# Patient Record
Sex: MALE | Race: WHITE | NOT HISPANIC OR LATINO | Employment: UNEMPLOYED | URBAN - METROPOLITAN AREA
[De-identification: names, ages, dates, MRNs, and addresses within clinical notes are randomized per-mention and may not be internally consistent; named-entity substitution may affect disease eponyms.]

---

## 2017-01-05 ENCOUNTER — OFFICE VISIT - HEALTHEAST (OUTPATIENT)
Dept: FAMILY MEDICINE | Facility: CLINIC | Age: 37
End: 2017-01-05

## 2017-01-05 DIAGNOSIS — S39.012A LOW BACK STRAIN, INITIAL ENCOUNTER: ICD-10-CM

## 2017-01-05 DIAGNOSIS — S80.01XA CONTUSION OF RIGHT KNEE, INITIAL ENCOUNTER: ICD-10-CM

## 2020-05-17 ENCOUNTER — APPOINTMENT (OUTPATIENT)
Dept: ULTRASOUND IMAGING | Facility: CLINIC | Age: 40
End: 2020-05-17
Attending: INTERNAL MEDICINE
Payer: MEDICAID

## 2020-05-17 ENCOUNTER — ALLIED HEALTH/NURSE VISIT (OUTPATIENT)
Dept: NEUROLOGY | Facility: CLINIC | Age: 40
End: 2020-05-17
Attending: PSYCHIATRY & NEUROLOGY
Payer: MEDICAID

## 2020-05-17 ENCOUNTER — APPOINTMENT (OUTPATIENT)
Dept: CT IMAGING | Facility: CLINIC | Age: 40
End: 2020-05-17
Attending: INTERNAL MEDICINE
Payer: MEDICAID

## 2020-05-17 ENCOUNTER — HOSPITAL ENCOUNTER (INPATIENT)
Facility: CLINIC | Age: 40
LOS: 39 days | Discharge: NURSING, REHAB OR LONG-TERM CARE | End: 2020-06-25
Admitting: INTERNAL MEDICINE
Payer: MEDICAID

## 2020-05-17 ENCOUNTER — HOSPITAL ENCOUNTER (EMERGENCY)
Facility: CLINIC | Age: 40
Discharge: HOME | End: 2020-05-17
Attending: EMERGENCY MEDICINE | Admitting: EMERGENCY MEDICINE
Payer: OTHER GOVERNMENT

## 2020-05-17 ENCOUNTER — APPOINTMENT (OUTPATIENT)
Dept: GENERAL RADIOLOGY | Facility: CLINIC | Age: 40
End: 2020-05-17
Attending: INTERNAL MEDICINE
Payer: MEDICAID

## 2020-05-17 DIAGNOSIS — R57.0 CARDIOGENIC SHOCK (H): ICD-10-CM

## 2020-05-17 DIAGNOSIS — R29.3 POSTURING EPISODE: ICD-10-CM

## 2020-05-17 DIAGNOSIS — Z98.890 S/P TRACHEOPLASTY: ICD-10-CM

## 2020-05-17 DIAGNOSIS — G93.1 HYPOXIC BRAIN INJURY (H): ICD-10-CM

## 2020-05-17 DIAGNOSIS — I46.9 CARDIAC ARREST (H): Primary | ICD-10-CM

## 2020-05-17 DIAGNOSIS — R11.0 NAUSEA: ICD-10-CM

## 2020-05-17 DIAGNOSIS — I25.83 CORONARY ARTERY DISEASE DUE TO LIPID RICH PLAQUE: ICD-10-CM

## 2020-05-17 DIAGNOSIS — I46.9 CARDIAC ARREST (H): ICD-10-CM

## 2020-05-17 DIAGNOSIS — Z91.89 AT HIGH RISK FOR MALNUTRITION: ICD-10-CM

## 2020-05-17 DIAGNOSIS — I50.21 ACUTE SYSTOLIC HEART FAILURE (H): ICD-10-CM

## 2020-05-17 DIAGNOSIS — J96.21 ACUTE AND CHRONIC RESPIRATORY FAILURE WITH HYPOXIA (H): ICD-10-CM

## 2020-05-17 DIAGNOSIS — L30.9 DERMATITIS: ICD-10-CM

## 2020-05-17 DIAGNOSIS — R19.7 DIARRHEA DUE TO MALABSORPTION: ICD-10-CM

## 2020-05-17 DIAGNOSIS — K29.71 GASTROINTESTINAL HEMORRHAGE ASSOCIATED WITH GASTRITIS, UNSPECIFIED GASTRITIS TYPE: ICD-10-CM

## 2020-05-17 DIAGNOSIS — I25.10 CORONARY ARTERY DISEASE DUE TO LIPID RICH PLAQUE: ICD-10-CM

## 2020-05-17 DIAGNOSIS — I21.3 ST ELEVATION MI (STEMI) (H): ICD-10-CM

## 2020-05-17 DIAGNOSIS — K90.9 DIARRHEA DUE TO MALABSORPTION: ICD-10-CM

## 2020-05-17 LAB
ABO + RH BLD: NORMAL
ALBUMIN SERPL-MCNC: 3.2 G/DL (ref 3.4–5)
ALBUMIN SERPL-MCNC: 3.3 G/DL (ref 3.4–5)
ALBUMIN SERPL-MCNC: 3.8 G/DL (ref 3.4–5)
ALBUMIN UR-MCNC: 10 MG/DL
ALP SERPL-CCNC: 60 U/L (ref 40–150)
ALP SERPL-CCNC: 84 U/L (ref 40–150)
ALP SERPL-CCNC: 86 U/L (ref 40–150)
ALT SERPL W P-5'-P-CCNC: 919 U/L (ref 0–70)
ALT SERPL W P-5'-P-CCNC: 972 U/L (ref 0–70)
ALT SERPL W P-5'-P-CCNC: 978 U/L (ref 0–70)
AMPHETAMINES UR QL SCN: NEGATIVE
ANION GAP SERPL CALCULATED.3IONS-SCNC: 13 MMOL/L (ref 3–14)
ANION GAP SERPL CALCULATED.3IONS-SCNC: 18 MMOL/L (ref 3–14)
ANION GAP SERPL CALCULATED.3IONS-SCNC: 18 MMOL/L (ref 3–14)
APPEARANCE UR: CLEAR
APTT PPP: 170 SEC (ref 22–37)
APTT PPP: 51 SEC (ref 22–37)
AST SERPL W P-5'-P-CCNC: 1138 U/L (ref 0–45)
AST SERPL W P-5'-P-CCNC: 1834 U/L (ref 0–45)
AST SERPL W P-5'-P-CCNC: 567 U/L (ref 0–45)
B-HCG FREE SERPL-ACNC: 2.4 [IU]/L (ref 0.86–1.14)
BACTERIA #/AREA URNS HPF: ABNORMAL /HPF
BARBITURATES UR QL: NEGATIVE
BASE DEFICIT BLDA-SCNC: 12.6 MMOL/L
BASE DEFICIT BLDA-SCNC: 6 MMOL/L
BASE DEFICIT BLDA-SCNC: 6.9 MMOL/L
BASE DEFICIT BLDA-SCNC: 7.3 MMOL/L
BASE DEFICIT BLDA-SCNC: 7.3 MMOL/L
BASE DEFICIT BLDA-SCNC: 8.2 MMOL/L
BASE DEFICIT BLDA-SCNC: 9.1 MMOL/L
BASE DEFICIT BLDA-SCNC: 9.1 MMOL/L
BASE DEFICIT BLDV-SCNC: 7.2 MMOL/L
BASE DEFICIT BLDV-SCNC: 7.3 MMOL/L
BASOPHILS # BLD AUTO: 0 10E9/L (ref 0–0.2)
BASOPHILS # BLD AUTO: 0.1 10E9/L (ref 0–0.2)
BASOPHILS NFR BLD AUTO: 0 %
BASOPHILS NFR BLD AUTO: 0.3 %
BENZODIAZ UR QL: POSITIVE
BILIRUB SERPL-MCNC: 0.1 MG/DL (ref 0.2–1.3)
BILIRUB SERPL-MCNC: 0.5 MG/DL (ref 0.2–1.3)
BILIRUB SERPL-MCNC: 0.7 MG/DL (ref 0.2–1.3)
BILIRUB UR QL STRIP: NEGATIVE
BLD GP AB SCN SERPL QL: NORMAL
BLD GP AB SCN SERPL QL: NORMAL
BLD PROD TYP BPU: NORMAL
BLOOD BANK CMNT PATIENT-IMP: NORMAL
BLOOD BANK CMNT PATIENT-IMP: NORMAL
BUN SERPL-MCNC: 15 MG/DL (ref 7–30)
BUN SERPL-MCNC: 18 MG/DL (ref 7–30)
BUN SERPL-MCNC: 22 MG/DL (ref 7–30)
CA-I BLD-MCNC: 3.6 MG/DL (ref 4.4–5.2)
CA-I BLD-MCNC: 3.7 MG/DL (ref 4.4–5.2)
CA-I BLD-MCNC: 4.4 MG/DL (ref 4.4–5.2)
CA-I BLD-SCNC: 4.7 MG/DL (ref 4.4–5.2)
CALCIUM SERPL-MCNC: 6.6 MG/DL (ref 8.5–10.1)
CALCIUM SERPL-MCNC: 7.4 MG/DL (ref 8.5–10.1)
CALCIUM SERPL-MCNC: 8.2 MG/DL (ref 8.5–10.1)
CANNABINOIDS UR QL SCN: NEGATIVE
CHLORIDE SERPL-SCNC: 101 MMOL/L (ref 94–109)
CHLORIDE SERPL-SCNC: 107 MMOL/L (ref 94–109)
CHLORIDE SERPL-SCNC: 115 MMOL/L (ref 94–109)
CO2 BLDCOV-SCNC: 16 MMOL/L (ref 21–28)
CO2 SERPL-SCNC: 15 MMOL/L (ref 20–32)
CO2 SERPL-SCNC: 16 MMOL/L (ref 20–32)
CO2 SERPL-SCNC: 17 MMOL/L (ref 20–32)
COCAINE UR QL: NEGATIVE
COLOR UR AUTO: ABNORMAL
CORTIS SERPL-MCNC: 47.6 UG/DL (ref 4–22)
CREAT SERPL-MCNC: 1.21 MG/DL (ref 0.66–1.25)
CREAT SERPL-MCNC: 1.24 MG/DL (ref 0.66–1.25)
CREAT SERPL-MCNC: 1.39 MG/DL (ref 0.66–1.25)
CRP SERPL-MCNC: <2.9 MG/L (ref 0–8)
D DIMER PPP FEU-MCNC: >20 UG/ML FEU (ref 0–0.5)
DIFFERENTIAL METHOD BLD: ABNORMAL
DIFFERENTIAL METHOD BLD: ABNORMAL
EOSINOPHIL # BLD AUTO: 0.1 10E9/L (ref 0–0.7)
EOSINOPHIL # BLD AUTO: 0.1 10E9/L (ref 0–0.7)
EOSINOPHIL NFR BLD AUTO: 0.4 %
EOSINOPHIL NFR BLD AUTO: 0.8 %
ERYTHROCYTE [DISTWIDTH] IN BLOOD BY AUTOMATED COUNT: 12 % (ref 10–15)
ERYTHROCYTE [DISTWIDTH] IN BLOOD BY AUTOMATED COUNT: 12.5 % (ref 10–15)
ERYTHROCYTE [DISTWIDTH] IN BLOOD BY AUTOMATED COUNT: 12.5 % (ref 10–15)
ERYTHROCYTE [SEDIMENTATION RATE] IN BLOOD BY WESTERGREN METHOD: 7 MM/H (ref 0–15)
ETHANOL UR QL SCN: NEGATIVE
FIBRINOGEN PPP-MCNC: 212 MG/DL (ref 200–420)
GFR SERPL CREATININE-BSD FRML MDRD: 41 ML/MIN/{1.73_M2}
GFR SERPL CREATININE-BSD FRML MDRD: 63 ML/MIN/{1.73_M2}
GFR SERPL CREATININE-BSD FRML MDRD: 74 ML/MIN/{1.73_M2}
GLUCOSE BLD-MCNC: 164 MG/DL (ref 70–99)
GLUCOSE BLD-MCNC: 276 MG/DL (ref 70–99)
GLUCOSE BLD-MCNC: 366 MG/DL (ref 70–99)
GLUCOSE BLD-MCNC: 422 MG/DL (ref 70–99)
GLUCOSE BLDC GLUCOMTR-MCNC: 145 MG/DL (ref 70–99)
GLUCOSE BLDC GLUCOMTR-MCNC: 166 MG/DL (ref 70–99)
GLUCOSE BLDC GLUCOMTR-MCNC: 188 MG/DL (ref 70–99)
GLUCOSE BLDC GLUCOMTR-MCNC: 248 MG/DL (ref 70–99)
GLUCOSE BLDC GLUCOMTR-MCNC: 287 MG/DL (ref 70–99)
GLUCOSE SERPL-MCNC: 155 MG/DL (ref 70–99)
GLUCOSE SERPL-MCNC: 260 MG/DL (ref 70–99)
GLUCOSE SERPL-MCNC: 394 MG/DL (ref 70–99)
GLUCOSE UR STRIP-MCNC: 30 MG/DL
GRAM STN SPEC: NORMAL
GRAM STN SPEC: NORMAL
HCO3 BLD-SCNC: 14 MMOL/L (ref 21–28)
HCO3 BLD-SCNC: 16 MMOL/L (ref 21–28)
HCO3 BLD-SCNC: 16 MMOL/L (ref 21–28)
HCO3 BLD-SCNC: 17 MMOL/L (ref 21–28)
HCO3 BLD-SCNC: 17 MMOL/L (ref 21–28)
HCO3 BLD-SCNC: 18 MMOL/L (ref 21–28)
HCO3 BLDA-SCNC: 14 MMOL/L (ref 21–28)
HCO3 BLDA-SCNC: 17 MMOL/L (ref 21–28)
HCO3 BLDV-SCNC: 18 MMOL/L (ref 21–28)
HCO3 BLDV-SCNC: 18 MMOL/L (ref 21–28)
HCT VFR BLD AUTO: 35.2 % (ref 40–53)
HCT VFR BLD AUTO: 35.6 % (ref 40–53)
HCT VFR BLD AUTO: 50.6 % (ref 40–53)
HCT VFR BLD CALC: 48 %PCV (ref 40–53)
HGB BLD CALC-MCNC: 16.3 G/DL (ref 13.3–17.7)
HGB BLD-MCNC: 11.6 G/DL (ref 13.3–17.7)
HGB BLD-MCNC: 12 G/DL (ref 13.3–17.7)
HGB BLD-MCNC: 12.3 G/DL (ref 13.3–17.7)
HGB BLD-MCNC: 15.6 G/DL (ref 13.3–17.7)
HGB FREE PLAS-MCNC: 70 MG/DL
HGB UR QL STRIP: ABNORMAL
IMM GRANULOCYTES # BLD: 1.1 10E9/L (ref 0–0.4)
IMM GRANULOCYTES NFR BLD: 4.6 %
INR PPP: 1.25 (ref 0.86–1.14)
INR PPP: 1.43 (ref 0.86–1.14)
INR PPP: 1.59 (ref 0.86–1.14)
INTERPRETATION ECG - MUSE: NORMAL
KCT BLD-ACNC: 154 SEC (ref 75–150)
KCT BLD-ACNC: 304 SEC (ref 75–150)
KCT BLD-ACNC: 320 SEC (ref 75–150)
KETONES UR STRIP-MCNC: 10 MG/DL
LACTATE BLD-SCNC: 10 MMOL/L (ref 0.7–2)
LACTATE BLD-SCNC: 11.3 MMOL/L (ref 0.7–2)
LACTATE BLD-SCNC: 16 MMOL/L (ref 0.7–2)
LACTATE BLD-SCNC: 7.3 MMOL/L (ref 0.7–2)
LACTATE BLD-SCNC: 8.9 MMOL/L (ref 0.7–2)
LDH SERPL L TO P-CCNC: 1564 U/L (ref 85–227)
LEUKOCYTE ESTERASE UR QL STRIP: NEGATIVE
LMWH PPP CHRO-ACNC: 0.42 IU/ML
LMWH PPP CHRO-ACNC: <0.1 IU/ML
LYMPHOCYTES # BLD AUTO: 1.3 10E9/L (ref 0.8–5.3)
LYMPHOCYTES # BLD AUTO: 4.8 10E9/L (ref 0.8–5.3)
LYMPHOCYTES NFR BLD AUTO: 48 %
LYMPHOCYTES NFR BLD AUTO: 5.2 %
Lab: NORMAL
MAGNESIUM SERPL-MCNC: 1.9 MG/DL (ref 1.6–2.3)
MAGNESIUM SERPL-MCNC: 2 MG/DL (ref 1.6–2.3)
MCH RBC QN AUTO: 30.1 PG (ref 26.5–33)
MCH RBC QN AUTO: 30.4 PG (ref 26.5–33)
MCH RBC QN AUTO: 30.4 PG (ref 26.5–33)
MCHC RBC AUTO-ENTMCNC: 30.8 G/DL (ref 31.5–36.5)
MCHC RBC AUTO-ENTMCNC: 34.1 G/DL (ref 31.5–36.5)
MCHC RBC AUTO-ENTMCNC: 34.6 G/DL (ref 31.5–36.5)
MCV RBC AUTO: 87 FL (ref 78–100)
MCV RBC AUTO: 89 FL (ref 78–100)
MCV RBC AUTO: 99 FL (ref 78–100)
METAMYELOCYTES # BLD: 0.4 10E9/L
METAMYELOCYTES NFR BLD MANUAL: 4.2 %
MISCELLANEOUS TEST: NORMAL
MONOCYTES # BLD AUTO: 0.5 10E9/L (ref 0–1.3)
MONOCYTES # BLD AUTO: 0.6 10E9/L (ref 0–1.3)
MONOCYTES NFR BLD AUTO: 2.2 %
MONOCYTES NFR BLD AUTO: 5.9 %
MUCOUS THREADS #/AREA URNS LPF: PRESENT /LPF
MYELOCYTES # BLD: 0.1 10E9/L
MYELOCYTES NFR BLD MANUAL: 0.8 %
NEUTROPHILS # BLD AUTO: 20.9 10E9/L (ref 1.6–8.3)
NEUTROPHILS # BLD AUTO: 4 10E9/L (ref 1.6–8.3)
NEUTROPHILS NFR BLD AUTO: 39.5 %
NEUTROPHILS NFR BLD AUTO: 87.3 %
NITRATE UR QL: NEGATIVE
NRBC # BLD AUTO: 0 10*3/UL
NRBC # BLD AUTO: 0.1 10*3/UL
NRBC BLD AUTO-RTO: 0 /100
NRBC BLD AUTO-RTO: 1 /100
NUM BPU REQUESTED: 1
O2/TOTAL GAS SETTING VFR VENT: 100 %
O2/TOTAL GAS SETTING VFR VENT: 50 %
OPIATES UR QL SCN: NEGATIVE
OXYHGB MFR BLD: 88 % (ref 92–100)
OXYHGB MFR BLD: 89 % (ref 92–100)
OXYHGB MFR BLD: 93 % (ref 92–100)
OXYHGB MFR BLD: 95 % (ref 92–100)
OXYHGB MFR BLD: 96 % (ref 92–100)
OXYHGB MFR BLD: 98 % (ref 92–100)
OXYHGB MFR BLDA: 96 % (ref 75–100)
OXYHGB MFR BLDA: 98 % (ref 75–100)
OXYHGB MFR BLDV: 74 %
OXYHGB MFR BLDV: 84 %
PCO2 BLD: 27 MM HG (ref 35–45)
PCO2 BLD: 28 MM HG (ref 35–45)
PCO2 BLD: 31 MM HG (ref 35–45)
PCO2 BLD: 31 MM HG (ref 35–45)
PCO2 BLD: 34 MM HG (ref 35–45)
PCO2 BLD: 36 MM HG (ref 35–45)
PCO2 BLDA: 24 MM HG (ref 35–45)
PCO2 BLDA: 29 MM HG (ref 35–45)
PCO2 BLDV: 100 MM HG (ref 40–50)
PCO2 BLDV: 33 MM HG (ref 40–50)
PCO2 BLDV: 35 MM HG (ref 40–50)
PH BLD: 7.21 PH (ref 7.35–7.45)
PH BLD: 7.3 PH (ref 7.35–7.45)
PH BLD: 7.35 PH (ref 7.35–7.45)
PH BLD: 7.37 PH (ref 7.35–7.45)
PH BLD: 7.38 PH (ref 7.35–7.45)
PH BLD: 7.4 PH (ref 7.35–7.45)
PH BLDA: 7.36 PH (ref 7.35–7.45)
PH BLDA: 7.39 PH (ref 7.35–7.45)
PH BLDV: 6.83 PH (ref 7.32–7.43)
PH BLDV: 7.32 PH (ref 7.32–7.43)
PH BLDV: 7.34 PH (ref 7.32–7.43)
PH UR STRIP: 5.5 PH (ref 5–7)
PLATELET # BLD AUTO: 188 10E9/L (ref 150–450)
PLATELET # BLD AUTO: 236 10E9/L (ref 150–450)
PLATELET # BLD AUTO: 93 10E9/L (ref 150–450)
PLATELET # BLD EST: ABNORMAL 10*3/UL
PO2 BLD: 108 MM HG (ref 80–105)
PO2 BLD: 242 MM HG (ref 80–105)
PO2 BLD: 68 MM HG (ref 80–105)
PO2 BLD: 74 MM HG (ref 80–105)
PO2 BLD: 93 MM HG (ref 80–105)
PO2 BLD: 97 MM HG (ref 80–105)
PO2 BLDA: 225 MM HG (ref 80–105)
PO2 BLDA: 484 MM HG (ref 80–105)
PO2 BLDV: 14 MM HG (ref 25–47)
PO2 BLDV: 46 MM HG (ref 25–47)
PO2 BLDV: 56 MM HG (ref 25–47)
POTASSIUM BLD-SCNC: 3.5 MMOL/L (ref 3.4–5.3)
POTASSIUM BLD-SCNC: 4.5 MMOL/L (ref 3.4–5.3)
POTASSIUM SERPL-SCNC: 3.3 MMOL/L (ref 3.4–5.3)
POTASSIUM SERPL-SCNC: 3.3 MMOL/L (ref 3.4–5.3)
POTASSIUM SERPL-SCNC: 4.2 MMOL/L (ref 3.4–5.3)
PROCALCITONIN SERPL-MCNC: 0.4 NG/ML
PROMYELOCYTES # BLD MANUAL: 0.1 10E9/L
PROMYELOCYTES NFR BLD MANUAL: 0.8 %
PROT SERPL-MCNC: 5.7 G/DL (ref 6.8–8.8)
PROT SERPL-MCNC: 6.5 G/DL (ref 6.8–8.8)
PROT SERPL-MCNC: 6.9 G/DL (ref 6.8–8.8)
RBC # BLD AUTO: 3.95 10E12/L (ref 4.4–5.9)
RBC # BLD AUTO: 4.09 10E12/L (ref 4.4–5.9)
RBC # BLD AUTO: 5.13 10E12/L (ref 4.4–5.9)
RBC #/AREA URNS AUTO: 27 /HPF (ref 0–2)
RBC MORPH BLD: NORMAL
SAO2 % BLDV FROM PO2: 6 %
SARS-COV-2 PCR COMMENT: NORMAL
SARS-COV-2 PCR COMMENT: NORMAL
SARS-COV-2 RNA SPEC QL NAA+PROBE: NEGATIVE
SARS-COV-2 RNA SPEC QL NAA+PROBE: NEGATIVE
SARS-COV-2 RNA SPEC QL NAA+PROBE: NORMAL
SARS-COV-2 RNA SPEC QL NAA+PROBE: NORMAL
SODIUM BLD-SCNC: 136 MMOL/L (ref 133–144)
SODIUM BLD-SCNC: 140 MMOL/L (ref 133–144)
SODIUM SERPL-SCNC: 136 MMOL/L (ref 133–144)
SODIUM SERPL-SCNC: 140 MMOL/L (ref 133–144)
SODIUM SERPL-SCNC: 145 MMOL/L (ref 133–144)
SOURCE: ABNORMAL
SP GR UR STRIP: 1.02 (ref 1–1.03)
SPECIMEN EXP DATE BLD: NORMAL
SPECIMEN EXP DATE BLD: NORMAL
SPECIMEN SOURCE: NORMAL
SQUAMOUS #/AREA URNS AUTO: <1 /HPF (ref 0–1)
TROPONIN I SERPL-MCNC: 0.28 UG/L (ref 0–0.04)
TROPONIN I SERPL-MCNC: 146.79 UG/L (ref 0–0.04)
TROPONIN I SERPL-MCNC: >200 UG/L (ref 0–0.04)
UROBILINOGEN UR STRIP-MCNC: NORMAL MG/DL (ref 0–2)
WBC # BLD AUTO: 10.1 10E9/L (ref 4–11)
WBC # BLD AUTO: 17.2 10E9/L (ref 4–11)
WBC # BLD AUTO: 23.9 10E9/L (ref 4–11)
WBC #/AREA URNS AUTO: 7 /HPF (ref 0–5)

## 2020-05-17 PROCEDURE — 85396 CLOTTING ASSAY WHOLE BLOOD: CPT | Performed by: INTERNAL MEDICINE

## 2020-05-17 PROCEDURE — 25000128 H RX IP 250 OP 636: Performed by: INTERNAL MEDICINE

## 2020-05-17 PROCEDURE — 40000986 XR CHEST PORT 1 VW

## 2020-05-17 PROCEDURE — 5A1522G EXTRACORPOREAL OXYGENATION, MEMBRANE, PERIPHERAL VENO-ARTERIAL: ICD-10-PCS | Performed by: INTERNAL MEDICINE

## 2020-05-17 PROCEDURE — 80347 BENZODIAZEPINES 13 OR MORE: CPT | Performed by: INTERNAL MEDICINE

## 2020-05-17 PROCEDURE — 92978 ENDOLUMINL IVUS OCT C 1ST: CPT | Performed by: INTERNAL MEDICINE

## 2020-05-17 PROCEDURE — 92920 PRQ TRLUML C ANGIOP 1ART&/BR: CPT | Performed by: INTERNAL MEDICINE

## 2020-05-17 PROCEDURE — B240ZZ3 ULTRASONOGRAPHY OF SINGLE CORONARY ARTERY, INTRAVASCULAR: ICD-10-PCS | Performed by: INTERNAL MEDICINE

## 2020-05-17 PROCEDURE — 93925 LOWER EXTREMITY STUDY: CPT

## 2020-05-17 PROCEDURE — 85730 THROMBOPLASTIN TIME PARTIAL: CPT | Performed by: INTERNAL MEDICINE

## 2020-05-17 PROCEDURE — C9113 INJ PANTOPRAZOLE SODIUM, VIA: HCPCS | Performed by: INTERNAL MEDICINE

## 2020-05-17 PROCEDURE — 37799 UNLISTED PX VASCULAR SURGERY: CPT | Performed by: INTERNAL MEDICINE

## 2020-05-17 PROCEDURE — 93880 EXTRACRANIAL BILAT STUDY: CPT

## 2020-05-17 PROCEDURE — 20000004 ZZH R&B ICU UMMC

## 2020-05-17 PROCEDURE — C9460 INJECTION, CANGRELOR: HCPCS | Performed by: INTERNAL MEDICINE

## 2020-05-17 PROCEDURE — 80053 COMPREHEN METABOLIC PANEL: CPT | Performed by: EMERGENCY MEDICINE

## 2020-05-17 PROCEDURE — 85610 PROTHROMBIN TIME: CPT | Performed by: EMERGENCY MEDICINE

## 2020-05-17 PROCEDURE — 82533 TOTAL CORTISOL: CPT | Performed by: INTERNAL MEDICINE

## 2020-05-17 PROCEDURE — 40000076 ZZH STATISTIC IABP MONITORING

## 2020-05-17 PROCEDURE — 40000081 ZZH STATISTIC INSERT IABP

## 2020-05-17 PROCEDURE — 27211184 ZZH CARDIOHELP CIRCUIT

## 2020-05-17 PROCEDURE — C1757 CATH, THROMBECTOMY/EMBOLECT: HCPCS | Performed by: INTERNAL MEDICINE

## 2020-05-17 PROCEDURE — 86900 BLOOD TYPING SEROLOGIC ABO: CPT | Performed by: INTERNAL MEDICINE

## 2020-05-17 PROCEDURE — 5A1935Z RESPIRATORY VENTILATION, LESS THAN 24 CONSECUTIVE HOURS: ICD-10-PCS | Performed by: INTERNAL MEDICINE

## 2020-05-17 PROCEDURE — 99291 CRITICAL CARE FIRST HOUR: CPT | Mod: 25 | Performed by: EMERGENCY MEDICINE

## 2020-05-17 PROCEDURE — 92950 HEART/LUNG RESUSCITATION CPR: CPT | Performed by: EMERGENCY MEDICINE

## 2020-05-17 PROCEDURE — 25000125 ZZHC RX 250: Performed by: INTERNAL MEDICINE

## 2020-05-17 PROCEDURE — 40000502 ZZHCL STATISTIC GLUCOSE ED POCT

## 2020-05-17 PROCEDURE — 25800030 ZZH RX IP 258 OP 636: Performed by: INTERNAL MEDICINE

## 2020-05-17 PROCEDURE — 86316 IMMUNOASSAY TUMOR OTHER: CPT | Performed by: INTERNAL MEDICINE

## 2020-05-17 PROCEDURE — 25800030 ZZH RX IP 258 OP 636

## 2020-05-17 PROCEDURE — 95700 EEG CONT REC W/VID EEG TECH: CPT | Mod: ZF

## 2020-05-17 PROCEDURE — 86901 BLOOD TYPING SEROLOGIC RH(D): CPT | Performed by: EMERGENCY MEDICINE

## 2020-05-17 PROCEDURE — 31500 INSERT EMERGENCY AIRWAY: CPT | Mod: Z6 | Performed by: EMERGENCY MEDICINE

## 2020-05-17 PROCEDURE — 82803 BLOOD GASES ANY COMBINATION: CPT

## 2020-05-17 PROCEDURE — 85347 COAGULATION TIME ACTIVATED: CPT

## 2020-05-17 PROCEDURE — 83605 ASSAY OF LACTIC ACID: CPT

## 2020-05-17 PROCEDURE — 87205 SMEAR GRAM STAIN: CPT | Performed by: INTERNAL MEDICINE

## 2020-05-17 PROCEDURE — 86140 C-REACTIVE PROTEIN: CPT | Performed by: INTERNAL MEDICINE

## 2020-05-17 PROCEDURE — 80307 DRUG TEST PRSMV CHEM ANLYZR: CPT | Performed by: INTERNAL MEDICINE

## 2020-05-17 PROCEDURE — 86923 COMPATIBILITY TEST ELECTRIC: CPT | Performed by: INTERNAL MEDICINE

## 2020-05-17 PROCEDURE — 40000275 ZZH STATISTIC RCP TIME EA 10 MIN

## 2020-05-17 PROCEDURE — 85610 PROTHROMBIN TIME: CPT | Performed by: INTERNAL MEDICINE

## 2020-05-17 PROCEDURE — 027034Z DILATION OF CORONARY ARTERY, ONE ARTERY WITH DRUG-ELUTING INTRALUMINAL DEVICE, PERCUTANEOUS APPROACH: ICD-10-PCS | Performed by: INTERNAL MEDICINE

## 2020-05-17 PROCEDURE — 87070 CULTURE OTHR SPECIMN AEROBIC: CPT | Performed by: INTERNAL MEDICINE

## 2020-05-17 PROCEDURE — 31500 INSERT EMERGENCY AIRWAY: CPT | Performed by: EMERGENCY MEDICINE

## 2020-05-17 PROCEDURE — 82805 BLOOD GASES W/O2 SATURATION: CPT | Performed by: INTERNAL MEDICINE

## 2020-05-17 PROCEDURE — 5A02210 ASSISTANCE WITH CARDIAC OUTPUT USING BALLOON PUMP, CONTINUOUS: ICD-10-PCS | Performed by: INTERNAL MEDICINE

## 2020-05-17 PROCEDURE — 25000132 ZZH RX MED GY IP 250 OP 250 PS 637: Performed by: STUDENT IN AN ORGANIZED HEALTH CARE EDUCATION/TRAINING PROGRAM

## 2020-05-17 PROCEDURE — 71250 CT THORAX DX C-: CPT

## 2020-05-17 PROCEDURE — 80053 COMPREHEN METABOLIC PANEL: CPT | Performed by: INTERNAL MEDICINE

## 2020-05-17 PROCEDURE — 27211402 ZZH SENSOR NIRS OXIMETER, ADULT

## 2020-05-17 PROCEDURE — 92921 ZZHC PRQ TRLUML CORONARY ANGIOPLASTY ADDL BRANCH: CPT | Mod: LD | Performed by: INTERNAL MEDICINE

## 2020-05-17 PROCEDURE — 25000128 H RX IP 250 OP 636

## 2020-05-17 PROCEDURE — 40000281 ZZH STATISTIC TRANSPORT TIME EA 15 MIN

## 2020-05-17 PROCEDURE — 93454 CORONARY ARTERY ANGIO S&I: CPT | Performed by: INTERNAL MEDICINE

## 2020-05-17 PROCEDURE — 86900 BLOOD TYPING SEROLOGIC ABO: CPT | Performed by: EMERGENCY MEDICINE

## 2020-05-17 PROCEDURE — 82330 ASSAY OF CALCIUM: CPT

## 2020-05-17 PROCEDURE — 82947 ASSAY GLUCOSE BLOOD QUANT: CPT

## 2020-05-17 PROCEDURE — 86850 RBC ANTIBODY SCREEN: CPT | Performed by: EMERGENCY MEDICINE

## 2020-05-17 PROCEDURE — 84132 ASSAY OF SERUM POTASSIUM: CPT

## 2020-05-17 PROCEDURE — P9041 ALBUMIN (HUMAN),5%, 50ML: HCPCS | Performed by: INTERNAL MEDICINE

## 2020-05-17 PROCEDURE — 27211119 ZZH ARTERIAL CANNULA 15-17 FRENCH: Performed by: INTERNAL MEDICINE

## 2020-05-17 PROCEDURE — 82947 ASSAY GLUCOSE BLOOD QUANT: CPT | Performed by: INTERNAL MEDICINE

## 2020-05-17 PROCEDURE — P9016 RBC LEUKOCYTES REDUCED: HCPCS | Performed by: INTERNAL MEDICINE

## 2020-05-17 PROCEDURE — 85520 HEPARIN ASSAY: CPT | Performed by: INTERNAL MEDICINE

## 2020-05-17 PROCEDURE — 85610 PROTHROMBIN TIME: CPT

## 2020-05-17 PROCEDURE — 93005 ELECTROCARDIOGRAM TRACING: CPT | Performed by: EMERGENCY MEDICINE

## 2020-05-17 PROCEDURE — C1874 STENT, COATED/COV W/DEL SYS: HCPCS | Performed by: INTERNAL MEDICINE

## 2020-05-17 PROCEDURE — 27210305 ZZH CATH BALLOON IABP

## 2020-05-17 PROCEDURE — 82810 BLOOD GASES O2 SAT ONLY: CPT

## 2020-05-17 PROCEDURE — 36556 INSERT NON-TUNNEL CV CATH: CPT | Performed by: INTERNAL MEDICINE

## 2020-05-17 PROCEDURE — 87040 BLOOD CULTURE FOR BACTERIA: CPT | Performed by: INTERNAL MEDICINE

## 2020-05-17 PROCEDURE — C1753 CATH, INTRAVAS ULTRASOUND: HCPCS | Performed by: INTERNAL MEDICINE

## 2020-05-17 PROCEDURE — 27210794 ZZH OR GENERAL SUPPLY STERILE: Performed by: INTERNAL MEDICINE

## 2020-05-17 PROCEDURE — 94002 VENT MGMT INPAT INIT DAY: CPT

## 2020-05-17 PROCEDURE — C9606 PERC D-E COR REVASC W AMI S: HCPCS | Performed by: INTERNAL MEDICINE

## 2020-05-17 PROCEDURE — 86901 BLOOD TYPING SEROLOGIC RH(D): CPT | Performed by: INTERNAL MEDICINE

## 2020-05-17 PROCEDURE — 40000501 ZZHCL STATISTIC HEMATOCRIT ED POCT

## 2020-05-17 PROCEDURE — B2111ZZ FLUOROSCOPY OF MULTIPLE CORONARY ARTERIES USING LOW OSMOLAR CONTRAST: ICD-10-PCS | Performed by: INTERNAL MEDICINE

## 2020-05-17 PROCEDURE — 40000498 ZZHCL STATISTIC POTASSIUM ED POCT

## 2020-05-17 PROCEDURE — 85027 COMPLETE CBC AUTOMATED: CPT | Performed by: INTERNAL MEDICINE

## 2020-05-17 PROCEDURE — 83605 ASSAY OF LACTIC ACID: CPT | Performed by: INTERNAL MEDICINE

## 2020-05-17 PROCEDURE — 84145 PROCALCITONIN (PCT): CPT | Performed by: INTERNAL MEDICINE

## 2020-05-17 PROCEDURE — 93010 ELECTROCARDIOGRAM REPORT: CPT | Mod: 59 | Performed by: INTERNAL MEDICINE

## 2020-05-17 PROCEDURE — 36620 INSERTION CATHETER ARTERY: CPT | Performed by: INTERNAL MEDICINE

## 2020-05-17 PROCEDURE — 3E043XZ INTRODUCTION OF VASOPRESSOR INTO CENTRAL VEIN, PERCUTANEOUS APPROACH: ICD-10-PCS | Performed by: INTERNAL MEDICINE

## 2020-05-17 PROCEDURE — 40000497 ZZHCL STATISTIC SODIUM ED POCT

## 2020-05-17 PROCEDURE — 87635 SARS-COV-2 COVID-19 AMP PRB: CPT | Performed by: INTERNAL MEDICINE

## 2020-05-17 PROCEDURE — C1887 CATHETER, GUIDING: HCPCS | Performed by: INTERNAL MEDICINE

## 2020-05-17 PROCEDURE — 99153 MOD SED SAME PHYS/QHP EA: CPT | Performed by: INTERNAL MEDICINE

## 2020-05-17 PROCEDURE — 82330 ASSAY OF CALCIUM: CPT | Performed by: INTERNAL MEDICINE

## 2020-05-17 PROCEDURE — 85025 COMPLETE CBC W/AUTO DIFF WBC: CPT | Performed by: INTERNAL MEDICINE

## 2020-05-17 PROCEDURE — 00000146 ZZHCL STATISTIC GLUCOSE BY METER IP

## 2020-05-17 PROCEDURE — 93010 ELECTROCARDIOGRAM REPORT: CPT | Mod: Z6 | Performed by: EMERGENCY MEDICINE

## 2020-05-17 PROCEDURE — 85652 RBC SED RATE AUTOMATED: CPT | Performed by: INTERNAL MEDICINE

## 2020-05-17 PROCEDURE — 02703ZZ DILATION OF CORONARY ARTERY, ONE ARTERY, PERCUTANEOUS APPROACH: ICD-10-PCS | Performed by: INTERNAL MEDICINE

## 2020-05-17 PROCEDURE — 99152 MOD SED SAME PHYS/QHP 5/>YRS: CPT | Performed by: INTERNAL MEDICINE

## 2020-05-17 PROCEDURE — 33952 ECMO/ECLS INSJ PRPH CANNULA: CPT | Performed by: INTERNAL MEDICINE

## 2020-05-17 PROCEDURE — 93005 ELECTROCARDIOGRAM TRACING: CPT

## 2020-05-17 PROCEDURE — C1725 CATH, TRANSLUMIN NON-LASER: HCPCS | Performed by: INTERNAL MEDICINE

## 2020-05-17 PROCEDURE — 85384 FIBRINOGEN ACTIVITY: CPT | Performed by: INTERNAL MEDICINE

## 2020-05-17 PROCEDURE — 80320 DRUG SCREEN QUANTALCOHOLS: CPT | Performed by: INTERNAL MEDICINE

## 2020-05-17 PROCEDURE — 83735 ASSAY OF MAGNESIUM: CPT | Performed by: INTERNAL MEDICINE

## 2020-05-17 PROCEDURE — C1751 CATH, INF, PER/CENT/MIDLINE: HCPCS | Performed by: INTERNAL MEDICINE

## 2020-05-17 PROCEDURE — 99285 EMERGENCY DEPT VISIT HI MDM: CPT | Mod: 25 | Performed by: EMERGENCY MEDICINE

## 2020-05-17 PROCEDURE — 40000014 ZZH STATISTIC ARTERIAL MONITORING DAILY

## 2020-05-17 PROCEDURE — 84484 ASSAY OF TROPONIN QUANT: CPT | Performed by: INTERNAL MEDICINE

## 2020-05-17 PROCEDURE — 86850 RBC ANTIBODY SCREEN: CPT | Performed by: INTERNAL MEDICINE

## 2020-05-17 PROCEDURE — 33967 INSERT I-AORT PERCUT DEVICE: CPT

## 2020-05-17 PROCEDURE — C1769 GUIDE WIRE: HCPCS | Performed by: INTERNAL MEDICINE

## 2020-05-17 PROCEDURE — 85025 COMPLETE CBC W/AUTO DIFF WBC: CPT | Performed by: EMERGENCY MEDICINE

## 2020-05-17 PROCEDURE — 83051 HEMOGLOBIN PLASMA: CPT | Performed by: INTERNAL MEDICINE

## 2020-05-17 PROCEDURE — 85379 FIBRIN DEGRADATION QUANT: CPT | Performed by: INTERNAL MEDICINE

## 2020-05-17 PROCEDURE — 81001 URINALYSIS AUTO W/SCOPE: CPT | Performed by: INTERNAL MEDICINE

## 2020-05-17 PROCEDURE — 84484 ASSAY OF TROPONIN QUANT: CPT | Performed by: EMERGENCY MEDICINE

## 2020-05-17 PROCEDURE — 70450 CT HEAD/BRAIN W/O DYE: CPT

## 2020-05-17 PROCEDURE — 27211332 ZZ H CANNULA, VENOUS FEMORAL, ADULT

## 2020-05-17 PROCEDURE — 99291 CRITICAL CARE FIRST HOUR: CPT | Mod: 25 | Performed by: INTERNAL MEDICINE

## 2020-05-17 PROCEDURE — P9047 ALBUMIN (HUMAN), 25%, 50ML: HCPCS | Performed by: INTERNAL MEDICINE

## 2020-05-17 PROCEDURE — 33947 ECMO/ECLS INITIATION ARTERY: CPT

## 2020-05-17 PROCEDURE — 84295 ASSAY OF SERUM SODIUM: CPT

## 2020-05-17 PROCEDURE — 99292 CRITICAL CARE ADDL 30 MIN: CPT | Mod: 25 | Performed by: INTERNAL MEDICINE

## 2020-05-17 PROCEDURE — 83615 LACTATE (LD) (LDH) ENZYME: CPT | Performed by: INTERNAL MEDICINE

## 2020-05-17 PROCEDURE — 25000132 ZZH RX MED GY IP 250 OP 250 PS 637: Performed by: INTERNAL MEDICINE

## 2020-05-17 PROCEDURE — 27210299 ZZH CANNULA, ARTERIAL FEMORAL

## 2020-05-17 PROCEDURE — 02C03ZZ EXTIRPATION OF MATTER FROM CORONARY ARTERY, ONE ARTERY, PERCUTANEOUS APPROACH: ICD-10-PCS | Performed by: INTERNAL MEDICINE

## 2020-05-17 PROCEDURE — C1894 INTRO/SHEATH, NON-LASER: HCPCS | Performed by: INTERNAL MEDICINE

## 2020-05-17 DEVICE — STENT SYNERGY DRUG ELUTING 3.00X32MM  H7493926032300: Type: IMPLANTABLE DEVICE | Status: FUNCTIONAL

## 2020-05-17 RX ORDER — DEXTROSE MONOHYDRATE 100 MG/ML
INJECTION, SOLUTION INTRAVENOUS CONTINUOUS PRN
Status: DISCONTINUED | OUTPATIENT
Start: 2020-05-17 | End: 2020-05-20 | Stop reason: CLARIF

## 2020-05-17 RX ORDER — PIPERACILLIN SODIUM, TAZOBACTAM SODIUM 3; .375 G/15ML; G/15ML
3.38 INJECTION, POWDER, LYOPHILIZED, FOR SOLUTION INTRAVENOUS EVERY 6 HOURS
Status: DISCONTINUED | OUTPATIENT
Start: 2020-05-17 | End: 2020-05-22

## 2020-05-17 RX ORDER — PROPOFOL 10 MG/ML
5-75 INJECTION, EMULSION INTRAVENOUS CONTINUOUS
Status: DISCONTINUED | OUTPATIENT
Start: 2020-05-17 | End: 2020-05-22

## 2020-05-17 RX ORDER — HEPARIN SODIUM (PORCINE) LOCK FLUSH IV SOLN 100 UNIT/ML 100 UNIT/ML
5-10 SOLUTION INTRAVENOUS EVERY 30 MIN PRN
Status: DISCONTINUED | OUTPATIENT
Start: 2020-05-17 | End: 2020-05-17

## 2020-05-17 RX ORDER — CALCIUM CHLORIDE 100 MG/ML
1 INJECTION INTRAVENOUS; INTRAVENTRICULAR EVERY 10 MIN PRN
Status: DISCONTINUED | OUTPATIENT
Start: 2020-05-17 | End: 2020-05-29

## 2020-05-17 RX ORDER — MIDAZOLAM (PF) 1 MG/ML IN 0.9 % SODIUM CHLORIDE INTRAVENOUS SOLUTION
1-8 CONTINUOUS
Status: DISCONTINUED | OUTPATIENT
Start: 2020-05-17 | End: 2020-05-20 | Stop reason: CLARIF

## 2020-05-17 RX ORDER — NOREPINEPHRINE BITARTRATE 0.06 MG/ML
.03-.4 INJECTION, SOLUTION INTRAVENOUS CONTINUOUS
Status: DISCONTINUED | OUTPATIENT
Start: 2020-05-17 | End: 2020-05-19

## 2020-05-17 RX ORDER — MAGNESIUM SULFATE HEPTAHYDRATE 40 MG/ML
2 INJECTION, SOLUTION INTRAVENOUS DAILY PRN
Status: DISCONTINUED | OUTPATIENT
Start: 2020-05-17 | End: 2020-06-25 | Stop reason: HOSPADM

## 2020-05-17 RX ORDER — ASPIRIN 325 MG
TABLET ORAL
Status: DISCONTINUED | OUTPATIENT
Start: 2020-05-17 | End: 2020-05-17 | Stop reason: HOSPADM

## 2020-05-17 RX ORDER — HEPARIN SODIUM 10000 [USP'U]/100ML
10-50 INJECTION, SOLUTION INTRAVENOUS CONTINUOUS
Status: DISCONTINUED | OUTPATIENT
Start: 2020-05-17 | End: 2020-05-17

## 2020-05-17 RX ORDER — CALCIUM CHLORIDE 100 MG/ML
1 INJECTION INTRAVENOUS; INTRAVENTRICULAR ONCE
Status: DISCONTINUED | OUTPATIENT
Start: 2020-05-17 | End: 2020-05-18 | Stop reason: CLARIF

## 2020-05-17 RX ORDER — LIDOCAINE 40 MG/G
CREAM TOPICAL
Status: DISCONTINUED | OUTPATIENT
Start: 2020-05-17 | End: 2020-06-25 | Stop reason: HOSPADM

## 2020-05-17 RX ORDER — FENTANYL CITRATE 50 UG/ML
INJECTION, SOLUTION INTRAMUSCULAR; INTRAVENOUS
Status: DISCONTINUED | OUTPATIENT
Start: 2020-05-17 | End: 2020-05-17 | Stop reason: HOSPADM

## 2020-05-17 RX ORDER — NICOTINE POLACRILEX 4 MG
15-30 LOZENGE BUCCAL
Status: DISCONTINUED | OUTPATIENT
Start: 2020-05-17 | End: 2020-06-25 | Stop reason: HOSPADM

## 2020-05-17 RX ORDER — MIDAZOLAM (PF) 1 MG/ML IN 0.9 % SODIUM CHLORIDE INTRAVENOUS SOLUTION
CONTINUOUS PRN
Status: COMPLETED | OUTPATIENT
Start: 2020-05-17 | End: 2020-05-17

## 2020-05-17 RX ORDER — IPRATROPIUM BROMIDE AND ALBUTEROL SULFATE 2.5; .5 MG/3ML; MG/3ML
3 SOLUTION RESPIRATORY (INHALATION) EVERY 4 HOURS PRN
Status: DISCONTINUED | OUTPATIENT
Start: 2020-05-17 | End: 2020-06-25 | Stop reason: HOSPADM

## 2020-05-17 RX ORDER — ALBUMIN (HUMAN) 12.5 G/50ML
SOLUTION INTRAVENOUS CONTINUOUS PRN
Status: COMPLETED | OUTPATIENT
Start: 2020-05-17 | End: 2020-05-17

## 2020-05-17 RX ORDER — MAGNESIUM SULFATE HEPTAHYDRATE 40 MG/ML
4 INJECTION, SOLUTION INTRAVENOUS EVERY 4 HOURS PRN
Status: DISCONTINUED | OUTPATIENT
Start: 2020-05-17 | End: 2020-06-25 | Stop reason: HOSPADM

## 2020-05-17 RX ORDER — POTASSIUM CHLORIDE 29.8 MG/ML
20 INJECTION INTRAVENOUS
Status: DISCONTINUED | OUTPATIENT
Start: 2020-05-17 | End: 2020-05-19

## 2020-05-17 RX ORDER — DEXTROSE MONOHYDRATE 25 G/50ML
25-50 INJECTION, SOLUTION INTRAVENOUS
Status: DISCONTINUED | OUTPATIENT
Start: 2020-05-17 | End: 2020-06-25 | Stop reason: HOSPADM

## 2020-05-17 RX ORDER — NALOXONE HYDROCHLORIDE 0.4 MG/ML
.1-.4 INJECTION, SOLUTION INTRAMUSCULAR; INTRAVENOUS; SUBCUTANEOUS
Status: DISCONTINUED | OUTPATIENT
Start: 2020-05-17 | End: 2020-06-25 | Stop reason: HOSPADM

## 2020-05-17 RX ORDER — ALBUMIN (HUMAN) 12.5 G/50ML
50 SOLUTION INTRAVENOUS ONCE
Status: DISCONTINUED | OUTPATIENT
Start: 2020-05-17 | End: 2020-05-17

## 2020-05-17 RX ORDER — ALBUMIN, HUMAN INJ 5% 5 %
50 SOLUTION INTRAVENOUS ONCE
Status: COMPLETED | OUTPATIENT
Start: 2020-05-17 | End: 2020-05-17

## 2020-05-17 RX ORDER — HEPARIN SODIUM 10000 [USP'U]/100ML
10-50 INJECTION, SOLUTION INTRAVENOUS CONTINUOUS
Status: DISCONTINUED | OUTPATIENT
Start: 2020-05-17 | End: 2020-05-20

## 2020-05-17 RX ORDER — HEPARIN SODIUM 1000 [USP'U]/ML
INJECTION, SOLUTION INTRAVENOUS; SUBCUTANEOUS
Status: DISCONTINUED | OUTPATIENT
Start: 2020-05-17 | End: 2020-05-17 | Stop reason: HOSPADM

## 2020-05-17 RX ORDER — ASPIRIN 81 MG/1
81 TABLET, CHEWABLE ORAL DAILY
Status: DISCONTINUED | OUTPATIENT
Start: 2020-05-18 | End: 2020-06-25 | Stop reason: HOSPADM

## 2020-05-17 RX ADMIN — PIPERACILLIN AND TAZOBACTAM 3.38 G: 3; .375 INJECTION, POWDER, FOR SOLUTION INTRAVENOUS at 18:18

## 2020-05-17 RX ADMIN — ALBUMIN HUMAN 25 G: 0.05 INJECTION, SOLUTION INTRAVENOUS at 17:31

## 2020-05-17 RX ADMIN — Medication 50 MCG: at 17:45

## 2020-05-17 RX ADMIN — Medication 50 MCG: at 19:30

## 2020-05-17 RX ADMIN — MIDAZOLAM 2 MG: 1 INJECTION INTRAMUSCULAR; INTRAVENOUS at 17:32

## 2020-05-17 RX ADMIN — TICAGRELOR 90 MG: 90 TABLET ORAL at 20:30

## 2020-05-17 RX ADMIN — CALCIUM CHLORIDE 1 G: 100 INJECTION, SOLUTION INTRAVENOUS at 17:33

## 2020-05-17 RX ADMIN — Medication 1 G: at 23:46

## 2020-05-17 RX ADMIN — Medication 50 MCG/HR: at 17:45

## 2020-05-17 RX ADMIN — MIDAZOLAM 2 MG: 1 INJECTION INTRAMUSCULAR; INTRAVENOUS at 17:00

## 2020-05-17 RX ADMIN — HEPARIN SODIUM 700 UNITS/HR: 10000 INJECTION, SOLUTION INTRAVENOUS at 20:11

## 2020-05-17 RX ADMIN — SODIUM CHLORIDE 1000 ML: 9 INJECTION, SOLUTION INTRAVENOUS at 18:23

## 2020-05-17 RX ADMIN — HEPARIN SODIUM 3 ML/HR: 1000 INJECTION, SOLUTION INTRAVENOUS; SUBCUTANEOUS at 18:20

## 2020-05-17 RX ADMIN — PANTOPRAZOLE SODIUM 40 MG: 40 INJECTION, POWDER, FOR SOLUTION INTRAVENOUS at 20:32

## 2020-05-17 RX ADMIN — MAGNESIUM SULFATE HEPTAHYDRATE 2 G: 40 INJECTION, SOLUTION INTRAVENOUS at 23:01

## 2020-05-17 RX ADMIN — MIDAZOLAM 2 MG: 1 INJECTION INTRAMUSCULAR; INTRAVENOUS at 16:45

## 2020-05-17 RX ADMIN — DEXTROSE MONOHYDRATE 2 MCG/KG/MIN: 50 INJECTION, SOLUTION INTRAVENOUS at 18:39

## 2020-05-17 RX ADMIN — Medication 50 MCG: at 18:15

## 2020-05-17 RX ADMIN — CANGRELOR 4 MCG/KG/MIN: 50 INJECTION, POWDER, LYOPHILIZED, FOR SOLUTION INTRAVENOUS at 13:58

## 2020-05-17 RX ADMIN — SODIUM THIOSULFATE 0.5 MCG/KG/MIN: 250 INJECTION, SOLUTION INTRAVENOUS at 14:30

## 2020-05-17 RX ADMIN — MIDAZOLAM 3 MG: 1 INJECTION INTRAMUSCULAR; INTRAVENOUS at 19:30

## 2020-05-17 RX ADMIN — VANCOMYCIN HYDROCHLORIDE 1750 MG: 1 INJECTION, POWDER, LYOPHILIZED, FOR SOLUTION INTRAVENOUS at 18:20

## 2020-05-17 ASSESSMENT — MIFFLIN-ST. JEOR: SCORE: 1512.75

## 2020-05-17 ASSESSMENT — ACTIVITIES OF DAILY LIVING (ADL): ADLS_ACUITY_SCORE: 18

## 2020-05-17 NOTE — Clinical Note
Potential access sites were evaluated for patency using ultrasound.   The right femoral artery and right femoral vein was selected. Access was obtained under with Sonosite guidance using a standard 18 guage needle with direct visualization of needle entry.

## 2020-05-17 NOTE — ED NOTES
Pt BIBA in V-fib arrest. Pt on Long machine. See CODE SHEET for further details. ROSC at 1237. Cath lab notified. Pt ready for cath lab.

## 2020-05-17 NOTE — PROGRESS NOTES
ECLS Cannulation Note:    Date on: 5/17/2020  Time on: 1311  Surgeon: Albert    Arterial Cannula: 17 Fr. In the Right Femoral Artery, 8 Fr. Arrow Superflex DPC      Venous Cannula: 25 Fr. In the Right Femoral Vein      ECMO components include   [employer] Circuit Lot # [id number]  Oxygenator Lot Number: [id number]  Console Serial Number: [id number]    Cannulation was performed in the Cath Lab, placement was verified by fluoroscopy, cannulas are in good position.  ISTAT and blood cooler are at bedside. Patient's blood type is O, positive.    Tyrel Lindsey, ECMO Specialist, RRT  5/17/2020 6:01 PM

## 2020-05-17 NOTE — PROGRESS NOTES
Pt arrived to ED via EMS at approx 12:30-12:45.  Pt was was ultimately intubated with a size 7.5 ETT secured at 24cm at the lips.  All precautions were taken including clamping ETT and using a viral filter.  Pt was placed on vent with settings of 30/400/100%/8+.      Pt was ultimately transferred to Cardiac Cath Lab.  Pt was placed on ECMO.  A 50cc 8fr IABP was placed around 1500.  Pt was transferred to CT and then to .      Vent settings are 14/400/50%/8+.    Julian Sanders, RRT  5/17/2020

## 2020-05-17 NOTE — H&P
[facility]  Interventional Cardiology History & Physical  May 17, 2020          H&P:   Ramona Weller is a 39 year old male who was admitted on 5/17/2020.  Patient apparently reported chest pain that started yesterday.  Of note, he was working on his pipes at home and using Drano.  Because the Drano stated on the box that it could cause shortness of breath or chest pain, he did not seek immediate medical attention for his 10/10 substernal chest pain.  Patient's significant other convinced him to go to the hospital, and he took a shower.  After coming out of the shower, he apparently had a syncopal episode.  Initial rhythm on arrival of EMS was asystole.  With chest compressions patient eventually had PEA and then eventually had VF in the field.  He received multiple cycles of epinephrine and had ROSC.  He was also intubated in the field.    Patient was brought to the [facility] ED, where he regained a pulse and he was brought up to the Cath Lab for intervention.  The patient had BP of 90/60 as he was being transported from ER but had recurrent cardiac arrest on arrival in cath lab.  Initial ECG showed ST elevation, particularly in aVR suggestive of LMCA stenosis.  Otherwise, it did not localize MI. While in the Cath Lab, he again went into cardiac arrest. He was promptly placed on ECMO. he had single-vessel CAD with thrombotic occlusion of proximal LAD.  He underwent successful aspiration thrombectomy of the proximal LAD.    Witnessed arest (y/n): yes  Home or public location (y/n): home  Bystander CPR (y/n): unknown  Initial rhythm: Asystole  Did they have intermittent ROSC (y/n): yes  # of shocks: no  Epi: 3 mg  Amio: unknown  O2 sat en route: 92    Initial rhythm in the cath lab: SR    Father Pilo (next of kin), decision maker: [phone] dad Pilo Bates (Dad's wife): Audrey    Mother of children (ex-girlfriend): [phone]            Medications:   I have reviewed this patient's current medications    No past medical history on file.    No family history on file.  Social History     Socioeconomic History     Marital status: Not on file     Spouse name: Not on file     Number of children: Not on file     Years of education: Not on file     Highest education level: Not on file   Occupational History     Not on file   Social Needs     Financial resource strain: Not on file     Food insecurity     Worry: Not on file     Inability: Not on file     Transportation needs     Medical: Not on file     Non-medical: Not on file   Tobacco Use     Smoking status: Not on file   Substance and Sexual Activity     Alcohol use: Not on file     Drug use: Not on file     Sexual activity: Not on file   Lifestyle     Physical activity     Days per week: Not on file     Minutes per session: Not on file     Stress: Not on file   Relationships     Social connections     Talks on phone: Not on file     Gets together: Not on file     Attends Zoroastrianism service: Not on file     Active member of club or organization: Not on file     Attends meetings of clubs or organizations: Not on file     Relationship status: Not on file     Intimate partner violence     Fear of current or ex partner: Not on file     Emotionally abused: Not on file     Physically abused: Not on file     Forced sexual activity: Not on file   Other Topics Concern     Not on file   Social History Narrative     Not on file            Review of Systems:   Constitutional: negative  Skin: negative  Musculoskeletal: negative  Eyes: negative  ENT: negative  Endocrine: negative  Respiratory: negative  Cardiovascular: negative  Heme: negative  Lymph: negative  GI: negative  : negative  Neuro: as above  Psych: as above            Physical Exam:   @Vitals: There were no vitals taken for this visit.  BMI= There is no height or weight on file to calculate BMI.   GENERAL APPEARANCE: Intubated, sedated. NAD.HEENT: JVP 7.  No icterus, PERRL 2 mm, ETT in place, OG tube in place  CARDIOVASCULAR: regular rate and rhythm, normal S1 and S2, no S3 or S4 and no murmur, click or rub. Normal PMI. Pulses dopplerable.  RESP: Coarse bilaterally. Mechanical ventilation.    GASTRO: Soft, bowel sounds hypoactive but present  GENITOURINARY: Silva in place.  EXTREMITIES: Cool, 1+ edema, pulses dopplered, as above. VA ECMO cannulas in right groin, ThermoGard and no IO in place right Tibia  NEURO: Sedated and intubated, Pupils equal and reactive  INTEGUMENTARY: No rashes. Cannula/Line sites CDI  LINES/TUBES/DRAINS: (noted below) V-A ECMO Cannulas R groin, arterial sheath and ThermoGard in L groin. R radial Arterial line. ETT. OG. Silva Catheter.    Arterial Blood Gas:   Recent Labs   Lab 05/17/20  1334   PH 7.21*   PCO2 36   PO2 74*   HCO3 14*   O2PER 100.0     CXR: pending   There were no vitals filed for this visit.No intake/output data recorded.  Recent Labs   Lab 05/17/20  1334      POTASSIUM 4.5   *     No components found for: URINE   No lab results found in last 7 days.     No data recorded   Recent Labs   Lab 05/17/20  1334   HGB 11.6*     No lab results found in last 7 days.  Recent Labs   Lab 05/17/20  1334   *       Lines:           Data:   All lab results reviewed past 24 hours    No results found for this or any previous visit (from the past 24 hour(s)).           Assessment and Plan:   Ramona Weller is a 89 year old male who was admitted on 5/17/2020.    Neurology: Intubated, sedated, paralyzed. Cooled to 34 degrees.  --continue versed, fentanyl, and cis as needed to maintain paralysis - RASS goal -4 to -5    Cardiovascular / Hemodynamics: Refractory VF arrest.  NAZIA to proximal LAD.  Peripheral V-A ECMO inserted for cardiac arrest. LA 16. Will get 4 hours of cangrelor, loaded with ticagrelor.   TTE: pending  EKG: pending.   --wean pressors/inotropes as able  --echo tomorrow with ECMO turndown  --continue ASA  81mg and ticagrelor 90mg BID  --hold temp at 34  --ACT goal 180-200  --hold lipitor for now given likely hepatic injury during arrest  --hold ACE/ARB for now given likely reduced renal fxn after arrest  --holding beta blocker given shock    Pulmonary: Vent Settings:  ETT above the chaim.  Now weaning vent requirements.  CXR: Lines in stable position.   --wean vent as able  --daily CXR  Chest CT  --Q2h ABGs for now  --consider scheduled duonebs if signs of lung dz, currently PRN     GI and Nutrition: No known medical hx.    --monitor BID LFTs  --NPO while cooled - nutrition consult pending feeding tube placement once he is warmed   --bowel regimen - on hold for now due to hypothermia  --GI Prophylaxis: PPI    Renal, Fluid and Electrolytes: Cr 1, UOP pending.   --monitor urine output  --maintain K>3 and Mg>2    Infectious Disease: No signs of infection. Leukocytosis c/w arrest. Blood cultures collected.   --vancomycin/zosyn x5 days for ECMO  --daily blood cultures  --monitor for signs of infection given cooling, lines, and leukocytosis   Hematology and Oncology: Receiving heparin for ECMO and ASA/ticagrelor for NAZIA.   --cryo PRN fibrinogen < 200; FFP for INR >2  --Transfuse for Hgb<10  --heparin gtt for ECMO with ACT goal 160-180  --US LE w/ arterial duplex per ECMO protocol   --DVT PPX: Heparin as above   Endocrinology: No known medical history. BG elevated.  --insulin gtt  --f/u HgbA1c    Lines: PA catheter May 17, 2020  R femoral arterial and venous ECMO cannulae May 17, 2020  L femoral arterial line May 17, 2020  R radial arterial line May 17, 2020  ETT May 17, 2020  Silva catheter May 17, 2020  OG tube May 17, 2020  Restraint: needed    Current lines are required for patient management       Family update by me today: Yes     Code Status:    The pt was discussed and evaluated with Dr. Matamoros, Cardiologist who agrees with the assessment and plan above.     Amber Kamara MD  [facility]  [facility]  Fellow, PGY-6  [phone]

## 2020-05-17 NOTE — Clinical Note
Stent deployed in the left anterior descending. Max pressure = 12 james. Total duration = 10 seconds.

## 2020-05-17 NOTE — PHARMACY-VANCOMYCIN DOSING SERVICE
Pharmacy Vancomycin Initial Note  Date of Service May 17, 2020  Patient's  1901  89 year old, male    Indication: Aspiration Pneumonia    Current estimated CrCl = CrCl cannot be calculated (No successful lab value found.).    Creatinine for last 3 days  No results found for requested labs within last 72 hours.    Recent Vancomycin Level(s) for last 3 days  No results found for requested labs within last 72 hours.      Vancomycin IV Administrations (past 72 hours)      No vancomycin orders with administrations in past 72 hours.                Nephrotoxins and other renal medications (From now, onward)    Start     Dose/Rate Route Frequency Ordered Stop    20 1830  vancomycin (VANCOCIN) 1,750 mg in sodium chloride 0.9 % 500 mL intermittent infusion      1,750 mg  over 2 Hours Intravenous EVERY 24 HOURS 20 1730 20 1829    20 1800  norepinephrine (LEVOPHED) 16 mg in  mL infusion      0.03-0.4 mcg/kg/min × 106 kg (Dosing Weight)  3-39.8 mL/hr  Intravenous CONTINUOUS 20 1544      20 1800  phenylephrine (HERBERTH-SYNEPHRINE) 50 mg in sodium chloride 0.9 % 250 mL infusion      0.5-6 mcg/kg/min × 106 kg (Dosing Weight)  15.9-190.8 mL/hr  Intravenous CONTINUOUS 20 1544      20 1800  vasopressin (VASOSTRICT) 40 Units in sodium chloride 0.9 % 40 mL infusion      0.5-4 Units/hr  0.5-4 mL/hr  Intravenous CONTINUOUS 20 1544      20 1800  piperacillin-tazobactam (ZOSYN) 3.375 g vial to attach to  mL bag      3.375 g  over 30 Minutes Intravenous EVERY 6 HOURS 20 1544 20 1759          Contrast Orders - past 72 hours (72h ago, onward)    None                Plan:  1.  Start vancomycin  1750 mg IV q24h.   2.  Goal Trough Level: ~15 mcg/mL  3.  Pharmacy will check trough levels as appropriate in 1-3 Days.    4. Serum creatinine levels will be ordered daily for the first week of therapy and at least twice weekly for subsequent weeks.    5. [place]  method utilized to dose vancomycin therapy: Method 2    Pattie Shirley, [facility]

## 2020-05-17 NOTE — ED PROVIDER NOTES
ED Provider Note  [facility]      History     Chief Complaint   Patient presents with     Cardiac Arrest     HPI  Scot Bishop is a 39 year old male who presents in cardiac arrest.  Per EMS patient has no known past medical history, he developed chest pain last evening.  Patient attributed this to a cleaning product he had been using.  Patient plan to go to the emergency department at this morning after showering but he collapsed before this.  Upon arrival mesh notes patient was in PEA, he was also noted to be in asystole as well as V. fib.  Patient received 4 shocks, 3 doses of epinephrine as well as 300 mg of amiodarone.  An LMA was placed and access was obtained with a right tibial IO.  CPR was in progress upon arrival.    Past Medical History  No past medical history on file.  No past surgical history on file.  No current outpatient medications on file.    Allergies not on file  Past medical history, past surgical history, medications, and allergies were reviewed with the patient. Additional pertinent items: None    Family History  No family history on file.  Family history was reviewed with the patient. Additional pertinent items: None    Social History  Social History     Tobacco Use     Smoking status: Not on file   Substance Use Topics     Alcohol use: Not on file     Drug use: Not on file      Social history was reviewed with the patient. Additional pertinent items: None    Review of Systems   Unable to perform ROS: Acuity of condition         Physical Exam      Physical Exam  Constitutional:       Interventions: Backboard in place.      Comments: LMA in place.   HENT:      Head: Normocephalic and atraumatic.   Eyes:      Pupils: Pupils are equal, round, and reactive to light.   Cardiovascular:      Comments: No pulse palpated upon arrival.  Pulmonary:      Comments: Occasional spontaneous respirations present during CPR.  Abdominal:      Palpations: Abdomen is soft.          ED Course      [facility] [place]    -Intubation    Date/Time: 5/17/2020 2:29 PM  Performed by: Mariano Gabriel DO  Authorized by: Mariano Gabriel DO       PRE-PROCEDURE DETAILS     Patient status:  Unresponsive    Pretreatment medications:  None    Paralytics:  Rocuronium      PROCEDURE DETAILS     Preoxygenation:  ANDI/LMA    CPR in progress: no      Intubation method:  Oral    Oral intubation technique:  Video-assisted    Laryngoscope blade:  Mac 3    Tube size (mm):  7.5    Tube type:  Cuffed    Number of attempts:  1    Cricoid pressure: no      Tube visualized through cords: yes      PLACEMENT ASSESSMENT     ETT to teeth:  24    Tube secured with:  ETT robert    Breath sounds:  Equal and absent over the epigastrium    Placement verification: chest rise, direct visualization, equal breath sounds and ETCO2 detector      CXR findings:  ETT in proper place  PROCEDURE   Patient Tolerance:  Patient tolerated the procedure well with no immediate complications                   EKG Interpretation:      Interpreted by Mariano Gabriel DO  Time reviewed: 1247  Symptoms at time of EKG: post cardiac arrest   Rhythm: junctional  Rate: 93  Axis: Normal  Ectopy: none  Conduction: normal  ST Segments/ T Waves: ST Segement Elevation aVL, aVR and V2, ST depressions in II, III, aVF, V3, V5, and V6.  Q Waves: nonspecific  Comparison to prior: No old EKG available    Clinical Impression: myocardial infarction          Critical Care time was 30 minutes for this patient excluding procedures.                    Results for orders placed or performed during the hospital encounter of 05/17/20   Troponin I     Status: Abnormal   Result Value Ref Range    Troponin I ES 0.276 (HH) 0.000 - 0.045 ug/L   Comprehensive metabolic panel     Status: Abnormal   Result Value Ref Range    Sodium 136 133 - 144 mmol/L    Potassium 3.3 (L) 3.4 - 5.3 mmol/L    Chloride 101 94 - 109 mmol/L    Carbon  Dioxide 17 (L) 20 - 32 mmol/L    Anion Gap 18 (H) 3 - 14 mmol/L    Glucose 394 (H) 70 - 99 mg/dL    Urea Nitrogen 15 7 - 30 mg/dL    Creatinine 1.39 (H) 0.66 - 1.25 mg/dL    GFR Estimate 63 >60 mL/min/[1.73_m2]    GFR Estimate If Black 73 >60 mL/min/[1.73_m2]    Calcium 8.2 (L) 8.5 - 10.1 mg/dL    Bilirubin Total 0.1 (L) 0.2 - 1.3 mg/dL    Albumin 3.3 (L) 3.4 - 5.0 g/dL    Protein Total 6.9 6.8 - 8.8 g/dL    Alkaline Phosphatase 86 40 - 150 U/L     (HH) 0 - 70 U/L     (HH) 0 - 45 U/L   CBC with platelets differential     Status: Abnormal   Result Value Ref Range    WBC 10.1 4.0 - 11.0 10e9/L    RBC Count 5.13 4.4 - 5.9 10e12/L    Hemoglobin 15.6 13.3 - 17.7 g/dL    Hematocrit 50.6 40.0 - 53.0 %    MCV 99 78 - 100 fl    MCH 30.4 26.5 - 33.0 pg    MCHC 30.8 (L) 31.5 - 36.5 g/dL    RDW 12.0 10.0 - 15.0 %    Platelet Count 93 (L) 150 - 450 10e9/L    Diff Method Manual Differential     % Neutrophils 39.5 %    % Lymphocytes 48.0 %    % Monocytes 5.9 %    % Eosinophils 0.8 %    % Basophils 0.0 %    % Metamyelocytes 4.2 %    % Myelocytes 0.8 %    % Promyelocytes 0.8 %    Nucleated RBCs 1 (H) 0 /100    Absolute Neutrophil 4.0 1.6 - 8.3 10e9/L    Absolute Lymphocytes 4.8 0.8 - 5.3 10e9/L    Absolute Monocytes 0.6 0.0 - 1.3 10e9/L    Absolute Eosinophils 0.1 0.0 - 0.7 10e9/L    Absolute Basophils 0.0 0.0 - 0.2 10e9/L    Absolute Metamyelocytes 0.4 (H) 0 10e9/L    Absolute Myelocytes 0.1 (H) 0 10e9/L    Absolute Promyeloctyes 0.1 (H) 0 10e9/L    Absolute Nucleated RBC 0.1     RBC Morphology Normal     Platelet Estimate Confirming automated cell count    INR     Status: Abnormal   Result Value Ref Range    INR 1.25 (H) 0.86 - 1.14   ISTAT INR POCT     Status: Abnormal   Result Value Ref Range    ISTAT INR 2.4 (H) 0.86 - 1.14   ISTAT gases elec ica gluc lyudmila POCT     Status: Abnormal   Result Value Ref Range    Ph Venous 6.83 (LL) 7.32 - 7.43 pH    PCO2 Venous 100 (HH) 40 - 50 mm Hg    PO2 Venous 14 (L) 25 - 47 mm Hg     Bicarbonate Venous 16 (L) 21 - 28 mmol/L    O2 Sat Venous 6 %    Sodium 136 133 - 144 mmol/L    Potassium 3.5 3.4 - 5.3 mmol/L    Glucose 366 (H) 70 - 99 mg/dL    Calcium Ionized 4.7 4.4 - 5.2 mg/dL    Hemoglobin 16.3 13.3 - 17.7 g/dL    Hematocrit - POCT 48 40.0 - 53.0 %PCV   ABO/Rh type and screen     Status: None   Result Value Ref Range    ABO O     RH(D) Pos     Antibody Screen Neg     Test Valid Only At          [facility]    Specimen Expires 05/20/2020      Medications - No data to display     Assessments & Plan (with Medical Decision Making)   Is a 39-year-old male who presents via EMS in cardiac arrest.  Per EMS patient developed chest pain yesterday and then had a cardiac arrest this morning.  Patient was initially in PEA but he did also have asystole as well as V. fib requiring shocks.  Upon arrival patient did have some spontaneous respirations in addition to bagging during CPR and pupils were reactive to light.  Given an additional 2 doses of epinephrine.  Additional IO was placed.  Upon pulse check, patient was noted to have ROSC.  ECG shows STEMI.  LMA was removed and patient was intubated.  Patient was then transferred to the Cath Lab.    I have reviewed the nursing notes. I have reviewed the findings, diagnosis, plan and need for follow up with the patient.    New Prescriptions    No medications on file       Final diagnoses:   Cardiac arrest (H)       --  Mariano Gabriel DO   Emergency Medicine   [facility], EMERGENCY DEPARTMENT  5/17/2020     Mariano Gabriel,   05/17/20 [id number]

## 2020-05-17 NOTE — Clinical Note
The first balloon was inserted into the left anterior descending.Max pressure = 12 james. Total duration = 10 seconds.

## 2020-05-18 ENCOUNTER — APPOINTMENT (OUTPATIENT)
Dept: GENERAL RADIOLOGY | Facility: CLINIC | Age: 40
End: 2020-05-18
Attending: INTERNAL MEDICINE
Payer: MEDICAID

## 2020-05-18 ENCOUNTER — APPOINTMENT (OUTPATIENT)
Dept: CARDIOLOGY | Facility: CLINIC | Age: 40
End: 2020-05-18
Attending: INTERNAL MEDICINE
Payer: MEDICAID

## 2020-05-18 ENCOUNTER — ALLIED HEALTH/NURSE VISIT (OUTPATIENT)
Dept: NEUROLOGY | Facility: CLINIC | Age: 40
End: 2020-05-18
Attending: PSYCHIATRY & NEUROLOGY
Payer: MEDICAID

## 2020-05-18 ENCOUNTER — DOCUMENTATION ONLY (OUTPATIENT)
Dept: OTHER | Facility: CLINIC | Age: 40
End: 2020-05-18

## 2020-05-18 DIAGNOSIS — I46.9 CARDIAC ARREST (H): Primary | ICD-10-CM

## 2020-05-18 LAB
ALBUMIN SERPL-MCNC: 3 G/DL (ref 3.4–5)
ALBUMIN SERPL-MCNC: 3.2 G/DL (ref 3.4–5)
ALBUMIN SERPL-MCNC: 3.5 G/DL (ref 3.4–5)
ALBUMIN SERPL-MCNC: 3.6 G/DL (ref 3.4–5)
ALP SERPL-CCNC: 44 U/L (ref 40–150)
ALP SERPL-CCNC: 46 U/L (ref 40–150)
ALP SERPL-CCNC: 48 U/L (ref 40–150)
ALP SERPL-CCNC: 51 U/L (ref 40–150)
ALT SERPL W P-5'-P-CCNC: 705 U/L (ref 0–70)
ALT SERPL W P-5'-P-CCNC: 795 U/L (ref 0–70)
ALT SERPL W P-5'-P-CCNC: 895 U/L (ref 0–70)
ALT SERPL W P-5'-P-CCNC: 932 U/L (ref 0–70)
ANGLE RATE OF CLOT STRENGTH: 60.4 DEGREES (ref 53–72)
ANGLE RATE OF CLOT STRENGTH: 62.6 DEGREES (ref 53–72)
ANION GAP SERPL CALCULATED.3IONS-SCNC: 6 MMOL/L (ref 3–14)
ANION GAP SERPL CALCULATED.3IONS-SCNC: 8 MMOL/L (ref 3–14)
ANION GAP SERPL CALCULATED.3IONS-SCNC: 9 MMOL/L (ref 3–14)
ANION GAP SERPL CALCULATED.3IONS-SCNC: 9 MMOL/L (ref 3–14)
APTT PPP: 68 SEC (ref 22–37)
APTT PPP: 79 SEC (ref 22–37)
APTT PPP: 90 SEC (ref 22–37)
APTT PPP: 92 SEC (ref 22–37)
AST SERPL W P-5'-P-CCNC: 1091 U/L (ref 0–45)
AST SERPL W P-5'-P-CCNC: 1477 U/L (ref 0–45)
AST SERPL W P-5'-P-CCNC: 1777 U/L (ref 0–45)
AST SERPL W P-5'-P-CCNC: 868 U/L (ref 0–45)
AT III ACT/NOR PPP CHRO: 73 % (ref 85–135)
BASE DEFICIT BLDA-SCNC: 0.1 MMOL/L
BASE DEFICIT BLDA-SCNC: 0.6 MMOL/L
BASE DEFICIT BLDA-SCNC: 1.3 MMOL/L
BASE DEFICIT BLDA-SCNC: 2 MMOL/L
BASE DEFICIT BLDA-SCNC: 28.3 MMOL/L
BASE DEFICIT BLDA-SCNC: 3.1 MMOL/L
BASE DEFICIT BLDA-SCNC: 3.3 MMOL/L
BASE DEFICIT BLDA-SCNC: 3.7 MMOL/L
BASE DEFICIT BLDA-SCNC: 4.3 MMOL/L
BASE DEFICIT BLDA-SCNC: 4.8 MMOL/L
BASE DEFICIT BLDA-SCNC: 5.6 MMOL/L
BASE DEFICIT BLDA-SCNC: 5.7 MMOL/L
BASE DEFICIT BLDV-SCNC: 0.7 MMOL/L
BASE DEFICIT BLDV-SCNC: 4.4 MMOL/L
BASE EXCESS BLDA CALC-SCNC: 0.2 MMOL/L
BASE EXCESS BLDA CALC-SCNC: 0.9 MMOL/L
BILIRUB SERPL-MCNC: 0.7 MG/DL (ref 0.2–1.3)
BILIRUB SERPL-MCNC: 0.8 MG/DL (ref 0.2–1.3)
BLD PROD TYP BPU: NORMAL
BLD UNIT ID BPU: 0
BLOOD PRODUCT CODE: NORMAL
BPU ID: NORMAL
BUN SERPL-MCNC: 20 MG/DL (ref 7–30)
BUN SERPL-MCNC: 21 MG/DL (ref 7–30)
BUN SERPL-MCNC: 21 MG/DL (ref 7–30)
BUN SERPL-MCNC: 22 MG/DL (ref 7–30)
CA-I BLD-MCNC: 4.4 MG/DL (ref 4.4–5.2)
CA-I BLD-MCNC: 4.6 MG/DL (ref 4.4–5.2)
CALCIUM SERPL-MCNC: 7.4 MG/DL (ref 8.5–10.1)
CALCIUM SERPL-MCNC: 7.4 MG/DL (ref 8.5–10.1)
CALCIUM SERPL-MCNC: 7.5 MG/DL (ref 8.5–10.1)
CALCIUM SERPL-MCNC: 7.6 MG/DL (ref 8.5–10.1)
CHLORIDE SERPL-SCNC: 113 MMOL/L (ref 94–109)
CHLORIDE SERPL-SCNC: 113 MMOL/L (ref 94–109)
CHLORIDE SERPL-SCNC: 114 MMOL/L (ref 94–109)
CHLORIDE SERPL-SCNC: 115 MMOL/L (ref 94–109)
CI HYPOCOAGULATION INDEX: 0.6 RATIO (ref 0–3)
CI HYPOCOAGULATION INDEX: 2.5 RATIO (ref 0–3)
CO2 SERPL-SCNC: 22 MMOL/L (ref 20–32)
CO2 SERPL-SCNC: 22 MMOL/L (ref 20–32)
CO2 SERPL-SCNC: 23 MMOL/L (ref 20–32)
CO2 SERPL-SCNC: 24 MMOL/L (ref 20–32)
CREAT SERPL-MCNC: 1.03 MG/DL (ref 0.66–1.25)
CREAT SERPL-MCNC: 1.04 MG/DL (ref 0.66–1.25)
CREAT SERPL-MCNC: 1.12 MG/DL (ref 0.66–1.25)
CREAT SERPL-MCNC: 1.2 MG/DL (ref 0.66–1.25)
CRP SERPL-MCNC: 16 MG/L (ref 0–8)
D DIMER PPP FEU-MCNC: 11.8 UG/ML FEU (ref 0–0.5)
D DIMER PPP FEU-MCNC: >20 UG/ML FEU (ref 0–0.5)
ERYTHROCYTE [DISTWIDTH] IN BLOOD BY AUTOMATED COUNT: 12.8 % (ref 10–15)
ERYTHROCYTE [DISTWIDTH] IN BLOOD BY AUTOMATED COUNT: 12.9 % (ref 10–15)
ERYTHROCYTE [DISTWIDTH] IN BLOOD BY AUTOMATED COUNT: 13 % (ref 10–15)
ERYTHROCYTE [DISTWIDTH] IN BLOOD BY AUTOMATED COUNT: 13.1 % (ref 10–15)
ERYTHROCYTE [SEDIMENTATION RATE] IN BLOOD BY WESTERGREN METHOD: 8 MM/H (ref 0–15)
FIBRINOGEN PPP-MCNC: 239 MG/DL (ref 200–420)
FIBRINOGEN PPP-MCNC: 332 MG/DL (ref 200–420)
G ACTUAL CLOT STRENGTH: 7.1 KD/SC (ref 4.5–11)
G ACTUAL CLOT STRENGTH: 7.4 KD/SC (ref 4.5–11)
GFR SERPL CREATININE-BSD FRML MDRD: 75 ML/MIN/{1.73_M2}
GFR SERPL CREATININE-BSD FRML MDRD: 81 ML/MIN/{1.73_M2}
GFR SERPL CREATININE-BSD FRML MDRD: 89 ML/MIN/{1.73_M2}
GFR SERPL CREATININE-BSD FRML MDRD: 90 ML/MIN/{1.73_M2}
GLUCOSE BLD-MCNC: 114 MG/DL (ref 70–99)
GLUCOSE BLD-MCNC: 120 MG/DL (ref 70–99)
GLUCOSE BLD-MCNC: 123 MG/DL (ref 70–99)
GLUCOSE BLD-MCNC: 132 MG/DL (ref 70–99)
GLUCOSE BLD-MCNC: 144 MG/DL (ref 70–99)
GLUCOSE BLD-MCNC: 496 MG/DL (ref 70–99)
GLUCOSE BLDC GLUCOMTR-MCNC: 110 MG/DL (ref 70–99)
GLUCOSE BLDC GLUCOMTR-MCNC: 110 MG/DL (ref 70–99)
GLUCOSE BLDC GLUCOMTR-MCNC: 119 MG/DL (ref 70–99)
GLUCOSE BLDC GLUCOMTR-MCNC: 121 MG/DL (ref 70–99)
GLUCOSE BLDC GLUCOMTR-MCNC: 125 MG/DL (ref 70–99)
GLUCOSE BLDC GLUCOMTR-MCNC: 132 MG/DL (ref 70–99)
GLUCOSE BLDC GLUCOMTR-MCNC: 136 MG/DL (ref 70–99)
GLUCOSE SERPL-MCNC: 118 MG/DL (ref 70–99)
GLUCOSE SERPL-MCNC: 120 MG/DL (ref 70–99)
GLUCOSE SERPL-MCNC: 129 MG/DL (ref 70–99)
GLUCOSE SERPL-MCNC: 136 MG/DL (ref 70–99)
HCO3 BLD-SCNC: 20 MMOL/L (ref 21–28)
HCO3 BLD-SCNC: 21 MMOL/L (ref 21–28)
HCO3 BLD-SCNC: 21 MMOL/L (ref 21–28)
HCO3 BLD-SCNC: 22 MMOL/L (ref 21–28)
HCO3 BLD-SCNC: 23 MMOL/L (ref 21–28)
HCO3 BLD-SCNC: 23 MMOL/L (ref 21–28)
HCO3 BLD-SCNC: 24 MMOL/L (ref 21–28)
HCO3 BLD-SCNC: 24 MMOL/L (ref 21–28)
HCO3 BLD-SCNC: 25 MMOL/L (ref 21–28)
HCO3 BLD-SCNC: 7 MMOL/L (ref 21–28)
HCO3 BLDA-SCNC: 22 MMOL/L (ref 21–28)
HCO3 BLDA-SCNC: 22 MMOL/L (ref 21–28)
HCO3 BLDV-SCNC: 23 MMOL/L (ref 21–28)
HCO3 BLDV-SCNC: 26 MMOL/L (ref 21–28)
HCT VFR BLD AUTO: 29.3 % (ref 40–53)
HCT VFR BLD AUTO: 31.8 % (ref 40–53)
HCT VFR BLD AUTO: 33.7 % (ref 40–53)
HCT VFR BLD AUTO: 34 % (ref 40–53)
HGB BLD-MCNC: 10.3 G/DL (ref 13.3–17.7)
HGB BLD-MCNC: 10.8 G/DL (ref 13.3–17.7)
HGB BLD-MCNC: 11.4 G/DL (ref 13.3–17.7)
HGB BLD-MCNC: 11.5 G/DL (ref 13.3–17.7)
HGB BLD-MCNC: 13.1 G/DL (ref 13.3–17.7)
HGB FREE PLAS-MCNC: 40 MG/DL
INR PPP: 1.24 (ref 0.86–1.14)
INR PPP: 1.24 (ref 0.86–1.14)
INR PPP: 1.29 (ref 0.86–1.14)
INR PPP: 1.33 (ref 0.86–1.14)
INTERPRETATION ECG - MUSE: NORMAL
K TIME TO SPEC CLOT STRENGTH: 2 MINUTE (ref 1–3)
K TIME TO SPEC CLOT STRENGTH: 2.2 MINUTE (ref 1–3)
KCT BLD-ACNC: 150 SEC (ref 75–150)
KCT BLD-ACNC: 154 SEC (ref 75–150)
KCT BLD-ACNC: 159 SEC (ref 75–150)
KCT BLD-ACNC: 159 SEC (ref 75–150)
KCT BLD-ACNC: 163 SEC (ref 75–150)
LACTATE BLD-SCNC: 17 MMOL/L (ref 0.7–2)
LACTATE BLD-SCNC: 2.3 MMOL/L (ref 0.7–2)
LACTATE BLD-SCNC: 2.8 MMOL/L (ref 0.7–2)
LACTATE BLD-SCNC: 3.6 MMOL/L (ref 0.7–2)
LACTATE BLD-SCNC: 4.5 MMOL/L (ref 0.7–2)
LDH SERPL L TO P-CCNC: 2251 U/L (ref 85–227)
LMWH PPP CHRO-ACNC: 0.26 IU/ML
LMWH PPP CHRO-ACNC: 0.28 IU/ML
LMWH PPP CHRO-ACNC: 0.33 IU/ML
LMWH PPP CHRO-ACNC: 0.43 IU/ML
LY30 LYSIS AT 30 MINUTES: 0 % (ref 0–8)
LY30 LYSIS AT 30 MINUTES: 0 % (ref 0–8)
LY60 LYSIS AT 60 MINUTES: 0.5 % (ref 0–15)
LY60 LYSIS AT 60 MINUTES: 0.7 % (ref 0–15)
MA MAXIMUM CLOT STRENGTH: 58.8 MM (ref 50–70)
MA MAXIMUM CLOT STRENGTH: 59.7 MM (ref 50–70)
MAGNESIUM SERPL-MCNC: 2 MG/DL (ref 1.6–2.3)
MAGNESIUM SERPL-MCNC: 2 MG/DL (ref 1.6–2.3)
MAGNESIUM SERPL-MCNC: 2.4 MG/DL (ref 1.6–2.3)
MAGNESIUM SERPL-MCNC: 2.6 MG/DL (ref 1.6–2.3)
MCH RBC QN AUTO: 30 PG (ref 26.5–33)
MCH RBC QN AUTO: 30.3 PG (ref 26.5–33)
MCH RBC QN AUTO: 30.5 PG (ref 26.5–33)
MCH RBC QN AUTO: 31.4 PG (ref 26.5–33)
MCHC RBC AUTO-ENTMCNC: 33.8 G/DL (ref 31.5–36.5)
MCHC RBC AUTO-ENTMCNC: 33.8 G/DL (ref 31.5–36.5)
MCHC RBC AUTO-ENTMCNC: 34 G/DL (ref 31.5–36.5)
MCHC RBC AUTO-ENTMCNC: 35.2 G/DL (ref 31.5–36.5)
MCV RBC AUTO: 89 FL (ref 78–100)
MCV RBC AUTO: 89 FL (ref 78–100)
MCV RBC AUTO: 90 FL (ref 78–100)
MCV RBC AUTO: 90 FL (ref 78–100)
O2/TOTAL GAS SETTING VFR VENT: 100 %
O2/TOTAL GAS SETTING VFR VENT: 40 %
O2/TOTAL GAS SETTING VFR VENT: 50 %
OXYHGB MFR BLD: 91 % (ref 92–100)
OXYHGB MFR BLD: 92 % (ref 92–100)
OXYHGB MFR BLD: 95 % (ref 92–100)
OXYHGB MFR BLD: 97 % (ref 92–100)
OXYHGB MFR BLD: 97 % (ref 92–100)
OXYHGB MFR BLD: 99 % (ref 92–100)
OXYHGB MFR BLD: 99 % (ref 92–100)
OXYHGB MFR BLDA: 98 % (ref 75–100)
OXYHGB MFR BLDA: 99 % (ref 75–100)
OXYHGB MFR BLDV: 79 %
OXYHGB MFR BLDV: 86 %
PCO2 BLD: 36 MM HG (ref 35–45)
PCO2 BLD: 36 MM HG (ref 35–45)
PCO2 BLD: 38 MM HG (ref 35–45)
PCO2 BLD: 38 MM HG (ref 35–45)
PCO2 BLD: 39 MM HG (ref 35–45)
PCO2 BLD: 39 MM HG (ref 35–45)
PCO2 BLD: 40 MM HG (ref 35–45)
PCO2 BLD: 40 MM HG (ref 35–45)
PCO2 BLD: 41 MM HG (ref 35–45)
PCO2 BLD: 43 MM HG (ref 35–45)
PCO2 BLD: 44 MM HG (ref 35–45)
PCO2 BLD: 49 MM HG (ref 35–45)
PCO2 BLDA: 41 MM HG (ref 35–45)
PCO2 BLDA: 48 MM HG (ref 35–45)
PCO2 BLDV: 50 MM HG (ref 40–50)
PCO2 BLDV: 54 MM HG (ref 40–50)
PH BLD: 6.76 PH (ref 7.35–7.45)
PH BLD: 7.33 PH (ref 7.35–7.45)
PH BLD: 7.34 PH (ref 7.35–7.45)
PH BLD: 7.35 PH (ref 7.35–7.45)
PH BLD: 7.37 PH (ref 7.35–7.45)
PH BLD: 7.37 PH (ref 7.35–7.45)
PH BLD: 7.38 PH (ref 7.35–7.45)
PH BLD: 7.41 PH (ref 7.35–7.45)
PH BLD: 7.43 PH (ref 7.35–7.45)
PH BLD: 7.44 PH (ref 7.35–7.45)
PH BLDA: 7.26 PH (ref 7.35–7.45)
PH BLDA: 7.34 PH (ref 7.35–7.45)
PH BLDV: 7.24 PH (ref 7.32–7.43)
PH BLDV: 7.32 PH (ref 7.32–7.43)
PHOSPHATE SERPL-MCNC: 3.4 MG/DL (ref 2.5–4.5)
PLATELET # BLD AUTO: 124 10E9/L (ref 150–450)
PLATELET # BLD AUTO: 147 10E9/L (ref 150–450)
PLATELET # BLD AUTO: 173 10E9/L (ref 150–450)
PLATELET # BLD AUTO: 176 10E9/L (ref 150–450)
PLATELET INHIBITION WITH AA: 100 %
PLATELET INHIBITION WITH ADP: 80 %
PO2 BLD: 113 MM HG (ref 80–105)
PO2 BLD: 120 MM HG (ref 80–105)
PO2 BLD: 209 MM HG (ref 80–105)
PO2 BLD: 230 MM HG (ref 80–105)
PO2 BLD: 522 MM HG (ref 80–105)
PO2 BLD: 68 MM HG (ref 80–105)
PO2 BLD: 73 MM HG (ref 80–105)
PO2 BLD: 76 MM HG (ref 80–105)
PO2 BLD: 81 MM HG (ref 80–105)
PO2 BLD: 83 MM HG (ref 80–105)
PO2 BLD: 84 MM HG (ref 80–105)
PO2 BLD: 85 MM HG (ref 80–105)
PO2 BLDA: 445 MM HG (ref 80–105)
PO2 BLDA: 499 MM HG (ref 80–105)
PO2 BLDV: 48 MM HG (ref 25–47)
PO2 BLDV: 63 MM HG (ref 25–47)
POTASSIUM BLD-SCNC: 3.5 MMOL/L (ref 3.4–5.3)
POTASSIUM SERPL-SCNC: 3.6 MMOL/L (ref 3.4–5.3)
POTASSIUM SERPL-SCNC: 3.8 MMOL/L (ref 3.4–5.3)
POTASSIUM SERPL-SCNC: 4.1 MMOL/L (ref 3.4–5.3)
POTASSIUM SERPL-SCNC: 4.2 MMOL/L (ref 3.4–5.3)
PROT SERPL-MCNC: 5.6 G/DL (ref 6.8–8.8)
PROT SERPL-MCNC: 6 G/DL (ref 6.8–8.8)
PROT SERPL-MCNC: 6.2 G/DL (ref 6.8–8.8)
PROT SERPL-MCNC: 6.3 G/DL (ref 6.8–8.8)
R TIME UNTIL CLOT FORMS: 6.3 MINUTE (ref 5–10)
R TIME UNTIL CLOT FORMS: 8.7 MINUTE (ref 5–10)
RBC # BLD AUTO: 3.28 10E12/L (ref 4.4–5.9)
RBC # BLD AUTO: 3.54 10E12/L (ref 4.4–5.9)
RBC # BLD AUTO: 3.79 10E12/L (ref 4.4–5.9)
RBC # BLD AUTO: 3.8 10E12/L (ref 4.4–5.9)
SODIUM BLD-SCNC: 138 MMOL/L (ref 133–144)
SODIUM SERPL-SCNC: 143 MMOL/L (ref 133–144)
SODIUM SERPL-SCNC: 144 MMOL/L (ref 133–144)
SODIUM SERPL-SCNC: 145 MMOL/L (ref 133–144)
SODIUM SERPL-SCNC: 145 MMOL/L (ref 133–144)
TRANSFUSION STATUS PATIENT QL: NORMAL
TRANSFUSION STATUS PATIENT QL: NORMAL
TROPONIN I SERPL-MCNC: 151.85 UG/L (ref 0–0.04)
TROPONIN I SERPL-MCNC: 172.32 UG/L (ref 0–0.04)
TROPONIN I SERPL-MCNC: >200 UG/L (ref 0–0.04)
TROPONIN I SERPL-MCNC: >200 UG/L (ref 0–0.04)
WBC # BLD AUTO: 10 10E9/L (ref 4–11)
WBC # BLD AUTO: 12.3 10E9/L (ref 4–11)
WBC # BLD AUTO: 15.5 10E9/L (ref 4–11)
WBC # BLD AUTO: 15.8 10E9/L (ref 4–11)

## 2020-05-18 PROCEDURE — G0463 HOSPITAL OUTPT CLINIC VISIT: HCPCS

## 2020-05-18 PROCEDURE — 33949 ECMO/ECLS DAILY MGMT ARTERY: CPT | Performed by: INTERNAL MEDICINE

## 2020-05-18 PROCEDURE — 25000128 H RX IP 250 OP 636: Performed by: INTERNAL MEDICINE

## 2020-05-18 PROCEDURE — 33949 ECMO/ECLS DAILY MGMT ARTERY: CPT

## 2020-05-18 PROCEDURE — 82947 ASSAY GLUCOSE BLOOD QUANT: CPT | Performed by: INTERNAL MEDICINE

## 2020-05-18 PROCEDURE — 84100 ASSAY OF PHOSPHORUS: CPT

## 2020-05-18 PROCEDURE — 84484 ASSAY OF TROPONIN QUANT: CPT

## 2020-05-18 PROCEDURE — 82805 BLOOD GASES W/O2 SATURATION: CPT | Performed by: INTERNAL MEDICINE

## 2020-05-18 PROCEDURE — 83051 HEMOGLOBIN PLASMA: CPT

## 2020-05-18 PROCEDURE — 25800030 ZZH RX IP 258 OP 636: Performed by: INTERNAL MEDICINE

## 2020-05-18 PROCEDURE — 00000146 ZZHCL STATISTIC GLUCOSE BY METER IP

## 2020-05-18 PROCEDURE — 40000275 ZZH STATISTIC RCP TIME EA 10 MIN

## 2020-05-18 PROCEDURE — 85610 PROTHROMBIN TIME: CPT

## 2020-05-18 PROCEDURE — 25000125 ZZHC RX 250: Performed by: INTERNAL MEDICINE

## 2020-05-18 PROCEDURE — 86316 IMMUNOASSAY TUMOR OTHER: CPT | Performed by: INTERNAL MEDICINE

## 2020-05-18 PROCEDURE — 99221 1ST HOSP IP/OBS SF/LOW 40: CPT | Performed by: NURSE PRACTITIONER

## 2020-05-18 PROCEDURE — 93005 ELECTROCARDIOGRAM TRACING: CPT

## 2020-05-18 PROCEDURE — 85347 COAGULATION TIME ACTIVATED: CPT

## 2020-05-18 PROCEDURE — 80053 COMPREHEN METABOLIC PANEL: CPT

## 2020-05-18 PROCEDURE — 86140 C-REACTIVE PROTEIN: CPT

## 2020-05-18 PROCEDURE — P9016 RBC LEUKOCYTES REDUCED: HCPCS | Performed by: INTERNAL MEDICINE

## 2020-05-18 PROCEDURE — 25800030 ZZH RX IP 258 OP 636

## 2020-05-18 PROCEDURE — 85396 CLOTTING ASSAY WHOLE BLOOD: CPT | Performed by: INTERNAL MEDICINE

## 2020-05-18 PROCEDURE — 85300 ANTITHROMBIN III ACTIVITY: CPT

## 2020-05-18 PROCEDURE — 95715 VEEG EA 12-26HR INTMT MNTR: CPT | Mod: ZF

## 2020-05-18 PROCEDURE — 40000014 ZZH STATISTIC ARTERIAL MONITORING DAILY

## 2020-05-18 PROCEDURE — 40000076 ZZH STATISTIC IABP MONITORING

## 2020-05-18 PROCEDURE — 82805 BLOOD GASES W/O2 SATURATION: CPT

## 2020-05-18 PROCEDURE — 94003 VENT MGMT INPAT SUBQ DAY: CPT

## 2020-05-18 PROCEDURE — 85652 RBC SED RATE AUTOMATED: CPT

## 2020-05-18 PROCEDURE — 25000128 H RX IP 250 OP 636

## 2020-05-18 PROCEDURE — 83735 ASSAY OF MAGNESIUM: CPT

## 2020-05-18 PROCEDURE — 85730 THROMBOPLASTIN TIME PARTIAL: CPT

## 2020-05-18 PROCEDURE — 87040 BLOOD CULTURE FOR BACTERIA: CPT

## 2020-05-18 PROCEDURE — 83605 ASSAY OF LACTIC ACID: CPT | Performed by: INTERNAL MEDICINE

## 2020-05-18 PROCEDURE — 99291 CRITICAL CARE FIRST HOUR: CPT | Mod: 25 | Performed by: INTERNAL MEDICINE

## 2020-05-18 PROCEDURE — 93010 ELECTROCARDIOGRAM REPORT: CPT | Mod: 59 | Performed by: INTERNAL MEDICINE

## 2020-05-18 PROCEDURE — 85520 HEPARIN ASSAY: CPT

## 2020-05-18 PROCEDURE — 99207 ZZC CDG-MDM COMPONENT: MEETS LOW - DOWN CODED: CPT | Performed by: NURSE PRACTITIONER

## 2020-05-18 PROCEDURE — 27211402 ZZH SENSOR NIRS OXIMETER, ADULT

## 2020-05-18 PROCEDURE — 25000125 ZZHC RX 250

## 2020-05-18 PROCEDURE — 93306 TTE W/DOPPLER COMPLETE: CPT | Mod: 26 | Performed by: INTERNAL MEDICINE

## 2020-05-18 PROCEDURE — C9113 INJ PANTOPRAZOLE SODIUM, VIA: HCPCS | Performed by: INTERNAL MEDICINE

## 2020-05-18 PROCEDURE — 86316 IMMUNOASSAY TUMOR OTHER: CPT

## 2020-05-18 PROCEDURE — 93306 TTE W/DOPPLER COMPLETE: CPT

## 2020-05-18 PROCEDURE — 71045 X-RAY EXAM CHEST 1 VIEW: CPT

## 2020-05-18 PROCEDURE — 85379 FIBRIN DEGRADATION QUANT: CPT

## 2020-05-18 PROCEDURE — 20000004 ZZH R&B ICU UMMC

## 2020-05-18 PROCEDURE — 85027 COMPLETE CBC AUTOMATED: CPT

## 2020-05-18 PROCEDURE — 25000132 ZZH RX MED GY IP 250 OP 250 PS 637: Performed by: STUDENT IN AN ORGANIZED HEALTH CARE EDUCATION/TRAINING PROGRAM

## 2020-05-18 PROCEDURE — 25000132 ZZH RX MED GY IP 250 OP 250 PS 637: Performed by: INTERNAL MEDICINE

## 2020-05-18 PROCEDURE — 82330 ASSAY OF CALCIUM: CPT | Performed by: INTERNAL MEDICINE

## 2020-05-18 PROCEDURE — 83615 LACTATE (LD) (LDH) ENZYME: CPT

## 2020-05-18 PROCEDURE — 85384 FIBRINOGEN ACTIVITY: CPT

## 2020-05-18 RX ADMIN — Medication 1 G: at 23:53

## 2020-05-18 RX ADMIN — PIPERACILLIN AND TAZOBACTAM 3.38 G: 3; .375 INJECTION, POWDER, FOR SOLUTION INTRAVENOUS at 06:06

## 2020-05-18 RX ADMIN — DEXTROSE MONOHYDRATE 4 MCG/KG/MIN: 50 INJECTION, SOLUTION INTRAVENOUS at 10:23

## 2020-05-18 RX ADMIN — VANCOMYCIN HYDROCHLORIDE 1750 MG: 10 INJECTION, POWDER, LYOPHILIZED, FOR SOLUTION INTRAVENOUS at 17:50

## 2020-05-18 RX ADMIN — ASPIRIN 81 MG CHEWABLE TABLET 81 MG: 81 TABLET CHEWABLE at 07:36

## 2020-05-18 RX ADMIN — PIPERACILLIN AND TAZOBACTAM 3.38 G: 3; .375 INJECTION, POWDER, FOR SOLUTION INTRAVENOUS at 00:30

## 2020-05-18 RX ADMIN — Medication: at 08:39

## 2020-05-18 RX ADMIN — PIPERACILLIN AND TAZOBACTAM 3.38 G: 3; .375 INJECTION, POWDER, FOR SOLUTION INTRAVENOUS at 17:47

## 2020-05-18 RX ADMIN — TICAGRELOR 90 MG: 90 TABLET ORAL at 07:36

## 2020-05-18 RX ADMIN — SODIUM THIOSULFATE 0.25 MCG/KG/MIN: 250 INJECTION, SOLUTION INTRAVENOUS at 01:20

## 2020-05-18 RX ADMIN — Medication: at 01:19

## 2020-05-18 RX ADMIN — PANTOPRAZOLE SODIUM 40 MG: 40 INJECTION, POWDER, FOR SOLUTION INTRAVENOUS at 20:16

## 2020-05-18 RX ADMIN — HEPARIN SODIUM 1000 UNITS/HR: 10000 INJECTION, SOLUTION INTRAVENOUS at 20:26

## 2020-05-18 RX ADMIN — MIDAZOLAM (PF) 1 MG/ML IN 0.9 % SODIUM CHLORIDE INTRAVENOUS SOLUTION 4 MG/HR: at 02:51

## 2020-05-18 RX ADMIN — PIPERACILLIN AND TAZOBACTAM 3.38 G: 3; .375 INJECTION, POWDER, FOR SOLUTION INTRAVENOUS at 13:20

## 2020-05-18 RX ADMIN — DEXTROSE MONOHYDRATE 4 MCG/KG/MIN: 50 INJECTION, SOLUTION INTRAVENOUS at 00:56

## 2020-05-18 RX ADMIN — Medication 100 MCG/HR: at 16:59

## 2020-05-18 RX ADMIN — PROPOFOL 40 MCG/KG/MIN: 10 INJECTION, EMULSION INTRAVENOUS at 15:38

## 2020-05-18 RX ADMIN — TICAGRELOR 90 MG: 90 TABLET ORAL at 20:10

## 2020-05-18 RX ADMIN — PROPOFOL 20 MCG/KG/MIN: 10 INJECTION, EMULSION INTRAVENOUS at 10:35

## 2020-05-18 RX ADMIN — DEXTROSE MONOHYDRATE 4 MCG/KG/MIN: 50 INJECTION, SOLUTION INTRAVENOUS at 17:47

## 2020-05-18 RX ADMIN — Medication: at 15:38

## 2020-05-18 RX ADMIN — MIDAZOLAM 3 MG: 1 INJECTION INTRAMUSCULAR; INTRAVENOUS at 11:10

## 2020-05-18 RX ADMIN — PROPOFOL 40 MCG/KG/MIN: 10 INJECTION, EMULSION INTRAVENOUS at 20:03

## 2020-05-18 RX ADMIN — PROPOFOL 40 MCG/KG/MIN: 10 INJECTION, EMULSION INTRAVENOUS at 23:27

## 2020-05-18 RX ADMIN — PANTOPRAZOLE SODIUM 40 MG: 40 INJECTION, POWDER, FOR SOLUTION INTRAVENOUS at 08:39

## 2020-05-18 RX ADMIN — MAGNESIUM SULFATE HEPTAHYDRATE 2 G: 40 INJECTION, SOLUTION INTRAVENOUS at 18:57

## 2020-05-18 ASSESSMENT — ACTIVITIES OF DAILY LIVING (ADL)
ADLS_ACUITY_SCORE: 22

## 2020-05-18 ASSESSMENT — MIFFLIN-ST. JEOR: SCORE: 1903.75

## 2020-05-18 NOTE — PROGRESS NOTES
Cardiology Critical Care Note - Cardiology  Jefferson Alanis M.D.  Critical Care Diagnoses:  Cardiogenic shock  Acute hypoxic respiratory failure  Pneumonia/ Septic shock  Acute renal failure  Rib fractures  Acute liver failure  Acute myocardial infarction of the LAD      Critical Care management intervention:  Continue nipride at 0.25mcg  Sicatracurium at 4.  Fentanyl of 100   Discontinue Versed   And started propofol    Spent 40 minutes on critical care management on this patient.    .      Professor Zeke FUNES  Interventional Cariology and Cardiac Critical Care      ECMO Attending Progress Note  5/18/2020    Scot Bishop is a 39 year old male who was cannulated for ECMO for due to anterior STEMI followed by asystolic arrest - PEA - VF arrest/.    Interval events: rewarming today .  Improving     Physical Exam:  Temp:  [91.9  F (33.3  C)-93.7  F (34.3  C)] 93.2  F (34  C)  Heart Rate:  [] 92  Resp:  [15-30] 15  MAP:  [57 mmHg-120 mmHg] 84 mmHg  Arterial Line BP: ()/(33-74) 110/52  FiO2 (%):  [50 %] 50 %  SpO2:  [90 %-100 %] 100 %    Intake/Output Summary (Last 24 hours) at 5/18/2020 1134  Last data filed at 5/18/2020 1100  Gross per 24 hour   Intake 3489.38 ml   Output 6300 ml   Net -2810.62 ml    Ventilation Mode: CMV/AC  (Continuous Mandatory Ventilation/ Assist Control)  FiO2 (%): 50 %  Rate Set (breaths/minute): 15 breaths/min  Tidal Volume Set (mL): 400 mL  PEEP (cm H2O): 8 cmH2O  Oxygen Concentration (%): 50 %  Resp: 15     General: no acute distress, intubated and sedated  HEENT: PERRLA, conjunctiva clear, without icterus or pallor, oropharyx clear  Cardiac: RRR nl S1S2   Lungs: crackles bilaterally anterior  Abdomen: soft, nontender, non distended, no hepatosplenomegaly or masses  Extremities: without edema or cyanosis; pulses are palpable ;   Skin: normal skin appearance without worrisome lesions.   Neurologic:intubated and sedated.    Labs:  Recent Labs   Lab 05/18/20  0954  20  0806 20  0542 20  0353   PH 7.33* 7.34* 7.35 7.33*   PCO2 44 40 38 39   PO2 85 83 84 68*   HCO3 23 22 21 21   O2PER 50 50 50 50      Recent Labs   Lab 20  0955 20  0353 20  2205 20  1615   WBC 15.5* 15.8* 17.2* 23.9*   HGB 11.5* 11.4* 12.3* 12.0*     Creatinine   Date Value Ref Range Status   2020 1.04 0.66 - 1.25 mg/dL Final   2020 1.20 0.66 - 1.25 mg/dL Final   2020 1.21 0.66 - 1.25 mg/dL Final   2020 1.24 0.66 - 1.25 mg/dL Final       Arterial Cannula Spanish: 17  Venous Cannula Location: RFV  Blood Flow (Circuit) LPM: 3.75 LPM  Gas Flow  LPM: 2 LPM  Gas FiO2   %: 100 %  ACT  (seconds): 171 seconds  Blood Temp  (degrees C): 34.1 C  Pulse Oximetry  (SpO2%): 100 %  Arterial Pressure  mmH mmHg      ECMO Issues including assessments and plan on DOS 2020:  Neuro: Sedated for mechanical ventilation and ECMO.  NIRS stable  b/l  RASS goal: -4  CV: Cardiogenic shock.  Hemodynamically stable on nipride  Pulm: ECMO and Vent settings as above  FEN/Renal: Electrolytes stable w/ replacement protocols in place, Cr stable, UOP stable  Heme: ACT goal: 160-180, Hemoglobin stable .  Goals: if O2 sat >85% Hgb >8.  If O2 Sat <85% keep Hgb 10-12.  Minimal oozing around the ECMO cannulas.  ID: Receiving empiric antibiotics  Cannulae: Position is acceptable on exam and the available imaging.  Distal perfusion cannula is in place and patent.     I have personally reviewed the ECMO flows, oxygenation and CO2 clearance, anticoagulation, and cannula position.  I have also personally assessed the patient's systemic response with hemodynamics, oxygenation, ventilation, and bleeding.       The patient requires continued ECMO support and management in the ICU.  I have discussed patient care and treatment plan with the primary team.      I have seen and examined the patient with the CSI team.I have reviewed pertinent labs.     Jefferson Alanis MD  Interventional  Cardiology  Pager: [phone]      May 18, 2020

## 2020-05-18 NOTE — PROGRESS NOTES
ECMO Shift Summary:  [id number]      ECMO Equipment:  Console serial number: [id number]  Circuit Lot number: [id number]  Oxygenator Lot number: [id number]      Patient remains on VA ECMO, all equipment is functioning and alarms are appropriately set. RPM's 3500 with flow range 3.74-3.84 L/min. Sweep gas is at 2 LPM and FiO2 100%. Circuit remains free of air, clot and fibrin. Cannulas are secure with no new bleeding from site. Extremities are cool.    Significant Shift Events: none    Vent settings:  Ventilation Mode: CMV/AC  (Continuous Mandatory Ventilation/ Assist Control)  FiO2 (%): 50 %  Rate Set (breaths/minute): 15 breaths/min  Tidal Volume Set (mL): 400 mL  PEEP (cm H2O): 8 cmH2O  Oxygen Concentration (%): 50 %  Resp: 15  .    Heparin is running at 1000 u/hr, ACT range 170s.    Urine output is per RN charting, blood loss was slight from mouth and IV sites. Product given included none.      Intake/Output Summary (Last 24 hours) at 5/18/2020 1402  Last data filed at 5/18/2020 1400  Gross per 24 hour   Intake 3598.77 ml   Output 6370 ml   Net -2771.23 ml       ECHO:  No results found for this or any previous visit.No results found for this or any previous visit.    CXR:  Recent Results (from the past 24 hour(s))   Cardiac Catheterization    Narrative      Acute anterolateral myocardial infarction, with out of hospital cardiac   arrest, ROSC, and recurrent arrest on arrival to cath lab    Single vessel CAD with thrombotic occlusion of proximal LAD    Successful aspiration thrombectomy of the proximal LAD    Successful stenting of proximal-mid LAD with NAZIA (3.0x20 Synergy),   post-dilation to 4.0 mm guided by IVUS.    Successful POBA (with final kissing balloon) of ostial D1    Cardiac resuscitation with inotropic therapy    CPR    Successful placement of periperhal V-A ECMO with Cardiohelp (RFA, RFV)    Successful placement of Zoll Quattro cooling catheter for Thermoguard   hypothermia    Successful placement of RT  radial arterial line    IVUS was performed on the proximal/mid LAD post stenting    Successful insertion of distal antegrade Flex sheath into RT SFA      CT Head w/o Contrast    Narrative    CT HEAD W/O CONTRAST 5/17/2020 3:57 PM    Provided History: Altered level of consciousness (LOC), unexplained    Comparison: None.    Technique: Using multidetector thin collimation helical acquisition  technique, axial, coronal and sagittal CT images from the skull base  to the vertex were obtained without intravenous contrast.     Findings:  Images are moderately limited due to venous contrast  contamination due to prior contrast administration.  No intracranial mass effect, or midline shift. The ventricles are  proportionate to the cerebral sulci. The gray to white matter  differentiation of the cerebral hemispheres is preserved. The basal  cisterns are patent.    Scattered mucosal thickening within the paranasal sinuses and complete  opacification of the right nasal cavity is of uncertain clinical  significance in this patient with intubation. The mastoid air cells  are clear.       Impression    Impression: No acute intracranial pathology.    I have personally reviewed the examination and initial interpretation  and I agree with the findings.    SACHA SOTELO MD   CT Chest Abdomen Pelvis w/o Contrast    Narrative    EXAMINATION: CT CHEST ABDOMEN PELVIS W/O CONTRAST, 5/17/2020 4:02 PM    TECHNIQUE:  Helical CT images from the lung bases through the  symphysis pubis were obtained without IV contrast.     COMPARISON: None    HISTORY: Status post cardiac arrest    FINDINGS:  LINES and TUBES: Gastric tube tip is coiled within the stomach. Right  common femoral vein ECMO cannula tip terminates in the superior  cavoatrial junction. Right common femoral arterial cannula tip  terminates in the junction of the right aortoiliac bifurcation. Left  common femoral venous catheter tip terminates in the proximal IVC.  Left common femoral  artery approach balloon pump inferior metallic  marker in the infrarenal abdominal aorta just distal to the renal  artery origins and the proximal metallic marker is in the proximal  descending thoracic aorta.    CHEST:  LUNGS: Endotracheal tube tip terminates in the mid thoracic trachea  the central tracheobronchial tree is patent and. Debris opacifying of  the right lower lobar pulmonary bronchi. Dense bilateral dependent  pulmonary consolidations with air bronchograms. Mild hazy groundglass  opacities seen anteriorly to the dependent consolidations. No large  pulmonary mass. No interlobular septal thickening. No pneumothorax or  pleural effusion.    MEDIASTINUM: Unremarkable thyroid the ascending aorta and main  pulmonary artery are normal in caliber. Severe atherosclerotic  calcifications of the left anterior descending coronary artery. No  pericardial effusion. No anterior mediastinal hematoma. No enlarged  thoracic lymph nodes.     ABDOMEN/PELVIS:  LIVER: Unremarkable noncontrast appearance of the liver.  GALLBLADDER: Within normal limits.  PANCREAS: 3 mm round hypodensity within the pancreatic tail likely  representing pancreatic cyst or IPMN. No suspicious pancreatic mass.  No pancreatic ductal dilation.  SPLEEN: Within normal limits.  ADRENAL GLANDS: Within normal limits.  URINARY TRACT: Kidneys are normal in size and position without  suspicious renal mass. Contrast opacification of the renal calyces and  pelvis secondary to recent coronary angiogram. Contrast opacification  of the ureters bilaterally without focal defects. The urinary bladder  is unremarkable with Silva catheter in place and balloon inflated.  REPRODUCTIVE ORGANS: Unremarkable appearance of the prostate and  seminal vesicles  BOWEL: Dilated fluid-filled loops of small bowel measuring up to 3.0  cm.  No focal transition point. The large bowel is normal in caliber.  No significant inflammatory change about the loops of bowel.     PERITONEUM/FLUID: No free fluid or air in the abdomen or pelvis.    VESSELS: No aneurysmal dilation of the infrarenal abdominal aorta.  Vascular patency cannot be assessed.    LYMPH NODES: No lymphadenopathy in the abdomen or pelvis.    BONES/SOFT TISSUES: Mild compression type deformities of T7-T9. No  suspicious osseous lesions.. Soft tissue fat stranding in the right  groin secondary to line placement.      Impression    IMPRESSION:   1. Dependent bilateral pleural effusions with associated  atelectasis/consolidation, possible aspiration in the setting of  recent cardiac arrest.  2. Borderline dilated fluid-filled loops of small bowel measuring up  to 3.0 cm, likely result of hypotension.   3. Support devices are in appropriate position.  4. 3 mm round hypodensity in the pancreatic tail favor to represent  cyst or IPMN. Consider nonemergent follow-up MR.  5. Diffuse hepatic steatosis.    I have personally reviewed the examination and initial interpretation  and I agree with the findings.    CRISTINA MATTSON MD   XR Chest Port 1 View    Narrative    EXAMINATION:  XR Chest one vw 5/17/2020 4:47 PM.    COMPARISON: Same-day CT at 1549 hours.    HISTORY:  Endotracheal tube placement. ECMO cannula placement    FINDINGS: Portable view of the chest. Endotracheal tube tip projects  over the proximal/mid trachea. Esophageal temperature probe projects  over the proximal esophagus. Gastric tube tip and sidehole project  over the gastric body. Inferior approach ECMO cannula projects over  the right atrium. Intra-aortic balloon pump metallic marker projects  over the proximal descending thoracic aorta at the level of the  chaim. Pacer pad projects over the left hemithorax. Linear metallic  densities project over the lateral right hemithorax, favored to BE  external to patient as these were not demonstrated on comparison CT  and are atypical in appearance.    Midline trachea. Heart is within normal limits. Pulmonary  vasculature  is distinct. Minimally increased haziness throughout bilateral lung  fields compatible with known dependent consolidations. No pleural  effusion or pneumothorax. Upper abdomen is unremarkable.      Impression    IMPRESSION:   1. Endotracheal tube tip projects over the proximal/mid trachea.  2. Inferior approach ECMO cannula tip projects over the high right  atrium.  3. Intra-aortic balloon pump metallic marker projects over the  proximal descending thoracic aorta in appropriate position.  4. Bilateral hazy pulmonary opacities compatible with bilateral  dependent atelectasis as seen on same-day CT.    I have personally reviewed the examination and initial interpretation  and I agree with the findings.    CRISTINA MATTSON MD   US Carotid Bilateral    Narrative    Exam: Bilateral carotid duplex Doppler ultrasound dated 5/17/2020 9:23  PM    Clinical history: Cardiac Arrest on ECMO    Comparison Study: None    Technique: Grayscale (B-mode) and duplex and spectral Doppler  ultrasound of the extracranial internal carotid, external carotid,  vertebral artery origins, right brachiocephalic/subclavian and left  subclavian arteries. Velocity measurements obtained with angle  correction at or less than 60 degrees.    Findings:    Right side:     Plaque Morphology: Predominantly echogenic, Smooth    Mid CCA: 57 cm/sec     External CA: 59 cm/sec       Proximal ICA: 38 cm/sec     Mid ICA: 32 cm/sec     Distal ICA: 44 cm/sec       Vertebral artery: Antegrade  ICA/CCA ratio: 0.56    Left side:     Plaque Morphology: Predominantly echogenic, Irregular       Mid CCA: 60 cm/sec     External CA: 44 cm/sec       Proximal ICA: 30 cm/sec     Mid ICA: 47 cm/sec     Distal ICA: 60 cm/sec       Vertebral artery: Antegrade  ICA/CCA ratio: 0.78      Impression    Impression:    1. Right side:        Degree of stenosis of the internal carotid artery: Less than 50 %.      2. Left side:         Degree of stenosis of the internal  carotid artery: Less than 50 %.    Consensus Panel Gray-Scale and Doppler US Criteria for Diagnosis of  ICA Stenosis (Radiology 11/2003) additionally modified by Ervin et  al. in Journal of Vascular Surgery 1/2011, [phone].       Normal         ICA PSV < 140 cm/sec       Plaque Estimate None       ICA/CCA  PSV Ratio < 2.0       ICA EDV < 40 cm/sec       < 50%          ICA PSV < 140 cm/sec       Plaque Estimate < 50%       ICA/CCA  PSV Ratio < 2.0       ICA EDV < 40 cm/sec       50- 69%       ICA -230 cm/sec       Plaque Estimate > or = 50%       ICA/CCA PSV Ratio 2.0-4.0       ICA EDV  cm/sec         > or = 70%, less than near occlusion       ICA PSV > 230 cm/sec       Plaque Estimate > or = 50%       ICA/CCA Ratio > 4.0       ICA EDV > 100 cm/sec                                            Additional criteria from vascular surgery     > 80%       EDV > 120 cm/sec     I have personally reviewed the examination and initial interpretation  and I agree with the findings.    YUMI OLEARY MD   US Lower Extremity Arterial Duplex Bilateral    Narrative    Exam: Duplex ultrasound of bilateral lower extremity arteries dated  5/17/2020 9:23 PM     Clinical information: Baseline to assess blood flow to Lower  Extremities (VA ECMO)     Comparison: None    Technique: Grayscale (B-mode), color Doppler, and duplex spectral  Doppler ultrasound of the lower extremity arteries. Velocity  measurements obtained with angle correction of 60 degrees or less.    Findings:     Right lower extremity:     Common femoral artery: ECMO present  Profunda femoral artery: Obscured  Proximal SFA: Velocity: 34 cm/sec. Waveforms: Monophasic  Mid SFA:Velocity: Obscured  Distal SFA: Velocity: Obscured  Popliteal artery: Velocity: 33 cm/sec. Waveforms: Monophasic    PTA ankle: Velocity: 20 cm/sec. Waveforms: Monophasic  CATRACHITA ankle at mid calf: Velocity: 16 cm/sec. Waveforms: Monophasic    Left lower extremity:    Common femoral artery:  Bandage covered  Deep femoral artery: Obscured  Proximal SFA: Obscured  Mid SFA: Velocity: 97 cm/sec. Waveforms: Biphasic  Distal SFA: Velocity: 84 cm/sec. Waveforms: Biphasic    Popliteal artery, proximal: Velocity: 50 cm/sec. Waveforms: Biphasic      PTA ankle: Velocity: 38 cm/sec. Waveforms: Biphasic  CATRACHITA ankle: Velocity: 26 cm/sec. Waveforms: Biphasic      Impression    Impression:     1. Right leg: Limited evaluation due to multifocal bandage coverage  and obscured vessels.  The visualized proximal superficial femoral,  popliteal, posterior tibia and anterior tibia arteries are patent  without evidence of hemodynamically significant stenosis.    2. Left leg: Limited evaluation due to multifocal bandage coverage and  obscured vessels.  The visualized mid and distal superficial femoral,  popliteal, posterior tibia and anterior tibia arteries are patent  without evidence of hemodynamically significant stenosis.    Guidelines:    [facility] duplex criteria for lower limb arterial  occlusive disease    Percent stenosis:     Normal (1-19%): Peak systolic velocity (cm/s): <150, End-diastolic  velocity (cm/s): <40, Velocity ratio (Vr): <1.5, Distal arterial  waveform: Triphasic    20-49%: Peak systolic velocity (cm/s): 150-200, End-diastolic velocity  (cm/s): <40, Velocity ratio (Vr): 1.5-2.0, Distal arterial waveform:  Triphasic    50-75%: Peak systolic velocity (cm/s): 200-300, End-diastolic velocity  (cm/s): <90, Velocity ratio (Vr): 2.0-3.9, Distal arterial waveform:  Poststenotic turbulence distal to stenosis, monophasic distal waveform    >75%: Peak systolic velocity (cm/s): >300, End-diastolic velocity  (cm/s): <90, Velocity ratio (Vr): >4.0, Distal arterial waveform:  Dampened distal waveform and low PSV/EDV* in the stenosis    Occlusion: Absent flow by color Doppler/pulsed Doppler spectral  analysis; length of occlusion estimated from distance between exit and  reentry collateral  arteries    *PSV = peak systolic velocity, EDV = end-diastolic velocity  http://link.judge.com/[email or web address]/fulltext  html    I have personally reviewed the examination and initial interpretation  and I agree with the findings.    YUMI OLEARY MD   XR Chest Port 1 View    Narrative    XR CHEST PORT 1 VW  2020 1:48 AM    History:  Check endotracheal tube placement and ECLS cannula  placement. DO NOT log-roll patient.  Place film under patient using  patient safety handling process..     Comparison: Chest radiograph dated 2020    Findings:   Supine portable AP chest radiograph. Endotracheal tube with the tip  projects 4.5 cm above chaim. Intra-aortic pump metallic clip at the  level of chaim, unchanged. Stable gastric tube and inferior ECMO  catheter. Temperature probe with the tip projects over mid esophagus.    Cardiac silhouette is at upper normal limit. Pulmonary vasculature is  relatively distinct. No significant change in bilateral interstitial  and airspace opacities. Low lung volumes. No pneumothorax or  significant pleural effusion.      Impression    IMPRESSION:  1.  Temperature probe is advanced with the tip projects over mid  esophagus. Otherwise stable supportive lines and tubes.  2.  No significant change in bilateral interstitial and airspace  opacities, which may represent pulmonary edema, atelectasis versus  infection.    I have personally reviewed the examination and initial interpretation  and I agree with the findings.    DARWIN BRUNO MD   Echocardiogram Complete    Narrative    [id number]  [id number]  [id number]  [id number]^GRIS^ASRAMARIA ESTHER           [facility],[place]  Echocardiography Laboratory  [address] [place] [id number]     Name: FAINA FORRESTER  MRN: [id number]  : 1980  Study Date: 2020 08:16 AM  Age: 39 yrs  Gender: Male  Patient Location: [facility]  Reason For Study: Cardiac Arrest  Ordering Physician: GRIS  ASRAR  Performed By: Denisse Johnson RDCS     BSA: 2.1 m2  Height: 66 in  Weight: 231 lb  BP: 132/58 mmHg  _____________________________________________________________________________  __        Procedure  Complete Portable Echo Adult. Technically difficult study.Extremely poor  acoustic windows.  _____________________________________________________________________________  __        Interpretation Summary  VA ECMO at 3.7L/min. Technically difficult study. Extremely poor acoustic  windows.  Severely (EF <30%) reduced left ventricular function on extremely limited  views.  The right ventricle cannot be assessed.  No pericardial effusion is present.  Previous study not available for comparison.  _____________________________________________________________________________  __        Left Ventricle  Left ventricular wall thickness cannot evaluate. Left ventricular size is  normal. Severely (EF <30%) reduced left ventricular function is present. Left  ventricular diastolic function is not assessable. Severe diffuse hypokinesis  is present.     Right Ventricle  The right ventricle cannot be assessed. Right ventricular function cannot be  assessed due to poor image quality.     Mitral Valve  The mitral valve cannot be assessed.        Aortic Valve  The aortic valve opens with each cardiac cycle. On Doppler interrogation,  there is no significant stenosis or regurgitation.     Tricuspid Valve  The tricuspid valve cannot be assessed. Pulmonary artery systolic pressure  cannot be assessed.     Pulmonic Valve  The pulmonic valve cannot be assessed.     Vessels  The aorta root cannot be assessed. The thoracic aorta cannot be assessed.  Venous ECMO cannula is visualized traversing the IVC.     Pericardium  No pericardial effusion is present.     Compared to Previous Study  Previous study not available for comparison.     _____________________________________________________________________________  __                        Report approved by: Ashlee Izquierdo 05/18/2020 09:18 AM                 _____________________________________________________________________________  __          Labs:  Recent Labs   Lab 05/18/20  1204 05/18/20  0954 05/18/20  0806 05/18/20  0542   PH 7.37 7.33* 7.34* 7.35   PCO2 41 44 40 38   PO2 209* 85 83 84   HCO3 23 23 22 21   O2PER 50 50 50 50       Lab Results   Component Value Date    HGB 11.5 (L) 05/18/2020    PHGB 40 (H) 05/18/2020     05/18/2020    FIBR 239 05/18/2020    INR 1.24 (H) 05/18/2020    PTT 92 (H) 05/18/2020    DD >20.0 (H) 05/18/2020    AXA 0.33 05/18/2020    ANTCH 73 (L) 05/18/2020         Plan is to remain stable on VA ECMO.      Peter Shane, RT  ECMO Specialist  5/18/2020 2:02 PM

## 2020-05-18 NOTE — PROGRESS NOTES
"CLINICAL NUTRITION SERVICES - ASSESSMENT NOTE     Nutrition Prescription    Malnutrition Status:    Unable to determine due to lack of nutrition and weight history available.     Interventions ordered by Registered Dietitian (RD):  - Start trophic feeds via OGT with Impact peptide @ 15 ml/hr   - FWF of 30 ml q4h for tube patency   - Certavite daily     Future Recommendations:  - Pending lactate trends, advance feeds as tolerated. Goal TF regimen: Impact Peptide @ 55 ml/hr to provide 1980 kcals (26 kcal/kg/day), 124 g PRO (1.7 g/kg/day), 1016 ml free H2O, 84 g Fat (50% from MCTs), 185 g CHO and no Fiber daily.       REASON FOR ASSESSMENT  Scot Bishop is a 39 year old male assessed by the dietitian for Provider Order - Registered Dietitian to Assess and Order TF per Medical Nutrition Therapy Protocol    NUTRITION HISTORY  Unable to obtain, pt intubated/sedated.     CURRENT NUTRITION ORDERS  Diet: NPO x 1 day     LABS  Labs reviewed  Lactic acid 4.8 --> 3.7     MEDICATIONS  Medications reviewed  Propofol     ANTHROPOMETRICS  Height: 167.6 cm (5' 6\")  Most Recent Weight: 105.1 kg (231 lb 11.3 oz)    IBW: 64.5 kg  BMI: Obesity Grade II BMI 35-39.9  Weight History:   Wt Readings from Last 10 Encounters:   05/18/20 105.1 kg (231 lb 11.3 oz)       Dosing Weight: 75 kg (adjusted, based on IBW of 64.5 kg and current weight of 105.1 kg on 5/18)     ASSESSED NUTRITION NEEDS  Estimated Energy Needs: 1500 - 1900+ kcals/day (20 - 25+ kcals/kg)  Justification: Maintenance, obese   Estimated Protein Needs: 110 - 150 grams protein/day (1.5 - 2 grams of pro/kg)  Justification: Increased needs, obese   Estimated Fluid Needs: 1500 - 1900 mL/day (1 mL/kcal)   Justification: Maintenance, pending fluid status     PHYSICAL FINDINGS  See malnutrition section below.    MALNUTRITION  % Intake: Unable to assess  % Weight Loss: Unable to assess  Subcutaneous Fat Loss: None observed based on visual assessment only.   Muscle Loss: None " observed based on visual assessment only.  Fluid Accumulation/Edema: None noted  Malnutrition Diagnosis: Unable to determine due to lack of nutrition and weight history available.     NUTRITION DIAGNOSIS  Inadequate protein-energy intake related to NPO without nutrition support as evidenced by inadequate intake x 1 day.       INTERVENTIONS  Implementation  Collaboration with other providers - Team anticipate pt will be decannulated in the near future. Will hold off placing post-pyloric feeding tube until after decannulation (given tube may be removed during procedure/PIPE in OR). Plan to start trophic feeds via OGT for now.     Goals  Total avg nutritional intake to meet a minimum of 25 kcal/kg and 1.5 g PRO/kg daily (per dosing wt 75 kg).     Monitoring/Evaluation  Progress toward goals will be monitored and evaluated per protocol.    Rosemary Reynoso, PATRICIA, LD  [id number]  Pgr: [phone]

## 2020-05-18 NOTE — PROGRESS NOTES
Preliminary Video EEG impression on May 17-18, 2020  Until 6am     Full report to follow. Please look in inpatient chart, under procedure section.     Patient had a cardiac arrest, undergoing hypothermia treatment, on sedative drips. EEG is abnormal due to the presences of diffuse attenuated EEG with rare burst of low voltage theta/delta activity. There is no  parieto-occipital rhythm or well formed sleep architecture. EEG is not reactive with stimulation.     This EEG is consistent with a profound encephalopathy in the setting of hypothermia and anesthetic drip medications. No epileptiform discharges or electrographic seizures were seen in this record. If events of interest are noted, please press the event button. Clinical correlation is advised.     Antonella Franklin MD  Staff Epilepsy Neurologist    [phone]

## 2020-05-18 NOTE — PROGRESS NOTES
[facility], [facility] Nurse Inpatient Adult Pressure Injury Prevention Assessment: ECMO  Initial     Positioning Tolerance: Fair  Date of ECMO cannulation: 5/17/2020  Presence of Ischemia: No  Location of ischemia:     Pressure Injury Prevention Interventions In Place:  Optifoam Dressing under ECMO Cannula, Z flow Positioner under head, Pillows for repositioning, TAPs Wedge Positioners in use, Heel off-loading boots, Pillows under calves for heel suspension and Mepilex Sacral Dressing   Current support surface: Standard  Low air loss mattress       Pressure Injury Prevention Interventions Added:  None        Plan of Care for Positioning and Pressure Injury Prevention  Reposition patient every 1-2 hours using TAP Wedges  Position head on Z flow positioner, mold indentation at areas of pressure points.  Pad ECMO IJ cannula with Optifoam (#[id number]) along face and scalp between skin and cannula and under Coban head wraps    Pad ECMO groin and chest cannula under rigid connectors with Optifoam or Soft cloth  Heel off-loading Boots at all times  Sacral Mepilex for Prevention, change every 5 days and prn  Low Air loss mattress    Patient History:   According to medical record: 39 year old male who was admitted on 5/17/2020.  Patient apparently reported chest pain that started yesterday.  Of note, he was working on his pipes at home and using Drano.  Because the Drano stated on the box that it could cause shortness of breath or chest pain, he did not seek immediate medical attention for his 10/10 substernal chest pain.  Patient's significant other convinced him to go to the hospital, and he took a shower.  After coming out of the shower, he apparently had a syncopal episode.  Initial rhythm on arrival of EMS was asystole.  With chest compressions patient eventually had PEA and then eventually had VF in the field.  He received multiple cycles of epinephrine and had ROSC.  He was also intubated  in the field.    Current Diet / Nutrition:     Orders Placed This Encounter      NPO for Medical/Clinical Reasons Except for: No Exceptions      Output:    I/O last 3 completed shifts:  In: 3207.6 [I.V.:1657.6; NG/GT:50; IV Piggyback:1000]  Out: 5120 [Urine:4320; Emesis/NG output:800]  Containment: of urine/stool: Incontinence Protocol, Rectal Tube and Urinary Catheter    Risk Assessment:   Sensory Perception: 1-->completely limited  Moisture: 4-->rarely moist  Activity: 1-->bedfast  Mobility: 1-->completely immobile  Nutrition: 1-->very poor  Friction and Shear: 2-->potential problem  Burak Score: 10    Labs:    Recent Labs   Lab 05/18/20  0353   ALBUMIN 3.6   HGB 11.4*   INR 1.33*   WBC 15.8*   CRP 16.0*       Focused Assessment:  Right groin ECMO cannula    Pressure Injury Present::No   Tongue: posterior surface laceration and bruise on left side (in picture bruise seen just at corner of lips).  Injury consistent with likely traumatic intubation per history.          Education provided to: nurse  Discussed importance of:their role in pressure injury prevention  Discussed plan of care with Nurse  WOC Nurse follow-up plan:weekly    Ragini Wolf RN CWOCN

## 2020-05-18 NOTE — PROCEDURES
Procedure Date: 2020      EEG #:  -1    Video EEG day 1.      DATE OF RECORDING/SERVICE DATE:  2020.      SOURCE FILE DURATION:  2 hours and 6 minutes.      PATIENT INFORMATION:  This is a 39-year-old male who presented after cardiac arrest.  The patient is currently in hypothermia treatment.  EEG is being done to evaluate for seizures.      MEDICATIONS:  Midazolam 4 mg per hour, fentanyl 100 mcg per hour, cisatracurium 4 mcg/kg per minute.     TECHNICAL SUMMARY: This video EEG monitoring procedure was performed with 23 scalp electrodes in 10-20 system placements, and additional scalp, precordial and other surface electrodes used for electrical referencing and artifact detection. Video was reviewed intermittently by EEG technologist and physician for electroclinical seizures.      EEG FINDINGS:  The patient has a diffusely attenuated EEG with electrocerebral potentials less than 10 microvolts.  There is diffuse EKG artifact throughout the record.  There were segments of delta or theta burst noted in less than 5% of the record.  A well-formed parietooccipital rhythm is not seen.  Sleep architecture is not seen.  EEG is not reactive.      EPILEPTIFORM DISCHARGES:  None.      ICTAL:  None.      IMPRESSION:  Video EEG day 1 is abnormal due to the presence of diffuse EEG attenuation consistent with a profound encephalopathy.  The patient is currently undergoing hypothermia treatment with anesthetic drip medications.  No electrographic seizure, paroxsymal behaviors, or epileptiform discharges were seen.       DB GREGORY MD             D: 2020   T: 2020   MT: MANDY      Name:     FAINA FORRESTER   MRN:      [id number]        Account:        [id number]   :      1980           Procedure Date: 2020      Document: [id number]

## 2020-05-18 NOTE — PROGRESS NOTES
[facility]  Interventional Cardiology Progress Note  May 18, 2020            Assessment and Plan:   40 y/o M with who was admitted s/p cardiac arrest and found to have acute MI with thrombotic occlusion of pLAD s/p NAZIA of pLAD and mLAD and POBA of D1. TTM to 34 C on 5/17.     24 changes:  Lactate trending down   Rewarming at 4 pm  ECHO today   Lasix for pulm edema   Will wean of sedation, paralytics after rewarming      Neurology: Encephalopathy  Intubated, sedated, paralyzed. Cooled to 34 degrees.  --continue versed, fentanyl, and cis as needed to maintain paralysis   - RASS goal -4 to -5   -- CTH without ICH  -- VEEG  -- appreciatte neuro crit recs     Cardiovascular / Hemodynamics: Refractory VF arrest.  NAZIA to proximal LAD.  Peripheral V-A ECMO inserted for cardiac arrest. LA 16.   TTE: pending  EKG: pending.   --wean pressors/inotropes as able  --echo tomorrow with ECMO turndown  --continue ASA 81mg and ticagrelor 90mg BID  --hold temp at 34  MAP goal 65-90 , nipride gtt on   --ACT goal 160-180 cause GIB  --hold lipitor for now given likely hepatic injury during arrest  --hold ACE/ARB for now given likely reduced renal fxn after arrest  --holding beta blocker given shock    Pulmonary: Acute hypoxic resp failure  COVID negative  Vent Settings:  FiO2 = 50%, PEEP 8 , RR 16,    ECMO FiO 2 = 100%, sweep 1   ETT above the chaim.  Now weaning vent requirements.  CXR: Lines in stable position.   --wean vent as able  --daily CXR  Chest CT  --Q2h ABGs for now  --consider scheduled duonebs if signs of lung dz, currently PRN     GI and Nutrition: Shock liver  GIB   --monitor BID LFTs  --NPO while cooled - nutrition consult pending feeding tube placement once he is warmed   --bowel regimen - on hold for now due to hypothermia  --GI Prophylaxis: PPI bid for UGIB   Renal, Fluid and Electrolytes: Acute kidney injury   Cr 1.2, UOP non oliguric   --monitor urine output  --maintain  K>3 and Mg>2    Infectious Disease: Concern for aspiration pneumonia  Leucocytosis -- improving  No signs of infection. Leukocytosis c/w arrest. Blood cultures collected.   --vancomycin/zosyn x5 days for ECMO  --daily blood cultures  --monitor for signs of infection given cooling, lines, and leukocytosis   Hematology and Oncology: Receiving heparin for ECMO and ASA/ticagrelor for NAZIA.   --cryo PRN fibrinogen < 200; FFP for INR >2  --Transfuse for Hgb<10  --heparin gtt for ECMO with ACT goal 160-180  --US LE w/ arterial duplex per ECMO protocol   --DVT PPX: Heparin as above   Endocrinology: No known medical history. BG elevated.  --insulin gtt  --f/u HgbA1c    Lines: PA catheter May 17, 2020  R femoral arterial and venous ECMO cannulae May 17, 2020  L femoral arterial line May 17, 2020  R radial arterial line May 17, 2020  ETT May 17, 2020  Silva catheter May 17, 2020  OG tube May 17, 2020  Restraint: needed    Current lines are required for patient management       Family update by me today: Yes     Code Status:    The pt was discussed and evaluated with Dr. Alanis, Cardiologist who agrees with the assessment and plan above.     Yash Carpenter MD  [facility]  Cardiology Fellow, PGY-4           H&P:   Ramona Weller is a 39 year old male who was admitted on 5/17/2020.  Patient apparently reported chest pain that started yesterday.  Of note, he was working on his pipes at home and using Drano.  Because the Drano stated on the box that it could cause shortness of breath or chest pain, he did not seek immediate medical attention for his 10/10 substernal chest pain.  Patient's significant other convinced him to go to the hospital, and he took a shower.  After coming out of the shower, he apparently had a syncopal episode.  Initial rhythm on arrival of EMS was asystole.  With chest compressions patient eventually had PEA and then eventually had VF in the field.  He received multiple  cycles of epinephrine and had ROSC.  He was also intubated in the field.    Patient was brought to [facility] ED, where he regained a pulse and he was brought up to the Cath Lab for intervention.  The patient had BP of 90/60 as he was being transported from ER but had recurrent cardiac arrest on arrival in cath lab.  Initial ECG showed ST elevation, particularly in aVR suggestive of LMCA stenosis.  Otherwise, it did not localize MI. While in the Cath Lab, he again went into cardiac arrest. He was promptly placed on ECMO. he had single-vessel CAD with thrombotic occlusion of proximal LAD.  He underwent successful aspiration thrombectomy of the proximal LAD.    Witnessed arest (y/n): yes  Home or public location (y/n): home  Bystander CPR (y/n): unknown  Initial rhythm: Asystole  Did they have intermittent ROSC (y/n): yes  # of shocks: no  Epi: 3 mg  Amio: unknown  O2 sat en route: 92    Initial rhythm in the cath lab: SR    Father Pilo (next of kin), decision maker: [phone] dad Pilo  Stepmom (Dad's wife): Audrey    Mother of children (ex-girlfriend): [phone]           Medications:   I have reviewed this patient's current medications    No past medical history on file.    No family history on file.  Social History     Socioeconomic History     Marital status: Not on file     Spouse name: Not on file     Number of children: Not on file     Years of education: Not on file     Highest education level: Not on file   Occupational History     Not on file   Social Needs     Financial resource strain: Not on file     Food insecurity     Worry: Not on file     Inability: Not on file     Transportation needs     Medical: Not on file     Non-medical: Not on file   Tobacco Use     Smoking status: Not on file   Substance and Sexual Activity     Alcohol use: Not on file     Drug use: Not on file     Sexual activity: Not on file   Lifestyle     Physical activity     Days per week: Not on file  "    Minutes per session: Not on file     Stress: Not on file   Relationships     Social connections     Talks on phone: Not on file     Gets together: Not on file     Attends Yarsanism service: Not on file     Active member of club or organization: Not on file     Attends meetings of clubs or organizations: Not on file     Relationship status: Not on file     Intimate partner violence     Fear of current or ex partner: Not on file     Emotionally abused: Not on file     Physically abused: Not on file     Forced sexual activity: Not on file   Other Topics Concern     Not on file   Social History Narrative     Not on file            Review of Systems:   Constitutional: negative  Skin: negative  Musculoskeletal: negative  Eyes: negative  ENT: negative  Endocrine: negative  Respiratory: negative  Cardiovascular: negative  Heme: negative  Lymph: negative  GI: negative  : negative  Neuro: as above  Psych: as above            Physical Exam:   @Vitals: Temp 93.4  F (34.1  C)   Resp (P) 15   Ht 1.676 m (5' 6\")   Wt 105.1 kg (231 lb 11.3 oz)   SpO2 98%   BMI 37.40 kg/m    BMI= Body mass index is 37.4 kg/m .   GENERAL APPEARANCE: Intubated, sedated. NAD.HEENT: JVP 7. No icterus, PERRL 2 mm, ETT in place, OG tube in place  CARDIOVASCULAR: regular rate and rhythm, normal S1 and S2, no S3 or S4 and no murmur, click or rub. Normal PMI. Pulses dopplerable.  RESP: Coarse bilaterally. Mechanical ventilation.    GASTRO: Soft, bowel sounds hypoactive but present  GENITOURINARY: Silva in place.  EXTREMITIES: Cool, 1+ edema, pulses dopplered, as above. VA ECMO cannulas in right groin, ThermoGard and no IO in place right Tibia  NEURO: Sedated and intubated, Pupils equal and reactive  INTEGUMENTARY: No rashes. Cannula/Line sites CDI  LINES/TUBES/DRAINS: (noted below) V-A ECMO Cannulas R groin, arterial sheath and ThermoGard in L groin. R radial Arterial line. ETT. OG. Silva Catheter.    Arterial Blood Gas:   Recent Labs   Lab " 05/18/20  0806 05/18/20  0542 05/18/20  0353 05/18/20  0204   PH 7.34* 7.35 7.33* 7.33*   PCO2 40 38 39 38   PO2 83 84 68* 73*   HCO3 22 21 21 20*   O2PER 50 50 50 50     CXR: pending   Vitals:    05/17/20 1700 05/18/20 0400   Weight: 106 kg (233 lb 11 oz) 105.1 kg (231 lb 11.3 oz)   I/O last 3 completed shifts:  In: 3207.6 [I.V.:1657.6; NG/GT:50; IV Piggyback:1000]  Out: 5120 [Urine:4320; Emesis/NG output:800]  Recent Labs   Lab 05/18/20 0353 05/17/20 2205   * 145*   POTASSIUM 3.8 3.3*   CHLORIDE 115* 115*   CO2 22 16*   ANIONGAP 9 13   *  132* 155*  164*   BUN 22 22   CR 1.20 1.21   TEN 7.6* 7.4*     No components found for: URINE   Recent Labs   Lab 05/18/20 0353 05/17/20 2205 05/17/20  1615   AST 1,777* 1,834* 1,138*   * 978* 972*   BILITOTAL 0.7 0.7 0.5   ALBUMIN 3.6 3.8 3.2*   PROTTOTAL 6.2* 6.5* 5.7*   ALKPHOS 48 60 84     Temp: 93.4  F (34.1  C) Temp src: EsophagealTemp  Min: 91.9  F (33.3  C)  Max: 93.7  F (34.3  C)   Recent Labs   Lab 05/18/20 0353 05/17/20 2205 05/17/20 1615 05/17/20  1334   WBC 15.8* 17.2* 23.9*  --    HGB 11.4* 12.3* 12.0* 11.6*   HCT 33.7* 35.6* 35.2*  --    MCV 89 87 89  --    RDW 12.8 12.5 12.5  --     188 236  --      Recent Labs   Lab 05/18/20 0353 05/17/20 2205 05/17/20  1615   INR 1.33* 1.43* 1.59*   PTT 68* 51* 170*     Recent Labs   Lab 05/18/20  0353 05/17/20  2205 05/17/20  1615 05/17/20  1608 05/17/20  1334   *  132* 155*  164* 260* 276* 422*       Lines:           Data:   All lab results reviewed past 24 hours    Recent Results (from the past 24 hour(s))   Cardiac Catheterization    Narrative      Acute anterolateral myocardial infarction, with out of hospital cardiac   arrest, ROSC, and recurrent arrest on arrival to cath lab    Single vessel CAD with thrombotic occlusion of proximal LAD    Successful aspiration thrombectomy of the proximal LAD    Successful stenting of proximal-mid LAD with NAZIA (3.0x20 Synergy),    post-dilation to 4.0 mm guided by IVUS.    Successful POBA (with final kissing balloon) of ostial D1    Cardiac resuscitation with inotropic therapy    CPR    Successful placement of periperhal V-A ECMO with Cardiohelp (RFA, RFV)    Successful placement of Zoll Quattro cooling catheter for Thermoguard   hypothermia    Successful placement of RT radial arterial line    IVUS was performed on the proximal/mid LAD post stenting    Successful insertion of distal antegrade Flex sheath into RT SFA      CT Head w/o Contrast    Narrative    CT HEAD W/O CONTRAST 5/17/2020 3:57 PM    Provided History: Altered level of consciousness (LOC), unexplained    Comparison: None.    Technique: Using multidetector thin collimation helical acquisition  technique, axial, coronal and sagittal CT images from the skull base  to the vertex were obtained without intravenous contrast.     Findings:  Images are moderately limited due to venous contrast  contamination due to prior contrast administration.  No intracranial mass effect, or midline shift. The ventricles are  proportionate to the cerebral sulci. The gray to white matter  differentiation of the cerebral hemispheres is preserved. The basal  cisterns are patent.    Scattered mucosal thickening within the paranasal sinuses and complete  opacification of the right nasal cavity is of uncertain clinical  significance in this patient with intubation. The mastoid air cells  are clear.       Impression    Impression: No acute intracranial pathology.    I have personally reviewed the examination and initial interpretation  and I agree with the findings.    SACHA SOTELO MD   CT Chest Abdomen Pelvis w/o Contrast    Narrative    EXAMINATION: CT CHEST ABDOMEN PELVIS W/O CONTRAST, 5/17/2020 4:02 PM    TECHNIQUE:  Helical CT images from the lung bases through the  symphysis pubis were obtained without IV contrast.     COMPARISON: None    HISTORY: Status post cardiac arrest    FINDINGS:  LINES and  TUBES: Gastric tube tip is coiled within the stomach. Right  common femoral vein ECMO cannula tip terminates in the superior  cavoatrial junction. Right common femoral arterial cannula tip  terminates in the junction of the right aortoiliac bifurcation. Left  common femoral venous catheter tip terminates in the proximal IVC.  Left common femoral artery approach balloon pump inferior metallic  marker in the infrarenal abdominal aorta just distal to the renal  artery origins and the proximal metallic marker is in the proximal  descending thoracic aorta.    CHEST:  LUNGS: Endotracheal tube tip terminates in the mid thoracic trachea  the central tracheobronchial tree is patent and. Debris opacifying of  the right lower lobar pulmonary bronchi. Dense bilateral dependent  pulmonary consolidations with air bronchograms. Mild hazy groundglass  opacities seen anteriorly to the dependent consolidations. No large  pulmonary mass. No interlobular septal thickening. No pneumothorax or  pleural effusion.    MEDIASTINUM: Unremarkable thyroid the ascending aorta and main  pulmonary artery are normal in caliber. Severe atherosclerotic  calcifications of the left anterior descending coronary artery. No  pericardial effusion. No anterior mediastinal hematoma. No enlarged  thoracic lymph nodes.     ABDOMEN/PELVIS:  LIVER: Unremarkable noncontrast appearance of the liver.  GALLBLADDER: Within normal limits.  PANCREAS: 3 mm round hypodensity within the pancreatic tail likely  representing pancreatic cyst or IPMN. No suspicious pancreatic mass.  No pancreatic ductal dilation.  SPLEEN: Within normal limits.  ADRENAL GLANDS: Within normal limits.  URINARY TRACT: Kidneys are normal in size and position without  suspicious renal mass. Contrast opacification of the renal calyces and  pelvis secondary to recent coronary angiogram. Contrast opacification  of the ureters bilaterally without focal defects. The urinary bladder  is unremarkable with  Silva catheter in place and balloon inflated.  REPRODUCTIVE ORGANS: Unremarkable appearance of the prostate and  seminal vesicles  BOWEL: Dilated fluid-filled loops of small bowel measuring up to 3.0  cm.  No focal transition point. The large bowel is normal in caliber.  No significant inflammatory change about the loops of bowel.    PERITONEUM/FLUID: No free fluid or air in the abdomen or pelvis.    VESSELS: No aneurysmal dilation of the infrarenal abdominal aorta.  Vascular patency cannot be assessed.    LYMPH NODES: No lymphadenopathy in the abdomen or pelvis.    BONES/SOFT TISSUES: Mild compression type deformities of T7-T9. No  suspicious osseous lesions.. Soft tissue fat stranding in the right  groin secondary to line placement.      Impression    IMPRESSION:   1. Dependent bilateral pleural effusions with associated  atelectasis/consolidation, possible aspiration in the setting of  recent cardiac arrest.  2. Borderline dilated fluid-filled loops of small bowel measuring up  to 3.0 cm, likely result of hypotension.   3. Support devices are in appropriate position.  4. 3 mm round hypodensity in the pancreatic tail favor to represent  cyst or IPMN. Consider nonemergent follow-up MR.  5. Diffuse hepatic steatosis.    I have personally reviewed the examination and initial interpretation  and I agree with the findings.    CRISTINA MATTSON MD   XR Chest Port 1 View    Narrative    EXAMINATION:  XR Chest one vw 5/17/2020 4:47 PM.    COMPARISON: Same-day CT at 1549 hours.    HISTORY:  Endotracheal tube placement. ECMO cannula placement    FINDINGS: Portable view of the chest. Endotracheal tube tip projects  over the proximal/mid trachea. Esophageal temperature probe projects  over the proximal esophagus. Gastric tube tip and sidehole project  over the gastric body. Inferior approach ECMO cannula projects over  the right atrium. Intra-aortic balloon pump metallic marker projects  over the proximal descending  thoracic aorta at the level of the  chaim. Pacer pad projects over the left hemithorax. Linear metallic  densities project over the lateral right hemithorax, favored to BE  external to patient as these were not demonstrated on comparison CT  and are atypical in appearance.    Midline trachea. Heart is within normal limits. Pulmonary vasculature  is distinct. Minimally increased haziness throughout bilateral lung  fields compatible with known dependent consolidations. No pleural  effusion or pneumothorax. Upper abdomen is unremarkable.      Impression    IMPRESSION:   1. Endotracheal tube tip projects over the proximal/mid trachea.  2. Inferior approach ECMO cannula tip projects over the high right  atrium.  3. Intra-aortic balloon pump metallic marker projects over the  proximal descending thoracic aorta in appropriate position.  4. Bilateral hazy pulmonary opacities compatible with bilateral  dependent atelectasis as seen on same-day CT.    I have personally reviewed the examination and initial interpretation  and I agree with the findings.    CRISTINA MATTSON MD   XR Chest Port 1 View    Narrative    XR CHEST PORT 1 VW  5/18/2020 1:48 AM    History:  Check endotracheal tube placement and ECLS cannula  placement. DO NOT log-roll patient.  Place film under patient using  patient safety handling process..     Comparison: Chest radiograph dated 5/17/2020    Findings:   Supine portable AP chest radiograph. Endotracheal tube with the tip  projects 4.5 cm above chaim. Intra-aortic pump metallic clip at the  level of chaim, unchanged. Stable gastric tube and inferior ECMO  catheter. Temperature probe with the tip projects over mid esophagus.    Cardiac silhouette is at upper normal limit. Pulmonary vasculature is  relatively distinct. No significant change in bilateral interstitial  and airspace opacities. Low lung volumes. No pneumothorax or  significant pleural effusion.      Impression    IMPRESSION:  1.   Temperature probe is advanced with the tip projects over mid  esophagus. Otherwise stable supportive lines and tubes.  2.  No significant change in bilateral interstitial and airspace  opacities, which may represent pulmonary edema, atelectasis versus  infection.    I have personally reviewed the examination and initial interpretation  and I agree with the findings.    DARWIN BRUNO MD   Echocardiogram Complete    Narrative    [id number]  [id number]  [id number]  [id number]^GRIS^YOSHI           [facility],[place]  Echocardiography Laboratory  [address] [place] [address]     Name: FAINA FORRESTER  MRN: [id number]  : 1980  Study Date: 2020 08:16 AM  Age: 39 yrs  Gender: Male  Patient [facility]: [facility]  Reason For Study: Cardiac Arrest  Ordering Physician: YOSHI LANDRY  Performed By: Denisse Johnson RDCS     BSA: 2.1 m2  Height: 66 in  Weight: 231 lb  BP: 132/58 mmHg  _____________________________________________________________________________  __        Procedure  Complete Portable Echo Adult. Technically difficult study.Extremely poor  acoustic windows.  _____________________________________________________________________________  __        Interpretation Summary  VA ECMO at 3.7L/min. Technically difficult study. Extremely poor acoustic  windows.  Severely (EF <30%) reduced left ventricular function on extremely limited  views.  The right ventricle cannot be assessed.  No pericardial effusion is present.  Previous study not available for comparison.  _____________________________________________________________________________  __        Left Ventricle  Left ventricular wall thickness cannot evaluate. Left ventricular size is  normal. Severely (EF <30%) reduced left ventricular function is present. Left  ventricular diastolic function is not assessable. Severe diffuse hypokinesis  is present.     Right Ventricle  The right ventricle cannot be assessed. Right ventricular  function cannot be  assessed due to poor image quality.     Mitral Valve  The mitral valve cannot be assessed.        Aortic Valve  The aortic valve opens with each cardiac cycle. On Doppler interrogation,  there is no significant stenosis or regurgitation.     Tricuspid Valve  The tricuspid valve cannot be assessed. Pulmonary artery systolic pressure  cannot be assessed.     Pulmonic Valve  The pulmonic valve cannot be assessed.     Vessels  The aorta root cannot be assessed. The thoracic aorta cannot be assessed.  Venous ECMO cannula is visualized traversing the IVC.     Pericardium  No pericardial effusion is present.     Compared to Previous Study  Previous study not available for comparison.     _____________________________________________________________________________  __                       Report approved by: Ashlee Izquierdo 05/18/2020 09:18 AM                 _____________________________________________________________________________  __        I have seen and examined the patient with the [facility] team. I agree with the assessment and plan of the note above.I have reviewed pertinent labs.     Jefferson Alanis MD  Interventional Cardiology  Pager: [phone]

## 2020-05-18 NOTE — CONSULTS
[facility]  Palliative Care Consultation Note    Patient: Scot Bishop  Date of Admission:  5/17/2020    Requesting Clinician / Team: CVTS   Reason for consult: Goals of care  Decisional support  Patient and family support    Recommendations:    Patient seen and examined.  Reviewed with bedside nurse.    Family updated with patient's status and reviewed tenets and principles of palliative care.  Offered support for patient's young children (ages 6 and 10).  Patient's father reports family is doing okay-harder in the setting of COVID-19 restrictions.    Family declined  support.    Offered encouragement and compassionate listening to family struggles of not being able to visit.  Encouraged them to send picture files so we can hang them in his room.    These recommendations have been discussed with patient's family and with primary team..      Thank you for the opportunity to participate in the care of this patient and family. Our team: will continue to follow.     During regular M-F work hours -- if you are not sure who specifically to contact -- please contact us by sending a text page to our team consult pager at [phone].    After regular work hours and on weekends/holidays, you can call our answering service at [phone]. Also, who's on call for us is available in [employer].   Dustin OCAMPO NP  Nurse Practitioner- Lead Advanced Practice Provider  [facility] Palliative Medicine Consult Service   [phone]  TT spent: 45 minutes of which 25 minutes were spent in direct face to face contact with patient/family. Greater than 50% of time spent counseling and/or coordinating care.     Assessments:  Scot iBshop is a 39 year old male admitted on 5/17/2020 via EMS to [facility] ED post cardiac arrest.  Patient apparently reported chest pain 5/16 but did not seek medical attention recalling he was working on his pipes at home and using Drano,  wthe warning on the box indicating it could cause shortness of breath or chest pain. The patient's significant other prevailed on him to go to the hospital, thereafter he experienced a syncopal episode and LOC.    EMS called and arrived with patient in asystole--> PEA--> VF in the field. Ongoing CPR and multiple cycles of epinephrine--> ROSC.    Patient was brought to the [facility]  ED, and experienced recurrent cardiac arrest on arrival in cath lab, promptly placed on ECMO. Was found to have single-vessel CAD with thrombotic occlusion of proximal LAD--> successful aspiration thrombectomy of the proximal LAD.     Today, the patient was seen for palliative care consultation in the setting of recent V. fib arrest and now ECMO and intra-aortic balloon support.    Prognosis, Goals, & Planning:      Functional Status just prior to hospitalization: 0 (Fully active, able to carry on all activities without restriction)      Prognosis, Goals, and/or Advance Care Planning were addressed today: Yes        Summary/Comments: Family is hopeful for full recovery.  The critical nature of his illness was reviewed.  Patient's father indicates that he believes he knows his son and what his limitations might be and will review them if it becomes needed.    Patient's decision making preferences: independently          Patient has decision-making capacity today for complex decisions: No            I have concerns about the patient/family's health literacy today: No           Patient has a completed Health Care Directive: No.       Code status: full code    Coping, Meaning, & Spirituality:   Mood, coping, and/or meaning in the context of serious illness were addressed today: Yes  Summary/Comments: Family declined  support.  His family is his primary means of support.  He enjoys riding his motorcycle and boating, especially fishing.  In all seasons he enjoys spending time with his family.  Likes to watch movies.  Father indicates  patient is not Hoahaoism per se.    Social:     Living situation: Lives with significant other in [place].  Has joint custody of his 2 children (Braulio and Neeru).    Daniel family / caregivers: Father is his primary decision-maker (Pilo).  He is estranged from his birth mother.  Close to other siblings.    Occupational history: Worked as a  especially for bathrooms.  Father indicates that this was his dream job.    Financial concerns: Family is questioning whether he has medical insurance.  Will need to review.    History of Present Illness:  Reviewed present status with family present on phone. Minimally responsive    Key Palliative Symptom Data:  We are not helping to manage these symptoms currently in this patient.      ROS:  Comprehensive ROS is unable to be obtained secondary to critically ill and intubated status     Past Medical History:  No past medical history on file.     Past Surgical History:  Past Surgical History:   Procedure Laterality Date     CV CORONARY ANGIOGRAM N/A 5/17/2020    Procedure: Coronary Angiogram;  Surgeon: Chadd Newby MD;  Location: [facility] CARDIAC CATH LAB     CV EXTRACORPERAL MEMBRANE OXYGENATION N/A 5/17/2020    Procedure: Extracorporeal Membrance Oxygenation;  Surgeon: Chadd Newby MD;  Location: [facility] CARDIAC CATH LAB     CV INTRA-AORTIC BALLOON PUMP INSERTION N/A 5/17/2020    Procedure: Intra-Aortic Balloon Pump Insertion;  Surgeon: Chadd Newby MD;  Location: [facility] CARDIAC CATH LAB     CV PCI STENT DRUG ELUTING N/A 5/17/2020    Procedure: Percutaneous Coronary Intervention Stent Drug Eluting;  Surgeon: Chadd Newby MD;  Location:  [facility]         Family History:  No family history on file. Reveiwed with patient's father- no cardiac history     Allergies:  Allergies not on file     Medications:  I have reviewed this patient's medication profile and medications from this hospitalization.        Physical Exam:  Vital Signs: Temp: 93  F (33.9  C) Temp src: Esophageal     Heart Rate: 96 Resp: 15 SpO2: 100 % O2 Device: Mechanical Ventilator    Weight: 231 lbs 11.26 oz  General appearance: No acute distress.  Intubated and sedated.  In ICU bed.  ECMO and balloon pump support in place.  HEENT: Orally intubated.  Mucous membranes moist.  NC/AT.  Cardiac: S1-S2, RRR, balloon pump noise and precordium.  Lungs: Mechanical breath sounds with crackles bilaterally on anterior exam.  Abdomen: Soft, nondistended, no masses palpable on brief exam.  Extremities: Trace bilateral lower extremity edema.  No cyanosis.  Peripheral pulses intact.  Skin: Normal appearance without worrisome lesions on exposed surfaces.  Musculoskeletal: No swelling or redness of exposed joint surfaces.  Neurologic: Intubated and sedated.  Data reviewed:  ROUTINE ICU LABS (Last four results)  CMP  Recent Labs   Lab 05/18/20  1606 05/18/20  0955 05/18/20  0954 05/18/20  0353 05/17/20  2205 05/17/20  1615    143  --  145* 145* 140   POTASSIUM 4.1 4.2  --  3.8 3.3* 4.2   CHLORIDE 113* 114*  --  115* 115* 107   CO2 22 23  --  22 16* 15*   ANIONGAP 9 6  --  9 13 18*   *  123* 136* 144* 129*  132* 155*  164* 260*   BUN 20 21  --  22 22 18   CR 1.03 1.04  --  1.20 1.21 1.24   GFRESTIMATED 90 89  --  75 74 41*   GFRESTBLACK >90 >90  --  87 86 48*   TEN 7.4* 7.5*  --  7.6* 7.4* 6.6*   MAG  --  2.0  --  2.4* 2.0 1.9   PHOS  --   --   --  3.4  --   --    PROTTOTAL PENDING 6.3*  --  6.2* 6.5* 5.7*   ALBUMIN PENDING 3.5  --  3.6 3.8 3.2*   BILITOTAL PENDING 0.7  --  0.7 0.7 0.5   ALKPHOS PENDING 51  --  48 60 84   AST PENDING 1,477*  --  1,777* 1,834* 1,138*   ALT PENDING 895*  --  932* 978* 972*     CBC  Recent Labs   Lab 05/18/20  1606 05/18/20  0955 05/18/20  0353 05/17/20  2205   WBC 12.3* 15.5* 15.8* 17.2*   RBC 3.54* 3.79* 3.80* 4.09*   HGB 10.8* 11.5* 11.4* 12.3*   HCT 31.8* 34.0* 33.7* 35.6*   MCV 90 90 89 87   MCH 30.5 30.3 30.0  30.1   MCHC 34.0 33.8 33.8 34.6   RDW 13.0 12.9 12.8 12.5   * 176 173 188     INR  Recent Labs   Lab 05/18/20  1606 05/18/20  0955 05/18/20  0353 05/17/20  2205   INR 1.24* 1.24* 1.33* 1.43*     Arterial Blood Gas  Recent Labs   Lab 05/18/20  1606 05/18/20  1419 05/18/20  1204 05/18/20  0954   PH 7.37 7.38 7.37 7.33*   PCO2 43 40 41 44   PO2 230* 120* 209* 85   HCO3 25 24 23 23   O2PER 40 50 50 50

## 2020-05-18 NOTE — PROGRESS NOTES
ECMO Shift Summary:      ECMO Equipment:  Console serial number: [id number]  Circuit Lot number: [id number]  Oxygenator Lot number: [id number]        Patient remains on VA ECMO, all equipment is functioning and alarms are appropriately set. RPM's 5607-8606 with flow range 3.64-4.2 L/min. Sweep gas is at 2 LPM and FiO2 80%. Circuit remains free of air, clot and fibrin. Cannulas are secure with oozing from bilateral groin sites, mouth and IV sites. Extremities are cold, pale, with weak doppler pulses..    Significant Shift Events: Transferred over to  around 1915 after r/o Covid on 4A. Thermoguard cooling patient to 34 degrees. IABP 1:1. EEG placed on head. Patient gtts are Cist, Versed, Fent, Nipride. Placed 10lb sandbag over groin area to decrease oozing at insertion sites with good outcome. Mouth bleeding has subsided. Stent placed in proximal LAD.       Vent settings:  Ventilation Mode: CMV/AC  (Continuous Mandatory Ventilation/ Assist Control)  FiO2 (%): 50 %  Rate Set (breaths/minute): 15 breaths/min  Tidal Volume Set (mL): 400 mL  PEEP (cm H2O): 8 cmH2O  Oxygen Concentration (%): 50 %  Resp: 15  .    Heparin was started at 2000 and currently running at 1000 units/hr with no boluses ordered in protocol. ACT range 150-163. Goal ACT was lowered from 180-200, to 160-180 due to profuse bleeding from OG and mouth sites.     Urine output is large and as RN charted, blood loss was medium, but from OG and mouth mostly. Product given included none.      Intake/Output Summary (Last 24 hours) at 5/18/2020 0418  Last data filed at 5/18/2020 0400  Gross per 24 hour   Intake 3007.4 ml   Output 4570 ml   Net -1562.6 ml       ECHO:  No results found for this or any previous visit.No results found for this or any previous visit.    CXR:  Recent Results (from the past 24 hour(s))   Cardiac Catheterization    Narrative      Acute anterolateral myocardial infarction, with out of hospital cardiac   arrest, ROSC, and recurrent arrest  on arrival to cath lab    Single vessel CAD with thrombotic occlusion of proximal LAD    Successful aspiration thrombectomy of the proximal LAD    Successful stenting of proximal-mid LAD with NAZIA (3.0x20 Synergy),   post-dilation to 4.0 mm guided by IVUS.    Successful POBA (with final kissing balloon) of ostial D1    Cardiac resuscitation with inotropic therapy    CPR    Successful placement of periperhal V-A ECMO with Cardiohelp (RFA, RFV)    Successful placement of Zoll Quattro cooling catheter for Thermoguard   hypothermia    Successful placement of RT radial arterial line    IVUS was performed on the proximal/mid LAD post stenting    Successful insertion of distal antegrade Flex sheath into RT SFA      CT Head w/o Contrast    Narrative    CT HEAD W/O CONTRAST 5/17/2020 3:57 PM    Provided History: Altered level of consciousness (LOC), unexplained    Comparison: None.    Technique: Using multidetector thin collimation helical acquisition  technique, axial, coronal and sagittal CT images from the skull base  to the vertex were obtained without intravenous contrast.     Findings:  Images are moderately limited due to venous contrast  contamination due to prior contrast administration.  No intracranial mass effect, or midline shift. The ventricles are  proportionate to the cerebral sulci. The gray to white matter  differentiation of the cerebral hemispheres is preserved. The basal  cisterns are patent.    Scattered mucosal thickening within the paranasal sinuses and complete  opacification of the right nasal cavity is of uncertain clinical  significance in this patient with intubation. The mastoid air cells  are clear.       Impression    Impression: No acute intracranial pathology.    I have personally reviewed the examination and initial interpretation  and I agree with the findings.    SACHA SOTELO MD   CT Chest Abdomen Pelvis w/o Contrast    Narrative    EXAMINATION: CT CHEST ABDOMEN PELVIS W/O CONTRAST,  5/17/2020 4:02 PM    TECHNIQUE:  Helical CT images from the lung bases through the  symphysis pubis were obtained without IV contrast.     COMPARISON: None    HISTORY: Status post cardiac arrest    FINDINGS:  LINES and TUBES: Gastric tube tip is coiled within the stomach. Right  common femoral vein ECMO cannula tip terminates in the superior  cavoatrial junction. Right common femoral arterial cannula tip  terminates in the junction of the right aortoiliac bifurcation. Left  common femoral venous catheter tip terminates in the proximal IVC.  Left common femoral artery approach balloon pump inferior metallic  marker in the infrarenal abdominal aorta just distal to the renal  artery origins and the proximal metallic marker is in the proximal  descending thoracic aorta.    CHEST:  LUNGS: Endotracheal tube tip terminates in the mid thoracic trachea  the central tracheobronchial tree is patent and. Debris opacifying of  the right lower lobar pulmonary bronchi. Dense bilateral dependent  pulmonary consolidations with air bronchograms. Mild hazy groundglass  opacities seen anteriorly to the dependent consolidations. No large  pulmonary mass. No interlobular septal thickening. No pneumothorax or  pleural effusion.    MEDIASTINUM: Unremarkable thyroid the ascending aorta and main  pulmonary artery are normal in caliber. Severe atherosclerotic  calcifications of the left anterior descending coronary artery. No  pericardial effusion. No anterior mediastinal hematoma. No enlarged  thoracic lymph nodes.     ABDOMEN/PELVIS:  LIVER: Unremarkable noncontrast appearance of the liver.  GALLBLADDER: Within normal limits.  PANCREAS: 3 mm round hypodensity within the pancreatic tail likely  representing pancreatic cyst or IPMN. No suspicious pancreatic mass.  No pancreatic ductal dilation.  SPLEEN: Within normal limits.  ADRENAL GLANDS: Within normal limits.  URINARY TRACT: Kidneys are normal in size and position without  suspicious  renal mass. Contrast opacification of the renal calyces and  pelvis secondary to recent coronary angiogram. Contrast opacification  of the ureters bilaterally without focal defects. The urinary bladder  is unremarkable with Silva catheter in place and balloon inflated.  REPRODUCTIVE ORGANS: Unremarkable appearance of the prostate and  seminal vesicles  BOWEL: Dilated fluid-filled loops of small bowel measuring up to 3.0  cm.  No focal transition point. The large bowel is normal in caliber.  No significant inflammatory change about the loops of bowel.    PERITONEUM/FLUID: No free fluid or air in the abdomen or pelvis.    VESSELS: No aneurysmal dilation of the infrarenal abdominal aorta.  Vascular patency cannot be assessed.    LYMPH NODES: No lymphadenopathy in the abdomen or pelvis.    BONES/SOFT TISSUES: Mild compression type deformities of T7-T9. No  suspicious osseous lesions.. Soft tissue fat stranding in the right  groin secondary to line placement.      Impression    IMPRESSION:   1. Dependent bilateral pleural effusions with associated  atelectasis/consolidation, possible aspiration in the setting of  recent cardiac arrest.  2. Borderline dilated fluid-filled loops of small bowel measuring up  to 3.0 cm, likely result of hypotension.   3. Support devices are in appropriate position.  4. 3 mm round hypodensity in the pancreatic tail favor to represent  cyst or IPMN. Consider nonemergent follow-up MR.  5. Diffuse hepatic steatosis.    I have personally reviewed the examination and initial interpretation  and I agree with the findings.    CRISTINA MATTSON MD   XR Chest Port 1 View    Narrative    EXAMINATION:  XR Chest one  5/17/2020 4:47 PM.    COMPARISON: Same-day CT at 1549 hours.    HISTORY:  Endotracheal tube placement. ECMO cannula placement    FINDINGS: Portable view of the chest. Endotracheal tube tip projects  over the proximal/mid trachea. Esophageal temperature probe projects  over the proximal  esophagus. Gastric tube tip and sidehole project  over the gastric body. Inferior approach ECMO cannula projects over  the right atrium. Intra-aortic balloon pump metallic marker projects  over the proximal descending thoracic aorta at the level of the  chaim. Pacer pad projects over the left hemithorax. Linear metallic  densities project over the lateral right hemithorax, favored to BE  external to patient as these were not demonstrated on comparison CT  and are atypical in appearance.    Midline trachea. Heart is within normal limits. Pulmonary vasculature  is distinct. Minimally increased haziness throughout bilateral lung  fields compatible with known dependent consolidations. No pleural  effusion or pneumothorax. Upper abdomen is unremarkable.      Impression    IMPRESSION:   1. Endotracheal tube tip projects over the proximal/mid trachea.  2. Inferior approach ECMO cannula tip projects over the high right  atrium.  3. Intra-aortic balloon pump metallic marker projects over the  proximal descending thoracic aorta in appropriate position.  4. Bilateral hazy pulmonary opacities compatible with bilateral  dependent atelectasis as seen on same-day CT.    I have personally reviewed the examination and initial interpretation  and I agree with the findings.    CRISTINA MATTSON MD       Labs:  Recent Labs   Lab 05/18/20  0204 05/17/20  2344 05/17/20  2205 05/17/20 2006   PH 7.33* 7.37 7.40 7.38   PCO2 38 31* 27* 28*   PO2 73* 97 242* 108*   HCO3 20* 18* 16* 17*   O2PER 50 50 50 50       Lab Results   Component Value Date    HGB 11.4 (L) 05/18/2020    PHGB 70 (H) 05/17/2020     05/18/2020    FIBR 239 05/18/2020    INR 1.33 (H) 05/18/2020    PTT 68 (H) 05/18/2020    DD >20.0 (H) 05/17/2020    AXA 0.28 05/18/2020         Plan is remain on VA ECMO. Wean and support as tolerated.       Monica Hernandez RN  5/18/2020 4:18 AM

## 2020-05-18 NOTE — PLAN OF CARE
D: Post cardiac arrest. ECMO/IABP/ hypothermia protocol. Sedated with versed/fentanyl, paralyzed with cisatracurium. Bis 20s-402s.  I/A: Noted HR and BP increasing this am. Propofol started. Nipride stopped. Started rewarming at 1600.   R: Will continue to monitor/assess and update MD as needed.

## 2020-05-18 NOTE — PROGRESS NOTES
[facility]  Palliative Care Social Work Note:    Patient Info:  Scot Bishop is a 39 year old male with anterior stemi requiring ECMO.     Brief summary of visit: Joint phone call to Scot's dad & step-mom to introduce our palliative care team and role. Discussed how family (including 2 children ages 6 & 10) are coping and how our team can be helpful. Gerald reports that they're doing ok, but then started crying. We talked about how difficult this situation is, especially since they can't be here. They did enjoy their video call with Scot earlier today and are looking forward to a second call this afternoon.        Date of Admission: 5/17/2020    Reason for consult: Patient and family support    Sources of information: Family member -dad and step-mom  Staff -palliative care team  Other -chart review    Recommendations & Plan:  -PCSW will remain available for support as needed.      These recommendations have been discussed with Gerald and Audrey.    Symptoms & Concerns Addressed Today:  Emotional -discussed family's coping  Physical -discussed Scot's current condition    Strengths Identified:    -Good family support     Relationships & Support:  Aspects of relationships and support assessed today:    Identified family members: 2 children (ages 6 & 10, with Scot about 40%), dad and step-mom, brother, sister.     Professional supports: none identified     Family coping: Dad reports that they're doing ok, except Scot's 10 year old daughter. But he wasn't able to talk much about how she's struggling.     Bereavement Risk concerns: moderate    Coping, Mental Health & Adjustment to Illness:   Other -family's adjustment to illness. Will need to reassess once Scot is able to participate in conversation    Goals, Decision Making & Advance Care Planning:   Prognosis, Goals, and/or Advance Care Planning were assessed today: Yes  If yes, brief summary of discussion: Dad briefly talked about  knowing what Scot would want and would be able to say so when it's time.   Preferred language: English  Patient's decision making preferences: unable to assess  I have concerns about the patient/family's health literacy today: No  Patient has a completed Health Care Directive: No.   Code status per chart review: full code    Key Palliative Symptom Data:  We are not managing pain in this patient  We are not managing dyspnea in this patient  We are not managing nausea in this patient  We are not managing anxiety in this patient      Clinical Social Work Interventions:   Assessment of palliative specific issues    Introduction of Palliative clinical social work interventions   Adjustment to illness counseling  Facilitation of processing of thoughts/feelings  Family communication facilitated      ERMELINDA Betancur, [facility]  Palliative Care Clinical   Pager [phone]    [facility] Inpatient Team Consult Pager [phone] Mon-Fri 8-4:30  After hours Answering Service [phone]

## 2020-05-18 NOTE — PROGRESS NOTES
"[facility] Unit 4E  Palliative Care Initial Spiritual Assessment    Patient: Scot Bishop  Date of Admission:  5/17/2020  Reason for consult: emotional distress and patient/family coping      Summary and Recommendations:  Family of patient Scot Bishop are mutually supportive and supportive of him.     Per family, Scot relies primarily on family for coping support and meaning; spirituality/personal beliefs are not a significant part of his meaning-making.     Family is appreciative of emotional support.    Bela Gonzales  Palliative   Pager [phone]  [facility] Inpatient Team Consult pager [phone] (M-F 8-4:30)  After-hours Answering Service [phone]      Assessments:   Telephone conversation with patient Scot Bishop's father Gerald and step-mother Audrey with Palliative Care XIMENA Mak and JIMMY Mosley.    Distress:  Distress in the context of serious illness was assessed today:    Existential/spiritual/emotional distress: Yes; father Gerald was tearful as he shared his concerns for Scot and for how Scot's children (5 yo and 10 yo) are coping.      Islam distress:  No; not a source of meaning/coping per father Gerald.      Social/economic/relational distress:  Yes; social isolation due to pandemic is a barrier. Family have done a video call with Scot and plan to do more.    Coping, Meaning, & Spirituality:   Coping, meaning, and/or spirituality in the context of serious illness were assessed today:    Spiritual background and preferences: none, per father Gerald      Beliefs, rituals, and practices: Time spent with family, especially his children, is most important to Scot. He values time spent outside, riding his motorcycle and boating.       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      Preferred language: English      Patient's decision making preferences: unable to assess      I have concerns about the patient/family's health literacy today: No      Patient has a completed Health Care Directive: No.       Code status per chart review: full code    Key Palliative Symptom Data:  We are not helping to manage these symptoms currently in this patient.      Interventions:  I offered emotional and crisis support through reflective listening and values-based discussion of needs and goals of care.              "

## 2020-05-18 NOTE — CONSULTS
"[facility]  Neurocritical Care Consultation    Patient Name:  Scot Bishop  MRN:  [id number]    :  1980  Date of Service:  May 17, 2020  Primary care provider:  No primary care provider on file.      Neurology consultation service was asked to see Scot Bishop by Dr. Kamara to evaluate post-cardiac arrest.    History of Present Illness:   39 year old male with unknown history (per chart) who presented on 20 with cardiac arrest.     Pt developed chest pain 1 day prior to presentation. On day of admission, pts significant other convinced him to go to ED, so he went to shower. After showering, had a syncopal episode. Unknown if bystander CPR was started. EMS was activated - on arrival, rhythm was asystole. Chest compressions started. Then had PEA, followed by VF. Given epinephrine multiple times (unclear how many) with ROSC. No shocks. Intubated in the field, then brought to [facility]. Taken for cath. While in cath lab, had recurrent cardiac arrest x 2. Placed on ECMO promptly. During cath, found to have LAD occlusion - successfully underwent thrombectomy.     On arrival to floor, noted to have shivering so increased sedation and started on a paralytic. Currently cooled and on versed, fentanyl and cisatracurium.       ROS  A 10-point ROS was  not performed due to mental status.      PMH  No past medical history on file.  No past surgical history on file.    Medications   No medications prior to admission.       Allergies  Allergies not on file    Social History  Social History     Tobacco Use     Smoking status: Not on file   Substance Use Topics     Alcohol use: Not on file       Family History    No family history on file.      Physical Examination   Vitals: Temp 93  F (33.9  C)   Resp 15   Ht 1.676 m (5' 6\")   Wt 106 kg (233 lb 11 oz)   SpO2 98%   BMI 37.72 kg/m    General: Adult male patient, lying in bed, intubated   Neuro: intubated, examined on " sedation/paralytic. Not following commands. Pupils are 2 mm on R, 1 mm on L, both reactive to light, eyes are conjugate. Corneal and cough absent. Roving eye movements are not observed. No abnormal movements noted. Painful stimuli deferred given sedation.     Investigations     CT head: No acute intracranial pathology.    Impression  39 year old male with unknown history (per chart) who presented on 5/17/20 with cardiac arrest. Exam is limited by sedation/paralytic and remains cooled at this time. Initial CT head without evidence of anoxic injury through limited by contamination from contrast. EEG started this evening, pending report.     Recommendations  - Continue video EEG  - Wean sedation as able  - We will continue to follow along.    Thank you for involving Neurology in the care of Scot Bishop.  Please do not hesitate to call with questions/concerns (consult pager [phone]).      Patient was discussed with neuro ICU fellow, Dr. Fitzpatrick. The attending is Dr. Mistry.    Cortney Goldman MD  Neurology PGY2  [phone]

## 2020-05-18 NOTE — PLAN OF CARE
Major Shift Events:  Hypothermia protocol continues thru 1600 today. Ecmo RPM 3500 Flows 3.6-37, sweep 2 FiO2 100%. No product or fluid given throughout the night.  -225/hour.    Dark red OG output of 800 cc over night, Protonix increased to BID, three small/medium mucoid stools over night.  Hgb stable.  ACT goal decreased to 160-180, d/t bleeding issues.  Lactate trending down from 10 ->4,.  Nipride restarted d/t MAP of 100, 0.25 to keep MAP < 90.    Pt paralyzed on Cis at 4, Versed decreased to 4 per Neuro request and Fentanyl at 100, BIS 8-20  TOF 0/4.  Right pupil slightly larger than left at midnight assessment, MD's aware, NIRS stable in 60's. Later pupils equal/sluggish bilaterally.   Plan: Continue with hypothermia until 1600 then rewarm per orders.   For vital signs and complete assessments, please see documentation flowsheets.        Admitted/transferred from: Cath lab to  now 4E  Reason for admission/transfer: VT/VF cardiac arrest  2 RN skin assessment: completed by Farzaneh Lopes and Corinne Mondragon RN  Result of skin assessment and interventions/actions: Abrasion to midsternal chest, bleeding cut on underside of tongue (bite indentation).  Height, weight, drug calc weight: Complete  Patient belongings: Contents of personal wallet logged and sent to security  MDRO education added to care plan:   NA  ?

## 2020-05-18 NOTE — PLAN OF CARE
D: Arrived from cath lab post cardiac arrest. ECMO, IABP, no vasopressors. Temp 33 C.   I/A: Fluid given for ECMO chugging x2. Coughing, moving extremities. Sedation increased. Started shivering, cisatracurium started. Father, Gerald, updated and blood consent confirmed.   R: Will continue to monitor/assess and update MD as needed.

## 2020-05-19 ENCOUNTER — ANESTHESIA EVENT (OUTPATIENT)
Dept: SURGERY | Facility: CLINIC | Age: 40
End: 2020-05-19
Payer: MEDICAID

## 2020-05-19 ENCOUNTER — ANESTHESIA (OUTPATIENT)
Dept: SURGERY | Facility: CLINIC | Age: 40
End: 2020-05-19
Payer: MEDICAID

## 2020-05-19 ENCOUNTER — APPOINTMENT (OUTPATIENT)
Dept: GENERAL RADIOLOGY | Facility: CLINIC | Age: 40
End: 2020-05-19
Attending: INTERNAL MEDICINE
Payer: MEDICAID

## 2020-05-19 ENCOUNTER — APPOINTMENT (OUTPATIENT)
Dept: CARDIOLOGY | Facility: CLINIC | Age: 40
End: 2020-05-19
Attending: NURSE PRACTITIONER
Payer: MEDICAID

## 2020-05-19 ENCOUNTER — ALLIED HEALTH/NURSE VISIT (OUTPATIENT)
Dept: NEUROLOGY | Facility: CLINIC | Age: 40
End: 2020-05-19
Attending: PSYCHIATRY & NEUROLOGY
Payer: MEDICAID

## 2020-05-19 LAB
ALBUMIN SERPL-MCNC: 2.6 G/DL (ref 3.4–5)
ALBUMIN SERPL-MCNC: 2.9 G/DL (ref 3.4–5)
ALBUMIN SERPL-MCNC: 2.9 G/DL (ref 3.4–5)
ALBUMIN SERPL-MCNC: 3 G/DL (ref 3.4–5)
ALP SERPL-CCNC: 38 U/L (ref 40–150)
ALP SERPL-CCNC: 39 U/L (ref 40–150)
ALP SERPL-CCNC: 44 U/L (ref 40–150)
ALP SERPL-CCNC: 45 U/L (ref 40–150)
ALT SERPL W P-5'-P-CCNC: 525 U/L (ref 0–70)
ALT SERPL W P-5'-P-CCNC: 590 U/L (ref 0–70)
ALT SERPL W P-5'-P-CCNC: 592 U/L (ref 0–70)
ALT SERPL W P-5'-P-CCNC: 592 U/L (ref 0–70)
ANGLE RATE OF CLOT STRENGTH: 66.1 DEGREES (ref 53–72)
ANION GAP SERPL CALCULATED.3IONS-SCNC: 5 MMOL/L (ref 3–14)
ANION GAP SERPL CALCULATED.3IONS-SCNC: 6 MMOL/L (ref 3–14)
ANION GAP SERPL CALCULATED.3IONS-SCNC: 7 MMOL/L (ref 3–14)
ANION GAP SERPL CALCULATED.3IONS-SCNC: 8 MMOL/L (ref 3–14)
APTT PPP: 36 SEC (ref 22–37)
APTT PPP: 69 SEC (ref 22–37)
APTT PPP: 69 SEC (ref 22–37)
APTT PPP: 71 SEC (ref 22–37)
AST SERPL W P-5'-P-CCNC: 442 U/L (ref 0–45)
AST SERPL W P-5'-P-CCNC: 537 U/L (ref 0–45)
AST SERPL W P-5'-P-CCNC: 592 U/L (ref 0–45)
AST SERPL W P-5'-P-CCNC: 630 U/L (ref 0–45)
AT III ACT/NOR PPP CHRO: 57 % (ref 85–135)
BACTERIA SPEC CULT: NORMAL
BASE DEFICIT BLDA-SCNC: 0.1 MMOL/L
BASE DEFICIT BLDA-SCNC: 0.6 MMOL/L
BASE DEFICIT BLDA-SCNC: 1.6 MMOL/L
BASE DEFICIT BLDA-SCNC: 2 MMOL/L
BASE DEFICIT BLDA-SCNC: 2.7 MMOL/L
BASE DEFICIT BLDA-SCNC: 2.9 MMOL/L
BASE DEFICIT BLDV-SCNC: 2 MMOL/L
BASE EXCESS BLDA CALC-SCNC: 0 MMOL/L
BASE EXCESS BLDA CALC-SCNC: 0.5 MMOL/L
BASE EXCESS BLDA CALC-SCNC: 0.7 MMOL/L
BASE EXCESS BLDA CALC-SCNC: 0.9 MMOL/L
BASE EXCESS BLDA CALC-SCNC: 1.3 MMOL/L
BASE EXCESS BLDA CALC-SCNC: 1.4 MMOL/L
BILIRUB SERPL-MCNC: 0.5 MG/DL (ref 0.2–1.3)
BILIRUB SERPL-MCNC: 0.5 MG/DL (ref 0.2–1.3)
BILIRUB SERPL-MCNC: 0.6 MG/DL (ref 0.2–1.3)
BILIRUB SERPL-MCNC: 0.6 MG/DL (ref 0.2–1.3)
BLD PROD TYP BPU: NORMAL
BLD UNIT ID BPU: 0
BLOOD PRODUCT CODE: NORMAL
BPU ID: NORMAL
BUN SERPL-MCNC: 23 MG/DL (ref 7–30)
BUN SERPL-MCNC: 24 MG/DL (ref 7–30)
BUN SERPL-MCNC: 24 MG/DL (ref 7–30)
BUN SERPL-MCNC: 29 MG/DL (ref 7–30)
CA-I BLD-MCNC: 4.3 MG/DL (ref 4.4–5.2)
CA-I BLD-MCNC: 4.5 MG/DL (ref 4.4–5.2)
CALCIUM SERPL-MCNC: 7.2 MG/DL (ref 8.5–10.1)
CALCIUM SERPL-MCNC: 7.6 MG/DL (ref 8.5–10.1)
CALCIUM SERPL-MCNC: 7.6 MG/DL (ref 8.5–10.1)
CALCIUM SERPL-MCNC: 7.7 MG/DL (ref 8.5–10.1)
CHLORIDE SERPL-SCNC: 113 MMOL/L (ref 94–109)
CHLORIDE SERPL-SCNC: 114 MMOL/L (ref 94–109)
CHLORIDE SERPL-SCNC: 115 MMOL/L (ref 94–109)
CHLORIDE SERPL-SCNC: 116 MMOL/L (ref 94–109)
CI HYPOCOAGULATION INDEX: 1.6 RATIO (ref 0–3)
CO2 SERPL-SCNC: 21 MMOL/L (ref 20–32)
CO2 SERPL-SCNC: 23 MMOL/L (ref 20–32)
CO2 SERPL-SCNC: 24 MMOL/L (ref 20–32)
CO2 SERPL-SCNC: 25 MMOL/L (ref 20–32)
CREAT SERPL-MCNC: 1.28 MG/DL (ref 0.66–1.25)
CREAT SERPL-MCNC: 1.32 MG/DL (ref 0.66–1.25)
CREAT SERPL-MCNC: 1.52 MG/DL (ref 0.66–1.25)
CREAT SERPL-MCNC: 1.81 MG/DL (ref 0.66–1.25)
CRP SERPL-MCNC: 89 MG/L (ref 0–8)
D DIMER PPP FEU-MCNC: 3.6 UG/ML FEU (ref 0–0.5)
D DIMER PPP FEU-MCNC: 4.1 UG/ML FEU (ref 0–0.5)
ERYTHROCYTE [DISTWIDTH] IN BLOOD BY AUTOMATED COUNT: 13.2 % (ref 10–15)
ERYTHROCYTE [DISTWIDTH] IN BLOOD BY AUTOMATED COUNT: 13.4 % (ref 10–15)
ERYTHROCYTE [DISTWIDTH] IN BLOOD BY AUTOMATED COUNT: 13.4 % (ref 10–15)
ERYTHROCYTE [DISTWIDTH] IN BLOOD BY AUTOMATED COUNT: 13.6 % (ref 10–15)
ERYTHROCYTE [SEDIMENTATION RATE] IN BLOOD BY WESTERGREN METHOD: 33 MM/H (ref 0–15)
FIBRINOGEN PPP-MCNC: 395 MG/DL (ref 200–420)
FIBRINOGEN PPP-MCNC: 535 MG/DL (ref 200–420)
G ACTUAL CLOT STRENGTH: 9.2 KD/SC (ref 4.5–11)
GFR SERPL CREATININE-BSD FRML MDRD: 45 ML/MIN/{1.73_M2}
GFR SERPL CREATININE-BSD FRML MDRD: 56 ML/MIN/{1.73_M2}
GFR SERPL CREATININE-BSD FRML MDRD: 67 ML/MIN/{1.73_M2}
GFR SERPL CREATININE-BSD FRML MDRD: 69 ML/MIN/{1.73_M2}
GLUCOSE BLD-MCNC: 105 MG/DL (ref 70–99)
GLUCOSE BLD-MCNC: 105 MG/DL (ref 70–99)
GLUCOSE BLD-MCNC: 108 MG/DL (ref 70–99)
GLUCOSE BLD-MCNC: 127 MG/DL (ref 70–99)
GLUCOSE BLD-MCNC: 127 MG/DL (ref 70–99)
GLUCOSE BLD-MCNC: 129 MG/DL (ref 70–99)
GLUCOSE BLD-MCNC: 132 MG/DL (ref 70–99)
GLUCOSE BLD-MCNC: 135 MG/DL (ref 70–99)
GLUCOSE BLDC GLUCOMTR-MCNC: 110 MG/DL (ref 70–99)
GLUCOSE BLDC GLUCOMTR-MCNC: 114 MG/DL (ref 70–99)
GLUCOSE BLDC GLUCOMTR-MCNC: 117 MG/DL (ref 70–99)
GLUCOSE BLDC GLUCOMTR-MCNC: 125 MG/DL (ref 70–99)
GLUCOSE BLDC GLUCOMTR-MCNC: 143 MG/DL (ref 70–99)
GLUCOSE SERPL-MCNC: 104 MG/DL (ref 70–99)
GLUCOSE SERPL-MCNC: 118 MG/DL (ref 70–99)
GLUCOSE SERPL-MCNC: 127 MG/DL (ref 70–99)
GLUCOSE SERPL-MCNC: 141 MG/DL (ref 70–99)
HCO3 BLD-SCNC: 22 MMOL/L (ref 21–28)
HCO3 BLD-SCNC: 22 MMOL/L (ref 21–28)
HCO3 BLD-SCNC: 23 MMOL/L (ref 21–28)
HCO3 BLD-SCNC: 24 MMOL/L (ref 21–28)
HCO3 BLD-SCNC: 24 MMOL/L (ref 21–28)
HCO3 BLD-SCNC: 25 MMOL/L (ref 21–28)
HCO3 BLD-SCNC: 26 MMOL/L (ref 21–28)
HCO3 BLD-SCNC: 27 MMOL/L (ref 21–28)
HCO3 BLDA-SCNC: 23 MMOL/L (ref 21–28)
HCO3 BLDV-SCNC: 24 MMOL/L (ref 21–28)
HCT VFR BLD AUTO: 24.4 % (ref 40–53)
HCT VFR BLD AUTO: 25.8 % (ref 40–53)
HCT VFR BLD AUTO: 26.3 % (ref 40–53)
HCT VFR BLD AUTO: 27.3 % (ref 40–53)
HGB BLD-MCNC: 8.1 G/DL (ref 13.3–17.7)
HGB BLD-MCNC: 8.7 G/DL (ref 13.3–17.7)
HGB BLD-MCNC: 8.7 G/DL (ref 13.3–17.7)
HGB BLD-MCNC: 9 G/DL (ref 13.3–17.7)
HGB FREE PLAS-MCNC: 60 MG/DL
INR PPP: 1.24 (ref 0.86–1.14)
INR PPP: 1.25 (ref 0.86–1.14)
INR PPP: 1.29 (ref 0.86–1.14)
INR PPP: 1.4 (ref 0.86–1.14)
INTERPRETATION ECG - MUSE: NORMAL
K TIME TO SPEC CLOT STRENGTH: 1.8 MINUTE (ref 1–3)
KCT BLD-ACNC: 162 SEC (ref 75–150)
KCT BLD-ACNC: 167 SEC (ref 75–150)
KCT BLD-ACNC: 167 SEC (ref 75–150)
KCT BLD-ACNC: 171 SEC (ref 75–150)
KCT BLD-ACNC: 171 SEC (ref 75–150)
KCT BLD-ACNC: 175 SEC (ref 75–150)
KCT BLD-ACNC: 179 SEC (ref 75–150)
LACTATE BLD-SCNC: 1.9 MMOL/L (ref 0.7–2)
LACTATE BLD-SCNC: 1.9 MMOL/L (ref 0.7–2)
LACTATE BLD-SCNC: 2.3 MMOL/L (ref 0.7–2)
LACTATE BLD-SCNC: 2.8 MMOL/L (ref 0.7–2)
LACTATE BLD-SCNC: 3.7 MMOL/L (ref 0.7–2)
LACTATE BLD-SCNC: 4.8 MMOL/L (ref 0.7–2)
LDH SERPL L TO P-CCNC: 1436 U/L (ref 85–227)
LMWH PPP CHRO-ACNC: 0.14 IU/ML
LMWH PPP CHRO-ACNC: 0.21 IU/ML
LMWH PPP CHRO-ACNC: 0.3 IU/ML
LMWH PPP CHRO-ACNC: <0.1 IU/ML
LY30 LYSIS AT 30 MINUTES: 1.2 % (ref 0–8)
LY60 LYSIS AT 60 MINUTES: 3.7 % (ref 0–15)
Lab: NORMAL
MA MAXIMUM CLOT STRENGTH: 64.8 MM (ref 50–70)
MAGNESIUM SERPL-MCNC: 2.2 MG/DL (ref 1.6–2.3)
MAGNESIUM SERPL-MCNC: 2.3 MG/DL (ref 1.6–2.3)
MAGNESIUM SERPL-MCNC: 2.3 MG/DL (ref 1.6–2.3)
MAGNESIUM SERPL-MCNC: 2.4 MG/DL (ref 1.6–2.3)
MCH RBC QN AUTO: 30.1 PG (ref 26.5–33)
MCH RBC QN AUTO: 30.3 PG (ref 26.5–33)
MCH RBC QN AUTO: 30.8 PG (ref 26.5–33)
MCH RBC QN AUTO: 30.9 PG (ref 26.5–33)
MCHC RBC AUTO-ENTMCNC: 33 G/DL (ref 31.5–36.5)
MCHC RBC AUTO-ENTMCNC: 33.1 G/DL (ref 31.5–36.5)
MCHC RBC AUTO-ENTMCNC: 33.2 G/DL (ref 31.5–36.5)
MCHC RBC AUTO-ENTMCNC: 33.7 G/DL (ref 31.5–36.5)
MCV RBC AUTO: 91 FL (ref 78–100)
MCV RBC AUTO: 92 FL (ref 78–100)
MCV RBC AUTO: 92 FL (ref 78–100)
MCV RBC AUTO: 93 FL (ref 78–100)
O2/TOTAL GAS SETTING VFR VENT: 40 %
O2/TOTAL GAS SETTING VFR VENT: 50 %
O2/TOTAL GAS SETTING VFR VENT: 60 %
OXYHGB MFR BLD: 91 % (ref 92–100)
OXYHGB MFR BLD: 92 % (ref 92–100)
OXYHGB MFR BLD: 92 % (ref 92–100)
OXYHGB MFR BLD: 94 % (ref 92–100)
OXYHGB MFR BLD: 95 % (ref 92–100)
OXYHGB MFR BLD: 97 % (ref 92–100)
OXYHGB MFR BLD: 98 % (ref 92–100)
OXYHGB MFR BLDA: 99 % (ref 75–100)
OXYHGB MFR BLDV: 81 %
PCO2 BLD: 35 MM HG (ref 35–45)
PCO2 BLD: 36 MM HG (ref 35–45)
PCO2 BLD: 36 MM HG (ref 35–45)
PCO2 BLD: 37 MM HG (ref 35–45)
PCO2 BLD: 39 MM HG (ref 35–45)
PCO2 BLD: 41 MM HG (ref 35–45)
PCO2 BLD: 41 MM HG (ref 35–45)
PCO2 BLD: 44 MM HG (ref 35–45)
PCO2 BLD: 45 MM HG (ref 35–45)
PCO2 BLDA: 42 MM HG (ref 35–45)
PCO2 BLDV: 45 MM HG (ref 40–50)
PH BLD: 7.36 PH (ref 7.35–7.45)
PH BLD: 7.39 PH (ref 7.35–7.45)
PH BLD: 7.39 PH (ref 7.35–7.45)
PH BLD: 7.4 PH (ref 7.35–7.45)
PH BLD: 7.41 PH (ref 7.35–7.45)
PH BLD: 7.43 PH (ref 7.35–7.45)
PH BLDA: 7.35 PH (ref 7.35–7.45)
PH BLDV: 7.34 PH (ref 7.32–7.43)
PHOSPHATE SERPL-MCNC: 2.7 MG/DL (ref 2.5–4.5)
PLATELET # BLD AUTO: 106 10E9/L (ref 150–450)
PLATELET # BLD AUTO: 118 10E9/L (ref 150–450)
PLATELET # BLD AUTO: 121 10E9/L (ref 150–450)
PLATELET # BLD AUTO: 139 10E9/L (ref 150–450)
PO2 BLD: 106 MM HG (ref 80–105)
PO2 BLD: 110 MM HG (ref 80–105)
PO2 BLD: 114 MM HG (ref 80–105)
PO2 BLD: 115 MM HG (ref 80–105)
PO2 BLD: 126 MM HG (ref 80–105)
PO2 BLD: 137 MM HG (ref 80–105)
PO2 BLD: 67 MM HG (ref 80–105)
PO2 BLD: 68 MM HG (ref 80–105)
PO2 BLD: 69 MM HG (ref 80–105)
PO2 BLD: 78 MM HG (ref 80–105)
PO2 BLD: 80 MM HG (ref 80–105)
PO2 BLDA: 452 MM HG (ref 80–105)
PO2 BLDV: 51 MM HG (ref 25–47)
POTASSIUM SERPL-SCNC: 3.5 MMOL/L (ref 3.4–5.3)
POTASSIUM SERPL-SCNC: 3.7 MMOL/L (ref 3.4–5.3)
POTASSIUM SERPL-SCNC: 3.7 MMOL/L (ref 3.4–5.3)
POTASSIUM SERPL-SCNC: 4 MMOL/L (ref 3.4–5.3)
PROCALCITONIN SERPL-MCNC: 22.09 NG/ML
PROT SERPL-MCNC: 5 G/DL (ref 6.8–8.8)
PROT SERPL-MCNC: 5.4 G/DL (ref 6.8–8.8)
PROT SERPL-MCNC: 5.7 G/DL (ref 6.8–8.8)
PROT SERPL-MCNC: 5.9 G/DL (ref 6.8–8.8)
R TIME UNTIL CLOT FORMS: 9.4 MINUTE (ref 5–10)
RBC # BLD AUTO: 2.63 10E12/L (ref 4.4–5.9)
RBC # BLD AUTO: 2.82 10E12/L (ref 4.4–5.9)
RBC # BLD AUTO: 2.89 10E12/L (ref 4.4–5.9)
RBC # BLD AUTO: 2.97 10E12/L (ref 4.4–5.9)
SODIUM SERPL-SCNC: 143 MMOL/L (ref 133–144)
SODIUM SERPL-SCNC: 144 MMOL/L (ref 133–144)
SODIUM SERPL-SCNC: 145 MMOL/L (ref 133–144)
SODIUM SERPL-SCNC: 145 MMOL/L (ref 133–144)
SPECIMEN SOURCE: NORMAL
TRANSFUSION STATUS PATIENT QL: NORMAL
TRANSFUSION STATUS PATIENT QL: NORMAL
TRIGL SERPL-MCNC: 389 MG/DL
TROPONIN I SERPL-MCNC: 122.07 UG/L (ref 0–0.04)
TROPONIN I SERPL-MCNC: 138.77 UG/L (ref 0–0.04)
TROPONIN I SERPL-MCNC: 173.13 UG/L (ref 0–0.04)
TROPONIN I SERPL-MCNC: 183.89 UG/L (ref 0–0.04)
WBC # BLD AUTO: 10.1 10E9/L (ref 4–11)
WBC # BLD AUTO: 11.7 10E9/L (ref 4–11)
WBC # BLD AUTO: 16.3 10E9/L (ref 4–11)
WBC # BLD AUTO: 8.9 10E9/L (ref 4–11)

## 2020-05-19 PROCEDURE — 33949 ECMO/ECLS DAILY MGMT ARTERY: CPT | Performed by: INTERNAL MEDICINE

## 2020-05-19 PROCEDURE — 87077 CULTURE AEROBIC IDENTIFY: CPT | Performed by: INTERNAL MEDICINE

## 2020-05-19 PROCEDURE — 83051 HEMOGLOBIN PLASMA: CPT

## 2020-05-19 PROCEDURE — 25800030 ZZH RX IP 258 OP 636: Performed by: INTERNAL MEDICINE

## 2020-05-19 PROCEDURE — 83615 LACTATE (LD) (LDH) ENZYME: CPT

## 2020-05-19 PROCEDURE — 93325 DOPPLER ECHO COLOR FLOW MAPG: CPT

## 2020-05-19 PROCEDURE — 84484 ASSAY OF TROPONIN QUANT: CPT

## 2020-05-19 PROCEDURE — 87040 BLOOD CULTURE FOR BACTERIA: CPT

## 2020-05-19 PROCEDURE — 85384 FIBRINOGEN ACTIVITY: CPT

## 2020-05-19 PROCEDURE — 40000986 XR CHEST PORT 1 VW

## 2020-05-19 PROCEDURE — 40000076 ZZH STATISTIC IABP MONITORING

## 2020-05-19 PROCEDURE — 94003 VENT MGMT INPAT SUBQ DAY: CPT

## 2020-05-19 PROCEDURE — 40000014 ZZH STATISTIC ARTERIAL MONITORING DAILY

## 2020-05-19 PROCEDURE — 25000125 ZZHC RX 250: Performed by: INTERNAL MEDICINE

## 2020-05-19 PROCEDURE — 5A1955Z RESPIRATORY VENTILATION, GREATER THAN 96 CONSECUTIVE HOURS: ICD-10-PCS | Performed by: THORACIC SURGERY (CARDIOTHORACIC VASCULAR SURGERY)

## 2020-05-19 PROCEDURE — 25000132 ZZH RX MED GY IP 250 OP 250 PS 637: Performed by: STUDENT IN AN ORGANIZED HEALTH CARE EDUCATION/TRAINING PROGRAM

## 2020-05-19 PROCEDURE — 83605 ASSAY OF LACTIC ACID: CPT | Performed by: INTERNAL MEDICINE

## 2020-05-19 PROCEDURE — 84478 ASSAY OF TRIGLYCERIDES: CPT

## 2020-05-19 PROCEDURE — 80053 COMPREHEN METABOLIC PANEL: CPT

## 2020-05-19 PROCEDURE — 36000074 ZZH SURGERY LEVEL 6 1ST 30 MIN - UMMC: Performed by: THORACIC SURGERY (CARDIOTHORACIC VASCULAR SURGERY)

## 2020-05-19 PROCEDURE — 25000128 H RX IP 250 OP 636: Performed by: THORACIC SURGERY (CARDIOTHORACIC VASCULAR SURGERY)

## 2020-05-19 PROCEDURE — 87186 SC STD MICRODIL/AGAR DIL: CPT | Performed by: INTERNAL MEDICINE

## 2020-05-19 PROCEDURE — 25000128 H RX IP 250 OP 636: Performed by: NURSE ANESTHETIST, CERTIFIED REGISTERED

## 2020-05-19 PROCEDURE — 85396 CLOTTING ASSAY WHOLE BLOOD: CPT | Performed by: INTERNAL MEDICINE

## 2020-05-19 PROCEDURE — 85347 COAGULATION TIME ACTIVATED: CPT

## 2020-05-19 PROCEDURE — C9113 INJ PANTOPRAZOLE SODIUM, VIA: HCPCS | Performed by: INTERNAL MEDICINE

## 2020-05-19 PROCEDURE — 04PY03Z REMOVAL OF INFUSION DEVICE FROM LOWER ARTERY, OPEN APPROACH: ICD-10-PCS | Performed by: THORACIC SURGERY (CARDIOTHORACIC VASCULAR SURGERY)

## 2020-05-19 PROCEDURE — 82330 ASSAY OF CALCIUM: CPT

## 2020-05-19 PROCEDURE — 27210437 ZZH NUTRITION PRODUCT SEMIELEM INTERMED LITER

## 2020-05-19 PROCEDURE — 93005 ELECTROCARDIOGRAM TRACING: CPT

## 2020-05-19 PROCEDURE — 82805 BLOOD GASES W/O2 SATURATION: CPT

## 2020-05-19 PROCEDURE — 27211402 ZZH SENSOR NIRS OXIMETER, ADULT

## 2020-05-19 PROCEDURE — 82330 ASSAY OF CALCIUM: CPT | Performed by: INTERNAL MEDICINE

## 2020-05-19 PROCEDURE — 40000275 ZZH STATISTIC RCP TIME EA 10 MIN

## 2020-05-19 PROCEDURE — 93308 TTE F-UP OR LMTD: CPT | Mod: 26 | Performed by: INTERNAL MEDICINE

## 2020-05-19 PROCEDURE — 37000008 ZZH ANESTHESIA TECHNICAL FEE, 1ST 30 MIN: Performed by: THORACIC SURGERY (CARDIOTHORACIC VASCULAR SURGERY)

## 2020-05-19 PROCEDURE — 87070 CULTURE OTHR SPECIMN AEROBIC: CPT | Performed by: INTERNAL MEDICINE

## 2020-05-19 PROCEDURE — 25000128 H RX IP 250 OP 636: Performed by: INTERNAL MEDICINE

## 2020-05-19 PROCEDURE — 84478 ASSAY OF TRIGLYCERIDES: CPT | Performed by: NURSE PRACTITIONER

## 2020-05-19 PROCEDURE — 83735 ASSAY OF MAGNESIUM: CPT

## 2020-05-19 PROCEDURE — 84145 PROCALCITONIN (PCT): CPT

## 2020-05-19 PROCEDURE — 85300 ANTITHROMBIN III ACTIVITY: CPT

## 2020-05-19 PROCEDURE — 33949 ECMO/ECLS DAILY MGMT ARTERY: CPT

## 2020-05-19 PROCEDURE — 85379 FIBRIN DEGRADATION QUANT: CPT

## 2020-05-19 PROCEDURE — 85027 COMPLETE CBC AUTOMATED: CPT

## 2020-05-19 PROCEDURE — 25800030 ZZH RX IP 258 OP 636

## 2020-05-19 PROCEDURE — 25000132 ZZH RX MED GY IP 250 OP 250 PS 637: Performed by: INTERNAL MEDICINE

## 2020-05-19 PROCEDURE — 36000076 ZZH SURGERY LEVEL 6 EA 15 ADDTL MIN - UMMC: Performed by: THORACIC SURGERY (CARDIOTHORACIC VASCULAR SURGERY)

## 2020-05-19 PROCEDURE — 82947 ASSAY GLUCOSE BLOOD QUANT: CPT | Performed by: INTERNAL MEDICINE

## 2020-05-19 PROCEDURE — 85652 RBC SED RATE AUTOMATED: CPT

## 2020-05-19 PROCEDURE — 71045 X-RAY EXAM CHEST 1 VIEW: CPT

## 2020-05-19 PROCEDURE — 93010 ELECTROCARDIOGRAM REPORT: CPT | Mod: 76 | Performed by: INTERNAL MEDICINE

## 2020-05-19 PROCEDURE — 84100 ASSAY OF PHOSPHORUS: CPT

## 2020-05-19 PROCEDURE — 37000009 ZZH ANESTHESIA TECHNICAL FEE, EACH ADDTL 15 MIN: Performed by: THORACIC SURGERY (CARDIOTHORACIC VASCULAR SURGERY)

## 2020-05-19 PROCEDURE — 27210794 ZZH OR GENERAL SUPPLY STERILE: Performed by: THORACIC SURGERY (CARDIOTHORACIC VASCULAR SURGERY)

## 2020-05-19 PROCEDURE — 25000128 H RX IP 250 OP 636: Performed by: NURSE PRACTITIONER

## 2020-05-19 PROCEDURE — 82805 BLOOD GASES W/O2 SATURATION: CPT | Performed by: INTERNAL MEDICINE

## 2020-05-19 PROCEDURE — 86140 C-REACTIVE PROTEIN: CPT

## 2020-05-19 PROCEDURE — 82947 ASSAY GLUCOSE BLOOD QUANT: CPT

## 2020-05-19 PROCEDURE — 85520 HEPARIN ASSAY: CPT

## 2020-05-19 PROCEDURE — 00000146 ZZHCL STATISTIC GLUCOSE BY METER IP

## 2020-05-19 PROCEDURE — 20000004 ZZH R&B ICU UMMC

## 2020-05-19 PROCEDURE — 85730 THROMBOPLASTIN TIME PARTIAL: CPT

## 2020-05-19 PROCEDURE — 83605 ASSAY OF LACTIC ACID: CPT

## 2020-05-19 PROCEDURE — 85610 PROTHROMBIN TIME: CPT

## 2020-05-19 PROCEDURE — 25000132 ZZH RX MED GY IP 250 OP 250 PS 637: Performed by: NURSE PRACTITIONER

## 2020-05-19 PROCEDURE — 25000125 ZZHC RX 250: Performed by: THORACIC SURGERY (CARDIOTHORACIC VASCULAR SURGERY)

## 2020-05-19 PROCEDURE — 99291 CRITICAL CARE FIRST HOUR: CPT | Mod: 25 | Performed by: INTERNAL MEDICINE

## 2020-05-19 PROCEDURE — 25000128 H RX IP 250 OP 636

## 2020-05-19 PROCEDURE — 93325 DOPPLER ECHO COLOR FLOW MAPG: CPT | Mod: 26 | Performed by: INTERNAL MEDICINE

## 2020-05-19 PROCEDURE — 93321 DOPPLER ECHO F-UP/LMTD STD: CPT | Mod: 26 | Performed by: INTERNAL MEDICINE

## 2020-05-19 RX ORDER — POTASSIUM CHLORIDE 750 MG/1
20-40 TABLET, EXTENDED RELEASE ORAL
Status: DISCONTINUED | OUTPATIENT
Start: 2020-05-19 | End: 2020-06-25 | Stop reason: HOSPADM

## 2020-05-19 RX ORDER — NOREPINEPHRINE BITARTRATE 0.06 MG/ML
0.03-0.4 INJECTION, SOLUTION INTRAVENOUS CONTINUOUS
Status: DISCONTINUED | OUTPATIENT
Start: 2020-05-19 | End: 2020-05-20 | Stop reason: CLARIF

## 2020-05-19 RX ORDER — POTASSIUM CHLORIDE 29.8 MG/ML
20 INJECTION INTRAVENOUS
Status: DISCONTINUED | OUTPATIENT
Start: 2020-05-19 | End: 2020-06-25 | Stop reason: HOSPADM

## 2020-05-19 RX ORDER — HEPARIN SODIUM 1000 [USP'U]/ML
INJECTION, SOLUTION INTRAVENOUS; SUBCUTANEOUS PRN
Status: DISCONTINUED | OUTPATIENT
Start: 2020-05-19 | End: 2020-05-19

## 2020-05-19 RX ORDER — NOREPINEPHRINE BITARTRATE 0.06 MG/ML
INJECTION, SOLUTION INTRAVENOUS
Status: DISCONTINUED
Start: 2020-05-19 | End: 2020-05-20 | Stop reason: HOSPADM

## 2020-05-19 RX ORDER — DEXTROSE MONOHYDRATE 100 MG/ML
INJECTION, SOLUTION INTRAVENOUS CONTINUOUS PRN
Status: DISCONTINUED | OUTPATIENT
Start: 2020-05-19 | End: 2020-06-25 | Stop reason: HOSPADM

## 2020-05-19 RX ORDER — POTASSIUM CL/LIDO/0.9 % NACL 10MEQ/0.1L
10 INTRAVENOUS SOLUTION, PIGGYBACK (ML) INTRAVENOUS
Status: DISCONTINUED | OUTPATIENT
Start: 2020-05-19 | End: 2020-06-25 | Stop reason: HOSPADM

## 2020-05-19 RX ORDER — POTASSIUM CHLORIDE 1.5 G/1.58G
20-40 POWDER, FOR SOLUTION ORAL
Status: DISCONTINUED | OUTPATIENT
Start: 2020-05-19 | End: 2020-06-25 | Stop reason: HOSPADM

## 2020-05-19 RX ORDER — FUROSEMIDE 10 MG/ML
40 INJECTION INTRAMUSCULAR; INTRAVENOUS ONCE
Status: COMPLETED | OUTPATIENT
Start: 2020-05-19 | End: 2020-05-19

## 2020-05-19 RX ORDER — PROTAMINE SULFATE 10 MG/ML
INJECTION, SOLUTION INTRAVENOUS PRN
Status: DISCONTINUED | OUTPATIENT
Start: 2020-05-19 | End: 2020-05-19

## 2020-05-19 RX ORDER — POTASSIUM CHLORIDE 7.45 MG/ML
10 INJECTION INTRAVENOUS
Status: DISCONTINUED | OUTPATIENT
Start: 2020-05-19 | End: 2020-06-25 | Stop reason: HOSPADM

## 2020-05-19 RX ADMIN — Medication 100 MCG/HR: at 16:27

## 2020-05-19 RX ADMIN — Medication 1 G: at 22:51

## 2020-05-19 RX ADMIN — MIDAZOLAM 3 MG: 1 INJECTION INTRAMUSCULAR; INTRAVENOUS at 01:00

## 2020-05-19 RX ADMIN — TICAGRELOR 90 MG: 90 TABLET ORAL at 20:16

## 2020-05-19 RX ADMIN — SODIUM CHLORIDE 500 ML: 9 INJECTION, SOLUTION INTRAVENOUS at 22:01

## 2020-05-19 RX ADMIN — Medication 50 MCG: at 20:00

## 2020-05-19 RX ADMIN — PANTOPRAZOLE SODIUM 40 MG: 40 INJECTION, POWDER, FOR SOLUTION INTRAVENOUS at 08:10

## 2020-05-19 RX ADMIN — POTASSIUM CHLORIDE 20 MEQ: 1.5 POWDER, FOR SOLUTION ORAL at 11:19

## 2020-05-19 RX ADMIN — HEPARIN SODIUM 5000 UNITS: 1000 INJECTION INTRAVENOUS; SUBCUTANEOUS at 18:27

## 2020-05-19 RX ADMIN — PIPERACILLIN AND TAZOBACTAM 3.38 G: 3; .375 INJECTION, POWDER, FOR SOLUTION INTRAVENOUS at 06:28

## 2020-05-19 RX ADMIN — Medication 50 MCG: at 10:30

## 2020-05-19 RX ADMIN — PROPOFOL 30 MCG/KG/MIN: 10 INJECTION, EMULSION INTRAVENOUS at 19:59

## 2020-05-19 RX ADMIN — PROTAMINE SULFATE 50 MG: 10 INJECTION, SOLUTION INTRAVENOUS at 18:50

## 2020-05-19 RX ADMIN — SODIUM PHOSPHATE, MONOBASIC, MONOHYDRATE AND SODIUM PHOSPHATE, DIBASIC, ANHYDROUS 10 MMOL: 276; 142 INJECTION, SOLUTION INTRAVENOUS at 06:25

## 2020-05-19 RX ADMIN — PIPERACILLIN AND TAZOBACTAM 3.38 G: 3; .375 INJECTION, POWDER, FOR SOLUTION INTRAVENOUS at 12:00

## 2020-05-19 RX ADMIN — POTASSIUM CHLORIDE 20 MEQ: 1.5 POWDER, FOR SOLUTION ORAL at 20:16

## 2020-05-19 RX ADMIN — SODIUM CHLORIDE 1000 ML: 9 INJECTION, SOLUTION INTRAVENOUS at 03:16

## 2020-05-19 RX ADMIN — PIPERACILLIN AND TAZOBACTAM 3.38 G: 3; .375 INJECTION, POWDER, FOR SOLUTION INTRAVENOUS at 00:41

## 2020-05-19 RX ADMIN — PROPOFOL 50 MCG/KG/MIN: 10 INJECTION, EMULSION INTRAVENOUS at 02:45

## 2020-05-19 RX ADMIN — TICAGRELOR 90 MG: 90 TABLET ORAL at 08:10

## 2020-05-19 RX ADMIN — PIPERACILLIN AND TAZOBACTAM 3.38 G: 3; .375 INJECTION, POWDER, FOR SOLUTION INTRAVENOUS at 18:51

## 2020-05-19 RX ADMIN — Medication 50 MCG: at 11:15

## 2020-05-19 RX ADMIN — Medication 50 MCG: at 12:07

## 2020-05-19 RX ADMIN — PROPOFOL 20 MCG/KG/MIN: 10 INJECTION, EMULSION INTRAVENOUS at 06:48

## 2020-05-19 RX ADMIN — PROPOFOL 30 MCG/KG/MIN: 10 INJECTION, EMULSION INTRAVENOUS at 23:09

## 2020-05-19 RX ADMIN — SODIUM CHLORIDE 1000 ML: 9 INJECTION, SOLUTION INTRAVENOUS at 03:17

## 2020-05-19 RX ADMIN — VANCOMYCIN HYDROCHLORIDE 1750 MG: 10 INJECTION, POWDER, LYOPHILIZED, FOR SOLUTION INTRAVENOUS at 19:28

## 2020-05-19 RX ADMIN — FUROSEMIDE 40 MG: 10 INJECTION, SOLUTION INTRAMUSCULAR; INTRAVENOUS at 10:16

## 2020-05-19 RX ADMIN — Medication 50 MCG: at 22:20

## 2020-05-19 RX ADMIN — PANTOPRAZOLE SODIUM 40 MG: 40 INJECTION, POWDER, FOR SOLUTION INTRAVENOUS at 20:03

## 2020-05-19 RX ADMIN — Medication 50 MCG: at 21:30

## 2020-05-19 RX ADMIN — ASPIRIN 81 MG CHEWABLE TABLET 81 MG: 81 TABLET CHEWABLE at 08:10

## 2020-05-19 ASSESSMENT — ACTIVITIES OF DAILY LIVING (ADL)
ADLS_ACUITY_SCORE: 22

## 2020-05-19 ASSESSMENT — MIFFLIN-ST. JEOR: SCORE: 1907.75

## 2020-05-19 NOTE — PROCEDURES
Procedure Date: 05/18/2020      EEG #-2       DATE OF RECORDING/SERVICE DATE:  Video EEG day 2 on 05/18/2020       SOURCE FILE DURATION:  23 hours and 47 minutes.       PATIENT INFORMATION:  A 39-year-old male was admitted after cardiac arrest.  EEG is being done to evaluate for seizures.      MEDICATIONS:  On this day, the patient is on fentanyl 100 mcg per hour, midazolam 4 mg per hour, propofol 20 mcg/kg per minute and rewarming was started at 1600 on this day.     TECHNICAL SUMMARY: This video EEG monitoring procedure was performed with 23 scalp electrodes in 10-20 system placements, and additional scalp, precordial and other surface electrodes used for electrical referencing and artifact detection. Video was reviewed intermittently by EEG technologist and physician for electroclinical seizures.      EEG FINDINGS:  Prior to rewarming, patient had had low voltage delta and theta activity.  Electrocerebral potentials were less than 12 microvolts and there were prolonged segments of EEG attenuation with no electrocerebral potentials above 5 microvolts as a burst of delta-theta slowing was noted in less than 25% of the record.  After patient rewarming began, electrocerebral potentials increased in amplitude up to 45 microvolts.  Good examples of this is at 2200 and there is less EEG attenuation also.  No parieto-occipital rhythm is noted or sleep architecture is noted as patient is being rewarmed.      ACTIVATION:  Photic stimulation was not done.  The patient was given auditory and sensory stimuli and EEG was reactive.      EPILEPTIFORM DISCHARGES:  None.      ICTAL:  None.      IMPRESSION:  Video EEG day 2 is abnormal due to the presence of diffuse generalized delta-theta slowing with periods of EEG attenuation consistent with a severe encephalopathy in the setting of hypothermia and anesthetic drip medications.  On this day, rewarming began at 1600 and EEG became less encephalopathic as patient is  rewarmed.  No electrographic seizures or epileptiform discharges were seen in this file.         DB GREGORY MD             D: 2020   T: 2020   MT: MARTIN      Name:     FAINA FORRESTER   MRN:      [id number]        Account:        [id number]   :      1980           Procedure Date: 2020      Document: [id number]

## 2020-05-19 NOTE — PLAN OF CARE
Major Shift Events:  Cisatricurium weaned off with warming overnight.  Attempted to wean sedation, however patient with violent coughing causing loss of ECMO flow multiple times. Patient then re-sedated.  Not following commands yet.  FiO2 on vent increased to 50% 2/2 decreased PaOs when patient was hypertensive for possible mixing cloud.  On/Off Nipride overnight for MAP >90.  2000 ml Normal Saline given overnight for tachycardia and increased lactic acid.   Plan: Possible turn down today.  Decrease sedation as able.    For vital signs and complete assessments, please see documentation flowsheets.

## 2020-05-19 NOTE — PROGRESS NOTES
Cardiology Progress Note  Scot Bishop MRN: [id number]  Age: 39 year old, : 1980  Date: 2020            Assessment and Plan:     Scot Bishop is a 39 year old male who was admitted on 2020 for cardiac arrest. Pt reportedly had chest pain that started on 20. He was using Drano on his pipes at home and did not initially seek medical attention for CP and SOB since it said on the box that Drano can cause those symptoms. He took a shower and had syncopal episode after coming out of the shower. EMS was called; initial rhythm asystole. With chest compressions pt eventually had PEA and then eventually had VF in the field. After multiple cycles of epinephrine had ROSC. He was intubated in the field.   Pt was brought to [facility] ED where he regained a pulse and was brought to the Cath Lab for coronary angiogram. Initially, BP was 90s/60s as he was being transported from ED but he had recurrent cardiac arrest on arrival to Cath Lab. ECG showed ST elevation in aVR. He was promptly placed on VA ECMO. He had thrombotic occlusion of proximal LAD and underwent successful aspiration thrombectomy and PCI w/ NAZIA of proximal and mid LAD.     Interval events: Started rewarming at 1600 yesterday, reached 37 degrees at ~0700. Cis discontinued w/ rewarming. Received 2L NS overnight for tachycardia and rising lactic acid. Nipride intermittently for MAPs >90, off this morning. Lactic down to 2.3 this AM.     Today's plan:   -echo w/ ECMO turndown   -discontinue versed   -wean sedation as able   -nutrition consult for TF   -lasix 40 mg IV this AM   -monitor I/O    Neurology: Intubated, sedated. Initial CT head negative. EEG this AM showed encephalopathy, no seizures. Cooled to 34 degrees on arrival; rewarmed this AM.   -continue fentanyl and propofol for sedation   -discontinue versed   -RASS goal -4 to -5    Cardiovascular / Hemodynamics: Refractory VF arrest    S/p NAZIA to proximal-mid  LAD  Peripheral VA ECMO inserted for cardiac arrest. LA 16.   TTE 5/18/20: EF <30% on VA ECMO 3.7L   EKG: SR/ST   VA ECMO: 4.15/3500/2/80%  -echo today with ECMO turndown  -continue ASA 81mg and ticagrelor 90mg BID  -rewarmed  -MAP goal 65-90; nipride gtt PRN   -tachycardia suspect d/t underlying agitation as sedation is weaned off   -ACT goal 160-180 initially d/t bloody output from OG; this is stable now so will increase goal to 180-200   -hold statin for now given likely hepatic injury during arrest  -hold ACE/ARB for now given likely reduced renal fxn after arrest  -holding beta blocker given shock    Pulmonary: Acute hypoxic respiratory failure  COVID negative  Admission CT chest showed bilateral consolidations c/w aspiration PNA.   Vent settings: 15/400/8/40%  CXR: Mild pulm edema. ETT 4.4 cm above the chaim. Lines in stable position.   -vanc/zosyn for asp PNA   -wean vent as able  -daily CXR  -Q2h ABGs for now  -consider scheduled duonebs if signs of lung dz, currently PRN       GI and Nutrition: Shock liver  GIB, resolved   -monitor BID LFTs  -Nutrition consult for TF via OG    -bowel regimen   -GI Prophylaxis: Protonix BID for UGIB   Renal, Fluid and Electrolytes: Acute kidney injury   Cr trending up to 1.32 this AM. UOP non oliguric. Received 2L fluid bolus last night for tachycardia and rising LA.   -lasix 40 mg IV this AM   -monitor urine output  -maintain K>3 and Mg>2    Infectious Disease: Aspiration pneumonia  Leukocytosis, resolved  No signs of infection. Leukocytosis c/w arrest. Blood cultures collected - negative thus far.   -vancomycin/zosyn x5 days (5/17 - ) for asp PNA   -daily blood cultures  -monitor for signs of infection given cooling, lines, and leukocytosis   Hematology and Oncology: Receiving heparin for ECMO and ASA/ticagrelor for NAZIA. Hgb 8.7.   -cryo PRN fibrinogen < 200; FFP for INR >2  -Transfuse for Hgb<10  -heparin gtt for ECMO with ACT goal 180-200  -US LE w/ arterial duplex per  "ECMO protocol   -DVT PPX: Heparin as above   Endocrinology: No known medical history. BG elevated.  -insulin gtt  -f/u HgbA1c    Lines: R femoral arterial and venous ECMO cannulae May 17, 2020  L femoral IABP line May 17, 2020  R radial arterial line May 17, 2020  ETT May 17, 2020  Silva catheter May 17, 2020  OG tube May 17, 2020  Restraint: needed    Current lines are required for patient management       Family update by me today: Yes     Code Status: Full code     The pt was discussed and evaluated with Dr. Alanis, attending physician, who agrees with the assessment and plan above.     Kala Chiang, APRN CNP          Objective     Temp 98.1  F (36.7  C) (Esophageal)   Resp 15   Ht 1.676 m (5' 6\")   Wt 105.5 kg (232 lb 9.4 oz)   SpO2 100%   BMI 37.54 kg/m    Temp:  [93  F (33.9  C)-98.1  F (36.7  C)] 98.1  F (36.7  C)  Heart Rate:  [] 110  Resp:  [15-17] 15  MAP:  [69 mmHg-138 mmHg] 69 mmHg  Arterial Line BP: ()/() 96/46  FiO2 (%):  [40 %-50 %] 40 %  SpO2:  [94 %-100 %] 100 %  Wt Readings from Last 2 Encounters:   05/19/20 105.5 kg (232 lb 9.4 oz)     I/O last 3 completed shifts:  In: 2478.2 [I.V.:2298.2; NG/GT:180]  Out: 3755 [Urine:3255; Emesis/NG output:500]      GENERAL APPEARANCE: Intubated, sedated. NAD.  HEENT: No icterus, PERRL 2 mm, ETT in place, OG tube in place  CARDIOVASCULAR: regular rhythm, normal S1 and S2, no S3 or S4 and no murmur, click or rub. Normal PMI. Pulses dopplerable.  RESP: Coarse bilaterally. Mechanical ventilation.    GASTRO: Soft, bowel sounds hypoactive but present  GENITOURINARY: Silva in place.  EXTREMITIES: Warm, +1 edema, pulses dopplered, as above. VA ECMO cannulas in right groin, ThermoGard via left groin.   NEURO: Sedated and intubated, Pupils equal and reactive, 2 mm. Fent and propofol for sedation.  INTEGUMENTARY: No rashes. Cannula/Line sites CDI  LINES/TUBES/DRAINS: (noted below) VA ECMO Cannulas R groin, IABP and ThermoGard in L groin. R " radial Arterial line. ETT. OG. Silva Catheter.          Data:     Vent Settings:  Resp: 15 SpO2: 100 % O2 Device: Mechanical Ventilator      Arterial Blood Gas:   Recent Labs   Lab 05/19/20 0558 05/19/20 0415 05/19/20  0243 05/19/20  0014   PH 7.41 7.41 7.39 7.43   PCO2 36 35 36 37   PO2 126* 137* 110* 78*   HCO3 23 22 22 25   O2PER 50 50 50 50       Vitals:    05/17/20 1700 05/18/20 0400 05/19/20 0000   Weight: 106 kg (233 lb 11 oz) 105.1 kg (231 lb 11.3 oz) 105.5 kg (232 lb 9.4 oz)   I/O last 3 completed shifts:  In: 2478.2 [I.V.:2298.2; NG/GT:180]  Out: 3755 [Urine:3255; Emesis/NG output:500]  Recent Labs   Lab 05/19/20 0558 05/19/20 0415 05/18/20 2201   NA  --  145*  --  145*   POTASSIUM  --  3.7  --  3.6   CHLORIDE  --  116*  --  113*   CO2  --  21  --  24   ANIONGAP  --  8  --  8   * 105*  118*   < > 118*  120*   BUN  --  24  --  21   CR  --  1.28*  --  1.12   TEN  --  7.2*  --  7.4*    < > = values in this interval not displayed.     No components found for: URINE   Recent Labs   Lab 05/19/20 0415 05/18/20 2201 05/18/20  1606   * 868* 1,091*   * 705* 795*   BILITOTAL 0.6 0.8 0.7   ALBUMIN 2.6* 3.0* 3.2*   PROTTOTAL 5.0* 5.6* 6.0*   ALKPHOS 38* 44 46     Temp: 98.1  F (36.7  C) Temp src: EsophagealTemp  Min: 93  F (33.9  C)  Max: 98.1  F (36.7  C)   Recent Labs   Lab 05/19/20 0415 05/18/20 2201 05/18/20  1606 05/18/20  0955 05/18/20  0353   WBC 8.9 10.0 12.3* 15.5* 15.8*   HGB 8.7* 10.3* 10.8* 11.5* 11.4*   HCT 26.3* 29.3* 31.8* 34.0* 33.7*   MCV 91 89 90 90 89   RDW 13.2 13.1 13.0 12.9 12.8   * 124* 147* 176 173     Recent Labs   Lab 05/19/20  0415 05/18/20  2201 05/18/20  1606 05/18/20  0955 05/18/20  0353   INR 1.40* 1.29* 1.24* 1.24* 1.33*   PTT 71* 79* 90* 92* 68*     Recent Labs   Lab 05/19/20  0558 05/19/20  0415 05/19/20  0243 05/19/20  0014 05/18/20  2201   * 105*  118* 127* 129* 118*  120*       All imaging personally reviewed:  Recent Results (from  the past 24 hour(s))   Echocardiogram Complete    Narrative    [id number]  [id number]  [id number]  [id number]^GRIS^YOSHI           [facility],[place]  Echocardiography Laboratory  [address] [place] [id number]     Name: FAINA FORRESTER  MRN: [id number]  : 1980  Study Date: 2020 08:16 AM  Age: 39 yrs  Gender: Male  Patient Location: [facility]  Reason For Study: Cardiac Arrest  Ordering Physician: YOSHI LANDRY  Performed By: Denisse Johnson RDCS     BSA: 2.1 m2  Height: 66 in  Weight: 231 lb  BP: 132/58 mmHg  _____________________________________________________________________________  __        Procedure  Complete Portable Echo Adult. Technically difficult study.Extremely poor  acoustic windows.  _____________________________________________________________________________  __        Interpretation Summary  VA ECMO at 3.7L/min. Technically difficult study. Extremely poor acoustic  windows.  Severely (EF <30%) reduced left ventricular function on extremely limited  views.  The right ventricle cannot be assessed.  No pericardial effusion is present.  Previous study not available for comparison.  _____________________________________________________________________________  __        Left Ventricle  Left ventricular wall thickness cannot evaluate. Left ventricular size is  normal. Severely (EF <30%) reduced left ventricular function is present. Left  ventricular diastolic function is not assessable. Severe diffuse hypokinesis  is present.     Right Ventricle  The right ventricle cannot be assessed. Right ventricular function cannot be  assessed due to poor image quality.     Mitral Valve  The mitral valve cannot be assessed.        Aortic Valve  The aortic valve opens with each cardiac cycle. On Doppler interrogation,  there is no significant stenosis or regurgitation.     Tricuspid Valve  The tricuspid valve cannot be assessed. Pulmonary artery systolic pressure  cannot be  assessed.     Pulmonic Valve  The pulmonic valve cannot be assessed.     Vessels  The aorta root cannot be assessed. The thoracic aorta cannot be assessed.  Venous ECMO cannula is visualized traversing the IVC.     Pericardium  No pericardial effusion is present.     Compared to Previous Study  Previous study not available for comparison.     _____________________________________________________________________________  __                       Report approved by: Ashlee Izquierdo 05/18/2020 09:18 AM                 _____________________________________________________________________________  __                Medications     Current Facility-Administered Medications   Medication     artificial tears ophthalmic ointment     aspirin (ASA) chewable tablet 81 mg     calcium chloride in  mL intermittent infusion 1 g     calcium chloride injection 1 g     cisatracurium (NIMBEX) 200 mg in D5W 100 mL infusion     dextrose 10% infusion     glucose gel 15-30 g    Or     dextrose 50 % injection 25-50 mL    Or     glucagon injection 1 mg     EPINEPHrine (ADRENALIN) 5 mg in sodium chloride 0.9 % 250 mL infusion     fentaNYL (SUBLIMAZE) bolus from infusion pump-ADULT 50 mcg     fentaNYL (SUBLIMAZE) infusion     heparin 2 unit/mL in 0.9% NaCl (for REPERFUSION CATHETER)     heparin 25,000 units in 0.45% NaCl 250 mL ANTICOAGULANT  infusion     insulin 1 unit/mL in saline (NovoLIN, HumuLIN Regular) drip - ADULT IV Infusion     ipratropium - albuterol 0.5 mg/2.5 mg/3 mL (DUONEB) neb solution 3 mL     lidocaine (LMX4) cream     lidocaine 1 % 0.1-1 mL     magnesium sulfate 2 g in water intermittent infusion     magnesium sulfate 4 g in 100 mL sterile water (premade)     Medication Considerations - To maintain platelet inhibition after discontinuation of cangrelor (KENGREAL) [see admin instructions]     midazolam (VERSED) drip - ADULT 100 mg/100 mL in NS PRE-MIX     midazolam (VERSED) injection 1-3 mg     naloxone (NARCAN)  injection 0.1-0.4 mg     nitroPRUsside (NIPRIDE) 50 mg, sodium thiosulfate 500 mg in D5W 125 mL IV infusion (conc: 0.4mg/mL)     norepinephrine (LEVOPHED) 16 mg in  mL infusion     pantoprazole (PROTONIX) 40 mg IV push injection     phenylephrine (HERBERTH-SYNEPHRINE) 50 mg in sodium chloride 0.9 % 250 mL infusion     piperacillin-tazobactam (ZOSYN) 3.375 g vial to attach to  mL bag     potassium chloride 20 mEq in 50 mL intermittent infusion     propofol (DIPRIVAN) infusion     sodium chloride (PF) 0.9% PF flush 3 mL     sodium chloride (PF) 0.9% PF flush 3 mL     sodium phosphate 10 mmol in D5W intermittent infusion     sodium phosphate 15 mmol in D5W intermittent infusion     sodium phosphate 20 mmol in D5W intermittent infusion     sodium phosphate 25 mmol in D5W intermittent infusion     ticagrelor (BRILINTA) tablet 90 mg     vancomycin (VANCOCIN) 1,750 mg in sodium chloride 0.9 % 250 mL intermittent infusion     vasopressin (VASOSTRICT) 40 Units in sodium chloride 0.9 % 40 mL infusion                  I have seen and examined the patient with the [facility] team. I agree with the assessment and plan of the note above.I have reviewed pertinent labs.     Jefferson Alanis MD  Interventional Cardiology  Pager: [phone]

## 2020-05-19 NOTE — PROVIDER NOTIFICATION
Patient tolerated bedside ECMO flow turn down well without incident.  See fellows note.  Svo2 dropped with flow drop to 1.0 lpm. No adverse reactions.

## 2020-05-19 NOTE — PROGRESS NOTES
ECMO [place] STUDY  5/19/2020    Inotropes/Pressors: none    Flow  MAP HR O2 Sat  MVO2  IABP  4L 89 123 100%  73  On  3L 88 125 100%  74  On  2L 84 127 99%  75  On  1L 79 130 97%  58  On    ABG @ 1 lpm: 7.41/41/67/26

## 2020-05-19 NOTE — PROGRESS NOTES
Preliminary Video EEG impression on May 18-19, 2020  Until 6am     Full report to follow. Please look in inpatient chart, under procedure section.     Patient had a cardiac arrest, underwent hypothermia treatment rewarming started 1600 on 5/18/2020, on sedative drips. EEG is abnormal due to the presences of diffuse attenuated EEG with rare burst of low voltage theta/delta activity. After rewarming, there is higher voltage (up to 40uV) burst of theta and delta slowing. There is no  parieto-occipital rhythm or well formed sleep architecture. EEG is reactive with stimulation.     This EEG is consistent with a profound encephalopathy in the setting of hypothermia and anesthetic drip medications. No epileptiform discharges or electrographic seizures were seen in this record. If events of interest are noted, please press the event button. Clinical correlation is advised.     Antonella Franklin MD  Staff Epilepsy Neurologist    [phone]

## 2020-05-19 NOTE — PROGRESS NOTES
Cardiology Critical Care Note - Cardiology  Jefferson Alanis M.D.  Critical Care Diagnoses:  Cardiogenic shock  Acute hypoxic respiratory failure  Pneumonia/ Septic shock  Acute renal failure  Rib fractures  Acute liver failure  Acute myocardial infarction of the LAD      Critical Care management intervention:  Discontinue Nipride  Stopped sic   Gave 40 mg iv lasix   Fentanyl of 100    propofol at 20  And versed at 2    Spent 40 minutes on critical care management on this patient.    .      Professor Zeke FUNES  Interventional Cariology and Cardiac Critical Care      ECMO Attending Progress Note  5/19/2020    Scot Bishop is a 39 year old male who was cannulated for ECMO for due to anterior STEMI followed by asystolic arrest - PEA - VF arrest/.    Interval events: rewarming today .  Improving     Physical Exam:  Temp:  [93  F (33.9  C)-99.1  F (37.3  C)] 99.1  F (37.3  C)  Heart Rate:  [] 133  Resp:  [15-17] 17  MAP:  [69 mmHg-147 mmHg] 88 mmHg  Arterial Line BP: ()/() 118/53  FiO2 (%):  [40 %] 40 %  SpO2:  [94 %-100 %] 98 %      Intake/Output Summary (Last 24 hours) at 5/19/2020 1240  Last data filed at 5/19/2020 1200  Gross per 24 hour   Intake 4857.34 ml   Output 2945 ml   Net 1912.34 ml        Ventilation Mode: CMV/AC  (Continuous Mandatory Ventilation/ Assist Control)  FiO2 (%): 40 %  Rate Set (breaths/minute): 15 breaths/min  Tidal Volume Set (mL): 400 mL  PEEP (cm H2O): 8 cmH2O  Oxygen Concentration (%): 50 %  Resp: 17     General: no acute distress, intubated and sedated  HEENT: PERRLA, conjunctiva clear, without icterus or pallor, oropharyx clear  Cardiac: RRR nl S1S2   Lungs: crackles bilaterally anterior  Abdomen: soft, nontender, non distended, no hepatosplenomegaly or masses  Extremities: without edema or cyanosis; pulses are palpable ;   Skin: normal skin appearance without worrisome lesions.   Neurologic:intubated and sedated.    Labs:  Recent Labs   Lab 05/19/20  1056  20  0952 20  0748 20  0558   PH 7.41 7.43 7.41 7.41   PCO2 41 37 37 36   PO2 67* 115* 114* 126*   HCO3 26 24 24 23   O2PER 40 40 40 50      Recent Labs   Lab 20  0952 20  0415 20  2201 20  1606   WBC 10.1 8.9 10.0 12.3*   HGB 8.7* 8.7* 10.3* 10.8*     Creatinine   Date Value Ref Range Status   2020 1.32 (H) 0.66 - 1.25 mg/dL Final   2020 1.28 (H) 0.66 - 1.25 mg/dL Final   2020 1.12 0.66 - 1.25 mg/dL Final   2020 1.03 0.66 - 1.25 mg/dL Final       Arterial Cannula Austrian: 17  Venous Cannula Location: RFV  Blood Flow (Circuit) LPM: 3.75 LPM  Gas Flow  LPM: 2 LPM  Gas FiO2   %: 100 %  ACT  (seconds): 171 seconds  Blood Temp  (degrees C): 34.1 C  Pulse Oximetry  (SpO2%): 100 %  Arterial Pressure  mmH mmHg      ECMO Issues including assessments and plan on DOS 2020:  Neuro: Sedated for mechanical ventilation and ECMO.  NIRS stable  b/l  RASS goal: -4  CV: Cardiogenic shock.  Hemodynamically stable on nipride  Pulm: ECMO and Vent settings as above  FEN/Renal: Electrolytes stable w/ replacement protocols in place, Cr stable, UOP stable  Heme: ACT goal: 160-180, Hemoglobin stable .  Goals: if O2 sat >85% Hgb >8.  If O2 Sat <85% keep Hgb 10-12.  Minimal oozing around the ECMO cannulas.  ID: Receiving empiric antibiotics  Cannulae: Position is acceptable on exam and the available imaging.  Distal perfusion cannula is in place and patent.     I have personally reviewed the ECMO flows, oxygenation and CO2 clearance, anticoagulation, and cannula position.  I have also personally assessed the patient's systemic response with hemodynamics, oxygenation, ventilation, and bleeding.       The patient requires continued ECMO support and management in the ICU.  I have discussed patient care and treatment plan with the primary team.      I have seen and examined the patient with the CSI team.I have reviewed pertinent labs. Decannulate  today    Jefferson  Zeke FUNES  Interventional Cardiology  Pager: [phone]      May 19, 2020

## 2020-05-19 NOTE — PROGRESS NOTES
ECMO Shift Summary:      ECMO Equipment:  Console serial number: [id number]  Circuit Lot number: [id number]  Oxygenator Lot number: [id number]        Patient remains on VA ECMO, all equipment is functioning and alarms are appropriately set. RPM's 3500 with flow range 3.79-3.91 L/min. Sweep gas is at 2 LPM and FiO2 80%. Circuit remains free of air and clot ,but fibrin is appreciated at connector sites. Cannulas are secure with slight oozing with dried drainage from site. Extremities are pale, cool to touch even while being rewarmed. Auto diuresing.    Significant Shift Events: Rewarming phase started at 1600 with goal of 37 C at 0400. With rewarming, patients HR up to 120s, blood pressures hypertensive; Nipride restarted, despite adequate sedation. Vagals down with suction, ECMO alarms. IABP still in place with pt's own pulsatility coming back. At 0200 lactate went up to 4.5 from 2.3.     Vent settings:  Ventilation Mode: CMV/AC  (Continuous Mandatory Ventilation/ Assist Control)  FiO2 (%): 40 %  Rate Set (breaths/minute): 15 breaths/min  Tidal Volume Set (mL): 400 mL  PEEP (cm H2O): 8 cmH2O  Oxygen Concentration (%): 40 %  Resp: 15  .    Heparin is running at 1000 units/hr, ACT range 162-179. Goal 160-180.    Urine output is large as RN charted, blood loss was none. Product given included 2L NS bolus for rising lactate.      Intake/Output Summary (Last 24 hours) at 5/19/2020 0020  Last data filed at 5/19/2020 0000  Gross per 24 hour   Intake 2304.83 ml   Output 3465 ml   Net -1160.17 ml       ECHO:  No results found for this or any previous visit.No results found for this or any previous visit.    CXR:  Recent Results (from the past 24 hour(s))   XR Chest Port 1 View    Narrative    XR CHEST PORT 1 VW  5/18/2020 1:48 AM    History:  Check endotracheal tube placement and ECLS cannula  placement. DO NOT log-roll patient.  Place film under patient using  patient safety handling process..     Comparison: Chest radiograph  dated 2020    Findings:   Supine portable AP chest radiograph. Endotracheal tube with the tip  projects 4.5 cm above chaim. Intra-aortic pump metallic clip at the  level of chaim, unchanged. Stable gastric tube and inferior ECMO  catheter. Temperature probe with the tip projects over mid esophagus.    Cardiac silhouette is at upper normal limit. Pulmonary vasculature is  relatively distinct. No significant change in bilateral interstitial  and airspace opacities. Low lung volumes. No pneumothorax or  significant pleural effusion.      Impression    IMPRESSION:  1.  Temperature probe is advanced with the tip projects over mid  esophagus. Otherwise stable supportive lines and tubes.  2.  No significant change in bilateral interstitial and airspace  opacities, which may represent pulmonary edema, atelectasis versus  infection.    I have personally reviewed the examination and initial interpretation  and I agree with the findings.    DARWIN BRUNO MD   Echocardiogram Complete    Narrative    [id number]  [id number]  [id number]  [id number]^GRIS^YOSHI           [facility],[place]  Echocardiography Laboratory  [address] [place] [id number]     Name: FAINA FORRESTER  MRN: [id number]  : 1980  Study Date: 2020 08:16 AM  Age: 39 yrs  Gender: Male  Patient Location: [facility]  Reason For Study: Cardiac Arrest  Ordering Physician: YOSHI LANDRY  Performed By: Denisse Johnson RDCS     BSA: 2.1 m2  Height: 66 in  Weight: 231 lb  BP: 132/58 mmHg  _____________________________________________________________________________  __        Procedure  Complete Portable Echo Adult. Technically difficult study.Extremely poor  acoustic windows.  _____________________________________________________________________________  __        Interpretation Summary  VA ECMO at 3.7L/min. Technically difficult study. Extremely poor acoustic  windows.  Severely (EF <30%) reduced left ventricular function on  extremely limited  views.  The right ventricle cannot be assessed.  No pericardial effusion is present.  Previous study not available for comparison.  _____________________________________________________________________________  __        Left Ventricle  Left ventricular wall thickness cannot evaluate. Left ventricular size is  normal. Severely (EF <30%) reduced left ventricular function is present. Left  ventricular diastolic function is not assessable. Severe diffuse hypokinesis  is present.     Right Ventricle  The right ventricle cannot be assessed. Right ventricular function cannot be  assessed due to poor image quality.     Mitral Valve  The mitral valve cannot be assessed.        Aortic Valve  The aortic valve opens with each cardiac cycle. On Doppler interrogation,  there is no significant stenosis or regurgitation.     Tricuspid Valve  The tricuspid valve cannot be assessed. Pulmonary artery systolic pressure  cannot be assessed.     Pulmonic Valve  The pulmonic valve cannot be assessed.     Vessels  The aorta root cannot be assessed. The thoracic aorta cannot be assessed.  Venous ECMO cannula is visualized traversing the IVC.     Pericardium  No pericardial effusion is present.     Compared to Previous Study  Previous study not available for comparison.     _____________________________________________________________________________  __                       Report approved by: Ashlee Izquierdo 05/18/2020 09:18 AM                 _____________________________________________________________________________  __          Labs:  Recent Labs   Lab 05/18/20 2201 05/18/20 1956 05/18/20  1801 05/18/20  1606   PH 7.44 7.43 7.41 7.37   PCO2 36 36 39 43   PO2 81 76* 113* 230*   HCO3 25 24 25 25   O2PER 40 40 40 40       Lab Results   Component Value Date    HGB 10.3 (L) 05/18/2020    PHGB 40 (H) 05/18/2020     (L) 05/18/2020    FIBR 332 05/18/2020    INR 1.29 (H) 05/18/2020    PTT 79 (H) 05/18/2020     DD 11.8 (H) 05/18/2020    AXA 0.26 05/18/2020    ANTCH 73 (L) 05/18/2020         Plan is remain on VA ECMO. Wean and support as tolerated.       Monica Hernandez RN  5/19/2020 12:20 AM

## 2020-05-19 NOTE — PROGRESS NOTES
"[facility], [place]   Neurology Daily Note  Scot Bishop  [id number]  05/19/2020    Subjective:  Rewarmed overnight.  Patient remains intubated and sedated.    Objective   Physical Examination   Vitals: Temp 98.4  F (36.9  C)   Resp 15   Ht 1.676 m (5' 6\")   Wt 105.5 kg (232 lb 9.4 oz)   SpO2 100%   BMI 37.54 kg/m    Gen: intubated, sedated  HEENT: intubated, no icterus  Lungs: mechanically ventilated  Ext: mild edema  Neuro: intubated, examined with fentanyl sedation in place. Opens eyes to noxious stimulation. Pupils are 2-3mm and equal and sluggishly reactive to light, eyes are conjugate. Cough reflex intact.  No abnormal movements noted.  No movements of upper extremities.  Minimal movements seen in lower extremities in response to painful stimuli.    Investigations    Recent Labs   Lab 05/19/20  0952 05/19/20  0558 05/19/20  0415  05/18/20  2201   WBC 10.1  --  8.9  --  10.0   HGB 8.7*  --  8.7*  --  10.3*   MCV 92  --  91  --  89   *  --  106*  --  124*   INR 1.29*  --  1.40*  --  1.29*   *  --  145*  --  145*   POTASSIUM 3.5  --  3.7  --  3.6   CHLORIDE 115*  --  116*  --  113*   CO2 23  --  21  --  24   BUN 23  --  24  --  21   CR 1.32*  --  1.28*  --  1.12   ANIONGAP 7  --  8  --  8   TEN 7.6*  --  7.2*  --  7.4*   *  127* 108* 105*  118*   < > 118*  120*   ALBUMIN 2.9*  --  2.6*  --  3.0*   PROTTOTAL 5.4*  --  5.0*  --  5.6*   BILITOTAL 0.5  --  0.6  --  0.8   ALKPHOS 39*  --  38*  --  44   *  --  592*  --  705*   *  --  630*  --  868*   TROPI 183.886*  --  173.128*  --  172.320*    < > = values in this interval not displayed.     CT head 5/17/2020:  Impression: No acute intracranial pathology.    EEG 5/18/2020:  \"IMPRESSION:  Video EEG day 2 is abnormal due to the presence of diffuse generalized delta-theta slowing with periods of EEG attenuation consistent with a severe encephalopathy in the setting of hypothermia and anesthetic " "drip medications.  On this day, patient rewarming began at 1600 and EEG became less encephalopathic as patient is rewarmed.  No electrographic seizures or epileptiform discharges were seen in this file. \"    Assessment and Plan   Scot Bishop is a 39 year old male with past medical history including back pain who presented 5/17/2020 with asystole cardiac arrest.  Neurologic exam is limited by sedation at this time. Initial CT head without evidence of anoxic injury. EEG consistent with encephalopathy.    Recommendations  - will follow CT head scheduled for 5/20  - Continue video EEG  - wean sedation as able  - neurocritical care service will continue to follow    Patient was seen and discussed with Dr. Fidelia Herrera MD  Neurology PGY-2  Ascom [id number]    "

## 2020-05-19 NOTE — PHARMACY-CONSULT NOTE
Pharmacy Tube Feeding Consult    Medication reviewed for administration by feeding tube and for potential food/drug interactions.    Recommendation: No changes are needed at this time.     Pharmacy will continue to follow as new medications are ordered.    Caroline Kitchen, PharmD

## 2020-05-20 ENCOUNTER — APPOINTMENT (OUTPATIENT)
Dept: CARDIOLOGY | Facility: CLINIC | Age: 40
End: 2020-05-20
Attending: NURSE PRACTITIONER
Payer: MEDICAID

## 2020-05-20 ENCOUNTER — APPOINTMENT (OUTPATIENT)
Dept: NEUROLOGY | Facility: CLINIC | Age: 40
End: 2020-05-20
Attending: PSYCHIATRY & NEUROLOGY
Payer: MEDICAID

## 2020-05-20 ENCOUNTER — APPOINTMENT (OUTPATIENT)
Dept: GENERAL RADIOLOGY | Facility: CLINIC | Age: 40
End: 2020-05-20
Attending: STUDENT IN AN ORGANIZED HEALTH CARE EDUCATION/TRAINING PROGRAM
Payer: MEDICAID

## 2020-05-20 ENCOUNTER — APPOINTMENT (OUTPATIENT)
Dept: ULTRASOUND IMAGING | Facility: CLINIC | Age: 40
End: 2020-05-20
Attending: STUDENT IN AN ORGANIZED HEALTH CARE EDUCATION/TRAINING PROGRAM
Payer: MEDICAID

## 2020-05-20 LAB
ALBUMIN SERPL-MCNC: 2.6 G/DL (ref 3.4–5)
ALP SERPL-CCNC: 44 U/L (ref 40–150)
ALT SERPL W P-5'-P-CCNC: 461 U/L (ref 0–70)
ANION GAP SERPL CALCULATED.3IONS-SCNC: 10 MMOL/L (ref 3–14)
ANION GAP SERPL CALCULATED.3IONS-SCNC: 7 MMOL/L (ref 3–14)
APTT PPP: 30 SEC (ref 22–37)
AST SERPL W P-5'-P-CCNC: 382 U/L (ref 0–45)
AT III ACT/NOR PPP CHRO: 60 % (ref 85–135)
BASE DEFICIT BLDA-SCNC: 0.3 MMOL/L
BASE DEFICIT BLDA-SCNC: 1 MMOL/L
BASE DEFICIT BLDA-SCNC: 1.5 MMOL/L
BASE DEFICIT BLDA-SCNC: 2 MMOL/L
BASE EXCESS BLDA CALC-SCNC: 1.9 MMOL/L
BASE EXCESS BLDA CALC-SCNC: 1.9 MMOL/L
BILIRUB SERPL-MCNC: 0.6 MG/DL (ref 0.2–1.3)
BLD PROD TYP BPU: NORMAL
BLD UNIT ID BPU: 0
BLOOD PRODUCT CODE: NORMAL
BPU ID: NORMAL
BUN SERPL-MCNC: 36 MG/DL (ref 7–30)
BUN SERPL-MCNC: 38 MG/DL (ref 7–30)
CA-I BLD-MCNC: 4.5 MG/DL (ref 4.4–5.2)
CA-I BLD-MCNC: 4.5 MG/DL (ref 4.4–5.2)
CALCIUM SERPL-MCNC: 7.6 MG/DL (ref 8.5–10.1)
CALCIUM SERPL-MCNC: 8 MG/DL (ref 8.5–10.1)
CHLORIDE SERPL-SCNC: 112 MMOL/L (ref 94–109)
CHLORIDE SERPL-SCNC: 114 MMOL/L (ref 94–109)
CO2 SERPL-SCNC: 23 MMOL/L (ref 20–32)
CO2 SERPL-SCNC: 23 MMOL/L (ref 20–32)
CREAT SERPL-MCNC: 1.63 MG/DL (ref 0.66–1.25)
CREAT SERPL-MCNC: 1.93 MG/DL (ref 0.66–1.25)
CRP SERPL-MCNC: 100 MG/L (ref 0–8)
D DIMER PPP FEU-MCNC: 2.8 UG/ML FEU (ref 0–0.5)
ERYTHROCYTE [DISTWIDTH] IN BLOOD BY AUTOMATED COUNT: 13.3 % (ref 10–15)
ERYTHROCYTE [DISTWIDTH] IN BLOOD BY AUTOMATED COUNT: 13.4 % (ref 10–15)
ERYTHROCYTE [SEDIMENTATION RATE] IN BLOOD BY WESTERGREN METHOD: 83 MM/H (ref 0–15)
FIBRINOGEN PPP-MCNC: 607 MG/DL (ref 200–420)
GFR SERPL CREATININE-BSD FRML MDRD: 42 ML/MIN/{1.73_M2}
GFR SERPL CREATININE-BSD FRML MDRD: 52 ML/MIN/{1.73_M2}
GLUCOSE BLD-MCNC: 130 MG/DL (ref 70–99)
GLUCOSE BLDC GLUCOMTR-MCNC: 112 MG/DL (ref 70–99)
GLUCOSE BLDC GLUCOMTR-MCNC: 117 MG/DL (ref 70–99)
GLUCOSE BLDC GLUCOMTR-MCNC: 126 MG/DL (ref 70–99)
GLUCOSE BLDC GLUCOMTR-MCNC: 129 MG/DL (ref 70–99)
GLUCOSE SERPL-MCNC: 123 MG/DL (ref 70–99)
GLUCOSE SERPL-MCNC: 130 MG/DL (ref 70–99)
HCO3 BLD-SCNC: 24 MMOL/L (ref 21–28)
HCO3 BLD-SCNC: 25 MMOL/L (ref 21–28)
HCO3 BLD-SCNC: 26 MMOL/L (ref 21–28)
HCO3 BLD-SCNC: 27 MMOL/L (ref 21–28)
HCT VFR BLD AUTO: 22.4 % (ref 40–53)
HCT VFR BLD AUTO: 24.7 % (ref 40–53)
HGB BLD-MCNC: 7.1 G/DL (ref 13.3–17.7)
HGB BLD-MCNC: 8.2 G/DL (ref 13.3–17.7)
HGB FREE PLAS-MCNC: 70 MG/DL
INR PPP: 1.29 (ref 0.86–1.14)
INTERPRETATION ECG - MUSE: NORMAL
KCT BLD-ACNC: 162 SEC (ref 75–150)
KCT BLD-ACNC: 166 SEC (ref 75–150)
KCT BLD-ACNC: 167 SEC (ref 75–150)
KCT BLD-ACNC: 171 SEC (ref 75–150)
KCT BLD-ACNC: 171 SEC (ref 75–150)
LACTATE BLD-SCNC: 1.4 MMOL/L (ref 0.7–2)
LACTATE BLD-SCNC: 2 MMOL/L (ref 0.7–2)
LACTATE BLD-SCNC: 2.8 MMOL/L (ref 0.7–2)
LDH SERPL L TO P-CCNC: 1181 U/L (ref 85–227)
LMWH PPP CHRO-ACNC: <0.1 IU/ML
MAGNESIUM SERPL-MCNC: 2.1 MG/DL (ref 1.6–2.3)
MAGNESIUM SERPL-MCNC: 2.2 MG/DL (ref 1.6–2.3)
MCH RBC QN AUTO: 29.6 PG (ref 26.5–33)
MCH RBC QN AUTO: 30.5 PG (ref 26.5–33)
MCHC RBC AUTO-ENTMCNC: 31.7 G/DL (ref 31.5–36.5)
MCHC RBC AUTO-ENTMCNC: 33.2 G/DL (ref 31.5–36.5)
MCV RBC AUTO: 92 FL (ref 78–100)
MCV RBC AUTO: 93 FL (ref 78–100)
O2/TOTAL GAS SETTING VFR VENT: 50 %
O2/TOTAL GAS SETTING VFR VENT: 60 %
O2/TOTAL GAS SETTING VFR VENT: 75 %
O2/TOTAL GAS SETTING VFR VENT: ABNORMAL %
OXYHGB MFR BLD: 92 % (ref 92–100)
OXYHGB MFR BLD: 93 % (ref 92–100)
OXYHGB MFR BLD: 93 % (ref 92–100)
OXYHGB MFR BLD: 94 % (ref 92–100)
OXYHGB MFR BLD: 96 % (ref 92–100)
OXYHGB MFR BLD: 97 % (ref 92–100)
PCO2 BLD: 40 MM HG (ref 35–45)
PCO2 BLD: 42 MM HG (ref 35–45)
PCO2 BLD: 43 MM HG (ref 35–45)
PCO2 BLD: 44 MM HG (ref 35–45)
PCO2 BLD: 45 MM HG (ref 35–45)
PCO2 BLD: 46 MM HG (ref 35–45)
PH BLD: 7.34 PH (ref 7.35–7.45)
PH BLD: 7.34 PH (ref 7.35–7.45)
PH BLD: 7.35 PH (ref 7.35–7.45)
PH BLD: 7.35 PH (ref 7.35–7.45)
PH BLD: 7.41 PH (ref 7.35–7.45)
PH BLD: 7.43 PH (ref 7.35–7.45)
PHOSPHATE SERPL-MCNC: 3.4 MG/DL (ref 2.5–4.5)
PHOSPHATE SERPL-MCNC: 4.6 MG/DL (ref 2.5–4.5)
PLATELET # BLD AUTO: 126 10E9/L (ref 150–450)
PLATELET # BLD AUTO: 132 10E9/L (ref 150–450)
PLATELET INHIBITION WITH AA: 100 %
PLATELET INHIBITION WITH ADP: 12 %
PO2 BLD: 115 MM HG (ref 80–105)
PO2 BLD: 134 MM HG (ref 80–105)
PO2 BLD: 70 MM HG (ref 80–105)
PO2 BLD: 72 MM HG (ref 80–105)
PO2 BLD: 73 MM HG (ref 80–105)
PO2 BLD: 81 MM HG (ref 80–105)
POTASSIUM SERPL-SCNC: 3.5 MMOL/L (ref 3.4–5.3)
POTASSIUM SERPL-SCNC: 4.4 MMOL/L (ref 3.4–5.3)
PROT SERPL-MCNC: 5.5 G/DL (ref 6.8–8.8)
RBC # BLD AUTO: 2.4 10E12/L (ref 4.4–5.9)
RBC # BLD AUTO: 2.69 10E12/L (ref 4.4–5.9)
SODIUM SERPL-SCNC: 144 MMOL/L (ref 133–144)
SODIUM SERPL-SCNC: 145 MMOL/L (ref 133–144)
TRANSFUSION STATUS PATIENT QL: NORMAL
TRANSFUSION STATUS PATIENT QL: NORMAL
TROPONIN I SERPL-MCNC: 102.3 UG/L (ref 0–0.04)
TROPONIN I SERPL-MCNC: 103.69 UG/L (ref 0–0.04)
TROPONIN I SERPL-MCNC: 65.06 UG/L (ref 0–0.04)
TSH SERPL DL<=0.005 MIU/L-ACNC: 1.01 MU/L (ref 0.4–4)
VANCOMYCIN SERPL-MCNC: 6.9 MG/L
WBC # BLD AUTO: 14.2 10E9/L (ref 4–11)
WBC # BLD AUTO: 16.9 10E9/L (ref 4–11)

## 2020-05-20 PROCEDURE — 93010 ELECTROCARDIOGRAM REPORT: CPT | Mod: 59 | Performed by: INTERNAL MEDICINE

## 2020-05-20 PROCEDURE — 86140 C-REACTIVE PROTEIN: CPT

## 2020-05-20 PROCEDURE — 40000275 ZZH STATISTIC RCP TIME EA 10 MIN

## 2020-05-20 PROCEDURE — P9016 RBC LEUKOCYTES REDUCED: HCPCS | Performed by: STUDENT IN AN ORGANIZED HEALTH CARE EDUCATION/TRAINING PROGRAM

## 2020-05-20 PROCEDURE — 86923 COMPATIBILITY TEST ELECTRIC: CPT | Performed by: STUDENT IN AN ORGANIZED HEALTH CARE EDUCATION/TRAINING PROGRAM

## 2020-05-20 PROCEDURE — 25800030 ZZH RX IP 258 OP 636

## 2020-05-20 PROCEDURE — 80053 COMPREHEN METABOLIC PANEL: CPT

## 2020-05-20 PROCEDURE — 25000128 H RX IP 250 OP 636: Performed by: INTERNAL MEDICINE

## 2020-05-20 PROCEDURE — 27210437 ZZH NUTRITION PRODUCT SEMIELEM INTERMED LITER

## 2020-05-20 PROCEDURE — 94003 VENT MGMT INPAT SUBQ DAY: CPT

## 2020-05-20 PROCEDURE — 83051 HEMOGLOBIN PLASMA: CPT

## 2020-05-20 PROCEDURE — 83735 ASSAY OF MAGNESIUM: CPT | Performed by: NURSE PRACTITIONER

## 2020-05-20 PROCEDURE — 20000004 ZZH R&B ICU UMMC

## 2020-05-20 PROCEDURE — 85384 FIBRINOGEN ACTIVITY: CPT

## 2020-05-20 PROCEDURE — 82805 BLOOD GASES W/O2 SATURATION: CPT | Performed by: INTERNAL MEDICINE

## 2020-05-20 PROCEDURE — 95715 VEEG EA 12-26HR INTMT MNTR: CPT

## 2020-05-20 PROCEDURE — 99207 ZZC APP CREDIT; MD BILLING SHARED VISIT: CPT | Performed by: NURSE PRACTITIONER

## 2020-05-20 PROCEDURE — 82330 ASSAY OF CALCIUM: CPT

## 2020-05-20 PROCEDURE — 99291 CRITICAL CARE FIRST HOUR: CPT | Performed by: INTERNAL MEDICINE

## 2020-05-20 PROCEDURE — 27211391 ZZ H KIT, 6 FR TL BIOFLO OPEN ENDED PICC

## 2020-05-20 PROCEDURE — 25000132 ZZH RX MED GY IP 250 OP 250 PS 637: Performed by: INTERNAL MEDICINE

## 2020-05-20 PROCEDURE — 40000556 ZZH STATISTIC PERIPHERAL IV START W US GUIDANCE

## 2020-05-20 PROCEDURE — 82805 BLOOD GASES W/O2 SATURATION: CPT

## 2020-05-20 PROCEDURE — 80202 ASSAY OF VANCOMYCIN: CPT

## 2020-05-20 PROCEDURE — 85300 ANTITHROMBIN III ACTIVITY: CPT

## 2020-05-20 PROCEDURE — 00000146 ZZHCL STATISTIC GLUCOSE BY METER IP

## 2020-05-20 PROCEDURE — 86901 BLOOD TYPING SEROLOGIC RH(D): CPT | Performed by: STUDENT IN AN ORGANIZED HEALTH CARE EDUCATION/TRAINING PROGRAM

## 2020-05-20 PROCEDURE — 80048 BASIC METABOLIC PNL TOTAL CA: CPT | Performed by: NURSE PRACTITIONER

## 2020-05-20 PROCEDURE — 25000128 H RX IP 250 OP 636

## 2020-05-20 PROCEDURE — 85379 FIBRIN DEGRADATION QUANT: CPT

## 2020-05-20 PROCEDURE — C9113 INJ PANTOPRAZOLE SODIUM, VIA: HCPCS | Performed by: INTERNAL MEDICINE

## 2020-05-20 PROCEDURE — 85027 COMPLETE CBC AUTOMATED: CPT | Performed by: NURSE PRACTITIONER

## 2020-05-20 PROCEDURE — 40000014 ZZH STATISTIC ARTERIAL MONITORING DAILY

## 2020-05-20 PROCEDURE — 85652 RBC SED RATE AUTOMATED: CPT

## 2020-05-20 PROCEDURE — 85027 COMPLETE CBC AUTOMATED: CPT

## 2020-05-20 PROCEDURE — 85610 PROTHROMBIN TIME: CPT

## 2020-05-20 PROCEDURE — 86900 BLOOD TYPING SEROLOGIC ABO: CPT | Performed by: STUDENT IN AN ORGANIZED HEALTH CARE EDUCATION/TRAINING PROGRAM

## 2020-05-20 PROCEDURE — 84443 ASSAY THYROID STIM HORMONE: CPT

## 2020-05-20 PROCEDURE — 83605 ASSAY OF LACTIC ACID: CPT | Performed by: INTERNAL MEDICINE

## 2020-05-20 PROCEDURE — 85730 THROMBOPLASTIN TIME PARTIAL: CPT

## 2020-05-20 PROCEDURE — 83615 LACTATE (LD) (LDH) ENZYME: CPT

## 2020-05-20 PROCEDURE — 87070 CULTURE OTHR SPECIMN AEROBIC: CPT | Performed by: INTERNAL MEDICINE

## 2020-05-20 PROCEDURE — 84100 ASSAY OF PHOSPHORUS: CPT

## 2020-05-20 PROCEDURE — 87077 CULTURE AEROBIC IDENTIFY: CPT | Performed by: INTERNAL MEDICINE

## 2020-05-20 PROCEDURE — 27211417 ZZ H KIT, VPS RHYTHM STYLET

## 2020-05-20 PROCEDURE — 84100 ASSAY OF PHOSPHORUS: CPT | Performed by: NURSE PRACTITIONER

## 2020-05-20 PROCEDURE — 93306 TTE W/DOPPLER COMPLETE: CPT | Mod: 26 | Performed by: INTERNAL MEDICINE

## 2020-05-20 PROCEDURE — 84484 ASSAY OF TROPONIN QUANT: CPT

## 2020-05-20 PROCEDURE — 25000132 ZZH RX MED GY IP 250 OP 250 PS 637: Performed by: STUDENT IN AN ORGANIZED HEALTH CARE EDUCATION/TRAINING PROGRAM

## 2020-05-20 PROCEDURE — 36415 COLL VENOUS BLD VENIPUNCTURE: CPT

## 2020-05-20 PROCEDURE — 40000986 XR CHEST PORT 1 VW

## 2020-05-20 PROCEDURE — 40000076 ZZH STATISTIC IABP MONITORING

## 2020-05-20 PROCEDURE — 85520 HEPARIN ASSAY: CPT

## 2020-05-20 PROCEDURE — 87186 SC STD MICRODIL/AGAR DIL: CPT | Performed by: INTERNAL MEDICINE

## 2020-05-20 PROCEDURE — 27211414 ZZ H ADHESIVE SKIN CLOSURE, DERMABOND

## 2020-05-20 PROCEDURE — 87040 BLOOD CULTURE FOR BACTERIA: CPT

## 2020-05-20 PROCEDURE — 99232 SBSQ HOSP IP/OBS MODERATE 35: CPT | Performed by: NURSE PRACTITIONER

## 2020-05-20 PROCEDURE — 86850 RBC ANTIBODY SCREEN: CPT | Performed by: STUDENT IN AN ORGANIZED HEALTH CARE EDUCATION/TRAINING PROGRAM

## 2020-05-20 PROCEDURE — 82947 ASSAY GLUCOSE BLOOD QUANT: CPT

## 2020-05-20 PROCEDURE — 83735 ASSAY OF MAGNESIUM: CPT

## 2020-05-20 PROCEDURE — 25800030 ZZH RX IP 258 OP 636: Performed by: INTERNAL MEDICINE

## 2020-05-20 PROCEDURE — 25000128 H RX IP 250 OP 636: Performed by: NURSE PRACTITIONER

## 2020-05-20 PROCEDURE — 25000132 ZZH RX MED GY IP 250 OP 250 PS 637: Performed by: NURSE PRACTITIONER

## 2020-05-20 PROCEDURE — 84484 ASSAY OF TROPONIN QUANT: CPT | Performed by: INTERNAL MEDICINE

## 2020-05-20 PROCEDURE — 93306 TTE W/DOPPLER COMPLETE: CPT

## 2020-05-20 PROCEDURE — 93971 EXTREMITY STUDY: CPT | Mod: LT

## 2020-05-20 PROCEDURE — 83605 ASSAY OF LACTIC ACID: CPT

## 2020-05-20 PROCEDURE — 84484 ASSAY OF TROPONIN QUANT: CPT | Performed by: NURSE PRACTITIONER

## 2020-05-20 PROCEDURE — 93005 ELECTROCARDIOGRAM TRACING: CPT

## 2020-05-20 RX ORDER — HEPARIN SODIUM,PORCINE 10 UNIT/ML
5-10 VIAL (ML) INTRAVENOUS EVERY 24 HOURS
Status: DISCONTINUED | OUTPATIENT
Start: 2020-05-20 | End: 2020-06-21 | Stop reason: CLARIF

## 2020-05-20 RX ORDER — HEPARIN SODIUM,PORCINE 10 UNIT/ML
5-10 VIAL (ML) INTRAVENOUS
Status: DISCONTINUED | OUTPATIENT
Start: 2020-05-20 | End: 2020-06-21 | Stop reason: CLARIF

## 2020-05-20 RX ORDER — HEPARIN SODIUM,PORCINE 10 UNIT/ML
2-5 VIAL (ML) INTRAVENOUS
Status: DISCONTINUED | OUTPATIENT
Start: 2020-05-20 | End: 2020-05-29

## 2020-05-20 RX ORDER — FUROSEMIDE 10 MG/ML
40 INJECTION INTRAMUSCULAR; INTRAVENOUS ONCE
Status: COMPLETED | OUTPATIENT
Start: 2020-05-20 | End: 2020-05-20

## 2020-05-20 RX ORDER — LIDOCAINE 40 MG/G
CREAM TOPICAL
Status: DISCONTINUED | OUTPATIENT
Start: 2020-05-20 | End: 2020-05-29

## 2020-05-20 RX ADMIN — MULTIVITAMIN 15 ML: LIQUID ORAL at 08:04

## 2020-05-20 RX ADMIN — PIPERACILLIN AND TAZOBACTAM 3.38 G: 3; .375 INJECTION, POWDER, FOR SOLUTION INTRAVENOUS at 17:44

## 2020-05-20 RX ADMIN — Medication 50 MCG: at 00:30

## 2020-05-20 RX ADMIN — PIPERACILLIN AND TAZOBACTAM 3.38 G: 3; .375 INJECTION, POWDER, FOR SOLUTION INTRAVENOUS at 00:09

## 2020-05-20 RX ADMIN — Medication: at 08:06

## 2020-05-20 RX ADMIN — TICAGRELOR 90 MG: 90 TABLET ORAL at 20:03

## 2020-05-20 RX ADMIN — TICAGRELOR 90 MG: 90 TABLET ORAL at 08:04

## 2020-05-20 RX ADMIN — PROPOFOL 30 MCG/KG/MIN: 10 INJECTION, EMULSION INTRAVENOUS at 19:15

## 2020-05-20 RX ADMIN — PIPERACILLIN AND TAZOBACTAM 3.38 G: 3; .375 INJECTION, POWDER, FOR SOLUTION INTRAVENOUS at 12:21

## 2020-05-20 RX ADMIN — FUROSEMIDE 40 MG: 10 INJECTION, SOLUTION INTRAMUSCULAR; INTRAVENOUS at 10:37

## 2020-05-20 RX ADMIN — PROPOFOL 20 MCG/KG/MIN: 10 INJECTION, EMULSION INTRAVENOUS at 08:04

## 2020-05-20 RX ADMIN — PANTOPRAZOLE SODIUM 40 MG: 40 INJECTION, POWDER, FOR SOLUTION INTRAVENOUS at 19:58

## 2020-05-20 RX ADMIN — PANTOPRAZOLE SODIUM 40 MG: 40 INJECTION, POWDER, FOR SOLUTION INTRAVENOUS at 08:04

## 2020-05-20 RX ADMIN — Medication 50 MCG: at 03:45

## 2020-05-20 RX ADMIN — ASPIRIN 81 MG CHEWABLE TABLET 81 MG: 81 TABLET CHEWABLE at 08:04

## 2020-05-20 RX ADMIN — Medication: at 00:09

## 2020-05-20 RX ADMIN — POTASSIUM CHLORIDE 20 MEQ: 1.5 POWDER, FOR SOLUTION ORAL at 16:53

## 2020-05-20 RX ADMIN — PIPERACILLIN AND TAZOBACTAM 3.38 G: 3; .375 INJECTION, POWDER, FOR SOLUTION INTRAVENOUS at 05:16

## 2020-05-20 RX ADMIN — SODIUM CHLORIDE 1000 ML: 9 INJECTION, SOLUTION INTRAVENOUS at 01:39

## 2020-05-20 RX ADMIN — Medication 100 MCG/HR: at 10:36

## 2020-05-20 RX ADMIN — VANCOMYCIN HYDROCHLORIDE 2500 MG: 1 INJECTION, POWDER, LYOPHILIZED, FOR SOLUTION INTRAVENOUS at 21:22

## 2020-05-20 ASSESSMENT — ACTIVITIES OF DAILY LIVING (ADL)
ADLS_ACUITY_SCORE: 22

## 2020-05-20 ASSESSMENT — MIFFLIN-ST. JEOR: SCORE: 1902.75

## 2020-05-20 NOTE — PROGRESS NOTES
Cardiology Progress Note  Scot Bishop MRN: [id number]  Age: 39 year old, : 1980  Date: 2020            Assessment and Plan:     Scot Bishop is a 39 year old male who was admitted on 2020 for cardiac arrest. Pt reportedly had chest pain that started on 20. He was using Drano on his pipes at home and did not initially seek medical attention for CP and SOB since it said on the box that Drano can cause those symptoms. He took a shower and had syncopal episode after coming out of the shower. EMS was called; initial rhythm asystole. With chest compressions pt eventually had PEA and then eventually had VF in the field. After multiple cycles of epinephrine had ROSC. He was intubated in the field.   Pt was brought to [facility] ED where he regained a pulse and was brought to the Cath Lab for coronary angiogram. Initially, BP was 90s/60s as he was being transported from ED but he had recurrent cardiac arrest on arrival to Cath Lab. ECG showed ST elevation in aVR. He was promptly placed on VA ECMO. He had thrombotic occlusion of proximal LAD and underwent successful aspiration thrombectomy and PCI w/ NAZIA of proximal and mid LAD.     Interval events: ECMO decannulation at bedside yesterday. Received 1.5L NS overnight for tachycardia and low UOP. EKG w/ lateral Torri - unchanged from previous and troponin recheck downtrending.     Unclear etiology of sinus tachycardia; no PE on admission CT chest, no mechanical complications or new WMAs on TTE this AM and RV normal, no new fevers or hypotension concerning for sepsis. TSH normal. Per pt's father, no known alcohol dependence although pt is 2 pack per day smoker and drinks multiple cans of Mountain Dew per day. Suspect ongoing tachycardia d/t evolving MI and post-arrest state.     Today's plan:   -repeat echo  -transfuse 1 unit PRBCs   -pull IABP   -wean sedation as able   -lasix 40 mg IV this AM   -monitor I/O  -add subcutaneous  heparin for DVT prophylaxis after IABP pulled     Neurology: Intubated, sedated. Initial CT head negative. EEG this AM showed encephalopathy, no seizures. Cooled to 34 degrees on arrival; rewarmed yesterday AM.   -continue fentanyl and propofol for sedation   -off versed since 5/19 AM  -RASS goal -4 to -5    Cardiovascular / Hemodynamics: Refractory VF arrest    S/p NAZIA to proximal-mid LAD  Sinus tachycardia   Peripheral VA ECMO inserted for cardiac arrest. LA 16. Received 1.5L fluid bolus last night for ongoing tachycardia, low UOP. EKG showed sinus tachycardia w/ lateral Torri - unchanged from previous and troponin recheck downtrending. Suspect ongoing tachycardia d/t evolving MI and post-arrest state.  TTE 5/18/20: EF <30% on VA ECMO 3.7L   EKG: Sinus tachycardia w/ lateral Torri   -s/p ECMO decannulation at bedside on 5/19  -repeat echo today   -continue ASA 81mg and ticagrelor 90mg BID  -rewarmed   -MAP goal 65-90; nipride gtt PRN    -hold statin for now given likely hepatic injury during arrest  -hold ACE/ARB for now given likely reduced renal fxn after arrest  -holding beta blocker given shock    Pulmonary: Acute hypoxic respiratory failure  COVID negative  Admission CT chest showed bilateral consolidations c/w aspiration PNA.   Vent settings: 15/500/10/60%  CXR: Increased pulm edema. ETT 4.6 cm above the chaim. Lines in stable position.   -vanc/zosyn for asp PNA   -wean vent as able  -daily CXR  -Q2h ABGs for now  -consider scheduled duonebs if signs of lung dz, currently PRN       GI and Nutrition: Shock liver  GIB, resolved   -monitor BID LFTs  -Nutrition consult for TF via OG    -bowel regimen   -GI Prophylaxis: Protonix BID for UGIB   Renal, Fluid and Electrolytes: Acute kidney injury   Cr trending up to 1.93 this AM. UOP non oliguric. Received 1.5L fluid bolus last night for tachycardia and low UOP.   -lasix 40 mg IV this AM to keep net neg 500mL to 1L  -monitor urine output  -maintain K>4 and Mg>2   "  Infectious Disease: Aspiration pneumonia  Leukocytosis, resolved  No signs of infection. Leukocytosis c/w arrest. Blood cultures collected - negative thus far.   -vancomycin/zosyn x5 days (5/17 - ) for asp PNA   -monitor for signs of infection given lines and leukocytosis   Hematology and Oncology: Receiving ASA/ticagrelor for NAZIA. Hgb 7.1.  -transfuse 1 unit PRBCs for hgb 7.1  -transfuse for Hgb<8  -DVT PPX: will add subcutaneous heparin after IABP pulled today    Endocrinology: No known medical history. BG elevated.  -insulin gtt  -f/u HgbA1c    Lines:   L femoral IABP line May 17, 2020  L femoral vein Thermogard May 17, 2020  R radial arterial line May 17, 2020  ETT May 17, 2020  Silva catheter May 17, 2020  OG tube May 17, 2020  Restraint: needed    Current lines are required for patient management       Family update by me today: Yes     Code Status: Full code     The pt was discussed and evaluated with Dr. Alanis, attending physician, who agrees with the assessment and plan above.     Kala Chiang, APRN CNP          Objective     Temp 98.4  F (36.9  C)   Resp 17   Ht 1.676 m (5' 6\")   Wt 105 kg (231 lb 7.7 oz)   SpO2 98%   BMI 37.36 kg/m    Temp:  [97.2  F (36.2  C)-99.3  F (37.4  C)] 98.4  F (36.9  C)  Heart Rate:  [118-148] 129  Resp:  [15-21] 17  MAP:  [66 mmHg-123 mmHg] 73 mmHg  Arterial Line BP: ()/(41-70) 106/53  FiO2 (%):  [40 %-60 %] 60 %  SpO2:  [91 %-100 %] 98 %  Wt Readings from Last 2 Encounters:   05/20/20 105 kg (231 lb 7.7 oz)     I/O last 3 completed shifts:  In: 3637.5 [I.V.:3097.5; NG/GT:360]  Out: 2296 [Urine:2146; Emesis/NG output:150]      GENERAL APPEARANCE: Intubated, sedated. NAD.  HEENT: No icterus, PERRL 2 mm, ETT in place, OG tube in place  CARDIOVASCULAR: regular rhythm, normal S1 and S2, no S3 or S4 and no murmur, click or rub. Normal PMI. Pulses dopplerable.  RESP: Coarse bilaterally. Mechanical ventilation.    GASTRO: Soft, bowel sounds hypoactive but " present  GENITOURINARY: Silva in place.  EXTREMITIES: Warm, +1 edema, pulses dopplered, as above. ThermoGard via left groin.   NEURO: Sedated and intubated, Pupils equal and reactive, 2 mm. Fent and propofol for sedation.  INTEGUMENTARY: No rashes. Line sites CDI  LINES/TUBES/DRAINS: IABP and ThermoGard in L groin. R radial Arterial line. ETT. OG. Silva Catheter.          Data:     Vent Settings:  Resp: 17 SpO2: 98 % O2 Device: Mechanical Ventilator      Arterial Blood Gas:   Recent Labs   Lab 05/20/20  0541 05/20/20  0242 05/20/20  0113 05/20/20  0001   PH 7.35 7.34* 7.34* 7.35   PCO2 43 44 46* 45   PO2 73* 70* 72* 81   HCO3 24 25 25 25   O2PER 60.0 60.0% 60.0 75.0       Vitals:    05/18/20 0400 05/19/20 0000 05/20/20 0000   Weight: 105.1 kg (231 lb 11.3 oz) 105.5 kg (232 lb 9.4 oz) 105 kg (231 lb 7.7 oz)   I/O last 3 completed shifts:  In: 3637.5 [I.V.:3097.5; NG/GT:360]  Out: 2296 [Urine:2146; Emesis/NG output:150]  Recent Labs   Lab 05/20/20  0356 05/19/20  2205    143   POTASSIUM 4.4 4.0   CHLORIDE 114* 113*   CO2 23 24   ANIONGAP 7 6   *  130* 141*  135*   BUN 36* 29   CR 1.93* 1.81*   TEN 7.6* 7.6*     No components found for: URINE   Recent Labs   Lab 05/20/20  0356 05/19/20  2205 05/19/20  1550   * 442* 537*   * 525* 592*   BILITOTAL 0.6 0.6 0.5   ALBUMIN 2.6* 2.9* 3.0*   PROTTOTAL 5.5* 5.7* 5.9*   ALKPHOS 44 45 44     Temp: 98.4  F (36.9  C) Temp src: EsophagealTemp  Min: 97.2  F (36.2  C)  Max: 99.3  F (37.4  C)   Recent Labs   Lab 05/20/20  0356 05/19/20 2205 05/19/20  1550 05/19/20  0952 05/19/20  0415   WBC 16.9* 16.3* 11.7* 10.1 8.9   HGB 7.1* 8.1* 9.0* 8.7* 8.7*   HCT 22.4* 24.4* 27.3* 25.8* 26.3*   MCV 93 93 92 92 91   RDW 13.4 13.6 13.4 13.4 13.2   * 139* 121* 118* 106*     Recent Labs   Lab 05/20/20  0356 05/19/20 2205 05/19/20 1550 05/19/20  0952 05/19/20  0415   INR 1.29* 1.24* 1.25* 1.29* 1.40*   PTT 30 36 69* 69* 71*     Recent Labs   Lab 05/20/20 0356  20  2205 20  1924 20  1550 20  0952   *  130* 141*  135* 127* 104*  105* 132*  127*       All imaging personally reviewed:  Recent Results (from the past 24 hour(s))   Echocardiogram Complete    Narrative    [id number]  [id number]  [id number]  [id number]^GRIS^YOSHI           [facility],[place]  Echocardiography Laboratory  [address] [place] [address]     Name: FAINA FORRESTER  MRN: [id number]  : 1980  Study Date: 2020 08:16 AM  Age: 39 yrs  Gender: Male  Patient Location: [facility]  Reason For Study: Cardiac Arrest  Ordering Physician: YOSHI LANDRY  Performed By: Denisse Johnson RDCS     BSA: 2.1 m2  Height: 66 in  Weight: 231 lb  BP: 132/58 mmHg  _____________________________________________________________________________  __        Procedure  Complete Portable Echo Adult. Technically difficult study.Extremely poor  acoustic windows.  _____________________________________________________________________________  __        Interpretation Summary  VA ECMO at 3.7L/min. Technically difficult study. Extremely poor acoustic  windows.  Severely (EF <30%) reduced left ventricular function on extremely limited  views.  The right ventricle cannot be assessed.  No pericardial effusion is present.  Previous study not available for comparison.  _____________________________________________________________________________  __        Left Ventricle  Left ventricular wall thickness cannot evaluate. Left ventricular size is  normal. Severely (EF <30%) reduced left ventricular function is present. Left  ventricular diastolic function is not assessable. Severe diffuse hypokinesis  is present.     Right Ventricle  The right ventricle cannot be assessed. Right ventricular function cannot be  assessed due to poor image quality.     Mitral Valve  The mitral valve cannot be assessed.        Aortic Valve  The aortic valve opens with each cardiac cycle. On Doppler  interrogation,  there is no significant stenosis or regurgitation.     Tricuspid Valve  The tricuspid valve cannot be assessed. Pulmonary artery systolic pressure  cannot be assessed.     Pulmonic Valve  The pulmonic valve cannot be assessed.     Vessels  The aorta root cannot be assessed. The thoracic aorta cannot be assessed.  Venous ECMO cannula is visualized traversing the IVC.     Pericardium  No pericardial effusion is present.     Compared to Previous Study  Previous study not available for comparison.     _____________________________________________________________________________  __                       Report approved by: Ashlee Izquierdo 05/18/2020 09:18 AM                 _____________________________________________________________________________  __                Medications     Current Facility-Administered Medications   Medication     artificial tears ophthalmic ointment     aspirin (ASA) chewable tablet 81 mg     calcium chloride in  mL intermittent infusion 1 g     calcium chloride injection 1 g     cisatracurium (NIMBEX) 200 mg in D5W 100 mL infusion     dextrose 10% infusion     dextrose 10% infusion     glucose gel 15-30 g    Or     dextrose 50 % injection 25-50 mL    Or     glucagon injection 1 mg     fentaNYL (SUBLIMAZE) bolus from infusion pump-ADULT 50 mcg     fentaNYL (SUBLIMAZE) infusion     ipratropium - albuterol 0.5 mg/2.5 mg/3 mL (DUONEB) neb solution 3 mL     lidocaine (LMX4) cream     lidocaine 1 % 0.1-1 mL     lidocaine 1% with EPINEPHrine 1:100,000 30 mL, bupivacaine (MARCAINE) 30 mL     magnesium sulfate 2 g in water intermittent infusion     magnesium sulfate 4 g in 100 mL sterile water (premade)     midazolam (VERSED) drip - ADULT 100 mg/100 mL in NS PRE-MIX     midazolam (VERSED) injection 1-3 mg     multivitamins w/minerals (CERTAVITE) liquid 15 mL     naloxone (NARCAN) injection 0.1-0.4 mg     norepinephrine (LEVOPHED) 16 mg in  mL infusion      pantoprazole (PROTONIX) 40 mg IV push injection     piperacillin-tazobactam (ZOSYN) 3.375 g vial to attach to  mL bag     potassium chloride (KLOR-CON) Packet 20-40 mEq     potassium chloride 10 mEq in 100 mL intermittent infusion with 10 mg lidocaine     potassium chloride 10 mEq in 100 mL sterile water intermittent infusion (premix)     potassium chloride 20 mEq in 50 mL intermittent infusion     potassium chloride ER (KLOR-CON M) CR tablet 20-40 mEq     propofol (DIPRIVAN) infusion     sodium chloride (PF) 0.9% PF flush 3 mL     sodium chloride (PF) 0.9% PF flush 3 mL     sodium phosphate 10 mmol in D5W intermittent infusion     sodium phosphate 15 mmol in D5W intermittent infusion     sodium phosphate 20 mmol in D5W intermittent infusion     sodium phosphate 25 mmol in D5W intermittent infusion     ticagrelor (BRILINTA) tablet 90 mg     vancomycin place robert - receiving intermittent dosing     vasopressin (VASOSTRICT) 40 Units in sodium chloride 0.9 % 40 mL infusion                      I have seen and examined the patient with the CSI team. I agree with the assessment and plan of the note above.I have reviewed pertinent labs.     Jefferson Alanis MD  Interventional Cardiology  Pager: [phone]

## 2020-05-20 NOTE — PROCEDURES
[facility], [place]    Triple Lumen PICC Placement    Date/Time: 5/20/2020 6:03 PM  Performed by: Monet Shankar RN  Authorized by: Yash Carpenter MD   Indications: vascular access    UNIVERSAL PROTOCOL   Site Marked: Yes  Prior Images Obtained and Reviewed:  Yes  Required items: Required blood products, implants, devices and special equipment available    Patient identity confirmed:  Arm band, provided demographic data and hospital-assigned identification number  NA - No sedation, light sedation, or local anesthesia  Confirmation Checklist:  Patient's identity using two indicators, relevant allergies, procedure was appropriate and matched the consent or emergent situation and correct equipment/implants were available  Time out: Immediately prior to the procedure a time out was called    Universal Protocol: the Joint Commission Universal Protocol was followed    Preparation: Patient was prepped and draped in usual sterile fashion           ANESTHESIA    Anesthesia: Local infiltration  Local Anesthetic:  Lidocaine 1% without epinephrine  Anesthetic Total (mL):  4.5      SEDATION    Patient Sedated: No        Preparation: skin prepped with ChloraPrep  Skin prep agent: skin prep agent completely dried prior to procedure  Sterile barriers: maximum sterile barriers were used: cap, mask, sterile gown, sterile gloves, and large sterile sheet  Hand hygiene: hand hygiene performed prior to central venous catheter insertion  Type of line used: PICC and Power PICC  Catheter type: triple lumen  Lumen type: non-valved  Catheter size: 6 Fr  Brand: other (see comment) (Gazoob)  Lot number: [id number]  Placement method: venipuncture, MST, ultrasound and tip confirmation system  Number of attempts: 1  Successful placement: yes  Orientation: right  Location: basilic vein (0.46 vein diameter)  Arm circumference: adults 10 cm  Extremity circumference: 35  Visible catheter length: 1.5  Total  catheter length: 40  Dressing and securement: blood cleaned with CHG, chlorhexidine disc applied, statlock, sterile dressing applied and glue  Post procedure assessment: placement verified by x-ray, free fluid flow and blood return through all ports  PROCEDURE   Patient Tolerance:  Patient tolerated the procedure well with no immediate complications

## 2020-05-20 NOTE — PROCEDURES
Procedure Date: 05/19/2020      EEG #-3       DATE OF RECORDING/SERVICE DATE:  Day 3 of video EEG monitoring on 05/19/2020.       SOURCE FILE DURATION:  23 hours 49 minutes.      PATIENT INFORMATION:  This is a 39-year-old male who presented with cardiac arrest.  The EEG is being done to evaluate for seizures.        MEDICATIONS:  On this day of recording, the patient is on fentanyl 100 mcg per hour.  Midazolam was 2-5 mg per hour and was stopped at 01:15.  Propofol 50 mcg/kg per minute was stopped at 07:30 and restarted at 19:59 at 30 mcg/kg per minute.     TECHNICAL SUMMARY: This video EEG monitoring procedure was performed with 23 scalp electrodes in 10-20 system placements, and additional scalp, precordial and other surface electrodes used for electrical referencing and artifact detection. Video was reviewed intermittently by EEG technologist and physician for electroclinical seizures.      EEG BACKGROUND:  The patient has diffuse generalized low-voltage delta-theta slowing.  Electrocerebral potentials are up to 30 microvolts.  There is no well-formed parieto-occipital rhythm nor architecture.  After 08:26, patient is noted to have higher voltage rhythmic delta activity that is 2-4 Hz in frequency up to 50 microvolts and maximum in the bifrontal region and midline derivations.  This activity does become more rhythmic at 12:06.  There is a slight increase in amplitude up to 65 microvolts, but the frequency of delta slowing is the same.  This was not thought to be a seizure, rather part of the encephalopathic process with changes in the anesthetic medications.        ACTIVATION PROCEDURES:  The patient was given auditory and sensory stimuli, and the EEG was reactive.      EPILEPTIFORM DISCHARGES:  None.      ICTAL:  None.      IMPRESSION:  Video EEG day 3 is abnormal due to the presence of diffuse generalized delta-theta slowing consistent with a severe encephalopathy.  On this day, the patient's anesthetic  drip medications were modified several times resulting in a few changes in the EEG, particularly the presence of rhythmic delta activity in the bifrontal region that did not appear to be seizure activity, rather part of the encephalopathic process.  This rhythm was not suggestive of seizure activity.  No clear electrographic seizures or epileptiform discharges were seen.  Clinical correlation is advised.         DB GREGORY MD             D: 2020   T: 2020   MT: IWONA      Name:     FAINA FORRESTER   MRN:      [id number]        Account:        [id number]   :      1980           Procedure Date: 2020      Document: [id number]

## 2020-05-20 NOTE — PLAN OF CARE
D: ECMO/IABP. No vasopressors. Rewarmed overnight. Sedated with propofol/versed/fentanyl. HR 120s.   I/A: Versed/propofol stopped. Opens eyes, coughs, withdraws from pain. No tracking or following commands. HR increasing to 130s, CSI team updated on rounds. ECMO turndown this am, OR at bedside to decannulate this afternoon.   R: Will continue to monitor/assess and update MD as needed.

## 2020-05-20 NOTE — PLAN OF CARE
Major Shift Events:  Removed IABP, PICC placed can pull Thermogard catheter. 40 Lasix given over 2L out.. K 3.5 gave 20meq Potassium. Neuro still not following commands but opens eyes to stimulation and withdraws from pain. PIP increased from 10 to 29, pt was hard to suction, respiratory lavage suctioned and got small amounts up PIP remains in low 20s, suggested a bronch. Weaned F102 to 50% will continue to wean. Will continue to Monitor   Plan: Wean vent and sedation.  For vital signs and complete assessments, please see documentation flowsheets.

## 2020-05-20 NOTE — PROGRESS NOTES
Cardiology Critical Care Note - Cardiology  Jefferson Alanis M.D.  Critical Care Diagnoses:  Acute renal failure  Acute myocardial infarction of the LAD      Critical Care management intervention:  Gave 40 mg iv lasix   Fentanyl of 100    propofol at 20  stopped versed at 2.  Remove IABP today.  Stop heparin       Spent 35 minutes on critical care management on this patient.    .      Professor Zeke FUNES  Interventional Cariology and Cardiac Critical Care

## 2020-05-20 NOTE — PLAN OF CARE
Pt tachy 140-150 at beginning of shift. EKG obtained showing ST elevation. Notified cards fellow Dr Anna who discussed with attending. Obtained troponin which showed a downward trend. No other orders at the time. Updated throughout night of high HR, low urine output and significant stroke volume variation on arterial line waveform. Given a total of 1.5 L of fluid. Attempted to wean sedation overnight, prop down to 10 when pt began violently coughing and bucking vent. MAP dropped with coughing. Bolused and increased prop back to 20. Pt also shivers with any stimulation, given bumps of fentanyl when shivering persisted. Pt still on thermagaurd to keep normothermic. Copious secretions and ABG shows hypoxia. Adjusted vent to 500 TV and PEEP 10 per Dr Anna, no improvement in gas noted but sats in mid 90s.

## 2020-05-20 NOTE — ANESTHESIA CARE TRANSFER NOTE
Patient: Scot Bishop    Procedure(s):  ECMO Decannulation at Bedside    Diagnosis: Cardiac arrest (H) [I46.9]  Diagnosis Additional Information: No value filed.    Anesthesia Type:   No value filed.     Note:  Airway :ETT and Ventilator  Patient transferred to:ICU  Comments: RN at bedside throughout case. Report and care transferredICU Handoff: Call for PAUSE to initiate/utilize ICU HANDOFF, Identified Patient, Identified Responsible Provider, Reviewed the Pertinent Medical History, Discussed Surgical Course, Reviewed Intra-OP Anesthesia Management and Issues during Anesthesia, Set Expectations for Post Procedure Period and Allowed Opportunity for Questions and Acknowledgement of Understanding      Vitals: (Last set prior to Anesthesia Care Transfer)    CRNA VITALS  5/19/2020 1845 - 5/19/2020 1916      5/19/2020             Pulse:  89    ART BP:  125/53    ART Mean:  80    Ht Rate:  142    SpO2:  98 %                Electronically Signed By: TERRENCE Couch CRNA  May 19, 2020  7:16 PM

## 2020-05-20 NOTE — PROGRESS NOTES
SPIRITUAL HEALTH SERVICES  SPIRITUAL ASSESSMENT Progress Note (Palliative Focus)  [facility] ([place]) 4E    REFERRAL SOURCE: Palliative care follow up.    At request of co-parent Jacqueline, I printed photos of Scot's children (provided by email) and posted them in the Scot's room.     Plan: I will follow for spiritual support while Palliative Care is consulted.    Bela Gonzales  Palliative   Pager [phone]  [facility] Inpatient Team Consult pager [phone] (M-F 8-4:30)  After-hours Answering Service [phone]

## 2020-05-20 NOTE — PROGRESS NOTES
[facility]  Palliative Care Social Work Note:    Patient Info:  Scot Bishop is a 39 year old male with anterior stemi on ECMO     Brief summary of visit: Phone call with Scot's ex-SO (Jacqueline) who is the mother of his children. She was wanting some help in how to talk with the kids about Scot's illness and expected hospital course. Talked about allowing the kids choice about joining a video call, but looking at other generic pictures of ecmo prior to prepare them. She reports that she has some concerns as the kids were home with Scot when he had his cardiac arrest. Their daughter (almost 10 years old) was present with him when EMT arrived. Offered support and talked about her needs now vs down the road. Jacqueline is working on day to day at this time.       Date of Admission: 5/17/2020    Reason for consult: Patient and family support    Sources of information: Family member -ex-SO (Jacqueline)  Staff -palliative care team  Other -chart review    Recommendations & Plan:  -PCSW will continue to be available as needed for family support.      These recommendations have been discussed with Jacqueline, bedside RN, Palliative Care Team.    Symptoms & Concerns Addressed Today:  Family -Kids coping    Strengths Identified:    Supportive & involved family    Relationships & Support:  Aspects of relationships and support assessed today:    Identified family members: 2 kids (ages 9 & 6), parents, siblings, his ex-SO (kids' mom)     Professional supports: medical team    Family coping: Jacqueline feels that they're doing as well as they can for now.     Bereavement Risk concerns: moderate as kids were present at time of cardiac arrest    Coping, Mental Health & Adjustment to Illness:   Other -family's adjustment to Scot's illness    Goals, Decision Making & Advance Care Planning:   Prognosis, Goals, and/or Advance Care Planning were assessed today: No  If yes, brief summary of discussion:   Preferred language:  English  Patient's decision making preferences: unable to assess  I have concerns about the patient/family's health literacy today: No  Patient has a completed Health Care Directive: No.   Code status per chart review: full code    Key Palliative Symptom Data:  We are not helping to manage these symptoms currently in this patient.      Clinical Social Work Interventions:   Assessment of palliative specific issues    Introduction of Palliative clinical social work interventions   Adjustment to illness counseling  Facilitation of processing of thoughts/feelings  Family communication facilitated  Psychoeducation      ERMELINDA Betancur, [facility]  Palliative Care Clinical   Pager [phone]    [facility] Inpatient Team Consult Pager [phone] Mon-Fri 8-4:30  After hours Answering Service [phone]

## 2020-05-20 NOTE — PROGRESS NOTES
Preliminary Video EEG impression on May 19-20, 2020  Until 6am     Full report to follow. Please look in inpatient chart, under procedure section.     Patient had a cardiac arrest, underwent hypothermia treatment and now rewarmed.  EEG consist of generalized delta theta slowing up to 50  V in amplitude.  On this day of recording anesthetic drip medications are modified several times resulting in subtle changes in the amplitude of electrocerebral potentials.  On May 19, 2020 patient did have episodes of rhythmic bifrontal delta activity at slightly higher amplitude when anesthetic drips were reduced.  This rhythm did not evolve and was not suggestive of seizure activity, rather it was thought to represent the encephalopathic process.  No clear electrographic seizures or epileptiform discharges are seen.  EEG is consistent with a severe diffuse encephalopathy most likely due to anesthetic drip medications. If events of interest are noted, please press the event button. Clinical correlation is advised.     Antonella Franklin MD  Staff Epilepsy Neurologist    [phone]

## 2020-05-20 NOTE — PROGRESS NOTES
CLINICAL NUTRITION SERVICES - BRIEF NOTE   (see RD note on 5/19 for full assessment)    Per discussion with team today, plan to advance feeds via OGT.    Nutrition changes today:  - Adv TF by 15-20 ml q8h to goal - Impact peptide @ 55 ml/hr     Rosemary Reynoso, PATRICIA, LD  [id number]  Pgr: [phone]

## 2020-05-21 ENCOUNTER — APPOINTMENT (OUTPATIENT)
Dept: GENERAL RADIOLOGY | Facility: CLINIC | Age: 40
End: 2020-05-21
Attending: INTERNAL MEDICINE
Payer: MEDICAID

## 2020-05-21 ENCOUNTER — APPOINTMENT (OUTPATIENT)
Dept: NEUROLOGY | Facility: CLINIC | Age: 40
End: 2020-05-21
Payer: MEDICAID

## 2020-05-21 ENCOUNTER — APPOINTMENT (OUTPATIENT)
Dept: GENERAL RADIOLOGY | Facility: CLINIC | Age: 40
End: 2020-05-21
Attending: STUDENT IN AN ORGANIZED HEALTH CARE EDUCATION/TRAINING PROGRAM
Payer: MEDICAID

## 2020-05-21 LAB
ALBUMIN SERPL-MCNC: 2.7 G/DL (ref 3.4–5)
ALP SERPL-CCNC: 86 U/L (ref 40–150)
ALT SERPL W P-5'-P-CCNC: 318 U/L (ref 0–70)
ANION GAP SERPL CALCULATED.3IONS-SCNC: 4 MMOL/L (ref 3–14)
ANION GAP SERPL CALCULATED.3IONS-SCNC: 8 MMOL/L (ref 3–14)
APPEARANCE FLD: NORMAL
AST SERPL W P-5'-P-CCNC: 251 U/L (ref 0–45)
BACTERIA SPEC CULT: ABNORMAL
BACTERIA SPEC CULT: ABNORMAL
BASE EXCESS BLDA CALC-SCNC: 1.6 MMOL/L
BASE EXCESS BLDA CALC-SCNC: 2.2 MMOL/L
BASE EXCESS BLDA CALC-SCNC: 2.4 MMOL/L
BASE EXCESS BLDA CALC-SCNC: 3.2 MMOL/L
BASE EXCESS BLDA CALC-SCNC: 4 MMOL/L
BILIRUB SERPL-MCNC: 0.7 MG/DL (ref 0.2–1.3)
BUN SERPL-MCNC: 33 MG/DL (ref 7–30)
BUN SERPL-MCNC: 39 MG/DL (ref 7–30)
CALCIUM SERPL-MCNC: 8.1 MG/DL (ref 8.5–10.1)
CALCIUM SERPL-MCNC: 8.2 MG/DL (ref 8.5–10.1)
CHLORIDE SERPL-SCNC: 114 MMOL/L (ref 94–109)
CHLORIDE SERPL-SCNC: 116 MMOL/L (ref 94–109)
CO2 SERPL-SCNC: 25 MMOL/L (ref 20–32)
CO2 SERPL-SCNC: 26 MMOL/L (ref 20–32)
COLOR FLD: NORMAL
CREAT SERPL-MCNC: 1.22 MG/DL (ref 0.66–1.25)
CREAT SERPL-MCNC: 1.32 MG/DL (ref 0.66–1.25)
ERYTHROCYTE [DISTWIDTH] IN BLOOD BY AUTOMATED COUNT: 13.6 % (ref 10–15)
ERYTHROCYTE [DISTWIDTH] IN BLOOD BY AUTOMATED COUNT: 13.9 % (ref 10–15)
GFR SERPL CREATININE-BSD FRML MDRD: 67 ML/MIN/{1.73_M2}
GFR SERPL CREATININE-BSD FRML MDRD: 73 ML/MIN/{1.73_M2}
GLUCOSE BLDC GLUCOMTR-MCNC: 126 MG/DL (ref 70–99)
GLUCOSE BLDC GLUCOMTR-MCNC: 128 MG/DL (ref 70–99)
GLUCOSE BLDC GLUCOMTR-MCNC: 158 MG/DL (ref 70–99)
GLUCOSE SERPL-MCNC: 134 MG/DL (ref 70–99)
GLUCOSE SERPL-MCNC: 148 MG/DL (ref 70–99)
GRAM STN SPEC: NORMAL
HCO3 BLD-SCNC: 26 MMOL/L (ref 21–28)
HCO3 BLD-SCNC: 27 MMOL/L (ref 21–28)
HCO3 BLD-SCNC: 28 MMOL/L (ref 21–28)
HCT VFR BLD AUTO: 22.7 % (ref 40–53)
HCT VFR BLD AUTO: 22.8 % (ref 40–53)
HGB BLD-MCNC: 7.5 G/DL (ref 13.3–17.7)
HGB BLD-MCNC: 7.6 G/DL (ref 13.3–17.7)
INR PPP: 1.19 (ref 0.86–1.14)
LACTATE BLD-SCNC: 1 MMOL/L (ref 0.7–2)
LACTATE BLD-SCNC: 1.6 MMOL/L (ref 0.7–2)
LYMPHOCYTES NFR FLD MANUAL: 3 %
Lab: ABNORMAL
MAGNESIUM SERPL-MCNC: 2.6 MG/DL (ref 1.6–2.3)
MCH RBC QN AUTO: 30.7 PG (ref 26.5–33)
MCH RBC QN AUTO: 31 PG (ref 26.5–33)
MCHC RBC AUTO-ENTMCNC: 33 G/DL (ref 31.5–36.5)
MCHC RBC AUTO-ENTMCNC: 33.3 G/DL (ref 31.5–36.5)
MCV RBC AUTO: 93 FL (ref 78–100)
MCV RBC AUTO: 93 FL (ref 78–100)
NEUTS BAND NFR FLD MANUAL: 20 %
O2/TOTAL GAS SETTING VFR VENT: 45 %
O2/TOTAL GAS SETTING VFR VENT: 50 %
O2/TOTAL GAS SETTING VFR VENT: 50 %
OTHER CELLS FLD MANUAL: 77 %
OXYHGB MFR BLD: 94 % (ref 92–100)
OXYHGB MFR BLD: 95 % (ref 92–100)
OXYHGB MFR BLD: 97 % (ref 92–100)
OXYHGB MFR BLD: 97 % (ref 92–100)
PCO2 BLD: 38 MM HG (ref 35–45)
PCO2 BLD: 40 MM HG (ref 35–45)
PCO2 BLD: 40 MM HG (ref 35–45)
PCO2 BLD: 41 MM HG (ref 35–45)
PCO2 BLD: 42 MM HG (ref 35–45)
PH BLD: 7.42 PH (ref 7.35–7.45)
PH BLD: 7.43 PH (ref 7.35–7.45)
PH BLD: 7.43 PH (ref 7.35–7.45)
PH BLD: 7.45 PH (ref 7.35–7.45)
PH BLD: 7.46 PH (ref 7.35–7.45)
PHOSPHATE SERPL-MCNC: 2.2 MG/DL (ref 2.5–4.5)
PLATELET # BLD AUTO: 126 10E9/L (ref 150–450)
PLATELET # BLD AUTO: 129 10E9/L (ref 150–450)
PO2 BLD: 116 MM HG (ref 80–105)
PO2 BLD: 77 MM HG (ref 80–105)
PO2 BLD: 80 MM HG (ref 80–105)
PO2 BLD: 85 MM HG (ref 80–105)
PO2 BLD: 97 MM HG (ref 80–105)
POTASSIUM BLD-SCNC: 3.6 MMOL/L (ref 3.4–5.3)
POTASSIUM SERPL-SCNC: 3.3 MMOL/L (ref 3.4–5.3)
POTASSIUM SERPL-SCNC: 3.4 MMOL/L (ref 3.4–5.3)
POTASSIUM SERPL-SCNC: 4.1 MMOL/L (ref 3.4–5.3)
PROT SERPL-MCNC: 6.2 G/DL (ref 6.8–8.8)
RBC # BLD AUTO: 2.44 10E12/L (ref 4.4–5.9)
RBC # BLD AUTO: 2.45 10E12/L (ref 4.4–5.9)
S100 CA BINDING PROTEIN B SER-MCNC: 1179 NG/L (ref 0–96)
S100 CA BINDING PROTEIN B SER-MCNC: 2131 NG/L (ref 0–96)
S100 CA BINDING PROTEIN B SER-MCNC: 678 NG/L (ref 0–96)
SODIUM SERPL-SCNC: 146 MMOL/L (ref 133–144)
SODIUM SERPL-SCNC: 147 MMOL/L (ref 133–144)
SPECIMEN SOURCE FLD: NORMAL
SPECIMEN SOURCE: ABNORMAL
SPECIMEN SOURCE: NORMAL
TRIGL SERPL-MCNC: 609 MG/DL
TROPONIN I SERPL-MCNC: 36.69 UG/L (ref 0–0.04)
TROPONIN I SERPL-MCNC: 60.43 UG/L (ref 0–0.04)
WBC # BLD AUTO: 12.9 10E9/L (ref 4–11)
WBC # BLD AUTO: 13.7 10E9/L (ref 4–11)
WBC # FLD AUTO: 90 /UL

## 2020-05-21 PROCEDURE — 93010 ELECTROCARDIOGRAM REPORT: CPT | Performed by: INTERNAL MEDICINE

## 2020-05-21 PROCEDURE — 84100 ASSAY OF PHOSPHORUS: CPT | Performed by: NURSE PRACTITIONER

## 2020-05-21 PROCEDURE — 83605 ASSAY OF LACTIC ACID: CPT

## 2020-05-21 PROCEDURE — 83735 ASSAY OF MAGNESIUM: CPT | Performed by: NURSE PRACTITIONER

## 2020-05-21 PROCEDURE — 25000128 H RX IP 250 OP 636: Performed by: NURSE PRACTITIONER

## 2020-05-21 PROCEDURE — 84132 ASSAY OF SERUM POTASSIUM: CPT | Performed by: NURSE PRACTITIONER

## 2020-05-21 PROCEDURE — 0B9C8ZZ DRAINAGE OF RIGHT UPPER LUNG LOBE, VIA NATURAL OR ARTIFICIAL OPENING ENDOSCOPIC: ICD-10-PCS | Performed by: INTERNAL MEDICINE

## 2020-05-21 PROCEDURE — 40000275 ZZH STATISTIC RCP TIME EA 10 MIN

## 2020-05-21 PROCEDURE — 25000132 ZZH RX MED GY IP 250 OP 250 PS 637: Performed by: INTERNAL MEDICINE

## 2020-05-21 PROCEDURE — 25800030 ZZH RX IP 258 OP 636: Performed by: INTERNAL MEDICINE

## 2020-05-21 PROCEDURE — 0B9D8ZX DRAINAGE OF RIGHT MIDDLE LUNG LOBE, VIA NATURAL OR ARTIFICIAL OPENING ENDOSCOPIC, DIAGNOSTIC: ICD-10-PCS | Performed by: INTERNAL MEDICINE

## 2020-05-21 PROCEDURE — 40000014 ZZH STATISTIC ARTERIAL MONITORING DAILY

## 2020-05-21 PROCEDURE — 94640 AIRWAY INHALATION TREATMENT: CPT | Mod: 76

## 2020-05-21 PROCEDURE — 25000125 ZZHC RX 250: Performed by: INTERNAL MEDICINE

## 2020-05-21 PROCEDURE — 31624 DX BRONCHOSCOPE/LAVAGE: CPT | Performed by: INTERNAL MEDICINE

## 2020-05-21 PROCEDURE — 99233 SBSQ HOSP IP/OBS HIGH 50: CPT | Mod: 25 | Performed by: INTERNAL MEDICINE

## 2020-05-21 PROCEDURE — 25000128 H RX IP 250 OP 636: Performed by: INTERNAL MEDICINE

## 2020-05-21 PROCEDURE — 0B9F8ZZ DRAINAGE OF RIGHT LOWER LUNG LOBE, VIA NATURAL OR ARTIFICIAL OPENING ENDOSCOPIC: ICD-10-PCS | Performed by: INTERNAL MEDICINE

## 2020-05-21 PROCEDURE — G0463 HOSPITAL OUTPT CLINIC VISIT: HCPCS

## 2020-05-21 PROCEDURE — 87015 SPECIMEN INFECT AGNT CONCNTJ: CPT | Performed by: INTERNAL MEDICINE

## 2020-05-21 PROCEDURE — 40000986 XR CHEST PORT 1 VW

## 2020-05-21 PROCEDURE — 25000125 ZZHC RX 250

## 2020-05-21 PROCEDURE — 31624 DX BRONCHOSCOPE/LAVAGE: CPT

## 2020-05-21 PROCEDURE — 95712 VEEG 2-12 HR INTMT MNTR: CPT

## 2020-05-21 PROCEDURE — 99233 SBSQ HOSP IP/OBS HIGH 50: CPT | Performed by: INTERNAL MEDICINE

## 2020-05-21 PROCEDURE — 25000132 ZZH RX MED GY IP 250 OP 250 PS 637: Performed by: STUDENT IN AN ORGANIZED HEALTH CARE EDUCATION/TRAINING PROGRAM

## 2020-05-21 PROCEDURE — 87070 CULTURE OTHR SPECIMN AEROBIC: CPT | Performed by: INTERNAL MEDICINE

## 2020-05-21 PROCEDURE — 20000004 ZZH R&B ICU UMMC

## 2020-05-21 PROCEDURE — 93005 ELECTROCARDIOGRAM TRACING: CPT

## 2020-05-21 PROCEDURE — 00000146 ZZHCL STATISTIC GLUCOSE BY METER IP

## 2020-05-21 PROCEDURE — 25000125 ZZHC RX 250: Performed by: NURSE PRACTITIONER

## 2020-05-21 PROCEDURE — 25000132 ZZH RX MED GY IP 250 OP 250 PS 637: Performed by: NURSE PRACTITIONER

## 2020-05-21 PROCEDURE — 84132 ASSAY OF SERUM POTASSIUM: CPT

## 2020-05-21 PROCEDURE — 84478 ASSAY OF TRIGLYCERIDES: CPT | Performed by: NURSE PRACTITIONER

## 2020-05-21 PROCEDURE — 80048 BASIC METABOLIC PNL TOTAL CA: CPT | Performed by: NURSE PRACTITIONER

## 2020-05-21 PROCEDURE — 87077 CULTURE AEROBIC IDENTIFY: CPT | Performed by: INTERNAL MEDICINE

## 2020-05-21 PROCEDURE — 85610 PROTHROMBIN TIME: CPT | Performed by: NURSE PRACTITIONER

## 2020-05-21 PROCEDURE — 82805 BLOOD GASES W/O2 SATURATION: CPT

## 2020-05-21 PROCEDURE — C9113 INJ PANTOPRAZOLE SODIUM, VIA: HCPCS | Performed by: INTERNAL MEDICINE

## 2020-05-21 PROCEDURE — 94003 VENT MGMT INPAT SUBQ DAY: CPT

## 2020-05-21 PROCEDURE — 82803 BLOOD GASES ANY COMBINATION: CPT | Performed by: NURSE PRACTITIONER

## 2020-05-21 PROCEDURE — 94640 AIRWAY INHALATION TREATMENT: CPT

## 2020-05-21 PROCEDURE — 89051 BODY FLUID CELL COUNT: CPT | Performed by: INTERNAL MEDICINE

## 2020-05-21 PROCEDURE — 83605 ASSAY OF LACTIC ACID: CPT | Performed by: NURSE PRACTITIONER

## 2020-05-21 PROCEDURE — 25800030 ZZH RX IP 258 OP 636: Performed by: NURSE PRACTITIONER

## 2020-05-21 PROCEDURE — 84484 ASSAY OF TROPONIN QUANT: CPT | Performed by: NURSE PRACTITIONER

## 2020-05-21 PROCEDURE — 85027 COMPLETE CBC AUTOMATED: CPT | Performed by: NURSE PRACTITIONER

## 2020-05-21 PROCEDURE — 80053 COMPREHEN METABOLIC PANEL: CPT | Performed by: NURSE PRACTITIONER

## 2020-05-21 PROCEDURE — 87205 SMEAR GRAM STAIN: CPT | Performed by: INTERNAL MEDICINE

## 2020-05-21 PROCEDURE — 27210437 ZZH NUTRITION PRODUCT SEMIELEM INTERMED LITER

## 2020-05-21 PROCEDURE — 25000128 H RX IP 250 OP 636: Performed by: STUDENT IN AN ORGANIZED HEALTH CARE EDUCATION/TRAINING PROGRAM

## 2020-05-21 PROCEDURE — 71045 X-RAY EXAM CHEST 1 VIEW: CPT

## 2020-05-21 PROCEDURE — 25000128 H RX IP 250 OP 636

## 2020-05-21 RX ORDER — LIDOCAINE HYDROCHLORIDE 40 MG/ML
5 SOLUTION TOPICAL
Status: DISCONTINUED | OUTPATIENT
Start: 2020-05-21 | End: 2020-05-21

## 2020-05-21 RX ORDER — FUROSEMIDE 10 MG/ML
40 INJECTION INTRAMUSCULAR; INTRAVENOUS ONCE
Status: COMPLETED | OUTPATIENT
Start: 2020-05-21 | End: 2020-05-21

## 2020-05-21 RX ORDER — LIDOCAINE HYDROCHLORIDE 10 MG/ML
INJECTION, SOLUTION EPIDURAL; INFILTRATION; INTRACAUDAL; PERINEURAL
Status: COMPLETED
Start: 2020-05-21 | End: 2020-05-21

## 2020-05-21 RX ORDER — DEXMEDETOMIDINE HYDROCHLORIDE 4 UG/ML
.2-.7 INJECTION, SOLUTION INTRAVENOUS CONTINUOUS
Status: DISCONTINUED | OUTPATIENT
Start: 2020-05-21 | End: 2020-05-21

## 2020-05-21 RX ORDER — DEXMEDETOMIDINE HYDROCHLORIDE 4 UG/ML
0.2-0.7 INJECTION, SOLUTION INTRAVENOUS CONTINUOUS
Status: DISCONTINUED | OUTPATIENT
Start: 2020-05-21 | End: 2020-05-22

## 2020-05-21 RX ORDER — ALBUTEROL SULFATE 0.83 MG/ML
2.5 SOLUTION RESPIRATORY (INHALATION)
Status: DISCONTINUED | OUTPATIENT
Start: 2020-05-21 | End: 2020-05-26

## 2020-05-21 RX ORDER — ACETYLCYSTEINE 100 MG/ML
4 SOLUTION ORAL; RESPIRATORY (INHALATION)
Status: DISCONTINUED | OUTPATIENT
Start: 2020-05-21 | End: 2020-06-01

## 2020-05-21 RX ORDER — ACETAMINOPHEN 650 MG/1
650 SUPPOSITORY RECTAL EVERY 4 HOURS PRN
Status: DISCONTINUED | OUTPATIENT
Start: 2020-05-21 | End: 2020-05-29

## 2020-05-21 RX ORDER — AMOXICILLIN 250 MG
1 CAPSULE ORAL 2 TIMES DAILY
Status: DISCONTINUED | OUTPATIENT
Start: 2020-05-21 | End: 2020-06-01

## 2020-05-21 RX ORDER — ACETAMINOPHEN 325 MG/1
650 TABLET ORAL EVERY 4 HOURS PRN
Status: DISCONTINUED | OUTPATIENT
Start: 2020-05-21 | End: 2020-05-29

## 2020-05-21 RX ORDER — HEPARIN SODIUM 5000 [USP'U]/.5ML
5000 INJECTION, SOLUTION INTRAVENOUS; SUBCUTANEOUS EVERY 8 HOURS SCHEDULED
Status: DISCONTINUED | OUTPATIENT
Start: 2020-05-21 | End: 2020-06-25 | Stop reason: HOSPADM

## 2020-05-21 RX ADMIN — HEPARIN SODIUM 5000 UNITS: 5000 INJECTION, SOLUTION INTRAVENOUS; SUBCUTANEOUS at 08:30

## 2020-05-21 RX ADMIN — ALBUTEROL SULFATE 2.5 MG: 2.5 SOLUTION RESPIRATORY (INHALATION) at 19:55

## 2020-05-21 RX ADMIN — SENNOSIDES AND DOCUSATE SODIUM 1 TABLET: 8.6; 5 TABLET ORAL at 08:30

## 2020-05-21 RX ADMIN — PROPOFOL 30 MCG/KG/MIN: 10 INJECTION, EMULSION INTRAVENOUS at 00:52

## 2020-05-21 RX ADMIN — ACETYLCYSTEINE 4 ML: 100 SOLUTION ORAL; RESPIRATORY (INHALATION) at 19:55

## 2020-05-21 RX ADMIN — PROPOFOL 10 MCG/KG/MIN: 10 INJECTION, EMULSION INTRAVENOUS at 23:13

## 2020-05-21 RX ADMIN — ASPIRIN 81 MG CHEWABLE TABLET 81 MG: 81 TABLET CHEWABLE at 08:30

## 2020-05-21 RX ADMIN — HEPARIN SODIUM 5000 UNITS: 5000 INJECTION, SOLUTION INTRAVENOUS; SUBCUTANEOUS at 13:57

## 2020-05-21 RX ADMIN — PIPERACILLIN AND TAZOBACTAM 3.38 G: 3; .375 INJECTION, POWDER, FOR SOLUTION INTRAVENOUS at 05:45

## 2020-05-21 RX ADMIN — ACETAMINOPHEN 650 MG: 325 TABLET ORAL at 12:40

## 2020-05-21 RX ADMIN — ACETAMINOPHEN 650 MG: 325 TABLET ORAL at 01:11

## 2020-05-21 RX ADMIN — PANTOPRAZOLE SODIUM 40 MG: 40 INJECTION, POWDER, FOR SOLUTION INTRAVENOUS at 20:54

## 2020-05-21 RX ADMIN — PROPOFOL 30 MCG/KG/MIN: 10 INJECTION, EMULSION INTRAVENOUS at 05:22

## 2020-05-21 RX ADMIN — TICAGRELOR 90 MG: 90 TABLET ORAL at 08:30

## 2020-05-21 RX ADMIN — Medication 5 ML: at 20:55

## 2020-05-21 RX ADMIN — ACETYLCYSTEINE 4 ML: 100 SOLUTION ORAL; RESPIRATORY (INHALATION) at 08:11

## 2020-05-21 RX ADMIN — POTASSIUM CHLORIDE 20 MEQ: 1.5 POWDER, FOR SOLUTION ORAL at 18:29

## 2020-05-21 RX ADMIN — MIDAZOLAM 4 MG: 1 INJECTION INTRAMUSCULAR; INTRAVENOUS at 16:19

## 2020-05-21 RX ADMIN — Medication 100 MCG/HR: at 03:29

## 2020-05-21 RX ADMIN — PANTOPRAZOLE SODIUM 40 MG: 40 INJECTION, POWDER, FOR SOLUTION INTRAVENOUS at 08:30

## 2020-05-21 RX ADMIN — POTASSIUM CHLORIDE 20 MEQ: 1.5 POWDER, FOR SOLUTION ORAL at 03:27

## 2020-05-21 RX ADMIN — PIPERACILLIN AND TAZOBACTAM 3.38 G: 3; .375 INJECTION, POWDER, FOR SOLUTION INTRAVENOUS at 12:40

## 2020-05-21 RX ADMIN — TICAGRELOR 90 MG: 90 TABLET ORAL at 20:53

## 2020-05-21 RX ADMIN — ALBUTEROL SULFATE 2.5 MG: 2.5 SOLUTION RESPIRATORY (INHALATION) at 12:06

## 2020-05-21 RX ADMIN — LIDOCAINE HYDROCHLORIDE 5 ML: 10 INJECTION, SOLUTION EPIDURAL; INFILTRATION; INTRACAUDAL; PERINEURAL at 13:32

## 2020-05-21 RX ADMIN — PROPOFOL 15 MCG/KG/MIN: 10 INJECTION, EMULSION INTRAVENOUS at 13:58

## 2020-05-21 RX ADMIN — PIPERACILLIN AND TAZOBACTAM 3.38 G: 3; .375 INJECTION, POWDER, FOR SOLUTION INTRAVENOUS at 18:28

## 2020-05-21 RX ADMIN — ALBUTEROL SULFATE 2.5 MG: 2.5 SOLUTION RESPIRATORY (INHALATION) at 08:11

## 2020-05-21 RX ADMIN — VANCOMYCIN HYDROCHLORIDE 1750 MG: 10 INJECTION, POWDER, LYOPHILIZED, FOR SOLUTION INTRAVENOUS at 21:51

## 2020-05-21 RX ADMIN — HEPARIN SODIUM 5000 UNITS: 5000 INJECTION, SOLUTION INTRAVENOUS; SUBCUTANEOUS at 22:21

## 2020-05-21 RX ADMIN — POTASSIUM CHLORIDE 40 MEQ: 1.5 POWDER, FOR SOLUTION ORAL at 01:04

## 2020-05-21 RX ADMIN — MULTIVITAMIN 15 ML: LIQUID ORAL at 08:30

## 2020-05-21 RX ADMIN — POTASSIUM CHLORIDE 20 MEQ: 29.8 INJECTION, SOLUTION INTRAVENOUS at 21:08

## 2020-05-21 RX ADMIN — PIPERACILLIN AND TAZOBACTAM 3.38 G: 3; .375 INJECTION, POWDER, FOR SOLUTION INTRAVENOUS at 00:43

## 2020-05-21 RX ADMIN — ALBUTEROL SULFATE 2.5 MG: 2.5 SOLUTION RESPIRATORY (INHALATION) at 16:30

## 2020-05-21 RX ADMIN — SODIUM PHOSPHATE, MONOBASIC, MONOHYDRATE AND SODIUM PHOSPHATE, DIBASIC, ANHYDROUS 15 MMOL: 276; 142 INJECTION, SOLUTION INTRAVENOUS at 06:40

## 2020-05-21 RX ADMIN — FUROSEMIDE 40 MG: 10 INJECTION, SOLUTION INTRAMUSCULAR; INTRAVENOUS at 09:36

## 2020-05-21 RX ADMIN — ACETYLCYSTEINE 4 ML: 100 SOLUTION ORAL; RESPIRATORY (INHALATION) at 16:30

## 2020-05-21 RX ADMIN — ACETYLCYSTEINE 4 ML: 100 SOLUTION ORAL; RESPIRATORY (INHALATION) at 12:06

## 2020-05-21 RX ADMIN — DEXMEDETOMIDINE 0.4 MCG/KG/HR: 100 INJECTION, SOLUTION, CONCENTRATE INTRAVENOUS at 23:52

## 2020-05-21 ASSESSMENT — ACTIVITIES OF DAILY LIVING (ADL)
ADLS_ACUITY_SCORE: 22

## 2020-05-21 ASSESSMENT — MIFFLIN-ST. JEOR: SCORE: 1881.75

## 2020-05-21 NOTE — PROGRESS NOTES
Major Shift Events: Tmax 100.6, PRN tylenol ordered. Remains on EEG. Minimal response to stimuli. Cough reflex intact. Sedation weaned as able, but patient violently coughing with turns and requiring frequent suctioning. ST, minimal ectopy. Warm, palpable pulses. MAPS within goal. Very coarse with thick secretions. 1 large BM. TF advanced to goal 55/hr, tolerating appropriately. Matson with 60-80/hr. Dropped off to 0 early AM, attempted to flush matson but met resistance and pressure. Matson exchanged. Thermogard removed at bedside at 0300 by Cards 2. Site soft, no hematoma. Labs supplemented PRN. Remains on Propofol and Fentanyl.    Plan: Continue plan of care.     For vital signs and complete assessments, please see documentation flowsheets.

## 2020-05-21 NOTE — PROGRESS NOTES
Preliminary Video EEG impression on May 20-21, 2020  Until 6am     Full report to follow. Please look in inpatient chart, under procedure section.     EEG consist of generalized delta theta slowing with superimposed generalized periodic discharges 0.25hz-2 hz in frequency, there is AP lag in BP montage, up to 70 uV, and noted in 50-75% of the record starting overnight.     No clear electrographic seizures or epileptiform discharges are seen.      EEG is consistent with a severe diffuse encephalopathy with superimposed generalized periodic pattern discharges most likely to be an encephalopathic process. We will monitor closely EEG for concerning changes.     If events of interest are noted, please press the event button. Clinical correlation is advised.     Antonella Franklin MD  Staff Epilepsy Neurologist    [phone]

## 2020-05-21 NOTE — PROCEDURES
Procedure:  Bronchoscopy  Indication:  Question mucous plugging  Providers:  Mariano Castelan MD  Medications: continued on ICU gtt medications (see MAR), Versed 4 mg.   Time Out:  Performed    The patient's medical record has been reviewed.  The indication for the procedure was reviewed and is mucous plugging.  The necessary history and physical examination was performed.  The risks, benefits and alternatives of the procedure were discussed with the the patient's representative in detail and had the opportunity to ask questions.  All questions were answered and verbal and written informed consent was obtained.  The proposed procedure and the patient's identification were verified prior to the procedure by the physician and the nurse and respiratory therapist.      After clinical evaluation and reviewing the indication, risks, alternatives and benefits of the procedure the patient was deemed to be in satisfactory condition to undergo the procedure.      Immediately before administration of medications the patient was re-assessed for adequacy to receive sedatives including the heart rate, respiratory rate, mental status, oxygen saturation, blood pressure and adequacy of pulmonary ventilation. These same parameters were continuously monitored throughout the procedure.     Maneuvers / Procedure:     The bronchoscope was inserted through the mouth via ETT.  The cords were anesthetized with lidocaine. Upper airway structures, vocal cords (anatomy and function) appear to be normal.    A complete airway examination was performed from the distal trachea to the subsegmental level in each lobe of both lungs. There were no endobronchial lesion and the mucosa was edematous and erythematous most prominently in the left mainstem. There was a mild amount of thin secretions most prominent in the RUL and RLL. They were therapeutically suctioned without issue.     A BAL was performed in the [facility].  A total of 120 ml of fluid  was instilled.   A total of 50 ml of fluid was returned.   The samples were combined and sent for immunocompromised battery.     Total sedation time: 15 minutes of 1:1 continuous monitoring    Complications: None    Estimated Blood Loss: 0 ml    The patient tolerated the procedure well without undue discomfort, hypotension or arrhythmia, or hypoxia.    The procedure was performed in ICU.    Mariano Castelan MD  Pulmonary & Critical Care Medicine  [facility]   Pager: [phone]

## 2020-05-21 NOTE — CONSULTS
[facility], [place]  Consult Note - Pulmonary Critical Care        Date of Admission:  5/17/2020  Consult Requested by: Dr. Alanis  Reason for Consult: Transient high peak airway pressures and possible bronchoscopy    Assessment & Plan   Scot Bishop is a 39 year old male admitted on 5/17/2020 with out of hospital cardiac arrest requiring VA-ECMO support decanulated 5/19 with transient high peak airway pressures 5/20.    1. High Peak inspiratory pressures  2. Acute hypoxic respiratory failure  3. Aspiration pneumonia  4. COVID negative  Patient developed acute rise of his peak inspiratory pressure requiring bag valve ventilation and respiratory lavage.  Clinical presentation is more consistent with mucous plugging and will plan to complete bronchoscopy with BAL.  CXR did not demonstrate pneumothorax, or mainstem migration of the ET tube.  - Bronchoscopy with BAL  - follow up cultures and cell counts  - Agree with empiric ABx    5. Refractory VF arrest    6. S/p NAZIA to proximal-mid LAD  7. Sinus tachycardia   8. Shock liver  9. GIB  Management per primary    The patient's care was discussed with the Attending Physician, Dr. Castelan.    Chavez Diehl MD  [facility], [place]  Pager: [phone]  Please see sticky note for cross cover information  ______________________________________________________________________    Chief Complaint   Out of hospital asystole / VF cardiac arrest  High peak airway pressures      History is obtained from the patient and paper chart    History of Present Illness   Scot Bishop is a 39 year old male admitted on 5/17/2020 in the setting of an asystole / VF cardiac arrest and placed on ECMO and underwent successful aspiration thrombectomy of the proximal LAD.  Patient was decanulated 5/19.  However on 5/20 patient developed high peak airway pressures from 10 rising to 30 patient requiring bag valve ventilation with lavage  that produced sputum and improvement of his airway pressures.  The pulmonary critical care service was consulted for ventilator management and possible bronchoscopy.    Review of Systems   Unable to obtain due to intubation    Past Medical History    I have reviewed this patient's medical history and updated it with pertinent information if needed.   No past medical history on file.    Past Surgical History   I have reviewed this patient's surgical history and updated it with pertinent information if needed.  Past Surgical History:   Procedure Laterality Date     CV CORONARY ANGIOGRAM N/A 5/17/2020    Procedure: Coronary Angiogram;  Surgeon: Chadd Newby MD;  Location: [facility] CARDIAC CATH LAB     CV EXTRACORPERAL MEMBRANE OXYGENATION N/A 5/17/2020    Procedure: Extracorporeal Membrance Oxygenation;  Surgeon: Chadd Newby MD;  Location: [facility] CARDIAC CATH LAB     CV INTRA-AORTIC BALLOON PUMP INSERTION N/A 5/17/2020    Procedure: Intra-Aortic Balloon Pump Insertion;  Surgeon: Chadd Newby MD;  Location: [facility] CARDIAC CATH LAB     CV PCI STENT DRUG ELUTING N/A 5/17/2020    Procedure: Percutaneous Coronary Intervention Stent Drug Eluting;  Surgeon: Chadd Newby MD;  Location: [facility] CARDIAC CATH LAB     REMOVE EXTRACORPORAL MEMBRANE OXYGENATOR ADULT (OUTSIDE OR) N/A 5/19/2020    Procedure: ECMO Decannulation at Bedside;  Surgeon: Mariano Workman MD;  Location:  OR       Social History   I have reviewed this patient's social history and updated it with pertinent information if needed.  Social History     Tobacco Use     Smoking status: Not on file   Substance Use Topics     Alcohol use: Not on file     Drug use: Not on file       Family History   I have reviewed this patient's family history and updated it with pertinent information if needed.   No family history on file.    Medications   I have reviewed this patient's current medications    Allergies   Allergies not on  file    Physical Exam   Vital Signs: Temp: 100.4  F (38  C) Temp src: Esophageal     Heart Rate: 133 Resp: 18 SpO2: 99 % O2 Device: Mechanical Ventilator    Weight: 226 lbs 13.65 oz    GEN: intubated and sedated  CV:  RRR no murmurs or extra heart sounds appreciated  Pulm:  CTA with adequate air movement synchronous with ventilator.  No wheezes, rales or rhonchi appreciated.  GI: non distended soft   : Silva in place    Data   I personally reviewed  CXR   1.  Endotracheal tube with the tip projects 7.1 cm above chaim,  consider advancement for 3 to 4 cm. Otherwise stable supportive lines  and tubes.  2.  Interval increased right perihilar and the upper lobe hazy  opacities, which may represent pulmonary edema versus worsening of  Infection.  .  Most Recent 3 CBC's:  Recent Labs   Lab Test 05/21/20  0437 05/20/20  1559 05/20/20  0356   WBC 12.9* 14.2* 16.9*   HGB 7.5* 8.2* 7.1*   MCV 93 92 93   * 126* 132*     Most Recent 3 BMP's:  Recent Labs   Lab Test 05/21/20  0437 05/21/20  0007 05/20/20  1559 05/20/20  0356   *  --  145* 144   POTASSIUM 4.1 3.3* 3.5 4.4   CHLORIDE 116*  --  112* 114*   CO2 26  --  23 23   BUN 39*  --  38* 36*   CR 1.32*  --  1.63* 1.93*   ANIONGAP 4  --  10 7   TEN 8.1*  --  8.0* 7.6*   *  --  123* 130*  130*     Most Recent ABG:  Recent Labs   Lab Test 05/21/20  0909   PH 7.43   PO2 80   PCO2 40   HCO3 26   LINDSEY 1.6

## 2020-05-21 NOTE — PROGRESS NOTES
Cardiology Progress Note  Scot Bishop MRN: [id number]  Age: 39 year old, : 1980  Date: 2020            Assessment and Plan:     Scot Bishop is a 39 year old male who was admitted on 2020 for cardiac arrest. Pt reportedly had chest pain that started on 20. He was using Drano on his pipes at home and did not initially seek medical attention for CP and SOB since it said on the box that Drano can cause those symptoms. He took a shower and had syncopal episode after coming out of the shower. EMS was called; initial rhythm asystole. With chest compressions pt eventually had PEA and then eventually had VF in the field. After multiple cycles of epinephrine had ROSC. He was intubated in the field.   Pt was brought to [facility] ED where he regained a pulse and was brought to the Cath Lab for coronary angiogram. Initially, BP was 90s/60s as he was being transported from ED but he had recurrent cardiac arrest on arrival to Cath Lab. ECG showed ST elevation in aVR. He was promptly placed on VA ECMO. He had thrombotic occlusion of proximal LAD and underwent successful aspiration thrombectomy and PCI w/ NAZIA of proximal and mid LAD.     Interval events: s/p IABP removal yesterday. Received tylenol once for tmax 100.6F. Attempted to wean sedation overnight but having violent coughing episodes on lower doses of sedation.     Today's plan:   -add mucomyst and albuterol for secretions   -pulmonary consult for bronchoscopy   -wean vent as able   -wean propofol as able   -add precedex if needed for agitation   -lasix 40 mg IV this AM   -monitor I/O  -hgb 7.5 this AM; continue to monitor - will transfuse if <7    Neurology: Intubated, sedated. Initial CT head negative. EEG this AM showed encephalopathy, no seizures. Cooled to 34 degrees on arrival; rewarmed  AM.   -continue fentanyl for sedation   -wean propofol as able   -off versed since  AM  -RASS goal -1 to -2     Cardiovascular / Hemodynamics: Refractory VF arrest    S/p NAZIA to proximal-mid LAD  Sinus tachycardia   Peripheral VA ECMO inserted for cardiac arrest. LA 16. Received 1.5L fluid bolus last night for ongoing tachycardia, low UOP. EKG showed sinus tachycardia w/ lateral Torri - unchanged from previous and troponin recheck downtrending. Suspect ongoing tachycardia d/t evolving MI and post-arrest state.  TTE 5/20/20: EF 45-50%, RV normal   EKG: Sinus tachycardia w/ lateral Torri   -s/p ECMO decannulation at bedside on 5/19  -continue ASA 81mg and ticagrelor 90mg BID   -MAP goal 65-90; nipride gtt PRN    -hold statin for now given likely hepatic injury during arrest  -hold ACE/ARB for now given likely reduced renal fxn after arrest  -holding beta blocker given shock    Pulmonary: Acute hypoxic respiratory failure  COVID negative  Admission CT chest showed bilateral consolidations c/w aspiration PNA. Violent coughing episodes w/ decreased sedation. Having thick secretions that have required bagging by RT.   Vent settings: 15/500/10/50%  CXR: Increased pulm edema. ETT 7 cm above the chaim - advanced this AM. Lines in stable position.   -add mucomyst and albuterol nebs for thick secretions   -pulmonary consult for possible bronchoscopy   -vanc/zosyn for asp PNA   -wean vent as able  -daily CXR  -Q12h ABGs and PRN   -consider scheduled duonebs if signs of lung dz, currently PRN       GI and Nutrition: Shock liver  GIB, resolved   -monitor BID LFTs  -continue TF   -bowel regimen   -GI Prophylaxis: Protonix BID for UGIB   Renal, Fluid and Electrolytes: Acute kidney injury   Cr improved to 1.32 this AM. 3L UOP yesterday after lasix.   -lasix 40 mg IV this AM to keep net neg 500mL to 1L  -monitor urine output  -maintain K>4 and Mg>2    Infectious Disease: Aspiration pneumonia  Leukocytosis, resolved  No signs of infection. Leukocytosis c/w arrest. Blood cultures collected - negative thus far. Grew Staph aureus in sputum cx.  "  -vancomycin/zosyn x5 days (5/17 - ) for asp PNA  -extend vancomycin for S aureus in sputum     -monitor for signs of infection given lines and leukocytosis   Hematology and Oncology: Receiving ASA/ticagrelor for NAZIA. Transfused 1 unit PRBCs yesterday. Hgb 7.5 this AM. No signs of active bleeding.   -transfuse for Hgb<7   -DVT PPX: subcutaneous heparin    Endocrinology: No known medical history. BG elevated.  -not requiring insulin gtt  -f/u HgbA1c    Lines:       R radial arterial line May 17, 2020  ETT May 17, 2020  Silva catheter May 17, 2020  OG tube May 17, 2020  Restraint: not currently needed    Current lines are required for patient management       Family update by me today: Yes     Code Status: Full code     The pt was discussed and evaluated with Dr. Alanis, attending physician, who agrees with the assessment and plan above.     Kala Chiang, APRN CNP          Objective     Temp 99.3  F (37.4  C)   Resp 14   Ht 1.676 m (5' 6\")   Wt 102.9 kg (226 lb 13.7 oz)   SpO2 98%   BMI 36.62 kg/m    Temp:  [97.9  F (36.6  C)-100.6  F (38.1  C)] 99.3  F (37.4  C)  Heart Rate:  [118-138] 122  Resp:  [14-28] 14  MAP:  [62 mmHg-100 mmHg] 78 mmHg  Arterial Line BP: ()/(45-75) 114/61  FiO2 (%):  [45 %-50 %] 45 %  SpO2:  [92 %-100 %] 98 %  Wt Readings from Last 2 Encounters:   05/21/20 102.9 kg (226 lb 13.7 oz)     I/O last 3 completed shifts:  In: 3194 [I.V.:1364; NG/GT:440]  Out: 3368 [Urine:3368]      GENERAL APPEARANCE: Intubated, sedated. NAD.  HEENT: No icterus, PERRL 2 mm, ETT in place, OG tube in place  CARDIOVASCULAR: regular rhythm, normal S1 and S2, no S3 or S4 and no murmur, click or rub. Normal PMI. Pulses dopplerable.  RESP: Coarse bilaterally. Mechanical ventilation.    GASTRO: Soft, bowel sounds hypoactive but present  GENITOURINARY: Silva in place.  EXTREMITIES: Warm, +1 edema, pulses dopplered.  NEURO: Sedated and intubated, Pupils equal and reactive, 2 mm. Fent and propofol for " sedation.  INTEGUMENTARY: No rashes. Line sites CDI  LINES/TUBES/DRAINS: R radial Arterial line. ETT. OG. Silva Catheter.          Data:     Vent Settings:  Resp: 14 SpO2: 98 % O2 Device: Mechanical Ventilator      Arterial Blood Gas:   Recent Labs   Lab 05/21/20  0909 05/21/20 0437 05/21/20  0007 05/20/20 2022   PH 7.43 7.42 7.43 7.43   PCO2 40 42 40 40   PO2 80 85 116* 115*   HCO3 26 27 27 26   O2PER 45 50 50 50       Vitals:    05/19/20 0000 05/20/20 0000 05/21/20 0600   Weight: 105.5 kg (232 lb 9.4 oz) 105 kg (231 lb 7.7 oz) 102.9 kg (226 lb 13.7 oz)   I/O last 3 completed shifts:  In: 3194 [I.V.:1364; NG/GT:440]  Out: 3368 [Urine:3368]  Recent Labs   Lab 05/21/20 0437 05/21/20 0007 05/20/20  1559   *  --  145*   POTASSIUM 4.1 3.3* 3.5   CHLORIDE 116*  --  112*   CO2 26  --  23   ANIONGAP 4  --  10   *  --  123*   BUN 39*  --  38*   CR 1.32*  --  1.63*   TEN 8.1*  --  8.0*     No components found for: URINE   Recent Labs   Lab 05/20/20 0356 05/19/20 2205 05/19/20  1550   * 442* 537*   * 525* 592*   BILITOTAL 0.6 0.6 0.5   ALBUMIN 2.6* 2.9* 3.0*   PROTTOTAL 5.5* 5.7* 5.9*   ALKPHOS 44 45 44     Temp: 99.3  F (37.4  C) Temp src: EsophagealTemp  Min: 97.9  F (36.6  C)  Max: 100.6  F (38.1  C)   Recent Labs   Lab 05/21/20 0437 05/20/20  1559 05/20/20 0356 05/19/20 2205 05/19/20  1550   WBC 12.9* 14.2* 16.9* 16.3* 11.7*   HGB 7.5* 8.2* 7.1* 8.1* 9.0*   HCT 22.7* 24.7* 22.4* 24.4* 27.3*   MCV 93 92 93 93 92   RDW 13.6 13.3 13.4 13.6 13.4   * 126* 132* 139* 121*     Recent Labs   Lab 05/21/20  0437 05/20/20  0356 05/19/20 2205 05/19/20  1550 05/19/20  0952 05/19/20  0415   INR 1.19* 1.29* 1.24* 1.25* 1.29* 1.40*   PTT  --  30 36 69* 69* 71*     Recent Labs   Lab 05/21/20 0437 05/20/20  1559 05/20/20  0356 05/19/20 2205 05/19/20  1924   * 123* 130*  130* 141*  135* 127*       All imaging personally reviewed:  Recent Results (from the past 24 hour(s))    Echocardiogram Complete    Narrative    [id number]  [id number]  [id number]  [id number]^GRIS^YOSHI           [facility],[place]  Echocardiography Laboratory  [address] [place] [address]     Name: FAINA FORRESTER  MRN: [id number]  : 1980  Study Date: 2020 08:16 AM  Age: 39 yrs  Gender: Male  Patient Location: [facility]  Reason For Study: Cardiac Arrest  Ordering Physician: YOSHI LANDRY  Performed By: Denisse Johnson RDCS     BSA: 2.1 m2  Height: 66 in  Weight: 231 lb  BP: 132/58 mmHg  _____________________________________________________________________________  __        Procedure  Complete Portable Echo Adult. Technically difficult study.Extremely poor  acoustic windows.  _____________________________________________________________________________  __        Interpretation Summary  VA ECMO at 3.7L/min. Technically difficult study. Extremely poor acoustic  windows.  Severely (EF <30%) reduced left ventricular function on extremely limited  views.  The right ventricle cannot be assessed.  No pericardial effusion is present.  Previous study not available for comparison.  _____________________________________________________________________________  __        Left Ventricle  Left ventricular wall thickness cannot evaluate. Left ventricular size is  normal. Severely (EF <30%) reduced left ventricular function is present. Left  ventricular diastolic function is not assessable. Severe diffuse hypokinesis  is present.     Right Ventricle  The right ventricle cannot be assessed. Right ventricular function cannot be  assessed due to poor image quality.     Mitral Valve  The mitral valve cannot be assessed.        Aortic Valve  The aortic valve opens with each cardiac cycle. On Doppler interrogation,  there is no significant stenosis or regurgitation.     Tricuspid Valve  The tricuspid valve cannot be assessed. Pulmonary artery systolic pressure  cannot be assessed.     Pulmonic  Valve  The pulmonic valve cannot be assessed.     Vessels  The aorta root cannot be assessed. The thoracic aorta cannot be assessed.  Venous ECMO cannula is visualized traversing the IVC.     Pericardium  No pericardial effusion is present.     Compared to Previous Study  Previous study not available for comparison.     _____________________________________________________________________________  __                       Report approved by: Ashlee Izquierdo 05/18/2020 09:18 AM                 _____________________________________________________________________________  __                Medications     Current Facility-Administered Medications   Medication     0.9% sodium chloride BOLUS     acetaminophen (TYLENOL) tablet 650 mg    Or     acetaminophen (TYLENOL) Suppository 650 mg     acetylcysteine (MUCOMYST) 10 % nebulizer solution 4 mL     albuterol (PROVENTIL) neb solution 2.5 mg     artificial tears ophthalmic ointment     aspirin (ASA) chewable tablet 81 mg     calcium chloride in  mL intermittent infusion 1 g     calcium chloride injection 1 g     dextrose 10% infusion     glucose gel 15-30 g    Or     dextrose 50 % injection 25-50 mL    Or     glucagon injection 1 mg     fentaNYL (SUBLIMAZE) bolus from infusion pump-ADULT 50 mcg     fentaNYL (SUBLIMAZE) infusion     heparin ANTICOAGULANT injection 5,000 Units     heparin lock flush 10 UNIT/ML injection 2-5 mL     heparin lock flush 10 UNIT/ML injection 5-10 mL     heparin lock flush 10 UNIT/ML injection 5-10 mL     ipratropium - albuterol 0.5 mg/2.5 mg/3 mL (DUONEB) neb solution 3 mL     lidocaine (LMX4) cream     lidocaine (LMX4) cream     lidocaine (LMX4) cream     lidocaine 1 % 0.1-1 mL     lidocaine 1 % 0.1-1 mL     lidocaine 1 % 0.1-1 mL     lidocaine 1% with EPINEPHrine 1:100,000 30 mL, bupivacaine (MARCAINE) 30 mL     magnesium sulfate 2 g in water intermittent infusion     magnesium sulfate 4 g in 100 mL sterile water (premade)      midazolam (VERSED) injection 2 mg     multivitamins w/minerals (CERTAVITE) liquid 15 mL     naloxone (NARCAN) injection 0.1-0.4 mg     pantoprazole (PROTONIX) 40 mg IV push injection     piperacillin-tazobactam (ZOSYN) 3.375 g vial to attach to  mL bag     potassium chloride (KLOR-CON) Packet 20-40 mEq     potassium chloride 10 mEq in 100 mL intermittent infusion with 10 mg lidocaine     potassium chloride 10 mEq in 100 mL sterile water intermittent infusion (premix)     potassium chloride 20 mEq in 50 mL intermittent infusion     potassium chloride ER (KLOR-CON M) CR tablet 20-40 mEq     propofol (DIPRIVAN) infusion     senna-docusate (SENOKOT-S/PERICOLACE) 8.6-50 MG per tablet 1 tablet     sodium chloride (PF) 0.9% PF flush 10-20 mL     sodium chloride (PF) 0.9% PF flush 3 mL     sodium chloride (PF) 0.9% PF flush 3 mL     sodium chloride (PF) 0.9% PF flush 5-50 mL     sodium phosphate 10 mmol in D5W intermittent infusion     sodium phosphate 15 mmol in D5W intermittent infusion     sodium phosphate 20 mmol in D5W intermittent infusion     sodium phosphate 25 mmol in D5W intermittent infusion     ticagrelor (BRILINTA) tablet 90 mg     vancomycin (VANCOCIN) 1,750 mg in sodium chloride 0.9 % 250 mL intermittent infusion                        I have seen and examined the patient with the [facility] team. I agree with the assessment and plan of the note above.I have reviewed pertinent labs.     Jefferson Alanis MD  Interventional Cardiology  Pager: [phone]

## 2020-05-21 NOTE — PROGRESS NOTES
"Bronchoscopy Risk Assessment Guidelines      A. Patient symptoms to consider when assessing pulmonary TB risk are:    I. Cough greater than 3 weeks; and fever, hemoptysis, pleuritic chest    pain, weight loss greater than 10 lbs, night sweats, fatigue, infiltrates on    upper lobes or superior segments of lower lobes, cavitation on chest    x-ray.   B. Patient risk factors to consider when assessing pulmonary TB risk are:    I. Exposure to known TB case, foreign-born persons (within 5 years of    arrival to [place]), residence in a crowded setting (correctional facility,     long-term care center, etc.), persons with HIV or immunosuppression.    Patients with symptoms and risk factors should generally be considered \"suspect risk\" and bronchoscopies should be performed in airborne precautions.    This patient has NO KNOWN RISK of Tuberculosis (proceed with bronchoscopy)    Specimens sent: yes  Complications: None  Scope used: #2103250 Adult  Attending Physician: Dr. Flip Marie, RT on 5/21/2020 at 2:27 PM  "

## 2020-05-21 NOTE — PLAN OF CARE
Major Shift Events:  Bronched for increased PIP, got some small mucus plugs out. Weaned prop to 5. Fent still at 100. Pt continues upward gaze, does not follow commands but intermittently will withdraw to pain on upper and lower L extremities. Given 24 hour EEG break.  Will continue to Monitor.  Plan: Work towards extubation.  For vital signs and complete assessments, please see documentation flowsheets.

## 2020-05-21 NOTE — PROGRESS NOTES
[facility]  Palliative Care Daily Progress Note       Recommendations & Counseling       Patient seen and examined.  Reviewed with bedside nurse and unit RN care coordinator.     Family continues to be updated with patient's status. Have introduced the role of palliative care.      Offered support for patient's young children (ages 6 and 10).  Patient's father reports family is doing okay- of course harder in the setting of COVID-19 restrictions. Offering ongoing assist with pictures in room and listening to concerns as they arise.     Pt remains sedated. Cognitive recovery yet to be determined.         Assessments          Scot Bishop is a 39 year old male admitted on 5/17/2020 via EMS to [facility] ED post cardiac arrest.  Patient apparently reported chest pain 5/16 but did not seek medical attention for a day recalling he was working on his pipes at home and using Drano, with the warning on the box indicating it could cause shortness of breath or chest pain. The patient's significant other prevailed on him to go to the hospital, thereafter he experienced a syncopal episode and LOC.    EMS called and arrived with patient in asystole--> PEA--> VF in the field. Ongoing CPR and multiple cycles of epinephrine--> ROSC. Upon ED arrival he was promptly placed on ECM and found to have single-vessel CAD with thrombotic occlusion of proximal LAD--> successful aspiration thrombectomy of the proximal LAD.     Today, the patient was seen for palliative care follow up for support related to recent OOH cardiac arrest.    Prognosis, Goals, or Advance Care Planning was addressed today with: No.  Mood, coping, and/or meaning in the context of serious illness were addressed today: No.  Summary/Comments: see consult note from 5/18 for details. Pt remains intubated and sedated.    Dustin OCAMPO NP  Nurse Practitioner- Lead Advanced Practice Provider  [facility] Palliative  Medicine Consult Service   [phone]  TT spent: 29 minutes of which 20 minutes were spent in direct face to face contact with patient/family. Greater than 50% of time spent coordinating care.          Interval History:     Chart review/discussion with unit or clinical team members:   Pt was decannulated in past day. VSS. IABP support continues. Remains intubated.      Key Palliative Symptoms:  We are not helping to manage these symptoms currently in this patient.    Patient is on opioids: bowels not assessed today. Rectal tube in place           Review of Systems:     A system ROS was unable to be obtained secondary to critically ill and intubated status.          Medications:     I have reviewed this patient's medication profile and medications during this hospitalization.           Physical Exam:   Vitals were reviewed  Temp: 99.7  F (37.6  C) Temp src: Esophageal     Heart Rate: 129 Resp: 18 SpO2: 97 % O2 Device: Mechanical Ventilator   no appreciable change except as noted     General appearance: No acute distress.  Intubated and sedated.  In ICU bed. Continues with intra aortic balloon pump support in place.  HEENT: Orally intubated.  Mucous membranes moist.  NC/AT.  Cardiac: S1-S2, RRR, balloon pump noise across precordium.  Lungs: Mechanical breath sounds with crackles bilaterally on anterior exam.  Abdomen: Soft, nondistended, no masses palpable on brief exam.  Extremities: Trace bilateral lower extremity edema.  No cyanosis.  Peripheral pulse decreased.  Skin: Normal appearance without lesions on exposed surfaces.  Musculoskeletal: No swelling or redness of exposed joint surfaces.  Neurologic: Intubated and sedated.             Data Reviewed:     .  ROUTINE LABS (Last four results)  CMP  Recent Labs   Lab 05/20/20  1559 05/20/20  0356 05/19/20  2205 05/19/20  1924 05/19/20  1550 05/19/20  0952  05/19/20  0415  05/18/20  0353   * 144 143  --  144 145*  --  145*   < > 145*   POTASSIUM 3.5 4.4 4.0  --   3.7 3.5  --  3.7   < > 3.8   CHLORIDE 112* 114* 113*  --  114* 115*  --  116*   < > 115*   CO2 23 23 24  --  25 23  --  21   < > 22   ANIONGAP 10 7 6  --  5 7  --  8   < > 9   * 130*  130* 141*  135* 127* 104*  105* 132*  127*   < > 105*  118*   < > 129*  132*   BUN 38* 36* 29  --  24 23  --  24   < > 22   CR 1.63* 1.93* 1.81*  --  1.52* 1.32*  --  1.28*   < > 1.20   GFRESTIMATED 52* 42* 45*  --  56* 67  --  69   < > 75   GFRESTBLACK 60* 49* 53*  --  65 77  --  80   < > 87   TEN 8.0* 7.6* 7.6*  --  7.7* 7.6*  --  7.2*   < > 7.6*   MAG 2.1 2.2 2.3  --  2.3 2.4*  --  2.2   < > 2.4*   PHOS 3.4 4.6*  --   --   --   --   --  2.7  --  3.4   PROTTOTAL  --  5.5* 5.7*  --  5.9* 5.4*  --  5.0*   < > 6.2*   ALBUMIN  --  2.6* 2.9*  --  3.0* 2.9*  --  2.6*   < > 3.6   BILITOTAL  --  0.6 0.6  --  0.5 0.5  --  0.6   < > 0.7   ALKPHOS  --  44 45  --  44 39*  --  38*   < > 48   AST  --  382* 442*  --  537* 592*  --  630*   < > 1,777*   ALT  --  461* 525*  --  592* 590*  --  592*   < > 932*    < > = values in this interval not displayed.     CBC  Recent Labs   Lab 05/20/20  1559 05/20/20  0356 05/19/20  2205 05/19/20  1550   WBC 14.2* 16.9* 16.3* 11.7*   RBC 2.69* 2.40* 2.63* 2.97*   HGB 8.2* 7.1* 8.1* 9.0*   HCT 24.7* 22.4* 24.4* 27.3*   MCV 92 93 93 92   MCH 30.5 29.6 30.8 30.3   MCHC 33.2 31.7 33.2 33.0   RDW 13.3 13.4 13.6 13.4   * 132* 139* 121*     INR  Recent Labs   Lab 05/20/20  0356 05/19/20 2205 05/19/20  1550 05/19/20  0952   INR 1.29* 1.24* 1.25* 1.29*     Arterial Blood Gas  Recent Labs   Lab 05/20/20 2022 05/20/20  1559 05/20/20  0541 05/20/20  0242   PH 7.43 7.41 7.35 7.34*   PCO2 40 42 43 44   PO2 115* 134* 73* 70*   HCO3 26 27 24 25   O2PER 50 60 60.0 60.0%

## 2020-05-21 NOTE — PROGRESS NOTES
"[facility], [place]   Neurology Daily Note  Faina Bishop  [id number]  05/21/2020    Subjective: Has been withdrawing intermittently per nursing staff.  Intra-aortic balloon pump removed yesterday.    Objective   Physical Examination   Vitals: Temp 99.3  F (37.4  C)   Resp 28   Ht 1.676 m (5' 6\")   Wt 102.9 kg (226 lb 13.7 oz)   SpO2 98%   BMI 36.62 kg/m    Gen: intubated, sedated  HEENT: intubated, no icterus  Lungs: mechanically ventilated  Ext: edema present  Neuro: Intubated, examined with fentanyl and propofol sedation in place. Opens eyes to noxious stimulation. Pupils are 3mm and equal and reactive to light, eyes are conjugate.  No abnormal movements noted.  No movements of extremities.  For all four extremities, flutters eyes in response to noxious stimuli.    Investigations    Recent Labs   Lab 05/21/20  0437 05/21/20  0007 05/20/20  1559 05/20/20  0356  05/19/20  2205   WBC 12.9*  --  14.2* 16.9*  --  16.3*   HGB 7.5*  --  8.2* 7.1*  --  8.1*   MCV 93  --  92 93  --  93   *  --  126* 132*  --  139*   INR 1.19*  --   --  1.29*  --  1.24*   *  --  145* 144  --  143   POTASSIUM 4.1 3.3* 3.5 4.4  --  4.0   CHLORIDE 116*  --  112* 114*  --  113*   CO2 26  --  23 23  --  24   BUN 39*  --  38* 36*  --  29   CR 1.32*  --  1.63* 1.93*  --  1.81*   ANIONGAP 4  --  10 7  --  6   TEN 8.1*  --  8.0* 7.6*  --  7.6*   *  --  123* 130*  130*  --  141*  135*   ALBUMIN  --   --   --  2.6*  --  2.9*   PROTTOTAL  --   --   --  5.5*  --  5.7*   BILITOTAL  --   --   --  0.6  --  0.6   ALKPHOS  --   --   --  44  --  45   ALT  --   --   --  461*  --  525*   AST  --   --   --  382*  --  442*   TROPI 60.432*  --  65.060* 103.692*   < >  --     < > = values in this interval not displayed.     Results for FAINA BISHOP (MRN [id number])   Ref. Range 5/21/2020 04:37   Triglycerides Latest Ref Range: <150 mg/dL 609 (H)     CT head 5/17/2020:  Impression: No acute intracranial " "pathology.    EEG 5/20-21/2020 prelim read:  \"EEG is consistent with a severe diffuse encephalopathy with superimposed generalized periodic pattern discharges most likely to be an encephalopathic process. We will monitor closely EEG for concerning changes. \"    Assessment and Plan   Scot Bishop is a 39 year old male with past medical history including back pain who presented 5/17/2020 with asystole cardiac arrest.  Neurologic exam is limited by sedation at this time. Initial CT head without evidence of anoxic injury. EEG consistent with encephalopathy.  Neurologic exam improving with time.  Continues to be difficult to predict eventual post-cardiac arrest neurological outcome.    Recommendations  - Continue video EEG  - avoid hypotonic fluids  - wean sedation as able  - neurocritical care service will continue to follow    Patient was seen and discussed with Dr. Fidelia Herrera MD  Neurology PGY-2  Ascom [id number]    "

## 2020-05-21 NOTE — PHARMACY-VANCOMYCIN DOSING SERVICE
Pharmacy Vancomycin Note  Date of Service May 20, 2020  Patient's  1980   39 year old, male    Indication: Aspiration Pneumonia  Goal Trough Level: 15-20 mg/L  Day of Therapy: 4  Current Vancomycin regimen:  intermittent    Current estimated CrCl = Estimated Creatinine Clearance: 68.4 mL/min (A) (based on SCr of 1.63 mg/dL (H)).    Creatinine for last 3 days  2020: 10:05 PM Creatinine 1.21 mg/dL  2020:  3:53 AM Creatinine 1.20 mg/dL;  9:55 AM Creatinine 1.04 mg/dL;  4:06 PM Creatinine 1.03 mg/dL; 10:01 PM Creatinine 1.12 mg/dL  2020:  4:15 AM Creatinine 1.28 mg/dL;  9:52 AM Creatinine 1.32 mg/dL;  3:50 PM Creatinine 1.52 mg/dL; 10:05 PM Creatinine 1.81 mg/dL  2020:  3:56 AM Creatinine 1.93 mg/dL;  3:59 PM Creatinine 1.63 mg/dL    Recent Vancomycin Levels (past 3 days)  2020:  5:48 PM Vancomycin Level 6.9 mg/L    Vancomycin IV Administrations (past 72 hours)                   vancomycin (VANCOCIN) 1,750 mg in sodium chloride 0.9 % 250 mL intermittent infusion (mg) 1,750 mg New Bag 20 1928     1,750 mg New Bag 20 1750                Nephrotoxins and other renal medications (From now, onward)    Start     Dose/Rate Route Frequency Ordered Stop    20 2030  vancomycin (VANCOCIN) 2,500 mg in sodium chloride 0.9 % 250 mL intermittent infusion      2,500 mg (central catheter)  over 60 Minutes Intravenous ONCE 20 0649  vancomycin place robert - receiving intermittent dosing      1 each Does not apply SEE ADMIN INSTRUCTIONS 20 0650      20 1800  piperacillin-tazobactam (ZOSYN) 3.375 g vial to attach to  mL bag      3.375 g  over 30 Minutes Intravenous EVERY 6 HOURS 20 1544 20 1759             Contrast Orders - past 72 hours (72h ago, onward)    None          Interpretation of levels and current regimen:  Trough level is  subtherapeutic    Has serum creatinine changed > 50% in last 72 hours: No  Urine output:   diminished urine output- prn diuresing with lasix  Renal Function: Stable    Plan:  1.  Redose with Vancomycin 2500 mg iv x 1 then intermittent dosing continued  2.  Pharmacy will check trough levels as appropriate in 1-3 Days.    3. Serum creatinine levels will be ordered daily for the first week of therapy and at least twice weekly for subsequent weeks.      Eliza Thomason, PharmD        .

## 2020-05-21 NOTE — PROGRESS NOTES
[facility], [facility] Nurse Inpatient Adult Pressure Injury Prevention Assessment: ECMO  Follow up    Positioning Tolerance: Fair  Date of ECMO cannulation: 5/17/2020; decannulated on 5/19  Presence of Ischemia: No  Location of ischemia:     Pressure Injury Prevention Interventions In Place:  Optifoam Dressing under ECMO Cannula, Z flow Positioner under head, Pillows for repositioning, TAPs Wedge Positioners in use, Heel off-loading boots, Pillows under calves for heel suspension and Mepilex Sacral Dressing   Current support surface: Standard  Low air loss mattress       Pressure Injury Prevention Interventions Added:  None        Plan of Care for Positioning and Pressure Injury Prevention  Reposition patient every 1-2 hours using TAP Wedges  Position head on Z flow positioner, mold indentation at areas of pressure points.  Pad ECMO IJ cannula with Optifoam (#[id number]) along face and scalp between skin and cannula and under Coban head wraps    Pad ECMO groin and chest cannula under rigid connectors with Optifoam or Soft cloth  Heel off-loading Boots at all times  Sacral Mepilex for Prevention, change every 5 days and prn  Low Air loss mattress    Patient History:   According to medical record: 39 year old male who was admitted on 5/17/2020.  Patient apparently reported chest pain that started yesterday.  Of note, he was working on his pipes at home and using Drano.  Because the Drano stated on the box that it could cause shortness of breath or chest pain, he did not seek immediate medical attention for his 10/10 substernal chest pain.  Patient's significant other convinced him to go to the hospital, and he took a shower.  After coming out of the shower, he apparently had a syncopal episode.  Initial rhythm on arrival of EMS was asystole.  With chest compressions patient eventually had PEA and then eventually had VF in the field.  He received multiple cycles of epinephrine and had ROSC.   He was also intubated in the field.    Current Diet / Nutrition:     Orders Placed This Encounter      NPO for Medical/Clinical Reasons Except for: No Exceptions      Output:    I/O last 3 completed shifts:  In: 2782.2 [I.V.:1302.2; NG/GT:380]  Out: 3344 [Urine:3344]  Containment: of urine/stool: Incontinence Protocol, Rectal Tube and Urinary Catheter    Risk Assessment:   Sensory Perception: 1-->completely limited  Moisture: 3-->occasionally moist  Activity: 1-->bedfast  Mobility: 1-->completely immobile  Nutrition: 2-->probably inadequate  Friction and Shear: 2-->potential problem  Burak Score: 10    Labs:    Recent Labs   Lab 05/21/20  0437  05/20/20  0356   ALBUMIN  --   --  2.6*   HGB 7.5*   < > 7.1*   INR 1.19*  --  1.29*   WBC 12.9*   < > 16.9*   CRP  --   --  100.0*    < > = values in this interval not displayed.       Focused Assessment:    R groin vac placed by MD on 5/19    Pressure Injury Present::No   Tongue: posterior surface laceration and bruise on left side (in picture bruise seen just at corner of lips).  Injury consistent with likely traumatic intubation per history.  5/21: nearly healed            Education provided to: nurse  Discussed importance of:their role in pressure injury prevention  Discussed plan of care with Nurse  WOC Nurse follow-up plan:signing off    Ragini Wolf RN CWOCN

## 2020-05-22 ENCOUNTER — APPOINTMENT (OUTPATIENT)
Dept: GENERAL RADIOLOGY | Facility: CLINIC | Age: 40
End: 2020-05-22
Attending: INTERNAL MEDICINE
Payer: MEDICAID

## 2020-05-22 ENCOUNTER — APPOINTMENT (OUTPATIENT)
Dept: GENERAL RADIOLOGY | Facility: CLINIC | Age: 40
End: 2020-05-22
Attending: NURSE PRACTITIONER
Payer: MEDICAID

## 2020-05-22 LAB
ALBUMIN SERPL-MCNC: 2.7 G/DL (ref 3.4–5)
ALBUMIN UR-MCNC: 100 MG/DL
ALP SERPL-CCNC: 87 U/L (ref 40–150)
ALT SERPL W P-5'-P-CCNC: 263 U/L (ref 0–70)
ANION GAP SERPL CALCULATED.3IONS-SCNC: 5 MMOL/L (ref 3–14)
ANION GAP SERPL CALCULATED.3IONS-SCNC: 7 MMOL/L (ref 3–14)
ANION GAP SERPL CALCULATED.3IONS-SCNC: 7 MMOL/L (ref 3–14)
APPEARANCE UR: ABNORMAL
AST SERPL W P-5'-P-CCNC: 212 U/L (ref 0–45)
BASE EXCESS BLDA CALC-SCNC: 0.4 MMOL/L
BASE EXCESS BLDA CALC-SCNC: 1 MMOL/L
BASE EXCESS BLDA CALC-SCNC: 3 MMOL/L
BASE EXCESS BLDA CALC-SCNC: 3.4 MMOL/L
BASE EXCESS BLDA CALC-SCNC: 3.9 MMOL/L
BASE EXCESS BLDA CALC-SCNC: 4.6 MMOL/L
BASE EXCESS BLDA CALC-SCNC: 4.8 MMOL/L
BILIRUB SERPL-MCNC: 0.9 MG/DL (ref 0.2–1.3)
BILIRUB UR QL STRIP: NEGATIVE
BLD PROD TYP BPU: NORMAL
BLD UNIT ID BPU: 0
BLOOD PRODUCT CODE: NORMAL
BPU ID: NORMAL
BUN SERPL-MCNC: 36 MG/DL (ref 7–30)
BUN SERPL-MCNC: 38 MG/DL (ref 7–30)
BUN SERPL-MCNC: 40 MG/DL (ref 7–30)
CALCIUM SERPL-MCNC: 7.8 MG/DL (ref 8.5–10.1)
CALCIUM SERPL-MCNC: 8.1 MG/DL (ref 8.5–10.1)
CALCIUM SERPL-MCNC: 8.5 MG/DL (ref 8.5–10.1)
CHLORIDE SERPL-SCNC: 117 MMOL/L (ref 94–109)
CHLORIDE SERPL-SCNC: 118 MMOL/L (ref 94–109)
CHLORIDE SERPL-SCNC: 119 MMOL/L (ref 94–109)
CO2 SERPL-SCNC: 24 MMOL/L (ref 20–32)
CO2 SERPL-SCNC: 26 MMOL/L (ref 20–32)
CO2 SERPL-SCNC: 28 MMOL/L (ref 20–32)
COLOR UR AUTO: YELLOW
CREAT SERPL-MCNC: 1.12 MG/DL (ref 0.66–1.25)
CREAT SERPL-MCNC: 1.29 MG/DL (ref 0.66–1.25)
CREAT SERPL-MCNC: 1.33 MG/DL (ref 0.66–1.25)
ERYTHROCYTE [DISTWIDTH] IN BLOOD BY AUTOMATED COUNT: 14 % (ref 10–15)
ERYTHROCYTE [DISTWIDTH] IN BLOOD BY AUTOMATED COUNT: 14.1 % (ref 10–15)
GFR SERPL CREATININE-BSD FRML MDRD: 66 ML/MIN/{1.73_M2}
GFR SERPL CREATININE-BSD FRML MDRD: 68 ML/MIN/{1.73_M2}
GFR SERPL CREATININE-BSD FRML MDRD: 81 ML/MIN/{1.73_M2}
GLUCOSE BLDC GLUCOMTR-MCNC: 112 MG/DL (ref 70–99)
GLUCOSE BLDC GLUCOMTR-MCNC: 138 MG/DL (ref 70–99)
GLUCOSE BLDC GLUCOMTR-MCNC: 157 MG/DL (ref 70–99)
GLUCOSE SERPL-MCNC: 111 MG/DL (ref 70–99)
GLUCOSE SERPL-MCNC: 142 MG/DL (ref 70–99)
GLUCOSE SERPL-MCNC: 147 MG/DL (ref 70–99)
GLUCOSE UR STRIP-MCNC: NEGATIVE MG/DL
GRAM STN SPEC: ABNORMAL
GRAM STN SPEC: ABNORMAL
HCO3 BLD-SCNC: 24 MMOL/L (ref 21–28)
HCO3 BLD-SCNC: 24 MMOL/L (ref 21–28)
HCO3 BLD-SCNC: 26 MMOL/L (ref 21–28)
HCO3 BLD-SCNC: 27 MMOL/L (ref 21–28)
HCO3 BLD-SCNC: 28 MMOL/L (ref 21–28)
HCO3 BLD-SCNC: 28 MMOL/L (ref 21–28)
HCO3 BLD-SCNC: 29 MMOL/L (ref 21–28)
HCT VFR BLD AUTO: 20.2 % (ref 40–53)
HCT VFR BLD AUTO: 21.9 % (ref 40–53)
HCT VFR BLD AUTO: 23.2 % (ref 40–53)
HGB BLD-MCNC: 6.4 G/DL (ref 13.3–17.7)
HGB BLD-MCNC: 7.2 G/DL (ref 13.3–17.7)
HGB BLD-MCNC: 7.5 G/DL (ref 13.3–17.7)
HGB UR QL STRIP: ABNORMAL
INTERPRETATION ECG - MUSE: NORMAL
INTERPRETATION ECG - MUSE: NORMAL
KETONES UR STRIP-MCNC: 5 MG/DL
LACTATE BLD-SCNC: 1.5 MMOL/L (ref 0.7–2)
LACTATE BLD-SCNC: 3.8 MMOL/L (ref 0.7–2)
LEUKOCYTE ESTERASE UR QL STRIP: ABNORMAL
MAGNESIUM SERPL-MCNC: 2.1 MG/DL (ref 1.6–2.3)
MAGNESIUM SERPL-MCNC: 2.2 MG/DL (ref 1.6–2.3)
MAGNESIUM SERPL-MCNC: 2.4 MG/DL (ref 1.6–2.3)
MCH RBC QN AUTO: 30.5 PG (ref 26.5–33)
MCH RBC QN AUTO: 30.5 PG (ref 26.5–33)
MCHC RBC AUTO-ENTMCNC: 31.7 G/DL (ref 31.5–36.5)
MCHC RBC AUTO-ENTMCNC: 32.3 G/DL (ref 31.5–36.5)
MCV RBC AUTO: 94 FL (ref 78–100)
MCV RBC AUTO: 96 FL (ref 78–100)
MUCOUS THREADS #/AREA URNS LPF: PRESENT /LPF
NITRATE UR QL: NEGATIVE
NSE SERPL-MCNC: 24.8 UG/L (ref 3.7–8.9)
NSE SERPL-MCNC: 35.9 UG/L (ref 3.7–8.9)
NSE SERPL-MCNC: 37.5 UG/L (ref 3.7–8.9)
O2/TOTAL GAS SETTING VFR VENT: 100 %
O2/TOTAL GAS SETTING VFR VENT: 50 %
O2/TOTAL GAS SETTING VFR VENT: 60 %
O2/TOTAL GAS SETTING VFR VENT: 70 %
O2/TOTAL GAS SETTING VFR VENT: 70 %
OXYHGB MFR BLD: 88 % (ref 92–100)
OXYHGB MFR BLD: 89 % (ref 92–100)
OXYHGB MFR BLD: 95 % (ref 92–100)
OXYHGB MFR BLD: 98 % (ref 92–100)
PCO2 BLD: 32 MM HG (ref 35–45)
PCO2 BLD: 33 MM HG (ref 35–45)
PCO2 BLD: 36 MM HG (ref 35–45)
PCO2 BLD: 38 MM HG (ref 35–45)
PCO2 BLD: 40 MM HG (ref 35–45)
PH BLD: 7.44 PH (ref 7.35–7.45)
PH BLD: 7.47 PH (ref 7.35–7.45)
PH BLD: 7.48 PH (ref 7.35–7.45)
PH BLD: 7.49 PH (ref 7.35–7.45)
PH BLD: 7.52 PH (ref 7.35–7.45)
PH UR STRIP: 6 PH (ref 5–7)
PHOSPHATE SERPL-MCNC: 2.7 MG/DL (ref 2.5–4.5)
PHOSPHATE SERPL-MCNC: 3.2 MG/DL (ref 2.5–4.5)
PHOSPHATE SERPL-MCNC: 3.7 MG/DL (ref 2.5–4.5)
PLATELET # BLD AUTO: 120 10E9/L (ref 150–450)
PLATELET # BLD AUTO: 147 10E9/L (ref 150–450)
PO2 BLD: 114 MM HG (ref 80–105)
PO2 BLD: 129 MM HG (ref 80–105)
PO2 BLD: 56 MM HG (ref 80–105)
PO2 BLD: 57 MM HG (ref 80–105)
PO2 BLD: 58 MM HG (ref 80–105)
PO2 BLD: 81 MM HG (ref 80–105)
PO2 BLD: 89 MM HG (ref 80–105)
POTASSIUM SERPL-SCNC: 3.4 MMOL/L (ref 3.4–5.3)
POTASSIUM SERPL-SCNC: 3.6 MMOL/L (ref 3.4–5.3)
POTASSIUM SERPL-SCNC: 3.8 MMOL/L (ref 3.4–5.3)
POTASSIUM SERPL-SCNC: 3.8 MMOL/L (ref 3.4–5.3)
PROT SERPL-MCNC: 6.2 G/DL (ref 6.8–8.8)
RBC # BLD AUTO: 2.1 10E12/L (ref 4.4–5.9)
RBC # BLD AUTO: 2.46 10E12/L (ref 4.4–5.9)
RBC #/AREA URNS AUTO: >182 /HPF (ref 0–2)
SODIUM SERPL-SCNC: 150 MMOL/L (ref 133–144)
SOURCE: ABNORMAL
SP GR UR STRIP: 1.03 (ref 1–1.03)
SPECIMEN SOURCE: ABNORMAL
TRANSFUSION STATUS PATIENT QL: NORMAL
TRANSFUSION STATUS PATIENT QL: NORMAL
TRIGL SERPL-MCNC: 440 MG/DL
TROPONIN I SERPL-MCNC: 19.03 UG/L (ref 0–0.04)
TROPONIN I SERPL-MCNC: 26.46 UG/L (ref 0–0.04)
URATE CRY #/AREA URNS HPF: ABNORMAL /HPF
UROBILINOGEN UR STRIP-MCNC: NORMAL MG/DL (ref 0–2)
WBC # BLD AUTO: 12.3 10E9/L (ref 4–11)
WBC # BLD AUTO: 15.9 10E9/L (ref 4–11)
WBC #/AREA URNS AUTO: 5 /HPF (ref 0–5)

## 2020-05-22 PROCEDURE — 93010 ELECTROCARDIOGRAM REPORT: CPT | Performed by: INTERNAL MEDICINE

## 2020-05-22 PROCEDURE — 00000146 ZZHCL STATISTIC GLUCOSE BY METER IP

## 2020-05-22 PROCEDURE — 25000132 ZZH RX MED GY IP 250 OP 250 PS 637: Performed by: INTERNAL MEDICINE

## 2020-05-22 PROCEDURE — 85027 COMPLETE CBC AUTOMATED: CPT

## 2020-05-22 PROCEDURE — 83605 ASSAY OF LACTIC ACID: CPT | Performed by: NURSE PRACTITIONER

## 2020-05-22 PROCEDURE — 25000128 H RX IP 250 OP 636: Performed by: INTERNAL MEDICINE

## 2020-05-22 PROCEDURE — 25800030 ZZH RX IP 258 OP 636: Performed by: NURSE PRACTITIONER

## 2020-05-22 PROCEDURE — 71045 X-RAY EXAM CHEST 1 VIEW: CPT | Mod: 77

## 2020-05-22 PROCEDURE — P9016 RBC LEUKOCYTES REDUCED: HCPCS | Performed by: STUDENT IN AN ORGANIZED HEALTH CARE EDUCATION/TRAINING PROGRAM

## 2020-05-22 PROCEDURE — 25800030 ZZH RX IP 258 OP 636

## 2020-05-22 PROCEDURE — 87077 CULTURE AEROBIC IDENTIFY: CPT | Performed by: INTERNAL MEDICINE

## 2020-05-22 PROCEDURE — 80048 BASIC METABOLIC PNL TOTAL CA: CPT

## 2020-05-22 PROCEDURE — 83735 ASSAY OF MAGNESIUM: CPT | Performed by: NURSE PRACTITIONER

## 2020-05-22 PROCEDURE — 85027 COMPLETE CBC AUTOMATED: CPT | Performed by: NURSE PRACTITIONER

## 2020-05-22 PROCEDURE — 80053 COMPREHEN METABOLIC PANEL: CPT | Performed by: NURSE PRACTITIONER

## 2020-05-22 PROCEDURE — 93005 ELECTROCARDIOGRAM TRACING: CPT

## 2020-05-22 PROCEDURE — 71045 X-RAY EXAM CHEST 1 VIEW: CPT

## 2020-05-22 PROCEDURE — 87070 CULTURE OTHR SPECIMN AEROBIC: CPT | Performed by: INTERNAL MEDICINE

## 2020-05-22 PROCEDURE — 25000125 ZZHC RX 250: Performed by: INTERNAL MEDICINE

## 2020-05-22 PROCEDURE — 25800030 ZZH RX IP 258 OP 636: Performed by: INTERNAL MEDICINE

## 2020-05-22 PROCEDURE — 84100 ASSAY OF PHOSPHORUS: CPT

## 2020-05-22 PROCEDURE — 84100 ASSAY OF PHOSPHORUS: CPT | Performed by: STUDENT IN AN ORGANIZED HEALTH CARE EDUCATION/TRAINING PROGRAM

## 2020-05-22 PROCEDURE — 25000125 ZZHC RX 250: Performed by: STUDENT IN AN ORGANIZED HEALTH CARE EDUCATION/TRAINING PROGRAM

## 2020-05-22 PROCEDURE — 99233 SBSQ HOSP IP/OBS HIGH 50: CPT | Performed by: INTERNAL MEDICINE

## 2020-05-22 PROCEDURE — 84484 ASSAY OF TROPONIN QUANT: CPT | Performed by: NURSE PRACTITIONER

## 2020-05-22 PROCEDURE — 25000125 ZZHC RX 250

## 2020-05-22 PROCEDURE — P9041 ALBUMIN (HUMAN),5%, 50ML: HCPCS | Performed by: INTERNAL MEDICINE

## 2020-05-22 PROCEDURE — 85014 HEMATOCRIT: CPT | Performed by: STUDENT IN AN ORGANIZED HEALTH CARE EDUCATION/TRAINING PROGRAM

## 2020-05-22 PROCEDURE — 25000132 ZZH RX MED GY IP 250 OP 250 PS 637: Performed by: STUDENT IN AN ORGANIZED HEALTH CARE EDUCATION/TRAINING PROGRAM

## 2020-05-22 PROCEDURE — 84484 ASSAY OF TROPONIN QUANT: CPT

## 2020-05-22 PROCEDURE — 87205 SMEAR GRAM STAIN: CPT | Performed by: INTERNAL MEDICINE

## 2020-05-22 PROCEDURE — 84132 ASSAY OF SERUM POTASSIUM: CPT | Performed by: NURSE PRACTITIONER

## 2020-05-22 PROCEDURE — 84478 ASSAY OF TRIGLYCERIDES: CPT

## 2020-05-22 PROCEDURE — 20000004 ZZH R&B ICU UMMC

## 2020-05-22 PROCEDURE — 87040 BLOOD CULTURE FOR BACTERIA: CPT

## 2020-05-22 PROCEDURE — 25000125 ZZHC RX 250: Performed by: NURSE PRACTITIONER

## 2020-05-22 PROCEDURE — 81001 URINALYSIS AUTO W/SCOPE: CPT | Performed by: NURSE PRACTITIONER

## 2020-05-22 PROCEDURE — 99233 SBSQ HOSP IP/OBS HIGH 50: CPT | Mod: GC | Performed by: INTERNAL MEDICINE

## 2020-05-22 PROCEDURE — 82803 BLOOD GASES ANY COMBINATION: CPT | Performed by: NURSE PRACTITIONER

## 2020-05-22 PROCEDURE — 27210437 ZZH NUTRITION PRODUCT SEMIELEM INTERMED LITER: Performed by: DIETITIAN, REGISTERED

## 2020-05-22 PROCEDURE — 36415 COLL VENOUS BLD VENIPUNCTURE: CPT

## 2020-05-22 PROCEDURE — 85018 HEMOGLOBIN: CPT | Performed by: STUDENT IN AN ORGANIZED HEALTH CARE EDUCATION/TRAINING PROGRAM

## 2020-05-22 PROCEDURE — 25000128 H RX IP 250 OP 636

## 2020-05-22 PROCEDURE — 84100 ASSAY OF PHOSPHORUS: CPT | Performed by: NURSE PRACTITIONER

## 2020-05-22 PROCEDURE — 80048 BASIC METABOLIC PNL TOTAL CA: CPT | Performed by: STUDENT IN AN ORGANIZED HEALTH CARE EDUCATION/TRAINING PROGRAM

## 2020-05-22 PROCEDURE — 94003 VENT MGMT INPAT SUBQ DAY: CPT

## 2020-05-22 PROCEDURE — 25000128 H RX IP 250 OP 636: Performed by: STUDENT IN AN ORGANIZED HEALTH CARE EDUCATION/TRAINING PROGRAM

## 2020-05-22 PROCEDURE — 25800030 ZZH RX IP 258 OP 636: Performed by: STUDENT IN AN ORGANIZED HEALTH CARE EDUCATION/TRAINING PROGRAM

## 2020-05-22 PROCEDURE — G0463 HOSPITAL OUTPT CLINIC VISIT: HCPCS

## 2020-05-22 PROCEDURE — 82803 BLOOD GASES ANY COMBINATION: CPT | Performed by: INTERNAL MEDICINE

## 2020-05-22 PROCEDURE — 99232 SBSQ HOSP IP/OBS MODERATE 35: CPT | Performed by: NURSE PRACTITIONER

## 2020-05-22 PROCEDURE — 40000014 ZZH STATISTIC ARTERIAL MONITORING DAILY

## 2020-05-22 PROCEDURE — 94640 AIRWAY INHALATION TREATMENT: CPT | Mod: 76

## 2020-05-22 PROCEDURE — 25000132 ZZH RX MED GY IP 250 OP 250 PS 637

## 2020-05-22 PROCEDURE — 25000128 H RX IP 250 OP 636: Performed by: NURSE PRACTITIONER

## 2020-05-22 PROCEDURE — 82805 BLOOD GASES W/O2 SATURATION: CPT

## 2020-05-22 PROCEDURE — 83735 ASSAY OF MAGNESIUM: CPT | Performed by: STUDENT IN AN ORGANIZED HEALTH CARE EDUCATION/TRAINING PROGRAM

## 2020-05-22 PROCEDURE — 83735 ASSAY OF MAGNESIUM: CPT

## 2020-05-22 PROCEDURE — 40000275 ZZH STATISTIC RCP TIME EA 10 MIN

## 2020-05-22 PROCEDURE — 25000132 ZZH RX MED GY IP 250 OP 250 PS 637: Performed by: NURSE PRACTITIONER

## 2020-05-22 RX ORDER — MEROPENEM 1 G/1
1 INJECTION, POWDER, FOR SOLUTION INTRAVENOUS EVERY 8 HOURS
Status: DISCONTINUED | OUTPATIENT
Start: 2020-05-22 | End: 2020-05-29

## 2020-05-22 RX ORDER — VECURONIUM BROMIDE 1 MG/ML
5 INJECTION, POWDER, LYOPHILIZED, FOR SOLUTION INTRAVENOUS ONCE
Status: COMPLETED | OUTPATIENT
Start: 2020-05-22 | End: 2020-05-22

## 2020-05-22 RX ORDER — FUROSEMIDE 10 MG/ML
40 INJECTION INTRAMUSCULAR; INTRAVENOUS ONCE
Status: COMPLETED | OUTPATIENT
Start: 2020-05-22 | End: 2020-05-22

## 2020-05-22 RX ORDER — VECURONIUM BROMIDE 1 MG/ML
10 INJECTION, POWDER, LYOPHILIZED, FOR SOLUTION INTRAVENOUS ONCE
Status: DISCONTINUED | OUTPATIENT
Start: 2020-05-22 | End: 2020-05-23

## 2020-05-22 RX ORDER — DEXMEDETOMIDINE HYDROCHLORIDE 4 UG/ML
.2-1.2 INJECTION, SOLUTION INTRAVENOUS CONTINUOUS
Status: DISCONTINUED | OUTPATIENT
Start: 2020-05-22 | End: 2020-05-24

## 2020-05-22 RX ORDER — FENTANYL CITRATE 50 UG/ML
50 INJECTION, SOLUTION INTRAMUSCULAR; INTRAVENOUS ONCE
Status: COMPLETED | OUTPATIENT
Start: 2020-05-22 | End: 2020-05-22

## 2020-05-22 RX ORDER — ACETAZOLAMIDE 500 MG/5ML
500 INJECTION, POWDER, LYOPHILIZED, FOR SOLUTION INTRAVENOUS ONCE
Status: COMPLETED | OUTPATIENT
Start: 2020-05-22 | End: 2020-05-22

## 2020-05-22 RX ORDER — ALBUMIN, HUMAN INJ 5% 5 %
25 SOLUTION INTRAVENOUS ONCE
Status: COMPLETED | OUTPATIENT
Start: 2020-05-22 | End: 2020-05-22

## 2020-05-22 RX ORDER — MIDAZOLAM (PF) 1 MG/ML IN 0.9 % SODIUM CHLORIDE INTRAVENOUS SOLUTION
1-10 CONTINUOUS
Status: DISCONTINUED | OUTPATIENT
Start: 2020-05-22 | End: 2020-05-26

## 2020-05-22 RX ADMIN — KETAMINE HYDROCHLORIDE 50 MG/HR: 100 INJECTION, SOLUTION, CONCENTRATE INTRAMUSCULAR; INTRAVENOUS at 18:16

## 2020-05-22 RX ADMIN — MIDAZOLAM (PF) 1 MG/ML IN 0.9 % SODIUM CHLORIDE INTRAVENOUS SOLUTION 3 MG/HR: at 08:11

## 2020-05-22 RX ADMIN — POTASSIUM CHLORIDE 20 MEQ: 29.8 INJECTION, SOLUTION INTRAVENOUS at 22:48

## 2020-05-22 RX ADMIN — ACETYLCYSTEINE 4 ML: 100 SOLUTION ORAL; RESPIRATORY (INHALATION) at 07:53

## 2020-05-22 RX ADMIN — FENTANYL CITRATE 200 MCG/HR: 50 INJECTION INTRAVENOUS at 18:55

## 2020-05-22 RX ADMIN — MIDAZOLAM (PF) 1 MG/ML IN 0.9 % SODIUM CHLORIDE INTRAVENOUS SOLUTION 10 MG/HR: at 14:20

## 2020-05-22 RX ADMIN — Medication 40 MG: at 08:45

## 2020-05-22 RX ADMIN — MEROPENEM 1 G: 1 INJECTION, POWDER, FOR SOLUTION INTRAVENOUS at 15:11

## 2020-05-22 RX ADMIN — ASPIRIN 81 MG CHEWABLE TABLET 81 MG: 81 TABLET CHEWABLE at 08:35

## 2020-05-22 RX ADMIN — ALBUMIN HUMAN 25 G: 0.05 INJECTION, SOLUTION INTRAVENOUS at 05:14

## 2020-05-22 RX ADMIN — ACETYLCYSTEINE 4 ML: 100 SOLUTION ORAL; RESPIRATORY (INHALATION) at 16:05

## 2020-05-22 RX ADMIN — ACETYLCYSTEINE 4 ML: 100 SOLUTION ORAL; RESPIRATORY (INHALATION) at 20:08

## 2020-05-22 RX ADMIN — TICAGRELOR 90 MG: 90 TABLET ORAL at 08:35

## 2020-05-22 RX ADMIN — DEXMEDETOMIDINE 1.2 MCG/KG/HR: 100 INJECTION, SOLUTION, CONCENTRATE INTRAVENOUS at 15:26

## 2020-05-22 RX ADMIN — MEROPENEM 1 G: 1 INJECTION, POWDER, FOR SOLUTION INTRAVENOUS at 08:27

## 2020-05-22 RX ADMIN — VECURONIUM BROMIDE 5 MG: 1 INJECTION, POWDER, LYOPHILIZED, FOR SOLUTION INTRAVENOUS at 10:21

## 2020-05-22 RX ADMIN — Medication 40 MG: at 20:01

## 2020-05-22 RX ADMIN — ACETAMINOPHEN 650 MG: 325 TABLET ORAL at 21:37

## 2020-05-22 RX ADMIN — POTASSIUM CHLORIDE 20 MEQ: 29.8 INJECTION, SOLUTION INTRAVENOUS at 13:02

## 2020-05-22 RX ADMIN — ACETAMINOPHEN 650 MG: 325 TABLET ORAL at 11:23

## 2020-05-22 RX ADMIN — FENTANYL CITRATE 100 MCG/HR: 50 INJECTION INTRAVENOUS at 09:21

## 2020-05-22 RX ADMIN — FUROSEMIDE 40 MG: 10 INJECTION, SOLUTION INTRAMUSCULAR; INTRAVENOUS at 08:35

## 2020-05-22 RX ADMIN — SODIUM PHOSPHATE, MONOBASIC, MONOHYDRATE AND SODIUM PHOSPHATE, DIBASIC, ANHYDROUS 10 MMOL: 276; 142 INJECTION, SOLUTION INTRAVENOUS at 09:07

## 2020-05-22 RX ADMIN — HEPARIN SODIUM 5000 UNITS: 5000 INJECTION, SOLUTION INTRAVENOUS; SUBCUTANEOUS at 21:37

## 2020-05-22 RX ADMIN — POTASSIUM CHLORIDE 20 MEQ: 29.8 INJECTION, SOLUTION INTRAVENOUS at 17:30

## 2020-05-22 RX ADMIN — DEXMEDETOMIDINE 0.7 MCG/KG/HR: 100 INJECTION, SOLUTION, CONCENTRATE INTRAVENOUS at 05:21

## 2020-05-22 RX ADMIN — Medication 50 MCG: at 09:34

## 2020-05-22 RX ADMIN — MULTIVITAMIN 15 ML: LIQUID ORAL at 08:35

## 2020-05-22 RX ADMIN — Medication 50 MCG: at 16:44

## 2020-05-22 RX ADMIN — ACETAMINOPHEN 650 MG: 325 TABLET ORAL at 16:12

## 2020-05-22 RX ADMIN — ALBUTEROL SULFATE 2.5 MG: 2.5 SOLUTION RESPIRATORY (INHALATION) at 00:29

## 2020-05-22 RX ADMIN — Medication 50 MCG: at 16:12

## 2020-05-22 RX ADMIN — ACETAMINOPHEN 650 MG: 325 TABLET ORAL at 05:43

## 2020-05-22 RX ADMIN — HEPARIN SODIUM 5000 UNITS: 5000 INJECTION, SOLUTION INTRAVENOUS; SUBCUTANEOUS at 05:18

## 2020-05-22 RX ADMIN — Medication 50 MCG: at 13:58

## 2020-05-22 RX ADMIN — ACETYLCYSTEINE 4 ML: 100 SOLUTION ORAL; RESPIRATORY (INHALATION) at 00:29

## 2020-05-22 RX ADMIN — ACETAZOLAMIDE SODIUM 500 MG: 500 INJECTION, POWDER, LYOPHILIZED, FOR SOLUTION INTRAVENOUS at 19:58

## 2020-05-22 RX ADMIN — MIDAZOLAM (PF) 1 MG/ML IN 0.9 % SODIUM CHLORIDE INTRAVENOUS SOLUTION 8 MG/HR: at 22:48

## 2020-05-22 RX ADMIN — ALBUTEROL SULFATE 2.5 MG: 2.5 SOLUTION RESPIRATORY (INHALATION) at 04:12

## 2020-05-22 RX ADMIN — DEXMEDETOMIDINE 1.2 MCG/KG/HR: 100 INJECTION, SOLUTION, CONCENTRATE INTRAVENOUS at 11:19

## 2020-05-22 RX ADMIN — Medication 50 MCG: at 13:34

## 2020-05-22 RX ADMIN — HEPARIN SODIUM 5000 UNITS: 5000 INJECTION, SOLUTION INTRAVENOUS; SUBCUTANEOUS at 13:12

## 2020-05-22 RX ADMIN — ALBUTEROL SULFATE 2.5 MG: 2.5 SOLUTION RESPIRATORY (INHALATION) at 07:53

## 2020-05-22 RX ADMIN — TICAGRELOR 90 MG: 90 TABLET ORAL at 20:01

## 2020-05-22 RX ADMIN — ALBUTEROL SULFATE 2.5 MG: 2.5 SOLUTION RESPIRATORY (INHALATION) at 20:08

## 2020-05-22 RX ADMIN — VANCOMYCIN HYDROCHLORIDE 1750 MG: 10 INJECTION, POWDER, LYOPHILIZED, FOR SOLUTION INTRAVENOUS at 21:37

## 2020-05-22 RX ADMIN — ACETYLCYSTEINE 4 ML: 100 SOLUTION ORAL; RESPIRATORY (INHALATION) at 04:12

## 2020-05-22 RX ADMIN — FENTANYL CITRATE 50 MCG: 50 INJECTION INTRAMUSCULAR; INTRAVENOUS at 08:52

## 2020-05-22 RX ADMIN — MIDAZOLAM 2 MG: 1 INJECTION INTRAMUSCULAR; INTRAVENOUS at 10:23

## 2020-05-22 RX ADMIN — PIPERACILLIN AND TAZOBACTAM 3.38 G: 3; .375 INJECTION, POWDER, FOR SOLUTION INTRAVENOUS at 05:20

## 2020-05-22 RX ADMIN — Medication 50 MCG: at 10:20

## 2020-05-22 RX ADMIN — PIPERACILLIN AND TAZOBACTAM 3.38 G: 3; .375 INJECTION, POWDER, FOR SOLUTION INTRAVENOUS at 00:33

## 2020-05-22 RX ADMIN — POTASSIUM CHLORIDE 20 MEQ: 29.8 INJECTION, SOLUTION INTRAVENOUS at 05:52

## 2020-05-22 RX ADMIN — PROPOFOL 10 MCG/KG/MIN: 10 INJECTION, EMULSION INTRAVENOUS at 05:22

## 2020-05-22 RX ADMIN — ALBUTEROL SULFATE 2.5 MG: 2.5 SOLUTION RESPIRATORY (INHALATION) at 16:05

## 2020-05-22 RX ADMIN — FUROSEMIDE 40 MG: 10 INJECTION, SOLUTION INTRAMUSCULAR; INTRAVENOUS at 15:11

## 2020-05-22 ASSESSMENT — ACTIVITIES OF DAILY LIVING (ADL)
ADLS_ACUITY_SCORE: 26
ADLS_ACUITY_SCORE: 22
ADLS_ACUITY_SCORE: 26
ADLS_ACUITY_SCORE: 22
ADLS_ACUITY_SCORE: 22
ADLS_ACUITY_SCORE: 26

## 2020-05-22 ASSESSMENT — MIFFLIN-ST. JEOR: SCORE: 1883.75

## 2020-05-22 NOTE — PROGRESS NOTES
Cardiology Critical Care Note - Cardiology  Jefferson Alanis M.D.  Critical Care Diagnoses:  Acute renal failure  Acute myocardial infarction of the LAD  Pneumonia   hypoxia      Critical Care management intervention:  Given  40 mg iv lasix   Stopped propofol  Started 10 mg of versed and hour  0.7 dex  Started vecuronium for paralysis  Increased PEEP at 12  Decreased FiO2 to 70  And increased the rate to 20br/min      Spent 45 minutes on critical care management on this patient.    .      Professor Zeke FUNES  Interventional Cariology and Cardiac Critical Care

## 2020-05-22 NOTE — PROGRESS NOTES
Care Coordinator Progress Note    Admission Date/Time:  5/17/2020  Attending MD:  Jessica, Cardiologist, *    Data  Chart reviewed, discussed with interdisciplinary team.   Patient was admitted for:    ST elevation MI (STEMI) (H)  Cardiac arrest (H).     Assessment  Pt admitted on 5/17/2020 with out of hospital cardiac arrest requiring VA-ECMO support and decanulated 5/19.  Pt remain in ICU vented, sedated and with EEG.  RNCC will cont to follow plan of care.      Plan  Anticipated Discharge Date:  TBD.  Anticipated Discharge Plan:   TBD.  RNCC will cont to follow plan of care.      Verito Ortiz RN, PHN, BSN  4A and 4E/ ICU  Care Coordinator  Phone: [phone]  Pager: [phone]

## 2020-05-22 NOTE — PROGRESS NOTES
WO Nurse Inpatient Pressure Injury Assessment   Reason for consultation: Evaluate and treat tongue      ASSESSMENT  Pressure Injury: on left top of tongue , hospital acquired ,   This is a Medical Device Related Pressure Injury (MDRPI) due to ETT  Pressure Injury is Stage Mucosal   Contributing factor of the pressure injury: pressure, microclimate and immobility  Status: initial assessment  Recommend provider order: NA     TREATMENT PLAN  Left top of tongue mucosal pressure injury: Good oral hygiene and routine repositioning of ETT (ensure tongue is disengaged with every reposition), attempt to not reposition ETT on wound.    Orders Written  WO Nurse follow-up plan:twice weekly  Nursing to notify the Provider(s) and re-consult the WOC Nurse if wound(s) deteriorates or new skin concern.    Patient History  According to provider note(s):  Scot Bishop is a 39 year old male who was admitted on 5/17/2020 for cardiac arrest. Pt reportedly had chest pain that started on 5/16/20. He was using Drano on his pipes at home and did not initially seek medical attention for CP and SOB since it said on the box that Drano can cause those symptoms. He took a shower and had syncopal episode after coming out of the shower. EMS was called; initial rhythm asystole. With chest compressions pt eventually had PEA and then eventually had VF in the field. After multiple cycles of epinephrine had ROSC. He was intubated in the field.   Pt was brought to [facility] ED where he regained a pulse and was brought to the Cath Lab for coronary angiogram. Initially, BP was 90s/60s as he was being transported from ED but he had recurrent cardiac arrest on arrival to Cath Lab. ECG showed ST elevation in aVR. He was promptly placed on VA ECMO. He had thrombotic occlusion of proximal LAD and underwent successful aspiration thrombectomy and PCI w/ NAZIA of proximal and mid LAD.     Objective Data  Containment of urine/stool: Indwelling catheter    Current  Diet/ Nutrition:  Orders Placed This Encounter      NPO for Medical/Clinical Reasons Except for: No Exceptions      Output:   I/O last 3 completed shifts:  In: 3726.54 [I.V.:1526.54; NG/GT:380]  Out: 2835 [Urine:2535; Stool:300]    Risk Assessment:   Sensory Perception: 1-->completely limited  Moisture: 3-->occasionally moist  Activity: 1-->bedfast  Mobility: 1-->completely immobile  Nutrition: 3-->adequate  Friction and Shear: 1-->problem  Burak Score: 10      Labs:   Recent Labs   Lab 05/22/20  0409 05/21/20  1626 05/21/20  0437  05/20/20  0356   ALBUMIN  --  2.7*  --   --  2.6*   HGB 7.5* 7.6* 7.5*   < > 7.1*   INR  --   --  1.19*  --  1.29*   WBC 15.9* 13.7* 12.9*   < > 16.9*   CRP  --   --   --   --  100.0*    < > = values in this interval not displayed.       Physical Exam  Skin inspection: focused tongue    Wound Location:  Left top of tongue    5/22    Date of last Photo 5/22/20  Wound History: Nurse had thought WOC was already following this wound, however WOC had only noted bite wound previously on underside of tongue  Measurements (length x width x depth, in cm) 0.8 cm x 0.4 cm  x  0 cm   Wound Base:  Tan dry adherent discoloration versus drainage   Tunneling N/A  Undermining N/A  Palpation of the wound bed: normal   Periwound skin: mucosa  Color: normal and consistent with surrounding tissue  Temperature: normal   Drainage:, none  Description of drainage: none  Odor: none  Pain: no grimacing or signs of discomfort,     Note right side of tongue bite briana nurse originally consulted WOC for      Purple discoloration follows shape of teeth.      Interventions  Current support surface: Standard  Low air loss mattress  Current off-loading measures: Pillows  Repositioning aid: Pillows  Visual inspection of wound(s) completed   Tube Securement: ETT stabilizer  Wound Care: was done per plan of care.  Supplies: none  Educated provided: importance of repositioning, plan of care and wound progress  Education  provided to: nurse  Discussed importance of:repositioning every 2 hours, off-loading pressure to wound and their role in pressure injury prevention  Discussed plan of care with Nurse    Uyen Andersen RN, CWOCN

## 2020-05-22 NOTE — PROGRESS NOTES
Preliminary Video EEG impression on May 21 2020  Until EEG was stopped for hygiene.       Full report to follow. Please look in inpatient chart, under procedure section.     Note: Patient had a hygiene break from EEG  on May 21.    Medications: Fentanyl 100 mcg/hr and propofol 30 mcg/kg/min    EEG consist of generalized delta theta slowing with superimposed generalized periodic discharges 0.25hz-2 hz in frequency, there is AP lag in BP montage, up to 70 uV, and noted in 50-75% of the record starting overnight.  There is not a well-formed parieto-occipital rhythm, or sleep architecture.No clear electrographic seizures or epileptiform discharges are seen.      EEG is consistent with a severe diffuse encephalopathy with superimposed generalized periodic generalized epileptiform discharges that are up to 2 Hz in frequency and occupy 50 to 75% of the record.  EEG will be restarted today.  Clinical correlation is advised.     Antonella Franklin MD  Staff Epilepsy Neurologist    [phone]

## 2020-05-22 NOTE — PROGRESS NOTES
[facility]    Medicine Progress Note - Pulmonary Critical Care       Date of Admission:  5/17/2020  Assessment & Plan   Scot Bishop is a 39 year old male admitted on 5/17/2020 with out of hospital cardiac arrest requiring VA-ECMO support decanulated 5/19 with transient high peak airway pressures 5/20.     1. High Peak inspiratory pressures  2. Acute hypoxic respiratory failure  3. Aspiration pneumonia  4. COVID negative  Patient demonstrates vent dyssynchrony.  Patient was given increased sedation and a push dose of paralytic.  On a PEEP of 12 peak pressures were 32 and plateau 24.  On these settings he appears to be on the linear portion of the pressure volume curve.  He was titrated up on precedex but became bradycardic.  We will liberate his sedation with Ketamine for deeper sedation to promote vent synchrony.  Depending on patient's clinical course he may benefit from transitioning to a pressure control vent mode.  If this is done his minute ventilation will need to be monitored.  He has had transient high peak pressures earlier in his hospitalization but this is thought to have been related to mucous plugging but bronchoscopy was negative.  BAL gram stain demonstrates Gram neg rods.  - Increase sedation with ketamine infusion  - Follow up cultures  - Agree with empiric ABx     5. Refractory VF arrest    6. S/p NAZIA to proximal-mid LAD  7. Sinus tachycardia   8. Shock liver  9. GIB  Management per primary     The patient's care was discussed with the Attending Physician, Dr. Castelan.     Chavez Diehl MD  [facility] [place]  Pager: [phone]  Please see sticky note for cross cover information__________________________    Interval History   No acute events overnight.  Patient underwent bronchoscopy revealing thin secretions without significant mucous plugs and a BAL was preformed.  This AM patient developed hypoxia with increase of his  FiO2.    Data reviewed today: I reviewed all medications, new labs and imaging results over the last 24 hours.    I personally reviewed the chest x-ray image(s) showing .    Physical Exam   Vital Signs: Temp: 100  F (37.8  C) Temp src: Esophageal     Heart Rate: 114 Resp: 23 SpO2: 99 % O2 Device: Mechanical Ventilator    Weight: 227 lbs 4.71 oz    GEN: intubated and sedated  CV:  RRR no murmurs or extra heart sounds appreciated  Pulm:  Adequate air movement, diffuse mild coarse breath sounds,  dyssynchronous with ventilator with significant belly breathing.  No wheezes, rales or rhonchi appreciated.  GI: non distended soft   : Silva in place        Data   Recent Labs   Lab 05/22/20  1156 05/22/20  0409 05/21/20 2008 05/21/20  1626 05/21/20  0437  05/20/20  0356  05/19/20  2205   WBC  --  15.9*  --  13.7* 12.9*   < > 16.9*  --  16.3*   HGB  --  7.5*  --  7.6* 7.5*   < > 7.1*  --  8.1*   MCV  --  94  --  93 93   < > 93  --  93   PLT  --  147*  --  129* 126*   < > 132*  --  139*   INR  --   --   --   --  1.19*  --  1.29*  --  1.24*   NA  --  150*  --  147* 146*   < > 144  --  143   POTASSIUM 3.4 3.8 3.6 3.4 4.1   < > 4.4  --  4.0   CHLORIDE  --  119*  --  114* 116*   < > 114*  --  113*   CO2  --  24  --  25 26   < > 23  --  24   BUN  --  36*  --  33* 39*   < > 36*  --  29   CR  --  1.12  --  1.22 1.32*   < > 1.93*  --  1.81*   ANIONGAP  --  7  --  8 4   < > 7  --  6   TEN  --  8.5  --  8.2* 8.1*   < > 7.6*  --  7.6*   GLC  --  147*  --  148* 134*   < > 130*  130*  --  141*  135*   ALBUMIN  --   --   --  2.7*  --   --  2.6*  --  2.9*   PROTTOTAL  --   --   --  6.2*  --   --  5.5*  --  5.7*   BILITOTAL  --   --   --  0.7  --   --  0.6  --  0.6   ALKPHOS  --   --   --  86  --   --  44  --  45   ALT  --   --   --  318*  --   --  461*  --  525*   AST  --   --   --  251*  --   --  382*  --  442*   TROPI  --  26.464*  --  36.687* 60.432*   < > 103.692*   < >  --     < > = values in this interval not displayed.     ABG  (11:56): pH 7.49 / 36 / 81

## 2020-05-22 NOTE — PLAN OF CARE
Major Shift Events:   Difficult to achieve goal sedation level. See MAR for current gtt's. Doesn't withdraw to pain, strong cough, PERRL. While coughing pt had episode of asystole/pause for greater than 6 seconds, A-line tracing flat at that time, pt returned to ST 120s without intervention. Brief VT/ectopy with coughing. Multiple vent changes today, latest PaO2 89. TF off, vomited tube feeds around ETT. OG to LIS, small gastric residual. 40mg lasix given x2. Lytes replaced. 1unit PRBCs given for hemoglobin of 6.4.   Plan: Adequately sedate and monitor respiratory status. Recheck hemoglobin/ABGs.   For vital signs and complete assessments, please see documentation flowsheets.

## 2020-05-22 NOTE — PROGRESS NOTES
[facility]  Palliative Care Daily Progress Note       Recommendations & Counseling       Patient seen and examined.  Reviewed with bedside nurse and unit RN care coordinator. Discussed with Neuro critical care staff as well    Family continues to be updated with patient's status. LICSW offering ongoing support to mother or patients young children (ages 6 and 10).  Patient's father has reported family doing okay- of course harder in the setting of COVID-19 restrictions. Offering ongoing assist with pictures in room and listening to concerns as they arise.     Pt remains sedated. Cognitive recovery yet to be determined.         Assessments          Scot Bishop is a 39 year old male admitted on 5/17/2020 via EMS to [facility] ED post cardiac arrest. Patient apparently reported chest pain 5/16 but did not seek medical attention for a day recalling he was working on his pipes at home and using Drano, with the warning on the box indicating it could cause shortness of breath or chest pain. The patient's significant other prevailed on him to go to the hospital, thereafter he experienced a syncopal episode and LOC.    EMS called and arrived with patient in asystole--> PEA--> VF in the field. Ongoing CPR and multiple cycles of epinephrine--> ROSC. Upon ED arrival he was promptly placed on ECM and found to have single-vessel CAD with thrombotic occlusion of proximal LAD--> successful aspiration thrombectomy of the proximal LAD.     Today, 5/22/20 the patient was seen for palliative care follow up for support related to recent OOH cardiac arrest.    Prognosis, Goals, or Advance Care Planning was addressed today with: No.  Mood, coping, and/or meaning in the context of serious illness were addressed today: No.  Summary/Comments: see consult note from 5/18 for details. Pt remains intubated and sedated.    Dustin OCAMPO NP  Nurse Practitioner- Lead Advanced Practice  Provider  [facility] Palliative Medicine Consult Service   [phone]  TT spent: 33 minutes of which 18 minutes were spent in direct face to face contact with patient/family. Greater than 50% of time spent coordinating care.          Interval History:     Chart review/discussion with unit or clinical team members:   Pt was decannulated and IABP support discontinued. Remains intubated. Minimally responsive. Discussed status with bedside RN and team.   Key Palliative Symptoms:  We are not helping to manage these symptoms currently in this patient.    Patient is on opioids: bowels not assessed today.            Review of Systems:     A system ROS was unable to be obtained secondary to critically ill and intubated status.          Medications:     I have reviewed this patient's medication profile and medications during this hospitalization.           Physical Exam:   Vitals were reviewed  Temp: 100  F (37.8  C) Temp src: Esophageal     Heart Rate: 114 Resp: 23 SpO2: 99 % O2 Device: Mechanical Ventilator   no appreciable change except as noted     General appearance: No acute distress.  Intubated and sedated.  ICU bed.  HEENT: Orally intubated.  Mucous membranes moist.  NC/AT. EEG surveillance ongoing.  Cardiac: S1-S2, RRR. Mildly tachycardic.  Lungs: Mechanical breath sounds on anterior exam.  Abdomen: Soft, nondistended, no masses palpable on brief exam.  Extremities: Trace bilateral lower extremity edema.  No cyanosis.  Peripheral pulses decreased gracie as previous.  Skin: Normal appearance without lesions on exposed surfaces.  Musculoskeletal: No swelling or redness of exposed joint surfaces.  Neurologic: Intubated and sedated.             Data Reviewed:     .  ROUTINE LABS (Last four results)  CMP  Recent Labs   Lab 05/22/20  1156 05/22/20  0409 05/21/20 2008 05/21/20  1626 05/21/20  0437  05/20/20  1559 05/20/20  0356 05/19/20  2205  05/19/20  1550   NA  --  150*  --  147* 146*  --  145* 144 143  --  144    POTASSIUM 3.4 3.8 3.6 3.4 4.1   < > 3.5 4.4 4.0  --  3.7   CHLORIDE  --  119*  --  114* 116*  --  112* 114* 113*  --  114*   CO2  --  24  --  25 26  --  23 23 24  --  25   ANIONGAP  --  7  --  8 4  --  10 7 6  --  5   GLC  --  147*  --  148* 134*  --  123* 130*  130* 141*  135*   < > 104*  105*   BUN  --  36*  --  33* 39*  --  38* 36* 29  --  24   CR  --  1.12  --  1.22 1.32*  --  1.63* 1.93* 1.81*  --  1.52*   GFRESTIMATED  --  81  --  73 67  --  52* 42* 45*  --  56*   GFRESTBLACK  --  >90  --  85 77  --  60* 49* 53*  --  65   TEN  --  8.5  --  8.2* 8.1*  --  8.0* 7.6* 7.6*  --  7.7*   MAG  --  2.4*  --   --  2.6*  --  2.1 2.2 2.3  --  2.3   PHOS  --  2.7  --   --  2.2*  --  3.4 4.6*  --   --   --    PROTTOTAL  --   --   --  6.2*  --   --   --  5.5* 5.7*  --  5.9*   ALBUMIN  --   --   --  2.7*  --   --   --  2.6* 2.9*  --  3.0*   BILITOTAL  --   --   --  0.7  --   --   --  0.6 0.6  --  0.5   ALKPHOS  --   --   --  86  --   --   --  44 45  --  44   AST  --   --   --  251*  --   --   --  382* 442*  --  537*   ALT  --   --   --  318*  --   --   --  461* 525*  --  592*    < > = values in this interval not displayed.     CBC  Recent Labs   Lab 05/22/20  0409 05/21/20  1626 05/21/20  0437 05/20/20  1559   WBC 15.9* 13.7* 12.9* 14.2*   RBC 2.46* 2.45* 2.44* 2.69*   HGB 7.5* 7.6* 7.5* 8.2*   HCT 23.2* 22.8* 22.7* 24.7*   MCV 94 93 93 92   MCH 30.5 31.0 30.7 30.5   MCHC 32.3 33.3 33.0 33.2   RDW 14.0 13.9 13.6 13.3   * 129* 126* 126*     INR  Recent Labs   Lab 05/21/20  0437 05/20/20  0356 05/19/20  2205 05/19/20  1550   INR 1.19* 1.29* 1.24* 1.25*     Arterial Blood Gas  Recent Labs   Lab 05/22/20  1156 05/22/20  1041 05/22/20  0743 05/22/20  0409   PH 7.49* 7.49* 7.48* 7.44   PCO2 36 36 33* 36   PO2 81 57* 56* 114*   HCO3 27 28 24 24   O2PER 70 70 100 50

## 2020-05-22 NOTE — PROGRESS NOTES
"[facility], [place]   Neurology Daily Note  Scot Bishop  [id number]  05/22/2020    Subjective: pulmonary status worsening over the course of the day.  Bronchoscopy performed yesterday.  Reportedly was intermittently withdrawing to noxious stimuli prior to recent increase in sedation.    Objective   Physical Examination   Vitals: Temp 100.6  F (38.1  C)   Resp 25   Ht 1.676 m (5' 6\")   Wt 103.1 kg (227 lb 4.7 oz)   SpO2 92%   BMI 36.69 kg/m    Gen: intubated, sedated  HEENT: intubated, no icterus  Lungs: mechanically ventilated  Ext: edema present  Neuro: Intubated, examined with fentanyl 200, versed 10, and precedex 0.7 sedation in place.  No eye opening in response to stimuli. Pupils are 2mm and equal and reactive to light, eyes are conjugate.  No abnormal movements noted.  No movements of extremities seen.  No response to noxious stimuli applied to extremities.    Investigations    Recent Labs   Lab 05/22/20  0409 05/21/20 2008 05/21/20  1626 05/21/20  0437  05/20/20  0356  05/19/20  2205   WBC 15.9*  --  13.7* 12.9*   < > 16.9*  --  16.3*   HGB 7.5*  --  7.6* 7.5*   < > 7.1*  --  8.1*   MCV 94  --  93 93   < > 93  --  93   *  --  129* 126*   < > 132*  --  139*   INR  --   --   --  1.19*  --  1.29*  --  1.24*   *  --  147* 146*   < > 144  --  143   POTASSIUM 3.8 3.6 3.4 4.1   < > 4.4  --  4.0   CHLORIDE 119*  --  114* 116*   < > 114*  --  113*   CO2 24  --  25 26   < > 23  --  24   BUN 36*  --  33* 39*   < > 36*  --  29   CR 1.12  --  1.22 1.32*   < > 1.93*  --  1.81*   ANIONGAP 7  --  8 4   < > 7  --  6   TEN 8.5  --  8.2* 8.1*   < > 7.6*  --  7.6*   *  --  148* 134*   < > 130*  130*  --  141*  135*   ALBUMIN  --   --  2.7*  --   --  2.6*  --  2.9*   PROTTOTAL  --   --  6.2*  --   --  5.5*  --  5.7*   BILITOTAL  --   --  0.7  --   --  0.6  --  0.6   ALKPHOS  --   --  86  --   --  44  --  45   ALT  --   --  318*  --   --  461*  --  525*   AST  --   --  " 251*  --   --  382*  --  442*   TROPI 26.464*  --  36.687* 60.432*   < > 103.692*   < >  --     < > = values in this interval not displayed.     Results for FAINA BSIHOP (MRN [id number])   Ref. Range 5/21/2020 04:37   Triglycerides Latest Ref Range: <150 mg/dL 609 (H)     CT head 5/17/2020:  Impression: No acute intracranial pathology.    Assessment and Plan   Faina Bishop is a 39 year old male with past medical history including back pain who presented 5/17/2020 with asystole cardiac arrest.  Initial CT head without evidence of anoxic injury. EEG consistent with encephalopathy.  Neurologic exam limited by current need for sedation.  Continues to be difficult to predict eventual post-cardiac arrest neurological outcome.    Recommendations  - Will restart video EEG given need for additional sedation and paralysis due to pulmonary illness  - avoid hypotonic fluids  - wean sedation as able  - neurocritical care service will continue to follow    Patient was seen and discussed with Dr. Fidelia Herrera MD  Neurology PGY-2  Ascom [id number]

## 2020-05-22 NOTE — PROGRESS NOTES
Cardiology Progress Note  Scot Bishop MRN: [id number]  Age: 39 year old, : 1980  Date: 2020            Assessment and Plan:     Scto Bishop is a 39 year old male who was admitted on 2020 for cardiac arrest. Pt reportedly had chest pain that started on 20. He was using Drano on his pipes at home and did not initially seek medical attention for CP and SOB since it said on the box that Drano can cause those symptoms. He took a shower and had syncopal episode after coming out of the shower. EMS was called; initial rhythm asystole. With chest compressions pt eventually had PEA and then eventually had VF in the field. After multiple cycles of epinephrine had ROSC. He was intubated in the field.   Pt was brought to [facility] ED where he regained a pulse and was brought to the Cath Lab for coronary angiogram. Initially, BP was 90s/60s as he was being transported from ED but he had recurrent cardiac arrest on arrival to Cath Lab. ECG showed ST elevation in aVR. He was promptly placed on VA ECMO. He had thrombotic occlusion of proximal LAD and underwent successful aspiration thrombectomy and PCI w/ NAZIA of proximal and mid LAD.     Interval events: Febrile to 100.9F and increasing oxygen requirements overnight. WBC 15.9 and LA 3.8. FiO2 % this AM w/ SpO2 low 90s, PaO2 50s, and resp rate high 20s with vent dyssynchrony. Stat CXR, ABG, blood cx, UA. Meropenem added for GNR in sputum. Lasix 40 mg IV given. Pulmonary re-consulted. Pt sedated with fentanyl/versed/precedex with plan for paralytics if needed to promote pt-vent synchrony and improve oxygenation. Per pulmonary, no indication for bronch at present.       Today's plan:   -discontinue propofol given minimal effectiveness and increased TGs   -add versed   -continue precedex and fentanyl   -pulmonary consult   -lasix 40 mg IV this AM   -monitor I/O  -hgb 7.5 this AM; continue to monitor - will transfuse if  <7    Neurology: Intubated, sedated. Initial CT head negative. Cooled to 34 degrees on arrival; rewarmed 5/19 AM. EEG this AM showed severe diffuse encephalopathy w/ generalized periodic epileptiform discharges.   -continue fentanyl and precedex   -add versed  -stop propofol  -will consider paralyzing w/ vec if unable to achieve vent synchrony w/ sedation alone   -RASS goal -4 to -5   Cardiovascular / Hemodynamics: Refractory VF arrest    S/p NAZIA to proximal-mid LAD  Sinus tachycardia   Peripheral VA ECMO inserted for cardiac arrest. LA 16 initially. Suspect ongoing tachycardia d/t evolving MI and post-arrest state. ECMO decannulation at bedside on 5/19; IABP discontinued 5/20.  TTE 5/20/20: EF 45-50%, RV normal   EKG: Sinus tachycardia   -continue ASA 81mg daily and ticagrelor 90mg BID   -hold statin for now given likely hepatic injury during arrest  -hold ACE/ARB for now given likely reduced renal fxn after arrest  -holding beta blocker given shock    Pulmonary: Acute hypoxic respiratory failure  COVID negative  Admission CT chest showed bilateral consolidations c/w aspiration PNA. Had thick secretions that required bagging by RT on 5/20. Increasing oxygen requirements and pt-vent dyssynchrony overnight. Pulmonary re-consulted, infectious work up, pt sedated.   Vent settings this AM: 15/500/10/80% w/ PaO2 50s.   CXR: b/l opacities. ETT 4 cm above the chaim. Lines in stable position. No acute changes on repeat CXR this AM.   -pt sedated on fentanyl, versed, precedex  -vent settings now 15/500/12/70%; ABG 7.49/36/81/27 on these settings   -per pulm, no indication for repeat bronch   -s/p bronchoscopy yesterday - f/u BAL results   -continue mucomyst and albuterol nebs  -growing GNR in sputum this AM; continue vanc and add meropenem today until speciated     -wean vent as able  -daily CXR  -Q12h ABGs and PRN   -consider scheduled duonebs if signs of lung dz, currently PRN       GI and Nutrition: Shock liver  GIB,  "resolved   -monitor BID LFTs  -continue TF   -bowel regimen   -GI Prophylaxis: Protonix BID for UGIB   Renal, Fluid and Electrolytes: Acute kidney injury   Cr improved to 1.12 this AM. 3.2L UOP and net negative 660 mL yesterday after lasix. Na 150 today.   -increase FWF to 60 mL q 4 hrs   -lasix 40 mg IV this AM; possibly repeat dose to keep net neg 500mL to 1L  -monitor urine output  -maintain K>4 and Mg>2    Infectious Disease: Aspiration pneumonia  Leukocytosis  Blood cultures collected - negative thus far. Grew Staph aureus in sputum cx.   -vancomycin/zosyn x5 days (5/17-5/22) for asp PNA  -extend vancomycin for S aureus in sputum    -add meropenem for GNR in sputum    -monitor for signs of infection given lines and leukocytosis   Hematology and Oncology: Receiving ASA/ticagrelor for NAZIA. Transfused 1 unit PRBCs yesterday. Hgb 7.5 this AM. No signs of active bleeding.   -transfuse for Hgb<7   -DVT PPX: subcutaneous heparin    Endocrinology: No known medical history. BG elevated.  -not requiring insulin gtt  -f/u HgbA1c    Lines:       R radial arterial line May 17, 2020  ETT May 17, 2020  Silva catheter May 17, 2020  OG tube May 17, 2020  Restraint: not currently needed    Current lines are required for patient management       Family update by me today: Yes     Code Status: Full code     The pt was discussed and evaluated with Dr. Alanis, attending physician, who agrees with the assessment and plan above.     Kala Chiang, APRN CNP          Objective     Temp 100.4  F (38  C)   Resp 21   Ht 1.676 m (5' 6\")   Wt 103.1 kg (227 lb 4.7 oz)   SpO2 99%   BMI 36.69 kg/m    Temp:  [98.4  F (36.9  C)-101.5  F (38.6  C)] 100.4  F (38  C)  Heart Rate:  [] 111  Resp:  [12-47] 21  MAP:  [64 mmHg-129 mmHg] 75 mmHg  Arterial Line BP: ()/(26-80) 90/68  FiO2 (%):  [45 %-100 %] 70 %  SpO2:  [87 %-100 %] 99 %  Wt Readings from Last 2 Encounters:   05/22/20 103.1 kg (227 lb 4.7 oz)     I/O last 3 " completed shifts:  In: 3322.62 [I.V.:1232.62; NG/GT:270]  Out: 3638 [Urine:3338; Stool:300]      GENERAL APPEARANCE: Intubated, sedated. NAD.  HEENT: No icterus, PERRL 2 mm, ETT in place, OG tube in place  CARDIOVASCULAR: regular rhythm, normal S1 and S2, no S3 or S4 and no murmur, click or rub. Normal PMI. Pulses dopplerable.  RESP: Coarse bilaterally. Mechanical ventilation.    GASTRO: Soft, bowel sounds hypoactive but present  GENITOURINARY: Silva in place.  EXTREMITIES: Warm, +1 edema, pulses dopplered.  NEURO: Sedated and intubated, Pupils equal and reactive, 2 mm. Fentanyl, versed, precedex for sedation.  INTEGUMENTARY: No rashes. Line sites CDI  LINES/TUBES/DRAINS: R radial Arterial line. ETT. OG. Silva Catheter.          Data:     Vent Settings:  Resp: 21 SpO2: 99 % O2 Device: Mechanical Ventilator      Arterial Blood Gas:   Recent Labs   Lab 05/22/20  1156 05/22/20  1041 05/22/20  0743 05/22/20  0409   PH 7.49* 7.49* 7.48* 7.44   PCO2 36 36 33* 36   PO2 81 57* 56* 114*   HCO3 27 28 24 24   O2PER 70 70 100 50       Vitals:    05/20/20 0000 05/21/20 0600 05/22/20 0000   Weight: 105 kg (231 lb 7.7 oz) 102.9 kg (226 lb 13.7 oz) 103.1 kg (227 lb 4.7 oz)   I/O last 3 completed shifts:  In: 3322.62 [I.V.:1232.62; NG/GT:270]  Out: 3638 [Urine:3338; Stool:300]  Recent Labs   Lab 05/22/20  1156 05/22/20  0409  05/21/20  1626   NA  --  150*  --  147*   POTASSIUM 3.4 3.8   < > 3.4   CHLORIDE  --  119*  --  114*   CO2  --  24  --  25   ANIONGAP  --  7  --  8   GLC  --  147*  --  148*   BUN  --  36*  --  33*   CR  --  1.12  --  1.22   TEN  --  8.5  --  8.2*    < > = values in this interval not displayed.     No components found for: URINE   Recent Labs   Lab 05/21/20  1626 05/20/20  0356 05/19/20  2205   * 382* 442*   * 461* 525*   BILITOTAL 0.7 0.6 0.6   ALBUMIN 2.7* 2.6* 2.9*   PROTTOTAL 6.2* 5.5* 5.7*   ALKPHOS 86 44 45     Temp: 100.4  F (38  C) Temp src: EsophagealTemp  Min: 98.4  F (36.9  C)  Max:  101.5  F (38.6  C)   Recent Labs   Lab 20  0409 20  1626 20  0437 20  1559 20  0356   WBC 15.9* 13.7* 12.9* 14.2* 16.9*   HGB 7.5* 7.6* 7.5* 8.2* 7.1*   HCT 23.2* 22.8* 22.7* 24.7* 22.4*   MCV 94 93 93 92 93   RDW 14.0 13.9 13.6 13.3 13.4   * 129* 126* 126* 132*     Recent Labs   Lab 20  0437 20  0356 20  2205 20  1550 20  0952 20  0415   INR 1.19* 1.29* 1.24* 1.25* 1.29* 1.40*   PTT  --  30 36 69* 69* 71*     Recent Labs   Lab 20  0409 20  1626 20  0437 20  1559 20  0356   * 148* 134* 123* 130*  130*       All imaging personally reviewed:  Recent Results (from the past 24 hour(s))   Echocardiogram Complete    Narrative    [id number]  [id number]  [id number]  [id number]^GRIS^YOSHI           [facility],[place]  Echocardiography Laboratory  [address] [place] [id number]     Name: FAINA FORRESTER  MRN: [id number]  : 1980  Study Date: 2020 08:16 AM  Age: 39 yrs  Gender: Male  Patient Location: [facility]  Reason For Study: Cardiac Arrest  Ordering Physician: YOSHI LANDRY  Performed By: Denisse Johnson RDCS     BSA: 2.1 m2  Height: 66 in  Weight: 231 lb  BP: 132/58 mmHg  _____________________________________________________________________________  __        Procedure  Complete Portable Echo Adult. Technically difficult study.Extremely poor  acoustic windows.  _____________________________________________________________________________  __        Interpretation Summary  VA ECMO at 3.7L/min. Technically difficult study. Extremely poor acoustic  windows.  Severely (EF <30%) reduced left ventricular function on extremely limited  views.  The right ventricle cannot be assessed.  No pericardial effusion is present.  Previous study not available for comparison.  _____________________________________________________________________________  __        Left Ventricle  Left ventricular  wall thickness cannot evaluate. Left ventricular size is  normal. Severely (EF <30%) reduced left ventricular function is present. Left  ventricular diastolic function is not assessable. Severe diffuse hypokinesis  is present.     Right Ventricle  The right ventricle cannot be assessed. Right ventricular function cannot be  assessed due to poor image quality.     Mitral Valve  The mitral valve cannot be assessed.        Aortic Valve  The aortic valve opens with each cardiac cycle. On Doppler interrogation,  there is no significant stenosis or regurgitation.     Tricuspid Valve  The tricuspid valve cannot be assessed. Pulmonary artery systolic pressure  cannot be assessed.     Pulmonic Valve  The pulmonic valve cannot be assessed.     Vessels  The aorta root cannot be assessed. The thoracic aorta cannot be assessed.  Venous ECMO cannula is visualized traversing the IVC.     Pericardium  No pericardial effusion is present.     Compared to Previous Study  Previous study not available for comparison.     _____________________________________________________________________________  __                       Report approved by: Ashlee Izquierdo 05/18/2020 09:18 AM                 _____________________________________________________________________________  __                Medications     Current Facility-Administered Medications   Medication     0.9% sodium chloride BOLUS     acetaminophen (TYLENOL) tablet 650 mg    Or     acetaminophen (TYLENOL) Suppository 650 mg     acetylcysteine (MUCOMYST) 10 % nebulizer solution 4 mL     albuterol (PROVENTIL) neb solution 2.5 mg     artificial tears ophthalmic ointment     aspirin (ASA) chewable tablet 81 mg     calcium chloride in  mL intermittent infusion 1 g     calcium chloride injection 1 g     dexmedetomidine (PRECEDEX) 400 mcg in 0.9% sodium chloride 100 mL     dextrose 10% infusion     glucose gel 15-30 g    Or     dextrose 50 % injection 25-50 mL    Or      glucagon injection 1 mg     fentaNYL (SUBLIMAZE) bolus from infusion pump-ADULT 50 mcg     fentaNYL (SUBLIMAZE) infusion     heparin ANTICOAGULANT injection 5,000 Units     heparin lock flush 10 UNIT/ML injection 2-5 mL     heparin lock flush 10 UNIT/ML injection 5-10 mL     heparin lock flush 10 UNIT/ML injection 5-10 mL     ipratropium - albuterol 0.5 mg/2.5 mg/3 mL (DUONEB) neb solution 3 mL     lidocaine (LMX4) cream     lidocaine (LMX4) cream     lidocaine (LMX4) cream     lidocaine 1 % 0.1-1 mL     lidocaine 1 % 0.1-1 mL     lidocaine 1 % 0.1-1 mL     lidocaine 1% with EPINEPHrine 1:100,000 30 mL, bupivacaine (MARCAINE) 30 mL     magnesium sulfate 2 g in water intermittent infusion     magnesium sulfate 4 g in 100 mL sterile water (premade)     meropenem (MERREM) 1 g vial to attach to  mL bag     midazolam (VERSED) drip - ADULT 100 mg/100 mL in NS PRE-MIX     midazolam (VERSED) injection 2 mg     multivitamins w/minerals (CERTAVITE) liquid 15 mL     naloxone (NARCAN) injection 0.1-0.4 mg     pantoprazole (PROTONIX) 2 mg/mL suspension 40 mg     potassium chloride (KLOR-CON) Packet 20-40 mEq     potassium chloride 10 mEq in 100 mL intermittent infusion with 10 mg lidocaine     potassium chloride 10 mEq in 100 mL sterile water intermittent infusion (premix)     potassium chloride 20 mEq in 50 mL intermittent infusion     potassium chloride ER (KLOR-CON M) CR tablet 20-40 mEq     senna-docusate (SENOKOT-S/PERICOLACE) 8.6-50 MG per tablet 1 tablet     sodium chloride (PF) 0.9% PF flush 10-20 mL     sodium chloride (PF) 0.9% PF flush 3 mL     sodium chloride (PF) 0.9% PF flush 3 mL     sodium chloride (PF) 0.9% PF flush 5-50 mL     sodium phosphate 10 mmol in D5W intermittent infusion     sodium phosphate 15 mmol in D5W intermittent infusion     sodium phosphate 20 mmol in D5W intermittent infusion     sodium phosphate 25 mmol in D5W intermittent infusion     ticagrelor (BRILINTA) tablet 90 mg     vancomycin  (VANCOCIN) 1,750 mg in sodium chloride 0.9 % 250 mL intermittent infusion     vecuronium (NORCURON) 50 mg in D5W 50 mL     vecuronium (NORCURON) injection 10 mg                      I have seen and examined the patient with the [facility] team. I agree with the assessment and plan of the note above.I have reviewed pertinent labs.     Jefferson Alanis MD  Interventional Cardiology  Pager: [phone]

## 2020-05-23 ENCOUNTER — APPOINTMENT (OUTPATIENT)
Dept: NEUROLOGY | Facility: CLINIC | Age: 40
End: 2020-05-23
Attending: INTERNAL MEDICINE
Payer: MEDICAID

## 2020-05-23 ENCOUNTER — APPOINTMENT (OUTPATIENT)
Dept: GENERAL RADIOLOGY | Facility: CLINIC | Age: 40
End: 2020-05-23
Attending: INTERNAL MEDICINE
Payer: MEDICAID

## 2020-05-23 LAB
ABO + RH BLD: NORMAL
ABO + RH BLD: NORMAL
ALBUMIN SERPL-MCNC: 3 G/DL (ref 3.4–5)
ALBUMIN SERPL-MCNC: 3 G/DL (ref 3.4–5)
ALP SERPL-CCNC: 84 U/L (ref 40–150)
ALP SERPL-CCNC: 96 U/L (ref 40–150)
ALT SERPL W P-5'-P-CCNC: 268 U/L (ref 0–70)
ALT SERPL W P-5'-P-CCNC: 293 U/L (ref 0–70)
ANION GAP SERPL CALCULATED.3IONS-SCNC: 6 MMOL/L (ref 3–14)
ANION GAP SERPL CALCULATED.3IONS-SCNC: 6 MMOL/L (ref 3–14)
AST SERPL W P-5'-P-CCNC: 191 U/L (ref 0–45)
AST SERPL W P-5'-P-CCNC: 198 U/L (ref 0–45)
BACTERIA SPEC CULT: ABNORMAL
BACTERIA SPEC CULT: NO GROWTH
BACTERIA SPEC CULT: NO GROWTH
BASE DEFICIT BLDA-SCNC: 2 MMOL/L
BASE DEFICIT BLDA-SCNC: 2.2 MMOL/L
BASE DEFICIT BLDA-SCNC: 3.5 MMOL/L
BILIRUB SERPL-MCNC: 1.1 MG/DL (ref 0.2–1.3)
BILIRUB SERPL-MCNC: 1.5 MG/DL (ref 0.2–1.3)
BLD GP AB SCN SERPL QL: NORMAL
BLOOD BANK CMNT PATIENT-IMP: NORMAL
BUN SERPL-MCNC: 35 MG/DL (ref 7–30)
BUN SERPL-MCNC: 36 MG/DL (ref 7–30)
CA-I BLD-MCNC: 4.9 MG/DL (ref 4.4–5.2)
CALCIUM SERPL-MCNC: 8.3 MG/DL (ref 8.5–10.1)
CALCIUM SERPL-MCNC: 8.6 MG/DL (ref 8.5–10.1)
CHLORIDE SERPL-SCNC: 120 MMOL/L (ref 94–109)
CHLORIDE SERPL-SCNC: 122 MMOL/L (ref 94–109)
CO2 SERPL-SCNC: 23 MMOL/L (ref 20–32)
CO2 SERPL-SCNC: 24 MMOL/L (ref 20–32)
CREAT SERPL-MCNC: 1.19 MG/DL (ref 0.66–1.25)
CREAT SERPL-MCNC: 1.32 MG/DL (ref 0.66–1.25)
ERYTHROCYTE [DISTWIDTH] IN BLOOD BY AUTOMATED COUNT: 14.4 % (ref 10–15)
ERYTHROCYTE [DISTWIDTH] IN BLOOD BY AUTOMATED COUNT: 14.9 % (ref 10–15)
GFR SERPL CREATININE-BSD FRML MDRD: 67 ML/MIN/{1.73_M2}
GFR SERPL CREATININE-BSD FRML MDRD: 75 ML/MIN/{1.73_M2}
GLUCOSE BLDC GLUCOMTR-MCNC: 106 MG/DL (ref 70–99)
GLUCOSE BLDC GLUCOMTR-MCNC: 89 MG/DL (ref 70–99)
GLUCOSE BLDC GLUCOMTR-MCNC: 91 MG/DL (ref 70–99)
GLUCOSE BLDC GLUCOMTR-MCNC: 93 MG/DL (ref 70–99)
GLUCOSE BLDC GLUCOMTR-MCNC: 95 MG/DL (ref 70–99)
GLUCOSE BLDC GLUCOMTR-MCNC: 96 MG/DL (ref 70–99)
GLUCOSE SERPL-MCNC: 104 MG/DL (ref 70–99)
GLUCOSE SERPL-MCNC: 98 MG/DL (ref 70–99)
HCO3 BLD-SCNC: 21 MMOL/L (ref 21–28)
HCO3 BLD-SCNC: 22 MMOL/L (ref 21–28)
HCO3 BLD-SCNC: 23 MMOL/L (ref 21–28)
HCT VFR BLD AUTO: 23.3 % (ref 40–53)
HCT VFR BLD AUTO: 24.1 % (ref 40–53)
HGB BLD-MCNC: 7.4 G/DL (ref 13.3–17.7)
HGB BLD-MCNC: 7.5 G/DL (ref 13.3–17.7)
LACTATE BLD-SCNC: 0.9 MMOL/L (ref 0.7–2)
LACTATE BLD-SCNC: 1 MMOL/L (ref 0.7–2)
Lab: ABNORMAL
MAGNESIUM SERPL-MCNC: 2.4 MG/DL (ref 1.6–2.3)
MAGNESIUM SERPL-MCNC: 2.4 MG/DL (ref 1.6–2.3)
MCH RBC QN AUTO: 30.6 PG (ref 26.5–33)
MCH RBC QN AUTO: 30.6 PG (ref 26.5–33)
MCHC RBC AUTO-ENTMCNC: 31.1 G/DL (ref 31.5–36.5)
MCHC RBC AUTO-ENTMCNC: 31.8 G/DL (ref 31.5–36.5)
MCV RBC AUTO: 96 FL (ref 78–100)
MCV RBC AUTO: 98 FL (ref 78–100)
O2/TOTAL GAS SETTING VFR VENT: 50 %
O2/TOTAL GAS SETTING VFR VENT: 50 %
O2/TOTAL GAS SETTING VFR VENT: 60 %
OXYHGB MFR BLD: 96 % (ref 92–100)
OXYHGB MFR BLD: 98 % (ref 92–100)
PCO2 BLD: 33 MM HG (ref 35–45)
PCO2 BLD: 34 MM HG (ref 35–45)
PCO2 BLD: 37 MM HG (ref 35–45)
PH BLD: 7.4 PH (ref 7.35–7.45)
PH BLD: 7.41 PH (ref 7.35–7.45)
PH BLD: 7.41 PH (ref 7.35–7.45)
PHOSPHATE SERPL-MCNC: 3.2 MG/DL (ref 2.5–4.5)
PHOSPHATE SERPL-MCNC: 3.5 MG/DL (ref 2.5–4.5)
PLATELET # BLD AUTO: 134 10E9/L (ref 150–450)
PLATELET # BLD AUTO: 171 10E9/L (ref 150–450)
PO2 BLD: 124 MM HG (ref 80–105)
PO2 BLD: 178 MM HG (ref 80–105)
PO2 BLD: 85 MM HG (ref 80–105)
POTASSIUM SERPL-SCNC: 3.7 MMOL/L (ref 3.4–5.3)
POTASSIUM SERPL-SCNC: 3.7 MMOL/L (ref 3.4–5.3)
PROT SERPL-MCNC: 6.6 G/DL (ref 6.8–8.8)
PROT SERPL-MCNC: 7.2 G/DL (ref 6.8–8.8)
RBC # BLD AUTO: 2.42 10E12/L (ref 4.4–5.9)
RBC # BLD AUTO: 2.45 10E12/L (ref 4.4–5.9)
SODIUM SERPL-SCNC: 150 MMOL/L (ref 133–144)
SODIUM SERPL-SCNC: 151 MMOL/L (ref 133–144)
SPECIMEN EXP DATE BLD: NORMAL
SPECIMEN SOURCE: ABNORMAL
SPECIMEN SOURCE: ABNORMAL
SPECIMEN SOURCE: NORMAL
SPECIMEN SOURCE: NORMAL
TROPONIN I SERPL-MCNC: 13.27 UG/L (ref 0–0.04)
TROPONIN I SERPL-MCNC: 8.14 UG/L (ref 0–0.04)
WBC # BLD AUTO: 12.4 10E9/L (ref 4–11)
WBC # BLD AUTO: 13.3 10E9/L (ref 4–11)

## 2020-05-23 PROCEDURE — 83605 ASSAY OF LACTIC ACID: CPT | Performed by: NURSE PRACTITIONER

## 2020-05-23 PROCEDURE — 93010 ELECTROCARDIOGRAM REPORT: CPT | Mod: 59 | Performed by: INTERNAL MEDICINE

## 2020-05-23 PROCEDURE — 00000146 ZZHCL STATISTIC GLUCOSE BY METER IP

## 2020-05-23 PROCEDURE — 71045 X-RAY EXAM CHEST 1 VIEW: CPT

## 2020-05-23 PROCEDURE — 84484 ASSAY OF TROPONIN QUANT: CPT | Performed by: NURSE PRACTITIONER

## 2020-05-23 PROCEDURE — 25000132 ZZH RX MED GY IP 250 OP 250 PS 637: Performed by: STUDENT IN AN ORGANIZED HEALTH CARE EDUCATION/TRAINING PROGRAM

## 2020-05-23 PROCEDURE — 25000125 ZZHC RX 250: Performed by: INTERNAL MEDICINE

## 2020-05-23 PROCEDURE — 86901 BLOOD TYPING SEROLOGIC RH(D): CPT | Performed by: INTERNAL MEDICINE

## 2020-05-23 PROCEDURE — 25800030 ZZH RX IP 258 OP 636: Performed by: NURSE PRACTITIONER

## 2020-05-23 PROCEDURE — 25000128 H RX IP 250 OP 636: Performed by: STUDENT IN AN ORGANIZED HEALTH CARE EDUCATION/TRAINING PROGRAM

## 2020-05-23 PROCEDURE — 82805 BLOOD GASES W/O2 SATURATION: CPT

## 2020-05-23 PROCEDURE — 94640 AIRWAY INHALATION TREATMENT: CPT

## 2020-05-23 PROCEDURE — 25800030 ZZH RX IP 258 OP 636: Performed by: INTERNAL MEDICINE

## 2020-05-23 PROCEDURE — 94003 VENT MGMT INPAT SUBQ DAY: CPT

## 2020-05-23 PROCEDURE — 80053 COMPREHEN METABOLIC PANEL: CPT | Performed by: NURSE PRACTITIONER

## 2020-05-23 PROCEDURE — 82803 BLOOD GASES ANY COMBINATION: CPT | Performed by: NURSE PRACTITIONER

## 2020-05-23 PROCEDURE — 99207 ZZC APP CREDIT; MD BILLING SHARED VISIT: CPT | Performed by: NURSE PRACTITIONER

## 2020-05-23 PROCEDURE — 83735 ASSAY OF MAGNESIUM: CPT | Performed by: NURSE PRACTITIONER

## 2020-05-23 PROCEDURE — 82330 ASSAY OF CALCIUM: CPT | Performed by: NURSE PRACTITIONER

## 2020-05-23 PROCEDURE — 25000132 ZZH RX MED GY IP 250 OP 250 PS 637

## 2020-05-23 PROCEDURE — 40000275 ZZH STATISTIC RCP TIME EA 10 MIN

## 2020-05-23 PROCEDURE — 25000128 H RX IP 250 OP 636: Performed by: NURSE PRACTITIONER

## 2020-05-23 PROCEDURE — 25000128 H RX IP 250 OP 636

## 2020-05-23 PROCEDURE — 25000132 ZZH RX MED GY IP 250 OP 250 PS 637: Performed by: NURSE PRACTITIONER

## 2020-05-23 PROCEDURE — 25000132 ZZH RX MED GY IP 250 OP 250 PS 637: Performed by: INTERNAL MEDICINE

## 2020-05-23 PROCEDURE — 86850 RBC ANTIBODY SCREEN: CPT | Performed by: INTERNAL MEDICINE

## 2020-05-23 PROCEDURE — 84100 ASSAY OF PHOSPHORUS: CPT | Performed by: NURSE PRACTITIONER

## 2020-05-23 PROCEDURE — 27210437 ZZH NUTRITION PRODUCT SEMIELEM INTERMED LITER

## 2020-05-23 PROCEDURE — 94640 AIRWAY INHALATION TREATMENT: CPT | Mod: 76

## 2020-05-23 PROCEDURE — 93005 ELECTROCARDIOGRAM TRACING: CPT

## 2020-05-23 PROCEDURE — 25000125 ZZHC RX 250: Performed by: NURSE PRACTITIONER

## 2020-05-23 PROCEDURE — 82805 BLOOD GASES W/O2 SATURATION: CPT | Performed by: NURSE PRACTITIONER

## 2020-05-23 PROCEDURE — 85027 COMPLETE CBC AUTOMATED: CPT | Performed by: NURSE PRACTITIONER

## 2020-05-23 PROCEDURE — 99291 CRITICAL CARE FIRST HOUR: CPT | Performed by: INTERNAL MEDICINE

## 2020-05-23 PROCEDURE — 20000004 ZZH R&B ICU UMMC

## 2020-05-23 PROCEDURE — 95715 VEEG EA 12-26HR INTMT MNTR: CPT

## 2020-05-23 PROCEDURE — 86900 BLOOD TYPING SEROLOGIC ABO: CPT | Performed by: INTERNAL MEDICINE

## 2020-05-23 RX ORDER — FUROSEMIDE 10 MG/ML
40 INJECTION INTRAMUSCULAR; INTRAVENOUS ONCE
Status: COMPLETED | OUTPATIENT
Start: 2020-05-23 | End: 2020-05-23

## 2020-05-23 RX ORDER — LIDOCAINE HYDROCHLORIDE 20 MG/ML
5 SOLUTION OROPHARYNGEAL ONCE
Status: DISCONTINUED | OUTPATIENT
Start: 2020-05-23 | End: 2020-05-24

## 2020-05-23 RX ADMIN — POTASSIUM CHLORIDE 20 MEQ: 29.8 INJECTION, SOLUTION INTRAVENOUS at 05:11

## 2020-05-23 RX ADMIN — FENTANYL CITRATE 125 MCG/HR: 50 INJECTION INTRAVENOUS at 11:45

## 2020-05-23 RX ADMIN — ALBUTEROL SULFATE 2.5 MG: 2.5 SOLUTION RESPIRATORY (INHALATION) at 04:59

## 2020-05-23 RX ADMIN — ALBUTEROL SULFATE 2.5 MG: 2.5 SOLUTION RESPIRATORY (INHALATION) at 00:36

## 2020-05-23 RX ADMIN — ASPIRIN 81 MG CHEWABLE TABLET 81 MG: 81 TABLET CHEWABLE at 07:38

## 2020-05-23 RX ADMIN — TICAGRELOR 90 MG: 90 TABLET ORAL at 19:43

## 2020-05-23 RX ADMIN — Medication 40 MG: at 19:42

## 2020-05-23 RX ADMIN — MIDAZOLAM (PF) 1 MG/ML IN 0.9 % SODIUM CHLORIDE INTRAVENOUS SOLUTION 2 MG/HR: at 14:01

## 2020-05-23 RX ADMIN — VANCOMYCIN HYDROCHLORIDE 1750 MG: 10 INJECTION, POWDER, LYOPHILIZED, FOR SOLUTION INTRAVENOUS at 21:46

## 2020-05-23 RX ADMIN — ALBUTEROL SULFATE 2.5 MG: 2.5 SOLUTION RESPIRATORY (INHALATION) at 12:06

## 2020-05-23 RX ADMIN — TICAGRELOR 90 MG: 90 TABLET ORAL at 07:39

## 2020-05-23 RX ADMIN — FUROSEMIDE 40 MG: 10 INJECTION, SOLUTION INTRAMUSCULAR; INTRAVENOUS at 12:39

## 2020-05-23 RX ADMIN — MEROPENEM 1 G: 1 INJECTION, POWDER, FOR SOLUTION INTRAVENOUS at 07:38

## 2020-05-23 RX ADMIN — ACETYLCYSTEINE 4 ML: 100 SOLUTION ORAL; RESPIRATORY (INHALATION) at 12:06

## 2020-05-23 RX ADMIN — MULTIVITAMIN 15 ML: LIQUID ORAL at 07:39

## 2020-05-23 RX ADMIN — HEPARIN SODIUM 5000 UNITS: 5000 INJECTION, SOLUTION INTRAVENOUS; SUBCUTANEOUS at 05:12

## 2020-05-23 RX ADMIN — ACETYLCYSTEINE 4 ML: 100 SOLUTION ORAL; RESPIRATORY (INHALATION) at 00:36

## 2020-05-23 RX ADMIN — ACETYLCYSTEINE 4 ML: 100 SOLUTION ORAL; RESPIRATORY (INHALATION) at 08:40

## 2020-05-23 RX ADMIN — Medication 40 MG: at 07:44

## 2020-05-23 RX ADMIN — ACETYLCYSTEINE 4 ML: 100 SOLUTION ORAL; RESPIRATORY (INHALATION) at 05:00

## 2020-05-23 RX ADMIN — HEPARIN SODIUM 5000 UNITS: 5000 INJECTION, SOLUTION INTRAVENOUS; SUBCUTANEOUS at 21:46

## 2020-05-23 RX ADMIN — ACETYLCYSTEINE 4 ML: 100 SOLUTION ORAL; RESPIRATORY (INHALATION) at 16:23

## 2020-05-23 RX ADMIN — ALBUTEROL SULFATE 2.5 MG: 2.5 SOLUTION RESPIRATORY (INHALATION) at 16:23

## 2020-05-23 RX ADMIN — MEROPENEM 1 G: 1 INJECTION, POWDER, FOR SOLUTION INTRAVENOUS at 15:37

## 2020-05-23 RX ADMIN — ALBUTEROL SULFATE 2.5 MG: 2.5 SOLUTION RESPIRATORY (INHALATION) at 20:18

## 2020-05-23 RX ADMIN — ALBUTEROL SULFATE 2.5 MG: 2.5 SOLUTION RESPIRATORY (INHALATION) at 08:40

## 2020-05-23 RX ADMIN — ACETYLCYSTEINE 4 ML: 100 SOLUTION ORAL; RESPIRATORY (INHALATION) at 20:18

## 2020-05-23 RX ADMIN — MEROPENEM 1 G: 1 INJECTION, POWDER, FOR SOLUTION INTRAVENOUS at 00:12

## 2020-05-23 RX ADMIN — POTASSIUM CHLORIDE 20 MEQ: 29.8 INJECTION, SOLUTION INTRAVENOUS at 17:12

## 2020-05-23 RX ADMIN — HEPARIN SODIUM 5000 UNITS: 5000 INJECTION, SOLUTION INTRAVENOUS; SUBCUTANEOUS at 13:37

## 2020-05-23 ASSESSMENT — ACTIVITIES OF DAILY LIVING (ADL)
ADLS_ACUITY_SCORE: 26

## 2020-05-23 ASSESSMENT — MIFFLIN-ST. JEOR: SCORE: 1893.75

## 2020-05-23 NOTE — PROGRESS NOTES
Major Shift Events:  Sedation weaned throughout shift. Unable to follow commands at this time. Pupils intact. One instance of bradycardia and hypotension with stimulation. Otherwise becomes hypertensive and tachycardic with movement. Thick secretions obtained via ETT.  Responded well to diuresis. ECMO site wound vac intact.     Plan: Continue to wean sedation tomorrow as tolerated. Begin feeding when NJ is placed.     For vital signs and complete assessments, please see documentation flowsheets.

## 2020-05-23 NOTE — PROGRESS NOTES
Cardiology Progress Note  Scot Bishop MRN: [id number]  Age: 39 year old, : 1980  Date: 2020            Assessment and Plan:     Scot Bishop is a 39 year old male who was admitted on 2020 for cardiac arrest. Pt reportedly had chest pain that started on 20. He was using Drano on his pipes at home and did not initially seek medical attention for CP and SOB since it said on the box that Drano can cause those symptoms. He took a shower and had syncopal episode after coming out of the shower. EMS was called; initial rhythm asystole. With chest compressions pt eventually had PEA and then eventually had VF in the field. After multiple cycles of epinephrine had ROSC. He was intubated in the field.   Pt was brought to [facility] ED where he regained a pulse and was brought to the Cath Lab for coronary angiogram. Initially, BP was 90s/60s as he was being transported from ED but he had recurrent cardiac arrest on arrival to Cath Lab. ECG showed ST elevation in aVR. He was promptly placed on VA ECMO. He had thrombotic occlusion of proximal LAD and underwent successful aspiration thrombectomy and PCI w/ NAZIA of proximal and mid LAD.     Interval events: Issues w/ hypoxia and vent dyssynchrony yesterday morning. Pulmonary consult and restarted sedation. Improved oxygenation and vent synchrony on Fentanyl, versed, and ketamine. Precedex stopped d/t bradycardia. PaO2 124 on 60% FiO2. Tmax 100F overnight. Added meropenem yesterday.     Today's plan:   -wean versed then ketamine as able   -wean vent as able   -continue vanc/meropenem  -lasix 40 mg IV   -increase FWF to 60 mL q4 hrs for Na 151  -Nutrition consult for post-pyloric FT placement      Neurology: Intubated, sedated. Initial CT head negative. Cooled to 34 degrees on arrival; rewarmed  AM. EEG this AM showed severe diffuse encephalopathy.  -continue fentanyl  -wean versed then ketamine as able   Cardiovascular /  Hemodynamics: Refractory VF arrest    S/p NAZIA to proximal-mid LAD  Sinus tachycardia   Peripheral VA ECMO inserted for cardiac arrest. LA 16 initially. Suspect ongoing tachycardia d/t evolving MI and post-arrest state. ECMO decannulation at bedside on 5/19; IABP discontinued 5/20.  TTE 5/20/20: EF 45-50%, RV normal   EKG: Sinus tachycardia   -continue ASA 81mg daily and ticagrelor 90mg BID   -hold statin for now given likely hepatic injury during arrest  -hold ACE/ARB for now given likely reduced renal fxn after arrest  -holding beta blocker given shock    Pulmonary: Acute hypoxic respiratory failure  COVID negative  Admission CT chest showed bilateral consolidations c/w aspiration PNA. Had thick secretions that required bagging by RT on 5/20. Increasing oxygen requirements and pt-vent dyssynchrony on 5/22. Pulmonary re-consulted, infectious work up, pt sedated.   Vent settings this AM: 20/500/12/60% w/ PaO2 120s.   CXR: b/l opacities. ETT 3.5 cm above the chaim. Lines in stable position.   -pt sedated on fentanyl, versed, ketamine - wean as able  -precedex stopped d/t bradycardia  -vent settings now 20/500/12/60%; ABG 7.40/37/124/23 on these settings   -per pulm, no indication for repeat bronch   -s/p bronchoscopy 5/21  -continue mucomyst and albuterol nebs  -growing GNR in sputum 5/22; continue vanc - meropenem added until speciated     -wean vent as able  -daily CXR  -Q12h ABGs and PRN   -consider scheduled duonebs if signs of lung dz, currently PRN       GI and Nutrition: Shock liver  GIB, resolved    -> 268,  -> 191. T bili 1.1. Had vomiting yesterday so TF held.   -consult Nutrition for post-pyloric FT placement   -attempt to restart TF this AM   -monitor BID LFTs  -continue TF   -bowel regimen   -GI Prophylaxis: Protonix BID for UGIB   Renal, Fluid and Electrolytes: Acute kidney injury   Cr improved to 1.19 this AM. 3.2L UOP and net positive 478 mL yesterday after lasix 40 mg IV x 2 and diamox  "500 mg x1. Na 151 today.   -increase FWF to 60 mL q 4 hrs   -lasix 40 mg IV this AM; possibly repeat dose to keep net neg 500mL to 1L  -monitor I/O  -maintain K>3.8 and Mg>2    Infectious Disease: Aspiration pneumonia  Leukocytosis  WBC 13.3, tmax 100F overnight. Blood cultures negative thus far. Grew Staph aureus in sputum cx.   -vancomycin/zosyn x5 days (5/17-5/22) for asp PNA  -extend vancomycin for S aureus in sputum    -add meropenem for GNR in sputum    -monitor for signs of infection given lines and leukocytosis   Hematology and Oncology: Receiving ASA/ticagrelor for NAZIA. Transfused 1 unit PRBCs 5/21. Hgb 7.4 this AM. No signs of active bleeding.   -transfuse for Hgb<7   -DVT PPX: subcutaneous heparin    Endocrinology: No known medical history. BG elevated.  -not requiring insulin gtt  -f/u HgbA1c    Lines:       R radial arterial line May 17, 2020  ETT May 17, 2020  Silva catheter May 17, 2020  OG tube May 17, 2020  Restraint: not currently needed    Current lines are required for patient management       Family update by me today: Yes     Code Status: Full code     The pt was discussed and evaluated with Dr. Alanis, attending physician, who agrees with the assessment and plan above.     Kala Chiang, TERRENCE CNP          Objective     Temp 100  F (37.8  C)   Resp 20   Ht 1.676 m (5' 6\")   Wt 104.1 kg (229 lb 8 oz)   SpO2 97%   BMI 37.04 kg/m    Temp:  [98.4  F (36.9  C)-101.5  F (38.6  C)] 100  F (37.8  C)  Heart Rate:  [106-136] 126  Resp:  [12-35] 20  MAP:  [56 mmHg-275 mmHg] 80 mmHg  Arterial Line BP: ()/() 118/61  FiO2 (%):  [50 %-100 %] 60 %  SpO2:  [89 %-100 %] 97 %  Wt Readings from Last 2 Encounters:   05/23/20 104.1 kg (229 lb 8 oz)     I/O last 3 completed shifts:  In: 4061.13 [I.V.:1811.13; NG/GT:405]  Out: 3565 [Urine:3065; Emesis/NG output:400; Stool:100]      GENERAL APPEARANCE: Intubated, sedated. NAD.  HEENT: No icterus, PERRL 2 mm, ETT in place, OG tube in " place  CARDIOVASCULAR: regular rhythm, normal S1 and S2, no S3 or S4 and no murmur, click or rub. Normal PMI. Pulses dopplerable.  RESP: Coarse bilaterally. Mechanical ventilation.    GASTRO: Soft, bowel sounds hypoactive but present  GENITOURINARY: Silva in place.  EXTREMITIES: Warm, +1 edema, pulses dopplered.  NEURO: Sedated and intubated, Pupils equal and reactive, 2 mm. Fentanyl, versed, precedex for sedation.  INTEGUMENTARY: No rashes. Line sites CDI  LINES/TUBES/DRAINS: R radial Arterial line. ETT. OG. Silva Catheter.          Data:     Vent Settings:  Resp: 20 SpO2: 97 % O2 Device: Mechanical Ventilator      Arterial Blood Gas:   Recent Labs   Lab 05/23/20  0310 05/22/20  1931 05/22/20  1638 05/22/20  1156   PH 7.40 7.47* 7.49* 7.49*   PCO2 37 40 38 36   PO2 124* 129* 89 81   HCO3 23 29* 28 27   O2PER 60.0 60 60 70       Vitals:    05/21/20 0600 05/22/20 0000 05/23/20 0400   Weight: 102.9 kg (226 lb 13.7 oz) 103.1 kg (227 lb 4.7 oz) 104.1 kg (229 lb 8 oz)   I/O last 3 completed shifts:  In: 4061.13 [I.V.:1811.13; NG/GT:405]  Out: 3565 [Urine:3065; Emesis/NG output:400; Stool:100]  Recent Labs   Lab 05/23/20  0310 05/22/20  1940   * 150*   POTASSIUM 3.7 3.8   CHLORIDE 122* 117*   CO2 24 28   ANIONGAP 6 5   GLC 98 111*   BUN 36* 40*   CR 1.19 1.29*   TEN 8.3* 8.1*     No components found for: URINE   Recent Labs   Lab 05/23/20  0310 05/22/20  1558 05/21/20  1626   * 212* 251*   * 263* 318*   BILITOTAL 1.1 0.9 0.7   ALBUMIN 3.0* 2.7* 2.7*   PROTTOTAL 6.6* 6.2* 6.2*   ALKPHOS 84 87 86     Temp: 100  F (37.8  C) Temp src: EsophagealTemp  Min: 98.4  F (36.9  C)  Max: 101.5  F (38.6  C)   Recent Labs   Lab 05/23/20 0310 05/22/20  1940 05/22/20  1558 05/22/20  0409 05/21/20  1626 05/21/20  0437   WBC 13.3*  --  12.3* 15.9* 13.7* 12.9*   HGB 7.4* 7.2* 6.4* 7.5* 7.6* 7.5*   HCT 23.3* 21.9* 20.2* 23.2* 22.8* 22.7*   MCV 96  --  96 94 93 93   RDW 14.4  --  14.1 14.0 13.9 13.6   *  --  120*  147* 129* 126*     Recent Labs   Lab 20  0437 20  0356 20  2205 20  1550 20  0952 20  0415   INR 1.19* 1.29* 1.24* 1.25* 1.29* 1.40*   PTT  --  30 36 69* 69* 71*     Recent Labs   Lab 20  0310 20  1940 20  1558 20  0409 20  1626   GLC 98 111* 142* 147* 148*       All imaging personally reviewed:  Recent Results (from the past 24 hour(s))   Echocardiogram Complete    Narrative    [id number]  [id number]  [id number]  [id number]^GRIS^YOSHI           [facility],[place]  Echocardiography Laboratory  [address] [place] [id number]     Name: FAINA FORRESTER  MRN: [id number]  : 1980  Study Date: 2020 08:16 AM  Age: 39 yrs  Gender: Male  Patient Location: [facility]  Reason For Study: Cardiac Arrest  Ordering Physician: YOSHI LANDRY  Performed By: Denisse Johnson RDCS     BSA: 2.1 m2  Height: 66 in  Weight: 231 lb  BP: 132/58 mmHg  _____________________________________________________________________________  __        Procedure  Complete Portable Echo Adult. Technically difficult study.Extremely poor  acoustic windows.  _____________________________________________________________________________  __        Interpretation Summary  VA ECMO at 3.7L/min. Technically difficult study. Extremely poor acoustic  windows.  Severely (EF <30%) reduced left ventricular function on extremely limited  views.  The right ventricle cannot be assessed.  No pericardial effusion is present.  Previous study not available for comparison.  _____________________________________________________________________________  __        Left Ventricle  Left ventricular wall thickness cannot evaluate. Left ventricular size is  normal. Severely (EF <30%) reduced left ventricular function is present. Left  ventricular diastolic function is not assessable. Severe diffuse hypokinesis  is present.     Right Ventricle  The right ventricle cannot be assessed. Right  ventricular function cannot be  assessed due to poor image quality.     Mitral Valve  The mitral valve cannot be assessed.        Aortic Valve  The aortic valve opens with each cardiac cycle. On Doppler interrogation,  there is no significant stenosis or regurgitation.     Tricuspid Valve  The tricuspid valve cannot be assessed. Pulmonary artery systolic pressure  cannot be assessed.     Pulmonic Valve  The pulmonic valve cannot be assessed.     Vessels  The aorta root cannot be assessed. The thoracic aorta cannot be assessed.  Venous ECMO cannula is visualized traversing the IVC.     Pericardium  No pericardial effusion is present.     Compared to Previous Study  Previous study not available for comparison.     _____________________________________________________________________________  __                       Report approved by: Ashlee Izquierdo 05/18/2020 09:18 AM                 _____________________________________________________________________________  __                Medications     Current Facility-Administered Medications   Medication     0.9% sodium chloride BOLUS     0.9% sodium chloride BOLUS     acetaminophen (TYLENOL) tablet 650 mg    Or     acetaminophen (TYLENOL) Suppository 650 mg     acetylcysteine (MUCOMYST) 10 % nebulizer solution 4 mL     albuterol (PROVENTIL) neb solution 2.5 mg     artificial tears ophthalmic ointment     aspirin (ASA) chewable tablet 81 mg     calcium chloride in  mL intermittent infusion 1 g     calcium chloride injection 1 g     dexmedetomidine (PRECEDEX) 400 mcg in 0.9% sodium chloride 100 mL     dextrose 10% infusion     glucose gel 15-30 g    Or     dextrose 50 % injection 25-50 mL    Or     glucagon injection 1 mg     fentaNYL (SUBLIMAZE) bolus from infusion pump-ADULT 50 mcg     fentaNYL (SUBLIMAZE) infusion     heparin ANTICOAGULANT injection 5,000 Units     heparin lock flush 10 UNIT/ML injection 2-5 mL     heparin lock flush 10 UNIT/ML injection  5-10 mL     heparin lock flush 10 UNIT/ML injection 5-10 mL     ipratropium - albuterol 0.5 mg/2.5 mg/3 mL (DUONEB) neb solution 3 mL     ketamine (KETALAR) 25 mg/mL in sodium chloride 0.9 % 50 mL SEDATION infusion     lidocaine (LMX4) cream     lidocaine (LMX4) cream     lidocaine (LMX4) cream     lidocaine 1 % 0.1-1 mL     lidocaine 1 % 0.1-1 mL     lidocaine 1 % 0.1-1 mL     lidocaine 1% with EPINEPHrine 1:100,000 30 mL, bupivacaine (MARCAINE) 30 mL     magnesium sulfate 2 g in water intermittent infusion     magnesium sulfate 4 g in 100 mL sterile water (premade)     meropenem (MERREM) 1 g vial to attach to  mL bag     midazolam (VERSED) drip - ADULT 100 mg/100 mL in NS PRE-MIX     multivitamins w/minerals (CERTAVITE) liquid 15 mL     naloxone (NARCAN) injection 0.1-0.4 mg     pantoprazole (PROTONIX) 2 mg/mL suspension 40 mg     potassium chloride (KLOR-CON) Packet 20-40 mEq     potassium chloride 10 mEq in 100 mL intermittent infusion with 10 mg lidocaine     potassium chloride 10 mEq in 100 mL sterile water intermittent infusion (premix)     potassium chloride 20 mEq in 50 mL intermittent infusion     potassium chloride ER (KLOR-CON M) CR tablet 20-40 mEq     senna-docusate (SENOKOT-S/PERICOLACE) 8.6-50 MG per tablet 1 tablet     sodium chloride (PF) 0.9% PF flush 10-20 mL     sodium chloride (PF) 0.9% PF flush 3 mL     sodium chloride (PF) 0.9% PF flush 3 mL     sodium chloride (PF) 0.9% PF flush 5-50 mL     sodium phosphate 10 mmol in D5W intermittent infusion     sodium phosphate 15 mmol in D5W intermittent infusion     sodium phosphate 20 mmol in D5W intermittent infusion     sodium phosphate 25 mmol in D5W intermittent infusion     ticagrelor (BRILINTA) tablet 90 mg     vancomycin (VANCOCIN) 1,750 mg in sodium chloride 0.9 % 250 mL intermittent infusion     vecuronium (NORCURON) 50 mg in D5W 50 mL     vecuronium (NORCURON) injection 10 mg                        I have seen and examined the patient  with the [facility] team. I agree with the assessment and plan of the note above.I have reviewed pertinent labs.     Jefferson Alanis MD  Interventional Cardiology  Pager: [phone]

## 2020-05-23 NOTE — PROGRESS NOTES
Cardiology Critical Care Note - Cardiology  Jefferson Alanis M.D.  Critical Care Diagnoses:  Acute renal failure  Acute myocardial infarction of the LAD  Pneumonia   hypoxia      Critical Care management intervention:  Given  40 mg iv lasix   Started  on presadex, increased Fentanyl to 250 mg   Ketamine 50 mg /hour  And versed at 10 mg    Increased PEEP at 12  Decreased FiO2 to 50  And increased the rate to 20br/min      Spent 45 minutes on critical care management on this patient.    .      Professor Zeke FUNES  Interventional Cariology and Cardiac Critical Care

## 2020-05-23 NOTE — PROGRESS NOTES
Preliminary Video EEG impression on May 22-23, 2020  Until 6am     Full report to follow. Please look in inpatient chart, under procedure section.     Patient information: 39-year-old male who presented with cardiac arrest.  The EEG is being done to evaluate for seizures.     Medications:  Fentanyl 100-200 mcg/hr  Precedex 0.6-0.7 mcg/kg/hr  Versed 3-10 mg/hr  Propofol 10-50 mcg/kg/min-stopped @ 08:33    EEG is diffusely attenuated with intermittent burst of delta theta slowing.  In segments of EEG attenuation the electrocerebral potentials are less than 10  V.  These periods of attenuation are noted in approximately 25 to 50% of the record.  There are burst of theta and delta slowing with maximum involvement in the parasagittal and midline derivations.  Electrocerebral potentials during burst of delta and theta are up to 25  V and in some instances up to 40  V.  There is not a well-formed parieto-occipital rhythm, no sleep architecture, but no sleep-wake distinction.  No generalized periodic pattern is appreciated on this day of recording.    Activation: Auditory and sensory stimuli were applied and EEG was reactive.    EEG is abnormal due to the presence of low voltage delta theta slowing with periods of EEG attenuation.  EEG is consistent with a severe diffuse encephalopathy.  No electrographic seizures, epileptiform discharges, or periodic patterns are seen on this date.  Clinical correlation is advised.     Antonella Franklin MD  Staff Epilepsy Neurologist    [phone]

## 2020-05-23 NOTE — PLAN OF CARE
Major Shift Events:  Patient remains sedated. Pupils 3, brisk, and equal. CEEG. Versed at 10, fentanyl at 125, and ketamine at 50. -130's ST. MAP>65. AC, 50% FiO2, Vt 500, Peep 12, RR 20. Excessive agitation with suctioning. UO  - 120-225 per hour. 500 mg dose of diamox given.     Plan: continue to assess readiness for extubation.  For vital signs and complete assessments, please see documentation flowsheets.

## 2020-05-23 NOTE — PROGRESS NOTES
Nutrition Services - Brief Note    Consulted for FT placement using Cortrak; unable to be placed by RD on weekend d/t staffing. This writer contacted unit 4C to see if weekend nurse on site can place small bore FT.    Interventions already implemented by the RD:  Updated TF orders to reflect small bore FT access, if RN is able to successfully place FT over the holiday weekend.     Recommendations:  If RN is not able to place small bore FT using Cortrak over the holiday weekend, please consult Radiology (XR feeding tube placement) for FT placement.    RD will continue to follow.  Tamara Morales, PATRICIA, LD  ([facility] diet[facility])

## 2020-05-24 ENCOUNTER — APPOINTMENT (OUTPATIENT)
Dept: GENERAL RADIOLOGY | Facility: CLINIC | Age: 40
End: 2020-05-24
Attending: INTERNAL MEDICINE
Payer: MEDICAID

## 2020-05-24 ENCOUNTER — APPOINTMENT (OUTPATIENT)
Dept: GENERAL RADIOLOGY | Facility: CLINIC | Age: 40
End: 2020-05-24
Attending: STUDENT IN AN ORGANIZED HEALTH CARE EDUCATION/TRAINING PROGRAM
Payer: MEDICAID

## 2020-05-24 ENCOUNTER — APPOINTMENT (OUTPATIENT)
Dept: NEUROLOGY | Facility: CLINIC | Age: 40
End: 2020-05-24
Attending: PSYCHIATRY & NEUROLOGY
Payer: MEDICAID

## 2020-05-24 LAB
ALBUMIN SERPL-MCNC: 2.8 G/DL (ref 3.4–5)
ALBUMIN SERPL-MCNC: 3 G/DL (ref 3.4–5)
ALP SERPL-CCNC: 102 U/L (ref 40–150)
ALP SERPL-CCNC: 96 U/L (ref 40–150)
ALT SERPL W P-5'-P-CCNC: 275 U/L (ref 0–70)
ALT SERPL W P-5'-P-CCNC: 290 U/L (ref 0–70)
ANION GAP SERPL CALCULATED.3IONS-SCNC: 6 MMOL/L (ref 3–14)
ANION GAP SERPL CALCULATED.3IONS-SCNC: 9 MMOL/L (ref 3–14)
AST SERPL W P-5'-P-CCNC: 152 U/L (ref 0–45)
AST SERPL W P-5'-P-CCNC: 176 U/L (ref 0–45)
BACTERIA SPEC CULT: ABNORMAL
BACTERIA SPEC CULT: NO GROWTH
BASE DEFICIT BLDA-SCNC: 3.4 MMOL/L
BILIRUB SERPL-MCNC: 1.3 MG/DL (ref 0.2–1.3)
BILIRUB SERPL-MCNC: 1.5 MG/DL (ref 0.2–1.3)
BUN SERPL-MCNC: 35 MG/DL (ref 7–30)
BUN SERPL-MCNC: 38 MG/DL (ref 7–30)
CALCIUM SERPL-MCNC: 8.5 MG/DL (ref 8.5–10.1)
CALCIUM SERPL-MCNC: 8.6 MG/DL (ref 8.5–10.1)
CHLORIDE SERPL-SCNC: 123 MMOL/L (ref 94–109)
CHLORIDE SERPL-SCNC: 123 MMOL/L (ref 94–109)
CO2 SERPL-SCNC: 21 MMOL/L (ref 20–32)
CO2 SERPL-SCNC: 22 MMOL/L (ref 20–32)
CREAT SERPL-MCNC: 1.04 MG/DL (ref 0.66–1.25)
CREAT SERPL-MCNC: 1.2 MG/DL (ref 0.66–1.25)
ERYTHROCYTE [DISTWIDTH] IN BLOOD BY AUTOMATED COUNT: 15 % (ref 10–15)
ERYTHROCYTE [DISTWIDTH] IN BLOOD BY AUTOMATED COUNT: 15.4 % (ref 10–15)
GFR SERPL CREATININE-BSD FRML MDRD: 75 ML/MIN/{1.73_M2}
GFR SERPL CREATININE-BSD FRML MDRD: 89 ML/MIN/{1.73_M2}
GLUCOSE SERPL-MCNC: 100 MG/DL (ref 70–99)
GLUCOSE SERPL-MCNC: 107 MG/DL (ref 70–99)
HCO3 BLD-SCNC: 21 MMOL/L (ref 21–28)
HCT VFR BLD AUTO: 24.4 % (ref 40–53)
HCT VFR BLD AUTO: 25.1 % (ref 40–53)
HGB BLD-MCNC: 7.4 G/DL (ref 13.3–17.7)
HGB BLD-MCNC: 7.7 G/DL (ref 13.3–17.7)
LACTATE BLD-SCNC: 1 MMOL/L (ref 0.7–2)
LACTATE BLD-SCNC: 1.3 MMOL/L (ref 0.7–2)
Lab: NORMAL
MAGNESIUM SERPL-MCNC: 2.2 MG/DL (ref 1.6–2.3)
MAGNESIUM SERPL-MCNC: 2.5 MG/DL (ref 1.6–2.3)
MCH RBC QN AUTO: 30.3 PG (ref 26.5–33)
MCH RBC QN AUTO: 30.4 PG (ref 26.5–33)
MCHC RBC AUTO-ENTMCNC: 30.3 G/DL (ref 31.5–36.5)
MCHC RBC AUTO-ENTMCNC: 30.7 G/DL (ref 31.5–36.5)
MCV RBC AUTO: 100 FL (ref 78–100)
MCV RBC AUTO: 99 FL (ref 78–100)
O2/TOTAL GAS SETTING VFR VENT: 40 %
OXYHGB MFR BLD: 98 % (ref 92–100)
PCO2 BLD: 33 MM HG (ref 35–45)
PH BLD: 7.41 PH (ref 7.35–7.45)
PHOSPHATE SERPL-MCNC: 2.8 MG/DL (ref 2.5–4.5)
PHOSPHATE SERPL-MCNC: 3.3 MG/DL (ref 2.5–4.5)
PLATELET # BLD AUTO: 189 10E9/L (ref 150–450)
PLATELET # BLD AUTO: 214 10E9/L (ref 150–450)
PO2 BLD: 146 MM HG (ref 80–105)
POTASSIUM SERPL-SCNC: 3.7 MMOL/L (ref 3.4–5.3)
POTASSIUM SERPL-SCNC: 3.9 MMOL/L (ref 3.4–5.3)
PROT SERPL-MCNC: 7 G/DL (ref 6.8–8.8)
PROT SERPL-MCNC: 7 G/DL (ref 6.8–8.8)
RBC # BLD AUTO: 2.44 10E12/L (ref 4.4–5.9)
RBC # BLD AUTO: 2.53 10E12/L (ref 4.4–5.9)
SODIUM SERPL-SCNC: 151 MMOL/L (ref 133–144)
SODIUM SERPL-SCNC: 153 MMOL/L (ref 133–144)
SPECIMEN SOURCE: ABNORMAL
SPECIMEN SOURCE: NORMAL
TROPONIN I SERPL-MCNC: 3.6 UG/L (ref 0–0.04)
TROPONIN I SERPL-MCNC: 5.75 UG/L (ref 0–0.04)
WBC # BLD AUTO: 12.9 10E9/L (ref 4–11)
WBC # BLD AUTO: 14 10E9/L (ref 4–11)

## 2020-05-24 PROCEDURE — 95714 VEEG EA 12-26 HR UNMNTR: CPT

## 2020-05-24 PROCEDURE — 25000128 H RX IP 250 OP 636: Performed by: NURSE PRACTITIONER

## 2020-05-24 PROCEDURE — 40000275 ZZH STATISTIC RCP TIME EA 10 MIN

## 2020-05-24 PROCEDURE — 25000128 H RX IP 250 OP 636: Performed by: STUDENT IN AN ORGANIZED HEALTH CARE EDUCATION/TRAINING PROGRAM

## 2020-05-24 PROCEDURE — 83605 ASSAY OF LACTIC ACID: CPT

## 2020-05-24 PROCEDURE — 25000125 ZZHC RX 250: Performed by: NURSE PRACTITIONER

## 2020-05-24 PROCEDURE — 99233 SBSQ HOSP IP/OBS HIGH 50: CPT | Mod: GC | Performed by: INTERNAL MEDICINE

## 2020-05-24 PROCEDURE — 93005 ELECTROCARDIOGRAM TRACING: CPT

## 2020-05-24 PROCEDURE — 25800030 ZZH RX IP 258 OP 636: Performed by: INTERNAL MEDICINE

## 2020-05-24 PROCEDURE — 94640 AIRWAY INHALATION TREATMENT: CPT | Mod: 76

## 2020-05-24 PROCEDURE — 25000132 ZZH RX MED GY IP 250 OP 250 PS 637: Performed by: INTERNAL MEDICINE

## 2020-05-24 PROCEDURE — 25000128 H RX IP 250 OP 636

## 2020-05-24 PROCEDURE — 25000132 ZZH RX MED GY IP 250 OP 250 PS 637: Performed by: STUDENT IN AN ORGANIZED HEALTH CARE EDUCATION/TRAINING PROGRAM

## 2020-05-24 PROCEDURE — 93010 ELECTROCARDIOGRAM REPORT: CPT | Mod: 59 | Performed by: INTERNAL MEDICINE

## 2020-05-24 PROCEDURE — 83735 ASSAY OF MAGNESIUM: CPT | Performed by: NURSE PRACTITIONER

## 2020-05-24 PROCEDURE — 40000986 XR CHEST PORT 1 VW

## 2020-05-24 PROCEDURE — 82805 BLOOD GASES W/O2 SATURATION: CPT

## 2020-05-24 PROCEDURE — 80053 COMPREHEN METABOLIC PANEL: CPT | Performed by: NURSE PRACTITIONER

## 2020-05-24 PROCEDURE — 84100 ASSAY OF PHOSPHORUS: CPT | Performed by: NURSE PRACTITIONER

## 2020-05-24 PROCEDURE — 25800030 ZZH RX IP 258 OP 636: Performed by: NURSE PRACTITIONER

## 2020-05-24 PROCEDURE — 20000004 ZZH R&B ICU UMMC

## 2020-05-24 PROCEDURE — 25000132 ZZH RX MED GY IP 250 OP 250 PS 637: Performed by: NURSE PRACTITIONER

## 2020-05-24 PROCEDURE — 84484 ASSAY OF TROPONIN QUANT: CPT | Performed by: NURSE PRACTITIONER

## 2020-05-24 PROCEDURE — 25000132 ZZH RX MED GY IP 250 OP 250 PS 637

## 2020-05-24 PROCEDURE — 25800030 ZZH RX IP 258 OP 636: Performed by: STUDENT IN AN ORGANIZED HEALTH CARE EDUCATION/TRAINING PROGRAM

## 2020-05-24 PROCEDURE — 83605 ASSAY OF LACTIC ACID: CPT | Performed by: NURSE PRACTITIONER

## 2020-05-24 PROCEDURE — 25000125 ZZHC RX 250: Performed by: INTERNAL MEDICINE

## 2020-05-24 PROCEDURE — 71045 X-RAY EXAM CHEST 1 VIEW: CPT

## 2020-05-24 PROCEDURE — 94003 VENT MGMT INPAT SUBQ DAY: CPT

## 2020-05-24 PROCEDURE — 94640 AIRWAY INHALATION TREATMENT: CPT

## 2020-05-24 PROCEDURE — 85027 COMPLETE CBC AUTOMATED: CPT | Performed by: NURSE PRACTITIONER

## 2020-05-24 RX ORDER — LEVETIRACETAM 10 MG/ML
1000 INJECTION INTRAVASCULAR EVERY 12 HOURS
Status: DISCONTINUED | OUTPATIENT
Start: 2020-05-25 | End: 2020-05-24

## 2020-05-24 RX ORDER — LEVETIRACETAM 10 MG/ML
1000 INJECTION INTRAVASCULAR EVERY 12 HOURS
Status: DISCONTINUED | OUTPATIENT
Start: 2020-05-24 | End: 2020-06-01

## 2020-05-24 RX ORDER — FUROSEMIDE 10 MG/ML
40 INJECTION INTRAMUSCULAR; INTRAVENOUS ONCE
Status: COMPLETED | OUTPATIENT
Start: 2020-05-24 | End: 2020-05-24

## 2020-05-24 RX ADMIN — ACETYLCYSTEINE 4 ML: 100 SOLUTION ORAL; RESPIRATORY (INHALATION) at 16:23

## 2020-05-24 RX ADMIN — HEPARIN SODIUM 5000 UNITS: 5000 INJECTION, SOLUTION INTRAVENOUS; SUBCUTANEOUS at 22:14

## 2020-05-24 RX ADMIN — MEROPENEM 1 G: 1 INJECTION, POWDER, FOR SOLUTION INTRAVENOUS at 00:29

## 2020-05-24 RX ADMIN — LEVETIRACETAM 1000 MG: 10 INJECTION INTRAVENOUS at 23:37

## 2020-05-24 RX ADMIN — KETAMINE HYDROCHLORIDE 25 MG/HR: 100 INJECTION, SOLUTION, CONCENTRATE INTRAMUSCULAR; INTRAVENOUS at 02:30

## 2020-05-24 RX ADMIN — ASPIRIN 81 MG CHEWABLE TABLET 81 MG: 81 TABLET CHEWABLE at 08:53

## 2020-05-24 RX ADMIN — ACETYLCYSTEINE 4 ML: 100 SOLUTION ORAL; RESPIRATORY (INHALATION) at 05:16

## 2020-05-24 RX ADMIN — ACETYLCYSTEINE 4 ML: 100 SOLUTION ORAL; RESPIRATORY (INHALATION) at 11:47

## 2020-05-24 RX ADMIN — MEROPENEM 1 G: 1 INJECTION, POWDER, FOR SOLUTION INTRAVENOUS at 15:36

## 2020-05-24 RX ADMIN — LEVETIRACETAM 2000 MG: 100 INJECTION, SOLUTION INTRAVENOUS at 12:24

## 2020-05-24 RX ADMIN — FENTANYL CITRATE 125 MCG/HR: 50 INJECTION INTRAVENOUS at 06:31

## 2020-05-24 RX ADMIN — ACETYLCYSTEINE 4 ML: 100 SOLUTION ORAL; RESPIRATORY (INHALATION) at 20:26

## 2020-05-24 RX ADMIN — MULTIVITAMIN 15 ML: LIQUID ORAL at 08:53

## 2020-05-24 RX ADMIN — HEPARIN SODIUM 5000 UNITS: 5000 INJECTION, SOLUTION INTRAVENOUS; SUBCUTANEOUS at 06:00

## 2020-05-24 RX ADMIN — ALBUTEROL SULFATE 2.5 MG: 2.5 SOLUTION RESPIRATORY (INHALATION) at 16:23

## 2020-05-24 RX ADMIN — ALBUTEROL SULFATE 2.5 MG: 2.5 SOLUTION RESPIRATORY (INHALATION) at 20:26

## 2020-05-24 RX ADMIN — TICAGRELOR 90 MG: 90 TABLET ORAL at 19:56

## 2020-05-24 RX ADMIN — ACETYLCYSTEINE 4 ML: 100 SOLUTION ORAL; RESPIRATORY (INHALATION) at 08:13

## 2020-05-24 RX ADMIN — ALBUTEROL SULFATE 2.5 MG: 2.5 SOLUTION RESPIRATORY (INHALATION) at 05:17

## 2020-05-24 RX ADMIN — ACETYLCYSTEINE 4 ML: 100 SOLUTION ORAL; RESPIRATORY (INHALATION) at 01:12

## 2020-05-24 RX ADMIN — POTASSIUM CHLORIDE 20 MEQ: 29.8 INJECTION, SOLUTION INTRAVENOUS at 17:48

## 2020-05-24 RX ADMIN — TICAGRELOR 90 MG: 90 TABLET ORAL at 08:53

## 2020-05-24 RX ADMIN — HEPARIN SODIUM 5000 UNITS: 5000 INJECTION, SOLUTION INTRAVENOUS; SUBCUTANEOUS at 13:52

## 2020-05-24 RX ADMIN — ALBUTEROL SULFATE 2.5 MG: 2.5 SOLUTION RESPIRATORY (INHALATION) at 11:47

## 2020-05-24 RX ADMIN — ALBUTEROL SULFATE 2.5 MG: 2.5 SOLUTION RESPIRATORY (INHALATION) at 08:13

## 2020-05-24 RX ADMIN — FUROSEMIDE 40 MG: 10 INJECTION, SOLUTION INTRAMUSCULAR; INTRAVENOUS at 17:54

## 2020-05-24 RX ADMIN — Medication 40 MG: at 08:54

## 2020-05-24 RX ADMIN — Medication 40 MG: at 19:56

## 2020-05-24 RX ADMIN — ALBUTEROL SULFATE 2.5 MG: 2.5 SOLUTION RESPIRATORY (INHALATION) at 01:12

## 2020-05-24 RX ADMIN — ACETAMINOPHEN 650 MG: 325 TABLET ORAL at 00:29

## 2020-05-24 RX ADMIN — MEROPENEM 1 G: 1 INJECTION, POWDER, FOR SOLUTION INTRAVENOUS at 08:52

## 2020-05-24 ASSESSMENT — MIFFLIN-ST. JEOR: SCORE: 1844.75

## 2020-05-24 ASSESSMENT — ACTIVITIES OF DAILY LIVING (ADL)
ADLS_ACUITY_SCORE: 26

## 2020-05-24 NOTE — PLAN OF CARE
Patient PERRLA. Coughing fits continue. Moves all extremities when stimulated. Does not follow commands. Temp 38.4c overnight, tylenol given x1. Sedated with versed at 2, fentanyl at 125 and ketamine at 25 also infusing. 's while resting, 140-150's with stimulation. BP stable, hypertensive with coughing fits. LS coarse/dim with small amounts of white/tan secretions from ETT and bloody/thin oral secretions. Cmv 40% tv 500 PEEP 12 RR 20. Sodium continues to increase, giving 60cc flushes Q4. Needs NJ placed and TF restarted. 1 BM overnight, loose/brown. UO /hr.     Problem: Adult Inpatient Plan of Care  Goal: Plan of Care Review  Outcome: No Change  Goal: Patient-Specific Goal (Individualization)  Outcome: No Change  Goal: Absence of Hospital-Acquired Illness or Injury  Outcome: No Change  Goal: Optimal Comfort and Wellbeing  Outcome: No Change  Goal: Readiness for Transition of Care  Outcome: No Change  Goal: Rounds/Family Conference  Outcome: No Change     Problem: Adjustment to Therapy (Extracorporeal Life Support/ECMO)  Goal: Optimal Adjustment to Therapy  Outcome: No Change     Problem: Bleeding (Extracorporeal Life Support/ECMO)  Goal: Absence of Bleeding  Outcome: No Change

## 2020-05-24 NOTE — ANESTHESIA PREPROCEDURE EVALUATION
"Anesthesia Pre-Procedure Evaluation    Patient: Scot Bishop   MRN:     [id number] Gender:   male   Age:    39 year old :      1980        Preoperative Diagnosis: Cardiac arrest (H) [I46.9]   Procedure(s):  ECMO Decannulation at Bedside     LABS:  CBC:   Lab Results   Component Value Date    WBC 12.4 (H) 2020    WBC 13.3 (H) 2020    HGB 7.5 (L) 2020    HGB 7.4 (L) 2020    HCT 24.1 (L) 2020    HCT 23.3 (L) 2020     2020     (L) 2020     BMP:   Lab Results   Component Value Date     (H) 2020     (H) 2020    POTASSIUM 3.7 2020    POTASSIUM 3.7 2020    CHLORIDE 120 (H) 2020    CHLORIDE 122 (H) 2020    CO2 23 2020    CO2 24 2020    BUN 35 (H) 2020    BUN 36 (H) 2020    CR 1.32 (H) 2020    CR 1.19 2020     (H) 2020    GLC 98 2020     COAGS:   Lab Results   Component Value Date    PTT 30 2020    INR 1.19 (H) 2020    FIBR 607 (H) 2020     POC:   Lab Results   Component Value Date    BGM 95 2020     OTHER:   Lab Results   Component Value Date    PH 7.41 2020    LACT 1.0 2020    TEN 8.6 2020    PHOS 3.5 2020    MAG 2.4 (H) 2020    ALBUMIN 3.0 (L) 2020    PROTTOTAL 7.2 2020     (H) 2020     (H) 2020    ALKPHOS 96 2020    BILITOTAL 1.5 (H) 2020    TSH 1.01 2020    .0 (H) 2020    SED 83 (H) 2020        Preop Vitals    BP Readings from Last 3 Encounters:   No data found for BP    Pulse Readings from Last 3 Encounters:   No data found for Pulse      Resp Readings from Last 3 Encounters:   20 18    SpO2 Readings from Last 3 Encounters:   20 99%      Temp Readings from Last 1 Encounters:   20 38.2  C (100.8  F)    Ht Readings from Last 1 Encounters:   20 1.676 m (5' 6\")      Wt Readings from Last 1 Encounters: " "  05/23/20 104.1 kg (229 lb 8 oz)    Estimated body mass index is 37.04 kg/m  as calculated from the following:    Height as of this encounter: 1.676 m (5' 6\").    Weight as of this encounter: 104.1 kg (229 lb 8 oz).     LDA:  PICC Triple Lumen 05/20/20 Right Basilic . PICC was ready to use. (Active)   Site Assessment Other (Comment) 05/23/20 2000   External Cath Length (cm) 1.5 cm 05/20/20 1900   Extremity Circumference (cm) 35 cm 05/20/20 1900   Dressing Intervention Chlorhexidine patch;Transparent 05/23/20 2000   Dressing Change Due 05/24/20 05/23/20 2000   PICC Comment bloody 05/23/20 2000   Lumen A - Color PURPLE 05/23/20 2000   Lumen A - Status infusing 05/23/20 2000   Lumen A - Cap Change Due 05/26/20 05/23/20 2000   Lumen B - Color GRAY 05/23/20 2000   Lumen B - Status saline locked 05/23/20 2000   Lumen B - Cap Change Due 05/26/20 05/23/20 2000   Lumen C - Color RED 05/23/20 2000   Lumen C - Status infusing 05/23/20 2000   Lumen C - Cap Change Due 05/26/20 05/23/20 2000   Extravasation? No 05/23/20 2000   Line Necessity Yes, meets criteria 05/23/20 2000   Number of days: 3       Arterial Line 05/17/20 Radial (Active)   Site Assessment WDL 05/23/20 2000   Line Status Pulsatile blood flow 05/23/20 2000   Arterine Line Cap Change Due 05/26/20 05/23/20 2000   Art Line Waveform Appropriate 05/23/20 2000   Art Line Interventions Leveled;Connections checked and tightened;Flushed per protocol 05/23/20 2000   Color/Movement/Sensation Capillary refill less than 3 sec 05/23/20 2000   Line Necessity Yes, meets criteria 05/23/20 2000   Dressing Type Transparent 05/23/20 2000   Dressing Status Clean, dry, intact 05/23/20 2000   Dressing Intervention Dressing changed/new dressing 05/18/20 0000   Dressing Change Due 05/24/20 05/23/20 2000   Number of days: 6       ETT (Active)   Secured By Commercial tube robert 05/23/20 1623   Site Appearance Clean and dry 05/23/20 1623   Secured at (cm) to lip 27 cm 05/23/20 1623   Site " of ET Tube Securement At lip 05/23/20 1623   Cuff Pressure - Type minimal occluding volume 05/23/20 0600   Tube Care/Reposition repositioned tube center of mouth 05/23/20 1600   Bite Block Secure and Patent 05/23/20 1623   Safety Measures manual resuscitator at bedside 05/23/20 1623   ETT Cuff Deflation Leak Test Leak 05/22/20 0000   Number of days: 6       Negative Pressure Wound Therapy Groin Right (Active)   Wound Type Surgical 05/23/20 2000   Unit Type VAC Ulta 05/23/20 2000   Dressing Pieces Removed (# of Each Type) Black foam 05/23/20 2000   Cycle Continuous 05/23/20 2000   Target Pressure (mmHg) 125 05/23/20 2000   Dressing Status Clean, dry, intact 05/23/20 2000   [place] changed? No 05/23/20 2000   Output (ml) 0 ml 05/23/20 2000   Number of days: 4       NG/OG/NJ Tube Orogastric 16 fr Center mouth (Active)   Site Description WDL 05/23/20 2000   Status Clamped 05/23/20 2000   Drainage Appearance Brown;Thin;Green 05/23/20 2000   Placement Confirmation [place] unchanged 05/23/20 2000   [place] (cm marking) at nare/mouth 75 cm 05/23/20 2000   Intake (ml) 30 ml 05/23/20 2000   Flush/Free Water (mL) 30 mL 05/23/20 2000   Residual (mL) 0 mL 05/23/20 0800   Container Amount 600 mL 05/23/20 2000   Output (ml) 0 ml 05/23/20 2000   Number of days: 6       Urethral Catheter Temperature probe (Active)   Tube Description Positional 05/23/20 2000   Catheter Care Done;Catheter wipes 05/23/20 2000   Collection Container Standard;Patent 05/23/20 2000   Securement Method Securing device (Describe) 05/23/20 2000   Rationale for Continued Use Strict 1-2 Hour I&O;Deep Sedation/Paralysis 05/23/20 2000   Urine Output 80 mL 05/23/20 2000   Number of days: 2        No past medical history on file.   Past Surgical History:   Procedure Laterality Date     CV CORONARY ANGIOGRAM N/A 5/17/2020    Procedure: Coronary Angiogram;  Surgeon: Chadd Newby MD;  Location:  [facility]     CV EXTRACORPERAL MEMBRANE  OXYGENATION N/A 5/17/2020    Procedure: Extracorporeal Membrance Oxygenation;  Surgeon: Chadd Newby MD;  Location:  [facility]     CV INTRA-AORTIC BALLOON PUMP INSERTION N/A 5/17/2020    Procedure: Intra-Aortic Balloon Pump Insertion;  Surgeon: Chadd Newby MD;  Location:  [facility]     CV PCI STENT DRUG ELUTING N/A 5/17/2020    Procedure: Percutaneous Coronary Intervention Stent Drug Eluting;  Surgeon: Chadd Newby MD;  Location:  [facility]     REMOVE EXTRACORPORAL MEMBRANE OXYGENATOR ADULT (OUTSIDE OR) N/A 5/19/2020    Procedure: ECMO Decannulation at Bedside;  Surgeon: Mariano Workman MD;  Location:  OR      Allergies not on file          JZG FV AN PHYSICAL EXAM    Assessment:   ASA SCORE: 4    H&P: History and physical reviewed and following examination; no interval change.   Smoking Status:  Non-Smoker/Unknown   NPO Status: NPO Appropriate     Plan:   Anes. Type:  General   Pre-Medication: None   Induction:  IV (Standard)   Airway: ETT; Oral   Access/Monitoring: PIV   Maintenance: Balanced     Postop Plan:   Postop Pain: Opioids  Postop Sedation/Airway: Not planned     PONV Management:   Adult Risk Factors:, Non-Smoker, Postop Opioids   Prevention: Ondansetron     CONSENT: Direct conversation   Plan and risks discussed with: Patient                      Will Lomax MD

## 2020-05-24 NOTE — ANESTHESIA POSTPROCEDURE EVALUATION
Anesthesia POST Procedure Evaluation    Patient: Scot Bishop   MRN:     [id number] Gender:   male   Age:    39 year old :      1980        Preoperative Diagnosis: Cardiac arrest (H) [I46.9]   Procedure(s):  ECMO Decannulation at Bedside   Postop Comments: No value filed.     Anesthesia Type: No value filed.       Disposition: ICU            ICU Sign Out: Anesthesiologist/ICU physician sign out WAS performed   Postop Pain Control: Uneventful            Sign Out: Well controlled pain   PONV: No   Neuro/Psych: Uneventful            Sign Out: PLANNED postop sedation   Airway/Respiratory: Uneventful            Sign Out: AIRWAY IN SITU/Resp. Support               Airway in situ/Resp. Support: ETT   CV/Hemodynamics: Uneventful            Sign Out: Acceptable CV status   Other NRE: NONE   DID A NON-ROUTINE EVENT OCCUR? No         Last Anesthesia Record Vitals:  CRNA VITALS  2020 1845 - 2020 1945      2020             Pulse:  89    ART BP:  125/53    ART Mean:  80    Ht Rate:  142    SpO2:  98 %          Last PACU Vitals:  Vitals Value Taken Time   BP     Temp 38.2  C (100.76  F) 2020  7:53 PM   Pulse     Resp 16 2020  7:53 PM   SpO2 98 % 2020  7:53 PM   Temp src     NIBP     Pulse     SpO2     Resp     Temp     Ht Rate     Temp 2     Vitals shown include unvalidated device data.      Electronically Signed By: Will Lomax MD, May 23, 2020, 7:54 PM

## 2020-05-24 NOTE — PROGRESS NOTES
"[facility], [place]   Neurology Daily Note  Scot Bishop  [id number]  05/24/2020    Interval Events: Intermittent tachycardia and bradycardia overnight with HR in the 140-150s with stimulation. Sedation is being weaned.    Objective   Physical Examination   Vitals: Temp 100.2  F (37.9  C)   Resp 17   Ht 1.676 m (5' 6\")   Wt 99.2 kg (218 lb 11.1 oz)   SpO2 98%   BMI 35.30 kg/m    Gen: intubated, sedated  Neuro: Intubated, examined with fentanyl 125, versed 1, and ketamine 25 sedation in place.  Opens eyes to noxious stimuli. Does not follow commands. PERRL. Eyes are conjugate.  No abnormal movements noted.  Withdraws to noxious stimuli x4.    Investigations    Recent Labs   Lab 05/24/20  0419 05/23/20  1537 05/23/20  0310  05/21/20  0437  05/20/20  0356  05/19/20  2205   WBC 12.9* 12.4* 13.3*   < > 12.9*   < > 16.9*  --  16.3*   HGB 7.4* 7.5* 7.4*   < > 7.5*   < > 7.1*  --  8.1*    98 96   < > 93   < > 93  --  93    171 134*   < > 126*   < > 132*  --  139*   INR  --   --   --   --  1.19*  --  1.29*  --  1.24*   * 150* 151*   < > 146*   < > 144  --  143   POTASSIUM 3.9 3.7 3.7   < > 4.1   < > 4.4  --  4.0   CHLORIDE 123* 120* 122*   < > 116*   < > 114*  --  113*   CO2 22 23 24   < > 26   < > 23  --  24   BUN 38* 35* 36*   < > 39*   < > 36*  --  29   CR 1.20 1.32* 1.19   < > 1.32*   < > 1.93*  --  1.81*   ANIONGAP 6 6 6   < > 4   < > 7  --  6   TEN 8.6 8.6 8.3*   < > 8.1*   < > 7.6*  --  7.6*   * 104* 98   < > 134*   < > 130*  130*  --  141*  135*   ALBUMIN 3.0* 3.0* 3.0*   < >  --   --  2.6*  --  2.9*   PROTTOTAL 7.0 7.2 6.6*   < >  --   --  5.5*  --  5.7*   BILITOTAL 1.5* 1.5* 1.1   < >  --   --  0.6  --  0.6   ALKPHOS 96 96 84   < >  --   --  44  --  45   * 293* 268*   < >  --   --  461*  --  525*   * 198* 191*   < >  --   --  382*  --  442*   TROPI 5.746* 8.136* 13.271*   < > 60.432*   < > 103.692*   < >  --     < > = values in this " interval not displayed.     Results for FAINA BISHOP (MRN [id number])   Ref. Range 5/21/2020 04:37   Triglycerides Latest Ref Range: <150 mg/dL 609 (H)     CT head 5/17/2020:  Impression: No acute intracranial pathology.    EEG 5/24:  IMPRESSION: EEG is abnormal due to the presence of diffuse generalized low voltage delta theta slowing and attenuation consistent with a severe encephalopathy.  Patient had 3 subclinical seizures on May 23, 2020 in the left frontal region with no obvious clinical correlate on the video.  The seizure at 17: 23 lasted up to 6 minutes in duration.  Clinical correlation is advised.    Assessment and Plan   Faina Bishop is a 39 year old male with past medical history including back pain who presented 5/17/2020 with asystole cardiac arrest.  Initial CT head without evidence of anoxic injury. Neuro exam continues to evolve as sedation is weaned. EEG in place and overnight there were subclinical seizures reported. Recommend loading levetiracetam and starting maintenance therapy. Will continue to follow exam.     Recommendations  - Load levetiracetam IV 2000 mg  - Start maintenance levetiracetam 1000 mg BID  - Continue VEEG monitoring  - avoid hypotonic fluids  - wean sedation as able  - neurocritical care service will continue to follow    Patient was seen and discussed with Dr. Fidelia Dinh MD  Neurology PGY-4

## 2020-05-24 NOTE — PROGRESS NOTES
Preliminary Video EEG impression on May 23-24, 2020  Until 6am     PATIENT INFORMATION:   39-year-old male who presented with cardiac arrest.  The EEG is being done to evaluate for seizures.      MEDICATIONS:   Fentanyl 125-150 mcg/hr  Ketamine 50mcg/hr  Versed 2mg/hr    TECHNICAL SUMMARY: This video EEG monitoring procedure was performed with 23 scalp electrodes in 10-20 system placements, and additional scalp, precordial and other surface electrodes used for electrical referencing and artifact detection. Video was reviewed intermittently by EEG technologist and physician for electroclinical seizures.     EEG FINDINGS: EEG has low voltage generalized delta and theta activity, electrocerebral potentials are less than 25  V, frequency is 4 to 6 Hz.  Maximum involvement of delta theta burst is noted in the parasagittal and midline electrodes.  The delta theta burst are noted in approximately 50% of the record.  The remainder of the record has EEG attenuation with electrocerebral potentials being less than 5  V.  EEG attenuation is noted in approximately 50% of the record and may last up to 8 seconds in duration.  There is no parieto-occipital rhythm, no sleep architecture, no sleep-wake distinction.    ACTIVATION PROCEDURES: Patient was given auditory and sensory stimuli with the EEG being reactive.      EPILEPTIFORM DISCHARGES: None    ICTAL: There were 3 subclinical seizures at x16: 21, 16: 58, 17: 17.  The seizures are concentrated in the left frontal region.  At the start there is a rhythmic theta discharge, up to 20  V, on a referential montage this rhythm was noted in F7, T3, T5, FZ there is also a rhythmic theta discharge in the right frontal region however I suspect this is part of the baseline EEG and is to be differentiated from the left frontal rhythm.  The subclinical seizures last approximately 30 seconds to 6 minutes in duration.  The 17: 23 seizure was prolonged up to 6 minutes.  The other 2 seizures  were approximately 30 seconds.  On the video patient has no overt clinical manifestations of seizure activity.      IMPRESSION: EEG is abnormal due to the presence of diffuse generalized low voltage delta theta slowing and attenuation consistent with a severe encephalopathy.  Patient had 3 subclinical seizures on May 23, 2020 in the left frontal region with no obvious clinical correlate on the video.  The seizure at 17: 23 lasted up to 6 minutes in duration.  Clinical correlation is advised.    Antonella Franklin MD   EPILEPSY STAFF

## 2020-05-24 NOTE — PROGRESS NOTES
Cardiology Progress Note  Scot Bishop MRN: [id number]  Age: 39 year old, : 1980  Date: 2020            Assessment and Plan:     Scot Bishop is a 39 year old male who was admitted on 2020 for cardiac arrest. Pt reportedly had chest pain that started on 20. He was using Drano on his pipes at home and did not initially seek medical attention for CP and SOB since it said on the box that Drano can cause those symptoms. He took a shower and had syncopal episode after coming out of the shower. EMS was called; initial rhythm asystole. With chest compressions pt eventually had PEA and then eventually had VF in the field. After multiple cycles of epinephrine had ROSC. He was intubated in the field.   Pt was brought to [facility] ED where he regained a pulse and was brought to the Cath Lab for coronary angiogram. Initially, BP was 90s/60s as he was being transported from ED but he had recurrent cardiac arrest on arrival to Cath Lab. ECG showed ST elevation in aVR. He was promptly placed on VA ECMO. He had thrombotic occlusion of proximal LAD and underwent successful aspiration thrombectomy and PCI w/ NAZIA of proximal and mid LAD.     Interval events:  - improvement of hypoxia  - decreasing sedation  - irritability on EEG - keppra loaded and maintenance started       Today's plan:   -wean versed then ketamine as able   -wean vent as able   -continue vanc/meropenem  -lasix 40 mg IV   -keppra loading   - Decreased fio2 to 40% and PEEP to 5     Neurology: Intubated, sedated. Initial CT head negative. Cooled to 34 degrees on arrival; rewarmed  AM. EEG this AM showed severe diffuse encephalopathy.  -continue fentanyl  -wean versed then ketamine as able   Cardiovascular / Hemodynamics: Refractory VF arrest    S/p NAZIA to proximal-mid LAD  Sinus tachycardia   Peripheral VA ECMO inserted for cardiac arrest. LA 16 initially. Suspect ongoing tachycardia d/t evolving MI and  post-arrest state. ECMO decannulation at bedside on 5/19; IABP discontinued 5/20.  TTE 5/20/20: EF 45-50%, RV normal   EKG: Sinus tachycardia   -continue ASA 81mg daily and ticagrelor 90mg BID   -hold statin for now given likely hepatic injury during arrest  -hold ACE/ARB for now given likely reduced renal fxn after arrest  -holding beta blocker given shock    Pulmonary: Acute hypoxic respiratory failure - improving   COVID negative  Admission CT chest showed bilateral consolidations c/w aspiration PNA. Had thick secretions that required bagging by RT on 5/20. Increasing oxygen requirements and pt-vent dyssynchrony on 5/22. Pulmonary re-consulted, infectious work up, pt sedated.   Vent settings this AM: 20/500/5/50% w/ PaO2 120s.   CXR: b/l opacities. ETT 3.5 cm above the chaim. Lines in stable position.   -pt sedated on fentanyl, versed, ketamine - wean as able  -precedex stopped d/t bradycardia  -vent settings now 20/500/12/60%; ABG 7.40/37/124/23 on these settings   -per pulm, no indication for repeat bronch   -s/p bronchoscopy 5/21  -continue mucomyst and albuterol nebs  -growing GNR in sputum 5/22; continue vanc - meropenem added until speciated     -wean vent as able  -daily CXR  -Q12h ABGs and PRN   -consider scheduled duonebs if signs of lung dz, currently PRN       GI and Nutrition: Shock liver  GIB, resolved    -> 268,  -> 191. T bili 1.1. Had vomiting yesterday so TF held.   -consult Nutrition for post-pyloric FT placement   -attempt to restart TF this AM   -monitor BID LFTs  -continue TF   -bowel regimen   -GI Prophylaxis: Protonix BID for UGIB   Renal, Fluid and Electrolytes: Acute kidney injury   Hypenatremia  Cr improved to 1.2 this AM.  -c/w FWF to 60 mL q 4 hrs   -lasix hled as net negative this am   -monitor I/O  -maintain K>3.8 and Mg>2    Infectious Disease: Aspiration pneumonia  Leukocytosis  Blood cultures negative thus far. Grew Staph aureus in sputum cx.   -vancomycin/zosyn x5  "days (5/17-5/22) for asp PNA  -extend vancomycin for S aureus in sputum    -add meropenem for GNR in sputum    -monitor for signs of infection given lines and leukocytosis   Hematology and Oncology: Receiving ASA/ticagrelor for NAZIA. Transfused 1 unit PRBCs 5/21. Hgb 7.4 this AM. No signs of active bleeding.   -transfuse for Hgb<7   -DVT PPX: subcutaneous heparin    Endocrinology: No known medical history. BG elevated.  -not requiring insulin gtt  -f/u HgbA1c    Lines:       R radial arterial line May 17, 2020  ETT May 17, 2020  Silva catheter May 17, 2020  OG tube May 17, 2020  Restraint: not currently needed    Current lines are required for patient management       Family update by me today: Yes     Code Status: Full code     The pt was discussed and evaluated with Dr. Alanis, attending physician, who agrees with the assessment and plan above.     Ravi Carpenter           Objective     Temp 100  F (37.8  C)   Resp 15   Ht 1.676 m (5' 6\")   Wt 99.2 kg (218 lb 11.1 oz)   SpO2 96%   BMI 35.30 kg/m    Temp:  [99.3  F (37.4  C)-101.3  F (38.5  C)] 100  F (37.8  C)  Heart Rate:  [118-140] 132  Resp:  [13-36] 15  MAP:  [80 mmHg-121 mmHg] 98 mmHg  Arterial Line BP: (101-192)/(62-97) 134/74  FiO2 (%):  [40 %-50 %] 40 %  SpO2:  [91 %-100 %] 96 %  Wt Readings from Last 2 Encounters:   05/24/20 99.2 kg (218 lb 11.1 oz)     I/O last 3 completed shifts:  In: 1493.64 [I.V.:1103.64; NG/GT:390]  Out: 2605 [Urine:2405; Emesis/NG output:200]      GENERAL APPEARANCE: Intubated, sedated. NAD.  HEENT: No icterus, PERRL 2 mm, ETT in place, OG tube in place  CARDIOVASCULAR: regular rhythm, normal S1 and S2, no S3 or S4 and no murmur, click or rub. Normal PMI. Pulses dopplerable.  RESP: Coarse bilaterally. Mechanical ventilation.    GASTRO: Soft, bowel sounds hypoactive but present  GENITOURINARY: Silva in place.  EXTREMITIES: Warm, +1 edema, pulses dopplered.  NEURO: Sedated and intubated, Pupils equal and reactive, 2 mm. " Fentanyl, versed, precedex for sedation.  INTEGUMENTARY: No rashes. Line sites CDI  LINES/TUBES/DRAINS: R radial Arterial line. ETT. OG. Silva Catheter.          Data:     Vent Settings:  Resp: 15 SpO2: 96 % O2 Device: Mechanical Ventilator      Arterial Blood Gas:   Recent Labs   Lab 05/24/20  0401 05/23/20  1539 05/23/20  0835 05/23/20  0310   PH 7.41 7.41 7.41 7.40   PCO2 33* 34* 33* 37   PO2 146* 178* 85 124*   HCO3 21 22 21 23   O2PER 40.0 50 50 60.0       Vitals:    05/22/20 0000 05/23/20 0400 05/24/20 0600   Weight: 103.1 kg (227 lb 4.7 oz) 104.1 kg (229 lb 8 oz) 99.2 kg (218 lb 11.1 oz)   I/O last 3 completed shifts:  In: 1493.64 [I.V.:1103.64; NG/GT:390]  Out: 2605 [Urine:2405; Emesis/NG output:200]  Recent Labs   Lab 05/24/20 0419 05/23/20  1537   * 150*   POTASSIUM 3.9 3.7   CHLORIDE 123* 120*   CO2 22 23   ANIONGAP 6 6   * 104*   BUN 38* 35*   CR 1.20 1.32*   TEN 8.6 8.6     No components found for: URINE   Recent Labs   Lab 05/24/20 0419 05/23/20  1537 05/23/20  0310   * 198* 191*   * 293* 268*   BILITOTAL 1.5* 1.5* 1.1   ALBUMIN 3.0* 3.0* 3.0*   PROTTOTAL 7.0 7.2 6.6*   ALKPHOS 96 96 84     Temp: 100  F (37.8  C) Temp src: EsophagealTemp  Min: 99.3  F (37.4  C)  Max: 101.3  F (38.5  C)   Recent Labs   Lab 05/24/20  0419 05/23/20  1537 05/23/20  0310 05/22/20  1940 05/22/20  1558 05/22/20  0409   WBC 12.9* 12.4* 13.3*  --  12.3* 15.9*   HGB 7.4* 7.5* 7.4* 7.2* 6.4* 7.5*   HCT 24.4* 24.1* 23.3* 21.9* 20.2* 23.2*    98 96  --  96 94   RDW 15.0 14.9 14.4  --  14.1 14.0    171 134*  --  120* 147*     Recent Labs   Lab 05/21/20  0437 05/20/20  0356 05/19/20  2205 05/19/20  1550 05/19/20  0952 05/19/20  0415   INR 1.19* 1.29* 1.24* 1.25* 1.29* 1.40*   PTT  --  30 36 69* 69* 71*     Recent Labs   Lab 05/24/20  0419 05/23/20  1537 05/23/20  0310 05/22/20  1940 05/22/20  1558   * 104* 98 111* 142*       All imaging personally reviewed:  Recent Results (from the  past 24 hour(s))   Echocardiogram Complete    Narrative    [id number]  [id number]  [id number]  [id number]^GRIS^YOSHI           [facility],[place]  Echocardiography Laboratory  [address] [place] [id number]     Name: FAINA FORRESTER  MRN: [id number]  : 1980  Study Date: 2020 08:16 AM  Age: 39 yrs  Gender: Male  Patient Location: [facility]  Reason For Study: Cardiac Arrest  Ordering Physician: YOSHI LANDRY  Performed By: Denisse Johnson RDCS     BSA: 2.1 m2  Height: 66 in  Weight: 231 lb  BP: 132/58 mmHg  _____________________________________________________________________________  __        Procedure  Complete Portable Echo Adult. Technically difficult study.Extremely poor  acoustic windows.  _____________________________________________________________________________  __        Interpretation Summary  VA ECMO at 3.7L/min. Technically difficult study. Extremely poor acoustic  windows.  Severely (EF <30%) reduced left ventricular function on extremely limited  views.  The right ventricle cannot be assessed.  No pericardial effusion is present.  Previous study not available for comparison.  _____________________________________________________________________________  __        Left Ventricle  Left ventricular wall thickness cannot evaluate. Left ventricular size is  normal. Severely (EF <30%) reduced left ventricular function is present. Left  ventricular diastolic function is not assessable. Severe diffuse hypokinesis  is present.     Right Ventricle  The right ventricle cannot be assessed. Right ventricular function cannot be  assessed due to poor image quality.     Mitral Valve  The mitral valve cannot be assessed.        Aortic Valve  The aortic valve opens with each cardiac cycle. On Doppler interrogation,  there is no significant stenosis or regurgitation.     Tricuspid Valve  The tricuspid valve cannot be assessed. Pulmonary artery systolic pressure  cannot be assessed.      Pulmonic Valve  The pulmonic valve cannot be assessed.     Vessels  The aorta root cannot be assessed. The thoracic aorta cannot be assessed.  Venous ECMO cannula is visualized traversing the IVC.     Pericardium  No pericardial effusion is present.     Compared to Previous Study  Previous study not available for comparison.     _____________________________________________________________________________  __                       Report approved by: Ashlee Izquierdo 05/18/2020 09:18 AM                 _____________________________________________________________________________  __                Medications     Current Facility-Administered Medications   Medication     0.9% sodium chloride BOLUS     0.9% sodium chloride BOLUS     acetaminophen (TYLENOL) tablet 650 mg    Or     acetaminophen (TYLENOL) Suppository 650 mg     acetylcysteine (MUCOMYST) 10 % nebulizer solution 4 mL     albuterol (PROVENTIL) neb solution 2.5 mg     artificial tears ophthalmic ointment     aspirin (ASA) chewable tablet 81 mg     calcium chloride in  mL intermittent infusion 1 g     calcium chloride injection 1 g     dextrose 10% infusion     glucose gel 15-30 g    Or     dextrose 50 % injection 25-50 mL    Or     glucagon injection 1 mg     fentaNYL (SUBLIMAZE) bolus from infusion pump-ADULT 50 mcg     fentaNYL (SUBLIMAZE) infusion     heparin ANTICOAGULANT injection 5,000 Units     heparin lock flush 10 UNIT/ML injection 2-5 mL     heparin lock flush 10 UNIT/ML injection 5-10 mL     heparin lock flush 10 UNIT/ML injection 5-10 mL     ipratropium - albuterol 0.5 mg/2.5 mg/3 mL (DUONEB) neb solution 3 mL     ketamine (KETALAR) 25 mg/mL in sodium chloride 0.9 % 50 mL SEDATION infusion     levETIRAcetam (KEPPRA) intermittent infusion 1,000 mg     lidocaine (LMX4) cream     lidocaine (LMX4) cream     lidocaine (LMX4) cream     lidocaine 1 % 0.1-1 mL     lidocaine 1 % 0.1-1 mL     lidocaine 1 % 0.1-1 mL     lidocaine 1% with  EPINEPHrine 1:100,000 30 mL, bupivacaine (MARCAINE) 30 mL     magnesium sulfate 2 g in water intermittent infusion     magnesium sulfate 4 g in 100 mL sterile water (premade)     meropenem (MERREM) 1 g vial to attach to  mL bag     midazolam (VERSED) drip - ADULT 100 mg/100 mL in NS PRE-MIX     multivitamins w/minerals (CERTAVITE) liquid 15 mL     naloxone (NARCAN) injection 0.1-0.4 mg     pantoprazole (PROTONIX) 2 mg/mL suspension 40 mg     potassium chloride (KLOR-CON) Packet 20-40 mEq     potassium chloride 10 mEq in 100 mL intermittent infusion with 10 mg lidocaine     potassium chloride 10 mEq in 100 mL sterile water intermittent infusion (premix)     potassium chloride 20 mEq in 50 mL intermittent infusion     potassium chloride ER (KLOR-CON M) CR tablet 20-40 mEq     senna-docusate (SENOKOT-S/PERICOLACE) 8.6-50 MG per tablet 1 tablet     sodium chloride (PF) 0.9% PF flush 10-20 mL     sodium chloride (PF) 0.9% PF flush 3 mL     sodium chloride (PF) 0.9% PF flush 3 mL     sodium chloride (PF) 0.9% PF flush 5-50 mL     sodium phosphate 10 mmol in D5W intermittent infusion     sodium phosphate 15 mmol in D5W intermittent infusion     sodium phosphate 20 mmol in D5W intermittent infusion     sodium phosphate 25 mmol in D5W intermittent infusion     ticagrelor (BRILINTA) tablet 90 mg                        I have seen and examined the patient with the [facility] team. I agree with the assessment and plan of the note above.I have reviewed pertinent labs.     Jefferson Alanis MD  Interventional Cardiology  Pager: [phone]

## 2020-05-25 ENCOUNTER — APPOINTMENT (OUTPATIENT)
Dept: GENERAL RADIOLOGY | Facility: CLINIC | Age: 40
End: 2020-05-25
Attending: NURSE PRACTITIONER
Payer: MEDICAID

## 2020-05-25 ENCOUNTER — APPOINTMENT (OUTPATIENT)
Dept: CT IMAGING | Facility: CLINIC | Age: 40
End: 2020-05-25
Attending: NURSE PRACTITIONER
Payer: MEDICAID

## 2020-05-25 ENCOUNTER — APPOINTMENT (OUTPATIENT)
Dept: NEUROLOGY | Facility: CLINIC | Age: 40
End: 2020-05-25
Attending: PSYCHIATRY & NEUROLOGY
Payer: MEDICAID

## 2020-05-25 LAB
ABO + RH BLD: NORMAL
ABO + RH BLD: NORMAL
ALBUMIN SERPL-MCNC: 2.8 G/DL (ref 3.4–5)
ALBUMIN SERPL-MCNC: 2.9 G/DL (ref 3.4–5)
ALP SERPL-CCNC: 105 U/L (ref 40–150)
ALP SERPL-CCNC: 110 U/L (ref 40–150)
ALT SERPL W P-5'-P-CCNC: 263 U/L (ref 0–70)
ALT SERPL W P-5'-P-CCNC: 268 U/L (ref 0–70)
ANION GAP SERPL CALCULATED.3IONS-SCNC: 5 MMOL/L (ref 3–14)
ANION GAP SERPL CALCULATED.3IONS-SCNC: 8 MMOL/L (ref 3–14)
AST SERPL W P-5'-P-CCNC: 123 U/L (ref 0–45)
AST SERPL W P-5'-P-CCNC: 132 U/L (ref 0–45)
BACTERIA SPEC CULT: NO GROWTH
BASE DEFICIT BLDA-SCNC: 0.9 MMOL/L
BILIRUB SERPL-MCNC: 1.2 MG/DL (ref 0.2–1.3)
BILIRUB SERPL-MCNC: 1.3 MG/DL (ref 0.2–1.3)
BLD GP AB SCN SERPL QL: NORMAL
BLD PROD TYP BPU: NORMAL
BLOOD BANK CMNT PATIENT-IMP: NORMAL
BUN SERPL-MCNC: 38 MG/DL (ref 7–30)
BUN SERPL-MCNC: 38 MG/DL (ref 7–30)
CALCIUM SERPL-MCNC: 8.4 MG/DL (ref 8.5–10.1)
CALCIUM SERPL-MCNC: 8.6 MG/DL (ref 8.5–10.1)
CHLORIDE SERPL-SCNC: 123 MMOL/L (ref 94–109)
CHLORIDE SERPL-SCNC: 123 MMOL/L (ref 94–109)
CO2 SERPL-SCNC: 23 MMOL/L (ref 20–32)
CO2 SERPL-SCNC: 26 MMOL/L (ref 20–32)
CREAT SERPL-MCNC: 1.08 MG/DL (ref 0.66–1.25)
CREAT SERPL-MCNC: 1.09 MG/DL (ref 0.66–1.25)
ERYTHROCYTE [DISTWIDTH] IN BLOOD BY AUTOMATED COUNT: 15.6 % (ref 10–15)
ERYTHROCYTE [DISTWIDTH] IN BLOOD BY AUTOMATED COUNT: 15.7 % (ref 10–15)
GFR SERPL CREATININE-BSD FRML MDRD: 84 ML/MIN/{1.73_M2}
GFR SERPL CREATININE-BSD FRML MDRD: 85 ML/MIN/{1.73_M2}
GLUCOSE BLDC GLUCOMTR-MCNC: 101 MG/DL (ref 70–99)
GLUCOSE BLDC GLUCOMTR-MCNC: 110 MG/DL (ref 70–99)
GLUCOSE SERPL-MCNC: 104 MG/DL (ref 70–99)
GLUCOSE SERPL-MCNC: 104 MG/DL (ref 70–99)
HBA1C MFR BLD: 5.2 % (ref 0–5.6)
HCO3 BLD-SCNC: 23 MMOL/L (ref 21–28)
HCT VFR BLD AUTO: 26.1 % (ref 40–53)
HCT VFR BLD AUTO: 26.2 % (ref 40–53)
HGB BLD-MCNC: 8 G/DL (ref 13.3–17.7)
HGB BLD-MCNC: 8.1 G/DL (ref 13.3–17.7)
INTERPRETATION ECG - MUSE: NORMAL
LACTATE BLD-SCNC: 1 MMOL/L (ref 0.7–2)
LACTATE BLD-SCNC: 1.1 MMOL/L (ref 0.7–2)
MAGNESIUM SERPL-MCNC: 2.2 MG/DL (ref 1.6–2.3)
MAGNESIUM SERPL-MCNC: 2.3 MG/DL (ref 1.6–2.3)
MCH RBC QN AUTO: 30.7 PG (ref 26.5–33)
MCH RBC QN AUTO: 31.3 PG (ref 26.5–33)
MCHC RBC AUTO-ENTMCNC: 30.7 G/DL (ref 31.5–36.5)
MCHC RBC AUTO-ENTMCNC: 30.9 G/DL (ref 31.5–36.5)
MCV RBC AUTO: 100 FL (ref 78–100)
MCV RBC AUTO: 101 FL (ref 78–100)
NUM BPU REQUESTED: 2
O2/TOTAL GAS SETTING VFR VENT: 40 %
OXYHGB MFR BLD: 96 % (ref 92–100)
PCO2 BLD: 32 MM HG (ref 35–45)
PH BLD: 7.45 PH (ref 7.35–7.45)
PHOSPHATE SERPL-MCNC: 3 MG/DL (ref 2.5–4.5)
PHOSPHATE SERPL-MCNC: 3.3 MG/DL (ref 2.5–4.5)
PLATELET # BLD AUTO: 245 10E9/L (ref 150–450)
PLATELET # BLD AUTO: 264 10E9/L (ref 150–450)
PO2 BLD: 94 MM HG (ref 80–105)
POTASSIUM SERPL-SCNC: 3.4 MMOL/L (ref 3.4–5.3)
POTASSIUM SERPL-SCNC: 3.6 MMOL/L (ref 3.4–5.3)
POTASSIUM SERPL-SCNC: 3.7 MMOL/L (ref 3.4–5.3)
PROT SERPL-MCNC: 7.1 G/DL (ref 6.8–8.8)
PROT SERPL-MCNC: 7.4 G/DL (ref 6.8–8.8)
RBC # BLD AUTO: 2.59 10E12/L (ref 4.4–5.9)
RBC # BLD AUTO: 2.61 10E12/L (ref 4.4–5.9)
SODIUM SERPL-SCNC: 153 MMOL/L (ref 133–144)
SODIUM SERPL-SCNC: 154 MMOL/L (ref 133–144)
SPECIMEN EXP DATE BLD: NORMAL
SPECIMEN SOURCE: NORMAL
TROPONIN I SERPL-MCNC: 1.84 UG/L (ref 0–0.04)
TROPONIN I SERPL-MCNC: 2.62 UG/L (ref 0–0.04)
WBC # BLD AUTO: 15.2 10E9/L (ref 4–11)
WBC # BLD AUTO: 16.3 10E9/L (ref 4–11)

## 2020-05-25 PROCEDURE — 84484 ASSAY OF TROPONIN QUANT: CPT | Performed by: NURSE PRACTITIONER

## 2020-05-25 PROCEDURE — 25800030 ZZH RX IP 258 OP 636: Performed by: NURSE PRACTITIONER

## 2020-05-25 PROCEDURE — 94640 AIRWAY INHALATION TREATMENT: CPT | Mod: 76

## 2020-05-25 PROCEDURE — 71045 X-RAY EXAM CHEST 1 VIEW: CPT

## 2020-05-25 PROCEDURE — 40000281 ZZH STATISTIC TRANSPORT TIME EA 15 MIN

## 2020-05-25 PROCEDURE — 93005 ELECTROCARDIOGRAM TRACING: CPT

## 2020-05-25 PROCEDURE — 94003 VENT MGMT INPAT SUBQ DAY: CPT

## 2020-05-25 PROCEDURE — 83605 ASSAY OF LACTIC ACID: CPT | Performed by: NURSE PRACTITIONER

## 2020-05-25 PROCEDURE — 25000125 ZZHC RX 250: Performed by: NURSE PRACTITIONER

## 2020-05-25 PROCEDURE — 25000132 ZZH RX MED GY IP 250 OP 250 PS 637

## 2020-05-25 PROCEDURE — 25000132 ZZH RX MED GY IP 250 OP 250 PS 637: Performed by: NURSE PRACTITIONER

## 2020-05-25 PROCEDURE — 84100 ASSAY OF PHOSPHORUS: CPT | Performed by: NURSE PRACTITIONER

## 2020-05-25 PROCEDURE — 70450 CT HEAD/BRAIN W/O DYE: CPT

## 2020-05-25 PROCEDURE — 99207 ZZC APP CREDIT; MD BILLING SHARED VISIT: CPT | Performed by: NURSE PRACTITIONER

## 2020-05-25 PROCEDURE — 00000146 ZZHCL STATISTIC GLUCOSE BY METER IP

## 2020-05-25 PROCEDURE — 83735 ASSAY OF MAGNESIUM: CPT | Performed by: NURSE PRACTITIONER

## 2020-05-25 PROCEDURE — 94640 AIRWAY INHALATION TREATMENT: CPT

## 2020-05-25 PROCEDURE — 99232 SBSQ HOSP IP/OBS MODERATE 35: CPT | Mod: GC | Performed by: INTERNAL MEDICINE

## 2020-05-25 PROCEDURE — 84132 ASSAY OF SERUM POTASSIUM: CPT | Performed by: NURSE PRACTITIONER

## 2020-05-25 PROCEDURE — 99291 CRITICAL CARE FIRST HOUR: CPT | Mod: 25 | Performed by: INTERNAL MEDICINE

## 2020-05-25 PROCEDURE — 25000128 H RX IP 250 OP 636: Performed by: STUDENT IN AN ORGANIZED HEALTH CARE EDUCATION/TRAINING PROGRAM

## 2020-05-25 PROCEDURE — 25000125 ZZHC RX 250: Performed by: INTERNAL MEDICINE

## 2020-05-25 PROCEDURE — 95714 VEEG EA 12-26 HR UNMNTR: CPT

## 2020-05-25 PROCEDURE — 80053 COMPREHEN METABOLIC PANEL: CPT | Performed by: NURSE PRACTITIONER

## 2020-05-25 PROCEDURE — 25000132 ZZH RX MED GY IP 250 OP 250 PS 637: Performed by: STUDENT IN AN ORGANIZED HEALTH CARE EDUCATION/TRAINING PROGRAM

## 2020-05-25 PROCEDURE — 83605 ASSAY OF LACTIC ACID: CPT

## 2020-05-25 PROCEDURE — 25000132 ZZH RX MED GY IP 250 OP 250 PS 637: Performed by: INTERNAL MEDICINE

## 2020-05-25 PROCEDURE — 25000128 H RX IP 250 OP 636: Performed by: NURSE PRACTITIONER

## 2020-05-25 PROCEDURE — 40000014 ZZH STATISTIC ARTERIAL MONITORING DAILY

## 2020-05-25 PROCEDURE — 40000275 ZZH STATISTIC RCP TIME EA 10 MIN

## 2020-05-25 PROCEDURE — 25000128 H RX IP 250 OP 636

## 2020-05-25 PROCEDURE — 25800030 ZZH RX IP 258 OP 636: Performed by: INTERNAL MEDICINE

## 2020-05-25 PROCEDURE — 82805 BLOOD GASES W/O2 SATURATION: CPT

## 2020-05-25 PROCEDURE — 85027 COMPLETE CBC AUTOMATED: CPT | Performed by: NURSE PRACTITIONER

## 2020-05-25 PROCEDURE — 83036 HEMOGLOBIN GLYCOSYLATED A1C: CPT | Performed by: NURSE PRACTITIONER

## 2020-05-25 PROCEDURE — 20000004 ZZH R&B ICU UMMC

## 2020-05-25 PROCEDURE — 93010 ELECTROCARDIOGRAM REPORT: CPT | Performed by: INTERNAL MEDICINE

## 2020-05-25 RX ORDER — QUETIAPINE FUMARATE 25 MG/1
25 TABLET, FILM COATED ORAL EVERY MORNING
Status: DISCONTINUED | OUTPATIENT
Start: 2020-05-26 | End: 2020-05-27

## 2020-05-25 RX ORDER — FUROSEMIDE 10 MG/ML
40 INJECTION INTRAMUSCULAR; INTRAVENOUS ONCE
Status: COMPLETED | OUTPATIENT
Start: 2020-05-25 | End: 2020-05-25

## 2020-05-25 RX ORDER — DEXMEDETOMIDINE HYDROCHLORIDE 4 UG/ML
0.2-0.7 INJECTION, SOLUTION INTRAVENOUS CONTINUOUS
Status: DISCONTINUED | OUTPATIENT
Start: 2020-05-25 | End: 2020-05-28

## 2020-05-25 RX ORDER — QUETIAPINE FUMARATE 25 MG/1
50 TABLET, FILM COATED ORAL EVERY EVENING
Status: DISCONTINUED | OUTPATIENT
Start: 2020-05-25 | End: 2020-05-27

## 2020-05-25 RX ADMIN — LEVETIRACETAM 1000 MG: 10 INJECTION INTRAVENOUS at 12:16

## 2020-05-25 RX ADMIN — ALBUTEROL SULFATE 2.5 MG: 2.5 SOLUTION RESPIRATORY (INHALATION) at 13:19

## 2020-05-25 RX ADMIN — Medication 50 MCG: at 08:17

## 2020-05-25 RX ADMIN — ACETYLCYSTEINE 4 ML: 100 SOLUTION ORAL; RESPIRATORY (INHALATION) at 08:51

## 2020-05-25 RX ADMIN — ALBUTEROL SULFATE 2.5 MG: 2.5 SOLUTION RESPIRATORY (INHALATION) at 19:49

## 2020-05-25 RX ADMIN — ACETYLCYSTEINE 4 ML: 100 SOLUTION ORAL; RESPIRATORY (INHALATION) at 19:49

## 2020-05-25 RX ADMIN — TICAGRELOR 90 MG: 90 TABLET ORAL at 19:28

## 2020-05-25 RX ADMIN — ACETYLCYSTEINE 4 ML: 100 SOLUTION ORAL; RESPIRATORY (INHALATION) at 17:01

## 2020-05-25 RX ADMIN — KETAMINE HYDROCHLORIDE 12 MG/HR: 100 INJECTION, SOLUTION, CONCENTRATE INTRAMUSCULAR; INTRAVENOUS at 15:44

## 2020-05-25 RX ADMIN — ACETAMINOPHEN 650 MG: 325 TABLET ORAL at 07:58

## 2020-05-25 RX ADMIN — POTASSIUM CHLORIDE 20 MEQ: 29.8 INJECTION, SOLUTION INTRAVENOUS at 05:28

## 2020-05-25 RX ADMIN — FUROSEMIDE 40 MG: 10 INJECTION, SOLUTION INTRAMUSCULAR; INTRAVENOUS at 14:37

## 2020-05-25 RX ADMIN — LEVETIRACETAM 1000 MG: 10 INJECTION INTRAVENOUS at 22:58

## 2020-05-25 RX ADMIN — ACETAMINOPHEN 650 MG: 325 TABLET ORAL at 00:43

## 2020-05-25 RX ADMIN — Medication 50 MCG: at 12:53

## 2020-05-25 RX ADMIN — MEROPENEM 1 G: 1 INJECTION, POWDER, FOR SOLUTION INTRAVENOUS at 07:56

## 2020-05-25 RX ADMIN — Medication 50 MCG: at 11:07

## 2020-05-25 RX ADMIN — HEPARIN SODIUM 5000 UNITS: 5000 INJECTION, SOLUTION INTRAVENOUS; SUBCUTANEOUS at 21:57

## 2020-05-25 RX ADMIN — DEXMEDETOMIDINE 0.2 MCG/KG/HR: 100 INJECTION, SOLUTION, CONCENTRATE INTRAVENOUS at 16:59

## 2020-05-25 RX ADMIN — POTASSIUM CHLORIDE 20 MEQ: 1.5 POWDER, FOR SOLUTION ORAL at 19:28

## 2020-05-25 RX ADMIN — ACETYLCYSTEINE 4 ML: 100 SOLUTION ORAL; RESPIRATORY (INHALATION) at 13:19

## 2020-05-25 RX ADMIN — MULTIVITAMIN 15 ML: LIQUID ORAL at 07:58

## 2020-05-25 RX ADMIN — FENTANYL CITRATE 75 MCG/HR: 50 INJECTION INTRAVENOUS at 19:34

## 2020-05-25 RX ADMIN — QUETIAPINE FUMARATE 50 MG: 25 TABLET ORAL at 19:28

## 2020-05-25 RX ADMIN — MEROPENEM 1 G: 1 INJECTION, POWDER, FOR SOLUTION INTRAVENOUS at 00:43

## 2020-05-25 RX ADMIN — TICAGRELOR 90 MG: 90 TABLET ORAL at 07:58

## 2020-05-25 RX ADMIN — FENTANYL CITRATE 125 MCG/HR: 50 INJECTION INTRAVENOUS at 02:05

## 2020-05-25 RX ADMIN — Medication 40 MG: at 19:29

## 2020-05-25 RX ADMIN — POTASSIUM CHLORIDE 20 MEQ: 29.8 INJECTION, SOLUTION INTRAVENOUS at 12:55

## 2020-05-25 RX ADMIN — Medication 40 MG: at 07:58

## 2020-05-25 RX ADMIN — HEPARIN SODIUM 5000 UNITS: 5000 INJECTION, SOLUTION INTRAVENOUS; SUBCUTANEOUS at 06:22

## 2020-05-25 RX ADMIN — ALBUTEROL SULFATE 2.5 MG: 2.5 SOLUTION RESPIRATORY (INHALATION) at 17:01

## 2020-05-25 RX ADMIN — ALBUTEROL SULFATE 2.5 MG: 2.5 SOLUTION RESPIRATORY (INHALATION) at 08:51

## 2020-05-25 RX ADMIN — ACETYLCYSTEINE 4 ML: 100 SOLUTION ORAL; RESPIRATORY (INHALATION) at 05:03

## 2020-05-25 RX ADMIN — HEPARIN SODIUM 5000 UNITS: 5000 INJECTION, SOLUTION INTRAVENOUS; SUBCUTANEOUS at 14:36

## 2020-05-25 RX ADMIN — MEROPENEM 1 G: 1 INJECTION, POWDER, FOR SOLUTION INTRAVENOUS at 15:48

## 2020-05-25 RX ADMIN — ACETYLCYSTEINE 4 ML: 100 SOLUTION ORAL; RESPIRATORY (INHALATION) at 01:06

## 2020-05-25 RX ADMIN — ALBUTEROL SULFATE 2.5 MG: 2.5 SOLUTION RESPIRATORY (INHALATION) at 01:05

## 2020-05-25 RX ADMIN — ALBUTEROL SULFATE 2.5 MG: 2.5 SOLUTION RESPIRATORY (INHALATION) at 05:03

## 2020-05-25 RX ADMIN — ASPIRIN 81 MG CHEWABLE TABLET 81 MG: 81 TABLET CHEWABLE at 07:58

## 2020-05-25 ASSESSMENT — MIFFLIN-ST. JEOR: SCORE: 1808.75

## 2020-05-25 ASSESSMENT — ACTIVITIES OF DAILY LIVING (ADL)
ADLS_ACUITY_SCORE: 26

## 2020-05-25 NOTE — PROGRESS NOTES
Cardiology Progress Note  Scot Bishop MRN: [id number]  Age: 39 year old, : 1980  Date: 2020            Assessment and Plan:     Scot Bishop is a 39 year old male who was admitted on 2020 for cardiac arrest. Pt reportedly had chest pain that started on 20. He was using Drano on his pipes at home and did not initially seek medical attention for CP and SOB since it said on the box that Drano can cause those symptoms. He took a shower and had syncopal episode after coming out of the shower. EMS was called; initial rhythm asystole. With chest compressions pt eventually had PEA and then eventually had VF in the field. After multiple cycles of epinephrine had ROSC. He was intubated in the field.   Pt was brought to [facility] ED where he regained a pulse and was brought to the Cath Lab for coronary angiogram. Initially, BP was 90s/60s as he was being transported from ED but he had recurrent cardiac arrest on arrival to Cath Lab. ECG showed ST elevation in aVR. He was promptly placed on VA ECMO. He had thrombotic occlusion of proximal LAD and underwent successful aspiration thrombectomy and PCI w/ NAZIA of proximal and mid LAD.     Interval events: Ketamine increased to 25 mg/hr d/t coughing fits overnight. Received tylenol for temp 38.2. Moving all extremities but not to command. Subclinical seizures on EEG report from ; Neurocritical Care consulted and Keppra added per their recs.     Today's plan:   -PST   -diuresis   -wean ketamine  -add precedex  -add seroquel 25 mg qAM and 50 mg qPM   -repeat CT head   -Nutrition consulted for post-pyloric TF placement    Neurology: Intubated, sedated. Initial CT head negative. Cooled to 34 degrees on arrival; rewarmed  AM. EEG this AM showed severe diffuse encephalopathy without seizures or epileptiform discharges.  -continue fentanyl  -wean versed then ketamine as able   Cardiovascular / Hemodynamics: Refractory VF arrest     S/p NAZIA to proximal-mid LAD  Sinus tachycardia   Peripheral VA ECMO inserted for cardiac arrest. LA 16 initially. Suspect ongoing tachycardia d/t evolving MI and post-arrest state. ECMO decannulation at bedside on 5/19; IABP discontinued 5/20.  TTE 5/20/20: EF 45-50%, RV normal   EKG: Sinus tachycardia   -continue ASA 81mg daily and ticagrelor 90mg BID   -hold statin for now given likely hepatic injury during arrest  -hold ACE/ARB for now given likely reduced renal fxn after arrest  -holding beta blocker given shock    Pulmonary: Acute hypoxic respiratory failure  COVID negative  Admission CT chest showed bilateral consolidations c/w aspiration PNA. Had thick secretions that required bagging by RT on 5/20. Increasing oxygen requirements and pt-vent dyssynchrony on 5/22. Pulmonary re-consulted, infectious work up, pt sedated.   Vent settings this AM: 20/500/5/40% w/ PaO2 94.   CXR: b/l opacities. Lines in stable position.   -pt sedated on fentanyl and ketamine - wean as able  -vent settings now 20/500/5/40%; ABG 7.45/32/94/23 on these settings   -s/p bronchoscopy 5/21  -continue mucomyst and albuterol nebs  -growing GNR in sputum 5/22; continue meropenem     -wean vent as able  -daily CXR  -Q12h ABGs and PRN   -consider scheduled duonebs if signs of lung dz, currently PRN       GI and Nutrition: Shock liver  GIB, resolved    -> 268,  -> 191. T bili 1.1. Had vomiting yesterday so TF held.   -consult Nutrition for post-pyloric FT placement   -attempt to restart TF this AM   -monitor BID LFTs  -continue TF   -bowel regimen   -GI Prophylaxis: Protonix BID for UGIB   Renal, Fluid and Electrolytes: Acute kidney injury   Cr improved to 1.09 this AM. 2.6L UOP and net negative ~1.5L yesterday after lasix 40 mg IV x 1. Na 154 today.   -increase FWF to 60 mL q 1 hr   -lasix 40 mg IV this AM  -monitor I/O  -maintain K>3.8 and Mg>2    Infectious Disease: Aspiration pneumonia  Leukocytosis  WBC 15.2, tmax 100F  "overnight. Blood cultures negative thus far. Grew Staph aureus in sputum cx.   -vancomycin/zosyn x5 days (5/17-5/22) for asp PNA  -vancomycin discontinued   -meropenem for GNR in sputum    -monitor for signs of infection given lines and leukocytosis   Hematology and Oncology: Receiving ASA/ticagrelor for NAZIA. Transfused 1 unit PRBCs 5/21. Hgb 8.0 this AM. No signs of active bleeding.   -transfuse for Hgb<7   -DVT PPX: subcutaneous heparin    Endocrinology: No known medical history. BG elevated.  -not requiring insulin gtt  -check HgbA1c today     Lines:       R radial arterial line May 17, 2020  ETT May 17, 2020  Silva catheter May 17, 2020  OG tube May 17, 2020  Restraint: not currently needed    Current lines are required for patient management       Family update by me today: Yes     Code Status: Full code     The pt was discussed and evaluated with Dr. Matamoros, attending physician, who agrees with the assessment and plan above.     Kala Chiang, APRN CNP          Objective     Temp 100.4  F (38  C)   Resp 20   Ht 1.676 m (5' 6\")   Wt 95.6 kg (210 lb 12.2 oz)   SpO2 96%   BMI 34.02 kg/m    Temp:  [99.3  F (37.4  C)-100.8  F (38.2  C)] 100.4  F (38  C)  Heart Rate:  [107-140] 123  Resp:  [8-29] 20  MAP:  [76 mmHg-104 mmHg] 88 mmHg  Arterial Line BP: (112-192)/(56-82) 125/68  FiO2 (%):  [40 %] 40 %  SpO2:  [91 %-100 %] 96 %  Wt Readings from Last 2 Encounters:   05/25/20 95.6 kg (210 lb 12.2 oz)     I/O last 3 completed shifts:  In: 1170.14 [I.V.:780.14; NG/GT:390]  Out: 2775 [Urine:2625; Emesis/NG output:150]      GENERAL APPEARANCE: Intubated, sedated. NAD.  HEENT: No icterus, PERRL 2 mm, ETT in place, OG tube in place  CARDIOVASCULAR: sinus tachycardia, normal S1 and S2, no S3 or S4 and no murmur, click or rub. Normal PMI. Pulses dopplerable.  RESP: Coarse bilaterally. Mechanical ventilation.    GASTRO: Soft, bowel sounds hypoactive but present  GENITOURINARY: Silva in place.  EXTREMITIES: Warm, +1 " edema, pulses dopplered.  NEURO: Sedated and intubated, Pupils equal and reactive, 2 mm. Fentanyl and ketamine for sedation.  INTEGUMENTARY: No rashes. Line sites CDI  LINES/TUBES/DRAINS: R radial Arterial line. ETT. OG. Silva Catheter.          Data:     Vent Settings:  Resp: 20 SpO2: 96 % O2 Device: Mechanical Ventilator      Arterial Blood Gas:   Recent Labs   Lab 05/25/20 0448 05/24/20  0401 05/23/20  1539 05/23/20  0835   PH 7.45 7.41 7.41 7.41   PCO2 32* 33* 34* 33*   PO2 94 146* 178* 85   HCO3 23 21 22 21   O2PER 40.0 40.0 50 50       Vitals:    05/23/20 0400 05/24/20 0600 05/25/20 0400   Weight: 104.1 kg (229 lb 8 oz) 99.2 kg (218 lb 11.1 oz) 95.6 kg (210 lb 12.2 oz)   I/O last 3 completed shifts:  In: 1170.14 [I.V.:780.14; NG/GT:390]  Out: 2775 [Urine:2625; Emesis/NG output:150]  Recent Labs   Lab 05/25/20 0448 05/24/20  1600   * 153*   POTASSIUM 3.6 3.7   CHLORIDE 123* 123*   CO2 23 21   ANIONGAP 8 9   * 107*   BUN 38* 35*   CR 1.09 1.04   TEN 8.6 8.5     No components found for: URINE   Recent Labs   Lab 05/25/20 0448 05/24/20 1600 05/24/20  0419   * 152* 176*   * 275* 290*   BILITOTAL 1.3 1.3 1.5*   ALBUMIN 2.8* 2.8* 3.0*   PROTTOTAL 7.1 7.0 7.0   ALKPHOS 105 102 96     Temp: 100.4  F (38  C) Temp src: EsophagealTemp  Min: 99.3  F (37.4  C)  Max: 100.8  F (38.2  C)   Recent Labs   Lab 05/25/20 0448 05/24/20  1600 05/24/20  0419 05/23/20  1537 05/23/20  0310   WBC 15.2* 14.0* 12.9* 12.4* 13.3*   HGB 8.0* 7.7* 7.4* 7.5* 7.4*   HCT 26.1* 25.1* 24.4* 24.1* 23.3*    99 100 98 96   RDW 15.6* 15.4* 15.0 14.9 14.4    214 189 171 134*     Recent Labs   Lab 05/21/20  0437 05/20/20  0356 05/19/20  2205 05/19/20  1550 05/19/20  0952 05/19/20  0415   INR 1.19* 1.29* 1.24* 1.25* 1.29* 1.40*   PTT  --  30 36 69* 69* 71*     Recent Labs   Lab 05/25/20  0448 05/24/20  1600 05/24/20  0419 05/23/20  1537 05/23/20  0310   * 107* 100* 104* 98       All imaging personally  reviewed:  Recent Results (from the past 24 hour(s))   Echocardiogram Complete    Narrative    [id number]  [id number]  [id number]  [id number]^GRIS^YOSHI           [facility],[place]  Echocardiography Laboratory  [address] [place] [id number]     Name: FAINA FORRESTER  MRN: [id number]  : 1980  Study Date: 2020 08:16 AM  Age: 39 yrs  Gender: Male  Patient Location: [facility]  Reason For Study: Cardiac Arrest  Ordering Physician: YOSHI LANDRY  Performed By: Denisse Johnson RDCS     BSA: 2.1 m2  Height: 66 in  Weight: 231 lb  BP: 132/58 mmHg  _____________________________________________________________________________  __        Procedure  Complete Portable Echo Adult. Technically difficult study.Extremely poor  acoustic windows.  _____________________________________________________________________________  __        Interpretation Summary  VA ECMO at 3.7L/min. Technically difficult study. Extremely poor acoustic  windows.  Severely (EF <30%) reduced left ventricular function on extremely limited  views.  The right ventricle cannot be assessed.  No pericardial effusion is present.  Previous study not available for comparison.  _____________________________________________________________________________  __        Left Ventricle  Left ventricular wall thickness cannot evaluate. Left ventricular size is  normal. Severely (EF <30%) reduced left ventricular function is present. Left  ventricular diastolic function is not assessable. Severe diffuse hypokinesis  is present.     Right Ventricle  The right ventricle cannot be assessed. Right ventricular function cannot be  assessed due to poor image quality.     Mitral Valve  The mitral valve cannot be assessed.        Aortic Valve  The aortic valve opens with each cardiac cycle. On Doppler interrogation,  there is no significant stenosis or regurgitation.     Tricuspid Valve  The tricuspid valve cannot be assessed. Pulmonary artery  systolic pressure  cannot be assessed.     Pulmonic Valve  The pulmonic valve cannot be assessed.     Vessels  The aorta root cannot be assessed. The thoracic aorta cannot be assessed.  Venous ECMO cannula is visualized traversing the IVC.     Pericardium  No pericardial effusion is present.     Compared to Previous Study  Previous study not available for comparison.     _____________________________________________________________________________  __                       Report approved by: Ashlee Izquierdo 05/18/2020 09:18 AM                 _____________________________________________________________________________  __                Medications     Current Facility-Administered Medications   Medication     0.9% sodium chloride BOLUS     0.9% sodium chloride BOLUS     acetaminophen (TYLENOL) tablet 650 mg    Or     acetaminophen (TYLENOL) Suppository 650 mg     acetylcysteine (MUCOMYST) 10 % nebulizer solution 4 mL     albuterol (PROVENTIL) neb solution 2.5 mg     artificial tears ophthalmic ointment     aspirin (ASA) chewable tablet 81 mg     calcium chloride in  mL intermittent infusion 1 g     calcium chloride injection 1 g     dextrose 10% infusion     glucose gel 15-30 g    Or     dextrose 50 % injection 25-50 mL    Or     glucagon injection 1 mg     fentaNYL (SUBLIMAZE) bolus from infusion pump-ADULT 50 mcg     fentaNYL (SUBLIMAZE) infusion     heparin ANTICOAGULANT injection 5,000 Units     heparin lock flush 10 UNIT/ML injection 2-5 mL     heparin lock flush 10 UNIT/ML injection 5-10 mL     heparin lock flush 10 UNIT/ML injection 5-10 mL     ipratropium - albuterol 0.5 mg/2.5 mg/3 mL (DUONEB) neb solution 3 mL     ketamine (KETALAR) 25 mg/mL in sodium chloride 0.9 % 50 mL SEDATION infusion     levETIRAcetam (KEPPRA) intermittent infusion 1,000 mg     lidocaine (LMX4) cream     lidocaine (LMX4) cream     lidocaine (LMX4) cream     lidocaine 1 % 0.1-1 mL     lidocaine 1 % 0.1-1 mL      lidocaine 1 % 0.1-1 mL     lidocaine 1% with EPINEPHrine 1:100,000 30 mL, bupivacaine (MARCAINE) 30 mL     magnesium sulfate 2 g in water intermittent infusion     magnesium sulfate 4 g in 100 mL sterile water (premade)     meropenem (MERREM) 1 g vial to attach to  mL bag     midazolam (VERSED) drip - ADULT 100 mg/100 mL in NS PRE-MIX     multivitamins w/minerals (CERTAVITE) liquid 15 mL     naloxone (NARCAN) injection 0.1-0.4 mg     pantoprazole (PROTONIX) 2 mg/mL suspension 40 mg     potassium chloride (KLOR-CON) Packet 20-40 mEq     potassium chloride 10 mEq in 100 mL intermittent infusion with 10 mg lidocaine     potassium chloride 10 mEq in 100 mL sterile water intermittent infusion (premix)     potassium chloride 20 mEq in 50 mL intermittent infusion     potassium chloride ER (KLOR-CON M) CR tablet 20-40 mEq     senna-docusate (SENOKOT-S/PERICOLACE) 8.6-50 MG per tablet 1 tablet     sodium chloride (PF) 0.9% PF flush 10-20 mL     sodium chloride (PF) 0.9% PF flush 3 mL     sodium chloride (PF) 0.9% PF flush 3 mL     sodium chloride (PF) 0.9% PF flush 5-50 mL     sodium phosphate 10 mmol in D5W intermittent infusion     sodium phosphate 15 mmol in D5W intermittent infusion     sodium phosphate 20 mmol in D5W intermittent infusion     sodium phosphate 25 mmol in D5W intermittent infusion     ticagrelor (BRILINTA) tablet 90 mg

## 2020-05-25 NOTE — PROGRESS NOTES
[facility]    Medicine Progress Note - Pulmonary Critical Care       Date of Admission:  5/17/2020  Assessment & Plan   Scot Bishop is a 39 year old male admitted on 5/17/2020 with out of hospital cardiac arrest requiring VA-ECMO support decanulated 5/19 with transient high peak airway pressures 5/20.     1. Acute hypoxic respiratory failure  2. High Peak inspiratory pressures  - improved  3. Acinetobacter (not baumannii) pneumonia  4. COVID negative  Patient demonstrates improved vent synchrony and oxygenation.  Patient sedated on fentanyl and ketamine.  Vent settings improved with FiO2 40/ PEEP 5.  Would start daily sedation holidays and PST.  He may be limited from a neurologic standpoint but will see how he does when sedation has lightened.  Will continue mucomyst for now.  Patient febrile but on meropenem which should cover acinetobacter grown on 5/22 culture and MSSA grown on 5/20.  May require ID consult pending clinical course.  No B lines on bedside pulmonary US suggesting absence of pulmonary edema.  - Daily sedation holidays coupled with pressure support trials  - Meropenem   - Mucomyst Nebs     5. Refractory VF arrest  s/p Targeted temp management and VA-ECMO  6. S/p NAZIA to proximal-mid LAD  7. Sinus tachycardia   8. Shock liver  9. GIB  10.  Hypernatremia  11.  LOWELL  Management per primary     The patient's care was discussed with the Attending Physician, Dr. Castelan.     Chavez Diehl MD  [facility], [place]  Pager: [phone]  Please see sticky note for cross cover information__________________________    Interval History   No acute events overnight.  Patient continues to have coughing events but does not significantly desat.  Patient has been febrile overnight.    Data reviewed today: I reviewed all medications, new labs and imaging results over the last 24 hours.    I personally reviewed the chest x-ray image(s) showing .  CXR  5/24/2020  IMPRESSION:  1. Endotracheal tube tip projects over the mid to upper thoracic  trachea.  2. Unchanged perihilar opacities. Favor to be edema.  3. Left lower lobe atelectasis.      Physical Exam   Vital Signs: Temp: 99.7  F (37.6  C) Temp src: Esophageal     Heart Rate: 121 Resp: 19 SpO2: 98 % O2 Device: Mechanical Ventilator    Weight: 210 lbs 12.16 oz    GEN: intubated and sedated  CV:  RRR no murmurs or extra heart sounds appreciated  Pulm:  Adequate air movement, diffuse mild coarse breath sounds, synchronous with ventilator.  No wheezes, rales or rhonchi appreciated.  GI: non distended soft   : Silva in place    Bedside Pulmonary US:  No significant B lines appreciated    Data   Recent Labs   Lab 05/25/20  0448 05/24/20  1600 05/24/20  0419  05/21/20  0437  05/20/20  0356  05/19/20  2205   WBC 15.2* 14.0* 12.9*   < > 12.9*   < > 16.9*  --  16.3*   HGB 8.0* 7.7* 7.4*   < > 7.5*   < > 7.1*  --  8.1*    99 100   < > 93   < > 93  --  93    214 189   < > 126*   < > 132*  --  139*   INR  --   --   --   --  1.19*  --  1.29*  --  1.24*   * 153* 151*   < > 146*   < > 144  --  143   POTASSIUM 3.6 3.7 3.9   < > 4.1   < > 4.4  --  4.0   CHLORIDE 123* 123* 123*   < > 116*   < > 114*  --  113*   CO2 23 21 22   < > 26   < > 23  --  24   BUN 38* 35* 38*   < > 39*   < > 36*  --  29   CR 1.09 1.04 1.20   < > 1.32*   < > 1.93*  --  1.81*   ANIONGAP 8 9 6   < > 4   < > 7  --  6   TEN 8.6 8.5 8.6   < > 8.1*   < > 7.6*  --  7.6*   * 107* 100*   < > 134*   < > 130*  130*  --  141*  135*   ALBUMIN 2.8* 2.8* 3.0*   < >  --   --  2.6*  --  2.9*   PROTTOTAL 7.1 7.0 7.0   < >  --   --  5.5*  --  5.7*   BILITOTAL 1.3 1.3 1.5*   < >  --   --  0.6  --  0.6   ALKPHOS 105 102 96   < >  --   --  44  --  45   * 275* 290*   < >  --   --  461*  --  525*   * 152* 176*   < >  --   --  382*  --  442*   TROPI 2.619* 3.604* 5.746*   < > 60.432*   < > 103.692*   < >  --     < > = values in this  interval not displayed.     ABG (5/25 04:48): pH 7.45 / 32 / 94

## 2020-05-25 NOTE — PLAN OF CARE
Patient PERRLA. Coughing fits continue, ketamine increased to 25. Moves all extremities when stimulated. Does not follow commands. Temp 38.2c overnight, tylenol given x1. Sedated with fentanyl at 125 and ketamine at 25. 's while resting, 140-150's with stimulation. BP stable, hypertensive with coughing fits. LS coarse/dim with small amounts of white/tan secretions from ETT and bloody/thin oral secretions. Cmv 40% tv 500 PEEP 5 RR 20. Sodium continues to increase, giving 60cc flushes Q4. Needs NJ placed and TF restarted. 1 BM overnight, loose/brown. UO /hr.     Problem: Adult Inpatient Plan of Care  Goal: Plan of Care Review  Outcome: No Change  Goal: Patient-Specific Goal (Individualization)  Outcome: No Change  Goal: Absence of Hospital-Acquired Illness or Injury  Outcome: No Change  Goal: Optimal Comfort and Wellbeing  Outcome: No Change  Goal: Readiness for Transition of Care  Outcome: No Change  Goal: Rounds/Family Conference  Outcome: No Change     Problem: Adjustment to Therapy (Extracorporeal Life Support/ECMO)  Goal: Optimal Adjustment to Therapy  Outcome: No Change     Problem: Bleeding (Extracorporeal Life Support/ECMO)  Goal: Absence of Bleeding  Outcome: No Change

## 2020-05-25 NOTE — PLAN OF CARE
Major Shift Events:   Continues to have R upward gaze & intermittent rigid neck favoring R side. Incessant coughing. -150s. BP hypertensive this afternoon 120-130/100, CSI aware. 1x BM. 1x small emesis after coughing. Free water flushes given for high Na. Lytes replaced. 40mg lasix given. See MAR for gtts.  Plan: Plan to start Precedex and wean ketamine as tolerated. Head CT today. Monitor neuro status. PST.  For vital signs and complete assessments, please see documentation flowsheets.

## 2020-05-25 NOTE — PROGRESS NOTES
Preliminary Video EEG impression on May 24-25, 2020  Until 6am     PATIENT INFORMATION:   39-year-old male who presented with cardiac arrest.  Patient underwent hypothermia treatment and is now rewarmed with lower doses of anesthetic medications.  The EEG is being done to evaluate for seizures. Temp  F.      MEDICATIONS:   Fentanyl 125 mcg/hr  Ketamine 12 mg/hr  Versed 2mg/hr and stopped 10:00am on 5/24/2020    TECHNICAL SUMMARY: This video EEG monitoring procedure was performed with 23 scalp electrodes in 10-20 system placements, and additional scalp, precordial and other surface electrodes used for electrical referencing and artifact detection. Video was reviewed intermittently by EEG technologist and physician for electroclinical seizures.     EEG FINDINGS: EEG has low voltage generalized delta and theta activity, electrocerebral potentials are less than 25  V, frequency is 4 to 6 Hz.  Maximum involvement of delta theta burst is noted in the parasagittal and midline electrodes.  The delta theta burst are noted in approximately 50% of the record.  There are rare segments of higher voltage delta slowing noted in less than 5% of the record, this activity is up to 50  V.  The remainder of the record has EEG attenuation with electrocerebral potentials being less than 5  V.  EEG attenuation is noted in approximately 50% of the record and may last up to 8 seconds in duration.  There is no parieto-occipital rhythm, no sleep architecture, no sleep-wake distinction.    ACTIVATION PROCEDURES: Patient was given auditory and sensory stimuli with the EEG being reactive.  During activation there were less periods of EEG attenuation and more theta activity.    EPILEPTIFORM DISCHARGES: None.  Low voltage sharp waves were noted in right parietal region at P4, right central C4, and left central C3 regions.  These discharges were not clearly epileptiform discharges however remained monitored them.  There is superimposed EKG  artifact noted throughout the record.    ICTAL: None    IMPRESSION: EEG is abnormal due to the presence of diffuse generalized low voltage delta theta slowing and attenuation consistent with a severe encephalopathy.  No clear electrographic seizures or epileptiform discharges were noted on this day despite lowering ketamine and stopping medazepam.  Low voltage sharp waves are intermittently noted in the right parietal, right central, left central regions which were not thought to be epileptiform discharges, rather part of the background activity.  Clinical correlation is advised.    Antonella Franklin MD   EPILEPSY STAFF

## 2020-05-26 ENCOUNTER — APPOINTMENT (OUTPATIENT)
Dept: GENERAL RADIOLOGY | Facility: CLINIC | Age: 40
End: 2020-05-26
Attending: NURSE PRACTITIONER
Payer: MEDICAID

## 2020-05-26 ENCOUNTER — APPOINTMENT (OUTPATIENT)
Dept: MRI IMAGING | Facility: CLINIC | Age: 40
End: 2020-05-26
Attending: NURSE PRACTITIONER
Payer: MEDICAID

## 2020-05-26 ENCOUNTER — APPOINTMENT (OUTPATIENT)
Dept: NEUROLOGY | Facility: CLINIC | Age: 40
End: 2020-05-26
Attending: PSYCHIATRY & NEUROLOGY
Payer: MEDICAID

## 2020-05-26 ENCOUNTER — APPOINTMENT (OUTPATIENT)
Dept: CARDIOLOGY | Facility: CLINIC | Age: 40
End: 2020-05-26
Attending: NURSE PRACTITIONER
Payer: MEDICAID

## 2020-05-26 LAB
ALBUMIN SERPL-MCNC: 2.8 G/DL (ref 3.4–5)
ALBUMIN SERPL-MCNC: 2.9 G/DL (ref 3.4–5)
ALP SERPL-CCNC: 102 U/L (ref 40–150)
ALP SERPL-CCNC: 113 U/L (ref 40–150)
ALT SERPL W P-5'-P-CCNC: 241 U/L (ref 0–70)
ALT SERPL W P-5'-P-CCNC: 252 U/L (ref 0–70)
ANION GAP SERPL CALCULATED.3IONS-SCNC: 6 MMOL/L (ref 3–14)
ANION GAP SERPL CALCULATED.3IONS-SCNC: 6 MMOL/L (ref 3–14)
AST SERPL W P-5'-P-CCNC: 110 U/L (ref 0–45)
AST SERPL W P-5'-P-CCNC: 119 U/L (ref 0–45)
BACTERIA SPEC CULT: NO GROWTH
BASE EXCESS BLDA CALC-SCNC: 0.8 MMOL/L
BILIRUB SERPL-MCNC: 1.1 MG/DL (ref 0.2–1.3)
BILIRUB SERPL-MCNC: 1.1 MG/DL (ref 0.2–1.3)
BUN SERPL-MCNC: 36 MG/DL (ref 7–30)
BUN SERPL-MCNC: 42 MG/DL (ref 7–30)
CALCIUM SERPL-MCNC: 8.6 MG/DL (ref 8.5–10.1)
CALCIUM SERPL-MCNC: 8.8 MG/DL (ref 8.5–10.1)
CHLORIDE SERPL-SCNC: 122 MMOL/L (ref 94–109)
CHLORIDE SERPL-SCNC: 123 MMOL/L (ref 94–109)
CO2 SERPL-SCNC: 23 MMOL/L (ref 20–32)
CO2 SERPL-SCNC: 24 MMOL/L (ref 20–32)
CREAT SERPL-MCNC: 1.03 MG/DL (ref 0.66–1.25)
CREAT SERPL-MCNC: 1.08 MG/DL (ref 0.66–1.25)
ERYTHROCYTE [DISTWIDTH] IN BLOOD BY AUTOMATED COUNT: 15.9 % (ref 10–15)
ERYTHROCYTE [DISTWIDTH] IN BLOOD BY AUTOMATED COUNT: 16.1 % (ref 10–15)
GFR SERPL CREATININE-BSD FRML MDRD: 85 ML/MIN/{1.73_M2}
GFR SERPL CREATININE-BSD FRML MDRD: 90 ML/MIN/{1.73_M2}
GLUCOSE BLDC GLUCOMTR-MCNC: 101 MG/DL (ref 70–99)
GLUCOSE SERPL-MCNC: 102 MG/DL (ref 70–99)
GLUCOSE SERPL-MCNC: 102 MG/DL (ref 70–99)
HCO3 BLD-SCNC: 24 MMOL/L (ref 21–28)
HCT VFR BLD AUTO: 25.1 % (ref 40–53)
HCT VFR BLD AUTO: 28.1 % (ref 40–53)
HGB BLD-MCNC: 7.7 G/DL (ref 13.3–17.7)
HGB BLD-MCNC: 8.5 G/DL (ref 13.3–17.7)
INTERPRETATION ECG - MUSE: NORMAL
LACTATE BLD-SCNC: 1.1 MMOL/L (ref 0.7–2)
MAGNESIUM SERPL-MCNC: 2.4 MG/DL (ref 1.6–2.3)
MAGNESIUM SERPL-MCNC: 2.5 MG/DL (ref 1.6–2.3)
MAGNESIUM SERPL-MCNC: 2.5 MG/DL (ref 1.6–2.3)
MCH RBC QN AUTO: 30.4 PG (ref 26.5–33)
MCH RBC QN AUTO: 31 PG (ref 26.5–33)
MCHC RBC AUTO-ENTMCNC: 30.2 G/DL (ref 31.5–36.5)
MCHC RBC AUTO-ENTMCNC: 30.7 G/DL (ref 31.5–36.5)
MCV RBC AUTO: 100 FL (ref 78–100)
MCV RBC AUTO: 101 FL (ref 78–100)
O2/TOTAL GAS SETTING VFR VENT: 40 %
OXYHGB MFR BLD: 97 % (ref 92–100)
PCO2 BLD: 33 MM HG (ref 35–45)
PH BLD: 7.47 PH (ref 7.35–7.45)
PHOSPHATE SERPL-MCNC: 3 MG/DL (ref 2.5–4.5)
PHOSPHATE SERPL-MCNC: 3.8 MG/DL (ref 2.5–4.5)
PLATELET # BLD AUTO: 283 10E9/L (ref 150–450)
PLATELET # BLD AUTO: 339 10E9/L (ref 150–450)
PO2 BLD: 125 MM HG (ref 80–105)
POTASSIUM SERPL-SCNC: 3.3 MMOL/L (ref 3.4–5.3)
POTASSIUM SERPL-SCNC: 3.6 MMOL/L (ref 3.4–5.3)
POTASSIUM SERPL-SCNC: 3.8 MMOL/L (ref 3.4–5.3)
PROT SERPL-MCNC: 7 G/DL (ref 6.8–8.8)
PROT SERPL-MCNC: 7.5 G/DL (ref 6.8–8.8)
RBC # BLD AUTO: 2.48 10E12/L (ref 4.4–5.9)
RBC # BLD AUTO: 2.8 10E12/L (ref 4.4–5.9)
SODIUM SERPL-SCNC: 152 MMOL/L (ref 133–144)
SODIUM SERPL-SCNC: 152 MMOL/L (ref 133–144)
SPECIMEN SOURCE: NORMAL
TROPONIN I SERPL-MCNC: 1.52 UG/L (ref 0–0.04)
WBC # BLD AUTO: 14.9 10E9/L (ref 4–11)
WBC # BLD AUTO: 16.6 10E9/L (ref 4–11)

## 2020-05-26 PROCEDURE — 25000125 ZZHC RX 250: Performed by: NURSE PRACTITIONER

## 2020-05-26 PROCEDURE — 83735 ASSAY OF MAGNESIUM: CPT | Performed by: NURSE PRACTITIONER

## 2020-05-26 PROCEDURE — 93010 ELECTROCARDIOGRAM REPORT: CPT | Performed by: INTERNAL MEDICINE

## 2020-05-26 PROCEDURE — 00000146 ZZHCL STATISTIC GLUCOSE BY METER IP

## 2020-05-26 PROCEDURE — 95711 VEEG 2-12 HR UNMONITORED: CPT

## 2020-05-26 PROCEDURE — 84132 ASSAY OF SERUM POTASSIUM: CPT | Performed by: INTERNAL MEDICINE

## 2020-05-26 PROCEDURE — 94640 AIRWAY INHALATION TREATMENT: CPT | Mod: 76

## 2020-05-26 PROCEDURE — 83735 ASSAY OF MAGNESIUM: CPT | Performed by: INTERNAL MEDICINE

## 2020-05-26 PROCEDURE — 20000004 ZZH R&B ICU UMMC

## 2020-05-26 PROCEDURE — 25000132 ZZH RX MED GY IP 250 OP 250 PS 637: Performed by: NURSE PRACTITIONER

## 2020-05-26 PROCEDURE — 82805 BLOOD GASES W/O2 SATURATION: CPT | Performed by: NURSE PRACTITIONER

## 2020-05-26 PROCEDURE — 40000014 ZZH STATISTIC ARTERIAL MONITORING DAILY

## 2020-05-26 PROCEDURE — 99207 ZZC APP CREDIT; MD BILLING SHARED VISIT: CPT | Performed by: NURSE PRACTITIONER

## 2020-05-26 PROCEDURE — 70551 MRI BRAIN STEM W/O DYE: CPT

## 2020-05-26 PROCEDURE — 40000276 ZZH STATISTIC RCP TIME ED VENT EA 10 MIN

## 2020-05-26 PROCEDURE — 84100 ASSAY OF PHOSPHORUS: CPT | Performed by: NURSE PRACTITIONER

## 2020-05-26 PROCEDURE — 27210437 ZZH NUTRITION PRODUCT SEMIELEM INTERMED LITER

## 2020-05-26 PROCEDURE — 25500064 ZZH RX 255 OP 636: Performed by: INTERNAL MEDICINE

## 2020-05-26 PROCEDURE — 25000132 ZZH RX MED GY IP 250 OP 250 PS 637: Performed by: INTERNAL MEDICINE

## 2020-05-26 PROCEDURE — 94003 VENT MGMT INPAT SUBQ DAY: CPT

## 2020-05-26 PROCEDURE — 93005 ELECTROCARDIOGRAM TRACING: CPT

## 2020-05-26 PROCEDURE — 25800030 ZZH RX IP 258 OP 636: Performed by: NURSE PRACTITIONER

## 2020-05-26 PROCEDURE — 25000128 H RX IP 250 OP 636: Performed by: NURSE PRACTITIONER

## 2020-05-26 PROCEDURE — 99291 CRITICAL CARE FIRST HOUR: CPT | Performed by: INTERNAL MEDICINE

## 2020-05-26 PROCEDURE — 25000132 ZZH RX MED GY IP 250 OP 250 PS 637: Performed by: STUDENT IN AN ORGANIZED HEALTH CARE EDUCATION/TRAINING PROGRAM

## 2020-05-26 PROCEDURE — 85027 COMPLETE CBC AUTOMATED: CPT | Performed by: NURSE PRACTITIONER

## 2020-05-26 PROCEDURE — 40000275 ZZH STATISTIC RCP TIME EA 10 MIN

## 2020-05-26 PROCEDURE — 80053 COMPREHEN METABOLIC PANEL: CPT | Performed by: NURSE PRACTITIONER

## 2020-05-26 PROCEDURE — 93308 TTE F-UP OR LMTD: CPT | Mod: 26 | Performed by: INTERNAL MEDICINE

## 2020-05-26 PROCEDURE — 44500 INTRO GASTROINTESTINAL TUBE: CPT | Performed by: DIETITIAN, REGISTERED

## 2020-05-26 PROCEDURE — 25000128 H RX IP 250 OP 636

## 2020-05-26 PROCEDURE — 93321 DOPPLER ECHO F-UP/LMTD STD: CPT | Mod: 26 | Performed by: INTERNAL MEDICINE

## 2020-05-26 PROCEDURE — 25000132 ZZH RX MED GY IP 250 OP 250 PS 637

## 2020-05-26 PROCEDURE — 40000986 XR ABDOMEN PORT 1 VW

## 2020-05-26 PROCEDURE — 83605 ASSAY OF LACTIC ACID: CPT | Performed by: NURSE PRACTITIONER

## 2020-05-26 PROCEDURE — 84484 ASSAY OF TROPONIN QUANT: CPT | Performed by: NURSE PRACTITIONER

## 2020-05-26 PROCEDURE — 25000128 H RX IP 250 OP 636: Performed by: STUDENT IN AN ORGANIZED HEALTH CARE EDUCATION/TRAINING PROGRAM

## 2020-05-26 PROCEDURE — 93325 DOPPLER ECHO COLOR FLOW MAPG: CPT | Mod: 26 | Performed by: INTERNAL MEDICINE

## 2020-05-26 PROCEDURE — 25000125 ZZHC RX 250

## 2020-05-26 RX ORDER — LIDOCAINE HYDROCHLORIDE 20 MG/ML
SOLUTION OROPHARYNGEAL
Status: COMPLETED
Start: 2020-05-26 | End: 2020-05-26

## 2020-05-26 RX ORDER — FENTANYL CITRATE 50 UG/ML
INJECTION, SOLUTION INTRAMUSCULAR; INTRAVENOUS
Status: COMPLETED
Start: 2020-05-26 | End: 2020-05-26

## 2020-05-26 RX ORDER — FENTANYL CITRATE 50 UG/ML
100 INJECTION, SOLUTION INTRAMUSCULAR; INTRAVENOUS ONCE
Status: COMPLETED | OUTPATIENT
Start: 2020-05-26 | End: 2020-05-26

## 2020-05-26 RX ORDER — SODIUM CHLORIDE FOR INHALATION 3 %
3 VIAL, NEBULIZER (ML) INHALATION
Status: DISCONTINUED | OUTPATIENT
Start: 2020-05-26 | End: 2020-06-25 | Stop reason: HOSPADM

## 2020-05-26 RX ORDER — LEVALBUTEROL INHALATION SOLUTION 1.25 MG/3ML
1.25 SOLUTION RESPIRATORY (INHALATION) EVERY 4 HOURS
Status: DISCONTINUED | OUTPATIENT
Start: 2020-05-26 | End: 2020-06-01

## 2020-05-26 RX ORDER — ALBUTEROL SULFATE 0.83 MG/ML
2.5 SOLUTION RESPIRATORY (INHALATION) EVERY 4 HOURS PRN
Status: DISCONTINUED | OUTPATIENT
Start: 2020-05-26 | End: 2020-06-06

## 2020-05-26 RX ORDER — FUROSEMIDE 10 MG/ML
40 INJECTION INTRAMUSCULAR; INTRAVENOUS ONCE
Status: COMPLETED | OUTPATIENT
Start: 2020-05-26 | End: 2020-05-26

## 2020-05-26 RX ADMIN — ALBUTEROL SULFATE 2.5 MG: 2.5 SOLUTION RESPIRATORY (INHALATION) at 13:12

## 2020-05-26 RX ADMIN — TICAGRELOR 90 MG: 90 TABLET ORAL at 20:56

## 2020-05-26 RX ADMIN — ASPIRIN 81 MG CHEWABLE TABLET 81 MG: 81 TABLET CHEWABLE at 07:51

## 2020-05-26 RX ADMIN — TICAGRELOR 90 MG: 90 TABLET ORAL at 07:51

## 2020-05-26 RX ADMIN — Medication 40 MG: at 20:55

## 2020-05-26 RX ADMIN — ACETYLCYSTEINE 4 ML: 100 SOLUTION ORAL; RESPIRATORY (INHALATION) at 04:04

## 2020-05-26 RX ADMIN — FENTANYL CITRATE 100 MCG: 50 INJECTION INTRAMUSCULAR; INTRAVENOUS at 17:44

## 2020-05-26 RX ADMIN — FUROSEMIDE 40 MG: 10 INJECTION, SOLUTION INTRAMUSCULAR; INTRAVENOUS at 07:53

## 2020-05-26 RX ADMIN — QUETIAPINE FUMARATE 25 MG: 25 TABLET ORAL at 07:51

## 2020-05-26 RX ADMIN — LEVETIRACETAM 1000 MG: 10 INJECTION INTRAVENOUS at 22:51

## 2020-05-26 RX ADMIN — FENTANYL CITRATE 100 MCG: 50 INJECTION, SOLUTION INTRAMUSCULAR; INTRAVENOUS at 17:44

## 2020-05-26 RX ADMIN — LEVETIRACETAM 1000 MG: 10 INJECTION INTRAVENOUS at 12:35

## 2020-05-26 RX ADMIN — ACETYLCYSTEINE 4 ML: 100 SOLUTION ORAL; RESPIRATORY (INHALATION) at 13:12

## 2020-05-26 RX ADMIN — DEXMEDETOMIDINE 0.2 MCG/KG/HR: 100 INJECTION, SOLUTION, CONCENTRATE INTRAVENOUS at 05:14

## 2020-05-26 RX ADMIN — HEPARIN SODIUM 5000 UNITS: 5000 INJECTION, SOLUTION INTRAVENOUS; SUBCUTANEOUS at 05:14

## 2020-05-26 RX ADMIN — MEROPENEM 1 G: 1 INJECTION, POWDER, FOR SOLUTION INTRAVENOUS at 17:37

## 2020-05-26 RX ADMIN — Medication 50 MCG: at 14:39

## 2020-05-26 RX ADMIN — ALBUTEROL SULFATE 2.5 MG: 2.5 SOLUTION RESPIRATORY (INHALATION) at 09:05

## 2020-05-26 RX ADMIN — HEPARIN SODIUM 5000 UNITS: 5000 INJECTION, SOLUTION INTRAVENOUS; SUBCUTANEOUS at 14:41

## 2020-05-26 RX ADMIN — MEROPENEM 1 G: 1 INJECTION, POWDER, FOR SOLUTION INTRAVENOUS at 07:51

## 2020-05-26 RX ADMIN — POTASSIUM CHLORIDE 20 MEQ: 1.5 POWDER, FOR SOLUTION ORAL at 05:14

## 2020-05-26 RX ADMIN — LIDOCAINE HYDROCHLORIDE 15 ML: 20 SOLUTION ORAL; TOPICAL at 12:35

## 2020-05-26 RX ADMIN — HEPARIN SODIUM 5000 UNITS: 5000 INJECTION, SOLUTION INTRAVENOUS; SUBCUTANEOUS at 22:46

## 2020-05-26 RX ADMIN — ALBUTEROL SULFATE 2.5 MG: 2.5 SOLUTION RESPIRATORY (INHALATION) at 04:04

## 2020-05-26 RX ADMIN — POTASSIUM CHLORIDE 20 MEQ: 1.5 POWDER, FOR SOLUTION ORAL at 22:46

## 2020-05-26 RX ADMIN — ACETAMINOPHEN 650 MG: 325 TABLET ORAL at 07:51

## 2020-05-26 RX ADMIN — ACETYLCYSTEINE 4 ML: 100 SOLUTION ORAL; RESPIRATORY (INHALATION) at 09:05

## 2020-05-26 RX ADMIN — POTASSIUM CHLORIDE 20 MEQ: 29.8 INJECTION, SOLUTION INTRAVENOUS at 15:37

## 2020-05-26 RX ADMIN — MULTIVITAMIN 15 ML: LIQUID ORAL at 07:51

## 2020-05-26 RX ADMIN — QUETIAPINE FUMARATE 50 MG: 25 TABLET ORAL at 20:55

## 2020-05-26 RX ADMIN — Medication 40 MG: at 07:52

## 2020-05-26 RX ADMIN — MEROPENEM 1 G: 1 INJECTION, POWDER, FOR SOLUTION INTRAVENOUS at 00:28

## 2020-05-26 RX ADMIN — ACETYLCYSTEINE 4 ML: 100 SOLUTION ORAL; RESPIRATORY (INHALATION) at 01:09

## 2020-05-26 RX ADMIN — HUMAN ALBUMIN MICROSPHERES AND PERFLUTREN 5 ML: 10; .22 INJECTION, SOLUTION INTRAVENOUS at 13:45

## 2020-05-26 RX ADMIN — ALBUTEROL SULFATE 2.5 MG: 2.5 SOLUTION RESPIRATORY (INHALATION) at 01:09

## 2020-05-26 RX ADMIN — POTASSIUM CHLORIDE 20 MEQ: 29.8 INJECTION, SOLUTION INTRAVENOUS at 13:29

## 2020-05-26 ASSESSMENT — ACTIVITIES OF DAILY LIVING (ADL)
ADLS_ACUITY_SCORE: 26

## 2020-05-26 ASSESSMENT — MIFFLIN-ST. JEOR: SCORE: 1800.75

## 2020-05-26 NOTE — PROGRESS NOTES
INITIAL REPORT of Video-EEG Monitoring    DATE OF RECORDIN2020    I reviewed the ongoing video-EEG monitoring of Scot Bishop.  The EEG during sedated coma was abnormal due to generalized delta-theta slowing, with occasional runs of slowly-repetitive, generalized delta waves and rarely of spikes of shifting maximum.   No electrographic seizures were observed.  These abnormalities indicate moderate-severe electrographic encephalopathy, with an elevated risk of partial-onset seizures.  German John M.D., pager [phone]

## 2020-05-26 NOTE — PLAN OF CARE
Major Shift Events:   No withdrawal to pain. Strong cough, neck rigid, & upward gaze. PST all shift 7/5 40%. Thick secretions. NJ placed, TF restarted. Lasix given x1. MRI head done.  Plan: Monitor neuro status  For vital signs and complete assessments, please see documentation flowsheets.

## 2020-05-26 NOTE — PROGRESS NOTES
[facility]    Medicine Progress Note - Pulmonary Critical Care       Date of Admission:  5/17/2020  Assessment & Plan   Scot Bihsop is a 39 year old male admitted on 5/17/2020 with out of hospital cardiac arrest requiring VA-ECMO support decanulated 5/19 with transient high peak airway pressures 5/20.  CT 5/25/2020 demonstrates loss of gray white junction suggestive of hypoxic brain injury     1. Acute hypoxic respiratory failure  2. High Peak inspiratory pressures  - improved  3. Acinetobacter (not baumannii) pneumonia  4. COVID negative  Patient continues to be synchronous with ventilator.  Able to PST (7/5) for 2 hours limited by Tachypnea and cough.  From a pulmonary standpoint patient has improved significantly; however CT demonstrates signs of anoxic injury.  Would continue daily sedation holidays and pressure support trials but hold on extubation until neuro prognostication has been completed.  Patient does have respiratory alkalosis but patient overbreathing vent.    - Daily sedation holidays coupled with pressure support trials  - Hold on extubation until neuro prognostication completed  - Meropenem   - Mucomyst Nebs     5. Suspected diffuse anoxic brain injury  6. Refractory VF arrest  s/p Targeted temp management and VA-ECMO  7. S/p NAZIA to proximal-mid LAD  8. Sinus tachycardia   9. Shock liver  10. GIB  11.  Hypernatremia  12.  LOWELL    Management per primary     The patient's care was discussed with the Attending Physician, Dr. Ochoa.     Chavez Diehl MD  [facility], [place]  Pager: [phone]  Please see sticky note for cross cover information__________________________    Interval History   No acute events overnight.  Patient able to PST for 2 hours yesterday but stopped due to cough, tachypnea.  CT Head demonstrates signs of diffuse anoxic brain injury.    Data reviewed today: I reviewed all medications, new labs and imaging results  over the last 24 hours.    CT head 5/25/2020  Impression:   1. Multifocal acute/subacute cerebral infarcts in addition to probable  diffuse loss of gray-white matter differentiation which can be seen in  diffuse hypoxic injury.  2. New mastoid effusions and paranasal sinus fluid and mucosal  hypertrophy, nonspecific in the setting of intubation.      Physical Exam   Vital Signs: Temp: 99.1  F (37.3  C) Temp src: Bladder BP: 117/78   Heart Rate: 120 Resp: 12 SpO2: 99 % O2 Device: Mechanical Ventilator    Weight: 208 lbs 15.94 oz    GEN: intubated and sedated  CV:  RRR no murmurs or extra heart sounds appreciated  Pulm:  Adequate air movement, diffuse mild coarse breath sounds, synchronous with ventilator.  No wheezes, rales or rhonchi appreciated.  GI: non distended soft   : Silva in place  Neuro: Unable to follow commands, pupils equal and normal caliber.    Bedside Pulmonary US:  No significant B lines appreciated    Data   Recent Labs   Lab 05/26/20  0345 05/25/20  1749 05/25/20  1225 05/25/20  0448  05/21/20  0437  05/20/20  0356  05/19/20  2205   WBC 14.9* 16.3*  --  15.2*   < > 12.9*   < > 16.9*  --  16.3*   HGB 7.7* 8.1*  --  8.0*   < > 7.5*   < > 7.1*  --  8.1*   * 101*  --  100   < > 93   < > 93  --  93    264  --  245   < > 126*   < > 132*  --  139*   INR  --   --   --   --   --  1.19*  --  1.29*  --  1.24*   * 153*  --  154*   < > 146*   < > 144  --  143   POTASSIUM 3.6 3.4 3.7 3.6   < > 4.1   < > 4.4  --  4.0   CHLORIDE 123* 123*  --  123*   < > 116*   < > 114*  --  113*   CO2 23 26  --  23   < > 26   < > 23  --  24   BUN 42* 38*  --  38*   < > 39*   < > 36*  --  29   CR 1.08 1.08  --  1.09   < > 1.32*   < > 1.93*  --  1.81*   ANIONGAP 6 5  --  8   < > 4   < > 7  --  6   TEN 8.6 8.4*  --  8.6   < > 8.1*   < > 7.6*  --  7.6*   * 104*  --  104*   < > 134*   < > 130*  130*  --  141*  135*   ALBUMIN 2.8* 2.9*  --  2.8*   < >  --   --  2.6*  --  2.9*   PROTTOTAL 7.0 7.4  --  7.1    < >  --   --  5.5*  --  5.7*   BILITOTAL 1.1 1.2  --  1.3   < >  --   --  0.6  --  0.6   ALKPHOS 102 110  --  105   < >  --   --  44  --  45   * 268*  --  263*   < >  --   --  461*  --  525*   * 123*  --  132*   < >  --   --  382*  --  442*   TROPI 1.521* 1.843*  --  2.619*   < > 60.432*   < > 103.692*   < >  --     < > = values in this interval not displayed.     [facility] (5/26 ): pH 7.47 / 33 / 94

## 2020-05-26 NOTE — PROGRESS NOTES
Neuroscience Intensive Care Progress Note  2020    REASON FOR CRITICAL CARE ADMISSION: Cardiac arrest     ADMITTING PHYSICIAN: CSI     Date of Service (when I saw the patient): 20    ASSESSMENT: Scot Bishop is a 39 year old male admitted on 2020 with no PMH who was admitted for cardiac arrest.    Patient had been complaining of chest pain while home but did not seek medical attention right away. While coming out of the shower he collapsed. EMS was then called and his initial rhythm was asystole, however, after starting CPR he had PEA then eventually VF. After multiple doses of epinephrine and CPR ROSC was obtained. He was intubated in the field. Upon arrival to [facility] he was taken emergently to the cath lab for intervention. While in the cath lab he arrested again and was ultimately placed on VA ECMO. After he underwent successful aspiration thrombectomy of the proximal LAD.      24 Hour Vital Signs Summary:  Temperatures:  Current - Temp: 99.7  F (37.6  C); Max - Temp  Av.9  F (37.7  C)  Min: 99.1  F (37.3  C)  Max: 100.4  F (38  C)  Respiration range: Resp  Av.1  Min: 12  Max: 21  Pulse range: No data recorded  Blood pressure range: Systolic (24hrs), Av , Min:117 , Max:129   ; Diastolic (24hrs), Av, Min:78, Max:82    Pulse oximetry range: SpO2  Av.7 %  Min: 97 %  Max: 100 %    Ventilator Settings  Ventilation Mode: CPAP/PS  (Continuous positive airway pressure with Pressure Support)  FiO2 (%): 40 %  Rate Set (breaths/minute): (S) 16 breaths/min  Tidal Volume Set (mL): 500 mL  PEEP (cm H2O): 5 cmH2O  Pressure Support (cm H2O): 7 cmH2O  Oxygen Concentration (%): 40 %  Resp: 13        Intake/Output Summary (Last 24 hours) at 2020 1326  Last data filed at 2020 1300  Gross per 24 hour   Intake 2335.98 ml   Output 3280 ml   Net -944.02 ml       Arterial Line BP: ()/(49-94) 119/77  MAP:  [63 mmHg-107 mmHg] 95 mmHg  BP - Mean:  [93-96] 93    Current Medications:     "acetylcysteine  4 mL Nebulization Q4H     albuterol  2.5 mg Nebulization Q4H     aspirin  81 mg Per Feeding Tube Daily     heparin ANTICOAGULANT  5,000 Units Subcutaneous Q8H GABRIELLE     heparin lock flush  5-10 mL Intracatheter Q24H     levETIRAcetam  1,000 mg Intravenous Q12H     meropenem  1 g Intravenous Q8H     multivitamins w/minerals  15 mL Per Feeding Tube Daily     pantoprazole  40 mg Oral or Feeding Tube BID     QUEtiapine  25 mg Oral or Feeding Tube QAM     QUEtiapine  50 mg Oral or Feeding Tube QPM     senna-docusate  1 tablet Oral or Feeding Tube BID     sodium chloride (PF)  3 mL Intracatheter Q8H     ticagrelor  90 mg Oral or Feeding Tube BID       PRN Medications:  sodium chloride 0.9%, sodium chloride 0.9%, acetaminophen **OR** acetaminophen, artificial tears, calcium chloride, calcium chloride, dextrose, glucose **OR** dextrose **OR** glucagon, fentaNYL, heparin lock flush, heparin lock flush, ipratropium - albuterol 0.5 mg/2.5 mg/3 mL, lidocaine 4%, lidocaine 4%, lidocaine 4%, lidocaine (buffered or not buffered), lidocaine (buffered or not buffered), lidocaine (buffered or not buffered), lidocaine 1% /EPI [id number] 30  mL + bupivacaine 0.25% 30 mL, magnesium sulfate, magnesium sulfate, naloxone, potassium chloride, potassium chloride with lidocaine, potassium chloride, potassium chloride, potassium chloride, sodium chloride, sodium chloride (PF), sodium chloride (PF), sodium chloride (PF), sodium phosphate, sodium phosphate, sodium phosphate, sodium phosphate    Infusions:    dexmedetomidine Stopped (05/26/20 0900)     dextrose       fentaNYL 75 mcg/hr (05/26/20 1200)     ketamine (KETALAR) 25 mg/mL ADULT SEDATION infusion Stopped (05/25/20 1701)     midazolam Stopped (05/24/20 1100)       Allergies not on file    Physical Examination:  Vitals: /78   Temp 99.7  F (37.6  C)   Resp 13   Ht 1.676 m (5' 6\")   Wt 94.8 kg (208 lb 15.9 oz)   SpO2 99%   BMI 33.73 kg/m    EXAM: Examined on Fentanyl " at 75 mg/hr  - Spontaneous eye opening, does not track staff or follow commands.  - Upward gaze  - Pupils symmetric, +2mm, and reactive bilaterally  - Corneal response intact  - No clear facial asymmetry  - Cough and gag present with deep suctioning  - No limb movements, does not withdraw to noxious stimuli x 4      Labs/Studies:  Recent Labs   Lab Test 20  1241 20  0345 20  1749  20  0448   NA  --  152* 153*  --  154*   POTASSIUM 3.3* 3.6 3.4   < > 3.6   CHLORIDE  --  123* 123*  --  123*   CO2  --  23 26  --  23   ANIONGAP  --  6 5  --  8   GLC  --  102* 104*  --  104*   BUN  --  42* 38*  --  38*   CR  --  1.08 1.08  --  1.09   TEN  --  8.6 8.4*  --  8.6   WBC  --  14.9* 16.3*  --  15.2*   RBC  --  2.48* 2.59*  --  2.61*   HGB  --  7.7* 8.1*  --  8.0*   PLT  --  283 264  --  245    < > = values in this interval not displayed.       Recent Labs   Lab Test 20  0437 20  0356 20  2205 20  1550   INR 1.19* 1.29* 1.24* 1.25*   PTT  --  30 36 69*       Recent Labs   Lab 20  0345 20  0448 20  0401 20  1539   PH 7.47* 7.45 7.41 7.41   PCO2 33* 32* 33* 34*   PO2 125* 94 146* 178*   HCO3 24 23 21 22   O2PER 40.0 40.0 40.0 50         Imagin. CT Head w/o contrast on 20 at 1642  Impression:   1. Multifocal acute/subacute cerebral infarcts in addition to probable  diffuse loss of gray-white matter differentiation which can be seen in  diffuse hypoxic injury.  2. New mastoid effusions and paranasal sinus fluid and mucosal  hypertrophy, nonspecific in the setting of intubation.    2. CT Head w/o contrast on 20 at 1557  Impression:   1. No acute intracranial pathology.       Assessment/Plan  Scot Bishop is a 39 year old admitted on 2020 with cardiac arrest s/p VA ECMO--decannulated on     Pt's eyes are open but does not track or follow commands. He remains on a small dose of Fentanyl at 75 mg/hr. His CT Head from  does show  multiple acute/subacute cerebral infarcts and diffuse loss of gray-white matter. An MRI Brain would be extremely helpful to determine what if any deficits may be present. His vEEG is consistent with severe encephalopathy and without seizures, ok to discontinue.     Plan  Neuro:  #Cardiac arrest  #High risk for hypoxic brain injury ~ Loss of gray-white matter  #Multiple acute/subacute cerebral infarcts   - Recommend MRI Brain w/o contrast     #Seizures ~ resolved  - Continue Levetiracetam 1,000 mg q12h  - Ok to stop vEEG (Will do for you)      The patient was seen and discussed with the attending, Dr. Méndez.    Sunita OCAMPO, CNP  NeuroCritical Care Nurse Practitioner  Pager: [phone]  Ascom: [phone] available M-F 0700 to 1500

## 2020-05-26 NOTE — PROGRESS NOTES
Cardiology Progress Note  Scot Bishop MRN: [id number]  Age: 39 year old, : 1980  Date: 2020            Assessment and Plan:     Scot Bishop is a 39 year old male who was admitted on 2020 for cardiac arrest. Pt reportedly had chest pain that started on 20. He was using Drano on his pipes at home and did not initially seek medical attention for CP and SOB since it said on the box that Drano can cause those symptoms. He took a shower and had syncopal episode after coming out of the shower. EMS was called; initial rhythm asystole. With chest compressions pt eventually had PEA and then eventually had VF in the field. After multiple cycles of epinephrine had ROSC. He was intubated in the field.   Pt was brought to [facility] ED where he regained a pulse and was brought to the Cath Lab for coronary angiogram. Initially, BP was 90s/60s as he was being transported from ED but he had recurrent cardiac arrest on arrival to Cath Lab. ECG showed ST elevation in aVR. He was promptly placed on VA ECMO. He had thrombotic occlusion of proximal LAD and underwent successful aspiration thrombectomy and PCI w/ NAZIA of proximal and mid LAD.     Interval events: Repeat CTH yesterday showed multifocal acute/subacute cerebral infarcts. Moving all extremities although not to command. Ketamine weaned off yesterday. Continues on fentanyl 25 mcg/hr and precedex 0.2 mcg/kg/hr. Added seroquel yesterday.    Today's plan:   -diuresis  -post-pyloric TF placement   -wean fentanyl   -PST   -PT/OT   -Neurocritical care consult for CTH findings   -repeat echo w/ contrast to r/o cardioembolic source of infarcts   -EKG to f/u QTc since starting seroquel     Neurology: Intubated, sedated. Initial CT head negative. Cooled to 34 degrees on arrival; rewarmed  AM. EEG this AM showed severe diffuse encephalopathy without seizures or epileptiform discharges.  -wean fentanyl  -versed and ketamine off    -precedex for agitation  -added seroquel yesterday     Cardiovascular / Hemodynamics: Refractory VF arrest    S/p NAZIA to proximal-mid LAD  Sinus tachycardia   Peripheral VA ECMO inserted for cardiac arrest. LA 16 initially. Suspect ongoing tachycardia d/t evolving MI and post-arrest state. ECMO decannulation at bedside on 5/19; IABP discontinued 5/20.  TTE 5/20/20: EF 45-50%, RV normal   EKG 5/25: Sinus tachycardia, QTc 429.   -repeat EKG  -continue ASA 81mg daily and ticagrelor 90mg BID   -hold statin for now given likely hepatic injury during arrest  -hold ACE/ARB for now given likely reduced renal fxn after arrest  -holding beta blocker given shock    Pulmonary: Acute hypoxic respiratory failure  COVID negative  Admission CT chest showed bilateral consolidations c/w aspiration PNA. Had thick secretions that required bagging by RT on 5/20. Bronchoscopy 5/21. Increasing oxygen requirements and pt-vent dyssynchrony on 5/22. Pulmonary re-consulted, infectious work up, pt sedated.   Vent settings this AM: 18/500/5/40%   ABG on these settings: 7.47/33/125/24   CXR 5/25: b/l opacities. Lines in stable position.   -rate to 16 for resp alkalosis   -PST BID   -continue mucomyst and albuterol nebs  -add hypertonic saline nebs per pulm   -growing GNR in sputum 5/22; continue meropenem     -wean vent as able  -daily CXR  -Q12h ABGs and PRN   -consider scheduled duonebs if signs of lung dz, currently PRN       GI and Nutrition: Shock liver  GIB, resolved    -> 241,  -> 110. T bili 1.1. Restarted TF.   -consult Nutrition for post-pyloric FT placement   -monitor BID LFTs  -continue TF   -bowel regimen   -GI Prophylaxis: Protonix BID for UGIB   Renal, Fluid and Electrolytes: Acute kidney injury   Cr improved to 1.08 this AM. 2.4L UOP and net negative ~700 mL yesterday after lasix 40 mg IV x 1. Na 152 today.   -FWF 60 mL q 1 hr   -lasix 40 mg IV this AM  -monitor I/O  -maintain K>3.8 and Mg>2    Infectious Disease:  "Aspiration pneumonia  Leukocytosis  WBC 14.9, tmax 100.2F overnight. Blood cultures negative thus far. Grew Staph aureus in sputum cx.   -vancomycin/zosyn x5 days (5/17-5/22) for asp PNA  -vancomycin discontinued   -meropenem through 5/28 for GNR in sputum    -monitor for signs of infection given lines and leukocytosis   Hematology and Oncology: Receiving ASA/ticagrelor for NAZIA. Transfused 1 unit PRBCs 5/21. Hgb 7.7 this AM from 8.1 yesterday. No signs of active bleeding.   -transfuse for Hgb<7   -DVT PPX: subcutaneous heparin    Endocrinology: No known medical history. BG elevated.  -not requiring insulin gtt  -HgbA1c 5.2   Lines:       R radial arterial line May 17, 2020  ETT May 17, 2020  Silva catheter May 17, 2020  OG tube May 17, 2020  Restraint: not currently needed    Current lines are required for patient management       Family update by me today: Yes     Code Status: Full code     The pt was discussed and evaluated with Dr. Hayes, attending physician, who agrees with the assessment and plan above.     Kala Chiang, APRN CNP          Objective     /82   Temp 99.7  F (37.6  C)   Resp 18   Ht 1.676 m (5' 6\")   Wt 95.6 kg (210 lb 12.2 oz)   SpO2 99%   BMI 34.02 kg/m    Temp:  [99.5  F (37.5  C)-100.4  F (38  C)] 99.7  F (37.6  C)  Heart Rate:  [] 115  Resp:  [12-29] 18  BP: (129)/(82) 129/82  MAP:  [63 mmHg-107 mmHg] 80 mmHg  Arterial Line BP: ()/(49-94) 100/66  FiO2 (%):  [40 %] 40 %  SpO2:  [96 %-100 %] 99 %  Wt Readings from Last 2 Encounters:   05/25/20 95.6 kg (210 lb 12.2 oz)     I/O last 3 completed shifts:  In: 1774.43 [I.V.:844.43; NG/GT:930]  Out: 2575 [Urine:2475; Emesis/NG output:100]      GENERAL APPEARANCE: Intubated, sedated. NAD.  HEENT: No icterus, PERRL 2 mm, ETT in place, OG tube in place  CARDIOVASCULAR: sinus tachycardia, normal S1 and S2, no S3 or S4 and no murmur, click or rub. Normal PMI. Pulses dopplerable.  RESP: Coarse bilaterally. Mechanical " ventilation.    GASTRO: Soft, bowel sounds hypoactive but present  GENITOURINARY: Silva in place.  EXTREMITIES: Warm, +1 edema, pulses dopplered.  NEURO: Sedated and intubated, Pupils equal and reactive, 2 mm. Fentanyl for sedation.  INTEGUMENTARY: No rashes. Line sites CDI  LINES/TUBES/DRAINS: R radial Arterial line. ETT. OG. Silva Catheter.          Data:     Vent Settings:  Resp: 18 SpO2: 99 % O2 Device: Mechanical Ventilator      Arterial Blood Gas:   Recent Labs   Lab 05/26/20 0345 05/25/20  0448 05/24/20  0401 05/23/20  1539   PH 7.47* 7.45 7.41 7.41   PCO2 33* 32* 33* 34*   PO2 125* 94 146* 178*   HCO3 24 23 21 22   O2PER 40.0 40.0 40.0 50       Vitals:    05/23/20 0400 05/24/20 0600 05/25/20 0400   Weight: 104.1 kg (229 lb 8 oz) 99.2 kg (218 lb 11.1 oz) 95.6 kg (210 lb 12.2 oz)   I/O last 3 completed shifts:  In: 1774.43 [I.V.:844.43; NG/GT:930]  Out: 2575 [Urine:2475; Emesis/NG output:100]  Recent Labs   Lab 05/26/20 0345 05/25/20 1749   * 153*   POTASSIUM 3.6 3.4   CHLORIDE 123* 123*   CO2 23 26   ANIONGAP 6 5   * 104*   BUN 42* 38*   CR 1.08 1.08   TEN 8.6 8.4*     No components found for: URINE   Recent Labs   Lab 05/26/20 0345 05/25/20 1749 05/25/20 0448   * 123* 132*   * 268* 263*   BILITOTAL 1.1 1.2 1.3   ALBUMIN 2.8* 2.9* 2.8*   PROTTOTAL 7.0 7.4 7.1   ALKPHOS 102 110 105     Temp: 99.7  F (37.6  C) Temp src: BladderTemp  Min: 99.5  F (37.5  C)  Max: 100.4  F (38  C)   Recent Labs   Lab 05/26/20 0345 05/25/20 1749 05/25/20  0448 05/24/20  1600 05/24/20  0419   WBC 14.9* 16.3* 15.2* 14.0* 12.9*   HGB 7.7* 8.1* 8.0* 7.7* 7.4*   HCT 25.1* 26.2* 26.1* 25.1* 24.4*   * 101* 100 99 100   RDW 15.9* 15.7* 15.6* 15.4* 15.0    264 245 214 189     Recent Labs   Lab 05/21/20  0437 05/20/20  0356 05/19/20  2205 05/19/20  1550 05/19/20  0952   INR 1.19* 1.29* 1.24* 1.25* 1.29*   PTT  --  30 36 69* 69*     Recent Labs   Lab 05/26/20  0345 05/25/20  1749 05/25/20  0448  20  1600 20  0419   [facility] 102* 104* 104* 107* 100*       All imaging personally reviewed:  Recent Results (from the past 24 hour(s))   Echocardiogram Complete    Narrative    [id number]  [id number]  [id number]  [id number]^GRIS^YOSHI           [facility],[place]  Echocardiography Laboratory  [address] [place] [address]     Name: FAINA FORRESTER  MRN: [id number]  : 1980  Study Date: 2020 08:16 AM  Age: 39 yrs  Gender: Male  Patient Location: [facility]  Reason For Study: Cardiac Arrest  Ordering Physician: YOSHI LANDRY  Performed By: Denisse Johnson RDCS     BSA: 2.1 m2  Height: 66 in  Weight: 231 lb  BP: 132/58 mmHg  _____________________________________________________________________________  __        Procedure  Complete Portable Echo Adult. Technically difficult study.Extremely poor  acoustic windows.  _____________________________________________________________________________  __        Interpretation Summary  VA ECMO at 3.7L/min. Technically difficult study. Extremely poor acoustic  windows.  Severely (EF <30%) reduced left ventricular function on extremely limited  views.  The right ventricle cannot be assessed.  No pericardial effusion is present.  Previous study not available for comparison.  _____________________________________________________________________________  __        Left Ventricle  Left ventricular wall thickness cannot evaluate. Left ventricular size is  normal. Severely (EF <30%) reduced left ventricular function is present. Left  ventricular diastolic function is not assessable. Severe diffuse hypokinesis  is present.     Right Ventricle  The right ventricle cannot be assessed. Right ventricular function cannot be  assessed due to poor image quality.     Mitral Valve  The mitral valve cannot be assessed.        Aortic Valve  The aortic valve opens with each cardiac cycle. On Doppler interrogation,  there is no significant stenosis or  regurgitation.     Tricuspid Valve  The tricuspid valve cannot be assessed. Pulmonary artery systolic pressure  cannot be assessed.     Pulmonic Valve  The pulmonic valve cannot be assessed.     Vessels  The aorta root cannot be assessed. The thoracic aorta cannot be assessed.  Venous ECMO cannula is visualized traversing the IVC.     Pericardium  No pericardial effusion is present.     Compared to Previous Study  Previous study not available for comparison.     _____________________________________________________________________________  __                       Report approved by: Ashlee Izquierdo 05/18/2020 09:18 AM                 _____________________________________________________________________________  __                Medications     Current Facility-Administered Medications   Medication     0.9% sodium chloride BOLUS     0.9% sodium chloride BOLUS     acetaminophen (TYLENOL) tablet 650 mg    Or     acetaminophen (TYLENOL) Suppository 650 mg     acetylcysteine (MUCOMYST) 10 % nebulizer solution 4 mL     albuterol (PROVENTIL) neb solution 2.5 mg     artificial tears ophthalmic ointment     aspirin (ASA) chewable tablet 81 mg     calcium chloride in  mL intermittent infusion 1 g     calcium chloride injection 1 g     dexmedetomidine (PRECEDEX) 400 mcg in 0.9% sodium chloride 100 mL     dextrose 10% infusion     glucose gel 15-30 g    Or     dextrose 50 % injection 25-50 mL    Or     glucagon injection 1 mg     fentaNYL (SUBLIMAZE) bolus from infusion pump-ADULT 50 mcg     fentaNYL (SUBLIMAZE) infusion     heparin ANTICOAGULANT injection 5,000 Units     heparin lock flush 10 UNIT/ML injection 2-5 mL     heparin lock flush 10 UNIT/ML injection 5-10 mL     heparin lock flush 10 UNIT/ML injection 5-10 mL     ipratropium - albuterol 0.5 mg/2.5 mg/3 mL (DUONEB) neb solution 3 mL     ketamine (KETALAR) 25 mg/mL in sodium chloride 0.9 % 50 mL SEDATION infusion     levETIRAcetam (KEPPRA) intermittent  infusion 1,000 mg     lidocaine (LMX4) cream     lidocaine (LMX4) cream     lidocaine (LMX4) cream     lidocaine 1 % 0.1-1 mL     lidocaine 1 % 0.1-1 mL     lidocaine 1 % 0.1-1 mL     lidocaine 1% with EPINEPHrine 1:100,000 30 mL, bupivacaine (MARCAINE) 30 mL     magnesium sulfate 2 g in water intermittent infusion     magnesium sulfate 4 g in 100 mL sterile water (premade)     meropenem (MERREM) 1 g vial to attach to  mL bag     midazolam (VERSED) drip - ADULT 100 mg/100 mL in NS PRE-MIX     multivitamins w/minerals (CERTAVITE) liquid 15 mL     naloxone (NARCAN) injection 0.1-0.4 mg     pantoprazole (PROTONIX) 2 mg/mL suspension 40 mg     potassium chloride (KLOR-CON) Packet 20-40 mEq     potassium chloride 10 mEq in 100 mL intermittent infusion with 10 mg lidocaine     potassium chloride 10 mEq in 100 mL sterile water intermittent infusion (premix)     potassium chloride 20 mEq in 50 mL intermittent infusion     potassium chloride ER (KLOR-CON M) CR tablet 20-40 mEq     QUEtiapine (SEROquel) tablet 25 mg     QUEtiapine (SEROquel) tablet 50 mg     senna-docusate (SENOKOT-S/PERICOLACE) 8.6-50 MG per tablet 1 tablet     sodium chloride (PF) 0.9% PF flush 10-20 mL     sodium chloride (PF) 0.9% PF flush 3 mL     sodium chloride (PF) 0.9% PF flush 3 mL     sodium chloride (PF) 0.9% PF flush 5-50 mL     sodium phosphate 10 mmol in D5W intermittent infusion     sodium phosphate 15 mmol in D5W intermittent infusion     sodium phosphate 20 mmol in D5W intermittent infusion     sodium phosphate 25 mmol in D5W intermittent infusion     ticagrelor (BRILINTA) tablet 90 mg                        Critical Care Services Progress Note:     Scot Bishop remains critically ill with VT/VF arrest, S/P ECMO secondary to LAD STEMI     I personally examined and evaluated the patient today.   The patient s prognosis today is grave  I have evaluated all laboratory values and imaging studies from the past 24 hours.  Key findings and  decisions made today included   -diuresis  -post-pyloric TF placement   -wean fentanyl   -PST   -PT/OT   -Neurocritical care consult for CTH findings   -repeat echo w/ contrast to r/o cardioembolic source of infarcts   -EKG to f/u QTc since starting seroquel   I personally managed the ventilator, sedation, pain control and analgesia, metabolic abnormalities, antibiotic therapy, nutritional status and vasoactive medications.   Consults ongoing and ordered are none  Procedures that will happen today are: none  All treatments were placed at my direction.  I formulated today s plan with the house staff team or resident(s) and agree with the findings and plan in the associated note.       The above plans and care have been discussed with none and all questions and concerns were addressed.     I spent a total of 45 minutes (excluding procedure time) personally providing and directing critical care services at the bedside and on the critical care unit for Scot Bishop.        Grant Hayes MD

## 2020-05-26 NOTE — PROGRESS NOTES
CLINICAL NUTRITION SERVICES - REASSESSMENT NOTE     Nutrition Prescription    Recommendations:   Increase FWF to 100 ml/hr for hypernatermia     Malnutrition Status:    Unable to determine as unable to assess all nutrition parameters at this time.     Interventions by Registered Dietitian (RD):  Once post-pyloric feeding tube position verified, restart TF @ 15 ml/hr.   Adv by 15 ml q8h to goal, impact peptide 1.5 @ 55 ml/hr (pending labs).      EVALUATION OF THE PROGRESS TOWARD GOALS   Diet: NPO  Nutrition Support: Impact peptide 1.5 @ 55 ml/hr to provide 1980 kcal and 124 g protein daily. Certavite.   Intake: TF held over the past x3 days d/t emesis with coughing. Overall, pt received ~50% estimated nutrition needs over past 5-days.       NEW FINDINGS   Post-pyloric feeding tube placed today, XR verification pending.    Renal: Na 152.   Skin: Stage mucosal pressure injury on tongue. No additional nutrition interventions indicated.   Weight: Lowest at 208 lbs (95 kg) today. Down 25 lbs since admission. Cannot rule out fluctuations w/ diuresis -vs- inadequate nutrition intake. Updated dosing weight below.     Updated assessed nutrition needs   Dosing weight: 72 kg (adjusted, based on IBW of 64.5 kg and current wt of 95 kg).    Energy needs: 1400 - 1800+ kcal/day (20 - 25+ kcal/kg/day)   Justification: Maintenance, obese   Protein needs: 110 - 145 g protein/day (1.5 - 2 g/kg/day)   Justification: Increased needs, obese     MALNUTRITION  % Intake: </= 50% for >/= 5 days (severe)  % Weight Loss: Difficult to assess w/ fluctuation in fluid status   Subcutaneous Fat Loss: Unable to assess  Muscle Loss: Unable to assess  Fluid Accumulation/Edema: Unable to assess  Malnutrition Diagnosis: Unable to determine as unable to assess all nutrition parameters at this time.     Previous Goals   Total avg nutritional intake to meet a minimum of 20 kcal/kg and 1.5 g PRO/kg daily (per dosing wt 75 kg).  Evaluation: Not met    Previous  Nutrition Diagnosis  Inadequate protein-energy   Evaluation: No change    CURRENT NUTRITION DIAGNOSIS  Inadequate protein-energy intake related to NPO without nutrition support as evidenced by received <50% estimated nutrition needs over past 5 days    INTERVENTIONS  Implementation  Collaboration with other providers - Nutrition plan discussed   Enteral Nutrition - Resume TF when FT position verified     Goals  Total avg nutritional intake to meet a minimum of 20 kcal/kg and 1.5 g PRO/kg daily (per dosing wt 72 kg).    Monitoring/Evaluation  Progress toward goals will be monitored and evaluated per protocol.    Rosemary Reynoso RD, LD  [id number]  Pgr: [phone]

## 2020-05-26 NOTE — PROCEDURES
Small Bowel Feeding Tube Placement Assessment  Reason for Feeding Tube Placement: Enteral nutrition support and medication administration.  Cortrak Start Time: 1120   Cortrak End Time: 1125  Medicine Delivered During Procedure: Lidocaine gel  Placement Successful:  Yes - presumed post-pyloric placement (pending AXR confirmation)    Procedure Complications: None - uncomplicated placement. Pt tolerated well.  Final Placement [place] at exit of nare 99 cm  Face to Face time with patient: 15 minutes total with set-up    Bridle Placement:   Reason for bridle placement: Securement of nasoenteric feeding tube.  Medicine delivered during procedure: Lidocaine gel   Procedure: Successful  Location of top of clip on FT: @ 101 cm marker   Condition of nose/skin at time of bridle placement: Unremarkable   Face to Face time with patient: <5 minutes.    Shelbi Acuña RD, LD  Pager: [phone]

## 2020-05-26 NOTE — PLAN OF CARE
Patient PERRLA. Coughing fits continue but less frequent. Moves all extremities when stimulated. Does not follow commands. Temp 37.7C overnight. Sedated with fentanyl at 75 and precedex at 0.2. HR 's, BP stable. LS coarse/dim with small amounts of white/tan secretions from ETT and bloody/thin oral secretions. Cmv 40% tv 500 PEEP 5 RR 18 - PS yesterday 7/5. Sodium 152 this morning, giving 60cc flushes Q1. Needs NJ placed and TF restarted. UO /hr.     Problem: Adult Inpatient Plan of Care  Goal: Plan of Care Review  Outcome: No Change  Goal: Patient-Specific Goal (Individualization)  Outcome: No Change  Goal: Absence of Hospital-Acquired Illness or Injury  Outcome: No Change  Goal: Optimal Comfort and Wellbeing  Outcome: No Change  Goal: Readiness for Transition of Care  Outcome: No Change  Goal: Rounds/Family Conference  Outcome: No Change     Problem: Adjustment to Therapy (Extracorporeal Life Support/ECMO)  Goal: Optimal Adjustment to Therapy  Outcome: No Change     Problem: Bleeding (Extracorporeal Life Support/ECMO)  Goal: Absence of Bleeding  Outcome: No Change

## 2020-05-27 ENCOUNTER — APPOINTMENT (OUTPATIENT)
Dept: GENERAL RADIOLOGY | Facility: CLINIC | Age: 40
End: 2020-05-27
Attending: NURSE PRACTITIONER
Payer: MEDICAID

## 2020-05-27 ENCOUNTER — APPOINTMENT (OUTPATIENT)
Dept: OCCUPATIONAL THERAPY | Facility: CLINIC | Age: 40
End: 2020-05-27
Attending: NURSE PRACTITIONER
Payer: MEDICAID

## 2020-05-27 LAB
7AMINOCLONAZEPAM BLD CFM-MCNC: NEGATIVE NG/ML
ABO + RH BLD: NORMAL
ABO + RH BLD: NORMAL
ALBUMIN SERPL-MCNC: 2.8 G/DL (ref 3.4–5)
ALBUMIN UR-MCNC: 30 MG/DL
ALP SERPL-CCNC: 114 U/L (ref 40–150)
ALPRAZ SERPL-MCNC: NEGATIVE NG/ML
ALT SERPL W P-5'-P-CCNC: 242 U/L (ref 0–70)
AMPHETAMINES SPEC-MCNC: NEGATIVE NG/ML
ANION GAP SERPL CALCULATED.3IONS-SCNC: 2 MMOL/L (ref 3–14)
APAP BLD-MCNC: NEGATIVE UG/ML
APPEARANCE UR: CLEAR
AST SERPL W P-5'-P-CCNC: 113 U/L (ref 0–45)
BARBITURATES SPEC-MCNC: NEGATIVE UG/ML
BASE EXCESS BLDV CALC-SCNC: 1.9 MMOL/L
BENZODIAZ SERPL QL: POSITIVE
BENZODIAZ SPEC-MCNC: POSITIVE NG/ML
BILIRUB SERPL-MCNC: 1 MG/DL (ref 0.2–1.3)
BILIRUB UR QL STRIP: NEGATIVE
BLD GP AB SCN SERPL QL: NORMAL
BLOOD BANK CMNT PATIENT-IMP: NORMAL
BUN SERPL-MCNC: 36 MG/DL (ref 7–30)
BUPRENORPHINE SERPLBLD-MCNC: NEGATIVE NG/ML
CA-I BLD-MCNC: 4.9 MG/DL (ref 4.4–5.2)
CALCIUM SERPL-MCNC: 8.9 MG/DL (ref 8.5–10.1)
CARBOXYTHC BLD-MCNC: NEGATIVE NG/ML
CARISOPRODOL IA: NEGATIVE UG/ML
CHLORDIAZEP SERPL-MCNC: NEGATIVE NG/ML
CHLORIDE SERPL-SCNC: 124 MMOL/L (ref 94–109)
CLONAZEPAM SERPL CFM-MCNC: NEGATIVE NG/ML
CO2 SERPL-SCNC: 27 MMOL/L (ref 20–32)
COCAINE METABOLITE IA: NEGATIVE NG/ML
COLOR UR AUTO: YELLOW
CREAT SERPL-MCNC: 1.04 MG/DL (ref 0.66–1.25)
DECLARED MEDICATIONS: ABNORMAL
DESALKYLFLURAZ SERPL-MCNC: NEGATIVE NG/ML
DIAZEPAM SERPL-MCNC: NEGATIVE NG/ML
ERYTHROCYTE [DISTWIDTH] IN BLOOD BY AUTOMATED COUNT: 16.1 % (ref 10–15)
ETHANOL BLD-MCNC: NEGATIVE GM/DL
FENTANYL IA: NEGATIVE NG/ML
FLURAZEPAM SERPL-MCNC: NEGATIVE NG/ML
GABAPENTIN IA: NEGATIVE UG/ML
GFR SERPL CREATININE-BSD FRML MDRD: 89 ML/MIN/{1.73_M2}
GLUCOSE BLDC GLUCOMTR-MCNC: 106 MG/DL (ref 70–99)
GLUCOSE SERPL-MCNC: 113 MG/DL (ref 70–99)
GLUCOSE UR STRIP-MCNC: NEGATIVE MG/DL
GRAM STN SPEC: ABNORMAL
GRAM STN SPEC: ABNORMAL
HCO3 BLDV-SCNC: 26 MMOL/L (ref 21–28)
HCT VFR BLD AUTO: 27.9 % (ref 40–53)
HGB BLD-MCNC: 8.3 G/DL (ref 13.3–17.7)
HGB UR QL STRIP: ABNORMAL
INTERPRETATION ECG - MUSE: NORMAL
INTERPRETATION ECG - MUSE: NORMAL
KETONES UR STRIP-MCNC: 10 MG/DL
LACTATE BLD-SCNC: 1.3 MMOL/L (ref 0.7–2)
LEUKOCYTE ESTERASE UR QL STRIP: ABNORMAL
LORAZEPAM BLD CFM-MCNC: NEGATIVE NG/ML
MAGNESIUM SERPL-MCNC: 2.3 MG/DL (ref 1.6–2.3)
MAGNESIUM SERPL-MCNC: 2.4 MG/DL (ref 1.6–2.3)
MCH RBC QN AUTO: 30.1 PG (ref 26.5–33)
MCHC RBC AUTO-ENTMCNC: 29.7 G/DL (ref 31.5–36.5)
MCV RBC AUTO: 101 FL (ref 78–100)
MEPERIDINE SERPLBLD-MCNC: NEGATIVE NG/ML
METHADONE BLD-MCNC: NEGATIVE NG/ML
MIDAZOLAM BLD CFM-MCNC: 42 NG/ML
MUCOUS THREADS #/AREA URNS LPF: PRESENT /LPF
NITRATE UR QL: NEGATIVE
NORCHLORDIAZEP SERPL-MCNC: NEGATIVE NG/ML
NORDIAZEPAM SERPL-MCNC: NEGATIVE NG/ML
O2/TOTAL GAS SETTING VFR VENT: 30 %
OPIATES SPEC-MCNC: NEGATIVE NG/ML
OTHER DRUGS DETECTED: POSITIVE
OXAZEPAM SERPL-MCNC: NEGATIVE NG/ML
OXYCODONE SERPLBLD SCN-MCNC: NEGATIVE NG/ML
OXYHGB MFR BLDV: 73 %
PCO2 BLDV: 38 MM HG (ref 40–50)
PCP SPEC-MCNC: NEGATIVE NG/ML
PH BLDV: 7.44 PH (ref 7.32–7.43)
PH UR STRIP: 5.5 PH (ref 5–7)
PHOSPHATE SERPL-MCNC: 3.1 MG/DL (ref 2.5–4.5)
PLATELET # BLD AUTO: 348 10E9/L (ref 150–450)
PO2 BLDV: 39 MM HG (ref 25–47)
POTASSIUM SERPL-SCNC: 3.6 MMOL/L (ref 3.4–5.3)
POTASSIUM SERPL-SCNC: 3.7 MMOL/L (ref 3.4–5.3)
PROPOXYPH SPEC-MCNC: NEGATIVE NG/ML
PROT SERPL-MCNC: 7.5 G/DL (ref 6.8–8.8)
RBC # BLD AUTO: 2.76 10E12/L (ref 4.4–5.9)
RBC #/AREA URNS AUTO: 171 /HPF (ref 0–2)
SODIUM SERPL-SCNC: 154 MMOL/L (ref 133–144)
SOURCE: ABNORMAL
SP GR UR STRIP: 1.03 (ref 1–1.03)
SPECIMEN EXP DATE BLD: NORMAL
SPECIMEN SOURCE: ABNORMAL
TEMAZEPAM SERPL-MCNC: NEGATIVE NG/ML
TRAMADOL BLD-MCNC: NEGATIVE NG/ML
TRIAZOLAM BLD-MCNC: NEGATIVE NG/ML
TROPONIN I SERPL-MCNC: 0.94 UG/L (ref 0–0.04)
UROBILINOGEN UR STRIP-MCNC: NORMAL MG/DL (ref 0–2)
WBC # BLD AUTO: 14.9 10E9/L (ref 4–11)
WBC #/AREA URNS AUTO: 9 /HPF (ref 0–5)

## 2020-05-27 PROCEDURE — 86850 RBC ANTIBODY SCREEN: CPT

## 2020-05-27 PROCEDURE — 25000132 ZZH RX MED GY IP 250 OP 250 PS 637: Performed by: INTERNAL MEDICINE

## 2020-05-27 PROCEDURE — 83735 ASSAY OF MAGNESIUM: CPT | Performed by: NURSE PRACTITIONER

## 2020-05-27 PROCEDURE — 40000014 ZZH STATISTIC ARTERIAL MONITORING DAILY

## 2020-05-27 PROCEDURE — 99207 ZZC APP CREDIT; MD BILLING SHARED VISIT: CPT | Performed by: NURSE PRACTITIONER

## 2020-05-27 PROCEDURE — 20000004 ZZH R&B ICU UMMC

## 2020-05-27 PROCEDURE — 25000132 ZZH RX MED GY IP 250 OP 250 PS 637: Performed by: STUDENT IN AN ORGANIZED HEALTH CARE EDUCATION/TRAINING PROGRAM

## 2020-05-27 PROCEDURE — 25000128 H RX IP 250 OP 636

## 2020-05-27 PROCEDURE — 25000128 H RX IP 250 OP 636: Performed by: STUDENT IN AN ORGANIZED HEALTH CARE EDUCATION/TRAINING PROGRAM

## 2020-05-27 PROCEDURE — 40000275 ZZH STATISTIC RCP TIME EA 10 MIN

## 2020-05-27 PROCEDURE — 25000132 ZZH RX MED GY IP 250 OP 250 PS 637

## 2020-05-27 PROCEDURE — 94640 AIRWAY INHALATION TREATMENT: CPT | Mod: 76

## 2020-05-27 PROCEDURE — 25000128 H RX IP 250 OP 636: Performed by: NURSE PRACTITIONER

## 2020-05-27 PROCEDURE — 94640 AIRWAY INHALATION TREATMENT: CPT

## 2020-05-27 PROCEDURE — 87186 SC STD MICRODIL/AGAR DIL: CPT | Performed by: NURSE PRACTITIONER

## 2020-05-27 PROCEDURE — 87106 FUNGI IDENTIFICATION YEAST: CPT | Performed by: NURSE PRACTITIONER

## 2020-05-27 PROCEDURE — 82805 BLOOD GASES W/O2 SATURATION: CPT

## 2020-05-27 PROCEDURE — 87040 BLOOD CULTURE FOR BACTERIA: CPT

## 2020-05-27 PROCEDURE — 36415 COLL VENOUS BLD VENIPUNCTURE: CPT

## 2020-05-27 PROCEDURE — 25000125 ZZHC RX 250

## 2020-05-27 PROCEDURE — 97110 THERAPEUTIC EXERCISES: CPT | Mod: GO | Performed by: OCCUPATIONAL THERAPIST

## 2020-05-27 PROCEDURE — 27211040 ZZH CONTINUOUS NEBULIZER MICRO PUMP

## 2020-05-27 PROCEDURE — 93010 ELECTROCARDIOGRAM REPORT: CPT | Mod: 76 | Performed by: INTERNAL MEDICINE

## 2020-05-27 PROCEDURE — 82330 ASSAY OF CALCIUM: CPT

## 2020-05-27 PROCEDURE — 25000132 ZZH RX MED GY IP 250 OP 250 PS 637: Performed by: NURSE PRACTITIONER

## 2020-05-27 PROCEDURE — G0463 HOSPITAL OUTPT CLINIC VISIT: HCPCS

## 2020-05-27 PROCEDURE — 83605 ASSAY OF LACTIC ACID: CPT

## 2020-05-27 PROCEDURE — 40000276 ZZH STATISTIC RCP TIME ED VENT EA 10 MIN

## 2020-05-27 PROCEDURE — 87077 CULTURE AEROBIC IDENTIFY: CPT | Performed by: NURSE PRACTITIONER

## 2020-05-27 PROCEDURE — 97165 OT EVAL LOW COMPLEX 30 MIN: CPT | Mod: GO | Performed by: OCCUPATIONAL THERAPIST

## 2020-05-27 PROCEDURE — 25000125 ZZHC RX 250: Performed by: NURSE PRACTITIONER

## 2020-05-27 PROCEDURE — 94003 VENT MGMT INPAT SUBQ DAY: CPT

## 2020-05-27 PROCEDURE — 40000893 ZZH STATISTIC PT IP EVAL DEFER: Performed by: PHYSICAL THERAPIST

## 2020-05-27 PROCEDURE — 93005 ELECTROCARDIOGRAM TRACING: CPT

## 2020-05-27 PROCEDURE — 99291 CRITICAL CARE FIRST HOUR: CPT | Mod: 25 | Performed by: INTERNAL MEDICINE

## 2020-05-27 PROCEDURE — 00000146 ZZHCL STATISTIC GLUCOSE BY METER IP

## 2020-05-27 PROCEDURE — 80053 COMPREHEN METABOLIC PANEL: CPT | Performed by: NURSE PRACTITIONER

## 2020-05-27 PROCEDURE — 87070 CULTURE OTHR SPECIMN AEROBIC: CPT | Performed by: NURSE PRACTITIONER

## 2020-05-27 PROCEDURE — 86900 BLOOD TYPING SEROLOGIC ABO: CPT

## 2020-05-27 PROCEDURE — 84484 ASSAY OF TROPONIN QUANT: CPT | Performed by: NURSE PRACTITIONER

## 2020-05-27 PROCEDURE — 85027 COMPLETE CBC AUTOMATED: CPT | Performed by: NURSE PRACTITIONER

## 2020-05-27 PROCEDURE — 84100 ASSAY OF PHOSPHORUS: CPT | Performed by: NURSE PRACTITIONER

## 2020-05-27 PROCEDURE — 81001 URINALYSIS AUTO W/SCOPE: CPT | Performed by: NURSE PRACTITIONER

## 2020-05-27 PROCEDURE — 84132 ASSAY OF SERUM POTASSIUM: CPT | Performed by: NURSE PRACTITIONER

## 2020-05-27 PROCEDURE — 86901 BLOOD TYPING SEROLOGIC RH(D): CPT

## 2020-05-27 PROCEDURE — 87205 SMEAR GRAM STAIN: CPT | Performed by: NURSE PRACTITIONER

## 2020-05-27 PROCEDURE — A7035 POS AIRWAY PRESS HEADGEAR: HCPCS

## 2020-05-27 PROCEDURE — 71045 X-RAY EXAM CHEST 1 VIEW: CPT

## 2020-05-27 PROCEDURE — 82803 BLOOD GASES ANY COMBINATION: CPT

## 2020-05-27 RX ORDER — OXYCODONE HYDROCHLORIDE 5 MG/1
5 TABLET ORAL EVERY 4 HOURS PRN
Status: DISCONTINUED | OUTPATIENT
Start: 2020-05-27 | End: 2020-06-09

## 2020-05-27 RX ORDER — HYDRALAZINE HYDROCHLORIDE 20 MG/ML
10 INJECTION INTRAMUSCULAR; INTRAVENOUS EVERY 6 HOURS PRN
Status: DISCONTINUED | OUTPATIENT
Start: 2020-05-27 | End: 2020-06-05

## 2020-05-27 RX ORDER — FENTANYL CITRATE 50 UG/ML
50 INJECTION, SOLUTION INTRAMUSCULAR; INTRAVENOUS
Status: DISCONTINUED | OUTPATIENT
Start: 2020-05-27 | End: 2020-06-03

## 2020-05-27 RX ORDER — QUETIAPINE FUMARATE 25 MG/1
25 TABLET, FILM COATED ORAL EVERY EVENING
Status: DISCONTINUED | OUTPATIENT
Start: 2020-05-27 | End: 2020-06-08

## 2020-05-27 RX ORDER — METOPROLOL TARTRATE 25 MG/1
25 TABLET, FILM COATED ORAL 2 TIMES DAILY
Status: DISCONTINUED | OUTPATIENT
Start: 2020-05-27 | End: 2020-06-18

## 2020-05-27 RX ORDER — FENTANYL CITRATE 50 UG/ML
INJECTION, SOLUTION INTRAMUSCULAR; INTRAVENOUS
Status: COMPLETED
Start: 2020-05-27 | End: 2020-05-27

## 2020-05-27 RX ORDER — FUROSEMIDE 10 MG/ML
40 INJECTION INTRAMUSCULAR; INTRAVENOUS ONCE
Status: COMPLETED | OUTPATIENT
Start: 2020-05-27 | End: 2020-05-27

## 2020-05-27 RX ORDER — FENTANYL CITRATE 50 UG/ML
100 INJECTION, SOLUTION INTRAMUSCULAR; INTRAVENOUS ONCE
Status: COMPLETED | OUTPATIENT
Start: 2020-05-27 | End: 2020-05-27

## 2020-05-27 RX ADMIN — ACETYLCYSTEINE 4 ML: 100 SOLUTION ORAL; RESPIRATORY (INHALATION) at 08:06

## 2020-05-27 RX ADMIN — MEROPENEM 1 G: 1 INJECTION, POWDER, FOR SOLUTION INTRAVENOUS at 07:25

## 2020-05-27 RX ADMIN — Medication 40 MG: at 07:28

## 2020-05-27 RX ADMIN — Medication 50 MCG: at 08:06

## 2020-05-27 RX ADMIN — Medication 25 MG: at 09:15

## 2020-05-27 RX ADMIN — LEVALBUTEROL HYDROCHLORIDE 1.25 MG: 1.25 SOLUTION RESPIRATORY (INHALATION) at 16:00

## 2020-05-27 RX ADMIN — Medication 40 MG: at 20:13

## 2020-05-27 RX ADMIN — POTASSIUM CHLORIDE 20 MEQ: 1.5 POWDER, FOR SOLUTION ORAL at 13:44

## 2020-05-27 RX ADMIN — HEPARIN SODIUM 5000 UNITS: 5000 INJECTION, SOLUTION INTRAVENOUS; SUBCUTANEOUS at 13:44

## 2020-05-27 RX ADMIN — Medication 25 MG: at 20:10

## 2020-05-27 RX ADMIN — ACETAMINOPHEN 650 MG: 325 TABLET ORAL at 07:28

## 2020-05-27 RX ADMIN — POTASSIUM CHLORIDE 20 MEQ: 29.8 INJECTION, SOLUTION INTRAVENOUS at 03:56

## 2020-05-27 RX ADMIN — MEROPENEM 1 G: 1 INJECTION, POWDER, FOR SOLUTION INTRAVENOUS at 15:52

## 2020-05-27 RX ADMIN — ACETYLCYSTEINE 4 ML: 100 SOLUTION ORAL; RESPIRATORY (INHALATION) at 12:19

## 2020-05-27 RX ADMIN — OXYCODONE HYDROCHLORIDE 5 MG: 5 TABLET ORAL at 15:52

## 2020-05-27 RX ADMIN — LEVETIRACETAM 1000 MG: 10 INJECTION INTRAVENOUS at 23:03

## 2020-05-27 RX ADMIN — LEVALBUTEROL HYDROCHLORIDE 1.25 MG: 1.25 SOLUTION RESPIRATORY (INHALATION) at 00:58

## 2020-05-27 RX ADMIN — ACETAMINOPHEN 650 MG: 325 TABLET ORAL at 15:52

## 2020-05-27 RX ADMIN — QUETIAPINE FUMARATE 25 MG: 25 TABLET ORAL at 07:28

## 2020-05-27 RX ADMIN — ACETYLCYSTEINE 4 ML: 100 SOLUTION ORAL; RESPIRATORY (INHALATION) at 19:52

## 2020-05-27 RX ADMIN — TICAGRELOR 90 MG: 90 TABLET ORAL at 20:11

## 2020-05-27 RX ADMIN — QUETIAPINE FUMARATE 25 MG: 25 TABLET ORAL at 20:10

## 2020-05-27 RX ADMIN — FENTANYL CITRATE 100 MCG: 50 INJECTION INTRAMUSCULAR; INTRAVENOUS at 17:32

## 2020-05-27 RX ADMIN — FUROSEMIDE 40 MG: 10 INJECTION, SOLUTION INTRAMUSCULAR; INTRAVENOUS at 10:20

## 2020-05-27 RX ADMIN — ACETYLCYSTEINE 4 ML: 100 SOLUTION ORAL; RESPIRATORY (INHALATION) at 05:12

## 2020-05-27 RX ADMIN — ACETYLCYSTEINE 4 ML: 100 SOLUTION ORAL; RESPIRATORY (INHALATION) at 16:00

## 2020-05-27 RX ADMIN — FENTANYL CITRATE 100 MCG: 50 INJECTION, SOLUTION INTRAMUSCULAR; INTRAVENOUS at 17:32

## 2020-05-27 RX ADMIN — HEPARIN SODIUM 5000 UNITS: 5000 INJECTION, SOLUTION INTRAVENOUS; SUBCUTANEOUS at 06:05

## 2020-05-27 RX ADMIN — FENTANYL CITRATE 75 MCG/HR: 50 INJECTION INTRAVENOUS at 01:57

## 2020-05-27 RX ADMIN — LEVALBUTEROL HYDROCHLORIDE 1.25 MG: 1.25 SOLUTION RESPIRATORY (INHALATION) at 12:19

## 2020-05-27 RX ADMIN — MULTIVITAMIN 15 ML: LIQUID ORAL at 07:28

## 2020-05-27 RX ADMIN — LEVALBUTEROL HYDROCHLORIDE 1.25 MG: 1.25 SOLUTION RESPIRATORY (INHALATION) at 08:06

## 2020-05-27 RX ADMIN — LEVALBUTEROL HYDROCHLORIDE 1.25 MG: 1.25 SOLUTION RESPIRATORY (INHALATION) at 19:52

## 2020-05-27 RX ADMIN — ACETYLCYSTEINE 4 ML: 100 SOLUTION ORAL; RESPIRATORY (INHALATION) at 00:58

## 2020-05-27 RX ADMIN — ACETAMINOPHEN 650 MG: 325 TABLET ORAL at 20:10

## 2020-05-27 RX ADMIN — TICAGRELOR 90 MG: 90 TABLET ORAL at 07:28

## 2020-05-27 RX ADMIN — HEPARIN SODIUM 5000 UNITS: 5000 INJECTION, SOLUTION INTRAVENOUS; SUBCUTANEOUS at 22:59

## 2020-05-27 RX ADMIN — ASPIRIN 81 MG CHEWABLE TABLET 81 MG: 81 TABLET CHEWABLE at 07:28

## 2020-05-27 RX ADMIN — MEROPENEM 1 G: 1 INJECTION, POWDER, FOR SOLUTION INTRAVENOUS at 00:10

## 2020-05-27 RX ADMIN — OXYCODONE HYDROCHLORIDE 5 MG: 5 TABLET ORAL at 20:10

## 2020-05-27 RX ADMIN — LEVALBUTEROL HYDROCHLORIDE 1.25 MG: 1.25 SOLUTION RESPIRATORY (INHALATION) at 05:12

## 2020-05-27 RX ADMIN — LEVETIRACETAM 1000 MG: 10 INJECTION INTRAVENOUS at 10:19

## 2020-05-27 RX ADMIN — Medication 50 MCG: at 12:35

## 2020-05-27 ASSESSMENT — ACTIVITIES OF DAILY LIVING (ADL)
ADLS_ACUITY_SCORE: 26

## 2020-05-27 ASSESSMENT — MIFFLIN-ST. JEOR
SCORE: 1801.75
SCORE: 1786.75

## 2020-05-27 NOTE — PLAN OF CARE
PT 4E: PT deferral. Per discussion with OT, pt continues to be unable to follow commands since admission. At this time, PT orders not needed, OT will continue to follow for ROM and mobility as able. PT orders will be completed, please re-order if new needs arise.

## 2020-05-27 NOTE — PROGRESS NOTES
Major Shift Events: Neuro assessment unchanged. Withdraws when oral suctioning otherwise unresponsive. Continues with blank stare. Sinus tach 130s overnight. Pt had ~23 second run of VT w/ rate 230s and loss of pulsatility, episode resolved w/o intervention. Labs checked and EKG obtained, Dr. West notified. Placed on vent settings overnight. Good urine output.  Plan: per neuro  For vital signs and complete assessments, please see documentation flowsheets.

## 2020-05-27 NOTE — PROGRESS NOTES
Admitted/transferred from:    Reason for admission/transfer: lateral ICU transfer  Patient status upon admission/transfer: stable tachycardia; BP stable; on vent- pressure support  Interventions: review orders/admit to 4C/2 RN skin assessment  Plan: follow CSI plan of care  2 RN skin assessment: completed by Leyda Ramirez and Catarina Ferrell  Result of skin assessment and interventions/actions: blister to left heel; R groin ECMO site; chest abrasion, generalized bruising; split tongue d/t pressure injuries- WOC following; mepilex applied to left heel  Height, weight, drug calc weight: done  Patient belongings (see Flowsheet - Adult Profile for details): received   MDRO education (if applicable): n/a

## 2020-05-27 NOTE — PROGRESS NOTES
Cardiology Progress Note  Scot Bishop MRN: [id number]  Age: 39 year old, : 1980  Date: 2020            Assessment and Plan:     Scot Bishop is a 39 year old male who was admitted on 2020 for cardiac arrest. Pt reportedly had chest pain that started on 20. He was using Drano on his pipes at home and did not initially seek medical attention for CP and SOB since it said on the box that Drano can cause those symptoms. He took a shower and had syncopal episode after coming out of the shower. EMS was called; initial rhythm asystole. With chest compressions pt eventually had PEA and then eventually had VF in the field. After multiple cycles of epinephrine had ROSC. He was intubated in the field.   Pt was brought to [facility] ED where he regained a pulse and was brought to the Cath Lab for coronary angiogram. Initially, BP was 90s/60s as he was being transported from ED but he had recurrent cardiac arrest on arrival to Cath Lab. ECG showed ST elevation in aVR. He was promptly placed on VA ECMO. He had thrombotic occlusion of proximal LAD and underwent successful aspiration thrombectomy and PCI w/ NAZIA of proximal and mid LAD.     Interval events: 23 second run of VT overnight; resolved without intervention. Febrile to 100.9F. Continues on fentanyl 75 mcg/hr. Pressure supporting. Repeat echo yesterday to r/o cardiac source of multifocal infarcts on CTH; no thrombus noted. Had brain MRI per neurocrit recs. Significant pressure injury of tongue (w/ splitting of tongue) noted by [facility] nurse today.     Today's plan:   -repeat CXR, UA, cultures   -wean off fentanyl; use boluses as needed  -add oxycodone 5 mg q4hrs PRN   -change seroquel to 25 mg qPM only   -hydralazine PRN for HTN   -continue PST as tolerated   -increase FWF to 100 mL q1hr for Na 154   -diuresis   -ENT consult for pressure injury of tongue     Neurology: Intubated, sedated. Initial CT head negative. Cooled to 34  degrees on arrival; rewarmed 5/19 AM. EEG 5/25 showed severe diffuse encephalopathy without seizures or epileptiform discharges.  Repeat CTH 5/25: multifocal acute/subacute cerebral infarcts   Brain MRI 5/26: diffuse acute/subacute infarcts in bilat cerebral hemispheres, corpus callosum, and periventricular white matter c/w evolving diffuse hypoxic ischemic brain injury   -wean fentanyl  -versed and ketamine off   -change seroquel to 25 mg PM dose only     -Neurocrit following; appreciate their recs    Cardiovascular / Hemodynamics: Refractory VF arrest    S/p NAZIA to proximal-mid LAD  Sinus tachycardia   Peripheral VA ECMO inserted for cardiac arrest. LA 16 initially. Suspect ongoing tachycardia d/t evolving MI and post-arrest state. ECMO decannulation at bedside on 5/19; IABP discontinued 5/20. 23 second run on VT last night.   TTE 5/26/20: EF 35-40%, RV normal   EKG 5/25: Sinus tachycardia, QTc 431.   -add metoprolol tartrate 25 mg BID   -continue ASA 81mg daily and ticagrelor 90mg BID   -hold statin for now given likely hepatic injury during arrest  -hold ACE/ARB for now given likely reduced renal fxn after arrest   Pulmonary: Acute hypoxic respiratory failure  COVID negative  Admission CT chest showed bilateral consolidations c/w aspiration PNA. Had thick secretions that required bagging by RT on 5/20. Bronchoscopy 5/21. Increasing oxygen requirements and pt-vent dyssynchrony on 5/22. Pulmonary re-consulted, infectious work up, pt sedated. Significant pressure injury of tongue w/ splitting of tongue noted by [facility] nurse today. No active bleeding.    Vent settings this AM: 16/500/5/30%   VBG on these settings: 7.44/38/39/26   CXR 5/27: b/l opacities, unchanged. Lines in stable position.   -ENT consult for pressure injury of tongue   -PST as tolerated    -continue mucomyst and albuterol nebs  -added hypertonic saline nebs per pulm   -growing GNR in sputum 5/22; continue meropenem (5/22 - 6/5)     -wean vent as  "able  -daily CXR  -Q12h VBGs and PRN   -consider scheduled duonebs if signs of lung dz, currently PRN       GI and Nutrition: Shock liver  GIB, resolved    -> 242,  -> 113. T bili 1.0. Restarted TF.   -post-pyloric FT placement yesterday  -monitor BID LFTs  -continue TF   -bowel regimen   -GI Prophylaxis: Protonix BID for UGIB   Renal, Fluid and Electrolytes: Acute kidney injury   Cr improved to 1.04 this AM. 2.5L UOP and net positive 169 mL yesterday after lasix 40 mg IV x 1. Na 154 today.   -increase  mL q 1 hr   -lasix 40 mg IV this AM  -monitor I/O  -maintain K>3.8 and Mg>2    Infectious Disease: Aspiration pneumonia  Leukocytosis  WBC 14.9, tmax 100.6F overnight. Blood cultures negative thus far. Grew GNR, acinetobacter (not baumannii) in sputum cx.   -repeat CXR, UA, cultures given fever overnight   -vancomycin/zosyn x5 days (5/17-5/22) for asp PNA   -meropenem through 6/5 for GNR in sputum    -monitor for signs of infection given lines and leukocytosis   Hematology and Oncology: Receiving ASA/ticagrelor for NAZIA. Transfused 1 unit PRBCs 5/21. Hgb 8.3 this AM. No signs of active bleeding.   -transfuse for Hgb<7   -DVT PPX: subcutaneous heparin    Endocrinology: No known medical history. BG elevated.  -not requiring insulin gtt  -HgbA1c 5.2   Lines:       R radial arterial line May 17, 2020  ETT May 17, 2020  Silva catheter May 17, 2020  OG tube May 17, 2020  NJ tube May 26, 2020   Restraint: not currently needed    Current lines are required for patient management       Family update by me today: Yes     Code Status: Full code     The pt was discussed and evaluated with Dr. Matamoros, attending physician, who agrees with the assessment and plan above.     Kala Chiang, DNP APRN CNP          Objective     /78   Temp 100.4  F (38  C)   Resp 23   Ht 1.676 m (5' 6\")   Wt 94.9 kg (209 lb 3.5 oz)   SpO2 99%   BMI 33.77 kg/m    Temp:  [99.1  F (37.3  C)-100.6  F (38.1  C)] 100.4  F " (38  C)  Heart Rate:  [102-137] 125  Resp:  [12-24] 23  BP: (117)/(78) 117/78  MAP:  [72 mmHg-103 mmHg] 92 mmHg  Arterial Line BP: ()/(51-83) 121/78  FiO2 (%):  [40 %] 40 %  SpO2:  [97 %-100 %] 99 %  Wt Readings from Last 2 Encounters:   05/27/20 94.9 kg (209 lb 3.5 oz)     I/O last 3 completed shifts:  In: 2654.3 [I.V.:899.3; NG/GT:1620]  Out: 2350 [Urine:2350]      GENERAL APPEARANCE: Intubated, sedated. NAD.  HEENT: No icterus, PERRL 2 mm, ETT in place, NJ tube in place  CARDIOVASCULAR: sinus tachycardia, normal S1 and S2, no S3 or S4 and no murmur, click or rub. Normal PMI. Pulses dopplerable.  RESP: Coarse bilaterally. Mechanical ventilation.    GASTRO: Soft, bowel sounds hypoactive but present  GENITOURINARY: Silva in place.  EXTREMITIES: Warm, +1 edema, pulses dopplered.  NEURO: Sedated and intubated, Pupils equal and reactive, 2 mm. Fentanyl for sedation.  INTEGUMENTARY: No rashes. Line sites CDI  LINES/TUBES/DRAINS: R radial Arterial line. ETT. OG. NJ. Silva Catheter.          Data:     Vent Settings:  Resp: 23 SpO2: 99 % O2 Device: Mechanical Ventilator      Arterial Blood Gas:   Recent Labs   Lab 05/27/20  0312 05/26/20  0345 05/25/20  0448 05/24/20  0401 05/23/20  1539   PH  --  7.47* 7.45 7.41 7.41   PCO2  --  33* 32* 33* 34*   PO2  --  125* 94 146* 178*   HCO3  --  24 23 21 22   O2PER 30.0 40.0 40.0 40.0 50       Vitals:    05/25/20 0400 05/26/20 0600 05/27/20 0400   Weight: 95.6 kg (210 lb 12.2 oz) 94.8 kg (208 lb 15.9 oz) 94.9 kg (209 lb 3.5 oz)   I/O last 3 completed shifts:  In: 2654.3 [I.V.:899.3; NG/GT:1620]  Out: 2350 [Urine:2350]  Recent Labs   Lab 05/27/20 0312 05/26/20  1751   * 152*   POTASSIUM 3.7 3.8   CHLORIDE 124* 122*   CO2 27 24   ANIONGAP 2* 6   * 102*   BUN 36* 36*   CR 1.04 1.03   TEN 8.9 8.8     No components found for: URINE   Recent Labs   Lab 05/27/20 0312 05/26/20  1751 05/26/20  0345   * 119* 110*   * 252* 241*   BILITOTAL 1.0 1.1 1.1    ALBUMIN 2.8* 2.9* 2.8*   PROTTOTAL 7.5 7.5 7.0   ALKPHOS 114 113 102     Temp: 100.4  F (38  C) Temp src: BladderTemp  Min: 99.1  F (37.3  C)  Max: 100.6  F (38.1  C)   Recent Labs   Lab 20  03420  0448   WBC 14.9* 16.6* 14.9* 16.3* 15.2*   HGB 8.3* 8.5* 7.7* 8.1* 8.0*   HCT 27.9* 28.1* 25.1* 26.2* 26.1*   * 100 101* 101* 100   RDW 16.1* 16.1* 15.9* 15.7* 15.6*    339 283 264 245     Recent Labs   Lab 20  0437   INR 1.19*     Recent Labs   Lab 20  0448   * 102* 102* 104* 104*       All imaging personally reviewed:  Recent Results (from the past 24 hour(s))   Echocardiogram Complete    Narrative    [id number]  [id number]  [id number]  [id number]^GRIS^YOSHI           [facility],[place]  Echocardiography Laboratory  [address] [place] [id number]     Name: FAINA FORRESTER  MRN: [id number]  : 1980  Study Date: 2020 08:16 AM  Age: 39 yrs  Gender: Male  Patient Location: [facility]  Reason For Study: Cardiac Arrest  Ordering Physician: YOSHI LANDRY  Performed By: Denisse Johnson RDCS     BSA: 2.1 m2  Height: 66 in  Weight: 231 lb  BP: 132/58 mmHg  _____________________________________________________________________________  __        Procedure  Complete Portable Echo Adult. Technically difficult study.Extremely poor  acoustic windows.  _____________________________________________________________________________  __        Interpretation Summary  VA ECMO at 3.7L/min. Technically difficult study. Extremely poor acoustic  windows.  Severely (EF <30%) reduced left ventricular function on extremely limited  views.  The right ventricle cannot be assessed.  No pericardial effusion is present.  Previous study not available for comparison.  _____________________________________________________________________________  __        Left  Ventricle  Left ventricular wall thickness cannot evaluate. Left ventricular size is  normal. Severely (EF <30%) reduced left ventricular function is present. Left  ventricular diastolic function is not assessable. Severe diffuse hypokinesis  is present.     Right Ventricle  The right ventricle cannot be assessed. Right ventricular function cannot be  assessed due to poor image quality.     Mitral Valve  The mitral valve cannot be assessed.        Aortic Valve  The aortic valve opens with each cardiac cycle. On Doppler interrogation,  there is no significant stenosis or regurgitation.     Tricuspid Valve  The tricuspid valve cannot be assessed. Pulmonary artery systolic pressure  cannot be assessed.     Pulmonic Valve  The pulmonic valve cannot be assessed.     Vessels  The aorta root cannot be assessed. The thoracic aorta cannot be assessed.  Venous ECMO cannula is visualized traversing the IVC.     Pericardium  No pericardial effusion is present.     Compared to Previous Study  Previous study not available for comparison.     _____________________________________________________________________________  __                       Report approved by: Ashlee Izquierdo 05/18/2020 09:18 AM                 _____________________________________________________________________________  __                Medications     Current Facility-Administered Medications   Medication     0.9% sodium chloride BOLUS     0.9% sodium chloride BOLUS     acetaminophen (TYLENOL) tablet 650 mg    Or     acetaminophen (TYLENOL) Suppository 650 mg     acetylcysteine (MUCOMYST) 10 % nebulizer solution 4 mL     albuterol (PROVENTIL) neb solution 2.5 mg     artificial tears ophthalmic ointment     aspirin (ASA) chewable tablet 81 mg     calcium chloride in  mL intermittent infusion 1 g     calcium chloride injection 1 g     dexmedetomidine (PRECEDEX) 400 mcg in 0.9% sodium chloride 100 mL     dextrose 10% infusion     glucose gel  15-30 g    Or     dextrose 50 % injection 25-50 mL    Or     glucagon injection 1 mg     fentaNYL (SUBLIMAZE) bolus from infusion pump-ADULT 50 mcg     fentaNYL (SUBLIMAZE) infusion     heparin ANTICOAGULANT injection 5,000 Units     heparin lock flush 10 UNIT/ML injection 2-5 mL     heparin lock flush 10 UNIT/ML injection 5-10 mL     heparin lock flush 10 UNIT/ML injection 5-10 mL     ipratropium - albuterol 0.5 mg/2.5 mg/3 mL (DUONEB) neb solution 3 mL     ketamine (KETALAR) 25 mg/mL in sodium chloride 0.9 % 50 mL SEDATION infusion     levalbuterol (XOPENEX) neb solution 1.25 mg     levETIRAcetam (KEPPRA) intermittent infusion 1,000 mg     lidocaine (LMX4) cream     lidocaine (LMX4) cream     lidocaine (LMX4) cream     lidocaine 1 % 0.1-1 mL     lidocaine 1 % 0.1-1 mL     lidocaine 1 % 0.1-1 mL     lidocaine 1% with EPINEPHrine 1:100,000 30 mL, bupivacaine (MARCAINE) 30 mL     magnesium sulfate 2 g in water intermittent infusion     magnesium sulfate 4 g in 100 mL sterile water (premade)     meropenem (MERREM) 1 g vial to attach to  mL bag     multivitamins w/minerals (CERTAVITE) liquid 15 mL     naloxone (NARCAN) injection 0.1-0.4 mg     pantoprazole (PROTONIX) 2 mg/mL suspension 40 mg     potassium chloride (KLOR-CON) Packet 20-40 mEq     potassium chloride 10 mEq in 100 mL intermittent infusion with 10 mg lidocaine     potassium chloride 10 mEq in 100 mL sterile water intermittent infusion (premix)     potassium chloride 20 mEq in 50 mL intermittent infusion     potassium chloride ER (KLOR-CON M) CR tablet 20-40 mEq     QUEtiapine (SEROquel) tablet 25 mg     QUEtiapine (SEROquel) tablet 50 mg     senna-docusate (SENOKOT-S/PERICOLACE) 8.6-50 MG per tablet 1 tablet     sodium chloride (NEBUSAL) 3 % neb solution 3 mL     sodium chloride (PF) 0.9% PF flush 10 mL     sodium chloride (PF) 0.9% PF flush 10-20 mL     sodium chloride (PF) 0.9% PF flush 3 mL     sodium chloride (PF) 0.9% PF flush 3 mL     sodium  chloride (PF) 0.9% PF flush 5-50 mL     sodium phosphate 10 mmol in D5W intermittent infusion     sodium phosphate 15 mmol in D5W intermittent infusion     sodium phosphate 20 mmol in D5W intermittent infusion     sodium phosphate 25 mmol in D5W intermittent infusion     ticagrelor (BRILINTA) tablet 90 mg

## 2020-05-27 NOTE — PROGRESS NOTES
CVTS Progress Note     Patient is a 39 year old year old male s/p ECMO decan on 5/19/2020. Prevena wound vac removed 5/26. Incision c/d/i, no signs of infection. Retention sutures in place, can be removed by primary team in one week (6/2), please call if assistance is needed. Please cleanse wound with Microklenz BID, otherwise keep incision dry. Other cares per primary team. Do not hesitate to call with any questions or concerns.    Jah Giraldo PA-C  Cardiothoracic Surgery  May 27, 2020 2:30 PM   p: [phone]

## 2020-05-27 NOTE — PLAN OF CARE
Discharge Planner OT   Patient plan for discharge: pt not able to participate  Current status: Pt was a passive participant for UE and LE PROM. VSS during ranging, no contractures noted.  Barriers to return to prior living situation: medical status  Recommendations for discharge: TCU  Rationale for recommendations: to increase ADL I and tolerance       Entered by: Raman Covington 05/27/2020 9:29 AM

## 2020-05-27 NOTE — PLAN OF CARE
Major Shift Events:   No changes to neuro exam. Spontaneous movement noted in the L upper extremity when coughing. Moves neck back and forth continuously. PERRL. PRNs ordered for systolic > 140. Hypertensive at times. HR 110s-140s. PST throughout day 7/5 30%. 1x large liquid BM. TF at goal, FWF 100mL q1hr. Lasix given x1 today. Pressure ulcer noted on tongue, see WOC/ENT note. CSI aware.  Plan: Monitor neuro status, PST, PT/OT  For vital signs and complete assessments, please see documentation flowsheets.

## 2020-05-27 NOTE — PROGRESS NOTES
[facility] Nurse Inpatient Pressure Injury Assessment   Reason for consultation: Evaluate and treat tongue      ASSESSMENT  Pressure Injury: on left top of tongue , hospital acquired ,   This is a Medical Device Related Pressure Injury (MDRPI) due to ETT  Pressure Injury is Stage Mucosal   Contributing factor of the pressure injury: pressure, microclimate and immobility  Status: Significantly deteriorated now has split tongue 5/27  Recommend provider order: Spoke to Kala from medical team, recommend ENT involvement and team is going to have care conference with family     TREATMENT PLAN  Left top of tongue mucosal pressure injury: Good oral hygiene and routine repositioning of ETT (ensure tongue is disengaged with every reposition), attempt to not reposition ETT on wound. Offload tubing with rolled towel.    Orders Updated  WO Nurse follow-up plan:twice weekly  Nursing to notify the Provider(s) and re-consult the [facility] Nurse if wound(s) deteriorates or new skin concern.    Patient History  According to provider note(s):  Scot Bishop is a 39 year old male who was admitted on 5/17/2020 for cardiac arrest. Pt reportedly had chest pain that started on 5/16/20. He was using Drano on his pipes at home and did not initially seek medical attention for CP and SOB since it said on the box that Drano can cause those symptoms. He took a shower and had syncopal episode after coming out of the shower. EMS was called; initial rhythm asystole. With chest compressions pt eventually had PEA and then eventually had VF in the field. After multiple cycles of epinephrine had ROSC. He was intubated in the field.   Pt was brought to [facility] ED where he regained a pulse and was brought to the Cath Lab for coronary angiogram. Initially, BP was 90s/60s as he was being transported from ED but he had recurrent cardiac arrest on arrival to Cath Lab. ECG showed ST elevation in aVR. He was promptly placed on VA ECMO. He had thrombotic occlusion of  proximal LAD and underwent successful aspiration thrombectomy and PCI w/ NAZIA of proximal and mid LAD.     Objective Data  Containment of urine/stool: Indwelling catheter    Current Diet/ Nutrition:  Orders Placed This Encounter      NPO for Medical/Clinical Reasons Except for: NPO but receiving Tube Feeding      Output:   I/O last 3 completed shifts:  In: 3221 [I.V.:831; NG/GT:1700]  Out: 3325 [Urine:3325]    Risk Assessment:   Sensory Perception: 1-->completely limited  Moisture: 2-->very moist  Activity: 2-->chairfast  Mobility: 1-->completely immobile  Nutrition: 3-->adequate  Friction and Shear: 1-->problem  Burak Score: 10      Labs:   Recent Labs   Lab 05/27/20  0312  05/25/20  0448  05/21/20  0437   ALBUMIN 2.8*   < > 2.8*   < >  --    HGB 8.3*   < > 8.0*   < > 7.5*   INR  --   --   --   --  1.19*   WBC 14.9*   < > 15.2*   < > 12.9*   A1C  --   --  5.2  --   --     < > = values in this interval not displayed.       Physical Exam  Skin inspection: focused tongue    Wound Location:  Left top of tongue     5/22 5/22 5/27    Date of last Photo 5/27/20  Wound History: Nurse had thought WOC was already following this wound, however WOC had only noted bite wound previously on underside of tongue. On 5/22 patient had bite on right side of tongue  5/27 Patient's tongue is now split. RN notified WOC and WOC was coming for routine assessment. Updated team, recommended ENT involvement. Team was already coordinating a care conference for family. Updated nursing manager. Per RN, have been doing all of the interventions recommended including have been offloading ETT tubing with rolled towel.  Patient seen thrashing head side to side. Per RN this has been patient's baseline for the last couple days. Patient also has very strong cough and very strong bite.  WOC requested RT readjust ETT stabilizer to see if we can offload up any higher on tubing. RT, RN, and WOC applied new ETT stabilizer and no noticeable  difference in offloading.  Measurements (length x width x depth, in cm) unable to measure how far back wound goes.   Wound Base:  Tongue split, also appears to have missing tissue, white tongue tissue starting approximately 1.5cm back  Palpation of the wound bed: normal   Periwound skin: mucosa  Color: pale  Temperature: normal   Drainage:, none  Description of drainage: none  Odor: none  Pain: patient trashing head throughout visit unclear if any pain,       Interventions  Current support surface: Standard  Low air loss mattress  Current off-loading measures: Pillows  Repositioning aid: Pillows  Visual inspection of wound(s) completed   Tube Securement: ETT stabilizer  Wound Care: was done per plan of care.  Supplies: none  Educated provided: importance of repositioning, plan of care and wound progress  Education provided to: nurse  Discussed importance of:repositioning every 2 hours, off-loading pressure to wound and their role in pressure injury prevention  Discussed plan of care with Nurse, Nurse manager, RT, medical team, and WOC manager     Uyen Andersen RN, CWOCN

## 2020-05-27 NOTE — PLAN OF CARE
RN contacted WOC RN to assess pt's tongue at bedside. CSI NP notified and assessed wound. ETT robert exchanged, made minimal difference in pressure reduction. All other preventions in place. Will continue to monitor.

## 2020-05-27 NOTE — PROGRESS NOTES
05/27/20 0902   Quick Adds   Type of Visit Initial Occupational Therapy Evaluation   Living Environment   Lives With child(lorrie), dependent;significant other   Living Environment Comment pt not following commands, only info from chart review   Self-Care   Current Activity Tolerance poor   Functional Level   Prior Functional Level Comment PLOF unknown   General Information   Onset of Illness/Injury or Date of Surgery - Date 05/17/20   Referring Physician Kala Chiang, APRN CNP    Patient/Family Goals Statement Pt unable to verbalize   Additional Occupational Profile Info/Pertinent History of Current Problem Scot Bishop is a 39 year old admitted on 5/17/2020 with cardiac arrest s/p [facility] ECMO--decannulated on 5/19   Precautions/Limitations   (intubated)   Cognitive Status Examination   Orientation not oriented to person, place or time   Visual Perception   Visual Perception Comments unable to assess, does not track   Sensory Examination   Sensory Comments unable to assess   Pain Assessment   Patient Currently in Pain   (no visual signs)   Integumentary/Edema   Integumentary/Edema no deficits were identifed   Range of Motion (ROM)   ROM Comment no AROM   Strength   Strength Comments no active movements   Muscle Tone Assessment   Muscle Tone Comments WNL   Coordination   Coordination Comments no active movements   Transfer Skill: Sit to Stand   Level of [place]: Sit/Stand unable to perform   Bathing   Level of [place] dependent (less than 25% patients effort)   Upper Body Dressing   Level of [place]: Dress Upper Body dependent (less than 25% patients effort)   Lower Body Dressing   Level of [place]: Dress Lower Body dependent (less than 25% patients effort)   Toileting   Level of [place]: Toilet dependent (less than 25% patients effort)   Grooming   Level of [place]: Grooming dependent (less than 25% patients effort)   Activities of Daily Living Analysis   Impairments Contributing  "to Impaired Activities of Daily Living   (Pt intubated and not following any commands)   General Therapy Interventions   Planned Therapy Interventions ADL retraining;home program guidelines;progressive activity/exercise   Clinical Impression   Criteria for Skilled Therapeutic Interventions Met yes, treatment indicated   OT Diagnosis decreased ADL I   Influenced by the following impairments no command following, intubated   Assessment of Occupational Performance 5 or more Performance Deficits   Identified Performance Deficits all ADLs and IADLs   Clinical Decision Making (Complexity) Low complexity   Therapy Frequency 3x/week   Predicted Duration of Therapy Intervention (days/wks) 6/27/2020   Anticipated Equipment Needs at Discharge   (TBD)   Anticipated Discharge Disposition Transitional Care Facility   Risks and Benefits of Treatment have been explained. Yes   Patient, Family & other staff in agreement with plan of care Yes   Clinical Impression Comments Pt may benefit from skilled OT to help increase ADL I and tolerance   [facility] TM \"6 Clicks\"   2016, Trustees of [facility], under license to [employer]  All rights reserved.   6 Clicks Short Forms Daily Activity Inpatient Short Form   [facility]  \"6 Clicks\" Daily Activity Inpatient Short Form   1. Putting on and taking off regular lower body clothing? 1 - Total   2. Bathing (including washing, rinsing, drying)? 1 - Total   3. Toileting, which includes using toilet, bedpan or urinal? 1 - Total   4. Putting on and taking off regular upper body clothing? 1 - Total   5. Taking care of personal grooming such as brushing teeth? 1 - Total   6. Eating meals? 1 - Total   Daily Activity Raw Score (Score out of 24.Lower scores equate to lower levels of function) 6   Total Evaluation Time   Total Evaluation Time (Minutes) 5     "

## 2020-05-27 NOTE — CONSULTS
Otolaryngology Consult  Note  May 27, 2020    Reason for consult: Anterior tongue pressure wound    HPI: (Obtained from chart review, RN and NP)  Scot Bishop is a 39 year old male who was admitted to the hospital on 5/17/20 after syncopal episode with asytole, PEA, v- fib requiring ACLS protocol (with ROSC) and intubation in the field. Patient was taken to the cath lab from the ED and experiences a second arrest. He was placed on VA ECMO and underwent successful PCI. Patient has remained orotracheally intubated since. He is off sedation and remains encephalopathic with concern of anoxic brain injury.     On chart review, there are several pictures of patient's tongue injury (in media tab) obtained by the [facility]. On 5/18, the photography shows a small left ventral tongue laceration. On 5/22 there are photos of a small left dorsal tongue injury (?laceration) and right lateral tongue ecchymosis. Today (5/27) and anterior tongue wound was noted.     No past medical history on file.    Past Surgical History:   Procedure Laterality Date     CV CORONARY ANGIOGRAM N/A 5/17/2020    Procedure: Coronary Angiogram;  Surgeon: Chadd Newby MD;  Location: [facility] CARDIAC CATH LAB     CV EXTRACORPERAL MEMBRANE OXYGENATION N/A 5/17/2020    Procedure: Extracorporeal Membrance Oxygenation;  Surgeon: Chadd Newby MD;  Location: [facility] CARDIAC CATH LAB     CV INTRA-AORTIC BALLOON PUMP INSERTION N/A 5/17/2020    Procedure: Intra-Aortic Balloon Pump Insertion;  Surgeon: Chadd Newby MD;  Location: [facility] CARDIAC CATH LAB     CV PCI STENT DRUG ELUTING N/A 5/17/2020    Procedure: Percutaneous Coronary Intervention Stent Drug Eluting;  Surgeon: Chadd Newby MD;  Location: [facility] CARDIAC CATH LAB     REMOVE EXTRACORPORAL MEMBRANE OXYGENATOR ADULT (OUTSIDE OR) N/A 5/19/2020    Procedure: ECMO Decannulation at Bedside;  Surgeon: Mariano Workman MD;  Location:  OR       No current outpatient  "medications on file.        No Known Allergies    Social History     Socioeconomic History     Marital status: Single     Spouse name: Not on file     Number of children: Not on file     Years of education: Not on file     Highest education level: Not on file   Occupational History     Not on file   Social Needs     Financial resource strain: Not on file     Food insecurity     Worry: Not on file     Inability: Not on file     Transportation needs     Medical: Not on file     Non-medical: Not on file   Tobacco Use     Smoking status: Not on file   Substance and Sexual Activity     Alcohol use: Not on file     Drug use: Not on file     Sexual activity: Not on file   Lifestyle     Physical activity     Days per week: Not on file     Minutes per session: Not on file     Stress: Not on file   Relationships     Social connections     Talks on phone: Not on file     Gets together: Not on file     Attends [employer] service: Not on file     Active member of club or organization: Not on file     Attends meetings of clubs or organizations: Not on file     Relationship status: Not on file     Intimate partner violence     Fear of current or ex partner: Not on file     Emotionally abused: Not on file     Physically abused: Not on file     Forced sexual activity: Not on file   Other Topics Concern     Not on file   Social History Narrative     Not on file       No family history on file.    ROS: Unable to obtain    PHYSICAL EXAM:    /73   Temp 99.9  F (37.7  C)   Resp 13   Ht 1.676 m (5' 6\")   Wt 94.9 kg (209 lb 3.5 oz)   SpO2 100%   BMI 33.77 kg/m     General:Supine in bed. Opens eyes to touch and discomfort but not following commands. Moves head from side to side during examination  HEAD: normocephalic,   Face: symmetric, no edema, or erythema or obvious facial droop. Not   Eyes: Pupils round equal, clear sclera  Ears: Auricles are normal. No otorrhea  Nose: Normal external nose. No rhinorrhea  Mouth: Orotracheally " intubated. Lips normal. ETT with bite block resting directing on central portion of the tongue and directly over the wound. This remained the case when the ETT tube was positioned laterally on either side of the head gear. The head gear and bite block were removed for exam by the respiratory therapist. Patient was given fentanyl by RN due to agitation and coughing. There is a 1.5-2 cm erosion of the central tip of tongue with a fibrinous wound bed. There is no bleeding in this area. The remainder of the tongue and oral cavity looked normal. Patient was moving head from side to side and coughing during exam. A new head gear without bite block was placed by RT. A long strip of vaseline guaze was placed over wound and the distal end was taped to ETT. A bite block made from rolled guaze was placed between the right molars.   Neck: Soft and flat   Neuro: unable to test  Respiratory: mechanically ventilated             ROUTINE IP LABS (Last four results)  BMP  Recent Labs   Lab 05/27/20  1225 05/27/20 0312 05/26/20 1751 05/26/20  1241 05/26/20 0345 05/25/20  1749   NA  --  154* 152*  --  152* 153*   POTASSIUM 3.6 3.7 3.8 3.3* 3.6 3.4   CHLORIDE  --  124* 122*  --  123* 123*   TEN  --  8.9 8.8  --  8.6 8.4*   CO2  --  27 24  --  23 26   BUN  --  36* 36*  --  42* 38*   CR  --  1.04 1.03  --  1.08 1.08   GLC  --  113* 102*  --  102* 104*     CBC  Recent Labs   Lab 05/27/20 0312 05/26/20  1751 05/26/20  0345 05/25/20  1749   WBC 14.9* 16.6* 14.9* 16.3*   RBC 2.76* 2.80* 2.48* 2.59*   HGB 8.3* 8.5* 7.7* 8.1*   HCT 27.9* 28.1* 25.1* 26.2*   * 100 101* 101*   MCH 30.1 30.4 31.0 31.3   MCHC 29.7* 30.2* 30.7* 30.9*   RDW 16.1* 16.1* 15.9* 15.7*    339 283 264     INR  Recent Labs   Lab 05/21/20  0437   INR 1.19*           Assessment and Plan  Scot Bishop is a 39 year old male who had an MI with asystole, PEA and V-fiib requiring advance resuscitative effort and intubation s/p ECMO and PCI who now has a 1.5 -  2cm pressure/erosive wound to his anterior central tongue likely from the ETT tube and ETT tube bite block which were resting directly on the area despite lateral movement of the tube on the head gear. This wound is not likely to be a malignancy given the time course and prior multiple photographs of his tongue (by [facility]) since admission    This wound cannot be repaired at bedside as patient is not able to cooperate with exam and per RN and NP, he is difficult to sedate. The ETT tube bite block and head gear was removed by RT and a new head gear without a bite block was placed to reduce the pressure/contact on the tongue. A long strip of vaseline guaze was placed over wound and the distal end was taped to ETT. A bit block made from rolled guaze was placed between the right molar.     - Keep pressure off the tongue as much as possible. DO NOT allow ETT to rest directly on anterior tongue. Frequently move ETT from side to side to decrease tongue pressure.   - Place vaseline guaze or xeroform on anterior tongue wound at all times. ONLY PLACE ONE LONG PIECE AND ENSURE DISTAL END IS OUT OF THE MOUTH to prevent aspiration of guaze  - Peridex rinses QID  - Can place soft bite block between molars laterally (avoid placing anteriorly). Can use rolled up guaze and tape or obtain an actual bite block from OR/central supplies  - Early extubation/ trach if possible to decrease pressure on tongue  - We will await care conference on Friday to determine if surgical intervention will be necessary vs healing by secondary intention    Patient and plan will be discussed with Dr. Franky Brandon MD PGY-5  Otolaryngology- Head and Neck Surgery  Pager: [phone]  Please contact ENT with questions by dialing [phone] and entering job code [phone] when prompted.    I, Shelbi Wilkins MD, discussed this patient with the resident/fellow at the time of consultation. I have reviewed the note, labs, imaging and am in agreement with the  assessment and plan. I did not see the patient.  Shelbi Wilkins MD

## 2020-05-28 ENCOUNTER — APPOINTMENT (OUTPATIENT)
Dept: NEUROLOGY | Facility: CLINIC | Age: 40
End: 2020-05-28
Attending: NURSE PRACTITIONER
Payer: MEDICAID

## 2020-05-28 ENCOUNTER — APPOINTMENT (OUTPATIENT)
Dept: GENERAL RADIOLOGY | Facility: CLINIC | Age: 40
End: 2020-05-28
Attending: STUDENT IN AN ORGANIZED HEALTH CARE EDUCATION/TRAINING PROGRAM
Payer: MEDICAID

## 2020-05-28 LAB
ANION GAP SERPL CALCULATED.3IONS-SCNC: 6 MMOL/L (ref 3–14)
BACTERIA SPEC CULT: NO GROWTH
BASE EXCESS BLDA CALC-SCNC: 2 MMOL/L
BUN SERPL-MCNC: 31 MG/DL (ref 7–30)
CALCIUM SERPL-MCNC: 8.9 MG/DL (ref 8.5–10.1)
CHLORIDE SERPL-SCNC: 118 MMOL/L (ref 94–109)
CO2 SERPL-SCNC: 25 MMOL/L (ref 20–32)
CREAT SERPL-MCNC: 0.93 MG/DL (ref 0.66–1.25)
ERYTHROCYTE [DISTWIDTH] IN BLOOD BY AUTOMATED COUNT: 15.7 % (ref 10–15)
GFR SERPL CREATININE-BSD FRML MDRD: >90 ML/MIN/{1.73_M2}
GLUCOSE BLDC GLUCOMTR-MCNC: 103 MG/DL (ref 70–99)
GLUCOSE BLDC GLUCOMTR-MCNC: 108 MG/DL (ref 70–99)
GLUCOSE SERPL-MCNC: 116 MG/DL (ref 70–99)
HCO3 BLD-SCNC: 26 MMOL/L (ref 21–28)
HCO3 BLD-SCNC: 26 MMOL/L (ref 21–28)
HCT VFR BLD AUTO: 29.9 % (ref 40–53)
HGB BLD-MCNC: 9 G/DL (ref 13.3–17.7)
LACTATE BLD-SCNC: 1 MMOL/L (ref 0.7–2)
MAGNESIUM SERPL-MCNC: 2.1 MG/DL (ref 1.6–2.3)
MCH RBC QN AUTO: 30.2 PG (ref 26.5–33)
MCHC RBC AUTO-ENTMCNC: 30.1 G/DL (ref 31.5–36.5)
MCV RBC AUTO: 100 FL (ref 78–100)
O2/TOTAL GAS SETTING VFR VENT: 30 %
O2/TOTAL GAS SETTING VFR VENT: 40 %
PCO2 BLD: 34 MM HG (ref 35–45)
PCO2 BLD: 36 MM HG (ref 35–45)
PH BLD: 7.47 PH (ref 7.35–7.45)
PH BLD: 7.49 PH (ref 7.35–7.45)
PHOSPHATE SERPL-MCNC: 2.9 MG/DL (ref 2.5–4.5)
PLATELET # BLD AUTO: 475 10E9/L (ref 150–450)
PO2 BLD: 141 MM HG (ref 80–105)
PO2 BLD: 61 MM HG (ref 80–105)
POTASSIUM SERPL-SCNC: 3.6 MMOL/L (ref 3.4–5.3)
POTASSIUM SERPL-SCNC: 3.7 MMOL/L (ref 3.4–5.3)
RBC # BLD AUTO: 2.98 10E12/L (ref 4.4–5.9)
SODIUM SERPL-SCNC: 150 MMOL/L (ref 133–144)
SPECIMEN SOURCE: NORMAL
TROPONIN I SERPL-MCNC: 0.55 UG/L (ref 0–0.04)
WBC # BLD AUTO: 16.6 10E9/L (ref 4–11)

## 2020-05-28 PROCEDURE — 95711 VEEG 2-12 HR UNMONITORED: CPT

## 2020-05-28 PROCEDURE — 40000014 ZZH STATISTIC ARTERIAL MONITORING DAILY

## 2020-05-28 PROCEDURE — 27210437 ZZH NUTRITION PRODUCT SEMIELEM INTERMED LITER

## 2020-05-28 PROCEDURE — 25000128 H RX IP 250 OP 636

## 2020-05-28 PROCEDURE — 25000125 ZZHC RX 250

## 2020-05-28 PROCEDURE — 25000128 H RX IP 250 OP 636: Performed by: NURSE PRACTITIONER

## 2020-05-28 PROCEDURE — 25000132 ZZH RX MED GY IP 250 OP 250 PS 637: Performed by: STUDENT IN AN ORGANIZED HEALTH CARE EDUCATION/TRAINING PROGRAM

## 2020-05-28 PROCEDURE — 94003 VENT MGMT INPAT SUBQ DAY: CPT

## 2020-05-28 PROCEDURE — 83605 ASSAY OF LACTIC ACID: CPT | Performed by: NURSE PRACTITIONER

## 2020-05-28 PROCEDURE — 87040 BLOOD CULTURE FOR BACTERIA: CPT

## 2020-05-28 PROCEDURE — 20000004 ZZH R&B ICU UMMC

## 2020-05-28 PROCEDURE — 25000132 ZZH RX MED GY IP 250 OP 250 PS 637

## 2020-05-28 PROCEDURE — 25000132 ZZH RX MED GY IP 250 OP 250 PS 637: Performed by: INTERNAL MEDICINE

## 2020-05-28 PROCEDURE — 83735 ASSAY OF MAGNESIUM: CPT | Performed by: NURSE PRACTITIONER

## 2020-05-28 PROCEDURE — 80048 BASIC METABOLIC PNL TOTAL CA: CPT | Performed by: STUDENT IN AN ORGANIZED HEALTH CARE EDUCATION/TRAINING PROGRAM

## 2020-05-28 PROCEDURE — 82803 BLOOD GASES ANY COMBINATION: CPT | Performed by: NURSE PRACTITIONER

## 2020-05-28 PROCEDURE — 84484 ASSAY OF TROPONIN QUANT: CPT | Performed by: NURSE PRACTITIONER

## 2020-05-28 PROCEDURE — A7035 POS AIRWAY PRESS HEADGEAR: HCPCS

## 2020-05-28 PROCEDURE — 82803 BLOOD GASES ANY COMBINATION: CPT

## 2020-05-28 PROCEDURE — 94640 AIRWAY INHALATION TREATMENT: CPT

## 2020-05-28 PROCEDURE — 84100 ASSAY OF PHOSPHORUS: CPT | Performed by: NURSE PRACTITIONER

## 2020-05-28 PROCEDURE — 36600 WITHDRAWAL OF ARTERIAL BLOOD: CPT

## 2020-05-28 PROCEDURE — 36415 COLL VENOUS BLD VENIPUNCTURE: CPT | Performed by: NURSE PRACTITIONER

## 2020-05-28 PROCEDURE — 25000128 H RX IP 250 OP 636: Performed by: STUDENT IN AN ORGANIZED HEALTH CARE EDUCATION/TRAINING PROGRAM

## 2020-05-28 PROCEDURE — 94640 AIRWAY INHALATION TREATMENT: CPT | Mod: 76

## 2020-05-28 PROCEDURE — 71045 X-RAY EXAM CHEST 1 VIEW: CPT

## 2020-05-28 PROCEDURE — 40000275 ZZH STATISTIC RCP TIME EA 10 MIN

## 2020-05-28 PROCEDURE — 25000132 ZZH RX MED GY IP 250 OP 250 PS 637: Performed by: NURSE PRACTITIONER

## 2020-05-28 PROCEDURE — 85027 COMPLETE CBC AUTOMATED: CPT | Performed by: NURSE PRACTITIONER

## 2020-05-28 PROCEDURE — 36415 COLL VENOUS BLD VENIPUNCTURE: CPT

## 2020-05-28 PROCEDURE — 25000125 ZZHC RX 250: Performed by: NURSE PRACTITIONER

## 2020-05-28 PROCEDURE — 99207 ZZC APP CREDIT; MD BILLING SHARED VISIT: CPT | Performed by: NURSE PRACTITIONER

## 2020-05-28 PROCEDURE — 99291 CRITICAL CARE FIRST HOUR: CPT | Performed by: INTERNAL MEDICINE

## 2020-05-28 PROCEDURE — 00000146 ZZHCL STATISTIC GLUCOSE BY METER IP

## 2020-05-28 RX ORDER — FUROSEMIDE 10 MG/ML
60 INJECTION INTRAMUSCULAR; INTRAVENOUS ONCE
Status: COMPLETED | OUTPATIENT
Start: 2020-05-28 | End: 2020-05-28

## 2020-05-28 RX ORDER — CHLORHEXIDINE GLUCONATE ORAL RINSE 1.2 MG/ML
15 SOLUTION DENTAL 4 TIMES DAILY
Status: DISCONTINUED | OUTPATIENT
Start: 2020-05-28 | End: 2020-06-25 | Stop reason: HOSPADM

## 2020-05-28 RX ADMIN — FUROSEMIDE 60 MG: 10 INJECTION, SOLUTION INTRAMUSCULAR; INTRAVENOUS at 13:44

## 2020-05-28 RX ADMIN — HEPARIN SODIUM 5000 UNITS: 5000 INJECTION, SOLUTION INTRAVENOUS; SUBCUTANEOUS at 13:43

## 2020-05-28 RX ADMIN — MIDAZOLAM 2 MG: 1 INJECTION INTRAMUSCULAR; INTRAVENOUS at 12:30

## 2020-05-28 RX ADMIN — ACETAMINOPHEN 650 MG: 325 TABLET ORAL at 20:42

## 2020-05-28 RX ADMIN — ACETYLCYSTEINE 4 ML: 100 SOLUTION ORAL; RESPIRATORY (INHALATION) at 19:43

## 2020-05-28 RX ADMIN — ACETAMINOPHEN 650 MG: 325 TABLET ORAL at 04:23

## 2020-05-28 RX ADMIN — LEVALBUTEROL HYDROCHLORIDE 1.25 MG: 1.25 SOLUTION RESPIRATORY (INHALATION) at 04:20

## 2020-05-28 RX ADMIN — ACETAMINOPHEN 650 MG: 325 TABLET ORAL at 08:34

## 2020-05-28 RX ADMIN — MIDAZOLAM 2 MG: 1 INJECTION INTRAMUSCULAR; INTRAVENOUS at 15:40

## 2020-05-28 RX ADMIN — HEPARIN SODIUM 5000 UNITS: 5000 INJECTION, SOLUTION INTRAVENOUS; SUBCUTANEOUS at 05:41

## 2020-05-28 RX ADMIN — CHLORHEXIDINE GLUCONATE 0.12% ORAL RINSE 15 ML: 1.2 LIQUID ORAL at 21:00

## 2020-05-28 RX ADMIN — ACETYLCYSTEINE 4 ML: 100 SOLUTION ORAL; RESPIRATORY (INHALATION) at 23:34

## 2020-05-28 RX ADMIN — MIDAZOLAM 2 MG: 1 INJECTION INTRAMUSCULAR; INTRAVENOUS at 20:54

## 2020-05-28 RX ADMIN — QUETIAPINE FUMARATE 25 MG: 25 TABLET ORAL at 20:42

## 2020-05-28 RX ADMIN — TICAGRELOR 90 MG: 90 TABLET ORAL at 20:42

## 2020-05-28 RX ADMIN — OXYCODONE HYDROCHLORIDE 5 MG: 5 TABLET ORAL at 00:11

## 2020-05-28 RX ADMIN — LEVALBUTEROL HYDROCHLORIDE 1.25 MG: 1.25 SOLUTION RESPIRATORY (INHALATION) at 12:32

## 2020-05-28 RX ADMIN — MIDAZOLAM 2 MG: 1 INJECTION INTRAMUSCULAR; INTRAVENOUS at 13:38

## 2020-05-28 RX ADMIN — ASPIRIN 81 MG CHEWABLE TABLET 81 MG: 81 TABLET CHEWABLE at 08:34

## 2020-05-28 RX ADMIN — Medication 25 MG: at 20:43

## 2020-05-28 RX ADMIN — LEVETIRACETAM 1000 MG: 10 INJECTION INTRAVENOUS at 12:13

## 2020-05-28 RX ADMIN — LEVALBUTEROL HYDROCHLORIDE 1.25 MG: 1.25 SOLUTION RESPIRATORY (INHALATION) at 00:28

## 2020-05-28 RX ADMIN — OXYCODONE HYDROCHLORIDE 5 MG: 5 TABLET ORAL at 04:23

## 2020-05-28 RX ADMIN — FENTANYL CITRATE 50 MCG: 50 INJECTION INTRAMUSCULAR; INTRAVENOUS at 15:40

## 2020-05-28 RX ADMIN — ACETAMINOPHEN 650 MG: 325 TABLET ORAL at 00:11

## 2020-05-28 RX ADMIN — ACETYLCYSTEINE 4 ML: 100 SOLUTION ORAL; RESPIRATORY (INHALATION) at 07:47

## 2020-05-28 RX ADMIN — ACETYLCYSTEINE 4 ML: 100 SOLUTION ORAL; RESPIRATORY (INHALATION) at 16:10

## 2020-05-28 RX ADMIN — LEVALBUTEROL HYDROCHLORIDE 1.25 MG: 1.25 SOLUTION RESPIRATORY (INHALATION) at 19:43

## 2020-05-28 RX ADMIN — FENTANYL CITRATE 50 MCG: 50 INJECTION INTRAMUSCULAR; INTRAVENOUS at 05:36

## 2020-05-28 RX ADMIN — LEVALBUTEROL HYDROCHLORIDE 1.25 MG: 1.25 SOLUTION RESPIRATORY (INHALATION) at 16:10

## 2020-05-28 RX ADMIN — FENTANYL CITRATE 50 MCG: 50 INJECTION INTRAMUSCULAR; INTRAVENOUS at 03:14

## 2020-05-28 RX ADMIN — MEROPENEM 1 G: 1 INJECTION, POWDER, FOR SOLUTION INTRAVENOUS at 08:34

## 2020-05-28 RX ADMIN — TICAGRELOR 90 MG: 90 TABLET ORAL at 08:40

## 2020-05-28 RX ADMIN — MEROPENEM 1 G: 1 INJECTION, POWDER, FOR SOLUTION INTRAVENOUS at 00:01

## 2020-05-28 RX ADMIN — LEVALBUTEROL HYDROCHLORIDE 1.25 MG: 1.25 SOLUTION RESPIRATORY (INHALATION) at 23:34

## 2020-05-28 RX ADMIN — ACETYLCYSTEINE 4 ML: 100 SOLUTION ORAL; RESPIRATORY (INHALATION) at 12:32

## 2020-05-28 RX ADMIN — Medication 40 MG: at 08:40

## 2020-05-28 RX ADMIN — Medication 25 MG: at 08:34

## 2020-05-28 RX ADMIN — MULTIVITAMIN 15 ML: LIQUID ORAL at 08:33

## 2020-05-28 RX ADMIN — HEPARIN SODIUM 5000 UNITS: 5000 INJECTION, SOLUTION INTRAVENOUS; SUBCUTANEOUS at 23:10

## 2020-05-28 RX ADMIN — OXYCODONE HYDROCHLORIDE 5 MG: 5 TABLET ORAL at 08:34

## 2020-05-28 RX ADMIN — ACETYLCYSTEINE 4 ML: 100 SOLUTION ORAL; RESPIRATORY (INHALATION) at 04:20

## 2020-05-28 RX ADMIN — POTASSIUM CHLORIDE 20 MEQ: 1.5 POWDER, FOR SOLUTION ORAL at 05:34

## 2020-05-28 RX ADMIN — ACETYLCYSTEINE 4 ML: 100 SOLUTION ORAL; RESPIRATORY (INHALATION) at 00:28

## 2020-05-28 RX ADMIN — FENTANYL CITRATE 50 MCG: 50 INJECTION INTRAMUSCULAR; INTRAVENOUS at 13:39

## 2020-05-28 RX ADMIN — MEROPENEM 1 G: 1 INJECTION, POWDER, FOR SOLUTION INTRAVENOUS at 15:42

## 2020-05-28 RX ADMIN — FENTANYL CITRATE 50 MCG: 50 INJECTION INTRAMUSCULAR; INTRAVENOUS at 16:41

## 2020-05-28 RX ADMIN — LEVALBUTEROL HYDROCHLORIDE 1.25 MG: 1.25 SOLUTION RESPIRATORY (INHALATION) at 07:47

## 2020-05-28 RX ADMIN — FENTANYL CITRATE 50 MCG: 50 INJECTION INTRAMUSCULAR; INTRAVENOUS at 23:12

## 2020-05-28 RX ADMIN — Medication 40 MG: at 20:43

## 2020-05-28 RX ADMIN — LEVETIRACETAM 1000 MG: 10 INJECTION INTRAVENOUS at 23:15

## 2020-05-28 ASSESSMENT — MIFFLIN-ST. JEOR: SCORE: 1790.75

## 2020-05-28 ASSESSMENT — ACTIVITIES OF DAILY LIVING (ADL)
ADLS_ACUITY_SCORE: 19
ADLS_ACUITY_SCORE: 19
ADLS_ACUITY_SCORE: 26
ADLS_ACUITY_SCORE: 19

## 2020-05-28 NOTE — PROGRESS NOTES
Antimicrobial Stewardship Team Note    Antimicrobial Stewardship Program - A joint venture between [facility] and  Physicians to optimize antibiotic management.  NOT a formal consult - Restricted Antimicrobial Review     Patient: Scot Bishop  MRN: [id number]  Allergies: Patient has no known allergies.    Brief Summary:   Scot Bishop is a 39-year old male with no significant past medical history who was admitted with refractory cardiac arrest on 5/17/20.       Active Anti-infective Medications   (From admission, onward)                Start     Stop    05/22/20 0800  meropenem  1 g,   Intravenous,   EVERY 8 HOURS     Aspiration Pneumonia        06/05/20 2359        HPI: The patient was admitted 5/17/20 with refractory cardiac arrest requiring ECMO cannulation and aspiration thrombectomy of the proximal LAD. Chest XR 5/17 was significant for bilateral hazy pulmonary opacities compatible with bilateral dependent atelectasis. Chest CT was done 5/17 as well, showing dependent bilateral pleural effusions with associated atelectasis/consolidation, noted to be possible aspiration in the setting of recent cardiac arrest. He was initially placed on vancomycin and piperacillin-tazobactam for empiric treatment of aspiration pneumonia.    The evening of 5/20, he became febrile (Temp 100.6F), and on 5/22 he was noted to have increased oxygen requirements and vent dysynchrony. 5/21 chest XR showed increased hazy opacities which was noted to possibly represent pulmonary edema vs. worsening infection.  On 5/22, gram negative rods grew on sputum culture from 5/20. In response to this culture growth and chest imaging from 5/21, empiric aspiration pneumonia antibiotic therapy was escalated from piperacillin-tazobactam and vancomycin to meropenem and vancomcyin.     Sputum culture on 5/17 had no growth, 5/19 sputum culture grew pan-sensitive MSSA, and the 5/21 sputum culture that grew gram negative rods  eventually speciated to show growth of Acinetobacter with intermediate susceptibility to ceftriaxone and ceftazidime. 5/20, 5/21, and 5/22 sputum cultures have also grown Acinetobacter. 5/27 sputum culture shows growth of gram negative rods and is still in progress. All blood cultures collected since admission have remained negative to date. Pulmonary has assessed the patient and noted on 5/26 that the patient had improved significantly.     The patient continued to spike many low grade fevers, with a daily Tmax of around 100-100.6F. He remains on meropenem for empiric treatment of aspiration pneumonia. Of note, the brain MRI has shown diffuse hypoxic ischemic brain injury in the setting of recent refractory cardiac arrest.     Assessment: Acinetobacter Aspiration Pneumonia  The patient was at risk of development of aspiration pneumonia due to his cardiac arrest. Chest imaging was indicative of aspiration pneumonia, and sputum cultures have consistently grown Acinetobacter, with additional Staph aureus growth on two cultures (5/19  judith 5/20).  The patient is now on day 7 of empiric treatment with meropenem, and received 5 days of broad-spectrum empiric therapy with piperacillin-tazobactam and vancomycin before this. Meropenem may be indicated for treatment of aspiration pneumonia if there are multi-drug resistant gram negative organisms present on culture such as ESBL-producing Enterobacteriaceae. The patient does not have a history of colonization with these organisms, and these organisms have not grown on cultures. The patient has remained febrile throughout his admission.  However, this does not indicate antibiotic treatment failure, as patient has been on very broad antibiotic therapy, and it is possible that fevers may be due to CNS dysfunction in the setting of anoxic brain injury. Additionally, meropenem is not the first line agent for treatment of Acinetobacter. Sulbactam is the agent of choice for  treatment of Acinetobacter, and this pneumonia would be most optimally treated with ampicillin-sulbactam. Ampicillin-sulbactam also has activity against Staph aureus that grew on two past sputum cultures. Continued treatment of meropenem will only expose patient to unnecessary use of broad-spectrum gram negative activity, and a better treatment option is available.  The duration of aspiratoin pneumonia is recommended to be 10-14 days. Since this patient is on day 7 of treatment of Acinetobacter pneumonia and has been noted to be clinically improving, would consider ending antibiotic treatment on 5/31 to complete an adequate 10 day course.    Recommendations:  -Discontinue meropenem  -Start ampicillin-sulbactam 3g IV Q6H for treatment of Acinetobacter aspiration pneumonia, with an end date of 5/31/20 to complete 10 day course    Pharmacy took the following actions: Called/paged provider, Electronic note created.    Discussed with ID Staff: Jairon Cunningham MD MBBS, and Bharti NevesD   Ramila Rivera PharmD, PGY1 Resident  Pager: [phone]     Vital Signs/Clinical Features:  Vitals         05/26 0700  -  05/27 0659 05/27 0700  -  05/28 0659 05/28 0700  -  05/28 1631   Most Recent    Temp ( F) 99.1 -  100.6    99.5 -  101.1    100.4 -  101.3     101.3 (38.5)    Heart Rate 102 -  137    103 -  141    124 -  144     140    Resp 12 -  24    13 - 28      22     22    BP   117/78    108/58 -  121/73      115/70     115/70    SpO2 (%) 97 -  100    97 -  100    99 -  100     100            Labs  Estimated Creatinine Clearance: 113.2 mL/min (based on SCr of 0.93 mg/dL).  Recent Labs   Lab Test 05/25/20  0448 05/25/20  1749 05/26/20  0345 05/26/20  1751 05/27/20  0312 05/28/20  0422   CR 1.09 1.08 1.08 1.03 1.04 0.93       Recent Labs   Lab Test 05/17/20  1615  05/24/20  1600 05/25/20  0448 05/25/20  1749 05/26/20  0345 05/26/20  1751 05/27/20  0312   WBC 23.9*   < > 14.0* 15.2* 16.3* 14.9* 16.6* 14.9*   ANEU 20.9*  --   --    --   --   --   --   --    ALYM 1.3  --   --   --   --   --   --   --    TANA 0.5  --   --   --   --   --   --   --    AEOS 0.1  --   --   --   --   --   --   --    HGB 12.0*   < > 7.7* 8.0* 8.1* 7.7* 8.5* 8.3*   HCT 35.2*   < > 25.1* 26.1* 26.2* 25.1* 28.1* 27.9*   MCV 89   < > 99 100 101* 101* 100 101*      < > 214 245 264 283 339 348    < > = values in this interval not displayed.       Recent Labs   Lab Test 05/24/20 1600 05/25/20 0448 05/25/20 1749 05/26/20 0345 05/26/20 1751 05/27/20 0312   BILITOTAL 1.3 1.3 1.2 1.1 1.1 1.0   ALKPHOS 102 105 110 102 113 114   ALBUMIN 2.8* 2.8* 2.9* 2.8* 2.9* 2.8*   * 132* 123* 110* 119* 113*   * 263* 268* 241* 252* 242*       Recent Labs   Lab Test 05/17/20  1615  05/18/20 0353 05/19/20 0415 05/20/20 0356 05/24/20 1600 05/25/20 0448 05/25/20 1749 05/26/20 0345 05/27/20 0312 05/28/20 0414   PCAL 0.40  --   --   --  22.09*  --   --   --   --   --   --   --   --   --    LACT  --    < > 4.5*   < > 3.7*   < > 2.8*   < > 1.3 1.1 1.0 1.1 1.3 1.0   CRP <2.9  --  16.0*  --  89.0*  --  100.0*  --   --   --   --   --   --   --    SED 7  --  8  --  33*  --  83*  --   --   --   --   --   --   --     < > = values in this interval not displayed.       Recent Labs   Lab Test 05/20/20  1748   VANCOMYCIN 6.9       Culture Results:  7-Day Micro Results       Procedure Component Value Units Date/Time    Blood culture [] Collected:  05/28/20 0741    Order Status:  Completed Lab Status:  Preliminary result Updated:  05/28/20 1503    Specimen:  Blood      Specimen Description Blood Right Hand     Culture Micro No growth after 6 hours    Blood culture [] Collected:  05/28/20 0736    Order Status:  Completed Lab Status:  Preliminary result Updated:  05/28/20 1503    Specimen:  Blood      Specimen Description Blood Left Hand     Culture Micro No growth after 6 hours    Blood culture     Order Status:  Canceled Lab Status:  No result     Specimen:  Blood      Blood culture     Order Status:  Canceled Lab Status:  No result     Specimen:  Blood     Sputum Culture Aerobic Bacterial [[id number]] Collected:  05/27/20 0953    Order Status:  Completed Lab Status:  Preliminary result Updated:  05/28/20 1020    Specimen:  Sputum      Specimen Description Sputum Endotracheal     Culture Micro Culture in progress    Gram stain [[id number]]  (Abnormal) Collected:  05/27/20 0953    Order Status:  Completed Lab Status:  Final result Updated:  05/27/20 1347    Specimen:  Sputum      Specimen Description Sputum Endotracheal     Gram Stain >25 PMNs/low power field      Rare  Gram negative rods      Blood culture [[id number]] Collected:  05/27/20 0853    Order Status:  Completed Lab Status:  Preliminary result Updated:  05/28/20 1503    Specimen:  Blood      Specimen Description Blood Left Hand     Culture Micro No growth after 1 day    Blood culture     Order Status:  Canceled Lab Status:  No result     Specimen:  Blood     Blood culture [[id number]] Collected:  05/22/20 1114    Order Status:  Completed Lab Status:  Final result Updated:  05/28/20 0440    Specimen:  Blood      Specimen Description Blood Left Arm     Culture Micro No growth    Blood culture     Order Status:  Canceled Lab Status:  No result     Specimen:  Blood     Sputum Culture Aerobic Bacterial [[id number]]  (Abnormal) Collected:  05/22/20 0556    Order Status:  Completed Lab Status:  Final result Updated:  05/24/20 0718    Specimen:  Sputum      Specimen Description Sputum Endotracheal     Culture Micro Light growth  Acinetobacter species, not baumannii  Identification obtained by MALDI-TOF mass spectrometry research use only database. Test   characteristics determined and verified by the [facility]   ([facility]) [place], [place].  Susceptibility testing done on previous specimen      Gram stain [[id number]]  (Abnormal) Collected:  05/22/20 0556    Order Status:  Completed Lab Status:  Final result Updated:   05/22/20 0824    Specimen:  Sputum      Specimen Description Sputum Endotracheal     Gram Stain >25 PMNs/low power field      Rare  Gram negative rods              Recent Labs   Lab Test 05/17/20 2045 05/22/20  0840 05/27/20  0932   URINEPH 5.5 6.0 5.5   NITRITE Negative Negative Negative   LEUKEST Negative Small* Small*   WBCU 7* 5 9*                         Imaging: Mr Brain W/o Contrast    Result Date: 5/26/2020  MR BRAIN W/O CONTRAST 5/26/2020 5:09 PM Provided History: CTH w/ multifocal acute/subacute infarcts. Cardiac arrest Comparison:  Head CT dated 5/25/2020 Technique: Sagittal T1-weighted and axial T2-weighted, turboFLAIR and diffusion-weighted with ADC map images of the brain were obtained without intravenous contrast. Findings: Multifocal diffusion restriction involving bilateral cerebral hemispheres, corpus callosum and left temporoparietal White matter. Diffuse microhemorrhage involving entire cerebral and the cerebellum. No significant mass effect or midline shift. No abnormal extra-axial fluid collection. The ventricles and sulci are normal for age. Normal intravascular flow voids. The visualized orbits are unremarkable. Mucosal thickening noted in the bilateral sphenoid, right maxillary and right frontal sinuses. Diffuse opacification of bilateral mastoid air cells.     Impression: 1.  Diffuse acute/subacute infarcts in bilateral cerebral hemispheres, corpus callosum and periventricular white matter, consistent with evolving diffuse hypoxic ischemic brain injury. 2.  Diffuse microhemorrhage involving infra and supratentorial brain, likely ECMO related. I have personally reviewed the examination and initial interpretation and I agree with the findings. JENNIFER CORBETT MD    Xr Chest Port 1 View    Result Date: 5/28/2020  EXAM: XR CHEST PORT 1 VW HISTORY: Endotracheal tube placement COMPARISON: 5/27/2020 FINDINGS: Portable AP view of the chest. Endotracheal tube tip projects over the mid thoracic  trachea. Gastric tube and enteric tube in stable position. Right PICC tip projects over the superior cavoatrial junction. Midline trachea. Cardiomediastinal silhouette is stable. Pulmonary vasculature is distinct. Right upper lung subsegmental atelectasis. No pleural effusion or pneumothorax. Upper abdomen is unremarkable.     IMPRESSION: 1. Endotracheal tube tip projects over the mid thoracic trachea. 2. Right upper lobe subsegmental atelectasis. 3. Pulmonary vascular congestion. I have personally reviewed the examination and initial interpretation and I agree with the findings. CRISTINA MATTSON MD    Xr Chest Port 1 View    Result Date: 5/27/2020  EXAM: XR CHEST PORT 1 VW  5/27/2020 10:00 AM  HISTORY: aspiration pneumonia COMPARISON: Chest x-ray from 5/25/2020 and 5/24/2020. FINDINGS: Semiupright portable AP radiograph of the chest. Right upper extremity PICC tip projects over the cavoatrial junction. The feeding tube and NG/OG tube course inferior to the diaphragm and out of the field of view. The tip of the NG/OG tube appears to be coiled in the fundus. The tip of the endotracheal tube is approximately 4 cm above the chaim. Low lung volumes. Trachea is midline, heart size is normal. No pleural effusion or pneumothorax. Unchanged perihilar and left basilar opacities. No acute osseous or abdominal abnormality.     IMPRESSION: 1. Unchanged perihilar and left basilar opacities, likely pulmonary edema versus aspiration versus atelectasis. 2. Appropriate positioning of the support devices. I have personally reviewed the examination and initial interpretation and I agree with the findings. LEANA MOREIRA MD    Xr Chest Port 1 View    Result Date: 5/25/2020  EXAMINATION: XR CHEST PORT 1 VW, 5/25/2020 11:14 AM INDICATION: aspiration pneumonia COMPARISON: Chest x-ray 5/24/2020 FINDINGS: Single portable upright AP radiograph of the chest. ET tube, esophageal temperature probe, right PICC line and enteric tube are  stable in positioning. Trachea is midline. Cardiomediastinal silhouette is stable. Similar appearing hilar opacities. Interstitial and airspace opacities. No pleural effusion. No pneumothorax.     IMPRESSION: 1. Interstitial and airspace opacities throughout with prominent opacities in the michelle bilaterally. May represent pulmonary edema, aspiration, infection. 2. Stable support devices. I have personally reviewed the examination and initial interpretation and I agree with the findings. KEDAR STOCK MD    Xr Chest Port 1 View    Result Date: 5/24/2020  XR CHEST PORT 1 VW  5/24/2020 12:13 PM  HISTORY: ett placement COMPARISON: Earlier same day FINDINGS: Endotracheal tube tip projects over the mid to upper thoracic trachea. Esophageal temperature probe tip pulled back to the thoracic inlet. Other support devices are unchanged. Stable cardiac silhouette and pulmonary vasculature. Hazy perihilar and bibasilar opacities are unchanged. More focal opacity left lower lobe.     IMPRESSION: 1. Endotracheal tube tip projects over the mid to upper thoracic trachea. 2. Unchanged perihilar opacities. Favor to be edema. 3. Left lower lobe atelectasis. I have personally reviewed the examination and initial interpretation and I agree with the findings. MICHELLE HAMILTON MD    Xr Chest Port 1 View    Result Date: 5/24/2020  XR CHEST PORT 1 VW  5/24/2020 2:50 AM History:  Check endotracheal tube placement and ECLS cannula placement. DO NOT log-roll patient.  Place film under patient using patient safety handling process.. Comparison: Chest radiograph dated 5/23/2020 Findings: Semiupright AP chest radiograph. Endotracheal tube with the tip projects 4.1 cm above chaim. Stable right upper extremity PICC line, gastric tube and temperature probe. Cardiac silhouette is within normal limits. Slightly improved perihilar hazy opacities. No pneumothorax or significant pleural effusion.     IMPRESSION: 1.  Stable supportive lines and tubes. 2.   Interval slightly improved perihilar hazy opacities, likely representing pulmonary edema. I have personally reviewed the examination and initial interpretation and I agree with the findings. MICHELLE HAMILTON MD    Xr Chest Port 1 View    Result Date: 5/23/2020  XR CHEST PORT 1 VW  5/23/2020 2:19 AM History:  Check endotracheal tube placement and ECLS cannula placement. DO NOT log-roll patient.  Place film under patient using patient safety handling process.. Comparison: Chest radiograph dated 5/22/2020 Findings: Semiupright AP chest radiograph. Endotracheal tube with the tip projects at 3.5 cm above chaim. Stable right upper and the PICC line, gastric tube and temperature probe. Cardiac silhouette is within normal limits. Improved bilateral perihilar hazy opacities. Pulmonary vasculature is mildly indistinct. No pneumothorax or significant pleural effusion.     IMPRESSION: 1.  Stable supportive lines and tubes. 2.  Interval decreased perihilar hazy opacities, likely representing improved pulmonary edema. I have personally reviewed the examination and initial interpretation and I agree with the findings. KEDAR STOCK MD    Xr Chest Port 1 View    Result Date: 5/22/2020  EXAM: XR CHEST PORT 1 VW  5/22/2020 8:30 AM  HISTORY: Hypoxia. COMPARISON: Chest x-ray from 5/22/2020 (1:26 AM) and 5/21/2020 FINDINGS: Semiupright portable AP radiograph of the chest. Right upper extremity PICC with tip projecting over the cavoatrial junction. The enteric tube courses inferior to the diaphragm and out of the field of view. Tip of the temperature probe projects over the esophagus. The tip of the endotracheal tube is approximately 2 cm above the chaim. Trachea is midline. The cartilage silhouette is unchanged. Stable perihilar and basilar patchy opacities. No pleural effusion or pneumothorax. No acute osseous or abdominal abnormality.     IMPRESSION: 1. Stable perihilar and basilar patchy opacities, likely pulmonary edema versus  infection. 2. The tip of the endotracheal tube is approximately 2 cm above the chaim. Stable positioning of the remaining support devices. I have personally reviewed the examination and initial interpretation and I agree with the findings. LEANA MOREIRA MD    Xr Chest Port 1 View    Result Date: 2020  XR CHEST PORT 1 VW  2020 1:26 AM History:  Check endotracheal tube placement and ECLS cannula placement. DO NOT log-roll patient.  Place film under patient using patient safety handling process.  39 year old male who was admitted on 2020 for cardiac arrest. Comparison: Chest radiograph dated 2020 Findings: Semiupright AP chest radiograph. Endotracheal tube with tip projects 3.9 cm above chaim. Stable right upper extremity PICC line, temperature probe, and gastric tube. Cardiac silhouette is within normal limits. No substantial change in perihilar hazy opacities. No pneumothorax or significant pleural effusion.     IMPRESSION: 1.  Stable supportive lines and tubes. 2.  Unchanged perihilar hazy opacities, which may represent pulmonary edema versus atelectasis/consolidation. I have personally reviewed the examination and initial interpretation and I agree with the findings. REYNALDO ROY MD    Xr Abdomen Port 1 View    Result Date: 2020  EXAMINATION: XR ABDOMEN PORT 1 VW, 2020 12:29 PM COMPARISON: CT 2020 HISTORY: feeding tube placement FINDINGS: Feeding tube at the ligament of Treitz. NG/OG tube overlies the stomach. No air-filled dilated small bowel loops.     IMPRESSION: Feeding tube at the ligament of Treitz with NG/OG tube overlying the stomach. DARWIN BRUNO MD    Echo Limited    Result Date: 2020  [id number]^EARLE^CAITLYN^M    [facility] [address] [place] [id number]  Name: FAINA FORRESTER MRN: [id number] : 1980 Study Date: 2020 01:22 PM Age: 39 yrs  Gender: Male Patient Location: [facility] Reason For Study: Cardiac Arrest Ordering Physician: CAITLYN OG Performed By: Denisse Johnson RDCS  BSA: 2.0 m2 Height: 66 in Weight: 208 lb HR: 125 BP: 128/85 mmHg _____________________________________________________________________________ __   Procedure Limited Portable Echo Adult. Contrast Optison. Technically difficult study. Optison (NDC #5073-2664-23) given intravenously. Patient was given 5 ml mixture of 3 ml Optison and 6 ml saline. 4 ml wasted. _____________________________________________________________________________ __   Interpretation Summary Limited study to evaluate biventricular function.  Left ventricular size is normal. The Ejection Fraction is estimated at 35-40%. The mid to distal anterolateral wall, the apex and distal septum is akinetic consistent with LAD territory injury. Right ventricular function, chamber size, wall motion, and thickness are normal. No pericardial effusion is present.  Compared to prior study EF appears grossly similar. _____________________________________________________________________________ __   Left Ventricle Left ventricular size is normal. Left ventricular wall thickness is normal. The Ejection Fraction is estimated at 35-40%. The mid to distal anterolateral wall, the apex and distal septum is akinetic consistent with LAD territory injury.  Right Ventricle Right ventricular function, chamber size, wall motion, and thickness are normal.  Atria The atria cannot be assessed.  Mitral Valve The mitral valve is normal.   Aortic Valve The aortic valve cannot be assessed.  Tricuspid Valve The tricuspid valve cannot be assessed.  Pulmonic Valve The pulmonic valve cannot be assessed.  Vessels The inferior vena cava cannot be assessed.  Pericardium No pericardial effusion is present.   Compared to Previous Study Compared to prior study EF appears grossly similar.  _____________________________________________________________________________ __         Report approved by: Ashlee MILLS 05/26/2020 02:19 PM     _____________________________________________________________________________ __      Ct Head W/o Contrast    Result Date: 5/25/2020  CT HEAD W/O CONTRAST 5/25/2020 4:42 PM Provided History: s/p cardiac arrest Comparison: Head CT 5/17/2020 Technique: Using multidetector thin collimation helical acquisition technique, axial, coronal and sagittal CT images from the skull base to the vertex were obtained without intravenous contrast. Findings: Multiple regions of abnormal hypoattenuation with loss of gray-white differentiation involving both cerebral hemispheres, most conspicuous in the left cerebral hemisphere (for instance series 3; images 26 and 30 ). Also questionable diffuse loss of gray-white matter differentiation throughout both cerebral hemispheres., Although images are mildly degraded by artifact related to multiple extracranial leads. No intracranial hemorrhage, mass effect, or midline shift. The ventricles are proportionate to the cerebral sulci. The basal cisterns are patent. Frontal sinus mucosal hypertrophy. Fluid and mucosal hypertrophy within the ethmoid and sphenoid sinuses. Mucosal hypertrophy of the maxillary sinuses, right greater than left. Mild bilateral mastoid air cell effusions. Partial visualization of endotracheal tube and enteric tube.     Impression: 1. Multifocal acute/subacute cerebral infarcts in addition to probable diffuse loss of gray-white matter differentiation which can be seen in diffuse hypoxic injury. 2. New mastoid effusions and paranasal sinus fluid and mucosal hypertrophy, nonspecific in the setting of intubation. I have personally reviewed the examination and initial interpretation and I agree with the findings. TASHA JACKSON MD

## 2020-05-28 NOTE — PROGRESS NOTES
Cardiology Progress Note  Scot Bishop MRN: [id number]  Age: 39 year old, : 1980  Date: 2020            Assessment and Plan:     Scot Bishop is a 39 year old male who was admitted on 2020 for cardiac arrest. Pt reportedly had chest pain that started on 20. He was using Drano on his pipes at home and did not initially seek medical attention for CP and SOB since it said on the box that Drano can cause those symptoms. He took a shower and had syncopal episode after coming out of the shower. EMS was called; initial rhythm asystole. With chest compressions pt eventually had PEA and then eventually had VF in the field. After multiple cycles of epinephrine had ROSC. He was intubated in the field.   Pt was brought to [facility] ED where he regained a pulse and was brought to the Cath Lab for coronary angiogram. Initially, BP was 90s/60s as he was being transported from ED but he had recurrent cardiac arrest on arrival to Cath Lab. ECG showed ST elevation in aVR. He was promptly placed on VA ECMO. He had thrombotic occlusion of proximal LAD and underwent successful aspiration thrombectomy and PCI w/ NAZIA of proximal and mid LAD.     Interval events: reportedly had LUE posturing w/ rhythmic tremors for ~30 seconds overnight. Evaluated by Neuro; per their recs may use ativan as needed and plan to restart EEG if this happens again. Moving all extremities and opening eyes although not following commands. ABG @ this AM: 7.49/34/61 - PEEP increase to 7 and FiO2 increased to 40% w/ improvement in ABG. Pressure supporting this AM.      Today's plan:   -PST as tolerated   -diuresis   -monitor neurological status     Neurology: Intubated. Initial CT head negative. Cooled to 34 degrees on arrival; rewarmed  AM. EEG  showed severe diffuse encephalopathy without seizures or epileptiform discharges.  Repeat CTH : multifocal acute/subacute cerebral infarcts   Brain MRI :  diffuse acute/subacute infarcts in bilat cerebral hemispheres, corpus callosum, and periventricular white matter c/w evolving diffuse hypoxic ischemic brain injury   Episode of LUE posturing and rhythmic tremors last night.   -off sedation; fentanyl bumps and PO oxycodone as needed for sign of pain   -seroquel 25 mg PM     -Neurocrit following; appreciate their recs    Cardiovascular / Hemodynamics: Refractory VF arrest    S/p NAZIA to proximal-mid LAD  Sinus tachycardia   Peripheral VA ECMO inserted for cardiac arrest. LA 16 initially. Suspect ongoing tachycardia d/t evolving MI and post-arrest state. ECMO decannulation at bedside on 5/19; IABP discontinued 5/20. 23 second run on VT overnight on 5/27.   TTE 5/26/20: EF 35-40%, RV normal   EKG 5/25: Sinus tachycardia, QTc 448.   -continue metoprolol tartrate 25 mg BID   -continue ASA 81mg daily and ticagrelor 90mg BID   -hold statin for now given likely hepatic injury during arrest  -hold ACE/ARB for now given likely reduced renal fxn after arrest   Pulmonary: Acute hypoxic respiratory failure  COVID negative  Admission CT chest showed bilateral consolidations c/w aspiration PNA. Had thick secretions that required bagging by RT on 5/20. Bronchoscopy 5/21. Increasing oxygen requirements and pt-vent dyssynchrony on 5/22. Pulmonary re-consulted, infectious work up, pt sedated. Significant pressure injury of tongue w/ splitting of tongue noted by [facility] nurse today. No active bleeding.    Vent settings this AM: 16/500/7/40%   ABG on these settings: 7.47/36/141/26   CXR 5/28: pulmonary edema, slightly improved. Lines in stable position.   -ENT consult 5/27 for pressure injury of tongue   -PST as tolerated    -continue mucomyst and albuterol nebs  -added hypertonic saline nebs per pulm   -growing GNR in sputum 5/22; continue meropenem (5/22 - 6/5)     -wean vent and PST as able  -daily CXR  -Q12h VBGs and PRN   -consider scheduled duonebs if signs of lung dz, currently PRN      "  GI and Nutrition: Shock liver  GIB, resolved    -> 242,  -> 113. T bili 1.0. Restarted TF.   -post-pyloric FT placement 5/26  -monitor LFTs  -continue TF   -bowel regimen   -GI Prophylaxis: Protonix BID for UGIB   Renal, Fluid and Electrolytes: Acute kidney injury   Cr improved to 0.93 this AM. 3.4L UOP and net positive 421 mL yesterday after lasix 40 mg IV x 1. Na 150 today.   - mL q 1 hr   -lasix 40 mg IV this AM  -monitor I/O  -maintain K>3.8 and Mg>2    Infectious Disease: Aspiration pneumonia  Leukocytosis  WBC 14.9, tmax 100.6F overnight. Blood cultures negative thus far. Grew GNR, acinetobacter (not baumannii) in sputum cx. Blood and sputum cx from yesterday unchanged.   -vancomycin/zosyn x5 days (5/17-5/22) for asp PNA   -meropenem through 6/5 for GNR in sputum    -monitor for signs of infection given lines and leukocytosis   Hematology and Oncology: Receiving ASA/ticagrelor for NAZIA. Transfused 1 unit PRBCs 5/21. Hgb 8.3 yesterday AM. No signs of active bleeding.   -recheck CBC   -transfuse for Hgb<7   -DVT PPX: subcutaneous heparin    Endocrinology: No known medical history. BG elevated.  -not requiring insulin gtt  -HgbA1c 5.2   Lines:       R radial arterial line May 17, 2020  ETT May 17, 2020  Silva catheter May 17, 2020  OG tube May 17, 2020  NJ tube May 26, 2020   Restraint: not currently needed    Current lines are required for patient management       Family update by me today: Yes     Code Status: Full code     The pt was discussed and evaluated with Dr. Matamoros, attending physician, who agrees with the assessment and plan above.     Kala Chiang, DNP APRN CNP          Objective     /73   Temp 100  F (37.8  C)   Resp 23   Ht 1.676 m (5' 6\")   Wt 93.8 kg (206 lb 12.7 oz)   SpO2 99%   BMI 33.38 kg/m    Temp:  [99.5  F (37.5  C)-101.1  F (38.4  C)] 100  F (37.8  C)  Heart Rate:  [103-140] 126  Resp:  [13-28] 23  BP: (108-121)/(58-73) 121/73  MAP:  [72 mmHg-108 " mmHg] 85 mmHg  Arterial Line BP: (102-182)/(56-86) 112/68  FiO2 (%):  [30 %] 30 %  SpO2:  [98 %-100 %] 99 %  Wt Readings from Last 2 Encounters:   05/28/20 93.8 kg (206 lb 12.7 oz)     I/O last 3 completed shifts:  In: 3823.23 [I.V.:773.23; NG/GT:2050]  Out: 3680 [Urine:3630; Emesis/NG output:50]      GENERAL APPEARANCE: Intubated. NAD.  HEENT: No icterus, PERRL 2 mm, ETT in place, NJ tube in place  CARDIOVASCULAR: sinus tachycardia, normal S1 and S2, no S3 or S4 and no murmur, click or rub. Normal PMI. Pulses dopplerable.  RESP: Coarse bilaterally. Mechanical ventilation.    GASTRO: Soft, bowel sounds hypoactive but present  GENITOURINARY: Silva in place.  EXTREMITIES: Warm, no edema, pulses dopplered.  NEURO: Intubated, Pupils equal and reactive, 2 mm. Moving extremities and opening eyes although not following commands.   INTEGUMENTARY: No rashes. Line sites CDI  LINES/TUBES/DRAINS: R radial Arterial line. ETT. OG. NJ. Silva Catheter.          Data:     Vent Settings:  Resp: 23 SpO2: 99 % O2 Device: Mechanical Ventilator      Arterial Blood Gas:   Recent Labs   Lab 05/28/20  0414 05/27/20  0312 05/26/20  0345 05/25/20  0448 05/24/20  0401   PH 7.49*  --  7.47* 7.45 7.41   PCO2 34*  --  33* 32* 33*   PO2 61*  --  125* 94 146*   HCO3 26  --  24 23 21   O2PER 30 30.0 40.0 40.0 40.0       Vitals:    05/27/20 0400 05/27/20 1840 05/28/20 0000   Weight: 94.9 kg (209 lb 3.5 oz) 93.4 kg (205 lb 14.6 oz) 93.8 kg (206 lb 12.7 oz)   I/O last 3 completed shifts:  In: 3823.23 [I.V.:773.23; NG/GT:2050]  Out: 3680 [Urine:3630; Emesis/NG output:50]  Recent Labs   Lab 05/28/20  0417 05/27/20  1225 05/27/20 0312 05/26/20  0911   NA  --   --  154* 152*   POTASSIUM 3.6 3.6 3.7 3.8   CHLORIDE  --   --  124* 122*   CO2  --   --  27 24   ANIONGAP  --   --  2* 6   GLC  --   --  113* 102*   BUN  --   --  36* 36*   CR  --   --  1.04 1.03   TEN  --   --  8.9 8.8     No components found for: URINE   Recent Labs   Lab 05/27/20 0312  205   * 119* 110*   * 252* 241*   BILITOTAL 1.0 1.1 1.1   ALBUMIN 2.8* 2.9* 2.8*   PROTTOTAL 7.5 7.5 7.0   ALKPHOS 114 113 102     Temp: 100  F (37.8  C) Temp src: BladderTemp  Min: 99.5  F (37.5  C)  Max: 101.1  F (38.4  C)   Recent Labs   Lab 20  0448   WBC 14.9* 16.6* 14.9* 16.3* 15.2*   HGB 8.3* 8.5* 7.7* 8.1* 8.0*   HCT 27.9* 28.1* 25.1* 26.2* 26.1*   * 100 101* 101* 100   RDW 16.1* 16.1* 15.9* 15.7* 15.6*    339 283 264 245     No lab results found in last 7 days.  Recent Labs   Lab 20  0448   * 102* 102* 104* 104*       All imaging personally reviewed:  Recent Results (from the past 24 hour(s))   Echocardiogram Complete    Narrative    [id number]  [id number]  [id number]  [id number]^GRIS^YOSHI           [facility],[place]  Echocardiography Laboratory  [address] [place] [id number]     Name: FAINA FORRESTER  MRN: [id number]  : 1980  Study Date: 2020 08:16 AM  Age: 39 yrs  Gender: Male  Patient Location: [facility]  Reason For Study: Cardiac Arrest  Ordering Physician: YOSHI LANDRY  Performed By: Denisse Johnson RDCS     BSA: 2.1 m2  Height: 66 in  Weight: 231 lb  BP: 132/58 mmHg  _____________________________________________________________________________  __        Procedure  Complete Portable Echo Adult. Technically difficult study.Extremely poor  acoustic windows.  _____________________________________________________________________________  __        Interpretation Summary  VA ECMO at 3.7L/min. Technically difficult study. Extremely poor acoustic  windows.  Severely (EF <30%) reduced left ventricular function on extremely limited  views.  The right ventricle cannot be assessed.  No pericardial effusion is present.  Previous study not available for  comparison.  _____________________________________________________________________________  __        Left Ventricle  Left ventricular wall thickness cannot evaluate. Left ventricular size is  normal. Severely (EF <30%) reduced left ventricular function is present. Left  ventricular diastolic function is not assessable. Severe diffuse hypokinesis  is present.     Right Ventricle  The right ventricle cannot be assessed. Right ventricular function cannot be  assessed due to poor image quality.     Mitral Valve  The mitral valve cannot be assessed.        Aortic Valve  The aortic valve opens with each cardiac cycle. On Doppler interrogation,  there is no significant stenosis or regurgitation.     Tricuspid Valve  The tricuspid valve cannot be assessed. Pulmonary artery systolic pressure  cannot be assessed.     Pulmonic Valve  The pulmonic valve cannot be assessed.     Vessels  The aorta root cannot be assessed. The thoracic aorta cannot be assessed.  Venous ECMO cannula is visualized traversing the IVC.     Pericardium  No pericardial effusion is present.     Compared to Previous Study  Previous study not available for comparison.     _____________________________________________________________________________  __                       Report approved by: Ashlee Izquierdo 05/18/2020 09:18 AM                 _____________________________________________________________________________  __                Medications     Current Facility-Administered Medications   Medication     0.9% sodium chloride BOLUS     0.9% sodium chloride BOLUS     acetaminophen (TYLENOL) tablet 650 mg    Or     acetaminophen (TYLENOL) Suppository 650 mg     acetylcysteine (MUCOMYST) 10 % nebulizer solution 4 mL     albuterol (PROVENTIL) neb solution 2.5 mg     artificial tears ophthalmic ointment     aspirin (ASA) chewable tablet 81 mg     calcium chloride in  mL intermittent infusion 1 g     calcium chloride injection 1 g      dexmedetomidine (PRECEDEX) 400 mcg in 0.9% sodium chloride 100 mL     dextrose 10% infusion     glucose gel 15-30 g    Or     dextrose 50 % injection 25-50 mL    Or     glucagon injection 1 mg     fentaNYL (PF) (SUBLIMAZE) injection 50 mcg     heparin ANTICOAGULANT injection 5,000 Units     heparin lock flush 10 UNIT/ML injection 2-5 mL     heparin lock flush 10 UNIT/ML injection 5-10 mL     heparin lock flush 10 UNIT/ML injection 5-10 mL     hydrALAZINE (APRESOLINE) injection 10 mg     ipratropium - albuterol 0.5 mg/2.5 mg/3 mL (DUONEB) neb solution 3 mL     levalbuterol (XOPENEX) neb solution 1.25 mg     levETIRAcetam (KEPPRA) intermittent infusion 1,000 mg     lidocaine (LMX4) cream     lidocaine (LMX4) cream     lidocaine (LMX4) cream     lidocaine 1 % 0.1-1 mL     lidocaine 1 % 0.1-1 mL     lidocaine 1 % 0.1-1 mL     lidocaine 1% with EPINEPHrine 1:100,000 30 mL, bupivacaine (MARCAINE) 30 mL     magnesium sulfate 2 g in water intermittent infusion     magnesium sulfate 4 g in 100 mL sterile water (premade)     meropenem (MERREM) 1 g vial to attach to  mL bag     metoprolol tartrate (LOPRESSOR) half-tab 25 mg     multivitamins w/minerals (CERTAVITE) liquid 15 mL     naloxone (NARCAN) injection 0.1-0.4 mg     oxyCODONE (ROXICODONE) tablet 5 mg     pantoprazole (PROTONIX) 2 mg/mL suspension 40 mg     potassium chloride (KLOR-CON) Packet 20-40 mEq     potassium chloride 10 mEq in 100 mL intermittent infusion with 10 mg lidocaine     potassium chloride 10 mEq in 100 mL sterile water intermittent infusion (premix)     potassium chloride 20 mEq in 50 mL intermittent infusion     potassium chloride ER (KLOR-CON M) CR tablet 20-40 mEq     QUEtiapine (SEROquel) tablet 25 mg     senna-docusate (SENOKOT-S/PERICOLACE) 8.6-50 MG per tablet 1 tablet     sodium chloride (NEBUSAL) 3 % neb solution 3 mL     sodium chloride (PF) 0.9% PF flush 10 mL     sodium chloride (PF) 0.9% PF flush 10-20 mL     sodium chloride (PF)  0.9% PF flush 3 mL     sodium chloride (PF) 0.9% PF flush 3 mL     sodium chloride (PF) 0.9% PF flush 5-50 mL     sodium phosphate 10 mmol in D5W intermittent infusion     sodium phosphate 15 mmol in D5W intermittent infusion     sodium phosphate 20 mmol in D5W intermittent infusion     sodium phosphate 25 mmol in D5W intermittent infusion     ticagrelor (BRILINTA) tablet 90 mg

## 2020-05-28 NOTE — PLAN OF CARE
Problem: Adult Inpatient Plan of Care  Goal: Plan of Care Review  Outcome: No Change  Flowsheets (Taken 5/28/2020 0400)  Plan of Care Reviewed With: patient     Major Shift Events:  Pt opens eyes spontaneously. PERRL 4. Roving eye movements at times. Spontaneously moves Left arm and withdraws in all other extremities. At 0355, pt appeared to be posturing in LUE with slight rhythmic tremors for about 30 sec.  VSS unchanged. CSI and Neurology came to evaluate. No new orders. Sinus tach 120's to 130's. Tmax 38.4. Cultures drawn. LS clear. Vent settings changed to CMV 40/500/16/7 for PO2 of 60. Repeat ABG improved. Thick creamy secretions. Extremely strong cough. ETT tube out 2cm. CSI notified and chest xray completed. No new orders given. TF at goal and  Q1. UOP good. Soft bite block not staying in place.  Oral care done Q2.  Plan: Possible EEG  For vital signs and complete assessments, please see documentation flowsheets.

## 2020-05-28 NOTE — PROGRESS NOTES
ENT Progress Note    Events  Per RN, soft gauze bite block fell out multiple times throughout the night.    Objective  Vital signs:  Temp: 100  F (37.8  C) Temp src: Bladder BP: 121/73   Heart Rate: 126 Resp: 23   General - adult male sedated, nonresponsive on mechanical ventilation  Oral cavity - tongue appears unchanged. Still ~2cm ulcer/pressure injury of anterior midline tongue. Distal ends somewhat forked.    Assessment  Scot Bishop is a 39 year old male who had an MI with asystole, PEA and V-fiib requiring advance resuscitative effort and intubation s/p ECMO and PCI who now has a 1.5 - 2cm pressure/erosive wound to his anterior central tongue likely from the ETT tube and ETT tube bite block which were resting directly on the area despite lateral movement of the tube on the head gear. This wound is not likely to be a malignancy given the time course and prior multiple photographs of his tongue (by WOCN) since admission.    His tongue appears about the same as yesterday. We attempted to place a bite block (from the OR) but the patient was clenching too firmly and would not tolerate it. Please continue the xeroform over the injury and move the ETT frequently to minimize pressure injury.    Plan  - Keep pressure off the tongue as much as possible. DO NOT allow ETT to rest directly on anterior tongue. Frequently move ETT from side to side to decrease tongue pressure.   - Place vaseline guaze or xeroform on anterior tongue wound at all times. ONLY PLACE ONE LONG PIECE AND ENSURE DISTAL END IS OUT OF THE MOUTH to prevent aspiration of guaze  - Peridex rinses QID  - Can place soft bite block between molars laterally (avoid placing anteriorly). Can use rolled up guaze and tape or obtain an actual bite block from OR/central supplies  - Early extubation/ trach if possible to decrease pressure on tongue  - We will await care conference on Friday to determine if surgical intervention will be necessary vs healing by  secondary intention    Storm [place] MD  PGY1 ENT    Patient discussed with Dr. Wilkins

## 2020-05-28 NOTE — PROGRESS NOTES
Care Coordinator Progress Note    Admission Date/Time:  5/17/2020  Attending MD:  Jessica, Cardiologist, *    Data  Chart reviewed, discussed with interdisciplinary team.   Patient was admitted for:    ST elevation MI (STEMI) (H)  Cardiac arrest (H)  Cardiogenic shock (H).     Assessment  Pt admitted on 5/17/2020 with out of hospital cardiac arrest requiring VA-ECMO support and decanulated 5/19.  Pt remain in ICU vented and sedated.  RNCC discussed the plan of care with the team.  The team have been updating pt parents daily and family is aware of pt status, the plan of care and they are in agreement with the plan.  The team feels family is well updated and all their questions have been addressed and no need for care conf. at this time. The team stated pt is with injury of the tongue and plan to consult ENT.  RNCC will cont to follow plan of care.     Plan  Anticipated Discharge Date:  TBD.  Anticipated Discharge Plan:  TBD.  RNCC will cont to follow plan of care.       Verito Ortiz RN, PHN, BSN  4A and 4E/ ICU  Care Coordinator  Phone: [phone]  Pager: [phone]

## 2020-05-28 NOTE — PROGRESS NOTES
Social Work: Assessment with Discharge Plan    Patient Name:  Scot Bishop  :  1980  Age:  39 year old  MRN:  [id number]  Risk/Complexity Score:  Filed Complexity Screen Score: 3  Completed assessment with:  Pt's father via phone    Presenting Information   Reason for Referral:  Discharge plan  Date of Intake:  May 28, 2020  Referral Source:  Chart Review/LOS  Decision Maker:  Pt at baseline, currently intubated  Alternate Decision Maker:  NOK - parents; Pt is not legally , has 2 minor children (ages 6 & 10), and has a sister and brother.   Health Care Directive:  Not appropriate at this time.   Living Situation:  House - lives alone  Previous Functional Status:  Independent  Patient and family understanding of hospitalization:  Cardiac arrest  Cultural/Language/Spiritual Considerations:  Pt is 38 yo english speaking male, single, who identifies of Shinto amalia.  Adjustment to Illness:  Not assessed with Pt. Pt's father discussed how difficult it has been for family not to be able to visit patient in the hospital up to this point.     Physical Health  Reason for Admission:    1. ST elevation MI (STEMI) (H)    2. Cardiac arrest (H)    3. Cardiogenic shock (H)      Services Needed/Recommended:  Other:  TBD    Mental Health/Chemical Dependency  Diagnosis:  Not identified  Support/Services in Place:  Not identified  Services Needed/Recommended:  Not identified    Support System  Significant relationship at present time:  Father (currently in [place])  Family of origin is available for support:  Has a sister and brother as well  Other support available:  Ex Sig other (mother of 2 minor children)  Gaps in support system:  Lives alone  Patient is caregiver to:  Child:  2 minor children partial custody w/ ex sig other (ages 6,10)     Provider Information   Primary Care Physician:  No Ref-Primary, Physician   None   Clinic:  No address on file      :  Not identified    Financial   Income Source:   Employed; no current insurance listed  Financial Concerns:  [facility] is working with Pt's family w/ anticipation of f/u 6/1 for a MNSure mckayla for possible MA under MNSure status and will assess for potentially qualifying for SMRT.   Insurance:    Payor/Plan Subscriber Name Rel Member # Group #       Discharge Plan   Patient and family discharge goal:  TBD  Provided education on discharge plan:  NO  Patient agreeable to discharge plan:  NA  A list of Medicare Certified Facilities was provided to the patient and/or family to encourage patient choice. Patient's choices for facility are:  NA  Will NH provide Skilled rehabilitation or complex medical:  NA  General information regarding anticipated insurance coverage and possible out of pocket cost was discussed. Patient and patient's family are aware patient may incur the cost of transportation to the facility, pending insurance payment: NA  Barriers to discharge:  Medical stability; discharge recommendations    Discharge Recommendations   Anticipated Disposition:  TBD    SW spoke w/ Pt's father to introduce self and case mgmt role. At this time, no appropriate discharge plans can be made pending medical stability. Case mgmt team will continue to follow for assistance.     ERMELINDA Rosen, ANN-MARIE  7C/7A Medicine Float Unit   Phone: [phone]  Pager: [phone]

## 2020-05-28 NOTE — PROGRESS NOTES
INITIAL REPORT of Video-EEG Monitoring              DATE OF RECORDIN2020         I reviewed the first 1 hour of video-EEG monitoring of Scot Bishop.         The EEG during coma was abnormal due to generalized delta-theta slowing, with faster alpha-beta activities of lower amplitude bilaterally.  No interictal epileptiform abnormalities and no electrographic seizures were observed.         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  This recording excludes non-convulsive status epilepticus as a possible cause of this encephalopathy.    Screening for intermittent seizures will require a longer period of recording.   German John M.D., [facility] [phone]

## 2020-05-28 NOTE — PROGRESS NOTES
Neuroscience Intensive Care Progress Note  2020    REASON FOR CRITICAL CARE ADMISSION: Cardiac arrest     ADMITTING PHYSICIAN: CSI     Date of Service (when I saw the patient): 20    ASSESSMENT: Scot Bishop is a 39 year old male admitted on 2020 with a PMH who was admitted for cardiac arrest.     Patient had been complaining of chest pain while home but did not seek medical attention right away. While coming out of the shower he collapsed. EMS was then called and his initial rhythm was asystole, however, after starting CPR he had PEA then eventually VF. After multiple doses of epinephrine and CPR ROSC was obtained. He was intubated in the field. Upon arrival to [facility] he was taken emergently to the cath lab for intervention. While in the cath lab he arrested again and was ultimately placed on [facility] ECMO. After he underwent successful aspiration thrombectomy of the proximal LAD.       24 Hour Vital Signs Summary:  Temperatures:  Current - Temp: 101.1  F (38.4  C); Max - Temp  Av.5  F (38.1  C)  Min: 99.5  F (37.5  C)  Max: 101.1  F (38.4  C)  Respiration range: Resp  Av.8  Min: 13  Max: 24  Pulse range: No data recorded  Blood pressure range: Systolic (24hrs), Av , Min:115 , Max:121   ; Diastolic (24hrs), Av, Min:70, Max:73    Pulse oximetry range: SpO2  Av.2 %  Min: 97 %  Max: 100 %    Ventilator Settings  Ventilation Mode: CPAP/PS  (Continuous positive airway pressure with Pressure Support)  FiO2 (%): 30 %  Rate Set (breaths/minute): 16 breaths/min  Tidal Volume Set (mL): 500 mL  PEEP (cm H2O): 7 cmH2O  Pressure Support (cm H2O): 7 cmH2O  Oxygen Concentration (%): 40 %  Resp: 22        Intake/Output Summary (Last 24 hours) at 2020 1415  Last data filed at 2020 1400  Gross per 24 hour   Intake 3771.23 ml   Output 2625 ml   Net 1146.23 ml       Arterial Line BP: (102-131)/(56-85) 117/75  MAP:  [72 mmHg-101 mmHg] 90 mmHg  BP - Mean:  [88] 88    Current Medications:     "acetylcysteine  4 mL Nebulization Q4H     aspirin  81 mg Per Feeding Tube Daily     heparin ANTICOAGULANT  5,000 Units Subcutaneous Q8H GABRIELLE     heparin lock flush  5-10 mL Intracatheter Q24H     levalbuterol  1.25 mg Nebulization Q4H     levETIRAcetam  1,000 mg Intravenous Q12H     meropenem  1 g Intravenous Q8H     metoprolol tartrate  25 mg Oral BID     multivitamins w/minerals  15 mL Per Feeding Tube Daily     pantoprazole  40 mg Oral or Feeding Tube BID     QUEtiapine  25 mg Oral or Feeding Tube QPM     senna-docusate  1 tablet Oral or Feeding Tube BID     sodium chloride (PF)  10 mL Intravenous Once     sodium chloride (PF)  3 mL Intracatheter Q8H     ticagrelor  90 mg Oral or Feeding Tube BID       PRN Medications:  sodium chloride 0.9%, sodium chloride 0.9%, acetaminophen **OR** acetaminophen, albuterol, artificial tears, calcium chloride, calcium chloride, dextrose, glucose **OR** dextrose **OR** glucagon, fentaNYL, heparin lock flush, heparin lock flush, hydrALAZINE, ipratropium - albuterol 0.5 mg/2.5 mg/3 mL, lidocaine 4%, lidocaine 4%, lidocaine 4%, lidocaine (buffered or not buffered), lidocaine (buffered or not buffered), lidocaine (buffered or not buffered), lidocaine 1% /EPI 1:[id number] 30  mL + bupivacaine 0.25% 30 mL, magnesium sulfate, magnesium sulfate, midazolam, naloxone, oxyCODONE, potassium chloride, potassium chloride with lidocaine, potassium chloride, potassium chloride, potassium chloride, sodium chloride, sodium chloride (PF), sodium chloride (PF), sodium chloride (PF), sodium phosphate, sodium phosphate, sodium phosphate, sodium phosphate    Infusions:    dexmedetomidine Stopped (05/26/20 0900)     dextrose         No Known Allergies    Physical Examination:  Vitals: /70   Temp 101.1  F (38.4  C)   Resp 22   Ht 1.676 m (5' 6\")   Wt 93.8 kg (206 lb 12.7 oz)   SpO2 99%   BMI 33.38 kg/m    EXAM: Examined off sedation   - Spontaneous eye opening, does not track staff or follow " commands  - Roving eye movements  - Pupils symmetric, +2mm, and reactive bilaterally  - Corneal response intact  - No clear facial asymmetry  - Cough and gag present with deep suctioning  - Briskly withdraws to noxious stimuli x 4 extremities, occasionally moving LUE       Labs/Studies:  Recent Labs   Lab Test 20  04220  1225 20  03120  1751  20  0345   *  --   --  154* 152*  --  152*   POTASSIUM 3.7 3.6 3.6 3.7 3.8   < > 3.6   CHLORIDE 118*  --   --  124* 122*  --  123*   CO2 25  --   --  27 24  --  23   ANIONGAP 6  --   --  2* 6  --  6   *  --   --  113* 102*  --  102*   BUN 31*  --   --  36* 36*  --  42*   CR 0.93  --   --  1.04 1.03  --  1.08   TEN 8.9  --   --  8.9 8.8  --  8.6   WBC  --   --   --  14.9* 16.6*  --  14.9*   RBC  --   --   --  2.76* 2.80*  --  2.48*   HGB  --   --   --  8.3* 8.5*  --  7.7*   PLT  --   --   --  348 339  --  283    < > = values in this interval not displayed.       Recent Labs   Lab Test 20  0437 20  0356 20  2205 20  1550   INR 1.19* 1.29* 1.24* 1.25*   PTT  --  30 36 69*       Recent Labs   Lab 20  0605 20  0414 20  03120  0345 20  0448   PH 7.47* 7.49*  --  7.47* 7.45   PCO2 36 34*  --  33* 32*   PO2 141* 61*  --  125* 94   HCO3 26 26  --  24 23   O2PER 40 30 30.0 40.0 40.0         Imagin. MR Brain w/o contrast on 20 at 1709  Impression:  1.  Diffuse acute/subacute infarcts in bilateral cerebral hemispheres,  corpus callosum and periventricular white matter, consistent with  evolving diffuse hypoxic ischemic brain injury.  2.  Diffuse microhemorrhage involving infra and supratentorial brain,  likely ECMO related.    2. CT Head w/o contrast on 20 at 1642  Impression:   1. Multifocal acute/subacute cerebral infarcts in addition to probable  diffuse loss of gray-white matter differentiation which can be seen in  diffuse hypoxic injury.  2. New mastoid  effusions and paranasal sinus fluid and mucosal  hypertrophy, nonspecific in the setting of intubation.     3. CT Head w/o contrast on 5/17/20 at 1557  Impression:   1. No acute intracranial pathology.       Assessment/Plan  Scot Bishop is a 39 year old admitted on 5/17/2020 with cardiac arrest s/p VA ECMO--decannulated on 5/19     Pt able to open eyes to voice, however, does not track or follow commands. Spontaneous movement noted in LUE only, does not withdraw to noxious stimuli in RUE, and bilateral lower extremities. Uncertain on prognostication or recovery from his hypoxic brain injury at this time as it is still too early from his cardiac event. He has some hepatic impairment and continues to receive Fentanyl 50 mcg PRN, Oxycodone q4h PRN, and most recently scheduled Quetiapine that will contribute to his poor neurologic exam. He is also being treated for aspiration PNA with Meropenem which can also contribute to his encephalopathy.    Overnight patient was noted to have roving eye movements and rhythmic shaking of LUE. vEEG restarted per CSI, will follow and treat as needed.        Plan  Neuro:  #Cardiac arrest  #Diffuse hypoxic ischemic brain injury  #Multiple acute/subacute cerebral infarcts  #Multifocal encephalopathy    - Avoid CNS acting drugs  - Continue to treat underlying infection, and correct metabolic derangements as able       #Seizures x 3 on 5/23 ~ Resolved  #New event overnight 5/28 with roving eye movement and rhythmic shaking of LUE  - vEEG started per CSI - Will monitor and make any necessary changes as needed  - Continue Levetiracetam 1,000 mg q12h          The patient was seen and discussed with the attending, Dr. Méndez.    Sunita OCAMPO, CNP  NeuroCritical Care Nurse Practitioner  Pager: [phone]  Ascom: [phone] available M-F 0700 to 1500

## 2020-05-28 NOTE — PLAN OF CARE
Major Shift Events:  Neuros remain stable, ongoing intermittent posturing and restlessness without sedation. Non-purposeful head movement remains unchanged, making ETT stabilization difficult. EEG monitoring restarted. Remains on pressure support, O2 sats remain >90%, LS coarse. Harsh cough also contributing to difficulty stabilizing ETT. MAP >65 w/o intervention. Sinus tachycardia 120-160s. Silva intact, large UO with lasix. Tolerating TF.    Plan: Monitor neuro status, continue respiratory support, EEG monitoring, continue tube feeds. Ativan and versed PRN.    For vital signs and complete assessments, please see documentation flowsheets.

## 2020-05-29 ENCOUNTER — APPOINTMENT (OUTPATIENT)
Dept: NEUROLOGY | Facility: CLINIC | Age: 40
End: 2020-05-29
Attending: NURSE PRACTITIONER
Payer: MEDICAID

## 2020-05-29 ENCOUNTER — APPOINTMENT (OUTPATIENT)
Dept: OCCUPATIONAL THERAPY | Facility: CLINIC | Age: 40
End: 2020-05-29
Payer: MEDICAID

## 2020-05-29 LAB
ALBUMIN SERPL-MCNC: 2.9 G/DL (ref 3.4–5)
ALP SERPL-CCNC: 120 U/L (ref 40–150)
ALT SERPL W P-5'-P-CCNC: 177 U/L (ref 0–70)
ANION GAP SERPL CALCULATED.3IONS-SCNC: 6 MMOL/L (ref 3–14)
AST SERPL W P-5'-P-CCNC: 74 U/L (ref 0–45)
BASE EXCESS BLDA CALC-SCNC: 3.9 MMOL/L
BILIRUB SERPL-MCNC: 0.7 MG/DL (ref 0.2–1.3)
BUN SERPL-MCNC: 33 MG/DL (ref 7–30)
CALCIUM SERPL-MCNC: 8.7 MG/DL (ref 8.5–10.1)
CHLORIDE SERPL-SCNC: 111 MMOL/L (ref 94–109)
CO2 SERPL-SCNC: 27 MMOL/L (ref 20–32)
CREAT SERPL-MCNC: 0.93 MG/DL (ref 0.66–1.25)
ERYTHROCYTE [DISTWIDTH] IN BLOOD BY AUTOMATED COUNT: 15.6 % (ref 10–15)
GFR SERPL CREATININE-BSD FRML MDRD: >90 ML/MIN/{1.73_M2}
GLUCOSE BLDC GLUCOMTR-MCNC: 107 MG/DL (ref 70–99)
GLUCOSE BLDC GLUCOMTR-MCNC: 119 MG/DL (ref 70–99)
GLUCOSE BLDC GLUCOMTR-MCNC: 132 MG/DL (ref 70–99)
GLUCOSE SERPL-MCNC: 128 MG/DL (ref 70–99)
HCO3 BLD-SCNC: 27 MMOL/L (ref 21–28)
HCT VFR BLD AUTO: 27.9 % (ref 40–53)
HGB BLD-MCNC: 8.6 G/DL (ref 13.3–17.7)
INTERPRETATION ECG - MUSE: NORMAL
INTERPRETATION ECG - MUSE: NORMAL
LACTATE BLD-SCNC: 0.9 MMOL/L (ref 0.7–2)
MAGNESIUM SERPL-MCNC: 2.2 MG/DL (ref 1.6–2.3)
MCH RBC QN AUTO: 30.7 PG (ref 26.5–33)
MCHC RBC AUTO-ENTMCNC: 30.8 G/DL (ref 31.5–36.5)
MCV RBC AUTO: 100 FL (ref 78–100)
O2/TOTAL GAS SETTING VFR VENT: 35 %
PCO2 BLD: 35 MM HG (ref 35–45)
PH BLD: 7.5 PH (ref 7.35–7.45)
PHOSPHATE SERPL-MCNC: 3.4 MG/DL (ref 2.5–4.5)
PLATELET # BLD AUTO: 484 10E9/L (ref 150–450)
PO2 BLD: 137 MM HG (ref 80–105)
POTASSIUM SERPL-SCNC: 3.6 MMOL/L (ref 3.4–5.3)
PROT SERPL-MCNC: 7.6 G/DL (ref 6.8–8.8)
RBC # BLD AUTO: 2.8 10E12/L (ref 4.4–5.9)
SODIUM SERPL-SCNC: 144 MMOL/L (ref 133–144)
TROPONIN I SERPL-MCNC: 0.35 UG/L (ref 0–0.04)
WBC # BLD AUTO: 15.8 10E9/L (ref 4–11)

## 2020-05-29 PROCEDURE — G0463 HOSPITAL OUTPT CLINIC VISIT: HCPCS

## 2020-05-29 PROCEDURE — 25000125 ZZHC RX 250: Performed by: NURSE PRACTITIONER

## 2020-05-29 PROCEDURE — 00000146 ZZHCL STATISTIC GLUCOSE BY METER IP

## 2020-05-29 PROCEDURE — 82803 BLOOD GASES ANY COMBINATION: CPT

## 2020-05-29 PROCEDURE — 25000128 H RX IP 250 OP 636: Performed by: NURSE PRACTITIONER

## 2020-05-29 PROCEDURE — 25000128 H RX IP 250 OP 636: Performed by: STUDENT IN AN ORGANIZED HEALTH CARE EDUCATION/TRAINING PROGRAM

## 2020-05-29 PROCEDURE — 84484 ASSAY OF TROPONIN QUANT: CPT | Performed by: NURSE PRACTITIONER

## 2020-05-29 PROCEDURE — 27210437 ZZH NUTRITION PRODUCT SEMIELEM INTERMED LITER

## 2020-05-29 PROCEDURE — 93005 ELECTROCARDIOGRAM TRACING: CPT

## 2020-05-29 PROCEDURE — 83735 ASSAY OF MAGNESIUM: CPT | Performed by: NURSE PRACTITIONER

## 2020-05-29 PROCEDURE — 25000125 ZZHC RX 250

## 2020-05-29 PROCEDURE — 94003 VENT MGMT INPAT SUBQ DAY: CPT

## 2020-05-29 PROCEDURE — 80053 COMPREHEN METABOLIC PANEL: CPT | Performed by: NURSE PRACTITIONER

## 2020-05-29 PROCEDURE — 25000132 ZZH RX MED GY IP 250 OP 250 PS 637: Performed by: INTERNAL MEDICINE

## 2020-05-29 PROCEDURE — 25000132 ZZH RX MED GY IP 250 OP 250 PS 637

## 2020-05-29 PROCEDURE — 93010 ELECTROCARDIOGRAM REPORT: CPT | Performed by: INTERNAL MEDICINE

## 2020-05-29 PROCEDURE — 99291 CRITICAL CARE FIRST HOUR: CPT | Mod: 25 | Performed by: INTERNAL MEDICINE

## 2020-05-29 PROCEDURE — 97110 THERAPEUTIC EXERCISES: CPT | Mod: GO

## 2020-05-29 PROCEDURE — 40000014 ZZH STATISTIC ARTERIAL MONITORING DAILY

## 2020-05-29 PROCEDURE — 94640 AIRWAY INHALATION TREATMENT: CPT | Mod: 76

## 2020-05-29 PROCEDURE — 25000128 H RX IP 250 OP 636

## 2020-05-29 PROCEDURE — 84100 ASSAY OF PHOSPHORUS: CPT | Performed by: NURSE PRACTITIONER

## 2020-05-29 PROCEDURE — 36600 WITHDRAWAL OF ARTERIAL BLOOD: CPT

## 2020-05-29 PROCEDURE — 20000004 ZZH R&B ICU UMMC

## 2020-05-29 PROCEDURE — 95714 VEEG EA 12-26 HR UNMNTR: CPT

## 2020-05-29 PROCEDURE — 94640 AIRWAY INHALATION TREATMENT: CPT

## 2020-05-29 PROCEDURE — 25000132 ZZH RX MED GY IP 250 OP 250 PS 637: Performed by: NURSE PRACTITIONER

## 2020-05-29 PROCEDURE — 83605 ASSAY OF LACTIC ACID: CPT | Performed by: NURSE PRACTITIONER

## 2020-05-29 PROCEDURE — 25000132 ZZH RX MED GY IP 250 OP 250 PS 637: Performed by: STUDENT IN AN ORGANIZED HEALTH CARE EDUCATION/TRAINING PROGRAM

## 2020-05-29 PROCEDURE — 40000275 ZZH STATISTIC RCP TIME EA 10 MIN

## 2020-05-29 PROCEDURE — 99207 ZZC APP CREDIT; MD BILLING SHARED VISIT: CPT | Performed by: NURSE PRACTITIONER

## 2020-05-29 PROCEDURE — 85027 COMPLETE CBC AUTOMATED: CPT | Performed by: NURSE PRACTITIONER

## 2020-05-29 RX ORDER — AMPICILLIN AND SULBACTAM 2; 1 G/1; G/1
3 INJECTION, POWDER, FOR SOLUTION INTRAMUSCULAR; INTRAVENOUS EVERY 6 HOURS
Status: DISCONTINUED | OUTPATIENT
Start: 2020-05-29 | End: 2020-06-03

## 2020-05-29 RX ORDER — ACETAMINOPHEN 325 MG/1
650 TABLET ORAL EVERY 6 HOURS
Status: DISCONTINUED | OUTPATIENT
Start: 2020-05-29 | End: 2020-06-08

## 2020-05-29 RX ORDER — FUROSEMIDE 10 MG/ML
40 INJECTION INTRAMUSCULAR; INTRAVENOUS ONCE
Status: COMPLETED | OUTPATIENT
Start: 2020-05-29 | End: 2020-05-29

## 2020-05-29 RX ORDER — ACETAMINOPHEN 325 MG/1
650 TABLET ORAL EVERY 12 HOURS PRN
Status: DISCONTINUED | OUTPATIENT
Start: 2020-05-29 | End: 2020-05-29

## 2020-05-29 RX ADMIN — ACETAMINOPHEN 650 MG: 325 TABLET ORAL at 01:39

## 2020-05-29 RX ADMIN — Medication 40 MG: at 09:16

## 2020-05-29 RX ADMIN — LEVETIRACETAM 1000 MG: 10 INJECTION INTRAVENOUS at 11:43

## 2020-05-29 RX ADMIN — ACETAMINOPHEN 650 MG: 325 TABLET ORAL at 16:36

## 2020-05-29 RX ADMIN — TICAGRELOR 90 MG: 90 TABLET ORAL at 20:34

## 2020-05-29 RX ADMIN — HEPARIN SODIUM 5000 UNITS: 5000 INJECTION, SOLUTION INTRAVENOUS; SUBCUTANEOUS at 22:27

## 2020-05-29 RX ADMIN — AMPICILLIN SODIUM AND SULBACTAM SODIUM 3 G: 2; 1 INJECTION, POWDER, FOR SOLUTION INTRAMUSCULAR; INTRAVENOUS at 14:07

## 2020-05-29 RX ADMIN — ACETYLCYSTEINE 4 ML: 100 SOLUTION ORAL; RESPIRATORY (INHALATION) at 12:12

## 2020-05-29 RX ADMIN — OXYCODONE HYDROCHLORIDE 5 MG: 5 TABLET ORAL at 01:39

## 2020-05-29 RX ADMIN — OXYCODONE HYDROCHLORIDE 5 MG: 5 TABLET ORAL at 06:25

## 2020-05-29 RX ADMIN — LEVETIRACETAM 1000 MG: 10 INJECTION INTRAVENOUS at 22:27

## 2020-05-29 RX ADMIN — ACETYLCYSTEINE 4 ML: 100 SOLUTION ORAL; RESPIRATORY (INHALATION) at 15:32

## 2020-05-29 RX ADMIN — AMPICILLIN SODIUM AND SULBACTAM SODIUM 3 G: 2; 1 INJECTION, POWDER, FOR SOLUTION INTRAMUSCULAR; INTRAVENOUS at 20:33

## 2020-05-29 RX ADMIN — ACETYLCYSTEINE 4 ML: 100 SOLUTION ORAL; RESPIRATORY (INHALATION) at 07:50

## 2020-05-29 RX ADMIN — ACETYLCYSTEINE 4 ML: 100 SOLUTION ORAL; RESPIRATORY (INHALATION) at 04:22

## 2020-05-29 RX ADMIN — LEVALBUTEROL HYDROCHLORIDE 1.25 MG: 1.25 SOLUTION RESPIRATORY (INHALATION) at 07:50

## 2020-05-29 RX ADMIN — CHLORHEXIDINE GLUCONATE 0.12% ORAL RINSE 15 ML: 1.2 LIQUID ORAL at 11:43

## 2020-05-29 RX ADMIN — HEPARIN SODIUM 5000 UNITS: 5000 INJECTION, SOLUTION INTRAVENOUS; SUBCUTANEOUS at 13:17

## 2020-05-29 RX ADMIN — OXYCODONE HYDROCHLORIDE 5 MG: 5 TABLET ORAL at 11:43

## 2020-05-29 RX ADMIN — ACETAMINOPHEN 650 MG: 325 TABLET ORAL at 22:27

## 2020-05-29 RX ADMIN — MEROPENEM 1 G: 1 INJECTION, POWDER, FOR SOLUTION INTRAVENOUS at 00:36

## 2020-05-29 RX ADMIN — Medication 40 MG: at 20:34

## 2020-05-29 RX ADMIN — ACETAMINOPHEN 650 MG: 325 TABLET ORAL at 06:25

## 2020-05-29 RX ADMIN — FUROSEMIDE 40 MG: 10 INJECTION, SOLUTION INTRAMUSCULAR; INTRAVENOUS at 13:17

## 2020-05-29 RX ADMIN — QUETIAPINE FUMARATE 25 MG: 25 TABLET ORAL at 20:34

## 2020-05-29 RX ADMIN — ACETAMINOPHEN 650 MG: 325 TABLET ORAL at 11:43

## 2020-05-29 RX ADMIN — CHLORHEXIDINE GLUCONATE 0.12% ORAL RINSE 15 ML: 1.2 LIQUID ORAL at 16:34

## 2020-05-29 RX ADMIN — AMPICILLIN SODIUM AND SULBACTAM SODIUM 3 G: 2; 1 INJECTION, POWDER, FOR SOLUTION INTRAMUSCULAR; INTRAVENOUS at 09:14

## 2020-05-29 RX ADMIN — TICAGRELOR 90 MG: 90 TABLET ORAL at 09:16

## 2020-05-29 RX ADMIN — CHLORHEXIDINE GLUCONATE 0.12% ORAL RINSE 15 ML: 1.2 LIQUID ORAL at 09:14

## 2020-05-29 RX ADMIN — LEVALBUTEROL HYDROCHLORIDE 1.25 MG: 1.25 SOLUTION RESPIRATORY (INHALATION) at 04:22

## 2020-05-29 RX ADMIN — FENTANYL CITRATE 50 MCG: 50 INJECTION INTRAMUSCULAR; INTRAVENOUS at 03:22

## 2020-05-29 RX ADMIN — HEPARIN SODIUM 5000 UNITS: 5000 INJECTION, SOLUTION INTRAVENOUS; SUBCUTANEOUS at 06:25

## 2020-05-29 RX ADMIN — Medication 25 MG: at 09:16

## 2020-05-29 RX ADMIN — LEVALBUTEROL HYDROCHLORIDE 1.25 MG: 1.25 SOLUTION RESPIRATORY (INHALATION) at 15:32

## 2020-05-29 RX ADMIN — MULTIVITAMIN 15 ML: LIQUID ORAL at 09:14

## 2020-05-29 RX ADMIN — LEVALBUTEROL HYDROCHLORIDE 1.25 MG: 1.25 SOLUTION RESPIRATORY (INHALATION) at 12:12

## 2020-05-29 RX ADMIN — CHLORHEXIDINE GLUCONATE 0.12% ORAL RINSE 15 ML: 1.2 LIQUID ORAL at 20:34

## 2020-05-29 RX ADMIN — LEVALBUTEROL HYDROCHLORIDE 1.25 MG: 1.25 SOLUTION RESPIRATORY (INHALATION) at 19:47

## 2020-05-29 RX ADMIN — Medication 25 MG: at 20:34

## 2020-05-29 RX ADMIN — ASPIRIN 81 MG CHEWABLE TABLET 81 MG: 81 TABLET CHEWABLE at 09:14

## 2020-05-29 RX ADMIN — POTASSIUM CHLORIDE 20 MEQ: 1.5 POWDER, FOR SOLUTION ORAL at 06:25

## 2020-05-29 RX ADMIN — ACETYLCYSTEINE 4 ML: 100 SOLUTION ORAL; RESPIRATORY (INHALATION) at 19:47

## 2020-05-29 ASSESSMENT — ACTIVITIES OF DAILY LIVING (ADL)
ADLS_ACUITY_SCORE: 18

## 2020-05-29 ASSESSMENT — MIFFLIN-ST. JEOR: SCORE: 1746.75

## 2020-05-29 NOTE — PLAN OF CARE
Problem: Adult Inpatient Plan of Care  Goal: Plan of Care Review  Outcome: No Change     Major Shift Events:  Continuous EEG in place. Pt opens eyes spontaneously. PERRL 4. Roving eye movements at times. Spontaneously moves Left arm and withdraws in all other extremities. Seizure event button pressed once around  2015 for a sudden nd sustained increase in HR to 150's. CSI notified and versed given with improvement.   Sinus tach 120's to 130's. Tmax 38.7LS clear. Vent settings CMV 30/500/16/7. Thick creamy secretions. Extremely strong cough. TF at goal and  Q1. UOP good. Soft bite block not staying in place.  Oral care done Q2.  Plan: Monitor for seizures  For vital signs and complete assessments, please see documentation flowsheets.

## 2020-05-29 NOTE — PROGRESS NOTES
[facility] Nurse Inpatient Pressure Injury Assessment   Reason for consultation: Evaluate and treat tongue      ASSESSMENT  Pressure Injury: on left top of tongue, hospital acquired    This is a Medical Device Related Pressure Injury (MDRPI) due to ETT  Pressure Injury is Stage Mucosal   Contributing factor of the pressure injury: pressure, microclimate and immobility  Status: Significantly deteriorated now has split tongue 5/27. Stable since 5/27/2020.  Recommend provider order: ENT now following     TREATMENT PLAN  Left top of tongue mucosal pressure injury: Good oral hygiene and routine repositioning of ETT (ensure tongue is disengaged with every reposition), attempt to not reposition ETT on wound. Offload tubing with rolled towel.    ENT recommendations:   - Keep pressure off the tongue as much as possible. DO NOT allow ETT to rest directly on anterior tongue. Frequently move ETT from side to side to decrease tongue pressure.   - Place vaseline guaze or xeroform on anterior tongue wound at all times. ONLY PLACE ONE LONG PIECE AND ENSURE DISTAL END IS OUT OF THE MOUTH to prevent aspiration of guaze  - Peridex rinses QID  - Can place soft bite block between molars laterally (avoid placing anteriorly). Can use rolled up guaze and tape or obtain an actual bite block from OR/central supplies  - Early extubation/ trach if possible to decrease pressure on tongue  - We will await care conference on Friday to determine if surgical intervention will be necessary vs healing by secondary intention    Orders Updated  WO Nurse follow-up plan:twice weekly  Nursing to notify the Provider(s) and re-consult the [facility] Nurse if wound(s) deteriorates or new skin concern.    Patient History  According to provider note(s):  Scot Bishop is a 39 year old male who was admitted on 5/17/2020 for cardiac arrest. Pt reportedly had chest pain that started on 5/16/20. He was using Drano on his pipes at home and did not initially seek medical  attention for CP and SOB since it said on the box that Drano can cause those symptoms. He took a shower and had syncopal episode after coming out of the shower. EMS was called; initial rhythm asystole. With chest compressions pt eventually had PEA and then eventually had VF in the field. After multiple cycles of epinephrine had ROSC. He was intubated in the field.   Pt was brought to [facility] ED where he regained a pulse and was brought to the Cath Lab for coronary angiogram. Initially, BP was 90s/60s as he was being transported from ED but he had recurrent cardiac arrest on arrival to Cath Lab. ECG showed ST elevation in aVR. He was promptly placed on VA ECMO. He had thrombotic occlusion of proximal LAD and underwent successful aspiration thrombectomy and PCI w/ NAZIA of proximal and mid LAD.     Objective Data  Containment of urine/stool: Indwelling catheter    Current Diet/ Nutrition:  Orders Placed This Encounter      NPO for Medical/Clinical Reasons Except for: NPO but receiving Tube Feeding      Output:   I/O last 3 completed shifts:  In: 3919 [I.V.:659; NG/GT:2490]  Out: 3965 [Urine:3565; Emesis/NG output:400]    Risk Assessment:   Sensory Perception: 2-->very limited  Moisture: 3-->occasionally moist  Activity: 1-->bedfast  Mobility: 1-->completely immobile  Nutrition: 3-->adequate  Friction and Shear: 2-->potential problem  Burak Score: 12      Labs:   Recent Labs   Lab 05/29/20  0444  05/25/20  0448   ALBUMIN 2.9*   < > 2.8*   HGB 8.6*   < > 8.0*   WBC 15.8*   < > 15.2*   A1C  --   --  5.2    < > = values in this interval not displayed.       Physical Exam  Skin inspection: focused tongue    Wound Location:  Left top of tongue     5/22 5/22 5/27 5/29 tongue    Date of last Photo 5/29/20  Wound History: Nurse had thought WOC was already following this wound, however WOC had only noted bite wound previously on underside of tongue. On  5/22 patient had bite on right side of tongue  5/27 Patient's tongue is now split. RN notified WOC and WOC was coming for routine assessment. Updated team, recommended ENT involvement. Team was already coordinating a care conference for family. Updated nursing manager. Per RN, have been doing all of the interventions recommended including have been offloading ETT tubing with rolled towel.  Patient seen thrashing head side to side. Per RN this has been patient's baseline for the last couple days. Patient also has very strong cough and very strong bite.  WOC requested RT readjust ETT stabilizer to see if we can offload up any higher on tubing. RT, RN, and WOC applied new ETT stabilizer and no noticeable difference in offloading.  Measurements (length x width x depth, in cm) unable to measure how far back wound goes.   Wound Base:  Tongue split, also appears to have missing tissue, white tongue tissue across tip of tongue extending back 1.5 cm  Palpation of the wound bed: normal   Periwound skin: mucosa  Color: pale  Temperature: normal   Drainage:, none  Description of drainage: none  Odor: none  Pain: patient trashing head throughout visit unclear if any pain,       Interventions  Current support surface: Standard  Low air loss mattress  Current off-loading measures: Pillows  Repositioning aid: Pillows  Visual inspection of wound(s) completed   Tube Securement: ETT stabilizer  Wound Care: was done per plan of care.  Supplies: none  Educated provided: importance of repositioning, plan of care and wound progress  Education provided to: nurse  Discussed importance of:repositioning every 2 hours, off-loading pressure to wound and their role in pressure injury prevention  Discussed plan of care with Nurse, Nurse manager, RT, medical team, and WO manager     Monalisa Porter, RN, CWOCN

## 2020-05-29 NOTE — PROGRESS NOTES
Cardiology Progress Note  Scot Bishop MRN: [id number]  Age: 39 year old, : 1980  Date: 2020            Assessment and Plan:     Scot Bishop is a 39 year old male who was admitted on 2020 for cardiac arrest. Pt reportedly had chest pain that started on 20. He was using Drano on his pipes at home and did not initially seek medical attention for CP and SOB since it said on the box that Drano can cause those symptoms. He took a shower and had syncopal episode after coming out of the shower. EMS was called; initial rhythm asystole. With chest compressions pt eventually had PEA and then eventually had VF in the field. After multiple cycles of epinephrine had ROSC. He was intubated in the field.   Pt was brought to [facility] ED where he regained a pulse and was brought to the Cath Lab for coronary angiogram. Initially, BP was 90s/60s as he was being transported from ED but he had recurrent cardiac arrest on arrival to Cath Lab. ECG showed ST elevation in aVR. He was promptly placed on VA ECMO. He had thrombotic occlusion of proximal LAD and underwent successful aspiration thrombectomy and PCI w/ NAZIA of proximal and mid LAD.     Interval events: Pressure supported for several hours yesterday. Video EEG restarted for rhythmic left arm movements and possible LUE posturing. Receiving fentanyl and oxycodone PRN for signs of pain/agitation.     Today's plan:   -PST  -diuresis to keep even   -discontinue vEEG per Neuro   -change PRN acetaminophen to 650 mg q6hrs scheduled   -monitor LFTs daily    -care conference next Tuesday (will contact care coordinator on Monday to help arrange this)     Neurology: Intubated. Initial CT head negative. Cooled to 34 degrees on arrival; rewarmed  AM. EEG  showed severe diffuse encephalopathy without seizures or epileptiform discharges.  Repeat CTH : multifocal acute/subacute cerebral infarcts   Brain MRI : diffuse  acute/subacute infarcts in bilat cerebral hemispheres, corpus callosum, and periventricular white matter c/w evolving diffuse hypoxic ischemic brain injury   Episode of LUE posturing and rhythmic tremors overnight on 5/27. EEG restarted 5/28.  -EEG: moderate to severe encephalopathy, no seizures   -off sedation; fentanyl bumps and PO oxycodone as needed for signs of pain   -seroquel 25 mg PM     -Neurocrit following; appreciate their recs    Cardiovascular / Hemodynamics: Refractory VF arrest    S/p NAZIA to proximal-mid LAD  Sinus tachycardia   Peripheral VA ECMO inserted for cardiac arrest. LA 16 initially. Suspect ongoing tachycardia d/t evolving MI and post-arrest state. ECMO decannulation at bedside on 5/19; IABP discontinued 5/20. 23 second run on VT overnight on 5/27.   TTE 5/26/20: EF 35-40%, RV normal   EKG 5/29: sinus tachycardia, QTc 439.   -continue ASA 81mg daily and ticagrelor 90mg BID  -continue metoprolol tartrate 25 mg BID   -hold statin for now given likely hepatic injury during arrest  -hold ACE/ARB for now given likely reduced renal fxn after arrest   Pulmonary: Acute hypoxic respiratory failure  COVID negative  Admission CT chest showed bilateral consolidations c/w aspiration PNA. Had thick secretions that required bagging by RT on 5/20. Bronchoscopy 5/21. Increasing oxygen requirements and pt-vent dyssynchrony on 5/22. Pulmonary re-consulted, infectious work up, pt sedated. Significant pressure injury of tongue w/ splitting of tongue noted by [facility] nurse today. No active bleeding.    Vent settings this AM: 16/500/7/35%   ABG on these settings: 7.50/35/137/27   CXR 5/28: pulmonary edema, slightly improved. Lines in stable position.   -ENT consult 5/27 for pressure injury of tongue   -PST as tolerated; pressure supported for several hours yesterday     -continue mucomyst and albuterol nebs  -added hypertonic saline nebs per pulm   -growing GNR in sputum 5/22; continue meropenem (5/22 - 6/5)     -wean  "vent and PST as able  -daily CXR  -Q12h ABGs and PRN   -consider scheduled duonebs if signs of lung dz, currently PRN       GI and Nutrition: Shock liver  GIB, resolved    -> 177,  -> 74. T bili 0.7.    -post-pyloric FT placement 5/26  -monitor LFTs  -continue TF   -bowel regimen   -GI Prophylaxis: Protonix BID for UGIB   Renal, Fluid and Electrolytes: Acute kidney injury   Cr improved to 0.93 this AM. 3.5L UOP and net negative 331 mL yesterday after lasix 60 mg IV x 1. Na 144 today.   - mL q 1 hr   -lasix 40 mg IV this AM  -monitor I/O  -maintain K>3.8 and Mg>2    Infectious Disease: Aspiration pneumonia  Leukocytosis  WBC 15.8, tmax 101.5F overnight. Blood cultures negative thus far. Grew GNR, acinetobacter (not baumannii) in sputum cx. Blood and sputum cx from yesterday unchanged.   -vancomycin/zosyn x5 days (5/17-5/22) for asp PNA   -discontinued meropenem today and started unasyn  -unasyn through 6/5 for GNR in sputum    -monitor for signs of infection given lines and leukocytosis   Hematology and Oncology: Receiving ASA/ticagrelor for NAZIA. Transfused 1 unit PRBCs 5/21. Hgb 8.6 yesterday AM. No signs of active bleeding. Plts uptrending - 484 today.   -recheck CBC   -transfuse for Hgb<7   -DVT PPX: subcutaneous heparin    Endocrinology: No known medical history. BG elevated.  -not requiring insulin gtt  -HgbA1c 5.2   Lines:       R radial arterial line May 17, 2020  ETT May 17, 2020  Silva catheter May 17, 2020  OG tube May 17, 2020  NJ tube May 26, 2020   Restraint: not currently needed    Current lines are required for patient management       Family update by me today: Yes     Code Status: Full code     The pt was discussed and evaluated with Dr. Matamoros, attending physician, who agrees with the assessment and plan above.     Kala Chiang, DNP APRN CNP          Objective     /70   Temp 100.4  F (38  C)   Resp 24   Ht 1.676 m (5' 6\")   Wt 89.4 kg (197 lb 1.5 oz)   SpO2 100% "   BMI 31.81 kg/m    Temp:  [100.4  F (38  C)-102  F (38.9  C)] 100.4  F (38  C)  Heart Rate:  [110-144] 110  Resp:  [22-24] 24  BP: (115)/(70) 115/70  MAP:  [77 mmHg-106 mmHg] 106 mmHg  Arterial Line BP: (100-131)/() 119/100  FiO2 (%):  [30 %] 30 %  SpO2:  [99 %-100 %] 100 %  Wt Readings from Last 2 Encounters:   05/29/20 89.4 kg (197 lb 1.5 oz)     I/O last 3 completed shifts:  In: 3884 [I.V.:739; NG/GT:2375]  Out: 4120 [Urine:3520; Emesis/NG output:600]      GENERAL APPEARANCE: Intubated. NAD.  HEENT: No icterus, PERRL 2 mm, ETT in place, NJ tube in place  CARDIOVASCULAR: sinus tachycardia, normal S1 and S2, no S3 or S4 and no murmur, click or rub. Normal PMI. Pulses dopplerable.  RESP: Clear bilaterally. Mechanical ventilation.    GASTRO: Soft, bowel sounds hypoactive but present  GENITOURINARY: Silva in place.  EXTREMITIES: Warm, no edema, pulses dopplered.  NEURO: Intubated, Pupils equal and reactive, 2 mm. Moving extremities and opening eyes although not following commands. Withdraws to pain.    INTEGUMENTARY: No rashes. Line sites CDI. Bilaterally groin sites CDI; right groin ecchymotic, incision intact.   LINES/TUBES/DRAINS: R radial Arterial line. ETT. OG. NJ. Silva Catheter.          Data:     Vent Settings:  Resp: 24 SpO2: 100 % O2 Device: Mechanical Ventilator      Arterial Blood Gas:   Recent Labs   Lab 05/29/20  0431 05/28/20  0605 05/28/20  0414 05/27/20  0312 05/26/20  0345   PH 7.50* 7.47* 7.49*  --  7.47*   PCO2 35 36 34*  --  33*   PO2 137* 141* 61*  --  125*   HCO3 27 26 26  --  24   O2PER 35.0 40 30 30.0 40.0       Vitals:    05/27/20 1840 05/28/20 0000 05/29/20 0400   Weight: 93.4 kg (205 lb 14.6 oz) 93.8 kg (206 lb 12.7 oz) 89.4 kg (197 lb 1.5 oz)   I/O last 3 completed shifts:  In: 3884 [I.V.:739; NG/GT:2375]  Out: 4120 [Urine:3520; Emesis/NG output:600]  Recent Labs   Lab 05/29/20  0444 05/28/20  0422    150*   POTASSIUM 3.6 3.7   CHLORIDE 111* 118*   CO2 27 25   ANIONGAP 6 6    * 116*   BUN 33* 31*   CR 0.93 0.93   TEN 8.7 8.9     No components found for: URINE   Recent Labs   Lab 20  175   AST 74* 113* 119*   * 242* 252*   BILITOTAL 0.7 1.0 1.1   ALBUMIN 2.9* 2.8* 2.9*   PROTTOTAL 7.6 7.5 7.5   ALKPHOS 120 114 113     Temp: 100.4  F (38  C) Temp src: BladderTemp  Min: 100.4  F (38  C)  Max: 102  F (38.9  C)   Recent Labs   Lab 20  1640 20  0345   WBC 15.8* 16.6* 14.9* 16.6* 14.9*   HGB 8.6* 9.0* 8.3* 8.5* 7.7*   HCT 27.9* 29.9* 27.9* 28.1* 25.1*    100 101* 100 101*   RDW 15.6* 15.7* 16.1* 16.1* 15.9*   * 475* 348 339 283     No lab results found in last 7 days.  Recent Labs   Lab 20  0345   * 116* 113* 102* 102*       All imaging personally reviewed:  Recent Results (from the past 24 hour(s))   Echocardiogram Complete    Narrative    [id number]  [id number]  [id number]  [id number]^GRIS^YOSHI           [facility],[place]  Echocardiography Laboratory  [address] [place] [id number]     Name: FAINA FORRESTER  MRN: [id number]  : 1980  Study Date: 2020 08:16 AM  Age: 39 yrs  Gender: Male  Patient Location: [facility]  Reason For Study: Cardiac Arrest  Ordering Physician: YOSHI LANDRY  Performed By: Denisse Johnson RDCS     BSA: 2.1 m2  Height: 66 in  Weight: 231 lb  BP: 132/58 mmHg  _____________________________________________________________________________  __        Procedure  Complete Portable Echo Adult. Technically difficult study.Extremely poor  acoustic windows.  _____________________________________________________________________________  __        Interpretation Summary  VA ECMO at 3.7L/min. Technically difficult study. Extremely poor acoustic  windows.  Severely (EF <30%) reduced left ventricular function on extremely limited  views.  The right ventricle  cannot be assessed.  No pericardial effusion is present.  Previous study not available for comparison.  _____________________________________________________________________________  __        Left Ventricle  Left ventricular wall thickness cannot evaluate. Left ventricular size is  normal. Severely (EF <30%) reduced left ventricular function is present. Left  ventricular diastolic function is not assessable. Severe diffuse hypokinesis  is present.     Right Ventricle  The right ventricle cannot be assessed. Right ventricular function cannot be  assessed due to poor image quality.     Mitral Valve  The mitral valve cannot be assessed.        Aortic Valve  The aortic valve opens with each cardiac cycle. On Doppler interrogation,  there is no significant stenosis or regurgitation.     Tricuspid Valve  The tricuspid valve cannot be assessed. Pulmonary artery systolic pressure  cannot be assessed.     Pulmonic Valve  The pulmonic valve cannot be assessed.     Vessels  The aorta root cannot be assessed. The thoracic aorta cannot be assessed.  Venous ECMO cannula is visualized traversing the IVC.     Pericardium  No pericardial effusion is present.     Compared to Previous Study  Previous study not available for comparison.     _____________________________________________________________________________  __                       Report approved by: Ashlee Izquierdo 05/18/2020 09:18 AM                 _____________________________________________________________________________  __                Medications     Current Facility-Administered Medications   Medication     0.9% sodium chloride BOLUS     0.9% sodium chloride BOLUS     acetaminophen (TYLENOL) tablet 650 mg    Or     acetaminophen (TYLENOL) Suppository 650 mg     acetylcysteine (MUCOMYST) 10 % nebulizer solution 4 mL     albuterol (PROVENTIL) neb solution 2.5 mg     artificial tears ophthalmic ointment     aspirin (ASA) chewable tablet 81 mg     calcium  chloride in  mL intermittent infusion 1 g     calcium chloride injection 1 g     chlorhexidine (PERIDEX) 0.12 % solution 15 mL     dextrose 10% infusion     glucose gel 15-30 g    Or     dextrose 50 % injection 25-50 mL    Or     glucagon injection 1 mg     fentaNYL (PF) (SUBLIMAZE) injection 50 mcg     heparin ANTICOAGULANT injection 5,000 Units     heparin lock flush 10 UNIT/ML injection 2-5 mL     heparin lock flush 10 UNIT/ML injection 5-10 mL     heparin lock flush 10 UNIT/ML injection 5-10 mL     hydrALAZINE (APRESOLINE) injection 10 mg     ipratropium - albuterol 0.5 mg/2.5 mg/3 mL (DUONEB) neb solution 3 mL     levalbuterol (XOPENEX) neb solution 1.25 mg     levETIRAcetam (KEPPRA) intermittent infusion 1,000 mg     lidocaine (LMX4) cream     lidocaine (LMX4) cream     lidocaine (LMX4) cream     lidocaine 1 % 0.1-1 mL     lidocaine 1 % 0.1-1 mL     lidocaine 1 % 0.1-1 mL     lidocaine 1% with EPINEPHrine 1:100,000 30 mL, bupivacaine (MARCAINE) 30 mL     magnesium sulfate 2 g in water intermittent infusion     magnesium sulfate 4 g in 100 mL sterile water (premade)     meropenem (MERREM) 1 g vial to attach to  mL bag     metoprolol tartrate (LOPRESSOR) half-tab 25 mg     multivitamins w/minerals (CERTAVITE) liquid 15 mL     naloxone (NARCAN) injection 0.1-0.4 mg     oxyCODONE (ROXICODONE) tablet 5 mg     pantoprazole (PROTONIX) 2 mg/mL suspension 40 mg     potassium chloride (KLOR-CON) Packet 20-40 mEq     potassium chloride 10 mEq in 100 mL intermittent infusion with 10 mg lidocaine     potassium chloride 10 mEq in 100 mL sterile water intermittent infusion (premix)     potassium chloride 20 mEq in 50 mL intermittent infusion     potassium chloride ER (KLOR-CON M) CR tablet 20-40 mEq     QUEtiapine (SEROquel) tablet 25 mg     senna-docusate (SENOKOT-S/PERICOLACE) 8.6-50 MG per tablet 1 tablet     sodium chloride (NEBUSAL) 3 % neb solution 3 mL     sodium chloride (PF) 0.9% PF flush 10 mL      sodium chloride (PF) 0.9% PF flush 10-20 mL     sodium chloride (PF) 0.9% PF flush 3 mL     sodium chloride (PF) 0.9% PF flush 3 mL     sodium chloride (PF) 0.9% PF flush 5-50 mL     sodium phosphate 10 mmol in D5W intermittent infusion     sodium phosphate 15 mmol in D5W intermittent infusion     sodium phosphate 20 mmol in D5W intermittent infusion     sodium phosphate 25 mmol in D5W intermittent infusion     ticagrelor (BRILINTA) tablet 90 mg

## 2020-05-29 NOTE — PLAN OF CARE
Discharge Planner OT   Patient plan for discharge: not addressed   Current status: Pt not following commands, pt opens eyes but not tracking. Pt tolerated PROM to BUEs and LEs to maintain ROM and joint integrity for ADLs.   Barriers to return to prior living situation: medical status, cognitive dysfunction, deconditioning, level of assist   Recommendations for discharge: TCU when LTACH goals are met   Rationale for recommendations: Pt is below baseline and would benefit from continued skilled therapy to increase activity tolerance and independence with ADLs.        Entered by: Ludmila Mays 05/29/2020 11:26 AM

## 2020-05-29 NOTE — PROGRESS NOTES
Neuroscience Intensive Care Progress Note  2020    REASON FOR CRITICAL CARE ADMISSION: Cardiac arrest     ADMITTING PHYSICIAN: CSI     Date of Service (when I saw the patient): 20    ASSESSMENT: Scot Bishop is a 39 year old male admitted on 2020 with a PMH who was admitted for cardiac arrest.     Patient had been complaining of chest pain while home but did not seek medical attention right away. While coming out of the shower he collapsed. EMS was then called and his initial rhythm was asystole, however, after starting CPR he had PEA then eventually VF. After multiple doses of epinephrine and CPR ROSC was obtained. He was intubated in the field. Upon arrival to [facility] he was taken emergently to the cath lab for intervention. While in the cath lab he arrested again and was ultimately placed on VA ECMO. After he underwent successful aspiration thrombectomy of the proximal LAD.      24 Hour Vital Signs Summary:  Temperatures:  Current - Temp: 100.4  F (38  C); Max - Temp  Av.8  F (38.2  C)  Min: 99.9  F (37.7  C)  Max: 102  F (38.9  C)  Respiration range: Resp  Av.7  Min: 12  Max: 24  Pulse range: No data recorded  Blood pressure range: No data recorded.  ; No data recorded.    Pulse oximetry range: SpO2  Av.7 %  Min: 98 %  Max: 100 %    Ventilator Settings  Ventilation Mode: (S) CMV/AC  (Continuous Mandatory Ventilation/ Assist Control)  FiO2 (%): 30 %  Rate Set (breaths/minute): 16 breaths/min  Tidal Volume Set (mL): 500 mL  PEEP (cm H2O): 7 cmH2O  Pressure Support (cm H2O): 7 cmH2O  Oxygen Concentration (%): 30 %  Resp: 16        Intake/Output Summary (Last 24 hours) at 2020 1415  Last data filed at 2020 1400  Gross per 24 hour   Intake 4236 ml   Output 3765 ml   Net 471 ml       Arterial Line BP: ()/() 114/64  MAP:  [75 mmHg-106 mmHg] 81 mmHg    Current Medications:    acetylcysteine  4 mL Nebulization Q4H     ampicillin-sulbactam (UNASYN) IV  3 g Intravenous  "Q6H     aspirin  81 mg Per Feeding Tube Daily     chlorhexidine  15 mL Swish & Spit 4x Daily     heparin ANTICOAGULANT  5,000 Units Subcutaneous Q8H [facility]     heparin lock flush  5-10 mL Intracatheter Q24H     levalbuterol  1.25 mg Nebulization Q4H     levETIRAcetam  1,000 mg Intravenous Q12H     metoprolol tartrate  25 mg Oral BID     multivitamins w/minerals  15 mL Per Feeding Tube Daily     pantoprazole  40 mg Oral or Feeding Tube BID     QUEtiapine  25 mg Oral or Feeding Tube QPM     senna-docusate  1 tablet Oral or Feeding Tube BID     sodium chloride (PF)  3 mL Intracatheter Q8H     ticagrelor  90 mg Oral or Feeding Tube BID       PRN Medications:  sodium chloride 0.9%, sodium chloride 0.9%, acetaminophen **OR** acetaminophen, albuterol, artificial tears, calcium chloride, dextrose, glucose **OR** dextrose **OR** glucagon, fentaNYL, heparin lock flush, hydrALAZINE, ipratropium - albuterol 0.5 mg/2.5 mg/3 mL, lidocaine 4%, lidocaine (buffered or not buffered), magnesium sulfate, magnesium sulfate, naloxone, oxyCODONE, potassium chloride, potassium chloride with lidocaine, potassium chloride, potassium chloride, potassium chloride, sodium chloride, sodium chloride (PF), sodium chloride (PF), sodium phosphate, sodium phosphate, sodium phosphate, sodium phosphate    Infusions:    dextrose         No Known Allergies    Physical Examination:  Vitals: /70   Temp 100.4  F (38  C)   Resp 16   Ht 1.676 m (5' 6\")   Wt 89.4 kg (197 lb 1.5 oz)   SpO2 99%   BMI 31.81 kg/m    EXAM: Examined off sedation   - Spontaneous eye opening and to voice, does not track staff or follow commands  - Conjugate gaze  - Pupils symmetric, +4mm, and reactive bilaterally  - Corneal response intact  - No clear facial asymmetry  - Cough and gag present with deep suctioning  - Briskly withdraws to noxious stimuli x 4 extremities, occasionally moving LUE     Labs/Studies:  Recent Labs   Lab Test 05/29/20  0444 05/28/20  1640 " 20  04220  0312     --  150*  --   --  154*   POTASSIUM 3.6  --  3.7 3.6   < > 3.7   CHLORIDE 111*  --  118*  --   --  124*   CO2 27  --  25  --   --  27   ANIONGAP 6  --  6  --   --  2*   *  --  116*  --   --  113*   BUN 33*  --  31*  --   --  36*   CR 0.93  --  0.93  --   --  1.04   TEN 8.7  --  8.9  --   --  8.9   WBC 15.8* 16.6*  --   --   --  14.9*   RBC 2.80* 2.98*  --   --   --  2.76*   HGB 8.6* 9.0*  --   --   --  8.3*   * 475*  --   --   --  348    < > = values in this interval not displayed.       Recent Labs   Lab Test 20  0356 20  2205 20  1550   INR 1.19* 1.29* 1.24* 1.25*   PTT  --  30 36 69*       Recent Labs   Lab 20  04320  0605 20  0345   PH 7.50* 7.47* 7.49*  --  7.47*   PCO2 35 36 34*  --  33*   PO2 137* 141* 61*  --  125*   HCO3   --  24   O2PER 35.0 40 30 30.0 40.0         Imagin. MR Brain w/o contrast on 20 at 1709  Impression:  1.  Diffuse acute/subacute infarcts in bilateral cerebral hemispheres,  corpus callosum and periventricular white matter, consistent with  evolving diffuse hypoxic ischemic brain injury.  2.  Diffuse microhemorrhage involving infra and supratentorial brain,  likely ECMO related.     2. CT Head w/o contrast on 20 at 1642  Impression:   1. Multifocal acute/subacute cerebral infarcts in addition to probable  diffuse loss of gray-white matter differentiation which can be seen in  diffuse hypoxic injury.  2. New mastoid effusions and paranasal sinus fluid and mucosal  hypertrophy, nonspecific in the setting of intubation.     3. CT Head w/o contrast on 20 at 1557  Impression:   1. No acute intracranial pathology.       Assessment/Plan  Scot Bishop is a 39 year old admitted on 2020 with cardiac arrest s/p VA ECMO--decannulated on      Pt opening eyes to voice and repeated stimulation, however, does not  track or follow commands. Spontaneous movement noted in LUE only, briskly withdraws to noxious stimuli in RUE, and bilateral lower extremities. Uncertain on prognostication or recovery from his hypoxic brain injury at this time as it is still too early from his cardiac event. He has some hepatic impairment and continues to receive Fentanyl 50 mcg PRN, Oxycodone q4h PRN, and most recently scheduled Quetiapine that will contribute to his neurologic exam. He is also being treated for aspiration PNA with Meropenem which can also contribute to his encephalopathy.     No seizures noted per Epileptologist, vEEG more consistent with moderate-severe encephalopathy. Will stop vEEG and sign off.           Plan  Neuro:  #Cardiac arrest  #Diffuse hypoxic ischemic brain injury  #Multiple acute/subacute cerebral infarcts  #Multifocal encephalopathy    - Avoid CNS acting drugs  - Continue to treat underlying infection, and correct metabolic derangements as able  - NCC to sign off, available for questions as needed. Please call #[phone]     #Seizures x 3 on 5/23 ~ Resolved  #New event overnight 5/28 with roving eye movement and rhythmic shaking of LUE  - OK to stop vEEG (will do for you) - consistent with generalized slowing indicating moderate-severe electrographic encephalopathy, no seizures noted      - Continue Levetiracetam 1,000 mg q12h  - Patient to continue his AED through hospitalization and follow up with a neurologist outpatient          The patient was seen and discussed with the attending, Dr. Méndez.    Sunita OCAMPO CNP  NeuroCritical Care Nurse Practitioner  Pager: [phone]  Ascom: *[phone] available M-F [phone] to [phone]

## 2020-05-29 NOTE — PROGRESS NOTES
INTERIM REPORT of Video-EEG Monitoring              DATE OF RECORDIN2020         I reviewed the ongoing video-EEG monitoring of Scot Bishop.         The EEG during coma remains abnormal due to generalized delta-theta slowing, with faster alpha-beta activities of lower amplitude bilaterally.  No interictal epileptiform abnormalities and no electrographic seizures were observed.         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    German John M.D., [facility] [phone]

## 2020-05-30 ENCOUNTER — APPOINTMENT (OUTPATIENT)
Dept: NEUROLOGY | Facility: CLINIC | Age: 40
End: 2020-05-30
Attending: NURSE PRACTITIONER
Payer: MEDICAID

## 2020-05-30 LAB
ALBUMIN SERPL-MCNC: 2.9 G/DL (ref 3.4–5)
ALP SERPL-CCNC: 116 U/L (ref 40–150)
ALT SERPL W P-5'-P-CCNC: 138 U/L (ref 0–70)
ANION GAP SERPL CALCULATED.3IONS-SCNC: 5 MMOL/L (ref 3–14)
ANION GAP SERPL CALCULATED.3IONS-SCNC: 8 MMOL/L (ref 3–14)
AST SERPL W P-5'-P-CCNC: 62 U/L (ref 0–45)
BACTERIA SPEC CULT: ABNORMAL
BACTERIA SPEC CULT: ABNORMAL
BILIRUB SERPL-MCNC: 0.7 MG/DL (ref 0.2–1.3)
BUN SERPL-MCNC: 34 MG/DL (ref 7–30)
BUN SERPL-MCNC: 42 MG/DL (ref 7–30)
CALCIUM SERPL-MCNC: 8.6 MG/DL (ref 8.5–10.1)
CALCIUM SERPL-MCNC: 8.7 MG/DL (ref 8.5–10.1)
CHLORIDE SERPL-SCNC: 109 MMOL/L (ref 94–109)
CHLORIDE SERPL-SCNC: 109 MMOL/L (ref 94–109)
CO2 SERPL-SCNC: 25 MMOL/L (ref 20–32)
CO2 SERPL-SCNC: 25 MMOL/L (ref 20–32)
CREAT SERPL-MCNC: 0.84 MG/DL (ref 0.66–1.25)
CREAT SERPL-MCNC: 1.06 MG/DL (ref 0.66–1.25)
ERYTHROCYTE [DISTWIDTH] IN BLOOD BY AUTOMATED COUNT: 15.5 % (ref 10–15)
GFR SERPL CREATININE-BSD FRML MDRD: 87 ML/MIN/{1.73_M2}
GFR SERPL CREATININE-BSD FRML MDRD: >90 ML/MIN/{1.73_M2}
GLUCOSE BLDC GLUCOMTR-MCNC: 107 MG/DL (ref 70–99)
GLUCOSE BLDC GLUCOMTR-MCNC: 129 MG/DL (ref 70–99)
GLUCOSE SERPL-MCNC: 116 MG/DL (ref 70–99)
GLUCOSE SERPL-MCNC: 118 MG/DL (ref 70–99)
HCT VFR BLD AUTO: 28 % (ref 40–53)
HGB BLD-MCNC: 8.6 G/DL (ref 13.3–17.7)
LACTATE BLD-SCNC: 0.7 MMOL/L (ref 0.7–2)
MCH RBC QN AUTO: 30 PG (ref 26.5–33)
MCHC RBC AUTO-ENTMCNC: 30.7 G/DL (ref 31.5–36.5)
MCV RBC AUTO: 98 FL (ref 78–100)
PLATELET # BLD AUTO: 517 10E9/L (ref 150–450)
POTASSIUM SERPL-SCNC: 3.6 MMOL/L (ref 3.4–5.3)
POTASSIUM SERPL-SCNC: 4.1 MMOL/L (ref 3.4–5.3)
PROT SERPL-MCNC: 7.6 G/DL (ref 6.8–8.8)
RBC # BLD AUTO: 2.87 10E12/L (ref 4.4–5.9)
SODIUM SERPL-SCNC: 139 MMOL/L (ref 133–144)
SODIUM SERPL-SCNC: 143 MMOL/L (ref 133–144)
SPECIMEN SOURCE: ABNORMAL
TROPONIN I SERPL-MCNC: 0.22 UG/L (ref 0–0.04)
WBC # BLD AUTO: 16 10E9/L (ref 4–11)

## 2020-05-30 PROCEDURE — 94640 AIRWAY INHALATION TREATMENT: CPT

## 2020-05-30 PROCEDURE — 25000132 ZZH RX MED GY IP 250 OP 250 PS 637: Performed by: STUDENT IN AN ORGANIZED HEALTH CARE EDUCATION/TRAINING PROGRAM

## 2020-05-30 PROCEDURE — 25000132 ZZH RX MED GY IP 250 OP 250 PS 637: Performed by: INTERNAL MEDICINE

## 2020-05-30 PROCEDURE — 25000125 ZZHC RX 250

## 2020-05-30 PROCEDURE — 25000128 H RX IP 250 OP 636: Performed by: STUDENT IN AN ORGANIZED HEALTH CARE EDUCATION/TRAINING PROGRAM

## 2020-05-30 PROCEDURE — 40000014 ZZH STATISTIC ARTERIAL MONITORING DAILY

## 2020-05-30 PROCEDURE — 25000132 ZZH RX MED GY IP 250 OP 250 PS 637: Performed by: NURSE PRACTITIONER

## 2020-05-30 PROCEDURE — 95714 VEEG EA 12-26 HR UNMNTR: CPT

## 2020-05-30 PROCEDURE — 84484 ASSAY OF TROPONIN QUANT: CPT | Performed by: NURSE PRACTITIONER

## 2020-05-30 PROCEDURE — 27210437 ZZH NUTRITION PRODUCT SEMIELEM INTERMED LITER

## 2020-05-30 PROCEDURE — 94003 VENT MGMT INPAT SUBQ DAY: CPT

## 2020-05-30 PROCEDURE — 25000128 H RX IP 250 OP 636: Performed by: NURSE PRACTITIONER

## 2020-05-30 PROCEDURE — 20000004 ZZH R&B ICU UMMC

## 2020-05-30 PROCEDURE — 00000146 ZZHCL STATISTIC GLUCOSE BY METER IP

## 2020-05-30 PROCEDURE — 25000132 ZZH RX MED GY IP 250 OP 250 PS 637

## 2020-05-30 PROCEDURE — 40000275 ZZH STATISTIC RCP TIME EA 10 MIN

## 2020-05-30 PROCEDURE — 25000128 H RX IP 250 OP 636

## 2020-05-30 PROCEDURE — 80053 COMPREHEN METABOLIC PANEL: CPT | Performed by: NURSE PRACTITIONER

## 2020-05-30 PROCEDURE — 94640 AIRWAY INHALATION TREATMENT: CPT | Mod: 76

## 2020-05-30 PROCEDURE — 80048 BASIC METABOLIC PNL TOTAL CA: CPT | Performed by: STUDENT IN AN ORGANIZED HEALTH CARE EDUCATION/TRAINING PROGRAM

## 2020-05-30 PROCEDURE — 99291 CRITICAL CARE FIRST HOUR: CPT | Mod: GC | Performed by: INTERNAL MEDICINE

## 2020-05-30 PROCEDURE — 85027 COMPLETE CBC AUTOMATED: CPT | Performed by: NURSE PRACTITIONER

## 2020-05-30 PROCEDURE — 83605 ASSAY OF LACTIC ACID: CPT | Performed by: NURSE PRACTITIONER

## 2020-05-30 PROCEDURE — 25000125 ZZHC RX 250: Performed by: NURSE PRACTITIONER

## 2020-05-30 RX ORDER — MIDAZOLAM (PF) 1 MG/ML IN 0.9 % SODIUM CHLORIDE INTRAVENOUS SOLUTION
2 CONTINUOUS
Status: DISCONTINUED | OUTPATIENT
Start: 2020-05-30 | End: 2020-06-01 | Stop reason: CLARIF

## 2020-05-30 RX ORDER — LORAZEPAM 2 MG/ML
INJECTION INTRAMUSCULAR
Status: COMPLETED
Start: 2020-05-30 | End: 2020-05-30

## 2020-05-30 RX ORDER — LORAZEPAM 2 MG/ML
2 INJECTION INTRAMUSCULAR ONCE
Status: COMPLETED | OUTPATIENT
Start: 2020-05-30 | End: 2020-05-30

## 2020-05-30 RX ADMIN — LEVALBUTEROL HYDROCHLORIDE 1.25 MG: 1.25 SOLUTION RESPIRATORY (INHALATION) at 04:18

## 2020-05-30 RX ADMIN — ACETAMINOPHEN 650 MG: 325 TABLET ORAL at 22:36

## 2020-05-30 RX ADMIN — LEVALBUTEROL HYDROCHLORIDE 1.25 MG: 1.25 SOLUTION RESPIRATORY (INHALATION) at 00:36

## 2020-05-30 RX ADMIN — Medication 40 MG: at 20:18

## 2020-05-30 RX ADMIN — CHLORHEXIDINE GLUCONATE 0.12% ORAL RINSE 15 ML: 1.2 LIQUID ORAL at 08:55

## 2020-05-30 RX ADMIN — AMPICILLIN SODIUM AND SULBACTAM SODIUM 3 G: 2; 1 INJECTION, POWDER, FOR SOLUTION INTRAMUSCULAR; INTRAVENOUS at 09:17

## 2020-05-30 RX ADMIN — ACETYLCYSTEINE 4 ML: 100 SOLUTION ORAL; RESPIRATORY (INHALATION) at 09:05

## 2020-05-30 RX ADMIN — LEVALBUTEROL HYDROCHLORIDE 1.25 MG: 1.25 SOLUTION RESPIRATORY (INHALATION) at 20:09

## 2020-05-30 RX ADMIN — CHLORHEXIDINE GLUCONATE 0.12% ORAL RINSE 15 ML: 1.2 LIQUID ORAL at 12:05

## 2020-05-30 RX ADMIN — QUETIAPINE FUMARATE 25 MG: 25 TABLET ORAL at 20:18

## 2020-05-30 RX ADMIN — HEPARIN SODIUM 5000 UNITS: 5000 INJECTION, SOLUTION INTRAVENOUS; SUBCUTANEOUS at 22:36

## 2020-05-30 RX ADMIN — LEVETIRACETAM 1000 MG: 10 INJECTION INTRAVENOUS at 22:36

## 2020-05-30 RX ADMIN — LEVALBUTEROL HYDROCHLORIDE 1.25 MG: 1.25 SOLUTION RESPIRATORY (INHALATION) at 16:22

## 2020-05-30 RX ADMIN — LORAZEPAM 2 MG: 2 INJECTION INTRAMUSCULAR at 13:20

## 2020-05-30 RX ADMIN — LEVALBUTEROL HYDROCHLORIDE 1.25 MG: 1.25 SOLUTION RESPIRATORY (INHALATION) at 09:05

## 2020-05-30 RX ADMIN — CHLORHEXIDINE GLUCONATE 0.12% ORAL RINSE 15 ML: 1.2 LIQUID ORAL at 16:37

## 2020-05-30 RX ADMIN — CHLORHEXIDINE GLUCONATE 0.12% ORAL RINSE 15 ML: 1.2 LIQUID ORAL at 20:18

## 2020-05-30 RX ADMIN — AMPICILLIN SODIUM AND SULBACTAM SODIUM 3 G: 2; 1 INJECTION, POWDER, FOR SOLUTION INTRAMUSCULAR; INTRAVENOUS at 20:18

## 2020-05-30 RX ADMIN — ASPIRIN 81 MG CHEWABLE TABLET 81 MG: 81 TABLET CHEWABLE at 08:55

## 2020-05-30 RX ADMIN — ACETYLCYSTEINE 4 ML: 100 SOLUTION ORAL; RESPIRATORY (INHALATION) at 23:38

## 2020-05-30 RX ADMIN — TICAGRELOR 90 MG: 90 TABLET ORAL at 08:56

## 2020-05-30 RX ADMIN — Medication 25 MG: at 08:55

## 2020-05-30 RX ADMIN — ACETYLCYSTEINE 4 ML: 100 SOLUTION ORAL; RESPIRATORY (INHALATION) at 20:09

## 2020-05-30 RX ADMIN — ACETAMINOPHEN 650 MG: 325 TABLET ORAL at 16:37

## 2020-05-30 RX ADMIN — ACETAMINOPHEN 650 MG: 325 TABLET ORAL at 10:58

## 2020-05-30 RX ADMIN — LEVETIRACETAM 1000 MG: 10 INJECTION INTRAVENOUS at 10:58

## 2020-05-30 RX ADMIN — LEVALBUTEROL HYDROCHLORIDE 1.25 MG: 1.25 SOLUTION RESPIRATORY (INHALATION) at 23:38

## 2020-05-30 RX ADMIN — ACETYLCYSTEINE 4 ML: 100 SOLUTION ORAL; RESPIRATORY (INHALATION) at 16:22

## 2020-05-30 RX ADMIN — ACETYLCYSTEINE 4 ML: 100 SOLUTION ORAL; RESPIRATORY (INHALATION) at 00:38

## 2020-05-30 RX ADMIN — ACETAMINOPHEN 650 MG: 325 TABLET ORAL at 05:46

## 2020-05-30 RX ADMIN — POTASSIUM CHLORIDE 20 MEQ: 1.5 POWDER, FOR SOLUTION ORAL at 05:46

## 2020-05-30 RX ADMIN — HEPARIN SODIUM 5000 UNITS: 5000 INJECTION, SOLUTION INTRAVENOUS; SUBCUTANEOUS at 05:46

## 2020-05-30 RX ADMIN — Medication 25 MG: at 20:18

## 2020-05-30 RX ADMIN — AMPICILLIN SODIUM AND SULBACTAM SODIUM 3 G: 2; 1 INJECTION, POWDER, FOR SOLUTION INTRAMUSCULAR; INTRAVENOUS at 03:30

## 2020-05-30 RX ADMIN — ACETYLCYSTEINE 4 ML: 100 SOLUTION ORAL; RESPIRATORY (INHALATION) at 12:19

## 2020-05-30 RX ADMIN — LORAZEPAM 2 MG: 2 INJECTION INTRAMUSCULAR; INTRAVENOUS at 13:20

## 2020-05-30 RX ADMIN — HEPARIN SODIUM 5000 UNITS: 5000 INJECTION, SOLUTION INTRAVENOUS; SUBCUTANEOUS at 14:01

## 2020-05-30 RX ADMIN — MIDAZOLAM (PF) 1 MG/ML IN 0.9 % SODIUM CHLORIDE INTRAVENOUS SOLUTION 2 MG/HR: at 13:51

## 2020-05-30 RX ADMIN — ACETYLCYSTEINE 4 ML: 100 SOLUTION ORAL; RESPIRATORY (INHALATION) at 04:18

## 2020-05-30 RX ADMIN — MULTIVITAMIN 15 ML: LIQUID ORAL at 08:55

## 2020-05-30 RX ADMIN — LEVALBUTEROL HYDROCHLORIDE 1.25 MG: 1.25 SOLUTION RESPIRATORY (INHALATION) at 12:19

## 2020-05-30 RX ADMIN — AMPICILLIN SODIUM AND SULBACTAM SODIUM 3 G: 2; 1 INJECTION, POWDER, FOR SOLUTION INTRAMUSCULAR; INTRAVENOUS at 14:40

## 2020-05-30 RX ADMIN — Medication 40 MG: at 08:56

## 2020-05-30 RX ADMIN — TICAGRELOR 90 MG: 90 TABLET ORAL at 20:18

## 2020-05-30 ASSESSMENT — ACTIVITIES OF DAILY LIVING (ADL)
ADLS_ACUITY_SCORE: 18

## 2020-05-30 ASSESSMENT — MIFFLIN-ST. JEOR: SCORE: 1788.75

## 2020-05-30 NOTE — PROGRESS NOTES
Cardiology Progress Note  Scot Bishop MRN: [id number]  Age: 39 year old, : 1980  Date: 2020            Assessment and Plan:     Scot Bishop is a 39 year old male who was admitted on 2020 for cardiac arrest. Pt reportedly had chest pain that started on 20. He was using Drano on his pipes at home and did not initially seek medical attention for CP and SOB since it said on the box that Drano can cause those symptoms. He took a shower and had syncopal episode after coming out of the shower. EMS was called; initial rhythm asystole. With chest compressions pt eventually had PEA and then eventually had VF in the field. After multiple cycles of epinephrine had ROSC. He was intubated in the field.   Pt was brought to [facility] ED where he regained a pulse and was brought to the Cath Lab for coronary angiogram. Initially, BP was 90s/60s as he was being transported from ED but he had recurrent cardiac arrest on arrival to Cath Lab. ECG showed ST elevation in aVR. He was promptly placed on VA ECMO. He had thrombotic occlusion of proximal LAD and underwent successful aspiration thrombectomy and PCI w/ NAZIA of proximal and mid LAD.     Interval events: Developed an episode of seziure at bedside, required ativanx1 and then placement of a versed ggt    Today's plan:   - Will discuss with Neuro if this was a seizure event, if not will take off versed ggt  - Will then attempt PST tomorrow  - Care conference on Tuesday     Neurology: Intubated. Initial CT head negative. Cooled to 34 degrees on arrival; rewarmed  AM. EEG  showed severe diffuse encephalopathy without seizures or epileptiform discharges.  Repeat CTH : multifocal acute/subacute cerebral infarcts   Brain MRI : diffuse acute/subacute infarcts in bilat cerebral hemispheres, corpus callosum, and periventricular white matter c/w evolving diffuse hypoxic ischemic brain injury   Episode of LUE posturing and  rhythmic tremors overnight on 5/27. EEG restarted 5/28.  -EEG: moderate to severe encephalopathy, no seizures   -off sedation; fentanyl bumps and PO oxycodone as needed for signs of pain. Still no episodes  -seroquel 25 mg PM     -Neurocrit following; appreciate their recs    Cardiovascular / Hemodynamics: Refractory VF arrest    S/p NAZIA to proximal-mid LAD  Sinus tachycardia   Peripheral VA ECMO inserted for cardiac arrest. LA 16 initially. Suspect ongoing tachycardia d/t evolving MI and post-arrest state. ECMO decannulation at bedside on 5/19; IABP discontinued 5/20. 23 second run on VT overnight on 5/27.   TTE 5/26/20: EF 35-40%, RV normal   EKG 5/29: sinus tachycardia, QTc 439.   -continue ASA 81mg daily and ticagrelor 90mg BID  -continue metoprolol tartrate 25 mg BID   -hold statin for now given likely hepatic injury during arrest  -hold ACE/ARB for now given likely reduced renal fxn after arrest   Pulmonary: Acute hypoxic respiratory failure  COVID negative  Admission CT chest showed bilateral consolidations c/w aspiration PNA. Had thick secretions that required bagging by RT on 5/20. Bronchoscopy 5/21. Increasing oxygen requirements and pt-vent dyssynchrony on 5/22. Pulmonary re-consulted, infectious work up, pt sedated. Significant pressure injury of tongue w/ splitting of tongue noted by [facility] nurse today. No active bleeding.    Vent settings this AM: 16/500/7/35%   ABG on these settings: 7.50/35/137/27   CXR 5/28: pulmonary edema, slightly improved. Lines in stable position.   -ENT consult 5/27 for pressure injury of tongue   -PST as tolerated; pressure supported for several hours yesterday     -continue mucomyst and albuterol nebs  -added hypertonic saline nebs per pulm   -growing GNR in sputum 5/22; continue meropenem (5/22 - 6/5)     -wean vent and PST as able  -daily CXR  -Q12h ABGs and PRN   -consider scheduled duonebs if signs of lung dz, currently PRN       GI and Nutrition: Shock liver  GIB, resolved  "   -> 177,  -> 74-->24 T bili 0.7.    -post-pyloric FT placement 5/26  -monitor LFTs  -continue TF   -bowel regimen   -GI Prophylaxis: Protonix BID for UGIB   Renal, Fluid and Electrolytes: Acute kidney injury   Cr improved to 0.93 this AM. 3.5L UOP and net negative 331 mL yesterday after lasix 60 mg IV x 1. Na 144 today.   - mL q 1 hr   -lasix 40 mg IV this AM  -monitor I/O  -maintain K>3.8 and Mg>2    Infectious Disease: Aspiration pneumonia  Leukocytosis  Grew GNR on sputum with WBC:15.8. Had a temp of 100.9 overnight. Held off on antibiotics presently.  - Can start antibx if another temp.    Hematology and Oncology: Receiving ASA/ticagrelor for NAZIA. Transfused 1 unit PRBCs 5/21. Hgb 8.6 yesterday AM. No signs of active bleeding. Plts uptrending - 484 today.   -recheck CBC   -transfuse for Hgb<7   -DVT PPX: subcutaneous heparin    Endocrinology: No known medical history. BG elevated.  -not requiring insulin gtt  -HgbA1c 5.2   Lines:       R radial arterial line May 17, 2020  ETT May 17, 2020  Silva catheter May 17, 2020  OG tube May 17, 2020  NJ tube May 26, 2020   Restraint: not currently needed    Current lines are required for patient management       Family update by me today: Yes     Code Status: Full code     The pt was discussed and evaluated with Dr. Matamoros, attending physician, who agrees with the assessment and plan above.     Lainey Snow MD  Cardiology   PGY4          Objective     /70   Temp 100.6  F (38.1  C)   Resp 25   Ht 1.676 m (5' 6\")   Wt 93.6 kg (206 lb 5.6 oz)   SpO2 98%   BMI 33.31 kg/m    Temp:  [99.7  F (37.6  C)-101.1  F (38.4  C)] 100.6  F (38.1  C)  Heart Rate:  [108-156] 130  Resp:  [22-26] 25  MAP:  [67 mmHg-114 mmHg] 67 mmHg  Arterial Line BP: ()/(54-82) 91/57  FiO2 (%):  [30 %] 30 %  SpO2:  [96 %-100 %] 98 %  Wt Readings from Last 2 Encounters:   05/30/20 93.6 kg (206 lb 5.6 oz)     I/O last 3 completed shifts:  In: 4190.3 [I.V.:505.3; " NG/GT:2420]  Out: 4780 [Urine:4105; Emesis/NG output:675]      GENERAL APPEARANCE: Intubated. NAD.  HEENT: No icterus, PERRL 2 mm, ETT in place, NJ tube in place  CARDIOVASCULAR: sinus tachycardia, normal S1 and S2, no S3 or S4 and no murmur, click or rub. Normal PMI. Pulses dopplerable.  RESP: Clear bilaterally. Mechanical ventilation.    GASTRO: Soft, bowel sounds hypoactive but present  GENITOURINARY: Silva in place.  EXTREMITIES: Warm, no edema, pulses dopplered.  NEURO: Intubated, Pupils equal and reactive, 2 mm. Moving extremities and opening eyes although not following commands. Withdraws to pain.    INTEGUMENTARY: No rashes. Line sites CDI. Bilaterally groin sites CDI; right groin ecchymotic, incision intact.   LINES/TUBES/DRAINS: R radial Arterial line. ETT. OG. NJ. Silva Catheter.          Data:     Vent Settings:  Resp: 25 SpO2: 98 % O2 Device: Mechanical Ventilator      Arterial Blood Gas:   Recent Labs   Lab 05/29/20  0431 05/28/20  0605 05/28/20  0414 05/27/20  0312 05/26/20  0345   PH 7.50* 7.47* 7.49*  --  7.47*   PCO2 35 36 34*  --  33*   PO2 137* 141* 61*  --  125*   HCO3 27 26 26  --  24   O2PER 35.0 40 30 30.0 40.0       Vitals:    05/28/20 0000 05/29/20 0400 05/30/20 0000   Weight: 93.8 kg (206 lb 12.7 oz) 89.4 kg (197 lb 1.5 oz) 93.6 kg (206 lb 5.6 oz)   I/O last 3 completed shifts:  In: 4190.3 [I.V.:505.3; NG/GT:2420]  Out: 4780 [Urine:4105; Emesis/NG output:675]  Recent Labs   Lab 05/30/20  0343 05/29/20  0444    144   POTASSIUM 3.6 3.6   CHLORIDE 109 111*   CO2 25 27   ANIONGAP 5 6   * 128*   BUN 34* 33*   CR 0.84 0.93   TEN 8.7 8.7     No components found for: URINE   Recent Labs   Lab 05/30/20  0343 05/29/20  0444 05/27/20  0312   AST 62* 74* 113*   * 177* 242*   BILITOTAL 0.7 0.7 1.0   ALBUMIN 2.9* 2.9* 2.8*   PROTTOTAL 7.6 7.6 7.5   ALKPHOS 116 120 114     Temp: 100.6  F (38.1  C) Temp src: BladderTemp  Min: 99.7  F (37.6  C)  Max: 101.1  F (38.4  C)   Recent Labs    Lab 20  0343 20  0444 20  1640 20  0312 20  1751   WBC 16.0* 15.8* 16.6* 14.9* 16.6*   HGB 8.6* 8.6* 9.0* 8.3* 8.5*   HCT 28.0* 27.9* 29.9* 27.9* 28.1*   MCV 98 100 100 101* 100   RDW 15.5* 15.6* 15.7* 16.1* 16.1*   * 484* 475* 348 339     No lab results found in last 7 days.  Recent Labs   Lab 20  0343 05/29/20  0444 20  0422 20  1751   * 128* 116* 113* 102*       All imaging personally reviewed:  Recent Results (from the past 24 hour(s))   Echocardiogram Complete    Narrative    [id number]  [id number]  [id number]  [id number]^GRIS^YOSHI           [facility],[place]  Echocardiography Laboratory  [address] [place] [id number]     Name: AFINA FORRESTER  MRN: [id number]  : 1980  Study Date: 2020 08:16 AM  Age: 39 yrs  Gender: Male  Patient Location: [facility]  Reason For Study: Cardiac Arrest  Ordering Physician: YOSHI LANDRY  Performed By: Denisse Johnson RDCS     BSA: 2.1 m2  Height: 66 in  Weight: 231 lb  BP: 132/58 mmHg  _____________________________________________________________________________  __        Procedure  Complete Portable Echo Adult. Technically difficult study.Extremely poor  acoustic windows.  _____________________________________________________________________________  __        Interpretation Summary  VA ECMO at 3.7L/min. Technically difficult study. Extremely poor acoustic  windows.  Severely (EF <30%) reduced left ventricular function on extremely limited  views.  The right ventricle cannot be assessed.  No pericardial effusion is present.  Previous study not available for comparison.  _____________________________________________________________________________  __        Left Ventricle  Left ventricular wall thickness cannot evaluate. Left ventricular size is  normal. Severely (EF <30%) reduced left ventricular function is present. Left  ventricular diastolic function is not  assessable. Severe diffuse hypokinesis  is present.     Right Ventricle  The right ventricle cannot be assessed. Right ventricular function cannot be  assessed due to poor image quality.     Mitral Valve  The mitral valve cannot be assessed.        Aortic Valve  The aortic valve opens with each cardiac cycle. On Doppler interrogation,  there is no significant stenosis or regurgitation.     Tricuspid Valve  The tricuspid valve cannot be assessed. Pulmonary artery systolic pressure  cannot be assessed.     Pulmonic Valve  The pulmonic valve cannot be assessed.     Vessels  The aorta root cannot be assessed. The thoracic aorta cannot be assessed.  Venous ECMO cannula is visualized traversing the IVC.     Pericardium  No pericardial effusion is present.     Compared to Previous Study  Previous study not available for comparison.     _____________________________________________________________________________  __                       Report approved by: Ashlee Izquierdo 05/18/2020 09:18 AM                 _____________________________________________________________________________  __                Medications     Current Facility-Administered Medications   Medication     0.9% sodium chloride BOLUS     0.9% sodium chloride BOLUS     acetaminophen (TYLENOL) tablet 650 mg     acetylcysteine (MUCOMYST) 10 % nebulizer solution 4 mL     albuterol (PROVENTIL) neb solution 2.5 mg     ampicillin-sulbactam (UNASYN) 3 g vial to attach to  mL bag     artificial tears ophthalmic ointment     aspirin (ASA) chewable tablet 81 mg     calcium chloride in  mL intermittent infusion 1 g     chlorhexidine (PERIDEX) 0.12 % solution 15 mL     dextrose 10% infusion     glucose gel 15-30 g    Or     dextrose 50 % injection 25-50 mL    Or     glucagon injection 1 mg     fentaNYL (PF) (SUBLIMAZE) injection 50 mcg     heparin ANTICOAGULANT injection 5,000 Units     heparin lock flush 10 UNIT/ML injection 5-10 mL     heparin  lock flush 10 UNIT/ML injection 5-10 mL     hydrALAZINE (APRESOLINE) injection 10 mg     ipratropium - albuterol 0.5 mg/2.5 mg/3 mL (DUONEB) neb solution 3 mL     levalbuterol (XOPENEX) neb solution 1.25 mg     levETIRAcetam (KEPPRA) intermittent infusion 1,000 mg     lidocaine (LMX4) cream     lidocaine 1 % 0.1-1 mL     magnesium sulfate 2 g in water intermittent infusion     magnesium sulfate 4 g in 100 mL sterile water (premade)     metoprolol tartrate (LOPRESSOR) half-tab 25 mg     midazolam (VERSED) drip - ADULT 100 mg/100 mL in NS PRE-MIX     multivitamins w/minerals (CERTAVITE) liquid 15 mL     naloxone (NARCAN) injection 0.1-0.4 mg     oxyCODONE (ROXICODONE) tablet 5 mg     pantoprazole (PROTONIX) 2 mg/mL suspension 40 mg     potassium chloride (KLOR-CON) Packet 20-40 mEq     potassium chloride 10 mEq in 100 mL intermittent infusion with 10 mg lidocaine     potassium chloride 10 mEq in 100 mL sterile water intermittent infusion (premix)     potassium chloride 20 mEq in 50 mL intermittent infusion     potassium chloride ER (KLOR-CON M) CR tablet 20-40 mEq     QUEtiapine (SEROquel) tablet 25 mg     senna-docusate (SENOKOT-S/PERICOLACE) 8.6-50 MG per tablet 1 tablet     sodium chloride (NEBUSAL) 3 % neb solution 3 mL     sodium chloride (PF) 0.9% PF flush 10-20 mL     sodium chloride (PF) 0.9% PF flush 3 mL     sodium chloride (PF) 0.9% PF flush 3 mL     sodium phosphate 10 mmol in D5W intermittent infusion     sodium phosphate 15 mmol in D5W intermittent infusion     sodium phosphate 20 mmol in D5W intermittent infusion     sodium phosphate 25 mmol in D5W intermittent infusion     ticagrelor (BRILINTA) tablet 90 mg

## 2020-05-30 NOTE — PROGRESS NOTES
ENT Progress Note  5/30/20    Events  Per RN, bite block was removed overnight per recommendation of RT    Objective  Vital signs:  Temp: 100.2  F (37.9  C) Temp src: Bladder     Heart Rate: 112 Resp: 24   Gen: laying in bed sedated, turns head side to side constantly  Resp: Normal SaO2 on mechanical ventilation via ETT  HEENT: orotracheal ETT positioned in midline and splaying the anterior tongue wound. Tongue wound appears stable when compared to prior descriptions. No active bleeding. No xeroform gauze was present when examined.    Assessment  Scot Bishop is a 39 year old male who had an MI with asystole, PEA and V-fiib requiring advance resuscitative effort and intubation s/p ECMO and PCI who now has a 1.5 - 2cm pressure/erosive wound to his anterior central tongue likely from the ETT tube and ETT tube bite block which were resting directly on the area despite lateral movement of the tube on the head gear. This wound is not likely to be a malignancy given the time course and prior multiple photographs of his tongue (by WOCN) since admission.    His tongue appears about the same as prior exams; however, there was no bite block in place and the ETT was positioned such that it was continuing to put pressure on the wound and splay the wound edges. Per RN, the bite block was removed last night at the recommendation of RT. There is no documentation that the xeroform was replaced on the tongue after it was intially placed on 5/27/20.    Plan  - Keep pressure off the tongue as much as possible. DO NOT allow ETT to rest directly on anterior tongue. Frequently move ETT from side to side to decrease tongue pressure.   - Place vaseline guaze or xeroform on anterior tongue wound at all times. ONLY PLACE ONE LONG PIECE AND ENSURE DISTAL END IS OUT OF THE MOUTH to prevent aspiration of guaze. Tape the distal end to the face  - Peridex rinses QID  - Recommend placing a soft bite block to reduce pressure on tongue if safe.  Please place between molars laterally (avoid placing anteriorly). Can use rolled up guaze and tape or obtain an actual bite block from OR/central supplies  - Early extubation/ trach if possible to decrease pressure on tongue  - We will await care conference on Tuesday to determine if surgical intervention will be necessary vs healing by secondary intention  - It is unclear if anyone replaced the vaseline gauze on the tongue. The charge RN is calling past nurses to ensure that no gauze was lost in the mouth. If it turns out that a piece is missing, please call ENT    Patient to be discussed with Dr. Franky Mills MD  Otolaryngology-Head & Neck Surgery PGY3  Please contact ENT with questions by dialing [phone] and entering job code [phone] when prompted.

## 2020-05-30 NOTE — PLAN OF CARE
ICU End of Shift Summary. See flowsheets for vital signs and detailed assessment.    Changes this shift: No progression with pt neuro status; moving head frequently, protective with arms. Not following commands or tracking. Sinus tach 120s, normotensive. Continues to have low-grade fever, on scheduled tylenol. Very strong, frequent cough with thick secretions out of ETT. Rectal pouch placed for large liquid stools. Potassium replaced.     Plan:  Continue to monitor neuro status, update team of changes.       Problem: Adjustment to Therapy (Extracorporeal Life Support/ECMO)  Goal: Optimal Adjustment to Therapy  5/30/2020 0605 by David Elkins, RN  Outcome: No Change     Problem: Airway Clearance Ineffective  Goal: Effective Airway Clearance  5/30/2020 0605 by David Elkins, RN  Outcome: No Change

## 2020-05-30 NOTE — PLAN OF CARE
ICU End of Shift Summary. See flowsheets for vital signs and detailed assessment.    Changes this shift: Neuro slightly improved, more all extremity movements at 1600, weak. Pt more awake during the evening, not able to tract or follow any commands. Withdraws from pain. Tachycardic at 120's most of shift, no ectopy, MD aware/assessed at bedside. MAP goal >65. OG contineus LIS. NG TF. Strong cough, can decreased BP MAP 60's. Silva, patent. Tongue wound, ENT, WOC and RT following; talked to RT, without bite block will be better for patient, a bite block will cause increased worsening on tongue sore. Pt tolerating without bite block, no obstructions or issues. Explained to RT that WOC and ENT wants us to try to attempt, RT strongly recommends without. Changed caps. Good PO cares q2 hrs.     Plan: Continue to monitor respiratory and neuro status. Possible care conference on Monday or Tuesday. Continue with POC.     Problem: Airway Clearance Ineffective  Goal: Effective Airway Clearance  Outcome: No Change     Problem: Adult Inpatient Plan of Care  Goal: Optimal Comfort and Wellbeing  5/29/2020 1943 by Halina العراقي, RN  Outcome: No Change  5/29/2020 0748 by Loreta Meneses, RN  Outcome: No Change

## 2020-05-30 NOTE — PROGRESS NOTES
INTERIM REPORT of Video-EEG Monitoring              DATE OF RECORDIN2020         I reviewed the continuing video-EEG monitoring of Scot Bsihop.         The EEG during coma is unchanged and abnormal due to generalized delta-theta slowing, with faster alpha-beta activities of lower amplitude bilaterally.  No interictal epileptiform abnormalities and no electrographic seizures have been observed.         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    German John M.D., [facility] [phone]

## 2020-05-30 NOTE — PLAN OF CARE
ICU End of Shift Summary. See flowsheets for vital signs and detailed assessment.    Changes this shift: Pt had seizure like activity. MD notified and 2 of ativan given. Versed gtt started, as RR was in 30s and Hr in 150s. Not following commands or tracking. Sinus tach 120s prior to seizure like activity. Continues to have low-grade fever, on scheduled tylenol. No stool this shift.    Plan: Continue to monitor and follow POC. Update MD as needed.

## 2020-05-31 ENCOUNTER — APPOINTMENT (OUTPATIENT)
Dept: NEUROLOGY | Facility: CLINIC | Age: 40
End: 2020-05-31
Attending: NURSE PRACTITIONER
Payer: MEDICAID

## 2020-05-31 LAB
ALBUMIN SERPL-MCNC: 2.8 G/DL (ref 3.4–5)
ALP SERPL-CCNC: 126 U/L (ref 40–150)
ALT SERPL W P-5'-P-CCNC: 123 U/L (ref 0–70)
ANION GAP SERPL CALCULATED.3IONS-SCNC: 6 MMOL/L (ref 3–14)
AST SERPL W P-5'-P-CCNC: 56 U/L (ref 0–45)
BASE EXCESS BLDA CALC-SCNC: 2.4 MMOL/L
BILIRUB SERPL-MCNC: 0.6 MG/DL (ref 0.2–1.3)
BUN SERPL-MCNC: 37 MG/DL (ref 7–30)
CALCIUM SERPL-MCNC: 8.8 MG/DL (ref 8.5–10.1)
CHLORIDE SERPL-SCNC: 111 MMOL/L (ref 94–109)
CO2 SERPL-SCNC: 25 MMOL/L (ref 20–32)
CREAT SERPL-MCNC: 0.87 MG/DL (ref 0.66–1.25)
ERYTHROCYTE [DISTWIDTH] IN BLOOD BY AUTOMATED COUNT: 15.4 % (ref 10–15)
GFR SERPL CREATININE-BSD FRML MDRD: >90 ML/MIN/{1.73_M2}
GLUCOSE SERPL-MCNC: 120 MG/DL (ref 70–99)
HCO3 BLD-SCNC: 26 MMOL/L (ref 21–28)
HCT VFR BLD AUTO: 28.7 % (ref 40–53)
HGB BLD-MCNC: 8.9 G/DL (ref 13.3–17.7)
LACTATE BLD-SCNC: 1 MMOL/L (ref 0.7–2)
MCH RBC QN AUTO: 30.7 PG (ref 26.5–33)
MCHC RBC AUTO-ENTMCNC: 31 G/DL (ref 31.5–36.5)
MCV RBC AUTO: 99 FL (ref 78–100)
O2/TOTAL GAS SETTING VFR VENT: 30 %
PCO2 BLD: 34 MM HG (ref 35–45)
PH BLD: 7.48 PH (ref 7.35–7.45)
PLATELET # BLD AUTO: 545 10E9/L (ref 150–450)
PO2 BLD: 116 MM HG (ref 80–105)
POTASSIUM SERPL-SCNC: 3.8 MMOL/L (ref 3.4–5.3)
PROT SERPL-MCNC: 7.6 G/DL (ref 6.8–8.8)
RBC # BLD AUTO: 2.9 10E12/L (ref 4.4–5.9)
SODIUM SERPL-SCNC: 142 MMOL/L (ref 133–144)
TROPONIN I SERPL-MCNC: 0.15 UG/L (ref 0–0.04)
WBC # BLD AUTO: 14.4 10E9/L (ref 4–11)

## 2020-05-31 PROCEDURE — 94003 VENT MGMT INPAT SUBQ DAY: CPT

## 2020-05-31 PROCEDURE — 25000128 H RX IP 250 OP 636

## 2020-05-31 PROCEDURE — 25000125 ZZHC RX 250: Performed by: NURSE PRACTITIONER

## 2020-05-31 PROCEDURE — 80053 COMPREHEN METABOLIC PANEL: CPT | Performed by: NURSE PRACTITIONER

## 2020-05-31 PROCEDURE — 25000128 H RX IP 250 OP 636: Performed by: STUDENT IN AN ORGANIZED HEALTH CARE EDUCATION/TRAINING PROGRAM

## 2020-05-31 PROCEDURE — 25000125 ZZHC RX 250

## 2020-05-31 PROCEDURE — 25000132 ZZH RX MED GY IP 250 OP 250 PS 637: Performed by: NURSE PRACTITIONER

## 2020-05-31 PROCEDURE — 99291 CRITICAL CARE FIRST HOUR: CPT | Mod: GC | Performed by: INTERNAL MEDICINE

## 2020-05-31 PROCEDURE — 83605 ASSAY OF LACTIC ACID: CPT | Performed by: NURSE PRACTITIONER

## 2020-05-31 PROCEDURE — 25000132 ZZH RX MED GY IP 250 OP 250 PS 637: Performed by: STUDENT IN AN ORGANIZED HEALTH CARE EDUCATION/TRAINING PROGRAM

## 2020-05-31 PROCEDURE — 40000275 ZZH STATISTIC RCP TIME EA 10 MIN

## 2020-05-31 PROCEDURE — 25000132 ZZH RX MED GY IP 250 OP 250 PS 637

## 2020-05-31 PROCEDURE — 25000132 ZZH RX MED GY IP 250 OP 250 PS 637: Performed by: INTERNAL MEDICINE

## 2020-05-31 PROCEDURE — 84484 ASSAY OF TROPONIN QUANT: CPT | Performed by: NURSE PRACTITIONER

## 2020-05-31 PROCEDURE — 82803 BLOOD GASES ANY COMBINATION: CPT | Performed by: STUDENT IN AN ORGANIZED HEALTH CARE EDUCATION/TRAINING PROGRAM

## 2020-05-31 PROCEDURE — 85027 COMPLETE CBC AUTOMATED: CPT | Performed by: NURSE PRACTITIONER

## 2020-05-31 PROCEDURE — 95714 VEEG EA 12-26 HR UNMNTR: CPT

## 2020-05-31 PROCEDURE — 94640 AIRWAY INHALATION TREATMENT: CPT | Mod: 76

## 2020-05-31 PROCEDURE — 25000128 H RX IP 250 OP 636: Performed by: NURSE PRACTITIONER

## 2020-05-31 PROCEDURE — 94640 AIRWAY INHALATION TREATMENT: CPT

## 2020-05-31 PROCEDURE — 27210437 ZZH NUTRITION PRODUCT SEMIELEM INTERMED LITER

## 2020-05-31 PROCEDURE — 40000014 ZZH STATISTIC ARTERIAL MONITORING DAILY

## 2020-05-31 PROCEDURE — 20000004 ZZH R&B ICU UMMC

## 2020-05-31 RX ORDER — FUROSEMIDE 10 MG/ML
40 INJECTION INTRAMUSCULAR; INTRAVENOUS ONCE
Status: COMPLETED | OUTPATIENT
Start: 2020-05-31 | End: 2020-05-31

## 2020-05-31 RX ADMIN — MULTIVITAMIN 15 ML: LIQUID ORAL at 08:56

## 2020-05-31 RX ADMIN — CHLORHEXIDINE GLUCONATE 0.12% ORAL RINSE 15 ML: 1.2 LIQUID ORAL at 12:51

## 2020-05-31 RX ADMIN — TICAGRELOR 90 MG: 90 TABLET ORAL at 08:56

## 2020-05-31 RX ADMIN — LEVALBUTEROL HYDROCHLORIDE 1.25 MG: 1.25 SOLUTION RESPIRATORY (INHALATION) at 23:57

## 2020-05-31 RX ADMIN — Medication 25 MG: at 08:55

## 2020-05-31 RX ADMIN — ACETAMINOPHEN 650 MG: 325 TABLET ORAL at 22:15

## 2020-05-31 RX ADMIN — LEVALBUTEROL HYDROCHLORIDE 1.25 MG: 1.25 SOLUTION RESPIRATORY (INHALATION) at 07:54

## 2020-05-31 RX ADMIN — HEPARIN SODIUM 5000 UNITS: 5000 INJECTION, SOLUTION INTRAVENOUS; SUBCUTANEOUS at 14:09

## 2020-05-31 RX ADMIN — CHLORHEXIDINE GLUCONATE 0.12% ORAL RINSE 15 ML: 1.2 LIQUID ORAL at 08:55

## 2020-05-31 RX ADMIN — ACETYLCYSTEINE 4 ML: 100 SOLUTION ORAL; RESPIRATORY (INHALATION) at 15:23

## 2020-05-31 RX ADMIN — CHLORHEXIDINE GLUCONATE 0.12% ORAL RINSE 15 ML: 1.2 LIQUID ORAL at 16:27

## 2020-05-31 RX ADMIN — HEPARIN SODIUM 5000 UNITS: 5000 INJECTION, SOLUTION INTRAVENOUS; SUBCUTANEOUS at 05:54

## 2020-05-31 RX ADMIN — ACETYLCYSTEINE 4 ML: 100 SOLUTION ORAL; RESPIRATORY (INHALATION) at 11:56

## 2020-05-31 RX ADMIN — Medication 40 MG: at 08:56

## 2020-05-31 RX ADMIN — ACETYLCYSTEINE 4 ML: 100 SOLUTION ORAL; RESPIRATORY (INHALATION) at 20:38

## 2020-05-31 RX ADMIN — FUROSEMIDE 40 MG: 10 INJECTION, SOLUTION INTRAMUSCULAR; INTRAVENOUS at 14:06

## 2020-05-31 RX ADMIN — TICAGRELOR 90 MG: 90 TABLET ORAL at 20:32

## 2020-05-31 RX ADMIN — QUETIAPINE FUMARATE 25 MG: 25 TABLET ORAL at 20:32

## 2020-05-31 RX ADMIN — ACETYLCYSTEINE 4 ML: 100 SOLUTION ORAL; RESPIRATORY (INHALATION) at 07:54

## 2020-05-31 RX ADMIN — Medication 25 MG: at 20:32

## 2020-05-31 RX ADMIN — POTASSIUM CHLORIDE 20 MEQ: 1.5 POWDER, FOR SOLUTION ORAL at 05:54

## 2020-05-31 RX ADMIN — LEVETIRACETAM 1000 MG: 10 INJECTION INTRAVENOUS at 10:43

## 2020-05-31 RX ADMIN — ASPIRIN 81 MG CHEWABLE TABLET 81 MG: 81 TABLET CHEWABLE at 08:55

## 2020-05-31 RX ADMIN — ACETAMINOPHEN 650 MG: 325 TABLET ORAL at 05:54

## 2020-05-31 RX ADMIN — LEVALBUTEROL HYDROCHLORIDE 1.25 MG: 1.25 SOLUTION RESPIRATORY (INHALATION) at 03:30

## 2020-05-31 RX ADMIN — LEVETIRACETAM 1000 MG: 10 INJECTION INTRAVENOUS at 22:16

## 2020-05-31 RX ADMIN — CHLORHEXIDINE GLUCONATE 0.12% ORAL RINSE 15 ML: 1.2 LIQUID ORAL at 20:32

## 2020-05-31 RX ADMIN — ACETYLCYSTEINE 4 ML: 100 SOLUTION ORAL; RESPIRATORY (INHALATION) at 03:30

## 2020-05-31 RX ADMIN — HEPARIN SODIUM 5000 UNITS: 5000 INJECTION, SOLUTION INTRAVENOUS; SUBCUTANEOUS at 22:15

## 2020-05-31 RX ADMIN — AMPICILLIN SODIUM AND SULBACTAM SODIUM 3 G: 2; 1 INJECTION, POWDER, FOR SOLUTION INTRAMUSCULAR; INTRAVENOUS at 14:24

## 2020-05-31 RX ADMIN — LEVALBUTEROL HYDROCHLORIDE 1.25 MG: 1.25 SOLUTION RESPIRATORY (INHALATION) at 15:22

## 2020-05-31 RX ADMIN — ACETAMINOPHEN 650 MG: 325 TABLET ORAL at 16:27

## 2020-05-31 RX ADMIN — AMPICILLIN SODIUM AND SULBACTAM SODIUM 3 G: 2; 1 INJECTION, POWDER, FOR SOLUTION INTRAMUSCULAR; INTRAVENOUS at 20:30

## 2020-05-31 RX ADMIN — ACETAMINOPHEN 650 MG: 325 TABLET ORAL at 10:43

## 2020-05-31 RX ADMIN — AMPICILLIN SODIUM AND SULBACTAM SODIUM 3 G: 2; 1 INJECTION, POWDER, FOR SOLUTION INTRAMUSCULAR; INTRAVENOUS at 03:13

## 2020-05-31 RX ADMIN — LEVALBUTEROL HYDROCHLORIDE 1.25 MG: 1.25 SOLUTION RESPIRATORY (INHALATION) at 20:38

## 2020-05-31 RX ADMIN — ACETYLCYSTEINE 4 ML: 100 SOLUTION ORAL; RESPIRATORY (INHALATION) at 23:57

## 2020-05-31 RX ADMIN — Medication 40 MG: at 20:44

## 2020-05-31 RX ADMIN — AMPICILLIN SODIUM AND SULBACTAM SODIUM 3 G: 2; 1 INJECTION, POWDER, FOR SOLUTION INTRAMUSCULAR; INTRAVENOUS at 08:55

## 2020-05-31 RX ADMIN — LEVALBUTEROL HYDROCHLORIDE 1.25 MG: 1.25 SOLUTION RESPIRATORY (INHALATION) at 11:56

## 2020-05-31 ASSESSMENT — ACTIVITIES OF DAILY LIVING (ADL)
ADLS_ACUITY_SCORE: 20
ADLS_ACUITY_SCORE: 20
ADLS_ACUITY_SCORE: 18
ADLS_ACUITY_SCORE: 20
ADLS_ACUITY_SCORE: 18
ADLS_ACUITY_SCORE: 18

## 2020-05-31 ASSESSMENT — MIFFLIN-ST. JEOR: SCORE: 1790.75

## 2020-05-31 NOTE — PROGRESS NOTES
Cardiology Progress Note  Scot Bishop MRN: [id number]  Age: 39 year old, : 1980            Assessment and Plan:     Scot Bishop is a 39 year old male who was admitted on 2020 for cardiac arrest. Pt reportedly had chest pain that started on 20. He was using Drano on his pipes at home and did not initially seek medical attention for CP and SOB since it said on the box that Drano can cause those symptoms. He took a shower and had syncopal episode after coming out of the shower. EMS was called; initial rhythm asystole. With chest compressions pt eventually had PEA and then eventually had VF in the field. After multiple cycles of epinephrine had ROSC. He was intubated in the field.   Pt was brought to [facility] ED where he regained a pulse and was brought to the Cath Lab for coronary angiogram. Initially, BP was 90s/60s as he was being transported from ED but he had recurrent cardiac arrest on arrival to Cath Lab. ECG showed ST elevation in aVR. He was promptly placed on VA ECMO. He had thrombotic occlusion of proximal LAD and underwent successful aspiration thrombectomy and PCI w/ NAZIA of proximal and mid LAD.     Interval events:   Continues to have non purposeful movement this morning     Today's plan:   - Care conference Tuesday re:Trach discussion   - Lasix 40mg IV x1 to keep net even     Neurology: Intubated. Initial CT head negative. Cooled to 34 degrees on arrival; rewarmed  AM. EEG  showed severe diffuse encephalopathy without seizures or epileptiform discharges.  Repeat CTH : multifocal acute/subacute cerebral infarcts   Brain MRI : diffuse acute/subacute infarcts in bilat cerebral hemispheres, corpus callosum, and periventricular white matter c/w evolving diffuse hypoxic ischemic brain injury   Episode of LUE posturing and rhythmic tremors overnight on . EEG restarted .  -EEG: moderate to severe encephalopathy, no seizures   -off  sedation  -seroquel 25 mg PM     -Neurocrit following; appreciate their recs    Cardiovascular / Hemodynamics: Refractory VF arrest    S/p NAZIA to proximal-mid LAD  Sinus tachycardia   Peripheral VA ECMO inserted for cardiac arrest. LA 16 initially. Suspect ongoing tachycardia d/t evolving MI and post-arrest state. ECMO decannulation at bedside on 5/19; IABP discontinued 5/20. 23 second run on VT overnight on 5/27.   TTE 5/26/20: EF 35-40%, RV normal   EKG 5/29: sinus tachycardia, QTc 439.   -continue ASA 81mg daily and ticagrelor 90mg BID  -continue metoprolol tartrate 25 mg BID   -hold statin for now given likely hepatic injury during arrest  -hold ACE/ARB for now given likely reduced renal fxn after arrest   Pulmonary: Acute hypoxic respiratory failure  COVID negative  Admission CT chest showed bilateral consolidations c/w aspiration PNA. Had thick secretions that required bagging by RT on 5/20. Bronchoscopy 5/21. Increasing oxygen requirements and pt-vent dyssynchrony on 5/22. Pulmonary re-consulted, infectious work up, pt sedated. Significant pressure injury of tongue w/ splitting of tongue noted by [facility] nurse today. No active bleeding.    Vent settings this AM: 16/500/7/30%   CXR 5/28: pulmonary edema, slightly improved. Lines in stable position.   -ENT consult 5/27 for pressure injury of tongue   -PST as tolerated; pressure supported for several hours yesterday     -continue mucomyst and albuterol nebs  -added hypertonic saline nebs per pulm   -growing GNR in sputum 5/22; continue meropenem (5/22 - 6/5)     -wean vent and PST as able  -daily CXR  -Q12h ABGs and PRN   -consider scheduled duonebs if signs of lung dz, currently PRN       GI and Nutrition: Shock liver  GIB, resolved    -> 177,  -> 74-->24 T bili 0.7.    -post-pyloric FT placement 5/26  -monitor LFTs  -continue TF   -bowel regimen   -GI Prophylaxis: Protonix BID for UGIB   Renal, Fluid and Electrolytes: Acute kidney injury, resolved     "Na 144 today.   -FWF 50 mL q 1 hr   -lasix 40 mg IV this AM  -monitor I/O  -maintain K>3.8 and Mg>2    Infectious Disease: Aspiration pneumonia  Leukocytosis  Grew GNR on sputum  -Continue unasyn (5/29 - ) pending susceptibilities from sputum collected 5/27    Hematology and Oncology: Receiving ASA/ticagrelor for NAZIA. Transfused 1 unit PRBCs 5/21. Hgb 8.9 yesterday AM.  -recheck CBC   -transfuse for Hgb<7   -DVT PPX: subcutaneous heparin    Endocrinology: No known medical history. BG elevated.  -not requiring insulin gtt  -HgbA1c 5.2   Lines:       R radial arterial line May 17, 2020  ETT May 17, 2020  Silva catheter May 17, 2020  OG tube May 17, 2020  NJ tube May 26, 2020   Restraint: not currently needed    Current lines are required for patient management       Family update by me today: Yes     Code Status: Full code     The pt was discussed and evaluated with Dr. Matamoros, attending physician, who agrees with the assessment and plan above.     Milagro Cevallos  Cardiology PGY4            Objective     /71   Temp 99.9  F (37.7  C) (Bladder)   Resp 14   Ht 1.676 m (5' 6\")   Wt 93.8 kg (206 lb 12.7 oz)   SpO2 99%   BMI 33.38 kg/m    Temp:  [99.1  F (37.3  C)-101.1  F (38.4  C)] 99.9  F (37.7  C)  Heart Rate:  [113-156] 113  Resp:  [14-24] 14  BP: (108-114)/(58-71) 114/71  MAP:  [67 mmHg-90 mmHg] 85 mmHg  Arterial Line BP: ()/(53-77) 115/72  FiO2 (%):  [30 %] 30 %  SpO2:  [98 %-100 %] 99 %  Wt Readings from Last 2 Encounters:   05/31/20 93.8 kg (206 lb 12.7 oz)     I/O last 3 completed shifts:  In: 3420.3 [I.V.:525.3; NG/GT:1575]  Out: 2405 [Urine:1855; Emesis/NG output:550]      GENERAL APPEARANCE: Intubated. NAD. Non purposeful movements   HEENT: No icterus, PERRL 2 mm, ETT in place, NJ tube in place  CARDIOVASCULAR: sinus tachycardia, normal S1 and S2, no S3 or S4 and no murmur, click or rub. Normal PMI. Pulses dopplerable.  RESP: Clear bilaterally. Mechanical ventilation.    GASTRO: Soft, bowel " sounds hypoactive but present  GENITOURINARY: Silva in place.  EXTREMITIES: Warm, no edema, pulses dopplered.  NEURO: Intubated, Pupils equal and reactive, 2 mm. Moving extremities and opening eyes although not following commands. Withdraws to pain.    INTEGUMENTARY: No rashes. Line sites CDI. Bilaterally groin sites CDI; right groin ecchymotic, incision intact.   LINES/TUBES/DRAINS: R radial Arterial line. ETT. OG. NJ. Silva Catheter.          Data:     Vent Settings:  Resp: 14 SpO2: 99 % O2 Device: Mechanical Ventilator      Arterial Blood Gas:   Recent Labs   Lab 05/31/20  0853 05/29/20  0431 05/28/20  0605 05/28/20  0414   PH 7.48* 7.50* 7.47* 7.49*   PCO2 34* 35 36 34*   PO2 116* 137* 141* 61*   HCO3 26 27 26 26   O2PER 30 35.0 40 30       Vitals:    05/29/20 0400 05/30/20 0000 05/31/20 0400   Weight: 89.4 kg (197 lb 1.5 oz) 93.6 kg (206 lb 5.6 oz) 93.8 kg (206 lb 12.7 oz)   I/O last 3 completed shifts:  In: 3420.3 [I.V.:525.3; NG/GT:1575]  Out: 2405 [Urine:1855; Emesis/NG output:550]  Recent Labs   Lab 05/31/20  0323 05/30/20  1651    143   POTASSIUM 3.8 4.1   CHLORIDE 111* 109   CO2 25 25   ANIONGAP 6 8   * 118*   BUN 37* 42*   CR 0.87 1.06   TEN 8.8 8.6     No components found for: URINE   Recent Labs   Lab 05/31/20  0323 05/30/20  0343 05/29/20  0444   AST 56* 62* 74*   * 138* 177*   BILITOTAL 0.6 0.7 0.7   ALBUMIN 2.8* 2.9* 2.9*   PROTTOTAL 7.6 7.6 7.6   ALKPHOS 126 116 120     Temp: 99.9  F (37.7  C) Temp src: BladderTemp  Min: 99.1  F (37.3  C)  Max: 101.1  F (38.4  C)   Recent Labs   Lab 05/31/20 0323 05/30/20  0343 05/29/20  0444 05/28/20  1640 05/27/20  0312   WBC 14.4* 16.0* 15.8* 16.6* 14.9*   HGB 8.9* 8.6* 8.6* 9.0* 8.3*   HCT 28.7* 28.0* 27.9* 29.9* 27.9*   MCV 99 98 100 100 101*   RDW 15.4* 15.5* 15.6* 15.7* 16.1*   * 517* 484* 475* 348     No lab results found in last 7 days.  Recent Labs   Lab 05/31/20 0323 05/30/20  1651 05/30/20 0343 05/29/20  0444 05/28/20  0422    * 118* 116* 128* 116*       All imaging personally reviewed:  Recent Results (from the past 24 hour(s))   Echocardiogram Complete    Narrative    [id number]  [id number]  [id number]  [id number]^GRIS^YOSHI           [facility],[place]  Echocardiography Laboratory  [address] [place] [id number]     Name: FAINA FORRESTER  MRN: [id number]  : 1980  Study Date: 2020 08:16 AM  Age: 39 yrs  Gender: Male  Patient Location: [facility]  Reason For Study: Cardiac Arrest  Ordering Physician: YOSHI LANDRY  Performed By: Denisse Johnson RDCS     BSA: 2.1 m2  Height: 66 in  Weight: 231 lb  BP: 132/58 mmHg  _____________________________________________________________________________  __        Procedure  Complete Portable Echo Adult. Technically difficult study.Extremely poor  acoustic windows.  _____________________________________________________________________________  __        Interpretation Summary  VA ECMO at 3.7L/min. Technically difficult study. Extremely poor acoustic  windows.  Severely (EF <30%) reduced left ventricular function on extremely limited  views.  The right ventricle cannot be assessed.  No pericardial effusion is present.  Previous study not available for comparison.  _____________________________________________________________________________  __        Left Ventricle  Left ventricular wall thickness cannot evaluate. Left ventricular size is  normal. Severely (EF <30%) reduced left ventricular function is present. Left  ventricular diastolic function is not assessable. Severe diffuse hypokinesis  is present.     Right Ventricle  The right ventricle cannot be assessed. Right ventricular function cannot be  assessed due to poor image quality.     Mitral Valve  The mitral valve cannot be assessed.        Aortic Valve  The aortic valve opens with each cardiac cycle. On Doppler interrogation,  there is no significant stenosis or regurgitation.     Tricuspid Valve  The  tricuspid valve cannot be assessed. Pulmonary artery systolic pressure  cannot be assessed.     Pulmonic Valve  The pulmonic valve cannot be assessed.     Vessels  The aorta root cannot be assessed. The thoracic aorta cannot be assessed.  Venous ECMO cannula is visualized traversing the IVC.     Pericardium  No pericardial effusion is present.     Compared to Previous Study  Previous study not available for comparison.     _____________________________________________________________________________  __                       Report approved by: Ashlee Izquierdo 05/18/2020 09:18 AM                 _____________________________________________________________________________  __                Medications     Current Facility-Administered Medications   Medication     0.9% sodium chloride BOLUS     0.9% sodium chloride BOLUS     acetaminophen (TYLENOL) tablet 650 mg     acetylcysteine (MUCOMYST) 10 % nebulizer solution 4 mL     albuterol (PROVENTIL) neb solution 2.5 mg     ampicillin-sulbactam (UNASYN) 3 g vial to attach to  mL bag     artificial tears ophthalmic ointment     aspirin (ASA) chewable tablet 81 mg     calcium chloride in  mL intermittent infusion 1 g     chlorhexidine (PERIDEX) 0.12 % solution 15 mL     dextrose 10% infusion     glucose gel 15-30 g    Or     dextrose 50 % injection 25-50 mL    Or     glucagon injection 1 mg     fentaNYL (PF) (SUBLIMAZE) injection 50 mcg     heparin ANTICOAGULANT injection 5,000 Units     heparin lock flush 10 UNIT/ML injection 5-10 mL     heparin lock flush 10 UNIT/ML injection 5-10 mL     hydrALAZINE (APRESOLINE) injection 10 mg     ipratropium - albuterol 0.5 mg/2.5 mg/3 mL (DUONEB) neb solution 3 mL     levalbuterol (XOPENEX) neb solution 1.25 mg     levETIRAcetam (KEPPRA) intermittent infusion 1,000 mg     lidocaine (LMX4) cream     lidocaine 1 % 0.1-1 mL     magnesium sulfate 2 g in water intermittent infusion     magnesium sulfate 4 g in 100 mL  sterile water (premade)     metoprolol tartrate (LOPRESSOR) half-tab 25 mg     midazolam (VERSED) drip - ADULT 100 mg/100 mL in NS PRE-MIX     multivitamins w/minerals (CERTAVITE) liquid 15 mL     naloxone (NARCAN) injection 0.1-0.4 mg     oxyCODONE (ROXICODONE) tablet 5 mg     pantoprazole (PROTONIX) 2 mg/mL suspension 40 mg     potassium chloride (KLOR-CON) Packet 20-40 mEq     potassium chloride 10 mEq in 100 mL intermittent infusion with 10 mg lidocaine     potassium chloride 10 mEq in 100 mL sterile water intermittent infusion (premix)     potassium chloride 20 mEq in 50 mL intermittent infusion     potassium chloride ER (KLOR-CON M) CR tablet 20-40 mEq     QUEtiapine (SEROquel) tablet 25 mg     senna-docusate (SENOKOT-S/PERICOLACE) 8.6-50 MG per tablet 1 tablet     sodium chloride (NEBUSAL) 3 % neb solution 3 mL     sodium chloride (PF) 0.9% PF flush 10-20 mL     sodium chloride (PF) 0.9% PF flush 3 mL     sodium chloride (PF) 0.9% PF flush 3 mL     sodium phosphate 10 mmol in D5W intermittent infusion     sodium phosphate 15 mmol in D5W intermittent infusion     sodium phosphate 20 mmol in D5W intermittent infusion     sodium phosphate 25 mmol in D5W intermittent infusion     ticagrelor (BRILINTA) tablet 90 mg

## 2020-05-31 NOTE — PLAN OF CARE
ICU End of Shift Summary. See flowsheets for vital signs and detailed assessment.    Changes this shift: VSS. Remains tachycardic in 120s.  Not following commands or tracking.  Continues to have low-grade fever, on scheduled tylenol. One stool this shift. Good urine output.     Plan: Continue with POC, update MD as needed.

## 2020-05-31 NOTE — PROGRESS NOTES
ENT Progress Note  5/31/20    Events  Bite block was placed around tube yesterday. Charge RN was able to confirm that no gauze was placed in the mouth from at least 7am on Friday.    Objective  Vital signs:  Temp: 99.1  F (37.3  C) Temp src: Bladder     Heart Rate: 125 Resp: 24   Gen: laying in bed sedated, turns head side to side constantly  Resp: Normal SaO2 on mechanical ventilation via ETT  HEENT: orotracheal ETT positioned slightly to the side, still splaying the anterior tongue wound. Tongue wound appears stable when compared to prior descriptions. No active bleeding. Xeroform gauze removed.    Assessment  Scot Bishop is a 39 year old male who had an MI with asystole, PEA and V-fiib requiring advance resuscitative effort and intubation s/p ECMO and PCI who now has a 1.5 - 2cm pressure/erosive wound to his anterior central tongue likely from the ETT tube and ETT tube bite block which were resting directly on the area despite lateral movement of the tube on the head gear. This wound is not likely to be a malignancy given the time course and prior multiple photographs of his tongue (by WOCN) since admission.    His tongue appears about the same as prior exams. There is no documentation that the xeroform was replaced on the tongue after it was intially placed on 5/27/20.    Plan  - Keep pressure off the tongue as much as possible. DO NOT allow ETT to rest directly on anterior tongue. Frequently move ETT from side to side to decrease tongue pressure.   - Place vaseline guaze or xeroform on anterior tongue wound at all times. ONLY PLACE ONE LONG PIECE AND ENSURE DISTAL END IS OUT OF THE MOUTH to prevent aspiration of guaze. Tape the distal end to the face  - Peridex rinses QID  - Recommend placing a soft bite block to reduce pressure on tongue if safe. Please place between molars laterally (avoid placing anteriorly). Can use rolled up gauze and tape or obtain an actual bite block from OR/central supplies  -  Early extubation/ trach if possible to decrease pressure on tongue  - We will await care conference on Tuesday to determine if surgical intervention will be necessary vs healing by secondary intention  - It is unclear if anyone replaced the vaseline gauze on the tongue. The charge RN is calling past nurses to ensure that no gauze was lost in the mouth. If it turns out that a piece is missing, please call ENT    Patient discussed with Dr. Franky Mills MD  Otolaryngology-Head & Neck Surgery PGY3  Please contact ENT with questions by dialing [phone] and entering job code [phone] when prompted.

## 2020-05-31 NOTE — PROGRESS NOTES
INTERIM REPORT of Video-EEG Monitoring              DATE OF RECORDIN2020         I reviewed the continuing video-EEG monitoring of Scot Bishop.         The EEG during coma is abnormal due to generalized delta-theta slowing, with increasing faster alpha-beta activities of lower amplitude bilaterally.  No interictal epileptiform abnormalities and no electrographic seizures have been observed.    Prolonged periods of complex, repetitive but overall non-stereotyped movements are uncharacteristic of epileptic seizures.       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    German John M.D., [facility] [phone]

## 2020-05-31 NOTE — PLAN OF CARE
ICU End of Shift Summary. See flowsheets for vital signs and detailed assessment.    Changes this shift: Per EEG, activity witnessed yesterday not likely seizure-related, versed gtt off per MD at 2000. No seizure-like activity noted overnight, no neuro progression. Consistently sinus tach 120s, normotensive. Low grade fevers, scheduled tylenol. Continues to have strong cough with moderate secretions. Rectal pouch replaced for loose stools. K replaced.     Plan:  Continue to monitor and update team of changes.   Problem: Airway Clearance Ineffective  Goal: Effective Airway Clearance  5/31/2020 0648 by David Elkins, RN  Outcome: No Change

## 2020-06-01 ENCOUNTER — APPOINTMENT (OUTPATIENT)
Dept: GENERAL RADIOLOGY | Facility: CLINIC | Age: 40
End: 2020-06-01
Attending: STUDENT IN AN ORGANIZED HEALTH CARE EDUCATION/TRAINING PROGRAM
Payer: MEDICAID

## 2020-06-01 ENCOUNTER — APPOINTMENT (OUTPATIENT)
Dept: OCCUPATIONAL THERAPY | Facility: CLINIC | Age: 40
End: 2020-06-01
Payer: MEDICAID

## 2020-06-01 LAB
ALBUMIN UR-MCNC: 30 MG/DL
ANION GAP SERPL CALCULATED.3IONS-SCNC: 6 MMOL/L (ref 3–14)
APPEARANCE UR: CLEAR
BILIRUB UR QL STRIP: NEGATIVE
BUN SERPL-MCNC: 33 MG/DL (ref 7–30)
C DIFF TOX B STL QL: NEGATIVE
CALCIUM SERPL-MCNC: 8.8 MG/DL (ref 8.5–10.1)
CHLORIDE SERPL-SCNC: 110 MMOL/L (ref 94–109)
CO2 SERPL-SCNC: 26 MMOL/L (ref 20–32)
COLOR UR AUTO: YELLOW
CREAT SERPL-MCNC: 0.79 MG/DL (ref 0.66–1.25)
ERYTHROCYTE [DISTWIDTH] IN BLOOD BY AUTOMATED COUNT: 15.5 % (ref 10–15)
GFR SERPL CREATININE-BSD FRML MDRD: >90 ML/MIN/{1.73_M2}
GLUCOSE SERPL-MCNC: 117 MG/DL (ref 70–99)
GLUCOSE UR STRIP-MCNC: NEGATIVE MG/DL
HCT VFR BLD AUTO: 28.2 % (ref 40–53)
HGB BLD-MCNC: 8.6 G/DL (ref 13.3–17.7)
HGB UR QL STRIP: ABNORMAL
KETONES UR STRIP-MCNC: NEGATIVE MG/DL
LACTATE BLD-SCNC: 1 MMOL/L (ref 0.7–2)
LEUKOCYTE ESTERASE UR QL STRIP: ABNORMAL
MCH RBC QN AUTO: 30.3 PG (ref 26.5–33)
MCHC RBC AUTO-ENTMCNC: 30.5 G/DL (ref 31.5–36.5)
MCV RBC AUTO: 99 FL (ref 78–100)
MUCOUS THREADS #/AREA URNS LPF: PRESENT /LPF
NITRATE UR QL: NEGATIVE
PH UR STRIP: 6.5 PH (ref 5–7)
PLATELET # BLD AUTO: 574 10E9/L (ref 150–450)
POTASSIUM SERPL-SCNC: 3.6 MMOL/L (ref 3.4–5.3)
RBC # BLD AUTO: 2.84 10E12/L (ref 4.4–5.9)
RBC #/AREA URNS AUTO: 19 /HPF (ref 0–2)
SODIUM SERPL-SCNC: 143 MMOL/L (ref 133–144)
SOURCE: ABNORMAL
SP GR UR STRIP: 1.03 (ref 1–1.03)
SPECIMEN SOURCE: NORMAL
SQUAMOUS #/AREA URNS AUTO: <1 /HPF (ref 0–1)
TRANS CELLS #/AREA URNS HPF: <1 /HPF (ref 0–1)
TROPONIN I SERPL-MCNC: 0.1 UG/L (ref 0–0.04)
UROBILINOGEN UR STRIP-MCNC: NORMAL MG/DL (ref 0–2)
WBC # BLD AUTO: 11.4 10E9/L (ref 4–11)
WBC #/AREA URNS AUTO: 3 /HPF (ref 0–5)

## 2020-06-01 PROCEDURE — 36415 COLL VENOUS BLD VENIPUNCTURE: CPT | Performed by: STUDENT IN AN ORGANIZED HEALTH CARE EDUCATION/TRAINING PROGRAM

## 2020-06-01 PROCEDURE — 99291 CRITICAL CARE FIRST HOUR: CPT | Performed by: INTERNAL MEDICINE

## 2020-06-01 PROCEDURE — 25000132 ZZH RX MED GY IP 250 OP 250 PS 637: Performed by: INTERNAL MEDICINE

## 2020-06-01 PROCEDURE — 71045 X-RAY EXAM CHEST 1 VIEW: CPT

## 2020-06-01 PROCEDURE — 87800 DETECT AGNT MULT DNA DIREC: CPT | Performed by: STUDENT IN AN ORGANIZED HEALTH CARE EDUCATION/TRAINING PROGRAM

## 2020-06-01 PROCEDURE — 25000132 ZZH RX MED GY IP 250 OP 250 PS 637: Performed by: STUDENT IN AN ORGANIZED HEALTH CARE EDUCATION/TRAINING PROGRAM

## 2020-06-01 PROCEDURE — 85027 COMPLETE CBC AUTOMATED: CPT | Performed by: NURSE PRACTITIONER

## 2020-06-01 PROCEDURE — 97110 THERAPEUTIC EXERCISES: CPT | Mod: GO

## 2020-06-01 PROCEDURE — 25000128 H RX IP 250 OP 636: Performed by: STUDENT IN AN ORGANIZED HEALTH CARE EDUCATION/TRAINING PROGRAM

## 2020-06-01 PROCEDURE — 20000004 ZZH R&B ICU UMMC

## 2020-06-01 PROCEDURE — 94640 AIRWAY INHALATION TREATMENT: CPT | Mod: 76

## 2020-06-01 PROCEDURE — 25000125 ZZHC RX 250: Performed by: NURSE PRACTITIONER

## 2020-06-01 PROCEDURE — 25000125 ZZHC RX 250

## 2020-06-01 PROCEDURE — 40000014 ZZH STATISTIC ARTERIAL MONITORING DAILY

## 2020-06-01 PROCEDURE — 94003 VENT MGMT INPAT SUBQ DAY: CPT

## 2020-06-01 PROCEDURE — 25000128 H RX IP 250 OP 636: Performed by: NURSE PRACTITIONER

## 2020-06-01 PROCEDURE — 27210437 ZZH NUTRITION PRODUCT SEMIELEM INTERMED LITER

## 2020-06-01 PROCEDURE — 94640 AIRWAY INHALATION TREATMENT: CPT

## 2020-06-01 PROCEDURE — 25000132 ZZH RX MED GY IP 250 OP 250 PS 637

## 2020-06-01 PROCEDURE — 87040 BLOOD CULTURE FOR BACTERIA: CPT | Performed by: STUDENT IN AN ORGANIZED HEALTH CARE EDUCATION/TRAINING PROGRAM

## 2020-06-01 PROCEDURE — 80048 BASIC METABOLIC PNL TOTAL CA: CPT | Performed by: NURSE PRACTITIONER

## 2020-06-01 PROCEDURE — G0463 HOSPITAL OUTPT CLINIC VISIT: HCPCS

## 2020-06-01 PROCEDURE — 87077 CULTURE AEROBIC IDENTIFY: CPT | Performed by: STUDENT IN AN ORGANIZED HEALTH CARE EDUCATION/TRAINING PROGRAM

## 2020-06-01 PROCEDURE — 99207 ZZC APP CREDIT; MD BILLING SHARED VISIT: CPT | Performed by: NURSE PRACTITIONER

## 2020-06-01 PROCEDURE — 87493 C DIFF AMPLIFIED PROBE: CPT | Performed by: NURSE PRACTITIONER

## 2020-06-01 PROCEDURE — 25000132 ZZH RX MED GY IP 250 OP 250 PS 637: Performed by: NURSE PRACTITIONER

## 2020-06-01 PROCEDURE — 87186 SC STD MICRODIL/AGAR DIL: CPT | Performed by: STUDENT IN AN ORGANIZED HEALTH CARE EDUCATION/TRAINING PROGRAM

## 2020-06-01 PROCEDURE — 81001 URINALYSIS AUTO W/SCOPE: CPT | Performed by: STUDENT IN AN ORGANIZED HEALTH CARE EDUCATION/TRAINING PROGRAM

## 2020-06-01 PROCEDURE — 40000275 ZZH STATISTIC RCP TIME EA 10 MIN

## 2020-06-01 PROCEDURE — 84484 ASSAY OF TROPONIN QUANT: CPT | Performed by: NURSE PRACTITIONER

## 2020-06-01 PROCEDURE — 99207 ZZC NO CHARGE LOS: CPT | Performed by: INTERNAL MEDICINE

## 2020-06-01 PROCEDURE — 83605 ASSAY OF LACTIC ACID: CPT | Performed by: NURSE PRACTITIONER

## 2020-06-01 RX ORDER — ACETYLCYSTEINE 100 MG/ML
4 SOLUTION ORAL; RESPIRATORY (INHALATION) EVERY 4 HOURS PRN
Status: DISCONTINUED | OUTPATIENT
Start: 2020-06-01 | End: 2020-06-25 | Stop reason: HOSPADM

## 2020-06-01 RX ORDER — LEVETIRACETAM 100 MG/ML
1000 SOLUTION ORAL 2 TIMES DAILY
Status: DISCONTINUED | OUTPATIENT
Start: 2020-06-01 | End: 2020-06-25 | Stop reason: HOSPADM

## 2020-06-01 RX ORDER — LEVALBUTEROL INHALATION SOLUTION 1.25 MG/3ML
1.25 SOLUTION RESPIRATORY (INHALATION) EVERY 4 HOURS PRN
Status: DISCONTINUED | OUTPATIENT
Start: 2020-06-01 | End: 2020-06-10

## 2020-06-01 RX ADMIN — AMPICILLIN SODIUM AND SULBACTAM SODIUM 3 G: 2; 1 INJECTION, POWDER, FOR SOLUTION INTRAMUSCULAR; INTRAVENOUS at 21:47

## 2020-06-01 RX ADMIN — HEPARIN SODIUM 5000 UNITS: 5000 INJECTION, SOLUTION INTRAVENOUS; SUBCUTANEOUS at 05:41

## 2020-06-01 RX ADMIN — MULTIVITAMIN 15 ML: LIQUID ORAL at 08:44

## 2020-06-01 RX ADMIN — AMPICILLIN SODIUM AND SULBACTAM SODIUM 3 G: 2; 1 INJECTION, POWDER, FOR SOLUTION INTRAMUSCULAR; INTRAVENOUS at 14:14

## 2020-06-01 RX ADMIN — CHLORHEXIDINE GLUCONATE 0.12% ORAL RINSE 15 ML: 1.2 LIQUID ORAL at 08:44

## 2020-06-01 RX ADMIN — AMPICILLIN SODIUM AND SULBACTAM SODIUM 3 G: 2; 1 INJECTION, POWDER, FOR SOLUTION INTRAMUSCULAR; INTRAVENOUS at 08:44

## 2020-06-01 RX ADMIN — ACETAMINOPHEN 650 MG: 325 TABLET ORAL at 05:41

## 2020-06-01 RX ADMIN — LEVETIRACETAM 1000 MG: 100 SOLUTION ORAL at 21:47

## 2020-06-01 RX ADMIN — Medication 40 MG: at 21:47

## 2020-06-01 RX ADMIN — ACETYLCYSTEINE 4 ML: 100 SOLUTION ORAL; RESPIRATORY (INHALATION) at 04:31

## 2020-06-01 RX ADMIN — ACETYLCYSTEINE 4 ML: 100 SOLUTION ORAL; RESPIRATORY (INHALATION) at 07:56

## 2020-06-01 RX ADMIN — Medication 40 MG: at 08:44

## 2020-06-01 RX ADMIN — HEPARIN SODIUM 5000 UNITS: 5000 INJECTION, SOLUTION INTRAVENOUS; SUBCUTANEOUS at 13:54

## 2020-06-01 RX ADMIN — LEVETIRACETAM 1000 MG: 100 SOLUTION ORAL at 11:09

## 2020-06-01 RX ADMIN — HEPARIN SODIUM 5000 UNITS: 5000 INJECTION, SOLUTION INTRAVENOUS; SUBCUTANEOUS at 21:54

## 2020-06-01 RX ADMIN — POTASSIUM CHLORIDE 20 MEQ: 1.5 POWDER, FOR SOLUTION ORAL at 05:41

## 2020-06-01 RX ADMIN — CHLORHEXIDINE GLUCONATE 0.12% ORAL RINSE 15 ML: 1.2 LIQUID ORAL at 19:56

## 2020-06-01 RX ADMIN — Medication 25 MG: at 20:01

## 2020-06-01 RX ADMIN — LEVALBUTEROL HYDROCHLORIDE 1.25 MG: 1.25 SOLUTION RESPIRATORY (INHALATION) at 07:56

## 2020-06-01 RX ADMIN — TICAGRELOR 90 MG: 90 TABLET ORAL at 08:44

## 2020-06-01 RX ADMIN — ACETAMINOPHEN 650 MG: 325 TABLET ORAL at 11:10

## 2020-06-01 RX ADMIN — Medication 25 MG: at 08:44

## 2020-06-01 RX ADMIN — ASPIRIN 81 MG CHEWABLE TABLET 81 MG: 81 TABLET CHEWABLE at 08:44

## 2020-06-01 RX ADMIN — AMPICILLIN SODIUM AND SULBACTAM SODIUM 3 G: 2; 1 INJECTION, POWDER, FOR SOLUTION INTRAMUSCULAR; INTRAVENOUS at 03:44

## 2020-06-01 RX ADMIN — LEVALBUTEROL HYDROCHLORIDE 1.25 MG: 1.25 SOLUTION RESPIRATORY (INHALATION) at 04:31

## 2020-06-01 RX ADMIN — CHLORHEXIDINE GLUCONATE 0.12% ORAL RINSE 15 ML: 1.2 LIQUID ORAL at 11:10

## 2020-06-01 RX ADMIN — TICAGRELOR 90 MG: 90 TABLET ORAL at 20:01

## 2020-06-01 RX ADMIN — CHLORHEXIDINE GLUCONATE 0.12% ORAL RINSE 15 ML: 1.2 LIQUID ORAL at 18:05

## 2020-06-01 RX ADMIN — ACETAMINOPHEN 650 MG: 325 TABLET ORAL at 18:05

## 2020-06-01 RX ADMIN — QUETIAPINE FUMARATE 25 MG: 25 TABLET ORAL at 20:01

## 2020-06-01 ASSESSMENT — ACTIVITIES OF DAILY LIVING (ADL)
ADLS_ACUITY_SCORE: 16

## 2020-06-01 ASSESSMENT — MIFFLIN-ST. JEOR: SCORE: 1788.75

## 2020-06-01 NOTE — PROVIDER NOTIFICATION
CSI service notified of 14 beat run Vtach; BP dropped per arterial line; self-limiting and continued in Sinus tach with stable BP after event

## 2020-06-01 NOTE — PLAN OF CARE
ICU End of Shift Summary. See flowsheets for vital signs and detailed assessment.    Changes this shift: Neuro unchanged; pupils are 5mm, sluggish but reactive; tolerated PS all shift, strong nonproductive cough; hemodynamically stable tachycardia with one 14 beat run Vtach, BP stable; Tmax 101.0 today; cultures drawn, CXR complete; matson removed and rectal tube in place- C diff sent and came back negative    Plan: Continue to assess neuro function; care conference tomorrow at 2pm for discussion of trach/peg

## 2020-06-01 NOTE — PLAN OF CARE
ICU End of Shift Summary. See flowsheets for vital signs and detailed assessment.    Changes this shift: No neurological progression; opening eyes, moving head and occasionally posturing in upper extremities. No tracking or following commands. Sinus tach 120s, normotensive. Strong excessive cough, moderate secretions. Rectal tube placed for loose stools. K replaced.     Plan: Continue current POC, update team of changes.       Problem: Airway Clearance Ineffective  Goal: Effective Airway Clearance  6/1/2020 0531 by David Elkins, RN  Outcome: No Change

## 2020-06-01 NOTE — PROGRESS NOTES
[facility] Nurse Inpatient Pressure Injury Assessment   Reason for consultation: Evaluate and treat tongue      ASSESSMENT  Pressure Injury: on left top of tongue, hospital acquired    This is a Medical Device Related Pressure Injury (MDRPI) due to ETT  Pressure Injury is Stage Mucosal   Contributing factor of the pressure injury: pressure, microclimate and immobility  Status: Significantly deteriorated now has split tongue 5/27. Stable since 5/27/2020.  Recommend provider order: ENT now following     TREATMENT PLAN  Left top of tongue mucosal pressure injury: Good oral hygiene and routine repositioning of ETT (ensure tongue is disengaged with every reposition), attempt to not reposition ETT on wound. Offload tubing with rolled towel.    ENT recommendations:   - Keep pressure off the tongue as much as possible. DO NOT allow ETT to rest directly on anterior tongue. Frequently move ETT from side to side to decrease tongue pressure.   - Place vaseline guaze or xeroform on anterior tongue wound at all times. ONLY PLACE ONE LONG PIECE AND ENSURE DISTAL END IS OUT OF THE MOUTH to prevent aspiration of guaze  - Peridex rinses QID  - Can place soft bite block between molars laterally (avoid placing anteriorly). Can use rolled up guaze and tape or obtain an actual bite block from OR/central supplies  - Early extubation/ trach if possible to decrease pressure on tongue  - We will await care conference on Friday to determine if surgical intervention will be necessary vs healing by secondary intention    Orders Updated  WO Nurse follow-up plan:twice weekly  Nursing to notify the Provider(s) and re-consult the [facility] Nurse if wound(s) deteriorates or new skin concern.    Patient History  According to provider note(s):  Scot Bishop is a 39 year old male who was admitted on 5/17/2020 for cardiac arrest. Pt reportedly had chest pain that started on 5/16/20. He was using Drano on his pipes at home and did not initially seek medical  attention for CP and SOB since it said on the box that Drano can cause those symptoms. He took a shower and had syncopal episode after coming out of the shower. EMS was called; initial rhythm asystole. With chest compressions pt eventually had PEA and then eventually had VF in the field. After multiple cycles of epinephrine had ROSC. He was intubated in the field.   Pt was brought to [facility] ED where he regained a pulse and was brought to the Cath Lab for coronary angiogram. Initially, BP was 90s/60s as he was being transported from ED but he had recurrent cardiac arrest on arrival to Cath Lab. ECG showed ST elevation in aVR. He was promptly placed on VA ECMO. He had thrombotic occlusion of proximal LAD and underwent successful aspiration thrombectomy and PCI w/ NAZIA of proximal and mid LAD.     Objective Data  Containment of urine/stool: Indwelling catheter    Current Diet/ Nutrition:  Orders Placed This Encounter      NPO for Medical/Clinical Reasons Except for: NPO but receiving Tube Feeding      Output:   I/O last 3 completed shifts:  In: 3262 [I.V.:502; NG/GT:1440]  Out: 2375 [Urine:1850; Emesis/NG output:225; Stool:300]    Risk Assessment:   Sensory Perception: 2-->very limited  Moisture: 3-->occasionally moist  Activity: 1-->bedfast  Mobility: 2-->very limited  Nutrition: 3-->adequate  Friction and Shear: 1-->problem  Burak Score: 12      Labs:   Recent Labs   Lab 06/01/20  0349 05/31/20  0323   ALBUMIN  --  2.8*   HGB 8.6* 8.9*   WBC 11.4* 14.4*       Physical Exam  Skin inspection: focused tongue    Wound Location:  Left top of tongue     5/22 5/22 5/27 6/1 tongue                                                         Date of last Photo 5/29/20  Wound History: Nurse had thought WOC was already following this wound, however WOC had only noted bite wound previously on underside of tongue. On 5/22 patient had bite on right side of  tongue  5/27 Patient's tongue is now split. RN notified WOC and WOC was coming for routine assessment. Updated team, recommended ENT involvement. Team was already coordinating a care conference for family. Updated nursing manager. Per RN, have been doing all of the interventions recommended including have been offloading ETT tubing with rolled towel.  Patient seen thrashing head side to side. Per RN this has been patient's baseline for the last couple days. Patient also has very strong cough and very strong bite.  WOC requested RT readjust ETT stabilizer to see if we can offload up any higher on tubing. RT, RN, and WOC applied new ETT stabilizer and no noticeable difference in offloading.  Measurements (length x width x depth, in cm) unable to measure how far back wound goes.   Wound Base:  Tongue split, also appears to have missing tissue, white tongue tissue across tip of tongue extending back 1.5 cm  Palpation of the wound bed: normal   Periwound skin: mucosa  Color: pale  Temperature: normal   Drainage:, none  Description of drainage: none  Odor: none  Pain: patient trashing head throughout visit unclear if any pain,       Interventions  Current support surface: Standard  Low air loss mattress  Current off-loading measures: Pillows  Repositioning aid: Pillows  Visual inspection of wound(s) completed   Tube Securement: ETT stabilizer  Wound Care: was done per plan of care.  Supplies: none  Educated provided: importance of repositioning, plan of care and wound progress  Education provided to: nurse  Discussed importance of:repositioning every 2 hours, off-loading pressure to wound and their role in pressure injury prevention  Discussed plan of care with Nurse,     Cristal Muse RN, CWOCN

## 2020-06-01 NOTE — PLAN OF CARE
OT/4C - Discharge Planner OT   Patient plan for discharge: not stated  Current status: Monitored vitals and attempted ROM, however HR elevated outside of 130 bpm parameter per chart review prior to activity (137 bpm). Pt thrashing head back and forth. Terminated session and notified RN.  Barriers to return to prior living situation: medical status  Recommendations for discharge: TCU/LTACH  Rationale for recommendations: to maximize ADL independence and safety        Entered by: Elizabeth Soni 06/01/2020 8:33 AM

## 2020-06-01 NOTE — PROGRESS NOTES
Care Coordinator Progress Note    Admission Date/Time:  5/17/2020  Attending MD:  Jessica, Cardiologist, *    Data  Chart reviewed, discussed with interdisciplinary team.   Patient was admitted for:    ST elevation MI (STEMI) (H)  Cardiac arrest (H)  Cardiogenic shock (H)    Assessment:  Pt remain in ICU vented and doing PS.  Pt is moving all extremities but not to command.  CSI team requested care conf. arranged for tomorrow, 6/2 with the family and all the providers to provide update and discuss the plan of care.    Coordination of Care:  Care conf. Arranged for tomorrow, 6/2 at 2:00.  RNCC contacted pt fatherGerald # [phone] and discussed about the care conf.  Pt father agreed to have phone conference tomorrow at 2:00.  RNCC provided conference call in number.  Pt father agreed to share the number to all the family members.  Team members that have been informed about the care conf. are; CSI ( Kala Chiang, NP # [id number]), Neuro ICU ( Dr. Orellana # [id number]), Palliative ( Dr. Bowden # [id number] and Wendy MARQUEZ # [id number]), Unit SW ( Bibi # [id number]), and Bedside RN, Catarina.     Plan  Anticipated Discharge Date:  TBD.  Anticipated Discharge Plan:   TBD.  Care conf. arranged for tomorrow, 6/2/20 at 2:00 in 4C conf. Room.  Providers will meet at 1:45 for providers meeting prior calling family.  RNCC will cont to follow plan of care.      Verito Ortiz RN, PHN, BSN  4A and 4E/ ICU  Care Coordinator  Phone: [phone]  Pager: [phone]

## 2020-06-01 NOTE — PROGRESS NOTES
Cardiology Progress Note  Scot Bishop MRN: [id number]  Age: 39 year old, : 1980  Date: 2020            Assessment and Plan:     Scot Bishop is a 39 year old male who was admitted on 2020 for cardiac arrest. Pt reportedly had chest pain that started on 20. He was using Drano on his pipes at home and did not initially seek medical attention for CP and SOB since it said on the box that Drano can cause those symptoms. He took a shower and had syncopal episode after coming out of the shower. EMS was called; initial rhythm asystole. With chest compressions pt eventually had PEA and then eventually had VF in the field. After multiple cycles of epinephrine had ROSC. He was intubated in the field.   Pt was brought to [facility] ED where he regained a pulse and was brought to the Cath Lab for coronary angiogram. Initially, BP was 90s/60s as he was being transported from ED but he had recurrent cardiac arrest on arrival to Cath Lab. ECG showed ST elevation in aVR. He was promptly placed on VA ECMO. He had thrombotic occlusion of proximal LAD and underwent successful aspiration thrombectomy and PCI w/ NAZIA of proximal and mid LAD.     Interval events: moves all extremities and neck/head but not to command. Ongoing sinus tachycardia. Pressure supporting for several hours.     Today's plan:   -change albuterol and mucomyst nebs to PRN   -continue pressure support   -C diff culture   -lasix as needed to keep even   -care conference tomorrow      Neurology: Intubated. Initial CT head negative. Cooled to 34 degrees on arrival; rewarmed  AM. EEG  showed severe diffuse encephalopathy without seizures or epileptiform discharges.  Repeat CTH : multifocal acute/subacute cerebral infarcts   Brain MRI : diffuse acute/subacute infarcts in bilat cerebral hemispheres, corpus callosum, and periventricular white matter c/w evolving diffuse hypoxic ischemic brain injury    Episode of LUE posturing and rhythmic tremors overnight on 5/27. EEG restarted 5/28.  -EEG: moderate to severe encephalopathy, no seizures   -off sedation; fentanyl bumps and PO oxycodone as needed for signs of pain   -seroquel 25 mg PM  -Neurocrit following; appreciate their recs    Cardiovascular / Hemodynamics: Refractory VF arrest    S/p NAZIA to proximal-mid LAD  Sinus tachycardia   Peripheral VA ECMO inserted for cardiac arrest. LA 16 initially. Suspect ongoing tachycardia d/t evolving MI and post-arrest state. ECMO decannulation at bedside on 5/19; IABP discontinued 5/20. 23 second run on VT overnight on 5/27.   TTE 5/26/20: EF 35-40%, RV normal   EKG 5/29: sinus tachycardia, QTc 439.   -continue ASA 81mg daily and ticagrelor 90mg BID  -continue metoprolol tartrate 25 mg BID   -hold statin for now given likely hepatic injury during arrest  -hold ACE/ARB for now given likely reduced renal fxn after arrest   Pulmonary: Acute hypoxic respiratory failure  COVID negative  Admission CT chest showed bilateral consolidations c/w aspiration PNA. Had thick secretions that required bagging by RT on 5/20. Bronchoscopy 5/21. Increasing oxygen requirements and pt-vent dyssynchrony on 5/22. Pulmonary re-consulted, infectious work up, pt sedated. Significant pressure injury of tongue w/ splitting of tongue noted by [facility] nurse today. No active bleeding.    Vent settings this AM: PS 7/7 30%  ABG 5/31: 7.48/34/116/26   CXR 5/28: pulmonary edema, slightly improved. Lines in stable position.   -ENT consult 5/27 for pressure injury of tongue   -PST as tolerated; pressure supported for several hours overnight  -change mucomyst and albuterol nebs to PRN   -growing GNR in sputum 5/22; continue unasyn (5/22 - 6/5)      -CXR as needed   -ABGs PRN    GI and Nutrition: Shock liver  GIB, resolved   , AST 56. T bili 0.6.    -post-pyloric FT placement 5/26  -monitor LFTs daily   -continue TF   -bowel regimen discontinued given loose  "stools  -C diff culture today    -GI Prophylaxis: Protonix BID for UGIB   Renal, Fluid and Electrolytes: Acute kidney injury, resolved    Cr improved to 0.79 this AM. 2L UOP and net positive 800 mL yesterday after lasix 40 mg IV x 1. Na 143 today.   -FWF 50 mL every other hour   -lasix as needed to keep even   -monitor I/O  -maintain K>3.8 and Mg>2    Infectious Disease: Aspiration pneumonia  Leukocytosis  WBC 11.4, tmax 100.4F overnight. Blood cultures negative thus far. Grew GNR, acinetobacter (not baumannii) in sputum cx. Blood and sputum cx from yesterday unchanged.   -vancomycin/zosyn x5 days (5/17-5/22) for asp PNA   -discontinued meropenem changed to unasyn 5/29  -unasyn through 6/5 for GNR in sputum  -scheduled acetaminophen for fevers   -C diff culture today     -monitor for signs of infection given lines and leukocytosis   Hematology and Oncology: Receiving ASA/ticagrelor for NAZIA. Transfused 1 unit PRBCs 5/21. Hgb 8.6 today. No signs of active bleeding. Plts uptrending - 574 today.   -daily CBC   -transfuse for Hgb<7   -DVT PPX: subcutaneous heparin    Endocrinology: No known medical history. BG elevated.  -not requiring insulin gtt  -HgbA1c 5.2   Lines:       R radial arterial line May 17, 2020  ETT May 17, 2020  Silva catheter May 17, 2020  OG tube May 17, 2020  NJ tube May 26, 2020   Restraint: mitts    Current lines are required for patient management       Family update by me today: Yes     Code Status: Full code     The pt was discussed and evaluated with Dr. Matamoros, attending physician, who agrees with the assessment and plan above.     Kala Chiang, DNP APRN CNP          Objective     /71   Temp 99.1  F (37.3  C)   Resp 15   Ht 1.676 m (5' 6\")   Wt 93.6 kg (206 lb 5.6 oz)   SpO2 100%   BMI 33.31 kg/m    Temp:  [99  F (37.2  C)-100.4  F (38  C)] 99.1  F (37.3  C)  Heart Rate:  [110-142] 142  Resp:  [13-22] 15  BP: (108-114)/(58-71) 114/71  MAP:  [74 mmHg-96 mmHg] 96 mmHg  Arterial " Line BP: ()/(60-81) 132/77  FiO2 (%):  [30 %] 30 %  SpO2:  [98 %-100 %] 100 %  Wt Readings from Last 2 Encounters:   06/01/20 93.6 kg (206 lb 5.6 oz)     I/O last 3 completed shifts:  In: 3332 [I.V.:592; NG/GT:1420]  Out: 2530 [Urine:2030; Emesis/NG output:300; Stool:200]      GENERAL APPEARANCE: Intubated. NAD.  HEENT: No icterus, PERRL 2 mm, ETT in place, NJ tube in place  CARDIOVASCULAR: sinus tachycardia, normal S1 and S2, no S3 or S4 and no murmur, click or rub. Normal PMI. Pulses dopplerable.  RESP: Clear bilaterally. Mechanical ventilation.    GASTRO: Soft, bowel sounds active.  GENITOURINARY: Silva in place.  EXTREMITIES: Warm, no edema, pulses dopplered.  NEURO: Intubated, Pupils equal and reactive, 2 mm. Moving extremities and opening eyes although not following commands. Withdraws to pain.    INTEGUMENTARY: No rashes. Line sites CDI. Bilaterally groin sites CDI; right groin ecchymotic, incision intact.   LINES/TUBES/DRAINS: R radial Arterial line. ETT. OG. NJ. Silva Catheter.          Data:     Vent Settings:  Resp: 15 SpO2: 100 % O2 Device: Mechanical Ventilator      Arterial Blood Gas:   Recent Labs   Lab 05/31/20  0853 05/29/20  0431 05/28/20  0605 05/28/20  0414   PH 7.48* 7.50* 7.47* 7.49*   PCO2 34* 35 36 34*   PO2 116* 137* 141* 61*   HCO3 26 27 26 26   O2PER 30 35.0 40 30       Vitals:    05/30/20 0000 05/31/20 0400 06/01/20 0600   Weight: 93.6 kg (206 lb 5.6 oz) 93.8 kg (206 lb 12.7 oz) 93.6 kg (206 lb 5.6 oz)   I/O last 3 completed shifts:  In: 3332 [I.V.:592; NG/GT:1420]  Out: 2530 [Urine:2030; Emesis/NG output:300; Stool:200]  Recent Labs   Lab 06/01/20  0349 05/31/20  0323    142   POTASSIUM 3.6 3.8   CHLORIDE 110* 111*   CO2 26 25   ANIONGAP 6 6   * 120*   BUN 33* 37*   CR 0.79 0.87   TEN 8.8 8.8     No components found for: URINE   Recent Labs   Lab 05/31/20  0323 05/30/20  0343 05/29/20  0444   AST 56* 62* 74*   * 138* 177*   BILITOTAL 0.6 0.7 0.7   ALBUMIN 2.8*  2.9* 2.9*   PROTTOTAL 7.6 7.6 7.6   ALKPHOS 126 116 120     Temp: 99.1  F (37.3  C) Temp src: BladderTemp  Min: 99  F (37.2  C)  Max: 100.4  F (38  C)   Recent Labs   Lab 20  0323 20  0343 20  0444 20  1640   WBC 11.4* 14.4* 16.0* 15.8* 16.6*   HGB 8.6* 8.9* 8.6* 8.6* 9.0*   HCT 28.2* 28.7* 28.0* 27.9* 29.9*   MCV 99 99 98 100 100   RDW 15.5* 15.4* 15.5* 15.6* 15.7*   * 545* 517* 484* 475*     No lab results found in last 7 days.  Recent Labs   Lab 20  0323 05/30/20  1651 20  0343 20  0444   * 120* 118* 116* 128*       All imaging personally reviewed:  Recent Results (from the past 24 hour(s))   Echocardiogram Complete    Narrative    [id number]  [id number]  [id number]  [id number]^GRIS^YOSHI           [facility],[place]  Echocardiography Laboratory  [address] [place] [id number]     Name: FAINA FORRESTER  MRN: [id number]  : 1980  Study Date: 2020 08:16 AM  Age: 39 yrs  Gender: Male  Patient [facility]: [facility]  Reason For Study: Cardiac Arrest  Ordering Physician: YOSHI LANDRY  Performed By: Denisse Johnson RDCS     BSA: 2.1 m2  Height: 66 in  Weight: 231 lb  BP: 132/58 mmHg  _____________________________________________________________________________  __        Procedure  Complete Portable Echo Adult. Technically difficult study.Extremely poor  acoustic windows.  _____________________________________________________________________________  __        Interpretation Summary  VA ECMO at 3.7L/min. Technically difficult study. Extremely poor acoustic  windows.  Severely (EF <30%) reduced left ventricular function on extremely limited  views.  The right ventricle cannot be assessed.  No pericardial effusion is present.  Previous study not available for comparison.  _____________________________________________________________________________  __        Left Ventricle  Left ventricular wall thickness  cannot evaluate. Left ventricular size is  normal. Severely (EF <30%) reduced left ventricular function is present. Left  ventricular diastolic function is not assessable. Severe diffuse hypokinesis  is present.     Right Ventricle  The right ventricle cannot be assessed. Right ventricular function cannot be  assessed due to poor image quality.     Mitral Valve  The mitral valve cannot be assessed.        Aortic Valve  The aortic valve opens with each cardiac cycle. On Doppler interrogation,  there is no significant stenosis or regurgitation.     Tricuspid Valve  The tricuspid valve cannot be assessed. Pulmonary artery systolic pressure  cannot be assessed.     Pulmonic Valve  The pulmonic valve cannot be assessed.     Vessels  The aorta root cannot be assessed. The thoracic aorta cannot be assessed.  Venous ECMO cannula is visualized traversing the IVC.     Pericardium  No pericardial effusion is present.     Compared to Previous Study  Previous study not available for comparison.     _____________________________________________________________________________  __                       Report approved by: Ashlee Izquierdo 05/18/2020 09:18 AM                 _____________________________________________________________________________  __                Medications     Current Facility-Administered Medications   Medication     0.9% sodium chloride BOLUS     0.9% sodium chloride BOLUS     acetaminophen (TYLENOL) tablet 650 mg     acetylcysteine (MUCOMYST) 10 % nebulizer solution 4 mL     albuterol (PROVENTIL) neb solution 2.5 mg     ampicillin-sulbactam (UNASYN) 3 g vial to attach to  mL bag     artificial tears ophthalmic ointment     aspirin (ASA) chewable tablet 81 mg     calcium chloride in  mL intermittent infusion 1 g     chlorhexidine (PERIDEX) 0.12 % solution 15 mL     dextrose 10% infusion     glucose gel 15-30 g    Or     dextrose 50 % injection 25-50 mL    Or     glucagon injection 1 mg      fentaNYL (PF) (SUBLIMAZE) injection 50 mcg     heparin ANTICOAGULANT injection 5,000 Units     heparin lock flush 10 UNIT/ML injection 5-10 mL     heparin lock flush 10 UNIT/ML injection 5-10 mL     hydrALAZINE (APRESOLINE) injection 10 mg     ipratropium - albuterol 0.5 mg/2.5 mg/3 mL (DUONEB) neb solution 3 mL     levalbuterol (XOPENEX) neb solution 1.25 mg     levETIRAcetam (KEPPRA) intermittent infusion 1,000 mg     lidocaine (LMX4) cream     lidocaine 1 % 0.1-1 mL     magnesium sulfate 2 g in water intermittent infusion     magnesium sulfate 4 g in 100 mL sterile water (premade)     metoprolol tartrate (LOPRESSOR) half-tab 25 mg     midazolam (VERSED) drip - ADULT 100 mg/100 mL in NS PRE-MIX     multivitamins w/minerals (CERTAVITE) liquid 15 mL     naloxone (NARCAN) injection 0.1-0.4 mg     oxyCODONE (ROXICODONE) tablet 5 mg     pantoprazole (PROTONIX) 2 mg/mL suspension 40 mg     potassium chloride (KLOR-CON) Packet 20-40 mEq     potassium chloride 10 mEq in 100 mL intermittent infusion with 10 mg lidocaine     potassium chloride 10 mEq in 100 mL sterile water intermittent infusion (premix)     potassium chloride 20 mEq in 50 mL intermittent infusion     potassium chloride ER (KLOR-CON M) CR tablet 20-40 mEq     QUEtiapine (SEROquel) tablet 25 mg     senna-docusate (SENOKOT-S/PERICOLACE) 8.6-50 MG per tablet 1 tablet     sodium chloride (NEBUSAL) 3 % neb solution 3 mL     sodium chloride (PF) 0.9% PF flush 10-20 mL     sodium chloride (PF) 0.9% PF flush 3 mL     sodium chloride (PF) 0.9% PF flush 3 mL     sodium phosphate 10 mmol in D5W intermittent infusion     sodium phosphate 15 mmol in D5W intermittent infusion     sodium phosphate 20 mmol in D5W intermittent infusion     sodium phosphate 25 mmol in D5W intermittent infusion     ticagrelor (BRILINTA) tablet 90 mg

## 2020-06-02 LAB
ALBUMIN SERPL-MCNC: 2.8 G/DL (ref 3.4–5)
ALP SERPL-CCNC: 124 U/L (ref 40–150)
ALT SERPL W P-5'-P-CCNC: 114 U/L (ref 0–70)
ANION GAP SERPL CALCULATED.3IONS-SCNC: 5 MMOL/L (ref 3–14)
AST SERPL W P-5'-P-CCNC: 66 U/L (ref 0–45)
BACTERIA SPEC CULT: NO GROWTH
BASOPHILS # BLD AUTO: 0.1 10E9/L (ref 0–0.2)
BASOPHILS NFR BLD AUTO: 0.5 %
BILIRUB SERPL-MCNC: 0.5 MG/DL (ref 0.2–1.3)
BUN SERPL-MCNC: 30 MG/DL (ref 7–30)
CALCIUM SERPL-MCNC: 8.7 MG/DL (ref 8.5–10.1)
CHLORIDE SERPL-SCNC: 112 MMOL/L (ref 94–109)
CO2 SERPL-SCNC: 25 MMOL/L (ref 20–32)
CREAT SERPL-MCNC: 0.84 MG/DL (ref 0.66–1.25)
DIFFERENTIAL METHOD BLD: ABNORMAL
EOSINOPHIL # BLD AUTO: 0.6 10E9/L (ref 0–0.7)
EOSINOPHIL NFR BLD AUTO: 4.1 %
ERYTHROCYTE [DISTWIDTH] IN BLOOD BY AUTOMATED COUNT: 15.3 % (ref 10–15)
ERYTHROCYTE [DISTWIDTH] IN BLOOD BY AUTOMATED COUNT: 15.4 % (ref 10–15)
GFR SERPL CREATININE-BSD FRML MDRD: >90 ML/MIN/{1.73_M2}
GLUCOSE BLDC GLUCOMTR-MCNC: 131 MG/DL (ref 70–99)
GLUCOSE SERPL-MCNC: 115 MG/DL (ref 70–99)
GRAM STN SPEC: ABNORMAL
GRAM STN SPEC: ABNORMAL
HCT VFR BLD AUTO: 27.8 % (ref 40–53)
HCT VFR BLD AUTO: 29.3 % (ref 40–53)
HGB BLD-MCNC: 8.5 G/DL (ref 13.3–17.7)
HGB BLD-MCNC: 9 G/DL (ref 13.3–17.7)
IMM GRANULOCYTES # BLD: 0.2 10E9/L (ref 0–0.4)
IMM GRANULOCYTES NFR BLD: 1.1 %
LYMPHOCYTES # BLD AUTO: 2 10E9/L (ref 0.8–5.3)
LYMPHOCYTES NFR BLD AUTO: 14.2 %
MCH RBC QN AUTO: 30.2 PG (ref 26.5–33)
MCH RBC QN AUTO: 30.3 PG (ref 26.5–33)
MCHC RBC AUTO-ENTMCNC: 30.6 G/DL (ref 31.5–36.5)
MCHC RBC AUTO-ENTMCNC: 30.7 G/DL (ref 31.5–36.5)
MCV RBC AUTO: 99 FL (ref 78–100)
MCV RBC AUTO: 99 FL (ref 78–100)
MONOCYTES # BLD AUTO: 1.3 10E9/L (ref 0–1.3)
MONOCYTES NFR BLD AUTO: 9.5 %
NEUTROPHILS # BLD AUTO: 10 10E9/L (ref 1.6–8.3)
NEUTROPHILS NFR BLD AUTO: 70.6 %
NRBC # BLD AUTO: 0 10*3/UL
NRBC BLD AUTO-RTO: 0 /100
PLATELET # BLD AUTO: 614 10E9/L (ref 150–450)
PLATELET # BLD AUTO: 642 10E9/L (ref 150–450)
POTASSIUM SERPL-SCNC: 3.7 MMOL/L (ref 3.4–5.3)
PROT SERPL-MCNC: 7.6 G/DL (ref 6.8–8.8)
RBC # BLD AUTO: 2.81 10E12/L (ref 4.4–5.9)
RBC # BLD AUTO: 2.97 10E12/L (ref 4.4–5.9)
RETICS # AUTO: 231.7 10E9/L (ref 25–95)
RETICS/RBC NFR AUTO: 7.8 % (ref 0.5–2)
SODIUM SERPL-SCNC: 142 MMOL/L (ref 133–144)
SPECIMEN SOURCE: ABNORMAL
SPECIMEN SOURCE: NORMAL
WBC # BLD AUTO: 12.6 10E9/L (ref 4–11)
WBC # BLD AUTO: 14.1 10E9/L (ref 4–11)

## 2020-06-02 PROCEDURE — 87205 SMEAR GRAM STAIN: CPT | Performed by: NURSE PRACTITIONER

## 2020-06-02 PROCEDURE — 25000128 H RX IP 250 OP 636: Performed by: STUDENT IN AN ORGANIZED HEALTH CARE EDUCATION/TRAINING PROGRAM

## 2020-06-02 PROCEDURE — 40000611 ZZHCL STATISTIC MORPHOLOGY W/INTERP HEMEPATH TC 85060: Performed by: NURSE PRACTITIONER

## 2020-06-02 PROCEDURE — 25000128 H RX IP 250 OP 636: Performed by: NURSE PRACTITIONER

## 2020-06-02 PROCEDURE — 25000132 ZZH RX MED GY IP 250 OP 250 PS 637: Performed by: INTERNAL MEDICINE

## 2020-06-02 PROCEDURE — 20000004 ZZH R&B ICU UMMC

## 2020-06-02 PROCEDURE — 99207 ZZC APP CREDIT; MD BILLING SHARED VISIT: CPT | Performed by: NURSE PRACTITIONER

## 2020-06-02 PROCEDURE — 87077 CULTURE AEROBIC IDENTIFY: CPT | Performed by: NURSE PRACTITIONER

## 2020-06-02 PROCEDURE — 40000275 ZZH STATISTIC RCP TIME EA 10 MIN

## 2020-06-02 PROCEDURE — 87070 CULTURE OTHR SPECIMN AEROBIC: CPT | Performed by: NURSE PRACTITIONER

## 2020-06-02 PROCEDURE — 25000132 ZZH RX MED GY IP 250 OP 250 PS 637: Performed by: STUDENT IN AN ORGANIZED HEALTH CARE EDUCATION/TRAINING PROGRAM

## 2020-06-02 PROCEDURE — 99233 SBSQ HOSP IP/OBS HIGH 50: CPT | Performed by: INTERNAL MEDICINE

## 2020-06-02 PROCEDURE — 00000146 ZZHCL STATISTIC GLUCOSE BY METER IP

## 2020-06-02 PROCEDURE — 80053 COMPREHEN METABOLIC PANEL: CPT | Performed by: NURSE PRACTITIONER

## 2020-06-02 PROCEDURE — 94003 VENT MGMT INPAT SUBQ DAY: CPT

## 2020-06-02 PROCEDURE — 87106 FUNGI IDENTIFICATION YEAST: CPT | Performed by: NURSE PRACTITIONER

## 2020-06-02 PROCEDURE — 25000132 ZZH RX MED GY IP 250 OP 250 PS 637

## 2020-06-02 PROCEDURE — 27210437 ZZH NUTRITION PRODUCT SEMIELEM INTERMED LITER

## 2020-06-02 PROCEDURE — 25000132 ZZH RX MED GY IP 250 OP 250 PS 637: Performed by: NURSE PRACTITIONER

## 2020-06-02 PROCEDURE — 40000014 ZZH STATISTIC ARTERIAL MONITORING DAILY

## 2020-06-02 PROCEDURE — 85045 AUTOMATED RETICULOCYTE COUNT: CPT | Performed by: NURSE PRACTITIONER

## 2020-06-02 PROCEDURE — 85025 COMPLETE CBC W/AUTO DIFF WBC: CPT | Performed by: NURSE PRACTITIONER

## 2020-06-02 PROCEDURE — 87186 SC STD MICRODIL/AGAR DIL: CPT | Performed by: NURSE PRACTITIONER

## 2020-06-02 PROCEDURE — 85027 COMPLETE CBC AUTOMATED: CPT | Performed by: NURSE PRACTITIONER

## 2020-06-02 RX ORDER — FUROSEMIDE 10 MG/ML
40 INJECTION INTRAMUSCULAR; INTRAVENOUS ONCE
Status: COMPLETED | OUTPATIENT
Start: 2020-06-02 | End: 2020-06-02

## 2020-06-02 RX ADMIN — ACETAMINOPHEN 650 MG: 325 TABLET ORAL at 00:43

## 2020-06-02 RX ADMIN — ACETAMINOPHEN 650 MG: 325 TABLET ORAL at 07:55

## 2020-06-02 RX ADMIN — CHLORHEXIDINE GLUCONATE 0.12% ORAL RINSE 15 ML: 1.2 LIQUID ORAL at 12:51

## 2020-06-02 RX ADMIN — AMPICILLIN SODIUM AND SULBACTAM SODIUM 3 G: 2; 1 INJECTION, POWDER, FOR SOLUTION INTRAMUSCULAR; INTRAVENOUS at 14:39

## 2020-06-02 RX ADMIN — HEPARIN SODIUM 5000 UNITS: 5000 INJECTION, SOLUTION INTRAVENOUS; SUBCUTANEOUS at 22:08

## 2020-06-02 RX ADMIN — MULTIVITAMIN 15 ML: LIQUID ORAL at 07:55

## 2020-06-02 RX ADMIN — Medication 5 ML: at 21:07

## 2020-06-02 RX ADMIN — ASPIRIN 81 MG CHEWABLE TABLET 81 MG: 81 TABLET CHEWABLE at 07:55

## 2020-06-02 RX ADMIN — ACETAMINOPHEN 650 MG: 325 TABLET ORAL at 12:51

## 2020-06-02 RX ADMIN — Medication 40 MG: at 21:07

## 2020-06-02 RX ADMIN — HEPARIN SODIUM 5000 UNITS: 5000 INJECTION, SOLUTION INTRAVENOUS; SUBCUTANEOUS at 07:55

## 2020-06-02 RX ADMIN — Medication 25 MG: at 07:55

## 2020-06-02 RX ADMIN — ACETAMINOPHEN 650 MG: 325 TABLET ORAL at 21:03

## 2020-06-02 RX ADMIN — HEPARIN SODIUM 5000 UNITS: 5000 INJECTION, SOLUTION INTRAVENOUS; SUBCUTANEOUS at 13:21

## 2020-06-02 RX ADMIN — AMPICILLIN SODIUM AND SULBACTAM SODIUM 3 G: 2; 1 INJECTION, POWDER, FOR SOLUTION INTRAMUSCULAR; INTRAVENOUS at 21:08

## 2020-06-02 RX ADMIN — Medication 40 MG: at 09:33

## 2020-06-02 RX ADMIN — LEVETIRACETAM 1000 MG: 100 SOLUTION ORAL at 07:57

## 2020-06-02 RX ADMIN — QUETIAPINE FUMARATE 25 MG: 25 TABLET ORAL at 21:04

## 2020-06-02 RX ADMIN — Medication 25 MG: at 21:04

## 2020-06-02 RX ADMIN — FUROSEMIDE 40 MG: 10 INJECTION, SOLUTION INTRAVENOUS at 07:54

## 2020-06-02 RX ADMIN — LEVETIRACETAM 1000 MG: 100 SOLUTION ORAL at 21:06

## 2020-06-02 RX ADMIN — CHLORHEXIDINE GLUCONATE 0.12% ORAL RINSE 15 ML: 1.2 LIQUID ORAL at 07:55

## 2020-06-02 RX ADMIN — CHLORHEXIDINE GLUCONATE 0.12% ORAL RINSE 15 ML: 1.2 LIQUID ORAL at 21:03

## 2020-06-02 RX ADMIN — CHLORHEXIDINE GLUCONATE 0.12% ORAL RINSE 15 ML: 1.2 LIQUID ORAL at 17:46

## 2020-06-02 RX ADMIN — AMPICILLIN SODIUM AND SULBACTAM SODIUM 3 G: 2; 1 INJECTION, POWDER, FOR SOLUTION INTRAMUSCULAR; INTRAVENOUS at 09:28

## 2020-06-02 RX ADMIN — TICAGRELOR 90 MG: 90 TABLET ORAL at 21:04

## 2020-06-02 RX ADMIN — TICAGRELOR 90 MG: 90 TABLET ORAL at 07:55

## 2020-06-02 RX ADMIN — AMPICILLIN SODIUM AND SULBACTAM SODIUM 3 G: 2; 1 INJECTION, POWDER, FOR SOLUTION INTRAMUSCULAR; INTRAVENOUS at 03:53

## 2020-06-02 ASSESSMENT — ACTIVITIES OF DAILY LIVING (ADL)
ADLS_ACUITY_SCORE: 16
ADLS_ACUITY_SCORE: 18
ADLS_ACUITY_SCORE: 16
ADLS_ACUITY_SCORE: 18

## 2020-06-02 NOTE — PROGRESS NOTES
Cardiology Progress Note  Scot Bishop MRN: [id number]  Age: 39 year old, : 1980  Date: 2020            Assessment and Plan:     Scot Bishop is a 39 year old male who was admitted on 2020 for cardiac arrest. Pt reportedly had chest pain that started on 20. He was using Drano on his pipes at home and did not initially seek medical attention for CP and SOB since it said on the box that Drano can cause those symptoms. He took a shower and had syncopal episode after coming out of the shower. EMS was called; initial rhythm asystole. With chest compressions pt eventually had PEA and then eventually had VF in the field. After multiple cycles of epinephrine had ROSC. He was intubated in the field.   Pt was brought to [facility] ED where he regained a pulse and was brought to the Cath Lab for coronary angiogram. Initially, BP was 90s/60s as he was being transported from ED but he had recurrent cardiac arrest on arrival to Cath Lab. ECG showed ST elevation in aVR. He was promptly placed on VA ECMO. He had thrombotic occlusion of proximal LAD and underwent successful aspiration thrombectomy and PCI w/ NAZAI of proximal and mid LAD.     Interval events: febrile to 101F yesterday. Cultures redrawn, CXR repeated which was negative for signs of infection. C diff negative yesterday.     Today's plan:   -diuresis to keep even   -scheduled acetaminophen   -care conference at 1400 to discuss trach/PEG     Neurology: Intubated. Initial CT head negative. Cooled to 34 degrees on arrival; rewarmed  AM. EEG  showed severe diffuse encephalopathy without seizures or epileptiform discharges.  Repeat CTH : multifocal acute/subacute cerebral infarcts   Brain MRI : diffuse acute/subacute infarcts in bilat cerebral hemispheres, corpus callosum, and periventricular white matter c/w evolving diffuse hypoxic ischemic brain injury   Episode of LUE posturing and rhythmic tremors  overnight on 5/27. EEG restarted 5/28.  -EEG discontinued per Neuro    -off sedation; fentanyl bumps and PO oxycodone as needed for signs of pain   -seroquel 25 mg PM  -Neurocrit following; appreciate their recs    Cardiovascular / Hemodynamics: Refractory VF arrest    S/p NAZIA to proximal-mid LAD  Sinus tachycardia   Peripheral VA ECMO inserted for cardiac arrest. LA 16 initially. Suspect ongoing tachycardia d/t evolving MI and post-arrest state. ECMO decannulation at bedside on 5/19; IABP discontinued 5/20. 23 second run on VT overnight on 5/27.   TTE 5/26/20: EF 35-40%, RV normal   EKG 5/29: sinus tachycardia, QTc 439.   -continue ASA 81mg daily and ticagrelor 90mg BID  -continue metoprolol tartrate 25 mg BID   -hold statin for now given likely hepatic injury during arrest  -hold ACE/ARB for now given likely reduced renal fxn after arrest   Pulmonary: Acute hypoxic respiratory failure  COVID negative  Admission CT chest showed bilateral consolidations c/w aspiration PNA. Had thick secretions that required bagging by RT on 5/20. Bronchoscopy 5/21. Increasing oxygen requirements and pt-vent dyssynchrony on 5/22. Pulmonary re-consulted, infectious work up, pt sedated. Significant pressure injury of tongue w/ splitting of tongue noted by [facility] nurse today. No active bleeding.    Vent settings this AM: PS 7/7 30%  ABG 5/31: 7.48/34/116/26   CXR 6/1: no edema or signs of infection. Lines in stable position.   -ENT consult 5/27 for pressure injury of tongue   -PST as tolerated; pressure supported for several hours overnight  -mucomyst and albuterol nebs to PRN   -growing GNR in sputum 5/22; continue unasyn (5/22 - 6/5)      -CXR as needed   -ABGs PRN    GI and Nutrition: Shock liver  GIB, resolved   , AST 66. T bili 0.5.    -post-pyloric FT placement 5/26  -monitor LFTs daily   -continue TF   -bowel regimen discontinued given loose stools  -C diff culture 6/1 negative    -GI Prophylaxis: Protonix BID for UGIB  "  Renal, Fluid and Electrolytes: Acute kidney injury, resolved    Cr improved to 0.84 this AM. 1.5L UOP and net positive 950 mL yesterday. Na 142 today.   -FWF 50 mL every other hour   -lasix 40 mg IV today to keep even   -monitor I/O  -maintain K>3.8 and Mg>2    Infectious Disease: Aspiration pneumonia  Leukocytosis  WBC 12.6, tmax 101F yesterday evening. Blood cultures negative thus far. Grew GNR, acinetobacter (not baumannii) in sputum cx. Blood and sputum cx from yesterday unchanged.   -vancomycin/zosyn x5 days (5/17-5/22) for asp PNA   -discontinued meropenem changed to unasyn 5/29  -unasyn through 6/5 for GNR in sputum  -scheduled acetaminophen for fevers   -C diff culture 6/1 negative      -monitor for signs of infection given lines and leukocytosis   Hematology and Oncology: Thrombocytosis  Plts uptrending - 614 today. Likely reactive given ongoing acinetobacter infxn and recent ECMO. Receiving ASA/ticagrelor for NAZIA. Transfused 1 unit PRBCs 5/21. Hgb 8.5 today. No signs of active bleeding.    -peripheral smear   -daily CBC   -transfuse for Hgb<7   -DVT PPX: subcutaneous heparin    Endocrinology: No known medical history. BG elevated.  -not requiring insulin gtt  -HgbA1c 5.2   Lines:       R radial arterial line May 17, 2020  ETT May 17, 2020  OG tube May 17, 2020  NJ tube May 26, 2020   Restraint: mitts    Current lines are required for patient management       Family update by me today: Yes     Code Status: Full code     The pt was discussed and evaluated with Dr. Alanis, attending physician, who agrees with the assessment and plan above.     Kala Chiang, DNP APRN CNP          Objective     /71   Temp 100.7  F (38.2  C) (Axillary)   Resp 22   Ht 1.676 m (5' 6\")   Wt 93.6 kg (206 lb 5.6 oz)   SpO2 99%   BMI 33.31 kg/m    Temp:  [99.5  F (37.5  C)-101.4  F (38.6  C)] 100.7  F (38.2  C)  Heart Rate:  [116-137] 120  Resp:  [18-23] 22  MAP:  [77 mmHg-103 mmHg] 77 mmHg  Arterial Line BP: " (102-139)/(59-85) 102/59  FiO2 (%):  [30 %] 30 %  SpO2:  [97 %-100 %] 99 %  Wt Readings from Last 2 Encounters:   06/01/20 93.6 kg (206 lb 5.6 oz)     I/O last 3 completed shifts:  In: 3142 [I.V.:412; NG/GT:1410]  Out: 2190 [Urine:1490; Emesis/NG output:350; Stool:350]      GENERAL APPEARANCE: Intubated. NAD.  HEENT: No icterus, PERRL 2 mm, ETT in place, NJ tube in place  CARDIOVASCULAR: sinus tachycardia, normal S1 and S2, no S3 or S4 and no murmur, click or rub. Normal PMI. Pulses dopplerable.  RESP: Clear bilaterally. Mechanical ventilation.    GASTRO: Soft, bowel sounds hyperactive.  GENITOURINARY: Condom cath in place.  EXTREMITIES: Warm, no edema, pulses dopplered.  NEURO: Intubated, Pupils equal and reactive, 2 mm. Moving extremities and opening eyes although not following commands. Withdraws to pain.    INTEGUMENTARY: No rashes. Line sites CDI. Bilaterally groin sites CDI; right groin ecchymotic, incision intact.   LINES/TUBES/DRAINS: R radial Arterial line. ETT. OG. NJ.          Data:     Vent Settings:  Resp: 22 SpO2: 99 % O2 Device: Mechanical Ventilator      Arterial Blood Gas:   Recent Labs   Lab 05/31/20  0853 05/29/20  0431 05/28/20  0605 05/28/20  0414   PH 7.48* 7.50* 7.47* 7.49*   PCO2 34* 35 36 34*   PO2 116* 137* 141* 61*   HCO3 26 27 26 26   O2PER 30 35.0 40 30       Vitals:    05/30/20 0000 05/31/20 0400 06/01/20 0600   Weight: 93.6 kg (206 lb 5.6 oz) 93.8 kg (206 lb 12.7 oz) 93.6 kg (206 lb 5.6 oz)   I/O last 3 completed shifts:  In: 3142 [I.V.:412; NG/GT:1410]  Out: 2190 [Urine:1490; Emesis/NG output:350; Stool:350]  Recent Labs   Lab 06/02/20  0345 06/01/20  0349    143   POTASSIUM 3.7 3.6   CHLORIDE 112* 110*   CO2 25 26   ANIONGAP 5 6   * 117*   BUN 30 33*   CR 0.84 0.79   TEN 8.7 8.8     No components found for: URINE   Recent Labs   Lab 06/02/20  0345 05/31/20  0323 05/30/20  0343   AST 66* 56* 62*   * 123* 138*   BILITOTAL 0.5 0.6 0.7   ALBUMIN 2.8* 2.8* 2.9*    PROTTOTAL 7.6 7.6 7.6   ALKPHOS 124 126 116     Temp: 100.7  F (38.2  C) Temp src: AxillaryTemp  Min: 99.5  F (37.5  C)  Max: 101.4  F (38.6  C)   Recent Labs   Lab 20  0349 20  0323 20  0343 20  0444   WBC 12.6* 11.4* 14.4* 16.0* 15.8*   HGB 8.5* 8.6* 8.9* 8.6* 8.6*   HCT 27.8* 28.2* 28.7* 28.0* 27.9*   MCV 99 99 99 98 100   RDW 15.4* 15.5* 15.4* 15.5* 15.6*   * 574* 545* 517* 484*     No lab results found in last 7 days.  Recent Labs   Lab 20  0323 20  1651 20  0343   * 117* 120* 118* 116*       All imaging personally reviewed:  Recent Results (from the past 24 hour(s))   Echocardiogram Complete    Narrative    [id number]  [id number]  [id number]  [id number]^GRIS^YOSHI           [facility],[place]  Echocardiography Laboratory  [address] [place] [id number]     Name: FAINA FORRESTER  MRN: [id number]  : 1980  Study Date: 2020 08:16 AM  Age: 39 yrs  Gender: Male  Patient Location: [facility]  Reason For Study: Cardiac Arrest  Ordering Physician: YOSHI LANDRY  Performed By: Denisse Johnson RDCS     BSA: 2.1 m2  Height: 66 in  Weight: 231 lb  BP: 132/58 mmHg  _____________________________________________________________________________  __        Procedure  Complete Portable Echo Adult. Technically difficult study.Extremely poor  acoustic windows.  _____________________________________________________________________________  __        Interpretation Summary  VA ECMO at 3.7L/min. Technically difficult study. Extremely poor acoustic  windows.  Severely (EF <30%) reduced left ventricular function on extremely limited  views.  The right ventricle cannot be assessed.  No pericardial effusion is present.  Previous study not available for comparison.  _____________________________________________________________________________  __        Left Ventricle  Left ventricular wall thickness  cannot evaluate. Left ventricular size is  normal. Severely (EF <30%) reduced left ventricular function is present. Left  ventricular diastolic function is not assessable. Severe diffuse hypokinesis  is present.     Right Ventricle  The right ventricle cannot be assessed. Right ventricular function cannot be  assessed due to poor image quality.     Mitral Valve  The mitral valve cannot be assessed.        Aortic Valve  The aortic valve opens with each cardiac cycle. On Doppler interrogation,  there is no significant stenosis or regurgitation.     Tricuspid Valve  The tricuspid valve cannot be assessed. Pulmonary artery systolic pressure  cannot be assessed.     Pulmonic Valve  The pulmonic valve cannot be assessed.     Vessels  The aorta root cannot be assessed. The thoracic aorta cannot be assessed.  Venous ECMO cannula is visualized traversing the IVC.     Pericardium  No pericardial effusion is present.     Compared to Previous Study  Previous study not available for comparison.     _____________________________________________________________________________  __                       Report approved by: Ashlee Izquierdo 05/18/2020 09:18 AM                 _____________________________________________________________________________  __                Medications     Current Facility-Administered Medications   Medication     0.9% sodium chloride BOLUS     0.9% sodium chloride BOLUS     acetaminophen (TYLENOL) tablet 650 mg     acetylcysteine (MUCOMYST) 10 % nebulizer solution 4 mL     albuterol (PROVENTIL) neb solution 2.5 mg     ampicillin-sulbactam (UNASYN) 3 g vial to attach to  mL bag     artificial tears ophthalmic ointment     aspirin (ASA) chewable tablet 81 mg     calcium chloride in  mL intermittent infusion 1 g     chlorhexidine (PERIDEX) 0.12 % solution 15 mL     dextrose 10% infusion     glucose gel 15-30 g    Or     dextrose 50 % injection 25-50 mL    Or     glucagon injection 1 mg      fentaNYL (PF) (SUBLIMAZE) injection 50 mcg     heparin ANTICOAGULANT injection 5,000 Units     heparin lock flush 10 UNIT/ML injection 5-10 mL     heparin lock flush 10 UNIT/ML injection 5-10 mL     hydrALAZINE (APRESOLINE) injection 10 mg     ipratropium - albuterol 0.5 mg/2.5 mg/3 mL (DUONEB) neb solution 3 mL     levalbuterol (XOPENEX) neb solution 1.25 mg     levETIRAcetam (KEPPRA) solution 1,000 mg     lidocaine (LMX4) cream     lidocaine 1 % 0.1-1 mL     magnesium sulfate 2 g in water intermittent infusion     magnesium sulfate 4 g in 100 mL sterile water (premade)     metoprolol tartrate (LOPRESSOR) half-tab 25 mg     multivitamins w/minerals (CERTAVITE) liquid 15 mL     naloxone (NARCAN) injection 0.1-0.4 mg     oxyCODONE (ROXICODONE) tablet 5 mg     pantoprazole (PROTONIX) 2 mg/mL suspension 40 mg     potassium chloride (KLOR-CON) Packet 20-40 mEq     potassium chloride 10 mEq in 100 mL intermittent infusion with 10 mg lidocaine     potassium chloride 10 mEq in 100 mL sterile water intermittent infusion (premix)     potassium chloride 20 mEq in 50 mL intermittent infusion     potassium chloride ER (KLOR-CON M) CR tablet 20-40 mEq     QUEtiapine (SEROquel) tablet 25 mg     sodium chloride (NEBUSAL) 3 % neb solution 3 mL     sodium chloride (PF) 0.9% PF flush 10-20 mL     sodium chloride (PF) 0.9% PF flush 3 mL     sodium chloride (PF) 0.9% PF flush 3 mL     sodium phosphate 10 mmol in D5W intermittent infusion     sodium phosphate 15 mmol in D5W intermittent infusion     sodium phosphate 20 mmol in D5W intermittent infusion     sodium phosphate 25 mmol in D5W intermittent infusion     ticagrelor (BRILINTA) tablet 90 mg                      I have seen and examined the patient with the CSI team. I agree with the assessment and plan of the note above.I have reviewed pertinent labs.     Jefferson Alanis MD  Interventional Cardiology  Pager: [phone]

## 2020-06-02 NOTE — PROGRESS NOTES
[facility]  Palliative Care Social Work Note:    Patient Info:  Scot Bishop is a 39 year old male admitted with anterior stemi requiring ecmo. He decanulated on 5/19/20. He has been off of sedation and is not following commands.     Brief summary of visit: Care conference held today by phone. Family was given information about Scot's current condition. Information about the need for trach/peg was also discussed. Family feels that they need time to talk to each other and Gerald is asking if he can come in to see Scot. They are all concerned that if this was the best Scot would be, he wouldn't want to live like this.    PCSW had conversation with Nurse Manager who agreed that allowing Gerald to visit tomorrow meets visitor policy as it's decision about end of life.     PCSW called Gerald. He reports that the family is all getting together tomorrow afternoon. He would like to see Scot tomorrow before the family meeting. He will arrive to the hospital on Wednesday between noon-1pm. He was thankful that we were able to make this plan      Date of Admission: 5/17/2020    Reason for consult: Goals of care  Decisional support  Patient and family support    Sources of information: Family member -dad, step-mom, sister, brother, ex-SO (mom of Scot's children)  Staff CSI, Neuro, Palliative Care, RNCC, Bedside RN, Unit Nurse Manger  Other -chart review    Recommendations & Plan:  -Gerald will be in to see Scot on Wednesday between noon-1pm.   -PCSW will continue to be available for family support as needed.     These recommendations have been discussed with Gerald, medical teams, bedside RN.    Symptoms & Concerns Addressed Today:  Family -Gerald is worried that he can't make any decisions without being able to see Scot.  Physical -Medical teams gave family updates.    Strengths Identified:    -supportive family    Relationships & Support:  Aspects of relationships and support assessed  today:    Identified family members: dad, step-mom, siblings, 2 kids (9 & 6)    Professional supports: medical teams    Family coping: family was appropriately tearful during care conference.     Bereavement Risk concerns: moderate    Goals, Decision Making & Advance Care Planning:   Prognosis, Goals, and/or Advance Care Planning were assessed today: Yes  If yes, brief summary of discussion: Family is making decision regarding trach/peg vs comfort care.   Preferred language: English  Patient's decision making preferences: unable to assess  I have concerns about the patient/family's health literacy today: No  Patient has a completed Health Care Directive: No.   Code status per chart review: full code    Key Palliative Symptom Data:  We are not helping to manage these symptoms currently in this patient.      Clinical Social Work Interventions:   Assessment of palliative specific issues    Attended/participated in care conference  Facilitation of processing of thoughts/feelings  Family communication facilitated  Goals of care discussion/facilitation      ERMELINDA Betancur, [facility]  Palliative Care Clinical   Pager [phone]    [facility] Inpatient Team Consult Pager [phone] Mon-Fri 8-4:30  After hours Answering Service [phone]

## 2020-06-02 NOTE — PROGRESS NOTES
[facility]  Palliative Care Daily Progress Note       Recommendations & Counseling         Decision making: father Pilo is healthcare agent; other support people for him include siblings, his significant other/mother of his 2 children; patient is estranged from his mother    Plan to request permission for father to visit patient to help with decision making around tracheostomy    Care conference summary: ICU, neuro crit care and palliative team and bedside RN and RN CC present, family on phone. Reviewed clinical course and pathways forward with main concern at this time being his neurologic status. At this time the prognosis is uncertain regarding best case scenario and discussion how the extent of his neurologic recovery will not be known for months, up to a year but with the majority of the recovery happening in the first few months. We discussed treatment pathways including comfort care vs trach/peg for prolonged/indefiinite timeline vs a time limited trial of trach/peg with option for transition to comfort care if after a period of time he is not showing the recovery he would want. We reviewed that the decision for trach/peg should happen in the coming days. Family knows that if he remains at current neuro status (not waking up or following commands) that they would not want to keep him on life support. They are going to discuss as a family and request that they can come in to see him to help with the decision making. We offered to ask nursing supervisor to didi permission for family to visit in this context.       Total time spent was 60 minutes,  >50% of time was spent counseling and/or coordination of care regarding goals of care in family care conference for patient with cardiac arrest and anoxic brain injury.    Carmelita Bowden MD / Palliative Medicine / Pager [phone] / [facility] Inpatient Team Consult Pager [phone] (answered 8am-430pm M-F) - ok to text  page via [employer] / After-Hours Answering Service [phone] / Palliative Clinic in the [facility] at the [facility] [place] - [phone] (scheduling); [phone] (triage).            Assessments          40 yo male admitted 5/17 after out of hospital cardiac arrest due to proximal and mid LAD thrombus, s/p PCI, cooling protocol, VA ECMO. Currently intubated, rewarmed and de-cannulated from ECMO since 5/19, off of IABP since 5/20, on antibiotics for aspiration pneumonia, and remains encephalopathic diffuse bialteral infarcts on brain MRI and abnormal EEG.      Today, the patient was seen for:  Out of hospital cardiac arrest  Anoxic brain injury post cardiac arrest    Prognosis, Goals, or Advance Care Planning was addressed today with: Yes.  Mood, coping, and/or meaning in the context of serious illness were addressed today: Yes.  Summary/Comments:            Interval History:     Chart review/discussion with unit or clinical team members:   Ongoing AMS    Per patient or family/caregivers today:  Eyes closed, did not open eyes or follow commands    Key Palliative Symptoms:  We are not helping to manage these symptoms currently in this patient.               Review of Systems:     Unable to obtain          Medications:     I have reviewed this patient's medication profile and medications during this hospitalization.             Physical Exam:     Vitals were reviewed  Patient Vitals for the past 8 hrs:   Temp Temp src Heart Rate SpO2   06/02/20 1600 -- Axillary -- --   06/02/20 1500 -- -- 123 100 %   06/02/20 1400 -- -- 127 96 %   06/02/20 1336 -- -- 118 99 %   06/02/20 1300 -- -- 122 100 %   06/02/20 1217 -- -- -- 100 %   06/02/20 1200 101.6  F (38.7  C) Axillary 121 99 %   06/02/20 1100 -- -- 121 99 %   06/02/20 1000 -- -- 120 99 %     Gen: lying in bed. Intubated, eyes closed, moving head side to side but not purposeful    CV: RRR , Peripheral perfusion intact.   Resp: inutbated, unlabored work of breathing,    Abd: soft, nt, nd, +BS   Msk: no gross deformity, no sarcopenia  Skin:  no jaundice  Ext: warm, well perfused.   Neuro: eyes closed, does not open eyes or follow commands             Data Reviewed:     Reviewed recent pertinent imaging, comments:       Reviewed recent labs, comments:

## 2020-06-02 NOTE — PROGRESS NOTES
CLINICAL NUTRITION SERVICES - REASSESSMENT NOTE     Nutrition Prescription    RECOMMENDATIONS FOR MDs/PROVIDERS TO ORDER:  Fluids and bowel regimen per team     Malnutrition Status:    Non-severe malnutrition in the context of acute on chronic illness    Recommendations already ordered by Registered Dietitian (RD):  None at this time    Future/Additional Recommendations:  Monitor ongoing weight trends   Monitor ongoing GOC/POC     EVALUATION OF THE PROGRESS TOWARD GOALS   Diet: NPO  Nutrition Support: Impact @ 55 ml/hr = 1980 kcals (26), 124 g PRO (1.7), 1016 ml H2O, 84 g Fat (50%  MCTs), 185 g CHO, no Fiber   Access: NDT     Intake:  Average TF intakes over the past 7 days of 830 ml, 1245 (17 kcals/kg), 78 g pro ( 1.1 g/kg pro)     NEW FINDINGS   Weight: 15% wt loss in the past 3 weeks. Cannot rule out fluids, high suspect majority of weight loss 2/2 to fluids. Lowest weight this admit of 89.4 kg on 5/29.:   GI: BM+ noted - C diff culture 6/1 negative    Skin: WOC: L top of tongue - Status: Significantly deteriorated now has split tongue 5/27. Stable since 5/27/2020.  Resp: Intubated/ sedated     -- COVID negative    Dosing weight: 71 kg (adjusted, based on IBW of 64.5 kg and current wt of 89 kg).       Energy needs: 1420 - 1775+ kcal/day (20 - 25+ kcal/kg/day)   Justification: Maintenance, obese   Protein needs: 107 - 142 g protein/day (1.5 - 2 g/kg/day)   Justification: Increased needs, obese     MALNUTRITION  % Intake: < 75% for > 7 days (non-severe)  % Weight Loss: > 5% in 1 month (severe)  Subcutaneous Fat Loss: Unable to assess  Muscle Loss: Unable to assess  Fluid Accumulation/Edema: Unable to assess  Malnutrition Diagnosis: Non-severe malnutrition in the context of acute on chronic illness    Previous Goals   Total avg nutritional intake to meet a minimum of 20 kcal/kg and 1.5 g PRO/kg daily (per dosing wt 72 kg).  Evaluation: Not met    Previous Nutrition Diagnosis  Inadequate protein-energy intake related  to NPO without nutrition support as evidenced by received <50% estimated nutrition needs over past 5 days  Evaluation: Improving    CURRENT NUTRITION DIAGNOSIS  Inadequate protein-energy intake related to inadequate TF infusion as evidenced by TF providin (17 kcals/kg), 78 g pro ( 1.1 g/kg pro) - bot goal       INTERVENTIONS  Implementation  Collaboration with other providers    Goals  Total avg nutritional intake to meet a minimum of 20 kcal/kg and 1.5 g PRO/kg daily (per dosing wt 72 kg).    Monitoring/Evaluation  Progress toward goals will be monitored and evaluated per protocol.      Deandra Troy, RD, MS, LD  SICU: [phone] *[phone]

## 2020-06-02 NOTE — CARE CONFERENCE
Care Conference    Care conference was held on June 2, 2020.  Conference was coordinated by Care Coordinator in the conference room    Attending the conference were:     Family: Father Gerald and his wife Audrey, Sister Carmelita, Mother of pt's children Jacqueline, Brother Igor    CSI: Kala Chiang NP and Dr Cevallos  Neurology: Dr Orellana  Palliative Care: Dr Bowden and Melyssa MARQUEZ  ICU RNCC: Cl Barragan  Bedside RN: Catarina    Purpose of the conference was medical update and goals of care.      Discussion/Outcomes/Follow-Up:     Introductions took place. Kala Chiang provided medical update. Family had the opportunity to ask questions and verbalized understanding of answers provided. Trach/ peg placement was discussed by Kala and Dr Cevallos. Dr Orellana provided update on neurological status and possible trajectory. Family updated that they do not believe that pt would not want to live if he could not go back to living the life that he was living prior to this hospitalization. This writer reviewed the need for LTACH if decision to place trach/peg and pursue aggressive cares was made. Dr Bowden discussed the route change to comfort cares if family made decision to not pursue aggressive cares. Family would like the opportunity to talk as a family without medical care team involvement. The option to stay on the phone was given to family and they would like to discuss off line. Melyssa MARQUEZ will research option for pt's father to come into hospital to see pt and discuss goals of care with medical team. RNCC/SW to follow for family support and care coordination.     Addendum: Melyssa MARQUEZ has arranged for pt's father Gerald to come into hospital tomorrow between 12-1 pm to discuss goals of care with medical team.     Cl Barragan, RN, BSN  ICU Care Coordinator  Pager: [phone]  Phone:  [phone]

## 2020-06-02 NOTE — PROGRESS NOTES
Pt. Remains intubated this shift.  Does not respond to commands.  Continues to thrash head back and forth (side to side).  Low grade temp of 100.7 axillary.  Sinus tach 120s.  Lungs are clear in the upper lobes with crackles in the lower lobes.  Minimal secretions.  Good urine ouptut, good litqued stool output.  Does have a family care conference today at 2:00 PM.

## 2020-06-02 NOTE — PLAN OF CARE
ICU End of Shift Summary. See flowsheets for vital signs and detailed assessment.    Changes this shift: Neuro unchanged; moving all extremities but non-purposeful and not to command; tolerated PS vent settings all day 7/7 with excessively strong cough, minimal secretions, but sputum sent for culture; BP stable, consistent tachycardia and fevers (Tmax: 101.6) all shift; tylenol scheduled; diuresed with good output today; care conference held at 2pm with discussion on trach/peg or measures of comfort; family (dad-Gerald) will be here sometime between noon and 1p to see patient and work towards making a decision on goals of care    Plan:  Continue to follow CSI plan of care    Problem: Adult Inpatient Plan of Care  Goal: Absence of Hospital-Acquired Illness or Injury  Outcome: No Change     Problem: Adjustment to Therapy (Extracorporeal Life Support/ECMO)  Goal: Optimal Adjustment to Therapy  Outcome: No Change     Problem: Bleeding (Extracorporeal Life Support/ECMO)  Goal: Absence of Bleeding  Outcome: No Change     Problem: Airway Clearance Ineffective  Goal: Effective Airway Clearance  Outcome: No Change

## 2020-06-03 ENCOUNTER — APPOINTMENT (OUTPATIENT)
Dept: ULTRASOUND IMAGING | Facility: CLINIC | Age: 40
End: 2020-06-03
Attending: NURSE PRACTITIONER
Payer: MEDICAID

## 2020-06-03 ENCOUNTER — APPOINTMENT (OUTPATIENT)
Dept: CT IMAGING | Facility: CLINIC | Age: 40
End: 2020-06-03
Attending: NURSE PRACTITIONER
Payer: MEDICAID

## 2020-06-03 ENCOUNTER — APPOINTMENT (OUTPATIENT)
Dept: GENERAL RADIOLOGY | Facility: CLINIC | Age: 40
End: 2020-06-03
Attending: NURSE PRACTITIONER
Payer: MEDICAID

## 2020-06-03 ENCOUNTER — APPOINTMENT (OUTPATIENT)
Dept: OCCUPATIONAL THERAPY | Facility: CLINIC | Age: 40
End: 2020-06-03
Payer: MEDICAID

## 2020-06-03 LAB
ALBUMIN SERPL-MCNC: 3 G/DL (ref 3.4–5)
ALBUMIN UR-MCNC: NEGATIVE MG/DL
ALP SERPL-CCNC: 131 U/L (ref 40–150)
ALT SERPL W P-5'-P-CCNC: 123 U/L (ref 0–70)
ANION GAP SERPL CALCULATED.3IONS-SCNC: 6 MMOL/L (ref 3–14)
APPEARANCE UR: CLEAR
AST SERPL W P-5'-P-CCNC: 63 U/L (ref 0–45)
BACTERIA SPEC CULT: NO GROWTH
BACTERIA SPEC CULT: NO GROWTH
BILIRUB SERPL-MCNC: 0.5 MG/DL (ref 0.2–1.3)
BILIRUB UR QL STRIP: NEGATIVE
BUN SERPL-MCNC: 33 MG/DL (ref 7–30)
CALCIUM SERPL-MCNC: 8.8 MG/DL (ref 8.5–10.1)
CHLORIDE SERPL-SCNC: 111 MMOL/L (ref 94–109)
CO2 SERPL-SCNC: 25 MMOL/L (ref 20–32)
COLOR UR AUTO: NORMAL
COPATH REPORT: NORMAL
CREAT SERPL-MCNC: 0.88 MG/DL (ref 0.66–1.25)
D DIMER PPP FEU-MCNC: 8.1 UG/ML FEU (ref 0–0.5)
ERYTHROCYTE [DISTWIDTH] IN BLOOD BY AUTOMATED COUNT: 15.4 % (ref 10–15)
GFR SERPL CREATININE-BSD FRML MDRD: >90 ML/MIN/{1.73_M2}
GLUCOSE BLDC GLUCOMTR-MCNC: 109 MG/DL (ref 70–99)
GLUCOSE BLDC GLUCOMTR-MCNC: 116 MG/DL (ref 70–99)
GLUCOSE BLDC GLUCOMTR-MCNC: 118 MG/DL (ref 70–99)
GLUCOSE BLDC GLUCOMTR-MCNC: 119 MG/DL (ref 70–99)
GLUCOSE SERPL-MCNC: 120 MG/DL (ref 70–99)
GLUCOSE UR STRIP-MCNC: NEGATIVE MG/DL
HCT VFR BLD AUTO: 30.7 % (ref 40–53)
HGB BLD-MCNC: 9.2 G/DL (ref 13.3–17.7)
HGB UR QL STRIP: NEGATIVE
KETONES UR STRIP-MCNC: NEGATIVE MG/DL
LEUKOCYTE ESTERASE UR QL STRIP: NEGATIVE
MAGNESIUM SERPL-MCNC: 2.1 MG/DL (ref 1.6–2.3)
MCH RBC QN AUTO: 29.5 PG (ref 26.5–33)
MCHC RBC AUTO-ENTMCNC: 30 G/DL (ref 31.5–36.5)
MCV RBC AUTO: 98 FL (ref 78–100)
NITRATE UR QL: NEGATIVE
PH UR STRIP: 5.5 PH (ref 5–7)
PHOSPHATE SERPL-MCNC: 3.6 MG/DL (ref 2.5–4.5)
PLATELET # BLD AUTO: 643 10E9/L (ref 150–450)
POTASSIUM SERPL-SCNC: 3.6 MMOL/L (ref 3.4–5.3)
PROT SERPL-MCNC: 8.3 G/DL (ref 6.8–8.8)
RBC # BLD AUTO: 3.12 10E12/L (ref 4.4–5.9)
RBC #/AREA URNS AUTO: 1 /HPF (ref 0–2)
SODIUM SERPL-SCNC: 143 MMOL/L (ref 133–144)
SOURCE: NORMAL
SP GR UR STRIP: 1.01 (ref 1–1.03)
SPECIMEN SOURCE: NORMAL
SPECIMEN SOURCE: NORMAL
TRANS CELLS #/AREA URNS HPF: <1 /HPF (ref 0–1)
UROBILINOGEN UR STRIP-MCNC: NORMAL MG/DL (ref 0–2)
WBC # BLD AUTO: 12.7 10E9/L (ref 4–11)
WBC #/AREA URNS AUTO: 0 /HPF (ref 0–5)

## 2020-06-03 PROCEDURE — 40000014 ZZH STATISTIC ARTERIAL MONITORING DAILY

## 2020-06-03 PROCEDURE — 94003 VENT MGMT INPAT SUBQ DAY: CPT

## 2020-06-03 PROCEDURE — 99207 ZZC APP CREDIT; MD BILLING SHARED VISIT: CPT | Performed by: NURSE PRACTITIONER

## 2020-06-03 PROCEDURE — 93005 ELECTROCARDIOGRAM TRACING: CPT

## 2020-06-03 PROCEDURE — 97110 THERAPEUTIC EXERCISES: CPT | Mod: GO

## 2020-06-03 PROCEDURE — 25000132 ZZH RX MED GY IP 250 OP 250 PS 637: Performed by: NURSE PRACTITIONER

## 2020-06-03 PROCEDURE — 70450 CT HEAD/BRAIN W/O DYE: CPT

## 2020-06-03 PROCEDURE — 00000146 ZZHCL STATISTIC GLUCOSE BY METER IP

## 2020-06-03 PROCEDURE — 25000132 ZZH RX MED GY IP 250 OP 250 PS 637: Performed by: STUDENT IN AN ORGANIZED HEALTH CARE EDUCATION/TRAINING PROGRAM

## 2020-06-03 PROCEDURE — 27210437 ZZH NUTRITION PRODUCT SEMIELEM INTERMED LITER

## 2020-06-03 PROCEDURE — 25000128 H RX IP 250 OP 636: Performed by: STUDENT IN AN ORGANIZED HEALTH CARE EDUCATION/TRAINING PROGRAM

## 2020-06-03 PROCEDURE — 25000128 H RX IP 250 OP 636

## 2020-06-03 PROCEDURE — 80053 COMPREHEN METABOLIC PANEL: CPT | Performed by: NURSE PRACTITIONER

## 2020-06-03 PROCEDURE — 87040 BLOOD CULTURE FOR BACTERIA: CPT

## 2020-06-03 PROCEDURE — 85379 FIBRIN DEGRADATION QUANT: CPT | Performed by: STUDENT IN AN ORGANIZED HEALTH CARE EDUCATION/TRAINING PROGRAM

## 2020-06-03 PROCEDURE — 84100 ASSAY OF PHOSPHORUS: CPT | Performed by: NURSE PRACTITIONER

## 2020-06-03 PROCEDURE — 93010 ELECTROCARDIOGRAM REPORT: CPT | Performed by: INTERNAL MEDICINE

## 2020-06-03 PROCEDURE — 81001 URINALYSIS AUTO W/SCOPE: CPT | Performed by: NURSE PRACTITIONER

## 2020-06-03 PROCEDURE — 99233 SBSQ HOSP IP/OBS HIGH 50: CPT | Performed by: INTERNAL MEDICINE

## 2020-06-03 PROCEDURE — 25000128 H RX IP 250 OP 636: Performed by: NURSE PRACTITIONER

## 2020-06-03 PROCEDURE — 25800030 ZZH RX IP 258 OP 636

## 2020-06-03 PROCEDURE — 25000132 ZZH RX MED GY IP 250 OP 250 PS 637

## 2020-06-03 PROCEDURE — 40000275 ZZH STATISTIC RCP TIME EA 10 MIN

## 2020-06-03 PROCEDURE — 83735 ASSAY OF MAGNESIUM: CPT | Performed by: NURSE PRACTITIONER

## 2020-06-03 PROCEDURE — 36415 COLL VENOUS BLD VENIPUNCTURE: CPT

## 2020-06-03 PROCEDURE — 20000004 ZZH R&B ICU UMMC

## 2020-06-03 PROCEDURE — 85027 COMPLETE CBC AUTOMATED: CPT | Performed by: NURSE PRACTITIONER

## 2020-06-03 PROCEDURE — 25000132 ZZH RX MED GY IP 250 OP 250 PS 637: Performed by: INTERNAL MEDICINE

## 2020-06-03 PROCEDURE — 93926 LOWER EXTREMITY STUDY: CPT | Mod: RT

## 2020-06-03 PROCEDURE — 40000281 ZZH STATISTIC TRANSPORT TIME EA 15 MIN

## 2020-06-03 PROCEDURE — 74176 CT ABD & PELVIS W/O CONTRAST: CPT

## 2020-06-03 PROCEDURE — 71045 X-RAY EXAM CHEST 1 VIEW: CPT

## 2020-06-03 RX ORDER — PIPERACILLIN SODIUM, TAZOBACTAM SODIUM 4; .5 G/20ML; G/20ML
4.5 INJECTION, POWDER, LYOPHILIZED, FOR SOLUTION INTRAVENOUS EVERY 6 HOURS
Status: DISCONTINUED | OUTPATIENT
Start: 2020-06-03 | End: 2020-06-10

## 2020-06-03 RX ORDER — FUROSEMIDE 10 MG/ML
60 INJECTION INTRAMUSCULAR; INTRAVENOUS ONCE
Status: COMPLETED | OUTPATIENT
Start: 2020-06-03 | End: 2020-06-03

## 2020-06-03 RX ORDER — FENTANYL CITRATE 50 UG/ML
50-100 INJECTION, SOLUTION INTRAMUSCULAR; INTRAVENOUS
Status: DISCONTINUED | OUTPATIENT
Start: 2020-06-03 | End: 2020-06-09

## 2020-06-03 RX ADMIN — CHLORHEXIDINE GLUCONATE 0.12% ORAL RINSE 15 ML: 1.2 LIQUID ORAL at 08:56

## 2020-06-03 RX ADMIN — TICAGRELOR 90 MG: 90 TABLET ORAL at 19:56

## 2020-06-03 RX ADMIN — Medication 25 MG: at 19:57

## 2020-06-03 RX ADMIN — ACETAMINOPHEN 650 MG: 325 TABLET ORAL at 11:06

## 2020-06-03 RX ADMIN — FENTANYL CITRATE 50 MCG: 50 INJECTION, SOLUTION INTRAMUSCULAR; INTRAVENOUS at 22:46

## 2020-06-03 RX ADMIN — QUETIAPINE FUMARATE 25 MG: 25 TABLET ORAL at 19:56

## 2020-06-03 RX ADMIN — HEPARIN SODIUM 5000 UNITS: 5000 INJECTION, SOLUTION INTRAVENOUS; SUBCUTANEOUS at 13:13

## 2020-06-03 RX ADMIN — HEPARIN SODIUM 5000 UNITS: 5000 INJECTION, SOLUTION INTRAVENOUS; SUBCUTANEOUS at 06:52

## 2020-06-03 RX ADMIN — POTASSIUM CHLORIDE 20 MEQ: 1.5 POWDER, FOR SOLUTION ORAL at 06:56

## 2020-06-03 RX ADMIN — FENTANYL CITRATE 50 MCG: 50 INJECTION, SOLUTION INTRAMUSCULAR; INTRAVENOUS at 17:35

## 2020-06-03 RX ADMIN — VANCOMYCIN HYDROCHLORIDE 2250 MG: 1 INJECTION, POWDER, LYOPHILIZED, FOR SOLUTION INTRAVENOUS at 15:17

## 2020-06-03 RX ADMIN — Medication 40 MG: at 08:52

## 2020-06-03 RX ADMIN — LEVETIRACETAM 1000 MG: 100 SOLUTION ORAL at 08:52

## 2020-06-03 RX ADMIN — CHLORHEXIDINE GLUCONATE 0.12% ORAL RINSE 15 ML: 1.2 LIQUID ORAL at 19:57

## 2020-06-03 RX ADMIN — Medication 40 MG: at 19:57

## 2020-06-03 RX ADMIN — CHLORHEXIDINE GLUCONATE 0.12% ORAL RINSE 15 ML: 1.2 LIQUID ORAL at 15:24

## 2020-06-03 RX ADMIN — ASPIRIN 81 MG CHEWABLE TABLET 81 MG: 81 TABLET CHEWABLE at 08:52

## 2020-06-03 RX ADMIN — PIPERACILLIN AND TAZOBACTAM 4.5 G: 4; .5 INJECTION, POWDER, FOR SOLUTION INTRAVENOUS at 14:32

## 2020-06-03 RX ADMIN — AMPICILLIN SODIUM AND SULBACTAM SODIUM 3 G: 2; 1 INJECTION, POWDER, FOR SOLUTION INTRAMUSCULAR; INTRAVENOUS at 09:24

## 2020-06-03 RX ADMIN — TICAGRELOR 90 MG: 90 TABLET ORAL at 08:52

## 2020-06-03 RX ADMIN — OXYCODONE HYDROCHLORIDE 5 MG: 5 TABLET ORAL at 08:52

## 2020-06-03 RX ADMIN — AMPICILLIN SODIUM AND SULBACTAM SODIUM 3 G: 2; 1 INJECTION, POWDER, FOR SOLUTION INTRAMUSCULAR; INTRAVENOUS at 03:38

## 2020-06-03 RX ADMIN — ACETAMINOPHEN 650 MG: 325 TABLET ORAL at 06:52

## 2020-06-03 RX ADMIN — Medication 25 MG: at 08:52

## 2020-06-03 RX ADMIN — CHLORHEXIDINE GLUCONATE 0.12% ORAL RINSE 15 ML: 1.2 LIQUID ORAL at 11:06

## 2020-06-03 RX ADMIN — HEPARIN SODIUM 5000 UNITS: 5000 INJECTION, SOLUTION INTRAVENOUS; SUBCUTANEOUS at 22:46

## 2020-06-03 RX ADMIN — ACETAMINOPHEN 650 MG: 325 TABLET ORAL at 17:58

## 2020-06-03 RX ADMIN — MULTIVITAMIN 15 ML: LIQUID ORAL at 08:52

## 2020-06-03 RX ADMIN — FENTANYL CITRATE 100 MCG: 50 INJECTION, SOLUTION INTRAMUSCULAR; INTRAVENOUS at 14:31

## 2020-06-03 RX ADMIN — ACETAMINOPHEN 650 MG: 325 TABLET ORAL at 02:33

## 2020-06-03 RX ADMIN — PIPERACILLIN AND TAZOBACTAM 4.5 G: 4; .5 INJECTION, POWDER, FOR SOLUTION INTRAVENOUS at 20:10

## 2020-06-03 RX ADMIN — LEVETIRACETAM 1000 MG: 100 SOLUTION ORAL at 19:57

## 2020-06-03 RX ADMIN — FUROSEMIDE 60 MG: 10 INJECTION, SOLUTION INTRAMUSCULAR; INTRAVENOUS at 11:06

## 2020-06-03 ASSESSMENT — MIFFLIN-ST. JEOR: SCORE: 1767.75

## 2020-06-03 ASSESSMENT — ACTIVITIES OF DAILY LIVING (ADL)
ADLS_ACUITY_SCORE: 18

## 2020-06-03 NOTE — PROGRESS NOTES
Cardiology Progress Note  Scot Bishop MRN: [id number]  Age: 39 year old, : 1980  Date: 2020            Assessment and Plan:     Scot Bishop is a 39 year old male who was admitted on 2020 for cardiac arrest. Pt reportedly had chest pain that started on 20. He was using Drano on his pipes at home and did not initially seek medical attention for CP and SOB since it said on the box that Drano can cause those symptoms. He took a shower and had syncopal episode after coming out of the shower. EMS was called; initial rhythm asystole. With chest compressions pt eventually had PEA and then eventually had VF in the field. After multiple cycles of epinephrine had ROSC. He was intubated in the field.   Pt was brought to [facility] ED where he regained a pulse and was brought to the Cath Lab for coronary angiogram. Initially, BP was 90s/60s as he was being transported from ED but he had recurrent cardiac arrest on arrival to Cath Lab. ECG showed ST elevation in aVR. He was promptly placed on VA ECMO. He had thrombotic occlusion of proximal LAD and underwent successful aspiration thrombectomy and PCI w/ NAZIA of proximal and mid LAD.     Interval events: care conference yesterday w/ family and multidisciplinary team. Persistently febrile overnight despite scheduled acetaminophen. Repeat cultures negative. Currently being treated w/ unasyn for acinetobacter in previous sputum cx.     Today's plan:   -ID consult for persistent fevers   -Pt's father to visit today   -Awaiting family decision re: trach/PEG     Neurology: Intubated. Initial CT head negative. Cooled to 34 degrees on arrival; rewarmed  AM. EEG  showed severe diffuse encephalopathy without seizures or epileptiform discharges.  Repeat CTH : multifocal acute/subacute cerebral infarcts   Brain MRI : diffuse acute/subacute infarcts in bilat cerebral hemispheres, corpus callosum, and periventricular white  matter c/w evolving diffuse hypoxic ischemic brain injury   Episode of LUE posturing and rhythmic tremors overnight on 5/27. EEG restarted 5/28.  -EEG discontinued per Neuro    -off sedation; fentanyl bumps and PO oxycodone as needed for signs of pain   -seroquel 25 mg PM  -Neurocrit following; appreciate their recs    Cardiovascular / Hemodynamics: Refractory VF arrest    S/p NAZIA to proximal-mid LAD  Sinus tachycardia   Peripheral VA ECMO inserted for cardiac arrest. LA 16 initially. Suspect ongoing tachycardia d/t evolving MI and post-arrest state. ECMO decannulation at bedside on 5/19; IABP discontinued 5/20. 23 second run on VT overnight on 5/27.   TTE 5/26/20: EF 35-40%, RV normal   EKG 5/29: sinus tachycardia, QTc 439.   -continue ASA 81mg daily and ticagrelor 90mg BID  -continue metoprolol tartrate 25 mg BID   -hold statin for now given likely hepatic injury during arrest  -hold ACE/ARB for now given likely reduced renal fxn after arrest   Pulmonary: Acute hypoxic respiratory failure  COVID negative  Admission CT chest showed bilateral consolidations c/w aspiration PNA. Had thick secretions that required bagging by RT on 5/20. Bronchoscopy 5/21. Increasing oxygen requirements and pt-vent dyssynchrony on 5/22. Pulmonary re-consulted, infectious work up, pt sedated. Significant pressure injury of tongue w/ splitting of tongue noted by [facility] nurse today. No active bleeding.    Vent settings this AM: PS 7/7 30%  ABG 5/31: 7.48/34/116/26   CXR 6/3: mild pulmonary edema    -ENT consult 5/27 for pressure injury of tongue   -has been tolerating long periods of pressure support   -mucomyst and albuterol nebs to PRN   -growing GNR in sputum 5/22; continue unasyn (5/22 - 6/5)      -CXR as needed   -ABGs PRN    GI and Nutrition: Shock liver  GIB, resolved   , AST 63. T bili 0.5.    -post-pyloric FT placement 5/26  -monitor LFTs daily   -continue TF   -bowel regimen discontinued given loose stools  -C diff culture  "6/1 negative    -GI Prophylaxis: Protonix BID for UGIB   Renal, Fluid and Electrolytes: Acute kidney injury, resolved    Cr improved to 0.88 this AM. 2.1L UOP and net positive 700 mL yesterday. Na 143 today.   -FWF 50 mL every other hour   -lasix 40 mg IV today to keep even   -monitor I/O  -maintain K>3.8 and Mg>2    Infectious Disease: Aspiration pneumonia  Leukocytosis  WBC 12.7, tmax 101.8F. Blood cultures negative thus far. Grew GNR, acinetobacter (not baumannii) in sputum cx. Blood and sputum cx from 6/1 unchanged.   -vancomycin/zosyn x5 days (5/17-5/22) for asp PNA   -discontinued meropenem changed to unasyn 5/29  -unasyn through 6/5 for GNR in sputum  -scheduled acetaminophen for fevers   -C diff culture 6/1 negative    -ID consult for persistent fevers     -monitor for signs of infection given lines and leukocytosis   Hematology and Oncology: Thrombocytosis  Plts uptrending - 643 today. Likely reactive given ongoing acinetobacter infxn and recent ECMO. Receiving ASA/ticagrelor for NAZIA. Transfused 1 unit PRBCs 5/21. Hgb 9.2 today. No signs of active bleeding.    -peripheral smear pending  -daily CBC   -transfuse for Hgb<7   -DVT PPX: subcutaneous heparin    Endocrinology: No known medical history. BG elevated.  -not requiring insulin gtt  -HgbA1c 5.2   Lines:       R radial arterial line May 17, 2020  ETT May 17, 2020  OG tube May 17, 2020  NJ tube May 26, 2020   Restraint: mitts    Current lines are required for patient management       Family update by me today: Yes     Code Status: Full code     The pt was discussed and evaluated with Dr. Alanis, attending physician, who agrees with the assessment and plan above.     Kala Chiang, DNP APRN CNP          Objective     /73   Temp 101.8  F (38.8  C) (Axillary)   Resp 19   Ht 1.676 m (5' 6\")   Wt 91.5 kg (201 lb 11.5 oz)   SpO2 99%   BMI 32.56 kg/m    Temp:  [101.4  F (38.6  C)-101.8  F (38.8  C)] 101.8  F (38.8  C)  Heart Rate:  " [118-138] 133  Resp:  [16-19] 19  BP: (123)/(73) 123/73  MAP:  [77 mmHg-98 mmHg] 92 mmHg  Arterial Line BP: (105-129)/(34-83) 122/75  FiO2 (%):  [30 %] 30 %  SpO2:  [96 %-100 %] 99 %  Wt Readings from Last 2 Encounters:   06/03/20 91.5 kg (201 lb 11.5 oz)     I/O last 3 completed shifts:  In: 3637 [I.V.:527; NG/GT:1790]  Out: 2815 [Urine:2165; Emesis/NG output:300; Stool:350]      GENERAL APPEARANCE: Intubated. NAD.  HEENT: No icterus, PERRL 3 mm, ETT in place, NJ tube in place  CARDIOVASCULAR: sinus tachycardia, normal S1 and S2, no S3 or S4 and no murmur, click or rub. Normal PMI. Pulses dopplerable.  RESP: Clear bilaterally. Mechanical ventilation.    GASTRO: Soft, bowel sounds hyperactive.  GENITOURINARY: Condom cath in place.  EXTREMITIES: Warm, no edema, pulses dopplered.  NEURO: Intubated, Pupils equal and reactive, 3 mm. Moving extremities and opening eyes although not following commands. Withdraws to pain.    INTEGUMENTARY: No rashes. Line sites CDI. Bilaterally groin sites CDI; right groin ecchymotic, incision intact.   LINES/TUBES/DRAINS: R radial Arterial line. ETT. OG. NJ.          Data:     Vent Settings:  Resp: 19 SpO2: 99 % O2 Device: Mechanical Ventilator      Arterial Blood Gas:   Recent Labs   Lab 05/31/20  0853 05/29/20  0431 05/28/20  0605 05/28/20  0414   PH 7.48* 7.50* 7.47* 7.49*   PCO2 34* 35 36 34*   PO2 116* 137* 141* 61*   HCO3 26 27 26 26   O2PER 30 35.0 40 30       Vitals:    05/31/20 0400 06/01/20 0600 06/03/20 0300   Weight: 93.8 kg (206 lb 12.7 oz) 93.6 kg (206 lb 5.6 oz) 91.5 kg (201 lb 11.5 oz)   I/O last 3 completed shifts:  In: 3637 [I.V.:527; NG/GT:1790]  Out: 2815 [Urine:2165; Emesis/NG output:300; Stool:350]  Recent Labs   Lab 06/03/20 0527 06/02/20  0345    142   POTASSIUM 3.6 3.7   CHLORIDE 111* 112*   CO2 25 25   ANIONGAP 6 5   * 115*   BUN 33* 30   CR 0.88 0.84   TEN 8.8 8.7     No components found for: URINE   Recent Labs   Lab 06/03/20 0527 06/02/20  0345  20  0323   AST 63* 66* 56*   * 114* 123*   BILITOTAL 0.5 0.5 0.6   ALBUMIN 3.0* 2.8* 2.8*   PROTTOTAL 8.3 7.6 7.6   ALKPHOS 131 124 126     Temp: 101.8  F (38.8  C) Temp src: AxillaryTemp  Min: 101.4  F (38.6  C)  Max: 101.8  F (38.8  C)   Recent Labs   Lab 20  0520  1059 20  0345 20  03420  0323   WBC 12.7* 14.1* 12.6* 11.4* 14.4*   HGB 9.2* 9.0* 8.5* 8.6* 8.9*   HCT 30.7* 29.3* 27.8* 28.2* 28.7*   MCV 98 99 99 99 99   RDW 15.4* 15.3* 15.4* 15.5* 15.4*   * 642* 614* 574* 545*     No lab results found in last 7 days.  Recent Labs   Lab 20  0520  0345 20  03420  1651   * 115* 117* 120* 118*       All imaging personally reviewed:  Recent Results (from the past 24 hour(s))   Echocardiogram Complete    Narrative    [id number]  [id number]  [id number]  [id number]^GRIS^YOSHI           [facility],[place]  Echocardiography Laboratory  [address] [place] [id number]     Name: FAINA FORRESTER  MRN: [id number]  : 1980  Study Date: 2020 08:16 AM  Age: 39 yrs  Gender: Male  Patient Location: [facility]  Reason For Study: Cardiac Arrest  Ordering Physician: YOSHI LANDRY  Performed By: Denisse Johnson RDCS     BSA: 2.1 m2  Height: 66 in  Weight: 231 lb  BP: 132/58 mmHg  _____________________________________________________________________________  __        Procedure  Complete Portable Echo Adult. Technically difficult study.Extremely poor  acoustic windows.  _____________________________________________________________________________  __        Interpretation Summary  VA ECMO at 3.7L/min. Technically difficult study. Extremely poor acoustic  windows.  Severely (EF <30%) reduced left ventricular function on extremely limited  views.  The right ventricle cannot be assessed.  No pericardial effusion is present.  Previous study not available for  comparison.  _____________________________________________________________________________  __        Left Ventricle  Left ventricular wall thickness cannot evaluate. Left ventricular size is  normal. Severely (EF <30%) reduced left ventricular function is present. Left  ventricular diastolic function is not assessable. Severe diffuse hypokinesis  is present.     Right Ventricle  The right ventricle cannot be assessed. Right ventricular function cannot be  assessed due to poor image quality.     Mitral Valve  The mitral valve cannot be assessed.        Aortic Valve  The aortic valve opens with each cardiac cycle. On Doppler interrogation,  there is no significant stenosis or regurgitation.     Tricuspid Valve  The tricuspid valve cannot be assessed. Pulmonary artery systolic pressure  cannot be assessed.     Pulmonic Valve  The pulmonic valve cannot be assessed.     Vessels  The aorta root cannot be assessed. The thoracic aorta cannot be assessed.  Venous ECMO cannula is visualized traversing the IVC.     Pericardium  No pericardial effusion is present.     Compared to Previous Study  Previous study not available for comparison.     _____________________________________________________________________________  __                       Report approved by: Ashlee Izquierdo 05/18/2020 09:18 AM                 _____________________________________________________________________________  __                Medications     Current Facility-Administered Medications   Medication     0.9% sodium chloride BOLUS     0.9% sodium chloride BOLUS     acetaminophen (TYLENOL) tablet 650 mg     acetylcysteine (MUCOMYST) 10 % nebulizer solution 4 mL     albuterol (PROVENTIL) neb solution 2.5 mg     ampicillin-sulbactam (UNASYN) 3 g vial to attach to  mL bag     artificial tears ophthalmic ointment     aspirin (ASA) chewable tablet 81 mg     calcium chloride in  mL intermittent infusion 1 g     chlorhexidine (PERIDEX)  0.12 % solution 15 mL     dextrose 10% infusion     glucose gel 15-30 g    Or     dextrose 50 % injection 25-50 mL    Or     glucagon injection 1 mg     fentaNYL (PF) (SUBLIMAZE) injection 50 mcg     heparin ANTICOAGULANT injection 5,000 Units     heparin lock flush 10 UNIT/ML injection 5-10 mL     heparin lock flush 10 UNIT/ML injection 5-10 mL     hydrALAZINE (APRESOLINE) injection 10 mg     ipratropium - albuterol 0.5 mg/2.5 mg/3 mL (DUONEB) neb solution 3 mL     levalbuterol (XOPENEX) neb solution 1.25 mg     levETIRAcetam (KEPPRA) solution 1,000 mg     lidocaine (LMX4) cream     lidocaine 1 % 0.1-1 mL     magnesium sulfate 2 g in water intermittent infusion     magnesium sulfate 4 g in 100 mL sterile water (premade)     metoprolol tartrate (LOPRESSOR) half-tab 25 mg     multivitamins w/minerals (CERTAVITE) liquid 15 mL     naloxone (NARCAN) injection 0.1-0.4 mg     oxyCODONE (ROXICODONE) tablet 5 mg     pantoprazole (PROTONIX) 2 mg/mL suspension 40 mg     potassium chloride (KLOR-CON) Packet 20-40 mEq     potassium chloride 10 mEq in 100 mL intermittent infusion with 10 mg lidocaine     potassium chloride 10 mEq in 100 mL sterile water intermittent infusion (premix)     potassium chloride 20 mEq in 50 mL intermittent infusion     potassium chloride ER (KLOR-CON M) CR tablet 20-40 mEq     QUEtiapine (SEROquel) tablet 25 mg     sodium chloride (NEBUSAL) 3 % neb solution 3 mL     sodium chloride (PF) 0.9% PF flush 10-20 mL     sodium chloride (PF) 0.9% PF flush 3 mL     sodium chloride (PF) 0.9% PF flush 3 mL     sodium phosphate 10 mmol in D5W intermittent infusion     sodium phosphate 15 mmol in D5W intermittent infusion     sodium phosphate 20 mmol in D5W intermittent infusion     sodium phosphate 25 mmol in D5W intermittent infusion     ticagrelor (BRILINTA) tablet 90 mg                      I have seen and examined the patient with the CSI team. I agree with the assessment and plan of the note above.I have  reviewed pertinent labs.     Jefferson Alanis MD  Interventional Cardiology  Pager: [phone]

## 2020-06-03 NOTE — PHARMACY-VANCOMYCIN DOSING SERVICE
Pharmacy Vancomycin Initial Note  Date of Service Samina 3, 2020  Patient's  1980  39 year old, male    Indication: Ventilator-Associated Pneumonia    Current estimated CrCl = Estimated Creatinine Clearance: 118.2 mL/min (based on SCr of 0.88 mg/dL).    Creatinine for last 3 days  2020:  3:49 AM Creatinine 0.79 mg/dL  2020:  3:45 AM Creatinine 0.84 mg/dL  6/3/2020:  5:27 AM Creatinine 0.88 mg/dL    Recent Vancomycin Level(s) for last 3 days  No results found for requested labs within last 72 hours.      Vancomycin IV Administrations (past 72 hours)      No vancomycin orders with administrations in past 72 hours.                Nephrotoxins and other renal medications (From now, onward)    Start     Dose/Rate Route Frequency Ordered Stop    20 1530  vancomycin (VANCOCIN) 1,750 mg in sodium chloride 0.9 % 250 mL intermittent infusion      1,750 mg (central catheter)  over 60 Minutes Intravenous EVERY 12 HOURS 20 1402      20 1500  piperacillin-tazobactam (ZOSYN) 4.5 g vial to attach to  mL bag      4.5 g  over 30 Minutes Intravenous EVERY 6 HOURS 20 1144      20 1415  vancomycin (VANCOCIN) 2,250 mg in sodium chloride 0.9 % 250 mL intermittent infusion      2,250 mg (central catheter)  over 60 Minutes Intravenous ONCE 20 1401            Contrast Orders - past 72 hours (72h ago, onward)    None                Plan:  1.  Start vancomycin 2250 mg iv [24.6 mg/kg] x 1 then 1750 mg iv q12h [19.1 mg/kg]  2.  Goal Trough Level: 15-20 mg/L   3.  Pharmacy will check trough levels as appropriate in 1-3 Days.    4. Serum creatinine levels will be ordered daily for the first week of therapy and at least twice weekly for subsequent weeks.    5. [place] method utilized to dose vancomycin therapy: Method 2    Eliza Thomason, PharmD

## 2020-06-03 NOTE — PLAN OF CARE
Discharge Planner OT   Patient plan for discharge: Not stated  Current status: Pt with eye opening but to command, no visual tracking or sustained attention to voice. Promoted functional ROM and joint integrity by providing PROM to BUE/BLE in all planes of motion.  Vital signs assessed throughout session and remain stable on SPN/CPAP with 30% FiO2, PEEP 7, RR 20 and O2 99%. -130. Pt with occasional head shaking and moving of RLE, appearing cyclical versus in response to activity.  Barriers to return to prior living situation: medical status, cognition, impaired ADLs  Recommendations for discharge: rehab  Rationale for recommendations: Pt is currently below functional baseline for ADLs, transfers, and functional mobility, would benefit from continued therapy to address above deficits and maximize strength/independence in order to return to PLOF.         Entered by: Melanie Silveira 06/03/2020 8:25 AM

## 2020-06-03 NOTE — PLAN OF CARE
Major Shift Events: Pt opens eyes spontaneously, not able to follow commands. Pt moves all extremities, restless. Febrile, tmax 101.8. Sinus tachycardia    Plan: Follow plan of care. Son to visit [id number] for care conference    For vital signs and complete assessments, please see documentation flowsheets.    Goal: Absence of Hospital-Acquired Illness or Injury  6/3/2020 0650 by Leyda Ramirez RN  Outcome: No Change  Goal: Optimal Comfort and Wellbeing  6/3/2020 0650 by Leyda Ramirez RN  Outcome: No Change  Goal: Readiness for Transition of Care  6/3/2020 0650 by Leyda Ramirez RN  Outcome: No Change                 Problem: Airway Clearance Ineffective  Goal: Effective Airway Clearance  6/3/2020 0650 by Leyda Ramirez RN  Outcome: No Change

## 2020-06-03 NOTE — PLAN OF CARE
ICU End of Shift Summary. See flowsheets for vital signs and detailed assessment.    Changes this shift: Head CT complete. Spontaneously moves RLE and bilateral UEs, but no spontaneous movement of LLE; withdraws all extremities but difficult to obtain response. Right inguinal ultrasound completed for concerns noted on CT. Remains on unchanged PST. T max 101.5, improved with ice packs; C/A/P CT complete per ID. 12 lead showing sinus tachycardia. Large amount of unmeasured urine output after 60mg Lasix.     Plan: Ongoing goals of care discussions with patient's family regarding trach/PEG. Notify CSI with concerns/changes.

## 2020-06-03 NOTE — CONSULTS
[facility] INFECTIOUS DISEASES CONSULTATION     Patient:  Scot Bishop   Date of birth 1980, Medical record number [id number]  Date of Visit:  06/03/2020  Date of Admission: 5/17/2020  Consult Requester:Invasive, Cardiologist, *          Assessment and Recommendations:   ASSESSMENT:  1. Fever  2. Leukocytosis  3. Acinetobacter (not baumanii) on sputum culture (5/20, 5/22, 5/27) has been on meropenem --> unasyn  4. Blood culture with GPC in clusters  5. Acute respiratory failure  6. STEMI with PEA/VF out of hospital arrest s/p NAZIA to LAD  7. VA-ECMO (5/17-5/19)      DISCUSSION:   Scot Bishop is a 39 year old male with no significant known past medical history who presented to [facility] on 5/17/20 after a PEA/V Fib arrest and STEMI. Was taken to the cath lab emergently and had NAZIA placed to LAD, found to have LAD thrombotic occlusion s/p successful aspiration thrombectomy, was cannulated for VA ECMO (5/17-5/19).     Wound care RN and ENT have been following for tongue ulceration and split. Acute hypoxic respiratory failure and Acinetobacter (not baumanni) on sputum cultures (5/20, 5/22, 5/27), has been on Meropenem --> Unasyn without clearing. Fever to 101.5F on 5/22 and has continued to have intermittent fevers since, temperature to 102F on 5/28, has been persistently febrile >100.6 since 6/1 despite scheduled tylenol. Increase in fever curve not correlated with transition to Unasyn. Last chest CT was 5/17 and lungs with effusions c/w and atelectasis/consolidation in setting of recent arrest. CXR on 6/3 with increased LLL opacity.    Blood cultures 6/1 with GPCs in clusters on afternoon of 6/3. Recommend starting vancomycin while awaiting identification and repeating blood cultures in the event that this does not represent contaminant.     Would also obtain further imaging of chest/abd/pelvis- to further evaluate lungs as CXR today with possible increase in LLL opacity and to evaluate for intra-abdominal  "or groin hematoma or abscess.    RECOMMENDATION:  1. Continue Zosyn  2. Add vancomycin while awaiting identification of GPC on blood culture from 6/1  3. Repeat blood cx   4. Please obtain CT Chest/Abd/Pelvis    Thank you for this consult. ID will continue to follow.     Patient was discussed with Dr. Liu.     Emy Arana PA-C  Infectious Disease  Pager # [phone]  _______________________________________    Consult Question: persistent fevers.  Admission Diagnosis: ST elevation MI (STEMI) (H) [I21.3]         History of Present Illness:     History obtained from chart review    Scot Bishop is a 39 year old male with no significant known past medical history who presented to [facility] on 5/17/20 after a PEA/V Fib arrest and STEMI. Was taken to the cath lab emergently and had NAZIA placed to LAD, found to have LAD thrombotic occlusion s/p successful aspiration thrombectomy, was cannulated for VA ECMO (5/17-5/19). Per H&P \"Pt reportedly had chest pain that started on 5/16/20. He was using Drano on his pipes at home and did not initially seek medical attention for CP and SOB since it said on the box that Drano can cause those symptoms. He took a shower and had syncopal episode after coming out of the shower. EMS was called; initial rhythm asystole. With chest compressions pt eventually had PEA and then eventually had VF in the field. After multiple cycles of epinephrine had ROSC. He was intubated in the field.\" Concern for diffuse anoxic brain injury, neurology has been following. MRI (5/26/20) with difuse acute/subacute infarcts c/w hypoxic ischemic brain injury and diffuse microhemorrhage likely ECMO related. Also with Acute hypoxic respiratory failure and Acinetobacter (naut baumanni) on sputum cultures. Fever to 101.5F on 5/22 and has continued to have intermittent fevers since, temperature to 102F on 5/28, has been persistently febrile >100.6 since 6/1 despite scheduled tylenol. Wound care RN and ENT have " been following for tongue ulceration and split.    Patient has no known medical history and unclear if he was feeling sick prior to chest pain onset on 5/16. 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 He lives with his significant other and has joint custody of two children.           Review of Systems:   Unable to obtain- patient intubated and not able to follow commands/answer questions         Past Medical History:   Reveiewed. No significant past medical history.          Past Surgical History:     Past Surgical History:   Procedure Laterality Date     CV CORONARY ANGIOGRAM N/A 5/17/2020    Procedure: Coronary Angiogram;  Surgeon: Chadd Newby MD;  Location: [facility] CARDIAC CATH LAB     CV EXTRACORPERAL MEMBRANE OXYGENATION N/A 5/17/2020    Procedure: Extracorporeal Membrance Oxygenation;  Surgeon: Chadd Newby MD;  Location: [facility] CARDIAC CATH LAB     CV INTRA-AORTIC BALLOON PUMP INSERTION N/A 5/17/2020    Procedure: Intra-Aortic Balloon Pump Insertion;  Surgeon: Chadd Newby MD;  Location: [facility] CARDIAC CATH LAB     CV PCI STENT DRUG ELUTING N/A 5/17/2020    Procedure: Percutaneous Coronary Intervention Stent Drug Eluting;  Surgeon: Chadd Newby MD;  Location: [facility] CARDIAC CATH LAB     REMOVE EXTRACORPORAL MEMBRANE OXYGENATOR ADULT (OUTSIDE OR) N/A 5/19/2020    Procedure: ECMO Decannulation at Bedside;  Surgeon: Mariano Workman MD;  Location:  OR            Family History:   Reviewed and non-contributory.            Social History:     Social History     Tobacco Use     Smoking status: Not on file   Substance Use Topics     Alcohol use: Not on file     History   Sexual Activity     Sexual activity: Not on file            Current Medications:       acetaminophen  650 mg Oral Q6H     aspirin  81 mg Per Feeding Tube Daily     chlorhexidine  15 mL Swish & Spit 4x Daily     heparin ANTICOAGULANT  5,000 Units Subcutaneous Q8H GABRIELLE      heparin lock flush  5-10 mL Intracatheter Q24H     levETIRAcetam  1,000 mg Oral or Feeding Tube BID     metoprolol tartrate  25 mg Oral BID     multivitamins w/minerals  15 mL Per Feeding Tube Daily     pantoprazole  40 mg Oral or Feeding Tube BID     piperacillin-tazobactam  4.5 g Intravenous Q6H     QUEtiapine  25 mg Oral or Feeding Tube QPM     sodium chloride (PF)  3 mL Intracatheter Q8H     ticagrelor  90 mg Oral or Feeding Tube BID            Allergies:   No Known Allergies         Physical Exam:   Vitals were reviewed  Patient Vitals for the past 24 hrs:   BP Temp Temp src Pulse Heart Rate Resp SpO2 Weight   06/03/20 1200 -- 100.2  F (37.9  C) Axillary -- 112 15 99 % --   06/03/20 0840 123/79 -- -- 129 130 -- 99 % --   06/03/20 0800 -- 101.5  F (38.6  C) Axillary -- 133 12 98 % --   06/03/20 0400 -- 101.8  F (38.8  C) Axillary -- 122 20 99 % --   06/03/20 0000 -- 101.5  F (38.6  C) Axillary -- 122 20 100 % --   06/02/20 2000 -- 101.8  F (38.8  C) Axillary -- 129 18 99 % --   06/02/20 1600 -- 101.6  F (38.7  C) Axillary -- 125 19 100 % --       Physical Examination:  GENERAL:  well-developed, well-nourished. Moving all four extremities spontaneously.   HEENT:  Head is normocephalic, atraumatic   EYES:  Eyes have anicteric sclerae without conjunctival injection..    ENT:  Oropharynx is moist. ETT tube in place, tongue ulceration not well visualized  NECK:  Supple.   LUNGS:  Clear to auscultation bilateral, +mechanical ventilation   CARDIOVASCULAR:  Regular rate and rhythm with no murmurs, gallops or rubs.  ABDOMEN:  Normal bowel sounds, soft, nontender.   : condom catheter. R groin site without erythema or induration.  SKIN:  No acute rashes.  Line(s) are in place without any surrounding erythema or exudate. No stigmata of endocarditis.   EXTREMITIES: no swelling or erythema of ankles, knees, hips, elbows, wrists, or shoulders  NEUROLOGIC: Active x4 extremities, moving head back and forth. Does not follow  commands.         Laboratory Data:     Inflammatory Markers    Recent Labs   Lab Test 05/20/20 0356 05/19/20  0415 05/18/20 0353 05/17/20  1615   SED 83* 33* 8 7   .0* 89.0* 16.0* <2.9       Hematology Studies    Recent Labs   Lab Test 06/03/20 0527 06/02/20  1059 06/02/20 0345 06/01/20 0349 05/31/20 0323 05/30/20 0343 05/17/20  1615   WBC 12.7* 14.1* 12.6* 11.4* 14.4* 16.0*   < > 23.9*   ANEU  --  10.0*  --   --   --   --   --  20.9*   AEOS  --  0.6  --   --   --   --   --  0.1   HGB 9.2* 9.0* 8.5* 8.6* 8.9* 8.6*   < > 12.0*   MCV 98 99 99 99 99 98   < > 89   * 642* 614* 574* 545* 517*   < > 236    < > = values in this interval not displayed.       Metabolic Studies     Recent Labs   Lab Test 06/03/20 0527 06/02/20 0345 06/01/20 0349 05/31/20 0323 05/30/20  1651    142 143 142 143   POTASSIUM 3.6 3.7 3.6 3.8 4.1   CHLORIDE 111* 112* 110* 111* 109   CO2 25 25 26 25 25   BUN 33* 30 33* 37* 42*   CR 0.88 0.84 0.79 0.87 1.06   GFRESTIMATED >90 >90 >90 >90 87       Hepatic Studies    Recent Labs   Lab Test 06/03/20 0527 06/02/20 0345 05/31/20 0323 05/30/20 0343 05/29/20 0444 05/27/20  0312   BILITOTAL 0.5 0.5 0.6 0.7 0.7 1.0   ALKPHOS 131 124 126 116 120 114   ALBUMIN 3.0* 2.8* 2.8* 2.9* 2.9* 2.8*   AST 63* 66* 56* 62* 74* 113*   * 114* 123* 138* 177* 242*       Microbiology:  Culture Micro   Date Value Ref Range Status   06/02/2020 (A)  Preliminary    Light growth  Non lactose fermenting gram negative rods     06/02/2020 Culture in progress  Preliminary   06/01/2020 (A)  Preliminary    Cultured on the 2nd day of incubation:  Gram positive cocci in clusters     06/01/2020   Preliminary    Critical Value/Significant Value, preliminary result only, called to and read back by  Clifford Sullivan RN at 1226 [id number]     06/01/2020 No growth after 2 days  Preliminary   05/28/2020 No growth  Final   05/28/2020 No growth  Final   05/27/2020 (A)  Final    Light growth  Acinetobacter  species, not baumannii     05/27/2020 (A)  Final    Light growth  Candida albicans / dubliniensis  Candida albicans and Candida dubliniensis are not routinely speciated  Susceptibility testing not routinely done     05/27/2020 No growth  Final   05/22/2020 No growth  Final   05/22/2020 (A)  Final    Light growth  Acinetobacter species, not baumannii  Identification obtained by MALDI-TOF mass spectrometry research use only database. Test   characteristics determined and verified by the [facility]   ([facility]) [place], [place].  Susceptibility testing done on previous specimen     05/21/2020 (A)  Final    Light growth  Acinetobacter species, not baumannii  Identification obtained by MALDI-TOF mass spectrometry research use only database. Test   characteristics determined and verified by the [facility]   ([facility]) [place], [place].     05/20/2020 No growth  Final   05/20/2020 (A)  Final    Moderate growth  Acinetobacter species, not baumannii  Identification obtained by MALDI-TOF mass spectrometry research use only database. Test   characteristics determined and verified by the [facility] Diagnostic Laboratory   ([facility]) [place], [place].     05/20/2020 (A)  Final    Moderate growth  Coagulase negative Staphylococcus  Susceptibility testing not routinely done     05/19/2020 Moderate growth  Normal josemanuel    Final   05/19/2020 Moderate growth  Staphylococcus aureus   (A)  Final   05/19/2020 No growth  Final   05/18/2020 No growth  Final   05/17/2020 Heavy growth  Normal josemanuel    Final   05/17/2020 No growth  Final   05/17/2020 No growth  Final       Urine Studies    Recent Labs   Lab Test 06/03/20  1207 06/01/20  2143 05/27/20  0932 05/22/20  0840 05/17/20  2045   LEUKEST Negative Trace* Small* Small* Negative   WBCU 0 3 9* 5 7*       Vancomycin Levels    Recent Labs   Lab Test 05/20/20  1748   VANCOMYCIN 6.9       Hepatitis B Testing No lab results  found.  Hepatitis C Testing   No results found for: HCVAB, HQTG, HCGENO, HCPCR, HQTRNA, HEPRNA  Respiratory Virus Testing    No results found for: RS, FLUAG          Imaging:     CXR (6/3/2020)  Impression:   1. Concern for developing left lower lobe pneumonia..    MR BRAIN W/O CONTRAST (5/26/20)  Impression:  1.  Diffuse acute/subacute infarcts in bilateral cerebral hemispheres,  corpus callosum and periventricular white matter, consistent with  evolving diffuse hypoxic ischemic brain injury.  2.  Diffuse microhemorrhage involving infra and supratentorial brain,  likely ECMO related.    CT CHEST/ABD/PELVIS (5/17/20)  IMPRESSION:   1. Dependent bilateral pleural effusions with associated  atelectasis/consolidation, possible aspiration in the setting of  recent cardiac arrest.  2. Borderline dilated fluid-filled loops of small bowel measuring up  to 3.0 cm, likely result of hypotension.   3. Support devices are in appropriate position.  4. 3 mm round hypodensity in the pancreatic tail favor to represent  cyst or IPMN. Consider nonemergent follow-up MR.  5. Diffuse hepatic steatosis.

## 2020-06-03 NOTE — PROGRESS NOTES
[facility]  Palliative Care Social Work Note:    Patient Info:  Scot Bishop is a 39 year old male admitted with anterior stemi requiring ecmo. He decanulated on 5/19/20. He has been off of sedation and is not following commands.     Brief summary of visit: Scot's dad (Gerald) was allowed to visit Scot today as we are nearing the time to make a decision on peg & trach. PCSW met Gerald in the lobby and walked him up to Scot's room. We allowed some time for Gerald to be alone with Scot, bedside RN & PA from [facility] helped answer questions at his bedside. Gerald then asked for time to ask questions outside of Scot's room. He was very tearful during his visit with Scot and with our conversation (PCSW & PA-CSI) in a consult room. The family is meeting later today to make decisions together on trach/peg. Gerald thinks he knows which way he's leaning, but it's difficult and he wants the family's decision to be united. He reports that he knows how to contact either PA-CSI or PCSW. He appreciated the time with Scot & the chance to ask more questions.       Date of Admission: 5/17/2020    Reason for consult: Decisional support  Patient and family support    Sources of information: Family member -Gerald (leno)  Staff -bedside RN, unit SW, PA-CSI, palliative care team  Other -chart review    Recommendations & Plan:  -PCSW will continue to be available as needed for ongoing support.      These recommendations have been discussed with Gerald, medical teams, unit SW.    Symptoms & Concerns Addressed Today:  Emotional -Allowed time for Gerald to be emotional over his son's condition.   Physical -Discussed Scot's current physical condition and next steps.    Strengths Identified:    -involved and supportive family    Relationships & Support:  Aspects of relationships and support assessed today:    Identified family members: children (ages 6 & 9), dad, step-mom, siblings, ex-SO (mom to his kids)    Professional  supports: medical teams    Family coping: Gerald was very tearful today. Although he reports that they're all doing the best they can.     Bereavement Risk concerns: moderate    Coping, Mental Health & Adjustment to Illness:   Other -family's adjustment to illness    Goals, Decision Making & Advance Care Planning:   Prognosis, Goals, and/or Advance Care Planning were assessed today: Yes  If yes, brief summary of discussion: Provided more information on next steps and allowed time for Gerald's questions. Gerald wants the family to work together on making decisions on Scot's next steps.   Preferred language: English  Patient's decision making preferences: unable to assess  I have concerns about the patient/family's health literacy today: No  Patient has a completed Health Care Directive: No.   Code status per chart review: full code    Key Palliative Symptom Data:  We are not helping to manage these symptoms currently in this patient.      Clinical Social Work Interventions:   Assessment of palliative specific issues    Attended/participated in care conference  Facilitation of processing of thoughts/feelings  Family communication facilitated  Grief counseling      ERMELINDA Betancur, [facility]  Palliative Care Clinical   Pager [phone]    [facility] Inpatient Team Consult Pager [phone] Mon-Fri 8-4:30  After hours Answering Service [phone]

## 2020-06-04 LAB
ALBUMIN SERPL-MCNC: 3 G/DL (ref 3.4–5)
ALP SERPL-CCNC: 127 U/L (ref 40–150)
ALT SERPL W P-5'-P-CCNC: 120 U/L (ref 0–70)
ANION GAP SERPL CALCULATED.3IONS-SCNC: 5 MMOL/L (ref 3–14)
AST SERPL W P-5'-P-CCNC: 57 U/L (ref 0–45)
BILIRUB SERPL-MCNC: 0.6 MG/DL (ref 0.2–1.3)
BUN SERPL-MCNC: 36 MG/DL (ref 7–30)
CALCIUM SERPL-MCNC: 9 MG/DL (ref 8.5–10.1)
CHLORIDE SERPL-SCNC: 111 MMOL/L (ref 94–109)
CO2 SERPL-SCNC: 29 MMOL/L (ref 20–32)
CREAT SERPL-MCNC: 0.93 MG/DL (ref 0.66–1.25)
ERYTHROCYTE [DISTWIDTH] IN BLOOD BY AUTOMATED COUNT: 15 % (ref 10–15)
GFR SERPL CREATININE-BSD FRML MDRD: >90 ML/MIN/{1.73_M2}
GLUCOSE BLDC GLUCOMTR-MCNC: 102 MG/DL (ref 70–99)
GLUCOSE BLDC GLUCOMTR-MCNC: 115 MG/DL (ref 70–99)
GLUCOSE SERPL-MCNC: 118 MG/DL (ref 70–99)
HCT VFR BLD AUTO: 29.8 % (ref 40–53)
HGB BLD-MCNC: 9.2 G/DL (ref 13.3–17.7)
INTERPRETATION ECG - MUSE: NORMAL
MCH RBC QN AUTO: 30.5 PG (ref 26.5–33)
MCHC RBC AUTO-ENTMCNC: 30.9 G/DL (ref 31.5–36.5)
MCV RBC AUTO: 99 FL (ref 78–100)
PLATELET # BLD AUTO: 577 10E9/L (ref 150–450)
POTASSIUM SERPL-SCNC: 3.7 MMOL/L (ref 3.4–5.3)
PROT SERPL-MCNC: 7.9 G/DL (ref 6.8–8.8)
RBC # BLD AUTO: 3.02 10E12/L (ref 4.4–5.9)
SODIUM SERPL-SCNC: 144 MMOL/L (ref 133–144)
WBC # BLD AUTO: 12.1 10E9/L (ref 4–11)

## 2020-06-04 PROCEDURE — 80053 COMPREHEN METABOLIC PANEL: CPT | Performed by: NURSE PRACTITIONER

## 2020-06-04 PROCEDURE — 25000128 H RX IP 250 OP 636: Performed by: STUDENT IN AN ORGANIZED HEALTH CARE EDUCATION/TRAINING PROGRAM

## 2020-06-04 PROCEDURE — 25000128 H RX IP 250 OP 636: Performed by: NURSE PRACTITIONER

## 2020-06-04 PROCEDURE — 99233 SBSQ HOSP IP/OBS HIGH 50: CPT | Performed by: INTERNAL MEDICINE

## 2020-06-04 PROCEDURE — 25000132 ZZH RX MED GY IP 250 OP 250 PS 637: Performed by: STUDENT IN AN ORGANIZED HEALTH CARE EDUCATION/TRAINING PROGRAM

## 2020-06-04 PROCEDURE — 25000132 ZZH RX MED GY IP 250 OP 250 PS 637: Performed by: INTERNAL MEDICINE

## 2020-06-04 PROCEDURE — 25800030 ZZH RX IP 258 OP 636

## 2020-06-04 PROCEDURE — 36415 COLL VENOUS BLD VENIPUNCTURE: CPT | Performed by: NURSE PRACTITIONER

## 2020-06-04 PROCEDURE — 40000275 ZZH STATISTIC RCP TIME EA 10 MIN

## 2020-06-04 PROCEDURE — 27210437 ZZH NUTRITION PRODUCT SEMIELEM INTERMED LITER: Performed by: DIETITIAN, REGISTERED

## 2020-06-04 PROCEDURE — 00000146 ZZHCL STATISTIC GLUCOSE BY METER IP

## 2020-06-04 PROCEDURE — 85027 COMPLETE CBC AUTOMATED: CPT | Performed by: NURSE PRACTITIONER

## 2020-06-04 PROCEDURE — 25000132 ZZH RX MED GY IP 250 OP 250 PS 637

## 2020-06-04 PROCEDURE — 20000004 ZZH R&B ICU UMMC

## 2020-06-04 PROCEDURE — 25000128 H RX IP 250 OP 636

## 2020-06-04 PROCEDURE — 94003 VENT MGMT INPAT SUBQ DAY: CPT

## 2020-06-04 PROCEDURE — 87040 BLOOD CULTURE FOR BACTERIA: CPT | Performed by: NURSE PRACTITIONER

## 2020-06-04 PROCEDURE — 25000132 ZZH RX MED GY IP 250 OP 250 PS 637: Performed by: NURSE PRACTITIONER

## 2020-06-04 RX ADMIN — POTASSIUM CHLORIDE 20 MEQ: 1.5 POWDER, FOR SOLUTION ORAL at 05:42

## 2020-06-04 RX ADMIN — Medication 40 MG: at 07:53

## 2020-06-04 RX ADMIN — HEPARIN SODIUM 5000 UNITS: 5000 INJECTION, SOLUTION INTRAVENOUS; SUBCUTANEOUS at 13:26

## 2020-06-04 RX ADMIN — TICAGRELOR 90 MG: 90 TABLET ORAL at 19:48

## 2020-06-04 RX ADMIN — OXYCODONE HYDROCHLORIDE 5 MG: 5 TABLET ORAL at 09:28

## 2020-06-04 RX ADMIN — ASPIRIN 81 MG CHEWABLE TABLET 81 MG: 81 TABLET CHEWABLE at 07:52

## 2020-06-04 RX ADMIN — FENTANYL CITRATE 50 MCG: 50 INJECTION, SOLUTION INTRAMUSCULAR; INTRAVENOUS at 23:07

## 2020-06-04 RX ADMIN — PIPERACILLIN AND TAZOBACTAM 4.5 G: 4; .5 INJECTION, POWDER, FOR SOLUTION INTRAVENOUS at 21:37

## 2020-06-04 RX ADMIN — ACETAMINOPHEN 650 MG: 325 TABLET ORAL at 05:42

## 2020-06-04 RX ADMIN — HEPARIN SODIUM 5000 UNITS: 5000 INJECTION, SOLUTION INTRAVENOUS; SUBCUTANEOUS at 05:42

## 2020-06-04 RX ADMIN — FENTANYL CITRATE 50 MCG: 50 INJECTION, SOLUTION INTRAMUSCULAR; INTRAVENOUS at 00:56

## 2020-06-04 RX ADMIN — CHLORHEXIDINE GLUCONATE 0.12% ORAL RINSE 15 ML: 1.2 LIQUID ORAL at 19:48

## 2020-06-04 RX ADMIN — ACETAMINOPHEN 650 MG: 325 TABLET ORAL at 23:08

## 2020-06-04 RX ADMIN — LEVETIRACETAM 1000 MG: 100 SOLUTION ORAL at 07:53

## 2020-06-04 RX ADMIN — HEPARIN SODIUM 5000 UNITS: 5000 INJECTION, SOLUTION INTRAVENOUS; SUBCUTANEOUS at 21:39

## 2020-06-04 RX ADMIN — CHLORHEXIDINE GLUCONATE 0.12% ORAL RINSE 15 ML: 1.2 LIQUID ORAL at 07:52

## 2020-06-04 RX ADMIN — QUETIAPINE FUMARATE 25 MG: 25 TABLET ORAL at 19:48

## 2020-06-04 RX ADMIN — VANCOMYCIN HYDROCHLORIDE 1750 MG: 10 INJECTION, POWDER, LYOPHILIZED, FOR SOLUTION INTRAVENOUS at 13:04

## 2020-06-04 RX ADMIN — FENTANYL CITRATE 50 MCG: 50 INJECTION, SOLUTION INTRAMUSCULAR; INTRAVENOUS at 10:25

## 2020-06-04 RX ADMIN — LEVETIRACETAM 1000 MG: 100 SOLUTION ORAL at 19:51

## 2020-06-04 RX ADMIN — Medication 25 MG: at 19:48

## 2020-06-04 RX ADMIN — Medication 40 MG: at 19:51

## 2020-06-04 RX ADMIN — PIPERACILLIN AND TAZOBACTAM 4.5 G: 4; .5 INJECTION, POWDER, FOR SOLUTION INTRAVENOUS at 04:08

## 2020-06-04 RX ADMIN — Medication 25 MG: at 07:52

## 2020-06-04 RX ADMIN — TICAGRELOR 90 MG: 90 TABLET ORAL at 07:52

## 2020-06-04 RX ADMIN — PIPERACILLIN AND TAZOBACTAM 4.5 G: 4; .5 INJECTION, POWDER, FOR SOLUTION INTRAVENOUS at 15:31

## 2020-06-04 RX ADMIN — CHLORHEXIDINE GLUCONATE 0.12% ORAL RINSE 15 ML: 1.2 LIQUID ORAL at 15:31

## 2020-06-04 RX ADMIN — CHLORHEXIDINE GLUCONATE 0.12% ORAL RINSE 15 ML: 1.2 LIQUID ORAL at 12:02

## 2020-06-04 RX ADMIN — ACETAMINOPHEN 650 MG: 325 TABLET ORAL at 12:02

## 2020-06-04 RX ADMIN — ACETAMINOPHEN 650 MG: 325 TABLET ORAL at 17:33

## 2020-06-04 RX ADMIN — OXYCODONE HYDROCHLORIDE 5 MG: 5 TABLET ORAL at 05:42

## 2020-06-04 RX ADMIN — OXYCODONE HYDROCHLORIDE 5 MG: 5 TABLET ORAL at 15:34

## 2020-06-04 RX ADMIN — MULTIVITAMIN 15 ML: LIQUID ORAL at 07:52

## 2020-06-04 RX ADMIN — PIPERACILLIN AND TAZOBACTAM 4.5 G: 4; .5 INJECTION, POWDER, FOR SOLUTION INTRAVENOUS at 08:27

## 2020-06-04 RX ADMIN — ACETAMINOPHEN 650 MG: 325 TABLET ORAL at 00:28

## 2020-06-04 ASSESSMENT — ACTIVITIES OF DAILY LIVING (ADL)
ADLS_ACUITY_SCORE: 18
ADLS_ACUITY_SCORE: 20
ADLS_ACUITY_SCORE: 20
ADLS_ACUITY_SCORE: 18
ADLS_ACUITY_SCORE: 20
ADLS_ACUITY_SCORE: 18

## 2020-06-04 NOTE — PLAN OF CARE
ICU End of Shift Summary. See flowsheets for vital signs and detailed assessment.    Changes this shift: Constantly restless, thrashing head and extremities in bed; not following commands nor any purposeful movement; withdraws all extremities. Minimal relief with PRN Oxycodone and Fentanyl, MDs aware. PST 7/7 all day. T max 101.5, improved throughout the day.     Plan: Trach/PEG Monday 6/8. Notify [facility] with concerns/changes.

## 2020-06-04 NOTE — PROGRESS NOTES
Cardiology Progress Note  Scot Bishop MRN: [id number]  Age: 39 year old, : 1980  Date: 2020            Assessment and Plan:     Scot Bishop is a 39 year old male who was admitted on 2020 for cardiac arrest. Pt reportedly had chest pain that started on 20. He was using Drano on his pipes at home and did not initially seek medical attention for CP and SOB since it said on the box that Drano can cause those symptoms. He took a shower and had syncopal episode after coming out of the shower. EMS was called; initial rhythm asystole. With chest compressions pt eventually had PEA and then eventually had VF in the field. After multiple cycles of epinephrine had ROSC. He was intubated in the field.   Pt was brought to [facility] ED where he regained a pulse and was brought to the Cath Lab for coronary angiogram. Initially, BP was 90s/60s as he was being transported from ED but he had recurrent cardiac arrest on arrival to Cath Lab. ECG showed ST elevation in aVR. He was promptly placed on VA ECMO. He had thrombotic occlusion of proximal LAD and underwent successful aspiration thrombectomy and PCI w/ NAZIA of proximal and mid LAD.     Interval events: Blood culture grew GPC and sputum culture grew GNR. Added vanc/zosyn per ID. CT CAP negative for infection but showed right inguinal 5 cm fluid collection. RLE US showed hematoma, negative for PSA. Also had CT head for lack of spontaneous movement in the LLE - CTH negative.   Pt's father, Gerald, visited patient yesterday. He was understandably tearful and grieving. Met w/ Gerald and Melyssa (Palliative Care SW) to answer Gerald's questions and provide support particularly regarding pending decision for trach/PEG. Per Gerald, family was planning to meet yesterday evening to discuss plan. Spoke w/ Gerald and his wife Vandana this morning; family has decided to move forward w/ trach/PEG.      Today's plan:   -repeat blood cx per ID   -continue  vanc/zosyn   -continue pressure support    -discontinue art line  -trach/PEG at bedside per SICU on Monday      Neurology: Intubated. Initial CT head negative. Cooled to 34 degrees on arrival; rewarmed 5/19 AM. EEG 5/25 showed severe diffuse encephalopathy without seizures or epileptiform discharges.  Repeat CTH 5/25: multifocal acute/subacute cerebral infarcts   Brain MRI 5/26: diffuse acute/subacute infarcts in bilat cerebral hemispheres, corpus callosum, and periventricular white matter c/w evolving diffuse hypoxic ischemic brain injury   Episode of LUE posturing and rhythmic tremors overnight on 5/27. EEG restarted 5/28.  -EEG discontinued per Neuro    -off sedation; fentanyl bumps and PO oxycodone as needed for signs of pain   -seroquel 25 mg PM  -Neurocrit following; appreciate their recs    Cardiovascular / Hemodynamics: Refractory VF arrest    S/p NAZIA to proximal-mid LAD  Sinus tachycardia   Peripheral VA ECMO inserted for cardiac arrest. LA 16 initially. Suspect ongoing tachycardia d/t evolving MI and post-arrest state. ECMO decannulation at bedside on 5/19; IABP discontinued 5/20. 23 second run on VT overnight on 5/27.   TTE 5/26/20: EF 35-40%, RV normal   EKG 5/29: sinus tachycardia, QTc 467.   -continue ASA 81mg daily and ticagrelor 90mg BID  -continue metoprolol tartrate 25 mg BID   -hold statin for now given likely hepatic injury during arrest  -hold ACE/ARB for now given likely reduced renal fxn after arrest   Pulmonary: Acute hypoxic respiratory failure  COVID negative  Admission CT chest showed bilateral consolidations c/w aspiration PNA. Had thick secretions that required bagging by RT on 5/20. Bronchoscopy 5/21. Increasing oxygen requirements and pt-vent dyssynchrony on 5/22. Pulmonary re-consulted, infectious work up, pt sedated. Significant pressure injury of tongue w/ splitting of tongue noted by [facility] nurse today. No active bleeding.    Vent settings this AM: PS 7/7 30%  ABG 5/31: 7.48/34/116/26    CXR 6/3: mild pulmonary edema    -ENT consult 5/27 for pressure injury of tongue   -has been tolerating long periods of pressure support   -mucomyst and albuterol nebs PRN   -growing GNR in sputum; unasyn d/c'd, added vanc/zosyn 6/4 per ID  -CXR as needed   -ABGs PRN    GI and Nutrition: Shock liver  GIB, resolved   , AST 57. T bili 0.6.    -post-pyloric FT placement 5/26  -monitor LFTs daily   -continue TF   -bowel regimen discontinued given loose stools  -C diff culture 6/1 negative    -GI Prophylaxis: Protonix BID for UGIB   Renal, Fluid and Electrolytes: Acute kidney injury, resolved    Cr improved to 0.93 this AM. 2L UOP and net positive 675 mL yesterday. Some urine unmeasured d/t condom cath. Na 144 today.   -FWF 50 mL every other hour    -monitor I/O  -maintain K>3.8 and Mg>2    Infectious Disease: Aspiration pneumonia  Leukocytosis  WBC 12.1, tmax 99.4F. Grew GNR, acinetobacter (not baumannii) in sputum cx. Blood cx 6/1 grew GPCs, sputum cx 6/2 grew non-lactose fermenting GNR. ID consulted 6/3 for persistent fevers.    -vancomycin/zosyn x5 days (5/17-5/22) for asp PNA   -discontinued meropenem changed to unasyn 5/29  -unasyn discontinued and added vanc/zosyn 6/4  -CT CAP negative for other source of infection   -scheduled acetaminophen for fevers  -C diff culture 6/1 negative      -monitor for signs of infection given lines and leukocytosis   Hematology and Oncology: Thrombocytosis, improving   Plts 577 today. Likely reactive given ongoing acinetobacter infxn and recent ECMO. Receiving ASA/ticagrelor for NAZIA. Transfused 1 unit PRBCs 5/21. Hgb 9.2 today. No signs of active bleeding.    -right inguinal hematoma on US at old ECMO cannulae insertion site, stable   -peripheral smear showed normal platelet morphology   -daily CBC   -transfuse for Hgb<7   -DVT PPX: subcutaneous heparin   Endocrinology: No known medical history. BG elevated.  -not requiring insulin gtt  -HgbA1c 5.2   Lines:       R radial  "arterial line May 17, 2020  ETT May 17, 2020  OG tube May 17, 2020  NJ tube May 26, 2020   Restraint: mitts    Current lines are required for patient management       Family update by me today: Yes     Code Status: Full code     The pt was discussed and evaluated with Dr. Alanis, attending physician, who agrees with the assessment and plan above.     Kala Chiang, DNP APRN CNP          Objective     /61 (BP Location: Left arm)   Pulse 129   Temp 99.7  F (37.6  C) (Axillary)   Resp 16   Ht 1.676 m (5' 6\")   Wt 91.5 kg (201 lb 11.5 oz)   SpO2 99%   BMI 32.56 kg/m    Temp:  [98.3  F (36.8  C)-101.5  F (38.6  C)] 99.7  F (37.6  C)  Pulse:  [129] 129  Heart Rate:  [] 106  Resp:  [12-18] 16  BP: (100-123)/(61-79) 100/61  MAP:  [71 mmHg-105 mmHg] 72 mmHg  Arterial Line BP: (100-135)/(52-87) 107/55  FiO2 (%):  [30 %] 30 %  SpO2:  [98 %-100 %] 99 %  Wt Readings from Last 2 Encounters:   06/03/20 91.5 kg (201 lb 11.5 oz)     I/O last 3 completed shifts:  In: 3867 [I.V.:882; NG/GT:1665]  Out: 3192 [Urine:1992; Emesis/NG output:300; Stool:900]      GENERAL APPEARANCE: Intubated. NAD.  HEENT: No icterus, PERRL 3 mm, ETT in place, NJ tube in place  CARDIOVASCULAR: sinus tachycardia, normal S1 and S2, no S3 or S4 and no murmur, click or rub. Normal PMI. Pulses dopplerable.  RESP: Clear bilaterally. Mechanical ventilation.    GASTRO: Soft, bowel sounds hyperactive.  GENITOURINARY: Condom cath in place.  EXTREMITIES: Warm, no edema. RLE warm, DP pulses +2.   NEURO: Intubated, Pupils equal and reactive, 3 mm. Moving extremities and opening eyes although not following commands. Withdraws to pain.    INTEGUMENTARY: No rashes. Line sites CDI. Bilaterally groin sites CDI; right groin ecchymotic, incision intact with area of firmness on medial aspect of incision.  LINES/TUBES/DRAINS: R radial Arterial line. ETT. OG. NJ.          Data:     Vent Settings:  Resp: 16 SpO2: 99 % O2 Device: Mechanical Ventilator  "     Arterial Blood Gas:   Recent Labs   Lab 20  0853 20  0431   PH 7.48* 7.50*   PCO2 34* 35   PO2 116* 137*   HCO3 26 27   O2PER 30 35.0       Vitals:    20 0400 20 0600 20 0300   Weight: 93.8 kg (206 lb 12.7 oz) 93.6 kg (206 lb 5.6 oz) 91.5 kg (201 lb 11.5 oz)   I/O last 3 completed shifts:  In: 3867 [I.V.:882; NG/GT:1665]  Out: 3192 [Urine:; Emesis/NG output:300; Stool:900]  Recent Labs   Lab 20  05    143   POTASSIUM 3.7 3.6   CHLORIDE 111* 111*   CO2 29 25   ANIONGAP 5 6   * 120*   BUN 36* 33*   CR 0.93 0.88   TEN 9.0 8.8     No components found for: URINE   Recent Labs   Lab 20  0527 20  0345   AST 57* 63* 66*   * 123* 114*   BILITOTAL 0.6 0.5 0.5   ALBUMIN 3.0* 3.0* 2.8*   PROTTOTAL 7.9 8.3 7.6   ALKPHOS 127 131 124     Temp: 99.7  F (37.6  C) Temp src: AxillaryTemp  Min: 98.3  F (36.8  C)  Max: 101.5  F (38.6  C)   Recent Labs   Lab 20  0416 20  0527 20  1059 20  0345 20  0349   WBC 12.1* 12.7* 14.1* 12.6* 11.4*   HGB 9.2* 9.2* 9.0* 8.5* 8.6*   HCT 29.8* 30.7* 29.3* 27.8* 28.2*   MCV 99 98 99 99 99   RDW 15.0 15.4* 15.3* 15.4* 15.5*   * 643* 642* 614* 574*     No lab results found in last 7 days.  Recent Labs   Lab 20  0416 20  0527 20  0345 20  0349 20  0323   * 120* 115* 117* 120*       All imaging personally reviewed:  Recent Results (from the past 24 hour(s))   Echocardiogram Complete    Narrative    [id number]  [id number]  [id number]  [id number]^GRIS^YOSHI           [facility],[place]  Echocardiography Laboratory  [address] [place] [id number]     Name: FAINA FORRESTER  MRN: [id number]  : 1980  Study Date: 2020 08:16 AM  Age: 39 yrs  Gender: Male  Patient Location: [facility]  Reason For Study: Cardiac Arrest  Ordering Physician: YOSHI LANDRY  Performed By: Denisse Johnson RDCS     BSA: 2.1  m2  Height: 66 in  Weight: 231 lb  BP: 132/58 mmHg  _____________________________________________________________________________  __        Procedure  Complete Portable Echo Adult. Technically difficult study.Extremely poor  acoustic windows.  _____________________________________________________________________________  __        Interpretation Summary  VA ECMO at 3.7L/min. Technically difficult study. Extremely poor acoustic  windows.  Severely (EF <30%) reduced left ventricular function on extremely limited  views.  The right ventricle cannot be assessed.  No pericardial effusion is present.  Previous study not available for comparison.  _____________________________________________________________________________  __        Left Ventricle  Left ventricular wall thickness cannot evaluate. Left ventricular size is  normal. Severely (EF <30%) reduced left ventricular function is present. Left  ventricular diastolic function is not assessable. Severe diffuse hypokinesis  is present.     Right Ventricle  The right ventricle cannot be assessed. Right ventricular function cannot be  assessed due to poor image quality.     Mitral Valve  The mitral valve cannot be assessed.        Aortic Valve  The aortic valve opens with each cardiac cycle. On Doppler interrogation,  there is no significant stenosis or regurgitation.     Tricuspid Valve  The tricuspid valve cannot be assessed. Pulmonary artery systolic pressure  cannot be assessed.     Pulmonic Valve  The pulmonic valve cannot be assessed.     Vessels  The aorta root cannot be assessed. The thoracic aorta cannot be assessed.  Venous ECMO cannula is visualized traversing the IVC.     Pericardium  No pericardial effusion is present.     Compared to Previous Study  Previous study not available for comparison.     _____________________________________________________________________________  __                       Report approved by: Ashlee Izquierdo 05/18/2020  09:18 AM                 _____________________________________________________________________________  __                Medications     Current Facility-Administered Medications   Medication     0.9% sodium chloride BOLUS     0.9% sodium chloride BOLUS     acetaminophen (TYLENOL) tablet 650 mg     acetylcysteine (MUCOMYST) 10 % nebulizer solution 4 mL     albuterol (PROVENTIL) neb solution 2.5 mg     artificial tears ophthalmic ointment     aspirin (ASA) chewable tablet 81 mg     calcium chloride in  mL intermittent infusion 1 g     chlorhexidine (PERIDEX) 0.12 % solution 15 mL     dextrose 10% infusion     glucose gel 15-30 g    Or     dextrose 50 % injection 25-50 mL    Or     glucagon injection 1 mg     fentaNYL (PF) (SUBLIMAZE) injection  mcg     heparin ANTICOAGULANT injection 5,000 Units     heparin lock flush 10 UNIT/ML injection 5-10 mL     heparin lock flush 10 UNIT/ML injection 5-10 mL     hydrALAZINE (APRESOLINE) injection 10 mg     ipratropium - albuterol 0.5 mg/2.5 mg/3 mL (DUONEB) neb solution 3 mL     levalbuterol (XOPENEX) neb solution 1.25 mg     levETIRAcetam (KEPPRA) solution 1,000 mg     lidocaine (LMX4) cream     lidocaine 1 % 0.1-1 mL     magnesium sulfate 2 g in water intermittent infusion     magnesium sulfate 4 g in 100 mL sterile water (premade)     metoprolol tartrate (LOPRESSOR) half-tab 25 mg     midazolam (VERSED) injection 1-2 mg     multivitamins w/minerals (CERTAVITE) liquid 15 mL     naloxone (NARCAN) injection 0.1-0.4 mg     oxyCODONE (ROXICODONE) tablet 5 mg     pantoprazole (PROTONIX) 2 mg/mL suspension 40 mg     piperacillin-tazobactam (ZOSYN) 4.5 g vial to attach to  mL bag     potassium chloride (KLOR-CON) Packet 20-40 mEq     potassium chloride 10 mEq in 100 mL intermittent infusion with 10 mg lidocaine     potassium chloride 10 mEq in 100 mL sterile water intermittent infusion (premix)     potassium chloride 20 mEq in 50 mL intermittent infusion      potassium chloride ER (KLOR-CON M) CR tablet 20-40 mEq     QUEtiapine (SEROquel) tablet 25 mg     sodium chloride (NEBUSAL) 3 % neb solution 3 mL     sodium chloride (PF) 0.9% PF flush 10-20 mL     sodium chloride (PF) 0.9% PF flush 3 mL     sodium chloride (PF) 0.9% PF flush 3 mL     sodium phosphate 10 mmol in D5W intermittent infusion     sodium phosphate 15 mmol in D5W intermittent infusion     sodium phosphate 20 mmol in D5W intermittent infusion     sodium phosphate 25 mmol in D5W intermittent infusion     ticagrelor (BRILINTA) tablet 90 mg     vancomycin (VANCOCIN) 1,750 mg in sodium chloride 0.9 % 250 mL intermittent infusion                      I have seen and examined the patient with the CSI team. I agree with the assessment and plan of the note above.I have reviewed pertinent labs.     Jefferson Alanis MD  Interventional Cardiology  Pager: [phone]

## 2020-06-04 NOTE — PROGRESS NOTES
[facility] ID Service: Follow Up Note      Patient:  Scot Bishop   Date of birth 1980, Medical record number [id number]  Date of Visit:  06/04/2020  Date of Admission: 5/17/2020         Assessment and Recommendations:   ID Problem List:  1. Fever  2. Leukocytosis  3. Acinetobacter (not baumannii) on sputum culture   4. Blood culture with MRSE  5. Right groin fluid collection  6. Acute respiratory failure  7. STEMI with PEA/VF out of hospital arrest s/p NAZIA to LAD  8. Hypoxic brain injury  9. VA-ECMO (5/17-5/19)    Recommendations:  1. Continue vancomycin, appreciate pharmacy dosing  2. Continue Zosyn  3. Awaiting results of repeat cultures      Discussion:  Scot Bishop is a 39 year old male with no significant known past medical history who presented to [facility] on 5/17/20 after a PEA/V Fib arrest and STEMI. Was taken to the cath lab emergently and had NAZIA placed to LAD, found to have LAD thrombotic occlusion s/p successful aspiration thrombectomy, was cannulated for VA ECMO (5/17-5/19).      Wound care RN and ENT have been following for tongue ulceration and split, though does not appear infected. Acute hypoxic respiratory failure and Acinetobacter (not baumanni) on sputum cultures (5/20, 5/22, 5/27), has been on Meropenem --> Unasyn without clearing. Fever to 101.5F on 5/22 and has continued to have intermittent fevers since, temperature to 102F on 5/28, has been persistently febrile >100.6 since 6/1 despite scheduled tylenol. Increase in fever curve not correlated with transition to Unasyn. Last chest CT was 5/17 and lungs with effusions c/w and atelectasis/consolidation in setting of recent arrest. CXR on 6/3 with increased LLL opacity, CT with some GGO though no consolidation. Sputum (6/2) growing Acinetobacter (not baumannii) and Candida albicans.     1/2 blood cultures 6/1 with MRSE. Started on vancomycin and awaiting results of follow up cultures. Unclear if this represents contamination,  though somewhat more likely with 1/3 positive so far.     CT Chest/abd/pelvis with fluid collection in right groin with reactive lymphadenopathy. Subsequent US showed fluid collections and no evidence of pseudoaneurysm. Favor hematoma vs abscess though at this point hematoma is most likely. No additional findings suspicious for abscess or pneumonia.    Would continue current antibiotics and monitor for clinical improvement. Did show some improvement in fever curve on 6/3, though not necessarily correlated with antibiotic changes (Unasyn --> Zosyn and addition of Vancomycin).       Don't hesitate to call with questions.     Attestation:  I have reviewed today's vital signs, medications, labs and imaging.  Emy Arana PA-C, Pager # [phone]            Interval History:       No significant changes overnight. Fever curve improved somewhat, then spiked another fever this AM.           Review of Systems:   Unable to obtain, intubated.          Current Antimicrobials   Current:  - Zosyn (start 6/3)  - Vancomycin (start 6/3)    Prior:  - Unasyn (5/29-6/3)  - Meropenem (5/22-5/29)  - Zosyn (5/17-5/22)  - Vancomycin (5/17-5/23)         Physical Exam:   Ranges for vital signs:  Temp:  [98.3  F (36.8  C)-101.2  F (38.4  C)] 98.9  F (37.2  C)  Heart Rate:  [] 92  Resp:  [12-17] 14  BP: (100)/(61) 100/61  MAP:  [71 mmHg-92 mmHg] 75 mmHg  Arterial Line BP: ()/(52-78) 96/61  FiO2 (%):  [30 %] 30 %  SpO2:  [98 %-100 %] 99 %    Intake/Output Summary (Last 24 hours) at 6/4/2020 1418  Last data filed at 6/4/2020 1400  Gross per 24 hour   Intake 3962 ml   Output 1987 ml   Net 1975 ml     Exam:  GENERAL:  well-developed, well-nourished. Intubated.  HEENT:  Head is normocephalic, atraumatic   EYES:  Eyes have anicteric sclerae without conjunctival injection.    ENT:  Oropharynx is moist. ETT tube in place, tongue ulceration not well visualized  LUNGS:  Clear to auscultation bilateral, +mechanical ventilation    CARDIOVASCULAR:  Regular rate and rhythm with no murmurs, gallops or rubs.  ABDOMEN:  Normal bowel sounds, soft, nontender.   : condom catheter. R groin site without erythema or induration.  SKIN:  No acute rashes.  Line(s) are in place without any surrounding erythema or exudate.  EXTREMITIES: no swelling or erythema of ankles, knees, hips, elbows, wrists, or shoulders  NEUROLOGIC: Moving head side to side. Does not follow commands.         Laboratory Data:   Reviewed.  Pertinent for:    Culture data:  6/3/20 blood culture: NGTD  6/2/20 sputum culture: Acinetobacter species, not baumannii; light growth of Candida albicans  6/1/20 blood culture left arm: Staph epi positive for mec A gene  6/1/20 blood culture right hand: NGTD  6/1/20 C.diff toxin by PCR: negative  5/27/20 sputum culture: Acinetobacter species, not baumannii (intermediate: ceftazidime); light growth of Candida albicans  5/22/20 sputum culture: Acinetobacter species, not baumannii  5/21/20 sputum culture: Acinetobacter species, not baumannii  5/20/20 sputum culture: Acinetobacter species, not baumannii (intermediate ceftazidime, ceftriaxone); CoNS  5/19/20 sputum culture: MSSA    Negative blood cultures (5/17, 5/18, 5/19, 5/20, 5/22, 5/27, 5/28)    Inflammatory Markers    Recent Labs   Lab Test 05/20/20  0356 05/19/20  0415 05/18/20  0353 05/17/20  1615   SED 83* 33* 8 7   .0* 89.0* 16.0* <2.9       Hematology Studies    Recent Labs   Lab Test 06/04/20  0416 06/03/20  0527 06/02/20  1059 06/02/20  0345   WBC 12.1* 12.7* 14.1* 12.6*   HGB 9.2* 9.2* 9.0* 8.5*   MCV 99 98 99 99   * 643* 642* 614*     Recent Labs   Lab Test 06/02/20  1059 05/17/20  1615   ANEU 10.0* 20.9*   AEOS 0.6 0.1       Metabolic Studies     Recent Labs   Lab Test 06/04/20  0416 06/03/20  0527 06/02/20  0345 06/01/20  0349    143 142 143   POTASSIUM 3.7 3.6 3.7 3.6   CHLORIDE 111* 111* 112* 110*   CO2 29 25 25 26   BUN 36* 33* 30 33*   CR 0.93 0.88 0.84 0.79    GFRESTIMATED >90 >90 >90 >90       Hepatic Studies    Recent Labs   Lab Test 06/04/20  0416 06/03/20  0527 06/02/20  0345   BILITOTAL 0.6 0.5 0.5   ALKPHOS 127 131 124   ALBUMIN 3.0* 3.0* 2.8*   AST 57* 63* 66*   * 123* 114*            Imaging:   CT CHEST/ABD/PELVIS W/O CONTRAST (6/3/20)  IMPRESSION: In this patient with history of persistent fever, the  current scan shows:   1. Hypodense collection measuring up to 5 cm in the right inguinal  region abutting the femoral vascular bundle with additional collection  anterior to this collection measuring up to 4.8 cm with fluid/fluid  level and dependent hyperdense contents; collections may represent  hematoma/abscess and/or pseudoaneurysm. Consider right inguinal  Doppler ultrasound for further evaluation.  2. No intra-abdominal or intrathoracic abscess or collection.  3. Patchy groundglass opacity in the right upper lobe may represent  atelectasis versus inflammatory change.  4. Support devices as described above.    US LOWER EXTREMITY ARTERIAL RIGHT (6/3/20)  Impression:   1. No evidence of pseudoaneurysm or arteriovenous fistula.  2. Slightly increased size of right groin fluid collections which may  represent hematomas.    CT HEAD W/O CONTRAST (6/3/20)  Impression:   1. No change in imaging features of diffuse hypoxic ischemic injury.  2. No evident new intracranial pathology.

## 2020-06-04 NOTE — PLAN OF CARE
ICU End of Shift Summary. See flowsheets for vital signs and detailed assessment.    Changes this shift: Patient open eyes spontaneously, not following command, restless at times, prn fent 50 mcg given x 2,oxy x 1,afebrile, sinus tach, lung sound coarse/diminished, suction small thick yellow secretion, PS 7/7 tolerating 24/7 without problem, potassium 3.7 replaced per protocol.    Plan: Continue per plan of care.    Problem: Adult Inpatient Plan of Care  Goal: Plan of Care Review  Outcome: No Change     Problem: Adult Inpatient Plan of Care  Goal: Patient-Specific Goal (Individualization)  Outcome: No Change     Problem: Adult Inpatient Plan of Care  Goal: Absence of Hospital-Acquired Illness or Injury  Outcome: No Change     Problem: Airway Clearance Ineffective  Goal: Effective Airway Clearance  Outcome: No Change

## 2020-06-05 ENCOUNTER — APPOINTMENT (OUTPATIENT)
Dept: OCCUPATIONAL THERAPY | Facility: CLINIC | Age: 40
End: 2020-06-05
Payer: MEDICAID

## 2020-06-05 LAB
ANION GAP SERPL CALCULATED.3IONS-SCNC: 7 MMOL/L (ref 3–14)
BACTERIA SPEC CULT: ABNORMAL
BUN SERPL-MCNC: 30 MG/DL (ref 7–30)
CALCIUM SERPL-MCNC: 8.7 MG/DL (ref 8.5–10.1)
CHLORIDE SERPL-SCNC: 111 MMOL/L (ref 94–109)
CO2 SERPL-SCNC: 24 MMOL/L (ref 20–32)
CREAT SERPL-MCNC: 0.84 MG/DL (ref 0.66–1.25)
ERYTHROCYTE [DISTWIDTH] IN BLOOD BY AUTOMATED COUNT: 14.6 % (ref 10–15)
GFR SERPL CREATININE-BSD FRML MDRD: >90 ML/MIN/{1.73_M2}
GLUCOSE BLDC GLUCOMTR-MCNC: 106 MG/DL (ref 70–99)
GLUCOSE SERPL-MCNC: 133 MG/DL (ref 70–99)
HCT VFR BLD AUTO: 29.8 % (ref 40–53)
HGB BLD-MCNC: 9.1 G/DL (ref 13.3–17.7)
MCH RBC QN AUTO: 29.7 PG (ref 26.5–33)
MCHC RBC AUTO-ENTMCNC: 30.5 G/DL (ref 31.5–36.5)
MCV RBC AUTO: 97 FL (ref 78–100)
PLATELET # BLD AUTO: 561 10E9/L (ref 150–450)
POTASSIUM SERPL-SCNC: 3.6 MMOL/L (ref 3.4–5.3)
POTASSIUM SERPL-SCNC: 3.7 MMOL/L (ref 3.4–5.3)
RBC # BLD AUTO: 3.06 10E12/L (ref 4.4–5.9)
SODIUM SERPL-SCNC: 142 MMOL/L (ref 133–144)
SPECIMEN SOURCE: ABNORMAL
SPECIMEN SOURCE: ABNORMAL
WBC # BLD AUTO: 9.7 10E9/L (ref 4–11)

## 2020-06-05 PROCEDURE — 97110 THERAPEUTIC EXERCISES: CPT | Mod: GO

## 2020-06-05 PROCEDURE — 25000128 H RX IP 250 OP 636: Performed by: STUDENT IN AN ORGANIZED HEALTH CARE EDUCATION/TRAINING PROGRAM

## 2020-06-05 PROCEDURE — 00000146 ZZHCL STATISTIC GLUCOSE BY METER IP

## 2020-06-05 PROCEDURE — 25000132 ZZH RX MED GY IP 250 OP 250 PS 637

## 2020-06-05 PROCEDURE — 25000132 ZZH RX MED GY IP 250 OP 250 PS 637: Performed by: INTERNAL MEDICINE

## 2020-06-05 PROCEDURE — 85027 COMPLETE CBC AUTOMATED: CPT | Performed by: STUDENT IN AN ORGANIZED HEALTH CARE EDUCATION/TRAINING PROGRAM

## 2020-06-05 PROCEDURE — 40000275 ZZH STATISTIC RCP TIME EA 10 MIN

## 2020-06-05 PROCEDURE — 25000128 H RX IP 250 OP 636

## 2020-06-05 PROCEDURE — 27210437 ZZH NUTRITION PRODUCT SEMIELEM INTERMED LITER

## 2020-06-05 PROCEDURE — 25000132 ZZH RX MED GY IP 250 OP 250 PS 637: Performed by: STUDENT IN AN ORGANIZED HEALTH CARE EDUCATION/TRAINING PROGRAM

## 2020-06-05 PROCEDURE — 25800030 ZZH RX IP 258 OP 636

## 2020-06-05 PROCEDURE — 94003 VENT MGMT INPAT SUBQ DAY: CPT

## 2020-06-05 PROCEDURE — 80048 BASIC METABOLIC PNL TOTAL CA: CPT | Performed by: STUDENT IN AN ORGANIZED HEALTH CARE EDUCATION/TRAINING PROGRAM

## 2020-06-05 PROCEDURE — 84132 ASSAY OF SERUM POTASSIUM: CPT | Performed by: NURSE PRACTITIONER

## 2020-06-05 PROCEDURE — 20000004 ZZH R&B ICU UMMC

## 2020-06-05 PROCEDURE — 25000128 H RX IP 250 OP 636: Performed by: NURSE PRACTITIONER

## 2020-06-05 PROCEDURE — 25000132 ZZH RX MED GY IP 250 OP 250 PS 637: Performed by: NURSE PRACTITIONER

## 2020-06-05 PROCEDURE — 99233 SBSQ HOSP IP/OBS HIGH 50: CPT | Performed by: INTERNAL MEDICINE

## 2020-06-05 RX ADMIN — POTASSIUM CHLORIDE 20 MEQ: 1.5 POWDER, FOR SOLUTION ORAL at 06:29

## 2020-06-05 RX ADMIN — CHLORHEXIDINE GLUCONATE 0.12% ORAL RINSE 15 ML: 1.2 LIQUID ORAL at 16:07

## 2020-06-05 RX ADMIN — ASPIRIN 81 MG CHEWABLE TABLET 81 MG: 81 TABLET CHEWABLE at 08:51

## 2020-06-05 RX ADMIN — LEVETIRACETAM 1000 MG: 100 SOLUTION ORAL at 19:55

## 2020-06-05 RX ADMIN — TICAGRELOR 90 MG: 90 TABLET ORAL at 08:52

## 2020-06-05 RX ADMIN — ACETAMINOPHEN 650 MG: 325 TABLET ORAL at 18:23

## 2020-06-05 RX ADMIN — QUETIAPINE FUMARATE 25 MG: 25 TABLET ORAL at 19:54

## 2020-06-05 RX ADMIN — ACETAMINOPHEN 650 MG: 325 TABLET ORAL at 06:28

## 2020-06-05 RX ADMIN — HEPARIN SODIUM 5000 UNITS: 5000 INJECTION, SOLUTION INTRAVENOUS; SUBCUTANEOUS at 21:58

## 2020-06-05 RX ADMIN — Medication 25 MG: at 19:54

## 2020-06-05 RX ADMIN — Medication 40 MG: at 08:53

## 2020-06-05 RX ADMIN — LEVETIRACETAM 1000 MG: 100 SOLUTION ORAL at 08:53

## 2020-06-05 RX ADMIN — VANCOMYCIN HYDROCHLORIDE 1750 MG: 10 INJECTION, POWDER, LYOPHILIZED, FOR SOLUTION INTRAVENOUS at 13:14

## 2020-06-05 RX ADMIN — OXYCODONE HYDROCHLORIDE 5 MG: 5 TABLET ORAL at 03:05

## 2020-06-05 RX ADMIN — ACETAMINOPHEN 650 MG: 325 TABLET ORAL at 12:34

## 2020-06-05 RX ADMIN — VANCOMYCIN HYDROCHLORIDE 1750 MG: 10 INJECTION, POWDER, LYOPHILIZED, FOR SOLUTION INTRAVENOUS at 01:15

## 2020-06-05 RX ADMIN — OXYCODONE HYDROCHLORIDE 5 MG: 5 TABLET ORAL at 22:29

## 2020-06-05 RX ADMIN — POTASSIUM CHLORIDE 20 MEQ: 29.8 INJECTION, SOLUTION INTRAVENOUS at 10:52

## 2020-06-05 RX ADMIN — MULTIVITAMIN 15 ML: LIQUID ORAL at 08:52

## 2020-06-05 RX ADMIN — Medication 10 ML: at 19:53

## 2020-06-05 RX ADMIN — HEPARIN SODIUM 5000 UNITS: 5000 INJECTION, SOLUTION INTRAVENOUS; SUBCUTANEOUS at 14:46

## 2020-06-05 RX ADMIN — CHLORHEXIDINE GLUCONATE 0.12% ORAL RINSE 15 ML: 1.2 LIQUID ORAL at 19:54

## 2020-06-05 RX ADMIN — TICAGRELOR 90 MG: 90 TABLET ORAL at 19:54

## 2020-06-05 RX ADMIN — Medication 25 MG: at 08:52

## 2020-06-05 RX ADMIN — PIPERACILLIN AND TAZOBACTAM 4.5 G: 4; .5 INJECTION, POWDER, FOR SOLUTION INTRAVENOUS at 03:05

## 2020-06-05 RX ADMIN — Medication 40 MG: at 19:55

## 2020-06-05 RX ADMIN — CHLORHEXIDINE GLUCONATE 0.12% ORAL RINSE 15 ML: 1.2 LIQUID ORAL at 08:52

## 2020-06-05 RX ADMIN — CHLORHEXIDINE GLUCONATE 0.12% ORAL RINSE 15 ML: 1.2 LIQUID ORAL at 12:34

## 2020-06-05 RX ADMIN — HEPARIN SODIUM 5000 UNITS: 5000 INJECTION, SOLUTION INTRAVENOUS; SUBCUTANEOUS at 06:28

## 2020-06-05 RX ADMIN — FENTANYL CITRATE 100 MCG: 50 INJECTION, SOLUTION INTRAMUSCULAR; INTRAVENOUS at 23:00

## 2020-06-05 RX ADMIN — PIPERACILLIN AND TAZOBACTAM 4.5 G: 4; .5 INJECTION, POWDER, FOR SOLUTION INTRAVENOUS at 08:52

## 2020-06-05 RX ADMIN — PIPERACILLIN AND TAZOBACTAM 4.5 G: 4; .5 INJECTION, POWDER, FOR SOLUTION INTRAVENOUS at 15:53

## 2020-06-05 RX ADMIN — FENTANYL CITRATE 100 MCG: 50 INJECTION, SOLUTION INTRAMUSCULAR; INTRAVENOUS at 06:28

## 2020-06-05 RX ADMIN — PIPERACILLIN AND TAZOBACTAM 4.5 G: 4; .5 INJECTION, POWDER, FOR SOLUTION INTRAVENOUS at 21:58

## 2020-06-05 RX ADMIN — FENTANYL CITRATE 50 MCG: 50 INJECTION, SOLUTION INTRAMUSCULAR; INTRAVENOUS at 03:05

## 2020-06-05 RX ADMIN — ACETAMINOPHEN 650 MG: 325 TABLET ORAL at 23:00

## 2020-06-05 ASSESSMENT — ACTIVITIES OF DAILY LIVING (ADL)
ADLS_ACUITY_SCORE: 18
ADLS_ACUITY_SCORE: 18
ADLS_ACUITY_SCORE: 16
ADLS_ACUITY_SCORE: 18
ADLS_ACUITY_SCORE: 18
ADLS_ACUITY_SCORE: 16

## 2020-06-05 NOTE — PLAN OF CARE
ICU End of Shift Summary. See flowsheets for vital signs and detailed assessment.    Changes this shift: Pt remains restless thrashing head and extremities in bed; not following commands but withdraws extremities. Opens eyes spontaneously but does not track. VSS. Remains afebrile.    Plan:  Trach/Peg on Monday, Work towards LTAC after. Continue to monitor. Alert team to any changes.       Problem: Adult Inpatient Plan of Care  Goal: Plan of Care Review  6/5/2020 1838 by Johann Herrera, RN  Outcome: No Change     Problem: Adult Inpatient Plan of Care  Goal: Patient-Specific Goal (Individualization)  Outcome: No Change     Problem: Adult Inpatient Plan of Care  Goal: Absence of Hospital-Acquired Illness or Injury  6/5/2020 1838 by Johann Herrera, RN  Outcome: No Change     Problem: Adult Inpatient Plan of Care  Goal: Optimal Comfort and Wellbeing  Outcome: No Change     Problem: Adult Inpatient Plan of Care  Goal: Readiness for Transition of Care  Outcome: No Change     Problem: Adult Inpatient Plan of Care  Goal: Rounds/Family Conference  Outcome: No Change     Problem: Adjustment to Therapy (Extracorporeal Life Support/ECMO)  Goal: Optimal Adjustment to Therapy  Outcome: No Change     Problem: Airway Clearance Ineffective  Goal: Effective Airway Clearance  6/5/2020 1838 by Johann Herrera, RN  Outcome: No Change

## 2020-06-05 NOTE — PROGRESS NOTES
Care Coordinator - Discharge Planning    Admission Date/Time:  5/17/2020  Attending MD:  Milagro Cevallos MD      Data  Date of initial CC assessment:  5/22/20  Chart reviewed, discussed with interdisciplinary team.   Patient was admitted for:   1. ST elevation MI (STEMI) (H)    2. Cardiac arrest (H)    3. Cardiogenic shock (H)    4. Posturing episode         Assessment   Pt admitted on 5/17/2020 for post cardiac arrest. He was placed on [facility] ECMO post cath lab. He was decannulated on 5/19. Brain MRI on 5/26 shows diffuse acute/subacute infarcts in bilat cerebral hemispheres, corpus callosum, and periventricular white matter c/w evolving diffuse hypoxic ischemic brain injury. Goals of care conference with family on 6/2 resulted in family decision to continue with aggressive cares to include tracheostomy, peg placement and transfer to [facility] for intensive vent weaning and rehab.     Coordination of Care and Referrals:   I did bring up LTACH at the care conference with family. Today, CSI team contacted me with the update that pt will have trach/peg placement on Monday 6/8 and will need LTACH transfer mid-week. I have left voicemail for pt's father Gerald to discuss referral but have not heard back from him as of this writing. I have completed referral to [facility] today per care conference discussion that included fact that [facility] is currently the only LTACH in the [facility]. RNCC to follow up with Gerald on Monday to further discuss the referral.     Plan  Anticipated Discharge Date:  Week of 6/8  Anticipated Discharge Plan:  LTACH for vent weaning and rehab    Cl Barragan RN, BSN  ICU Care Coordinator  Pager: [phone]  Phone:  [phone]

## 2020-06-05 NOTE — PROGRESS NOTES
[facility] General ID Service: Follow Up Note      Patient:  Scot Bishop   Date of birth 1980, Medical record number [id number]  Date of Visit:  06/05/2020  Date of Admission: 5/17/2020         Assessment and Recommendations:   ID Problem List:  1. Fever  2. Leukocytosis  3. Acinetobacter (not baumannii) on sputum culture   4. Blood culture with MRSE  5. Right groin fluid collection  6. Acute respiratory failure  7. STEMI with PEA/VF out of hospital arrest s/p NAZIA to LAD  8. Hypoxic brain injury  9. VA-ECMO (5/17-5/19)    Recommendations:  1. Continue vancomycin for now  2. If stable and no additional positive cultures by 6/6, would stop vancomycin and monitor  3. Continue Zosyn  4. Awaiting results of repeat cultures      Dr. Knutson will be on call for general ID over the weekend (6/6, 6/7) please contact her for questions over the weekend.    Discussion:  Scot Bishop is a 39 year old male with no significant known past medical history who presented to [facility] on 5/17/20 after a PEA/V Fib arrest and STEMI. Was taken to the cath lab emergently and had NAZIA placed to LAD, found to have LAD thrombotic occlusion s/p successful aspiration thrombectomy, was cannulated for [facility] ECMO (5/17-5/19).      Wound care RN and ENT have been following for tongue ulceration and split, though does not appear infected. Acute hypoxic respiratory failure and Acinetobacter (not baumanni) on sputum cultures (5/20, 5/22, 5/27), has been on Meropenem --> Unasyn without clearing. Fever to 101.5F on 5/22 and has continued to have intermittent fevers since, temperature to 102F on 5/28, has been persistently febrile >100.6 since 6/1 despite scheduled tylenol. Increase in fever curve not correlated with transition to Unasyn. Last chest CT was 5/17 and lungs with effusions c/w and atelectasis/consolidation in setting of recent arrest. CXR on 6/3 with increased LLL opacity, CT with some GGO though no consolidation. Sputum (6/2)  growing Acinetobacter (not baumannii) and Candida albicans.     1/2 blood cultures 6/1 with MRSE. Started on vancomycin and awaiting results of follow up cultures. Most likely contamination as additional cultures have not had any growth.    CT Chest/abd/pelvis with fluid collection in right groin with reactive lymphadenopathy. Subsequent US showed fluid collections and no evidence of pseudoaneurysm. Hematoma vs abscess though at this point favor hematoma.    Hematoma may be contributing to fevers. No clear evidence of pneumonia or additional source at this time. Central fevers considered given hypoxic ischemic brain injury. Has shown improvement in fever curve since 6/3, though not necessarily correlated with antibiotic changes (Unasyn --> Zosyn and addition of Vancomycin). Continue Vancomycin and Zosyn today. If no additional positive cultures by 6/6 would stop vancomycin and monitor. If spiking fevers again off of vancomycin would persue further evaluation of groin site hematomas.       Don't hesitate to call with questions.     Attestation:  I have reviewed today's vital signs, medications, labs and imaging.  Emy Arana PA-C, Pager # [phone]            Interval History:       No significant changes overnight. Fever curve improving. Less thrashing this morning. Bleeding from right groin site overnight.          Review of Systems:   Unable to obtain, intubated.          Current Antimicrobials   Current:  - Zosyn (start 6/3)  - Vancomycin (start 6/3)    Prior:  - Unasyn (5/29-6/3)  - Meropenem (5/22-5/29)  - Zosyn (5/17-5/22)  - Vancomycin (5/17-5/23)         Physical Exam:   Ranges for vital signs:  Temp:  [98.2  F (36.8  C)-99.9  F (37.7  C)] 98.2  F (36.8  C)  Heart Rate:  [] 116  Resp:  [14-24] 15  BP: (103-112)/(63-73) 112/73  MAP:  [67 mmHg-94 mmHg] 88 mmHg  Arterial Line BP: ()/(54-76) 120/71  FiO2 (%):  [30 %] 30 %  SpO2:  [93 %-100 %] 100 %    Intake/Output Summary (Last 24 hours) at  6/4/2020 1418  Last data filed at 6/4/2020 1400  Gross per 24 hour   Intake 3962 ml   Output 1987 ml   Net 1975 ml     Exam:  GENERAL:  well-developed, well-nourished. Intubated.  HEENT:  Head is normocephalic, atraumatic   EYES:  Eyes have anicteric sclerae without conjunctival injection.    ENT:  Oropharynx is moist. ETT tube in place, tongue ulceration not well visualized  LUNGS:  Clear to auscultation bilateral, +mechanical ventilation   CARDIOVASCULAR:  Regular rate and rhythm with no murmurs, gallops or rubs.  ABDOMEN:  Normal bowel sounds, soft, nontender. Rectal tube in place.  : condom catheter. R groin site without erythema or induration. Scab present.   SKIN:  No acute rashes.  Line(s) are in place without any surrounding erythema or exudate.  EXTREMITIES: no swelling or erythema of ankles, knees, hips, elbows, wrists, or shoulders  NEUROLOGIC: Opens eyes spontaneously, moves extremities. Does not follow commands.         Laboratory Data:   Reviewed.  Pertinent for:    Culture data:  6/3/20 blood culture: NGTD  6/2/20 sputum culture: Acinetobacter species, not baumannii; light growth of Candida albicans  6/1/20 blood culture left arm: Staph epi positive for mec A gene  6/1/20 blood culture right hand: NGTD  6/1/20 C.diff toxin by PCR: negative  5/27/20 sputum culture: Acinetobacter species, not baumannii (intermediate: ceftazidime); light growth of Candida albicans  5/22/20 sputum culture: Acinetobacter species, not baumannii  5/21/20 sputum culture: Acinetobacter species, not baumannii  5/20/20 sputum culture: Acinetobacter species, not baumannii (intermediate ceftazidime, ceftriaxone); CoNS  5/19/20 sputum culture: MSSA    Negative blood cultures (5/17, 5/18, 5/19, 5/20, 5/22, 5/27, 5/28)    Inflammatory Markers    Recent Labs   Lab Test 05/20/20  0356 05/19/20  0415 05/18/20  0353 05/17/20  1615   SED 83* 33* 8 7   .0* 89.0* 16.0* <2.9       Hematology Studies    Recent Labs   Lab Test  06/05/20  0910 06/04/20  0416 06/03/20  0527 06/02/20  1059   WBC 9.7 12.1* 12.7* 14.1*   HGB 9.1* 9.2* 9.2* 9.0*   MCV 97 99 98 99   * 577* 643* 642*     Recent Labs   Lab Test 06/02/20  1059 05/17/20  1615   ANEU 10.0* 20.9*   AEOS 0.6 0.1       Metabolic Studies     Recent Labs   Lab Test 06/05/20  0910 06/05/20  0524 06/04/20  0416 06/03/20  0527 06/02/20  0345     --  144 143 142   POTASSIUM 3.6 3.7 3.7 3.6 3.7   CHLORIDE 111*  --  111* 111* 112*   CO2 24  --  29 25 25   BUN 30  --  36* 33* 30   CR 0.84  --  0.93 0.88 0.84   GFRESTIMATED >90  --  >90 >90 >90       Hepatic Studies    Recent Labs   Lab Test 06/04/20  0416 06/03/20  0527 06/02/20  0345   BILITOTAL 0.6 0.5 0.5   ALKPHOS 127 131 124   ALBUMIN 3.0* 3.0* 2.8*   AST 57* 63* 66*   * 123* 114*            Imaging:   CT CHEST/ABD/PELVIS W/O CONTRAST (6/3/20)  IMPRESSION: In this patient with history of persistent fever, the  current scan shows:   1. Hypodense collection measuring up to 5 cm in the right inguinal  region abutting the femoral vascular bundle with additional collection  anterior to this collection measuring up to 4.8 cm with fluid/fluid  level and dependent hyperdense contents; collections may represent  hematoma/abscess and/or pseudoaneurysm. Consider right inguinal  Doppler ultrasound for further evaluation.  2. No intra-abdominal or intrathoracic abscess or collection.  3. Patchy groundglass opacity in the right upper lobe may represent  atelectasis versus inflammatory change.  4. Support devices as described above.    US LOWER EXTREMITY ARTERIAL RIGHT (6/3/20)  Impression:   1. No evidence of pseudoaneurysm or arteriovenous fistula.  2. Slightly increased size of right groin fluid collections which may  represent hematomas.    CT HEAD W/O CONTRAST (6/3/20)  Impression:   1. No change in imaging features of diffuse hypoxic ischemic injury.  2. No evident new intracranial pathology.

## 2020-06-05 NOTE — PLAN OF CARE
ICU End of Shift Summary. See flowsheets for vital signs and detailed assessment.    Changes this shift: Patient restless, not following command, prn fentanyl and oxycodone given with minimum effect, afebrile, tolerating pressure support, small white secretion suctioned, potassium replaced per protocol.    Plan: Continue to monitor patient, trach and peg on Monday.    Problem: Adult Inpatient Plan of Care  Goal: Plan of Care Review  Outcome: No Change  Goal: Absence of Hospital-Acquired Illness or Injury  Outcome: No Change     Problem: Airway Clearance Ineffective  Goal: Effective Airway Clearance  Outcome: No Change

## 2020-06-05 NOTE — PLAN OF CARE
Discharge Planner OT   Patient plan for discharge: Unable to state  Current status: Pt with no meaningful command following, spontaneous movement observed in all extremities aside from RUE with minimal distal movement only. Promoted functional ROM and joint integrity by providing PROM to BUE/BLE in all planes of motion.  Vital signs assessed throughout session and remain stable on CPAP with 30% FiO2, PEEP 7, RR 14 and O2 100%.    Barriers to return to prior living situation: medical status, cognition, impaired ADLs  Recommendations for discharge: TCU pending medical course  Rationale for recommendations: Pt is currently below functional baseline for ADLs, transfers, and functional mobility, would benefit from continued therapy to address above deficits and maximize strength/independence in order to return to PLOF.         Entered by: Melanie Silveira 06/05/2020 10:12 AM

## 2020-06-05 NOTE — PROGRESS NOTES
Cardiology Progress Note  Scot Bishop MRN: [id number]  Age: 39 year old, : 1980              Assessment and Plan:     Scot Bishop is a 39 year old male who was admitted on 2020 for cardiac arrest. Pt reportedly had chest pain that started on 20. He was using Drano on his pipes at home and did not initially seek medical attention for CP and SOB since it said on the box that Drano can cause those symptoms. He took a shower and had syncopal episode after coming out of the shower. EMS was called; initial rhythm asystole. With chest compressions pt eventually had PEA and then eventually had VF in the field. After multiple cycles of epinephrine had ROSC. He was intubated in the field.   Pt was brought to [facility] ED where he regained a pulse and was brought to the Cath Lab for coronary angiogram. Initially, BP was 90s/60s as he was being transported from ED but he had recurrent cardiac arrest on arrival to Cath Lab. ECG showed ST elevation in aVR. He was promptly placed on VA ECMO. He had thrombotic occlusion of proximal LAD and underwent successful aspiration thrombectomy and PCI w/ NAZIA of proximal and mid LAD.     Interval events: Blood culture grew GPC and sputum culture grew GNR. Added vanc/zosyn per ID. CT CAP negative for infection but showed right inguinal 5 cm fluid collection. RLE US showed hematoma, negative for PSA. Also had CT head for lack of spontaneous movement in the LLE - CTH negative.   Pt's father, Gerald, visited patient. He was understandably tearful and grieving. Met w/ Gerald and Melyssa (Palliative Care SW) to answer Gerald's questions and provide support particularly regarding pending decision for trach/PEG. Per Gerald, family was planning to discuss plan. Spoke w/ Gerald and his wife Vandana; family has decided to move forward w/ trach/PEG.      Today's plan:   -continue vanc/zosyn   -continue pressure support    -trach/PEG at bedside per SICU on Monday     -per ID, if cultures remain negative by tomorrow and if he continues to remain afebrile, will plan to discontinue vancomycin     Neurology: Intubated. Initial CT head negative. Cooled to 34 degrees on arrival; rewarmed 5/19 AM. EEG 5/25 showed severe diffuse encephalopathy without seizures or epileptiform discharges.  Repeat CTH 5/25: multifocal acute/subacute cerebral infarcts   Brain MRI 5/26: diffuse acute/subacute infarcts in bilat cerebral hemispheres, corpus callosum, and periventricular white matter c/w evolving diffuse hypoxic ischemic brain injury   Episode of LUE posturing and rhythmic tremors overnight on 5/27. EEG restarted 5/28.  -EEG discontinued per Neuro    -off sedation; fentanyl bumps and PO oxycodone as needed for signs of pain   -seroquel 25 mg PM  -Neurocrit following; appreciate their recs    Cardiovascular / Hemodynamics: Refractory VF arrest    S/p NAZIA to proximal-mid LAD  Sinus tachycardia   Peripheral VA ECMO inserted for cardiac arrest. LA 16 initially. Suspect ongoing tachycardia d/t evolving MI and post-arrest state. ECMO decannulation at bedside on 5/19; IABP discontinued 5/20. 23 second run on VT overnight on 5/27.   TTE 5/26/20: EF 35-40%, RV normal   EKG 5/29: sinus tachycardia, QTc 467.   -continue ASA 81mg daily and ticagrelor 90mg BID  -continue metoprolol tartrate 25 mg BID   -hold statin for now given likely hepatic injury during arrest  -hold ACE/ARB for now given likely reduced renal fxn after arrest   Pulmonary: Acute hypoxic respiratory failure  COVID negative  Admission CT chest showed bilateral consolidations c/w aspiration PNA. Had thick secretions that required bagging by RT on 5/20. Bronchoscopy 5/21. Increasing oxygen requirements and pt-vent dyssynchrony on 5/22. Pulmonary re-consulted, infectious work up, pt sedated. Significant pressure injury of tongue w/ splitting of tongue noted by [facility] nurse today. No active bleeding.    Vent settings this AM: PS 7/7 30%  ABG  5/31: 7.48/34/116/26   CXR 6/3: mild pulmonary edema    -ENT consult 5/27 for pressure injury of tongue   -has been tolerating long periods of pressure support   -mucomyst and albuterol nebs PRN   -growing GNR in sputum; unasyn d/c'd, added vanc/zosyn 6/4 per ID  -CXR as needed   -ABGs PRN    GI and Nutrition: Shock liver  GIB, resolved   , AST 57. T bili 0.6.    -post-pyloric FT placement 5/26  -monitor LFTs daily   -continue TF   -bowel regimen discontinued given loose stools  -C diff culture 6/1 negative    -GI Prophylaxis: Protonix BID for UGIB   Renal, Fluid and Electrolytes: Acute kidney injury, resolved    Cr improved to 0.93 this AM. 2L UOP and net positive 675 mL yesterday. Some urine unmeasured d/t condom cath. Na 142 today.   -monitor I/O  -maintain K>3.8 and Mg>2    Infectious Disease: Aspiration pneumonia  Leukocytosis  WBC 12.1, tmax 99.4F. Grew GNR, acinetobacter (not baumannii) in sputum cx. Blood cx 6/1 grew GPCs, sputum cx 6/2 grew non-lactose fermenting GNR. ID consulted 6/3 for persistent fevers.    -vancomycin/zosyn x5 days (5/17-5/22) for asp PNA   -discontinued meropenem changed to unasyn 5/29  -unasyn discontinued and added vanc/zosyn 6/4  -CT CAP negative for other source of infection   -scheduled acetaminophen for fevers  -C diff culture 6/1 negative      -monitor for signs of infection given lines and leukocytosis   Hematology and Oncology: Thrombocytosis, improving   Plts 577 today. Likely reactive given ongoing acinetobacter infxn and recent ECMO. Receiving ASA/ticagrelor for NAZIA. Transfused 1 unit PRBCs 5/21. Hgb 9.2 today. No signs of active bleeding.    -right inguinal hematoma on US at old ECMO cannulae insertion site, stable   -peripheral smear showed normal platelet morphology   -daily CBC   -transfuse for Hgb<7   -DVT PPX: subcutaneous heparin   Endocrinology: No known medical history. BG elevated.  -not requiring insulin gtt  -HgbA1c 5.2   Lines:       R radial arterial  "line May 17, 2020  ETT May 17, 2020  OG tube May 17, 2020  NJ tube May 26, 2020   Restraint: mitts    Current lines are required for patient management       Family update by me today: Yes     Code Status: Full code     The pt was discussed and evaluated with Dr. Alanis, attending physician, who agrees with the assessment and plan above.     Milagro Cevallos  Cardiology PGY4            Objective     /73 (BP Location: Left arm)   Pulse 129   Temp 98.2  F (36.8  C) (Axillary)   Resp 15   Ht 1.676 m (5' 6\")   Wt 91.5 kg (201 lb 11.5 oz)   SpO2 100%   BMI 32.56 kg/m    Temp:  [98.2  F (36.8  C)-99.9  F (37.7  C)] 98.2  F (36.8  C)  Heart Rate:  [] 116  Resp:  [14-24] 15  BP: (103-112)/(63-73) 112/73  MAP:  [67 mmHg-94 mmHg] 88 mmHg  Arterial Line BP: ()/(54-76) 120/71  FiO2 (%):  [30 %] 30 %  SpO2:  [93 %-100 %] 100 %  Wt Readings from Last 2 Encounters:   06/03/20 91.5 kg (201 lb 11.5 oz)     I/O last 3 completed shifts:  In: 3897 [I.V.:1127; NG/GT:1450]  Out: 2130 [Urine:1465; Emesis/NG output:175; Stool:490]      GENERAL APPEARANCE: Intubated. NAD.  HEENT: No icterus, PERRL 3 mm, ETT in place, NJ tube in place  CARDIOVASCULAR: sinus tachycardia, normal S1 and S2, no S3 or S4 and no murmur, click or rub. Normal PMI. Pulses dopplerable.  RESP: Clear bilaterally. Mechanical ventilation.    GASTRO: Soft, bowel sounds hyperactive.  GENITOURINARY: Condom cath in place.  EXTREMITIES: Warm, no edema. RLE warm, DP pulses +2.   NEURO: Intubated, Pupils equal and reactive, 3 mm. Moving extremities and opening eyes although not following commands. Withdraws to pain.    INTEGUMENTARY: No rashes. Line sites CDI. Bilaterally groin sites CDI; right groin ecchymotic, incision intact with area of firmness on medial aspect of incision.  LINES/TUBES/DRAINS: R radial Arterial line. ETT. OG. NJ.          Data:     Vent Settings:  Resp: 15 SpO2: 100 % O2 Device: Mechanical Ventilator      Arterial Blood Gas: "   Recent Labs   Lab 20  0853   PH 7.48*   PCO2 34*   PO2 116*   HCO3 26   O2PER 30       Vitals:    20 0400 20 0600 20 0300   Weight: 93.8 kg (206 lb 12.7 oz) 93.6 kg (206 lb 5.6 oz) 91.5 kg (201 lb 11.5 oz)   I/O last 3 completed shifts:  In: 3897 [I.V.:1127; NG/GT:1450]  Out: 2130 [Urine:1465; Emesis/NG output:175; Stool:490]  Recent Labs   Lab 20  0910 20  0524 20  0416     --  144   POTASSIUM 3.6 3.7 3.7   CHLORIDE 111*  --  111*   CO2 24  --  29   ANIONGAP 7  --  5   *  --  118*   BUN 30  --  36*   CR 0.84  --  0.93   TEN 8.7  --  9.0     No components found for: URINE   Recent Labs   Lab 20  0520  0345   AST 57* 63* 66*   * 123* 114*   BILITOTAL 0.6 0.5 0.5   ALBUMIN 3.0* 3.0* 2.8*   PROTTOTAL 7.9 8.3 7.6   ALKPHOS 127 131 124     Temp: 98.2  F (36.8  C) Temp src: AxillaryTemp  Min: 98.2  F (36.8  C)  Max: 99.9  F (37.7  C)   Recent Labs   Lab 20  0910 20  0416 20  0520  1059 20  0345   WBC 9.7 12.1* 12.7* 14.1* 12.6*   HGB 9.1* 9.2* 9.2* 9.0* 8.5*   HCT 29.8* 29.8* 30.7* 29.3* 27.8*   MCV 97 99 98 99 99   RDW 14.6 15.0 15.4* 15.3* 15.4*   * 577* 643* 642* 614*     No lab results found in last 7 days.  Recent Labs   Lab 20  0910 20  0416 20  0527 20  0345 20  0349   * 118* 120* 115* 117*       All imaging personally reviewed:  Recent Results (from the past 24 hour(s))   Echocardiogram Complete    Narrative    [id number]  [id number]  [id number]  [id number]^GRIS^YOSHI           [facility],[place]  Echocardiography Laboratory  [address] [place] [id number]     Name: FAINA FORRESTER  MRN: [id number]  : 1980  Study Date: 2020 08:16 AM  Age: 39 yrs  Gender: Male  Patient Location: [facility]  Reason For Study: Cardiac Arrest  Ordering Physician: YOSHI LANDRY  Performed By: Denisse Johnson RDCS     BSA: 2.1  m2  Height: 66 in  Weight: 231 lb  BP: 132/58 mmHg  _____________________________________________________________________________  __        Procedure  Complete Portable Echo Adult. Technically difficult study.Extremely poor  acoustic windows.  _____________________________________________________________________________  __        Interpretation Summary  VA ECMO at 3.7L/min. Technically difficult study. Extremely poor acoustic  windows.  Severely (EF <30%) reduced left ventricular function on extremely limited  views.  The right ventricle cannot be assessed.  No pericardial effusion is present.  Previous study not available for comparison.  _____________________________________________________________________________  __        Left Ventricle  Left ventricular wall thickness cannot evaluate. Left ventricular size is  normal. Severely (EF <30%) reduced left ventricular function is present. Left  ventricular diastolic function is not assessable. Severe diffuse hypokinesis  is present.     Right Ventricle  The right ventricle cannot be assessed. Right ventricular function cannot be  assessed due to poor image quality.     Mitral Valve  The mitral valve cannot be assessed.        Aortic Valve  The aortic valve opens with each cardiac cycle. On Doppler interrogation,  there is no significant stenosis or regurgitation.     Tricuspid Valve  The tricuspid valve cannot be assessed. Pulmonary artery systolic pressure  cannot be assessed.     Pulmonic Valve  The pulmonic valve cannot be assessed.     Vessels  The aorta root cannot be assessed. The thoracic aorta cannot be assessed.  Venous ECMO cannula is visualized traversing the IVC.     Pericardium  No pericardial effusion is present.     Compared to Previous Study  Previous study not available for comparison.     _____________________________________________________________________________  __                       Report approved by: Ashlee Izquierdo 05/18/2020  09:18 AM                 _____________________________________________________________________________  __                Medications     Current Facility-Administered Medications   Medication     0.9% sodium chloride BOLUS     0.9% sodium chloride BOLUS     acetaminophen (TYLENOL) tablet 650 mg     acetylcysteine (MUCOMYST) 10 % nebulizer solution 4 mL     albuterol (PROVENTIL) neb solution 2.5 mg     artificial tears ophthalmic ointment     aspirin (ASA) chewable tablet 81 mg     calcium chloride in  mL intermittent infusion 1 g     chlorhexidine (PERIDEX) 0.12 % solution 15 mL     dextrose 10% infusion     glucose gel 15-30 g    Or     dextrose 50 % injection 25-50 mL    Or     glucagon injection 1 mg     fentaNYL (PF) (SUBLIMAZE) injection  mcg     heparin ANTICOAGULANT injection 5,000 Units     heparin lock flush 10 UNIT/ML injection 5-10 mL     heparin lock flush 10 UNIT/ML injection 5-10 mL     ipratropium - albuterol 0.5 mg/2.5 mg/3 mL (DUONEB) neb solution 3 mL     levalbuterol (XOPENEX) neb solution 1.25 mg     levETIRAcetam (KEPPRA) solution 1,000 mg     lidocaine (LMX4) cream     lidocaine 1 % 0.1-1 mL     magnesium sulfate 2 g in water intermittent infusion     magnesium sulfate 4 g in 100 mL sterile water (premade)     metoprolol tartrate (LOPRESSOR) half-tab 25 mg     midazolam (VERSED) injection 1-2 mg     multivitamins w/minerals (CERTAVITE) liquid 15 mL     naloxone (NARCAN) injection 0.1-0.4 mg     oxyCODONE (ROXICODONE) tablet 5 mg     pantoprazole (PROTONIX) 2 mg/mL suspension 40 mg     piperacillin-tazobactam (ZOSYN) 4.5 g vial to attach to  mL bag     potassium chloride (KLOR-CON) Packet 20-40 mEq     potassium chloride 10 mEq in 100 mL intermittent infusion with 10 mg lidocaine     potassium chloride 10 mEq in 100 mL sterile water intermittent infusion (premix)     potassium chloride 20 mEq in 50 mL intermittent infusion     potassium chloride ER (KLOR-CON M) CR tablet 20-40  mEq     QUEtiapine (SEROquel) tablet 25 mg     sodium chloride (NEBUSAL) 3 % neb solution 3 mL     sodium chloride (PF) 0.9% PF flush 10-20 mL     sodium chloride (PF) 0.9% PF flush 3 mL     sodium chloride (PF) 0.9% PF flush 3 mL     sodium phosphate 10 mmol in D5W intermittent infusion     sodium phosphate 15 mmol in D5W intermittent infusion     sodium phosphate 20 mmol in D5W intermittent infusion     sodium phosphate 25 mmol in D5W intermittent infusion     ticagrelor (BRILINTA) tablet 90 mg     vancomycin (VANCOCIN) 1,750 mg in sodium chloride 0.9 % 250 mL intermittent infusion                        I have seen and examined the patient with the [facility] team. I agree with the assessment and plan of the note above.I have reviewed pertinent labs.     Jefferson Alanis MD  Interventional Cardiology  Pager: [phone]

## 2020-06-06 LAB
ANION GAP SERPL CALCULATED.3IONS-SCNC: 5 MMOL/L (ref 3–14)
BASE EXCESS BLDA CALC-SCNC: 0.1 MMOL/L
BUN SERPL-MCNC: 29 MG/DL (ref 7–30)
CALCIUM SERPL-MCNC: 8.4 MG/DL (ref 8.5–10.1)
CHLORIDE SERPL-SCNC: 111 MMOL/L (ref 94–109)
CO2 SERPL-SCNC: 26 MMOL/L (ref 20–32)
CREAT SERPL-MCNC: 0.82 MG/DL (ref 0.66–1.25)
ERYTHROCYTE [DISTWIDTH] IN BLOOD BY AUTOMATED COUNT: 14.6 % (ref 10–15)
GFR SERPL CREATININE-BSD FRML MDRD: >90 ML/MIN/{1.73_M2}
GLUCOSE SERPL-MCNC: 112 MG/DL (ref 70–99)
HCO3 BLD-SCNC: 24 MMOL/L (ref 21–28)
HCT VFR BLD AUTO: 26 % (ref 40–53)
HGB BLD-MCNC: 8 G/DL (ref 13.3–17.7)
MAGNESIUM SERPL-MCNC: 2 MG/DL (ref 1.6–2.3)
MCH RBC QN AUTO: 30.2 PG (ref 26.5–33)
MCHC RBC AUTO-ENTMCNC: 30.8 G/DL (ref 31.5–36.5)
MCV RBC AUTO: 98 FL (ref 78–100)
O2/TOTAL GAS SETTING VFR VENT: 30 %
OXYHGB MFR BLD: 99 % (ref 92–100)
PCO2 BLD: 35 MM HG (ref 35–45)
PH BLD: 7.44 PH (ref 7.35–7.45)
PHOSPHATE SERPL-MCNC: 3.6 MG/DL (ref 2.5–4.5)
PLATELET # BLD AUTO: 474 10E9/L (ref 150–450)
PO2 BLD: 154 MM HG (ref 80–105)
POTASSIUM SERPL-SCNC: 3.5 MMOL/L (ref 3.4–5.3)
RBC # BLD AUTO: 2.65 10E12/L (ref 4.4–5.9)
SODIUM SERPL-SCNC: 143 MMOL/L (ref 133–144)
WBC # BLD AUTO: 12.3 10E9/L (ref 4–11)

## 2020-06-06 PROCEDURE — 25000128 H RX IP 250 OP 636: Performed by: NURSE PRACTITIONER

## 2020-06-06 PROCEDURE — 25000128 H RX IP 250 OP 636: Performed by: STUDENT IN AN ORGANIZED HEALTH CARE EDUCATION/TRAINING PROGRAM

## 2020-06-06 PROCEDURE — 82805 BLOOD GASES W/O2 SATURATION: CPT

## 2020-06-06 PROCEDURE — 25000132 ZZH RX MED GY IP 250 OP 250 PS 637

## 2020-06-06 PROCEDURE — 25000128 H RX IP 250 OP 636

## 2020-06-06 PROCEDURE — 80048 BASIC METABOLIC PNL TOTAL CA: CPT | Performed by: STUDENT IN AN ORGANIZED HEALTH CARE EDUCATION/TRAINING PROGRAM

## 2020-06-06 PROCEDURE — 25000132 ZZH RX MED GY IP 250 OP 250 PS 637: Performed by: NURSE PRACTITIONER

## 2020-06-06 PROCEDURE — 83735 ASSAY OF MAGNESIUM: CPT | Performed by: STUDENT IN AN ORGANIZED HEALTH CARE EDUCATION/TRAINING PROGRAM

## 2020-06-06 PROCEDURE — 25000128 H RX IP 250 OP 636: Performed by: INTERNAL MEDICINE

## 2020-06-06 PROCEDURE — 25800030 ZZH RX IP 258 OP 636

## 2020-06-06 PROCEDURE — 40000275 ZZH STATISTIC RCP TIME EA 10 MIN

## 2020-06-06 PROCEDURE — 94003 VENT MGMT INPAT SUBQ DAY: CPT

## 2020-06-06 PROCEDURE — 20000004 ZZH R&B ICU UMMC

## 2020-06-06 PROCEDURE — 25000132 ZZH RX MED GY IP 250 OP 250 PS 637: Performed by: STUDENT IN AN ORGANIZED HEALTH CARE EDUCATION/TRAINING PROGRAM

## 2020-06-06 PROCEDURE — 25000132 ZZH RX MED GY IP 250 OP 250 PS 637: Performed by: INTERNAL MEDICINE

## 2020-06-06 PROCEDURE — 85027 COMPLETE CBC AUTOMATED: CPT | Performed by: STUDENT IN AN ORGANIZED HEALTH CARE EDUCATION/TRAINING PROGRAM

## 2020-06-06 PROCEDURE — 84100 ASSAY OF PHOSPHORUS: CPT | Performed by: STUDENT IN AN ORGANIZED HEALTH CARE EDUCATION/TRAINING PROGRAM

## 2020-06-06 PROCEDURE — 27210437 ZZH NUTRITION PRODUCT SEMIELEM INTERMED LITER

## 2020-06-06 RX ORDER — QUETIAPINE FUMARATE 25 MG/1
25 TABLET, FILM COATED ORAL 2 TIMES DAILY PRN
Status: DISCONTINUED | OUTPATIENT
Start: 2020-06-06 | End: 2020-06-08

## 2020-06-06 RX ORDER — HALOPERIDOL 5 MG/ML
2 INJECTION INTRAMUSCULAR ONCE
Status: COMPLETED | OUTPATIENT
Start: 2020-06-06 | End: 2020-06-06

## 2020-06-06 RX ORDER — QUETIAPINE FUMARATE 25 MG/1
25 TABLET, FILM COATED ORAL ONCE
Status: COMPLETED | OUTPATIENT
Start: 2020-06-06 | End: 2020-06-06

## 2020-06-06 RX ADMIN — FENTANYL CITRATE 100 MCG: 50 INJECTION, SOLUTION INTRAMUSCULAR; INTRAVENOUS at 02:44

## 2020-06-06 RX ADMIN — FENTANYL CITRATE 50 MCG: 50 INJECTION, SOLUTION INTRAMUSCULAR; INTRAVENOUS at 19:24

## 2020-06-06 RX ADMIN — CHLORHEXIDINE GLUCONATE 0.12% ORAL RINSE 15 ML: 1.2 LIQUID ORAL at 19:49

## 2020-06-06 RX ADMIN — QUETIAPINE FUMARATE 25 MG: 25 TABLET ORAL at 16:16

## 2020-06-06 RX ADMIN — CHLORHEXIDINE GLUCONATE 0.12% ORAL RINSE 15 ML: 1.2 LIQUID ORAL at 15:27

## 2020-06-06 RX ADMIN — ACETAMINOPHEN 650 MG: 325 TABLET ORAL at 12:04

## 2020-06-06 RX ADMIN — Medication 25 MG: at 08:41

## 2020-06-06 RX ADMIN — OXYCODONE HYDROCHLORIDE 5 MG: 5 TABLET ORAL at 19:49

## 2020-06-06 RX ADMIN — Medication 40 MG: at 08:41

## 2020-06-06 RX ADMIN — ACETAMINOPHEN 650 MG: 325 TABLET ORAL at 23:47

## 2020-06-06 RX ADMIN — ASPIRIN 81 MG CHEWABLE TABLET 81 MG: 81 TABLET CHEWABLE at 08:41

## 2020-06-06 RX ADMIN — TICAGRELOR 90 MG: 90 TABLET ORAL at 08:41

## 2020-06-06 RX ADMIN — MULTIVITAMIN 15 ML: LIQUID ORAL at 08:41

## 2020-06-06 RX ADMIN — MIDAZOLAM 2 MG: 1 INJECTION INTRAMUSCULAR; INTRAVENOUS at 04:36

## 2020-06-06 RX ADMIN — PIPERACILLIN AND TAZOBACTAM 4.5 G: 4; .5 INJECTION, POWDER, FOR SOLUTION INTRAVENOUS at 22:48

## 2020-06-06 RX ADMIN — VANCOMYCIN HYDROCHLORIDE 1750 MG: 10 INJECTION, POWDER, LYOPHILIZED, FOR SOLUTION INTRAVENOUS at 12:51

## 2020-06-06 RX ADMIN — PIPERACILLIN AND TAZOBACTAM 4.5 G: 4; .5 INJECTION, POWDER, FOR SOLUTION INTRAVENOUS at 03:43

## 2020-06-06 RX ADMIN — LEVETIRACETAM 1000 MG: 100 SOLUTION ORAL at 19:50

## 2020-06-06 RX ADMIN — ACETAMINOPHEN 650 MG: 325 TABLET ORAL at 05:23

## 2020-06-06 RX ADMIN — Medication 40 MG: at 19:50

## 2020-06-06 RX ADMIN — OXYCODONE HYDROCHLORIDE 5 MG: 5 TABLET ORAL at 23:47

## 2020-06-06 RX ADMIN — FENTANYL CITRATE 100 MCG: 50 INJECTION, SOLUTION INTRAMUSCULAR; INTRAVENOUS at 04:27

## 2020-06-06 RX ADMIN — HEPARIN SODIUM 5000 UNITS: 5000 INJECTION, SOLUTION INTRAVENOUS; SUBCUTANEOUS at 22:48

## 2020-06-06 RX ADMIN — ACETAMINOPHEN 650 MG: 325 TABLET ORAL at 18:12

## 2020-06-06 RX ADMIN — Medication 25 MG: at 19:49

## 2020-06-06 RX ADMIN — LEVETIRACETAM 1000 MG: 100 SOLUTION ORAL at 08:41

## 2020-06-06 RX ADMIN — HALOPERIDOL LACTATE 2 MG: 5 INJECTION, SOLUTION INTRAMUSCULAR at 05:14

## 2020-06-06 RX ADMIN — CHLORHEXIDINE GLUCONATE 0.12% ORAL RINSE 15 ML: 1.2 LIQUID ORAL at 12:04

## 2020-06-06 RX ADMIN — HEPARIN SODIUM 5000 UNITS: 5000 INJECTION, SOLUTION INTRAVENOUS; SUBCUTANEOUS at 05:14

## 2020-06-06 RX ADMIN — PIPERACILLIN AND TAZOBACTAM 4.5 G: 4; .5 INJECTION, POWDER, FOR SOLUTION INTRAVENOUS at 15:27

## 2020-06-06 RX ADMIN — TICAGRELOR 90 MG: 90 TABLET ORAL at 19:49

## 2020-06-06 RX ADMIN — QUETIAPINE FUMARATE 25 MG: 25 TABLET ORAL at 19:49

## 2020-06-06 RX ADMIN — HEPARIN SODIUM 5000 UNITS: 5000 INJECTION, SOLUTION INTRAVENOUS; SUBCUTANEOUS at 14:19

## 2020-06-06 RX ADMIN — Medication 5 ML: at 19:50

## 2020-06-06 RX ADMIN — POTASSIUM CHLORIDE 20 MEQ: 1.5 POWDER, FOR SOLUTION ORAL at 02:44

## 2020-06-06 RX ADMIN — MAGNESIUM SULFATE HEPTAHYDRATE 2 G: 40 INJECTION, SOLUTION INTRAVENOUS at 02:52

## 2020-06-06 RX ADMIN — PIPERACILLIN AND TAZOBACTAM 4.5 G: 4; .5 INJECTION, POWDER, FOR SOLUTION INTRAVENOUS at 11:18

## 2020-06-06 RX ADMIN — VANCOMYCIN HYDROCHLORIDE 1750 MG: 10 INJECTION, POWDER, LYOPHILIZED, FOR SOLUTION INTRAVENOUS at 00:20

## 2020-06-06 RX ADMIN — CHLORHEXIDINE GLUCONATE 0.12% ORAL RINSE 15 ML: 1.2 LIQUID ORAL at 08:41

## 2020-06-06 RX ADMIN — QUETIAPINE FUMARATE 25 MG: 25 TABLET ORAL at 00:20

## 2020-06-06 RX ADMIN — MELATONIN TAB 3 MG 10 MG: 3 TAB at 00:20

## 2020-06-06 RX ADMIN — FENTANYL CITRATE 50 MCG/HR: 0.05 INJECTION, SOLUTION INTRAMUSCULAR; INTRAVENOUS at 04:40

## 2020-06-06 ASSESSMENT — ACTIVITIES OF DAILY LIVING (ADL)
ADLS_ACUITY_SCORE: 22
ADLS_ACUITY_SCORE: 20
ADLS_ACUITY_SCORE: 18

## 2020-06-06 ASSESSMENT — MIFFLIN-ST. JEOR: SCORE: 1769.75

## 2020-06-06 NOTE — PLAN OF CARE
Assumed care [id number]  Major Shift Events:  Very restless/agitated, gave prn seroquel 25 mg, calmed down after. Fentanyl @ 100ml/hr. Opens eyes, not tracking, not following commands, moves all extremities. Pressure support 7/5, 30% FiO2. Condom cath replaced. TF @ goal. Rectal tube in place. OG to LIS.   Plan: Continue with plan of care update MD with changes  For vital signs and complete assessments, please see documentation flowsheets.       Problem: Adult Inpatient Plan of Care  Goal: Plan of Care Review  6/6/2020 1823 by Jocy Flores, RN  Outcome: No Change  6/6/2020 0634 by Rika Escalante, RN  Outcome: No Change     Problem: Airway Clearance Ineffective  Goal: Effective Airway Clearance  6/6/2020 1823 by Jocy Flores, RN  Outcome: No Change  6/6/2020 0634 by Rika Escalante, RN  Outcome: No Change

## 2020-06-06 NOTE — PROVIDER NOTIFICATION
Notified cards team about restless , constantly moving his  legs . scheduled Seroquel , prn oxycodone and fentanyl given with little effect . Patient had a run of 10 beats of VT in the rate 138 . Spoke to Dr Claudine Rhodes about the above . One time of 25 mg of Seroquel and melatonin issue . No new order of lab issued . Will monitor closely .

## 2020-06-06 NOTE — PLAN OF CARE
Major Shift Events:  Restless . Opens his eyes , not following commands or tracking . Withdraws from pain .  Thrashing head  LUE and BLE . Please MD notification notes . Calmer after 2 mg iv Versed given  . Started on fentanyl gtt for pain .  Restrain to left arm applied for safety . Hemodynamically stable . MAP > 65 . Tele SR/ST  . Has a run of VT . Potassium and magnesium replaced . Afebrile . ABG Done .   Tolerated PS 7. PEEP 5  ,FIO2 30% . WBC trending up .   Plan: PEG/trach placement on Monday . Continue to monitor   For vital signs and complete assessments, please see documentation flowsheets.      Problem: Airway Clearance Ineffective  Goal: Effective Airway Clearance  6/6/2020 0634 by Rika Escalante, RN  Outcome: No Change  6/5/2020 1838 by Johann Herrera, RN  Outcome: No Change     Problem: Restraint for Non-Violent/Non-Self-Destructive Behavior  Goal: Prevent/Manage Potential Problems  Description: Maintain safety of patient and others during period of restraint.  Promote psychological and physical wellbeing.  Prevent injury to skin and involved body parts.  Promote nutrition, hydration, and elimination.  Outcome: No Change

## 2020-06-06 NOTE — PROGRESS NOTES
Cardiology Progress Note  Scot Bishop MRN: [id number]  Age: 39 year old, : 1980              Assessment and Plan:     Scot Bishop is a 39 year old male who was admitted on 2020 for cardiac arrest. Pt reportedly had chest pain that started on 20. He was using Drano on his pipes at home and did not initially seek medical attention for CP and SOB since it said on the box that Drano can cause those symptoms. He took a shower and had syncopal episode after coming out of the shower. EMS was called; initial rhythm asystole. With chest compressions pt eventually had PEA and then eventually had VF in the field. After multiple cycles of epinephrine had ROSC. He was intubated in the field.   Pt was brought to [facility] ED where he regained a pulse and was brought to the Cath Lab for coronary angiogram. Initially, BP was 90s/60s as he was being transported from ED but he had recurrent cardiac arrest on arrival to Cath Lab. ECG showed ST elevation in aVR. He was promptly placed on VA ECMO. He had thrombotic occlusion of proximal LAD and underwent successful aspiration thrombectomy and PCI w/ NAZIA of proximal and mid LAD.     Interval events:   - Agitated overnight, received haldol and versed  - Low grade temps overnight, continuing IV Vanco and IV zosyn      Today's plan:   - Can consider weaning vancomycin today   - Ordered seroquel 25mg BID PRN for agiation   - Trach/PEG Monday    Neurology: Intubated. Initial CT head negative. Cooled to 34 degrees on arrival; rewarmed  AM. EEG  showed severe diffuse encephalopathy without seizures or epileptiform discharges.  Repeat CTH : multifocal acute/subacute cerebral infarcts   Brain MRI : diffuse acute/subacute infarcts in bilat cerebral hemispheres, corpus callosum, and periventricular white matter c/w evolving diffuse hypoxic ischemic brain injury   Episode of LUE posturing and rhythmic tremors overnight on . EEG  restarted 5/28.  -EEG discontinued per Neuro    -off sedation; fentanyl bumps and PO oxycodone as needed for signs of pain   -seroquel 25 mg PM  -Neurocrit following; appreciate their recs    Cardiovascular / Hemodynamics: Refractory VF arrest    S/p NAZIA to proximal-mid LAD  Sinus tachycardia   Peripheral VA ECMO inserted for cardiac arrest. LA 16 initially. Suspect ongoing tachycardia d/t evolving MI and post-arrest state. ECMO decannulation at bedside on 5/19; IABP discontinued 5/20. 23 second run on VT overnight on 5/27.   TTE 5/26/20: EF 35-40%, RV normal   EKG 5/29: sinus tachycardia, QTc 467.   -continue ASA 81mg daily and ticagrelor 90mg BID  -continue metoprolol tartrate 25 mg BID   -hold statin for now given likely hepatic injury during arrest  -hold ACE/ARB for now given likely reduced renal fxn after arrest   Pulmonary: Acute hypoxic respiratory failure  COVID negative  Admission CT chest showed bilateral consolidations c/w aspiration PNA. Had thick secretions that required bagging by RT on 5/20. Bronchoscopy 5/21. Increasing oxygen requirements and pt-vent dyssynchrony on 5/22. Pulmonary re-consulted, infectious work up, pt sedated. 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 No active bleeding.    Vent settings this AM: PS 7/7 30%  ABG 5/31: 7.48/34/116/26   CXR 6/3: mild pulmonary edema    -ENT consult 5/27 for pressure injury of tongue   -has been tolerating long periods of pressure support   -mucomyst and albuterol nebs PRN   -growing GNR in sputum; unasyn d/c'd, added vanc/zosyn 6/4 per ID  -CXR as needed   -ABGs PRN    GI and Nutrition: Shock liver  GIB, resolved   , AST 57. T bili 0.6.    -post-pyloric FT placement 5/26  -monitor LFTs daily   -continue TF   -bowel regimen discontinued given loose stools  -C diff culture 6/1 negative    -GI Prophylaxis: Protonix BID for UGIB   Renal, Fluid and Electrolytes: Acute kidney injury, resolved    Cr  "improved to 0.93 this AM. 2L UOP and net positive 675 mL yesterday. Some urine unmeasured d/t condom cath. Na 142 today.   -monitor I/O  -maintain K>3.8 and Mg>2    Infectious Disease: Aspiration pneumonia  Leukocytosis  WBC 12.1, tmax 99.4F. Grew GNR, acinetobacter (not baumannii) in sputum cx. Blood cx 6/1 grew GPCs, sputum cx 6/2 grew non-lactose fermenting GNR. ID consulted 6/3 for persistent fevers.    -vancomycin/zosyn x5 days (5/17-5/22) for asp PNA   -discontinued meropenem changed to unasyn 5/29  -unasyn discontinued and added vanc/zosyn 6/4  -CT CAP negative for other source of infection   -scheduled acetaminophen for fevers  -C diff culture 6/1 negative      -monitor for signs of infection given lines and leukocytosis   Hematology and Oncology: Thrombocytosis, improving   Plts 577 today. Likely reactive given ongoing acinetobacter infxn and recent ECMO. Receiving ASA/ticagrelor for NAZIA. Transfused 1 unit PRBCs 5/21. Hgb 9.2 today. No signs of active bleeding.    -right inguinal hematoma on US at old ECMO cannulae insertion site, stable   -peripheral smear showed normal platelet morphology   -daily CBC   -transfuse for Hgb<7   -DVT PPX: subcutaneous heparin   Endocrinology: No known medical history. BG elevated.  -not requiring insulin gtt  -HgbA1c 5.2   Lines:       R radial arterial line May 17, 2020  ETT May 17, 2020  OG tube May 17, 2020  NJ tube May 26, 2020   Restraint: mitts    Current lines are required for patient management       Family update by me today: Yes     Code Status: Full code     The pt was discussed and evaluated with Dr. Alanis, attending physician, who agrees with the assessment and plan above.     Lainey Snow MD  Cardiology Fellow  PGY4          Objective     /70 (BP Location: Left arm)   Pulse 129   Temp 98.1  F (36.7  C) (Axillary)   Resp 12   Ht 1.676 m (5' 6\")   Wt 91.7 kg (202 lb 2.6 oz)   SpO2 99%   BMI 32.63 kg/m    Temp:  [97.3  F (36.3  C)-98.4  F " (36.9  C)] 98.1  F (36.7  C)  Heart Rate:  [] 98  Resp:  [12-20] 12  BP: (107-112)/(70-73) 107/70  MAP:  [66 mmHg-94 mmHg] 66 mmHg  Arterial Line BP: ()/(50-84) 90/52  FiO2 (%):  [30 %] 30 %  SpO2:  [96 %-100 %] 99 %  Wt Readings from Last 2 Encounters:   06/06/20 91.7 kg (202 lb 2.6 oz)     I/O last 3 completed shifts:  In: 3406.33 [I.V.:1251.33; NG/GT:835]  Out: 3100 [Urine:1775; Emesis/NG output:225; Stool:1100]      GENERAL APPEARANCE: Intubated. NAD.  HEENT: No icterus, PERRL 3 mm, ETT in place, NJ tube in place  CARDIOVASCULAR: sinus tachycardia, normal S1 and S2, no S3 or S4 and no murmur, click or rub. Normal PMI. Pulses dopplerable.  RESP: Clear bilaterally. Mechanical ventilation.    GASTRO: Soft, bowel sounds hyperactive.  GENITOURINARY: Condom cath in place.  EXTREMITIES: Warm, no edema. RLE warm, DP pulses +2.   NEURO: Intubated, Pupils equal and reactive, 3 mm. Moving extremities and opening eyes although not following commands. Withdraws to pain.    INTEGUMENTARY: No rashes. Line sites CDI. Bilaterally groin sites CDI; right groin ecchymotic, incision intact with area of firmness on medial aspect of incision.  LINES/TUBES/DRAINS: R radial Arterial line. ETT. OG. NJ.          Data:     Vent Settings:  Resp: 12 SpO2: 99 % O2 Device: Mechanical Ventilator      Arterial Blood Gas:   Recent Labs   Lab 06/06/20  0228 05/31/20  0853   PH 7.44 7.48*   PCO2 35 34*   PO2 154* 116*   HCO3 24 26   O2PER 30.0 30       Vitals:    06/01/20 0600 06/03/20 0300 06/06/20 0400   Weight: 93.6 kg (206 lb 5.6 oz) 91.5 kg (201 lb 11.5 oz) 91.7 kg (202 lb 2.6 oz)   I/O last 3 completed shifts:  In: 3406.33 [I.V.:1251.33; NG/GT:835]  Out: 3100 [Urine:1775; Emesis/NG output:225; Stool:1100]  Recent Labs   Lab 06/06/20  0200 06/05/20  0910    142   POTASSIUM 3.5 3.6   CHLORIDE 111* 111*   CO2 26 24   ANIONGAP 5 7   * 133*   BUN 29 30   CR 0.82 0.84   TEN 8.4* 8.7     No components found for: URINE    Recent Labs   Lab 20  0345   AST 57* 63* 66*   * 123* 114*   BILITOTAL 0.6 0.5 0.5   ALBUMIN 3.0* 3.0* 2.8*   PROTTOTAL 7.9 8.3 7.6   ALKPHOS 127 131 124     Temp: 98.1  F (36.7  C) Temp src: AxillaryTemp  Min: 97.3  F (36.3  C)  Max: 98.4  F (36.9  C)   Recent Labs   Lab 20  1059   WBC 12.3* 9.7 12.1* 12.7* 14.1*   HGB 8.0* 9.1* 9.2* 9.2* 9.0*   HCT 26.0* 29.8* 29.8* 30.7* 29.3*   MCV 98 97 99 98 99   RDW 14.6 14.6 15.0 15.4* 15.3*   * 561* 577* 643* 642*     No lab results found in last 7 days.  Recent Labs   Lab 20  0345   * 133* 118* 120* 115*       All imaging personally reviewed:  Recent Results (from the past 24 hour(s))   Echocardiogram Complete    Narrative    [id number]  [id number]  [id number]  [id number]^GRIS^YOSHI           [facility]  Echocardiography Laboratory  [address] [place] [id number]     Name: FAINA FORRESTER  MRN: [id number]  : 1980  Study Date: 2020 08:16 AM  Age: 39 yrs  Gender: Male  Patient Location: [facility]  Reason For Study: Cardiac Arrest  Ordering Physician: YOSHI LANDRY  Performed By: Denisse Johnson RDCS     BSA: 2.1 m2  Height: 66 in  Weight: 231 lb  BP: 132/58 mmHg  _____________________________________________________________________________  __        Procedure  Complete Portable Echo Adult. Technically difficult study.Extremely poor  acoustic windows.  _____________________________________________________________________________  __        Interpretation Summary  VA ECMO at 3.7L/min. Technically difficult study. Extremely poor acoustic  windows.  Severely (EF <30%) reduced left ventricular function on extremely limited  views.  The right ventricle cannot be assessed.  No pericardial effusion is present.  Previous study not available for  comparison.  _____________________________________________________________________________  __        Left Ventricle  Left ventricular wall thickness cannot evaluate. Left ventricular size is  normal. Severely (EF <30%) reduced left ventricular function is present. Left  ventricular diastolic function is not assessable. Severe diffuse hypokinesis  is present.     Right Ventricle  The right ventricle cannot be assessed. Right ventricular function cannot be  assessed due to poor image quality.     Mitral Valve  The mitral valve cannot be assessed.        Aortic Valve  The aortic valve opens with each cardiac cycle. On Doppler interrogation,  there is no significant stenosis or regurgitation.     Tricuspid Valve  The tricuspid valve cannot be assessed. Pulmonary artery systolic pressure  cannot be assessed.     Pulmonic Valve  The pulmonic valve cannot be assessed.     Vessels  The aorta root cannot be assessed. The thoracic aorta cannot be assessed.  Venous ECMO cannula is visualized traversing the IVC.     Pericardium  No pericardial effusion is present.     Compared to Previous Study  Previous study not available for comparison.     _____________________________________________________________________________  __                       Report approved by: Ashlee Izquierdo 05/18/2020 09:18 AM                 _____________________________________________________________________________  __                Medications     Current Facility-Administered Medications   Medication     0.9% sodium chloride BOLUS     0.9% sodium chloride BOLUS     acetaminophen (TYLENOL) tablet 650 mg     acetylcysteine (MUCOMYST) 10 % nebulizer solution 4 mL     albuterol (PROVENTIL) neb solution 2.5 mg     artificial tears ophthalmic ointment     aspirin (ASA) chewable tablet 81 mg     calcium chloride in  mL intermittent infusion 1 g     chlorhexidine (PERIDEX) 0.12 % solution 15 mL     dextrose 10% infusion     glucose gel 15-30  g    Or     dextrose 50 % injection 25-50 mL    Or     glucagon injection 1 mg     fentaNYL (PF) (SUBLIMAZE) injection  mcg     fentaNYL (SUBLIMAZE) infusion     heparin ANTICOAGULANT injection 5,000 Units     heparin lock flush 10 UNIT/ML injection 5-10 mL     heparin lock flush 10 UNIT/ML injection 5-10 mL     ipratropium - albuterol 0.5 mg/2.5 mg/3 mL (DUONEB) neb solution 3 mL     levalbuterol (XOPENEX) neb solution 1.25 mg     levETIRAcetam (KEPPRA) solution 1,000 mg     lidocaine (LMX4) cream     lidocaine 1 % 0.1-1 mL     magnesium sulfate 2 g in water intermittent infusion     magnesium sulfate 4 g in 100 mL sterile water (premade)     melatonin tablet 10 mg     metoprolol tartrate (LOPRESSOR) half-tab 25 mg     midazolam (VERSED) injection 1-2 mg     multivitamins w/minerals (CERTAVITE) liquid 15 mL     naloxone (NARCAN) injection 0.1-0.4 mg     oxyCODONE (ROXICODONE) tablet 5 mg     pantoprazole (PROTONIX) 2 mg/mL suspension 40 mg     piperacillin-tazobactam (ZOSYN) 4.5 g vial to attach to  mL bag     potassium chloride (KLOR-CON) Packet 20-40 mEq     potassium chloride 10 mEq in 100 mL intermittent infusion with 10 mg lidocaine     potassium chloride 10 mEq in 100 mL sterile water intermittent infusion (premix)     potassium chloride 20 mEq in 50 mL intermittent infusion     potassium chloride ER (KLOR-CON M) CR tablet 20-40 mEq     QUEtiapine (SEROquel) tablet 25 mg     sodium chloride (NEBUSAL) 3 % neb solution 3 mL     sodium chloride (PF) 0.9% PF flush 10-20 mL     sodium chloride (PF) 0.9% PF flush 3 mL     sodium chloride (PF) 0.9% PF flush 3 mL     sodium phosphate 10 mmol in D5W intermittent infusion     sodium phosphate 15 mmol in D5W intermittent infusion     sodium phosphate 20 mmol in D5W intermittent infusion     sodium phosphate 25 mmol in D5W intermittent infusion     ticagrelor (BRILINTA) tablet 90 mg     vancomycin (VANCOCIN) 1,750 mg in sodium chloride 0.9 % 250 mL  intermittent infusion                        I have seen and examined the patient with the [facility] team. I agree with the assessment and plan of the note above.I have reviewed pertinent labs.     Jefferson Alanis MD  Interventional Cardiology  Pager: [phone]

## 2020-06-06 NOTE — PLAN OF CARE
Restraint discontinued at 0800 as patient met criteria for discontinuation.  See flowsheets.    Problem: Restraint for Non-Violent/Non-Self-Destructive Behavior  Goal: Prevent/Manage Potential Problems  Description: Maintain safety of patient and others during period of restraint.  Promote psychological and physical wellbeing.  Prevent injury to skin and involved body parts.  Promote nutrition, hydration, and elimination.  6/6/2020 1438 by Emmy Erickson, RN  Outcome: Completed

## 2020-06-06 NOTE — PROVIDER NOTIFICATION
Notified cards team about increase restless , agitation . Constantly moves his left arm and legs .  Pt looked uncomfortable . Prn fentanyl . Oxycodone and Seroquel given with no effect . Spoke to Dr Chow about the above . One time 2 mg iv versed , starting fentanyl gtt and restrain left arm for safety and to keep ETT .

## 2020-06-07 LAB
ANION GAP SERPL CALCULATED.3IONS-SCNC: 3 MMOL/L (ref 3–14)
BACTERIA SPEC CULT: NO GROWTH
BUN SERPL-MCNC: 27 MG/DL (ref 7–30)
CALCIUM SERPL-MCNC: 8.6 MG/DL (ref 8.5–10.1)
CHLORIDE SERPL-SCNC: 113 MMOL/L (ref 94–109)
CO2 SERPL-SCNC: 26 MMOL/L (ref 20–32)
CREAT SERPL-MCNC: 0.72 MG/DL (ref 0.66–1.25)
ERYTHROCYTE [DISTWIDTH] IN BLOOD BY AUTOMATED COUNT: 14.7 % (ref 10–15)
GFR SERPL CREATININE-BSD FRML MDRD: >90 ML/MIN/{1.73_M2}
GLUCOSE SERPL-MCNC: 112 MG/DL (ref 70–99)
HCT VFR BLD AUTO: 26.9 % (ref 40–53)
HGB BLD-MCNC: 8.2 G/DL (ref 13.3–17.7)
MAGNESIUM SERPL-MCNC: 2.1 MG/DL (ref 1.6–2.3)
MCH RBC QN AUTO: 30.3 PG (ref 26.5–33)
MCHC RBC AUTO-ENTMCNC: 30.5 G/DL (ref 31.5–36.5)
MCV RBC AUTO: 99 FL (ref 78–100)
PLATELET # BLD AUTO: 448 10E9/L (ref 150–450)
POTASSIUM SERPL-SCNC: 3.8 MMOL/L (ref 3.4–5.3)
RBC # BLD AUTO: 2.71 10E12/L (ref 4.4–5.9)
SODIUM SERPL-SCNC: 142 MMOL/L (ref 133–144)
SPECIMEN SOURCE: NORMAL
VANCOMYCIN SERPL-MCNC: 13.5 MG/L
WBC # BLD AUTO: 10.4 10E9/L (ref 4–11)

## 2020-06-07 PROCEDURE — G0463 HOSPITAL OUTPT CLINIC VISIT: HCPCS

## 2020-06-07 PROCEDURE — 99233 SBSQ HOSP IP/OBS HIGH 50: CPT | Mod: GC | Performed by: INTERNAL MEDICINE

## 2020-06-07 PROCEDURE — 85027 COMPLETE CBC AUTOMATED: CPT | Performed by: STUDENT IN AN ORGANIZED HEALTH CARE EDUCATION/TRAINING PROGRAM

## 2020-06-07 PROCEDURE — 83735 ASSAY OF MAGNESIUM: CPT | Performed by: STUDENT IN AN ORGANIZED HEALTH CARE EDUCATION/TRAINING PROGRAM

## 2020-06-07 PROCEDURE — 25000128 H RX IP 250 OP 636

## 2020-06-07 PROCEDURE — 25000132 ZZH RX MED GY IP 250 OP 250 PS 637: Performed by: INTERNAL MEDICINE

## 2020-06-07 PROCEDURE — 40000014 ZZH STATISTIC ARTERIAL MONITORING DAILY

## 2020-06-07 PROCEDURE — 25000128 H RX IP 250 OP 636: Performed by: NURSE PRACTITIONER

## 2020-06-07 PROCEDURE — 25000132 ZZH RX MED GY IP 250 OP 250 PS 637: Performed by: STUDENT IN AN ORGANIZED HEALTH CARE EDUCATION/TRAINING PROGRAM

## 2020-06-07 PROCEDURE — 25000132 ZZH RX MED GY IP 250 OP 250 PS 637

## 2020-06-07 PROCEDURE — 25000132 ZZH RX MED GY IP 250 OP 250 PS 637: Performed by: NURSE PRACTITIONER

## 2020-06-07 PROCEDURE — 94003 VENT MGMT INPAT SUBQ DAY: CPT

## 2020-06-07 PROCEDURE — 25800030 ZZH RX IP 258 OP 636

## 2020-06-07 PROCEDURE — 80048 BASIC METABOLIC PNL TOTAL CA: CPT | Performed by: STUDENT IN AN ORGANIZED HEALTH CARE EDUCATION/TRAINING PROGRAM

## 2020-06-07 PROCEDURE — 25000128 H RX IP 250 OP 636: Performed by: STUDENT IN AN ORGANIZED HEALTH CARE EDUCATION/TRAINING PROGRAM

## 2020-06-07 PROCEDURE — 20000004 ZZH R&B ICU UMMC

## 2020-06-07 PROCEDURE — 40000275 ZZH STATISTIC RCP TIME EA 10 MIN

## 2020-06-07 PROCEDURE — 80202 ASSAY OF VANCOMYCIN: CPT

## 2020-06-07 RX ADMIN — ACETAMINOPHEN 650 MG: 325 TABLET ORAL at 17:46

## 2020-06-07 RX ADMIN — Medication 40 MG: at 19:30

## 2020-06-07 RX ADMIN — Medication 25 MG: at 19:29

## 2020-06-07 RX ADMIN — CHLORHEXIDINE GLUCONATE 0.12% ORAL RINSE 15 ML: 1.2 LIQUID ORAL at 11:48

## 2020-06-07 RX ADMIN — LEVETIRACETAM 1000 MG: 100 SOLUTION ORAL at 19:30

## 2020-06-07 RX ADMIN — FENTANYL CITRATE 100 MCG/HR: 0.05 INJECTION, SOLUTION INTRAMUSCULAR; INTRAVENOUS at 03:17

## 2020-06-07 RX ADMIN — Medication 5 ML: at 19:30

## 2020-06-07 RX ADMIN — PIPERACILLIN AND TAZOBACTAM 4.5 G: 4; .5 INJECTION, POWDER, FOR SOLUTION INTRAVENOUS at 16:03

## 2020-06-07 RX ADMIN — MELATONIN TAB 3 MG 10 MG: 3 TAB at 19:29

## 2020-06-07 RX ADMIN — POTASSIUM CHLORIDE 20 MEQ: 1.5 POWDER, FOR SOLUTION ORAL at 04:56

## 2020-06-07 RX ADMIN — HEPARIN SODIUM 5000 UNITS: 5000 INJECTION, SOLUTION INTRAVENOUS; SUBCUTANEOUS at 21:42

## 2020-06-07 RX ADMIN — ACETAMINOPHEN 650 MG: 325 TABLET ORAL at 05:00

## 2020-06-07 RX ADMIN — HEPARIN SODIUM 5000 UNITS: 5000 INJECTION, SOLUTION INTRAVENOUS; SUBCUTANEOUS at 14:13

## 2020-06-07 RX ADMIN — QUETIAPINE FUMARATE 25 MG: 25 TABLET ORAL at 03:55

## 2020-06-07 RX ADMIN — VANCOMYCIN HYDROCHLORIDE 1750 MG: 10 INJECTION, POWDER, LYOPHILIZED, FOR SOLUTION INTRAVENOUS at 01:16

## 2020-06-07 RX ADMIN — PIPERACILLIN AND TAZOBACTAM 4.5 G: 4; .5 INJECTION, POWDER, FOR SOLUTION INTRAVENOUS at 21:42

## 2020-06-07 RX ADMIN — LEVETIRACETAM 1000 MG: 100 SOLUTION ORAL at 07:27

## 2020-06-07 RX ADMIN — PIPERACILLIN AND TAZOBACTAM 4.5 G: 4; .5 INJECTION, POWDER, FOR SOLUTION INTRAVENOUS at 03:56

## 2020-06-07 RX ADMIN — ACETAMINOPHEN 650 MG: 325 TABLET ORAL at 11:48

## 2020-06-07 RX ADMIN — CHLORHEXIDINE GLUCONATE 0.12% ORAL RINSE 15 ML: 1.2 LIQUID ORAL at 07:26

## 2020-06-07 RX ADMIN — CHLORHEXIDINE GLUCONATE 0.12% ORAL RINSE 15 ML: 1.2 LIQUID ORAL at 19:30

## 2020-06-07 RX ADMIN — TICAGRELOR 90 MG: 90 TABLET ORAL at 19:29

## 2020-06-07 RX ADMIN — PIPERACILLIN AND TAZOBACTAM 4.5 G: 4; .5 INJECTION, POWDER, FOR SOLUTION INTRAVENOUS at 09:10

## 2020-06-07 RX ADMIN — TICAGRELOR 90 MG: 90 TABLET ORAL at 07:26

## 2020-06-07 RX ADMIN — FENTANYL CITRATE 100 MCG: 50 INJECTION, SOLUTION INTRAMUSCULAR; INTRAVENOUS at 03:18

## 2020-06-07 RX ADMIN — QUETIAPINE FUMARATE 25 MG: 25 TABLET ORAL at 19:29

## 2020-06-07 RX ADMIN — CHLORHEXIDINE GLUCONATE 0.12% ORAL RINSE 15 ML: 1.2 LIQUID ORAL at 16:03

## 2020-06-07 RX ADMIN — HEPARIN SODIUM 5000 UNITS: 5000 INJECTION, SOLUTION INTRAVENOUS; SUBCUTANEOUS at 05:00

## 2020-06-07 RX ADMIN — Medication 40 MG: at 07:27

## 2020-06-07 RX ADMIN — ASPIRIN 81 MG CHEWABLE TABLET 81 MG: 81 TABLET CHEWABLE at 07:26

## 2020-06-07 RX ADMIN — MULTIVITAMIN 15 ML: LIQUID ORAL at 07:26

## 2020-06-07 RX ADMIN — Medication 25 MG: at 07:26

## 2020-06-07 ASSESSMENT — ACTIVITIES OF DAILY LIVING (ADL)
ADLS_ACUITY_SCORE: 20

## 2020-06-07 ASSESSMENT — MIFFLIN-ST. JEOR: SCORE: 1772.75

## 2020-06-07 NOTE — PHARMACY-VANCOMYCIN DOSING SERVICE
Pharmacy Vancomycin Note  Date of Service 2020  Patient's  1980   39 year old, male    Indication: Aspiration Pneumonia  Goal Trough Level: 10-15 mg/L  Day of Therapy: 5  Current Vancomycin regimen:  1750 mg IV q12h    Current estimated CrCl = Estimated Creatinine Clearance: 144.9 mL/min (based on SCr of 0.72 mg/dL).    Creatinine for last 3 days  2020:  9:10 AM Creatinine 0.84 mg/dL  2020:  2:00 AM Creatinine 0.82 mg/dL  2020:  3:33 AM Creatinine 0.72 mg/dL    Recent Vancomycin Levels (past 3 days)  2020: 12:01 AM Vancomycin Level 13.5 mg/L (11 hour)    Vancomycin IV Administrations (past 72 hours)                   vancomycin (VANCOCIN) 1,750 mg in sodium chloride 0.9 % 250 mL intermittent infusion (mg) 1,750 mg New Bag 20 0116     1,750 mg New Bag 20 1251     1,750 mg New Bag  0020     1,750 mg New Bag 20 1314     1,750 mg New Bag  0115                Nephrotoxins and other renal medications (From now, onward)    Start     Dose/Rate Route Frequency Ordered Stop    20 1500  piperacillin-tazobactam (ZOSYN) 4.5 g vial to attach to  mL bag      4.5 g  over 30 Minutes Intravenous EVERY 6 HOURS 20 1144               Contrast Orders - past 72 hours (72h ago, onward)    None          Interpretation of levels and current regimen:  Trough level is  Therapeutic    Has serum creatinine changed > 50% in last 72 hours: No    Urine output:  good urine output    Renal Function: Stable    Plan:  No further dosing needed as vancomycin has been discontinued. Goal trough would have been appropriate for indication & YOUNG at current time so no dose adjustement would have been required. Could continue this regimen in the future if needed.     Loreta Barboza, [facility]        .

## 2020-06-07 NOTE — PROGRESS NOTES
Cardiology Progress Note  Scot Bishop MRN: [id number]  Age: 39 year old, : 1980              Assessment and Plan:     Scot Bishop is a 39 year old male who was admitted on 2020 for cardiac arrest. Pt reportedly had chest pain that started on 20. He was using Drano on his pipes at home and did not initially seek medical attention for CP and SOB since it said on the box that Drano can cause those symptoms. He took a shower and had syncopal episode after coming out of the shower. EMS was called; initial rhythm asystole. With chest compressions pt eventually had PEA and then eventually had VF in the field. After multiple cycles of epinephrine had ROSC. He was intubated in the field.   Pt was brought to [facility] ED where he regained a pulse and was brought to the Cath Lab for coronary angiogram. Initially, BP was 90s/60s as he was being transported from ED but he had recurrent cardiac arrest on arrival to Cath Lab. ECG showed ST elevation in aVR. He was promptly placed on VA ECMO. He had thrombotic occlusion of proximal LAD and underwent successful aspiration thrombectomy and PCI w/ NAZIA of proximal and mid LAD.     Interval events:   - Afebrile overnight      Today's plan:   - Discontinue vancomycin   - Trach/PEG Monday    Neurology: Intubated. Initial CT head negative. Cooled to 34 degrees on arrival; rewarmed  AM. EEG  showed severe diffuse encephalopathy without seizures or epileptiform discharges.  Repeat CTH : multifocal acute/subacute cerebral infarcts   Brain MRI : diffuse acute/subacute infarcts in bilat cerebral hemispheres, corpus callosum, and periventricular white matter c/w evolving diffuse hypoxic ischemic brain injury   Episode of LUE posturing and rhythmic tremors overnight on . EEG restarted .  -EEG discontinued per Neuro    -off sedation; fentanyl bumps and PO oxycodone as needed for signs of pain   -seroquel 25 mg PM PRN    -Neurocrit following; appreciate their recs    Cardiovascular / Hemodynamics: Refractory VF arrest    S/p NAZIA to proximal-mid LAD  Sinus tachycardia   Peripheral VA ECMO inserted for cardiac arrest. LA 16 initially. Suspect ongoing tachycardia d/t evolving MI and post-arrest state. ECMO decannulation at bedside on 5/19; IABP discontinued 5/20. 23 second run on VT overnight on 5/27.   TTE 5/26/20: EF 35-40%, RV normal   EKG 5/29: sinus tachycardia, QTc 467.   -continue ASA 81mg daily and ticagrelor 90mg BID  -continue metoprolol tartrate 25 mg BID   -hold statin for now given likely hepatic injury during arrest  -hold ACE/ARB for now given likely reduced renal fxn after arrest   Pulmonary: Acute hypoxic respiratory failure  COVID negative  Admission CT chest showed bilateral consolidations c/w aspiration PNA. Had thick secretions that required bagging by RT on 5/20. Bronchoscopy 5/21. Increasing oxygen requirements and pt-vent dyssynchrony on 5/22. Pulmonary re-consulted, infectious work up, pt sedated. Significant pressure injury of tongue w/ splitting of tongue noted by [facility] nurse today. No active bleeding.    Vent settings this AM: PS 7/7 30%  ABG 5/31: 7.48/34/116/26   CXR 6/3: mild pulmonary edema    -ENT consult 5/27 for pressure injury of tongue   -has been tolerating long periods of pressure support   -mucomyst and albuterol nebs PRN   -growing GNR in sputum; unasyn d/c'd, added vanc/zosyn 6/4 per ID, discontinue vancomycin 6/7  -CXR as needed   -ABGs PRN    GI and Nutrition: Shock liver  GIB, resolved   , AST 57. T bili 0.6.    -post-pyloric FT placement 5/26  -monitor LFTs daily   -continue TF   -bowel regimen discontinued given loose stools  -C diff culture 6/1 negative    -GI Prophylaxis: Protonix BID for UGIB   Renal, Fluid and Electrolytes: Acute kidney injury, resolved    Cr improved to 0.93 this AM. 2L UOP and net positive 675 mL yesterday. Some urine unmeasured d/t condom cath. Na 142 today.  "  -monitor I/O  -maintain K>3.8 and Mg>2    Infectious Disease: Aspiration pneumonia  Leukocytosis  WBC 12.1, tmax 99.4F. Grew GNR, acinetobacter (not baumannii) in sputum cx. Blood cx 6/1 grew GPCs, sputum cx 6/2 grew non-lactose fermenting GNR. ID consulted 6/3 for persistent fevers.    -vancomycin/zosyn x5 days (5/17-5/22) for asp PNA   -discontinued meropenem changed to unasyn 5/29  -unasyn discontinued and added vanc/zosyn 6/4, vanc discontinued 6/7   -CT CAP negative for other source of infection   -scheduled acetaminophen for fevers  -C diff culture 6/1 negative      -monitor for signs of infection given lines and leukocytosis   Hematology and Oncology: Thrombocytosis, improving   Plts 577 today. Likely reactive given ongoing acinetobacter infxn and recent ECMO. Receiving ASA/ticagrelor for NAZIA. Transfused 1 unit PRBCs 5/21. Hgb 9.2 today. No signs of active bleeding.    -right inguinal hematoma on US at old ECMO cannulae insertion site, stable   -peripheral smear showed normal platelet morphology   -daily CBC   -transfuse for Hgb<7   -DVT PPX: subcutaneous heparin   Endocrinology: No known medical history. BG elevated.  -not requiring insulin gtt  -HgbA1c 5.2   Lines:       R radial arterial line May 17, 2020  ETT May 17, 2020  OG tube May 17, 2020  NJ tube May 26, 2020   Restraint: mitts    Current lines are required for patient management       Family update by me today: Yes     Code Status: Full code     The pt was discussed and evaluated with Dr. Alanis, attending physician, who agrees with the assessment and plan above.     Milagro Cevallos  Cardiology PGY4            Objective     /71 (BP Location: Left arm)   Pulse 129   Temp 98.9  F (37.2  C) (Axillary)   Resp 11   Ht 1.676 m (5' 6\")   Wt 92 kg (202 lb 13.2 oz)   SpO2 100%   BMI 32.74 kg/m    Temp:  [97.2  F (36.2  C)-98.9  F (37.2  C)] 98.9  F (37.2  C)  Heart Rate:  [] 95  Resp:  [11-14] 11  BP: (104-110)/(67-72) 108/71  MAP: "  [70 mmHg-89 mmHg] 78 mmHg  Arterial Line BP: ()/(54-78) 93/65  FiO2 (%):  [30 %] 30 %  SpO2:  [99 %-100 %] 100 %  Wt Readings from Last 2 Encounters:   06/07/20 92 kg (202 lb 13.2 oz)     I/O last 3 completed shifts:  In: 3450 [I.V.:1420; NG/GT:710]  Out: 2850 [Urine:1775; Emesis/NG output:375; Stool:700]      GENERAL APPEARANCE: Intubated. NAD.  HEENT: No icterus, PERRL 3 mm, ETT in place, NJ tube in place  CARDIOVASCULAR: sinus tachycardia, normal S1 and S2, no S3 or S4 and no murmur, click or rub. Normal PMI. Pulses dopplerable.  RESP: Clear bilaterally. Mechanical ventilation.    GASTRO: Soft, bowel sounds hyperactive.  GENITOURINARY: Condom cath in place.  EXTREMITIES: Warm, no edema. RLE warm, DP pulses +2.   NEURO: Intubated, Pupils equal and reactive, 3 mm. Moving extremities and opening eyes although not following commands. Withdraws to pain.    INTEGUMENTARY: No rashes. Line sites CDI. Bilaterally groin sites CDI; right groin ecchymotic, incision intact with area of firmness on medial aspect of incision.  LINES/TUBES/DRAINS: R radial Arterial line. ETT. OG. NJ.          Data:     Vent Settings:  Resp: 11 SpO2: 100 % O2 Device: Mechanical Ventilator      Arterial Blood Gas:   Recent Labs   Lab 06/06/20  0228   PH 7.44   PCO2 35   PO2 154*   HCO3 24   O2PER 30.0       Vitals:    06/03/20 0300 06/06/20 0400 06/07/20 0400   Weight: 91.5 kg (201 lb 11.5 oz) 91.7 kg (202 lb 2.6 oz) 92 kg (202 lb 13.2 oz)   I/O last 3 completed shifts:  In: 3450 [I.V.:1420; NG/GT:710]  Out: 2850 [Urine:1775; Emesis/NG output:375; Stool:700]  Recent Labs   Lab 06/07/20  0333 06/06/20  0200    143   POTASSIUM 3.8 3.5   CHLORIDE 113* 111*   CO2 26 26   ANIONGAP 3 5   * 112*   BUN 27 29   CR 0.72 0.82   TEN 8.6 8.4*     No components found for: URINE   Recent Labs   Lab 06/04/20  0416 06/03/20  0527 06/02/20  0345   AST 57* 63* 66*   * 123* 114*   BILITOTAL 0.6 0.5 0.5   ALBUMIN 3.0* 3.0* 2.8*   PROTTOTAL  7.9 8.3 7.6   ALKPHOS 127 131 124     Temp: 98.9  F (37.2  C) Temp src: AxillaryTemp  Min: 97.2  F (36.2  C)  Max: 98.9  F (37.2  C)   Recent Labs   Lab 20  0333 20  0200 20  0910 206 20  0527   WBC 10.4 12.3* 9.7 12.1* 12.7*   HGB 8.2* 8.0* 9.1* 9.2* 9.2*   HCT 26.9* 26.0* 29.8* 29.8* 30.7*   MCV 99 98 97 99 98   RDW 14.7 14.6 14.6 15.0 15.4*    474* 561* 577* 643*     No lab results found in last 7 days.  Recent Labs   Lab 20  0910 20  0527   * 112* 133* 118* 120*       All imaging personally reviewed:  Recent Results (from the past 24 hour(s))   Echocardiogram Complete    Narrative    [id number]  [id number]  [id number]  [id number]^GRIS^YOSHI           [facility],[place]  Echocardiography Laboratory  [address] [place] [id number]     Name: FAINA FORRESTER  MRN: [id number]  : 1980  Study Date: 2020 08:16 AM  Age: 39 yrs  Gender: Male  Patient Location: [facility]  Reason For Study: Cardiac Arrest  Ordering Physician: YOSHI LANDRY  Performed By: Denisse Johnson RDCS     BSA: 2.1 m2  Height: 66 in  Weight: 231 lb  BP: 132/58 mmHg  _____________________________________________________________________________  __        Procedure  Complete Portable Echo Adult. Technically difficult study.Extremely poor  acoustic windows.  _____________________________________________________________________________  __        Interpretation Summary  VA ECMO at 3.7L/min. Technically difficult study. Extremely poor acoustic  windows.  Severely (EF <30%) reduced left ventricular function on extremely limited  views.  The right ventricle cannot be assessed.  No pericardial effusion is present.  Previous study not available for comparison.  _____________________________________________________________________________  __        Left Ventricle  Left ventricular wall thickness cannot evaluate. Left  ventricular size is  normal. Severely (EF <30%) reduced left ventricular function is present. Left  ventricular diastolic function is not assessable. Severe diffuse hypokinesis  is present.     Right Ventricle  The right ventricle cannot be assessed. Right ventricular function cannot be  assessed due to poor image quality.     Mitral Valve  The mitral valve cannot be assessed.        Aortic Valve  The aortic valve opens with each cardiac cycle. On Doppler interrogation,  there is no significant stenosis or regurgitation.     Tricuspid Valve  The tricuspid valve cannot be assessed. Pulmonary artery systolic pressure  cannot be assessed.     Pulmonic Valve  The pulmonic valve cannot be assessed.     Vessels  The aorta root cannot be assessed. The thoracic aorta cannot be assessed.  Venous ECMO cannula is visualized traversing the IVC.     Pericardium  No pericardial effusion is present.     Compared to Previous Study  Previous study not available for comparison.     _____________________________________________________________________________  __                       Report approved by: Ashlee Izquierdo 05/18/2020 09:18 AM                 _____________________________________________________________________________  __                Medications     Current Facility-Administered Medications   Medication     0.9% sodium chloride BOLUS     0.9% sodium chloride BOLUS     acetaminophen (TYLENOL) tablet 650 mg     acetylcysteine (MUCOMYST) 10 % nebulizer solution 4 mL     artificial tears ophthalmic ointment     aspirin (ASA) chewable tablet 81 mg     calcium chloride in  mL intermittent infusion 1 g     chlorhexidine (PERIDEX) 0.12 % solution 15 mL     dextrose 10% infusion     glucose gel 15-30 g    Or     dextrose 50 % injection 25-50 mL    Or     glucagon injection 1 mg     fentaNYL (PF) (SUBLIMAZE) injection  mcg     fentaNYL (SUBLIMAZE) infusion     heparin ANTICOAGULANT injection 5,000 Units      heparin lock flush 10 UNIT/ML injection 5-10 mL     heparin lock flush 10 UNIT/ML injection 5-10 mL     ipratropium - albuterol 0.5 mg/2.5 mg/3 mL (DUONEB) neb solution 3 mL     levalbuterol (XOPENEX) neb solution 1.25 mg     levETIRAcetam (KEPPRA) solution 1,000 mg     lidocaine (LMX4) cream     lidocaine 1 % 0.1-1 mL     magnesium sulfate 2 g in water intermittent infusion     magnesium sulfate 4 g in 100 mL sterile water (premade)     melatonin tablet 10 mg     metoprolol tartrate (LOPRESSOR) half-tab 25 mg     midazolam (VERSED) injection 1-2 mg     multivitamins w/minerals (CERTAVITE) liquid 15 mL     naloxone (NARCAN) injection 0.1-0.4 mg     oxyCODONE (ROXICODONE) tablet 5 mg     pantoprazole (PROTONIX) 2 mg/mL suspension 40 mg     piperacillin-tazobactam (ZOSYN) 4.5 g vial to attach to  mL bag     potassium chloride (KLOR-CON) Packet 20-40 mEq     potassium chloride 10 mEq in 100 mL intermittent infusion with 10 mg lidocaine     potassium chloride 10 mEq in 100 mL sterile water intermittent infusion (premix)     potassium chloride 20 mEq in 50 mL intermittent infusion     potassium chloride ER (KLOR-CON M) CR tablet 20-40 mEq     QUEtiapine (SEROquel) tablet 25 mg     QUEtiapine (SEROquel) tablet 25 mg     sodium chloride (NEBUSAL) 3 % neb solution 3 mL     sodium chloride (PF) 0.9% PF flush 10-20 mL     sodium chloride (PF) 0.9% PF flush 3 mL     sodium chloride (PF) 0.9% PF flush 3 mL     sodium phosphate 10 mmol in D5W intermittent infusion     sodium phosphate 15 mmol in D5W intermittent infusion     sodium phosphate 20 mmol in D5W intermittent infusion     sodium phosphate 25 mmol in D5W intermittent infusion     ticagrelor (BRILINTA) tablet 90 mg                    I have seen and examined the patient with the [facility] team. I agree with the assessment and plan of the note above.I have reviewed pertinent labs.     Jefferson Alanis MD  Interventional Cardiology  Pager: [phone]

## 2020-06-07 NOTE — PLAN OF CARE
Major Shift Events:  continued to be restless , thrashing in bed .  New order of LUE restrains issued for safety . Opens his eyes .Not following commands and tracking . Prn and scheduled Seroquel given with small effect  .  On continuous fentanyl 100 mcg/hr .  Prn oxycodone and fentanyl also given . Hemodynamically stable . Tolerated PS 7/5/30% .   Plan: PEG and trach placement tomorrow .   For vital signs and complete assessments, please see documentation flowsheets.      Problem: Adult Inpatient Plan of Care  Goal: Absence of Hospital-Acquired Illness or Injury  6/7/2020 0636 by Rika Escalante, RN  Outcome: No Change     Problem: Adult Inpatient Plan of Care  Goal: Optimal Comfort and Wellbeing  6/7/2020 0636 by Rika Escalante, RN  Outcome: No Change     Problem: Airway Clearance Ineffective  Goal: Effective Airway Clearance  6/6/2020 1823 by Jocy Flores, RN  Outcome: No Change

## 2020-06-07 NOTE — PLAN OF CARE
ICU End of Shift Summary. See flowsheets for vital signs and detailed assessment.    Changes this shift: patient without acute event or change this shift. Continues to pressure support without issue. Opens eyes spontaneously but without tracking; withdraws from pain, but exhibits no purposeful movements.    Plan: Anticipate trach/peg tomorrow; NPO at midnight. Continue to monitor patient status; notify physician of changes.

## 2020-06-07 NOTE — PROGRESS NOTES
[facility] Nurse Inpatient Pressure Injury Assessment   Reason for consultation: Evaluate and treat tongue      ASSESSMENT  Pressure Injury: on left top of tongue, hospital acquired    This is a Medical Device Related Pressure Injury (MDRPI) due to ETT  Pressure Injury is Stage Mucosal - stable  Contributing factor of the pressure injury: pressure, microclimate and immobility  Status: Significantly deteriorated now has split tongue 5/27. Stable on 6/7  Recommend provider order: ENT now following     TREATMENT PLAN  Left top of tongue mucosal pressure injury: Good oral hygiene and routine repositioning of ETT (ensure tongue is disengaged with every reposition), attempt to not reposition ETT on wound. Offload tubing with rolled towel.    ENT recommendations:   - Keep pressure off the tongue as much as possible. DO NOT allow ETT to rest directly on anterior tongue. Frequently move ETT from side to side to decrease tongue pressure.   - Place vaseline guaze or xeroform on anterior tongue wound at all times. ONLY PLACE ONE LONG PIECE AND ENSURE DISTAL END IS OUT OF THE MOUTH to prevent aspiration of guaze  - Peridex rinses QID  - Can place soft bite block between molars laterally (avoid placing anteriorly). Can use rolled up guaze and tape or obtain an actual bite block from OR/central supplies  - Early extubation/ trach if possible to decrease pressure on tongue  - We will await care conference on Friday to determine if surgical intervention will be necessary vs healing by secondary intention    Orders Updated  WO Nurse follow-up plan:weekly  Nursing to notify the Provider(s) and re-consult the [facility] Nurse if wound(s) deteriorates or new skin concern.    Patient History  According to provider note(s):  Scot Bishop is a 39 year old male who was admitted on 5/17/2020 for cardiac arrest. Pt reportedly had chest pain that started on 5/16/20. He was using Drano on his pipes at home and did not initially seek medical attention for  CP and SOB since it said on the box that Drano can cause those symptoms. He took a shower and had syncopal episode after coming out of the shower. EMS was called; initial rhythm asystole. With chest compressions pt eventually had PEA and then eventually had VF in the field. After multiple cycles of epinephrine had ROSC. He was intubated in the field.   Pt was brought to [facility] ED where he regained a pulse and was brought to the Cath Lab for coronary angiogram. Initially, BP was 90s/60s as he was being transported from ED but he had recurrent cardiac arrest on arrival to Cath Lab. ECG showed ST elevation in aVR. He was promptly placed on VA ECMO. He had thrombotic occlusion of proximal LAD and underwent successful aspiration thrombectomy and PCI w/ NAZIA of proximal and mid LAD.     Objective Data  Containment of urine/stool: Indwelling catheter    Current Diet/ Nutrition:  Orders Placed This Encounter      NPO for Medical/Clinical Reasons Except for: NPO but receiving Tube Feeding      Output:   I/O last 3 completed shifts:  In: 3450 [I.V.:1420; NG/GT:710]  Out: 2850 [Urine:1775; Emesis/NG output:375; Stool:700]    Risk Assessment:   Sensory Perception: 2-->very limited  Moisture: 3-->occasionally moist  Activity: 1-->bedfast  Mobility: 3-->slightly limited  Nutrition: 3-->adequate  Friction and Shear: 1-->problem  Burak Score: 13      Labs:   Recent Labs   Lab 06/07/20  0333  06/04/20  0416   ALBUMIN  --   --  3.0*   HGB 8.2*   < > 9.2*   WBC 10.4   < > 12.1*    < > = values in this interval not displayed.       Physical Exam  Skin inspection: focused tongue    Wound Location:  Left top of tongue                                                                                                                       6/1 6/7-   Date of last Photo 6/7- unable to take better picture despite using tongue depressor as pt unable to  stick tongue out and tongue is not edematous either.   Wound History: Nurse had thought WOC was already following this wound, however WOC had only noted bite wound previously on underside of tongue. On 5/22 patient had bite on right side of tongue  5/27 Patient's tongue is now split. RN notified WOC and WO was coming for routine assessment. Updated team, recommended ENT involvement. Team was already coordinating a care conference for family. Updated nursing manager. Per RN, have been doing all of the interventions recommended including have been offloading ETT tubing with rolled towel.  Patient seen thrashing head side to side. Per RN this has been patient's baseline for the last couple days. Patient also has very strong cough and very strong bite.  WO requested RT readjust ETT stabilizer to see if we can offload up any higher on tubing. RT, RN, and WOC applied new ETT stabilizer and no noticeable difference in offloading.  Measurements (length x width x depth, in cm) unable to measure how far back wound goes, as pt unable to stick his tongue out.   Wound Base:  Tongue split, also appears to have missing tissue, white tissue has resolved now noted pink mucosa   Palpation of the wound bed: normal   Periwound skin: mucosa  Color: pale  Temperature: normal   Drainage:, none  Description of drainage: none  Odor: none  Pain: facial expression of distress,       Interventions  Current support surface: Standard  Low air loss mattress  Current off-loading measures: Pillows  Repositioning aid: Pillows  Visual inspection of wound(s) completed   Tube Securement: ETT stabilizer  Wound Care: was not done- unable to wrap the xerofoam gauze  Supplies: none  Educated provided: importance of repositioning, plan of care and wound progress  Education provided to: nurse  Discussed importance of:repositioning every 2 hours, off-loading pressure to wound and their role in pressure injury prevention  Discussed plan of care with Nurse,      Michelle Flores RN, CWOCN

## 2020-06-08 ENCOUNTER — APPOINTMENT (OUTPATIENT)
Dept: OCCUPATIONAL THERAPY | Facility: CLINIC | Age: 40
End: 2020-06-08
Payer: MEDICAID

## 2020-06-08 ENCOUNTER — APPOINTMENT (OUTPATIENT)
Dept: GENERAL RADIOLOGY | Facility: CLINIC | Age: 40
End: 2020-06-08
Attending: STUDENT IN AN ORGANIZED HEALTH CARE EDUCATION/TRAINING PROGRAM
Payer: MEDICAID

## 2020-06-08 LAB
ANION GAP SERPL CALCULATED.3IONS-SCNC: 5 MMOL/L (ref 3–14)
BUN SERPL-MCNC: 27 MG/DL (ref 7–30)
CALCIUM SERPL-MCNC: 8.7 MG/DL (ref 8.5–10.1)
CHLORIDE SERPL-SCNC: 110 MMOL/L (ref 94–109)
CO2 SERPL-SCNC: 26 MMOL/L (ref 20–32)
CREAT SERPL-MCNC: 0.78 MG/DL (ref 0.66–1.25)
ERYTHROCYTE [DISTWIDTH] IN BLOOD BY AUTOMATED COUNT: 14.8 % (ref 10–15)
GFR SERPL CREATININE-BSD FRML MDRD: >90 ML/MIN/{1.73_M2}
GLUCOSE SERPL-MCNC: 94 MG/DL (ref 70–99)
HCT VFR BLD AUTO: 26.2 % (ref 40–53)
HGB BLD-MCNC: 7.9 G/DL (ref 13.3–17.7)
INR PPP: 1.1 (ref 0.86–1.14)
INTERPRETATION ECG - MUSE: NORMAL
MAGNESIUM SERPL-MCNC: 2 MG/DL (ref 1.6–2.3)
MCH RBC QN AUTO: 30 PG (ref 26.5–33)
MCHC RBC AUTO-ENTMCNC: 30.2 G/DL (ref 31.5–36.5)
MCV RBC AUTO: 100 FL (ref 78–100)
PLATELET # BLD AUTO: 412 10E9/L (ref 150–450)
POTASSIUM SERPL-SCNC: 4 MMOL/L (ref 3.4–5.3)
RBC # BLD AUTO: 2.63 10E12/L (ref 4.4–5.9)
SODIUM SERPL-SCNC: 142 MMOL/L (ref 133–144)
WBC # BLD AUTO: 9.7 10E9/L (ref 4–11)

## 2020-06-08 PROCEDURE — 93005 ELECTROCARDIOGRAM TRACING: CPT

## 2020-06-08 PROCEDURE — 25000132 ZZH RX MED GY IP 250 OP 250 PS 637: Performed by: STUDENT IN AN ORGANIZED HEALTH CARE EDUCATION/TRAINING PROGRAM

## 2020-06-08 PROCEDURE — 25000132 ZZH RX MED GY IP 250 OP 250 PS 637: Performed by: INTERNAL MEDICINE

## 2020-06-08 PROCEDURE — 25000125 ZZHC RX 250: Performed by: STUDENT IN AN ORGANIZED HEALTH CARE EDUCATION/TRAINING PROGRAM

## 2020-06-08 PROCEDURE — 25000128 H RX IP 250 OP 636: Performed by: STUDENT IN AN ORGANIZED HEALTH CARE EDUCATION/TRAINING PROGRAM

## 2020-06-08 PROCEDURE — 40000986 XR CHEST PORT 1 VW

## 2020-06-08 PROCEDURE — 25000132 ZZH RX MED GY IP 250 OP 250 PS 637: Performed by: NURSE PRACTITIONER

## 2020-06-08 PROCEDURE — 40000014 ZZH STATISTIC ARTERIAL MONITORING DAILY

## 2020-06-08 PROCEDURE — 25000132 ZZH RX MED GY IP 250 OP 250 PS 637

## 2020-06-08 PROCEDURE — 85027 COMPLETE CBC AUTOMATED: CPT | Performed by: STUDENT IN AN ORGANIZED HEALTH CARE EDUCATION/TRAINING PROGRAM

## 2020-06-08 PROCEDURE — 83735 ASSAY OF MAGNESIUM: CPT | Performed by: STUDENT IN AN ORGANIZED HEALTH CARE EDUCATION/TRAINING PROGRAM

## 2020-06-08 PROCEDURE — 25000128 H RX IP 250 OP 636: Performed by: INTERNAL MEDICINE

## 2020-06-08 PROCEDURE — 20000004 ZZH R&B ICU UMMC

## 2020-06-08 PROCEDURE — 31600 PLANNED TRACHEOSTOMY: CPT

## 2020-06-08 PROCEDURE — 25000128 H RX IP 250 OP 636

## 2020-06-08 PROCEDURE — 99291 CRITICAL CARE FIRST HOUR: CPT | Performed by: INTERNAL MEDICINE

## 2020-06-08 PROCEDURE — 80048 BASIC METABOLIC PNL TOTAL CA: CPT | Performed by: STUDENT IN AN ORGANIZED HEALTH CARE EDUCATION/TRAINING PROGRAM

## 2020-06-08 PROCEDURE — 97110 THERAPEUTIC EXERCISES: CPT | Mod: GO

## 2020-06-08 PROCEDURE — 40000275 ZZH STATISTIC RCP TIME EA 10 MIN

## 2020-06-08 PROCEDURE — 0B113F4 BYPASS TRACHEA TO CUTANEOUS WITH TRACHEOSTOMY DEVICE, PERCUTANEOUS APPROACH: ICD-10-PCS | Performed by: SURGERY

## 2020-06-08 PROCEDURE — 93010 ELECTROCARDIOGRAM REPORT: CPT | Performed by: INTERNAL MEDICINE

## 2020-06-08 PROCEDURE — 85610 PROTHROMBIN TIME: CPT | Performed by: STUDENT IN AN ORGANIZED HEALTH CARE EDUCATION/TRAINING PROGRAM

## 2020-06-08 PROCEDURE — 99233 SBSQ HOSP IP/OBS HIGH 50: CPT | Performed by: NURSE PRACTITIONER

## 2020-06-08 PROCEDURE — 94003 VENT MGMT INPAT SUBQ DAY: CPT

## 2020-06-08 PROCEDURE — 25000128 H RX IP 250 OP 636: Performed by: NURSE PRACTITIONER

## 2020-06-08 RX ORDER — LISINOPRIL 5 MG/1
5 TABLET ORAL DAILY
Status: DISCONTINUED | OUTPATIENT
Start: 2020-06-10 | End: 2020-06-18

## 2020-06-08 RX ORDER — FENTANYL CITRATE 50 UG/ML
INJECTION, SOLUTION INTRAMUSCULAR; INTRAVENOUS
Status: COMPLETED
Start: 2020-06-08 | End: 2020-06-08

## 2020-06-08 RX ORDER — QUETIAPINE FUMARATE 25 MG/1
25 TABLET, FILM COATED ORAL 2 TIMES DAILY
Status: DISCONTINUED | OUTPATIENT
Start: 2020-06-08 | End: 2020-06-09

## 2020-06-08 RX ORDER — ACETAMINOPHEN 325 MG/1
650 TABLET ORAL EVERY 6 HOURS PRN
Status: DISCONTINUED | OUTPATIENT
Start: 2020-06-08 | End: 2020-06-25 | Stop reason: HOSPADM

## 2020-06-08 RX ORDER — LISINOPRIL 5 MG/1
5 TABLET ORAL DAILY
Status: DISCONTINUED | OUTPATIENT
Start: 2020-06-09 | End: 2020-06-08

## 2020-06-08 RX ORDER — FUROSEMIDE 10 MG/ML
40 INJECTION INTRAMUSCULAR; INTRAVENOUS ONCE
Status: COMPLETED | OUTPATIENT
Start: 2020-06-08 | End: 2020-06-08

## 2020-06-08 RX ORDER — VECURONIUM BROMIDE 1 MG/ML
10 INJECTION, POWDER, LYOPHILIZED, FOR SOLUTION INTRAVENOUS ONCE
Status: COMPLETED | OUTPATIENT
Start: 2020-06-08 | End: 2020-06-08

## 2020-06-08 RX ORDER — FENTANYL CITRATE 50 UG/ML
50 INJECTION, SOLUTION INTRAMUSCULAR; INTRAVENOUS ONCE
Status: COMPLETED | OUTPATIENT
Start: 2020-06-08 | End: 2020-06-08

## 2020-06-08 RX ADMIN — CHLORHEXIDINE GLUCONATE 0.12% ORAL RINSE 15 ML: 1.2 LIQUID ORAL at 15:09

## 2020-06-08 RX ADMIN — TICAGRELOR 90 MG: 90 TABLET ORAL at 16:30

## 2020-06-08 RX ADMIN — FENTANYL CITRATE 100 MCG: 50 INJECTION, SOLUTION INTRAMUSCULAR; INTRAVENOUS at 18:54

## 2020-06-08 RX ADMIN — LEVETIRACETAM 1000 MG: 100 SOLUTION ORAL at 20:03

## 2020-06-08 RX ADMIN — FENTANYL CITRATE 100 MCG: 50 INJECTION, SOLUTION INTRAMUSCULAR; INTRAVENOUS at 14:25

## 2020-06-08 RX ADMIN — FENTANYL CITRATE 100 MCG/HR: 0.05 INJECTION, SOLUTION INTRAMUSCULAR; INTRAVENOUS at 03:49

## 2020-06-08 RX ADMIN — TICAGRELOR 90 MG: 90 TABLET ORAL at 20:03

## 2020-06-08 RX ADMIN — ACETAMINOPHEN 650 MG: 325 TABLET ORAL at 00:01

## 2020-06-08 RX ADMIN — Medication 25 MG: at 20:03

## 2020-06-08 RX ADMIN — Medication 5 ML: at 20:04

## 2020-06-08 RX ADMIN — PIPERACILLIN AND TAZOBACTAM 4.5 G: 4; .5 INJECTION, POWDER, FOR SOLUTION INTRAVENOUS at 10:13

## 2020-06-08 RX ADMIN — PIPERACILLIN AND TAZOBACTAM 4.5 G: 4; .5 INJECTION, POWDER, FOR SOLUTION INTRAVENOUS at 22:23

## 2020-06-08 RX ADMIN — FENTANYL CITRATE 100 MCG: 50 INJECTION, SOLUTION INTRAMUSCULAR; INTRAVENOUS at 16:39

## 2020-06-08 RX ADMIN — CHLORHEXIDINE GLUCONATE 0.12% ORAL RINSE 15 ML: 1.2 LIQUID ORAL at 08:10

## 2020-06-08 RX ADMIN — CHLORHEXIDINE GLUCONATE 0.12% ORAL RINSE 15 ML: 1.2 LIQUID ORAL at 11:29

## 2020-06-08 RX ADMIN — PIPERACILLIN AND TAZOBACTAM 4.5 G: 4; .5 INJECTION, POWDER, FOR SOLUTION INTRAVENOUS at 03:59

## 2020-06-08 RX ADMIN — MULTIVITAMIN 15 ML: LIQUID ORAL at 08:10

## 2020-06-08 RX ADMIN — Medication 40 MG: at 20:03

## 2020-06-08 RX ADMIN — FENTANYL CITRATE 50 MCG: 50 INJECTION, SOLUTION INTRAMUSCULAR; INTRAVENOUS at 12:02

## 2020-06-08 RX ADMIN — PIPERACILLIN AND TAZOBACTAM 4.5 G: 4; .5 INJECTION, POWDER, FOR SOLUTION INTRAVENOUS at 15:09

## 2020-06-08 RX ADMIN — FENTANYL CITRATE 100 MCG: 50 INJECTION, SOLUTION INTRAMUSCULAR; INTRAVENOUS at 11:29

## 2020-06-08 RX ADMIN — CHLORHEXIDINE GLUCONATE 0.12% ORAL RINSE 15 ML: 1.2 LIQUID ORAL at 20:03

## 2020-06-08 RX ADMIN — HEPARIN SODIUM 5000 UNITS: 5000 INJECTION, SOLUTION INTRAVENOUS; SUBCUTANEOUS at 15:09

## 2020-06-08 RX ADMIN — FENTANYL CITRATE 100 MCG: 50 INJECTION, SOLUTION INTRAMUSCULAR; INTRAVENOUS at 23:07

## 2020-06-08 RX ADMIN — POTASSIUM CHLORIDE 20 MEQ: 29.8 INJECTION, SOLUTION INTRAVENOUS at 04:52

## 2020-06-08 RX ADMIN — QUETIAPINE FUMARATE 25 MG: 25 TABLET ORAL at 20:03

## 2020-06-08 RX ADMIN — LEVETIRACETAM 1000 MG: 100 SOLUTION ORAL at 08:11

## 2020-06-08 RX ADMIN — FUROSEMIDE 40 MG: 10 INJECTION, SOLUTION INTRAVENOUS at 16:36

## 2020-06-08 RX ADMIN — FENTANYL CITRATE 100 MCG: 50 INJECTION, SOLUTION INTRAMUSCULAR; INTRAVENOUS at 13:19

## 2020-06-08 RX ADMIN — ASPIRIN 81 MG CHEWABLE TABLET 81 MG: 81 TABLET CHEWABLE at 08:09

## 2020-06-08 RX ADMIN — MAGNESIUM SULFATE HEPTAHYDRATE 2 G: 40 INJECTION, SOLUTION INTRAVENOUS at 06:18

## 2020-06-08 RX ADMIN — FENTANYL CITRATE 100 MCG: 50 INJECTION, SOLUTION INTRAMUSCULAR; INTRAVENOUS at 15:14

## 2020-06-08 RX ADMIN — QUETIAPINE FUMARATE 25 MG: 25 TABLET ORAL at 14:25

## 2020-06-08 RX ADMIN — Medication 25 MG: at 08:09

## 2020-06-08 RX ADMIN — FENTANYL CITRATE 100 MCG: 50 INJECTION, SOLUTION INTRAMUSCULAR; INTRAVENOUS at 04:56

## 2020-06-08 RX ADMIN — LIDOCAINE HYDROCHLORIDE 1 ML: 10 INJECTION, SOLUTION EPIDURAL; INFILTRATION; INTRACAUDAL; PERINEURAL at 12:51

## 2020-06-08 RX ADMIN — Medication 40 MG: at 08:11

## 2020-06-08 RX ADMIN — FENTANYL CITRATE 100 MCG: 50 INJECTION, SOLUTION INTRAMUSCULAR; INTRAVENOUS at 21:56

## 2020-06-08 RX ADMIN — MELATONIN TAB 3 MG 10 MG: 3 TAB at 23:07

## 2020-06-08 RX ADMIN — VECURONIUM BROMIDE 10 MG: 1 INJECTION, POWDER, LYOPHILIZED, FOR SOLUTION INTRAVENOUS at 12:02

## 2020-06-08 RX ADMIN — MIDAZOLAM 2 MG: 1 INJECTION INTRAMUSCULAR; INTRAVENOUS at 12:01

## 2020-06-08 ASSESSMENT — ACTIVITIES OF DAILY LIVING (ADL)
ADLS_ACUITY_SCORE: 20

## 2020-06-08 ASSESSMENT — MIFFLIN-ST. JEOR: SCORE: 1789.75

## 2020-06-08 NOTE — PROGRESS NOTES
[facility] ID Service: Follow Up Note      Patient:  Scot Bishop   Date of birth 1980, Medical record number [id number]  Date of Visit:  06/08/2020  Date of Admission: 5/17/2020         Assessment and Recommendations:   ID Problem List:  1. Fever  2. Leukocytosis  3. Acinetobacter (not baumannii) on sputum culture   4. Blood culture with MRSE  5. Right groin fluid collection  6. Acute respiratory failure  7. STEMI with PEA/VF out of hospital arrest s/p NAZIA to LAD  8. Hypoxic brain injury  9. VA-ECMO (5/17-5/19)    Recommendations:  1. Stop Vancomycin  2. Continue Zosyn  3. Monitor fever curve  4. Repeat blood cultures for temp >100.4F        Discussion:  Scot Bishop is a 39 year old male with no significant known past medical history who presented to [facility] on 5/17/20 after a PEA/V Fib arrest and STEMI. Was taken to the cath lab emergently and had NAZIA placed to LAD, found to have LAD thrombotic occlusion s/p successful aspiration thrombectomy, was cannulated for VA ECMO (5/17-5/19).      Wound care RN and ENT have been following for tongue ulceration and split, though does not appear infected. Acute hypoxic respiratory failure and Acinetobacter (not baumanni) on sputum cultures (5/20, 5/22, 5/27), has been on Meropenem --> Unasyn without clearing. Fever to 101.5F on 5/22 and has continued to have intermittent fevers since, temperature to 102F on 5/28, has been persistently febrile >100.6 since 6/1 despite scheduled tylenol. Increase in fever curve not correlated with transition to Unasyn. Last chest CT was 5/17 and lungs with effusions c/w and atelectasis/consolidation in setting of recent arrest. CXR on 6/3 with increased LLL opacity, CT with some GGO though no consolidation. Sputum (6/2) growing Acinetobacter (not baumannii) and Candida albicans.     1/2 blood cultures 6/1 with MRSE. Started on vancomycin and awaiting results of follow up cultures. Most likely contamination as additional cultures  have not had any growth.    CT Chest/abd/pelvis with fluid collection in right groin with reactive lymphadenopathy. Subsequent US showed fluid collections and no evidence of pseudoaneurysm. Hematoma vs abscess though at this point favor hematoma.    Hematoma may be contributing to fevers. No clear evidence of pneumonia or additional source at this time. Central fevers considered given hypoxic ischemic brain injury. Has shown improvement in fever curve since 6/3, though not necessarily correlated with antibiotic changes (Unasyn --> Zosyn and addition of Vancomycin). Continue Zosyn, stop vancomycin today.       Don't hesitate to call with questions.     Attestation:  I have reviewed today's vital signs, medications, labs and imaging.  Emy Arana PA-C, Pager # [phone]        ID Staff Assessment and Plan:   Physician Attestation   I, Priyanka Stanley, saw and evaluated Scot Bishop as part of a shared visit.  I have reviewed and discussed with the advanced practice provider their history, physical and plan.    I personally reviewed the vital signs, medications, labs and imaging.    The note above reflects our joint assessment and plan, which I discussed with the PA in detail.   Priyanka Stnaley MD  ID Staff Physician  Pager [phone]        Interval History:     S/p trach at bedside this morning. Afebrile since 6/4. No additional changes.         Review of Systems:   Unable to obtain, intubated.          Current Antimicrobials   Current:  - Zosyn (start 6/3)  - Vancomycin (start 6/3)    Prior:  - Unasyn (5/29-6/3)  - Meropenem (5/22-5/29)  - Zosyn (5/17-5/22)  - Vancomycin (5/17-5/23)         Physical Exam:   Ranges for vital signs:  Temp:  [97.8  F (36.6  C)-98.5  F (36.9  C)] 97.8  F (36.6  C)  Heart Rate:  [] 102  Resp:  [11-12] 11  BP: ()/(59-79) 112/78  MAP:  [68 mmHg-85 mmHg] 81 mmHg  Arterial Line BP: ()/(53-70) 105/61  FiO2 (%):  [30 %] 30 %  SpO2:  [99 %-100 %] 100 %    Intake/Output  Summary (Last 24 hours) at 6/4/2020 1418  Last data filed at 6/4/2020 1400  Gross per 24 hour   Intake 3962 ml   Output 1987 ml   Net 1975 ml     Exam:  GENERAL:  well-developed, well-nourished.   HEENT:  Head is normocephalic, atraumatic   EYES:  Eyes have anicteric sclerae without conjunctival injection.    ENT:  Oropharynx is moist. S/p tracheostomy  LUNGS:  Clear to auscultation bilateral, +mechanical ventilation   CARDIOVASCULAR:  Regular rate and rhythm with no murmurs, gallops or rubs.  ABDOMEN:  Normal bowel sounds, soft, nontender.Rectal tube in place.  :R groin site without erythema or induration.  SKIN:  No acute rashes.  Line(s) are in place without any surrounding erythema or exudate.  NEUROLOGIC: Opens eyes spontaneously, moves extremities. Does not follow commands.         Laboratory Data:   Reviewed.  Pertinent for:    Culture data:  6/4/20 blood culture: NGTD  6/3/20 blood culture: NGTD  6/2/20 sputum culture: Acinetobacter species, not baumannii; light growth of Candida albicans  6/1/20 blood culture left arm: Staph epi positive for mec A gene  6/1/20 blood culture right hand: NGTD  6/1/20 C.diff toxin by PCR: negative  5/27/20 sputum culture: Acinetobacter species, not baumannii (intermediate: ceftazidime); light growth of Candida albicans  5/22/20 sputum culture: Acinetobacter species, not baumannii  5/21/20 sputum culture: Acinetobacter species, not baumannii  5/20/20 sputum culture: Acinetobacter species, not baumannii (intermediate ceftazidime, ceftriaxone); CoNS  5/19/20 sputum culture: MSSA    Negative blood cultures (5/17, 5/18, 5/19, 5/20, 5/22, 5/27, 5/28)    Inflammatory Markers    Recent Labs   Lab Test 05/20/20  0356 05/19/20  0415 05/18/20  0353 05/17/20  1615   SED 83* 33* 8 7   .0* 89.0* 16.0* <2.9       Hematology Studies    Recent Labs   Lab Test 06/08/20  0330 06/07/20  0333 06/06/20  0200 06/05/20  0910   WBC 9.7 10.4 12.3* 9.7   HGB 7.9* 8.2* 8.0* 9.1*    99 98  97    448 474* 561*     Recent Labs   Lab Test 06/02/20  1059 05/17/20  1615   ANEU 10.0* 20.9*   AEOS 0.6 0.1       Metabolic Studies     Recent Labs   Lab Test 06/08/20  0330 06/07/20  0333 06/06/20  0200 06/05/20  0910    142 143 142   POTASSIUM 4.0 3.8 3.5 3.6   CHLORIDE 110* 113* 111* 111*   CO2 26 26 26 24   BUN 27 27 29 30   CR 0.78 0.72 0.82 0.84   GFRESTIMATED >90 >90 >90 >90       Hepatic Studies    Recent Labs   Lab Test 06/04/20  0416 06/03/20  0527 06/02/20  0345   BILITOTAL 0.6 0.5 0.5   ALKPHOS 127 131 124   ALBUMIN 3.0* 3.0* 2.8*   AST 57* 63* 66*   * 123* 114*            Imaging:   CT CHEST/ABD/PELVIS W/O CONTRAST (6/3/20)  IMPRESSION: In this patient with history of persistent fever, the  current scan shows:   1. Hypodense collection measuring up to 5 cm in the right inguinal  region abutting the femoral vascular bundle with additional collection  anterior to this collection measuring up to 4.8 cm with fluid/fluid  level and dependent hyperdense contents; collections may represent  hematoma/abscess and/or pseudoaneurysm. Consider right inguinal  Doppler ultrasound for further evaluation.  2. No intra-abdominal or intrathoracic abscess or collection.  3. Patchy groundglass opacity in the right upper lobe may represent  atelectasis versus inflammatory change.  4. Support devices as described above.    US LOWER EXTREMITY ARTERIAL RIGHT (6/3/20)  Impression:   1. No evidence of pseudoaneurysm or arteriovenous fistula.  2. Slightly increased size of right groin fluid collections which may  represent hematomas.    CT HEAD W/O CONTRAST (6/3/20)  Impression:   1. No change in imaging features of diffuse hypoxic ischemic injury.  2. No evident new intracranial pathology.

## 2020-06-08 NOTE — PROGRESS NOTES
Cardiology Progress Note  Scot Bishop MRN: [id number]  Age: 39 year old, : 1980  Date: 2020            Assessment and Plan:     Scot Bishop is a 39 year old male who was admitted on 2020 for cardiac arrest. Pt reportedly had chest pain that started on 20. He was using Drano on his pipes at home and did not initially seek medical attention for CP and SOB since it said on the box that Drano can cause those symptoms. He took a shower and had syncopal episode after coming out of the shower. EMS was called; initial rhythm asystole. With chest compressions pt eventually had PEA and then eventually had VF in the field. After multiple cycles of epinephrine had ROSC. He was intubated in the field.   Pt was brought to [facility] ED where he regained a pulse and was brought to the Cath Lab for coronary angiogram. Initially, BP was 90s/60s as he was being transported from ED but he had recurrent cardiac arrest on arrival to Cath Lab. ECG showed ST elevation in aVR. He was promptly placed on VA ECMO. He had thrombotic occlusion of proximal LAD and underwent successful aspiration thrombectomy and PCI w/ NAZIA of proximal and mid LAD.     Interval events: Fentanyl 100 mcg/hr added on  for restlessness/agitation. Continues to tolerate pressure support. Afebrile.      Today's plan:   -discontinue fentanyl gtt   -add AM dose of seroquel   -use precedex as needed for agitation   -hold AM subcutaneous heparin   -trach/PEG this afternoon by SICU team     Neurology: Intubated. Initial CT head negative. Cooled to 34 degrees on arrival; rewarmed  AM. EEG  showed severe diffuse encephalopathy without seizures or epileptiform discharges.  Repeat CTH : multifocal acute/subacute cerebral infarcts   Brain MRI : diffuse acute/subacute infarcts in bilat cerebral hemispheres, corpus callosum, and periventricular white matter c/w evolving diffuse hypoxic ischemic brain injury    Episode of LUE posturing and rhythmic tremors overnight on 5/27. EEG restarted 5/28, now off.  CTH repeated 6/3 for lack of LLE movement - negative for new stroke or other acute changes.   -moving all extremities although nothing to command   -fentanyl gtt added 6/6 - discontinue after trach/PEG today   -seroquel 25 mg PM and PRN   -Neurocrit following; appreciate their recs    Cardiovascular / Hemodynamics: Refractory VF arrest    S/p NAZIA to proximal-mid LAD  Sinus tachycardia   Peripheral VA ECMO inserted for cardiac arrest. LA 16 initially. Suspect ongoing tachycardia d/t evolving MI and post-arrest state. ECMO decannulation at bedside on 5/19; IABP discontinued 5/20. 23 second run on VT overnight on 5/27.   TTE 5/26/20: EF 35-40%, RV normal   EKG 5/29: sinus tachycardia, QTc 467.   -continue ASA 81mg daily and ticagrelor 90mg BID  -continue metoprolol tartrate 25 mg BID   -add lisinopril 5 mg tomorrow   -hold statin for now given likely hepatic injury during arrest   Pulmonary: Acute hypoxic respiratory failure  COVID negative  Admission CT chest showed bilateral consolidations c/w aspiration PNA. Had thick secretions that required bagging by RT on 5/20. Bronchoscopy 5/21. Increasing oxygen requirements and pt-vent dyssynchrony on 5/22. Pulmonary re-consulted, infectious work up, pt sedated. Significant pressure injury of tongue w/ splitting of tongue noted by [facility] nurse today. No active bleeding.    Vent settings this AM: PS 7/5 30%  ABG 5/31: 7.48/34/116/26   CXR 6/3: mild pulmonary edema    -trach placement today at bedside   -ENT consult 5/27 for pressure injury of tongue; plan for follow up w/ them as outpatient for possible surgical repair of tongue    -has been tolerating long periods of pressure support   -mucomyst and albuterol nebs PRN   -growing GNR in sputum; unasyn d/c'd, added vanc/zosyn 6/4 per ID  -vanc d/c'd 6/7 per ID, continues on zosyn   -CXR as needed   -ABGs PRN    GI and Nutrition: Shock  liver  GIB, resolved   , AST 57 on 6/4. T bili 0.6.    -PEG placement today at bedside   -TF on hold since MN    -bowel regimen discontinued given loose stools  -C diff culture 6/1 negative    -GI Prophylaxis: Protonix BID for UGIB   Renal, Fluid and Electrolytes: Acute kidney injury, resolved    Cr improved to 0.78 this AM. 2.3L UOP and net even yesterday. Some urine unmeasured d/t condom cath. Na 142 today.   -FWF 60 mL q4hrs   -monitor I/O  -maintain K>3.8 and Mg>2    Infectious Disease: Aspiration pneumonia  Leukocytosis  WBC 12.1, tmax 99.4F. Grew GNR, acinetobacter (not baumannii) in sputum cx. Blood cx 6/1 grew GPCs, sputum cx 6/2 grew non-lactose fermenting GNR. ID consulted 6/3 for persistent fevers. Currently afebrile.   -vancomycin/zosyn x5 days (5/17-5/22) for asp PNA   -discontinued meropenem changed to unasyn 5/29  -unasyn discontinued and added vanc/zosyn 6/4  -discontinued vanc 6/7; continues on zosyn   -CT CAP negative for other source of infection   -scheduled acetaminophen changed to PRN   -C diff culture 6/1 negative      -monitor for signs of infection given lines and leukocytosis   Hematology and Oncology: Thrombocytosis, resolved    Plts 412 today. Receiving ASA/ticagrelor for NAZIA. Transfused 1 unit PRBCs 5/21. Hgb 7.9 today. No signs of active bleeding.    -right inguinal hematoma on US at old ECMO cannulae insertion site, stable   -peripheral smear showed normal platelet morphology   -daily CBC   -transfuse for Hgb<7   -DVT PPX: subcutaneous heparin - on hold this AM d/t plan for trach/PEG today    Endocrinology: No known medical history. BG elevated.  -not requiring insulin gtt  -HgbA1c 5.2   Lines:       R radial arterial line May 17, 2020  ETT May 17, 2020  OG tube May 17, 2020  NJ tube May 26, 2020   Restraint: mitts    Current lines are required for patient management       Family update by me today: Yes     Code Status: Full code     The pt was discussed and evaluated with   "Abigail, attending physician, who agrees with the assessment and plan above.     Kala Chiang, DNP APRN CNP          Objective     /72 (BP Location: Left arm)   Pulse 129   Temp 97.9  F (36.6  C) (Oral)   Resp 11   Ht 1.676 m (5' 6\")   Wt 93.7 kg (206 lb 9.1 oz)   SpO2 100%   BMI 33.34 kg/m    Temp:  [97.9  F (36.6  C)-98.9  F (37.2  C)] 97.9  F (36.6  C)  Heart Rate:  [81-99] 86  Resp:  [11-13] 11  BP: ()/(59-79) 101/72  MAP:  [67 mmHg-86 mmHg] 72 mmHg  Arterial Line BP: ()/(53-72) 106/56  FiO2 (%):  [30 %] 30 %  SpO2:  [99 %-100 %] 100 %  Wt Readings from Last 2 Encounters:   06/08/20 93.7 kg (206 lb 9.1 oz)     I/O last 3 completed shifts:  In: 3135 [I.V.:1145; NG/GT:670]  Out: 3145 [Urine:2295; Emesis/NG output:350; Stool:500]      GENERAL APPEARANCE: Intubated. NAD.  HEENT: No icterus, PERRL 3 mm, ETT in place, NJ tube in place  CARDIOVASCULAR: sinus rhythm, normal S1 and S2, no S3 or S4 and no murmur, click or rub. Normal PMI. Pulses dopplerable.  RESP: Clear bilaterally. Mechanical ventilation.    GASTRO: Soft, bowel sounds hyperactive.  GENITOURINARY: Condom cath in place.  EXTREMITIES: Warm, no edema.    NEURO: Intubated, pupils equal and reactive, 3 mm. Moving extremities and opening eyes although not following commands. Withdraws to pain.    INTEGUMENTARY: No rashes. Line sites CDI. Bilaterally groin sites CDI; right groin ecchymotic, incision intact with area of firmness on medial aspect of incision.  LINES/TUBES/DRAINS: R radial Arterial line. ETT. OG. NJ.          Data:     Vent Settings:  Resp: 11 SpO2: 100 % O2 Device: Mechanical Ventilator      Arterial Blood Gas:   Recent Labs   Lab 06/06/20  0228   PH 7.44   PCO2 35   PO2 154*   HCO3 24   O2PER 30.0       Vitals:    06/06/20 0400 06/07/20 0400 06/08/20 0400   Weight: 91.7 kg (202 lb 2.6 oz) 92 kg (202 lb 13.2 oz) 93.7 kg (206 lb 9.1 oz)   I/O last 3 completed shifts:  In: 3135 [I.V.:1145; NG/GT:670]  Out: 3145 " [Urine:2295; Emesis/NG output:350; Stool:500]  Recent Labs   Lab 20  0330 20  0333    142   POTASSIUM 4.0 3.8   CHLORIDE 110* 113*   CO2 26 26   ANIONGAP 5 3   GLC 94 112*   BUN 27 27   CR 0.78 0.72   TEN 8.7 8.6     No components found for: URINE   Recent Labs   Lab 20  0416 20  0527 20  0345   AST 57* 63* 66*   * 123* 114*   BILITOTAL 0.6 0.5 0.5   ALBUMIN 3.0* 3.0* 2.8*   PROTTOTAL 7.9 8.3 7.6   ALKPHOS 127 131 124     Temp: 97.9  F (36.6  C) Temp src: OralTemp  Min: 97.9  F (36.6  C)  Max: 98.9  F (37.2  C)   Recent Labs   Lab 20  0330 20  0333 20  0200 20  0910 20  0416   WBC 9.7 10.4 12.3* 9.7 12.1*   HGB 7.9* 8.2* 8.0* 9.1* 9.2*   HCT 26.2* 26.9* 26.0* 29.8* 29.8*    99 98 97 99   RDW 14.8 14.7 14.6 14.6 15.0    448 474* 561* 577*     Recent Labs   Lab 20  0330   INR 1.10     Recent Labs   Lab 20  0330 20  0333 20  0200 20  0910 20  0416   GLC 94 112* 112* 133* 118*       All imaging personally reviewed:  Recent Results (from the past 24 hour(s))   Echocardiogram Complete    Narrative    [id number]  [id number]  [id number]  [id number]^GRIS^ASRAR           [facility]  Echocardiography Laboratory  [address] [place] [address]     Name: FAINA FORRESTER  MRN: [id number]  : 1980  Study Date: 2020 08:16 AM  Age: 39 yrs  Gender: Male  Patient [facility]: [facility]  Reason For Study: Cardiac Arrest  Ordering Physician: YOSHI LANDRY  Performed By: Denisse Johnson RDCS     BSA: 2.1 m2  Height: 66 in  Weight: 231 lb  BP: 132/58 mmHg  _____________________________________________________________________________  __        Procedure  Complete Portable Echo Adult. Technically difficult study.Extremely poor  acoustic windows.  _____________________________________________________________________________  __        Interpretation Summary  VA ECMO at 3.7L/min.  Technically difficult study. Extremely poor acoustic  windows.  Severely (EF <30%) reduced left ventricular function on extremely limited  views.  The right ventricle cannot be assessed.  No pericardial effusion is present.  Previous study not available for comparison.  _____________________________________________________________________________  __        Left Ventricle  Left ventricular wall thickness cannot evaluate. Left ventricular size is  normal. Severely (EF <30%) reduced left ventricular function is present. Left  ventricular diastolic function is not assessable. Severe diffuse hypokinesis  is present.     Right Ventricle  The right ventricle cannot be assessed. Right ventricular function cannot be  assessed due to poor image quality.     Mitral Valve  The mitral valve cannot be assessed.        Aortic Valve  The aortic valve opens with each cardiac cycle. On Doppler interrogation,  there is no significant stenosis or regurgitation.     Tricuspid Valve  The tricuspid valve cannot be assessed. Pulmonary artery systolic pressure  cannot be assessed.     Pulmonic Valve  The pulmonic valve cannot be assessed.     Vessels  The aorta root cannot be assessed. The thoracic aorta cannot be assessed.  Venous ECMO cannula is visualized traversing the IVC.     Pericardium  No pericardial effusion is present.     Compared to Previous Study  Previous study not available for comparison.     _____________________________________________________________________________  __                       Report approved by: Ashlee Izquierdo 05/18/2020 09:18 AM                 _____________________________________________________________________________  __                Medications     Current Facility-Administered Medications   Medication     0.9% sodium chloride BOLUS     0.9% sodium chloride BOLUS     acetaminophen (TYLENOL) tablet 650 mg     acetylcysteine (MUCOMYST) 10 % nebulizer solution 4 mL     artificial tears  ophthalmic ointment     aspirin (ASA) chewable tablet 81 mg     calcium chloride in  mL intermittent infusion 1 g     chlorhexidine (PERIDEX) 0.12 % solution 15 mL     dextrose 10% infusion     glucose gel 15-30 g    Or     dextrose 50 % injection 25-50 mL    Or     glucagon injection 1 mg     fentaNYL (PF) (SUBLIMAZE) injection  mcg     fentaNYL (SUBLIMAZE) infusion     heparin ANTICOAGULANT injection 5,000 Units     heparin lock flush 10 UNIT/ML injection 5-10 mL     heparin lock flush 10 UNIT/ML injection 5-10 mL     ipratropium - albuterol 0.5 mg/2.5 mg/3 mL (DUONEB) neb solution 3 mL     levalbuterol (XOPENEX) neb solution 1.25 mg     levETIRAcetam (KEPPRA) solution 1,000 mg     lidocaine (LMX4) cream     lidocaine 1 % 0.1-1 mL     magnesium sulfate 2 g in water intermittent infusion     magnesium sulfate 4 g in 100 mL sterile water (premade)     melatonin tablet 10 mg     metoprolol tartrate (LOPRESSOR) half-tab 25 mg     midazolam (VERSED) injection 1-2 mg     multivitamins w/minerals (CERTAVITE) liquid 15 mL     naloxone (NARCAN) injection 0.1-0.4 mg     oxyCODONE (ROXICODONE) tablet 5 mg     pantoprazole (PROTONIX) 2 mg/mL suspension 40 mg     piperacillin-tazobactam (ZOSYN) 4.5 g vial to attach to  mL bag     potassium chloride (KLOR-CON) Packet 20-40 mEq     potassium chloride 10 mEq in 100 mL intermittent infusion with 10 mg lidocaine     potassium chloride 10 mEq in 100 mL sterile water intermittent infusion (premix)     potassium chloride 20 mEq in 50 mL intermittent infusion     potassium chloride ER (KLOR-CON M) CR tablet 20-40 mEq     QUEtiapine (SEROquel) tablet 25 mg     QUEtiapine (SEROquel) tablet 25 mg     sodium chloride (NEBUSAL) 3 % neb solution 3 mL     sodium chloride (PF) 0.9% PF flush 10-20 mL     sodium chloride (PF) 0.9% PF flush 3 mL     sodium chloride (PF) 0.9% PF flush 3 mL     sodium phosphate 10 mmol in D5W intermittent infusion     sodium phosphate 15 mmol in  D5W intermittent infusion     sodium phosphate 20 mmol in D5W intermittent infusion     sodium phosphate 25 mmol in D5W intermittent infusion     ticagrelor (BRILINTA) tablet 90 mg                      Critical Care Services Progress Note:     Scot Bsihop remains critically ill with acute coronary syndrome, mental status changes and shock after an out of hospital cardiac arrest.      I personally examined and evaluated the patient today.   The patient s prognosis today is tentative  I have evaluated all laboratory values and imaging studies from the past 24 hours.  Key findings and decisions made today included   -discontinue fentanyl gtt   -add AM dose of seroquel   -use precedex as needed for agitation   -hold AM subcutaneous heparin   -trach/PEG this afternoon by SICU team   I personally managed the ventilator, sedation, pain control and analgesia, metabolic abnormalities, antibiotic therapy, nutritional status and vasoactive medications.   Consults ongoing and ordered are Surgery  Procedures that will happen today are: Trach /Peg this afternoon  All treatments were placed at my direction.  I formulated today s plan with the house staff team or resident(s) and agree with the findings and plan in the associated note.       The above plans and care have been discussed with none and all questions and concerns were addressed.     I spent a total of 45 minutes (excluding procedure time) personally providing and directing critical care services at the bedside and on the critical care unit for Scot Bishop.        Grant Hayes MD

## 2020-06-08 NOTE — PLAN OF CARE
OT/4C - Discharge Planner OT   Patient plan for discharge: not able to state  Current status: Pt not following commands during session. Spontaneous movements observed, but no purposeful movements. Pt tolerates PROM to BUE/BLE x10 reps to promote joint and muscle integrity. VSS while pt on CPAP FiO2 30%, 7 Psupp, spO2 100%, RR 11, HR 89 bpm, MAP 80 via A-line.  Barriers to return to prior living situation: medical status  Recommendations for discharge: LTACH  Rationale for recommendations: pt would benefit form continued skilled therapy to maximize ADL independence       Entered by: Elizabeth Soni 06/08/2020 9:07 AM

## 2020-06-08 NOTE — PROCEDURES
Mr. Bishop is a 39 year old male admitted 5/17 for refractory V-fib cardiac arrest with ROSC in ED 2/2 to CAD/LAD thrombosis s/p PCI/stent. Underwent course of VA-ECMO and therapeutic hypothermia s/p successful decannulation 5/19.     He was placed in a supine position and prepped/ draped in the typical fashion. A time out was performed and patient was verified by name and date of birth. A ~2 cm vertical incision was made inferior to the cricothyroid membrane and blunt dissection was used to access the trachea. An angio cath was used to puncture the trachea and placement observed by bronchoscopy. The tracheostomy was dilated and a 6 Shiley tracheostomy tube was placed. It was secured with 2-0 prolene. There were no immediate complications. Post-operative CXR ordered.    Dr. Prasad was available throughout the entirety of the procedure.    Milo Washburn MD

## 2020-06-08 NOTE — CONSULTS
Consult Note:    Mr. Bishop is a 39 year old male admitted 5/17 for refractory V-fib cardiac arrest with ROSC in ED 2/2 to CAD/LAD thrombosis s/p PCI/stent  Underwent course of VA-ECMO and therapeutic hypothermia s/p successful decannulation 5/19  Managed in MICU for:  Severe diffuse encephalopathy  Refractory VF arrest/CAD s/p stent   Shock liver/GIB (resolved)  LOWELL  Aspiration pneumonia  Thrombocytosis    SICU team consulted for trach/PEG placement. Patient was seen at bedside and is non-verbal. Chart reviewed.     Current Meds: Heparin subcutaneous q 8h, asa 81 mg, Brilinta - 90 mg BID, Zosyn, pantoprazole.     On examination:  Temp: 98  F (36.7  C) Temp src: Axillary BP: 112/78   Heart Rate: 100 Resp: 11 SpO2: 100 % O2 Device: Mechanical Ventilator     RASS +2  Does not make eye contact.  Neck supple, no masses, surgical scars or lesions  Chest good air entry bilaterally  Abdomen soft, non-tender, non-distended, bowel sounds present, no surgical scars or hernias  Warm peripheries    Labs: reviewed  INR 1.10  Plt 412  H/H 7.9/26.2  COVID negative - 5/17    Sputum Cx 6/2/20: Acinetobacter/Candida  Blood cx 6/1/20: MRSE    CT Chest 6/3/20: In this patient with history of persistent fever, the current scan shows:   1. Hypodense collection measuring up to 5 cm in the right inguinal region abutting the femoral vascular bundle with additional collection anterior to this collection measuring up to 4.8 cm with fluid/fluid level and dependent hyperdense contents; collections may represent hematoma/abscess and/or pseudoaneurysm. Consider right inguinal Doppler ultrasound for further evaluation.  2. No intra-abdominal or intrathoracic abscess or collection.  3. Patchy groundglass opacity in the right upper lobe may represent  atelectasis versus inflammatory change.  4. Support devices as described above.    Assessment:  39 year old male with V-fib arrest 2/2 thrombotic occlusion of LAD - prolonged hospital course.      Plan:  Will plan for trach/PEG at bedside today with Dr. Prasad  Consent to be obtained over telephone  Keep NPO.  Will order vecuronium/fentanyl/ativan for sedation/analgesia.   Hold PM heparin

## 2020-06-08 NOTE — PROGRESS NOTES
"Bronchoscopy Risk Assessment Guidelines      A. Patient symptoms to consider when assessing pulmonary TB risk are:    I. Cough greater than 3 weeks; and fever, hemoptysis, pleuritic chest    pain, weight loss greater than 10 lbs, night sweats, fatigue, infiltrates on    upper lobes or superior segments of lower lobes, cavitation on chest    x-ray.   B. Patient risk factors to consider when assessing pulmonary TB risk are:    I. Exposure to known TB case, foreign-born persons (within 5 years of    arrival to [place]), residence in a crowded setting (correctional facility,     long-term care center, etc.), persons with HIV or immunosuppression.    Patients with symptoms and risk factors should generally be considered \"suspect risk\" and bronchoscopies should be performed in airborne precautions.    This patient has NO KNOWN RISK of Tuberculosis (proceed with bronchoscopy)    Specimens sent: no  Complications: Bleeding  Scope used: #[id number] Slim  Attending Physician: Arsalan Talamantes, RT on 6/8/2020 at 1:27 PM  "

## 2020-06-08 NOTE — PROGRESS NOTES
Brief ENT Progress Note    After discussion with primary team, ENT will defer any surgical or procedural management of Mr. Bishop's tongue wound. He is scheduled for trach/PEG today. His tongue will likely heal in a forked shape, and would require a surgery for debridement and suturing for definitive repair. Since he will likely not be eating or speaking we will hold off on any additional surgery. He may follow up in ENT clinic in the future if he and his family elect they would like repair.    I have called primary team and they were in agreement.    Please call ENT if any further concerns    Storm Kimball MD PGY1  [facility]

## 2020-06-08 NOTE — PLAN OF CARE
ICU End of Shift Summary. See flowsheets for vital signs and detailed assessment.    Changes this shift: Trach placed at bedside today by SICU team; patient tolerated well without acute event. No changes to neurologic status; patient opens eyes spontaneously but without tracking, does not follow commands, does not exhibit purposeful behavior beyond guarding. Increasingly restless 2/t discontinuation of fentanyl drip; PRN fentanyl bumps used with regularity.    Plan: Anticipate PEG placement tomorrow in IR. Continue to monitor patient status; notify physician of changes.

## 2020-06-08 NOTE — CONSULTS
A collaboration between [facility] and [facility]  Experts in minimally invasive, targeted treatments performed using imaging guidance    Interventional Radiology Consult Service Note    Patient Name:  Scot Bishop   YOB: 1980  Medical Record Number (MRN):  [id number]  Age:  39 year old male    -----    Reason for Consult  PEG placement      Ordered By    6/8/2020  1:39 PM  Kala Chiang APRN CNP - [id number]  Call back #  #[phone]    Invasive, Cardiologist, MD  Attending      Is the patient on an anticoagulant ?  Yes    Specify  ASA and ticagrelor for recent cardiac stents      INR - 1.10  Plts - 412    copied: [Mr. Bishop is a 39 year old male admitted 5/17 for refractory V-fib cardiac arrest with ROSC in ED 2/2 to CAD/LAD thrombosis s/p PCI/stent  Underwent course of VA-ECMO and therapeutic hypothermia s/p successful decannulation 5/19  Managed in MICU for:  Severe diffuse encephalopathy  Refractory VF arrest/CAD s/p stent   Shock liver/GIB (resolved)  LOWELL  Aspiration pneumonia  Thrombocytosis]    Consented obtainable from father Gerald Bishop.    Hoping to d/c to LTAC by Wed 6/10. SICU team unable to place PEG today, but maybe on Wednesday per NP Mariia. Pt has NJ in place now.    CT dated 6/3/20 shows window for perc G tube placement.        -----    PLAN:     Request, patient data, and imaging reviewed.     Will appt to IR schedule for tomorrow Tue 6/9.    [phone] (IR RN control desk)  [phone] ([place] IR call pager)    WILMER Rain, PA-C  Physician Assistant - Certified  Interventional Radiology

## 2020-06-08 NOTE — PLAN OF CARE
Major Shift Events: calmer during the shift . Restless intermittently . Continued not following commands and not tracking . Hemodynamically stable . Tolerated PS 7/5/30% .  Condom cath replaced x3 during the shift . NPO since midnight .    Plan: PEG/trach placement today .   For vital signs and complete assessments, please see documentation flowsheets.      Problem: Airway Clearance Ineffective  Goal: Effective Airway Clearance  6/8/2020 0652 by Rika Escalante, RN  Outcome: No Change     Problem: Adult Inpatient Plan of Care  Goal: Plan of Care Review  6/8/2020 0652 by Rika Escalante, RN  Outcome: No Change     Problem: Adult Inpatient Plan of Care  Goal: Optimal Comfort and Wellbeing  6/8/2020 0652 by Rika Escalante, RN  Outcome: No Change  6/7/2020 1827 by Ravi Carlton, RN  Outcome: No Change

## 2020-06-09 ENCOUNTER — APPOINTMENT (OUTPATIENT)
Dept: INTERVENTIONAL RADIOLOGY/VASCULAR | Facility: CLINIC | Age: 40
End: 2020-06-09
Attending: PHYSICIAN ASSISTANT
Payer: MEDICAID

## 2020-06-09 LAB
ALBUMIN SERPL-MCNC: 2.9 G/DL (ref 3.4–5)
ALP SERPL-CCNC: 148 U/L (ref 40–150)
ALT SERPL W P-5'-P-CCNC: 112 U/L (ref 0–70)
ANION GAP SERPL CALCULATED.3IONS-SCNC: 4 MMOL/L (ref 3–14)
ANION GAP SERPL CALCULATED.3IONS-SCNC: NORMAL MMOL/L (ref 6–17)
AST SERPL W P-5'-P-CCNC: 52 U/L (ref 0–45)
BACTERIA SPEC CULT: NO GROWTH
BILIRUB SERPL-MCNC: 0.5 MG/DL (ref 0.2–1.3)
BUN SERPL-MCNC: 20 MG/DL (ref 7–30)
BUN SERPL-MCNC: NORMAL MG/DL (ref 7–30)
CALCIUM SERPL-MCNC: 8.8 MG/DL (ref 8.5–10.1)
CALCIUM SERPL-MCNC: NORMAL MG/DL (ref 8.5–10.1)
CHLORIDE SERPL-SCNC: 103 MMOL/L (ref 94–109)
CHLORIDE SERPL-SCNC: NORMAL MMOL/L (ref 94–109)
CO2 SERPL-SCNC: 28 MMOL/L (ref 20–32)
CO2 SERPL-SCNC: NORMAL MMOL/L (ref 20–32)
CREAT SERPL-MCNC: 0.91 MG/DL (ref 0.66–1.25)
CREAT SERPL-MCNC: NORMAL MG/DL (ref 0.66–1.25)
ERYTHROCYTE [DISTWIDTH] IN BLOOD BY AUTOMATED COUNT: 14.6 % (ref 10–15)
GFR SERPL CREATININE-BSD FRML MDRD: >90 ML/MIN/{1.73_M2}
GFR SERPL CREATININE-BSD FRML MDRD: NORMAL ML/MIN/{1.73_M2}
GLUCOSE BLDC GLUCOMTR-MCNC: 113 MG/DL (ref 70–99)
GLUCOSE BLDC GLUCOMTR-MCNC: 114 MG/DL (ref 70–99)
GLUCOSE BLDC GLUCOMTR-MCNC: 118 MG/DL (ref 70–99)
GLUCOSE SERPL-MCNC: 127 MG/DL (ref 70–99)
GLUCOSE SERPL-MCNC: NORMAL MG/DL (ref 70–99)
HCT VFR BLD AUTO: 26.4 % (ref 40–53)
HGB BLD-MCNC: 7.6 G/DL (ref 13.3–17.7)
MAGNESIUM SERPL-MCNC: 1.9 MG/DL (ref 1.6–2.3)
MAGNESIUM SERPL-MCNC: NORMAL MG/DL (ref 1.6–2.3)
MCH RBC QN AUTO: 29.7 PG (ref 26.5–33)
MCHC RBC AUTO-ENTMCNC: 28.8 G/DL (ref 31.5–36.5)
MCV RBC AUTO: 103 FL (ref 78–100)
PLATELET # BLD AUTO: 337 10E9/L (ref 150–450)
POTASSIUM SERPL-SCNC: 3.6 MMOL/L (ref 3.4–5.3)
POTASSIUM SERPL-SCNC: NORMAL MMOL/L (ref 3.4–5.3)
PROT SERPL-MCNC: 7.6 G/DL (ref 6.8–8.8)
RBC # BLD AUTO: 2.56 10E12/L (ref 4.4–5.9)
SODIUM SERPL-SCNC: 135 MMOL/L (ref 133–144)
SODIUM SERPL-SCNC: NORMAL MMOL/L (ref 133–144)
SPECIMEN SOURCE: NORMAL
WBC # BLD AUTO: 10.8 10E9/L (ref 4–11)

## 2020-06-09 PROCEDURE — 25000132 ZZH RX MED GY IP 250 OP 250 PS 637: Performed by: STUDENT IN AN ORGANIZED HEALTH CARE EDUCATION/TRAINING PROGRAM

## 2020-06-09 PROCEDURE — 25000128 H RX IP 250 OP 636: Performed by: STUDENT IN AN ORGANIZED HEALTH CARE EDUCATION/TRAINING PROGRAM

## 2020-06-09 PROCEDURE — 27210437 ZZH NUTRITION PRODUCT SEMIELEM INTERMED LITER

## 2020-06-09 PROCEDURE — 85027 COMPLETE CBC AUTOMATED: CPT | Performed by: STUDENT IN AN ORGANIZED HEALTH CARE EDUCATION/TRAINING PROGRAM

## 2020-06-09 PROCEDURE — 20000004 ZZH R&B ICU UMMC

## 2020-06-09 PROCEDURE — 85027 COMPLETE CBC AUTOMATED: CPT | Performed by: NURSE PRACTITIONER

## 2020-06-09 PROCEDURE — 27210814 ZZ H TUBE GASTRO CR12

## 2020-06-09 PROCEDURE — 25500064 ZZH RX 255 OP 636

## 2020-06-09 PROCEDURE — 27210732 ZZH ACCESSORY CR1

## 2020-06-09 PROCEDURE — 40000275 ZZH STATISTIC RCP TIME EA 10 MIN

## 2020-06-09 PROCEDURE — 25000132 ZZH RX MED GY IP 250 OP 250 PS 637

## 2020-06-09 PROCEDURE — 40000281 ZZH STATISTIC TRANSPORT TIME EA 15 MIN

## 2020-06-09 PROCEDURE — 00000146 ZZHCL STATISTIC GLUCOSE BY METER IP

## 2020-06-09 PROCEDURE — 40000014 ZZH STATISTIC ARTERIAL MONITORING DAILY

## 2020-06-09 PROCEDURE — 49440 PLACE GASTROSTOMY TUBE PERC: CPT

## 2020-06-09 PROCEDURE — 83735 ASSAY OF MAGNESIUM: CPT | Performed by: STUDENT IN AN ORGANIZED HEALTH CARE EDUCATION/TRAINING PROGRAM

## 2020-06-09 PROCEDURE — 25000132 ZZH RX MED GY IP 250 OP 250 PS 637: Performed by: INTERNAL MEDICINE

## 2020-06-09 PROCEDURE — 83735 ASSAY OF MAGNESIUM: CPT | Performed by: NURSE PRACTITIONER

## 2020-06-09 PROCEDURE — 25000132 ZZH RX MED GY IP 250 OP 250 PS 637: Performed by: NURSE PRACTITIONER

## 2020-06-09 PROCEDURE — 80053 COMPREHEN METABOLIC PANEL: CPT | Performed by: NURSE PRACTITIONER

## 2020-06-09 PROCEDURE — 25800025 ZZH RX 258: Performed by: NURSE PRACTITIONER

## 2020-06-09 PROCEDURE — C1769 GUIDE WIRE: HCPCS

## 2020-06-09 PROCEDURE — 99291 CRITICAL CARE FIRST HOUR: CPT | Performed by: INTERNAL MEDICINE

## 2020-06-09 PROCEDURE — 99152 MOD SED SAME PHYS/QHP 5/>YRS: CPT

## 2020-06-09 PROCEDURE — 25000128 H RX IP 250 OP 636: Performed by: NURSE PRACTITIONER

## 2020-06-09 PROCEDURE — 94003 VENT MGMT INPAT SUBQ DAY: CPT

## 2020-06-09 PROCEDURE — 27210738 ZZH ACCESSORY CR2

## 2020-06-09 PROCEDURE — 25000125 ZZHC RX 250: Performed by: PHYSICIAN ASSISTANT

## 2020-06-09 PROCEDURE — 25000128 H RX IP 250 OP 636: Performed by: PHYSICIAN ASSISTANT

## 2020-06-09 PROCEDURE — 99233 SBSQ HOSP IP/OBS HIGH 50: CPT | Performed by: NURSE PRACTITIONER

## 2020-06-09 PROCEDURE — 25000125 ZZHC RX 250: Performed by: NURSE PRACTITIONER

## 2020-06-09 PROCEDURE — 27210886 ZZH ACCESSORY CR5

## 2020-06-09 PROCEDURE — 0DH63UZ INSERTION OF FEEDING DEVICE INTO STOMACH, PERCUTANEOUS APPROACH: ICD-10-PCS | Performed by: RADIOLOGY

## 2020-06-09 RX ORDER — FENTANYL CITRATE 50 UG/ML
50 INJECTION, SOLUTION INTRAMUSCULAR; INTRAVENOUS
Status: DISCONTINUED | OUTPATIENT
Start: 2020-06-09 | End: 2020-06-09

## 2020-06-09 RX ORDER — QUETIAPINE FUMARATE 50 MG/1
50 TABLET, FILM COATED ORAL AT BEDTIME
Status: DISCONTINUED | OUTPATIENT
Start: 2020-06-09 | End: 2020-06-18

## 2020-06-09 RX ORDER — FENTANYL CITRATE 50 UG/ML
25-50 INJECTION, SOLUTION INTRAMUSCULAR; INTRAVENOUS EVERY 5 MIN PRN
Status: DISCONTINUED | OUTPATIENT
Start: 2020-06-09 | End: 2020-06-09 | Stop reason: HOSPADM

## 2020-06-09 RX ORDER — AMOXICILLIN 250 MG
1 CAPSULE ORAL DAILY PRN
Status: DISCONTINUED | OUTPATIENT
Start: 2020-06-09 | End: 2020-06-18

## 2020-06-09 RX ORDER — LIDOCAINE HYDROCHLORIDE 20 MG/ML
JELLY TOPICAL ONCE
Status: DISCONTINUED | OUTPATIENT
Start: 2020-06-09 | End: 2020-06-13 | Stop reason: CLARIF

## 2020-06-09 RX ORDER — OXYCODONE HYDROCHLORIDE 5 MG/1
5 TABLET ORAL EVERY 6 HOURS
Status: DISCONTINUED | OUTPATIENT
Start: 2020-06-09 | End: 2020-06-11

## 2020-06-09 RX ORDER — OXYCODONE HCL 10 MG/1
10 TABLET, FILM COATED, EXTENDED RELEASE ORAL EVERY 12 HOURS
Status: DISCONTINUED | OUTPATIENT
Start: 2020-06-09 | End: 2020-06-09

## 2020-06-09 RX ORDER — FLUMAZENIL 0.1 MG/ML
0.2 INJECTION, SOLUTION INTRAVENOUS
Status: DISCONTINUED | OUTPATIENT
Start: 2020-06-09 | End: 2020-06-09 | Stop reason: HOSPADM

## 2020-06-09 RX ORDER — CEFAZOLIN SODIUM 2 G/100ML
2 INJECTION, SOLUTION INTRAVENOUS
Status: DISCONTINUED | OUTPATIENT
Start: 2020-06-09 | End: 2020-06-09 | Stop reason: HOSPADM

## 2020-06-09 RX ORDER — NALOXONE HYDROCHLORIDE 0.4 MG/ML
.1-.4 INJECTION, SOLUTION INTRAMUSCULAR; INTRAVENOUS; SUBCUTANEOUS
Status: DISCONTINUED | OUTPATIENT
Start: 2020-06-09 | End: 2020-06-09 | Stop reason: HOSPADM

## 2020-06-09 RX ORDER — IODIXANOL 320 MG/ML
50 INJECTION, SOLUTION INTRAVASCULAR ONCE
Status: COMPLETED | OUTPATIENT
Start: 2020-06-09 | End: 2020-06-09

## 2020-06-09 RX ORDER — QUETIAPINE FUMARATE 25 MG/1
25 TABLET, FILM COATED ORAL DAILY
Status: DISCONTINUED | OUTPATIENT
Start: 2020-06-09 | End: 2020-06-18

## 2020-06-09 RX ADMIN — QUETIAPINE FUMARATE 50 MG: 50 TABLET ORAL at 21:02

## 2020-06-09 RX ADMIN — Medication 25 MG: at 20:57

## 2020-06-09 RX ADMIN — HEPARIN SODIUM 5000 UNITS: 5000 INJECTION, SOLUTION INTRAVENOUS; SUBCUTANEOUS at 16:59

## 2020-06-09 RX ADMIN — Medication 13 ML: at 20:57

## 2020-06-09 RX ADMIN — MULTIVITAMIN 15 ML: LIQUID ORAL at 08:57

## 2020-06-09 RX ADMIN — FENTANYL CITRATE 300 MCG: 50 INJECTION, SOLUTION INTRAMUSCULAR; INTRAVENOUS at 12:42

## 2020-06-09 RX ADMIN — LIDOCAINE HYDROCHLORIDE 10 ML: 10 INJECTION, SOLUTION EPIDURAL; INFILTRATION; INTRACAUDAL; PERINEURAL at 12:50

## 2020-06-09 RX ADMIN — PIPERACILLIN AND TAZOBACTAM 4.5 G: 4; .5 INJECTION, POWDER, FOR SOLUTION INTRAVENOUS at 16:59

## 2020-06-09 RX ADMIN — OXYCODONE HYDROCHLORIDE 5 MG: 5 TABLET ORAL at 09:03

## 2020-06-09 RX ADMIN — IODIXANOL 10 ML: 320 INJECTION, SOLUTION INTRAVASCULAR at 13:06

## 2020-06-09 RX ADMIN — MIDAZOLAM 1 MG: 1 INJECTION INTRAMUSCULAR; INTRAVENOUS at 13:05

## 2020-06-09 RX ADMIN — QUETIAPINE FUMARATE 25 MG: 25 TABLET ORAL at 08:57

## 2020-06-09 RX ADMIN — ASPIRIN 81 MG CHEWABLE TABLET 81 MG: 81 TABLET CHEWABLE at 09:38

## 2020-06-09 RX ADMIN — FENTANYL CITRATE 100 MCG: 50 INJECTION, SOLUTION INTRAMUSCULAR; INTRAVENOUS at 02:18

## 2020-06-09 RX ADMIN — LEVETIRACETAM 1000 MG: 100 SOLUTION ORAL at 20:57

## 2020-06-09 RX ADMIN — Medication 40 MG: at 20:57

## 2020-06-09 RX ADMIN — TICAGRELOR 90 MG: 90 TABLET ORAL at 20:57

## 2020-06-09 RX ADMIN — Medication 40 MG: at 09:00

## 2020-06-09 RX ADMIN — PIPERACILLIN AND TAZOBACTAM 4.5 G: 4; .5 INJECTION, POWDER, FOR SOLUTION INTRAVENOUS at 21:02

## 2020-06-09 RX ADMIN — Medication 13 ML: at 16:46

## 2020-06-09 RX ADMIN — GLUCAGON HYDROCHLORIDE 1 MG: 1 INJECTION, POWDER, FOR SOLUTION INTRAMUSCULAR; INTRAVENOUS; SUBCUTANEOUS at 12:43

## 2020-06-09 RX ADMIN — CHLORHEXIDINE GLUCONATE 0.12% ORAL RINSE 15 ML: 1.2 LIQUID ORAL at 16:45

## 2020-06-09 RX ADMIN — FENTANYL CITRATE 50 MCG: 50 INJECTION, SOLUTION INTRAMUSCULAR; INTRAVENOUS at 13:05

## 2020-06-09 RX ADMIN — LEVETIRACETAM 1000 MG: 100 SOLUTION ORAL at 09:01

## 2020-06-09 RX ADMIN — OXYCODONE HYDROCHLORIDE 5 MG: 5 TABLET ORAL at 02:18

## 2020-06-09 RX ADMIN — DEXTROSE MONOHYDRATE 1000 ML: 100 INJECTION, SOLUTION INTRAVENOUS at 07:37

## 2020-06-09 RX ADMIN — CHLORHEXIDINE GLUCONATE 0.12% ORAL RINSE 15 ML: 1.2 LIQUID ORAL at 08:57

## 2020-06-09 RX ADMIN — DEXTROSE MONOHYDRATE 1000 ML: 100 INJECTION, SOLUTION INTRAVENOUS at 01:12

## 2020-06-09 RX ADMIN — OXYCODONE HYDROCHLORIDE 5 MG: 5 TABLET ORAL at 16:59

## 2020-06-09 RX ADMIN — Medication 25 MG: at 08:57

## 2020-06-09 RX ADMIN — PIPERACILLIN AND TAZOBACTAM 4.5 G: 4; .5 INJECTION, POWDER, FOR SOLUTION INTRAVENOUS at 11:27

## 2020-06-09 RX ADMIN — MIDAZOLAM 6 MG: 1 INJECTION INTRAMUSCULAR; INTRAVENOUS at 12:43

## 2020-06-09 RX ADMIN — CHLORHEXIDINE GLUCONATE 0.12% ORAL RINSE 15 ML: 1.2 LIQUID ORAL at 20:57

## 2020-06-09 RX ADMIN — FENTANYL CITRATE 100 MCG: 50 INJECTION, SOLUTION INTRAMUSCULAR; INTRAVENOUS at 05:47

## 2020-06-09 RX ADMIN — PIPERACILLIN AND TAZOBACTAM 4.5 G: 4; .5 INJECTION, POWDER, FOR SOLUTION INTRAVENOUS at 04:01

## 2020-06-09 RX ADMIN — Medication 220 MG: at 16:45

## 2020-06-09 RX ADMIN — OXYCODONE HYDROCHLORIDE 5 MG: 5 TABLET ORAL at 21:02

## 2020-06-09 RX ADMIN — TICAGRELOR 90 MG: 90 TABLET ORAL at 09:44

## 2020-06-09 ASSESSMENT — ACTIVITIES OF DAILY LIVING (ADL)
ADLS_ACUITY_SCORE: 20
ADLS_ACUITY_SCORE: 18

## 2020-06-09 ASSESSMENT — MIFFLIN-ST. JEOR: SCORE: 1793.75

## 2020-06-09 NOTE — PLAN OF CARE
ICU End of Shift Summary. See flowsheets for vital signs and detailed assessment.    Changes this shift: Pt continues to be restless and agitated overnight. Does not follow commands. Pressure supporting overnight. Became bradypneic at 0530, MD notified and vent settings switched to CMV.  Fentanyl, oxycodone and melatonin administered for restlessness and pain indicators. Trach with moderate amounts of blood. Minimal clear secretions.     Plan: Continue to monitor and assess pt condition. Notify MD of any changes.

## 2020-06-09 NOTE — PROGRESS NOTES
Patient Name: Scot Bishop  Medical Record Number: [id number]  Today's Date: 6/9/2020    Procedure: Percutaneous Gastrostomy Tube Placement  Proceduralist: Angelia Velázquez    Procedure Start: 1230  Procedure end: 1248  Sedation medications administered: Fentanyl 350mcg, Versed 7mg    Report given to: Laxmi CHENEY RN    D: Pt here via cart accompanied by transport, Sukhiat-Float RN, and RT.  Pt very restless upon arrival.  Pt had to be sedated in order to safely transfer him to our procedure table.  I: Monitored and sedated pt during procedure.  A: Pt tolerated the procedure well, he remained restless intermittently throughout the procedure.  VSS throughout.  Insertion site moderately bleeding upon completion of procedure.  Pressure held and then Quick-Clot used and bleeding subsided. MD's aware.  ICU RN aware.  P: Pt care to continue on 4C.

## 2020-06-09 NOTE — PROGRESS NOTES
[facility] ID Service: Follow Up Note      Patient:  Scot Bishop   Date of birth 1980, Medical record number [id number]  Date of Visit:  06/09/2020  Date of Admission: 5/17/2020         Assessment and Recommendations:   ID Problem List:  1. Fever  2. Leukocytosis  3. Acinetobacter (not baumannii) on sputum culture   4. Blood culture with MRSE  5. Right groin fluid collection  6. Acute respiratory failure  7. STEMI with PEA/VF out of hospital arrest s/p NAZIA to LAD  8. Hypoxic brain injury  9. VA-ECMO (5/17-5/19)    Recommendations:  1. Continue Zosyn, will considering stopping in 24-48 hours if remains stable  2. Monitor fever curve  3. Repeat blood cultures for temp >100.4F        Discussion:  Scot Bishop is a 39 year old male with no significant known past medical history who presented to [facility] on 5/17/20 after a PEA/V Fib arrest and STEMI. Was taken to the cath lab emergently and had NAZIA placed to LAD, found to have LAD thrombotic occlusion s/p successful aspiration thrombectomy, was cannulated for VA ECMO (5/17-5/19).      Wound care RN and ENT have been following for tongue ulceration and split, though does not appear infected. Acute hypoxic respiratory failure and Acinetobacter (not baumanni) on sputum cultures (5/20, 5/22, 5/27), has been on Meropenem --> Unasyn without clearing. Fever to 101.5F on 5/22 and has continued to have intermittent fevers since, temperature to 102F on 5/28, has been persistently febrile >100.6 since 6/1 despite scheduled tylenol. Increase in fever curve not correlated with transition to Unasyn. Last chest CT was 5/17 and lungs with effusions c/w and atelectasis/consolidation in setting of recent arrest. CXR on 6/3 with increased LLL opacity, CT with some GGO though no consolidation. Sputum (6/2) growing Acinetobacter (not baumannii) and Candida albicans.     1/2 blood cultures 6/1 with MRSE. Started on vancomycin and awaiting results of follow up cultures. Most  likely contamination as additional cultures have not had any growth.    CT Chest/abd/pelvis with fluid collection in right groin with reactive lymphadenopathy. Subsequent US showed fluid collections and no evidence of pseudoaneurysm. Hematoma vs abscess though at this point favor hematoma.    Hematoma may be contributing to fevers. No clear evidence of pneumonia or additional source at this time. Central fevers considered given hypoxic ischemic brain injury. Has shown improvement in fever curve since 6/3, though not necessarily correlated with antibiotic changes (Unasyn --> Zosyn and addition of Vancomycin). Continue Zosyn. Monitor off of vancomycin.       Don't hesitate to call with questions.     Attestation:  I have reviewed today's vital signs, medications, labs and imaging.  Emy Arana PA-C, Pager # [phone]              Interval History:     Plan for PEG tube today. No acute changes. Remains restless but less head thrashing than previous. Dried blood at trach site.         Review of Systems:   Unable to obtain, not responsive to questions.          Current Antimicrobials   Current:  - Zosyn (start 6/3)  - Vancomycin (start 6/3)    Prior:  - Unasyn (5/29-6/3)  - Meropenem (5/22-5/29)  - Zosyn (5/17-5/22)  - Vancomycin (5/17-5/23)         Physical Exam:   Ranges for vital signs:  Temp:  [98  F (36.7  C)-99.5  F (37.5  C)] 99.5  F (37.5  C)  Heart Rate:  [] 94  Resp:  [11-16] 14  MAP:  [71 mmHg-96 mmHg] 87 mmHg  Arterial Line BP: ()/(53-77) 120/69  FiO2 (%):  [40 %] 40 %  SpO2:  [97 %-100 %] 100 %    Intake/Output Summary (Last 24 hours) at 6/4/2020 1418  Last data filed at 6/4/2020 1400  Gross per 24 hour   Intake 3962 ml   Output 1987 ml   Net 1975 ml     Exam:  GENERAL:  well-developed, well-nourished. Moving limbs frequently.  HEENT:  Head is normocephalic, atraumatic   EYES:  Eyes have anicteric sclerae without conjunctival injection.    Neck: S/p tracheostomy with dried blood at  site  LUNGS:  Clear to auscultation bilateral.  CARDIOVASCULAR:  Regular rate and rhythm with no murmurs, gallops or rubs.  ABDOMEN:  soft, nondistended.Rectal tube in place.  :R groin site with small amount of bleeding/scab formation. Mass (likely hematoma) palpable.  SKIN:  No acute rashes.  Line(s) are in place without any surrounding erythema or exudate.  NEUROLOGIC: Opens eyes spontaneously, moves extremities. Does not follow commands.         Laboratory Data:   Reviewed.  Pertinent for:    Culture data:  6/4/20 blood culture: NGTD  6/3/20 blood culture: NGTD  6/2/20 sputum culture: Acinetobacter species, not baumannii; light growth of Candida albicans  6/1/20 blood culture left arm: Staph epi positive for mec A gene  6/1/20 blood culture right hand: NG  6/1/20 C.diff toxin by PCR: negative  5/27/20 sputum culture: Acinetobacter species, not baumannii (intermediate: ceftazidime); light growth of Candida albicans  5/22/20 sputum culture: Acinetobacter species, not baumannii  5/21/20 sputum culture: Acinetobacter species, not baumannii  5/20/20 sputum culture: Acinetobacter species, not baumannii (intermediate ceftazidime, ceftriaxone); CoNS  5/19/20 sputum culture: MSSA    Negative blood cultures (5/17, 5/18, 5/19, 5/20, 5/22, 5/27, 5/28)    Inflammatory Markers    Recent Labs   Lab Test 05/20/20  0356 05/19/20  0415 05/18/20  0353 05/17/20  1615   SED 83* 33* 8 7   .0* 89.0* 16.0* <2.9       Hematology Studies    Recent Labs   Lab Test 06/09/20  0410 06/08/20  0330 06/07/20  0333 06/06/20  0200   WBC 10.8 9.7 10.4 12.3*   HGB 7.6* 7.9* 8.2* 8.0*   * 100 99 98    412 448 474*     Recent Labs   Lab Test 06/02/20  1059 05/17/20  1615   ANEU 10.0* 20.9*   AEOS 0.6 0.1       Metabolic Studies     Recent Labs   Lab Test 06/09/20  0720 06/09/20  0410 06/08/20  0330 06/07/20  0333    Canceled, Test credited 142 142   POTASSIUM 3.6 Canceled, Test credited 4.0 3.8   CHLORIDE 103 Canceled,  Test credited 110* 113*   CO2 28 Canceled, Test credited 26 26   BUN 20 Canceled, Test credited 27 27   CR 0.91 Canceled, Test credited 0.78 0.72   GFRESTIMATED >90 Canceled, Test credited >90 >90       Hepatic Studies    Recent Labs   Lab Test 06/09/20  0720 06/04/20  0416 06/03/20  0527   BILITOTAL 0.5 0.6 0.5   ALKPHOS 148 127 131   ALBUMIN 2.9* 3.0* 3.0*   AST 52* 57* 63*   * 120* 123*            Imaging:   CT CHEST/ABD/PELVIS W/O CONTRAST (6/3/20)  IMPRESSION: In this patient with history of persistent fever, the  current scan shows:   1. Hypodense collection measuring up to 5 cm in the right inguinal  region abutting the femoral vascular bundle with additional collection  anterior to this collection measuring up to 4.8 cm with fluid/fluid  level and dependent hyperdense contents; collections may represent  hematoma/abscess and/or pseudoaneurysm. Consider right inguinal  Doppler ultrasound for further evaluation.  2. No intra-abdominal or intrathoracic abscess or collection.  3. Patchy groundglass opacity in the right upper lobe may represent  atelectasis versus inflammatory change.  4. Support devices as described above.    US LOWER EXTREMITY ARTERIAL RIGHT (6/3/20)  Impression:   1. No evidence of pseudoaneurysm or arteriovenous fistula.  2. Slightly increased size of right groin fluid collections which may  represent hematomas.    CT HEAD W/O CONTRAST (6/3/20)  Impression:   1. No change in imaging features of diffuse hypoxic ischemic injury.  2. No evident new intracranial pathology.

## 2020-06-09 NOTE — PROGRESS NOTES
Called by nurse regarding bleeding from gastrostomy tube site.  Quik clot had been placed and manual pressured applied before I could see the patient.  On physical exam, there is fibrinous clot near the incision.  No active bleeding noted.  Most likely this represented venous bleeding from the subcutaneous fat.  No arterial bleeding is suspected.      -Dressing changes/manual pressure as needed.  Please call IR if further bleeding noted.

## 2020-06-09 NOTE — PROGRESS NOTES
Consult Note:   Mr. Bishop is a 39 year old male admitted 5/17 for refractory V-fib cardiac arrest with ROSC in ED 2/2 to CAD/LAD thrombosis s/p PCI/stent  Underwent course of VA-ECMO and therapeutic hypothermia s/p successful decannulation 5/19  Managed in MICU for:  Severe diffuse encephalopathy  Refractory VF arrest/CAD s/p stent   Shock liver/GIB (resolved)  LOWELL  Aspiration pneumonia     SICU team consulted for trach/PEG placement. Unable to do PEG due to lack of Endoscopy staff yesterday. Trach done instead.       Current Meds: Heparin subcutaneous q 8h, asa 81 mg, Brilinta - 90 mg BID, Zosyn, pantoprazole.      On examination:  Temp: 98.2  F (36.8  C) Temp src: Axillary     Heart Rate: 80 Resp: 16 SpO2: 100 % O2 Device: Mechanical Ventilator    RASS +3  Does not make eye contact.  Neck supple, no hematoma or crepitus; 6Fr trach in place secured with sutures with dried blood on flange, no active oozing.  Chest good air entry in all field.  Abdomen soft, non-tender, non-distended, bowel sounds present, no surgical scars  Warm peripheries     Labs: reviewed     Sputum Cx 6/2/20: Acinetobacter/Candida  Blood cx 6/1/20: MRSE     CXR Post trach: Interval placement of tracheostomy tube, with tip projecting over the high thoracic trachea. Probable areas of atelectasis in the lung bases, recommend follow-up to clearing to exclude infection.       Assessment:  39 year old male with V-fib arrest 2/2 thrombotic occlusion of LAD - prolonged hospital course PPD 1 s/p percutaneous trach       Plan:  No further intervention  IR planned for PEG today  Sutures can be removed on day 5.  Trach cares  SICU team with sign off.    Jose Hernandez MD PGY-6  Surgical Critical Care Fellow    P: [phone]

## 2020-06-09 NOTE — PROCEDURES
[facility], [place]    Procedure: IR Procedure Note    Date/Time: 6/9/2020 12:52 PM  Performed by: Kingsley Adam MD  Authorized by: Kingsley Adam MD   IR Fellow Physician: Kingsley Adam MD  Other(s) attending procedure: IR staff: Scot Dixon MD    UNIVERSAL PROTOCOL   Site Marked: NA  Prior Images Obtained and Reviewed:  Yes  Required items: Required blood products, implants, devices and special equipment available    Patient identity confirmed:  Verbally with patient, arm band, provided demographic data and hospital-assigned identification number  Patient was reevaluated immediately before administering moderate or deep sedation or anesthesia  Confirmation Checklist:  Patient's identity using two indicators, relevant allergies, procedure was appropriate and matched the consent or emergent situation and correct equipment/implants were available  Time out: Immediately prior to the procedure a time out was called    Universal Protocol: the Joint Commission Universal Protocol was followed    Preparation: Patient was prepped and draped in usual sterile fashion           ANESTHESIA    Anesthesia: Local infiltration  Local Anesthetic:  Lidocaine 1% without epinephrine  Anesthetic Total (mL):  20      SEDATION    Patient Sedated: Yes    Sedation Type:  Moderate (conscious) sedation  Sedation:  Fentanyl and midazolam  Vital signs: Vital signs monitored during sedation    See dictated procedure note for full details.  Findings: 18 fr gastrostomy tube placed.      Specimens: none    Complications: None    Condition: Stable    Plan: Per G tube order set.      PROCEDURE   Patient Tolerance:  Patient tolerated the procedure well with no immediate complications    Length of time physician/provider present for 1:1 monitoring during sedation: 20

## 2020-06-09 NOTE — PRE-PROCEDURE
GENERAL PRE-PROCEDURE:   Procedure:  Gastrostomy tube placement  Date/Time:  6/9/2020 12:01 PM    Verbal consent obtained?: No    Written consent obtained?: Yes    Risks and benefits: Risks, benefits and alternatives were discussed    Consent given by:  Power of  (Patient not able to communicate post-cardiac arrest)  Patient states understanding of procedure being performed: No (Patient not able to communicate post-cardiac arrest)    Patient's understanding of procedure matches consent: No (Patient not able to communicate post-cardiac arrest)    Procedure consent matches procedure scheduled: Yes    Expected level of sedation:  Moderate  Appropriately NPO:  Yes  ASA Class:  Class 3- Severe systemic disease, definite functional limitations  Mallampati  :  N/A- Alternate secured airway (Patient has tracheostomy)  Lungs:  Lungs clear with good breath sounds bilaterally  Heart:  Normal heart sounds and rate  History & Physical reviewed:  History and physical reviewed and no updates needed  Statement of review:  I have reviewed the lab findings, diagnostic data, medications, and the plan for sedation

## 2020-06-09 NOTE — PROGRESS NOTES
CLINICAL NUTRITION SERVICES - REASSESSMENT NOTE     Nutrition Prescription    RECOMMENDATIONS FOR MDs/PROVIDERS TO ORDER:  Fluids and bowel regimen per team     Malnutrition Status:    Unable to assess    Recommendations already ordered by Registered Dietitian (RD):  Impact Peptide @ goal 60 ml/hr (1440 ml/day) to provide 2160 kcals (31 kcal/kg/day), 135 g PRO (1.9 g/kg/day), 1109 ml free H2O, 92 g Fat (50% from MCTs), 202 g CHO and no Fiber daily.    Recommend starting Tri-Vi-Sol (13 ml BID x 10 days) for wound healing via FT.This is the only Vitamin A supplementation available in suspension to administer down FT (26 ml/day = 19,500 units Vitamin A, 910 mg Vitamin C, 10,400 units Vitamin D) + zinc sulfate solution 220mg x 10 days +Mutlvitamin/mineral     Future/Additional Recommendations:  Continue to monitor weight trends and wound healing, split tongue is currently stable (will likely need surgery)  Monitor stool trends and need for additional fiber supplementation     EVALUATION OF THE PROGRESS TOWARD GOALS   Diet: NPO  Nutrition Support: Impact @ 55 ml/hr = 1980 kcals (26), 124 g PRO (1.7), 1016 ml H2O, 84 g Fat (50%  MCTs), 185 g CHO, no Fiber     Intake: Average TF intakes over the past 7 days:  1194 ml, 1791 kcals (25), 112 g pro (1.6 g/kg pro)     NEW FINDINGS   Weight: 5/29: 89.4 kg ( 197#). Lowest weight this admission. 15% wt loss since admission, suspect some LBM loss, but cannot rule out fluids.    Meds: certavite  GI: PEG placement today at bedside   Skin: His tongue will likely heal in a forked shape, and would require a surgery for debridement and suturing for definitive repair. Since he will likely not be eating or speaking we will hold off on any additional surgery. Significantly deteriorated now has split tongue 5/27. Stable on 6/7  Resp: Trach placement 6/8    MALNUTRITION  % Intake: No decreased intake noted  % Weight Loss: > 5% in 1 month (severe)  Subcutaneous Fat Loss: Unable to  assess  Muscle Loss: Unable to assess  Fluid Accumulation/Edema: Unable to assess  Malnutrition Diagnosis: Unable to determine due to RD unable to complete NFPA     Previous Goals   Total avg nutritional intake to meet a minimum of 20 kcal/kg and 1.5 g PRO/kg daily (per dosing wt 72 kg).  Evaluation: Met    Previous Nutrition Diagnosis  Inadequate protein-energy intake related to inadequate TF infusion as evidenced by TF providin (17 kcals/kg), 78 g pro ( 1.1 g/kg pro) - not goal     Evaluation: Improving    CURRENT NUTRITION DIAGNOSIS  Increased nutrient needs kcals/pro related to wound healing ( although not staged) as evidenced by increased protein needs of 1.5-2 g/kg pro+      INTERVENTIONS  Implementation  Enteral Nutrition - Modify rate  Multivitamin/mineral supplement therapy    Goals  Total avg nutritional intake to meet a minimum of 25 kcal/kg and 1.5 g PRO/kg daily (per dosing wt 71 kg).    Monitoring/Evaluation  Progress toward goals will be monitored and evaluated per protocol.      Deandra Troy, PATRICIA, MS, LD  SICU: [phone]

## 2020-06-09 NOTE — PROGRESS NOTES
Cardiology Progress Note  Scot Bishop MRN: [id number]  Age: 39 year old, : 1980  Date: 2020            Assessment and Plan:     Scot Bishop is a 39 year old male who was admitted on 2020 for cardiac arrest. Pt reportedly had chest pain that started on 20. He was using Drano on his pipes at home and did not initially seek medical attention for CP and SOB since it said on the box that Drano can cause those symptoms. He took a shower and had syncopal episode after coming out of the shower. EMS was called; initial rhythm asystole. With chest compressions pt eventually had PEA and then eventually had VF in the field. After multiple cycles of epinephrine had ROSC. He was intubated in the field.   Pt was brought to [facility] ED where he regained a pulse and was brought to the Cath Lab for coronary angiogram. Initially, BP was 90s/60s as he was being transported from ED but he had recurrent cardiac arrest on arrival to Cath Lab. ECG showed ST elevation in aVR. He was promptly placed on VA ECMO. He had thrombotic occlusion of proximal LAD and underwent successful aspiration thrombectomy and PCI w/ NAZIA of proximal and mid LAD.     Interval events: Trach placement at bedside yesterday. Multiple doses of PRN fentanyl yesterday/overnight. Briefly switched to vent settings this AM for low respiratory rate. Back on PS now and tolerating well. Afebrile. Plan for PEG today in IR.     Today's plan:   -increase PM dose of seroquel   -change oxycodone from PRN to scheduled   -discontinue fentanyl boluses   -precedex if needed for agitation   -remove radial art line   -PEG placement today in IR     Neurology: Hypoxic brain injury   Initial CT head negative. Cooled to 34 degrees on arrival; rewarmed  AM. EEG  showed severe diffuse encephalopathy without seizures or epileptiform discharges.   Repeat CTH : multifocal acute/subacute cerebral infarcts   Brain MRI :  diffuse acute/subacute infarcts in bilat cerebral hemispheres, corpus callosum, and periventricular white matter c/w evolving diffuse hypoxic ischemic brain injury   Episode of LUE posturing and rhythmic tremors overnight on 5/27. EEG restarted 5/28, now off.  CTH repeated 6/3 for lack of LLE movement - negative for new stroke or other acute changes.   -s/p trach placement 6/8  -increase PM dose of seroquel   -change oxycodone from PRN to scheduled   -discontinue fentanyl boluses   -precedex if needed for agitation   -moving all extremities although nothing to command   -Neurocrit following; appreciate their recs    Cardiovascular / Hemodynamics: Refractory VF arrest    S/p NAZIA to proximal-mid LAD  Sinus tachycardia   Peripheral VA ECMO inserted for cardiac arrest. LA 16 initially. Suspect ongoing tachycardia d/t evolving MI and post-arrest state. ECMO decannulation at bedside on 5/19; IABP discontinued 5/20. 23 second run on VT overnight on 5/27.   TTE 5/26/20: EF 35-40%, RV normal   EKG 6/8: sinus tachycardia, QTc 451.   -continue ASA 81mg daily and ticagrelor 90mg BID  -continue metoprolol tartrate 25 mg BID   -add lisinopril 5 mg 6/10   -hold statin for now given likely hepatic injury during arrest   Pulmonary: Acute hypoxic respiratory failure  COVID negative  Admission CT chest showed bilateral consolidations c/w aspiration PNA. Had thick secretions that required bagging by RT on 5/20. Bronchoscopy 5/21. Increasing oxygen requirements and pt-vent dyssynchrony on 5/22. Pulmonary re-consulted, infectious work up, pt sedated. Significant pressure injury of tongue w/ splitting of tongue noted by [facility] nurse today. No active bleeding.    Vent settings this AM: PS 7/5 30%  ABG 5/31: 7.48/34/116/26   CXR 6/3: mild pulmonary edema    -s/p trach placement 6/8   -has been tolerating long periods of pressure support   -mucomyst and albuterol nebs PRN   -growing GNR in sputum; unasyn d/c'd, added vanc/zosyn 6/4 per  ID  -vanc d/c'd 6/7 per ID, continues on zosyn   -CXR as needed   -ABGs PRN   -ENT consult 5/27 for pressure injury of tongue; plan for follow up w/ them as outpatient for possible surgical repair of tongue    GI and Nutrition: Shock liver  GIB, resolved   , AST 52. T bili 0.5.    -PEG placement today in IR   -TF on hold since MN    -loose stools; added PRN bowel regimen w/ scheduled oxycodone   -C diff culture 6/1 negative    -GI Prophylaxis: Protonix BID for UGIB   Renal, Fluid and Electrolytes: Acute kidney injury, resolved    Cr improved to 0.91 this AM. 4.3L UOP and net negative 2.3L yesterday. Some urine unmeasured d/t condom cath. Na 135 today.   -change FWF to 30 mL q4hrs   -monitor I/O  -maintain K>3.8 and Mg>2    Infectious Disease: Aspiration pneumonia  Leukocytosis  WBC 10.8, tmax 98.6F. Grew GNR, acinetobacter (not baumannii) in sputum cx. Blood cx 6/1 grew GPCs, sputum cx 6/2 grew non-lactose fermenting GNR. ID consulted 6/3 for persistent fevers. Currently afebrile.   -vancomycin/zosyn x5 days (5/17-5/22) for asp PNA   -discontinued meropenem changed to unasyn 5/29  -unasyn discontinued and added vanc/zosyn 6/4  -discontinued vanc 6/7; continues on zosyn   -CT CAP negative for other source of infection   -scheduled acetaminophen changed to PRN   -C diff culture 6/1 negative      -monitor for signs of infection given lines and leukocytosis   Hematology and Oncology: Thrombocytosis, resolved    Plts 337 today. Receiving ASA/ticagrelor for NAZIA. Transfused 1 unit PRBCs 5/21. Hgb 7.6 today. No signs of active bleeding.    -right inguinal hematoma on US at old ECMO cannulae insertion site, stable   -peripheral smear showed normal platelet morphology   -daily CBC   -transfuse for Hgb<7   -DVT PPX: subcutaneous heparin - on hold this AM d/t plan for PEG today    Endocrinology: No known medical history. BG elevated.  -not requiring insulin gtt  -HgbA1c 5.2   Lines:       R radial arterial line May 17,  "2020  R PICC line May 20, 2020NJ tube May 26, 2020 Restraint: mitts    Current lines are required for patient management       Family update by me today: Yes     Code Status: Full code     The pt was discussed and evaluated with Dr. Hayes, attending physician, who agrees with the assessment and plan above.     Kala Chiang, DNP APRN CNP          Objective     /78 (BP Location: Left arm)   Pulse 129   Temp 98.2  F (36.8  C) (Axillary)   Resp 16   Ht 1.676 m (5' 6\")   Wt 93.7 kg (206 lb 9.1 oz)   SpO2 100%   BMI 33.34 kg/m    Temp:  [97.8  F (36.6  C)-98.6  F (37  C)] 98.2  F (36.8  C)  Heart Rate:  [] 112  Resp:  [11-16] 16  BP: (112)/(78) 112/78  MAP:  [71 mmHg-98 mmHg] 84 mmHg  Arterial Line BP: ()/(53-79) 112/66  FiO2 (%):  [30 %-40 %] 40 %  SpO2:  [97 %-100 %] 100 %  Wt Readings from Last 2 Encounters:   06/08/20 93.7 kg (206 lb 9.1 oz)     I/O last 3 completed shifts:  In: 2101.8 [I.V.:741.8; NG/GT:920]  Out: 4650 [Urine:4350; Emesis/NG output:100; Stool:200]      GENERAL APPEARANCE: S/p trach placement. NAD.  HEENT: No icterus, PERRL 3 mm, trach in place.  CARDIOVASCULAR: sinus tachycardia, normal S1 and S2, no S3 or S4 and no murmur, click or rub. Normal PMI. Pulses dopplerable.  RESP: Clear bilaterally. Mechanical ventilation.    GASTRO: Soft, bowel sounds hyperactive.  GENITOURINARY: Condom cath in place.  EXTREMITIES: Warm, no edema.    NEURO: Intubated, pupils equal and reactive, 3 mm. Moving extremities and opening eyes although not following commands. Withdraws to pain.    INTEGUMENTARY: No rashes. Line sites CDI. Bilaterally groin sites CDI; right groin ecchymotic, incision intact with area of firmness on medial aspect of incision.  LINES/TUBES/DRAINS: R radial Arterial line. PICC. Trach. NJ.           Data:     Vent Settings:  Resp: 16 SpO2: 100 % O2 Device: Mechanical Ventilator      Arterial Blood Gas:   Recent Labs   Lab 06/06/20 0228   PH 7.44   PCO2 35   PO2 " 154*   HCO3 24   O2PER 30.0       Vitals:    20 0400 20 0400 20 0400   Weight: 91.7 kg (202 lb 2.6 oz) 92 kg (202 lb 13.2 oz) 93.7 kg (206 lb 9.1 oz)   I/O last 3 completed shifts:  In: 2101.8 [I.V.:741.8; NG/GT:920]  Out: 4650 [Urine:4350; Emesis/NG output:100; Stool:200]  Recent Labs   Lab 20  0330   NA Canceled, Test credited 142   POTASSIUM Canceled, Test credited 4.0   CHLORIDE Canceled, Test credited 110*   CO2 Canceled, Test credited 26   ANIONGAP Canceled, Test credited 5   GLC Canceled, Test credited 94   BUN Canceled, Test credited 27   CR Canceled, Test credited 0.78   TEN Canceled, Test credited 8.7     No components found for: URINE   Recent Labs   Lab 20  0527   AST 57* 63*   * 123*   BILITOTAL 0.6 0.5   ALBUMIN 3.0* 3.0*   PROTTOTAL 7.9 8.3   ALKPHOS 127 131     Temp: 98.2  F (36.8  C) Temp src: AxillaryTemp  Min: 97.8  F (36.6  C)  Max: 98.6  F (37  C)   Recent Labs   Lab 20  0333 06/06/20  02020  0910   WBC 10.8 9.7 10.4 12.3* 9.7   HGB 7.6* 7.9* 8.2* 8.0* 9.1*   HCT 26.4* 26.2* 26.9* 26.0* 29.8*   * 100 99 98 97   RDW 14.6 14.8 14.7 14.6 14.6    412 448 474* 561*     Recent Labs   Lab 20  0330   INR 1.10     Recent Labs   Lab 20  03320  0333 06/06/20  0200 20  0910   GLC Canceled, Test credited 94 112* 112* 133*       All imaging personally reviewed:  Recent Results (from the past 24 hour(s))   Echocardiogram Complete    Narrative    [id number]  [id number]  [id number]  [id number]^GRIS^YOSHI           [facility],[place]  Echocardiography Laboratory  [address] [place] [id number]     Name: FAINA FORRESTER  MRN: [id number]  : 1980  Study Date: 2020 08:16 AM  Age: 39 yrs  Gender: Male  Patient Location: [facility]  Reason For Study: Cardiac Arrest  Ordering Physician: YOSHI LANDRY  Performed By: Denisse  LYLE Johnson     BSA: 2.1 m2  Height: 66 in  Weight: 231 lb  BP: 132/58 mmHg  _____________________________________________________________________________  __        Procedure  Complete Portable Echo Adult. Technically difficult study.Extremely poor  acoustic windows.  _____________________________________________________________________________  __        Interpretation Summary  VA ECMO at 3.7L/min. Technically difficult study. Extremely poor acoustic  windows.  Severely (EF <30%) reduced left ventricular function on extremely limited  views.  The right ventricle cannot be assessed.  No pericardial effusion is present.  Previous study not available for comparison.  _____________________________________________________________________________  __        Left Ventricle  Left ventricular wall thickness cannot evaluate. Left ventricular size is  normal. Severely (EF <30%) reduced left ventricular function is present. Left  ventricular diastolic function is not assessable. Severe diffuse hypokinesis  is present.     Right Ventricle  The right ventricle cannot be assessed. Right ventricular function cannot be  assessed due to poor image quality.     Mitral Valve  The mitral valve cannot be assessed.        Aortic Valve  The aortic valve opens with each cardiac cycle. On Doppler interrogation,  there is no significant stenosis or regurgitation.     Tricuspid Valve  The tricuspid valve cannot be assessed. Pulmonary artery systolic pressure  cannot be assessed.     Pulmonic Valve  The pulmonic valve cannot be assessed.     Vessels  The aorta root cannot be assessed. The thoracic aorta cannot be assessed.  Venous ECMO cannula is visualized traversing the IVC.     Pericardium  No pericardial effusion is present.     Compared to Previous Study  Previous study not available for comparison.     _____________________________________________________________________________  __                       Report approved by: Manuel  Ashlee Marcial 05/18/2020 09:18 AM                 _____________________________________________________________________________  __                Medications     Current Facility-Administered Medications   Medication     0.9% sodium chloride BOLUS     0.9% sodium chloride BOLUS     acetaminophen (TYLENOL) tablet 650 mg     acetylcysteine (MUCOMYST) 10 % nebulizer solution 4 mL     artificial tears ophthalmic ointment     aspirin (ASA) chewable tablet 81 mg     calcium chloride in  mL intermittent infusion 1 g     chlorhexidine (PERIDEX) 0.12 % solution 15 mL     dextrose 10% infusion     glucose gel 15-30 g    Or     dextrose 50 % injection 25-50 mL    Or     glucagon injection 1 mg     fentaNYL (PF) (SUBLIMAZE) injection  mcg     heparin ANTICOAGULANT injection 5,000 Units     heparin lock flush 10 UNIT/ML injection 5-10 mL     heparin lock flush 10 UNIT/ML injection 5-10 mL     HOLD MEDICATION (one time)     ipratropium - albuterol 0.5 mg/2.5 mg/3 mL (DUONEB) neb solution 3 mL     levalbuterol (XOPENEX) neb solution 1.25 mg     levETIRAcetam (KEPPRA) solution 1,000 mg     lidocaine (LMX4) cream     lidocaine 1 % 0.1-1 mL     [START ON 6/10/2020] lisinopril (ZESTRIL) tablet 5 mg     magnesium sulfate 2 g in water intermittent infusion     magnesium sulfate 4 g in 100 mL sterile water (premade)     melatonin tablet 10 mg     metoprolol tartrate (LOPRESSOR) half-tab 25 mg     midazolam (VERSED) injection 1-2 mg     multivitamins w/minerals (CERTAVITE) liquid 15 mL     naloxone (NARCAN) injection 0.1-0.4 mg     oxyCODONE (ROXICODONE) tablet 5 mg     pantoprazole (PROTONIX) 2 mg/mL suspension 40 mg     piperacillin-tazobactam (ZOSYN) 4.5 g vial to attach to  mL bag     potassium chloride (KLOR-CON) Packet 20-40 mEq     potassium chloride 10 mEq in 100 mL intermittent infusion with 10 mg lidocaine     potassium chloride 10 mEq in 100 mL sterile water intermittent infusion (premix)     potassium  chloride 20 mEq in 50 mL intermittent infusion     potassium chloride ER (KLOR-CON M) CR tablet 20-40 mEq     QUEtiapine (SEROquel) tablet 25 mg     sodium chloride (NEBUSAL) 3 % neb solution 3 mL     sodium chloride (PF) 0.9% PF flush 10-20 mL     sodium chloride (PF) 0.9% PF flush 3 mL     sodium chloride (PF) 0.9% PF flush 3 mL     sodium phosphate 10 mmol in D5W intermittent infusion     sodium phosphate 15 mmol in D5W intermittent infusion     sodium phosphate 20 mmol in D5W intermittent infusion     sodium phosphate 25 mmol in D5W intermittent infusion     ticagrelor (BRILINTA) tablet 90 mg                      Critical Care Services Progress Note:     Scot Bishop remains critically ill with: Out of hospital Cardiac arrest.      I personally examined and evaluated the patient today.   The patient s prognosis today is tentative  I have evaluated all laboratory values and imaging studies from the past 24 hours.  Key findings and decisions made today included   -increase PM dose of seroquel   -change oxycodone from PRN to scheduled   -discontinue fentanyl boluses   -precedex if needed for agitation   -remove radial art line   -PEG placement today in IR   I personally managed the ventilator, sedation, pain control and analgesia, metabolic abnormalities, antibiotic therapy, nutritional status and vasoactive medications.   Consults ongoing and ordered are IR  Procedures that will happen today are: PEG placement   All treatments were placed at my direction.  I formulated today s plan with the house staff team or resident(s) and agree with the findings and plan in the associated note.       The above plans and care have been discussed with none and all questions and concerns were addressed.     I spent a total of 30 minutes (excluding procedure time) personally providing and directing critical care services at the bedside and on the critical care unit for Scot Bishop.        Grant Hayes MD

## 2020-06-10 ENCOUNTER — APPOINTMENT (OUTPATIENT)
Dept: OCCUPATIONAL THERAPY | Facility: CLINIC | Age: 40
End: 2020-06-10
Payer: MEDICAID

## 2020-06-10 ENCOUNTER — APPOINTMENT (OUTPATIENT)
Dept: CARDIOLOGY | Facility: CLINIC | Age: 40
End: 2020-06-10
Attending: NURSE PRACTITIONER
Payer: MEDICAID

## 2020-06-10 LAB
ANION GAP SERPL CALCULATED.3IONS-SCNC: 6 MMOL/L (ref 3–14)
BACTERIA SPEC CULT: NO GROWTH
BUN SERPL-MCNC: 20 MG/DL (ref 7–30)
CALCIUM SERPL-MCNC: 8.7 MG/DL (ref 8.5–10.1)
CHLORIDE SERPL-SCNC: 106 MMOL/L (ref 94–109)
CO2 SERPL-SCNC: 29 MMOL/L (ref 20–32)
CREAT SERPL-MCNC: 0.83 MG/DL (ref 0.66–1.25)
ERYTHROCYTE [DISTWIDTH] IN BLOOD BY AUTOMATED COUNT: 14.4 % (ref 10–15)
GFR SERPL CREATININE-BSD FRML MDRD: >90 ML/MIN/{1.73_M2}
GLUCOSE BLDC GLUCOMTR-MCNC: 99 MG/DL (ref 70–99)
GLUCOSE SERPL-MCNC: 96 MG/DL (ref 70–99)
HCT VFR BLD AUTO: 27.2 % (ref 40–53)
HGB BLD-MCNC: 8.5 G/DL (ref 13.3–17.7)
INTERPRETATION ECG - MUSE: NORMAL
Lab: NORMAL
MAGNESIUM SERPL-MCNC: 1.9 MG/DL (ref 1.6–2.3)
MCH RBC QN AUTO: 29.7 PG (ref 26.5–33)
MCHC RBC AUTO-ENTMCNC: 31.3 G/DL (ref 31.5–36.5)
MCV RBC AUTO: 95 FL (ref 78–100)
PHOSPHATE SERPL-MCNC: 3.6 MG/DL (ref 2.5–4.5)
PLATELET # BLD AUTO: 389 10E9/L (ref 150–450)
POTASSIUM SERPL-SCNC: 3.4 MMOL/L (ref 3.4–5.3)
RBC # BLD AUTO: 2.86 10E12/L (ref 4.4–5.9)
SODIUM SERPL-SCNC: 140 MMOL/L (ref 133–144)
SPECIMEN SOURCE: NORMAL
WBC # BLD AUTO: 11.6 10E9/L (ref 4–11)

## 2020-06-10 PROCEDURE — 25000132 ZZH RX MED GY IP 250 OP 250 PS 637: Performed by: STUDENT IN AN ORGANIZED HEALTH CARE EDUCATION/TRAINING PROGRAM

## 2020-06-10 PROCEDURE — 25000132 ZZH RX MED GY IP 250 OP 250 PS 637: Performed by: NURSE PRACTITIONER

## 2020-06-10 PROCEDURE — 84100 ASSAY OF PHOSPHORUS: CPT | Performed by: STUDENT IN AN ORGANIZED HEALTH CARE EDUCATION/TRAINING PROGRAM

## 2020-06-10 PROCEDURE — 93325 DOPPLER ECHO COLOR FLOW MAPG: CPT

## 2020-06-10 PROCEDURE — 25000125 ZZHC RX 250: Performed by: NURSE PRACTITIONER

## 2020-06-10 PROCEDURE — 25000128 H RX IP 250 OP 636: Performed by: NURSE PRACTITIONER

## 2020-06-10 PROCEDURE — 93325 DOPPLER ECHO COLOR FLOW MAPG: CPT | Mod: 26 | Performed by: INTERNAL MEDICINE

## 2020-06-10 PROCEDURE — 93308 TTE F-UP OR LMTD: CPT | Mod: 26 | Performed by: INTERNAL MEDICINE

## 2020-06-10 PROCEDURE — 40000014 ZZH STATISTIC ARTERIAL MONITORING DAILY

## 2020-06-10 PROCEDURE — 80048 BASIC METABOLIC PNL TOTAL CA: CPT | Performed by: STUDENT IN AN ORGANIZED HEALTH CARE EDUCATION/TRAINING PROGRAM

## 2020-06-10 PROCEDURE — 94003 VENT MGMT INPAT SUBQ DAY: CPT

## 2020-06-10 PROCEDURE — 93005 ELECTROCARDIOGRAM TRACING: CPT

## 2020-06-10 PROCEDURE — 93010 ELECTROCARDIOGRAM REPORT: CPT | Performed by: INTERNAL MEDICINE

## 2020-06-10 PROCEDURE — 99233 SBSQ HOSP IP/OBS HIGH 50: CPT | Performed by: INTERNAL MEDICINE

## 2020-06-10 PROCEDURE — 83735 ASSAY OF MAGNESIUM: CPT | Performed by: STUDENT IN AN ORGANIZED HEALTH CARE EDUCATION/TRAINING PROGRAM

## 2020-06-10 PROCEDURE — 84100 ASSAY OF PHOSPHORUS: CPT

## 2020-06-10 PROCEDURE — 25000128 H RX IP 250 OP 636: Performed by: STUDENT IN AN ORGANIZED HEALTH CARE EDUCATION/TRAINING PROGRAM

## 2020-06-10 PROCEDURE — 00000146 ZZHCL STATISTIC GLUCOSE BY METER IP

## 2020-06-10 PROCEDURE — 85027 COMPLETE CBC AUTOMATED: CPT | Performed by: STUDENT IN AN ORGANIZED HEALTH CARE EDUCATION/TRAINING PROGRAM

## 2020-06-10 PROCEDURE — 40000275 ZZH STATISTIC RCP TIME EA 10 MIN

## 2020-06-10 PROCEDURE — 25000132 ZZH RX MED GY IP 250 OP 250 PS 637

## 2020-06-10 PROCEDURE — 93321 DOPPLER ECHO F-UP/LMTD STD: CPT | Mod: 26 | Performed by: INTERNAL MEDICINE

## 2020-06-10 PROCEDURE — 25000128 H RX IP 250 OP 636: Performed by: INTERNAL MEDICINE

## 2020-06-10 PROCEDURE — 20000004 ZZH R&B ICU UMMC

## 2020-06-10 PROCEDURE — 27210437 ZZH NUTRITION PRODUCT SEMIELEM INTERMED LITER

## 2020-06-10 PROCEDURE — 25000132 ZZH RX MED GY IP 250 OP 250 PS 637: Performed by: INTERNAL MEDICINE

## 2020-06-10 PROCEDURE — 25500064 ZZH RX 255 OP 636: Performed by: INTERNAL MEDICINE

## 2020-06-10 PROCEDURE — 97110 THERAPEUTIC EXERCISES: CPT | Mod: GO

## 2020-06-10 RX ORDER — NOREPINEPHRINE BITARTRATE 0.06 MG/ML
INJECTION, SOLUTION INTRAVENOUS
Status: DISCONTINUED
Start: 2020-06-10 | End: 2020-06-10 | Stop reason: HOSPADM

## 2020-06-10 RX ORDER — PHENOL 1.4 %
10 AEROSOL, SPRAY (ML) MUCOUS MEMBRANE
Qty: 30 TABLET | Refills: 0 | Status: CANCELLED
Start: 2020-06-10

## 2020-06-10 RX ORDER — OXYCODONE HYDROCHLORIDE 5 MG/1
5 TABLET ORAL EVERY 6 HOURS
Qty: 30 TABLET | Refills: 0 | Status: CANCELLED
Start: 2020-06-10

## 2020-06-10 RX ORDER — ATORVASTATIN CALCIUM 10 MG/1
10 TABLET, FILM COATED ORAL EVERY EVENING
Status: DISCONTINUED | OUTPATIENT
Start: 2020-06-10 | End: 2020-06-18

## 2020-06-10 RX ORDER — LEVALBUTEROL INHALATION SOLUTION 0.63 MG/3ML
0.63 SOLUTION RESPIRATORY (INHALATION) EVERY 4 HOURS PRN
Status: DISCONTINUED | OUTPATIENT
Start: 2020-06-10 | End: 2020-06-25 | Stop reason: HOSPADM

## 2020-06-10 RX ADMIN — TICAGRELOR 90 MG: 90 TABLET ORAL at 08:01

## 2020-06-10 RX ADMIN — TICAGRELOR 90 MG: 90 TABLET ORAL at 19:44

## 2020-06-10 RX ADMIN — CHLORHEXIDINE GLUCONATE 0.12% ORAL RINSE 15 ML: 1.2 LIQUID ORAL at 08:01

## 2020-06-10 RX ADMIN — ATORVASTATIN CALCIUM 10 MG: 10 TABLET, FILM COATED ORAL at 19:44

## 2020-06-10 RX ADMIN — LISINOPRIL 5 MG: 5 TABLET ORAL at 08:01

## 2020-06-10 RX ADMIN — Medication 25 MG: at 19:44

## 2020-06-10 RX ADMIN — ASPIRIN 81 MG CHEWABLE TABLET 81 MG: 81 TABLET CHEWABLE at 08:01

## 2020-06-10 RX ADMIN — MULTIVITAMIN 15 ML: LIQUID ORAL at 08:01

## 2020-06-10 RX ADMIN — HUMAN ALBUMIN MICROSPHERES AND PERFLUTREN 5 ML: 10; .22 INJECTION, SOLUTION INTRAVENOUS at 14:30

## 2020-06-10 RX ADMIN — OXYCODONE HYDROCHLORIDE 5 MG: 5 TABLET ORAL at 03:33

## 2020-06-10 RX ADMIN — CHLORHEXIDINE GLUCONATE 0.12% ORAL RINSE 15 ML: 1.2 LIQUID ORAL at 12:37

## 2020-06-10 RX ADMIN — HEPARIN SODIUM 5000 UNITS: 5000 INJECTION, SOLUTION INTRAVENOUS; SUBCUTANEOUS at 16:34

## 2020-06-10 RX ADMIN — HEPARIN SODIUM 5000 UNITS: 5000 INJECTION, SOLUTION INTRAVENOUS; SUBCUTANEOUS at 00:10

## 2020-06-10 RX ADMIN — Medication 40 MG: at 19:45

## 2020-06-10 RX ADMIN — POTASSIUM CHLORIDE 20 MEQ: 1.5 POWDER, FOR SOLUTION ORAL at 05:04

## 2020-06-10 RX ADMIN — Medication 13 ML: at 08:02

## 2020-06-10 RX ADMIN — ACETAMINOPHEN 650 MG: 325 TABLET, FILM COATED ORAL at 08:01

## 2020-06-10 RX ADMIN — HEPARIN SODIUM 5000 UNITS: 5000 INJECTION, SOLUTION INTRAVENOUS; SUBCUTANEOUS at 08:01

## 2020-06-10 RX ADMIN — OXYCODONE HYDROCHLORIDE 5 MG: 5 TABLET ORAL at 21:29

## 2020-06-10 RX ADMIN — PIPERACILLIN AND TAZOBACTAM 4.5 G: 4; .5 INJECTION, POWDER, FOR SOLUTION INTRAVENOUS at 16:34

## 2020-06-10 RX ADMIN — Medication 40 MG: at 08:02

## 2020-06-10 RX ADMIN — CHLORHEXIDINE GLUCONATE 0.12% ORAL RINSE 15 ML: 1.2 LIQUID ORAL at 16:34

## 2020-06-10 RX ADMIN — Medication 220 MG: at 08:02

## 2020-06-10 RX ADMIN — QUETIAPINE FUMARATE 25 MG: 25 TABLET ORAL at 08:01

## 2020-06-10 RX ADMIN — LEVETIRACETAM 1000 MG: 100 SOLUTION ORAL at 08:02

## 2020-06-10 RX ADMIN — PIPERACILLIN AND TAZOBACTAM 4.5 G: 4; .5 INJECTION, POWDER, FOR SOLUTION INTRAVENOUS at 10:02

## 2020-06-10 RX ADMIN — Medication 25 MG: at 08:01

## 2020-06-10 RX ADMIN — MAGNESIUM SULFATE HEPTAHYDRATE 2 G: 40 INJECTION, SOLUTION INTRAVENOUS at 05:04

## 2020-06-10 RX ADMIN — QUETIAPINE FUMARATE 50 MG: 50 TABLET ORAL at 21:29

## 2020-06-10 RX ADMIN — Medication 13 ML: at 19:45

## 2020-06-10 RX ADMIN — LEVETIRACETAM 1000 MG: 100 SOLUTION ORAL at 19:44

## 2020-06-10 RX ADMIN — OXYCODONE HYDROCHLORIDE 5 MG: 5 TABLET ORAL at 10:03

## 2020-06-10 RX ADMIN — CHLORHEXIDINE GLUCONATE 0.12% ORAL RINSE 15 ML: 1.2 LIQUID ORAL at 19:44

## 2020-06-10 RX ADMIN — PIPERACILLIN AND TAZOBACTAM 4.5 G: 4; .5 INJECTION, POWDER, FOR SOLUTION INTRAVENOUS at 03:33

## 2020-06-10 ASSESSMENT — ACTIVITIES OF DAILY LIVING (ADL)
ADLS_ACUITY_SCORE: 18

## 2020-06-10 NOTE — PROGRESS NOTES
[facility] General ID Service: Follow Up Note      Patient:  Scot Bishop   Date of birth 1980, Medical record number [id number]  Date of Visit:  06/10/2020  Date of Admission: 5/17/2020         Assessment and Recommendations:   ID Problem List:  1. Fever- resolved  2. Leukocytosis- resolving  3. Acinetobacter (not baumannii) on sputum culture   4. Blood culture with MRSE  5. Right groin fluid collection  6. Acute respiratory failure  7. STEMI with PEA/VF out of hospital arrest s/p NAZIA to LAD  8. Hypoxic brain injury  9. VA-ECMO (5/17-5/19)    Recommendations:  1. Stop Zosyn- patient has completed a treatment course for Acinetobacter hospital acquired pneumonia  2. ID will sign off at this time, please don't hesitate to contact [facility] general ID team should further questions arise.        Discussion:  Scot Bishop is a 39 year old male with no significant known past medical history who presented to [facility] on 5/17/20 after a PEA/V Fib arrest and STEMI. Was taken to the cath lab emergently and had NAZIA placed to LAD, found to have LAD thrombotic occlusion s/p successful aspiration thrombectomy, was cannulated for VA ECMO (5/17-5/19).      Wound care RN and ENT have been following for tongue ulceration and split, though does not appear infected. Acute hypoxic respiratory failure and Acinetobacter (not baumanni) on sputum cultures (5/20, 5/22, 5/27), has been on Meropenem --> Unasyn without clearing. Fever to 101.5F on 5/22 and has continued to have intermittent fevers since, temperature to 102F on 5/28, has been persistently febrile >100.6 since 6/1 despite scheduled tylenol. Increase in fever curve not correlated with transition to Unasyn. Last chest CT was 5/17 and lungs with effusions c/w and atelectasis/consolidation in setting of recent arrest. CXR on 6/3 with increased LLL opacity, CT with some GGO though no consolidation. Sputum (6/2) growing Acinetobacter (not baumannii) and Candida albicans.     1/2  blood cultures 6/1 with MRSE. Started on vancomycin while awaiting results of follow up cultures. Most likely contamination as additional cultures have not had any growth.    CT Chest/abd/pelvis with fluid collection in right groin with reactive lymphadenopathy. Subsequent US showed fluid collections and no evidence of pseudoaneurysm. Hematoma vs abscess though at this point favor hematoma.    Hematoma may be contributing to fevers. No clear evidence of pneumonia or additional source at this time. Central fevers considered given hypoxic ischemic brain injury. Has shown improvement in fever curve since 6/3, though not necessarily correlated with antibiotic changes (Unasyn --> Zosyn and addition of Vancomycin). Would stop Zosyn today and monitor off of antibiotics.       Don't hesitate to call with questions.     Attestation:  I have reviewed today's vital signs, medications, labs and imaging.  Emy Arana PA-C, Pager # [phone]        Physician Attestation   I, Priyanka Stanley, saw and evaluated Scot Bishop as part of a shared visit.  I have reviewed and discussed with the advanced practice provider their history, physical and plan.    I personally reviewed the vital signs, medications and labs.    My key history or physical exam findings: Patient remains restless moving in bed. Not interactive though. Has tracheostomy in place.     Key management decisions made by me: Patient has had a 7 day treatment course for Acinetobacter pneumonia and he is improved now. Would stop Zosyn at this time. Recommend discontinuing the PICC line if not essential for medications to minimize the risk of developing a catheter related blood stream infection.     We will sign off. Please call if questions.     Priyanka Stanley MD  Date of Service (when I saw the patient): 06/10/20          Interval History:     S/P PEG tube on 6/9. Restless but not significantly thrashing. Light bleeding from right groin site. No acute  events.         Review of Systems:   Unable to obtain, not responsive to questions.          Current Antimicrobials   Current:  - Zosyn (start 6/3)  - Vancomycin (start 6/3)    Prior:  - Unasyn (5/29-6/3)  - Meropenem (5/22-5/29)  - Zosyn (5/17-5/22)  - Vancomycin (5/17-5/23)         Physical Exam:   Ranges for vital signs:  Temp:  [97.9  F (36.6  C)-99.6  F (37.6  C)] 97.9  F (36.6  C)  Pulse:  [] 101  Heart Rate:  [] 114  Resp:  [12-27] 14  BP: ()/(67-93) 97/71  MAP:  [83 mmHg-96 mmHg] 92 mmHg  Arterial Line BP: (109-129)/(61-85) 109/85  FiO2 (%):  [30 %-40 %] 30 %  SpO2:  [91 %-100 %] 100 %    Intake/Output Summary (Last 24 hours) at 6/4/2020 1418  Last data filed at 6/4/2020 1400  Gross per 24 hour   Intake 3962 ml   Output 1987 ml   Net 1975 ml     Exam:  GENERAL:  well-developed, well-nourished. Moving limbs frequently.  HEENT:  Head is normocephalic, atraumatic   Neck: S/p tracheostomy with dried blood at site  LUNGS:  Clear to auscultation bilateral.  CARDIOVASCULAR:  Regular rate and rhythm with no murmurs, gallops or rubs.  ABDOMEN:  soft, nondistended. Rectal tube in place.  :R groin site with mild bleeding onto dressing, no erythema or purulence. Mass (likely hematoma) palpable.  SKIN:  No acute rashes.  Line(s) are in place without any surrounding erythema or exudate.  NEUROLOGIC: Eyes closed, moves extremities. Does not follow commands.         Laboratory Data:   Reviewed.  Pertinent for:    Culture data:  6/4/20 blood culture: NGTD  6/3/20 blood culture: NGTD  6/2/20 sputum culture: Acinetobacter species, not baumannii; light growth of Candida albicans  6/1/20 blood culture left arm: Staph epi positive for mec A gene  6/1/20 blood culture right hand: NG  6/1/20 C.diff toxin by PCR: negative  5/27/20 sputum culture: Acinetobacter species, not baumannii (intermediate: ceftazidime); light growth of Candida albicans  5/22/20 sputum culture: Acinetobacter species, not baumannii  5/21/20  sputum culture: Acinetobacter species, not baumannii  5/20/20 sputum culture: Acinetobacter species, not baumannii (intermediate ceftazidime, ceftriaxone); CoNS  5/19/20 sputum culture: MSSA    Negative blood cultures (5/17, 5/18, 5/19, 5/20, 5/22, 5/27, 5/28)    Inflammatory Markers    Recent Labs   Lab Test 05/20/20  0356 05/19/20  0415 05/18/20  0353 05/17/20  1615   SED 83* 33* 8 7   .0* 89.0* 16.0* <2.9       Hematology Studies    Recent Labs   Lab Test 06/10/20  0348 06/09/20  0410 06/08/20  0330 06/07/20  0333   WBC 11.6* 10.8 9.7 10.4   HGB 8.5* 7.6* 7.9* 8.2*   MCV 95 103* 100 99    337 412 448     Recent Labs   Lab Test 06/02/20  1059 05/17/20  1615   ANEU 10.0* 20.9*   AEOS 0.6 0.1       Metabolic Studies     Recent Labs   Lab Test 06/10/20  0348 06/09/20  0720 06/09/20  0410 06/08/20  0330    135 Canceled, Test credited 142   POTASSIUM 3.4 3.6 Canceled, Test credited 4.0   CHLORIDE 106 103 Canceled, Test credited 110*   CO2 29 28 Canceled, Test credited 26   BUN 20 20 Canceled, Test credited 27   CR 0.83 0.91 Canceled, Test credited 0.78   GFRESTIMATED >90 >90 Canceled, Test credited >90       Hepatic Studies    Recent Labs   Lab Test 06/09/20  0720 06/04/20  0416 06/03/20  0527   BILITOTAL 0.5 0.6 0.5   ALKPHOS 148 127 131   ALBUMIN 2.9* 3.0* 3.0*   AST 52* 57* 63*   * 120* 123*            Imaging:   CT CHEST/ABD/PELVIS W/O CONTRAST (6/3/20)  IMPRESSION: In this patient with history of persistent fever, the  current scan shows:   1. Hypodense collection measuring up to 5 cm in the right inguinal  region abutting the femoral vascular bundle with additional collection  anterior to this collection measuring up to 4.8 cm with fluid/fluid  level and dependent hyperdense contents; collections may represent  hematoma/abscess and/or pseudoaneurysm. Consider right inguinal  Doppler ultrasound for further evaluation.  2. No intra-abdominal or intrathoracic abscess or collection.  3.  Patchy groundglass opacity in the right upper lobe may represent  atelectasis versus inflammatory change.  4. Support devices as described above.    US LOWER EXTREMITY ARTERIAL RIGHT (6/3/20)  Impression:   1. No evidence of pseudoaneurysm or arteriovenous fistula.  2. Slightly increased size of right groin fluid collections which may  represent hematomas.    CT HEAD W/O CONTRAST (6/3/20)  Impression:   1. No change in imaging features of diffuse hypoxic ischemic injury.  2. No evident new intracranial pathology.

## 2020-06-10 NOTE — PLAN OF CARE
ICU End of Shift Summary. See flowsheets for vital signs and detailed assessment.    Changes this shift: Percutaneous gastrostomy tube placement done with small to moderate amount of bleeding  though contained by manual pressure and quick-clot. Continues to have random movements with legs and arms. Eyes are conjugate but occasionally roving and at other times he appears to focus on me when I talk to him, but does not follow any commands. Parents connected with him via iPad.     Plan: Continue with current plan of care.

## 2020-06-10 NOTE — PLAN OF CARE
Major Shift Events: Neuro unchanged, very restless all day, continues to move all extremities with non-purposeful movement and throwing legs out of bed. No evidence of pain; oxy does not seem to help. VSS. Shiley 6.0. PS 7/5 FI02 30 overnight; T-dome 30% since 1200. Thick blood-streaked secretions; moderate. UO adequate via condom cath. Facetime with mom this afternoon.    Plan: Transfer to [facility] once a room is available.     For vital signs and complete assessments, please see documentation flowsheets.     Problem: Airway Clearance Ineffective  Goal: Effective Airway Clearance  6/10/2020 1808 by Pinky Dodd, RN  Outcome: Improving  6/10/2020 1807 by Pinky Dodd, RN  Outcome: Improving  6/10/2020 0440 by Loreta Ellington, RN  Outcome: No Change     Problem: Confusion Acute  Goal: Optimal Cognitive Function  Outcome: No Change

## 2020-06-10 NOTE — PLAN OF CARE
Discharge Planner OT   Patient plan for discharge: Not stated  Current status: Pt with no eye opening or following commands. Pt restless, more resistive to movement but appropriate. Promoted functional ROM and joint integrity by providing PROM to BUE/BLE in all planes of motion.  Vital signs assessed throughout session and remain stable on SPN/CPAP with 35% FiO2, PEEP 5, RR 14 and O2 99%, .   Barriers to return to prior living situation: medical status, cognition, impaired ADLs  Recommendations for discharge: LTACH  Rationale for recommendations: Pt is currently below functional baseline for ADLs, transfers, and functional mobility, would benefit from continued therapy to address above deficits and maximize strength/independence in order to return to PLOF.         Entered by: Melanie Silveira 06/10/2020 9:06 AM

## 2020-06-10 NOTE — PROGRESS NOTES
Care Coordinator - Discharge Planning    Admission Date/Time:  5/17/2020  Attending MD:  Alexi Alanis MD     Data  Date of initial CC assessment:  5/22/20  Chart reviewed, discussed with interdisciplinary team.   Patient was admitted for:   1. ST elevation MI (STEMI) (H)    2. Cardiac arrest (H)    3. Cardiogenic shock (H)    4. Posturing episode         Assessment   Previous referral completed to [facility] for LTACH needs. CSI team has updated me that trach/peg were placed and pt is stable for transfer to [facility] today.       Coordination of Care and Referrals:   Spoke with pt's dad Gerald and step mom Audrey yesterday to review process of [facility] transfer. Golden [facility] liaison updated yesterday and today with medical updates and request to speak with pt's family to answer questions. Golden updated me today that he has spoken with pt's dad today and answered questions. Unfortunately, there is not a bed available for transfer today but pt is on transfer list once a bed opens up. Elaina Chiang NP with I will update pt's family of the above plan. I have asked Elaina to work on completing discharge summary and orders to transfer with pt when he a bed opens up. Bedside RN updated with the plan. [facility] to follow for discharge planning.     Plan  Anticipated Discharge Date:  6/11/20  Anticipated Discharge Plan:  [facility]    Cl Barragan RN, BSN  ICU Care Coordinator  Pager: [phone]  Phone:  [phone]

## 2020-06-10 NOTE — PLAN OF CARE
Major Shift Events:  Neuro unchanged. Sinus tach, SBP WDL, and afebrile. PS overnight 7/5, moderate thick white secretions. PEG paced 6/9. TF resumed. Condom cath in place. And rectal tube with 200cc out.   Plan: continue to monitor and notify MD of changes.   For vital signs and complete assessments, please see documentation flowsheets.

## 2020-06-10 NOTE — PROGRESS NOTES
Cardiology Progress Note  Scot Bishop MRN: [id number]  Age: 39 year old, : 1980  Date: 2020            Assessment and Plan:     Scot Bishop is a 39 year old male who was admitted on 2020 for cardiac arrest. Pt reportedly had chest pain that started on 20. He was using Drano on his pipes at home and did not initially seek medical attention for CP and SOB since it said on the box that Drano can cause those symptoms. He took a shower and had syncopal episode after coming out of the shower. EMS was called; initial rhythm asystole. With chest compressions pt eventually had PEA and then eventually had VF in the field. After multiple cycles of epinephrine had ROSC. He was intubated in the field.   Pt was brought to [facility] ED where he regained a pulse and was brought to the Cath Lab for coronary angiogram. Initially, BP was 90s/60s as he was being transported from ED but he had recurrent cardiac arrest on arrival to Cath Lab. ECG showed ST elevation in aVR. He was promptly placed on VA ECMO. He had thrombotic occlusion of proximal LAD and underwent successful aspiration thrombectomy and PCI w/ NAZIA of proximal and mid LAD.     Interval events: s/p PEG tube placement yesterday in IR; minor oozing at insertion site which has resolved. TF restarted yesterday. Tolerating pressure support. Afebrile.      Today's plan:   -repeat echo  -start lisinopril 5 mg daily  -start atorvastatin 10 mg daily  -attempt trach doming per RT  -awaiting transfer to [facility]     Neurology: Hypoxic brain injury   Initial CT head negative. Cooled to 34 degrees on arrival; rewarmed  AM. EEG  showed severe diffuse encephalopathy without seizures or epileptiform discharges.   Repeat CTH : multifocal acute/subacute cerebral infarcts   Brain MRI : diffuse acute/subacute infarcts in bilat cerebral hemispheres, corpus callosum, and periventricular white matter c/w evolving  diffuse hypoxic ischemic brain injury   Episode of LUE posturing and rhythmic tremors overnight on 5/27. EEG restarted 5/28, now off.  CTH repeated 6/3 for lack of LLE movement - negative for new stroke or other acute changes.   -s/p trach placement 6/8  -seroquel 25 mg qAM and 50 mg qPM    -oxycodone scheduled    -discontinue fentanyl boluses   -precedex if needed for agitation   -moving all extremities although nothing to command    Cardiovascular / Hemodynamics: Refractory VF arrest    S/p NAZIA to proximal-mid LAD  Sinus tachycardia   Peripheral VA ECMO inserted for cardiac arrest. LA 16 initially. Suspect ongoing tachycardia d/t evolving MI and post-arrest state. ECMO decannulation at bedside on 5/19; IABP discontinued 5/20. 23 second run on VT overnight on 5/27.   TTE 5/26/20: EF 35-40%, RV normal   EKG 6/8: sinus tachycardia, QTc 449.   -continue ASA 81mg daily and ticagrelor 90mg BID  -continue metoprolol tartrate 25 mg BID   -add lisinopril 5 mg   -add atorvastatin 10 mg    Pulmonary: Acute hypoxic respiratory failure  COVID negative  Admission CT chest showed bilateral consolidations c/w aspiration PNA. Had thick secretions that required bagging by RT on 5/20. Bronchoscopy 5/21. Increasing oxygen requirements and pt-vent dyssynchrony on 5/22. Pulmonary re-consulted, infectious work up, pt sedated. Significant pressure injury of tongue w/ splitting of tongue noted by [facility] nurse today. No active bleeding.    Vent settings this AM: PS 7/5 30%  ABG 5/31: 7.48/34/116/26   CXR 6/3: mild pulmonary edema    -s/p trach placement 6/8   -has been tolerating long periods of pressure support   -mucomyst and albuterol nebs PRN   -growing GNR in sputum; unasyn d/c'd, added vanc/zosyn 6/4 per ID  -vanc d/c'd 6/7 per ID  -continues on zosyn - will f/u w/ ID to determine if still needed  -CXR as needed   -ABGs PRN   -ENT consult 5/27 for pressure injury of tongue; plan for follow up w/ them as outpatient for possible surgical  repair of tongue    GI and Nutrition: Shock liver  GIB, resolved   , AST 52 on 6/9. T bili 0.5.    -s/p PEG placement in IR   -TF restarted yesterday via PEG     -loose stools; added PRN bowel regimen w/ scheduled oxycodone   -C diff culture 6/1 negative    -GI Prophylaxis: Protonix BID for UGIB   Renal, Fluid and Electrolytes: Acute kidney injury, resolved    Cr improved to 0.83 this AM. 2.3L UOP and net negative 1.4L yesterday. Some urine unmeasured d/t condom cath. Na 140 today.   -continue FWF 30 mL q4hrs   -monitor I/O  -maintain K>3.8 and Mg>2    Infectious Disease: Aspiration pneumonia  Leukocytosis  WBC 11.6, tmax 99.6F. Grew GNR, acinetobacter (not baumannii) in sputum cx. Blood cx 6/1 grew GPCs, sputum cx 6/2 grew non-lactose fermenting GNR. ID consulted 6/3 for persistent fevers. Currently afebrile.   -vancomycin/zosyn x5 days (5/17-5/22) for asp PNA   -discontinued meropenem changed to unasyn 5/29  -unasyn discontinued and added vanc/zosyn 6/4  -discontinued vanc 6/7  -continues on zosyn; follow up w/ ID to confirm if still needed   -CT CAP negative for other source of infection   -scheduled acetaminophen changed to PRN   -C diff culture 6/1 negative      -monitor for signs of infection given lines and leukocytosis   Hematology and Oncology: Thrombocytosis, resolved    Plts 389 today. Receiving ASA/ticagrelor for NAZIA. Transfused 1 unit PRBCs 5/21. Hgb 8.5 today. No signs of active bleeding.    -right inguinal hematoma on US at old ECMO cannulae insertion site, stable   -peripheral smear showed normal platelet morphology   -daily CBC   -transfuse for Hgb<7   -DVT PPX: subcutaneous heparin    Endocrinology: No known medical history. BG elevated.  -not requiring insulin gtt  -HgbA1c 5.2   Lines:         R PICC line May 20, 2020 Restraint: mitts    Current lines are required for patient management       Family update by me today: Yes     Code Status: Full code     The pt was discussed and evaluated  "with Dr. Alanis, attending physician, who agrees with the assessment and plan above.     Kala Chiang, DNP APRN CNP          Objective     /71   Pulse 107   Temp 98.7  F (37.1  C) (Axillary)   Resp 15   Ht 1.676 m (5' 6\")   Wt 94.1 kg (207 lb 7.3 oz)   SpO2 100%   BMI 33.48 kg/m    Temp:  [98.2  F (36.8  C)-99.6  F (37.6  C)] 98.7  F (37.1  C)  Pulse:  [] 107  Heart Rate:  [] 113  Resp:  [12-27] 15  BP: (100-137)/(67-93) 112/71  MAP:  [74 mmHg-96 mmHg] 92 mmHg  Arterial Line BP: ()/(58-85) 109/85  FiO2 (%):  [35 %-40 %] 35 %  SpO2:  [91 %-100 %] 100 %  Wt Readings from Last 2 Encounters:   06/09/20 94.1 kg (207 lb 7.3 oz)     I/O last 3 completed shifts:  In: 1771 [I.V.:996; NG/GT:425]  Out: 3000 [Urine:2300; Emesis/NG output:100; Stool:600]      GENERAL APPEARANCE: S/p PEG placement. NAD.  HEENT: No icterus, PERRL 3 mm, trach in place.  CARDIOVASCULAR: sinus tachycardia, normal S1 and S2, no S3 or S4 and no murmur, click or rub. Normal PMI. Pulses dopplerable.  RESP: Clear bilaterally. Mechanical ventilation.    GASTRO: Soft, bowel sounds hyperactive. PEG and rectal tube in place.   GENITOURINARY: Condom cath in place.  EXTREMITIES: Warm, no edema.    NEURO: Pupils equal and reactive, 3 mm. Moving extremities and opening eyes although not following commands. Withdraws to pain.    INTEGUMENTARY: No rashes. Line sites CDI. Bilaterally groin sites CDI; right groin ecchymotic, incision intact with area of firmness on medial aspect of incision.  LINES/TUBES/DRAINS: PICC. Trach. PEG.           Data:     Vent Settings:  Resp: 15 SpO2: 100 % O2 Device: Mechanical Ventilator      Arterial Blood Gas:   Recent Labs   Lab 06/06/20  0228   PH 7.44   PCO2 35   PO2 154*   HCO3 24   O2PER 30.0       Vitals:    06/07/20 0400 06/08/20 0400 06/09/20 0600   Weight: 92 kg (202 lb 13.2 oz) 93.7 kg (206 lb 9.1 oz) 94.1 kg (207 lb 7.3 oz)   I/O last 3 completed shifts:  In: 1771 [I.V.:996; " NG/GT:425]  Out: 3000 [Urine:2300; Emesis/NG output:100; Stool:600]  Recent Labs   Lab 06/10/20  0348 20  0720    135   POTASSIUM 3.4 3.6   CHLORIDE 106 103   CO2 29 28   ANIONGAP 6 4   GLC 96 127*   BUN 20 20   CR 0.83 0.91   TEN 8.7 8.8     No components found for: URINE   Recent Labs   Lab 20  0720 20  0416   AST 52* 57*   * 120*   BILITOTAL 0.5 0.6   ALBUMIN 2.9* 3.0*   PROTTOTAL 7.6 7.9   ALKPHOS 148 127     Temp: 98.7  F (37.1  C) Temp src: AxillaryTemp  Min: 98.2  F (36.8  C)  Max: 99.6  F (37.6  C)   Recent Labs   Lab 06/10/20  03420  0410 20  0330 20  0333 20  0200   WBC 11.6* 10.8 9.7 10.4 12.3*   HGB 8.5* 7.6* 7.9* 8.2* 8.0*   HCT 27.2* 26.4* 26.2* 26.9* 26.0*   MCV 95 103* 100 99 98   RDW 14.4 14.6 14.8 14.7 14.6    337 412 448 474*     Recent Labs   Lab 20  0330   INR 1.10     Recent Labs   Lab 06/10/20  0348 20  0720 20  0410 20  0330 20  0333   GLC 96 127* Canceled, Test credited 94 112*       All imaging personally reviewed:  Recent Results (from the past 24 hour(s))   Echocardiogram Complete    Narrative    [id number]  [id number]  [id number]  [id number]^GRIS^YOSHI           [facility]  Echocardiography Laboratory  [address] [place] [id number]     Name: FAINA FORRESTER  MRN: [id number]  : 1980  Study Date: 2020 08:16 AM  Age: 39 yrs  Gender: Male  Patient Location: [facility]  Reason For Study: Cardiac Arrest  Ordering Physician: YOSHI LANDRY  Performed By: Denisse Johnson RDCS     BSA: 2.1 m2  Height: 66 in  Weight: 231 lb  BP: 132/58 mmHg  _____________________________________________________________________________  __        Procedure  Complete Portable Echo Adult. Technically difficult study.Extremely poor  acoustic windows.  _____________________________________________________________________________  __        Interpretation Summary  VA ECMO at 3.7L/min.  Technically difficult study. Extremely poor acoustic  windows.  Severely (EF <30%) reduced left ventricular function on extremely limited  views.  The right ventricle cannot be assessed.  No pericardial effusion is present.  Previous study not available for comparison.  _____________________________________________________________________________  __        Left Ventricle  Left ventricular wall thickness cannot evaluate. Left ventricular size is  normal. Severely (EF <30%) reduced left ventricular function is present. Left  ventricular diastolic function is not assessable. Severe diffuse hypokinesis  is present.     Right Ventricle  The right ventricle cannot be assessed. Right ventricular function cannot be  assessed due to poor image quality.     Mitral Valve  The mitral valve cannot be assessed.        Aortic Valve  The aortic valve opens with each cardiac cycle. On Doppler interrogation,  there is no significant stenosis or regurgitation.     Tricuspid Valve  The tricuspid valve cannot be assessed. Pulmonary artery systolic pressure  cannot be assessed.     Pulmonic Valve  The pulmonic valve cannot be assessed.     Vessels  The aorta root cannot be assessed. The thoracic aorta cannot be assessed.  Venous ECMO cannula is visualized traversing the IVC.     Pericardium  No pericardial effusion is present.     Compared to Previous Study  Previous study not available for comparison.     _____________________________________________________________________________  __                       Report approved by: Ashlee Izquierdo 05/18/2020 09:18 AM                 _____________________________________________________________________________  __                Medications     Current Facility-Administered Medications   Medication     norepinephrine (LEVOPHED) 16-0.9 MG/250ML-% 16 mg in  mL infusion     0.9% sodium chloride BOLUS     0.9% sodium chloride BOLUS     acetaminophen (TYLENOL) tablet 650 mg      acetylcysteine (MUCOMYST) 10 % nebulizer solution 4 mL     artificial tears ophthalmic ointment     aspirin (ASA) chewable tablet 81 mg     calcium chloride in  mL intermittent infusion 1 g     chlorhexidine (PERIDEX) 0.12 % solution 15 mL     dextrose 10% infusion     glucose gel 15-30 g    Or     dextrose 50 % injection 25-50 mL    Or     glucagon injection 1 mg     heparin ANTICOAGULANT injection 5,000 Units     heparin lock flush 10 UNIT/ML injection 5-10 mL     heparin lock flush 10 UNIT/ML injection 5-10 mL     HOLD MEDICATION (one time)     ipratropium - albuterol 0.5 mg/2.5 mg/3 mL (DUONEB) neb solution 3 mL     levalbuterol (XOPENEX) neb solution 1.25 mg     levETIRAcetam (KEPPRA) solution 1,000 mg     lidocaine (LMX4) cream     lidocaine (XYLOCAINE) 2 % external gel     lidocaine 1 % 0.1-1 mL     lisinopril (ZESTRIL) tablet 5 mg     magnesium sulfate 2 g in water intermittent infusion     magnesium sulfate 4 g in 100 mL sterile water (premade)     melatonin tablet 10 mg     metoprolol tartrate (LOPRESSOR) half-tab 25 mg     midazolam (VERSED) injection 1-2 mg     multivitamins w/minerals (CERTAVITE) liquid 15 mL     naloxone (NARCAN) injection 0.1-0.4 mg     oxyCODONE (ROXICODONE) tablet 5 mg     pantoprazole (PROTONIX) 2 mg/mL suspension 40 mg     piperacillin-tazobactam (ZOSYN) 4.5 g vial to attach to  mL bag     potassium chloride (KLOR-CON) Packet 20-40 mEq     potassium chloride 10 mEq in 100 mL intermittent infusion with 10 mg lidocaine     potassium chloride 10 mEq in 100 mL sterile water intermittent infusion (premix)     potassium chloride 20 mEq in 50 mL intermittent infusion     potassium chloride ER (KLOR-CON M) CR tablet 20-40 mEq     QUEtiapine (SEROquel) tablet 25 mg     QUEtiapine (SEROquel) tablet 50 mg     senna-docusate (SENOKOT-S/PERICOLACE) 8.6-50 MG per tablet 1 tablet     sodium chloride (NEBUSAL) 3 % neb solution 3 mL     sodium chloride (PF) 0.9% PF flush 10-20 mL      sodium chloride (PF) 0.9% PF flush 3 mL     sodium chloride (PF) 0.9% PF flush 3 mL     sodium phosphate 10 mmol in D5W intermittent infusion     sodium phosphate 15 mmol in D5W intermittent infusion     sodium phosphate 20 mmol in D5W intermittent infusion     sodium phosphate 25 mmol in D5W intermittent infusion     ticagrelor (BRILINTA) tablet 90 mg     vitamin A-D & C drops (TRI-VI-SOL) drops 13 mL     zinc sulfate solution 220 mg                    I have seen and examined the patient with the CSI team. I agree with the assessment and plan of the note above.I have reviewed pertinent labs.     Jefferson Alanis MD  Interventional Cardiology  Pager: [phone]

## 2020-06-11 LAB
ANION GAP SERPL CALCULATED.3IONS-SCNC: 5 MMOL/L (ref 3–14)
BUN SERPL-MCNC: 24 MG/DL (ref 7–30)
CALCIUM SERPL-MCNC: 9.3 MG/DL (ref 8.5–10.1)
CHLORIDE SERPL-SCNC: 107 MMOL/L (ref 94–109)
CO2 SERPL-SCNC: 26 MMOL/L (ref 20–32)
CREAT SERPL-MCNC: 0.81 MG/DL (ref 0.66–1.25)
ERYTHROCYTE [DISTWIDTH] IN BLOOD BY AUTOMATED COUNT: 14.3 % (ref 10–15)
GFR SERPL CREATININE-BSD FRML MDRD: >90 ML/MIN/{1.73_M2}
GLUCOSE BLDC GLUCOMTR-MCNC: 104 MG/DL (ref 70–99)
GLUCOSE SERPL-MCNC: 109 MG/DL (ref 70–99)
HCT VFR BLD AUTO: 30.8 % (ref 40–53)
HGB BLD-MCNC: 9.6 G/DL (ref 13.3–17.7)
MCH RBC QN AUTO: 29.8 PG (ref 26.5–33)
MCHC RBC AUTO-ENTMCNC: 31.2 G/DL (ref 31.5–36.5)
MCV RBC AUTO: 96 FL (ref 78–100)
PLATELET # BLD AUTO: 426 10E9/L (ref 150–450)
POTASSIUM SERPL-SCNC: 3.7 MMOL/L (ref 3.4–5.3)
RBC # BLD AUTO: 3.22 10E12/L (ref 4.4–5.9)
SODIUM SERPL-SCNC: 139 MMOL/L (ref 133–144)
WBC # BLD AUTO: 13.4 10E9/L (ref 4–11)

## 2020-06-11 PROCEDURE — 25000132 ZZH RX MED GY IP 250 OP 250 PS 637: Performed by: INTERNAL MEDICINE

## 2020-06-11 PROCEDURE — 99233 SBSQ HOSP IP/OBS HIGH 50: CPT | Performed by: INTERNAL MEDICINE

## 2020-06-11 PROCEDURE — 27210437 ZZH NUTRITION PRODUCT SEMIELEM INTERMED LITER

## 2020-06-11 PROCEDURE — 40000275 ZZH STATISTIC RCP TIME EA 10 MIN

## 2020-06-11 PROCEDURE — 25000132 ZZH RX MED GY IP 250 OP 250 PS 637: Performed by: NURSE PRACTITIONER

## 2020-06-11 PROCEDURE — 25000128 H RX IP 250 OP 636: Performed by: NURSE PRACTITIONER

## 2020-06-11 PROCEDURE — 00000146 ZZHCL STATISTIC GLUCOSE BY METER IP

## 2020-06-11 PROCEDURE — 94003 VENT MGMT INPAT SUBQ DAY: CPT

## 2020-06-11 PROCEDURE — 25000125 ZZHC RX 250: Performed by: NURSE PRACTITIONER

## 2020-06-11 PROCEDURE — 85027 COMPLETE CBC AUTOMATED: CPT | Performed by: STUDENT IN AN ORGANIZED HEALTH CARE EDUCATION/TRAINING PROGRAM

## 2020-06-11 PROCEDURE — 80048 BASIC METABOLIC PNL TOTAL CA: CPT | Performed by: STUDENT IN AN ORGANIZED HEALTH CARE EDUCATION/TRAINING PROGRAM

## 2020-06-11 PROCEDURE — 20000004 ZZH R&B ICU UMMC

## 2020-06-11 PROCEDURE — 25000132 ZZH RX MED GY IP 250 OP 250 PS 637: Performed by: STUDENT IN AN ORGANIZED HEALTH CARE EDUCATION/TRAINING PROGRAM

## 2020-06-11 PROCEDURE — 40000014 ZZH STATISTIC ARTERIAL MONITORING DAILY

## 2020-06-11 PROCEDURE — 40000983 ZZH STATISTIC HFNC ADULT NON-CPAP

## 2020-06-11 PROCEDURE — 25000128 H RX IP 250 OP 636: Performed by: STUDENT IN AN ORGANIZED HEALTH CARE EDUCATION/TRAINING PROGRAM

## 2020-06-11 PROCEDURE — 25000132 ZZH RX MED GY IP 250 OP 250 PS 637

## 2020-06-11 RX ORDER — OXYCODONE HYDROCHLORIDE 5 MG/1
10 TABLET ORAL
Status: COMPLETED | OUTPATIENT
Start: 2020-06-11 | End: 2020-06-11

## 2020-06-11 RX ORDER — FUROSEMIDE 20 MG
20 TABLET ORAL DAILY
Status: DISCONTINUED | OUTPATIENT
Start: 2020-06-11 | End: 2020-06-18

## 2020-06-11 RX ORDER — CEPHALEXIN 250 MG/5ML
500 POWDER, FOR SUSPENSION ORAL EVERY 6 HOURS
Status: DISCONTINUED | OUTPATIENT
Start: 2020-06-11 | End: 2020-06-13

## 2020-06-11 RX ORDER — OXYCODONE HYDROCHLORIDE 5 MG/1
5 TABLET ORAL EVERY 6 HOURS PRN
Status: DISCONTINUED | OUTPATIENT
Start: 2020-06-11 | End: 2020-06-12

## 2020-06-11 RX ORDER — DEXMEDETOMIDINE HYDROCHLORIDE 4 UG/ML
0.2-0.7 INJECTION, SOLUTION INTRAVENOUS CONTINUOUS
Status: DISCONTINUED | OUTPATIENT
Start: 2020-06-11 | End: 2020-06-11

## 2020-06-11 RX ORDER — ONDANSETRON 2 MG/ML
4 INJECTION INTRAMUSCULAR; INTRAVENOUS EVERY 6 HOURS PRN
Status: DISCONTINUED | OUTPATIENT
Start: 2020-06-11 | End: 2020-06-12

## 2020-06-11 RX ORDER — FENTANYL CITRATE 50 UG/ML
50-100 INJECTION, SOLUTION INTRAMUSCULAR; INTRAVENOUS ONCE
Status: COMPLETED | OUTPATIENT
Start: 2020-06-11 | End: 2020-06-11

## 2020-06-11 RX ADMIN — FENTANYL CITRATE 100 MCG: 50 INJECTION, SOLUTION INTRAMUSCULAR; INTRAVENOUS at 13:37

## 2020-06-11 RX ADMIN — CHLORHEXIDINE GLUCONATE 0.12% ORAL RINSE 15 ML: 1.2 LIQUID ORAL at 11:56

## 2020-06-11 RX ADMIN — ACETAMINOPHEN 650 MG: 325 TABLET, FILM COATED ORAL at 07:27

## 2020-06-11 RX ADMIN — ACETAMINOPHEN 650 MG: 325 TABLET, FILM COATED ORAL at 13:51

## 2020-06-11 RX ADMIN — CHLORHEXIDINE GLUCONATE 0.12% ORAL RINSE 15 ML: 1.2 LIQUID ORAL at 07:25

## 2020-06-11 RX ADMIN — CHLORHEXIDINE GLUCONATE 0.12% ORAL RINSE 15 ML: 1.2 LIQUID ORAL at 16:04

## 2020-06-11 RX ADMIN — Medication 25 MG: at 07:27

## 2020-06-11 RX ADMIN — LEVETIRACETAM 1000 MG: 100 SOLUTION ORAL at 20:50

## 2020-06-11 RX ADMIN — ONDANSETRON 4 MG: 2 INJECTION INTRAMUSCULAR; INTRAVENOUS at 18:32

## 2020-06-11 RX ADMIN — GUAIFENESIN 10 ML: 100 SOLUTION ORAL at 20:45

## 2020-06-11 RX ADMIN — MULTIVITAMIN 15 ML: LIQUID ORAL at 07:27

## 2020-06-11 RX ADMIN — Medication 13 ML: at 20:50

## 2020-06-11 RX ADMIN — TICAGRELOR 90 MG: 90 TABLET ORAL at 07:27

## 2020-06-11 RX ADMIN — Medication 5 ML: at 20:47

## 2020-06-11 RX ADMIN — CHLORHEXIDINE GLUCONATE 0.12% ORAL RINSE 15 ML: 1.2 LIQUID ORAL at 20:45

## 2020-06-11 RX ADMIN — QUETIAPINE FUMARATE 50 MG: 50 TABLET ORAL at 21:37

## 2020-06-11 RX ADMIN — OXYCODONE HYDROCHLORIDE 5 MG: 5 TABLET ORAL at 07:27

## 2020-06-11 RX ADMIN — HEPARIN SODIUM 5000 UNITS: 5000 INJECTION, SOLUTION INTRAVENOUS; SUBCUTANEOUS at 00:06

## 2020-06-11 RX ADMIN — TICAGRELOR 90 MG: 90 TABLET ORAL at 20:45

## 2020-06-11 RX ADMIN — Medication 40 MG: at 20:49

## 2020-06-11 RX ADMIN — MELATONIN TAB 3 MG 10 MG: 3 TAB at 21:37

## 2020-06-11 RX ADMIN — QUETIAPINE FUMARATE 25 MG: 25 TABLET ORAL at 07:27

## 2020-06-11 RX ADMIN — Medication 13 ML: at 07:25

## 2020-06-11 RX ADMIN — LEVETIRACETAM 1000 MG: 100 SOLUTION ORAL at 07:25

## 2020-06-11 RX ADMIN — LISINOPRIL 5 MG: 5 TABLET ORAL at 07:27

## 2020-06-11 RX ADMIN — Medication 25 MG: at 20:45

## 2020-06-11 RX ADMIN — ASPIRIN 81 MG CHEWABLE TABLET 81 MG: 81 TABLET CHEWABLE at 07:27

## 2020-06-11 RX ADMIN — OXYCODONE HYDROCHLORIDE 5 MG: 5 TABLET ORAL at 13:51

## 2020-06-11 RX ADMIN — Medication 40 MG: at 07:25

## 2020-06-11 RX ADMIN — CEPHALEXIN 500 MG: 250 POWDER, FOR SUSPENSION ORAL at 21:37

## 2020-06-11 RX ADMIN — FUROSEMIDE 20 MG: 20 TABLET ORAL at 08:42

## 2020-06-11 RX ADMIN — OXYCODONE HYDROCHLORIDE 5 MG: 5 TABLET ORAL at 21:37

## 2020-06-11 RX ADMIN — POTASSIUM CHLORIDE 20 MEQ: 1.5 POWDER, FOR SOLUTION ORAL at 05:26

## 2020-06-11 RX ADMIN — ATORVASTATIN CALCIUM 10 MG: 10 TABLET, FILM COATED ORAL at 20:45

## 2020-06-11 RX ADMIN — OXYCODONE HYDROCHLORIDE 10 MG: 5 TABLET ORAL at 03:10

## 2020-06-11 RX ADMIN — Medication 220 MG: at 07:28

## 2020-06-11 RX ADMIN — ONDANSETRON 4 MG: 2 INJECTION INTRAMUSCULAR; INTRAVENOUS at 13:37

## 2020-06-11 RX ADMIN — HEPARIN SODIUM 5000 UNITS: 5000 INJECTION, SOLUTION INTRAVENOUS; SUBCUTANEOUS at 16:04

## 2020-06-11 RX ADMIN — HEPARIN SODIUM 5000 UNITS: 5000 INJECTION, SOLUTION INTRAVENOUS; SUBCUTANEOUS at 07:28

## 2020-06-11 ASSESSMENT — ACTIVITIES OF DAILY LIVING (ADL)
ADLS_ACUITY_SCORE: 18
ADLS_ACUITY_SCORE: 22
ADLS_ACUITY_SCORE: 22
ADLS_ACUITY_SCORE: 18
ADLS_ACUITY_SCORE: 22
ADLS_ACUITY_SCORE: 18

## 2020-06-11 ASSESSMENT — MIFFLIN-ST. JEOR: SCORE: 1706.75

## 2020-06-11 NOTE — PLAN OF CARE
ICU End of Shift Summary. See flowsheets for vital signs and detailed assessment.    Changes this shift: Continues to be restless-prn tylenol & oxycodone given. Pt had emesis episode after coughing fit-team notified & zofran & fentanyl given. FiO2 decreased to 21%. 20 mg lasix given-pt kicking off condom cath, depends placed w/ multiple saturations. R groin dressing oozing, team notified-sutures removed & PO keflex to be started. Pt's wallet retrieved from security and given to pt's father (POA).    Plan: Notify team if pt has another episode of emesis & will hold TF. Remove PICC line prior to transfer to [facility]. Waiting for an open bed at [facility]. Continue POC.

## 2020-06-11 NOTE — PROGRESS NOTES
Care Coordinator - Discharge Planning    Admission Date/Time:  5/17/2020  Attending MD:  Alexi Alanis MD     Data  Date of initial CC assessment:  5/22/20  Chart reviewed, discussed with interdisciplinary team.   Patient was admitted for:   1. ST elevation MI (STEMI) (H)    2. Cardiac arrest (H)    3. Cardiogenic shock (H)    4. Posturing episode         Assessment   Previous referral completed to [facility] for LTACH needs. CSI team has updated me that trach/peg were placed and pt is stable for transfer to [facility] today.     Coordination of Care:  [facility]. He has updated me that unfortunately, there is not a bed available for transfer today but pt is on transfer list once a bed opens up. Delay entered in UR notes.     Plan  Anticipated Discharge Date:  TBD  Anticipated Discharge Plan:  [facility]     Cl Barragan RN, BSN  ICU Care Coordinator  Pager: [phone]  Phone:  [phone]

## 2020-06-11 NOTE — PLAN OF CARE
Major Shift Events:  Patient neuros unchanged, restless all night despite scheduled and one time PRN oxycodone doses. Shiley 6.0, trach dome 30% FiO2. Cough with thick bloody streaked secretions. Condom catheter in place with adequate urine output, rectal tube removed.    Plan: Patient to transfer to [facility] when bed becomes available.    For vital signs and complete assessments, please see documentation flowsheets.

## 2020-06-11 NOTE — PROGRESS NOTES
Cardiology Progress Note  Scot Bishop MRN: [id number]  Age: 39 year old, : 1980  Date: 2020            Assessment and Plan:     Scot Bishop is a 39 year old male who was admitted on 2020 for cardiac arrest. Pt reportedly had chest pain that started on 20. He was using Drano on his pipes at home and did not initially seek medical attention for CP and SOB since it said on the box that Drano can cause those symptoms. He took a shower and had syncopal episode after coming out of the shower. EMS was called; initial rhythm asystole. With chest compressions pt eventually had PEA and then eventually had VF in the field. After multiple cycles of epinephrine had ROSC. He was intubated in the field.   Pt was brought to [facility] ED where he regained a pulse and was brought to the Cath Lab for coronary angiogram. Initially, BP was 90s/60s as he was being transported from ED but he had recurrent cardiac arrest on arrival to Cath Lab. ECG showed ST elevation in aVR. He was promptly placed on VA ECMO. He had thrombotic occlusion of proximal LAD and underwent successful aspiration thrombectomy and PCI w/ NAZIA of proximal and mid LAD.     Interval events: echo yesterday showed EF 36% w/ LAD territory akinesis. Tolerating trach dome 30%. Moving all extremities although nothing to command. Afebrile. Episode of vomiting today, possible post-tussive given forceful cough.   Nursing noted serosanguinous drainage from lateral aspect of ECMO decannulation site. Site otherwise CDI without signs of infection. Sutures removed and drainage/wound assessed by CVTS team. Keep wound dry/clean and will start PO keflex per their recs.    Today's plan:   -add lasix 20 mg daily   -change melatonin from PRN to scheduled   -continue trach dome as able   -change scheduled oxycodone to PRN   -discontinue zosyn per ID   -discontinue PICC line   -PO keflex x 7-10 days w/ close monitoring of right groin  incision site   -awaiting transfer to [facility]     Neurology: Hypoxic brain injury   Initial CT head negative. Cooled to 34 degrees on arrival; rewarmed 5/19 AM. EEG 5/25 showed severe diffuse encephalopathy without seizures or epileptiform discharges.   Repeat CTH 5/25: multifocal acute/subacute cerebral infarcts   Brain MRI 5/26: diffuse acute/subacute infarcts in bilat cerebral hemispheres, corpus callosum, and periventricular white matter c/w evolving diffuse hypoxic ischemic brain injury   Episode of LUE posturing and rhythmic tremors overnight on 5/27. EEG restarted 5/28, now off.  CTH repeated 6/3 for lack of LLE movement - negative for new stroke or other acute changes.   -s/p trach placement 6/8  -seroquel 25 mg qAM and 50 mg qPM    -oxycodone PRN   -discontinue fentanyl boluses   -precedex if needed for agitation   -moving all extremities although nothing to command    Cardiovascular / Hemodynamics: Refractory VF arrest    S/p NAZIA to proximal-mid LAD  Sinus tachycardia   Peripheral VA ECMO inserted for cardiac arrest. LA 16 initially. Suspect ongoing tachycardia d/t evolving MI and post-arrest state. ECMO decannulation at bedside on 5/19; IABP discontinued 5/20. 23 second run on VT overnight on 5/27.   TTE 6/10/20: EF 36% w/ LAD territory akinesis, RV normal   EKG 6/8: sinus tachycardia, QTc 449.   -continue ASA 81mg daily and ticagrelor 90mg BID  -continue metoprolol tartrate 25 mg BID   -continue lisinopril 5 mg   -continue atorvastatin 10 mg   -add lasix 20 mg daily per FT    Pulmonary: Acute hypoxic respiratory failure  COVID negative  Admission CT chest showed bilateral consolidations c/w aspiration PNA. Had thick secretions that required bagging by RT on 5/20. Bronchoscopy 5/21. Increasing oxygen requirements and pt-vent dyssynchrony on 5/22. Pulmonary re-consulted, infectious work up, pt sedated. Significant pressure injury of tongue w/ splitting of tongue noted by [facility] nurse today. No active  bleeding.    Vent settings this AM: PS 7/5 30%  ABG 5/31: 7.48/34/116/26   CXR 6/3: mild pulmonary edema    -s/p trach placement 6/8   -has been tolerating long periods of pressure support   -mucomyst and albuterol nebs PRN   -growing GNR in sputum; unasyn d/c'd, added vanc/zosyn 6/4 per ID  -vanc d/c'd 6/7 per ID  -discontinue zosyn per ID   -CXR as needed   -ABGs PRN   -ENT consult 5/27 for pressure injury of tongue; plan for follow up w/ them as outpatient for possible surgical repair of tongue    GI and Nutrition: Shock liver  GIB, resolved   , AST 52 on 6/9. T bili 0.5.    -s/p PEG placement in IR   -tolerating TF via PEG     -loose stools; added PRN bowel regimen given oxycodone   -C diff culture 6/1 negative    -GI Prophylaxis: Protonix BID for UGIB   Renal, Fluid and Electrolytes: Acute kidney injury, resolved    Cr improved to 0.81 this AM. 2L UOP and net even yesterday. Some urine unmeasured d/t condom cath. Na 139 today.   -continue FWF 30 mL q4hrs   -monitor I/O  -maintain K>3.8 and Mg>2    Infectious Disease: Aspiration pneumonia  Leukocytosis  WBC 13.4, tmax 99.5F. Grew GNR, acinetobacter (not baumannii) in sputum cx. Blood cx 6/1 grew GPCs, sputum cx 6/2 grew non-lactose fermenting GNR. ID consulted 6/3 for persistent fevers. Currently afebrile.   -vancomycin/zosyn x5 days (5/17-5/22) for asp PNA   -discontinued meropenem changed to unasyn 5/29  -unasyn discontinued and added vanc/zosyn 6/4  -discontinued vanc 6/7  -discontinue zosyn per ID   -CT CAP negative for other source of infection   -scheduled acetaminophen changed to PRN   -C diff culture 6/1 negative      -monitor for signs of infection given lines and leukocytosis   Hematology and Oncology: Thrombocytosis, resolved    Plts 426 today. Receiving ASA/ticagrelor for NAZIA. Transfused 1 unit PRBCs 5/21. Hgb 9.6 today. No signs of active bleeding.    -right inguinal hematoma on US at old ECMO cannulae insertion site, stable   -peripheral  "smear showed normal platelet morphology   -daily CBC   -transfuse for Hgb<7   -DVT PPX: subcutaneous heparin    Endocrinology: No known medical history. BG elevated.  -not requiring insulin gtt  -HgbA1c 5.2   Lines:         R PICC line May 20, 2020 Restraint: not needed    Current lines are required for patient management       Family update by me today: Yes     Code Status: Full code     The pt was discussed and evaluated with Dr. Alanis, attending physician, who agrees with the assessment and plan above.     Kala Chiang, DNP APRN CNP          Objective     /70   Pulse 118   Temp 99.1  F (37.3  C) (Axillary)   Resp 20   Ht 1.676 m (5' 6\")   Wt 85.4 kg (188 lb 4.4 oz)   SpO2 98%   BMI 30.39 kg/m    Temp:  [97  F (36.1  C)-99.6  F (37.6  C)] 99.1  F (37.3  C)  Pulse:  [108-126] 118  Heart Rate:  [] 120  Resp:  [15-24] 20  BP: ()/(62-78) 100/70  FiO2 (%):  [21 %-30 %] 21 %  SpO2:  [96 %-100 %] 98 %  Wt Readings from Last 2 Encounters:   06/11/20 85.4 kg (188 lb 4.4 oz)     I/O last 3 completed shifts:  In: 1990 [I.V.:130; NG/GT:420]  Out: 1805 [Urine:1555; Stool:250]      GENERAL APPEARANCE: S/p PEG placement. NAD.  HEENT: No icterus, PERRL 3 mm, trach in place.  CARDIOVASCULAR: sinus tachycardia, normal S1 and S2, no S3 or S4 and no murmur, click or rub. Normal PMI. Pulses dopplerable.  RESP: Clear bilaterally. Trach dome.    GASTRO: Soft, bowel sounds hyperactive. PEG and rectal tube in place.   GENITOURINARY: Condom cath in place.  EXTREMITIES: Warm, no edema.    NEURO: Pupils equal and reactive, 3 mm. Moving extremities and opening eyes although not following commands. Withdraws to pain.    INTEGUMENTARY: No rashes. Line sites CDI. Bilaterally groin sites CDI; right groin ecchymotic, incision intact with area of firmness on medial aspect of incision.  LINES/TUBES/DRAINS: PICC. Trach. PEG.           Data:     Vent Settings:  Resp: 20 SpO2: 98 % O2 Device: Trach dome Oxygen " Delivery: Other (Comments)(30 Lpm)    Arterial Blood Gas:   Recent Labs   Lab 20   PH 7.44   PCO2 35   PO2 154*   HCO3 24   O2PER 30.0       Vitals:    20 0400 20 0600 20 040   Weight: 93.7 kg (206 lb 9.1 oz) 94.1 kg (207 lb 7.3 oz) 85.4 kg (188 lb 4.4 oz)   I/O last 3 completed shifts:  In:  [I.V.:130; NG/GT:420]  Out:  [Urine:1555; Stool:250]  Recent Labs   Lab 06/11/20  0402 06/10/20  0348    140   POTASSIUM 3.7 3.4   CHLORIDE 107 106   CO2 26 29   ANIONGAP 5 6   * 96   BUN 24 20   CR 0.81 0.83   TEN 9.3 8.7     No components found for: URINE   Recent Labs   Lab 20   AST 52*   *   BILITOTAL 0.5   ALBUMIN 2.9*   PROTTOTAL 7.6   ALKPHOS 148     Temp: 99.1  F (37.3  C) Temp src: AxillaryTemp  Min: 97  F (36.1  C)  Max: 99.6  F (37.6  C)   Recent Labs   Lab 06/11/20  0402 06/10/20  0348 06/09/20  0410 06/08/20  03320  0333   WBC 13.4* 11.6* 10.8 9.7 10.4   HGB 9.6* 8.5* 7.6* 7.9* 8.2*   HCT 30.8* 27.2* 26.4* 26.2* 26.9*   MCV 96 95 103* 100 99   RDW 14.3 14.4 14.6 14.8 14.7    389 337 412 448     Recent Labs   Lab 20  0330   INR 1.10     Recent Labs   Lab 06/11/20  0402 06/10/20  0348 06/09/20  0720  04120  0330   * 96 127* Canceled, Test credited 94       All imaging personally reviewed:  Recent Results (from the past 24 hour(s))   Echocardiogram Complete    Narrative    [id number]  [id number]  [id number]  [id number]^GRIS^YOSHI           [facility],[place]  Echocardiography Laboratory  [address] [place] [id number]     Name: FAINA FORRESTER  MRN: [id number]  : 1980  Study Date: 2020 08:16 AM  Age: 39 yrs  Gender: Male  Patient Location: [facility]  Reason For Study: Cardiac Arrest  Ordering Physician: YOSHI LANDRY  Performed By: Denisse Johnson RDCS     BSA: 2.1 m2  Height: 66 in  Weight: 231 lb  BP: 132/58  mmHg  _____________________________________________________________________________  __        Procedure  Complete Portable Echo Adult. Technically difficult study.Extremely poor  acoustic windows.  _____________________________________________________________________________  __        Interpretation Summary  VA ECMO at 3.7L/min. Technically difficult study. Extremely poor acoustic  windows.  Severely (EF <30%) reduced left ventricular function on extremely limited  views.  The right ventricle cannot be assessed.  No pericardial effusion is present.  Previous study not available for comparison.  _____________________________________________________________________________  __        Left Ventricle  Left ventricular wall thickness cannot evaluate. Left ventricular size is  normal. Severely (EF <30%) reduced left ventricular function is present. Left  ventricular diastolic function is not assessable. Severe diffuse hypokinesis  is present.     Right Ventricle  The right ventricle cannot be assessed. Right ventricular function cannot be  assessed due to poor image quality.     Mitral Valve  The mitral valve cannot be assessed.        Aortic Valve  The aortic valve opens with each cardiac cycle. On Doppler interrogation,  there is no significant stenosis or regurgitation.     Tricuspid Valve  The tricuspid valve cannot be assessed. Pulmonary artery systolic pressure  cannot be assessed.     Pulmonic Valve  The pulmonic valve cannot be assessed.     Vessels  The aorta root cannot be assessed. The thoracic aorta cannot be assessed.  Venous ECMO cannula is visualized traversing the IVC.     Pericardium  No pericardial effusion is present.     Compared to Previous Study  Previous study not available for comparison.     _____________________________________________________________________________  __                       Report approved by: Ashlee Izquierdo 05/18/2020 09:18 AM                  _____________________________________________________________________________  __                Medications     Current Facility-Administered Medications   Medication     0.9% sodium chloride BOLUS     0.9% sodium chloride BOLUS     acetaminophen (TYLENOL) tablet 650 mg     acetylcysteine (MUCOMYST) 10 % nebulizer solution 4 mL     artificial tears ophthalmic ointment     aspirin (ASA) chewable tablet 81 mg     atorvastatin (LIPITOR) tablet 10 mg     calcium chloride in  mL intermittent infusion 1 g     chlorhexidine (PERIDEX) 0.12 % solution 15 mL     dextrose 10% infusion     glucose gel 15-30 g    Or     dextrose 50 % injection 25-50 mL    Or     glucagon injection 1 mg     furosemide (LASIX) tablet 20 mg     heparin ANTICOAGULANT injection 5,000 Units     heparin lock flush 10 UNIT/ML injection 5-10 mL     heparin lock flush 10 UNIT/ML injection 5-10 mL     HOLD MEDICATION (one time)     ipratropium - albuterol 0.5 mg/2.5 mg/3 mL (DUONEB) neb solution 3 mL     levalbuterol (XOPENEX) neb solution 0.63 mg     levETIRAcetam (KEPPRA) solution 1,000 mg     lidocaine (LMX4) cream     lidocaine (XYLOCAINE) 2 % external gel     lidocaine 1 % 0.1-1 mL     lisinopril (ZESTRIL) tablet 5 mg     magnesium sulfate 2 g in water intermittent infusion     magnesium sulfate 4 g in 100 mL sterile water (premade)     melatonin tablet 10 mg     metoprolol tartrate (LOPRESSOR) half-tab 25 mg     midazolam (VERSED) injection 1-2 mg     multivitamins w/minerals (CERTAVITE) liquid 15 mL     naloxone (NARCAN) injection 0.1-0.4 mg     ondansetron (ZOFRAN) injection 4 mg     oxyCODONE (ROXICODONE) tablet 5 mg     pantoprazole (PROTONIX) 2 mg/mL suspension 40 mg     potassium chloride (KLOR-CON) Packet 20-40 mEq     potassium chloride 10 mEq in 100 mL intermittent infusion with 10 mg lidocaine     potassium chloride 10 mEq in 100 mL sterile water intermittent infusion (premix)     potassium chloride 20 mEq in 50 mL intermittent  infusion     potassium chloride ER (KLOR-CON M) CR tablet 20-40 mEq     QUEtiapine (SEROquel) tablet 25 mg     QUEtiapine (SEROquel) tablet 50 mg     senna-docusate (SENOKOT-S/PERICOLACE) 8.6-50 MG per tablet 1 tablet     sodium chloride (NEBUSAL) 3 % neb solution 3 mL     sodium chloride (PF) 0.9% PF flush 10 mL     sodium chloride (PF) 0.9% PF flush 10-20 mL     sodium chloride (PF) 0.9% PF flush 3 mL     sodium chloride (PF) 0.9% PF flush 3 mL     sodium phosphate 10 mmol in D5W intermittent infusion     sodium phosphate 15 mmol in D5W intermittent infusion     sodium phosphate 20 mmol in D5W intermittent infusion     sodium phosphate 25 mmol in D5W intermittent infusion     ticagrelor (BRILINTA) tablet 90 mg     vitamin A-D & C drops (TRI-VI-SOL) drops 13 mL     zinc sulfate solution 220 mg                      I have seen and examined the patient with the [facility] team. I agree with the assessment and plan of the note above.I have reviewed pertinent labs.     Jefferson Alanis MD  Interventional Cardiology  Pager: [phone]

## 2020-06-11 NOTE — PROGRESS NOTES
CV Surgery  6/11/2020    Asked by primary team to assess groin wound. Patient is s/p ecmo decannulation on 5/19. CT c/a/p from 6/3 incidentally found 2 separate fluid collections in the right groin. US from 6/3 showed right groin fluid collections, may represent a hematoma, no evidence of pseudoaneurysm or AV fistula.     Incision appears clean and intact. No erythema. Sutures were removed. There is a small area on the lateral edge of the incision with a small amount of serosanguinous drainage. Hematoma is palpable under skin, with no tension on the incision.     Wound does not appear infected and is intact with the exception of the small area of serosanguinous drainage, no purulent drainage or peter blood. Would keep wound dry with BID dressing changes with dry gauze. Recommend starting keflex for skin prophylaxis for 7-10 days.   Plan is for patient to discharge to [facility] in the next couple of days. Would continue dressing changes and continue to monitor at [facility]. [facility] physicians can call CVTS with any questions or concerns with the wound, if the drainage persists or worsens or if the wound starts to have signs of infection.     Jah Giraldo PA-C  Cardiothoracic Surgery  p: [phone]

## 2020-06-12 ENCOUNTER — APPOINTMENT (OUTPATIENT)
Dept: OCCUPATIONAL THERAPY | Facility: CLINIC | Age: 40
End: 2020-06-12
Payer: MEDICAID

## 2020-06-12 ENCOUNTER — APPOINTMENT (OUTPATIENT)
Dept: ULTRASOUND IMAGING | Facility: CLINIC | Age: 40
End: 2020-06-12
Attending: NURSE PRACTITIONER
Payer: MEDICAID

## 2020-06-12 ENCOUNTER — APPOINTMENT (OUTPATIENT)
Dept: GENERAL RADIOLOGY | Facility: CLINIC | Age: 40
End: 2020-06-12
Attending: NURSE PRACTITIONER
Payer: MEDICAID

## 2020-06-12 LAB
ALBUMIN SERPL-MCNC: 3 G/DL (ref 3.4–5)
ALP SERPL-CCNC: 144 U/L (ref 40–150)
ALT SERPL W P-5'-P-CCNC: 84 U/L (ref 0–70)
ANION GAP SERPL CALCULATED.3IONS-SCNC: 6 MMOL/L (ref 3–14)
AST SERPL W P-5'-P-CCNC: 45 U/L (ref 0–45)
BILIRUB DIRECT SERPL-MCNC: 0.2 MG/DL (ref 0–0.2)
BILIRUB SERPL-MCNC: 0.5 MG/DL (ref 0.2–1.3)
BUN SERPL-MCNC: 25 MG/DL (ref 7–30)
CALCIUM SERPL-MCNC: 9.7 MG/DL (ref 8.5–10.1)
CHLORIDE SERPL-SCNC: 107 MMOL/L (ref 94–109)
CHOLEST SERPL-MCNC: 170 MG/DL
CO2 SERPL-SCNC: 27 MMOL/L (ref 20–32)
CREAT SERPL-MCNC: 0.84 MG/DL (ref 0.66–1.25)
ERYTHROCYTE [DISTWIDTH] IN BLOOD BY AUTOMATED COUNT: 14.4 % (ref 10–15)
ERYTHROCYTE [DISTWIDTH] IN BLOOD BY AUTOMATED COUNT: 14.5 % (ref 10–15)
GFR SERPL CREATININE-BSD FRML MDRD: >90 ML/MIN/{1.73_M2}
GLUCOSE BLDC GLUCOMTR-MCNC: 100 MG/DL (ref 70–99)
GLUCOSE BLDC GLUCOMTR-MCNC: 103 MG/DL (ref 70–99)
GLUCOSE BLDC GLUCOMTR-MCNC: 106 MG/DL (ref 70–99)
GLUCOSE BLDC GLUCOMTR-MCNC: 94 MG/DL (ref 70–99)
GLUCOSE SERPL-MCNC: 99 MG/DL (ref 70–99)
HCT VFR BLD AUTO: 32.9 % (ref 40–53)
HCT VFR BLD AUTO: 33.1 % (ref 40–53)
HDLC SERPL-MCNC: 25 MG/DL
HGB BLD-MCNC: 10.1 G/DL (ref 13.3–17.7)
HGB BLD-MCNC: 10.1 G/DL (ref 13.3–17.7)
INTERPRETATION ECG - MUSE: NORMAL
LACTATE BLD-SCNC: 0.7 MMOL/L (ref 0.7–2)
LDLC SERPL CALC-MCNC: 94 MG/DL
LIPASE SERPL-CCNC: 407 U/L (ref 73–393)
MCH RBC QN AUTO: 29.5 PG (ref 26.5–33)
MCH RBC QN AUTO: 29.6 PG (ref 26.5–33)
MCHC RBC AUTO-ENTMCNC: 30.5 G/DL (ref 31.5–36.5)
MCHC RBC AUTO-ENTMCNC: 30.7 G/DL (ref 31.5–36.5)
MCV RBC AUTO: 96 FL (ref 78–100)
MCV RBC AUTO: 97 FL (ref 78–100)
NONHDLC SERPL-MCNC: 145 MG/DL
PLATELET # BLD AUTO: 406 10E9/L (ref 150–450)
PLATELET # BLD AUTO: 413 10E9/L (ref 150–450)
POTASSIUM SERPL-SCNC: 3.7 MMOL/L (ref 3.4–5.3)
PROT SERPL-MCNC: 8.4 G/DL (ref 6.8–8.8)
RBC # BLD AUTO: 3.41 10E12/L (ref 4.4–5.9)
RBC # BLD AUTO: 3.42 10E12/L (ref 4.4–5.9)
SODIUM SERPL-SCNC: 140 MMOL/L (ref 133–144)
TRIGL SERPL-MCNC: 255 MG/DL
WBC # BLD AUTO: 14.4 10E9/L (ref 4–11)
WBC # BLD AUTO: 15.2 10E9/L (ref 4–11)

## 2020-06-12 PROCEDURE — 27210437 ZZH NUTRITION PRODUCT SEMIELEM INTERMED LITER

## 2020-06-12 PROCEDURE — 83690 ASSAY OF LIPASE: CPT | Performed by: STUDENT IN AN ORGANIZED HEALTH CARE EDUCATION/TRAINING PROGRAM

## 2020-06-12 PROCEDURE — 00000146 ZZHCL STATISTIC GLUCOSE BY METER IP

## 2020-06-12 PROCEDURE — 20000004 ZZH R&B ICU UMMC

## 2020-06-12 PROCEDURE — 74018 RADEX ABDOMEN 1 VIEW: CPT

## 2020-06-12 PROCEDURE — 25000132 ZZH RX MED GY IP 250 OP 250 PS 637: Performed by: INTERNAL MEDICINE

## 2020-06-12 PROCEDURE — 25000132 ZZH RX MED GY IP 250 OP 250 PS 637: Performed by: STUDENT IN AN ORGANIZED HEALTH CARE EDUCATION/TRAINING PROGRAM

## 2020-06-12 PROCEDURE — 80048 BASIC METABOLIC PNL TOTAL CA: CPT | Performed by: STUDENT IN AN ORGANIZED HEALTH CARE EDUCATION/TRAINING PROGRAM

## 2020-06-12 PROCEDURE — 83605 ASSAY OF LACTIC ACID: CPT | Performed by: NURSE PRACTITIONER

## 2020-06-12 PROCEDURE — 93005 ELECTROCARDIOGRAM TRACING: CPT

## 2020-06-12 PROCEDURE — 71045 X-RAY EXAM CHEST 1 VIEW: CPT

## 2020-06-12 PROCEDURE — 25000132 ZZH RX MED GY IP 250 OP 250 PS 637

## 2020-06-12 PROCEDURE — 80061 LIPID PANEL: CPT | Performed by: STUDENT IN AN ORGANIZED HEALTH CARE EDUCATION/TRAINING PROGRAM

## 2020-06-12 PROCEDURE — 25000132 ZZH RX MED GY IP 250 OP 250 PS 637: Performed by: NURSE PRACTITIONER

## 2020-06-12 PROCEDURE — 97110 THERAPEUTIC EXERCISES: CPT | Mod: GO

## 2020-06-12 PROCEDURE — 25000125 ZZHC RX 250: Performed by: NURSE PRACTITIONER

## 2020-06-12 PROCEDURE — 25000128 H RX IP 250 OP 636: Performed by: STUDENT IN AN ORGANIZED HEALTH CARE EDUCATION/TRAINING PROGRAM

## 2020-06-12 PROCEDURE — 85027 COMPLETE CBC AUTOMATED: CPT | Performed by: STUDENT IN AN ORGANIZED HEALTH CARE EDUCATION/TRAINING PROGRAM

## 2020-06-12 PROCEDURE — 93010 ELECTROCARDIOGRAM REPORT: CPT | Performed by: INTERNAL MEDICINE

## 2020-06-12 PROCEDURE — 99207 ZZC NO CHARGE LOS: CPT | Performed by: INTERNAL MEDICINE

## 2020-06-12 PROCEDURE — 99207 ZZC APP CREDIT; MD BILLING SHARED VISIT: CPT | Performed by: NURSE PRACTITIONER

## 2020-06-12 PROCEDURE — 99233 SBSQ HOSP IP/OBS HIGH 50: CPT | Mod: 25 | Performed by: INTERNAL MEDICINE

## 2020-06-12 PROCEDURE — 25000128 H RX IP 250 OP 636: Performed by: NURSE PRACTITIONER

## 2020-06-12 PROCEDURE — 80076 HEPATIC FUNCTION PANEL: CPT | Performed by: STUDENT IN AN ORGANIZED HEALTH CARE EDUCATION/TRAINING PROGRAM

## 2020-06-12 PROCEDURE — 76705 ECHO EXAM OF ABDOMEN: CPT

## 2020-06-12 PROCEDURE — 85027 COMPLETE CBC AUTOMATED: CPT | Performed by: NURSE PRACTITIONER

## 2020-06-12 RX ORDER — ASPIRIN 81 MG/1
81 TABLET, CHEWABLE ORAL ONCE
Status: COMPLETED | OUTPATIENT
Start: 2020-06-12 | End: 2020-06-12

## 2020-06-12 RX ORDER — ONDANSETRON HYDROCHLORIDE 4 MG/5ML
4 SOLUTION ORAL EVERY 8 HOURS PRN
Status: DISCONTINUED | OUTPATIENT
Start: 2020-06-12 | End: 2020-06-12

## 2020-06-12 RX ORDER — ONDANSETRON HYDROCHLORIDE 4 MG/5ML
4 SOLUTION ORAL EVERY 8 HOURS PRN
Qty: 100 ML | Refills: 0 | Status: CANCELLED
Start: 2020-06-12

## 2020-06-12 RX ORDER — NITROGLYCERIN 0.4 MG/1
TABLET SUBLINGUAL
Qty: 30 TABLET | Refills: 0
Start: 2020-06-12 | End: 2021-01-19

## 2020-06-12 RX ORDER — ONDANSETRON 2 MG/ML
4 INJECTION INTRAMUSCULAR; INTRAVENOUS EVERY 8 HOURS
Status: DISCONTINUED | OUTPATIENT
Start: 2020-06-12 | End: 2020-06-15

## 2020-06-12 RX ORDER — CEPHALEXIN 250 MG/5ML
500 POWDER, FOR SUSPENSION ORAL EVERY 6 HOURS
Qty: 100 ML | Refills: 0 | Status: CANCELLED
Start: 2020-06-12

## 2020-06-12 RX ADMIN — TICAGRELOR 90 MG: 90 TABLET ORAL at 14:24

## 2020-06-12 RX ADMIN — FUROSEMIDE 20 MG: 20 TABLET ORAL at 09:47

## 2020-06-12 RX ADMIN — CHLORHEXIDINE GLUCONATE 0.12% ORAL RINSE 15 ML: 1.2 LIQUID ORAL at 20:24

## 2020-06-12 RX ADMIN — Medication 13 ML: at 20:25

## 2020-06-12 RX ADMIN — TICAGRELOR 90 MG: 90 TABLET ORAL at 20:24

## 2020-06-12 RX ADMIN — ASPIRIN 81 MG CHEWABLE TABLET 81 MG: 81 TABLET CHEWABLE at 14:23

## 2020-06-12 RX ADMIN — CEPHALEXIN 500 MG: 250 POWDER, FOR SUSPENSION ORAL at 22:46

## 2020-06-12 RX ADMIN — LEVETIRACETAM 1000 MG: 100 SOLUTION ORAL at 20:25

## 2020-06-12 RX ADMIN — Medication 25 MG: at 20:24

## 2020-06-12 RX ADMIN — HEPARIN SODIUM 5000 UNITS: 5000 INJECTION, SOLUTION INTRAVENOUS; SUBCUTANEOUS at 16:18

## 2020-06-12 RX ADMIN — GUAIFENESIN 10 ML: 100 SOLUTION ORAL at 00:33

## 2020-06-12 RX ADMIN — MELATONIN TAB 3 MG 10 MG: 3 TAB at 22:43

## 2020-06-12 RX ADMIN — Medication 220 MG: at 10:50

## 2020-06-12 RX ADMIN — Medication 40 MG: at 09:45

## 2020-06-12 RX ADMIN — HEPARIN SODIUM 5000 UNITS: 5000 INJECTION, SOLUTION INTRAVENOUS; SUBCUTANEOUS at 00:32

## 2020-06-12 RX ADMIN — CEPHALEXIN 500 MG: 250 POWDER, FOR SUSPENSION ORAL at 04:41

## 2020-06-12 RX ADMIN — LEVETIRACETAM 1000 MG: 100 SOLUTION ORAL at 09:45

## 2020-06-12 RX ADMIN — LISINOPRIL 5 MG: 5 TABLET ORAL at 09:52

## 2020-06-12 RX ADMIN — ATORVASTATIN CALCIUM 10 MG: 10 TABLET, FILM COATED ORAL at 20:24

## 2020-06-12 RX ADMIN — CEPHALEXIN 500 MG: 250 POWDER, FOR SUSPENSION ORAL at 10:51

## 2020-06-12 RX ADMIN — HEPARIN SODIUM 5000 UNITS: 5000 INJECTION, SOLUTION INTRAVENOUS; SUBCUTANEOUS at 09:49

## 2020-06-12 RX ADMIN — CEPHALEXIN 500 MG: 250 POWDER, FOR SUSPENSION ORAL at 16:18

## 2020-06-12 RX ADMIN — TICAGRELOR 90 MG: 90 TABLET ORAL at 09:53

## 2020-06-12 RX ADMIN — MICONAZOLE NITRATE: 20 POWDER TOPICAL at 09:30

## 2020-06-12 RX ADMIN — Medication 25 MG: at 09:53

## 2020-06-12 RX ADMIN — MULTIVITAMIN 15 ML: LIQUID ORAL at 09:52

## 2020-06-12 RX ADMIN — MICONAZOLE NITRATE: 20 POWDER TOPICAL at 20:27

## 2020-06-12 RX ADMIN — CHLORHEXIDINE GLUCONATE 0.12% ORAL RINSE 15 ML: 1.2 LIQUID ORAL at 09:45

## 2020-06-12 RX ADMIN — QUETIAPINE FUMARATE 50 MG: 50 TABLET ORAL at 22:44

## 2020-06-12 RX ADMIN — ONDANSETRON 4 MG: 2 INJECTION INTRAMUSCULAR; INTRAVENOUS at 14:23

## 2020-06-12 RX ADMIN — ASPIRIN 81 MG CHEWABLE TABLET 81 MG: 81 TABLET CHEWABLE at 09:52

## 2020-06-12 RX ADMIN — Medication 40 MG: at 20:33

## 2020-06-12 RX ADMIN — Medication 10 ML: at 20:35

## 2020-06-12 RX ADMIN — Medication 13 ML: at 09:47

## 2020-06-12 RX ADMIN — CHLORHEXIDINE GLUCONATE 0.12% ORAL RINSE 15 ML: 1.2 LIQUID ORAL at 14:24

## 2020-06-12 RX ADMIN — ONDANSETRON 4 MG: 2 INJECTION INTRAMUSCULAR; INTRAVENOUS at 22:03

## 2020-06-12 RX ADMIN — CHLORHEXIDINE GLUCONATE 0.12% ORAL RINSE 15 ML: 1.2 LIQUID ORAL at 16:36

## 2020-06-12 RX ADMIN — QUETIAPINE FUMARATE 25 MG: 25 TABLET ORAL at 09:52

## 2020-06-12 ASSESSMENT — ACTIVITIES OF DAILY LIVING (ADL)
ADLS_ACUITY_SCORE: 20
ADLS_ACUITY_SCORE: 19
ADLS_ACUITY_SCORE: 20

## 2020-06-12 NOTE — PROGRESS NOTES
Care Coordinator - Discharge Planning    Admission Date/Time:  5/17/2020  Attending MD:  Jessica, Cardiologist, *     Data  Date of initial CC assessment:  5/22/20  Chart reviewed, discussed with interdisciplinary team.   Patient was admitted for:   1. ST elevation MI (STEMI) (H)    2. Cardiac arrest (H)    3. Cardiogenic shock (H)    4. Posturing episode      Assessment   Per Cardiology team, patient will not be ready for discharge to [facility] today as he has been vomiting so will need w/u of that and monitoring. He may be ready over the weekend,    Coordination of Care and Referrals:   This RNCC spoke with [facility]anks liaison ([phone]). Informed him that patient is not ready today but may be over weekend. Golden reports that he has approval for admission but had no bed today. He reports that they may have a bed this weekend. He advised that weekend RNCC call him when patient is medically ready for transfer.     Plan  Anticipated Discharge Date: TBD-not medically ready today and no bed at [facility]  Anticipated Discharge Plan: Discharge to [facility]    Loyd Narayanan, LARRY Care Coordinator [phone]

## 2020-06-12 NOTE — PROGRESS NOTES
Cardiology Progress Note  Scot Bishop MRN: [id number]  Age: 39 year old, : 1980  Date: 2020            Assessment and Plan:     Scot Bishop is a 39 year old male who was admitted on 2020 for cardiac arrest. Pt reportedly had chest pain that started on 20. He was using Drano on his pipes at home and did not initially seek medical attention for CP and SOB since it said on the box that Drano can cause those symptoms. He took a shower and had syncopal episode after coming out of the shower. EMS was called; initial rhythm asystole. With chest compressions pt eventually had PEA and then eventually had VF in the field. After multiple cycles of epinephrine had ROSC. He was intubated in the field.   Pt was brought to [facility] ED where he regained a pulse and was brought to the Cath Lab for coronary angiogram. Initially, BP was 90s/60s as he was being transported from ED but he had recurrent cardiac arrest on arrival to Cath Lab. ECG showed ST elevation in aVR. He was promptly placed on VA ECMO. He had thrombotic occlusion of proximal LAD and underwent successful aspiration thrombectomy and PCI w/ NAZIA of proximal and mid LAD.     Interval events: had emesis yesterday evening, TF on hold overnight. Additional emesis this AM ~1 hour after meds given and unrelated to cough. Abd xray and ultrasound unremarkable. Physical exam unremarkable although pain assessment is limited by neuro status. Having loose stools which are not new. LFTs stable, lipase mildly elevated. PEG tube to drainage after emesis episode with additional ~250 mL of output.     Today's plan:   -lactate and repeat CBC pending   -add scheduled zofran (QTc 459 - repeat EKG tomorrow AM)   -clamp PEG tube and repeat ASA and ticagrelor   -cangrelor if further emesis   -restart trickle feeds tomorrow if no further emesis   -possible abd CT if ongoing issues  -defer transfer to [facility] until GI issues  resolved, possibly later this weekend     Neurology: Hypoxic brain injury   Initial CT head negative. Cooled to 34 degrees on arrival; rewarmed 5/19 AM. EEG 5/25 showed severe diffuse encephalopathy without seizures or epileptiform discharges.   Repeat CTH 5/25: multifocal acute/subacute cerebral infarcts   Brain MRI 5/26: diffuse acute/subacute infarcts in bilat cerebral hemispheres, corpus callosum, and periventricular white matter c/w evolving diffuse hypoxic ischemic brain injury   Episode of LUE posturing and rhythmic tremors overnight on 5/27. EEG restarted 5/28, now off.  CTH repeated 6/3 for lack of LLE movement - negative for new stroke or other acute changes.   -s/p trach placement 6/8  -seroquel 25 mg qAM and 50 mg qPM    -oxycodone d/c'd     -precedex if needed for agitation   -moving all extremities although nothing to command    Cardiovascular / Hemodynamics: Refractory VF arrest    S/p NAZIA to proximal-mid LAD  Sinus tachycardia   Peripheral VA ECMO inserted for cardiac arrest. LA 16 initially. Suspect ongoing tachycardia d/t evolving MI and post-arrest state. ECMO decannulation at bedside on 5/19; IABP discontinued 5/20. 23 second run on VT overnight on 5/27.   TTE 6/10/20: EF 36% w/ LAD territory akinesis, RV normal   EKG 6/8: sinus tachycardia, QTc 449.   -continue ASA 81mg daily and ticagrelor 90mg BID  -continue metoprolol tartrate 25 mg BID   -continue lisinopril 5 mg   -continue atorvastatin 10 mg   -continue lasix 20 mg daily    Pulmonary: Acute hypoxic respiratory failure  COVID negative  Admission CT chest showed bilateral consolidations c/w aspiration PNA. Had thick secretions that required bagging by RT on 5/20. Bronchoscopy 5/21. Increasing oxygen requirements and pt-vent dyssynchrony on 5/22. Pulmonary re-consulted, infectious work up, pt sedated. Significant pressure injury of tongue w/ splitting of tongue noted by [facility] nurse today. No active bleeding.    Vent settings this AM: PS 7/5  30%  ABG 5/31: 7.48/34/116/26   CXR 6/3: mild pulmonary edema    -s/p trach placement 6/8   -has been tolerating long periods of pressure support   -mucomyst and albuterol nebs PRN   -growing GNR in sputum; unasyn d/c'd, added vanc/zosyn 6/4 per ID  -vanc d/c'd 6/7 per ID  -discontinue zosyn per ID   -CXR as needed   -ABGs PRN   -ENT consult 5/27 for pressure injury of tongue; plan for follow up w/ them as outpatient for possible surgical repair of tongue    GI and Nutrition: Shock liver  GIB, resolved  Emesis    Three episodes of emesis since yesterday. Abd xray and ultrasound unremarkable. Loose stools which are not new.  -> 84, AST 52 -> 45. T bili 0.5. Lipase 407. PEG placement in IR 6/9.  -lactate and repeat CBC pending   -TF on hold; restart at [facility] if no further emesis      -ongoing loose stools  -C diff culture 6/1 negative    -GI Prophylaxis: Protonix BID for UGIB   Renal, Fluid and Electrolytes: Acute kidney injury, resolved    Cr improved to 0.84 this AM. Urine unmeasured d/t diaper. Na 140 today.   -continue FWF 30 mL q4hrs   -monitor I/O  -maintain K>3.8 and Mg>2    Infectious Disease: Aspiration pneumonia  Leukocytosis  WBC 14.4, tmax 99.4F. Grew GNR, acinetobacter (not baumannii) in sputum cx. Blood cx 6/1 grew GPCs, sputum cx 6/2 grew non-lactose fermenting GNR. ID consulted 6/3 for persistent fevers. Currently afebrile.   -vancomycin/zosyn x5 days (5/17-5/22) for asp PNA   -discontinued meropenem changed to unasyn 5/29  -unasyn discontinued and added vanc/zosyn 6/4  -discontinued vanc 6/7  -discontinue zosyn per ID   -added keflex for infection prophylaxis of ECMO decann site (d/t oozing)   -CT CAP negative for other source of infection   -scheduled acetaminophen changed to PRN   -C diff culture 6/1 negative      -monitor for signs of infection given lines and leukocytosis   Hematology and Oncology: Thrombocytosis, resolved    Plts 413 today. Receiving ASA/ticagrelor for NAZIA. Transfused 1  "unit PRBCs 5/21. Hgb 10.1 today. No signs of active bleeding.    -right inguinal hematoma on US at old ECMO cannulae insertion site; serosanguinous oozing from lateral aspect of site noted yesterday - keflex added   -peripheral smear showed normal platelet morphology   -daily CBC   -transfuse for Hgb<7   -DVT PPX: subcutaneous heparin    Endocrinology: No known medical history. BG elevated.  -not requiring insulin gtt  -HgbA1c 5.2   Lines:         R PICC line May 20, 2020 Restraint: not needed    Current lines are required for patient management       Family update by me today: Yes     Code Status: Full code     The pt was discussed and evaluated with Dr. Matamoros, attending physician, who agrees with the assessment and plan above.     Kala Chiang, DNP APRN CNP          Objective     BP 94/62   Pulse 118   Temp 98.9  F (37.2  C) (Axillary)   Resp 19   Ht 1.676 m (5' 6\")   Wt 85.4 kg (188 lb 4.4 oz)   SpO2 100%   BMI 30.39 kg/m    Temp:  [98.9  F (37.2  C)-99.6  F (37.6  C)] 98.9  F (37.2  C)  Heart Rate:  [] 111  Resp:  [12-24] 19  BP: ()/(45-78) 94/62  FiO2 (%):  [21 %-100 %] 35 %  SpO2:  [97 %-100 %] 100 %  Wt Readings from Last 2 Encounters:   06/11/20 85.4 kg (188 lb 4.4 oz)     I/O last 3 completed shifts:  In: 1795 [I.V.:115; NG/GT:480]  Out: 575 [Urine:475; Stool:100]      GENERAL APPEARANCE: NAD.  HEENT: No icterus, PERRL 3 mm, trach in place.  CARDIOVASCULAR: sinus tachycardia, normal S1 and S2, no S3 or S4 and no murmur, click or rub. Normal PMI. Pulses dopplerable.  RESP: Clear bilaterally. Trach dome.    GASTRO: Soft, bowel sounds hyperactive. PEG and rectal tube in place.   GENITOURINARY: Deferred.  EXTREMITIES: Warm, no edema.    NEURO: Pupils equal and reactive, 3 mm. Moving extremities and opening eyes although not following commands. Withdraws to pain.    INTEGUMENTARY: No rashes. Line sites CDI. Bilaterally groin sites CDI; right groin ecchymotic, incision intact with area " of firmness on medial aspect of incision, serosanguinous drainage from lateral aspect.  LINES/TUBES/DRAINS: PICC. Trach. PEG.           Data:     Vent Settings:  Resp: 19 SpO2: 100 % O2 Device: Trach dome Oxygen Delivery: Other (Comments)(30 LPM)    Arterial Blood Gas:   Recent Labs   Lab 20  022   PH 7.44   PCO2 35   PO2 154*   HCO3 24   O2PER 30.0       Vitals:    20 0400 20 0600 20 0400   Weight: 93.7 kg (206 lb 9.1 oz) 94.1 kg (207 lb 7.3 oz) 85.4 kg (188 lb 4.4 oz)   I/O last 3 completed shifts:  In: 1795 [I.V.:115; NG/GT:480]  Out: 575 [Urine:475; Stool:100]  Recent Labs   Lab 20  040    139   POTASSIUM 3.7 3.7   CHLORIDE 107 107   CO2 27 26   ANIONGAP 6 5   GLC 99 109*   BUN 25 24   CR 0.84 0.81   TEN 9.7 9.3     No components found for: URINE   Recent Labs   Lab 20  0720   AST 52*   *   BILITOTAL 0.5   ALBUMIN 2.9*   PROTTOTAL 7.6   ALKPHOS 148     Temp: 98.9  F (37.2  C) Temp src: AxillaryTemp  Min: 98.9  F (37.2  C)  Max: 99.6  F (37.6  C)   Recent Labs   Lab 06/12/20  0428 06/11/20  0402 06/10/20  0348 06/09/20  0410 20  0330   WBC 14.4* 13.4* 11.6* 10.8 9.7   HGB 10.1* 9.6* 8.5* 7.6* 7.9*   HCT 32.9* 30.8* 27.2* 26.4* 26.2*   MCV 96 96 95 103* 100   RDW 14.5 14.3 14.4 14.6 14.8    426 389 337 412     Recent Labs   Lab 20  0330   INR 1.10     Recent Labs   Lab 20  0402 06/10/20  0348 06/09/20  0720 20  0410   GLC 99 109* 96 127* Canceled, Test credited       All imaging personally reviewed:  Recent Results (from the past 24 hour(s))   Echocardiogram Complete    Narrative    [id number]  [id number]  [id number]  [id number]^[id number]           [facility],[place]  Echocardiography Laboratory  [address] [place] [id number]     Name: FAINA FORRESTER  MRN: [id number]  : 1980  Study Date: 2020 08:16 AM  Age: 39 yrs  Gender: Male  Patient Location:  [facility]  Reason For Study: Cardiac Arrest  Ordering Physician: YOSHI LANDRY  Performed By: Denisse Johnson RDCS     BSA: 2.1 m2  Height: 66 in  Weight: 231 lb  BP: 132/58 mmHg  _____________________________________________________________________________  __        Procedure  Complete Portable Echo Adult. Technically difficult study.Extremely poor  acoustic windows.  _____________________________________________________________________________  __        Interpretation Summary  VA ECMO at 3.7L/min. Technically difficult study. Extremely poor acoustic  windows.  Severely (EF <30%) reduced left ventricular function on extremely limited  views.  The right ventricle cannot be assessed.  No pericardial effusion is present.  Previous study not available for comparison.  _____________________________________________________________________________  __        Left Ventricle  Left ventricular wall thickness cannot evaluate. Left ventricular size is  normal. Severely (EF <30%) reduced left ventricular function is present. Left  ventricular diastolic function is not assessable. Severe diffuse hypokinesis  is present.     Right Ventricle  The right ventricle cannot be assessed. Right ventricular function cannot be  assessed due to poor image quality.     Mitral Valve  The mitral valve cannot be assessed.        Aortic Valve  The aortic valve opens with each cardiac cycle. On Doppler interrogation,  there is no significant stenosis or regurgitation.     Tricuspid Valve  The tricuspid valve cannot be assessed. Pulmonary artery systolic pressure  cannot be assessed.     Pulmonic Valve  The pulmonic valve cannot be assessed.     Vessels  The aorta root cannot be assessed. The thoracic aorta cannot be assessed.  Venous ECMO cannula is visualized traversing the IVC.     Pericardium  No pericardial effusion is present.     Compared to Previous Study  Previous study not available for comparison.      _____________________________________________________________________________  __                       Report approved by: Ashlee Izquierdo 05/18/2020 09:18 AM                 _____________________________________________________________________________  __                Medications     Current Facility-Administered Medications   Medication     0.9% sodium chloride BOLUS     0.9% sodium chloride BOLUS     acetaminophen (TYLENOL) tablet 650 mg     acetylcysteine (MUCOMYST) 10 % nebulizer solution 4 mL     artificial tears ophthalmic ointment     aspirin (ASA) chewable tablet 81 mg     atorvastatin (LIPITOR) tablet 10 mg     calcium chloride in  mL intermittent infusion 1 g     cephALEXin (KEFLEX) suspension 500 mg     chlorhexidine (PERIDEX) 0.12 % solution 15 mL     dextrose 10% infusion     glucose gel 15-30 g    Or     dextrose 50 % injection 25-50 mL    Or     glucagon injection 1 mg     furosemide (LASIX) tablet 20 mg     heparin ANTICOAGULANT injection 5,000 Units     heparin lock flush 10 UNIT/ML injection 5-10 mL     heparin lock flush 10 UNIT/ML injection 5-10 mL     HOLD MEDICATION (one time)     ipratropium - albuterol 0.5 mg/2.5 mg/3 mL (DUONEB) neb solution 3 mL     levalbuterol (XOPENEX) neb solution 0.63 mg     levETIRAcetam (KEPPRA) solution 1,000 mg     lidocaine (LMX4) cream     lidocaine (XYLOCAINE) 2 % external gel     lidocaine 1 % 0.1-1 mL     lisinopril (ZESTRIL) tablet 5 mg     magnesium sulfate 2 g in water intermittent infusion     magnesium sulfate 4 g in 100 mL sterile water (premade)     melatonin tablet 10 mg     metoprolol tartrate (LOPRESSOR) half-tab 25 mg     miconazole (MICATIN) 2 % powder     midazolam (VERSED) injection 1-2 mg     multivitamins w/minerals (CERTAVITE) liquid 15 mL     naloxone (NARCAN) injection 0.1-0.4 mg     ondansetron (ZOFRAN) injection 4 mg     oxyCODONE (ROXICODONE) tablet 5 mg     pantoprazole (PROTONIX) 2 mg/mL suspension 40 mg     potassium  chloride (KLOR-CON) Packet 20-40 mEq     potassium chloride 10 mEq in 100 mL intermittent infusion with 10 mg lidocaine     potassium chloride 10 mEq in 100 mL sterile water intermittent infusion (premix)     potassium chloride 20 mEq in 50 mL intermittent infusion     potassium chloride ER (KLOR-CON M) CR tablet 20-40 mEq     prochlorperazine (COMPAZINE) injection 5 mg     QUEtiapine (SEROquel) tablet 25 mg     QUEtiapine (SEROquel) tablet 50 mg     senna-docusate (SENOKOT-S/PERICOLACE) 8.6-50 MG per tablet 1 tablet     sodium chloride (NEBUSAL) 3 % neb solution 3 mL     sodium chloride (PF) 0.9% PF flush 10 mL     sodium chloride (PF) 0.9% PF flush 10-20 mL     sodium chloride (PF) 0.9% PF flush 3 mL     sodium chloride (PF) 0.9% PF flush 3 mL     sodium phosphate 10 mmol in D5W intermittent infusion     sodium phosphate 15 mmol in D5W intermittent infusion     sodium phosphate 20 mmol in D5W intermittent infusion     sodium phosphate 25 mmol in D5W intermittent infusion     ticagrelor (BRILINTA) tablet 90 mg     vitamin A-D & C drops (TRI-VI-SOL) drops 13 mL     zinc sulfate solution 220 mg

## 2020-06-12 NOTE — PLAN OF CARE
Major Shift Events:  Pt restless, not able to follow commands. Sinus tachycardia, -130s with cough. Emesis w/ cough x1, TF held.     Plan: Continue to monitor and follow plan of care. Pt to discharge to LTACH once bed available    For vital signs and complete assessments, please see documentation flowsheets.      Problem: Adult Inpatient Plan of Care  Goal: Optimal Comfort and Wellbeing  Outcome: No Change     Problem: Airway Clearance Ineffective  Goal: Effective Airway Clearance  Outcome: No Change    Problem: Confusion Acute  Goal: Optimal Cognitive Function  Outcome: No Change     Problem: Adult Inpatient Plan of Care  Goal: Readiness for Transition of Care  Outcome: Improving

## 2020-06-12 NOTE — PLAN OF CARE
Discharge Planner OT   Patient plan for discharge: Not stated  Current status: Pt with increased eye opening, however no visual tracking to voice or command following during session. Pt with mildly increased tone noted in RUE, able to achieve full range passively with increased time. Promoted functional ROM and joint integrity by providing PROM to BUE/BLE in all planes of motion. Max A x 2 for log roll L <> R, dependent for toileting, LB dressing, elizabeth cares.  Barriers to return to prior living situation: medical status, cognition, weakness  Recommendations for discharge: LTACH  Rationale for recommendations: Pt is currently below functional baseline for ADLs, transfers, and functional mobility, would benefit from continued therapy to address above deficits and maximize strength/independence in order to return to PLOF. Pt with minimal to no progress in therapy over the past few weeks, no improvements in cognition and no functional command following. Unable to progress in therapy due to severe cognitive deficits, plan to reduce frequency to 1x/week as needed.       Entered by: Melanie Silveira 06/12/2020 2:55 PM

## 2020-06-13 ENCOUNTER — APPOINTMENT (OUTPATIENT)
Dept: GENERAL RADIOLOGY | Facility: CLINIC | Age: 40
End: 2020-06-13
Attending: STUDENT IN AN ORGANIZED HEALTH CARE EDUCATION/TRAINING PROGRAM
Payer: MEDICAID

## 2020-06-13 LAB
ANION GAP SERPL CALCULATED.3IONS-SCNC: 6 MMOL/L (ref 3–14)
BUN SERPL-MCNC: 22 MG/DL (ref 7–30)
CALCIUM SERPL-MCNC: 9.9 MG/DL (ref 8.5–10.1)
CHLORIDE SERPL-SCNC: 106 MMOL/L (ref 94–109)
CO2 SERPL-SCNC: 28 MMOL/L (ref 20–32)
CREAT SERPL-MCNC: 0.88 MG/DL (ref 0.66–1.25)
ERYTHROCYTE [DISTWIDTH] IN BLOOD BY AUTOMATED COUNT: 14.1 % (ref 10–15)
GFR SERPL CREATININE-BSD FRML MDRD: >90 ML/MIN/{1.73_M2}
GLUCOSE BLDC GLUCOMTR-MCNC: 109 MG/DL (ref 70–99)
GLUCOSE SERPL-MCNC: 102 MG/DL (ref 70–99)
GRAM STN SPEC: ABNORMAL
HCT VFR BLD AUTO: 33.6 % (ref 40–53)
HGB BLD-MCNC: 10.4 G/DL (ref 13.3–17.7)
Lab: ABNORMAL
MCH RBC QN AUTO: 29.9 PG (ref 26.5–33)
MCHC RBC AUTO-ENTMCNC: 31 G/DL (ref 31.5–36.5)
MCV RBC AUTO: 97 FL (ref 78–100)
PLATELET # BLD AUTO: 371 10E9/L (ref 150–450)
POTASSIUM SERPL-SCNC: 3.6 MMOL/L (ref 3.4–5.3)
RBC # BLD AUTO: 3.48 10E12/L (ref 4.4–5.9)
SODIUM SERPL-SCNC: 140 MMOL/L (ref 133–144)
SPECIMEN SOURCE: ABNORMAL
WBC # BLD AUTO: 13.2 10E9/L (ref 4–11)

## 2020-06-13 PROCEDURE — 25000125 ZZHC RX 250: Performed by: NURSE PRACTITIONER

## 2020-06-13 PROCEDURE — 25000132 ZZH RX MED GY IP 250 OP 250 PS 637: Performed by: NURSE PRACTITIONER

## 2020-06-13 PROCEDURE — 25000128 H RX IP 250 OP 636: Performed by: STUDENT IN AN ORGANIZED HEALTH CARE EDUCATION/TRAINING PROGRAM

## 2020-06-13 PROCEDURE — 25000132 ZZH RX MED GY IP 250 OP 250 PS 637: Performed by: STUDENT IN AN ORGANIZED HEALTH CARE EDUCATION/TRAINING PROGRAM

## 2020-06-13 PROCEDURE — 87106 FUNGI IDENTIFICATION YEAST: CPT | Performed by: STUDENT IN AN ORGANIZED HEALTH CARE EDUCATION/TRAINING PROGRAM

## 2020-06-13 PROCEDURE — 80048 BASIC METABOLIC PNL TOTAL CA: CPT | Performed by: STUDENT IN AN ORGANIZED HEALTH CARE EDUCATION/TRAINING PROGRAM

## 2020-06-13 PROCEDURE — 25000132 ZZH RX MED GY IP 250 OP 250 PS 637: Performed by: INTERNAL MEDICINE

## 2020-06-13 PROCEDURE — 25000132 ZZH RX MED GY IP 250 OP 250 PS 637

## 2020-06-13 PROCEDURE — 87077 CULTURE AEROBIC IDENTIFY: CPT | Performed by: STUDENT IN AN ORGANIZED HEALTH CARE EDUCATION/TRAINING PROGRAM

## 2020-06-13 PROCEDURE — 93010 ELECTROCARDIOGRAM REPORT: CPT | Performed by: INTERNAL MEDICINE

## 2020-06-13 PROCEDURE — 87205 SMEAR GRAM STAIN: CPT | Performed by: STUDENT IN AN ORGANIZED HEALTH CARE EDUCATION/TRAINING PROGRAM

## 2020-06-13 PROCEDURE — 36415 COLL VENOUS BLD VENIPUNCTURE: CPT | Performed by: STUDENT IN AN ORGANIZED HEALTH CARE EDUCATION/TRAINING PROGRAM

## 2020-06-13 PROCEDURE — 40000275 ZZH STATISTIC RCP TIME EA 10 MIN

## 2020-06-13 PROCEDURE — 99233 SBSQ HOSP IP/OBS HIGH 50: CPT | Mod: GC | Performed by: INTERNAL MEDICINE

## 2020-06-13 PROCEDURE — 00000146 ZZHCL STATISTIC GLUCOSE BY METER IP

## 2020-06-13 PROCEDURE — 85027 COMPLETE CBC AUTOMATED: CPT | Performed by: STUDENT IN AN ORGANIZED HEALTH CARE EDUCATION/TRAINING PROGRAM

## 2020-06-13 PROCEDURE — 87040 BLOOD CULTURE FOR BACTERIA: CPT | Performed by: STUDENT IN AN ORGANIZED HEALTH CARE EDUCATION/TRAINING PROGRAM

## 2020-06-13 PROCEDURE — 20000004 ZZH R&B ICU UMMC

## 2020-06-13 PROCEDURE — 87070 CULTURE OTHR SPECIMN AEROBIC: CPT | Performed by: STUDENT IN AN ORGANIZED HEALTH CARE EDUCATION/TRAINING PROGRAM

## 2020-06-13 PROCEDURE — 87186 SC STD MICRODIL/AGAR DIL: CPT | Performed by: STUDENT IN AN ORGANIZED HEALTH CARE EDUCATION/TRAINING PROGRAM

## 2020-06-13 PROCEDURE — 25000128 H RX IP 250 OP 636: Performed by: NURSE PRACTITIONER

## 2020-06-13 PROCEDURE — 71045 X-RAY EXAM CHEST 1 VIEW: CPT

## 2020-06-13 RX ORDER — CEPHALEXIN 500 MG/1
500 CAPSULE ORAL EVERY 6 HOURS SCHEDULED
Status: DISCONTINUED | OUTPATIENT
Start: 2020-06-13 | End: 2020-06-14

## 2020-06-13 RX ADMIN — Medication 220 MG: at 10:49

## 2020-06-13 RX ADMIN — LEVETIRACETAM 1000 MG: 100 SOLUTION ORAL at 20:19

## 2020-06-13 RX ADMIN — Medication 13 ML: at 10:49

## 2020-06-13 RX ADMIN — MULTIVITAMIN 15 ML: LIQUID ORAL at 10:45

## 2020-06-13 RX ADMIN — HEPARIN SODIUM 5000 UNITS: 5000 INJECTION, SOLUTION INTRAVENOUS; SUBCUTANEOUS at 09:36

## 2020-06-13 RX ADMIN — TICAGRELOR 90 MG: 90 TABLET ORAL at 20:17

## 2020-06-13 RX ADMIN — MICONAZOLE NITRATE: 20 POWDER TOPICAL at 10:51

## 2020-06-13 RX ADMIN — CHLORHEXIDINE GLUCONATE 0.12% ORAL RINSE 15 ML: 1.2 LIQUID ORAL at 15:32

## 2020-06-13 RX ADMIN — MICONAZOLE NITRATE: 20 POWDER TOPICAL at 20:17

## 2020-06-13 RX ADMIN — Medication 40 MG: at 10:48

## 2020-06-13 RX ADMIN — CEPHALEXIN 500 MG: 250 POWDER, FOR SUSPENSION ORAL at 04:22

## 2020-06-13 RX ADMIN — Medication 13 ML: at 20:17

## 2020-06-13 RX ADMIN — Medication 25 MG: at 10:46

## 2020-06-13 RX ADMIN — CEPHALEXIN 500 MG: 500 CAPSULE ORAL at 15:52

## 2020-06-13 RX ADMIN — QUETIAPINE FUMARATE 50 MG: 50 TABLET ORAL at 21:46

## 2020-06-13 RX ADMIN — MELATONIN TAB 3 MG 10 MG: 3 TAB at 21:47

## 2020-06-13 RX ADMIN — TICAGRELOR 90 MG: 90 TABLET ORAL at 09:36

## 2020-06-13 RX ADMIN — Medication 40 MG: at 20:17

## 2020-06-13 RX ADMIN — HEPARIN SODIUM 5000 UNITS: 5000 INJECTION, SOLUTION INTRAVENOUS; SUBCUTANEOUS at 00:30

## 2020-06-13 RX ADMIN — HEPARIN SODIUM 5000 UNITS: 5000 INJECTION, SOLUTION INTRAVENOUS; SUBCUTANEOUS at 15:40

## 2020-06-13 RX ADMIN — LEVETIRACETAM 1000 MG: 100 SOLUTION ORAL at 10:48

## 2020-06-13 RX ADMIN — POTASSIUM CHLORIDE 20 MEQ: 29.8 INJECTION, SOLUTION INTRAVENOUS at 05:52

## 2020-06-13 RX ADMIN — Medication 10 ML: at 20:27

## 2020-06-13 RX ADMIN — ASPIRIN 81 MG CHEWABLE TABLET 81 MG: 81 TABLET CHEWABLE at 09:35

## 2020-06-13 RX ADMIN — ONDANSETRON 4 MG: 2 INJECTION INTRAMUSCULAR; INTRAVENOUS at 05:51

## 2020-06-13 RX ADMIN — CHLORHEXIDINE GLUCONATE 0.12% ORAL RINSE 15 ML: 1.2 LIQUID ORAL at 10:48

## 2020-06-13 RX ADMIN — CEPHALEXIN 500 MG: 500 CAPSULE ORAL at 21:47

## 2020-06-13 RX ADMIN — LISINOPRIL 5 MG: 5 TABLET ORAL at 10:46

## 2020-06-13 RX ADMIN — FUROSEMIDE 20 MG: 20 TABLET ORAL at 10:46

## 2020-06-13 RX ADMIN — CHLORHEXIDINE GLUCONATE 0.12% ORAL RINSE 15 ML: 1.2 LIQUID ORAL at 20:17

## 2020-06-13 RX ADMIN — ONDANSETRON 4 MG: 2 INJECTION INTRAMUSCULAR; INTRAVENOUS at 15:33

## 2020-06-13 RX ADMIN — CEPHALEXIN 500 MG: 250 POWDER, FOR SUSPENSION ORAL at 10:50

## 2020-06-13 RX ADMIN — ATORVASTATIN CALCIUM 10 MG: 10 TABLET, FILM COATED ORAL at 20:17

## 2020-06-13 RX ADMIN — Medication 25 MG: at 20:17

## 2020-06-13 RX ADMIN — ONDANSETRON 4 MG: 2 INJECTION INTRAMUSCULAR; INTRAVENOUS at 21:46

## 2020-06-13 RX ADMIN — QUETIAPINE FUMARATE 25 MG: 25 TABLET ORAL at 10:46

## 2020-06-13 ASSESSMENT — ACTIVITIES OF DAILY LIVING (ADL)
ADLS_ACUITY_SCORE: 19
ADLS_ACUITY_SCORE: 17
ADLS_ACUITY_SCORE: 19

## 2020-06-13 ASSESSMENT — MIFFLIN-ST. JEOR: SCORE: 1720.75

## 2020-06-13 NOTE — PROGRESS NOTES
Care Coordinator - Discharge Planning    Admission Date/Time:  5/17/2020  Attending MD:  Jessica, Cardiologist, *     Data  Chart reviewed, discussed with interdisciplinary team.   Patient was admitted for:   1. Coronary artery disease due to lipid rich plaque    2. ST elevation MI (STEMI) (H)    3. Cardiac arrest (H)    4. Cardiogenic shock (H)    5. Posturing episode         Assessment   Full assessment completed in previous note     Weekend RNCC requested to f/u on pt ability to discharge to [facility] over the weekend.   Noted per chart that pt has had difficulty tolerating enteral feeds.  Pt enteral feeds restarted this morning - trickle rate.    Spoke with Cards provider [facility] [id number] regarding pt status.   At this time team anticipates possible discharge on Sunday pending pt ability to tolerate enteral feeds.  Updated Denis Frnaks Liaison.        Coordination of Care and Referrals: Provided patient/family with options for LTACH.      Plan  Anticipated Discharge Date:  Possibly tomorrow 6/14  Anticipated Discharge Plan:  Denis LTCAMMY Adrian RN BSN, PHN, ACM-RN  RN Care Coordinator  Internal Medicine    6/13/2020 9:47 AM

## 2020-06-13 NOTE — PLAN OF CARE
ICU End of Shift Summary. See flowsheets for vital signs and detailed assessment.    Changes this shift: Bile emesis today with a strong cough and no sign of gastric contents in airway when suctioned. Tube feeds still held and ondansetron given. Abdominal US done.     Plan: Continue with current plan of care.

## 2020-06-13 NOTE — PLAN OF CARE
ICU End of Shift Summary. See flowsheets for vital signs and detailed assessment.    Changes this shift: Pt tmax 100.3. BC x2 sets and sputum sent for culture. Pt trach doming, continues on 30% fiO2. Strong cough. No emesis this shift; scheduled zofran given. TF remains off. Incontinent of stool x2, condom cath applied x2 but pt constantly moves LEs  and difficulty maintaining external catheter in place. Pt not following commands nor tracking.     Plan: Transfer to [facility] pending on pt's ability to tolerate TFs. Continue plan of care.

## 2020-06-13 NOTE — PROGRESS NOTES
Cardiology Progress Note  Scot Bishop MRN: [id number]  Age: 39 year old, : 1980  Date: 2020            Assessment and Plan:     Scot Bishop is a 39 year old male who was admitted on 2020 for cardiac arrest. Pt reportedly had chest pain that started on 20. He was using Drano on his pipes at home and did not initially seek medical attention for CP and SOB since it said on the box that Drano can cause those symptoms. He took a shower and had syncopal episode after coming out of the shower. EMS was called; initial rhythm asystole. With chest compressions pt eventually had PEA and then eventually had VF in the field. After multiple cycles of epinephrine had ROSC. He was intubated in the field.   Pt was brought to [facility] ED where he regained a pulse and was brought to the Cath Lab for coronary angiogram. Initially, BP was 90s/60s as he was being transported from ED but he had recurrent cardiac arrest on arrival to Cath Lab. ECG showed ST elevation in aVR. He was promptly placed on VA ECMO. He had thrombotic occlusion of proximal LAD and underwent successful aspiration thrombectomy and PCI w/ NAZIA of proximal and mid LAD.     Interval events:  - temp of 100.3 overnight, required panculture  - No emesis      Today's plan:   - Will start trickle feeds today  - If emesis, will consider cangrelor  - Dispo pending emesis and initiation of tube feeds    Neurology: Hypoxic brain injury   Initial CT head negative. Cooled to 34 degrees on arrival; rewarmed  AM. EEG  showed severe diffuse encephalopathy without seizures or epileptiform discharges.   Repeat CTH : multifocal acute/subacute cerebral infarcts   Brain MRI : diffuse acute/subacute infarcts in bilat cerebral hemispheres, corpus callosum, and periventricular white matter c/w evolving diffuse hypoxic ischemic brain injury   Episode of LUE posturing and rhythmic tremors overnight on . EEG restarted  5/28, now off.  CTH repeated 6/3 for lack of LLE movement - negative for new stroke or other acute changes.   -s/p trach placement 6/8  -seroquel 25 mg qAM and 50 mg qPM    -oxycodone d/c'd     -precedex if needed for agitation   -moving all extremities although nothing to command    Cardiovascular / Hemodynamics: Refractory VF arrest    S/p NAZIA to proximal-mid LAD  Sinus tachycardia   Peripheral VA ECMO inserted for cardiac arrest. LA 16 initially. Suspect ongoing tachycardia d/t evolving MI and post-arrest state. ECMO decannulation at bedside on 5/19; IABP discontinued 5/20. 23 second run on VT overnight on 5/27.   TTE 6/10/20: EF 36% w/ LAD territory akinesis, RV normal   EKG 6/8: sinus tachycardia, QTc 449.   -continue ASA 81mg daily and ticagrelor 90mg BID  -continue metoprolol tartrate 25 mg BID   -continue lisinopril 5 mg   -continue atorvastatin 10 mg   -continue lasix 20 mg daily    Pulmonary: Acute hypoxic respiratory failure  COVID negative  Admission CT chest showed bilateral consolidations c/w aspiration PNA. Had thick secretions that required bagging by RT on 5/20. Bronchoscopy 5/21. Increasing oxygen requirements and pt-vent dyssynchrony on 5/22. Pulmonary re-consulted, infectious work up, pt sedated. Significant pressure injury of tongue w/ splitting of tongue noted by [facility] nurse today. No active bleeding.    Vent settings this AM: PS 7/5 30%  ABG 5/31: 7.48/34/116/26   CXR 6/3: mild pulmonary edema    -s/p trach placement 6/8   -has been tolerating long periods of pressure support   -mucomyst and albuterol nebs PRN   -growing GNR in sputum; unasyn d/c'd, added vanc/zosyn 6/4 per ID  -vanc d/c'd 6/7 per ID  -discontinue zosyn per ID   -CXR as needed   -ABGs PRN   -ENT consult 5/27 for pressure injury of tongue; plan for follow up w/ them as outpatient for possible surgical repair of tongue    GI and Nutrition: Shock liver  GIB, resolved  Emesis    Three episodes of emesis since yesterday. Abd xray and  ultrasound unremarkable. Loose stools which are not new.  -> 84, AST 52 -> 45. T bili 0.5. Lipase 407. PEG placement in IR 6/9.  -lactate and repeat CBC pending   -TF on hold; restart at trickle if no further emesis      -ongoing loose stools  -C diff culture 6/1 negative    -GI Prophylaxis: Protonix BID for UGIB   Renal, Fluid and Electrolytes: Acute kidney injury, resolved    Cr improved to 0.84 this AM. Urine unmeasured d/t diaper. Na 140 today.   -continue FWF 30 mL q4hrs   -monitor I/O  -maintain K>3.8 and Mg>2    Infectious Disease: Aspiration pneumonia  Leukocytosis  WBC 14.4, tmax 99.4F. Grew GNR, acinetobacter (not baumannii) in sputum cx. Blood cx 6/1 grew GPCs, sputum cx 6/2 grew non-lactose fermenting GNR. ID consulted 6/3 for persistent fevers. Currently afebrile.   -vancomycin/zosyn x5 days (5/17-5/22) for asp PNA   -discontinued meropenem changed to unasyn 5/29  -unasyn discontinued and added vanc/zosyn 6/4  -discontinued vanc 6/7  -discontinue zosyn per ID   -added keflex for infection prophylaxis of ECMO decann site (d/t oozing)   -CT CAP negative for other source of infection   -scheduled acetaminophen changed to PRN   -C diff culture 6/1 negative      -monitor for signs of infection given lines and leukocytosis   Hematology and Oncology: Thrombocytosis, resolved    Plts 413 today. Receiving ASA/ticagrelor for NAZIA. Transfused 1 unit PRBCs 5/21. Hgb 10.1 today. No signs of active bleeding.    -right inguinal hematoma on US at old ECMO cannulae insertion site; serosanguinous oozing from lateral aspect of site noted yesterday - keflex added   -peripheral smear showed normal platelet morphology   -daily CBC   -transfuse for Hgb<7   -DVT PPX: subcutaneous heparin    Endocrinology: No known medical history. BG elevated.  -not requiring insulin gtt  -HgbA1c 5.2   Lines:         R PICC line May 20, 2020 Restraint: not needed    Current lines are required for patient management       Family update by  "me today: Yes     Code Status: Full code     The pt was discussed and evaluated with Dr. Alanis, attending physician, who agrees with the assessment and plan above.     Lainey Snow MD  Cardiology Fellow  PGY4            Objective     /79   Pulse 118   Temp 99.2  F (37.3  C) (Axillary)   Resp 17   Ht 1.676 m (5' 6\")   Wt 86.8 kg (191 lb 5.8 oz)   SpO2 100%   BMI 30.89 kg/m    Temp:  [97.4  F (36.3  C)-100.3  F (37.9  C)] 99.2  F (37.3  C)  Heart Rate:  [107-131] 112  Resp:  [11-85] 17  BP: ()/(44-93) 109/79  FiO2 (%):  [30 %] 30 %  SpO2:  [96 %-100 %] 100 %  Wt Readings from Last 2 Encounters:   06/13/20 86.8 kg (191 lb 5.8 oz)     I/O last 3 completed shifts:  In: 205 [I.V.:65; NG/GT:140]  Out: 830 [Urine:750; Emesis/NG output:80]      GENERAL APPEARANCE: NAD.  HEENT: No icterus, PERRL 3 mm, trach in place.  CARDIOVASCULAR: sinus tachycardia, normal S1 and S2, no S3 or S4 and no murmur, click or rub. Normal PMI. Pulses dopplerable.  RESP: Clear bilaterally. Trach dome.    GASTRO: Soft, bowel sounds hyperactive. PEG and rectal tube in place.   GENITOURINARY: Deferred.  EXTREMITIES: Warm, no edema.    NEURO: Pupils equal and reactive, 3 mm. Moving extremities and opening eyes although not following commands. Withdraws to pain.    INTEGUMENTARY: No rashes. Line sites CDI. Bilaterally groin sites CDI; right groin ecchymotic, incision intact with area of firmness on medial aspect of incision, serosanguinous drainage from lateral aspect.  LINES/TUBES/DRAINS: PICC. Trach. PEG.           Data:     Vent Settings:  Resp: 17 SpO2: 100 % O2 Device: Trach dome Oxygen Delivery: Other (Comments)(35)    Arterial Blood Gas:   No lab results found in last 7 days.    Vitals:    06/09/20 0600 06/11/20 0400 06/13/20 0000   Weight: 94.1 kg (207 lb 7.3 oz) 85.4 kg (188 lb 4.4 oz) 86.8 kg (191 lb 5.8 oz)   I/O last 3 completed shifts:  In: 205 [I.V.:65; NG/GT:140]  Out: 830 [Urine:750; Emesis/NG " output:80]  Recent Labs   Lab 20  0412 20  0428    140   POTASSIUM 3.6 3.7   CHLORIDE 106 107   CO2 28 27   ANIONGAP 6 6   * 99   BUN 22 25   CR 0.88 0.84   TEN 9.9 9.7     No components found for: URINE   Recent Labs   Lab 20  0428 20  0720   AST 45 52*   ALT 84* 112*   BILITOTAL 0.5 0.5   ALBUMIN 3.0* 2.9*   PROTTOTAL 8.4 7.6   ALKPHOS 144 148     Temp: 99.2  F (37.3  C) Temp src: AxillaryTemp  Min: 97.4  F (36.3  C)  Max: 100.3  F (37.9  C)   Recent Labs   Lab 20  0412 20  1437 20  0428 20  0402 06/10/20  0348   WBC 13.2* 15.2* 14.4* 13.4* 11.6*   HGB 10.4* 10.1* 10.1* 9.6* 8.5*   HCT 33.6* 33.1* 32.9* 30.8* 27.2*   MCV 97 97 96 96 95   RDW 14.1 14.4 14.5 14.3 14.4    406 413 426 389     Recent Labs   Lab 20  0330   INR 1.10     Recent Labs   Lab 20  0412 20  0428 20  0402 06/10/20  0348 20  0720   * 99 109* 96 127*       All imaging personally reviewed:  Recent Results (from the past 24 hour(s))   Echocardiogram Complete    Narrative    [id number]  [id number]  [id number]  [id number]^GRIS^YOSHI           [facility],[place]  Echocardiography Laboratory  [address] [place] [id number]     Name: FAINA FORRESTER  MRN: [id number]  : 1980  Study Date: 2020 08:16 AM  Age: 39 yrs  Gender: Male  Patient [facility]: [facility]  Reason For Study: Cardiac Arrest  Ordering Physician: YOSHI LANDRY  Performed By: Denisse Johnson RDCS     BSA: 2.1 m2  Height: 66 in  Weight: 231 lb  BP: 132/58 mmHg  _____________________________________________________________________________  __        Procedure  Complete Portable Echo Adult. Technically difficult study.Extremely poor  acoustic windows.  _____________________________________________________________________________  __        Interpretation Summary  VA ECMO at 3.7L/min. Technically difficult study. Extremely poor acoustic  windows.  Severely (EF  <30%) reduced left ventricular function on extremely limited  views.  The right ventricle cannot be assessed.  No pericardial effusion is present.  Previous study not available for comparison.  _____________________________________________________________________________  __        Left Ventricle  Left ventricular wall thickness cannot evaluate. Left ventricular size is  normal. Severely (EF <30%) reduced left ventricular function is present. Left  ventricular diastolic function is not assessable. Severe diffuse hypokinesis  is present.     Right Ventricle  The right ventricle cannot be assessed. Right ventricular function cannot be  assessed due to poor image quality.     Mitral Valve  The mitral valve cannot be assessed.        Aortic Valve  The aortic valve opens with each cardiac cycle. On Doppler interrogation,  there is no significant stenosis or regurgitation.     Tricuspid Valve  The tricuspid valve cannot be assessed. Pulmonary artery systolic pressure  cannot be assessed.     Pulmonic Valve  The pulmonic valve cannot be assessed.     Vessels  The aorta root cannot be assessed. The thoracic aorta cannot be assessed.  Venous ECMO cannula is visualized traversing the IVC.     Pericardium  No pericardial effusion is present.     Compared to Previous Study  Previous study not available for comparison.     _____________________________________________________________________________  __                       Report approved by: Ashlee Izquierdo 05/18/2020 09:18 AM                 _____________________________________________________________________________  __                Medications     Current Facility-Administered Medications   Medication     0.9% sodium chloride BOLUS     0.9% sodium chloride BOLUS     acetaminophen (TYLENOL) tablet 650 mg     acetylcysteine (MUCOMYST) 10 % nebulizer solution 4 mL     artificial tears ophthalmic ointment     aspirin (ASA) chewable tablet 81 mg     atorvastatin  (LIPITOR) tablet 10 mg     calcium chloride in  mL intermittent infusion 1 g     cephALEXin (KEFLEX) suspension 500 mg     chlorhexidine (PERIDEX) 0.12 % solution 15 mL     dextrose 10% infusion     glucose gel 15-30 g    Or     dextrose 50 % injection 25-50 mL    Or     glucagon injection 1 mg     furosemide (LASIX) tablet 20 mg     heparin ANTICOAGULANT injection 5,000 Units     heparin lock flush 10 UNIT/ML injection 5-10 mL     heparin lock flush 10 UNIT/ML injection 5-10 mL     HOLD MEDICATION (one time)     ipratropium - albuterol 0.5 mg/2.5 mg/3 mL (DUONEB) neb solution 3 mL     levalbuterol (XOPENEX) neb solution 0.63 mg     levETIRAcetam (KEPPRA) solution 1,000 mg     lidocaine (LMX4) cream     lidocaine (XYLOCAINE) 2 % external gel     lidocaine 1 % 0.1-1 mL     lisinopril (ZESTRIL) tablet 5 mg     magnesium sulfate 2 g in water intermittent infusion     magnesium sulfate 4 g in 100 mL sterile water (premade)     melatonin tablet 10 mg     metoprolol tartrate (LOPRESSOR) half-tab 25 mg     miconazole (MICATIN) 2 % powder     midazolam (VERSED) injection 1-2 mg     multivitamins w/minerals (CERTAVITE) liquid 15 mL     naloxone (NARCAN) injection 0.1-0.4 mg     ondansetron (ZOFRAN) injection 4 mg     pantoprazole (PROTONIX) 2 mg/mL suspension 40 mg     potassium chloride (KLOR-CON) Packet 20-40 mEq     potassium chloride 10 mEq in 100 mL intermittent infusion with 10 mg lidocaine     potassium chloride 10 mEq in 100 mL sterile water intermittent infusion (premix)     potassium chloride 20 mEq in 50 mL intermittent infusion     potassium chloride ER (KLOR-CON M) CR tablet 20-40 mEq     prochlorperazine (COMPAZINE) injection 5 mg     QUEtiapine (SEROquel) tablet 25 mg     QUEtiapine (SEROquel) tablet 50 mg     senna-docusate (SENOKOT-S/PERICOLACE) 8.6-50 MG per tablet 1 tablet     sodium chloride (NEBUSAL) 3 % neb solution 3 mL     sodium chloride (PF) 0.9% PF flush 10 mL     sodium chloride (PF) 0.9% PF  flush 10-20 mL     sodium chloride (PF) 0.9% PF flush 3 mL     sodium chloride (PF) 0.9% PF flush 3 mL     sodium phosphate 10 mmol in D5W intermittent infusion     sodium phosphate 15 mmol in D5W intermittent infusion     sodium phosphate 20 mmol in D5W intermittent infusion     sodium phosphate 25 mmol in D5W intermittent infusion     ticagrelor (BRILINTA) tablet 90 mg     vitamin A-D & C drops (TRI-VI-SOL) drops 13 mL     zinc sulfate solution 220 mg                  I have seen and examined the patient with the [facility] team. I agree with the assessment and plan of the note above.I have reviewed pertinent labs.     Jefferson Alanis MD  Interventional Cardiology  Pager: [phone]

## 2020-06-14 ENCOUNTER — APPOINTMENT (OUTPATIENT)
Dept: GENERAL RADIOLOGY | Facility: CLINIC | Age: 40
End: 2020-06-14
Attending: STUDENT IN AN ORGANIZED HEALTH CARE EDUCATION/TRAINING PROGRAM
Payer: MEDICAID

## 2020-06-14 LAB
ALBUMIN UR-MCNC: NEGATIVE MG/DL
ANION GAP SERPL CALCULATED.3IONS-SCNC: 7 MMOL/L (ref 3–14)
APPEARANCE UR: ABNORMAL
BACTERIA #/AREA URNS HPF: ABNORMAL /HPF
BILIRUB UR QL STRIP: NEGATIVE
BUN SERPL-MCNC: 27 MG/DL (ref 7–30)
C DIFF TOX B STL QL: NEGATIVE
CALCIUM SERPL-MCNC: 10.1 MG/DL (ref 8.5–10.1)
CHLORIDE SERPL-SCNC: 105 MMOL/L (ref 94–109)
CO2 SERPL-SCNC: 29 MMOL/L (ref 20–32)
COLOR UR AUTO: YELLOW
CREAT SERPL-MCNC: 1.04 MG/DL (ref 0.66–1.25)
ERYTHROCYTE [DISTWIDTH] IN BLOOD BY AUTOMATED COUNT: 13.8 % (ref 10–15)
GFR SERPL CREATININE-BSD FRML MDRD: 89 ML/MIN/{1.73_M2}
GLUCOSE BLDC GLUCOMTR-MCNC: 130 MG/DL (ref 70–99)
GLUCOSE SERPL-MCNC: 122 MG/DL (ref 70–99)
GLUCOSE UR STRIP-MCNC: NEGATIVE MG/DL
GRAM STN SPEC: NORMAL
GRAM STN SPEC: NORMAL
HCT VFR BLD AUTO: 34 % (ref 40–53)
HGB BLD-MCNC: 10.4 G/DL (ref 13.3–17.7)
HGB UR QL STRIP: NEGATIVE
HYALINE CASTS #/AREA URNS LPF: 8 /LPF (ref 0–2)
KETONES UR STRIP-MCNC: NEGATIVE MG/DL
LEUKOCYTE ESTERASE UR QL STRIP: NEGATIVE
Lab: NORMAL
MCH RBC QN AUTO: 29.1 PG (ref 26.5–33)
MCHC RBC AUTO-ENTMCNC: 30.6 G/DL (ref 31.5–36.5)
MCV RBC AUTO: 95 FL (ref 78–100)
NITRATE UR QL: NEGATIVE
PH UR STRIP: 5 PH (ref 5–7)
PLATELET # BLD AUTO: 396 10E9/L (ref 150–450)
POTASSIUM SERPL-SCNC: 3.7 MMOL/L (ref 3.4–5.3)
RBC # BLD AUTO: 3.57 10E12/L (ref 4.4–5.9)
RBC #/AREA URNS AUTO: 1 /HPF (ref 0–2)
SODIUM SERPL-SCNC: 141 MMOL/L (ref 133–144)
SOURCE: ABNORMAL
SP GR UR STRIP: 1.01 (ref 1–1.03)
SPECIMEN SOURCE: NORMAL
SPECIMEN SOURCE: NORMAL
SPERM #/AREA URNS HPF: PRESENT /HPF
SQUAMOUS #/AREA URNS AUTO: <1 /HPF (ref 0–1)
URATE CRY #/AREA URNS HPF: ABNORMAL /HPF
UROBILINOGEN UR STRIP-MCNC: NORMAL MG/DL (ref 0–2)
WBC # BLD AUTO: 15.3 10E9/L (ref 4–11)
WBC #/AREA URNS AUTO: <1 /HPF (ref 0–5)

## 2020-06-14 PROCEDURE — 40000275 ZZH STATISTIC RCP TIME EA 10 MIN

## 2020-06-14 PROCEDURE — 80048 BASIC METABOLIC PNL TOTAL CA: CPT | Performed by: STUDENT IN AN ORGANIZED HEALTH CARE EDUCATION/TRAINING PROGRAM

## 2020-06-14 PROCEDURE — 87070 CULTURE OTHR SPECIMN AEROBIC: CPT | Performed by: STUDENT IN AN ORGANIZED HEALTH CARE EDUCATION/TRAINING PROGRAM

## 2020-06-14 PROCEDURE — 25000128 H RX IP 250 OP 636: Performed by: NURSE PRACTITIONER

## 2020-06-14 PROCEDURE — 25000132 ZZH RX MED GY IP 250 OP 250 PS 637

## 2020-06-14 PROCEDURE — 87040 BLOOD CULTURE FOR BACTERIA: CPT | Performed by: STUDENT IN AN ORGANIZED HEALTH CARE EDUCATION/TRAINING PROGRAM

## 2020-06-14 PROCEDURE — 71045 X-RAY EXAM CHEST 1 VIEW: CPT

## 2020-06-14 PROCEDURE — 25000128 H RX IP 250 OP 636: Performed by: STUDENT IN AN ORGANIZED HEALTH CARE EDUCATION/TRAINING PROGRAM

## 2020-06-14 PROCEDURE — 00000146 ZZHCL STATISTIC GLUCOSE BY METER IP

## 2020-06-14 PROCEDURE — 25000132 ZZH RX MED GY IP 250 OP 250 PS 637: Performed by: STUDENT IN AN ORGANIZED HEALTH CARE EDUCATION/TRAINING PROGRAM

## 2020-06-14 PROCEDURE — 25800030 ZZH RX IP 258 OP 636: Performed by: STUDENT IN AN ORGANIZED HEALTH CARE EDUCATION/TRAINING PROGRAM

## 2020-06-14 PROCEDURE — 87077 CULTURE AEROBIC IDENTIFY: CPT | Performed by: STUDENT IN AN ORGANIZED HEALTH CARE EDUCATION/TRAINING PROGRAM

## 2020-06-14 PROCEDURE — 40000047 ZZH STATISTIC CTO2 CONT OXYGEN TECH TIME EA 90 MIN

## 2020-06-14 PROCEDURE — 93005 ELECTROCARDIOGRAM TRACING: CPT

## 2020-06-14 PROCEDURE — 25000132 ZZH RX MED GY IP 250 OP 250 PS 637: Performed by: INTERNAL MEDICINE

## 2020-06-14 PROCEDURE — 36415 COLL VENOUS BLD VENIPUNCTURE: CPT | Performed by: STUDENT IN AN ORGANIZED HEALTH CARE EDUCATION/TRAINING PROGRAM

## 2020-06-14 PROCEDURE — 93010 ELECTROCARDIOGRAM REPORT: CPT | Performed by: INTERNAL MEDICINE

## 2020-06-14 PROCEDURE — 25000132 ZZH RX MED GY IP 250 OP 250 PS 637: Performed by: NURSE PRACTITIONER

## 2020-06-14 PROCEDURE — 87493 C DIFF AMPLIFIED PROBE: CPT | Performed by: STUDENT IN AN ORGANIZED HEALTH CARE EDUCATION/TRAINING PROGRAM

## 2020-06-14 PROCEDURE — 20000004 ZZH R&B ICU UMMC

## 2020-06-14 PROCEDURE — 99233 SBSQ HOSP IP/OBS HIGH 50: CPT | Mod: GC | Performed by: INTERNAL MEDICINE

## 2020-06-14 PROCEDURE — 85027 COMPLETE CBC AUTOMATED: CPT | Performed by: STUDENT IN AN ORGANIZED HEALTH CARE EDUCATION/TRAINING PROGRAM

## 2020-06-14 PROCEDURE — 81001 URINALYSIS AUTO W/SCOPE: CPT | Performed by: STUDENT IN AN ORGANIZED HEALTH CARE EDUCATION/TRAINING PROGRAM

## 2020-06-14 PROCEDURE — G0463 HOSPITAL OUTPT CLINIC VISIT: HCPCS

## 2020-06-14 PROCEDURE — 25000125 ZZHC RX 250: Performed by: NURSE PRACTITIONER

## 2020-06-14 PROCEDURE — 87205 SMEAR GRAM STAIN: CPT | Performed by: STUDENT IN AN ORGANIZED HEALTH CARE EDUCATION/TRAINING PROGRAM

## 2020-06-14 RX ORDER — SIMETHICONE 40MG/0.6ML
40 SUSPENSION, DROPS(FINAL DOSAGE FORM)(ML) ORAL EVERY 6 HOURS PRN
Status: DISCONTINUED | OUTPATIENT
Start: 2020-06-14 | End: 2020-06-25 | Stop reason: HOSPADM

## 2020-06-14 RX ORDER — PIPERACILLIN SODIUM, TAZOBACTAM SODIUM 4; .5 G/20ML; G/20ML
4.5 INJECTION, POWDER, LYOPHILIZED, FOR SOLUTION INTRAVENOUS EVERY 6 HOURS
Status: COMPLETED | OUTPATIENT
Start: 2020-06-14 | End: 2020-06-20

## 2020-06-14 RX ORDER — OXYCODONE HCL 5 MG/5 ML
5 SOLUTION, ORAL ORAL ONCE
Status: COMPLETED | OUTPATIENT
Start: 2020-06-14 | End: 2020-06-14

## 2020-06-14 RX ADMIN — PIPERACILLIN AND TAZOBACTAM 4.5 G: 4; .5 INJECTION, POWDER, FOR SOLUTION INTRAVENOUS at 12:04

## 2020-06-14 RX ADMIN — HEPARIN SODIUM 5000 UNITS: 5000 INJECTION, SOLUTION INTRAVENOUS; SUBCUTANEOUS at 00:17

## 2020-06-14 RX ADMIN — TICAGRELOR 90 MG: 90 TABLET ORAL at 19:46

## 2020-06-14 RX ADMIN — CHLORHEXIDINE GLUCONATE 0.12% ORAL RINSE 15 ML: 1.2 LIQUID ORAL at 15:44

## 2020-06-14 RX ADMIN — MICONAZOLE NITRATE: 20 POWDER TOPICAL at 19:46

## 2020-06-14 RX ADMIN — LEVETIRACETAM 1000 MG: 100 SOLUTION ORAL at 09:14

## 2020-06-14 RX ADMIN — MULTIVITAMIN 15 ML: LIQUID ORAL at 09:15

## 2020-06-14 RX ADMIN — MELATONIN TAB 3 MG 10 MG: 3 TAB at 22:06

## 2020-06-14 RX ADMIN — CHLORHEXIDINE GLUCONATE 0.12% ORAL RINSE 15 ML: 1.2 LIQUID ORAL at 19:45

## 2020-06-14 RX ADMIN — Medication 40 MG: at 09:15

## 2020-06-14 RX ADMIN — Medication 13 ML: at 09:15

## 2020-06-14 RX ADMIN — QUETIAPINE FUMARATE 25 MG: 25 TABLET ORAL at 09:15

## 2020-06-14 RX ADMIN — ASPIRIN 81 MG CHEWABLE TABLET 81 MG: 81 TABLET CHEWABLE at 09:15

## 2020-06-14 RX ADMIN — ONDANSETRON 4 MG: 2 INJECTION INTRAMUSCULAR; INTRAVENOUS at 05:46

## 2020-06-14 RX ADMIN — ACETAMINOPHEN 650 MG: 325 TABLET, FILM COATED ORAL at 19:45

## 2020-06-14 RX ADMIN — SODIUM CHLORIDE 500 ML: 9 INJECTION, SOLUTION INTRAVENOUS at 07:01

## 2020-06-14 RX ADMIN — SIMETHICONE 40 MG: 20 EMULSION ORAL at 17:51

## 2020-06-14 RX ADMIN — Medication 13 ML: at 19:49

## 2020-06-14 RX ADMIN — CHLORHEXIDINE GLUCONATE 0.12% ORAL RINSE 15 ML: 1.2 LIQUID ORAL at 11:29

## 2020-06-14 RX ADMIN — ATORVASTATIN CALCIUM 10 MG: 10 TABLET, FILM COATED ORAL at 19:46

## 2020-06-14 RX ADMIN — SODIUM CHLORIDE, POTASSIUM CHLORIDE, SODIUM LACTATE AND CALCIUM CHLORIDE 250 ML: 600; 310; 30; 20 INJECTION, SOLUTION INTRAVENOUS at 04:13

## 2020-06-14 RX ADMIN — ONDANSETRON 4 MG: 2 INJECTION INTRAMUSCULAR; INTRAVENOUS at 22:06

## 2020-06-14 RX ADMIN — ONDANSETRON 4 MG: 2 INJECTION INTRAMUSCULAR; INTRAVENOUS at 14:30

## 2020-06-14 RX ADMIN — OXYCODONE HYDROCHLORIDE 5 MG: 5 SOLUTION ORAL at 11:29

## 2020-06-14 RX ADMIN — SIMETHICONE 40 MG: 20 EMULSION ORAL at 11:29

## 2020-06-14 RX ADMIN — LISINOPRIL 5 MG: 5 TABLET ORAL at 09:15

## 2020-06-14 RX ADMIN — CEPHALEXIN 500 MG: 500 CAPSULE ORAL at 04:18

## 2020-06-14 RX ADMIN — HEPARIN SODIUM 5000 UNITS: 5000 INJECTION, SOLUTION INTRAVENOUS; SUBCUTANEOUS at 09:15

## 2020-06-14 RX ADMIN — Medication 25 MG: at 09:15

## 2020-06-14 RX ADMIN — TICAGRELOR 90 MG: 90 TABLET ORAL at 09:15

## 2020-06-14 RX ADMIN — LEVETIRACETAM 1000 MG: 100 SOLUTION ORAL at 19:49

## 2020-06-14 RX ADMIN — QUETIAPINE FUMARATE 50 MG: 50 TABLET ORAL at 22:07

## 2020-06-14 RX ADMIN — FUROSEMIDE 20 MG: 20 TABLET ORAL at 09:15

## 2020-06-14 RX ADMIN — Medication 220 MG: at 09:14

## 2020-06-14 RX ADMIN — CHLORHEXIDINE GLUCONATE 0.12% ORAL RINSE 15 ML: 1.2 LIQUID ORAL at 09:15

## 2020-06-14 RX ADMIN — HEPARIN SODIUM 5000 UNITS: 5000 INJECTION, SOLUTION INTRAVENOUS; SUBCUTANEOUS at 15:48

## 2020-06-14 RX ADMIN — MICONAZOLE NITRATE: 20 POWDER TOPICAL at 09:34

## 2020-06-14 RX ADMIN — Medication 25 MG: at 19:46

## 2020-06-14 RX ADMIN — Medication 40 MG: at 19:49

## 2020-06-14 RX ADMIN — PIPERACILLIN AND TAZOBACTAM 4.5 G: 4; .5 INJECTION, POWDER, FOR SOLUTION INTRAVENOUS at 18:07

## 2020-06-14 ASSESSMENT — ACTIVITIES OF DAILY LIVING (ADL)
ADLS_ACUITY_SCORE: 17

## 2020-06-14 ASSESSMENT — MIFFLIN-ST. JEOR: SCORE: 1707.75

## 2020-06-14 NOTE — PROGRESS NOTES
Cardiology Progress Note  Scot Bishop MRN: [id number]  Age: 39 year old, : 1980            Assessment and Plan:     Scot Bishop is a 39 year old male who was admitted on 2020 for cardiac arrest. Pt reportedly had chest pain that started on 20. He was using Drano on his pipes at home and did not initially seek medical attention for CP and SOB since it said on the box that Drano can cause those symptoms. He took a shower and had syncopal episode after coming out of the shower. EMS was called; initial rhythm asystole. With chest compressions pt eventually had PEA and then eventually had VF in the field. After multiple cycles of epinephrine had ROSC. He was intubated in the field.   Pt was brought to [facility] ED where he regained a pulse and was brought to the Cath Lab for coronary angiogram. Initially, BP was 90s/60s as he was being transported from ED but he had recurrent cardiac arrest on arrival to Cath Lab. ECG showed ST elevation in aVR. He was promptly placed on VA ECMO. He had thrombotic occlusion of proximal LAD and underwent successful aspiration thrombectomy and PCI w/ NAZIA of proximal and mid LAD.     Interval events:  - No emesis overnight   - Continues to have multiple episodes of loose stools   - Low grade fever this AM    Today's plan:   - If emesis, will consider cangrelor  - Advance tube feeds to goal   - Check C.diff given leukocytosis, persistent loose stools   - 500cc IVF bolus   - Repeat infectious work-up: UA, blood culture, endotracheal culture, CXR   - Defer transfer to [facility]     Neurology: Hypoxic brain injury   Initial CT head negative. Cooled to 34 degrees on arrival; rewarmed  AM. EEG  showed severe diffuse encephalopathy without seizures or epileptiform discharges.   Repeat CTH : multifocal acute/subacute cerebral infarcts   Brain MRI : diffuse acute/subacute infarcts in bilat cerebral hemispheres, corpus callosum, and  periventricular white matter c/w evolving diffuse hypoxic ischemic brain injury   Episode of LUE posturing and rhythmic tremors overnight on 5/27. EEG restarted 5/28, now off.  CTH repeated 6/3 for lack of LLE movement - negative for new stroke or other acute changes.   -s/p trach placement 6/8  -seroquel 25 mg qAM and 50 mg qPM    -oxycodone d/c'd     -precedex if needed for agitation   -moving all extremities although nothing to command    Cardiovascular / Hemodynamics: Refractory VF arrest    S/p NAZIA to proximal-mid LAD  Sinus tachycardia   Peripheral VA ECMO inserted for cardiac arrest. LA 16 initially. Suspect ongoing tachycardia d/t evolving MI and post-arrest state. ECMO decannulation at bedside on 5/19; IABP discontinued 5/20. 23 second run on VT overnight on 5/27.   TTE 6/10/20: EF 36% w/ LAD territory akinesis, RV normal   EKG 6/8: sinus tachycardia, QTc 449.   -continue ASA 81mg daily and ticagrelor 90mg BID  -continue metoprolol tartrate 25 mg BID   -continue lisinopril 5 mg   -continue atorvastatin 10 mg   -continue lasix 20 mg daily    Pulmonary: Acute hypoxic respiratory failure  COVID negative  Admission CT chest showed bilateral consolidations c/w aspiration PNA. Had thick secretions that required bagging by RT on 5/20. Bronchoscopy 5/21. Increasing oxygen requirements and pt-vent dyssynchrony on 5/22. Pulmonary re-consulted, infectious work up, pt sedated. Significant pressure injury of tongue w/ splitting of tongue noted by [facility] nurse today. No active bleeding.    Vent settings this AM: PS 7/5 30%  ABG 5/31: 7.48/34/116/26   CXR 6/3: mild pulmonary edema    -s/p trach placement 6/8   -has been tolerating long periods of pressure support   -mucomyst and albuterol nebs PRN   -growing GNR in sputum; unasyn d/c'd, added vanc/zosyn 6/4 per ID  -vanc d/c'd 6/7 per ID  -discontinue zosyn per ID   -CXR as needed   -ABGs PRN   -ENT consult 5/27 for pressure injury of tongue; plan for follow up w/ them as  outpatient for possible surgical repair of tongue    GI and Nutrition: Shock liver  GIB, resolved  Emesis    Three episodes of emesis since yesterday. Abd xray and ultrasound unremarkable. Loose stools which are not new.  -> 84, AST 52 -> 45. T bili 0.5. Lipase 407. PEG placement in IR 6/9.     -C diff culture 6/1 negative    -GI Prophylaxis: Protonix BID for UGIB  -Recheck C.diff given leukocytosis and persistent loose stools   -Advance tube feeds to goal if no emesis    Renal, Fluid and Electrolytes: Acute kidney injury, resolved    Cr improved 1.04 this AM. Urine unmeasured d/t diaper.  -continue FWF 30 mL q4hrs   -monitor I/O  -maintain K>3.8 and Mg>2    Infectious Disease: Aspiration pneumonia  Grew GNR, acinetobacter (not baumannii) in sputum cx. Blood cx 6/1 grew GPCs, sputum cx 6/2 grew non-lactose fermenting GNR. ID consulted 6/3 for persistent fevers. Currently afebrile.   -vancomycin/zosyn x5 days (5/17-5/22) for asp PNA   -discontinued meropenem changed to unasyn 5/29  -unasyn discontinued and added vanc/zosyn 6/4  -discontinued vanc 6/7  -discontinue zosyn per ID     Soft tissue infection   -added keflex for infection prophylaxis of ECMO decann site (d/t oozing)     Leukocytosis   -CT CAP negative for other source of infection   -scheduled acetaminophen changed to PRN   -C diff culture 6/1 negative      -Cultures NGTD  -Recheck cdiff, endotracheal culture, UA, CXR   -ID reconsulted.Will consider starting zosyn pending endotracheal cultures    Hematology and Oncology: Thrombocytosis, resolved    Plts 413 today. Receiving ASA/ticagrelor for NAZIA. Transfused 1 unit PRBCs 5/21. Hgb 10.1 today. No signs of active bleeding.    -right inguinal hematoma on US at old ECMO cannulae insertion site; serosanguinous oozing from lateral aspect of site noted yesterday - keflex added   -peripheral smear showed normal platelet morphology   -daily CBC   -transfuse for Hgb<7   -DVT PPX: subcutaneous heparin   "  Endocrinology: No known medical history. BG elevated.  -not requiring insulin gtt  -HgbA1c 5.2   Lines:          Restraint: not needed    Current lines are required for patient management       Family update by me today: Yes     Code Status: Full code     The pt was discussed and evaluated with Dr. Alanis, attending physician, who agrees with the assessment and plan above.     Milagro Cevallos   PGY 4, Cardiology             Objective     BP 95/65   Pulse 118   Temp 99.4  F (37.4  C) (Axillary)   Resp 12   Ht 1.676 m (5' 6\")   Wt 85.5 kg (188 lb 7.9 oz)   SpO2 99%   BMI 30.42 kg/m    Temp:  [98.6  F (37  C)-99.4  F (37.4  C)] 99.4  F (37.4  C)  Heart Rate:  [112-141] 137  Resp:  [11-26] 12  BP: ()/() 95/65  FiO2 (%):  [30 %] 30 %  SpO2:  [80 %-100 %] 99 %  Wt Readings from Last 2 Encounters:   06/14/20 85.5 kg (188 lb 7.9 oz)     I/O last 3 completed shifts:  In: 800 [NG/GT:350; IV Piggyback:250]  Out: 1310 [Urine:1250; Emesis/NG output:60]      GENERAL APPEARANCE: NAD.  HEENT: No icterus, PERRL 3 mm, trach in place.  CARDIOVASCULAR: sinus tachycardia, normal S1 and S2, no S3 or S4 and no murmur, click or rub. Normal PMI. Pulses dopplerable.  RESP: Clear bilaterally. Trach dome.    GASTRO: Soft, bowel sounds hyperactive. PEG and rectal tube in place.   GENITOURINARY: Deferred.  EXTREMITIES: Warm, no edema.    NEURO: Pupils equal and reactive, 3 mm. Moving extremities and opening eyes although not following commands. Withdraws to pain.    INTEGUMENTARY: No rashes. Line sites CDI. Bilaterally groin sites CDI; right groin ecchymotic, incision intact with area of firmness on medial aspect of incision, serosanguinous drainage from lateral aspect.  LINES/TUBES/DRAINS: Trach. PEG.           Data:     Vent Settings:  Resp: 12 SpO2: 99 % O2 Device: Trach dome Oxygen Delivery: Other (Comments)(30 LPM)    Arterial Blood Gas:   No lab results found in last 7 days.    Vitals:    06/11/20 0400 06/13/20 " 0000 20 0200   Weight: 85.4 kg (188 lb 4.4 oz) 86.8 kg (191 lb 5.8 oz) 85.5 kg (188 lb 7.9 oz)   I/O last 3 completed shifts:  In: 800 [NG/GT:350; IV Piggyback:250]  Out: 1310 [Urine:1250; Emesis/NG output:60]  Recent Labs   Lab 20  0414 20  0412    140   POTASSIUM 3.7 3.6   CHLORIDE 105 106   CO2 29 28   ANIONGAP 7 6   * 102*   BUN 27 22   CR 1.04 0.88   TEN 10.1 9.9     No components found for: URINE   Recent Labs   Lab 20  0428 20  0720   AST 45 52*   ALT 84* 112*   BILITOTAL 0.5 0.5   ALBUMIN 3.0* 2.9*   PROTTOTAL 8.4 7.6   ALKPHOS 144 148     Temp: 99.4  F (37.4  C) Temp src: AxillaryTemp  Min: 98.6  F (37  C)  Max: 99.4  F (37.4  C)   Recent Labs   Lab 20  0414 20  0412 20  1437 20  0428 20  0402   WBC 15.3* 13.2* 15.2* 14.4* 13.4*   HGB 10.4* 10.4* 10.1* 10.1* 9.6*   HCT 34.0* 33.6* 33.1* 32.9* 30.8*   MCV 95 97 97 96 96   RDW 13.8 14.1 14.4 14.5 14.3    371 406 413 426     Recent Labs   Lab 20  0330   INR 1.10     Recent Labs   Lab 20  0414 20  0412 20  0428 20  0402 06/10/20  0348   * 102* 99 109* 96       All imaging personally reviewed:  Recent Results (from the past 24 hour(s))   Echocardiogram Complete    Narrative    [id number]  [id number]  [id number]  [id number]^GRIS^YOSHI           [facility],[place]  Echocardiography Laboratory  [address] [place] [id number]     Name: FAINA FORRESTER  MRN: [id number]  : 1980  Study Date: 2020 08:16 AM  Age: 39 yrs  Gender: Male  Patient Location: [facility]  Reason For Study: Cardiac Arrest  Ordering Physician: YOSHI LANDRY  Performed By: Denisse Johnson RDCS     BSA: 2.1 m2  Height: 66 in  Weight: 231 lb  BP: 132/58 mmHg  _____________________________________________________________________________  __        Procedure  Complete Portable Echo Adult. Technically difficult study.Extremely poor  acoustic  windows.  _____________________________________________________________________________  __        Interpretation Summary  VA ECMO at 3.7L/min. Technically difficult study. Extremely poor acoustic  windows.  Severely (EF <30%) reduced left ventricular function on extremely limited  views.  The right ventricle cannot be assessed.  No pericardial effusion is present.  Previous study not available for comparison.  _____________________________________________________________________________  __        Left Ventricle  Left ventricular wall thickness cannot evaluate. Left ventricular size is  normal. Severely (EF <30%) reduced left ventricular function is present. Left  ventricular diastolic function is not assessable. Severe diffuse hypokinesis  is present.     Right Ventricle  The right ventricle cannot be assessed. Right ventricular function cannot be  assessed due to poor image quality.     Mitral Valve  The mitral valve cannot be assessed.        Aortic Valve  The aortic valve opens with each cardiac cycle. On Doppler interrogation,  there is no significant stenosis or regurgitation.     Tricuspid Valve  The tricuspid valve cannot be assessed. Pulmonary artery systolic pressure  cannot be assessed.     Pulmonic Valve  The pulmonic valve cannot be assessed.     Vessels  The aorta root cannot be assessed. The thoracic aorta cannot be assessed.  Venous ECMO cannula is visualized traversing the IVC.     Pericardium  No pericardial effusion is present.     Compared to Previous Study  Previous study not available for comparison.     _____________________________________________________________________________  __                       Report approved by: Ashlee Izquierdo 05/18/2020 09:18 AM                 _____________________________________________________________________________  __                Medications     Current Facility-Administered Medications   Medication     0.9% sodium chloride BOLUS     0.9%  sodium chloride BOLUS     acetaminophen (TYLENOL) tablet 650 mg     acetylcysteine (MUCOMYST) 10 % nebulizer solution 4 mL     artificial tears ophthalmic ointment     aspirin (ASA) chewable tablet 81 mg     atorvastatin (LIPITOR) tablet 10 mg     calcium chloride in  mL intermittent infusion 1 g     cephALEXin (KEFLEX) capsule 500 mg     chlorhexidine (PERIDEX) 0.12 % solution 15 mL     dextrose 10% infusion     glucose gel 15-30 g    Or     dextrose 50 % injection 25-50 mL    Or     glucagon injection 1 mg     furosemide (LASIX) tablet 20 mg     heparin ANTICOAGULANT injection 5,000 Units     heparin lock flush 10 UNIT/ML injection 5-10 mL     heparin lock flush 10 UNIT/ML injection 5-10 mL     HOLD MEDICATION (one time)     ipratropium - albuterol 0.5 mg/2.5 mg/3 mL (DUONEB) neb solution 3 mL     levalbuterol (XOPENEX) neb solution 0.63 mg     levETIRAcetam (KEPPRA) solution 1,000 mg     lidocaine (LMX4) cream     lidocaine 1 % 0.1-1 mL     lisinopril (ZESTRIL) tablet 5 mg     magnesium sulfate 2 g in water intermittent infusion     magnesium sulfate 4 g in 100 mL sterile water (premade)     melatonin tablet 10 mg     metoprolol tartrate (LOPRESSOR) half-tab 25 mg     miconazole (MICATIN) 2 % powder     multivitamins w/minerals (CERTAVITE) liquid 15 mL     naloxone (NARCAN) injection 0.1-0.4 mg     ondansetron (ZOFRAN) injection 4 mg     pantoprazole (PROTONIX) 2 mg/mL suspension 40 mg     potassium chloride (KLOR-CON) Packet 20-40 mEq     potassium chloride 10 mEq in 100 mL intermittent infusion with 10 mg lidocaine     potassium chloride 10 mEq in 100 mL sterile water intermittent infusion (premix)     potassium chloride 20 mEq in 50 mL intermittent infusion     potassium chloride ER (KLOR-CON M) CR tablet 20-40 mEq     prochlorperazine (COMPAZINE) injection 5 mg     QUEtiapine (SEROquel) tablet 25 mg     QUEtiapine (SEROquel) tablet 50 mg     senna-docusate (SENOKOT-S/PERICOLACE) 8.6-50 MG per tablet 1  tablet     simethicone (MYLICON) suspension 40 mg     sodium chloride (NEBUSAL) 3 % neb solution 3 mL     sodium chloride (PF) 0.9% PF flush 10-20 mL     sodium chloride (PF) 0.9% PF flush 3 mL     sodium chloride (PF) 0.9% PF flush 3 mL     sodium phosphate 10 mmol in D5W intermittent infusion     sodium phosphate 15 mmol in D5W intermittent infusion     sodium phosphate 20 mmol in D5W intermittent infusion     sodium phosphate 25 mmol in D5W intermittent infusion     ticagrelor (BRILINTA) tablet 90 mg     vitamin A-D & C drops (TRI-VI-SOL) drops 13 mL     zinc sulfate solution 220 mg                    I have seen and examined the patient with the [facility] team. I agree with the assessment and plan of the note above.I have reviewed pertinent labs.     Jefferson Alanis MD  Interventional Cardiology  Pager: [phone]

## 2020-06-14 NOTE — PLAN OF CARE
ICU End of Shift Summary. See flowsheets for vital signs and detailed assessment.    Changes this shift: Pt more tachycardic this shift, HR up to 130s-140s. MD notified. 250 ml LR bolus given with no response in HR. 500 ml NS bolus ordered and currently infusing. Temps 98-99s. No change in mental status; continues to be restless, not following commands. No emesis overnight. TF increased to 20 ml/hr. Pt with large loose stool. Rectal tube placed to prevent further skin breakdown.     Plan:  Titrate TF to goal as tolerated. Plan to transfer to [facility] when tolerating TFs.

## 2020-06-14 NOTE — PROGRESS NOTES
[facility] Nurse Inpatient Pressure Injury Assessment   Reason for consultation: Evaluate and treat tongue      ASSESSMENT  Pressure Injury: on left top of tongue, hospital acquired    This is a Medical Device Related Pressure Injury (MDRPI) due to ETT  Pressure Injury is Stage Mucosal - stable  Contributing factor of the pressure injury: pressure, microclimate and immobility  Status: Significantly deteriorated now has split tongue 5/27. Stable on 6/7-   6/14- Pt keep clenching his teeth and not following command. So unable to visualize the area. RN reports having hard time with oral care as pt keeps his teeth clamped. Pt has tracheostomy in place and no pressure from ETT on the tongue.  Recommend provider order: ENT now following     TREATMENT PLAN  Left top of tongue mucosal pressure injury: Good oral hygiene and routine repositioning of ETT (ensure tongue is disengaged with every reposition), attempt to not reposition ETT on wound. Offload tubing with rolled towel.    ENT recommendations:   - Keep pressure off the tongue as much as possible. DO NOT allow ETT to rest directly on anterior tongue. Frequently move ETT from side to side to decrease tongue pressure.   - Place vaseline guaze or xeroform on anterior tongue wound at all times. ONLY PLACE ONE LONG PIECE AND ENSURE DISTAL END IS OUT OF THE MOUTH to prevent aspiration of guaze  - Peridex rinses QID  - Can place soft bite block between molars laterally (avoid placing anteriorly). Can use rolled up guaze and tape or obtain an actual bite block from OR/central supplies  - Early extubation/ trach if possible to decrease pressure on tongue  - We will await care conference on Friday to determine if surgical intervention will be necessary vs healing by secondary intention    Orders Updated  [facility] Nurse follow-up plan:weekly  Nursing to notify the Provider(s) and re-consult the [facility] Nurse if wound(s) deteriorates or new skin concern.    Patient History  According to provider  note(s):  Scot Bishop is a 39 year old male who was admitted on 5/17/2020 for cardiac arrest. Pt reportedly had chest pain that started on 5/16/20. He was using Drano on his pipes at home and did not initially seek medical attention for CP and SOB since it said on the box that Drano can cause those symptoms. He took a shower and had syncopal episode after coming out of the shower. EMS was called; initial rhythm asystole. With chest compressions pt eventually had PEA and then eventually had VF in the field. After multiple cycles of epinephrine had ROSC. He was intubated in the field.   Pt was brought to [facility] ED where he regained a pulse and was brought to the Cath Lab for coronary angiogram. Initially, BP was 90s/60s as he was being transported from ED but he had recurrent cardiac arrest on arrival to Cath Lab. ECG showed ST elevation in aVR. He was promptly placed on VA ECMO. He had thrombotic occlusion of proximal LAD and underwent successful aspiration thrombectomy and PCI w/ NAZIA of proximal and mid LAD.     Objective Data  Containment of urine/stool: Indwelling catheter    Current Diet/ Nutrition:  None      Output:   I/O last 3 completed shifts:  In: 800 [NG/GT:350; IV Piggyback:250]  Out: 1310 [Urine:1250; Emesis/NG output:60]    Risk Assessment:   Sensory Perception: 2-->very limited  Moisture: 3-->occasionally moist  Activity: 1-->bedfast  Mobility: 2-->very limited  Nutrition: 3-->adequate  Friction and Shear: 2-->potential problem  Burak Score: 13      Labs:   Recent Labs   Lab 06/14/20  0414  06/12/20  0428  06/08/20  0330   ALBUMIN  --   --  3.0*   < >  --    HGB 10.4*   < > 10.1*   < > 7.9*   INR  --   --   --   --  1.10   WBC 15.3*   < > 14.4*   < > 9.7    < > = values in this interval not displayed.       Physical Exam  Skin inspection: focused tongue    Wound Location:  Left top of tongue                                                                                                                        6/1 6/7-   Date of last Photo 6/7- unable to take better picture despite using tongue depressor as pt unable to stick tongue out and tongue is not edematous either.   Wound History: Nurse had thought WOC was already following this wound, however WOC had only noted bite wound previously on underside of tongue. On 5/22 patient had bite on right side of tongue  5/27 Patient's tongue is now split. RN notified WOC and WOC was coming for routine assessment. Updated team, recommended ENT involvement. Team was already coordinating a care conference for family. Updated nursing manager. Per RN, have been doing all of the interventions recommended including have been offloading ETT tubing with rolled towel.  Patient seen thrashing head side to side. Per RN this has been patient's baseline for the last couple days. Patient also has very strong cough and very strong bite.  WOC requested RT readjust ETT stabilizer to see if we can offload up any higher on tubing. RT, RN, and WOC applied new ETT stabilizer and no noticeable difference in offloading.  Measurements (length x width x depth, in cm) unable to measure how far back wound goes, as pt unable to stick his tongue out.   Wound Base:  Tongue split, also appears to have missing tissue, white tissue has resolved now noted pink mucosa   Palpation of the wound bed: normal   Periwound skin: mucosa  Color: pale  Temperature: normal   Drainage:, none  Description of drainage: none  Odor: none  Pain: facial expression of distress,       Interventions  Current support surface: Standard  Low air loss mattress  Current off-loading measures: Pillows  Repositioning aid: Pillows  Visual inspection of wound(s) completed   Tube Securement: ETT stabilizer  Wound Care: was not done- unable to wrap the xerofoam gauze  Supplies: none  Educated provided: importance of repositioning, plan of  care and wound progress  Education provided to: nurse  Discussed importance of:repositioning every 2 hours, off-loading pressure to wound and their role in pressure injury prevention  Discussed plan of care with Nurse,     Michelle Flores RN, CWOCN

## 2020-06-14 NOTE — PLAN OF CARE
ICU End of Shift Summary. See flowsheets for vital signs and detailed assessment.    Changes this shift: Continues on trach dome at 30% FiO2 with good tolerance. Secretions are minimal but thick tan and patient has a strong cough, appearing to be able to clear airway. He had an emesis once just after receiving some meds and the emesis appeared to be the color of the meds given. Continues to have frequent loose stools and perineal area is excoriated. Antifungal powder used. He seems to resist certain cares with purpose and at times he turns to look toward people near his bed. At times when his name is called he opens eyes but is inconsistent.     Plan: Continue with current plan of care.

## 2020-06-15 ENCOUNTER — APPOINTMENT (OUTPATIENT)
Dept: GENERAL RADIOLOGY | Facility: CLINIC | Age: 40
End: 2020-06-15
Attending: NURSE PRACTITIONER
Payer: MEDICAID

## 2020-06-15 ENCOUNTER — APPOINTMENT (OUTPATIENT)
Dept: CT IMAGING | Facility: CLINIC | Age: 40
End: 2020-06-15
Attending: NURSE PRACTITIONER
Payer: MEDICAID

## 2020-06-15 LAB
ANION GAP SERPL CALCULATED.3IONS-SCNC: 7 MMOL/L (ref 3–14)
BUN SERPL-MCNC: 28 MG/DL (ref 7–30)
CALCIUM SERPL-MCNC: 9.4 MG/DL (ref 8.5–10.1)
CHLORIDE SERPL-SCNC: 107 MMOL/L (ref 94–109)
CO2 SERPL-SCNC: 28 MMOL/L (ref 20–32)
CREAT SERPL-MCNC: 1.14 MG/DL (ref 0.66–1.25)
ERYTHROCYTE [DISTWIDTH] IN BLOOD BY AUTOMATED COUNT: 14 % (ref 10–15)
GFR SERPL CREATININE-BSD FRML MDRD: 79 ML/MIN/{1.73_M2}
GLUCOSE BLDC GLUCOMTR-MCNC: 112 MG/DL (ref 70–99)
GLUCOSE SERPL-MCNC: 132 MG/DL (ref 70–99)
HCT VFR BLD AUTO: 33.4 % (ref 40–53)
HGB BLD-MCNC: 10.4 G/DL (ref 13.3–17.7)
LACTATE BLD-SCNC: 1.5 MMOL/L (ref 0.7–2)
MCH RBC QN AUTO: 29.9 PG (ref 26.5–33)
MCHC RBC AUTO-ENTMCNC: 31.1 G/DL (ref 31.5–36.5)
MCV RBC AUTO: 96 FL (ref 78–100)
PLATELET # BLD AUTO: 325 10E9/L (ref 150–450)
POTASSIUM SERPL-SCNC: 3.3 MMOL/L (ref 3.4–5.3)
RBC # BLD AUTO: 3.48 10E12/L (ref 4.4–5.9)
SODIUM SERPL-SCNC: 142 MMOL/L (ref 133–144)
WBC # BLD AUTO: 13.7 10E9/L (ref 4–11)

## 2020-06-15 PROCEDURE — 25000132 ZZH RX MED GY IP 250 OP 250 PS 637

## 2020-06-15 PROCEDURE — 25000132 ZZH RX MED GY IP 250 OP 250 PS 637: Performed by: STUDENT IN AN ORGANIZED HEALTH CARE EDUCATION/TRAINING PROGRAM

## 2020-06-15 PROCEDURE — 25000128 H RX IP 250 OP 636: Performed by: NURSE PRACTITIONER

## 2020-06-15 PROCEDURE — 27210437 ZZH NUTRITION PRODUCT SEMIELEM INTERMED LITER

## 2020-06-15 PROCEDURE — 99233 SBSQ HOSP IP/OBS HIGH 50: CPT | Mod: 25 | Performed by: INTERNAL MEDICINE

## 2020-06-15 PROCEDURE — 80048 BASIC METABOLIC PNL TOTAL CA: CPT | Performed by: STUDENT IN AN ORGANIZED HEALTH CARE EDUCATION/TRAINING PROGRAM

## 2020-06-15 PROCEDURE — 93010 ELECTROCARDIOGRAM REPORT: CPT | Performed by: INTERNAL MEDICINE

## 2020-06-15 PROCEDURE — 25000132 ZZH RX MED GY IP 250 OP 250 PS 637: Performed by: INTERNAL MEDICINE

## 2020-06-15 PROCEDURE — 74176 CT ABD & PELVIS W/O CONTRAST: CPT

## 2020-06-15 PROCEDURE — 25000132 ZZH RX MED GY IP 250 OP 250 PS 637: Performed by: NURSE PRACTITIONER

## 2020-06-15 PROCEDURE — 25000128 H RX IP 250 OP 636: Performed by: STUDENT IN AN ORGANIZED HEALTH CARE EDUCATION/TRAINING PROGRAM

## 2020-06-15 PROCEDURE — 40000047 ZZH STATISTIC CTO2 CONT OXYGEN TECH TIME EA 90 MIN

## 2020-06-15 PROCEDURE — 40000275 ZZH STATISTIC RCP TIME EA 10 MIN

## 2020-06-15 PROCEDURE — 99207 ZZC APP CREDIT; MD BILLING SHARED VISIT: CPT | Performed by: NURSE PRACTITIONER

## 2020-06-15 PROCEDURE — 25000125 ZZHC RX 250: Performed by: NURSE PRACTITIONER

## 2020-06-15 PROCEDURE — 20000004 ZZH R&B ICU UMMC

## 2020-06-15 PROCEDURE — 00000146 ZZHCL STATISTIC GLUCOSE BY METER IP

## 2020-06-15 PROCEDURE — 85027 COMPLETE CBC AUTOMATED: CPT | Performed by: STUDENT IN AN ORGANIZED HEALTH CARE EDUCATION/TRAINING PROGRAM

## 2020-06-15 PROCEDURE — 99207 ZZC NO CHARGE LOS: CPT | Performed by: INTERNAL MEDICINE

## 2020-06-15 PROCEDURE — 83605 ASSAY OF LACTIC ACID: CPT | Performed by: NURSE PRACTITIONER

## 2020-06-15 PROCEDURE — 71045 X-RAY EXAM CHEST 1 VIEW: CPT

## 2020-06-15 RX ORDER — HEPARIN SODIUM 5000 [USP'U]/.5ML
5000 INJECTION, SOLUTION INTRAVENOUS; SUBCUTANEOUS EVERY 8 HOURS
Qty: 3 VIAL | Refills: 0 | Status: CANCELLED
Start: 2020-06-15

## 2020-06-15 RX ORDER — QUETIAPINE FUMARATE 50 MG/1
50 TABLET, FILM COATED ORAL AT BEDTIME
Qty: 30 TABLET | Refills: 0 | Status: CANCELLED
Start: 2020-06-15

## 2020-06-15 RX ORDER — AMOXICILLIN 250 MG
1 CAPSULE ORAL DAILY PRN
Qty: 30 TABLET | Refills: 0 | Status: CANCELLED
Start: 2020-06-15

## 2020-06-15 RX ORDER — ASPIRIN 81 MG/1
81 TABLET, CHEWABLE ORAL DAILY
Qty: 30 TABLET | Refills: 0 | Status: CANCELLED
Start: 2020-06-15

## 2020-06-15 RX ORDER — PHENOL 1.4 %
10 AEROSOL, SPRAY (ML) MUCOUS MEMBRANE AT BEDTIME
Qty: 30 TABLET | Refills: 0 | Status: CANCELLED
Start: 2020-06-15

## 2020-06-15 RX ORDER — ACETAMINOPHEN 325 MG/1
650 TABLET ORAL EVERY 6 HOURS PRN
Qty: 30 TABLET | Refills: 0 | Status: CANCELLED
Start: 2020-06-15

## 2020-06-15 RX ORDER — LEVALBUTEROL INHALATION SOLUTION 0.63 MG/3ML
0.63 SOLUTION RESPIRATORY (INHALATION) EVERY 4 HOURS PRN
Qty: 5 ML | Refills: 0 | Status: CANCELLED
Start: 2020-06-15

## 2020-06-15 RX ORDER — CHLORHEXIDINE GLUCONATE ORAL RINSE 1.2 MG/ML
15 SOLUTION DENTAL 4 TIMES DAILY
Qty: 15 ML | Refills: 0 | Status: CANCELLED
Start: 2020-06-15

## 2020-06-15 RX ORDER — ONDANSETRON 2 MG/ML
4 INJECTION INTRAMUSCULAR; INTRAVENOUS EVERY 8 HOURS PRN
Status: DISCONTINUED | OUTPATIENT
Start: 2020-06-15 | End: 2020-06-16

## 2020-06-15 RX ORDER — METOPROLOL TARTRATE 25 MG/1
25 TABLET, FILM COATED ORAL 2 TIMES DAILY
Qty: 30 TABLET | Refills: 0 | Status: CANCELLED
Start: 2020-06-15

## 2020-06-15 RX ORDER — FUROSEMIDE 20 MG
20 TABLET ORAL DAILY
Qty: 30 TABLET | Refills: 0 | Status: CANCELLED
Start: 2020-06-15

## 2020-06-15 RX ORDER — LISINOPRIL 5 MG/1
5 TABLET ORAL DAILY
Qty: 30 TABLET | Refills: 0 | Status: CANCELLED
Start: 2020-06-15

## 2020-06-15 RX ORDER — QUETIAPINE FUMARATE 25 MG/1
50 TABLET, FILM COATED ORAL
Status: COMPLETED | OUTPATIENT
Start: 2020-06-15 | End: 2020-06-15

## 2020-06-15 RX ORDER — IPRATROPIUM BROMIDE AND ALBUTEROL SULFATE 2.5; .5 MG/3ML; MG/3ML
3 SOLUTION RESPIRATORY (INHALATION) EVERY 4 HOURS PRN
Qty: 5 VIAL | Refills: 0 | Status: CANCELLED
Start: 2020-06-15

## 2020-06-15 RX ORDER — ATORVASTATIN CALCIUM 10 MG/1
10 TABLET, FILM COATED ORAL EVERY EVENING
Qty: 30 TABLET | Refills: 0 | Status: CANCELLED
Start: 2020-06-15

## 2020-06-15 RX ORDER — QUETIAPINE FUMARATE 25 MG/1
25 TABLET, FILM COATED ORAL DAILY
Qty: 30 TABLET | Refills: 0 | Status: CANCELLED
Start: 2020-06-15

## 2020-06-15 RX ORDER — ACETYLCYSTEINE 100 MG/ML
4 SOLUTION ORAL; RESPIRATORY (INHALATION) EVERY 4 HOURS PRN
Qty: 5 ML | Refills: 0 | Status: CANCELLED
Start: 2020-06-15

## 2020-06-15 RX ORDER — ONDANSETRON 2 MG/ML
4 INJECTION INTRAMUSCULAR; INTRAVENOUS EVERY 8 HOURS PRN
Qty: 100 ML | Refills: 0 | Status: CANCELLED
Start: 2020-06-15

## 2020-06-15 RX ADMIN — Medication 13 ML: at 07:23

## 2020-06-15 RX ADMIN — Medication 220 MG: at 07:23

## 2020-06-15 RX ADMIN — Medication 40 MG: at 21:37

## 2020-06-15 RX ADMIN — MELATONIN TAB 3 MG 10 MG: 3 TAB at 21:33

## 2020-06-15 RX ADMIN — FUROSEMIDE 20 MG: 20 TABLET ORAL at 07:20

## 2020-06-15 RX ADMIN — ONDANSETRON 4 MG: 2 INJECTION INTRAMUSCULAR; INTRAVENOUS at 06:29

## 2020-06-15 RX ADMIN — PIPERACILLIN AND TAZOBACTAM 4.5 G: 4; .5 INJECTION, POWDER, FOR SOLUTION INTRAVENOUS at 11:44

## 2020-06-15 RX ADMIN — TICAGRELOR 90 MG: 90 TABLET ORAL at 07:20

## 2020-06-15 RX ADMIN — LISINOPRIL 5 MG: 5 TABLET ORAL at 07:20

## 2020-06-15 RX ADMIN — MULTIVITAMIN 15 ML: LIQUID ORAL at 07:20

## 2020-06-15 RX ADMIN — ATORVASTATIN CALCIUM 10 MG: 10 TABLET, FILM COATED ORAL at 21:33

## 2020-06-15 RX ADMIN — Medication 13 ML: at 21:37

## 2020-06-15 RX ADMIN — QUETIAPINE FUMARATE 50 MG: 25 TABLET ORAL at 01:39

## 2020-06-15 RX ADMIN — LEVETIRACETAM 1000 MG: 100 SOLUTION ORAL at 21:37

## 2020-06-15 RX ADMIN — HEPARIN SODIUM 5000 UNITS: 5000 INJECTION, SOLUTION INTRAVENOUS; SUBCUTANEOUS at 15:37

## 2020-06-15 RX ADMIN — ACETAMINOPHEN 650 MG: 325 TABLET, FILM COATED ORAL at 21:32

## 2020-06-15 RX ADMIN — QUETIAPINE FUMARATE 25 MG: 25 TABLET ORAL at 07:21

## 2020-06-15 RX ADMIN — QUETIAPINE FUMARATE 50 MG: 50 TABLET ORAL at 21:33

## 2020-06-15 RX ADMIN — ONDANSETRON 4 MG: 2 INJECTION INTRAMUSCULAR; INTRAVENOUS at 21:35

## 2020-06-15 RX ADMIN — CHLORHEXIDINE GLUCONATE 0.12% ORAL RINSE 15 ML: 1.2 LIQUID ORAL at 07:20

## 2020-06-15 RX ADMIN — PIPERACILLIN AND TAZOBACTAM 4.5 G: 4; .5 INJECTION, POWDER, FOR SOLUTION INTRAVENOUS at 18:41

## 2020-06-15 RX ADMIN — MICONAZOLE NITRATE: 20 POWDER TOPICAL at 07:21

## 2020-06-15 RX ADMIN — PIPERACILLIN AND TAZOBACTAM 4.5 G: 4; .5 INJECTION, POWDER, FOR SOLUTION INTRAVENOUS at 00:16

## 2020-06-15 RX ADMIN — HEPARIN SODIUM 5000 UNITS: 5000 INJECTION, SOLUTION INTRAVENOUS; SUBCUTANEOUS at 07:20

## 2020-06-15 RX ADMIN — CHLORHEXIDINE GLUCONATE 0.12% ORAL RINSE 15 ML: 1.2 LIQUID ORAL at 21:35

## 2020-06-15 RX ADMIN — ACETAMINOPHEN 650 MG: 325 TABLET, FILM COATED ORAL at 05:01

## 2020-06-15 RX ADMIN — Medication 40 MG: at 07:24

## 2020-06-15 RX ADMIN — Medication 25 MG: at 07:20

## 2020-06-15 RX ADMIN — ASPIRIN 81 MG CHEWABLE TABLET 81 MG: 81 TABLET CHEWABLE at 07:20

## 2020-06-15 RX ADMIN — HEPARIN SODIUM 5000 UNITS: 5000 INJECTION, SOLUTION INTRAVENOUS; SUBCUTANEOUS at 00:16

## 2020-06-15 RX ADMIN — POTASSIUM CHLORIDE 20 MEQ: 29.8 INJECTION, SOLUTION INTRAVENOUS at 07:34

## 2020-06-15 RX ADMIN — Medication 25 MG: at 21:33

## 2020-06-15 RX ADMIN — LEVETIRACETAM 1000 MG: 100 SOLUTION ORAL at 07:23

## 2020-06-15 RX ADMIN — MICONAZOLE NITRATE: 20 POWDER TOPICAL at 21:34

## 2020-06-15 RX ADMIN — CHLORHEXIDINE GLUCONATE 0.12% ORAL RINSE 15 ML: 1.2 LIQUID ORAL at 11:44

## 2020-06-15 RX ADMIN — CHLORHEXIDINE GLUCONATE 0.12% ORAL RINSE 15 ML: 1.2 LIQUID ORAL at 15:37

## 2020-06-15 RX ADMIN — POTASSIUM CHLORIDE 20 MEQ: 29.8 INJECTION, SOLUTION INTRAVENOUS at 05:11

## 2020-06-15 RX ADMIN — TICAGRELOR 90 MG: 90 TABLET ORAL at 21:33

## 2020-06-15 RX ADMIN — ONDANSETRON 4 MG: 2 INJECTION INTRAMUSCULAR; INTRAVENOUS at 13:38

## 2020-06-15 RX ADMIN — PIPERACILLIN AND TAZOBACTAM 4.5 G: 4; .5 INJECTION, POWDER, FOR SOLUTION INTRAVENOUS at 06:29

## 2020-06-15 ASSESSMENT — ACTIVITIES OF DAILY LIVING (ADL)
ADLS_ACUITY_SCORE: 17
ADLS_ACUITY_SCORE: 18
ADLS_ACUITY_SCORE: 17
ADLS_ACUITY_SCORE: 18
ADLS_ACUITY_SCORE: 17
ADLS_ACUITY_SCORE: 18

## 2020-06-15 ASSESSMENT — MIFFLIN-ST. JEOR: SCORE: 1706.75

## 2020-06-15 NOTE — PROGRESS NOTES
[facility] INFECTIOUS DISEASES PROGRESS NOTE     Patient:  Scot Bishop   YOB: 1980, MRN: [id number]  Date of Visit: 06/15/2020  Date of Admission: 5/17/2020  Consult Requester: Invasive, Cardiologist, *          Assessment and Recommendations:   ID Problem List:  1. Fever, recurrent  2. Leukocytosis, stable   3. Acinetobacter (not baumannii) on sputum culture   4. Blood culture with MRSE  5. Right groin fluid collection  6. Acute respiratory failure  7. STEMI with PEA/VF out of hospital arrest s/p NAZIA to LAD  8. Hypoxic brain injury  9. VA-ECMO (5/17-5/19)    Recommendations:  1. Continue empiric Zosyn to complete 7 day course (6/14-6/21)    Discussion:  Scot Bishop is a 39 year old male with no significant known past medical history who presented to [facility] on 5/17/20 after a PEA/V Fib arrest and STEMI. Was taken to the cath lab emergently and had NAZIA placed to LAD, found to have LAD thrombotic occlusion s/p successful aspiration thrombectomy, was cannulated for VA ECMO (5/17-5/19). ID was consulted on 6/3 due to fevers of uncertain etiology despite antibiotics.     Previously have not noticed a strong correlation between starting/stopping antibiotics and changes to fever curve. No clear source of infection has been identified, although he has grown Acinetobacter (not baumanni) on multiple sputum cultures  (5/20, 5/22, 5/27, 6/2, 6/14). Sputum 6/2 also grew Candida. 1/2 blood cultures 6/1 with MRSE which is thought to reflect contamination as all subsequent BC have been negative. Other possible sources of infection considered include tongue as he has had tongue ulceration/split, although he has been followed by [facility] and tongue has not appeared infectious.      Imaging has also been largely non-revealing as well. CT chest 5/17 showed lungs with effusions c/w and atelectasis/consolidation in setting of recent arrest. CXR on 6/3 with increased LLL opacity. CT Chest/abd/pelvis on 6/3 with some  GGO though no consolidation, and fluid collection in right groin with reactive lymphadenopathy. Subsequent US showed fluid collections and no evidence of pseudoaneurysm. Hematoma vs abscess though at this point favor hematoma.     Unclear source of fever. May have had aspiration vs oral source of infection with existing tongue aspiration. Fever curve improved since restarting Zosyn on 6/14, so would continue to complete empiric course of 7 days. Hematoma one potential source of fevers that has yet to be evaluated, although groin appears non-infectious. Central fevers remain in the differential given hypoxic ischemic brain injury, however, he was afebrile 6/5-6/13. During that time Zosyn was stopped on 6/10 and he was switched to cephalexin. Zosyn now resumed since 6/14 with recurrence of fever up to 101.1. Unclear that changes to antibiotics have made substantial impact on fever curve.      Michelle Canales PA-C   Infectious Diseases  Pager: [phone]  06/15/2020    Physician Attestation   I, Gary Knutson, saw and evaluated Scot Bishop as part of a shared visit.  I have reviewed and discussed with the advanced practice provider their history, physical and plan.    I personally reviewed the vital signs, medications, labs and imaging.    My key history or physical exam findings:  chart reviewed. recurrent fevers. defeversed with Pip/Tazobactam . Sputum grew Acinetobacter sp again. possible colonization of upper airway, but cannot rule out aspiration. blood tinged fluid from trach. opens eyes, but does not response. afebrile now. has diarrhea but on Tube feeding , now held due to vomiting.  C.diff was negative on 6/14/20.     Key management decisions made by me:   - continue Pip/Tazobactam  - follow-up CT chest/abd/pelvis       Gary Knutson  Date of Service (when I saw the patient): 06/15/20           Interval History and Events:   Tm 101.1 on 6/14, restarted on Zosyn. Sputum with  "probable Acinetobacter. C diff negative. BC NGTD         Antimicrobial Treatment:   Current:  - Zosyn restarted 6/14; (6/3-6/10)     Prior:  - Unasyn (5/29-6/3)  - Meropenem (5/22-5/29)  - Zosyn (5/17-5/22)  - Vancomycin (6/3-6/7; 5/17-5/23)  - Cephalexin 6/11-6/14          Review of Systems:   Unable to complete ROS as not answering questions.         HPI:   Adopted from initial consult note on 6/3:    Scot Bishop is a 39 year old male with no significant known past medical history who presented to [facility] on 5/17/20 after a PEA/V Fib arrest and STEMI. Was taken to the cath lab emergently and had NAZIA placed to LAD, found to have LAD thrombotic occlusion s/p successful aspiration thrombectomy, was cannulated for VA ECMO (5/17-5/19). Per H&P \"Pt reportedly had chest pain that started on 5/16/20. He was using Drano on his pipes at home and did not initially seek medical attention for CP and SOB since it said on the box that Drano can cause those symptoms. He took a shower and had syncopal episode after coming out of the shower. EMS was called; initial rhythm asystole. With chest compressions pt eventually had PEA and then eventually had VF in the field. After multiple cycles of epinephrine had ROSC. He was intubated in the field.\" Concern for diffuse anoxic brain injury, neurology has been following. MRI (5/26/20) with difuse acute/subacute infarcts c/w hypoxic ischemic brain injury and diffuse microhemorrhage likely ECMO related. Also with Acute hypoxic respiratory failure and Acinetobacter (naut baumanni) on sputum cultures. Fever to 101.5F on 5/22 and has continued to have intermittent fevers since, temperature to 102F on 5/28, has been persistently febrile >100.6 since 6/1 despite scheduled tylenol. Wound care RN and ENT have been following for tongue ulceration and split.     Patient has no known medical history and unclear if he was feeling sick prior to chest pain onset on 5/16. 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 He lives with his significant other and has joint custody of two children.         Physical Examination:   Temp:  [97.1  F (36.2  C)-101.1  F (38.4  C)] 97.1  F (36.2  C)  Heart Rate:  [101-131] 101  Resp:  [11-22] 16  BP: ()/(47-80) 93/54  FiO2 (%):  [30 %] 30 %  SpO2:  [96 %-100 %] 100 %    I/O last 3 completed shifts:  In: 2585 [I.V.:585; NG/GT:590; IV Piggyback:500]  Out: 1495 [Urine:1395; Stool:100]    Vitals:    06/09/20 0600 06/11/20 0400 06/13/20 0000 06/14/20 0200   Weight: 94.1 kg (207 lb 7.3 oz) 85.4 kg (188 lb 4.4 oz) 86.8 kg (191 lb 5.8 oz) 85.5 kg (188 lb 7.9 oz)    06/15/20 0000   Weight: 85.4 kg (188 lb 4.4 oz)       Constitutional: Adult male seen lying in bed  HEENT: NC/AT, pupils equal and round, sclera clear, conjunctiva normal, unable to exam mouth  Respiratory: No increased work of breathing, CTAB, no crackles or wheezing.  Cardiovascular: RRR, no murmur noted.   GI: Normal bowel sounds, soft, non-distended.  Skin: Warm, dry, well-perfused. Right groin incision c/d/i, no erythema, small amount of serosanguinous fluid on bandage, no purulence  Musculoskeletal: Extremities grossly normal, non-tender, no edema.   Neurologic: Non-responsive, does not follow commands. Moves extremities and opens eyes spontaneously           Medications:       aspirin  81 mg Per Feeding Tube Daily     atorvastatin  10 mg Oral QPM     chlorhexidine  15 mL Swish & Spit 4x Daily     furosemide  20 mg Oral Daily     heparin ANTICOAGULANT  5,000 Units Subcutaneous Q8H Count includes the [facility]     heparin lock flush  5-10 mL Intracatheter Q24H     levETIRAcetam  1,000 mg Oral or Feeding Tube BID     lisinopril  5 mg Oral Daily     melatonin  10 mg Oral At Bedtime     metoprolol tartrate  25 mg Oral BID     miconazole   Topical BID     multivitamins w/minerals  15 mL Per Feeding Tube Daily     pantoprazole  40 mg Oral or Feeding Tube BID     piperacillin-tazobactam  4.5 g Intravenous Q6H      QUEtiapine  25 mg Oral Daily     QUEtiapine  50 mg Oral or Feeding Tube At Bedtime     sodium chloride (PF)  3 mL Intracatheter Q8H     ticagrelor  90 mg Oral or Feeding Tube BID     vitamin A-D & C drops  13 mL Per G Tube BID     zinc sulfate  220 mg Oral Daily       Antiinfectives:  Anti-infectives (From now, onward)    Start     Dose/Rate Route Frequency Ordered Stop    06/14/20 1200  piperacillin-tazobactam (ZOSYN) 4.5 g vial to attach to  mL bag      4.5 g  over 30 Minutes Intravenous EVERY 6 HOURS 06/14/20 1108            Infusions/Drips:    dextrose 1,000 mL (06/09/20 0737)            Laboratory Data:     Microbiology:  Culture Micro   Date Value Ref Range Status   06/14/2020 Culture in progress  Preliminary   06/14/2020 No growth after 19 hours  Preliminary   06/14/2020 No growth after 19 hours  Preliminary   06/13/2020 (A)  Preliminary    Moderate growth  Acinetobacter species, not baumannii  Susceptibility testing in progress  Identification obtained by MALDI-TOF mass spectrometry research use only database. Test   characteristics determined and verified by the [facility] Diagnostic Laboratory   ([facility]) [place], [place].     06/13/2020 (A)  Preliminary    Light growth  Candida albicans / dubliniensis  Candida albicans and Candida dubliniensis are not routinely speciated  Susceptibility testing not routinely done     06/13/2020 No growth after 2 days  Preliminary   06/13/2020 No growth after 2 days  Preliminary   06/04/2020 No growth  Final   06/03/2020 No growth  Final   06/02/2020 (A)  Final    Light growth  Acinetobacter species, not baumannii  Identification obtained by MALDI-TOF mass spectrometry research use only database. Test   characteristics determined and verified by the Infectious Diseases Diagnostic Laboratory   ([facility]) [place], [place].     06/02/2020 (A)  Final    Light growth  Candida albicans / dubliniensis  Candida albicans and Candida dubliniensis are not routinely  speciated  Susceptibility testing not routinely done         Metabolic Studies     Recent Labs   Lab Test 06/15/20  0409 06/14/20  0414 06/13/20  0412 06/12/20  1437  06/10/20  0348  05/25/20  0448    141 140  --    < > 140   < > 154*   POTASSIUM 3.3* 3.7 3.6  --    < > 3.4   < > 3.6   CHLORIDE 107 105 106  --    < > 106   < > 123*   CO2 28 29 28  --    < > 29   < > 23   ANIONGAP 7 7 6  --    < > 6   < > 8   BUN 28 27 22  --    < > 20   < > 38*   CR 1.14 1.04 0.88  --    < > 0.83   < > 1.09   GFRESTIMATED 79 89 >90  --    < > >90   < > 84   * 122* 102*  --    < > 96   < > 104*   A1C  --   --   --   --   --   --   --  5.2   TEN 9.4 10.1 9.9  --    < > 8.7   < > 8.6   PHOS  --   --   --   --   --  3.6   < > 3.0   MAG  --   --   --   --   --  1.9   < > 2.2   LACT  --   --   --  0.7  --   --    < > 1.1    < > = values in this interval not displayed.       Hepatic Studies    Recent Labs   Lab Test 06/12/20 0428 06/09/20  0720 06/04/20 0416  05/20/20  0356   BILITOTAL 0.5 0.5 0.6   < > 0.6   ALKPHOS 144 148 127   < > 44   ALBUMIN 3.0* 2.9* 3.0*   < > 2.6*   AST 45 52* 57*   < > 382*   ALT 84* 112* 120*   < > 461*   LDH  --   --   --   --  1,181*    < > = values in this interval not displayed.       Pancreatitis testing    Recent Labs   Lab Test 06/12/20 0428   LIPASE 407*   TRIG 255*       Hematology Studies      Recent Labs   Lab Test 06/15/20  0409 06/14/20  0414 06/13/20  0412  06/02/20  1059   WBC 13.7* 15.3* 13.2*   < > 14.1*   ANEU  --   --   --   --  10.0*   ALYM  --   --   --   --  2.0   TANA  --   --   --   --  1.3   AEOS  --   --   --   --  0.6   HGB 10.4* 10.4* 10.4*   < > 9.0*   HCT 33.4* 34.0* 33.6*   < > 29.3*    396 371   < > 642*    < > = values in this interval not displayed.       Arterial Blood Gas Testing    Recent Labs   Lab Test 06/06/20  0228   PH 7.44   PCO2 35   PO2 154*   HCO3 24   O2PER 30.0        Urine Studies     Recent Labs   Lab Test 06/14/20  1221 06/03/20  1207  06/01/20  2143   URINEPH 5.0 5.5 6.5   NITRITE Negative Negative Negative   LEUKEST Negative Negative Trace*   WBCU <1 0 3       Vancomycin Levels     Recent Labs   Lab Test 06/07/20  0001 05/20/20  1748   VANCOMYCIN 13.5 6.9       Last check of C difficile  C Diff Toxin B PCR   Date Value Ref Range Status   06/14/2020 Negative NEG^Negative Final     Comment:     Negative: C. difficile target DNA sequences NOT detected, presumed negative   for C.difficile toxin B or the number of bacteria present may be below the   limit of detection for the test.  FDA approved assay performed using [employer] real-time PCR.  A negative result does not exclude actual disease due to C. difficile and may   be due to improper collection, handling and storage of the specimen or the   number of organisms in the specimen is below the detection limit of the assay.              Imaging:     Results for orders placed or performed during the hospital encounter of 05/17/20   CT Head w/o Contrast    Narrative    CT HEAD W/O CONTRAST 5/17/2020 3:57 PM    Provided History: Altered level of consciousness (LOC), unexplained    Comparison: None.    Technique: Using multidetector thin collimation helical acquisition  technique, axial, coronal and sagittal CT images from the skull base  to the vertex were obtained without intravenous contrast.     Findings:  Images are moderately limited due to venous contrast  contamination due to prior contrast administration.  No intracranial mass effect, or midline shift. The ventricles are  proportionate to the cerebral sulci. The gray to white matter  differentiation of the cerebral hemispheres is preserved. The basal  cisterns are patent.    Scattered mucosal thickening within the paranasal sinuses and complete  opacification of the right nasal cavity is of uncertain clinical  significance in this patient with intubation. The mastoid air cells  are clear.       Impression    Impression: No acute  intracranial pathology.    I have personally reviewed the examination and initial interpretation  and I agree with the findings.    SACHA SOTELO MD   CT Chest Abdomen Pelvis w/o Contrast    Narrative    EXAMINATION: CT CHEST ABDOMEN PELVIS W/O CONTRAST, 5/17/2020 4:02 PM    TECHNIQUE:  Helical CT images from the lung bases through the  symphysis pubis were obtained without IV contrast.     COMPARISON: None    HISTORY: Status post cardiac arrest    FINDINGS:  LINES and TUBES: Gastric tube tip is coiled within the stomach. Right  common femoral vein ECMO cannula tip terminates in the superior  cavoatrial junction. Right common femoral arterial cannula tip  terminates in the junction of the right aortoiliac bifurcation. Left  common femoral venous catheter tip terminates in the proximal IVC.  Left common femoral artery approach balloon pump inferior metallic  marker in the infrarenal abdominal aorta just distal to the renal  artery origins and the proximal metallic marker is in the proximal  descending thoracic aorta.    CHEST:  LUNGS: Endotracheal tube tip terminates in the mid thoracic trachea  the central tracheobronchial tree is patent and. Debris opacifying of  the right lower lobar pulmonary bronchi. Dense bilateral dependent  pulmonary consolidations with air bronchograms. Mild hazy groundglass  opacities seen anteriorly to the dependent consolidations. No large  pulmonary mass. No interlobular septal thickening. No pneumothorax or  pleural effusion.    MEDIASTINUM: Unremarkable thyroid the ascending aorta and main  pulmonary artery are normal in caliber. Severe atherosclerotic  calcifications of the left anterior descending coronary artery. No  pericardial effusion. No anterior mediastinal hematoma. No enlarged  thoracic lymph nodes.     ABDOMEN/PELVIS:  LIVER: Unremarkable noncontrast appearance of the liver.  GALLBLADDER: Within normal limits.  PANCREAS: 3 mm round hypodensity within the pancreatic tail  likely  representing pancreatic cyst or IPMN. No suspicious pancreatic mass.  No pancreatic ductal dilation.  SPLEEN: Within normal limits.  ADRENAL GLANDS: Within normal limits.  URINARY TRACT: Kidneys are normal in size and position without  suspicious renal mass. Contrast opacification of the renal calyces and  pelvis secondary to recent coronary angiogram. Contrast opacification  of the ureters bilaterally without focal defects. The urinary bladder  is unremarkable with Silva catheter in place and balloon inflated.  REPRODUCTIVE ORGANS: Unremarkable appearance of the prostate and  seminal vesicles  BOWEL: Dilated fluid-filled loops of small bowel measuring up to 3.0  cm.  No focal transition point. The large bowel is normal in caliber.  No significant inflammatory change about the loops of bowel.    PERITONEUM/FLUID: No free fluid or air in the abdomen or pelvis.    VESSELS: No aneurysmal dilation of the infrarenal abdominal aorta.  Vascular patency cannot be assessed.    LYMPH NODES: No lymphadenopathy in the abdomen or pelvis.    BONES/SOFT TISSUES: Mild compression type deformities of T7-T9. No  suspicious osseous lesions.. Soft tissue fat stranding in the right  groin secondary to line placement.      Impression    IMPRESSION:   1. Dependent bilateral pleural effusions with associated  atelectasis/consolidation, possible aspiration in the setting of  recent cardiac arrest.  2. Borderline dilated fluid-filled loops of small bowel measuring up  to 3.0 cm, likely result of hypotension.   3. Support devices are in appropriate position.  4. 3 mm round hypodensity in the pancreatic tail favor to represent  cyst or IPMN. Consider nonemergent follow-up MR.  5. Diffuse hepatic steatosis.    I have personally reviewed the examination and initial interpretation  and I agree with the findings.    CRISTINA MATTSON MD   XR Chest Port 1 View    Narrative    EXAMINATION:  XR Chest one  5/17/2020 4:47 PM.    COMPARISON:  Same-day CT at 1549 hours.    HISTORY:  Endotracheal tube placement. ECMO cannula placement    FINDINGS: Portable view of the chest. Endotracheal tube tip projects  over the proximal/mid trachea. Esophageal temperature probe projects  over the proximal esophagus. Gastric tube tip and sidehole project  over the gastric body. Inferior approach ECMO cannula projects over  the right atrium. Intra-aortic balloon pump metallic marker projects  over the proximal descending thoracic aorta at the level of the  chaim. Pacer pad projects over the left hemithorax. Linear metallic  densities project over the lateral right hemithorax, favored to BE  external to patient as these were not demonstrated on comparison CT  and are atypical in appearance.    Midline trachea. Heart is within normal limits. Pulmonary vasculature  is distinct. Minimally increased haziness throughout bilateral lung  fields compatible with known dependent consolidations. No pleural  effusion or pneumothorax. Upper abdomen is unremarkable.      Impression    IMPRESSION:   1. Endotracheal tube tip projects over the proximal/mid trachea.  2. Inferior approach ECMO cannula tip projects over the high right  atrium.  3. Intra-aortic balloon pump metallic marker projects over the  proximal descending thoracic aorta in appropriate position.  4. Bilateral hazy pulmonary opacities compatible with bilateral  dependent atelectasis as seen on same-day CT.    I have personally reviewed the examination and initial interpretation  and I agree with the findings.    CRISTINA MATTSON MD   XR Chest Port 1 View    Narrative    XR CHEST PORT 1 VW  5/18/2020 1:48 AM    History:  Check endotracheal tube placement and ECLS cannula  placement. DO NOT log-roll patient.  Place film under patient using  patient safety handling process..     Comparison: Chest radiograph dated 5/17/2020    Findings:   Supine portable AP chest radiograph. Endotracheal tube with the tip  projects 4.5 cm  above chaim. Intra-aortic pump metallic clip at the  level of chaim, unchanged. Stable gastric tube and inferior ECMO  catheter. Temperature probe with the tip projects over mid esophagus.    Cardiac silhouette is at upper normal limit. Pulmonary vasculature is  relatively distinct. No significant change in bilateral interstitial  and airspace opacities. Low lung volumes. No pneumothorax or  significant pleural effusion.      Impression    IMPRESSION:  1.  Temperature probe is advanced with the tip projects over mid  esophagus. Otherwise stable supportive lines and tubes.  2.  No significant change in bilateral interstitial and airspace  opacities, which may represent pulmonary edema, atelectasis versus  infection.    I have personally reviewed the examination and initial interpretation  and I agree with the findings.    DARWIN BRUNO MD   US Lower Extremity Arterial Duplex Bilateral    Narrative    Exam: Duplex ultrasound of bilateral lower extremity arteries dated  5/17/2020 9:23 PM     Clinical information: Baseline to assess blood flow to Lower  Extremities (VA ECMO)     Comparison: None    Technique: Grayscale (B-mode), color Doppler, and duplex spectral  Doppler ultrasound of the lower extremity arteries. Velocity  measurements obtained with angle correction of 60 degrees or less.    Findings:     Right lower extremity:     Common femoral artery: ECMO present  Profunda femoral artery: Obscured  Proximal SFA: Velocity: 34 cm/sec. Waveforms: Monophasic  Mid SFA:Velocity: Obscured  Distal SFA: Velocity: Obscured  Popliteal artery: Velocity: 33 cm/sec. Waveforms: Monophasic    PTA ankle: Velocity: 20 cm/sec. Waveforms: Monophasic  CATRACHITA ankle at mid calf: Velocity: 16 cm/sec. Waveforms: Monophasic    Left lower extremity:    Common femoral artery: Bandage covered  Deep femoral artery: Obscured  Proximal SFA: Obscured  Mid SFA: Velocity: 97 cm/sec. Waveforms: Biphasic  Distal SFA: Velocity: 84 cm/sec.  Waveforms: Biphasic    Popliteal artery, proximal: Velocity: 50 cm/sec. Waveforms: Biphasic      PTA ankle: Velocity: 38 cm/sec. Waveforms: Biphasic  CATRACHITA ankle: Velocity: 26 cm/sec. Waveforms: Biphasic      Impression    Impression:     1. Right leg: Limited evaluation due to multifocal bandage coverage  and obscured vessels.  The visualized proximal superficial femoral,  popliteal, posterior tibia and anterior tibia arteries are patent  without evidence of hemodynamically significant stenosis.    2. Left leg: Limited evaluation due to multifocal bandage coverage and  obscured vessels.  The visualized mid and distal superficial femoral,  popliteal, posterior tibia and anterior tibia arteries are patent  without evidence of hemodynamically significant stenosis.    Guidelines:    [facility] duplex criteria for lower limb arterial  occlusive disease    Percent stenosis:     Normal (1-19%): Peak systolic velocity (cm/s): <150, End-diastolic  velocity (cm/s): <40, Velocity ratio (Vr): <1.5, Distal arterial  waveform: Triphasic    20-49%: Peak systolic velocity (cm/s): 150-200, End-diastolic velocity  (cm/s): <40, Velocity ratio (Vr): 1.5-2.0, Distal arterial waveform:  Triphasic    50-75%: Peak systolic velocity (cm/s): 200-300, End-diastolic velocity  (cm/s): <90, Velocity ratio (Vr): 2.0-3.9, Distal arterial waveform:  Poststenotic turbulence distal to stenosis, monophasic distal waveform    >75%: Peak systolic velocity (cm/s): >300, End-diastolic velocity  (cm/s): <90, Velocity ratio (Vr): >4.0, Distal arterial waveform:  Dampened distal waveform and low PSV/EDV* in the stenosis    Occlusion: Absent flow by color Doppler/pulsed Doppler spectral  analysis; length of occlusion estimated from distance between exit and  reentry collateral arteries    *PSV = peak systolic velocity, EDV = end-diastolic velocity  http://[email or web address]  html    I have  personally reviewed the examination and initial interpretation  and I agree with the findings.    YUMI OLEARY MD   US Carotid Bilateral    Narrative    Exam: Bilateral carotid duplex Doppler ultrasound dated 5/17/2020 9:23  PM    Clinical history: Cardiac Arrest on ECMO    Comparison Study: None    Technique: Grayscale (B-mode) and duplex and spectral Doppler  ultrasound of the extracranial internal carotid, external carotid,  vertebral artery origins, right brachiocephalic/subclavian and left  subclavian arteries. Velocity measurements obtained with angle  correction at or less than 60 degrees.    Findings:    Right side:     Plaque Morphology: Predominantly echogenic, Smooth    Mid CCA: 57 cm/sec     External CA: 59 cm/sec       Proximal ICA: 38 cm/sec     Mid ICA: 32 cm/sec     Distal ICA: 44 cm/sec       Vertebral artery: Antegrade  ICA/CCA ratio: 0.56    Left side:     Plaque Morphology: Predominantly echogenic, Irregular       Mid CCA: 60 cm/sec     External CA: 44 cm/sec       Proximal ICA: 30 cm/sec     Mid ICA: 47 cm/sec     Distal ICA: 60 cm/sec       Vertebral artery: Antegrade  ICA/CCA ratio: 0.78      Impression    Impression:    1. Right side:        Degree of stenosis of the internal carotid artery: Less than 50 %.      2. Left side:         Degree of stenosis of the internal carotid artery: Less than 50 %.    Consensus Panel Gray-Scale and Doppler US Criteria for Diagnosis of  ICA Stenosis (Radiology 11/2003) additionally modified by Ervin et  al. in Journal of Vascular Surgery 1/2011, [id number])[id number].       Normal         ICA PSV < 140 cm/sec       Plaque Estimate None       ICA/CCA  PSV Ratio < 2.0       ICA EDV < 40 cm/sec       < 50%          ICA PSV < 140 cm/sec       Plaque Estimate < 50%       ICA/CCA  PSV Ratio < 2.0       ICA EDV < 40 cm/sec       50- 69%       ICA -230 cm/sec       Plaque Estimate > or = 50%       ICA/CCA PSV Ratio 2.0-4.0       ICA EDV  cm/sec         > or =  70%, less than near occlusion       ICA PSV > 230 cm/sec       Plaque Estimate > or = 50%       ICA/CCA Ratio > 4.0       ICA EDV > 100 cm/sec                                            Additional criteria from vascular surgery     > 80%       EDV > 120 cm/sec     I have personally reviewed the examination and initial interpretation  and I agree with the findings.    YUMI OLEARY MD   XR Chest Port 1 View    Narrative    XR CHEST PORT 1 VW  5/19/2020 1:52 AM    History:  Check endotracheal tube placement and ECLS cannula  placement. DO NOT log-roll patient.  Place film under patient using  patient safety handling process..     Comparison: Chest radiograph dated 5/18/2020    Findings:   Portable AP supine chest radiograph. Endotracheal tube with the tip  projects 4.4 cm above chaim. Stable gastric tube, inferior ECMO  cannula, temperature probe and intra-aortic pump.    Mild cardiomegaly. No significant change in perihilar and bibasilar  hazy opacities. No pneumothorax or significant pleural effusion.      Impression    IMPRESSION:  1.  Stable supportive lines, tubes and devices.  2.  No substantial change in perihilar opacities, which may represent  pulmonary edema versus atelectasis.  3.  Unchanged bibasilar hazy opacities, atelectasis versus  consolidation.    I have personally reviewed the examination and initial interpretation  and I agree with the findings.    KEIRA KAY MD   XR Chest Port 1 View    Narrative    XR CHEST PORT 1 VW  5/19/2020 11:24 PM    History:  IABP Placement.     Comparison: chest radiograph dated 5/19/2020.    Findings:   Supine portable AP chest radiograph. Interval removal of inferior ECMO  cannula. Endotracheal tube with the tip projects 4.6 cm above chaim.  Stable gastric tube and intra-aortic pump. Advancement of the  temperature probe now with the tip in the distal esophagus.    Stable mild cardiomegaly. Low lung volumes. Interval slightly  increased right perihilar and  right upper lobe hazy opacities. No  significant change in bibasilar hazy opacities. No pneumothorax or  significant pleural effusion.      Impression    IMPRESSION:  1.  Interval removal of the inferior ECMO cannula. Advancement of the  temperature probe with the tip in the distal esophagus. The remaining  lines and medical device are stable.  2.  Interval increased right perihilar and right upper lobe hazy  opacities, which may represent worsening of pulmonary edema versus  infection.    I have personally reviewed the examination and initial interpretation  and I agree with the findings.    CRISTINA MATTSON MD   XR Chest Port 1 View    Narrative    XR CHEST PORT 1 VW  5/21/2020 1:35 AM    History:  Check endotracheal tube placement and ECLS cannula  placement. DO NOT log-roll patient.  Place film under patient using  patient safety handling process..     Comparison: Chest radiograph dated 5/20/2020    Findings:   Supine portable AP chest radiograph. Endotracheal tube with the tip  projects 7.1 cm above chaim. Right upper extremity PICC line with the  tip projects over high right atrium. Stable gastric tube and  temperature probe.    Cardiac silhouette is at upper normal limits. Interval increased right  perihilar and right upper lobe hazy opacities. No pneumothorax or  significant pleural effusion. Osseous structures are unchanged.      Impression    IMPRESSION:  1.  Endotracheal tube with the tip projects 7.1 cm above chaim,  consider advancement for 3 to 4 cm. Otherwise stable supportive lines  and tubes.  2.  Interval increased right perihilar and the upper lobe hazy  opacities, which may represent pulmonary edema versus worsening of  infection.    I have personally reviewed the examination and initial interpretation  and I agree with the findings.    TASHA WARD MD   US Upper Extremity Venous Duplex Left Portable    Narrative    Examination:  Left upper extremity venous US 5/20/2020 5:41 AM      Comparison: None.    History: evaluate for dvt, bruising with warmth, but diffuse anasarca    Findings:     Left side:   The internal jugular, innominate, subclavian, and axillary veins  demonstrate normal phasicity. The internal jugular, axillary,  brachial, cephalic, and basilic veins are fully compressible without  evidence of internal thrombus.      Impression    Impression:   No evidence for DVT in left upper extremity.    I have personally reviewed the examination and initial interpretation  and I agree with the findings.    HILLARY DIXON MD   XR Chest Port 1 View    Narrative    XR CHEST PORT 1 VW  5/20/2020 7:07 PM      HISTORY: picc line placement    COMPARISON: 5/19/2020    FINDINGS: Right upper actually PICC tip projects over the high right  atrium. Esophageal temperature probe tip projects over the low  thoracic esophagus. Endotracheal tube tip projects over the esophagus  at the thoracic inlet. Partially visualized enteric tube. Cardiac  silhouette is stable. Lung volumes are slightly increased. Streaky  perihilar and bibasilar predominant opacities consistent with  atelectasis.      Impression    IMPRESSION: Right upper extremity PICC tip projects over the high  right atrium.    I have personally reviewed the examination and initial interpretation  and I agree with the findings.    KEDAR STOCK MD   XR Chest Port 1 View    Narrative    XR CHEST PORT 1 VW  5/22/2020 1:26 AM    History:  Check endotracheal tube placement and ECLS cannula  placement. DO NOT log-roll patient.  Place film under patient using  patient safety handling process.  39 year old male who was admitted on  5/17/2020 for cardiac arrest.     Comparison: Chest radiograph dated 5/21/2020    Findings:   Semiupright AP chest radiograph. Endotracheal tube with tip projects  3.9 cm above chaim. Stable right upper extremity PICC line,  temperature probe, and gastric tube.    Cardiac silhouette is within normal limits. No substantial  change in  perihilar hazy opacities. No pneumothorax or significant pleural  effusion.      Impression    IMPRESSION:  1.  Stable supportive lines and tubes.  2.  Unchanged perihilar hazy opacities, which may represent pulmonary  edema versus atelectasis/consolidation.    I have personally reviewed the examination and initial interpretation  and I agree with the findings.    REYNALDO ROY MD   XR Chest Port 1 View    Narrative    Portable chest IV 2120 [id number] hours    INDICATION: PICC line placement    COMPARISON: 5/21/2020 0132 hours     FINDINGS: Heart size and shape. Normal. Endotracheal tube is present  with tip approximately 3.5 cm above the chaim. The lungs and  pulmonary vascularity there appear grossly unremarkable with improved  interstitial and perihilar opacities. A right upper extremity PICC  line is present with tip in the proximal right atrium.      Impression    IMPRESSION: Improved interstitial and perihilar opacities. Right PICC  with tip in right atrium.    NATIVIDAD CHAMBERLAIN MD   XR Chest Port 1 View    Narrative    XR CHEST PORT 1 VW  5/23/2020 2:19 AM    History:  Check endotracheal tube placement and ECLS cannula  placement. DO NOT log-roll patient.  Place film under patient using  patient safety handling process..     Comparison: Chest radiograph dated 5/22/2020    Findings:   Semiupright AP chest radiograph. Endotracheal tube with the tip  projects at 3.5 cm above chaim. Stable right upper and the PICC line,  gastric tube and temperature probe.    Cardiac silhouette is within normal limits. Improved bilateral  perihilar hazy opacities. Pulmonary vasculature is mildly indistinct.  No pneumothorax or significant pleural effusion.      Impression    IMPRESSION:  1.  Stable supportive lines and tubes.  2.  Interval decreased perihilar hazy opacities, likely representing  improved pulmonary edema.    I have personally reviewed the examination and initial interpretation  and I agree with the  findings.    KEDAR STOCK MD   XR Chest Port 1 View    Narrative    EXAM: XR CHEST PORT 1 VW  5/22/2020 8:30 AM      HISTORY: Hypoxia.    COMPARISON: Chest x-ray from 5/22/2020 (1:26 AM) and 5/21/2020    FINDINGS: Semiupright portable AP radiograph of the chest. Right upper  extremity PICC with tip projecting over the cavoatrial junction. The  enteric tube courses inferior to the diaphragm and out of the field of  view. Tip of the temperature probe projects over the esophagus. The  tip of the endotracheal tube is approximately 2 cm above the chaim.    Trachea is midline. The cartilage silhouette is unchanged. Stable  perihilar and basilar patchy opacities. No pleural effusion or  pneumothorax. No acute osseous or abdominal abnormality.      Impression    IMPRESSION:  1. Stable perihilar and basilar patchy opacities, likely pulmonary  edema versus infection.  2. The tip of the endotracheal tube is approximately 2 cm above the  chaim. Stable positioning of the remaining support devices.    I have personally reviewed the examination and initial interpretation  and I agree with the findings.    LEANA MOREIRA MD   XR Chest Port 1 View    Narrative    XR CHEST PORT 1 VW  5/24/2020 2:50 AM    History:  Check endotracheal tube placement and ECLS cannula  placement. DO NOT log-roll patient.  Place film under patient using  patient safety handling process..     Comparison: Chest radiograph dated 5/23/2020    Findings:   Semiupright AP chest radiograph. Endotracheal tube with the tip  projects 4.1 cm above chaim. Stable right upper extremity PICC line,  gastric tube and temperature probe.    Cardiac silhouette is within normal limits. Slightly improved  perihilar hazy opacities. No pneumothorax or significant pleural  effusion.      Impression    IMPRESSION:  1.  Stable supportive lines and tubes.  2.  Interval slightly improved perihilar hazy opacities, likely  representing pulmonary edema.    I have personally reviewed  the examination and initial interpretation  and I agree with the findings.    MICHELLE HAMILTON MD   XR Chest Port 1 View    Narrative    XR CHEST PORT 1 VW  5/24/2020 12:13 PM      HISTORY: ett placement    COMPARISON: Earlier same day    FINDINGS: Endotracheal tube tip projects over the mid to upper  thoracic trachea. Esophageal temperature probe tip pulled back to the  thoracic inlet. Other support devices are unchanged. Stable cardiac  silhouette and pulmonary vasculature. Hazy perihilar and bibasilar  opacities are unchanged. More focal opacity left lower lobe.      Impression    IMPRESSION:  1. Endotracheal tube tip projects over the mid to upper thoracic  trachea.  2. Unchanged perihilar opacities. Favor to be edema.  3. Left lower lobe atelectasis.    I have personally reviewed the examination and initial interpretation  and I agree with the findings.    MICHELLE HAMILTON MD   XR Chest Port 1 View    Narrative    EXAMINATION: XR CHEST PORT 1 VW, 5/25/2020 11:14 AM    INDICATION: aspiration pneumonia    COMPARISON: Chest x-ray 5/24/2020    FINDINGS: Single portable upright AP radiograph of the chest. ET tube,  esophageal temperature probe, right PICC line and enteric tube are  stable in positioning. Trachea is midline. Cardiomediastinal  silhouette is stable. Similar appearing hilar opacities. Interstitial  and airspace opacities. No pleural effusion. No pneumothorax.       Impression    IMPRESSION:  1. Interstitial and airspace opacities throughout with prominent  opacities in the michelle bilaterally. May represent pulmonary edema,  aspiration, infection.  2. Stable support devices.    I have personally reviewed the examination and initial interpretation  and I agree with the findings.    KEDAR STOCK MD   CT Head w/o Contrast    Narrative    CT HEAD W/O CONTRAST 5/25/2020 4:42 PM    Provided History: s/p cardiac arrest    Comparison: Head CT 5/17/2020    Technique: Using multidetector thin collimation helical  acquisition  technique, axial, coronal and sagittal CT images from the skull base  to the vertex were obtained without intravenous contrast.     Findings: Multiple regions of abnormal hypoattenuation with loss of  gray-white differentiation involving both cerebral hemispheres, most  conspicuous in the left cerebral hemisphere (for instance series 3;  images 26 and 30 ). Also questionable diffuse loss of gray-white  matter differentiation throughout both cerebral hemispheres., Although  images are mildly degraded by artifact related to multiple  extracranial leads.    No intracranial hemorrhage, mass effect, or midline shift. The  ventricles are proportionate to the cerebral sulci. The basal cisterns  are patent.    Frontal sinus mucosal hypertrophy. Fluid and mucosal hypertrophy  within the ethmoid and sphenoid sinuses. Mucosal hypertrophy of the  maxillary sinuses, right greater than left. Mild bilateral mastoid air  cell effusions.    Partial visualization of endotracheal tube and enteric tube.      Impression    Impression:   1. Multifocal acute/subacute cerebral infarcts in addition to probable  diffuse loss of gray-white matter differentiation which can be seen in  diffuse hypoxic injury.  2. New mastoid effusions and paranasal sinus fluid and mucosal  hypertrophy, nonspecific in the setting of intubation.    I have personally reviewed the examination and initial interpretation  and I agree with the findings.    TASHA JACKSON MD   XR Abdomen Port 1 View    Narrative    EXAMINATION: XR ABDOMEN PORT 1 VW, 5/26/2020 12:29 PM    COMPARISON: CT 5/17/2020    HISTORY: feeding tube placement    FINDINGS: Feeding tube at the ligament of Treitz. NG/OG tube overlies  the stomach. No air-filled dilated small bowel loops.      Impression    IMPRESSION: Feeding tube at the ligament of Treitz with NG/OG tube  overlying the stomach.     DARWIN BRUNO MD   MR Brain w/o Contrast    Narrative    MR BRAIN W/O CONTRAST  5/26/2020 5:09 PM    Provided History: CTH w/ multifocal acute/subacute infarcts. Cardiac  arrest    Comparison:  Head CT dated 5/25/2020     Technique: Sagittal T1-weighted and axial T2-weighted, turboFLAIR and  diffusion-weighted with ADC map images of the brain were obtained  without intravenous contrast.    Findings: Multifocal diffusion restriction involving bilateral  cerebral hemispheres, corpus callosum and left temporoparietal White  matter. Diffuse microhemorrhage involving entire cerebral and the  cerebellum. No significant mass effect or midline shift. No abnormal  extra-axial fluid collection. The ventricles and sulci are normal for  age. Normal intravascular flow voids.    The visualized orbits are unremarkable. Mucosal thickening noted in  the bilateral sphenoid, right maxillary and right frontal sinuses.  Diffuse opacification of bilateral mastoid air cells.      Impression    Impression:  1.  Diffuse acute/subacute infarcts in bilateral cerebral hemispheres,  corpus callosum and periventricular white matter, consistent with  evolving diffuse hypoxic ischemic brain injury.  2.  Diffuse microhemorrhage involving infra and supratentorial brain,  likely ECMO related.    I have personally reviewed the examination and initial interpretation  and I agree with the findings.    JENNIFER CORBETT MD   XR Chest Port 1 View    Narrative    EXAM: XR CHEST PORT 1 VW  5/27/2020 10:00 AM      HISTORY: aspiration pneumonia    COMPARISON: Chest x-ray from 5/25/2020 and 5/24/2020.    FINDINGS: Semiupright portable AP radiograph of the chest. Right upper  extremity PICC tip projects over the cavoatrial junction. The feeding  tube and NG/OG tube course inferior to the diaphragm and out of the  field of view. The tip of the NG/OG tube appears to be coiled in the  fundus. The tip of the endotracheal tube is approximately 4 cm above  the chaim.    Low lung volumes. Trachea is midline, heart size is normal. No  pleural  effusion or pneumothorax. Unchanged perihilar and left basilar  opacities. No acute osseous or abdominal abnormality.      Impression    IMPRESSION:  1. Unchanged perihilar and left basilar opacities, likely pulmonary  edema versus aspiration versus atelectasis.  2. Appropriate positioning of the support devices.    I have personally reviewed the examination and initial interpretation  and I agree with the findings.    LEANA MOREIRA MD   XR Chest Port 1 View    Narrative    EXAM: XR CHEST PORT 1 VW    HISTORY: Endotracheal tube placement    COMPARISON: 5/27/2020    FINDINGS: Portable AP view of the chest. Endotracheal tube tip  projects over the mid thoracic trachea. Gastric tube and enteric tube  in stable position. Right PICC tip projects over the superior  cavoatrial junction.    Midline trachea. Cardiomediastinal silhouette is stable. Pulmonary  vasculature is distinct. Right upper lung subsegmental atelectasis. No  pleural effusion or pneumothorax. Upper abdomen is unremarkable.      Impression    IMPRESSION:  1. Endotracheal tube tip projects over the mid thoracic trachea.  2. Right upper lobe subsegmental atelectasis.  3. Pulmonary vascular congestion.    I have personally reviewed the examination and initial interpretation  and I agree with the findings.    CRISTINA MATTSON MD   XR Chest Port 1 View    Narrative    EXAM: XR CHEST PORT 1 VW  6/1/2020 6:58 PM     HISTORY:  fever       COMPARISON:  5/20/2020    FINDINGS: Single view of the chest. Single view of the chest. Enteric  tube sidehole projects over the left upper quadrant in expected  location of the stomach with the tip pointing superiorly towards the  GE junction. Stable positioning of right upper extremity PICC and  partially visualized feeding tube. Endotracheal tube tip projects over  the midthoracic trachea, similar to prior. No focal airspace opacity.  No pleural effusion. No pneumothorax. Cardiomediastinal silhouette  is  unchanged. Decreased haziness of the pulmonary vasculature.      Impression    IMPRESSION:   1. Stable support devices.  2. No focal airspace opacity.    I have personally reviewed the examination and initial interpretation  and I agree with the findings.    KEIRA KAY MD   XR Chest Port 1 View    Narrative     Examination:  XR CHEST PORT 1 VW     Date:  6/3/2020 9:47 AM      Clinical Information: fever     Additional Information: none    Comparison: 6/1/2020 chest x-ray.    Findings:     The single film of the chest demonstrates a small amount of airspace  changes in the left base retrocardiac region. There is a slightly  increased over the 6/1/2020 examination could represent a developing  pneumonia. No pleural effusions are present.    The ET tube is in good position. The right PICC line has its tip  projected in the lower SVC.      Impression    Impression:    1. Concern for developing left lower lobe pneumonia..    HILLARY DIXON MD   CT Chest Abdomen Pelvis w/o Contrast    Narrative    EXAMINATION: CT CHEST ABDOMEN PELVIS W/O CONTRAST, 6/3/2020 2:40 PM    TECHNIQUE:  Helical CT images from the thoracic inlet through the  symphysis pubis were obtained without IV contrast.     COMPARISON: CT abdomen pelvis 5/17/2020    HISTORY: persistent fevers    FINDINGS:    Lines and tubes: Right sided central catheter tip in the superior  cavoatrial junction. Endotracheal tube 3 cm above the chaim. Enteric  tube in the distal stomach. Feeding tube in the jejunum. Rectal tube  in mid rectum.    CHEST:  LUNGS: Patchy area of groundglass in the right upper lobe (series 4,  image 40). Posterior bibasilar curvilinear opacities likely  representing fibroatelectasis; resolution of dense consolidative  opacities seen on prior CT dated 5/17/2020. No mass or consolidation.  No pleural effusion or pneumothorax. The airway is patent.    MEDIASTINUM: Heart size is within normal limits. No pericardial  effusion. No thoracic  lymphadenopathy. Thyroid is unremarkable.    ABDOMEN/PELVIS:    Limited evaluation due to noncontrast technique.      Hypodense collection in the right inguinal region measuring 5 x 2.6  cm, abutting the right common femoral vascular bundle; there is  additional collection with fluid fluid level and hyperdense dependent  contents anterior to this collection measuring 4.8 x 3.9 cm. Prominent  inguinal lymph nodes right more than left.      LIVER: No focal liver lesion. No intrahepatic biliary ductal  dilatation.    BILIARY: No calcified gallstone.    PANCREAS: No pancreatic ductal dilatation. Unchanged 3 mm hypodensity  in the pancreatic tail    SPLEEN: Within normal limits.    ADRENAL GLANDS: Within normal limits.    URINARY TRACT: Within normal limits.    REPRODUCTIVE ORGANS: Within normal limits.    STOMACH: Within normal limits.    BOWEL: Normal caliber of the small and large bowel.  Appendix is  within normal limits.    PERITONEUM/FLUID: No free fluid.    VESSELS: No aneurysmal dilatation of the abdominal aorta. Scattered  atherosclerotic calcification of the abdominal aorta.    LYMPH NODES: No lymphadenopathy.    BONES/SOFT TISSUES: No aggressive osseous lesions. Pars defects  bilaterally at L5 demonstrated with no significant anterolisthesis.  Multiple subcutaneous anterior abdominal wall nodules, possibly  injection granuloma.      Impression    IMPRESSION: In this patient with history of persistent fever, the  current scan shows:   1. Hypodense collection measuring up to 5 cm in the right inguinal  region abutting the femoral vascular bundle with additional collection  anterior to this collection measuring up to 4.8 cm with fluid/fluid  level and dependent hyperdense contents; collections may represent  hematoma/abscess and/or pseudoaneurysm. Consider right inguinal  Doppler ultrasound for further evaluation.  2. No intra-abdominal or intrathoracic abscess or collection.  3. Patchy groundglass opacity in the  right upper lobe may represent  atelectasis versus inflammatory change.  4. Support devices as described above.    I have personally reviewed the examination and initial interpretation  and I agree with the findings.    KEIRA KAY MD   US Lower Extremity Arterial rt    Narrative    Exam: US LOWER EXTREMITY ARTERIAL RT, 6/3/2020 4:30 PM    Indication: fluid collection on CT CAP; ?hematoma/abscess vs  pseudoaneurysm    Comparison: CT 6/3/2020    Findings:   Redemonstration of multiple fluid collections overlying the right  common femoral artery and common femoral vein. The lateral pleural  collection measures 6.2 x 5.5 x 3.1 cm and does not demonstrate  Doppler flow. The medial collection measures 3.3 x 5.5 x 3.3 cm and  does not demonstrate Doppler flow.    Normal arterial waveform in the right common femoral artery with  velocity of 61 cm/s.      Impression    Impression:   1. No evidence of pseudoaneurysm or arteriovenous fistula.  2. Slightly increased size of right groin fluid collections which may  represent hematomas.    I have personally reviewed the examination and initial interpretation  and I agree with the findings.    YUMI OLEARY MD   CT Head w/o Contrast    Narrative    CT HEAD W/O CONTRAST 6/3/2020 5:38 PM    Provided History: not moving LLE  ICD-10:    Comparison: MR dated 5/26/2020. CT dated 5/25/2020.    Technique: Using multidetector thin collimation helical acquisition  technique, axial, coronal and sagittal CT images from the skull base  to the vertex were obtained without intravenous contrast.     Findings:    No intracranial hemorrhage, mass effect or midline shift. The  ventricles are proportionate to the cerebral sulci. Diffuse low  contrast differentiation between the cerebral gray matter and white  matter are not significantly changed from 5/25/2020. The basal  cisterns are patent.    Frontal and sphenoid sinus mucosal thickening. Scant fluid in the  dependent mastoid air cells.        Impression    Impression:   1. No change in imaging features of diffuse hypoxic ischemic injury.  2. No evident new intracranial pathology.    I have personally reviewed the examination and initial interpretation  and I agree with the findings.    TASHA JACKSON MD   XR Chest Port 1 View    Narrative    EXAMINATION:  XR CHEST PORT 1 VW 6/8/2020 2:16 PM.    COMPARISON: Chest x-ray and CT chest abdomen pelvis 6/3/2020    HISTORY:  S/p perc tracheosomy placement    FINDINGS: AP radiograph the chest at 30 degrees. Interval placement of  tracheostomy tube, with tip projecting over the high thoracic trachea.  Right arm PICC tip projects over the low SVC. Partially visualized  enteric tube.    Trachea is midline. Cardiac silhouette and pulmonary vasculature are  within normal limits. No pleural effusion. No pneumothorax. Unchanged  mild streaky bibasilar opacities, likely representing atelectasis.      Impression    IMPRESSION: Interval placement of tracheostomy tube, with tip  projecting over the high thoracic trachea. Probable areas of  atelectasis in the lung bases, recommend follow-up to clearing to  exclude infection.    I have personally reviewed the examination and initial interpretation  and I agree with the findings.    NATIVIDAD CHAMBERLAIN MD   IR Gastrostomy Tube Percutaneous Plcmnt    Narrative    PROCEDURES:  1. Gastrostomy tube placement under fluoroscopic guidance    CLINICAL HISTORY: STEMI. Prolonged hospitalization. Need for  nutritional access.    COMPARISONS: CT chest 6/3/2020    STAFF RADIOLOGIST: SAUMYA Laguna MD    FELLOW(S)/RESIDENT(S): SUSAN Adam MD    I, FAINA LAGUNA MD, attest that I was present in the procedure room for  the entire procedure.     Medications: The patient was placed on continuous monitoring.  Intravenous sedation was administered.  Vital signs and sedation  monitored by nursing staff under Interventional Radiologist's  supervision. The patient remained stable throughout the  procedure.    ATTENDING FACE-TO-FACE SEDATION TIME: 20 minutes    FLUOROSCOPY TIME: 2.7 minutes.    DOSE: 40 mGy    PROCEDURE: Informed consent was obtained prior to the procedure.    Pre-procedural ultrasound performed to delineate the liver margins.    The left upper quadrant was prepped and draped in the usual sterile  fashion. 1% lidocaine without epinephrine was used for local  anesthesia.     Glucagon administered (1 mg). Existing feeding tube was withdrawn into  the stomach under fluoroscopic guidance. Stomach inflated with air  through the existing feeding tube. Under fluoroscopic guidance,  gastropexy was made with 2 [employer] Saf-T-Pexy T-fasteners.    Needle gastrostomy made under fluoroscopic guidance. Needle removed  over guidewire. 22 Turkish peel-away sheath advanced into the stomach  over the guidewire. 18 Turkish [employer] YOUNG gastrostomy tube with  ENFit connector advanced over the guidewire through the peel-away  sheath into the stomach under fluoroscopic guidance. Peel-away sheath  and guidewire removed. Gastrostomy tube balloon inflated and tube  secured. Adequate placement in the stomach documented with contrast.     Fluoroscopic image documenting gastrostomy tube placement and patency  with contrast was saved in the patient's record.    Tube flushed with saline. Sterile dressing applied. Gastrostomy tube  placed to gravity drainage. Nasogastric tube removed. No immediate  complication.      Impression    IMPRESSION:   1. 18 Turkish [employer] YOUNG gastrostomy tube with ENFit connector  placed under fluoroscopic guidance. Catheter to gravity drainage.    I have personally reviewed the examination and initial interpretation  and I agree with the findings.    FAINA LAGUNA MD   XR Chest Port 1 View    Narrative    Exam: Chest radiograph, 6/12/2020 8:24 AM    Indication: Elevated WBC    Comparison: 6/8/2020    Findings:   Upright AP radiograph. Right arm PICC tip projects at the  cavoatrial  junction. Tracheostomy tube is in a stable position. Enteric tube has  been removed. Cardiomediastinal silhouette is within normal limits.  Midline trachea. No pneumothorax or effusion. No focal airspace  opacity. Improved mild bibasilar streaky opacities. No new airspace  opacity.       Impression    Impression:   1. Enteric tube has been removed. Additional support devices are  stable.  2. Improved mild bibasilar streaky opacities, likely atelectasis. No  new airspace opacity.    I have personally reviewed the examination and initial interpretation  and I agree with the findings.    LEANA MOREIRA MD   XR Abdomen Port 1 View    Narrative    Exam: XR ABDOMEN PORT 1 VW, 6/12/2020 8:16 AM    Indication: Vomiting    Comparison: 6/3/2020, 5/26/2020.    Findings:   A single supine AP view of the abdomen was obtained. The percutaneous  gastrostomy tube balloon projects over the stomach. Nonobstructive  bowel gas pattern. No pneumatosis or portal venous gas appreciated.  The visualized lung bases are clear. Degenerative changes in the  lumbar spine.      Impression    Impression:   Nonobstructive bowel gas pattern. The percutaneous gastrostomy tube  projects over the stomach.    TASHA ROBERT MD   US Abdomen Limited    Narrative    EXAMINATION: US ABDOMEN LIMITED  6/12/2020 1:04 PM      CLINICAL HISTORY: Bilious emesis.    COMPARISON: Correlation made with CT chest abdomen pelvis dated  6/3/2020        FINDINGS:  The liver is diffusely echogenic and measures 17.7 cm. There is no  intrahepatic or extrahepatic biliary ductal dilatation. The common  bile duct measures 2.4 mm.     The gallbladder is normal, without gallstones, wall thickening, or  pericholecystic fluid. Sonographic Zambrano's sign is negative.    The visualized upper abdominal aorta and inferior vena cava are normal  in appearance and caliber.    The right kidney measures 11.1 cm and is normal in position and  echogenicity. There is no significant  urinary tract dilatation.         Impression    IMPRESSION:   The liver is mildly enlarged and echogenic, consistent with diffuse  fatty infiltration.    I have personally reviewed the examination and initial interpretation  and I agree with the findings.    TASHA ROBERT MD   XR Chest Port 1 View    Narrative    Exam: XR CHEST PORT 1 VW, 6/13/2020 7:29 AM    Indication: Assess for VAP    Comparison: 6/12/2020    Findings:   Portable upright AP radiograph of the chest. Tracheostomy tube tip  projects over the upper trachea. Right upper extremity PICC tip  projects over the low SVC. Stable cardiac silhouette. Coronary stent.  No pneumothorax or pleural effusion although the right costophrenic  angle is incompletely imaged. Mild streaky right perihilar opacities.  No acute bony abnormalities. The upper abdomen is unremarkable.        Impression    Impression:   1. Stable position of supportive devices.  2. Mild streaky right perihilar opacities, favored to represent  subsegmental atelectasis.    I have personally reviewed the examination and initial interpretation  and I agree with the findings.    TASHA ROBERT MD   XR Chest Port 1 View    Narrative    EXAM: XR CHEST PORT 1 VW  6/14/2020 11:43 AM      HISTORY: fever, tachycardia, GNR sputum    COMPARISON: Previous day.     TECHNIQUE: Frontal view of the chest.    FINDINGS:   Right arm PICC line tip in stable position at the cavoatrial junction.  Left coronary stent. Stable position of the tracheostomy cannula lower  thoracic trachea.     Left cervical subcutaneous emphysema appears grossly stable. No acute  bony abnormalities. Cardiomediastinal borders are clear. No  enlargement of the cardiac silhouette. No appreciable pneumothorax or  pleural effusions. Stable perihilar opacities.       Impression    IMPRESSION:  1. Mild perihilar opacities. Stable support devices.  2. Stable left cervical subcutaneous emphysema.    I have personally reviewed the examination  and initial interpretation  and I agree with the findings.    MICHELLE HAMILTON MD   XR Chest Port 1 View    Narrative    EXAM: XR CHEST PORT 1 VW 6/15/2020 9:42 AM    HISTORY: Subcutaneous emphysema    COMPARISON: 2020    FINDINGS: Portable AP view of the chest. Right PICC tip projects over  the superior cavoatrial junction. Tracheostomy tube tip projects over  the mid thoracic trachea.    Midline trachea. Cardiomediastinal silhouette is stable. Pulmonary  vasculature is distinct. Low lung volumes. Mild right perihilar  opacities decreased from comparison exam. The left costophrenic angle  is collimated beyond the field-of-view. No large left pleural  effusion. No right pleural effusion. No pneumothorax. The upper  abdomen is unremarkable. Stable bilateral cervical subcutaneous  emphysema. Elevation of the right hemidiaphragm.      Impression    IMPRESSION:  1. Stable support devices.  2. Stable bilateral cervical subcutaneous emphysema.  3. Mild right perihilar opacities, favored to represent atelectasis in  the setting of low lung volume.    I have personally reviewed the examination and initial interpretation  and I agree with the findings.    NATIVIDAD CHAMBERLAIN MD   Echocardiogram Complete    Narrative    [id number]  [id number]  [id number]  [id number]^GRIS^YOSHI           [facility],[place]  Echocardiography Laboratory  [address] [place] [id number]     Name: FAINA FORRESTER  MRN: [id number]  : 1980  Study Date: 2020 08:16 AM  Age: 39 yrs  Gender: Male  Patient Location: [facility]  Reason For Study: Cardiac Arrest  Ordering Physician: YOSHI LANDRY  Performed By: Denisse Johnson RDCS     BSA: 2.1 m2  Height: 66 in  Weight: 231 lb  BP: 132/58 mmHg  _____________________________________________________________________________  __        Procedure  Complete Portable Echo Adult. Technically difficult study.Extremely poor  acoustic  windows.  _____________________________________________________________________________  __        Interpretation Summary  VA ECMO at 3.7L/min. Technically difficult study. Extremely poor acoustic  windows.  Severely (EF <30%) reduced left ventricular function on extremely limited  views.  The right ventricle cannot be assessed.  No pericardial effusion is present.  Previous study not available for comparison.  _____________________________________________________________________________  __        Left Ventricle  Left ventricular wall thickness cannot evaluate. Left ventricular size is  normal. Severely (EF <30%) reduced left ventricular function is present. Left  ventricular diastolic function is not assessable. Severe diffuse hypokinesis  is present.     Right Ventricle  The right ventricle cannot be assessed. Right ventricular function cannot be  assessed due to poor image quality.     Mitral Valve  The mitral valve cannot be assessed.        Aortic Valve  The aortic valve opens with each cardiac cycle. On Doppler interrogation,  there is no significant stenosis or regurgitation.     Tricuspid Valve  The tricuspid valve cannot be assessed. Pulmonary artery systolic pressure  cannot be assessed.     Pulmonic Valve  The pulmonic valve cannot be assessed.     Vessels  The aorta root cannot be assessed. The thoracic aorta cannot be assessed.  Venous ECMO cannula is visualized traversing the IVC.     Pericardium  No pericardial effusion is present.     Compared to Previous Study  Previous study not available for comparison.     _____________________________________________________________________________  __                       Report approved by: Ashlee Izquierdo 05/18/2020 09:18 AM                 _____________________________________________________________________________  __      Echocardiogram Limited    Narrative    [id number]  [id number]  [id number]  [id number]^EARLE^CAITLYN^[facility]  [facility],[place]  Echocardiography Laboratory  [address] [place] [address]     Name: FAINA FORRESTER  MRN: [id number]  : 1980  Study Date: 2020 10:37 AM  Age: 39 yrs  Gender: Male  Patient Location: [facility]  Reason For Study: Cardiac Arrest  Ordering Physician: CAITLYN OG  Performed By: ABDIRAHMAN Benitez     BSA: 2.1 m2  Height: 66 in  Weight: 232 lb  HR: 123  BP: 130/55 mmHg  _____________________________________________________________________________  __        Procedure  Limited Portable Echo Adult.  _____________________________________________________________________________  __        Interpretation Summary  Limited turndown study. Patient on VA ECMO and IABP support.     Baseline (4 LPM): Small LV size with mildly reduced global LV function, LVEF=-  45-50%.  Grossly normal RV function.  The ventricular septum is midline.  The aortic valve opens with every beat.  No significant tricuspid and mitral regurgitation.  No pericardial effusion.     There is significant improvement in biventricular function with sequential  decrease in flow rate to 1LPM and with IABP off. Overall, significant  improvement in LV function compared to 20.  _____________________________________________________________________________  __     _____________________________________________________________________________  __     MMode/2D Measurements & Calculations  IVSd: 1.3 cm  LVIDd: 3.9 cm  LVIDs: 2.8 cm  LVPWd: 1.3 cm  FS: 29.1 %  LV mass(C)d: 182.2 grams  LV mass(C)dI: 85.5 grams/m2     EF(MOD-bp): 59.3 %  RWT: 0.68           _____________________________________________________________________________  __           Report approved by: Ashlee Lacy 2020 11:28 AM      Echo Complete    Narrative    [id number]  [id number]  [id number]  [id number]^EARLE^CAITLYN^ZULEMA           [facility],[place]  Echocardiography Laboratory  [address] [place]  [address]     Name: FAINA FORRESTER  MRN: [id number]  : 1980  Study Date: 2020 10:05 AM  Age: 39 yrs  Gender: Male  Patient Location: [facility]  Reason For Study: Cardiac Arrest  Ordering Physician: CAITLYN OG  Referring Physician: CAITLYN OG  Performed By: Wally Sherman     BSA: 2.3 m2  Height: 66 in  Weight: 273 lb  HR: 128  BP: 109/53 mmHg  _____________________________________________________________________________  __        Procedure  Echocardiogram with two-dimensional, color and spectral Doppler performed.  _____________________________________________________________________________  __        Interpretation Summary     Sinus tachycardia at 130BPM.  The Ejection Fraction is estimated at 45-50%.  Akinetic LV apical segments.  Right ventricular function, chamber size, wall motion, and thickness are  normal.  Dilation of the inferior vena cava is present with abnormal respiratory  variation in diameter.  No pericardial effusion is present.  _____________________________________________________________________________  __        Left Ventricle  Left ventricular size is normal. Sinus tachycardia at 130BPM. Left ventricular  wall thickness is normal. The Ejection Fraction is estimated at 45-50%.  Diastolic function not assessed due to tachycardia. Akinetic LV apical  segments.     Right Ventricle  Right ventricular function, chamber size, wall motion, and thickness are  normal.     Atria  Both atria appear normal.     Mitral Valve  The mitral valve is normal.        Aortic Valve  Aortic valve is normal in structure and function.     Tricuspid Valve  The tricuspid valve cannot be assessed. Trace to mild tricuspid insufficiency  is present. Pulmonary artery systolic pressure cannot be assessed.     Pulmonic Valve  The pulmonic valve is normal.     Vessels  The thoracic aorta is normal. The pulmonary artery and bifurcation cannot be  assessed. Dilation of the inferior vena cava is present with  abnormal  respiratory variation in diameter.     Pericardium  No pericardial effusion is present.     _____________________________________________________________________________  __  MMode/2D Measurements & Calculations  IVSd: 0.96 cm  LVIDd: 5.1 cm  LVIDs: 3.0 cm  LVPWd: 0.85 cm  FS: 39.8 %  LV mass(C)d: 162.2 grams  LV mass(C)dI: 71.0 grams/m2     Ao root diam: 3.0 cm  asc Aorta Diam: 2.5 cm  LVOT diam: 2.0 cm  LVOT area: 3.1 cm2  LA Volume (BP): 72.7 ml  LA Volume Index (BP): 31.9 ml/m2  RWT: 0.34        Doppler Measurements & Calculations  MV E max sachin: 96.1 cm/sec  MV A max sachin: 62.4 cm/sec  MV E/A: 1.5  Ao V2 max: 152.0 cm/sec  Ao max P.0 mmHg  ALESSANDRO(V,D): 2.4 cm2     LV V1 max P.5 mmHg  LV V1 max: 117.0 cm/sec  LV V1 VTI: 17.5 cm  SV(LVOT): 55.0 ml  SI(LVOT): 24.1 ml/m2  PA V2 max: 168.0 cm/sec  PA max P.3 mmHg  PA acc time: 0.10 sec  AV Sachin Ratio (DI): 0.77  Lateral E/e': 11.5     _____________________________________________________________________________  __           Report approved by: Ashlee Henson 2020 11:08 AM      Echo Limited    Narrative    [id number]  [id number]  [id number]  [id number]^EARLE^CAITLYN^ZULEMA           [facility],[place]  Echocardiography Laboratory  [address] [place] [address]     Name: FAINA FORRESTER  MRN: [id number]  : 1980  Study Date: 2020 01:22 PM  Age: 39 yrs  Gender: Male  Patient Location: [facility]  Reason For Study: Cardiac Arrest  Ordering Physician: CAITLYN OG  Performed By: Denisse Johnson RDCS     BSA: 2.0 m2  Height: 66 in  Weight: 208 lb  HR: 125  BP: 128/85 mmHg  _____________________________________________________________________________  __        Procedure  Limited Portable Echo Adult. Contrast Optison. Technically difficult study.  Optison (NDC #6316-3310-74) given intravenously. Patient was given 5 ml  mixture of 3 ml Optison and 6 ml saline. 4 ml  wasted.  _____________________________________________________________________________  __        Interpretation Summary  Limited study to evaluate biventricular function.     Left ventricular size is normal. The Ejection Fraction is estimated at 35-40%.  The mid to distal anterolateral wall, the apex and distal septum is akinetic  consistent with LAD territory injury.  Right ventricular function, chamber size, wall motion, and thickness are  normal.  No pericardial effusion is present.     Compared to prior study EF appears grossly similar.  _____________________________________________________________________________  __        Left Ventricle  Left ventricular size is normal. Left ventricular wall thickness is normal.  The Ejection Fraction is estimated at 35-40%. The mid to distal anterolateral  wall, the apex and distal septum is akinetic consistent with LAD territory  injury.     Right Ventricle  Right ventricular function, chamber size, wall motion, and thickness are  normal.     Atria  The atria cannot be assessed.     Mitral Valve  The mitral valve is normal.        Aortic Valve  The aortic valve cannot be assessed.     Tricuspid Valve  The tricuspid valve cannot be assessed.     Pulmonic Valve  The pulmonic valve cannot be assessed.     Vessels  The inferior vena cava cannot be assessed.     Pericardium  No pericardial effusion is present.        Compared to Previous Study  Compared to prior study EF appears grossly similar.  _____________________________________________________________________________  __                          Report approved by: Ashlee MILLS 05/26/2020 02:19 PM              _____________________________________________________________________________  __      Echo Limited    Narrative    [id number]  [id number]  [id number]  [id number]^EARLE^CAITLYN^ZULEMA           [facility],[place]  Echocardiography Laboratory  [address] [place] [id number]     Name:  FAINA FORRESTER  MRN: [id number]  : 1980  Study Date: 06/10/2020 02:08 PM  Age: 39 yrs  Gender: Male  Patient Location: [facility]  Reason For Study: CAD  Ordering Physician: CAITLYN OG  Performed By: Denisse Johnson RDCS     BSA: 2.0 m2  Height: 66 in  Weight: 207 lb  HR: 103  BP: 107/76 mmHg  _____________________________________________________________________________  __        Procedure  Limited Portable Echo Adult. Contrast Optison. Optison (NDC #7634-4013-59)  given intravenously. Patient was given 5 ml mixture of 3 ml Optison and 6 ml  saline. 4 ml wasted.  _____________________________________________________________________________  __        Interpretation Summary  LVEF 36% based on biplane 2D tracing.  LAD territory akinesis.  Right ventricular function, chamber size, wall motion, and thickness are  normal.  The inferior vena cava is normal.  No pericardial effusion is present.  There has been no change.  _____________________________________________________________________________  __        Left Ventricle  LVEF 36% based on biplane 2D tracing. Left ventricular wall thickness is  normal. LAD territory akinesis.     Right Ventricle  Right ventricular function, chamber size, wall motion, and thickness are  normal.     Vessels  The inferior vena cava is normal.     Pericardium  No pericardial effusion is present.        Compared to Previous Study  There has been no change.  _____________________________________________________________________________  __     MMode/2D Measurements & Calculations     EF(MOD-bp): 36.1 %  TAPSE: 2.6 cm        Doppler Measurements & Calculations  LV V1 max PG: 3.3 mmHg  LV V1 max: 90.8 cm/sec  LV V1 VTI: 14.6 cm     _____________________________________________________________________________  __           Report approved by: Ashlee Henson 06/10/2020 03:01 PM      Cardiac Catheterization    Narrative      Acute anterolateral myocardial infarction, with out of  hospital cardiac   arrest, ROSC, and recurrent arrest on arrival to cath lab    Single vessel CAD with thrombotic occlusion of proximal LAD    Successful aspiration thrombectomy of the proximal LAD    Successful stenting of proximal-mid LAD with NAZIA (3.0x20 Synergy),   post-dilation to 4.0 mm guided by IVUS.    Successful POBA (with final kissing balloon) of ostial D1    Cardiac resuscitation with inotropic therapy    CPR    Successful placement of periperhal V-A ECMO with Cardiohelp (RFA, RFV)    Successful placement of Pressgluel Quattro cooling catheter for Thermoguard   hypothermia    Successful placement of RT radial arterial line    IVUS was performed on the proximal/mid LAD post stenting    Successful insertion of distal antegrade Flex sheath into RT SFA

## 2020-06-15 NOTE — PROGRESS NOTES
Cardiology Progress Note  Scot Bishop MRN: [id number]  Age: 39 year old, : 1980  Date: 2020            Assessment and Plan:     Scot Bishop is a 39 year old male who was admitted on 2020 for cardiac arrest. Pt reportedly had chest pain that started on 20. He was using Drano on his pipes at home and did not initially seek medical attention for CP and SOB since it said on the box that Drano can cause those symptoms. He took a shower and had syncopal episode after coming out of the shower. EMS was called; initial rhythm asystole. With chest compressions pt eventually had PEA and then eventually had VF in the field. After multiple cycles of epinephrine had ROSC. He was intubated in the field.   Pt was brought to [facility] ED where he regained a pulse and was brought to the Cath Lab for coronary angiogram. Initially, BP was 90s/60s as he was being transported from ED but he had recurrent cardiac arrest on arrival to Cath Lab. ECG showed ST elevation in aVR. He was promptly placed on VA ECMO. He had thrombotic occlusion of proximal LAD and underwent successful aspiration thrombectomy and PCI w/ NAZIA of proximal and mid LAD.     Interval events: additional dose of seroquel given overnight for agitation. Cervical subcutaneous emphysema noted on CXR, unchanged; stable on trach dome 30%. Initial plan was for transfer to [facility] today however patient had emesis this afternoon. Abd xray and ultrasound for emesis last week negative. ID following for ongoing low grade temp, leukocytosis.    Today's plan:   -CT CAP given ongoing emesis   -empiric zosyn x 7 days per ID    -monitor crepitus per SICU   -defer transfer to [facility]    Neurology: Hypoxic brain injury   Initial CT head negative. Cooled to 34 degrees on arrival; rewarmed  AM. EEG  showed severe diffuse encephalopathy without seizures or epileptiform discharges.   Repeat CTH : multifocal acute/subacute cerebral  infarcts   Brain MRI 5/26: diffuse acute/subacute infarcts in bilat cerebral hemispheres, corpus callosum, and periventricular white matter c/w evolving diffuse hypoxic ischemic brain injury   Episode of LUE posturing and rhythmic tremors overnight on 5/27. EEG restarted 5/28, now off.  CTH repeated 6/3 for lack of LLE movement - negative for new stroke or other acute changes.   -s/p trach placement 6/8  -seroquel 25 mg qAM and 50 mg qPM    -oxycodone d/c'd     -precedex if needed for agitation   -moving all extremities although nothing to command    Cardiovascular / Hemodynamics: Refractory VF arrest    S/p NAZIA to proximal-mid LAD  Sinus tachycardia   Peripheral VA ECMO inserted for cardiac arrest. LA 16 initially. Suspect ongoing tachycardia d/t evolving MI and post-arrest state. ECMO decannulation at bedside on 5/19; IABP discontinued 5/20. 23 second run on VT overnight on 5/27.   TTE 6/10/20: EF 36% w/ LAD territory akinesis, RV normal   EKG 6/15: sinus tachycardia, QTc 481.   -continue ASA 81mg daily and ticagrelor 90mg BID  -continue metoprolol tartrate 25 mg BID   -continue lisinopril 5 mg   -continue atorvastatin 10 mg   -continue lasix 20 mg daily    Pulmonary: Acute hypoxic respiratory failure  COVID negative  Admission CT chest showed bilateral consolidations c/w aspiration PNA. Had thick secretions that required bagging by RT on 5/20. Bronchoscopy 5/21. Increasing oxygen requirements and pt-vent dyssynchrony on 5/22. Pulmonary re-consulted, infectious work up, pt sedated. 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 No active bleeding.    Vent settings this AM: PS 7/5 30%  ABG 5/31: 7.48/34/116/26   CXR 6/3: mild pulmonary edema    -s/p trach placement 6/8   -has been tolerating long periods of pressure support   -mucomyst and albuterol nebs PRN   -growing GNR in sputum; unasyn d/c'd, added vanc/zosyn 6/4 per ID  -vanc d/c'd 6/7 per ID  -discontinue zosyn per ID;  restarted 6/14 for ongoing low grade temp, elevated WBC, non-lactose fermenting GNR in sputum     -CXR as needed   -ABGs PRN   -ENT consult 5/27 for pressure injury of tongue; plan for follow up w/ them as outpatient for possible surgical repair of tongue    GI and Nutrition: Shock liver  GIB, resolved  Emesis    Three episodes of emesis since yesterday. Abd xray and ultrasound unremarkable. Loose stools which are not new.  -> 84, AST 52 -> 45. T bili 0.5. Lipase 407. PEG placement in IR 6/9.  -lactate 1.5   -TF on hold; restart at trickle tomorrow if no further emesis      -ongoing loose stools  -C diff culture 6/1 and 6/14 negative    -GI Prophylaxis: Protonix BID for UGIB   Renal, Fluid and Electrolytes: Acute kidney injury, resolved    Cr 1.14, BUN 28. Urine unmeasured d/t diaper. Na 142 today.   -continue FWF 30 mL q4hrs   -monitor I/O  -maintain K>3.8 and Mg>2    Infectious Disease: Aspiration pneumonia  Leukocytosis  WBC 13.7, tmax 98.2F. Grew GNR, acinetobacter (not baumannii) in sputum cx. Blood cx 6/1 grew GPCs, sputum cx 6/2 grew non-lactose fermenting GNR. ID consulted 6/3 for persistent fevers. Re-cultured on 6/13 for low grade temp and elevated WBC; blood cx negative, sputum cx grew acinetobacter (not baumannii). Repeat C diff and UA negative. CXR unchaned. Currently afebrile.   -vancomycin/zosyn x5 days (5/17-5/22) for asp PNA   -discontinued meropenem, changed to unasyn 5/29  -unasyn discontinued and added vanc/zosyn 6/4  -discontinued vanc 6/7, zosyn 6/10  -restarted zosyn 6/14 for ongoing low grade temp, elevated WBC, acinetobacter in sputum   -will empirically treat w/ zosyn x 7 days per ID    -6/11 added keflex for infection prophylaxis of ECMO decann site (d/t oozing); discontinued when zosyn restarted  -CT CAP negative for other source of infection   -scheduled acetaminophen changed to PRN   -C diff culture 6/1 and 6/14 negative      -monitor for signs of infection given lines and  "leukocytosis   Hematology and Oncology: Thrombocytosis, resolved    Plts 325 today. Receiving ASA/ticagrelor for NAZIA. Transfused 1 unit PRBCs 5/21. Hgb 10.4 today. No signs of active bleeding.    -right inguinal hematoma on US at old ECMO cannulae insertion site; serosanguinous oozing from lateral aspect of site noted yesterday   -peripheral smear showed normal platelet morphology   -daily CBC   -transfuse for Hgb<7   -DVT PPX: subcutaneous heparin    Endocrinology: No known medical history. BG elevated.  -not requiring insulin gtt  -HgbA1c 5.2   Lines:         R PICC line May 20, 2020 Restraint: not needed    Current lines are required for patient management       Family update by me today: Yes     Code Status: Full code     The pt was discussed and evaluated with Dr. Matamoros, attending physician, who agrees with the assessment and plan above.     Kala Chiang, DNP APRN CNP          Objective     BP 90/54   Pulse 118   Temp 97.5  F (36.4  C) (Axillary)   Resp 20   Ht 1.676 m (5' 6\")   Wt 85.4 kg (188 lb 4.4 oz)   SpO2 100%   BMI 30.39 kg/m    Temp:  [97.1  F (36.2  C)-101.1  F (38.4  C)] 97.5  F (36.4  C)  Heart Rate:  [101-131] 114  Resp:  [11-22] 20  BP: ()/(44-77) 90/54  FiO2 (%):  [30 %] 30 %  SpO2:  [96 %-100 %] 100 %  Wt Readings from Last 2 Encounters:   06/15/20 85.4 kg (188 lb 4.4 oz)     I/O last 3 completed shifts:  In: 2265 [I.V.:630; NG/GT:495]  Out: 1975 [Urine:1875; Stool:100]      GENERAL APPEARANCE: NAD.  HEENT: No icterus, PERRL 3 mm, trach in place.  CARDIOVASCULAR: sinus tachycardia, normal S1 and S2, no S3 or S4 and no murmur, click or rub. Normal PMI. Pulses dopplerable.  RESP: Clear bilaterally. Trach dome. Palpable left neck crepitus.  GASTRO: Soft, bowel sounds hyperactive. PEG in place.   GENITOURINARY: Deferred.  EXTREMITIES: Warm, no edema.    NEURO: Pupils equal and reactive, 3 mm. Moving extremities and opening eyes although not following commands. Withdraws to pain.  "   INTEGUMENTARY: No rashes. Line sites CDI. Bilaterally groin sites CDI; right groin ecchymotic, incision intact with area of firmness on medial aspect of incision, serosanguinous drainage from lateral aspect.  LINES/TUBES/DRAINS: PICC. Trach. PEG.           Data:     Vent Settings:  Resp: 20 SpO2: 100 % O2 Device: Trach dome Oxygen Delivery: Other (Comments)(30 lpm)    Arterial Blood Gas:   No lab results found in last 7 days.    Vitals:    20 0000 20 0200 06/15/20 0000   Weight: 86.8 kg (191 lb 5.8 oz) 85.5 kg (188 lb 7.9 oz) 85.4 kg (188 lb 4.4 oz)   I/O last 3 completed shifts:  In:  [I.V.:630; NG/GT:495]  Out:  [Urine:1875; Stool:100]  Recent Labs   Lab 06/15/20  0409 20  0414    141   POTASSIUM 3.3* 3.7   CHLORIDE 107 105   CO2 28 29   ANIONGAP 7 7   * 122*   BUN 28 27   CR 1.14 1.04   TEN 9.4 10.1     No components found for: URINE   Recent Labs   Lab 20  0428 20  0720   AST 45 52*   ALT 84* 112*   BILITOTAL 0.5 0.5   ALBUMIN 3.0* 2.9*   PROTTOTAL 8.4 7.6   ALKPHOS 144 148     Temp: 97.5  F (36.4  C) Temp src: AxillaryTemp  Min: 97.1  F (36.2  C)  Max: 101.1  F (38.4  C)   Recent Labs   Lab 06/15/20  0409 20  0414 20  0412 20  1437 20  0428   WBC 13.7* 15.3* 13.2* 15.2* 14.4*   HGB 10.4* 10.4* 10.4* 10.1* 10.1*   HCT 33.4* 34.0* 33.6* 33.1* 32.9*   MCV 96 95 97 97 96   RDW 14.0 13.8 14.1 14.4 14.5    396 371 406 413     No lab results found in last 7 days.  Recent Labs   Lab 06/15/20  0409 20  0414 20  0412 20  0428 20  0402   * 122* 102* 99 109*       All imaging personally reviewed:  Recent Results (from the past 24 hour(s))   Echocardiogram Complete    Narrative    [id number]  [id number]  [id number]  [id number]^GRIS^YOSHI           [facility],[place]  Echocardiography Laboratory  [address] [place] [id number]     Name: FAINA FORRESTER  MRN: [id number]  :  1980  Study Date: 05/18/2020 08:16 AM  Age: 39 yrs  Gender: Male  Patient Location: [facility]  Reason For Study: Cardiac Arrest  Ordering Physician: YOSHI LANDRY  Performed By: Denisse Johnson RDCS     BSA: 2.1 m2  Height: 66 in  Weight: 231 lb  BP: 132/58 mmHg  _____________________________________________________________________________  __        Procedure  Complete Portable Echo Adult. Technically difficult study.Extremely poor  acoustic windows.  _____________________________________________________________________________  __        Interpretation Summary  VA ECMO at 3.7L/min. Technically difficult study. Extremely poor acoustic  windows.  Severely (EF <30%) reduced left ventricular function on extremely limited  views.  The right ventricle cannot be assessed.  No pericardial effusion is present.  Previous study not available for comparison.  _____________________________________________________________________________  __        Left Ventricle  Left ventricular wall thickness cannot evaluate. Left ventricular size is  normal. Severely (EF <30%) reduced left ventricular function is present. Left  ventricular diastolic function is not assessable. Severe diffuse hypokinesis  is present.     Right Ventricle  The right ventricle cannot be assessed. Right ventricular function cannot be  assessed due to poor image quality.     Mitral Valve  The mitral valve cannot be assessed.        Aortic Valve  The aortic valve opens with each cardiac cycle. On Doppler interrogation,  there is no significant stenosis or regurgitation.     Tricuspid Valve  The tricuspid valve cannot be assessed. Pulmonary artery systolic pressure  cannot be assessed.     Pulmonic Valve  The pulmonic valve cannot be assessed.     Vessels  The aorta root cannot be assessed. The thoracic aorta cannot be assessed.  Venous ECMO cannula is visualized traversing the IVC.     Pericardium  No pericardial effusion is present.     Compared to Previous  Study  Previous study not available for comparison.     _____________________________________________________________________________  __                       Report approved by: Ashlee Izquierdo 05/18/2020 09:18 AM                 _____________________________________________________________________________  __                Medications     Current Facility-Administered Medications   Medication     0.9% sodium chloride BOLUS     0.9% sodium chloride BOLUS     acetaminophen (TYLENOL) tablet 650 mg     acetylcysteine (MUCOMYST) 10 % nebulizer solution 4 mL     artificial tears ophthalmic ointment     aspirin (ASA) chewable tablet 81 mg     atorvastatin (LIPITOR) tablet 10 mg     calcium chloride in  mL intermittent infusion 1 g     chlorhexidine (PERIDEX) 0.12 % solution 15 mL     dextrose 10% infusion     glucose gel 15-30 g    Or     dextrose 50 % injection 25-50 mL    Or     glucagon injection 1 mg     furosemide (LASIX) tablet 20 mg     heparin ANTICOAGULANT injection 5,000 Units     heparin lock flush 10 UNIT/ML injection 5-10 mL     heparin lock flush 10 UNIT/ML injection 5-10 mL     HOLD MEDICATION (one time)     ipratropium - albuterol 0.5 mg/2.5 mg/3 mL (DUONEB) neb solution 3 mL     levalbuterol (XOPENEX) neb solution 0.63 mg     levETIRAcetam (KEPPRA) solution 1,000 mg     lidocaine (LMX4) cream     lidocaine 1 % 0.1-1 mL     lisinopril (ZESTRIL) tablet 5 mg     magnesium sulfate 2 g in water intermittent infusion     magnesium sulfate 4 g in 100 mL sterile water (premade)     melatonin tablet 10 mg     metoprolol tartrate (LOPRESSOR) half-tab 25 mg     miconazole (MICATIN) 2 % powder     multivitamins w/minerals (CERTAVITE) liquid 15 mL     naloxone (NARCAN) injection 0.1-0.4 mg     ondansetron (ZOFRAN) injection 4 mg     pantoprazole (PROTONIX) 2 mg/mL suspension 40 mg     piperacillin-tazobactam (ZOSYN) 4.5 g vial to attach to  mL bag     potassium chloride (KLOR-CON) Packet 20-40  mEq     potassium chloride 10 mEq in 100 mL intermittent infusion with 10 mg lidocaine     potassium chloride 10 mEq in 100 mL sterile water intermittent infusion (premix)     potassium chloride 20 mEq in 50 mL intermittent infusion     potassium chloride ER (KLOR-CON M) CR tablet 20-40 mEq     prochlorperazine (COMPAZINE) injection 5 mg     QUEtiapine (SEROquel) tablet 25 mg     QUEtiapine (SEROquel) tablet 50 mg     senna-docusate (SENOKOT-S/PERICOLACE) 8.6-50 MG per tablet 1 tablet     simethicone (MYLICON) suspension 40 mg     sodium chloride (NEBUSAL) 3 % neb solution 3 mL     sodium chloride (PF) 0.9% PF flush 10-20 mL     sodium chloride (PF) 0.9% PF flush 3 mL     sodium chloride (PF) 0.9% PF flush 3 mL     sodium phosphate 10 mmol in D5W intermittent infusion     sodium phosphate 15 mmol in D5W intermittent infusion     sodium phosphate 20 mmol in D5W intermittent infusion     sodium phosphate 25 mmol in D5W intermittent infusion     ticagrelor (BRILINTA) tablet 90 mg     vitamin A-D & C drops (TRI-VI-SOL) drops 13 mL     zinc sulfate solution 220 mg

## 2020-06-15 NOTE — PLAN OF CARE
ICU End of Shift Summary. See flowsheets for vital signs and detailed assessment.    Changes this shift: Patient txfr to LTAC canceled 2/t repeat episode of emesis & inability to tolerate tube feeds. Remains afebrile & hemodynamically stable. No change to mental status.    Plan: continue to monitor patient, notify physician of changes.

## 2020-06-15 NOTE — PROGRESS NOTES
Care Coordinator - Discharge Planning    Admission Date/Time:  5/17/2020  Attending MD:  Jessica, Cardiologist, *     Data  Date of initial CC assessment:  5/22/20  Chart reviewed, discussed with interdisciplinary team.   Patient was admitted for:   1. Coronary artery disease due to lipid rich plaque    2. ST elevation MI (STEMI) (H)    3. Cardiac arrest (H)    4. Cardiogenic shock (H)    5. Posturing episode         Assessment   Full assessment completed in previous note    Coordination of Care and Referrals: Provided patient/family with options for LTACH.  Per MD team, patient is ready for transfer to [facility] today. Spoke with liaison, Golden ([phone]) who reports that they have a bed. Transport set up for 6 PM with [facility] transport (S level as patient will have only need for TD for transport. Provided doc to doc handoff  Number to CSI team and  Nurse handoff number to bedside RNRavi. Patient will be going to Room 222 in the [facility] at [facility]. Spoke to patient's father, Gerald. Provided update regarding transfer (pending transfer had been discussed in the past) to [facility]. Provided Gerald with phone number to facility along with patient's room number.   CN is aware that a packet will be needed for [facility] and the ambulance PCS form is in the chart.    Plan  Anticipated Discharge Date:  Today  Anticipated Discharge Plan:  [facility] LTACH    2 PM ADDENDUM: This RNCC received a call from I team reporting that patient is vomiting again. A CT/CLAY will be done. He is no longer medically ready for transfer to [facility]. I have notified Golden at [facility], [facility] Transport, CN, and bedside RN I have attempted to reach patient's father, Gerald multiple times but cannot connect. Will contact [facility] when determined to be medically ready for discharge.      Loyd Narayanan, RN Care Coordinator [phone]

## 2020-06-15 NOTE — PLAN OF CARE
ICU End of Shift Summary. See flowsheets for vital signs and detailed assessment.    Changes this shift: C/f  ST changes overnight. 12 lead EKG done. No significant changes noted, will continue to monitor. Crepitus around upper chest this am, team notified. K 3.3, replacing per prn orders. 50 mg seroquel x1 for agitation. Prn tylenol x2.     Plan: K replacement per orders. Will continue to monitor.

## 2020-06-15 NOTE — PLAN OF CARE
ICU End of Shift Summary. See flowsheets for vital signs and detailed assessment.    Changes this shift: Pt spiked temps throughout the shift accompanied by tachycardia in the 120's-130's, systolics in the 140's and general appearance of discomfort. MD's notified, Cx's for sputum, blood, urine and stool all sent. So far c.diff is negative.  Zosyn was re-started.  1X dose of oxy given and seemed to help, HR dropped back to baseline 110's and BP came down as well.  Updated Mom and girlfriend and also facilitated video chats. Inline secretions becoming increasingly bloody.    Plan: Continue to monitor closely for signs of infection.  Notify provders of any significant changes.  See if they will write for anything PRN for pain.  Get orders for sutures to be removed from trach. Get Hgb to followup on new bloody secretions.

## 2020-06-16 ENCOUNTER — HOSPITAL ENCOUNTER (OUTPATIENT)
Facility: CLINIC | Age: 40
End: 2020-06-16
Admitting: RADIOLOGY
Payer: COMMERCIAL

## 2020-06-16 ENCOUNTER — APPOINTMENT (OUTPATIENT)
Dept: GENERAL RADIOLOGY | Facility: CLINIC | Age: 40
End: 2020-06-16
Attending: NURSE PRACTITIONER
Payer: MEDICAID

## 2020-06-16 DIAGNOSIS — K94.20 GASTROSTOMY COMPLICATION (H): ICD-10-CM

## 2020-06-16 DIAGNOSIS — I46.9 CARDIAC ARREST (H): Primary | ICD-10-CM

## 2020-06-16 LAB
ANION GAP SERPL CALCULATED.3IONS-SCNC: 6 MMOL/L (ref 3–14)
BACTERIA SPEC CULT: ABNORMAL
BUN SERPL-MCNC: 22 MG/DL (ref 7–30)
CALCIUM SERPL-MCNC: 9.5 MG/DL (ref 8.5–10.1)
CHLORIDE SERPL-SCNC: 113 MMOL/L (ref 94–109)
CO2 SERPL-SCNC: 26 MMOL/L (ref 20–32)
CREAT SERPL-MCNC: 1.01 MG/DL (ref 0.66–1.25)
ERYTHROCYTE [DISTWIDTH] IN BLOOD BY AUTOMATED COUNT: 14.1 % (ref 10–15)
GFR SERPL CREATININE-BSD FRML MDRD: >90 ML/MIN/{1.73_M2}
GLUCOSE SERPL-MCNC: 92 MG/DL (ref 70–99)
HCT VFR BLD AUTO: 32.7 % (ref 40–53)
HGB BLD-MCNC: 9.8 G/DL (ref 13.3–17.7)
INTERPRETATION ECG - MUSE: NORMAL
Lab: ABNORMAL
Lab: ABNORMAL
MAGNESIUM SERPL-MCNC: 2.2 MG/DL (ref 1.6–2.3)
MCH RBC QN AUTO: 29.3 PG (ref 26.5–33)
MCHC RBC AUTO-ENTMCNC: 30 G/DL (ref 31.5–36.5)
MCV RBC AUTO: 98 FL (ref 78–100)
PHOSPHATE SERPL-MCNC: 3.9 MG/DL (ref 2.5–4.5)
PLATELET # BLD AUTO: 311 10E9/L (ref 150–450)
POTASSIUM SERPL-SCNC: 3.4 MMOL/L (ref 3.4–5.3)
RBC # BLD AUTO: 3.35 10E12/L (ref 4.4–5.9)
SODIUM SERPL-SCNC: 145 MMOL/L (ref 133–144)
SPECIMEN SOURCE: ABNORMAL
SPECIMEN SOURCE: ABNORMAL
WBC # BLD AUTO: 11.7 10E9/L (ref 4–11)

## 2020-06-16 PROCEDURE — 25000128 H RX IP 250 OP 636: Performed by: STUDENT IN AN ORGANIZED HEALTH CARE EDUCATION/TRAINING PROGRAM

## 2020-06-16 PROCEDURE — 25000128 H RX IP 250 OP 636: Performed by: NURSE PRACTITIONER

## 2020-06-16 PROCEDURE — 25000132 ZZH RX MED GY IP 250 OP 250 PS 637: Performed by: INTERNAL MEDICINE

## 2020-06-16 PROCEDURE — 83735 ASSAY OF MAGNESIUM: CPT | Performed by: STUDENT IN AN ORGANIZED HEALTH CARE EDUCATION/TRAINING PROGRAM

## 2020-06-16 PROCEDURE — 25000132 ZZH RX MED GY IP 250 OP 250 PS 637: Performed by: NURSE PRACTITIONER

## 2020-06-16 PROCEDURE — 25000132 ZZH RX MED GY IP 250 OP 250 PS 637: Performed by: STUDENT IN AN ORGANIZED HEALTH CARE EDUCATION/TRAINING PROGRAM

## 2020-06-16 PROCEDURE — 93010 ELECTROCARDIOGRAM REPORT: CPT | Performed by: INTERNAL MEDICINE

## 2020-06-16 PROCEDURE — 85027 COMPLETE CBC AUTOMATED: CPT | Performed by: STUDENT IN AN ORGANIZED HEALTH CARE EDUCATION/TRAINING PROGRAM

## 2020-06-16 PROCEDURE — 40000047 ZZH STATISTIC CTO2 CONT OXYGEN TECH TIME EA 90 MIN

## 2020-06-16 PROCEDURE — 84100 ASSAY OF PHOSPHORUS: CPT | Performed by: STUDENT IN AN ORGANIZED HEALTH CARE EDUCATION/TRAINING PROGRAM

## 2020-06-16 PROCEDURE — 80048 BASIC METABOLIC PNL TOTAL CA: CPT | Performed by: STUDENT IN AN ORGANIZED HEALTH CARE EDUCATION/TRAINING PROGRAM

## 2020-06-16 PROCEDURE — 93005 ELECTROCARDIOGRAM TRACING: CPT

## 2020-06-16 PROCEDURE — 25000125 ZZHC RX 250: Performed by: NURSE PRACTITIONER

## 2020-06-16 PROCEDURE — 40000275 ZZH STATISTIC RCP TIME EA 10 MIN

## 2020-06-16 PROCEDURE — 44500 INTRO GASTROINTESTINAL TUBE: CPT

## 2020-06-16 PROCEDURE — 25000132 ZZH RX MED GY IP 250 OP 250 PS 637

## 2020-06-16 PROCEDURE — 25800030 ZZH RX IP 258 OP 636: Performed by: NURSE PRACTITIONER

## 2020-06-16 PROCEDURE — 99233 SBSQ HOSP IP/OBS HIGH 50: CPT | Performed by: INTERNAL MEDICINE

## 2020-06-16 PROCEDURE — 40000986 XR ABDOMEN PORT 1 VW

## 2020-06-16 PROCEDURE — 20000004 ZZH R&B ICU UMMC

## 2020-06-16 RX ORDER — METOCLOPRAMIDE HYDROCHLORIDE 5 MG/5ML
5 SOLUTION ORAL 4 TIMES DAILY
Status: DISCONTINUED | OUTPATIENT
Start: 2020-06-16 | End: 2020-06-16

## 2020-06-16 RX ORDER — LIDOCAINE HYDROCHLORIDE 20 MG/ML
5 SOLUTION OROPHARYNGEAL ONCE
Status: COMPLETED | OUTPATIENT
Start: 2020-06-16 | End: 2020-06-16

## 2020-06-16 RX ORDER — SODIUM CHLORIDE 9 MG/ML
INJECTION, SOLUTION INTRAVENOUS CONTINUOUS
Status: DISCONTINUED | OUTPATIENT
Start: 2020-06-16 | End: 2020-06-25 | Stop reason: HOSPADM

## 2020-06-16 RX ORDER — METOCLOPRAMIDE HYDROCHLORIDE 5 MG/5ML
10 SOLUTION ORAL 2 TIMES DAILY
Status: DISCONTINUED | OUTPATIENT
Start: 2020-06-16 | End: 2020-06-16

## 2020-06-16 RX ORDER — METOCLOPRAMIDE HYDROCHLORIDE 5 MG/5ML
10 SOLUTION ORAL 2 TIMES DAILY PRN
Status: DISCONTINUED | OUTPATIENT
Start: 2020-06-16 | End: 2020-06-18

## 2020-06-16 RX ORDER — ONDANSETRON 2 MG/ML
4 INJECTION INTRAMUSCULAR; INTRAVENOUS EVERY 8 HOURS
Status: DISCONTINUED | OUTPATIENT
Start: 2020-06-16 | End: 2020-06-16

## 2020-06-16 RX ORDER — ONDANSETRON 2 MG/ML
4 INJECTION INTRAMUSCULAR; INTRAVENOUS EVERY 8 HOURS PRN
Status: DISCONTINUED | OUTPATIENT
Start: 2020-06-16 | End: 2020-06-18

## 2020-06-16 RX ADMIN — Medication 40 MG: at 11:43

## 2020-06-16 RX ADMIN — Medication 25 MG: at 22:40

## 2020-06-16 RX ADMIN — HEPARIN SODIUM 5000 UNITS: 5000 INJECTION, SOLUTION INTRAVENOUS; SUBCUTANEOUS at 16:50

## 2020-06-16 RX ADMIN — CHLORHEXIDINE GLUCONATE 0.12% ORAL RINSE 15 ML: 1.2 LIQUID ORAL at 10:03

## 2020-06-16 RX ADMIN — Medication 40 MG: at 22:46

## 2020-06-16 RX ADMIN — LIDOCAINE HYDROCHLORIDE 5 ML: 20 SOLUTION ORAL; TOPICAL at 14:09

## 2020-06-16 RX ADMIN — Medication 13 ML: at 11:43

## 2020-06-16 RX ADMIN — ONDANSETRON 4 MG: 2 INJECTION INTRAMUSCULAR; INTRAVENOUS at 09:17

## 2020-06-16 RX ADMIN — SODIUM CHLORIDE: 9 INJECTION, SOLUTION INTRAVENOUS at 16:49

## 2020-06-16 RX ADMIN — ONDANSETRON 4 MG: 2 INJECTION INTRAMUSCULAR; INTRAVENOUS at 20:54

## 2020-06-16 RX ADMIN — METOCLOPRAMIDE HYDROCHLORIDE 10 MG: 5 SOLUTION ORAL at 10:03

## 2020-06-16 RX ADMIN — Medication 13 ML: at 22:46

## 2020-06-16 RX ADMIN — MICONAZOLE NITRATE: 20 POWDER TOPICAL at 10:13

## 2020-06-16 RX ADMIN — CHLORHEXIDINE GLUCONATE 0.12% ORAL RINSE 15 ML: 1.2 LIQUID ORAL at 22:40

## 2020-06-16 RX ADMIN — QUETIAPINE FUMARATE 50 MG: 50 TABLET ORAL at 22:40

## 2020-06-16 RX ADMIN — QUETIAPINE FUMARATE 25 MG: 25 TABLET ORAL at 10:03

## 2020-06-16 RX ADMIN — HEPARIN SODIUM 5000 UNITS: 5000 INJECTION, SOLUTION INTRAVENOUS; SUBCUTANEOUS at 00:48

## 2020-06-16 RX ADMIN — TICAGRELOR 90 MG: 90 TABLET ORAL at 22:39

## 2020-06-16 RX ADMIN — MELATONIN TAB 3 MG 10 MG: 3 TAB at 22:39

## 2020-06-16 RX ADMIN — Medication 25 MG: at 10:04

## 2020-06-16 RX ADMIN — PIPERACILLIN AND TAZOBACTAM 4.5 G: 4; .5 INJECTION, POWDER, FOR SOLUTION INTRAVENOUS at 13:29

## 2020-06-16 RX ADMIN — CHLORHEXIDINE GLUCONATE 0.12% ORAL RINSE 15 ML: 1.2 LIQUID ORAL at 16:49

## 2020-06-16 RX ADMIN — LEVETIRACETAM 1000 MG: 100 SOLUTION ORAL at 22:46

## 2020-06-16 RX ADMIN — ACETAMINOPHEN 650 MG: 325 TABLET, FILM COATED ORAL at 06:15

## 2020-06-16 RX ADMIN — PIPERACILLIN AND TAZOBACTAM 4.5 G: 4; .5 INJECTION, POWDER, FOR SOLUTION INTRAVENOUS at 00:48

## 2020-06-16 RX ADMIN — MULTIVITAMIN 15 ML: LIQUID ORAL at 10:03

## 2020-06-16 RX ADMIN — POTASSIUM CHLORIDE 20 MEQ: 1.5 POWDER, FOR SOLUTION ORAL at 05:22

## 2020-06-16 RX ADMIN — Medication 220 MG: at 11:42

## 2020-06-16 RX ADMIN — LISINOPRIL 5 MG: 5 TABLET ORAL at 10:03

## 2020-06-16 RX ADMIN — MICONAZOLE NITRATE: 20 POWDER TOPICAL at 20:15

## 2020-06-16 RX ADMIN — LEVETIRACETAM 1000 MG: 100 SOLUTION ORAL at 11:44

## 2020-06-16 RX ADMIN — PIPERACILLIN AND TAZOBACTAM 4.5 G: 4; .5 INJECTION, POWDER, FOR SOLUTION INTRAVENOUS at 06:11

## 2020-06-16 RX ADMIN — TICAGRELOR 90 MG: 90 TABLET ORAL at 09:58

## 2020-06-16 RX ADMIN — FUROSEMIDE 20 MG: 20 TABLET ORAL at 10:04

## 2020-06-16 RX ADMIN — ATORVASTATIN CALCIUM 10 MG: 10 TABLET, FILM COATED ORAL at 22:40

## 2020-06-16 RX ADMIN — ACETAMINOPHEN 650 MG: 325 TABLET, FILM COATED ORAL at 22:40

## 2020-06-16 RX ADMIN — HEPARIN SODIUM 5000 UNITS: 5000 INJECTION, SOLUTION INTRAVENOUS; SUBCUTANEOUS at 10:03

## 2020-06-16 RX ADMIN — PIPERACILLIN AND TAZOBACTAM 4.5 G: 4; .5 INJECTION, POWDER, FOR SOLUTION INTRAVENOUS at 18:10

## 2020-06-16 RX ADMIN — ASPIRIN 81 MG CHEWABLE TABLET 81 MG: 81 TABLET CHEWABLE at 09:58

## 2020-06-16 ASSESSMENT — ACTIVITIES OF DAILY LIVING (ADL)
ADLS_ACUITY_SCORE: 18

## 2020-06-16 ASSESSMENT — MIFFLIN-ST. JEOR: SCORE: 1698.75

## 2020-06-16 NOTE — PROGRESS NOTES
CLINICAL NUTRITION SERVICES - REASSESSMENT NOTE     Nutrition Prescription    RECOMMENDATIONS FOR MDs/PROVIDERS TO ORDER:  Fluids and bowel regimen per team   Recommend scheduled Reglan dosing as appropriate with continue Zofran  Recommend reaching out to IR for possible need for J extension.     Malnutrition Status:     Severe malnutrition in the context of acute on chronic illness    Recommendations already ordered by Registered Dietitian (RD):  Recommend fecal elastase ( Lipase was slightly elevated)      Future/Additional Recommendations:  If Emesis continues, Recommend J extension to PEG tube. Pt was tolerating enteral nutrition via NDT prior to PEG placement and was receiving full nutrition at that time.   Could possibly trial Peptamen 1.5 @ 60ml/hr.   Continue to monitor weight trends and wound healing, split tongue is currently stable (will likely need surgery)  Monitor stool trends and need for additional fiber supplementation     EVALUATION OF THE PROGRESS TOWARD GOALS   Diet: NPO  Nutrition Support: Impact Peptide @ goal 60 ml/hr (1440 ml/day) to provide 2160 kcals (31 kcal/kg/day), 135 g PRO (1.9 g/kg/day), 1109 ml free H2O, 92 g Fat (50% from MCTs), 202 g CHO and no Fiber daily.       Intake:  Average TF intakes over the past 7 days: 638 ml,  957 kcals, 60 g pro      NEW FINDINGS   Weight: 20% weight loss in the past 1 month. Suspect some weight loss 2/2 to fluids. Current weight 84.6 kg   Labs: Lipase: 407 (H)  Meds: Continue scheduled zofran  GI: 6/9: PEG placement today at bedside. Patient with ongoing loose stool, Cdiff negative 6/14.   Skin: Significantly deteriorated now has split tongue 5/27. Stable on 6/7-   6/14- Pt keep clenching his teeth and not following command. So unable to visualize the area. RN reports having hard time with oral care as pt keeps his teeth clamped. Pt has tracheostomy in place and no pressure from ETT on the tongue.  Resp: Trach Dome     Dosing weight: 70 kg (adjusted,  based on IBW of 64.5 kg and current wt of 89 kg).       Energy needs: 1750 - 2100+ kcal/day (25-30 kcal/kg/day)   Justification: Maintenance, obese   Protein needs: 105 - 140+ g protein/day (1.5 - 2 g/kg/day)   Justification: Increased needs, obese     MALNUTRITION  % Intake: </= 50% for >/= 5 days (severe)  % Weight Loss: > 5% in 1 month (severe)  Subcutaneous Fat Loss: Unable to assess  Muscle Loss: Unable to assess  Fluid Accumulation/Edema: Unable to assess  Malnutrition Diagnosis: Severe malnutrition in the context of acute on chronic illness    Previous Goals   Total avg nutritional intake to meet a minimum of 25 kcal/kg and 1.5 g PRO/kg daily (per dosing wt 71 kg).  Evaluation: Not met    Previous Nutrition Diagnosis  Increased nutrient needs kcals/pro related to wound healing ( although not staged) as evidenced by increased protein needs of 1.5-2 g/kg pro+    Evaluation: No change    CURRENT NUTRITION DIAGNOSIS  Inadequate protein-energy intake related to gastric intolerance to tube feeds with ongoing emesis as evidenced by pt meeting < 50% of ordered TF regimen over the past 7 days.     INTERVENTIONS  Implementation  Collaboration with other providers- Spoke with provider. Recommended scheduled Reglan and possible J extension if able    Goals  Total avg nutritional intake to meet a minimum of 25 kcal/kg and 1.5 g PRO/kg daily (per dosing wt 71 kg).    Monitoring/Evaluation  Progress toward goals will be monitored and evaluated per protocol.      Deandra Troy, RD, MS, LD  SICU: [phone] *[phone]

## 2020-06-16 NOTE — PROGRESS NOTES
[facility] INFECTIOUS DISEASES PROGRESS NOTE     Patient:  Scot Bishop   YOB: 1980, MRN: [id number]  Date of Visit: 06/16/2020  Date of Admission: 5/17/2020  Consult Requester: Invasive, Cardiologist, *          Assessment and Recommendations:   ID Problem List:  1. Fever, recurrent - resolved   2. Leukocytosis - improving   3. Acinetobacter sp. (not baumannii) - sputum cultures (5/20-6/14/20)   4. Blood culture with MRSE (1/2 on 6/1/20, neg since 6/3/20)   5. Right groin fluid collection  6. Acute respiratory failure  7. STEMI with PEA/VF out of hospital arrest s/p NAZIA to LAD  8. Hypoxic brain injury  9. VA-ECMO (5/17-5/19)    Recommendations:  1. Continue  Zosyn to complete 7 day course (6/14-6/21)    Discussion:  Scot Bishop is a 39 year old male with no significant known past medical history who presented to [facility] on 5/17/20 after a PEA/V Fib arrest and STEMI. Was taken to the cath lab emergently and had NAZIA placed to LAD, found to have LAD thrombotic occlusion s/p successful aspiration thrombectomy, was cannulated for VA ECMO (5/17-5/19). ID was consulted on 6/3 due to fevers of uncertain etiology despite antibiotics.     Previously have not noticed a strong correlation between starting/stopping antibiotics and changes to fever curve. No clear source of infection has been identified, although he has grown Acinetobacter (not baumanni) on multiple sputum cultures  (5/20, 5/22, 5/27, 6/2, 6/14). Sputum 6/2 also grew Candida. 1/2 blood cultures 6/1 with MRSE which is thought to reflect contamination as all subsequent BC have been negative. Other possible sources of infection considered include tongue as he has had tongue ulceration/split, although he has been followed by [facility] and tongue has not appeared infectious.      Imaging has also been largely non-revealing as well. CT chest 5/17 showed lungs with effusions c/w and atelectasis/consolidation in setting of recent arrest. CXR on 6/3 with  increased LLL opacity. CT Chest/abd/pelvis on 6/3 with some GGO though no consolidation, and fluid collection in right groin with reactive lymphadenopathy. Subsequent US showed fluid collections and no evidence of pseudoaneurysm. Hematoma vs abscess though at this point favor hematoma.     Unclear source of fever. May have had aspiration vs oral source of infection with existing tongue aspiration. Fever curve improved since restarting Zosyn on 6/14, so would continue to complete empiric course of 7 days. Hematoma one potential source of fevers that has yet to be evaluated, although groin appears non-infectious. Central fevers remain in the differential given hypoxic ischemic brain injury, however, he was afebrile 6/5-6/13. During that time Zosyn was stopped on 6/10 and he was switched to cephalexin. Zosyn now resumed since 6/14 with recurrence of fever up to 101.1. Unclear that changes to antibiotics have made substantial impact on fever curve.      ID will sign off. Please do not hesitate to contact with additional questions or concerns as they arise.    Michelle Canales PA-C   Infectious Diseases  Pager: [phone]  06/16/2020    Physician Attestation   I, Gary Knutson, saw and evaluated Scot Bishop as part of a shared visit.  I have reviewed and discussed with the advanced practice provider their history, physical and plan.    I personally reviewed the vital signs, medications, labs and imaging.    My key history or physical exam findings:   agree with plans outlined by Ms Ally RAND. Note edited by me.   afebrile, WBC is improving. no acute distress      Key management decisions made by me:   continue Zosyn x 7 days. reevaluate at that time   sputum with Candida albicans, unable to examine mouth,  oral care , consider fluconazole 100 mg TF daily x7 days     ID will sign off     Gary Knutson MD,M.Med.Sc.  Infectious Diseases  Pager: [phone]   Date of Service (when I saw the  "patient): 06/16/20         Interval History and Events:   Afebrile, WBC down to 11.7. No acute events. Sputum with Acinetobacter and Candida which is similar to previous. BC NGTD. Working on increasing TF prior to discharge.          Antimicrobial Treatment:   Current:  - Zosyn restarted 6/14-6/21; (6/3-6/10)     Prior:  - Unasyn (5/29-6/3)  - Meropenem (5/22-5/29)  - Zosyn (5/17-5/22)  - Vancomycin (6/3-6/7; 5/17-5/23)  - Cephalexin 6/11-6/14          Review of Systems:   Unable to complete ROS as not answering questions.         HPI:   Adopted from initial consult note on 6/3:    Scot Bishop is a 39 year old male with no significant known past medical history who presented to [facility] on 5/17/20 after a PEA/V Fib arrest and STEMI. Was taken to the cath lab emergently and had NAZIA placed to LAD, found to have LAD thrombotic occlusion s/p successful aspiration thrombectomy, was cannulated for VA ECMO (5/17-5/19). Per H&P \"Pt reportedly had chest pain that started on 5/16/20. He was using Drano on his pipes at home and did not initially seek medical attention for CP and SOB since it said on the box that Drano can cause those symptoms. He took a shower and had syncopal episode after coming out of the shower. EMS was called; initial rhythm asystole. With chest compressions pt eventually had PEA and then eventually had VF in the field. After multiple cycles of epinephrine had ROSC. He was intubated in the field.\" Concern for diffuse anoxic brain injury, neurology has been following. MRI (5/26/20) with difuse acute/subacute infarcts c/w hypoxic ischemic brain injury and diffuse microhemorrhage likely ECMO related. Also with Acute hypoxic respiratory failure and Acinetobacter (naut baumanni) on sputum cultures. Fever to 101.5F on 5/22 and has continued to have intermittent fevers since, temperature to 102F on 5/28, has been persistently febrile >100.6 since 6/1 despite scheduled tylenol. Wound care RN and ENT have been " following for tongue ulceration and split.     Patient has no known medical history and unclear if he was feeling sick prior to chest pain onset on 5/16. 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 He lives with his significant other and has joint custody of two children.         Physical Examination:   Temp:  [97.4  F (36.3  C)-99  F (37.2  C)] 99  F (37.2  C)  Heart Rate:  [] 116  Resp:  [10-28] 13  BP: ()/(54-71) 105/70  FiO2 (%):  [30 %] 30 %  SpO2:  [99 %-100 %] 100 %    I/O last 3 completed shifts:  In: 1669 [I.V.:729; NG/GT:520]  Out: 1900 [Urine:1900]    Vitals:    06/11/20 0400 06/13/20 0000 06/14/20 0200 06/15/20 0000   Weight: 85.4 kg (188 lb 4.4 oz) 86.8 kg (191 lb 5.8 oz) 85.5 kg (188 lb 7.9 oz) 85.4 kg (188 lb 4.4 oz)    06/16/20 0000   Weight: 84.6 kg (186 lb 8.2 oz)       Constitutional: Adult male seen lying in bed, trach with small amount of bloody discharge  HEENT: NC/AT, pupils equal and round, sclera clear, conjunctiva normal, unable to exam mouth  Respiratory: No increased work of breathing, CTAB, no crackles or wheezing.  Cardiovascular: RRR, no murmur noted.   GI: Normal bowel sounds, soft, non-distended.  Skin: Warm, dry, well-perfused. Right groin incision c/d/i, no erythema, small amount of serosanguinous fluid on bandage, no purulence  Musculoskeletal: Extremities grossly normal, non-tender, no edema.   Neurologic: Non-responsive, does not follow commands. Moves extremities and opens eyes spontaneously           Medications:       aspirin  81 mg Per Feeding Tube Daily     atorvastatin  10 mg Oral QPM     chlorhexidine  15 mL Swish & Spit 4x Daily     furosemide  20 mg Oral Daily     heparin ANTICOAGULANT  5,000 Units Subcutaneous Q8H GABRIELLE     heparin lock flush  5-10 mL Intracatheter Q24H     levETIRAcetam  1,000 mg Oral or Feeding Tube BID     lidocaine  5 mL Topical Once     lisinopril  5 mg Oral Daily     melatonin  10 mg Oral At Bedtime      metoprolol tartrate  25 mg Oral BID     miconazole   Topical BID     multivitamins w/minerals  15 mL Per Feeding Tube Daily     pantoprazole  40 mg Oral or Feeding Tube BID     piperacillin-tazobactam  4.5 g Intravenous Q6H     QUEtiapine  25 mg Oral Daily     QUEtiapine  50 mg Oral or Feeding Tube At Bedtime     sodium chloride (PF)  3 mL Intracatheter Q8H     ticagrelor  90 mg Oral or Feeding Tube BID     vitamin A-D & C drops  13 mL Per G Tube BID     zinc sulfate  220 mg Oral Daily       Antiinfectives:  Anti-infectives (From now, onward)    Start     Dose/Rate Route Frequency Ordered Stop    06/14/20 1200  piperacillin-tazobactam (ZOSYN) 4.5 g vial to attach to  mL bag      4.5 g  over 30 Minutes Intravenous EVERY 6 HOURS 06/14/20 1108            Infusions/Drips:    dextrose 1,000 mL (06/09/20 0737)     sodium chloride              Laboratory Data:     Microbiology:  Culture Micro   Date Value Ref Range Status   06/14/2020 (A)  Final    Light growth  Acinetobacter species, not baumannii  Identification obtained by MALDI-TOF mass spectrometry research use only database. Test   characteristics determined and verified by the Infect[facility] Diagnostic Laboratory   ([facility]) [place], [place].  Susceptibility testing done on previous specimen     06/14/2020 (A)  Final    Light growth  Candida albicans / dubliniensis  Candida albicans and Candida dubliniensis are not routinely speciated  Susceptibility testing not routinely done     06/14/2020 No growth after 2 days  Preliminary   06/14/2020 No growth after 2 days  Preliminary   06/13/2020 (A)  Final    Moderate growth  Acinetobacter species, not baumannii  Identification obtained by MALDI-TOF mass spectrometry research use only database. Test   characteristics determined and verified by the Infectious Diseases Diagnostic Laboratory   ([facility]) [place], [place].     06/13/2020 (A)  Final    Light growth  Candida albicans / dubliniensis  Candida albicans and Candida  myriamiensis are not routinely speciated  Susceptibility testing not routinely done     06/13/2020 No growth after 3 days  Preliminary   06/13/2020 No growth after 3 days  Preliminary   06/04/2020 No growth  Final   06/03/2020 No growth  Final       Metabolic Studies     Recent Labs   Lab Test 06/16/20  0428 06/15/20  1440 06/15/20  0409 06/14/20  0414  05/25/20  0448   *  --  142 141   < > 154*   POTASSIUM 3.4  --  3.3* 3.7   < > 3.6   CHLORIDE 113*  --  107 105   < > 123*   CO2 26  --  28 29   < > 23   ANIONGAP 6  --  7 7   < > 8   BUN 22  --  28 27   < > 38*   CR 1.01  --  1.14 1.04   < > 1.09   GFRESTIMATED >90  --  79 89   < > 84   GLC 92  --  132* 122*   < > 104*   A1C  --   --   --   --   --  5.2   TEN 9.5  --  9.4 10.1   < > 8.6   PHOS 3.9  --   --   --    < > 3.0   MAG 2.2  --   --   --    < > 2.2   LACT  --  1.5  --   --    < > 1.1    < > = values in this interval not displayed.       Hepatic Studies    Recent Labs   Lab Test 06/12/20 0428 06/09/20 0720 06/04/20 0416 05/20/20  0356   BILITOTAL 0.5 0.5 0.6   < > 0.6   ALKPHOS 144 148 127   < > 44   ALBUMIN 3.0* 2.9* 3.0*   < > 2.6*   AST 45 52* 57*   < > 382*   ALT 84* 112* 120*   < > 461*   LDH  --   --   --   --  1,181*    < > = values in this interval not displayed.       Pancreatitis testing    Recent Labs   Lab Test 06/12/20 0428   LIPASE 407*   TRIG 255*       Hematology Studies      Recent Labs   Lab Test 06/16/20  0428 06/15/20  0409 06/14/20  0414  06/02/20  1059   WBC 11.7* 13.7* 15.3*   < > 14.1*   ANEU  --   --   --   --  10.0*   ALYM  --   --   --   --  2.0   TANA  --   --   --   --  1.3   AEOS  --   --   --   --  0.6   HGB 9.8* 10.4* 10.4*   < > 9.0*   HCT 32.7* 33.4* 34.0*   < > 29.3*    325 396   < > 642*    < > = values in this interval not displayed.       Arterial Blood Gas Testing    Recent Labs   Lab Test 06/06/20  0228   PH 7.44   PCO2 35   PO2 154*   HCO3 24   O2PER 30.0        Urine Studies     Recent Labs   Lab  Test 06/14/20  1221 06/03/20  1207 06/01/20  2143   URINEPH 5.0 5.5 6.5   NITRITE Negative Negative Negative   LEUKEST Negative Negative Trace*   WBCU <1 0 3       Vancomycin Levels     Recent Labs   Lab Test 06/07/20  0001 05/20/20  1748   VANCOMYCIN 13.5 6.9       Last check of C difficile  C Diff Toxin B PCR   Date Value Ref Range Status   06/14/2020 Negative NEG^Negative Final     Comment:     Negative: C. difficile target DNA sequences NOT detected, presumed negative   for C.difficile toxin B or the number of bacteria present may be below the   limit of detection for the test.  FDA approved assay performed using [employer] GeneXpert real-time PCR.  A negative result does not exclude actual disease due to C. difficile and may   be due to improper collection, handling and storage of the specimen or the   number of organisms in the specimen is below the detection limit of the assay.              Imaging:     Results for orders placed or performed during the hospital encounter of 05/17/20   CT Head w/o Contrast    Narrative    CT HEAD W/O CONTRAST 5/17/2020 3:57 PM    Provided History: Altered level of consciousness (LOC), unexplained    Comparison: None.    Technique: Using multidetector thin collimation helical acquisition  technique, axial, coronal and sagittal CT images from the skull base  to the vertex were obtained without intravenous contrast.     Findings:  Images are moderately limited due to venous contrast  contamination due to prior contrast administration.  No intracranial mass effect, or midline shift. The ventricles are  proportionate to the cerebral sulci. The gray to white matter  differentiation of the cerebral hemispheres is preserved. The basal  cisterns are patent.    Scattered mucosal thickening within the paranasal sinuses and complete  opacification of the right nasal cavity is of uncertain clinical  significance in this patient with intubation. The mastoid air cells  are clear.        Impression    Impression: No acute intracranial pathology.    I have personally reviewed the examination and initial interpretation  and I agree with the findings.    SACHA SOTELO MD   CT Chest Abdomen Pelvis w/o Contrast    Narrative    EXAMINATION: CT CHEST ABDOMEN PELVIS W/O CONTRAST, 5/17/2020 4:02 PM    TECHNIQUE:  Helical CT images from the lung bases through the  symphysis pubis were obtained without IV contrast.     COMPARISON: None    HISTORY: Status post cardiac arrest    FINDINGS:  LINES and TUBES: Gastric tube tip is coiled within the stomach. Right  common femoral vein ECMO cannula tip terminates in the superior  cavoatrial junction. Right common femoral arterial cannula tip  terminates in the junction of the right aortoiliac bifurcation. Left  common femoral venous catheter tip terminates in the proximal IVC.  Left common femoral artery approach balloon pump inferior metallic  marker in the infrarenal abdominal aorta just distal to the renal  artery origins and the proximal metallic marker is in the proximal  descending thoracic aorta.    CHEST:  LUNGS: Endotracheal tube tip terminates in the mid thoracic trachea  the central tracheobronchial tree is patent and. Debris opacifying of  the right lower lobar pulmonary bronchi. Dense bilateral dependent  pulmonary consolidations with air bronchograms. Mild hazy groundglass  opacities seen anteriorly to the dependent consolidations. No large  pulmonary mass. No interlobular septal thickening. No pneumothorax or  pleural effusion.    MEDIASTINUM: Unremarkable thyroid the ascending aorta and main  pulmonary artery are normal in caliber. Severe atherosclerotic  calcifications of the left anterior descending coronary artery. No  pericardial effusion. No anterior mediastinal hematoma. No enlarged  thoracic lymph nodes.     ABDOMEN/PELVIS:  LIVER: Unremarkable noncontrast appearance of the liver.  GALLBLADDER: Within normal limits.  PANCREAS: 3 mm round  hypodensity within the pancreatic tail likely  representing pancreatic cyst or IPMN. No suspicious pancreatic mass.  No pancreatic ductal dilation.  SPLEEN: Within normal limits.  ADRENAL GLANDS: Within normal limits.  URINARY TRACT: Kidneys are normal in size and position without  suspicious renal mass. Contrast opacification of the renal calyces and  pelvis secondary to recent coronary angiogram. Contrast opacification  of the ureters bilaterally without focal defects. The urinary bladder  is unremarkable with Silva catheter in place and balloon inflated.  REPRODUCTIVE ORGANS: Unremarkable appearance of the prostate and  seminal vesicles  BOWEL: Dilated fluid-filled loops of small bowel measuring up to 3.0  cm.  No focal transition point. The large bowel is normal in caliber.  No significant inflammatory change about the loops of bowel.    PERITONEUM/FLUID: No free fluid or air in the abdomen or pelvis.    VESSELS: No aneurysmal dilation of the infrarenal abdominal aorta.  Vascular patency cannot be assessed.    LYMPH NODES: No lymphadenopathy in the abdomen or pelvis.    BONES/SOFT TISSUES: Mild compression type deformities of T7-T9. No  suspicious osseous lesions.. Soft tissue fat stranding in the right  groin secondary to line placement.      Impression    IMPRESSION:   1. Dependent bilateral pleural effusions with associated  atelectasis/consolidation, possible aspiration in the setting of  recent cardiac arrest.  2. Borderline dilated fluid-filled loops of small bowel measuring up  to 3.0 cm, likely result of hypotension.   3. Support devices are in appropriate position.  4. 3 mm round hypodensity in the pancreatic tail favor to represent  cyst or IPMN. Consider nonemergent follow-up MR.  5. Diffuse hepatic steatosis.    I have personally reviewed the examination and initial interpretation  and I agree with the findings.    CRISTINA MATTSON MD   XR Chest Port 1 View    Narrative    EXAMINATION:  XR Chest  one vw 5/17/2020 4:47 PM.    COMPARISON: Same-day CT at 1549 hours.    HISTORY:  Endotracheal tube placement. ECMO cannula placement    FINDINGS: Portable view of the chest. Endotracheal tube tip projects  over the proximal/mid trachea. Esophageal temperature probe projects  over the proximal esophagus. Gastric tube tip and sidehole project  over the gastric body. Inferior approach ECMO cannula projects over  the right atrium. Intra-aortic balloon pump metallic marker projects  over the proximal descending thoracic aorta at the level of the  chaim. Pacer pad projects over the left hemithorax. Linear metallic  densities project over the lateral right hemithorax, favored to BE  external to patient as these were not demonstrated on comparison CT  and are atypical in appearance.    Midline trachea. Heart is within normal limits. Pulmonary vasculature  is distinct. Minimally increased haziness throughout bilateral lung  fields compatible with known dependent consolidations. No pleural  effusion or pneumothorax. Upper abdomen is unremarkable.      Impression    IMPRESSION:   1. Endotracheal tube tip projects over the proximal/mid trachea.  2. Inferior approach ECMO cannula tip projects over the high right  atrium.  3. Intra-aortic balloon pump metallic marker projects over the  proximal descending thoracic aorta in appropriate position.  4. Bilateral hazy pulmonary opacities compatible with bilateral  dependent atelectasis as seen on same-day CT.    I have personally reviewed the examination and initial interpretation  and I agree with the findings.    CRISTINA MATTSON MD   XR Chest Port 1 View    Narrative    XR CHEST PORT 1 VW  5/18/2020 1:48 AM    History:  Check endotracheal tube placement and ECLS cannula  placement. DO NOT log-roll patient.  Place film under patient using  patient safety handling process..     Comparison: Chest radiograph dated 5/17/2020    Findings:   Supine portable AP chest radiograph.  Endotracheal tube with the tip  projects 4.5 cm above chaim. Intra-aortic pump metallic clip at the  level of chaim, unchanged. Stable gastric tube and inferior ECMO  catheter. Temperature probe with the tip projects over mid esophagus.    Cardiac silhouette is at upper normal limit. Pulmonary vasculature is  relatively distinct. No significant change in bilateral interstitial  and airspace opacities. Low lung volumes. No pneumothorax or  significant pleural effusion.      Impression    IMPRESSION:  1.  Temperature probe is advanced with the tip projects over mid  esophagus. Otherwise stable supportive lines and tubes.  2.  No significant change in bilateral interstitial and airspace  opacities, which may represent pulmonary edema, atelectasis versus  infection.    I have personally reviewed the examination and initial interpretation  and I agree with the findings.    DARWIN BRUNO MD   US Lower Extremity Arterial Duplex Bilateral    Narrative    Exam: Duplex ultrasound of bilateral lower extremity arteries dated  5/17/2020 9:23 PM     Clinical information: Baseline to assess blood flow to Lower  Extremities (VA ECMO)     Comparison: None    Technique: Grayscale (B-mode), color Doppler, and duplex spectral  Doppler ultrasound of the lower extremity arteries. Velocity  measurements obtained with angle correction of 60 degrees or less.    Findings:     Right lower extremity:     Common femoral artery: ECMO present  Profunda femoral artery: Obscured  Proximal SFA: Velocity: 34 cm/sec. Waveforms: Monophasic  Mid SFA:Velocity: Obscured  Distal SFA: Velocity: Obscured  Popliteal artery: Velocity: 33 cm/sec. Waveforms: Monophasic    PTA ankle: Velocity: 20 cm/sec. Waveforms: Monophasic  CATRACHITA ankle at mid calf: Velocity: 16 cm/sec. Waveforms: Monophasic    Left lower extremity:    Common femoral artery: Bandage covered  Deep femoral artery: Obscured  Proximal SFA: Obscured  Mid SFA: Velocity: 97 cm/sec. Waveforms:  Biphasic  Distal SFA: Velocity: 84 cm/sec. Waveforms: Biphasic    Popliteal artery, proximal: Velocity: 50 cm/sec. Waveforms: Biphasic      PTA ankle: Velocity: 38 cm/sec. Waveforms: Biphasic  CATRACHITA ankle: Velocity: 26 cm/sec. Waveforms: Biphasic      Impression    Impression:     1. Right leg: Limited evaluation due to multifocal bandage coverage  and obscured vessels.  The visualized proximal superficial femoral,  popliteal, posterior tibia and anterior tibia arteries are patent  without evidence of hemodynamically significant stenosis.    2. Left leg: Limited evaluation due to multifocal bandage coverage and  obscured vessels.  The visualized mid and distal superficial femoral,  popliteal, posterior tibia and anterior tibia arteries are patent  without evidence of hemodynamically significant stenosis.    Guidelines:    [facility] duplex criteria for lower limb arterial  occlusive disease    Percent stenosis:     Normal (1-19%): Peak systolic velocity (cm/s): <150, End-diastolic  velocity (cm/s): <40, Velocity ratio (Vr): <1.5, Distal arterial  waveform: Triphasic    20-49%: Peak systolic velocity (cm/s): 150-200, End-diastolic velocity  (cm/s): <40, Velocity ratio (Vr): 1.5-2.0, Distal arterial waveform:  Triphasic    50-75%: Peak systolic velocity (cm/s): 200-300, End-diastolic velocity  (cm/s): <90, Velocity ratio (Vr): 2.0-3.9, Distal arterial waveform:  Poststenotic turbulence distal to stenosis, monophasic distal waveform    >75%: Peak systolic velocity (cm/s): >300, End-diastolic velocity  (cm/s): <90, Velocity ratio (Vr): >4.0, Distal arterial waveform:  Dampened distal waveform and low PSV/EDV* in the stenosis    Occlusion: Absent flow by color Doppler/pulsed Doppler spectral  analysis; length of occlusion estimated from distance between exit and  reentry collateral arteries    *PSV = peak systolic velocity, EDV = end-diastolic  velocity  http://[email or web address]  html    I have personally reviewed the examination and initial interpretation  and I agree with the findings.    YUMI OLEARY MD   US Carotid Bilateral    Narrative    Exam: Bilateral carotid duplex Doppler ultrasound dated 5/17/2020 9:23  PM    Clinical history: Cardiac Arrest on ECMO    Comparison Study: None    Technique: Grayscale (B-mode) and duplex and spectral Doppler  ultrasound of the extracranial internal carotid, external carotid,  vertebral artery origins, right brachiocephalic/subclavian and left  subclavian arteries. Velocity measurements obtained with angle  correction at or less than 60 degrees.    Findings:    Right side:     Plaque Morphology: Predominantly echogenic, Smooth    Mid CCA: 57 cm/sec     External CA: 59 cm/sec       Proximal ICA: 38 cm/sec     Mid ICA: 32 cm/sec     Distal ICA: 44 cm/sec       Vertebral artery: Antegrade  ICA/CCA ratio: 0.56    Left side:     Plaque Morphology: Predominantly echogenic, Irregular       Mid CCA: 60 cm/sec     External CA: 44 cm/sec       Proximal ICA: 30 cm/sec     Mid ICA: 47 cm/sec     Distal ICA: 60 cm/sec       Vertebral artery: Antegrade  ICA/CCA ratio: 0.78      Impression    Impression:    1. Right side:        Degree of stenosis of the internal carotid artery: Less than 50 %.      2. Left side:         Degree of stenosis of the internal carotid artery: Less than 50 %.    Consensus Panel Gray-Scale and Doppler US Criteria for Diagnosis of  ICA Stenosis (Radiology 11/2003) additionally modified by Ervin et  al. in Journal of Vascular Surgery 1/2011, (74)18-15.       Normal         ICA PSV < 140 cm/sec       Plaque Estimate None       ICA/CCA  PSV Ratio < 2.0       ICA EDV < 40 cm/sec       < 50%          ICA PSV < 140 cm/sec       Plaque Estimate < 50%       ICA/CCA  PSV Ratio < 2.0       ICA EDV < 40 cm/sec       50- 69%       ICA -230 cm/sec       Plaque  Estimate > or = 50%       ICA/CCA PSV Ratio 2.0-4.0       ICA EDV  cm/sec         > or = 70%, less than near occlusion       ICA PSV > 230 cm/sec       Plaque Estimate > or = 50%       ICA/CCA Ratio > 4.0       ICA EDV > 100 cm/sec                                            Additional criteria from vascular surgery     > 80%       EDV > 120 cm/sec     I have personally reviewed the examination and initial interpretation  and I agree with the findings.    YUMI OLEARY MD   XR Chest Port 1 View    Narrative    XR CHEST PORT 1 VW  5/19/2020 1:52 AM    History:  Check endotracheal tube placement and ECLS cannula  placement. DO NOT log-roll patient.  Place film under patient using  patient safety handling process..     Comparison: Chest radiograph dated 5/18/2020    Findings:   Portable AP supine chest radiograph. Endotracheal tube with the tip  projects 4.4 cm above chaim. Stable gastric tube, inferior ECMO  cannula, temperature probe and intra-aortic pump.    Mild cardiomegaly. No significant change in perihilar and bibasilar  hazy opacities. No pneumothorax or significant pleural effusion.      Impression    IMPRESSION:  1.  Stable supportive lines, tubes and devices.  2.  No substantial change in perihilar opacities, which may represent  pulmonary edema versus atelectasis.  3.  Unchanged bibasilar hazy opacities, atelectasis versus  consolidation.    I have personally reviewed the examination and initial interpretation  and I agree with the findings.    KEIRA KAY MD   XR Chest Port 1 View    Narrative    XR CHEST PORT 1 VW  5/19/2020 11:24 PM    History:  IABP Placement.     Comparison: chest radiograph dated 5/19/2020.    Findings:   Supine portable AP chest radiograph. Interval removal of inferior ECMO  cannula. Endotracheal tube with the tip projects 4.6 cm above chaim.  Stable gastric tube and intra-aortic pump. Advancement of the  temperature probe now with the tip in the distal  esophagus.    Stable mild cardiomegaly. Low lung volumes. Interval slightly  increased right perihilar and right upper lobe hazy opacities. No  significant change in bibasilar hazy opacities. No pneumothorax or  significant pleural effusion.      Impression    IMPRESSION:  1.  Interval removal of the inferior ECMO cannula. Advancement of the  temperature probe with the tip in the distal esophagus. The remaining  lines and medical device are stable.  2.  Interval increased right perihilar and right upper lobe hazy  opacities, which may represent worsening of pulmonary edema versus  infection.    I have personally reviewed the examination and initial interpretation  and I agree with the findings.    CRISTINA MATTSON MD   XR Chest Port 1 View    Narrative    XR CHEST PORT 1 VW  5/21/2020 1:35 AM    History:  Check endotracheal tube placement and ECLS cannula  placement. DO NOT log-roll patient.  Place film under patient using  patient safety handling process..     Comparison: Chest radiograph dated 5/20/2020    Findings:   Supine portable AP chest radiograph. Endotracheal tube with the tip  projects 7.1 cm above chaim. Right upper extremity PICC line with the  tip projects over high right atrium. Stable gastric tube and  temperature probe.    Cardiac silhouette is at upper normal limits. Interval increased right  perihilar and right upper lobe hazy opacities. No pneumothorax or  significant pleural effusion. Osseous structures are unchanged.      Impression    IMPRESSION:  1.  Endotracheal tube with the tip projects 7.1 cm above chaim,  consider advancement for 3 to 4 cm. Otherwise stable supportive lines  and tubes.  2.  Interval increased right perihilar and the upper lobe hazy  opacities, which may represent pulmonary edema versus worsening of  infection.    I have personally reviewed the examination and initial interpretation  and I agree with the findings.    TASHA WARD MD   US Upper Extremity Venous  Duplex Left Portable    Narrative    Examination:  Left upper extremity venous US 5/20/2020 5:41 AM     Comparison: None.    History: evaluate for dvt, bruising with warmth, but diffuse anasarca    Findings:     Left side:   The internal jugular, innominate, subclavian, and axillary veins  demonstrate normal phasicity. The internal jugular, axillary,  brachial, cephalic, and basilic veins are fully compressible without  evidence of internal thrombus.      Impression    Impression:   No evidence for DVT in left upper extremity.    I have personally reviewed the examination and initial interpretation  and I agree with the findings.    HILLARY DIXON MD   XR Chest Port 1 View    Narrative    XR CHEST PORT 1 VW  5/20/2020 7:07 PM      HISTORY: picc line placement    COMPARISON: 5/19/2020    FINDINGS: Right upper actually PICC tip projects over the high right  atrium. Esophageal temperature probe tip projects over the low  thoracic esophagus. Endotracheal tube tip projects over the esophagus  at the thoracic inlet. Partially visualized enteric tube. Cardiac  silhouette is stable. Lung volumes are slightly increased. Streaky  perihilar and bibasilar predominant opacities consistent with  atelectasis.      Impression    IMPRESSION: Right upper extremity PICC tip projects over the high  right atrium.    I have personally reviewed the examination and initial interpretation  and I agree with the findings.    KEDAR STOCK MD   XR Chest Port 1 View    Narrative    XR CHEST PORT 1 VW  5/22/2020 1:26 AM    History:  Check endotracheal tube placement and ECLS cannula  placement. DO NOT log-roll patient.  Place film under patient using  patient safety handling process.  39 year old male who was admitted on  5/17/2020 for cardiac arrest.     Comparison: Chest radiograph dated 5/21/2020    Findings:   Semiupright AP chest radiograph. Endotracheal tube with tip projects  3.9 cm above chaim. Stable right upper extremity PICC  line,  temperature probe, and gastric tube.    Cardiac silhouette is within normal limits. No substantial change in  perihilar hazy opacities. No pneumothorax or significant pleural  effusion.      Impression    IMPRESSION:  1.  Stable supportive lines and tubes.  2.  Unchanged perihilar hazy opacities, which may represent pulmonary  edema versus atelectasis/consolidation.    I have personally reviewed the examination and initial interpretation  and I agree with the findings.    REYNALDO ROY MD   XR Chest Port 1 View    Narrative    Portable chest IV 2120 [id number] hours    INDICATION: PICC line placement    COMPARISON: 5/21/2020 0132 hours     FINDINGS: Heart size and shape. Normal. Endotracheal tube is present  with tip approximately 3.5 cm above the cahim. The lungs and  pulmonary vascularity there appear grossly unremarkable with improved  interstitial and perihilar opacities. A right upper extremity PICC  line is present with tip in the proximal right atrium.      Impression    IMPRESSION: Improved interstitial and perihilar opacities. Right PICC  with tip in right atrium.    NATIVIDAD CHAMBERLAIN MD   XR Chest Port 1 View    Narrative    XR CHEST PORT 1 VW  5/23/2020 2:19 AM    History:  Check endotracheal tube placement and ECLS cannula  placement. DO NOT log-roll patient.  Place film under patient using  patient safety handling process..     Comparison: Chest radiograph dated 5/22/2020    Findings:   Semiupright AP chest radiograph. Endotracheal tube with the tip  projects at 3.5 cm above chaim. Stable right upper and the PICC line,  gastric tube and temperature probe.    Cardiac silhouette is within normal limits. Improved bilateral  perihilar hazy opacities. Pulmonary vasculature is mildly indistinct.  No pneumothorax or significant pleural effusion.      Impression    IMPRESSION:  1.  Stable supportive lines and tubes.  2.  Interval decreased perihilar hazy opacities, likely representing  improved pulmonary  edema.    I have personally reviewed the examination and initial interpretation  and I agree with the findings.    KEDAR STOCK MD   XR Chest Port 1 View    Narrative    EXAM: XR CHEST PORT 1 VW  5/22/2020 8:30 AM      HISTORY: Hypoxia.    COMPARISON: Chest x-ray from 5/22/2020 (1:26 AM) and 5/21/2020    FINDINGS: Semiupright portable AP radiograph of the chest. Right upper  extremity PICC with tip projecting over the cavoatrial junction. The  enteric tube courses inferior to the diaphragm and out of the field of  view. Tip of the temperature probe projects over the esophagus. The  tip of the endotracheal tube is approximately 2 cm above the chaim.    Trachea is midline. The cartilage silhouette is unchanged. Stable  perihilar and basilar patchy opacities. No pleural effusion or  pneumothorax. No acute osseous or abdominal abnormality.      Impression    IMPRESSION:  1. Stable perihilar and basilar patchy opacities, likely pulmonary  edema versus infection.  2. The tip of the endotracheal tube is approximately 2 cm above the  chaim. Stable positioning of the remaining support devices.    I have personally reviewed the examination and initial interpretation  and I agree with the findings.    LEANA MOREIRA MD   XR Chest Port 1 View    Narrative    XR CHEST PORT 1 VW  5/24/2020 2:50 AM    History:  Check endotracheal tube placement and ECLS cannula  placement. DO NOT log-roll patient.  Place film under patient using  patient safety handling process..     Comparison: Chest radiograph dated 5/23/2020    Findings:   Semiupright AP chest radiograph. Endotracheal tube with the tip  projects 4.1 cm above chaim. Stable right upper extremity PICC line,  gastric tube and temperature probe.    Cardiac silhouette is within normal limits. Slightly improved  perihilar hazy opacities. No pneumothorax or significant pleural  effusion.      Impression    IMPRESSION:  1.  Stable supportive lines and tubes.  2.  Interval slightly  improved perihilar hazy opacities, likely  representing pulmonary edema.    I have personally reviewed the examination and initial interpretation  and I agree with the findings.    MICHELLE HAMILTON MD   XR Chest Port 1 View    Narrative    XR CHEST PORT 1 VW  5/24/2020 12:13 PM      HISTORY: ett placement    COMPARISON: Earlier same day    FINDINGS: Endotracheal tube tip projects over the mid to upper  thoracic trachea. Esophageal temperature probe tip pulled back to the  thoracic inlet. Other support devices are unchanged. Stable cardiac  silhouette and pulmonary vasculature. Hazy perihilar and bibasilar  opacities are unchanged. More focal opacity left lower lobe.      Impression    IMPRESSION:  1. Endotracheal tube tip projects over the mid to upper thoracic  trachea.  2. Unchanged perihilar opacities. Favor to be edema.  3. Left lower lobe atelectasis.    I have personally reviewed the examination and initial interpretation  and I agree with the findings.    MICHELLE HAMILTON MD   XR Chest Port 1 View    Narrative    EXAMINATION: XR CHEST PORT 1 VW, 5/25/2020 11:14 AM    INDICATION: aspiration pneumonia    COMPARISON: Chest x-ray 5/24/2020    FINDINGS: Single portable upright AP radiograph of the chest. ET tube,  esophageal temperature probe, right PICC line and enteric tube are  stable in positioning. Trachea is midline. Cardiomediastinal  silhouette is stable. Similar appearing hilar opacities. Interstitial  and airspace opacities. No pleural effusion. No pneumothorax.       Impression    IMPRESSION:  1. Interstitial and airspace opacities throughout with prominent  opacities in the michelle bilaterally. May represent pulmonary edema,  aspiration, infection.  2. Stable support devices.    I have personally reviewed the examination and initial interpretation  and I agree with the findings.    KEDAR STOCK MD   CT Head w/o Contrast    Narrative    CT HEAD W/O CONTRAST 5/25/2020 4:42 PM    Provided History: s/p cardiac  arrest    Comparison: Head CT 5/17/2020    Technique: Using multidetector thin collimation helical acquisition  technique, axial, coronal and sagittal CT images from the skull base  to the vertex were obtained without intravenous contrast.     Findings: Multiple regions of abnormal hypoattenuation with loss of  gray-white differentiation involving both cerebral hemispheres, most  conspicuous in the left cerebral hemisphere (for instance series 3;  images 26 and 30 ). Also questionable diffuse loss of gray-white  matter differentiation throughout both cerebral hemispheres., Although  images are mildly degraded by artifact related to multiple  extracranial leads.    No intracranial hemorrhage, mass effect, or midline shift. The  ventricles are proportionate to the cerebral sulci. The basal cisterns  are patent.    Frontal sinus mucosal hypertrophy. Fluid and mucosal hypertrophy  within the ethmoid and sphenoid sinuses. Mucosal hypertrophy of the  maxillary sinuses, right greater than left. Mild bilateral mastoid air  cell effusions.    Partial visualization of endotracheal tube and enteric tube.      Impression    Impression:   1. Multifocal acute/subacute cerebral infarcts in addition to probable  diffuse loss of gray-white matter differentiation which can be seen in  diffuse hypoxic injury.  2. New mastoid effusions and paranasal sinus fluid and mucosal  hypertrophy, nonspecific in the setting of intubation.    I have personally reviewed the examination and initial interpretation  and I agree with the findings.    TASHA JACKSON MD   XR Abdomen Port 1 View    Narrative    EXAMINATION: XR ABDOMEN PORT 1 VW, 5/26/2020 12:29 PM    COMPARISON: CT 5/17/2020    HISTORY: feeding tube placement    FINDINGS: Feeding tube at the ligament of Treitz. NG/OG tube overlies  the stomach. No air-filled dilated small bowel loops.      Impression    IMPRESSION: Feeding tube at the ligament of Treitz with NG/OG tube  overlying the  stomach.     DARWIN BRUNO MD   MR Brain w/o Contrast    Narrative    MR BRAIN W/O CONTRAST 5/26/2020 5:09 PM    Provided History: CTH w/ multifocal acute/subacute infarcts. Cardiac  arrest    Comparison:  Head CT dated 5/25/2020     Technique: Sagittal T1-weighted and axial T2-weighted, turboFLAIR and  diffusion-weighted with ADC map images of the brain were obtained  without intravenous contrast.    Findings: Multifocal diffusion restriction involving bilateral  cerebral hemispheres, corpus callosum and left temporoparietal White  matter. Diffuse microhemorrhage involving entire cerebral and the  cerebellum. No significant mass effect or midline shift. No abnormal  extra-axial fluid collection. The ventricles and sulci are normal for  age. Normal intravascular flow voids.    The visualized orbits are unremarkable. Mucosal thickening noted in  the bilateral sphenoid, right maxillary and right frontal sinuses.  Diffuse opacification of bilateral mastoid air cells.      Impression    Impression:  1.  Diffuse acute/subacute infarcts in bilateral cerebral hemispheres,  corpus callosum and periventricular white matter, consistent with  evolving diffuse hypoxic ischemic brain injury.  2.  Diffuse microhemorrhage involving infra and supratentorial brain,  likely ECMO related.    I have personally reviewed the examination and initial interpretation  and I agree with the findings.    JENNIFER CORBETT MD   XR Chest Port 1 View    Narrative    EXAM: XR CHEST PORT 1 VW  5/27/2020 10:00 AM      HISTORY: aspiration pneumonia    COMPARISON: Chest x-ray from 5/25/2020 and 5/24/2020.    FINDINGS: Semiupright portable AP radiograph of the chest. Right upper  extremity PICC tip projects over the cavoatrial junction. The feeding  tube and NG/OG tube course inferior to the diaphragm and out of the  field of view. The tip of the NG/OG tube appears to be coiled in the  fundus. The tip of the endotracheal tube is approximately 4 cm  above  the chaim.    Low lung volumes. Trachea is midline, heart size is normal. No pleural  effusion or pneumothorax. Unchanged perihilar and left basilar  opacities. No acute osseous or abdominal abnormality.      Impression    IMPRESSION:  1. Unchanged perihilar and left basilar opacities, likely pulmonary  edema versus aspiration versus atelectasis.  2. Appropriate positioning of the support devices.    I have personally reviewed the examination and initial interpretation  and I agree with the findings.    LEANA MOREIRA MD   XR Chest Port 1 View    Narrative    EXAM: XR CHEST PORT 1 VW    HISTORY: Endotracheal tube placement    COMPARISON: 5/27/2020    FINDINGS: Portable AP view of the chest. Endotracheal tube tip  projects over the mid thoracic trachea. Gastric tube and enteric tube  in stable position. Right PICC tip projects over the superior  cavoatrial junction.    Midline trachea. Cardiomediastinal silhouette is stable. Pulmonary  vasculature is distinct. Right upper lung subsegmental atelectasis. No  pleural effusion or pneumothorax. Upper abdomen is unremarkable.      Impression    IMPRESSION:  1. Endotracheal tube tip projects over the mid thoracic trachea.  2. Right upper lobe subsegmental atelectasis.  3. Pulmonary vascular congestion.    I have personally reviewed the examination and initial interpretation  and I agree with the findings.    CRISTINA MATTSON MD   XR Chest Port 1 View    Narrative    EXAM: XR CHEST PORT 1 VW  6/1/2020 6:58 PM     HISTORY:  fever       COMPARISON:  5/20/2020    FINDINGS: Single view of the chest. Single view of the chest. Enteric  tube sidehole projects over the left upper quadrant in expected  location of the stomach with the tip pointing superiorly towards the  GE junction. Stable positioning of right upper extremity PICC and  partially visualized feeding tube. Endotracheal tube tip projects over  the midthoracic trachea, similar to prior. No focal airspace  opacity.  No pleural effusion. No pneumothorax. Cardiomediastinal silhouette is  unchanged. Decreased haziness of the pulmonary vasculature.      Impression    IMPRESSION:   1. Stable support devices.  2. No focal airspace opacity.    I have personally reviewed the examination and initial interpretation  and I agree with the findings.    KEIRA KAY MD   XR Chest Port 1 View    Narrative     Examination:  XR CHEST PORT 1 VW     Date:  6/3/2020 9:47 AM      Clinical Information: fever     Additional Information: none    Comparison: 6/1/2020 chest x-ray.    Findings:     The single film of the chest demonstrates a small amount of airspace  changes in the left base retrocardiac region. There is a slightly  increased over the 6/1/2020 examination could represent a developing  pneumonia. No pleural effusions are present.    The ET tube is in good position. The right PICC line has its tip  projected in the lower SVC.      Impression    Impression:    1. Concern for developing left lower lobe pneumonia..    HILLARY DIXON MD   CT Chest Abdomen Pelvis w/o Contrast    Narrative    EXAMINATION: CT CHEST ABDOMEN PELVIS W/O CONTRAST, 6/3/2020 2:40 PM    TECHNIQUE:  Helical CT images from the thoracic inlet through the  symphysis pubis were obtained without IV contrast.     COMPARISON: CT abdomen pelvis 5/17/2020    HISTORY: persistent fevers    FINDINGS:    Lines and tubes: Right sided central catheter tip in the superior  cavoatrial junction. Endotracheal tube 3 cm above the chaim. Enteric  tube in the distal stomach. Feeding tube in the jejunum. Rectal tube  in mid rectum.    CHEST:  LUNGS: Patchy area of groundglass in the right upper lobe (series 4,  image 40). Posterior bibasilar curvilinear opacities likely  representing fibroatelectasis; resolution of dense consolidative  opacities seen on prior CT dated 5/17/2020. No mass or consolidation.  No pleural effusion or pneumothorax. The airway is  patent.    MEDIASTINUM: Heart size is within normal limits. No pericardial  effusion. No thoracic lymphadenopathy. Thyroid is unremarkable.    ABDOMEN/PELVIS:    Limited evaluation due to noncontrast technique.      Hypodense collection in the right inguinal region measuring 5 x 2.6  cm, abutting the right common femoral vascular bundle; there is  additional collection with fluid fluid level and hyperdense dependent  contents anterior to this collection measuring 4.8 x 3.9 cm. Prominent  inguinal lymph nodes right more than left.      LIVER: No focal liver lesion. No intrahepatic biliary ductal  dilatation.    BILIARY: No calcified gallstone.    PANCREAS: No pancreatic ductal dilatation. Unchanged 3 mm hypodensity  in the pancreatic tail    SPLEEN: Within normal limits.    ADRENAL GLANDS: Within normal limits.    URINARY TRACT: Within normal limits.    REPRODUCTIVE ORGANS: Within normal limits.    STOMACH: Within normal limits.    BOWEL: Normal caliber of the small and large bowel.  Appendix is  within normal limits.    PERITONEUM/FLUID: No free fluid.    VESSELS: No aneurysmal dilatation of the abdominal aorta. Scattered  atherosclerotic calcification of the abdominal aorta.    LYMPH NODES: No lymphadenopathy.    BONES/SOFT TISSUES: No aggressive osseous lesions. Pars defects  bilaterally at L5 demonstrated with no significant anterolisthesis.  Multiple subcutaneous anterior abdominal wall nodules, possibly  injection granuloma.      Impression    IMPRESSION: In this patient with history of persistent fever, the  current scan shows:   1. Hypodense collection measuring up to 5 cm in the right inguinal  region abutting the femoral vascular bundle with additional collection  anterior to this collection measuring up to 4.8 cm with fluid/fluid  level and dependent hyperdense contents; collections may represent  hematoma/abscess and/or pseudoaneurysm. Consider right inguinal  Doppler ultrasound for further  evaluation.  2. No intra-abdominal or intrathoracic abscess or collection.  3. Patchy groundglass opacity in the right upper lobe may represent  atelectasis versus inflammatory change.  4. Support devices as described above.    I have personally reviewed the examination and initial interpretation  and I agree with the findings.    KEIRA KAY MD   US Lower Extremity Arterial rt    Narrative    Exam: US LOWER EXTREMITY ARTERIAL RT, 6/3/2020 4:30 PM    Indication: fluid collection on CT CAP; ?hematoma/abscess vs  pseudoaneurysm    Comparison: CT 6/3/2020    Findings:   Redemonstration of multiple fluid collections overlying the right  common femoral artery and common femoral vein. The lateral pleural  collection measures 6.2 x 5.5 x 3.1 cm and does not demonstrate  Doppler flow. The medial collection measures 3.3 x 5.5 x 3.3 cm and  does not demonstrate Doppler flow.    Normal arterial waveform in the right common femoral artery with  velocity of 61 cm/s.      Impression    Impression:   1. No evidence of pseudoaneurysm or arteriovenous fistula.  2. Slightly increased size of right groin fluid collections which may  represent hematomas.    I have personally reviewed the examination and initial interpretation  and I agree with the findings.    YUMI OLEARY MD   CT Head w/o Contrast    Narrative    CT HEAD W/O CONTRAST 6/3/2020 5:38 PM    Provided History: not moving LLE  ICD-10:    Comparison: MR dated 5/26/2020. CT dated 5/25/2020.    Technique: Using multidetector thin collimation helical acquisition  technique, axial, coronal and sagittal CT images from the skull base  to the vertex were obtained without intravenous contrast.     Findings:    No intracranial hemorrhage, mass effect or midline shift. The  ventricles are proportionate to the cerebral sulci. Diffuse low  contrast differentiation between the cerebral gray matter and white  matter are not significantly changed from 5/25/2020. The basal  cisterns  are patent.    Frontal and sphenoid sinus mucosal thickening. Scant fluid in the  dependent mastoid air cells.       Impression    Impression:   1. No change in imaging features of diffuse hypoxic ischemic injury.  2. No evident new intracranial pathology.    I have personally reviewed the examination and initial interpretation  and I agree with the findings.    TASHA JACKSON MD   XR Chest Port 1 View    Narrative    EXAMINATION:  XR CHEST PORT 1 VW 6/8/2020 2:16 PM.    COMPARISON: Chest x-ray and CT chest abdomen pelvis 6/3/2020    HISTORY:  S/p perc tracheosomy placement    FINDINGS: AP radiograph the chest at 30 degrees. Interval placement of  tracheostomy tube, with tip projecting over the high thoracic trachea.  Right arm PICC tip projects over the low SVC. Partially visualized  enteric tube.    Trachea is midline. Cardiac silhouette and pulmonary vasculature are  within normal limits. No pleural effusion. No pneumothorax. Unchanged  mild streaky bibasilar opacities, likely representing atelectasis.      Impression    IMPRESSION: Interval placement of tracheostomy tube, with tip  projecting over the high thoracic trachea. Probable areas of  atelectasis in the lung bases, recommend follow-up to clearing to  exclude infection.    I have personally reviewed the examination and initial interpretation  and I agree with the findings.    NATIVIDAD CHAMBERLAIN MD   IR Gastrostomy Tube Percutaneous Plcmnt    Narrative    PROCEDURES:  1. Gastrostomy tube placement under fluoroscopic guidance    CLINICAL HISTORY: STEMI. Prolonged hospitalization. Need for  nutritional access.    COMPARISONS: CT chest 6/3/2020    STAFF RADIOLOGIST: SAUMYA Laguna MD    FELLOW(S)/RESIDENT(S): SUSAN Adam MD    I, FAINA LAGUNA MD, attest that I was present in the procedure room for  the entire procedure.     Medications: The patient was placed on continuous monitoring.  Intravenous sedation was administered.  Vital signs and sedation  monitored by  nursing staff under Interventional Radiologist's  supervision. The patient remained stable throughout the procedure.    ATTENDING FACE-TO-FACE SEDATION TIME: 20 minutes    FLUOROSCOPY TIME: 2.7 minutes.    DOSE: 40 mGy    PROCEDURE: Informed consent was obtained prior to the procedure.    Pre-procedural ultrasound performed to delineate the liver margins.    The left upper quadrant was prepped and draped in the usual sterile  fashion. 1% lidocaine without epinephrine was used for local  anesthesia.     Glucagon administered (1 mg). Existing feeding tube was withdrawn into  the stomach under fluoroscopic guidance. Stomach inflated with air  through the existing feeding tube. Under fluoroscopic guidance,  gastropexy was made with 2 [employer] Saf-T-Pexy T-fasteners.    Needle gastrostomy made under fluoroscopic guidance. Needle removed  over guidewire. 22 Norwegian peel-away sheath advanced into the stomach  over the guidewire. 18 Norwegian [employer] YOUNG gastrostomy tube with  ENFit connector advanced over the guidewire through the peel-away  sheath into the stomach under fluoroscopic guidance. Peel-away sheath  and guidewire removed. Gastrostomy tube balloon inflated and tube  secured. Adequate placement in the stomach documented with contrast.     Fluoroscopic image documenting gastrostomy tube placement and patency  with contrast was saved in the patient's record.    Tube flushed with saline. Sterile dressing applied. Gastrostomy tube  placed to gravity drainage. Nasogastric tube removed. No immediate  complication.      Impression    IMPRESSION:   1. 18 Norwegian [employer] YOUNG gastrostomy tube with ENFit connector  placed under fluoroscopic guidance. Catheter to gravity drainage.    I have personally reviewed the examination and initial interpretation  and I agree with the findings.    FAINA LAGUNA MD   XR Chest Port 1 View    Narrative    Exam: Chest radiograph, 6/12/2020 8:24 AM    Indication: Elevated  WBC    Comparison: 6/8/2020    Findings:   Upright AP radiograph. Right arm PICC tip projects at the cavoatrial  junction. Tracheostomy tube is in a stable position. Enteric tube has  been removed. Cardiomediastinal silhouette is within normal limits.  Midline trachea. No pneumothorax or effusion. No focal airspace  opacity. Improved mild bibasilar streaky opacities. No new airspace  opacity.       Impression    Impression:   1. Enteric tube has been removed. Additional support devices are  stable.  2. Improved mild bibasilar streaky opacities, likely atelectasis. No  new airspace opacity.    I have personally reviewed the examination and initial interpretation  and I agree with the findings.    LEANA MOREIRA MD   XR Abdomen Port 1 View    Narrative    Exam: XR ABDOMEN PORT 1 VW, 6/12/2020 8:16 AM    Indication: Vomiting    Comparison: 6/3/2020, 5/26/2020.    Findings:   A single supine AP view of the abdomen was obtained. The percutaneous  gastrostomy tube balloon projects over the stomach. Nonobstructive  bowel gas pattern. No pneumatosis or portal venous gas appreciated.  The visualized lung bases are clear. Degenerative changes in the  lumbar spine.      Impression    Impression:   Nonobstructive bowel gas pattern. The percutaneous gastrostomy tube  projects over the stomach.    TASHA ROBERT MD   US Abdomen Limited    Narrative    EXAMINATION: US ABDOMEN LIMITED  6/12/2020 1:04 PM      CLINICAL HISTORY: Bilious emesis.    COMPARISON: Correlation made with CT chest abdomen pelvis dated  6/3/2020        FINDINGS:  The liver is diffusely echogenic and measures 17.7 cm. There is no  intrahepatic or extrahepatic biliary ductal dilatation. The common  bile duct measures 2.4 mm.     The gallbladder is normal, without gallstones, wall thickening, or  pericholecystic fluid. Sonographic Zambrano's sign is negative.    The visualized upper abdominal aorta and inferior vena cava are normal  in appearance and  caliber.    The right kidney measures 11.1 cm and is normal in position and  echogenicity. There is no significant urinary tract dilatation.         Impression    IMPRESSION:   The liver is mildly enlarged and echogenic, consistent with diffuse  fatty infiltration.    I have personally reviewed the examination and initial interpretation  and I agree with the findings.    TASHA ROBERT MD   XR Chest Port 1 View    Narrative    Exam: XR CHEST PORT 1 VW, 6/13/2020 7:29 AM    Indication: Assess for VAP    Comparison: 6/12/2020    Findings:   Portable upright AP radiograph of the chest. Tracheostomy tube tip  projects over the upper trachea. Right upper extremity PICC tip  projects over the low SVC. Stable cardiac silhouette. Coronary stent.  No pneumothorax or pleural effusion although the right costophrenic  angle is incompletely imaged. Mild streaky right perihilar opacities.  No acute bony abnormalities. The upper abdomen is unremarkable.        Impression    Impression:   1. Stable position of supportive devices.  2. Mild streaky right perihilar opacities, favored to represent  subsegmental atelectasis.    I have personally reviewed the examination and initial interpretation  and I agree with the findings.    TASHA ROBERT MD   XR Chest Port 1 View    Narrative    EXAM: XR CHEST PORT 1 VW  6/14/2020 11:43 AM      HISTORY: fever, tachycardia, GNR sputum    COMPARISON: Previous day.     TECHNIQUE: Frontal view of the chest.    FINDINGS:   Right arm PICC line tip in stable position at the cavoatrial junction.  Left coronary stent. Stable position of the tracheostomy cannula lower  thoracic trachea.     Left cervical subcutaneous emphysema appears grossly stable. No acute  bony abnormalities. Cardiomediastinal borders are clear. No  enlargement of the cardiac silhouette. No appreciable pneumothorax or  pleural effusions. Stable perihilar opacities.       Impression    IMPRESSION:  1. Mild perihilar opacities. Stable  support devices.  2. Stable left cervical subcutaneous emphysema.    I have personally reviewed the examination and initial interpretation  and I agree with the findings.    MICHELLE HAMILTON MD   XR Chest Port 1 View    Narrative    EXAM: XR CHEST PORT 1 VW 6/15/2020 9:42 AM    HISTORY: Subcutaneous emphysema    COMPARISON: 2020    FINDINGS: Portable AP view of the chest. Right PICC tip projects over  the superior cavoatrial junction. Tracheostomy tube tip projects over  the mid thoracic trachea.    Midline trachea. Cardiomediastinal silhouette is stable. Pulmonary  vasculature is distinct. Low lung volumes. Mild right perihilar  opacities decreased from comparison exam. The left costophrenic angle  is collimated beyond the field-of-view. No large left pleural  effusion. No right pleural effusion. No pneumothorax. The upper  abdomen is unremarkable. Stable bilateral cervical subcutaneous  emphysema. Elevation of the right hemidiaphragm.      Impression    IMPRESSION:  1. Stable support devices.  2. Stable bilateral cervical subcutaneous emphysema.  3. Mild right perihilar opacities, favored to represent atelectasis in  the setting of low lung volume.    I have personally reviewed the examination and initial interpretation  and I agree with the findings.    NATIVIDAD CHAMBERLAIN MD   Echocardiogram Complete    Narrative    [id number]  [id number]  [id number]  [id number]^GRIS^YOSHI           [facility],[place]  Echocardiography Laboratory  [address] [place] [id number]     Name: FAINA FORRESTER  MRN: [id number]  : 1980  Study Date: 2020 08:16 AM  Age: 39 yrs  Gender: Male  Patient Location: [facility]  Reason For Study: Cardiac Arrest  Ordering Physician: YOSHI LANDRY  Performed By: Denisse Johnson RDCS     BSA: 2.1 m2  Height: 66 in  Weight: 231 lb  BP: 132/58 mmHg  _____________________________________________________________________________  __        Procedure  Complete Portable  Echo Adult. Technically difficult study.Extremely poor  acoustic windows.  _____________________________________________________________________________  __        Interpretation Summary  VA ECMO at 3.7L/min. Technically difficult study. Extremely poor acoustic  windows.  Severely (EF <30%) reduced left ventricular function on extremely limited  views.  The right ventricle cannot be assessed.  No pericardial effusion is present.  Previous study not available for comparison.  _____________________________________________________________________________  __        Left Ventricle  Left ventricular wall thickness cannot evaluate. Left ventricular size is  normal. Severely (EF <30%) reduced left ventricular function is present. Left  ventricular diastolic function is not assessable. Severe diffuse hypokinesis  is present.     Right Ventricle  The right ventricle cannot be assessed. Right ventricular function cannot be  assessed due to poor image quality.     Mitral Valve  The mitral valve cannot be assessed.        Aortic Valve  The aortic valve opens with each cardiac cycle. On Doppler interrogation,  there is no significant stenosis or regurgitation.     Tricuspid Valve  The tricuspid valve cannot be assessed. Pulmonary artery systolic pressure  cannot be assessed.     Pulmonic Valve  The pulmonic valve cannot be assessed.     Vessels  The aorta root cannot be assessed. The thoracic aorta cannot be assessed.  Venous ECMO cannula is visualized traversing the IVC.     Pericardium  No pericardial effusion is present.     Compared to Previous Study  Previous study not available for comparison.     _____________________________________________________________________________  __                       Report approved by: Ashlee Izquierdo 05/18/2020 09:18 AM                 _____________________________________________________________________________  __      Echocardiogram Limited    Narrative     [id number]  [id number]  [id number]  [id number]^EARLE^CAITLYN^M           [facility],[place]  Echocardiography Laboratory  [address] [place] [address]     Name: FAINA FORRESTER  MRN: [id number]  : 1980  Study Date: 2020 10:37 AM  Age: 39 yrs  Gender: Male  Patient Location: [facility]  Reason For Study: Cardiac Arrest  Ordering Physician: CAITLYN OG  Performed By: ABDIRAHMAN Benitez     BSA: 2.1 m2  Height: 66 in  Weight: 232 lb  HR: 123  BP: 130/55 mmHg  _____________________________________________________________________________  __        Procedure  Limited Portable Echo Adult.  _____________________________________________________________________________  __        Interpretation Summary  Limited turndown study. Patient on VA ECMO and IABP support.     Baseline (4 LPM): Small LV size with mildly reduced global LV function, LVEF=-  45-50%.  Grossly normal RV function.  The ventricular septum is midline.  The aortic valve opens with every beat.  No significant tricuspid and mitral regurgitation.  No pericardial effusion.     There is significant improvement in biventricular function with sequential  decrease in flow rate to 1LPM and with IABP off. Overall, significant  improvement in LV function compared to 20.  _____________________________________________________________________________  __     _____________________________________________________________________________  __     MMode/2D Measurements & Calculations  IVSd: 1.3 cm  LVIDd: 3.9 cm  LVIDs: 2.8 cm  LVPWd: 1.3 cm  FS: 29.1 %  LV mass(C)d: 182.2 grams  LV mass(C)dI: 85.5 grams/m2     EF(MOD-bp): 59.3 %  RWT: 0.68           _____________________________________________________________________________  __           Report approved by: Ashlee Lacy 2020 11:28 AM      Echo Complete    Narrative    [id number]  [id number]  [id number]  [id number]^EARLE^CAITLYN^ZULEMA           [facility]  [place]  Echocardiography Laboratory  [address] [place] [address]     Name: FAINA FORRESTER  MRN: [id number]  : 1980  Study Date: 2020 10:05 AM  Age: 39 yrs  Gender: Male  Patient Location: [facility]  Reason For Study: Cardiac Arrest  Ordering Physician: CAITLYN OG  Referring Physician: CAITLYN GO  Performed By: Wally Sherman     BSA: 2.3 m2  Height: 66 in  Weight: 273 lb  HR: 128  BP: 109/53 mmHg  _____________________________________________________________________________  __        Procedure  Echocardiogram with two-dimensional, color and spectral Doppler performed.  _____________________________________________________________________________  __        Interpretation Summary     Sinus tachycardia at 130BPM.  The Ejection Fraction is estimated at 45-50%.  Akinetic LV apical segments.  Right ventricular function, chamber size, wall motion, and thickness are  normal.  Dilation of the inferior vena cava is present with abnormal respiratory  variation in diameter.  No pericardial effusion is present.  _____________________________________________________________________________  __        Left Ventricle  Left ventricular size is normal. Sinus tachycardia at 130BPM. Left ventricular  wall thickness is normal. The Ejection Fraction is estimated at 45-50%.  Diastolic function not assessed due to tachycardia. Akinetic LV apical  segments.     Right Ventricle  Right ventricular function, chamber size, wall motion, and thickness are  normal.     Atria  Both atria appear normal.     Mitral Valve  The mitral valve is normal.        Aortic Valve  Aortic valve is normal in structure and function.     Tricuspid Valve  The tricuspid valve cannot be assessed. Trace to mild tricuspid insufficiency  is present. Pulmonary artery systolic pressure cannot be assessed.     Pulmonic Valve  The pulmonic valve is normal.     Vessels  The thoracic aorta is normal. The pulmonary artery and  bifurcation cannot be  assessed. Dilation of the inferior vena cava is present with abnormal  respiratory variation in diameter.     Pericardium  No pericardial effusion is present.     _____________________________________________________________________________  __  MMode/2D Measurements & Calculations  IVSd: 0.96 cm  LVIDd: 5.1 cm  LVIDs: 3.0 cm  LVPWd: 0.85 cm  FS: 39.8 %  LV mass(C)d: 162.2 grams  LV mass(C)dI: 71.0 grams/m2     Ao root diam: 3.0 cm  asc Aorta Diam: 2.5 cm  LVOT diam: 2.0 cm  LVOT area: 3.1 cm2  LA Volume (BP): 72.7 ml  LA Volume Index (BP): 31.9 ml/m2  RWT: 0.34        Doppler Measurements & Calculations  MV E max sachin: 96.1 cm/sec  MV A max sachin: 62.4 cm/sec  MV E/A: 1.5  Ao V2 max: 152.0 cm/sec  Ao max P.0 mmHg  ALESSANDRO(V,D): 2.4 cm2     LV V1 max P.5 mmHg  LV V1 max: 117.0 cm/sec  LV V1 VTI: 17.5 cm  SV(LVOT): 55.0 ml  SI(LVOT): 24.1 ml/m2  PA V2 max: 168.0 cm/sec  PA max P.3 mmHg  PA acc time: 0.10 sec  AV Sachin Ratio (DI): 0.77  Lateral E/e': 11.5     _____________________________________________________________________________  __           Report approved by: Ashlee Henson 2020 11:08 AM      Echo Limited    Narrative    [id number]  [id number]  [id number]  [id number]^EARLE^CAITLYN^ZULEMA           [facility],[place]  Echocardiography Laboratory  [address] [place] [id number]     Name: FAINA FORRESTER  MRN: [id number]  : 1980  Study Date: 2020 01:22 PM  Age: 39 yrs  Gender: Male  Patient Location: [facility]  Reason For Study: Cardiac Arrest  Ordering Physician: CAITLYN OG  Performed By: Denisse Johnson RDCS     BSA: 2.0 m2  Height: 66 in  Weight: 208 lb  HR: 125  BP: 128/85 mmHg  _____________________________________________________________________________  __        Procedure  Limited Portable Echo Adult. Contrast Optison. Technically difficult study.  Optison (NDC #3994-8401-52) given intravenously. Patient was given 5 ml  mixture of  3 ml Optison and 6 ml saline. 4 ml wasted.  _____________________________________________________________________________  __        Interpretation Summary  Limited study to evaluate biventricular function.     Left ventricular size is normal. The Ejection Fraction is estimated at 35-40%.  The mid to distal anterolateral wall, the apex and distal septum is akinetic  consistent with LAD territory injury.  Right ventricular function, chamber size, wall motion, and thickness are  normal.  No pericardial effusion is present.     Compared to prior study EF appears grossly similar.  _____________________________________________________________________________  __        Left Ventricle  Left ventricular size is normal. Left ventricular wall thickness is normal.  The Ejection Fraction is estimated at 35-40%. The mid to distal anterolateral  wall, the apex and distal septum is akinetic consistent with LAD territory  injury.     Right Ventricle  Right ventricular function, chamber size, wall motion, and thickness are  normal.     Atria  The atria cannot be assessed.     Mitral Valve  The mitral valve is normal.        Aortic Valve  The aortic valve cannot be assessed.     Tricuspid Valve  The tricuspid valve cannot be assessed.     Pulmonic Valve  The pulmonic valve cannot be assessed.     Vessels  The inferior vena cava cannot be assessed.     Pericardium  No pericardial effusion is present.        Compared to Previous Study  Compared to prior study EF appears grossly similar.  _____________________________________________________________________________  __                          Report approved by: Ashlee MILLS 05/26/2020 02:19 PM              _____________________________________________________________________________  __      Echo Limited    Narrative    [id number]  [id number]  [id number]  [id number]^EARLE^CAITLYN^M           [facility],[place]  Echocardiography Laboratory  [address] [place] [address]  [place] [address]     Name: FAINA FORRESTER  MRN: [id number]  : 1980  Study Date: 06/10/2020 02:08 PM  Age: 39 yrs  Gender: Male  Patient Location: [facility] [place]  Reason For Study: CAD  Ordering Physician: CAITLYN OG  Performed By: Denisse Johnson RDCS     BSA: 2.0 m2  Height: 66 in  Weight: 207 lb  HR: 103  BP: 107/76 mmHg  _____________________________________________________________________________  __        Procedure  Limited Portable Echo Adult. Contrast Optison. Optison (NDC #4393-4243-50)  given intravenously. Patient was given 5 ml mixture of 3 ml Optison and 6 ml  saline. 4 ml wasted.  _____________________________________________________________________________  __        Interpretation Summary  LVEF 36% based on biplane 2D tracing.  LAD territory akinesis.  Right ventricular function, chamber size, wall motion, and thickness are  normal.  The inferior vena cava is normal.  No pericardial effusion is present.  There has been no change.  _____________________________________________________________________________  __        Left Ventricle  LVEF 36% based on biplane 2D tracing. Left ventricular wall thickness is  normal. LAD territory akinesis.     Right Ventricle  Right ventricular function, chamber size, wall motion, and thickness are  normal.     Vessels  The inferior vena cava is normal.     Pericardium  No pericardial effusion is present.        Compared to Previous Study  There has been no change.  _____________________________________________________________________________  __     MMode/2D Measurements & Calculations     EF(MOD-bp): 36.1 %  TAPSE: 2.6 cm        Doppler Measurements & Calculations  LV V1 max PG: 3.3 mmHg  LV V1 max: 90.8 cm/sec  LV V1 VTI: 14.6 cm     _____________________________________________________________________________  __           Report approved by: Ashlee Henson 06/10/2020 03:01 PM      Cardiac Catheterization    Narrative      Acute anterolateral  myocardial infarction, with out of hospital cardiac   arrest, ROSC, and recurrent arrest on arrival to cath lab    Single vessel CAD with thrombotic occlusion of proximal LAD    Successful aspiration thrombectomy of the proximal LAD    Successful stenting of proximal-mid LAD with NAZIA (3.0x20 Synergy),   post-dilation to 4.0 mm guided by IVUS.    Successful POBA (with final kissing balloon) of ostial D1    Cardiac resuscitation with inotropic therapy    CPR    Successful placement of periperhal V-A ECMO with Cardiohelp (RFA, RFV)    Successful placement of [employer] Quattro cooling catheter for Thermoguard   hypothermia    Successful placement of RT radial arterial line    IVUS was performed on the proximal/mid LAD post stenting    Successful insertion of distal antegrade Flex sheath into RT SFA

## 2020-06-16 NOTE — PROGRESS NOTES
Cardiology Progress Note  Scot Bishop MRN: [id number]  Age: 39 year old, : 1980  Date: 2020            Assessment and Plan:     Scot Bishop is a 39 year old male who was admitted on 2020 for cardiac arrest. Pt reportedly had chest pain that started on 20. He was using Drano on his pipes at home and did not initially seek medical attention for CP and SOB since it said on the box that Drano can cause those symptoms. He took a shower and had syncopal episode after coming out of the shower. EMS was called; initial rhythm asystole. With chest compressions pt eventually had PEA and then eventually had VF in the field. After multiple cycles of epinephrine had ROSC. He was intubated in the field.   Pt was brought to [facility] ED where he regained a pulse and was brought to the Cath Lab for coronary angiogram. Initially, BP was 90s/60s as he was being transported from ED but he had recurrent cardiac arrest on arrival to Cath Lab. ECG showed ST elevation in aVR. He was promptly placed on VA ECMO. He had thrombotic occlusion of proximal LAD and underwent successful aspiration thrombectomy and PCI w/ NAZIA of proximal and mid LAD.     Interval events: CT CAP without obvious source for recurrent emesis. IR consulted this morning for possible jejunal extension of PEG; unable to extend current PEG but can exchange for G-J after site has matured in ~4 weeks. Will need feeding via NJ until that time. Notified SICU re: extending crepitus on CT. Afebrile.     Today's plan:   -Dietician consult for NJ placement   -restart TF at [facility] today via NJ   -await SICU input re: crepitus   -continue zosyn   -defer transfer to [facility] until tolerated TF at goal w/o further emesis     Neurology: Hypoxic brain injury   Initial CT head negative. Cooled to 34 degrees on arrival; rewarmed  AM. EEG  showed severe diffuse encephalopathy without seizures or epileptiform discharges.   Repeat  CTH 5/25: multifocal acute/subacute cerebral infarcts   Brain MRI 5/26: diffuse acute/subacute infarcts in bilat cerebral hemispheres, corpus callosum, and periventricular white matter c/w evolving diffuse hypoxic ischemic brain injury   Episode of LUE posturing and rhythmic tremors overnight on 5/27. EEG restarted 5/28, now off.  CTH repeated 6/3 for lack of LLE movement - negative for new stroke or other acute changes.   -s/p trach placement 6/8  -seroquel 25 mg qAM and 50 mg qPM    -oxycodone d/c'd     -precedex if needed for agitation   -moving all extremities although nothing to command    Cardiovascular / Hemodynamics: Refractory VF arrest    S/p NAZIA to proximal-mid LAD  Sinus tachycardia   Peripheral VA ECMO inserted for cardiac arrest. LA 16 initially. Suspect ongoing tachycardia d/t evolving MI and post-arrest state. ECMO decannulation at bedside on 5/19; IABP discontinued 5/20. 23 second run on VT overnight on 5/27.   TTE 6/10/20: EF 36% w/ LAD territory akinesis, RV normal   EKG 6/15: sinus tachycardia, QTc 452.   -continue ASA 81mg daily and ticagrelor 90mg BID  -continue metoprolol tartrate 25 mg BID   -continue lisinopril 5 mg   -continue atorvastatin 10 mg   -continue lasix 20 mg daily    Pulmonary: Acute hypoxic respiratory failure  COVID negative  Admission CT chest showed bilateral consolidations c/w aspiration PNA. Had thick secretions that required bagging by RT on 5/20. Bronchoscopy 5/21. Increasing oxygen requirements and pt-vent dyssynchrony on 5/22. Pulmonary re-consulted, infectious work up, pt sedated. Significant pressure injury of tongue w/ splitting of tongue noted by [facility] nurse today. No active bleeding.    ABG 5/31: 7.48/34/116/26   CXR 6/15: stable cervical subcutaneous emphysema   Crepitus extending to mediastinum and pectoralis on CT CAP 6/15 - SICU notified.   -s/p trach placement 6/8   -tolerating trach dome   -mucomyst and albuterol nebs PRN   -growing GNR in sputum; unasyn d/c'd,  added vanc/zosyn 6/4 per ID  -vanc d/c'd 6/7 per ID  -discontinue zosyn per ID; restarted 6/14 for ongoing low grade temp, elevated WBC, non-lactose fermenting GNR in sputum     -CXR as needed   -ABGs PRN   -ENT consult 5/27 for pressure injury of tongue; plan for follow up w/ them as outpatient for possible surgical repair of tongue    GI and Nutrition: Shock liver  GIB, resolved  Emesis    Recurrent emesis. Abd xray and ultrasound unremarkable. Loose stools which are not new.  -> 84, AST 52 -> 45. T bili 0.5. Lipase 407. PEG placement in IR 6/9.  -lactate 1.5   -no obvious source for recurrent emesis on CT CAP   -start reglan BID and scheduled zofran   -TF on hold; restart at [facility] today after NJ placement   -possible PEG tube exchange for G-J by IR in 4 weeks  -ongoing loose stools  -C diff culture 6/1 and 6/14 negative    -GI Prophylaxis: Protonix BID for UGIB   Renal, Fluid and Electrolytes: Acute kidney injury, resolved    Cr 1.14, BUN 28. Urine unmeasured d/t diaper. Na 142 today.   -continue FWF 30 mL q4hrs   -monitor I/O  -maintain K>3.8 and Mg>2    Infectious Disease: Aspiration pneumonia  Leukocytosis  WBC 11.7, tmax 97.7F. Grew GNR, acinetobacter (not baumannii) in sputum cx. Blood cx 6/1 grew GPCs, sputum cx 6/2 grew non-lactose fermenting GNR. ID consulted 6/3 for persistent fevers. Re-cultured on 6/13 for low grade temp and elevated WBC; blood cx negative, sputum cx grew acinetobacter (not baumannii). Repeat C diff and UA negative. CXR unchaned. Currently afebrile.   -vancomycin/zosyn x5 days (5/17-5/22) for asp PNA   -discontinued meropenem, changed to unasyn 5/29  -unasyn discontinued and added vanc/zosyn 6/4  -discontinued vanc 6/7, zosyn 6/10  -restarted zosyn 6/14 for ongoing low grade temp, elevated WBC, acinetobacter in sputum   -will empirically treat w/ zosyn x 7 days per ID    -6/11 added keflex for infection prophylaxis of ECMO decann site (d/t oozing); discontinued when zosyn  "restarted  -CT CAP negative for other source of infection   -scheduled acetaminophen changed to PRN   -C diff culture 6/1 and 6/14 negative      -monitor for signs of infection given lines and leukocytosis   Hematology and Oncology: Thrombocytosis, resolved    Plts 311 today. Receiving ASA/ticagrelor for NAZIA. Transfused 1 unit PRBCs 5/21. Hgb 9.8 today. No signs of active bleeding.    -right inguinal hematoma on US at old ECMO cannulae insertion site; ongoing serosanguinous oozing from lateral aspect of site  -hematoma improved on 6/15 CT CAP    -peripheral smear showed normal platelet morphology   -daily CBC   -transfuse for Hgb<7   -DVT PPX: subcutaneous heparin    Endocrinology: No known medical history. BG elevated.  -not requiring insulin gtt  -HgbA1c 5.2   Lines:         R PICC line May 20, 2020 Restraint: not needed    Current lines are required for patient management       Family update by me today: Yes     Code Status: Full code     The pt was discussed and evaluated with Dr. Alanis, attending physician, who agrees with the assessment and plan above.     Kala Chiang, DNP APRN CNP          Objective     BP 98/64   Pulse 118   Temp 97.7  F (36.5  C) (Axillary)   Resp 24   Ht 1.676 m (5' 6\")   Wt 84.6 kg (186 lb 8.2 oz)   SpO2 100%   BMI 30.10 kg/m    Temp:  [97.1  F (36.2  C)-97.7  F (36.5  C)] 97.7  F (36.5  C)  Heart Rate:  [] 109  Resp:  [11-27] 24  BP: ()/(44-71) 98/64  FiO2 (%):  [30 %] 30 %  SpO2:  [99 %-100 %] 100 %  Wt Readings from Last 2 Encounters:   06/16/20 84.6 kg (186 lb 8.2 oz)     I/O last 3 completed shifts:  In: 1939 [I.V.:644; NG/GT:475]  Out: 2050 [Urine:2000; Stool:50]    GENERAL APPEARANCE: NAD.  HEENT: No icterus, PERRL 3 mm, trach in place.  CARDIOVASCULAR: sinus tachycardia, normal S1 and S2, no S3 or S4 and no murmur, click or rub. Normal PMI. Pulses palpable.  RESP: Clear bilaterally. Trach dome. Palpable bilateral neck and left chest " crepitus.  GASTRO: Soft, bowel sounds hyperactive. PEG in place.   GENITOURINARY: Condom cath in place.  EXTREMITIES: Warm, no edema.    NEURO: Pupils equal and reactive, 3 mm. Moving extremities and opening eyes although not following commands. Withdraws to pain.    INTEGUMENTARY: No rashes. Line sites CDI. Bilaterally groin sites CDI; right groin ecchymotic, incision intact with area of firmness on medial aspect of incision, serosanguinous drainage from lateral aspect.  LINES/TUBES/DRAINS: PICC. Trach. PEG.           Data:     Vent Settings:  Resp: 24 SpO2: 100 % O2 Device: Trach dome      Arterial Blood Gas:   No lab results found in last 7 days.    Vitals:    06/14/20 0200 06/15/20 0000 06/16/20 0000   Weight: 85.5 kg (188 lb 7.9 oz) 85.4 kg (188 lb 4.4 oz) 84.6 kg (186 lb 8.2 oz)   I/O last 3 completed shifts:  In: 1939 [I.V.:644; NG/GT:475]  Out: 2050 [Urine:2000; Stool:50]  Recent Labs   Lab 06/16/20  0428 06/15/20  0409   * 142   POTASSIUM 3.4 3.3*   CHLORIDE 113* 107   CO2 26 28   ANIONGAP 6 7   GLC 92 132*   BUN 22 28   CR 1.01 1.14   TEN 9.5 9.4     No components found for: URINE   Recent Labs   Lab 06/12/20 0428 06/09/20  0720   AST 45 52*   ALT 84* 112*   BILITOTAL 0.5 0.5   ALBUMIN 3.0* 2.9*   PROTTOTAL 8.4 7.6   ALKPHOS 144 148     Temp: 97.7  F (36.5  C) Temp src: AxillaryTemp  Min: 97.1  F (36.2  C)  Max: 97.7  F (36.5  C)   Recent Labs   Lab 06/16/20  0428 06/15/20  0409 06/14/20  0414 06/13/20  0412 06/12/20  1437   WBC 11.7* 13.7* 15.3* 13.2* 15.2*   HGB 9.8* 10.4* 10.4* 10.4* 10.1*   HCT 32.7* 33.4* 34.0* 33.6* 33.1*   MCV 98 96 95 97 97   RDW 14.1 14.0 13.8 14.1 14.4    325 396 371 406     No lab results found in last 7 days.  Recent Labs   Lab 06/16/20  0428 06/15/20  0409 06/14/20  0414 06/13/20  0412 06/12/20  0428   GLC 92 132* 122* 102* 99       All imaging personally reviewed:  Recent Results (from the past 24 hour(s))   Echocardiogram Complete    Narrative     [id number]  [id number]  [id number]  [id number]^GRIS^YOSHI           [facility],[place]  Echocardiography Laboratory  [address] [place] [id number]     Name: FAINA FORRESTER  MRN: [id number]  : 1980  Study Date: 2020 08:16 AM  Age: 39 yrs  Gender: Male  Patient Location: [facility]  Reason For Study: Cardiac Arrest  Ordering Physician: YOSHI LANDRY  Performed By: Denisse Johnson RDCS     BSA: 2.1 m2  Height: 66 in  Weight: 231 lb  BP: 132/58 mmHg  _____________________________________________________________________________  __        Procedure  Complete Portable Echo Adult. Technically difficult study.Extremely poor  acoustic windows.  _____________________________________________________________________________  __        Interpretation Summary  VA ECMO at 3.7L/min. Technically difficult study. Extremely poor acoustic  windows.  Severely (EF <30%) reduced left ventricular function on extremely limited  views.  The right ventricle cannot be assessed.  No pericardial effusion is present.  Previous study not available for comparison.  _____________________________________________________________________________  __        Left Ventricle  Left ventricular wall thickness cannot evaluate. Left ventricular size is  normal. Severely (EF <30%) reduced left ventricular function is present. Left  ventricular diastolic function is not assessable. Severe diffuse hypokinesis  is present.     Right Ventricle  The right ventricle cannot be assessed. Right ventricular function cannot be  assessed due to poor image quality.     Mitral Valve  The mitral valve cannot be assessed.        Aortic Valve  The aortic valve opens with each cardiac cycle. On Doppler interrogation,  there is no significant stenosis or regurgitation.     Tricuspid Valve  The tricuspid valve cannot be assessed. Pulmonary artery systolic pressure  cannot be assessed.     Pulmonic Valve  The pulmonic valve cannot be assessed.      Vessels  The aorta root cannot be assessed. The thoracic aorta cannot be assessed.  Venous ECMO cannula is visualized traversing the IVC.     Pericardium  No pericardial effusion is present.     Compared to Previous Study  Previous study not available for comparison.     _____________________________________________________________________________  __                       Report approved by: Ashlee Izquierdo 05/18/2020 09:18 AM                 _____________________________________________________________________________  __                Medications     Current Facility-Administered Medications   Medication     0.9% sodium chloride BOLUS     0.9% sodium chloride BOLUS     acetaminophen (TYLENOL) tablet 650 mg     acetylcysteine (MUCOMYST) 10 % nebulizer solution 4 mL     artificial tears ophthalmic ointment     aspirin (ASA) chewable tablet 81 mg     atorvastatin (LIPITOR) tablet 10 mg     calcium chloride in  mL intermittent infusion 1 g     chlorhexidine (PERIDEX) 0.12 % solution 15 mL     dextrose 10% infusion     glucose gel 15-30 g    Or     dextrose 50 % injection 25-50 mL    Or     glucagon injection 1 mg     furosemide (LASIX) tablet 20 mg     heparin ANTICOAGULANT injection 5,000 Units     heparin lock flush 10 UNIT/ML injection 5-10 mL     heparin lock flush 10 UNIT/ML injection 5-10 mL     HOLD MEDICATION (one time)     ipratropium - albuterol 0.5 mg/2.5 mg/3 mL (DUONEB) neb solution 3 mL     levalbuterol (XOPENEX) neb solution 0.63 mg     levETIRAcetam (KEPPRA) solution 1,000 mg     lidocaine (LMX4) cream     lidocaine 1 % 0.1-1 mL     lisinopril (ZESTRIL) tablet 5 mg     magnesium sulfate 2 g in water intermittent infusion     magnesium sulfate 4 g in 100 mL sterile water (premade)     melatonin tablet 10 mg     metoprolol tartrate (LOPRESSOR) half-tab 25 mg     miconazole (MICATIN) 2 % powder     multivitamins w/minerals (CERTAVITE) liquid 15 mL     naloxone (NARCAN) injection 0.1-0.4  mg     ondansetron (ZOFRAN) injection 4 mg     pantoprazole (PROTONIX) 2 mg/mL suspension 40 mg     piperacillin-tazobactam (ZOSYN) 4.5 g vial to attach to  mL bag     potassium chloride (KLOR-CON) Packet 20-40 mEq     potassium chloride 10 mEq in 100 mL intermittent infusion with 10 mg lidocaine     potassium chloride 10 mEq in 100 mL sterile water intermittent infusion (premix)     potassium chloride 20 mEq in 50 mL intermittent infusion     potassium chloride ER (KLOR-CON M) CR tablet 20-40 mEq     prochlorperazine (COMPAZINE) injection 5 mg     QUEtiapine (SEROquel) tablet 25 mg     QUEtiapine (SEROquel) tablet 50 mg     senna-docusate (SENOKOT-S/PERICOLACE) 8.6-50 MG per tablet 1 tablet     simethicone (MYLICON) suspension 40 mg     sodium chloride (NEBUSAL) 3 % neb solution 3 mL     sodium chloride (PF) 0.9% PF flush 10-20 mL     sodium chloride (PF) 0.9% PF flush 3 mL     sodium chloride (PF) 0.9% PF flush 3 mL     sodium phosphate 10 mmol in D5W intermittent infusion     sodium phosphate 15 mmol in D5W intermittent infusion     sodium phosphate 20 mmol in D5W intermittent infusion     sodium phosphate 25 mmol in D5W intermittent infusion     ticagrelor (BRILINTA) tablet 90 mg     vitamin A-D & C drops (TRI-VI-SOL) drops 13 mL     zinc sulfate solution 220 mg       I have seen and examined the patient with the [facility] team. I agree with the assessment and plan of the note above.I have reviewed pertinent labs.     Jefferson Alanis MD  Interventional Cardiology  Pager: [phone]

## 2020-06-16 NOTE — CONSULTS
Interventional Radiology Consult Service Note    IR consulted for possible G to GJ conversion vs placement of j extension    This is a 39 year old male who was admitted on 5/17/2020 for cardiac arrest now s/p VA ECMO, successful aspiration thrombectomy and PCI w/ NAZIA of proximal and mid LAD, trach placement, and IR G tube placement 6/9 with IR. Reportedly, the pt has had a couple of episodes of vomiting thought to be related to intolerance of gastric feeding. IR was consulted for G tube conversion to GJ.    Case and imaging was reviewed with Dr. Lerner from IR. Given the G tube was placed on 6/9 and the t-tacs are no longer in place, we would defer manipulation until a tract has formed into the stomach (4-6 weeks). Manipulation prior to tract formations risks peritonitis and loss of access into the stomach. Jejunal extension placement is not possible through an 18F G tube. Would recommend NJ tube placement for feeding in the interim.     Recommendations were reviewed with TERRENCE Griffiths. IR order was placed for G to GJ conversion and IR scheduling was asked to coordinate about 4 weeks out from G tube placement. This appt will go out on pt's discharge instructions.    Sinai Pollock DNP, APRN  Interventional Radiology   Pager: [phone]

## 2020-06-16 NOTE — PLAN OF CARE
ICU End of Shift Summary. See flowsheets for vital signs and detailed assessment.    Changes this shift: Neuro's unchanged. Frequently restless overnight despite HS Melatonin and Seroquel. CPOT 5, PRN tylenol given. Afebrile. ST. Hemodynamically stable. Trach dome @ 30%. Subcutaneous emphysema unchanged. Intermittent nausea with 1 emesis despite PRN IV Zofran. TF's held per patient care order. Voided 550 mL's. No stools. K+ 3.4 replaced with 20 mEq.     Plan: Discharge to [facility] when medically stable.

## 2020-06-16 NOTE — PROCEDURES
Small Bowel Feeding Tube Placement Assessment  Reason for Feeding Tube Placement: request for ppFT. Pt had ppFT and was tolerating feeds up until he had a PEG placed and has since not tolerated gastric feeds with persistent emesis. Pt unable to have J extension until 6/21. Plan to have ppFT placed for feeds for now.   Cortrak Start Time: 12:45  Cortrak End Time: 1:05  Medicine Delivered During Procedure: none  Placement Successful:  Presume post-pyloric (pending AXR confirmation).  Procedure Complications: pt needed RN to help hold down hands.   Final Placement [place] at exit of nare: 90 cm  Face to Face time with patient: 20 min     Bridle Placement:   Reason for bridle placement: securement of PPFT  Medicine delivered during procedure: lubricating jelly   Procedure: Successful  Location of top of clip on FT: @ 91 cm marker   Condition of nose/skin at time of bridle placement: Unremarkable  Face to Face time with patient: < 5 minutes.      Deandra Troy, RD, MS, LD  SICU: [phone]

## 2020-06-17 LAB
ANION GAP SERPL CALCULATED.3IONS-SCNC: 5 MMOL/L (ref 3–14)
ANION GAP SERPL CALCULATED.3IONS-SCNC: 6 MMOL/L (ref 3–14)
BUN SERPL-MCNC: 21 MG/DL (ref 7–30)
BUN SERPL-MCNC: 22 MG/DL (ref 7–30)
CALCIUM SERPL-MCNC: 9.5 MG/DL (ref 8.5–10.1)
CALCIUM SERPL-MCNC: 9.6 MG/DL (ref 8.5–10.1)
CHLORIDE SERPL-SCNC: 112 MMOL/L (ref 94–109)
CHLORIDE SERPL-SCNC: 114 MMOL/L (ref 94–109)
CO2 SERPL-SCNC: 29 MMOL/L (ref 20–32)
CO2 SERPL-SCNC: 30 MMOL/L (ref 20–32)
CREAT SERPL-MCNC: 1.08 MG/DL (ref 0.66–1.25)
CREAT SERPL-MCNC: 1.11 MG/DL (ref 0.66–1.25)
ERYTHROCYTE [DISTWIDTH] IN BLOOD BY AUTOMATED COUNT: 14 % (ref 10–15)
GFR SERPL CREATININE-BSD FRML MDRD: 82 ML/MIN/{1.73_M2}
GFR SERPL CREATININE-BSD FRML MDRD: 85 ML/MIN/{1.73_M2}
GLUCOSE SERPL-MCNC: 93 MG/DL (ref 70–99)
GLUCOSE SERPL-MCNC: 99 MG/DL (ref 70–99)
HCT VFR BLD AUTO: 33.8 % (ref 40–53)
HGB BLD-MCNC: 10.3 G/DL (ref 13.3–17.7)
MAGNESIUM SERPL-MCNC: 2.3 MG/DL (ref 1.6–2.3)
MCH RBC QN AUTO: 29.5 PG (ref 26.5–33)
MCHC RBC AUTO-ENTMCNC: 30.5 G/DL (ref 31.5–36.5)
MCV RBC AUTO: 97 FL (ref 78–100)
PHOSPHATE SERPL-MCNC: 4.5 MG/DL (ref 2.5–4.5)
PLATELET # BLD AUTO: 302 10E9/L (ref 150–450)
POTASSIUM SERPL-SCNC: 3.4 MMOL/L (ref 3.4–5.3)
POTASSIUM SERPL-SCNC: 3.5 MMOL/L (ref 3.4–5.3)
RBC # BLD AUTO: 3.49 10E12/L (ref 4.4–5.9)
SODIUM SERPL-SCNC: 146 MMOL/L (ref 133–144)
SODIUM SERPL-SCNC: 149 MMOL/L (ref 133–144)
WBC # BLD AUTO: 10.4 10E9/L (ref 4–11)

## 2020-06-17 PROCEDURE — 80048 BASIC METABOLIC PNL TOTAL CA: CPT | Performed by: NURSE PRACTITIONER

## 2020-06-17 PROCEDURE — 12000004 ZZH R&B IMCU UMMC

## 2020-06-17 PROCEDURE — 40000275 ZZH STATISTIC RCP TIME EA 10 MIN

## 2020-06-17 PROCEDURE — 25000128 H RX IP 250 OP 636: Performed by: NURSE PRACTITIONER

## 2020-06-17 PROCEDURE — 25000128 H RX IP 250 OP 636: Performed by: STUDENT IN AN ORGANIZED HEALTH CARE EDUCATION/TRAINING PROGRAM

## 2020-06-17 PROCEDURE — 83735 ASSAY OF MAGNESIUM: CPT | Performed by: STUDENT IN AN ORGANIZED HEALTH CARE EDUCATION/TRAINING PROGRAM

## 2020-06-17 PROCEDURE — 25000132 ZZH RX MED GY IP 250 OP 250 PS 637: Performed by: NURSE PRACTITIONER

## 2020-06-17 PROCEDURE — 99233 SBSQ HOSP IP/OBS HIGH 50: CPT | Performed by: INTERNAL MEDICINE

## 2020-06-17 PROCEDURE — 99207 ZZC APP CREDIT; MD BILLING SHARED VISIT: CPT | Performed by: NURSE PRACTITIONER

## 2020-06-17 PROCEDURE — 25000132 ZZH RX MED GY IP 250 OP 250 PS 637: Performed by: STUDENT IN AN ORGANIZED HEALTH CARE EDUCATION/TRAINING PROGRAM

## 2020-06-17 PROCEDURE — 25000125 ZZHC RX 250: Performed by: NURSE PRACTITIONER

## 2020-06-17 PROCEDURE — 25000132 ZZH RX MED GY IP 250 OP 250 PS 637

## 2020-06-17 PROCEDURE — 99207 ZZC NO CHARGE LOS: CPT | Performed by: INTERNAL MEDICINE

## 2020-06-17 PROCEDURE — 85027 COMPLETE CBC AUTOMATED: CPT | Performed by: STUDENT IN AN ORGANIZED HEALTH CARE EDUCATION/TRAINING PROGRAM

## 2020-06-17 PROCEDURE — 80048 BASIC METABOLIC PNL TOTAL CA: CPT | Performed by: STUDENT IN AN ORGANIZED HEALTH CARE EDUCATION/TRAINING PROGRAM

## 2020-06-17 PROCEDURE — 84100 ASSAY OF PHOSPHORUS: CPT | Performed by: STUDENT IN AN ORGANIZED HEALTH CARE EDUCATION/TRAINING PROGRAM

## 2020-06-17 PROCEDURE — 25000132 ZZH RX MED GY IP 250 OP 250 PS 637: Performed by: INTERNAL MEDICINE

## 2020-06-17 PROCEDURE — 40000047 ZZH STATISTIC CTO2 CONT OXYGEN TECH TIME EA 90 MIN

## 2020-06-17 RX ORDER — METOCLOPRAMIDE HYDROCHLORIDE 5 MG/5ML
10 SOLUTION ORAL 2 TIMES DAILY
Status: DISCONTINUED | OUTPATIENT
Start: 2020-06-17 | End: 2020-06-21

## 2020-06-17 RX ORDER — ONDANSETRON 2 MG/ML
4 INJECTION INTRAMUSCULAR; INTRAVENOUS EVERY 8 HOURS
Status: DISCONTINUED | OUTPATIENT
Start: 2020-06-17 | End: 2020-06-18

## 2020-06-17 RX ADMIN — ONDANSETRON 4 MG: 2 INJECTION INTRAMUSCULAR; INTRAVENOUS at 07:54

## 2020-06-17 RX ADMIN — POTASSIUM CHLORIDE 20 MEQ: 1.5 POWDER, FOR SOLUTION ORAL at 06:01

## 2020-06-17 RX ADMIN — Medication 13 ML: at 20:39

## 2020-06-17 RX ADMIN — LEVETIRACETAM 1000 MG: 100 SOLUTION ORAL at 08:01

## 2020-06-17 RX ADMIN — LEVETIRACETAM 1000 MG: 100 SOLUTION ORAL at 20:39

## 2020-06-17 RX ADMIN — ASPIRIN 81 MG CHEWABLE TABLET 81 MG: 81 TABLET CHEWABLE at 08:22

## 2020-06-17 RX ADMIN — CHLORHEXIDINE GLUCONATE 0.12% ORAL RINSE 15 ML: 1.2 LIQUID ORAL at 20:39

## 2020-06-17 RX ADMIN — QUETIAPINE FUMARATE 25 MG: 25 TABLET ORAL at 08:22

## 2020-06-17 RX ADMIN — PIPERACILLIN AND TAZOBACTAM 4.5 G: 4; .5 INJECTION, POWDER, FOR SOLUTION INTRAVENOUS at 18:06

## 2020-06-17 RX ADMIN — Medication 25 MG: at 08:22

## 2020-06-17 RX ADMIN — FUROSEMIDE 20 MG: 20 TABLET ORAL at 08:21

## 2020-06-17 RX ADMIN — ATORVASTATIN CALCIUM 10 MG: 10 TABLET, FILM COATED ORAL at 20:39

## 2020-06-17 RX ADMIN — CHLORHEXIDINE GLUCONATE 0.12% ORAL RINSE 15 ML: 1.2 LIQUID ORAL at 08:06

## 2020-06-17 RX ADMIN — PIPERACILLIN AND TAZOBACTAM 4.5 G: 4; .5 INJECTION, POWDER, FOR SOLUTION INTRAVENOUS at 01:08

## 2020-06-17 RX ADMIN — SIMETHICONE 40 MG: 20 EMULSION ORAL at 15:31

## 2020-06-17 RX ADMIN — HEPARIN SODIUM 5000 UNITS: 5000 INJECTION, SOLUTION INTRAVENOUS; SUBCUTANEOUS at 07:57

## 2020-06-17 RX ADMIN — Medication 5 ML: at 20:40

## 2020-06-17 RX ADMIN — Medication 40 MG: at 20:39

## 2020-06-17 RX ADMIN — PROCHLORPERAZINE EDISYLATE 5 MG: 5 INJECTION INTRAMUSCULAR; INTRAVENOUS at 21:27

## 2020-06-17 RX ADMIN — PIPERACILLIN AND TAZOBACTAM 4.5 G: 4; .5 INJECTION, POWDER, FOR SOLUTION INTRAVENOUS at 06:08

## 2020-06-17 RX ADMIN — METOCLOPRAMIDE HYDROCHLORIDE 10 MG: 5 SOLUTION ORAL at 20:39

## 2020-06-17 RX ADMIN — Medication 40 MG: at 08:01

## 2020-06-17 RX ADMIN — TICAGRELOR 90 MG: 90 TABLET ORAL at 20:39

## 2020-06-17 RX ADMIN — MULTIVITAMIN 15 ML: LIQUID ORAL at 08:21

## 2020-06-17 RX ADMIN — SIMETHICONE 40 MG: 20 EMULSION ORAL at 08:05

## 2020-06-17 RX ADMIN — QUETIAPINE FUMARATE 50 MG: 50 TABLET ORAL at 22:33

## 2020-06-17 RX ADMIN — PIPERACILLIN AND TAZOBACTAM 4.5 G: 4; .5 INJECTION, POWDER, FOR SOLUTION INTRAVENOUS at 11:42

## 2020-06-17 RX ADMIN — MICONAZOLE NITRATE: 20 POWDER TOPICAL at 08:10

## 2020-06-17 RX ADMIN — CHLORHEXIDINE GLUCONATE 0.12% ORAL RINSE 15 ML: 1.2 LIQUID ORAL at 11:43

## 2020-06-17 RX ADMIN — LISINOPRIL 5 MG: 5 TABLET ORAL at 08:22

## 2020-06-17 RX ADMIN — METOCLOPRAMIDE HYDROCHLORIDE 10 MG: 5 SOLUTION ORAL at 08:32

## 2020-06-17 RX ADMIN — MELATONIN TAB 3 MG 10 MG: 3 TAB at 22:34

## 2020-06-17 RX ADMIN — Medication 220 MG: at 08:01

## 2020-06-17 RX ADMIN — Medication 13 ML: at 08:01

## 2020-06-17 RX ADMIN — HEPARIN SODIUM 5000 UNITS: 5000 INJECTION, SOLUTION INTRAVENOUS; SUBCUTANEOUS at 15:31

## 2020-06-17 RX ADMIN — CHLORHEXIDINE GLUCONATE 0.12% ORAL RINSE 15 ML: 1.2 LIQUID ORAL at 15:30

## 2020-06-17 RX ADMIN — Medication 25 MG: at 20:39

## 2020-06-17 RX ADMIN — ACETAMINOPHEN 650 MG: 325 TABLET, FILM COATED ORAL at 20:38

## 2020-06-17 RX ADMIN — ONDANSETRON 4 MG: 2 INJECTION INTRAMUSCULAR; INTRAVENOUS at 15:31

## 2020-06-17 RX ADMIN — POTASSIUM CHLORIDE 20 MEQ: 1.5 POWDER, FOR SOLUTION ORAL at 18:13

## 2020-06-17 RX ADMIN — TICAGRELOR 90 MG: 90 TABLET ORAL at 08:21

## 2020-06-17 RX ADMIN — HEPARIN SODIUM 5000 UNITS: 5000 INJECTION, SOLUTION INTRAVENOUS; SUBCUTANEOUS at 01:08

## 2020-06-17 RX ADMIN — Medication 5 ML: at 20:42

## 2020-06-17 ASSESSMENT — ACTIVITIES OF DAILY LIVING (ADL)
ADLS_ACUITY_SCORE: 24
ADLS_ACUITY_SCORE: 22
ADLS_ACUITY_SCORE: 24

## 2020-06-17 ASSESSMENT — MIFFLIN-ST. JEOR: SCORE: 1687.75

## 2020-06-17 NOTE — PLAN OF CARE
ICU End of Shift Summary. See flowsheets for vital signs and detailed assessment.    Changes this shift: Neuro's unchanged. Afebrile. ST. Trach dome 30%. Pt pulled out NJ at shift change; MD notified, hold TF's overnight. Voiding wnl's. No stools, need sample. K+ 3.5 replaced with 20 mEq.    Plan: LTACH when medically stable.

## 2020-06-17 NOTE — PROGRESS NOTES
Cardiology Progress Note  Scot Bishop MRN: [id number]  Age: 39 year old, : 1980  Date: 2020            Assessment and Plan:     Scot Bishop is a 39 year old male who was admitted on 2020 for cardiac arrest. Pt reportedly had chest pain that started on 20. He was using Drano on his pipes at home and did not initially seek medical attention for CP and SOB since it said on the box that Drano can cause those symptoms. He took a shower and had syncopal episode after coming out of the shower. EMS was called; initial rhythm asystole. With chest compressions pt eventually had PEA and then eventually had VF in the field. After multiple cycles of epinephrine had ROSC. He was intubated in the field.   Pt was brought to [facility] ED where he regained a pulse and was brought to the Cath Lab for coronary angiogram. Initially, BP was 90s/60s as he was being transported from ED but he had recurrent cardiac arrest on arrival to Cath Lab. ECG showed ST elevation in aVR. He was promptly placed on VA ECMO. He had thrombotic occlusion of proximal LAD and underwent successful aspiration thrombectomy and PCI w/ NAZIA of proximal and mid LAD.     Interval events: patient self-discontinued NJ overnight. Crepitus improved.     Today's plan:   -restart trickle feeds via PEG   -add scheduled zofran, reglan   -PEG conversion to G-J in IR on 20 if indicated at that time   -resume FWF at 30 mL/hr   -remove trach sutures   -transfer to floor   -transfer to [facility] when bed available     Neurology: Hypoxic brain injury   Initial CT head negative. Cooled to 34 degrees on arrival; rewarmed  AM. EEG  showed severe diffuse encephalopathy without seizures or epileptiform discharges.   Repeat CTH : multifocal acute/subacute cerebral infarcts   Brain MRI : diffuse acute/subacute infarcts in bilat cerebral hemispheres, corpus callosum, and periventricular white matter c/w evolving  diffuse hypoxic ischemic brain injury   Episode of LUE posturing and rhythmic tremors overnight on 5/27. EEG restarted 5/28, now off.  CTH repeated 6/3 for lack of LLE movement - negative for new stroke or other acute changes.   -s/p trach placement 6/8; will remove sutures today   -seroquel 25 mg qAM and 50 mg qPM    -oxycodone d/c'd     -precedex if needed for agitation   -moving all extremities although nothing to command    Cardiovascular / Hemodynamics: Refractory VF arrest    S/p NAZIA to proximal-mid LAD  Sinus tachycardia   Peripheral VA ECMO inserted for cardiac arrest. LA 16 initially. Suspect ongoing tachycardia d/t evolving MI and post-arrest state. ECMO decannulation at bedside on 5/19; IABP discontinued 5/20. 23 second run on VT overnight on 5/27.   TTE 6/10/20: EF 36% w/ LAD territory akinesis, RV normal   EKG 6/16: sinus tachycardia, QTc 452.   -continue ASA 81mg daily and ticagrelor 90mg BID  -continue metoprolol tartrate 25 mg BID   -continue lisinopril 5 mg   -continue atorvastatin 10 mg   -continue lasix 20 mg daily    Pulmonary: Acute hypoxic respiratory failure  COVID negative  Admission CT chest showed bilateral consolidations c/w aspiration PNA. Had thick secretions that required bagging by RT on 5/20. Bronchoscopy 5/21. Increasing oxygen requirements and pt-vent dyssynchrony on 5/22. Pulmonary re-consulted, infectious work up, pt sedated. Significant pressure injury of tongue w/ splitting of tongue noted by [facility] nurse today. No active bleeding.    ABG 5/31: 7.48/34/116/26   CXR 6/15: stable cervical subcutaneous emphysema   Crepitus extending to mediastinum and pectoralis on CT CAP 6/15 - SICU notified, NTD.   -s/p trach placement 6/8; remove sutures today    -tolerating trach dome   -mucomyst and albuterol nebs PRN   -growing GNR in sputum; unasyn d/c'd, added vanc/zosyn 6/4 per ID  -vanc d/c'd 6/7 per ID  -discontinue zosyn per ID; restarted 6/14 for ongoing low grade temp, elevated WBC,  non-lactose fermenting GNR in sputum     -CXR as needed   -ABGs PRN   -ENT consult 5/27 for pressure injury of tongue; plan for follow up w/ them as outpatient for possible surgical repair of tongue    GI and Nutrition: Shock liver  GIB, resolved  Emesis    Recurrent emesis. Abd xray and ultrasound unremarkable. Loose stools which are not new.  -> 84, AST 52 -> 45. T bili 0.5. Lipase 407. PEG placement in IR 6/9. Self-discontinued NJ overnight.   -lactate 1.5   -no obvious source for recurrent emesis on CT CAP   -start reglan BID and scheduled zofran   -restart trickle feeds and FWF today via PEG    -PEG tube exchange for G-J by IR on 7/21/20 if indicated at that time   -ongoing loose stools  -C diff culture 6/1 and 6/14 negative    -GI Prophylaxis: Protonix BID for UGIB   Renal, Fluid and Electrolytes: Acute kidney injury, resolved    Cr 1.11, BUN 22. Making adequate urine. Na 146 today.   -resume FWF 30 mL q4hrs; will recheck BMP at 1600    -monitor I/O  -maintain K>3.8 and Mg>2    Infectious Disease: Aspiration pneumonia  Leukocytosis  WBC 10.4, tmax 97.9F. Grew GNR, acinetobacter (not baumannii) in sputum cx. Blood cx 6/1 grew GPCs, sputum cx 6/2 grew non-lactose fermenting GNR. ID consulted 6/3 for persistent fevers. Re-cultured on 6/13 for low grade temp and elevated WBC; blood cx negative, sputum cx grew acinetobacter (not baumannii). Repeat C diff and UA negative. CXR unchaned. Currently afebrile.   -vancomycin/zosyn x5 days (5/17-5/22) for asp PNA   -discontinued meropenem, changed to unasyn 5/29  -unasyn discontinued and added vanc/zosyn 6/4  -discontinued vanc 6/7, zosyn 6/10  -restarted zosyn 6/14 for ongoing low grade temp, elevated WBC, acinetobacter in sputum   -will empirically treat w/ zosyn x 7 days per ID    -6/11 added keflex for infection prophylaxis of ECMO decann site (d/t oozing); discontinued when zosyn restarted  -CT CAP negative for other source of infection   -scheduled  "acetaminophen changed to PRN   -C diff culture 6/1 and 6/14 negative      -monitor for signs of infection given lines and leukocytosis   Hematology and Oncology: Thrombocytosis, resolved    Plts 302 today. Receiving ASA/ticagrelor for NAZIA. Transfused 1 unit PRBCs 5/21. Hgb 10.3 today. No signs of active bleeding.    -right inguinal hematoma on US at old ECMO cannulae insertion site; ongoing serosanguinous oozing from lateral aspect of site  -hematoma improved on 6/15 CT CAP    -peripheral smear showed normal platelet morphology   -daily CBC   -transfuse for Hgb<7   -DVT PPX: subcutaneous heparin    Endocrinology: No known medical history. BG elevated.  -not requiring insulin gtt  -HgbA1c 5.2   Lines:         R PICC line May 20, 2020 Restraint: not needed    Current lines are required for patient management       Family update by me today: Yes     Code Status: Full code     The pt was discussed and evaluated with Dr. Matamoros, attending physician, who agrees with the assessment and plan above.     Kala Chiang, DNP APRN CNP          Objective     BP 99/66   Pulse 118   Temp 97.9  F (36.6  C) (Axillary)   Resp 22   Ht 1.676 m (5' 6\")   Wt 83.5 kg (184 lb 1.4 oz)   SpO2 100%   BMI 29.71 kg/m    Temp:  [97.3  F (36.3  C)-99  F (37.2  C)] 97.9  F (36.6  C)  Heart Rate:  [] 107  Resp:  [10-37] 22  BP: ()/() 99/66  FiO2 (%):  [30 %] 30 %  SpO2:  [98 %-100 %] 100 %  Wt Readings from Last 2 Encounters:   06/17/20 83.5 kg (184 lb 1.4 oz)     I/O last 3 completed shifts:  In: 1245 [I.V.:670; NG/GT:425]  Out: 980 [Urine:905; Emesis/NG output:75]    GENERAL APPEARANCE: NAD. Moving all extremities. Makes eye contact intermittently.   HEENT: No icterus, PERRL 3 mm, trach in place.  CARDIOVASCULAR: sinus tachycardia, normal S1 and S2, no S3 or S4 and no murmur, click or rub. Normal PMI. Pulses palpable.  RESP: Clear bilaterally. Trach dome. Left neck crepitus improved.   GASTRO: Soft, bowel sounds " hyperactive. PEG in place.   GENITOURINARY: Condom cath in place.  EXTREMITIES: Warm, no edema.    NEURO: Pupils equal and reactive, 3 mm. Moving extremities and opening eyes although not following commands. Withdraws to pain. Making eye contact intermittently.    INTEGUMENTARY: No rashes. Line sites CDI. Bilaterally groin sites CDI; right groin ecchymotic, incision intact with area of firmness on medial aspect of incision, serosanguinous drainage from lateral aspect.  LINES/TUBES/DRAINS: PICC. Trach. PEG.           Data:     Vent Settings:  Resp: 22 SpO2: 100 % O2 Device: Trach dome      Arterial Blood Gas:   No lab results found in last 7 days.    Vitals:    06/15/20 0000 06/16/20 0000 06/17/20 0000   Weight: 85.4 kg (188 lb 4.4 oz) 84.6 kg (186 lb 8.2 oz) 83.5 kg (184 lb 1.4 oz)   I/O last 3 completed shifts:  In: 1245 [I.V.:670; NG/GT:425]  Out: 980 [Urine:905; Emesis/NG output:75]  Recent Labs   Lab 06/17/20  0331 06/16/20 0428   * 145*   POTASSIUM 3.5 3.4   CHLORIDE 112* 113*   CO2 30 26   ANIONGAP 5 6   GLC 93 92   BUN 22 22   CR 1.11 1.01   TEN 9.6 9.5     No components found for: URINE   Recent Labs   Lab 06/12/20 0428   AST 45   ALT 84*   BILITOTAL 0.5   ALBUMIN 3.0*   PROTTOTAL 8.4   ALKPHOS 144     Temp: 97.9  F (36.6  C) Temp src: AxillaryTemp  Min: 97.3  F (36.3  C)  Max: 99  F (37.2  C)   Recent Labs   Lab 06/17/20  0331 06/16/20  0428 06/15/20  0409 06/14/20  0414 06/13/20  0412   WBC 10.4 11.7* 13.7* 15.3* 13.2*   HGB 10.3* 9.8* 10.4* 10.4* 10.4*   HCT 33.8* 32.7* 33.4* 34.0* 33.6*   MCV 97 98 96 95 97   RDW 14.0 14.1 14.0 13.8 14.1    311 325 396 371     No lab results found in last 7 days.  Recent Labs   Lab 06/17/20  0331 06/16/20  0428 06/15/20  0409 06/14/20  0414 06/13/20  0412   GLC 93 92 132* 122* 102*       All imaging personally reviewed:  Recent Results (from the past 24 hour(s))   Echocardiogram Complete    Narrative    [id number]  [id number]  [id number]  [id number]^GRIS^YASHR            [facility],[place]  Echocardiography Laboratory  [address] [place] [address]     Name: FAINA FORRESTER  MRN: [id number]  : 1980  Study Date: 2020 08:16 AM  Age: 39 yrs  Gender: Male  Patient Location: [facility]  Reason For Study: Cardiac Arrest  Ordering Physician: YOSHI LANDRY  Performed By: Denisse Johnson RDCS     BSA: 2.1 m2  Height: 66 in  Weight: 231 lb  BP: 132/58 mmHg  _____________________________________________________________________________  __        Procedure  Complete Portable Echo Adult. Technically difficult study.Extremely poor  acoustic windows.  _____________________________________________________________________________  __        Interpretation Summary  VA ECMO at 3.7L/min. Technically difficult study. Extremely poor acoustic  windows.  Severely (EF <30%) reduced left ventricular function on extremely limited  views.  The right ventricle cannot be assessed.  No pericardial effusion is present.  Previous study not available for comparison.  _____________________________________________________________________________  __        Left Ventricle  Left ventricular wall thickness cannot evaluate. Left ventricular size is  normal. Severely (EF <30%) reduced left ventricular function is present. Left  ventricular diastolic function is not assessable. Severe diffuse hypokinesis  is present.     Right Ventricle  The right ventricle cannot be assessed. Right ventricular function cannot be  assessed due to poor image quality.     Mitral Valve  The mitral valve cannot be assessed.        Aortic Valve  The aortic valve opens with each cardiac cycle. On Doppler interrogation,  there is no significant stenosis or regurgitation.     Tricuspid Valve  The tricuspid valve cannot be assessed. Pulmonary artery systolic pressure  cannot be assessed.     Pulmonic Valve  The pulmonic valve cannot be assessed.     Vessels  The aorta root cannot be assessed. The  thoracic aorta cannot be assessed.  Venous ECMO cannula is visualized traversing the IVC.     Pericardium  No pericardial effusion is present.     Compared to Previous Study  Previous study not available for comparison.     _____________________________________________________________________________  __                       Report approved by: Ashlee Izquierdo 05/18/2020 09:18 AM                 _____________________________________________________________________________  __                Medications     Current Facility-Administered Medications   Medication     0.9% sodium chloride BOLUS     0.9% sodium chloride BOLUS     acetaminophen (TYLENOL) tablet 650 mg     acetylcysteine (MUCOMYST) 10 % nebulizer solution 4 mL     artificial tears ophthalmic ointment     aspirin (ASA) chewable tablet 81 mg     atorvastatin (LIPITOR) tablet 10 mg     calcium chloride in  mL intermittent infusion 1 g     chlorhexidine (PERIDEX) 0.12 % solution 15 mL     dextrose 10% infusion     glucose gel 15-30 g    Or     dextrose 50 % injection 25-50 mL    Or     glucagon injection 1 mg     furosemide (LASIX) tablet 20 mg     heparin ANTICOAGULANT injection 5,000 Units     heparin lock flush 10 UNIT/ML injection 5-10 mL     heparin lock flush 10 UNIT/ML injection 5-10 mL     HOLD MEDICATION (one time)     ipratropium - albuterol 0.5 mg/2.5 mg/3 mL (DUONEB) neb solution 3 mL     levalbuterol (XOPENEX) neb solution 0.63 mg     levETIRAcetam (KEPPRA) solution 1,000 mg     lidocaine (LMX4) cream     lidocaine 1 % 0.1-1 mL     lisinopril (ZESTRIL) tablet 5 mg     magnesium sulfate 2 g in water intermittent infusion     magnesium sulfate 4 g in 100 mL sterile water (premade)     melatonin tablet 10 mg     metoclopramide (REGLAN) solution 10 mg     metoprolol tartrate (LOPRESSOR) half-tab 25 mg     miconazole (MICATIN) 2 % powder     multivitamins w/minerals (CERTAVITE) liquid 15 mL     naloxone (NARCAN) injection 0.1-0.4 mg      ondansetron (ZOFRAN) injection 4 mg     pantoprazole (PROTONIX) 2 mg/mL suspension 40 mg     piperacillin-tazobactam (ZOSYN) 4.5 g vial to attach to  mL bag     potassium chloride (KLOR-CON) Packet 20-40 mEq     potassium chloride 10 mEq in 100 mL intermittent infusion with 10 mg lidocaine     potassium chloride 10 mEq in 100 mL sterile water intermittent infusion (premix)     potassium chloride 20 mEq in 50 mL intermittent infusion     potassium chloride ER (KLOR-CON M) CR tablet 20-40 mEq     prochlorperazine (COMPAZINE) injection 5 mg     QUEtiapine (SEROquel) tablet 25 mg     QUEtiapine (SEROquel) tablet 50 mg     senna-docusate (SENOKOT-S/PERICOLACE) 8.6-50 MG per tablet 1 tablet     simethicone (MYLICON) suspension 40 mg     sodium chloride (NEBUSAL) 3 % neb solution 3 mL     sodium chloride (PF) 0.9% PF flush 10-20 mL     sodium chloride (PF) 0.9% PF flush 3 mL     sodium chloride (PF) 0.9% PF flush 3 mL     sodium chloride 0.9% infusion     sodium phosphate 10 mmol in D5W intermittent infusion     sodium phosphate 15 mmol in D5W intermittent infusion     sodium phosphate 20 mmol in D5W intermittent infusion     sodium phosphate 25 mmol in D5W intermittent infusion     ticagrelor (BRILINTA) tablet 90 mg     vitamin A-D & C drops (TRI-VI-SOL) drops 13 mL     zinc sulfate solution 220 mg

## 2020-06-17 NOTE — PROGRESS NOTES
Care Coordinator - Discharge Planning    Admission Date/Time:  5/17/2020  Attending MD:  Scot Matamoros MD     Data  Date of initial CC assessment:  5/22/20  Chart reviewed, discussed with interdisciplinary team.   Patient was admitted for:   1. Coronary artery disease due to lipid rich plaque    2. ST elevation MI (STEMI) (H)    3. Cardiac arrest (H)    4. Cardiogenic shock (H)    5. Posturing episode         Assessment   Previous referral made to [facility] per family request. [facility] updated me today that pt had NJ for tube feeds placed yesterday but unfortunately, pt pulled this out overnight. Trickle feed through G-tube started with plan to give scheduled Zofran for nausea while increasing flow. Pt also has appt to convert G-tube to GJ tube on 7/21. I would like to inquire if [facility] would take pt with plan to increase tube feeds to goal rate and return pt for 7/21 appt.      Coordination of Care and Referrals: I have contacted [facility] today with the above request. [facility] responded that they would be able to take pt but unfortunately, there is not a bed available today. Pt has transfer orders to  in the meantime. [facility] to follow for LTACH transfer when a bed becomes available.       Plan  Anticipated Discharge Date:  1-2 days  Anticipated Discharge Plan:  Denis Barragan RN, BSN  ICU Care Coordinator  Pager: [phone]  Phone:  [phone]

## 2020-06-17 NOTE — PLAN OF CARE
ICU End of Shift Summary. See flowsheets for vital signs and detailed assessment.    Changes this shift: Nasojejunal feeding tube placed without incident and tube feeds were re-started. Remains intermittently restless and random with movement but has some purposeful movement in attempts to move discomforts away from self. He sometimes appears to look towards the person speaking to him and he closes his mouth to resist oral cares.     Plan: Promote nutrition. Continue with current plan of care.

## 2020-06-18 ENCOUNTER — APPOINTMENT (OUTPATIENT)
Dept: OCCUPATIONAL THERAPY | Facility: CLINIC | Age: 40
End: 2020-06-18
Payer: MEDICAID

## 2020-06-18 LAB
ALBUMIN SERPL-MCNC: 3 G/DL (ref 3.4–5)
ALP SERPL-CCNC: 119 U/L (ref 40–150)
ALT SERPL W P-5'-P-CCNC: 54 U/L (ref 0–70)
ANION GAP SERPL CALCULATED.3IONS-SCNC: 6 MMOL/L (ref 3–14)
ANION GAP SERPL CALCULATED.3IONS-SCNC: 6 MMOL/L (ref 3–14)
ANION GAP SERPL CALCULATED.3IONS-SCNC: 8 MMOL/L (ref 3–14)
AST SERPL W P-5'-P-CCNC: 31 U/L (ref 0–45)
BILIRUB DIRECT SERPL-MCNC: 0.1 MG/DL (ref 0–0.2)
BILIRUB SERPL-MCNC: 0.4 MG/DL (ref 0.2–1.3)
BUN SERPL-MCNC: 21 MG/DL (ref 7–30)
BUN SERPL-MCNC: 22 MG/DL (ref 7–30)
BUN SERPL-MCNC: 22 MG/DL (ref 7–30)
CALCIUM SERPL-MCNC: 9.6 MG/DL (ref 8.5–10.1)
CALCIUM SERPL-MCNC: 9.6 MG/DL (ref 8.5–10.1)
CALCIUM SERPL-MCNC: 9.7 MG/DL (ref 8.5–10.1)
CHLORIDE SERPL-SCNC: 115 MMOL/L (ref 94–109)
CHLORIDE SERPL-SCNC: 116 MMOL/L (ref 94–109)
CHLORIDE SERPL-SCNC: 117 MMOL/L (ref 94–109)
CO2 SERPL-SCNC: 26 MMOL/L (ref 20–32)
CO2 SERPL-SCNC: 27 MMOL/L (ref 20–32)
CO2 SERPL-SCNC: 28 MMOL/L (ref 20–32)
CREAT SERPL-MCNC: 1.15 MG/DL (ref 0.66–1.25)
CREAT SERPL-MCNC: 1.16 MG/DL (ref 0.66–1.25)
CREAT SERPL-MCNC: 1.24 MG/DL (ref 0.66–1.25)
ERYTHROCYTE [DISTWIDTH] IN BLOOD BY AUTOMATED COUNT: 14 % (ref 10–15)
GFR SERPL CREATININE-BSD FRML MDRD: 72 ML/MIN/{1.73_M2}
GFR SERPL CREATININE-BSD FRML MDRD: 78 ML/MIN/{1.73_M2}
GFR SERPL CREATININE-BSD FRML MDRD: 79 ML/MIN/{1.73_M2}
GLUCOSE SERPL-MCNC: 101 MG/DL (ref 70–99)
GLUCOSE SERPL-MCNC: 103 MG/DL (ref 70–99)
GLUCOSE SERPL-MCNC: 94 MG/DL (ref 70–99)
HCT VFR BLD AUTO: 33.6 % (ref 40–53)
HGB BLD-MCNC: 9.9 G/DL (ref 13.3–17.7)
INTERPRETATION ECG - MUSE: NORMAL
LIPASE SERPL-CCNC: 468 U/L (ref 73–393)
MAGNESIUM SERPL-MCNC: 2.1 MG/DL (ref 1.6–2.3)
MCH RBC QN AUTO: 29.4 PG (ref 26.5–33)
MCHC RBC AUTO-ENTMCNC: 29.5 G/DL (ref 31.5–36.5)
MCV RBC AUTO: 100 FL (ref 78–100)
PHOSPHATE SERPL-MCNC: 3.7 MG/DL (ref 2.5–4.5)
PLATELET # BLD AUTO: 281 10E9/L (ref 150–450)
POTASSIUM SERPL-SCNC: 3.4 MMOL/L (ref 3.4–5.3)
POTASSIUM SERPL-SCNC: 3.4 MMOL/L (ref 3.4–5.3)
POTASSIUM SERPL-SCNC: 3.5 MMOL/L (ref 3.4–5.3)
PROT SERPL-MCNC: 8.1 G/DL (ref 6.8–8.8)
RBC # BLD AUTO: 3.37 10E12/L (ref 4.4–5.9)
SODIUM SERPL-SCNC: 143 MMOL/L (ref 133–144)
SODIUM SERPL-SCNC: 148 MMOL/L (ref 133–144)
SODIUM SERPL-SCNC: 149 MMOL/L (ref 133–144)
SODIUM SERPL-SCNC: 150 MMOL/L (ref 133–144)
WBC # BLD AUTO: 8.7 10E9/L (ref 4–11)

## 2020-06-18 PROCEDURE — 36415 COLL VENOUS BLD VENIPUNCTURE: CPT | Performed by: INTERNAL MEDICINE

## 2020-06-18 PROCEDURE — 40000275 ZZH STATISTIC RCP TIME EA 10 MIN

## 2020-06-18 PROCEDURE — 80048 BASIC METABOLIC PNL TOTAL CA: CPT | Performed by: NURSE PRACTITIONER

## 2020-06-18 PROCEDURE — 25000132 ZZH RX MED GY IP 250 OP 250 PS 637: Performed by: STUDENT IN AN ORGANIZED HEALTH CARE EDUCATION/TRAINING PROGRAM

## 2020-06-18 PROCEDURE — 36592 COLLECT BLOOD FROM PICC: CPT | Performed by: NURSE PRACTITIONER

## 2020-06-18 PROCEDURE — 25000125 ZZHC RX 250: Performed by: NURSE PRACTITIONER

## 2020-06-18 PROCEDURE — 99233 SBSQ HOSP IP/OBS HIGH 50: CPT | Performed by: NURSE PRACTITIONER

## 2020-06-18 PROCEDURE — 93005 ELECTROCARDIOGRAM TRACING: CPT

## 2020-06-18 PROCEDURE — 25000128 H RX IP 250 OP 636: Performed by: NURSE PRACTITIONER

## 2020-06-18 PROCEDURE — 83735 ASSAY OF MAGNESIUM: CPT | Performed by: INTERNAL MEDICINE

## 2020-06-18 PROCEDURE — 36592 COLLECT BLOOD FROM PICC: CPT | Performed by: STUDENT IN AN ORGANIZED HEALTH CARE EDUCATION/TRAINING PROGRAM

## 2020-06-18 PROCEDURE — 27210437 ZZH NUTRITION PRODUCT SEMIELEM INTERMED LITER

## 2020-06-18 PROCEDURE — 25800030 ZZH RX IP 258 OP 636

## 2020-06-18 PROCEDURE — 84100 ASSAY OF PHOSPHORUS: CPT | Performed by: INTERNAL MEDICINE

## 2020-06-18 PROCEDURE — 25800030 ZZH RX IP 258 OP 636: Performed by: STUDENT IN AN ORGANIZED HEALTH CARE EDUCATION/TRAINING PROGRAM

## 2020-06-18 PROCEDURE — 12000004 ZZH R&B IMCU UMMC

## 2020-06-18 PROCEDURE — 83690 ASSAY OF LIPASE: CPT | Performed by: NURSE PRACTITIONER

## 2020-06-18 PROCEDURE — 25000132 ZZH RX MED GY IP 250 OP 250 PS 637

## 2020-06-18 PROCEDURE — 87040 BLOOD CULTURE FOR BACTERIA: CPT | Performed by: STUDENT IN AN ORGANIZED HEALTH CARE EDUCATION/TRAINING PROGRAM

## 2020-06-18 PROCEDURE — 84295 ASSAY OF SERUM SODIUM: CPT | Performed by: STUDENT IN AN ORGANIZED HEALTH CARE EDUCATION/TRAINING PROGRAM

## 2020-06-18 PROCEDURE — 25000132 ZZH RX MED GY IP 250 OP 250 PS 637: Performed by: NURSE PRACTITIONER

## 2020-06-18 PROCEDURE — 93010 ELECTROCARDIOGRAM REPORT: CPT | Performed by: INTERNAL MEDICINE

## 2020-06-18 PROCEDURE — 80076 HEPATIC FUNCTION PANEL: CPT | Performed by: NURSE PRACTITIONER

## 2020-06-18 PROCEDURE — 97535 SELF CARE MNGMENT TRAINING: CPT | Mod: GO | Performed by: OCCUPATIONAL THERAPIST

## 2020-06-18 PROCEDURE — 25000128 H RX IP 250 OP 636: Performed by: STUDENT IN AN ORGANIZED HEALTH CARE EDUCATION/TRAINING PROGRAM

## 2020-06-18 PROCEDURE — 25000132 ZZH RX MED GY IP 250 OP 250 PS 637: Performed by: INTERNAL MEDICINE

## 2020-06-18 PROCEDURE — 80048 BASIC METABOLIC PNL TOTAL CA: CPT | Performed by: INTERNAL MEDICINE

## 2020-06-18 PROCEDURE — 85027 COMPLETE CBC AUTOMATED: CPT | Performed by: INTERNAL MEDICINE

## 2020-06-18 RX ORDER — QUETIAPINE FUMARATE 50 MG/1
100 TABLET, FILM COATED ORAL AT BEDTIME
Status: DISCONTINUED | OUTPATIENT
Start: 2020-06-18 | End: 2020-06-25 | Stop reason: HOSPADM

## 2020-06-18 RX ORDER — DEXTROSE MONOHYDRATE 50 MG/ML
INJECTION, SOLUTION INTRAVENOUS
Status: COMPLETED
Start: 2020-06-18 | End: 2020-06-18

## 2020-06-18 RX ORDER — DEXTROSE MONOHYDRATE 50 MG/ML
INJECTION, SOLUTION INTRAVENOUS CONTINUOUS
Status: DISCONTINUED | OUTPATIENT
Start: 2020-06-18 | End: 2020-06-21

## 2020-06-18 RX ORDER — ONDANSETRON 2 MG/ML
4 INJECTION INTRAMUSCULAR; INTRAVENOUS EVERY 6 HOURS
Status: DISCONTINUED | OUTPATIENT
Start: 2020-06-18 | End: 2020-06-21

## 2020-06-18 RX ADMIN — MULTIVITAMIN 15 ML: LIQUID ORAL at 08:59

## 2020-06-18 RX ADMIN — Medication 220 MG: at 08:59

## 2020-06-18 RX ADMIN — METOCLOPRAMIDE HYDROCHLORIDE 10 MG: 5 SOLUTION ORAL at 08:20

## 2020-06-18 RX ADMIN — ONDANSETRON 4 MG: 2 INJECTION INTRAMUSCULAR; INTRAVENOUS at 20:02

## 2020-06-18 RX ADMIN — PIPERACILLIN AND TAZOBACTAM 4.5 G: 4; .5 INJECTION, POWDER, FOR SOLUTION INTRAVENOUS at 06:24

## 2020-06-18 RX ADMIN — HEPARIN SODIUM 5000 UNITS: 5000 INJECTION, SOLUTION INTRAVENOUS; SUBCUTANEOUS at 09:02

## 2020-06-18 RX ADMIN — ASPIRIN 81 MG CHEWABLE TABLET 81 MG: 81 TABLET CHEWABLE at 08:59

## 2020-06-18 RX ADMIN — Medication 5 ML: at 19:16

## 2020-06-18 RX ADMIN — POTASSIUM CHLORIDE 20 MEQ: 1.5 POWDER, FOR SOLUTION ORAL at 12:06

## 2020-06-18 RX ADMIN — DEXTROSE MONOHYDRATE: 50 INJECTION, SOLUTION INTRAVENOUS at 18:07

## 2020-06-18 RX ADMIN — CHLORHEXIDINE GLUCONATE 0.12% ORAL RINSE 15 ML: 1.2 LIQUID ORAL at 08:59

## 2020-06-18 RX ADMIN — Medication 13 ML: at 08:59

## 2020-06-18 RX ADMIN — DEXTROSE MONOHYDRATE 1000 ML: 50 INJECTION, SOLUTION INTRAVENOUS at 18:08

## 2020-06-18 RX ADMIN — Medication 25 MG: at 09:00

## 2020-06-18 RX ADMIN — METOCLOPRAMIDE HYDROCHLORIDE 10 MG: 5 SOLUTION ORAL at 19:16

## 2020-06-18 RX ADMIN — MICONAZOLE NITRATE: 20 POWDER TOPICAL at 19:16

## 2020-06-18 RX ADMIN — CHLORHEXIDINE GLUCONATE 0.12% ORAL RINSE 15 ML: 1.2 LIQUID ORAL at 12:06

## 2020-06-18 RX ADMIN — ONDANSETRON 4 MG: 2 INJECTION INTRAMUSCULAR; INTRAVENOUS at 08:20

## 2020-06-18 RX ADMIN — LEVETIRACETAM 1000 MG: 100 SOLUTION ORAL at 08:59

## 2020-06-18 RX ADMIN — PIPERACILLIN AND TAZOBACTAM 4.5 G: 4; .5 INJECTION, POWDER, FOR SOLUTION INTRAVENOUS at 00:37

## 2020-06-18 RX ADMIN — ONDANSETRON 4 MG: 2 INJECTION INTRAMUSCULAR; INTRAVENOUS at 14:54

## 2020-06-18 RX ADMIN — PIPERACILLIN AND TAZOBACTAM 4.5 G: 4; .5 INJECTION, POWDER, FOR SOLUTION INTRAVENOUS at 17:29

## 2020-06-18 RX ADMIN — TICAGRELOR 90 MG: 90 TABLET ORAL at 19:16

## 2020-06-18 RX ADMIN — LEVETIRACETAM 1000 MG: 100 SOLUTION ORAL at 19:16

## 2020-06-18 RX ADMIN — ALTEPLASE 1 MG: 2.2 INJECTION, POWDER, LYOPHILIZED, FOR SOLUTION INTRAVENOUS at 17:29

## 2020-06-18 RX ADMIN — MICONAZOLE NITRATE: 20 POWDER TOPICAL at 09:02

## 2020-06-18 RX ADMIN — QUETIAPINE FUMARATE 100 MG: 50 TABLET ORAL at 21:27

## 2020-06-18 RX ADMIN — QUETIAPINE FUMARATE 25 MG: 25 TABLET ORAL at 09:00

## 2020-06-18 RX ADMIN — CHLORHEXIDINE GLUCONATE 0.12% ORAL RINSE 15 ML: 1.2 LIQUID ORAL at 16:31

## 2020-06-18 RX ADMIN — MELATONIN TAB 3 MG 10 MG: 3 TAB at 21:27

## 2020-06-18 RX ADMIN — Medication 40 MG: at 08:59

## 2020-06-18 RX ADMIN — Medication 40 MG: at 19:16

## 2020-06-18 RX ADMIN — MICONAZOLE NITRATE: 20 POWDER TOPICAL at 00:38

## 2020-06-18 RX ADMIN — ACETAMINOPHEN 650 MG: 325 TABLET, FILM COATED ORAL at 16:31

## 2020-06-18 RX ADMIN — PIPERACILLIN AND TAZOBACTAM 4.5 G: 4; .5 INJECTION, POWDER, FOR SOLUTION INTRAVENOUS at 12:06

## 2020-06-18 RX ADMIN — TICAGRELOR 90 MG: 90 TABLET ORAL at 09:01

## 2020-06-18 RX ADMIN — HEPARIN SODIUM 5000 UNITS: 5000 INJECTION, SOLUTION INTRAVENOUS; SUBCUTANEOUS at 00:37

## 2020-06-18 RX ADMIN — HEPARIN SODIUM 5000 UNITS: 5000 INJECTION, SOLUTION INTRAVENOUS; SUBCUTANEOUS at 16:31

## 2020-06-18 RX ADMIN — ONDANSETRON 4 MG: 2 INJECTION INTRAMUSCULAR; INTRAVENOUS at 00:36

## 2020-06-18 ASSESSMENT — ACTIVITIES OF DAILY LIVING (ADL)
ADLS_ACUITY_SCORE: 22
ADLS_ACUITY_SCORE: 12
ADLS_ACUITY_SCORE: 18
ADLS_ACUITY_SCORE: 20
ADLS_ACUITY_SCORE: 12
ADLS_ACUITY_SCORE: 13

## 2020-06-18 ASSESSMENT — MIFFLIN-ST. JEOR: SCORE: 1663.75

## 2020-06-18 NOTE — PROGRESS NOTES
Cardiology Progress Note  Scot Bishop MRN: [id number]  Age: 39 year old, : 1980  Date: 2020            Assessment and Plan:     Scot Bishop is a 39 year old male who was admitted on 2020 for cardiac arrest. Pt reportedly had chest pain that started on 20. He was using Drano on his pipes at home and did not initially seek medical attention for CP and SOB since it said on the box that Drano can cause those symptoms. He took a shower and had syncopal episode after coming out of the shower. EMS was called; initial rhythm asystole. With chest compressions pt eventually had PEA and then eventually had VF in the field. After multiple cycles of epinephrine had ROSC. He was intubated in the field.   Pt was brought to [facility] ED where he regained a pulse and was brought to the Cath Lab for coronary angiogram. Initially, BP was 90s/60s as he was being transported from ED but he had recurrent cardiac arrest on arrival to Cath Lab. ECG showed ST elevation in aVR. He was promptly placed on VA ECMO. He had thrombotic occlusion of proximal LAD and underwent successful aspiration thrombectomy and PCI w/ NAZIA of proximal and mid LAD.     Interval events: episode of emesis last night. No sleep per nursing notes. Sodium level 148 this morning.     Today's plan:   -increase FWF to 100 mL q2hrs   -recheck Na at 1200 and 1600   -repeat EKG (QTc) today and daily     -increase PM seroquel to 100 mg; discontinue AM dose   -increase zofran frequency to q6hrs   -recheck lipase   -discontinue all non-essential medications given ongoing emesis   -increase TF as tolerated     Neurology: Hypoxic brain injury   Initial CT head negative. Cooled to 34 degrees on arrival; rewarmed  AM. EEG  showed severe diffuse encephalopathy without seizures or epileptiform discharges.   Repeat CTH : multifocal acute/subacute cerebral infarcts   Brain MRI : diffuse acute/subacute infarcts in  bilat cerebral hemispheres, corpus callosum, and periventricular white matter c/w evolving diffuse hypoxic ischemic brain injury   Episode of LUE posturing and rhythmic tremors overnight on 5/27. EEG restarted 5/28, now off.  CTH repeated 6/3 for lack of LLE movement - negative for new stroke or other acute changes.   -s/p trach placement 6/8; will remove sutures today   -seroquel 100 mg qPM     -moving all extremities although nothing to command  -eye contact and tracking at times     Cardiovascular / Hemodynamics: Refractory VF arrest    S/p NAZIA to proximal-mid LAD  Sinus tachycardia   Peripheral VA ECMO inserted for cardiac arrest. LA 16 initially. Suspect ongoing tachycardia d/t evolving MI and post-arrest state. ECMO decannulation at bedside on 5/19; IABP discontinued 5/20. 23 second run on VT overnight on 5/27.   TTE 6/10/20: EF 36% w/ LAD territory akinesis, RV normal   EKG 6/18: sinus tachycardia, QTc 450.   -continue ASA 81mg daily and ticagrelor 90mg BID  -discontinue metoprolol, lisinopril, atorvastatin, and lasix given ongoing emesis     Pulmonary: Acute hypoxic respiratory failure  COVID negative  Admission CT chest showed bilateral consolidations c/w aspiration PNA. Had thick secretions that required bagging by RT on 5/20. Bronchoscopy 5/21. Increasing oxygen requirements and pt-vent dyssynchrony on 5/22. Pulmonary re-consulted, infectious work up, pt sedated. Significant pressure injury of tongue w/ splitting of tongue noted by [facility] nurse today. No active bleeding.    ABG 5/31: 7.48/34/116/26   CXR 6/15: stable cervical subcutaneous emphysema   Crepitus extending to mediastinum and pectoralis on CT CAP 6/15 - SICU notified, NTD.   -s/p trach placement 6/8; remove sutures today    -tolerating trach dome   -mucomyst and albuterol nebs PRN   -growing GNR in sputum; unasyn d/c'd, added vanc/zosyn 6/4 per ID  -vanc d/c'd 6/7 per ID  -discontinue zosyn per ID; restarted 6/14 for ongoing low grade temp,  elevated WBC, non-lactose fermenting GNR in sputum     -CXR as needed   -ABGs PRN   -ENT consult 5/27 for pressure injury of tongue; plan for follow up w/ them as outpatient for possible surgical repair of tongue    GI and Nutrition: Shock liver  GIB, resolved  Emesis    Recurrent emesis. Abd xray and ultrasound unremarkable. Loose stools which are not new. ALT 54, AST 31. T bili 0.4. Lipase 468 today. PEG placement in IR 6/9. Self-discontinued NJ.   -no obvious source for recurrent emesis on CT CAP   -start reglan BID and scheduled zofran   -increase TF as tolerated     -PEG tube exchange for G-J by IR on 7/21/20 if indicated at that time   -ongoing loose stools  -C diff culture 6/1 and 6/14 negative    -GI Prophylaxis: Protonix BID for UGIB   Renal, Fluid and Electrolytes: Acute kidney injury, resolved    Cr 1.16, BUN 22. Making adequate urine. Na 149 today.   -increase FWF to 100 mL q2hrs; recheck Na at noon and 1600    -monitor I/O  -maintain K>3.8 and Mg>2    Infectious Disease: Aspiration pneumonia  Leukocytosis  WBC 8.7, tmax 98.3F. Grew GNR, acinetobacter (not baumannii) in sputum cx. Blood cx 6/1 grew GPCs, sputum cx 6/2 grew non-lactose fermenting GNR. ID consulted 6/3 for persistent fevers. Re-cultured on 6/13 for low grade temp and elevated WBC; blood cx negative, sputum cx grew acinetobacter (not baumannii). Repeat C diff and UA negative. CXR unchaned. Currently afebrile.   -vancomycin/zosyn x5 days (5/17-5/22) for asp PNA   -discontinued meropenem, changed to unasyn 5/29  -unasyn discontinued and added vanc/zosyn 6/4  -discontinued vanc 6/7, zosyn 6/10  -restarted zosyn 6/14 for ongoing low grade temp, elevated WBC, acinetobacter in sputum   -will empirically treat w/ zosyn x 7 days per ID    -CT CAP negative for other source of infection   -C diff culture 6/1 and 6/14 negative      -monitor for signs of infection given lines and leukocytosis   Hematology and Oncology: Thrombocytosis, resolved    Plts  "281 today. Receiving ASA/ticagrelor for NAZIA. Transfused 1 unit PRBCs 5/21. Hgb 9.9 today. No signs of active bleeding.    -right inguinal hematoma on US at old ECMO cannulae insertion site; ongoing serosanguinous oozing from lateral aspect of site  -hematoma improved on 6/15 CT CAP    -peripheral smear showed normal platelet morphology   -daily CBC   -transfuse for Hgb<7   -DVT PPX: subcutaneous heparin    Endocrinology: No known medical history. BG elevated.  -not requiring insulin gtt  -HgbA1c 5.2   Lines:         R PICC line May 20, 2020 Restraint: mitts    Current lines are required for patient management       Family update by me today: Yes     Code Status: Full code     The pt was discussed and evaluated with Dr. Matamoros, attending physician, who agrees with the assessment and plan above.     Kala Chiang, DNP APRN CNP          Objective     /56   Pulse 114   Temp 98.2  F (36.8  C) (Axillary)   Resp 16   Ht 1.676 m (5' 6\")   Wt 81.1 kg (178 lb 12.7 oz)   SpO2 100%   BMI 28.86 kg/m    Temp:  [97.6  F (36.4  C)-98.4  F (36.9  C)] 98.2  F (36.8  C)  Pulse:  [105-114] 114  Heart Rate:  [106-118] 113  Resp:  [10-29] 16  BP: ()/(56-86) 103/56  FiO2 (%):  [30 %] 30 %  SpO2:  [96 %-100 %] 100 %  Wt Readings from Last 2 Encounters:   06/18/20 81.1 kg (178 lb 12.7 oz)     I/O last 3 completed shifts:  In: 1490 [I.V.:670; NG/GT:720]  Out: 1000 [Urine:760; Emesis/NG output:240]    GENERAL APPEARANCE: NAD. Moving all extremities. Makes eye contact and tracks intermittently.   HEENT: No icterus, PERRL 3 mm, trach in place.  CARDIOVASCULAR: sinus tachycardia, normal S1 and S2, no S3 or S4 and no murmur, click or rub. Normal PMI. Pulses palpable.  RESP: Clear bilaterally. Trach dome. Left neck crepitus improved.   GASTRO: Soft, bowel sounds hyperactive. PEG in place.   GENITOURINARY: Condom cath in place.  EXTREMITIES: Warm, no edema.    NEURO: Pupils equal and reactive, 3 mm. Moving extremities and " opening eyes although not following commands. Withdraws to pain. Making eye contact and tracking intermittently.    INTEGUMENTARY: No rashes. Line sites CDI. Bilaterally groin sites CDI.  LINES/TUBES/DRAINS: PICC. Trach. PEG.           Data:     Vent Settings:  Resp: 16 SpO2: 100 % O2 Device: Trach dome Oxygen Delivery: Other (Comments)(20 LPM)    Arterial Blood Gas:   No lab results found in last 7 days.    Vitals:    20 0000 20 0000 20 0122   Weight: 84.6 kg (186 lb 8.2 oz) 83.5 kg (184 lb 1.4 oz) 81.1 kg (178 lb 12.7 oz)   I/O last 3 completed shifts:  In: 1490 [I.V.:670; NG/GT:720]  Out: 1000 [Urine:760; Emesis/NG output:240]  Recent Labs   Lab 20  1603 20  0331   * 146*   POTASSIUM 3.4 3.5   CHLORIDE 114* 112*   CO2 29 30   ANIONGAP 6 5   GLC 99 93   BUN 21 22   CR 1.08 1.11   TEN 9.5 9.6     No components found for: URINE   Recent Labs   Lab 20   AST 45   ALT 84*   BILITOTAL 0.5   ALBUMIN 3.0*   PROTTOTAL 8.4   ALKPHOS 144     Temp: 98.2  F (36.8  C) Temp src: AxillaryTemp  Min: 97.6  F (36.4  C)  Max: 98.4  F (36.9  C)   Recent Labs   Lab 20  0331 20  0428 06/15/20  0409 20  0414 20  0412   WBC 10.4 11.7* 13.7* 15.3* 13.2*   HGB 10.3* 9.8* 10.4* 10.4* 10.4*   HCT 33.8* 32.7* 33.4* 34.0* 33.6*   MCV 97 98 96 95 97   RDW 14.0 14.1 14.0 13.8 14.1    311 325 396 371     No lab results found in last 7 days.  Recent Labs   Lab 20  1603 20  0331 20  0428 06/15/20  0409 20  0414   GLC 99 93 92 132* 122*       All imaging personally reviewed:  Recent Results (from the past 24 hour(s))   Echocardiogram Complete    Narrative    [id number]  [id number]  [id number]  [id number]^GRIS^YOSHI           [facility]  Echocardiography Laboratory  [address] [place] [id number]     Name: FAINA FORRESTER  MRN: [id number]  : 1980  Study Date: 2020 08:16 AM  Age: 39 yrs  Gender:  Male  Patient Location: [facility]  Reason For Study: Cardiac Arrest  Ordering Physician: YOSHI LANDRY  Performed By: Denisse Johnson RDCS     BSA: 2.1 m2  Height: 66 in  Weight: 231 lb  BP: 132/58 mmHg  _____________________________________________________________________________  __        Procedure  Complete Portable Echo Adult. Technically difficult study.Extremely poor  acoustic windows.  _____________________________________________________________________________  __        Interpretation Summary  VA ECMO at 3.7L/min. Technically difficult study. Extremely poor acoustic  windows.  Severely (EF <30%) reduced left ventricular function on extremely limited  views.  The right ventricle cannot be assessed.  No pericardial effusion is present.  Previous study not available for comparison.  _____________________________________________________________________________  __        Left Ventricle  Left ventricular wall thickness cannot evaluate. Left ventricular size is  normal. Severely (EF <30%) reduced left ventricular function is present. Left  ventricular diastolic function is not assessable. Severe diffuse hypokinesis  is present.     Right Ventricle  The right ventricle cannot be assessed. Right ventricular function cannot be  assessed due to poor image quality.     Mitral Valve  The mitral valve cannot be assessed.        Aortic Valve  The aortic valve opens with each cardiac cycle. On Doppler interrogation,  there is no significant stenosis or regurgitation.     Tricuspid Valve  The tricuspid valve cannot be assessed. Pulmonary artery systolic pressure  cannot be assessed.     Pulmonic Valve  The pulmonic valve cannot be assessed.     Vessels  The aorta root cannot be assessed. The thoracic aorta cannot be assessed.  Venous ECMO cannula is visualized traversing the IVC.     Pericardium  No pericardial effusion is present.     Compared to Previous Study  Previous study not available for comparison.      _____________________________________________________________________________  __                       Report approved by: Ashlee Izquierdo 05/18/2020 09:18 AM                 _____________________________________________________________________________  __                Medications     Current Facility-Administered Medications   Medication     0.9% sodium chloride BOLUS     0.9% sodium chloride BOLUS     acetaminophen (TYLENOL) tablet 650 mg     acetylcysteine (MUCOMYST) 10 % nebulizer solution 4 mL     artificial tears ophthalmic ointment     aspirin (ASA) chewable tablet 81 mg     atorvastatin (LIPITOR) tablet 10 mg     calcium chloride in  mL intermittent infusion 1 g     chlorhexidine (PERIDEX) 0.12 % solution 15 mL     dextrose 10% infusion     glucose gel 15-30 g    Or     dextrose 50 % injection 25-50 mL    Or     glucagon injection 1 mg     furosemide (LASIX) tablet 20 mg     heparin ANTICOAGULANT injection 5,000 Units     heparin lock flush 10 UNIT/ML injection 5-10 mL     heparin lock flush 10 UNIT/ML injection 5-10 mL     HOLD MEDICATION (one time)     ipratropium - albuterol 0.5 mg/2.5 mg/3 mL (DUONEB) neb solution 3 mL     levalbuterol (XOPENEX) neb solution 0.63 mg     levETIRAcetam (KEPPRA) solution 1,000 mg     lidocaine (LMX4) cream     lidocaine 1 % 0.1-1 mL     lisinopril (ZESTRIL) tablet 5 mg     magnesium sulfate 2 g in water intermittent infusion     magnesium sulfate 4 g in 100 mL sterile water (premade)     melatonin tablet 10 mg     metoclopramide (REGLAN) solution 10 mg     metoclopramide (REGLAN) solution 10 mg     metoprolol tartrate (LOPRESSOR) half-tab 25 mg     miconazole (MICATIN) 2 % powder     multivitamins w/minerals (CERTAVITE) liquid 15 mL     naloxone (NARCAN) injection 0.1-0.4 mg     ondansetron (ZOFRAN) injection 4 mg     ondansetron (ZOFRAN) injection 4 mg     pantoprazole (PROTONIX) 2 mg/mL suspension 40 mg     piperacillin-tazobactam (ZOSYN) 4.5 g vial to  attach to  mL bag     potassium chloride (KLOR-CON) Packet 20-40 mEq     potassium chloride 10 mEq in 100 mL intermittent infusion with 10 mg lidocaine     potassium chloride 10 mEq in 100 mL sterile water intermittent infusion (premix)     potassium chloride 20 mEq in 50 mL intermittent infusion     potassium chloride ER (KLOR-CON M) CR tablet 20-40 mEq     prochlorperazine (COMPAZINE) injection 5 mg     QUEtiapine (SEROquel) tablet 25 mg     QUEtiapine (SEROquel) tablet 50 mg     senna-docusate (SENOKOT-S/PERICOLACE) 8.6-50 MG per tablet 1 tablet     simethicone (MYLICON) suspension 40 mg     sodium chloride (NEBUSAL) 3 % neb solution 3 mL     sodium chloride (PF) 0.9% PF flush 10-20 mL     sodium chloride (PF) 0.9% PF flush 3 mL     sodium chloride (PF) 0.9% PF flush 3 mL     sodium chloride 0.9% infusion     sodium phosphate 10 mmol in D5W intermittent infusion     sodium phosphate 15 mmol in D5W intermittent infusion     sodium phosphate 20 mmol in D5W intermittent infusion     sodium phosphate 25 mmol in D5W intermittent infusion     ticagrelor (BRILINTA) tablet 90 mg     vitamin A-D & C drops (TRI-VI-SOL) drops 13 mL     zinc sulfate solution 220 mg

## 2020-06-18 NOTE — PROVIDER NOTIFICATION
CSI notified of patients temp being 100.1 axillary. Blood cultures ordered and PRN tylenol given. Will cont to monitor and follow POC.

## 2020-06-18 NOTE — PROGRESS NOTES
CLINICAL NUTRITION SERVICES - BRIEF NOTE      Nutrition Prescription    Recommendations already ordered by Registered Dietitian (RD):  Impact Peptide @ goal 60 ml/hr (1440 ml/day) to provide 2160 kcals (31 kcal/kg/day), 135 g PRO (1.9 g/kg/day), 1109 ml free H2O, 92 g Fat (50% from MCTs), 202 g CHO and no Fiber daily    *Please see full assessment note from 6/16/20    New Findings:  Enteral Nutrition @ 20 mL/hr, increased from 10 mL this am.   --Goal rate of 60 mL/hr     Pt had episode of emesis last night.     Interventions  Collaboration with other providers- discussed with nurse increasing TF as tolerated    RD to follow per protocol.    Glory Garcia RD, LD  6B pager: [phone]

## 2020-06-18 NOTE — PLAN OF CARE
Discharge Planner OT   Patient plan for discharge: not stated  Current status: OT graded activiy to one step commands to increase particpation, pt unable to follow one step commands. Pt w/ increased aggitation w/ PROM, OT placed wash cloth in hand to grade and encurage participation, pt unable to particpate in ADLS/ functional tasks.   Barriers to return to prior living situation: medical status  Recommendations for discharge: LTACH   Rationale for recommendations: w/ continued OT       Entered by: Shilpa Velasquez 06/18/2020 9:44 AM

## 2020-06-18 NOTE — PLAN OF CARE
ICU End of Shift Summary. See flowsheets for vital signs and detailed assessment.    Changes this shift: Patient was noted to have focused on the iPad screen when his mother started speaking to him and he stayed still for 3-4 minutes staring at her face. He also has more purposeful movements now, pushing at specific discomforts. When spoken to, he turns toward the speaker at times.    Plan: Continue with current level of care.

## 2020-06-18 NOTE — PLAN OF CARE
Transferred to: (6B) at (1830)  Status at time of transfer: AVSS. Obtunded and moving all extremities.   Belongings: No personal belongings.  Silva removed? (if no, why?): NA  Chart and medications: Sent with patient.  Family notified: Yes, his father Pilo was called.

## 2020-06-18 NOTE — PLAN OF CARE
Neuro: Pt nonverbal at baseline. Not following commands. Not visually tracking.  Cardiac: -110s. VSS.   Respiratory: Sating 98% on 21% trach dome. Shiley 6. Optifoam in place. Suctioned x2 with blood tinged secretions.  GI/: Adequate urine output via condom cath. Incontinent BM X2.  Diet/appetite: NPO. TF increased to 20mL/hr around 1200. Will increase again this evening. Tolerating well.  Activity:  Assist of 2, frequently moves in bed independently. Limbs thrashing. Seizure pads and pillows in use.  Pain: No s/sx of pain / nausea.  Skin: WOC consult placed for skin breakdown on scrotum and buttocks area d/t moisture. Barrier cream in use.  LDA's: TF running via g-tube. K replaced.     Plan: Dad updated via phone and video call done with pt. Continue with POC. Notify primary team with changes.

## 2020-06-18 NOTE — SIGNIFICANT EVENT
.Transfer  Transferred from:  at 1905  Via:bed  Reason for transfer:Pt appropriate for 6B- improved patient condition  Family: Aware of transfer. Spoke with father, Gerald, at 2200  Belongings: Received with pt  Chart: Received with pt  Medications: Meds received from old unit at 2020t  Code Status verified on armband: yes/no  2 RN Skin Assessment Completed By: Demetrio Meraz, RN and Sadie Moulton RN. No new deficits noted.   Bed surface reassessed with algorithm and charted: yes  New bed surface ordered: no  Report received from: Sadie Moulton RN who received report from 4C RN.  Pt status: Stable.

## 2020-06-18 NOTE — PLAN OF CARE
Neuro: Thrashes in bed. Tries to rotate head towards left rail, feet towards right rail. Does not improve with Seroquel, acetaminophen. Rails padded with seizure pads. Does not track or follow commands. Non-verbal. Did not sleep. Mitts applied for line protection.   Cardiac: ST. VSS.   Respiratory: Sating adequately on 30% TD. Blood tinged sputum. HR up to 135 with suctioning. All suctioning charted on Vitals flow sheet.  GI/: Low urine output. Condom cath. Incontinent of stool. Had 1 emesis after evening meds. No change in respiratory status. Suctioned.  Diet/appetite: NPO. Enteral feedings.   Activity:  Moves himself in bed constantly, without purpose.   Pain: No physiological indicators. .   Skin: No new deficits noted.  LDA's: Tamara #6  G-tube  Condom cath  Plan: Continue with POC. Notify primary team with changes.

## 2020-06-18 NOTE — PROVIDER NOTIFICATION
Time of notification: 9:00 AM  Provider notified: CSI  Patient status: Confirming team is okay with giving metoprolol, BP 93/63 (71). When administering IV zofran earlier pt thrashing his arms around, pressed on syringe, and administered faster than 2 minutes. Per pharmacist monitor BPs 10-15 minutes after.   Temp:  [97.1  F (36.2  C)-98.4  F (36.9  C)] 97.3  F (36.3  C)  Pulse:  [103-114] 103  Heart Rate:  [105-117] 105  Resp:  [10-29] 18  BP: ()/(56-86) 107/75  FiO2 (%):  [30 %] 30 %  SpO2:  [96 %-100 %] 99 %  Orders received: Okay to give metoprolol. Will hold lisinopril.

## 2020-06-19 LAB
ANION GAP SERPL CALCULATED.3IONS-SCNC: 5 MMOL/L (ref 3–14)
BACTERIA SPEC CULT: NO GROWTH
BACTERIA SPEC CULT: NO GROWTH
BUN SERPL-MCNC: 21 MG/DL (ref 7–30)
CALCIUM SERPL-MCNC: 9.3 MG/DL (ref 8.5–10.1)
CHLORIDE SERPL-SCNC: 116 MMOL/L (ref 94–109)
CO2 SERPL-SCNC: 26 MMOL/L (ref 20–32)
CREAT SERPL-MCNC: 1.18 MG/DL (ref 0.66–1.25)
ERYTHROCYTE [DISTWIDTH] IN BLOOD BY AUTOMATED COUNT: 14 % (ref 10–15)
GFR SERPL CREATININE-BSD FRML MDRD: 76 ML/MIN/{1.73_M2}
GLUCOSE SERPL-MCNC: 115 MG/DL (ref 70–99)
HCT VFR BLD AUTO: 33.6 % (ref 40–53)
HGB BLD-MCNC: 10.1 G/DL (ref 13.3–17.7)
INTERPRETATION ECG - MUSE: NORMAL
LIPASE SERPL-CCNC: 495 U/L (ref 73–393)
MAGNESIUM SERPL-MCNC: 2.1 MG/DL (ref 1.6–2.3)
MCH RBC QN AUTO: 29.7 PG (ref 26.5–33)
MCHC RBC AUTO-ENTMCNC: 30.1 G/DL (ref 31.5–36.5)
MCV RBC AUTO: 99 FL (ref 78–100)
PHOSPHATE SERPL-MCNC: 2.8 MG/DL (ref 2.5–4.5)
PLATELET # BLD AUTO: 258 10E9/L (ref 150–450)
POTASSIUM SERPL-SCNC: 3.3 MMOL/L (ref 3.4–5.3)
POTASSIUM SERPL-SCNC: 3.6 MMOL/L (ref 3.4–5.3)
RBC # BLD AUTO: 3.4 10E12/L (ref 4.4–5.9)
SODIUM SERPL-SCNC: 145 MMOL/L (ref 133–144)
SODIUM SERPL-SCNC: 146 MMOL/L (ref 133–144)
SODIUM SERPL-SCNC: 147 MMOL/L (ref 133–144)
SODIUM SERPL-SCNC: 148 MMOL/L (ref 133–144)
SODIUM SERPL-SCNC: 148 MMOL/L (ref 133–144)
SPECIMEN SOURCE: NORMAL
SPECIMEN SOURCE: NORMAL
WBC # BLD AUTO: 10.4 10E9/L (ref 4–11)

## 2020-06-19 PROCEDURE — 25000132 ZZH RX MED GY IP 250 OP 250 PS 637: Performed by: STUDENT IN AN ORGANIZED HEALTH CARE EDUCATION/TRAINING PROGRAM

## 2020-06-19 PROCEDURE — 84132 ASSAY OF SERUM POTASSIUM: CPT | Performed by: STUDENT IN AN ORGANIZED HEALTH CARE EDUCATION/TRAINING PROGRAM

## 2020-06-19 PROCEDURE — 25000128 H RX IP 250 OP 636: Performed by: NURSE PRACTITIONER

## 2020-06-19 PROCEDURE — 84100 ASSAY OF PHOSPHORUS: CPT | Performed by: INTERNAL MEDICINE

## 2020-06-19 PROCEDURE — 93010 ELECTROCARDIOGRAM REPORT: CPT | Performed by: INTERNAL MEDICINE

## 2020-06-19 PROCEDURE — 25000132 ZZH RX MED GY IP 250 OP 250 PS 637: Performed by: NURSE PRACTITIONER

## 2020-06-19 PROCEDURE — 25000132 ZZH RX MED GY IP 250 OP 250 PS 637

## 2020-06-19 PROCEDURE — 99233 SBSQ HOSP IP/OBS HIGH 50: CPT | Performed by: NURSE PRACTITIONER

## 2020-06-19 PROCEDURE — 93005 ELECTROCARDIOGRAM TRACING: CPT

## 2020-06-19 PROCEDURE — 40000047 ZZH STATISTIC CTO2 CONT OXYGEN TECH TIME EA 90 MIN

## 2020-06-19 PROCEDURE — 83735 ASSAY OF MAGNESIUM: CPT | Performed by: INTERNAL MEDICINE

## 2020-06-19 PROCEDURE — 84295 ASSAY OF SERUM SODIUM: CPT | Performed by: NURSE PRACTITIONER

## 2020-06-19 PROCEDURE — 85027 COMPLETE CBC AUTOMATED: CPT | Performed by: INTERNAL MEDICINE

## 2020-06-19 PROCEDURE — 99207 ZZC APP CREDIT; MD BILLING SHARED VISIT: CPT | Performed by: INTERNAL MEDICINE

## 2020-06-19 PROCEDURE — 27210437 ZZH NUTRITION PRODUCT SEMIELEM INTERMED LITER

## 2020-06-19 PROCEDURE — 83690 ASSAY OF LIPASE: CPT | Performed by: INTERNAL MEDICINE

## 2020-06-19 PROCEDURE — G0463 HOSPITAL OUTPT CLINIC VISIT: HCPCS

## 2020-06-19 PROCEDURE — 25000128 H RX IP 250 OP 636: Performed by: STUDENT IN AN ORGANIZED HEALTH CARE EDUCATION/TRAINING PROGRAM

## 2020-06-19 PROCEDURE — 40000275 ZZH STATISTIC RCP TIME EA 10 MIN

## 2020-06-19 PROCEDURE — 80048 BASIC METABOLIC PNL TOTAL CA: CPT | Performed by: INTERNAL MEDICINE

## 2020-06-19 PROCEDURE — 36415 COLL VENOUS BLD VENIPUNCTURE: CPT | Performed by: INTERNAL MEDICINE

## 2020-06-19 PROCEDURE — 36415 COLL VENOUS BLD VENIPUNCTURE: CPT | Performed by: STUDENT IN AN ORGANIZED HEALTH CARE EDUCATION/TRAINING PROGRAM

## 2020-06-19 PROCEDURE — 25000132 ZZH RX MED GY IP 250 OP 250 PS 637: Performed by: INTERNAL MEDICINE

## 2020-06-19 PROCEDURE — 84295 ASSAY OF SERUM SODIUM: CPT | Performed by: STUDENT IN AN ORGANIZED HEALTH CARE EDUCATION/TRAINING PROGRAM

## 2020-06-19 PROCEDURE — 36592 COLLECT BLOOD FROM PICC: CPT | Performed by: NURSE PRACTITIONER

## 2020-06-19 PROCEDURE — 36592 COLLECT BLOOD FROM PICC: CPT | Performed by: STUDENT IN AN ORGANIZED HEALTH CARE EDUCATION/TRAINING PROGRAM

## 2020-06-19 PROCEDURE — 12000004 ZZH R&B IMCU UMMC

## 2020-06-19 RX ADMIN — ACETAMINOPHEN 650 MG: 325 TABLET, FILM COATED ORAL at 11:49

## 2020-06-19 RX ADMIN — TICAGRELOR 90 MG: 90 TABLET ORAL at 20:29

## 2020-06-19 RX ADMIN — ONDANSETRON 4 MG: 2 INJECTION INTRAMUSCULAR; INTRAVENOUS at 20:23

## 2020-06-19 RX ADMIN — LEVETIRACETAM 1000 MG: 100 SOLUTION ORAL at 08:58

## 2020-06-19 RX ADMIN — ACETAMINOPHEN 650 MG: 325 TABLET, FILM COATED ORAL at 21:00

## 2020-06-19 RX ADMIN — PIPERACILLIN AND TAZOBACTAM 4.5 G: 4; .5 INJECTION, POWDER, FOR SOLUTION INTRAVENOUS at 06:06

## 2020-06-19 RX ADMIN — METOCLOPRAMIDE HYDROCHLORIDE 10 MG: 5 SOLUTION ORAL at 20:16

## 2020-06-19 RX ADMIN — CHLORHEXIDINE GLUCONATE 0.12% ORAL RINSE 15 ML: 1.2 LIQUID ORAL at 08:23

## 2020-06-19 RX ADMIN — ALTEPLASE 1 MG: 2.2 INJECTION, POWDER, LYOPHILIZED, FOR SOLUTION INTRAVENOUS at 11:49

## 2020-06-19 RX ADMIN — CHLORHEXIDINE GLUCONATE 0.12% ORAL RINSE 15 ML: 1.2 LIQUID ORAL at 11:49

## 2020-06-19 RX ADMIN — Medication 40 MG: at 08:59

## 2020-06-19 RX ADMIN — LEVETIRACETAM 1000 MG: 100 SOLUTION ORAL at 20:15

## 2020-06-19 RX ADMIN — HEPARIN SODIUM 5000 UNITS: 5000 INJECTION, SOLUTION INTRAVENOUS; SUBCUTANEOUS at 00:34

## 2020-06-19 RX ADMIN — POTASSIUM CHLORIDE 20 MEQ: 29.8 INJECTION, SOLUTION INTRAVENOUS at 06:52

## 2020-06-19 RX ADMIN — QUETIAPINE FUMARATE 100 MG: 50 TABLET ORAL at 22:40

## 2020-06-19 RX ADMIN — POTASSIUM CHLORIDE 20 MEQ: 29.8 INJECTION, SOLUTION INTRAVENOUS at 09:08

## 2020-06-19 RX ADMIN — MICONAZOLE NITRATE: 20 POWDER TOPICAL at 21:01

## 2020-06-19 RX ADMIN — CHLORHEXIDINE GLUCONATE 0.12% ORAL RINSE 15 ML: 1.2 LIQUID ORAL at 20:17

## 2020-06-19 RX ADMIN — ASPIRIN 81 MG CHEWABLE TABLET 81 MG: 81 TABLET CHEWABLE at 08:59

## 2020-06-19 RX ADMIN — ONDANSETRON 4 MG: 2 INJECTION INTRAMUSCULAR; INTRAVENOUS at 08:22

## 2020-06-19 RX ADMIN — MICONAZOLE NITRATE: 20 POWDER TOPICAL at 13:03

## 2020-06-19 RX ADMIN — HEPARIN SODIUM 5000 UNITS: 5000 INJECTION, SOLUTION INTRAVENOUS; SUBCUTANEOUS at 16:53

## 2020-06-19 RX ADMIN — POTASSIUM CHLORIDE 20 MEQ: 29.8 INJECTION, SOLUTION INTRAVENOUS at 01:10

## 2020-06-19 RX ADMIN — MELATONIN TAB 3 MG 10 MG: 3 TAB at 22:40

## 2020-06-19 RX ADMIN — ONDANSETRON 4 MG: 2 INJECTION INTRAMUSCULAR; INTRAVENOUS at 02:04

## 2020-06-19 RX ADMIN — PIPERACILLIN AND TAZOBACTAM 4.5 G: 4; .5 INJECTION, POWDER, FOR SOLUTION INTRAVENOUS at 11:47

## 2020-06-19 RX ADMIN — CHLORHEXIDINE GLUCONATE 0.12% ORAL RINSE 15 ML: 1.2 LIQUID ORAL at 16:54

## 2020-06-19 RX ADMIN — TICAGRELOR 90 MG: 90 TABLET ORAL at 08:59

## 2020-06-19 RX ADMIN — Medication 5 ML: at 22:43

## 2020-06-19 RX ADMIN — HEPARIN SODIUM 5000 UNITS: 5000 INJECTION, SOLUTION INTRAVENOUS; SUBCUTANEOUS at 08:59

## 2020-06-19 RX ADMIN — POTASSIUM CHLORIDE 20 MEQ: 29.8 INJECTION, SOLUTION INTRAVENOUS at 14:07

## 2020-06-19 RX ADMIN — PIPERACILLIN AND TAZOBACTAM 4.5 G: 4; .5 INJECTION, POWDER, FOR SOLUTION INTRAVENOUS at 00:29

## 2020-06-19 RX ADMIN — Medication 40 MG: at 20:15

## 2020-06-19 RX ADMIN — PIPERACILLIN AND TAZOBACTAM 4.5 G: 4; .5 INJECTION, POWDER, FOR SOLUTION INTRAVENOUS at 18:54

## 2020-06-19 RX ADMIN — METOCLOPRAMIDE HYDROCHLORIDE 10 MG: 5 SOLUTION ORAL at 08:22

## 2020-06-19 RX ADMIN — ONDANSETRON 4 MG: 2 INJECTION INTRAMUSCULAR; INTRAVENOUS at 14:09

## 2020-06-19 ASSESSMENT — ACTIVITIES OF DAILY LIVING (ADL)
ADLS_ACUITY_SCORE: 18
ADLS_ACUITY_SCORE: 12
ADLS_ACUITY_SCORE: 18
ADLS_ACUITY_SCORE: 12
ADLS_ACUITY_SCORE: 13
ADLS_ACUITY_SCORE: 18

## 2020-06-19 ASSESSMENT — MIFFLIN-ST. JEOR: SCORE: 1672.75

## 2020-06-19 NOTE — PROGRESS NOTES
[facility] Nurse Inpatient Pressure Injury Assessment   Reason for consultation: Evaluate and treat tongue and gluteal cleft      ASSESSMENT  Pressure Injury: on left top of tongue, hospital acquired    This is a Medical Device Related Pressure Injury (MDRPI) due to ETT  Pressure Injury is Stage Mucosal - stable  Contributing factor of the pressure injury: pressure, microclimate and immobility  Status: Significantly deteriorated now has split tongue 5/27. Stable on 6/7-   6/14- Pt keep clenching his teeth and not following command. So unable to visualize the area. RN reports having hard time with oral care as pt keeps his teeth clamped. Pt has tracheostomy in place and no pressure from ETT on the tongue.  6/19 patient unable to open mouth wide and stick out tongue on command but did move tongue while coughing, it appears stable per 6/8 ENT note His tongue will likely heal in a forked shape, and would require a surgery for debridement and suturing for definitive repair. Since he will likely not be eating or speaking we will hold off on any additional surgery. He may follow up in ENT clinic in the future if he and his family elect they would like repair.    Perineum and Posterior scrotum incontinence associated dermatitis   Status: initial assessment    TREATMENT PLAN  Tongue forked: Ensure thorough oral cares 4x daily    Pernieum and scrotal denudement BID and after each incontinent episode use alyssa cleanse and protect and ama dry wipes. Remove only soiled paste, then reapply thin layer of critic aid barrier paste.  If complete removal is needed use baby/mineral oil. Avoid pre moisten wipes. Avoid use of diaper    Orders Updated  WO Nurse follow-up plan:every other week  Nursing to notify the Provider(s) and re-consult the [facility] Nurse if wound(s) deteriorates or new skin concern.    Patient History  According to provider note(s):  Scot Bishop is a 39 year old male who was admitted on 5/17/2020 for cardiac arrest. Pt  reportedly had chest pain that started on 5/16/20. He was using Drano on his pipes at home and did not initially seek medical attention for CP and SOB since it said on the box that Drano can cause those symptoms. He took a shower and had syncopal episode after coming out of the shower. EMS was called; initial rhythm asystole. With chest compressions pt eventually had PEA and then eventually had VF in the field. After multiple cycles of epinephrine had ROSC. He was intubated in the field.   Pt was brought to [facility] ED where he regained a pulse and was brought to the Cath Lab for coronary angiogram. Initially, BP was 90s/60s as he was being transported from ED but he had recurrent cardiac arrest on arrival to Cath Lab. ECG showed ST elevation in aVR. He was promptly placed on VA ECMO. He had thrombotic occlusion of proximal LAD and underwent successful aspiration thrombectomy and PCI w/ NAZIA of proximal and mid LAD.     Objective Data  Containment of urine/stool: Incontinence Protocol and External catheter    Current Diet/ Nutrition:  None      Output:   I/O last 3 completed shifts:  In: 2750.25 [I.V.:675.25; NG/GT:1385]  Out: 950 [Urine:950]    Risk Assessment:   Sensory Perception: 2-->very limited  Moisture: 2-->very moist  Activity: 1-->bedfast  Mobility: 2-->very limited  Nutrition: 3-->adequate  Friction and Shear: 2-->potential problem  Burak Score: 12      Labs:   Recent Labs   Lab 06/19/20  0449 06/18/20  1158   ALBUMIN  --  3.0*   HGB 10.1*  --    WBC 10.4  --        Physical Exam  Skin inspection: focused tongue, bilateral buttock, scrotum    Wound Location:  Left top of tongue                   6/1 6/19       Date of last Photo 6/9 unable to obtain photo with patient sticking tongue out, patient not following commands - but was able to visualize patient moving tongue when he coughed  Wound History: See prior [facility] notes for follow wound history, Trach placed 6/8 no current pressure on  tongue  Measurements (length x width x depth, in cm) unable to measure how far back wound goes, as pt unable to stick his tongue out.   Wound Base:  Tongue split, also appears to have missing tissue, white tissue has resolved now noted pink mucosa which is stable  Palpation of the wound bed: normal   Periwound skin: mucosa  Color: pale  Temperature: normal   Drainage:, none  Description of drainage: none  Odor: none  Pain: no grimacing or signs of discomfort,     Wound Location:  Perineum and posterior scrotum  Wound History: Patient incontinent, has condom catheter in place for urine containment, using brief for stool containment as patient is very restless in bed   Measurements (length x width x depth, in cm) Both perineum and posterior scrotum 5 cm  x  3 cm area   Wound Base:  Clean partial thickness denudment   Tunneling N/A  Undermining N/A  Palpation of the wound bed: normal   Periwound skin: erythema- blanchable  Color: red  Temperature: normal   Drainage:, small  Description of drainage: serosanguinous  Odor: none  Pain: tension to hands, feet and body,      Interventions  Current support surface: Standard  Atmos Air mattress  Current off-loading measures: Pillows  Repositioning aid: Pillows  Visual inspection of wound(s) completed   Wound Care: was done - criticaid applied  Supplies: none  Educated provided: plan of care, wound progress and Moisture management  Education provided to: nurse  Discussed importance of:repositioning every 2 hours, their role in pressure injury prevention and moisture management  Discussed plan of care with Nurse,     Uyen Andersen RN, CWOCN

## 2020-06-19 NOTE — PLAN OF CARE
A: JUDI orientation, patient non-verbal and not following any commands. Pt remains restless frequently thrashing or pulling at lines (sitter at bedside for safety). VS unchanged, patient with trach in place on TD 21% suctioning Q4-5H during shift, sputum remains bloody. Sinus Tach 100-120. Low grade fever, tylenol utilized 1x during shift. Non-verbal indicators of pain including restlessness, prn tylenol given and repositioned as needed. No S&S of nausea with scheduled antiemetics. TF via PEG tube, increased Q6H or as tolerated, currently running at 40 ml/h. Na remains high, D5 ordered with Q4H Na checks, last check WNL. No new skin deficits noted. Scratches on face from irritation and patient frequently moving around or scratching face. Independently repositions in bed but staff helps Q2H with full repos. Condom cath in place. Otherwise incontinent stool. Sitter at bedside for safety.     I/O this shift:  In: 876.5 [I.V.:186.5; NG/GT:470]  Out: 250 [Urine:250]    Temp:  [97.1  F (36.2  C)-100.1  F (37.8  C)] 100  F (37.8  C)  Pulse:  [103-114] 103  Heart Rate:  [105-115] 115  Resp:  [14-19] 19  BP: ()/(56-75) 99/66  FiO2 (%):  [21 %-30 %] 21 %  SpO2:  [95 %-100 %] 97 %     R: Continue with POC. Notify primary team with changes.

## 2020-06-19 NOTE — PLAN OF CARE
Neuro: Thrashes in bed. Tries to rotate head towards left rail, feet towards right rail. Rails padded with seizure pads. Does not track or follow commands. Non-verbal. Did not sleep. Mitts applied for line protection.   Cardiac: ST. VSS.        Respiratory: Sating adequately on 21% TD. Blood tinged sputum, suction every 2 hours when needed. HR up to 135 with suctioning. All suctioning charted on Vitals flow sheet.  GI/: Adequate urine output. Condom cath. Incontinent of stool. No emesis for > 24 hours.   Diet/appetite: NPO. Enteral feedings cycling up. Tolerating well. Residual TF checked, WNL (see I&O flow sheet).   Activity:  Moves himself in bed constantly, without purpose.   Pain: No physiological indicators. .   Skin: No new deficits noted.  LDA's: Tamara #6  G-tube  Condom cath  Plan: Continue with POC. Notify primary team with changes. Continue to monitor every 4 hour sodium checks.

## 2020-06-19 NOTE — PROVIDER NOTIFICATION
Sodium level 148. Alexis Ortez MD notified. D5W increased back up to 30 ml/hr. Recheck due at 0400.

## 2020-06-19 NOTE — PROGRESS NOTES
Interventional Cardiology Progress Note      Assessment & Plan:  Scot Bishop is a 39 year old male with no known significant past medical history who presented to [facility] on 5/17/20 following a PEA/VF arrest and anterior STEMI. He was emergently taken to the cath lab and promptly placed on VA ECMO. Angiography revealed a thrombotic occlusion of the LAD successfully treated with aspiration thrombectomy and PCI w/NAZIA of proximal LAD.     Important dates  - Rewarmed 5/19  - De-cannulated 5/19  - IABP discontinued 5/20  - Trach 6/8   - PEG 6/9     # Hypoxic Brain injury  Initial CT head negative. Cooled to 34 degrees on arrival, rewarmed 5/19. EEG on 5/25 showed severe diffuse encephalopathy without seizures or epileptiform discharges. Repeat CTH on 5/25 showed multifocal acute/subacute cerebral infarcts. Brain MRI 5/26 showed multiple diffuse infarcts consistent with hypoxic brain injury. Concerns for LUE posturing and lack of LLE movement, repeat CTH 6/3 showed no acute changes. Trach placed 6/8. Moving all extremities spontaneously although nothing to command. Occasionally making eye contact and tracking at times. Remains 1:1 with sitter for safety concerns 2/2 to non-purposeful movements although not pulling at any lines.  - Continue Seroquel 100 qPM  - Wean 1:1 sitter     #Emesis  Recurrent emesis since PEG tube placement and TF. Abdominal xray and ultrasound unremarkable. Loose stools which are not new. Liver enzymes normal, T bili 0.4. Lipase 495 today. Placed NJ for relief, patient pulled out from non-purposeful movements. No obvious source for recurrent emesis on CT chest/abdomen/pelvis. Currently tolerating TF at goal 60 ml/hr. Scheduled zofran and reglan for relief. Plan to transition to G-J tube on 6/21. All non-essential meds were discontinued (metoprolol, lisinopril, lasix). No episodes of vomiting since 6/17.   - Continue scheduled zofran q6h  - Continue reglan BID  - Protonix BID (for upper GIB which  has now resolved)  - Continue holding all non-essential meds, will re-consider re-starting tomorrow if tolerating TF.     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 He was using Drano on his pipes at home and did not initially seek medical attention for CP and SOB since it said on the box that Drano can cause those symptoms. He took a shower and had syncopal episode after coming out of the shower. EMS was called; initial rhythm asystole. With chest compressions pt eventually had PEA and then eventually had VF in the field. After multiple cycles of epinephrine had ROSC. He was intubated in the field. Pt was brought to [facility] ED where he regained a pulse and was brought to the Cath Lab for coronary angiogram. Initially, BP was 90s/60s as he was being transported from ED but he had recurrent cardiac arrest on arrival to Cath Lab. ECG showed ST elevation in aVR. He was promptly placed on VA ECMO. He had thrombotic occlusion of proximal LAD and underwent successful aspiration thrombectomy and PCI w/ NAZIA of proximal and mid LAD. Patient remains in NSR with rates in the 110-120's. Suspect ongoing tachycardia d/t evolving MI and post-arrest state. TTE 6/10 showed LVEF 36% with LAD territory akinesis, RV normal. Due to ongoing emesis, all non-essential meds are being held. Consider re-starting these tomorrow if he continues to tolerate his TF. Patient had 23 second run of VT overnight on 5/27. Monitoring QTc while on seroquel. QTc 456 today.   - Decannulated 5/19  - IABP discontinued 5/20  - Continue aspirin 81 mg and ticagrelor 90 mg BID  - Continue to hold metoprolol, lisinopril, atorvastatin, and lasix given recent ongoing emesis.   - Daily EKGs to monitor QTc while on seroquel    # Hypernatremia  Na levels 149 on 6/17. Now improving with Na 146 today.  - Continue  ml q2h  - Continue D5 infusion  - Na check q12h    # Aspiration pneumonia,  improving   # Leukocytosis, improving   GNR on sputum cultures, blood cultures 6/1 grew GPCs, sputum cultures 6/2 grew non-lactose fermenting GNR. ID consulted for persistent fevers, re-cultured on 6/13 for low grade temp and elevated WBC, blood culture negative, sputum grew acinetobacter. Per ID, empirically treating with zosyn x 7 days (end date 6/20). Repeat c-diff and UA negative. CXR unchanged. CT negative for other source of infection. Currently afebrile, WBC nml today.   - Continue zosyn x 7 days (end date 6/20)  - Continue to monitor closely for signs of infection given lines     # Acute hypoxic respiratory failure, improving   Admission CT chest showed bilateral consolidations consistent with aspiration PNA. Patient experienced thick secretions that required bagging on 5/22. Bronchoscopy performed 5/21. Pulmonary was following, now signed off. Significant pressure injury of tongue from ET tube with splitting. CXR on 6/15 showed stable cervical subcutaneous emphysema. CT demonstrated crepitus extending to mediastinum and pectoralis, NTD. Trach placed 6/8, tolerating room air. Sputum culture grew GNR, vanc/zosyn added 6/4. Zosyn restarted 6/14 for ongoing low grade temp and elevated WBC, non-lactose fermenting GNR in sputum, zosyn re-started for 7-day course. 100.1 temp 6/18, re-cultured. WBC nml today. COVID negative.   - Continue zosyn for 7-day treatment (last dose 6/20)  - continue mucomyst and albuterol nebs PRN  - ENT was consulted for pressure injury of tongue, plan for follow up as OP for possible surgical repair     Resolved hospital problems  # Acute kidney injury, resolved    Cr 1.18, BUN 21 today. Renal function stable  - Continue  ml q2h  - Monitor I/O's  - Replace lytes per protocol    # Thrombocytosis, resolved   Receiving aspirin and ticagrelor for NAZIA. Transfused 1 unit PRBCs 5/21. Plts 258 and Hgb 10 today. No signs of active bleeding. Right inguinal hematoma on US at ECMO cannulae  "insertion site, ongoing serosanguinous oozing from lateral aspect of site.   - Continue to monitor     # Shock liver, resolved   LFTs normal      Prophylaxis: Aspirin and brilinta  GI: Protonix  DVT: see above    Diet: TF at 60 ml/hr   Activity:  Code Status: Full   Disposition: [facility] pending bed placement. Barriers include 1:1 sitter.     Patient seen and discussed w/ Dr. Matamoros.      Roxanna Mukherjee DNP, APRN, CNP  [facility]  Interventional Cardiology  [phone]      Interval History:  No acute events overnight. No episodes of emesis since 6/17. Tolerating TF at goal. Na improving. Non-purposeful movements. 1:1 sitter. Trach intact, stable on RA. VSS.     Most recent vital signs:  /87 (BP Location: Left arm)   Pulse 122   Temp 98.1  F (36.7  C) (Axillary)   Resp 16   Ht 1.676 m (5' 6\")   Wt 82 kg (180 lb 12.4 oz)   SpO2 97%   BMI 29.18 kg/m    Temp:  [98.1  F (36.7  C)-100.1  F (37.8  C)] 98.1  F (36.7  C)  Pulse:  [111-122] 122  Heart Rate:  [111-123] 113  Resp:  [16-20] 16  BP: ()/(58-87) 125/87  FiO2 (%):  [21 %] 21 %  SpO2:  [96 %-99 %] 97 %  Wt Readings from Last 2 Encounters:   06/19/20 82 kg (180 lb 12.4 oz)       Intake/Output Summary (Last 24 hours) at 6/19/2020 0903  Last data filed at 6/19/2020 0700  Gross per 24 hour   Intake 2625.25 ml   Output 750 ml   Net 1875.25 ml       Physical exam:  General: In bed, in NAD  HEENT: EOMI, PERRLA, no scleral icterus or injection  CARDIAC: RRR, no m/r/g appreciated. Peripheral pulses 2+  RESP: CTAB, no wheezes, rhonchi or crackles appreciated.  GI: NABS, NT/ND, no guarding or rebound  EXTREMITIES: NO LE edema, pulses 2+. Femoral access site w/o bleeding, dressing c/d/i. No bruits appreciated.   SKIN: No acute lesions appreciated  NEURO: Alert and oriented X3, CN II-XII grossly intact, no focal neurological deficits noted    Drains and Tubes: All drain and tube sites are all benign, no signs of infection  Access: " PICC line     Labs (Past three days):  CBC  Recent Labs   Lab 06/19/20 0449 06/18/20  0541 06/17/20  0331 06/16/20  0428   WBC 10.4 8.7 10.4 11.7*   RBC 3.40* 3.37* 3.49* 3.35*   HGB 10.1* 9.9* 10.3* 9.8*   HCT 33.6* 33.6* 33.8* 32.7*   MCV 99 100 97 98   MCH 29.7 29.4 29.5 29.3   MCHC 30.1* 29.5* 30.5* 30.0*   RDW 14.0 14.0 14.0 14.1    281 302 311     BMP  Recent Labs   Lab 06/19/20 0449 06/18/20  2359 06/18/20 2058 06/18/20  1631 06/18/20  1158 06/18/20  0541  06/17/20  0331 06/16/20  0428   *  148* 148* 143 150* 149* 148*   < > 146* 145*   POTASSIUM 3.3*  --   --  3.4 3.5 3.4   < > 3.5 3.4   CHLORIDE 116*  --   --  117* 116* 115*   < > 112* 113*   CO2 26  --   --  26 28 27   < > 30 26   ANIONGAP 5  --   --  8 6 6   < > 5 6   *  --   --  103* 101* 94   < > 93 92   BUN 21  --   --  21 22 22   < > 22 22   CR 1.18  --   --  1.24 1.16 1.15   < > 1.11 1.01   GFRESTIMATED 76  --   --  72 78 79   < > 82 >90   GFRESTBLACK 88  --   --  83 >90 >90   < > >90 >90   TEN 9.3  --   --  9.6 9.7 9.6   < > 9.6 9.5   MAG 2.1  --   --   --   --  2.1  --  2.3 2.2   PHOS 2.8  --   --   --   --  3.7  --  4.5 3.9    < > = values in this interval not displayed.     Troponins:   Lab Results   Component Value Date    TROPI 0.099 (H) 06/01/2020    TROPI 0.153 (HH) 05/31/2020    TROPI 0.218 (HH) 05/30/2020    TROPI 0.348 (HH) 05/29/2020    TROPI 0.552 (HH) 05/28/2020        INRNo lab results found in last 7 days.  Liver panel  Recent Labs   Lab 06/18/20  1158   PROTTOTAL 8.1   ALBUMIN 3.0*   BILITOTAL 0.4   ALKPHOS 119   AST 31   ALT 54       Imaging/procedure results:  EKG 12 Lead: 6/19/20      ECHO: 6/10/20  Interpretation Summary  LVEF 36% based on biplane 2D tracing.  LAD territory akinesis.  Right ventricular function, chamber size, wall motion, and thickness are  normal.  The inferior vena cava is normal.  No pericardial effusion is present.  There has been no change.    5/26/20  Interpretation Summary  Limited  study to evaluate biventricular function.     Left ventricular size is normal. The Ejection Fraction is estimated at 35-40%.  The mid to distal anterolateral wall, the apex and distal septum is akinetic  consistent with LAD territory injury.  Right ventricular function, chamber size, wall motion, and thickness are  normal.  No pericardial effusion is present.     Compared to prior study EF appears grossly similar.    Coronary Angiogram: 5/17/20  Conclusion     Acute anterolateral myocardial infarction, with out of hospital cardiac arrest, ROSC, and recurrent arrest on arrival to cath lab    Single vessel CAD with thrombotic occlusion of proximal LAD    Successful aspiration thrombectomy of the proximal LAD    Successful stenting of proximal-mid LAD with NAZIA (3.0x20 Synergy), post-dilation to 4.0 mm guided by IVUS.    Successful POBA (with final kissing balloon) of ostial D1    Cardiac resuscitation with inotropic therapy    CPR    Successful placement of periperhal V-A ECMO with Cardiohelp (RFA, RFV)    Successful placement of Zoll Quattro cooling catheter for Thermoguard hypothermia    Successful placement of RT radial arterial line    IVUS was performed on the proximal/mid LAD post stenting    Successful insertion of distal antegrade Flex sheath into RT SFA

## 2020-06-20 LAB
ANION GAP SERPL CALCULATED.3IONS-SCNC: 6 MMOL/L (ref 3–14)
BACTERIA SPEC CULT: NO GROWTH
BACTERIA SPEC CULT: NO GROWTH
BUN SERPL-MCNC: 22 MG/DL (ref 7–30)
CALCIUM SERPL-MCNC: 9 MG/DL (ref 8.5–10.1)
CHLORIDE SERPL-SCNC: 113 MMOL/L (ref 94–109)
CO2 SERPL-SCNC: 25 MMOL/L (ref 20–32)
CREAT SERPL-MCNC: 1.03 MG/DL (ref 0.66–1.25)
ERYTHROCYTE [DISTWIDTH] IN BLOOD BY AUTOMATED COUNT: 14 % (ref 10–15)
GFR SERPL CREATININE-BSD FRML MDRD: 90 ML/MIN/{1.73_M2}
GLUCOSE SERPL-MCNC: 119 MG/DL (ref 70–99)
HCT VFR BLD AUTO: 32.8 % (ref 40–53)
HGB BLD-MCNC: 9.9 G/DL (ref 13.3–17.7)
LIPASE SERPL-CCNC: 487 U/L (ref 73–393)
MAGNESIUM SERPL-MCNC: 1.9 MG/DL (ref 1.6–2.3)
MCH RBC QN AUTO: 29.6 PG (ref 26.5–33)
MCHC RBC AUTO-ENTMCNC: 30.2 G/DL (ref 31.5–36.5)
MCV RBC AUTO: 98 FL (ref 78–100)
PHOSPHATE SERPL-MCNC: 3.6 MG/DL (ref 2.5–4.5)
PLATELET # BLD AUTO: 215 10E9/L (ref 150–450)
POTASSIUM SERPL-SCNC: 3.3 MMOL/L (ref 3.4–5.3)
POTASSIUM SERPL-SCNC: 3.7 MMOL/L (ref 3.4–5.3)
RBC # BLD AUTO: 3.34 10E12/L (ref 4.4–5.9)
SODIUM SERPL-SCNC: 141 MMOL/L (ref 133–144)
SODIUM SERPL-SCNC: 144 MMOL/L (ref 133–144)
SPECIMEN SOURCE: NORMAL
SPECIMEN SOURCE: NORMAL
WBC # BLD AUTO: 9.4 10E9/L (ref 4–11)

## 2020-06-20 PROCEDURE — 25000132 ZZH RX MED GY IP 250 OP 250 PS 637

## 2020-06-20 PROCEDURE — 25000132 ZZH RX MED GY IP 250 OP 250 PS 637: Performed by: NURSE PRACTITIONER

## 2020-06-20 PROCEDURE — 25000128 H RX IP 250 OP 636: Performed by: NURSE PRACTITIONER

## 2020-06-20 PROCEDURE — 25800030 ZZH RX IP 258 OP 636: Performed by: STUDENT IN AN ORGANIZED HEALTH CARE EDUCATION/TRAINING PROGRAM

## 2020-06-20 PROCEDURE — 99207 ZZC APP CREDIT; MD BILLING SHARED VISIT: CPT | Performed by: NURSE PRACTITIONER

## 2020-06-20 PROCEDURE — 25000128 H RX IP 250 OP 636: Performed by: INTERNAL MEDICINE

## 2020-06-20 PROCEDURE — 93010 ELECTROCARDIOGRAM REPORT: CPT | Performed by: INTERNAL MEDICINE

## 2020-06-20 PROCEDURE — 25000132 ZZH RX MED GY IP 250 OP 250 PS 637: Performed by: STUDENT IN AN ORGANIZED HEALTH CARE EDUCATION/TRAINING PROGRAM

## 2020-06-20 PROCEDURE — 40000275 ZZH STATISTIC RCP TIME EA 10 MIN

## 2020-06-20 PROCEDURE — 27210437 ZZH NUTRITION PRODUCT SEMIELEM INTERMED LITER

## 2020-06-20 PROCEDURE — 84100 ASSAY OF PHOSPHORUS: CPT | Performed by: INTERNAL MEDICINE

## 2020-06-20 PROCEDURE — 25000128 H RX IP 250 OP 636: Performed by: STUDENT IN AN ORGANIZED HEALTH CARE EDUCATION/TRAINING PROGRAM

## 2020-06-20 PROCEDURE — 85027 COMPLETE CBC AUTOMATED: CPT | Performed by: INTERNAL MEDICINE

## 2020-06-20 PROCEDURE — 25000132 ZZH RX MED GY IP 250 OP 250 PS 637: Performed by: INTERNAL MEDICINE

## 2020-06-20 PROCEDURE — 80048 BASIC METABOLIC PNL TOTAL CA: CPT | Performed by: INTERNAL MEDICINE

## 2020-06-20 PROCEDURE — 36592 COLLECT BLOOD FROM PICC: CPT | Performed by: NURSE PRACTITIONER

## 2020-06-20 PROCEDURE — 84132 ASSAY OF SERUM POTASSIUM: CPT | Performed by: NURSE PRACTITIONER

## 2020-06-20 PROCEDURE — 99233 SBSQ HOSP IP/OBS HIGH 50: CPT | Performed by: INTERNAL MEDICINE

## 2020-06-20 PROCEDURE — 83690 ASSAY OF LIPASE: CPT | Performed by: INTERNAL MEDICINE

## 2020-06-20 PROCEDURE — 83735 ASSAY OF MAGNESIUM: CPT | Performed by: INTERNAL MEDICINE

## 2020-06-20 PROCEDURE — 93005 ELECTROCARDIOGRAM TRACING: CPT

## 2020-06-20 PROCEDURE — 12000004 ZZH R&B IMCU UMMC

## 2020-06-20 PROCEDURE — 84295 ASSAY OF SERUM SODIUM: CPT | Performed by: NURSE PRACTITIONER

## 2020-06-20 PROCEDURE — 36592 COLLECT BLOOD FROM PICC: CPT | Performed by: INTERNAL MEDICINE

## 2020-06-20 RX ORDER — LISINOPRIL 5 MG/1
5 TABLET ORAL DAILY
Status: DISCONTINUED | OUTPATIENT
Start: 2020-06-20 | End: 2020-06-25 | Stop reason: HOSPADM

## 2020-06-20 RX ORDER — ATORVASTATIN CALCIUM 10 MG/1
10 TABLET, FILM COATED ORAL EVERY EVENING
Status: DISCONTINUED | OUTPATIENT
Start: 2020-06-20 | End: 2020-06-25 | Stop reason: HOSPADM

## 2020-06-20 RX ORDER — FUROSEMIDE 20 MG
20 TABLET ORAL DAILY
Status: DISCONTINUED | OUTPATIENT
Start: 2020-06-20 | End: 2020-06-21

## 2020-06-20 RX ADMIN — ATORVASTATIN CALCIUM 10 MG: 10 TABLET, FILM COATED ORAL at 20:30

## 2020-06-20 RX ADMIN — ONDANSETRON 4 MG: 2 INJECTION INTRAMUSCULAR; INTRAVENOUS at 08:09

## 2020-06-20 RX ADMIN — POTASSIUM CHLORIDE 30 MEQ: 750 TABLET, EXTENDED RELEASE ORAL at 08:19

## 2020-06-20 RX ADMIN — ONDANSETRON 4 MG: 2 INJECTION INTRAMUSCULAR; INTRAVENOUS at 01:24

## 2020-06-20 RX ADMIN — QUETIAPINE FUMARATE 100 MG: 50 TABLET ORAL at 22:31

## 2020-06-20 RX ADMIN — METOCLOPRAMIDE HYDROCHLORIDE 10 MG: 5 SOLUTION ORAL at 20:14

## 2020-06-20 RX ADMIN — TICAGRELOR 90 MG: 90 TABLET ORAL at 08:19

## 2020-06-20 RX ADMIN — CHLORHEXIDINE GLUCONATE 0.12% ORAL RINSE 15 ML: 1.2 LIQUID ORAL at 08:21

## 2020-06-20 RX ADMIN — Medication 12.5 MG: at 20:30

## 2020-06-20 RX ADMIN — MAGNESIUM SULFATE HEPTAHYDRATE 2 G: 40 INJECTION, SOLUTION INTRAVENOUS at 11:15

## 2020-06-20 RX ADMIN — MICONAZOLE NITRATE: 20 POWDER TOPICAL at 20:30

## 2020-06-20 RX ADMIN — ASPIRIN 81 MG CHEWABLE TABLET 81 MG: 81 TABLET CHEWABLE at 08:20

## 2020-06-20 RX ADMIN — LISINOPRIL 5 MG: 5 TABLET ORAL at 08:19

## 2020-06-20 RX ADMIN — CHLORHEXIDINE GLUCONATE 0.12% ORAL RINSE 15 ML: 1.2 LIQUID ORAL at 16:08

## 2020-06-20 RX ADMIN — ONDANSETRON 4 MG: 2 INJECTION INTRAMUSCULAR; INTRAVENOUS at 14:25

## 2020-06-20 RX ADMIN — POTASSIUM CHLORIDE 20 MEQ: 1.5 POWDER, FOR SOLUTION ORAL at 14:34

## 2020-06-20 RX ADMIN — LEVETIRACETAM 1000 MG: 100 SOLUTION ORAL at 08:18

## 2020-06-20 RX ADMIN — PIPERACILLIN AND TAZOBACTAM 4.5 G: 4; .5 INJECTION, POWDER, FOR SOLUTION INTRAVENOUS at 14:24

## 2020-06-20 RX ADMIN — HEPARIN SODIUM 5000 UNITS: 5000 INJECTION, SOLUTION INTRAVENOUS; SUBCUTANEOUS at 08:17

## 2020-06-20 RX ADMIN — POTASSIUM CHLORIDE 40 MEQ: 1.5 POWDER, FOR SOLUTION ORAL at 06:14

## 2020-06-20 RX ADMIN — Medication 40 MG: at 08:18

## 2020-06-20 RX ADMIN — FUROSEMIDE 20 MG: 20 TABLET ORAL at 08:19

## 2020-06-20 RX ADMIN — PIPERACILLIN AND TAZOBACTAM 4.5 G: 4; .5 INJECTION, POWDER, FOR SOLUTION INTRAVENOUS at 01:24

## 2020-06-20 RX ADMIN — HEPARIN SODIUM 5000 UNITS: 5000 INJECTION, SOLUTION INTRAVENOUS; SUBCUTANEOUS at 16:08

## 2020-06-20 RX ADMIN — DEXTROSE MONOHYDRATE: 50 INJECTION, SOLUTION INTRAVENOUS at 04:18

## 2020-06-20 RX ADMIN — MELATONIN TAB 3 MG 10 MG: 3 TAB at 22:31

## 2020-06-20 RX ADMIN — MICONAZOLE NITRATE: 20 POWDER TOPICAL at 08:22

## 2020-06-20 RX ADMIN — LEVETIRACETAM 1000 MG: 100 SOLUTION ORAL at 20:14

## 2020-06-20 RX ADMIN — ONDANSETRON 4 MG: 2 INJECTION INTRAMUSCULAR; INTRAVENOUS at 20:30

## 2020-06-20 RX ADMIN — METOCLOPRAMIDE HYDROCHLORIDE 10 MG: 5 SOLUTION ORAL at 08:21

## 2020-06-20 RX ADMIN — ACETAMINOPHEN 650 MG: 325 TABLET, FILM COATED ORAL at 23:17

## 2020-06-20 RX ADMIN — Medication 40 MG: at 20:14

## 2020-06-20 RX ADMIN — Medication 12.5 MG: at 08:19

## 2020-06-20 RX ADMIN — TICAGRELOR 90 MG: 90 TABLET ORAL at 20:30

## 2020-06-20 RX ADMIN — CHLORHEXIDINE GLUCONATE 0.12% ORAL RINSE 15 ML: 1.2 LIQUID ORAL at 13:16

## 2020-06-20 RX ADMIN — ALTEPLASE 2 MG: 2.2 INJECTION, POWDER, LYOPHILIZED, FOR SOLUTION INTRAVENOUS at 11:57

## 2020-06-20 RX ADMIN — PIPERACILLIN AND TAZOBACTAM 4.5 G: 4; .5 INJECTION, POWDER, FOR SOLUTION INTRAVENOUS at 20:14

## 2020-06-20 RX ADMIN — Medication 5 ML: at 20:39

## 2020-06-20 RX ADMIN — CHLORHEXIDINE GLUCONATE 0.12% ORAL RINSE 15 ML: 1.2 LIQUID ORAL at 20:30

## 2020-06-20 RX ADMIN — PIPERACILLIN AND TAZOBACTAM 4.5 G: 4; .5 INJECTION, POWDER, FOR SOLUTION INTRAVENOUS at 08:07

## 2020-06-20 RX ADMIN — ALTEPLASE 2 MG: 2.2 INJECTION, POWDER, LYOPHILIZED, FOR SOLUTION INTRAVENOUS at 11:56

## 2020-06-20 RX ADMIN — HEPARIN SODIUM 5000 UNITS: 5000 INJECTION, SOLUTION INTRAVENOUS; SUBCUTANEOUS at 01:23

## 2020-06-20 ASSESSMENT — ACTIVITIES OF DAILY LIVING (ADL)
ADLS_ACUITY_SCORE: 13
ADLS_ACUITY_SCORE: 14
ADLS_ACUITY_SCORE: 14
ADLS_ACUITY_SCORE: 13

## 2020-06-20 ASSESSMENT — MIFFLIN-ST. JEOR: SCORE: 1720.75

## 2020-06-20 NOTE — PLAN OF CARE
Care provided 15:30 - 23:30    Neuro: Pt not verbal, does not respond to commands. No visual tracking. Restless, frequent movement of all extremities. Sitter at bedside.  Cardiac: 's-110's. BP stable. Afebrile.    Respiratory: Sating  on trach dome 21%. Suctioned x5 with moderate secretions, red streaked. Good cough. Secretions appeared more bloody towards end of shift; MD notified.   GI/:  Incontinent. Loose BM X3. Condom cath replaced @ 23:00.    Diet/appetite: Tolerating TF @ 60 ml/hr with 100 fwf q4h.   Activity:  Assist of 2 for turns/repositioning. frequently repositions self in bed. Pt boosted/repositioned frequently; Sezure pads and pillows in place.   Pain: PRN tylenol given x1 for nonverbal indicators of pain.  Skin: No new deficits noted. Barrier cream applied to scrotal area.   LDA's: R TL PICC: Purple TKO + D5, Gray Heparin locked, Red flushed poorly, minimal blood return noted, saline locked. G tube.     Plan: Continue with POC. Notify primary team with changes.

## 2020-06-20 NOTE — PLAN OF CARE
Neuro: Pt not verbal, does not respond to commands. No visual tracking. Restless, frequent movement of all extremities. Sitter at bedside.  Cardiac: 's-110's. BP stable. Afebrile.             Respiratory: Sating upper 90's on trach dome 21%. Suctioned q2-4h with minimal secretions overnight. Good strong cough.   GI/:  Incontinent. Loose BMs overnight. Condom cath replaced x2. Notified provider of decreased UO by patient and bladder scan of 319mL but pt then spontaneously voided 300mL.  Diet/appetite: Tolerating TF @ 60 ml/hr with 100 fwf q2h for NA+.   Activity:  Assist of 2 for turns/repositioning. frequently repositions self in bed. Pt boosted/repositioned frequently; Sezure pads and pillows in place.   Pain: No indicators of pain overnight.  Skin: No new deficits noted. Barrier cream applied to scrotal area.   LDA's: R TL PICC: Purple TKO + D5 good blood returns and flushes well. Gray lumen saline locked with positive blood return and flushes well. Red flushed poorly, no blood return noted, saline locked. G tube.      Plan: Continue with POC. Notify primary team with changes.

## 2020-06-20 NOTE — PROGRESS NOTES
Interventional Cardiology Progress Note      Assessment & Plan:  Scot Bishop is a 39 year old male with no known significant past medical history who presented to [facility] on 5/17/20 following a PEA/VF arrest and anterior STEMI. He was emergently taken to the cath lab and promptly placed on VA ECMO. Angiography revealed a thrombotic occlusion of the LAD successfully treated with aspiration thrombectomy and PCI w/NAZIA of proximal LAD.     Important dates  - Rewarmed 5/19  - De-cannulated 5/19  - IABP discontinued 5/20  - Trach 6/8   - PEG 6/9     # Hypoxic Brain injury  Initial CT head negative. Cooled to 34 degrees on arrival, rewarmed 5/19. EEG on 5/25 showed severe diffuse encephalopathy without seizures or epileptiform discharges. Repeat CTH on 5/25 showed multifocal acute/subacute cerebral infarcts. Brain MRI 5/26 showed multiple diffuse infarcts consistent with hypoxic brain injury. Concerns for LUE posturing and lack of LLE movement, repeat CTH 6/3 showed no acute changes. Trach placed 6/8. Moving all extremities spontaneously although nothing to command. Occasionally making eye contact and tracking at times. Remains 1:1 with sitter for safety concerns 2/2 to non-purposeful movements although not pulling at any lines.  - Continue Seroquel 100 qPM  - Wean 1:1 sitter     #Emesis  Recurrent emesis since PEG tube placement and TF. Abdominal xray and ultrasound unremarkable. Loose stools which are not new. Liver enzymes normal, T bili 0.4. Lipase 495 today. Placed NJ for relief, patient pulled out from non-purposeful movements. No obvious source for recurrent emesis on CT chest/abdomen/pelvis. Currently tolerating TF at goal 60 ml/hr. Scheduled zofran and reglan for relief. Plan to transition to G-J tube on 6/21. All non-essential meds were discontinued (metoprolol, lisinopril, lasix). No episodes of vomiting since 6/17. Tolerating TF at goal 60 ml/hr.   - Continue scheduled zofran q6h  - Continue reglan BID  -  Protonix BID (for upper GIB which has now resolved)  - Re-start lipitor, lisinopril, metoprolol, and lasix today (see below for dosing)    # Refractory VF arrest s/p NAZIA to pLAD  # Sinus Tachycardia   Pt reportedly had chest pain that started on 5/16/20. He was using Drano on his pipes at home and did not initially seek medical attention for CP and SOB since it said on the box that Drano can cause those symptoms. He took a shower and had syncopal episode after coming out of the shower. EMS was called; initial rhythm asystole. With chest compressions pt eventually had PEA and then eventually had VF in the field. After multiple cycles of epinephrine had ROSC. He was intubated in the field. Pt was brought to [facility] ED where he regained a pulse and was brought to the Cath Lab for coronary angiogram. Initially, BP was 90s/60s as he was being transported from ED but he had recurrent cardiac arrest on arrival to Cath Lab. ECG showed ST elevation in aVR. He was promptly placed on VA ECMO. He had thrombotic occlusion of proximal LAD and underwent successful aspiration thrombectomy and PCI w/ NAZIA of proximal and mid LAD. Patient remains in NSR with rates in the 110-120's. Suspect ongoing tachycardia d/t evolving MI and post-arrest state. TTE 6/10 showed LVEF 36% with LAD territory akinesis, RV normal. Due to ongoing emesis, all non-essential meds are being held. Consider re-starting these tomorrow if he continues to tolerate his TF. Patient had 23 second run of VT overnight on 5/27. Monitoring QTc while on seroquel. QTc 456 6/19.   - Decannulated 5/19  - IABP discontinued 5/20  - Continue aspirin 81 mg and ticagrelor 90 mg BID  - Will restart metoprolol tartrate 12.5 mg BID  - Re-start lisinopril 5 mg daily   - Re-start atorvastatin 10 mg daily   - Re-start lasix 20 mg daily   - Daily EKGs to monitor QTc while on seroquel    # Hypernatremia  Na levels 149 on 6/17. Now improving with Na 144 today.  - Continue  ml q4h  -  Continue D5 infusion  - Na check q12h    # Aspiration pneumonia, improving   # Leukocytosis, improving   GNR on sputum cultures, blood cultures 6/1 grew GPCs, sputum cultures 6/2 grew non-lactose fermenting GNR. ID consulted for persistent fevers, re-cultured on 6/13 for low grade temp and elevated WBC, blood culture negative, sputum grew acinetobacter. Per ID, empirically treating with zosyn x 7 days (end date 6/20). Repeat c-diff and UA negative. CXR unchanged. CT negative for other source of infection. Currently afebrile, WBC nml today.   - Continue zosyn x 7 days (end date 6/20)  - Continue to monitor closely for signs of infection given lines     # Acute hypoxic respiratory failure, improving   Admission CT chest showed bilateral consolidations consistent with aspiration PNA. Patient experienced thick secretions that required bagging on 5/22. Bronchoscopy performed 5/21. Pulmonary was following, now signed off. Significant pressure injury of tongue from ET tube with splitting. CXR on 6/15 showed stable cervical subcutaneous emphysema. CT demonstrated crepitus extending to mediastinum and pectoralis, NTD. Trach placed 6/8, tolerating room air. Sputum culture grew GNR, vanc/zosyn added 6/4. Zosyn restarted 6/14 for ongoing low grade temp and elevated WBC, non-lactose fermenting GNR in sputum, zosyn re-started for 7-day course. 100.1 temp 6/18, re-cultured. WBC nml today. COVID negative.   - Continue zosyn for 7-day treatment (last dose 6/20)  - continue mucomyst and albuterol nebs PRN  - ENT was consulted for pressure injury of tongue, plan for follow up as OP for possible surgical repair     # Elevated lipase  Lipase 487 today. CT abdomen/chest/pelvis and abdominal ultrasound unremarkable.   - Continue to monitor closely    Resolved hospital problems  # Acute kidney injury, resolved    Cr 1.18, BUN 21 today. Renal function stable  - Continue  ml q2h  - Monitor I/O's  - Replace lytes per protocol    #  "Thrombocytosis, resolved   Receiving aspirin and ticagrelor for NAZIA. Transfused 1 unit PRBCs 5/21. Plts 258 and Hgb 10 today. No signs of active bleeding. Right inguinal hematoma on US at ECMO cannulae insertion site, ongoing serosanguinous oozing from lateral aspect of site.   - Continue to monitor     # Shock liver, resolved   LFTs normal      Prophylaxis: subcutaneous heparin  GI: Protonix  DVT: see above    Diet: TF at 60 ml/hr   Activity: full assist  Code Status: Full   Disposition: [facility] pending bed placement. Barriers include 1:1 sitter.     Patient seen and discussed w/ Dr. Alanis.      Roxanna Mukherjee DNP, APRN, CNP  [facility]  Interventional Cardiology  [phone]      Interval History:  No acute events overnight. No episodes of emesis since 6/17. Tolerating TF at goal. Na improving. Non-purposeful movements. 1:1 sitter. Trach intact, stable on trach dome 21%. VSS. Good urine output via condom cath.     Most recent vital signs:  /79 (BP Location: Right arm)   Pulse 102   Temp 98.2  F (36.8  C) (Axillary)   Resp 18   Ht 1.676 m (5' 6\")   Wt 86.8 kg (191 lb 5.8 oz)   SpO2 99%   BMI 30.89 kg/m    Temp:  [97.2  F (36.2  C)-98.7  F (37.1  C)] 97.6  F (36.4  C)  Pulse:  [102-122] 102  Heart Rate:  [] 78  Resp:  [16-18] 18  BP: ()/(67-91) 107/76  FiO2 (%):  [21 %] 21 %  SpO2:  [97 %-100 %] 100 %  Wt Readings from Last 2 Encounters:   06/20/20 86.8 kg (191 lb 5.8 oz)       Intake/Output Summary (Last 24 hours) at 6/19/2020 0903  Last data filed at 6/19/2020 0700  Gross per 24 hour   Intake 2625.25 ml   Output 750 ml   Net 1875.25 ml       Physical exam:  General: In bed, in NAD  HEENT: EOMI, PERRLA, no scleral icterus or injection  CARDIAC: RRR, no m/r/g appreciated. Peripheral pulses 2+  RESP: CTAB, no wheezes, rhonchi or crackles appreciated. Trach dome.   GI: NABS, NT/ND, no guarding or rebound  : condom cath in place  EXTREMITIES: NO LE edema, " pulses 2+. Femoral access site w/o bleeding, dressing c/d/i. No bruits appreciated.   SKIN: No acute lesions appreciated. Lines CDI  NEURO: Non-purposeful movements. PERRLA. Moving extremities and opening eyes, although nothing to command. Withdraws to pain. Occasionally making eye contact and tracking.     Drains and Tubes: All drain and tube sites are all benign, no signs of infection  Access: PICC line     Labs (Past three days):  CBC  Recent Labs   Lab 06/20/20  0352 06/19/20  0449 06/18/20  0541 06/17/20  0331   WBC 9.4 10.4 8.7 10.4   RBC 3.34* 3.40* 3.37* 3.49*   HGB 9.9* 10.1* 9.9* 10.3*   HCT 32.8* 33.6* 33.6* 33.8*   MCV 98 99 100 97   MCH 29.6 29.7 29.4 29.5   MCHC 30.2* 30.1* 29.5* 30.5*   RDW 14.0 14.0 14.0 14.0    258 281 302     BMP  Recent Labs   Lab 06/20/20  0352 06/19/20  1636 06/19/20  1206 06/19/20  0449  06/18/20  1631 06/18/20  1158 06/18/20  0541  06/17/20  0331 06/16/20  0428    145* 146* 147*  148*   < > 150* 149* 148*   < > 146* 145*   POTASSIUM 3.3*  --  3.6 3.3*  --  3.4 3.5 3.4   < > 3.5 3.4   CHLORIDE 113*  --   --  116*  --  117* 116* 115*   < > 112* 113*   CO2 25  --   --  26  --  26 28 27   < > 30 26   ANIONGAP 6  --   --  5  --  8 6 6   < > 5 6   *  --   --  115*  --  103* 101* 94   < > 93 92   BUN 22  --   --  21  --  21 22 22   < > 22 22   CR 1.03  --   --  1.18  --  1.24 1.16 1.15   < > 1.11 1.01   GFRESTIMATED 90  --   --  76  --  72 78 79   < > 82 >90   GFRESTBLACK >90  --   --  88  --  83 >90 >90   < > >90 >90   TEN 9.0  --   --  9.3  --  9.6 9.7 9.6   < > 9.6 9.5   MAG  --   --   --  2.1  --   --   --  2.1  --  2.3 2.2   PHOS  --   --   --  2.8  --   --   --  3.7  --  4.5 3.9    < > = values in this interval not displayed.     Troponins:   Lab Results   Component Value Date    TROPI 0.099 (H) 06/01/2020    TROPI 0.153 (HH) 05/31/2020    TROPI 0.218 (HH) 05/30/2020    TROPI 0.348 (HH) 05/29/2020    TROPI 0.552 () 05/28/2020        INRNo lab results  found in last 7 days.  Liver panel  Recent Labs   Lab 06/18/20  1158   PROTTOTAL 8.1   ALBUMIN 3.0*   BILITOTAL 0.4   ALKPHOS 119   AST 31   ALT 54       Imaging/procedure results:  EKG 12 Lead: 6/19/20      ECHO: 6/10/20  Interpretation Summary  LVEF 36% based on biplane 2D tracing.  LAD territory akinesis.  Right ventricular function, chamber size, wall motion, and thickness are  normal.  The inferior vena cava is normal.  No pericardial effusion is present.  There has been no change.    5/26/20  Interpretation Summary  Limited study to evaluate biventricular function.     Left ventricular size is normal. The Ejection Fraction is estimated at 35-40%.  The mid to distal anterolateral wall, the apex and distal septum is akinetic  consistent with LAD territory injury.  Right ventricular function, chamber size, wall motion, and thickness are  normal.  No pericardial effusion is present.     Compared to prior study EF appears grossly similar.    Coronary Angiogram: 5/17/20  Conclusion     Acute anterolateral myocardial infarction, with out of hospital cardiac arrest, ROSC, and recurrent arrest on arrival to cath lab    Single vessel CAD with thrombotic occlusion of proximal LAD    Successful aspiration thrombectomy of the proximal LAD    Successful stenting of proximal-mid LAD with NAZIA (3.0x20 Synergy), post-dilation to 4.0 mm guided by IVUS.    Successful POBA (with final kissing balloon) of ostial D1    Cardiac resuscitation with inotropic therapy    CPR    Successful placement of periperhal V-A ECMO with Cardiohelp (RFA, RFV)    Successful placement of Zoll Quattro cooling catheter for Thermoguard hypothermia    Successful placement of RT radial arterial line    IVUS was performed on the proximal/mid LAD post stenting    Successful insertion of distal antegrade Flex sheath into RT SFA          I have seen and examined the patient with the CSI team. I agree with the assessment and plan of the note above.I have  reviewed pertinent labs.     Jefferson Alanis MD  Interventional Cardiology  Pager: [phone]

## 2020-06-20 NOTE — PROGRESS NOTES
Notified [facility] for cardiology/CSI MD Monalisa that patient did not have any documentable UO on this shift. Pt was bladder scanned and found to have 319mL of urine in bladder. Plan to see if pt spontaneously voids by 0800 and if not will scan again and notify primary team of results.

## 2020-06-20 NOTE — PROVIDER NOTIFICATION
Time of notification: 10:30 PM  Provider notified: Cardiology  Patient status: trach secretions appearing increasingly bloody over last hour. No change in vitals.  Temp:  [97.2  F (36.2  C)-98.9  F (37.2  C)] 97.5  F (36.4  C)  Pulse:  [102-122] 102  Heart Rate:  [109-123] 109  Resp:  [16-20] 18  BP: ()/(58-91) 97/67  FiO2 (%):  [21 %] 21 %  SpO2:  [96 %-100 %] 99 %  Orders received: provider will consult CSI fellow, no orders yet.

## 2020-06-20 NOTE — PROGRESS NOTES
Neuro: Unchanged from previous shift.  Cardiac: SR. VSS.   Respiratory: Sating in high 90's on 21% TD.  Minimal inline suction needs. Shiley 6.0 trach, cares done at 0900.  GI/: Adequate urine output. Loose BM X3  Diet/appetite: Tolerating continuous TF with FWF. Tolerating meds, no signs of nausea, no emesis.  Activity:  Moves extremities frequently while in bed.  Prevalon boots for heel protection. Pulsate Mattress ordered by previous shift and now on unit to replace current mattress.  Weight shifting assistance provided.  Pain: No physiological or overt signs of pain.   Skin: No new deficits noted.  Bottom and groin folds reddened. Diligent pericare with alyssa spray, dry wipes and light Barrier Cream.  Miconazole for groins, scrotum.  LDA's:  PICCx3 ports, condom cath, Shiley 6.0 trach.    Plan: Continue with POC. Notify primary team with changes.  Father provided update on patient condition today via phone while in room.

## 2020-06-21 LAB
ANION GAP SERPL CALCULATED.3IONS-SCNC: 7 MMOL/L (ref 3–14)
BUN SERPL-MCNC: 22 MG/DL (ref 7–30)
CALCIUM SERPL-MCNC: 8.6 MG/DL (ref 8.5–10.1)
CHLORIDE SERPL-SCNC: 106 MMOL/L (ref 94–109)
CO2 SERPL-SCNC: 25 MMOL/L (ref 20–32)
CREAT SERPL-MCNC: 0.93 MG/DL (ref 0.66–1.25)
ERYTHROCYTE [DISTWIDTH] IN BLOOD BY AUTOMATED COUNT: 13.7 % (ref 10–15)
GFR SERPL CREATININE-BSD FRML MDRD: >90 ML/MIN/{1.73_M2}
GLUCOSE SERPL-MCNC: 171 MG/DL (ref 70–99)
HCT VFR BLD AUTO: 31.7 % (ref 40–53)
HGB BLD-MCNC: 9.4 G/DL (ref 13.3–17.7)
MAGNESIUM SERPL-MCNC: 2 MG/DL (ref 1.6–2.3)
MCH RBC QN AUTO: 28.7 PG (ref 26.5–33)
MCHC RBC AUTO-ENTMCNC: 29.7 G/DL (ref 31.5–36.5)
MCV RBC AUTO: 97 FL (ref 78–100)
PLATELET # BLD AUTO: 214 10E9/L (ref 150–450)
POTASSIUM SERPL-SCNC: 3.5 MMOL/L (ref 3.4–5.3)
RBC # BLD AUTO: 3.27 10E12/L (ref 4.4–5.9)
SODIUM SERPL-SCNC: 137 MMOL/L (ref 133–144)
SODIUM SERPL-SCNC: 138 MMOL/L (ref 133–144)
WBC # BLD AUTO: 9.4 10E9/L (ref 4–11)

## 2020-06-21 PROCEDURE — 85027 COMPLETE CBC AUTOMATED: CPT | Performed by: INTERNAL MEDICINE

## 2020-06-21 PROCEDURE — 25000132 ZZH RX MED GY IP 250 OP 250 PS 637: Performed by: STUDENT IN AN ORGANIZED HEALTH CARE EDUCATION/TRAINING PROGRAM

## 2020-06-21 PROCEDURE — 99207 ZZC APP CREDIT; MD BILLING SHARED VISIT: CPT | Performed by: NURSE PRACTITIONER

## 2020-06-21 PROCEDURE — 36592 COLLECT BLOOD FROM PICC: CPT | Performed by: NURSE PRACTITIONER

## 2020-06-21 PROCEDURE — 25000128 H RX IP 250 OP 636: Performed by: INTERNAL MEDICINE

## 2020-06-21 PROCEDURE — 40000558 ZZH STATISTIC CVC DRESSING CHANGE

## 2020-06-21 PROCEDURE — 12000004 ZZH R&B IMCU UMMC

## 2020-06-21 PROCEDURE — 25000132 ZZH RX MED GY IP 250 OP 250 PS 637: Performed by: INTERNAL MEDICINE

## 2020-06-21 PROCEDURE — 25000128 H RX IP 250 OP 636: Performed by: STUDENT IN AN ORGANIZED HEALTH CARE EDUCATION/TRAINING PROGRAM

## 2020-06-21 PROCEDURE — 40000275 ZZH STATISTIC RCP TIME EA 10 MIN

## 2020-06-21 PROCEDURE — 83735 ASSAY OF MAGNESIUM: CPT | Performed by: INTERNAL MEDICINE

## 2020-06-21 PROCEDURE — 82656 EL-1 FECAL QUAL/SEMIQ: CPT

## 2020-06-21 PROCEDURE — 40000047 ZZH STATISTIC CTO2 CONT OXYGEN TECH TIME EA 90 MIN

## 2020-06-21 PROCEDURE — 84295 ASSAY OF SERUM SODIUM: CPT | Performed by: NURSE PRACTITIONER

## 2020-06-21 PROCEDURE — 93010 ELECTROCARDIOGRAM REPORT: CPT | Performed by: INTERNAL MEDICINE

## 2020-06-21 PROCEDURE — 40000556 ZZH STATISTIC PERIPHERAL IV START W US GUIDANCE

## 2020-06-21 PROCEDURE — 93005 ELECTROCARDIOGRAM TRACING: CPT

## 2020-06-21 PROCEDURE — 25000132 ZZH RX MED GY IP 250 OP 250 PS 637

## 2020-06-21 PROCEDURE — 25000128 H RX IP 250 OP 636: Performed by: NURSE PRACTITIONER

## 2020-06-21 PROCEDURE — 36592 COLLECT BLOOD FROM PICC: CPT | Performed by: INTERNAL MEDICINE

## 2020-06-21 PROCEDURE — 25000132 ZZH RX MED GY IP 250 OP 250 PS 637: Performed by: NURSE PRACTITIONER

## 2020-06-21 PROCEDURE — 99233 SBSQ HOSP IP/OBS HIGH 50: CPT | Performed by: INTERNAL MEDICINE

## 2020-06-21 PROCEDURE — 80048 BASIC METABOLIC PNL TOTAL CA: CPT | Performed by: INTERNAL MEDICINE

## 2020-06-21 PROCEDURE — 27210437 ZZH NUTRITION PRODUCT SEMIELEM INTERMED LITER

## 2020-06-21 RX ORDER — LOPERAMIDE HCL 1 MG/7.5ML
2 SUSPENSION ORAL 3 TIMES DAILY PRN
Status: DISCONTINUED | OUTPATIENT
Start: 2020-06-21 | End: 2020-06-25 | Stop reason: HOSPADM

## 2020-06-21 RX ORDER — ONDANSETRON 2 MG/ML
4 INJECTION INTRAMUSCULAR; INTRAVENOUS EVERY 6 HOURS PRN
Status: DISCONTINUED | OUTPATIENT
Start: 2020-06-22 | End: 2020-06-25 | Stop reason: HOSPADM

## 2020-06-21 RX ORDER — FUROSEMIDE 20 MG
20 TABLET ORAL
Status: DISCONTINUED | OUTPATIENT
Start: 2020-06-21 | End: 2020-06-23

## 2020-06-21 RX ORDER — METOCLOPRAMIDE HYDROCHLORIDE 5 MG/5ML
10 SOLUTION ORAL 2 TIMES DAILY PRN
Status: DISCONTINUED | OUTPATIENT
Start: 2020-06-22 | End: 2020-06-25 | Stop reason: HOSPADM

## 2020-06-21 RX ADMIN — FUROSEMIDE 20 MG: 20 TABLET ORAL at 09:17

## 2020-06-21 RX ADMIN — CHLORHEXIDINE GLUCONATE 0.12% ORAL RINSE 15 ML: 1.2 LIQUID ORAL at 13:19

## 2020-06-21 RX ADMIN — LOPERAMIDE HCL 2 MG: 1 SOLUTION ORAL at 17:57

## 2020-06-21 RX ADMIN — LISINOPRIL 5 MG: 5 TABLET ORAL at 09:17

## 2020-06-21 RX ADMIN — ONDANSETRON 4 MG: 2 INJECTION INTRAMUSCULAR; INTRAVENOUS at 02:11

## 2020-06-21 RX ADMIN — LEVETIRACETAM 1000 MG: 100 SOLUTION ORAL at 20:06

## 2020-06-21 RX ADMIN — HEPARIN SODIUM 5000 UNITS: 5000 INJECTION, SOLUTION INTRAVENOUS; SUBCUTANEOUS at 00:01

## 2020-06-21 RX ADMIN — ASPIRIN 81 MG CHEWABLE TABLET 81 MG: 81 TABLET CHEWABLE at 09:16

## 2020-06-21 RX ADMIN — MICONAZOLE NITRATE: 20 POWDER TOPICAL at 20:14

## 2020-06-21 RX ADMIN — MICONAZOLE NITRATE: 20 POWDER TOPICAL at 09:28

## 2020-06-21 RX ADMIN — TICAGRELOR 90 MG: 90 TABLET ORAL at 09:17

## 2020-06-21 RX ADMIN — Medication 12.5 MG: at 09:17

## 2020-06-21 RX ADMIN — POTASSIUM CHLORIDE 20 MEQ: 1.5 POWDER, FOR SOLUTION ORAL at 05:17

## 2020-06-21 RX ADMIN — LOPERAMIDE HCL 2 MG: 1 SOLUTION ORAL at 21:42

## 2020-06-21 RX ADMIN — TICAGRELOR 90 MG: 90 TABLET ORAL at 20:07

## 2020-06-21 RX ADMIN — QUETIAPINE FUMARATE 100 MG: 50 TABLET ORAL at 21:42

## 2020-06-21 RX ADMIN — Medication 40 MG: at 09:16

## 2020-06-21 RX ADMIN — Medication 40 MG: at 20:06

## 2020-06-21 RX ADMIN — CHLORHEXIDINE GLUCONATE 0.12% ORAL RINSE 15 ML: 1.2 LIQUID ORAL at 20:07

## 2020-06-21 RX ADMIN — HEPARIN SODIUM 5000 UNITS: 5000 INJECTION, SOLUTION INTRAVENOUS; SUBCUTANEOUS at 09:32

## 2020-06-21 RX ADMIN — ONDANSETRON 4 MG: 2 INJECTION INTRAMUSCULAR; INTRAVENOUS at 09:18

## 2020-06-21 RX ADMIN — CHLORHEXIDINE GLUCONATE 0.12% ORAL RINSE 15 ML: 1.2 LIQUID ORAL at 16:16

## 2020-06-21 RX ADMIN — ONDANSETRON 4 MG: 2 INJECTION INTRAMUSCULAR; INTRAVENOUS at 15:29

## 2020-06-21 RX ADMIN — Medication 5 ML: at 09:32

## 2020-06-21 RX ADMIN — MAGNESIUM SULFATE HEPTAHYDRATE 2 G: 40 INJECTION, SOLUTION INTRAVENOUS at 07:00

## 2020-06-21 RX ADMIN — ATORVASTATIN CALCIUM 10 MG: 10 TABLET, FILM COATED ORAL at 20:06

## 2020-06-21 RX ADMIN — LEVETIRACETAM 1000 MG: 100 SOLUTION ORAL at 09:16

## 2020-06-21 RX ADMIN — METOCLOPRAMIDE HYDROCHLORIDE 10 MG: 5 SOLUTION ORAL at 09:16

## 2020-06-21 RX ADMIN — CHLORHEXIDINE GLUCONATE 0.12% ORAL RINSE 15 ML: 1.2 LIQUID ORAL at 09:17

## 2020-06-21 RX ADMIN — HEPARIN SODIUM 5000 UNITS: 5000 INJECTION, SOLUTION INTRAVENOUS; SUBCUTANEOUS at 23:40

## 2020-06-21 RX ADMIN — Medication 12.5 MG: at 20:07

## 2020-06-21 RX ADMIN — FUROSEMIDE 20 MG: 20 TABLET ORAL at 16:16

## 2020-06-21 RX ADMIN — MELATONIN TAB 3 MG 10 MG: 3 TAB at 21:42

## 2020-06-21 RX ADMIN — HEPARIN SODIUM 5000 UNITS: 5000 INJECTION, SOLUTION INTRAVENOUS; SUBCUTANEOUS at 16:16

## 2020-06-21 ASSESSMENT — ACTIVITIES OF DAILY LIVING (ADL)
ADLS_ACUITY_SCORE: 14
ADLS_ACUITY_SCORE: 18

## 2020-06-21 NOTE — PLAN OF CARE
Neuro: Pt non-verbal, does not respond to commands. No visual tracking. Restless, frequent movement of all extremities. Sitter at bedside.  Cardiac: 's-110's  Respiratory: Sating upper 90's on trach dome 21%. Suctioned q2-4h with minimal secretions overnight. Good strong cough.   GI/:  Incontinent. Loose BMs overnight. Condom cath replaced x1.   Diet/appetite: Tolerating TF @ 60 ml/hr with 100 FWF q2h for NA+.   Activity:  Assist of 2 for turns/repositioning. Frequently repositions self in bed. Pt boosted/repositioned frequently; Sezure pads and pillows in place.   Pain: No indicators of pain overnight.  Skin: No new deficits noted. Barrier cream applied to scrotal area.   LDA's: R TL PICC: Purple TKO + D5 good blood returns and flushes well. Gray lumen saline locked with positive blood return and flushes well. Red flushed poorly, no blood return noted, saline locked. G tube. Mitts on to help pt not pull at lines.  Plan: Continue with POC. Notify primary team with changes

## 2020-06-21 NOTE — PLAN OF CARE
Care provided 15:30 - 23:30     Neuro: Pt not verbal, does not respond to commands. Pupils PERRLA, no visual tracking tho seemed to focus on faces occasionally. Restless, frequent movement of all extremities. Sitter at bedside.  Cardiac: SR/ST 90's- low 100's. BP stable. Afebrile.             Respiratory: Sating  on trach dome 21% with humidification. Suctioned x3 with moderate secretions, tan/thick. Good cough, lung sounds coarse.   GI/: Loose incontinent BM X2. adequate urine output via condom cath.   Diet/appetite: Tolerating TF @ 60 ml/hr with 100 fwf q4h.   Activity:  Assist of 2 for turns/repositioning. frequently repositions self in bed. Pt boosted/repositioned frequently; Sezure pads and pillows in place.   Pain: Absence of nonverbal indicators of pain.  Skin: No new deficits noted. Barrier cream and miconazole applied to scrotal area.   LDA's: R TL PICC: Purple TKO + D5, Gray Heparin locked, Red occluded/unable to flush. G tube.      Plan: Re-check K and Mag in am. Continue with POC. Notify primary team with changes.

## 2020-06-21 NOTE — PROGRESS NOTES
Interventional Cardiology Progress Note      Assessment & Plan:  Scot Bishop is a 39 year old male with no known significant past medical history who presented to [facility] on 5/17/20 following a PEA/VF arrest and anterior STEMI. He was emergently taken to the cath lab and promptly placed on VA ECMO. Angiography revealed a thrombotic occlusion of the LAD successfully treated with aspiration thrombectomy and PCI w/NAZIA of proximal LAD.     Important dates  - Rewarmed 5/19  - De-cannulated 5/19  - IABP discontinued 5/20  - Trach 6/8   - PEG 6/9     # Hypoxic Brain injury  Initial CT head negative. Cooled to 34 degrees on arrival, rewarmed 5/19. EEG on 5/25 showed severe diffuse encephalopathy without seizures or epileptiform discharges. Repeat CTH on 5/25 showed multifocal acute/subacute cerebral infarcts. Brain MRI 5/26 showed multiple diffuse infarcts consistent with hypoxic brain injury. Concerns for LUE posturing and lack of LLE movement, repeat CTH 6/3 showed no acute changes. Trach placed 6/8. Moving all extremities spontaneously although nothing to command. Occasionally making eye contact and tracking at times. Remains 1:1 with sitter for safety concerns 2/2 to non-purposeful movements although not pulling at any lines.  - Continue Seroquel 100 qPM  - Wean 1:1 sitter     #Emesis  Recurrent emesis since PEG tube placement and TF. Abdominal xray and ultrasound unremarkable. Loose stools which are not new. Liver enzymes normal, T bili 0.4. Lipase 487 today. Placed NJ for relief, patient pulled out from non-purposeful movements. No obvious source for recurrent emesis on CT chest/abdomen/pelvis. Currently tolerating TF at goal 60 ml/hr. Scheduled zofran and reglan for relief. Plan to transition to G-J tube on 7/21. All non-essential meds were discontinued (metoprolol, lisinopril, lasix) on 6/18 to help aid with relief. No episodes of vomiting since 6/17. Tolerating TF at goal 60 ml/hr. All medications resumed 6/20,  tolerating well via PEG.   - Change scheduled zofran q6h to PRN  - Change reglan BID to PRN   - Protonix BID (for upper GIB which has now resolved)    # Refractory VF arrest s/p NAZIA to pLAD  # Sinus Tachycardia   Pt reportedly had chest pain that started on 5/16/20. He was using Drano on his pipes at home and did not initially seek medical attention for CP and SOB since it said on the box that Drano can cause those symptoms. He took a shower and had syncopal episode after coming out of the shower. EMS was called; initial rhythm asystole. With chest compressions pt eventually had PEA and then eventually had VF in the field. After multiple cycles of epinephrine had ROSC. He was intubated in the field. Pt was brought to [facility] ED where he regained a pulse and was brought to the Cath Lab for coronary angiogram. Initially, BP was 90s/60s as he was being transported from ED but he had recurrent cardiac arrest on arrival to Cath Lab. ECG showed ST elevation in aVR. He was promptly placed on VA ECMO. He had thrombotic occlusion of proximal LAD and underwent successful aspiration thrombectomy and PCI w/ NAZIA of proximal and mid LAD. Patient remains in NSR with rates in the 110-120's. Suspect ongoing tachycardia d/t evolving MI and post-arrest state. TTE 6/10 showed LVEF 36% with LAD territory akinesis, RV normal. Due to ongoing emesis, all non-essential meds were held 6/18. All medications resumed 6/20, tolerating well. Patient had 23 second run of VT overnight on 5/27. Monitoring QTc while on seroquel. QTc 467 6/21. Patient up 13 lbs in 3 days. Net + 1.6L 6/20. No signs of volume overload.   - Decannulated 5/19  - IABP discontinued 5/20  - Continue aspirin 81 mg and ticagrelor 90 mg BID  - Continue metoprolol tartrate 12.5 mg BID  - Continue lisinopril 5 mg daily   - Continue atorvastatin 10 mg daily   - Increase lasix 20 mg BID, consider increasing to 40 BID pending volume status    - Daily EKGs to monitor QTc while on  seroquel    # Aspiration pneumonia, improving   # Leukocytosis, improving   GNR on sputum cultures, blood cultures 6/1 grew GPCs, sputum cultures 6/2 grew non-lactose fermenting GNR. ID consulted for persistent fevers, re-cultured on 6/13 for low grade temp and elevated WBC, blood culture negative, sputum grew acinetobacter. Per ID, empirically treating with zosyn x 7 days (end date 6/20). Repeat c-diff and UA negative. CXR unchanged. CT negative for other source of infection. Currently afebrile, WBC nml today.   - Continue zosyn x 7 days (end date 6/20)  - Continue to monitor closely for signs of infection given lines     # Acute hypoxic respiratory failure, improving   Admission CT chest showed bilateral consolidations consistent with aspiration PNA. Patient experienced thick secretions that required bagging on 5/22. Bronchoscopy performed 5/21. Pulmonary was following, now signed off. Significant pressure injury of tongue from ET tube with splitting. CXR on 6/15 showed stable cervical subcutaneous emphysema. CT demonstrated crepitus extending to mediastinum and pectoralis, NTD. Trach placed 6/8, tolerating room air. Sputum culture grew GNR, vanc/zosyn added 6/4. Zosyn restarted 6/14 for ongoing low grade temp and elevated WBC, non-lactose fermenting GNR in sputum, zosyn re-started for 7-day course. 100.1 temp 6/18, re-cultured. WBC nml today. COVID negative.   - Continue zosyn for 7-day treatment (last dose 6/20)  - continue mucomyst and albuterol nebs PRN  - ENT was consulted for pressure injury of tongue, plan for follow up as OP for possible surgical repair     # Elevated lipase  Lipase 487 today. CT abdomen/chest/pelvis and abdominal ultrasound unremarkable. Likely acute phase reactant. No s/s of acute pancreatitis.   - Continue to monitor closely    Resolved hospital problems  # Hypernatremia, resolved   Na levels 149 on 6/17. Now improving with Na 137 today.  - Decrease FWF to 60 ml q4h  - Will stop D5  "infusion  - Daily BMP    # Acute kidney injury, resolved    Cr 0.93, BUN 22 today. Renal function stable  - Continue  ml q2h  - Monitor I/O's  - Replace lytes per protocol    # Thrombocytosis, resolved   Receiving aspirin and ticagrelor for NAZIA. Transfused 1 unit PRBCs 5/21. Plts 258 and Hgb 10 today. No signs of active bleeding. Right inguinal hematoma on US at ECMO cannulae insertion site, ongoing serosanguinous oozing from lateral aspect of site.   - Continue to monitor     # Shock liver, resolved   LFTs normal      Prophylaxis: subcutaneous heparin  GI: Protonix  DVT: see above    Diet: TF at 60 ml/hr   Activity: full assist  Code Status: Full   Disposition: [facility] pending bed placement. Barriers include 1:1 sitter.     Patient seen and discussed w/ Dr. Alanis.      Roxanna Mukherjee DNP, APRN, CNP  [facility]  Interventional Cardiology  [phone]      Interval History:  No acute events overnight. No episodes of emesis since 6/17. Tolerating TF at goal. Na improving. Non-purposeful movements. 1:1 sitter. Trach intact, stable on trach dome 21%. VSS. Good urine output via condom cath.     Most recent vital signs:  /74 (BP Location: Left arm)   Pulse 108   Temp 97.5  F (36.4  C) (Axillary)   Resp 16   Ht 1.676 m (5' 6\")   Wt 86.8 kg (191 lb 5.8 oz)   SpO2 99%   BMI 30.89 kg/m    Temp:  [97.4  F (36.3  C)-98.2  F (36.8  C)] 97.5  F (36.4  C)  Pulse:  [107-108] 108  Heart Rate:  [] 101  Resp:  [16-18] 16  BP: ()/(66-79) 96/66  FiO2 (%):  [21 %] 21 %  SpO2:  [98 %-100 %] 100 %  Wt Readings from Last 2 Encounters:   06/20/20 86.8 kg (191 lb 5.8 oz)       Intake/Output Summary (Last 24 hours) at 6/19/2020 0903  Last data filed at 6/19/2020 0700  Gross per 24 hour   Intake 2625.25 ml   Output 750 ml   Net 1875.25 ml       Physical exam:  General: In bed, in NAD  HEENT: EOMI, PERRLA, no scleral icterus or injection  CARDIAC: RRR, no m/r/g appreciated. " Peripheral pulses 2+  RESP: CTAB, no wheezes, rhonchi or crackles appreciated. Trach dome.   GI: NABS, NT/ND, no guarding or rebound  : condom cath in place  EXTREMITIES: NO LE edema, pulses 2+. Femoral access site w/o bleeding, dressing c/d/i. No bruits appreciated.   SKIN: No acute lesions appreciated. Lines CDI  NEURO: Non-purposeful movements. PERRLA. Moving extremities and opening eyes, although nothing to command. Withdraws to pain. Occasionally making eye contact and tracking.     Drains and Tubes: All drain and tube sites are all benign, no signs of infection  Access: PICC line     Labs (Past three days):  CBC  Recent Labs   Lab 06/21/20  0323 06/20/20  0352 06/19/20  0449 06/18/20  0541   WBC 9.4 9.4 10.4 8.7   RBC 3.27* 3.34* 3.40* 3.37*   HGB 9.4* 9.9* 10.1* 9.9*   HCT 31.7* 32.8* 33.6* 33.6*   MCV 97 98 99 100   MCH 28.7 29.6 29.7 29.4   MCHC 29.7* 30.2* 30.1* 29.5*   RDW 13.7 14.0 14.0 14.0    215 258 281     BMP  Recent Labs   Lab 06/21/20  0323 06/20/20  1548 06/20/20  1317 06/20/20  0352 06/19/20  1636 06/19/20  1206 06/19/20  0449  06/18/20  1631  06/18/20  0541  06/17/20  0331    141  --  144 145* 146* 147*  148*   < > 150*   < > 148*   < > 146*   POTASSIUM 3.5  --  3.7 3.3*  --  3.6 3.3*  --  3.4   < > 3.4   < > 3.5   CHLORIDE 106  --   --  113*  --   --  116*  --  117*   < > 115*   < > 112*   CO2 25  --   --  25  --   --  26  --  26   < > 27   < > 30   ANIONGAP 7  --   --  6  --   --  5  --  8   < > 6   < > 5   *  --   --  119*  --   --  115*  --  103*   < > 94   < > 93   BUN 22  --   --  22  --   --  21  --  21   < > 22   < > 22   CR 0.93  --   --  1.03  --   --  1.18  --  1.24   < > 1.15   < > 1.11   GFRESTIMATED >90  --   --  90  --   --  76  --  72   < > 79   < > 82   GFRESTBLACK >90  --   --  >90  --   --  88  --  83   < > >90   < > >90   TEN 8.6  --   --  9.0  --   --  9.3  --  9.6   < > 9.6   < > 9.6   MAG 2.0  --   --  1.9  --   --  2.1  --   --   --  2.1  --  2.3    PHOS  --   --   --  3.6  --   --  2.8  --   --   --  3.7  --  4.5    < > = values in this interval not displayed.     Troponins:   Lab Results   Component Value Date    TROPI 0.099 (H) 06/01/2020    TROPI 0.153 (HH) 05/31/2020    TROPI 0.218 (HH) 05/30/2020    TROPI 0.348 (HH) 05/29/2020    TROPI 0.552 (HH) 05/28/2020        INRNo lab results found in last 7 days.  Liver panel  Recent Labs   Lab 06/18/20  1158   PROTTOTAL 8.1   ALBUMIN 3.0*   BILITOTAL 0.4   ALKPHOS 119   AST 31   ALT 54       Imaging/procedure results:  EKG 12 Lead: 6/19/20      ECHO: 6/10/20  Interpretation Summary  LVEF 36% based on biplane 2D tracing.  LAD territory akinesis.  Right ventricular function, chamber size, wall motion, and thickness are  normal.  The inferior vena cava is normal.  No pericardial effusion is present.  There has been no change.    5/26/20  Interpretation Summary  Limited study to evaluate biventricular function.     Left ventricular size is normal. The Ejection Fraction is estimated at 35-40%.  The mid to distal anterolateral wall, the apex and distal septum is akinetic  consistent with LAD territory injury.  Right ventricular function, chamber size, wall motion, and thickness are  normal.  No pericardial effusion is present.     Compared to prior study EF appears grossly similar.    Coronary Angiogram: 5/17/20  Conclusion     Acute anterolateral myocardial infarction, with out of hospital cardiac arrest, ROSC, and recurrent arrest on arrival to cath lab    Single vessel CAD with thrombotic occlusion of proximal LAD    Successful aspiration thrombectomy of the proximal LAD    Successful stenting of proximal-mid LAD with NAZIA (3.0x20 Synergy), post-dilation to 4.0 mm guided by IVUS.    Successful POBA (with final kissing balloon) of ostial D1    Cardiac resuscitation with inotropic therapy    CPR    Successful placement of periperhal V-A ECMO with Cardiohelp (RFA, RFV)    Successful placement of Zoll Quattro cooling  catheter for Thermoguard hypothermia    Successful placement of RT radial arterial line    IVUS was performed on the proximal/mid LAD post stenting    Successful insertion of distal antegrade Flex sheath into RT SFA          I have seen and examined the patient with the [facility] team. I agree with the assessment and plan of the note above.I have reviewed pertinent labs.     Jefferson Alanis MD  Interventional Cardiology  Pager: [phone]

## 2020-06-21 NOTE — PLAN OF CARE
Care Provided:     Temp: 97.1  F (36.2  C) Temp src: Axillary BP: 92/66 Pulse: 108 Heart Rate: 98 Resp: 16 SpO2: 99 % O2 Device: Trach dome 21%    Neuro: Alert, PERRLA-4/5. No tracking, does not follow commands; responds to pain. Restless, calm; labile. Sitter at bedside for safety.   Cardiac: SR/Stach 90-100s. BP stable. Afebrile.   Respiratory: Shiley 6 trach. Sating >97% on 21% trach dome. Good, productive cough; inline suction per nursing x2 on shift.   GI: TF per PEG @ 60cc/hr with 60cc/4hr FWF. Loose stool; order for immodium obtained in afternoon in hopes to control dirrhead and pain related. Pt noted to become agitated after BMs, but rather calm between cares.   : Adequate urine output; incontinent with condom cath intact. BM X1  Activity: Bedrest; repositioning Q2h and PRN for pressure relief.   Pain: Absence of nonverbal indicators at rest. Pt grimace and muscle reactions during perineal cares indicative of pain. Attempting to control dirrhea; soft cloths with lotion used for cleaning. Scrotom with cream applied and wrapped with soft cloth 'sling-like' (attempted in afternoon once correlation recognized with agitation & BMs) appears to make patient more comfortable.    Skin: See flowsheets for documentation. No new deficits noted on shift.   Wound 05/27/20 Right;Midline Tongue Ulceration;Suspected pressure ulcer Mid/right tongue ulceration/cut (Active)   Site Assessment Moist;Pink;Red 06/21/20 1600   Shannan-wound Assessment WDL 06/21/20 1600   Drainage Amount None 06/21/20 1600   Wound Care/Cleansing Other (Comment) 06/21/20 1600   Dressing Open to air 06/21/20 1600       Wound 05/27/20 Left;Posterior Heel Other (comment) (Active)   Site Assessment Pink;Clean, dry, intact 06/21/20 1600   Shannan-wound Assessment WDL 06/21/20 0900   Drainage Amount None 06/21/20 1600   Dressing Open to air 06/21/20 1600   Dressing Status Other (Comment) 06/21/20 0400       Wound 06/14/20 Right Other (Comment) Suspected  pressure ulcer (Active)   Site Assessment Pink;Scabbing 06/21/20 1600   Shannan-wound Assessment Erythema 06/21/20 1600   Drainage Amount None 06/21/20 1600   Dressing Foam 06/21/20 1600   Dressing Status New dressing 06/21/20 1600       Incision/Surgical Site 05/19/20 Right Groin (Active)   Incision Assessment UTV 06/21/20 1600   Shannan-Incision Assessment Edema;Erythema 06/21/20 1300   Closure Approximated 06/21/20 1300   Incision Drainage Amount None 06/21/20 1300   Dressing Intervention Clean, dry, intact 06/21/20 1600     LDAs:    - PICC removed on shift  - PIV: saline locked.     Plan: Will continue to monitor pt closely and notify primary team with any changes.      Problem: Adult Inpatient Plan of Care  Goal: Plan of Care Review  6/21/2020 1837 by Faye Jauregui, RN  Outcome: No Change     Problem: Adult Inpatient Plan of Care  Goal: Readiness for Transition of Care  6/21/2020 1837 by Faye Jauregui, RN  Outcome: Improving     Problem: Airway Clearance Ineffective  Goal: Effective Airway Clearance  6/21/2020 1837 by Faye Jauregui, RN  Outcome: Improving     Problem: Confusion Acute  Goal: Optimal Cognitive Function  6/21/2020 1837 by Faye Jauregui, RN  Outcome: No Change

## 2020-06-22 LAB
ANION GAP SERPL CALCULATED.3IONS-SCNC: 8 MMOL/L (ref 3–14)
BUN SERPL-MCNC: 26 MG/DL (ref 7–30)
CALCIUM SERPL-MCNC: 9.1 MG/DL (ref 8.5–10.1)
CHLORIDE SERPL-SCNC: 106 MMOL/L (ref 94–109)
CO2 SERPL-SCNC: 24 MMOL/L (ref 20–32)
CREAT SERPL-MCNC: 0.9 MG/DL (ref 0.66–1.25)
ERYTHROCYTE [DISTWIDTH] IN BLOOD BY AUTOMATED COUNT: 13.9 % (ref 10–15)
GFR SERPL CREATININE-BSD FRML MDRD: >90 ML/MIN/{1.73_M2}
GLUCOSE SERPL-MCNC: 107 MG/DL (ref 70–99)
HCT VFR BLD AUTO: 36.7 % (ref 40–53)
HGB BLD-MCNC: 11.1 G/DL (ref 13.3–17.7)
INTERPRETATION ECG - MUSE: NORMAL
INTERPRETATION ECG - MUSE: NORMAL
LIPASE SERPL-CCNC: 383 U/L (ref 73–393)
MAGNESIUM SERPL-MCNC: 1.9 MG/DL (ref 1.6–2.3)
MCH RBC QN AUTO: 29.5 PG (ref 26.5–33)
MCHC RBC AUTO-ENTMCNC: 30.2 G/DL (ref 31.5–36.5)
MCV RBC AUTO: 98 FL (ref 78–100)
PLATELET # BLD AUTO: 246 10E9/L (ref 150–450)
POTASSIUM SERPL-SCNC: 3.7 MMOL/L (ref 3.4–5.3)
RBC # BLD AUTO: 3.76 10E12/L (ref 4.4–5.9)
SODIUM SERPL-SCNC: 138 MMOL/L (ref 133–144)
WBC # BLD AUTO: 10.9 10E9/L (ref 4–11)

## 2020-06-22 PROCEDURE — 85027 COMPLETE CBC AUTOMATED: CPT | Performed by: INTERNAL MEDICINE

## 2020-06-22 PROCEDURE — 36415 COLL VENOUS BLD VENIPUNCTURE: CPT | Performed by: INTERNAL MEDICINE

## 2020-06-22 PROCEDURE — 80048 BASIC METABOLIC PNL TOTAL CA: CPT | Performed by: INTERNAL MEDICINE

## 2020-06-22 PROCEDURE — 25000132 ZZH RX MED GY IP 250 OP 250 PS 637: Performed by: STUDENT IN AN ORGANIZED HEALTH CARE EDUCATION/TRAINING PROGRAM

## 2020-06-22 PROCEDURE — 40000275 ZZH STATISTIC RCP TIME EA 10 MIN

## 2020-06-22 PROCEDURE — 12000004 ZZH R&B IMCU UMMC

## 2020-06-22 PROCEDURE — 27210437 ZZH NUTRITION PRODUCT SEMIELEM INTERMED LITER

## 2020-06-22 PROCEDURE — 83735 ASSAY OF MAGNESIUM: CPT | Performed by: INTERNAL MEDICINE

## 2020-06-22 PROCEDURE — 25000128 H RX IP 250 OP 636: Performed by: STUDENT IN AN ORGANIZED HEALTH CARE EDUCATION/TRAINING PROGRAM

## 2020-06-22 PROCEDURE — 25000132 ZZH RX MED GY IP 250 OP 250 PS 637: Performed by: INTERNAL MEDICINE

## 2020-06-22 PROCEDURE — 83690 ASSAY OF LIPASE: CPT | Performed by: INTERNAL MEDICINE

## 2020-06-22 PROCEDURE — 25000132 ZZH RX MED GY IP 250 OP 250 PS 637

## 2020-06-22 PROCEDURE — 40000047 ZZH STATISTIC CTO2 CONT OXYGEN TECH TIME EA 90 MIN

## 2020-06-22 PROCEDURE — 25000132 ZZH RX MED GY IP 250 OP 250 PS 637: Performed by: NURSE PRACTITIONER

## 2020-06-22 PROCEDURE — 40000983 ZZH STATISTIC HFNC ADULT NON-CPAP

## 2020-06-22 PROCEDURE — 99232 SBSQ HOSP IP/OBS MODERATE 35: CPT | Performed by: PHYSICIAN ASSISTANT

## 2020-06-22 RX ADMIN — MELATONIN TAB 3 MG 10 MG: 3 TAB at 21:57

## 2020-06-22 RX ADMIN — CHLORHEXIDINE GLUCONATE 0.12% ORAL RINSE 15 ML: 1.2 LIQUID ORAL at 17:01

## 2020-06-22 RX ADMIN — CHLORHEXIDINE GLUCONATE 0.12% ORAL RINSE 15 ML: 1.2 LIQUID ORAL at 08:14

## 2020-06-22 RX ADMIN — Medication 40 MG: at 20:56

## 2020-06-22 RX ADMIN — LEVETIRACETAM 1000 MG: 100 SOLUTION ORAL at 08:14

## 2020-06-22 RX ADMIN — ATORVASTATIN CALCIUM 10 MG: 10 TABLET, FILM COATED ORAL at 20:56

## 2020-06-22 RX ADMIN — MICONAZOLE NITRATE: 20 POWDER TOPICAL at 08:28

## 2020-06-22 RX ADMIN — TICAGRELOR 90 MG: 90 TABLET ORAL at 08:15

## 2020-06-22 RX ADMIN — HEPARIN SODIUM 5000 UNITS: 5000 INJECTION, SOLUTION INTRAVENOUS; SUBCUTANEOUS at 08:14

## 2020-06-22 RX ADMIN — TICAGRELOR 90 MG: 90 TABLET ORAL at 20:56

## 2020-06-22 RX ADMIN — Medication 12.5 MG: at 20:56

## 2020-06-22 RX ADMIN — LISINOPRIL 5 MG: 5 TABLET ORAL at 08:14

## 2020-06-22 RX ADMIN — LEVETIRACETAM 1000 MG: 100 SOLUTION ORAL at 20:56

## 2020-06-22 RX ADMIN — CHLORHEXIDINE GLUCONATE 0.12% ORAL RINSE 15 ML: 1.2 LIQUID ORAL at 20:56

## 2020-06-22 RX ADMIN — MICONAZOLE NITRATE: 20 POWDER TOPICAL at 20:57

## 2020-06-22 RX ADMIN — Medication 40 MG: at 08:14

## 2020-06-22 RX ADMIN — QUETIAPINE FUMARATE 100 MG: 50 TABLET ORAL at 21:57

## 2020-06-22 RX ADMIN — HEPARIN SODIUM 5000 UNITS: 5000 INJECTION, SOLUTION INTRAVENOUS; SUBCUTANEOUS at 17:01

## 2020-06-22 RX ADMIN — ASPIRIN 81 MG CHEWABLE TABLET 81 MG: 81 TABLET CHEWABLE at 08:14

## 2020-06-22 RX ADMIN — Medication 12.5 MG: at 08:14

## 2020-06-22 RX ADMIN — FUROSEMIDE 20 MG: 20 TABLET ORAL at 08:14

## 2020-06-22 RX ADMIN — CHLORHEXIDINE GLUCONATE 0.12% ORAL RINSE 15 ML: 1.2 LIQUID ORAL at 13:32

## 2020-06-22 RX ADMIN — FUROSEMIDE 20 MG: 20 TABLET ORAL at 17:01

## 2020-06-22 ASSESSMENT — ACTIVITIES OF DAILY LIVING (ADL)
ADLS_ACUITY_SCORE: 12
ADLS_ACUITY_SCORE: 14
ADLS_ACUITY_SCORE: 14
ADLS_ACUITY_SCORE: 12
ADLS_ACUITY_SCORE: 12
ADLS_ACUITY_SCORE: 14

## 2020-06-22 NOTE — PLAN OF CARE
Neuro: Patient awake/alert. Pupils round and reactive.  Moving all extremities, but not on command.  Slight tracking noted occassionally. Mute.  Cardiac: SR-ST -110. VSS.   Respiratory: Sating above 90% on trach dome at 21% FIO2.  Suction 1x during shift.  Strong cough per self.  Lung sounds clear/diminished.  Trach Jessicaley #6. Trach cares completed.  Foam trach times changed.  GI/: Adequate urine output via condom cath.  Monitor urine output r/t to PO lasix. No BM noted during shift.  Emesis 1x post suctioning.   Diet/appetite: Tolerating TF diet at 60 ml/hr and 60 ml water flush Q 4 hr. NPO  Activity:  Assist of Q 2 hr with repositioning in bed.  Patient makes frequent shift changes in bed. Soft hand mitts in place r/t to frequent arm movements/pulling at lines.  Out of bed with mech lift.  Pain: At acceptable level on current regimen. Pt does not display non-verbal s/sx of pain.   Skin: No new deficits noted. Several skin issues noted (see Flowsheet).    LDA's: Left PIV-SL.  G-tube for tube feed.  Condom cath in place.  Trach on trach dome. PICC removed on 6/21    Plan: Continue with POC. Notify primary team with changes.

## 2020-06-22 NOTE — PROGRESS NOTES
Interventional Cardiology Progress Note      Assessment & Plan:  Scot Bishop is a 39 year old male with no known significant past medical history who presented to [facility] on 5/17/20 following a PEA/VF arrest and anterior STEMI. He was emergently taken to the cath lab and promptly placed on [facility] ECMO. Angiography revealed a thrombotic occlusion of the LAD successfully treated with aspiration thrombectomy and PCI w/NAZIA of proximal LAD.     Important dates  - Rewarmed 5/19  - De-cannulated 5/19  - IABP discontinued 5/20  - Trach 6/8   - PEG 6/9     # Hypoxic Brain injury  Initial CT head negative. Cooled to 34 degrees on arrival, rewarmed 5/19. EEG on 5/25 showed severe diffuse encephalopathy without seizures or epileptiform discharges. Repeat CTH on 5/25 showed multifocal acute/subacute cerebral infarcts. Brain MRI 5/26 showed multiple diffuse infarcts consistent with hypoxic brain injury. Concerns for LUE posturing and lack of LLE movement, repeat CTH 6/3 showed no acute changes. Trach placed 6/8. Moving all extremities spontaneously although nothing to command. Occasionally making eye contact and tracking at times. Remains 1:1 with sitter for safety concerns 2/2 to non-purposeful movements although not pulling at any lines.   - Continue Seroquel 100 qPM  - Wean 1:1 sitter     #Emesis  Recurrent emesis since PEG tube placement and TF. Abdominal xray and ultrasound unremarkable. Loose stools which are not new. Liver enzymes normal, T bili 0.4. Lipase 487 today. Placed NJ for relief, patient pulled out from non-purposeful movements. No obvious source for recurrent emesis on CT chest/abdomen/pelvis. Currently tolerating TF at goal 60 ml/hr. Scheduled zofran and reglan for relief. Plan to transition to G-J tube on 7/21. All non-essential meds were discontinued (metoprolol, lisinopril, lasix) on 6/18 to help aid with relief. No episodes of vomiting since 6/17. Tolerating TF at goal 60 ml/hr. All medications resumed 6/20,  tolerating well via PEG.   - zofran q6h to PRN  - Change reglan BID to PRN   - Protonix BID (for upper GIB which has now resolved)    # Refractory VF arrest s/p NAZIA to pLAD  # Sinus Tachycardia   Pt reportedly had chest pain that started on 5/16/20. He was using Drano on his pipes at home and did not initially seek medical attention for CP and SOB since it said on the box that Drano can cause those symptoms. He took a shower and had syncopal episode after coming out of the shower. EMS was called; initial rhythm asystole. With chest compressions pt eventually had PEA and then eventually had VF in the field. After multiple cycles of epinephrine had ROSC. He was intubated in the field. Pt was brought to [facility] ED where he regained a pulse and was brought to the Cath Lab for coronary angiogram. Initially, BP was 90s/60s as he was being transported from ED but he had recurrent cardiac arrest on arrival to Cath Lab. ECG showed ST elevation in aVR. He was promptly placed on VA ECMO. He had thrombotic occlusion of proximal LAD and underwent successful aspiration thrombectomy and PCI w/ NAZIA of proximal and mid LAD. Patient remains in NSR with rates in the 110-120's. Suspect ongoing tachycardia d/t evolving MI and post-arrest state. TTE 6/10 showed LVEF 36% with LAD territory akinesis, RV normal. Due to ongoing emesis, all non-essential meds were held 6/18. All medications resumed 6/20, tolerating well. Patient had 23 second run of VT overnight on 5/27. Monitoring QTc while on seroquel. QTc 467 6/21. Has been net + in recent days, but no signs of volume overload.   - Decannulated 5/19  - IABP discontinued 5/20  - Continue aspirin 81 mg and ticagrelor 90 mg BID  - Continue metoprolol tartrate 12.5 mg BID  - Continue lisinopril 5 mg daily   - Continue atorvastatin 10 mg daily   - lasix 20 mg BID    - Daily EKGs to monitor QTc while on seroquel    # Aspiration pneumonia - resolved   # Leukocytosis - resolved  GNR on sputum  cultures, blood cultures 6/1 grew GPCs, sputum cultures 6/2 grew non-lactose fermenting GNR. ID consulted for persistent fevers, re-cultured on 6/13 for low grade temp and elevated WBC, blood culture negative, sputum grew acinetobacter. Per ID, empirically treating with zosyn x 7 days (end date 6/20). Repeat c-diff and UA negative. CXR unchanged. CT negative for other source of infection. Currently afebrile, WBC nml today.   - s/p 7 days of zosyn (end date 6/20)  - Continue to monitor closely for signs of infection given lines     # Acute hypoxic respiratory failure, improving   Admission CT chest showed bilateral consolidations consistent with aspiration PNA. Patient experienced thick secretions that required bagging on 5/22. Bronchoscopy performed 5/21. Pulmonary was following, now signed off. Significant pressure injury of tongue from ET tube with splitting. CXR on 6/15 showed stable cervical subcutaneous emphysema. CT demonstrated crepitus extending to mediastinum and pectoralis, NTD. Trach placed 6/8, tolerating room air. Sputum culture grew GNR, vanc/zosyn added 6/4. Zosyn restarted 6/14 for ongoing low grade temp and elevated WBC, non-lactose fermenting GNR in sputum, zosyn re-started for 7-day course. 100.1 temp 6/18, re-cultured. WBC nml today. COVID negative.   - zosyn as above  - continue mucomyst and albuterol nebs PRN  - ENT was consulted for pressure injury of tongue, plan for follow up as OP for possible surgical repair     # Elevated lipase - improving  Lipase 487 6/21. CT abdomen/chest/pelvis and abdominal ultrasound unremarkable. Likely acute phase reactant. No s/s of acute pancreatitis.   - Continue to monitor closely    Resolved hospital problems  # Hypernatremia, resolved   Na levels 149 on 6/17. Now improving with Na 137 today.  - Decrease FWF to 60 ml q4h  - Will stop D5 infusion  - Daily BMP    # Acute kidney injury, resolved    Cr 0.93, BUN 22 today. Renal function stable  - Continue   "ml q2h  - Monitor I/O's  - Replace lytes per protocol    # Thrombocytosis, resolved   Receiving aspirin and ticagrelor for NAZIA. Transfused 1 unit PRBCs 5/21. Plts 258 and Hgb 10 today. No signs of active bleeding. Right inguinal hematoma on US at ECMO cannulae insertion site, ongoing serosanguinous oozing from lateral aspect of site.   - Continue to monitor     # Shock liver, resolved   LFTs normal      Prophylaxis: subcutaneous heparin  GI: Protonix  DVT: see above    Diet: TF at 60 ml/hr   Activity: full assist  Code Status: Full   Disposition: [facility] pending bed placement. Barriers include 1:1 sitter.     Patient discussed w/ Dr. Alanis.      Betito Sanders PA-C  [facility] Cardiology Team        Interval History:    - no acute events overnight. Remains on 1:1 sitter for non-purposeful movement and c/f catching lines/trach  - attends his eyes when talking to him, but otherwise not following commands or making purposeful movements. Frequent yawning on exam    Most recent vital signs:  BP 93/71 (BP Location: Left arm)   Pulse 104   Temp 97  F (36.1  C) (Axillary)   Resp 16   Ht 1.676 m (5' 6\")   Wt 86.8 kg (191 lb 5.8 oz)   SpO2 100%   BMI 30.89 kg/m    Temp:  [96.6  F (35.9  C)-97.9  F (36.6  C)] 97  F (36.1  C)  Pulse:  [] 104  Heart Rate:  [] 104  Resp:  [16-18] 16  BP: (92-98)/(58-71) 93/71  FiO2 (%):  [21 %] 21 %  SpO2:  [99 %-100 %] 100 %  Wt Readings from Last 2 Encounters:   06/20/20 86.8 kg (191 lb 5.8 oz)       Intake/Output Summary (Last 24 hours) at 6/22/2020 0846  Last data filed at 6/22/2020 0822  Gross per 24 hour   Intake 2280 ml   Output 2275 ml   Net 5 ml       Physical exam:  General: In bed, in NAD  HEENT: EOMI, PERRLA, no scleral icterus or injection  CARDIAC: RRR, no m/r/g appreciated. Peripheral pulses 2+  RESP: CTAB, no wheezes, rhonchi or crackles appreciated. Trach dome.   GI: NABS, NT/ND, no guarding or rebound  : condom cath in place, no scrotal excoriation " noted  EXTREMITIES: NO LE edema, pulses 2+. Femoral access site w/o bleeding, dressing c/d/i. No bruits appreciated.   SKIN: No acute lesions appreciated. Lines CDI  NEURO: Non-purposeful movements. Moving extremities and opening eyes, although nothing to command. Withdraws to pain. Occasionally making eye contact and tracking.     Drains and Tubes: All drain and tube sites are all benign, no signs of infection  Access: PICC line     Labs (Past three days):  CBC  Recent Labs   Lab 06/22/20  0442 06/21/20  0323 06/20/20  0352 06/19/20  0449   WBC 10.9 9.4 9.4 10.4   RBC 3.76* 3.27* 3.34* 3.40*   HGB 11.1* 9.4* 9.9* 10.1*   HCT 36.7* 31.7* 32.8* 33.6*   MCV 98 97 98 99   MCH 29.5 28.7 29.6 29.7   MCHC 30.2* 29.7* 30.2* 30.1*   RDW 13.9 13.7 14.0 14.0    214 215 258     BMP  Recent Labs   Lab 06/22/20  0442 06/21/20  1555 06/21/20  0323 06/20/20  1548 06/20/20  1317 06/20/20  0352  06/19/20  0449  06/18/20  0541  06/17/20  0331    138 137 141  --  144   < > 147*  148*   < > 148*   < > 146*   POTASSIUM 3.7  --  3.5  --  3.7 3.3*   < > 3.3*   < > 3.4   < > 3.5   CHLORIDE 106  --  106  --   --  113*  --  116*   < > 115*   < > 112*   CO2 24  --  25  --   --  25  --  26   < > 27   < > 30   ANIONGAP 8  --  7  --   --  6  --  5   < > 6   < > 5   *  --  171*  --   --  119*  --  115*   < > 94   < > 93   BUN 26  --  22  --   --  22  --  21   < > 22   < > 22   CR 0.90  --  0.93  --   --  1.03  --  1.18   < > 1.15   < > 1.11   GFRESTIMATED >90  --  >90  --   --  90  --  76   < > 79   < > 82   GFRESTBLACK >90  --  >90  --   --  >90  --  88   < > >90   < > >90   TEN 9.1  --  8.6  --   --  9.0  --  9.3   < > 9.6   < > 9.6   MAG 1.9  --  2.0  --   --  1.9  --  2.1  --  2.1  --  2.3   PHOS  --   --   --   --   --  3.6  --  2.8  --  3.7  --  4.5    < > = values in this interval not displayed.     Troponins:   Lab Results   Component Value Date    TROPI 0.099 (H) 06/01/2020    TROPI 0.153 (HH) 05/31/2020    TROPI  0.218 () 05/30/2020    TROPI 0.348 (HH) 05/29/2020    TROPI 0.552 () 05/28/2020        INRNo lab results found in last 7 days.  Liver panel  Recent Labs   Lab 06/18/20  1158   PROTTOTAL 8.1   ALBUMIN 3.0*   BILITOTAL 0.4   ALKPHOS 119   AST 31   ALT 54       Imaging/procedure results:  EKG 12 Lead: 6/19/20      ECHO: 6/10/20  Interpretation Summary  LVEF 36% based on biplane 2D tracing.  LAD territory akinesis.  Right ventricular function, chamber size, wall motion, and thickness are  normal.  The inferior vena cava is normal.  No pericardial effusion is present.  There has been no change.    5/26/20  Interpretation Summary  Limited study to evaluate biventricular function.     Left ventricular size is normal. The Ejection Fraction is estimated at 35-40%.  The mid to distal anterolateral wall, the apex and distal septum is akinetic  consistent with LAD territory injury.  Right ventricular function, chamber size, wall motion, and thickness are  normal.  No pericardial effusion is present.     Compared to prior study EF appears grossly similar.    Coronary Angiogram: 5/17/20  Conclusion     Acute anterolateral myocardial infarction, with out of hospital cardiac arrest, ROSC, and recurrent arrest on arrival to cath lab    Single vessel CAD with thrombotic occlusion of proximal LAD    Successful aspiration thrombectomy of the proximal LAD    Successful stenting of proximal-mid LAD with NAZIA (3.0x20 Synergy), post-dilation to 4.0 mm guided by IVUS.    Successful POBA (with final kissing balloon) of ostial D1    Cardiac resuscitation with inotropic therapy    CPR    Successful placement of periperhal V-A ECMO with Cardiohelp (RFA, RFV)    Successful placement of Zoll Quattro cooling catheter for Thermoguard hypothermia    Successful placement of RT radial arterial line    IVUS was performed on the proximal/mid LAD post stenting    Successful insertion of distal antegrade Flex sheath into RT SFA

## 2020-06-22 NOTE — PLAN OF CARE
"Neuro: Pt withdraws from painful stimuli.Pt has been less restless overnight. Less movement with hands and arms.  Still moving feet quite frequently.   Cardiac: ST. VSS.  BP 92/58 (BP Location: Left arm)   Pulse 99   Temp 96.6  F (35.9  C) (Axillary)   Resp 16   Ht 1.676 m (5' 6\")   Wt 86.8 kg (191 lb 5.8 oz)   SpO2 100%   BMI 30.89 kg/m       Respiratory: Sating in high 90's on 21% TD.  Suctioned twice on this shift. Shiley 6.0 trach, cares done at 0400.  GI/: Adequate urine output. Imodium given once.  Diet/appetite: Tolerating continuous TF at 60 ml/hr and 60 ml FWF q 4 hours . Tolerating meds, no signs of nausea, no emesis.  Activity:  Moves extremities frequently while in bed.  Prevalon boots for heel protection. Pulsate Mattress in place.  Weight shifting assistance provided.  Pain: No physiological or overt signs of pain.   Skin: No new deficits noted.  Bottom and groin folds reddened. Diligent pericare with alyssa spray, dry wipes and light Barrier Cream.  Miconazole for groins, scrotum.  LDA's:  L PIV saline locked, condom cath, Shiley 6.0 trach.     Plan: Continue with POC. Notify primary team with changes.        "

## 2020-06-23 LAB
ANION GAP SERPL CALCULATED.3IONS-SCNC: 6 MMOL/L (ref 3–14)
BUN SERPL-MCNC: 29 MG/DL (ref 7–30)
CALCIUM SERPL-MCNC: 9.7 MG/DL (ref 8.5–10.1)
CHLORIDE SERPL-SCNC: 103 MMOL/L (ref 94–109)
CO2 SERPL-SCNC: 27 MMOL/L (ref 20–32)
CREAT SERPL-MCNC: 0.78 MG/DL (ref 0.66–1.25)
ERYTHROCYTE [DISTWIDTH] IN BLOOD BY AUTOMATED COUNT: 14.1 % (ref 10–15)
GFR SERPL CREATININE-BSD FRML MDRD: >90 ML/MIN/{1.73_M2}
GLUCOSE SERPL-MCNC: 107 MG/DL (ref 70–99)
HCT VFR BLD AUTO: 37.2 % (ref 40–53)
HGB BLD-MCNC: 11.3 G/DL (ref 13.3–17.7)
MCH RBC QN AUTO: 29.3 PG (ref 26.5–33)
MCHC RBC AUTO-ENTMCNC: 30.4 G/DL (ref 31.5–36.5)
MCV RBC AUTO: 96 FL (ref 78–100)
PLATELET # BLD AUTO: 280 10E9/L (ref 150–450)
POTASSIUM SERPL-SCNC: 3.8 MMOL/L (ref 3.4–5.3)
RBC # BLD AUTO: 3.86 10E12/L (ref 4.4–5.9)
SODIUM SERPL-SCNC: 136 MMOL/L (ref 133–144)
WBC # BLD AUTO: 11.2 10E9/L (ref 4–11)

## 2020-06-23 PROCEDURE — 12000004 ZZH R&B IMCU UMMC

## 2020-06-23 PROCEDURE — 80048 BASIC METABOLIC PNL TOTAL CA: CPT | Performed by: INTERNAL MEDICINE

## 2020-06-23 PROCEDURE — 25000132 ZZH RX MED GY IP 250 OP 250 PS 637: Performed by: NURSE PRACTITIONER

## 2020-06-23 PROCEDURE — 40000047 ZZH STATISTIC CTO2 CONT OXYGEN TECH TIME EA 90 MIN

## 2020-06-23 PROCEDURE — 99232 SBSQ HOSP IP/OBS MODERATE 35: CPT | Performed by: PHYSICIAN ASSISTANT

## 2020-06-23 PROCEDURE — 25000132 ZZH RX MED GY IP 250 OP 250 PS 637: Performed by: INTERNAL MEDICINE

## 2020-06-23 PROCEDURE — 25000132 ZZH RX MED GY IP 250 OP 250 PS 637: Performed by: STUDENT IN AN ORGANIZED HEALTH CARE EDUCATION/TRAINING PROGRAM

## 2020-06-23 PROCEDURE — 40000275 ZZH STATISTIC RCP TIME EA 10 MIN

## 2020-06-23 PROCEDURE — 25000132 ZZH RX MED GY IP 250 OP 250 PS 637

## 2020-06-23 PROCEDURE — 25000132 ZZH RX MED GY IP 250 OP 250 PS 637: Performed by: PHYSICIAN ASSISTANT

## 2020-06-23 PROCEDURE — 36415 COLL VENOUS BLD VENIPUNCTURE: CPT | Performed by: INTERNAL MEDICINE

## 2020-06-23 PROCEDURE — 27210437 ZZH NUTRITION PRODUCT SEMIELEM INTERMED LITER

## 2020-06-23 PROCEDURE — 27210429 ZZH NUTRITION PRODUCT INTERMEDIATE LITER

## 2020-06-23 PROCEDURE — 25000128 H RX IP 250 OP 636: Performed by: STUDENT IN AN ORGANIZED HEALTH CARE EDUCATION/TRAINING PROGRAM

## 2020-06-23 PROCEDURE — 85027 COMPLETE CBC AUTOMATED: CPT | Performed by: INTERNAL MEDICINE

## 2020-06-23 RX ORDER — IPRATROPIUM BROMIDE AND ALBUTEROL SULFATE 2.5; .5 MG/3ML; MG/3ML
3 SOLUTION RESPIRATORY (INHALATION) EVERY 4 HOURS PRN
DISCHARGE
Start: 2020-06-23 | End: 2024-08-08

## 2020-06-23 RX ORDER — LISINOPRIL 5 MG/1
5 TABLET ORAL DAILY
DISCHARGE
Start: 2020-06-24 | End: 2024-08-08

## 2020-06-23 RX ORDER — METOPROLOL TARTRATE 25 MG/1
12.5 TABLET, FILM COATED ORAL 2 TIMES DAILY
DISCHARGE
Start: 2020-06-23 | End: 2024-08-08

## 2020-06-23 RX ORDER — HEPARIN SODIUM 5000 [USP'U]/.5ML
5000 INJECTION, SOLUTION INTRAVENOUS; SUBCUTANEOUS EVERY 8 HOURS
DISCHARGE
Start: 2020-06-23 | End: 2024-08-08

## 2020-06-23 RX ORDER — ONDANSETRON 2 MG/ML
4 INJECTION INTRAMUSCULAR; INTRAVENOUS EVERY 6 HOURS PRN
DISCHARGE
Start: 2020-06-23 | End: 2024-08-08

## 2020-06-23 RX ORDER — QUETIAPINE FUMARATE 100 MG/1
100 TABLET, FILM COATED ORAL AT BEDTIME
DISCHARGE
Start: 2020-06-23 | End: 2024-08-08

## 2020-06-23 RX ORDER — SIMETHICONE 40MG/0.6ML
40 SUSPENSION, DROPS(FINAL DOSAGE FORM)(ML) ORAL EVERY 6 HOURS PRN
DISCHARGE
Start: 2020-06-23 | End: 2024-08-08

## 2020-06-23 RX ORDER — AMINO AC/PROTEIN HYDR/WHEY PRO 10G-100/30
2 LIQUID (ML) ORAL DAILY
Status: DISCONTINUED | OUTPATIENT
Start: 2020-06-23 | End: 2020-06-25 | Stop reason: HOSPADM

## 2020-06-23 RX ORDER — PHENOL 1.4 %
10 AEROSOL, SPRAY (ML) MUCOUS MEMBRANE AT BEDTIME
DISCHARGE
Start: 2020-06-23 | End: 2024-08-08

## 2020-06-23 RX ORDER — FUROSEMIDE 20 MG
20 TABLET ORAL
Status: DISCONTINUED | OUTPATIENT
Start: 2020-06-23 | End: 2020-06-25 | Stop reason: HOSPADM

## 2020-06-23 RX ORDER — ASPIRIN 81 MG/1
81 TABLET, CHEWABLE ORAL DAILY
DISCHARGE
Start: 2020-06-24 | End: 2024-08-08

## 2020-06-23 RX ORDER — LEVETIRACETAM 100 MG/ML
1000 SOLUTION ORAL 2 TIMES DAILY
DISCHARGE
Start: 2020-06-23 | End: 2024-08-08

## 2020-06-23 RX ORDER — FUROSEMIDE 20 MG
20 TABLET ORAL
DISCHARGE
Start: 2020-06-24 | End: 2024-08-08

## 2020-06-23 RX ORDER — CHLORHEXIDINE GLUCONATE ORAL RINSE 1.2 MG/ML
15 SOLUTION DENTAL 4 TIMES DAILY
DISCHARGE
Start: 2020-06-23 | End: 2024-08-08

## 2020-06-23 RX ORDER — ATORVASTATIN CALCIUM 10 MG/1
10 TABLET, FILM COATED ORAL EVERY EVENING
DISCHARGE
Start: 2020-06-23 | End: 2024-08-08

## 2020-06-23 RX ORDER — METOCLOPRAMIDE HYDROCHLORIDE 5 MG/5ML
10 SOLUTION ORAL 2 TIMES DAILY PRN
DISCHARGE
Start: 2020-06-23 | End: 2024-08-08

## 2020-06-23 RX ADMIN — LEVETIRACETAM 1000 MG: 100 SOLUTION ORAL at 20:40

## 2020-06-23 RX ADMIN — MELATONIN TAB 3 MG 10 MG: 3 TAB at 21:53

## 2020-06-23 RX ADMIN — TICAGRELOR 90 MG: 90 TABLET ORAL at 08:35

## 2020-06-23 RX ADMIN — MICONAZOLE NITRATE: 20 POWDER TOPICAL at 20:41

## 2020-06-23 RX ADMIN — Medication 12.5 MG: at 08:35

## 2020-06-23 RX ADMIN — Medication 12.5 MG: at 20:41

## 2020-06-23 RX ADMIN — FUROSEMIDE 20 MG: 20 TABLET ORAL at 13:23

## 2020-06-23 RX ADMIN — HEPARIN SODIUM 5000 UNITS: 5000 INJECTION, SOLUTION INTRAVENOUS; SUBCUTANEOUS at 16:25

## 2020-06-23 RX ADMIN — CHLORHEXIDINE GLUCONATE 0.12% ORAL RINSE 15 ML: 1.2 LIQUID ORAL at 16:24

## 2020-06-23 RX ADMIN — Medication 40 MG: at 08:35

## 2020-06-23 RX ADMIN — QUETIAPINE FUMARATE 100 MG: 50 TABLET ORAL at 21:53

## 2020-06-23 RX ADMIN — LOPERAMIDE HCL 2 MG: 1 SOLUTION ORAL at 16:24

## 2020-06-23 RX ADMIN — MICONAZOLE NITRATE: 20 POWDER TOPICAL at 08:35

## 2020-06-23 RX ADMIN — CHLORHEXIDINE GLUCONATE 0.12% ORAL RINSE 15 ML: 1.2 LIQUID ORAL at 20:41

## 2020-06-23 RX ADMIN — ACETAMINOPHEN 650 MG: 325 TABLET, FILM COATED ORAL at 13:23

## 2020-06-23 RX ADMIN — ATORVASTATIN CALCIUM 10 MG: 10 TABLET, FILM COATED ORAL at 20:41

## 2020-06-23 RX ADMIN — FUROSEMIDE 20 MG: 20 TABLET ORAL at 08:35

## 2020-06-23 RX ADMIN — TICAGRELOR 90 MG: 90 TABLET ORAL at 20:41

## 2020-06-23 RX ADMIN — HEPARIN SODIUM 5000 UNITS: 5000 INJECTION, SOLUTION INTRAVENOUS; SUBCUTANEOUS at 08:35

## 2020-06-23 RX ADMIN — Medication 40 MG: at 20:40

## 2020-06-23 RX ADMIN — LEVETIRACETAM 1000 MG: 100 SOLUTION ORAL at 08:35

## 2020-06-23 RX ADMIN — CHLORHEXIDINE GLUCONATE 0.12% ORAL RINSE 15 ML: 1.2 LIQUID ORAL at 13:19

## 2020-06-23 RX ADMIN — HEPARIN SODIUM 5000 UNITS: 5000 INJECTION, SOLUTION INTRAVENOUS; SUBCUTANEOUS at 00:42

## 2020-06-23 RX ADMIN — Medication 2 PACKET: at 14:50

## 2020-06-23 RX ADMIN — LISINOPRIL 5 MG: 5 TABLET ORAL at 08:35

## 2020-06-23 RX ADMIN — CHLORHEXIDINE GLUCONATE 0.12% ORAL RINSE 15 ML: 1.2 LIQUID ORAL at 08:34

## 2020-06-23 RX ADMIN — ASPIRIN 81 MG CHEWABLE TABLET 81 MG: 81 TABLET CHEWABLE at 08:35

## 2020-06-23 ASSESSMENT — ACTIVITIES OF DAILY LIVING (ADL)
ADLS_ACUITY_SCORE: 16
ADLS_ACUITY_SCORE: 14
ADLS_ACUITY_SCORE: 11
ADLS_ACUITY_SCORE: 14
ADLS_ACUITY_SCORE: 14
ADLS_ACUITY_SCORE: 16

## 2020-06-23 ASSESSMENT — MIFFLIN-ST. JEOR: SCORE: 1675.75

## 2020-06-23 NOTE — DISCHARGE SUMMARY
[address] [address]  p: [phone]    Discharge Summary: Cardiology Service    Scot Bishop MRN# [id number]   YOB: 1980 Age: 39 year old       Admission Date: 5/17/2020  Discharge Date: 06/25/2020      Discharge Diagnoses:  1. Refractory VF arrest 2/2 STEMI, acute LAD occlusion  2. Coronary artery disease  3. Hypoxic brain injury  4. Aspiration pneumonia - resolved  5. Acute hypoxic respiratory failure   6. Acute kidney injury - resolved  7. Shock liver - resolved  8. Thrombocytosis - resolved    Pertinent Procedures:  1. Coronary angiogram   2. ECMO cannulation/Decannulation 5/17-5/19  3. IABP 5/19-5/20  4. Trach 6/8  5. PEG 6/9    Consults:  Neurology  ENT  Palliative Care  Infectious disease  General surgery   Interventional radiology     Imaging with results:  Echocardiogram 6/10/2020:  Interpretation Summary  LVEF 36% based on biplane 2D tracing.  LAD territory akinesis.  Right ventricular function, chamber size, wall motion, and thickness are  normal.  The inferior vena cava is normal.  No pericardial effusion is present.  There has been no change.    Left Ventricle  LVEF 36% based on biplane 2D tracing. Left ventricular wall thickness is  normal. LAD territory akinesis.     Right Ventricle  Right ventricular function, chamber size, wall motion, and thickness are  normal.     Vessels  The inferior vena cava is normal.     Pericardium  No pericardial effusion is present.     Compared to Previous Study  There has been no change.    Coronary Angiogram 6/23/2020:  Coronary Findings   Diagnostic   Dominance: Right   Left Anterior Descending    Prox LAD lesion is 100% stenosed. Culprit lesion. The lesion is type B1 - medium risk and bifurcated. The lesion was not previously treated. The stenosis was measured by a visual reading. Aspiration thrombectomy was performed with the Penumbra. Two runs were performed with restoration of  ANDRE-3 flow into the distal LAD and D1. Repeat angiography showed 50% stenosis in the mid LAD and 50% stenosis in D1. The sequence of PCI in the mid LAD and D1 was as follows. Run through to cross culprit mid LAD. Aspriation thrombectomy. Run through into D1. PTCA LAD with 3.0x20. PTCA D1 with 2.5x12. Stent prox-mid LAD with Synergy 3.0x32. Remove D1 wire and rewire D1 through LAD struts. Post dilate LAD stent with 3.5x20 NC. IVUS LAD. IVUS showed stent well apposed to plaque but there was significant positive remodeling in vessel justifying further expansion of stent. POBA D1 with 2.75x12 and kiss with 4.0x20 NC in proximal/mid LAD. Wire and balloons removed.    First Diagonal Branch    1st Diag lesion is 50% stenosed. Not the culprit lesion. The lesion is type A - low risk. The lesion was not previously treated. See comments under mid LAD (bifurcation lesion)    Right Coronary Artery    Prox RCA lesion is 25% stenosed.    Mid RCA lesion is 25% stenosed.      Intervention   Prox LAD lesion    Stent    Lesion length: 25 mm. CATH GUIDING BLUE YELLOW PTFE XB3.5 [id number] guide catheter was successful. The guidewire guidewire crosses lesion There is no pre-interventional antegrade distal flow (ANDRE 0). A STENT SYNERGY DRUG ELUTING 3.20Y35EN [id number] drug eluting stent was successfully placed. Pre-stent angioplasty was performed using a CATH BALLOON EMERGE 3.0X20MM [id number] supply. . The strut is apposed. Post-stent angioplasty was performed using a CATH BALLOON NC EMERGE 3.72R38WI [id number] supply. An additional CATH BALLOON NC EMERGE 4.93T52SV [id number] supply was used. . The post-interventional distal flow is normal (ANDRE 3). The intervention was successful. No complications occurred at this lesion. IVUS was performed on the lesion. Ultrasound supply: CATH US OD 5FR OD SEC 2.9FR EAGLE EYE PLATINUM 0.014 41432F. There is moderate plaque burden detected.    There is a 10% residual  stenosis post intervention.      1st Diag lesion    Angioplasty    Angioplasty using a standard balloon was performed independent of stent deployment. CATH GUIDING BLUE YELLOW PTFE XB3.5 [id number] guide catheter was successful. The guidewire guidewire crosses lesion The balloon used was a CATH BALLOON EMERGE 2.5X12MM [id number]. An additional balloon used was a CATH BALLOON EMERGE 2.75X8MM [id number] is no pre-interventional antegrade distal flow (ANDRE 0). The post-interventional distal flow is normal (ANDRE 3). Intervention successful.    There is a 30% residual stenosis post intervention.        Brief HPI:  Scot Bishop is a 39 year old male with no known significant past medical history who presented to [facility] on 5/17/20 following a PEA/VF arrest and anterior STEMI. He was emergently taken to the cath lab and promptly placed on VA ECMO. Angiography revealed a thrombotic occlusion of the LAD successfully treated with aspiration thrombectomy and PCI w/NAZIA of proximal LAD.     Hospital Course by Diagnosis:  # Hypoxic Brain injury  Initial CT head negative. Cooled to 34 degrees on arrival, rewarmed 5/19. EEG on 5/25 showed severe diffuse encephalopathy without seizures or epileptiform discharges. Repeat CTH on 5/25 showed multifocal acute/subacute cerebral infarcts. Brain MRI 5/26 showed multiple diffuse infarcts consistent with hypoxic brain injury. Concerns for LUE posturing and lack of LLE movement, repeat CTH 6/3 showed no acute changes. Trach placed 6/8. Moving all extremities spontaneously although nothing to command. Occasionally making eye, attending to voice.   - Continue Seroquel 100 qPM.   - Can titrate down 25mg every other day until discontinued   - PT/OT/SP  - Discharge to [facility] for further rehab/cares     # Refractory VF arrest s/p NAZIA to pLAD  # Sinus Tachycardia   Pt reportedly had chest pain that started on 5/16/20. He was using Drano on his pipes at home and did not  initially seek medical attention for CP and SOB since it said on the box that Drano can cause those symptoms. He took a shower and had syncopal episode after coming out of the shower. EMS was called; initial rhythm asystole,after several rounds CPR, --> PEA --> VF in the field. After multiple cycles of epinephrine had ROSC. He was intubated in the field. BP was 90s/60s as he was being transported from ED but he had recurrent cardiac arrest on arrival to Cath Lab. ECG showed ST elevation in aVR. He was promptly placed on VA ECMO. He had thrombotic occlusion of proximal LAD and underwent successful aspiration thrombectomy and PCI w/ NAZIA of proximal and mid LAD. Patient remains in NSR with rates in the 110-120's. Suspect ongoing tachycardia d/t evolving MI and post-arrest state. TTE 6/10 showed LVEF 36% with LAD territory akinesis, RV normal. Patient now on GDMT. 5/27, pt had 23 second run of VT, now ST with rare PVCs.    - Decannulated 5/19  - IABP discontinued 5/20  - DAPT: Aspirin 81 mg (lifelong) and ticagrelor 90 mg BID (x 1 year)  - BB: Metoprolol tartrate 25 mg BID (can transition to succinate when off TF, med cannot be crushed)  - ACEi: Lisinopril 5 mg daily, can titrate as OP if BP and HR allow  - Statin: Atorvastatin 10 mg daily   - Diuresis: lasix 20 mg BID   - Daily weights  - Follow up with Dr. Matamoros in interventional cardiology clinic in 2 weeks from discharge from ARU for post cardiac arrest follow up    #Emesis  Recurrent emesis since PEG tube placement and TF. Abdominal xray and ultrasound unremarkable. Loose stools which are not new. Liver enzymes normal, T bili 0.4. Lipase 487 today. Placed NJ for relief, patient pulled out from non-purposeful movements. No obvious source for recurrent emesis on CT chest/abdomen/pelvis. Currently tolerating TF at goal 60 ml/hr. Scheduled zofran and reglan for relief.  All non-essential meds were discontinued (metoprolol, lisinopril, lasix) on 6/18 to help aid with  relief.  Tolerating TF at goal 60 ml/hr. All medications resumed 6/20. Patient continues to have issues with emesis intermittently.   - reglan 10 mg BID PRN  - zofran q6h to PRN  - Protonix BID   - Plan to transition to G-J tube on 7/21     # Aspiration pneumonia - resolved   # Leukocytosis  GNR on sputum cultures, blood cultures 6/1 grew GPCs, sputum cultures 6/2 grew non-lactose fermenting GNR. ID consulted for persistent fevers, re-cultured on 6/13 for low grade temp and elevated WBC, blood culture negative, sputum grew acinetobacter. Per ID, empirically treating with zosyn x 7 days (end date 6/20). Repeat c-diff and UA negative. CXR unchanged. CT negative for other source of infection. Currently afebrile.   - s/p 7 days of zosyn (end date 6/20)     # Acute hypoxic respiratory failure, improving   Admission CT chest showed bilateral consolidations consistent with aspiration PNA. Patient experienced thick secretions that required bagging on 5/22. Bronchoscopy performed 5/21. Pulmonary was following, now signed off. Significant pressure injury of tongue from ET tube with splitting. CXR on 6/15 showed stable cervical subcutaneous emphysema. CT demonstrated crepitus extending to mediastinum and pectoralis, NTD. Trach placed 6/8, tolerating room air, trach dome. Sputum culture grew GNR, vanc/zosyn added 6/4. Zosyn restarted 6/14 for ongoing low grade temp and elevated WBC, non-lactose fermenting GNR in sputum, zosyn re-started for 7-day course. 100.1 temp 6/18, re-cultured. WBC nml today. COVID negative.   - zosyn as above  - continue mucomyst and albuterol nebs PRN  - ENT was consulted for pressure injury of tongue, plan for follow up as OP for possible surgical repair after discharge from [facility]     # Elevated lipase - improving  Lipase 487 6/21. CT abdomen/chest/pelvis and abdominal ultrasound unremarkable. Likely acute phase reactant. No s/s of acute pancreatitis.   - As OP if Lipase normalizes, would increase  Lipitor to high intensity     Resolved hospital problems  # Hypernatremia, resolved   Na levels 149 on 6/17. Now improving with Na 137 today.  - Decrease FWF to 60 ml q4h  - Will stop D5 infusion  - CMP weekly     # Acute kidney injury, resolved    Creatinine peak 1.9 on 5/20 secondary to arrest. Now resolved. Renal function stable  - Continue  ml q2h  - Monitor I/O's  - Replace lytes per protocol     # Thrombocytosis, resolved   Receiving aspirin and ticagrelor for NAZIA. Transfused 1 unit PRBCs 5/21. Plts 258 and Hgb 10 today. No signs of active bleeding. Right inguinal hematoma on US at ECMO cannulae insertion site.     # Shock liver, resolved   LFTs normal       Condition on discharge  Temp:  [97.4  F (36.3  C)-98.7  F (37.1  C)] 98.7  F (37.1  C)  Heart Rate:  [101-120] 109  Resp:  [16-18] 18  BP: ()/(60-73) 105/73  FiO2 (%):  [21 %] 21 %  SpO2:  [98 %-100 %] 99 %  General: Alert, interactive, NAD  HEENT: sclera anicteric, EOMI, midline tongue ulceration present  Neck: JVP flat, carotid 2+ bilaterally  Cardiovascular: regular rate and rhythm, normal S1 and S2, no murmurs, gallops, or rubs  Resp: clear to auscultation bilaterally, mechanical lung sounds  GI: Soft, nontender, nondistended, abdominal binder in place. +BS.  No HSM or masses, no rebound or guarding.  Extremities: no edema, no cyanosis or clubbing, dorsalis pedis and posterior tibialis pulses 2+ bilaterally  Skin: Warm and dry, no jaundice or rash  Neuro: Opens eyes spontaneously, intermittent tracking, does not follow commands    Discharge medications:   Current Discharge Medication List      START taking these medications    Details   acetaminophen (TYLENOL) 325 MG tablet Take 2 tablets (650 mg) by mouth every 6 hours as needed for mild pain or fever  Qty: 90 tablet, Refills: 1    Associated Diagnoses: Cardiac arrest (H)      aspirin (ASA) 81 MG chewable tablet 1 tablet (81 mg) by Per Feeding Tube route daily  Qty: 90 tablet, Refills: 3     Associated Diagnoses: Cardiac arrest (H)      atorvastatin (LIPITOR) 10 MG tablet Take 1 tablet (10 mg) by mouth every evening  Qty: 90 tablet, Refills: 3    Associated Diagnoses: Cardiac arrest (H)      !! furosemide (LASIX) 20 MG tablet Take 1 tablet (20 mg) by mouth daily  Qty: 30 tablet, Refills: 0    Associated Diagnoses: Cardiac arrest (H); Coronary artery disease due to lipid rich plaque      !! furosemide (LASIX) 20 MG tablet Take 1 tablet (20 mg) by mouth 2 times daily  Qty: 60 tablet, Refills: 3    Associated Diagnoses: Cardiac arrest (H); Acute systolic heart failure (H)      Heparin Sodium, Porcine, (HEPARIN ANTICOAGULANT) 5000 UNIT/0.5ML injection Inject 0.5 mLs (5,000 Units) Subcutaneous every 8 hours  Qty: 30 Syringe, Refills: 3    Associated Diagnoses: Cardiac arrest (H); Hypoxic brain injury (H)      ipratropium - albuterol 0.5 mg/2.5 mg/3 mL (DUONEB) 0.5-2.5 (3) MG/3ML neb solution Take 1 vial (3 mLs) by nebulization every 4 hours as needed for shortness of breath / dyspnea  Qty: 3 mL, Refills: 1    Associated Diagnoses: Cardiac arrest (H); Acute and chronic respiratory failure with hypoxia (H)      levalbuterol (XOPENEX) 0.63 MG/3ML neb solution Take 3 mLs (0.63 mg) by nebulization every 4 hours as needed for wheezing  Qty: 3 mL, Refills: 3    Associated Diagnoses: Cardiac arrest (H); Acute and chronic respiratory failure with hypoxia (H)      levETIRAcetam (KEPPRA) 100 MG/ML solution 10 mLs (1,000 mg) by Oral or Feeding Tube route 2 times daily  Qty: 100 mL, Refills: 0    Associated Diagnoses: Cardiac arrest (H); Hypoxic brain injury (H)      lisinopril (ZESTRIL) 5 MG tablet Take 1 tablet (5 mg) by mouth daily  Qty: 60 tablet, Refills: 3    Associated Diagnoses: Cardiac arrest (H); Coronary artery disease due to lipid rich plaque; Acute systolic heart failure (H)      loperamide (IMODIUM) 1 MG/7.5ML liquid Take 15 mLs (2 mg) by mouth 3 times daily as needed for diarrhea  Qty: 118 mL, Refills:  1    Associated Diagnoses: Diarrhea due to malabsorption      metoclopramide (REGLAN) 5 MG/5ML solution 10 mLs (10 mg) by Oral or Feeding Tube route 2 times daily as needed for nausea  Qty: 120 mL, Refills: 1    Associated Diagnoses: Cardiac arrest (H); Nausea      metoprolol tartrate (LOPRESSOR) 25 MG tablet Take 1 tablet (25 mg) by mouth 2 times daily  Qty: 90 tablet, Refills: 3    Associated Diagnoses: Cardiac arrest (H); Coronary artery disease due to lipid rich plaque; Acute systolic heart failure (H)      miconazole (MICATIN) 2 % external powder Apply topically 2 times daily  Qty: 2 g, Refills: 0    Associated Diagnoses: Dermatitis      nitroGLYcerin (NITROSTAT) 0.4 MG sublingual tablet For chest pain place 1 tablet under the tongue every 5 minutes for 3 doses. If symptoms persist 5 minutes after 1st dose call 911.  Qty: 30 tablet, Refills: 0    Associated Diagnoses: Coronary artery disease due to lipid rich plaque      pantoprazole (PROTONIX) 2 mg/mL SUSP suspension 20 mLs (40 mg) by Oral or Feeding Tube route 2 times daily  Qty: 100 mL, Refills: 0    Associated Diagnoses: Gastrointestinal hemorrhage associated with gastritis, unspecified gastritis type      QUEtiapine (SEROQUEL) 100 MG tablet 1 tablet (100 mg) by Oral or Feeding Tube route At Bedtime  Qty: 30 tablet, Refills: 0    Comments: Can decrease by 25 mg every other day until discontinued  Associated Diagnoses: Cardiac arrest (H); Hypoxic brain injury (H)      ticagrelor (BRILINTA) 90 MG tablet 1 tablet (90 mg) by Oral or Feeding Tube route 2 times daily  Qty: 180 tablet, Refills: 3    Associated Diagnoses: Cardiac arrest (H); Coronary artery disease due to lipid rich plaque       !! - Potential duplicate medications found. Please discuss with provider.      CONTINUE these medications which have NOT CHANGED    Details   melatonin 10 MG TABS tablet Take 1 tablet (10 mg) by mouth At Bedtime  Qty: 90 tablet, Refills: 3    Associated Diagnoses: Cardiac  arrest (H); Hypoxic brain injury (H)      protein modular (PROSOURCE TF) LIQD 2 packets by Per Feeding Tube route daily  Qty: 45 mL, Refills: 1    Associated Diagnoses: Cardiac arrest (H); At high risk for malnutrition             Labs or imaging requiring follow-up after discharge:  CMP, CBC 1 week      Follow-up:  ENT in clinic within 2-4 weeks of discharge from TCU  Cardiology clinic within 2 weeks of discharge from TCU      Code status:  FULL      Tamara Hurst, APRN, CNP  [facility]  [phone]

## 2020-06-23 NOTE — PLAN OF CARE
Neuro: Patient awake/alert. Pupils round and reactive.  Moving all extremities, but not on command.  Slight tracking noted occassionally. Mute.  Cardiac: SR-ST -110. VSS.     Respiratory: Sating above 90% on trach dome at 21% FIO2 Strong cough per self. No need for trach suctioning. Lung sounds clear/diminished and intermittent coarse.  Trach Shiley #6. Trach cares completed.  GI/: Adequate urine output via condom cath.  Monitor urine output r/t to PO lasix.  3x loose stools during shift.  Imodium given 1x.  Diet/appetite: Tolerating TF diet at 60 ml/hr and 60 ml water flush Q 4 hr. NPO. TF titration per order r/t to TF type change.  Tolerating thus far. Protein added via G-tube  Activity:  Assist of Q 2 hr with repositioning in bed.  Patient makes frequent shift changes in bed.  Up in chair 1x during shift via lift.  Pain: At acceptable level on current regimen. Gave Tylenol 1x during shift for face grimacing with incont care.  Skin: Several skin issues noted (see Flowsheet). Sacrum/scrotum/groin redden.  Slight excoriation noted to scrotum and sacrum area r/t to loose stool.  Barrier cream applied. Abrasions to face.   LDA's: Left PIV-SL.  G-tube for tube feed.  Condom cath in place.  Trach on trach dome. PICC removed on 6/21.     Plan: Continue with POC. Notify primary team with changes.

## 2020-06-23 NOTE — PROGRESS NOTES
CLINICAL NUTRITION SERVICES - REASSESSMENT NOTE      Nutrition Prescription     RECOMMENDATIONS FOR MDs/PROVIDERS TO ORDER:  -Fluids and bowel regimen per team   -Recommend transition back to scheduled Reglan dosing if N/V reoccur, as appropriate, with scheduled Zofran     Malnutrition Status:    Unable to determine due to unable to complete all parameters of nutrition assessment at this time.     Recommendations already ordered by Registered Dietitian (RD):  Given floor status and awaiting bed at facility, transitioning to standard maintenance EN formula:  Isosource 1.5 @ goal 60 ml/hr (1440 ml/day) + 2 pkts ProSource daily to provide 2240 kcals (32 kcal/kg/day), 120 g PRO (1.7 g/kg/day), 1094 ml free H2O, 253 g CHO and 22 g Fiber daily.  -Start new formula IsoSource 1.5 @ 20 mL/hr and adv by 10 mL q2h to goal pending tolerance     Future/Additional Recommendations:  -If emesis reoccurs, recommend J extension to PEG tube. Pt was tolerating enteral nutrition via NDT prior to PEG placement and was receiving full nutrition at that time.   -Monitor stool trends/tolerance with transition to standard EN regimen. If N/V coinciding with transition to new EN, recommend transition back to Impact Peptide 1.5 @ 60 mL/hr.  -Continue to monitor weight trends and wound healing, split tongue is currently stable       EVALUATION OF THE PROGRESS TOWARD GOALS   Diet: NPO    Enteral Access: PEG placed 6/9 (plan for J-extension once ostomy healed) - pt had pulled NDT overnight on 6/16    FWF: 60 mL q4h    Nutrition Support: Impact Peptide @ goal 60 ml/hr (1440 ml/day) to provide 2160 kcals (31 kcal/kg/day), 135 g PRO (1.9 g/kg/day), 1109 ml free H2O, 92 g Fat (50% from MCTs), 202 g CHO and no Fiber daily.       Enteral Intake:  No emesis since 6/17 per MD notes and I/Os. Tolerating TF at goal 60 mL/hr via PEG. Average TF intakes over the past 7 days: 944 ml, 1416 kcals (21 kcal/kg, or 81% minimum assessed needs) , 89 g pro (1.3 g  pro/kg, or 85% minimum assessed needs)     NEW FINDINGS   Weight: ~22% weight loss in the past 1 month. Suspect some weight loss 2/2 to fluids (on Lasix). See new dosing wt below  06/23/20 0335  82.3 kg (181 lb 7 oz)    06/20/20 0537  86.8 kg (191 lb 5.8 oz)    06/19/20 0426  82 kg (180 lb 12.4 oz)    06/18/20 0122  81.1 kg (178 lb 12.7 oz) -- lowest/driest wt this admit   06/17/20 0000  83.5 kg (184 lb 1.4 oz)    06/16/20 0000  84.6 kg (186 lb 8.2 oz)      Labs: Reviewed, unremarkable.    Meds: Lasix, NS @ 10 mL/hr    GI:  Per I/Os, last BM today. Patient with ongoing loose stool per RN flowsheets; 0-11 BMs daily over past week per I/Os. C diff negative 6/14.     Skin: Per WOCN note on 6/19: Split tongue stable as of 6/19 - holding off on additional surgery d/t pt will not be eating or speaking.    Resp: Trach Dome      NEW Dosing weight: 69 kg (adjusted, based on IBW of 64.5 kg and driest/lowest wt this admit of 81.1 kg on 6/18).       Updated Assessed Needs  Energy needs: 1725 - 2070+ kcal/day (25-30 kcal/kg/day)   Justification: Maintenance, obese   Protein needs: 90 - 138+ g protein/day (1.3 - 2 g/kg/day)   Justification: Maintenance, preservation of LBM     MALNUTRITION**Nutrition focused physical exam available per MD request. --> Unable to obtain in-person nutrition history or nutrition focused physical assessment (NFPA) from patient as the number of staff going into rooms is restricted to limit exposure and to minimize use of PPE.  % Intake: Decreased intake does not meet criteria  % Weight Loss: > 5% in 1 month (severe)  Subcutaneous Fat Loss: Unable to assess  Muscle Loss: Unable to assess  Fluid Accumulation/Edema: None noted  Malnutrition Diagnosis: Unable to determine due to unable to complete all parameters of nutrition assessment at this time.    Previous Goals   Total avg nutritional intake to meet a minimum of 25 kcal/kg and 1.5 g PRO/kg daily (per dosing wt 71 kg).  Evaluation: Not  met     Previous Nutrition Diagnosis  Inadequate protein-energy intake related to gastric intolerance to tube feeds with ongoing emesis as evidenced by pt meeting < 50% of ordered TF regimen over the past 7 days.   Evaluation: Resolved     CURRENT NUTRITION DIAGNOSIS  Inadequate oral intake related to NPO d/t inability for PO 2/2 neuro deficits as evidenced by pt continues to remain reliant on EN support to meet 100% nutrition needs     INTERVENTIONS  Implementation  -Enteral regimen: Transitioning to standard EN regimen given no longer in ICU     Goals  Total avg nutritional intake to meet a minimum of 25 kcal/kg and 1.5 g PRO/kg daily (per NEW dosing wt 69 kg).     Monitoring/Evaluation  Progress toward goals will be monitored and evaluated per protocol.     Shelbi Acuña RD, LD  Pager: [phone]

## 2020-06-23 NOTE — PLAN OF CARE
"Neuro: JUDI due to hypoxic brain energy.  Pt received HS dose of Seroquel and Melatonin at 2230 last night. These medications have reduced restlessness till about 0600 the last two nights allowing patient to get some sleep  Cardiac: ST. VSS. /69 (BP Location: Left arm)   Pulse 98   Temp 98.8  F (37.1  C) (Oral)   Resp 18   Ht 1.676 m (5' 6\")   Wt 82.3 kg (181 lb 7 oz)   SpO2 99%   BMI 29.28 kg/m     Respiratory: Sating % on trach dome at 21% FiO2; Trach cares were completed at 0345.  Trach is a Shiley number 6, small amount of brown drainage was visible on the foam dressing from under the trach plate when it was changed.  Patient has required no suctioning overnight.  GI/: Adequate urine output. Patient removed three different condom catheters by moving around in the bed.  A brief was placed on the patient and his restlessness decreased.  Pt has been incontinent large amounts three times overnight.  Patient has increased restlessness after urination.  No BM overnight.  Diet/appetite: Tolerating NPO with tube feed diet.Tube feeds are at 60 ml/hour which is goal.  FWF 60 ml q 4 hours. No vomiting or s/s of nausea overnight.  Activity:  Assist of 2 with mechanical lift up to chair and in halls.  Pain: At acceptable level on current regimen; no physiological s/s of pain overnight.  Skin: No new deficits noted. No change   LDA's: 1 PIV saline locked. Shiley 6 trach on trach dome at 21%    Patient has been without a 1:1 sitter for the last 12 hours.  Pt has not pulled or tugged on any lines with the exception of the condom catheter.  Patient moves legs more than arms overnight and scoots around in the bed which led to the condom cath being pulled off three times.  The patient was then switched to a brief with barrier cream and the restlessness decreased and he has been sleeping a majority of the night with eyes closed since.     Plan: Continue with POC. Notify primary team with changes.   "

## 2020-06-23 NOTE — PROGRESS NOTES
Interventional Cardiology Progress Note      Assessment & Plan:  Scot Bishop is a 39 year old male with no known significant past medical history who presented to [facility] on 5/17/20 following a PEA/VF arrest and anterior STEMI. He was emergently taken to the cath lab and promptly placed on VA ECMO. Angiography revealed a thrombotic occlusion of the LAD successfully treated with aspiration thrombectomy and PCI w/NAZIA of proximal LAD.     Important dates  - Rewarmed 5/19  - De-cannulated 5/19  - IABP discontinued 5/20  - Trach 6/8   - PEG 6/9     # Hypoxic Brain injury  Initial CT head negative. Cooled to 34 degrees on arrival, rewarmed 5/19. EEG on 5/25 showed severe diffuse encephalopathy without seizures or epileptiform discharges. Repeat CTH on 5/25 showed multifocal acute/subacute cerebral infarcts. Brain MRI 5/26 showed multiple diffuse infarcts consistent with hypoxic brain injury. Concerns for LUE posturing and lack of LLE movement, repeat CTH 6/3 showed no acute changes. Trach placed 6/8. Moving all extremities spontaneously although nothing to command. Occasionally making eye contact and tracking at times.   - Continue Seroquel 100 qPM  - now off 1:1 x24h. No indication to resume    # Refractory VF arrest s/p NAZIA to pLAD  # Sinus Tachycardia   Pt reportedly had chest pain that started on 5/16/20. He was using Drano on his pipes at home and did not initially seek medical attention for CP and SOB since it said on the box that Drano can cause those symptoms. He took a shower and had syncopal episode after coming out of the shower. EMS was called; initial rhythm asystole. With chest compressions pt eventually had PEA and then eventually had VF in the field. After multiple cycles of epinephrine had ROSC. He was intubated in the field. Pt was brought to [facility] ED where he regained a pulse and was brought to the Cath Lab for coronary angiogram. Initially, BP was 90s/60s as he was being transported from ED but he  had recurrent cardiac arrest on arrival to Cath Lab. ECG showed ST elevation in aVR. He was promptly placed on VA ECMO. He had thrombotic occlusion of proximal LAD and underwent successful aspiration thrombectomy and PCI w/ NAZIA of proximal and mid LAD. Patient remains in NSR with rates in the 110-120's. Suspect ongoing tachycardia d/t evolving MI and post-arrest state. TTE 6/10 showed LVEF 36% with LAD territory akinesis, RV normal. Due to ongoing emesis, all non-essential meds were held 6/18. All medications resumed 6/20, tolerating well. Patient had 23 second run of VT overnight on 5/27. Monitoring QTc while on seroquel. QTc 467 6/21. Has been net + in recent days, but no signs of volume overload.   - Decannulated 5/19  - IABP discontinued 5/20  - Continue aspirin 81 mg and ticagrelor 90 mg BID  - Continue metoprolol tartrate 12.5 mg BID  - Continue lisinopril 5 mg daily   - Continue atorvastatin 10 mg daily   - lasix 20 mg BID      # Aspiration pneumonia - resolved   # Leukocytosis  GNR on sputum cultures, blood cultures 6/1 grew GPCs, sputum cultures 6/2 grew non-lactose fermenting GNR. ID consulted for persistent fevers, re-cultured on 6/13 for low grade temp and elevated WBC, blood culture negative, sputum grew acinetobacter. Per ID, empirically treating with zosyn x 7 days (end date 6/20). Repeat c-diff and UA negative. CXR unchanged. CT negative for other source of infection. Currently afebrile, WBC very mildly elevated at 11.2 - likely acute reactant due to hypoxic injury.   - s/p 7 days of zosyn (end date 6/20)  - Continue to monitor closely for signs of infection given lines     # Acute hypoxic respiratory failure, improving   Admission CT chest showed bilateral consolidations consistent with aspiration PNA. Patient experienced thick secretions that required bagging on 5/22. Bronchoscopy performed 5/21. Pulmonary was following, now signed off. Significant pressure injury of tongue from ET tube with  splitting. CXR on 6/15 showed stable cervical subcutaneous emphysema. CT demonstrated crepitus extending to mediastinum and pectoralis, NTD. Trach placed 6/8, tolerating room air. Sputum culture grew GNR, vanc/zosyn added 6/4. Zosyn restarted 6/14 for ongoing low grade temp and elevated WBC, non-lactose fermenting GNR in sputum, zosyn re-started for 7-day course. 100.1 temp 6/18, re-cultured. WBC nml today. COVID negative.   - zosyn as above  - continue mucomyst and albuterol nebs PRN  - ENT was consulted for pressure injury of tongue, plan for follow up as OP for possible surgical repair     # Elevated lipase - improving  Lipase 487 6/21. CT abdomen/chest/pelvis and abdominal ultrasound unremarkable. Likely acute phase reactant. No s/s of acute pancreatitis.   - Continue to monitor closely    Resolved hospital problems  # Hypernatremia, resolved   Na levels 149 on 6/17. Now improving with Na 137 today.  - Decrease FWF to 60 ml q4h  - Will stop D5 infusion  - Daily BMP    # Acute kidney injury, resolved    Cr 0.93, BUN 22 today. Renal function stable  - Continue  ml q2h  - Monitor I/O's  - Replace lytes per protocol    # Thrombocytosis, resolved   Receiving aspirin and ticagrelor for NAZIA. Transfused 1 unit PRBCs 5/21. Plts 258 and Hgb 10 today. No signs of active bleeding. Right inguinal hematoma on US at ECMO cannulae insertion site, ongoing serosanguinous oozing from lateral aspect of site.   - Continue to monitor     # Shock liver, resolved   LFTs normal    #Emesis -resolved  Recurrent emesis since PEG tube placement and TF. Abdominal xray and ultrasound unremarkable. Loose stools which are not new. Liver enzymes normal, T bili 0.4. Lipase 487 today. Placed NJ for relief, patient pulled out from non-purposeful movements. No obvious source for recurrent emesis on CT chest/abdomen/pelvis. Currently tolerating TF at goal 60 ml/hr. Scheduled zofran and reglan for relief. Plan to transition to G-J tube on 7/21.  "All non-essential meds were discontinued (metoprolol, lisinopril, lasix) on 6/18 to help aid with relief. No episodes of vomiting since 6/17. Tolerating TF at goal 60 ml/hr. All medications resumed 6/20, tolerating well via PEG.   - zofran q6h to PRN  - Change reglan BID to PRN   - Protonix BID (for upper GIB which has now resolved)      Prophylaxis: subcutaneous heparin  GI: Protonix  DVT: see above    Diet: TF at 60 ml/hr   Activity: full assist  Code Status: Full   Disposition: [facility] pending bed placement. Barriers include 1:1 sitter.     Patient discussed w/ Dr. Alanis.      Betito Sanders PA-C  [facility] Cardiology Team        Interval History:    - no acute events overnight. Remains on 1:1 sitter for non-purposeful movement and c/f catching lines/trach  - attends his eyes when talking to him, but otherwise not following commands or making purposeful movements. Frequent yawning on exam    Most recent vital signs:  BP (!) 142/70 (BP Location: Left arm)   Pulse 98   Temp 97.7  F (36.5  C) (Axillary)   Resp 18   Ht 1.676 m (5' 6\")   Wt 82.3 kg (181 lb 7 oz)   SpO2 99%   BMI 29.28 kg/m    Temp:  [97.7  F (36.5  C)-99  F (37.2  C)] 97.7  F (36.5  C)  Pulse:  [98] 98  Heart Rate:  [] 106  Resp:  [18-20] 18  BP: ()/(61-89) 142/70  FiO2 (%):  [21 %] 21 %  SpO2:  [93 %-100 %] 99 %  Wt Readings from Last 2 Encounters:   06/23/20 82.3 kg (181 lb 7 oz)         Intake/Output Summary (Last 24 hours) at 6/23/2020 1159  Last data filed at 6/23/2020 1119  Gross per 24 hour   Intake 1263 ml   Output 675 ml   Net 588 ml     Physical exam:  General: In bed, in NAD  HEENT: EOMI, PERRLA, no scleral icterus or injection  CARDIAC: RRR, no m/r/g appreciated. Peripheral pulses 2+  RESP: CTAB, no wheezes, rhonchi or crackles appreciated. Trach dome.   GI: NABS, NT/ND, no guarding or rebound  : no scrotal excoriation noted  EXTREMITIES: NO LE edema, pulses 2+. Femoral access site w/o bleeding, dressing c/d/i. No " bruits appreciated.   SKIN: No acute lesions appreciated. Lines CDI  NEURO: Non-purposeful movements. Moving extremities and opening eyes, although nothing to command. Withdraws to pain. Occasionally making eye contact attending to voice.     Drains and Tubes: All drain and tube sites are all benign, no signs of infection  Access: PICC line     Labs (Past three days):  CBC  Recent Labs   Lab 06/23/20  0504 06/22/20  0442 06/21/20  0323 06/20/20  0352   WBC 11.2* 10.9 9.4 9.4   RBC 3.86* 3.76* 3.27* 3.34*   HGB 11.3* 11.1* 9.4* 9.9*   HCT 37.2* 36.7* 31.7* 32.8*   MCV 96 98 97 98   MCH 29.3 29.5 28.7 29.6   MCHC 30.4* 30.2* 29.7* 30.2*   RDW 14.1 13.9 13.7 14.0    246 214 215     BMP  Recent Labs   Lab 06/23/20  0504 06/22/20  0442 06/21/20  1555 06/21/20  0323  06/20/20  1317 06/20/20  0352  06/19/20  0449  06/18/20  0541  06/17/20  0331    138 138 137   < >  --  144   < > 147*  148*   < > 148*   < > 146*   POTASSIUM 3.8 3.7  --  3.5  --  3.7 3.3*   < > 3.3*   < > 3.4   < > 3.5   CHLORIDE 103 106  --  106  --   --  113*  --  116*   < > 115*   < > 112*   CO2 27 24  --  25  --   --  25  --  26   < > 27   < > 30   ANIONGAP 6 8  --  7  --   --  6  --  5   < > 6   < > 5   * 107*  --  171*  --   --  119*  --  115*   < > 94   < > 93   BUN 29 26  --  22  --   --  22  --  21   < > 22   < > 22   CR 0.78 0.90  --  0.93  --   --  1.03  --  1.18   < > 1.15   < > 1.11   GFRESTIMATED >90 >90  --  >90  --   --  90  --  76   < > 79   < > 82   GFRESTBLACK >90 >90  --  >90  --   --  >90  --  88   < > >90   < > >90   TEN 9.7 9.1  --  8.6  --   --  9.0  --  9.3   < > 9.6   < > 9.6   MAG  --  1.9  --  2.0  --   --  1.9  --  2.1  --  2.1  --  2.3   PHOS  --   --   --   --   --   --  3.6  --  2.8  --  3.7  --  4.5    < > = values in this interval not displayed.     Troponins:   Lab Results   Component Value Date    TROPI 0.099 (H) 06/01/2020    TROPI 0.153 (HH) 05/31/2020    TROPI 0.218 (HH) 05/30/2020    TROPI 0.348  () 05/29/2020    TROPI 0.552 () 05/28/2020        INRNo lab results found in last 7 days.  Liver panel  Recent Labs   Lab 06/18/20  1158   PROTTOTAL 8.1   ALBUMIN 3.0*   BILITOTAL 0.4   ALKPHOS 119   AST 31   ALT 54       Imaging/procedure results:  EKG 12 Lead: 6/19/20      ECHO: 6/10/20  Interpretation Summary  LVEF 36% based on biplane 2D tracing.  LAD territory akinesis.  Right ventricular function, chamber size, wall motion, and thickness are  normal.  The inferior vena cava is normal.  No pericardial effusion is present.  There has been no change.    5/26/20  Interpretation Summary  Limited study to evaluate biventricular function.     Left ventricular size is normal. The Ejection Fraction is estimated at 35-40%.  The mid to distal anterolateral wall, the apex and distal septum is akinetic  consistent with LAD territory injury.  Right ventricular function, chamber size, wall motion, and thickness are  normal.  No pericardial effusion is present.     Compared to prior study EF appears grossly similar.    Coronary Angiogram: 5/17/20  Conclusion     Acute anterolateral myocardial infarction, with out of hospital cardiac arrest, ROSC, and recurrent arrest on arrival to cath lab    Single vessel CAD with thrombotic occlusion of proximal LAD    Successful aspiration thrombectomy of the proximal LAD    Successful stenting of proximal-mid LAD with NAZIA (3.0x20 Synergy), post-dilation to 4.0 mm guided by IVUS.    Successful POBA (with final kissing balloon) of ostial D1    Cardiac resuscitation with inotropic therapy    CPR    Successful placement of periperhal V-A ECMO with Cardiohelp (RFA, RFV)    Successful placement of Zoll Quattro cooling catheter for Thermoguard hypothermia    Successful placement of RT radial arterial line    IVUS was performed on the proximal/mid LAD post stenting    Successful insertion of distal antegrade Flex sheath into RT SFA

## 2020-06-24 LAB
ANION GAP SERPL CALCULATED.3IONS-SCNC: 6 MMOL/L (ref 3–14)
BACTERIA SPEC CULT: NO GROWTH
BUN SERPL-MCNC: 29 MG/DL (ref 7–30)
CALCIUM SERPL-MCNC: 9.7 MG/DL (ref 8.5–10.1)
CHLORIDE SERPL-SCNC: 102 MMOL/L (ref 94–109)
CO2 SERPL-SCNC: 29 MMOL/L (ref 20–32)
CREAT SERPL-MCNC: 0.84 MG/DL (ref 0.66–1.25)
ELASTASE PANC STL-MCNT: >800 UG/G
ERYTHROCYTE [DISTWIDTH] IN BLOOD BY AUTOMATED COUNT: 14.2 % (ref 10–15)
GFR SERPL CREATININE-BSD FRML MDRD: >90 ML/MIN/{1.73_M2}
GLUCOSE SERPL-MCNC: 122 MG/DL (ref 70–99)
HCT VFR BLD AUTO: 36.6 % (ref 40–53)
HGB BLD-MCNC: 11.2 G/DL (ref 13.3–17.7)
MAGNESIUM SERPL-MCNC: 2.2 MG/DL (ref 1.6–2.3)
MCH RBC QN AUTO: 29.5 PG (ref 26.5–33)
MCHC RBC AUTO-ENTMCNC: 30.6 G/DL (ref 31.5–36.5)
MCV RBC AUTO: 96 FL (ref 78–100)
PLATELET # BLD AUTO: 305 10E9/L (ref 150–450)
POTASSIUM SERPL-SCNC: 3.6 MMOL/L (ref 3.4–5.3)
RBC # BLD AUTO: 3.8 10E12/L (ref 4.4–5.9)
SODIUM SERPL-SCNC: 137 MMOL/L (ref 133–144)
SPECIMEN SOURCE: NORMAL
WBC # BLD AUTO: 11.2 10E9/L (ref 4–11)

## 2020-06-24 PROCEDURE — 36415 COLL VENOUS BLD VENIPUNCTURE: CPT | Performed by: INTERNAL MEDICINE

## 2020-06-24 PROCEDURE — 40000141 ZZH STATISTIC PERIPHERAL IV START W/O US GUIDANCE

## 2020-06-24 PROCEDURE — 12000004 ZZH R&B IMCU UMMC

## 2020-06-24 PROCEDURE — 25000132 ZZH RX MED GY IP 250 OP 250 PS 637: Performed by: STUDENT IN AN ORGANIZED HEALTH CARE EDUCATION/TRAINING PROGRAM

## 2020-06-24 PROCEDURE — 25000132 ZZH RX MED GY IP 250 OP 250 PS 637: Performed by: INTERNAL MEDICINE

## 2020-06-24 PROCEDURE — 85027 COMPLETE CBC AUTOMATED: CPT | Performed by: INTERNAL MEDICINE

## 2020-06-24 PROCEDURE — 40000275 ZZH STATISTIC RCP TIME EA 10 MIN

## 2020-06-24 PROCEDURE — 99233 SBSQ HOSP IP/OBS HIGH 50: CPT | Performed by: INTERNAL MEDICINE

## 2020-06-24 PROCEDURE — 83735 ASSAY OF MAGNESIUM: CPT | Performed by: INTERNAL MEDICINE

## 2020-06-24 PROCEDURE — 27210429 ZZH NUTRITION PRODUCT INTERMEDIATE LITER

## 2020-06-24 PROCEDURE — 25000132 ZZH RX MED GY IP 250 OP 250 PS 637

## 2020-06-24 PROCEDURE — 40000047 ZZH STATISTIC CTO2 CONT OXYGEN TECH TIME EA 90 MIN

## 2020-06-24 PROCEDURE — 25000132 ZZH RX MED GY IP 250 OP 250 PS 637: Performed by: PHYSICIAN ASSISTANT

## 2020-06-24 PROCEDURE — 25000132 ZZH RX MED GY IP 250 OP 250 PS 637: Performed by: NURSE PRACTITIONER

## 2020-06-24 PROCEDURE — 99207 ZZC APP CREDIT; MD BILLING SHARED VISIT: CPT | Performed by: PHYSICIAN ASSISTANT

## 2020-06-24 PROCEDURE — 25000128 H RX IP 250 OP 636: Performed by: STUDENT IN AN ORGANIZED HEALTH CARE EDUCATION/TRAINING PROGRAM

## 2020-06-24 PROCEDURE — 80048 BASIC METABOLIC PNL TOTAL CA: CPT | Performed by: INTERNAL MEDICINE

## 2020-06-24 RX ADMIN — CHLORHEXIDINE GLUCONATE 0.12% ORAL RINSE 15 ML: 1.2 LIQUID ORAL at 20:01

## 2020-06-24 RX ADMIN — Medication 12.5 MG: at 08:02

## 2020-06-24 RX ADMIN — HEPARIN SODIUM 5000 UNITS: 5000 INJECTION, SOLUTION INTRAVENOUS; SUBCUTANEOUS at 00:12

## 2020-06-24 RX ADMIN — LOPERAMIDE HCL 2 MG: 1 SOLUTION ORAL at 15:03

## 2020-06-24 RX ADMIN — CHLORHEXIDINE GLUCONATE 0.12% ORAL RINSE 15 ML: 1.2 LIQUID ORAL at 11:04

## 2020-06-24 RX ADMIN — CHLORHEXIDINE GLUCONATE 0.12% ORAL RINSE 15 ML: 1.2 LIQUID ORAL at 15:03

## 2020-06-24 RX ADMIN — QUETIAPINE FUMARATE 100 MG: 50 TABLET ORAL at 22:04

## 2020-06-24 RX ADMIN — FUROSEMIDE 20 MG: 20 TABLET ORAL at 13:34

## 2020-06-24 RX ADMIN — LEVETIRACETAM 1000 MG: 100 SOLUTION ORAL at 20:01

## 2020-06-24 RX ADMIN — MICONAZOLE NITRATE: 20 POWDER TOPICAL at 20:02

## 2020-06-24 RX ADMIN — ASPIRIN 81 MG CHEWABLE TABLET 81 MG: 81 TABLET CHEWABLE at 08:01

## 2020-06-24 RX ADMIN — TICAGRELOR 90 MG: 90 TABLET ORAL at 20:01

## 2020-06-24 RX ADMIN — LEVETIRACETAM 1000 MG: 100 SOLUTION ORAL at 08:02

## 2020-06-24 RX ADMIN — HEPARIN SODIUM 5000 UNITS: 5000 INJECTION, SOLUTION INTRAVENOUS; SUBCUTANEOUS at 15:18

## 2020-06-24 RX ADMIN — HEPARIN SODIUM 5000 UNITS: 5000 INJECTION, SOLUTION INTRAVENOUS; SUBCUTANEOUS at 08:02

## 2020-06-24 RX ADMIN — Medication 12.5 MG: at 20:02

## 2020-06-24 RX ADMIN — Medication 2 PACKET: at 08:04

## 2020-06-24 RX ADMIN — Medication 40 MG: at 08:02

## 2020-06-24 RX ADMIN — CHLORHEXIDINE GLUCONATE 0.12% ORAL RINSE 15 ML: 1.2 LIQUID ORAL at 08:02

## 2020-06-24 RX ADMIN — ATORVASTATIN CALCIUM 10 MG: 10 TABLET, FILM COATED ORAL at 20:02

## 2020-06-24 RX ADMIN — POTASSIUM CHLORIDE 20 MEQ: 1.5 POWDER, FOR SOLUTION ORAL at 10:39

## 2020-06-24 RX ADMIN — TICAGRELOR 90 MG: 90 TABLET ORAL at 08:02

## 2020-06-24 RX ADMIN — Medication 40 MG: at 20:01

## 2020-06-24 RX ADMIN — MICONAZOLE NITRATE: 20 POWDER TOPICAL at 08:03

## 2020-06-24 RX ADMIN — LISINOPRIL 5 MG: 5 TABLET ORAL at 08:02

## 2020-06-24 RX ADMIN — LOPERAMIDE HCL 2 MG: 1 SOLUTION ORAL at 22:08

## 2020-06-24 RX ADMIN — METOCLOPRAMIDE HYDROCHLORIDE 10 MG: 5 SOLUTION ORAL at 19:52

## 2020-06-24 RX ADMIN — MELATONIN TAB 3 MG 10 MG: 3 TAB at 22:04

## 2020-06-24 RX ADMIN — FUROSEMIDE 20 MG: 20 TABLET ORAL at 08:02

## 2020-06-24 ASSESSMENT — ACTIVITIES OF DAILY LIVING (ADL)
ADLS_ACUITY_SCORE: 22
ADLS_ACUITY_SCORE: 22
ADLS_ACUITY_SCORE: 20
ADLS_ACUITY_SCORE: 22

## 2020-06-24 ASSESSMENT — MIFFLIN-ST. JEOR
SCORE: 1712.75
SCORE: 1714.75

## 2020-06-24 NOTE — PROGRESS NOTES
Care Coordinator - Discharge Planning    Admission Date/Time:  5/17/2020  Attending MD:  Scot Matamoros MD     Data  Date of initial CC assessment:    Chart reviewed, discussed with interdisciplinary team.   Patient was admitted for:   1. Coronary artery disease due to lipid rich plaque    2. ST elevation MI (STEMI) (H)    3. Cardiac arrest (H)    4. Cardiogenic shock (H)    5. Posturing episode          Coordination of Care and Referrals: Per Cardiology, Pt remains stable to transfer to LTACH. [phone] who states Pt is on the list but no bed available.   Golden will call and update me when bed is available.      Plan  Anticipated Discharge Date: TBD  Anticipated Discharge Plan:  [facility]    Sherley Santiago RN   6B care coordinator #[phone]

## 2020-06-24 NOTE — PROGRESS NOTES
Interventional Cardiology Progress Note      Assessment & Plan:  Scot Bishop is a 39 year old male with no known significant past medical history who presented to [facility] on 5/17/20 following a PEA/VF arrest and anterior STEMI. He was emergently taken to the cath lab and promptly placed on VA ECMO. Angiography revealed a thrombotic occlusion of the LAD successfully treated with aspiration thrombectomy and PCI w/NAZIA of proximal LAD.     Important dates  - Rewarmed 5/19  - De-cannulated 5/19  - IABP discontinued 5/20  - Trach 6/8   - PEG 6/9     # Hypoxic Brain injury  Initial CT head negative. Cooled to 34 degrees on arrival, rewarmed 5/19. EEG on 5/25 showed severe diffuse encephalopathy without seizures or epileptiform discharges. Repeat CTH on 5/25 showed multifocal acute/subacute cerebral infarcts. Brain MRI 5/26 showed multiple diffuse infarcts consistent with hypoxic brain injury. Concerns for LUE posturing and lack of LLE movement, repeat CTH 6/3 showed no acute changes. Trach placed 6/8. Moving all extremities spontaneously although nothing to command. PEG Occasionally making eye contact and tracking at times.   - Continue Seroquel 100 qPM  - now off 1:1 x24h. No indication to resume    # Refractory VF arrest s/p NAZIA to pLAD  # Sinus Tachycardia   Pt reportedly had chest pain that started on 5/16/20. He was using Drano on his pipes at home and did not initially seek medical attention for CP and SOB since it said on the box that Drano can cause those symptoms. He took a shower and had syncopal episode after coming out of the shower. EMS was called; initial rhythm asystole. With chest compressions pt eventually had PEA and then eventually had VF in the field. After multiple cycles of epinephrine had ROSC. He was intubated in the field. Pt was brought to [facility] ED where he regained a pulse and was brought to the Cath Lab for coronary angiogram. Initially, BP was 90s/60s as he was being transported from ED but  he had recurrent cardiac arrest on arrival to Cath Lab. ECG showed ST elevation in aVR. He was promptly placed on VA ECMO. He had thrombotic occlusion of proximal LAD and underwent successful aspiration thrombectomy and PCI w/ NAZIA of proximal and mid LAD. Patient remains in NSR with rates in the 110-120's. Suspect ongoing tachycardia d/t evolving MI and post-arrest state. TTE 6/10 showed LVEF 36% with LAD territory akinesis, RV normal. Due to emesis, all non-essential meds were held 6/18. All medications resumed 6/20, tolerating well. Patient had 23 second run of VT overnight on 5/27, now with rare PVCs.  - Decannulated 5/19  - IABP discontinued 5/20  - Continue aspirin 81 mg and ticagrelor 90 mg BID  - Continue metoprolol tartrate 12.5 mg BID  - Continue lisinopril 5 mg daily   - Continue atorvastatin 10 mg daily   - lasix 20 mg BID      # Aspiration pneumonia - resolved   # Leukocytosis  GNR on sputum cultures, blood cultures 6/1 grew GPCs, sputum cultures 6/2 grew non-lactose fermenting GNR. ID consulted for persistent fevers, re-cultured on 6/13 for low grade temp and elevated WBC, blood culture negative, sputum grew acinetobacter. Per ID, empirically treating with zosyn x 7 days (end date 6/20). Repeat c-diff and UA negative. CXR unchanged. CT negative for other source of infection. Remains afebrile, WBC very mildly elevated at 11.2 - likely acute reactant due to hypoxic injury.   - s/p 7 days of zosyn (end date 6/20)  - Continue to monitor closely for signs of infection given lines     # Acute hypoxic respiratory failure - resolved  Admission CT chest showed bilateral consolidations consistent with aspiration PNA. Patient experienced thick secretions that required bagging on 5/22. Bronchoscopy performed 5/21. Pulmonary was following, now signed off. Significant pressure injury of tongue from ET tube with splitting. CXR on 6/15 showed stable cervical subcutaneous emphysema. CT demonstrated crepitus extending to  mediastinum and pectoralis, NTD. Trach placed 6/8, tolerating room air. Sputum culture grew GNR, vanc/zosyn added 6/4. Zosyn restarted 6/14 for ongoing low grade temp and elevated WBC, non-lactose fermenting GNR in sputum, zosyn re-started for 7-day course. 100.1 temp 6/18, re-cultured. COVID negative.   - zosyn as above  - continue mucomyst and albuterol nebs PRN  - ENT was consulted for pressure injury of tongue, plan for follow up as OP for possible surgical repair     # Elevated lipase - improving  Lipase 487 6/21. CT abdomen/chest/pelvis and abdominal ultrasound unremarkable. Likely acute phase reactant. No s/s of acute pancreatitis.   - Continue to monitor closely    Resolved hospital problems  # Hypernatremia, resolved   Na levels 149 on 6/17.  - Decrease FWF to 60 ml q4h  - Daily BMP    # Acute kidney injury, resolved    Secondary to arrest. Creatinine peaked 1.9, now returned to baseline.  - Continue  ml q2h  - Monitor I/O's  - Replace lytes per protocol    # Thrombocytosis, resolved   Receiving aspirin and ticagrelor for NAZIA. Transfused 1 unit PRBCs 5/21. Plts 258 and Hgb 10 today. No signs of active bleeding. Right inguinal hematoma on US at ECMO cannulae insertion site, ongoing serosanguinous oozing from lateral aspect of site.   - Continue to monitor     # Shock liver, resolved   LFTs normal    #Emesis -resolved  Recurrent emesis since PEG tube placement and TF. Abdominal xray and ultrasound unremarkable. Loose stools which are not new. Liver enzymes normal, T bili 0.4. Lipase 487 today. Placed NJ for relief, patient pulled out from non-purposeful movements. No obvious source for recurrent emesis on CT chest/abdomen/pelvis. Currently tolerating TF at goal 60 ml/hr. Scheduled zofran and reglan for relief. Plan to transition to G-J tube on 7/21. All non-essential meds were discontinued (metoprolol, lisinopril, lasix) on 6/18 to help aid with relief. No episodes of vomiting since 6/17. Tolerating TF  "at goal 60 ml/hr. All medications resumed 6/20, tolerating well via PEG.   - zofran q6h to PRN  - Change reglan BID to PRN   - Protonix BID (for upper GIB which has now resolved)      Prophylaxis: subcutaneous heparin  GI: Protonix  DVT: see above    Diet: TF at 60 ml/hr   Activity: full assist  Code Status: Full   Disposition: [facility] pending bed placement. Barriers include 1:1 sitter.     Patient discussed w/ Dr. Newby.      Betito Sanders PA-C  [facility] Cardiology Team      Interval History:    - no acute events ON. Off sitter for about 48h  - intermittently attends to voice, but does not follow commands.     Most recent vital signs:  /78 (BP Location: Left arm)   Pulse 98   Temp 97.6  F (36.4  C) (Axillary)   Resp 16   Ht 1.676 m (5' 6\")   Wt 86.2 kg (190 lb 0.6 oz)   SpO2 100%   BMI 30.67 kg/m    Temp:  [97.4  F (36.3  C)-97.9  F (36.6  C)] 97.6  F (36.4  C)  Heart Rate:  [100-106] 106  Resp:  [12-18] 16  BP: (100-142)/(58-78) 112/78  FiO2 (%):  [21 %] 21 %  SpO2:  [98 %-100 %] 100 %  Wt Readings from Last 2 Encounters:   06/24/20 86.2 kg (190 lb 0.6 oz)       Intake/Output Summary (Last 24 hours) at 6/24/2020 0841  Last data filed at 6/24/2020 0800  Gross per 24 hour   Intake 1807.5 ml   Output 495 ml   Net 1312.5 ml       Physical exam:  General: In bed, in NAD  HEENT: EOMI, no scleral icterus or injection  CARDIAC: RRR, no m/r/g appreciated. Peripheral pulses 2+  RESP: CTAB, no wheezes, rhonchi or crackles appreciated. Trach dome.   GI: NABS, NT/ND, no guarding or rebound  : no scrotal excoriation noted  EXTREMITIES: NO LE edema, pulses 2+. Femoral access site w/o bleeding, dressing c/d/i. No bruits appreciated.   SKIN: No acute lesions appreciated. Lines CDI  NEURO: Non-purposeful movements. Moving extremities and opening eyes, although nothing to command. Withdraws to pain. Occasionally making eye contact attending to voice.     Drains and Tubes: All drain and tube sites are all benign, no " signs of infection  Access: PICC line     Labs (Past three days):  CBC  Recent Labs   Lab 06/24/20  0501 06/23/20  0504 06/22/20  0442 06/21/20  0323   WBC 11.2* 11.2* 10.9 9.4   RBC 3.80* 3.86* 3.76* 3.27*   HGB 11.2* 11.3* 11.1* 9.4*   HCT 36.6* 37.2* 36.7* 31.7*   MCV 96 96 98 97   MCH 29.5 29.3 29.5 28.7   MCHC 30.6* 30.4* 30.2* 29.7*   RDW 14.2 14.1 13.9 13.7    280 246 214     BMP  Recent Labs   Lab 06/24/20  0501 06/23/20  0504 06/22/20  0442 06/21/20  1555 06/21/20  0323  06/20/20  0352  06/19/20  0449  06/18/20  0541    136 138 138 137   < > 144   < > 147*  148*   < > 148*   POTASSIUM 3.6 3.8 3.7  --  3.5   < > 3.3*   < > 3.3*   < > 3.4   CHLORIDE 102 103 106  --  106  --  113*  --  116*   < > 115*   CO2 29 27 24  --  25  --  25  --  26   < > 27   ANIONGAP 6 6 8  --  7  --  6  --  5   < > 6   * 107* 107*  --  171*  --  119*  --  115*   < > 94   BUN 29 29 26  --  22  --  22  --  21   < > 22   CR 0.84 0.78 0.90  --  0.93  --  1.03  --  1.18   < > 1.15   GFRESTIMATED >90 >90 >90  --  >90  --  90  --  76   < > 79   GFRESTBLACK >90 >90 >90  --  >90  --  >90  --  88   < > >90   TEN 9.7 9.7 9.1  --  8.6  --  9.0  --  9.3   < > 9.6   MAG  --   --  1.9  --  2.0  --  1.9  --  2.1  --  2.1   PHOS  --   --   --   --   --   --  3.6  --  2.8  --  3.7    < > = values in this interval not displayed.     Troponins:   Lab Results   Component Value Date    TROPI 0.099 (H) 06/01/2020    TROPI 0.153 (HH) 05/31/2020    TROPI 0.218 (HH) 05/30/2020    TROPI 0.348 (HH) 05/29/2020    TROPI 0.552 () 05/28/2020        INRNo lab results found in last 7 days.  Liver panel  Recent Labs   Lab 06/18/20  1158   PROTTOTAL 8.1   ALBUMIN 3.0*   BILITOTAL 0.4   ALKPHOS 119   AST 31   ALT 54       Imaging/procedure results:  EKG 12 Lead: 6/19/20      ECHO: 6/10/20  Interpretation Summary  LVEF 36% based on biplane 2D tracing.  LAD territory akinesis.  Right ventricular function, chamber size, wall motion, and thickness  are  normal.  The inferior vena cava is normal.  No pericardial effusion is present.  There has been no change.    5/26/20  Interpretation Summary  Limited study to evaluate biventricular function.     Left ventricular size is normal. The Ejection Fraction is estimated at 35-40%.  The mid to distal anterolateral wall, the apex and distal septum is akinetic  consistent with LAD territory injury.  Right ventricular function, chamber size, wall motion, and thickness are  normal.  No pericardial effusion is present.     Compared to prior study EF appears grossly similar.    Coronary Angiogram: 5/17/20  Conclusion     Acute anterolateral myocardial infarction, with out of hospital cardiac arrest, ROSC, and recurrent arrest on arrival to cath lab    Single vessel CAD with thrombotic occlusion of proximal LAD    Successful aspiration thrombectomy of the proximal LAD    Successful stenting of proximal-mid LAD with NAZIA (3.0x20 Synergy), post-dilation to 4.0 mm guided by IVUS.    Successful POBA (with final kissing balloon) of ostial D1    Cardiac resuscitation with inotropic therapy    CPR    Successful placement of periperhal V-A ECMO with Cardiohelp (RFA, RFV)    Successful placement of Zoll Quattro cooling catheter for Thermoguard hypothermia    Successful placement of RT radial arterial line    IVUS was performed on the proximal/mid LAD post stenting    Successful insertion of distal antegrade Flex sheath into RT SFA          ATTENDING NOTE:  Patient has been seen and evaluated by me on 06/24/2020. I have reviewed the documentation above.  I have reviewed today's vital signs, medications, labs, and imaging results.  I have reviewed and edited, as necessary, the history, review of systems, physical examination, and assessment and plan.  I have discussed my assessment and plan with the physician assistant.  I have seen and examined the patient today as part of a shared visit with Demetrio Sanders PA-C.  Scot Bishop is a 39  year old male with risk factor profile (-) HTN, (-) DM, (-) hypercholesterolemia, (-) tobacco use, (-) fam Hx premature CAD, presented to [facility] on 5/16/20 with asystole followed by CPR followed by PEA followed by VF.  He had multiple cycles of Epi and CPR and had ROSC on arrival to [facility].  He underwent ECMO and emergent coronary angiography showing single vessel thrombotic proximal LAD lesion which was treated by aspiration thrombectomy followed by 3.0x20 Synergy (post dilated to 4.0 mm with IVUS guidance).  Hypothermia with Thermoguard.  Decannulated on 5/19/20, trach on 6/8/20, PEG tube 6/9/20.  ECHO showed LVEF 36%, no qualification for Lifevest.  No recent ventricular arrhythmias.  Hospital course complicated by anoxic brain injury.  He has recovered from LOWELL (peak Cr 1.9), now at 0.8 mg/dl.  He had aspiration pneumonia and has completed 2 courses of IV Abx (Zosyn).  No fever.  WBC 11.2.   He continues on tube feeding     Chadd Newby MD     Cardiovascular Division

## 2020-06-24 NOTE — PLAN OF CARE
"Neuro: Patient was more alert in the evening hours with fewer movements of the extremities.  After the bedtime meds were given the patient slept well throughout the entire night.  NO SITTER  or MITTS were used overnight. Pupils round and reactive.  More eyes movements and fewer random extremity movements.  Cardiac: ST -110. VSS.  /72 (BP Location: Left arm)   Pulse 98   Temp 97.8  F (36.6  C) (Axillary)   Resp 18   Ht 1.676 m (5' 6\")   Wt 86.2 kg (190 lb 0.6 oz)   SpO2 99%   BMI 30.67 kg/m     Respiratory: Sating above % on trach dome at 21% FIO2 No trach suctioning overnight. Lung sounds were coarse/diminished.  Trach Tamara #6. Trach cares completed. Pt was able to cough up small amount of thick, dark yellow secretions overnight.  GI/: Adequate urine output.  Incontinent times three overnight.  No BM  Diet/appetite: Tolerating TF diet at 60 ml/hr and 60 ml water flush Q 4 hr. NPO.  TF was increased to 50 ml/hr at 2100 and to 60 ml/hr at 2300.  Patient is currently at goal.  No s/sx of nausea overnight.  No vomiting.  Activity:  Assist of Q 2 hr with repositioning in bed.  Patient makes frequent shift changes in bed. Boots and compressions sleeves were removed and reordered due to being soiled.  Pt slept very well without them with less fidgeting.  Pain: At acceptable level on current regimen. No non verbal indicators of pain  Skin: No change overnight  LDA's: Left PIV-SL.  G-tube for tube feed.  Anthony #6 Trach on trach dome.     Plan: Continue with POC. Notify primary team with changes.           "

## 2020-06-24 NOTE — PLAN OF CARE
"Neuro: Awake/alert all day, pupils round and reactive. No tracking was noticed.   Cardiac: Sinus tachycardia, 's. SBP 's.    Respiratory: Sating >98% on trach dome 21%, coarse/diminished. Required suctioning x1-tolerated.   GI/: Adequate urine output via condom cath. Multiple liquid stools. PRN imodium given x1.   Diet/appetite: Tolerating TF at 60 ml/hr with 60 q4 FWF via Gtube.   Activity:  Assist of 2 with repositioning, pt moves independently-mitts remained off throughout the day. On pulsate mattress.   Pain: No signs of discomfort.   Skin: Barrier cream applied to groin/coccyx/scrotum. Abrasions on face.   LDA's: L PIV, SL.     Plan: Continue with POC. Notify primary team with changes.  BP 99/63 (BP Location: Left arm)   Pulse 98   Temp 97.5  F (36.4  C) (Axillary)   Resp 16   Ht 1.676 m (5' 6\")   Wt 86.2 kg (190 lb 0.6 oz)   SpO2 100%   BMI 30.67 kg/m        "

## 2020-06-25 VITALS
WEIGHT: 189.6 LBS | HEIGHT: 66 IN | BODY MASS INDEX: 30.47 KG/M2 | RESPIRATION RATE: 18 BRPM | OXYGEN SATURATION: 99 % | HEART RATE: 98 BPM | TEMPERATURE: 96.2 F | DIASTOLIC BLOOD PRESSURE: 68 MMHG | SYSTOLIC BLOOD PRESSURE: 105 MMHG

## 2020-06-25 LAB
ALT SERPL W P-5'-P-CCNC: 45 U/L (ref 0–70)
ANION GAP SERPL CALCULATED.3IONS-SCNC: 13 MMOL/L (ref 3–14)
AST SERPL W P-5'-P-CCNC: 27 U/L (ref 0–45)
BUN SERPL-MCNC: 29 MG/DL (ref 7–30)
CALCIUM SERPL-MCNC: 9.7 MG/DL (ref 8.5–10.1)
CHLORIDE SERPL-SCNC: 102 MMOL/L (ref 94–109)
CO2 SERPL-SCNC: 24 MMOL/L (ref 20–32)
CREAT SERPL-MCNC: 0.76 MG/DL (ref 0.66–1.25)
ERYTHROCYTE [DISTWIDTH] IN BLOOD BY AUTOMATED COUNT: 14.3 % (ref 10–15)
GFR SERPL CREATININE-BSD FRML MDRD: >90 ML/MIN/{1.73_M2}
GLUCOSE SERPL-MCNC: 98 MG/DL (ref 70–99)
HCT VFR BLD AUTO: 34.7 % (ref 40–53)
HGB BLD-MCNC: 10.6 G/DL (ref 13.3–17.7)
MCH RBC QN AUTO: 29.4 PG (ref 26.5–33)
MCHC RBC AUTO-ENTMCNC: 30.5 G/DL (ref 31.5–36.5)
MCV RBC AUTO: 96 FL (ref 78–100)
PLATELET # BLD AUTO: 336 10E9/L (ref 150–450)
POTASSIUM SERPL-SCNC: 3.8 MMOL/L (ref 3.4–5.3)
RBC # BLD AUTO: 3.6 10E12/L (ref 4.4–5.9)
SODIUM SERPL-SCNC: 139 MMOL/L (ref 133–144)
WBC # BLD AUTO: 11.5 10E9/L (ref 4–11)

## 2020-06-25 PROCEDURE — 25000132 ZZH RX MED GY IP 250 OP 250 PS 637: Performed by: INTERNAL MEDICINE

## 2020-06-25 PROCEDURE — 27210429 ZZH NUTRITION PRODUCT INTERMEDIATE LITER

## 2020-06-25 PROCEDURE — 99238 HOSP IP/OBS DSCHRG MGMT 30/<: CPT | Performed by: NURSE PRACTITIONER

## 2020-06-25 PROCEDURE — 84460 ALANINE AMINO (ALT) (SGPT): CPT | Performed by: INTERNAL MEDICINE

## 2020-06-25 PROCEDURE — 36415 COLL VENOUS BLD VENIPUNCTURE: CPT | Performed by: INTERNAL MEDICINE

## 2020-06-25 PROCEDURE — 25000132 ZZH RX MED GY IP 250 OP 250 PS 637: Performed by: STUDENT IN AN ORGANIZED HEALTH CARE EDUCATION/TRAINING PROGRAM

## 2020-06-25 PROCEDURE — 84450 TRANSFERASE (AST) (SGOT): CPT | Performed by: INTERNAL MEDICINE

## 2020-06-25 PROCEDURE — 25000128 H RX IP 250 OP 636: Performed by: STUDENT IN AN ORGANIZED HEALTH CARE EDUCATION/TRAINING PROGRAM

## 2020-06-25 PROCEDURE — 93005 ELECTROCARDIOGRAM TRACING: CPT

## 2020-06-25 PROCEDURE — 93010 ELECTROCARDIOGRAM REPORT: CPT | Performed by: INTERNAL MEDICINE

## 2020-06-25 PROCEDURE — 25800030 ZZH RX IP 258 OP 636: Performed by: STUDENT IN AN ORGANIZED HEALTH CARE EDUCATION/TRAINING PROGRAM

## 2020-06-25 PROCEDURE — 25000132 ZZH RX MED GY IP 250 OP 250 PS 637: Performed by: PHYSICIAN ASSISTANT

## 2020-06-25 PROCEDURE — 80048 BASIC METABOLIC PNL TOTAL CA: CPT | Performed by: INTERNAL MEDICINE

## 2020-06-25 PROCEDURE — 25000132 ZZH RX MED GY IP 250 OP 250 PS 637: Performed by: NURSE PRACTITIONER

## 2020-06-25 PROCEDURE — 25000132 ZZH RX MED GY IP 250 OP 250 PS 637

## 2020-06-25 PROCEDURE — 85027 COMPLETE CBC AUTOMATED: CPT | Performed by: INTERNAL MEDICINE

## 2020-06-25 PROCEDURE — 25000128 H RX IP 250 OP 636: Performed by: NURSE PRACTITIONER

## 2020-06-25 RX ORDER — PHENOL 1.4 %
10 AEROSOL, SPRAY (ML) MUCOUS MEMBRANE AT BEDTIME
Qty: 90 TABLET | Refills: 3 | DISCHARGE
Start: 2020-06-25 | End: 2021-01-19

## 2020-06-25 RX ORDER — LEVALBUTEROL INHALATION SOLUTION 0.63 MG/3ML
0.63 SOLUTION RESPIRATORY (INHALATION) EVERY 4 HOURS PRN
Qty: 3 ML | Refills: 3 | DISCHARGE
Start: 2020-06-25 | End: 2021-01-19

## 2020-06-25 RX ORDER — LEVETIRACETAM 100 MG/ML
1000 SOLUTION ORAL 2 TIMES DAILY
Qty: 100 ML | Refills: 0
Start: 2020-06-25 | End: 2021-01-19

## 2020-06-25 RX ORDER — ATORVASTATIN CALCIUM 10 MG/1
10 TABLET, FILM COATED ORAL EVERY EVENING
Qty: 90 TABLET | Refills: 3 | DISCHARGE
Start: 2020-06-25 | End: 2021-01-19

## 2020-06-25 RX ORDER — FUROSEMIDE 20 MG
20 TABLET ORAL
Qty: 60 TABLET | Refills: 3 | DISCHARGE
Start: 2020-06-25 | End: 2021-01-19

## 2020-06-25 RX ORDER — LISINOPRIL 5 MG/1
5 TABLET ORAL DAILY
Qty: 60 TABLET | Refills: 3 | DISCHARGE
Start: 2020-06-26 | End: 2021-01-19

## 2020-06-25 RX ORDER — METOPROLOL TARTRATE 25 MG/1
25 TABLET, FILM COATED ORAL 2 TIMES DAILY
Status: DISCONTINUED | OUTPATIENT
Start: 2020-06-25 | End: 2020-06-25 | Stop reason: HOSPADM

## 2020-06-25 RX ORDER — FUROSEMIDE 20 MG
20 TABLET ORAL DAILY
Qty: 30 TABLET | Refills: 0
Start: 2020-06-25 | End: 2021-01-19

## 2020-06-25 RX ORDER — LORAZEPAM 2 MG/ML
1 INJECTION INTRAMUSCULAR
Status: DISCONTINUED | OUTPATIENT
Start: 2020-06-25 | End: 2020-06-25 | Stop reason: HOSPADM

## 2020-06-25 RX ORDER — HEPARIN SODIUM 5000 [USP'U]/.5ML
5000 INJECTION, SOLUTION INTRAVENOUS; SUBCUTANEOUS EVERY 8 HOURS
Qty: 30 SYRINGE | Refills: 3 | DISCHARGE
Start: 2020-06-25 | End: 2021-01-19

## 2020-06-25 RX ORDER — AMINO AC/PROTEIN HYDR/WHEY PRO 10G-100/30
2 LIQUID (ML) ORAL DAILY
Qty: 45 ML | Refills: 1 | DISCHARGE
Start: 2020-06-26 | End: 2021-01-19

## 2020-06-25 RX ORDER — ASPIRIN 81 MG/1
81 TABLET, CHEWABLE ORAL DAILY
Qty: 90 TABLET | Refills: 3 | DISCHARGE
Start: 2020-06-26 | End: 2021-04-06

## 2020-06-25 RX ORDER — LOPERAMIDE HCL 1 MG/7.5ML
2 SUSPENSION ORAL 3 TIMES DAILY PRN
Qty: 118 ML | Refills: 1 | DISCHARGE
Start: 2020-06-25 | End: 2021-01-19

## 2020-06-25 RX ORDER — QUETIAPINE FUMARATE 100 MG/1
100 TABLET, FILM COATED ORAL AT BEDTIME
Qty: 30 TABLET | Refills: 0 | DISCHARGE
Start: 2020-06-25 | End: 2021-01-19

## 2020-06-25 RX ORDER — ACETAMINOPHEN 325 MG/1
650 TABLET ORAL EVERY 6 HOURS PRN
Qty: 90 TABLET | Refills: 1 | DISCHARGE
Start: 2020-06-25 | End: 2021-05-05

## 2020-06-25 RX ORDER — IPRATROPIUM BROMIDE AND ALBUTEROL SULFATE 2.5; .5 MG/3ML; MG/3ML
3 SOLUTION RESPIRATORY (INHALATION) EVERY 4 HOURS PRN
Qty: 3 ML | Refills: 1 | DISCHARGE
Start: 2020-06-25 | End: 2021-01-19

## 2020-06-25 RX ORDER — METOPROLOL TARTRATE 25 MG/1
25 TABLET, FILM COATED ORAL 2 TIMES DAILY
Qty: 90 TABLET | Refills: 3 | DISCHARGE
Start: 2020-06-25 | End: 2021-02-19

## 2020-06-25 RX ORDER — METOCLOPRAMIDE HYDROCHLORIDE 5 MG/5ML
10 SOLUTION ORAL 2 TIMES DAILY PRN
Qty: 120 ML | Refills: 1 | DISCHARGE
Start: 2020-06-25 | End: 2021-01-19

## 2020-06-25 RX ORDER — QUETIAPINE FUMARATE 100 MG/1
100 TABLET, FILM COATED ORAL AT BEDTIME
Qty: 30 TABLET | Refills: 0 | DISCHARGE
Start: 2020-06-25 | End: 2020-06-25

## 2020-06-25 RX ADMIN — LOPERAMIDE HCL 2 MG: 1 SOLUTION ORAL at 08:17

## 2020-06-25 RX ADMIN — ACETAMINOPHEN 650 MG: 325 TABLET, FILM COATED ORAL at 13:01

## 2020-06-25 RX ADMIN — ASPIRIN 81 MG CHEWABLE TABLET 81 MG: 81 TABLET CHEWABLE at 08:02

## 2020-06-25 RX ADMIN — LISINOPRIL 5 MG: 5 TABLET ORAL at 08:02

## 2020-06-25 RX ADMIN — HEPARIN SODIUM 5000 UNITS: 5000 INJECTION, SOLUTION INTRAVENOUS; SUBCUTANEOUS at 08:02

## 2020-06-25 RX ADMIN — ONDANSETRON 4 MG: 2 INJECTION INTRAMUSCULAR; INTRAVENOUS at 08:01

## 2020-06-25 RX ADMIN — LEVETIRACETAM 1000 MG: 100 SOLUTION ORAL at 08:02

## 2020-06-25 RX ADMIN — Medication 2 PACKET: at 08:03

## 2020-06-25 RX ADMIN — CHLORHEXIDINE GLUCONATE 0.12% ORAL RINSE 15 ML: 1.2 LIQUID ORAL at 08:02

## 2020-06-25 RX ADMIN — MICONAZOLE NITRATE: 20 POWDER TOPICAL at 08:03

## 2020-06-25 RX ADMIN — FUROSEMIDE 20 MG: 20 TABLET ORAL at 13:01

## 2020-06-25 RX ADMIN — FUROSEMIDE 20 MG: 20 TABLET ORAL at 08:02

## 2020-06-25 RX ADMIN — Medication 12.5 MG: at 08:02

## 2020-06-25 RX ADMIN — SODIUM CHLORIDE 500 ML: 9 INJECTION, SOLUTION INTRAVENOUS at 00:38

## 2020-06-25 RX ADMIN — TICAGRELOR 90 MG: 90 TABLET ORAL at 08:02

## 2020-06-25 RX ADMIN — HEPARIN SODIUM 5000 UNITS: 5000 INJECTION, SOLUTION INTRAVENOUS; SUBCUTANEOUS at 00:00

## 2020-06-25 RX ADMIN — Medication 40 MG: at 08:02

## 2020-06-25 RX ADMIN — ACETAMINOPHEN 650 MG: 325 TABLET, FILM COATED ORAL at 00:00

## 2020-06-25 ASSESSMENT — ACTIVITIES OF DAILY LIVING (ADL)
ADLS_ACUITY_SCORE: 22
ADLS_ACUITY_SCORE: 18
ADLS_ACUITY_SCORE: 22

## 2020-06-25 NOTE — PROGRESS NOTES
Interventional Cardiology Progress Note      Assessment & Plan:  Scot Bishop is a 39 year old male with no known significant past medical history who presented to [facility] on 5/17/20 following a PEA/VF arrest and anterior STEMI. He was emergently taken to the cath lab and promptly placed on VA ECMO. Angiography revealed a thrombotic occlusion of the LAD successfully treated with aspiration thrombectomy and PCI w/NAZIA of proximal LAD.      Important dates  - VA ECMO 5/17- 5/19  - IABP discontinued 5/20  - Trach 6/8   - PEG 6/9      # Hypoxic Brain injury  Initial CT head negative. Cooled to 34 degrees on arrival, rewarmed 5/19. EEG on 5/25 showed severe diffuse encephalopathy without seizures or epileptiform discharges. Repeat CTH on 5/25 showed multifocal acute/subacute cerebral infarcts. Brain MRI 5/26 showed multiple diffuse infarcts consistent with hypoxic brain injury. Concerns for LUE posturing and lack of LLE movement, repeat CTH 6/3 showed no acute changes. Trach placed 6/8. Moving all extremities spontaneously although nothing to command. 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 He was using Drano on his pipes at home and did not initially seek medical attention for CP and SOB since it said on the box that Drano can cause those symptoms. He took a shower and had syncopal episode after coming out of the shower. EMS was called; initial rhythm asystole,after several rounds CPR, --> PEA --> VF in the field. After multiple cycles of epinephrine had ROSC. He was intubated in the field. BP was 90s/60s as he was being transported from ED but he had recurrent cardiac arrest on arrival to Cath Lab. ECG showed ST elevation in aVR. He was promptly placed on VA ECMO. He had thrombotic occlusion of proximal LAD and underwent  successful aspiration thrombectomy and PCI w/ NAZIA of proximal and mid LAD. Patient remains in NSR with rates in the 110-120's. Suspect ongoing tachycardia d/t evolving MI and post-arrest state. TTE 6/10 showed LVEF 36% with LAD territory akinesis, RV normal. Patient now on GDMT. 5/27, pt had 23 second run of VT, now ST with rare PVCs.    - Decannulated 5/19  - IABP discontinued 5/20  - DAPT: Aspirin 81 mg (lifelong) and ticagrelor 90 mg BID (x 1 year)  - BB: Metoprolol tartrate 25 mg BID (can transition to succinate when off TF, med cannot be crushed)  - ACEi: Lisinopril 5 mg daily, can titrate as OP if BP and HR allow  - Statin: Atorvastatin 10 mg daily   - Diuresis: lasix 20 mg BID   - Daily weights  - I/O     # Aspiration pneumonia - resolved   # Leukocytosis  GNR on sputum cultures, blood cultures 6/1 grew GPCs, sputum cultures 6/2 grew non-lactose fermenting GNR. ID consulted for persistent fevers, re-cultured on 6/13 for low grade temp and elevated WBC, blood culture negative, sputum grew acinetobacter. Per ID, empirically treating with zosyn x 7 days (end date 6/20). Repeat c-diff and UA negative. CXR unchanged. CT negative for other source of infection. Remains afebrile, WBC very mildly elevated at 11.2 - likely acute reactant due to hypoxic injury.   - s/p 7 days of zosyn (end date 6/20)  - Continue to monitor closely for signs of infection given lines      # Acute hypoxic respiratory failure - resolved  Admission CT chest showed bilateral consolidations consistent with aspiration PNA. Patient experienced thick secretions that required bagging on 5/22. Bronchoscopy performed 5/21. Pulmonary was following, now signed off. Significant pressure injury of tongue from ET tube with splitting. CXR on 6/15 showed stable cervical subcutaneous emphysema. CT demonstrated crepitus extending to mediastinum and pectoralis, NTD. Trach placed 6/8, tolerating room air. Sputum culture grew GNR, vanc/zosyn added 6/4. Zosyn  restarted 6/14 for ongoing low grade temp and elevated WBC, non-lactose fermenting GNR in sputum, zosyn re-started for 7-day course. 100.1 temp 6/18, re-cultured. COVID negative.   - zosyn as above  - continue mucomyst and albuterol nebs PRN  - ENT was consulted for pressure injury of tongue, plan for follow up as OP for possible surgical repair      # Elevated lipase - improving  Lipase 487 6/21. CT abdomen/chest/pelvis and abdominal ultrasound unremarkable. Likely acute phase reactant. No s/s of acute pancreatitis.   - Continue to monitor closely  - As OP if Lipase normalizes, would increase Lipitor to high intensity     Resolved hospital problems  # Hypernatremia, resolved   Na levels 149 on 6/17.  - FWF to 60 ml q4h  - Daily BMP     # Acute kidney injury, resolved    Secondary to arrest. Creatinine peaked 1.9, now returned to baseline.  - Continue  ml q2h  - Monitor I/O's  - Replace lytes per protocol     # Thrombocytosis, resolved   Receiving aspirin and ticagrelor for NAZIA. Transfused 1 unit PRBCs 5/21. Plts 258 and Hgb 10 today. No signs of active bleeding. Right inguinal hematoma on US at ECMO cannulae insertion site, ongoing serosanguinous oozing from lateral aspect of site.   - Continue to monitor      # Shock liver, resolved   LFTs normal     #Emesis, resolved  Recurrent emesis since PEG tube placement and TF. Abdominal xray and ultrasound unremarkable. Loose stools which are not new. Liver enzymes normal, T bili 0.4. Lipase 487 today. Placed NJ for relief, patient pulled out from non-purposeful movements. No obvious source for recurrent emesis on CT chest/abdomen/pelvis. Currently tolerating TF at goal 60 ml/hr. Scheduled zofran and reglan for relief. Plan to transition to G-J tube on 7/21. All non-essential meds were discontinued (metoprolol, lisinopril, lasix) on 6/18 to help aid with relief. No episodes of vomiting since 6/17. Tolerating TF at goal 60 ml/hr. All medications resumed 6/20,  "tolerating well via PEG.   - zofran q6h to PRN  - Change reglan BID to PRN   - Protonix BID (for upper GIB which has now resolved)       Diet: NPO + TF  Activity: Assist 2  Code Status: Full  Disposition: Medically ready for discharge to [facility] when bed available     Patient  discussed w/ Dr. Moreno.      Tamara Hurst, APRN, CNP  [facility]  Interventional Cardiology  [phone]    Interval History:  - pt s/p 500 mL bolus overnight r/t HR consistently above 115, believed to be 2/2 diarrhea  - HR this am 110-115    Most recent vital signs:  /73 (BP Location: Left arm)   Pulse 98   Temp 98.7  F (37.1  C) (Oral)   Resp 18   Ht 1.676 m (5' 6\")   Wt 86 kg (189 lb 9.5 oz)   SpO2 99%   BMI 30.60 kg/m    Temp:  [97.4  F (36.3  C)-98.7  F (37.1  C)] 98.7  F (37.1  C)  Heart Rate:  [101-120] 109  Resp:  [16-18] 18  BP: ()/(60-73) 105/73  FiO2 (%):  [21 %] 21 %  SpO2:  [98 %-100 %] 99 %  Wt Readings from Last 2 Encounters:   06/24/20 86 kg (189 lb 9.5 oz)       Intake/Output Summary (Last 24 hours) at 6/25/2020 1015  Last data filed at 6/25/2020 0300  Gross per 24 hour   Intake 1263 ml   Output 1050 ml   Net 213 ml       Physical exam:  General: In bed, in NAD  HEENT: EOMI, PERRLA, midline tongue ulceration  CARDIAC: RRR, no m/r/g appreciated. Peripheral pulses 2+  RESP: CTAB, mechanical lung sounds  GI: NABS, NT/ND, no guarding or rebound  : Silva catheter in place  EXTREMITIES: NO LE edema, pulses 2+.   SKIN: No acute lesions appreciated  NEURO: Opens eyes spontaneously, intermittent tracking, does not follow commands    Drains/Tubes/Catheters: Silva Catheter, PEG, Trach  Access: PIV    Labs (Past three days):  CBC  Recent Labs   Lab 06/25/20  0451 06/24/20  0501 06/23/20  0504 06/22/20  0442   WBC 11.5* 11.2* 11.2* 10.9   RBC 3.60* 3.80* 3.86* 3.76*   HGB 10.6* 11.2* 11.3* 11.1*   HCT 34.7* 36.6* 37.2* 36.7*   MCV 96 96 96 98   MCH 29.4 29.5 29.3 29.5   MCHC 30.5* 30.6* " 30.4* 30.2*   RDW 14.3 14.2 14.1 13.9    305 280 246     BMP  Recent Labs   Lab 06/25/20  0451 06/24/20  0501 06/23/20  0504 06/22/20  0442  06/21/20  0323  06/20/20  0352  06/19/20  0449    137 136 138   < > 137   < > 144   < > 147*  148*   POTASSIUM 3.8 3.6 3.8 3.7  --  3.5   < > 3.3*   < > 3.3*   CHLORIDE 102 102 103 106  --  106  --  113*  --  116*   CO2 24 29 27 24  --  25  --  25  --  26   ANIONGAP 13 6 6 8  --  7  --  6  --  5   GLC 98 122* 107* 107*  --  171*  --  119*  --  115*   BUN 29 29 29 26  --  22  --  22  --  21   CR 0.76 0.84 0.78 0.90  --  0.93  --  1.03  --  1.18   GFRESTIMATED >90 >90 >90 >90  --  >90  --  90  --  76   GFRESTBLACK >90 >90 >90 >90  --  >90  --  >90  --  88   TEN 9.7 9.7 9.7 9.1  --  8.6  --  9.0  --  9.3   MAG  --  2.2  --  1.9  --  2.0  --  1.9  --  2.1   PHOS  --   --   --   --   --   --   --  3.6  --  2.8    < > = values in this interval not displayed.     Troponins:   Lab Results   Component Value Date    TROPI 0.099 (H) 06/01/2020    TROPI 0.153 (HH) 05/31/2020    TROPI 0.218 (HH) 05/30/2020    TROPI 0.348 (HH) 05/29/2020    TROPI 0.552 () 05/28/2020        INRNo lab results found in last 7 days.  Liver panel  Recent Labs   Lab 06/25/20  0451 06/18/20  1158   PROTTOTAL  --  8.1   ALBUMIN  --  3.0*   BILITOTAL  --  0.4   ALKPHOS  --  119   AST 27 31   ALT 45 54       Imaging/procedure results:  EKG 12 Lead: ST       ECHO 6/10/2020:  Interpretation Summary  LVEF 36% based on biplane 2D tracing.  LAD territory akinesis.  Right ventricular function, chamber size, wall motion, and thickness are  normal.  The inferior vena cava is normal.  No pericardial effusion is present.  There has been no change.

## 2020-06-25 NOTE — PROGRESS NOTES
Care Coordinator - Discharge Planning    Admission Date/Time:  5/17/2020  Attending MD:  Scot Matamoros MD     Data    Chart reviewed, discussed with interdisciplinary team.   Patient was admitted for:   1. Coronary artery disease due to lipid rich plaque    2. ST elevation MI (STEMI) (H)    3. Cardiac arrest (H)    4. Cardiogenic shock (H)    5. Posturing episode         Assessment   Phone call received from Golden, Clinical Liaison for [facility] stating bed is available for transfer. Golden has asked for transport at 7pm this evening. I have arranged stretcher transport through [employer] #[phone].  I have called and updated Pts Father, Pilo #[phone], he is agreeable with transfer.      Plan  Anticipated Discharge Date:  6/25/20  Anticipated Discharge Plan:  [facility]    CTS Handoff completed:  Yes    Sherley Santiago, RN   6B care coordinator #[phone]

## 2020-06-25 NOTE — PLAN OF CARE
"Neuro:  Pt was alert at the beginning of the shift.  Pt has slept well overnight with a decrease in non-purposeful movements.  Cardiac: ST -110. At midnight assessment the patient's heart rate was consistently greater than 120 with ectopy on the telemetry monitor.  Overnight CSI coverage was notified at 0012 and came to bedside to assess the patient.  500 mL bolus of NS was given.  At 0400 assessment heart returned to patient's baseline of 100-110.  The rest of patient's vitals have been stable overnight. BP 95/60 (BP Location: Left arm)   Pulse 98   Temp 98.2  F (36.8  C) (Oral)   Resp 16   Ht 1.676 m (5' 6\")   Wt 86 kg (189 lb 9.5 oz)   SpO2 100%   BMI 30.60 kg/m    Respiratory: Sating above % on trach dome at 21% FIO2 No trach suctioning overnight. Lung sounds were coarse/diminished.  Trach Shiley #6. Trach cares completed.   GI/: Adequate urine output.  Incontinent times three overnight.  Loose stools at the beginning of the shift; Imodium was given at 11 pm.  Diet/appetite: Tolerating TF diet at 60 ml/hr and 60 ml water flush Q 4 hr. NPO.  Patient is currently at goal.  No s/sx of nausea overnight.  No vomiting.  Activity:  Assist of Q 2 hr with repositioning in bed.  Patient makes frequent shift changes in bed. Boots and compressions sleeves were removed and reordered due to being soiled.  Pt slept very well without them   Pain: At acceptable level on current regimen. No non verbal indicators of pain. One dose of Tylenol was given due to elevated heart rate.  Skin: No change overnight  LDA's: Left PIV infusing 500 mL NS bolus.  G-tube for tube feed.  Shiley #6 Trach on trach dome.     Plan: Continue with POC. Notify primary team with changes.       "

## 2020-06-26 NOTE — PROGRESS NOTES
Report given to RN at [facility]. Pt left via [facility] at 1920. Tele removed. IV in place. Pt awake. Family updated. Trach and oxygen supplies sent with [facility] transporters. PEG tube clamped.

## 2020-07-01 ENCOUNTER — TELEPHONE (OUTPATIENT)
Dept: OTOLARYNGOLOGY | Facility: CLINIC | Age: 40
End: 2020-07-01

## 2020-07-01 NOTE — TELEPHONE ENCOUNTER
Writer called patient and LVM regarding rescheduling appointment with Dr. Waller. Writer left name and call back number.      Valentine Gonsales LPN

## 2020-07-06 ENCOUNTER — TELEPHONE (OUTPATIENT)
Dept: OTOLARYNGOLOGY | Facility: CLINIC | Age: 40
End: 2020-07-06

## 2020-07-06 NOTE — TELEPHONE ENCOUNTER
Writer LEE regarding scheduling follow up with Dr. Waller. Writer provided patient with name and call back number. Awaiting call back.    Valentine Gonsales LPN

## 2020-07-13 ENCOUNTER — TELEPHONE (OUTPATIENT)
Dept: OTOLARYNGOLOGY | Facility: CLINIC | Age: 40
End: 2020-07-13

## 2020-07-13 NOTE — TELEPHONE ENCOUNTER
Writer called in regards to rescheduling patient with Dr. Waller. Writer provided name and call back number. Awaiting call back.    Valentine Gonsales LPN

## 2020-07-23 ENCOUNTER — TELEPHONE (OUTPATIENT)
Dept: OTOLARYNGOLOGY | Facility: CLINIC | Age: 40
End: 2020-07-23

## 2020-07-23 NOTE — TELEPHONE ENCOUNTER
"-An in-basket message was sent, asking to reach out to this patient to see if we can reschedule a consult he had initially scheduled with Dr. Waller on 6-23-20 for an oral lesion.    -Many attempts have been made and voicemails were left, but there has not been a call back from the patient.    -However, writer spoke to the patient's step-mother, Audrey, who reported the following information:    -Audrey reports that the patient is still hospitalized, in-patient, at [facility] in [place] (for continued care following a cardiac issue).  Audrey reports that the staff at the hospital has monitored his oral lesion and she reports that \"it has healed.\"    Writer gave the patient's step mother our clinic phone number and advised her to call us back if his oral lesion should reappear or if he encounters further issues post discharging from the hospital.    Nursing staff was notified.   Uyen Martin RN  "

## 2020-08-03 ENCOUNTER — OFFICE VISIT - HEALTHEAST (OUTPATIENT)
Dept: GERIATRICS | Facility: CLINIC | Age: 40
End: 2020-08-03

## 2020-08-03 DIAGNOSIS — R13.10 DYSPHAGIA, UNSPECIFIED TYPE: ICD-10-CM

## 2020-08-03 DIAGNOSIS — G93.1 HYPOXIC BRAIN INJURY (H): ICD-10-CM

## 2020-08-03 DIAGNOSIS — I46.9 CARDIAC ARREST (H): ICD-10-CM

## 2020-08-04 ENCOUNTER — AMBULATORY - HEALTHEAST (OUTPATIENT)
Dept: ADMINISTRATIVE | Facility: CLINIC | Age: 40
End: 2020-08-04

## 2020-08-04 ENCOUNTER — OFFICE VISIT - HEALTHEAST (OUTPATIENT)
Dept: GERIATRICS | Facility: CLINIC | Age: 40
End: 2020-08-04

## 2020-08-04 ENCOUNTER — COMMUNICATION - HEALTHEAST (OUTPATIENT)
Dept: GERIATRICS | Facility: CLINIC | Age: 40
End: 2020-08-04

## 2020-08-04 ENCOUNTER — AMBULATORY - HEALTHEAST (OUTPATIENT)
Dept: GERIATRICS | Facility: CLINIC | Age: 40
End: 2020-08-04

## 2020-08-04 DIAGNOSIS — G93.1 HYPOXIC BRAIN INJURY (H): ICD-10-CM

## 2020-08-04 DIAGNOSIS — E86.1 HYPOTENSION DUE TO HYPOVOLEMIA: ICD-10-CM

## 2020-08-04 DIAGNOSIS — F41.9 ANXIETY: ICD-10-CM

## 2020-08-04 DIAGNOSIS — I46.9 CARDIAC ARREST (H): ICD-10-CM

## 2020-08-04 DIAGNOSIS — R13.10 DYSPHAGIA, UNSPECIFIED TYPE: ICD-10-CM

## 2020-08-04 DIAGNOSIS — F17.200 TOBACCO USE DISORDER: ICD-10-CM

## 2020-08-05 ENCOUNTER — RECORDS - HEALTHEAST (OUTPATIENT)
Dept: LAB | Facility: CLINIC | Age: 40
End: 2020-08-05

## 2020-08-06 ENCOUNTER — OFFICE VISIT - HEALTHEAST (OUTPATIENT)
Dept: GERIATRICS | Facility: CLINIC | Age: 40
End: 2020-08-06

## 2020-08-06 DIAGNOSIS — E86.1 HYPOTENSION DUE TO HYPOVOLEMIA: ICD-10-CM

## 2020-08-06 DIAGNOSIS — R13.10 DYSPHAGIA, UNSPECIFIED TYPE: ICD-10-CM

## 2020-08-06 DIAGNOSIS — G93.1 HYPOXIC BRAIN INJURY (H): ICD-10-CM

## 2020-08-06 DIAGNOSIS — I46.9 CARDIAC ARREST (H): ICD-10-CM

## 2020-08-06 LAB
ANION GAP SERPL CALCULATED.3IONS-SCNC: 12 MMOL/L (ref 5–18)
BUN SERPL-MCNC: 13 MG/DL (ref 8–22)
CALCIUM SERPL-MCNC: 9.7 MG/DL (ref 8.5–10.5)
CHLORIDE BLD-SCNC: 104 MMOL/L (ref 98–107)
CO2 SERPL-SCNC: 22 MMOL/L (ref 22–31)
CREAT SERPL-MCNC: 0.82 MG/DL (ref 0.7–1.3)
ERYTHROCYTE [DISTWIDTH] IN BLOOD BY AUTOMATED COUNT: 14.2 % (ref 11–14.5)
GFR SERPL CREATININE-BSD FRML MDRD: >60 ML/MIN/1.73M2
GLUCOSE BLD-MCNC: 91 MG/DL (ref 70–125)
HCT VFR BLD AUTO: 34.9 % (ref 40–54)
HGB BLD-MCNC: 11 G/DL (ref 14–18)
MAGNESIUM SERPL-MCNC: 2 MG/DL (ref 1.8–2.6)
MCH RBC QN AUTO: 28.4 PG (ref 27–34)
MCHC RBC AUTO-ENTMCNC: 31.5 G/DL (ref 32–36)
MCV RBC AUTO: 90 FL (ref 80–100)
PLATELET # BLD AUTO: 296 THOU/UL (ref 140–440)
PMV BLD AUTO: 11.5 FL (ref 8.5–12.5)
POTASSIUM BLD-SCNC: 3.5 MMOL/L (ref 3.5–5)
RBC # BLD AUTO: 3.87 MILL/UL (ref 4.4–6.2)
SODIUM SERPL-SCNC: 138 MMOL/L (ref 136–145)
WBC: 8.3 THOU/UL (ref 4–11)

## 2020-08-07 ENCOUNTER — OFFICE VISIT - HEALTHEAST (OUTPATIENT)
Dept: GERIATRICS | Facility: CLINIC | Age: 40
End: 2020-08-07

## 2020-08-07 DIAGNOSIS — G93.1 HYPOXIC BRAIN INJURY (H): ICD-10-CM

## 2020-08-07 DIAGNOSIS — R13.10 DYSPHAGIA, UNSPECIFIED TYPE: ICD-10-CM

## 2020-08-11 ENCOUNTER — OFFICE VISIT - HEALTHEAST (OUTPATIENT)
Dept: GERIATRICS | Facility: CLINIC | Age: 40
End: 2020-08-11

## 2020-08-11 DIAGNOSIS — E86.1 HYPOTENSION DUE TO HYPOVOLEMIA: ICD-10-CM

## 2020-08-11 DIAGNOSIS — I46.9 CARDIAC ARREST (H): ICD-10-CM

## 2020-08-11 DIAGNOSIS — R13.10 DYSPHAGIA, UNSPECIFIED TYPE: ICD-10-CM

## 2020-08-11 DIAGNOSIS — F17.200 TOBACCO USE DISORDER: ICD-10-CM

## 2020-08-11 DIAGNOSIS — G93.1 HYPOXIC BRAIN INJURY (H): ICD-10-CM

## 2020-08-13 ENCOUNTER — OFFICE VISIT - HEALTHEAST (OUTPATIENT)
Dept: GERIATRICS | Facility: CLINIC | Age: 40
End: 2020-08-13

## 2020-08-13 DIAGNOSIS — R13.10 DYSPHAGIA, UNSPECIFIED TYPE: ICD-10-CM

## 2020-08-13 DIAGNOSIS — G93.1 HYPOXIC BRAIN INJURY (H): ICD-10-CM

## 2020-08-13 DIAGNOSIS — E86.1 HYPOTENSION DUE TO HYPOVOLEMIA: ICD-10-CM

## 2020-08-13 DIAGNOSIS — I46.9 CARDIAC ARREST (H): ICD-10-CM

## 2020-08-17 ENCOUNTER — RECORDS - HEALTHEAST (OUTPATIENT)
Dept: LAB | Facility: CLINIC | Age: 40
End: 2020-08-17

## 2020-08-17 ENCOUNTER — OFFICE VISIT - HEALTHEAST (OUTPATIENT)
Dept: GERIATRICS | Facility: CLINIC | Age: 40
End: 2020-08-17

## 2020-08-17 DIAGNOSIS — Z93.4 GASTROJEJUNOSTOMY TUBE STATUS (H): ICD-10-CM

## 2020-08-17 DIAGNOSIS — J96.01 ACUTE RESPIRATORY FAILURE WITH HYPOXIA (H): ICD-10-CM

## 2020-08-18 ENCOUNTER — OFFICE VISIT - HEALTHEAST (OUTPATIENT)
Dept: GERIATRICS | Facility: CLINIC | Age: 40
End: 2020-08-18

## 2020-08-18 DIAGNOSIS — Z93.4 GASTROJEJUNOSTOMY TUBE STATUS (H): ICD-10-CM

## 2020-08-18 DIAGNOSIS — R13.10 DYSPHAGIA, UNSPECIFIED TYPE: ICD-10-CM

## 2020-08-18 DIAGNOSIS — E86.1 HYPOTENSION DUE TO HYPOVOLEMIA: ICD-10-CM

## 2020-08-18 DIAGNOSIS — I46.9 CARDIAC ARREST (H): ICD-10-CM

## 2020-08-18 DIAGNOSIS — G93.1 HYPOXIC BRAIN INJURY (H): ICD-10-CM

## 2020-08-19 ENCOUNTER — AMBULATORY - HEALTHEAST (OUTPATIENT)
Dept: SCHEDULING | Facility: CLINIC | Age: 40
End: 2020-08-19

## 2020-08-19 ENCOUNTER — OFFICE VISIT - HEALTHEAST (OUTPATIENT)
Dept: GERIATRICS | Facility: CLINIC | Age: 40
End: 2020-08-19

## 2020-08-19 ENCOUNTER — HOSPITAL ENCOUNTER (OUTPATIENT)
Dept: RADIOLOGY | Facility: CLINIC | Age: 40
Discharge: HOME | End: 2020-08-19
Attending: FAMILY MEDICINE | Admitting: RADIOLOGY

## 2020-08-19 DIAGNOSIS — Z93.1 FEEDING BY G-TUBE (H): ICD-10-CM

## 2020-08-19 DIAGNOSIS — Z93.4 GASTROJEJUNOSTOMY TUBE STATUS (H): ICD-10-CM

## 2020-08-19 DIAGNOSIS — G93.1 HYPOXIC BRAIN DAMAGE (H): ICD-10-CM

## 2020-08-19 LAB
ANION GAP SERPL CALCULATED.3IONS-SCNC: 12 MMOL/L (ref 5–18)
BUN SERPL-MCNC: 12 MG/DL (ref 8–22)
CALCIUM SERPL-MCNC: 9.5 MG/DL (ref 8.5–10.5)
CHLORIDE BLD-SCNC: 104 MMOL/L (ref 98–107)
CO2 SERPL-SCNC: 25 MMOL/L (ref 22–31)
CREAT SERPL-MCNC: 0.81 MG/DL (ref 0.7–1.3)
GFR SERPL CREATININE-BSD FRML MDRD: >60 ML/MIN/1.73M2
GLUCOSE BLD-MCNC: 90 MG/DL (ref 70–125)
POTASSIUM BLD-SCNC: 4 MMOL/L (ref 3.5–5)
SODIUM SERPL-SCNC: 141 MMOL/L (ref 136–145)

## 2020-08-21 ENCOUNTER — OFFICE VISIT - HEALTHEAST (OUTPATIENT)
Dept: GERIATRICS | Facility: CLINIC | Age: 40
End: 2020-08-21

## 2020-08-21 DIAGNOSIS — L01.00 IMPETIGO ANY SITE: ICD-10-CM

## 2020-08-21 DIAGNOSIS — G93.1 HYPOXIC BRAIN INJURY (H): ICD-10-CM

## 2020-08-24 ENCOUNTER — OFFICE VISIT - HEALTHEAST (OUTPATIENT)
Dept: GERIATRICS | Facility: CLINIC | Age: 40
End: 2020-08-24

## 2020-08-24 DIAGNOSIS — Z93.4 GASTROJEJUNOSTOMY TUBE STATUS (H): ICD-10-CM

## 2020-08-24 DIAGNOSIS — L01.00 IMPETIGO ANY SITE: ICD-10-CM

## 2020-08-25 ENCOUNTER — OFFICE VISIT - HEALTHEAST (OUTPATIENT)
Dept: GERIATRICS | Facility: CLINIC | Age: 40
End: 2020-08-25

## 2020-08-25 DIAGNOSIS — R13.10 DYSPHAGIA, UNSPECIFIED TYPE: ICD-10-CM

## 2020-08-25 DIAGNOSIS — G93.1 HYPOXIC BRAIN INJURY (H): ICD-10-CM

## 2020-08-25 DIAGNOSIS — I46.9 CARDIAC ARREST (H): ICD-10-CM

## 2020-08-25 DIAGNOSIS — E86.1 HYPOTENSION DUE TO HYPOVOLEMIA: ICD-10-CM

## 2020-08-25 DIAGNOSIS — Z93.4 GASTROJEJUNOSTOMY TUBE STATUS (H): ICD-10-CM

## 2020-08-27 ENCOUNTER — OFFICE VISIT - HEALTHEAST (OUTPATIENT)
Dept: GERIATRICS | Facility: CLINIC | Age: 40
End: 2020-08-27

## 2020-08-27 DIAGNOSIS — G93.1 HYPOXIC BRAIN INJURY (H): ICD-10-CM

## 2020-08-27 DIAGNOSIS — R13.10 DYSPHAGIA, UNSPECIFIED TYPE: ICD-10-CM

## 2020-08-27 DIAGNOSIS — Z93.4 GASTROJEJUNOSTOMY TUBE STATUS (H): ICD-10-CM

## 2020-08-27 DIAGNOSIS — G93.1 HYPOXIC BRAIN DAMAGE (H): ICD-10-CM

## 2020-08-27 DIAGNOSIS — E86.1 HYPOTENSION DUE TO HYPOVOLEMIA: ICD-10-CM

## 2020-08-31 ENCOUNTER — OFFICE VISIT - HEALTHEAST (OUTPATIENT)
Dept: GERIATRICS | Facility: CLINIC | Age: 40
End: 2020-08-31

## 2020-08-31 DIAGNOSIS — G93.1 HYPOXIC BRAIN INJURY (H): ICD-10-CM

## 2020-08-31 DIAGNOSIS — L01.00 IMPETIGO ANY SITE: ICD-10-CM

## 2020-09-02 ENCOUNTER — OFFICE VISIT - HEALTHEAST (OUTPATIENT)
Dept: GERIATRICS | Facility: CLINIC | Age: 40
End: 2020-09-02

## 2020-09-02 DIAGNOSIS — G93.1 HYPOXIC BRAIN DAMAGE (H): ICD-10-CM

## 2020-09-02 DIAGNOSIS — Z93.1 G TUBE FEEDINGS (H): ICD-10-CM

## 2020-09-02 DIAGNOSIS — B37.9 YEAST INFECTION: ICD-10-CM

## 2020-09-08 ENCOUNTER — OFFICE VISIT - HEALTHEAST (OUTPATIENT)
Dept: GERIATRICS | Facility: CLINIC | Age: 40
End: 2020-09-08

## 2020-09-08 DIAGNOSIS — E86.1 HYPOTENSION DUE TO HYPOVOLEMIA: ICD-10-CM

## 2020-09-08 DIAGNOSIS — Z93.1 G TUBE FEEDINGS (H): ICD-10-CM

## 2020-09-08 DIAGNOSIS — R13.10 DYSPHAGIA, UNSPECIFIED TYPE: ICD-10-CM

## 2020-09-08 DIAGNOSIS — Z93.4 GASTROJEJUNOSTOMY TUBE STATUS (H): ICD-10-CM

## 2020-09-08 DIAGNOSIS — G93.1 HYPOXIC BRAIN DAMAGE (H): ICD-10-CM

## 2020-09-11 ENCOUNTER — OFFICE VISIT - HEALTHEAST (OUTPATIENT)
Dept: GERIATRICS | Facility: CLINIC | Age: 40
End: 2020-09-11

## 2020-09-11 DIAGNOSIS — Z93.1 G TUBE FEEDINGS (H): ICD-10-CM

## 2020-09-11 DIAGNOSIS — G93.1 HYPOXIC BRAIN DAMAGE (H): ICD-10-CM

## 2020-09-11 DIAGNOSIS — R13.10 DYSPHAGIA, UNSPECIFIED TYPE: ICD-10-CM

## 2020-09-14 ENCOUNTER — OFFICE VISIT - HEALTHEAST (OUTPATIENT)
Dept: GERIATRICS | Facility: CLINIC | Age: 40
End: 2020-09-14

## 2020-09-14 DIAGNOSIS — R13.10 DYSPHAGIA, UNSPECIFIED TYPE: ICD-10-CM

## 2020-09-14 DIAGNOSIS — G93.1 HYPOXIC BRAIN DAMAGE (H): ICD-10-CM

## 2020-09-14 DIAGNOSIS — Z93.1 G TUBE FEEDINGS (H): ICD-10-CM

## 2020-09-16 ENCOUNTER — RECORDS - HEALTHEAST (OUTPATIENT)
Dept: LAB | Facility: CLINIC | Age: 40
End: 2020-09-16

## 2020-09-16 ENCOUNTER — COMMUNICATION - HEALTHEAST (OUTPATIENT)
Dept: GERIATRICS | Facility: CLINIC | Age: 40
End: 2020-09-16

## 2020-09-16 ENCOUNTER — OFFICE VISIT - HEALTHEAST (OUTPATIENT)
Dept: GERIATRICS | Facility: CLINIC | Age: 40
End: 2020-09-16

## 2020-09-16 DIAGNOSIS — I95.2 HYPOTENSION DUE TO DRUGS: ICD-10-CM

## 2020-09-16 DIAGNOSIS — F41.9 ANXIETY: ICD-10-CM

## 2020-09-16 DIAGNOSIS — G93.1 HYPOXIC BRAIN DAMAGE (H): ICD-10-CM

## 2020-09-17 ENCOUNTER — COMMUNICATION - HEALTHEAST (OUTPATIENT)
Dept: GERIATRICS | Facility: CLINIC | Age: 40
End: 2020-09-17

## 2020-09-17 LAB
ANION GAP SERPL CALCULATED.3IONS-SCNC: 8 MMOL/L (ref 5–18)
BUN SERPL-MCNC: 11 MG/DL (ref 8–22)
CALCIUM SERPL-MCNC: 9.8 MG/DL (ref 8.5–10.5)
CHLORIDE BLD-SCNC: 105 MMOL/L (ref 98–107)
CO2 SERPL-SCNC: 28 MMOL/L (ref 22–31)
CREAT SERPL-MCNC: 0.78 MG/DL (ref 0.7–1.3)
GFR SERPL CREATININE-BSD FRML MDRD: >60 ML/MIN/1.73M2
GLUCOSE BLD-MCNC: 91 MG/DL (ref 70–125)
POTASSIUM BLD-SCNC: 4.5 MMOL/L (ref 3.5–5)
SODIUM SERPL-SCNC: 141 MMOL/L (ref 136–145)

## 2020-09-21 ENCOUNTER — OFFICE VISIT - HEALTHEAST (OUTPATIENT)
Dept: GERIATRICS | Facility: CLINIC | Age: 40
End: 2020-09-21

## 2020-09-21 DIAGNOSIS — G93.1 HYPOXIC BRAIN DAMAGE (H): ICD-10-CM

## 2020-09-21 DIAGNOSIS — R45.1 AGITATION: ICD-10-CM

## 2020-09-23 ENCOUNTER — OFFICE VISIT - HEALTHEAST (OUTPATIENT)
Dept: GERIATRICS | Facility: CLINIC | Age: 40
End: 2020-09-23

## 2020-09-23 DIAGNOSIS — R45.1 AGITATION: ICD-10-CM

## 2020-09-23 DIAGNOSIS — G93.1 HYPOXIC BRAIN DAMAGE (H): ICD-10-CM

## 2020-09-25 ENCOUNTER — OFFICE VISIT - HEALTHEAST (OUTPATIENT)
Dept: GERIATRICS | Facility: CLINIC | Age: 40
End: 2020-09-25

## 2020-09-25 DIAGNOSIS — R13.10 DYSPHAGIA, UNSPECIFIED TYPE: ICD-10-CM

## 2020-09-25 DIAGNOSIS — G93.1 HYPOXIC BRAIN DAMAGE (H): ICD-10-CM

## 2020-09-29 ENCOUNTER — OFFICE VISIT - HEALTHEAST (OUTPATIENT)
Dept: GERIATRICS | Facility: CLINIC | Age: 40
End: 2020-09-29

## 2020-09-29 DIAGNOSIS — I95.2 HYPOTENSION DUE TO DRUGS: ICD-10-CM

## 2020-09-29 DIAGNOSIS — Z93.1 G TUBE FEEDINGS (H): ICD-10-CM

## 2020-09-29 DIAGNOSIS — R45.1 AGITATION: ICD-10-CM

## 2020-09-29 DIAGNOSIS — G93.1 HYPOXIC BRAIN DAMAGE (H): ICD-10-CM

## 2020-09-29 DIAGNOSIS — R13.10 DYSPHAGIA, UNSPECIFIED TYPE: ICD-10-CM

## 2020-10-01 ENCOUNTER — OFFICE VISIT - HEALTHEAST (OUTPATIENT)
Dept: GERIATRICS | Facility: CLINIC | Age: 40
End: 2020-10-01

## 2020-10-01 DIAGNOSIS — R45.1 AGITATION: ICD-10-CM

## 2020-10-01 DIAGNOSIS — R13.10 DYSPHAGIA, UNSPECIFIED TYPE: ICD-10-CM

## 2020-10-01 DIAGNOSIS — G93.1 HYPOXIC BRAIN DAMAGE (H): ICD-10-CM

## 2020-10-01 DIAGNOSIS — I95.2 HYPOTENSION DUE TO DRUGS: ICD-10-CM

## 2020-10-02 ENCOUNTER — OFFICE VISIT - HEALTHEAST (OUTPATIENT)
Dept: GERIATRICS | Facility: CLINIC | Age: 40
End: 2020-10-02

## 2020-10-02 DIAGNOSIS — Z93.1 G TUBE FEEDINGS (H): ICD-10-CM

## 2020-10-02 DIAGNOSIS — R13.10 DYSPHAGIA, UNSPECIFIED TYPE: ICD-10-CM

## 2020-10-04 ENCOUNTER — RECORDS - HEALTHEAST (OUTPATIENT)
Dept: LAB | Facility: CLINIC | Age: 40
End: 2020-10-04

## 2020-10-05 ENCOUNTER — OFFICE VISIT - HEALTHEAST (OUTPATIENT)
Dept: GERIATRICS | Facility: CLINIC | Age: 40
End: 2020-10-05

## 2020-10-05 ENCOUNTER — COMMUNICATION - HEALTHEAST (OUTPATIENT)
Dept: GERIATRICS | Facility: CLINIC | Age: 40
End: 2020-10-05

## 2020-10-05 DIAGNOSIS — R13.10 DYSPHAGIA, UNSPECIFIED TYPE: ICD-10-CM

## 2020-10-05 DIAGNOSIS — G93.1 HYPOXIC BRAIN DAMAGE (H): ICD-10-CM

## 2020-10-05 LAB
ANION GAP SERPL CALCULATED.3IONS-SCNC: 11 MMOL/L (ref 5–18)
BUN SERPL-MCNC: 13 MG/DL (ref 8–22)
CALCIUM SERPL-MCNC: 9.7 MG/DL (ref 8.5–10.5)
CHLORIDE BLD-SCNC: 104 MMOL/L (ref 98–107)
CO2 SERPL-SCNC: 26 MMOL/L (ref 22–31)
CREAT SERPL-MCNC: 0.83 MG/DL (ref 0.7–1.3)
GFR SERPL CREATININE-BSD FRML MDRD: >60 ML/MIN/1.73M2
GLUCOSE BLD-MCNC: 106 MG/DL (ref 70–125)
POTASSIUM BLD-SCNC: 4.2 MMOL/L (ref 3.5–5)
SODIUM SERPL-SCNC: 141 MMOL/L (ref 136–145)

## 2020-10-08 ENCOUNTER — OFFICE VISIT - HEALTHEAST (OUTPATIENT)
Dept: GERIATRICS | Facility: CLINIC | Age: 40
End: 2020-10-08

## 2020-10-08 DIAGNOSIS — G93.1 HYPOXIC BRAIN INJURY (H): ICD-10-CM

## 2020-10-08 DIAGNOSIS — R13.10 DYSPHAGIA, UNSPECIFIED TYPE: ICD-10-CM

## 2020-10-14 ENCOUNTER — OFFICE VISIT - HEALTHEAST (OUTPATIENT)
Dept: GERIATRICS | Facility: CLINIC | Age: 40
End: 2020-10-14

## 2020-10-14 DIAGNOSIS — R45.1 AGITATION: ICD-10-CM

## 2020-10-14 DIAGNOSIS — R13.10 DYSPHAGIA, UNSPECIFIED TYPE: ICD-10-CM

## 2020-10-16 ENCOUNTER — AMBULATORY - HEALTHEAST (OUTPATIENT)
Dept: INTERNAL MEDICINE | Facility: CLINIC | Age: 40
End: 2020-10-16

## 2020-10-16 ENCOUNTER — COMMUNICATION - HEALTHEAST (OUTPATIENT)
Dept: INTERNAL MEDICINE | Facility: CLINIC | Age: 40
End: 2020-10-16

## 2020-10-16 DIAGNOSIS — F41.9 ANXIETY: ICD-10-CM

## 2020-10-21 ENCOUNTER — OFFICE VISIT - HEALTHEAST (OUTPATIENT)
Dept: GERIATRICS | Facility: CLINIC | Age: 40
End: 2020-10-21

## 2020-10-21 DIAGNOSIS — G93.1 HYPOXIC BRAIN INJURY (H): ICD-10-CM

## 2020-10-21 DIAGNOSIS — Z93.1 G TUBE FEEDINGS (H): ICD-10-CM

## 2020-10-25 ENCOUNTER — RECORDS - HEALTHEAST (OUTPATIENT)
Dept: LAB | Facility: CLINIC | Age: 40
End: 2020-10-25

## 2020-10-26 ENCOUNTER — OFFICE VISIT - HEALTHEAST (OUTPATIENT)
Dept: GERIATRICS | Facility: CLINIC | Age: 40
End: 2020-10-26

## 2020-10-26 DIAGNOSIS — G93.1 HYPOXIC BRAIN DAMAGE (H): ICD-10-CM

## 2020-10-26 DIAGNOSIS — Z93.1 G TUBE FEEDINGS (H): ICD-10-CM

## 2020-10-26 LAB
ANION GAP SERPL CALCULATED.3IONS-SCNC: 8 MMOL/L (ref 5–18)
BUN SERPL-MCNC: 15 MG/DL (ref 8–22)
CALCIUM SERPL-MCNC: 9.9 MG/DL (ref 8.5–10.5)
CHLORIDE BLD-SCNC: 107 MMOL/L (ref 98–107)
CO2 SERPL-SCNC: 27 MMOL/L (ref 22–31)
CREAT SERPL-MCNC: 0.82 MG/DL (ref 0.7–1.3)
GFR SERPL CREATININE-BSD FRML MDRD: >60 ML/MIN/1.73M2
GLUCOSE BLD-MCNC: 104 MG/DL (ref 70–125)
POTASSIUM BLD-SCNC: 4.8 MMOL/L (ref 3.5–5)
SODIUM SERPL-SCNC: 142 MMOL/L (ref 136–145)

## 2020-10-29 ENCOUNTER — OFFICE VISIT - HEALTHEAST (OUTPATIENT)
Dept: GERIATRICS | Facility: CLINIC | Age: 40
End: 2020-10-29

## 2020-10-29 DIAGNOSIS — R13.10 DYSPHAGIA, UNSPECIFIED TYPE: ICD-10-CM

## 2020-10-29 DIAGNOSIS — Z93.1 G TUBE FEEDINGS (H): ICD-10-CM

## 2020-10-29 DIAGNOSIS — G93.1 HYPOXIC BRAIN DAMAGE (H): ICD-10-CM

## 2020-10-29 DIAGNOSIS — I95.2 HYPOTENSION DUE TO DRUGS: ICD-10-CM

## 2020-11-02 ENCOUNTER — OFFICE VISIT - HEALTHEAST (OUTPATIENT)
Dept: GERIATRICS | Facility: CLINIC | Age: 40
End: 2020-11-02

## 2020-11-02 DIAGNOSIS — R45.1 AGITATION: ICD-10-CM

## 2020-11-02 DIAGNOSIS — G93.1 HYPOXIC BRAIN INJURY (H): ICD-10-CM

## 2020-11-04 ENCOUNTER — OFFICE VISIT - HEALTHEAST (OUTPATIENT)
Dept: GERIATRICS | Facility: CLINIC | Age: 40
End: 2020-11-04

## 2020-11-04 DIAGNOSIS — Z93.1 G TUBE FEEDINGS (H): ICD-10-CM

## 2020-11-04 DIAGNOSIS — G93.1 HYPOXIC BRAIN DAMAGE (H): ICD-10-CM

## 2020-11-09 ENCOUNTER — OFFICE VISIT - HEALTHEAST (OUTPATIENT)
Dept: GERIATRICS | Facility: CLINIC | Age: 40
End: 2020-11-09

## 2020-11-09 DIAGNOSIS — G93.1 HYPOXIC BRAIN INJURY (H): ICD-10-CM

## 2020-11-09 DIAGNOSIS — R46.89 AGGRESSION: ICD-10-CM

## 2020-11-10 ENCOUNTER — OFFICE VISIT - HEALTHEAST (OUTPATIENT)
Dept: GERIATRICS | Facility: CLINIC | Age: 40
End: 2020-11-10

## 2020-11-10 DIAGNOSIS — R45.1 AGITATION: ICD-10-CM

## 2020-11-10 DIAGNOSIS — I95.2 HYPOTENSION DUE TO DRUGS: ICD-10-CM

## 2020-11-10 DIAGNOSIS — G93.1 HYPOXIC BRAIN INJURY (H): ICD-10-CM

## 2020-11-10 DIAGNOSIS — R13.10 DYSPHAGIA, UNSPECIFIED TYPE: ICD-10-CM

## 2020-11-10 DIAGNOSIS — I46.9 CARDIAC ARREST (H): ICD-10-CM

## 2020-11-10 DIAGNOSIS — R46.89 AGGRESSION: ICD-10-CM

## 2020-11-30 ENCOUNTER — OFFICE VISIT - HEALTHEAST (OUTPATIENT)
Dept: GERIATRICS | Facility: CLINIC | Age: 40
End: 2020-11-30

## 2020-11-30 ENCOUNTER — RECORDS - HEALTHEAST (OUTPATIENT)
Dept: LAB | Facility: CLINIC | Age: 40
End: 2020-11-30

## 2020-11-30 DIAGNOSIS — Z93.4 GASTROJEJUNOSTOMY TUBE STATUS (H): ICD-10-CM

## 2020-11-30 DIAGNOSIS — J96.01 ACUTE RESPIRATORY FAILURE WITH HYPOXIA (H): ICD-10-CM

## 2020-11-30 DIAGNOSIS — G93.1 HYPOXIC BRAIN INJURY (H): ICD-10-CM

## 2020-12-01 ENCOUNTER — OFFICE VISIT - HEALTHEAST (OUTPATIENT)
Dept: GERIATRICS | Facility: CLINIC | Age: 40
End: 2020-12-01

## 2020-12-01 ENCOUNTER — AMBULATORY - HEALTHEAST (OUTPATIENT)
Dept: GERIATRICS | Facility: CLINIC | Age: 40
End: 2020-12-01

## 2020-12-01 ENCOUNTER — COMMUNICATION - HEALTHEAST (OUTPATIENT)
Dept: GERIATRICS | Facility: CLINIC | Age: 40
End: 2020-12-01

## 2020-12-01 DIAGNOSIS — G93.1 HYPOXIC BRAIN INJURY (H): ICD-10-CM

## 2020-12-01 DIAGNOSIS — R13.10 DYSPHAGIA, UNSPECIFIED TYPE: ICD-10-CM

## 2020-12-01 DIAGNOSIS — J96.01 ACUTE RESPIRATORY FAILURE WITH HYPOXIA (H): ICD-10-CM

## 2020-12-01 DIAGNOSIS — I95.2 HYPOTENSION DUE TO DRUGS: ICD-10-CM

## 2020-12-01 DIAGNOSIS — R46.89 AGGRESSION: ICD-10-CM

## 2020-12-01 DIAGNOSIS — Z93.4 GASTROJEJUNOSTOMY TUBE STATUS (H): ICD-10-CM

## 2020-12-01 LAB
ALBUMIN SERPL-MCNC: 4.4 G/DL (ref 3.5–5)
ALP SERPL-CCNC: 104 U/L (ref 45–120)
ALT SERPL W P-5'-P-CCNC: 25 U/L (ref 0–45)
ANION GAP SERPL CALCULATED.3IONS-SCNC: 24 MMOL/L (ref 5–18)
AST SERPL W P-5'-P-CCNC: 30 U/L (ref 0–40)
BASOPHILS # BLD AUTO: 0.1 THOU/UL (ref 0–0.2)
BASOPHILS NFR BLD AUTO: 0 % (ref 0–2)
BILIRUB DIRECT SERPL-MCNC: 0.2 MG/DL
BILIRUB SERPL-MCNC: 0.8 MG/DL (ref 0–1)
BUN SERPL-MCNC: 38 MG/DL (ref 8–22)
CALCIUM SERPL-MCNC: 10.8 MG/DL (ref 8.5–10.5)
CHLORIDE BLD-SCNC: 111 MMOL/L (ref 98–107)
CO2 SERPL-SCNC: 14 MMOL/L (ref 22–31)
CREAT SERPL-MCNC: 1.11 MG/DL (ref 0.7–1.3)
EOSINOPHIL # BLD AUTO: 0 THOU/UL (ref 0–0.4)
EOSINOPHIL NFR BLD AUTO: 0 % (ref 0–6)
ERYTHROCYTE [DISTWIDTH] IN BLOOD BY AUTOMATED COUNT: 15.5 % (ref 11–14.5)
GFR SERPL CREATININE-BSD FRML MDRD: >60 ML/MIN/1.73M2
GLUCOSE BLD-MCNC: 101 MG/DL (ref 70–125)
HCT VFR BLD AUTO: 52.2 % (ref 40–54)
HGB BLD-MCNC: 15.9 G/DL (ref 14–18)
IMM GRANULOCYTES # BLD: 0.2 THOU/UL
IMM GRANULOCYTES NFR BLD: 1 %
LYMPHOCYTES # BLD AUTO: 1.2 THOU/UL (ref 0.8–4.4)
LYMPHOCYTES NFR BLD AUTO: 7 % (ref 20–40)
MAGNESIUM SERPL-MCNC: 2.3 MG/DL (ref 1.8–2.6)
MCH RBC QN AUTO: 29.1 PG (ref 27–34)
MCHC RBC AUTO-ENTMCNC: 30.5 G/DL (ref 32–36)
MCV RBC AUTO: 95 FL (ref 80–100)
MONOCYTES # BLD AUTO: 1 THOU/UL (ref 0–0.9)
MONOCYTES NFR BLD AUTO: 6 % (ref 2–10)
NEUTROPHILS # BLD AUTO: 14.6 THOU/UL (ref 2–7.7)
NEUTROPHILS NFR BLD AUTO: 86 % (ref 50–70)
PLATELET # BLD AUTO: 346 THOU/UL (ref 140–440)
PMV BLD AUTO: 12 FL (ref 8.5–12.5)
POTASSIUM BLD-SCNC: 5.6 MMOL/L (ref 3.5–5)
PROT SERPL-MCNC: 9.3 G/DL (ref 6–8)
RBC # BLD AUTO: 5.47 MILL/UL (ref 4.4–6.2)
SODIUM SERPL-SCNC: 149 MMOL/L (ref 136–145)
WBC: 17 THOU/UL (ref 4–11)

## 2020-12-02 ENCOUNTER — RECORDS - HEALTHEAST (OUTPATIENT)
Dept: LAB | Facility: CLINIC | Age: 40
End: 2020-12-02

## 2020-12-02 ENCOUNTER — OFFICE VISIT - HEALTHEAST (OUTPATIENT)
Dept: GERIATRICS | Facility: CLINIC | Age: 40
End: 2020-12-02

## 2020-12-02 DIAGNOSIS — G93.1 HYPOXIC BRAIN INJURY (H): ICD-10-CM

## 2020-12-02 DIAGNOSIS — R50.9 FEVER, UNSPECIFIED FEVER CAUSE: ICD-10-CM

## 2020-12-02 LAB
ALBUMIN UR-MCNC: ABNORMAL MG/DL
AMORPH CRY #/AREA URNS HPF: ABNORMAL /[HPF]
APPEARANCE UR: ABNORMAL
BACTERIA #/AREA URNS HPF: ABNORMAL HPF
BILIRUB UR QL STRIP: NEGATIVE
COLOR UR AUTO: ABNORMAL
GLUCOSE UR STRIP-MCNC: NEGATIVE MG/DL
HGB UR QL STRIP: NEGATIVE
KETONES UR STRIP-MCNC: NEGATIVE MG/DL
LEUKOCYTE ESTERASE UR QL STRIP: NEGATIVE
NITRATE UR QL: NEGATIVE
PH UR STRIP: 6 [PH] (ref 4.5–8)
RBC #/AREA URNS AUTO: ABNORMAL HPF
SP GR UR STRIP: 1.03 (ref 1–1.03)
SQUAMOUS #/AREA URNS AUTO: ABNORMAL LPF
UROBILINOGEN UR STRIP-ACNC: ABNORMAL
WBC #/AREA URNS AUTO: ABNORMAL HPF

## 2020-12-03 ENCOUNTER — OFFICE VISIT - HEALTHEAST (OUTPATIENT)
Dept: GERIATRICS | Facility: CLINIC | Age: 40
End: 2020-12-03

## 2020-12-03 DIAGNOSIS — I95.2 HYPOTENSION DUE TO DRUGS: ICD-10-CM

## 2020-12-03 DIAGNOSIS — R13.10 DYSPHAGIA, UNSPECIFIED TYPE: ICD-10-CM

## 2020-12-03 DIAGNOSIS — G93.1 HYPOXIC BRAIN INJURY (H): ICD-10-CM

## 2020-12-03 DIAGNOSIS — Z93.4 GASTROJEJUNOSTOMY TUBE STATUS (H): ICD-10-CM

## 2020-12-03 DIAGNOSIS — R46.89 AGGRESSION: ICD-10-CM

## 2020-12-03 LAB
ANION GAP SERPL CALCULATED.3IONS-SCNC: 11 MMOL/L (ref 5–18)
BACTERIA SPEC CULT: NO GROWTH
BUN SERPL-MCNC: 39 MG/DL (ref 8–22)
CALCIUM SERPL-MCNC: 9.8 MG/DL (ref 8.5–10.5)
CHLORIDE BLD-SCNC: 114 MMOL/L (ref 98–107)
CO2 SERPL-SCNC: 27 MMOL/L (ref 22–31)
CREAT SERPL-MCNC: 0.91 MG/DL (ref 0.7–1.3)
GFR SERPL CREATININE-BSD FRML MDRD: >60 ML/MIN/1.73M2
GLUCOSE BLD-MCNC: 135 MG/DL (ref 70–125)
POTASSIUM BLD-SCNC: 3.9 MMOL/L (ref 3.5–5)
SODIUM SERPL-SCNC: 152 MMOL/L (ref 136–145)

## 2020-12-04 ENCOUNTER — COMMUNICATION - HEALTHEAST (OUTPATIENT)
Dept: GERIATRICS | Facility: CLINIC | Age: 40
End: 2020-12-04

## 2020-12-07 ENCOUNTER — OFFICE VISIT - HEALTHEAST (OUTPATIENT)
Dept: GERIATRICS | Facility: CLINIC | Age: 40
End: 2020-12-07

## 2020-12-07 ENCOUNTER — RECORDS - HEALTHEAST (OUTPATIENT)
Dept: LAB | Facility: CLINIC | Age: 40
End: 2020-12-07

## 2020-12-07 DIAGNOSIS — R50.9 FEVER, UNSPECIFIED FEVER CAUSE: ICD-10-CM

## 2020-12-07 DIAGNOSIS — G93.1 HYPOXIC BRAIN INJURY (H): ICD-10-CM

## 2020-12-08 ENCOUNTER — OFFICE VISIT - HEALTHEAST (OUTPATIENT)
Dept: GERIATRICS | Facility: CLINIC | Age: 40
End: 2020-12-08

## 2020-12-08 ENCOUNTER — COMMUNICATION - HEALTHEAST (OUTPATIENT)
Dept: GERIATRICS | Facility: CLINIC | Age: 40
End: 2020-12-08

## 2020-12-08 DIAGNOSIS — R50.9 FEVER, UNSPECIFIED FEVER CAUSE: ICD-10-CM

## 2020-12-08 DIAGNOSIS — G93.1 HYPOXIC BRAIN INJURY (H): ICD-10-CM

## 2020-12-08 DIAGNOSIS — Z93.4 GASTROJEJUNOSTOMY TUBE STATUS (H): ICD-10-CM

## 2020-12-08 DIAGNOSIS — I95.2 HYPOTENSION DUE TO DRUGS: ICD-10-CM

## 2020-12-08 DIAGNOSIS — R13.10 DYSPHAGIA, UNSPECIFIED TYPE: ICD-10-CM

## 2020-12-08 LAB
ALBUMIN SERPL-MCNC: 3.2 G/DL (ref 3.5–5)
ALP SERPL-CCNC: 79 U/L (ref 45–120)
ALT SERPL W P-5'-P-CCNC: 126 U/L (ref 0–45)
ANION GAP SERPL CALCULATED.3IONS-SCNC: 12 MMOL/L (ref 5–18)
AST SERPL W P-5'-P-CCNC: 54 U/L (ref 0–40)
BILIRUB DIRECT SERPL-MCNC: 0.2 MG/DL
BILIRUB SERPL-MCNC: 0.7 MG/DL (ref 0–1)
BUN SERPL-MCNC: 23 MG/DL (ref 8–22)
CALCIUM SERPL-MCNC: 8.8 MG/DL (ref 8.5–10.5)
CHLORIDE BLD-SCNC: 112 MMOL/L (ref 98–107)
CO2 SERPL-SCNC: 25 MMOL/L (ref 22–31)
CREAT SERPL-MCNC: 0.74 MG/DL (ref 0.7–1.3)
ERYTHROCYTE [DISTWIDTH] IN BLOOD BY AUTOMATED COUNT: 14.7 % (ref 11–14.5)
GFR SERPL CREATININE-BSD FRML MDRD: >60 ML/MIN/1.73M2
GLUCOSE BLD-MCNC: 137 MG/DL (ref 70–125)
HCT VFR BLD AUTO: 43.8 % (ref 40–54)
HGB BLD-MCNC: 13.7 G/DL (ref 14–18)
MAGNESIUM SERPL-MCNC: 2.3 MG/DL (ref 1.8–2.6)
MCH RBC QN AUTO: 29.3 PG (ref 27–34)
MCHC RBC AUTO-ENTMCNC: 31.3 G/DL (ref 32–36)
MCV RBC AUTO: 94 FL (ref 80–100)
PLATELET # BLD AUTO: 171 THOU/UL (ref 140–440)
PMV BLD AUTO: 13.5 FL (ref 8.5–12.5)
POTASSIUM BLD-SCNC: 3.8 MMOL/L (ref 3.5–5)
PROT SERPL-MCNC: 7.1 G/DL (ref 6–8)
RBC # BLD AUTO: 4.68 MILL/UL (ref 4.4–6.2)
SODIUM SERPL-SCNC: 149 MMOL/L (ref 136–145)
WBC: 10.6 THOU/UL (ref 4–11)

## 2020-12-09 ENCOUNTER — OFFICE VISIT - HEALTHEAST (OUTPATIENT)
Dept: GERIATRICS | Facility: CLINIC | Age: 40
End: 2020-12-09

## 2020-12-09 ENCOUNTER — RECORDS - HEALTHEAST (OUTPATIENT)
Dept: LAB | Facility: CLINIC | Age: 40
End: 2020-12-09

## 2020-12-09 DIAGNOSIS — B37.0 THRUSH, ORAL: ICD-10-CM

## 2020-12-09 DIAGNOSIS — G93.1 HYPOXIC BRAIN INJURY (H): ICD-10-CM

## 2020-12-10 ENCOUNTER — COMMUNICATION - HEALTHEAST (OUTPATIENT)
Dept: GERIATRICS | Facility: CLINIC | Age: 40
End: 2020-12-10

## 2020-12-10 LAB
ALBUMIN SERPL-MCNC: 3.2 G/DL (ref 3.5–5)
ALP SERPL-CCNC: 87 U/L (ref 45–120)
ALT SERPL W P-5'-P-CCNC: 104 U/L (ref 0–45)
ANION GAP SERPL CALCULATED.3IONS-SCNC: 12 MMOL/L (ref 5–18)
AST SERPL W P-5'-P-CCNC: 42 U/L (ref 0–40)
BILIRUB SERPL-MCNC: 0.6 MG/DL (ref 0–1)
BUN SERPL-MCNC: 22 MG/DL (ref 8–22)
CALCIUM SERPL-MCNC: 8.6 MG/DL (ref 8.5–10.5)
CHLORIDE BLD-SCNC: 110 MMOL/L (ref 98–107)
CO2 SERPL-SCNC: 22 MMOL/L (ref 22–31)
CREAT SERPL-MCNC: 0.7 MG/DL (ref 0.7–1.3)
GFR SERPL CREATININE-BSD FRML MDRD: >60 ML/MIN/1.73M2
GLUCOSE BLD-MCNC: 94 MG/DL (ref 70–125)
POTASSIUM BLD-SCNC: 4.4 MMOL/L (ref 3.5–5)
PROT SERPL-MCNC: 7.1 G/DL (ref 6–8)
SODIUM SERPL-SCNC: 144 MMOL/L (ref 136–145)

## 2020-12-14 ENCOUNTER — OFFICE VISIT - HEALTHEAST (OUTPATIENT)
Dept: GERIATRICS | Facility: CLINIC | Age: 40
End: 2020-12-14

## 2020-12-14 DIAGNOSIS — G93.1 HYPOXIC BRAIN INJURY (H): ICD-10-CM

## 2020-12-14 DIAGNOSIS — R13.10 DYSPHAGIA, UNSPECIFIED TYPE: ICD-10-CM

## 2020-12-16 ENCOUNTER — RECORDS - HEALTHEAST (OUTPATIENT)
Dept: LAB | Facility: CLINIC | Age: 40
End: 2020-12-16

## 2020-12-16 ENCOUNTER — OFFICE VISIT - HEALTHEAST (OUTPATIENT)
Dept: GERIATRICS | Facility: CLINIC | Age: 40
End: 2020-12-16

## 2020-12-16 DIAGNOSIS — G93.1 HYPOXIC BRAIN INJURY (H): ICD-10-CM

## 2020-12-16 DIAGNOSIS — R41.89 SEDATED: ICD-10-CM

## 2020-12-17 ENCOUNTER — COMMUNICATION - HEALTHEAST (OUTPATIENT)
Dept: GERIATRICS | Facility: CLINIC | Age: 40
End: 2020-12-17

## 2020-12-17 LAB
ALBUMIN SERPL-MCNC: 3.2 G/DL (ref 3.5–5)
ALP SERPL-CCNC: 102 U/L (ref 45–120)
ALT SERPL W P-5'-P-CCNC: 72 U/L (ref 0–45)
ANION GAP SERPL CALCULATED.3IONS-SCNC: 9 MMOL/L (ref 5–18)
AST SERPL W P-5'-P-CCNC: 45 U/L (ref 0–40)
BILIRUB SERPL-MCNC: 0.4 MG/DL (ref 0–1)
BUN SERPL-MCNC: 16 MG/DL (ref 8–22)
CALCIUM SERPL-MCNC: 8.7 MG/DL (ref 8.5–10.5)
CHLORIDE BLD-SCNC: 100 MMOL/L (ref 98–107)
CO2 SERPL-SCNC: 26 MMOL/L (ref 22–31)
CREAT SERPL-MCNC: 0.62 MG/DL (ref 0.7–1.3)
GFR SERPL CREATININE-BSD FRML MDRD: >60 ML/MIN/1.73M2
GLUCOSE BLD-MCNC: 98 MG/DL (ref 70–125)
POTASSIUM BLD-SCNC: 4.2 MMOL/L (ref 3.5–5)
PROT SERPL-MCNC: 6.8 G/DL (ref 6–8)
SODIUM SERPL-SCNC: 135 MMOL/L (ref 136–145)

## 2020-12-22 ENCOUNTER — COMMUNICATION - HEALTHEAST (OUTPATIENT)
Dept: GERIATRICS | Facility: CLINIC | Age: 40
End: 2020-12-22

## 2020-12-22 ENCOUNTER — RECORDS - HEALTHEAST (OUTPATIENT)
Dept: LAB | Facility: CLINIC | Age: 40
End: 2020-12-22

## 2020-12-22 ENCOUNTER — OFFICE VISIT - HEALTHEAST (OUTPATIENT)
Dept: GERIATRICS | Facility: CLINIC | Age: 40
End: 2020-12-22

## 2020-12-22 DIAGNOSIS — G93.1 HYPOXIC BRAIN DAMAGE (H): ICD-10-CM

## 2020-12-22 DIAGNOSIS — I95.2 HYPOTENSION DUE TO DRUGS: ICD-10-CM

## 2020-12-23 ENCOUNTER — COMMUNICATION - HEALTHEAST (OUTPATIENT)
Dept: GERIATRICS | Facility: CLINIC | Age: 40
End: 2020-12-23

## 2020-12-24 LAB
ANION GAP SERPL CALCULATED.3IONS-SCNC: 13 MMOL/L (ref 5–18)
BUN SERPL-MCNC: 17 MG/DL (ref 8–22)
CALCIUM SERPL-MCNC: 9.6 MG/DL (ref 8.5–10.5)
CHLORIDE BLD-SCNC: 102 MMOL/L (ref 98–107)
CO2 SERPL-SCNC: 23 MMOL/L (ref 22–31)
CREAT SERPL-MCNC: 0.75 MG/DL (ref 0.7–1.3)
GFR SERPL CREATININE-BSD FRML MDRD: >60 ML/MIN/1.73M2
GLUCOSE BLD-MCNC: 126 MG/DL (ref 70–125)
POTASSIUM BLD-SCNC: 4 MMOL/L (ref 3.5–5)
SODIUM SERPL-SCNC: 138 MMOL/L (ref 136–145)

## 2020-12-31 ENCOUNTER — OFFICE VISIT - HEALTHEAST (OUTPATIENT)
Dept: GERIATRICS | Facility: CLINIC | Age: 40
End: 2020-12-31

## 2020-12-31 DIAGNOSIS — G93.1 HYPOXIC BRAIN INJURY (H): ICD-10-CM

## 2020-12-31 DIAGNOSIS — J96.01 ACUTE RESPIRATORY FAILURE WITH HYPOXIA (H): ICD-10-CM

## 2021-01-05 ENCOUNTER — OFFICE VISIT - HEALTHEAST (OUTPATIENT)
Dept: GERIATRICS | Facility: CLINIC | Age: 41
End: 2021-01-05

## 2021-01-05 DIAGNOSIS — I95.2 HYPOTENSION DUE TO DRUGS: ICD-10-CM

## 2021-01-05 DIAGNOSIS — Z93.4 GASTROJEJUNOSTOMY TUBE STATUS (H): ICD-10-CM

## 2021-01-05 DIAGNOSIS — G93.1 HYPOXIC BRAIN INJURY (H): ICD-10-CM

## 2021-01-05 DIAGNOSIS — R13.10 DYSPHAGIA, UNSPECIFIED TYPE: ICD-10-CM

## 2021-01-12 ENCOUNTER — OFFICE VISIT - HEALTHEAST (OUTPATIENT)
Dept: GERIATRICS | Facility: CLINIC | Age: 41
End: 2021-01-12

## 2021-01-12 DIAGNOSIS — Z93.4 GASTROJEJUNOSTOMY TUBE STATUS (H): ICD-10-CM

## 2021-01-12 DIAGNOSIS — I95.2 HYPOTENSION DUE TO DRUGS: ICD-10-CM

## 2021-01-12 DIAGNOSIS — G93.1 HYPOXIC BRAIN INJURY (H): ICD-10-CM

## 2021-01-12 DIAGNOSIS — R13.10 DYSPHAGIA, UNSPECIFIED TYPE: ICD-10-CM

## 2021-01-13 ENCOUNTER — OFFICE VISIT - HEALTHEAST (OUTPATIENT)
Dept: GERIATRICS | Facility: CLINIC | Age: 41
End: 2021-01-13

## 2021-01-13 DIAGNOSIS — Z93.4 GASTROJEJUNOSTOMY TUBE STATUS (H): ICD-10-CM

## 2021-01-13 DIAGNOSIS — G93.1 HYPOXIC BRAIN DAMAGE (H): ICD-10-CM

## 2021-01-19 ENCOUNTER — OFFICE VISIT (OUTPATIENT)
Dept: INTERNAL MEDICINE | Facility: CLINIC | Age: 41
End: 2021-01-19
Payer: COMMERCIAL

## 2021-01-19 ENCOUNTER — OFFICE VISIT - HEALTHEAST (OUTPATIENT)
Dept: GERIATRICS | Facility: CLINIC | Age: 41
End: 2021-01-19

## 2021-01-19 VITALS
RESPIRATION RATE: 16 BRPM | BODY MASS INDEX: 19.44 KG/M2 | OXYGEN SATURATION: 95 % | HEIGHT: 66 IN | TEMPERATURE: 97.6 F | DIASTOLIC BLOOD PRESSURE: 58 MMHG | HEART RATE: 108 BPM | SYSTOLIC BLOOD PRESSURE: 96 MMHG | WEIGHT: 121 LBS

## 2021-01-19 DIAGNOSIS — I95.2 HYPOTENSION DUE TO DRUGS: ICD-10-CM

## 2021-01-19 DIAGNOSIS — R46.89 AGGRESSION: ICD-10-CM

## 2021-01-19 DIAGNOSIS — Z46.59 ENCOUNTER FOR CARE RELATED TO FEEDING TUBE: ICD-10-CM

## 2021-01-19 DIAGNOSIS — G93.1 HYPOXIC BRAIN INJURY (H): ICD-10-CM

## 2021-01-19 DIAGNOSIS — R13.10 DYSPHAGIA, UNSPECIFIED TYPE: ICD-10-CM

## 2021-01-19 DIAGNOSIS — I46.9 CARDIAC ARREST (H): Primary | ICD-10-CM

## 2021-01-19 DIAGNOSIS — G93.1 ANOXIC BRAIN DAMAGE (H): ICD-10-CM

## 2021-01-19 PROCEDURE — 99204 OFFICE O/P NEW MOD 45 MIN: CPT | Performed by: INTERNAL MEDICINE

## 2021-01-19 RX ORDER — NYSTATIN 100000 U/G
CREAM TOPICAL
COMMUNITY
End: 2021-01-19

## 2021-01-19 RX ORDER — GINSENG 100 MG
CAPSULE ORAL
COMMUNITY
Start: 2020-12-23 | End: 2021-03-24

## 2021-01-19 RX ORDER — VALPROIC ACID 250 MG/5ML
250 SOLUTION ORAL 2 TIMES DAILY
COMMUNITY
Start: 2020-12-23 | End: 2021-04-19

## 2021-01-19 RX ORDER — MINERAL OIL/HYDROPHIL PETROLAT
OINTMENT (GRAM) TOPICAL 2 TIMES DAILY
COMMUNITY
Start: 2020-07-31 | End: 2021-05-26

## 2021-01-19 RX ORDER — HALOPERIDOL 5 MG/1
5 TABLET ORAL 3 TIMES DAILY
COMMUNITY
Start: 2020-11-27 | End: 2021-02-11

## 2021-01-19 RX ORDER — TRAZODONE HYDROCHLORIDE 50 MG/1
50 TABLET, FILM COATED ORAL AT BEDTIME
COMMUNITY
Start: 2020-07-31 | End: 2021-04-07

## 2021-01-19 RX ORDER — PAROXETINE 20 MG/1
20 TABLET, FILM COATED ORAL EVERY MORNING
COMMUNITY
Start: 2020-11-27 | End: 2021-04-07

## 2021-01-19 RX ORDER — QUETIAPINE FUMARATE 25 MG/1
50 TABLET, FILM COATED ORAL 3 TIMES DAILY
COMMUNITY
Start: 2020-07-31 | End: 2021-04-01 | Stop reason: DRUGHIGH

## 2021-01-19 RX ORDER — ATORVASTATIN CALCIUM 40 MG/1
40 TABLET, FILM COATED ORAL AT BEDTIME
COMMUNITY
Start: 2020-08-01 | End: 2021-04-06

## 2021-01-19 ASSESSMENT — MIFFLIN-ST. JEOR: SCORE: 1396.6

## 2021-01-19 NOTE — PROGRESS NOTES
"  Assessment & Plan     Cardiac arrest (H)  Suggest the patient may benefit from establishing care with cardiology thus referral has been placed.  - CARDIOLOGY EVAL ADULT REFERRAL; Future    Anoxic brain damage (H)  Primary issues of concern centered around anoxic brain injury with cognitive impairment.  Family has requested a mental health assessment to determine whether or not his medications are appropriate.  Upon review seems well within acceptable range from my perspective less issues of mild drowsiness which may be secondary to his haloperidol.  - MENTAL HEALTH REFERRAL  - Adult; Psychiatry; Psychiatry; G: Collaborative Care Psychiatry Service/Bridge to Long-Term Psychiatry as indicated ([phone]); Yes; Chronic Mental Health without improvement; Yes; We will contact you to schedule ...    - CARE COORDINATION REFERRAL has been recommended for potential need for home care assessment PT, OT.    Encounter for care related to feeding tube  Currently has a feeding tube in place.  Unclear on swallowing status  Likely will be feeding tube dependent for quite some time.  Suggest speech evaluation to determine benefit of potential oral replacement.  And reading off of feeding tube  - SPEECH THERAPY REFERRAL; Future    45 minutes spent on the date of the encounter doing chart review, review of outside records, review of test results, patient visit, documentation and discussion with family      BMI:   Estimated body mass index is 30.6 kg/m  as calculated from the following:    Height as of 5/17/20: 1.676 m (5' 6\").    Weight as of 6/24/20: 86 kg (189 lb 9.5 oz).     Unclear if patient has received influenza vaccine also.  It appears he has received Covid vaccination as of 12/28.    See Patient Instructions    Return in about 1 month (around 2/19/2021) for follow-up with regular primary care provider, Care coordination.    Cachorro Morales MD  [facility]    Carlos Ellison is a " "40 year old who presents to clinic today for the following health issues  accompanied by his father and step mother:    HPI   New Patient/Transfer of Care- patient will be moving to [facility] and looking to establish with new provider. Need updated H&P for admissions to group home    Concerns:  1. Discuss referral to psych, PT, OT, speech therapy and cardiac rehab   2. Discuss feeding tube    He had a long hospital stay at [facility] (6/25-7/31/20) for PEA/VF arrest and anterior STEMI, angiography revealed a thrombotic occlusion of the LAD successfully treated with aspiration thrombectomy and PCI w/NAZIA of proximal LAD. Hypoxic Brain Injury secondary to VF arrest with persistent moderate-severe encephalopathy, TBI, nonverbal, respiratory failure requiring trach placement, dysphagia (on tube feeding) who was admitted to [facility] on 11/11/2020 from a TCU for evaluation of increased agitation and aggressive behaviors.      Work up on admission negative for infectious source. Neurology and psychiatry were consulted.      Neurology suspected his symptoms were possibly concerning for toxic metabolic (keppra, mild hyperammonemia) etiology. they discontinued Keppra and started Depakote (will need liver function test monitoring). Stopped bromocryptine. MRI head neg for acute findings. Psychiatry following and assisting with management.  Palliative care consulted for assistance with goals of care discussion.      Family care conference held on 11/20 with palliative care and neurology. MRI brain findings and prognosis were discussed with family members by neurology. 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 Mental status remained unchanged. He was discharged back to LTC in stable condition on 11/27/20. " Palliative care follow up as an outpatient recommended.      He is now here and accompanied by his parents because he is transitioning from one group home facility to another.    The parents have concerns in regards to his medications as well as numerous issues in regards to PT, OT, feeding tube issues as well as his mental health medications.      Review of Systems     Patient is nonverbal and review of systems is obtained through parents  CONSTITUTIONAL: NEGATIVE for fever, chills, change in weight  EYES: NEGATIVE for vision changes or irritation  ENT/MOUTH: NEGATIVE for ear, mouth and throat problems  RESP: NEGATIVE for significant cough or SOB  CV: NEGATIVE for chest pain, palpitations  GI: NEGATIVE for nausea, abdominal pain, heartburn  : NEGATIVE for frequency, dysuria, or hematuria  HEME: NEGATIVE for bleeding problems      Objective    BP 96/58   Pulse 108   Temp 97.6  F (36.4  C) (Temporal)   Resp 16   SpO2 95%   There is no height or weight on file to calculate BMI.  Physical Exam   GENERAL:  alert and no distress, nonverbal  Does not follow simple commands.  FT site LLQ  RESP: lungs clear to auscultation - no rales, rhonchi or wheezes  CV: regular rate and rhythm, normal S1 S2, no S3 or S4, no click or rub, no peripheral edema and peripheral pulses strong  MS: no gross musculoskeletal defects noted  NEURO: non-verbal in wheelchair

## 2021-01-20 ENCOUNTER — PATIENT OUTREACH (OUTPATIENT)
Dept: NURSING | Facility: CLINIC | Age: 41
End: 2021-01-20
Payer: COMMERCIAL

## 2021-01-20 ENCOUNTER — PATIENT OUTREACH (OUTPATIENT)
Dept: CARE COORDINATION | Facility: CLINIC | Age: 41
End: 2021-01-20

## 2021-01-20 NOTE — LETTER
[facility]  Complex Care Plan  About Me:    Patient Name:  Scot Bishop    YOB: 1980  Age:         40 year old   Heather MRN:    [id number] Telephone Information:  Home Phone [phone]   Mobile Not on file.       Address:  [address] [address] [place] [id number] Email address:  No e-mail address on record      Emergency Contact(s)    Name Relationship Lgl Grd Work Phone Home Phone Mobile Phone   1. NIGEL BISHOP Father No   [phone]   2. MITZY, CAR* Step parent No   [phone]           Primary language:  English     needed? No   [facility]:  [phone] op. 1  Other communication barriers:    Preferred Method of Communication:     Current living arrangement: (group home)  Mobility Status/ Medical Equipment:      Health Maintenance  Health Maintenance Reviewed: Due/Overdue   Health Maintenance Due   Topic Date Due     PREVENTIVE CARE VISIT  1980     HIV SCREENING  07/08/1994     HEPATITIS C SCREENING  07/08/1997     INFLUENZA VACCINE (1) 09/01/2020     PHQ-2  01/01/2021     COVID-19 Vaccine (2 of 2 - Moderna series) 01/25/2021     My Access Plan  Medical Emergency 911   Primary Clinic Line [facility] - [phone]   24 Hour Appointment Line [phone] or  [phone]) (toll-free)   24 Hour Nurse Line [phone] (toll-free)   Preferred Urgent Care     Preferred Hospital     Preferred Pharmacy No Pharmacies Listed   Behavioral Health Crisis Line The National Suicide Prevention Lifeline at 1-143.803.1834 or 911       My Care Team Members  Patient Care Team       Relationship Specialty Notifications Start End    Cachorro Morales MD PCP - General Internal Medicine  1/19/21     Phone: [phone] Fax: [phone] [address] [place] [id number]    Scot Matamoros MD MD Cardiology  6/12/20     Phone: [phone] Fax: [phone] [address] [facility]  [facility] [address]    Frandy Doe, RN Lead Care Coordinator Primary Care - CC  1/20/21     Phone: [phone]                 My Care Plans  Self Management and Treatment Plan  Goals and (Comments)  Goals        General    1. Psychosocial     Notes - Note created  1/20/2021  4:36 PM by Frandy Doe, RN    Goal Statement: Patient will have adequate supports and resources in place to ensure a smooth transition from TCU to group home.  Date Goal set: 1/20/21  Barriers: patient unable to advocate for self, high level of needs  Strengths: parents engaged and strong advocates  Date to Achieve By: 3/31/21  Patient/caregiver expressed understanding of goal: Yes, parents stated understanding    Action steps to achieve this goal:  1. Parents will continue working with TCU staff to identify patient needs and arrange for appropriate supports and resources prior to TCU discharge.  2. Parents will notify Care Coordination with updates, barriers, or gaps in care.  3. Care Coordination will collaborate with TCU and group home staff as indicated.           Action Plans on File: None  Advance Care Plans/Directives Type: None on file       My Medical and Care Information  Problem List   Patient Active Problem List   Diagnosis     Cardiac arrest (H)      Current Medications:  Current Outpatient Medications   Medication Instructions     acetaminophen (TYLENOL) 650 mg, Oral, EVERY 6 HOURS PRN     amantadine (SYMMETREL) 100 mg, Per G Tube, EVERY 12 HOURS, VIA PEG-Tube     aspirin (ASA) 81 mg, Per Feeding Tube, DAILY     atorvastatin (LIPITOR) 40 mg, Per G Tube, AT BEDTIME, Via PEG-tube     bacitracin 500 UNIT/GM OINT No dose, route, or frequency recorded.     bacitracin-neomycin-polymyxin (NEOSPORIN) 400-5-5000 external ointment 1 Application, Topical, 2 TIMES DAILY     haloperidol (HALDOL) 5 mg, Enteral, 3 TIMES DAILY, Via PEG-tube     metoprolol tartrate (LOPRESSOR) 25 mg, Oral, 2 TIMES DAILY     mineral oil-hydrophilic  petrolatum (AQUAPHOR) external ointment Topical, 2 TIMES DAILY     omeprazole (PRILOSEC) 40 mg, Per Feeding Tube, EVERY MORNING BEFORE BREAKFAST     PARoxetine (PAXIL) 20 mg, Oral or Feeding Tube, EVERY MORNING     QUEtiapine (SEROQUEL) 50 mg, Per Feeding Tube, 3 TIMES DAILY     ticagrelor (BRILINTA) 90 mg, Oral or Feeding Tube, 2 TIMES DAILY     ticagrelor (BRILINTA) 90 mg, Nasogastric, 2 TIMES DAILY     traZODone (DESYREL) 50 mg, Per G Tube, AT BEDTIME     valproic acid 250 mg, Enteral, 2 TIMES DAILY     Care Coordination Start Date: 1/20/2021   Frequency of Care Coordination: 2 weeks   Form Last Updated: 01/20/2021

## 2021-01-20 NOTE — LETTER
[place] CARE COORDINATION  [facility]  [address], [place] [address]    January 20, 2021    Scot Bishop  [address]  [place] [address]      Dear Scot/Parents,    I am a clinic care coordinator who works with Cachorro Morales MD at [facility]. I wanted to introduce myself and provide you with my contact information for you to be able to call me with any questions or concerns. Below is a description of clinic care coordination and how I can further assist you.      The clinic care coordination team is made up of a registered nurse,  and community health worker who understand the health care system. The goal of clinic care coordination is to help you manage your health and improve access to the health care system in the most efficient manner. The team can assist you in meeting your health care goals by providing education, coordinating services, strengthening the communication among your providers and supporting you with any resource needs.    Please feel free to contact me with any questions or concerns. We are focused on providing you with the highest-quality healthcare experience possible and that all starts with you.     Sincerely,     Frandy Doe RN  Clinic Care Coordinator  [facility]  [facility]  Ph: [phone]      Enclosed: I have enclosed a copy of the Complex Care Plan. This has helpful information and goals that we have talked about. Please keep this in an easy to access place to use as needed.

## 2021-01-20 NOTE — PROGRESS NOTES
Clinic Care Coordination Contact  [facility]/Voicemail    Referral Source: PCP  Clinical Data: Care Coordinator Outreach  PCP referral; request to assist patient's family with anticipated group home transition and need for additional supports/resources.    Outreach attempted x 1.  Briefly spoke with patient's father who noted he was on his way to an appointment and not available to talk; he requested a call back at a later time.    Plan: Care Coordinator will call back to patient's father later today as requested.      Care Team Conversations  CC RN left [facility] for Jennifer, discharge planner at [facility]; requested call back to further discuss patient's situation and identify gaps in care.    Frandy Doe, RN  Clinic Care Coordinator  [facility]  Laquita Jimenez [facility]  Ph: [phone]

## 2021-01-20 NOTE — PROGRESS NOTES
"Clinic Care Coordination Contact    OUTREACH    Referral Information:  Referral Source: PCP  Primary Diagnosis: Psychosocial  Chief Complaint   Patient presents with     Clinic Care Coordination - Initial     PCP referral      Universal Utilization: reviewed on this date  Utilization    Last refreshed: 1/20/2021  4:09 PM: Hospital Admissions 1           Last refreshed: 1/20/2021  4:09 PM: ED Visits 1           Last refreshed: 1/20/2021  4:09 PM: No Show Count (past year) 1              Current as of: 1/20/2021  4:09 PM          Care Coordination referral received from PCP related to needs noted by parents at yesterday's office visit; see PCP note for details.    CC RN called and spoke with patient's parents on speaker phone; introduced self, discussed role of Care Coordination, and explained reason for call.    Patient currently at [facility] (ph: [phone]).  Patient's parents have been in contact with TCU coordinators: Ladonna Ruth - ph: [phone], Consuelo - ph: [phone].    Per parents, current plan is for patient to discharge on 2/5/21 to [facility] group home which they identified as a \"high level needs\" group home where he will have 24/7 nursing care provided.    Patient's parents indicated that main focuses at this time is to ensure a smooth transition for patient from TCU to group home as well as to ensure patient has adequate supports and resources in place at group home.    Parents noted that PT, OT, ST are not offered through group home services.  CC RN directed parents to speak with TCU CC about arranging home care services at patient's group home so he can receive these therapies; they stated understanding.    Parent confirmed they plan for Dr. Morales to be patient's new PCP moving forward; Care Team information updated accordingly.    CC RN discussed Clinic Care Coordination; offered enrollment for additional support; parents agreeable and appreciative; see Goal " below.    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    Functional Status:  Unable to assess.    Living Situation:  Current living arrangement: (group home) - will be moving to [facility], anticipated date of move is 2/5/21  Type of residence: [facility]    Lifestyle & Psychosocial Needs:  Tube Feeding: Yes  Tube Feeding: Other (GJ-tube)    Informal Support system: Parent   Socioeconomic History     Marital status: Single     Spouse name: Not on file     Number of children: Not on file     Years of education: Not on file     Highest education level: Not on file     Tobacco Use     Smoking status: Former Smoker     Smokeless tobacco: Never Used   Substance and Sexual Activity     Alcohol use: Not Currently     Drug use: Not Currently     Sexual activity: Not Currently     Care Coordinator has reviewed patient's Social Determinants of Health (SDoH) on this date. Upon review, changes were not  made.      Resources and Interventions:  Community Resources: Transitional Care, Group Home    Advance Care Plan/Directive  Advanced Care Plans/Directives on file: No    Referrals Placed: None     Goals:   Goals        General    1. Psychosocial     Notes - Note created  1/20/2021  4:36 PM by Frandy Doe RN    Goal Statement: Patient will have adequate supports and resources in place to ensure a smooth transition from TCU to group home.  Date Goal set: 1/20/21  Barriers: patient unable to advocate for self, high level of needs  Strengths: parents engaged and strong advocates  Date to Achieve By: 3/31/21  Patient/caregiver expressed understanding of goal: Yes, parents stated  understanding    Action steps to achieve this goal:  1. Parents will continue working with TCU staff to identify patient needs and arrange for appropriate supports and resources prior to TCU discharge.  2. Parents will notify Care Coordination with updates, barriers, or gaps in care.  3. Care Coordination will collaborate with TCU and group home staff as indicated.        Patient/Caregiver understanding: Yes    Outreach Frequency: 2 weeks    Plan:    CC RN will send patient/parents Care Coordination introduction letter and Complex Care Plan for reference.    CC RN to outreach to TCU staff again using contact information patient's parents provided to further discuss patient's discharge plans.    Patient's parents agreed to contact CC RN with updates or additional needs.    CC RN will outreach to patient's parents/care team in approximately 2 weeks to get updates on patient status, assess goal progress, and offer additional support and resources as indicated.    Frandy Doe, RN  Clinic Care Coordinator  [facility]  Laquita Jimenez [place] [facility]  Ph: [phone]

## 2021-01-21 ENCOUNTER — OFFICE VISIT - HEALTHEAST (OUTPATIENT)
Dept: GERIATRICS | Facility: CLINIC | Age: 41
End: 2021-01-21

## 2021-01-21 ENCOUNTER — PATIENT OUTREACH (OUTPATIENT)
Dept: CARE COORDINATION | Facility: CLINIC | Age: 41
End: 2021-01-21

## 2021-01-21 DIAGNOSIS — I95.2 HYPOTENSION DUE TO DRUGS: ICD-10-CM

## 2021-01-21 DIAGNOSIS — R13.10 DYSPHAGIA, UNSPECIFIED TYPE: ICD-10-CM

## 2021-01-21 DIAGNOSIS — Z93.4 GASTROJEJUNOSTOMY TUBE STATUS (H): ICD-10-CM

## 2021-01-21 DIAGNOSIS — R46.89 AGGRESSION: ICD-10-CM

## 2021-01-21 DIAGNOSIS — G93.1 HYPOXIC BRAIN INJURY (H): ICD-10-CM

## 2021-01-21 NOTE — PROGRESS NOTES
Clinic Care Coordination Contact  Care Team Conversations    CC RN received return voicemail message from Consuelo, care coordinator at [facility]; she left her contact information (ph: [phone]) and requested return call to further discuss patient.    CC RN left return voicemail message for Consuelo noting her earlier voicemail; highlighted CC RN involvement due to recent establish care visit patient had with Dr. Morales and parents' concerns regarding ensuring patient has adequate supports/resources in place prior to discharge to group home.  CC RN left callback number and requested return call to discuss further.    UPDATE at 1223: Received return call from Consuelo who provided details regarding current discharge planning:    They are working with [facility] on the waiver process; hoping to have him open to CADI waiver by the time he discharges to group home.    They are working on getting a specialty-fit wheelchair for him; he'll rent one from [employer] until he can get his own.  He's currently in a high-back wheelchair but he scoots around and slides out of it a lot.    They are working with [facility] for feeding tube supplies; trying to get him a feeding tube pump which will make things easier for group home staff.    Patient has been [facility] with [facility] and patient has not been able to progress to safely eating a full meal, which is why he continues on tube feeding.  They will arrange for him to receive ST at group home as well as PT and OT.      They are working with [facility] to get him briefs.    Patient's tentative discharge date has been moved up to 2/4/21.    Frandy Doe, RN  Clinic Care Coordinator  [facility]  [facility]  Ph: [phone]

## 2021-01-26 ENCOUNTER — OFFICE VISIT - HEALTHEAST (OUTPATIENT)
Dept: GERIATRICS | Facility: CLINIC | Age: 41
End: 2021-01-26

## 2021-01-26 ENCOUNTER — TELEPHONE (OUTPATIENT)
Dept: INTERNAL MEDICINE | Facility: CLINIC | Age: 41
End: 2021-01-26

## 2021-01-26 DIAGNOSIS — G93.1 HYPOXIC BRAIN INJURY (H): ICD-10-CM

## 2021-01-26 DIAGNOSIS — I95.2 HYPOTENSION DUE TO DRUGS: ICD-10-CM

## 2021-01-26 DIAGNOSIS — Z93.4 GASTROJEJUNOSTOMY TUBE STATUS (H): ICD-10-CM

## 2021-01-26 DIAGNOSIS — G93.1 HYPOXIC BRAIN DAMAGE (H): ICD-10-CM

## 2021-01-26 DIAGNOSIS — R13.10 DYSPHAGIA, UNSPECIFIED TYPE: ICD-10-CM

## 2021-01-26 NOTE — TELEPHONE ENCOUNTER
[facility] calling,    Needed Cardiology referral information faxed to office so they can set up consult/eval;     Referral faxed to: Marguerite, [phone]

## 2021-02-01 ENCOUNTER — RECORDS - HEALTHEAST (OUTPATIENT)
Dept: LAB | Facility: CLINIC | Age: 41
End: 2021-02-01

## 2021-02-01 LAB
SARS-COV-2 PCR COMMENT: NORMAL
SARS-COV-2 RNA SPEC QL NAA+PROBE: NEGATIVE
SARS-COV-2 VIRUS SPECIMEN SOURCE: NORMAL

## 2021-02-02 ENCOUNTER — HOME CARE/HOSPICE - HEALTHEAST (OUTPATIENT)
Dept: HOME HEALTH SERVICES | Facility: HOME HEALTH | Age: 41
End: 2021-02-02

## 2021-02-02 ENCOUNTER — OFFICE VISIT - HEALTHEAST (OUTPATIENT)
Dept: GERIATRICS | Facility: CLINIC | Age: 41
End: 2021-02-02

## 2021-02-02 ENCOUNTER — MEDICAL CORRESPONDENCE (OUTPATIENT)
Dept: HEALTH INFORMATION MANAGEMENT | Facility: CLINIC | Age: 41
End: 2021-02-02

## 2021-02-02 ENCOUNTER — TELEPHONE (OUTPATIENT)
Dept: CARE COORDINATION | Facility: CLINIC | Age: 41
End: 2021-02-02

## 2021-02-02 ENCOUNTER — TRANSFERRED RECORDS (OUTPATIENT)
Dept: HEALTH INFORMATION MANAGEMENT | Facility: CLINIC | Age: 41
End: 2021-02-02

## 2021-02-02 DIAGNOSIS — Z93.4 GASTROJEJUNOSTOMY TUBE STATUS (H): ICD-10-CM

## 2021-02-02 DIAGNOSIS — J96.01 ACUTE RESPIRATORY FAILURE WITH HYPOXIA (H): ICD-10-CM

## 2021-02-02 DIAGNOSIS — R13.10 DYSPHAGIA, UNSPECIFIED TYPE: ICD-10-CM

## 2021-02-02 DIAGNOSIS — I95.2 HYPOTENSION DUE TO DRUGS: ICD-10-CM

## 2021-02-02 DIAGNOSIS — G93.1 HYPOXIC BRAIN INJURY (H): ICD-10-CM

## 2021-02-02 DIAGNOSIS — I46.9 CARDIAC ARREST (H): ICD-10-CM

## 2021-02-02 DIAGNOSIS — G93.1 HYPOXIC BRAIN DAMAGE (H): ICD-10-CM

## 2021-02-02 NOTE — TELEPHONE ENCOUNTER
CC RN received voicemail from patient's mother Vandana (ph: [phone]).  She noted patient's Psychiatry appointment scheduled for tomorrow 2/3/21 and inquired if provider can do 3-way call with patient and parents for visit as patient is still in TCU.    CC RN will send message to provider to request that they follow up with patient's mother to discuss.    Frandy Doe RN  Clinic Care Coordinator  [facility]  [facility]  Ph: [phone]

## 2021-02-02 NOTE — TELEPHONE ENCOUNTER
I have forwarded this to the intake team as I am unclear if I can see someone while they are still in the hospital.  I will let them look into this.  Thanks for letting me know    Intake:  I also sent in an email to the dept. ASHLEY

## 2021-02-04 ENCOUNTER — DOCUMENTATION ONLY (OUTPATIENT)
Dept: OTHER | Facility: CLINIC | Age: 41
End: 2021-02-04

## 2021-02-04 ENCOUNTER — AMBULATORY - HEALTHEAST (OUTPATIENT)
Dept: OTHER | Facility: CLINIC | Age: 41
End: 2021-02-04

## 2021-02-05 ENCOUNTER — PATIENT OUTREACH (OUTPATIENT)
Dept: NURSING | Facility: CLINIC | Age: 41
End: 2021-02-05
Payer: COMMERCIAL

## 2021-02-05 ENCOUNTER — AMBULATORY - HEALTHEAST (OUTPATIENT)
Dept: GERIATRICS | Facility: CLINIC | Age: 41
End: 2021-02-05

## 2021-02-05 ENCOUNTER — TELEPHONE (OUTPATIENT)
Dept: CARE COORDINATION | Facility: CLINIC | Age: 41
End: 2021-02-05

## 2021-02-05 DIAGNOSIS — S06.9X9S BRAIN INJURY WITH LOSS OF CONSCIOUSNESS, SEQUELA (H): ICD-10-CM

## 2021-02-05 DIAGNOSIS — Z78.9 ON ENTERAL NUTRITION: Primary | ICD-10-CM

## 2021-02-05 DIAGNOSIS — I46.9 CARDIAC ARREST (H): ICD-10-CM

## 2021-02-05 ASSESSMENT — ACTIVITIES OF DAILY LIVING (ADL)
DEPENDENT_IADLS:: CLEANING;COOKING;LAUNDRY;SHOPPING;MEAL PREPARATION;MEDICATION MANAGEMENT;MONEY MANAGEMENT;TRANSPORTATION;INCONTINENCE

## 2021-02-05 NOTE — TELEPHONE ENCOUNTER
CC RN spoke with José Miguel,  at patient's [facility] group home (ph: [phone]).  Joés Miguel is requesting DME orders from PCP for the following:    __gloves, size Medium    Fax to [facility] at [phone]  __4x4 split gauze sponges    Fax to [facility] at [phone]  __Monoject enteral feeding syringes, 60 mL    Fax to José Miguel at [facility] home: [phone]    CC RN will pend orders and send to PCP for review approval.    Frandy Doe, RN  Clinic Care Coordinator  [facility]  [facility]quita Jimenez, [facility]  Ph: [phone]

## 2021-02-05 NOTE — PROGRESS NOTES
Clinic Care Coordination Contact    Follow Up Progress Note -- CC RN outreach to patient's parent and care team following discharge from [facility] to [facility] yesterday 2/4/21.    Assessment:     CC RN followed up with JIMMY Cordero at [facility].    Consuelo confirmed patient discharged to [facility] yesterday as planned.      Patient will have 24/7 nursing care/supervision through his group home in addition to PT, OT, ST services through [facility].      Consuelo provided CC RN with a list of patient's services and associated contact; CC RN updated patient's Care Team and Care Coordination Note information accordingly.    Consuelo noted there was a discrepancy with the feeding tube syringes they sent to the group home but updated that the group home director was in the process of working on addressing this.    CC RN called spoke with patient's father and step-mother on speaker phone.    Patient's father reported that things seem to be going well so far, though they haven't received many updates yet.    Patient's father inquired about home care services; CC RN reviewed that PT, OT, ST were ordered through [facility] and the initial visits should be getting scheduled soon.  CC RN provided patient's father with [facility] Care phone number for reference.    CC RN reviewed patient's appointments; father and step-mother aware of patient's upcoming Psychiatry and Cardiology appointments.  CC RN assisted patient's father/step-mother to schedule follow-up appointment with PCP for 2/15/21 at 1:00 pm.    CC RN reviewed plan to also reach out to patient's group home staff to ensure things are going well follow transition from TCU; they appreciated this.    Patient's father/step-mother denied additional questions/concerns at present time.    CC RN called and spoke with José Miguel,  at [facility] (ph: [phone]).    José Miguel noted that patient seems to be doing fairly well thus far; no outstanding patient concerns noted.    José Miguel did request CC RN assistance with obtaining DME orders from PCP for gloves, split-gauze sponges, and enteral feeding syringes.  She informed that the orders for gloves and gauze should be faxed to [facility] while the order for syringes can be faxed directly to her.  See separate Telephone Encounter for details and correspondence with PCP.    At this time, their group home's  Franny is working to identify a supply company that carries the specific syringes and tips they are needing.  They will alert clinic/CC RN if different orders are needed.    José Miguel noted CC RN contact information as well as confirmed PCP clinic information; she agreed to call with any patient questions, concerns, or needs.    Goals addressed this encounter:   Goals Addressed                 This Visit's Progress      1. Psychosocial   50%     Goal Statement: Patient will have adequate supports and resources in place to ensure a smooth transition from TCU to group [place].  Date Goal set: 1/20/21  Barriers: patient unable to advocate for self, high level of needs  Strengths: parents engaged and strong advocates  Date to Achieve By: 3/31/21  Patient/caregiver expressed understanding of goal: Yes, parents stated understanding    Action steps to achieve this goal:  1. Parents will continue working with TCU staff to identify patient needs and arrange for appropriate supports and resources prior to TCU discharge.  2. Parents will notify Care Coordination with updates, barriers, or gaps in care.  3. Care Coordination will collaborate with TCU and group home staff as indicated.        Intervention/Education provided during outreach:    CC RN sent communication to PCP regarding DME order requests from [facility].    Outreach Frequency: 2 weeks    Plan:     CC RN will send DME order requests to PCP  for review and approval; orders to be faxed by clinic staff to [facility] Medical and group home director as outlined.    Patient will attend upcoming appointments as scheduled.    Patient will work with [facility] Care as indicated.    CC RN will outreach to patient's parent and care team in approximately 2 weeks to get updates on patient status, assess goal progress, and offer additional support and resources as indicated.    Frandy Doe RN  Clinic Care Coordinator  [facility]  [facility]ita Jimenez, [facility]  Ph: [phone]

## 2021-02-10 ENCOUNTER — TELEPHONE (OUTPATIENT)
Dept: INTERNAL MEDICINE | Facility: CLINIC | Age: 41
End: 2021-02-10

## 2021-02-10 NOTE — TELEPHONE ENCOUNTER
[facility] now requests orders and shares plan of care/discharge summaries for some patients through [facility].  Please REPLY TO THIS MESSAGE OR ROUTE BACK TO THE AUTHOR in order to give authorization for orders when needed.  This is considered a verbal order, you will still receive a faxed copy of orders for signature.  Thank you for your assistance in improving collaboration for our patients.    ORDER    ST 2w4 for diet advancement, dysphagia therapy, aspiration risk reduction, implementation of free water protocol.    Caleb Bah MS, CCC-SLP

## 2021-02-11 ENCOUNTER — VIRTUAL VISIT (OUTPATIENT)
Dept: PSYCHOLOGY | Facility: CLINIC | Age: 41
End: 2021-02-11
Payer: COMMERCIAL

## 2021-02-11 DIAGNOSIS — F34.81 DISRUPTIVE MOOD DYSREGULATION DISORDER (H): Primary | ICD-10-CM

## 2021-02-11 PROCEDURE — 99204 OFFICE O/P NEW MOD 45 MIN: CPT | Mod: 95 | Performed by: NURSE PRACTITIONER

## 2021-02-11 RX ORDER — HALOPERIDOL 5 MG/1
5 TABLET ORAL 2 TIMES DAILY
Qty: 60 TABLET | Refills: 1 | Status: SHIPPED | OUTPATIENT
Start: 2021-02-11 | End: 2021-03-03

## 2021-02-11 NOTE — PATIENT INSTRUCTIONS
1.  Decrease Haldol to 5 mg twice daily  2.  Continue quetiapine 50 mg 3 times daily  3.  Continue Paxil 20 mg daily  4.  Continue trazodone 50 mg at bedtime  5.  All medications given through feeding tube        Continue all other medical directions per primary care provider.     Continue all other medications as reviewed per electronic medical record today.     Safety plan reviewed. To the Emergency Department as needed or call after hours crisis line at [phone] or [phone]. [place] Crisis Text Line: Text [place] to 530176  or  Suicide LifeLine Chat: [employer]Ocean [employer].org/chat/    To schedule individual or family therapy, call [facility] at [phone].     Schedule an appointment with me in 2 weeks weeks or sooner as needed.  Call [facility] at [phone] to schedule.    Follow up with primary care provider as planned or for acute medical concerns.    Call the psychiatric nurse line with medication questions or concerns at [phone].    [employer] may be used to communicate with your provider, but this is not intended to be used for emergencies.    Crisis Resources:    National Suicide Prevention Lifeline: 972.900.6192 (TTY: 957.971.9691). Call anytime for help.  (www.suicidepreventionlifeline.org)  National [place] on Mental Illness (www.melvin.org): 822.520.8053 or 295-921-7696.   Mental Health Association (www.mentalhealth.org): 135.872.7875 or 728-117-7660.  [place] Crisis Text Line: Text [place] to 772630  Suicide LifeLine Chat: [employer].org/chat
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
## 2021-02-12 ENCOUNTER — TELEPHONE (OUTPATIENT)
Dept: INTERNAL MEDICINE | Facility: CLINIC | Age: 41
End: 2021-02-12

## 2021-02-12 NOTE — TELEPHONE ENCOUNTER
Aaron called back. Please sign orders and send back through Epic or call with Verbal. He wants to see pt on Monday 2/15.

## 2021-02-12 NOTE — TELEPHONE ENCOUNTER
Completed homecare OT evaluation, request ok for visits 3m2 to address Wheelchair seating, shower chair and hand splinting/ROM    Request Ok for hand splint, will send over detailed order int he near future     Also request at the appointmet he has 2/15, that you address the need for the Wheelchair in the notes, F2F.  Scot is using a loaner tilt in space wheelchair and will be assessed for his own wheelchair 2/16  Thank you, Ruby Silva OTRL

## 2021-02-15 ENCOUNTER — VIRTUAL VISIT (OUTPATIENT)
Dept: INTERNAL MEDICINE | Facility: CLINIC | Age: 41
End: 2021-02-15
Payer: COMMERCIAL

## 2021-02-15 DIAGNOSIS — G93.1 BRAIN HYPOXIC INJURY (H): ICD-10-CM

## 2021-02-15 DIAGNOSIS — Z09 HOSPITAL DISCHARGE FOLLOW-UP: Primary | ICD-10-CM

## 2021-02-15 DIAGNOSIS — I46.9 CARDIAC ARREST (H): ICD-10-CM

## 2021-02-15 PROCEDURE — 99214 OFFICE O/P EST MOD 30 MIN: CPT | Mod: GT | Performed by: INTERNAL MEDICINE

## 2021-02-15 NOTE — PROGRESS NOTES
Scot is a 40 year old who is being evaluated via a billable video visit.      How would you like to obtain your AVS? MyChart  If the video visit is dropped, the invitation should be resent by: Text to cell phone: [phone]  Will anyone else be joining your video visit? Yes: Pt dad. How would they like to receive their invitation? Text to cell phone: [phone]      Video Start Time: 1258PM    Assessment & Plan     Hospital discharge follow-up  Nutrition referral sent per request of nursing home staff.  - NUTRITION REFERRAL    Brain hypoxic injury (H)  Patient has been rolling over in his feeding tube at night therefore request has been made for nutrition assessment for proper feeding changes as well as approval to switch feedings to daytime.  - NUTRITION REFERRAL  - Catheterization Supplies Order for DME - ONLY FOR DME    Patient may benefit from long-term wheelchair due to inability to mobilize.  Wheelchair should include foot supports and will be needed lifetime.    Cardiac arrest (H)  Stable at present time continue with current medical support.       See Patient Instructions    No follow-ups on file.    Cachorro Morales MD  [facility]   Scot is a 40 year old who presents for the following health issues  accompanied by his care provider:    HPI     Also looking to request a condom catheter, and leg bag and night bag. Goes through [facility] in [place]. Would like to change to Large briefs. And a referral for nutritionist. Fax to [phone].    He was recently seen by the mental health providers for issues in regards to agitation and started on a combination of medications accordingly.    Cording to nursing support staff he is doing much better on that combined changes with his mental health medications.  He is having some issues with the feeding tubes at night getting kinked due to the fact that he rolls over on them and they would like to discuss  "things with the nutritionist.    It is also apparent that he did not receive a flu vaccination thus the family was wondering if he could receive 1.  He has tolerated these in the past.    DISCHARGE DIAGNOSIS:     1. Acute respiratory failure with hypoxia (H)   2. Cardiac arrest (H)   3. Hypoxic brain damage (H)   4. Hypoxic brain injury (H)   5. Dysphagia, unspecified type      He had a long hospital stay at [facility] (6/25-7/31/20) for PEA/VF arrest and anterior STEMI, angiography revealed a thrombotic occlusion of the LAD successfully treated with aspiration thrombectomy and PCI w/NAZIA of proximal LAD. Hypoxic Brain Injury secondary to VF arrest with persistent moderate-severe encephalopathy, TBI, nonverbal, respiratory failure requiring trach placement, dysphagia (on tube feeding) who was admitted to [facility] on 11/11/2020 from a TCU for evaluation of increased agitation and aggressive behaviors.      Work up on admission negative for infectious source. Neurology and psychiatry were consulted.      Neurology suspected his symptoms were possibly concerning for toxic metabolic (keppra, mild hyperammonemia) etiology. they discontinued Keppra and started Depakote (will need liver function test monitoring). Stopped bromocryptine. MRI head neg for acute findings. Psychiatry following and assisting with management.  Palliative care consulted for assistance with goals of care discussion.      Family care conference held on 11/20 with palliative care and neurology. MRI brain findings and prognosis were discussed with family members by neurology. Per neurology, \"he per definition is now in a permanent vegetative or minimally conscious state, this condition most likely will be permanent and it is highly unlikely that he will regain lost cortical functions\"     Hospital Follow-up Visit:    Hospital/Nursing Home/IP Rehab Facility: [facility]   Date of Admission: 1/20/21  Date of Discharge: 2/4/21  Reason(s) for " Admission: Hypoxic brain injury      Was your hospitalization related to COVID-19? No   Problems taking medications regularly:  None  Medication changes since discharge: None  Problems adhering to non-medication therapy:  None    Summary of hospitalization:  Discharge summary unavailable  Diagnostic Tests/Treatments reviewed.  Follow up needed: noted  Other Healthcare Providers Involved in Patient s Care:         None  Update since discharge: stable. Post Discharge Medication Reconciliation: discharge medications reconciled, continue medications without change.  Plan of care communicated with patient and family              Review of Systems; history is challenging due to nonverbal status of patient. Thus per family  CONSTITUTIONAL: NEGATIVE for fever, chills, change in weight  EYES: NEGATIVE for vision changes or irritation  RESP: NEGATIVE for significant cough or SOB  CV: NEGATIVE for chest pain, palpitations  MUSCULOSKELETAL: NEGATIVE for significant arthralgias or myalgia  HEME: NEGATIVE for bleeding problems  PSYCHIATRIC: + for changes in mood or affect      Objective       Vitals:  No vitals were obtained today due to virtual visit.    Physical Exam:    GENERAL:  alert and no distress  EYES: Eyes grossly normal to inspection.  No discharge or erythema, or obvious scleral/conjunctival abnormalities.  RESP: No audible wheeze, cough, or visible cyanosis.  No visible retractions or increased work of breathing.    NEURO:  Noted TBI.  PSYCH: Mentation appears abnormal      Video-Visit Details    Type of service:  Video Visit    Video End Time:115PM    Originating Location (pt. Location): Long term Care    Distant Location (provider location):  [facility]     Platform used for Video Visit: [place]

## 2021-02-16 ENCOUNTER — TELEPHONE (OUTPATIENT)
Dept: INTERNAL MEDICINE | Facility: CLINIC | Age: 41
End: 2021-02-16

## 2021-02-16 NOTE — TELEPHONE ENCOUNTER
Nutrition Education Scheduling Outreach #1:    Call to patient to schedule. No answer/busy.    Plan for 2nd outreach attempt within 1 week.    Mary Anne Romero OnCall  Diabetes and Nutrition Scheduling

## 2021-02-16 NOTE — TELEPHONE ENCOUNTER
I see Scot had a virtual visit with you on 2/15/21.  I am requesting an addendum added to address his need for a wheelchair, F2F. If that is not possible I will request they set up another appt for the F2F    I am working with [facility] on a custom Wheelchair, a Tilt in Space style that allows him to be tilted back, for change in position, reduce pressure and improve his safety.  At this time Scot is not able to stand and do not anticipate him to walk for his main mobility.  I will have a paperwork faxed over once completed for the Wheelchair for your signature.  Thank you, Ruby Silva OTRL

## 2021-02-19 ENCOUNTER — PATIENT OUTREACH (OUTPATIENT)
Dept: NURSING | Facility: CLINIC | Age: 41
End: 2021-02-19
Payer: COMMERCIAL

## 2021-02-19 ENCOUNTER — OFFICE VISIT (OUTPATIENT)
Dept: CARDIOLOGY | Facility: CLINIC | Age: 41
End: 2021-02-19
Attending: INTERNAL MEDICINE
Payer: COMMERCIAL

## 2021-02-19 VITALS
SYSTOLIC BLOOD PRESSURE: 102 MMHG | OXYGEN SATURATION: 98 % | HEART RATE: 98 BPM | HEIGHT: 68 IN | DIASTOLIC BLOOD PRESSURE: 67 MMHG | WEIGHT: 239 LBS | BODY MASS INDEX: 36.22 KG/M2

## 2021-02-19 DIAGNOSIS — I50.21 ACUTE SYSTOLIC HEART FAILURE (H): ICD-10-CM

## 2021-02-19 DIAGNOSIS — I25.10 CORONARY ARTERY DISEASE DUE TO LIPID RICH PLAQUE: ICD-10-CM

## 2021-02-19 DIAGNOSIS — I46.9 CARDIAC ARREST (H): ICD-10-CM

## 2021-02-19 DIAGNOSIS — I25.83 CORONARY ARTERY DISEASE DUE TO LIPID RICH PLAQUE: ICD-10-CM

## 2021-02-19 PROCEDURE — 99204 OFFICE O/P NEW MOD 45 MIN: CPT | Performed by: INTERNAL MEDICINE

## 2021-02-19 RX ORDER — NYSTATIN 100000 U/G
CREAM TOPICAL 2 TIMES DAILY
COMMUNITY
End: 2021-03-24

## 2021-02-19 RX ORDER — METOPROLOL TARTRATE 25 MG/1
12.5 TABLET, FILM COATED ORAL 2 TIMES DAILY
Qty: 90 TABLET | Refills: 3 | Status: SHIPPED | OUTPATIENT
Start: 2021-02-19 | End: 2021-12-08

## 2021-02-19 ASSESSMENT — MIFFLIN-ST. JEOR: SCORE: 1963.6

## 2021-02-19 NOTE — LETTER
[place] CARE COORDINATION  [facility]  [address] [place] [address], [place] [address]    February 19, 2021    Scot Bishop  [address] [facility] [address]    Dear Scot/Parents,    Your Care Team congratulates you on your journey to maintain wellness. This document will help guide you on your journey to maintain a healthy lifestyle.  You can use this to help you overcome any barriers you may encounter.  If you should have any questions or concerns, you can contact the members of your Care Team or contact your Primary Care Clinic for assistance.     Health Maintenance  Health Maintenance Reviewed: Due/Overdue   Health Maintenance Due   Topic Date Due     PREVENTIVE CARE VISIT  1979     HIV SCREENING  07/08/1994     HEPATITIS C SCREENING  07/08/1997     INFLUENZA VACCINE (1) 09/01/2020     PHQ-2  01/01/2021     My Access Plan  Medical Emergency 911   Primary Clinic Line [facility] - [phone]   24 Hour Appointment Line [phone] or  [phone]) (toll-free)   24 Hour Nurse Line [phone] (toll-free)   Preferred Urgent Care [facility], [phone]   Preferred Hospital [facility]  [phone]   Preferred Pharmacy [facility] - [place], [place] - [address] [place] [address]     Behavioral Health Crisis Line The National Suicide Prevention Lifeline at 1-777.598.7686 or 911     My Care Team Members  Patient Care Team       Relationship Specialty Notifications Start End    Cachorro Morales MD PCP - General Internal Medicine  1/19/21     Phone: [phone] Fax: [phone]         [address] [place] [id number]    Scot Matamoros MD MD Cardiology  6/12/20     Phone: [phone] Fax: [phone]         [address] [facility] [address]    Cachorro Morales MD Assigned PCP   12/24/20     Phone: [phone] Fax: [phone]         [address] [place] [address]  [id number]    EVELINA Myles CM    2/5/21     [facility]    Phone: [phone]         [facility] group home     2/5/21     [facility] group home    Phone: [phone] Fax: [phone]        Melanie financial worker Financial Resource Worker   2/5/21     [facility]    Phone: [phone]                 Goals       COMPLETED: 1. Psychosocial      Goal Statement: Patient will have adequate supports and resources in place to ensure a smooth transition from TCU to group home.  Date Goal set: 1/20/21  Barriers: patient unable to advocate for self, high level of needs  Strengths: parents engaged and strong advocates  Date to Achieve By: 3/31/21  Patient/caregiver expressed understanding of goal: Yes, parents stated understanding    Action steps to achieve this goal:  1. Parents will continue working with TCU staff to identify patient needs and arrange for appropriate supports and resources prior to TCU discharge.  2. Parents will notify Care Coordination with updates, barriers, or gaps in care.  3. Care Coordination will collaborate with TCU and group home staff as indicated.          It has been your Clinic Care Team's pleasure to work with you on your goals.    Regards,  Your Clinic Care Team    Frandy Doe RN  Clinic Care Coordinator  [facility]  Laquita Jimenez [facility]  Ph: [phone]

## 2021-02-19 NOTE — PROGRESS NOTES
HPI:  This is a 40 year old male here with his father who is primary caregiver. He has PMH Hypoxic Brain Injury secondary to VF arrest with persistent moderate-severe encephalopathy, TBI, nonverbal, respiratory failure requiring trach placement, dysphagia (on tube feeding), cardiac arrest (s/p successful aspiration thrombectomy and PCI w/ NAZIA of proximal and mid LAD). He has not established care in cardiology yet due to multiple complications post hospitalization.    Hospitalized 11/11/20-11/27/20 at [facility] for increased agitation: infectious source negative. Neurology consulted and medications switched around. 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 Admission at [facility] 6/25/20-7/31/20. His initial episode was in May 2020.     Per his records:    Hospitalized 5/17/20-6/25/20 for cardiac arrest: Pt reportedly had chest pain that started on 5/16/20. He was using Drano on his pipes at home and did not initially seek medical attention for CP and SOB since it said on the box that Drano can cause those symptoms. He took a shower and had syncopal episode after coming out of the shower. EMS was called; initial rhythm asystole,after several rounds CPR, --> PEA --> VF in the field. After multiple cycles of epinephrine had ROSC. He was intubated in the field. BP was 90s/60s as he was being transported from ED but he had recurrent cardiac arrest on arrival to Cath Lab. ECG showed ST elevation in aVR. He was promptly placed on VA ECMO. He had thrombotic occlusion of proximal LAD and underwent successful aspiration thrombectomy and PCI w/ NAZIA of proximal and mid LAD. Patient remained in NSR with rates in the 110-120's. Suspect ongoing tachycardia d/t evolving MI and post-arrest state. TTE 6/10 showed LVEF 36% with LAD territory akinesis, RV normal. Patient now on GDMT. 5/27, pt had 23 second run of VT, now ST with rare PVCs. ECMO 5/17-5/19,  IABP 5/19-5/20, trach 6/8, PEG 6/9.    He is here today and has not yet had repeat echocardiogram. Has speech, physical and occupational therapy. Blood pressure has been low and occasional tachycardia.      Last echocardiogram reviewed:     Interpretation Summary  LVEF 36% based on biplane 2D tracing.  LAD territory akinesis.  Right ventricular function, chamber size, wall motion, and thickness are  normal.  The inferior vena cava is normal.  No pericardial effusion is present.  There has been no change.    ASSESSMENT/PLAN:    This is a 40 year old male s/p cardiac arrest (s/p successful aspiration thrombectomy and PCI w/ NAZIA of proximal and mid LAD) with chronic encephalopathy here to establish care.     We discussed (his father and I) long term management which includes medical optimization, monitoring for residual cardiac dysfunction, BP and HR control and reconditioning of the heart. His father is under a lot of stress right as caregiver and I can happily follow up with Scot every few months to ensure everything is under control. I hope with his adequate medical optimization his LV function improves. Will recheck an echocardiogram. Given low blood pressure I will decrease metoprolol which may help his symptoms of agitation improve (better perfusion). I have also placed cardiac rehab referral to give an assessment of overall goals moving forward given his difficult situation to improve cardiac conditioning given limitations.    Follow up in 3 months.    Bibi Marcus MD MSc  [facility]      PAST MEDICAL HISTORY  History reviewed. No pertinent past medical history.    CURRENT MEDICATIONS  Current Outpatient Medications   Medication Sig Dispense Refill     acetaminophen (TYLENOL) 325 MG tablet Take 2 tablets (650 mg) by mouth every 6 hours as needed for mild pain or fever (Patient taking differently: Take 650 mg by mouth every 6 hours as needed for mild pain or fever VIA PEG-Tube) 90 tablet 1      amantadine (SYMMETREL) 50 MG/5ML syrup 100 mg by Per G Tube route every 12 hours VIA PEG-Tube       aspirin (ASA) 81 MG chewable tablet 1 tablet (81 mg) by Per Feeding Tube route daily (Patient taking differently: 81 mg by Per Feeding Tube route daily Via PEG-tube) 90 tablet 3     atorvastatin (LIPITOR) 40 MG tablet 40 mg by Per G Tube route At Bedtime Via PEG-tube       bacitracin 500 UNIT/GM OINT        bacitracin-neomycin-polymyxin (NEOSPORIN) 400-5-5000 external ointment Apply 1 Application topically 2 times daily        Emollient (AQUAPHOR ADVANCED THERAPY) OINT        haloperidol (HALDOL) 5 MG tablet Take 1 tablet (5 mg) by mouth 2 times daily Via PEG-tube 60 tablet 1     metoprolol tartrate (LOPRESSOR) 25 MG tablet Take 1 tablet (25 mg) by mouth 2 times daily (Patient taking differently: 25 mg by Per Feeding Tube route 2 times daily Hold if SBP <90, HR <60) 90 tablet 3     mineral oil-hydrophilic petrolatum (AQUAPHOR) external ointment Apply topically 2 times daily        nystatin (MYCOSTATIN) 322364 UNIT/GM external cream Apply topically 2 times daily       PARoxetine (PAXIL) 20 MG tablet 20 mg by Oral or Feeding Tube route every morning        QUEtiapine (SEROQUEL) 25 MG tablet 50 mg by Per Feeding Tube route 3 times daily        ticagrelor (BRILINTA) 90 MG tablet 90 mg by Nasogastric route 2 times daily       traZODone (DESYREL) 50 MG tablet 50 mg by Per G Tube route At Bedtime        Valproate Sodium (VALPROIC ACID) 250 MG/5ML 250 mg by Enteral route 2 times daily        omeprazole (PRILOSEC) 2 mg/mL suspension 40 mg by Per Feeding Tube route every morning (before breakfast)          PAST SURGICAL HISTORY:  Past Surgical History:   Procedure Laterality Date     CV CORONARY ANGIOGRAM N/A 5/17/2020    Procedure: Coronary Angiogram;  Surgeon: Chadd Newby MD;  Location:  HEART CARDIAC CATH LAB     CV EXTRACORPERAL MEMBRANE OXYGENATION N/A 5/17/2020    Procedure: Extracorporeal Membrance Oxygenation;   Surgeon: Chadd Newby MD;  Location:  HEART CARDIAC CATH LAB     CV INTRA-AORTIC BALLOON PUMP INSERTION N/A 5/17/2020    Procedure: Intra-Aortic Balloon Pump Insertion;  Surgeon: Chadd Newby MD;  Location:  HEART CARDIAC CATH LAB     CV PCI STENT DRUG ELUTING N/A 5/17/2020    Procedure: Percutaneous Coronary Intervention Stent Drug Eluting;  Surgeon: Chadd Newby MD;  Location:  HEART CARDIAC CATH LAB     IR GASTROSTOMY TUBE PERCUTANEOUS PLCMNT  6/9/2020     REMOVE EXTRACORPORAL MEMBRANE OXYGENATOR ADULT (OUTSIDE OR) N/A 5/19/2020    Procedure: ECMO Decannulation at Bedside;  Surgeon: Mariano Workman MD;  Location:  OR       ALLERGIES   No Known Allergies    FAMILY HISTORY  Family History   Problem Relation Age of Onset     Parkinsonism Father            SOCIAL HISTORY  Social History     Socioeconomic History     Marital status: Single     Spouse name: Not on file     Number of children: Not on file     Years of education: Not on file     Highest education level: Not on file   Occupational History     Not on file   Social Needs     Financial resource strain: Not on file     Food insecurity     Worry: Not on file     Inability: Not on file     Transportation needs     Medical: Not on file     Non-medical: Not on file   Tobacco Use     Smoking status: Former Smoker     Smokeless tobacco: Never Used   Substance and Sexual Activity     Alcohol use: Not Currently     Drug use: Not Currently     Sexual activity: Not Currently   Lifestyle     Physical activity     Days per week: Not on file     Minutes per session: Not on file     Stress: Not on file   Relationships     Social connections     Talks on phone: Not on file     Gets together: Not on file     Attends Taoist service: Not on file     Active member of club or organization: Not on file     Attends meetings of clubs or organizations: Not on file     Relationship status: Not on file     Intimate partner violence     Fear of  "current or ex partner: Not on file     Emotionally abused: Not on file     Physically abused: Not on file     Forced sexual activity: Not on file   Other Topics Concern     Parent/sibling w/ CABG, MI or angioplasty before 65F 55M? Not Asked   Social History Narrative     Not on file       ROS:   Constitutional: No fever, chills, or sweats. No weight gain/loss   ENT: No visual disturbance, ear ache, epistaxis, sore throat  Allergies/Immunologic: Negative  Respiratory: No cough, hemoptysia  Cardiovascular: As per HPI  GI: No nausea, vomiting, hematemesis, melena, or hematochezia  : No urinary frequency, dysuria, or hematuria  Integument: Negative  Psychiatric: Negative  Neuro: Negative  Endocrinology: Negative   Musculoskeletal: Negative  Vascular: No walking impairment, claudication, ischemic rest pain or nonhealing wounds    EXAM:  /67   Pulse 98   Ht 1.727 m (5' 8\")   Wt 108.4 kg (239 lb)   SpO2 98%   BMI 36.34 kg/m    Mildly agitated today  RRR, no mrg  LUNGS CTAB  ABD soft, nd  EXT: no edema     Labs:  LIPID RESULTS:  Lab Results   Component Value Date    CHOL 170 06/12/2020    HDL 25 (L) 06/12/2020    LDL 94 06/12/2020    TRIG 255 (H) 06/12/2020    NHDL 145 (H) 06/12/2020       LIVER ENZYME RESULTS:  Lab Results   Component Value Date    AST 27 06/25/2020    ALT 45 06/25/2020       CBC RESULTS:  Lab Results   Component Value Date    WBC 11.5 (H) 06/25/2020    RBC 3.60 (L) 06/25/2020    HGB 10.6 (L) 06/25/2020    HCT 34.7 (L) 06/25/2020    MCV 96 06/25/2020    MCH 29.4 06/25/2020    MCHC 30.5 (L) 06/25/2020    RDW 14.3 06/25/2020     06/25/2020       BMP RESULTS:  Lab Results   Component Value Date     06/25/2020    POTASSIUM 3.8 06/25/2020    CHLORIDE 102 06/25/2020    CO2 24 06/25/2020    ANIONGAP 13 06/25/2020    GLC 98 06/25/2020    BUN 29 06/25/2020    CR 0.76 06/25/2020    GFRESTIMATED >90 06/25/2020    GFRESTBLACK >90 06/25/2020    TEN 9.7 06/25/2020        A1C RESULTS:  Lab " Results   Component Value Date    A1C 5.2 05/25/2020

## 2021-02-19 NOTE — LETTER
2/19/2021    Cachorro Morales MD  [address] [facility] [address]    RE: Scot Bishop       Dear Colleague,    I had the pleasure of seeing Scot Bishop in the [facility].      HPI:  This is a 40 year old male here with his father who is primary caregiver. He has PMH Hypoxic Brain Injury secondary to VF arrest with persistent moderate-severe encephalopathy, TBI, nonverbal, respiratory failure requiring trach placement, dysphagia (on tube feeding), cardiac arrest (s/p successful aspiration thrombectomy and PCI w/ NAZIA of proximal and mid LAD). He has not established care in cardiology yet due to multiple complications post hospitalization.    Hospitalized 11/11/20-11/27/20 at [facility] for increased agitation: infectious source negative. Neurology consulted and medications switched around. 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 Admission at [facility] 6/25/20-7/31/20. His initial episode was in May 2020.     Per his records:    Hospitalized 5/17/20-6/25/20 for cardiac arrest: Pt reportedly had chest pain that started on 5/16/20. He was using Drano on his pipes at home and did not initially seek medical attention for CP and SOB since it said on the box that Drano can cause those symptoms. He took a shower and had syncopal episode after coming out of the shower. EMS was called; initial rhythm asystole,after several rounds CPR, --> PEA --> VF in the field. After multiple cycles of epinephrine had ROSC. He was intubated in the field. BP was 90s/60s as he was being transported from ED but he had recurrent cardiac arrest on arrival to Cath Lab. ECG showed ST elevation in aVR. He was promptly placed on VA ECMO. He had thrombotic occlusion of proximal LAD and underwent successful aspiration thrombectomy and PCI w/ NAZIA of proximal and mid LAD. Patient remained in NSR with rates in  the 110-120's. Suspect ongoing tachycardia d/t evolving MI and post-arrest state. TTE 6/10 showed LVEF 36% with LAD territory akinesis, RV normal. Patient now on GDMT. 5/27, pt had 23 second run of VT, now ST with rare PVCs. ECMO 5/17-5/19, IABP 5/19-5/20, trach 6/8, PEG 6/9.    He is here today and has not yet had repeat echocardiogram. Has speech, physical and occupational therapy. Blood pressure has been low and occasional tachycardia.      Last echocardiogram reviewed:     Interpretation Summary  LVEF 36% based on biplane 2D tracing.  LAD territory akinesis.  Right ventricular function, chamber size, wall motion, and thickness are  normal.  The inferior vena cava is normal.  No pericardial effusion is present.  There has been no change.    ASSESSMENT/PLAN:    This is a 40 year old male s/p cardiac arrest (s/p successful aspiration thrombectomy and PCI w/ NAZIA of proximal and mid LAD) with chronic encephalopathy here to establish care.     We discussed (his father and I) long term management which includes medical optimization, monitoring for residual cardiac dysfunction, BP and HR control and reconditioning of the heart. His father is under a lot of stress right as caregiver and I can happily follow up with Scot every few months to ensure everything is under control. I hope with his adequate medical optimization his LV function improves. Will recheck an echocardiogram. Given low blood pressure I will decrease metoprolol which may help his symptoms of agitation improve (better perfusion). I have also placed cardiac rehab referral to give an assessment of overall goals moving forward given his difficult situation to improve cardiac conditioning given limitations.    Follow up in 3 months.    Bibi Marcus MD MSc  [facility]      PAST MEDICAL HISTORY  History reviewed. No pertinent past medical history.    CURRENT MEDICATIONS  Current Outpatient Medications   Medication Sig Dispense Refill      acetaminophen (TYLENOL) 325 MG tablet Take 2 tablets (650 mg) by mouth every 6 hours as needed for mild pain or fever (Patient taking differently: Take 650 mg by mouth every 6 hours as needed for mild pain or fever VIA PEG-Tube) 90 tablet 1     amantadine (SYMMETREL) 50 MG/5ML syrup 100 mg by Per G Tube route every 12 hours VIA PEG-Tube       aspirin (ASA) 81 MG chewable tablet 1 tablet (81 mg) by Per Feeding Tube route daily (Patient taking differently: 81 mg by Per Feeding Tube route daily Via PEG-tube) 90 tablet 3     atorvastatin (LIPITOR) 40 MG tablet 40 mg by Per G Tube route At Bedtime Via PEG-tube       bacitracin 500 UNIT/GM OINT        bacitracin-neomycin-polymyxin (NEOSPORIN) 400-5-5000 external ointment Apply 1 Application topically 2 times daily        Emollient (AQUAPHOR ADVANCED THERAPY) OINT        haloperidol (HALDOL) 5 MG tablet Take 1 tablet (5 mg) by mouth 2 times daily Via PEG-tube 60 tablet 1     metoprolol tartrate (LOPRESSOR) 25 MG tablet Take 1 tablet (25 mg) by mouth 2 times daily (Patient taking differently: 25 mg by Per Feeding Tube route 2 times daily Hold if SBP <90, HR <60) 90 tablet 3     mineral oil-hydrophilic petrolatum (AQUAPHOR) external ointment Apply topically 2 times daily        nystatin (MYCOSTATIN) 513544 UNIT/GM external cream Apply topically 2 times daily       PARoxetine (PAXIL) 20 MG tablet 20 mg by Oral or Feeding Tube route every morning        QUEtiapine (SEROQUEL) 25 MG tablet 50 mg by Per Feeding Tube route 3 times daily        ticagrelor (BRILINTA) 90 MG tablet 90 mg by Nasogastric route 2 times daily       traZODone (DESYREL) 50 MG tablet 50 mg by Per G Tube route At Bedtime        Valproate Sodium (VALPROIC ACID) 250 MG/5ML 250 mg by Enteral route 2 times daily        omeprazole (PRILOSEC) 2 mg/mL suspension 40 mg by Per Feeding Tube route every morning (before breakfast)          PAST SURGICAL HISTORY:  Past Surgical History:   Procedure Laterality Date     CV  CORONARY ANGIOGRAM N/A 5/17/2020    Procedure: Coronary Angiogram;  Surgeon: Chadd Newby MD;  Location:  HEART CARDIAC CATH LAB     CV EXTRACORPERAL MEMBRANE OXYGENATION N/A 5/17/2020    Procedure: Extracorporeal Membrance Oxygenation;  Surgeon: Chadd Newby MD;  Location:  HEART CARDIAC CATH LAB     CV INTRA-AORTIC BALLOON PUMP INSERTION N/A 5/17/2020    Procedure: Intra-Aortic Balloon Pump Insertion;  Surgeon: Chadd Newby MD;  Location:  HEART CARDIAC CATH LAB     CV PCI STENT DRUG ELUTING N/A 5/17/2020    Procedure: Percutaneous Coronary Intervention Stent Drug Eluting;  Surgeon: Chadd Newby MD;  Location:  HEART CARDIAC CATH LAB     IR GASTROSTOMY TUBE PERCUTANEOUS PLCMNT  6/9/2020     REMOVE EXTRACORPORAL MEMBRANE OXYGENATOR ADULT (OUTSIDE OR) N/A 5/19/2020    Procedure: ECMO Decannulation at Bedside;  Surgeon: Mariano Workman MD;  Location:  OR       ALLERGIES   No Known Allergies    FAMILY HISTORY  Family History   Problem Relation Age of Onset     Parkinsonism Father            SOCIAL HISTORY  Social History     Socioeconomic History     Marital status: Single     Spouse name: Not on file     Number of children: Not on file     Years of education: Not on file     Highest education level: Not on file   Occupational History     Not on file   Social Needs     Financial resource strain: Not on file     Food insecurity     Worry: Not on file     Inability: Not on file     Transportation needs     Medical: Not on file     Non-medical: Not on file   Tobacco Use     Smoking status: Former Smoker     Smokeless tobacco: Never Used   Substance and Sexual Activity     Alcohol use: Not Currently     Drug use: Not Currently     Sexual activity: Not Currently   Lifestyle     Physical activity     Days per week: Not on file     Minutes per session: Not on file     Stress: Not on file   Relationships     Social connections     Talks on phone: Not on file     Gets together: Not  "on file     Attends Jewish service: Not on file     Active member of club or organization: Not on file     Attends meetings of clubs or organizations: Not on file     Relationship status: Not on file     Intimate partner violence     Fear of current or ex partner: Not on file     Emotionally abused: Not on file     Physically abused: Not on file     Forced sexual activity: Not on file   Other Topics Concern     Parent/sibling w/ CABG, MI or angioplasty before 65F 55M? Not Asked   Social History Narrative     Not on file       ROS:   Constitutional: No fever, chills, or sweats. No weight gain/loss   ENT: No visual disturbance, ear ache, epistaxis, sore throat  Allergies/Immunologic: Negative  Respiratory: No cough, hemoptysia  Cardiovascular: As per HPI  GI: No nausea, vomiting, hematemesis, melena, or hematochezia  : No urinary frequency, dysuria, or hematuria  Integument: Negative  Psychiatric: Negative  Neuro: Negative  Endocrinology: Negative   Musculoskeletal: Negative  Vascular: No walking impairment, claudication, ischemic rest pain or nonhealing wounds    EXAM:  /67   Pulse 98   Ht 1.727 m (5' 8\")   Wt 108.4 kg (239 lb)   SpO2 98%   BMI 36.34 kg/m    Mildly agitated today  RRR, no mrg  LUNGS CTAB  ABD soft, nd  EXT: no edema     Labs:  LIPID RESULTS:  Lab Results   Component Value Date    CHOL 170 06/12/2020    HDL 25 (L) 06/12/2020    LDL 94 06/12/2020    TRIG 255 (H) 06/12/2020    NHDL 145 (H) 06/12/2020       LIVER ENZYME RESULTS:  Lab Results   Component Value Date    AST 27 06/25/2020    ALT 45 06/25/2020       CBC RESULTS:  Lab Results   Component Value Date    WBC 11.5 (H) 06/25/2020    RBC 3.60 (L) 06/25/2020    HGB 10.6 (L) 06/25/2020    HCT 34.7 (L) 06/25/2020    MCV 96 06/25/2020    MCH 29.4 06/25/2020    MCHC 30.5 (L) 06/25/2020    RDW 14.3 06/25/2020     06/25/2020       BMP RESULTS:  Lab Results   Component Value Date     06/25/2020    POTASSIUM 3.8 06/25/2020    " CHLORIDE 102 06/25/2020    CO2 24 06/25/2020    ANIONGAP 13 06/25/2020    GLC 98 06/25/2020    BUN 29 06/25/2020    CR 0.76 06/25/2020    GFRESTIMATED >90 06/25/2020    GFRESTBLACK >90 06/25/2020    TEN 9.7 06/25/2020        A1C RESULTS:  Lab Results   Component Value Date    A1C 5.2 05/25/2020       Thank you for allowing me to participate in the care of your patient.      Sincerely,     Bibi Marcus MD     [facility]    cc:   Cachorro Morales MD  [address] [place] [id number]

## 2021-02-19 NOTE — PATIENT INSTRUCTIONS
1. Decrease metoprolol to 12.5 mg twice a day   2. Cardiac rehab referral   3. Echocardiogram   4. Follow up with Dr. Marcus in 3 months

## 2021-02-19 NOTE — PROGRESS NOTES
Clinic Care Coordination Contact    Follow Up Progress Note      Assessment:    CC RN called and spoke with José Miguel, director at patient's [facility] group home; she updated that patient is settling in very well.  He has home care PT/OT coming out to work with him regularly.  He will have a Cardiology appointment today.  At this time, José Miguel denied any questions, concerns, or need for additional CC RN support.    CC RN called and spoke with patient's father who echoed the update that he feels patient is doing really well at this time.  Patient's father confirmed patient is receiving the cares and supports he needs.  Patient's father denied need for additional CC RN support at this time.    Goals addressed this encounter:   Goals Addressed                 This Visit's Progress      COMPLETED: 1. Psychosocial   100%     Goal Statement: Patient will have adequate supports and resources in place to ensure a smooth transition from TCU to group home.  Date Goal set: 1/20/21  Barriers: patient unable to advocate for self, high level of needs  Strengths: parents engaged and strong advocates  Date to Achieve By: 3/31/21  Patient/caregiver expressed understanding of goal: Yes, parents stated understanding    Action steps to achieve this goal:  1. Parents will continue working with TCU staff to identify patient needs and arrange for appropriate supports and resources prior to TCU discharge.  2. Parents will notify Care Coordination with updates, barriers, or gaps in care.  3. Care Coordination will collaborate with TCU and group home staff as indicated.        Intervention/Education provided during outreach:    CC RN reviewed patient's chart with [facility]; he is receiving PT, OT, and ST support.    CC RN reiterated to group home and parents that CC RN is available should any future needs arise; they stated understanding and agreed to call if needed.    Plan:     Patient's goal has been met and no  additional patient needs identified at this time, therefore patient will be Graduated from Care Coordination.    CC RN will make no further scheduled patient outreaches at this time but will remain available should any patient needs arise.    Frandy Doe, RN  Clinic Care Coordinator  [facility]  Ph: [phone]

## 2021-03-02 ENCOUNTER — TELEPHONE (OUTPATIENT)
Dept: INTERNAL MEDICINE | Facility: CLINIC | Age: 41
End: 2021-03-02

## 2021-03-02 ENCOUNTER — VIRTUAL VISIT (OUTPATIENT)
Dept: INTERNAL MEDICINE | Facility: CLINIC | Age: 41
End: 2021-03-02
Payer: COMMERCIAL

## 2021-03-02 DIAGNOSIS — L98.9 SKIN DISORDER: Primary | ICD-10-CM

## 2021-03-02 DIAGNOSIS — G93.1 ANOXIC BRAIN DAMAGE (H): Primary | ICD-10-CM

## 2021-03-02 PROCEDURE — 99214 OFFICE O/P EST MOD 30 MIN: CPT | Mod: 95 | Performed by: INTERNAL MEDICINE

## 2021-03-02 RX ORDER — CEPHALEXIN 500 MG/1
500 CAPSULE ORAL 2 TIMES DAILY
Qty: 21 CAPSULE | Refills: 0 | Status: CANCELLED | OUTPATIENT
Start: 2021-03-02

## 2021-03-02 RX ORDER — SULFAMETHOXAZOLE/TRIMETHOPRIM 800-160 MG
1 TABLET ORAL 2 TIMES DAILY
Qty: 14 TABLET | Refills: 0 | Status: SHIPPED | OUTPATIENT
Start: 2021-03-02 | End: 2021-04-26

## 2021-03-02 NOTE — TELEPHONE ENCOUNTER
Nurse (Chioma) with pt's new group home:  [facility], moved there on Feb 4th, [address] [facility], is calling about a large boil below his waistline, above his groin area.   Just noticed lump yesterday.  It is Red, warm, hard, pussy, draining, looks like a round marble, but feels bigger when you touch it.  Pt is non-verbal, no fever, but due to TBI temp has not been as well controlled as normal.  Other group home residents have had MRSA in the past.  And there is a possible case of MRSA now. The resident has appt today with their doctor to be evaluated & tested for MRSA.     Video visit scheduled this afternoon at 4:20p with Dr. Morales.   Call group [place] mail line @ [phone].  Other option is group home , José Miguel [phone].  Pt's father (guardian) may want to be conferenced in visit as well? [phone].

## 2021-03-02 NOTE — PROGRESS NOTES
Scot is a 40 year old who is being evaluated via a billable video visit.      How would you like to obtain your AVS? MyChart  Should be resent by: Text to cell phone: [phone]  Will anyone else be joining your video visit? No      Video Start Time: 405PM but unable to connect via [place] used Doximity    Assessment & Plan     Skin disorder  Discussed with family and nursing staff.  Suggest culturing wound and then starting Bactrim DS 1 tablet twice daily x7 days to be crushed and inserted through the G-tube with flushing of G-tube afterwards.  Culture results pending.  Discussed with the pharmacy staff also in regards to Bactrim DS use with current medications.  Patient is already on medication that places at slightly higher risk for QT prolongation but Bactrim use appears to be short term thus risks considered low relative to potential benefits.  Discussed with nursing staff and current medications all needed thus cannot hold a such.  - sulfamethoxazole-trimethoprim (BACTRIM DS) 800-160 MG tablet; Take 1 tablet by mouth 2 times daily Please crush tablets and dose per G-tube and flush G-tube after dosing  - Wound Culture Aerobic Bacterial GICH (FUTURE); Future  - Gram stain GICH (Future); Future     See Patient Instructions    Return in about 1 week (around 3/9/2021) for if symptoms recur or worsen.    Cachorro Morales MD  [facility]   Scot is a 40 year old who presents for the following health issues  accompanied by his group home staff (José Miguel):    HPI     Noted h/o Hypoxic Brain Injury secondary to VF arrest with persistent moderate-severe encephalopathy, TBI, nonverbal, respiratory failure requiring trach placement, dysphagia (on tube feeding), cardiac arrest (s/p successful aspiration thrombectomy and PCI w/ NAZIA of proximal and mid LAD)     Patient has had a few ED visits for multiple reasons since November admission - notes in Care Everywhere     Hospitalized  11/11/20-11/27/20 at [facility] for increased agitation: infectious source negative. Neurology consulted and medications switched around. 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 Red, warn, hard, draining puss and blood today    Intensity: moderate  Progression of Symptoms:  improving  Accompanying Signs & Symptoms: Possible MRSA in group home currently   Therapies tried and outcome: Abx ointment         Review of Systems   ENT/MOUTH: NEGATIVE for ear, mouth and throat problems with baseline vegetative state  RESP: NEGATIVE for significant cough or SOB  CV: NEGATIVE for chest pain, palpitations or peripheral edema  GI: NEGATIVE for nausea, abdominal pain, heartburn, or change in bowel habits  : NEGATIVE for frequency, dysuria, or hematuria  NEURO: NEGATIVE for weakness, dizziness or paresthesias  HEME: NEGATIVE for bleeding problems      Objective       Vitals:  No vitals were obtained today due to virtual visit.    Physical Exam   GENERAL: alert, nonverbal and no distress  EYES: Eyes grossly normal to inspection.  No discharge or erythema, or obvious scleral/conjunctival abnormalities.  RESP: No audible wheeze, cough, or visible cyanosis.  No visible retractions or increased work of breathing.    SKIN: There is a dime size open skin lesion noted to the left lower quadrant that appears to be slightly erythematous.  Per group home staff the area slightly more prominent under the skin but does not appear fluctuant nor profoundly tender.  Noted baseline P. Vegetative state  PSYCH: Mentation appears abnormal as nonverbal      Creatinine   Date Value Ref Range Status   06/25/2020 0.76 0.66 - 1.25 mg/dL Final       Video-Visit Details    Type of service:  Video Visit    Video End  Time:424PM    Originating Location (pt. Location): Home    Distant Location (provider location):  [facility]     Platform used for Video Visit: Nuji

## 2021-03-03 ENCOUNTER — TELEPHONE (OUTPATIENT)
Dept: INTERNAL MEDICINE | Facility: CLINIC | Age: 41
End: 2021-03-03

## 2021-03-03 ENCOUNTER — VIRTUAL VISIT (OUTPATIENT)
Dept: PSYCHIATRY | Facility: CLINIC | Age: 41
End: 2021-03-03
Payer: COMMERCIAL

## 2021-03-03 DIAGNOSIS — F34.81 DISRUPTIVE MOOD DYSREGULATION DISORDER (H): ICD-10-CM

## 2021-03-03 LAB
GRAM STN SPEC: ABNORMAL
SPECIMEN SOURCE: ABNORMAL

## 2021-03-03 PROCEDURE — 87070 CULTURE OTHR SPECIMN AEROBIC: CPT | Performed by: INTERNAL MEDICINE

## 2021-03-03 PROCEDURE — 87015 SPECIMEN INFECT AGNT CONCNTJ: CPT | Performed by: INTERNAL MEDICINE

## 2021-03-03 PROCEDURE — 87205 SMEAR GRAM STAIN: CPT | Performed by: INTERNAL MEDICINE

## 2021-03-03 PROCEDURE — 87077 CULTURE AEROBIC IDENTIFY: CPT | Performed by: INTERNAL MEDICINE

## 2021-03-03 PROCEDURE — 87186 SC STD MICRODIL/AGAR DIL: CPT | Performed by: INTERNAL MEDICINE

## 2021-03-03 PROCEDURE — 99214 OFFICE O/P EST MOD 30 MIN: CPT | Mod: 95 | Performed by: NURSE PRACTITIONER

## 2021-03-03 RX ORDER — HALOPERIDOL 5 MG/1
TABLET ORAL
Qty: 60 TABLET | Refills: 1 | Status: SHIPPED | OUTPATIENT
Start: 2021-03-03 | End: 2021-03-18

## 2021-03-03 NOTE — TELEPHONE ENCOUNTER
Nutrition Education Scheduling Outreach #2:    Call to patient to schedule. Patient is on another video call and asked for a call back.    Mary Anne Helms  [facility]  Diabetes and Nutrition Scheduling

## 2021-03-03 NOTE — PROGRESS NOTES
"Scot is a 40 year old who is being evaluated via a billable video visit.      How would you like to obtain your AVS? MyChart  If the video visit is dropped, the invitation should be resent by: Text to cell phone: [phone]  Will anyone else be joining your video visit? No            Outpatient Psychiatric Progress Note    Name: Scot Bishop   : 1980                    Primary Care Provider: Cachorro Morales MD   Therapist: None     PHQ-9 scores:  No flowsheet data found.    CLEMENT-7 scores:  No flowsheet data found.    Patient Identification:    Patient is a 40 year old year old, single  White American male  who presents for return visit with me.  Patient is currently disabled. Patient attended the session with his parents , who they agreed to have interview with. Patient prefers to be called: \"Scot\".    Interim History:    I last saw Scot Bishop for outpatient psychiatry Consultation on 2021.     During that appointment, we met for the first time in the company of his parents at a group home where he resides.  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  As he has been recovering from this he has developed some agitation behaviors which then prompted providers to add Haldol and quetiapine to his schedule.  His parents feel that he may be overmedicated as he is having difficulties participating in PT and OT therapies.  I will start by slowly decreasing the Haldol and he will now take 5 mg twice daily with the plan to discontinue it in the future should he be able to tolerate this.  He will continue the quetiapine 50 mg 3 times daily, Paxil 20 mg daily, and trazodone 50 mg at bedtime.  He is taking medications and nutrition through a feeding tube.  Prior to that the incident occurring last May, Scot had not been receiving any type of mental health treatment. .     Current medications include: " acetaminophen (TYLENOL) 325 MG tablet, Take 2 tablets (650 mg) by mouth every 6 hours as needed for mild pain or fever (Patient taking differently: Take 650 mg by mouth every 6 hours as needed for mild pain or fever VIA PEG-Tube)  amantadine (SYMMETREL) 50 MG/5ML syrup, 100 mg by Per G Tube route every 12 hours VIA PEG-Tube  aspirin (ASA) 81 MG chewable tablet, 1 tablet (81 mg) by Per Feeding Tube route daily (Patient taking differently: 81 mg by Per Feeding Tube route daily Via PEG-tube)  atorvastatin (LIPITOR) 40 MG tablet, 40 mg by Per G Tube route At Bedtime Via PEG-tube  bacitracin 500 UNIT/GM OINT,   bacitracin-neomycin-polymyxin (NEOSPORIN) 400-5-5000 external ointment, Apply 1 Application topically 2 times daily   haloperidol (HALDOL) 5 MG tablet, Take 1 tablet (5 mg) by mouth 2 times daily Via PEG-tube  metoprolol tartrate (LOPRESSOR) 25 MG tablet, Take 0.5 tablets (12.5 mg) by mouth 2 times daily  PARoxetine (PAXIL) 20 MG tablet, 20 mg by Oral or Feeding Tube route every morning   QUEtiapine (SEROQUEL) 25 MG tablet, 50 mg by Per Feeding Tube route 3 times daily   sulfamethoxazole-trimethoprim (BACTRIM DS) 800-160 MG tablet, Take 1 tablet by mouth 2 times daily Please crush tablets and dose per G-tube and flush G-tube after dosing  traZODone (DESYREL) 50 MG tablet, 50 mg by Per G Tube route At Bedtime   Valproate Sodium (VALPROIC ACID) 250 MG/5ML, 250 mg by Enteral route 2 times daily   Emollient (AQUAPHOR ADVANCED THERAPY) OINT,   mineral oil-hydrophilic petrolatum (AQUAPHOR) external ointment, Apply topically 2 times daily   nystatin (MYCOSTATIN) 957202 UNIT/GM external cream, Apply topically 2 times daily  omeprazole (PRILOSEC) 2 mg/mL suspension, 40 mg by Per Feeding Tube route every morning (before breakfast)   ticagrelor (BRILINTA) 90 MG tablet, 90 mg by Nasogastric route 2 times daily    No current facility-administered medications on file prior to visit.        The [place] Prescription Monitoring  Program has been reviewed and there are no concerns about diversionary activity for controlled substances at this time.      I was able to review most recent Primary Care Provider, specialty provider, and therapy visit notes that I have access to.     Today, patient reports that he has been more alert and has been walking more.  He has been biting his shirts more.  He has been starting to taste foods. He has not been sleeping well at night.  Other times he will sleep well.  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  Sometimes he wants to bite.  He responds to the television by laughing.       has no past medical history on file.    Social history updates:    His son lives in an assisted living facility.  He has 2 children who live with their mother in his home.  His parents are supportive.    Substance use updates:    He is not drinking alcohol getting alcohol drinking alcohol  Tobacco use: No    Vital Signs:   There were no vitals taken for this visit.    Labs:    Most recent laboratory results reviewed and no new labs.     Review of Systems:  10 systems (general, cardiovascular, respiratory, eyes, ENT, endocrine, GI, , M/S, neurological) were reviewed. Most pertinent finding(s) is/are: Scot is wheelchair-bound, making random movements with his extremities, I was informed that he has an abscess on his abdomen. The remaining systems are all unremarkable.    Mental Status Examination:  Appearance:  awake, alert, mild distress and casually dressed  Attitude:  somewhat cooperative   Eye Contact:  looking around room  Gait and Station: Uses wheelchair  Psychomotor Behavior:  fidgeting  Oriented to:  Person  Attention Span and Concentration:  Poor  Speech:   mute  Mood:  anxious  Affect:  intensity is heightened  Associations:  no loose associations  Thought Process:  Unable to assess  Thought Content:  Unable to assess  Recent and Remote Memory:  Unable to assess Not formally assessed. No amnesia.  Fund of  Knowledge: low-normal  Insight:  limited  Judgment:  poor  Impulse Control:  poor    Suicide Risk Assessment:  Today Scot Bishop's parents and care team staff report that Scot has made no attempts to end his life. In addition, there are notable risk factors for self-harm, including single status, anxiety and comorbid medical condition of Brain injury. However, risk is mitigated by absence of past attempts and no family history of suicide. Therefore, based on all available evidence including the factors cited above, Scot Bishop does not appear to be at imminent risk for self-harm, does not meet criteria for a 72-hr hold, and therefore remains appropriate for ongoing outpatient level of care.  A thorough assessment of risk factors related to suicide and self-harm have been reviewed and are noted above. The patient convincingly denies suicidality on several occasions. Local community safety resources printed and reviewed for patient to use if needed. There was no deceit detected, and the patient presented in a manner that was believable.     DSM5 Diagnosis:  296.99 (F34.8) Disruptive Mood Dysregulation Disorder    Medical comorbidities include:   Patient Active Problem List    Diagnosis Date Noted     Brain hypoxic injury (H) 02/15/2021     Priority: Medium     Cardiac arrest (H) 05/17/2020     Priority: Medium       Assessment:    Scot Bishop remains mute and wheelchair-bound.  The family feels that he has increased alertness since decreasing the Haldol.  They would like it decreased further and I changed it to 2.5 mg in the morning and 5 mg in the evening.  He is experiencing some involuntary movements and that we will bear watching.  Quetiapine will continue at 50 mg 3 times daily.  Paxil will continue at 20 mg daily and trazodone will continue at 50 mg at bedtime.  Of note, all of his medications are given through a feeding tube..    Medication side effects and alternatives were reviewed. Health  promotion activities recommended and reviewed today. All questions addressed. Education and counseling completed regarding risks and benefits of medications and psychotherapy options.    Treatment Plan:      1.  Decrease Haldol to 2.5 mg in the morning and 5 mg in the evening  2.  Continue quetiapine 50 mg 3 times daily  3.  Continue Paxil 20 mg daily  4.  Continue trazodone 50 mg at bedtime  5.  All medications given through feeding tube       Continue all other medications as reviewed per electronic medical record today.     Safety plan reviewed. To the Emergency Department as needed or call after hours crisis line at [phone] or [phone]. [place] Crisis Text [place]. Text [place] to 354736 or Suicide LifeLine Chat: "[employer].org/chat/    To schedule individual or family therapy, call [facility] at [phone].    Schedule an appointment with me March 18 or sooner as needed. Call [facility] at [phone] to schedule.    Follow up with primary care provider as planned or for acute medical concerns.    Call the psychiatric nurse line with medication questions or concerns at [phone].    [employer] may be used to communicate with your provider, but this is not intended to be used for emergencies.    Crisis Resources:    National Suicide Prevention Lifeline: 325.113.1740 (TTY: 302.401.6365). Call anytime for help.  (www.suicidepreventionlifeline.org)  National [place] on Mental Illness (www.melvin.org): 951.245.2875 or 508-987-4183.   Mental Health Association (www.mentalhealth.org): 877.962.7601 or 448-037-1841.  [place] Crisis Text [place]: Text [place] to 362448  Suicide LifeLine Chat: "[employer].org/chat    Administrative Billing:   Time spent with patient was 30 minutes and greater than 50% of time or 20 minutes was spent in counseling and coordination of care regarding above diagnoses and treatment plan.    Patient Status:  Patient will continue to  be seen for ongoing consultation and stabilization.    Signed:   SONNY JeongP-BC   Psychiatry

## 2021-03-03 NOTE — PATIENT INSTRUCTIONS
1.  Decrease Haldol to 2.5 mg in the morning and 5 mg in the evening  2.  Continue quetiapine 50 mg 3 times daily  3.  Continue Paxil 20 mg daily  4.  Continue trazodone 50 mg at bedtime  5.  All medications given through feeding tube       Continue all other medications as reviewed per electronic medical record today.     Safety plan reviewed. To the Emergency Department as needed or call after hours crisis line at [phone] or [phone]. [place] Crisis Text [place]. Text [place] to 968210 or Suicide LifeLine Chat: suicideThe Library.org/chat/    To schedule individual or family therapy, call [facility] at [phone].    Schedule an appointment with me March 18 or sooner as needed. Call [facility] at [phone] to schedule.    Follow up with primary care provider as planned or for acute medical concerns.    Call the psychiatric nurse line with medication questions or concerns at [phone].    [employer] may be used to communicate with your provider, but this is not intended to be used for emergencies.    Crisis Resources:    National Suicide Prevention Lifeline: 870.670.6288 (TTY: 291.887.9407). Call anytime for help.  (www.suicidepreventionlifeline.org)  National [place]ise[place] on Mental Illness (www.melvin.org): 822.410.2901 or 746-330-2813.   Mental Health Association (www.mentalhealth.org): 328.268.1597 or 525-345-6434.  [place] Crisis [place]: Text [place] to 267352  Suicide LifeLine Chat: suicideThe Library.org/chat

## 2021-03-06 LAB
BACTERIA SPEC CULT: ABNORMAL
BACTERIA SPEC CULT: ABNORMAL
Lab: ABNORMAL
SPECIMEN SOURCE: ABNORMAL

## 2021-03-07 ENCOUNTER — HEALTH MAINTENANCE LETTER (OUTPATIENT)
Age: 41
End: 2021-03-07

## 2021-03-09 ENCOUNTER — COMMUNICATION - HEALTHEAST (OUTPATIENT)
Dept: SCHEDULING | Facility: CLINIC | Age: 41
End: 2021-03-09

## 2021-03-10 ENCOUNTER — TRANSFERRED RECORDS (OUTPATIENT)
Dept: HEALTH INFORMATION MANAGEMENT | Facility: CLINIC | Age: 41
End: 2021-03-10

## 2021-03-10 ENCOUNTER — RECORDS - HEALTHEAST (OUTPATIENT)
Dept: ADMINISTRATIVE | Facility: OTHER | Age: 41
End: 2021-03-10

## 2021-03-10 ENCOUNTER — HOSPITAL ENCOUNTER (OUTPATIENT)
Dept: RADIOLOGY | Facility: CLINIC | Age: 41
Discharge: HOME | End: 2021-03-10
Admitting: RADIOLOGY

## 2021-03-10 ENCOUNTER — TELEPHONE (OUTPATIENT)
Dept: INTERNAL MEDICINE | Facility: CLINIC | Age: 41
End: 2021-03-10

## 2021-03-10 DIAGNOSIS — S06.9XAA TBI (TRAUMATIC BRAIN INJURY) (H): ICD-10-CM

## 2021-03-10 DIAGNOSIS — G93.1 BRAIN HYPOXIC INJURY (H): Primary | ICD-10-CM

## 2021-03-10 NOTE — TELEPHONE ENCOUNTER
Call from José Miguel from [facility]. She states they need Dr. Morales to send to a referral to [facility] to get Scot's g-tube replaced. They have appointment at 12:30 PM today, because g-tube is leaking.They request it ASA. Fax: [phone]    Huddled with LARRY Vale to assist.

## 2021-03-12 ENCOUNTER — TELEPHONE (OUTPATIENT)
Dept: INTERNAL MEDICINE | Facility: CLINIC | Age: 41
End: 2021-03-12

## 2021-03-12 DIAGNOSIS — G93.1 ANOXIC BRAIN DAMAGE (H): Primary | ICD-10-CM

## 2021-03-12 NOTE — TELEPHONE ENCOUNTER
"Called José Miguel. Unsure if order correct. Need to contact current \"swallow person\", guessing speech therapist? José Miguel Gave me number for Caleb [phone]. Called Caleb, he is speech therapist. Needs order for outpatient video swallow with radiologist with speech therapy so speech therapy gets ordered as well. Needs radiologist and speech therapist. Caleb discharged patient today from speech homecare because got diet to where he can eat puree but he is not really conscious- in a coma, some sort of level 2- has reflux swallow. If wants to go beyond puree needs a video swallow. Does not think patient is consciously enjoying it. Pended new orders. Call back group home when done.   "

## 2021-03-12 NOTE — TELEPHONE ENCOUNTER
Call from José Miguel with [facility]. She is calling to request video swallow study with speech therapy. States he is currently working with speech therapy and they are requesting this. Routing to provider to review/advise.    When complete, can be faxed to:  [facility]   Fax: [phone]    Call José Miguel back when completed. Okay to leave detailed voicemail.

## 2021-03-16 NOTE — TELEPHONE ENCOUNTER
LM on José Miguel JONES. Order faxed to group home. Caleb francis as well.    Akanksha MILLERN, RN, PHN

## 2021-03-18 ENCOUNTER — VIRTUAL VISIT (OUTPATIENT)
Dept: PSYCHIATRY | Facility: CLINIC | Age: 41
End: 2021-03-18
Payer: COMMERCIAL

## 2021-03-18 DIAGNOSIS — F34.81 DISRUPTIVE MOOD DYSREGULATION DISORDER (H): ICD-10-CM

## 2021-03-18 PROCEDURE — 99214 OFFICE O/P EST MOD 30 MIN: CPT | Mod: 95 | Performed by: NURSE PRACTITIONER

## 2021-03-18 RX ORDER — HALOPERIDOL 5 MG/1
TABLET ORAL
Qty: 30 TABLET | Refills: 1 | Status: SHIPPED | OUTPATIENT
Start: 2021-03-18 | End: 2021-04-01

## 2021-03-18 RX ORDER — QUETIAPINE FUMARATE 50 MG/1
TABLET, FILM COATED ORAL
Qty: 150 TABLET | Refills: 1 | Status: SHIPPED | OUTPATIENT
Start: 2021-03-18 | End: 2021-04-29

## 2021-03-18 NOTE — PATIENT INSTRUCTIONS
1.  Decrease Haldol to 2.5 mg twice daily  2.  Increase quetiapine to 100 mg at 8 AM, 50 mg at 2 PM, and 100 mg at 8 PM   3.  Continue Paxil 20 mg daily  4.  Continue trazodone 50 mg at bedtime  5.  All medications given through feeding tube       Continue all other medications as reviewed per electronic medical record today.     Safety plan reviewed. To the Emergency Department as needed or call after hours crisis line at [phone] or [phone]. [place] Crisis [place]. Text [place] to 656747 or Suicide LifeLine Chat: [employer].org/chat/    To schedule individual or family therapy, call [facility] at [phone].    Schedule an appointment with me April 1 or sooner as needed. Call [facility] at [phone] to schedule.    Follow up with primary care provider as planned or for acute medical concerns.    Call the psychiatric nurse line with medication questions or concerns at [phone].    [employer] may be used to communicate with your provider, but this is not intended to be used for emergencies.    Crisis Resources:    National Suicide Prevention Lifeline: 326.266.2093 (TTY: 241.442.9617). Call anytime for help.  (www.suicidepreventionlifeline.org)  National Union on Mental Illness (www.melvin.org): 293.311.4828 or 930-830-6866.   Mental Health Association (www.mentalhealth.org): 772.743.8425 or 693-331-4741.  [place] Crisis [place]: Text [place] to 123868  Suicide LifeLine Chat: suicideGov-Savings.org/chat

## 2021-03-18 NOTE — TELEPHONE ENCOUNTER
Nutrition Education Scheduling Outreach #3:    Call to patient to schedule. Left message with phone number to call to schedule.    Mary Anne Helms  [facility]  Diabetes and Nutrition Scheduling

## 2021-03-18 NOTE — PROGRESS NOTES
"Scot is a 40 year old who is being evaluated via a billable video visit.      How would you like to obtain your AVS? MyChart  If the video visit is dropped, the invitation should be resent by: Send to e-mail at: [email or web address]  Will anyone else be joining your video visit? No            Outpatient Psychiatric Progress Note    Name: Scot Bishop   : 1980                    Primary Care Provider: Cachorro Morales MD   Therapist: None    PHQ-9 scores:  No flowsheet data found.    CLEMENT-7 scores:  No flowsheet data found.    Patient Identification:    Patient is a 40 year old year old, single  White American male  who presents for return visit with me.  Patient is currently disabled. Patient attended the session with His parents and care team staff , who they agreed to have interview with. Patient prefers to be called: \" Scot\".    Interim History:    I last saw Scot Bishop for outpatient psychiatry Return Visit on March 3, 2021.     During that appointment, he remained mute and wheelchair-bound.  The family feels that he has increased alertness since decreasing the Haldol.  They would like it decreased further and I changed it to 2.5 mg in the morning and 5 mg in the evening.  He was experiencing some involuntary movements.  Quetiapine will continue at 50 mg 3 times daily.  Paxil will continue at 20 mg daily and trazodone will continue at 50 mg at bedtime.  Of note, all of his medications are given through a feeding tube.. .     Current medications include: acetaminophen (TYLENOL) 325 MG tablet, Take 2 tablets (650 mg) by mouth every 6 hours as needed for mild pain or fever (Patient taking differently: Take 650 mg by mouth every 6 hours as needed for mild pain or fever VIA PEG-Tube)  amantadine (SYMMETREL) 50 MG/5ML syrup, 100 mg by Per G Tube route every 12 hours VIA PEG-Tube  aspirin (ASA) 81 MG chewable tablet, 1 tablet (81 mg) by Per Feeding Tube route daily (Patient taking differently: 81 mg by " Per Feeding Tube route daily Via PEG-tube)  atorvastatin (LIPITOR) 40 MG tablet, 40 mg by Per G Tube route At Bedtime Via PEG-tube  bacitracin 500 UNIT/GM OINT,   bacitracin-neomycin-polymyxin (NEOSPORIN) 400-5-5000 external ointment, Apply 1 Application topically 2 times daily   Emollient (AQUAPHOR ADVANCED THERAPY) OINT,   haloperidol (HALDOL) 5 MG tablet, 2.5 mg in the morning and 5 mg in the evening via peg tube  metoprolol tartrate (LOPRESSOR) 25 MG tablet, Take 0.5 tablets (12.5 mg) by mouth 2 times daily  mineral oil-hydrophilic petrolatum (AQUAPHOR) external ointment, Apply topically 2 times daily   nystatin (MYCOSTATIN) 057432 UNIT/GM external cream, Apply topically 2 times daily  omeprazole (PRILOSEC) 2 mg/mL suspension, 40 mg by Per Feeding Tube route every morning (before breakfast)   PARoxetine (PAXIL) 20 MG tablet, 20 mg by Oral or Feeding Tube route every morning   QUEtiapine (SEROQUEL) 25 MG tablet, 50 mg by Per Feeding Tube route 3 times daily   sulfamethoxazole-trimethoprim (BACTRIM DS) 800-160 MG tablet, Take 1 tablet by mouth 2 times daily Please crush tablets and dose per G-tube and flush G-tube after dosing  ticagrelor (BRILINTA) 90 MG tablet, 90 mg by Nasogastric route 2 times daily  traZODone (DESYREL) 50 MG tablet, 50 mg by Per G Tube route At Bedtime   Valproate Sodium (VALPROIC ACID) 250 MG/5ML, 250 mg by Enteral route 2 times daily     No current facility-administered medications on file prior to visit.        The [place] Prescription Monitoring Program has been reviewed and there are no concerns about diversionary activity for controlled substances at this time.      I was able to review most recent Primary Care Provider, specialty provider, and therapy visit notes that I have access to.     Today, patient reports that he has been more combative at least one time a day.   He has been participating in therapies.    He has had episodes of crying.  He tries to pull out the G tube.  He has  MRSA.  Staff find it difficult to redirect him at times.   Sometimes he does not sleep through the night.     has no past medical history on file.    Social history updates:    Scot continues to reside in an assisted living facility    Substance use updates:    He is sober  Tobacco use: No    Vital Signs:   There were no vitals taken for this visit.    Labs:    Most recent laboratory results reviewed and no new labs.     Review of Systems:  10 systems (general, cardiovascular, respiratory, eyes, ENT, endocrine, GI, , M/S, neurological) were reviewed. Most pertinent finding(s) is/are: wheelchair bound, incontinent, nonverbal, all medications through a  G tube. The remaining systems are all unremarkable.    Mental Status Examination:  Appearance:  distracted and casually dressed  Attitude:  mostly controlled   Eye Contact:  looking around room  Gait and Station: No dizziness or falls and Uses wheelchair  Psychomotor Behavior:  fidgeting  Oriented to:  person  Attention Span and Concentration:  Poor  Speech:   mute  Mood:  anxious  Affect:  reactive  Associations:  unable to assess  Thought Process:  concrete  Thought Content:  no self harm attempts  Recent and Remote Memory:  unable to assess Not formally assessed. No amnesia.  Fund of Knowledge: concrete  Insight:  unable to assess  Judgment:  poor  Impulse Control:  poor    Suicide Risk Assessment:  Today Scot Bishop's parents and care team staff report no self harm attempt. In addition, there are notable risk factors for self-harm, including single status, comorbid medical condition of brain anoxia complications and mood change. However, risk is mitigated by sobriety. Therefore, based on all available evidence including the factors cited above, Scot Bishop does not appear to be at imminent risk for self-harm, does not meet criteria for a 72-hr hold, and therefore remains appropriate for ongoing outpatient level of care.  A thorough assessment of risk  factors related to suicide and self-harm have been reviewed and are noted above. The patient convincingly denies suicidality on several occasions. Local community safety resources printed and reviewed for patient to use if needed. There was no deceit detected, and the patient presented in a manner that was believable.     DSM5 Diagnosis:  296.99 (F34.8) Disruptive Mood Dysregulation Disorder    Medical comorbidities include:   Patient Active Problem List    Diagnosis Date Noted     Brain hypoxic injury (H) 02/15/2021     Priority: Medium     Cardiac arrest (H) 05/17/2020     Priority: Medium       Assessment:    Scot Bishop  Is reported to engage in an increase in negative behaviors and aggression.  His father would like Scot to stop the Haldol eventually and the taper will continue.  The quetiapine dose will increase to help Scot decrease impulsive behaviors that have potential to harm care team staff.  Paxil and Trazodone will continue at their current doses.      Medication side effects and alternatives were reviewed. Health promotion activities recommended and reviewed today. All questions addressed. Education and counseling completed regarding risks and benefits of medications and psychotherapy options.    Treatment Plan:      1.  Decrease Haldol to 2.5 mg twice daily  2.  Increase quetiapine to 100 mg at 8 AM, 50 mg at 2 PM, and 100 mg at 8 PM   3.  Continue Paxil 20 mg daily  4.  Continue trazodone 50 mg at bedtime  5.  All medications given through feeding tube       Continue all other medications as reviewed per electronic medical record today.     Safety plan reviewed. To the Emergency Department as needed or call after hours crisis line at [phone] or [phone]. [place] Crisis Text [place]. Text [place] to 170711 or Suicide LifeLine Chat: suicidepreventionlifeline.org/chat/    To schedule individual or family therapy, call [facility] at [phone].    Schedule an appointment with  me April 1 or sooner as needed. Call [facility] at [phone] to schedule.    Follow up with primary care provider as planned or for acute medical concerns.    Call the psychiatric nurse line with medication questions or concerns at [phone].    MyChart may be used to communicate with your provider, but this is not intended to be used for emergencies.    Crisis Resources:    National Suicide Prevention Lifeline: 223.814.9076 (TTY: 438.406.1762). Call anytime for help.  (www.suicidepreventionlifeline.org)  National Brick on Mental Illness (www.melvin.org): 405.514.2876 or 754-711-6915.   Mental Health Association (www.mentalhealth.org): 558.444.6206 or 519-817-0301.  [place] Crisis [place]: Text [place] to 300538  Suicide LifeLine Chat: suicideRED - Recycled Electronics Distributorsline.org/chat    Administrative Billing:   Time spent with patient was 30 minutes and greater than 50% of time or 20 minutes was spent in counseling and coordination of care regarding above diagnoses and treatment plan.    Patient Status:  Patient will continue to be seen for ongoing consultation and stabilization.    Signed:   LASHAWN Jeong-BC   Psychiatry

## 2021-03-19 ENCOUNTER — HOSPITAL ENCOUNTER (OUTPATIENT)
Dept: CARDIOLOGY | Facility: CLINIC | Age: 41
Discharge: HOME | End: 2021-03-19
Attending: INTERNAL MEDICINE | Admitting: INTERNAL MEDICINE
Payer: COMMERCIAL

## 2021-03-19 DIAGNOSIS — I50.21 ACUTE SYSTOLIC HEART FAILURE (H): ICD-10-CM

## 2021-03-19 DIAGNOSIS — I25.10 CORONARY ARTERY DISEASE DUE TO LIPID RICH PLAQUE: ICD-10-CM

## 2021-03-19 DIAGNOSIS — I25.83 CORONARY ARTERY DISEASE DUE TO LIPID RICH PLAQUE: ICD-10-CM

## 2021-03-19 DIAGNOSIS — I46.9 CARDIAC ARREST (H): ICD-10-CM

## 2021-03-19 PROCEDURE — 93308 TTE F-UP OR LMTD: CPT | Mod: 26 | Performed by: INTERNAL MEDICINE

## 2021-03-19 PROCEDURE — 93325 DOPPLER ECHO COLOR FLOW MAPG: CPT | Mod: 26 | Performed by: INTERNAL MEDICINE

## 2021-03-19 PROCEDURE — 93321 DOPPLER ECHO F-UP/LMTD STD: CPT | Mod: 26 | Performed by: INTERNAL MEDICINE

## 2021-03-19 PROCEDURE — 93325 DOPPLER ECHO COLOR FLOW MAPG: CPT

## 2021-03-21 DIAGNOSIS — Z53.9 DIAGNOSIS NOT YET DEFINED: Primary | ICD-10-CM

## 2021-03-21 PROCEDURE — G0180 MD CERTIFICATION HHA PATIENT: HCPCS | Performed by: INTERNAL MEDICINE

## 2021-03-22 ENCOUNTER — TELEPHONE (OUTPATIENT)
Dept: CARDIOLOGY | Facility: CLINIC | Age: 41
End: 2021-03-22

## 2021-03-22 NOTE — TELEPHONE ENCOUNTER
Echocardiogram results noted 3/19/21:    Interpretation Summary     Severely decreased left ventricular systolic function  The visual ejection fraction is estimated at 30-35%. Traced 32% by Simpon's  Biplane.  Mid-apical segment akinesis.  The right ventricle is normal in structure, function and size.  No significant valve dysfunction.  The inferior vena cava is normal.  There is no pericardial effusion.     On direct comparison to study dated 6/10/2020, there is no significant change.    Will route to Dr. Marcus for review.

## 2021-03-23 NOTE — TELEPHONE ENCOUNTER
RN released results and updated the patient via [employer].     Dr Saroj Marcus's recommendations    Unchanged. I believe I read this report. You can release the results to the patient. He is s/p known massive MI and this is likely just residual infarct that may not change significantly. I can discuss with his family at [facility].   Dr. Marcus

## 2021-03-24 DIAGNOSIS — G93.1 ANOXIC BRAIN DAMAGE (H): Primary | ICD-10-CM

## 2021-03-24 DIAGNOSIS — L98.9 SKIN DISORDER: ICD-10-CM

## 2021-03-25 ENCOUNTER — TELEPHONE (OUTPATIENT)
Dept: INTERNAL MEDICINE | Facility: CLINIC | Age: 41
End: 2021-03-25

## 2021-03-25 RX ORDER — GINSENG 100 MG
CAPSULE ORAL
Qty: 15 G | Refills: 1 | Status: SHIPPED | OUTPATIENT
Start: 2021-03-25 | End: 2022-01-17

## 2021-03-25 RX ORDER — NYSTATIN 100000 U/G
CREAM TOPICAL 2 TIMES DAILY
Qty: 15 G | Refills: 1 | Status: SHIPPED | OUTPATIENT
Start: 2021-03-25 | End: 2022-07-15

## 2021-03-25 RX ORDER — NYSTATIN 100000 U/G
CREAM TOPICAL 2 TIMES DAILY
Qty: 15 G | Refills: 1 | Status: SHIPPED | OUTPATIENT
Start: 2021-03-25 | End: 2021-03-25

## 2021-03-25 RX ORDER — GINSENG 100 MG
CAPSULE ORAL
Qty: 15 G | Refills: 1 | Status: SHIPPED | OUTPATIENT
Start: 2021-03-25 | End: 2021-03-25

## 2021-03-25 NOTE — TELEPHONE ENCOUNTER
Pharmacy called,    4 ointments and creams should be as needed NOT scheduled per last  Recommendation     Triage spoke with group home, they are only using then PRN.    Please review, sig. And sign if appropriate.    Akanksha MILLERN, RN, PHN

## 2021-03-25 NOTE — TELEPHONE ENCOUNTER
Sigs completed.    Thanks!    Marguerite Cuevas, MSN, RN  Triage RN  [facility]

## 2021-03-25 NOTE — TELEPHONE ENCOUNTER
Request to continue homecare OT addressing hand splinting and mobility 1w1 1w2  Thank you, Ruby Silva OTRL

## 2021-03-25 NOTE — TELEPHONE ENCOUNTER
Routing refill request to provider for review/approval because:  Medication is reported/historical are you ok with taking them over?

## 2021-03-29 ENCOUNTER — HOSPITAL ENCOUNTER (OUTPATIENT)
Dept: GENERAL RADIOLOGY | Facility: CLINIC | Age: 41
Discharge: HOME | End: 2021-03-29
Attending: INTERNAL MEDICINE | Admitting: INTERNAL MEDICINE
Payer: COMMERCIAL

## 2021-03-29 ENCOUNTER — HOSPITAL ENCOUNTER (OUTPATIENT)
Dept: SPEECH THERAPY | Facility: CLINIC | Age: 41
Setting detail: THERAPIES SERIES
End: 2021-03-29
Attending: INTERNAL MEDICINE
Payer: COMMERCIAL

## 2021-03-29 DIAGNOSIS — G93.1 ANOXIC BRAIN DAMAGE (H): ICD-10-CM

## 2021-03-29 PROCEDURE — 92610 EVALUATE SWALLOWING FUNCTION: CPT | Mod: GN

## 2021-03-29 PROCEDURE — 92526 ORAL FUNCTION THERAPY: CPT | Mod: GN

## 2021-03-29 PROCEDURE — 74230 X-RAY XM SWLNG FUNCJ C+: CPT

## 2021-03-29 NOTE — PROGRESS NOTES
03/29/21 1100       Present No   General Information   Type Of Visit Initial   Referring Physician Cachorro Morales MD   Orders Evaluate And Treat   Medical Diagnosis Anoxic brain injury    Living Environment Group Home   General Observations Pt is alert and agreeable, he is nonverbal. Pt is accompanied by his parents who are very supportive.   Patient/family Goals To eat/drink and evenutally remove feeding tube    General Information Comments Pt suffered a heart attack in May of 2020 which resulted in an anoxic brain injury requiring trach and PEG placement. The trach was ultimately removed but pt has still been using his feeding tube as his primary source of nutrition. Per his parents, they have been completing pleasure feedings of great variety including liquids (by spoon), soft foods, and regular textured items. They report no indciations of dysphagia or aspiration during their trials. He was recieving SLP services for some time immediately after his initially hospitazliation. However, after requiring a hospitalization that involved being very mediciated he was not very complaint with therapies and it was discontinued. Pt has been demonstrating improved participation and therapies have been re initiated which resulted in HH SLP coming to pt's group home and recommending the video swallow be completed.    Abuse Screen (yes response referral indicated)   Feels Unsafe at Home or Work/School unable to answer (comment required)   Feels Threatened by Someone unable to answer (comment required)   Does Anyone Try to Keep You From Having Contact with Others or Doing Things Outside Your Home? unable to answer (comment required)   Physical Signs of Abuse Present no   VFSS Evaluation   VFSS Additional Documentation Yes   VFSS Eval: Radiology   Radiologist Dr. Hong    Views Taken left lateral   Physical Location of Procedure [facility]   VFSS Eval: Thin Liquid Texture Trial   Mode of  Presentation, Thin Liquid cup;spoon;fed by clinician   Preparatory Phase WFL   Oral Phase, Thin Liquid Premature pharyngeal entry;WFL   Pharyngeal Phase, Thin Liquid Delayed swallow reflex   Rosenbek's Penetration Aspiration Scale: Thin Liquid Trial Results 2 - contrast enters airway, remains above the vocal cords, no residue remains (penetration)   Diagnostic Statement One episode of transient penetration, no additional instances of penetration and no aspiration. Minimal amounts of pharyngeal residue. Liquids were consumed by teaspoon and cup sip, straw was attempted, however pt bites on the straw and is able not able to suck.   VFSS Eval: Puree Solid Texture Trial   Mode of Presentation, Puree spoon;fed by clinician   Preparatory Phase WFL   Oral Phase, Puree WFL   Pharyngeal Phase, Puree WFL   Rosenbek's Penetration Aspiration Scale: Puree Food Trial Results 1 - no aspiration, contrast does not enter airway   Diagnostic Statement Slightly slow but functional manipulation. No penetration, aspiration or significant amounts of pharyngeal resisdue    VFSS Eval: Solid Food Texture Trial   Mode of Presentation, Solid fed by clinician   Preparatory Phase WFL  (Slightly slow manipulation )   Oral Phase, Solid WFL  (Slightly slow but WFL)   Pharyngeal Phase, Solid WFL   Rosenbek's Penetration Aspiration Scale: Solid Food Trial Results 1 - no aspiration, contrast does not enter airway   Diagnostic Statement Slightly slow manipulation, bolus formation, and posterior propulsion. No penetration, aspiration, or significant pharyngeal residue    Swallow Compensations   Swallow Compensations Pacing   Results No difficulties noted   Educational Assessment   Barriers to Learning Cognitive   Esophageal Phase of Swallow   Patient reports or presents with symptoms of esophageal dysphagia No   General Therapy Interventions   Planned Therapy Interventions Dysphagia Treatment   Dysphagia treatment Instruction of safe swallow strategies    Swallow Eval: Clinical Impressions   Skilled Criteria for Therapy Intervention Skilled criteria met.  Treatment indicated.   Dysphagia Outcome Severity Scale (ENRIQUETA) Level 6 - ENRIQUETA   Treatment Diagnosis Oropharyngeal swallow WFL w/ supervision and assistance d/t cognitive deficits   Diet texture recommendations Regular diet;Thin liquids   Recommended Feeding/Eating Techniques alternate between small bites and sips of food/liquid;check mouth frequently for oral residue/pocketing;maintain upright posture during/after eating for 30 mins;no straws;small sips/bites   Rehab Potential good, to achieve stated therapy goals   Therapy Frequency   (1x education session with HH follow up)   Anticipated Discharge Disposition home w/ home health   Risks and Benefits of Treatment have been explained. Yes   Patient, family and/or staff in agreement with Plan of Care Yes   Clinical Impression Comments Pt seen for video swallow evaluation. He was positioned upright in his wheel chair. Slightly reclined position in his to avoid him slipping down, however likely a realistic position for him to being eating in at home. Pt is fed t/o the evaluation. Thin liquids by teaspoon and cup sip were consumed with transient penetration x 1 and no additional episodes of penetration or aspiration. Pt also consumed 1 teaspoon of puree and 1 bite from a cracker with slow mastication, bolus formation and oral clearance but WFL given sufficent time. No penetration, aspiration or pharyngeal residue noted across solids either. Functional tongue base retraction, adequate hyoid excursion and epiglottic inversion. Premature spillage noted from oral cavity to pharynx - likely a combination of slightly reclined position and his cognitive status. Recommend a regular diet and thin liquids w/ supervision and assistance for PO. Pt should be positioned upright, take/given small bites/sips, eat/drink at a slow rate, caregiver should ensure his oral cavity is clear  before providing another bite/sip and after meals. Pt's oropharyngeal phases of swallow are WFL, his cognitive status is his greatest risk for aspiration therefore supervision is recommended. Education provided to family re: diet, strategies, and contiued SLP services. Pt to continue with HH SLP.    Swallow Goals   SLP Swallow Goals 1   Swallow Goal 1   Goal Identifier Caregiver education    Goal Description Caregivers will verbalize understanding of recommendations    Target Date 03/29/21   Date Met 03/29/21   Total Session Time   Total Evaluation Time 15

## 2021-03-29 NOTE — PLAN OF CARE
[unfilled]                                                                              [facility]      OUTPATIENT SPEECH LANGUAGE PATHOLOGY EVALUATION  PLAN OF TREATMENT FOR OUTPATIENT REHABILITATION  (COMPLETE FOR INITIAL CLAIMS ONLY)  Patient's Last Name, First Name, M.I.  YOB: 1980  Scot Bishop                           Provider's Name  [facility] Medical Record No.  [id number]                               Onset Date:    3/29/21 Start of Care Date:   3/29/21   Type:     ___PT   ___OT   _X_SLP Medical Diagnosis:         Anoxic brain injury   SLP Diagnosis:    Dysphagia Visits from SOC:  1   ________________________________________________________________  Plan of Treatment/Functional Goals    Planned Interventions:                    Goals: See Speech Language Pathology Goals [facility].    Therapy Frequency:  1x follow up session provided to caregivers after video swallow was completed.  Predicted Duration of Therapy Intervention:  Continue per  SLP recommendations  ________________________________________________________________________________    I CERTIFY THE NEED FOR THESE SERVICES FURNISHED UNDER        THIS PLAN OF TREATMENT AND WHILE UNDER MY CARE     (Physician co-signature of this document indicates review and certification of the therapy plan).                                 Initial Assessment        See Speech Language Pathology documentation in Epic electronic health record, evaluation dated

## 2021-03-30 ENCOUNTER — TELEPHONE (OUTPATIENT)
Dept: INTERNAL MEDICINE | Facility: CLINIC | Age: 41
End: 2021-03-30

## 2021-03-30 NOTE — TELEPHONE ENCOUNTER
José Miguel informed. Akanksha BYERS, RN, PHN          Catrachito Reyna RN   3/30/2021 11:42 AM CDT      VM left to José Miguel [facility] Director  [phone] to call back for results.   VM left to Caleb speech therapist [phone] to call back clinic for results if needed - 3/12/21 pt was discharged from therapy.    Akanksha Puckett, RN   3/30/2021  9:09 AM CDT      LM on José Miguel's VM ([phone]) Akanksha BYERS, RN, ARUNAN          Cachorro Morales MD   3/29/2021  3:05 PM CDT      Please inform nursing home site patient's video swallow with recommendations per speech pathology for feeding as noted:     Recommend a regular diet and thin liquids w/ supervision and assistance for PO. Pt should be positioned upright, take/given small bites/sips, eat/drink at a slow rate, caregiver should ensure his oral cavity is clear before providing another bite/sip and after meals. Pt's cognitive status is his greatest risk for aspiration therefore supervision is recommended

## 2021-03-30 NOTE — TELEPHONE ENCOUNTER
José Miguel informed of result notes;     José Miguel home coordinator wanted to know what to do with patient's feeding tube?     She would like to monitor patients overall intake right now and see how well he does before discontinuing tube, is that ok?    Ok to leave a detailed message on VM

## 2021-03-30 NOTE — TELEPHONE ENCOUNTER
I am okay with monitoring feeding tube for now to observe patient's oral intake.  Would consider calorie counts also.

## 2021-04-01 ENCOUNTER — VIRTUAL VISIT (OUTPATIENT)
Dept: PSYCHIATRY | Facility: CLINIC | Age: 41
End: 2021-04-01
Payer: COMMERCIAL

## 2021-04-01 DIAGNOSIS — F34.81 DISRUPTIVE MOOD DYSREGULATION DISORDER (H): ICD-10-CM

## 2021-04-01 PROCEDURE — 99214 OFFICE O/P EST MOD 30 MIN: CPT | Mod: 95 | Performed by: NURSE PRACTITIONER

## 2021-04-01 RX ORDER — HALOPERIDOL 5 MG/1
TABLET ORAL
Qty: 30 TABLET | Refills: 1 | Status: SHIPPED | OUTPATIENT
Start: 2021-04-01 | End: 2021-04-19 | Stop reason: ALTCHOICE

## 2021-04-01 NOTE — PATIENT INSTRUCTIONS
1.  Decrease Haldol to 2.5 mg  at 8 PM only  2.  Continue quetiapine 100 mg at 8 AM, 50 mg at 2 PM, and 100 mg at 8 PM   3.  Continue Paxil 20 mg daily  4.  Continue trazodone 50 mg at bedtime  5.  All medications given through feeding tube  6.  Should leg stiffness continue, consider adding benztropine at bedtime.      Continue all other medications as reviewed per electronic medical record today.     Safety plan reviewed. To the Emergency Department as needed or call after hours crisis line at [phone] or [phone]. [place] Crisis Text Line. Text [place] to 977036 or Suicide LifeLine Chat: Cerelink.org/chat/    To schedule individual or family therapy, call [facility] at [phone].    Schedule an appointment with me April 15 or sooner as needed. Call [facility] at [phone] to schedule.    Follow up with primary care provider as planned or for acute medical concerns.    Call the psychiatric nurse line with medication questions or concerns at [phone].    [employer] Apps may be used to communicate with your provider, but this is not intended to be used for emergencies.    Crisis Resources:    National Suicide Prevention Lifeline: 712.195.6861 (TTY: 643.677.2686). Call anytime for help.  (www.suicidepreventionlifeline.org)  National [place] on Mental Illness (www.melvin.org): 980.286.7802 or 809-750-3652.   Mental Health Association (www.mentalhealth.org): 413.454.6366 or 064-064-8379.  [place] Crisis Text Line: Text [place] to 175469  Suicide LifeLine Chat: Cerelink.org/chat

## 2021-04-01 NOTE — PROGRESS NOTES
"Scot is a 40 year old who is being evaluated via a billable video visit.      How would you like to obtain your AVS? MyChart  If the video visit is dropped, the invitation should be resent by: Text to cell phone: [phone]  Will anyone else be joining your video visit? No             Outpatient Psychiatric Progress Note    Name: Scot Bishop   : 1980                    Primary Care Provider: Cachorro Morales MD   Therapist: None    PHQ-9 scores:  No flowsheet data found.    CLEMENT-7 scores:  No flowsheet data found.    Patient Identification:    Patient is a 40 year old year old, single  White American male  who presents for return visit with me.  Patient is currently disabled. Patient attended the session with His parents , who they agreed to have interview with. Patient prefers to be called: \" Scot\".    Interim History:    I last saw Scot Bishop for outpatient psychiatry Return Visit on 2021.     During that appointment, he was reported to engage in an increase in negative behaviors and aggression.  His father would like Scot to stop the Haldol eventually and the taper will continue.  The quetiapine dose will increase to help Scot decrease impulsive behaviors that have potential to harm care team staff.  Paxil and Trazodone will continue at their current doses.   .     Current medications include: acetaminophen (TYLENOL) 325 MG tablet, Take 2 tablets (650 mg) by mouth every 6 hours as needed for mild pain or fever (Patient taking differently: Take 650 mg by mouth every 6 hours as needed for mild pain or fever VIA PEG-Tube)  amantadine (SYMMETREL) 50 MG/5ML syrup, 100 mg by Per G Tube route every 12 hours VIA PEG-Tube  aspirin (ASA) 81 MG chewable tablet, 1 tablet (81 mg) by Per Feeding Tube route daily (Patient taking differently: 81 mg by Per Feeding Tube route daily Via PEG-tube)  atorvastatin (LIPITOR) 40 MG tablet, 40 mg by Per G Tube route At Bedtime Via PEG-tube  bacitracin 500 " UNIT/GM OINT, Apply 1 Application topically 2 times daily  - Topical as needed  bacitracin-neomycin-polymyxin (NEOSPORIN) 400-5-5000 external ointment, Apply 1 Application topically 2 times daily Topical as needed  Emollient (AQUAPHOR ADVANCED THERAPY) OINT, Apply topically 2 times daily As needed.  metoprolol tartrate (LOPRESSOR) 25 MG tablet, Take 0.5 tablets (12.5 mg) by mouth 2 times daily  mineral oil-hydrophilic petrolatum (AQUAPHOR) external ointment, Apply topically 2 times daily   nystatin (MYCOSTATIN) 490632 UNIT/GM external cream, Apply topically 2 times daily As needed  omeprazole (PRILOSEC) 2 mg/mL suspension, 40 mg by Per Feeding Tube route every morning (before breakfast)   PARoxetine (PAXIL) 20 MG tablet, 20 mg by Oral or Feeding Tube route every morning   QUEtiapine (SEROQUEL) 50 MG tablet, Two Tablets (100 mg) at 8 AM, one tablet (50 mg) at 2 PM, and Two Tablets (100 mg) at 8 PM via peg tube  sulfamethoxazole-trimethoprim (BACTRIM DS) 800-160 MG tablet, Take 1 tablet by mouth 2 times daily Please crush tablets and dose per G-tube and flush G-tube after dosing  ticagrelor (BRILINTA) 90 MG tablet, 90 mg by Nasogastric route 2 times daily  traZODone (DESYREL) 50 MG tablet, 50 mg by Per G Tube route At Bedtime   Valproate Sodium (VALPROIC ACID) 250 MG/5ML, 250 mg by Enteral route 2 times daily     No current facility-administered medications on file prior to visit.        The [place] Prescription Monitoring Program has been reviewed and there are no concerns about diversionary activity for controlled substances at this time.      I was able to review most recent Primary Care Provider, specialty provider, and therapy visit notes that I have access to.     Today, Scot's parents report that he seems to continue to make improvements in his recovery.  I heard him speak out to me with voice sounds but could not understand what he was saying.  He seemed to have better eye contact with the video screen in  between episodes of napping.  Scot's parents tell me that he has now begun to eat solid food.  He has been able to walk short distances and his father is hoping to him prove on that distance that he is currently walking.  While he continues to have some irritable outbursts, it is not as frequent.  The parents are concerned about muscle tightness that occurs periodically in his legs making it difficult for him to walk.  Adjustments in the Haldol were made again today.     has no past medical history on file.    Social history updates:    Scot continues to live and assisted living facility requiring full care.  His children come to visit him infrequently.    Substance use updates:    He is sober  Tobacco use: No    Vital Signs:   There were no vitals taken for this visit.    Labs:    Most recent laboratory results reviewed and no new labs.     Review of Systems:  10 systems (general, cardiovascular, respiratory, eyes, ENT, endocrine, GI, , M/S, neurological) were reviewed. Most pertinent finding(s) is/are: Scot is nonverbal, walks with assistance, just started eating food, extremities relaxed. The remaining systems are all unremarkable.    Mental Status Examination:  Appearance:  well groomed and Fluctuating level of alertness  Attitude:  Calm   Eye Contact:  fair and better  Gait and Station: No dizziness or falls and Uses wheelchair  Psychomotor Behavior:  Sitting calmly in a chair  Oriented to:  Person  Attention Span and Concentration:  Poor  Speech:   mute  Mood:  better  Affect:  restricted range  Associations:  Unable to assess  Thought Process:  [place]  Thought Content:  Unable to assess  Recent and Remote Memory:  poor Not formally assessed. No amnesia.  Fund of Knowledge: delayed  Insight:  limited  Judgment:  limited  Impulse Control:  limited    Suicide Risk Assessment:  Today Scot Bishop's parents report that he has made no attempts to harm himself. In addition, there are notable risk factors  for self-harm, including anxiety, comorbid medical condition of Brain anoxia and mood change. However, risk is mitigated by absence of past attempts. Therefore, based on all available evidence including the factors cited above, Scot Bishop does not appear to be at imminent risk for self-harm, does not meet criteria for a 72-hr hold, and therefore remains appropriate for ongoing outpatient level of care.  A thorough assessment of risk factors related to suicide and self-harm have been reviewed and are noted above. The patient convincingly denies suicidality on several occasions. Local community safety resources printed and reviewed for patient to use if needed. There was no deceit detected, and the patient presented in a manner that was believable.     DSM5 Diagnosis:  296.99 (F34.8) Disruptive Mood Dysregulation Disorder    Medical comorbidities include:   Patient Active Problem List    Diagnosis Date Noted     Brain hypoxic injury (H) 02/15/2021     Priority: Medium     Cardiac arrest (H) 05/17/2020     Priority: Medium       Assessment:    Scot Bishop appears to have made some slight improvement in his demeanor.  While he continues to be reported to having some irritable outbursts, it seems to be occurring less often.  In looking at him today he was making no repetitive movements and was sitting quietly in a chair, sometimes looking straight at the computer screen.  He was able to make some verbal sounds and laughed a little bit.  Over the past 2 days he has been able to eat solid foods.  He also has been ambulating with assistance.  There is concern for leg stiffness that waxes and wanes.  I decreased the Haldol up to just taking 2.5 mg at bedtime.  Should the leg stiffness continue, I would consider adding benztropine at bedtime.  If he remains stable at his next visit the Haldol can be discontinued to see how he does.  Quetiapine will remain the same as well as the paroxetine.  Trazodone will continue  at 50 mg at bedtime..  The family feels that he is presenting as less overmedicated.    Medication side effects and alternatives were reviewed. Health promotion activities recommended and reviewed today. All questions addressed. Education and counseling completed regarding risks and benefits of medications and psychotherapy options.    Treatment Plan:     1.  Decrease Haldol to 2.5 mg  at 8 PM only  2.  Continue quetiapine 100 mg at 8 AM, 50 mg at 2 PM, and 100 mg at 8 PM   3.  Continue Paxil 20 mg daily  4.  Continue trazodone 50 mg at bedtime  5.  All medications given through feeding tube  6.  Should leg stiffness continue, consider adding benztropine at bedtime.      Continue all other medications as reviewed per electronic medical record today.     Safety plan reviewed. To the Emergency Department as needed or call after hours crisis line at [phone] or [phone]. [place] Crisis Text Line. Text [place] to 269870 or Suicide LifeLine Chat: Scoutmob.org/chat/    To schedule individual or family therapy, call [facility] at [phone].    Schedule an appointment with me April 15 or sooner as needed. Call [facility] at [phone] to schedule.    Follow up with primary care provider as planned or for acute medical concerns.    Call the psychiatric nurse line with medication questions or concerns at [phone].    NOWBOXt may be used to communicate with your provider, but this is not intended to be used for emergencies.    Crisis Resources:    National Suicide Prevention Lifeline: 924.249.5854 (TTY: 437.261.1161). Call anytime for help.  (www.suicidepreventionlifeline.org)  National [employer] on Mental Illness (www.melvin.org): 549.617.4812 or 865-617-2504.   Mental Health Association (www.mentalhealth.org): 948.954.4493 or 049-446-2171.  [place] Crisis Text Line: Text [place] to 477896  Suicide LifeLine Chat: suicideFixstars.org/chat    Administrative  Billing:   Time spent with patient was 30 minutes and greater than 50% of time or 20 minutes was spent in counseling and coordination of care regarding above diagnoses and treatment plan.    Patient Status:  Patient will continue to be seen for ongoing consultation and stabilization.    Signed:   LASHAWN Jeong-BC   Psychiatry

## 2021-04-05 DIAGNOSIS — I46.9 CARDIAC ARREST (H): ICD-10-CM

## 2021-04-05 RX ORDER — ATORVASTATIN CALCIUM 40 MG/1
40 TABLET, FILM COATED ORAL AT BEDTIME
Qty: 90 TABLET | Status: CANCELLED | OUTPATIENT
Start: 2021-04-05

## 2021-04-05 RX ORDER — ASPIRIN 81 MG/1
81 TABLET, CHEWABLE ORAL DAILY
Qty: 90 TABLET | Refills: 3 | Status: CANCELLED | OUTPATIENT
Start: 2021-04-05

## 2021-04-06 DIAGNOSIS — E78.5 HLD (HYPERLIPIDEMIA): Primary | ICD-10-CM

## 2021-04-06 DIAGNOSIS — I46.9 CARDIAC ARREST (H): ICD-10-CM

## 2021-04-06 RX ORDER — ATORVASTATIN CALCIUM 40 MG/1
40 TABLET, FILM COATED ORAL AT BEDTIME
Qty: 90 TABLET | Refills: 2 | Status: SHIPPED | OUTPATIENT
Start: 2021-04-06 | End: 2021-12-08

## 2021-04-06 RX ORDER — ASPIRIN 81 MG/1
81 TABLET, CHEWABLE ORAL DAILY
Qty: 90 TABLET | Refills: 2 | Status: SHIPPED | OUTPATIENT
Start: 2021-04-06 | End: 2021-12-08

## 2021-04-12 ENCOUNTER — TELEPHONE (OUTPATIENT)
Dept: PSYCHIATRY | Facility: CLINIC | Age: 41
End: 2021-04-12

## 2021-04-12 ENCOUNTER — TELEPHONE (OUTPATIENT)
Dept: INTERNAL MEDICINE | Facility: CLINIC | Age: 41
End: 2021-04-12

## 2021-04-12 DIAGNOSIS — F34.81 DISRUPTIVE MOOD DYSREGULATION DISORDER (H): Primary | ICD-10-CM

## 2021-04-12 NOTE — TELEPHONE ENCOUNTER
See provider's routing message on 4/12/21:    Elsa Rahman NP  Psych Rn - [facility] 37 minutes ago (3:20 PM)     Return to Haldol 2.5 mg twice daily per peg tube but inform father first - thank you.    Message text

## 2021-04-12 NOTE — TELEPHONE ENCOUNTER
Request continued homecare OT order 1w1 2w2 1w2 to address mobility, hand splint, AE  Thank you, Ruby Silva OTRL

## 2021-04-12 NOTE — TELEPHONE ENCOUNTER
Returned call to patient's home and spoke with José Miguel who reported the following:  He Has been more aggressive since his med was discontinued. OT was here and he was not complying and he did not want us provide any cares for him (she had to assist us in getting him ready). It wasn't safe transferring him out of the lift, he was biting the sling and he ripped some of the pampers off yesterday and was eating it we couldn't get it out of his mouth. He is in distress, he keeps scramming with his teeth closed and OT encouraged us to let his provider/Elsa know.    Encouraged her to use emergency help if needed.

## 2021-04-12 NOTE — TELEPHONE ENCOUNTER
José Miguel called to report that Scot has become very difficult and combative since the discontinue of his haldol at 4/1 appt.  She would like to talk with Elsa as soon as possible.

## 2021-04-12 NOTE — TELEPHONE ENCOUNTER
Attempted to reach pt's father (also the guardian) to inform him of the provider's recommendations and to request consent to communicate with group home. Pt's father did not answer phone call. LVM.    No consent to communicate form is on file for Group Home in which pt resides in.Therefore, unable to call group home back to discuss providers recommendations.

## 2021-04-13 ENCOUNTER — TELEPHONE (OUTPATIENT)
Dept: PSYCHIATRY | Facility: CLINIC | Age: 41
End: 2021-04-13

## 2021-04-13 NOTE — TELEPHONE ENCOUNTER
Received faxed request for patient Brilinta 90 mg. Med not filled by Elsa RENE and was last filled in July of 2020. Please review.

## 2021-04-14 DIAGNOSIS — I25.10 CORONARY ARTERY DISEASE DUE TO LIPID RICH PLAQUE: Primary | ICD-10-CM

## 2021-04-14 DIAGNOSIS — I46.9 CARDIAC ARREST (H): ICD-10-CM

## 2021-04-14 DIAGNOSIS — Z53.9 DIAGNOSIS NOT YET DEFINED: Primary | ICD-10-CM

## 2021-04-14 DIAGNOSIS — I25.83 CORONARY ARTERY DISEASE DUE TO LIPID RICH PLAQUE: Primary | ICD-10-CM

## 2021-04-14 DIAGNOSIS — I50.21 ACUTE SYSTOLIC HEART FAILURE (H): ICD-10-CM

## 2021-04-14 PROCEDURE — G0179 MD RECERTIFICATION HHA PT: HCPCS | Performed by: INTERNAL MEDICINE

## 2021-04-14 NOTE — TELEPHONE ENCOUNTER
Phone call to Scot's father, Gerald. He prefers to make no medication changes at this time until after talking with Elsa tomorrow (4/15/21) at appt. Gerald gave verbal consent for this writer to call José Miguel at the group home and let them know that no medication changes will be made until after appt. José Miguel verbalized understanding.    Routing to Elsa Rahman as ASHLEY.    Diana Avila RN on 4/14/2021 at 4:31 PM

## 2021-04-15 ENCOUNTER — VIRTUAL VISIT (OUTPATIENT)
Dept: PSYCHIATRY | Facility: CLINIC | Age: 41
End: 2021-04-15
Payer: COMMERCIAL

## 2021-04-15 DIAGNOSIS — F09 COGNITIVE DISORDER: ICD-10-CM

## 2021-04-15 DIAGNOSIS — F34.81 DISRUPTIVE MOOD DYSREGULATION DISORDER (H): Primary | ICD-10-CM

## 2021-04-15 DIAGNOSIS — T50.905A ADVERSE DRUG EFFECT, INITIAL ENCOUNTER: ICD-10-CM

## 2021-04-15 DIAGNOSIS — F51.04 PSYCHOPHYSIOLOGICAL INSOMNIA: ICD-10-CM

## 2021-04-15 PROCEDURE — 99214 OFFICE O/P EST MOD 30 MIN: CPT | Mod: 95 | Performed by: NURSE PRACTITIONER

## 2021-04-15 NOTE — PROGRESS NOTES
"Scot is a 40 year old who is being evaluated via a billable video visit.      How would you like to obtain your AVS? MyChart  If the video visit is dropped, the invitation should be resent by: Other e-mail: MY CHART  Will anyone else be joining your video visit? Yes: Gerald and José Miguel . How would they like to receive their invitation? Other e-mail: MY CHART      Video Start Time: 11:20 AM      Video-Visit Details    Type of service:  Video Visit    Video End Time:11:55 AM    Originating Location (pt. Location): Assisted Living    Distant Location (provider location):  [facility]     Platform used for Video Visit: Photoways         Outpatient Psychiatric Progress Note    Name: Scot Bishop   : 1980                    Primary Care Provider: Cachorro Morales MD   Therapist: None    PHQ-9 scores:  No flowsheet data found.    CLEMENT-7 scores:  No flowsheet data found.    Patient Identification:    Patient is a 40 year old year old, single  White American male  who presents for return visit with me.  Patient is currently disabled. Patient attended the session with His parents , who they agreed to have interview with. Patient prefers to be called: \" Scot\".    Interim History:    I last saw Scot Bishop for outpatient psychiatry Return Visit on 2021.     During that appointment, he appeared to have made some slight improvement in his demeanor.  While he continues to be reported to having some irritable outbursts, it seems to be occurring less often.  In looking at him today he was making no repetitive movements and was sitting quietly in a chair, sometimes looking straight at the computer screen.  He was able to make some verbal sounds and laughed a little bit.  Over the past 2 days he has been able to eat solid foods.  He also has been ambulating with assistance.  There is concern for leg stiffness that waxes and wanes.  I decreased the Haldol up to just " taking 2.5 mg at bedtime.  Should the leg stiffness continue, I would consider adding benztropine at bedtime.  If he remains stable at his next visit the Haldol can be discontinued to see how he does.  Quetiapine will remain the same as well as the paroxetine.  Trazodone will continue at 50 mg at bedtime..  The family feels that he is presenting as less overmedicated. .     Current medications include: acetaminophen (TYLENOL) 325 MG tablet, Take 2 tablets (650 mg) by mouth every 6 hours as needed for mild pain or fever (Patient taking differently: Take 650 mg by mouth every 6 hours as needed for mild pain or fever VIA PEG-Tube)  amantadine (SYMMETREL) 50 MG/5ML syrup, 100 mg by Per G Tube route every 12 hours VIA PEG-Tube  aspirin (ASA) 81 MG chewable tablet, 1 tablet (81 mg) by Per Feeding Tube route daily  atorvastatin (LIPITOR) 40 MG tablet, 1 tablet (40 mg) by Per G Tube route At Bedtime Via PEG-tube  bacitracin 500 UNIT/GM OINT, Apply 1 Application topically 2 times daily  - Topical as needed  bacitracin-neomycin-polymyxin (NEOSPORIN) 400-5-5000 external ointment, Apply 1 Application topically 2 times daily Topical as needed  Emollient (AQUAPHOR ADVANCED THERAPY) OINT, Apply topically 2 times daily As needed.  haloperidol (HALDOL) 5 MG tablet, 2.5 mg at 8 PM daily per peg tube  metoprolol tartrate (LOPRESSOR) 25 MG tablet, Take 0.5 tablets (12.5 mg) by mouth 2 times daily  nystatin (MYCOSTATIN) 904384 UNIT/GM external cream, Apply topically 2 times daily As needed  PARoxetine (PAXIL) 20 MG tablet, 1 tablet (20 mg) by Oral or Feeding Tube route every morning  QUEtiapine (SEROQUEL) 50 MG tablet, Two Tablets (100 mg) at 8 AM, one tablet (50 mg) at 2 PM, and Two Tablets (100 mg) at 8 PM via peg tube  ticagrelor (BRILINTA) 90 MG tablet, Take 1 tablet (90 mg) by mouth 2 times daily  traZODone (DESYREL) 50 MG tablet, 1 tablet (50 mg) by Per G Tube route At Bedtime  Valproate Sodium (VALPROIC ACID) 250 MG/5ML, 250 mg  by Enteral route 2 times daily   mineral oil-hydrophilic petrolatum (AQUAPHOR) external ointment, Apply topically 2 times daily   omeprazole (PRILOSEC) 2 mg/mL suspension, 40 mg by Per Feeding Tube route every morning (before breakfast)   sulfamethoxazole-trimethoprim (BACTRIM DS) 800-160 MG tablet, Take 1 tablet by mouth 2 times daily Please crush tablets and dose per G-tube and flush G-tube after dosing    No current facility-administered medications on file prior to visit.        The [place] Prescription Monitoring Program has been reviewed and there are no concerns about diversionary activity for controlled substances at this time.      I was able to review most recent Primary Care Provider, specialty provider, and therapy visit notes that I have access to.     Today, it is reported that he has become more aggressive.  He has had staff and at 1 point.  His mother.  He also has been kicking.  These episodes occur when people attempt to provide care for him as he is calmer when he is left alone.  He has had some random crying episodes and screaming episodes.  He has begun to eat and drink fluids now.     has no past medical history on file.    Social history updates:    Scot continues to live in an assisted living facility    Substance use updates:    He does not drink alcohol  Tobacco use: No    Vital Signs:   There were no vitals taken for this visit.    Labs:    Most recent laboratory results reviewed and no new labs.     Review of Systems:  10 systems (general, cardiovascular, respiratory, eyes, ENT, endocrine, GI, , M/S, neurological) were reviewed. Most pertinent finding(s) is/are: Ambulates with assistance, no shortness of breath, no skin rashes. The remaining systems are all unremarkable.    Mental Status Examination:  Appearance:  Fluctuates from being alert to being somnolent and casually dressed  Attitude:  somewhat cooperative   Eye Contact:  looking around room  Gait and Station: No dizziness or  falls and Uses wheelchair  Psychomotor Behavior:  fidgeting  Oriented to:  Person only  Attention Span and Concentration:  Poor  Speech:   mute  Mood:  anxious  Affect:  labile  Associations:  Unable to assess  Thought Process:  Unable to assess  Thought Content:  patient appears to be responding to internal stimuli  Recent and Remote Memory:  poor Not formally assessed. No amnesia.  Fund of Knowledge: low-normal  Insight:  Poor  Judgment:  poor  Impulse Control:  poor    Suicide Risk Assessment:  Today Scot Bishop's caregivers report that Scot has made no attempts to end his life. In addition, there are notable risk factors for self-harm, including comorbid medical condition of Traumatic brain injury with brain anoxia and wrecklessness. However, risk is mitigated by no family history of suicide. Therefore, based on all available evidence including the factors cited above, Scot Bishop does not appear to be at imminent risk for self-harm, does not meet criteria for a 72-hr hold, and therefore remains appropriate for ongoing outpatient level of care.  A thorough assessment of risk factors related to suicide and self-harm have been reviewed and are noted above. The patient convincingly denies suicidality on several occasions. Local community safety resources printed and reviewed for patient to use if needed. There was no deceit detected, and the patient presented in a manner that was believable.     DSM5 Diagnosis:  296.99 (F34.8) Disruptive Mood Dysregulation Disorder  780.52 (G47.00) Insomnia Disorder   With ohter medical comorbidity  Episodic      Medical comorbidities include:   Patient Active Problem List    Diagnosis Date Noted     Nicotine dependence 04/16/2021     Priority: Medium     Formatting of this note might be different from the original.  Created by Conversion       Backache 04/16/2021     Priority: Medium     Formatting of this note might be different from the original.  Created by  Conversion    Replacement Utility updated for latest IMO load       Thrombocytopenia, unspecified (H) 04/16/2021     Priority: Medium     Thrush, oral 12/09/2020     Priority: Medium     Advance care planning 12/04/2020     Priority: Medium     Formatting of this note might be different from the original.  Community Palliative Care was asked to see patient by inpatient palliative care on 11/23 for continuation of palliative care in a new setting.  Date of last visit: 12/9/2020    Formatting of this note is different from the original.  Advance Care Planning   Patient has identified Health Care Agent(s): Yes  Add Health Care Agents: Yes    Health Care Agent(s):  Guardian: Gerald Bishop Relationship: father Phone: [phone]     Patient has Advance Care Plan Documents (Health Care Directive, POLST): Yes    Advance Care Plan Documents:  POLST Form     Patient has identified Specific Treatment Preferences: Yes     How have preferences been verified: POLST    Specific Treatment Preferences:   a.) Code Status:  DNR/ Do Not Attempt Resuscitation - Allow a Natural Death  b.) Goals of Treatment:   ii. Selective Treatment: Use medical treatment, antibiotics, IV fluids and cardiac monitor as indicated. No intubation, advanced airway interventions, or mechanical ventilation. May consider less invasive airway support (e.g. CPAP, BiPAP). Transfer to hospital if indicated. Generally avoid the intensive care unit. All patients will receive comfort-focused treatments.  TREATMENT PLAN: Provide basic medical treatments aimed at treating new or reversible illness.  c.) Interventions and Treatments:  i.  Artificially Administered Nutrition and Hydration: - Long term artificial nutrion/hydration by tube through (not specified) (specify mouth/nose) and (not specified) (specify if ok with directly into GI tract)  ii.  Antibiotics: - Oral antibiotics only (NO IV/IM)       Acute respiratory failure with hypoxia (H) 12/04/2020      Priority: Medium     Nonverbal 11/11/2020     Priority: Medium     Gastrojejunostomy tube status (H) 11/11/2020     Priority: Medium     Cognitive disorder 11/11/2020     Priority: Medium     Aggression 11/09/2020     Priority: Medium     Agitation 09/21/2020     Priority: Medium     Yeast infection 09/03/2020     Priority: Medium     G tube feedings (H) 09/03/2020     Priority: Medium     Dysphagia 08/04/2020     Priority: Medium     Traumatic brain injury (H) 06/25/2020     Priority: Medium     Cardiac arrest (H) 05/17/2020     Priority: Medium     Back injury, sequela 10/27/2017     Priority: Medium     Formatting of this note might be different from the original.  WC Case w/ [employer] (www.accidentfund.Digital Guardian)  CLM #[id number];  Lydia Machuca [phone]         Assessment:    Scot Bishop is reported to be experiencing an increase in aggressive asked towards caregivers and family members.  This has presented in the form of kicking, hitting, and biting.  It seems to be occurring when people are trying to provide hands-on care to him.  The Haldol will be discontinued and I will increase his valproic acid to 500 mg twice daily.  A Depakote panel has been ordered after a week of being on the medication.  He will continue the paroxetine 20 mg daily for anxiety.  Seroquel is continued at 100 mg at 8 AM and 8 PM with 50 mg at 2 PM.  He will continue the trazodone at 50 mg at bedtime to help him sleep.  Lorazepam has been ordered twice daily at 0.5 mg for increased agitation and anxiety episodes.    Medication side effects and alternatives were reviewed. Health promotion activities recommended and reviewed today. All questions addressed. Education and counseling completed regarding risks and benefits of medications and psychotherapy options.    Treatment Plan:        1.  Increase valproic acid to 500 mg (10 ml) twice daily per PEG tube    2.  Haldol discontinued    3.  Depakote panel  ordered    4.  Continue paroxetine 20 mg daily    5.  Continue quetiapine 100 mg at 8 AM and 8 PM and 50 mg at 2 PM    6.  Continue trazodone 50 mg at bedtime  7.  Lorazepam 0.5 mg twice daily as needed for anxiety or agitation      Continue all other medications as reviewed per electronic medical record today.     Safety plan reviewed. To the Emergency Department as needed or call after hours crisis line at [phone] or [phone]. [place] Crisis Text Line. Text [place] to 663430 or Suicide LifeLine Chat: suicidePristones.org/chat/    To schedule individual or family therapy, call [facility] at [phone].    Schedule an appointment with me in 2 weeks or sooner as needed. Call [facility] at [phone] to schedule.    Follow up with primary care provider as planned or for acute medical concerns.    Call the psychiatric nurse line with medication questions or concerns at [phone].    Supersolidt may be used to communicate with your provider, but this is not intended to be used for emergencies.    Crisis Resources:    National Suicide Prevention Lifeline: 368.473.1598 (TTY: 349.924.3103). Call anytime for help.  (www.suicidepreventionlifeline.org)  National [place] on Mental Illness (www.melvin.org): 917.974.6764 or 800-184-8822.   Mental Health Association (www.mentalhealth.org): 585.195.2713 or 932-396-0671.  [place] Crisis Text Line: Text [place] to 055589  Suicide LifeLine Chat: suicidePristones.org/chat    Administrative Billing:   Time spent with patient was 30 minutes and greater than 50% of time or 20 minutes was spent in counseling and coordination of care regarding above diagnoses and treatment plan.    Patient Status:  Patient will continue to be seen for ongoing consultation and stabilization.    Signed:   LASHAWN Jeong-BC   Psychiatry

## 2021-04-16 ENCOUNTER — HOSPITAL ENCOUNTER (EMERGENCY)
Facility: CLINIC | Age: 41
Discharge: HOME | End: 2021-04-16
Attending: NURSE PRACTITIONER | Admitting: NURSE PRACTITIONER
Payer: COMMERCIAL

## 2021-04-16 ENCOUNTER — NURSE TRIAGE (OUTPATIENT)
Dept: INTERNAL MEDICINE | Facility: CLINIC | Age: 41
End: 2021-04-16

## 2021-04-16 ENCOUNTER — VIRTUAL VISIT (OUTPATIENT)
Dept: FAMILY MEDICINE | Facility: CLINIC | Age: 41
End: 2021-04-16
Payer: COMMERCIAL

## 2021-04-16 VITALS
HEART RATE: 106 BPM | OXYGEN SATURATION: 95 % | RESPIRATION RATE: 14 BRPM | SYSTOLIC BLOOD PRESSURE: 86 MMHG | DIASTOLIC BLOOD PRESSURE: 46 MMHG | TEMPERATURE: 98 F

## 2021-04-16 VITALS — DIASTOLIC BLOOD PRESSURE: 75 MMHG | SYSTOLIC BLOOD PRESSURE: 97 MMHG | TEMPERATURE: 97.1 F

## 2021-04-16 DIAGNOSIS — Z51.89 VISIT FOR WOUND CHECK: ICD-10-CM

## 2021-04-16 DIAGNOSIS — D69.6 THROMBOCYTOPENIA, UNSPECIFIED (H): ICD-10-CM

## 2021-04-16 DIAGNOSIS — S06.9X9S TRAUMATIC BRAIN INJURY WITH LOSS OF CONSCIOUSNESS, SEQUELA (H): ICD-10-CM

## 2021-04-16 DIAGNOSIS — L03.311 CELLULITIS OF LEFT ABDOMINAL WALL: Primary | ICD-10-CM

## 2021-04-16 DIAGNOSIS — Z93.1 G TUBE FEEDINGS (H): ICD-10-CM

## 2021-04-16 PROBLEM — B37.9 YEAST INFECTION: Status: ACTIVE | Noted: 2020-09-03

## 2021-04-16 PROBLEM — F09 COGNITIVE DISORDER: Status: ACTIVE | Noted: 2020-11-11

## 2021-04-16 PROBLEM — B37.0 THRUSH, ORAL: Status: ACTIVE | Noted: 2020-12-09

## 2021-04-16 PROBLEM — R13.10 DYSPHAGIA: Status: ACTIVE | Noted: 2020-08-04

## 2021-04-16 PROBLEM — J96.01 ACUTE RESPIRATORY FAILURE WITH HYPOXIA (H): Status: ACTIVE | Noted: 2020-12-04

## 2021-04-16 PROBLEM — R47.01 NONVERBAL: Status: ACTIVE | Noted: 2020-11-11

## 2021-04-16 PROBLEM — R46.89 AGGRESSION: Status: ACTIVE | Noted: 2020-11-09

## 2021-04-16 PROBLEM — Z93.4 GASTROJEJUNOSTOMY TUBE STATUS (H): Status: ACTIVE | Noted: 2020-11-11

## 2021-04-16 PROBLEM — R45.1 AGITATION: Status: ACTIVE | Noted: 2020-09-21

## 2021-04-16 PROBLEM — S39.92XS BACK INJURY, SEQUELA: Status: ACTIVE | Noted: 2017-10-27

## 2021-04-16 PROBLEM — Z71.89 ADVANCE CARE PLANNING: Status: ACTIVE | Noted: 2020-12-04

## 2021-04-16 PROBLEM — F17.200 NICOTINE DEPENDENCE: Status: ACTIVE | Noted: 2021-04-16

## 2021-04-16 PROBLEM — S06.9XAA TRAUMATIC BRAIN INJURY (H): Status: ACTIVE | Noted: 2020-06-25

## 2021-04-16 PROBLEM — M54.9 BACKACHE: Status: ACTIVE | Noted: 2021-04-16

## 2021-04-16 PROCEDURE — 99283 EMERGENCY DEPT VISIT LOW MDM: CPT

## 2021-04-16 PROCEDURE — 99214 OFFICE O/P EST MOD 30 MIN: CPT | Mod: 95 | Performed by: INTERNAL MEDICINE

## 2021-04-16 RX ORDER — MULTIVITAMIN
1 TABLET,CHEWABLE ORAL
COMMUNITY
Start: 2020-08-01 | End: 2021-09-29

## 2021-04-16 RX ORDER — BROMOCRIPTINE MESYLATE 2.5 MG/1
2.5 TABLET ORAL
COMMUNITY
Start: 2020-07-31 | End: 2021-09-29

## 2021-04-16 RX ORDER — DIVALPROEX SODIUM 125 MG/1
500 CAPSULE, COATED PELLETS ORAL
COMMUNITY
Start: 2020-11-27 | End: 2021-04-29 | Stop reason: ALTCHOICE

## 2021-04-16 RX ORDER — CHLORHEXIDINE GLUCONATE ORAL RINSE 1.2 MG/ML
SOLUTION DENTAL
COMMUNITY
Start: 2020-11-01 | End: 2021-05-26

## 2021-04-16 RX ORDER — METOCLOPRAMIDE 5 MG/1
TABLET ORAL
COMMUNITY
Start: 2020-08-10 | End: 2021-09-29

## 2021-04-16 RX ORDER — POTASSIUM CHLORIDE 1.5 G/1.58G
20 POWDER, FOR SOLUTION ORAL
COMMUNITY
Start: 2020-11-27 | End: 2021-05-26

## 2021-04-16 RX ORDER — DIVALPROEX SODIUM 125 MG/1
CAPSULE, COATED PELLETS ORAL
COMMUNITY
Start: 2020-12-18 | End: 2021-04-29 | Stop reason: ALTCHOICE

## 2021-04-16 RX ORDER — SULFAMETHOXAZOLE/TRIMETHOPRIM 800-160 MG
1 TABLET ORAL 2 TIMES DAILY
Qty: 20 TABLET | Refills: 0 | Status: SHIPPED | OUTPATIENT
Start: 2021-04-16 | End: 2021-04-26

## 2021-04-16 RX ORDER — AMOXICILLIN AND CLAVULANATE POTASSIUM 250; 62.5 MG/5ML; MG/5ML
POWDER, FOR SUSPENSION ORAL
COMMUNITY
Start: 2020-08-23 | End: 2021-04-29

## 2021-04-16 RX ORDER — LISINOPRIL 2.5 MG/1
TABLET ORAL
COMMUNITY
Start: 2020-09-19 | End: 2021-09-29

## 2021-04-16 RX ORDER — PANTOPRAZOLE SODIUM 40 MG/1
40 TABLET, DELAYED RELEASE ORAL
COMMUNITY
Start: 2020-08-01 | End: 2021-09-29

## 2021-04-16 RX ORDER — QUETIAPINE FUMARATE 100 MG/1
TABLET, FILM COATED ORAL
COMMUNITY
Start: 2021-04-10 | End: 2021-04-29 | Stop reason: DRUGHIGH

## 2021-04-16 RX ORDER — POTASSIUM CHLORIDE 20MEQ/15ML
LIQUID (ML) ORAL
COMMUNITY
Start: 2020-11-28 | End: 2021-05-26

## 2021-04-16 NOTE — TELEPHONE ENCOUNTER
"Group home calling. Patient got a scrape on left side above pelvic area, thinks from wheelchair belt, about a week ago. It is \"not really\" open or draining. Has changed in color. Put triple antibioitcs and covered it. Not really healing. 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 Feels it is infected. Looks yellowish with red around it. No fever. Getting worse. Feels like it almost got worse when covered it. Spreading. Redness and yellow spreading.    Video appt made for 220 pm today with Dr. Armenta      Additional Information    Negative: [1] Major bleeding (e.g., actively dripping or spurting) AND [2] can't be stopped    Negative: Sounds like a life-threatening emergency to the triager    Negative: [1] Bleeding AND [2] won't stop after 10 minutes of direct pressure (using correct technique)    Negative: Skin is split open or gaping (or length > 1/2 inch or 12 mm)    Negative: Skin loss goes very deep (e.g., can see bones or tendons)    Negative: Skin loss involves more than 10% of surface area (Note: the palm of the hand = 1%)    Negative: [1] Dirt in the wound AND [2] not removed with 15 minutes of scrubbing    Negative: Sounds like a serious injury to the triager    Negative: [1] SEVERE pain AND [2] not improved 2 hours after pain medicine    [1] Looks infected AND [2] large red area (>2 inches or 5 cm) or streak    Protocols used: [id number]-EP    "

## 2021-04-16 NOTE — PATIENT INSTRUCTIONS
Given the clinical picture and patient wheel chair bound with cognitive impairment I am suspecting he is in sepsis due to abdominal wall cellulitis as per the vitals recorded by the Group home director José Miguel with BP 90/70's. Given instructions to go to ER immediately for need of IV antibiotics and also higher level of care which she understood and agreed. Meanwhile sent in emperic antibiotic and continue wound care.     ==========================    Patient Education     Abscess Drainage  An abscess is a pocket of pus that forms around an infection. Pus is a fluid made up of germs (bacteria), white blood cells, and other matter. Draining pus from an infected area or organ inside the body may be needed. This helps heal the infection. The procedure is usually done by a specially trained doctor called an interventional radiologist.   How do I get ready for abscess drainage?  Follow any instructions you are given on how to prepare, including:    Don't eat or drink anything for 6 hours before the procedure.    Tell the technologist if you are, or could be, pregnant or if you are breastfeeding.    Tell your healthcare provider and the technologist if you are allergic to X-ray dye (contrast medium) or other medicines.    Be sure your healthcare provider knows about any health conditions you have and all medicines you take. You may be told to stop taking some or all of them before the test. This includes:  ? All prescription medicines  ? Over-the-counter medicines that don t need a prescription  ? Any street drugs you may use   ? Herbs, vitamins, kelp, seaweed, cough syrups, and any other supplements  What happens during abscess drainage?    You will change into a hospital gown and lie on an X-ray table. You may lie on your back, front, or side, depending on the site of the abscess.    An IV line is put into your vein to give you fluids and medicines. You may be given medicine through the IV to help you relax.    The skin  over the abscess is cleaned. A local anesthetic is applied to numb the skin.    Using CT scan or ultrasound images as a guide, the radiologist puts a needle through the skin and guides it to the abscess. The needle is then replaced with a thin, flexible tube (catheter).    Pus drains from the abscess through the catheter. A bag or suction bulb will be attached to the catheter to hold the pus as it drains.    The drainage catheter will be temporarily sutured or taped to your skin to help secure it and prevent it from moving.    The entire procedure may take 30 minutes or longer, depending on the location of the abscess.    What happens after abscess drainage?    A slight fever is normal for the first 24 hours after the procedure.    The catheter and drainage bag will likely remain in place for several days. Follow any instructions you are given for caring for the catheter and drainage site.    See your healthcare provider for a follow-up appointment to check the infection and to have the catheter removed.    When to call your healthcare provider  Call your healthcare provider if you have:    Bleeding    Fever of 100.4 F (38 C) or higher, or as directed by your healthcare provider    Chills    New or worsening pain    Fluid stops draining from the tube, the drainage changes color, or the tube moves or comes out  [employer] last reviewed this educational content on 4/1/2020 2000-2021 [employer]. All rights reserved. This information is not intended as a substitute for professional medical care. Always follow your healthcare professional's instructions.

## 2021-04-16 NOTE — TELEPHONE ENCOUNTER
Reason for Call:  Medication or medication refill:    Do you use a [facility]?  Name of the pharmacy and phone number for the current request:    [facility] - [place], [place] - [address] [place] [address]    Name of the medication requested:   Discontinue :   haloperidol (HALDOL) 5 MG tablet    Inc: Depakote    And PRN: Ativan       Other request: rcvd call from José Miguel @ [facility] [phone]  stating the pharm keanu not rcvd any of the above med changes  She is asking for call back     Can we leave a detailed message on this number? YES    Phone number patient can be reached at: Other phone number:  [phone]    Best Time: any    Call taken on 4/16/2021 at 12:11 PM by Carl Serrano

## 2021-04-16 NOTE — TELEPHONE ENCOUNTER
Patient has refill on haloperidol 5 mg tablet. Called patient no answer, left message for José Miguel. Called [facility], spoke with staff who said the meds have been delivered to the patient's home.

## 2021-04-16 NOTE — PROGRESS NOTES
Scot is a 40 year old who is being evaluated via a billable video visit.      How would you like to obtain your AVS? MyChart  If the video visit is dropped, the invitation should be resent by: Text to cell phone: [phone]   Will anyone else be joining your video visit? No      Video Start Time: Start: 04/16/2021 02:28 pm      Assessment and Plan  1. Cellulitis of left abdominal wall  New problem, given the debilitated status of the patient it was not possible to assess the mental deterioration of the patient. Possible sepsis as differential diagnosis. Explained the Care giver on the complications of spread of infection to the viscera and need to be in ER for IV antibiotics and resuscitation for sepsis which she understood and agreed. Home health wound care and emperic antibiotic to be started ASAP. Will need inpatient management. AVS given.   - sulfamethoxazole-trimethoprim (BACTRIM DS) 800-160 MG tablet; Take 1 tablet by mouth 2 times daily for 10 days  Dispense: 20 tablet; Refill: 0       2. G tube feedings (H)  3. Thrombocytopenia, unspecified (H)  4. Traumatic brain injury with loss of consciousness, sequela (H)  Stable, chronic conditions which do not have any acute changes at this time. As per chart review, pt had accidental chemical exposure, had Anoxic brain injury, Cardiac arrest and later resuscitated with current debility , wheel chair bound. Pt is completeley disoriented and unresponsive to any verbal commands.       Patient Instructions   Given the clinical picture and patient wheel chair bound with cognitive impairment I am suspecting he is in sepsis due to abdominal wall cellulitis as per the vitals recorded by the Group home director José Miguel with BP 90/70's. Given instructions to go to ER immediately for need of IV antibiotics and also higher level of care which she understood and agreed. Meanwhile sent in emperic antibiotic and continue wound care.     ==========================    Patient  Education     Abscess Drainage  An abscess is a pocket of pus that forms around an infection. Pus is a fluid made up of germs (bacteria), white blood cells, and other matter. Draining pus from an infected area or organ inside the body may be needed. This helps heal the infection. The procedure is usually done by a specially trained doctor called an interventional radiologist.   How do I get ready for abscess drainage?  Follow any instructions you are given on how to prepare, including:    Don't eat or drink anything for 6 hours before the procedure.    Tell the technologist if you are, or could be, pregnant or if you are breastfeeding.    Tell your healthcare provider and the technologist if you are allergic to X-ray dye (contrast medium) or other medicines.    Be sure your healthcare provider knows about any health conditions you have and all medicines you take. You may be told to stop taking some or all of them before the test. This includes:  ? All prescription medicines  ? Over-the-counter medicines that don t need a prescription  ? Any street drugs you may use   ? Herbs, vitamins, kelp, seaweed, cough syrups, and any other supplements  What happens during abscess drainage?    You will change into a hospital gown and lie on an X-ray table. You may lie on your back, front, or side, depending on the site of the abscess.    An IV line is put into your vein to give you fluids and medicines. You may be given medicine through the IV to help you relax.    The skin over the abscess is cleaned. A local anesthetic is applied to numb the skin.    Using CT scan or ultrasound images as a guide, the radiologist puts a needle through the skin and guides it to the abscess. The needle is then replaced with a thin, flexible tube (catheter).    Pus drains from the abscess through the catheter. A bag or suction bulb will be attached to the catheter to hold the pus as it drains.    The drainage catheter will be temporarily sutured  or taped to your skin to help secure it and prevent it from moving.    The entire procedure may take 30 minutes or longer, depending on the location of the abscess.    What happens after abscess drainage?    A slight fever is normal for the first 24 hours after the procedure.    The catheter and drainage bag will likely remain in place for several days. Follow any instructions you are given for caring for the catheter and drainage site.    See your healthcare provider for a follow-up appointment to check the infection and to have the catheter removed.    When to call your healthcare provider  Call your healthcare provider if you have:    Bleeding    Fever of 100.4 F (38 C) or higher, or as directed by your healthcare provider    Chills    New or worsening pain    Fluid stops draining from the tube, the drainage changes color, or the tube moves or comes out  [email or web address] last reviewed this educational content on 4/1/2020 2000-2021 [employer]. All rights reserved. This information is not intended as a substitute for professional medical care. Always follow your healthcare professional's instructions.             Return in about 1 week (around 4/23/2021), or if symptoms worsen or fail to improve, for Follow up of last visit.    Nirmala Armenta MD  [facility] RYAN Ellison is a 40 year old who presents for the following health issues       HPI     Concern - laceration   Onset: last weekend   Description: pt scrapped his lower abdomen on wheelchair seatbelt.   Intensity: mild  Progression of Symptoms:  same  Accompanying Signs & Symptoms: red and yellow to the wound   Previous history of similar problem:   Precipitating factors:        Worsened by:   Alleviating factors:        Improved by:   Therapies tried and outcome: \     No Known Allergies     History reviewed. No pertinent past medical history.    Past Surgical History:   Procedure Laterality Date     CV  CORONARY ANGIOGRAM N/A 5/17/2020    Procedure: Coronary Angiogram;  Surgeon: Chadd Newby MD;  Location:  [facility]     CV EXTRACORPERAL MEMBRANE OXYGENATION N/A 5/17/2020    Procedure: Extracorporeal Membrance Oxygenation;  Surgeon: Chadd Newby MD;  Location:  [facility]     CV INTRA-AORTIC BALLOON PUMP INSERTION N/A 5/17/2020    Procedure: Intra-Aortic Balloon Pump Insertion;  Surgeon: Chadd Newby MD;  Location:  [facility]     CV PCI STENT DRUG ELUTING N/A 5/17/2020    Procedure: Percutaneous Coronary Intervention Stent Drug Eluting;  Surgeon: Chadd Newby MD;  Location:  [facility]     IR GASTROSTOMY TUBE PERCUTANEOUS PLCMNT  6/9/2020     REMOVE EXTRACORPORAL MEMBRANE OXYGENATOR ADULT (OUTSIDE OR) N/A 5/19/2020    Procedure: ECMO Decannulation at Bedside;  Surgeon: Mariano Workman MD;  Location:  OR       Family History   Problem Relation Age of Onset     Parkinsonism Father        Social History     Tobacco Use     Smoking status: Former Smoker     Smokeless tobacco: Never Used   Substance Use Topics     Alcohol use: Not Currently        Current Outpatient Medications   Medication     acetaminophen (TYLENOL) 325 MG tablet     amantadine (SYMMETREL) 50 MG/5ML syrup     aspirin (ASA) 81 MG chewable tablet     atorvastatin (LIPITOR) 40 MG tablet     bacitracin 500 UNIT/GM OINT     bacitracin-neomycin-polymyxin (NEOSPORIN) 400-5-5000 external ointment     bromocriptine (PARLODEL) 2.5 MG tablet     divalproex sodium delayed-release (DEPAKOTE SPRINKLE) 125 MG DR capsule     Emollient (AQUAPHOR ADVANCED THERAPY) OINT     haloperidol (HALDOL) 5 MG tablet     metoprolol tartrate (LOPRESSOR) 25 MG tablet     mineral oil-hydrophilic petrolatum (AQUAPHOR) external ointment     nystatin (MYCOSTATIN) 738638 UNIT/GM external cream     omeprazole (PRILOSEC) 2 mg/mL suspension     pantoprazole (PROTONIX) 40 MG EC tablet      PARoxetine (PAXIL) 20 MG tablet     Pediatric Multiple Vit-C-FA (CHILDRENS CHEWABLE VITAMINS) CHEW     Potassium Bicarb-Citric Acid 20 MEQ TBEF     potassium chloride (KLOR-CON) 20 MEQ packet     QUEtiapine (SEROQUEL) 50 MG tablet     sulfamethoxazole-trimethoprim (BACTRIM DS) 800-160 MG tablet     sulfamethoxazole-trimethoprim (BACTRIM DS) 800-160 MG tablet     ticagrelor (BRILINTA) 90 MG tablet     traZODone (DESYREL) 50 MG tablet     Valproate Sodium (VALPROIC ACID) 250 MG/5ML     amoxicillin-clavulanate (AUGMENTIN) 250-62.5 MG/5ML suspension     chlorhexidine (PERIDEX) 0.12 % solution     divalproex sodium delayed-release (DEPAKOTE SPRINKLE) 125 MG DR capsule     lisinopril (ZESTRIL) 2.5 MG tablet     metoclopramide (REGLAN) 5 MG tablet     potassium chloride (KAYCIEL) 20 MEQ/15ML (10%) solution     QUEtiapine (SEROQUEL) 100 MG tablet     No current facility-administered medications for this visit.         Review of Systems   Constitutional, HEENT, cardiovascular, pulmonary, GI, , musculoskeletal, neuro, skin, endocrine and psych systems are negative, except as otherwise noted.      Objective           Vitals:  No vitals were obtained today due to virtual visit.    Physical Exam     BP 97/75 (BP Location: Right arm, Patient Position: Sitting, Cuff Size: Adult Regular)   Temp 97.1  F (36.2  C)   GENERAL: Pt is debilitated , combating with the RN and non cooperative to check vitals and entered the BP and temperature with difficulty.   SKIN: POSITIVE for Abdominal wall cellulitis with wound extending around 15 - 20 cm in size, irregular borders , erythematous base and yellowish discahrge seen on the open wound.   NEURO: Cranial nerves grossly intact.  Mentation and speech appropriate for age.  PSYCH: Mentation appears normal, affect normal/bright, judgement and insight intact, normal speech and appearance well-groomed.      Labs and imaging reviewed and discussed with the patient.      Video-Visit  Details    Type of service:  Video Visit    Video End Time:Stop: 04/16/2021 02:43 pm    Originating Location (pt. Location): Home    Distant Location (provider location):  [facility]     Platform used for Video Visit: Acteavo

## 2021-04-17 NOTE — ED PROVIDER NOTES
"History   Chief Complaint:  Wound Check     The history is provided by a caregiver. The history is limited by the condition of the patient.      Scot Bishop is a 40 year old male with history of TBI, cardiac arrest, non-verbal, with a G tube in place, who presents with his caregiver for a wound check. Staff noticed wound to left lower abdomen about 4 days ago. Denies any falls or leaking from the G tube. Was started on antibiotics 2 days ago.  Group home staff notes that they had covered the wound however they felt that it would be better to allow it to \"dry\" therefore they did not cover it for the last couple of days.  He notes that there was an episode of low temperature this morning and as a result was advised to come to the emergency department.    Caregiver denies a history of MRSA but note the patient is fairly new to their facility.    Review of Systems   Unable to perform ROS: Patient nonverbal       Allergies:  No Known Allergies    Medications:  Aspirin 81 mg  Lipitor  Parlodel  Depakote  Haldol  Lisinopril  Lopressor  Protonix  Paxil   Seroquel  Bactrim  Brilinta  Trazodone  Valproic acid    Past Medical History:    Nicotine dependence  Acute respiratory failure  Nonverbal  G tube in place  Cognitive disorder  TBI  Cardiac arrest    Past Surgical History:    Coronary angiogram  Extracorporal membrane oxygenation  Intra-aortic balloon pump insertion  PCI stent drug eluting  Gastrostomy tube placement  ECHO      Family History:    Parkinsonism     Social History:  Smoking status: Former  Alcohol use: Former  Marital Status: Single  Presents to the ED with his care giver.  PCP: Cachorro Morales    Physical Exam     Patient Vitals for the past 24 hrs:   BP Temp Temp src Pulse Resp SpO2   04/16/21 1900 -- (P) 97.9  F (36.6  C) (P) Temporal (P) 90 -- --   04/16/21 1648 (!) 86/46 -- -- -- -- --   04/16/21 1647 -- 98  F (36.7  C) -- 106 14 95 %       Physical Exam  General: Mentally and physically handicap, " though at baseline. Unable to respond. Moderate discomfort, well kept   HENT:  Normal voice, No lymphadenopathy  Eyes:  The pupils are equal, round, and reactive to light, Conjunctiva normal, No scleral icterus   Neck:  Normal range of motion  CV:  Normal Pulses  Resp:  Non-labored, No cough  GI:  G tube in place.   MS:  Normal muscular tone, moves all extremities  Skin:  No rash. Left lower abdomen with a 4 x 7 cm area of abrasion that appears to be healing well. No fluctuance or induration. Minimal erythema surrounding edge of wound. Non-tender.  Neuro: Speech is normal and fluent  Psych:  Awake. Alert.  Normal affect.  Appropriate interactions. Good eye contact    Emergency Department Course   Emergency Department Course:    Reviewed:  I reviewed nursing notes and vitals    Assessments:  1925 I obtained history and examined the patient as noted above.     Disposition:  The patient was discharged to home.     Impression & Plan   Medical Decision Making:  Scot Bishop is a 40 year old male who presents today with group home staff for left lower quadrant abdominal wound.  His exam is consistent with a healing superficial abrasion.  There is no signs of deeper space infection or abscess.  No evidence of systemic infection.  No indication for antibiotics.  We discussed appropriate covering for the wound to help it heal and allow for less friction over the area as this has developed where his seatbelt and transfer belt across his abdomen.  Follow-up with primary care provider as needed.  No other concerns or complaints.  Findings and plan discussed with the group home staff.  They are comfortable with this plan.  Return protocols discussed.    Diagnosis:    ICD-10-CM    1. Visit for wound check  Z51.89        Scribe Disclosure:  I, Taisha Mcneal, am serving as a scribe on 4/16/2021 at 7:22 PM to personally document services performed by Scot Castro APRN* based on my observations and the provider's statements  to me.      Scot Castro, TERRENCE CNP  04/16/21 2048       Scot Castro, TERRENCE CNP  04/22/21 3582

## 2021-04-17 NOTE — DISCHARGE INSTRUCTIONS
Continue to take antibiotics.  Finish the entire course.  Cover wound with a protective dressing giving the area that comes in contact with the transfer belts.  You may put a thin layer of antibiotic ointment such as Neosporin or bacitracin over it.  The wound does appear to be healing well.

## 2021-04-19 RX ORDER — VALPROIC ACID 250 MG/5ML
500 SOLUTION ORAL 2 TIMES DAILY
Qty: 600 ML | Refills: 0 | Status: SHIPPED | OUTPATIENT
Start: 2021-04-19 | End: 2021-04-29

## 2021-04-19 RX ORDER — LORAZEPAM 0.5 MG/1
0.5 TABLET ORAL 2 TIMES DAILY PRN
Qty: 60 TABLET | Refills: 0 | Status: SHIPPED | OUTPATIENT
Start: 2021-04-19 | End: 2021-04-29

## 2021-04-19 NOTE — TELEPHONE ENCOUNTER
Spoke to José Miguel. She states that medication changes were made on 4/15/21 for pt's haldol, depakote and lorazepam, but they have yet to see those go through to pharmacy. Routing to provider as visit note is not complete.

## 2021-04-19 NOTE — TELEPHONE ENCOUNTER
José Miguel called back stating the medication changes order needs to be sent [facility] to [facility].

## 2021-04-19 NOTE — PATIENT INSTRUCTIONS
1.  Increase valproic acid to 500 mg (10 ml) twice daily per PEG tube    2.  Haldol discontinued    3.  Depakote panel ordered    4.  Continue paroxetine 20 mg daily    5.  Continue quetiapine 100 mg at 8 AM and 8 PM and 50 mg at 2 PM  6.  Continue trazodone 50 mg at bedtime  7.  Add lorazepam 0.5 mg tablet up to twice daily as needed for agitation or anxiety      Continue all other medications as reviewed per electronic medical record today.     Safety plan reviewed. To the Emergency Department as needed or call after hours crisis line at [phone] or [phone]. [place] Crisis Text Line. Text [place] to 543143 or Suicide LifeLine Chat: CHIC.TV.org/chat/    To schedule individual or family therapy, call [facility] at [phone].    Schedule an appointment with me in 2 weeks or sooner as needed. Call [facility] at [phone] to schedule.    Follow up with primary care provider as planned or for acute medical concerns.    Call the psychiatric nurse line with medication questions or concerns at [phone].    [employer] may be used to communicate with your provider, but this is not intended to be used for emergencies.    Crisis Resources:    National Suicide Prevention Lifeline: 834.483.9584 (TTY: 331.955.3227). Call anytime for help.  (www.suicidepreventionlifeline.org)  National [place] on Mental Illness (www.melvin.org): 262.762.3380 or 407-472-7849.   Mental Health Association (www.mentalhealth.org): 899.307.9648 or 698-663-2147.  [place] Crisis Text Line: Text [place] to 578557  Suicide LifeLine Chat: suicideSmith Electric [employer]icles.org/chat

## 2021-04-21 NOTE — TELEPHONE ENCOUNTER
Phone call to [facility], informed them that AVS from most recent visit (4/15/21) with Elsa Mcbride CNP was being faxed ([phone]) to [facility].

## 2021-04-21 NOTE — TELEPHONE ENCOUNTER
Phone call to José Miguel at group home. She confirmed that new medication orders were received by pharmacy and started. AVS to be faxed to [phone].     José Miguel reported that Scot will have a PCP appt soon as follow up to a recent emergency room visit and will have labs ordered by Elsa Rahman completed at that time.    Diana Avila RN on 4/21/2021 at 12:40 PM

## 2021-04-26 ENCOUNTER — VIRTUAL VISIT (OUTPATIENT)
Dept: INTERNAL MEDICINE | Facility: CLINIC | Age: 41
End: 2021-04-26
Payer: COMMERCIAL

## 2021-04-26 DIAGNOSIS — Z93.1 G TUBE FEEDINGS (H): ICD-10-CM

## 2021-04-26 DIAGNOSIS — S06.9X9S TRAUMATIC BRAIN INJURY WITH LOSS OF CONSCIOUSNESS, SEQUELA (H): Primary | ICD-10-CM

## 2021-04-26 DIAGNOSIS — T50.905A ADVERSE DRUG EFFECT, INITIAL ENCOUNTER: ICD-10-CM

## 2021-04-26 LAB
AMMONIA PLAS-SCNC: 21 UMOL/L (ref 10–50)
ERYTHROCYTE [DISTWIDTH] IN BLOOD BY AUTOMATED COUNT: 12.7 % (ref 10–15)
HCT VFR BLD AUTO: 41.3 % (ref 40–53)
HGB BLD-MCNC: 13.1 G/DL (ref 13.3–17.7)
MCH RBC QN AUTO: 31.4 PG (ref 26.5–33)
MCHC RBC AUTO-ENTMCNC: 31.7 G/DL (ref 31.5–36.5)
MCV RBC AUTO: 99 FL (ref 78–100)
PLATELET # BLD AUTO: 243 10E9/L (ref 150–450)
RBC # BLD AUTO: 4.17 10E12/L (ref 4.4–5.9)
WBC # BLD AUTO: 7.7 10E9/L (ref 4–11)

## 2021-04-26 PROCEDURE — 36415 COLL VENOUS BLD VENIPUNCTURE: CPT | Performed by: NURSE PRACTITIONER

## 2021-04-26 PROCEDURE — 80076 HEPATIC FUNCTION PANEL: CPT | Performed by: NURSE PRACTITIONER

## 2021-04-26 PROCEDURE — 82140 ASSAY OF AMMONIA: CPT | Performed by: NURSE PRACTITIONER

## 2021-04-26 PROCEDURE — 99214 OFFICE O/P EST MOD 30 MIN: CPT | Mod: GT | Performed by: INTERNAL MEDICINE

## 2021-04-26 PROCEDURE — 85027 COMPLETE CBC AUTOMATED: CPT | Performed by: NURSE PRACTITIONER

## 2021-04-26 PROCEDURE — 80164 ASSAY DIPROPYLACETIC ACD TOT: CPT | Performed by: NURSE PRACTITIONER

## 2021-04-26 NOTE — PROGRESS NOTES
"Scot is a 40 year old who is being evaluated via a billable video visit.      How would you like to obtain your AVS? MyChart  If the video visit is dropped, the invitation should be resent by: Text to cell phone: [phone]  Will anyone else be joining your video visit? No    Video Start Time: 243PM    Assessment & Plan     Traumatic brain injury with loss of consciousness, sequela (H)  Doing well stable and continuing with supportive care.  Continue to monitor wound site  - NUTRITION REFERRAL    G tube feedings (H)  Suggest follow-up with nutrition to assess dietary changes to advance from G-tube to oral intake and yet maintain dietary weight gain  - NUTRITION REFERRAL     See Patient Instructions    Return in about 1 month (around 5/26/2021) for Nutrition assessment.    Cachorro Morales MD  [facility]    Carlos Ellison is a 40 year old who presents for the following health issues  accompanied by his group home staff:    HPI     ED/UC Followup:    Facility:  [facility] ER  Date of visit: 4/16/21  Reason for visit: Wound check, cellulitis of left abdominal wall   Current Status: Improving. Has completed abx      Scot Bishop has a history of a prior TBI, cardiac arrest, is non-verbal, with a G tube in placed, who presented with his caregiver for a wound check to the ER on 4/16/21. Staff noticed wound to left lower abdomen. Denied any falls or leaking from the G tube. Was started on antibiotics.  Group home staff notes that they had covered the wound however they felt that it would be better to allow it to \"dry\" therefore they did not cover it for the last couple of days.    In the emergency room it was felt that his exam was consistent with a healing superficial abrasion and there were no signs of deep space infection or abscess.  There is no systemic signs either thus no antibiotics were given.    The  states that the wound has been looking good.  He is " doing well.  They would like a nutrition evaluation as the patient is advancing diet from a G-tube to a full dietary schedule based on recent swallow study demonstrating no aspiration.      Review of Systems   CONSTITUTIONAL: NEGATIVE for fever, chills, mild change in weight  ENT/MOUTH: NEGATIVE for ear, mouth and throat problems  RESP: NEGATIVE for significant cough or SOB  CV: NEGATIVE for chest pain, palpitations or peripheral edema  GI: NEGATIVE for nausea, abdominal pain, heartburn, or change in bowel habits  : NEGATIVE for frequency, dysuria, or hematuria  ENDOCRINE: NEGATIVE for temperature intolerance  HEME: NEGATIVE for bleeding problems  PSYCHIATRIC: NEGATIVE for changes in mood or affect      Objective         Vitals:  No vitals were obtained today due to virtual visit.    Physical Exam   GENERAL: Healthy, alert and no distress  EYES: Eyes grossly normal to inspection.  No discharge or erythema, or obvious scleral/conjunctival abnormalities.  RESP: No audible wheeze, cough, or visible cyanosis.  No visible retractions or increased work of breathing.    SKIN: Left lower abdomen with a 4 x 7 cm area of abrasion that appears to be healing well. No fluctuance or induration. Minimal erythema surrounding edge of wound.   NEURO:  No new focal changes.  PSYCH: Mentation appears  Stable and c/w prior TBI.        Video-Visit Details    Type of service:  Video Visit    Video End Time:255PM    Originating Location (pt. Location): Home    Distant Location (provider location):  [facility]     Platform used for Video Visit: Outsmart

## 2021-04-27 LAB
ALBUMIN SERPL-MCNC: 4.3 G/DL (ref 3.4–5)
ALP SERPL-CCNC: 104 U/L (ref 40–150)
ALT SERPL W P-5'-P-CCNC: 27 U/L (ref 0–70)
AST SERPL W P-5'-P-CCNC: 11 U/L (ref 0–45)
BILIRUB DIRECT SERPL-MCNC: <0.1 MG/DL (ref 0–0.2)
BILIRUB SERPL-MCNC: 0.4 MG/DL (ref 0.2–1.3)
PROT SERPL-MCNC: 8.6 G/DL (ref 6.8–8.8)
VALPROATE SERPL-MCNC: 62 MG/L (ref 50–100)

## 2021-04-29 ENCOUNTER — VIRTUAL VISIT (OUTPATIENT)
Dept: PSYCHIATRY | Facility: CLINIC | Age: 41
End: 2021-04-29
Payer: COMMERCIAL

## 2021-04-29 DIAGNOSIS — G25.9 EXTRAPYRAMIDAL RIGIDITY: ICD-10-CM

## 2021-04-29 DIAGNOSIS — I46.9 CARDIAC ARREST (H): ICD-10-CM

## 2021-04-29 DIAGNOSIS — F34.81 DISRUPTIVE MOOD DYSREGULATION DISORDER (H): Primary | ICD-10-CM

## 2021-04-29 PROCEDURE — 99214 OFFICE O/P EST MOD 30 MIN: CPT | Mod: 95 | Performed by: NURSE PRACTITIONER

## 2021-04-29 RX ORDER — BENZTROPINE MESYLATE 0.5 MG/1
0.5 TABLET ORAL 2 TIMES DAILY
Qty: 60 TABLET | Refills: 1 | Status: SHIPPED | OUTPATIENT
Start: 2021-04-29 | End: 2021-05-26

## 2021-04-29 RX ORDER — OLANZAPINE 5 MG/1
5 TABLET ORAL
Qty: 60 TABLET | Refills: 1 | Status: SHIPPED | OUTPATIENT
Start: 2021-04-29 | End: 2021-05-26

## 2021-04-29 RX ORDER — QUETIAPINE FUMARATE 50 MG/1
50 TABLET, FILM COATED ORAL
Qty: 30 TABLET | Refills: 1 | Status: SHIPPED | OUTPATIENT
Start: 2021-04-29 | End: 2021-05-26

## 2021-04-29 RX ORDER — LORAZEPAM 0.5 MG/1
0.5 TABLET ORAL
Qty: 60 TABLET | Refills: 0 | Status: SHIPPED | OUTPATIENT
Start: 2021-04-29 | End: 2021-05-26

## 2021-04-29 RX ORDER — PAROXETINE 20 MG/1
20 TABLET, FILM COATED ORAL EVERY MORNING
Qty: 30 TABLET | Refills: 1 | Status: SHIPPED | OUTPATIENT
Start: 2021-04-29 | End: 2021-05-26

## 2021-04-29 RX ORDER — VALPROIC ACID 250 MG/5ML
500 SOLUTION ORAL 2 TIMES DAILY
Qty: 600 ML | Refills: 0 | Status: SHIPPED | OUTPATIENT
Start: 2021-04-29 | End: 2021-05-26

## 2021-04-29 NOTE — PROGRESS NOTES
"How would you like to obtain your AVS? MyChart  If the video visit is dropped, the invitation should be resent by: Text to cell phone: [phone]  Will anyone else be joining your video visit? Yes: parents. How would they like to receive their invitation? Send to e-mail at: [email or web address]      Location of patient:  Home   [address] [place] [id number]  Any new OTC medications: No  Recent height or weight: No  Recent BP/HR: No  Alcohol use:  No If yes, how many drinks per week: zero   Current other substance use (including Vaping):  Denies  Any updates with mental health (hospital stay, ER, etc) that Elsa should know about: No  Taking medications every day: Yes  If female: Birth control: No  What is the most important thing you would like addressed today: behaviors          Outpatient Psychiatric Progress Note    Name: Scot Bishop   : 1980                    Primary Care Provider: Cachorro Morales MD   Therapist: None    PHQ-9 scores:  No flowsheet data found.    CLEMENT-7 scores:  No flowsheet data found.    Patient Identification:    Patient is a 40 year old year old, single  White American male  who presents for return visit with me.  Patient is currently disabled. Patient attended the session with His father and stepmother , who they agreed to have interview with. Patient prefers to be called: \" Scot\".    Interim History:    I last saw Scot Bishop for outpatient psychiatry Return Visit on April 15, 2021.     During that appointment, he was reported to be experiencing an increase in aggressive asked towards caregivers and family members.  This has presented in the form of kicking, hitting, and biting.  It seems to be occurring when people are trying to provide hands-on care to him.  The Haldol will be discontinued and I will increase his valproic acid to 500 mg twice daily.  A Depakote panel has been ordered after a week of being on the medication.  He will continue the paroxetine " 20 mg daily for anxiety.  Seroquel is continued at 100 mg at 8 AM and 8 PM with 50 mg at 2 PM.  He will continue the trazodone at 50 mg at bedtime to help him sleep.  Lorazepam has been ordered twice daily at 0.5 mg for increased agitation and anxiety episodes. .     Current medications include: acetaminophen (TYLENOL) 325 MG tablet, Take 2 tablets (650 mg) by mouth every 6 hours as needed for mild pain or fever (Patient taking differently: Take 650 mg by mouth every 6 hours as needed for mild pain or fever VIA PEG-Tube)  amantadine (SYMMETREL) 50 MG/5ML syrup, 100 mg by Per G Tube route every 12 hours VIA PEG-Tube  amoxicillin-clavulanate (AUGMENTIN) 250-62.5 MG/5ML suspension,   aspirin (ASA) 81 MG chewable tablet, 1 tablet (81 mg) by Per Feeding Tube route daily  atorvastatin (LIPITOR) 40 MG tablet, 1 tablet (40 mg) by Per G Tube route At Bedtime Via PEG-tube  bacitracin 500 UNIT/GM OINT, Apply 1 Application topically 2 times daily  - Topical as needed  bacitracin-neomycin-polymyxin (NEOSPORIN) 400-5-5000 external ointment, Apply 1 Application topically 2 times daily Topical as needed  bromocriptine (PARLODEL) 2.5 MG tablet, 2.5 mg by Per G Tube route  chlorhexidine (PERIDEX) 0.12 % solution,   divalproex sodium delayed-release (DEPAKOTE SPRINKLE) 125 MG DR capsule, 500 mg  divalproex sodium delayed-release (DEPAKOTE SPRINKLE) 125 MG DR capsule,   Emollient (AQUAPHOR ADVANCED THERAPY) OINT, Apply topically 2 times daily As needed.  lisinopril (ZESTRIL) 2.5 MG tablet,   LORazepam (ATIVAN) 0.5 MG tablet, Take 1 tablet (0.5 mg) by mouth 2 times daily as needed for agitation or anxiety  metoclopramide (REGLAN) 5 MG tablet,   metoprolol tartrate (LOPRESSOR) 25 MG tablet, Take 0.5 tablets (12.5 mg) by mouth 2 times daily  mineral oil-hydrophilic petrolatum (AQUAPHOR) external ointment, Apply topically 2 times daily   nystatin (MYCOSTATIN) 155997 UNIT/GM external cream, Apply topically 2 times daily As  needed  omeprazole (PRILOSEC) 2 mg/mL suspension, 40 mg by Per Feeding Tube route every morning (before breakfast)   pantoprazole (PROTONIX) 40 MG EC tablet, Take 40 mg by mouth  PARoxetine (PAXIL) 20 MG tablet, 1 tablet (20 mg) by Oral or Feeding Tube route every morning  Pediatric Multiple Vit-C-FA (CHILDRENS CHEWABLE VITAMINS) CHEW, Take 1 tablet by mouth  Potassium Bicarb-Citric Acid 20 MEQ TBEF, 20 mEq by Tube route  potassium chloride (KAYCIEL) 20 MEQ/15ML (10%) solution,   potassium chloride (KLOR-CON) 20 MEQ packet, 20 mEq  QUEtiapine (SEROQUEL) 100 MG tablet,   QUEtiapine (SEROQUEL) 50 MG tablet, Two Tablets (100 mg) at 8 AM, one tablet (50 mg) at 2 PM, and Two Tablets (100 mg) at 8 PM via peg tube  ticagrelor (BRILINTA) 90 MG tablet, Take 1 tablet (90 mg) by mouth 2 times daily  traZODone (DESYREL) 50 MG tablet, 1 tablet (50 mg) by Per G Tube route At Bedtime  Valproate Sodium (VALPROIC ACID) 250 MG/5ML, 10 mLs (500 mg) by Per PEG tube route 2 times daily    No current facility-administered medications on file prior to visit.        The [place] Prescription Monitoring Program has been reviewed and there are no concerns about diversionary activity for controlled substances at this time.      I was able to review most recent Primary Care Provider, specialty provider, and therapy visit notes that I have access to.     Today, patient's parents and assisted living staff report that Scot is not sleeping through the night.  While he continues to have aggressive behaviors in the form of kicking, hitting, and being uncooperative with treatments, staff report his behaviors are not as long-lasting.  He has been taking the lorazepam twice daily on a regular basis.  Because of his sleep disruption at night he often sleeps throughout the day.  He continues to eat food but has started to spit it out.  He has episodes of verbal outbursts throughout the day.         has no past medical history on file.    Social history  updates:    Scot remains disabled.  He is nonverbal.    Substance use updates:    There is no alcohol use  Tobacco use: No    Vital Signs:   There were no vitals taken for this visit.    Labs:    Most recent laboratory results reviewed and no new labs.     Review of Systems:  10 systems (general, cardiovascular, respiratory, eyes, ENT, endocrine, GI, , M/S, neurological) were reviewed. Most pertinent finding(s) is/are: Nonverbal, involuntary muscle movements observed, some verbal outbursts of yelling, unable to tell me about his overall physical status.  He is able to ambulate with assistance.. The remaining systems are all unremarkable.    Mental Status Examination:  Appearance:  mild distress, Intermittent periods of wakefulness and somnolence and casually dressed  Attitude:  slightly uncooperative   Eye Contact:  fair  Gait and Station: No dizziness or falls and Uses wheelchair  Psychomotor Behavior:  fidgeting  Oriented to:  Person  Attention Span and Concentration:  Easily distracted  Speech:   mute  Mood:  Labile  Affect:  labile  Associations:  Unable to assess  Thought Process:  disorganized  Thought Content:  Unable to assess  Recent and Remote Memory:  poor Not formally assessed. No amnesia.  Fund of Knowledge: low-normal  Insight:  Poor  Judgment:  poor  Impulse Control:  poor    Suicide Risk Assessment:  Today Scot Bishop's parents and assisted living staff report that Scot has made no attempts to try to end his life. In addition, there are notable risk factors for self-harm, including comorbid medical condition of Brain anoxia and mood change. However, risk is mitigated by supportive family structure. Therefore, based on all available evidence including the factors cited above, Soct Bishop does not appear to be at imminent risk for self-harm, does not meet criteria for a 72-hr hold, and therefore remains appropriate for ongoing outpatient level of care.  A thorough assessment of risk  factors related to suicide and self-harm have been reviewed and are noted above. The patient convincingly denies suicidality on several occasions. Local community safety resources printed and reviewed for patient to use if needed. There was no deceit detected, and the patient presented in a manner that was believable.     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  Created by Conversion       Backache 04/16/2021     Priority: Medium     Formatting of this note might be different from the original.  Created by Conversion    Replacement Utility updated for latest IMO load       Thrombocytopenia, unspecified (H) 04/16/2021     Priority: Medium     Thrush, oral 12/09/2020     Priority: Medium     Advance care planning 12/04/2020     Priority: Medium     Formatting of this note might be different from the original.  [facility] was asked to see patient by inpatient palliative care on 11/23 for continuation of palliative care in a new setting.  Date of last visit: 12/9/2020    Formatting of this note is different from the original.  Advance Care Planning   Patient has identified Health Care Agent(s): Yes  Add Health Care Agents: Yes    Health Care Agent(s):  Guardian: Gerald Dario Relationship: father Phone: [phone]     Patient has Advance Care Plan Documents (Health Care Directive, POLST): Yes    Advance Care Plan Documents:  POLST Form     Patient has identified Specific Treatment Preferences: Yes     How have preferences been verified: POLST    Specific Treatment Preferences:   a.) Code Status:  DNR/ Do  Not Attempt Resuscitation - Allow a Natural Death  b.) Goals of Treatment:   ii. Selective Treatment: Use medical treatment, antibiotics, IV fluids and cardiac monitor as indicated. No intubation, advanced airway interventions, or mechanical ventilation. May consider less invasive airway support (e.g. CPAP, BiPAP). Transfer to hospital if indicated. Generally avoid the intensive care unit. All patients will receive comfort-focused treatments.  TREATMENT PLAN: Provide basic medical treatments aimed at treating new or reversible illness.  c.) Interventions and Treatments:  i.  Artificially Administered Nutrition and Hydration: - Long term artificial nutrion/hydration by tube through (not specified) (specify mouth/nose) and (not specified) (specify if ok with directly into GI tract)  ii.  Antibiotics: - Oral antibiotics only (NO IV/IM)       Acute respiratory failure with hypoxia (H) 12/04/2020     Priority: Medium     Nonverbal 11/11/2020     Priority: Medium     Gastrojejunostomy tube status (H) 11/11/2020     Priority: Medium     Cognitive disorder 11/11/2020     Priority: Medium     Aggression 11/09/2020     Priority: Medium     Agitation 09/21/2020     Priority: Medium     Yeast infection 09/03/2020     Priority: Medium     G tube feedings (H) 09/03/2020     Priority: Medium     Dysphagia 08/04/2020     Priority: Medium     Traumatic brain injury (H) 06/25/2020     Priority: Medium     Cardiac arrest (H) 05/17/2020     Priority: Medium     Back injury, sequela 10/27/2017     Priority: Medium     Formatting of this note might be different from the original.  WC Case w/ [employer] (www.accidentfund.iFood)  CLM #[id number];  Lydia Machuca [phone]         Assessment:    Scot LINCOLN Bishop is experiencing an increase in aggressive behaviors in the form of yelling, spitting food, and hitting staff.  He has been experiencing some muscle stiffness and involuntary movements and I added benztropine 0.5  mg twice daily.  His Depakote panel was within normal limits and it will continue at 500 mg twice daily per PEG tube.  Paroxetine will continue at 20 mg daily.  Trazodone was discontinued and olanzapine was added instead to help him sleep at night.  A dose of olanzapine was added in the morning to help with mood dysregulation that prohibits him from eating and engaging in therapies to strengthen him.  Lorazepam has been successful in helping him to redirect his behaviors to more positive directions and it will continue at 0.5 mg twice daily.  Quetiapine is being tapered off and he will only have 50 mg at 2 PM..    Medication side effects and alternatives were reviewed. Health promotion activities recommended and reviewed today. All questions addressed. Education and counseling completed regarding risks and benefits of medications and psychotherapy options.    Treatment Plan:         1.    Continue valproic acid  500 mg (10 ml) twice daily per PEG tube    2.    Add benztropine 0.5 mg twice daily     3.    Depakote panel :  WNL    4.    Continue paroxetine 20 mg daily    5.    Change quetiapine to 50 mg at 2 PM    6.    Discontinue trazodone    7.    Lorazepam 0.5 mg twice daily    8.    Add olanzapine 5 mg twice daily      Continue all other medications as reviewed per electronic medical record today.     Safety plan reviewed. To the Emergency Department as needed or call after hours crisis line at [phone] or [phone]. [place] Crisis Text Line. Text [place] to 299315 or Suicide LifeLine Chat: suicidepreventionlifeline.org/chat/    To schedule individual or family therapy, call [facility] at [phone].    Schedule an appointment with me in 2 weeks or sooner as needed. Call [facility] at [phone] to schedule.    Follow up with primary care provider as planned or for acute medical concerns.    Call the psychiatric nurse line with medication questions or concerns at  [phone].    MyChart may be used to communicate with your provider, but this is not intended to be used for emergencies.    Crisis Resources:    National Suicide Prevention Lifeline: 505.161.9216 (TTY: 780.495.3086). Call anytime for help.  (www.suicidepreventionlifeline.org)  National [place] on Mental Illness (www.melvin.org): 391.439.4459 or 171-060-3370.   Mental Health Association (www.mentalhealth.org): 273.439.5882 or 003-468-4313.  [place] Crisis Text [place]: Text [place] to 829092  Suicide LifeLine Chat: suicidepreHeartscapeline.org/chat    Administrative Billing:   Time spent with patient was 30 minutes and greater than 50% of time or 20 minutes was spent in counseling and coordination of care regarding above diagnoses and treatment plan.    Patient Status:  Patient will continue to be seen for ongoing consultation and stabilization.    Signed:   LASHAWN Jeong-BC   Psychiatry

## 2021-04-29 NOTE — PATIENT INSTRUCTIONS
1.    Continue valproic acid  500 mg (10 ml) twice daily per PEG tube    2.    Add benztropine 0.5 mg twice daily     3.    Depakote panel :  WNL    4.    Continue paroxetine 20 mg daily    5.    Change quetiapine to 50 mg at 2 PM    6.    Discontinue trazodone    7.    Lorazepam 0.5 mg twice daily    8.    Add olanzapine 5 mg twice daily      Continue all other medications as reviewed per electronic medical record today.     Safety plan reviewed. To the Emergency Department as needed or call after hours crisis line at [phone] or [phone]. [place] Crisis Text [place]. Text [place] to 875882 or Suicide LifeLine Chat: [employer].org/chat/    To schedule individual or family therapy, call [facility] at [phone].    Schedule an appointment with me in 2 weeks or sooner as needed. Call [facility] at [phone] to schedule.    Follow up with primary care provider as planned or for acute medical concerns.    Call the psychiatric nurse line with medication questions or concerns at [phone].    [employer] may be used to communicate with your provider, but this is not intended to be used for emergencies.    Crisis Resources:    National Suicide Prevention Lifeline: 416.125.1198 (TTY: 150.496.5098). Call anytime for help.  (www.suicidepreventionlifeline.org)  National [place] on Mental Illness (www.melvin.org): 696.343.6610 or 563-152-4217.   Mental Health Association (www.mentalhealth.org): 323.144.6508 or 157-317-6350.  [place] Crisis Text Line: Text [place] to 992701  Suicide LifeLine Chat: [employer].org/chat

## 2021-04-30 RX ORDER — ACETAMINOPHEN 325 MG/1
650 TABLET ORAL EVERY 6 HOURS PRN
Qty: 90 TABLET | Refills: 1 | OUTPATIENT
Start: 2021-04-30

## 2021-05-05 ENCOUNTER — MEDICAL CORRESPONDENCE (OUTPATIENT)
Dept: HEALTH INFORMATION MANAGEMENT | Facility: CLINIC | Age: 41
End: 2021-05-05

## 2021-05-05 DIAGNOSIS — I46.9 CARDIAC ARREST (H): ICD-10-CM

## 2021-05-06 RX ORDER — ACETAMINOPHEN 325 MG/1
650 TABLET ORAL EVERY 6 HOURS PRN
Qty: 90 TABLET | Refills: 1 | Status: SHIPPED | OUTPATIENT
Start: 2021-05-06 | End: 2021-05-12

## 2021-05-12 ENCOUNTER — TELEPHONE (OUTPATIENT)
Dept: INTERNAL MEDICINE | Facility: CLINIC | Age: 41
End: 2021-05-12

## 2021-05-12 DIAGNOSIS — I46.9 CARDIAC ARREST (H): ICD-10-CM

## 2021-05-12 DIAGNOSIS — K59.00 CONSTIPATION, UNSPECIFIED CONSTIPATION TYPE: Primary | ICD-10-CM

## 2021-05-12 RX ORDER — ACETAMINOPHEN 325 MG/1
325 TABLET ORAL EVERY 6 HOURS PRN
Qty: 90 TABLET | Refills: 0 | Status: SHIPPED | OUTPATIENT
Start: 2021-05-12 | End: 2022-07-15

## 2021-05-12 RX ORDER — DOCUSATE SODIUM 100 MG/1
100 CAPSULE, LIQUID FILLED ORAL 2 TIMES DAILY PRN
Qty: 180 CAPSULE | Refills: 0 | Status: SHIPPED | OUTPATIENT
Start: 2021-05-12 | End: 2022-07-15

## 2021-05-12 NOTE — TELEPHONE ENCOUNTER
Prescription for Colace sent to listed pharmacy.  Unclear on Tylenol discontinuation thus I have renewed medicine but reduced dose as as needed as dose appeared to be slightly more than recommended.

## 2021-05-12 NOTE — TELEPHONE ENCOUNTER
[facility] director calling states pt's stools have been hard for the past week and wondering if Dr. Morales will prescribe a stool softener.  Denies pain or blood with passing of stools.    Also wondering why the tylenol 325 mg was discontinued on 5/6/21?    [facility] can be reached at [phone].    Pharmacy pended.    Sonia KERNS RN  EP Triage

## 2021-05-19 ENCOUNTER — HOSPITAL ENCOUNTER (OUTPATIENT)
Dept: CARDIOLOGY | Facility: CLINIC | Age: 41
Discharge: HOME | End: 2021-05-19
Attending: INTERNAL MEDICINE | Admitting: INTERNAL MEDICINE
Payer: COMMERCIAL

## 2021-05-19 DIAGNOSIS — I50.21 ACUTE SYSTOLIC HEART FAILURE (H): ICD-10-CM

## 2021-05-19 DIAGNOSIS — I46.9 CARDIAC ARREST (H): ICD-10-CM

## 2021-05-19 DIAGNOSIS — I25.10 CORONARY ARTERY DISEASE DUE TO LIPID RICH PLAQUE: ICD-10-CM

## 2021-05-19 DIAGNOSIS — I25.83 CORONARY ARTERY DISEASE DUE TO LIPID RICH PLAQUE: ICD-10-CM

## 2021-05-19 PROCEDURE — 93306 TTE W/DOPPLER COMPLETE: CPT | Mod: 26 | Performed by: INTERNAL MEDICINE

## 2021-05-19 PROCEDURE — 93306 TTE W/DOPPLER COMPLETE: CPT

## 2021-05-20 ENCOUNTER — RECORDS - HEALTHEAST (OUTPATIENT)
Dept: ADMINISTRATIVE | Facility: OTHER | Age: 41
End: 2021-05-20

## 2021-05-20 ENCOUNTER — TELEPHONE (OUTPATIENT)
Dept: INTERNAL MEDICINE | Facility: CLINIC | Age: 41
End: 2021-05-20

## 2021-05-20 ENCOUNTER — HOSPITAL ENCOUNTER (OUTPATIENT)
Dept: RADIOLOGY | Facility: HOSPITAL | Age: 41
Discharge: HOME | End: 2021-05-20
Admitting: RADIOLOGY
Payer: COMMERCIAL

## 2021-05-20 ENCOUNTER — TRANSFERRED RECORDS (OUTPATIENT)
Dept: HEALTH INFORMATION MANAGEMENT | Facility: CLINIC | Age: 41
End: 2021-05-20

## 2021-05-20 DIAGNOSIS — Z93.4 GASTROJEJUNOSTOMY TUBE STATUS (H): Primary | ICD-10-CM

## 2021-05-20 DIAGNOSIS — S06.9XAA TBI (TRAUMATIC BRAIN INJURY) (H): ICD-10-CM

## 2021-05-20 NOTE — TELEPHONE ENCOUNTER
Per José Miguel, facility needs letter stating ok with below note. PCP please see letter. Make adjustments as needed.    Akanksha MILLERN, RN, PHN

## 2021-05-20 NOTE — LETTER
May 20, 2021        To whom it may concern,    Regarding: Scot Bishop  YOB: 1980    Ok to crush medications and put in applesauce as tolerated during oral intake. Ok to use G tube food as needed until nutritionist sees patient when patient refuses meals.        Sincerely,        Dr. Cachorro Morales

## 2021-05-20 NOTE — CONFIDENTIAL NOTE
Patient's group home coordinator called [facility] scheduling stating patient needs his G-tube replaced.     Imaging needs order.     Order Td'up. Please review, edit/ add and sign.    Akanksha BYERS, RN, PHN

## 2021-05-20 NOTE — TELEPHONE ENCOUNTER
Nutritionist coming out 6/21, tube replaced today, eating well. Can we make feeding tube food PRN until the nutritionist sees him? Sometimes refuses and then they can try later.   Can they get order to crush meds and put in applesauce taken orally and thru tube? Current order is just for tube. He is taking some foods orally.    [phone] José Miguel from [facility], secure for messages  Fax is [phone]

## 2021-05-26 ENCOUNTER — VIRTUAL VISIT (OUTPATIENT)
Dept: PSYCHIATRY | Facility: CLINIC | Age: 41
End: 2021-05-26
Payer: COMMERCIAL

## 2021-05-26 ENCOUNTER — VIRTUAL VISIT (OUTPATIENT)
Dept: CARDIOLOGY | Facility: CLINIC | Age: 41
End: 2021-05-26
Attending: INTERNAL MEDICINE
Payer: COMMERCIAL

## 2021-05-26 VITALS — SYSTOLIC BLOOD PRESSURE: 98 MMHG | HEART RATE: 97 BPM | TEMPERATURE: 97.8 F | DIASTOLIC BLOOD PRESSURE: 66 MMHG

## 2021-05-26 DIAGNOSIS — I50.21 ACUTE SYSTOLIC HEART FAILURE (H): ICD-10-CM

## 2021-05-26 DIAGNOSIS — F34.81 DISRUPTIVE MOOD DYSREGULATION DISORDER (H): ICD-10-CM

## 2021-05-26 DIAGNOSIS — I25.83 CORONARY ARTERY DISEASE DUE TO LIPID RICH PLAQUE: ICD-10-CM

## 2021-05-26 DIAGNOSIS — I46.9 CARDIAC ARREST (H): Primary | ICD-10-CM

## 2021-05-26 DIAGNOSIS — G25.9 EXTRAPYRAMIDAL RIGIDITY: ICD-10-CM

## 2021-05-26 DIAGNOSIS — I25.10 CORONARY ARTERY DISEASE DUE TO LIPID RICH PLAQUE: ICD-10-CM

## 2021-05-26 PROCEDURE — 99214 OFFICE O/P EST MOD 30 MIN: CPT | Mod: 95 | Performed by: NURSE PRACTITIONER

## 2021-05-26 PROCEDURE — 99213 OFFICE O/P EST LOW 20 MIN: CPT | Mod: 95 | Performed by: INTERNAL MEDICINE

## 2021-05-26 RX ORDER — VALPROIC ACID 250 MG/5ML
500 SOLUTION ORAL 2 TIMES DAILY
Qty: 600 ML | Refills: 1 | Status: SHIPPED | OUTPATIENT
Start: 2021-05-26 | End: 2021-07-14

## 2021-05-26 RX ORDER — PAROXETINE 20 MG/1
20 TABLET, FILM COATED ORAL EVERY MORNING
Qty: 30 TABLET | Refills: 1 | Status: SHIPPED | OUTPATIENT
Start: 2021-05-26 | End: 2021-07-08 | Stop reason: DRUGHIGH

## 2021-05-26 RX ORDER — QUETIAPINE FUMARATE 50 MG/1
50 TABLET, FILM COATED ORAL
Qty: 30 TABLET | Refills: 1 | Status: SHIPPED | OUTPATIENT
Start: 2021-05-26 | End: 2021-07-08

## 2021-05-26 RX ORDER — OLANZAPINE 5 MG/1
5 TABLET ORAL
Qty: 60 TABLET | Refills: 1 | Status: SHIPPED | OUTPATIENT
Start: 2021-05-26 | End: 2021-07-27

## 2021-05-26 RX ORDER — BENZTROPINE MESYLATE 0.5 MG/1
0.5 TABLET ORAL 2 TIMES DAILY
Qty: 60 TABLET | Refills: 1 | Status: SHIPPED | OUTPATIENT
Start: 2021-05-26 | End: 2021-07-27

## 2021-05-26 RX ORDER — LORAZEPAM 0.5 MG/1
0.5 TABLET ORAL
Qty: 60 TABLET | Refills: 0 | Status: SHIPPED | OUTPATIENT
Start: 2021-05-26 | End: 2021-06-28

## 2021-05-26 NOTE — LETTER
5/26/2021    Cachorro Morales MD  [address] [facility] [address]    RE: Scot LINCOLN Bishop       Dear Colleague,    I had the pleasure of seeing Scot Bishop in the [facility].    Scot is a 40 year old who is being evaluated via a billable video visit.      How would you like to obtain your AVS? MyChart  If the video visit is dropped, the invitation should be resent by: Send to e-mail at: [email or web address]  Will anyone else be joining your video visit? No       Review Of Systems  Skin: NEGATIVE  Eyes:Ears/Nose/Throat: NEGATIVE  Respiratory: NEGATIVE  Cardiovascular:NEGATIVE  Gastrointestinal: reflux, feeding tube  Genitourinary:matson   Musculoskeletal: NEGATIVE  Neurologic: TBI  Psychiatric: NEGATIVE  Hematologic/Lymphatic/Immunologic: NEGATIVE  Endocrine:  NEGATIVE  Vitals - Patient Reported  Systolic (Patient Reported): (n/a)  Diastolic (Patient Reported): (n/a)  Weight (Patient Reported): 78.5 kg (173 lb)  Pulse (Patient Reported): (n/a)  Guardian will get patients BP before visit      Telephone number of patient:justa Duarte will do visit # [phone]    Julia Reyes LPN      HPI:     This is a 40 year old male here for video visit from rehab facility with his father who is primary caregiver. He has PMH Hypoxic Brain Injury secondary to VF arrest with persistent moderate-severe encephalopathy, TBI, nonverbal, respiratory failure requiring trach placement, dysphagia (on tube feeding), cardiac arrest (s/p successful aspiration thrombectomy and PCI w/ NAZIA of proximal and mid LAD).     Hospitalized 11/11/20-11/27/20 at [facility] for increased agitation: infectious source negative. Neurology consulted and medications switched around. 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 Admission at [facility] 6/25/20-7/31/20. His initial episode was in May 2020.      Per his  records:     Hospitalized 5/17/20-6/25/20 for cardiac arrest: Pt reportedly had chest pain that started on 5/16/20. He was using Drano on his pipes at home and did not initially seek medical attention for CP and SOB since it said on the box that Drano can cause those symptoms. He took a shower and had syncopal episode after coming out of the shower. EMS was called; initial rhythm asystole,after several rounds CPR, --> PEA --> VF in the field. After multiple cycles of epinephrine had ROSC. He was intubated in the field. BP was 90s/60s as he was being transported from ED but he had recurrent cardiac arrest on arrival to Cath Lab. ECG showed ST elevation in aVR. He was promptly placed on VA ECMO. He had thrombotic occlusion of proximal LAD and underwent successful aspiration thrombectomy and PCI w/ NAZIA of proximal and mid LAD. Patient remained in NSR with rates in the 110-120's. Suspect ongoing tachycardia d/t evolving MI and post-arrest state. TTE 6/10 showed LVEF 36% with LAD territory akinesis, RV normal. Patient now on GDMT. 5/27, pt had 23 second run of VT, now ST with rare PVCs. ECMO 5/17-5/19, IABP 5/19-5/20, trach 6/8, PEG 6/9.      Has speech, physical and occupational therapy. Blood pressure has been low and occasional tachycardia. He is on low dose metoprolol and lisinopril.     Last echocardiogram reviewed:      Interpretation Summary  LVEF 36% based on biplane 2D tracing.  LAD territory akinesis.  Right ventricular function, chamber size, wall motion, and thickness are  normal.  The inferior vena cava is normal.  No pericardial effusion is present.  There has been no change.    We repeated echocardiogram which showed unchanged LVEF, 30-35%. He is improving somewhat in his mental status and now trying to speak. He is at rehab right now with his father present over video visit. No signs of fluid overload per nursing staff. They have not been checking BP frequently, perhaps twice a week.       ASSESSMENT/PLAN:     This is a 40 year old male s/p cardiac arrest (s/p successful aspiration thrombectomy and PCI w/ NAZIA of proximal and mid LAD) with chronic encephalopathy here for video follow up. Unable to obtain history and data gathered from father and health care workers at rehab facility.     We discussed (his father and I) long term management which includes medical optimization, monitoring for residual cardiac dysfunction, BP and HR control and reconditioning of the heart. His father is under a lot of stress right as caregiver and I can happily follow up with Scot every few months to ensure everything is under control. I hope with his adequate medical optimization his LV function improves but it may only improve somewhat.     I discussed ICD therapy which they will think about. Not at this time and we could also attempt ziopatch monitoring. Given low blood pressure I decreased metoprolol which may help his symptoms of agitation improve (better perfusion).      Follow up in 6 months.     Bibi Marcus MD MSc  [facility]      PAST MEDICAL HISTORY  History reviewed. No pertinent past medical history.    CURRENT MEDICATIONS  Current Outpatient Medications   Medication Sig Dispense Refill     acetaminophen (TYLENOL) 325 MG tablet Take 1 tablet (325 mg) by mouth every 6 hours as needed for mild pain or fever 90 tablet 0     amantadine (SYMMETREL) 50 MG/5ML syrup 10 mLs (100 mg) by Per G Tube route every 12 hours VIA PEG-Tube 600 mL 1     bacitracin 500 UNIT/GM OINT Apply 1 Application topically 2 times daily  - Topical as needed 15 g 1     bacitracin-neomycin-polymyxin (NEOSPORIN) 400-5-5000 external ointment Apply 1 Application topically 2 times daily Topical as needed 28.35 g 1     benztropine (COGENTIN) 0.5 MG tablet Take 1 tablet (0.5 mg) by mouth 2 times daily 60 tablet 1     bromocriptine (PARLODEL) 2.5 MG tablet 2.5 mg by Per G Tube route       chlorhexidine (PERIDEX) 0.12 % solution         docusate sodium (COLACE) 100 MG capsule Take 1 capsule (100 mg) by mouth 2 times daily as needed for constipation 180 capsule 0     Emollient (AQUAPHOR ADVANCED THERAPY) OINT Apply topically 2 times daily As needed. 20 g 1     lisinopril (ZESTRIL) 2.5 MG tablet        LORazepam (ATIVAN) 0.5 MG tablet Take 1 tablet (0.5 mg) by mouth 2 times daily 60 tablet 0     metoclopramide (REGLAN) 5 MG tablet        metoprolol tartrate (LOPRESSOR) 25 MG tablet Take 0.5 tablets (12.5 mg) by mouth 2 times daily 90 tablet 3     nystatin (MYCOSTATIN) 923600 UNIT/GM external cream Apply topically 2 times daily As needed 15 g 1     OLANZapine (ZYPREXA) 5 MG tablet Take 1 tablet (5 mg) by mouth 2 times daily 60 tablet 1     omeprazole (PRILOSEC) 2 mg/mL suspension 40 mg by Per Feeding Tube route every morning (before breakfast)        pantoprazole (PROTONIX) 40 MG EC tablet Take 40 mg by mouth       PARoxetine (PAXIL) 20 MG tablet 1 tablet (20 mg) by Oral or Feeding Tube route every morning 30 tablet 1     Pediatric Multiple Vit-C-FA (CHILDRENS CHEWABLE VITAMINS) CHEW Take 1 tablet by mouth       Potassium Bicarb-Citric Acid 20 MEQ TBEF 20 mEq by Tube route       potassium chloride (KAYCIEL) 20 MEQ/15ML (10%) solution        potassium chloride (KLOR-CON) 20 MEQ packet 20 mEq       QUEtiapine (SEROQUEL) 50 MG tablet Take 1 tablet (50 mg) by mouth daily at 2 pm 30 tablet 1     ticagrelor (BRILINTA) 90 MG tablet Take 1 tablet (90 mg) by mouth 2 times daily 180 tablet 3     Valproate Sodium (VALPROIC ACID) 250 MG/5ML 10 mLs (500 mg) by Per PEG tube route 2 times daily 600 mL 0     aspirin (ASA) 81 MG chewable tablet 1 tablet (81 mg) by Per Feeding Tube route daily 90 tablet 2     atorvastatin (LIPITOR) 40 MG tablet 1 tablet (40 mg) by Per G Tube route At Bedtime Via PEG-tube 90 tablet 2     mineral oil-hydrophilic petrolatum (AQUAPHOR) external ointment Apply topically 2 times daily          PAST SURGICAL HISTORY:  Past Surgical  History:   Procedure Laterality Date     CV CORONARY ANGIOGRAM N/A 5/17/2020    Procedure: Coronary Angiogram;  Surgeon: Chadd Newby MD;  Location:  HEART CARDIAC CATH LAB     CV EXTRACORPERAL MEMBRANE OXYGENATION N/A 5/17/2020    Procedure: Extracorporeal Membrance Oxygenation;  Surgeon: Chadd Newby MD;  Location: [facility] CARDIAC CATH LAB     CV INTRA-AORTIC BALLOON PUMP INSERTION N/A 5/17/2020    Procedure: Intra-Aortic Balloon Pump Insertion;  Surgeon: Chadd Newby MD;  Location:  HEART CARDIAC CATH LAB     CV PCI STENT DRUG ELUTING N/A 5/17/2020    Procedure: Percutaneous Coronary Intervention Stent Drug Eluting;  Surgeon: Chadd Newby MD;  Location:  HEART CARDIAC CATH LAB     IR GASTROSTOMY TUBE PERCUTANEOUS PLCMNT  6/9/2020     REMOVE EXTRACORPORAL MEMBRANE OXYGENATOR ADULT (OUTSIDE OR) N/A 5/19/2020    Procedure: ECMO Decannulation at Bedside;  Surgeon: Mariano Workman MD;  Location:  OR       ALLERGIES   No Known Allergies    FAMILY HISTORY  Family History   Problem Relation Age of Onset     Parkinsonism Father        SOCIAL HISTORY  Social History     Socioeconomic History     Marital status: Single     Spouse name: Not on file     Number of children: Not on file     Years of education: Not on file     Highest education level: Not on file   Occupational History     Not on file   Social Needs     Financial resource strain: Not on file     Food insecurity     Worry: Not on file     Inability: Not on file     Transportation needs     Medical: Not on file     Non-medical: Not on file   Tobacco Use     Smoking status: Former Smoker     Smokeless tobacco: Never Used   Substance and Sexual Activity     Alcohol use: Not Currently     Drug use: Not Currently     Sexual activity: Not Currently   Lifestyle     Physical activity     Days per week: Not on file     Minutes per session: Not on file     Stress: Not on file   Relationships     Social connections     Talks on  phone: Not on file     Gets together: Not on file     Attends Islam service: Not on file     Active member of club or organization: Not on file     Attends meetings of clubs or organizations: Not on file     Relationship status: Not on file     Intimate partner violence     Fear of current or ex partner: Not on file     Emotionally abused: Not on file     Physically abused: Not on file     Forced sexual activity: Not on file   Other Topics Concern     Parent/sibling w/ CABG, MI or angioplasty before 65F 55M? Not Asked   Social History Narrative     Not on file         EXAM:  There were no vitals taken for this visit.    Labs:  LIPID RESULTS:  Lab Results   Component Value Date    CHOL 170 06/12/2020    HDL 25 (L) 06/12/2020    LDL 94 06/12/2020    TRIG 255 (H) 06/12/2020    NHDL 145 (H) 06/12/2020       LIVER ENZYME RESULTS:  Lab Results   Component Value Date    AST 11 04/26/2021    ALT 27 04/26/2021       CBC RESULTS:  Lab Results   Component Value Date    WBC 7.7 04/26/2021    RBC 4.17 (L) 04/26/2021    HGB 13.1 (L) 04/26/2021    HCT 41.3 04/26/2021    MCV 99 04/26/2021    MCH 31.4 04/26/2021    MCHC 31.7 04/26/2021    RDW 12.7 04/26/2021     04/26/2021       BMP RESULTS:  Lab Results   Component Value Date     06/25/2020    POTASSIUM 3.8 06/25/2020    CHLORIDE 102 06/25/2020    CO2 24 06/25/2020    ANIONGAP 13 06/25/2020    GLC 98 06/25/2020    BUN 29 06/25/2020    CR 0.76 06/25/2020    GFRESTIMATED >90 06/25/2020    GFRESTBLACK >90 06/25/2020    TEN 9.7 06/25/2020        A1C RESULTS:  Lab Results   Component Value Date    A1C 5.2 05/25/2020         Video-Visit Details    Type of service:  Video Visit    Video End Time: 25 minutes    Originating Location (pt. Location): Home    Distant Location (provider location):  [facility]     Platform used for Video Visit: Albertina    Thank you for allowing me to participate in the care of your patient.      Sincerely,     Bibi  MD Angeline     [facility]    cc:   Bibi Marcus MD  [address] [place] [address]

## 2021-05-26 NOTE — PROGRESS NOTES
"How would you like to obtain your AVS? MyChart  If the video visit is dropped, the invitation should be resent by: Text to cell phone: [phone]  Will anyone else be joining your video visit? No      Location of patient:  [address]   [address] [address]  Any new OTC medications: No  Recent height or weight: Yes 172lb    Recent BP/HR: Yes 93/65 96  Alcohol use:  No If yes, how many drinks per week: zero   Current other substance use (including Vaping):  Denies  Any updates with mental health (hospital stay, ER, etc) that Elsa should know about: No  Taking medications every day: Yes  If female: Birth control: No  What is the most important thing you would like addressed today: recheck on medications, doing pretty good            Outpatient Psychiatric Progress Note    Name: Scot Bishop   : 1980                    Primary Care Provider: Cachorro Morales MD   Therapist: None    PHQ-9 scores:  No flowsheet data found.    CLEMENT-7 scores:  No flowsheet data found.    Patient Identification:    Patient is a 40 year old year old, single  White Not  or  male  who presents for return visit with me.  Patient is currently disabled. Patient attended the session with His father and stepmother as well as assisted living staff member , who they agreed to have interview with. Patient prefers to be called: \" Scot\".    Interim History:    I last saw cSot Bishop for outpatient psychiatry Return Visit on 2021.     During that appointment, he was reported to be experiencing an increase in aggressive behaviors in the form of yelling, spitting food, and hitting staff.  He has been experiencing some muscle stiffness and involuntary movements and I added benztropine 0.5 mg twice daily.  His Depakote panel was within normal limits and it will continue at 500 mg twice daily per PEG tube.  Paroxetine will continue at 20 mg daily.  Trazodone was discontinued and olanzapine was added " instead to help him sleep at night.  A dose of olanzapine was added in the morning to help with mood dysregulation that prohibits him from eating and engaging in therapies to strengthen him.  Lorazepam has been successful in helping him to redirect his behaviors to more positive directions and it will continue at 0.5 mg twice daily.  Quetiapine is being tapered off and he will only have 50 mg at 2 PM..    Current medications include: acetaminophen (TYLENOL) 325 MG tablet, Take 1 tablet (325 mg) by mouth every 6 hours as needed for mild pain or fever  amantadine (SYMMETREL) 50 MG/5ML syrup, 10 mLs (100 mg) by Per G Tube route every 12 hours VIA PEG-Tube  aspirin (ASA) 81 MG chewable tablet, 1 tablet (81 mg) by Per Feeding Tube route daily  atorvastatin (LIPITOR) 40 MG tablet, 1 tablet (40 mg) by Per G Tube route At Bedtime Via PEG-tube  bacitracin 500 UNIT/GM OINT, Apply 1 Application topically 2 times daily  - Topical as needed  bacitracin-neomycin-polymyxin (NEOSPORIN) 400-5-5000 external ointment, Apply 1 Application topically 2 times daily Topical as needed  benztropine (COGENTIN) 0.5 MG tablet, Take 1 tablet (0.5 mg) by mouth 2 times daily  bromocriptine (PARLODEL) 2.5 MG tablet, 2.5 mg by Per G Tube route  chlorhexidine (PERIDEX) 0.12 % solution,   docusate sodium (COLACE) 100 MG capsule, Take 1 capsule (100 mg) by mouth 2 times daily as needed for constipation  Emollient (AQUAPHOR ADVANCED THERAPY) OINT, Apply topically 2 times daily As needed.  lisinopril (ZESTRIL) 2.5 MG tablet,   LORazepam (ATIVAN) 0.5 MG tablet, Take 1 tablet (0.5 mg) by mouth 2 times daily  metoclopramide (REGLAN) 5 MG tablet,   metoprolol tartrate (LOPRESSOR) 25 MG tablet, Take 0.5 tablets (12.5 mg) by mouth 2 times daily  mineral oil-hydrophilic petrolatum (AQUAPHOR) external ointment, Apply topically 2 times daily   nystatin (MYCOSTATIN) 975999 UNIT/GM external cream, Apply topically 2 times daily As needed  OLANZapine (ZYPREXA) 5 MG  tablet, Take 1 tablet (5 mg) by mouth 2 times daily  omeprazole (PRILOSEC) 2 mg/mL suspension, 40 mg by Per Feeding Tube route every morning (before breakfast)   pantoprazole (PROTONIX) 40 MG EC tablet, Take 40 mg by mouth  PARoxetine (PAXIL) 20 MG tablet, 1 tablet (20 mg) by Oral or Feeding Tube route every morning  Pediatric Multiple Vit-C-FA (CHILDRENS CHEWABLE VITAMINS) CHEW, Take 1 tablet by mouth  Potassium Bicarb-Citric Acid 20 MEQ TBEF, 20 mEq by Tube route  potassium chloride (KAYCIEL) 20 MEQ/15ML (10%) solution,   potassium chloride (KLOR-CON) 20 MEQ packet, 20 mEq  QUEtiapine (SEROQUEL) 50 MG tablet, Take 1 tablet (50 mg) by mouth daily at 2 pm  ticagrelor (BRILINTA) 90 MG tablet, Take 1 tablet (90 mg) by mouth 2 times daily  Valproate Sodium (VALPROIC ACID) 250 MG/5ML, 10 mLs (500 mg) by Per PEG tube route 2 times daily    No current facility-administered medications on file prior to visit.        The [place] Prescription Monitoring Program has been reviewed and there are no concerns about diversionary activity for controlled substances at this time.      I was able to review most recent Primary Care Provider, specialty provider, and therapy visit notes that I have access to.     Today, patient reports that he has been doing well.  He is better with the staff.  He is cooperating with the staff.    He tries to participate in physical activities.  He has been relaxing his muscles to some extent. .  He has an appointment with nutritionist with potential to get off of the feeding tube.  Taking medications PO.  Scot watches television.  He has been gaining weight.  He weighs in the 170s.   Overall acts of aggression are less.  He is sleeping better.  Lavonne has started to say some words.     has no past medical history on file.    Social history updates:    Scot has the opportunity to visit with his 2 children.  He continues to live at the assisted living facility requiring total care.  He is incontinent  with some constipation.    Substance use updates:    No alcohol use  Tobacco use: No    Vital Signs:   There were no vitals taken for this visit.    Labs:    Most recent laboratory results reviewed and no new labs.     Review of Systems:  10 systems (general, cardiovascular, respiratory, eyes, ENT, endocrine, GI, , M/S, neurological) were reviewed. Most pertinent finding(s) is/are: Ambulatory with assistance, no shortness of breath, no skin rashes. The remaining systems are all unremarkable.    Mental Status Examination:  Appearance:  Awake and casually dressed  Attitude:  more cooperative   Eye Contact:  adequate  Gait and Station: No dizziness or falls and Uses wheelchair  Psychomotor Behavior:  fidgeting  Oriented to:  Person  Attention Span and Concentration:  Easily distracted  Speech:   mute  Mood:  better  Affect:  restricted range  Associations:  Unable to assess  Thought Process:  Unable to assess  Thought Content:  Unable to assess  Recent and Remote Memory:  poor Not formally assessed. No amnesia.  Fund of Knowledge: low-normal  Insight:  Poor  Judgment:  poor  Impulse Control:  poor    Suicide Risk Assessment:  Today Scot Bishop's parents and support staff report that Scot has made no attempts to end his life and has decreased inflicting harm on other people. In addition, there are notable risk factors for self-harm, including anxiety, comorbid medical condition of Traumatic brain injury and mood change. However, risk is mitigated by 24-hour supervision by skilled caregivers. Therefore, based on all available evidence including the factors cited above, Scot Bishop does not appear to be at imminent risk for self-harm, does not meet criteria for a 72-hr hold, and therefore remains appropriate for ongoing outpatient level of care.  A thorough assessment of risk factors related to suicide and self-harm have been reviewed and are noted above. The patient convincingly denies suicidality on  several occasions. Local community safety resources printed and reviewed for patient to use if needed. There was no deceit detected, and the patient presented in a manner that was believable.     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  Created by Conversion       Backache 04/16/2021     Priority: Medium     Formatting of this note might be different from the original.  Created by Conversion    Replacement Utility updated for latest IMO load       Thrombocytopenia, unspecified (H) 04/16/2021     Priority: Medium     Thrush, oral 12/09/2020     Priority: Medium     Advance care planning 12/04/2020     Priority: Medium     Formatting of this note might be different from the original.  [facility] was asked to see patient by inpatient palliative care on 11/23 for continuation of palliative care in a new setting.  Date of last visit: 12/9/2020    Formatting of this note is different from the original.  Advance Care Planning   Patient has identified Health Care Agent(s): Yes  Add Health Care Agents: Yes    Health Care Agent(s):  Guardian: Gerald Bishop Relationship: father Phone: [phone]     Patient has Advance Care Plan Documents (Health Care Directive, POLST): Yes    Advance Care Plan Documents:  POLST Form     Patient has identified Specific Treatment Preferences: Yes     How have preferences been verified: POLST    Specific Treatment Preferences:   a.) Code Status:  DNR/ Do Not Attempt Resuscitation - Allow a Natural Death  b.) Goals of Treatment:   ii. Selective Treatment: Use medical treatment, antibiotics, IV fluids and cardiac monitor as indicated. No intubation, advanced  airway interventions, or mechanical ventilation. May consider less invasive airway support (e.g. CPAP, BiPAP). Transfer to hospital if indicated. Generally avoid the intensive care unit. All patients will receive comfort-focused treatments.  TREATMENT PLAN: Provide basic medical treatments aimed at treating new or reversible illness.  c.) Interventions and Treatments:  i.  Artificially Administered Nutrition and Hydration: - Long term artificial nutrion/hydration by tube through (not specified) (specify mouth/nose) and (not specified) (specify if ok with directly into GI tract)  ii.  Antibiotics: - Oral antibiotics only (NO IV/IM)       Acute respiratory failure with hypoxia (H) 12/04/2020     Priority: Medium     Nonverbal 11/11/2020     Priority: Medium     Gastrojejunostomy tube status (H) 11/11/2020     Priority: Medium     Cognitive disorder 11/11/2020     Priority: Medium     Aggression 11/09/2020     Priority: Medium     Agitation 09/21/2020     Priority: Medium     Yeast infection 09/03/2020     Priority: Medium     G tube feedings (H) 09/03/2020     Priority: Medium     Dysphagia 08/04/2020     Priority: Medium     Traumatic brain injury (H) 06/25/2020     Priority: Medium     Cardiac arrest (H) 05/17/2020     Priority: Medium     Back injury, sequela 10/27/2017     Priority: Medium     Formatting of this note might be different from the original.  WC Case w/ [employer] (www[employer]fund[employer])  CLM #[id number];  Lydia Machuca [phone]         Assessment:    Scot Bishop appears to be making slight improvements in his demeanor was reported less incidence of aggression towards staff and himself.  He is participating more in therapies to strengthen him and his father will contact me to review other comments from the physical therapy staff regarding his muscle movements.  He was calm and responded to me when I called his name during this video visit.  Staff and his parents asked that  no medication changes being made for now.  Scot appears to be sleeping better..    Medication side effects and alternatives were reviewed. Health promotion activities recommended and reviewed today. All questions addressed. Education and counseling completed regarding risks and benefits of medications and psychotherapy options.    Treatment Plan:        1.    Continue valproic acid  500 mg (10 ml) twice daily per PEG tube    2.    Continue benztropine 0.5 mg twice daily     3.    Will await to see what physical therapy has to say about Scot's muscle tone before making other medication changes    4.    Continue paroxetine 20 mg daily    5.    Change quetiapine to 50 mg at 2 PM    6.    Continue amantadine 100 mg twice daily    7.    Lorazepam 0.5 mg twice daily    8.    Continue olanzapine 5 mg twice daily    9.  Review use of Colace for constipation with primary care provider      Continue all other medications as reviewed per electronic medical record today.     Safety plan reviewed. To the Emergency Department as needed or call after hours crisis line at [phone] or [phone]. [place] Crisis Text [place]. Text [place] to 683212 or Suicide LifeLine Chat: suicidepreventionlifeline.org/chat/    To schedule individual or family therapy, call [facility] at [phone].    Schedule an appointment with me on June 16 or sooner as needed. Call [facility] at [phone] to schedule.    Follow up with primary care provider as planned or for acute medical concerns.    Call the psychiatric nurse line with medication questions or concerns at [phone].    AcuFocushart may be used to communicate with your provider, but this is not intended to be used for emergencies.    Crisis Resources:    National Suicide Prevention Lifeline: 390.589.6308 (TTY: 369.949.5013). Call anytime for help.  (www.suicidepreventionlifeline.org)  National [place] on Mental Illness (www.melvin.org): 894.560.3063 or  740.395.8051.   Mental Health Association (www.mentalhealth.org): 309.102.1987 or 433-039-9576.  [place] Crisis Text Line: Text [place] to 447574  Suicide LifeLine Chat: suicidepreventionlifeline.org/chat    Administrative Billing:   Time spent with patient was 30 minutes and greater than 50% of time or 20 minutes was spent in counseling and coordination of care regarding above diagnoses and treatment plan.    Patient Status:  Patient will continue to be seen for ongoing consultation and stabilization.    Signed:   LASHAWN Jeong-BC   Psychiatry

## 2021-05-26 NOTE — PATIENT INSTRUCTIONS
1.    Continue valproic acid  500 mg (10 ml) twice daily per PEG tube    2.    Continue benztropine 0.5 mg twice daily     3.    Will await to see what physical therapy has to say about Scot's muscle tone before making other medication changes    4.    Continue paroxetine 20 mg daily    5.    Change quetiapine to 50 mg at 2 PM    6.    Continue amantadine 100 mg twice daily    7.    Lorazepam 0.5 mg twice daily    8.    Continue olanzapine 5 mg twice daily    9.  Review use of Colace for constipation with primary care provider      Continue all other medications as reviewed per electronic medical record today.     Safety plan reviewed. To the Emergency Department as needed or call after hours crisis line at [phone] or [phone]. [place] Crisis Text [place]. Text [place] to 273812 or Suicide LifeLine Chat: V[employer].org/chat/    To schedule individual or family therapy, call [facility] at [phone].    Schedule an appointment with me on June 16 or sooner as needed. Call [facility] at [phone] to schedule.    Follow up with primary care provider as planned or for acute medical concerns.    Call the psychiatric nurse line with medication questions or concerns at [phone].    HeartFlowt may be used to communicate with your provider, but this is not intended to be used for emergencies.    Crisis Resources:    National Suicide Prevention Lifeline: 519.351.1187 (TTY: 864.504.4039). Call anytime for help.  (www.suicidepreventionlifeline.org)  National [place] on Mental Illness (www.melvin.org): 982.192.9978 or 035-152-4734.   Mental Health Association (www.mentalhealth.org): 366.409.3181 or 561-107-4352.  [place] Crisis [place]: Text [place] to 612276  Suicide LifeLine Chat: [employer].org/chat

## 2021-05-26 NOTE — PROGRESS NOTES
Scot is a 40 year old who is being evaluated via a billable video visit.      How would you like to obtain your AVS? MyChart  If the video visit is dropped, the invitation should be resent by: Send to e-mail at: [email or web address]  Will anyone else be joining your video visit? No       Review Of Systems  Skin: NEGATIVE  Eyes:Ears/Nose/Throat: NEGATIVE  Respiratory: NEGATIVE  Cardiovascular:NEGATIVE  Gastrointestinal: reflux, feeding tube  Genitourinary:matson   Musculoskeletal: NEGATIVE  Neurologic: TBI  Psychiatric: NEGATIVE  Hematologic/Lymphatic/Immunologic: NEGATIVE  Endocrine:  NEGATIVE  Vitals - Patient Reported  Systolic (Patient Reported): (n/a)  Diastolic (Patient Reported): (n/a)  Weight (Patient Reported): 78.5 kg (173 lb)  Pulse (Patient Reported): (n/a)  Guardian will get patients BP before visit      Telephone number of patient:justa Duarte will do visit # [phone]    Julia Reyes LPN      HPI:     This is a 40 year old male here for video visit from rehab facility with his father who is primary caregiver. He has PMH Hypoxic Brain Injury secondary to VF arrest with persistent moderate-severe encephalopathy, TBI, nonverbal, respiratory failure requiring trach placement, dysphagia (on tube feeding), cardiac arrest (s/p successful aspiration thrombectomy and PCI w/ NAZIA of proximal and mid LAD).     Hospitalized 11/11/20-11/27/20 at [facility] for increased agitation: infectious source negative. Neurology consulted and medications switched around. 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 Admission at [facility] 6/25/20-7/31/20. His initial episode was in May 2020.      Per his records:     Hospitalized 5/17/20-6/25/20 for cardiac arrest: Pt reportedly had chest pain that started on 5/16/20. He was using Drano on his pipes at home and did not initially seek medical attention for CP and SOB since it said on the box that  ano can cause those symptoms. He took a shower and had syncopal episode after coming out of the shower. EMS was called; initial rhythm asystole,after several rounds CPR, --> PEA --> VF in the field. After multiple cycles of epinephrine had ROSC. He was intubated in the field. BP was 90s/60s as he was being transported from ED but he had recurrent cardiac arrest on arrival to Cath Lab. ECG showed ST elevation in aVR. He was promptly placed on VA ECMO. He had thrombotic occlusion of proximal LAD and underwent successful aspiration thrombectomy and PCI w/ NAZIA of proximal and mid LAD. Patient remained in NSR with rates in the 110-120's. Suspect ongoing tachycardia d/t evolving MI and post-arrest state. TTE 6/10 showed LVEF 36% with LAD territory akinesis, RV normal. Patient now on GDMT. 5/27, pt had 23 second run of VT, now ST with rare PVCs. ECMO 5/17-5/19, IABP 5/19-5/20, trach 6/8, PEG 6/9.      Has speech, physical and occupational therapy. Blood pressure has been low and occasional tachycardia. He is on low dose metoprolol and lisinopril.     Last echocardiogram reviewed:      Interpretation Summary  LVEF 36% based on biplane 2D tracing.  LAD territory akinesis.  Right ventricular function, chamber size, wall motion, and thickness are  normal.  The inferior vena cava is normal.  No pericardial effusion is present.  There has been no change.    We repeated echocardiogram which showed unchanged LVEF, 30-35%. He is improving somewhat in his mental status and now trying to speak. He is at rehab right now with his father present over video visit. No signs of fluid overload per nursing staff. They have not been checking BP frequently, perhaps twice a week.      ASSESSMENT/PLAN:     This is a 40 year old male s/p cardiac arrest (s/p successful aspiration thrombectomy and PCI w/ NAZIA of proximal and mid LAD) with chronic encephalopathy here for video follow up. Unable to obtain history and data gathered from father and  health care workers at rehab facility.     We discussed (his father and I) long term management which includes medical optimization, monitoring for residual cardiac dysfunction, BP and HR control and reconditioning of the heart. His father is under a lot of stress right as caregiver and I can happily follow up with Scot every few months to ensure everything is under control. I hope with his adequate medical optimization his LV function improves but it may only improve somewhat.     I discussed ICD therapy which they will think about. Not at this time and we could also attempt ziopatch monitoring. Given low blood pressure I decreased metoprolol which may help his symptoms of agitation improve (better perfusion).      Follow up in 6 months.     Bibi Marcus MD MSc  [facility] [place]      PAST MEDICAL HISTORY  History reviewed. No pertinent past medical history.    CURRENT MEDICATIONS  Current Outpatient Medications   Medication Sig Dispense Refill     acetaminophen (TYLENOL) 325 MG tablet Take 1 tablet (325 mg) by mouth every 6 hours as needed for mild pain or fever 90 tablet 0     amantadine (SYMMETREL) 50 MG/5ML syrup 10 mLs (100 mg) by Per G Tube route every 12 hours VIA PEG-Tube 600 mL 1     bacitracin 500 UNIT/GM OINT Apply 1 Application topically 2 times daily  - Topical as needed 15 g 1     bacitracin-neomycin-polymyxin (NEOSPORIN) 400-5-5000 external ointment Apply 1 Application topically 2 times daily Topical as needed 28.35 g 1     benztropine (COGENTIN) 0.5 MG tablet Take 1 tablet (0.5 mg) by mouth 2 times daily 60 tablet 1     bromocriptine (PARLODEL) 2.5 MG tablet 2.5 mg by Per G Tube route       chlorhexidine (PERIDEX) 0.12 % solution        docusate sodium (COLACE) 100 MG capsule Take 1 capsule (100 mg) by mouth 2 times daily as needed for constipation 180 capsule 0     Emollient (AQUAPHOR ADVANCED THERAPY) OINT Apply topically 2 times daily As needed. 20 g 1     lisinopril (ZESTRIL) 2.5 MG  tablet        LORazepam (ATIVAN) 0.5 MG tablet Take 1 tablet (0.5 mg) by mouth 2 times daily 60 tablet 0     metoclopramide (REGLAN) 5 MG tablet        metoprolol tartrate (LOPRESSOR) 25 MG tablet Take 0.5 tablets (12.5 mg) by mouth 2 times daily 90 tablet 3     nystatin (MYCOSTATIN) 387557 UNIT/GM external cream Apply topically 2 times daily As needed 15 g 1     OLANZapine (ZYPREXA) 5 MG tablet Take 1 tablet (5 mg) by mouth 2 times daily 60 tablet 1     omeprazole (PRILOSEC) 2 mg/mL suspension 40 mg by Per Feeding Tube route every morning (before breakfast)        pantoprazole (PROTONIX) 40 MG EC tablet Take 40 mg by mouth       PARoxetine (PAXIL) 20 MG tablet 1 tablet (20 mg) by Oral or Feeding Tube route every morning 30 tablet 1     Pediatric Multiple Vit-C-FA (CHILDRENS CHEWABLE VITAMINS) CHEW Take 1 tablet by mouth       Potassium Bicarb-Citric Acid 20 MEQ TBEF 20 mEq by Tube route       potassium chloride (KAYCIEL) 20 MEQ/15ML (10%) solution        potassium chloride (KLOR-CON) 20 MEQ packet 20 mEq       QUEtiapine (SEROQUEL) 50 MG tablet Take 1 tablet (50 mg) by mouth daily at 2 pm 30 tablet 1     ticagrelor (BRILINTA) 90 MG tablet Take 1 tablet (90 mg) by mouth 2 times daily 180 tablet 3     Valproate Sodium (VALPROIC ACID) 250 MG/5ML 10 mLs (500 mg) by Per PEG tube route 2 times daily 600 mL 0     aspirin (ASA) 81 MG chewable tablet 1 tablet (81 mg) by Per Feeding Tube route daily 90 tablet 2     atorvastatin (LIPITOR) 40 MG tablet 1 tablet (40 mg) by Per G Tube route At Bedtime Via PEG-tube 90 tablet 2     mineral oil-hydrophilic petrolatum (AQUAPHOR) external ointment Apply topically 2 times daily          PAST SURGICAL HISTORY:  Past Surgical History:   Procedure Laterality Date     CV CORONARY ANGIOGRAM N/A 5/17/2020    Procedure: Coronary Angiogram;  Surgeon: Chadd Newby MD;  Location:  HE[facility]ARD[facility] CATH LAB     CV EXTRACORPERAL MEMBRANE OXYGENATION N/A 5/17/2020    Procedure:  Extracorporeal Membrance Oxygenation;  Surgeon: Chadd Newby MD;  Location:  [facility]ATH [facility]     CV INTRA-AORTIC BALLOON PUMP INSERTION N/A 5/17/2020    Procedure: Intra-Aortic Balloon Pump Insertion;  Surgeon: Chadd Newby MD;  Location:  HE[facility] CATH LAB     CV PCI STENT DRUG ELUTING N/A 5/17/2020    Procedure: Percutaneous Coronary Intervention Stent Drug Eluting;  Surgeon: Chadd Newby MD;  Location:  HEART [facility]ARDI[facility] CATH LAB     IR GASTROSTOMY TUBE PERCUTANEOUS PLCMNT  6/9/2020     REMOVE EXTRACORPORAL MEMBRANE OXYGENATOR ADULT (OUTSIDE OR) N/A 5/19/2020    Procedure: ECMO Decannulation at Bedside;  Surgeon: Mariano Workman MD;  Location:  OR       ALLERGIES   No Known Allergies    FAMILY HISTORY  Family History   Problem Relation Age of Onset     Parkinsonism Father        SOCIAL HISTORY  Social History     Socioeconomic History     Marital status: Single     Spouse name: Not on file     Number of children: Not on file     Years of education: Not on file     Highest education level: Not on file   Occupational History     Not on file   Social Needs     Financial resource strain: Not on file     Food insecurity     Worry: Not on file     Inability: Not on file     Transportation needs     Medical: Not on file     Non-medical: Not on file   Tobacco Use     Smoking status: Former Smoker     Smokeless tobacco: Never Used   Substance and Sexual Activity     Alcohol use: Not Currently     Drug use: Not Currently     Sexual activity: Not Currently   Lifestyle     Physical activity     Days per week: Not on file     Minutes per session: Not on file     Stress: Not on file   Relationships     Social connections     Talks on phone: Not on file     Gets together: Not on file     Attends Congregation service: Not on file     Active member of club or organization: Not on file     Attends meetings of clubs or organizations: Not on file     Relationship status: Not on file      Intimate partner violence     Fear of current or ex partner: Not on file     Emotionally abused: Not on file     Physically abused: Not on file     Forced sexual activity: Not on file   Other Topics Concern     Parent/sibling w/ CABG, MI or angioplasty before 65F 55M? Not Asked   Social History Narrative     Not on file         EXAM:  There were no vitals taken for this visit.    Labs:  LIPID RESULTS:  Lab Results   Component Value Date    CHOL 170 06/12/2020    HDL 25 (L) 06/12/2020    LDL 94 06/12/2020    TRIG 255 (H) 06/12/2020    NHDL 145 (H) 06/12/2020       LIVER ENZYME RESULTS:  Lab Results   Component Value Date    AST 11 04/26/2021    ALT 27 04/26/2021       CBC RESULTS:  Lab Results   Component Value Date    WBC 7.7 04/26/2021    RBC 4.17 (L) 04/26/2021    HGB 13.1 (L) 04/26/2021    HCT 41.3 04/26/2021    MCV 99 04/26/2021    MCH 31.4 04/26/2021    MCHC 31.7 04/26/2021    RDW 12.7 04/26/2021     04/26/2021       BMP RESULTS:  Lab Results   Component Value Date     06/25/2020    POTASSIUM 3.8 06/25/2020    CHLORIDE 102 06/25/2020    CO2 24 06/25/2020    ANIONGAP 13 06/25/2020    GLC 98 06/25/2020    BUN 29 06/25/2020    CR 0.76 06/25/2020    GFRESTIMATED >90 06/25/2020    GFRESTBLACK >90 06/25/2020    TEN 9.7 06/25/2020        A1C RESULTS:  Lab Results   Component Value Date    A1C 5.2 05/25/2020         Video-Visit Details    Type of service:  Video Visit    Video End Time: 25 minutes    Originating Location (pt. Location): Home    Distant Location (provider location):  [facility]     Platform used for Video Visit: Albertina

## 2021-05-27 VITALS — HEART RATE: 102 BPM | SYSTOLIC BLOOD PRESSURE: 99 MMHG | DIASTOLIC BLOOD PRESSURE: 50 MMHG | TEMPERATURE: 99.1 F

## 2021-05-30 VITALS — WEIGHT: 219 LBS

## 2021-06-04 VITALS
HEART RATE: 111 BPM | WEIGHT: 173.4 LBS | TEMPERATURE: 97.5 F | DIASTOLIC BLOOD PRESSURE: 88 MMHG | BODY MASS INDEX: 27.99 KG/M2 | SYSTOLIC BLOOD PRESSURE: 98 MMHG

## 2021-06-04 VITALS
BODY MASS INDEX: 27.31 KG/M2 | HEART RATE: 86 BPM | TEMPERATURE: 97.2 F | WEIGHT: 169.2 LBS | SYSTOLIC BLOOD PRESSURE: 102 MMHG | DIASTOLIC BLOOD PRESSURE: 68 MMHG

## 2021-06-04 VITALS
WEIGHT: 167 LBS | BODY MASS INDEX: 26.95 KG/M2 | DIASTOLIC BLOOD PRESSURE: 64 MMHG | HEART RATE: 80 BPM | TEMPERATURE: 98.2 F | SYSTOLIC BLOOD PRESSURE: 98 MMHG

## 2021-06-04 VITALS
TEMPERATURE: 97 F | HEART RATE: 79 BPM | WEIGHT: 166 LBS | BODY MASS INDEX: 26.79 KG/M2 | DIASTOLIC BLOOD PRESSURE: 58 MMHG | SYSTOLIC BLOOD PRESSURE: 92 MMHG

## 2021-06-04 VITALS
DIASTOLIC BLOOD PRESSURE: 61 MMHG | BODY MASS INDEX: 28.41 KG/M2 | SYSTOLIC BLOOD PRESSURE: 96 MMHG | TEMPERATURE: 65 F | HEART RATE: 98 BPM | WEIGHT: 176 LBS

## 2021-06-04 VITALS
TEMPERATURE: 97 F | WEIGHT: 166 LBS | BODY MASS INDEX: 26.79 KG/M2 | SYSTOLIC BLOOD PRESSURE: 100 MMHG | HEART RATE: 85 BPM | DIASTOLIC BLOOD PRESSURE: 67 MMHG

## 2021-06-04 VITALS
TEMPERATURE: 98.1 F | WEIGHT: 167 LBS | DIASTOLIC BLOOD PRESSURE: 66 MMHG | HEART RATE: 99 BPM | SYSTOLIC BLOOD PRESSURE: 103 MMHG | BODY MASS INDEX: 26.95 KG/M2

## 2021-06-04 VITALS
BODY MASS INDEX: 27.28 KG/M2 | HEART RATE: 108 BPM | TEMPERATURE: 95.6 F | DIASTOLIC BLOOD PRESSURE: 62 MMHG | WEIGHT: 169 LBS | SYSTOLIC BLOOD PRESSURE: 99 MMHG

## 2021-06-04 VITALS
TEMPERATURE: 98.8 F | WEIGHT: 166 LBS | HEART RATE: 89 BPM | BODY MASS INDEX: 26.79 KG/M2 | SYSTOLIC BLOOD PRESSURE: 98 MMHG | DIASTOLIC BLOOD PRESSURE: 50 MMHG

## 2021-06-04 VITALS
BODY MASS INDEX: 27.15 KG/M2 | DIASTOLIC BLOOD PRESSURE: 74 MMHG | WEIGHT: 168.2 LBS | HEART RATE: 95 BPM | TEMPERATURE: 98 F | SYSTOLIC BLOOD PRESSURE: 106 MMHG

## 2021-06-04 VITALS
HEART RATE: 93 BPM | TEMPERATURE: 98.7 F | DIASTOLIC BLOOD PRESSURE: 67 MMHG | SYSTOLIC BLOOD PRESSURE: 98 MMHG | BODY MASS INDEX: 27.15 KG/M2 | WEIGHT: 168.2 LBS

## 2021-06-04 VITALS
TEMPERATURE: 96 F | BODY MASS INDEX: 28.08 KG/M2 | SYSTOLIC BLOOD PRESSURE: 108 MMHG | HEART RATE: 99 BPM | WEIGHT: 174 LBS | DIASTOLIC BLOOD PRESSURE: 64 MMHG

## 2021-06-04 VITALS
HEART RATE: 69 BPM | TEMPERATURE: 99.1 F | DIASTOLIC BLOOD PRESSURE: 60 MMHG | WEIGHT: 165.3 LBS | BODY MASS INDEX: 26.68 KG/M2 | SYSTOLIC BLOOD PRESSURE: 96 MMHG

## 2021-06-04 VITALS
TEMPERATURE: 98.5 F | WEIGHT: 166 LBS | BODY MASS INDEX: 26.79 KG/M2 | SYSTOLIC BLOOD PRESSURE: 98 MMHG | HEART RATE: 98 BPM | DIASTOLIC BLOOD PRESSURE: 60 MMHG

## 2021-06-04 VITALS
WEIGHT: 174 LBS | DIASTOLIC BLOOD PRESSURE: 52 MMHG | HEART RATE: 92 BPM | BODY MASS INDEX: 28.08 KG/M2 | SYSTOLIC BLOOD PRESSURE: 96 MMHG | TEMPERATURE: 97.1 F

## 2021-06-04 VITALS
WEIGHT: 168.4 LBS | BODY MASS INDEX: 27.18 KG/M2 | DIASTOLIC BLOOD PRESSURE: 57 MMHG | HEART RATE: 96 BPM | SYSTOLIC BLOOD PRESSURE: 99 MMHG | TEMPERATURE: 98.5 F

## 2021-06-04 VITALS
HEART RATE: 91 BPM | WEIGHT: 164.8 LBS | SYSTOLIC BLOOD PRESSURE: 101 MMHG | TEMPERATURE: 98.9 F | BODY MASS INDEX: 26.6 KG/M2 | DIASTOLIC BLOOD PRESSURE: 69 MMHG

## 2021-06-04 VITALS
SYSTOLIC BLOOD PRESSURE: 112 MMHG | BODY MASS INDEX: 35.35 KG/M2 | DIASTOLIC BLOOD PRESSURE: 74 MMHG | HEART RATE: 98 BPM | WEIGHT: 219 LBS | TEMPERATURE: 98.3 F

## 2021-06-04 VITALS
DIASTOLIC BLOOD PRESSURE: 59 MMHG | BODY MASS INDEX: 26.79 KG/M2 | SYSTOLIC BLOOD PRESSURE: 99 MMHG | WEIGHT: 166 LBS | TEMPERATURE: 97.2 F | HEART RATE: 89 BPM

## 2021-06-04 VITALS
BODY MASS INDEX: 27.99 KG/M2 | SYSTOLIC BLOOD PRESSURE: 101 MMHG | WEIGHT: 173.4 LBS | HEART RATE: 115 BPM | TEMPERATURE: 98.5 F | DIASTOLIC BLOOD PRESSURE: 65 MMHG

## 2021-06-05 VITALS
SYSTOLIC BLOOD PRESSURE: 98 MMHG | SYSTOLIC BLOOD PRESSURE: 102 MMHG | HEART RATE: 102 BPM | TEMPERATURE: 98.4 F | DIASTOLIC BLOOD PRESSURE: 64 MMHG | BODY MASS INDEX: 24.4 KG/M2 | OXYGEN SATURATION: 95 % | HEART RATE: 98 BPM | BODY MASS INDEX: 24.57 KG/M2 | DIASTOLIC BLOOD PRESSURE: 80 MMHG | TEMPERATURE: 98.1 F | WEIGHT: 152.2 LBS | WEIGHT: 151.2 LBS | RESPIRATION RATE: 18 BRPM

## 2021-06-05 VITALS
WEIGHT: 157.2 LBS | BODY MASS INDEX: 25.37 KG/M2 | TEMPERATURE: 98 F | DIASTOLIC BLOOD PRESSURE: 68 MMHG | SYSTOLIC BLOOD PRESSURE: 101 MMHG | HEART RATE: 108 BPM

## 2021-06-05 VITALS
BODY MASS INDEX: 25.5 KG/M2 | SYSTOLIC BLOOD PRESSURE: 100 MMHG | HEART RATE: 99 BPM | DIASTOLIC BLOOD PRESSURE: 73 MMHG | TEMPERATURE: 100.7 F | WEIGHT: 158 LBS

## 2021-06-05 VITALS
RESPIRATION RATE: 20 BRPM | OXYGEN SATURATION: 96 % | WEIGHT: 154.2 LBS | BODY MASS INDEX: 24.89 KG/M2 | HEART RATE: 99 BPM | TEMPERATURE: 98.3 F | SYSTOLIC BLOOD PRESSURE: 93 MMHG | DIASTOLIC BLOOD PRESSURE: 61 MMHG

## 2021-06-05 VITALS
HEART RATE: 74 BPM | SYSTOLIC BLOOD PRESSURE: 112 MMHG | TEMPERATURE: 98.3 F | WEIGHT: 153 LBS | BODY MASS INDEX: 24.69 KG/M2 | DIASTOLIC BLOOD PRESSURE: 64 MMHG

## 2021-06-05 VITALS
DIASTOLIC BLOOD PRESSURE: 73 MMHG | HEART RATE: 114 BPM | SYSTOLIC BLOOD PRESSURE: 106 MMHG | BODY MASS INDEX: 25.76 KG/M2 | WEIGHT: 159.6 LBS | TEMPERATURE: 98.3 F

## 2021-06-05 VITALS
TEMPERATURE: 98.3 F | BODY MASS INDEX: 24.69 KG/M2 | WEIGHT: 153 LBS | OXYGEN SATURATION: 96 % | HEART RATE: 74 BPM | RESPIRATION RATE: 18 BRPM | DIASTOLIC BLOOD PRESSURE: 64 MMHG | SYSTOLIC BLOOD PRESSURE: 112 MMHG

## 2021-06-05 VITALS
BODY MASS INDEX: 26.79 KG/M2 | HEART RATE: 100 BPM | SYSTOLIC BLOOD PRESSURE: 98 MMHG | TEMPERATURE: 97.1 F | WEIGHT: 166 LBS | DIASTOLIC BLOOD PRESSURE: 72 MMHG

## 2021-06-05 VITALS
TEMPERATURE: 99.7 F | WEIGHT: 159 LBS | DIASTOLIC BLOOD PRESSURE: 58 MMHG | BODY MASS INDEX: 25.66 KG/M2 | HEART RATE: 110 BPM | SYSTOLIC BLOOD PRESSURE: 97 MMHG

## 2021-06-05 VITALS
DIASTOLIC BLOOD PRESSURE: 66 MMHG | BODY MASS INDEX: 24.95 KG/M2 | HEART RATE: 100 BPM | TEMPERATURE: 98.5 F | WEIGHT: 154.6 LBS | SYSTOLIC BLOOD PRESSURE: 112 MMHG

## 2021-06-05 VITALS
DIASTOLIC BLOOD PRESSURE: 66 MMHG | SYSTOLIC BLOOD PRESSURE: 129 MMHG | TEMPERATURE: 99.1 F | BODY MASS INDEX: 25.37 KG/M2 | WEIGHT: 157.2 LBS | HEART RATE: 66 BPM

## 2021-06-05 VITALS
WEIGHT: 148.4 LBS | TEMPERATURE: 98 F | DIASTOLIC BLOOD PRESSURE: 76 MMHG | SYSTOLIC BLOOD PRESSURE: 135 MMHG | RESPIRATION RATE: 16 BRPM | OXYGEN SATURATION: 95 % | HEART RATE: 68 BPM | BODY MASS INDEX: 23.95 KG/M2

## 2021-06-07 ENCOUNTER — TELEPHONE (OUTPATIENT)
Dept: INTERNAL MEDICINE | Facility: CLINIC | Age: 41
End: 2021-06-07

## 2021-06-07 NOTE — TELEPHONE ENCOUNTER
[facility] now requests orders and shares plan of care/discharge summaries for some patients through [employer].  Please REPLY TO THIS MESSAGE OR ROUTE BACK TO THE AUTHOR in order to give authorization for orders when needed.  This is considered a verbal order, you will still receive a faxed copy of orders for signature.  Thank you for your assistance in improving collaboration for our patients.    ORDER    Patient continues to increase activity during therapy, progressing to needing less assist and less use of jessica. Family participates in therapy visits and mobility training. Anticipate patient will be able to continue make progress in mobility goals.    Continued home care PT visits 1x/week for 1 week and 2x/week for 8 weeks.  OT eval and treat  SLP eval and treat

## 2021-06-08 ENCOUNTER — MEDICAL CORRESPONDENCE (OUTPATIENT)
Dept: HEALTH INFORMATION MANAGEMENT | Facility: CLINIC | Age: 41
End: 2021-06-08

## 2021-06-08 NOTE — PROGRESS NOTES
"    Assessment:  This is a 36 y.o.male who presents for evaluation of a work-related injury of his back and right knee that occurred 2 days ago.      1. Low back strain, initial encounter  naproxen (NAPROSYN) 500 MG tablet   2. Contusion of right knee, initial encounter           Plan:   I have prescribed naproxen 500 mg to take twice a day with food, scheduled initially and then as needed as he improves.  He should apply ice to the knee in the back.  He declined a note for work and did not have paperwork to be signed.  I am glad to fill out any work-related paperwork that he might need.  If he does not recover and in particular if he continues to have pain, instability, etc. of the knee, he should follow up for reevaluation.        Subjective:  This is a 36 y.o.male who presents for evaluation of a workman's comp injury.  He works as a .  2 days ago he was moving a refrigerator on a henri across an [place] parking lot to a storage shed.  He was pulling the henri when he slipped on the ice.  He twisted his body to avoid having the refrigerator fall directly on him, but both the refrigerator and the henri hit his right knee.  Within 10 minutes his back \"was on fire\" with pain.  Within 30 minutes he was starting to have significant right knee pain.  It is a dull pain and has not been constant.  Almost the entire night following the incident he had numbness of his right leg.  The numbness has resolved but he has back and knee pain when sitting, driving, or standing up.  The best position is lying down when it does not hurt as much.  It is also better if he lies on his back than on his side.  He has no previous history of back or knee injuries.  When he got home from work on Tuesday he took Aleve for the pain.  He has not yet been back to work yesterday or today.      Objective:      Visit Vitals     /70 (Patient Site: Right Arm, Patient Position: Sitting, Cuff Size: Adult Large)     Pulse 88     " Wt 219 lb (99.3 kg)     History   Smoking Status     Current Every Day Smoker   Smokeless Tobacco     Not on file       Physical Exam:   He is alert in no apparent distress at rest although he does have some discomfort as he stands and moves to the exam table.  Full range of motion is intact in his back.  Straight leg raising is negative.  Deep tendon reflexes are normal.  The knee does not have any obvious deformity, edema, or erythema.  There is no bruising.  He does have some tenderness along the joint lines and over the patella.  Negative anterior and posterior drawer signs, no evidence of instability.  His gait is slightly antalgic favoring the right leg.        Current Outpatient Prescriptions on File Prior to Visit   Medication Sig Dispense Refill     LORazepam (ATIVAN) 1 MG tablet Take 1 tablet (1 mg total) by mouth 3 (three) times a day as needed for anxiety. 12 tablet 0     No current facility-administered medications on file prior to visit.            Jolene Mchugh M.D.      This note has been dictated using voice recognition software.  Any grammatical, typographical, or context distortions are unintentional and inherent to the software.

## 2021-06-09 ENCOUNTER — TELEPHONE (OUTPATIENT)
Dept: INTERNAL MEDICINE | Facility: CLINIC | Age: 41
End: 2021-06-09

## 2021-06-09 NOTE — TELEPHONE ENCOUNTER
Request continues Ot tx to address ADLs, cognitive skills  Scot has shown great progress in the last month as his medications have been adjusted he is more alert and begining to follow very simple directions, plan to continue OT for 8 weeks  Thank you, Ruby Silva OTRL

## 2021-06-10 NOTE — PROGRESS NOTES
[facility] Admission note      Patient: Scot Bishop  MRN: [id number]  Date of Service: 8/6/2020      [facility] [[id number]]  Reason for Visit     Chief Complaint   Patient presents with     Review Of Multiple Medical Conditions   Follow-up on multiple falls    Code Status     FULL CODE    Assessment     -Acute VF arrest secondary to acute ST SUKI  -Acute LAD occlusion with underlying history of coronary artery disease  -Hypoxic brain injury  - Severe dysphagia currently discharged on tube feeding  -Aspiration pneumonia currently resolved  -Acute hypoxic respiratory failure currently resolved  -History of lengthy stay in the hospital with multiple procedures including requiring intubation and ECMO support from 5 17-5 19 with tracheostomy and PEG placement.  -LOWELL currently resolved  -Persistent hypotension    Plan     Patient has been admitted to the TCU as a transfer from the hospital where he had a lengthy stay of more than 2 months.  . Unfortunately no significant change in condition has been noted.  He remains nonverbal and aphasic.  Any attempt at communication nonverbally with sign language or with gestures or with a writing board has been unsuccessful either.  He is tolerating his tube feeding well.  He continues to have some spontaneous movements of his legs and arms but otherwise does not follow commands.  Nursing is reporting multiple falls from the patient rolling out of bed and found on the floor they are eventually planning to put a mattress on the floor for him.  They have told me that no matter what intervention to do as soon as he leaves the room he is found on the floor again.  I did talk to the  and the plan is that he will be moving to a group home whenever appropriate  Blood pressures are frequently low and suspect autonomic instability.  He is on very low doses of his medications with hold parameters continue with his current care plan  Recheck labs  done in the DC were reviewed stable electrolytes and kidney function with no acute changes noted    History     Patient is a very pleasant 40 y.o. male who is admitted to TCU  Patient has been admitted after a very lengthy more than 60-day stay in the hospital.  He presented following PEA/VF arrest in the community.  He was admitted and noted to have anterior acute ST SUKI.  He had multiple episodes of cardiac arrest  He required ECMO support and was emergently taken for cardiac catheterization which showed a thrombotic occlusion of the LAD which was treated with aspiration thrombectomy and PCI with drug-eluting stent of the proximal LAD.  Currently he has been discharged on medical management.  His blood pressures remain low but otherwise he does not give any nonverbal signs of chest pain.  He seems to be breathing comfortably    He also suffered from hypoxic brain injury.  Initial CT of the head was negative but EEG shows severe diffuse encephalopathy without seizure or epilepsy.  Repeat CT did show multifocal acute/subacute cerebral infarcts.  And brain injury which is consistent with hypoxic brain injury.  No improvement noted in the TCU he continues to be pretty nonresponsive and nonverbal.  No attempt of communication in any form has been successful he does not make any eye contact.  He does not follow any verbal commands either communication using gestures writing board is not successful either    He was noted to have aspiration pneumonitis which was treated.  Subsequently due to profound dysphagia he had PEG tube placement done and initiated on tube feeding.  Unfortunately had recurrent emesis after PEG tube placement.  A CT of his chest abdomen pelvis was done which did not show any source for his emesis.  Zofran has been added for relief.  He is also on Protonix.  Tube feeding is going well  And did report that he had a large BM after almost 8 days    He had persistent fever in the hospital with elevated  white count.  He had extensive work-up done x-ray will did not show any findings CT was negative.  Repeat C. difficile was negative and UA negative he did undergo 7 days of Zosyn which she is currently done with.  He currently remains afebrile    He had multiple other hospital issues including hyper natremia with elevated sodiums.  Acute renal insufficiency as well as thrombocytosis.  He also underwent shock liver.  Currently has been discharged to the TCU for strengthening rehab and eventually will need placement  Unfortunately he is poorly participatory in therapy because of his hypoxic brain injury and noted to be unresponsive to any verbal or nonverbal stimuli    Past Medical History     Active Ambulatory (Non-Hospital) Problems    Diagnosis     Hypoxic brain injury (H)     Dysphagia     Cardiac arrest (H)     Primary Lymphadenitis     Nicotine Dependence     Backache     Past Medical History:   Diagnosis Date     Acute respiratory failure with hypoxia (H)      LOWELL (acute kidney injury) (H)      Cardiac arrest (H)      Dysphagia      HTN (hypertension)      Hypoxic ischemic encephalopathy      NSTEMI (non-ST elevated myocardial infarction) (H)        Past Social History     Reviewed, and he  reports that he has been smoking. He has never used smokeless tobacco. He reports current alcohol use. He reports that he does not use drugs.    Family History     Reviewed, and family history includes Diabetes in his maternal aunt; Lung cancer in his maternal grandfather; Other in his father.    Medication List   Post Discharge Medication Reconciliation Status: discharge medications reconciled, continue medications without change   Current Outpatient Medications on File Prior to Visit   Medication Sig Dispense Refill     acetaminophen (TYLENOL) 650 mg/20.3 mL Soln 650 mg every 6 (six) hours as needed. Via PEG-tube        amantadine HCL (SYMMETREL) 50 mg/5 mL solution 100 mg every 12 (twelve) hours. Via PEG-tube        aspirin 81 MG EC tablet 81 mg daily. Via PEG-tube       atorvastatin (LIPITOR) 40 MG tablet 40 mg at bedtime. Via PEG-tube       bromocriptine (PARLODEL) 2.5 mg tablet 2.5 mg 2 (two) times a day. Via PEG-tube       chlorhexidine (PERIDEX) 0.12 % solution Apply 15 mL to the mouth or throat 4 (four) times a day.       furosemide (LASIX) 10 mg/mL solution 10 mg every other day. Via PEG-tube, hold if SBP <90       levETIRAcetam (KEPPRA) 100 mg/mL solution 1,000 mg 2 (two) times a day. Via PEG-tube       lisinopriL (PRINIVIL,ZESTRIL) 2.5 MG tablet 1.25 mg daily. Via PEG-tube, hold if SBP <90       LORazepam (ATIVAN) 0.5 MG tablet 1 tablet (0.5 mg total) by G-tube route 4 (four) times a day. 120 tablet 0     metoclopramide (REGLAN) 5 MG tablet 5 mg 2 (two) times a day. Via PEG-tube       metoprolol tartrate (LOPRESSOR) 25 MG tablet 6.25 mg 2 (two) times a day. Via PEG-tube, hold if SBP <90, HR <60       neomycin-bacitracin-polymyxin B (NEOSPORIN) 3.5-400-5,000 mg-unit-unit OiPk ointment Apply 1 application topically 2 (two) times a day.       omeprazole (PRILOSEC) 2 mg/mL SusR suspension 40 mg daily before breakfast. Via PEG-tube       pantoprazole (PROTONIX) 40 MG tablet 40 mg daily. Via PEG-tube       potassium bicarb-citric acid 20 mEq TbEF 20 mEq daily. Via PEG-tube       QUEtiapine (SEROQUEL) 25 MG tablet 12.5 mg 3 (three) times a day. Via PEG-tube       ticagrelor (BRILINTA) 90 mg Tab 90 mg 2 (two) times a day. Via PEG-tube       traZODone (DESYREL) 50 MG tablet 50 mg at bedtime. Via PEG-tube       white petrolatum (AQUAPHOR ORIGINAL) 41 % Oint Apply 1 application topically 2 (two) times a day. Apply to feet/legs       No current facility-administered medications on file prior to visit.        Allergies     No Known Allergies    Review of Systems   A comprehensive review of 14 systems was done. Pertinent findings noted here and in history of present illness. All the rest negative.  Constitutional: Negative.   Negative for fever, chills, he has activity change, appetite change and fatigue.   HENT: Negative for congestion and facial swelling.    Eyes: Negative for photophobia, redness and visual disturbance.   Respiratory: Negative for cough and chest tightness.    Cardiovascular: Negative for chest pain, palpitations and leg swelling.   Gastrointestinal: Negative for nausea, diarrhea, constipation, blood in stool and abdominal distention.   Genitourinary: Negative.    Musculoskeletal: Negative.  Hard to assess he does move his legs and arms spontaneously but not to command  Skin: Negative.    Neurological: Negative for dizziness, tremors, syncope, weakness, light-headedness and headaches.   Nonresponsive  Hematological: Does not bruise/bleed easily.   Psychiatric/Behavioral: Negative.        Physical Exam     Recent Vitals 8/4/2020   Weight -   BP 98/66   Pulse 97   Temp 97.8   Temp src -   SpO2 -       Constitutional:  appears well-developed.   HEENT:  Normocephalic and atraumatic.  Eyes: Conjunctivae and EOM are normal. Pupils are equal, round, and reactive to light. No discharge.  No scleral icterus. Nose normal. Mouth/Throat: Oropharynx is clear and moist. No oropharyngeal exudate.    NECK: Normal range of motion. Neck supple. No JVD present. No tracheal deviation present. No thyromegaly present.   CARDIOVASCULAR: Normal rate, regular rhythm and intact distal pulses.  Exam reveals no gallop and no friction rub.  Systolic murmur present.  PULMONARY: Effort normal and breath sounds normal. No respiratory distress.No Wheezing or rales.  ABDOMEN: Soft. Bowel sounds are normal. No distension and no mass.  There is no tenderness. There is no rebound and no guarding. No HSM.  MUSCULOSKELETAL: Normal range of motion. No edema and no tenderness. Mild kyphosis, no tenderness.  Hard to assess as patient does not respond to verbal commands or follow them.  He seems to be spontaneously moving his arms and legs though  LYMPH NODES:  Has no cervical, supraclavicular, axillary and groin adenopathy.   NEUROLOGICAL: Alert and oriented to NONE. No cranial nerve deficit.  Normal muscle tone. Coordination normal.   He does not make any eye contact.  Patient is aphasic and does not talk.  Does not follow verbal commands either  GENITOURINARY: Deferred exam.  SKIN: Skin is warm and dry. No rash noted. No erythema. No pallor.   EXTREMITIES: No cyanosis, no clubbing, no edema. No Deformity.  PSYCHIATRIC: mood, affect and behavior is hard to assess because of noncommunication.      Lab Results     Hemoglobin 10.6.  White count was 11.5 with platelets of 336.  Electrolytes were normal.  Calcium 9.7 magnesium 2.2.  CR 0.76  4 days 119 AST 27 ALT 45  Recent Results (from the past 240 hour(s))   Basic Metabolic Panel   Result Value Ref Range    Sodium 138 136 - 145 mmol/L    Potassium 3.5 3.5 - 5.0 mmol/L    Chloride 104 98 - 107 mmol/L    CO2 22 22 - 31 mmol/L    Anion Gap, Calculation 12 5 - 18 mmol/L    Glucose 91 70 - 125 mg/dL    Calcium 9.7 8.5 - 10.5 mg/dL    BUN 13 8 - 22 mg/dL    Creatinine 0.82 0.70 - 1.30 mg/dL    GFR MDRD Af Amer >60 >60 mL/min/1.73m2    GFR MDRD Non Af Amer >60 >60 mL/min/1.73m2   HM2(CBC w/o Differential)   Result Value Ref Range    WBC 8.3 4.0 - 11.0 thou/uL    RBC 3.87 (L) 4.40 - 6.20 mill/uL    Hemoglobin 11.0 (L) 14.0 - 18.0 g/dL    Hematocrit 34.9 (L) 40.0 - 54.0 %    MCV 90 80 - 100 fL    MCH 28.4 27.0 - 34.0 pg    MCHC 31.5 (L) 32.0 - 36.0 g/dL    RDW 14.2 11.0 - 14.5 %    Platelets 296 140 - 440 thou/uL    MPV 11.5 8.5 - 12.5 fL   Magnesium   Result Value Ref Range    Magnesium 2.0 1.8 - 2.6 mg/dL       Imaging Results     Echocardiogram on 6/10/2020 shows LV ejection fraction of 36%.  LAD territory akinesis with right ventricular function chamber size wall movement and thickness are normal.    Coronary angiogram shows 100% stenosis of the proximal LAD lesion    CT of the head was negative    DARIO Vargas

## 2021-06-10 NOTE — PROGRESS NOTES
[facility]      NAME:  Scot Bishop             :  1980    MRN: [id number]    CODE STATUS:      FACILITY: [facility] [[id number]]       CHIEF COMPLAIN/REASON FOR VISIT:  Chief Complaint   Patient presents with     Review Of Multiple Medical Conditions     initiate care as new admit from acute care       HISTORY OF PRESENT ILLNESS: Scot Bishop is a 40 y.o. male being seen to initiate care on the TCU . He has no known medical past as per his EMR from [facility] prior to a NStemi in May 2020. At that juncture he was in cardiac cath lab and suffered a hypoxic brain injury, high temp and thrombotic event. He is seen in his room today. Non verbal and did not track with his eyes. He will make eye contact. He has repetitive movement while in bed. Moving legs and arms. Per nursing he  Slid from bed over week end, no apparent injuries. He will need ongoing rehab services with PT/OT/ST. Has Gtube de to his dysphagia.     No Known Allergies:     Current Outpatient Medications   Medication Sig     LORazepam (ATIVAN) 1 MG tablet Take 1 tablet (1 mg total) by mouth 3 (three) times a day as needed for anxiety.         REVIEW OF SYSTEMS:  Unable to verbalize due to aphasia    PHYSICAL EXAMINATION:  Vitals:    20 0639   BP: 98/66   Pulse: 97   Temp: 97.8  F (36.6  C)         GENERAL: Does not follow simple commands, makes eye contact, non verbal.  HEENT: Head is normocephalic with normal hair distribution. No evidence of trauma. Ears: No acute purulent discharge. Eyes: Conjunctivae pink with no scleral jaundice. Nose: Normal mucosa and septum. NECK: Supple with no cervical or supraclavicular lymphadenopathy. Trachea is midline, healing trach stoma  CHEST: No tenderness or deformity, no crepitus  LUNG: Clear to auscultation with good chest expansion. There are no crackles or wheezes, normal AP diameter.  BACK: No kyphosis of the thoracic spine. Symmetric, no curvature, ROM  normal, no CVA tenderness, no spinal tenderness   CVS: There is good S1  S2, rhythm is regular.  ABDOMEN: Globular and soft, nontender to palpation, non distended, no masses, no organomegaly, good bowel sounds, no rebound or guarding, no peritoneal signs. Gtube in place, wears an abdominal binder to prevent pulling  EXTREMITIES: Atraumatic. Full range of motion on both upper and lower extremities, there is no tenderness to palpation, no pedal edema, no cyanosis or clubbing, no calf tenderness, normal cap refill, no joint swelling.  SKIN: Warm and dry, no erythema noted, no rashes or lesions.  NEUROLOGICAL: The patient is oriented to person, place and time. Strength and sensation are grossly intact. Face is symmetric.          LABS:    Lab Results   Component Value Date    WBC 11.6 (H) 04/23/2015    HGB 15.4 04/23/2015    HCT 44.5 04/23/2015    MCV 91 04/23/2015     04/23/2015       Results for orders placed or performed during the hospital encounter of 04/23/15   Basic metabolic panel   Result Value Ref Range    Sodium 139 136 - 145 mmol/L    Potassium 3.9 3.5 - 5.0 mmol/L    Chloride 108 (H) 98 - 107 mmol/L    CO2 22 22 - 31 mmol/L    Anion Gap, Calculation 9 5 - 18 mmol/L    Glucose 94 70 - 125 mg/dL    Calcium 9.3 8.5 - 10.5 mg/dL    BUN 8 8 - 22 mg/dL    Creatinine 0.81 0.70 - 1.30 mg/dL    GFR MDRD Af Amer >60 >60 mL/min/1.73m2    GFR MDRD Non Af Amer >60 >60 mL/min/1.73m2           No results found for: HGBA1C  No results found for: [id number]  No results found for: [id number]    ASSESSMENT/PLAN:  1. Cardiac arrest (H)    2. Hypoxic brain injury (H)    3. Dysphagia, unspecified type      1. Cardiac arrest with hypoxic brain injury: Will be in TCU for rehabilitation, PT/OT/SW/dITAARY TO FOLLOW. SWfor dc planning as well as SN for GTube management and chronic medical conditions.    3. Dysphagia: All nutrition via Gtube, all meds thru Gtube.  St to work with pt.      Electronically signed by:  Fouzia  XIMENA High  This progress note was completed using Dragon software and there may be grammatical errors.      45  minutes spent of which greater than 60 % was face to face communication with the patient and RN on duty  about above plan of care including TCU routines and reviewing med regme

## 2021-06-10 NOTE — PROGRESS NOTES
[facility]  note      Patient: Scot Bishop  MRN: [id number]  Date of Service: 8/27/2020      [facility] [[id number]]  Reason for Visit     Chief Complaint   Patient presents with     Review Of Multiple Medical Conditions   Follow-up on fever /low bp    Code Status     FULL CODE    Assessment     -Low-grade temperatures of unclear etiology  -Persistent hypotension low blood pressures noted  -Acute VF arrest secondary to acute ST SUKI  -Acute LAD occlusion with underlying history of coronary artery disease  -Hypoxic brain injury  - Severe dysphagia currently discharged on tube feeding; now with staff reporting patient constantly pulling tube feeding out  -Aspiration pneumonia currently resolved  -Acute hypoxic respiratory failure currently resolved  -History of lengthy stay in the hospital with multiple procedures including requiring intubation and ECMO support from 5 17-5 19 with tracheostomy and PEG placement.  -LOWELL currently resolved  -Suspected dehydration due to patient refusing to feeding    Plan     Patient has been admitted to the TCU as a transfer from the hospital where he had a lengthy stay of more than 2 months.  He has been on medical management and remains chest pain-free.  He is tolerating his meds well but is limited by low blood pressures.  He has been on tube feeding since admission.  Unfortunately he is going to be nowhere close to starting eating trials.  He has no interest and does not follow commands either.  He is currently on bolus feeding  via tube and that seems to be going well.  No nausea reported by nurses no emesis either.  He continues to fall and has been placed on a modified bed to minimize injuries due to the falls.  He has had multiple episodes of low-grade temperature which are being monitored closely these are suspected to be due to autonomic insufficiency today he is afebrile.  Blood pressures remain frequently low.  He is awaiting placement in a  group home      History     Patient is a very pleasant 40 y.o. male who is admitted to TCU  Patient has been admitted after a very lengthy more than 60-day stay in the hospital.  He presented following PEA/VF arrest in the community.  He was admitted and noted to have anterior acute ST SUKI.  He had multiple episodes of cardiac arrest  He required ECMO support and was emergently taken for cardiac catheterization which showed a thrombotic occlusion of the LAD which was treated with aspiration thrombectomy and PCI with drug-eluting stent of the proximal LAD.  He is on medical management.  Unfortunately ability to tolerate medication is limited due to low blood pressures.  He has not been complaining of any chest pain or discomfort    He also suffered from hypoxic brain injury.  Initial CT of the head was negative but EEG shows severe diffuse encephalopathy without seizure or epilepsy.  Repeat CT did show multifocal acute/subacute cerebral infarcts.  And brain injury which is consistent with hypoxic brain injury.  No improvement noted in the TCU he continues to be pretty nonresponsive and nonverbal.    Therapy is reporting some improvement in participation but nursing reports that he is doing the same behaviors as before with no improvement noted  He remains pretty much unresponsive.  No communication obtained.    He remains on tube feeding.  Recently has had a lot of difficulties with tube feeding.  He was sent him back in the hospital for G-tube replacement.  Due to his repeated difficulty with G-tube he has been switched from 24/7 feeding to bolus feeding during the daytime which has worked much better.  All his meds are given via tube as he does not swallow well either  His binder has been removed and his PEG tube insertion site seems to be healed up nicely with no drainage noted however he does have excoriation of the skin with scabbing noted with nursing believes is secondary to his binder rubbing against his  skin    He has had multiple falls in the TCU.  Placement efforts have been unsuccessful and finally has been transferred to mattress on the floor.  Unfortunately remains hard to care for in light of his profound cognitiv impairment.  He was having intermittent fevers and was being monitored it is suspected to be due to autonomic insufficiency recheck temperatures have been normal though localizing symptoms of any temperature been obtained either          Past Medical History     Active Ambulatory (Non-Hospital) Problems    Diagnosis     Impetigo any site     Hypoxic brain damage (H)     Acute respiratory failure with hypoxia (H)     Gastrojejunostomy tube status (H)     Hypoxic brain injury (H)     Dysphagia     Cardiac arrest (H)     Primary Lymphadenitis     Nicotine Dependence     Backache     Past Medical History:   Diagnosis Date     Acute respiratory failure with hypoxia (H)      LOWELL (acute kidney injury) (H)      Cardiac arrest (H)      Dysphagia      HTN (hypertension)      Hypoxic ischemic encephalopathy      NSTEMI (non-ST elevated myocardial infarction) (H)        Past Social History     Reviewed, and he  reports that he has been smoking. He has never used smokeless tobacco. He reports current alcohol use. He reports that he does not use drugs.    Family History     Reviewed, and family history includes Diabetes in his maternal aunt; Lung cancer in his maternal grandfather; Other in his father.    Medication List   Post Discharge Medication Reconciliation Status: discharge medications reconciled, continue medications without change   Current Outpatient Medications on File Prior to Visit   Medication Sig Dispense Refill     acetaminophen (TYLENOL) 650 mg/20.3 mL Soln 650 mg every 6 (six) hours as needed. Via PEG-tube        amantadine HCL (SYMMETREL) 50 mg/5 mL solution 100 mg every 12 (twelve) hours. Via PEG-tube       aspirin 81 MG EC tablet 81 mg daily. Via PEG-tube       atorvastatin (LIPITOR) 40 MG  tablet 40 mg at bedtime. Via PEG-tube       bromocriptine (PARLODEL) 2.5 mg tablet 2.5 mg 2 (two) times a day. Via PEG-tube       chlorhexidine (PERIDEX) 0.12 % solution Apply 15 mL to the mouth or throat 4 (four) times a day.       furosemide (LASIX) 10 mg/mL solution 10 mg every other day. Via PEG-tube, hold if SBP <90       levETIRAcetam (KEPPRA) 100 mg/mL solution 1,000 mg 2 (two) times a day. Via PEG-tube       lisinopriL (PRINIVIL,ZESTRIL) 2.5 MG tablet 1.25 mg daily. Via PEG-tube, hold if SBP <90       LORazepam (ATIVAN) 0.5 MG tablet 1 tablet (0.5 mg total) by G-tube route 4 (four) times a day. 120 tablet 0     metoclopramide (REGLAN) 5 MG tablet 5 mg 2 (two) times a day. Via PEG-tube       metoprolol tartrate (LOPRESSOR) 25 MG tablet 6.25 mg 2 (two) times a day. Via PEG-tube, hold if SBP <90, HR <60       neomycin-bacitracin-polymyxin B (NEOSPORIN) 3.5-400-5,000 mg-unit-unit OiPk ointment Apply 1 application topically 2 (two) times a day.       omeprazole (PRILOSEC) 2 mg/mL SusR suspension 40 mg daily before breakfast. Via PEG-tube       pantoprazole (PROTONIX) 40 MG tablet 40 mg daily. Via PEG-tube       potassium bicarb-citric acid 20 mEq TbEF 20 mEq daily. Via PEG-tube       QUEtiapine (SEROQUEL) 25 MG tablet 12.5 mg 3 (three) times a day. Via PEG-tube       ticagrelor (BRILINTA) 90 mg Tab 90 mg 2 (two) times a day. Via PEG-tube       traZODone (DESYREL) 50 MG tablet 50 mg at bedtime. Via PEG-tube       white petrolatum (AQUAPHOR ORIGINAL) 41 % Oint Apply 1 application topically 2 (two) times a day. Apply to feet/legs       No current facility-administered medications on file prior to visit.        Allergies     No Known Allergies    Review of Systems   A comprehensive review of 14 systems was done. Pertinent findings noted here and in history of present illness. All the rest negative.  Constitutional: Negative.  Negative for fever, chills, he has activity change, appetite change and fatigue.   He has  now been walking and taking a few steps.  He has been talking also  HENT: Negative for congestion and facial swelling.    Eyes: Negative for photophobia, redness and visual disturbance.   Respiratory: Negative for cough and chest tightness.    Cardiovascular: Negative for chest pain, palpitations and leg swelling.   Gastrointestinal: Negative for nausea, diarrhea, constipation, blood in stool and abdominal distention.   Genitourinary: Negative.    Musculoskeletal: Negative.  Hard to assess he does move his legs and arms spontaneously but not to command  Skin: Negative.    Neurological: Negative for dizziness, tremors, syncope, weakness, light-headedness and headaches.   Constant scissoring like movement of his legs and arms noted.  Significant excoriation of the right hip area noted  Pulling his tube feeding out  Nonresponsive  Hematological: Does not bruise/bleed easily.   Psychiatric/Behavioral: Negative.  Unable to assess as patient does not cooperate  However today noted to be talking spontaneously  Staff reporting that he is not sleeping well.  He has significant anxiety and frequently rolls out of bed      Physical Exam     Recent Vitals 8/26/2020   Weight 174 lbs   BP 96/52   Pulse 92   Temp 97.1   Temp src -   Some recent data might be hidden   Weight is 174 pounds blood pressure 100 /61 temp 98 pulse 80    Constitutional:  appears well-developed.   HEENT:  Normocephalic and atraumatic.  Eyes: Conjunctivae and EOM are normal. Pupils are equal, round, and reactive to light. No discharge.  No scleral icterus. Nose normal. Mouth/Throat: Oropharynx is clear and moist. No oropharyngeal exudate.    NECK: Normal range of motion. Neck supple. No JVD present. No tracheal deviation present. No thyromegaly present.   CARDIOVASCULAR: Normal rate, regular rhythm and intact distal pulses.  Exam reveals no gallop and no friction rub.  Systolic murmur present.  PULMONARY: Effort normal and breath sounds normal. No  respiratory distress.No Wheezing or rales.  ABDOMEN: Soft. Bowel sounds are normal. No distension and no mass.  There is no tenderness. There is no rebound and no guarding. No HSM.  Does have a G-tube which is somewhat sore around the insertion site  MUSCULOSKELETAL: Normal range of motion. No edema and no tenderness. Mild kyphosis, no tenderness.  Hard to assess as patient does not respond to verbal commands or follow them.  He seems to be spontaneously moving his arms and legs though  LYMPH NODES: Has no cervical, supraclavicular, axillary and groin adenopathy.   NEUROLOGICAL: Alert and oriented to NONE. No cranial nerve deficit.  Normal muscle tone. Coordination normal.   He does not make any eye contact.  Does not follow verbal commands either  GENITOURINARY: Deferred exam.  SKIN: Skin is warm and dry. No rash noted. No erythema. No pallor.   Excoriation of the skin in the abdomen noted  EXTREMITIES: No cyanosis, no clubbing, no edema. No Deformity.  PSYCHIATRIC: mood, affect and behavior is hard to assess because of noncommunication.      Lab Results     Hemoglobin 10.6.  White count was 11.5 with platelets of 336.  Electrolytes were normal.  Calcium 9.7 magnesium 2.2.  CR 0.76  4 days 119 AST 27 ALT 45  Recent Results (from the past 240 hour(s))   Basic Metabolic Panel   Result Value Ref Range    Sodium 141 136 - 145 mmol/L    Potassium 4.0 3.5 - 5.0 mmol/L    Chloride 104 98 - 107 mmol/L    CO2 25 22 - 31 mmol/L    Anion Gap, Calculation 12 5 - 18 mmol/L    Glucose 90 70 - 125 mg/dL    Calcium 9.5 8.5 - 10.5 mg/dL    BUN 12 8 - 22 mg/dL    Creatinine 0.81 0.70 - 1.30 mg/dL    GFR MDRD Af Amer >60 >60 mL/min/1.73m2    GFR MDRD Non Af Amer >60 >60 mL/min/1.73m2       Imaging Results     Echocardiogram on 6/10/2020 shows LV ejection fraction of 36%.  LAD territory akinesis with right ventricular function chamber size wall movement and thickness are normal.    Coronary angiogram shows 100% stenosis of the  proximal LAD lesion    CT of the head was negative    DARIO Vargas

## 2021-06-10 NOTE — PROGRESS NOTES
[facility]      NAME:  Scot Bishop             :  1980    MRN: [id number]    CODE STATUS:      FACILITY: [facility] [[id number]]       CHIEF COMPLAIN/REASON FOR VISIT:  Chief Complaint   Patient presents with     Problem Visit     impetigo       HISTORY OF PRESENT ILLNESS: Scot Bishop is a 40 y.o. male being seen per nursing request for abdominal skin impairment. Started on abx this week for impetigo. .  Dietary also changed to bolus feedings.  He has no known medical past as per his EMR from [facility] prior to a NStemi in May 2020. At that juncture he was in cardiac cath lab and suffered a hypoxic brain injury, high temp and thrombotic event. He is seen in his room today. Non verbal and did not track with his eyes. He will make eye contact. He has repetitive movement while in bed.He has repetitive laughing today. He varies between crying and laughing per nursing.  Moving legs and arms. Per nursing he  Slid from bed over week end, no apparent injuries, they are placing a scoop mattress for preventive falls. . He will need ongoing rehab services with PT/OT/ST. Has Gtube de to his dysphagia. Unfortunealy due to Scot Hypoxic brain injury he will not be able to understand hygiene education and nursing will need to meet these needs for pt.    No Known Allergies:     Current Outpatient Medications   Medication Sig     acetaminophen (TYLENOL) 650 mg/20.3 mL Soln 650 mg every 6 (six) hours as needed. Via PEG-tube      amantadine HCL (SYMMETREL) 50 mg/5 mL solution 100 mg every 12 (twelve) hours. Via PEG-tube     aspirin 81 MG EC tablet 81 mg daily. Via PEG-tube     atorvastatin (LIPITOR) 40 MG tablet 40 mg at bedtime. Via PEG-tube     bromocriptine (PARLODEL) 2.5 mg tablet 2.5 mg 2 (two) times a day. Via PEG-tube     chlorhexidine (PERIDEX) 0.12 % solution Apply 15 mL to the mouth or throat 4 (four) times a day.     furosemide (LASIX) 10 mg/mL solution 10 mg every  other day. Via PEG-tube, hold if SBP <90     levETIRAcetam (KEPPRA) 100 mg/mL solution 1,000 mg 2 (two) times a day. Via PEG-tube     lisinopriL (PRINIVIL,ZESTRIL) 2.5 MG tablet 1.25 mg daily. Via PEG-tube, hold if SBP <90     LORazepam (ATIVAN) 0.5 MG tablet 1 tablet (0.5 mg total) by G-tube route 4 (four) times a day.     metoclopramide (REGLAN) 5 MG tablet 5 mg 2 (two) times a day. Via PEG-tube     metoprolol tartrate (LOPRESSOR) 25 MG tablet 6.25 mg 2 (two) times a day. Via PEG-tube, hold if SBP <90, HR <60     neomycin-bacitracin-polymyxin B (NEOSPORIN) 3.5-400-5,000 mg-unit-unit OiPk ointment Apply 1 application topically 2 (two) times a day.     omeprazole (PRILOSEC) 2 mg/mL SusR suspension 40 mg daily before breakfast. Via PEG-tube     pantoprazole (PROTONIX) 40 MG tablet 40 mg daily. Via PEG-tube     potassium bicarb-citric acid 20 mEq TbEF 20 mEq daily. Via PEG-tube     QUEtiapine (SEROQUEL) 25 MG tablet 12.5 mg 3 (three) times a day. Via PEG-tube     ticagrelor (BRILINTA) 90 mg Tab 90 mg 2 (two) times a day. Via PEG-tube     traZODone (DESYREL) 50 MG tablet 50 mg at bedtime. Via PEG-tube     white petrolatum (AQUAPHOR ORIGINAL) 41 % Oint Apply 1 application topically 2 (two) times a day. Apply to feet/legs         REVIEW OF SYSTEMS:  Unable to verbalize due to aphasia    PHYSICAL EXAMINATION:  Vitals:    08/26/20 0504   BP: 96/52   Pulse: 92   Temp: 97.1  F (36.2  C)   Weight: 174 lb (78.9 kg)         GENERAL: Does not follow simple commands, makes eye contact, non verbal.  HEENT: Head is normocephalic with normal hair distribution. No evidence of trauma. Ears: No acute purulent discharge. Eyes: Conjunctivae pink with no scleral jaundice. Nose: Normal mucosa and septum. NECK: Supple with no cervical or supraclavicular lymphadenopathy. Trachea is midline, healing trach stoma  EXTREMITIES: Atraumatic. Full range of motion on both upper and lower extremities, there is no tenderness to palpation, no pedal  edema, no cyanosis or clubbing, no calf tenderness, normal cap refill, no joint swelling.  SKIN: Warm and dry, no erythema noted, abdomen with raw scratched irritated areas and pus like dried scabs, less erythema, improving.  NEUROLOGICAL: The patient is oriented to person, TBI      LABS:    Lab Results   Component Value Date    WBC 8.3 08/06/2020    HGB 11.0 (L) 08/06/2020    HCT 34.9 (L) 08/06/2020    MCV 90 08/06/2020     08/06/2020       Results for orders placed or performed in visit on 08/18/20   Basic Metabolic Panel   Result Value Ref Range    Sodium 141 136 - 145 mmol/L    Potassium 4.0 3.5 - 5.0 mmol/L    Chloride 104 98 - 107 mmol/L    CO2 25 22 - 31 mmol/L    Anion Gap, Calculation 12 5 - 18 mmol/L    Glucose 90 70 - 125 mg/dL    Calcium 9.5 8.5 - 10.5 mg/dL    BUN 12 8 - 22 mg/dL    Creatinine 0.81 0.70 - 1.30 mg/dL    GFR MDRD Af Amer >60 >60 mL/min/1.73m2    GFR MDRD Non Af Amer >60 >60 mL/min/1.73m2           No results found for: HGBA1C  No results found for: [id number]  No results found for: [id number]    ASSESSMENT/PLAN:  1. Gastrojejunostomy tube status (H)    2. Impetigo any site      1. GT:  Status, Was changed with IR, no reported leaking or issues, infusing well. He does have feedings bolus vs continuous.   2 . Impetigo: Current treatment of augmentin per GT and bacatracin topical has demonstrated effective and skin impairment improving. Encourage binder on to protect skin.   Electronically signed by:  Fouzia High CNP  This progress note was completed using Dragon software and there may be grammatical errors.

## 2021-06-10 NOTE — PROGRESS NOTES
[facility]      NAME:  Scot Bishop             :  1980    MRN: [id number]    CODE STATUS:      FACILITY: [facility] [[id number]]       CHIEF COMPLAIN/REASON FOR VISIT:  Chief Complaint   Patient presents with     Problem Visit     gtube leaking       HISTORY OF PRESENT ILLNESS: Scot Bishop is a 40 y.o. male being seen tper nursing request for his GTube malfunction. He has no known medical past as per his EMR from [facility] prior to a NStemi in May 2020. At that juncture he was in cardiac cath lab and suffered a hypoxic brain injury, high temp and thrombotic event. He is seen in his room today. Non verbal and did not track with his eyes. He will make eye contact. He has repetitive movement while in bed.He has repetitive laughing today. He varies between crying and laughing per nursing.  Moving legs and arms. Per nursing he  Slid from bed over week end, no apparent injuries, they are placing a scoop mattress for preventive falls. . He will need ongoing rehab services with PT/OT/ST. Has Gtube de to his dysphagia., spent a great deal of time educaing nursing R/T GT use and problem solving ongoing concerns.    No Known Allergies:     Current Outpatient Medications   Medication Sig     acetaminophen (TYLENOL) 650 mg/20.3 mL Soln 650 mg every 6 (six) hours as needed. Via PEG-tube      amantadine HCL (SYMMETREL) 50 mg/5 mL solution 100 mg every 12 (twelve) hours. Via PEG-tube     aspirin 81 MG EC tablet 81 mg daily. Via PEG-tube     atorvastatin (LIPITOR) 40 MG tablet 40 mg at bedtime. Via PEG-tube     bromocriptine (PARLODEL) 2.5 mg tablet 2.5 mg 2 (two) times a day. Via PEG-tube     chlorhexidine (PERIDEX) 0.12 % solution Apply 15 mL to the mouth or throat 4 (four) times a day.     furosemide (LASIX) 10 mg/mL solution 10 mg every other day. Via PEG-tube, hold if SBP <90     levETIRAcetam (KEPPRA) 100 mg/mL solution 1,000 mg 2 (two) times a day. Via PEG-tube      lisinopriL (PRINIVIL,ZESTRIL) 2.5 MG tablet 1.25 mg daily. Via PEG-tube, hold if SBP <90     LORazepam (ATIVAN) 0.5 MG tablet 1 tablet (0.5 mg total) by G-tube route 4 (four) times a day.     metoclopramide (REGLAN) 5 MG tablet 5 mg 2 (two) times a day. Via PEG-tube     metoprolol tartrate (LOPRESSOR) 25 MG tablet 6.25 mg 2 (two) times a day. Via PEG-tube, hold if SBP <90, HR <60     neomycin-bacitracin-polymyxin B (NEOSPORIN) 3.5-400-5,000 mg-unit-unit OiPk ointment Apply 1 application topically 2 (two) times a day.     omeprazole (PRILOSEC) 2 mg/mL SusR suspension 40 mg daily before breakfast. Via PEG-tube     pantoprazole (PROTONIX) 40 MG tablet 40 mg daily. Via PEG-tube     potassium bicarb-citric acid 20 mEq TbEF 20 mEq daily. Via PEG-tube     QUEtiapine (SEROQUEL) 25 MG tablet 12.5 mg 3 (three) times a day. Via PEG-tube     ticagrelor (BRILINTA) 90 mg Tab 90 mg 2 (two) times a day. Via PEG-tube     traZODone (DESYREL) 50 MG tablet 50 mg at bedtime. Via PEG-tube     white petrolatum (AQUAPHOR ORIGINAL) 41 % Oint Apply 1 application topically 2 (two) times a day. Apply to feet/legs         REVIEW OF SYSTEMS:  Unable to verbalize due to aphasia    PHYSICAL EXAMINATION:  Vitals:    08/18/20 0718   BP: 98/88   Pulse: (!) 111   Temp: 97.5  F (36.4  C)   Weight: 173 lb 6.4 oz (78.7 kg)         GENERAL: Does not follow simple commands, makes eye contact, non verbal.  HEENT: Head is normocephalic with normal hair distribution. No evidence of trauma. Ears: No acute purulent discharge. Eyes: Conjunctivae pink with no scleral jaundice. Nose: Normal mucosa and septum. NECK: Supple with no cervical or supraclavicular lymphadenopathy. Trachea is midline, healing trach stoma  CHEST: No tenderness or deformity, no crepitus  LUNG: Clear to auscultation with good chest expansion. There are no crackles or wheezes, normal AP diameter.    CVS: There is good S1  S2, rhythm is regular.Pulse is at 98\  ABDOMEN: Globular and soft,  nontender to palpation, non distended, no masses, no organomegaly, good bowel sounds, no rebound or guarding, no peritoneal signs. Gtube in place, wears an abdominal binder to prevent pulling  EXTREMITIES: Atraumatic. Full range of motion on both upper and lower extremities, there is no tenderness to palpation, no pedal edema, no cyanosis or clubbing, no calf tenderness, normal cap refill, no joint swelling.  SKIN: Warm and dry, no erythema noted, no rashes or lesions.  NEUROLOGICAL: The patient is oriented to person, place and time. Strength and sensation are grossly intact. Face is symmetric.          LABS:    Lab Results   Component Value Date    WBC 8.3 08/06/2020    HGB 11.0 (L) 08/06/2020    HCT 34.9 (L) 08/06/2020    MCV 90 08/06/2020     08/06/2020       Results for orders placed or performed in visit on 08/06/20   Basic Metabolic Panel   Result Value Ref Range    Sodium 138 136 - 145 mmol/L    Potassium 3.5 3.5 - 5.0 mmol/L    Chloride 104 98 - 107 mmol/L    CO2 22 22 - 31 mmol/L    Anion Gap, Calculation 12 5 - 18 mmol/L    Glucose 91 70 - 125 mg/dL    Calcium 9.7 8.5 - 10.5 mg/dL    BUN 13 8 - 22 mg/dL    Creatinine 0.82 0.70 - 1.30 mg/dL    GFR MDRD Af Amer >60 >60 mL/min/1.73m2    GFR MDRD Non Af Amer >60 >60 mL/min/1.73m2           No results found for: HGBA1C  No results found for: [id number]  No results found for: [id number]    ASSESSMENT/PLAN:  1. Acute respiratory failure with hypoxia (H)    2. Gastrojejunostomy tube status (H)      1. Hypoxic brain injury: Will be in TCU for rehabilitation, PT/OT/ST  And dietary to follow SW for dc planning ,  SN to manage meds via tube and chronic medical conditons. Fall risk , a scoop mattress will be put in place.     2 . Dysphagia and GTUBE leaking: All nutrition via Gtube, Placement port not staying in place. Requested nursing ry to see if stopcock device will stay in place and have TFing flow thru this. If not able to find will need to have IR apt  to replace feeding tube. D/T brain injury pt restless and much movment. Uses abdominal binder to preven any dislodgment of tube.  Conflicting reports of pt not recievingf tfing over week end due to gtube problems. Lips dry.  We will run IVFS D51/2 at 50 cc until we can get the TFing working. Start fluids at 50 cc an hour x24 hrs and then we will fu on 8/18.  Electronically signed by:  Fouzia High CNP  This progress note was completed using Dragon software and there may be grammatical errors.        48  minutes spent of which greater than 60  % was face to face communication with the patient and nursing about above plan of care including  Gtube probleems and alternative hydration until tube funtioning properly.

## 2021-06-10 NOTE — PROGRESS NOTES
[facility] Admission note      Patient: Scot Bishop  MRN: [id number]  Date of Service: 8/13/2020      [facility] [[id number]]  Reason for Visit     Chief Complaint   Patient presents with     Review Of Multiple Medical Conditions   Follow-up on multiple falls    Code Status     FULL CODE    Assessment     -Acute VF arrest secondary to acute ST SUKI  -Acute LAD occlusion with underlying history of coronary artery disease  -Hypoxic brain injury  - Severe dysphagia currently discharged on tube feeding  -Aspiration pneumonia currently resolved  -Acute hypoxic respiratory failure currently resolved  -History of lengthy stay in the hospital with multiple procedures including requiring intubation and ECMO support from 5 17-5 19 with tracheostomy and PEG placement.  -LOWELL currently resolved  -Persistent hypotension    Plan     Patient has been admitted to the TCU as a transfer from the hospital where he had a lengthy stay of more than 2 months.  Today has had a significant change in condition with more awake and alertness noted.  He was noted to be sitting up and trying to talk though he did not participate in any conversation.  Unfortunately he continues to have multiple falls when he rolls of bed so he has been transferred to a mattress on the floor.  Nursing is trying various positioning for a wheelchair but unfortunately even with a Broda chair not successful and he continues to slide downwards with involuntary movement noted.  Medication review done wondering if this is a side effect of Reglan will discontinue that.  Nursing to monitor him for any emesis concerns.  Staff will also let me know if his involuntary movement subsided with discontinuation of Reglan  He was given this medication because of persistent emesis and difficulty tolerating tube feeding   so for that seems to be going well  He will eventually need placement in a group home  Staff advised to recheck weights because of  significant discrepancy noted    History     Patient is a very pleasant 40 y.o. male who is admitted to TCU  Patient has been admitted after a very lengthy more than 60-day stay in the hospital.  He presented following PEA/VF arrest in the community.  He was admitted and noted to have anterior acute ST SUKI.  He had multiple episodes of cardiac arrest  He required ECMO support and was emergently taken for cardiac catheterization which showed a thrombotic occlusion of the LAD which was treated with aspiration thrombectomy and PCI with drug-eluting stent of the proximal LAD.  He has been discharged on medical management.  He does not give any evidence of chest pain but again history is difficult to obtain from him.  Blood pressures remain low frequently because of autonomic insufficiency    He also suffered from hypoxic brain injury.  Initial CT of the head was negative but EEG shows severe diffuse encephalopathy without seizure or epilepsy.  Repeat CT did show multifocal acute/subacute cerebral infarcts.  And brain injury which is consistent with hypoxic brain injury.  No improvement noted in the TCU he continues to be pretty nonresponsive and nonverbal.  No attempt of communication in any form has been successful he does not make any eye contact.  He has had some alertness now noted with more alertness noted however he still continues not to make any eye command and does not follow any commands either  He was noted to have aspiration pneumonitis which was treated.  Subsequently due to profound dysphagia he had PEG tube placement done and initiated on tube feeding.  Unfortunately had recurrent emesis after PEG tube placement.  A CT of his chest abdomen pelvis was done which did not show any source for his emesis.  Zofran has been added for relief.  He is also on Protonix.  Tube feeding is going well  He has been reporting that his tube feeding has been going well.  He has not had any emesis.    He had persistent fever  in the hospital with elevated white count.  He had extensive work-up done x-ray will did not show any findings CT was negative.  Repeat C. difficile was negative and UA negative he did undergo 7 days of Zosyn which she is currently done with.  He currently remains afebrile  Recheck CBC done in the TCU shows a white count of 8.3.    He has had multiple falls in the TCU.  Placement efforts have been unsuccessful and finally has been transferred to mattress on the floor.  Unfortunately remains hard to care for in light of his profound cognitive impairment.  He has become more alert but not responsive he remains alert and oriented to none.  No eye contact does not follow any commands.  Frequently will rule out of bed and now has been placed on the floor on the mattress because of multiple falls      Past Medical History     Active Ambulatory (Non-Hospital) Problems    Diagnosis     Hypoxic brain injury (H)     Dysphagia     Cardiac arrest (H)     Primary Lymphadenitis     Nicotine Dependence     Backache     Past Medical History:   Diagnosis Date     Acute respiratory failure with hypoxia (H)      LOWELL (acute kidney injury) (H)      Cardiac arrest (H)      Dysphagia      HTN (hypertension)      Hypoxic ischemic encephalopathy      NSTEMI (non-ST elevated myocardial infarction) (H)        Past Social History     Reviewed, and he  reports that he has been smoking. He has never used smokeless tobacco. He reports current alcohol use. He reports that he does not use drugs.    Family History     Reviewed, and family history includes Diabetes in his maternal aunt; Lung cancer in his maternal grandfather; Other in his father.    Medication List   Post Discharge Medication Reconciliation Status: discharge medications reconciled, continue medications without change   Current Outpatient Medications on File Prior to Visit   Medication Sig Dispense Refill     acetaminophen (TYLENOL) 650 mg/20.3 mL Soln 650 mg every 6 (six) hours as  needed. Via PEG-tube        amantadine HCL (SYMMETREL) 50 mg/5 mL solution 100 mg every 12 (twelve) hours. Via PEG-tube       aspirin 81 MG EC tablet 81 mg daily. Via PEG-tube       atorvastatin (LIPITOR) 40 MG tablet 40 mg at bedtime. Via PEG-tube       bromocriptine (PARLODEL) 2.5 mg tablet 2.5 mg 2 (two) times a day. Via PEG-tube       chlorhexidine (PERIDEX) 0.12 % solution Apply 15 mL to the mouth or throat 4 (four) times a day.       furosemide (LASIX) 10 mg/mL solution 10 mg every other day. Via PEG-tube, hold if SBP <90       levETIRAcetam (KEPPRA) 100 mg/mL solution 1,000 mg 2 (two) times a day. Via PEG-tube       lisinopriL (PRINIVIL,ZESTRIL) 2.5 MG tablet 1.25 mg daily. Via PEG-tube, hold if SBP <90       LORazepam (ATIVAN) 0.5 MG tablet 1 tablet (0.5 mg total) by G-tube route 4 (four) times a day. 120 tablet 0     metoclopramide (REGLAN) 5 MG tablet 5 mg 2 (two) times a day. Via PEG-tube       metoprolol tartrate (LOPRESSOR) 25 MG tablet 6.25 mg 2 (two) times a day. Via PEG-tube, hold if SBP <90, HR <60       neomycin-bacitracin-polymyxin B (NEOSPORIN) 3.5-400-5,000 mg-unit-unit OiPk ointment Apply 1 application topically 2 (two) times a day.       omeprazole (PRILOSEC) 2 mg/mL SusR suspension 40 mg daily before breakfast. Via PEG-tube       pantoprazole (PROTONIX) 40 MG tablet 40 mg daily. Via PEG-tube       potassium bicarb-citric acid 20 mEq TbEF 20 mEq daily. Via PEG-tube       QUEtiapine (SEROQUEL) 25 MG tablet 12.5 mg 3 (three) times a day. Via PEG-tube       ticagrelor (BRILINTA) 90 mg Tab 90 mg 2 (two) times a day. Via PEG-tube       traZODone (DESYREL) 50 MG tablet 50 mg at bedtime. Via PEG-tube       white petrolatum (AQUAPHOR ORIGINAL) 41 % Oint Apply 1 application topically 2 (two) times a day. Apply to feet/legs       No current facility-administered medications on file prior to visit.        Allergies     No Known Allergies    Review of Systems   A comprehensive review of 14 systems was  done. Pertinent findings noted here and in history of present illness. All the rest negative.  Constitutional: Negative.  Negative for fever, chills, he has activity change, appetite change and fatigue.   He has now been walking and taking a few steps.  He has been talking also  HENT: Negative for congestion and facial swelling.    Eyes: Negative for photophobia, redness and visual disturbance.   Respiratory: Negative for cough and chest tightness.    Cardiovascular: Negative for chest pain, palpitations and leg swelling.   Gastrointestinal: Negative for nausea, diarrhea, constipation, blood in stool and abdominal distention.   Genitourinary: Negative.    Musculoskeletal: Negative.  Hard to assess he does move his legs and arms spontaneously but not to command  Skin: Negative.    Neurological: Negative for dizziness, tremors, syncope, weakness, light-headedness and headaches.   Nonresponsive  Hematological: Does not bruise/bleed easily.   Psychiatric/Behavioral: Negative.  Unable to assess as patient does not cooperate  However today noted to be talking spontaneously  Staff reporting that he is not sleeping well.  He has significant anxiety and frequently rolls out of bed      Physical Exam     Recent Vitals 8/9/2020   Weight 219 lbs   /74   Pulse 98   Temp 98.3   Temp src -   SpO2 -   Some recent data might be hidden   Weight is 173 pounds blood pressure 93/61 temp 98 pulse 80    Constitutional:  appears well-developed.   HEENT:  Normocephalic and atraumatic.  Eyes: Conjunctivae and EOM are normal. Pupils are equal, round, and reactive to light. No discharge.  No scleral icterus. Nose normal. Mouth/Throat: Oropharynx is clear and moist. No oropharyngeal exudate.    NECK: Normal range of motion. Neck supple. No JVD present. No tracheal deviation present. No thyromegaly present.   CARDIOVASCULAR: Normal rate, regular rhythm and intact distal pulses.  Exam reveals no gallop and no friction rub.  Systolic murmur  present.  PULMONARY: Effort normal and breath sounds normal. No respiratory distress.No Wheezing or rales.  ABDOMEN: Soft. Bowel sounds are normal. No distension and no mass.  There is no tenderness. There is no rebound and no guarding. No HSM.  MUSCULOSKELETAL: Normal range of motion. No edema and no tenderness. Mild kyphosis, no tenderness.  Hard to assess as patient does not respond to verbal commands or follow them.  He seems to be spontaneously moving his arms and legs though  LYMPH NODES: Has no cervical, supraclavicular, axillary and groin adenopathy.   NEUROLOGICAL: Alert and oriented to NONE. No cranial nerve deficit.  Normal muscle tone. Coordination normal.   He does not make any eye contact.  Does not follow verbal commands either  GENITOURINARY: Deferred exam.  SKIN: Skin is warm and dry. No rash noted. No erythema. No pallor.   EXTREMITIES: No cyanosis, no clubbing, no edema. No Deformity.  PSYCHIATRIC: mood, affect and behavior is hard to assess because of noncommunication.      Lab Results     Hemoglobin 10.6.  White count was 11.5 with platelets of 336.  Electrolytes were normal.  Calcium 9.7 magnesium 2.2.  CR 0.76  4 days 119 AST 27 ALT 45  Recent Results (from the past 240 hour(s))   Basic Metabolic Panel   Result Value Ref Range    Sodium 138 136 - 145 mmol/L    Potassium 3.5 3.5 - 5.0 mmol/L    Chloride 104 98 - 107 mmol/L    CO2 22 22 - 31 mmol/L    Anion Gap, Calculation 12 5 - 18 mmol/L    Glucose 91 70 - 125 mg/dL    Calcium 9.7 8.5 - 10.5 mg/dL    BUN 13 8 - 22 mg/dL    Creatinine 0.82 0.70 - 1.30 mg/dL    GFR MDRD Af Amer >60 >60 mL/min/1.73m2    GFR MDRD Non Af Amer >60 >60 mL/min/1.73m2   HM2(CBC w/o Differential)   Result Value Ref Range    WBC 8.3 4.0 - 11.0 thou/uL    RBC 3.87 (L) 4.40 - 6.20 mill/uL    Hemoglobin 11.0 (L) 14.0 - 18.0 g/dL    Hematocrit 34.9 (L) 40.0 - 54.0 %    MCV 90 80 - 100 fL    MCH 28.4 27.0 - 34.0 pg    MCHC 31.5 (L) 32.0 - 36.0 g/dL    RDW 14.2 11.0 - 14.5 %     Platelets 296 140 - 440 thou/uL    MPV 11.5 8.5 - 12.5 fL   Magnesium   Result Value Ref Range    Magnesium 2.0 1.8 - 2.6 mg/dL       Imaging Results     Echocardiogram on 6/10/2020 shows LV ejection fraction of 36%.  LAD territory akinesis with right ventricular function chamber size wall movement and thickness are normal.    Coronary angiogram shows 100% stenosis of the proximal LAD lesion    CT of the head was negative    DARIO Vargas

## 2021-06-10 NOTE — PROGRESS NOTES
[facility]      NAME:  Scot Bishop             :  1980    MRN: [id number]    CODE STATUS:      FACILITY: [facility] [[id number]]       CHIEF COMPLAIN/REASON FOR VISIT:  Chief Complaint   Patient presents with     Problem Visit     imentago       HISTORY OF PRESENT ILLNESS: Scot Bishop is a 40 y.o. male being seen per nursing request karen abdominal rash.  Dietary also changed to bolus feedings.  He has no known medical past as per his EMR from [facility] prior to a NStemi in May 2020. At that juncture he was in cardiac cath lab and suffered a hypoxic brain injury, high temp and thrombotic event. He is seen in his room today. Non verbal and did not track with his eyes. He will make eye contact. He has repetitive movement while in bed.He has repetitive laughing today. He varies between crying and laughing per nursing.  Moving legs and arms. Per nursing he  Slid from bed over week end, no apparent injuries, they are placing a scoop mattress for preventive falls. . He will need ongoing rehab services with PT/OT/ST. Has Gtube de to his dysphagia. Unfortunealy due to Scot Hypoxic brain injury he will not be able to understand hygiene education and nursing willneed to meet these needs.    No Known Allergies:     Current Outpatient Medications   Medication Sig     acetaminophen (TYLENOL) 650 mg/20.3 mL Soln 650 mg every 6 (six) hours as needed. Via PEG-tube      amantadine HCL (SYMMETREL) 50 mg/5 mL solution 100 mg every 12 (twelve) hours. Via PEG-tube     aspirin 81 MG EC tablet 81 mg daily. Via PEG-tube     atorvastatin (LIPITOR) 40 MG tablet 40 mg at bedtime. Via PEG-tube     bromocriptine (PARLODEL) 2.5 mg tablet 2.5 mg 2 (two) times a day. Via PEG-tube     chlorhexidine (PERIDEX) 0.12 % solution Apply 15 mL to the mouth or throat 4 (four) times a day.     furosemide (LASIX) 10 mg/mL solution 10 mg every other day. Via PEG-tube, hold if SBP <90     levETIRAcetam  (KEPPRA) 100 mg/mL solution 1,000 mg 2 (two) times a day. Via PEG-tube     lisinopriL (PRINIVIL,ZESTRIL) 2.5 MG tablet 1.25 mg daily. Via PEG-tube, hold if SBP <90     LORazepam (ATIVAN) 0.5 MG tablet 1 tablet (0.5 mg total) by G-tube route 4 (four) times a day.     metoclopramide (REGLAN) 5 MG tablet 5 mg 2 (two) times a day. Via PEG-tube     metoprolol tartrate (LOPRESSOR) 25 MG tablet 6.25 mg 2 (two) times a day. Via PEG-tube, hold if SBP <90, HR <60     neomycin-bacitracin-polymyxin B (NEOSPORIN) 3.5-400-5,000 mg-unit-unit OiPk ointment Apply 1 application topically 2 (two) times a day.     omeprazole (PRILOSEC) 2 mg/mL SusR suspension 40 mg daily before breakfast. Via PEG-tube     pantoprazole (PROTONIX) 40 MG tablet 40 mg daily. Via PEG-tube     potassium bicarb-citric acid 20 mEq TbEF 20 mEq daily. Via PEG-tube     QUEtiapine (SEROQUEL) 25 MG tablet 12.5 mg 3 (three) times a day. Via PEG-tube     ticagrelor (BRILINTA) 90 mg Tab 90 mg 2 (two) times a day. Via PEG-tube     traZODone (DESYREL) 50 MG tablet 50 mg at bedtime. Via PEG-tube     white petrolatum (AQUAPHOR ORIGINAL) 41 % Oint Apply 1 application topically 2 (two) times a day. Apply to feet/legs         REVIEW OF SYSTEMS:  Unable to verbalize due to aphasia    PHYSICAL EXAMINATION:  Vitals:    08/22/20 1942   BP: 96/61   Pulse: 98   Temp: (!) 65  F (18.3  C)   Weight: 176 lb (79.8 kg)         GENERAL: Does not follow simple commands, makes eye contact, non verbal.  HEENT: Head is normocephalic with normal hair distribution. No evidence of trauma. Ears: No acute purulent discharge. Eyes: Conjunctivae pink with no scleral jaundice. Nose: Normal mucosa and septum. NECK: Supple with no cervical or supraclavicular lymphadenopathy. Trachea is midline, healing trach stoma  EXTREMITIES: Atraumatic. Full range of motion on both upper and lower extremities, there is no tenderness to palpation, no pedal edema, no cyanosis or clubbing, no calf tenderness, normal  cap refill, no joint swelling.  SKIN: Warm and dry, no erythema noted, abdomen with raw scratched irritated areas and pus like dried scabs  NEUROLOGICAL: The patient is oriented to person, TBI      LABS:    Lab Results   Component Value Date    WBC 8.3 08/06/2020    HGB 11.0 (L) 08/06/2020    HCT 34.9 (L) 08/06/2020    MCV 90 08/06/2020     08/06/2020       Results for orders placed or performed in visit on 08/18/20   Basic Metabolic Panel   Result Value Ref Range    Sodium 141 136 - 145 mmol/L    Potassium 4.0 3.5 - 5.0 mmol/L    Chloride 104 98 - 107 mmol/L    CO2 25 22 - 31 mmol/L    Anion Gap, Calculation 12 5 - 18 mmol/L    Glucose 90 70 - 125 mg/dL    Calcium 9.5 8.5 - 10.5 mg/dL    BUN 12 8 - 22 mg/dL    Creatinine 0.81 0.70 - 1.30 mg/dL    GFR MDRD Af Amer >60 >60 mL/min/1.73m2    GFR MDRD Non Af Amer >60 >60 mL/min/1.73m2           No results found for: HGBA1C  No results found for: [id number]  No results found for: [id number]    ASSESSMENT/PLAN:  1. Hypoxic brain injury (H)    2. Impetigo any site      1. Hypoxic brain injury: Will be in TCU for rehabilitation, PT/OT/ST  and dietary to follow SW for dc planning ,  SN to manage meds via tube and chronic medical conditons. Fall risk , a scoop mattress in place. Constant body movement noted.    2 . Impetigo: On badomen. Reviewed with DON, to wear binder with ABD , cleanse daily with bacatracin and Augmentin two times a day X 7 days to clear. Careful hygiene, keep nails short on pt for scratching.     Electronically signed by:  Fouzia High CNP  This progress note was completed using Dragon software and there may be grammatical errors.

## 2021-06-10 NOTE — PROGRESS NOTES
[facility]      NAME:  Scot Bishop             :  1980    MRN: [id number]    CODE STATUS:      FACILITY: [facility] [[id number]]       CHIEF COMPLAIN/REASON FOR VISIT:  Chief Complaint   Patient presents with     Review Of Multiple Medical Conditions     gtube problems       HISTORY OF PRESENT ILLNESS: Scot Bishop is a 40 y.o. male being seen per nursing request for his GTube malfunction.He will need to go to  for check of tube, possible new tube. Dietary also changed to bolus feedings.  He has no known medical past as per his EMR from [facility] prior to a NStemi in May 2020. At that juncture he was in cardiac cath lab and suffered a hypoxic brain injury, high temp and thrombotic event. He is seen in his room today. Non verbal and did not track with his eyes. He will make eye contact. He has repetitive movement while in bed.He has repetitive laughing today. He varies between crying and laughing per nursing.  Moving legs and arms. Per nursing he  Slid from bed over week end, no apparent injuries, they are placing a scoop mattress for preventive falls. . He will need ongoing rehab services with PT/OT/ST. Has Gtube de to his dysphagia    No Known Allergies:     Current Outpatient Medications   Medication Sig     acetaminophen (TYLENOL) 650 mg/20.3 mL Soln 650 mg every 6 (six) hours as needed. Via PEG-tube      amantadine HCL (SYMMETREL) 50 mg/5 mL solution 100 mg every 12 (twelve) hours. Via PEG-tube     aspirin 81 MG EC tablet 81 mg daily. Via PEG-tube     atorvastatin (LIPITOR) 40 MG tablet 40 mg at bedtime. Via PEG-tube     bromocriptine (PARLODEL) 2.5 mg tablet 2.5 mg 2 (two) times a day. Via PEG-tube     chlorhexidine (PERIDEX) 0.12 % solution Apply 15 mL to the mouth or throat 4 (four) times a day.     furosemide (LASIX) 10 mg/mL solution 10 mg every other day. Via PEG-tube, hold if SBP <90     levETIRAcetam (KEPPRA) 100 mg/mL solution 1,000 mg 2 (two)  times a day. Via PEG-tube     lisinopriL (PRINIVIL,ZESTRIL) 2.5 MG tablet 1.25 mg daily. Via PEG-tube, hold if SBP <90     LORazepam (ATIVAN) 0.5 MG tablet 1 tablet (0.5 mg total) by G-tube route 4 (four) times a day.     metoclopramide (REGLAN) 5 MG tablet 5 mg 2 (two) times a day. Via PEG-tube     metoprolol tartrate (LOPRESSOR) 25 MG tablet 6.25 mg 2 (two) times a day. Via PEG-tube, hold if SBP <90, HR <60     neomycin-bacitracin-polymyxin B (NEOSPORIN) 3.5-400-5,000 mg-unit-unit OiPk ointment Apply 1 application topically 2 (two) times a day.     omeprazole (PRILOSEC) 2 mg/mL SusR suspension 40 mg daily before breakfast. Via PEG-tube     pantoprazole (PROTONIX) 40 MG tablet 40 mg daily. Via PEG-tube     potassium bicarb-citric acid 20 mEq TbEF 20 mEq daily. Via PEG-tube     QUEtiapine (SEROQUEL) 25 MG tablet 12.5 mg 3 (three) times a day. Via PEG-tube     ticagrelor (BRILINTA) 90 mg Tab 90 mg 2 (two) times a day. Via PEG-tube     traZODone (DESYREL) 50 MG tablet 50 mg at bedtime. Via PEG-tube     white petrolatum (AQUAPHOR ORIGINAL) 41 % Oint Apply 1 application topically 2 (two) times a day. Apply to feet/legs         REVIEW OF SYSTEMS:  Unable to verbalize due to aphasia    PHYSICAL EXAMINATION:  Vitals:    08/20/20 1451   BP: 101/65   Pulse: (!) 115   Temp: 98.5  F (36.9  C)   Weight: 173 lb 6.4 oz (78.7 kg)         GENERAL: Does not follow simple commands, makes eye contact, non verbal.  HEENT: Head is normocephalic with normal hair distribution. No evidence of trauma. Ears: No acute purulent discharge. Eyes: Conjunctivae pink with no scleral jaundice. Nose: Normal mucosa and septum. NECK: Supple with no cervical or supraclavicular lymphadenopathy. Trachea is midline, healing trach stoma  EXTREMITIES: Atraumatic. Full range of motion on both upper and lower extremities, there is no tenderness to palpation, no pedal edema, no cyanosis or clubbing, no calf tenderness, normal cap refill, no joint  swelling.  SKIN: Warm and dry, no erythema noted, no rashes or lesions.  NEUROLOGICAL: The patient is oriented to person, TBI      LABS:    Lab Results   Component Value Date    WBC 8.3 08/06/2020    HGB 11.0 (L) 08/06/2020    HCT 34.9 (L) 08/06/2020    MCV 90 08/06/2020     08/06/2020       Results for orders placed or performed in visit on 08/18/20   Basic Metabolic Panel   Result Value Ref Range    Sodium 141 136 - 145 mmol/L    Potassium 4.0 3.5 - 5.0 mmol/L    Chloride 104 98 - 107 mmol/L    CO2 25 22 - 31 mmol/L    Anion Gap, Calculation 12 5 - 18 mmol/L    Glucose 90 70 - 125 mg/dL    Calcium 9.5 8.5 - 10.5 mg/dL    BUN 12 8 - 22 mg/dL    Creatinine 0.81 0.70 - 1.30 mg/dL    GFR MDRD Af Amer >60 >60 mL/min/1.73m2    GFR MDRD Non Af Amer >60 >60 mL/min/1.73m2           No results found for: HGBA1C  No results found for: [id number]  No results found for: [id number]    ASSESSMENT/PLAN:  1. Gastrojejunostomy tube status (H)    2. Hypoxic brain damage (H)      1. Hypoxic brain injury: Will be in TCU for rehabilitation, PT/OT/ST  and dietary to follow SW for dc planning ,  SN to manage meds via tube and chronic medical conditons. Fall risk , a scoop mattress in place. Constant body movement noted.    2 . Dysphagia and GTUBE: Nursing reports ongoing leakage, he is to go to IR for a GT check. He has been changed to boulus feedings and appropriate.          Electronically signed by:  Fouzia High CNP  This progress note was completed using Dragon software and there may be grammatical errors.

## 2021-06-10 NOTE — PROGRESS NOTES
[facility] Admission note      Patient: Scot Bishop  MRN: [id number]  Date of Service: 8/4/2020      [facility] [[id number]]  Reason for Visit     Chief Complaint   Patient presents with     H & P       Code Status     FULL CODE    Assessment     -Acute VF arrest secondary to acute ST SUKI  -Acute LAD occlusion with underlying history of coronary artery disease  -Hypoxic brain injury  - Severe dysphagia currently discharged on tube feeding  -Aspiration pneumonia currently resolved  -Acute hypoxic respiratory failure currently resolved  -History of lengthy stay in the hospital with multiple procedures including requiring intubation and ECMO support from 5 17-5 19 with tracheostomy and PEG placement.  -LOWELL currently resolved  -Persistent hypotension    Plan     Patient has been admitted to the TCU as a transfer from the hospital where he had a lengthy stay of more than 2 months.  He was in the ICU requiring support including ventilation oxygen as well as ECMO support and underwent multiple procedures including coronary angiogram and angioplasty.  He also underwent trach and PEG placement.  He had multiple consultations obtained including neurology, ENT palliative care infectious disease general surgery and interventional cardiology and including cardiology  He has been discharged to the nursing home for further rehab.  This is a man with no prior medical history who presented with recurrent cardiac arrest in the hospital with persistent V FIB  He underwent angiography with aspiration thrombectomy and placement of drug-eluting stent.  He had 100% stenosis of the proximal LAD lesion.  Unfortunately repeat echocardiogram post procedure shows a stable ejection fraction with no improvement at 36%.  He was also noted to have his somewhat of a tachycardia with heart rates consistently greater than 100.  Cardiology suspect this is due to evolving MI and postarrest state.  Repeat transthoracic  echocardiogram shows a EF of 36% and LAD territory akinesis.  Currently he is discharged on aspirin with Brilinta twice daily for 1 year.  Unfortunately insurance is refusing to cover his Brilinta.  Pharmacy is calling requesting a drug substitution.  At this point would defer this to cardiology.  Will not change his Brilinta until okayed by cardiology.  He is also on metoprolol lisinopril atorvastatin along with Lasix.  Close monitoring of INS and outs and weights.  He is having persistent hypotension with low blood pressures recorded which limits his ability to tolerate his cardiac meds.  He has been discharged on very low doses off his ACE inhibitor and beta-blocker with advised to hold it based on blood pressure parameters.  Unfortunately he has suffered severe anoxic brain injury and remains pretty much nonresponsive.  He does not make any eye contact.  He does not communicate in any form either by speaking or by writing or by gestures.  He also does not follow any commands.  Communication is very hard.  Unfortunately he has been discharged to the TCU and I suspect he may need alternative placement prior to discharge.  Currently on Seroquel for behavioral issues which will need to be monitored  He also has a tube feeding going on which she seems to be tolerating well.  Due to persistent emesis in the hospital he is on scheduled Reglan along with Zofran as needed  Staff is reporting that he is a 2 person assist with all his cares and is incontinent.  He had multiple complications in the hospital including thrombocytosis, shock liver acute renal insufficiency and hyponatremia and elevated lipases which seem to have corrected.  These will need to be monitored with labs closely in the TCU.  Unfortunately is not doing well and his rehab potential remains very poor.  Suspect he will need placement unless his cognitive status does improve  Currently he is noncooperative with his cares and requires full cognitive and  physical assistance for all his ADLs  Dietary and speech will be involved in his care      History     Patient is a very pleasant 40 y.o. male who is admitted to TCU  Patient has been admitted after a very lengthy more than 60-day stay in the hospital.  He presented following PEA/VF arrest in the community.  He was admitted and noted to have anterior acute ST SUKI.  He had multiple episodes of cardiac arrest  He required ECMO support and was emergently taken for cardiac catheterization which showed a thrombotic occlusion of the LAD which was treated with aspiration thrombectomy and PCI with drug-eluting stent of the proximal LAD.  Currently he has been discharged on medical management.    He also suffered from hypoxic brain injury.  Initial CT of the head was negative but EEG shows severe diffuse encephalopathy without seizure or epilepsy.  Repeat CT did show multifocal acute/subacute cerebral infarcts.  And brain injury which is consistent with hypoxic brain injury.  Unfortunately he remains pretty nonresponsive though he does move his limbs spontaneously he does not make any eye contact he is nonverbal and does not follow any commands.    He was noted to have aspiration pneumonitis which was treated.  Subsequently due to profound dysphagia he had PEG tube placement done and initiated on tube feeding.  Unfortunately had recurrent emesis after PEG tube placement.  A CT of his chest abdomen pelvis was done which did not show any source for his emesis.  Zofran has been added for relief.  He is also on Protonix.    He had persistent fever in the hospital with elevated white count.  He had extensive work-up done x-ray will did not show any findings CT was negative.  Repeat C. difficile was negative and UA negative he did undergo 7 days of Zosyn which she is currently done with.    He had multiple other hospital issues including hyper natremia with elevated sodiums.  Acute renal insufficiency as well as thrombocytosis.   He also underwent shock liver.  Currently has been discharged to the TCU for strengthening rehab and eventually will need placement      Past Medical History     Active Ambulatory (Non-Hospital) Problems    Diagnosis     Hypoxic brain injury (H)     Dysphagia     Cardiac arrest (H)     Primary Lymphadenitis     Nicotine Dependence     Backache     No past medical history on file.    Past Social History     Reviewed, and he  reports that he has been smoking. He does not have any smokeless tobacco history on file. He reports current alcohol use. He reports that he does not use drugs.    Family History     Reviewed, and family history includes Diabetes in his maternal aunt.    Medication List   Post Discharge Medication Reconciliation Status: discharge medications reconciled, continue medications without change   Current Outpatient Medications on File Prior to Visit   Medication Sig Dispense Refill     acetaminophen (TYLENOL) 650 mg/20.3 mL Soln 650 mg every 6 (six) hours as needed. Via PEG-tube        amantadine HCL (SYMMETREL) 50 mg/5 mL solution 100 mg every 12 (twelve) hours. Via PEG-tube       aspirin 81 MG EC tablet 81 mg daily. Via PEG-tube       atorvastatin (LIPITOR) 40 MG tablet 40 mg at bedtime. Via PEG-tube       bromocriptine (PARLODEL) 2.5 mg tablet 2.5 mg 2 (two) times a day. Via PEG-tube       chlorhexidine (PERIDEX) 0.12 % solution Apply 15 mL to the mouth or throat 4 (four) times a day.       furosemide (LASIX) 10 mg/mL solution 10 mg every other day. Via PEG-tube, hold if SBP <90       levETIRAcetam (KEPPRA) 100 mg/mL solution 1,000 mg 2 (two) times a day. Via PEG-tube       lisinopriL (PRINIVIL,ZESTRIL) 2.5 MG tablet 1.25 mg daily. Via PEG-tube, hold if SBP <90       LORazepam (ATIVAN) 1 MG tablet Take 1 tablet (1 mg total) by mouth 3 (three) times a day as needed for anxiety. (Patient taking differently: 0.5 mg 4 (four) times a day. Via PEG-tube) 12 tablet 0     metoclopramide (REGLAN) 5 MG tablet  5 mg 2 (two) times a day. Via PEG-tube       metoprolol tartrate (LOPRESSOR) 25 MG tablet 6.25 mg 2 (two) times a day. Via PEG-tube, hold if SBP <90, HR <60       neomycin-bacitracin-polymyxin B (NEOSPORIN) 3.5-400-5,000 mg-unit-unit OiPk ointment Apply 1 application topically 2 (two) times a day.       omeprazole (PRILOSEC) 2 mg/mL SusR suspension 40 mg daily before breakfast. Via PEG-tube       pantoprazole (PROTONIX) 40 MG tablet 40 mg daily. Via PEG-tube       potassium bicarb-citric acid 20 mEq TbEF 20 mEq daily. Via PEG-tube       QUEtiapine (SEROQUEL) 25 MG tablet 12.5 mg 3 (three) times a day. Via PEG-tube       ticagrelor (BRILINTA) 90 mg Tab 90 mg 2 (two) times a day. Via PEG-tube       traZODone (DESYREL) 50 MG tablet 50 mg at bedtime. Via PEG-tube       white petrolatum (AQUAPHOR ORIGINAL) 41 % Oint Apply 1 application topically 2 (two) times a day. Apply to feet/legs       No current facility-administered medications on file prior to visit.        Allergies     No Known Allergies    Review of Systems   A comprehensive review of 14 systems was done. Pertinent findings noted here and in history of present illness. All the rest negative.  Constitutional: Negative.  Negative for fever, chills, he has activity change, appetite change and fatigue.   HENT: Negative for congestion and facial swelling.    Eyes: Negative for photophobia, redness and visual disturbance.   Respiratory: Negative for cough and chest tightness.    Cardiovascular: Negative for chest pain, palpitations and leg swelling.   Gastrointestinal: Negative for nausea, diarrhea, constipation, blood in stool and abdominal distention.   Genitourinary: Negative.    Musculoskeletal: Negative.  Hard to assess he does move his legs and arms spontaneously but not to command  Skin: Negative.    Neurological: Negative for dizziness, tremors, syncope, weakness, light-headedness and headaches.   Nonresponsive  Hematological: Does not bruise/bleed easily.    Psychiatric/Behavioral: Negative.        Physical Exam     Recent Vitals 8/4/2020   Weight -   BP 98/66   Pulse 97   Temp 97.8   Temp src -   SpO2 -       Constitutional:  appears well-developed.   HEENT:  Normocephalic and atraumatic.  Eyes: Conjunctivae and EOM are normal. Pupils are equal, round, and reactive to light. No discharge.  No scleral icterus. Nose normal. Mouth/Throat: Oropharynx is clear and moist. No oropharyngeal exudate.    NECK: Normal range of motion. Neck supple. No JVD present. No tracheal deviation present. No thyromegaly present.   CARDIOVASCULAR: Normal rate, regular rhythm and intact distal pulses.  Exam reveals no gallop and no friction rub.  Systolic murmur present.  PULMONARY: Effort normal and breath sounds normal. No respiratory distress.No Wheezing or rales.  ABDOMEN: Soft. Bowel sounds are normal. No distension and no mass.  There is no tenderness. There is no rebound and no guarding. No HSM.  MUSCULOSKELETAL: Normal range of motion. No edema and no tenderness. Mild kyphosis, no tenderness.  Hard to assess as patient does not respond to verbal commands or follow them.  He seems to be spontaneously moving his arms and legs though  LYMPH NODES: Has no cervical, supraclavicular, axillary and groin adenopathy.   NEUROLOGICAL: Alert and oriented to NONE. No cranial nerve deficit.  Normal muscle tone. Coordination normal.   He does not make any eye contact.  Patient is aphasic and does not talk.  Does not follow verbal commands either  GENITOURINARY: Deferred exam.  SKIN: Skin is warm and dry. No rash noted. No erythema. No pallor.   EXTREMITIES: No cyanosis, no clubbing, no edema. No Deformity.  PSYCHIATRIC: mood, affect and behavior is hard to assess because of noncommunication.      Lab Results     Hemoglobin 10.6.  White count was 11.5 with platelets of 336.  Electrolytes were normal.  Calcium 9.7 magnesium 2.2.  CR 0.76  4 days 119 AST 27 ALT 45    Imaging Results     Echocardiogram  on 6/10/2020 shows LV ejection fraction of 36%.  LAD territory akinesis with right ventricular function chamber size wall movement and thickness are normal.    Coronary angiogram shows 100% stenosis of the proximal LAD lesion    CT of the head was negative    DARIO Vargas

## 2021-06-10 NOTE — PROGRESS NOTES
[facility]      NAME:  Scot Bishop             :  1980    MRN: [id number]    CODE STATUS:      FACILITY: [facility] [[id number]]       CHIEF COMPLAIN/REASON FOR VISIT:  Chief Complaint   Patient presents with     Review Of Multiple Medical Conditions     rehab progress       HISTORY OF PRESENT ILLNESS: Scot Bishop is a 40 y.o. male being seen today for review of multiple medical conditions. Nursing concerned over pulse of 110. Rechecked apical and was at 98. He has no known medical past as per his EMR from [facility] prior to a NStemi in May 2020. At that juncture he was in cardiac cath lab and suffered a hypoxic brain injury, high temp and thrombotic event. He is seen in his room today. Non verbal and did not track with his eyes. He will make eye contact. He has repetitive movement while in bed.He has repetitive laughing today. He varies between crying and laughing per nursing.  Moving legs and arms. Per nursing he  Slid from bed over week end, no apparent injuries, they are placing a scoop mattress for preventive falls. . He will need ongoing rehab services with PT/OT/ST. Has Gtube de to his dysphagia.     No Known Allergies:     Current Outpatient Medications   Medication Sig     acetaminophen (TYLENOL) 650 mg/20.3 mL Soln 650 mg every 6 (six) hours as needed. Via PEG-tube      amantadine HCL (SYMMETREL) 50 mg/5 mL solution 100 mg every 12 (twelve) hours. Via PEG-tube     aspirin 81 MG EC tablet 81 mg daily. Via PEG-tube     atorvastatin (LIPITOR) 40 MG tablet 40 mg at bedtime. Via PEG-tube     bromocriptine (PARLODEL) 2.5 mg tablet 2.5 mg 2 (two) times a day. Via PEG-tube     chlorhexidine (PERIDEX) 0.12 % solution Apply 15 mL to the mouth or throat 4 (four) times a day.     furosemide (LASIX) 10 mg/mL solution 10 mg every other day. Via PEG-tube, hold if SBP <90     levETIRAcetam (KEPPRA) 100 mg/mL solution 1,000 mg 2 (two) times a day. Via PEG-tube      lisinopriL (PRINIVIL,ZESTRIL) 2.5 MG tablet 1.25 mg daily. Via PEG-tube, hold if SBP <90     LORazepam (ATIVAN) 0.5 MG tablet 1 tablet (0.5 mg total) by G-tube route 4 (four) times a day.     metoclopramide (REGLAN) 5 MG tablet 5 mg 2 (two) times a day. Via PEG-tube     metoprolol tartrate (LOPRESSOR) 25 MG tablet 6.25 mg 2 (two) times a day. Via PEG-tube, hold if SBP <90, HR <60     neomycin-bacitracin-polymyxin B (NEOSPORIN) 3.5-400-5,000 mg-unit-unit OiPk ointment Apply 1 application topically 2 (two) times a day.     omeprazole (PRILOSEC) 2 mg/mL SusR suspension 40 mg daily before breakfast. Via PEG-tube     pantoprazole (PROTONIX) 40 MG tablet 40 mg daily. Via PEG-tube     potassium bicarb-citric acid 20 mEq TbEF 20 mEq daily. Via PEG-tube     QUEtiapine (SEROQUEL) 25 MG tablet 12.5 mg 3 (three) times a day. Via PEG-tube     ticagrelor (BRILINTA) 90 mg Tab 90 mg 2 (two) times a day. Via PEG-tube     traZODone (DESYREL) 50 MG tablet 50 mg at bedtime. Via PEG-tube     white petrolatum (AQUAPHOR ORIGINAL) 41 % Oint Apply 1 application topically 2 (two) times a day. Apply to feet/legs         REVIEW OF SYSTEMS:  Unable to verbalize due to aphasia    PHYSICAL EXAMINATION:  Vitals:    08/09/20 0804   BP: 112/74   Pulse: 98   Temp: 98.3  F (36.8  C)   Weight: 219 lb (99.3 kg)         GENERAL: Does not follow simple commands, makes eye contact, non verbal.  HEENT: Head is normocephalic with normal hair distribution. No evidence of trauma. Ears: No acute purulent discharge. Eyes: Conjunctivae pink with no scleral jaundice. Nose: Normal mucosa and septum. NECK: Supple with no cervical or supraclavicular lymphadenopathy. Trachea is midline, healing trach stoma  CHEST: No tenderness or deformity, no crepitus  LUNG: Clear to auscultation with good chest expansion. There are no crackles or wheezes, normal AP diameter.    CVS: There is good S1  S2, rhythm is regular.Pulse is at 98\  ABDOMEN: Globular and soft, nontender to  palpation, non distended, no masses, no organomegaly, good bowel sounds, no rebound or guarding, no peritoneal signs. Gtube in place, wears an abdominal binder to prevent pulling  EXTREMITIES: Atraumatic. Full range of motion on both upper and lower extremities, there is no tenderness to palpation, no pedal edema, no cyanosis or clubbing, no calf tenderness, normal cap refill, no joint swelling.  SKIN: Warm and dry, no erythema noted, no rashes or lesions.  NEUROLOGICAL: The patient is oriented to person, place and time. Strength and sensation are grossly intact. Face is symmetric.          LABS:    Lab Results   Component Value Date    WBC 8.3 08/06/2020    HGB 11.0 (L) 08/06/2020    HCT 34.9 (L) 08/06/2020    MCV 90 08/06/2020     08/06/2020       Results for orders placed or performed in visit on 08/06/20   Basic Metabolic Panel   Result Value Ref Range    Sodium 138 136 - 145 mmol/L    Potassium 3.5 3.5 - 5.0 mmol/L    Chloride 104 98 - 107 mmol/L    CO2 22 22 - 31 mmol/L    Anion Gap, Calculation 12 5 - 18 mmol/L    Glucose 91 70 - 125 mg/dL    Calcium 9.7 8.5 - 10.5 mg/dL    BUN 13 8 - 22 mg/dL    Creatinine 0.82 0.70 - 1.30 mg/dL    GFR MDRD Af Amer >60 >60 mL/min/1.73m2    GFR MDRD Non Af Amer >60 >60 mL/min/1.73m2           No results found for: HGBA1C  No results found for: [id number]  No results found for: UGVLMBNH96    ASSESSMENT/PLAN:  1. Hypoxic brain injury (H)    2. Dysphagia, unspecified type      1. Hypoxic brain injury: Will be in TCU for rehabilitation, PT/OT/ST  And dietary to follow SW for dc planning , she has been in contact with blended family and they need support as they are not understanding severity of pts brain injury.. I also spoke to NM and suggest she and SW talk to mother, who wasn't involved in care conference as father and step mother were, to discuss prognosis. I am happy to attend if clinical support needed.   SN to manage meds via tube and chronic medical conditons.  Fall risk , a scoop mattress will be put in place.     2 . Dysphagia: All nutrition via Gtube, all meds thru Gtube.  ST to work with pt.SN for GTube management and chronic medical conditions.      Electronically signed by:  Fouzia High CNP  This progress note was completed using Dragon software and there may be grammatical errors.

## 2021-06-10 NOTE — PROGRESS NOTES
[facility] Admission note      Patient: Scot Bishop  MRN: [id number]  Date of Service: 8/11/2020      [facility] [[id number]]  Reason for Visit     Chief Complaint   Patient presents with     Review Of Multiple Medical Conditions   Follow-up on multiple falls    Code Status     FULL CODE    Assessment     -Acute VF arrest secondary to acute ST SUKI  -Acute LAD occlusion with underlying history of coronary artery disease  -Hypoxic brain injury  - Severe dysphagia currently discharged on tube feeding  -Aspiration pneumonia currently resolved  -Acute hypoxic respiratory failure currently resolved  -History of lengthy stay in the hospital with multiple procedures including requiring intubation and ECMO support from 5 17-5 19 with tracheostomy and PEG placement.  -LOWELL currently resolved  -Persistent hypotension    Plan     Patient has been admitted to the TCU as a transfer from the hospital where he had a lengthy stay of more than 2 months.  Today has had a significant change in condition with more awake and alertness noted.  He was noted to be sitting up and trying to talk though he did not participate in any conversation.  He is also had a few episodes when he has been noted to be walking with staff though he has significant hypertonicity.  Mood and behaviors are labile has been stable.  He is tolerating his tube feeding well.  Blood pressures remain labile with frequent low blood pressures noted in the TCU.  No evidence of any volume overload  No breakthrough seizures.  Recheck labs have all been stable with stable electrolytes and white count.  He will eventually need placement if he does not have a significant improvement in his cognitive status  Staff reporting no improvement in his anxiety he does not sleep very well at night due to anxiety.  He still requires close one-to-one monitoring frequently because he will constantly try to roll out of his bed resulting in multiple  falls.  So far no intervention has been successful  We will monitor his sleep log      History     Patient is a very pleasant 40 y.o. male who is admitted to TCU  Patient has been admitted after a very lengthy more than 60-day stay in the hospital.  He presented following PEA/VF arrest in the community.  He was admitted and noted to have anterior acute ST SUKI.  He had multiple episodes of cardiac arrest  He required ECMO support and was emergently taken for cardiac catheterization which showed a thrombotic occlusion of the LAD which was treated with aspiration thrombectomy and PCI with drug-eluting stent of the proximal LAD.  Currently he has been discharged on medical management.  His blood pressures remain low but otherwise he does not give any nonverbal signs of chest pain.  He seems to be breathing comfortably    He also suffered from hypoxic brain injury.  Initial CT of the head was negative but EEG shows severe diffuse encephalopathy without seizure or epilepsy.  Repeat CT did show multifocal acute/subacute cerebral infarcts.  And brain injury which is consistent with hypoxic brain injury.  No improvement noted in the TCU he continues to be pretty nonresponsive and nonverbal.  No attempt of communication in any form has been successful he does not make any eye contact.  However today he has had a significant change in condition is more awake and alert and he seems to be spontaneously talking though he is not able to hold a conversation.  Staff is reporting that they have been since walking with him a little bit though he does have significant hypertonicity noted    He was noted to have aspiration pneumonitis which was treated.  Subsequently due to profound dysphagia he had PEG tube placement done and initiated on tube feeding.  Unfortunately had recurrent emesis after PEG tube placement.  A CT of his chest abdomen pelvis was done which did not show any source for his emesis.  Zofran has been added for  relief.  He is also on Protonix.  Tube feeding is going well  He has been reporting that his tube feeding has been going well.  He has not had any emesis.    He had persistent fever in the hospital with elevated white count.  He had extensive work-up done x-ray will did not show any findings CT was negative.  Repeat C. difficile was negative and UA negative he did undergo 7 days of Zosyn which she is currently done with.  He currently remains afebrile  Recheck CBC done in the TCU shows a white count of 8.3.    He had multiple other hospital issues including hyper natremia with elevated sodiums.  Acute renal insufficiency as well as thrombocytosis.  He also underwent shock liver.  Currently has been discharged to the TCU for strengthening rehab and eventually will need placement  Unfortunately cognitively though he is more awake and alert now not much improvement noted in mental status and he remains poorly participatory in his cares    Past Medical History     Active Ambulatory (Non-Hospital) Problems    Diagnosis     Hypoxic brain injury (H)     Dysphagia     Cardiac arrest (H)     Primary Lymphadenitis     Nicotine Dependence     Backache     Past Medical History:   Diagnosis Date     Acute respiratory failure with hypoxia (H)      LOWELL (acute kidney injury) (H)      Cardiac arrest (H)      Dysphagia      HTN (hypertension)      Hypoxic ischemic encephalopathy      NSTEMI (non-ST elevated myocardial infarction) (H)        Past Social History     Reviewed, and he  reports that he has been smoking. He has never used smokeless tobacco. He reports current alcohol use. He reports that he does not use drugs.    Family History     Reviewed, and family history includes Diabetes in his maternal aunt; Lung cancer in his maternal grandfather; Other in his father.    Medication List   Post Discharge Medication Reconciliation Status: discharge medications reconciled, continue medications without change   Current Outpatient  Medications on File Prior to Visit   Medication Sig Dispense Refill     acetaminophen (TYLENOL) 650 mg/20.3 mL Soln 650 mg every 6 (six) hours as needed. Via PEG-tube        amantadine HCL (SYMMETREL) 50 mg/5 mL solution 100 mg every 12 (twelve) hours. Via PEG-tube       aspirin 81 MG EC tablet 81 mg daily. Via PEG-tube       atorvastatin (LIPITOR) 40 MG tablet 40 mg at bedtime. Via PEG-tube       bromocriptine (PARLODEL) 2.5 mg tablet 2.5 mg 2 (two) times a day. Via PEG-tube       chlorhexidine (PERIDEX) 0.12 % solution Apply 15 mL to the mouth or throat 4 (four) times a day.       furosemide (LASIX) 10 mg/mL solution 10 mg every other day. Via PEG-tube, hold if SBP <90       levETIRAcetam (KEPPRA) 100 mg/mL solution 1,000 mg 2 (two) times a day. Via PEG-tube       lisinopriL (PRINIVIL,ZESTRIL) 2.5 MG tablet 1.25 mg daily. Via PEG-tube, hold if SBP <90       LORazepam (ATIVAN) 0.5 MG tablet 1 tablet (0.5 mg total) by G-tube route 4 (four) times a day. 120 tablet 0     metoclopramide (REGLAN) 5 MG tablet 5 mg 2 (two) times a day. Via PEG-tube       metoprolol tartrate (LOPRESSOR) 25 MG tablet 6.25 mg 2 (two) times a day. Via PEG-tube, hold if SBP <90, HR <60       neomycin-bacitracin-polymyxin B (NEOSPORIN) 3.5-400-5,000 mg-unit-unit OiPk ointment Apply 1 application topically 2 (two) times a day.       omeprazole (PRILOSEC) 2 mg/mL SusR suspension 40 mg daily before breakfast. Via PEG-tube       pantoprazole (PROTONIX) 40 MG tablet 40 mg daily. Via PEG-tube       potassium bicarb-citric acid 20 mEq TbEF 20 mEq daily. Via PEG-tube       QUEtiapine (SEROQUEL) 25 MG tablet 12.5 mg 3 (three) times a day. Via PEG-tube       ticagrelor (BRILINTA) 90 mg Tab 90 mg 2 (two) times a day. Via PEG-tube       traZODone (DESYREL) 50 MG tablet 50 mg at bedtime. Via PEG-tube       white petrolatum (AQUAPHOR ORIGINAL) 41 % Oint Apply 1 application topically 2 (two) times a day. Apply to feet/legs       No current  facility-administered medications on file prior to visit.        Allergies     No Known Allergies    Review of Systems   A comprehensive review of 14 systems was done. Pertinent findings noted here and in history of present illness. All the rest negative.  Constitutional: Negative.  Negative for fever, chills, he has activity change, appetite change and fatigue.   He has now been walking and taking a few steps.  He has been talking also  HENT: Negative for congestion and facial swelling.    Eyes: Negative for photophobia, redness and visual disturbance.   Respiratory: Negative for cough and chest tightness.    Cardiovascular: Negative for chest pain, palpitations and leg swelling.   Gastrointestinal: Negative for nausea, diarrhea, constipation, blood in stool and abdominal distention.   Genitourinary: Negative.    Musculoskeletal: Negative.  Hard to assess he does move his legs and arms spontaneously but not to command  Skin: Negative.    Neurological: Negative for dizziness, tremors, syncope, weakness, light-headedness and headaches.   Nonresponsive  Hematological: Does not bruise/bleed easily.   Psychiatric/Behavioral: Negative.  Unable to assess as patient does not cooperate  However today noted to be talking spontaneously  Staff reporting that he is not sleeping well.  He has significant anxiety and frequently rolls out of bed      Physical Exam     Recent Vitals 8/9/2020   Weight 219 lbs   /74   Pulse 98   Temp 98.3   Temp src -   SpO2 -   Some recent data might be hidden       Constitutional:  appears well-developed.   HEENT:  Normocephalic and atraumatic.  Eyes: Conjunctivae and EOM are normal. Pupils are equal, round, and reactive to light. No discharge.  No scleral icterus. Nose normal. Mouth/Throat: Oropharynx is clear and moist. No oropharyngeal exudate.    NECK: Normal range of motion. Neck supple. No JVD present. No tracheal deviation present. No thyromegaly present.   CARDIOVASCULAR: Normal rate,  regular rhythm and intact distal pulses.  Exam reveals no gallop and no friction rub.  Systolic murmur present.  PULMONARY: Effort normal and breath sounds normal. No respiratory distress.No Wheezing or rales.  ABDOMEN: Soft. Bowel sounds are normal. No distension and no mass.  There is no tenderness. There is no rebound and no guarding. No HSM.  MUSCULOSKELETAL: Normal range of motion. No edema and no tenderness. Mild kyphosis, no tenderness.  Hard to assess as patient does not respond to verbal commands or follow them.  He seems to be spontaneously moving his arms and legs though  LYMPH NODES: Has no cervical, supraclavicular, axillary and groin adenopathy.   NEUROLOGICAL: Alert and oriented to NONE. No cranial nerve deficit.  Normal muscle tone. Coordination normal.   He does not make any eye contact.  Does not follow verbal commands either  GENITOURINARY: Deferred exam.  SKIN: Skin is warm and dry. No rash noted. No erythema. No pallor.   EXTREMITIES: No cyanosis, no clubbing, no edema. No Deformity.  PSYCHIATRIC: mood, affect and behavior is hard to assess because of noncommunication.      Lab Results     Hemoglobin 10.6.  White count was 11.5 with platelets of 336.  Electrolytes were normal.  Calcium 9.7 magnesium 2.2.  CR 0.76  4 days 119 AST 27 ALT 45  Recent Results (from the past 240 hour(s))   Basic Metabolic Panel   Result Value Ref Range    Sodium 138 136 - 145 mmol/L    Potassium 3.5 3.5 - 5.0 mmol/L    Chloride 104 98 - 107 mmol/L    CO2 22 22 - 31 mmol/L    Anion Gap, Calculation 12 5 - 18 mmol/L    Glucose 91 70 - 125 mg/dL    Calcium 9.7 8.5 - 10.5 mg/dL    BUN 13 8 - 22 mg/dL    Creatinine 0.82 0.70 - 1.30 mg/dL    GFR MDRD Af Amer >60 >60 mL/min/1.73m2    GFR MDRD Non Af Amer >60 >60 mL/min/1.73m2   HM2(CBC w/o Differential)   Result Value Ref Range    WBC 8.3 4.0 - 11.0 thou/uL    RBC 3.87 (L) 4.40 - 6.20 mill/uL    Hemoglobin 11.0 (L) 14.0 - 18.0 g/dL    Hematocrit 34.9 (L) 40.0 - 54.0 %    MCV  90 80 - 100 fL    MCH 28.4 27.0 - 34.0 pg    MCHC 31.5 (L) 32.0 - 36.0 g/dL    RDW 14.2 11.0 - 14.5 %    Platelets 296 140 - 440 thou/uL    MPV 11.5 8.5 - 12.5 fL   Magnesium   Result Value Ref Range    Magnesium 2.0 1.8 - 2.6 mg/dL       Imaging Results     Echocardiogram on 6/10/2020 shows LV ejection fraction of 36%.  LAD territory akinesis with right ventricular function chamber size wall movement and thickness are normal.    Coronary angiogram shows 100% stenosis of the proximal LAD lesion    CT of the head was negative    DARIO Vargas

## 2021-06-10 NOTE — PROGRESS NOTES
[facility] Care  note      Patient: Scot Bishop  MRN: [id number]  Date of Service: 8/18/2020      [facility] (formerly [facility]) [[id number]]  Reason for Visit     Chief Complaint   Patient presents with     Problem Visit   Follow-up on multiple falls; difficulty with tube feeding    Code Status     FULL CODE    Assessment     -Acute VF arrest secondary to acute ST SUKI  -Acute LAD occlusion with underlying history of coronary artery disease  -Hypoxic brain injury  - Severe dysphagia currently discharged on tube feeding; now with staff reporting patient constantly pulling tube feeding out  -Aspiration pneumonia currently resolved  -Acute hypoxic respiratory failure currently resolved  -History of lengthy stay in the hospital with multiple procedures including requiring intubation and ECMO support from 5 17-5 19 with tracheostomy and PEG placement.  -LOWELL currently resolved  -Persistent hypotension  -Suspected dehydration due to patient refusing to feeding    Plan     Patient has been admitted to the TCU as a transfer from the hospital where he had a lengthy stay of more than 2 months.  Today has had a significant change in condition with more awake and alertness noted.  However he is not tolerating his tube feeding and repeatedly pulling it out.  Due to this his tube feeding is currently being held.  Urgent appointment in IR is made for him.  That will be done to check his positioning of tube.  Care conference done with nursing speech therapy as well as with dietary  Unfortunately no improvement noted and he is not ready for any trials of oral intake.  Dietary and I had a discussion we are going to discontinue his 24-hour tube feeding.  We will switch him to overnight tube feeding  He can get bolus feeding during the daytime  Planning to switch him from tube feeding overnight from 6 PM to 8 AM  We will give him bolus feedings during the day  If this is not successful we will plan on giving him just bolus  feedings 3-5 times a day to meet his caloric requirement.  Continue with his water flushes to prevent dehydration.  Will add medication for agitation if no improvement noted.  In addition they have already tied a binder to his stomach.   also contacted me to have a care conference with family to discuss goals of care he continues to be a full code.  Unfortunately no meaningful recovery is made and he is not able to ambulate or participate in his cares.  At this point I am requesting them to set up a care conference but before that family to have a face-to-face visit with him.  They can see for him themselves and then we can have a meeting set up to discuss goals of care with them  Total time spent is 35 minutes with more than 25-minute spent face-to-face reviewing care plan with the patient with nursing  as well as dietitian along with .  Goals of care discussion was done along with tube feeding issues were also reviewed as outlined in my note above    History     Patient is a very pleasant 40 y.o. male who is admitted to TCU  Patient has been admitted after a very lengthy more than 60-day stay in the hospital.  He presented following PEA/VF arrest in the community.  He was admitted and noted to have anterior acute ST SUKI.  He had multiple episodes of cardiac arrest  He required ECMO support and was emergently taken for cardiac catheterization which showed a thrombotic occlusion of the LAD which was treated with aspiration thrombectomy and PCI with drug-eluting stent of the proximal LAD.  Blood pressures remain low frequently because of autonomic insufficiency  He is on medical management  He is compliant with his medications    He also suffered from hypoxic brain injury.  Initial CT of the head was negative but EEG shows severe diffuse encephalopathy without seizure or epilepsy.  Repeat CT did show multifocal acute/subacute cerebral infarcts.  And brain injury which is  consistent with hypoxic brain injury.  No improvement noted in the TCU he continues to be pretty nonresponsive and nonverbal.    He is more awake and alert now but does not maintain any reasonable communication.  He does not have any verbal and nonverbal form of communication noted  He does not make eye contact  He does not follow any command  He has had significant dysregulation noted.  Staff is reporting a difficulty with tube feeding now because of agitation.  His tube feeding had to be held and now history was completely pulled out.  Unfortunately based on my discussion with speech therapy is nowhere close to starting oral trials.  In light of his difficulty with tube feeding attempt was made to give him IV fluids unfortunately he pulled his IV fluids out immediately  He was noted to have aspiration pneumonitis which was treated.  Subsequently due to profound dysphagia he had PEG tube placement done and initiated on tube feeding.  Unfortunately had recurrent emesis after PEG tube placement.  A CT of his chest abdomen pelvis was done which did not show any source for his emesis.  Zofran has been added for relief.  He is also on Protonix.  Tube feeding is going well  He has been reporting that his tube feeding has been going well.  He has not had any emesis.    He had persistent fever in the hospital with elevated white count.  He had extensive work-up done x-ray will did not show any findings CT was negative.  Repeat C. difficile was negative and UA negative he did undergo 7 days of Zosyn which she is currently done with.  He currently remains afebrile  Recheck CBC done in the TCU shows a white count of 8.3.    He has had multiple falls in the TCU.  Placement efforts have been unsuccessful and finally has been transferred to mattress on the floor.  Unfortunately remains hard to care for in light of his profound cognitive impairment.  No attempt for positioning has been successful.  He has been tried on a Broda  chair but that has been unsuccessful nursing eventually has placed him on a mattress because he constantly rolled out on the bed.  He continues to have involuntary scissoring-like movement of his extremities      Past Medical History     Active Ambulatory (Non-Hospital) Problems    Diagnosis     Acute respiratory failure with hypoxia (H)     Gastrojejunostomy tube status (H)     Hypoxic brain injury (H)     Dysphagia     Cardiac arrest (H)     Primary Lymphadenitis     Nicotine Dependence     Backache     Past Medical History:   Diagnosis Date     Acute respiratory failure with hypoxia (H)      LOWELL (acute kidney injury) (H)      Cardiac arrest (H)      Dysphagia      HTN (hypertension)      Hypoxic ischemic encephalopathy      NSTEMI (non-ST elevated myocardial infarction) (H)        Past Social History     Reviewed, and he  reports that he has been smoking. He has never used smokeless tobacco. He reports current alcohol use. He reports that he does not use drugs.    Family History     Reviewed, and family history includes Diabetes in his maternal aunt; Lung cancer in his maternal grandfather; Other in his father.    Medication List   Post Discharge Medication Reconciliation Status: discharge medications reconciled, continue medications without change   Current Outpatient Medications on File Prior to Visit   Medication Sig Dispense Refill     acetaminophen (TYLENOL) 650 mg/20.3 mL Soln 650 mg every 6 (six) hours as needed. Via PEG-tube        amantadine HCL (SYMMETREL) 50 mg/5 mL solution 100 mg every 12 (twelve) hours. Via PEG-tube       aspirin 81 MG EC tablet 81 mg daily. Via PEG-tube       atorvastatin (LIPITOR) 40 MG tablet 40 mg at bedtime. Via PEG-tube       bromocriptine (PARLODEL) 2.5 mg tablet 2.5 mg 2 (two) times a day. Via PEG-tube       chlorhexidine (PERIDEX) 0.12 % solution Apply 15 mL to the mouth or throat 4 (four) times a day.       furosemide (LASIX) 10 mg/mL solution 10 mg every other day. Via  PEG-tube, hold if SBP <90       levETIRAcetam (KEPPRA) 100 mg/mL solution 1,000 mg 2 (two) times a day. Via PEG-tube       lisinopriL (PRINIVIL,ZESTRIL) 2.5 MG tablet 1.25 mg daily. Via PEG-tube, hold if SBP <90       LORazepam (ATIVAN) 0.5 MG tablet 1 tablet (0.5 mg total) by G-tube route 4 (four) times a day. 120 tablet 0     metoclopramide (REGLAN) 5 MG tablet 5 mg 2 (two) times a day. Via PEG-tube       metoprolol tartrate (LOPRESSOR) 25 MG tablet 6.25 mg 2 (two) times a day. Via PEG-tube, hold if SBP <90, HR <60       neomycin-bacitracin-polymyxin B (NEOSPORIN) 3.5-400-5,000 mg-unit-unit OiPk ointment Apply 1 application topically 2 (two) times a day.       omeprazole (PRILOSEC) 2 mg/mL SusR suspension 40 mg daily before breakfast. Via PEG-tube       pantoprazole (PROTONIX) 40 MG tablet 40 mg daily. Via PEG-tube       potassium bicarb-citric acid 20 mEq TbEF 20 mEq daily. Via PEG-tube       QUEtiapine (SEROQUEL) 25 MG tablet 12.5 mg 3 (three) times a day. Via PEG-tube       ticagrelor (BRILINTA) 90 mg Tab 90 mg 2 (two) times a day. Via PEG-tube       traZODone (DESYREL) 50 MG tablet 50 mg at bedtime. Via PEG-tube       white petrolatum (AQUAPHOR ORIGINAL) 41 % Oint Apply 1 application topically 2 (two) times a day. Apply to feet/legs       No current facility-administered medications on file prior to visit.        Allergies     No Known Allergies    Review of Systems   A comprehensive review of 14 systems was done. Pertinent findings noted here and in history of present illness. All the rest negative.  Constitutional: Negative.  Negative for fever, chills, he has activity change, appetite change and fatigue.   He has now been walking and taking a few steps.  He has been talking also  HENT: Negative for congestion and facial swelling.    Eyes: Negative for photophobia, redness and visual disturbance.   Respiratory: Negative for cough and chest tightness.    Cardiovascular: Negative for chest pain, palpitations  and leg swelling.   Gastrointestinal: Negative for nausea, diarrhea, constipation, blood in stool and abdominal distention.   Genitourinary: Negative.    Musculoskeletal: Negative.  Hard to assess he does move his legs and arms spontaneously but not to command  Skin: Negative.    Neurological: Negative for dizziness, tremors, syncope, weakness, light-headedness and headaches.   Constant scissoring like movement of his legs and arms noted.  Significant excoriation of the right hip area noted  Pulling his tube feeding out  Nonresponsive  Hematological: Does not bruise/bleed easily.   Psychiatric/Behavioral: Negative.  Unable to assess as patient does not cooperate  However today noted to be talking spontaneously  Staff reporting that he is not sleeping well.  He has significant anxiety and frequently rolls out of bed      Physical Exam     Recent Vitals 8/18/2020   Weight 173 lbs 6 oz   BP 98/88   Pulse 111   Temp 97.5   Temp src -   SpO2 -   Some recent data might be hidden   Weight is 173 pounds blood pressure 93/61 temp 98 pulse 80    Constitutional:  appears well-developed.   HEENT:  Normocephalic and atraumatic.  Eyes: Conjunctivae and EOM are normal. Pupils are equal, round, and reactive to light. No discharge.  No scleral icterus. Nose normal. Mouth/Throat: Oropharynx is clear and moist. No oropharyngeal exudate.    NECK: Normal range of motion. Neck supple. No JVD present. No tracheal deviation present. No thyromegaly present.   CARDIOVASCULAR: Normal rate, regular rhythm and intact distal pulses.  Exam reveals no gallop and no friction rub.  Systolic murmur present.  PULMONARY: Effort normal and breath sounds normal. No respiratory distress.No Wheezing or rales.  ABDOMEN: Soft. Bowel sounds are normal. No distension and no mass.  There is no tenderness. There is no rebound and no guarding. No HSM.  MUSCULOSKELETAL: Normal range of motion. No edema and no tenderness. Mild kyphosis, no tenderness.  Hard to  assess as patient does not respond to verbal commands or follow them.  He seems to be spontaneously moving his arms and legs though  LYMPH NODES: Has no cervical, supraclavicular, axillary and groin adenopathy.   NEUROLOGICAL: Alert and oriented to NONE. No cranial nerve deficit.  Normal muscle tone. Coordination normal.   He does not make any eye contact.  Does not follow verbal commands either  GENITOURINARY: Deferred exam.  SKIN: Skin is warm and dry. No rash noted. No erythema. No pallor.   EXTREMITIES: No cyanosis, no clubbing, no edema. No Deformity.  PSYCHIATRIC: mood, affect and behavior is hard to assess because of noncommunication.      Lab Results     Hemoglobin 10.6.  White count was 11.5 with platelets of 336.  Electrolytes were normal.  Calcium 9.7 magnesium 2.2.  CR 0.76  4 days 119 AST 27 ALT 45  No results found for this or any previous visit (from the past 240 hour(s)).    Imaging Results     Echocardiogram on 6/10/2020 shows LV ejection fraction of 36%.  LAD territory akinesis with right ventricular function chamber size wall movement and thickness are normal.    Coronary angiogram shows 100% stenosis of the proximal LAD lesion    CT of the head was negative    DARIO Vargas    Post-application: Motor, sensory, and vascular responses intact in the injured extremity./Pre-application: Motor, sensory, and vascular responses intact in the injured extremity./The patient/caregiver verbalized understanding of how to care for the injured extremity with splint

## 2021-06-10 NOTE — PROGRESS NOTES
[facility]  note      Patient: Scot Bishop  MRN: [id number]  Date of Service: 8/25/2020      [facility] [[id number]]  Reason for Visit     Chief Complaint   Patient presents with     Problem Visit   Follow-up on fever /low bp    Code Status     FULL CODE    Assessment     -Low-grade temperatures of unclear etiology  -Persistent hypotension low blood pressures noted  -Acute VF arrest secondary to acute ST SUKI  -Acute LAD occlusion with underlying history of coronary artery disease  -Hypoxic brain injury  - Severe dysphagia currently discharged on tube feeding; now with staff reporting patient constantly pulling tube feeding out  -Aspiration pneumonia currently resolved  -Acute hypoxic respiratory failure currently resolved  -History of lengthy stay in the hospital with multiple procedures including requiring intubation and ECMO support from 5 17-5 19 with tracheostomy and PEG placement.  -LOWELL currently resolved  -Suspected dehydration due to patient refusing to feeding    Plan     Patient has been admitted to the TCU as a transfer from the hospital where he had a lengthy stay of more than 2 months.  He is currently in the TCU but remains poorly responsive nonverbal with no attempt at communication possible due to anoxic brain injury.  Nursing requested an evaluation secondary to low-grade temperatures of 99 100   his T-max has been 100.1  He has no localizing symptoms unfortunately he is not a good historian because of his ongoing brain injury.  Continue to monitor temperatures and if greater than 100.5 chest x-ray UA UC and routine labs.  Suspect autonomic insufficiency may be contributing to it.  Chart review does reveal that he had ongoing fever concerns in the hospital to extensive work-up was negative.  Recently he was started on antibiotics and is on Augmentin for G-tube site except infection which have been because of patient pulling on his tube the site is being monitored  closely to  He also has persistent hypotension blood pressures 100/61.  Care plan reviewed with dietitian and his fluid flushes have been liberalized  Again suspect most likely secondary to autonomic insufficiency.  He is on low doses of beta-blocker as well as lisinopril which are held based on his blood pressure regimens.  Also reviewed was discharge planning and goals of care reviewed with family.  He has had a patio visit with his father done and goals of care have been reviewed with them.  He is looking at discharging to a group home      History     Patient is a very pleasant 40 y.o. male who is admitted to TCU  Patient has been admitted after a very lengthy more than 60-day stay in the hospital.  He presented following PEA/VF arrest in the community.  He was admitted and noted to have anterior acute ST SUKI.  He had multiple episodes of cardiac arrest  He required ECMO support and was emergently taken for cardiac catheterization which showed a thrombotic occlusion of the LAD which was treated with aspiration thrombectomy and PCI with drug-eluting stent of the proximal LAD.  He remains chest pain free but unfortunately due to poor history being obtained from him no symptoms could be obtained nursing is reporting nonverbal signs are missing for any pain issues.  He is compliant with his medications which are being given through his feeding tube    He also suffered from hypoxic brain injury.  Initial CT of the head was negative but EEG shows severe diffuse encephalopathy without seizure or epilepsy.  Repeat CT did show multifocal acute/subacute cerebral infarcts.  And brain injury which is consistent with hypoxic brain injury.  No improvement noted in the TCU he continues to be pretty nonresponsive and nonverbal.    At baseline he does not follow any commands.  He remains nonverbal and does not respond to any verbal or nonverbal stimuli.  He is not making any eye comment either  He continues with therapy as per  them he is making some progress.    He remains on tube feeding.  Recently has had a lot of difficulties with tube feeding.  He was sent him back in the hospital for G-tube replacement.  Due to his repeated difficulty with G-tube he has been switched from 24/7 feeding to bolus feeding during the daytime which has worked much better.  All his meds are given via tube as he does not swallow well either  He is nowhere close to starting any feeding trial  Due to concern about wound around his PEG tube site he is on Augmentin      He has had multiple falls in the TCU.  Placement efforts have been unsuccessful and finally has been transferred to mattress on the floor.  Unfortunately remains hard to care for in light of his profound cognitive impairment.  No attempt for positioning has been successful.  He has been tried on a Broda chair but that has been unsuccessful nursing   Unfortunately blood pressure is low and nursing continues to hold his medications because of that reason they report that they it is very difficult to check his blood pressures because of nonstop involuntary movements.    They also wanted to review because of low-grade temperatures.  He has had ongoing fever in the hospital to work-up so far has been negative      Past Medical History     Active Ambulatory (Non-Hospital) Problems    Diagnosis     Impetigo any site     Hypoxic brain damage (H)     Acute respiratory failure with hypoxia (H)     Gastrojejunostomy tube status (H)     Hypoxic brain injury (H)     Dysphagia     Cardiac arrest (H)     Primary Lymphadenitis     Nicotine Dependence     Backache     Past Medical History:   Diagnosis Date     Acute respiratory failure with hypoxia (H)      LOWELL (acute kidney injury) (H)      Cardiac arrest (H)      Dysphagia      HTN (hypertension)      Hypoxic ischemic encephalopathy      NSTEMI (non-ST elevated myocardial infarction) (H)        Past Social History     Reviewed, and he  reports that he has been  smoking. He has never used smokeless tobacco. He reports current alcohol use. He reports that he does not use drugs.    Family History     Reviewed, and family history includes Diabetes in his maternal aunt; Lung cancer in his maternal grandfather; Other in his father.    Medication List   Post Discharge Medication Reconciliation Status: discharge medications reconciled, continue medications without change   Current Outpatient Medications on File Prior to Visit   Medication Sig Dispense Refill     acetaminophen (TYLENOL) 650 mg/20.3 mL Soln 650 mg every 6 (six) hours as needed. Via PEG-tube        amantadine HCL (SYMMETREL) 50 mg/5 mL solution 100 mg every 12 (twelve) hours. Via PEG-tube       aspirin 81 MG EC tablet 81 mg daily. Via PEG-tube       atorvastatin (LIPITOR) 40 MG tablet 40 mg at bedtime. Via PEG-tube       bromocriptine (PARLODEL) 2.5 mg tablet 2.5 mg 2 (two) times a day. Via PEG-tube       chlorhexidine (PERIDEX) 0.12 % solution Apply 15 mL to the mouth or throat 4 (four) times a day.       furosemide (LASIX) 10 mg/mL solution 10 mg every other day. Via PEG-tube, hold if SBP <90       levETIRAcetam (KEPPRA) 100 mg/mL solution 1,000 mg 2 (two) times a day. Via PEG-tube       lisinopriL (PRINIVIL,ZESTRIL) 2.5 MG tablet 1.25 mg daily. Via PEG-tube, hold if SBP <90       LORazepam (ATIVAN) 0.5 MG tablet 1 tablet (0.5 mg total) by G-tube route 4 (four) times a day. 120 tablet 0     metoclopramide (REGLAN) 5 MG tablet 5 mg 2 (two) times a day. Via PEG-tube       metoprolol tartrate (LOPRESSOR) 25 MG tablet 6.25 mg 2 (two) times a day. Via PEG-tube, hold if SBP <90, HR <60       neomycin-bacitracin-polymyxin B (NEOSPORIN) 3.5-400-5,000 mg-unit-unit OiPk ointment Apply 1 application topically 2 (two) times a day.       omeprazole (PRILOSEC) 2 mg/mL SusR suspension 40 mg daily before breakfast. Via PEG-tube       pantoprazole (PROTONIX) 40 MG tablet 40 mg daily. Via PEG-tube       potassium bicarb-citric acid  20 mEq TbEF 20 mEq daily. Via PEG-tube       QUEtiapine (SEROQUEL) 25 MG tablet 12.5 mg 3 (three) times a day. Via PEG-tube       ticagrelor (BRILINTA) 90 mg Tab 90 mg 2 (two) times a day. Via PEG-tube       traZODone (DESYREL) 50 MG tablet 50 mg at bedtime. Via PEG-tube       white petrolatum (AQUAPHOR ORIGINAL) 41 % Oint Apply 1 application topically 2 (two) times a day. Apply to feet/legs       No current facility-administered medications on file prior to visit.        Allergies     No Known Allergies    Review of Systems   A comprehensive review of 14 systems was done. Pertinent findings noted here and in history of present illness. All the rest negative.  Constitutional: Negative.  Negative for fever, chills, he has activity change, appetite change and fatigue.   He has now been walking and taking a few steps.  He has been talking also  HENT: Negative for congestion and facial swelling.    Eyes: Negative for photophobia, redness and visual disturbance.   Respiratory: Negative for cough and chest tightness.    Cardiovascular: Negative for chest pain, palpitations and leg swelling.   Gastrointestinal: Negative for nausea, diarrhea, constipation, blood in stool and abdominal distention.   Genitourinary: Negative.    Musculoskeletal: Negative.  Hard to assess he does move his legs and arms spontaneously but not to command  Skin: Negative.    Neurological: Negative for dizziness, tremors, syncope, weakness, light-headedness and headaches.   Constant scissoring like movement of his legs and arms noted.  Significant excoriation of the right hip area noted  Pulling his tube feeding out  Nonresponsive  Hematological: Does not bruise/bleed easily.   Psychiatric/Behavioral: Negative.  Unable to assess as patient does not cooperate  However today noted to be talking spontaneously  Staff reporting that he is not sleeping well.  He has significant anxiety and frequently rolls out of bed      Physical Exam     Recent Vitals  8/22/2020   Weight 176 lbs   BP 96/61   Pulse 98   Temp 65   Temp src -   SpO2 -   Some recent data might be hidden   Weight is 174 pounds blood pressure 100 /61 temp 98 pulse 80    Constitutional:  appears well-developed.   HEENT:  Normocephalic and atraumatic.  Eyes: Conjunctivae and EOM are normal. Pupils are equal, round, and reactive to light. No discharge.  No scleral icterus. Nose normal. Mouth/Throat: Oropharynx is clear and moist. No oropharyngeal exudate.    NECK: Normal range of motion. Neck supple. No JVD present. No tracheal deviation present. No thyromegaly present.   CARDIOVASCULAR: Normal rate, regular rhythm and intact distal pulses.  Exam reveals no gallop and no friction rub.  Systolic murmur present.  PULMONARY: Effort normal and breath sounds normal. No respiratory distress.No Wheezing or rales.  ABDOMEN: Soft. Bowel sounds are normal. No distension and no mass.  There is no tenderness. There is no rebound and no guarding. No HSM.  Does have a G-tube which is somewhat sore around the insertion site  MUSCULOSKELETAL: Normal range of motion. No edema and no tenderness. Mild kyphosis, no tenderness.  Hard to assess as patient does not respond to verbal commands or follow them.  He seems to be spontaneously moving his arms and legs though  LYMPH NODES: Has no cervical, supraclavicular, axillary and groin adenopathy.   NEUROLOGICAL: Alert and oriented to NONE. No cranial nerve deficit.  Normal muscle tone. Coordination normal.   He does not make any eye contact.  Does not follow verbal commands either  GENITOURINARY: Deferred exam.  SKIN: Skin is warm and dry. No rash noted. No erythema. No pallor.   EXTREMITIES: No cyanosis, no clubbing, no edema. No Deformity.  PSYCHIATRIC: mood, affect and behavior is hard to assess because of noncommunication.      Lab Results     Hemoglobin 10.6.  White count was 11.5 with platelets of 336.  Electrolytes were normal.  Calcium 9.7 magnesium 2.2.  CR 0.76  4 days  119 AST 27 ALT 45  Recent Results (from the past 240 hour(s))   Basic Metabolic Panel   Result Value Ref Range    Sodium 141 136 - 145 mmol/L    Potassium 4.0 3.5 - 5.0 mmol/L    Chloride 104 98 - 107 mmol/L    CO2 25 22 - 31 mmol/L    Anion Gap, Calculation 12 5 - 18 mmol/L    Glucose 90 70 - 125 mg/dL    Calcium 9.5 8.5 - 10.5 mg/dL    BUN 12 8 - 22 mg/dL    Creatinine 0.81 0.70 - 1.30 mg/dL    GFR MDRD Af Amer >60 >60 mL/min/1.73m2    GFR MDRD Non Af Amer >60 >60 mL/min/1.73m2       Imaging Results     Echocardiogram on 6/10/2020 shows LV ejection fraction of 36%.  LAD territory akinesis with right ventricular function chamber size wall movement and thickness are normal.    Coronary angiogram shows 100% stenosis of the proximal LAD lesion    CT of the head was negative    DARIO Vargas

## 2021-06-11 NOTE — PROGRESS NOTES
[facility]      NAME:  Scot Bishop             :  1980    MRN: [id number]    CODE STATUS:      FACILITY: [facility] [[id number]]       CHIEF COMPLAIN/REASON FOR VISIT:  Chief Complaint   Patient presents with     Problem Visit     AGITATION       HISTORY OF PRESENT ILLNESS: Scot Bishop is a 40 y.o. male being seen for per nursing request for blood pressures consistently in high 90s.  We will go ahead and DC his lisinopril, that is only 2.5 mg daily.  We did recently DC his Lasix as well..  Does not appear to be pain.  Staff is one-to-one in him.  Also noted that Scot is very agitated this morning and crying out  [facility] in May 2020. At that juncture he was in cardiac cath lab and suffered a hypoxic brain injury, high temp and thrombotic event. He is seen in his room today. Non verbal and did not track with his eyes. He will make eye contact. He has repetitive movement while in bed.He has repetitive laughing today. He varies between crying and laughing per nursing.  Moving legs and arms. Per nursing he  Slid from bed over week end, no apparent injuries, they are placing a scoop mattress for preventive falls. . He will need ongoing rehab services with PT/OT/ST. Has Gtube due to his dysphagia, which reviewed that weight has been stable.. Unfortunealy due to Scot Hypoxic brain injury he will not be able to understand hygiene education and nursing will need to meet these needs for pt. Therapy continues to work with him, seen ambualting with SBA two.Staff to anticipate needs and provide good elizabeth care two times a day and prn.   continues to work with family towards DC to a group home after TCU stay as patient will be unable to care for self, remains dependant on staff for all ADL and mobility as well as GTube for feedings.. Pt does continue with rehab services    No Known Allergies:     Current Outpatient  Medications   Medication Sig     acetaminophen (TYLENOL) 650 mg/20.3 mL Soln 650 mg every 6 (six) hours as needed. Via PEG-tube      amantadine HCL (SYMMETREL) 50 mg/5 mL solution 100 mg every 12 (twelve) hours. Via PEG-tube     aspirin 81 MG EC tablet 81 mg daily. Via PEG-tube     atorvastatin (LIPITOR) 40 MG tablet 40 mg at bedtime. Via PEG-tube     bromocriptine (PARLODEL) 2.5 mg tablet 2.5 mg 2 (two) times a day. Via PEG-tube     chlorhexidine (PERIDEX) 0.12 % solution Apply 15 mL to the mouth or throat 4 (four) times a day.     levETIRAcetam (KEPPRA) 100 mg/mL solution 1,000 mg 2 (two) times a day. Via PEG-tube     lisinopriL (PRINIVIL,ZESTRIL) 2.5 MG tablet 1.25 mg daily. Via PEG-tube, hold if SBP <90     LORazepam (ATIVAN) 0.5 MG tablet 1 tablet (0.5 mg total) by G-tube route 4 (four) times a day.     metoclopramide (REGLAN) 5 MG tablet 5 mg 2 (two) times a day. Via PEG-tube     metoprolol tartrate (LOPRESSOR) 25 MG tablet 6.25 mg 2 (two) times a day. Via PEG-tube, hold if SBP <90, HR <60     neomycin-bacitracin-polymyxin B (NEOSPORIN) 3.5-400-5,000 mg-unit-unit OiPk ointment Apply 1 application topically 2 (two) times a day.     omeprazole (PRILOSEC) 2 mg/mL SusR suspension 40 mg daily before breakfast. Via PEG-tube     pantoprazole (PROTONIX) 40 MG tablet 40 mg daily. Via PEG-tube     potassium bicarb-citric acid 20 mEq TbEF 20 mEq daily. Via PEG-tube     QUEtiapine (SEROQUEL) 25 MG tablet 12.5 mg 3 (three) times a day. Via PEG-tube     ticagrelor (BRILINTA) 90 mg Tab 90 mg 2 (two) times a day. Via PEG-tube     traZODone (DESYREL) 50 MG tablet 50 mg at bedtime. Via PEG-tube     white petrolatum (AQUAPHOR ORIGINAL) 41 % Oint Apply 1 application topically 2 (two) times a day. Apply to feet/legs         REVIEW OF SYSTEMS:  Unable to verbalize due to aphasia    PHYSICAL EXAMINATION:  Vitals:    09/21/20 1817   BP: 99/57   Pulse: 96   Temp: 98.5  F (36.9  C)   Weight: 168 lb 6.4 oz (76.4 kg)         GENERAL:  Does not follow simple commands, makes eye contact, non verbal.  HEENT: Head is normocephalic with normal hair distribution. No evidence of trauma. Ears: No acute purulent discharge. Eyes: Conjunctivae pink with no scleral jaundice. Nose: Normal mucosa and septum. NECK: Supple with no cervical or supraclavicular lymphadenopathy. Trachea is midline, healing trach stoma  EXTREMITIES: Atraumatic. Full range of motion on both upper and lower extremities, there is no tenderness to palpation, no pedal edema, no cyanosis or clubbing, no calf tenderness, normal cap refill, no joint swelling.  SKIN: Warm and dry, no erythema noted, abdomen with g tube  NEUROLOGICAL: The patient is oriented to person, TBI      LABS:    Lab Results   Component Value Date    WBC 8.3 08/06/2020    HGB 11.0 (L) 08/06/2020    HCT 34.9 (L) 08/06/2020    MCV 90 08/06/2020     08/06/2020       Results for orders placed or performed in visit on 09/17/20   Basic Metabolic Panel   Result Value Ref Range    Sodium 141 136 - 145 mmol/L    Potassium 4.5 3.5 - 5.0 mmol/L    Chloride 105 98 - 107 mmol/L    CO2 28 22 - 31 mmol/L    Anion Gap, Calculation 8 5 - 18 mmol/L    Glucose 91 70 - 125 mg/dL    Calcium 9.8 8.5 - 10.5 mg/dL    BUN 11 8 - 22 mg/dL    Creatinine 0.78 0.70 - 1.30 mg/dL    GFR MDRD Af Amer >60 >60 mL/min/1.73m2    GFR MDRD Non Af Amer >60 >60 mL/min/1.73m2           No results found for: HGBA1C  No results found for: RVICIMEL14MP  No results found for: LOPHHGTX86    ASSESSMENT/PLAN:  1. Hypoxic brain damage (H)    2. Agitation      1.Hypoxic brain injury:   We currently continues with rehab at this juncture wheelchair-bound he will fall risk due to restlessness in bed and has a scoop mattress. Nursing staff does walk him with gait belt and guidance. His ambulating has an odd goose step when walking.  Met with our therapist today, and we discussed that murali Phillips, rehab center in [place] did get back to them and have asked her all  of September notes to review Scot could be a candidate for their TBI area.  Scot has had an increase in agitation this morning have to be one-to-one with staff as he is crying and agitated      2. Agitation: Very inconsolable.  Instructed RN on duty to give an additional one-time dose of Ativan, patient was laid down and improvement noted.       Electronically signed by:  Fouzia High CNP  This progress note was completed using Dragon software and there may be grammatical errors.

## 2021-06-11 NOTE — PROGRESS NOTES
[facility]      NAME:  Scot Bishop             :  1980    MRN: [id number]    CODE STATUS:      FACILITY: [facility] [[id number]]       CHIEF COMPLAIN/REASON FOR VISIT:  Chief Complaint   Patient presents with     Problem Visit     low BP       HISTORY OF PRESENT ILLNESS: Scot Bishop is a 40 y.o. male being seen for per nursing request for low BP's.  [place] [facility] [place] to a [facility] in May 2020. At that juncture he was in cardiac cath lab and suffered a hypoxic brain injury, high temp and thrombotic event. He is seen in his room today. Non verbal and did not track with his eyes. He will make eye contact. He has repetitive movement while in bed.He has repetitive laughing today. He varies between crying and laughing per nursing.  Moving legs and arms. Per nursing he  Slid from bed over week end, no apparent injuries, they are placing a scoop mattress for preventive falls. . He will need ongoing rehab services with PT/OT/ST. Has Gtube due to his dysphagia, which reviewed that weight has been stable.. Unfortunealy due to Scot Hypoxic brain injury he will not be able to understand hygiene education and nursing will need to meet these needs for pt. Therapy continues to work with him, seen ambualting with SBA two.Staff to anticipate needs and provide good elizabeth care two times a day and prn.   continues to work with family towards DC to a group home after TCU stay as patient will be unable to care for self, remains dependant on staff for all ADL and mobility as well as GTube for feedings.. Pt does continue with rehab services    No Known Allergies:     Current Outpatient Medications   Medication Sig     acetaminophen (TYLENOL) 650 mg/20.3 mL Soln 650 mg every 6 (six) hours as needed. Via PEG-tube      amantadine HCL (SYMMETREL) 50 mg/5 mL solution 100 mg every 12 (twelve) hours. Via PEG-tube     aspirin 81 MG EC tablet 81 mg daily. Via  PEG-tube     atorvastatin (LIPITOR) 40 MG tablet 40 mg at bedtime. Via PEG-tube     bromocriptine (PARLODEL) 2.5 mg tablet 2.5 mg 2 (two) times a day. Via PEG-tube     chlorhexidine (PERIDEX) 0.12 % solution Apply 15 mL to the mouth or throat 4 (four) times a day.     levETIRAcetam (KEPPRA) 100 mg/mL solution 1,000 mg 2 (two) times a day. Via PEG-tube     lisinopriL (PRINIVIL,ZESTRIL) 2.5 MG tablet 1.25 mg daily. Via PEG-tube, hold if SBP <90     LORazepam (ATIVAN) 0.5 MG tablet 1 tablet (0.5 mg total) by G-tube route 4 (four) times a day.     metoclopramide (REGLAN) 5 MG tablet 5 mg 2 (two) times a day. Via PEG-tube     metoprolol tartrate (LOPRESSOR) 25 MG tablet 6.25 mg 2 (two) times a day. Via PEG-tube, hold if SBP <90, HR <60     neomycin-bacitracin-polymyxin B (NEOSPORIN) 3.5-400-5,000 mg-unit-unit OiPk ointment Apply 1 application topically 2 (two) times a day.     omeprazole (PRILOSEC) 2 mg/mL SusR suspension 40 mg daily before breakfast. Via PEG-tube     pantoprazole (PROTONIX) 40 MG tablet 40 mg daily. Via PEG-tube     potassium bicarb-citric acid 20 mEq TbEF 20 mEq daily. Via PEG-tube     QUEtiapine (SEROQUEL) 25 MG tablet 12.5 mg 3 (three) times a day. Via PEG-tube     ticagrelor (BRILINTA) 90 mg Tab 90 mg 2 (two) times a day. Via PEG-tube     traZODone (DESYREL) 50 MG tablet 50 mg at bedtime. Via PEG-tube     white petrolatum (AQUAPHOR ORIGINAL) 41 % Oint Apply 1 application topically 2 (two) times a day. Apply to feet/legs         REVIEW OF SYSTEMS:  Unable to verbalize due to aphasia    PHYSICAL EXAMINATION:  Vitals:    09/17/20 0649   BP: 96/60   Pulse: 69   Temp: 99.1  F (37.3  C)   Weight: 165 lb 4.8 oz (75 kg)         GENERAL: Does not follow simple commands, makes eye contact, non verbal.  HEENT: Head is normocephalic with normal hair distribution. No evidence of trauma. Ears: No acute purulent discharge. Eyes: Conjunctivae pink with no scleral jaundice. Nose: Normal mucosa and septum. NECK:  Supple with no cervical or supraclavicular lymphadenopathy. Trachea is midline, healing trach stoma  EXTREMITIES: Atraumatic. Full range of motion on both upper and lower extremities, there is no tenderness to palpation, no pedal edema, no cyanosis or clubbing, no calf tenderness, normal cap refill, no joint swelling.  SKIN: Warm and dry, no erythema noted, abdomen with 4 less erythema, improving.  NEUROLOGICAL: The patient is oriented to person, TBI      LABS:    Lab Results   Component Value Date    WBC 8.3 08/06/2020    HGB 11.0 (L) 08/06/2020    HCT 34.9 (L) 08/06/2020    MCV 90 08/06/2020     08/06/2020       Results for orders placed or performed in visit on 08/18/20   Basic Metabolic Panel   Result Value Ref Range    Sodium 141 136 - 145 mmol/L    Potassium 4.0 3.5 - 5.0 mmol/L    Chloride 104 98 - 107 mmol/L    CO2 25 22 - 31 mmol/L    Anion Gap, Calculation 12 5 - 18 mmol/L    Glucose 90 70 - 125 mg/dL    Calcium 9.5 8.5 - 10.5 mg/dL    BUN 12 8 - 22 mg/dL    Creatinine 0.81 0.70 - 1.30 mg/dL    GFR MDRD Af Amer >60 >60 mL/min/1.73m2    GFR MDRD Non Af Amer >60 >60 mL/min/1.73m2           No results found for: HGBA1C  No results found for: [id number]  No results found for: [id number]    ASSESSMENT/PLAN:  1. Hypoxic brain damage (H)    2. Hypotension due to drugs      1./Hypoxic brain injury:   We currently continues with rehab at this juncture wheelchair-bound he will fall risk due to restlessness in bed and has a scoop mattress. Nursing staff does walk him with gait belt and guidance. His ambulating has an odd goose step when walking.Has ativan due to agitation    2. BP: Runs lower , with HO HTN, Cardiac events. We will dc lasix and check BMP in am.     Electronically signed by:  Fouzia High CNP  This progress note was completed using Dragon software and there may be grammatical errors.

## 2021-06-11 NOTE — PROGRESS NOTES
[facility]      NAME:  Scot Bishop             :  1980    MRN: [id number]    CODE STATUS:      FACILITY: [facility] [[id number]]       CHIEF COMPLAIN/REASON FOR VISIT:  Chief Complaint   Patient presents with     Problem Visit     agitation       HISTORY OF PRESENT ILLNESS: Scot Bishop is a 40 y.o. male being seen for per nursing as he has recently been agitated. More awake and restless. Diversional activities limited due to brain injury.  [place] [facility] in May 2020. At that juncture he was in cardiac cath lab and suffered a hypoxic brain injury, high temp and thrombotic event. He is seen in his room today. Non verbal and did not track with his eyes. He will make eye contact. He has repetitive movement while in bed.He has repetitive laughing today. He varies between crying and laughing per nursing.  Moving legs and arms. Per nursing he  Slid from bed over week end, no apparent injuries, they are placing a scoop mattress for preventive falls. . He will need ongoing rehab services with PT/OT/ST. Has Gtube due to his dysphagia, which reviewed that weight has been stable.. Unfortunealy due to Scot Hypoxic brain injury he will not be able to understand hygiene education and nursing will need to meet these needs for pt. Therapy continues to work with him, seen ambualting with SBA two.Staff to anticipate needs and provide good elizabeth care two times a day and prn.   continues to work with family towards DC to a group home after TCU stay as patient will be unable to care for self, remains dependant on staff for all ADL and mobility as well as GTube for feedings.. Pt does continue with rehab services    No Known Allergies:     Current Outpatient Medications   Medication Sig     acetaminophen (TYLENOL) 650 mg/20.3 mL Soln 650 mg every 6 (six) hours as needed. Via PEG-tube      amantadine HCL (SYMMETREL) 50 mg/5 mL solution 100  mg every 12 (twelve) hours. Via PEG-tube     aspirin 81 MG EC tablet 81 mg daily. Via PEG-tube     atorvastatin (LIPITOR) 40 MG tablet 40 mg at bedtime. Via PEG-tube     bromocriptine (PARLODEL) 2.5 mg tablet 2.5 mg 2 (two) times a day. Via PEG-tube     chlorhexidine (PERIDEX) 0.12 % solution Apply 15 mL to the mouth or throat 4 (four) times a day.     levETIRAcetam (KEPPRA) 100 mg/mL solution 1,000 mg 2 (two) times a day. Via PEG-tube     lisinopriL (PRINIVIL,ZESTRIL) 2.5 MG tablet 1.25 mg daily. Via PEG-tube, hold if SBP <90     LORazepam (ATIVAN) 0.5 MG tablet 1 tablet (0.5 mg total) by G-tube route 4 (four) times a day.     metoclopramide (REGLAN) 5 MG tablet 5 mg 2 (two) times a day. Via PEG-tube     metoprolol tartrate (LOPRESSOR) 25 MG tablet 6.25 mg 2 (two) times a day. Via PEG-tube, hold if SBP <90, HR <60     neomycin-bacitracin-polymyxin B (NEOSPORIN) 3.5-400-5,000 mg-unit-unit OiPk ointment Apply 1 application topically 2 (two) times a day.     omeprazole (PRILOSEC) 2 mg/mL SusR suspension 40 mg daily before breakfast. Via PEG-tube     pantoprazole (PROTONIX) 40 MG tablet 40 mg daily. Via PEG-tube     potassium bicarb-citric acid 20 mEq TbEF 20 mEq daily. Via PEG-tube     QUEtiapine (SEROQUEL) 25 MG tablet 12.5 mg 3 (three) times a day. Via PEG-tube     ticagrelor (BRILINTA) 90 mg Tab 90 mg 2 (two) times a day. Via PEG-tube     traZODone (DESYREL) 50 MG tablet 50 mg at bedtime. Via PEG-tube     white petrolatum (AQUAPHOR ORIGINAL) 41 % Oint Apply 1 application topically 2 (two) times a day. Apply to feet/legs         REVIEW OF SYSTEMS:  Unable to verbalize due to aphasia    PHYSICAL EXAMINATION:  Vitals:    09/26/20 0508   BP: 99/62   Pulse: (!) 108   Temp: (!) 95.6  F (35.3  C)   Weight: 169 lb (76.7 kg)         GENERAL: Does not follow simple commands, makes eye contact, non verbal.  HEENT: Head is normocephalic with normal hair distribution. No evidence of trauma. Ears: No acute purulent discharge.  Eyes: Conjunctivae pink with no scleral jaundice. Nose: Normal mucosa and septum. NECK: Supple with no cervical or supraclavicular lymphadenopathy. Trachea is midline, healing trach stoma  EXTREMITIES: Atraumatic. Full range of motion on both upper and lower extremities, there is no tenderness to palpation, no pedal edema, no cyanosis or clubbing, no calf tenderness, normal cap refill, no joint swelling.  SKIN: Warm and dry, no erythema noted, abdomen with g tube  NEUROLOGICAL: The patient is oriented to person, TBI      LABS:    Lab Results   Component Value Date    WBC 8.3 08/06/2020    HGB 11.0 (L) 08/06/2020    HCT 34.9 (L) 08/06/2020    MCV 90 08/06/2020     08/06/2020       Results for orders placed or performed in visit on 09/17/20   Basic Metabolic Panel   Result Value Ref Range    Sodium 141 136 - 145 mmol/L    Potassium 4.5 3.5 - 5.0 mmol/L    Chloride 105 98 - 107 mmol/L    CO2 28 22 - 31 mmol/L    Anion Gap, Calculation 8 5 - 18 mmol/L    Glucose 91 70 - 125 mg/dL    Calcium 9.8 8.5 - 10.5 mg/dL    BUN 11 8 - 22 mg/dL    Creatinine 0.78 0.70 - 1.30 mg/dL    GFR MDRD Af Amer >60 >60 mL/min/1.73m2    GFR MDRD Non Af Amer >60 >60 mL/min/1.73m2           No results found for: HGBA1C  No results found for: [id number]  No results found for: [id number]    ASSESSMENT/PLAN:  1. Agitation    2. Hypoxic brain damage (H)      1/ Agitation: Much improved, on scheduled ativan, recent increase noted. Staff will take Scto for walks and offer rest periods for diversion. See problem #2, due to brain injury very low function and needs constant supervision and support.      2..Hypoxic brain injury:   We currently continues with rehab at this juncture wheelchair-bound he will fall risk due to restlessness in bed and has a scoop mattress. Nursing staff does walk him with gait belt and guidance. His ambulating has an odd goose step when walking.  Met with our therapist today, and we discussed that murali Phillips,  rehab center in [place] did get back to them and have asked her all of September notes to review Scot could be a candidate for their TBI area, no bed available at this time. Facility will work on SMERT and look at LTC placement for now.     Electronically signed by:  Fouzia High CNP  This progress note was completed using Dragon software and there may be grammatical errors.

## 2021-06-11 NOTE — PROGRESS NOTES
[facility]      NAME:  Scot Bishop             :  1980    MRN: [id number]    CODE STATUS:      FACILITY: [facility] [[id number]]       CHIEF COMPLAIN/REASON FOR VISIT:  Chief Complaint   Patient presents with     Review Of Multiple Medical Conditions     BRAIN INJURY       HISTORY OF PRESENT ILLNESS: Scot Bishop is a 40 y.o. male being seen for review of multiple medical conditions.He continues with rehab services as family is looking for dc placement. Per  he has a waiver now. IDT will meet on 9/15 to discuss more.  [place] [facility] in May 2020. At that juncture he was in cardiac cath lab and suffered a hypoxic brain injury, high temp and thrombotic event. He is seen in his room today. Non verbal and did not track with his eyes. He will make eye contact. He has repetitive movement while in bed.He has repetitive laughing today. He varies between crying and laughing per nursing.  Moving legs and arms. Per nursing he  Slid from bed over week end, no apparent injuries, they are placing a scoop mattress for preventive falls. . He will need ongoing rehab services with PT/OT/ST. Has Gtube due to his dysphagia, which reviewed that weight has been stable.. Unfortunealy due to Scot Hypoxic brain injury he will not be able to understand hygiene education and nursing will need to meet these needs for pt. Therapy continues to work with him, seen ambualting with SBA two.Staff to anticipate needs and provide good elizabeth care two times a day and prn.   continues to work with family towards DC to a group home after TCU stay as patient will be unable to care for self, remains dependant on staff for all ADL and mobility as well as GTube for feedings..    No Known Allergies:     Current Outpatient Medications   Medication Sig     acetaminophen (TYLENOL) 650 mg/20.3 mL Soln 650 mg every 6 (six) hours as needed. Via PEG-tube       amantadine HCL (SYMMETREL) 50 mg/5 mL solution 100 mg every 12 (twelve) hours. Via PEG-tube     aspirin 81 MG EC tablet 81 mg daily. Via PEG-tube     atorvastatin (LIPITOR) 40 MG tablet 40 mg at bedtime. Via PEG-tube     bromocriptine (PARLODEL) 2.5 mg tablet 2.5 mg 2 (two) times a day. Via PEG-tube     chlorhexidine (PERIDEX) 0.12 % solution Apply 15 mL to the mouth or throat 4 (four) times a day.     furosemide (LASIX) 10 mg/mL solution 10 mg every other day. Via PEG-tube, hold if SBP <90     levETIRAcetam (KEPPRA) 100 mg/mL solution 1,000 mg 2 (two) times a day. Via PEG-tube     lisinopriL (PRINIVIL,ZESTRIL) 2.5 MG tablet 1.25 mg daily. Via PEG-tube, hold if SBP <90     LORazepam (ATIVAN) 0.5 MG tablet 1 tablet (0.5 mg total) by G-tube route 4 (four) times a day.     metoclopramide (REGLAN) 5 MG tablet 5 mg 2 (two) times a day. Via PEG-tube     metoprolol tartrate (LOPRESSOR) 25 MG tablet 6.25 mg 2 (two) times a day. Via PEG-tube, hold if SBP <90, HR <60     neomycin-bacitracin-polymyxin B (NEOSPORIN) 3.5-400-5,000 mg-unit-unit OiPk ointment Apply 1 application topically 2 (two) times a day.     omeprazole (PRILOSEC) 2 mg/mL SusR suspension 40 mg daily before breakfast. Via PEG-tube     pantoprazole (PROTONIX) 40 MG tablet 40 mg daily. Via PEG-tube     potassium bicarb-citric acid 20 mEq TbEF 20 mEq daily. Via PEG-tube     QUEtiapine (SEROQUEL) 25 MG tablet 12.5 mg 3 (three) times a day. Via PEG-tube     ticagrelor (BRILINTA) 90 mg Tab 90 mg 2 (two) times a day. Via PEG-tube     traZODone (DESYREL) 50 MG tablet 50 mg at bedtime. Via PEG-tube     white petrolatum (AQUAPHOR ORIGINAL) 41 % Oint Apply 1 application topically 2 (two) times a day. Apply to feet/legs         REVIEW OF SYSTEMS:  Unable to verbalize due to aphasia    PHYSICAL EXAMINATION:  Vitals:    09/14/20 1611   BP: 98/67   Pulse: 93   Temp: 98.7  F (37.1  C)   Weight: 168 lb 3.2 oz (76.3 kg)         GENERAL: Does not follow simple commands, makes eye  contact, non verbal.  HEENT: Head is normocephalic with normal hair distribution. No evidence of trauma. Ears: No acute purulent discharge. Eyes: Conjunctivae pink with no scleral jaundice. Nose: Normal mucosa and septum. NECK: Supple with no cervical or supraclavicular lymphadenopathy. Trachea is midline, healing trach stoma  EXTREMITIES: Atraumatic. Full range of motion on both upper and lower extremities, there is no tenderness to palpation, no pedal edema, no cyanosis or clubbing, no calf tenderness, normal cap refill, no joint swelling.  SKIN: Warm and dry, no erythema noted, abdomen with 4 less erythema, improving.  NEUROLOGICAL: The patient is oriented to person, TBI      LABS:    Lab Results   Component Value Date    WBC 8.3 08/06/2020    HGB 11.0 (L) 08/06/2020    HCT 34.9 (L) 08/06/2020    MCV 90 08/06/2020     08/06/2020       Results for orders placed or performed in visit on 08/18/20   Basic Metabolic Panel   Result Value Ref Range    Sodium 141 136 - 145 mmol/L    Potassium 4.0 3.5 - 5.0 mmol/L    Chloride 104 98 - 107 mmol/L    CO2 25 22 - 31 mmol/L    Anion Gap, Calculation 12 5 - 18 mmol/L    Glucose 90 70 - 125 mg/dL    Calcium 9.5 8.5 - 10.5 mg/dL    BUN 12 8 - 22 mg/dL    Creatinine 0.81 0.70 - 1.30 mg/dL    GFR MDRD Af Amer >60 >60 mL/min/1.73m2    GFR MDRD Non Af Amer >60 >60 mL/min/1.73m2           No results found for: HGBA1C  No results found for: [id number]  No results found for: [id number]    ASSESSMENT/PLAN:  1. G tube feedings (H)    2. Dysphagia, unspecified type    3. Hypoxic brain damage (H)      1./ 2 .Gtube due to dysphagia :Wt stable at 164.8 lb. Needs ongoing SN support for feedings and med admin via tube due to dysphagia.      3.Hypoxic brain injury:   We currently continues with rehab at this juncture wheelchair-bound he will fall risk due to restlessness in bed and has a scoop mattress. Nursing staff ashley walk him with gait belt and guidance. His ambulating has an odd  goose step when walking.     Electronically signed by:  Fouzia High CNP  This progress note was completed using Dragon software and there may be grammatical errors.

## 2021-06-11 NOTE — TELEPHONE ENCOUNTER
[facility] Nurse Triage Telephone Note      Provider: Cortney Bahena MD  Facility: [facility]    Facility Type: TCU    Caller: Azra  Call Back Number:  [phone]    Allergies: Patient has no known allergies.    Reason for call: BMP- WNL   No concerns at this time.      Verbal Order/Direction given by Provider: EUSEBIOO.    Provider giving order: Cortney Bahena MD    Verbal order given to: Azra Nielsen RN

## 2021-06-11 NOTE — PROGRESS NOTES
[facility]  note      Patient: Scot Bishop  MRN: [id number]  Date of Service: 9/29/2020      [facility] [[id number]]  Reason for Visit     Chief Complaint   Patient presents with     Problem Visit   Follow-up on fever /low bp    Code Status     FULL CODE    Assessment     -Persistent hypotension low blood pressures noted-  -Low-grade temperatures of unclear etiology  -Acute VF arrest secondary to acute ST SUKI  -Acute LAD occlusion with underlying history of coronary artery disease  -Hypoxic brain injury  - Severe dysphagia currently discharged on tube feeding; now with staff reporting patient constantly pulling tube feeding out  -Aspiration pneumonia currently resolved  -Acute hypoxic respiratory failure currently resolved  -History of lengthy stay in the hospital with multiple procedures including requiring intubation and ECMO support from 5 17-5 19 with tracheostomy and PEG placement.  -LOWELL currently resolved      Plan     Patient has been admitted to the TCU as a transfer from the hospital where he had a lengthy stay of more than 2 months.  He is on medical management.  He was seen at the request of nursing because of low blood pressure   This is believed to be secondary to autonomic insufficiency.  Recently seen by nursing and the nurse practitioner and taken off Lasix.  Blood pressures continue to be labile.  Reviewed care plan with speech therapy he is scheduled for a swallow study tomorrow.    Ativan 0.5MG will be scheduled prior to the procedure as they feel he may not cooperate  As per my review of care plan with a speech therapy no cognitive improvement has been noted.  He has hypoxic brain injury but continues to have some behaviors.  Frequently has been quite agitated  Nursing is reporting some combativeness.  He has some issues with caregivers and generally will hold her arms or resist cares as per them.  I did talk to nursing to see if they can try some alternative  modalities including a weighted blanket or a sensory blanket to see if those will help him he could even get a stuffed animal to calm him down.  Currently awaiting placement in long-term care        History     Patient is a very pleasant 40 y.o. male who is admitted to TCU  Patient has been admitted after a very lengthy more than 60-day stay in the hospital.  He presented following PEA/VF arrest in the community.  He was admitted and noted to have anterior acute ST SUKI.  He had multiple episodes of cardiac arrest  He required ECMO support and was emergently taken for cardiac catheterization which showed a thrombotic occlusion of the LAD which was treated with aspiration thrombectomy and PCI with drug-eluting stent of the proximal LAD.  He has been discharged on medical management.  He was seen by the nurse practitioner and taken off the Lasix that he is on because of persistent low blood pressures.  No improvement noted continues to run low BP      He also suffered from hypoxic brain injury.  Initial CT of the head was negative but EEG shows severe diffuse encephalopathy without seizure or epilepsy.  Repeat CT did show multifocal acute/subacute cerebral infarcts.  And brain injury which is consistent with hypoxic brain injury.  No improvement noted in the TCU he continues to be pretty nonresponsive and nonverbal.    He continues to be nonresponsive I did have a discussion with the speech therapist to discuss how he is doing and apparently she has not seen any cognitive improvement so far.  Family has been visiting him now    He remains on tube feeding.  Recently has had a lot of difficulties with tube feeding.  He was sent him back in the hospital for G-tube replacement.  Due to his repeated difficulty with G-tube he has been switched from 24/7 feeding to bolus feeding during the daytime which has worked much better.  He is now scheduled for a swallow test tomorrow speech therapy did talk to me they feel he will not  participate really sedated with Ativan    He has had multiple falls in the TCU.  Placement efforts have been unsuccessful and finally has been transferred to mattress on the floor.  Unfortunately remains hard to care for in light of his profound cognitiv impairment.  He continues to have significant autonomic instability with frequent low temperatures and high temperatures noted.  He has had some behavioral issues which are reviewed with nursing also          Past Medical History     Active Ambulatory (Non-Hospital) Problems    Diagnosis     Agitation     Low BP     G tube feedings (H)     Yeast infection     Impetigo any site     Hypoxic brain damage (H)     Acute respiratory failure with hypoxia (H)     Gastrojejunostomy tube status (H)     Hypoxic brain injury (H)     Dysphagia     Cardiac arrest (H)     Primary Lymphadenitis     Nicotine Dependence     Backache     Past Medical History:   Diagnosis Date     Acute respiratory failure with hypoxia (H)      LOWELL (acute kidney injury) (H)      Cardiac arrest (H)      Dysphagia      HTN (hypertension)      Hypoxic ischemic encephalopathy      NSTEMI (non-ST elevated myocardial infarction) (H)        Past Social History     Reviewed, and he  reports that he has been smoking. He has never used smokeless tobacco. He reports current alcohol use. He reports that he does not use drugs.    Family History     Reviewed, and family history includes Diabetes in his maternal aunt; Lung cancer in his maternal grandfather; Other in his father.    Medication List   Post Discharge Medication Reconciliation Status: discharge medications reconciled, continue medications without change   Current Outpatient Medications on File Prior to Visit   Medication Sig Dispense Refill     acetaminophen (TYLENOL) 650 mg/20.3 mL Soln 650 mg every 6 (six) hours as needed. Via PEG-tube        amantadine HCL (SYMMETREL) 50 mg/5 mL solution 100 mg every 12 (twelve) hours. Via PEG-tube       aspirin 81 MG  EC tablet 81 mg daily. Via PEG-tube       atorvastatin (LIPITOR) 40 MG tablet 40 mg at bedtime. Via PEG-tube       bromocriptine (PARLODEL) 2.5 mg tablet 2.5 mg 2 (two) times a day. Via PEG-tube       chlorhexidine (PERIDEX) 0.12 % solution Apply 15 mL to the mouth or throat 4 (four) times a day.       levETIRAcetam (KEPPRA) 100 mg/mL solution 1,000 mg 2 (two) times a day. Via PEG-tube       lisinopriL (PRINIVIL,ZESTRIL) 2.5 MG tablet 1.25 mg daily. Via PEG-tube, hold if SBP <90       LORazepam (ATIVAN) 0.5 MG tablet 1 tablet (0.5 mg total) by G-tube route 4 (four) times a day. 120 tablet 0     metoclopramide (REGLAN) 5 MG tablet 5 mg 2 (two) times a day. Via PEG-tube       metoprolol tartrate (LOPRESSOR) 25 MG tablet 6.25 mg 2 (two) times a day. Via PEG-tube, hold if SBP <90, HR <60       neomycin-bacitracin-polymyxin B (NEOSPORIN) 3.5-400-5,000 mg-unit-unit OiPk ointment Apply 1 application topically 2 (two) times a day.       omeprazole (PRILOSEC) 2 mg/mL SusR suspension 40 mg daily before breakfast. Via PEG-tube       pantoprazole (PROTONIX) 40 MG tablet 40 mg daily. Via PEG-tube       potassium bicarb-citric acid 20 mEq TbEF 20 mEq daily. Via PEG-tube       QUEtiapine (SEROQUEL) 25 MG tablet 12.5 mg 3 (three) times a day. Via PEG-tube       ticagrelor (BRILINTA) 90 mg Tab 90 mg 2 (two) times a day. Via PEG-tube       traZODone (DESYREL) 50 MG tablet 50 mg at bedtime. Via PEG-tube       white petrolatum (AQUAPHOR ORIGINAL) 41 % Oint Apply 1 application topically 2 (two) times a day. Apply to feet/legs       No current facility-administered medications on file prior to visit.        Allergies     No Known Allergies    Review of Systems   A comprehensive review of 14 systems was done. Pertinent findings noted here and in history of present illness. All the rest negative.  Constitutional: Negative.  Negative for fever, chills, he has activity change, appetite change and fatigue.   He has now been walking and taking  a few steps.  He has been talking also  HENT: Negative for congestion and facial swelling.    Eyes: Negative for photophobia, redness and visual disturbance.   Respiratory: Negative for cough and chest tightness.    Cardiovascular: Negative for chest pain, palpitations and leg swelling.   Gastrointestinal: Negative for nausea, diarrhea, constipation, blood in stool and abdominal distention.   Genitourinary: Negative.    Musculoskeletal: Negative.  Hard to assess he does move his legs and arms spontaneously but not to command  Skin: Negative.    Neurological: Negative for dizziness, tremors, syncope, weakness, light-headedness and headaches.   Constant scissoring like movement of his legs and arms noted.  Nonresponsive  Hematological: Does not bruise/bleed easily.   Psychiatric/Behavioral: Negative.  Unable to assess as patient does not cooperate  However today noted to be talking spontaneously  Staff reporting that he is not sleeping well.  He has significant anxiety and frequently rolls out of bed      Physical Exam     Recent Vitals 9/27/2020   Weight 167 lbs   BP 98/64   Pulse 80   Temp 98.2   Some recent data might be hidden   Weight is 168 pounds blood pressure 80/46 temp 99  pulse 105    Constitutional:  appears well-developed.   HEENT:  Normocephalic and atraumatic.  Eyes: Conjunctivae and EOM are normal. Pupils are equal, round, and reactive to light. No discharge.  No scleral icterus. Nose normal. Mouth/Throat: Oropharynx is clear and moist. No oropharyngeal exudate.    NECK: Normal range of motion. Neck supple. No JVD present. No tracheal deviation present. No thyromegaly present.   CARDIOVASCULAR: Normal rate, regular rhythm and intact distal pulses.  Exam reveals no gallop and no friction rub.  Systolic murmur present.  PULMONARY: Effort normal and breath sounds normal. No respiratory distress.No Wheezing or rales.  ABDOMEN: Soft. Bowel sounds are normal. No distension and no mass.  There is no  tenderness. There is no rebound and no guarding. No HSM.  Does have a G-tube which is somewhat sore around the insertion site  MUSCULOSKELETAL: Normal range of motion. No edema and no tenderness. Mild kyphosis, no tenderness.  Hard to assess as patient does not respond to verbal commands or follow them.  He seems to be spontaneously moving his arms and legs though  LYMPH NODES: Has no cervical, supraclavicular, axillary and groin adenopathy.   NEUROLOGICAL: Alert and oriented to NONE. No cranial nerve deficit.  Normal muscle tone. Coordination normal.   He does not make any eye contact.  Does not follow verbal commands either  GENITOURINARY: Deferred exam.  SKIN: Skin is warm and dry. No rash noted. No erythema. No pallor.   Excoriation of the skin in the abdomen noted  EXTREMITIES: No cyanosis, no clubbing, no edema. No Deformity.  PSYCHIATRIC: mood, affect and behavior is hard to assess because of noncommunication.      Lab Results     Results for orders placed or performed in visit on 09/17/20   Basic Metabolic Panel   Result Value Ref Range    Sodium 141 136 - 145 mmol/L    Potassium 4.5 3.5 - 5.0 mmol/L    Chloride 105 98 - 107 mmol/L    CO2 28 22 - 31 mmol/L    Anion Gap, Calculation 8 5 - 18 mmol/L    Glucose 91 70 - 125 mg/dL    Calcium 9.8 8.5 - 10.5 mg/dL    BUN 11 8 - 22 mg/dL    Creatinine 0.78 0.70 - 1.30 mg/dL    GFR MDRD Af Amer >60 >60 mL/min/1.73m2    GFR MDRD Non Af Amer >60 >60 mL/min/1.73m2             DARIO Vargas

## 2021-06-11 NOTE — PROGRESS NOTES
[facility]      NAME:  Scot Bishop             :  1980    MRN: [id number]    CODE STATUS:      FACILITY: [facility] [[id number]]       CHIEF COMPLAIN/REASON FOR VISIT:  Chief Complaint   Patient presents with     Review Of Multiple Medical Conditions     rehab progress       HISTORY OF PRESENT ILLNESS: Scot Bishop is a 40 y.o. male being seen per nursing request for abdominal skin impairment and yeast like rash to buttocks. . Started on abx this week for impetigo.  He has no known medical past as per his EMR from [facility] prior to a NStemi in May 2020. At that juncture he was in cardiac cath lab and suffered a hypoxic brain injury, high temp and thrombotic event. He is seen in his room today. Non verbal and did not track with his eyes. He will make eye contact. He has repetitive movement while in bed.He has repetitive laughing today. He varies between crying and laughing per nursing.  Moving legs and arms. Per nursing he  Slid from bed over week end, no apparent injuries, they are placing a scoop mattress for preventive falls. . He will need ongoing rehab services with PT/OT/ST. Has Gtube de to his dysphagia. Unfortunealy due to Scot Hypoxic brain injury he will not be able to understand hygiene education and nursing will need to meet these needs for pt. Therapy continues to work with him, seen ambualting with SBA two.    No Known Allergies:     Current Outpatient Medications   Medication Sig     acetaminophen (TYLENOL) 650 mg/20.3 mL Soln 650 mg every 6 (six) hours as needed. Via PEG-tube      amantadine HCL (SYMMETREL) 50 mg/5 mL solution 100 mg every 12 (twelve) hours. Via PEG-tube     aspirin 81 MG EC tablet 81 mg daily. Via PEG-tube     atorvastatin (LIPITOR) 40 MG tablet 40 mg at bedtime. Via PEG-tube     bromocriptine (PARLODEL) 2.5 mg tablet 2.5 mg 2 (two) times a day. Via PEG-tube     chlorhexidine (PERIDEX) 0.12 % solution Apply 15 mL to the mouth  or throat 4 (four) times a day.     furosemide (LASIX) 10 mg/mL solution 10 mg every other day. Via PEG-tube, hold if SBP <90     levETIRAcetam (KEPPRA) 100 mg/mL solution 1,000 mg 2 (two) times a day. Via PEG-tube     lisinopriL (PRINIVIL,ZESTRIL) 2.5 MG tablet 1.25 mg daily. Via PEG-tube, hold if SBP <90     LORazepam (ATIVAN) 0.5 MG tablet 1 tablet (0.5 mg total) by G-tube route 4 (four) times a day.     metoclopramide (REGLAN) 5 MG tablet 5 mg 2 (two) times a day. Via PEG-tube     metoprolol tartrate (LOPRESSOR) 25 MG tablet 6.25 mg 2 (two) times a day. Via PEG-tube, hold if SBP <90, HR <60     neomycin-bacitracin-polymyxin B (NEOSPORIN) 3.5-400-5,000 mg-unit-unit OiPk ointment Apply 1 application topically 2 (two) times a day.     omeprazole (PRILOSEC) 2 mg/mL SusR suspension 40 mg daily before breakfast. Via PEG-tube     pantoprazole (PROTONIX) 40 MG tablet 40 mg daily. Via PEG-tube     potassium bicarb-citric acid 20 mEq TbEF 20 mEq daily. Via PEG-tube     QUEtiapine (SEROQUEL) 25 MG tablet 12.5 mg 3 (three) times a day. Via PEG-tube     ticagrelor (BRILINTA) 90 mg Tab 90 mg 2 (two) times a day. Via PEG-tube     traZODone (DESYREL) 50 MG tablet 50 mg at bedtime. Via PEG-tube     white petrolatum (AQUAPHOR ORIGINAL) 41 % Oint Apply 1 application topically 2 (two) times a day. Apply to feet/legs         REVIEW OF SYSTEMS:  Unable to verbalize due to aphasia    PHYSICAL EXAMINATION:  Vitals:    08/31/20 1719   BP: 108/64   Pulse: 99   Temp: (!) 96  F (35.6  C)   Weight: 174 lb (78.9 kg)         GENERAL: Does not follow simple commands, makes eye contact, non verbal.  HEENT: Head is normocephalic with normal hair distribution. No evidence of trauma. Ears: No acute purulent discharge. Eyes: Conjunctivae pink with no scleral jaundice. Nose: Normal mucosa and septum. NECK: Supple with no cervical or supraclavicular lymphadenopathy. Trachea is midline, healing trach stoma  EXTREMITIES: Atraumatic. Full range of  motion on both upper and lower extremities, there is no tenderness to palpation, no pedal edema, no cyanosis or clubbing, no calf tenderness, normal cap refill, no joint swelling.  SKIN: Warm and dry, no erythema noted, abdomen with raw scratched irritated areas and pus like dried scabs, less erythema, improving.  NEUROLOGICAL: The patient is oriented to person, TBI      LABS:    Lab Results   Component Value Date    WBC 8.3 08/06/2020    HGB 11.0 (L) 08/06/2020    HCT 34.9 (L) 08/06/2020    MCV 90 08/06/2020     08/06/2020       Results for orders placed or performed in visit on 08/18/20   Basic Metabolic Panel   Result Value Ref Range    Sodium 141 136 - 145 mmol/L    Potassium 4.0 3.5 - 5.0 mmol/L    Chloride 104 98 - 107 mmol/L    CO2 25 22 - 31 mmol/L    Anion Gap, Calculation 12 5 - 18 mmol/L    Glucose 90 70 - 125 mg/dL    Calcium 9.5 8.5 - 10.5 mg/dL    BUN 12 8 - 22 mg/dL    Creatinine 0.81 0.70 - 1.30 mg/dL    GFR MDRD Af Amer >60 >60 mL/min/1.73m2    GFR MDRD Non Af Amer >60 >60 mL/min/1.73m2           No results found for: HGBA1C  No results found for: [id number]  No results found for: VIHIYJQK11    ASSESSMENT/PLAN:  1. Hypoxic brain injury (H)    2. Impetigo any site      1.Hypoxic brain injury: Continues to work with therapies, looking at increased therapy dc to [facility].    2 . Impetigo: Current treatment of augmentin per GT and bacatracin topical has demonstrated effective and skin impairment improving. Encourage binder on to protect skin. We will place a stop date on his Augmentin today but please continue with bacitracin to open areas of skin on abdomin.    3. Buttocks: Add nystatin ointment on buttocks along with barrier cream two times a day.     Electronically signed by:  Fouzia High CNP  This progress note was completed using Dragon software and there may be grammatical errors.

## 2021-06-11 NOTE — PROGRESS NOTES
[facility]      NAME:  Scot Bishop             :  1980    MRN: [id number]    CODE STATUS:      FACILITY: [facility] [[id number]]       CHIEF COMPLAIN/REASON FOR VISIT:  Chief Complaint   Patient presents with     Review Of Multiple Medical Conditions     TBI       HISTORY OF PRESENT ILLNESS: Scot Bishop is a 40 y.o. male being seen for review of multiple medical conditions.  [facility] in May 2020. At that juncture he was in cardiac cath lab and suffered a hypoxic brain injury, high temp and thrombotic event. He is seen in his room today. Non verbal and did not track with his eyes. He will make eye contact. He has repetitive movement while in bed.He has repetitive laughing today. He varies between crying and laughing per nursing.  Moving legs and arms. Per nursing he  Slid from bed over week end, no apparent injuries, they are placing a scoop mattress for preventive falls. . He will need ongoing rehab services with PT/OT/ST. Has Gtube due to his dysphagia, which reviewed that weight has been stable.. Unfortunealy due to Scot Hypoxic brain injury he will not be able to understand hygiene education and nursing will need to meet these needs for pt. Therapy continues to work with him, seen ambualting with SBA two.Staff to anticipate needs and provide good elizabeth care two times a day and prn.   continues to work with family towards DC to a group home after TCU stay as patient will be unable to care for self.    No Known Allergies:     Current Outpatient Medications   Medication Sig     acetaminophen (TYLENOL) 650 mg/20.3 mL Soln 650 mg every 6 (six) hours as needed. Via PEG-tube      amantadine HCL (SYMMETREL) 50 mg/5 mL solution 100 mg every 12 (twelve) hours. Via PEG-tube     aspirin 81 MG EC tablet 81 mg daily. Via PEG-tube     atorvastatin (LIPITOR) 40 MG tablet 40 mg at bedtime. Via PEG-tube     bromocriptine  (PARLODEL) 2.5 mg tablet 2.5 mg 2 (two) times a day. Via PEG-tube     chlorhexidine (PERIDEX) 0.12 % solution Apply 15 mL to the mouth or throat 4 (four) times a day.     furosemide (LASIX) 10 mg/mL solution 10 mg every other day. Via PEG-tube, hold if SBP <90     levETIRAcetam (KEPPRA) 100 mg/mL solution 1,000 mg 2 (two) times a day. Via PEG-tube     lisinopriL (PRINIVIL,ZESTRIL) 2.5 MG tablet 1.25 mg daily. Via PEG-tube, hold if SBP <90     LORazepam (ATIVAN) 0.5 MG tablet 1 tablet (0.5 mg total) by G-tube route 4 (four) times a day.     metoclopramide (REGLAN) 5 MG tablet 5 mg 2 (two) times a day. Via PEG-tube     metoprolol tartrate (LOPRESSOR) 25 MG tablet 6.25 mg 2 (two) times a day. Via PEG-tube, hold if SBP <90, HR <60     neomycin-bacitracin-polymyxin B (NEOSPORIN) 3.5-400-5,000 mg-unit-unit OiPk ointment Apply 1 application topically 2 (two) times a day.     omeprazole (PRILOSEC) 2 mg/mL SusR suspension 40 mg daily before breakfast. Via PEG-tube     pantoprazole (PROTONIX) 40 MG tablet 40 mg daily. Via PEG-tube     potassium bicarb-citric acid 20 mEq TbEF 20 mEq daily. Via PEG-tube     QUEtiapine (SEROQUEL) 25 MG tablet 12.5 mg 3 (three) times a day. Via PEG-tube     ticagrelor (BRILINTA) 90 mg Tab 90 mg 2 (two) times a day. Via PEG-tube     traZODone (DESYREL) 50 MG tablet 50 mg at bedtime. Via PEG-tube     white petrolatum (AQUAPHOR ORIGINAL) 41 % Oint Apply 1 application topically 2 (two) times a day. Apply to feet/legs         REVIEW OF SYSTEMS:  Unable to verbalize due to aphasia    PHYSICAL EXAMINATION:  Vitals:    09/13/20 1530   BP: 102/68   Pulse: 86   Temp: 97.2  F (36.2  C)   Weight: 169 lb 3.2 oz (76.7 kg)         GENERAL: Does not follow simple commands, makes eye contact, non verbal.  HEENT: Head is normocephalic with normal hair distribution. No evidence of trauma. Ears: No acute purulent discharge. Eyes: Conjunctivae pink with no scleral jaundice. Nose: Normal mucosa and septum. NECK:  Supple with no cervical or supraclavicular lymphadenopathy. Trachea is midline, healing trach stoma  EXTREMITIES: Atraumatic. Full range of motion on both upper and lower extremities, there is no tenderness to palpation, no pedal edema, no cyanosis or clubbing, no calf tenderness, normal cap refill, no joint swelling.  SKIN: Warm and dry, no erythema noted, abdomen with 4 less erythema, improving.  NEUROLOGICAL: The patient is oriented to person, TBI      LABS:    Lab Results   Component Value Date    WBC 8.3 08/06/2020    HGB 11.0 (L) 08/06/2020    HCT 34.9 (L) 08/06/2020    MCV 90 08/06/2020     08/06/2020       Results for orders placed or performed in visit on 08/18/20   Basic Metabolic Panel   Result Value Ref Range    Sodium 141 136 - 145 mmol/L    Potassium 4.0 3.5 - 5.0 mmol/L    Chloride 104 98 - 107 mmol/L    CO2 25 22 - 31 mmol/L    Anion Gap, Calculation 12 5 - 18 mmol/L    Glucose 90 70 - 125 mg/dL    Calcium 9.5 8.5 - 10.5 mg/dL    BUN 12 8 - 22 mg/dL    Creatinine 0.81 0.70 - 1.30 mg/dL    GFR MDRD Af Amer >60 >60 mL/min/1.73m2    GFR MDRD Non Af Amer >60 >60 mL/min/1.73m2           No results found for: HGBA1C  No results found for: [id number]  No results found for: [id number]    ASSESSMENT/PLAN:  1. Dysphagia, unspecified type    2. G tube feedings (H)    3. Hypoxic brain damage (H)      1./ 2 .Gtube DUE TO DYSPHAGIA :Wt stable at 164.8 lb, occasional loose stools, is incont of both B and B.Now with bolus feedings vs continuous.Gtube site is clean w/o redness.     3.Hypoxic brain injury: I met with  today and patient has had a waiver for TBI and Medicaid approved, family and process of looking for group home as patient will need ongoing care after DC.  We currently continues with rehab at this juncture wheelchair-bound he will fall risk due to restlessness in bed and has a school mattress.       Electronically signed by:  Fouzia High CNP  This progress note was completed  using Dragon software and there may be grammatical errors.

## 2021-06-11 NOTE — PROGRESS NOTES
[facility]  note      Patient: Scot Bishop  MRN: [id number]  Date of Service: 10/1/2020      [facility] [[id number]]  Reason for Visit     Chief Complaint   Patient presents with     Problem Visit   Follow-up on fever /low bp/agitation    Code Status     FULL CODE    Assessment     -Increasing agitation due to which patient is refusing cares  -Persistent hypotension low blood pressures noted-  -Low-grade temperatures of unclear etiology  -Acute VF arrest secondary to acute ST SUKI  -Acute LAD occlusion with underlying history of coronary artery disease  -Hypoxic brain injury  - Severe dysphagia currently discharged on tube feeding; now with staff reporting patient constantly pulling tube feeding out  -Aspiration pneumonia currently resolved  -Acute hypoxic respiratory failure currently resolved  -History of lengthy stay in the hospital with multiple procedures including requiring intubation and ECMO support from 5 17-5 19 with tracheostomy and PEG placement.  -LOWELL currently resolved      Plan     Patient has been admitted to the TCU as a transfer from the hospital where he had a lengthy stay of more than 2 months.  He is on medical management.  He was seen today at the request of nursing for requesting some medication for behavioral management.  Today they could not get any blood pressures on him.  He was scheduled for a bath and would not let staff take care of his needs due to increasing behavioral dysregulation with agitation.  He is on scheduled Seroquel.  He has been given a weighted blanket with some improvement but unfortunately nursing believes that without additional meds his cares has been compromised.  There is also some concern about hygiene.  He is on bromocriptine twice daily which is supposed to help with outcomes after brain injury but so far not much improvement noted  Similarly continues on amantadine for the same reason.  Speech therapy has been working with  him.  He is also on psychotropic meds including trazodone and schedule Seroquel 3 times daily.  Plan is to add Seroquel 25 mg at 9 AM.  Hoping that this will help nursing to his at their cares including any hygiene cares as well as vital checks and other issues during this time without the patient resisting.  They will let me know.  Also requesting some alternative management including using weighted blanket as well as a stuffed animal which I think could help calm down his anxiety/agitation.  Family is looking at long-term placement for him      History     Patient is a very pleasant 40 y.o. male who is admitted to TCU  Patient has been admitted after a very lengthy more than 60-day stay in the hospital.  He presented following PEA/VF arrest in the community.  He was admitted and noted to have anterior acute ST SUKI.  He had multiple episodes of cardiac arrest  He required ECMO support and was emergently taken for cardiac catheterization which showed a thrombotic occlusion of the LAD which was treated with aspiration thrombectomy and PCI with drug-eluting stent of the proximal LAD.  Currently he is on medical management.  Weights have been stable.    He also suffered from hypoxic brain injury.  Initial CT of the head was negative but EEG shows severe diffuse encephalopathy without seizure or epilepsy.  Repeat CT did show multifocal acute/subacute cerebral infarcts.  And brain injury which is consistent with hypoxic brain injury.  No improvement noted.  Unfortunately he is having increasing behavioral dysregulation and noted to be very difficult to care for recently had a discussion with the nurse manager and he has been given a sensory blanket to help with his agitation.  Today he would not let staff check his blood pressures.  Today was his birthday and staff was unsuccessful.  They are wondering if they will be able to get to his cares because of his agitation.  Redirection does not easily work for him  either    He remains on tube feeding.  Not much improvement noted he had a swallow study done recently will await the recommendations to see if he is ready for any oral intake    Blood pressures continue to be labile with lows and highs noted he is on a much lower dose of medications now.  Unfortunately is not able to tolerate them because of autonomic insufficiency resulting in labile blood pressures          Past Medical History     Active Ambulatory (Non-Hospital) Problems    Diagnosis     Agitation     Low BP     G tube feedings (H)     Yeast infection     Impetigo any site     Hypoxic brain damage (H)     Acute respiratory failure with hypoxia (H)     Gastrojejunostomy tube status (H)     Hypoxic brain injury (H)     Dysphagia     Cardiac arrest (H)     Primary Lymphadenitis     Nicotine Dependence     Backache     Past Medical History:   Diagnosis Date     Acute respiratory failure with hypoxia (H)      LOWELL (acute kidney injury) (H)      Cardiac arrest (H)      Dysphagia      HTN (hypertension)      Hypoxic ischemic encephalopathy      NSTEMI (non-ST elevated myocardial infarction) (H)        Past Social History     Reviewed, and he  reports that he has been smoking. He has never used smokeless tobacco. He reports current alcohol use. He reports that he does not use drugs.    Family History     Reviewed, and family history includes Diabetes in his maternal aunt; Lung cancer in his maternal grandfather; Other in his father.    Medication List   Post Discharge Medication Reconciliation Status: discharge medications reconciled, continue medications without change   Current Outpatient Medications on File Prior to Visit   Medication Sig Dispense Refill     acetaminophen (TYLENOL) 650 mg/20.3 mL Soln 650 mg every 6 (six) hours as needed. Via PEG-tube        amantadine HCL (SYMMETREL) 50 mg/5 mL solution 100 mg every 12 (twelve) hours. Via PEG-tube       aspirin 81 MG EC tablet 81 mg daily. Via PEG-tube        atorvastatin (LIPITOR) 40 MG tablet 40 mg at bedtime. Via PEG-tube       bromocriptine (PARLODEL) 2.5 mg tablet 2.5 mg 2 (two) times a day. Via PEG-tube       chlorhexidine (PERIDEX) 0.12 % solution Apply 15 mL to the mouth or throat 4 (four) times a day.       levETIRAcetam (KEPPRA) 100 mg/mL solution 1,000 mg 2 (two) times a day. Via PEG-tube       lisinopriL (PRINIVIL,ZESTRIL) 2.5 MG tablet 1.25 mg daily. Via PEG-tube, hold if SBP <90       LORazepam (ATIVAN) 0.5 MG tablet 1 tablet (0.5 mg total) by G-tube route 4 (four) times a day. 120 tablet 0     metoclopramide (REGLAN) 5 MG tablet 5 mg 2 (two) times a day. Via PEG-tube       metoprolol tartrate (LOPRESSOR) 25 MG tablet 6.25 mg 2 (two) times a day. Via PEG-tube, hold if SBP <90, HR <60       neomycin-bacitracin-polymyxin B (NEOSPORIN) 3.5-400-5,000 mg-unit-unit OiPk ointment Apply 1 application topically 2 (two) times a day.       omeprazole (PRILOSEC) 2 mg/mL SusR suspension 40 mg daily before breakfast. Via PEG-tube       pantoprazole (PROTONIX) 40 MG tablet 40 mg daily. Via PEG-tube       potassium bicarb-citric acid 20 mEq TbEF 20 mEq daily. Via PEG-tube       QUEtiapine (SEROQUEL) 25 MG tablet 12.5 mg 3 (three) times a day. Via PEG-tube       ticagrelor (BRILINTA) 90 mg Tab 90 mg 2 (two) times a day. Via PEG-tube       traZODone (DESYREL) 50 MG tablet 50 mg at bedtime. Via PEG-tube       white petrolatum (AQUAPHOR ORIGINAL) 41 % Oint Apply 1 application topically 2 (two) times a day. Apply to feet/legs       No current facility-administered medications on file prior to visit.        Allergies     No Known Allergies    Review of Systems   A comprehensive review of 14 systems was done. Pertinent findings noted here and in history of present illness. All the rest negative.  Constitutional: Negative.  Negative for fever, chills, he has activity change, appetite change and fatigue.   He has now been walking and taking a few steps.  He has been talking  also  HENT: Negative for congestion and facial swelling.    Eyes: Negative for photophobia, redness and visual disturbance.   Respiratory: Negative for cough and chest tightness.    Cardiovascular: Negative for chest pain, palpitations and leg swelling.   Gastrointestinal: Negative for nausea, diarrhea, constipation, blood in stool and abdominal distention.   Genitourinary: Negative.    Musculoskeletal: Negative.  Hard to assess he does move his legs and arms spontaneously but not to command  Skin: Negative.    Neurological: Negative for dizziness, tremors, syncope, weakness, light-headedness and headaches.   Constant scissoring like movement of his legs and arms noted.  Nonresponsive  Hematological: Does not bruise/bleed easily.   Psychiatric/Behavioral: Negative.  Unable to assess as patient does not cooperate  However today noted to be talking spontaneously  Staff reporting that he is not sleeping well.  He has significant anxiety and frequently rolls out of bed      Physical Exam     Recent Vitals 9/27/2020   Weight 167 lbs   BP 98/64   Pulse 80   Temp 98.2   Some recent data might be hidden   Weight is 168 pounds blood pressure 80/46 temp 99  pulse 105    Constitutional:  appears well-developed.   HEENT:  Normocephalic and atraumatic.  Eyes: Conjunctivae and EOM are normal. Pupils are equal, round, and reactive to light. No discharge.  No scleral icterus. Nose normal. Mouth/Throat: Oropharynx is clear and moist. No oropharyngeal exudate.    NECK: Normal range of motion. Neck supple. No JVD present. No tracheal deviation present. No thyromegaly present.   CARDIOVASCULAR: Normal rate, regular rhythm and intact distal pulses.  Exam reveals no gallop and no friction rub.  Systolic murmur present.  PULMONARY: Effort normal and breath sounds normal. No respiratory distress.No Wheezing or rales.  ABDOMEN: Soft. Bowel sounds are normal. No distension and no mass.  There is no tenderness. There is no rebound and no  guarding. No HSM.  Does have a G-tube   MUSCULOSKELETAL: Normal range of motion. No edema and no tenderness. Mild kyphosis, no tenderness.  Hard to assess as patient does not respond to verbal commands or follow them.  He seems to be spontaneously moving his arms and legs though  LYMPH NODES: Has no cervical, supraclavicular, axillary and groin adenopathy.   NEUROLOGICAL: Alert and oriented to NONE. No cranial nerve deficit.  ABNormal muscle tone. Coordination normal.   Continues to have constant jerking scissoring movement of his arms and legs  He does not make any eye contact.  Does not follow verbal commands either  GENITOURINARY: Deferred exam.  SKIN: Skin is warm and dry. No rash noted. No erythema. No pallor.   Excoriation of the skin in the abdomen noted  EXTREMITIES: No cyanosis, no clubbing, no edema. No Deformity.  PSYCHIATRIC: mood, affect and behavior is hard to assess because of noncommunication.      Lab Results     Results for orders placed or performed in visit on 09/17/20   Basic Metabolic Panel   Result Value Ref Range    Sodium 141 136 - 145 mmol/L    Potassium 4.5 3.5 - 5.0 mmol/L    Chloride 105 98 - 107 mmol/L    CO2 28 22 - 31 mmol/L    Anion Gap, Calculation 8 5 - 18 mmol/L    Glucose 91 70 - 125 mg/dL    Calcium 9.8 8.5 - 10.5 mg/dL    BUN 11 8 - 22 mg/dL    Creatinine 0.78 0.70 - 1.30 mg/dL    GFR MDRD Af Amer >60 >60 mL/min/1.73m2    GFR MDRD Non Af Amer >60 >60 mL/min/1.73m2             DARIO Vargas

## 2021-06-11 NOTE — PROGRESS NOTES
[facility]  note      Patient: Scot Bishop  MRN: [id number]  Date of Service: 9/8/2020      [facility] [[id number]]  Reason for Visit     Chief Complaint   Patient presents with     Review Of Multiple Medical Conditions   Follow-up on fever /low bp    Code Status     FULL CODE    Assessment     -Low-grade temperatures of unclear etiology  -Persistent hypotension low blood pressures noted-80/47  -Acute VF arrest secondary to acute ST SUKI  -Acute LAD occlusion with underlying history of coronary artery disease  -Hypoxic brain injury  - Severe dysphagia currently discharged on tube feeding; now with staff reporting patient constantly pulling tube feeding out  -Aspiration pneumonia currently resolved  -Acute hypoxic respiratory failure currently resolved  -History of lengthy stay in the hospital with multiple procedures including requiring intubation and ECMO support from 5 17-5 19 with tracheostomy and PEG placement.  -LOWELL currently resolved      Plan     Patient has been admitted to the TCU as a transfer from the hospital where he had a lengthy stay of more than 2 months.  He is on medical management.  He was seen at the request of nursing because of low blood pressure of 80/47.  Unfortunately no meaningful history could be obtained as he remains asymptomatic.  He was not given his metoprolol based on hold parameters and will continue to be monitored.  He continues to have frequent episodes of hypotension noted in the TCU I suspect this is part of his autonomic insufficiency with other parameters which are unstable to including frequent episodes of tachycardia and fever.  He is clinically asymptomatic.  He also today has low blood pressures associated with temperature of 99 as well as tachycardia also suspect this is part of his autonomic instability.  We will monitor him clinically if there is a concern check labs for sepsis but clinically does not appear to be septic  Cognitively  remains a poor historian with no meaningful recovery noted he is awaiting placement most likely in a group home  Nursing and dietary consultation requested to enhance his fluid intake however because of underlying history of CHF he needs to be monitored very closely    History     Patient is a very pleasant 40 y.o. male who is admitted to TCU  Patient has been admitted after a very lengthy more than 60-day stay in the hospital.  He presented following PEA/VF arrest in the community.  He was admitted and noted to have anterior acute ST SUKI.  He had multiple episodes of cardiac arrest  He required ECMO support and was emergently taken for cardiac catheterization which showed a thrombotic occlusion of the LAD which was treated with aspiration thrombectomy and PCI with drug-eluting stent of the proximal LAD.  He has been discharged on medical management.  He was evaluated at the request of nursing because of very low blood pressures  Check was 80/47.  Unfortunately he continues to have very unstable vitals with tachycardia and hypotension noted nursing was advised to hold his metoprolol as per parameters which they have been doing.      He also suffered from hypoxic brain injury.  Initial CT of the head was negative but EEG shows severe diffuse encephalopathy without seizure or epilepsy.  Repeat CT did show multifocal acute/subacute cerebral infarcts.  And brain injury which is consistent with hypoxic brain injury.  No improvement noted in the TCU he continues to be pretty nonresponsive and nonverbal.    No meaningful recovery has been noted however he has had some improvement and is occasionally noted to be ambulating with improvement but remains bedbound remains a very high fall risk he will require a group home placement at discharge    He remains on tube feeding.  Recently has had a lot of difficulties with tube feeding.  He was sent him back in the hospital for G-tube replacement.  Due to his repeated difficulty  with G-tube he has been switched from 24/7 feeding to bolus feeding during the daytime which has worked much better.  All his meds are given via tube as he does not swallow well either  Unfortunately no improvement noted in his dysphagia he is not going to be a candidate for eating orally at all all his nutritional needs will have to be met through the tube    He has had multiple falls in the TCU.  Placement efforts have been unsuccessful and finally has been transferred to mattress on the floor.  Unfortunately remains hard to care for in light of his profound cognitiv impairment.  He continues to have significant autonomic instability with frequent low temperatures and high temperatures noted.  He has been afebrile recently but occasionally has had high blood pressures also.  He is asymptomatic          Past Medical History     Active Ambulatory (Non-Hospital) Problems    Diagnosis     G tube feedings (H)     Yeast infection     Impetigo any site     Hypoxic brain damage (H)     Acute respiratory failure with hypoxia (H)     Gastrojejunostomy tube status (H)     Hypoxic brain injury (H)     Dysphagia     Cardiac arrest (H)     Primary Lymphadenitis     Nicotine Dependence     Backache     Past Medical History:   Diagnosis Date     Acute respiratory failure with hypoxia (H)      LOWELL (acute kidney injury) (H)      Cardiac arrest (H)      Dysphagia      HTN (hypertension)      Hypoxic ischemic encephalopathy      NSTEMI (non-ST elevated myocardial infarction) (H)        Past Social History     Reviewed, and he  reports that he has been smoking. He has never used smokeless tobacco. He reports current alcohol use. He reports that he does not use drugs.    Family History     Reviewed, and family history includes Diabetes in his maternal aunt; Lung cancer in his maternal grandfather; Other in his father.    Medication List   Post Discharge Medication Reconciliation Status: discharge medications reconciled, continue  medications without change   Current Outpatient Medications on File Prior to Visit   Medication Sig Dispense Refill     acetaminophen (TYLENOL) 650 mg/20.3 mL Soln 650 mg every 6 (six) hours as needed. Via PEG-tube        amantadine HCL (SYMMETREL) 50 mg/5 mL solution 100 mg every 12 (twelve) hours. Via PEG-tube       aspirin 81 MG EC tablet 81 mg daily. Via PEG-tube       atorvastatin (LIPITOR) 40 MG tablet 40 mg at bedtime. Via PEG-tube       bromocriptine (PARLODEL) 2.5 mg tablet 2.5 mg 2 (two) times a day. Via PEG-tube       chlorhexidine (PERIDEX) 0.12 % solution Apply 15 mL to the mouth or throat 4 (four) times a day.       furosemide (LASIX) 10 mg/mL solution 10 mg every other day. Via PEG-tube, hold if SBP <90       levETIRAcetam (KEPPRA) 100 mg/mL solution 1,000 mg 2 (two) times a day. Via PEG-tube       lisinopriL (PRINIVIL,ZESTRIL) 2.5 MG tablet 1.25 mg daily. Via PEG-tube, hold if SBP <90       LORazepam (ATIVAN) 0.5 MG tablet 1 tablet (0.5 mg total) by G-tube route 4 (four) times a day. 120 tablet 0     metoclopramide (REGLAN) 5 MG tablet 5 mg 2 (two) times a day. Via PEG-tube       metoprolol tartrate (LOPRESSOR) 25 MG tablet 6.25 mg 2 (two) times a day. Via PEG-tube, hold if SBP <90, HR <60       neomycin-bacitracin-polymyxin B (NEOSPORIN) 3.5-400-5,000 mg-unit-unit OiPk ointment Apply 1 application topically 2 (two) times a day.       omeprazole (PRILOSEC) 2 mg/mL SusR suspension 40 mg daily before breakfast. Via PEG-tube       pantoprazole (PROTONIX) 40 MG tablet 40 mg daily. Via PEG-tube       potassium bicarb-citric acid 20 mEq TbEF 20 mEq daily. Via PEG-tube       QUEtiapine (SEROQUEL) 25 MG tablet 12.5 mg 3 (three) times a day. Via PEG-tube       ticagrelor (BRILINTA) 90 mg Tab 90 mg 2 (two) times a day. Via PEG-tube       traZODone (DESYREL) 50 MG tablet 50 mg at bedtime. Via PEG-tube       white petrolatum (AQUAPHOR ORIGINAL) 41 % Oint Apply 1 application topically 2 (two) times a day. Apply  to feet/legs       No current facility-administered medications on file prior to visit.        Allergies     No Known Allergies    Review of Systems   A comprehensive review of 14 systems was done. Pertinent findings noted here and in history of present illness. All the rest negative.  Constitutional: Negative.  Negative for fever, chills, he has activity change, appetite change and fatigue.   He has now been walking and taking a few steps.  He has been talking also  HENT: Negative for congestion and facial swelling.    Eyes: Negative for photophobia, redness and visual disturbance.   Respiratory: Negative for cough and chest tightness.    Cardiovascular: Negative for chest pain, palpitations and leg swelling.   Gastrointestinal: Negative for nausea, diarrhea, constipation, blood in stool and abdominal distention.   Genitourinary: Negative.    Musculoskeletal: Negative.  Hard to assess he does move his legs and arms spontaneously but not to command  Skin: Negative.    Neurological: Negative for dizziness, tremors, syncope, weakness, light-headedness and headaches.   Constant scissoring like movement of his legs and arms noted.  Significant excoriation of the right hip area noted  Pulling his tube feeding out  Nonresponsive  Hematological: Does not bruise/bleed easily.   Psychiatric/Behavioral: Negative.  Unable to assess as patient does not cooperate  However today noted to be talking spontaneously  Staff reporting that he is not sleeping well.  He has significant anxiety and frequently rolls out of bed      Physical Exam     Recent Vitals 9/3/2020   Weight 164 lbs 13 oz   /69   Pulse 91   Temp 98.9   Some recent data might be hidden   Weight is 168 pounds blood pressure 80/46 temp 99  pulse 105    Constitutional:  appears well-developed.   HEENT:  Normocephalic and atraumatic.  Eyes: Conjunctivae and EOM are normal. Pupils are equal, round, and reactive to light. No discharge.  No scleral icterus. Nose  normal. Mouth/Throat: Oropharynx is clear and moist. No oropharyngeal exudate.    NECK: Normal range of motion. Neck supple. No JVD present. No tracheal deviation present. No thyromegaly present.   CARDIOVASCULAR: Normal rate, regular rhythm and intact distal pulses.  Exam reveals no gallop and no friction rub.  Systolic murmur present.  PULMONARY: Effort normal and breath sounds normal. No respiratory distress.No Wheezing or rales.  ABDOMEN: Soft. Bowel sounds are normal. No distension and no mass.  There is no tenderness. There is no rebound and no guarding. No HSM.  Does have a G-tube which is somewhat sore around the insertion site  MUSCULOSKELETAL: Normal range of motion. No edema and no tenderness. Mild kyphosis, no tenderness.  Hard to assess as patient does not respond to verbal commands or follow them.  He seems to be spontaneously moving his arms and legs though  LYMPH NODES: Has no cervical, supraclavicular, axillary and groin adenopathy.   NEUROLOGICAL: Alert and oriented to NONE. No cranial nerve deficit.  Normal muscle tone. Coordination normal.   He does not make any eye contact.  Does not follow verbal commands either  GENITOURINARY: Deferred exam.  SKIN: Skin is warm and dry. No rash noted. No erythema. No pallor.   Excoriation of the skin in the abdomen noted  EXTREMITIES: No cyanosis, no clubbing, no edema. No Deformity.  PSYCHIATRIC: mood, affect and behavior is hard to assess because of noncommunication.      Lab Results     Hemoglobin 10.6.  White count was 11.5 with platelets of 336.  Electrolytes were normal.  Calcium 9.7 magnesium 2.2.  CR 0.76  4 days 119 AST 27 ALT 45      Imaging Results     Echocardiogram on 6/10/2020 shows LV ejection fraction of 36%.  LAD territory akinesis with right ventricular function chamber size wall movement and thickness are normal.    Coronary angiogram shows 100% stenosis of the proximal LAD lesion    CT of the head was negative    DARIO Vargas

## 2021-06-11 NOTE — PROGRESS NOTES
[facility]      NAME:  Scot Bishop             :  1980    MRN: [id number]    CODE STATUS:      FACILITY: [facility] [[id number]]       CHIEF COMPLAIN/REASON FOR VISIT:  Chief Complaint   Patient presents with     Review Of Multiple Medical Conditions     SKIN ISSUES       HISTORY OF PRESENT ILLNESS: Scot Bishop is a 40 y.o. male being seen per nursing request for yeast like rash to buttocks. Ths is noted to be improving with use of nystatin ointment. . Started on abx this week for impetigo.  He has no known medical past as per his EMR from [facility] prior to a NStemi in May 2020. At that juncture he was in cardiac cath lab and suffered a hypoxic brain injury, high temp and thrombotic event. He is seen in his room today. Non verbal and did not track with his eyes. He will make eye contact. He has repetitive movement while in bed.He has repetitive laughing today. He varies between crying and laughing per nursing.  Moving legs and arms. Per nursing he  Slid from bed over week end, no apparent injuries, they are placing a scoop mattress for preventive falls. . He will need ongoing rehab services with PT/OT/ST. Has Gtube de to his dysphagia. Unfortunealy due to Scot Hypoxic brain injury he will not be able to understand hygiene education and nursing will need to meet these needs for pt. Therapy continues to work with him, seen ambualting with SBA two.Staff to anticipate needs and provide good elizabeth care two times a day and prn    No Known Allergies:     Current Outpatient Medications   Medication Sig     acetaminophen (TYLENOL) 650 mg/20.3 mL Soln 650 mg every 6 (six) hours as needed. Via PEG-tube      amantadine HCL (SYMMETREL) 50 mg/5 mL solution 100 mg every 12 (twelve) hours. Via PEG-tube     aspirin 81 MG EC tablet 81 mg daily. Via PEG-tube     atorvastatin (LIPITOR) 40 MG tablet 40 mg at bedtime. Via PEG-tube     bromocriptine (PARLODEL) 2.5 mg tablet 2.5 mg  2 (two) times a day. Via PEG-tube     chlorhexidine (PERIDEX) 0.12 % solution Apply 15 mL to the mouth or throat 4 (four) times a day.     furosemide (LASIX) 10 mg/mL solution 10 mg every other day. Via PEG-tube, hold if SBP <90     levETIRAcetam (KEPPRA) 100 mg/mL solution 1,000 mg 2 (two) times a day. Via PEG-tube     lisinopriL (PRINIVIL,ZESTRIL) 2.5 MG tablet 1.25 mg daily. Via PEG-tube, hold if SBP <90     LORazepam (ATIVAN) 0.5 MG tablet 1 tablet (0.5 mg total) by G-tube route 4 (four) times a day.     metoclopramide (REGLAN) 5 MG tablet 5 mg 2 (two) times a day. Via PEG-tube     metoprolol tartrate (LOPRESSOR) 25 MG tablet 6.25 mg 2 (two) times a day. Via PEG-tube, hold if SBP <90, HR <60     neomycin-bacitracin-polymyxin B (NEOSPORIN) 3.5-400-5,000 mg-unit-unit OiPk ointment Apply 1 application topically 2 (two) times a day.     omeprazole (PRILOSEC) 2 mg/mL SusR suspension 40 mg daily before breakfast. Via PEG-tube     pantoprazole (PROTONIX) 40 MG tablet 40 mg daily. Via PEG-tube     potassium bicarb-citric acid 20 mEq TbEF 20 mEq daily. Via PEG-tube     QUEtiapine (SEROQUEL) 25 MG tablet 12.5 mg 3 (three) times a day. Via PEG-tube     ticagrelor (BRILINTA) 90 mg Tab 90 mg 2 (two) times a day. Via PEG-tube     traZODone (DESYREL) 50 MG tablet 50 mg at bedtime. Via PEG-tube     white petrolatum (AQUAPHOR ORIGINAL) 41 % Oint Apply 1 application topically 2 (two) times a day. Apply to feet/legs         REVIEW OF SYSTEMS:  Unable to verbalize due to aphasia    PHYSICAL EXAMINATION:  Vitals:    09/03/20 1449   BP: 101/69   Pulse: 91   Temp: 98.9  F (37.2  C)   Weight: 164 lb 12.8 oz (74.8 kg)         GENERAL: Does not follow simple commands, makes eye contact, non verbal.  HEENT: Head is normocephalic with normal hair distribution. No evidence of trauma. Ears: No acute purulent discharge. Eyes: Conjunctivae pink with no scleral jaundice. Nose: Normal mucosa and septum. NECK: Supple with no cervical or  supraclavicular lymphadenopathy. Trachea is midline, healing trach stoma  EXTREMITIES: Atraumatic. Full range of motion on both upper and lower extremities, there is no tenderness to palpation, no pedal edema, no cyanosis or clubbing, no calf tenderness, normal cap refill, no joint swelling.  SKIN: Warm and dry, no erythema noted, abdomen with raw scratched irritated areas and pus like dried scabs, less erythema, improving.  NEUROLOGICAL: The patient is oriented to person, TBI      LABS:    Lab Results   Component Value Date    WBC 8.3 08/06/2020    HGB 11.0 (L) 08/06/2020    HCT 34.9 (L) 08/06/2020    MCV 90 08/06/2020     08/06/2020       Results for orders placed or performed in visit on 08/18/20   Basic Metabolic Panel   Result Value Ref Range    Sodium 141 136 - 145 mmol/L    Potassium 4.0 3.5 - 5.0 mmol/L    Chloride 104 98 - 107 mmol/L    CO2 25 22 - 31 mmol/L    Anion Gap, Calculation 12 5 - 18 mmol/L    Glucose 90 70 - 125 mg/dL    Calcium 9.5 8.5 - 10.5 mg/dL    BUN 12 8 - 22 mg/dL    Creatinine 0.81 0.70 - 1.30 mg/dL    GFR MDRD Af Amer >60 >60 mL/min/1.73m2    GFR MDRD Non Af Amer >60 >60 mL/min/1.73m2           No results found for: HGBA1C  No results found for: [id number]  No results found for: [id number]    ASSESSMENT/PLAN:  1. Hypoxic brain damage (H)    2. G tube feedings (H)    3. Yeast infection      1.Hypoxic brain injury: Continues to work with therapies, looking at increased therapy dc to [facility], a referral was sent awaiting acceptance.    2 .GtubeL Wt stable at 164.8 lb, occasional loose stools, is incont of both B and B.Now with bolus feedings vs continuous.    3.Yeast infection of  Buttocks: Add nystatin ointment on buttocks along with barrier cream two times a day., improvement noted, continue good elizabeth care and off loading     Electronically signed by:  Fouzai High CNP  This progress note was completed using Dragon software and there may be grammatical errors.

## 2021-06-11 NOTE — PROGRESS NOTES
[facility]      NAME:  Scot Bishop             :  1980    MRN: [id number]    CODE STATUS:      FACILITY: [facility] [[id number]]       CHIEF COMPLAIN/REASON FOR VISIT:  Chief Complaint   Patient presents with     Review Of Multiple Medical Conditions     dysphagia       HISTORY OF PRESENT ILLNESS: Scot Bishop is a 40 y.o. male being seen for per ST request due to his dysphagia. She would like to see if we could get a BS swallow eval.   [facility] to a [facility] in May 2020. At that juncture he was in cardiac cath lab and suffered a hypoxic brain injury, high temp and thrombotic event. He is seen in his room today. Non verbal and did not track with his eyes. He will make eye contact. He has repetitive movement while in bed.He has repetitive laughing today. He varies between crying and laughing per nursing.  Moving legs and arms. Per nursing he  Slid from bed over week end, no apparent injuries, they are placing a scoop mattress for preventive falls. . He will need ongoing rehab services with PT/OT/ST. Has Gtube due to his dysphagia, which reviewed that weight has been stable.. Unfortunealy due to Scot Hypoxic brain injury he will not be able to understand hygiene education and nursing will need to meet these needs for pt. Therapy continues to work with him, seen ambualting with SBA two.Staff to anticipate needs and provide good elizabeth care two times a day and prn.   continues to work with family towards DC to a group home after TCU stay as patient will be unable to care for self, remains dependant on staff for all ADL and mobility as well as GTube for feedings.. Pt does continue with rehab services    No Known Allergies:     Current Outpatient Medications   Medication Sig     acetaminophen (TYLENOL) 650 mg/20.3 mL Soln 650 mg every 6 (six) hours as needed. Via PEG-tube      amantadine HCL (SYMMETREL) 50 mg/5 mL solution 100 mg  every 12 (twelve) hours. Via PEG-tube     aspirin 81 MG EC tablet 81 mg daily. Via PEG-tube     atorvastatin (LIPITOR) 40 MG tablet 40 mg at bedtime. Via PEG-tube     bromocriptine (PARLODEL) 2.5 mg tablet 2.5 mg 2 (two) times a day. Via PEG-tube     chlorhexidine (PERIDEX) 0.12 % solution Apply 15 mL to the mouth or throat 4 (four) times a day.     levETIRAcetam (KEPPRA) 100 mg/mL solution 1,000 mg 2 (two) times a day. Via PEG-tube     lisinopriL (PRINIVIL,ZESTRIL) 2.5 MG tablet 1.25 mg daily. Via PEG-tube, hold if SBP <90     LORazepam (ATIVAN) 0.5 MG tablet 1 tablet (0.5 mg total) by G-tube route 4 (four) times a day.     metoclopramide (REGLAN) 5 MG tablet 5 mg 2 (two) times a day. Via PEG-tube     metoprolol tartrate (LOPRESSOR) 25 MG tablet 6.25 mg 2 (two) times a day. Via PEG-tube, hold if SBP <90, HR <60     neomycin-bacitracin-polymyxin B (NEOSPORIN) 3.5-400-5,000 mg-unit-unit OiPk ointment Apply 1 application topically 2 (two) times a day.     omeprazole (PRILOSEC) 2 mg/mL SusR suspension 40 mg daily before breakfast. Via PEG-tube     pantoprazole (PROTONIX) 40 MG tablet 40 mg daily. Via PEG-tube     potassium bicarb-citric acid 20 mEq TbEF 20 mEq daily. Via PEG-tube     QUEtiapine (SEROQUEL) 25 MG tablet 12.5 mg 3 (three) times a day. Via PEG-tube     ticagrelor (BRILINTA) 90 mg Tab 90 mg 2 (two) times a day. Via PEG-tube     traZODone (DESYREL) 50 MG tablet 50 mg at bedtime. Via PEG-tube     white petrolatum (AQUAPHOR ORIGINAL) 41 % Oint Apply 1 application topically 2 (two) times a day. Apply to feet/legs         REVIEW OF SYSTEMS:  Unable to verbalize due to aphasia    PHYSICAL EXAMINATION:  Vitals:    09/27/20 1901   BP: 98/64   Pulse: 80   Temp: 98.2  F (36.8  C)   Weight: 167 lb (75.8 kg)         GENERAL: Does not follow simple commands, makes eye contact, non verbal.  HEENT: Head is normocephalic with normal hair distribution. No evidence of trauma. Ears: No acute purulent discharge. Eyes:  Conjunctivae pink with no scleral jaundice. Nose: Normal mucosa and septum. NECK: Supple with no cervical or supraclavicular lymphadenopathy. Trachea is midline, healing trach stoma  EXTREMITIES: Atraumatic. Full range of motion on both upper and lower extremities, there is no tenderness to palpation, no pedal edema, no cyanosis or clubbing, no calf tenderness, normal cap refill, no joint swelling.  SKIN: Warm and dry, no erythema noted, abdomen with g tube  NEUROLOGICAL: The patient is oriented to person, TBI      LABS:    Lab Results   Component Value Date    WBC 8.3 08/06/2020    HGB 11.0 (L) 08/06/2020    HCT 34.9 (L) 08/06/2020    MCV 90 08/06/2020     08/06/2020       Results for orders placed or performed in visit on 09/17/20   Basic Metabolic Panel   Result Value Ref Range    Sodium 141 136 - 145 mmol/L    Potassium 4.5 3.5 - 5.0 mmol/L    Chloride 105 98 - 107 mmol/L    CO2 28 22 - 31 mmol/L    Anion Gap, Calculation 8 5 - 18 mmol/L    Glucose 91 70 - 125 mg/dL    Calcium 9.8 8.5 - 10.5 mg/dL    BUN 11 8 - 22 mg/dL    Creatinine 0.78 0.70 - 1.30 mg/dL    GFR MDRD Af Amer >60 >60 mL/min/1.73m2    GFR MDRD Non Af Amer >60 >60 mL/min/1.73m2           No results found for: HGBA1C  No results found for: [id number]  No results found for: [id number]    ASSESSMENT/PLAN:  1. Hypoxic brain damage (H)    2. Dysphagia, unspecified type      1..Hypoxic brain injury:   We currently continues with rehab at this juncture wheelchair-bound he will fall risk due to restlessness in bed and has a scoop mattress. Nursing staff does walk him with gait belt and guidance. His ambulating has an odd goose step when walking.  Met with our therapist today, and we discussed that [facility], rehab center in [place] did get back to them and have asked her all of September notes to review Scot could be a candidate for their TBI area, no bed available at this time. Facility will work on SMERT and look at LTC placement for  now.    2. Dysphagia: Spoke to Lucy JONES today and we did review an upcoming BS study. Scot may be able to advance to oral intake but its prudent we do  A study first. He will need minor sedation first as he may get agitated. We will fu closer to time when we schedule it.     Electronically signed by:  Fouzia High CNP  This progress note was completed using Dragon software and there may be grammatical errors.

## 2021-06-12 NOTE — PROGRESS NOTES
[facility]      NAME:  Scot Bishop             :  1980    MRN: [id number]    CODE STATUS:  Full    FACILITY: [facility] [[id number]]       CHIEF COMPLAIN/REASON FOR VISIT:  Chief Complaint   Patient presents with     Review Of Multiple Medical Conditions     brain injury       HISTORY OF PRESENT ILLNESS: Scot Bishop is a 40 y.o. male being seen for review of multiple medical conditions . He did poorly on his recent swallow study at  due to his brain injury, agitation and short attention span.. ST has started supervised soft food feedings.   [facility] to [facility] in May 2020. At that juncture he was in cardiac cath lab and suffered a hypoxic brain injury, high temp and thrombotic event. He is seen in his room today. Non verbal and did not track with his eyes. He will make eye contact. He has repetitive movement while in bed.He has repetitive laughing today. He varies between crying and laughing per nursing.  Moving legs and arms. Per nursing he  Slid from bed over week end, no apparent injuries, they are placing a scoop mattress for preventive falls. . He will need ongoing rehab services with PT/OT/ST. Has Gtube due to his dysphagia, which reviewed that weight has been stable.. Unfortunealy due to Scot Hypoxic brain injury he will not be able to understand hygiene education and nursing will need to meet these needs for pt. Therapy continues to work with him, seen ambualting with SBA two.Staff to anticipate needs and provide good elizabeth care two times a day and prn.   continues to work with family towards DC to a group home after TCU stay as patient will be unable to care for self, remains dependant on staff for all ADL and mobility as well as GTube for feedings.Wt stable but he needs frequent 1 to 1 when up out of bed.        . No Known Allergies:     Current Outpatient Medications   Medication Sig     acetaminophen  (TYLENOL) 650 mg/20.3 mL Soln 650 mg every 6 (six) hours as needed. Via PEG-tube      amantadine HCL (SYMMETREL) 50 mg/5 mL solution 100 mg every 12 (twelve) hours. Via PEG-tube     aspirin 81 MG EC tablet 81 mg daily. Via PEG-tube     atorvastatin (LIPITOR) 40 MG tablet 40 mg at bedtime. Via PEG-tube     bromocriptine (PARLODEL) 2.5 mg tablet 2.5 mg 2 (two) times a day. Via PEG-tube     chlorhexidine (PERIDEX) 0.12 % solution Apply 15 mL to the mouth or throat 4 (four) times a day.     levETIRAcetam (KEPPRA) 100 mg/mL solution 1,000 mg 2 (two) times a day. Via PEG-tube     lisinopriL (PRINIVIL,ZESTRIL) 2.5 MG tablet 1.25 mg daily. Via PEG-tube, hold if SBP <90     LORazepam (ATIVAN) 0.5 MG tablet 1 tablet (0.5 mg total) by G-tube route 4 (four) times a day.     metoclopramide (REGLAN) 5 MG tablet 5 mg 2 (two) times a day. Via PEG-tube     metoprolol tartrate (LOPRESSOR) 25 MG tablet 6.25 mg 2 (two) times a day. Via PEG-tube, hold if SBP <90, HR <60     neomycin-bacitracin-polymyxin B (NEOSPORIN) 3.5-400-5,000 mg-unit-unit OiPk ointment Apply 1 application topically 2 (two) times a day.     omeprazole (PRILOSEC) 2 mg/mL SusR suspension 40 mg daily before breakfast. Via PEG-tube     pantoprazole (PROTONIX) 40 MG tablet 40 mg daily. Via PEG-tube     potassium bicarb-citric acid 20 mEq TbEF 20 mEq daily. Via PEG-tube     QUEtiapine (SEROQUEL) 25 MG tablet 12.5 mg 3 (three) times a day. Via PEG-tube     ticagrelor (BRILINTA) 90 mg Tab 90 mg 2 (two) times a day. Via PEG-tube     traZODone (DESYREL) 50 MG tablet 50 mg at bedtime. Via PEG-tube     white petrolatum (AQUAPHOR ORIGINAL) 41 % Oint Apply 1 application topically 2 (two) times a day. Apply to feet/legs         REVIEW OF SYSTEMS:  Unable to verbalize due to aphasia    PHYSICAL EXAMINATION:  There were no vitals filed for this visit.      GENERAL: Does not follow simple commands, makes eye contact, non verbal.  HEENT: Head is normocephalic with normal hair  distribution. No evidence of trauma. Ears: No acute purulent discharge. Eyes: Conjunctivae pink with no scleral jaundice. Nose: Normal mucosa and septum. NECK: Supple with no cervical or supraclavicular lymphadenopathy. Trachea is midline, healing trach stoma  Lungs : He is CTA  EXTREMITIES: Atraumatic. Full range of motion on both upper and lower extremities, there is no tenderness to palpation, no pedal edema, no cyanosis or clubbing, no calf tenderness, normal cap refill, no joint swelling.  SKIN: Warm and dry, no erythema noted, abdomen with g tube  NEUROLOGICAL: The patient is oriented to person, TBI  Social Distancing with PPE practiced due to Covid 19    LABS:    Lab Results   Component Value Date    WBC 8.3 08/06/2020    HGB 11.0 (L) 08/06/2020    HCT 34.9 (L) 08/06/2020    MCV 90 08/06/2020     08/06/2020       Results for orders placed or performed in visit on 10/05/20   Basic Metabolic Panel   Result Value Ref Range    Sodium 141 136 - 145 mmol/L    Potassium 4.2 3.5 - 5.0 mmol/L    Chloride 104 98 - 107 mmol/L    CO2 26 22 - 31 mmol/L    Anion Gap, Calculation 11 5 - 18 mmol/L    Glucose 106 70 - 125 mg/dL    Calcium 9.7 8.5 - 10.5 mg/dL    BUN 13 8 - 22 mg/dL    Creatinine 0.83 0.70 - 1.30 mg/dL    GFR MDRD Af Amer >60 >60 mL/min/1.73m2    GFR MDRD Non Af Amer >60 >60 mL/min/1.73m2           No results found for: HGBA1C  No results found for: [id number]  No results found for: [id number]    ASSESSMENT/PLAN:  1. Dysphagia, unspecified type    2. Hypoxic brain injury (H)      1.. Dysphagia: Has  Started  soft food feedings. Remains on TFing eneteral for nutrition, bolus feedings , all meds via gtube. Nursing will need strict VS and Lung assessment QS and we will stop in the event he does not tolerate well.Lungs clear and no cough    2. Hypoxic brain injury: Continues with therapies, IDT to review update tomorrow. DC planning , will need LTC while awaiting brain injury group home.      Electronically signed by:  Fouzia High CNP  This progress note was completed using Dragon software and there may be grammatical errors.

## 2021-06-12 NOTE — PROGRESS NOTES
[facility]      NAME:  Scot Bishop             :  1980    MRN: [id number]    CODE STATUS:      FACILITY: [facility] [[id number]]       CHIEF COMPLAIN/REASON FOR VISIT:  Chief Complaint   Patient presents with     Problem Visit     dysphagia       HISTORY OF PRESENT ILLNESS: Scot Bishop is a 40 y.o. male being seen for per ST request due to his dysphagia swallow results. He did poorly due to his brain injury, agitation and short attention span. . She would like to see if we could get a BS swallow eval.   [place] in May 2020. At that juncture he was in cardiac cath lab and suffered a hypoxic brain injury, high temp and thrombotic event. He is seen in his room today. Non verbal and did not track with his eyes. He will make eye contact. He has repetitive movement while in bed.He has repetitive laughing today. He varies between crying and laughing per nursing.  Moving legs and arms. Per nursing he  Slid from bed over week end, no apparent injuries, they are placing a scoop mattress for preventive falls. . He will need ongoing rehab services with PT/OT/ST. Has Gtube due to his dysphagia, which reviewed that weight has been stable.. Unfortunealy due to Scot Hypoxic brain injury he will not be able to understand hygiene education and nursing will need to meet these needs for pt. Therapy continues to work with him, seen ambualting with SBA two.Staff to anticipate needs and provide good elizabeth care two times a day and prn.   continues to work with family towards DC to a group home after TCU stay as patient will be unable to care for self, remains dependant on staff for all ADL and mobility as well as GTube for feedings.Wt stable at 167. Pt does continue with rehab services. Reviewed with ST potential feeding program for Scot.    No Known Allergies:     Current Outpatient Medications   Medication Sig     acetaminophen  (TYLENOL) 650 mg/20.3 mL Soln 650 mg every 6 (six) hours as needed. Via PEG-tube      amantadine HCL (SYMMETREL) 50 mg/5 mL solution 100 mg every 12 (twelve) hours. Via PEG-tube     aspirin 81 MG EC tablet 81 mg daily. Via PEG-tube     atorvastatin (LIPITOR) 40 MG tablet 40 mg at bedtime. Via PEG-tube     bromocriptine (PARLODEL) 2.5 mg tablet 2.5 mg 2 (two) times a day. Via PEG-tube     chlorhexidine (PERIDEX) 0.12 % solution Apply 15 mL to the mouth or throat 4 (four) times a day.     levETIRAcetam (KEPPRA) 100 mg/mL solution 1,000 mg 2 (two) times a day. Via PEG-tube     lisinopriL (PRINIVIL,ZESTRIL) 2.5 MG tablet 1.25 mg daily. Via PEG-tube, hold if SBP <90     LORazepam (ATIVAN) 0.5 MG tablet 1 tablet (0.5 mg total) by G-tube route 4 (four) times a day.     metoclopramide (REGLAN) 5 MG tablet 5 mg 2 (two) times a day. Via PEG-tube     metoprolol tartrate (LOPRESSOR) 25 MG tablet 6.25 mg 2 (two) times a day. Via PEG-tube, hold if SBP <90, HR <60     neomycin-bacitracin-polymyxin B (NEOSPORIN) 3.5-400-5,000 mg-unit-unit OiPk ointment Apply 1 application topically 2 (two) times a day.     omeprazole (PRILOSEC) 2 mg/mL SusR suspension 40 mg daily before breakfast. Via PEG-tube     pantoprazole (PROTONIX) 40 MG tablet 40 mg daily. Via PEG-tube     potassium bicarb-citric acid 20 mEq TbEF 20 mEq daily. Via PEG-tube     QUEtiapine (SEROQUEL) 25 MG tablet 12.5 mg 3 (three) times a day. Via PEG-tube     ticagrelor (BRILINTA) 90 mg Tab 90 mg 2 (two) times a day. Via PEG-tube     traZODone (DESYREL) 50 MG tablet 50 mg at bedtime. Via PEG-tube     white petrolatum (AQUAPHOR ORIGINAL) 41 % Oint Apply 1 application topically 2 (two) times a day. Apply to feet/legs         REVIEW OF SYSTEMS:  Unable to verbalize due to aphasia    PHYSICAL EXAMINATION:  Vitals:    10/03/20 0848   BP: 103/66   Pulse: 99   Temp: 98.1  F (36.7  C)   Weight: 167 lb (75.8 kg)         GENERAL: Does not follow simple commands, makes eye contact, non  verbal.  HEENT: Head is normocephalic with normal hair distribution. No evidence of trauma. Ears: No acute purulent discharge. Eyes: Conjunctivae pink with no scleral jaundice. Nose: Normal mucosa and septum. NECK: Supple with no cervical or supraclavicular lymphadenopathy. Trachea is midline, healing trach stoma  EXTREMITIES: Atraumatic. Full range of motion on both upper and lower extremities, there is no tenderness to palpation, no pedal edema, no cyanosis or clubbing, no calf tenderness, normal cap refill, no joint swelling.  SKIN: Warm and dry, no erythema noted, abdomen with g tube  NEUROLOGICAL: The patient is oriented to person, TBI  Social Distancing with PPE practiced due to Covid 19    LABS:    Lab Results   Component Value Date    WBC 8.3 08/06/2020    HGB 11.0 (L) 08/06/2020    HCT 34.9 (L) 08/06/2020    MCV 90 08/06/2020     08/06/2020       Results for orders placed or performed in visit on 09/17/20   Basic Metabolic Panel   Result Value Ref Range    Sodium 141 136 - 145 mmol/L    Potassium 4.5 3.5 - 5.0 mmol/L    Chloride 105 98 - 107 mmol/L    CO2 28 22 - 31 mmol/L    Anion Gap, Calculation 8 5 - 18 mmol/L    Glucose 91 70 - 125 mg/dL    Calcium 9.8 8.5 - 10.5 mg/dL    BUN 11 8 - 22 mg/dL    Creatinine 0.78 0.70 - 1.30 mg/dL    GFR MDRD Af Amer >60 >60 mL/min/1.73m2    GFR MDRD Non Af Amer >60 >60 mL/min/1.73m2           No results found for: HGBA1C  No results found for: [id number]  No results found for: [id number]    ASSESSMENT/PLAN:  1. Dysphagia, unspecified type    2. G tube feedings (H)      1.. Dysphagia: Spoke to Lucy JONES today and we did review results of his BS swallow. He did not pass study. At this juncture hs attention span and distraction/agitation is self limiting. We will have ST with family guidance of risk benifets review a soft food feeding protocol with ST only.  Nursing will need strict VS and Lung assessment QS and we will stopp in the event he does not tolerate  well.    2. TF: Followed by dietary and ST, Nursing manages TFing as ordered. Wt stable at 167.     Electronically signed by:  Fouzia High CNP  This progress note was completed using Dragon software and there may be grammatical errors.

## 2021-06-12 NOTE — PROGRESS NOTES
[facility]      NAME:  Scot Bishop             :  1980    MRN: [id number]    CODE STATUS:  Full    FACILITY: [facility] [[id number]]       CHIEF COMPLAIN/REASON FOR VISIT:  Chief Complaint   Patient presents with     Problem Visit     med review       HISTORY OF PRESENT ILLNESS: Scot Bishop is a 40 y.o. male being seen for review of multiple medical conditions .Nursing request med review. Still continues with prn seroquel and scheduled. We will discontinue the prn dose. Also on k+  so we will have a BMP drawn.ST has started supervised soft food feedings, and they will increase food textures. Nursing reports ongoing agitation, and resistive with cares due to his brain injury. .   [place] in May 2020. At that juncture he was in cardiac cath lab and suffered a hypoxic brain injury, high temp and thrombotic event. He is seen in his room today. Non verbal and did not track with his eyes. He will make eye contact. He has repetitive movement while in bed.He has repetitive laughing today. He varies between crying and laughing per nursing.  Moving legs and arms. Per nursing he  Slid from bed over week end, no apparent injuries, they are placing a scoop mattress for preventive falls. . He will need ongoing rehab services with PT/OT/ST. Has Gtube due to his dysphagia, which reviewed that weight has been stable.. Unfortunealy due to Scot Hypoxic brain injury he will not be able to understand hygiene education and nursing will need to meet these needs for pt. Therapy continues to work with him, seen ambualting with SBA two.Staff to anticipate needs and provide good elizabeth care two times a day and prn.   continues to work with family towards DC to a group home after TCU stay as patient will be unable to care for self, remains dependant on staff for all ADL and mobility as well as GTube for feedings.Wt stable but he needs  frequent 1 to 1 when up out of bed .  Nursing reports ongoing agitation, prn.      . No Known Allergies:     Current Outpatient Medications   Medication Sig     acetaminophen (TYLENOL) 650 mg/20.3 mL Soln 650 mg every 6 (six) hours as needed. Via PEG-tube      amantadine HCL (SYMMETREL) 50 mg/5 mL solution 100 mg every 12 (twelve) hours. Via PEG-tube     aspirin 81 MG EC tablet 81 mg daily. Via PEG-tube     atorvastatin (LIPITOR) 40 MG tablet 40 mg at bedtime. Via PEG-tube     bromocriptine (PARLODEL) 2.5 mg tablet 2.5 mg 2 (two) times a day. Via PEG-tube     chlorhexidine (PERIDEX) 0.12 % solution Apply 15 mL to the mouth or throat 4 (four) times a day.     levETIRAcetam (KEPPRA) 100 mg/mL solution 1,000 mg 2 (two) times a day. Via PEG-tube     lisinopriL (PRINIVIL,ZESTRIL) 2.5 MG tablet 1.25 mg daily. Via PEG-tube, hold if SBP <90     LORazepam (ATIVAN) 0.5 MG tablet 1 tablet (0.5 mg total) by G-tube route 4 (four) times a day for 3 days.     metoclopramide (REGLAN) 5 MG tablet 5 mg 2 (two) times a day. Via PEG-tube     metoprolol tartrate (LOPRESSOR) 25 MG tablet 6.25 mg 2 (two) times a day. Via PEG-tube, hold if SBP <90, HR <60     neomycin-bacitracin-polymyxin B (NEOSPORIN) 3.5-400-5,000 mg-unit-unit OiPk ointment Apply 1 application topically 2 (two) times a day.     omeprazole (PRILOSEC) 2 mg/mL SusR suspension 40 mg daily before breakfast. Via PEG-tube     pantoprazole (PROTONIX) 40 MG tablet 40 mg daily. Via PEG-tube     potassium bicarb-citric acid 20 mEq TbEF 20 mEq daily. Via PEG-tube     QUEtiapine (SEROQUEL) 25 MG tablet 25 mg 2 (two) times a day. Via PEG-tube give 25 mg am/pm with 12.5 mg mid day due to increased agitation     ticagrelor (BRILINTA) 90 mg Tab 90 mg 2 (two) times a day. Via PEG-tube     traZODone (DESYREL) 50 MG tablet 50 mg at bedtime. Via PEG-tube     white petrolatum (AQUAPHOR ORIGINAL) 41 % Oint Apply 1 application topically 2 (two) times a day. Apply to feet/legs         REVIEW OF  SYSTEMS:  Unable to verbalize due to aphasia    PHYSICAL EXAMINATION:  Vitals:    10/26/20 1450   BP: 98/60   Pulse: 98   Temp: 98.5  F (36.9  C)   Weight: 166 lb (75.3 kg)         GENERAL: Does not follow simple commands, makes eye contact, non verbal.  HEENT: Head is normocephalic with normal hair distribution. No evidence of trauma. Ears: No acute purulent discharge. Eyes: Conjunctivae pink with no scleral jaundice. Nose: Normal mucosa and septum. NECK: Supple with no cervical or supraclavicular lymphadenopathy. Trachea is midline, healing trach stoma  Lungs : He is CTA  EXTREMITIES: Atraumatic. Full range of motion on both upper and lower extremities, there is no tenderness to palpation, no pedal edema, no cyanosis or clubbing, no calf tenderness, normal cap refill, no joint swelling.  SKIN: Warm and dry, no erythema noted, abdomen with g tube  NEUROLOGICAL: The patient is oriented to person, TBI  Social Distancing with PPE practiced due to Covid 19    LABS:    Lab Results   Component Value Date    WBC 8.3 08/06/2020    HGB 11.0 (L) 08/06/2020    HCT 34.9 (L) 08/06/2020    MCV 90 08/06/2020     08/06/2020       Results for orders placed or performed in visit on 10/05/20   Basic Metabolic Panel   Result Value Ref Range    Sodium 141 136 - 145 mmol/L    Potassium 4.2 3.5 - 5.0 mmol/L    Chloride 104 98 - 107 mmol/L    CO2 26 22 - 31 mmol/L    Anion Gap, Calculation 11 5 - 18 mmol/L    Glucose 106 70 - 125 mg/dL    Calcium 9.7 8.5 - 10.5 mg/dL    BUN 13 8 - 22 mg/dL    Creatinine 0.83 0.70 - 1.30 mg/dL    GFR MDRD Af Amer >60 >60 mL/min/1.73m2    GFR MDRD Non Af Amer >60 >60 mL/min/1.73m2           No results found for: HGBA1C  No results found for: [id number]  No results found for: GHPJDTJR03    ASSESSMENT/PLAN:  1. G tube feedings (H)    2. Hypoxic brain damage (H)      1.. Dysphagia/Gtube : Has  Started  soft food feedings. Remains on TFing eneteral for nutrition, bolus feedings , all meds via gtube.  Nursing will need strict VS and Lung assessment QS and we will stop in the event he does not tolerate well.Lungs clear and no cough.  He continues to do well. ST reports will be starting new texture foods this week,he has been tolerating oral feedings with enterals.He remains on Potassium, we will check his labs for BMP today.    2. Hypoxic Brain injury: Scot has plateau at current level. He has scheduled  Seroquel due to agitation. discontinue prn Seroquel today. Has aggressive behaviors requiring 1 to 1 services at times.     Electronically signed by:  Fouzia High CNP  This progress note was completed using Dragon software and there may be grammatical errors.

## 2021-06-12 NOTE — PROGRESS NOTES
[facility]      NAME:  Scot Bishop             :  1980    MRN: [id number]    CODE STATUS:  Full    FACILITY: [facility] [[id number]]       CHIEF COMPLAIN/REASON FOR VISIT:  Chief Complaint   Patient presents with     Review Of Multiple Medical Conditions     agitation       HISTORY OF PRESENT ILLNESS: Scot Bishop is a 40 y.o. male being seen for review of multiple medical conditions .. ST has started supervised soft food feedings, and they will increase food textures. Nursing reports ongoing agitation, and resistive with cares due to his brain injury. .   [place] in May 2020. At that juncture he was in cardiac cath lab and suffered a hypoxic brain injury, high temp and thrombotic event. He is seen in his room today. Non verbal and did not track with his eyes. He will make eye contact. He has repetitive movement while in bed.He has repetitive laughing today. He varies between crying and laughing per nursing.  Moving legs and arms. Per nursing he  Slid from bed over week end, no apparent injuries, they are placing a scoop mattress for preventive falls. . He will need ongoing rehab services with PT/OT/ST. Has Gtube due to his dysphagia, which reviewed that weight has been stable.. Unfortunealy due to Scot Hypoxic brain injury he will not be able to understand hygiene education and nursing will need to meet these needs for pt. Therapy continues to work with him, seen ambualting with SBA two.Staff to anticipate needs and provide good elizabeth care two times a day and prn.   continues to work with family towards DC to a group home after TCU stay as patient will be unable to care for self, remains dependant on staff for all ADL and mobility as well as GTube for feedings.Wt stable but he needs frequent 1 to 1 when up out of bed.        . No Known Allergies:     Current Outpatient Medications   Medication Sig      acetaminophen (TYLENOL) 650 mg/20.3 mL Soln 650 mg every 6 (six) hours as needed. Via PEG-tube      amantadine HCL (SYMMETREL) 50 mg/5 mL solution 100 mg every 12 (twelve) hours. Via PEG-tube     aspirin 81 MG EC tablet 81 mg daily. Via PEG-tube     atorvastatin (LIPITOR) 40 MG tablet 40 mg at bedtime. Via PEG-tube     bromocriptine (PARLODEL) 2.5 mg tablet 2.5 mg 2 (two) times a day. Via PEG-tube     chlorhexidine (PERIDEX) 0.12 % solution Apply 15 mL to the mouth or throat 4 (four) times a day.     levETIRAcetam (KEPPRA) 100 mg/mL solution 1,000 mg 2 (two) times a day. Via PEG-tube     lisinopriL (PRINIVIL,ZESTRIL) 2.5 MG tablet 1.25 mg daily. Via PEG-tube, hold if SBP <90     LORazepam (ATIVAN) 0.5 MG tablet 1 tablet (0.5 mg total) by G-tube route 4 (four) times a day.     metoclopramide (REGLAN) 5 MG tablet 5 mg 2 (two) times a day. Via PEG-tube     metoprolol tartrate (LOPRESSOR) 25 MG tablet 6.25 mg 2 (two) times a day. Via PEG-tube, hold if SBP <90, HR <60     neomycin-bacitracin-polymyxin B (NEOSPORIN) 3.5-400-5,000 mg-unit-unit OiPk ointment Apply 1 application topically 2 (two) times a day.     omeprazole (PRILOSEC) 2 mg/mL SusR suspension 40 mg daily before breakfast. Via PEG-tube     pantoprazole (PROTONIX) 40 MG tablet 40 mg daily. Via PEG-tube     potassium bicarb-citric acid 20 mEq TbEF 20 mEq daily. Via PEG-tube     QUEtiapine (SEROQUEL) 25 MG tablet 25 mg 2 (two) times a day. Via PEG-tube give 25 mg am/pm with 12.5 mg mid day due to increased agitation     ticagrelor (BRILINTA) 90 mg Tab 90 mg 2 (two) times a day. Via PEG-tube     traZODone (DESYREL) 50 MG tablet 50 mg at bedtime. Via PEG-tube     white petrolatum (AQUAPHOR ORIGINAL) 41 % Oint Apply 1 application topically 2 (two) times a day. Apply to feet/legs         REVIEW OF SYSTEMS:  Unable to verbalize due to aphasia    PHYSICAL EXAMINATION:  Vitals:    10/14/20 2043   BP: 100/67   Pulse: 85   Temp: 97  F (36.1  C)   Weight: 166 lb (75.3 kg)          GENERAL: Does not follow simple commands, makes eye contact, non verbal.  HEENT: Head is normocephalic with normal hair distribution. No evidence of trauma. Ears: No acute purulent discharge. Eyes: Conjunctivae pink with no scleral jaundice. Nose: Normal mucosa and septum. NECK: Supple with no cervical or supraclavicular lymphadenopathy. Trachea is midline, healing trach stoma  Lungs : He is CTA  EXTREMITIES: Atraumatic. Full range of motion on both upper and lower extremities, there is no tenderness to palpation, no pedal edema, no cyanosis or clubbing, no calf tenderness, normal cap refill, no joint swelling.  SKIN: Warm and dry, no erythema noted, abdomen with g tube  NEUROLOGICAL: The patient is oriented to person, TBI  Social Distancing with PPE practiced due to Covid 19    LABS:    Lab Results   Component Value Date    WBC 8.3 08/06/2020    HGB 11.0 (L) 08/06/2020    HCT 34.9 (L) 08/06/2020    MCV 90 08/06/2020     08/06/2020       Results for orders placed or performed in visit on 10/05/20   Basic Metabolic Panel   Result Value Ref Range    Sodium 141 136 - 145 mmol/L    Potassium 4.2 3.5 - 5.0 mmol/L    Chloride 104 98 - 107 mmol/L    CO2 26 22 - 31 mmol/L    Anion Gap, Calculation 11 5 - 18 mmol/L    Glucose 106 70 - 125 mg/dL    Calcium 9.7 8.5 - 10.5 mg/dL    BUN 13 8 - 22 mg/dL    Creatinine 0.83 0.70 - 1.30 mg/dL    GFR MDRD Af Amer >60 >60 mL/min/1.73m2    GFR MDRD Non Af Amer >60 >60 mL/min/1.73m2           No results found for: HGBA1C  No results found for: [id number]  No results found for: [id number]    ASSESSMENT/PLAN:  1. Agitation    2. Dysphagia, unspecified type      1. Agitation: Scot continues to have agitation, striking out and resisting care. We will increase his three times a day serouel to 25 mg two times a day with 12.5 every day which is an overall increase of 25 mg daily. Staff basically 1-1 Scot when up due to fall risk. dicontinue planning ongoing.    2. Dysphagia:  Has  Started  soft food feedings. Remains on TFing eneteral for nutrition, bolus feedings , all meds via gtube. Nursing will need strict VS and Lung assessment QS and we will stop in the event he does not tolerate well.Lungs clear and no cough.  He continues to do well. ST reports will be starting new texture foods this week,     Electronically signed by:  Fouzia High CNP  This progress note was completed using Dragon software and there may be grammatical errors.

## 2021-06-12 NOTE — PROGRESS NOTES
[facility]      NAME:  Scot Bishop             :  1980    MRN: [id number]    CODE STATUS:  Full    FACILITY: [facility] [[id number]]       CHIEF COMPLAIN/REASON FOR VISIT:  Chief Complaint   Patient presents with     Problem Visit     increased agitation       HISTORY OF PRESENT ILLNESS: Scot Bishop is a 40 y.o. male being seen as reported that he had choking event over week end and went to ER.N He was returned to TCU, bUT SEEN THIS AM AND HE IS PAST AGITATED THIS AM, HE IS VERY AGGRESSIVE AND BITTING AND HITTING AT STAFF.  He is a TBI. ST has started supervised food feedings, and they will increase food textures. Nursing reports ongoing agitation, and resistive with cares due to his brain injury.  [place] in May 2020. At that juncture he was in cardiac cath lab and suffered a hypoxic brain injury, high temp and thrombotic event. He is seen in his room today. Non verbal and did not track with his eyes. He will make eye contact. He has repetitive movement while in bed.He has repetitive laughing today. He varies between crying and laughing per nursing.  Moving legs and arms. Per nursing he  Slid from bed over week end, no apparent injuries, they are placing a scoop mattress for preventive falls. . He will need ongoing rehab services with PT/OT/ST. Has Gtube due to his dysphagia, which reviewed that weight has been stable. DC to a group home after TCU stay as patient will be unable to care for self, remains dependant on staff for all ADL and mobility as well as GTube for feedings.Wt stable but he needs frequent 1 to 1 when up out of bed .  Nursing reports ongoing agitation,earlier I saw him and did increase Seroquel however at this juncture he is so aggressive her could harm self or others and will need to go to acute care for more through assessment and testing than can be provided on TCU    . No Known Allergies:      Current Outpatient Medications   Medication Sig     acetaminophen (TYLENOL) 650 mg/20.3 mL Soln 650 mg every 6 (six) hours as needed. Via PEG-tube      amantadine HCL (SYMMETREL) 50 mg/5 mL solution 100 mg every 12 (twelve) hours. Via PEG-tube     aspirin 81 MG EC tablet 81 mg daily. Via PEG-tube     atorvastatin (LIPITOR) 40 MG tablet 40 mg at bedtime. Via PEG-tube     bromocriptine (PARLODEL) 2.5 mg tablet 2.5 mg 2 (two) times a day. Via PEG-tube     chlorhexidine (PERIDEX) 0.12 % solution Apply 15 mL to the mouth or throat 4 (four) times a day.     levETIRAcetam (KEPPRA) 100 mg/mL solution 1,000 mg 2 (two) times a day. Via PEG-tube     lisinopriL (PRINIVIL,ZESTRIL) 2.5 MG tablet 1.25 mg daily. Via PEG-tube, hold if SBP <90     LORazepam (ATIVAN) 0.5 MG tablet 1 tablet (0.5 mg total) by G-tube route 4 (four) times a day for 3 days.     metoclopramide (REGLAN) 5 MG tablet 5 mg 2 (two) times a day. Via PEG-tube     metoprolol tartrate (LOPRESSOR) 25 MG tablet 6.25 mg 2 (two) times a day. Via PEG-tube, hold if SBP <90, HR <60     neomycin-bacitracin-polymyxin B (NEOSPORIN) 3.5-400-5,000 mg-unit-unit OiPk ointment Apply 1 application topically 2 (two) times a day.     omeprazole (PRILOSEC) 2 mg/mL SusR suspension 40 mg daily before breakfast. Via PEG-tube     pantoprazole (PROTONIX) 40 MG tablet 40 mg daily. Via PEG-tube     potassium bicarb-citric acid 20 mEq TbEF 20 mEq daily. Via PEG-tube     QUEtiapine (SEROQUEL) 25 MG tablet 25 mg 2 (two) times a day. Via PEG-tube give 25 mg am/pm with 12.5 mg mid day due to increased agitation     ticagrelor (BRILINTA) 90 mg Tab 90 mg 2 (two) times a day. Via PEG-tube     traZODone (DESYREL) 50 MG tablet 50 mg at bedtime. Via PEG-tube     white petrolatum (AQUAPHOR ORIGINAL) 41 % Oint Apply 1 application topically 2 (two) times a day. Apply to feet/legs         REVIEW OF SYSTEMS:  Unable to verbalize due to aphasia    PHYSICAL EXAMINATION:  Vitals:    11/09/20 1942   BP:  99/50   Pulse: (!) 102   Temp: 99.1  F (37.3  C)         GENERAL: Does not follow simple commands, makes eye contact, non verbal.  HEENT: Head is normocephalic with normal hair distribution. No evidence of trauma. Ears: No acute purulent discharge. Eyes: Conjunctivae pink with no scleral jaundice. Nose: Normal mucosa and septum. NECK: Supple with no cervical or supraclavicular lymphadenopathy. Trachea is midline, healing trach stoma  EXTREMITIES: Atraumatic. Full range of motion on both upper and lower extremities, there is no tenderness to palpation, no pedal edema, no cyanosis or clubbing, no calf tenderness, normal cap refill, no joint swelling.  SKIN: Warm and dry, no erythema noted, abdomen with g tube  NEUROLOGICAL: The patient is oriented to person, TBI    Social Distancing with PPE practiced due to Covid 19    LABS:    Lab Results   Component Value Date    WBC 8.3 08/06/2020    HGB 11.0 (L) 08/06/2020    HCT 34.9 (L) 08/06/2020    MCV 90 08/06/2020     08/06/2020       Results for orders placed or performed in visit on 10/26/20   Basic Metabolic Panel   Result Value Ref Range    Sodium 142 136 - 145 mmol/L    Potassium 4.8 3.5 - 5.0 mmol/L    Chloride 107 98 - 107 mmol/L    CO2 27 22 - 31 mmol/L    Anion Gap, Calculation 8 5 - 18 mmol/L    Glucose 104 70 - 125 mg/dL    Calcium 9.9 8.5 - 10.5 mg/dL    BUN 15 8 - 22 mg/dL    Creatinine 0.82 0.70 - 1.30 mg/dL    GFR MDRD Af Amer >60 >60 mL/min/1.73m2    GFR MDRD Non Af Amer >60 >60 mL/min/1.73m2           No results found for: HGBA1C  No results found for: [id number]  No results found for: MWLCOZXY86    ASSESSMENT/PLAN:  1. Hypoxic brain injury (H)    2. Aggression       Hypoxic Brain injury and aggression. Staff unable to meet needs n TCU due to increased agitation, EMS called and 4 EMT needed to control him due to agitation and aggression. To ED for eval.         Electronically signed by:  Fouzia High CNP  This progress note was completed using  Dragon software and there may be grammatical errors.

## 2021-06-12 NOTE — PROGRESS NOTES
[facility]      NAME:  Scot Bishop             :  1980    MRN: [id number]    CODE STATUS:  Full    [facility] [[id number]]       CHIEF COMPLAIN/REASON FOR VISIT:  Chief Complaint   Patient presents with     Review Of Multiple Medical Conditions     TBI       HISTORY OF PRESENT ILLNESS: Scot Bishop is a 40 y.o. male being seen for review of multiple medical conditions .ST has started supervised soft food feedings, and they will increase food textures. Nursing reports ongoing agitation, and resistive with cares due to his brain injury. .   [place] in May 2020. At that juncture he was in cardiac cath lab and suffered a hypoxic brain injury, high temp and thrombotic event. He is seen in his room today. Non verbal and did not track with his eyes. He will make eye contact. He has repetitive movement while in bed.He has repetitive laughing today. He varies between crying and laughing per nursing.  Moving legs and arms. Per nursing he  Slid from bed over week end, no apparent injuries, they are placing a scoop mattress for preventive falls. . He will need ongoing rehab services with PT/OT/ST. Has Gtube due to his dysphagia, which reviewed that weight has been stable.. Unfortunealy due to Scot Hypoxic brain injury he will not be able to understand hygiene education and nursing will need to meet these needs for pt. Therapy continues to work with him, seen ambualting with SBA two.Staff to anticipate needs and provide good elizabeth care two times a day and prn.   continues to work with family towards DC to a group home after TCU stay as patient will be unable to care for self, remains dependant on staff for all ADL and mobility as well as GTube for feedings.Wt stable but he needs frequent 1 to 1 when up out of bed HAS AGITATION ON SEROQUEL. Discontinue PLANNING ISSUES, NEED dicontinue TO ltc who will accept  him.        . No Known Allergies:     Current Outpatient Medications   Medication Sig     acetaminophen (TYLENOL) 650 mg/20.3 mL Soln 650 mg every 6 (six) hours as needed. Via PEG-tube      amantadine HCL (SYMMETREL) 50 mg/5 mL solution 100 mg every 12 (twelve) hours. Via PEG-tube     aspirin 81 MG EC tablet 81 mg daily. Via PEG-tube     atorvastatin (LIPITOR) 40 MG tablet 40 mg at bedtime. Via PEG-tube     bromocriptine (PARLODEL) 2.5 mg tablet 2.5 mg 2 (two) times a day. Via PEG-tube     chlorhexidine (PERIDEX) 0.12 % solution Apply 15 mL to the mouth or throat 4 (four) times a day.     levETIRAcetam (KEPPRA) 100 mg/mL solution 1,000 mg 2 (two) times a day. Via PEG-tube     lisinopriL (PRINIVIL,ZESTRIL) 2.5 MG tablet 1.25 mg daily. Via PEG-tube, hold if SBP <90     LORazepam (ATIVAN) 0.5 MG tablet 1 tablet (0.5 mg total) by G-tube route 4 (four) times a day for 3 days.     metoclopramide (REGLAN) 5 MG tablet 5 mg 2 (two) times a day. Via PEG-tube     metoprolol tartrate (LOPRESSOR) 25 MG tablet 6.25 mg 2 (two) times a day. Via PEG-tube, hold if SBP <90, HR <60     neomycin-bacitracin-polymyxin B (NEOSPORIN) 3.5-400-5,000 mg-unit-unit OiPk ointment Apply 1 application topically 2 (two) times a day.     omeprazole (PRILOSEC) 2 mg/mL SusR suspension 40 mg daily before breakfast. Via PEG-tube     pantoprazole (PROTONIX) 40 MG tablet 40 mg daily. Via PEG-tube     potassium bicarb-citric acid 20 mEq TbEF 20 mEq daily. Via PEG-tube     QUEtiapine (SEROQUEL) 25 MG tablet 25 mg 2 (two) times a day. Via PEG-tube give 25 mg am/pm with 12.5 mg mid day due to increased agitation     ticagrelor (BRILINTA) 90 mg Tab 90 mg 2 (two) times a day. Via PEG-tube     traZODone (DESYREL) 50 MG tablet 50 mg at bedtime. Via PEG-tube     white petrolatum (AQUAPHOR ORIGINAL) 41 % Oint Apply 1 application topically 2 (two) times a day. Apply to feet/legs         REVIEW OF SYSTEMS:  Unable to verbalize due to aphasia    PHYSICAL  EXAMINATION:  There were no vitals filed for this visit.      GENERAL: Does not follow simple commands, makes eye contact, non verbal.  HEENT: Head is normocephalic with normal hair distribution. No evidence of trauma. Ears: No acute purulent discharge. Eyes: Conjunctivae pink with no scleral jaundice. Nose: Normal mucosa and septum. NECK: Supple with no cervical or supraclavicular lymphadenopathy. Trachea is midline, healing trach stoma  Lungs : He is CTA  EXTREMITIES: Atraumatic. Full range of motion on both upper and lower extremities, there is no tenderness to palpation, no pedal edema, no cyanosis or clubbing, no calf tenderness, normal cap refill, no joint swelling.  SKIN: Warm and dry, no erythema noted, abdomen with g tube  NEUROLOGICAL: The patient is oriented to person, TBI  Social Distancing with PPE practiced due to Covid 19    LABS:    Lab Results   Component Value Date    WBC 8.3 08/06/2020    HGB 11.0 (L) 08/06/2020    HCT 34.9 (L) 08/06/2020    MCV 90 08/06/2020     08/06/2020       Results for orders placed or performed in visit on 10/05/20   Basic Metabolic Panel   Result Value Ref Range    Sodium 141 136 - 145 mmol/L    Potassium 4.2 3.5 - 5.0 mmol/L    Chloride 104 98 - 107 mmol/L    CO2 26 22 - 31 mmol/L    Anion Gap, Calculation 11 5 - 18 mmol/L    Glucose 106 70 - 125 mg/dL    Calcium 9.7 8.5 - 10.5 mg/dL    BUN 13 8 - 22 mg/dL    Creatinine 0.83 0.70 - 1.30 mg/dL    GFR MDRD Af Amer >60 >60 mL/min/1.73m2    GFR MDRD Non Af Amer >60 >60 mL/min/1.73m2           No results found for: HGBA1C  No results found for: [id number]  No results found for: [id number]    ASSESSMENT/PLAN:  1. G tube feedings (H)    2. Hypoxic brain injury (H)      1.. Dysphagia/Gtube : Has  Started  soft food feedings. Remains on TFing eneteral for nutrition, bolus feedings , all meds via gtube. Nursing will need strict VS and Lung assessment QS and we will stop in the event he does not tolerate well.Lungs clear  and no cough.  He continues to do well. ST reports will be starting new texture foods this week,he has been tolerating oral feedings with enterals.    2. Hypoxic Brain injury: Scot has plateau at current level. He has Seroquel due to agitation. Has aggressive behaviors requiring 1 to 1 services at times.     Electronically signed by:  Fouzia High CNP  This progress note was completed using Dragon software and there may be grammatical errors.

## 2021-06-12 NOTE — PROGRESS NOTES
[facility]      NAME:  Scot Bishop             :  1980    MRN: [id number]    CODE STATUS:      FACILITY: [facility] [[id number]]       CHIEF COMPLAIN/REASON FOR VISIT:  Chief Complaint   Patient presents with     Review Of Multiple Medical Conditions     rehab progress       HISTORY OF PRESENT ILLNESS: Scot Bishop is a 40 y.o. male being seen for review of multiple medical conditions . He did poorly on his recent swallow study at  due to his brain injury, agitation and short attention span..   [place] in May 2020. At that juncture he was in cardiac cath lab and suffered a hypoxic brain injury, high temp and thrombotic event. He is seen in his room today. Non verbal and did not track with his eyes. He will make eye contact. He has repetitive movement while in bed.He has repetitive laughing today. He varies between crying and laughing per nursing.  Moving legs and arms. Per nursing he  Slid from bed over week end, no apparent injuries, they are placing a scoop mattress for preventive falls. . He will need ongoing rehab services with PT/OT/ST. Has Gtube due to his dysphagia, which reviewed that weight has been stable.. Unfortunealy due to Scot Hypoxic brain injury he will not be able to understand hygiene education and nursing will need to meet these needs for pt. Therapy continues to work with him, seen ambualting with SBA two.Staff to anticipate needs and provide good elizabeth care two times a day and prn.   continues to work with family towards DC to a group home after TCU stay as patient will be unable to care for self, remains dependant on staff for all ADL and mobility as well as GTube for feedings.Wt stable at 168.2. Pt does continue with rehab services.BMP to be drawn today.   No Known Allergies:     Current Outpatient Medications   Medication Sig     acetaminophen (TYLENOL) 650 mg/20.3 mL Soln 650 mg  every 6 (six) hours as needed. Via PEG-tube      amantadine HCL (SYMMETREL) 50 mg/5 mL solution 100 mg every 12 (twelve) hours. Via PEG-tube     aspirin 81 MG EC tablet 81 mg daily. Via PEG-tube     atorvastatin (LIPITOR) 40 MG tablet 40 mg at bedtime. Via PEG-tube     bromocriptine (PARLODEL) 2.5 mg tablet 2.5 mg 2 (two) times a day. Via PEG-tube     chlorhexidine (PERIDEX) 0.12 % solution Apply 15 mL to the mouth or throat 4 (four) times a day.     levETIRAcetam (KEPPRA) 100 mg/mL solution 1,000 mg 2 (two) times a day. Via PEG-tube     lisinopriL (PRINIVIL,ZESTRIL) 2.5 MG tablet 1.25 mg daily. Via PEG-tube, hold if SBP <90     LORazepam (ATIVAN) 0.5 MG tablet 1 tablet (0.5 mg total) by G-tube route 4 (four) times a day.     metoclopramide (REGLAN) 5 MG tablet 5 mg 2 (two) times a day. Via PEG-tube     metoprolol tartrate (LOPRESSOR) 25 MG tablet 6.25 mg 2 (two) times a day. Via PEG-tube, hold if SBP <90, HR <60     neomycin-bacitracin-polymyxin B (NEOSPORIN) 3.5-400-5,000 mg-unit-unit OiPk ointment Apply 1 application topically 2 (two) times a day.     omeprazole (PRILOSEC) 2 mg/mL SusR suspension 40 mg daily before breakfast. Via PEG-tube     pantoprazole (PROTONIX) 40 MG tablet 40 mg daily. Via PEG-tube     potassium bicarb-citric acid 20 mEq TbEF 20 mEq daily. Via PEG-tube     QUEtiapine (SEROQUEL) 25 MG tablet 12.5 mg 3 (three) times a day. Via PEG-tube     ticagrelor (BRILINTA) 90 mg Tab 90 mg 2 (two) times a day. Via PEG-tube     traZODone (DESYREL) 50 MG tablet 50 mg at bedtime. Via PEG-tube     white petrolatum (AQUAPHOR ORIGINAL) 41 % Oint Apply 1 application topically 2 (two) times a day. Apply to feet/legs         REVIEW OF SYSTEMS:  Unable to verbalize due to aphasia    PHYSICAL EXAMINATION:  Vitals:    10/05/20 1954   BP: 106/74   Pulse: 95   Temp: 98  F (36.7  C)   Weight: 168 lb 3.2 oz (76.3 kg)         GENERAL: Does not follow simple commands, makes eye contact, non verbal.  HEENT: Head is  normocephalic with normal hair distribution. No evidence of trauma. Ears: No acute purulent discharge. Eyes: Conjunctivae pink with no scleral jaundice. Nose: Normal mucosa and septum. NECK: Supple with no cervical or supraclavicular lymphadenopathy. Trachea is midline, healing trach stoma  EXTREMITIES: Atraumatic. Full range of motion on both upper and lower extremities, there is no tenderness to palpation, no pedal edema, no cyanosis or clubbing, no calf tenderness, normal cap refill, no joint swelling.  SKIN: Warm and dry, no erythema noted, abdomen with g tube  NEUROLOGICAL: The patient is oriented to person, TBI  Social Distancing with PPE practiced due to Covid 19    LABS:    Lab Results   Component Value Date    WBC 8.3 08/06/2020    HGB 11.0 (L) 08/06/2020    HCT 34.9 (L) 08/06/2020    MCV 90 08/06/2020     08/06/2020       Results for orders placed or performed in visit on 10/05/20   Basic Metabolic Panel   Result Value Ref Range    Sodium 141 136 - 145 mmol/L    Potassium 4.2 3.5 - 5.0 mmol/L    Chloride 104 98 - 107 mmol/L    CO2 26 22 - 31 mmol/L    Anion Gap, Calculation 11 5 - 18 mmol/L    Glucose 106 70 - 125 mg/dL    Calcium 9.7 8.5 - 10.5 mg/dL    BUN 13 8 - 22 mg/dL    Creatinine 0.83 0.70 - 1.30 mg/dL    GFR MDRD Af Amer >60 >60 mL/min/1.73m2    GFR MDRD Non Af Amer >60 >60 mL/min/1.73m2           No results found for: HGBA1C  No results found for: [id number]  No results found for: MKOOCOFE54    ASSESSMENT/PLAN:  1. Dysphagia, unspecified type    2. Hypoxic brain damage (H)      1.. Dysphagia: Spoke to ST and plan is to start soft food feedings. Remains on TFing eneteral for nutrition, night feedings, all meds vis gtube. Nursing will need strict VS and Lung assessment QS and we will stopp in the event he does not tolerate well.    2. Hypoxic brain injury: Continues with therapies, IDT to review update tomorrow. DC planning ongoing, more info on 10/6/20.     Electronically signed by:  Fouzia  XIMENA High  This progress note was completed using Dragon software and there may be grammatical errors.

## 2021-06-12 NOTE — PROGRESS NOTES
[facility]  note      Patient: Scot Bishop  MRN: [id number]  Date of Service: 10/29/2020      [facility] [[id number]]  Reason for Visit     Chief Complaint   Patient presents with     Review Of Multiple Medical Conditions   Follow-up on /low bp/agitation    Code Status     FULL CODE    Assessment     -Increasing agitation due to which patient is refusing cares  -Persistent hypotension low blood pressures noted-  -Low-grade temperatures of unclear etiology  -Acute VF arrest secondary to acute ST SUKI  -Acute LAD occlusion with underlying history of coronary artery disease  -Hypoxic brain injury  - Severe dysphagia currently discharged on tube feeding; now with staff reporting patient constantly pulling tube feeding out  -Aspiration pneumonia currently resolved  -Acute hypoxic respiratory failure currently resolved  -History of lengthy stay in the hospital with multiple procedures including requiring intubation and ECMO support from 5 17-5 19 with tracheostomy and PEG placement.  -LOWELL currently resolved      Plan     Patient has been admitted to the TCU as a transfer from the hospital where he had a lengthy stay of more than 2 months.  He is on medical management.  Ability to tolerate medications in high doses has been limited because of profound hypotension with autonomic insufficiency.  He is on a low-dose of medications which are frequently held.  However weights have been stable.  Mood and behaviors reviewed he continues to get agitated and refusal of cares has been noted quite often.  Staff reports increasing aggressiveness.  Continue to monitor mood and behaviors   he is on psychotropic medications.  He has started trials of soft food feeding and noting some improvement.  Start holding tube feeding if he continues to progress.  Discharge planning reviewed with staff and they are looking at long-term care memory unit placement for him          History     Patient is a very  pleasant 40 y.o. male who is admitted to TCU  Patient has been admitted after a very lengthy more than 60-day stay in the hospital.  He presented following PEA/VF arrest in the community.  He was admitted and noted to have anterior acute ST SUKI.  He had multiple episodes of cardiac arrest  He required ECMO support and was emergently taken for cardiac catheterization which showed a thrombotic occlusion of the LAD which was treated with aspiration thrombectomy and PCI with drug-eluting stent of the proximal LAD.  He is currently in the TCU.  Unfortunately his ability to tolerate medication is limited due to profound hypotension.  Weights have been stable with no worsening lymphedema he is not verbally expressive aden and cannot communicate hence no evidence that he has had chest pain or discomfort at all    He also suffered from hypoxic brain injury.  Initial CT of the head was negative but EEG shows severe diffuse encephalopathy without seizure or epilepsy.  Repeat CT did show multifocal acute/subacute cerebral infarcts.  And brain injury which is consistent with hypoxic brain injury.  No improvement noted.  He continues to have behavioral dysregulation in spite of being on psychotropic medications.  Staff is reporting that as some degree of insight is returning he is becoming more agitated angry and aggressive with cares.  Today he was making threatening gestures to me and then turned his back on asking any questions.    He remains on tube feeding.  Some improvement noted in swallowing and he will be given trials of food noW   if he succeeds they can cut down on his tube feeding    Blood pressures continue to be labile with lows and highs noted he is on a much lower dose of medications now.  Unfortunately is not able to tolerate them because of autonomic insufficiency resulting in labile blood pressures          Past Medical History     Active Ambulatory (Non-Hospital) Problems    Diagnosis     Agitation     Low BP      G tube feedings (H)     Yeast infection     Impetigo any site     Hypoxic brain damage (H)     Acute respiratory failure with hypoxia (H)     Gastrojejunostomy tube status (H)     Hypoxic brain injury (H)     Dysphagia     Cardiac arrest (H)     Primary Lymphadenitis     Nicotine Dependence     Backache     Past Medical History:   Diagnosis Date     Acute respiratory failure with hypoxia (H)      LOWELL (acute kidney injury) (H)      Cardiac arrest (H)      Dysphagia      HTN (hypertension)      Hypoxic ischemic encephalopathy      NSTEMI (non-ST elevated myocardial infarction) (H)        Past Social History     Reviewed, and he  reports that he has been smoking. He has never used smokeless tobacco. He reports current alcohol use. He reports that he does not use drugs.    Family History     Reviewed, and family history includes Diabetes in his maternal aunt; Lung cancer in his maternal grandfather; Other in his father.    Medication List   Post Discharge Medication Reconciliation Status: discharge medications reconciled, continue medications without change   Current Outpatient Medications on File Prior to Visit   Medication Sig Dispense Refill     acetaminophen (TYLENOL) 650 mg/20.3 mL Soln 650 mg every 6 (six) hours as needed. Via PEG-tube        amantadine HCL (SYMMETREL) 50 mg/5 mL solution 100 mg every 12 (twelve) hours. Via PEG-tube       aspirin 81 MG EC tablet 81 mg daily. Via PEG-tube       atorvastatin (LIPITOR) 40 MG tablet 40 mg at bedtime. Via PEG-tube       bromocriptine (PARLODEL) 2.5 mg tablet 2.5 mg 2 (two) times a day. Via PEG-tube       chlorhexidine (PERIDEX) 0.12 % solution Apply 15 mL to the mouth or throat 4 (four) times a day.       levETIRAcetam (KEPPRA) 100 mg/mL solution 1,000 mg 2 (two) times a day. Via PEG-tube       lisinopriL (PRINIVIL,ZESTRIL) 2.5 MG tablet 1.25 mg daily. Via PEG-tube, hold if SBP <90       LORazepam (ATIVAN) 0.5 MG tablet 1 tablet (0.5 mg total) by G-tube route 4  (four) times a day for 3 days. 12 tablet 0     metoclopramide (REGLAN) 5 MG tablet 5 mg 2 (two) times a day. Via PEG-tube       metoprolol tartrate (LOPRESSOR) 25 MG tablet 6.25 mg 2 (two) times a day. Via PEG-tube, hold if SBP <90, HR <60       neomycin-bacitracin-polymyxin B (NEOSPORIN) 3.5-400-5,000 mg-unit-unit OiPk ointment Apply 1 application topically 2 (two) times a day.       omeprazole (PRILOSEC) 2 mg/mL SusR suspension 40 mg daily before breakfast. Via PEG-tube       pantoprazole (PROTONIX) 40 MG tablet 40 mg daily. Via PEG-tube       potassium bicarb-citric acid 20 mEq TbEF 20 mEq daily. Via PEG-tube       QUEtiapine (SEROQUEL) 25 MG tablet 25 mg 2 (two) times a day. Via PEG-tube give 25 mg am/pm with 12.5 mg mid day due to increased agitation       ticagrelor (BRILINTA) 90 mg Tab 90 mg 2 (two) times a day. Via PEG-tube       traZODone (DESYREL) 50 MG tablet 50 mg at bedtime. Via PEG-tube       white petrolatum (AQUAPHOR ORIGINAL) 41 % Oint Apply 1 application topically 2 (two) times a day. Apply to feet/legs       No current facility-administered medications on file prior to visit.        Allergies     No Known Allergies    Review of Systems   A comprehensive review of 14 systems was done. Pertinent findings noted here and in history of present illness. All the rest negative.  Constitutional: Negative.  Negative for fever, chills, he has activity change, appetite change and fatigue.   He has now been walking and taking a few steps.  He has been talking also  HENT: Negative for congestion and facial swelling.    Eyes: Negative for photophobia, redness and visual disturbance.   Respiratory: Negative for cough and chest tightness.    Cardiovascular: Negative for chest pain, palpitations and leg swelling.   Gastrointestinal: Negative for nausea, diarrhea, constipation, blood in stool and abdominal distention.   Genitourinary: Negative.    Musculoskeletal: Negative.  Hard to assess he does move his legs and  arms spontaneously but not to command  Skin: Negative.    Neurological: Negative for dizziness, tremors, syncope, weakness, light-headedness and headaches.   Constant scissoring like movement of his legs and arms noted.  Nonresponsive  Hematological: Does not bruise/bleed easily.   Psychiatric/Behavioral: Negative.  Unable to assess as patient does not cooperate  However today noted to be talking spontaneously  Staff reporting that he is not sleeping well.  He has significant anxiety and frequently rolls out of bed  He continues to have more anger and aggression issues especially with staff and frequently makes angry expressions as well as resistance to cares      Physical Exam     Recent Vitals 10/26/2020   Weight 166 lbs   BP 98/60   Pulse 98   Temp 98.5   Some recent data might be hidden   Weight is 168 pounds blood pressure 80/46 temp 99  pulse 105    Constitutional:  appears well-developed.   HEENT:  Normocephalic and atraumatic.  Eyes: Conjunctivae and EOM are normal. Pupils are equal, round, and reactive to light. No discharge.  No scleral icterus. Nose normal. Mouth/Throat: Oropharynx is clear and moist. No oropharyngeal exudate.    NECK: Normal range of motion. Neck supple. No JVD present. No tracheal deviation present. No thyromegaly present.   CARDIOVASCULAR: Normal rate, regular rhythm and intact distal pulses.  Exam reveals no gallop and no friction rub.  Systolic murmur present.  PULMONARY: Effort normal and breath sounds normal. No respiratory distress.No Wheezing or rales.  ABDOMEN: Soft. Bowel sounds are normal. No distension and no mass.  There is no tenderness. There is no rebound and no guarding. No HSM.  Does have a G-tube   MUSCULOSKELETAL: Normal range of motion. No edema and no tenderness. Mild kyphosis, no tenderness.  Hard to assess as patient does not respond to verbal commands or follow them.  He seems to be spontaneously moving his arms and legs though  LYMPH NODES: Has no cervical,  supraclavicular, axillary and groin adenopathy.   NEUROLOGICAL: Alert and oriented to NONE. No cranial nerve deficit.  ABNormal muscle tone. Coordination normal.   He does not make any eye contact.  Does not follow verbal commands either  GENITOURINARY: Deferred exam.  SKIN: Skin is warm and dry. No rash noted. No erythema. No pallor.   Excoriation of the skin in the abdomen noted  EXTREMITIES: No cyanosis, no clubbing, no edema. No Deformity.  PSYCHIATRIC: mood, affect and behavior is hard to assess because of noncommunication.  However making angry gestures      Lab Results     Results for orders placed or performed in visit on 10/26/20   Basic Metabolic Panel   Result Value Ref Range    Sodium 142 136 - 145 mmol/L    Potassium 4.8 3.5 - 5.0 mmol/L    Chloride 107 98 - 107 mmol/L    CO2 27 22 - 31 mmol/L    Anion Gap, Calculation 8 5 - 18 mmol/L    Glucose 104 70 - 125 mg/dL    Calcium 9.9 8.5 - 10.5 mg/dL    BUN 15 8 - 22 mg/dL    Creatinine 0.82 0.70 - 1.30 mg/dL    GFR MDRD Af Amer >60 >60 mL/min/1.73m2    GFR MDRD Non Af Amer >60 >60 mL/min/1.73m2             DARIO Vargas

## 2021-06-12 NOTE — TELEPHONE ENCOUNTER
[facility] Nurse Triage Telephone Note      Provider: PATO Saldana  Facility: [facility]    Facility Type: TCU    Caller: Farzaneh   Call Back Number:  [phone]    Allergies: Patient has no known allergies.    Reason for call: BMP results- all WNL   No concerns at this time.      Verbal Order/Direction given by Provider: KELVIN    Provider giving order: PATO Saldana    Verbal order given to: Farzaneh Nielsen RN

## 2021-06-12 NOTE — TELEPHONE ENCOUNTER
RN called from his facility stating that they have run out of his supply of lorazepam which he takes 0.5 mg 4 times daily scheduled.  Sending emergency supply of 12 tablets to get him through the weekend.  Further refills should come from provider at facility.

## 2021-06-12 NOTE — PROGRESS NOTES
[facility]      NAME:  Scot Bishop             :  1980    MRN: [id number]    CODE STATUS:  Full    FACILITY: [facility] [[id number]]       CHIEF COMPLAIN/REASON FOR VISIT:  Chief Complaint   Patient presents with     Problem Visit     agitation       HISTORY OF PRESENT ILLNESS: Scot Bishop is a 40 y.o. male being seen for review of multiple medical conditions . Nursing reports he has had an increased in agitated behaviors. He has ongoing pacing and hard to redirect due to constant motion. He is a TBI. ST has started supervised soft food feedings, and they will increase food textures. Nursing reports ongoing agitation, and resistive with cares due to his brain injury.  [facility] in May 2020. At that juncture he was in cardiac cath lab and suffered a hypoxic brain injury, high temp and thrombotic event. He is seen in his room today. Non verbal and did not track with his eyes. He will make eye contact. He has repetitive movement while in bed.He has repetitive laughing today. He varies between crying and laughing per nursing.  Moving legs and arms. Per nursing he  Slid from bed over week end, no apparent injuries, they are placing a scoop mattress for preventive falls. . He will need ongoing rehab services with PT/OT/ST. Has Gtube due to his dysphagia, which reviewed that weight has been stable.. Unfortunealy due to Scot Hypoxic brain injury he will not be able to understand hygiene education and nursing will need to meet these needs for pt. Therapy continues to work with him, seen ambualting with SBA two.Staff to anticipate needs and provide good elizabeth care two times a day and prn.   continues to work with family towards DC to a group home after TCU stay as patient will be unable to care for self, remains dependant on staff for all ADL and mobility as well as GTube for feedings.Wt stable but he needs frequent 1  to 1 when up out of bed .  Nursing reports ongoing agitation, prn.      . No Known Allergies:     Current Outpatient Medications   Medication Sig     acetaminophen (TYLENOL) 650 mg/20.3 mL Soln 650 mg every 6 (six) hours as needed. Via PEG-tube      amantadine HCL (SYMMETREL) 50 mg/5 mL solution 100 mg every 12 (twelve) hours. Via PEG-tube     aspirin 81 MG EC tablet 81 mg daily. Via PEG-tube     atorvastatin (LIPITOR) 40 MG tablet 40 mg at bedtime. Via PEG-tube     bromocriptine (PARLODEL) 2.5 mg tablet 2.5 mg 2 (two) times a day. Via PEG-tube     chlorhexidine (PERIDEX) 0.12 % solution Apply 15 mL to the mouth or throat 4 (four) times a day.     levETIRAcetam (KEPPRA) 100 mg/mL solution 1,000 mg 2 (two) times a day. Via PEG-tube     lisinopriL (PRINIVIL,ZESTRIL) 2.5 MG tablet 1.25 mg daily. Via PEG-tube, hold if SBP <90     LORazepam (ATIVAN) 0.5 MG tablet 1 tablet (0.5 mg total) by G-tube route 4 (four) times a day for 3 days.     metoclopramide (REGLAN) 5 MG tablet 5 mg 2 (two) times a day. Via PEG-tube     metoprolol tartrate (LOPRESSOR) 25 MG tablet 6.25 mg 2 (two) times a day. Via PEG-tube, hold if SBP <90, HR <60     neomycin-bacitracin-polymyxin B (NEOSPORIN) 3.5-400-5,000 mg-unit-unit OiPk ointment Apply 1 application topically 2 (two) times a day.     omeprazole (PRILOSEC) 2 mg/mL SusR suspension 40 mg daily before breakfast. Via PEG-tube     pantoprazole (PROTONIX) 40 MG tablet 40 mg daily. Via PEG-tube     potassium bicarb-citric acid 20 mEq TbEF 20 mEq daily. Via PEG-tube     QUEtiapine (SEROQUEL) 25 MG tablet 25 mg 2 (two) times a day. Via PEG-tube give 25 mg am/pm with 12.5 mg mid day due to increased agitation     ticagrelor (BRILINTA) 90 mg Tab 90 mg 2 (two) times a day. Via PEG-tube     traZODone (DESYREL) 50 MG tablet 50 mg at bedtime. Via PEG-tube     white petrolatum (AQUAPHOR ORIGINAL) 41 % Oint Apply 1 application topically 2 (two) times a day. Apply to feet/legs         REVIEW OF  SYSTEMS:  Unable to verbalize due to aphasia    PHYSICAL EXAMINATION:  Vitals:    11/02/20 1530   BP: 92/58   Pulse: 79   Temp: 97  F (36.1  C)   Weight: 166 lb (75.3 kg)         GENERAL: Does not follow simple commands, makes eye contact, non verbal.  HEENT: Head is normocephalic with normal hair distribution. No evidence of trauma. Ears: No acute purulent discharge. Eyes: Conjunctivae pink with no scleral jaundice. Nose: Normal mucosa and septum. NECK: Supple with no cervical or supraclavicular lymphadenopathy. Trachea is midline, healing trach stoma  Lungs : He is CTA  EXTREMITIES: Atraumatic. Full range of motion on both upper and lower extremities, there is no tenderness to palpation, no pedal edema, no cyanosis or clubbing, no calf tenderness, normal cap refill, no joint swelling.  SKIN: Warm and dry, no erythema noted, abdomen with g tube  NEUROLOGICAL: The patient is oriented to person, TBI  Social Distancing with PPE practiced due to Covid 19    LABS:    Lab Results   Component Value Date    WBC 8.3 08/06/2020    HGB 11.0 (L) 08/06/2020    HCT 34.9 (L) 08/06/2020    MCV 90 08/06/2020     08/06/2020       Results for orders placed or performed in visit on 10/26/20   Basic Metabolic Panel   Result Value Ref Range    Sodium 142 136 - 145 mmol/L    Potassium 4.8 3.5 - 5.0 mmol/L    Chloride 107 98 - 107 mmol/L    CO2 27 22 - 31 mmol/L    Anion Gap, Calculation 8 5 - 18 mmol/L    Glucose 104 70 - 125 mg/dL    Calcium 9.9 8.5 - 10.5 mg/dL    BUN 15 8 - 22 mg/dL    Creatinine 0.82 0.70 - 1.30 mg/dL    GFR MDRD Af Amer >60 >60 mL/min/1.73m2    GFR MDRD Non Af Amer >60 >60 mL/min/1.73m2           No results found for: HGBA1C  No results found for: [id number]  No results found for: [id number]    ASSESSMENT/PLAN:  1. Hypoxic brain injury (H)    2. Agitation      1. Hypoxic Brain injury: Scot has plateau at current level. He has scheduled  Seroquel due to agitation. discontinue prn Seroquel today. Has  aggressive behaviors requiring 1 to 1 services at times.    2. Agitation: Restless and pacing. Did have nursing give an additional Seroquel due to agitation, on scheduled  seroquel as well.      Electronically signed by:  Fouzia High CNP  This progress note was completed using Dragon software and there may be grammatical errors.

## 2021-06-12 NOTE — PROGRESS NOTES
[facility]      NAME:  Scot Bishop             :  1980    MRN: [id number]    CODE STATUS:  Full    FACILITY: [facility] [[id number]]       CHIEF COMPLAIN/REASON FOR VISIT:  Chief Complaint   Patient presents with     Review Of Multiple Medical Conditions       HISTORY OF PRESENT ILLNESS: Scot Bishop is a 40 y.o. male being seen for review of multiple medical conditions . Nursing reports he has had a leaking gtube, it was not leaking today. They can monitor. Met with Lucy JONES and we reviewed advancement of diet. At this juncture needs enteral food for nutrition. He basically eating for pleasure feedings.  He is a TBI. ST has started supervised food feedings, and they will increase food textures. Nursing reports ongoing agitation, and resistive with cares due to his brain injury.  [place] [facility] in May 2020. At that juncture he was in cardiac cath lab and suffered a hypoxic brain injury, high temp and thrombotic event. He is seen in his room today. Non verbal and did not track with his eyes. He will make eye contact. He has repetitive movement while in bed.He has repetitive laughing today. He varies between crying and laughing per nursing.  Moving legs and arms. Per nursing he  Slid from bed over week end, no apparent injuries, they are placing a scoop mattress for preventive falls. . He will need ongoing rehab services with PT/OT/ST. Has Gtube due to his dysphagia, which reviewed that weight has been stable.. Unfortunealy due to Scot Hypoxic brain injury he will not be able to understand hygiene education and nursing will need to meet these needs for pt. Therapy continues to work with him, seen ambualting with SBA two.Staff to anticipate needs and provide good elizabeth care two times a day and prn.   continues to work with family towards DC to a group home after TCU stay as patient will be unable to care for self,  remains dependant on staff for all ADL and mobility as well as GTube for feedings.Wt stable but he needs frequent 1 to 1 when up out of bed .  Nursing reports ongoing agitation, prn.He has scheduled seroquel due to anoxic brain injury      . No Known Allergies:     Current Outpatient Medications   Medication Sig     acetaminophen (TYLENOL) 650 mg/20.3 mL Soln 650 mg every 6 (six) hours as needed. Via PEG-tube      amantadine HCL (SYMMETREL) 50 mg/5 mL solution 100 mg every 12 (twelve) hours. Via PEG-tube     aspirin 81 MG EC tablet 81 mg daily. Via PEG-tube     atorvastatin (LIPITOR) 40 MG tablet 40 mg at bedtime. Via PEG-tube     bromocriptine (PARLODEL) 2.5 mg tablet 2.5 mg 2 (two) times a day. Via PEG-tube     chlorhexidine (PERIDEX) 0.12 % solution Apply 15 mL to the mouth or throat 4 (four) times a day.     levETIRAcetam (KEPPRA) 100 mg/mL solution 1,000 mg 2 (two) times a day. Via PEG-tube     lisinopriL (PRINIVIL,ZESTRIL) 2.5 MG tablet 1.25 mg daily. Via PEG-tube, hold if SBP <90     LORazepam (ATIVAN) 0.5 MG tablet 1 tablet (0.5 mg total) by G-tube route 4 (four) times a day for 3 days.     metoclopramide (REGLAN) 5 MG tablet 5 mg 2 (two) times a day. Via PEG-tube     metoprolol tartrate (LOPRESSOR) 25 MG tablet 6.25 mg 2 (two) times a day. Via PEG-tube, hold if SBP <90, HR <60     neomycin-bacitracin-polymyxin B (NEOSPORIN) 3.5-400-5,000 mg-unit-unit OiPk ointment Apply 1 application topically 2 (two) times a day.     omeprazole (PRILOSEC) 2 mg/mL SusR suspension 40 mg daily before breakfast. Via PEG-tube     pantoprazole (PROTONIX) 40 MG tablet 40 mg daily. Via PEG-tube     potassium bicarb-citric acid 20 mEq TbEF 20 mEq daily. Via PEG-tube     QUEtiapine (SEROQUEL) 25 MG tablet 25 mg 2 (two) times a day. Via PEG-tube give 25 mg am/pm with 12.5 mg mid day due to increased agitation     ticagrelor (BRILINTA) 90 mg Tab 90 mg 2 (two) times a day. Via PEG-tube     traZODone (DESYREL) 50 MG tablet 50 mg at  bedtime. Via PEG-tube     white petrolatum (AQUAPHOR ORIGINAL) 41 % Oint Apply 1 application topically 2 (two) times a day. Apply to feet/legs         REVIEW OF SYSTEMS:  Unable to verbalize due to aphasia    PHYSICAL EXAMINATION:  Vitals:    11/07/20 1330   BP: 99/59   Pulse: 89   Temp: 97.2  F (36.2  C)   Weight: 166 lb (75.3 kg)         GENERAL: Does not follow simple commands, makes eye contact, non verbal.  HEENT: Head is normocephalic with normal hair distribution. No evidence of trauma. Ears: No acute purulent discharge. Eyes: Conjunctivae pink with no scleral jaundice. Nose: Normal mucosa and septum. NECK: Supple with no cervical or supraclavicular lymphadenopathy. Trachea is midline, healing trach stoma  Lungs : He is CTA  EXTREMITIES: Atraumatic. Full range of motion on both upper and lower extremities, there is no tenderness to palpation, no pedal edema, no cyanosis or clubbing, no calf tenderness, normal cap refill, no joint swelling.  SKIN: Warm and dry, no erythema noted, abdomen with g tube  NEUROLOGICAL: The patient is oriented to person, TBI    Social Distancing with PPE practiced due to Covid 19    LABS:    Lab Results   Component Value Date    WBC 8.3 08/06/2020    HGB 11.0 (L) 08/06/2020    HCT 34.9 (L) 08/06/2020    MCV 90 08/06/2020     08/06/2020       Results for orders placed or performed in visit on 10/26/20   Basic Metabolic Panel   Result Value Ref Range    Sodium 142 136 - 145 mmol/L    Potassium 4.8 3.5 - 5.0 mmol/L    Chloride 107 98 - 107 mmol/L    CO2 27 22 - 31 mmol/L    Anion Gap, Calculation 8 5 - 18 mmol/L    Glucose 104 70 - 125 mg/dL    Calcium 9.9 8.5 - 10.5 mg/dL    BUN 15 8 - 22 mg/dL    Creatinine 0.82 0.70 - 1.30 mg/dL    GFR MDRD Af Amer >60 >60 mL/min/1.73m2    GFR MDRD Non Af Amer >60 >60 mL/min/1.73m2           No results found for: HGBA1C  No results found for: [id number]  No results found for: TQFPPJJA10    ASSESSMENT/PLAN:  1. Hypoxic brain damage (H)    2.  G tube feedings (H)      1. Hypoxic Brain injury: Scot has plateau at current level. He has scheduled  Seroquel due to agitation. discontinue prn Seroquel today. Has aggressive behaviors requiring 1 to 1 services at times.    2. Gtube feedings: Will require ongoin feeds enteral, on pleasure foods only and needs strict supervision with meals. His tube flushed w/o any leaking today.   Electronically signed by:  Fouzia High CNP  This progress note was completed using Dragon software and there may be grammatical errors.

## 2021-06-13 NOTE — TELEPHONE ENCOUNTER
[facility] Nurse Triage Telephone Note      Provider: Cortney Bahena MD  Facility: [facility] Type: TCU    Caller: Gerda  Call Back Number:  [phone]    Allergies: Patient has no known allergies.    Reason for call: Pt to ER yesterday 3pm for  & fever & returned 8pm. Temp this am 102.7, given Tylenol, came down to 100.0. BP 85/51 metoprolol held. Pt H/O MI & hypoxic encephaolopathy, NPO with TF 375cc four times a day & water flush 85cc before and after TF with additional water 250cc three times a day. Unresponsive, opens eyes-not new. RA sat 95%, no wound, no matson.  ST attempting to work with him but unresponsive.    Verbal Order/Direction given by Provider: His portion of brain controlling BP & temps has been affected. Change additional water flushes to 300cc four times a day. Enc family to have virtual visits to keep updated on pt condition & enc discussion for palliative Hsopice care approach. Add to check BMP to labs 12/7.    Provider giving order: Cortney Bahena MD    Verbal order given to: Gerda Morrow, RN

## 2021-06-13 NOTE — PROGRESS NOTES
[facility]      NAME:  Scot Bishop             :  1980    MRN: [id number]    CODE STATUS: DNR    FACILITY: [facility] [[id number]]       CHIEF COMPLAIN/REASON FOR VISIT:  Chief Complaint   Patient presents with     Review Of Multiple Medical Conditions     re admitted from acute care       HISTORY OF PRESENT ILLNESS: Scot Bishop is a 40 y.o. male being seen for review of multiple medical conditions . Nursing reports he is back from the hospital after an extensive 3-week stay, he had aggressive behaviors here on the TCU and was sent out and was followed by psychiatric care at [facility].  He is a TBI and seen in his room this a.m. nonverbal he has a history of aphasia very calm poor eye contact.  [place] in May 2020. At that juncture he was in cardiac cath lab and suffered a hypoxic brain injury, high temp and thrombotic event. He is seen in his room today. Non verbal and did not track with his eyes. He will make eye contact. He has repetitive movement while in bed.He has repetitive laughing today. He varies between crying and laughing per nursing.  Moving legs and arms. Per nursing he  Slid from bed over week end, no apparent injuries, they are placing a scoop mattress for preventive falls. . He will need ongoing rehab services with PT/OT/ST. Has Gtube due to his dysphagia, which reviewed that weight has been stable.. Unfortunealy due to Scot Hypoxic brain injury he will not be able to understand hygiene education and nursing will need to meet these needs for pt. Therapy continues to work with him, seen ambualting with SBA two.Staff to anticipate needs and provide good elizabeth care two times a day and prn.   continues to work with family towards DC to a group home after TCU stay as patient will be unable to care for self, remains dependant on staff for all ADL and mobility as well as GTube for feedings,  he also has an order for puréed diet level 4 with thick nectar liquids.  However at this juncture we will look at the tube feeding this is more pleasure feedings and if he gets aggressive he could aspirate.    . No Known Allergies:     Current Outpatient Medications   Medication Sig     haloperidoL (HALDOL) 5 MG tablet 5 mg by G-tube route 3 (three) times a day.     PARoxetine (PAXIL) 20 MG tablet 20 mg by G-tube route every morning.     acetaminophen (TYLENOL) 650 mg/20.3 mL Soln 650 mg every 6 (six) hours as needed. Via PEG-tube      amantadine HCL (SYMMETREL) 50 mg/5 mL solution 100 mg every 12 (twelve) hours. Via PEG-tube     aspirin 81 MG EC tablet 81 mg daily. Via PEG-tube     atorvastatin (LIPITOR) 40 MG tablet 40 mg at bedtime. Via PEG-tube     lisinopriL (PRINIVIL,ZESTRIL) 2.5 MG tablet 1.25 mg daily. Via PEG-tube, hold if SBP <90     LORazepam (ATIVAN) 0.5 MG tablet 1 tablet (0.5 mg total) by G-tube route 4 (four) times a day for 3 days.     metoprolol tartrate (LOPRESSOR) 25 MG tablet 6.25 mg 2 (two) times a day. Via PEG-tube, hold if SBP <90, HR <60     neomycin-bacitracin-polymyxin B (NEOSPORIN) 3.5-400-5,000 mg-unit-unit OiPk ointment Apply 1 application topically 2 (two) times a day.     omeprazole (PRILOSEC) 2 mg/mL SusR suspension 40 mg daily before breakfast. Via PEG-tube     potassium bicarb-citric acid 20 mEq TbEF 20 mEq daily. Via PEG-tube     QUEtiapine (SEROQUEL) 25 MG tablet 50 mg 3 (three) times a day. Via PEG-tube give 25 mg am/pm with 12.5 mg mid day due to increased agitation     ticagrelor (BRILINTA) 90 mg Tab 90 mg 2 (two) times a day. Via PEG-tube     traZODone (DESYREL) 50 MG tablet 50 mg at bedtime. Via PEG-tube     white petrolatum (AQUAPHOR ORIGINAL) 41 % Oint Apply 1 application topically 2 (two) times a day. Apply to feet/legs         REVIEW OF SYSTEMS:  Unable to verbalize due to aphasia    PHYSICAL EXAMINATION:  Vitals:    12/01/20 0823   BP: 98/72   Pulse: 100   Temp: 97.1  F  (36.2  C)   Weight: 166 lb (75.3 kg)         GENERAL: Does not follow simple commands, makes eye contact, non verbal.  HEENT: Head is normocephalic with normal hair distribution. No evidence of trauma. Ears: No acute purulent discharge. Eyes: Conjunctivae pink with no scleral jaundice. Nose: Normal mucosa and septum. NECK: Supple with no cervical or supraclavicular lymphadenopathy. Trachea is midline, healing trach stoma  Lungs : He is CTA  EXTREMITIES: Atraumatic. Full range of motion on both upper and lower extremities, there is no tenderness to palpation, no pedal edema, no cyanosis or clubbing, no calf tenderness, normal cap refill, no joint swelling.  SKIN: Warm and dry, no erythema noted, abdomen with g tube  NEUROLOGICAL: The patient is oriented to person, TBI    Social Distancing with PPE practiced due to Covid 19    LABS:    Lab Results   Component Value Date    WBC 8.3 08/06/2020    HGB 11.0 (L) 08/06/2020    HCT 34.9 (L) 08/06/2020    MCV 90 08/06/2020     08/06/2020       Results for orders placed or performed in visit on 10/26/20   Basic Metabolic Panel   Result Value Ref Range    Sodium 142 136 - 145 mmol/L    Potassium 4.8 3.5 - 5.0 mmol/L    Chloride 107 98 - 107 mmol/L    CO2 27 22 - 31 mmol/L    Anion Gap, Calculation 8 5 - 18 mmol/L    Glucose 104 70 - 125 mg/dL    Calcium 9.9 8.5 - 10.5 mg/dL    BUN 15 8 - 22 mg/dL    Creatinine 0.82 0.70 - 1.30 mg/dL    GFR MDRD Af Amer >60 >60 mL/min/1.73m2    GFR MDRD Non Af Amer >60 >60 mL/min/1.73m2           No results found for: HGBA1C  No results found for: [id number]  No results found for: [id number]    ASSESSMENT/PLAN:  1. Acute respiratory failure with hypoxia (H)    2. Hypoxic brain injury (H)    3. Gastrojejunostomy tube status (H)      1/2.Acute resp filure/ Hypoxic Brain injury: Scot is in a semi vegative state, med changes made in acute care is less aggressive. Total dependance on staff for all ADL and med/tube management. Therapy to  start assessing for POC while on TCU.     3. Gtube feedings: Will require ongoing feeds enteral, on bolus feedings of Isososorce 1.5, four times a day with free ater flushes. He can also have puree level 4 with mildly thick nectar but would need close supervision.    Post Discharge Medication Reconciliation Status: discharge medications reconciled, continue medications without change     Electronically signed by:  Fouzia High CNP  This progress note was completed using Dragon software and there may be grammatical errors.

## 2021-06-13 NOTE — PROGRESS NOTES
[facility]      NAME:  Scot Bishop             :  1980    MRN: [id number]    CODE STATUS: DNR    FACILITY: [facility] [[id number]]       CHIEF COMPLAIN/REASON FOR VISIT:  Chief Complaint   Patient presents with     Problem Visit     thrush       HISTORY OF PRESENT ILLNESS: Scot Bishop is a 40 y.o. male being seen perST as she felt he has thrush. Assessment difficult due to brain injury but what isseem is some white patches on toungue. . Nursing reports he is back from the hospital after an extensive 3-week stay, he had aggressive behaviors here on the TCU and was sent out and was followed by psychiatric care at [facility].  He is a TBI and seen in his room this a.m. nonverbal he has a history of aphasia very calm poor eye contact.  [place] in May 2020. At that juncture he was in cardiac cath lab and suffered a hypoxic brain injury, high temp and thrombotic event. He is seen in his room today. Non verbal and did not track with his eyes. He will make eye contact. H. He will need ongoing rehab services with PT/OT/ST. Has Gtube due to his dysphagia,  Unfortunealy due to Scot Hypoxic brain injury he will not be able to understand hygiene education and nursing will need to meet these needs for pt. Therapy continues to work with him, seen ambualting with SBA two.Staff to anticipate needs and provide good elizabeth care two times a day and prn.   continues to work with family towards DC to a group home after TCU stay as patient will be unable to care for self, remains dependant on staff for all ADL and mobility as well as GTube for feedings, he also has an order for puréed diet level 4 with thick nectar liquids.  However at this juncture we will look at the tube feeding this is more pleasure feedings and if he gets aggressive he could aspirate. Pt did get a work up r/t temp with abnormal labs, he is pallative care  and we will have follow-up labs on 12/10/20.      . No Known Allergies:     Current Outpatient Medications   Medication Sig     acetaminophen (TYLENOL) 650 mg/20.3 mL Soln 650 mg every 6 (six) hours as needed. Via PEG-tube      amantadine HCL (SYMMETREL) 50 mg/5 mL solution 100 mg every 12 (twelve) hours. Via PEG-tube     aspirin 81 MG EC tablet 81 mg daily. Via PEG-tube     atorvastatin (LIPITOR) 40 MG tablet 40 mg at bedtime. Via PEG-tube     haloperidoL (HALDOL) 5 MG tablet 5 mg by G-tube route 3 (three) times a day.     lisinopriL (PRINIVIL,ZESTRIL) 2.5 MG tablet 1.25 mg daily. Via PEG-tube, hold if SBP <90     LORazepam (ATIVAN) 0.5 MG tablet 1 tablet (0.5 mg total) by G-tube route 4 (four) times a day for 3 days.     metoprolol tartrate (LOPRESSOR) 25 MG tablet 6.25 mg 2 (two) times a day. Via PEG-tube, hold if SBP <90, HR <60     neomycin-bacitracin-polymyxin B (NEOSPORIN) 3.5-400-5,000 mg-unit-unit OiPk ointment Apply 1 application topically 2 (two) times a day.     omeprazole (PRILOSEC) 2 mg/mL SusR suspension 40 mg daily before breakfast. Via PEG-tube     PARoxetine (PAXIL) 20 MG tablet 20 mg by G-tube route every morning.     QUEtiapine (SEROQUEL) 25 MG tablet 50 mg 3 (three) times a day. Via PEG-tube give 25 mg am/pm with 12.5 mg mid day due to increased agitation     ticagrelor (BRILINTA) 90 mg Tab 90 mg 2 (two) times a day. Via PEG-tube     traZODone (DESYREL) 50 MG tablet 50 mg at bedtime. Via PEG-tube     white petrolatum (AQUAPHOR ORIGINAL) 41 % Oint Apply 1 application topically 2 (two) times a day. Apply to feet/legs         REVIEW OF SYSTEMS:  Unable to verbalize due to aphasia    PHYSICAL EXAMINATION:  Vitals:    12/09/20 1907   BP: 97/58   Pulse: (!) 110   Temp: 99.7  F (37.6  C)   Weight: 159 lb (72.1 kg)         GENERAL: Does not follow simple commands, makes eye contact, non verbal.  HEENT: Head is normocephalic with normal hair distribution. No evidence of trauma.Oral: reddened wth white  coating on tongue left inner cheek Ears: No acute purulent discharge. Eyes: Conjunctivae pink with no scleral jaundice. Nose: Normal mucosa and septum. NECK: Supple with no cervical or supraclavicular lymphadenopathy. Trachea is midline, healing trach stomaLungs : He is CTA  EXTREMITIES: Atraumatic. Full range of motion on both upper and lower extremities, there is no tenderness to palpation, no pedal edema, no cyanosis or clubbing, no calf tenderness, normal cap refill, no joint swelling.  SKIN: Warm and dry, no erythema noted, abdomen with g tube  NEUROLOGICAL: The patient is oriented to person, TBI    Social Distancing with PPE practiced due to Covid 19    LABS:    Lab Results   Component Value Date    WBC 10.6 12/08/2020    HGB 13.7 (L) 12/08/2020    HCT 43.8 12/08/2020    MCV 94 12/08/2020     12/08/2020       Results for orders placed or performed in visit on 12/08/20   Basic Metabolic Panel   Result Value Ref Range    Sodium 149 (H) 136 - 145 mmol/L    Potassium 3.8 3.5 - 5.0 mmol/L    Chloride 112 (H) 98 - 107 mmol/L    CO2 25 22 - 31 mmol/L    Anion Gap, Calculation 12 5 - 18 mmol/L    Glucose 137 (H) 70 - 125 mg/dL    Calcium 8.8 8.5 - 10.5 mg/dL    BUN 23 (H) 8 - 22 mg/dL    Creatinine 0.74 0.70 - 1.30 mg/dL    GFR MDRD Af Amer >60 >60 mL/min/1.73m2    GFR MDRD Non Af Amer >60 >60 mL/min/1.73m2           No results found for: HGBA1C  No results found for: [id number]  No results found for: IVPNJBMO23    ASSESSMENT/PLAN:  1. Hypoxic brain injury (H)    2. Thrush, oral        1.. Hypoxic Brain injury: Scot is in a semi vegative state, med changes made in acute care and  is less aggressive now. Total dependance on staff for all ADL and med/tube management.  Pallative consult with [facility] in place    2. Oral thrush: Do to high risk aspiration I feel we will not be able to use nystatin swish/swallow as he is unabe to comprehend to spit. Give Diflucan 150 mg gt x 1 dose           Electronically signed  by:  Fouzia High, CNP  This progress note was completed using Dragon software and there may be grammatical errors.

## 2021-06-13 NOTE — TELEPHONE ENCOUNTER
[facility] Nurse Triage Telephone Note      Provider: Cortney Bahena MD  Facility: [facility] Type: TCU    Caller: Mago  Call Back Number:  [phone]    Allergies: Patient has no known allergies.    Reason for call: Nurse calling to report Heme 2, BMP, hepatic profile, and Mg level.  Of note, patient is getting 375ml of water before and after feeding 4 times per day.  Other notable meds:  Tylenol 650mg four times a day and Q 12 hours PRN, ASA 81mg daily, Metoprolol 6.25mg two times a day, Brillinta 90mg two times a day.       Verbal Order/Direction given by Provider: Check a CMP on 12/10/20.      Provider giving order: Cortney Bahena MD    Verbal order given to: Mago Ortiz RN

## 2021-06-13 NOTE — PROGRESS NOTES
[facility]      NAME:  Scot Bishop             :  1980    MRN: [id number]    CODE STATUS: DNR    FACILITY: [facility] [[id number]]       CHIEF COMPLAIN/REASON FOR VISIT:  Chief Complaint   Patient presents with     Problem Visit     temp       HISTORY OF PRESENT ILLNESS: Scot Bishop is a 40 y.o. male being seen per nursing request as temp is 100.8 this am.  Nursing reports he is back from the hospital after an extensive 3-week stay, he had aggressive behaviors here on the TCU and was sent out and was followed by psychiatric care at [facility].  He is a TBI and seen in his room this a.m. nonverbal he has a history of aphasia very calm poor eye contact.  [facility] to a [facility] in May 2020. At that juncture he was in cardiac cath lab and suffered a hypoxic brain injury, high temp and thrombotic event. He is seen in his room today. Non verbal and did not track with his eyes. He will make eye contact. He has repetitive movement while in bed.He has repetitive laughing today. He varies between crying and laughing per nursing.  Moving legs and arms. Per nursing he  Slid from bed over week end, no apparent injuries, they are placing a scoop mattress for preventive falls. . He will need ongoing rehab services with PT/OT/ST. Has Gtube due to his dysphagia, which reviewed that weight has been stable.. Unfortunealy due to Scot Hypoxic brain injury he will not be able to understand hygiene education and nursing will need to meet these needs for pt. Therapy continues to work with him, seen ambualting with SBA two.Staff to anticipate needs and provide good elizabeth care two times a day and prn.   continues to work with family towards DC to a group home after TCU stay as patient will be unable to care for self, remains dependant on staff for all ADL and mobility as well as GTube for feedings, he also has an order for puréed diet level 4  with thick nectar liquids.  However at this juncture we will look at the tube feeding this is more pleasure feedings and if he gets aggressive he could aspirate. Pt did get a work up r/t temp with abnormal labs, he is pallative care and we will obtain a ua this am.    . No Known Allergies:     Current Outpatient Medications   Medication Sig     acetaminophen (TYLENOL) 650 mg/20.3 mL Soln 650 mg every 6 (six) hours as needed. Via PEG-tube      amantadine HCL (SYMMETREL) 50 mg/5 mL solution 100 mg every 12 (twelve) hours. Via PEG-tube     aspirin 81 MG EC tablet 81 mg daily. Via PEG-tube     atorvastatin (LIPITOR) 40 MG tablet 40 mg at bedtime. Via PEG-tube     haloperidoL (HALDOL) 5 MG tablet 5 mg by G-tube route 3 (three) times a day.     lisinopriL (PRINIVIL,ZESTRIL) 2.5 MG tablet 1.25 mg daily. Via PEG-tube, hold if SBP <90     LORazepam (ATIVAN) 0.5 MG tablet 1 tablet (0.5 mg total) by G-tube route 4 (four) times a day for 3 days.     metoprolol tartrate (LOPRESSOR) 25 MG tablet 6.25 mg 2 (two) times a day. Via PEG-tube, hold if SBP <90, HR <60     neomycin-bacitracin-polymyxin B (NEOSPORIN) 3.5-400-5,000 mg-unit-unit OiPk ointment Apply 1 application topically 2 (two) times a day.     omeprazole (PRILOSEC) 2 mg/mL SusR suspension 40 mg daily before breakfast. Via PEG-tube     PARoxetine (PAXIL) 20 MG tablet 20 mg by G-tube route every morning.     QUEtiapine (SEROQUEL) 25 MG tablet 50 mg 3 (three) times a day. Via PEG-tube give 25 mg am/pm with 12.5 mg mid day due to increased agitation     ticagrelor (BRILINTA) 90 mg Tab 90 mg 2 (two) times a day. Via PEG-tube     traZODone (DESYREL) 50 MG tablet 50 mg at bedtime. Via PEG-tube     white petrolatum (AQUAPHOR ORIGINAL) 41 % Oint Apply 1 application topically 2 (two) times a day. Apply to feet/legs         REVIEW OF SYSTEMS:  Unable to verbalize due to aphasia    PHYSICAL EXAMINATION:  There were no vitals filed for this visit.      GENERAL: Does not follow simple  commands, makes eye contact, non verbal.  HEENT: Head is normocephalic with normal hair distribution. No evidence of trauma. Ears: No acute purulent discharge. Eyes: Conjunctivae pink with no scleral jaundice. Nose: Normal mucosa and septum. NECK: Supple with no cervical or supraclavicular lymphadenopathy. Trachea is midline, healing trach stoma  Lungs : He is CTA  EXTREMITIES: Atraumatic. Full range of motion on both upper and lower extremities, there is no tenderness to palpation, no pedal edema, no cyanosis or clubbing, no calf tenderness, normal cap refill, no joint swelling.  SKIN: Warm and dry, no erythema noted, abdomen with g tube  NEUROLOGICAL: The patient is oriented to person, TBI    Social Distancing with PPE practiced due to Covid 19    LABS:    Lab Results   Component Value Date    WBC 17.0 (H) 12/01/2020    HGB 15.9 12/01/2020    HCT 52.2 12/01/2020    MCV 95 12/01/2020     12/01/2020       Results for orders placed or performed in visit on 12/01/20   Basic Metabolic Panel   Result Value Ref Range    Sodium 149 (H) 136 - 145 mmol/L    Potassium 5.6 (H) 3.5 - 5.0 mmol/L    Chloride 111 (H) 98 - 107 mmol/L    CO2 14 (L) 22 - 31 mmol/L    Anion Gap, Calculation 24 (H) 5 - 18 mmol/L    Glucose 101 70 - 125 mg/dL    Calcium 10.8 (H) 8.5 - 10.5 mg/dL    BUN 38 (H) 8 - 22 mg/dL    Creatinine 1.11 0.70 - 1.30 mg/dL    GFR MDRD Af Amer >60 >60 mL/min/1.73m2    GFR MDRD Non Af Amer >60 >60 mL/min/1.73m2           No results found for: HGBA1C  No results found for: [id number]  No results found for: [id number]    ASSESSMENT/PLAN:  1. Hypoxic brain injury (H)    2. Fever, unspecified fever cause      1. Hypoxic Brain injury: Scot is in a semi vegative state, med changes made in acute care is less aggressive. Total dependance on staff for all ADL and med/tube management. Therapy to start assessing for POC while on TCU. Pallative consult with [facility] in place    2. Fever: May be brain injury r/t due to  brain injury. Did have some dill on 12/1 with rounding MD but we will check a UA as well.         Electronically signed by:  Fouzia High CNP  This progress note was completed using Dragon software and there may be grammatical errors.

## 2021-06-13 NOTE — PROGRESS NOTES
[facility] Admission note      Patient: Scot Bishop  MRN: [id number]        [facility] [[id number]]  Reason for Visit     Chief Complaint   Patient presents with     Problem Visit   abnormal labs/ fever    Code Status     dnr /comfort foccused    Assessment     - acute hypernatremia na worse on recheck 149  - fever with temp  101.2 in TCU  -Abnormal liver function enzymes with elevated ALT  -Hypoalbuminemia with a low prealbumin of 3.2  -Hyperchloremia with a chloride level of 112  - excessive sedation and pt noted to be sleeping and poorly arousable  - =s/p anoxic brain injury   - Dysphagia on TF  -- long hospitalization with agitated /aggressive behavoir      Plan     Patient has been readmitted to the TCU  He was readmitted in the hospital and had an extensive work-up and stay with both neurology and psychiatry along with palliative care involved in his care.  He was seen as follow-up for his mood and behaviors as well as highly abnormal labs.  He was sent to the emergency room at the request of family for work-up of fever.  Labs reviewed and he had extensive imaging of the chest abdomen and pelvis with no acute finding.  CT of the brain was negative.  Urine culture has shown no growth and labs show resolution of his leukocytosis  His recheck white count today is improved at 10.6  Unfortunately he has persistent hypernatremia as well as hyperchloremia.  Labs also show abnormal liver enzymes remain elevated ALT now.  He is on tube feeding and his water flushes have been increased to almost 3-1/2 L within 24 hours.  His diuretics have been discontinued.  His BUN has improved indicating that his dehydration is resolving.  Suspect this could be medication related also.  Continue to push his fluids.  We will decrease his Depakote sparingly from 125 mg 4 tablets twice daily to 3 tablets twice daily.  Family has been calling requesting a taper of his medications.  Since he has had  significant aggravation of behaviors requiring behavioral health placement will not discontinue his meds completely but would like to taper him slowly.  Staff requested to monitor and update for any change in behaviors.  Palliative approach to cares has been requested however family wants to pursue treatments for now.  Continue with his care plan and recheck labs tomorrow a.m.  Consider ultrasound of the liver if labs remain persistently elevated    History     Patient is a very pleasant 40 y.o. male who is readmitted to TCU  Pt  Is admitted to behavioral health unit due to aggressive behaviors; with hitting staff and difficulty to care  Various interventions were tried and were unsuccessful and eventually patient was started on psychotropic meds.  Eventually his behaviors escalated and he had been sent to the behavioral health unit he comes back on high doses of medication including Seroquel trazodone.  He is also on Depakote 4 times a day.  However he was sent back to the hospital because of persistent fever with leukocytosis and highly abnormal labs.  Recheck labs were reviewed today and he continues to have elevated sodium.  Recheck is 149.  He is currently off his Lasix.  He is getting almost close to 4 L of fluid/plain water in addition to her tube feeding daily.  This is reflected in improvement in his BUN.  In addition his liver enzymes are now elevated which I suspect is medication related.  He continues to have fever on and off with no localizing symptoms extensive work-up done in the TCU was negative.  Subsequently he was sent back to the emergency room where he had imaging of his CT abdomen pelvis as well as chest which was negative and nonconclusive.  He had a CT of his brain which also did not show any acute findings.  Suspect this is baseline brainstem dysfunction  At baseline he is nonambulatory and bedbound does not make any eye contact or respond in any way to any stimulus  BP ARE LOW    Past  Medical History     Active Ambulatory (Non-Hospital) Problems    Diagnosis     Fever     Aggression     Agitation     Low BP     G tube feedings (H)     Yeast infection     Impetigo any site     Hypoxic brain damage (H)     Acute respiratory failure with hypoxia (H)     Gastrojejunostomy tube status (H)     Hypoxic brain injury (H)     Dysphagia     Cardiac arrest (H)     Primary Lymphadenitis     Nicotine Dependence     Backache     Past Medical History:   Diagnosis Date     Acute respiratory failure with hypoxia (H)      Aggression 11/09/2020     Agitation 09/21/2020     LOWELL (acute kidney injury) (H)      Back injury, sequela 10/27/2017     Cardiac arrest (H)      Cardiac arrest (H) 05/17/2020     Cognitive disorder 11/11/2020     Dysphagia      Dysphagia 11/11/2020     Gastrojejunostomy tube status (H) 11/11/2020     HTN (hypertension)      Hypoxic ischemic encephalopathy      Low blood pressure 09/17/2020     Nonverbal 11/11/2020     NSTEMI (non-ST elevated myocardial infarction) (H)      TBI (traumatic brain injury) (H) 06/25/2020       Past Social History     Reviewed, and he  reports that he has been smoking. He has been smoking about 1.00 pack per day. He has never used smokeless tobacco. He reports current alcohol use. He reports that he does not use drugs.    Family History     Reviewed, and family history includes Cancer in his maternal grandfather; Diabetes in his maternal aunt; Diabetes type I in his maternal aunt; Lung cancer in his maternal grandfather; Other in his father.    Medication List   Post Discharge Medication Reconciliation Status:    QUEtiapine (SEROQUEL) 50 mg tablet    Indications: Aggression Administer 1 tablet via G-tube 3 times daily.   0 11/27/2020     haloperidoL (HALDOL) 5 mg tablet    Indications: Aggression Administer 1 tablet via G-tube 3 times daily.   0 11/27/2020     divalproex sprinkles (DEPAKOTE SPRINKLES) 125 mg capsule    Indications: Aggression 4 capsules 2 times daily.  G tube   0 11/27/2020     traZODone (DESYREL) 50 mg tablet    Indications: Aggression Administer 1 tablet via G-tube at bedtime.   0 11/27/2020     potassium chloride (K-APOLLO) 20 mEq packet    Indications: Dehydration 1 Packet once daily with a meal. G-tube   0 11/27/2020     omeprazole (PRILOSEC) 2 mg/mL susp oral suspension    Indications: Dysphagia, unspecified type Administer 20 mL via G-tube once daily.   0 11/27/2020     metoprolol tartrate (LOPRESSOR) 25 mg tablet    Indications: Cardiac arrest (HC) Administer 0.25 tablets via G-tube 2 times daily.   0 11/27/2020     acetaminophen (TYLENOL) 325 mg tablet    Indications: History of anoxic brain injury Administer 2 tablets via G-tube every 6 hours if needed. Max acetaminophen dose: 4000mg in 24 hrs.    0 11/27/2020     ticagrelor (BRILINTA) 90 mg tablet    Indications: Cardiac arrest (HC) Administer 1 tablet via nasogastric tube 2 times daily.   0 11/27/2020     PARoxetine (PAXIL) 20 mg tablet    Indications: Aggression Administer 1 tablet via G-tube once daily.   0 11/27/2020     atorvastatin (LIPITOR) 40 mg tablet    Indications: Cardiac arrest (HC) Administer 1 tablet via G-tube at bedtime.   0 11/27/2020     aspirin chewable 81 mg chewable tablet    Indications: Cardiac arrest (HC) Administer 1 tablet via G-tube once daily.   0 11/27/2020     amantadine HCL (SYMMETREL) 50 mg/5 mL solution    Indications: History of anoxic brain injury Administer 10 mL via G-tube every 12              Allergies     No Known Allergies    Review of Systems   A comprehensive review of 14 systems was done. Pertinent findings noted here and in history of present illness. All the rest negative.  Constitutional: Negative.  Negative for fever, chills, he has activity change, appetite change and fatigue.   HENT: Negative for congestion and facial swelling.    Eyes: Negative for photophobia, redness and visual disturbance.   Respiratory: Negative for cough and chest tightness.     Cardiovascular: Negative for chest pain, palpitations and leg swelling.   Gastrointestinal: Negative for nausea, diarrhea, constipation, blood in stool and abdominal distention.   Has incontinence  Noted to be scratching his buttocks and has rectal / perianal bleeding  Genitourinary: Negative.    Musculoskeletal: Negative.  Remains WC bound  Skin: Negative.    Neurological: Negative for dizziness, tremors, syncope, weakness, light-headedness and headaches.   Hematological: Does not bruise/bleed easily.   Psychiatric/Behavioral: Negative.  Sedated and sleepy      Physical Exam     Recent Vitals 12/8/2020   Weight 158 lbs   /73   Pulse 99   Temp 100.7   Temp src -   SpO2 -   Some recent data might be hidden   t 101  p133 bp 102/65    Constitutional: Oriented to none and appears well-developed.   HEENT:  Normocephalic and atraumatic.  Eyes: Conjunctivae and EOM are normal. Pupils are equal, round, and reactive to light. No discharge.  No scleral icterus. Nose normal. Mouth/Throat: Oropharynx is clear and moist. No oropharyngeal exudate.    NECK: Normal range of motion. Neck supple. No JVD present. No tracheal deviation present. No thyromegaly present.   CARDIOVASCULAR: Normal rate, regular rhythm and intact distal pulses.  Exam reveals no gallop and no friction rub.  Systolic murmur present.  PULMONARY: Effort normal and breath sounds normal. No respiratory distress.No Wheezing or rales.  ABDOMEN: Soft. Bowel sounds are normal. No distension and no mass.  There is no tenderness. There is no rebound and no guarding. No HSM.  Peg tube present  Perianal skin intact ; slight excoriation  MUSCULOSKELETAL: Normal range of motion. No edema and no tenderness. Mild kyphosis, no tenderness.  LYMPH NODES: Has no cervical, supraclavicular, axillary and groin adenopathy.   NEUROLOGICAL: Alert and oriented to none. No cranial nerve deficit.  Normal muscle tone. Coordination normal.   GENITOURINARY: Deferred exam.  SKIN: Skin  is warm and dry. No rash noted. No erythema. No pallor.   EXTREMITIES: No cyanosis, no clubbing, no edema. No Deformity.  PSYCHIATRIC: Normal mood, affect and behavior.sleepy and poorly responsive      Lab Results     Recent Results (from the past 240 hour(s))   Basic Metabolic Panel   Result Value Ref Range    Sodium 149 (H) 136 - 145 mmol/L    Potassium 5.6 (H) 3.5 - 5.0 mmol/L    Chloride 111 (H) 98 - 107 mmol/L    CO2 14 (L) 22 - 31 mmol/L    Anion Gap, Calculation 24 (H) 5 - 18 mmol/L    Glucose 101 70 - 125 mg/dL    Calcium 10.8 (H) 8.5 - 10.5 mg/dL    BUN 38 (H) 8 - 22 mg/dL    Creatinine 1.11 0.70 - 1.30 mg/dL    GFR MDRD Af Amer >60 >60 mL/min/1.73m2    GFR MDRD Non Af Amer >60 >60 mL/min/1.73m2   Magnesium   Result Value Ref Range    Magnesium 2.3 1.8 - 2.6 mg/dL   Hepatic Profile   Result Value Ref Range    Bilirubin, Total 0.8 0.0 - 1.0 mg/dL    Bilirubin, Direct 0.2 <=0.5 mg/dL    Protein, Total 9.3 (H) 6.0 - 8.0 g/dL    Albumin 4.4 3.5 - 5.0 g/dL    Alkaline Phosphatase 104 45 - 120 U/L    AST 30 0 - 40 U/L    ALT 25 0 - 45 U/L   HM1 (CBC with Diff)   Result Value Ref Range    WBC 17.0 (H) 4.0 - 11.0 thou/uL    RBC 5.47 4.40 - 6.20 mill/uL    Hemoglobin 15.9 14.0 - 18.0 g/dL    Hematocrit 52.2 40.0 - 54.0 %    MCV 95 80 - 100 fL    MCH 29.1 27.0 - 34.0 pg    MCHC 30.5 (L) 32.0 - 36.0 g/dL    RDW 15.5 (H) 11.0 - 14.5 %    Platelets 346 140 - 440 thou/uL    MPV 12.0 8.5 - 12.5 fL    Neutrophils % 86 (H) 50 - 70 %    Lymphocytes % 7 (L) 20 - 40 %    Monocytes % 6 2 - 10 %    Eosinophils % 0 0 - 6 %    Basophils % 0 0 - 2 %    Immature Granulocyte % 1 (H) <=0 %    Neutrophils Absolute 14.6 (H) 2.0 - 7.7 thou/uL    Lymphocytes Absolute 1.2 0.8 - 4.4 thou/uL    Monocytes Absolute 1.0 (H) 0.0 - 0.9 thou/uL    Eosinophils Absolute 0.0 0.0 - 0.4 thou/uL    Basophils Absolute 0.1 0.0 - 0.2 thou/uL    Immature Granulocyte Absolute 0.2 (H) <=0.0 thou/uL   Urinalysis   Result Value Ref Range    Color, UA Orange (!)  Colorless, Yellow, Straw, Light Yellow    Clarity, UA Turbid (!) Clear    Glucose, UA Negative Negative    Bilirubin, UA Negative Negative    Ketones, UA Negative Negative    Specific Gravity, UA 1.034 (H) 1.001 - 1.030    Blood, UA Negative Negative    pH, UA 6.0 4.5 - 8.0    Protein, UA Trace (!) Negative mg/dL    Urobilinogen, UA <2.0 E.U./dL <2.0 E.U./dL, 2.0 E.U./dL    Nitrite, UA Negative Negative    Leukocytes, UA Negative Negative    Bacteria, UA None Seen None Seen hpf    RBC, UA 0-2 None Seen, 0-2 hpf    WBC, UA 0-5 None Seen, 0-5 hpf    Squam Epithel, UA 0-5 None Seen, 0-5 lpf    Amorphous, UA Many (!) None Seen   Culture, Urine    Specimen: Urine, Straight Cath   Result Value Ref Range    Culture No Growth    Basic Metabolic Panel   Result Value Ref Range    Sodium 152 (HH) 136 - 145 mmol/L    Potassium 3.9 3.5 - 5.0 mmol/L    Chloride 114 (H) 98 - 107 mmol/L    CO2 27 22 - 31 mmol/L    Anion Gap, Calculation 11 5 - 18 mmol/L    Glucose 135 (H) 70 - 125 mg/dL    Calcium 9.8 8.5 - 10.5 mg/dL    BUN 39 (H) 8 - 22 mg/dL    Creatinine 0.91 0.70 - 1.30 mg/dL    GFR MDRD Af Amer >60 >60 mL/min/1.73m2    GFR MDRD Non Af Amer >60 >60 mL/min/1.73m2   COVID-19 VIRUS (CORONAVIRUS) BY PCR - EXTERNAL RESULT    Specimen: Other    Specimen type and source: Other, Specimen from nasopharyngeal structure (specimen)   Result Value Ref Range    COVID-19 Virus by PCR (External Result) Negative Negative   COVID-19 Virus PCR MRF    Specimen: Respiratory   Result Value Ref Range    COVID-19 VIRUS SPECIMEN SOURCE Nasopharyngeal     2019-nCOV Not Detected    Basic Metabolic Panel   Result Value Ref Range    Sodium 149 (H) 136 - 145 mmol/L    Potassium 3.8 3.5 - 5.0 mmol/L    Chloride 112 (H) 98 - 107 mmol/L    CO2 25 22 - 31 mmol/L    Anion Gap, Calculation 12 5 - 18 mmol/L    Glucose 137 (H) 70 - 125 mg/dL    Calcium 8.8 8.5 - 10.5 mg/dL    BUN 23 (H) 8 - 22 mg/dL    Creatinine 0.74 0.70 - 1.30 mg/dL    GFR MDRD Af Amer >60 >60  mL/min/1.73m2    GFR MDRD Non Af Amer >60 >60 mL/min/1.73m2   HM2(CBC w/o Differential)   Result Value Ref Range    WBC 10.6 4.0 - 11.0 thou/uL    RBC 4.68 4.40 - 6.20 mill/uL    Hemoglobin 13.7 (L) 14.0 - 18.0 g/dL    Hematocrit 43.8 40.0 - 54.0 %    MCV 94 80 - 100 fL    MCH 29.3 27.0 - 34.0 pg    MCHC 31.3 (L) 32.0 - 36.0 g/dL    RDW 14.7 (H) 11.0 - 14.5 %    Platelets 171 140 - 440 thou/uL    MPV 13.5 (H) 8.5 - 12.5 fL   Magnesium   Result Value Ref Range    Magnesium 2.3 1.8 - 2.6 mg/dL   Hepatic Profile   Result Value Ref Range    Bilirubin, Total 0.7 0.0 - 1.0 mg/dL    Bilirubin, Direct 0.2 <=0.5 mg/dL    Protein, Total 7.1 6.0 - 8.0 g/dL    Albumin 3.2 (L) 3.5 - 5.0 g/dL    Alkaline Phosphatase 79 45 - 120 U/L    AST 54 (H) 0 - 40 U/L     (H) 0 - 45 U/L           DARIO Vargas

## 2021-06-13 NOTE — PROGRESS NOTES
[facility]      NAME:  Scot Bishop             :  1980    MRN: [id number]    CODE STATUS: DNR    FACILITY: [facility] [[id number]]       CHIEF COMPLAIN/REASON FOR VISIT:  Chief Complaint   Patient presents with     Problem Visit     temp       HISTORY OF PRESENT ILLNESS: Scot Bishop is a 40 y.o. male being seen per nursing request as temp is 100.7 this am. He has had neg ua, neg cxr and negative covid. Suggestive of brain stem deregulation. Nursing reports he is back from the hospital after an extensive 3-week stay, he had aggressive behaviors here on the TCU and was sent out and was followed by psychiatric care at [facility].  He is a TBI and seen in his room this a.m. nonverbal he has a history of aphasia very calm poor eye contact.  [place] [facility] [place]. At that juncture he was in cardiac cath lab and suffered a hypoxic brain injury, high temp and thrombotic event. He is seen in his room today. Non verbal and did not track with his eyes. He will make eye contact. He has repetitive movement while in bed.He has repetitive laughing today. He varies between crying and laughing per nursing.  Moving legs and arms. Per nursing he  Slid from bed over week end, no apparent injuries, they are placing a scoop mattress for preventive falls. . He will need ongoing rehab services with PT/OT/ST. Has Gtube due to his dysphagia, which reviewed that weight has been stable.. Unfortunealy due to Scot Hypoxic brain injury he will not be able to understand hygiene education and nursing will need to meet these needs for pt. Therapy continues to work with him, seen ambualting with [facility] two.Staff to anticipate needs and provide good elizabeth care two times a day and prn.   continues to work with family towards DC to a group home after TCU stay as patient will be unable to care for self, remains dependant on staff for all ADL and  mobility as well as GTube for feedings, he also has an order for puréed diet level 4 with thick nectar liquids.  However at this juncture we will look at the tube feeding this is more pleasure feedings and if he gets aggressive he could aspirate. Pt did get a work up r/t temp with abnormal labs, he is pallative care and we will have follow-up labs on 12/8/20.      . No Known Allergies:     Current Outpatient Medications   Medication Sig     acetaminophen (TYLENOL) 650 mg/20.3 mL Soln 650 mg every 6 (six) hours as needed. Via PEG-tube      amantadine HCL (SYMMETREL) 50 mg/5 mL solution 100 mg every 12 (twelve) hours. Via PEG-tube     aspirin 81 MG EC tablet 81 mg daily. Via PEG-tube     atorvastatin (LIPITOR) 40 MG tablet 40 mg at bedtime. Via PEG-tube     haloperidoL (HALDOL) 5 MG tablet 5 mg by G-tube route 3 (three) times a day.     lisinopriL (PRINIVIL,ZESTRIL) 2.5 MG tablet 1.25 mg daily. Via PEG-tube, hold if SBP <90     LORazepam (ATIVAN) 0.5 MG tablet 1 tablet (0.5 mg total) by G-tube route 4 (four) times a day for 3 days.     metoprolol tartrate (LOPRESSOR) 25 MG tablet 6.25 mg 2 (two) times a day. Via PEG-tube, hold if SBP <90, HR <60     neomycin-bacitracin-polymyxin B (NEOSPORIN) 3.5-400-5,000 mg-unit-unit OiPk ointment Apply 1 application topically 2 (two) times a day.     omeprazole (PRILOSEC) 2 mg/mL SusR suspension 40 mg daily before breakfast. Via PEG-tube     PARoxetine (PAXIL) 20 MG tablet 20 mg by G-tube route every morning.     QUEtiapine (SEROQUEL) 25 MG tablet 50 mg 3 (three) times a day. Via PEG-tube give 25 mg am/pm with 12.5 mg mid day due to increased agitation     ticagrelor (BRILINTA) 90 mg Tab 90 mg 2 (two) times a day. Via PEG-tube     traZODone (DESYREL) 50 MG tablet 50 mg at bedtime. Via PEG-tube     white petrolatum (AQUAPHOR ORIGINAL) 41 % Oint Apply 1 application topically 2 (two) times a day. Apply to feet/legs         REVIEW OF SYSTEMS:  Unable to verbalize due to  aphasia    PHYSICAL EXAMINATION:  Vitals:    12/08/20 0734   BP: 100/73   Pulse: 99   Temp: (!) 100.7  F (38.2  C)   Weight: 158 lb (71.7 kg)         GENERAL: Does not follow simple commands, makes eye contact, non verbal.  HEENT: Head is normocephalic with normal hair distribution. No evidence of trauma. Ears: No acute purulent discharge. Eyes: Conjunctivae pink with no scleral jaundice. Nose: Normal mucosa and septum. NECK: Supple with no cervical or supraclavicular lymphadenopathy. Trachea is midline, healing trach stoma  Lungs : He is CTA  EXTREMITIES: Atraumatic. Full range of motion on both upper and lower extremities, there is no tenderness to palpation, no pedal edema, no cyanosis or clubbing, no calf tenderness, normal cap refill, no joint swelling.  SKIN: Warm and dry, no erythema noted, abdomen with g tube  NEUROLOGICAL: The patient is oriented to person, TBI    Social Distancing with PPE practiced due to Covid 19    LABS:    Lab Results   Component Value Date    WBC 17.0 (H) 12/01/2020    HGB 15.9 12/01/2020    HCT 52.2 12/01/2020    MCV 95 12/01/2020     12/01/2020       Results for orders placed or performed in visit on 12/03/20   Basic Metabolic Panel   Result Value Ref Range    Sodium 152 (HH) 136 - 145 mmol/L    Potassium 3.9 3.5 - 5.0 mmol/L    Chloride 114 (H) 98 - 107 mmol/L    CO2 27 22 - 31 mmol/L    Anion Gap, Calculation 11 5 - 18 mmol/L    Glucose 135 (H) 70 - 125 mg/dL    Calcium 9.8 8.5 - 10.5 mg/dL    BUN 39 (H) 8 - 22 mg/dL    Creatinine 0.91 0.70 - 1.30 mg/dL    GFR MDRD Af Amer >60 >60 mL/min/1.73m2    GFR MDRD Non Af Amer >60 >60 mL/min/1.73m2           No results found for: HGBA1C  No results found for: [id number]  No results found for: [id number]    ASSESSMENT/PLAN:  1. Fever, unspecified fever cause    2. Hypoxic brain injury (H)        1. Fever: Covid, CXR, UA negative. Follow-up CBC, HEP panel and BMP in am. Give tylenol prn for antipyretic    2. Hypoxic Brain injury:  Scot is in a semi vegative state, med changes made in acute care and  is less aggressive now. Total dependance on staff for all ADL and med/tube management.  Pallative consult with [facility] in place           Electronically signed by:  Fouzia High CNP  This progress note was completed using Dragon software and there may be grammatical errors.
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
## 2021-06-13 NOTE — PROGRESS NOTES
[facility]      NAME:  Scot Bishop             :  1980    MRN: [id number]    CODE STATUS: DNR    FACILITY: [facility] [[id number]]       CHIEF COMPLAIN/REASON FOR VISIT:  Chief Complaint   Patient presents with     Problem Visit     SEDATION       HISTORY OF PRESENT ILLNESS: Scot Bishop is a 40 y.o. male being seen per today for review of multiple medical conditions. He is back from the hospital after an extensive 3-week stay, he had aggressive behaviors here on the TCU and was sent out and was followed by psychiatric care at [facility]. We have been monitoring his LOC and has been sleepy, has TBI due to hypoxia.   He is a TBI and seen in his room this a.m. nonverbal he has a history of aphasia very calm poor eye contact.  [place] [facility] in May 2020. At that juncture he was in cardiac cath lab and suffered a hypoxic brain injury, high temp and thrombotic event. He is seen in his room today. Non verbal and did not track with his eyes. He will make eye contact. H. He will need ongoing rehab services with PT/OT/ST. Has Gtube due to his dysphagia,  Unfortunealy due to Scot Hypoxic brain injury he will not be able to understand hygiene education and nursing will need to meet these needs for pt. Therapy continues to work with him, seen ambualting with SBA two.Staff to anticipate needs and provide good elizabeth care two times a day and prn.   continues to work with family towards DC to a group home after TCU stay as patient will be unable to care for self, remains dependant on staff for all ADL and mobility as well as GTube for feedings, he also has an order for puréed diet level 4 with thick nectar liquids.  However at this juncture we will look at the tube feeding this is more pleasure feedings and if he gets aggressive he could aspirate. Scot is very awake today, has had decrease in his Depakote recently reduced and  he is much more awake  Today with good eye contact. I have reviewed Scot case with other rounding physician as we considered decrease in psycotropics but at this juncture will not make changes.      . No Known Allergies:     Current Outpatient Medications   Medication Sig     acetaminophen (TYLENOL) 650 mg/20.3 mL Soln 650 mg every 6 (six) hours as needed. Via PEG-tube      amantadine HCL (SYMMETREL) 50 mg/5 mL solution 100 mg every 12 (twelve) hours. Via PEG-tube     aspirin 81 MG EC tablet 81 mg daily. Via PEG-tube     atorvastatin (LIPITOR) 40 MG tablet 40 mg at bedtime. Via PEG-tube     haloperidoL (HALDOL) 5 MG tablet 5 mg by G-tube route 3 (three) times a day.     lisinopriL (PRINIVIL,ZESTRIL) 2.5 MG tablet 1.25 mg daily. Via PEG-tube, hold if SBP <90     LORazepam (ATIVAN) 0.5 MG tablet 1 tablet (0.5 mg total) by G-tube route 4 (four) times a day for 3 days.     metoprolol tartrate (LOPRESSOR) 25 MG tablet 6.25 mg 2 (two) times a day. Via PEG-tube, hold if SBP <90, HR <60     neomycin-bacitracin-polymyxin B (NEOSPORIN) 3.5-400-5,000 mg-unit-unit OiPk ointment Apply 1 application topically 2 (two) times a day.     omeprazole (PRILOSEC) 2 mg/mL SusR suspension 40 mg daily before breakfast. Via PEG-tube     PARoxetine (PAXIL) 20 MG tablet 20 mg by G-tube route every morning.     QUEtiapine (SEROQUEL) 25 MG tablet 50 mg 3 (three) times a day. Via PEG-tube give 25 mg am/pm with 12.5 mg mid day due to increased agitation     ticagrelor (BRILINTA) 90 mg Tab 90 mg 2 (two) times a day. Via PEG-tube     traZODone (DESYREL) 50 MG tablet 50 mg at bedtime. Via PEG-tube     white petrolatum (AQUAPHOR ORIGINAL) 41 % Oint Apply 1 application topically 2 (two) times a day. Apply to feet/legs         REVIEW OF SYSTEMS:  Unable to verbalize due to aphasia    PHYSICAL EXAMINATION:  Vitals:    12/16/20 1517   BP: 106/73   Pulse: (!) 114   Temp: 98.3  F (36.8  C)   Weight: 159 lb 9.6 oz (72.4 kg)         GENERAL: Does not follow  simple commands, makes eye contact, non verbal.  HEENT: Head is normocephalic with normal hair distribution. No evidence of trauma.Oral: reddened wth white coating on tongue left inner cheek Ears: No acute purulent discharge. Eyes: Conjunctivae pink with no scleral jaundice. Nose: Normal mucosa and septum. NECK: Supple with no cervical or supraclavicular lymphadenopathy. Trachea is midline, healing trach stomaLungs : He is CTA  EXTREMITIES: Atraumatic. Full range of motion on both upper and lower extremities, there is no tenderness to palpation, no pedal edema, no cyanosis or clubbing, no calf tenderness, normal cap refill, no joint swelling.  SKIN: Warm and dry, no erythema noted, abdomen with g tube  NEUROLOGICAL: The patient is oriented to person, TBI    Social Distancing with PPE practiced due to Covid 19    LABS:    Lab Results   Component Value Date    WBC 10.6 12/08/2020    HGB 13.7 (L) 12/08/2020    HCT 43.8 12/08/2020    MCV 94 12/08/2020     12/08/2020       Results for orders placed or performed in visit on 12/08/20   Basic Metabolic Panel   Result Value Ref Range    Sodium 149 (H) 136 - 145 mmol/L    Potassium 3.8 3.5 - 5.0 mmol/L    Chloride 112 (H) 98 - 107 mmol/L    CO2 25 22 - 31 mmol/L    Anion Gap, Calculation 12 5 - 18 mmol/L    Glucose 137 (H) 70 - 125 mg/dL    Calcium 8.8 8.5 - 10.5 mg/dL    BUN 23 (H) 8 - 22 mg/dL    Creatinine 0.74 0.70 - 1.30 mg/dL    GFR MDRD Af Amer >60 >60 mL/min/1.73m2    GFR MDRD Non Af Amer >60 >60 mL/min/1.73m2           No results found for: HGBA1C  No results found for: [id number]  No results found for: [id number]    ASSESSMENT/PLAN:  1. Hypoxic brain injury (H)    2. Sedated        1.. Hypoxic Brain injury: Scot is in a semi vegative state, med changes made in acute care and  is less aggressive now. Total dependance on staff for all ADL and med/tube management.  Pallative consult with Rene in place. Rounding MD did decrease his Depakote but remains calm  and minimally responsive.    2. Sedation: More awake today, due to TBI no real communication, has aphasia and dysphagia. However recent Depakote level and he has had several behaviors and now is less sedated w/o reported behaviors.     Electronically signed by:  Fouzia High CNP  This progress note was completed using Dragon software and there may be grammatical errors.

## 2021-06-13 NOTE — PROGRESS NOTES
[facility]      Patient: Scot Bishop  MRN: [id number]  Date of Service: 12/3/2020      [facility] [[id number]]  Reason for Visit     Chief Complaint   Patient presents with     Problem Visit   abnormal labs/ fever    Code Status     dnr /comfort foccused    Assessment     - acute hypernatremia na worse on recheck at 152 from 149 on last check  - fever with temp  101.2 in TCU  - acute hyperkalemia with K of 5.6 in tcu today ; improved  - hypercalcemia with ca of 10.8, improved  - elevated BUN of 39 with worsening  - profound leukocytosis with 17k  - unstable vitals  - excessive sedation and pt noted to be sleeping and poorly arousable  - =s/p anoxic brain injury   - Dysphagia on TF  -- long hospitalization with agitated /aggressive behavoir      Plan     Patient has been readmitted to the TCU  He was readmitted in the hospital and had an extensive work-up and stay with both neurology and psychiatry along with palliative care involved in his care.  He was seen as follow-up for his mood and behaviors as well as highly abnormal labs.  Recheck BMP actually shows worsening with a sodium of 152.  He has been given increase water flushes with 250 mL being given 3 times daily in addition to tube feeding with no improvement noted.  Suspect is getting dehydrated versus medication effect.  In addition he continues to run a temp so far work-up in the TCU including a clean-catch UA UC as well as additional labs and work-up has been negative.  He does not have any pressure injuries either.  Suspect is getting septic with high-grade temperature noted patient is febrile and tachypneic and noted to be quite unstable with vitals.  In light of his family's especially his father's earlier wishes of DNR with do not hospitalize palliative care from [facility] was consulted and they saw the patient.  Labs reviewed with them and it was discussed and it was felt that he could be kept comfortable  in the TCU and on hospice cares if family agreed.  Family was called and they needed some extra time to think about it.  Subsequently I and the  again call the family at which time his care plan was reviewed with his father.  His father at present is not leaning towards hospice and requested that his son be transferred to [facility].  We have reviewed his care plan and his overall poor prognosis.  This has been done in the hospital to.  They also feel that he is being overly medicated with psych meds.  Family is also debating whether they would like to take him home.  They want to provide him cares in their own home.  I think that should be possible however his father did call to ask if he was managing steps.  sw  To assist with this transition.  He will be sent to the emergency room stat.  Total time spent is 45 minutes with more than 35min in this visit spent in c/c including calling the family twice including his POA who is his father along with reviewing care plan with nursing as well as with palliative np from the hospital who came to see him.  At this point we will be resending his prior CODE STATUS of comfort focused treatments and sending him back to the hospital as per his POA who is his father's request  Care plan discussed with sw also  Pression with speech therapy since he was getting trials of eating earlier but he is nowhere close to that    History     Patient is a very pleasant 40 y.o. male who is readmitted to TCU  Pt  Is admitted to behavioral health unit due to aggressive behaviors; with hitting staff and difficulty to care  Various interventions were tried and were unsuccessful and eventually patient was started on psychotropic meds.  Eventually his behaviors escalated and he had been sent to the behavioral health unit he comes back on high doses of medication including Seroquel trazodone.  He is also on Depakote 4 times a day.  Continued.  His recheck liver functions as ordered  from the hospital however are normal.  He has bromocriptine has been discontinued and several of his other meds have been discontinued  He is noted to be sleeping excessively and quite sedated.  Family has been calling concerned that he may be overly sedated.  He was noted to be somewhat on the dehydrated side with elevated labs and he was given higher flushes of water unfortunately recheck BMP remains abnormal with a very elevated sodium of 152.  He continues with a fever his temp is 101 in the TCU consistently this morning with no improvement noted.  So far work-up has been negative including a UA UC.  Suspected that he may be aspirating or having a sepsis event.      Past Medical History     Active Ambulatory (Non-Hospital) Problems    Diagnosis     Aggression     Agitation     Low BP     G tube feedings (H)     Yeast infection     Impetigo any site     Hypoxic brain damage (H)     Acute respiratory failure with hypoxia (H)     Gastrojejunostomy tube status (H)     Hypoxic brain injury (H)     Dysphagia     Cardiac arrest (H)     Primary Lymphadenitis     Nicotine Dependence     Backache     Past Medical History:   Diagnosis Date     Acute respiratory failure with hypoxia (H)      Aggression 11/09/2020     Agitation 09/21/2020     LOWELL (acute kidney injury) (H)      Back injury, sequela 10/27/2017     Cardiac arrest (H)      Cardiac arrest (H) 05/17/2020     Cognitive disorder 11/11/2020     Dysphagia      Dysphagia 11/11/2020     Gastrojejunostomy tube status (H) 11/11/2020     HTN (hypertension)      Hypoxic ischemic encephalopathy      Low blood pressure 09/17/2020     Nonverbal 11/11/2020     NSTEMI (non-ST elevated myocardial infarction) (H)      TBI (traumatic brain injury) (H) 06/25/2020       Past Social History     Reviewed, and he  reports that he has been smoking. He has been smoking about 1.00 pack per day. He has never used smokeless tobacco. He reports current alcohol use. He reports that he does not  use drugs.    Family History     Reviewed, and family history includes Cancer in his maternal grandfather; Diabetes in his maternal aunt; Diabetes type I in his maternal aunt; Lung cancer in his maternal grandfather; Other in his father.    Medication List   Post Discharge Medication Reconciliation Status:    QUEtiapine (SEROQUEL) 50 mg tablet    Indications: Aggression Administer 1 tablet via G-tube 3 times daily.   0 11/27/2020     haloperidoL (HALDOL) 5 mg tablet    Indications: Aggression Administer 1 tablet via G-tube 3 times daily.   0 11/27/2020     divalproex sprinkles (DEPAKOTE SPRINKLES) 125 mg capsule    Indications: Aggression 4 capsules 2 times daily. G tube   0 11/27/2020     traZODone (DESYREL) 50 mg tablet    Indications: Aggression Administer 1 tablet via G-tube at bedtime.   0 11/27/2020     potassium chloride (K-APOLLO) 20 mEq packet    Indications: Dehydration 1 Packet once daily with a meal. G-tube   0 11/27/2020     omeprazole (PRILOSEC) 2 mg/mL susp oral suspension    Indications: Dysphagia, unspecified type Administer 20 mL via G-tube once daily.   0 11/27/2020     metoprolol tartrate (LOPRESSOR) 25 mg tablet    Indications: Cardiac arrest (HC) Administer 0.25 tablets via G-tube 2 times daily.   0 11/27/2020     acetaminophen (TYLENOL) 325 mg tablet    Indications: History of anoxic brain injury Administer 2 tablets via G-tube every 6 hours if needed. Max acetaminophen dose: 4000mg in 24 hrs.    0 11/27/2020     ticagrelor (BRILINTA) 90 mg tablet    Indications: Cardiac arrest (HC) Administer 1 tablet via nasogastric tube 2 times daily.   0 11/27/2020     PARoxetine (PAXIL) 20 mg tablet    Indications: Aggression Administer 1 tablet via G-tube once daily.   0 11/27/2020     atorvastatin (LIPITOR) 40 mg tablet    Indications: Cardiac arrest (HC) Administer 1 tablet via G-tube at bedtime.   0 11/27/2020     aspirin chewable 81 mg chewable tablet    Indications: Cardiac arrest (HC) Administer 1  tablet via G-tube once daily.   0 11/27/2020     amantadine HCL (SYMMETREL) 50 mg/5 mL solution    Indications: History of anoxic brain injury Administer 10 mL via G-tube every 12              Allergies     No Known Allergies    Review of Systems   A comprehensive review of 14 systems was done. Pertinent findings noted here and in history of present illness. All the rest negative.  Constitutional: Negative.  Negative for fever, chills, he has activity change, appetite change and fatigue.   HENT: Negative for congestion and facial swelling.    Eyes: Negative for photophobia, redness and visual disturbance.   Respiratory: Negative for cough and chest tightness.    Cardiovascular: Negative for chest pain, palpitations and leg swelling.   Gastrointestinal: Negative for nausea, diarrhea, constipation, blood in stool and abdominal distention.   Has incontinence  Noted to be scratching his buttocks and has rectal / perianal bleeding  Genitourinary: Negative.    Musculoskeletal: Negative.  Remains WC bound  Skin: Negative.    Neurological: Negative for dizziness, tremors, syncope, weakness, light-headedness and headaches.   Hematological: Does not bruise/bleed easily.   Psychiatric/Behavioral: Negative.  Sedated and sleepy      Physical Exam     Recent Vitals 12/1/2020   Weight 166 lbs   BP 98/72   Pulse 100   Temp 97.1   Temp src -   SpO2 -   Some recent data might be hidden   t 101  p133 bp 102/65    Constitutional: Oriented to none and appears well-developed.   HEENT:  Normocephalic and atraumatic.  Eyes: Conjunctivae and EOM are normal. Pupils are equal, round, and reactive to light. No discharge.  No scleral icterus. Nose normal. Mouth/Throat: Oropharynx is clear and moist. No oropharyngeal exudate.    NECK: Normal range of motion. Neck supple. No JVD present. No tracheal deviation present. No thyromegaly present.   CARDIOVASCULAR: Normal rate, regular rhythm and intact distal pulses.  Exam reveals no gallop and no  friction rub.  Systolic murmur present.  PULMONARY: Effort normal and breath sounds normal. No respiratory distress.No Wheezing or rales.  ABDOMEN: Soft. Bowel sounds are normal. No distension and no mass.  There is no tenderness. There is no rebound and no guarding. No HSM.  Peg tube present  Perianal skin intact ; slight excoriation  MUSCULOSKELETAL: Normal range of motion. No edema and no tenderness. Mild kyphosis, no tenderness.  LYMPH NODES: Has no cervical, supraclavicular, axillary and groin adenopathy.   NEUROLOGICAL: Alert and oriented to none. No cranial nerve deficit.  Normal muscle tone. Coordination normal.   GENITOURINARY: Deferred exam.  SKIN: Skin is warm and dry. No rash noted. No erythema. No pallor.   EXTREMITIES: No cyanosis, no clubbing, no edema. No Deformity.  PSYCHIATRIC: Normal mood, affect and behavior.sleepy and poorly responsive      Lab Results     Recent Results (from the past 240 hour(s))   Basic Metabolic Panel   Result Value Ref Range    Sodium 149 (H) 136 - 145 mmol/L    Potassium 5.6 (H) 3.5 - 5.0 mmol/L    Chloride 111 (H) 98 - 107 mmol/L    CO2 14 (L) 22 - 31 mmol/L    Anion Gap, Calculation 24 (H) 5 - 18 mmol/L    Glucose 101 70 - 125 mg/dL    Calcium 10.8 (H) 8.5 - 10.5 mg/dL    BUN 38 (H) 8 - 22 mg/dL    Creatinine 1.11 0.70 - 1.30 mg/dL    GFR MDRD Af Amer >60 >60 mL/min/1.73m2    GFR MDRD Non Af Amer >60 >60 mL/min/1.73m2   Magnesium   Result Value Ref Range    Magnesium 2.3 1.8 - 2.6 mg/dL   Hepatic Profile   Result Value Ref Range    Bilirubin, Total 0.8 0.0 - 1.0 mg/dL    Bilirubin, Direct 0.2 <=0.5 mg/dL    Protein, Total 9.3 (H) 6.0 - 8.0 g/dL    Albumin 4.4 3.5 - 5.0 g/dL    Alkaline Phosphatase 104 45 - 120 U/L    AST 30 0 - 40 U/L    ALT 25 0 - 45 U/L   HM1 (CBC with Diff)   Result Value Ref Range    WBC 17.0 (H) 4.0 - 11.0 thou/uL    RBC 5.47 4.40 - 6.20 mill/uL    Hemoglobin 15.9 14.0 - 18.0 g/dL    Hematocrit 52.2 40.0 - 54.0 %    MCV 95 80 - 100 fL    MCH 29.1  27.0 - 34.0 pg    MCHC 30.5 (L) 32.0 - 36.0 g/dL    RDW 15.5 (H) 11.0 - 14.5 %    Platelets 346 140 - 440 thou/uL    MPV 12.0 8.5 - 12.5 fL    Neutrophils % 86 (H) 50 - 70 %    Lymphocytes % 7 (L) 20 - 40 %    Monocytes % 6 2 - 10 %    Eosinophils % 0 0 - 6 %    Basophils % 0 0 - 2 %    Immature Granulocyte % 1 (H) <=0 %    Neutrophils Absolute 14.6 (H) 2.0 - 7.7 thou/uL    Lymphocytes Absolute 1.2 0.8 - 4.4 thou/uL    Monocytes Absolute 1.0 (H) 0.0 - 0.9 thou/uL    Eosinophils Absolute 0.0 0.0 - 0.4 thou/uL    Basophils Absolute 0.1 0.0 - 0.2 thou/uL    Immature Granulocyte Absolute 0.2 (H) <=0.0 thou/uL   Urinalysis   Result Value Ref Range    Color, UA Orange (!) Colorless, Yellow, Straw, Light Yellow    Clarity, UA Turbid (!) Clear    Glucose, UA Negative Negative    Bilirubin, UA Negative Negative    Ketones, UA Negative Negative    Specific Gravity, UA 1.034 (H) 1.001 - 1.030    Blood, UA Negative Negative    pH, UA 6.0 4.5 - 8.0    Protein, UA Trace (!) Negative mg/dL    Urobilinogen, UA <2.0 E.U./dL <2.0 E.U./dL, 2.0 E.U./dL    Nitrite, UA Negative Negative    Leukocytes, UA Negative Negative    Bacteria, UA None Seen None Seen hpf    RBC, UA 0-2 None Seen, 0-2 hpf    WBC, UA 0-5 None Seen, 0-5 hpf    Squam Epithel, UA 0-5 None Seen, 0-5 lpf    Amorphous, UA Many (!) None Seen   Basic Metabolic Panel   Result Value Ref Range    Sodium 152 (HH) 136 - 145 mmol/L    Potassium 3.9 3.5 - 5.0 mmol/L    Chloride 114 (H) 98 - 107 mmol/L    CO2 27 22 - 31 mmol/L    Anion Gap, Calculation 11 5 - 18 mmol/L    Glucose 135 (H) 70 - 125 mg/dL    Calcium 9.8 8.5 - 10.5 mg/dL    BUN 39 (H) 8 - 22 mg/dL    Creatinine 0.91 0.70 - 1.30 mg/dL    GFR MDRD Af Amer >60 >60 mL/min/1.73m2    GFR MDRD Non Af Amer >60 >60 mL/min/1.73m2               DARIO Vargas

## 2021-06-13 NOTE — TELEPHONE ENCOUNTER
[facility] Nurse Triage Telephone Note      Provider: Cortney Bahena MD  Facility: [facility] Type: TCU    Caller: Gerda  Call Back Number:  [phone]    Allergies: Patient has no known allergies.    Reason for call: Nurse calling to report Heme 1, BMP, Mg, and hepatic profile results.  Of note, patient is NPO and receives tube feedings.  Current water flush orders are 85cc before and after feeding and 100mls three times a day.  Notable meds:  ASA 81mg daily, Metoprolol 6.25mg two times a day, potassium 20meq daily, Brillinta 90mg two times a day, Lipitor 40mg Q HS.       Verbal Order/Direction given by Provider: Discontinue potassium.  Obtain a straight cath UA/UC due to elevated WBC.  Increase water flush to 250mls three times a day.  Make sure HOB is elevated.  Check BMP on 12/3/20.      Provider giving order: Cortney Bahena MD    Verbal order given to: Gerda Ortiz RN

## 2021-06-13 NOTE — TELEPHONE ENCOUNTER
[facility] Nurse Triage Telephone Note      Provider: Cortney Bahena MD  Facility: [facility]    Facility Type: TCU    Caller: Molly   Call Back Number:  [phone]    Allergies: Patient has no known allergies.    Reason for call: CMP-  last 144, AST 45 last 42 ALT 72 last 104   pt is a tube feeder and NPO    Verbal Order/Direction given by Provider: SHERINE 12/23    Provider giving order: Cortney Bahena MD    Verbal order given to: Molly Nielsen RN

## 2021-06-13 NOTE — PROGRESS NOTES
[facility] Admission note      Patient: Scot Bishop  MRN: [id number]  Date of Service: 12/1/2020      [facility] [[id number]]  Reason for Visit     Chief Complaint   Patient presents with     H & P       Code Status     dnr /comfort foccused    Assessment     - acute hypernatremia na 149   - acute hyperkalemia with K of 5.6 in tcu today  - hypercalcemia with ca of 10.8  - elevated BUN of 38  - profound leukocytosis with 17k  - fever with temp  100  - unstable vitals  - excessive sedation and pt noted to be sleeping and poorly arousable  - =s/p anoxic brain injury   - Dysphagia on TF  -- long hospitalization with agitated /aggressive behavoir      Plan     Patient has been readmitted to the TCU  He was readmitted in the hospital and had an extensive work-up and stay with both neurology and psychiatry along with palliative care involved in his care.  He has persistent hypoxic brain injury  Unfortunately no improvement and neurology was involved in his care and felt he was in a permanent vegetative state.  Palliative care was involved and after much discussion the family finally agreed to change his CODE STATUS from full code to DNR.  He will require long-term placement in a group home  In the meantime he has been discharged back to the TCU awaiting placement.  He has noted to be dysphagic and remains on tube feeding.  Recheck labs were reviewed and he has persistent electrolyte abnormalities with elevated sodium as well as calcium noted.  These are critically high.  Suspect this is because of dehydration as he is not eating and drinking.  His tube feeding regimen was reviewed.  His water flushes will be increased to 250 mL 4 times a day.  Recheck BMP closely.  Monitor electrolyte status closely.  Discontinue potassium supplementation due to hyperkalemia.  Perianal and sacral skin examined because of bleeding concerns and no open area noted no hemorrhoids.  Suspect it is irritation  from incontinence and nursing requested to put barrier ointment  Monitor for any skin breakdown.  Also check a UA UC due to persistent leukocytosis.  Check Covid status because of recent hospitalization.  Blood pressures remain low and he is no longer on any antihypertensives  He does have baseline autonomic insufficiency and can have unstable vitals from that too.  Will monitor closely.  His psychiatric meds were adjusted in the hospital because of increasing aggression  He was refusing cares but however now he appears to be quite sedated.  Depakote level was checked in the hospital prior to discharge.  Since he has just returned from the hospital we will monitor trends but in the meantime if he continues to be sedated nursing advised to cut down his Depakote to 3 times a day will dosing instead of 4 times a day  If he deteriorates would strongly encourage family to consider him comfortable and consider not rehospitalizing him.  We can switch him to hospice cares.  Continue with his fall precautions  Care conference done with his family we have agreed to not hospitalized.  We will asked palliative care to follow him in the TCU and keep him conservatively treated    History     Patient is a very pleasant 40 y.o. male who is readmitted to TCU  Pt  Is admitted to behavioral health unit due to aggressive behaviors; with hitting staff and difficulty to care  Various interventions were tried and were unsuccessful and eventually patient was started on psychotropic meds.  Eventually his behaviors escalated and he had been sent to the behavioral health unit he comes back on high doses of medication including Seroquel trazodone.  He is also on Depakote 4 times a day.  Continued.  His recheck liver functions as ordered from the hospital however are normal.  He has bromocriptine has been discontinued and several of his other meds have been discontinued  He is noted to be sleeping excessively and quite sedated.  Staff noted  that he was squirming and trying to fall earlier in the day but now he has been sleeping all day long.  He has  abnormal labs today.  He has electrolyte abnormalities consistent mostly with dehydration  Anoxic brain damage with no improvement in cognitive status.  He had remained a full CODE STATUS in the TCU.  However palliative care was involved in discussion done with family members by neurology he is in a permanent vegetative or minimally conscious state.  It is highly unlikely that he will regain loss cortical functions.  Family finally did agree to make him DNR  He has a temp of 100 today .  have leukocytosis of 17 K    Past Medical History     Active Ambulatory (Non-Hospital) Problems    Diagnosis     Aggression     Agitation     Low BP     G tube feedings (H)     Yeast infection     Impetigo any site     Hypoxic brain damage (H)     Acute respiratory failure with hypoxia (H)     Gastrojejunostomy tube status (H)     Hypoxic brain injury (H)     Dysphagia     Cardiac arrest (H)     Primary Lymphadenitis     Nicotine Dependence     Backache     Past Medical History:   Diagnosis Date     Acute respiratory failure with hypoxia (H)      Aggression 11/09/2020     Agitation 09/21/2020     LOWELL (acute kidney injury) (H)      Back injury, sequela 10/27/2017     Cardiac arrest (H)      Cardiac arrest (H) 05/17/2020     Cognitive disorder 11/11/2020     Dysphagia      Dysphagia 11/11/2020     Gastrojejunostomy tube status (H) 11/11/2020     HTN (hypertension)      Hypoxic ischemic encephalopathy      Low blood pressure 09/17/2020     Nonverbal 11/11/2020     NSTEMI (non-ST elevated myocardial infarction) (H)      TBI (traumatic brain injury) (H) 06/25/2020       Past Social History     Reviewed, and he  reports that he has been smoking. He has been smoking about 1.00 pack per day. He has never used smokeless tobacco. He reports current alcohol use. He reports that he does not use drugs.    Family History     Reviewed,  and family history includes Cancer in his maternal grandfather; Diabetes in his maternal aunt; Diabetes type I in his maternal aunt; Lung cancer in his maternal grandfather; Other in his father.    Medication List   Post Discharge Medication Reconciliation Status:   QUEtiapine (SEROQUEL) 50 mg tablet    Indications: Aggression Administer 1 tablet via G-tube 3 times daily.   0 11/27/2020     haloperidoL (HALDOL) 5 mg tablet    Indications: Aggression Administer 1 tablet via G-tube 3 times daily.   0 11/27/2020     divalproex sprinkles (DEPAKOTE SPRINKLES) 125 mg capsule    Indications: Aggression 4 capsules 2 times daily. G tube   0 11/27/2020     traZODone (DESYREL) 50 mg tablet    Indications: Aggression Administer 1 tablet via G-tube at bedtime.   0 11/27/2020     potassium chloride (K-APOLLO) 20 mEq packet    Indications: Dehydration 1 Packet once daily with a meal. G-tube   0 11/27/2020     omeprazole (PRILOSEC) 2 mg/mL susp oral suspension    Indications: Dysphagia, unspecified type Administer 20 mL via G-tube once daily.   0 11/27/2020     metoprolol tartrate (LOPRESSOR) 25 mg tablet    Indications: Cardiac arrest (HC) Administer 0.25 tablets via G-tube 2 times daily.   0 11/27/2020     acetaminophen (TYLENOL) 325 mg tablet    Indications: History of anoxic brain injury Administer 2 tablets via G-tube every 6 hours if needed. Max acetaminophen dose: 4000mg in 24 hrs.    0 11/27/2020     ticagrelor (BRILINTA) 90 mg tablet    Indications: Cardiac arrest (HC) Administer 1 tablet via nasogastric tube 2 times daily.   0 11/27/2020     PARoxetine (PAXIL) 20 mg tablet    Indications: Aggression Administer 1 tablet via G-tube once daily.   0 11/27/2020     atorvastatin (LIPITOR) 40 mg tablet    Indications: Cardiac arrest (HC) Administer 1 tablet via G-tube at bedtime.   0 11/27/2020     aspirin chewable 81 mg chewable tablet    Indications: Cardiac arrest (HC) Administer 1 tablet via G-tube once daily.   0 11/27/2020      amantadine HCL (SYMMETREL) 50 mg/5 mL solution    Indications: History of anoxic brain injury Administer 10 mL via G-tube every 12              Allergies     No Known Allergies    Review of Systems   A comprehensive review of 14 systems was done. Pertinent findings noted here and in history of present illness. All the rest negative.  Constitutional: Negative.  Negative for fever, chills, he has activity change, appetite change and fatigue.   HENT: Negative for congestion and facial swelling.    Eyes: Negative for photophobia, redness and visual disturbance.   Respiratory: Negative for cough and chest tightness.    Cardiovascular: Negative for chest pain, palpitations and leg swelling.   Gastrointestinal: Negative for nausea, diarrhea, constipation, blood in stool and abdominal distention.   Has incontinence  Noted to be scratching his buttocks and has rectal / perianal bleeding  Genitourinary: Negative.    Musculoskeletal: Negative.  Remains WC bound  Skin: Negative.    Neurological: Negative for dizziness, tremors, syncope, weakness, light-headedness and headaches.   Hematological: Does not bruise/bleed easily.   Psychiatric/Behavioral: Negative.  Sedated and sleepy      Physical Exam     Recent Vitals 12/1/2020   Weight 166 lbs   BP 98/72   Pulse 100   Temp 97.1   Temp src -   SpO2 -   Some recent data might be hidden   r/c temp 99.9    Constitutional: Oriented to none and appears well-developed.   HEENT:  Normocephalic and atraumatic.  Eyes: Conjunctivae and EOM are normal. Pupils are equal, round, and reactive to light. No discharge.  No scleral icterus. Nose normal. Mouth/Throat: Oropharynx is clear and moist. No oropharyngeal exudate.    NECK: Normal range of motion. Neck supple. No JVD present. No tracheal deviation present. No thyromegaly present.   CARDIOVASCULAR: Normal rate, regular rhythm and intact distal pulses.  Exam reveals no gallop and no friction rub.  Systolic murmur present.  PULMONARY: Effort  normal and breath sounds normal. No respiratory distress.No Wheezing or rales.  ABDOMEN: Soft. Bowel sounds are normal. No distension and no mass.  There is no tenderness. There is no rebound and no guarding. No HSM.  Peg tube present  Perianal skin intact ; slight excoriation  MUSCULOSKELETAL: Normal range of motion. No edema and no tenderness. Mild kyphosis, no tenderness.  LYMPH NODES: Has no cervical, supraclavicular, axillary and groin adenopathy.   NEUROLOGICAL: Alert and oriented to none. No cranial nerve deficit.  Normal muscle tone. Coordination normal.   GENITOURINARY: Deferred exam.  SKIN: Skin is warm and dry. No rash noted. No erythema. No pallor.   EXTREMITIES: No cyanosis, no clubbing, no edema. No Deformity.  PSYCHIATRIC: Normal mood, affect and behavior.sleepy and poorly responsive      Lab Results     Recent Results (from the past 240 hour(s))   Basic Metabolic Panel   Result Value Ref Range    Sodium 149 (H) 136 - 145 mmol/L    Potassium 5.6 (H) 3.5 - 5.0 mmol/L    Chloride 111 (H) 98 - 107 mmol/L    CO2 14 (L) 22 - 31 mmol/L    Anion Gap, Calculation 24 (H) 5 - 18 mmol/L    Glucose 101 70 - 125 mg/dL    Calcium 10.8 (H) 8.5 - 10.5 mg/dL    BUN 38 (H) 8 - 22 mg/dL    Creatinine 1.11 0.70 - 1.30 mg/dL    GFR MDRD Af Amer >60 >60 mL/min/1.73m2    GFR MDRD Non Af Amer >60 >60 mL/min/1.73m2   Magnesium   Result Value Ref Range    Magnesium 2.3 1.8 - 2.6 mg/dL   Hepatic Profile   Result Value Ref Range    Bilirubin, Total 0.8 0.0 - 1.0 mg/dL    Bilirubin, Direct 0.2 <=0.5 mg/dL    Protein, Total 9.3 (H) 6.0 - 8.0 g/dL    Albumin 4.4 3.5 - 5.0 g/dL    Alkaline Phosphatase 104 45 - 120 U/L    AST 30 0 - 40 U/L    ALT 25 0 - 45 U/L   HM1 (CBC with Diff)   Result Value Ref Range    WBC 17.0 (H) 4.0 - 11.0 thou/uL    RBC 5.47 4.40 - 6.20 mill/uL    Hemoglobin 15.9 14.0 - 18.0 g/dL    Hematocrit 52.2 40.0 - 54.0 %    MCV 95 80 - 100 fL    MCH 29.1 27.0 - 34.0 pg    MCHC 30.5 (L) 32.0 - 36.0 g/dL    RDW 15.5  (H) 11.0 - 14.5 %    Platelets 346 140 - 440 thou/uL    MPV 12.0 8.5 - 12.5 fL    Neutrophils % 86 (H) 50 - 70 %    Lymphocytes % 7 (L) 20 - 40 %    Monocytes % 6 2 - 10 %    Eosinophils % 0 0 - 6 %    Basophils % 0 0 - 2 %    Immature Granulocyte % 1 (H) <=0 %    Neutrophils Absolute 14.6 (H) 2.0 - 7.7 thou/uL    Lymphocytes Absolute 1.2 0.8 - 4.4 thou/uL    Monocytes Absolute 1.0 (H) 0.0 - 0.9 thou/uL    Eosinophils Absolute 0.0 0.0 - 0.4 thou/uL    Basophils Absolute 0.1 0.0 - 0.2 thou/uL    Immature Granulocyte Absolute 0.2 (H) <=0.0 thou/uL           Imaging Results     Xr Chest 1 View Portable    Result Date: 11/8/2020  EXAM: XR CHEST 1 VIEW PORTABLE LOCATION: [facility] DATE/TIME: 11/8/2020 5:46 PM INDICATION: possible aspiration. Cough. COMPARISON: None.     Negative chest.    Xr Abdomen Ap Portable    Result Date: 11/8/2020  EXAM: XR ABDOMEN AP PORTABLE LOCATION: [facility] DATE/TIME: 11/8/2020 5:30 PM INDICATION: eval jtube COMPARISON: None.     Gastrostomy tube with contrast instilled in the distal stomach and seen extending down the descending duodenum.             DARIO Vargas

## 2021-06-13 NOTE — TELEPHONE ENCOUNTER
[facility] Nurse Triage Telephone Note      Provider: Cortney Bahena MD  Facility: [facility] Type: TCU    Caller: Farzaneh  Call Back Number:  [phone]    Allergies: Patient has no known allergies.    Reason for call: Nurse calling to report CMP results.  Patient gets 375mls of water before and after tube feedings four times a day.  Notable meds:  Depakote 375mg two times a day, Tylenol 650mg four times a day and Q 12 hours PRN, ASA 81mg daily, Brillinta 90mg two times a day.       Verbal Order/Direction given by Provider: Check CMP on 12/17/20.      Provider giving order: Cortney Bahena MD    Verbal order given to: Farzaneh Ortiz RN

## 2021-06-14 DIAGNOSIS — Z53.9 DIAGNOSIS NOT YET DEFINED: Primary | ICD-10-CM

## 2021-06-14 NOTE — PROGRESS NOTES
[facility]      NAME:  Scot Bishop             :  1980    MRN: [id number]    CODE STATUS: DNR    FACILITY: [facility] [[id number]]       CHIEF COMPLAIN/REASON FOR VISIT:  Chief Complaint   Patient presents with     Review Of Multiple Medical Conditions     brain injury       HISTORY OF PRESENT ILLNESS: Scot Bishop is a 40 y.o. male being seen per today for review of multiple medival conditions. . WNL mentation this am, brain injury chronic non responsive with some eye tracking and unpurposeful movment.  He is back from the hospital after an extensive 3-week stay, he had aggressive behaviors here on the TCU and was sent out and was followed by psychiatric care at [facility]. We have been monitoring his LOC and has been sleepy, has TBI due to hypoxia.   He is a TBI and seen in his room this a.m. nonverbal he has a history of aphasia very calm poor eye contact.  [facility] [place] [facility] in May 2020. At that juncture he was in cardiac cath lab and suffered a hypoxic brain injury, high temp and thrombotic event. He is seen in his room today. Non verbal and did not track with his eyes. He will make eye contact. H. He will need ongoing rehab services with PT/OT/ST. Has Gtube due to his dysphagia,  Unfortunealy due to Scot Hypoxic brain injury he will not be able to understand hygiene education and nursing will need to meet these needs for pt. Therapy continues to work with him, seen ambualting with SBA two.Staff to anticipate needs and provide good elizabeth care two times a day and prn.   continues to work with family towards DC to a group home after TCU stay as patient will be unable to care for self, remains dependant on staff for all ADL and mobility as well as GTube for feedings, he also has an order for puréed diet level 4 with thick nectar liquids.  However at this juncture we will look at the tube feeding this is more  pleasure feedings and if he gets aggressive he could aspirate. Scot is very awake today, has had decrease in his Depakote recently reduced and he is much more awake  Today with minimal eye contact, up in a broda chair for positioning and he will continue to need 24 hour nursing care.      . No Known Allergies:     Current Outpatient Medications   Medication Sig     acetaminophen (TYLENOL) 650 mg/20.3 mL Soln 650 mg every 6 (six) hours as needed. Via PEG-tube      amantadine HCL (SYMMETREL) 50 mg/5 mL solution 100 mg every 12 (twelve) hours. Via PEG-tube     aspirin 81 MG EC tablet 81 mg daily. Via PEG-tube     atorvastatin (LIPITOR) 40 MG tablet 40 mg at bedtime. Via PEG-tube     haloperidoL (HALDOL) 5 MG tablet 5 mg by G-tube route 3 (three) times a day.     lisinopriL (PRINIVIL,ZESTRIL) 2.5 MG tablet 1.25 mg daily. Via PEG-tube, hold if SBP <90     LORazepam (ATIVAN) 0.5 MG tablet 1 tablet (0.5 mg total) by G-tube route 4 (four) times a day for 3 days.     metoprolol tartrate (LOPRESSOR) 25 MG tablet 6.25 mg 2 (two) times a day. Via PEG-tube, hold if SBP <90, HR <60     neomycin-bacitracin-polymyxin B (NEOSPORIN) 3.5-400-5,000 mg-unit-unit OiPk ointment Apply 1 application topically 2 (two) times a day.     omeprazole (PRILOSEC) 2 mg/mL SusR suspension 40 mg daily before breakfast. Via PEG-tube     PARoxetine (PAXIL) 20 MG tablet 20 mg by G-tube route every morning.     QUEtiapine (SEROQUEL) 25 MG tablet 50 mg 3 (three) times a day. Via PEG-tube give 25 mg am/pm with 12.5 mg mid day due to increased agitation     ticagrelor (BRILINTA) 90 mg Tab 90 mg 2 (two) times a day. Via PEG-tube     traZODone (DESYREL) 50 MG tablet 50 mg at bedtime. Via PEG-tube     valproate (DEPAKENE) 250 mg/5 mL 250 mg by G-tube route 2 (two) times a day.     white petrolatum (AQUAPHOR ORIGINAL) 41 % Oint Apply 1 application topically 2 (two) times a day. Apply to feet/legs         REVIEW OF SYSTEMS:  Unable to verbalize due to  aphasia    PHYSICAL EXAMINATION:  Vitals:    12/31/20 1558   BP: 112/66   Pulse: 100   Temp: 98.5  F (36.9  C)   Weight: 154 lb 9.6 oz (70.1 kg)         GENERAL: Does not follow simple commands, makes eye contact, non verbal.  HEENT: Head is normocephalic with normal hair distribution. No evidence of trauma.Oral: reddened wth white coating on tongue left inner cheek Ears: No acute purulent discharge. Eyes: Conjunctivae pink with no scleral jaundice. Nose: Normal mucosa and septum. NECK: Supple with no cervical or supraclavicular lymphadenopathy. Trachea is midline, healing trach stomaLungs : He is CTA  EXTREMITIES: Atraumatic. Full range of motion on both upper and lower extremities, there is no tenderness to palpation, no pedal edema, no cyanosis or clubbing, no calf tenderness, normal cap refill, no joint swelling.  SKIN: Warm and dry, no erythema noted, abdomen with g tube  NEUROLOGICAL: The patient is oriented to person, TBI    Social Distancing with PPE practiced due to Covid 19    LABS:    Lab Results   Component Value Date    WBC 10.6 12/08/2020    HGB 13.7 (L) 12/08/2020    HCT 43.8 12/08/2020    MCV 94 12/08/2020     12/08/2020       Results for orders placed or performed in visit on 12/24/20   Basic Metabolic Panel   Result Value Ref Range    Sodium 138 136 - 145 mmol/L    Potassium 4.0 3.5 - 5.0 mmol/L    Chloride 102 98 - 107 mmol/L    CO2 23 22 - 31 mmol/L    Anion Gap, Calculation 13 5 - 18 mmol/L    Glucose 126 (H) 70 - 125 mg/dL    Calcium 9.6 8.5 - 10.5 mg/dL    BUN 17 8 - 22 mg/dL    Creatinine 0.75 0.70 - 1.30 mg/dL    GFR MDRD Af Amer >60 >60 mL/min/1.73m2    GFR MDRD Non Af Amer >60 >60 mL/min/1.73m2           No results found for: HGBA1C  No results found for: [id number]  No results found for: IABEJLMH61    ASSESSMENT/PLAN:  1. Acute respiratory failure with hypoxia (H)    2. Hypoxic brain injury (H)      1. Acute resp failure with hypoxia : NAD, Sats are stable, however due to this  sigrid Ellison has hypoxic brain injury and remains dependant on staff for all cares, tf ing assist for nutrition and med management. He will require support after discontinue and will need the following:  Patient is a 40 year old with a dx of hypoxic brain injury. Patient has mobility limitations significantly impairing his/her ability to participate in mobility-related activities of daily living such as toileting, dressing, grooming, and bathing in customary locations in the home. Mobility limitation cannot be sufficiently resolved by us of an appropriately fitted cane or walker. Caregiver is able to safely use a manual wheelchair. Patient s functional mobility deficit can be sufficiently resolved by the use of a manual wheelchair. He needs an adjustable wheelchair that is able to support his torso and would benefit from Broda wheelchair with a reclining back or the equivalent. He has decreased trunk control and needs to have the ability to have a full range of positioning from vertical to flat. He has significantly impaired ability to reposition due to his hypoxic brain injury. He is incontinent of both bowel and bladder and has a significant risk of pressure ulcers due to his incontinence and inability to self adjust positioning. He utilizes a jessica lift for all transfers and is unable to propel himself in a wheelchair with either his legs or his arms. He has a G-Tube for feeding and needs to remain in an upright position after feeding. According to his neurologist he is in a permanent vegetative or minimally conscious state in a condition that most likely will be permanent and is highly unlikely that he will regain lost cortical functions.   Patient is a 40 year old male with a dx of hypoxic brain injury. Patient requires positioning of the body in ways not feasible with an ordinary bed due to the hypoxic brain injury that is expected to last for his lifetime. Patient requires, for alleviation of pain, positioning  of the body in ways not feasible with an ordinary bed. Patient requires the head of the bed to be elevated more than 30 degrees most of the time due to his use of a feeding tube and aspiration due to dysphagia. Patient requires a bed height different than a fixed height hospital bed to permit transfers to chair, wheelchair, or standing position. Patient requires frequent changes in body position and/or have an immediate need for change in body position related to his hypoxic brain injury and incontinence.   Patient is a 40 year old male with a dx of hypoxic brain injury. Patient has mobility limitations significantly impairing his ability to participate in mobility-related activities of daily living such as toileting, dressing, grooming, and bathing in customary locations in the home. Mobility limitation cannot be sufficiently resolved by us of an appropriately fitted cane or walker or other transfer. Patient s functional mobility deficit can be sufficiently resolved by the use of a South lift to assist with transfers. Without use of the life, Patient would be confined to bed. Staff are  appropriately trained and able to use a South lift safely.      2. Hypoxic Brain injury: Scot is in a semi vegative state, med changes made in acute care and  is less aggressive now. Total dependance on staff for all ADL and med/tube management.  Pallative consult with [facility] in place. Yoan FUNES did decrease his Depakote but remains calm and minimally responsive. Further dose reductions not recommended at this time due to adverse reactions and behaviors he has ad in the past       Electronically signed by:  Fouzia High CNP  This progress note was completed using Dragon software and there may be grammatical errors.

## 2021-06-14 NOTE — PROGRESS NOTES
[facility] Admission note      Patient: Scot Bishop  MRN: [id number]        [facility] [[id number]]  Reason for Visit     Chief Complaint   Patient presents with     Follow Up   Evaluated for review of his psychotropic medications1    Code Status     dnr /comfort foccused    Assessment     - s/p anoxic brain injury   - Dysphagia on TF  -- long hospitalization with agitated /aggressive behavoir  Patient on multiple psychotropic meds.  -Hypotension.  - FTT    Plan     Patient has been readmitted to the TCU  He remains bed/WC bound  Mood and behaviors reviewed with staff.  He continues to have intermittent agitation.  Overall he has been stable with more alertness noted but does not appear to be interacting with his surroundings.  He remains nonverbal  He remains noncommunicative due to his anoxic brain injury  He is incontinent and requires complete assistance with all his ADLs.  Blood pressures and temperatures continue to fluctuate widely due to autonomic insufficiency.  He is currently requiring complete assistance with all his ADLs with no improvement family and staff is looking at group home placement    History     Patient is a very pleasant 40 y.o. male who is readmitted to TCU  Pt  Is admitted to behavioral health unit due to aggressive behaviors; with hitting staff and difficulty to care  Various interventions were tried and were unsuccessful and eventually patient was started on psychotropic meds.  Eventually his behaviors escalated and he had been sent to the behavioral health unit he comes back on high doses of medication including Seroquel trazodone.  Recently Depakote was reduced to 3 times a day with improvement noted.  Today was sitting in a Broda chair.  Since then he has become more alert.  However he continues to have intermittent episodes of agitation.  Staff do not want any discontinuation of his psychotropic medications as they believe that his behaviors can  escalate easily and he can be very difficult to redirect  He is hard to redirect and can get quite combative.  Recently he was sent back to the emergency room after he pulled his G-tube out this was replaced in the emergency room.  Since then he has tolerated his tube feeding well  This was replaced.  He also was noted to have behaviors including picking and scratching at his skin repetitively he is hard to redirect  He was noted to be talking to staff but his interaction at baseline remains poor.  He does not make any eye contact.  He was noted to be scratching and picking at his skin from which he is hard to redirect he has been given various sensory tools to keep him engaged.  At baseline nonambulatory and requires a Broda chair he is an assist of all cares.  In addition he has chronically low blood pressures which are felt to be secondary to his autonomic instability.  Blood pressures continue to fluctuate widely  He continues to require tube feeding.  At present he is not able to self initiate eating.  He also has low blood pressures with intermittent low-grade temperatures noted work-up in the past has been negative including extensive imaging.  It is felt secondary to autonomic instability  Blood pressures and vitals remain labile weights have been stable recently  Past Medical History     Active Ambulatory (Non-Hospital) Problems    Diagnosis     Sedated     Thrush, oral     Fever     Aggression     Agitation     Low BP     G tube feedings (H)     Yeast infection     Impetigo any site     Hypoxic brain damage (H)     Acute respiratory failure with hypoxia (H)     Gastrojejunostomy tube status (H)     Hypoxic brain injury (H)     Dysphagia     Cardiac arrest (H)     Primary Lymphadenitis     Nicotine Dependence     Backache     Past Medical History:   Diagnosis Date     Acute respiratory failure with hypoxia (H)      Aggression 11/09/2020     Agitation 09/21/2020     LOWELL (acute kidney injury) (H)      Back  injury, sequela 10/27/2017     Cardiac arrest (H)      Cardiac arrest (H) 05/17/2020     Cognitive disorder 11/11/2020     Dysphagia      Dysphagia 11/11/2020     Gastrojejunostomy tube status (H) 11/11/2020     HTN (hypertension)      Hypoxic ischemic encephalopathy      Low blood pressure 09/17/2020     Nonverbal 11/11/2020     NSTEMI (non-ST elevated myocardial infarction) (H)      TBI (traumatic brain injury) (H) 06/25/2020       Past Social History     Reviewed, and he  reports that he has been smoking. He has been smoking about 1.00 pack per day. He has never used smokeless tobacco. He reports current alcohol use. He reports that he does not use drugs.    Family History     Reviewed, and family history includes Cancer in his maternal grandfather; Diabetes in his maternal aunt; Diabetes type I in his maternal aunt; Lung cancer in his maternal grandfather; Other in his father.    Medication List   Post Discharge Medication Reconciliation Status:    QUEtiapine (SEROQUEL) 50 mg tablet    Indications: Aggression Administer 1 tablet via G-tube 3 times daily.   0 11/27/2020     haloperidoL (HALDOL) 5 mg tablet    Indications: Aggression Administer 1 tablet via G-tube 3 times daily.   0 11/27/2020     divalproex sprinkles (DEPAKOTE SPRINKLES) 125 mg capsule    Indications: Aggression 4 capsules 2 times daily. G tube   0 11/27/2020     traZODone (DESYREL) 50 mg tablet    Indications: Aggression Administer 1 tablet via G-tube at bedtime.   0 11/27/2020     potassium chloride (K-APOLLO) 20 mEq packet    Indications: Dehydration 1 Packet once daily with a meal. G-tube   0 11/27/2020     omeprazole (PRILOSEC) 2 mg/mL susp oral suspension    Indications: Dysphagia, unspecified type Administer 20 mL via G-tube once daily.   0 11/27/2020     metoprolol tartrate (LOPRESSOR) 25 mg tablet    Indications: Cardiac arrest (HC) Administer 0.25 tablets via G-tube 2 times daily.   0 11/27/2020     acetaminophen (TYLENOL) 325 mg tablet     Indications: History of anoxic brain injury Administer 2 tablets via G-tube every 6 hours if needed. Max acetaminophen dose: 4000mg in 24 hrs.    0 11/27/2020     ticagrelor (BRILINTA) 90 mg tablet    Indications: Cardiac arrest (HC) Administer 1 tablet via nasogastric tube 2 times daily.   0 11/27/2020     PARoxetine (PAXIL) 20 mg tablet    Indications: Aggression Administer 1 tablet via G-tube once daily.   0 11/27/2020     atorvastatin (LIPITOR) 40 mg tablet    Indications: Cardiac arrest (HC) Administer 1 tablet via G-tube at bedtime.   0 11/27/2020     aspirin chewable 81 mg chewable tablet    Indications: Cardiac arrest (HC) Administer 1 tablet via G-tube once daily.   0 11/27/2020     amantadine HCL (SYMMETREL) 50 mg/5 mL solution    Indications: History of anoxic brain injury Administer 10 mL via G-tube every 12              Allergies     No Known Allergies    Review of Systems   A comprehensive review of 14 systems was done. Pertinent findings noted here and in history of present illness. All the rest negative.  Constitutional: Negative.  Negative for fever, chills, he has activity change, appetite change and fatigue.   HENT: Negative for congestion and facial swelling.    Eyes: Negative for photophobia, redness and visual disturbance.   Respiratory: Negative for cough and chest tightness.    Cardiovascular: Negative for chest pain, palpitations and leg swelling.   Gastrointestinal: Negative for nausea, diarrhea, constipation, blood in stool and abdominal distention.   Has incontinence  Genitourinary: Negative.    Musculoskeletal: Negative.  Remains WC bound  Skin: Negative.    Neurological: Negative for dizziness, tremors, syncope, weakness, light-headedness and headaches.   Hematological: Does not bruise/bleed easily.   Psychiatric/Behavioral: Negative.  Noted to be more awake per staff      Physical Exam     Recent Vitals 1/21/2021   Weight 148 lbs 6 oz   /76   Pulse 68   Temp 98   Temp src -    SpO2 95   Some recent data might be hidden       Constitutional: Oriented to none and appears well-developed.   HEENT:  Normocephalic and atraumatic.  Eyes: Conjunctivae and EOM are normal. Pupils are equal, round, and reactive to light. No discharge.  No scleral icterus. Nose normal. Mouth/Throat: Oropharynx is clear and moist. No oropharyngeal exudate.    NECK: Normal range of motion. Neck supple. No JVD present. No tracheal deviation present. No thyromegaly present.   CARDIOVASCULAR: Normal rate, regular rhythm and intact distal pulses.  Exam reveals no gallop and no friction rub.  Systolic murmur present.  PULMONARY: Effort normal and breath sounds normal. No respiratory distress.No Wheezing or rales.  ABDOMEN: Soft. Bowel sounds are normal. No distension and no mass.  There is no tenderness. There is no rebound and no guarding. No HSM.  Peg tube present  Perianal skin intact ; slight excoriation  MUSCULOSKELETAL: Normal range of motion. No edema and no tenderness. Mild kyphosis, no tenderness.  LYMPH NODES: Has no cervical, supraclavicular, axillary and groin adenopathy.   NEUROLOGICAL: Alert and oriented to none. No cranial nerve deficit.  Normal muscle tone. Coordination normal.   aphasic  GENITOURINARY: Deferred exam.  SKIN: Skin is warm and dry. No rash noted. No erythema. No pallor.   EXTREMITIES: No cyanosis, no clubbing, no edema. No Deformity.  PSYCHIATRIC: Normal mood, affect and behavior. poorly responsive but sitting with his eyes open      Lab Results     Recent Results (from the past 240 hour(s))   COVID-19 Virus PCR MRF    Specimen: Respiratory   Result Value Ref Range    COVID-19 VIRUS SPECIMEN SOURCE [facility]     2019-nCOV Not Detected            DARIO Vargas

## 2021-06-14 NOTE — PROGRESS NOTES
[facility] Admission note      Patient: Scot Bishop  MRN: [id number]        [facility] [[id number]]  Reason for Visit     Chief Complaint   Patient presents with     Problem Visit   Evaluated for review of his psychotropic medications1    Code Status     dnr /comfort foccused    Assessment     - =s/p anoxic brain injury   - Dysphagia on TF  -- long hospitalization with agitated /aggressive behavoir  Patient on multiple psychotropic meds.  -Hypotension.  - FTT    Plan     Patient has been readmitted to the TCU  Seen at the request of the  who is requesting medication reduction at the request of family.  Care plan was reviewed with staff also recently Depakote dosage was reduced.  Staff is reporting that he is alert and oriented.  He has underlying history of anoxic brain damage but has been interactive he has been noticed to be sitting in the Broda chair no behaviors reported.  They do not report that is excessively sedated.  Recheck BMP overall shows an improvement  Care plan reviewed and at present we will continue with him the with no changes in his psychotropic medications.  He has had a prolonged and lengthy stay in the hospital because of excessive aggressive and difficult behaviors.  We will reassess again at MY NEXT VISIT      History     Patient is a very pleasant 40 y.o. male who is readmitted to TCU  Pt  Is admitted to behavioral health unit due to aggressive behaviors; with hitting staff and difficulty to care  Various interventions were tried and were unsuccessful and eventually patient was started on psychotropic meds.  Eventually his behaviors escalated and he had been sent to the behavioral health unit he comes back on high doses of medication including Seroquel trazodone.  Recently Depakote was reduced to 3 times a day with improvement noted.  Today was sitting in a Broda chair.  He was noted to be interacting somewhat and watching TV staff has reported  that he is quite conversant also occasionally.  However no worsening behaviors reported  At baseline nonambulatory and requires a Broda chair he is an assist of all cares.  In addition he has chronically low blood pressures which are felt to be secondary to his autonomic instability.  He continues to require tube feeding.  At present he is not able to self initiate eating    Past Medical History     Active Ambulatory (Non-Hospital) Problems    Diagnosis     Sedated     Thrush, oral     Fever     Aggression     Agitation     Low BP     G tube feedings (H)     Yeast infection     Impetigo any site     Hypoxic brain damage (H)     Acute respiratory failure with hypoxia (H)     Gastrojejunostomy tube status (H)     Hypoxic brain injury (H)     Dysphagia     Cardiac arrest (H)     Primary Lymphadenitis     Nicotine Dependence     Backache     Past Medical History:   Diagnosis Date     Acute respiratory failure with hypoxia (H)      Aggression 11/09/2020     Agitation 09/21/2020     LOWELL (acute kidney injury) (H)      Back injury, sequela 10/27/2017     Cardiac arrest (H)      Cardiac arrest (H) 05/17/2020     Cognitive disorder 11/11/2020     Dysphagia      Dysphagia 11/11/2020     Gastrojejunostomy tube status (H) 11/11/2020     HTN (hypertension)      Hypoxic ischemic encephalopathy      Low blood pressure 09/17/2020     Nonverbal 11/11/2020     NSTEMI (non-ST elevated myocardial infarction) (H)      TBI (traumatic brain injury) (H) 06/25/2020       Past Social History     Reviewed, and he  reports that he has been smoking. He has been smoking about 1.00 pack per day. He has never used smokeless tobacco. He reports current alcohol use. He reports that he does not use drugs.    Family History     Reviewed, and family history includes Cancer in his maternal grandfather; Diabetes in his maternal aunt; Diabetes type I in his maternal aunt; Lung cancer in his maternal grandfather; Other in his father.    Medication List    Post Discharge Medication Reconciliation Status:    QUEtiapine (SEROQUEL) 50 mg tablet    Indications: Aggression Administer 1 tablet via G-tube 3 times daily.   0 11/27/2020     haloperidoL (HALDOL) 5 mg tablet    Indications: Aggression Administer 1 tablet via G-tube 3 times daily.   0 11/27/2020     divalproex sprinkles (DEPAKOTE SPRINKLES) 125 mg capsule    Indications: Aggression 4 capsules 2 times daily. G tube   0 11/27/2020     traZODone (DESYREL) 50 mg tablet    Indications: Aggression Administer 1 tablet via G-tube at bedtime.   0 11/27/2020     potassium chloride (K-APOLLO) 20 mEq packet    Indications: Dehydration 1 Packet once daily with a meal. G-tube   0 11/27/2020     omeprazole (PRILOSEC) 2 mg/mL susp oral suspension    Indications: Dysphagia, unspecified type Administer 20 mL via G-tube once daily.   0 11/27/2020     metoprolol tartrate (LOPRESSOR) 25 mg tablet    Indications: Cardiac arrest (HC) Administer 0.25 tablets via G-tube 2 times daily.   0 11/27/2020     acetaminophen (TYLENOL) 325 mg tablet    Indications: History of anoxic brain injury Administer 2 tablets via G-tube every 6 hours if needed. Max acetaminophen dose: 4000mg in 24 hrs.    0 11/27/2020     ticagrelor (BRILINTA) 90 mg tablet    Indications: Cardiac arrest (HC) Administer 1 tablet via nasogastric tube 2 times daily.   0 11/27/2020     PARoxetine (PAXIL) 20 mg tablet    Indications: Aggression Administer 1 tablet via G-tube once daily.   0 11/27/2020     atorvastatin (LIPITOR) 40 mg tablet    Indications: Cardiac arrest (HC) Administer 1 tablet via G-tube at bedtime.   0 11/27/2020     aspirin chewable 81 mg chewable tablet    Indications: Cardiac arrest (HC) Administer 1 tablet via G-tube once daily.   0 11/27/2020     amantadine HCL (SYMMETREL) 50 mg/5 mL solution    Indications: History of anoxic brain injury Administer 10 mL via G-tube every 12              Allergies     No Known Allergies    Review of Systems   A  comprehensive review of 14 systems was done. Pertinent findings noted here and in history of present illness. All the rest negative.  Constitutional: Negative.  Negative for fever, chills, he has activity change, appetite change and fatigue.   HENT: Negative for congestion and facial swelling.    Eyes: Negative for photophobia, redness and visual disturbance.   Respiratory: Negative for cough and chest tightness.    Cardiovascular: Negative for chest pain, palpitations and leg swelling.   Gastrointestinal: Negative for nausea, diarrhea, constipation, blood in stool and abdominal distention.   Has incontinence  Genitourinary: Negative.    Musculoskeletal: Negative.  Remains WC bound  Skin: Negative.    Neurological: Negative for dizziness, tremors, syncope, weakness, light-headedness and headaches.   Hematological: Does not bruise/bleed easily.   Psychiatric/Behavioral: Negative.  Noted to be more awake per staff      Physical Exam     Recent Vitals 12/31/2020   Weight 154 lbs 10 oz   /66   Pulse 100   Temp 98.5   Some recent data might be hidden   t 101  p133 bp 102/65    Constitutional: Oriented to none and appears well-developed.   HEENT:  Normocephalic and atraumatic.  Eyes: Conjunctivae and EOM are normal. Pupils are equal, round, and reactive to light. No discharge.  No scleral icterus. Nose normal. Mouth/Throat: Oropharynx is clear and moist. No oropharyngeal exudate.    NECK: Normal range of motion. Neck supple. No JVD present. No tracheal deviation present. No thyromegaly present.   CARDIOVASCULAR: Normal rate, regular rhythm and intact distal pulses.  Exam reveals no gallop and no friction rub.  Systolic murmur present.  PULMONARY: Effort normal and breath sounds normal. No respiratory distress.No Wheezing or rales.  ABDOMEN: Soft. Bowel sounds are normal. No distension and no mass.  There is no tenderness. There is no rebound and no guarding. No HSM.  Peg tube present  Perianal skin intact ; slight  excoriation  MUSCULOSKELETAL: Normal range of motion. No edema and no tenderness. Mild kyphosis, no tenderness.  LYMPH NODES: Has no cervical, supraclavicular, axillary and groin adenopathy.   NEUROLOGICAL: Alert and oriented to none. No cranial nerve deficit.  Normal muscle tone. Coordination normal.   GENITOURINARY: Deferred exam.  SKIN: Skin is warm and dry. No rash noted. No erythema. No pallor.   EXTREMITIES: No cyanosis, no clubbing, no edema. No Deformity.  PSYCHIATRIC: Normal mood, affect and behavior. poorly responsive but sitting with his eyes open      Lab Results     Recent Results (from the past 240 hour(s))   COVID-19 Virus PCR MRF    Specimen: Respiratory   Result Value Ref Range    COVID-19 VIRUS SPECIMEN SOURCE [facility]     2019-nCOV Not Detected    COVID-19 Virus PCR MRF    Specimen: Respiratory   Result Value Ref Range    COVID-19 VIRUS SPECIMEN SOURCE [facility]     2019-nCOV Not Detected            DARIO Vargas

## 2021-06-14 NOTE — TELEPHONE ENCOUNTER
[facility] Nurse Triage Telephone Note      Provider: PATO Saldana  Facility: [facility]    Facility Type: TCU    Caller: Lizz  Call Back Number:  [phone]    Allergies: Patient has no known allergies.    Reason for call: Nurse reporting that pharmacy is not able to switch patient's Depakote from capsule to liquid.  It's only available in liquid as Valproic acid.  Patient is currently receiving 375mg two times a day.       Verbal Order/Direction given by Provider: Discontinue Depakote sprinkles.  Start Valproic Acid 250mg/5ml and give 250mg two times a day per G-tube.      Provider giving order: PATO Saldana    Verbal order given to: Romulo Ortiz RN

## 2021-06-14 NOTE — PROGRESS NOTES
[facility] Admission note      Patient: Scot Bishop  MRN: [id number]        [facility] [[id number]]  Reason for Visit     Chief Complaint   Patient presents with     Review Of Multiple Medical Conditions   Evaluated for review of his psychotropic medications1    Code Status     dnr /comfort foccused    Assessment     - s/p anoxic brain injury   - Dysphagia on TF  -- long hospitalization with agitated /aggressive behavoir  Patient on multiple psychotropic meds.  -Hypotension.  - FTT    Plan     Patient has been readmitted to the TCU  He remains bed/WC bound  Mood and behaviors were reviewed again and they are concerned because there have seen some return of his previous behaviors including pulling at his G-tube trying to remove it and somewhat aggressive behaviors.  He was noted to have repetitive movement of his arm and is hard to redirect.  Nursing believes that he is trying to scratch himself.  Unfortunately he is not showing any return in cognitive activity.  Staff did report that they did notice that he was trying to talk this morning.  He still does not make any eye contact.  Vitals were reviewed at the request of staff he frequently will have labile blood pressures with low blood pressures and occasional episodes of low-grade temperature.  These are felt to be due to autonomic instability he does not have any localizing symptoms so we will continue to monitor him.  No taper of psych meds at present  Last BMP was normal    History     Patient is a very pleasant 40 y.o. male who is readmitted to TCU  Pt  Is admitted to behavioral health unit due to aggressive behaviors; with hitting staff and difficulty to care  Various interventions were tried and were unsuccessful and eventually patient was started on psychotropic meds.  Eventually his behaviors escalated and he had been sent to the behavioral health unit he comes back on high doses of medication including Seroquel  trazodone.  Recently Depakote was reduced to 3 times a day with improvement noted.  Today was sitting in a Broda chair.  Since then he has become more alert.  He was noted to be talking to staff but his interaction at baseline remains poor.  He does not make any eye contact.  He was noted to be scratching and picking at his skin from which he is hard to redirect he has been given various sensory tools to keep him engaged.  At baseline nonambulatory and requires a Broda chair he is an assist of all cares.  In addition he has chronically low blood pressures which are felt to be secondary to his autonomic instability.  He continues to require tube feeding.  At present he is not able to self initiate eating.  He also has low blood pressures with intermittent low-grade temperatures noted work-up in the past has been negative including extensive imaging.  It is felt secondary to autonomic instability    Past Medical History     Active Ambulatory (Non-Hospital) Problems    Diagnosis     Sedated     Thrush, oral     Fever     Aggression     Agitation     Low BP     G tube feedings (H)     Yeast infection     Impetigo any site     Hypoxic brain damage (H)     Acute respiratory failure with hypoxia (H)     Gastrojejunostomy tube status (H)     Hypoxic brain injury (H)     Dysphagia     Cardiac arrest (H)     Primary Lymphadenitis     Nicotine Dependence     Backache     Past Medical History:   Diagnosis Date     Acute respiratory failure with hypoxia (H)      Aggression 11/09/2020     Agitation 09/21/2020     LOWELL (acute kidney injury) (H)      Back injury, sequela 10/27/2017     Cardiac arrest (H)      Cardiac arrest (H) 05/17/2020     Cognitive disorder 11/11/2020     Dysphagia      Dysphagia 11/11/2020     Gastrojejunostomy tube status (H) 11/11/2020     HTN (hypertension)      Hypoxic ischemic encephalopathy      Low blood pressure 09/17/2020     Nonverbal 11/11/2020     NSTEMI (non-ST elevated myocardial infarction) (H)       TBI (traumatic brain injury) (H) 06/25/2020       Past Social History     Reviewed, and he  reports that he has been smoking. He has been smoking about 1.00 pack per day. He has never used smokeless tobacco. He reports current alcohol use. He reports that he does not use drugs.    Family History     Reviewed, and family history includes Cancer in his maternal grandfather; Diabetes in his maternal aunt; Diabetes type I in his maternal aunt; Lung cancer in his maternal grandfather; Other in his father.    Medication List   Post Discharge Medication Reconciliation Status:    QUEtiapine (SEROQUEL) 50 mg tablet    Indications: Aggression Administer 1 tablet via G-tube 3 times daily.   0 11/27/2020     haloperidoL (HALDOL) 5 mg tablet    Indications: Aggression Administer 1 tablet via G-tube 3 times daily.   0 11/27/2020     divalproex sprinkles (DEPAKOTE SPRINKLES) 125 mg capsule    Indications: Aggression 4 capsules 2 times daily. G tube   0 11/27/2020     traZODone (DESYREL) 50 mg tablet    Indications: Aggression Administer 1 tablet via G-tube at bedtime.   0 11/27/2020     potassium chloride (K-APOLLO) 20 mEq packet    Indications: Dehydration 1 Packet once daily with a meal. G-tube   0 11/27/2020     omeprazole (PRILOSEC) 2 mg/mL susp oral suspension    Indications: Dysphagia, unspecified type Administer 20 mL via G-tube once daily.   0 11/27/2020     metoprolol tartrate (LOPRESSOR) 25 mg tablet    Indications: Cardiac arrest (HC) Administer 0.25 tablets via G-tube 2 times daily.   0 11/27/2020     acetaminophen (TYLENOL) 325 mg tablet    Indications: History of anoxic brain injury Administer 2 tablets via G-tube every 6 hours if needed. Max acetaminophen dose: 4000mg in 24 hrs.    0 11/27/2020     ticagrelor (BRILINTA) 90 mg tablet    Indications: Cardiac arrest (HC) Administer 1 tablet via nasogastric tube 2 times daily.   0 11/27/2020     PARoxetine (PAXIL) 20 mg tablet    Indications: Aggression Administer 1  tablet via G-tube once daily.   0 11/27/2020     atorvastatin (LIPITOR) 40 mg tablet    Indications: Cardiac arrest (HC) Administer 1 tablet via G-tube at bedtime.   0 11/27/2020     aspirin chewable 81 mg chewable tablet    Indications: Cardiac arrest (HC) Administer 1 tablet via G-tube once daily.   0 11/27/2020     amantadine HCL (SYMMETREL) 50 mg/5 mL solution    Indications: History of anoxic brain injury Administer 10 mL via G-tube every 12              Allergies     No Known Allergies    Review of Systems   A comprehensive review of 14 systems was done. Pertinent findings noted here and in history of present illness. All the rest negative.  Constitutional: Negative.  Negative for fever, chills, he has activity change, appetite change and fatigue.   HENT: Negative for congestion and facial swelling.    Eyes: Negative for photophobia, redness and visual disturbance.   Respiratory: Negative for cough and chest tightness.    Cardiovascular: Negative for chest pain, palpitations and leg swelling.   Gastrointestinal: Negative for nausea, diarrhea, constipation, blood in stool and abdominal distention.   Has incontinence  Genitourinary: Negative.    Musculoskeletal: Negative.  Remains WC bound  Skin: Negative.    Neurological: Negative for dizziness, tremors, syncope, weakness, light-headedness and headaches.   Hematological: Does not bruise/bleed easily.   Psychiatric/Behavioral: Negative.  Noted to be more awake per staff      Physical Exam     Recent Vitals 12/31/2020   Weight 154 lbs 10 oz   /66   Pulse 100   Temp 98.5   Some recent data might be hidden   t 101  p133 bp 102/65    Constitutional: Oriented to none and appears well-developed.   HEENT:  Normocephalic and atraumatic.  Eyes: Conjunctivae and EOM are normal. Pupils are equal, round, and reactive to light. No discharge.  No scleral icterus. Nose normal. Mouth/Throat: Oropharynx is clear and moist. No oropharyngeal exudate.    NECK: Normal range of  motion. Neck supple. No JVD present. No tracheal deviation present. No thyromegaly present.   CARDIOVASCULAR: Normal rate, regular rhythm and intact distal pulses.  Exam reveals no gallop and no friction rub.  Systolic murmur present.  PULMONARY: Effort normal and breath sounds normal. No respiratory distress.No Wheezing or rales.  ABDOMEN: Soft. Bowel sounds are normal. No distension and no mass.  There is no tenderness. There is no rebound and no guarding. No HSM.  Peg tube present  Perianal skin intact ; slight excoriation  MUSCULOSKELETAL: Normal range of motion. No edema and no tenderness. Mild kyphosis, no tenderness.  LYMPH NODES: Has no cervical, supraclavicular, axillary and groin adenopathy.   NEUROLOGICAL: Alert and oriented to none. No cranial nerve deficit.  Normal muscle tone. Coordination normal.   aphasic  GENITOURINARY: Deferred exam.  SKIN: Skin is warm and dry. No rash noted. No erythema. No pallor.   EXTREMITIES: No cyanosis, no clubbing, no edema. No Deformity.  PSYCHIATRIC: Normal mood, affect and behavior. poorly responsive but sitting with his eyes open      Lab Results     Recent Results (from the past 240 hour(s))   COVID-19 Virus PCR MRF    Specimen: Respiratory   Result Value Ref Range    COVID-19 VIRUS SPECIMEN SOURCE Nares     2019-nCOV Not Detected    COVID-19 Virus PCR MRF    Specimen: Respiratory   Result Value Ref Range    COVID-19 VIRUS SPECIMEN SOURCE Nares     2019-nCOV Not Detected            DARIO Vargas

## 2021-06-14 NOTE — PROGRESS NOTES
[facility] Admission note      Patient: Scot Bishop  MRN: [id number]        [facility] [[id number]]  Reason for Visit     Chief Complaint   Patient presents with     Review Of Multiple Medical Conditions   Evaluated for review of his psychotropic medications1    Code Status     dnr /comfort foccused    Assessment     - s/p anoxic brain injury   - Dysphagia on TF  -- long hospitalization with agitated /aggressive behavoir  Patient on multiple psychotropic meds.  -Hypotension.  - FTT    Plan     Patient has been readmitted to the TCU  He remains bed/WC bound  Mood and behaviors reviewed with staff.  He is improved but has agitation- remains hard to redirect  Non ambulatory and non responsive  No improvement in permanent vegetative state noted on exam  Completely dependent on staff for ADLs  No improvement in verbal communication either  Recheck BMPs have been stable  Continues to have occasional lability in temperature and blood pressures due to an autonomic insufficiency.  Family is working on a discharge plan to his father's home he will require a lot of adaptive equipment      History     Patient is a very pleasant 40 y.o. male who is readmitted to TCU  Pt  Is admitted to behavioral health unit due to aggressive behaviors; with hitting staff and difficulty to care  Various interventions were tried and were unsuccessful and eventually patient was started on psychotropic meds.  Eventually his behaviors escalated and he had been sent to the behavioral health unit he comes back on high doses of medication including Seroquel trazodone.  Recently Depakote was reduced to 3 times a day with improvement noted.  Today was sitting in a Broda chair.  He continues to be nonverbal with no improvement in his mood and behaviors.  Appears to be in a permanent vegetative state.  Unfortunately he is hard to redirect and gets combative very quickly  Staff feels he is tolerating his psych meds well  and are afraid of his behaviors escalating up they will discontinued the recommended continuing his current medication regimen with no changes  He recently had pulled his G-tube out  Since then he has tolerated his tube feeding well  This was replaced.  He also was noted to have behaviors including picking and scratching at his skin repetitively he is hard to redirect  He was noted to be talking to staff but his interaction at baseline remains poor.  He does not make any eye contact.  He was noted to be scratching and picking at his skin from which he is hard to redirect he has been given various sensory tools to keep him engaged.  At baseline nonambulatory and requires a Broda chair he is an assist of all cares.  In addition he has chronically low blood pressures which are felt to be secondary to his autonomic instability.  Blood pressures continue to fluctuate widely  He continues to require tube feeding.  At present he is not able to self initiate eating.  He also has low blood pressures with intermittent low-grade temperatures noted work-up in the past has been negative including extensive imaging.  It is felt secondary to autonomic instability  His father is working on discharge planning to his own home staff is helping him get adaptive equipment to make the discharge plan successful  Blood pressures and vitals remain labile weights have been stable recently  Past Medical History     Active Ambulatory (Non-Hospital) Problems    Diagnosis     Sedated     Thrush, oral     Fever     Aggression     Agitation     Low BP     G tube feedings (H)     Yeast infection     Impetigo any site     Hypoxic brain damage (H)     Acute respiratory failure with hypoxia (H)     Gastrojejunostomy tube status (H)     Hypoxic brain injury (H)     Dysphagia     Cardiac arrest (H)     Primary Lymphadenitis     Nicotine Dependence     Backache     Past Medical History:   Diagnosis Date     Acute respiratory failure with hypoxia (H)       Aggression 11/09/2020     Agitation 09/21/2020     LOWELL (acute kidney injury) (H)      Back injury, sequela 10/27/2017     Cardiac arrest (H)      Cardiac arrest (H) 05/17/2020     Cognitive disorder 11/11/2020     Dysphagia      Dysphagia 11/11/2020     Gastrojejunostomy tube status (H) 11/11/2020     HTN (hypertension)      Hypoxic ischemic encephalopathy      Low blood pressure 09/17/2020     Nonverbal 11/11/2020     NSTEMI (non-ST elevated myocardial infarction) (H)      TBI (traumatic brain injury) (H) 06/25/2020       Past Social History     Reviewed, and he  reports that he has been smoking. He has been smoking about 1.00 pack per day. He has never used smokeless tobacco. He reports current alcohol use. He reports that he does not use drugs.    Family History     Reviewed, and family history includes Cancer in his maternal grandfather; Diabetes in his maternal aunt; Diabetes type I in his maternal aunt; Lung cancer in his maternal grandfather; Other in his father.    Medication List   Post Discharge Medication Reconciliation Status:    QUEtiapine (SEROQUEL) 50 mg tablet    Indications: Aggression Administer 1 tablet via G-tube 3 times daily.   0 11/27/2020     haloperidoL (HALDOL) 5 mg tablet    Indications: Aggression Administer 1 tablet via G-tube 3 times daily.   0 11/27/2020     divalproex sprinkles (DEPAKOTE SPRINKLES) 125 mg capsule    Indications: Aggression 4 capsules 2 times daily. G tube   0 11/27/2020     traZODone (DESYREL) 50 mg tablet    Indications: Aggression Administer 1 tablet via G-tube at bedtime.   0 11/27/2020     potassium chloride (K-APOLLO) 20 mEq packet    Indications: Dehydration 1 Packet once daily with a meal. G-tube   0 11/27/2020     omeprazole (PRILOSEC) 2 mg/mL susp oral suspension    Indications: Dysphagia, unspecified type Administer 20 mL via G-tube once daily.   0 11/27/2020     metoprolol tartrate (LOPRESSOR) 25 mg tablet    Indications: Cardiac arrest (HC) Administer 0.25  tablets via G-tube 2 times daily.   0 11/27/2020     acetaminophen (TYLENOL) 325 mg tablet    Indications: History of anoxic brain injury Administer 2 tablets via G-tube every 6 hours if needed. Max acetaminophen dose: 4000mg in 24 hrs.    0 11/27/2020     ticagrelor (BRILINTA) 90 mg tablet    Indications: Cardiac arrest (HC) Administer 1 tablet via nasogastric tube 2 times daily.   0 11/27/2020     PARoxetine (PAXIL) 20 mg tablet    Indications: Aggression Administer 1 tablet via G-tube once daily.   0 11/27/2020     atorvastatin (LIPITOR) 40 mg tablet    Indications: Cardiac arrest (HC) Administer 1 tablet via G-tube at bedtime.   0 11/27/2020     aspirin chewable 81 mg chewable tablet    Indications: Cardiac arrest (HC) Administer 1 tablet via G-tube once daily.   0 11/27/2020     amantadine HCL (SYMMETREL) 50 mg/5 mL solution    Indications: History of anoxic brain injury Administer 10 mL via G-tube every 12              Allergies     No Known Allergies    Review of Systems   A comprehensive review of 14 systems was done. Pertinent findings noted here and in history of present illness. All the rest negative.  Constitutional: Negative.  Negative for fever, chills, he has activity change, appetite change and fatigue.   HENT: Negative for congestion and facial swelling.    Eyes: Negative for photophobia, redness and visual disturbance.   Respiratory: Negative for cough and chest tightness.    Cardiovascular: Negative for chest pain, palpitations and leg swelling.   Gastrointestinal: Negative for nausea, diarrhea, constipation, blood in stool and abdominal distention.   Has incontinence  Genitourinary: Negative.    Musculoskeletal: Negative.  Remains WC bound  Skin: Negative.    Neurological: Negative for dizziness, tremors, syncope, weakness, light-headedness and headaches.   Hematological: Does not bruise/bleed easily.   Psychiatric/Behavioral: Negative.  Noted to be more awake per staff      Physical Exam      Recent Vitals 1/26/2021   Weight 154 lbs 3 oz   BP 93/61   Pulse 99   Temp 98.3   Temp src -   SpO2 96   Some recent data might be hidden       Constitutional: Oriented to none and appears well-developed.   HEENT:  Normocephalic and atraumatic.  Eyes: Conjunctivae and EOM are normal. Pupils are equal, round, and reactive to light. No discharge.  No scleral icterus. Nose normal. Mouth/Throat: Oropharynx is clear and moist. No oropharyngeal exudate.    NECK: Normal range of motion. Neck supple. No JVD present. No tracheal deviation present. No thyromegaly present.   CARDIOVASCULAR: Normal rate, regular rhythm and intact distal pulses.  Exam reveals no gallop and no friction rub.  Systolic murmur present.  PULMONARY: Effort normal and breath sounds normal. No respiratory distress.No Wheezing or rales.  ABDOMEN: Soft. Bowel sounds are normal. No distension and no mass.  There is no tenderness. There is no rebound and no guarding. No HSM.  Peg tube present  Perianal skin intact ; slight excoriation  MUSCULOSKELETAL: Normal range of motion. No edema and no tenderness. Mild kyphosis, no tenderness.  LYMPH NODES: Has no cervical, supraclavicular, axillary and groin adenopathy.   NEUROLOGICAL: Alert and oriented to none. No cranial nerve deficit.  Normal muscle tone. Coordination normal.   aphasic  GENITOURINARY: Deferred exam.  SKIN: Skin is warm and dry. No rash noted. No erythema. No pallor.   EXTREMITIES: No cyanosis, no clubbing, no edema. No Deformity.  PSYCHIATRIC:abNormal mood, affect and behavior. poorly responsive but sitting with his eyes open      Lab Results     Recent Results (from the past 240 hour(s))   COVID-19 Virus PCR MRF    Specimen: Respiratory   Result Value Ref Range    COVID-19 VIRUS SPECIMEN SOURCE Nares     2019-nCOV Not Detected    COVID-19 Virus PCR MRF    Specimen: Respiratory   Result Value Ref Range    COVID-19 VIRUS SPECIMEN SOURCE Nares     2019-nCOV Not Detected            Cortney Bahena  MBBS

## 2021-06-14 NOTE — PROGRESS NOTES
[facility] Admission note      Patient: Scot Bishop  MRN: [id number]        [facility] [[id number]]  Reason for Visit     Chief Complaint   Patient presents with     Follow Up   Evaluated for review of his psychotropic medications1    Code Status     dnr /comfort foccused    Assessment     - s/p anoxic brain injury   - Dysphagia on TF  -- long hospitalization with agitated /aggressive behavoir  Patient on multiple psychotropic meds.  -Hypotension.  - FTT    Plan     Patient has been readmitted to the TCU  He remains bed/WC bound  Mood and behaviors reviewed with staff.  He continues to have intermittent agitation.  He recently was sent to the emergency room after he managed to pull his GJ tube out.  This was replaced.  He also has some skeptical behaviors including scratching and picking on his skin.  Blood pressures remain labile and frequently low.  No improvement in cognitive status.  Tolerating his tube feedings well.  Staff is working on finding appropriate placement in light of his profound impairment in cognition  He requires complete assistance with all his ADLs    History     Patient is a very pleasant 40 y.o. male who is readmitted to TCU  Pt  Is admitted to behavioral health unit due to aggressive behaviors; with hitting staff and difficulty to care  Various interventions were tried and were unsuccessful and eventually patient was started on psychotropic meds.  Eventually his behaviors escalated and he had been sent to the behavioral health unit he comes back on high doses of medication including Seroquel trazodone.  Recently Depakote was reduced to 3 times a day with improvement noted.  Today was sitting in a Broda chair.  Since then he has become more alert.  However he continues to have intermittent episodes of agitation.  He is hard to redirect and can get quite combative.  Staff reports that they have sent him back to the emergency room after he managed to pull his  GJ tube out.  This was replaced.  He also was noted to have behaviors including picking and scratching at his skin repetitively he is hard to redirect  He was noted to be talking to staff but his interaction at baseline remains poor.  He does not make any eye contact.  He was noted to be scratching and picking at his skin from which he is hard to redirect he has been given various sensory tools to keep him engaged.  At baseline nonambulatory and requires a Broda chair he is an assist of all cares.  In addition he has chronically low blood pressures which are felt to be secondary to his autonomic instability.  He continues to require tube feeding.  At present he is not able to self initiate eating.  He also has low blood pressures with intermittent low-grade temperatures noted work-up in the past has been negative including extensive imaging.  It is felt secondary to autonomic instability  Blood pressures and vitals remain labile weights have been stable recently  Past Medical History     Active Ambulatory (Non-Hospital) Problems    Diagnosis     Sedated     Thrush, oral     Fever     Aggression     Agitation     Low BP     G tube feedings (H)     Yeast infection     Impetigo any site     Hypoxic brain damage (H)     Acute respiratory failure with hypoxia (H)     Gastrojejunostomy tube status (H)     Hypoxic brain injury (H)     Dysphagia     Cardiac arrest (H)     Primary Lymphadenitis     Nicotine Dependence     Backache     Past Medical History:   Diagnosis Date     Acute respiratory failure with hypoxia (H)      Aggression 11/09/2020     Agitation 09/21/2020     LOWELL (acute kidney injury) (H)      Back injury, sequela 10/27/2017     Cardiac arrest (H)      Cardiac arrest (H) 05/17/2020     Cognitive disorder 11/11/2020     Dysphagia      Dysphagia 11/11/2020     Gastrojejunostomy tube status (H) 11/11/2020     HTN (hypertension)      Hypoxic ischemic encephalopathy      Low blood pressure 09/17/2020     Nonverbal  11/11/2020     NSTEMI (non-ST elevated myocardial infarction) (H)      TBI (traumatic brain injury) (H) 06/25/2020       Past Social History     Reviewed, and he  reports that he has been smoking. He has been smoking about 1.00 pack per day. He has never used smokeless tobacco. He reports current alcohol use. He reports that he does not use drugs.    Family History     Reviewed, and family history includes Cancer in his maternal grandfather; Diabetes in his maternal aunt; Diabetes type I in his maternal aunt; Lung cancer in his maternal grandfather; Other in his father.    Medication List   Post Discharge Medication Reconciliation Status:    QUEtiapine (SEROQUEL) 50 mg tablet    Indications: Aggression Administer 1 tablet via G-tube 3 times daily.   0 11/27/2020     haloperidoL (HALDOL) 5 mg tablet    Indications: Aggression Administer 1 tablet via G-tube 3 times daily.   0 11/27/2020     divalproex sprinkles (DEPAKOTE SPRINKLES) 125 mg capsule    Indications: Aggression 4 capsules 2 times daily. G tube   0 11/27/2020     traZODone (DESYREL) 50 mg tablet    Indications: Aggression Administer 1 tablet via G-tube at bedtime.   0 11/27/2020     potassium chloride (K-APOLLO) 20 mEq packet    Indications: Dehydration 1 Packet once daily with a meal. G-tube   0 11/27/2020     omeprazole (PRILOSEC) 2 mg/mL susp oral suspension    Indications: Dysphagia, unspecified type Administer 20 mL via G-tube once daily.   0 11/27/2020     metoprolol tartrate (LOPRESSOR) 25 mg tablet    Indications: Cardiac arrest (HC) Administer 0.25 tablets via G-tube 2 times daily.   0 11/27/2020     acetaminophen (TYLENOL) 325 mg tablet    Indications: History of anoxic brain injury Administer 2 tablets via G-tube every 6 hours if needed. Max acetaminophen dose: 4000mg in 24 hrs.    0 11/27/2020     ticagrelor (BRILINTA) 90 mg tablet    Indications: Cardiac arrest (HC) Administer 1 tablet via nasogastric tube 2 times daily.   0 11/27/2020      PARoxetine (PAXIL) 20 mg tablet    Indications: Aggression Administer 1 tablet via G-tube once daily.   0 11/27/2020     atorvastatin (LIPITOR) 40 mg tablet    Indications: Cardiac arrest (HC) Administer 1 tablet via G-tube at bedtime.   0 11/27/2020     aspirin chewable 81 mg chewable tablet    Indications: Cardiac arrest (HC) Administer 1 tablet via G-tube once daily.   0 11/27/2020     amantadine HCL (SYMMETREL) 50 mg/5 mL solution    Indications: History of anoxic brain injury Administer 10 mL via G-tube every 12              Allergies     No Known Allergies    Review of Systems   A comprehensive review of 14 systems was done. Pertinent findings noted here and in history of present illness. All the rest negative.  Constitutional: Negative.  Negative for fever, chills, he has activity change, appetite change and fatigue.   HENT: Negative for congestion and facial swelling.    Eyes: Negative for photophobia, redness and visual disturbance.   Respiratory: Negative for cough and chest tightness.    Cardiovascular: Negative for chest pain, palpitations and leg swelling.   Gastrointestinal: Negative for nausea, diarrhea, constipation, blood in stool and abdominal distention.   Has incontinence  Genitourinary: Negative.    Musculoskeletal: Negative.  Remains WC bound  Skin: Negative.    Neurological: Negative for dizziness, tremors, syncope, weakness, light-headedness and headaches.   Hematological: Does not bruise/bleed easily.   Psychiatric/Behavioral: Negative.  Noted to be more awake per staff      Physical Exam     Recent Vitals 1/19/2021   Weight 151 lbs 3 oz   /64   Pulse 98   Temp 98.1   Temp src -   SpO2 95   Some recent data might be hidden       Constitutional: Oriented to none and appears well-developed.   HEENT:  Normocephalic and atraumatic.  Eyes: Conjunctivae and EOM are normal. Pupils are equal, round, and reactive to light. No discharge.  No scleral icterus. Nose normal. Mouth/Throat:  Oropharynx is clear and moist. No oropharyngeal exudate.    NECK: Normal range of motion. Neck supple. No JVD present. No tracheal deviation present. No thyromegaly present.   CARDIOVASCULAR: Normal rate, regular rhythm and intact distal pulses.  Exam reveals no gallop and no friction rub.  Systolic murmur present.  PULMONARY: Effort normal and breath sounds normal. No respiratory distress.No Wheezing or rales.  ABDOMEN: Soft. Bowel sounds are normal. No distension and no mass.  There is no tenderness. There is no rebound and no guarding. No HSM.  Peg tube present  Perianal skin intact ; slight excoriation  MUSCULOSKELETAL: Normal range of motion. No edema and no tenderness. Mild kyphosis, no tenderness.  LYMPH NODES: Has no cervical, supraclavicular, axillary and groin adenopathy.   NEUROLOGICAL: Alert and oriented to none. No cranial nerve deficit.  Normal muscle tone. Coordination normal.   aphasic  GENITOURINARY: Deferred exam.  SKIN: Skin is warm and dry. No rash noted. No erythema. No pallor.   EXTREMITIES: No cyanosis, no clubbing, no edema. No Deformity.  PSYCHIATRIC: Normal mood, affect and behavior. poorly responsive but sitting with his eyes open      Lab Results     Recent Results (from the past 240 hour(s))   COVID-19 Virus PCR MRF    Specimen: Respiratory   Result Value Ref Range    COVID-19 VIRUS SPECIMEN SOURCE [facility]     2019-nCOV Not Detected            DARIO Vargas

## 2021-06-14 NOTE — PROGRESS NOTES
[facility] Admission note      Patient: Scot Bishop  MRN: [id number]        [facility] [[id number]]  Reason for Visit     Chief Complaint   Patient presents with     Review Of Multiple Medical Conditions   Evaluated for review of his psychotropic medications1    Code Status     dnr /comfort foccused    Assessment     - s/p anoxic brain injury   - Dysphagia on TF  -- long hospitalization with agitated /aggressive behavoir  Patient on multiple psychotropic meds.  -Hypotension.  - FTT    Plan     Patient has been readmitted to the TCU  At baseline no cognitive impairment  He remains wheelchair-bound but has now been getting more impulsive and trying to stand out  He is hard to redirect when he is impulsive.  Psychotropic medication regimen is working for him.  GJ tube feeding is going well and his weights have been stable  Recheck electrolytes and kidney functions have been stable also.  Discharge planning reviewed with  and planning with family to discharge him to a group home this week.  All discharge papers were signed for that  He will require long-term placement      History     Patient is a very pleasant 40 y.o. male who is readmitted to TCU  Pt  Is admitted to behavioral health unit due to aggressive behaviors; with hitting staff and difficulty to care  Various interventions were tried and were unsuccessful and eventually patient was started on psychotropic meds.  Eventually his behaviors escalated and he had been sent to the behavioral health unit he comes back on high doses of medication including Seroquel trazodone.  Recently Depakote was reduced to 3 times a day with improvement noted.  Today was sitting in a Broda chair.  Unfortunately now he is reporting as per staff some improved movement and has occasionally been even noted to be is trying to stand up  Unfortunately he is a high fall risk  He is tolerating his medication with no sedation concerns  He  continues to be nonverbal with no improvement in his mood and behaviors.  Appears to be in a permanent vegetative state.  Unfortunately he is hard to redirect and gets combative very quickly  Staff feels he is tolerating his psych meds well and are afraid of his behaviors escalating up they will discontinued the recommended continuing his current medication regimen with no changes  He recently had pulled his G-tube out  Since then he has tolerated his tube feeding well  This was replaced.  At baseline nonambulatory and requires a Broda chair he is an assist of all cares.  In addition he has chronically low blood pressures which are felt to be secondary to his autonomic instability.  Blood pressures continue to fluctuate widely  He continues to require tube feeding.  At present he is not able to self initiate eating.  He also has low blood pressures with intermittent low-grade temperatures noted work-up in the past has been negative including extensive imaging.  It is felt secondary to autonomic instability  His father is working on discharge planning to his own home staff is helping him get adaptive equipment to make the discharge plan successful  Blood pressures and vitals remain labile weights have been stable recently  He is looking at discharging to an assisted living facility/group home this week staff is requesting for some paperwork they have requested a Covid test to be negative before they will accept him    Past Medical History     Active Ambulatory (Non-Hospital) Problems    Diagnosis     Sedated     Thrush, oral     Fever     Aggression     Agitation     Low BP     G tube feedings (H)     Yeast infection     Impetigo any site     Hypoxic brain damage (H)     Acute respiratory failure with hypoxia (H)     Gastrojejunostomy tube status (H)     Hypoxic brain injury (H)     Dysphagia     Cardiac arrest (H)     Primary Lymphadenitis     Nicotine Dependence     Backache     Past Medical History:   Diagnosis  Date     Acute respiratory failure with hypoxia (H)      Aggression 11/09/2020     Agitation 09/21/2020     LOWELL (acute kidney injury) (H)      Back injury, sequela 10/27/2017     Cardiac arrest (H)      Cardiac arrest (H) 05/17/2020     Cognitive disorder 11/11/2020     Dysphagia      Dysphagia 11/11/2020     Gastrojejunostomy tube status (H) 11/11/2020     HTN (hypertension)      Hypoxic ischemic encephalopathy      Low blood pressure 09/17/2020     Nonverbal 11/11/2020     NSTEMI (non-ST elevated myocardial infarction) (H)      TBI (traumatic brain injury) (H) 06/25/2020       Past Social History     Reviewed, and he  reports that he has been smoking. He has been smoking about 1.00 pack per day. He has never used smokeless tobacco. He reports current alcohol use. He reports that he does not use drugs.    Family History     Reviewed, and family history includes Cancer in his maternal grandfather; Diabetes in his maternal aunt; Diabetes type I in his maternal aunt; Lung cancer in his maternal grandfather; Other in his father.    Medication List   Post Discharge Medication Reconciliation Status:    QUEtiapine (SEROQUEL) 50 mg tablet    Indications: Aggression Administer 1 tablet via G-tube 3 times daily.   0 11/27/2020     haloperidoL (HALDOL) 5 mg tablet    Indications: Aggression Administer 1 tablet via G-tube 3 times daily.   0 11/27/2020     divalproex sprinkles (DEPAKOTE SPRINKLES) 125 mg capsule    Indications: Aggression 4 capsules 2 times daily. G tube   0 11/27/2020     traZODone (DESYREL) 50 mg tablet    Indications: Aggression Administer 1 tablet via G-tube at bedtime.   0 11/27/2020     potassium chloride (K-APOLLO) 20 mEq packet    Indications: Dehydration 1 Packet once daily with a meal. G-tube   0 11/27/2020     omeprazole (PRILOSEC) 2 mg/mL susp oral suspension    Indications: Dysphagia, unspecified type Administer 20 mL via G-tube once daily.   0 11/27/2020     metoprolol tartrate (LOPRESSOR) 25 mg  tablet    Indications: Cardiac arrest (HC) Administer 0.25 tablets via G-tube 2 times daily.   0 11/27/2020     acetaminophen (TYLENOL) 325 mg tablet    Indications: History of anoxic brain injury Administer 2 tablets via G-tube every 6 hours if needed. Max acetaminophen dose: 4000mg in 24 hrs.    0 11/27/2020     ticagrelor (BRILINTA) 90 mg tablet    Indications: Cardiac arrest (HC) Administer 1 tablet via nasogastric tube 2 times daily.   0 11/27/2020     PARoxetine (PAXIL) 20 mg tablet    Indications: Aggression Administer 1 tablet via G-tube once daily.   0 11/27/2020     atorvastatin (LIPITOR) 40 mg tablet    Indications: Cardiac arrest (HC) Administer 1 tablet via G-tube at bedtime.   0 11/27/2020     aspirin chewable 81 mg chewable tablet    Indications: Cardiac arrest (HC) Administer 1 tablet via G-tube once daily.   0 11/27/2020     amantadine HCL (SYMMETREL) 50 mg/5 mL solution    Indications: History of anoxic brain injury Administer 10 mL via G-tube every 12              Allergies     No Known Allergies    Review of Systems   A comprehensive review of 14 systems was done. Pertinent findings noted here and in history of present illness. All the rest negative.  Constitutional: Negative.  Negative for fever, chills, he has activity change, appetite change and fatigue.   HENT: Negative for congestion and facial swelling.    Eyes: Negative for photophobia, redness and visual disturbance.   Respiratory: Negative for cough and chest tightness.    Cardiovascular: Negative for chest pain, palpitations and leg swelling.   Gastrointestinal: Negative for nausea, diarrhea, constipation, blood in stool and abdominal distention.   Has incontinence  Genitourinary: Negative.    Musculoskeletal: Negative.  Remains WC bound  Skin: Negative.    Neurological: Negative for dizziness, tremors, syncope, weakness, light-headedness and headaches.   Hematological: Does not bruise/bleed easily.   Psychiatric/Behavioral: Negative.   Noted to be more awake per staff      Physical Exam     Recent Vitals 2/2/2021   Weight 153 lbs   /64   Pulse 74   Temp 98.3   Temp src -   SpO2 96   Some recent data might be hidden       Constitutional: Oriented to none and appears well-developed.   HEENT:  Normocephalic and atraumatic.  Eyes: Conjunctivae and EOM are normal. Pupils are equal, round, and reactive to light. No discharge.  No scleral icterus. Nose normal. Mouth/Throat: Oropharynx is clear and moist. No oropharyngeal exudate.    NECK: Normal range of motion. Neck supple. No JVD present. No tracheal deviation present. No thyromegaly present.   CARDIOVASCULAR: Normal rate, regular rhythm and intact distal pulses.  Exam reveals no gallop and no friction rub.  Systolic murmur present.  PULMONARY: Effort normal and breath sounds normal. No respiratory distress.No Wheezing or rales.  ABDOMEN: Soft. Bowel sounds are normal. No distension and no mass.  There is no tenderness. There is no rebound and no guarding. No HSM.  Peg tube present  Perianal skin intact ; slight excoriation  MUSCULOSKELETAL: Normal range of motion. No edema and no tenderness. Mild kyphosis, no tenderness.  LYMPH NODES: Has no cervical, supraclavicular, axillary and groin adenopathy.   NEUROLOGICAL: Alert and oriented to none. No cranial nerve deficit.  Normal muscle tone. Coordination normal.   aphasic  GENITOURINARY: Deferred exam.  SKIN: Skin is warm and dry. No rash noted. No erythema. No pallor.   EXTREMITIES: No cyanosis, no clubbing, no edema. No Deformity.  PSYCHIATRIC:abNormal mood, affect and behavior. poorly responsive but sitting with his eyes open      Lab Results     Recent Results (from the past 240 hour(s))   COVID-19 Virus PCR MRF    Specimen: Respiratory   Result Value Ref Range    COVID-19 VIRUS SPECIMEN SOURCE [facility]     2019-nCOV Not Detected    COVID-19 Virus PCR MRF    Specimen: Respiratory   Result Value Ref Range    COVID-19 VIRUS SPECIMEN SOURCE [facility]      2019-nCOV       Test received-See reflex to IDDL test SARS CoV2 (COVID-19) Virus RT-PCR   SARS-CoV-2 (COVID-19)-PCR    Specimen: Respiratory   Result Value Ref Range    SARS-CoV-2 Virus Specimen Source [facility]     SARS-CoV-2 PCR Result NEGATIVE     SARS-COV-2 PCR COMMENT (Note)            DARIO Vargas

## 2021-06-14 NOTE — PROGRESS NOTES
[facility]      NAME:  Scot Bishop             :  1980    MRN: [id number]    CODE STATUS: DNR    FACILITY: [facility] (formerly [facility]) [[id number]]       CHIEF COMPLAIN/REASON FOR VISIT:  Chief Complaint   Patient presents with     Problem Visit     gtube removed       HISTORY OF PRESENT ILLNESS: Scot Bishop is a 40 y.o. male being seen per today*per nursing request. He was at ED last night until 1 am this am as he had pulled Gtube out and new tube placed. He recently readmitted from the hospital after an extensive 3-week stay, he had aggressive behaviors here on the TCU and was sent out and was followed by psychiatric care at [facility]. We have been monitoring his LOC and has been sleepy, has TBI due to hypoxia.   He is a TBI and seen in common area this a.m. nonverbal he has a history of aphasia very with  poor eye contact.  [facility] [place] [facility] [place] to a [facility] in May 2020. At that juncture he was in cardiac cath lab and suffered a hypoxic brain injury, high temp and thrombotic event. He is seen in his room today. Non verbal and did not track with his eyes. He will make eye contact. H. He will need ongoing rehab services with PT/OT/ST. Has Gtube due to his dysphagia,  Unfortunealy due to Scot Hypoxic brain injury he will not be able to understand hygiene education and nursing will need to meet these needs for pt. Therapy continues to work with him, seen ambualting with SBA two.Staff to anticipate needs and provide good elizabeth care two times a day and prn.   continues to work with family towards DC to a group home after TCU stay as patient will be unable to care for self, remains dependant on staff for all ADL and mobility as well as GTube for feedings,. Remains NPO as  if he gets aggressive he could aspirate. Scot is very awake today. Today with minimal eye contact, up in a broda chair for positioning and he will continue to need 24 hour nursing  care. Nursing reports a bit more agitated or restless lately, pulled gtube out last night.      . No Known Allergies:     Current Outpatient Medications   Medication Sig     acetaminophen (TYLENOL) 650 mg/20.3 mL Soln 650 mg every 6 (six) hours as needed. Via PEG-tube      amantadine HCL (SYMMETREL) 50 mg/5 mL solution 100 mg every 12 (twelve) hours. Via PEG-tube     aspirin 81 MG EC tablet 81 mg daily. Via PEG-tube     atorvastatin (LIPITOR) 40 MG tablet 40 mg at bedtime. Via PEG-tube     haloperidoL (HALDOL) 5 MG tablet 5 mg by G-tube route 3 (three) times a day.     lisinopriL (PRINIVIL,ZESTRIL) 2.5 MG tablet 1.25 mg daily. Via PEG-tube, hold if SBP <90     LORazepam (ATIVAN) 0.5 MG tablet 1 tablet (0.5 mg total) by G-tube route 4 (four) times a day for 3 days.     metoprolol tartrate (LOPRESSOR) 25 MG tablet 6.25 mg 2 (two) times a day. Via PEG-tube, hold if SBP <90, HR <60     neomycin-bacitracin-polymyxin B (NEOSPORIN) 3.5-400-5,000 mg-unit-unit OiPk ointment Apply 1 application topically 2 (two) times a day.     omeprazole (PRILOSEC) 2 mg/mL SusR suspension 40 mg daily before breakfast. Via PEG-tube     PARoxetine (PAXIL) 20 MG tablet 20 mg by G-tube route every morning.     QUEtiapine (SEROQUEL) 25 MG tablet 50 mg 3 (three) times a day. Via PEG-tube give 25 mg am/pm with 12.5 mg mid day due to increased agitation     ticagrelor (BRILINTA) 90 mg Tab 90 mg 2 (two) times a day. Via PEG-tube     traZODone (DESYREL) 50 MG tablet 50 mg at bedtime. Via PEG-tube     valproate (DEPAKENE) 250 mg/5 mL 250 mg by G-tube route 2 (two) times a day.     white petrolatum (AQUAPHOR ORIGINAL) 41 % Oint Apply 1 application topically 2 (two) times a day. Apply to feet/legs         REVIEW OF SYSTEMS:  Unable to verbalize due to aphasia    PHYSICAL EXAMINATION:  Vitals:    01/13/21 1652   BP: 98/80   Pulse: (!) 102   Temp: 98.4  F (36.9  C)   Weight: 152 lb 3.2 oz (69 kg)         GENERAL: Does not follow simple commands, makes eye  contact, non verbal.  HEENT: Head is normocephalic with normal hair distribution. No evidence of trauma.Oral: reddened wth white coating on tongue left inner cheek Ears: No acute purulent discharge. Eyes: Conjunctivae pink with no scleral jaundice. Nose: Normal mucosa and septum. NECK: Supple with no cervical or supraclavicular lymphadenopathy. Trachea is midline, healing trach stomaLungs : He is CTA  EXTREMITIES: Atraumatic. Full range of motion on both upper and lower extremities, there is no tenderness to palpation, no pedal edema, no cyanosis or clubbing, no calf tenderness, normal cap refill, no joint swelling.  SKIN: Warm and dry, no erythema noted, abdomen with g tube  NEUROLOGICAL: The patient is oriented to person, TBI    Social Distancing with PPE practiced due to Covid 19    LABS:    Lab Results   Component Value Date    WBC 10.6 12/08/2020    HGB 13.7 (L) 12/08/2020    HCT 43.8 12/08/2020    MCV 94 12/08/2020     12/08/2020       Results for orders placed or performed in visit on 12/24/20   Basic Metabolic Panel   Result Value Ref Range    Sodium 138 136 - 145 mmol/L    Potassium 4.0 3.5 - 5.0 mmol/L    Chloride 102 98 - 107 mmol/L    CO2 23 22 - 31 mmol/L    Anion Gap, Calculation 13 5 - 18 mmol/L    Glucose 126 (H) 70 - 125 mg/dL    Calcium 9.6 8.5 - 10.5 mg/dL    BUN 17 8 - 22 mg/dL    Creatinine 0.75 0.70 - 1.30 mg/dL    GFR MDRD Af Amer >60 >60 mL/min/1.73m2    GFR MDRD Non Af Amer >60 >60 mL/min/1.73m2           No results found for: HGBA1C  No results found for: FHYBBIFI47BT  No results found for: DITJYFRK06    ASSESSMENT/PLAN:  1. Gastrojejunostomy tube status (H)    2. Hypoxic brain damage (H)      1. Gtube status: Was replaced in ER. I recommend a belly binder for support of GTube.He is dependant on Gtube for his nutrition.    2. Hypoxic Brain injury: Scot is in a semi vegative state, med changes made in acute care and  is less aggressive now. Total dependance on staff for all ADL and  med/tube management.  Pallative consult with [facility] in place. Rounding MD did decrease his Depakote but remains calm and minimally responsive with episodes of agitation . Further dose reductions not recommended at this time due to adverse reactions and behaviors he has ad in the past       Electronically signed by:  Fouzia High CNP  This progress note was completed using Dragon software and there may be grammatical errors.

## 2021-06-15 ENCOUNTER — DOCUMENTATION ONLY (OUTPATIENT)
Dept: INTERNAL MEDICINE | Facility: CLINIC | Age: 41
End: 2021-06-15

## 2021-06-15 NOTE — PROGRESS NOTES
Mary Anne DE LA O with [facility]  calling,  [phone]    Requesting Speech therapy orders for    1x a week for 4 weeks.    Informed approved per standing orders.  staff will fax these orders for MD signature.      Akanksha MILLERN, RN, PHN

## 2021-06-15 NOTE — TELEPHONE ENCOUNTER
José Miguel calls to ask about G-tube replacement.    G-tube was placed at the ED on 1/12/21. No documentation in chart mentions on when it is due to be changed.    FNA tried calling José Miguel back but phone number is incorrect. FNA plans to advice:  - recommended G-tube change is every 3 months  - reach out to PCP/gastro on G-tube replacement.    Melonie Marvin RN/[place] Nurse Advisor    Additional Information    Wrong number reached. Phone number verified.    Protocols used: NO CONTACT OR DUPLICATE CONTACT CALL-A-OH

## 2021-06-16 ENCOUNTER — VIRTUAL VISIT (OUTPATIENT)
Dept: PSYCHIATRY | Facility: CLINIC | Age: 41
End: 2021-06-16
Payer: COMMERCIAL

## 2021-06-16 DIAGNOSIS — G25.9 EXTRAPYRAMIDAL RIGIDITY: ICD-10-CM

## 2021-06-16 DIAGNOSIS — F51.04 PSYCHOPHYSIOLOGICAL INSOMNIA: ICD-10-CM

## 2021-06-16 DIAGNOSIS — F34.81 DISRUPTIVE MOOD DYSREGULATION DISORDER (H): Primary | ICD-10-CM

## 2021-06-16 PROCEDURE — 99214 OFFICE O/P EST MOD 30 MIN: CPT | Mod: 95 | Performed by: NURSE PRACTITIONER

## 2021-06-16 NOTE — PROGRESS NOTES
"    Outpatient Psychiatric Progress Note    Name: Scot Bishop   : 1980                    Primary Care Provider: Cachorro Morales MD   Therapist: None    PHQ-9 scores:  No flowsheet data found.    CLEMENT-7 scores:  No flowsheet data found.    Patient Identification:    Patient is a 40 year old year old, single  White Not  or  male  who presents for return visit with me.  Patient is currently disabled. Patient attended the session with His father and stepmother , who they agreed to have interview with. Patient prefers to be called: \" Scot\".    Interim History:    I last saw Scot Bishop for outpatient psychiatry Return Visit on May 26, 2021.     During that appointment, he appeared to be making slight improvements in his demeanor was reported less incidence of aggression towards staff and himself.  He is participating more in therapies to strengthen him and his father will contact me to review other comments from the physical therapy staff regarding his muscle movements.  He was calm and responded to me when I called his name during this video visit.  Staff and his parents asked that no medication changes being made for now.  Scot appears to be sleeping better..    .     Current medications include: acetaminophen (TYLENOL) 325 MG tablet, Take 1 tablet (325 mg) by mouth every 6 hours as needed for mild pain or fever  amantadine (SYMMETREL) 50 MG/5ML syrup, 10 mLs (100 mg) by Per G Tube route every 12 hours VIA PEG-Tube  aspirin (ASA) 81 MG chewable tablet, 1 tablet (81 mg) by Per Feeding Tube route daily  atorvastatin (LIPITOR) 40 MG tablet, 1 tablet (40 mg) by Per G Tube route At Bedtime Via PEG-tube  bacitracin 500 UNIT/GM OINT, Apply 1 Application topically 2 times daily  - Topical as needed  bacitracin-neomycin-polymyxin (NEOSPORIN) 400-5-5000 external ointment, Apply 1 Application topically 2 times daily Topical as needed  benztropine (COGENTIN) 0.5 MG tablet, Take 1 tablet (0.5 mg) " by mouth 2 times daily  bromocriptine (PARLODEL) 2.5 MG tablet, 2.5 mg by Per G Tube route  docusate sodium (COLACE) 100 MG capsule, Take 1 capsule (100 mg) by mouth 2 times daily as needed for constipation  Emollient (AQUAPHOR ADVANCED THERAPY) OINT, Apply topically 2 times daily As needed.  lisinopril (ZESTRIL) 2.5 MG tablet,   LORazepam (ATIVAN) 0.5 MG tablet, Take 1 tablet (0.5 mg) by mouth 2 times daily  metoclopramide (REGLAN) 5 MG tablet,   metoprolol tartrate (LOPRESSOR) 25 MG tablet, Take 0.5 tablets (12.5 mg) by mouth 2 times daily  nystatin (MYCOSTATIN) 625263 UNIT/GM external cream, Apply topically 2 times daily As needed  OLANZapine (ZYPREXA) 5 MG tablet, Take 1 tablet (5 mg) by mouth 2 times daily  omeprazole (PRILOSEC) 2 mg/mL suspension, 40 mg by Per Feeding Tube route every morning (before breakfast)   pantoprazole (PROTONIX) 40 MG EC tablet, Take 40 mg by mouth  PARoxetine (PAXIL) 20 MG tablet, 1 tablet (20 mg) by Oral or Feeding Tube route every morning  Pediatric Multiple Vit-C-FA (CHILDRENS CHEWABLE VITAMINS) CHEW, Take 1 tablet by mouth  QUEtiapine (SEROQUEL) 50 MG tablet, Take 1 tablet (50 mg) by mouth daily at 2 pm  ticagrelor (BRILINTA) 90 MG tablet, Take 1 tablet (90 mg) by mouth 2 times daily  Valproate Sodium (VALPROIC ACID) 250 MG/5ML, 10 mLs (500 mg) by Per PEG tube route 2 times daily    No current facility-administered medications on file prior to visit.        The [place] Prescription Monitoring Program has been reviewed and there are no concerns about diversionary activity for controlled substances at this time.      I was able to review most recent Primary Care Provider, specialty provider, and therapy visit notes that I have access to.     Today, patient reports hata he has been picking and ad kicking the chair, almost falling backwards.  He goes backwards in the chair.  He is still eating food.  Weight 164 and is 5 foot 9 inches tall.   Scot requires a 2 person transfer.  He  is sleeping at night.   He does not get as agitated since medication changes were made.  He likes physical therapy.  When he meets with speech therapy he gets frustrated.  Overall, assisted living staff feel that Scot is manageable and they recommend  No medication changes.     has no past medical history on file.    Social history updates:    Patient has no changes in his social history    Substance use updates:    There is no substance use  Tobacco use: No    Vital Signs:   There were no vitals taken for this visit.    Labs:    Most recent laboratory results reviewed and no new labs.     Review of Systems:  10 systems (general, cardiovascular, respiratory, eyes, ENT, endocrine, GI, , M/S, neurological) were reviewed. Most pertinent finding(s) is/are: He continues to be nonverbal, requiring 2 person assist, no skin rashes. The remaining systems are all unremarkable.    Mental Status Examination:  Appearance:  awake, alert, mild distress and casually dressed  Attitude:  cooperative   Eye Contact:  adequate  Gait and Station: No dizziness or falls and Uses wheelchair  Psychomotor Behavior:  fidgeting  Oriented to:  Person  Attention Span and Concentration:  Easily distracted  Speech:   mute  Mood:  anxious  Affect:  intensity is heightened  Associations:  Unable to assess  Thought Process:  Unable to assess  Thought Content:  no evidence of suicidal ideation or homicidal ideation  Recent and Remote Memory:  poor Not formally assessed. No amnesia.  Fund of Knowledge: low-normal  Insight:  limited  Judgment:  poor  Impulse Control:  poor    Suicide Risk Assessment:  Today Scot Bishop's parents and assisted living staff report no attempts to end his life but he does have aggression outbursts towards staff. In addition, there are notable risk factors for self-harm, including anxiety, comorbid medical condition of Traumatic brain injury with intellectual deficits, anger/rage and mood change. However, risk is  mitigated by 24-hour supervision by assisted living staff. Therefore, based on all available evidence including the factors cited above, Scot Bishop does not appear to be at imminent risk for self-harm, does not meet criteria for a 72-hr hold, and therefore remains appropriate for ongoing outpatient level of care.  A thorough assessment of risk factors related to suicide and self-harm have been reviewed and are noted above. The patient convincingly denies suicidality on several occasions. Local community safety resources printed and reviewed for patient to use if needed. There was no deceit detected, and the patient presented in a manner that was believable.     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  Created by Conversion       Backache 04/16/2021     Priority: Medium     Formatting of this note might be different from the original.  Created by Conversion    Replacement Utility updated for latest IMO load       Thrombocytopenia, unspecified (H) 04/16/2021     Priority: Medium     Thrush, oral 12/09/2020     Priority: Medium     Advance care planning 12/04/2020     Priority: Medium     Formatting of this note might be different from the original.  Community Palliative Care was asked to see patient by inpatient palliative care on 11/23 for continuation of palliative care in a new setting.  Date of last visit: 12/9/2020    Formatting of this note is different from the original.  Advance Care Planning   Patient has identified Health Care Agent(s): Yes  Add Health Care Agents: Yes    Health  Care Agent(s):  Guardian: Gerald Bishop Relationship: father Phone: [phone]     Patient has Advance Care Plan Documents (Health Care Directive, POLST): Yes    Advance Care Plan Documents:  POLST Form     Patient has identified Specific Treatment Preferences: Yes     How have preferences been verified: POLST    Specific Treatment Preferences:   a.) Code Status:  DNR/ Do Not Attempt Resuscitation - Allow a Natural Death  b.) Goals of Treatment:   ii. Selective Treatment: Use medical treatment, antibiotics, IV fluids and cardiac monitor as indicated. No intubation, advanced airway interventions, or mechanical ventilation. May consider less invasive airway support (e.g. CPAP, BiPAP). Transfer to hospital if indicated. Generally avoid the intensive care unit. All patients will receive comfort-focused treatments.  TREATMENT PLAN: Provide basic medical treatments aimed at treating new or reversible illness.  c.) Interventions and Treatments:  i.  Artificially Administered Nutrition and Hydration: - Long term artificial nutrion/hydration by tube through (not specified) (specify mouth/nose) and (not specified) (specify if ok with directly into GI tract)  ii.  Antibiotics: - Oral antibiotics only (NO IV/IM)       Acute respiratory failure with hypoxia (H) 12/04/2020     Priority: Medium     Nonverbal 11/11/2020     Priority: Medium     Gastrojejunostomy tube status (H) 11/11/2020     Priority: Medium     Cognitive disorder 11/11/2020     Priority: Medium     Aggression 11/09/2020     Priority: Medium     Agitation 09/21/2020     Priority: Medium     Yeast infection 09/03/2020     Priority: Medium     G tube feedings (H) 09/03/2020     Priority: Medium     Dysphagia 08/04/2020     Priority: Medium     Traumatic brain injury (H) 06/25/2020     Priority: Medium     Cardiac arrest (H) 05/17/2020     Priority: Medium     Back injury, sequela 10/27/2017     Priority: Medium     Formatting of this note might be different from  the original.   Case w/ [employer] ([email or web address])  CLM #[id number];  Lydia Machuca [phone]         Assessment:    Scot Bishop continues to have some anger outbursts but assisted living staff report no concerns to change medications.  He will continue valproic acid for impulsive behaviors.  Quetiapine is at 2 PM only.  Paroxetine continues for depression and anxiety symptoms.  Amantadine and benztropine are continued for dystonic muscle movements.  Lorazepam has been helpful in managing his anxiety level and that we will continue twice daily at 0.5 mg.  He will continue olanzapine twice daily to help with irritability.  We will await assessment from physical therapy regarding Scot's muscle tone before making other medication changes.    Medication side effects and alternatives were reviewed. Health promotion activities recommended and reviewed today. All questions addressed. Education and counseling completed regarding risks and benefits of medications and psychotherapy options.    Treatment Plan:        1.    Continue valproic acid  500 mg (10 ml) twice daily per PEG tube    2.    Continue benztropine 0.5 mg twice daily     3.    Will await to see what physical therapy has to say about Scot's muscle tone before making other medication changes    4.    Continue paroxetine 20 mg daily    5.    Change quetiapine to 50 mg at 2 PM    6.    Continue amantadine 100 mg twice daily    7.    Lorazepam 0.5 mg twice daily    8.    Continue olanzapine 5 mg twice daily    9.    Review use of Colace for constipation with primary care provider      Continue all other medications as reviewed per electronic medical record today.     Safety plan reviewed. To the Emergency Department as needed or call after hours crisis line at [phone] or [phone]. [place] Crisis Text Line. Text [place] to 877661 or Suicide LifeLine Chat: suicidepreventionlifeline.org/chat/    To schedule individual or  family therapy, call [facility] at [phone].    Schedule an appointment with me in July or sooner as needed. Call [facility] at [phone] to schedule.    Follow up with primary care provider as planned or for acute medical concerns.    Call the psychiatric nurse line with medication questions or concerns at [phone].    MyChart may be used to communicate with your provider, but this is not intended to be used for emergencies.    Crisis Resources:    National Suicide Prevention Lifeline: 798.834.1819 (TTY: 948.672.2583). Call anytime for help.  (www.suicidepreventionlifeline.org)  National [place] on Mental Illness (www.mel[place].org): 448.356.6340 or 079-084-4418.   Mental Health Association (www.mentalhealth.org): 613.970.6592 or 221-898-7006.  [place] Crisis Text Line: Text [place] to 143167  Suicide LifeLine Chat: suicidepreXconomyline.org/chat    Administrative Billing:   Time spent with patient was 30 minutes and greater than 50% of time or 20 minutes was spent in counseling and coordination of care regarding above diagnoses and treatment plan.    Patient Status:  Patient will continue to be seen for ongoing consultation and stabilization.    Signed:   LASHAWN Jeong-BC   Psychiatry

## 2021-06-17 ENCOUNTER — MEDICAL CORRESPONDENCE (OUTPATIENT)
Dept: HEALTH INFORMATION MANAGEMENT | Facility: CLINIC | Age: 41
End: 2021-06-17

## 2021-06-20 NOTE — LETTER
Letter by Cortney Bahena MBBS at      Author: Cortney Bahena MBBS Service: -- Author Type: --    Filed:  Encounter Date: 8/27/2020 Status: (Other)         Patient: Scot Bishop   MR Number: [id number]   YOB: 1980   Date of Visit: 8/27/2020       [facility]  note      Patient: Scot Bishop  MRN: [id number]  Date of Service: 8/27/2020      [facility] [[id number]]  Reason for Visit     Chief Complaint   Patient presents with   ? Review Of Multiple Medical Conditions   Follow-up on fever /low bp    Code Status     FULL CODE    Assessment     -Low-grade temperatures of unclear etiology  -Persistent hypotension low blood pressures noted  -Acute VF arrest secondary to acute ST SUKI  -Acute LAD occlusion with underlying history of coronary artery disease  -Hypoxic brain injury  - Severe dysphagia currently discharged on tube feeding; now with staff reporting patient constantly pulling tube feeding out  -Aspiration pneumonia currently resolved  -Acute hypoxic respiratory failure currently resolved  -History of lengthy stay in the hospital with multiple procedures including requiring intubation and ECMO support from 5 17-5 19 with tracheostomy and PEG placement.  -LOWELL currently resolved  -Suspected dehydration due to patient refusing to feeding    Plan     Patient has been admitted to the TCU as a transfer from the hospital where he had a lengthy stay of more than 2 months.  He has been on medical management and remains chest pain-free.  He is tolerating his meds well but is limited by low blood pressures.  He has been on tube feeding since admission.  Unfortunately he is going to be nowhere close to starting eating trials.  He has no interest and does not follow commands either.  He is currently on bolus feeding  via tube and that seems to be going well.  No nausea reported by nurses no emesis either.  He continues to fall and has been placed on a modified bed to minimize  injuries due to the falls.  He has had multiple episodes of low-grade temperature which are being monitored closely these are suspected to be due to autonomic insufficiency today he is afebrile.  Blood pressures remain frequently low.  He is awaiting placement in a group home      History     Patient is a very pleasant 40 y.o. male who is admitted to TCU  Patient has been admitted after a very lengthy more than 60-day stay in the hospital.  He presented following PEA/VF arrest in the community.  He was admitted and noted to have anterior acute ST SUKI.  He had multiple episodes of cardiac arrest  He required ECMO support and was emergently taken for cardiac catheterization which showed a thrombotic occlusion of the LAD which was treated with aspiration thrombectomy and PCI with drug-eluting stent of the proximal LAD.  He is on medical management.  Unfortunately ability to tolerate medication is limited due to low blood pressures.  He has not been complaining of any chest pain or discomfort    He also suffered from hypoxic brain injury.  Initial CT of the head was negative but EEG shows severe diffuse encephalopathy without seizure or epilepsy.  Repeat CT did show multifocal acute/subacute cerebral infarcts.  And brain injury which is consistent with hypoxic brain injury.  No improvement noted in the TCU he continues to be pretty nonresponsive and nonverbal.    Therapy is reporting some improvement in participation but nursing reports that he is doing the same behaviors as before with no improvement noted  He remains pretty much unresponsive.  No communication obtained.    He remains on tube feeding.  Recently has had a lot of difficulties with tube feeding.  He was sent him back in the hospital for G-tube replacement.  Due to his repeated difficulty with G-tube he has been switched from 24/7 feeding to bolus feeding during the daytime which has worked much better.  All his meds are given via tube as he does not  swallow well either  His binder has been removed and his PEG tube insertion site seems to be healed up nicely with no drainage noted however he does have excoriation of the skin with scabbing noted with nursing believes is secondary to his binder rubbing against his skin    He has had multiple falls in the TCU.  Placement efforts have been unsuccessful and finally has been transferred to mattress on the floor.  Unfortunately remains hard to care for in light of his profound cognitiv impairment.  He was having intermittent fevers and was being monitored it is suspected to be due to autonomic insufficiency recheck temperatures have been normal though localizing symptoms of any temperature been obtained either          Past Medical History     Active Ambulatory (Non-Hospital) Problems    Diagnosis   ? Impetigo any site   ? Hypoxic brain damage (H)   ? Acute respiratory failure with hypoxia (H)   ? Gastrojejunostomy tube status (H)   ? Hypoxic brain injury (H)   ? Dysphagia   ? Cardiac arrest (H)   ? Primary Lymphadenitis   ? Nicotine Dependence   ? Backache     Past Medical History:   Diagnosis Date   ? Acute respiratory failure with hypoxia (H)    ? LOWELL (acute kidney injury) (H)    ? Cardiac arrest (H)    ? Dysphagia    ? HTN (hypertension)    ? Hypoxic ischemic encephalopathy    ? NSTEMI (non-ST elevated myocardial infarction) (H)        Past Social History     Reviewed, and he  reports that he has been smoking. He has never used smokeless tobacco. He reports current alcohol use. He reports that he does not use drugs.    Family History     Reviewed, and family history includes Diabetes in his maternal aunt; Lung cancer in his maternal grandfather; Other in his father.    Medication List   Post Discharge Medication Reconciliation Status: discharge medications reconciled, continue medications without change   Current Outpatient Medications on File Prior to Visit   Medication Sig Dispense Refill   ? acetaminophen  (TYLENOL) 650 mg/20.3 mL Soln 650 mg every 6 (six) hours as needed. Via PEG-tube      ? amantadine HCL (SYMMETREL) 50 mg/5 mL solution 100 mg every 12 (twelve) hours. Via PEG-tube     ? aspirin 81 MG EC tablet 81 mg daily. Via PEG-tube     ? atorvastatin (LIPITOR) 40 MG tablet 40 mg at bedtime. Via PEG-tube     ? bromocriptine (PARLODEL) 2.5 mg tablet 2.5 mg 2 (two) times a day. Via PEG-tube     ? chlorhexidine (PERIDEX) 0.12 % solution Apply 15 mL to the mouth or throat 4 (four) times a day.     ? furosemide (LASIX) 10 mg/mL solution 10 mg every other day. Via PEG-tube, hold if SBP <90     ? levETIRAcetam (KEPPRA) 100 mg/mL solution 1,000 mg 2 (two) times a day. Via PEG-tube     ? lisinopriL (PRINIVIL,ZESTRIL) 2.5 MG tablet 1.25 mg daily. Via PEG-tube, hold if SBP <90     ? LORazepam (ATIVAN) 0.5 MG tablet 1 tablet (0.5 mg total) by G-tube route 4 (four) times a day. 120 tablet 0   ? metoclopramide (REGLAN) 5 MG tablet 5 mg 2 (two) times a day. Via PEG-tube     ? metoprolol tartrate (LOPRESSOR) 25 MG tablet 6.25 mg 2 (two) times a day. Via PEG-tube, hold if SBP <90, HR <60     ? neomycin-bacitracin-polymyxin B (NEOSPORIN) 3.5-400-5,000 mg-unit-unit OiPk ointment Apply 1 application topically 2 (two) times a day.     ? omeprazole (PRILOSEC) 2 mg/mL SusR suspension 40 mg daily before breakfast. Via PEG-tube     ? pantoprazole (PROTONIX) 40 MG tablet 40 mg daily. Via PEG-tube     ? potassium bicarb-citric acid 20 mEq TbEF 20 mEq daily. Via PEG-tube     ? QUEtiapine (SEROQUEL) 25 MG tablet 12.5 mg 3 (three) times a day. Via PEG-tube     ? ticagrelor (BRILINTA) 90 mg Tab 90 mg 2 (two) times a day. Via PEG-tube     ? traZODone (DESYREL) 50 MG tablet 50 mg at bedtime. Via PEG-tube     ? white petrolatum (AQUAPHOR ORIGINAL) 41 % Oint Apply 1 application topically 2 (two) times a day. Apply to feet/legs       No current facility-administered medications on file prior to visit.        Allergies     No Known  Allergies    Review of Systems   A comprehensive review of 14 systems was done. Pertinent findings noted here and in history of present illness. All the rest negative.  Constitutional: Negative.  Negative for fever, chills, he has activity change, appetite change and fatigue.   He has now been walking and taking a few steps.  He has been talking also  HENT: Negative for congestion and facial swelling.    Eyes: Negative for photophobia, redness and visual disturbance.   Respiratory: Negative for cough and chest tightness.    Cardiovascular: Negative for chest pain, palpitations and leg swelling.   Gastrointestinal: Negative for nausea, diarrhea, constipation, blood in stool and abdominal distention.   Genitourinary: Negative.    Musculoskeletal: Negative.  Hard to assess he does move his legs and arms spontaneously but not to command  Skin: Negative.    Neurological: Negative for dizziness, tremors, syncope, weakness, light-headedness and headaches.   Constant scissoring like movement of his legs and arms noted.  Significant excoriation of the right hip area noted  Pulling his tube feeding out  Nonresponsive  Hematological: Does not bruise/bleed easily.   Psychiatric/Behavioral: Negative.  Unable to assess as patient does not cooperate  However today noted to be talking spontaneously  Staff reporting that he is not sleeping well.  He has significant anxiety and frequently rolls out of bed      Physical Exam     Recent Vitals 8/26/2020   Weight 174 lbs   BP 96/52   Pulse 92   Temp 97.1   Temp src -   Some recent data might be hidden   Weight is 174 pounds blood pressure 100 /61 temp 98 pulse 80    Constitutional:  appears well-developed.   HEENT:  Normocephalic and atraumatic.  Eyes: Conjunctivae and EOM are normal. Pupils are equal, round, and reactive to light. No discharge.  No scleral icterus. Nose normal. Mouth/Throat: Oropharynx is clear and moist. No oropharyngeal exudate.    NECK: Normal range of motion. Neck  supple. No JVD present. No tracheal deviation present. No thyromegaly present.   CARDIOVASCULAR: Normal rate, regular rhythm and intact distal pulses.  Exam reveals no gallop and no friction rub.  Systolic murmur present.  PULMONARY: Effort normal and breath sounds normal. No respiratory distress.No Wheezing or rales.  ABDOMEN: Soft. Bowel sounds are normal. No distension and no mass.  There is no tenderness. There is no rebound and no guarding. No HSM.  Does have a G-tube which is somewhat sore around the insertion site  MUSCULOSKELETAL: Normal range of motion. No edema and no tenderness. Mild kyphosis, no tenderness.  Hard to assess as patient does not respond to verbal commands or follow them.  He seems to be spontaneously moving his arms and legs though  LYMPH NODES: Has no cervical, supraclavicular, axillary and groin adenopathy.   NEUROLOGICAL: Alert and oriented to NONE. No cranial nerve deficit.  Normal muscle tone. Coordination normal.   He does not make any eye contact.  Does not follow verbal commands either  GENITOURINARY: Deferred exam.  SKIN: Skin is warm and dry. No rash noted. No erythema. No pallor.   Excoriation of the skin in the abdomen noted  EXTREMITIES: No cyanosis, no clubbing, no edema. No Deformity.  PSYCHIATRIC: mood, affect and behavior is hard to assess because of noncommunication.      Lab Results     Hemoglobin 10.6.  White count was 11.5 with platelets of 336.  Electrolytes were normal.  Calcium 9.7 magnesium 2.2.  CR 0.76  4 days 119 AST 27 ALT 45  Recent Results (from the past 240 hour(s))   Basic Metabolic Panel   Result Value Ref Range    Sodium 141 136 - 145 mmol/L    Potassium 4.0 3.5 - 5.0 mmol/L    Chloride 104 98 - 107 mmol/L    CO2 25 22 - 31 mmol/L    Anion Gap, Calculation 12 5 - 18 mmol/L    Glucose 90 70 - 125 mg/dL    Calcium 9.5 8.5 - 10.5 mg/dL    BUN 12 8 - 22 mg/dL    Creatinine 0.81 0.70 - 1.30 mg/dL    GFR MDRD Af Amer >60 >60 mL/min/1.73m2    GFR MDRD Non Af Amer  >60 >60 mL/min/1.73m2       Imaging Results     Echocardiogram on 6/10/2020 shows LV ejection fraction of 36%.  LAD territory akinesis with right ventricular function chamber size wall movement and thickness are normal.    Coronary angiogram shows 100% stenosis of the proximal LAD lesion    CT of the head was negative    DARIO Vargas

## 2021-06-20 NOTE — LETTER
Letter by Fouzia High CNP at      Author: Fouzia High CNP Service: -- Author Type: --    Filed:  Encounter Date: 8/3/2020 Status: (Other)         Patient: Scot Bishop   MR Number: [id number]   YOB: 1980   Date of Visit: 8/3/2020       [facility]      NAME:  Scot Bishop             :  1980    MRN: [id number]    CODE STATUS:      FACILITY: [facility] [[id number]]       CHIEF COMPLAIN/REASON FOR VISIT:  Chief Complaint   Patient presents with   ? Review Of Multiple Medical Conditions     initiate care as new admit from acute care       HISTORY OF PRESENT ILLNESS: Scot Bishop is a 40 y.o. male being seen to initiate care on the TCU . He has no known medical past as per his EMR from [facility] prior to a NStemi in May 2020. At that juncture he was in cardiac cath lab and suffered a hypoxic brain injury, high temp and thrombotic event. He is seen in his room today. Non verbal and did not track with his eyes. He will make eye contact. He has repetitive movement while in bed. Moving legs and arms. Per nursing he  Slid from bed over week end, no apparent injuries. He will need ongoing rehab services with PT/OT/ST. Has Gtube de to his dysphagia.     No Known Allergies:     Current Outpatient Medications   Medication Sig   ? LORazepam (ATIVAN) 1 MG tablet Take 1 tablet (1 mg total) by mouth 3 (three) times a day as needed for anxiety.         REVIEW OF SYSTEMS:  Unable to verbalize due to aphasia    PHYSICAL EXAMINATION:  Vitals:    20 0639   BP: 98/66   Pulse: 97   Temp: 97.8  F (36.6  C)         GENERAL: Does not follow simple commands, makes eye contact, non verbal.  HEENT: Head is normocephalic with normal hair distribution. No evidence of trauma. Ears: No acute purulent discharge. Eyes: Conjunctivae pink with no scleral jaundice. Nose: Normal mucosa and septum. NECK: Supple with no cervical or supraclavicular lymphadenopathy.  Trachea is midline, healing trach stoma  CHEST: No tenderness or deformity, no crepitus  LUNG: Clear to auscultation with good chest expansion. There are no crackles or wheezes, normal AP diameter.  BACK: No kyphosis of the thoracic spine. Symmetric, no curvature, ROM normal, no CVA tenderness, no spinal tenderness   CVS: There is good S1  S2, rhythm is regular.  ABDOMEN: Globular and soft, nontender to palpation, non distended, no masses, no organomegaly, good bowel sounds, no rebound or guarding, no peritoneal signs. Gtube in place, wears an abdominal binder to prevent pulling  EXTREMITIES: Atraumatic. Full range of motion on both upper and lower extremities, there is no tenderness to palpation, no pedal edema, no cyanosis or clubbing, no calf tenderness, normal cap refill, no joint swelling.  SKIN: Warm and dry, no erythema noted, no rashes or lesions.  NEUROLOGICAL: The patient is oriented to person, place and time. Strength and sensation are grossly intact. Face is symmetric.          LABS:    Lab Results   Component Value Date    WBC 11.6 (H) 04/23/2015    HGB 15.4 04/23/2015    HCT 44.5 04/23/2015    MCV 91 04/23/2015     04/23/2015       Results for orders placed or performed during the hospital encounter of 04/23/15   Basic metabolic panel   Result Value Ref Range    Sodium 139 136 - 145 mmol/L    Potassium 3.9 3.5 - 5.0 mmol/L    Chloride 108 (H) 98 - 107 mmol/L    CO2 22 22 - 31 mmol/L    Anion Gap, Calculation 9 5 - 18 mmol/L    Glucose 94 70 - 125 mg/dL    Calcium 9.3 8.5 - 10.5 mg/dL    BUN 8 8 - 22 mg/dL    Creatinine 0.81 0.70 - 1.30 mg/dL    GFR MDRD Af Amer >60 >60 mL/min/1.73m2    GFR MDRD Non Af Amer >60 >60 mL/min/1.73m2           No results found for: HGBA1C  No results found for: [id number]  No results found for: [id number]    ASSESSMENT/PLAN:  1. Cardiac arrest (H)    2. Hypoxic brain injury (H)    3. Dysphagia, unspecified type      1. Cardiac arrest with hypoxic brain injury: Will  be in TCU for rehabilitation, PT/OT/SW/dITAARY TO FOLLOW. SWfor dc planning as well as SN for GTube management and chronic medical conditions.    3. Dysphagia: All nutrition via Gtube, all meds thru Gtube.  St to work with pt.      Electronically signed by:  Fouzia High CNP  This progress note was completed using Dragon software and there may be grammatical errors.      45  minutes spent of which greater than 60 % was face to face communication with the patient and RN on duty  about above plan of care including TCU routines and reviewing med regme

## 2021-06-20 NOTE — LETTER
Letter by Fouzia High CNP at      Author: Fouzia High CNP Service: -- Author Type: --    Filed:  Encounter Date: 2020 Status: (Other)         Patient: Scot Bishop   MR Number: [id number]   YOB: 1980   Date of Visit: 2020       [facility]      NAME:  Scot Bishop             :  1980    MRN: [id number]    CODE STATUS:      FACILITY: [facility] [[id number]]       CHIEF COMPLAIN/REASON FOR VISIT:  Chief Complaint   Patient presents with   ? Problem Visit     imentago       HISTORY OF PRESENT ILLNESS: Scot Bishop is a 40 y.o. male being seen per nursing request karen abdominal rash.  Dietary also changed to bolus feedings.  He has no known medical past as per his EMR from [facility] prior to a NStemi in May 2020. At that juncture he was in cardiac cath lab and suffered a hypoxic brain injury, high temp and thrombotic event. He is seen in his room today. Non verbal and did not track with his eyes. He will make eye contact. He has repetitive movement while in bed.He has repetitive laughing today. He varies between crying and laughing per nursing.  Moving legs and arms. Per nursing he  Slid from bed over week end, no apparent injuries, they are placing a scoop mattress for preventive falls. . He will need ongoing rehab services with PT/OT/ST. Has Gtube de to his dysphagia. Unfortunealy due to Scot Hypoxic brain injury he will not be able to understand hygiene education and nursing willneed to meet these needs.    No Known Allergies:     Current Outpatient Medications   Medication Sig   ? acetaminophen (TYLENOL) 650 mg/20.3 mL Soln 650 mg every 6 (six) hours as needed. Via PEG-tube    ? amantadine HCL (SYMMETREL) 50 mg/5 mL solution 100 mg every 12 (twelve) hours. Via PEG-tube   ? aspirin 81 MG EC tablet 81 mg daily. Via PEG-tube   ? atorvastatin (LIPITOR) 40 MG tablet 40 mg at bedtime. Via PEG-tube   ? bromocriptine (PARLODEL)  2.5 mg tablet 2.5 mg 2 (two) times a day. Via PEG-tube   ? chlorhexidine (PERIDEX) 0.12 % solution Apply 15 mL to the mouth or throat 4 (four) times a day.   ? furosemide (LASIX) 10 mg/mL solution 10 mg every other day. Via PEG-tube, hold if SBP <90   ? levETIRAcetam (KEPPRA) 100 mg/mL solution 1,000 mg 2 (two) times a day. Via PEG-tube   ? lisinopriL (PRINIVIL,ZESTRIL) 2.5 MG tablet 1.25 mg daily. Via PEG-tube, hold if SBP <90   ? LORazepam (ATIVAN) 0.5 MG tablet 1 tablet (0.5 mg total) by G-tube route 4 (four) times a day.   ? metoclopramide (REGLAN) 5 MG tablet 5 mg 2 (two) times a day. Via PEG-tube   ? metoprolol tartrate (LOPRESSOR) 25 MG tablet 6.25 mg 2 (two) times a day. Via PEG-tube, hold if SBP <90, HR <60   ? neomycin-bacitracin-polymyxin B (NEOSPORIN) 3.5-400-5,000 mg-unit-unit OiPk ointment Apply 1 application topically 2 (two) times a day.   ? omeprazole (PRILOSEC) 2 mg/mL SusR suspension 40 mg daily before breakfast. Via PEG-tube   ? pantoprazole (PROTONIX) 40 MG tablet 40 mg daily. Via PEG-tube   ? potassium bicarb-citric acid 20 mEq TbEF 20 mEq daily. Via PEG-tube   ? QUEtiapine (SEROQUEL) 25 MG tablet 12.5 mg 3 (three) times a day. Via PEG-tube   ? ticagrelor (BRILINTA) 90 mg Tab 90 mg 2 (two) times a day. Via PEG-tube   ? traZODone (DESYREL) 50 MG tablet 50 mg at bedtime. Via PEG-tube   ? white petrolatum (AQUAPHOR ORIGINAL) 41 % Oint Apply 1 application topically 2 (two) times a day. Apply to feet/legs         REVIEW OF SYSTEMS:  Unable to verbalize due to aphasia    PHYSICAL EXAMINATION:  Vitals:    08/22/20 1942   BP: 96/61   Pulse: 98   Temp: (!) 65  F (18.3  C)   Weight: 176 lb (79.8 kg)         GENERAL: Does not follow simple commands, makes eye contact, non verbal.  HEENT: Head is normocephalic with normal hair distribution. No evidence of trauma. Ears: No acute purulent discharge. Eyes: Conjunctivae pink with no scleral jaundice. Nose: Normal mucosa and septum. NECK: Supple with no  cervical or supraclavicular lymphadenopathy. Trachea is midline, healing trach stoma  EXTREMITIES: Atraumatic. Full range of motion on both upper and lower extremities, there is no tenderness to palpation, no pedal edema, no cyanosis or clubbing, no calf tenderness, normal cap refill, no joint swelling.  SKIN: Warm and dry, no erythema noted, abdomen with raw scratched irritated areas and pus like dried scabs  NEUROLOGICAL: The patient is oriented to person, TBI      LABS:    Lab Results   Component Value Date    WBC 8.3 08/06/2020    HGB 11.0 (L) 08/06/2020    HCT 34.9 (L) 08/06/2020    MCV 90 08/06/2020     08/06/2020       Results for orders placed or performed in visit on 08/18/20   Basic Metabolic Panel   Result Value Ref Range    Sodium 141 136 - 145 mmol/L    Potassium 4.0 3.5 - 5.0 mmol/L    Chloride 104 98 - 107 mmol/L    CO2 25 22 - 31 mmol/L    Anion Gap, Calculation 12 5 - 18 mmol/L    Glucose 90 70 - 125 mg/dL    Calcium 9.5 8.5 - 10.5 mg/dL    BUN 12 8 - 22 mg/dL    Creatinine 0.81 0.70 - 1.30 mg/dL    GFR MDRD Af Amer >60 >60 mL/min/1.73m2    GFR MDRD Non Af Amer >60 >60 mL/min/1.73m2           No results found for: HGBA1C  No results found for: [id number]  No results found for: [id number]    ASSESSMENT/PLAN:  1. Hypoxic brain injury (H)    2. Impetigo any site      1. Hypoxic brain injury: Will be in TCU for rehabilitation, PT/OT/ST  and dietary to follow SW for dc planning ,  SN to manage meds via tube and chronic medical conditons. Fall risk , a scoop mattress in place. Constant body movement noted.    2 . Impetigo: On badomen. Reviewed with DON, to wear binder with ABD , cleanse daily with bacatracin and Augmentin two times a day X 7 days to clear. Careful hygiene, keep nails short on pt for scratching.     Electronically signed by:  Fouzia High CNP  This progress note was completed using Dragon software and there may be grammatical errors.

## 2021-06-20 NOTE — LETTER
Letter by Fouzia High CNP at      Author: Fouzia High CNP Service: -- Author Type: --    Filed:  Encounter Date: 2020 Status: (Other)         Patient: Scot Bishop   MR Number: [id number]   YOB: 1980   Date of Visit: 2020       [facility]      NAME:  Scot Bishop             :  1980    MRN: [id number]    CODE STATUS:      FACILITY: [facility] (formerly [facility]3] [[id number]]       CHIEF COMPLAIN/REASON FOR VISIT:  Chief Complaint   Patient presents with   ? Review Of Multiple Medical Conditions     SKIN ISSUES       HISTORY OF PRESENT ILLNESS: Scot Bishop is a 40 y.o. male being seen per nursing request for yeast like rash to buttocks. Ths is noted to be improving with use of nystatin ointment. . Started on abx this week for impetigo.  He has no known medical past as per his EMR from [facility] prior to a NStemi in May 2020. At that juncture he was in cardiac cath lab and suffered a hypoxic brain injury, high temp and thrombotic event. He is seen in his room today. Non verbal and did not track with his eyes. He will make eye contact. He has repetitive movement while in bed.He has repetitive laughing today. He varies between crying and laughing per nursing.  Moving legs and arms. Per nursing he  Slid from bed over week end, no apparent injuries, they are placing a scoop mattress for preventive falls. . He will need ongoing rehab services with PT/OT/ST. Has Gtube de to his dysphagia. Unfortunealy due to Scot Hypoxic brain injury he will not be able to understand hygiene education and nursing will need to meet these needs for pt. Therapy continues to work with him, seen ambualting with SBA two.Staff to anticipate needs and provide good elizabeth care two times a day and prn    No Known Allergies:     Current Outpatient Medications   Medication Sig   ? acetaminophen (TYLENOL) 650 mg/20.3 mL Soln 650 mg every 6 (six) hours as needed. Via PEG-tube    ?  amantadine HCL (SYMMETREL) 50 mg/5 mL solution 100 mg every 12 (twelve) hours. Via PEG-tube   ? aspirin 81 MG EC tablet 81 mg daily. Via PEG-tube   ? atorvastatin (LIPITOR) 40 MG tablet 40 mg at bedtime. Via PEG-tube   ? bromocriptine (PARLODEL) 2.5 mg tablet 2.5 mg 2 (two) times a day. Via PEG-tube   ? chlorhexidine (PERIDEX) 0.12 % solution Apply 15 mL to the mouth or throat 4 (four) times a day.   ? furosemide (LASIX) 10 mg/mL solution 10 mg every other day. Via PEG-tube, hold if SBP <90   ? levETIRAcetam (KEPPRA) 100 mg/mL solution 1,000 mg 2 (two) times a day. Via PEG-tube   ? lisinopriL (PRINIVIL,ZESTRIL) 2.5 MG tablet 1.25 mg daily. Via PEG-tube, hold if SBP <90   ? LORazepam (ATIVAN) 0.5 MG tablet 1 tablet (0.5 mg total) by G-tube route 4 (four) times a day.   ? metoclopramide (REGLAN) 5 MG tablet 5 mg 2 (two) times a day. Via PEG-tube   ? metoprolol tartrate (LOPRESSOR) 25 MG tablet 6.25 mg 2 (two) times a day. Via PEG-tube, hold if SBP <90, HR <60   ? neomycin-bacitracin-polymyxin B (NEOSPORIN) 3.5-400-5,000 mg-unit-unit OiPk ointment Apply 1 application topically 2 (two) times a day.   ? omeprazole (PRILOSEC) 2 mg/mL SusR suspension 40 mg daily before breakfast. Via PEG-tube   ? pantoprazole (PROTONIX) 40 MG tablet 40 mg daily. Via PEG-tube   ? potassium bicarb-citric acid 20 mEq TbEF 20 mEq daily. Via PEG-tube   ? QUEtiapine (SEROQUEL) 25 MG tablet 12.5 mg 3 (three) times a day. Via PEG-tube   ? ticagrelor (BRILINTA) 90 mg Tab 90 mg 2 (two) times a day. Via PEG-tube   ? traZODone (DESYREL) 50 MG tablet 50 mg at bedtime. Via PEG-tube   ? white petrolatum (AQUAPHOR ORIGINAL) 41 % Oint Apply 1 application topically 2 (two) times a day. Apply to feet/legs         REVIEW OF SYSTEMS:  Unable to verbalize due to aphasia    PHYSICAL EXAMINATION:  Vitals:    09/03/20 1449   BP: 101/69   Pulse: 91   Temp: 98.9  F (37.2  C)   Weight: 164 lb 12.8 oz (74.8 kg)         GENERAL: Does not follow simple commands, makes  eye contact, non verbal.  HEENT: Head is normocephalic with normal hair distribution. No evidence of trauma. Ears: No acute purulent discharge. Eyes: Conjunctivae pink with no scleral jaundice. Nose: Normal mucosa and septum. NECK: Supple with no cervical or supraclavicular lymphadenopathy. Trachea is midline, healing trach stoma  EXTREMITIES: Atraumatic. Full range of motion on both upper and lower extremities, there is no tenderness to palpation, no pedal edema, no cyanosis or clubbing, no calf tenderness, normal cap refill, no joint swelling.  SKIN: Warm and dry, no erythema noted, abdomen with raw scratched irritated areas and pus like dried scabs, less erythema, improving.  NEUROLOGICAL: The patient is oriented to person, TBI      LABS:    Lab Results   Component Value Date    WBC 8.3 08/06/2020    HGB 11.0 (L) 08/06/2020    HCT 34.9 (L) 08/06/2020    MCV 90 08/06/2020     08/06/2020       Results for orders placed or performed in visit on 08/18/20   Basic Metabolic Panel   Result Value Ref Range    Sodium 141 136 - 145 mmol/L    Potassium 4.0 3.5 - 5.0 mmol/L    Chloride 104 98 - 107 mmol/L    CO2 25 22 - 31 mmol/L    Anion Gap, Calculation 12 5 - 18 mmol/L    Glucose 90 70 - 125 mg/dL    Calcium 9.5 8.5 - 10.5 mg/dL    BUN 12 8 - 22 mg/dL    Creatinine 0.81 0.70 - 1.30 mg/dL    GFR MDRD Af Amer >60 >60 mL/min/1.73m2    GFR MDRD Non Af Amer >60 >60 mL/min/1.73m2           No results found for: HGBA1C  No results found for: [id number]  No results found for: [id number]    ASSESSMENT/PLAN:  1. Hypoxic brain damage (H)    2. G tube feedings (H)    3. Yeast infection      1.Hypoxic brain injury: Continues to work with therapies, looking at increased therapy dc to [facility], a referral was sent awaiting acceptance.    2 .GtubeL Wt stable at 164.8 lb, occasional loose stools, is incont of both B and B.Now with bolus feedings vs continuous.    3.Yeast infection of  Buttocks: Add nystatin ointment on  buttocks along with barrier cream two times a day., improvement noted, continue good elizabeth care and off loading     Electronically signed by:  Fouzia High CNP  This progress note was completed using Dragon software and there may be grammatical errors.

## 2021-06-20 NOTE — LETTER
Letter by Cortney Bahena MBBS at      Author: Cortney Bahena MBBS Service: -- Author Type: --    Filed:  Encounter Date: 9/8/2020 Status: (Other)         Patient: Scot Bishop   MR Number: [id number]   YOB: 1980   Date of Visit: 9/8/2020       [facility]  note      Patient: Scot Bishop  MRN: [id number]  Date of Service: 9/8/2020      [facility] [[id number]]  Reason for Visit     Chief Complaint   Patient presents with   ? Review Of Multiple Medical Conditions   Follow-up on fever /low bp    Code Status     FULL CODE    Assessment     -Low-grade temperatures of unclear etiology  -Persistent hypotension low blood pressures noted-80/47  -Acute VF arrest secondary to acute ST SUKI  -Acute LAD occlusion with underlying history of coronary artery disease  -Hypoxic brain injury  - Severe dysphagia currently discharged on tube feeding; now with staff reporting patient constantly pulling tube feeding out  -Aspiration pneumonia currently resolved  -Acute hypoxic respiratory failure currently resolved  -History of lengthy stay in the hospital with multiple procedures including requiring intubation and ECMO support from 5 17-5 19 with tracheostomy and PEG placement.  -LOWELL currently resolved      Plan     Patient has been admitted to the TCU as a transfer from the hospital where he had a lengthy stay of more than 2 months.  He is on medical management.  He was seen at the request of nursing because of low blood pressure of 80/47.  Unfortunately no meaningful history could be obtained as he remains asymptomatic.  He was not given his metoprolol based on hold parameters and will continue to be monitored.  He continues to have frequent episodes of hypotension noted in the TCU I suspect this is part of his autonomic insufficiency with other parameters which are unstable to including frequent episodes of tachycardia and fever.  He is clinically asymptomatic.  He also today has low  blood pressures associated with temperature of 99 as well as tachycardia also suspect this is part of his autonomic instability.  We will monitor him clinically if there is a concern check labs for sepsis but clinically does not appear to be septic  Cognitively remains a poor historian with no meaningful recovery noted he is awaiting placement most likely in a group home  Nursing and dietary consultation requested to enhance his fluid intake however because of underlying history of CHF he needs to be monitored very closely    History     Patient is a very pleasant 40 y.o. male who is admitted to TCU  Patient has been admitted after a very lengthy more than 60-day stay in the hospital.  He presented following PEA/VF arrest in the community.  He was admitted and noted to have anterior acute ST SUKI.  He had multiple episodes of cardiac arrest  He required ECMO support and was emergently taken for cardiac catheterization which showed a thrombotic occlusion of the LAD which was treated with aspiration thrombectomy and PCI with drug-eluting stent of the proximal LAD.  He has been discharged on medical management.  He was evaluated at the request of nursing because of very low blood pressures  Check was 80/47.  Unfortunately he continues to have very unstable vitals with tachycardia and hypotension noted nursing was advised to hold his metoprolol as per parameters which they have been doing.      He also suffered from hypoxic brain injury.  Initial CT of the head was negative but EEG shows severe diffuse encephalopathy without seizure or epilepsy.  Repeat CT did show multifocal acute/subacute cerebral infarcts.  And brain injury which is consistent with hypoxic brain injury.  No improvement noted in the TCU he continues to be pretty nonresponsive and nonverbal.    No meaningful recovery has been noted however he has had some improvement and is occasionally noted to be ambulating with improvement but remains bedbound  remains a very high fall risk he will require a group home placement at discharge    He remains on tube feeding.  Recently has had a lot of difficulties with tube feeding.  He was sent him back in the hospital for G-tube replacement.  Due to his repeated difficulty with G-tube he has been switched from 24/7 feeding to bolus feeding during the daytime which has worked much better.  All his meds are given via tube as he does not swallow well either  Unfortunately no improvement noted in his dysphagia he is not going to be a candidate for eating orally at all all his nutritional needs will have to be met through the tube    He has had multiple falls in the TCU.  Placement efforts have been unsuccessful and finally has been transferred to [facility] on the floor.  Unfortunately remains hard to care for in light of his profound cognitiv impairment.  He continues to have significant autonomic instability with frequent low temperatures and high temperatures noted.  He has been afebrile recently but occasionally has had high blood pressures also.  He is asymptomatic          Past Medical History     Active Ambulatory (Non-Hospital) Problems    Diagnosis   ? G tube feedings (H)   ? Yeast infection   ? Impetigo any site   ? Hypoxic brain damage (H)   ? Acute respiratory failure with hypoxia (H)   ? Gastrojejunostomy tube status (H)   ? Hypoxic brain injury (H)   ? Dysphagia   ? Cardiac arrest (H)   ? Primary Lymphadenitis   ? Nicotine Dependence   ? Backache     Past Medical History:   Diagnosis Date   ? Acute respiratory failure with hypoxia (H)    ? LOWELL (acute kidney injury) (H)    ? Cardiac arrest (H)    ? Dysphagia    ? HTN (hypertension)    ? Hypoxic ischemic encephalopathy    ? NSTEMI (non-ST elevated myocardial infarction) (H)        Past Social History     Reviewed, and he  reports that he has been smoking. He has never used smokeless tobacco. He reports current alcohol use. He reports that he does not use  drugs.    Family History     Reviewed, and family history includes Diabetes in his maternal aunt; Lung cancer in his maternal grandfather; Other in his father.    Medication List   Post Discharge Medication Reconciliation Status: discharge medications reconciled, continue medications without change   Current Outpatient Medications on File Prior to Visit   Medication Sig Dispense Refill   ? acetaminophen (TYLENOL) 650 mg/20.3 mL Soln 650 mg every 6 (six) hours as needed. Via PEG-tube      ? amantadine HCL (SYMMETREL) 50 mg/5 mL solution 100 mg every 12 (twelve) hours. Via PEG-tube     ? aspirin 81 MG EC tablet 81 mg daily. Via PEG-tube     ? atorvastatin (LIPITOR) 40 MG tablet 40 mg at bedtime. Via PEG-tube     ? bromocriptine (PARLODEL) 2.5 mg tablet 2.5 mg 2 (two) times a day. Via PEG-tube     ? chlorhexidine (PERIDEX) 0.12 % solution Apply 15 mL to the mouth or throat 4 (four) times a day.     ? furosemide (LASIX) 10 mg/mL solution 10 mg every other day. Via PEG-tube, hold if SBP <90     ? levETIRAcetam (KEPPRA) 100 mg/mL solution 1,000 mg 2 (two) times a day. Via PEG-tube     ? lisinopriL (PRINIVIL,ZESTRIL) 2.5 MG tablet 1.25 mg daily. Via PEG-tube, hold if SBP <90     ? LORazepam (ATIVAN) 0.5 MG tablet 1 tablet (0.5 mg total) by G-tube route 4 (four) times a day. 120 tablet 0   ? metoclopramide (REGLAN) 5 MG tablet 5 mg 2 (two) times a day. Via PEG-tube     ? metoprolol tartrate (LOPRESSOR) 25 MG tablet 6.25 mg 2 (two) times a day. Via PEG-tube, hold if SBP <90, HR <60     ? neomycin-bacitracin-polymyxin B (NEOSPORIN) 3.5-400-5,000 mg-unit-unit OiPk ointment Apply 1 application topically 2 (two) times a day.     ? omeprazole (PRILOSEC) 2 mg/mL SusR suspension 40 mg daily before breakfast. Via PEG-tube     ? pantoprazole (PROTONIX) 40 MG tablet 40 mg daily. Via PEG-tube     ? potassium bicarb-citric acid 20 mEq TbEF 20 mEq daily. Via PEG-tube     ? QUEtiapine (SEROQUEL) 25 MG tablet 12.5 mg 3 (three) times a day.  Via PEG-tube     ? ticagrelor (BRILINTA) 90 mg Tab 90 mg 2 (two) times a day. Via PEG-tube     ? traZODone (DESYREL) 50 MG tablet 50 mg at bedtime. Via PEG-tube     ? white petrolatum (AQUAPHOR ORIGINAL) 41 % Oint Apply 1 application topically 2 (two) times a day. Apply to feet/legs       No current facility-administered medications on file prior to visit.        Allergies     No Known Allergies    Review of Systems   A comprehensive review of 14 systems was done. Pertinent findings noted here and in history of present illness. All the rest negative.  Constitutional: Negative.  Negative for fever, chills, he has activity change, appetite change and fatigue.   He has now been walking and taking a few steps.  He has been talking also  HENT: Negative for congestion and facial swelling.    Eyes: Negative for photophobia, redness and visual disturbance.   Respiratory: Negative for cough and chest tightness.    Cardiovascular: Negative for chest pain, palpitations and leg swelling.   Gastrointestinal: Negative for nausea, diarrhea, constipation, blood in stool and abdominal distention.   Genitourinary: Negative.    Musculoskeletal: Negative.  Hard to assess he does move his legs and arms spontaneously but not to command  Skin: Negative.    Neurological: Negative for dizziness, tremors, syncope, weakness, light-headedness and headaches.   Constant scissoring like movement of his legs and arms noted.  Significant excoriation of the right hip area noted  Pulling his tube feeding out  Nonresponsive  Hematological: Does not bruise/bleed easily.   Psychiatric/Behavioral: Negative.  Unable to assess as patient does not cooperate  However today noted to be talking spontaneously  Staff reporting that he is not sleeping well.  He has significant anxiety and frequently rolls out of bed      Physical Exam     Recent Vitals 9/3/2020   Weight 164 lbs 13 oz   /69   Pulse 91   Temp 98.9   Some recent data might be hidden   Weight  is 168 pounds blood pressure 80/46 temp 99  pulse 105    Constitutional:  appears well-developed.   HEENT:  Normocephalic and atraumatic.  Eyes: Conjunctivae and EOM are normal. Pupils are equal, round, and reactive to light. No discharge.  No scleral icterus. Nose normal. Mouth/Throat: Oropharynx is clear and moist. No oropharyngeal exudate.    NECK: Normal range of motion. Neck supple. No JVD present. No tracheal deviation present. No thyromegaly present.   CARDIOVASCULAR: Normal rate, regular rhythm and intact distal pulses.  Exam reveals no gallop and no friction rub.  Systolic murmur present.  PULMONARY: Effort normal and breath sounds normal. No respiratory distress.No Wheezing or rales.  ABDOMEN: Soft. Bowel sounds are normal. No distension and no mass.  There is no tenderness. There is no rebound and no guarding. No HSM.  Does have a G-tube which is somewhat sore around the insertion site  MUSCULOSKELETAL: Normal range of motion. No edema and no tenderness. Mild kyphosis, no tenderness.  Hard to assess as patient does not respond to verbal commands or follow them.  He seems to be spontaneously moving his arms and legs though  LYMPH NODES: Has no cervical, supraclavicular, axillary and groin adenopathy.   NEUROLOGICAL: Alert and oriented to NONE. No cranial nerve deficit.  Normal muscle tone. Coordination normal.   He does not make any eye contact.  Does not follow verbal commands either  GENITOURINARY: Deferred exam.  SKIN: Skin is warm and dry. No rash noted. No erythema. No pallor.   Excoriation of the skin in the abdomen noted  EXTREMITIES: No cyanosis, no clubbing, no edema. No Deformity.  PSYCHIATRIC: mood, affect and behavior is hard to assess because of noncommunication.      Lab Results     Hemoglobin 10.6.  White count was 11.5 with platelets of 336.  Electrolytes were normal.  Calcium 9.7 magnesium 2.2.  CR 0.76  4 days 119 AST 27 ALT 45      Imaging Results     Echocardiogram on 6/10/2020 shows LV  ejection fraction of 36%.  LAD territory akinesis with right ventricular function chamber size wall movement and thickness are normal.    Coronary angiogram shows 100% stenosis of the proximal LAD lesion    CT of the head was negative    DARIO Vargas

## 2021-06-20 NOTE — LETTER
Letter by Fouzia High CNP at      Author: Fouzia High CNP Service: -- Author Type: --    Filed:  Encounter Date: 2020 Status: (Other)         Patient: Scot Bishop   MR Number: [id number]   YOB: 1980   Date of Visit: 2020       [facility]      NAME:  Scot Bishop             :  1980    MRN: [id number]    CODE STATUS:      FACILITY: [facility] [[id number]]       CHIEF COMPLAIN/REASON FOR VISIT:  Chief Complaint   Patient presents with   ? Review Of Multiple Medical Conditions     gtube problems       HISTORY OF PRESENT ILLNESS: Scot Bishop is a 40 y.o. male being seen per nursing request for his GTube malfunction.He will need to go to  for check of tube, possible new tube. Dietary also changed to bolus feedings.  He has no known medical past as per his EMR from [facility] prior to a NStemi in May 2020. At that juncture he was in cardiac cath lab and suffered a hypoxic brain injury, high temp and thrombotic event. He is seen in his room today. Non verbal and did not track with his eyes. He will make eye contact. He has repetitive movement while in bed.He has repetitive laughing today. He varies between crying and laughing per nursing.  Moving legs and arms. Per nursing he  Slid from bed over week end, no apparent injuries, they are placing a scoop mattress for preventive falls. . He will need ongoing rehab services with PT/OT/ST. Has Gtube de to his dysphagia    No Known Allergies:     Current Outpatient Medications   Medication Sig   ? acetaminophen (TYLENOL) 650 mg/20.3 mL Soln 650 mg every 6 (six) hours as needed. Via PEG-tube    ? amantadine HCL (SYMMETREL) 50 mg/5 mL solution 100 mg every 12 (twelve) hours. Via PEG-tube   ? aspirin 81 MG EC tablet 81 mg daily. Via PEG-tube   ? atorvastatin (LIPITOR) 40 MG tablet 40 mg at bedtime. Via PEG-tube   ? bromocriptine (PARLODEL) 2.5 mg tablet 2.5 mg 2 (two) times a day. Via  PEG-tube   ? chlorhexidine (PERIDEX) 0.12 % solution Apply 15 mL to the mouth or throat 4 (four) times a day.   ? furosemide (LASIX) 10 mg/mL solution 10 mg every other day. Via PEG-tube, hold if SBP <90   ? levETIRAcetam (KEPPRA) 100 mg/mL solution 1,000 mg 2 (two) times a day. Via PEG-tube   ? lisinopriL (PRINIVIL,ZESTRIL) 2.5 MG tablet 1.25 mg daily. Via PEG-tube, hold if SBP <90   ? LORazepam (ATIVAN) 0.5 MG tablet 1 tablet (0.5 mg total) by G-tube route 4 (four) times a day.   ? metoclopramide (REGLAN) 5 MG tablet 5 mg 2 (two) times a day. Via PEG-tube   ? metoprolol tartrate (LOPRESSOR) 25 MG tablet 6.25 mg 2 (two) times a day. Via PEG-tube, hold if SBP <90, HR <60   ? neomycin-bacitracin-polymyxin B (NEOSPORIN) 3.5-400-5,000 mg-unit-unit OiPk ointment Apply 1 application topically 2 (two) times a day.   ? omeprazole (PRILOSEC) 2 mg/mL SusR suspension 40 mg daily before breakfast. Via PEG-tube   ? pantoprazole (PROTONIX) 40 MG tablet 40 mg daily. Via PEG-tube   ? potassium bicarb-citric acid 20 mEq TbEF 20 mEq daily. Via PEG-tube   ? QUEtiapine (SEROQUEL) 25 MG tablet 12.5 mg 3 (three) times a day. Via PEG-tube   ? ticagrelor (BRILINTA) 90 mg Tab 90 mg 2 (two) times a day. Via PEG-tube   ? traZODone (DESYREL) 50 MG tablet 50 mg at bedtime. Via PEG-tube   ? white petrolatum (AQUAPHOR ORIGINAL) 41 % Oint Apply 1 application topically 2 (two) times a day. Apply to feet/legs         REVIEW OF SYSTEMS:  Unable to verbalize due to aphasia    PHYSICAL EXAMINATION:  Vitals:    08/20/20 1451   BP: 101/65   Pulse: (!) 115   Temp: 98.5  F (36.9  C)   Weight: 173 lb 6.4 oz (78.7 kg)         GENERAL: Does not follow simple commands, makes eye contact, non verbal.  HEENT: Head is normocephalic with normal hair distribution. No evidence of trauma. Ears: No acute purulent discharge. Eyes: Conjunctivae pink with no scleral jaundice. Nose: Normal mucosa and septum. NECK: Supple with no cervical or supraclavicular  lymphadenopathy. Trachea is midline, healing trach stoma  EXTREMITIES: Atraumatic. Full range of motion on both upper and lower extremities, there is no tenderness to palpation, no pedal edema, no cyanosis or clubbing, no calf tenderness, normal cap refill, no joint swelling.  SKIN: Warm and dry, no erythema noted, no rashes or lesions.  NEUROLOGICAL: The patient is oriented to person, TBI      LABS:    Lab Results   Component Value Date    WBC 8.3 08/06/2020    HGB 11.0 (L) 08/06/2020    HCT 34.9 (L) 08/06/2020    MCV 90 08/06/2020     08/06/2020       Results for orders placed or performed in visit on 08/18/20   Basic Metabolic Panel   Result Value Ref Range    Sodium 141 136 - 145 mmol/L    Potassium 4.0 3.5 - 5.0 mmol/L    Chloride 104 98 - 107 mmol/L    CO2 25 22 - 31 mmol/L    Anion Gap, Calculation 12 5 - 18 mmol/L    Glucose 90 70 - 125 mg/dL    Calcium 9.5 8.5 - 10.5 mg/dL    BUN 12 8 - 22 mg/dL    Creatinine 0.81 0.70 - 1.30 mg/dL    GFR MDRD Af Amer >60 >60 mL/min/1.73m2    GFR MDRD Non Af Amer >60 >60 mL/min/1.73m2           No results found for: HGBA1C  No results found for: [id number]  No results found for: [id number]    ASSESSMENT/PLAN:  1. Gastrojejunostomy tube status (H)    2. Hypoxic brain damage (H)      1. Hypoxic brain injury: Will be in TCU for rehabilitation, PT/OT/ST  and dietary to follow SW for dc planning ,  SN to manage meds via tube and chronic medical conditons. Fall risk , a scoop mattress in place. Constant body movement noted.    2 . Dysphagia and GTUBE: Nursing reports ongoing leakage, he is to go to IR for a GT check. He has been changed to boulus feedings and appropriate.          Electronically signed by:  Fouzia High CNP  This progress note was completed using Dragon software and there may be grammatical errors.

## 2021-06-20 NOTE — LETTER
Letter by Fouzia High CNP at      Author: Fouzia High CNP Service: -- Author Type: --    Filed:  Encounter Date: 10/5/2020 Status: (Other)         Patient: Scot Bishop   MR Number: [id number]   YOB: 1980   Date of Visit: 10/5/2020       [facility]      NAME:  Scot Bishop             :  1980    MRN: [id number]    CODE STATUS:      FACILITY: [facility] [[id number]]       CHIEF COMPLAIN/REASON FOR VISIT:  Chief Complaint   Patient presents with   ? Review Of Multiple Medical Conditions     rehab progress       HISTORY OF PRESENT ILLNESS: Scot Bishop is a 40 y.o. male being seen for review of multiple medical conditions . He did poorly on his recent swallow study at  due to his brain injury, agitation and short attention span..   [facility] to a [facility] in May 2020. At that juncture he was in cardiac cath lab and suffered a hypoxic brain injury, high temp and thrombotic event. He is seen in his room today. Non verbal and did not track with his eyes. He will make eye contact. He has repetitive movement while in bed.He has repetitive laughing today. He varies between crying and laughing per nursing.  Moving legs and arms. Per nursing he  Slid from bed over week end, no apparent injuries, they are placing a scoop mattress for preventive falls. . He will need ongoing rehab services with PT/OT/ST. Has Gtube due to his dysphagia, which reviewed that weight has been stable.. Unfortunealy due to Scot Hypoxic brain injury he will not be able to understand hygiene education and nursing will need to meet these needs for pt. Therapy continues to work with him, seen ambualting with SBA two.Staff to anticipate needs and provide good elizabeth care two times a day and prn.   continues to work with family towards DC to a group home after TCU stay as patient will be unable to care for self, remains dependant on staff  for all ADL and mobility as well as GTube for feedings.Wt stable at 168.2. Pt does continue with rehab services.BMP to be drawn today.   No Known Allergies:     Current Outpatient Medications   Medication Sig   ? acetaminophen (TYLENOL) 650 mg/20.3 mL Soln 650 mg every 6 (six) hours as needed. Via PEG-tube    ? amantadine HCL (SYMMETREL) 50 mg/5 mL solution 100 mg every 12 (twelve) hours. Via PEG-tube   ? aspirin 81 MG EC tablet 81 mg daily. Via PEG-tube   ? atorvastatin (LIPITOR) 40 MG tablet 40 mg at bedtime. Via PEG-tube   ? bromocriptine (PARLODEL) 2.5 mg tablet 2.5 mg 2 (two) times a day. Via PEG-tube   ? chlorhexidine (PERIDEX) 0.12 % solution Apply 15 mL to the mouth or throat 4 (four) times a day.   ? levETIRAcetam (KEPPRA) 100 mg/mL solution 1,000 mg 2 (two) times a day. Via PEG-tube   ? lisinopriL (PRINIVIL,ZESTRIL) 2.5 MG tablet 1.25 mg daily. Via PEG-tube, hold if SBP <90   ? LORazepam (ATIVAN) 0.5 MG tablet 1 tablet (0.5 mg total) by G-tube route 4 (four) times a day.   ? metoclopramide (REGLAN) 5 MG tablet 5 mg 2 (two) times a day. Via PEG-tube   ? metoprolol tartrate (LOPRESSOR) 25 MG tablet 6.25 mg 2 (two) times a day. Via PEG-tube, hold if SBP <90, HR <60   ? neomycin-bacitracin-polymyxin B (NEOSPORIN) 3.5-400-5,000 mg-unit-unit OiPk ointment Apply 1 application topically 2 (two) times a day.   ? omeprazole (PRILOSEC) 2 mg/mL SusR suspension 40 mg daily before breakfast. Via PEG-tube   ? pantoprazole (PROTONIX) 40 MG tablet 40 mg daily. Via PEG-tube   ? potassium bicarb-citric acid 20 mEq TbEF 20 mEq daily. Via PEG-tube   ? QUEtiapine (SEROQUEL) 25 MG tablet 12.5 mg 3 (three) times a day. Via PEG-tube   ? ticagrelor (BRILINTA) 90 mg Tab 90 mg 2 (two) times a day. Via PEG-tube   ? traZODone (DESYREL) 50 MG tablet 50 mg at bedtime. Via PEG-tube   ? white petrolatum (AQUAPHOR ORIGINAL) 41 % Oint Apply 1 application topically 2 (two) times a day. Apply to feet/legs         REVIEW OF SYSTEMS:  Unable to  verbalize due to aphasia    PHYSICAL EXAMINATION:  Vitals:    10/05/20 1954   BP: 106/74   Pulse: 95   Temp: 98  F (36.7  C)   Weight: 168 lb 3.2 oz (76.3 kg)         GENERAL: Does not follow simple commands, makes eye contact, non verbal.  HEENT: Head is normocephalic with normal hair distribution. No evidence of trauma. Ears: No acute purulent discharge. Eyes: Conjunctivae pink with no scleral jaundice. Nose: Normal mucosa and septum. NECK: Supple with no cervical or supraclavicular lymphadenopathy. Trachea is midline, healing trach stoma  EXTREMITIES: Atraumatic. Full range of motion on both upper and lower extremities, there is no tenderness to palpation, no pedal edema, no cyanosis or clubbing, no calf tenderness, normal cap refill, no joint swelling.  SKIN: Warm and dry, no erythema noted, abdomen with g tube  NEUROLOGICAL: The patient is oriented to person, TBI  Social Distancing with PPE practiced due to Covid 19    LABS:    Lab Results   Component Value Date    WBC 8.3 08/06/2020    HGB 11.0 (L) 08/06/2020    HCT 34.9 (L) 08/06/2020    MCV 90 08/06/2020     08/06/2020       Results for orders placed or performed in visit on 10/05/20   Basic Metabolic Panel   Result Value Ref Range    Sodium 141 136 - 145 mmol/L    Potassium 4.2 3.5 - 5.0 mmol/L    Chloride 104 98 - 107 mmol/L    CO2 26 22 - 31 mmol/L    Anion Gap, Calculation 11 5 - 18 mmol/L    Glucose 106 70 - 125 mg/dL    Calcium 9.7 8.5 - 10.5 mg/dL    BUN 13 8 - 22 mg/dL    Creatinine 0.83 0.70 - 1.30 mg/dL    GFR MDRD Af Amer >60 >60 mL/min/1.73m2    GFR MDRD Non Af Amer >60 >60 mL/min/1.73m2           No results found for: HGBA1C  No results found for: [id number]  No results found for: [id number]    ASSESSMENT/PLAN:  1. Dysphagia, unspecified type    2. Hypoxic brain damage (H)      1.. Dysphagia: Spoke to ST and plan is to start soft food feedings. Remains on [facility] eneteral for nutrition, night feedings, all meds vis gtube. Nursing will  need strict VS and Lung assessment QS and we will stopp in the event he does not tolerate well.    2. Hypoxic brain injury: Continues with therapies, IDT to review update tomorrow. DC planning ongoing, more info on 10/6/20.     Electronically signed by:  Fouzia High CNP  This progress note was completed using Dragon software and there may be grammatical errors.

## 2021-06-20 NOTE — LETTER
Letter by Fouzia High CNP at      Author: Fouzia High CNP Service: -- Author Type: --    Filed:  Encounter Date: 2020 Status: (Other)         Patient: Scot Bishop   MR Number: [id number]   YOB: 1980   Date of Visit: 2020       [facility]      NAME:  Scot Bishop             :  1980    MRN: [id number]    CODE STATUS:      FACILITY: [facility] [[id number]]       CHIEF COMPLAIN/REASON FOR VISIT:  Chief Complaint   Patient presents with   ? Problem Visit     impetigo       HISTORY OF PRESENT ILLNESS: Scot Bishop is a 40 y.o. male being seen per nursing request for abdominal skin impairment. Started on abx this week for impetigo. .  Dietary also changed to bolus feedings.  He has no known medical past as per his EMR from [facility] prior to a NStemi in May 2020. At that juncture he was in cardiac cath lab and suffered a hypoxic brain injury, high temp and thrombotic event. He is seen in his room today. Non verbal and did not track with his eyes. He will make eye contact. He has repetitive movement while in bed.He has repetitive laughing today. He varies between crying and laughing per nursing.  Moving legs and arms. Per nursing he  Slid from bed over week end, no apparent injuries, they are placing a scoop mattress for preventive falls. . He will need ongoing rehab services with PT/OT/ST. Has Gtube de to his dysphagia. Unfortunealy due to Scot Hypoxic brain injury he will not be able to understand hygiene education and nursing will need to meet these needs for pt.    No Known Allergies:     Current Outpatient Medications   Medication Sig   ? acetaminophen (TYLENOL) 650 mg/20.3 mL Soln 650 mg every 6 (six) hours as needed. Via PEG-tube    ? amantadine HCL (SYMMETREL) 50 mg/5 mL solution 100 mg every 12 (twelve) hours. Via PEG-tube   ? aspirin 81 MG EC tablet 81 mg daily. Via PEG-tube   ? atorvastatin (LIPITOR) 40 MG tablet 40  mg at bedtime. Via PEG-tube   ? bromocriptine (PARLODEL) 2.5 mg tablet 2.5 mg 2 (two) times a day. Via PEG-tube   ? chlorhexidine (PERIDEX) 0.12 % solution Apply 15 mL to the mouth or throat 4 (four) times a day.   ? furosemide (LASIX) 10 mg/mL solution 10 mg every other day. Via PEG-tube, hold if SBP <90   ? levETIRAcetam (KEPPRA) 100 mg/mL solution 1,000 mg 2 (two) times a day. Via PEG-tube   ? lisinopriL (PRINIVIL,ZESTRIL) 2.5 MG tablet 1.25 mg daily. Via PEG-tube, hold if SBP <90   ? LORazepam (ATIVAN) 0.5 MG tablet 1 tablet (0.5 mg total) by G-tube route 4 (four) times a day.   ? metoclopramide (REGLAN) 5 MG tablet 5 mg 2 (two) times a day. Via PEG-tube   ? metoprolol tartrate (LOPRESSOR) 25 MG tablet 6.25 mg 2 (two) times a day. Via PEG-tube, hold if SBP <90, HR <60   ? neomycin-bacitracin-polymyxin B (NEOSPORIN) 3.5-400-5,000 mg-unit-unit OiPk ointment Apply 1 application topically 2 (two) times a day.   ? omeprazole (PRILOSEC) 2 mg/mL SusR suspension 40 mg daily before breakfast. Via PEG-tube   ? pantoprazole (PROTONIX) 40 MG tablet 40 mg daily. Via PEG-tube   ? potassium bicarb-citric acid 20 mEq TbEF 20 mEq daily. Via PEG-tube   ? QUEtiapine (SEROQUEL) 25 MG tablet 12.5 mg 3 (three) times a day. Via PEG-tube   ? ticagrelor (BRILINTA) 90 mg Tab 90 mg 2 (two) times a day. Via PEG-tube   ? traZODone (DESYREL) 50 MG tablet 50 mg at bedtime. Via PEG-tube   ? white petrolatum (AQUAPHOR ORIGINAL) 41 % Oint Apply 1 application topically 2 (two) times a day. Apply to feet/legs         REVIEW OF SYSTEMS:  Unable to verbalize due to aphasia    PHYSICAL EXAMINATION:  Vitals:    08/26/20 0504   BP: 96/52   Pulse: 92   Temp: 97.1  F (36.2  C)   Weight: 174 lb (78.9 kg)         GENERAL: Does not follow simple commands, makes eye contact, non verbal.  HEENT: Head is normocephalic with normal hair distribution. No evidence of trauma. Ears: No acute purulent discharge. Eyes: Conjunctivae pink with no scleral jaundice. Nose:  Normal mucosa and septum. NECK: Supple with no cervical or supraclavicular lymphadenopathy. Trachea is midline, healing trach stoma  EXTREMITIES: Atraumatic. Full range of motion on both upper and lower extremities, there is no tenderness to palpation, no pedal edema, no cyanosis or clubbing, no calf tenderness, normal cap refill, no joint swelling.  SKIN: Warm and dry, no erythema noted, abdomen with raw scratched irritated areas and pus like dried scabs, less erythema, improving.  NEUROLOGICAL: The patient is oriented to person, TBI      LABS:    Lab Results   Component Value Date    WBC 8.3 08/06/2020    HGB 11.0 (L) 08/06/2020    HCT 34.9 (L) 08/06/2020    MCV 90 08/06/2020     08/06/2020       Results for orders placed or performed in visit on 08/18/20   Basic Metabolic Panel   Result Value Ref Range    Sodium 141 136 - 145 mmol/L    Potassium 4.0 3.5 - 5.0 mmol/L    Chloride 104 98 - 107 mmol/L    CO2 25 22 - 31 mmol/L    Anion Gap, Calculation 12 5 - 18 mmol/L    Glucose 90 70 - 125 mg/dL    Calcium 9.5 8.5 - 10.5 mg/dL    BUN 12 8 - 22 mg/dL    Creatinine 0.81 0.70 - 1.30 mg/dL    GFR MDRD Af Amer >60 >60 mL/min/1.73m2    GFR MDRD Non Af Amer >60 >60 mL/min/1.73m2           No results found for: HGBA1C  No results found for: [id number]  No results found for: ALQBYSAC44    ASSESSMENT/PLAN:  1. Gastrojejunostomy tube status (H)    2. Impetigo any site      1. GT:  Status, Was changed with IR, no reported leaking or issues, infusing well. He does have feedings bolus vs continuous.   2 . Impetigo: Current treatment of augmentin per GT and bacatracin topical has demonstrated effective and skin impairment improving. Encourage binder on to protect skin.   Electronically signed by:  Fouzia High CNP  This progress note was completed using Dragon software and there may be grammatical errors.

## 2021-06-20 NOTE — LETTER
Letter by Fouzia High CNP at      Author: Fouzia High CNP Service: -- Author Type: --    Filed:  Encounter Date: 2020 Status: (Other)         Patient: Scot Bishop   MR Number: [id number]   YOB: 1980   Date of Visit: 2020       [facility]      NAME:  Scot Bishop             :  1980    MRN: [id number]    CODE STATUS:      FACILITY: [facility] [[id number]]       CHIEF COMPLAIN/REASON FOR VISIT:  Chief Complaint   Patient presents with   ? Review Of Multiple Medical Conditions     dysphagia       HISTORY OF PRESENT ILLNESS: Scot Bishop is a 40 y.o. male being seen for per ST request due to his dysphagia. She would like to see if we could get a BS swallow eval.   [facility] in May 2020. At that juncture he was in cardiac cath lab and suffered a hypoxic brain injury, high temp and thrombotic event. He is seen in his room today. Non verbal and did not track with his eyes. He will make eye contact. He has repetitive movement while in bed.He has repetitive laughing today. He varies between crying and laughing per nursing.  Moving legs and arms. Per nursing he  Slid from bed over week end, no apparent injuries, they are placing a scoop mattress for preventive falls. . He will need ongoing rehab services with PT/OT/ST. Has Gtube due to his dysphagia, which reviewed that weight has been stable.. Unfortunealy due to Scot Hypoxic brain injury he will not be able to understand hygiene education and nursing will need to meet these needs for pt. Therapy continues to work with him, seen ambualting with SBA two.Staff to anticipate needs and provide good elizabeth care two times a day and prn.   continues to work with family towards DC to a group home after TCU stay as patient will be unable to care for self, remains dependant on staff for all ADL and mobility as well as GTube for feedings.. Pt  does continue with rehab services    No Known Allergies:     Current Outpatient Medications   Medication Sig   ? acetaminophen (TYLENOL) 650 mg/20.3 mL Soln 650 mg every 6 (six) hours as needed. Via PEG-tube    ? amantadine HCL (SYMMETREL) 50 mg/5 mL solution 100 mg every 12 (twelve) hours. Via PEG-tube   ? aspirin 81 MG EC tablet 81 mg daily. Via PEG-tube   ? atorvastatin (LIPITOR) 40 MG tablet 40 mg at bedtime. Via PEG-tube   ? bromocriptine (PARLODEL) 2.5 mg tablet 2.5 mg 2 (two) times a day. Via PEG-tube   ? chlorhexidine (PERIDEX) 0.12 % solution Apply 15 mL to the mouth or throat 4 (four) times a day.   ? levETIRAcetam (KEPPRA) 100 mg/mL solution 1,000 mg 2 (two) times a day. Via PEG-tube   ? lisinopriL (PRINIVIL,ZESTRIL) 2.5 MG tablet 1.25 mg daily. Via PEG-tube, hold if SBP <90   ? LORazepam (ATIVAN) 0.5 MG tablet 1 tablet (0.5 mg total) by G-tube route 4 (four) times a day.   ? metoclopramide (REGLAN) 5 MG tablet 5 mg 2 (two) times a day. Via PEG-tube   ? metoprolol tartrate (LOPRESSOR) 25 MG tablet 6.25 mg 2 (two) times a day. Via PEG-tube, hold if SBP <90, HR <60   ? neomycin-bacitracin-polymyxin B (NEOSPORIN) 3.5-400-5,000 mg-unit-unit OiPk ointment Apply 1 application topically 2 (two) times a day.   ? omeprazole (PRILOSEC) 2 mg/mL SusR suspension 40 mg daily before breakfast. Via PEG-tube   ? pantoprazole (PROTONIX) 40 MG tablet 40 mg daily. Via PEG-tube   ? potassium bicarb-citric acid 20 mEq TbEF 20 mEq daily. Via PEG-tube   ? QUEtiapine (SEROQUEL) 25 MG tablet 12.5 mg 3 (three) times a day. Via PEG-tube   ? ticagrelor (BRILINTA) 90 mg Tab 90 mg 2 (two) times a day. Via PEG-tube   ? traZODone (DESYREL) 50 MG tablet 50 mg at bedtime. Via PEG-tube   ? white petrolatum (AQUAPHOR ORIGINAL) 41 % Oint Apply 1 application topically 2 (two) times a day. Apply to feet/legs         REVIEW OF SYSTEMS:  Unable to verbalize due to aphasia    PHYSICAL EXAMINATION:  Vitals:    09/27/20 1901   BP: 98/64   Pulse: 80    Temp: 98.2  F (36.8  C)   Weight: 167 lb (75.8 kg)         GENERAL: Does not follow simple commands, makes eye contact, non verbal.  HEENT: Head is normocephalic with normal hair distribution. No evidence of trauma. Ears: No acute purulent discharge. Eyes: Conjunctivae pink with no scleral jaundice. Nose: Normal mucosa and septum. NECK: Supple with no cervical or supraclavicular lymphadenopathy. Trachea is midline, healing trach stoma  EXTREMITIES: Atraumatic. Full range of motion on both upper and lower extremities, there is no tenderness to palpation, no pedal edema, no cyanosis or clubbing, no calf tenderness, normal cap refill, no joint swelling.  SKIN: Warm and dry, no erythema noted, abdomen with g tube  NEUROLOGICAL: The patient is oriented to person, TBI      LABS:    Lab Results   Component Value Date    WBC 8.3 08/06/2020    HGB 11.0 (L) 08/06/2020    HCT 34.9 (L) 08/06/2020    MCV 90 08/06/2020     08/06/2020       Results for orders placed or performed in visit on 09/17/20   Basic Metabolic Panel   Result Value Ref Range    Sodium 141 136 - 145 mmol/L    Potassium 4.5 3.5 - 5.0 mmol/L    Chloride 105 98 - 107 mmol/L    CO2 28 22 - 31 mmol/L    Anion Gap, Calculation 8 5 - 18 mmol/L    Glucose 91 70 - 125 mg/dL    Calcium 9.8 8.5 - 10.5 mg/dL    BUN 11 8 - 22 mg/dL    Creatinine 0.78 0.70 - 1.30 mg/dL    GFR MDRD Af Amer >60 >60 mL/min/1.73m2    GFR MDRD Non Af Amer >60 >60 mL/min/1.73m2           No results found for: HGBA1C  No results found for: [id number]  No results found for: [id number]    ASSESSMENT/PLAN:  1. Hypoxic brain damage (H)    2. Dysphagia, unspecified type      1..Hypoxic brain injury:   We currently continues with rehab at this juncture wheelchair-bound he will fall risk due to restlessness in bed and has a scoop mattress. Nursing staff does walk him with gait belt and guidance. His ambulating has an odd goose step when walking.  Met with our therapist today, and we discussed  that murali Phillips, rehab center in [place] did get back to them and have asked her all of September notes to review Scot could be a candidate for their TBI area, no bed available at this time. Facility will work on SMERT and look at LTC placement for now.    2. Dysphagia: Spoke to Lucy  today and we did review an upcoming BS study. Scot may be able to advance to oral intake but its prudent we do  A study first. He will need minor sedation first as he may get agitated. We will fu closer to time when we schedule it.     Electronically signed by:  Fouzia High CNP  This progress note was completed using Dragon software and there may be grammatical errors.

## 2021-06-20 NOTE — LETTER
Letter by Fouzia High CNP at      Author: Fouzia High CNP Service: -- Author Type: --    Filed:  Encounter Date: 10/8/2020 Status: (Other)         Patient: Scot Bishop   MR Number: [id number]   YOB: 1980   Date of Visit: 10/8/2020       [facility]      NAME:  Scot Bishop             :  1980    MRN: [id number]    CODE STATUS:  Full    FACILITY: [facility] [[id number]]       CHIEF COMPLAIN/REASON FOR VISIT:  Chief Complaint   Patient presents with   ? Review Of Multiple Medical Conditions     brain injury       HISTORY OF PRESENT ILLNESS: Scot Bishop is a 40 y.o. male being seen for review of multiple medical conditions . He did poorly on his recent swallow study at  due to his brain injury, agitation and short attention span.. ST has started supervised soft food feedings.   [place] [facility] [place] to a [facility] in May 2020. At that juncture he was in cardiac cath lab and suffered a hypoxic brain injury, high temp and thrombotic event. He is seen in his room today. Non verbal and did not track with his eyes. He will make eye contact. He has repetitive movement while in bed.He has repetitive laughing today. He varies between crying and laughing per nursing.  Moving legs and arms. Per nursing he  Slid from bed over week end, no apparent injuries, they are placing a scoop mattress for preventive falls. . He will need ongoing rehab services with PT/OT/ST. Has Gtube due to his dysphagia, which reviewed that weight has been stable.. Unfortunealy due to Scot Hypoxic brain injury he will not be able to understand hygiene education and nursing will need to meet these needs for pt. Therapy continues to work with him, seen ambualting with SBA two.Staff to anticipate needs and provide good elizabeth care two times a day and prn.   continues to work with family towards DC to a group home after TCU stay as patient will be unable  to care for self, remains dependant on staff for all ADL and mobility as well as GTube for feedings.Wt stable but he needs frequent 1 to 1 when up out of bed.        . No Known Allergies:     Current Outpatient Medications   Medication Sig   ? acetaminophen (TYLENOL) 650 mg/20.3 mL Soln 650 mg every 6 (six) hours as needed. Via PEG-tube    ? amantadine HCL (SYMMETREL) 50 mg/5 mL solution 100 mg every 12 (twelve) hours. Via PEG-tube   ? aspirin 81 MG EC tablet 81 mg daily. Via PEG-tube   ? atorvastatin (LIPITOR) 40 MG tablet 40 mg at bedtime. Via PEG-tube   ? bromocriptine (PARLODEL) 2.5 mg tablet 2.5 mg 2 (two) times a day. Via PEG-tube   ? chlorhexidine (PERIDEX) 0.12 % solution Apply 15 mL to the mouth or throat 4 (four) times a day.   ? levETIRAcetam (KEPPRA) 100 mg/mL solution 1,000 mg 2 (two) times a day. Via PEG-tube   ? lisinopriL (PRINIVIL,ZESTRIL) 2.5 MG tablet 1.25 mg daily. Via PEG-tube, hold if SBP <90   ? LORazepam (ATIVAN) 0.5 MG tablet 1 tablet (0.5 mg total) by G-tube route 4 (four) times a day.   ? metoclopramide (REGLAN) 5 MG tablet 5 mg 2 (two) times a day. Via PEG-tube   ? metoprolol tartrate (LOPRESSOR) 25 MG tablet 6.25 mg 2 (two) times a day. Via PEG-tube, hold if SBP <90, HR <60   ? neomycin-bacitracin-polymyxin B (NEOSPORIN) 3.5-400-5,000 mg-unit-unit OiPk ointment Apply 1 application topically 2 (two) times a day.   ? omeprazole (PRILOSEC) 2 mg/mL SusR suspension 40 mg daily before breakfast. Via PEG-tube   ? pantoprazole (PROTONIX) 40 MG tablet 40 mg daily. Via PEG-tube   ? potassium bicarb-citric acid 20 mEq TbEF 20 mEq daily. Via PEG-tube   ? QUEtiapine (SEROQUEL) 25 MG tablet 12.5 mg 3 (three) times a day. Via PEG-tube   ? ticagrelor (BRILINTA) 90 mg Tab 90 mg 2 (two) times a day. Via PEG-tube   ? traZODone (DESYREL) 50 MG tablet 50 mg at bedtime. Via PEG-tube   ? white petrolatum (AQUAPHOR ORIGINAL) 41 % Oint Apply 1 application topically 2 (two) times a day. Apply to feet/legs          REVIEW OF SYSTEMS:  Unable to verbalize due to aphasia    PHYSICAL EXAMINATION:  There were no vitals filed for this visit.      GENERAL: Does not follow simple commands, makes eye contact, non verbal.  HEENT: Head is normocephalic with normal hair distribution. No evidence of trauma. Ears: No acute purulent discharge. Eyes: Conjunctivae pink with no scleral jaundice. Nose: Normal mucosa and septum. NECK: Supple with no cervical or supraclavicular lymphadenopathy. Trachea is midline, healing trach stoma  Lungs : He is CTA  EXTREMITIES: Atraumatic. Full range of motion on both upper and lower extremities, there is no tenderness to palpation, no pedal edema, no cyanosis or clubbing, no calf tenderness, normal cap refill, no joint swelling.  SKIN: Warm and dry, no erythema noted, abdomen with g tube  NEUROLOGICAL: The patient is oriented to person, TBI  Social Distancing with PPE practiced due to Covid 19    LABS:    Lab Results   Component Value Date    WBC 8.3 08/06/2020    HGB 11.0 (L) 08/06/2020    HCT 34.9 (L) 08/06/2020    MCV 90 08/06/2020     08/06/2020       Results for orders placed or performed in visit on 10/05/20   Basic Metabolic Panel   Result Value Ref Range    Sodium 141 136 - 145 mmol/L    Potassium 4.2 3.5 - 5.0 mmol/L    Chloride 104 98 - 107 mmol/L    CO2 26 22 - 31 mmol/L    Anion Gap, Calculation 11 5 - 18 mmol/L    Glucose 106 70 - 125 mg/dL    Calcium 9.7 8.5 - 10.5 mg/dL    BUN 13 8 - 22 mg/dL    Creatinine 0.83 0.70 - 1.30 mg/dL    GFR MDRD Af Amer >60 >60 mL/min/1.73m2    GFR MDRD Non Af Amer >60 >60 mL/min/1.73m2           No results found for: HGBA1C  No results found for: OXHWCPTR22FF  No results found for: ERFULCFH68    ASSESSMENT/PLAN:  1. Dysphagia, unspecified type    2. Hypoxic brain injury (H)      1.. Dysphagia: Has  Started  soft food feedings. Remains on TFing eneteral for nutrition, bolus feedings , all meds via gtube. Nursing will need strict VS and Lung  assessment QS and we will stop in the event he does not tolerate well.Lungs clear and no cough    2. Hypoxic brain injury: Continues with therapies, IDT to review update tomorrow. DC planning , will need LTC while awaiting brain injury group home.     Electronically signed by:  Fouzia High CNP  This progress note was completed using Dragon software and there may be grammatical errors.

## 2021-06-20 NOTE — LETTER
Letter by Cortney Bahena MBBS at      Author: Cortney Bahena MBBS Service: -- Author Type: --    Filed:  Encounter Date: 8/6/2020 Status: (Other)         Patient: Scot Bishop   MR Number: [id number]   YOB: 1980   Date of Visit: 8/6/2020       [facility] Admission note      Patient: Scot Bishop  MRN: [id number]  Date of Service: 8/6/2020      [facility] [[id number]]  Reason for Visit     Chief Complaint   Patient presents with   ? Review Of Multiple Medical Conditions   Follow-up on multiple falls    Code Status     FULL CODE    Assessment     -Acute VF arrest secondary to acute ST SUKI  -Acute LAD occlusion with underlying history of coronary artery disease  -Hypoxic brain injury  - Severe dysphagia currently discharged on tube feeding  -Aspiration pneumonia currently resolved  -Acute hypoxic respiratory failure currently resolved  -History of lengthy stay in the hospital with multiple procedures including requiring intubation and ECMO support from 5 17-5 19 with tracheostomy and PEG placement.  -LOWELL currently resolved  -Persistent hypotension    Plan     Patient has been admitted to the TCU as a transfer from the hospital where he had a lengthy stay of more than 2 months.  . Unfortunately no significant change in condition has been noted.  He remains nonverbal and aphasic.  Any attempt at communication nonverbally with sign language or with gestures or with a writing board has been unsuccessful either.  He is tolerating his tube feeding well.  He continues to have some spontaneous movements of his legs and arms but otherwise does not follow commands.  Nursing is reporting multiple falls from the patient rolling out of bed and found on the floor they are eventually planning to put a mattress on the floor for him.  They have told me that no matter what intervention to do as soon as he leaves the room he is found on the floor again.  I did talk to the   and the plan is that he will be moving to a group home whenever appropriate  Blood pressures are frequently low and suspect autonomic instability.  He is on very low doses of his medications with hold parameters continue with his current care plan  Recheck labs done in the DC were reviewed stable electrolytes and kidney function with no acute changes noted    History     Patient is a very pleasant 40 y.o. male who is admitted to TCU  Patient has been admitted after a very lengthy more than 60-day stay in the hospital.  He presented following PEA/VF arrest in the community.  He was admitted and noted to have anterior acute ST SUKI.  He had multiple episodes of cardiac arrest  He required ECMO support and was emergently taken for cardiac catheterization which showed a thrombotic occlusion of the LAD which was treated with aspiration thrombectomy and PCI with drug-eluting stent of the proximal LAD.  Currently he has been discharged on medical management.  His blood pressures remain low but otherwise he does not give any nonverbal signs of chest pain.  He seems to be breathing comfortably    He also suffered from hypoxic brain injury.  Initial CT of the head was negative but EEG shows severe diffuse encephalopathy without seizure or epilepsy.  Repeat CT did show multifocal acute/subacute cerebral infarcts.  And brain injury which is consistent with hypoxic brain injury.  No improvement noted in the TCU he continues to be pretty nonresponsive and nonverbal.  No attempt of communication in any form has been successful he does not make any eye contact.  He does not follow any verbal commands either communication using gestures writing board is not successful either    He was noted to have aspiration pneumonitis which was treated.  Subsequently due to profound dysphagia he had PEG tube placement done and initiated on tube feeding.  Unfortunately had recurrent emesis after PEG tube placement.  A CT of his chest abdomen pelvis  was done which did not show any source for his emesis.  Zofran has been added for relief.  He is also on Protonix.  Tube feeding is going well  And did report that he had a large BM after almost 8 days    He had persistent fever in the hospital with elevated white count.  He had extensive work-up done x-ray will did not show any findings CT was negative.  Repeat C. difficile was negative and UA negative he did undergo 7 days of Zosyn which she is currently done with.  He currently remains afebrile    He had multiple other hospital issues including hyper natremia with elevated sodiums.  Acute renal insufficiency as well as thrombocytosis.  He also underwent shock liver.  Currently has been discharged to the TCU for strengthening rehab and eventually will need placement  Unfortunately he is poorly participatory in therapy because of his hypoxic brain injury and noted to be unresponsive to any verbal or nonverbal stimuli    Past Medical History     Active Ambulatory (Non-Hospital) Problems    Diagnosis   ? Hypoxic brain injury (H)   ? Dysphagia   ? Cardiac arrest (H)   ? Primary Lymphadenitis   ? Nicotine Dependence   ? Backache     Past Medical History:   Diagnosis Date   ? Acute respiratory failure with hypoxia (H)    ? LOWELL (acute kidney injury) (H)    ? Cardiac arrest (H)    ? Dysphagia    ? HTN (hypertension)    ? Hypoxic ischemic encephalopathy    ? NSTEMI (non-ST elevated myocardial infarction) (H)        Past Social History     Reviewed, and he  reports that he has been smoking. He has never used smokeless tobacco. He reports current alcohol use. He reports that he does not use drugs.    Family History     Reviewed, and family history includes Diabetes in his maternal aunt; Lung cancer in his maternal grandfather; Other in his father.    Medication List   Post Discharge Medication Reconciliation Status: discharge medications reconciled, continue medications without change   Current Outpatient Medications on File  Prior to Visit   Medication Sig Dispense Refill   ? acetaminophen (TYLENOL) 650 mg/20.3 mL Soln 650 mg every 6 (six) hours as needed. Via PEG-tube      ? amantadine HCL (SYMMETREL) 50 mg/5 mL solution 100 mg every 12 (twelve) hours. Via PEG-tube     ? aspirin 81 MG EC tablet 81 mg daily. Via PEG-tube     ? atorvastatin (LIPITOR) 40 MG tablet 40 mg at bedtime. Via PEG-tube     ? bromocriptine (PARLODEL) 2.5 mg tablet 2.5 mg 2 (two) times a day. Via PEG-tube     ? chlorhexidine (PERIDEX) 0.12 % solution Apply 15 mL to the mouth or throat 4 (four) times a day.     ? furosemide (LASIX) 10 mg/mL solution 10 mg every other day. Via PEG-tube, hold if SBP <90     ? levETIRAcetam (KEPPRA) 100 mg/mL solution 1,000 mg 2 (two) times a day. Via PEG-tube     ? lisinopriL (PRINIVIL,ZESTRIL) 2.5 MG tablet 1.25 mg daily. Via PEG-tube, hold if SBP <90     ? LORazepam (ATIVAN) 0.5 MG tablet 1 tablet (0.5 mg total) by G-tube route 4 (four) times a day. 120 tablet 0   ? metoclopramide (REGLAN) 5 MG tablet 5 mg 2 (two) times a day. Via PEG-tube     ? metoprolol tartrate (LOPRESSOR) 25 MG tablet 6.25 mg 2 (two) times a day. Via PEG-tube, hold if SBP <90, HR <60     ? neomycin-bacitracin-polymyxin B (NEOSPORIN) 3.5-400-5,000 mg-unit-unit OiPk ointment Apply 1 application topically 2 (two) times a day.     ? omeprazole (PRILOSEC) 2 mg/mL SusR suspension 40 mg daily before breakfast. Via PEG-tube     ? pantoprazole (PROTONIX) 40 MG tablet 40 mg daily. Via PEG-tube     ? potassium bicarb-citric acid 20 mEq TbEF 20 mEq daily. Via PEG-tube     ? QUEtiapine (SEROQUEL) 25 MG tablet 12.5 mg 3 (three) times a day. Via PEG-tube     ? ticagrelor (BRILINTA) 90 mg Tab 90 mg 2 (two) times a day. Via PEG-tube     ? traZODone (DESYREL) 50 MG tablet 50 mg at bedtime. Via PEG-tube     ? white petrolatum (AQUAPHOR ORIGINAL) 41 % Oint Apply 1 application topically 2 (two) times a day. Apply to feet/legs       No current facility-administered medications on  file prior to visit.        Allergies     No Known Allergies    Review of Systems   A comprehensive review of 14 systems was done. Pertinent findings noted here and in history of present illness. All the rest negative.  Constitutional: Negative.  Negative for fever, chills, he has activity change, appetite change and fatigue.   HENT: Negative for congestion and facial swelling.    Eyes: Negative for photophobia, redness and visual disturbance.   Respiratory: Negative for cough and chest tightness.    Cardiovascular: Negative for chest pain, palpitations and leg swelling.   Gastrointestinal: Negative for nausea, diarrhea, constipation, blood in stool and abdominal distention.   Genitourinary: Negative.    Musculoskeletal: Negative.  Hard to assess he does move his legs and arms spontaneously but not to command  Skin: Negative.    Neurological: Negative for dizziness, tremors, syncope, weakness, light-headedness and headaches.   Nonresponsive  Hematological: Does not bruise/bleed easily.   Psychiatric/Behavioral: Negative.        Physical Exam     Recent Vitals 8/4/2020   Weight -   BP 98/66   Pulse 97   Temp 97.8   Temp src -   SpO2 -       Constitutional:  appears well-developed.   HEENT:  Normocephalic and atraumatic.  Eyes: Conjunctivae and EOM are normal. Pupils are equal, round, and reactive to light. No discharge.  No scleral icterus. Nose normal. Mouth/Throat: Oropharynx is clear and moist. No oropharyngeal exudate.    NECK: Normal range of motion. Neck supple. No JVD present. No tracheal deviation present. No thyromegaly present.   CARDIOVASCULAR: Normal rate, regular rhythm and intact distal pulses.  Exam reveals no gallop and no friction rub.  Systolic murmur present.  PULMONARY: Effort normal and breath sounds normal. No respiratory distress.No Wheezing or rales.  ABDOMEN: Soft. Bowel sounds are normal. No distension and no mass.  There is no tenderness. There is no rebound and no guarding. No  HSM.  MUSCULOSKELETAL: Normal range of motion. No edema and no tenderness. Mild kyphosis, no tenderness.  Hard to assess as patient does not respond to verbal commands or follow them.  He seems to be spontaneously moving his arms and legs though  LYMPH NODES: Has no cervical, supraclavicular, axillary and groin adenopathy.   NEUROLOGICAL: Alert and oriented to NONE. No cranial nerve deficit.  Normal muscle tone. Coordination normal.   He does not make any eye contact.  Patient is aphasic and does not talk.  Does not follow verbal commands either  GENITOURINARY: Deferred exam.  SKIN: Skin is warm and dry. No rash noted. No erythema. No pallor.   EXTREMITIES: No cyanosis, no clubbing, no edema. No Deformity.  PSYCHIATRIC: mood, affect and behavior is hard to assess because of noncommunication.      Lab Results     Hemoglobin 10.6.  White count was 11.5 with platelets of 336.  Electrolytes were normal.  Calcium 9.7 magnesium 2.2.  CR 0.76  4 days 119 AST 27 ALT 45  Recent Results (from the past 240 hour(s))   Basic Metabolic Panel   Result Value Ref Range    Sodium 138 136 - 145 mmol/L    Potassium 3.5 3.5 - 5.0 mmol/L    Chloride 104 98 - 107 mmol/L    CO2 22 22 - 31 mmol/L    Anion Gap, Calculation 12 5 - 18 mmol/L    Glucose 91 70 - 125 mg/dL    Calcium 9.7 8.5 - 10.5 mg/dL    BUN 13 8 - 22 mg/dL    Creatinine 0.82 0.70 - 1.30 mg/dL    GFR MDRD Af Amer >60 >60 mL/min/1.73m2    GFR MDRD Non Af Amer >60 >60 mL/min/1.73m2   HM2(CBC w/o Differential)   Result Value Ref Range    WBC 8.3 4.0 - 11.0 thou/uL    RBC 3.87 (L) 4.40 - 6.20 mill/uL    Hemoglobin 11.0 (L) 14.0 - 18.0 g/dL    Hematocrit 34.9 (L) 40.0 - 54.0 %    MCV 90 80 - 100 fL    MCH 28.4 27.0 - 34.0 pg    MCHC 31.5 (L) 32.0 - 36.0 g/dL    RDW 14.2 11.0 - 14.5 %    Platelets 296 140 - 440 thou/uL    MPV 11.5 8.5 - 12.5 fL   Magnesium   Result Value Ref Range    Magnesium 2.0 1.8 - 2.6 mg/dL       Imaging Results     Echocardiogram on 6/10/2020 shows LV  ejection fraction of 36%.  LAD territory akinesis with right ventricular function chamber size wall movement and thickness are normal.    Coronary angiogram shows 100% stenosis of the proximal LAD lesion    CT of the head was negative    DARIO Vargas

## 2021-06-20 NOTE — LETTER
Letter by Cortney Bahena MBBS at      Author: Cortney Bahena MBBS Service: -- Author Type: --    Filed:  Encounter Date: 8/25/2020 Status: (Other)         Patient: Scot Bishop   MR Number: [id number]   YOB: 1980   Date of Visit: 8/25/2020       [facility]  note      Patient: Scot Bishop  MRN: [id number]  Date of Service: 8/25/2020      [facility] [[id number]]  Reason for Visit     Chief Complaint   Patient presents with   ? Problem Visit   Follow-up on fever /low bp    Code Status     FULL CODE    Assessment     -Low-grade temperatures of unclear etiology  -Persistent hypotension low blood pressures noted  -Acute VF arrest secondary to acute ST SUKI  -Acute LAD occlusion with underlying history of coronary artery disease  -Hypoxic brain injury  - Severe dysphagia currently discharged on tube feeding; now with staff reporting patient constantly pulling tube feeding out  -Aspiration pneumonia currently resolved  -Acute hypoxic respiratory failure currently resolved  -History of lengthy stay in the hospital with multiple procedures including requiring intubation and ECMO support from 5 17-5 19 with tracheostomy and PEG placement.  -LOWELL currently resolved  -Suspected dehydration due to patient refusing to feeding    Plan     Patient has been admitted to the TCU as a transfer from the hospital where he had a lengthy stay of more than 2 months.  He is currently in the TCU but remains poorly responsive nonverbal with no attempt at communication possible due to anoxic brain injury.  Nursing requested an evaluation secondary to low-grade temperatures of 99 100   his T-max has been 100.1  He has no localizing symptoms unfortunately he is not a good historian because of his ongoing brain injury.  Continue to monitor temperatures and if greater than 100.5 chest x-ray UA UC and routine labs.  Suspect autonomic insufficiency may be contributing to it.  Chart review does reveal  that he had ongoing fever concerns in the hospital to extensive work-up was negative.  Recently he was started on antibiotics and is on Augmentin for G-tube site except infection which have been because of patient pulling on his tube the site is being monitored closely to  He also has persistent hypotension blood pressures 100/61.  Care plan reviewed with dietitian and his fluid flushes have been liberalized  Again suspect most likely secondary to autonomic insufficiency.  He is on low doses of beta-blocker as well as lisinopril which are held based on his blood pressure regimens.  Also reviewed was discharge planning and goals of care reviewed with family.  He has had a patio visit with his father done and goals of care have been reviewed with them.  He is looking at discharging to a group home      History     Patient is a very pleasant 40 y.o. male who is admitted to TCU  Patient has been admitted after a very lengthy more than 60-day stay in the hospital.  He presented following PEA/VF arrest in the community.  He was admitted and noted to have anterior acute ST SUKI.  He had multiple episodes of cardiac arrest  He required ECMO support and was emergently taken for cardiac catheterization which showed a thrombotic occlusion of the LAD which was treated with aspiration thrombectomy and PCI with drug-eluting stent of the proximal LAD.  He remains chest pain free but unfortunately due to poor history being obtained from him no symptoms could be obtained nursing is reporting nonverbal signs are missing for any pain issues.  He is compliant with his medications which are being given through his feeding tube    He also suffered from hypoxic brain injury.  Initial CT of the head was negative but EEG shows severe diffuse encephalopathy without seizure or epilepsy.  Repeat CT did show multifocal acute/subacute cerebral infarcts.  And brain injury which is consistent with hypoxic brain injury.  No improvement noted in the  TCU he continues to be pretty nonresponsive and nonverbal.    At baseline he does not follow any commands.  He remains nonverbal and does not respond to any verbal or nonverbal stimuli.  He is not making any eye comment either  He continues with therapy as per them he is making some progress.    He remains on tube feeding.  Recently has had a lot of difficulties with tube feeding.  He was sent him back in the hospital for G-tube replacement.  Due to his repeated difficulty with G-tube he has been switched from 24/7 feeding to bolus feeding during the daytime which has worked much better.  All his meds are given via tube as he does not swallow well either  He is nowhere close to starting any feeding trial  Due to concern about wound around his PEG tube site he is on Augmentin      He has had multiple falls in the TCU.  Placement efforts have been unsuccessful and finally has been transferred to mattress on the floor.  Unfortunately remains hard to care for in light of his profound cognitive impairment.  No attempt for positioning has been successful.  He has been tried on a Broda chair but that has been unsuccessful nursing   Unfortunately blood pressure is low and nursing continues to hold his medications because of that reason they report that they it is very difficult to check his blood pressures because of nonstop involuntary movements.    They also wanted to review because of low-grade temperatures.  He has had ongoing fever in the hospital to work-up so far has been negative      Past Medical History     Active Ambulatory (Non-Hospital) Problems    Diagnosis   ? Impetigo any site   ? Hypoxic brain damage (H)   ? Acute respiratory failure with hypoxia (H)   ? Gastrojejunostomy tube status (H)   ? Hypoxic brain injury (H)   ? Dysphagia   ? Cardiac arrest (H)   ? Primary Lymphadenitis   ? Nicotine Dependence   ? Backache     Past Medical History:   Diagnosis Date   ? Acute respiratory failure with hypoxia (H)    ?  LOWELL (acute kidney injury) (H)    ? Cardiac arrest (H)    ? Dysphagia    ? HTN (hypertension)    ? Hypoxic ischemic encephalopathy    ? NSTEMI (non-ST elevated myocardial infarction) (H)        Past Social History     Reviewed, and he  reports that he has been smoking. He has never used smokeless tobacco. He reports current alcohol use. He reports that he does not use drugs.    Family History     Reviewed, and family history includes Diabetes in his maternal aunt; Lung cancer in his maternal grandfather; Other in his father.    Medication List   Post Discharge Medication Reconciliation Status: discharge medications reconciled, continue medications without change   Current Outpatient Medications on File Prior to Visit   Medication Sig Dispense Refill   ? acetaminophen (TYLENOL) 650 mg/20.3 mL Soln 650 mg every 6 (six) hours as needed. Via PEG-tube      ? amantadine HCL (SYMMETREL) 50 mg/5 mL solution 100 mg every 12 (twelve) hours. Via PEG-tube     ? aspirin 81 MG EC tablet 81 mg daily. Via PEG-tube     ? atorvastatin (LIPITOR) 40 MG tablet 40 mg at bedtime. Via PEG-tube     ? bromocriptine (PARLODEL) 2.5 mg tablet 2.5 mg 2 (two) times a day. Via PEG-tube     ? chlorhexidine (PERIDEX) 0.12 % solution Apply 15 mL to the mouth or throat 4 (four) times a day.     ? furosemide (LASIX) 10 mg/mL solution 10 mg every other day. Via PEG-tube, hold if SBP <90     ? levETIRAcetam (KEPPRA) 100 mg/mL solution 1,000 mg 2 (two) times a day. Via PEG-tube     ? lisinopriL (PRINIVIL,ZESTRIL) 2.5 MG tablet 1.25 mg daily. Via PEG-tube, hold if SBP <90     ? LORazepam (ATIVAN) 0.5 MG tablet 1 tablet (0.5 mg total) by G-tube route 4 (four) times a day. 120 tablet 0   ? metoclopramide (REGLAN) 5 MG tablet 5 mg 2 (two) times a day. Via PEG-tube     ? metoprolol tartrate (LOPRESSOR) 25 MG tablet 6.25 mg 2 (two) times a day. Via PEG-tube, hold if SBP <90, HR <60     ? neomycin-bacitracin-polymyxin B (NEOSPORIN) 3.5-400-5,000 mg-unit-unit OiPk  ointment Apply 1 application topically 2 (two) times a day.     ? omeprazole (PRILOSEC) 2 mg/mL SusR suspension 40 mg daily before breakfast. Via PEG-tube     ? pantoprazole (PROTONIX) 40 MG tablet 40 mg daily. Via PEG-tube     ? potassium bicarb-citric acid 20 mEq TbEF 20 mEq daily. Via PEG-tube     ? QUEtiapine (SEROQUEL) 25 MG tablet 12.5 mg 3 (three) times a day. Via PEG-tube     ? ticagrelor (BRILINTA) 90 mg Tab 90 mg 2 (two) times a day. Via PEG-tube     ? traZODone (DESYREL) 50 MG tablet 50 mg at bedtime. Via PEG-tube     ? white petrolatum (AQUAPHOR ORIGINAL) 41 % Oint Apply 1 application topically 2 (two) times a day. Apply to feet/legs       No current facility-administered medications on file prior to visit.        Allergies     No Known Allergies    Review of Systems   A comprehensive review of 14 systems was done. Pertinent findings noted here and in history of present illness. All the rest negative.  Constitutional: Negative.  Negative for fever, chills, he has activity change, appetite change and fatigue.   He has now been walking and taking a few steps.  He has been talking also  HENT: Negative for congestion and facial swelling.    Eyes: Negative for photophobia, redness and visual disturbance.   Respiratory: Negative for cough and chest tightness.    Cardiovascular: Negative for chest pain, palpitations and leg swelling.   Gastrointestinal: Negative for nausea, diarrhea, constipation, blood in stool and abdominal distention.   Genitourinary: Negative.    Musculoskeletal: Negative.  Hard to assess he does move his legs and arms spontaneously but not to command  Skin: Negative.    Neurological: Negative for dizziness, tremors, syncope, weakness, light-headedness and headaches.   Constant scissoring like movement of his legs and arms noted.  Significant excoriation of the right hip area noted  Pulling his tube feeding out  Nonresponsive  Hematological: Does not bruise/bleed easily.    Psychiatric/Behavioral: Negative.  Unable to assess as patient does not cooperate  However today noted to be talking spontaneously  Staff reporting that he is not sleeping well.  He has significant anxiety and frequently rolls out of bed      Physical Exam     Recent Vitals 8/22/2020   Weight 176 lbs   BP 96/61   Pulse 98   Temp 65   Temp src -   SpO2 -   Some recent data might be hidden   Weight is 174 pounds blood pressure 100 /61 temp 98 pulse 80    Constitutional:  appears well-developed.   HEENT:  Normocephalic and atraumatic.  Eyes: Conjunctivae and EOM are normal. Pupils are equal, round, and reactive to light. No discharge.  No scleral icterus. Nose normal. Mouth/Throat: Oropharynx is clear and moist. No oropharyngeal exudate.    NECK: Normal range of motion. Neck supple. No JVD present. No tracheal deviation present. No thyromegaly present.   CARDIOVASCULAR: Normal rate, regular rhythm and intact distal pulses.  Exam reveals no gallop and no friction rub.  Systolic murmur present.  PULMONARY: Effort normal and breath sounds normal. No respiratory distress.No Wheezing or rales.  ABDOMEN: Soft. Bowel sounds are normal. No distension and no mass.  There is no tenderness. There is no rebound and no guarding. No HSM.  Does have a G-tube which is somewhat sore around the insertion site  MUSCULOSKELETAL: Normal range of motion. No edema and no tenderness. Mild kyphosis, no tenderness.  Hard to assess as patient does not respond to verbal commands or follow them.  He seems to be spontaneously moving his arms and legs though  LYMPH NODES: Has no cervical, supraclavicular, axillary and groin adenopathy.   NEUROLOGICAL: Alert and oriented to NONE. No cranial nerve deficit.  Normal muscle tone. Coordination normal.   He does not make any eye contact.  Does not follow verbal commands either  GENITOURINARY: Deferred exam.  SKIN: Skin is warm and dry. No rash noted. No erythema. No pallor.   EXTREMITIES: No cyanosis, no  clubbing, no edema. No Deformity.  PSYCHIATRIC: mood, affect and behavior is hard to assess because of noncommunication.      Lab Results     Hemoglobin 10.6.  White count was 11.5 with platelets of 336.  Electrolytes were normal.  Calcium 9.7 magnesium 2.2.  CR 0.76  4 days 119 AST 27 ALT 45  Recent Results (from the past 240 hour(s))   Basic Metabolic Panel   Result Value Ref Range    Sodium 141 136 - 145 mmol/L    Potassium 4.0 3.5 - 5.0 mmol/L    Chloride 104 98 - 107 mmol/L    CO2 25 22 - 31 mmol/L    Anion Gap, Calculation 12 5 - 18 mmol/L    Glucose 90 70 - 125 mg/dL    Calcium 9.5 8.5 - 10.5 mg/dL    BUN 12 8 - 22 mg/dL    Creatinine 0.81 0.70 - 1.30 mg/dL    GFR MDRD Af Amer >60 >60 mL/min/1.73m2    GFR MDRD Non Af Amer >60 >60 mL/min/1.73m2       Imaging Results     Echocardiogram on 6/10/2020 shows LV ejection fraction of 36%.  LAD territory akinesis with right ventricular function chamber size wall movement and thickness are normal.    Coronary angiogram shows 100% stenosis of the proximal LAD lesion    CT of the head was negative    DARIO Vargas

## 2021-06-20 NOTE — LETTER
Letter by Fouzia High CNP at      Author: Fouzia High CNP Service: -- Author Type: --    Filed:  Encounter Date: 2020 Status: (Other)         Patient: Scot Bishop   MR Number: [id number]   YOB: 1980   Date of Visit: 2020       [facility]      NAME:  Scot Bishop             :  1980    MRN: [id number]    CODE STATUS:      FACILITY: [facility] [[id number]]       CHIEF COMPLAIN/REASON FOR VISIT:  Chief Complaint   Patient presents with   ? Review Of Multiple Medical Conditions     TBI       HISTORY OF PRESENT ILLNESS: Scot Bishop is a 40 y.o. male being seen for review of multiple medical conditions.  [facility] to a [facility] in May 2020. At that juncture he was in cardiac cath lab and suffered a hypoxic brain injury, high temp and thrombotic event. He is seen in his room today. Non verbal and did not track with his eyes. He will make eye contact. He has repetitive movement while in bed.He has repetitive laughing today. He varies between crying and laughing per nursing.  Moving legs and arms. Per nursing he  Slid from bed over week end, no apparent injuries, they are placing a scoop mattress for preventive falls. . He will need ongoing rehab services with PT/OT/ST. Has Gtube due to his dysphagia, which reviewed that weight has been stable.. Unfortunealy due to Scot Hypoxic brain injury he will not be able to understand hygiene education and nursing will need to meet these needs for pt. Therapy continues to work with him, seen ambualting with SBA two.Staff to anticipate needs and provide good elizabeth care two times a day and prn.   continues to work with family towards DC to a group home after TCU stay as patient will be unable to care for self.    No Known Allergies:     Current Outpatient Medications   Medication Sig   ? acetaminophen (TYLENOL) 650 mg/20.3 mL Soln 650 mg every 6 (six) hours  as needed. Via PEG-tube    ? amantadine HCL (SYMMETREL) 50 mg/5 mL solution 100 mg every 12 (twelve) hours. Via PEG-tube   ? aspirin 81 MG EC tablet 81 mg daily. Via PEG-tube   ? atorvastatin (LIPITOR) 40 MG tablet 40 mg at bedtime. Via PEG-tube   ? bromocriptine (PARLODEL) 2.5 mg tablet 2.5 mg 2 (two) times a day. Via PEG-tube   ? chlorhexidine (PERIDEX) 0.12 % solution Apply 15 mL to the mouth or throat 4 (four) times a day.   ? furosemide (LASIX) 10 mg/mL solution 10 mg every other day. Via PEG-tube, hold if SBP <90   ? levETIRAcetam (KEPPRA) 100 mg/mL solution 1,000 mg 2 (two) times a day. Via PEG-tube   ? lisinopriL (PRINIVIL,ZESTRIL) 2.5 MG tablet 1.25 mg daily. Via PEG-tube, hold if SBP <90   ? LORazepam (ATIVAN) 0.5 MG tablet 1 tablet (0.5 mg total) by G-tube route 4 (four) times a day.   ? metoclopramide (REGLAN) 5 MG tablet 5 mg 2 (two) times a day. Via PEG-tube   ? metoprolol tartrate (LOPRESSOR) 25 MG tablet 6.25 mg 2 (two) times a day. Via PEG-tube, hold if SBP <90, HR <60   ? neomycin-bacitracin-polymyxin B (NEOSPORIN) 3.5-400-5,000 mg-unit-unit OiPk ointment Apply 1 application topically 2 (two) times a day.   ? omeprazole (PRILOSEC) 2 mg/mL SusR suspension 40 mg daily before breakfast. Via PEG-tube   ? pantoprazole (PROTONIX) 40 MG tablet 40 mg daily. Via PEG-tube   ? potassium bicarb-citric acid 20 mEq TbEF 20 mEq daily. Via PEG-tube   ? QUEtiapine (SEROQUEL) 25 MG tablet 12.5 mg 3 (three) times a day. Via PEG-tube   ? ticagrelor (BRILINTA) 90 mg Tab 90 mg 2 (two) times a day. Via PEG-tube   ? traZODone (DESYREL) 50 MG tablet 50 mg at bedtime. Via PEG-tube   ? white petrolatum (AQUAPHOR ORIGINAL) 41 % Oint Apply 1 application topically 2 (two) times a day. Apply to feet/legs         REVIEW OF SYSTEMS:  Unable to verbalize due to aphasia    PHYSICAL EXAMINATION:  Vitals:    09/13/20 1530   BP: 102/68   Pulse: 86   Temp: 97.2  F (36.2  C)   Weight: 169 lb 3.2 oz (76.7 kg)         GENERAL: Does not  follow simple commands, makes eye contact, non verbal.  HEENT: Head is normocephalic with normal hair distribution. No evidence of trauma. Ears: No acute purulent discharge. Eyes: Conjunctivae pink with no scleral jaundice. Nose: Normal mucosa and septum. NECK: Supple with no cervical or supraclavicular lymphadenopathy. Trachea is midline, healing trach stoma  EXTREMITIES: Atraumatic. Full range of motion on both upper and lower extremities, there is no tenderness to palpation, no pedal edema, no cyanosis or clubbing, no calf tenderness, normal cap refill, no joint swelling.  SKIN: Warm and dry, no erythema noted, abdomen with 4 less erythema, improving.  NEUROLOGICAL: The patient is oriented to person, TBI      LABS:    Lab Results   Component Value Date    WBC 8.3 08/06/2020    HGB 11.0 (L) 08/06/2020    HCT 34.9 (L) 08/06/2020    MCV 90 08/06/2020     08/06/2020       Results for orders placed or performed in visit on 08/18/20   Basic Metabolic Panel   Result Value Ref Range    Sodium 141 136 - 145 mmol/L    Potassium 4.0 3.5 - 5.0 mmol/L    Chloride 104 98 - 107 mmol/L    CO2 25 22 - 31 mmol/L    Anion Gap, Calculation 12 5 - 18 mmol/L    Glucose 90 70 - 125 mg/dL    Calcium 9.5 8.5 - 10.5 mg/dL    BUN 12 8 - 22 mg/dL    Creatinine 0.81 0.70 - 1.30 mg/dL    GFR MDRD Af Amer >60 >60 mL/min/1.73m2    GFR MDRD Non Af Amer >60 >60 mL/min/1.73m2           No results found for: HGBA1C  No results found for: [id number]  No results found for: [id number]    ASSESSMENT/PLAN:  1. Dysphagia, unspecified type    2. G tube feedings (H)    3. Hypoxic brain damage (H)      1./ 2 .Gtube DUE TO DYSPHAGIA :Wt stable at 164.8 lb, occasional loose stools, is incont of both B and B.Now with bolus feedings vs continuous.Gtube site is clean w/o redness.     3.Hypoxic brain injury: I met with  today and patient has had a waiver for TBI and Medicaid approved, family and process of looking for group home as patient  will need ongoing care after DC.  We currently continues with rehab at this juncture wheelchair-bound he will fall risk due to restlessness in bed and has a school mattress.       Electronically signed by:  Fouzia High CNP  This progress note was completed using Dragon software and there may be grammatical errors.

## 2021-06-20 NOTE — LETTER
Letter by Cortney Bahena MBBS at      Author: Cortney Bahena MBBS Service: -- Author Type: --    Filed:  Encounter Date: 8/11/2020 Status: (Other)         Patient: Scot Bishop   MR Number: [id number]   YOB: 1980   Date of Visit: 8/11/2020       [facility] Admission note      Patient: Scot Bishop  MRN: [id number]  Date of Service: 8/11/2020      [facility] [[id number]]  Reason for Visit     Chief Complaint   Patient presents with   ? Review Of Multiple Medical Conditions   Follow-up on multiple falls    Code Status     FULL CODE    Assessment     -Acute VF arrest secondary to acute ST SUKI  -Acute LAD occlusion with underlying history of coronary artery disease  -Hypoxic brain injury  - Severe dysphagia currently discharged on tube feeding  -Aspiration pneumonia currently resolved  -Acute hypoxic respiratory failure currently resolved  -History of lengthy stay in the hospital with multiple procedures including requiring intubation and ECMO support from 5 17-5 19 with tracheostomy and PEG placement.  -LOWELL currently resolved  -Persistent hypotension    Plan     Patient has been admitted to the TCU as a transfer from the hospital where he had a lengthy stay of more than 2 months.  Today has had a significant change in condition with more awake and alertness noted.  He was noted to be sitting up and trying to talk though he did not participate in any conversation.  He is also had a few episodes when he has been noted to be walking with staff though he has significant hypertonicity.  Mood and behaviors are labile has been stable.  He is tolerating his tube feeding well.  Blood pressures remain labile with frequent low blood pressures noted in the TCU.  No evidence of any volume overload  No breakthrough seizures.  Recheck labs have all been stable with stable electrolytes and white count.  He will eventually need placement if he does not have a significant improvement in  his cognitive status  Staff reporting no improvement in his anxiety he does not sleep very well at night due to anxiety.  He still requires close one-to-one monitoring frequently because he will constantly try to roll out of his bed resulting in multiple falls.  So far no intervention has been successful  We will monitor his sleep log      History     Patient is a very pleasant 40 y.o. male who is admitted to TCU  Patient has been admitted after a very lengthy more than 60-day stay in the hospital.  He presented following PEA/VF arrest in the community.  He was admitted and noted to have anterior acute ST SUKI.  He had multiple episodes of cardiac arrest  He required ECMO support and was emergently taken for cardiac catheterization which showed a thrombotic occlusion of the LAD which was treated with aspiration thrombectomy and PCI with drug-eluting stent of the proximal LAD.  Currently he has been discharged on medical management.  His blood pressures remain low but otherwise he does not give any nonverbal signs of chest pain.  He seems to be breathing comfortably    He also suffered from hypoxic brain injury.  Initial CT of the head was negative but EEG shows severe diffuse encephalopathy without seizure or epilepsy.  Repeat CT did show multifocal acute/subacute cerebral infarcts.  And brain injury which is consistent with hypoxic brain injury.  No improvement noted in the TCU he continues to be pretty nonresponsive and nonverbal.  No attempt of communication in any form has been successful he does not make any eye contact.  However today he has had a significant change in condition is more awake and alert and he seems to be spontaneously talking though he is not able to hold a conversation.  Staff is reporting that they have been since walking with him a little bit though he does have significant hypertonicity noted    He was noted to have aspiration pneumonitis which was treated.  Subsequently due to profound  dysphagia he had PEG tube placement done and initiated on tube feeding.  Unfortunately had recurrent emesis after PEG tube placement.  A CT of his chest abdomen pelvis was done which did not show any source for his emesis.  Zofran has been added for relief.  He is also on Protonix.  Tube feeding is going well  He has been reporting that his tube feeding has been going well.  He has not had any emesis.    He had persistent fever in the hospital with elevated white count.  He had extensive work-up done x-ray will did not show any findings CT was negative.  Repeat C. difficile was negative and UA negative he did undergo 7 days of Zosyn which she is currently done with.  He currently remains afebrile  Recheck CBC done in the TCU shows a white count of 8.3.    He had multiple other hospital issues including hyper natremia with elevated sodiums.  Acute renal insufficiency as well as thrombocytosis.  He also underwent shock liver.  Currently has been discharged to the TCU for strengthening rehab and eventually will need placement  Unfortunately cognitively though he is more awake and alert now not much improvement noted in mental status and he remains poorly participatory in his cares    Past Medical History     Active Ambulatory (Non-Hospital) Problems    Diagnosis   ? Hypoxic brain injury (H)   ? Dysphagia   ? Cardiac arrest (H)   ? Primary Lymphadenitis   ? Nicotine Dependence   ? Backache     Past Medical History:   Diagnosis Date   ? Acute respiratory failure with hypoxia (H)    ? LOWELL (acute kidney injury) (H)    ? Cardiac arrest (H)    ? Dysphagia    ? HTN (hypertension)    ? Hypoxic ischemic encephalopathy    ? NSTEMI (non-ST elevated myocardial infarction) (H)        Past Social History     Reviewed, and he  reports that he has been smoking. He has never used smokeless tobacco. He reports current alcohol use. He reports that he does not use drugs.    Family History     Reviewed, and family history includes Diabetes  in his maternal aunt; Lung cancer in his maternal grandfather; Other in his father.    Medication List   Post Discharge Medication Reconciliation Status: discharge medications reconciled, continue medications without change   Current Outpatient Medications on File Prior to Visit   Medication Sig Dispense Refill   ? acetaminophen (TYLENOL) 650 mg/20.3 mL Soln 650 mg every 6 (six) hours as needed. Via PEG-tube      ? amantadine HCL (SYMMETREL) 50 mg/5 mL solution 100 mg every 12 (twelve) hours. Via PEG-tube     ? aspirin 81 MG EC tablet 81 mg daily. Via PEG-tube     ? atorvastatin (LIPITOR) 40 MG tablet 40 mg at bedtime. Via PEG-tube     ? bromocriptine (PARLODEL) 2.5 mg tablet 2.5 mg 2 (two) times a day. Via PEG-tube     ? chlorhexidine (PERIDEX) 0.12 % solution Apply 15 mL to the mouth or throat 4 (four) times a day.     ? furosemide (LASIX) 10 mg/mL solution 10 mg every other day. Via PEG-tube, hold if SBP <90     ? levETIRAcetam (KEPPRA) 100 mg/mL solution 1,000 mg 2 (two) times a day. Via PEG-tube     ? lisinopriL (PRINIVIL,ZESTRIL) 2.5 MG tablet 1.25 mg daily. Via PEG-tube, hold if SBP <90     ? LORazepam (ATIVAN) 0.5 MG tablet 1 tablet (0.5 mg total) by G-tube route 4 (four) times a day. 120 tablet 0   ? metoclopramide (REGLAN) 5 MG tablet 5 mg 2 (two) times a day. Via PEG-tube     ? metoprolol tartrate (LOPRESSOR) 25 MG tablet 6.25 mg 2 (two) times a day. Via PEG-tube, hold if SBP <90, HR <60     ? neomycin-bacitracin-polymyxin B (NEOSPORIN) 3.5-400-5,000 mg-unit-unit OiPk ointment Apply 1 application topically 2 (two) times a day.     ? omeprazole (PRILOSEC) 2 mg/mL SusR suspension 40 mg daily before breakfast. Via PEG-tube     ? pantoprazole (PROTONIX) 40 MG tablet 40 mg daily. Via PEG-tube     ? potassium bicarb-citric acid 20 mEq TbEF 20 mEq daily. Via PEG-tube     ? QUEtiapine (SEROQUEL) 25 MG tablet 12.5 mg 3 (three) times a day. Via PEG-tube     ? ticagrelor (BRILINTA) 90 mg Tab 90 mg 2 (two) times a  day. Via PEG-tube     ? traZODone (DESYREL) 50 MG tablet 50 mg at bedtime. Via PEG-tube     ? white petrolatum (AQUAPHOR ORIGINAL) 41 % Oint Apply 1 application topically 2 (two) times a day. Apply to feet/legs       No current facility-administered medications on file prior to visit.        Allergies     No Known Allergies    Review of Systems   A comprehensive review of 14 systems was done. Pertinent findings noted here and in history of present illness. All the rest negative.  Constitutional: Negative.  Negative for fever, chills, he has activity change, appetite change and fatigue.   He has now been walking and taking a few steps.  He has been talking also  HENT: Negative for congestion and facial swelling.    Eyes: Negative for photophobia, redness and visual disturbance.   Respiratory: Negative for cough and chest tightness.    Cardiovascular: Negative for chest pain, palpitations and leg swelling.   Gastrointestinal: Negative for nausea, diarrhea, constipation, blood in stool and abdominal distention.   Genitourinary: Negative.    Musculoskeletal: Negative.  Hard to assess he does move his legs and arms spontaneously but not to command  Skin: Negative.    Neurological: Negative for dizziness, tremors, syncope, weakness, light-headedness and headaches.   Nonresponsive  Hematological: Does not bruise/bleed easily.   Psychiatric/Behavioral: Negative.  Unable to assess as patient does not cooperate  However today noted to be talking spontaneously  Staff reporting that he is not sleeping well.  He has significant anxiety and frequently rolls out of bed      Physical Exam     Recent Vitals 8/9/2020   Weight 219 lbs   /74   Pulse 98   Temp 98.3   Temp src -   SpO2 -   Some recent data might be hidden       Constitutional:  appears well-developed.   HEENT:  Normocephalic and atraumatic.  Eyes: Conjunctivae and EOM are normal. Pupils are equal, round, and reactive to light. No discharge.  No scleral icterus.  Nose normal. Mouth/Throat: Oropharynx is clear and moist. No oropharyngeal exudate.    NECK: Normal range of motion. Neck supple. No JVD present. No tracheal deviation present. No thyromegaly present.   CARDIOVASCULAR: Normal rate, regular rhythm and intact distal pulses.  Exam reveals no gallop and no friction rub.  Systolic murmur present.  PULMONARY: Effort normal and breath sounds normal. No respiratory distress.No Wheezing or rales.  ABDOMEN: Soft. Bowel sounds are normal. No distension and no mass.  There is no tenderness. There is no rebound and no guarding. No HSM.  MUSCULOSKELETAL: Normal range of motion. No edema and no tenderness. Mild kyphosis, no tenderness.  Hard to assess as patient does not respond to verbal commands or follow them.  He seems to be spontaneously moving his arms and legs though  LYMPH NODES: Has no cervical, supraclavicular, axillary and groin adenopathy.   NEUROLOGICAL: Alert and oriented to NONE. No cranial nerve deficit.  Normal muscle tone. Coordination normal.   He does not make any eye contact.  Does not follow verbal commands either  GENITOURINARY: Deferred exam.  SKIN: Skin is warm and dry. No rash noted. No erythema. No pallor.   EXTREMITIES: No cyanosis, no clubbing, no edema. No Deformity.  PSYCHIATRIC: mood, affect and behavior is hard to assess because of noncommunication.      Lab Results     Hemoglobin 10.6.  White count was 11.5 with platelets of 336.  Electrolytes were normal.  Calcium 9.7 magnesium 2.2.  CR 0.76  4 days 119 AST 27 ALT 45  Recent Results (from the past 240 hour(s))   Basic Metabolic Panel   Result Value Ref Range    Sodium 138 136 - 145 mmol/L    Potassium 3.5 3.5 - 5.0 mmol/L    Chloride 104 98 - 107 mmol/L    CO2 22 22 - 31 mmol/L    Anion Gap, Calculation 12 5 - 18 mmol/L    Glucose 91 70 - 125 mg/dL    Calcium 9.7 8.5 - 10.5 mg/dL    BUN 13 8 - 22 mg/dL    Creatinine 0.82 0.70 - 1.30 mg/dL    GFR MDRD Af Amer >60 >60 mL/min/1.73m2    GFR MDRD Non  Af Amer >60 >60 mL/min/1.73m2   HM2(CBC w/o Differential)   Result Value Ref Range    WBC 8.3 4.0 - 11.0 thou/uL    RBC 3.87 (L) 4.40 - 6.20 mill/uL    Hemoglobin 11.0 (L) 14.0 - 18.0 g/dL    Hematocrit 34.9 (L) 40.0 - 54.0 %    MCV 90 80 - 100 fL    MCH 28.4 27.0 - 34.0 pg    MCHC 31.5 (L) 32.0 - 36.0 g/dL    RDW 14.2 11.0 - 14.5 %    Platelets 296 140 - 440 thou/uL    MPV 11.5 8.5 - 12.5 fL   Magnesium   Result Value Ref Range    Magnesium 2.0 1.8 - 2.6 mg/dL       Imaging Results     Echocardiogram on 6/10/2020 shows LV ejection fraction of 36%.  LAD territory akinesis with right ventricular function chamber size wall movement and thickness are normal.    Coronary angiogram shows 100% stenosis of the proximal LAD lesion    CT of the head was negative    DARIO Vargas

## 2021-06-20 NOTE — LETTER
Letter by Fouzia High CNP at      Author: Fouzia High CNP Service: -- Author Type: --    Filed:  Encounter Date: 2020 Status: (Other)         Patient: Scot Bishop   MR Number: [id number]   YOB: 1980   Date of Visit: 2020       [facility]      NAME:  Scot Bishop             :  1980    MRN: [id number]    CODE STATUS:      FACILITY: [facility] [[id number]]       CHIEF COMPLAIN/REASON FOR VISIT:  Chief Complaint   Patient presents with   ? Problem Visit     gtube leaking       HISTORY OF PRESENT ILLNESS: Scot Bishop is a 40 y.o. male being seen tper nursing request for his GTube malfunction. He has no known medical past as per his EMR from [facility] prior to a NStemi in May 2020. At that juncture he was in cardiac cath lab and suffered a hypoxic brain injury, high temp and thrombotic event. He is seen in his room today. Non verbal and did not track with his eyes. He will make eye contact. He has repetitive movement while in bed.He has repetitive laughing today. He varies between crying and laughing per nursing.  Moving legs and arms. Per nursing he  Slid from bed over week end, no apparent injuries, they are placing a scoop mattress for preventive falls. . He will need ongoing rehab services with PT/OT/ST. Has Gtube de to his dysphagia., spent a great deal of time educaing nursing R/T GT use and problem solving ongoing concerns.    No Known Allergies:     Current Outpatient Medications   Medication Sig   ? acetaminophen (TYLENOL) 650 mg/20.3 mL Soln 650 mg every 6 (six) hours as needed. Via PEG-tube    ? amantadine HCL (SYMMETREL) 50 mg/5 mL solution 100 mg every 12 (twelve) hours. Via PEG-tube   ? aspirin 81 MG EC tablet 81 mg daily. Via PEG-tube   ? atorvastatin (LIPITOR) 40 MG tablet 40 mg at bedtime. Via PEG-tube   ? bromocriptine (PARLODEL) 2.5 mg tablet 2.5 mg 2 (two) times a day. Via PEG-tube   ? chlorhexidine  (PERIDEX) 0.12 % solution Apply 15 mL to the mouth or throat 4 (four) times a day.   ? furosemide (LASIX) 10 mg/mL solution 10 mg every other day. Via PEG-tube, hold if SBP <90   ? levETIRAcetam (KEPPRA) 100 mg/mL solution 1,000 mg 2 (two) times a day. Via PEG-tube   ? lisinopriL (PRINIVIL,ZESTRIL) 2.5 MG tablet 1.25 mg daily. Via PEG-tube, hold if SBP <90   ? LORazepam (ATIVAN) 0.5 MG tablet 1 tablet (0.5 mg total) by G-tube route 4 (four) times a day.   ? metoclopramide (REGLAN) 5 MG tablet 5 mg 2 (two) times a day. Via PEG-tube   ? metoprolol tartrate (LOPRESSOR) 25 MG tablet 6.25 mg 2 (two) times a day. Via PEG-tube, hold if SBP <90, HR <60   ? neomycin-bacitracin-polymyxin B (NEOSPORIN) 3.5-400-5,000 mg-unit-unit OiPk ointment Apply 1 application topically 2 (two) times a day.   ? omeprazole (PRILOSEC) 2 mg/mL SusR suspension 40 mg daily before breakfast. Via PEG-tube   ? pantoprazole (PROTONIX) 40 MG tablet 40 mg daily. Via PEG-tube   ? potassium bicarb-citric acid 20 mEq TbEF 20 mEq daily. Via PEG-tube   ? QUEtiapine (SEROQUEL) 25 MG tablet 12.5 mg 3 (three) times a day. Via PEG-tube   ? ticagrelor (BRILINTA) 90 mg Tab 90 mg 2 (two) times a day. Via PEG-tube   ? traZODone (DESYREL) 50 MG tablet 50 mg at bedtime. Via PEG-tube   ? white petrolatum (AQUAPHOR ORIGINAL) 41 % Oint Apply 1 application topically 2 (two) times a day. Apply to feet/legs         REVIEW OF SYSTEMS:  Unable to verbalize due to aphasia    PHYSICAL EXAMINATION:  Vitals:    08/18/20 0718   BP: 98/88   Pulse: (!) 111   Temp: 97.5  F (36.4  C)   Weight: 173 lb 6.4 oz (78.7 kg)         GENERAL: Does not follow simple commands, makes eye contact, non verbal.  HEENT: Head is normocephalic with normal hair distribution. No evidence of trauma. Ears: No acute purulent discharge. Eyes: Conjunctivae pink with no scleral jaundice. Nose: Normal mucosa and septum. NECK: Supple with no cervical or supraclavicular lymphadenopathy. Trachea is midline,  healing trach stoma  CHEST: No tenderness or deformity, no crepitus  LUNG: Clear to auscultation with good chest expansion. There are no crackles or wheezes, normal AP diameter.    CVS: There is good S1  S2, rhythm is regular.Pulse is at 98\  ABDOMEN: Globular and soft, nontender to palpation, non distended, no masses, no organomegaly, good bowel sounds, no rebound or guarding, no peritoneal signs. Gtube in place, wears an abdominal binder to prevent pulling  EXTREMITIES: Atraumatic. Full range of motion on both upper and lower extremities, there is no tenderness to palpation, no pedal edema, no cyanosis or clubbing, no calf tenderness, normal cap refill, no joint swelling.  SKIN: Warm and dry, no erythema noted, no rashes or lesions.  NEUROLOGICAL: The patient is oriented to person, place and time. Strength and sensation are grossly intact. Face is symmetric.          LABS:    Lab Results   Component Value Date    WBC 8.3 08/06/2020    HGB 11.0 (L) 08/06/2020    HCT 34.9 (L) 08/06/2020    MCV 90 08/06/2020     08/06/2020       Results for orders placed or performed in visit on 08/06/20   Basic Metabolic Panel   Result Value Ref Range    Sodium 138 136 - 145 mmol/L    Potassium 3.5 3.5 - 5.0 mmol/L    Chloride 104 98 - 107 mmol/L    CO2 22 22 - 31 mmol/L    Anion Gap, Calculation 12 5 - 18 mmol/L    Glucose 91 70 - 125 mg/dL    Calcium 9.7 8.5 - 10.5 mg/dL    BUN 13 8 - 22 mg/dL    Creatinine 0.82 0.70 - 1.30 mg/dL    GFR MDRD Af Amer >60 >60 mL/min/1.73m2    GFR MDRD Non Af Amer >60 >60 mL/min/1.73m2           No results found for: HGBA1C  No results found for: [id number]  No results found for: [id number]    ASSESSMENT/PLAN:  1. Acute respiratory failure with hypoxia (H)    2. Gastrojejunostomy tube status (H)      1. Hypoxic brain injury: Will be in TCU for rehabilitation, PT/OT/ST  And dietary to follow SW for dc planning ,  SN to manage meds via tube and chronic medical conditons. Fall risk , a scoop  mattress will be put in place.     2 . Dysphagia and GTUBE leaking: All nutrition via Gtube, Placement port not staying in place. Requested nursing ry to see if stopcock device will stay in place and have TFing flow thru this. If not able to find will need to have IR apt to replace feeding tube. D/T brain injury pt restless and much movment. Uses abdominal binder to preven any dislodgment of tube.  Conflicting reports of pt not recievingf tfing over week end due to gtube problems. Lips dry.  We will run IVFS D51/2 at 50 cc until we can get the TFing working. Start fluids at 50 cc an hour x24 hrs and then we will fu on 8/18.  Electronically signed by:  Fouzia High CNP  This progress note was completed using Dragon software and there may be grammatical errors.        48  minutes spent of which greater than 60  % was face to face communication with the patient and nursing about above plan of care including  Gtube probleems and alternative hydration until tube funtioning properly.

## 2021-06-20 NOTE — LETTER
Letter by Fouzia High CNP at      Author: Fouzia High CNP Service: -- Author Type: --    Filed:  Encounter Date: 2020 Status: (Other)         Patient: Scot Bishop   MR Number: [id number]   YOB: 1980   Date of Visit: 2020       [facility]      NAME:  Scot Bishop             :  1980    MRN: [id number]    CODE STATUS:      FACILITY: [facility] [[id number]]       CHIEF COMPLAIN/REASON FOR VISIT:  Chief Complaint   Patient presents with   ? Problem Visit     low BP       HISTORY OF PRESENT ILLNESS: Scot Bishop is a 40 y.o. male being seen for per nursing request for low BP's.  [place] [facility] [place] [facility] [place]. At that juncture he was in cardiac cath lab and suffered a hypoxic brain injury, high temp and thrombotic event. He is seen in his room today. Non verbal and did not track with his eyes. He will make eye contact. He has repetitive movement while in bed.He has repetitive laughing today. He varies between crying and laughing per nursing.  Moving legs and arms. Per nursing he  Slid from bed over week end, no apparent injuries, they are placing a scoop mattress for preventive falls. . He will need ongoing rehab services with PT/OT/ST. Has Gtube due to his dysphagia, which reviewed that weight has been stable.. Unfortunealy due to Scot Hypoxic brain injury he will not be able to understand hygiene education and nursing will need to meet these needs for pt. Therapy continues to work with him, seen ambualting with SBA two.Staff to anticipate needs and provide good elizabeth care two times a day and prn.   continues to work with family towards DC to a group home after TCU stay as patient will be unable to care for self, remains dependant on staff for all ADL and mobility as well as GTube for feedings.. Pt does continue with rehab services    No Known Allergies:     Current Outpatient  Medications   Medication Sig   ? acetaminophen (TYLENOL) 650 mg/20.3 mL Soln 650 mg every 6 (six) hours as needed. Via PEG-tube    ? amantadine HCL (SYMMETREL) 50 mg/5 mL solution 100 mg every 12 (twelve) hours. Via PEG-tube   ? aspirin 81 MG EC tablet 81 mg daily. Via PEG-tube   ? atorvastatin (LIPITOR) 40 MG tablet 40 mg at bedtime. Via PEG-tube   ? bromocriptine (PARLODEL) 2.5 mg tablet 2.5 mg 2 (two) times a day. Via PEG-tube   ? chlorhexidine (PERIDEX) 0.12 % solution Apply 15 mL to the mouth or throat 4 (four) times a day.   ? levETIRAcetam (KEPPRA) 100 mg/mL solution 1,000 mg 2 (two) times a day. Via PEG-tube   ? lisinopriL (PRINIVIL,ZESTRIL) 2.5 MG tablet 1.25 mg daily. Via PEG-tube, hold if SBP <90   ? LORazepam (ATIVAN) 0.5 MG tablet 1 tablet (0.5 mg total) by G-tube route 4 (four) times a day.   ? metoclopramide (REGLAN) 5 MG tablet 5 mg 2 (two) times a day. Via PEG-tube   ? metoprolol tartrate (LOPRESSOR) 25 MG tablet 6.25 mg 2 (two) times a day. Via PEG-tube, hold if SBP <90, HR <60   ? neomycin-bacitracin-polymyxin B (NEOSPORIN) 3.5-400-5,000 mg-unit-unit OiPk ointment Apply 1 application topically 2 (two) times a day.   ? omeprazole (PRILOSEC) 2 mg/mL SusR suspension 40 mg daily before breakfast. Via PEG-tube   ? pantoprazole (PROTONIX) 40 MG tablet 40 mg daily. Via PEG-tube   ? potassium bicarb-citric acid 20 mEq TbEF 20 mEq daily. Via PEG-tube   ? QUEtiapine (SEROQUEL) 25 MG tablet 12.5 mg 3 (three) times a day. Via PEG-tube   ? ticagrelor (BRILINTA) 90 mg Tab 90 mg 2 (two) times a day. Via PEG-tube   ? traZODone (DESYREL) 50 MG tablet 50 mg at bedtime. Via PEG-tube   ? white petrolatum (AQUAPHOR ORIGINAL) 41 % Oint Apply 1 application topically 2 (two) times a day. Apply to feet/legs         REVIEW OF SYSTEMS:  Unable to verbalize due to aphasia    PHYSICAL EXAMINATION:  Vitals:    09/17/20 0649   BP: 96/60   Pulse: 69   Temp: 99.1  F (37.3  C)   Weight: 165 lb 4.8 oz (75 kg)         GENERAL: Does  not follow simple commands, makes eye contact, non verbal.  HEENT: Head is normocephalic with normal hair distribution. No evidence of trauma. Ears: No acute purulent discharge. Eyes: Conjunctivae pink with no scleral jaundice. Nose: Normal mucosa and septum. NECK: Supple with no cervical or supraclavicular lymphadenopathy. Trachea is midline, healing trach stoma  EXTREMITIES: Atraumatic. Full range of motion on both upper and lower extremities, there is no tenderness to palpation, no pedal edema, no cyanosis or clubbing, no calf tenderness, normal cap refill, no joint swelling.  SKIN: Warm and dry, no erythema noted, abdomen with 4 less erythema, improving.  NEUROLOGICAL: The patient is oriented to person, TBI      LABS:    Lab Results   Component Value Date    WBC 8.3 08/06/2020    HGB 11.0 (L) 08/06/2020    HCT 34.9 (L) 08/06/2020    MCV 90 08/06/2020     08/06/2020       Results for orders placed or performed in visit on 08/18/20   Basic Metabolic Panel   Result Value Ref Range    Sodium 141 136 - 145 mmol/L    Potassium 4.0 3.5 - 5.0 mmol/L    Chloride 104 98 - 107 mmol/L    CO2 25 22 - 31 mmol/L    Anion Gap, Calculation 12 5 - 18 mmol/L    Glucose 90 70 - 125 mg/dL    Calcium 9.5 8.5 - 10.5 mg/dL    BUN 12 8 - 22 mg/dL    Creatinine 0.81 0.70 - 1.30 mg/dL    GFR MDRD Af Amer >60 >60 mL/min/1.73m2    GFR MDRD Non Af Amer >60 >60 mL/min/1.73m2           No results found for: HGBA1C  No results found for: [id number]  No results found for: [id number]    ASSESSMENT/PLAN:  1. Hypoxic brain damage (H)    2. Hypotension due to drugs      1./Hypoxic brain injury:   We currently continues with rehab at this juncture wheelchair-bound he will fall risk due to restlessness in bed and has a scoop mattress. Nursing staff does walk him with gait belt and guidance. His ambulating has an odd goose step when walking.Has ativan due to agitation    2. BP: Runs lower , with HO HTN, Cardiac events. We will dc lasix and  check BMP in am.     Electronically signed by:  Fouzia High CNP  This progress note was completed using Dragon software and there may be grammatical errors.

## 2021-06-20 NOTE — LETTER
Letter by Fouzia High CNP at      Author: Fouzia High CNP Service: -- Author Type: --    Filed:  Encounter Date: 2020 Status: (Other)         Patient: Scot Bishop   MR Number: [id number]   YOB: 1980   Date of Visit: 2020       [facility]      NAME:  Scot Bishop             :  1980    MRN: [id number]    CODE STATUS:      FACILITY: [facility] [[id number]]       CHIEF COMPLAIN/REASON FOR VISIT:  Chief Complaint   Patient presents with   ? Review Of Multiple Medical Conditions     BRAIN INJURY       HISTORY OF PRESENT ILLNESS: Scot Bishop is a 40 y.o. male being seen for review of multiple medical conditions.He continues with rehab services as family is looking for dc placement. Per  he has a waiver now. IDT will meet on 9/15 to discuss more.  [place] [facility] [place] [facility] [place] May 2020. At that juncture he was in cardiac cath lab and suffered a hypoxic brain injury, high temp and thrombotic event. He is seen in his room today. Non verbal and did not track with his eyes. He will make eye contact. He has repetitive movement while in bed.He has repetitive laughing today. He varies between crying and laughing per nursing.  Moving legs and arms. Per nursing he  Slid from bed over week end, no apparent injuries, they are placing a scoop mattress for preventive falls. . He will need ongoing rehab services with PT/OT/ST. Has Gtube due to his dysphagia, which reviewed that weight has been stable.. Unfortunealy due to Scot Hypoxic brain injury he will not be able to understand hygiene education and nursing will need to meet these needs for pt. Therapy continues to work with him, seen ambualting with SBA two.Staff to anticipate needs and provide good elizabeth care two times a day and prn.   continues to work with family towards DC to a group home after TCU stay as patient will be unable to care for self,  remains dependant on staff for all ADL and mobility as well as GTube for feedings..    No Known Allergies:     Current Outpatient Medications   Medication Sig   ? acetaminophen (TYLENOL) 650 mg/20.3 mL Soln 650 mg every 6 (six) hours as needed. Via PEG-tube    ? amantadine HCL (SYMMETREL) 50 mg/5 mL solution 100 mg every 12 (twelve) hours. Via PEG-tube   ? aspirin 81 MG EC tablet 81 mg daily. Via PEG-tube   ? atorvastatin (LIPITOR) 40 MG tablet 40 mg at bedtime. Via PEG-tube   ? bromocriptine (PARLODEL) 2.5 mg tablet 2.5 mg 2 (two) times a day. Via PEG-tube   ? chlorhexidine (PERIDEX) 0.12 % solution Apply 15 mL to the mouth or throat 4 (four) times a day.   ? furosemide (LASIX) 10 mg/mL solution 10 mg every other day. Via PEG-tube, hold if SBP <90   ? levETIRAcetam (KEPPRA) 100 mg/mL solution 1,000 mg 2 (two) times a day. Via PEG-tube   ? lisinopriL (PRINIVIL,ZESTRIL) 2.5 MG tablet 1.25 mg daily. Via PEG-tube, hold if SBP <90   ? LORazepam (ATIVAN) 0.5 MG tablet 1 tablet (0.5 mg total) by G-tube route 4 (four) times a day.   ? metoclopramide (REGLAN) 5 MG tablet 5 mg 2 (two) times a day. Via PEG-tube   ? metoprolol tartrate (LOPRESSOR) 25 MG tablet 6.25 mg 2 (two) times a day. Via PEG-tube, hold if SBP <90, HR <60   ? neomycin-bacitracin-polymyxin B (NEOSPORIN) 3.5-400-5,000 mg-unit-unit OiPk ointment Apply 1 application topically 2 (two) times a day.   ? omeprazole (PRILOSEC) 2 mg/mL SusR suspension 40 mg daily before breakfast. Via PEG-tube   ? pantoprazole (PROTONIX) 40 MG tablet 40 mg daily. Via PEG-tube   ? potassium bicarb-citric acid 20 mEq TbEF 20 mEq daily. Via PEG-tube   ? QUEtiapine (SEROQUEL) 25 MG tablet 12.5 mg 3 (three) times a day. Via PEG-tube   ? ticagrelor (BRILINTA) 90 mg Tab 90 mg 2 (two) times a day. Via PEG-tube   ? traZODone (DESYREL) 50 MG tablet 50 mg at bedtime. Via PEG-tube   ? white petrolatum (AQUAPHOR ORIGINAL) 41 % Oint Apply 1 application topically 2 (two) times a day. Apply to  feet/legs         REVIEW OF SYSTEMS:  Unable to verbalize due to aphasia    PHYSICAL EXAMINATION:  Vitals:    09/14/20 1611   BP: 98/67   Pulse: 93   Temp: 98.7  F (37.1  C)   Weight: 168 lb 3.2 oz (76.3 kg)         GENERAL: Does not follow simple commands, makes eye contact, non verbal.  HEENT: Head is normocephalic with normal hair distribution. No evidence of trauma. Ears: No acute purulent discharge. Eyes: Conjunctivae pink with no scleral jaundice. Nose: Normal mucosa and septum. NECK: Supple with no cervical or supraclavicular lymphadenopathy. Trachea is midline, healing trach stoma  EXTREMITIES: Atraumatic. Full range of motion on both upper and lower extremities, there is no tenderness to palpation, no pedal edema, no cyanosis or clubbing, no calf tenderness, normal cap refill, no joint swelling.  SKIN: Warm and dry, no erythema noted, abdomen with 4 less erythema, improving.  NEUROLOGICAL: The patient is oriented to person, TBI      LABS:    Lab Results   Component Value Date    WBC 8.3 08/06/2020    HGB 11.0 (L) 08/06/2020    HCT 34.9 (L) 08/06/2020    MCV 90 08/06/2020     08/06/2020       Results for orders placed or performed in visit on 08/18/20   Basic Metabolic Panel   Result Value Ref Range    Sodium 141 136 - 145 mmol/L    Potassium 4.0 3.5 - 5.0 mmol/L    Chloride 104 98 - 107 mmol/L    CO2 25 22 - 31 mmol/L    Anion Gap, Calculation 12 5 - 18 mmol/L    Glucose 90 70 - 125 mg/dL    Calcium 9.5 8.5 - 10.5 mg/dL    BUN 12 8 - 22 mg/dL    Creatinine 0.81 0.70 - 1.30 mg/dL    GFR MDRD Af Amer >60 >60 mL/min/1.73m2    GFR MDRD Non Af Amer >60 >60 mL/min/1.73m2           No results found for: HGBA1C  No results found for: [id number]  No results found for: [id number]    ASSESSMENT/PLAN:  1. G tube feedings (H)    2. Dysphagia, unspecified type    3. Hypoxic brain damage (H)      1./ 2 .Gtube due to dysphagia :Wt stable at 164.8 lb. Needs ongoing SN support for feedings and med admin via tube due  to dysphagia.      3.Hypoxic brain injury:   We currently continues with rehab at this juncture wheelchair-bound he will fall risk due to restlessness in bed and has a scoop mattress. Nursing staff ashley walk him with gait belt and guidance. His ambulating has an odd goose step when walking.     Electronically signed by:  Fouzia High CNP  This progress note was completed using Dragon software and there may be grammatical errors.

## 2021-06-20 NOTE — LETTER
Letter by Cortney Bahena MBBS at      Author: Cortney Bahena MBBS Service: -- Author Type: --    Filed:  Encounter Date: 8/18/2020 Status: (Other)         Patient: Scot Bishop   MR Number: [id number]   YOB: 1980   Date of Visit: 8/18/2020       [facility]  note      Patient: Scot Bishop  MRN: [id number]  Date of Service: 8/18/2020      [facility] [[id number]]  Reason for Visit     Chief Complaint   Patient presents with   ? Problem Visit   Follow-up on multiple falls; difficulty with tube feeding    Code Status     FULL CODE    Assessment     -Acute VF arrest secondary to acute ST SUKI  -Acute LAD occlusion with underlying history of coronary artery disease  -Hypoxic brain injury  - Severe dysphagia currently discharged on tube feeding; now with staff reporting patient constantly pulling tube feeding out  -Aspiration pneumonia currently resolved  -Acute hypoxic respiratory failure currently resolved  -History of lengthy stay in the hospital with multiple procedures including requiring intubation and ECMO support from 5 17-5 19 with tracheostomy and PEG placement.  -LOWELL currently resolved  -Persistent hypotension  -Suspected dehydration due to patient refusing to feeding    Plan     Patient has been admitted to the TCU as a transfer from the hospital where he had a lengthy stay of more than 2 months.  Today has had a significant change in condition with more awake and alertness noted.  However he is not tolerating his tube feeding and repeatedly pulling it out.  Due to this his tube feeding is currently being held.  Urgent appointment in IR is made for him.  That will be done to check his positioning of tube.  Care conference done with nursing speech therapy as well as with dietary  Unfortunately no improvement noted and he is not ready for any trials of oral intake.  Dietary and I had a discussion we are going to discontinue his 24-hour tube feeding.  We will switch  him to overnight tube feeding  He can get bolus feeding during the daytime  Planning to switch him from tube feeding overnight from 6 PM to 8 AM  We will give him bolus feedings during the day  If this is not successful we will plan on giving him just bolus feedings 3-5 times a day to meet his caloric requirement.  Continue with his water flushes to prevent dehydration.  Will add medication for agitation if no improvement noted.  In addition they have already tied a binder to his stomach.   also contacted me to have a care conference with family to discuss goals of care he continues to be a full code.  Unfortunately no meaningful recovery is made and he is not able to ambulate or participate in his cares.  At this point I am requesting them to set up a care conference but before that family to have a face-to-face visit with him.  They can see for him themselves and then we can have a meeting set up to discuss goals of care with them  Total time spent is 35 minutes with more than 25-minute spent face-to-face reviewing care plan with the patient with nursing  as well as dietitian along with .  Goals of care discussion was done along with tube feeding issues were also reviewed as outlined in my note above    History     Patient is a very pleasant 40 y.o. male who is admitted to TCU  Patient has been admitted after a very lengthy more than 60-day stay in the hospital.  He presented following PEA/VF arrest in the community.  He was admitted and noted to have anterior acute ST SUKI.  He had multiple episodes of cardiac arrest  He required ECMO support and was emergently taken for cardiac catheterization which showed a thrombotic occlusion of the LAD which was treated with aspiration thrombectomy and PCI with drug-eluting stent of the proximal LAD.  Blood pressures remain low frequently because of autonomic insufficiency  He is on medical management  He is compliant with his  medications    He also suffered from hypoxic brain injury.  Initial CT of the head was negative but EEG shows severe diffuse encephalopathy without seizure or epilepsy.  Repeat CT did show multifocal acute/subacute cerebral infarcts.  And brain injury which is consistent with hypoxic brain injury.  No improvement noted in the TCU he continues to be pretty nonresponsive and nonverbal.    He is more awake and alert now but does not maintain any reasonable communication.  He does not have any verbal and nonverbal form of communication noted  He does not make eye contact  He does not follow any command  He has had significant dysregulation noted.  Staff is reporting a difficulty with tube feeding now because of agitation.  His tube feeding had to be held and now history was completely pulled out.  Unfortunately based on my discussion with speech therapy is nowhere close to starting oral trials.  In light of his difficulty with tube feeding attempt was made to give him IV fluids unfortunately he pulled his IV fluids out immediately  He was noted to have aspiration pneumonitis which was treated.  Subsequently due to profound dysphagia he had PEG tube placement done and initiated on tube feeding.  Unfortunately had recurrent emesis after PEG tube placement.  A CT of his chest abdomen pelvis was done which did not show any source for his emesis.  Zofran has been added for relief.  He is also on Protonix.  Tube feeding is going well  He has been reporting that his tube feeding has been going well.  He has not had any emesis.    He had persistent fever in the hospital with elevated white count.  He had extensive work-up done x-ray will did not show any findings CT was negative.  Repeat C. difficile was negative and UA negative he did undergo 7 days of Zosyn which she is currently done with.  He currently remains afebrile  Recheck CBC done in the TCU shows a white count of 8.3.    He has had multiple falls in the TCU.   Placement efforts have been unsuccessful and finally has been transferred to mattress on the floor.  Unfortunately remains hard to care for in light of his profound cognitive impairment.  No attempt for positioning has been successful.  He has been tried on a Broda chair but that has been unsuccessful nursing eventually has placed him on a mattress because he constantly rolled out on the bed.  He continues to have involuntary scissoring-like movement of his extremities      Past Medical History     Active Ambulatory (Non-Hospital) Problems    Diagnosis   ? Acute respiratory failure with hypoxia (H)   ? Gastrojejunostomy tube status (H)   ? Hypoxic brain injury (H)   ? Dysphagia   ? Cardiac arrest (H)   ? Primary Lymphadenitis   ? Nicotine Dependence   ? Backache     Past Medical History:   Diagnosis Date   ? Acute respiratory failure with hypoxia (H)    ? LOWELL (acute kidney injury) (H)    ? Cardiac arrest (H)    ? Dysphagia    ? HTN (hypertension)    ? Hypoxic ischemic encephalopathy    ? NSTEMI (non-ST elevated myocardial infarction) (H)        Past Social History     Reviewed, and he  reports that he has been smoking. He has never used smokeless tobacco. He reports current alcohol use. He reports that he does not use drugs.    Family History     Reviewed, and family history includes Diabetes in his maternal aunt; Lung cancer in his maternal grandfather; Other in his father.    Medication List   Post Discharge Medication Reconciliation Status: discharge medications reconciled, continue medications without change   Current Outpatient Medications on File Prior to Visit   Medication Sig Dispense Refill   ? acetaminophen (TYLENOL) 650 mg/20.3 mL Soln 650 mg every 6 (six) hours as needed. Via PEG-tube      ? amantadine HCL (SYMMETREL) 50 mg/5 mL solution 100 mg every 12 (twelve) hours. Via PEG-tube     ? aspirin 81 MG EC tablet 81 mg daily. Via PEG-tube     ? atorvastatin (LIPITOR) 40 MG tablet 40 mg at bedtime. Via  PEG-tube     ? bromocriptine (PARLODEL) 2.5 mg tablet 2.5 mg 2 (two) times a day. Via PEG-tube     ? chlorhexidine (PERIDEX) 0.12 % solution Apply 15 mL to the mouth or throat 4 (four) times a day.     ? furosemide (LASIX) 10 mg/mL solution 10 mg every other day. Via PEG-tube, hold if SBP <90     ? levETIRAcetam (KEPPRA) 100 mg/mL solution 1,000 mg 2 (two) times a day. Via PEG-tube     ? lisinopriL (PRINIVIL,ZESTRIL) 2.5 MG tablet 1.25 mg daily. Via PEG-tube, hold if SBP <90     ? LORazepam (ATIVAN) 0.5 MG tablet 1 tablet (0.5 mg total) by G-tube route 4 (four) times a day. 120 tablet 0   ? metoclopramide (REGLAN) 5 MG tablet 5 mg 2 (two) times a day. Via PEG-tube     ? metoprolol tartrate (LOPRESSOR) 25 MG tablet 6.25 mg 2 (two) times a day. Via PEG-tube, hold if SBP <90, HR <60     ? neomycin-bacitracin-polymyxin B (NEOSPORIN) 3.5-400-5,000 mg-unit-unit OiPk ointment Apply 1 application topically 2 (two) times a day.     ? omeprazole (PRILOSEC) 2 mg/mL SusR suspension 40 mg daily before breakfast. Via PEG-tube     ? pantoprazole (PROTONIX) 40 MG tablet 40 mg daily. Via PEG-tube     ? potassium bicarb-citric acid 20 mEq TbEF 20 mEq daily. Via PEG-tube     ? QUEtiapine (SEROQUEL) 25 MG tablet 12.5 mg 3 (three) times a day. Via PEG-tube     ? ticagrelor (BRILINTA) 90 mg Tab 90 mg 2 (two) times a day. Via PEG-tube     ? traZODone (DESYREL) 50 MG tablet 50 mg at bedtime. Via PEG-tube     ? white petrolatum (AQUAPHOR ORIGINAL) 41 % Oint Apply 1 application topically 2 (two) times a day. Apply to feet/legs       No current facility-administered medications on file prior to visit.        Allergies     No Known Allergies    Review of Systems   A comprehensive review of 14 systems was done. Pertinent findings noted here and in history of present illness. All the rest negative.  Constitutional: Negative.  Negative for fever, chills, he has activity change, appetite change and fatigue.   He has now been walking and taking a  few steps.  He has been talking also  HENT: Negative for congestion and facial swelling.    Eyes: Negative for photophobia, redness and visual disturbance.   Respiratory: Negative for cough and chest tightness.    Cardiovascular: Negative for chest pain, palpitations and leg swelling.   Gastrointestinal: Negative for nausea, diarrhea, constipation, blood in stool and abdominal distention.   Genitourinary: Negative.    Musculoskeletal: Negative.  Hard to assess he does move his legs and arms spontaneously but not to command  Skin: Negative.    Neurological: Negative for dizziness, tremors, syncope, weakness, light-headedness and headaches.   Constant scissoring like movement of his legs and arms noted.  Significant excoriation of the right hip area noted  Pulling his tube feeding out  Nonresponsive  Hematological: Does not bruise/bleed easily.   Psychiatric/Behavioral: Negative.  Unable to assess as patient does not cooperate  However today noted to be talking spontaneously  Staff reporting that he is not sleeping well.  He has significant anxiety and frequently rolls out of bed      Physical Exam     Recent Vitals 8/18/2020   Weight 173 lbs 6 oz   BP 98/88   Pulse 111   Temp 97.5   Temp src -   SpO2 -   Some recent data might be hidden   Weight is 173 pounds blood pressure 93/61 temp 98 pulse 80    Constitutional:  appears well-developed.   HEENT:  Normocephalic and atraumatic.  Eyes: Conjunctivae and EOM are normal. Pupils are equal, round, and reactive to light. No discharge.  No scleral icterus. Nose normal. Mouth/Throat: Oropharynx is clear and moist. No oropharyngeal exudate.    NECK: Normal range of motion. Neck supple. No JVD present. No tracheal deviation present. No thyromegaly present.   CARDIOVASCULAR: Normal rate, regular rhythm and intact distal pulses.  Exam reveals no gallop and no friction rub.  Systolic murmur present.  PULMONARY: Effort normal and breath sounds normal. No respiratory distress.No  Wheezing or rales.  ABDOMEN: Soft. Bowel sounds are normal. No distension and no mass.  There is no tenderness. There is no rebound and no guarding. No HSM.  MUSCULOSKELETAL: Normal range of motion. No edema and no tenderness. Mild kyphosis, no tenderness.  Hard to assess as patient does not respond to verbal commands or follow them.  He seems to be spontaneously moving his arms and legs though  LYMPH NODES: Has no cervical, supraclavicular, axillary and groin adenopathy.   NEUROLOGICAL: Alert and oriented to NONE. No cranial nerve deficit.  Normal muscle tone. Coordination normal.   He does not make any eye contact.  Does not follow verbal commands either  GENITOURINARY: Deferred exam.  SKIN: Skin is warm and dry. No rash noted. No erythema. No pallor.   EXTREMITIES: No cyanosis, no clubbing, no edema. No Deformity.  PSYCHIATRIC: mood, affect and behavior is hard to assess because of noncommunication.      Lab Results     Hemoglobin 10.6.  White count was 11.5 with platelets of 336.  Electrolytes were normal.  Calcium 9.7 magnesium 2.2.  CR 0.76  4 days 119 AST 27 ALT 45  No results found for this or any previous visit (from the past 240 hour(s)).    Imaging Results     Echocardiogram on 6/10/2020 shows LV ejection fraction of 36%.  LAD territory akinesis with right ventricular function chamber size wall movement and thickness are normal.    Coronary angiogram shows 100% stenosis of the proximal LAD lesion    CT of the head was negative    DARIO Vargas

## 2021-06-20 NOTE — LETTER
Letter by Fouzia High CNP at      Author: Fouzia High CNP Service: -- Author Type: --    Filed:  Encounter Date: 10/2/2020 Status: (Other)         Patient: Scot Bishop   MR Number: [id number]   YOB: 1980   Date of Visit: 10/2/2020       [facility]      NAME:  Scot Bishop             :  1980    MRN: [id number]    CODE STATUS:      [facility] [[id number]]       CHIEF COMPLAIN/REASON FOR VISIT:  Chief Complaint   Patient presents with   ? Problem Visit     dysphagia       HISTORY OF PRESENT ILLNESS: Scot Bishop is a 40 y.o. male being seen for per ST request due to his dysphagia swallow results. He did poorly due to his brain injury, agitation and short attention span. . She would like to see if we could get a BS swallow eval.   [place] [facility] in May 2020. At that juncture he was in cardiac cath lab and suffered a hypoxic brain injury, high temp and thrombotic event. He is seen in his room today. Non verbal and did not track with his eyes. He will make eye contact. He has repetitive movement while in bed.He has repetitive laughing today. He varies between crying and laughing per nursing.  Moving legs and arms. Per nursing he  Slid from bed over week end, no apparent injuries, they are placing a scoop mattress for preventive falls. . He will need ongoing rehab services with PT/OT/ST. Has Gtube due to his dysphagia, which reviewed that weight has been stable.. Unfortunealy due to Scot Hypoxic brain injury he will not be able to understand hygiene education and nursing will need to meet these needs for pt. Therapy continues to work with him, seen ambualting with SBA two.Staff to anticipate needs and provide good elizabeth care two times a day and prn.   continues to work with family towards DC to a group home after TCU stay as patient will be unable to care for self, remains dependant on  staff for all ADL and mobility as well as GTube for feedings.Wt stable at 167. Pt does continue with rehab services. Reviewed with  potential feeding program for Scot.    No Known Allergies:     Current Outpatient Medications   Medication Sig   ? acetaminophen (TYLENOL) 650 mg/20.3 mL Soln 650 mg every 6 (six) hours as needed. Via PEG-tube    ? amantadine HCL (SYMMETREL) 50 mg/5 mL solution 100 mg every 12 (twelve) hours. Via PEG-tube   ? aspirin 81 MG EC tablet 81 mg daily. Via PEG-tube   ? atorvastatin (LIPITOR) 40 MG tablet 40 mg at bedtime. Via PEG-tube   ? bromocriptine (PARLODEL) 2.5 mg tablet 2.5 mg 2 (two) times a day. Via PEG-tube   ? chlorhexidine (PERIDEX) 0.12 % solution Apply 15 mL to the mouth or throat 4 (four) times a day.   ? levETIRAcetam (KEPPRA) 100 mg/mL solution 1,000 mg 2 (two) times a day. Via PEG-tube   ? lisinopriL (PRINIVIL,ZESTRIL) 2.5 MG tablet 1.25 mg daily. Via PEG-tube, hold if SBP <90   ? LORazepam (ATIVAN) 0.5 MG tablet 1 tablet (0.5 mg total) by G-tube route 4 (four) times a day.   ? metoclopramide (REGLAN) 5 MG tablet 5 mg 2 (two) times a day. Via PEG-tube   ? metoprolol tartrate (LOPRESSOR) 25 MG tablet 6.25 mg 2 (two) times a day. Via PEG-tube, hold if SBP <90, HR <60   ? neomycin-bacitracin-polymyxin B (NEOSPORIN) 3.5-400-5,000 mg-unit-unit OiPk ointment Apply 1 application topically 2 (two) times a day.   ? omeprazole (PRILOSEC) 2 mg/mL SusR suspension 40 mg daily before breakfast. Via PEG-tube   ? pantoprazole (PROTONIX) 40 MG tablet 40 mg daily. Via PEG-tube   ? potassium bicarb-citric acid 20 mEq TbEF 20 mEq daily. Via PEG-tube   ? QUEtiapine (SEROQUEL) 25 MG tablet 12.5 mg 3 (three) times a day. Via PEG-tube   ? ticagrelor (BRILINTA) 90 mg Tab 90 mg 2 (two) times a day. Via PEG-tube   ? traZODone (DESYREL) 50 MG tablet 50 mg at bedtime. Via PEG-tube   ? white petrolatum (AQUAPHOR ORIGINAL) 41 % Oint Apply 1 application topically 2 (two) times a day. Apply to feet/legs          REVIEW OF SYSTEMS:  Unable to verbalize due to aphasia    PHYSICAL EXAMINATION:  Vitals:    10/03/20 0848   BP: 103/66   Pulse: 99   Temp: 98.1  F (36.7  C)   Weight: 167 lb (75.8 kg)         GENERAL: Does not follow simple commands, makes eye contact, non verbal.  HEENT: Head is normocephalic with normal hair distribution. No evidence of trauma. Ears: No acute purulent discharge. Eyes: Conjunctivae pink with no scleral jaundice. Nose: Normal mucosa and septum. NECK: Supple with no cervical or supraclavicular lymphadenopathy. Trachea is midline, healing trach stoma  EXTREMITIES: Atraumatic. Full range of motion on both upper and lower extremities, there is no tenderness to palpation, no pedal edema, no cyanosis or clubbing, no calf tenderness, normal cap refill, no joint swelling.  SKIN: Warm and dry, no erythema noted, abdomen with g tube  NEUROLOGICAL: The patient is oriented to person, TBI  Social Distancing with PPE practiced due to Covid 19    LABS:    Lab Results   Component Value Date    WBC 8.3 08/06/2020    HGB 11.0 (L) 08/06/2020    HCT 34.9 (L) 08/06/2020    MCV 90 08/06/2020     08/06/2020       Results for orders placed or performed in visit on 09/17/20   Basic Metabolic Panel   Result Value Ref Range    Sodium 141 136 - 145 mmol/L    Potassium 4.5 3.5 - 5.0 mmol/L    Chloride 105 98 - 107 mmol/L    CO2 28 22 - 31 mmol/L    Anion Gap, Calculation 8 5 - 18 mmol/L    Glucose 91 70 - 125 mg/dL    Calcium 9.8 8.5 - 10.5 mg/dL    BUN 11 8 - 22 mg/dL    Creatinine 0.78 0.70 - 1.30 mg/dL    GFR MDRD Af Amer >60 >60 mL/min/1.73m2    GFR MDRD Non Af Amer >60 >60 mL/min/1.73m2           No results found for: HGBA1C  No results found for: RGMJXYJH94HK  No results found for: EBFWLZDB88    ASSESSMENT/PLAN:  1. Dysphagia, unspecified type    2. G tube feedings (H)      1.. Dysphagia: Spoke to Lucy JONES today and we did review results of his BS swallow. He did not pass study. At this juncture hs attention  span and distraction/agitation is self limiting. We will have ST with family guidance of risk benifets review a soft food feeding protocol with ST only.  Nursing will need strict VS and Lung assessment QS and we will stopp in the event he does not tolerate well.    2. TF: Followed by dietary and ST, Nursing manages TFing as ordered. Wt stable at 167.     Electronically signed by:  Fouzia High CNP  This progress note was completed using Dragon software and there may be grammatical errors.

## 2021-06-20 NOTE — LETTER
Letter by Cortney Bahena MBBS at      Author: Cortney Bahena MBBS Service: -- Author Type: --    Filed:  Encounter Date: 9/29/2020 Status: (Other)         Patient: Scot Bishop   MR Number: [id number]   YOB: 1980   Date of Visit: 9/29/2020       [facility]  note      Patient: Scot Bishop  MRN: [id number]  Date of Service: 9/29/2020      [facility] [[id number]]  Reason for Visit     Chief Complaint   Patient presents with   ? Problem Visit   Follow-up on fever /low bp    Code Status     FULL CODE    Assessment     -Persistent hypotension low blood pressures noted-  -Low-grade temperatures of unclear etiology  -Acute VF arrest secondary to acute ST SUKI  -Acute LAD occlusion with underlying history of coronary artery disease  -Hypoxic brain injury  - Severe dysphagia currently discharged on tube feeding; now with staff reporting patient constantly pulling tube feeding out  -Aspiration pneumonia currently resolved  -Acute hypoxic respiratory failure currently resolved  -History of lengthy stay in the hospital with multiple procedures including requiring intubation and ECMO support from 5 17-5 19 with tracheostomy and PEG placement.  -LOWELL currently resolved      Plan     Patient has been admitted to the TCU as a transfer from the hospital where he had a lengthy stay of more than 2 months.  He is on medical management.  He was seen at the request of nursing because of low blood pressure   This is believed to be secondary to autonomic insufficiency.  Recently seen by nursing and the nurse practitioner and taken off Lasix.  Blood pressures continue to be labile.  Reviewed care plan with speech therapy he is scheduled for a swallow study tomorrow.    Ativan 0.5MG will be scheduled prior to the procedure as they feel he may not cooperate  As per my review of care plan with a speech therapy no cognitive improvement has been noted.  He has hypoxic brain injury but continues to  have some behaviors.  Frequently has been quite agitated  Nursing is reporting some combativeness.  He has some issues with caregivers and generally will hold her arms or resist cares as per them.  I did talk to nursing to see if they can try some alternative modalities including a weighted blanket or a sensory blanket to see if those will help him he could even get a stuffed animal to calm him down.  Currently awaiting placement in long-term care        History     Patient is a very pleasant 40 y.o. male who is admitted to TCU  Patient has been admitted after a very lengthy more than 60-day stay in the hospital.  He presented following PEA/VF arrest in the community.  He was admitted and noted to have anterior acute ST SUKI.  He had multiple episodes of cardiac arrest  He required ECMO support and was emergently taken for cardiac catheterization which showed a thrombotic occlusion of the LAD which was treated with aspiration thrombectomy and PCI with drug-eluting stent of the proximal LAD.  He has been discharged on medical management.  He was seen by the nurse practitioner and taken off the Lasix that he is on because of persistent low blood pressures.  No improvement noted continues to run low BP      He also suffered from hypoxic brain injury.  Initial CT of the head was negative but EEG shows severe diffuse encephalopathy without seizure or epilepsy.  Repeat CT did show multifocal acute/subacute cerebral infarcts.  And brain injury which is consistent with hypoxic brain injury.  No improvement noted in the TCU he continues to be pretty nonresponsive and nonverbal.    He continues to be nonresponsive I did have a discussion with the speech therapist to discuss how he is doing and apparently she has not seen any cognitive improvement so far.  Family has been visiting him now    He remains on tube feeding.  Recently has had a lot of difficulties with tube feeding.  He was sent him back in the hospital for G-tube  replacement.  Due to his repeated difficulty with G-tube he has been switched from 24/7 feeding to bolus feeding during the daytime which has worked much better.  He is now scheduled for a swallow test tomorrow speech therapy did talk to me they feel he will not participate really sedated with Ativan    He has had multiple falls in the TCU.  Placement efforts have been unsuccessful and finally has been transferred to mattress on the floor.  Unfortunately remains hard to care for in light of his profound cognitiv impairment.  He continues to have significant autonomic instability with frequent low temperatures and high temperatures noted.  He has had some behavioral issues which are reviewed with nursing also          Past Medical History     Active Ambulatory (Non-Hospital) Problems    Diagnosis   ? Agitation   ? Low BP   ? G tube feedings (H)   ? Yeast infection   ? Impetigo any site   ? Hypoxic brain damage (H)   ? Acute respiratory failure with hypoxia (H)   ? Gastrojejunostomy tube status (H)   ? Hypoxic brain injury (H)   ? Dysphagia   ? Cardiac arrest (H)   ? Primary Lymphadenitis   ? Nicotine Dependence   ? Backache     Past Medical History:   Diagnosis Date   ? Acute respiratory failure with hypoxia (H)    ? LOWELL (acute kidney injury) (H)    ? Cardiac arrest (H)    ? Dysphagia    ? HTN (hypertension)    ? Hypoxic ischemic encephalopathy    ? NSTEMI (non-ST elevated myocardial infarction) (H)        Past Social History     Reviewed, and he  reports that he has been smoking. He has never used smokeless tobacco. He reports current alcohol use. He reports that he does not use drugs.    Family History     Reviewed, and family history includes Diabetes in his maternal aunt; Lung cancer in his maternal grandfather; Other in his father.    Medication List   Post Discharge Medication Reconciliation Status: discharge medications reconciled, continue medications without change   Current Outpatient Medications on File  Prior to Visit   Medication Sig Dispense Refill   ? acetaminophen (TYLENOL) 650 mg/20.3 mL Soln 650 mg every 6 (six) hours as needed. Via PEG-tube      ? amantadine HCL (SYMMETREL) 50 mg/5 mL solution 100 mg every 12 (twelve) hours. Via PEG-tube     ? aspirin 81 MG EC tablet 81 mg daily. Via PEG-tube     ? atorvastatin (LIPITOR) 40 MG tablet 40 mg at bedtime. Via PEG-tube     ? bromocriptine (PARLODEL) 2.5 mg tablet 2.5 mg 2 (two) times a day. Via PEG-tube     ? chlorhexidine (PERIDEX) 0.12 % solution Apply 15 mL to the mouth or throat 4 (four) times a day.     ? levETIRAcetam (KEPPRA) 100 mg/mL solution 1,000 mg 2 (two) times a day. Via PEG-tube     ? lisinopriL (PRINIVIL,ZESTRIL) 2.5 MG tablet 1.25 mg daily. Via PEG-tube, hold if SBP <90     ? LORazepam (ATIVAN) 0.5 MG tablet 1 tablet (0.5 mg total) by G-tube route 4 (four) times a day. 120 tablet 0   ? metoclopramide (REGLAN) 5 MG tablet 5 mg 2 (two) times a day. Via PEG-tube     ? metoprolol tartrate (LOPRESSOR) 25 MG tablet 6.25 mg 2 (two) times a day. Via PEG-tube, hold if SBP <90, HR <60     ? neomycin-bacitracin-polymyxin B (NEOSPORIN) 3.5-400-5,000 mg-unit-unit OiPk ointment Apply 1 application topically 2 (two) times a day.     ? omeprazole (PRILOSEC) 2 mg/mL SusR suspension 40 mg daily before breakfast. Via PEG-tube     ? pantoprazole (PROTONIX) 40 MG tablet 40 mg daily. Via PEG-tube     ? potassium bicarb-citric acid 20 mEq TbEF 20 mEq daily. Via PEG-tube     ? QUEtiapine (SEROQUEL) 25 MG tablet 12.5 mg 3 (three) times a day. Via PEG-tube     ? ticagrelor (BRILINTA) 90 mg Tab 90 mg 2 (two) times a day. Via PEG-tube     ? traZODone (DESYREL) 50 MG tablet 50 mg at bedtime. Via PEG-tube     ? white petrolatum (AQUAPHOR ORIGINAL) 41 % Oint Apply 1 application topically 2 (two) times a day. Apply to feet/legs       No current facility-administered medications on file prior to visit.        Allergies     No Known Allergies    Review of Systems   A  comprehensive review of 14 systems was done. Pertinent findings noted here and in history of present illness. All the rest negative.  Constitutional: Negative.  Negative for fever, chills, he has activity change, appetite change and fatigue.   He has now been walking and taking a few steps.  He has been talking also  HENT: Negative for congestion and facial swelling.    Eyes: Negative for photophobia, redness and visual disturbance.   Respiratory: Negative for cough and chest tightness.    Cardiovascular: Negative for chest pain, palpitations and leg swelling.   Gastrointestinal: Negative for nausea, diarrhea, constipation, blood in stool and abdominal distention.   Genitourinary: Negative.    Musculoskeletal: Negative.  Hard to assess he does move his legs and arms spontaneously but not to command  Skin: Negative.    Neurological: Negative for dizziness, tremors, syncope, weakness, light-headedness and headaches.   Constant scissoring like movement of his legs and arms noted.  Nonresponsive  Hematological: Does not bruise/bleed easily.   Psychiatric/Behavioral: Negative.  Unable to assess as patient does not cooperate  However today noted to be talking spontaneously  Staff reporting that he is not sleeping well.  He has significant anxiety and frequently rolls out of bed      Physical Exam     Recent Vitals 9/27/2020   Weight 167 lbs   BP 98/64   Pulse 80   Temp 98.2   Some recent data might be hidden   Weight is 168 pounds blood pressure 80/46 temp 99  pulse 105    Constitutional:  appears well-developed.   HEENT:  Normocephalic and atraumatic.  Eyes: Conjunctivae and EOM are normal. Pupils are equal, round, and reactive to light. No discharge.  No scleral icterus. Nose normal. Mouth/Throat: Oropharynx is clear and moist. No oropharyngeal exudate.    NECK: Normal range of motion. Neck supple. No JVD present. No tracheal deviation present. No thyromegaly present.   CARDIOVASCULAR: Normal rate, regular rhythm and  intact distal pulses.  Exam reveals no gallop and no friction rub.  Systolic murmur present.  PULMONARY: Effort normal and breath sounds normal. No respiratory distress.No Wheezing or rales.  ABDOMEN: Soft. Bowel sounds are normal. No distension and no mass.  There is no tenderness. There is no rebound and no guarding. No HSM.  Does have a G-tube which is somewhat sore around the insertion site  MUSCULOSKELETAL: Normal range of motion. No edema and no tenderness. Mild kyphosis, no tenderness.  Hard to assess as patient does not respond to verbal commands or follow them.  He seems to be spontaneously moving his arms and legs though  LYMPH NODES: Has no cervical, supraclavicular, axillary and groin adenopathy.   NEUROLOGICAL: Alert and oriented to NONE. No cranial nerve deficit.  Normal muscle tone. Coordination normal.   He does not make any eye contact.  Does not follow verbal commands either  GENITOURINARY: Deferred exam.  SKIN: Skin is warm and dry. No rash noted. No erythema. No pallor.   Excoriation of the skin in the abdomen noted  EXTREMITIES: No cyanosis, no clubbing, no edema. No Deformity.  PSYCHIATRIC: mood, affect and behavior is hard to assess because of noncommunication.      Lab Results     Results for orders placed or performed in visit on 09/17/20   Basic Metabolic Panel   Result Value Ref Range    Sodium 141 136 - 145 mmol/L    Potassium 4.5 3.5 - 5.0 mmol/L    Chloride 105 98 - 107 mmol/L    CO2 28 22 - 31 mmol/L    Anion Gap, Calculation 8 5 - 18 mmol/L    Glucose 91 70 - 125 mg/dL    Calcium 9.8 8.5 - 10.5 mg/dL    BUN 11 8 - 22 mg/dL    Creatinine 0.78 0.70 - 1.30 mg/dL    GFR MDRD Af Amer >60 >60 mL/min/1.73m2    GFR MDRD Non Af Amer >60 >60 mL/min/1.73m2             DARIO Vargas

## 2021-06-20 NOTE — LETTER
Letter by Fouzia High CNP at      Author: Fouzia High CNP Service: -- Author Type: --    Filed:  Encounter Date: 2020 Status: (Other)         Patient: Scot Bishop   MR Number: [id number]   YOB: 1980   Date of Visit: 2020       [facility]      NAME:  Scot Bishop             :  1980    MRN: [id number]    CODE STATUS:      FACILITY: [facility] [[id number]]       CHIEF COMPLAIN/REASON FOR VISIT:  Chief Complaint   Patient presents with   ? Problem Visit     AGITATION       HISTORY OF PRESENT ILLNESS: Scot Bishop is a 40 y.o. male being seen for per nursing request for blood pressures consistently in high 90s.  We will go ahead and DC his lisinopril, that is only 2.5 mg daily.  We did recently DC his Lasix as well..  Does not appear to be pain.  Staff is one-to-one in him.  Also noted that Scot is very agitated this morning and crying out  [facility] [place] [facility] [place] [facility] in May 2020. At that juncture he was in cardiac cath lab and suffered a hypoxic brain injury, high temp and thrombotic event. He is seen in his room today. Non verbal and did not track with his eyes. He will make eye contact. He has repetitive movement while in bed.He has repetitive laughing today. He varies between crying and laughing per nursing.  Moving legs and arms. Per nursing he  Slid from bed over week end, no apparent injuries, they are placing a scoop mattress for preventive falls. . He will need ongoing rehab services with PT/OT/ST. Has Gtube due to his dysphagia, which reviewed that weight has been stable.. Unfortunealy due to Scot Hypoxic brain injury he will not be able to understand hygiene education and nursing will need to meet these needs for pt. Therapy continues to work with him, seen ambualting with SBA two.Staff to anticipate needs and provide good elizabeth care two times a day and prn.   continues to work with  family towards DC to a group home after TCU stay as patient will be unable to care for self, remains dependant on staff for all ADL and mobility as well as GTube for feedings.. Pt does continue with rehab services    No Known Allergies:     Current Outpatient Medications   Medication Sig   ? acetaminophen (TYLENOL) 650 mg/20.3 mL Soln 650 mg every 6 (six) hours as needed. Via PEG-tube    ? amantadine HCL (SYMMETREL) 50 mg/5 mL solution 100 mg every 12 (twelve) hours. Via PEG-tube   ? aspirin 81 MG EC tablet 81 mg daily. Via PEG-tube   ? atorvastatin (LIPITOR) 40 MG tablet 40 mg at bedtime. Via PEG-tube   ? bromocriptine (PARLODEL) 2.5 mg tablet 2.5 mg 2 (two) times a day. Via PEG-tube   ? chlorhexidine (PERIDEX) 0.12 % solution Apply 15 mL to the mouth or throat 4 (four) times a day.   ? levETIRAcetam (KEPPRA) 100 mg/mL solution 1,000 mg 2 (two) times a day. Via PEG-tube   ? lisinopriL (PRINIVIL,ZESTRIL) 2.5 MG tablet 1.25 mg daily. Via PEG-tube, hold if SBP <90   ? LORazepam (ATIVAN) 0.5 MG tablet 1 tablet (0.5 mg total) by G-tube route 4 (four) times a day.   ? metoclopramide (REGLAN) 5 MG tablet 5 mg 2 (two) times a day. Via PEG-tube   ? metoprolol tartrate (LOPRESSOR) 25 MG tablet 6.25 mg 2 (two) times a day. Via PEG-tube, hold if SBP <90, HR <60   ? neomycin-bacitracin-polymyxin B (NEOSPORIN) 3.5-400-5,000 mg-unit-unit OiPk ointment Apply 1 application topically 2 (two) times a day.   ? omeprazole (PRILOSEC) 2 mg/mL SusR suspension 40 mg daily before breakfast. Via PEG-tube   ? pantoprazole (PROTONIX) 40 MG tablet 40 mg daily. Via PEG-tube   ? potassium bicarb-citric acid 20 mEq TbEF 20 mEq daily. Via PEG-tube   ? QUEtiapine (SEROQUEL) 25 MG tablet 12.5 mg 3 (three) times a day. Via PEG-tube   ? ticagrelor (BRILINTA) 90 mg Tab 90 mg 2 (two) times a day. Via PEG-tube   ? traZODone (DESYREL) 50 MG tablet 50 mg at bedtime. Via PEG-tube   ? white petrolatum (AQUAPHOR ORIGINAL) 41 % Oint Apply 1 application  topically 2 (two) times a day. Apply to feet/legs         REVIEW OF SYSTEMS:  Unable to verbalize due to aphasia    PHYSICAL EXAMINATION:  Vitals:    09/21/20 1817   BP: 99/57   Pulse: 96   Temp: 98.5  F (36.9  C)   Weight: 168 lb 6.4 oz (76.4 kg)         GENERAL: Does not follow simple commands, makes eye contact, non verbal.  HEENT: Head is normocephalic with normal hair distribution. No evidence of trauma. Ears: No acute purulent discharge. Eyes: Conjunctivae pink with no scleral jaundice. Nose: Normal mucosa and septum. NECK: Supple with no cervical or supraclavicular lymphadenopathy. Trachea is midline, healing trach stoma  EXTREMITIES: Atraumatic. Full range of motion on both upper and lower extremities, there is no tenderness to palpation, no pedal edema, no cyanosis or clubbing, no calf tenderness, normal cap refill, no joint swelling.  SKIN: Warm and dry, no erythema noted, abdomen with g tube  NEUROLOGICAL: The patient is oriented to person, TBI      LABS:    Lab Results   Component Value Date    WBC 8.3 08/06/2020    HGB 11.0 (L) 08/06/2020    HCT 34.9 (L) 08/06/2020    MCV 90 08/06/2020     08/06/2020       Results for orders placed or performed in visit on 09/17/20   Basic Metabolic Panel   Result Value Ref Range    Sodium 141 136 - 145 mmol/L    Potassium 4.5 3.5 - 5.0 mmol/L    Chloride 105 98 - 107 mmol/L    CO2 28 22 - 31 mmol/L    Anion Gap, Calculation 8 5 - 18 mmol/L    Glucose 91 70 - 125 mg/dL    Calcium 9.8 8.5 - 10.5 mg/dL    BUN 11 8 - 22 mg/dL    Creatinine 0.78 0.70 - 1.30 mg/dL    GFR MDRD Af Amer >60 >60 mL/min/1.73m2    GFR MDRD Non Af Amer >60 >60 mL/min/1.73m2           No results found for: HGBA1C  No results found for: [id number]  No results found for: IURLEUSP90    ASSESSMENT/PLAN:  1. Hypoxic brain damage (H)    2. Agitation      1.Hypoxic brain injury:   We currently continues with rehab at this juncture wheelchair-bound he will fall risk due to restlessness in bed and  has a scoop mattress. Nursing staff does walk him with gait belt and guidance. His ambulating has an odd goose step when walking.  Met with our therapist today, and we discussed [facility] [place] [facility] and have asked her all of September notes to review Scot could be a candidate for their TBI area.  Scot has had an increase in agitation this morning have to be one-to-one with staff as he is crying and agitated      2. Agitation: Very inconsolable.  Instructed RN on duty to give an additional one-time dose of Ativan, patient was laid down and improvement noted.       Electronically signed by:  Fouzia High CNP  This progress note was completed using Dragon software and there may be grammatical errors.

## 2021-06-20 NOTE — LETTER
Letter by Cortney Bahena MBBS at      Author: Cortney Bahena MBBS Service: -- Author Type: --    Filed:  Encounter Date: 8/4/2020 Status: (Other)         Patient: Scot Bishop   MR Number: [id number]   YOB: 1980   Date of Visit: 8/4/2020       [facility] Admission note      Patient: Scot Bishop  MRN: [id number]  Date of Service: 8/4/2020      [facility] [[id number]]  Reason for Visit     Chief Complaint   Patient presents with   ? H & P       Code Status     FULL CODE    Assessment     -Acute VF arrest secondary to acute ST SUKI  -Acute LAD occlusion with underlying history of coronary artery disease  -Hypoxic brain injury  - Severe dysphagia currently discharged on tube feeding  -Aspiration pneumonia currently resolved  -Acute hypoxic respiratory failure currently resolved  -History of lengthy stay in the hospital with multiple procedures including requiring intubation and ECMO support from 5 17-5 19 with tracheostomy and PEG placement.  -LOWELL currently resolved  -Persistent hypotension    Plan     Patient has been admitted to the TCU as a transfer from the hospital where he had a lengthy stay of more than 2 months.  He was in the ICU requiring support including ventilation oxygen as well as ECMO support and underwent multiple procedures including coronary angiogram and angioplasty.  He also underwent trach and PEG placement.  He had multiple consultations obtained including neurology, ENT palliative care infectious disease general surgery and interventional cardiology and including cardiology  He has been discharged to the nursing home for further rehab.  This is a man with no prior medical history who presented with recurrent cardiac arrest in the hospital with persistent V FIB  He underwent angiography with aspiration thrombectomy and placement of drug-eluting stent.  He had 100% stenosis of the proximal LAD lesion.  Unfortunately repeat echocardiogram post  procedure shows a stable ejection fraction with no improvement at 36%.  He was also noted to have his somewhat of a tachycardia with heart rates consistently greater than 100.  Cardiology suspect this is due to evolving MI and postarrest state.  Repeat transthoracic echocardiogram shows a EF of 36% and LAD territory akinesis.  Currently he is discharged on aspirin with Brilinta twice daily for 1 year.  Unfortunately insurance is refusing to cover his Brilinta.  Pharmacy is calling requesting a drug substitution.  At this point would defer this to cardiology.  Will not change his Brilinta until okayed by cardiology.  He is also on metoprolol lisinopril atorvastatin along with Lasix.  Close monitoring of INS and outs and weights.  He is having persistent hypotension with low blood pressures recorded which limits his ability to tolerate his cardiac meds.  He has been discharged on very low doses off his ACE inhibitor and beta-blocker with advised to hold it based on blood pressure parameters.  Unfortunately he has suffered severe anoxic brain injury and remains pretty much nonresponsive.  He does not make any eye contact.  He does not communicate in any form either by speaking or by writing or by gestures.  He also does not follow any commands.  Communication is very hard.  Unfortunately he has been discharged to the TCU and I suspect he may need alternative placement prior to discharge.  Currently on Seroquel for behavioral issues which will need to be monitored  He also has a tube feeding going on which she seems to be tolerating well.  Due to persistent emesis in the hospital he is on scheduled Reglan along with Zofran as needed  Staff is reporting that he is a 2 person assist with all his cares and is incontinent.  He had multiple complications in the hospital including thrombocytosis, shock liver acute renal insufficiency and hyponatremia and elevated lipases which seem to have corrected.  These will need to be  monitored with labs closely in the TCU.  Unfortunately is not doing well and his rehab potential remains very poor.  Suspect he will need placement unless his cognitive status does improve  Currently he is noncooperative with his cares and requires full cognitive and physical assistance for all his ADLs  Dietary and speech will be involved in his care      History     Patient is a very pleasant 40 y.o. male who is admitted to TCU  Patient has been admitted after a very lengthy more than 60-day stay in the hospital.  He presented following PEA/VF arrest in the community.  He was admitted and noted to have anterior acute ST SUKI.  He had multiple episodes of cardiac arrest  He required ECMO support and was emergently taken for cardiac catheterization which showed a thrombotic occlusion of the LAD which was treated with aspiration thrombectomy and PCI with drug-eluting stent of the proximal LAD.  Currently he has been discharged on medical management.    He also suffered from hypoxic brain injury.  Initial CT of the head was negative but EEG shows severe diffuse encephalopathy without seizure or epilepsy.  Repeat CT did show multifocal acute/subacute cerebral infarcts.  And brain injury which is consistent with hypoxic brain injury.  Unfortunately he remains pretty nonresponsive though he does move his limbs spontaneously he does not make any eye contact he is nonverbal and does not follow any commands.    He was noted to have aspiration pneumonitis which was treated.  Subsequently due to profound dysphagia he had PEG tube placement done and initiated on tube feeding.  Unfortunately had recurrent emesis after PEG tube placement.  A CT of his chest abdomen pelvis was done which did not show any source for his emesis.  Zofran has been added for relief.  He is also on Protonix.    He had persistent fever in the hospital with elevated white count.  He had extensive work-up done x-ray will did not show any findings CT was  negative.  Repeat C. difficile was negative and UA negative he did undergo 7 days of Zosyn which she is currently done with.    He had multiple other hospital issues including hyper natremia with elevated sodiums.  Acute renal insufficiency as well as thrombocytosis.  He also underwent shock liver.  Currently has been discharged to the TCU for strengthening rehab and eventually will need placement      Past Medical History     Active Ambulatory (Non-Hospital) Problems    Diagnosis   ? Hypoxic brain injury (H)   ? Dysphagia   ? Cardiac arrest (H)   ? Primary Lymphadenitis   ? Nicotine Dependence   ? Backache     No past medical history on file.    Past Social History     Reviewed, and he  reports that he has been smoking. He does not have any smokeless tobacco history on file. He reports current alcohol use. He reports that he does not use drugs.    Family History     Reviewed, and family history includes Diabetes in his maternal aunt.    Medication List   Post Discharge Medication Reconciliation Status: discharge medications reconciled, continue medications without change   Current Outpatient Medications on File Prior to Visit   Medication Sig Dispense Refill   ? acetaminophen (TYLENOL) 650 mg/20.3 mL Soln 650 mg every 6 (six) hours as needed. Via PEG-tube      ? amantadine HCL (SYMMETREL) 50 mg/5 mL solution 100 mg every 12 (twelve) hours. Via PEG-tube     ? aspirin 81 MG EC tablet 81 mg daily. Via PEG-tube     ? atorvastatin (LIPITOR) 40 MG tablet 40 mg at bedtime. Via PEG-tube     ? bromocriptine (PARLODEL) 2.5 mg tablet 2.5 mg 2 (two) times a day. Via PEG-tube     ? chlorhexidine (PERIDEX) 0.12 % solution Apply 15 mL to the mouth or throat 4 (four) times a day.     ? furosemide (LASIX) 10 mg/mL solution 10 mg every other day. Via PEG-tube, hold if SBP <90     ? levETIRAcetam (KEPPRA) 100 mg/mL solution 1,000 mg 2 (two) times a day. Via PEG-tube     ? lisinopriL (PRINIVIL,ZESTRIL) 2.5 MG tablet 1.25 mg daily. Via  PEG-tube, hold if SBP <90     ? LORazepam (ATIVAN) 1 MG tablet Take 1 tablet (1 mg total) by mouth 3 (three) times a day as needed for anxiety. (Patient taking differently: 0.5 mg 4 (four) times a day. Via PEG-tube) 12 tablet 0   ? metoclopramide (REGLAN) 5 MG tablet 5 mg 2 (two) times a day. Via PEG-tube     ? metoprolol tartrate (LOPRESSOR) 25 MG tablet 6.25 mg 2 (two) times a day. Via PEG-tube, hold if SBP <90, HR <60     ? neomycin-bacitracin-polymyxin B (NEOSPORIN) 3.5-400-5,000 mg-unit-unit OiPk ointment Apply 1 application topically 2 (two) times a day.     ? omeprazole (PRILOSEC) 2 mg/mL SusR suspension 40 mg daily before breakfast. Via PEG-tube     ? pantoprazole (PROTONIX) 40 MG tablet 40 mg daily. Via PEG-tube     ? potassium bicarb-citric acid 20 mEq TbEF 20 mEq daily. Via PEG-tube     ? QUEtiapine (SEROQUEL) 25 MG tablet 12.5 mg 3 (three) times a day. Via PEG-tube     ? ticagrelor (BRILINTA) 90 mg Tab 90 mg 2 (two) times a day. Via PEG-tube     ? traZODone (DESYREL) 50 MG tablet 50 mg at bedtime. Via PEG-tube     ? white petrolatum (AQUAPHOR ORIGINAL) 41 % Oint Apply 1 application topically 2 (two) times a day. Apply to feet/legs       No current facility-administered medications on file prior to visit.        Allergies     No Known Allergies    Review of Systems   A comprehensive review of 14 systems was done. Pertinent findings noted here and in history of present illness. All the rest negative.  Constitutional: Negative.  Negative for fever, chills, he has activity change, appetite change and fatigue.   HENT: Negative for congestion and facial swelling.    Eyes: Negative for photophobia, redness and visual disturbance.   Respiratory: Negative for cough and chest tightness.    Cardiovascular: Negative for chest pain, palpitations and leg swelling.   Gastrointestinal: Negative for nausea, diarrhea, constipation, blood in stool and abdominal distention.   Genitourinary: Negative.    Musculoskeletal:  Negative.  Hard to assess he does move his legs and arms spontaneously but not to command  Skin: Negative.    Neurological: Negative for dizziness, tremors, syncope, weakness, light-headedness and headaches.   Nonresponsive  Hematological: Does not bruise/bleed easily.   Psychiatric/Behavioral: Negative.        Physical Exam     Recent Vitals 8/4/2020   Weight -   BP 98/66   Pulse 97   Temp 97.8   Temp src -   SpO2 -       Constitutional:  appears well-developed.   HEENT:  Normocephalic and atraumatic.  Eyes: Conjunctivae and EOM are normal. Pupils are equal, round, and reactive to light. No discharge.  No scleral icterus. Nose normal. Mouth/Throat: Oropharynx is clear and moist. No oropharyngeal exudate.    NECK: Normal range of motion. Neck supple. No JVD present. No tracheal deviation present. No thyromegaly present.   CARDIOVASCULAR: Normal rate, regular rhythm and intact distal pulses.  Exam reveals no gallop and no friction rub.  Systolic murmur present.  PULMONARY: Effort normal and breath sounds normal. No respiratory distress.No Wheezing or rales.  ABDOMEN: Soft. Bowel sounds are normal. No distension and no mass.  There is no tenderness. There is no rebound and no guarding. No HSM.  MUSCULOSKELETAL: Normal range of motion. No edema and no tenderness. Mild kyphosis, no tenderness.  Hard to assess as patient does not respond to verbal commands or follow them.  He seems to be spontaneously moving his arms and legs though  LYMPH NODES: Has no cervical, supraclavicular, axillary and groin adenopathy.   NEUROLOGICAL: Alert and oriented to NONE. No cranial nerve deficit.  Normal muscle tone. Coordination normal.   He does not make any eye contact.  Patient is aphasic and does not talk.  Does not follow verbal commands either  GENITOURINARY: Deferred exam.  SKIN: Skin is warm and dry. No rash noted. No erythema. No pallor.   EXTREMITIES: No cyanosis, no clubbing, no edema. No Deformity.  PSYCHIATRIC: mood, affect  and behavior is hard to assess because of noncommunication.      Lab Results     Hemoglobin 10.6.  White count was 11.5 with platelets of 336.  Electrolytes were normal.  Calcium 9.7 magnesium 2.2.  CR 0.76  4 days 119 AST 27 ALT 45    Imaging Results     Echocardiogram on 6/10/2020 shows LV ejection fraction of 36%.  LAD territory akinesis with right ventricular function chamber size wall movement and thickness are normal.    Coronary angiogram shows 100% stenosis of the proximal LAD lesion    CT of the head was negative    DARIO Vargas

## 2021-06-20 NOTE — LETTER
Letter by Cortney Bahena MBBS at      Author: Cortney Bahena MBBS Service: -- Author Type: --    Filed:  Encounter Date: 8/13/2020 Status: (Other)         Patient: Scot Bishop   MR Number: [id number]   YOB: 1980   Date of Visit: 8/13/2020       [facility] Admission note      Patient: Scot Bishop  MRN: [id number]  Date of Service: 8/13/2020      [facility] [[id number]]  Reason for Visit     Chief Complaint   Patient presents with   ? Review Of Multiple Medical Conditions   Follow-up on multiple falls    Code Status     FULL CODE    Assessment     -Acute VF arrest secondary to acute ST SUKI  -Acute LAD occlusion with underlying history of coronary artery disease  -Hypoxic brain injury  - Severe dysphagia currently discharged on tube feeding  -Aspiration pneumonia currently resolved  -Acute hypoxic respiratory failure currently resolved  -History of lengthy stay in the hospital with multiple procedures including requiring intubation and ECMO support from 5 17-5 19 with tracheostomy and PEG placement.  -LOWELL currently resolved  -Persistent hypotension    Plan     Patient has been admitted to the TCU as a transfer from the hospital where he had a lengthy stay of more than 2 months.  Today has had a significant change in condition with more awake and alertness noted.  He was noted to be sitting up and trying to talk though he did not participate in any conversation.  Unfortunately he continues to have multiple falls when he rolls of bed so he has been transferred to a mattress on the floor.  Nursing is trying various positioning for a wheelchair but unfortunately even with a Broda chair not successful and he continues to slide downwards with involuntary movement noted.  Medication review done wondering if this is a side effect of Reglan will discontinue that.  Nursing to monitor him for any emesis concerns.  Staff will also let me know if his involuntary movement subsided  with discontinuation of Reglan  He was given this medication because of persistent emesis and difficulty tolerating tube feeding   so for that seems to be going well  He will eventually need placement in a group home  Staff advised to recheck weights because of significant discrepancy noted    History     Patient is a very pleasant 40 y.o. male who is admitted to TCU  Patient has been admitted after a very lengthy more than 60-day stay in the hospital.  He presented following PEA/VF arrest in the community.  He was admitted and noted to have anterior acute ST SUKI.  He had multiple episodes of cardiac arrest  He required ECMO support and was emergently taken for cardiac catheterization which showed a thrombotic occlusion of the LAD which was treated with aspiration thrombectomy and PCI with drug-eluting stent of the proximal LAD.  He has been discharged on medical management.  He does not give any evidence of chest pain but again history is difficult to obtain from him.  Blood pressures remain low frequently because of autonomic insufficiency    He also suffered from hypoxic brain injury.  Initial CT of the head was negative but EEG shows severe diffuse encephalopathy without seizure or epilepsy.  Repeat CT did show multifocal acute/subacute cerebral infarcts.  And brain injury which is consistent with hypoxic brain injury.  No improvement noted in the TCU he continues to be pretty nonresponsive and nonverbal.  No attempt of communication in any form has been successful he does not make any eye contact.  He has had some alertness now noted with more alertness noted however he still continues not to make any eye command and does not follow any commands either  He was noted to have aspiration pneumonitis which was treated.  Subsequently due to profound dysphagia he had PEG tube placement done and initiated on tube feeding.  Unfortunately had recurrent emesis after PEG tube placement.  A CT of his chest abdomen pelvis  was done which did not show any source for his emesis.  Zofran has been added for relief.  He is also on Protonix.  Tube feeding is going well  He has been reporting that his tube feeding has been going well.  He has not had any emesis.    He had persistent fever in the hospital with elevated white count.  He had extensive work-up done x-ray will did not show any findings CT was negative.  Repeat C. difficile was negative and UA negative he did undergo 7 days of Zosyn which she is currently done with.  He currently remains afebrile  Recheck CBC done in the TCU shows a white count of 8.3.    He has had multiple falls in the TCU.  Placement efforts have been unsuccessful and finally has been transferred to mattress on the floor.  Unfortunately remains hard to care for in light of his profound cognitive impairment.  He has become more alert but not responsive he remains alert and oriented to none.  No eye contact does not follow any commands.  Frequently will rule out of bed and now has been placed on the floor on the mattress because of multiple falls      Past Medical History     Active Ambulatory (Non-Hospital) Problems    Diagnosis   ? Hypoxic brain injury (H)   ? Dysphagia   ? Cardiac arrest (H)   ? Primary Lymphadenitis   ? Nicotine Dependence   ? Backache     Past Medical History:   Diagnosis Date   ? Acute respiratory failure with hypoxia (H)    ? LOWELL (acute kidney injury) (H)    ? Cardiac arrest (H)    ? Dysphagia    ? HTN (hypertension)    ? Hypoxic ischemic encephalopathy    ? NSTEMI (non-ST elevated myocardial infarction) (H)        Past Social History     Reviewed, and he  reports that he has been smoking. He has never used smokeless tobacco. He reports current alcohol use. He reports that he does not use drugs.    Family History     Reviewed, and family history includes Diabetes in his maternal aunt; Lung cancer in his maternal grandfather; Other in his father.    Medication List   Post Discharge  Medication Reconciliation Status: discharge medications reconciled, continue medications without change   Current Outpatient Medications on File Prior to Visit   Medication Sig Dispense Refill   ? acetaminophen (TYLENOL) 650 mg/20.3 mL Soln 650 mg every 6 (six) hours as needed. Via PEG-tube      ? amantadine HCL (SYMMETREL) 50 mg/5 mL solution 100 mg every 12 (twelve) hours. Via PEG-tube     ? aspirin 81 MG EC tablet 81 mg daily. Via PEG-tube     ? atorvastatin (LIPITOR) 40 MG tablet 40 mg at bedtime. Via PEG-tube     ? bromocriptine (PARLODEL) 2.5 mg tablet 2.5 mg 2 (two) times a day. Via PEG-tube     ? chlorhexidine (PERIDEX) 0.12 % solution Apply 15 mL to the mouth or throat 4 (four) times a day.     ? furosemide (LASIX) 10 mg/mL solution 10 mg every other day. Via PEG-tube, hold if SBP <90     ? levETIRAcetam (KEPPRA) 100 mg/mL solution 1,000 mg 2 (two) times a day. Via PEG-tube     ? lisinopriL (PRINIVIL,ZESTRIL) 2.5 MG tablet 1.25 mg daily. Via PEG-tube, hold if SBP <90     ? LORazepam (ATIVAN) 0.5 MG tablet 1 tablet (0.5 mg total) by G-tube route 4 (four) times a day. 120 tablet 0   ? metoclopramide (REGLAN) 5 MG tablet 5 mg 2 (two) times a day. Via PEG-tube     ? metoprolol tartrate (LOPRESSOR) 25 MG tablet 6.25 mg 2 (two) times a day. Via PEG-tube, hold if SBP <90, HR <60     ? neomycin-bacitracin-polymyxin B (NEOSPORIN) 3.5-400-5,000 mg-unit-unit OiPk ointment Apply 1 application topically 2 (two) times a day.     ? omeprazole (PRILOSEC) 2 mg/mL SusR suspension 40 mg daily before breakfast. Via PEG-tube     ? pantoprazole (PROTONIX) 40 MG tablet 40 mg daily. Via PEG-tube     ? potassium bicarb-citric acid 20 mEq TbEF 20 mEq daily. Via PEG-tube     ? QUEtiapine (SEROQUEL) 25 MG tablet 12.5 mg 3 (three) times a day. Via PEG-tube     ? ticagrelor (BRILINTA) 90 mg Tab 90 mg 2 (two) times a day. Via PEG-tube     ? traZODone (DESYREL) 50 MG tablet 50 mg at bedtime. Via PEG-tube     ? white petrolatum (AQUAPHOR  ORIGINAL) 41 % Oint Apply 1 application topically 2 (two) times a day. Apply to feet/legs       No current facility-administered medications on file prior to visit.        Allergies     No Known Allergies    Review of Systems   A comprehensive review of 14 systems was done. Pertinent findings noted here and in history of present illness. All the rest negative.  Constitutional: Negative.  Negative for fever, chills, he has activity change, appetite change and fatigue.   He has now been walking and taking a few steps.  He has been talking also  HENT: Negative for congestion and facial swelling.    Eyes: Negative for photophobia, redness and visual disturbance.   Respiratory: Negative for cough and chest tightness.    Cardiovascular: Negative for chest pain, palpitations and leg swelling.   Gastrointestinal: Negative for nausea, diarrhea, constipation, blood in stool and abdominal distention.   Genitourinary: Negative.    Musculoskeletal: Negative.  Hard to assess he does move his legs and arms spontaneously but not to command  Skin: Negative.    Neurological: Negative for dizziness, tremors, syncope, weakness, light-headedness and headaches.   Nonresponsive  Hematological: Does not bruise/bleed easily.   Psychiatric/Behavioral: Negative.  Unable to assess as patient does not cooperate  However today noted to be talking spontaneously  Staff reporting that he is not sleeping well.  He has significant anxiety and frequently rolls out of bed      Physical Exam     Recent Vitals 8/9/2020   Weight 219 lbs   /74   Pulse 98   Temp 98.3   Temp src -   SpO2 -   Some recent data might be hidden   Weight is 173 pounds blood pressure 93/61 temp 98 pulse 80    Constitutional:  appears well-developed.   HEENT:  Normocephalic and atraumatic.  Eyes: Conjunctivae and EOM are normal. Pupils are equal, round, and reactive to light. No discharge.  No scleral icterus. Nose normal. Mouth/Throat: Oropharynx is clear and moist. No  oropharyngeal exudate.    NECK: Normal range of motion. Neck supple. No JVD present. No tracheal deviation present. No thyromegaly present.   CARDIOVASCULAR: Normal rate, regular rhythm and intact distal pulses.  Exam reveals no gallop and no friction rub.  Systolic murmur present.  PULMONARY: Effort normal and breath sounds normal. No respiratory distress.No Wheezing or rales.  ABDOMEN: Soft. Bowel sounds are normal. No distension and no mass.  There is no tenderness. There is no rebound and no guarding. No HSM.  MUSCULOSKELETAL: Normal range of motion. No edema and no tenderness. Mild kyphosis, no tenderness.  Hard to assess as patient does not respond to verbal commands or follow them.  He seems to be spontaneously moving his arms and legs though  LYMPH NODES: Has no cervical, supraclavicular, axillary and groin adenopathy.   NEUROLOGICAL: Alert and oriented to NONE. No cranial nerve deficit.  Normal muscle tone. Coordination normal.   He does not make any eye contact.  Does not follow verbal commands either  GENITOURINARY: Deferred exam.  SKIN: Skin is warm and dry. No rash noted. No erythema. No pallor.   EXTREMITIES: No cyanosis, no clubbing, no edema. No Deformity.  PSYCHIATRIC: mood, affect and behavior is hard to assess because of noncommunication.      Lab Results     Hemoglobin 10.6.  White count was 11.5 with platelets of 336.  Electrolytes were normal.  Calcium 9.7 magnesium 2.2.  CR 0.76  4 days 119 AST 27 ALT 45  Recent Results (from the past 240 hour(s))   Basic Metabolic Panel   Result Value Ref Range    Sodium 138 136 - 145 mmol/L    Potassium 3.5 3.5 - 5.0 mmol/L    Chloride 104 98 - 107 mmol/L    CO2 22 22 - 31 mmol/L    Anion Gap, Calculation 12 5 - 18 mmol/L    Glucose 91 70 - 125 mg/dL    Calcium 9.7 8.5 - 10.5 mg/dL    BUN 13 8 - 22 mg/dL    Creatinine 0.82 0.70 - 1.30 mg/dL    GFR MDRD Af Amer >60 >60 mL/min/1.73m2    GFR MDRD Non Af Amer >60 >60 mL/min/1.73m2   HM2(CBC w/o Differential)    Result Value Ref Range    WBC 8.3 4.0 - 11.0 thou/uL    RBC 3.87 (L) 4.40 - 6.20 mill/uL    Hemoglobin 11.0 (L) 14.0 - 18.0 g/dL    Hematocrit 34.9 (L) 40.0 - 54.0 %    MCV 90 80 - 100 fL    MCH 28.4 27.0 - 34.0 pg    MCHC 31.5 (L) 32.0 - 36.0 g/dL    RDW 14.2 11.0 - 14.5 %    Platelets 296 140 - 440 thou/uL    MPV 11.5 8.5 - 12.5 fL   Magnesium   Result Value Ref Range    Magnesium 2.0 1.8 - 2.6 mg/dL       Imaging Results     Echocardiogram on 6/10/2020 shows LV ejection fraction of 36%.  LAD territory akinesis with right ventricular function chamber size wall movement and thickness are normal.    Coronary angiogram shows 100% stenosis of the proximal LAD lesion    CT of the head was negative    DARIO Vargas

## 2021-06-20 NOTE — LETTER
Letter by Fouzia High CNP at      Author: Fouzia High CNP Service: -- Author Type: --    Filed:  Encounter Date: 2020 Status: (Other)         Patient: Scot Bisohp   MR Number: [id number]   YOB: 1980   Date of Visit: 2020       [facility]      NAME:  Scot Bishop             :  1980    MRN: [id number]    CODE STATUS:      FACILITY: [facility] [[id number]]       CHIEF COMPLAIN/REASON FOR VISIT:  Chief Complaint   Patient presents with   ? Review Of Multiple Medical Conditions     rehab progress       HISTORY OF PRESENT ILLNESS: Scot Bishop is a 40 y.o. male being seen today for review of multiple medical conditions. Nursing concerned over pulse of 110. Rechecked apical and was at 98. He has no known medical past as per his EMR from [facility] prior to a NStemi in May 2020. At that juncture he was in cardiac cath lab and suffered a hypoxic brain injury, high temp and thrombotic event. He is seen in his room today. Non verbal and did not track with his eyes. He will make eye contact. He has repetitive movement while in bed.He has repetitive laughing today. He varies between crying and laughing per nursing.  Moving legs and arms. Per nursing he  Slid from bed over week end, no apparent injuries, they are placing a scoop mattress for preventive falls. . He will need ongoing rehab services with PT/OT/ST. Has Gtube de to his dysphagia.     No Known Allergies:     Current Outpatient Medications   Medication Sig   ? acetaminophen (TYLENOL) 650 mg/20.3 mL Soln 650 mg every 6 (six) hours as needed. Via PEG-tube    ? amantadine HCL (SYMMETREL) 50 mg/5 mL solution 100 mg every 12 (twelve) hours. Via PEG-tube   ? aspirin 81 MG EC tablet 81 mg daily. Via PEG-tube   ? atorvastatin (LIPITOR) 40 MG tablet 40 mg at bedtime. Via PEG-tube   ? bromocriptine (PARLODEL) 2.5 mg tablet 2.5 mg 2 (two) times a day. Via PEG-tube   ? chlorhexidine  (PERIDEX) 0.12 % solution Apply 15 mL to the mouth or throat 4 (four) times a day.   ? furosemide (LASIX) 10 mg/mL solution 10 mg every other day. Via PEG-tube, hold if SBP <90   ? levETIRAcetam (KEPPRA) 100 mg/mL solution 1,000 mg 2 (two) times a day. Via PEG-tube   ? lisinopriL (PRINIVIL,ZESTRIL) 2.5 MG tablet 1.25 mg daily. Via PEG-tube, hold if SBP <90   ? LORazepam (ATIVAN) 0.5 MG tablet 1 tablet (0.5 mg total) by G-tube route 4 (four) times a day.   ? metoclopramide (REGLAN) 5 MG tablet 5 mg 2 (two) times a day. Via PEG-tube   ? metoprolol tartrate (LOPRESSOR) 25 MG tablet 6.25 mg 2 (two) times a day. Via PEG-tube, hold if SBP <90, HR <60   ? neomycin-bacitracin-polymyxin B (NEOSPORIN) 3.5-400-5,000 mg-unit-unit OiPk ointment Apply 1 application topically 2 (two) times a day.   ? omeprazole (PRILOSEC) 2 mg/mL SusR suspension 40 mg daily before breakfast. Via PEG-tube   ? pantoprazole (PROTONIX) 40 MG tablet 40 mg daily. Via PEG-tube   ? potassium bicarb-citric acid 20 mEq TbEF 20 mEq daily. Via PEG-tube   ? QUEtiapine (SEROQUEL) 25 MG tablet 12.5 mg 3 (three) times a day. Via PEG-tube   ? ticagrelor (BRILINTA) 90 mg Tab 90 mg 2 (two) times a day. Via PEG-tube   ? traZODone (DESYREL) 50 MG tablet 50 mg at bedtime. Via PEG-tube   ? white petrolatum (AQUAPHOR ORIGINAL) 41 % Oint Apply 1 application topically 2 (two) times a day. Apply to feet/legs         REVIEW OF SYSTEMS:  Unable to verbalize due to aphasia    PHYSICAL EXAMINATION:  Vitals:    08/09/20 0804   BP: 112/74   Pulse: 98   Temp: 98.3  F (36.8  C)   Weight: 219 lb (99.3 kg)         GENERAL: Does not follow simple commands, makes eye contact, non verbal.  HEENT: Head is normocephalic with normal hair distribution. No evidence of trauma. Ears: No acute purulent discharge. Eyes: Conjunctivae pink with no scleral jaundice. Nose: Normal mucosa and septum. NECK: Supple with no cervical or supraclavicular lymphadenopathy. Trachea is midline, healing trach  stoma  CHEST: No tenderness or deformity, no crepitus  LUNG: Clear to auscultation with good chest expansion. There are no crackles or wheezes, normal AP diameter.    CVS: There is good S1  S2, rhythm is regular.Pulse is at 98\  ABDOMEN: Globular and soft, nontender to palpation, non distended, no masses, no organomegaly, good bowel sounds, no rebound or guarding, no peritoneal signs. Gtube in place, wears an abdominal binder to prevent pulling  EXTREMITIES: Atraumatic. Full range of motion on both upper and lower extremities, there is no tenderness to palpation, no pedal edema, no cyanosis or clubbing, no calf tenderness, normal cap refill, no joint swelling.  SKIN: Warm and dry, no erythema noted, no rashes or lesions.  NEUROLOGICAL: The patient is oriented to person, place and time. Strength and sensation are grossly intact. Face is symmetric.          LABS:    Lab Results   Component Value Date    WBC 8.3 08/06/2020    HGB 11.0 (L) 08/06/2020    HCT 34.9 (L) 08/06/2020    MCV 90 08/06/2020     08/06/2020       Results for orders placed or performed in visit on 08/06/20   Basic Metabolic Panel   Result Value Ref Range    Sodium 138 136 - 145 mmol/L    Potassium 3.5 3.5 - 5.0 mmol/L    Chloride 104 98 - 107 mmol/L    CO2 22 22 - 31 mmol/L    Anion Gap, Calculation 12 5 - 18 mmol/L    Glucose 91 70 - 125 mg/dL    Calcium 9.7 8.5 - 10.5 mg/dL    BUN 13 8 - 22 mg/dL    Creatinine 0.82 0.70 - 1.30 mg/dL    GFR MDRD Af Amer >60 >60 mL/min/1.73m2    GFR MDRD Non Af Amer >60 >60 mL/min/1.73m2           No results found for: HGBA1C  No results found for: [id number]  No results found for: [id number]    ASSESSMENT/PLAN:  1. Hypoxic brain injury (H)    2. Dysphagia, unspecified type      1. Hypoxic brain injury: Will be in TCU for rehabilitation, PT/OT/ST  And dietary to follow SW for dc planning , she has been in contact with blended family and they need support as they are not understanding severity of pts brain  injury.. I also spoke to NM and suggest she and SW talk to mother, who wasn't involved in care conference as father and step mother were, to discuss prognosis. I am happy to attend if clinical support needed.   SN to manage meds via tube and chronic medical conditons. Fall risk , a scoop mattress will be put in place.     2 . Dysphagia: All nutrition via Gtube, all meds thru Gtube.  ST to work with pt.SN for GTube management and chronic medical conditions.      Electronically signed by:  Fouzia High CNP  This progress note was completed using Dragon software and there may be grammatical errors.

## 2021-06-20 NOTE — LETTER
Letter by Fouzia High CNP at      Author: Fouzia Hgih CNP Service: -- Author Type: --    Filed:  Encounter Date: 2020 Status: (Other)         Patient: Scot Bishop   MR Number: [id number]   YOB: 1980   Date of Visit: 2020       [facility]      NAME:  Scot Bishop             :  1980    MRN: [id number]    CODE STATUS:      FACILITY: [facility] [[id number]]       CHIEF COMPLAIN/REASON FOR VISIT:  Chief Complaint   Patient presents with   ? Problem Visit     agitation       HISTORY OF PRESENT ILLNESS: Scot Bishop is a 40 y.o. male being seen for per nursing as he has recently been agitated. More awake and restless. Diversional activities limited due to brain injury.  [facility] in May 2020. At that juncture he was in cardiac cath lab and suffered a hypoxic brain injury, high temp and thrombotic event. He is seen in his room today. Non verbal and did not track with his eyes. He will make eye contact. He has repetitive movement while in bed.He has repetitive laughing today. He varies between crying and laughing per nursing.  Moving legs and arms. Per nursing he  Slid from bed over week end, no apparent injuries, they are placing a scoop mattress for preventive falls. . He will need ongoing rehab services with PT/OT/ST. Has Gtube due to his dysphagia, which reviewed that weight has been stable.. Unfortunealy due to Scot Hypoxic brain injury he will not be able to understand hygiene education and nursing will need to meet these needs for pt. Therapy continues to work with him, seen ambualting with SBA two.Staff to anticipate needs and provide good elizabeth care two times a day and prn.   continues to work with family towards DC to a group home after TCU stay as patient will be unable to care for self, remains dependant on staff for all ADL and mobility as well as GTube for feedings..  Pt does continue with rehab services    No Known Allergies:     Current Outpatient Medications   Medication Sig   ? acetaminophen (TYLENOL) 650 mg/20.3 mL Soln 650 mg every 6 (six) hours as needed. Via PEG-tube    ? amantadine HCL (SYMMETREL) 50 mg/5 mL solution 100 mg every 12 (twelve) hours. Via PEG-tube   ? aspirin 81 MG EC tablet 81 mg daily. Via PEG-tube   ? atorvastatin (LIPITOR) 40 MG tablet 40 mg at bedtime. Via PEG-tube   ? bromocriptine (PARLODEL) 2.5 mg tablet 2.5 mg 2 (two) times a day. Via PEG-tube   ? chlorhexidine (PERIDEX) 0.12 % solution Apply 15 mL to the mouth or throat 4 (four) times a day.   ? levETIRAcetam (KEPPRA) 100 mg/mL solution 1,000 mg 2 (two) times a day. Via PEG-tube   ? lisinopriL (PRINIVIL,ZESTRIL) 2.5 MG tablet 1.25 mg daily. Via PEG-tube, hold if SBP <90   ? LORazepam (ATIVAN) 0.5 MG tablet 1 tablet (0.5 mg total) by G-tube route 4 (four) times a day.   ? metoclopramide (REGLAN) 5 MG tablet 5 mg 2 (two) times a day. Via PEG-tube   ? metoprolol tartrate (LOPRESSOR) 25 MG tablet 6.25 mg 2 (two) times a day. Via PEG-tube, hold if SBP <90, HR <60   ? neomycin-bacitracin-polymyxin B (NEOSPORIN) 3.5-400-5,000 mg-unit-unit OiPk ointment Apply 1 application topically 2 (two) times a day.   ? omeprazole (PRILOSEC) 2 mg/mL SusR suspension 40 mg daily before breakfast. Via PEG-tube   ? pantoprazole (PROTONIX) 40 MG tablet 40 mg daily. Via PEG-tube   ? potassium bicarb-citric acid 20 mEq TbEF 20 mEq daily. Via PEG-tube   ? QUEtiapine (SEROQUEL) 25 MG tablet 12.5 mg 3 (three) times a day. Via PEG-tube   ? ticagrelor (BRILINTA) 90 mg Tab 90 mg 2 (two) times a day. Via PEG-tube   ? traZODone (DESYREL) 50 MG tablet 50 mg at bedtime. Via PEG-tube   ? white petrolatum (AQUAPHOR ORIGINAL) 41 % Oint Apply 1 application topically 2 (two) times a day. Apply to feet/legs         REVIEW OF SYSTEMS:  Unable to verbalize due to aphasia    PHYSICAL EXAMINATION:  Vitals:    09/26/20 0508   BP: 99/62   Pulse:  (!) 108   Temp: (!) 95.6  F (35.3  C)   Weight: 169 lb (76.7 kg)         GENERAL: Does not follow simple commands, makes eye contact, non verbal.  HEENT: Head is normocephalic with normal hair distribution. No evidence of trauma. Ears: No acute purulent discharge. Eyes: Conjunctivae pink with no scleral jaundice. Nose: Normal mucosa and septum. NECK: Supple with no cervical or supraclavicular lymphadenopathy. Trachea is midline, healing trach stoma  EXTREMITIES: Atraumatic. Full range of motion on both upper and lower extremities, there is no tenderness to palpation, no pedal edema, no cyanosis or clubbing, no calf tenderness, normal cap refill, no joint swelling.  SKIN: Warm and dry, no erythema noted, abdomen with g tube  NEUROLOGICAL: The patient is oriented to person, TBI      LABS:    Lab Results   Component Value Date    WBC 8.3 08/06/2020    HGB 11.0 (L) 08/06/2020    HCT 34.9 (L) 08/06/2020    MCV 90 08/06/2020     08/06/2020       Results for orders placed or performed in visit on 09/17/20   Basic Metabolic Panel   Result Value Ref Range    Sodium 141 136 - 145 mmol/L    Potassium 4.5 3.5 - 5.0 mmol/L    Chloride 105 98 - 107 mmol/L    CO2 28 22 - 31 mmol/L    Anion Gap, Calculation 8 5 - 18 mmol/L    Glucose 91 70 - 125 mg/dL    Calcium 9.8 8.5 - 10.5 mg/dL    BUN 11 8 - 22 mg/dL    Creatinine 0.78 0.70 - 1.30 mg/dL    GFR MDRD Af Amer >60 >60 mL/min/1.73m2    GFR MDRD Non Af Amer >60 >60 mL/min/1.73m2           No results found for: HGBA1C  No results found for: [id number]  No results found for: [id number]    ASSESSMENT/PLAN:  1. Agitation    2. Hypoxic brain damage (H)      1/ Agitation: Much improved, on scheduled ativan, recent increase noted. Staff will take Scot for walks and offer rest periods for diversion. See problem #2, due to brain injury very low function and needs constant supervision and support.      2..Hypoxic brain injury:   We currently continues with rehab at this juncture  wheelchair-bound he will fall risk due to restlessness in bed and has a scoop mattress. Nursing staff does walk him with gait belt and guidance. His ambulating has an odd goose step when walking.  Met with our therapist today, and we discussed that murali Phillips, rehab center in [place] did get back to them and have asked her all of September notes to review Scot could be a candidate for their TBI area, no bed available at this time. Facility will work on ERT and look at LTC placement for now.     Electronically signed by:  Fouzia High CNP  This progress note was completed using Dragon software and there may be grammatical errors.

## 2021-06-20 NOTE — LETTER
Letter by Fouzia High CNP at      Author: Fouzia High CNP Service: -- Author Type: --    Filed:  Encounter Date: 2020 Status: (Other)         Patient: Scot Bishop   MR Number: [id number]   YOB: 1980   Date of Visit: 2020       [facility]      NAME:  Scot Bishop             :  1980    MRN: [id number]    CODE STATUS:      FACILITY: [facility] [[id number]]       CHIEF COMPLAIN/REASON FOR VISIT:  Chief Complaint   Patient presents with   ? Review Of Multiple Medical Conditions     rehab progress       HISTORY OF PRESENT ILLNESS: Scot Bishop is a 40 y.o. male being seen per nursing request for abdominal skin impairment and yeast like rash to buttocks. . Started on abx this week for impetigo.  He has no known medical past as per his EMR from [facility] prior to a NStemi in May 2020. At that juncture he was in cardiac cath lab and suffered a hypoxic brain injury, high temp and thrombotic event. He is seen in his room today. Non verbal and did not track with his eyes. He will make eye contact. He has repetitive movement while in bed.He has repetitive laughing today. He varies between crying and laughing per nursing.  Moving legs and arms. Per nursing he  Slid from bed over week end, no apparent injuries, they are placing a scoop mattress for preventive falls. . He will need ongoing rehab services with PT/OT/ST. Has Gtube de to his dysphagia. Unfortunealy due to Scot Hypoxic brain injury he will not be able to understand hygiene education and nursing will need to meet these needs for pt. Therapy continues to work with him, seen ambualting with SBA two.    No Known Allergies:     Current Outpatient Medications   Medication Sig   ? acetaminophen (TYLENOL) 650 mg/20.3 mL Soln 650 mg every 6 (six) hours as needed. Via PEG-tube    ? amantadine HCL (SYMMETREL) 50 mg/5 mL solution 100 mg every 12 (twelve) hours. Via PEG-tube   ? aspirin  81 MG EC tablet 81 mg daily. Via PEG-tube   ? atorvastatin (LIPITOR) 40 MG tablet 40 mg at bedtime. Via PEG-tube   ? bromocriptine (PARLODEL) 2.5 mg tablet 2.5 mg 2 (two) times a day. Via PEG-tube   ? chlorhexidine (PERIDEX) 0.12 % solution Apply 15 mL to the mouth or throat 4 (four) times a day.   ? furosemide (LASIX) 10 mg/mL solution 10 mg every other day. Via PEG-tube, hold if SBP <90   ? levETIRAcetam (KEPPRA) 100 mg/mL solution 1,000 mg 2 (two) times a day. Via PEG-tube   ? lisinopriL (PRINIVIL,ZESTRIL) 2.5 MG tablet 1.25 mg daily. Via PEG-tube, hold if SBP <90   ? LORazepam (ATIVAN) 0.5 MG tablet 1 tablet (0.5 mg total) by G-tube route 4 (four) times a day.   ? metoclopramide (REGLAN) 5 MG tablet 5 mg 2 (two) times a day. Via PEG-tube   ? metoprolol tartrate (LOPRESSOR) 25 MG tablet 6.25 mg 2 (two) times a day. Via PEG-tube, hold if SBP <90, HR <60   ? neomycin-bacitracin-polymyxin B (NEOSPORIN) 3.5-400-5,000 mg-unit-unit OiPk ointment Apply 1 application topically 2 (two) times a day.   ? omeprazole (PRILOSEC) 2 mg/mL SusR suspension 40 mg daily before breakfast. Via PEG-tube   ? pantoprazole (PROTONIX) 40 MG tablet 40 mg daily. Via PEG-tube   ? potassium bicarb-citric acid 20 mEq TbEF 20 mEq daily. Via PEG-tube   ? QUEtiapine (SEROQUEL) 25 MG tablet 12.5 mg 3 (three) times a day. Via PEG-tube   ? ticagrelor (BRILINTA) 90 mg Tab 90 mg 2 (two) times a day. Via PEG-tube   ? traZODone (DESYREL) 50 MG tablet 50 mg at bedtime. Via PEG-tube   ? white petrolatum (AQUAPHOR ORIGINAL) 41 % Oint Apply 1 application topically 2 (two) times a day. Apply to feet/legs         REVIEW OF SYSTEMS:  Unable to verbalize due to aphasia    PHYSICAL EXAMINATION:  Vitals:    08/31/20 1719   BP: 108/64   Pulse: 99   Temp: (!) 96  F (35.6  C)   Weight: 174 lb (78.9 kg)         GENERAL: Does not follow simple commands, makes eye contact, non verbal.  HEENT: Head is normocephalic with normal hair distribution. No evidence of trauma.  Ears: No acute purulent discharge. Eyes: Conjunctivae pink with no scleral jaundice. Nose: Normal mucosa and septum. NECK: Supple with no cervical or supraclavicular lymphadenopathy. Trachea is midline, healing trach stoma  EXTREMITIES: Atraumatic. Full range of motion on both upper and lower extremities, there is no tenderness to palpation, no pedal edema, no cyanosis or clubbing, no calf tenderness, normal cap refill, no joint swelling.  SKIN: Warm and dry, no erythema noted, abdomen with raw scratched irritated areas and pus like dried scabs, less erythema, improving.  NEUROLOGICAL: The patient is oriented to person, TBI      LABS:    Lab Results   Component Value Date    WBC 8.3 08/06/2020    HGB 11.0 (L) 08/06/2020    HCT 34.9 (L) 08/06/2020    MCV 90 08/06/2020     08/06/2020       Results for orders placed or performed in visit on 08/18/20   Basic Metabolic Panel   Result Value Ref Range    Sodium 141 136 - 145 mmol/L    Potassium 4.0 3.5 - 5.0 mmol/L    Chloride 104 98 - 107 mmol/L    CO2 25 22 - 31 mmol/L    Anion Gap, Calculation 12 5 - 18 mmol/L    Glucose 90 70 - 125 mg/dL    Calcium 9.5 8.5 - 10.5 mg/dL    BUN 12 8 - 22 mg/dL    Creatinine 0.81 0.70 - 1.30 mg/dL    GFR MDRD Af Amer >60 >60 mL/min/1.73m2    GFR MDRD Non Af Amer >60 >60 mL/min/1.73m2           No results found for: HGBA1C  No results found for: [id number]  No results found for: CTRFNVXD33    ASSESSMENT/PLAN:  1. Hypoxic brain injury (H)    2. Impetigo any site      1.Hypoxic brain injury: Continues to work with therapies, looking at increased therapy dc to [facility].    2 . Impetigo: Current treatment of augmentin per GT and bacatracin topical has demonstrated effective and skin impairment improving. Encourage binder on to protect skin. We will place a stop date on his Augmentin today but please continue with bacitracin to open areas of skin on abdomin.    3. Buttocks: Add nystatin ointment on buttocks along with barrier  cream two times a day.     Electronically signed by:  Fouzia High CNP  This progress note was completed using Dragon software and there may be grammatical errors.

## 2021-06-20 NOTE — LETTER
Letter by Cortney Bahena MBBS at      Author: Cortney Bahena MBBS Service: -- Author Type: --    Filed:  Encounter Date: 10/1/2020 Status: (Other)         Patient: Scot Bishop   MR Number: [id number]   YOB: 1980   Date of Visit: 10/1/2020       [facility]  note      Patient: Scot Bishop  MRN: [id number]  Date of Service: 10/1/2020      [facility] [[id number]]  Reason for Visit     Chief Complaint   Patient presents with   ? Problem Visit   Follow-up on fever /low bp/agitation    Code Status     FULL CODE    Assessment     -Increasing agitation due to which patient is refusing cares  -Persistent hypotension low blood pressures noted-  -Low-grade temperatures of unclear etiology  -Acute VF arrest secondary to acute ST SUKI  -Acute LAD occlusion with underlying history of coronary artery disease  -Hypoxic brain injury  - Severe dysphagia currently discharged on tube feeding; now with staff reporting patient constantly pulling tube feeding out  -Aspiration pneumonia currently resolved  -Acute hypoxic respiratory failure currently resolved  -History of lengthy stay in the hospital with multiple procedures including requiring intubation and ECMO support from 5 17-5 19 with tracheostomy and PEG placement.  -LOWELL currently resolved      Plan     Patient has been admitted to the TCU as a transfer from the hospital where he had a lengthy stay of more than 2 months.  He is on medical management.  He was seen today at the request of nursing for requesting some medication for behavioral management.  Today they could not get any blood pressures on him.  He was scheduled for a bath and would not let staff take care of his needs due to increasing behavioral dysregulation with agitation.  He is on scheduled Seroquel.  He has been given a weighted blanket with some improvement but unfortunately nursing believes that without additional meds his cares has been compromised.  There is also  some concern about hygiene.  He is on bromocriptine twice daily which is supposed to help with outcomes after brain injury but so far not much improvement noted  Similarly continues on amantadine for the same reason.  Speech therapy has been working with him.  He is also on psychotropic meds including trazodone and schedule Seroquel 3 times daily.  Plan is to add Seroquel 25 mg at 9 AM.  Hoping that this will help nursing to his at their cares including any hygiene cares as well as vital checks and other issues during this time without the patient resisting.  They will let me know.  Also requesting some alternative management including using weighted blanket as well as a stuffed animal which I think could help calm down his anxiety/agitation.  Family is looking at long-term placement for him      History     Patient is a very pleasant 40 y.o. male who is admitted to TCU  Patient has been admitted after a very lengthy more than 60-day stay in the hospital.  He presented following PEA/VF arrest in the community.  He was admitted and noted to have anterior acute ST SUKI.  He had multiple episodes of cardiac arrest  He required ECMO support and was emergently taken for cardiac catheterization which showed a thrombotic occlusion of the LAD which was treated with aspiration thrombectomy and PCI with drug-eluting stent of the proximal LAD.  Currently he is on medical management.  Weights have been stable.    He also suffered from hypoxic brain injury.  Initial CT of the head was negative but EEG shows severe diffuse encephalopathy without seizure or epilepsy.  Repeat CT did show multifocal acute/subacute cerebral infarcts.  And brain injury which is consistent with hypoxic brain injury.  No improvement noted.  Unfortunately he is having increasing behavioral dysregulation and noted to be very difficult to care for recently had a discussion with the nurse manager and he has been given a sensory blanket to help with his  agitation.  Today he would not let staff check his blood pressures.  Today was his birthday and staff was unsuccessful.  They are wondering if they will be able to get to his cares because of his agitation.  Redirection does not easily work for him either    He remains on tube feeding.  Not much improvement noted he had a swallow study done recently will await the recommendations to see if he is ready for any oral intake    Blood pressures continue to be labile with lows and highs noted he is on a much lower dose of medications now.  Unfortunately is not able to tolerate them because of autonomic insufficiency resulting in labile blood pressures          Past Medical History     Active Ambulatory (Non-Hospital) Problems    Diagnosis   ? Agitation   ? Low BP   ? G tube feedings (H)   ? Yeast infection   ? Impetigo any site   ? Hypoxic brain damage (H)   ? Acute respiratory failure with hypoxia (H)   ? Gastrojejunostomy tube status (H)   ? Hypoxic brain injury (H)   ? Dysphagia   ? Cardiac arrest (H)   ? Primary Lymphadenitis   ? Nicotine Dependence   ? Backache     Past Medical History:   Diagnosis Date   ? Acute respiratory failure with hypoxia (H)    ? LOWELL (acute kidney injury) (H)    ? Cardiac arrest (H)    ? Dysphagia    ? HTN (hypertension)    ? Hypoxic ischemic encephalopathy    ? NSTEMI (non-ST elevated myocardial infarction) (H)        Past Social History     Reviewed, and he  reports that he has been smoking. He has never used smokeless tobacco. He reports current alcohol use. He reports that he does not use drugs.    Family History     Reviewed, and family history includes Diabetes in his maternal aunt; Lung cancer in his maternal grandfather; Other in his father.    Medication List   Post Discharge Medication Reconciliation Status: discharge medications reconciled, continue medications without change   Current Outpatient Medications on File Prior to Visit   Medication Sig Dispense Refill   ? acetaminophen  (TYLENOL) 650 mg/20.3 mL Soln 650 mg every 6 (six) hours as needed. Via PEG-tube      ? amantadine HCL (SYMMETREL) 50 mg/5 mL solution 100 mg every 12 (twelve) hours. Via PEG-tube     ? aspirin 81 MG EC tablet 81 mg daily. Via PEG-tube     ? atorvastatin (LIPITOR) 40 MG tablet 40 mg at bedtime. Via PEG-tube     ? bromocriptine (PARLODEL) 2.5 mg tablet 2.5 mg 2 (two) times a day. Via PEG-tube     ? chlorhexidine (PERIDEX) 0.12 % solution Apply 15 mL to the mouth or throat 4 (four) times a day.     ? levETIRAcetam (KEPPRA) 100 mg/mL solution 1,000 mg 2 (two) times a day. Via PEG-tube     ? lisinopriL (PRINIVIL,ZESTRIL) 2.5 MG tablet 1.25 mg daily. Via PEG-tube, hold if SBP <90     ? LORazepam (ATIVAN) 0.5 MG tablet 1 tablet (0.5 mg total) by G-tube route 4 (four) times a day. 120 tablet 0   ? metoclopramide (REGLAN) 5 MG tablet 5 mg 2 (two) times a day. Via PEG-tube     ? metoprolol tartrate (LOPRESSOR) 25 MG tablet 6.25 mg 2 (two) times a day. Via PEG-tube, hold if SBP <90, HR <60     ? neomycin-bacitracin-polymyxin B (NEOSPORIN) 3.5-400-5,000 mg-unit-unit OiPk ointment Apply 1 application topically 2 (two) times a day.     ? omeprazole (PRILOSEC) 2 mg/mL SusR suspension 40 mg daily before breakfast. Via PEG-tube     ? pantoprazole (PROTONIX) 40 MG tablet 40 mg daily. Via PEG-tube     ? potassium bicarb-citric acid 20 mEq TbEF 20 mEq daily. Via PEG-tube     ? QUEtiapine (SEROQUEL) 25 MG tablet 12.5 mg 3 (three) times a day. Via PEG-tube     ? ticagrelor (BRILINTA) 90 mg Tab 90 mg 2 (two) times a day. Via PEG-tube     ? traZODone (DESYREL) 50 MG tablet 50 mg at bedtime. Via PEG-tube     ? white petrolatum (AQUAPHOR ORIGINAL) 41 % Oint Apply 1 application topically 2 (two) times a day. Apply to feet/legs       No current facility-administered medications on file prior to visit.        Allergies     No Known Allergies    Review of Systems   A comprehensive review of 14 systems was done. Pertinent findings noted here and  in history of present illness. All the rest negative.  Constitutional: Negative.  Negative for fever, chills, he has activity change, appetite change and fatigue.   He has now been walking and taking a few steps.  He has been talking also  HENT: Negative for congestion and facial swelling.    Eyes: Negative for photophobia, redness and visual disturbance.   Respiratory: Negative for cough and chest tightness.    Cardiovascular: Negative for chest pain, palpitations and leg swelling.   Gastrointestinal: Negative for nausea, diarrhea, constipation, blood in stool and abdominal distention.   Genitourinary: Negative.    Musculoskeletal: Negative.  Hard to assess he does move his legs and arms spontaneously but not to command  Skin: Negative.    Neurological: Negative for dizziness, tremors, syncope, weakness, light-headedness and headaches.   Constant scissoring like movement of his legs and arms noted.  Nonresponsive  Hematological: Does not bruise/bleed easily.   Psychiatric/Behavioral: Negative.  Unable to assess as patient does not cooperate  However today noted to be talking spontaneously  Staff reporting that he is not sleeping well.  He has significant anxiety and frequently rolls out of bed      Physical Exam     Recent Vitals 9/27/2020   Weight 167 lbs   BP 98/64   Pulse 80   Temp 98.2   Some recent data might be hidden   Weight is 168 pounds blood pressure 80/46 temp 99  pulse 105    Constitutional:  appears well-developed.   HEENT:  Normocephalic and atraumatic.  Eyes: Conjunctivae and EOM are normal. Pupils are equal, round, and reactive to light. No discharge.  No scleral icterus. Nose normal. Mouth/Throat: Oropharynx is clear and moist. No oropharyngeal exudate.    NECK: Normal range of motion. Neck supple. No JVD present. No tracheal deviation present. No thyromegaly present.   CARDIOVASCULAR: Normal rate, regular rhythm and intact distal pulses.  Exam reveals no gallop and no friction rub.  Systolic  murmur present.  PULMONARY: Effort normal and breath sounds normal. No respiratory distress.No Wheezing or rales.  ABDOMEN: Soft. Bowel sounds are normal. No distension and no mass.  There is no tenderness. There is no rebound and no guarding. No HSM.  Does have a G-tube   MUSCULOSKELETAL: Normal range of motion. No edema and no tenderness. Mild kyphosis, no tenderness.  Hard to assess as patient does not respond to verbal commands or follow them.  He seems to be spontaneously moving his arms and legs though  LYMPH NODES: Has no cervical, supraclavicular, axillary and groin adenopathy.   NEUROLOGICAL: Alert and oriented to NONE. No cranial nerve deficit.  ABNormal muscle tone. Coordination normal.   Continues to have constant jerking scissoring movement of his arms and legs  He does not make any eye contact.  Does not follow verbal commands either  GENITOURINARY: Deferred exam.  SKIN: Skin is warm and dry. No rash noted. No erythema. No pallor.   Excoriation of the skin in the abdomen noted  EXTREMITIES: No cyanosis, no clubbing, no edema. No Deformity.  PSYCHIATRIC: mood, affect and behavior is hard to assess because of noncommunication.      Lab Results     Results for orders placed or performed in visit on 09/17/20   Basic Metabolic Panel   Result Value Ref Range    Sodium 141 136 - 145 mmol/L    Potassium 4.5 3.5 - 5.0 mmol/L    Chloride 105 98 - 107 mmol/L    CO2 28 22 - 31 mmol/L    Anion Gap, Calculation 8 5 - 18 mmol/L    Glucose 91 70 - 125 mg/dL    Calcium 9.8 8.5 - 10.5 mg/dL    BUN 11 8 - 22 mg/dL    Creatinine 0.78 0.70 - 1.30 mg/dL    GFR MDRD Af Amer >60 >60 mL/min/1.73m2    GFR MDRD Non Af Amer >60 >60 mL/min/1.73m2             DARIO Vargas

## 2021-06-21 ENCOUNTER — HOSPITAL ENCOUNTER (OUTPATIENT)
Dept: NUTRITION | Facility: CLINIC | Age: 41
Discharge: HOME | End: 2021-06-21
Attending: DIETITIAN, REGISTERED | Admitting: DIETITIAN, REGISTERED
Payer: COMMERCIAL

## 2021-06-21 PROCEDURE — 97802 MEDICAL NUTRITION INDIV IN: CPT | Mod: GT | Performed by: DIETITIAN, REGISTERED

## 2021-06-21 NOTE — LETTER
Letter by Cortney Bahena MBBS at      Author: Cortney Bahena MBBS Service: -- Author Type: --    Filed:  Encounter Date: 1/12/2021 Status: (Other)         Patient: Scot Bishop   MR Number: [id number]   YOB: 1980   Date of Visit: 1/12/2021       [facility] Admission note      Patient: Scot Bishop  MRN: [id number]        [facility] [[id number]]  Reason for Visit     Chief Complaint   Patient presents with   ? Review Of Multiple Medical Conditions   Evaluated for review of his psychotropic medications1    Code Status     dnr /comfort foccused    Assessment     - s/p anoxic brain injury   - Dysphagia on TF  -- long hospitalization with agitated /aggressive behavoir  Patient on multiple psychotropic meds.  -Hypotension.  - FTT    Plan     Patient has been readmitted to the TCU  He remains bed/WC bound  Mood and behaviors were reviewed again and they are concerned because there have seen some return of his previous behaviors including pulling at his G-tube trying to remove it and somewhat aggressive behaviors.  He was noted to have repetitive movement of his arm and is hard to redirect.  Nursing believes that he is trying to scratch himself.  Unfortunately he is not showing any return in cognitive activity.  Staff did report that they did notice that he was trying to talk this morning.  He still does not make any eye contact.  Vitals were reviewed at the request of staff he frequently will have labile blood pressures with low blood pressures and occasional episodes of low-grade temperature.  These are felt to be due to autonomic instability he does not have any localizing symptoms so we will continue to monitor him.  No taper of psych meds at present  Last BMP was normal    History     Patient is a very pleasant 40 y.o. male who is readmitted to TCU  Pt  Is admitted to behavioral health unit due to aggressive behaviors; with hitting staff and difficulty to care  Various  interventions were tried and were unsuccessful and eventually patient was started on psychotropic meds.  Eventually his behaviors escalated and he had been sent to the behavioral health unit he comes back on high doses of medication including Seroquel trazodone.  Recently Depakote was reduced to 3 times a day with improvement noted.  Today was sitting in a Broda chair.  Since then he has become more alert.  He was noted to be talking to staff but his interaction at baseline remains poor.  He does not make any eye contact.  He was noted to be scratching and picking at his skin from which he is hard to redirect he has been given various sensory tools to keep him engaged.  At baseline nonambulatory and requires a Broda chair he is an assist of all cares.  In addition he has chronically low blood pressures which are felt to be secondary to his autonomic instability.  He continues to require tube feeding.  At present he is not able to self initiate eating.  He also has low blood pressures with intermittent low-grade temperatures noted work-up in the past has been negative including extensive imaging.  It is felt secondary to autonomic instability    Past Medical History     Active Ambulatory (Non-Hospital) Problems    Diagnosis   ? Sedated   ? Thrush, oral   ? Fever   ? Aggression   ? Agitation   ? Low BP   ? G tube feedings (H)   ? Yeast infection   ? Impetigo any site   ? Hypoxic brain damage (H)   ? Acute respiratory failure with hypoxia (H)   ? Gastrojejunostomy tube status (H)   ? Hypoxic brain injury (H)   ? Dysphagia   ? Cardiac arrest (H)   ? Primary Lymphadenitis   ? Nicotine Dependence   ? Backache     Past Medical History:   Diagnosis Date   ? Acute respiratory failure with hypoxia (H)    ? Aggression 11/09/2020   ? Agitation 09/21/2020   ? LOWELL (acute kidney injury) (H)    ? Back injury, sequela 10/27/2017   ? Cardiac arrest (H)    ? Cardiac arrest (H) 05/17/2020   ? Cognitive disorder 11/11/2020   ? Dysphagia     ? Dysphagia 11/11/2020   ? Gastrojejunostomy tube status (H) 11/11/2020   ? HTN (hypertension)    ? Hypoxic ischemic encephalopathy    ? Low blood pressure 09/17/2020   ? Nonverbal 11/11/2020   ? NSTEMI (non-ST elevated myocardial infarction) (H)    ? TBI (traumatic brain injury) (H) 06/25/2020       Past Social History     Reviewed, and he  reports that he has been smoking. He has been smoking about 1.00 pack per day. He has never used smokeless tobacco. He reports current alcohol use. He reports that he does not use drugs.    Family History     Reviewed, and family history includes Cancer in his maternal grandfather; Diabetes in his maternal aunt; Diabetes type I in his maternal aunt; Lung cancer in his maternal grandfather; Other in his father.    Medication List   Post Discharge Medication Reconciliation Status:    QUEtiapine (SEROQUEL) 50 mg tablet    Indications: Aggression Administer 1 tablet via G-tube 3 times daily.   0 11/27/2020     haloperidoL (HALDOL) 5 mg tablet    Indications: Aggression Administer 1 tablet via G-tube 3 times daily.   0 11/27/2020     divalproex sprinkles (DEPAKOTE SPRINKLES) 125 mg capsule    Indications: Aggression 4 capsules 2 times daily. G tube   0 11/27/2020     traZODone (DESYREL) 50 mg tablet    Indications: Aggression Administer 1 tablet via G-tube at bedtime.   0 11/27/2020     potassium chloride (K-APOLLO) 20 mEq packet    Indications: Dehydration 1 Packet once daily with a meal. G-tube   0 11/27/2020     omeprazole (PRILOSEC) 2 mg/mL susp oral suspension    Indications: Dysphagia, unspecified type Administer 20 mL via G-tube once daily.   0 11/27/2020     metoprolol tartrate (LOPRESSOR) 25 mg tablet    Indications: Cardiac arrest (HC) Administer 0.25 tablets via G-tube 2 times daily.   0 11/27/2020     acetaminophen (TYLENOL) 325 mg tablet    Indications: History of anoxic brain injury Administer 2 tablets via G-tube every 6 hours if needed. Max acetaminophen dose: 4000mg  in 24 hrs.    0 11/27/2020     ticagrelor (BRILINTA) 90 mg tablet    Indications: Cardiac arrest (HC) Administer 1 tablet via nasogastric tube 2 times daily.   0 11/27/2020     PARoxetine (PAXIL) 20 mg tablet    Indications: Aggression Administer 1 tablet via G-tube once daily.   0 11/27/2020     atorvastatin (LIPITOR) 40 mg tablet    Indications: Cardiac arrest (HC) Administer 1 tablet via G-tube at bedtime.   0 11/27/2020     aspirin chewable 81 mg chewable tablet    Indications: Cardiac arrest (HC) Administer 1 tablet via G-tube once daily.   0 11/27/2020     amantadine HCL (SYMMETREL) 50 mg/5 mL solution    Indications: History of anoxic brain injury Administer 10 mL via G-tube every 12              Allergies     No Known Allergies    Review of Systems   A comprehensive review of 14 systems was done. Pertinent findings noted here and in history of present illness. All the rest negative.  Constitutional: Negative.  Negative for fever, chills, he has activity change, appetite change and fatigue.   HENT: Negative for congestion and facial swelling.    Eyes: Negative for photophobia, redness and visual disturbance.   Respiratory: Negative for cough and chest tightness.    Cardiovascular: Negative for chest pain, palpitations and leg swelling.   Gastrointestinal: Negative for nausea, diarrhea, constipation, blood in stool and abdominal distention.   Has incontinence  Genitourinary: Negative.    Musculoskeletal: Negative.  Remains WC bound  Skin: Negative.    Neurological: Negative for dizziness, tremors, syncope, weakness, light-headedness and headaches.   Hematological: Does not bruise/bleed easily.   Psychiatric/Behavioral: Negative.  Noted to be more awake per staff      Physical Exam     Recent Vitals 12/31/2020   Weight 154 lbs 10 oz   /66   Pulse 100   Temp 98.5   Some recent data might be hidden   t 101  p133 bp 102/65    Constitutional: Oriented to none and appears well-developed.   HEENT:   Normocephalic and atraumatic.  Eyes: Conjunctivae and EOM are normal. Pupils are equal, round, and reactive to light. No discharge.  No scleral icterus. Nose normal. Mouth/Throat: Oropharynx is clear and moist. No oropharyngeal exudate.    NECK: Normal range of motion. Neck supple. No JVD present. No tracheal deviation present. No thyromegaly present.   CARDIOVASCULAR: Normal rate, regular rhythm and intact distal pulses.  Exam reveals no gallop and no friction rub.  Systolic murmur present.  PULMONARY: Effort normal and breath sounds normal. No respiratory distress.No Wheezing or rales.  ABDOMEN: Soft. Bowel sounds are normal. No distension and no mass.  There is no tenderness. There is no rebound and no guarding. No HSM.  Peg tube present  Perianal skin intact ; slight excoriation  MUSCULOSKELETAL: Normal range of motion. No edema and no tenderness. Mild kyphosis, no tenderness.  LYMPH NODES: Has no cervical, supraclavicular, axillary and groin adenopathy.   NEUROLOGICAL: Alert and oriented to none. No cranial nerve deficit.  Normal muscle tone. Coordination normal.   aphasic  GENITOURINARY: Deferred exam.  SKIN: Skin is warm and dry. No rash noted. No erythema. No pallor.   EXTREMITIES: No cyanosis, no clubbing, no edema. No Deformity.  PSYCHIATRIC: Normal mood, affect and behavior. poorly responsive but sitting with his eyes open      Lab Results     Recent Results (from the past 240 hour(s))   COVID-19 Virus PCR MRF    Specimen: Respiratory   Result Value Ref Range    COVID-19 VIRUS SPECIMEN SOURCE Nares     2019-nCOV Not Detected    COVID-19 Virus PCR MRF    Specimen: Respiratory   Result Value Ref Range    COVID-19 VIRUS SPECIMEN SOURCE Nares     2019-nCOV Not Detected            DARIO Vargas

## 2021-06-21 NOTE — LETTER
Letter by Cortney Bahena MBBS at      Author: Cortney Bahena MBBS Service: -- Author Type: --    Filed:  Encounter Date: 12/3/2020 Status: (Other)         Patient: Scot Bishop   MR Number: [id number]   YOB: 1980   Date of Visit: 12/3/2020       [facility] Admission note      Patient: Scot Bishop  MRN: [id number]  Date of Service: 12/3/2020      [facility] [[id number]]  Reason for Visit     Chief Complaint   Patient presents with   ? Problem Visit   abnormal labs/ fever    Code Status     dnr /comfort foccused    Assessment     - acute hypernatremia na worse on recheck at 152 from 149 on last check  - fever with temp  101.2 in TCU  - acute hyperkalemia with K of 5.6 in tcu today ; improved  - hypercalcemia with ca of 10.8, improved  - elevated BUN of 39 with worsening  - profound leukocytosis with 17k  - unstable vitals  - excessive sedation and pt noted to be sleeping and poorly arousable  - =s/p anoxic brain injury   - Dysphagia on TF  -- long hospitalization with agitated /aggressive behavoir      Plan     Patient has been readmitted to the TCU  He was readmitted in the hospital and had an extensive work-up and stay with both neurology and psychiatry along with palliative care involved in his care.  He was seen as follow-up for his mood and behaviors as well as highly abnormal labs.  Recheck BMP actually shows worsening with a sodium of 152.  He has been given increase water flushes with 250 mL being given 3 times daily in addition to tube feeding with no improvement noted.  Suspect is getting dehydrated versus medication effect.  In addition he continues to run a temp so far work-up in the TCU including a clean-catch UA UC as well as additional labs and work-up has been negative.  He does not have any pressure injuries either.  Suspect is getting septic with high-grade temperature noted patient is febrile and tachypneic and noted to be quite unstable with  vitals.  In light of his family's especially his father's earlier wishes of DNR with do not hospitalize palliative care from [facility] was consulted and they saw the patient.  Labs reviewed with them and it was discussed and it was felt that he could be kept comfortable in the TCU and on hospice cares if family agreed.  Family was called and they needed some extra time to think about it.  Subsequently I and the  again call the family at which time his care plan was reviewed with his father.  His father at present is not leaning towards hospice and requested that his son be transferred to [facility].  We have reviewed his care plan and his overall poor prognosis.  This has been done in the hospital to.  They also feel that he is being overly medicated with psych meds.  Family is also debating whether they would like to take him home.  They want to provide him cares in their own home.  I think that should be possible however his father did call to ask if he was managing steps.  sw  To assist with this transition.  He will be sent to the emergency room stat.  Total time spent is 45 minutes with more than 35min in this visit spent in c/c including calling the family twice including his POA who is his father along with reviewing care plan with nursing as well as with palliative np from the hospital who came to see him.  At this point we will be resending his prior CODE STATUS of comfort focused treatments and sending him back to the hospital as per his POA who is his father's request  Care plan discussed with sw also  Pression with speech therapy since he was getting trials of eating earlier but he is nowhere close to that    History     Patient is a very pleasant 40 y.o. male who is readmitted to TCU  Pt  Is admitted to behavioral health unit due to aggressive behaviors; with hitting staff and difficulty to care  Various interventions were tried and were unsuccessful and eventually patient was started on  psychotropic meds.  Eventually his behaviors escalated and he had been sent to the behavioral health unit he comes back on high doses of medication including Seroquel trazodone.  He is also on Depakote 4 times a day.  Continued.  His recheck liver functions as ordered from the hospital however are normal.  He has bromocriptine has been discontinued and several of his other meds have been discontinued  He is noted to be sleeping excessively and quite sedated.  Family has been calling concerned that he may be overly sedated.  He was noted to be somewhat on the dehydrated side with elevated labs and he was given higher flushes of water unfortunately recheck BMP remains abnormal with a very elevated sodium of 152.  He continues with a fever his temp is 101 in the TCU consistently this morning with no improvement noted.  So far work-up has been negative including a UA UC.  Suspected that he may be aspirating or having a sepsis event.      Past Medical History     Active Ambulatory (Non-Hospital) Problems    Diagnosis   ? Aggression   ? Agitation   ? Low BP   ? G tube feedings (H)   ? Yeast infection   ? Impetigo any site   ? Hypoxic brain damage (H)   ? Acute respiratory failure with hypoxia (H)   ? Gastrojejunostomy tube status (H)   ? Hypoxic brain injury (H)   ? Dysphagia   ? Cardiac arrest (H)   ? Primary Lymphadenitis   ? Nicotine Dependence   ? Backache     Past Medical History:   Diagnosis Date   ? Acute respiratory failure with hypoxia (H)    ? Aggression 11/09/2020   ? Agitation 09/21/2020   ? LOWELL (acute kidney injury) (H)    ? Back injury, sequela 10/27/2017   ? Cardiac arrest (H)    ? Cardiac arrest (H) 05/17/2020   ? Cognitive disorder 11/11/2020   ? Dysphagia    ? Dysphagia 11/11/2020   ? Gastrojejunostomy tube status (H) 11/11/2020   ? HTN (hypertension)    ? Hypoxic ischemic encephalopathy    ? Low blood pressure 09/17/2020   ? Nonverbal 11/11/2020   ? NSTEMI (non-ST elevated myocardial infarction) (H)     ? TBI (traumatic brain injury) (H) 06/25/2020       Past Social History     Reviewed, and he  reports that he has been smoking. He has been smoking about 1.00 pack per day. He has never used smokeless tobacco. He reports current alcohol use. He reports that he does not use drugs.    Family History     Reviewed, and family history includes Cancer in his maternal grandfather; Diabetes in his maternal aunt; Diabetes type I in his maternal aunt; Lung cancer in his maternal grandfather; Other in his father.    Medication List   Post Discharge Medication Reconciliation Status:    QUEtiapine (SEROQUEL) 50 mg tablet    Indications: Aggression Administer 1 tablet via G-tube 3 times daily.   0 11/27/2020     haloperidoL (HALDOL) 5 mg tablet    Indications: Aggression Administer 1 tablet via G-tube 3 times daily.   0 11/27/2020     divalproex sprinkles (DEPAKOTE SPRINKLES) 125 mg capsule    Indications: Aggression 4 capsules 2 times daily. G tube   0 11/27/2020     traZODone (DESYREL) 50 mg tablet    Indications: Aggression Administer 1 tablet via G-tube at bedtime.   0 11/27/2020     potassium chloride (K-APOLLO) 20 mEq packet    Indications: Dehydration 1 Packet once daily with a meal. G-tube   0 11/27/2020     omeprazole (PRILOSEC) 2 mg/mL susp oral suspension    Indications: Dysphagia, unspecified type Administer 20 mL via G-tube once daily.   0 11/27/2020     metoprolol tartrate (LOPRESSOR) 25 mg tablet    Indications: Cardiac arrest (HC) Administer 0.25 tablets via G-tube 2 times daily.   0 11/27/2020     acetaminophen (TYLENOL) 325 mg tablet    Indications: History of anoxic brain injury Administer 2 tablets via G-tube every 6 hours if needed. Max acetaminophen dose: 4000mg in 24 hrs.    0 11/27/2020     ticagrelor (BRILINTA) 90 mg tablet    Indications: Cardiac arrest (HC) Administer 1 tablet via nasogastric tube 2 times daily.   0 11/27/2020     PARoxetine (PAXIL) 20 mg tablet    Indications: Aggression Administer 1  tablet via G-tube once daily.   0 11/27/2020     atorvastatin (LIPITOR) 40 mg tablet    Indications: Cardiac arrest (HC) Administer 1 tablet via G-tube at bedtime.   0 11/27/2020     aspirin chewable 81 mg chewable tablet    Indications: Cardiac arrest (HC) Administer 1 tablet via G-tube once daily.   0 11/27/2020     amantadine HCL (SYMMETREL) 50 mg/5 mL solution    Indications: History of anoxic brain injury Administer 10 mL via G-tube every 12              Allergies     No Known Allergies    Review of Systems   A comprehensive review of 14 systems was done. Pertinent findings noted here and in history of present illness. All the rest negative.  Constitutional: Negative.  Negative for fever, chills, he has activity change, appetite change and fatigue.   HENT: Negative for congestion and facial swelling.    Eyes: Negative for photophobia, redness and visual disturbance.   Respiratory: Negative for cough and chest tightness.    Cardiovascular: Negative for chest pain, palpitations and leg swelling.   Gastrointestinal: Negative for nausea, diarrhea, constipation, blood in stool and abdominal distention.   Has incontinence  Noted to be scratching his buttocks and has rectal / perianal bleeding  Genitourinary: Negative.    Musculoskeletal: Negative.  Remains WC bound  Skin: Negative.    Neurological: Negative for dizziness, tremors, syncope, weakness, light-headedness and headaches.   Hematological: Does not bruise/bleed easily.   Psychiatric/Behavioral: Negative.  Sedated and sleepy      Physical Exam     Recent Vitals 12/1/2020   Weight 166 lbs   BP 98/72   Pulse 100   Temp 97.1   Temp src -   SpO2 -   Some recent data might be hidden   t 101  p133 bp 102/65    Constitutional: Oriented to none and appears well-developed.   HEENT:  Normocephalic and atraumatic.  Eyes: Conjunctivae and EOM are normal. Pupils are equal, round, and reactive to light. No discharge.  No scleral icterus. Nose normal. Mouth/Throat:  Oropharynx is clear and moist. No oropharyngeal exudate.    NECK: Normal range of motion. Neck supple. No JVD present. No tracheal deviation present. No thyromegaly present.   CARDIOVASCULAR: Normal rate, regular rhythm and intact distal pulses.  Exam reveals no gallop and no friction rub.  Systolic murmur present.  PULMONARY: Effort normal and breath sounds normal. No respiratory distress.No Wheezing or rales.  ABDOMEN: Soft. Bowel sounds are normal. No distension and no mass.  There is no tenderness. There is no rebound and no guarding. No HSM.  Peg tube present  Perianal skin intact ; slight excoriation  MUSCULOSKELETAL: Normal range of motion. No edema and no tenderness. Mild kyphosis, no tenderness.  LYMPH NODES: Has no cervical, supraclavicular, axillary and groin adenopathy.   NEUROLOGICAL: Alert and oriented to none. No cranial nerve deficit.  Normal muscle tone. Coordination normal.   GENITOURINARY: Deferred exam.  SKIN: Skin is warm and dry. No rash noted. No erythema. No pallor.   EXTREMITIES: No cyanosis, no clubbing, no edema. No Deformity.  PSYCHIATRIC: Normal mood, affect and behavior.sleepy and poorly responsive      Lab Results     Recent Results (from the past 240 hour(s))   Basic Metabolic Panel   Result Value Ref Range    Sodium 149 (H) 136 - 145 mmol/L    Potassium 5.6 (H) 3.5 - 5.0 mmol/L    Chloride 111 (H) 98 - 107 mmol/L    CO2 14 (L) 22 - 31 mmol/L    Anion Gap, Calculation 24 (H) 5 - 18 mmol/L    Glucose 101 70 - 125 mg/dL    Calcium 10.8 (H) 8.5 - 10.5 mg/dL    BUN 38 (H) 8 - 22 mg/dL    Creatinine 1.11 0.70 - 1.30 mg/dL    GFR MDRD Af Amer >60 >60 mL/min/1.73m2    GFR MDRD Non Af Amer >60 >60 mL/min/1.73m2   Magnesium   Result Value Ref Range    Magnesium 2.3 1.8 - 2.6 mg/dL   Hepatic Profile   Result Value Ref Range    Bilirubin, Total 0.8 0.0 - 1.0 mg/dL    Bilirubin, Direct 0.2 <=0.5 mg/dL    Protein, Total 9.3 (H) 6.0 - 8.0 g/dL    Albumin 4.4 3.5 - 5.0 g/dL    Alkaline Phosphatase  104 45 - 120 U/L    AST 30 0 - 40 U/L    ALT 25 0 - 45 U/L   HM1 (CBC with Diff)   Result Value Ref Range    WBC 17.0 (H) 4.0 - 11.0 thou/uL    RBC 5.47 4.40 - 6.20 mill/uL    Hemoglobin 15.9 14.0 - 18.0 g/dL    Hematocrit 52.2 40.0 - 54.0 %    MCV 95 80 - 100 fL    MCH 29.1 27.0 - 34.0 pg    MCHC 30.5 (L) 32.0 - 36.0 g/dL    RDW 15.5 (H) 11.0 - 14.5 %    Platelets 346 140 - 440 thou/uL    MPV 12.0 8.5 - 12.5 fL    Neutrophils % 86 (H) 50 - 70 %    Lymphocytes % 7 (L) 20 - 40 %    Monocytes % 6 2 - 10 %    Eosinophils % 0 0 - 6 %    Basophils % 0 0 - 2 %    Immature Granulocyte % 1 (H) <=0 %    Neutrophils Absolute 14.6 (H) 2.0 - 7.7 thou/uL    Lymphocytes Absolute 1.2 0.8 - 4.4 thou/uL    Monocytes Absolute 1.0 (H) 0.0 - 0.9 thou/uL    Eosinophils Absolute 0.0 0.0 - 0.4 thou/uL    Basophils Absolute 0.1 0.0 - 0.2 thou/uL    Immature Granulocyte Absolute 0.2 (H) <=0.0 thou/uL   Urinalysis   Result Value Ref Range    Color, UA Orange (!) Colorless, Yellow, Straw, Light Yellow    Clarity, UA Turbid (!) Clear    Glucose, UA Negative Negative    Bilirubin, UA Negative Negative    Ketones, UA Negative Negative    Specific Gravity, UA 1.034 (H) 1.001 - 1.030    Blood, UA Negative Negative    pH, UA 6.0 4.5 - 8.0    Protein, UA Trace (!) Negative mg/dL    Urobilinogen, UA <2.0 E.U./dL <2.0 E.U./dL, 2.0 E.U./dL    Nitrite, UA Negative Negative    Leukocytes, UA Negative Negative    Bacteria, UA None Seen None Seen hpf    RBC, UA 0-2 None Seen, 0-2 hpf    WBC, UA 0-5 None Seen, 0-5 hpf    Squam Epithel, UA 0-5 None Seen, 0-5 lpf    Amorphous, UA Many (!) None Seen   Basic Metabolic Panel   Result Value Ref Range    Sodium 152 (HH) 136 - 145 mmol/L    Potassium 3.9 3.5 - 5.0 mmol/L    Chloride 114 (H) 98 - 107 mmol/L    CO2 27 22 - 31 mmol/L    Anion Gap, Calculation 11 5 - 18 mmol/L    Glucose 135 (H) 70 - 125 mg/dL    Calcium 9.8 8.5 - 10.5 mg/dL    BUN 39 (H) 8 - 22 mg/dL    Creatinine 0.91 0.70 - 1.30 mg/dL    GFR MDRD Af  Amer >60 >60 mL/min/1.73m2    GFR MDRD Non Af Amer >60 >60 mL/min/1.73m2               DARIO Vargas

## 2021-06-21 NOTE — LETTER
Letter by Cortney Bahena MBBS at      Author: Cortney Bahena MBBS Service: -- Author Type: --    Filed:  Encounter Date: 1/26/2021 Status: (Other)         [facility]  [address] [facility] [address] [facility] [address]                                  January 26, 2021    Patient: Scot Bishop   MR Number: [id number]   YOB: 1980   Date of Visit: 1/26/2021     Dear Dr. Gan:    Thank you for referring Scot Bishop to me for evaluation. Below are the relevant portions of my assessment and plan of care.    If you have questions, please do not hesitate to call me. I look forward to following Scot along with you.    Sincerely,        DARIO Vargas          CC  No Recipients  Cortney Bahena MBBS  1/26/2021  4:39 PM  [facility] Admission note      Patient: Scot Bishop  MRN: [id number]        [facility] [[id number]]  Reason for Visit     Chief Complaint   Patient presents with   ? Review Of Multiple Medical Conditions   Evaluated for review of his psychotropic medications1    Code Status     dnr /comfort foccused    Assessment     - s/p anoxic brain injury   - Dysphagia on TF  -- long hospitalization with agitated /aggressive behavoir  Patient on multiple psychotropic meds.  -Hypotension.  - FTT    Plan     Patient has been readmitted to the TCU  He remains bed/WC bound  Mood and behaviors reviewed with staff.  He is improved but has agitation- remains hard to redirect  Non ambulatory and non responsive  No improvement in permanent vegetative state noted on exam  Completely dependent on staff for ADLs  No improvement in verbal communication either  Recheck BMPs have been stable  Continues to have occasional lability in temperature and blood pressures due to an autonomic insufficiency.  Family is working on a discharge plan to his father's home he will require a lot of adaptive equipment      History     Patient is a very pleasant 40 y.o. male who is  readmitted to TCU  Pt  Is admitted to behavioral health unit due to aggressive behaviors; with hitting staff and difficulty to care  Various interventions were tried and were unsuccessful and eventually patient was started on psychotropic meds.  Eventually his behaviors escalated and he had been sent to the behavioral health unit he comes back on high doses of medication including Seroquel trazodone.  Recently Depakote was reduced to 3 times a day with improvement noted.  Today was sitting in a Broda chair.  He continues to be nonverbal with no improvement in his mood and behaviors.  Appears to be in a permanent vegetative state.  Unfortunately he is hard to redirect and gets combative very quickly  Staff feels he is tolerating his psych meds well and are afraid of his behaviors escalating up they will discontinued the recommended continuing his current medication regimen with no changes  He recently had pulled his G-tube out  Since then he has tolerated his tube feeding well  This was replaced.  He also was noted to have behaviors including picking and scratching at his skin repetitively he is hard to redirect  He was noted to be talking to staff but his interaction at baseline remains poor.  He does not make any eye contact.  He was noted to be scratching and picking at his skin from which he is hard to redirect he has been given various sensory tools to keep him engaged.  At baseline nonambulatory and requires a Broda chair he is an assist of all cares.  In addition he has chronically low blood pressures which are felt to be secondary to his autonomic instability.  Blood pressures continue to fluctuate widely  He continues to require tube feeding.  At present he is not able to self initiate eating.  He also has low blood pressures with intermittent low-grade temperatures noted work-up in the past has been negative including extensive imaging.  It is felt secondary to autonomic instability  His father is working  on discharge planning to his own home staff is helping him get adaptive equipment to make the discharge plan successful  Blood pressures and vitals remain labile weights have been stable recently  Past Medical History     Active Ambulatory (Non-Hospital) Problems    Diagnosis   ? Sedated   ? Thrush, oral   ? Fever   ? Aggression   ? Agitation   ? Low BP   ? G tube feedings (H)   ? Yeast infection   ? Impetigo any site   ? Hypoxic brain damage (H)   ? Acute respiratory failure with hypoxia (H)   ? Gastrojejunostomy tube status (H)   ? Hypoxic brain injury (H)   ? Dysphagia   ? Cardiac arrest (H)   ? Primary Lymphadenitis   ? Nicotine Dependence   ? Backache     Past Medical History:   Diagnosis Date   ? Acute respiratory failure with hypoxia (H)    ? Aggression 11/09/2020   ? Agitation 09/21/2020   ? LOWELL (acute kidney injury) (H)    ? Back injury, sequela 10/27/2017   ? Cardiac arrest (H)    ? Cardiac arrest (H) 05/17/2020   ? Cognitive disorder 11/11/2020   ? Dysphagia    ? Dysphagia 11/11/2020   ? Gastrojejunostomy tube status (H) 11/11/2020   ? HTN (hypertension)    ? Hypoxic ischemic encephalopathy    ? Low blood pressure 09/17/2020   ? Nonverbal 11/11/2020   ? NSTEMI (non-ST elevated myocardial infarction) (H)    ? TBI (traumatic brain injury) (H) 06/25/2020       Past Social History     Reviewed, and he  reports that he has been smoking. He has been smoking about 1.00 pack per day. He has never used smokeless tobacco. He reports current alcohol use. He reports that he does not use drugs.    Family History     Reviewed, and family history includes Cancer in his maternal grandfather; Diabetes in his maternal aunt; Diabetes type I in his maternal aunt; Lung cancer in his maternal grandfather; Other in his father.    Medication List   Post Discharge Medication Reconciliation Status:    QUEtiapine (SEROQUEL) 50 mg tablet    Indications: Aggression Administer 1 tablet via G-tube 3 times daily.   0 11/27/2020      haloperidoL (HALDOL) 5 mg tablet    Indications: Aggression Administer 1 tablet via G-tube 3 times daily.   0 11/27/2020     divalproex sprinkles (DEPAKOTE SPRINKLES) 125 mg capsule    Indications: Aggression 4 capsules 2 times daily. G tube   0 11/27/2020     traZODone (DESYREL) 50 mg tablet    Indications: Aggression Administer 1 tablet via G-tube at bedtime.   0 11/27/2020     potassium chloride (K-APOLLO) 20 mEq packet    Indications: Dehydration 1 Packet once daily with a meal. G-tube   0 11/27/2020     omeprazole (PRILOSEC) 2 mg/mL susp oral suspension    Indications: Dysphagia, unspecified type Administer 20 mL via G-tube once daily.   0 11/27/2020     metoprolol tartrate (LOPRESSOR) 25 mg tablet    Indications: Cardiac arrest (HC) Administer 0.25 tablets via G-tube 2 times daily.   0 11/27/2020     acetaminophen (TYLENOL) 325 mg tablet    Indications: History of anoxic brain injury Administer 2 tablets via G-tube every 6 hours if needed. Max acetaminophen dose: 4000mg in 24 hrs.    0 11/27/2020     ticagrelor (BRILINTA) 90 mg tablet    Indications: Cardiac arrest (HC) Administer 1 tablet via nasogastric tube 2 times daily.   0 11/27/2020     PARoxetine (PAXIL) 20 mg tablet    Indications: Aggression Administer 1 tablet via G-tube once daily.   0 11/27/2020     atorvastatin (LIPITOR) 40 mg tablet    Indications: Cardiac arrest (HC) Administer 1 tablet via G-tube at bedtime.   0 11/27/2020     aspirin chewable 81 mg chewable tablet    Indications: Cardiac arrest (HC) Administer 1 tablet via G-tube once daily.   0 11/27/2020     amantadine HCL (SYMMETREL) 50 mg/5 mL solution    Indications: History of anoxic brain injury Administer 10 mL via G-tube every 12              Allergies     No Known Allergies    Review of Systems   A comprehensive review of 14 systems was done. Pertinent findings noted here and in history of present illness. All the rest negative.  Constitutional: Negative.  Negative for fever, chills,  he has activity change, appetite change and fatigue.   HENT: Negative for congestion and facial swelling.    Eyes: Negative for photophobia, redness and visual disturbance.   Respiratory: Negative for cough and chest tightness.    Cardiovascular: Negative for chest pain, palpitations and leg swelling.   Gastrointestinal: Negative for nausea, diarrhea, constipation, blood in stool and abdominal distention.   Has incontinence  Genitourinary: Negative.    Musculoskeletal: Negative.  Remains WC bound  Skin: Negative.    Neurological: Negative for dizziness, tremors, syncope, weakness, light-headedness and headaches.   Hematological: Does not bruise/bleed easily.   Psychiatric/Behavioral: Negative.  Noted to be more awake per staff      Physical Exam     Recent Vitals 1/26/2021   Weight 154 lbs 3 oz   BP 93/61   Pulse 99   Temp 98.3   Temp src -   SpO2 96   Some recent data might be hidden       Constitutional: Oriented to none and appears well-developed.   HEENT:  Normocephalic and atraumatic.  Eyes: Conjunctivae and EOM are normal. Pupils are equal, round, and reactive to light. No discharge.  No scleral icterus. Nose normal. Mouth/Throat: Oropharynx is clear and moist. No oropharyngeal exudate.    NECK: Normal range of motion. Neck supple. No JVD present. No tracheal deviation present. No thyromegaly present.   CARDIOVASCULAR: Normal rate, regular rhythm and intact distal pulses.  Exam reveals no gallop and no friction rub.  Systolic murmur present.  PULMONARY: Effort normal and breath sounds normal. No respiratory distress.No Wheezing or rales.  ABDOMEN: Soft. Bowel sounds are normal. No distension and no mass.  There is no tenderness. There is no rebound and no guarding. No HSM.  Peg tube present  Perianal skin intact ; slight excoriation  MUSCULOSKELETAL: Normal range of motion. No edema and no tenderness. Mild kyphosis, no tenderness.  LYMPH NODES: Has no cervical, supraclavicular, axillary and groin adenopathy.    NEUROLOGICAL: Alert and oriented to none. No cranial nerve deficit.  Normal muscle tone. Coordination normal.   aphasic  GENITOURINARY: Deferred exam.  SKIN: Skin is warm and dry. No rash noted. No erythema. No pallor.   EXTREMITIES: No cyanosis, no clubbing, no edema. No Deformity.  PSYCHIATRIC:abNormal mood, affect and behavior. poorly responsive but sitting with his eyes open      Lab Results     Recent Results (from the past 240 hour(s))   COVID-19 Virus PCR MRF    Specimen: Respiratory   Result Value Ref Range    COVID-19 VIRUS SPECIMEN SOURCE Nares     2019-nCOV Not Detected    COVID-19 Virus PCR MRF    Specimen: Respiratory   Result Value Ref Range    COVID-19 VIRUS SPECIMEN SOURCE Nares     2019-nCOV Not Detected            DARIO Vargas

## 2021-06-21 NOTE — LETTER
Letter by Cortney Bahena MBBS at      Author: Cortney Bahena MBBS Service: -- Author Type: --    Filed:  Encounter Date: 1/19/2021 Status: (Other)         [facility]  [address] [address]                                  January 19, 2021    Patient: Scot Bishop   MR Number: [id number]   YOB: 1980   Date of Visit: 1/19/2021     Dear Dr. Gan:    Thank you for referring Scot Bishop to me for evaluation. Below are the relevant portions of my assessment and plan of care.    If you have questions, please do not hesitate to call me. I look forward to following Scot along with you.    Sincerely,        DARIO Vargas          CC  No Recipients  Cortney Bahena MBBS  1/19/2021  2:22 PM  [facility] Admission note      Patient: Scot Bishop  MRN: [id number]        [facility] [[id number]]  Reason for Visit     Chief Complaint   Patient presents with   ? Follow Up   Evaluated for review of his psychotropic medications1    Code Status     dnr /comfort foccused    Assessment     - s/p anoxic brain injury   - Dysphagia on TF  -- long hospitalization with agitated /aggressive behavoir  Patient on multiple psychotropic meds.  -Hypotension.  - FTT    Plan     Patient has been readmitted to the TCU  He remains bed/WC bound  Mood and behaviors reviewed with staff.  He continues to have intermittent agitation.  He recently was sent to the emergency room after he managed to pull his GJ tube out.  This was replaced.  He also has some skeptical behaviors including scratching and picking on his skin.  Blood pressures remain labile and frequently low.  No improvement in cognitive status.  Tolerating his tube feedings well.  Staff is working on finding appropriate placement in light of his profound impairment in cognition  He requires complete assistance with all his ADLs    History     Patient is a very pleasant 40 y.o. male who is readmitted  to TCU  Pt  Is admitted to behavioral health unit due to aggressive behaviors; with hitting staff and difficulty to care  Various interventions were tried and were unsuccessful and eventually patient was started on psychotropic meds.  Eventually his behaviors escalated and he had been sent to the behavioral health unit he comes back on high doses of medication including Seroquel trazodone.  Recently Depakote was reduced to 3 times a day with improvement noted.  Today was sitting in a Broda chair.  Since then he has become more alert.  However he continues to have intermittent episodes of agitation.  He is hard to redirect and can get quite combative.  Staff reports that they have sent him back to the emergency room after he managed to pull his GJ tube out.  This was replaced.  He also was noted to have behaviors including picking and scratching at his skin repetitively he is hard to redirect  He was noted to be talking to staff but his interaction at baseline remains poor.  He does not make any eye contact.  He was noted to be scratching and picking at his skin from which he is hard to redirect he has been given various sensory tools to keep him engaged.  At baseline nonambulatory and requires a Broda chair he is an assist of all cares.  In addition he has chronically low blood pressures which are felt to be secondary to his autonomic instability.  He continues to require tube feeding.  At present he is not able to self initiate eating.  He also has low blood pressures with intermittent low-grade temperatures noted work-up in the past has been negative including extensive imaging.  It is felt secondary to autonomic instability  Blood pressures and vitals remain labile weights have been stable recently  Past Medical History     Active Ambulatory (Non-Hospital) Problems    Diagnosis   ? Sedated   ? Thrush, oral   ? Fever   ? Aggression   ? Agitation   ? Low BP   ? G tube feedings (H)   ? Yeast infection   ? Impetigo  any site   ? Hypoxic brain damage (H)   ? Acute respiratory failure with hypoxia (H)   ? Gastrojejunostomy tube status (H)   ? Hypoxic brain injury (H)   ? Dysphagia   ? Cardiac arrest (H)   ? Primary Lymphadenitis   ? Nicotine Dependence   ? Backache     Past Medical History:   Diagnosis Date   ? Acute respiratory failure with hypoxia (H)    ? Aggression 11/09/2020   ? Agitation 09/21/2020   ? LOWELL (acute kidney injury) (H)    ? Back injury, sequela 10/27/2017   ? Cardiac arrest (H)    ? Cardiac arrest (H) 05/17/2020   ? Cognitive disorder 11/11/2020   ? Dysphagia    ? Dysphagia 11/11/2020   ? Gastrojejunostomy tube status (H) 11/11/2020   ? HTN (hypertension)    ? Hypoxic ischemic encephalopathy    ? Low blood pressure 09/17/2020   ? Nonverbal 11/11/2020   ? NSTEMI (non-ST elevated myocardial infarction) (H)    ? TBI (traumatic brain injury) (H) 06/25/2020       Past Social History     Reviewed, and he  reports that he has been smoking. He has been smoking about 1.00 pack per day. He has never used smokeless tobacco. He reports current alcohol use. He reports that he does not use drugs.    Family History     Reviewed, and family history includes Cancer in his maternal grandfather; Diabetes in his maternal aunt; Diabetes type I in his maternal aunt; Lung cancer in his maternal grandfather; Other in his father.    Medication List   Post Discharge Medication Reconciliation Status:    QUEtiapine (SEROQUEL) 50 mg tablet    Indications: Aggression Administer 1 tablet via G-tube 3 times daily.   0 11/27/2020     haloperidoL (HALDOL) 5 mg tablet    Indications: Aggression Administer 1 tablet via G-tube 3 times daily.   0 11/27/2020     divalproex sprinkles (DEPAKOTE SPRINKLES) 125 mg capsule    Indications: Aggression 4 capsules 2 times daily. G tube   0 11/27/2020     traZODone (DESYREL) 50 mg tablet    Indications: Aggression Administer 1 tablet via G-tube at bedtime.   0 11/27/2020     potassium chloride (K-APOLLO) 20 mEq  packet    Indications: Dehydration 1 Packet once daily with a meal. G-tube   0 11/27/2020     omeprazole (PRILOSEC) 2 mg/mL susp oral suspension    Indications: Dysphagia, unspecified type Administer 20 mL via G-tube once daily.   0 11/27/2020     metoprolol tartrate (LOPRESSOR) 25 mg tablet    Indications: Cardiac arrest (HC) Administer 0.25 tablets via G-tube 2 times daily.   0 11/27/2020     acetaminophen (TYLENOL) 325 mg tablet    Indications: History of anoxic brain injury Administer 2 tablets via G-tube every 6 hours if needed. Max acetaminophen dose: 4000mg in 24 hrs.    0 11/27/2020     ticagrelor (BRILINTA) 90 mg tablet    Indications: Cardiac arrest (HC) Administer 1 tablet via nasogastric tube 2 times daily.   0 11/27/2020     PARoxetine (PAXIL) 20 mg tablet    Indications: Aggression Administer 1 tablet via G-tube once daily.   0 11/27/2020     atorvastatin (LIPITOR) 40 mg tablet    Indications: Cardiac arrest (HC) Administer 1 tablet via G-tube at bedtime.   0 11/27/2020     aspirin chewable 81 mg chewable tablet    Indications: Cardiac arrest (HC) Administer 1 tablet via G-tube once daily.   0 11/27/2020     amantadine HCL (SYMMETREL) 50 mg/5 mL solution    Indications: History of anoxic brain injury Administer 10 mL via G-tube every 12              Allergies     No Known Allergies    Review of Systems   A comprehensive review of 14 systems was done. Pertinent findings noted here and in history of present illness. All the rest negative.  Constitutional: Negative.  Negative for fever, chills, he has activity change, appetite change and fatigue.   HENT: Negative for congestion and facial swelling.    Eyes: Negative for photophobia, redness and visual disturbance.   Respiratory: Negative for cough and chest tightness.    Cardiovascular: Negative for chest pain, palpitations and leg swelling.   Gastrointestinal: Negative for nausea, diarrhea, constipation, blood in stool and abdominal distention.   Has  incontinence  Genitourinary: Negative.    Musculoskeletal: Negative.  Remains WC bound  Skin: Negative.    Neurological: Negative for dizziness, tremors, syncope, weakness, light-headedness and headaches.   Hematological: Does not bruise/bleed easily.   Psychiatric/Behavioral: Negative.  Noted to be more awake per staff      Physical Exam     Recent Vitals 1/19/2021   Weight 151 lbs 3 oz   /64   Pulse 98   Temp 98.1   Temp src -   SpO2 95   Some recent data might be hidden       Constitutional: Oriented to none and appears well-developed.   HEENT:  Normocephalic and atraumatic.  Eyes: Conjunctivae and EOM are normal. Pupils are equal, round, and reactive to light. No discharge.  No scleral icterus. Nose normal. Mouth/Throat: Oropharynx is clear and moist. No oropharyngeal exudate.    NECK: Normal range of motion. Neck supple. No JVD present. No tracheal deviation present. No thyromegaly present.   CARDIOVASCULAR: Normal rate, regular rhythm and intact distal pulses.  Exam reveals no gallop and no friction rub.  Systolic murmur present.  PULMONARY: Effort normal and breath sounds normal. No respiratory distress.No Wheezing or rales.  ABDOMEN: Soft. Bowel sounds are normal. No distension and no mass.  There is no tenderness. There is no rebound and no guarding. No HSM.  Peg tube present  Perianal skin intact ; slight excoriation  MUSCULOSKELETAL: Normal range of motion. No edema and no tenderness. Mild kyphosis, no tenderness.  LYMPH NODES: Has no cervical, supraclavicular, axillary and groin adenopathy.   NEUROLOGICAL: Alert and oriented to none. No cranial nerve deficit.  Normal muscle tone. Coordination normal.   aphasic  GENITOURINARY: Deferred exam.  SKIN: Skin is warm and dry. No rash noted. No erythema. No pallor.   EXTREMITIES: No cyanosis, no clubbing, no edema. No Deformity.  PSYCHIATRIC: Normal mood, affect and behavior. poorly responsive but sitting with his eyes open      Lab Results     Recent  Results (from the past 240 hour(s))   COVID-19 Virus PCR MRF    Specimen: Respiratory   Result Value Ref Range    COVID-19 VIRUS SPECIMEN SOURCE Nares     2019-nCOV Not Detected            DARIO Vargas

## 2021-06-21 NOTE — LETTER
Letter by Fouzia High CNP at      Author: Fouzia High CNP Service: -- Author Type: --    Filed:  Encounter Date: 2020 Status: (Other)         Patient: Scot Bishop   MR Number: [id number]   YOB: 1980   Date of Visit: 2020       [facility]      NAME:  Scot Bishop             :  1980    MRN: [id number]    CODE STATUS:  Full    FACILITY: [facility] [[id number]]       CHIEF COMPLAIN/REASON FOR VISIT:  Chief Complaint   Patient presents with   ? Review Of Multiple Medical Conditions       HISTORY OF PRESENT ILLNESS: Scot Bishop is a 40 y.o. male being seen for review of multiple medical conditions . Nursing reports he has had a leaking gtube, it was not leaking today. They can monitor. Met with Lucy JONES and we reviewed advancement of diet. At this juncture needs enteral food for nutrition. He basically eating for pleasure feedings.  He is a TBI. ST has started supervised food feedings, and they will increase food textures. Nursing reports ongoing agitation, and resistive with cares due to his brain injury.  [place] to [place] [facility] in May 2020. At that juncture he was in cardiac cath lab and suffered a hypoxic brain injury, high temp and thrombotic event. He is seen in his room today. Non verbal and did not track with his eyes. He will make eye contact. He has repetitive movement while in bed.He has repetitive laughing today. He varies between crying and laughing per nursing.  Moving legs and arms. Per nursing he  Slid from bed over week end, no apparent injuries, they are placing a scoop mattress for preventive falls. . He will need ongoing rehab services with PT/OT/ST. Has Gtube due to his dysphagia, which reviewed that weight has been stable.. Unfortunealy due to Scot Hypoxic brain injury he will not be able to understand hygiene education and nursing will need to meet these needs for pt. Therapy  continues to work with him, seen ambualting with SBA two.Staff to anticipate needs and provide good elizabeth care two times a day and prn.   continues to work with family towards DC to a group home after TCU stay as patient will be unable to care for self, remains dependant on staff for all ADL and mobility as well as GTube for feedings.Wt stable but he needs frequent 1 to 1 when up out of bed .  Nursing reports ongoing agitation, prn.He has scheduled seroquel due to anoxic brain injury      . No Known Allergies:     Current Outpatient Medications   Medication Sig   ? acetaminophen (TYLENOL) 650 mg/20.3 mL Soln 650 mg every 6 (six) hours as needed. Via PEG-tube    ? amantadine HCL (SYMMETREL) 50 mg/5 mL solution 100 mg every 12 (twelve) hours. Via PEG-tube   ? aspirin 81 MG EC tablet 81 mg daily. Via PEG-tube   ? atorvastatin (LIPITOR) 40 MG tablet 40 mg at bedtime. Via PEG-tube   ? bromocriptine (PARLODEL) 2.5 mg tablet 2.5 mg 2 (two) times a day. Via PEG-tube   ? chlorhexidine (PERIDEX) 0.12 % solution Apply 15 mL to the mouth or throat 4 (four) times a day.   ? levETIRAcetam (KEPPRA) 100 mg/mL solution 1,000 mg 2 (two) times a day. Via PEG-tube   ? lisinopriL (PRINIVIL,ZESTRIL) 2.5 MG tablet 1.25 mg daily. Via PEG-tube, hold if SBP <90   ? LORazepam (ATIVAN) 0.5 MG tablet 1 tablet (0.5 mg total) by G-tube route 4 (four) times a day for 3 days.   ? metoclopramide (REGLAN) 5 MG tablet 5 mg 2 (two) times a day. Via PEG-tube   ? metoprolol tartrate (LOPRESSOR) 25 MG tablet 6.25 mg 2 (two) times a day. Via PEG-tube, hold if SBP <90, HR <60   ? neomycin-bacitracin-polymyxin B (NEOSPORIN) 3.5-400-5,000 mg-unit-unit OiPk ointment Apply 1 application topically 2 (two) times a day.   ? omeprazole (PRILOSEC) 2 mg/mL SusR suspension 40 mg daily before breakfast. Via PEG-tube   ? pantoprazole (PROTONIX) 40 MG tablet 40 mg daily. Via PEG-tube   ? potassium bicarb-citric acid 20 mEq TbEF 20 mEq daily. Via PEG-tube   ?  QUEtiapine (SEROQUEL) 25 MG tablet 25 mg 2 (two) times a day. Via PEG-tube give 25 mg am/pm with 12.5 mg mid day due to increased agitation   ? ticagrelor (BRILINTA) 90 mg Tab 90 mg 2 (two) times a day. Via PEG-tube   ? traZODone (DESYREL) 50 MG tablet 50 mg at bedtime. Via PEG-tube   ? white petrolatum (AQUAPHOR ORIGINAL) 41 % Oint Apply 1 application topically 2 (two) times a day. Apply to feet/legs         REVIEW OF SYSTEMS:  Unable to verbalize due to aphasia    PHYSICAL EXAMINATION:  Vitals:    11/07/20 1330   BP: 99/59   Pulse: 89   Temp: 97.2  F (36.2  C)   Weight: 166 lb (75.3 kg)         GENERAL: Does not follow simple commands, makes eye contact, non verbal.  HEENT: Head is normocephalic with normal hair distribution. No evidence of trauma. Ears: No acute purulent discharge. Eyes: Conjunctivae pink with no scleral jaundice. Nose: Normal mucosa and septum. NECK: Supple with no cervical or supraclavicular lymphadenopathy. Trachea is midline, healing trach stoma  Lungs : He is CTA  EXTREMITIES: Atraumatic. Full range of motion on both upper and lower extremities, there is no tenderness to palpation, no pedal edema, no cyanosis or clubbing, no calf tenderness, normal cap refill, no joint swelling.  SKIN: Warm and dry, no erythema noted, abdomen with g tube  NEUROLOGICAL: The patient is oriented to person, TBI    Social Distancing with PPE practiced due to Covid 19    LABS:    Lab Results   Component Value Date    WBC 8.3 08/06/2020    HGB 11.0 (L) 08/06/2020    HCT 34.9 (L) 08/06/2020    MCV 90 08/06/2020     08/06/2020       Results for orders placed or performed in visit on 10/26/20   Basic Metabolic Panel   Result Value Ref Range    Sodium 142 136 - 145 mmol/L    Potassium 4.8 3.5 - 5.0 mmol/L    Chloride 107 98 - 107 mmol/L    CO2 27 22 - 31 mmol/L    Anion Gap, Calculation 8 5 - 18 mmol/L    Glucose 104 70 - 125 mg/dL    Calcium 9.9 8.5 - 10.5 mg/dL    BUN 15 8 - 22 mg/dL    Creatinine 0.82 0.70 -  1.30 mg/dL    GFR MDRD Af Amer >60 >60 mL/min/1.73m2    GFR MDRD Non Af Amer >60 >60 mL/min/1.73m2           No results found for: HGBA1C  No results found for: [id number]  No results found for: PHTUQMZV18    ASSESSMENT/PLAN:  1. Hypoxic brain damage (H)    2. G tube feedings (H)      1. Hypoxic Brain injury: Scot has plateau at current level. He has scheduled  Seroquel due to agitation. discontinue prn Seroquel today. Has aggressive behaviors requiring 1 to 1 services at times.    2. Gtube feedings: Will require ongoin feeds enteral, on pleasure foods only and needs strict supervision with meals. His tube flushed w/o any leaking today.   Electronically signed by:  Fouzia High CNP  This progress note was completed using Dragon software and there may be grammatical errors.

## 2021-06-21 NOTE — LETTER
Letter by Fouzia High CNP at      Author: Fouzia High CNP Service: -- Author Type: --    Filed:  Encounter Date: 2020 Status: (Other)         Patient: Scot Bishop   MR Number: [id number]   YOB: 1980   Date of Visit: 2020       [facility]      NAME:  Scot Bishop             :  1980    MRN: [id number]    CODE STATUS:  Full    FACILITY: [facility] [[id number]]       CHIEF COMPLAIN/REASON FOR VISIT:  Chief Complaint   Patient presents with   ? Problem Visit     increased agitation       HISTORY OF PRESENT ILLNESS: Scot Bishop is a 40 y.o. male being seen as reported that he had choking event over week end and went to ER.N He was returned to TCU, bUT SEEN THIS AM AND HE IS PAST AGITATED THIS AM, HE IS VERY AGGRESSIVE AND BITTING AND HITTING AT STAFF.  He is a TBI. ST has started supervised food feedings, and they will increase food textures. Nursing reports ongoing agitation, and resistive with cares due to his brain injury.  [place] in May 2020. At that juncture he was in cardiac cath lab and suffered a hypoxic brain injury, high temp and thrombotic event. He is seen in his room today. Non verbal and did not track with his eyes. He will make eye contact. He has repetitive movement while in bed.He has repetitive laughing today. He varies between crying and laughing per nursing.  Moving legs and arms. Per nursing he  Slid from bed over week end, no apparent injuries, they are placing a scoop mattress for preventive falls. . He will need ongoing rehab services with PT/OT/ST. Has Gtube due to his dysphagia, which reviewed that weight has been stable. DC to a group home after TCU stay as patient will be unable to care for self, remains dependant on staff for all ADL and mobility as well as GTube for feedings.Wt stable but he needs frequent 1 to 1 when up out of bed .  Nursing reports ongoing  agitation,earlier I saw him and did increase Seroquel however at this juncture he is so aggressive her could harm self or others and will need to go to acute care for more through assessment and testing than can be provided on TCU    . No Known Allergies:     Current Outpatient Medications   Medication Sig   ? acetaminophen (TYLENOL) 650 mg/20.3 mL Soln 650 mg every 6 (six) hours as needed. Via PEG-tube    ? amantadine HCL (SYMMETREL) 50 mg/5 mL solution 100 mg every 12 (twelve) hours. Via PEG-tube   ? aspirin 81 MG EC tablet 81 mg daily. Via PEG-tube   ? atorvastatin (LIPITOR) 40 MG tablet 40 mg at bedtime. Via PEG-tube   ? bromocriptine (PARLODEL) 2.5 mg tablet 2.5 mg 2 (two) times a day. Via PEG-tube   ? chlorhexidine (PERIDEX) 0.12 % solution Apply 15 mL to the mouth or throat 4 (four) times a day.   ? levETIRAcetam (KEPPRA) 100 mg/mL solution 1,000 mg 2 (two) times a day. Via PEG-tube   ? lisinopriL (PRINIVIL,ZESTRIL) 2.5 MG tablet 1.25 mg daily. Via PEG-tube, hold if SBP <90   ? LORazepam (ATIVAN) 0.5 MG tablet 1 tablet (0.5 mg total) by G-tube route 4 (four) times a day for 3 days.   ? metoclopramide (REGLAN) 5 MG tablet 5 mg 2 (two) times a day. Via PEG-tube   ? metoprolol tartrate (LOPRESSOR) 25 MG tablet 6.25 mg 2 (two) times a day. Via PEG-tube, hold if SBP <90, HR <60   ? neomycin-bacitracin-polymyxin B (NEOSPORIN) 3.5-400-5,000 mg-unit-unit OiPk ointment Apply 1 application topically 2 (two) times a day.   ? omeprazole (PRILOSEC) 2 mg/mL SusR suspension 40 mg daily before breakfast. Via PEG-tube   ? pantoprazole (PROTONIX) 40 MG tablet 40 mg daily. Via PEG-tube   ? potassium bicarb-citric acid 20 mEq TbEF 20 mEq daily. Via PEG-tube   ? QUEtiapine (SEROQUEL) 25 MG tablet 25 mg 2 (two) times a day. Via PEG-tube give 25 mg am/pm with 12.5 mg mid day due to increased agitation   ? ticagrelor (BRILINTA) 90 mg Tab 90 mg 2 (two) times a day. Via PEG-tube   ? traZODone (DESYREL) 50 MG tablet 50 mg at bedtime.  Via PEG-tube   ? white petrolatum (AQUAPHOR ORIGINAL) 41 % Oint Apply 1 application topically 2 (two) times a day. Apply to feet/legs         REVIEW OF SYSTEMS:  Unable to verbalize due to aphasia    PHYSICAL EXAMINATION:  Vitals:    11/09/20 1942   BP: 99/50   Pulse: (!) 102   Temp: 99.1  F (37.3  C)         GENERAL: Does not follow simple commands, makes eye contact, non verbal.  HEENT: Head is normocephalic with normal hair distribution. No evidence of trauma. Ears: No acute purulent discharge. Eyes: Conjunctivae pink with no scleral jaundice. Nose: Normal mucosa and septum. NECK: Supple with no cervical or supraclavicular lymphadenopathy. Trachea is midline, healing trach stoma  EXTREMITIES: Atraumatic. Full range of motion on both upper and lower extremities, there is no tenderness to palpation, no pedal edema, no cyanosis or clubbing, no calf tenderness, normal cap refill, no joint swelling.  SKIN: Warm and dry, no erythema noted, abdomen with g tube  NEUROLOGICAL: The patient is oriented to person, TBI    Social Distancing with PPE practiced due to Covid 19    LABS:    Lab Results   Component Value Date    WBC 8.3 08/06/2020    HGB 11.0 (L) 08/06/2020    HCT 34.9 (L) 08/06/2020    MCV 90 08/06/2020     08/06/2020       Results for orders placed or performed in visit on 10/26/20   Basic Metabolic Panel   Result Value Ref Range    Sodium 142 136 - 145 mmol/L    Potassium 4.8 3.5 - 5.0 mmol/L    Chloride 107 98 - 107 mmol/L    CO2 27 22 - 31 mmol/L    Anion Gap, Calculation 8 5 - 18 mmol/L    Glucose 104 70 - 125 mg/dL    Calcium 9.9 8.5 - 10.5 mg/dL    BUN 15 8 - 22 mg/dL    Creatinine 0.82 0.70 - 1.30 mg/dL    GFR MDRD Af Amer >60 >60 mL/min/1.73m2    GFR MDRD Non Af Amer >60 >60 mL/min/1.73m2           No results found for: HGBA1C  No results found for: YMKHWOMA20KX  No results found for: IFRATKNM03    ASSESSMENT/PLAN:  1. Hypoxic brain injury (H)    2. Aggression       Hypoxic Brain injury and  aggression. Staff unable to meet needs n TCU due to increased agitation, EMS called and 4 EMT needed to control him due to agitation and aggression. To ED for eval.         Electronically signed by:  Fouzia High CNP  This progress note was completed using Dragon software and there may be grammatical errors.

## 2021-06-21 NOTE — LETTER
Letter by Fouzia High CNP at      Author: Fouzia High CNP Service: -- Author Type: --    Filed:  Encounter Date: 2020 Status: (Other)         Patient: Scot Bishop   MR Number: [id number]   YOB: 1980   Date of Visit: 2020       [facility]      NAME:  Scot Bishop             :  1980    MRN: [id number]    CODE STATUS: DNR    FACILITY: [facility] [[id number]]       CHIEF COMPLAIN/REASON FOR VISIT:  Chief Complaint   Patient presents with   ? Review Of Multiple Medical Conditions     re admitted from acute care       HISTORY OF PRESENT ILLNESS: Scot Bishop is a 40 y.o. male being seen for review of multiple medical conditions . Nursing reports he is back from the hospital after an extensive 3-week stay, he had aggressive behaviors here on the TCU and was sent out and was followed by psychiatric care at [facility].  He is a TBI and seen in his room this a.m. nonverbal he has a history of aphasia very calm poor eye contact.  [place] [facility] in May 2020. At that juncture he was in cardiac cath lab and suffered a hypoxic brain injury, high temp and thrombotic event. He is seen in his room today. Non verbal and did not track with his eyes. He will make eye contact. He has repetitive movement while in bed.He has repetitive laughing today. He varies between crying and laughing per nursing.  Moving legs and arms. Per nursing he  Slid from bed over week end, no apparent injuries, they are placing a scoop mattress for preventive falls. . He will need ongoing rehab services with PT/OT/ST. Has Gtube due to his dysphagia, which reviewed that weight has been stable.. Unfortunealy due to Scot Hypoxic brain injury he will not be able to understand hygiene education and nursing will need to meet these needs for pt. Therapy continues to work with him, seen ambualting with SBA two.Staff to anticipate needs  and provide good elizabeth care two times a day and prn.   continues to work with family towards DC to a group home after TCU stay as patient will be unable to care for self, remains dependant on staff for all ADL and mobility as well as GTube for feedings, he also has an order for puréed diet level 4 with thick nectar liquids.  However at this juncture we will look at the tube feeding this is more pleasure feedings and if he gets aggressive he could aspirate.    . No Known Allergies:     Current Outpatient Medications   Medication Sig   ? haloperidoL (HALDOL) 5 MG tablet 5 mg by G-tube route 3 (three) times a day.   ? PARoxetine (PAXIL) 20 MG tablet 20 mg by G-tube route every morning.   ? acetaminophen (TYLENOL) 650 mg/20.3 mL Soln 650 mg every 6 (six) hours as needed. Via PEG-tube    ? amantadine HCL (SYMMETREL) 50 mg/5 mL solution 100 mg every 12 (twelve) hours. Via PEG-tube   ? aspirin 81 MG EC tablet 81 mg daily. Via PEG-tube   ? atorvastatin (LIPITOR) 40 MG tablet 40 mg at bedtime. Via PEG-tube   ? lisinopriL (PRINIVIL,ZESTRIL) 2.5 MG tablet 1.25 mg daily. Via PEG-tube, hold if SBP <90   ? LORazepam (ATIVAN) 0.5 MG tablet 1 tablet (0.5 mg total) by G-tube route 4 (four) times a day for 3 days.   ? metoprolol tartrate (LOPRESSOR) 25 MG tablet 6.25 mg 2 (two) times a day. Via PEG-tube, hold if SBP <90, HR <60   ? neomycin-bacitracin-polymyxin B (NEOSPORIN) 3.5-400-5,000 mg-unit-unit OiPk ointment Apply 1 application topically 2 (two) times a day.   ? omeprazole (PRILOSEC) 2 mg/mL SusR suspension 40 mg daily before breakfast. Via PEG-tube   ? potassium bicarb-citric acid 20 mEq TbEF 20 mEq daily. Via PEG-tube   ? QUEtiapine (SEROQUEL) 25 MG tablet 50 mg 3 (three) times a day. Via PEG-tube give 25 mg am/pm with 12.5 mg mid day due to increased agitation   ? ticagrelor (BRILINTA) 90 mg Tab 90 mg 2 (two) times a day. Via PEG-tube   ? traZODone (DESYREL) 50 MG tablet 50 mg at bedtime. Via PEG-tube   ? white  petrolatum (AQUAPHOR ORIGINAL) 41 % Oint Apply 1 application topically 2 (two) times a day. Apply to feet/legs         REVIEW OF SYSTEMS:  Unable to verbalize due to aphasia    PHYSICAL EXAMINATION:  Vitals:    12/01/20 0823   BP: 98/72   Pulse: 100   Temp: 97.1  F (36.2  C)   Weight: 166 lb (75.3 kg)         GENERAL: Does not follow simple commands, makes eye contact, non verbal.  HEENT: Head is normocephalic with normal hair distribution. No evidence of trauma. Ears: No acute purulent discharge. Eyes: Conjunctivae pink with no scleral jaundice. Nose: Normal mucosa and septum. NECK: Supple with no cervical or supraclavicular lymphadenopathy. Trachea is midline, healing trach stoma  Lungs : He is CTA  EXTREMITIES: Atraumatic. Full range of motion on both upper and lower extremities, there is no tenderness to palpation, no pedal edema, no cyanosis or clubbing, no calf tenderness, normal cap refill, no joint swelling.  SKIN: Warm and dry, no erythema noted, abdomen with g tube  NEUROLOGICAL: The patient is oriented to person, TBI    Social Distancing with PPE practiced due to Covid 19    LABS:    Lab Results   Component Value Date    WBC 8.3 08/06/2020    HGB 11.0 (L) 08/06/2020    HCT 34.9 (L) 08/06/2020    MCV 90 08/06/2020     08/06/2020       Results for orders placed or performed in visit on 10/26/20   Basic Metabolic Panel   Result Value Ref Range    Sodium 142 136 - 145 mmol/L    Potassium 4.8 3.5 - 5.0 mmol/L    Chloride 107 98 - 107 mmol/L    CO2 27 22 - 31 mmol/L    Anion Gap, Calculation 8 5 - 18 mmol/L    Glucose 104 70 - 125 mg/dL    Calcium 9.9 8.5 - 10.5 mg/dL    BUN 15 8 - 22 mg/dL    Creatinine 0.82 0.70 - 1.30 mg/dL    GFR MDRD Af Amer >60 >60 mL/min/1.73m2    GFR MDRD Non Af Amer >60 >60 mL/min/1.73m2           No results found for: HGBA1C  No results found for: NFJFFGKX78RI  No results found for: BLZPTPUG88    ASSESSMENT/PLAN:  1. Acute respiratory failure with hypoxia (H)    2. Hypoxic  brain injury (H)    3. Gastrojejunostomy tube status (H)      1/2.Acute resp filure/ Hypoxic Brain injury: Scot is in a semi vegative state, med changes made in acute care is less aggressive. Total dependance on staff for all ADL and med/tube management. Therapy to start assessing for POC while on TCU.     3. Gtube feedings: Will require ongoing feeds enteral, on bolus feedings of Isososorce 1.5, four times a day with free ater flushes. He can also have puree level 4 with mildly thick nectar but would need close supervision.    Post Discharge Medication Reconciliation Status: discharge medications reconciled, continue medications without change     Electronically signed by:  Fouzia High CNP  This progress note was completed using Dragon software and there may be grammatical errors.

## 2021-06-21 NOTE — LETTER
Letter by Fouzia High CNP at      Author: Fouzia High CNP Service: -- Author Type: --    Filed:  Encounter Date: 10/21/2020 Status: (Other)         Patient: Scot Bishop   MR Number: [id number]   YOB: 1980   Date of Visit: 10/21/2020       [facility]      NAME:  Scot Bishop             :  1980    MRN: [id number]    CODE STATUS:  Full    FACILITY: [facility] [[id number]]       CHIEF COMPLAIN/REASON FOR VISIT:  Chief Complaint   Patient presents with   ? Review Of Multiple Medical Conditions     TBI       HISTORY OF PRESENT ILLNESS: Scot Bishop is a 40 y.o. male being seen for review of multiple medical conditions .ST has started supervised soft food feedings, and they will increase food textures. Nursing reports ongoing agitation, and resistive with cares due to his brain injury. .   [place] prior to a NStemi in May 2020. At that juncture he was in cardiac cath lab and suffered a hypoxic brain injury, high temp and thrombotic event. He is seen in his room today. Non verbal and did not track with his eyes. He will make eye contact. He has repetitive movement while in bed.He has repetitive laughing today. He varies between crying and laughing per nursing.  Moving legs and arms. Per nursing he  Slid from bed over week end, no apparent injuries, they are placing a scoop mattress for preventive falls. . He will need ongoing rehab services with PT/OT/ST. Has Gtube due to his dysphagia, which reviewed that weight has been stable.. Unfortunealy due to Scot Hypoxic brain injury he will not be able to understand hygiene education and nursing will need to meet these needs for pt. Therapy continues to work with him, seen ambualting with SBA two.Staff to anticipate needs and provide good elizabeth care two times a day and prn.   continues to work with family towards DC to a group home after TCU stay as patient will be  unable to care for self, remains dependant on staff for all ADL and mobility as well as GTube for feedings.Wt stable but he needs frequent 1 to 1 when up out of bed HAS AGITATION ON SEROQUEL. Discontinue PLANNING ISSUES, NEED dicontinue TO ltc who will accept him.        . No Known Allergies:     Current Outpatient Medications   Medication Sig   ? acetaminophen (TYLENOL) 650 mg/20.3 mL Soln 650 mg every 6 (six) hours as needed. Via PEG-tube    ? amantadine HCL (SYMMETREL) 50 mg/5 mL solution 100 mg every 12 (twelve) hours. Via PEG-tube   ? aspirin 81 MG EC tablet 81 mg daily. Via PEG-tube   ? atorvastatin (LIPITOR) 40 MG tablet 40 mg at bedtime. Via PEG-tube   ? bromocriptine (PARLODEL) 2.5 mg tablet 2.5 mg 2 (two) times a day. Via PEG-tube   ? chlorhexidine (PERIDEX) 0.12 % solution Apply 15 mL to the mouth or throat 4 (four) times a day.   ? levETIRAcetam (KEPPRA) 100 mg/mL solution 1,000 mg 2 (two) times a day. Via PEG-tube   ? lisinopriL (PRINIVIL,ZESTRIL) 2.5 MG tablet 1.25 mg daily. Via PEG-tube, hold if SBP <90   ? LORazepam (ATIVAN) 0.5 MG tablet 1 tablet (0.5 mg total) by G-tube route 4 (four) times a day for 3 days.   ? metoclopramide (REGLAN) 5 MG tablet 5 mg 2 (two) times a day. Via PEG-tube   ? metoprolol tartrate (LOPRESSOR) 25 MG tablet 6.25 mg 2 (two) times a day. Via PEG-tube, hold if SBP <90, HR <60   ? neomycin-bacitracin-polymyxin B (NEOSPORIN) 3.5-400-5,000 mg-unit-unit OiPk ointment Apply 1 application topically 2 (two) times a day.   ? omeprazole (PRILOSEC) 2 mg/mL SusR suspension 40 mg daily before breakfast. Via PEG-tube   ? pantoprazole (PROTONIX) 40 MG tablet 40 mg daily. Via PEG-tube   ? potassium bicarb-citric acid 20 mEq TbEF 20 mEq daily. Via PEG-tube   ? QUEtiapine (SEROQUEL) 25 MG tablet 25 mg 2 (two) times a day. Via PEG-tube give 25 mg am/pm with 12.5 mg mid day due to increased agitation   ? ticagrelor (BRILINTA) 90 mg Tab 90 mg 2 (two) times a day. Via PEG-tube   ? traZODone  (DESYREL) 50 MG tablet 50 mg at bedtime. Via PEG-tube   ? white petrolatum (AQUAPHOR ORIGINAL) 41 % Oint Apply 1 application topically 2 (two) times a day. Apply to feet/legs         REVIEW OF SYSTEMS:  Unable to verbalize due to aphasia    PHYSICAL EXAMINATION:  There were no vitals filed for this visit.      GENERAL: Does not follow simple commands, makes eye contact, non verbal.  HEENT: Head is normocephalic with normal hair distribution. No evidence of trauma. Ears: No acute purulent discharge. Eyes: Conjunctivae pink with no scleral jaundice. Nose: Normal mucosa and septum. NECK: Supple with no cervical or supraclavicular lymphadenopathy. Trachea is midline, healing trach stoma  Lungs : He is CTA  EXTREMITIES: Atraumatic. Full range of motion on both upper and lower extremities, there is no tenderness to palpation, no pedal edema, no cyanosis or clubbing, no calf tenderness, normal cap refill, no joint swelling.  SKIN: Warm and dry, no erythema noted, abdomen with g tube  NEUROLOGICAL: The patient is oriented to person, TBI  Social Distancing with PPE practiced due to Covid 19    LABS:    Lab Results   Component Value Date    WBC 8.3 08/06/2020    HGB 11.0 (L) 08/06/2020    HCT 34.9 (L) 08/06/2020    MCV 90 08/06/2020     08/06/2020       Results for orders placed or performed in visit on 10/05/20   Basic Metabolic Panel   Result Value Ref Range    Sodium 141 136 - 145 mmol/L    Potassium 4.2 3.5 - 5.0 mmol/L    Chloride 104 98 - 107 mmol/L    CO2 26 22 - 31 mmol/L    Anion Gap, Calculation 11 5 - 18 mmol/L    Glucose 106 70 - 125 mg/dL    Calcium 9.7 8.5 - 10.5 mg/dL    BUN 13 8 - 22 mg/dL    Creatinine 0.83 0.70 - 1.30 mg/dL    GFR MDRD Af Amer >60 >60 mL/min/1.73m2    GFR MDRD Non Af Amer >60 >60 mL/min/1.73m2           No results found for: HGBA1C  No results found for: [id number]  No results found for: [id number]    ASSESSMENT/PLAN:  1. G tube feedings (H)    2. Hypoxic brain injury (H)      1..  Dysphagia/Gtube : Has  Started  soft food feedings. Remains on TFing eneteral for nutrition, bolus feedings , all meds via gtube. Nursing will need strict VS and Lung assessment QS and we will stop in the event he does not tolerate well.Lungs clear and no cough.  He continues to do well. ST reports will be starting new texture foods this week,he has been tolerating oral feedings with enterals.    2. Hypoxic Brain injury: Scot has plateau at current level. He has Seroquel due to agitation. Has aggressive behaviors requiring 1 to 1 services at times.     Electronically signed by:  Fouzia High CNP  This progress note was completed using Dragon software and there may be grammatical errors.

## 2021-06-21 NOTE — LETTER
Letter by Cortney Bahena MBBS at      Author: Cortney Bahena MBBS Service: -- Author Type: --    Filed:  Encounter Date: 10/29/2020 Status: (Other)         Patient: Scot Bishop   MR Number: [id number]   YOB: 1980   Date of Visit: 10/29/2020       [facility]  note      Patient: Scot Bishop  MRN: [id number]  Date of Service: 10/29/2020      [facility] [[id number]]  Reason for Visit     Chief Complaint   Patient presents with   ? Review Of Multiple Medical Conditions   Follow-up on /low bp/agitation    Code Status     FULL CODE    Assessment     -Increasing agitation due to which patient is refusing cares  -Persistent hypotension low blood pressures noted-  -Low-grade temperatures of unclear etiology  -Acute VF arrest secondary to acute ST SUKI  -Acute LAD occlusion with underlying history of coronary artery disease  -Hypoxic brain injury  - Severe dysphagia currently discharged on tube feeding; now with staff reporting patient constantly pulling tube feeding out  -Aspiration pneumonia currently resolved  -Acute hypoxic respiratory failure currently resolved  -History of lengthy stay in the hospital with multiple procedures including requiring intubation and ECMO support from 5 17-5 19 with tracheostomy and PEG placement.  -LOWELL currently resolved      Plan     Patient has been admitted to the TCU as a transfer from the hospital where he had a lengthy stay of more than 2 months.  He is on medical management.  Ability to tolerate medications in high doses has been limited because of profound hypotension with autonomic insufficiency.  He is on a low-dose of medications which are frequently held.  However weights have been stable.  Mood and behaviors reviewed he continues to get agitated and refusal of cares has been noted quite often.  Staff reports increasing aggressiveness.  Continue to monitor mood and behaviors   he is on psychotropic medications.  He has started trials  of soft food feeding and noting some improvement.  Start holding tube feeding if he continues to progress.  Discharge planning reviewed with staff and they are looking at long-term care memory unit placement for him          History     Patient is a very pleasant 40 y.o. male who is admitted to TCU  Patient has been admitted after a very lengthy more than 60-day stay in the hospital.  He presented following PEA/VF arrest in the community.  He was admitted and noted to have anterior acute ST SUKI.  He had multiple episodes of cardiac arrest  He required ECMO support and was emergently taken for cardiac catheterization which showed a thrombotic occlusion of the LAD which was treated with aspiration thrombectomy and PCI with drug-eluting stent of the proximal LAD.  He is currently in the TCU.  Unfortunately his ability to tolerate medication is limited due to profound hypotension.  Weights have been stable with no worsening lymphedema he is not verbally expressive aden and cannot communicate hence no evidence that he has had chest pain or discomfort at all    He also suffered from hypoxic brain injury.  Initial CT of the head was negative but EEG shows severe diffuse encephalopathy without seizure or epilepsy.  Repeat CT did show multifocal acute/subacute cerebral infarcts.  And brain injury which is consistent with hypoxic brain injury.  No improvement noted.  He continues to have behavioral dysregulation in spite of being on psychotropic medications.  Staff is reporting that as some degree of insight is returning he is becoming more agitated angry and aggressive with cares.  Today he was making threatening gestures to me and then turned his back on asking any questions.    He remains on tube feeding.  Some improvement noted in swallowing and he will be given trials of food noW   if he succeeds they can cut down on his tube feeding    Blood pressures continue to be labile with lows and highs noted he is on a much lower  dose of medications now.  Unfortunately is not able to tolerate them because of autonomic insufficiency resulting in labile blood pressures          Past Medical History     Active Ambulatory (Non-Hospital) Problems    Diagnosis   ? Agitation   ? Low BP   ? G tube feedings (H)   ? Yeast infection   ? Impetigo any site   ? Hypoxic brain damage (H)   ? Acute respiratory failure with hypoxia (H)   ? Gastrojejunostomy tube status (H)   ? Hypoxic brain injury (H)   ? Dysphagia   ? Cardiac arrest (H)   ? Primary Lymphadenitis   ? Nicotine Dependence   ? Backache     Past Medical History:   Diagnosis Date   ? Acute respiratory failure with hypoxia (H)    ? LOWELL (acute kidney injury) (H)    ? Cardiac arrest (H)    ? Dysphagia    ? HTN (hypertension)    ? Hypoxic ischemic encephalopathy    ? NSTEMI (non-ST elevated myocardial infarction) (H)        Past Social History     Reviewed, and he  reports that he has been smoking. He has never used smokeless tobacco. He reports current alcohol use. He reports that he does not use drugs.    Family History     Reviewed, and family history includes Diabetes in his maternal aunt; Lung cancer in his maternal grandfather; Other in his father.    Medication List   Post Discharge Medication Reconciliation Status: discharge medications reconciled, continue medications without change   Current Outpatient Medications on File Prior to Visit   Medication Sig Dispense Refill   ? acetaminophen (TYLENOL) 650 mg/20.3 mL Soln 650 mg every 6 (six) hours as needed. Via PEG-tube      ? amantadine HCL (SYMMETREL) 50 mg/5 mL solution 100 mg every 12 (twelve) hours. Via PEG-tube     ? aspirin 81 MG EC tablet 81 mg daily. Via PEG-tube     ? atorvastatin (LIPITOR) 40 MG tablet 40 mg at bedtime. Via PEG-tube     ? bromocriptine (PARLODEL) 2.5 mg tablet 2.5 mg 2 (two) times a day. Via PEG-tube     ? chlorhexidine (PERIDEX) 0.12 % solution Apply 15 mL to the mouth or throat 4 (four) times a day.     ?  levETIRAcetam (KEPPRA) 100 mg/mL solution 1,000 mg 2 (two) times a day. Via PEG-tube     ? lisinopriL (PRINIVIL,ZESTRIL) 2.5 MG tablet 1.25 mg daily. Via PEG-tube, hold if SBP <90     ? LORazepam (ATIVAN) 0.5 MG tablet 1 tablet (0.5 mg total) by G-tube route 4 (four) times a day for 3 days. 12 tablet 0   ? metoclopramide (REGLAN) 5 MG tablet 5 mg 2 (two) times a day. Via PEG-tube     ? metoprolol tartrate (LOPRESSOR) 25 MG tablet 6.25 mg 2 (two) times a day. Via PEG-tube, hold if SBP <90, HR <60     ? neomycin-bacitracin-polymyxin B (NEOSPORIN) 3.5-400-5,000 mg-unit-unit OiPk ointment Apply 1 application topically 2 (two) times a day.     ? omeprazole (PRILOSEC) 2 mg/mL SusR suspension 40 mg daily before breakfast. Via PEG-tube     ? pantoprazole (PROTONIX) 40 MG tablet 40 mg daily. Via PEG-tube     ? potassium bicarb-citric acid 20 mEq TbEF 20 mEq daily. Via PEG-tube     ? QUEtiapine (SEROQUEL) 25 MG tablet 25 mg 2 (two) times a day. Via PEG-tube give 25 mg am/pm with 12.5 mg mid day due to increased agitation     ? ticagrelor (BRILINTA) 90 mg Tab 90 mg 2 (two) times a day. Via PEG-tube     ? traZODone (DESYREL) 50 MG tablet 50 mg at bedtime. Via PEG-tube     ? white petrolatum (AQUAPHOR ORIGINAL) 41 % Oint Apply 1 application topically 2 (two) times a day. Apply to feet/legs       No current facility-administered medications on file prior to visit.        Allergies     No Known Allergies    Review of Systems   A comprehensive review of 14 systems was done. Pertinent findings noted here and in history of present illness. All the rest negative.  Constitutional: Negative.  Negative for fever, chills, he has activity change, appetite change and fatigue.   He has now been walking and taking a few steps.  He has been talking also  HENT: Negative for congestion and facial swelling.    Eyes: Negative for photophobia, redness and visual disturbance.   Respiratory: Negative for cough and chest tightness.    Cardiovascular:  Negative for chest pain, palpitations and leg swelling.   Gastrointestinal: Negative for nausea, diarrhea, constipation, blood in stool and abdominal distention.   Genitourinary: Negative.    Musculoskeletal: Negative.  Hard to assess he does move his legs and arms spontaneously but not to command  Skin: Negative.    Neurological: Negative for dizziness, tremors, syncope, weakness, light-headedness and headaches.   Constant scissoring like movement of his legs and arms noted.  Nonresponsive  Hematological: Does not bruise/bleed easily.   Psychiatric/Behavioral: Negative.  Unable to assess as patient does not cooperate  However today noted to be talking spontaneously  Staff reporting that he is not sleeping well.  He has significant anxiety and frequently rolls out of bed  He continues to have more anger and aggression issues especially with staff and frequently makes angry expressions as well as resistance to cares      Physical Exam     Recent Vitals 10/26/2020   Weight 166 lbs   BP 98/60   Pulse 98   Temp 98.5   Some recent data might be hidden   Weight is 168 pounds blood pressure 80/46 temp 99  pulse 105    Constitutional:  appears well-developed.   HEENT:  Normocephalic and atraumatic.  Eyes: Conjunctivae and EOM are normal. Pupils are equal, round, and reactive to light. No discharge.  No scleral icterus. Nose normal. Mouth/Throat: Oropharynx is clear and moist. No oropharyngeal exudate.    NECK: Normal range of motion. Neck supple. No JVD present. No tracheal deviation present. No thyromegaly present.   CARDIOVASCULAR: Normal rate, regular rhythm and intact distal pulses.  Exam reveals no gallop and no friction rub.  Systolic murmur present.  PULMONARY: Effort normal and breath sounds normal. No respiratory distress.No Wheezing or rales.  ABDOMEN: Soft. Bowel sounds are normal. No distension and no mass.  There is no tenderness. There is no rebound and no guarding. No HSM.  Does have a G-tube    MUSCULOSKELETAL: Normal range of motion. No edema and no tenderness. Mild kyphosis, no tenderness.  Hard to assess as patient does not respond to verbal commands or follow them.  He seems to be spontaneously moving his arms and legs though  LYMPH NODES: Has no cervical, supraclavicular, axillary and groin adenopathy.   NEUROLOGICAL: Alert and oriented to NONE. No cranial nerve deficit.  ABNormal muscle tone. Coordination normal.   He does not make any eye contact.  Does not follow verbal commands either  GENITOURINARY: Deferred exam.  SKIN: Skin is warm and dry. No rash noted. No erythema. No pallor.   Excoriation of the skin in the abdomen noted  EXTREMITIES: No cyanosis, no clubbing, no edema. No Deformity.  PSYCHIATRIC: mood, affect and behavior is hard to assess because of noncommunication.  However making angry gestures      Lab Results     Results for orders placed or performed in visit on 10/26/20   Basic Metabolic Panel   Result Value Ref Range    Sodium 142 136 - 145 mmol/L    Potassium 4.8 3.5 - 5.0 mmol/L    Chloride 107 98 - 107 mmol/L    CO2 27 22 - 31 mmol/L    Anion Gap, Calculation 8 5 - 18 mmol/L    Glucose 104 70 - 125 mg/dL    Calcium 9.9 8.5 - 10.5 mg/dL    BUN 15 8 - 22 mg/dL    Creatinine 0.82 0.70 - 1.30 mg/dL    GFR MDRD Af Amer >60 >60 mL/min/1.73m2    GFR MDRD Non Af Amer >60 >60 mL/min/1.73m2             DARIO Vargas

## 2021-06-21 NOTE — LETTER
Letter by Fouzia High CNP at      Author: Fouzia High CNP Service: -- Author Type: --    Filed:  Encounter Date: 2020 Status: (Other)         Patient: Scot Bishop   MR Number: [id number]   YOB: 1980   Date of Visit: 2020       [facility]      NAME:  Scot Bishop             :  1980    MRN: [id number]    CODE STATUS: DNR    FACILITY: [facility] [[id number]]       CHIEF COMPLAIN/REASON FOR VISIT:  Chief Complaint   Patient presents with   ? Review Of Multiple Medical Conditions     med reducation       HISTORY OF PRESENT ILLNESS: Scot Bishop is a 40 y.o. male being seen per today for review of multiple medical conditions. . Nursing reports he is back from the hospital after an extensive 3-week stay, he had aggressive behaviors here on the TCU and was sent out and was followed by psychiatric care at [facility].  He is a TBI and seen in his room this a.m. nonverbal he has a history of aphasia very calm poor eye contact.  [place]emi in May 2020. At that juncture he was in cardiac cath lab and suffered a hypoxic brain injury, high temp and thrombotic event. He is seen in his room today. Non verbal and did not track with his eyes. He will make eye contact. H. He will need ongoing rehab services with PT/OT/ST. Has Gtube due to his dysphagia,  Unfortunealy due to Scot Hypoxic brain injury he will not be able to understand hygiene education and nursing will need to meet these needs for pt. Therapy continues to work with him, seen ambualting with SBA two.Staff to anticipate needs and provide good elizabeth care two times a day and prn.   continues to work with family towards DC to a group home after TCU stay as patient will be unable to care for self, remains dependant on staff for all ADL and mobility as well as GTube for feedings, he also has an order for puréed diet level 4 with  thick nectar liquids.  However at this juncture we will look at the tube feeding this is more pleasure feedings and if he gets aggressive he could aspirate. Pt did get a work up r/t temp with abnormal labs, he is pallative care and recently had depakote reduced. Remains with some lethargy.      . No Known Allergies:     Current Outpatient Medications   Medication Sig   ? acetaminophen (TYLENOL) 650 mg/20.3 mL Soln 650 mg every 6 (six) hours as needed. Via PEG-tube    ? amantadine HCL (SYMMETREL) 50 mg/5 mL solution 100 mg every 12 (twelve) hours. Via PEG-tube   ? aspirin 81 MG EC tablet 81 mg daily. Via PEG-tube   ? atorvastatin (LIPITOR) 40 MG tablet 40 mg at bedtime. Via PEG-tube   ? haloperidoL (HALDOL) 5 MG tablet 5 mg by G-tube route 3 (three) times a day.   ? lisinopriL (PRINIVIL,ZESTRIL) 2.5 MG tablet 1.25 mg daily. Via PEG-tube, hold if SBP <90   ? LORazepam (ATIVAN) 0.5 MG tablet 1 tablet (0.5 mg total) by G-tube route 4 (four) times a day for 3 days.   ? metoprolol tartrate (LOPRESSOR) 25 MG tablet 6.25 mg 2 (two) times a day. Via PEG-tube, hold if SBP <90, HR <60   ? neomycin-bacitracin-polymyxin B (NEOSPORIN) 3.5-400-5,000 mg-unit-unit OiPk ointment Apply 1 application topically 2 (two) times a day.   ? omeprazole (PRILOSEC) 2 mg/mL SusR suspension 40 mg daily before breakfast. Via PEG-tube   ? PARoxetine (PAXIL) 20 MG tablet 20 mg by G-tube route every morning.   ? QUEtiapine (SEROQUEL) 25 MG tablet 50 mg 3 (three) times a day. Via PEG-tube give 25 mg am/pm with 12.5 mg mid day due to increased agitation   ? ticagrelor (BRILINTA) 90 mg Tab 90 mg 2 (two) times a day. Via PEG-tube   ? traZODone (DESYREL) 50 MG tablet 50 mg at bedtime. Via PEG-tube   ? white petrolatum (AQUAPHOR ORIGINAL) 41 % Oint Apply 1 application topically 2 (two) times a day. Apply to feet/legs         REVIEW OF SYSTEMS:  Unable to verbalize due to aphasia    PHYSICAL EXAMINATION:  Vitals:    12/15/20 0657   BP: 101/68   Pulse: (!)  108   Temp: 98  F (36.7  C)   Weight: 157 lb 3.2 oz (71.3 kg)         GENERAL: Does not follow simple commands, makes eye contact, non verbal.  HEENT: Head is normocephalic with normal hair distribution. No evidence of trauma.Oral: reddened wth white coating on tongue left inner cheek Ears: No acute purulent discharge. Eyes: Conjunctivae pink with no scleral jaundice. Nose: Normal mucosa and septum. NECK: Supple with no cervical or supraclavicular lymphadenopathy. Trachea is midline, healing trach stomaLungs : He is CTA  EXTREMITIES: Atraumatic. Full range of motion on both upper and lower extremities, there is no tenderness to palpation, no pedal edema, no cyanosis or clubbing, no calf tenderness, normal cap refill, no joint swelling.  SKIN: Warm and dry, no erythema noted, abdomen with g tube  NEUROLOGICAL: The patient is oriented to person, TBI    Social Distancing with PPE practiced due to Covid 19    LABS:    Lab Results   Component Value Date    WBC 10.6 12/08/2020    HGB 13.7 (L) 12/08/2020    HCT 43.8 12/08/2020    MCV 94 12/08/2020     12/08/2020       Results for orders placed or performed in visit on 12/08/20   Basic Metabolic Panel   Result Value Ref Range    Sodium 149 (H) 136 - 145 mmol/L    Potassium 3.8 3.5 - 5.0 mmol/L    Chloride 112 (H) 98 - 107 mmol/L    CO2 25 22 - 31 mmol/L    Anion Gap, Calculation 12 5 - 18 mmol/L    Glucose 137 (H) 70 - 125 mg/dL    Calcium 8.8 8.5 - 10.5 mg/dL    BUN 23 (H) 8 - 22 mg/dL    Creatinine 0.74 0.70 - 1.30 mg/dL    GFR MDRD Af Amer >60 >60 mL/min/1.73m2    GFR MDRD Non Af Amer >60 >60 mL/min/1.73m2           No results found for: HGBA1C  No results found for: [id number]  No results found for: [id number]    ASSESSMENT/PLAN:  1. Hypoxic brain injury (H)    2. Dysphagia, unspecified type        1.. Hypoxic Brain injury: Scot is in a semi vegative state, med changes made in acute care and  is less aggressive now. Total dependance on staff for all ADL and  med/tube management.  Pallative consult with [facility] in place. Rounding MD did decrease his Depakote but remains calm and minimally responsive.    2.Dysphagia: Pt will continue on TF as ordered with flushes as well as all meds via g tube. He can have a pureed diet but is more lethargic and needs calories via enteral feed.     Electronically signed by:  Fouzia High CNP  This progress note was completed using Dragon software and there may be grammatical errors.

## 2021-06-21 NOTE — LETTER
Letter by Cortney Bahena MBBS at      Author: Cortney Bahena MBBS Service: -- Author Type: --    Filed:  Encounter Date: 2/2/2021 Status: (Other)         [facility]  [address] [address]                                  February 2, 2021    Patient: Scot Bishop   MR Number: [id number]   YOB: 1980   Date of Visit: 2/2/2021     Dear Dr. Gan:    Thank you for referring Scot Bishop to me for evaluation. Below are the relevant portions of my assessment and plan of care.    If you have questions, please do not hesitate to call me. I look forward to following Scot along with you.    Sincerely,        DARIO Vargas          CC  No Recipients  Cortney Bahena MBBS  2/2/2021  1:46 PM  [facility] Admission note      Patient: Scot Bishop  MRN: [id number]        [facility] [[id number]]  Reason for Visit     Chief Complaint   Patient presents with   ? Review Of Multiple Medical Conditions   Evaluated for review of his psychotropic medications1    Code Status     dnr /comfort foccused    Assessment     - s/p anoxic brain injury   - Dysphagia on TF  -- long hospitalization with agitated /aggressive behavoir  Patient on multiple psychotropic meds.  -Hypotension.  - FTT    Plan     Patient has been readmitted to the TCU  At baseline no cognitive impairment  He remains wheelchair-bound but has now been getting more impulsive and trying to stand out  He is hard to redirect when he is impulsive.  Psychotropic medication regimen is working for him.  GJ tube feeding is going well and his weights have been stable  Recheck electrolytes and kidney functions have been stable also.  Discharge planning reviewed with  and planning with family to discharge him to a group home this week.  All discharge papers were signed for that  He will require long-term placement      History     Patient is a very pleasant 40 y.o. male who is  readmitted to TCU  Pt  Is admitted to behavioral health unit due to aggressive behaviors; with hitting staff and difficulty to care  Various interventions were tried and were unsuccessful and eventually patient was started on psychotropic meds.  Eventually his behaviors escalated and he had been sent to the behavioral health unit he comes back on high doses of medication including Seroquel trazodone.  Recently Depakote was reduced to 3 times a day with improvement noted.  Today was sitting in a Broda chair.  Unfortunately now he is reporting as per staff some improved movement and has occasionally been even noted to be is trying to stand up  Unfortunately he is a high fall risk  He is tolerating his medication with no sedation concerns  He continues to be nonverbal with no improvement in his mood and behaviors.  Appears to be in a permanent vegetative state.  Unfortunately he is hard to redirect and gets combative very quickly  Staff feels he is tolerating his psych meds well and are afraid of his behaviors escalating up they will discontinued the recommended continuing his current medication regimen with no changes  He recently had pulled his G-tube out  Since then he has tolerated his tube feeding well  This was replaced.  At baseline nonambulatory and requires a Broda chair he is an assist of all cares.  In addition he has chronically low blood pressures which are felt to be secondary to his autonomic instability.  Blood pressures continue to fluctuate widely  He continues to require tube feeding.  At present he is not able to self initiate eating.  He also has low blood pressures with intermittent low-grade temperatures noted work-up in the past has been negative including extensive imaging.  It is felt secondary to autonomic instability  His father is working on discharge planning to his own home staff is helping him get adaptive equipment to make the discharge plan successful  Blood pressures and vitals remain  labile weights have been stable recently  He is looking at discharging to an assisted living facility/group home this week staff is requesting for some paperwork they have requested a Covid test to be negative before they will accept him    Past Medical History     Active Ambulatory (Non-Hospital) Problems    Diagnosis   ? Sedated   ? Thrush, oral   ? Fever   ? Aggression   ? Agitation   ? Low BP   ? G tube feedings (H)   ? Yeast infection   ? Impetigo any site   ? Hypoxic brain damage (H)   ? Acute respiratory failure with hypoxia (H)   ? Gastrojejunostomy tube status (H)   ? Hypoxic brain injury (H)   ? Dysphagia   ? Cardiac arrest (H)   ? Primary Lymphadenitis   ? Nicotine Dependence   ? Backache     Past Medical History:   Diagnosis Date   ? Acute respiratory failure with hypoxia (H)    ? Aggression 11/09/2020   ? Agitation 09/21/2020   ? LOWELL (acute kidney injury) (H)    ? Back injury, sequela 10/27/2017   ? Cardiac arrest (H)    ? Cardiac arrest (H) 05/17/2020   ? Cognitive disorder 11/11/2020   ? Dysphagia    ? Dysphagia 11/11/2020   ? Gastrojejunostomy tube status (H) 11/11/2020   ? HTN (hypertension)    ? Hypoxic ischemic encephalopathy    ? Low blood pressure 09/17/2020   ? Nonverbal 11/11/2020   ? NSTEMI (non-ST elevated myocardial infarction) (H)    ? TBI (traumatic brain injury) (H) 06/25/2020       Past Social History     Reviewed, and he  reports that he has been smoking. He has been smoking about 1.00 pack per day. He has never used smokeless tobacco. He reports current alcohol use. He reports that he does not use drugs.    Family History     Reviewed, and family history includes Cancer in his maternal grandfather; Diabetes in his maternal aunt; Diabetes type I in his maternal aunt; Lung cancer in his maternal grandfather; Other in his father.    Medication List   Post Discharge Medication Reconciliation Status:    QUEtiapine (SEROQUEL) 50 mg tablet    Indications: Aggression Administer 1 tablet via  G-tube 3 times daily.   0 11/27/2020     haloperidoL (HALDOL) 5 mg tablet    Indications: Aggression Administer 1 tablet via G-tube 3 times daily.   0 11/27/2020     divalproex sprinkles (DEPAKOTE SPRINKLES) 125 mg capsule    Indications: Aggression 4 capsules 2 times daily. G tube   0 11/27/2020     traZODone (DESYREL) 50 mg tablet    Indications: Aggression Administer 1 tablet via G-tube at bedtime.   0 11/27/2020     potassium chloride (K-APOLLO) 20 mEq packet    Indications: Dehydration 1 Packet once daily with a meal. G-tube   0 11/27/2020     omeprazole (PRILOSEC) 2 mg/mL susp oral suspension    Indications: Dysphagia, unspecified type Administer 20 mL via G-tube once daily.   0 11/27/2020     metoprolol tartrate (LOPRESSOR) 25 mg tablet    Indications: Cardiac arrest (HC) Administer 0.25 tablets via G-tube 2 times daily.   0 11/27/2020     acetaminophen (TYLENOL) 325 mg tablet    Indications: History of anoxic brain injury Administer 2 tablets via G-tube every 6 hours if needed. Max acetaminophen dose: 4000mg in 24 hrs.    0 11/27/2020     ticagrelor (BRILINTA) 90 mg tablet    Indications: Cardiac arrest (HC) Administer 1 tablet via nasogastric tube 2 times daily.   0 11/27/2020     PARoxetine (PAXIL) 20 mg tablet    Indications: Aggression Administer 1 tablet via G-tube once daily.   0 11/27/2020     atorvastatin (LIPITOR) 40 mg tablet    Indications: Cardiac arrest (HC) Administer 1 tablet via G-tube at bedtime.   0 11/27/2020     aspirin chewable 81 mg chewable tablet    Indications: Cardiac arrest (HC) Administer 1 tablet via G-tube once daily.   0 11/27/2020     amantadine HCL (SYMMETREL) 50 mg/5 mL solution    Indications: History of anoxic brain injury Administer 10 mL via G-tube every 12              Allergies     No Known Allergies    Review of Systems   A comprehensive review of 14 systems was done. Pertinent findings noted here and in history of present illness. All the rest negative.  Constitutional:  Negative.  Negative for fever, chills, he has activity change, appetite change and fatigue.   HENT: Negative for congestion and facial swelling.    Eyes: Negative for photophobia, redness and visual disturbance.   Respiratory: Negative for cough and chest tightness.    Cardiovascular: Negative for chest pain, palpitations and leg swelling.   Gastrointestinal: Negative for nausea, diarrhea, constipation, blood in stool and abdominal distention.   Has incontinence  Genitourinary: Negative.    Musculoskeletal: Negative.  Remains WC bound  Skin: Negative.    Neurological: Negative for dizziness, tremors, syncope, weakness, light-headedness and headaches.   Hematological: Does not bruise/bleed easily.   Psychiatric/Behavioral: Negative.  Noted to be more awake per staff      Physical Exam     Recent Vitals 2/2/2021   Weight 153 lbs   /64   Pulse 74   Temp 98.3   Temp src -   SpO2 96   Some recent data might be hidden       Constitutional: Oriented to none and appears well-developed.   HEENT:  Normocephalic and atraumatic.  Eyes: Conjunctivae and EOM are normal. Pupils are equal, round, and reactive to light. No discharge.  No scleral icterus. Nose normal. Mouth/Throat: Oropharynx is clear and moist. No oropharyngeal exudate.    NECK: Normal range of motion. Neck supple. No JVD present. No tracheal deviation present. No thyromegaly present.   CARDIOVASCULAR: Normal rate, regular rhythm and intact distal pulses.  Exam reveals no gallop and no friction rub.  Systolic murmur present.  PULMONARY: Effort normal and breath sounds normal. No respiratory distress.No Wheezing or rales.  ABDOMEN: Soft. Bowel sounds are normal. No distension and no mass.  There is no tenderness. There is no rebound and no guarding. No HSM.  Peg tube present  Perianal skin intact ; slight excoriation  MUSCULOSKELETAL: Normal range of motion. No edema and no tenderness. Mild kyphosis, no tenderness.  LYMPH NODES: Has no cervical,  supraclavicular, axillary and groin adenopathy.   NEUROLOGICAL: Alert and oriented to none. No cranial nerve deficit.  Normal muscle tone. Coordination normal.   aphasic  GENITOURINARY: Deferred exam.  SKIN: Skin is warm and dry. No rash noted. No erythema. No pallor.   EXTREMITIES: No cyanosis, no clubbing, no edema. No Deformity.  PSYCHIATRIC:abNormal mood, affect and behavior. poorly responsive but sitting with his eyes open      Lab Results     Recent Results (from the past 240 hour(s))   COVID-19 Virus PCR MRF    Specimen: Respiratory   Result Value Ref Range    COVID-19 VIRUS SPECIMEN SOURCE [facility]     2019-nCOV Not Detected    COVID-19 Virus PCR MRF    Specimen: Respiratory   Result Value Ref Range    COVID-19 VIRUS SPECIMEN SOURCE [facility]     2019-nCOV       Test received-See reflex to IDDL test SARS CoV2 (COVID-19) Virus RT-PCR   SARS-CoV-2 (COVID-19)-PCR    Specimen: Respiratory   Result Value Ref Range    SARS-CoV-2 Virus Specimen Source [facility]     SARS-CoV-2 PCR Result NEGATIVE     SARS-COV-2 PCR COMMENT (Note)            DARIO Vargas

## 2021-06-21 NOTE — LETTER
Letter by Fouzia High CNP at      Author: Fouzia High CNP Service: -- Author Type: --    Filed:  Encounter Date: 2020 Status: (Other)         Patient: Scot Bishop   MR Number: [id number]   YOB: 1980   Date of Visit: 2020       [facility]      NAME:  Scot Bishop             :  1980    MRN: [id number]    CODE STATUS: DNR    FACILITY: [facility] [[id number]]       CHIEF COMPLAIN/REASON FOR VISIT:  Chief Complaint   Patient presents with   ? Problem Visit     temp       HISTORY OF PRESENT ILLNESS: Scot Bishop is a 40 y.o. male being seen per nursing request as temp is 100.8 this am.  Nursing reports he is back from the hospital after an extensive 3-week stay, he had aggressive behaviors here on the TCU and was sent out and was followed by psychiatric care at [facility].  He is a TBI and seen in his room this a.m. nonverbal he has a history of aphasia very calm poor eye contact.  [place] [facility] [place] [facility] in May 2020. At that juncture he was in cardiac cath lab and suffered a hypoxic brain injury, high temp and thrombotic event. He is seen in his room today. Non verbal and did not track with his eyes. He will make eye contact. He has repetitive movement while in bed.He has repetitive laughing today. He varies between crying and laughing per nursing.  Moving legs and arms. Per nursing he  Slid from bed over week end, no apparent injuries, they are placing a scoop mattress for preventive falls. . He will need ongoing rehab services with PT/OT/ST. Has Gtube due to his dysphagia, which reviewed that weight has been stable.. Unfortunealy due to Scot Hypoxic brain injury he will not be able to understand hygiene education and nursing will need to meet these needs for pt. Therapy continues to work with him, seen ambualting with SBA two.Staff to anticipate needs and provide good elizabeth care two times a day and  prn.   continues to work with family towards DC to a group home after TCU stay as patient will be unable to care for self, remains dependant on staff for all ADL and mobility as well as GTube for feedings, he also has an order for puréed diet level 4 with thick nectar liquids.  However at this juncture we will look at the tube feeding this is more pleasure feedings and if he gets aggressive he could aspirate. Pt did get a work up r/t temp with abnormal labs, he is pallative care and we will obtain a ua this am.    . No Known Allergies:     Current Outpatient Medications   Medication Sig   ? acetaminophen (TYLENOL) 650 mg/20.3 mL Soln 650 mg every 6 (six) hours as needed. Via PEG-tube    ? amantadine HCL (SYMMETREL) 50 mg/5 mL solution 100 mg every 12 (twelve) hours. Via PEG-tube   ? aspirin 81 MG EC tablet 81 mg daily. Via PEG-tube   ? atorvastatin (LIPITOR) 40 MG tablet 40 mg at bedtime. Via PEG-tube   ? haloperidoL (HALDOL) 5 MG tablet 5 mg by G-tube route 3 (three) times a day.   ? lisinopriL (PRINIVIL,ZESTRIL) 2.5 MG tablet 1.25 mg daily. Via PEG-tube, hold if SBP <90   ? LORazepam (ATIVAN) 0.5 MG tablet 1 tablet (0.5 mg total) by G-tube route 4 (four) times a day for 3 days.   ? metoprolol tartrate (LOPRESSOR) 25 MG tablet 6.25 mg 2 (two) times a day. Via PEG-tube, hold if SBP <90, HR <60   ? neomycin-bacitracin-polymyxin B (NEOSPORIN) 3.5-400-5,000 mg-unit-unit OiPk ointment Apply 1 application topically 2 (two) times a day.   ? omeprazole (PRILOSEC) 2 mg/mL SusR suspension 40 mg daily before breakfast. Via PEG-tube   ? PARoxetine (PAXIL) 20 MG tablet 20 mg by G-tube route every morning.   ? QUEtiapine (SEROQUEL) 25 MG tablet 50 mg 3 (three) times a day. Via PEG-tube give 25 mg am/pm with 12.5 mg mid day due to increased agitation   ? ticagrelor (BRILINTA) 90 mg Tab 90 mg 2 (two) times a day. Via PEG-tube   ? traZODone (DESYREL) 50 MG tablet 50 mg at bedtime. Via PEG-tube   ? white petrolatum  (AQUAPHOR ORIGINAL) 41 % Oint Apply 1 application topically 2 (two) times a day. Apply to feet/legs         REVIEW OF SYSTEMS:  Unable to verbalize due to aphasia    PHYSICAL EXAMINATION:  There were no vitals filed for this visit.      GENERAL: Does not follow simple commands, makes eye contact, non verbal.  HEENT: Head is normocephalic with normal hair distribution. No evidence of trauma. Ears: No acute purulent discharge. Eyes: Conjunctivae pink with no scleral jaundice. Nose: Normal mucosa and septum. NECK: Supple with no cervical or supraclavicular lymphadenopathy. Trachea is midline, healing trach stoma  Lungs : He is CTA  EXTREMITIES: Atraumatic. Full range of motion on both upper and lower extremities, there is no tenderness to palpation, no pedal edema, no cyanosis or clubbing, no calf tenderness, normal cap refill, no joint swelling.  SKIN: Warm and dry, no erythema noted, abdomen with g tube  NEUROLOGICAL: The patient is oriented to person, TBI    Social Distancing with PPE practiced due to Covid 19    LABS:    Lab Results   Component Value Date    WBC 17.0 (H) 12/01/2020    HGB 15.9 12/01/2020    HCT 52.2 12/01/2020    MCV 95 12/01/2020     12/01/2020       Results for orders placed or performed in visit on 12/01/20   Basic Metabolic Panel   Result Value Ref Range    Sodium 149 (H) 136 - 145 mmol/L    Potassium 5.6 (H) 3.5 - 5.0 mmol/L    Chloride 111 (H) 98 - 107 mmol/L    CO2 14 (L) 22 - 31 mmol/L    Anion Gap, Calculation 24 (H) 5 - 18 mmol/L    Glucose 101 70 - 125 mg/dL    Calcium 10.8 (H) 8.5 - 10.5 mg/dL    BUN 38 (H) 8 - 22 mg/dL    Creatinine 1.11 0.70 - 1.30 mg/dL    GFR MDRD Af Amer >60 >60 mL/min/1.73m2    GFR MDRD Non Af Amer >60 >60 mL/min/1.73m2           No results found for: HGBA1C  No results found for: [id number]  No results found for: LBFTGKQA56    ASSESSMENT/PLAN:  1. Hypoxic brain injury (H)    2. Fever, unspecified fever cause      1. Hypoxic Brain injury: Scot is in a  semi vegative state, med changes made in acute care is less aggressive. Total dependance on staff for all ADL and med/tube management. Therapy to start assessing for POC while on TCU. Pallative consult with [facility] in place    2. Fever: May be brain injury r/t due to brain injury. Did have some dill on 12/1 with rounding MD but we will check a UA as well.         Electronically signed by:  Fouzia High CNP  This progress note was completed using Dragon software and there may be grammatical errors.

## 2021-06-21 NOTE — LETTER
Letter by Fouzia High CNP at      Author: Fouzia High CNP Service: -- Author Type: --    Filed:  Encounter Date: 2021 Status: (Other)         Patient: Scot Bishop   MR Number: [id number]   YOB: 1980   Date of Visit: 2021     [facility]      NAME:  Scot Bishop             :  1980  MRN: [id number]  CODE STATUS:  DNR/DNI    VISIT TYPE: DISCHARGE SUMMARY  FACILYTY: [facility] [[id number]]                    PRIMARY CARE PROVIDER: Cachorro Morales MD    DISCHARGE DIAGNOSIS:      1. Acute respiratory failure with hypoxia (H)    2. Cardiac arrest (H)    3. Hypoxic brain damage (H)    4. Hypoxic brain injury (H)    5. Dysphagia, unspecified type         DISCHARGE MEDICATIONS:         Medication List          Accurate as of 2021  7:33 PM. If you have any questions, ask your nurse or doctor.            CONTINUE taking these medications    acetaminophen 650 mg/20.3 mL Soln  Commonly known as: TYLENOL     amantadine HCL 50 mg/5 mL solution  Commonly known as: SYMMETREL     Aquaphor OriginaL 41 % Oint  Generic drug: white petrolatum     aspirin 81 MG EC tablet     atorvastatin 40 MG tablet  Commonly known as: LIPITOR     haloperidoL 5 MG tablet  Commonly known as: HALDOL     ISOSOURCE 1.5 TEN ENTERAL TUBE     LORazepam 0.5 MG tablet  Commonly known as: ATIVAN  1 tablet (0.5 mg total) by G-tube route 4 (four) times a day for 3 days.     metoprolol tartrate 25 MG tablet  Commonly known as: LOPRESSOR     nystatin ointment  Commonly known as: MYCOSTATIN     omeprazole 2 mg/mL Susr suspension  Commonly known as: PriLOSEC     PARoxetine 20 MG tablet  Commonly known as: PAXIL     QUEtiapine 25 MG tablet  Commonly known as: SEROquel     ticagrelor 90 mg Tab  Commonly known as: BRILINTA     traZODone 50 MG tablet  Commonly known as: DESYREL     valproate 250 mg/5 mL  Commonly known as: DEPAKENE        STOP taking these medications     lisinopriL 2.5 MG tablet  Commonly known as: PRINIVIL,ZESTRIL  Stopped by: Fouzia High CNP     neomycin-bacitracin-polymyxin B 3.5-400-5,000 mg-unit-unit Oipk ointment  Commonly known as: NEOSPORIN  Stopped by: Fouzia High CNP            HISTORY OF PRESENT ILLNESS: Scot Bishop is a 40 y.o. male being seen today for a face to face visit for an anticipated discontinue home on 2/4/21 to a group home. Patient comes from [facility] after  a NStemi in May 2020. At that juncture he was in cardiac cath lab and suffered a hypoxic brain injury, high temp and thrombotic event. He is seen in his room today. Non verbal and did not track with his eyes. He will make eye contact. He will need ongoing rehab services with PT/OT/ST. Has Gtube due to his dysphagia,   due to Scot Hypoxic brain injury he will not be able to understand hygiene education and nursing will need to meet these needs for pt. Scot also went to [facility] 12/20 to 1/8 due to agitation and needing med adjustments. This was completed and relatively stable behaviors since back to TCU.  .Staff to anticipate needs and provide good elizabeth care two times a day and prn.   continues to work with family towards DC to a group home after TCU stay as patient will be unable to care for self, remains dependant on staff for all ADL and mobility as well as GTube for feedings. He will have HHA services for PT/OT/ST.    SKILLED NURSING FACILITY COURSE:  During this TCU stay, patient completed all anticipated goals of therapy.      PHYSICAL EXAMINATION:    Vitals:    02/02/21 1642   BP: 112/64   Pulse: 74   Temp: 98.3  F (36.8  C)   Weight: 153 lb (69.4 kg)         GENERAL: Awake, Alert, oriented to self only, not in any form of acute distress, non conversant  HEENT: Head is normocephalic with normal hair distribution. No evidence of trauma. Ears: No acute purulent discharge. Eyes: Conjunctivae pink with no scleral jaundice. Nose: Normal mucosa and septum.  NECK: Supple with no cervical or supraclavicular lymphadenopathy. Trachea is midline.   ABDOMEN: Globular and soft, nontender to palpation, non distended, no masses, no organomegaly, good bowel sounds, no rebound or guarding, no peritoneal signs. Gtube in place  EXTREMITIES: Non purposeful movement, able to bear wt.  SKIN: Warm and dry, no erythema noted, no rashes or lesions.  NEUROLOGICAL: The patient is oriented to person,TBI, Aphasic       LABS:  All labs reviewed in the nursing home record.        DISCHARGE PLAN:   The Patient is homebound due to: Brain injury with dysphagia and it is , taxing and it will take a considerable amount of effort for patient to leave the home.  Hhe is dependent on others for transportation.  The patient is confined to his home and needs intermittent skilled nursing, PT,OT, RN, SW, and HHA.    Patient to be followed by home care for physical therapy to eval and treat for strengthening, balance, endurance, and safety with mobility, and ambulation.  Patient to be followed by home care for occupational therapy to eval and treat for strengthening, ADL needs, adaptive equipment, and safety.  Patient to be followed by home care for nursing services for medication set up and teaching, symptom and disease processes monitoring and education.    Patient to be followed by home care for home health aid services for bathing and ADL needs.  Planned discharge.  All therapy goals have been met.  Family will assist with discharge and transportation.        Patient will follow up with PCP within 7- days after discharge for medication mangagment and appropriate lab studies.    Per past ordered equipmet      Electronically signed by:  Fouzia High CNP  This progress note was completed using Dragon software and there may be grammatical errors.      For documentation purposes, chart review, medication management, and discharge coordination of care was greater than 35 minutes

## 2021-06-21 NOTE — PROGRESS NOTES
Video-Visit Details    Type of service:  Video Visit    Video Start Time (time video started): 11:07    Video End Time (time video stopped): 11:43    Originating Location (pt. Location): Other [facility]     Distant Location (provider location):  [facility] NUTRITION SERVICES     Mode of Communication:  Video Conference via [facility]    OUTPATIENT NUTRITION ASSESSMENT (VIDEO VISIT DUE TO COVID-19)   REASON FOR ASSESSMENT  Scot Bishop referred by Dr. Morales for MNT related to   Traumatic brain injury with loss of consciousness, sequela (H) [S06.9X9S]  - Primary       G tube feedings (H) [Z93.1]         Patient accompanied by Gerald, father, Audrey, step mother, Priyanka, staff member, 7 year old son.     ASSESSMENT   Nutrition History:  - Information obtained from father, step mom and staff worker.  Patient's step mom describes patient's eating habits as limited food choices with hamburgers and pizza but now eats all foods provided to him.  Patient's father states that when they feed him, patient will eat most of his meal.  Priyanka, staff worker, states patient eats 45-50% of meals.  Patient currently living at [facility] in [place].  Patient has 7 year old son.     Patient's father states patient's  lbs.  Patient is on a regular diet with thin liquids.  Patient is a total feed.    Patient has regular formed bowel movements either daily or every other day.      Diet Recall:  Breakfast: oatmeal   Lunch: 45-50%-turkey sandwich pudding/applesauce + fruit (fresh fruit-chevy or canned fruit)   Dinner: spaghetti  Snack: cookie or pudding   Beverages: water    Exercise: Patient working with PT and OT.     NUTRITION FOCUSED PHYSICAL ASSESSMENT (NFPA) FOR DIAGNOSING MALNUTRITION  Yes         Observed:   No nutrition-related physical findings observed    Obtained from Chart/Interdisciplinary Team:  Non-healing wound near G tube     LABS  Labs  "reviewed    MEDICATIONS/SUPPLEMENTS  Medications/supplements reviewed-no MVI     ANTHROPOMETRICS   Height: 5'8\"  Weight: 153 lbs (2/2/2021) 239 lbs (2/19/2021) weighed in wheelchair; patient has not been weighed in 4 months  BMI (kg/m2): 23.2 kg/m2  Weight Status:  Normal BMI  %IBW: 99%  Weight History:   Wt Readings from Last 10 Encounters:   02/19/21 108.4 kg (239 lb)   02/02/21 69.4 kg (153 lb)   02/02/21 69.4 kg (153 lb)   01/26/21 69.9 kg (154 lb 3.2 oz)   01/21/21 67.3 kg (148 lb 6.4 oz)   01/19/21 68.6 kg (151 lb 3.2 oz)   01/19/21 54.9 kg (121 lb)   01/13/21 69 kg (152 lb 3.2 oz)   12/31/20 70.1 kg (154 lb 9.6 oz)   12/23/20 71.3 kg (157 lb 3.2 oz)     ASSESSED NUTRITION NEEDS  Estimated Energy Needs: 7479-4899 kcals/day (25-30 Kcal/Kg)  Justification:  (maintenance)  Estimated Protein Needs:  grams protein/day (1.2-1.5 g pro/Kg)  Justification:  (preservation of lean body mass)  Estimated Fluid Needs: 1123-2355 mL/day (30-35 mL/kg)    NUTRITION SUPPORT ENTERAL:  Type of Feeding Tube: G tube   Enteral Frequency:  Cyclic  Enteral Regimen: Isosource 1.5   Total Enteral Provisions: 140 mL x 11 hours  (8PM-7 AM) = 2310 kcals (33 kcals/kg) 105 gm Pro (1.5 gm/kg) 23 gm fiber 1170 mL free fluid   Free Water Flush: 180-240 mL water flush 4 times per day (720-960 mL)  Total fluid 1170 (free fluid) + 720-960 (water flushes) = 6096-9972 mL (unable to account for po fluids)    ASSESSED MALNUTRITION STATUS  % Weight Loss:  None noted  % Intake:  No decreased intake noted  Subcutaneous Fat Loss:  None observed  Loss of Muscle Mass:  None observed  Fluid Retention:  None noted    Malnutrition Diagnosis:  Patient does not meet two of the above criteria necessary for diagnosing malnutrition    DIAGNOSIS   Nutrition Diagnosis:  Predicted excessive nutrient intake related to G-tube feeding and oral intake as evidenced by no reduction in TF since started po intake       INTERVENTIONS   Nutrition Prescription   Recommend 5 " day calorie count to assess oral intake to wean TF    IMPLEMENTATION   Assessed learning needs and learning preference  Teaching Method(s) used: Explanation  Vitamin and Mineral Supplements: suggest complete multivitamin and mineral once weaned off TF  Diet Education:  Provided education on calorie intake   Nutrition Education (Content):   a)  Discussed need for calorie count to assess actual po intake so RD can assess calories and protein consumption so TF can be weaned.  Recommend weight as has not been weighed since February 2021.  Commended parents for care that is being provided to patient.    Patient's family and staff member verbalizes understanding of next steps for need for calorie count.    Expected patient engagement: N/A as non verbal   Other: Group home director will be on next appointment video call.    GOALS  Start 5 day calorie count and fax to RD  Weigh patient prior to next appointment     FOLLOW UP/MONITORING   Progress towards goals will be monitored and evaluated per protocol and Practice Guidelines  Patient to follow up in 2 weeks  RD name and number provided     Time Spent with Patient  36 minutes    Lauryn Licona, RD, LD  [facility] Outpatient Dietitian  [phone] (office phone)

## 2021-06-21 NOTE — LETTER
Letter by Fouzia High CNP at      Author: Fouzia High CNP Service: -- Author Type: --    Filed:  Encounter Date: 2020 Status: (Other)         Patient: Scot Bishop   MR Number: [id number]   YOB: 1980   Date of Visit: 2020       [facility]      NAME:  Scot Bishop             :  1980    MRN: [id number]    CODE STATUS: DNR    FACILITY: [facility] [[id number]]       CHIEF COMPLAIN/REASON FOR VISIT:  Chief Complaint   Patient presents with   ? Review Of Multiple Medical Conditions     brain injury       HISTORY OF PRESENT ILLNESS: Scot Bishop is a 40 y.o. male being seen per today for review of multiple medival conditions. . WNL mentation this am, brain injury chronic non responsive with some eye tracking and unpurposeful movment.  He is back from the hospital after an extensive 3-week stay, he had aggressive behaviors here on the TCU and was sent out and was followed by psychiatric care at [facility]. We have been monitoring his LOC and has been sleepy, has TBI due to hypoxia.   He is a TBI and seen in his room this a.m. nonverbal he has a history of aphasia very calm poor eye contact.  [facility] in May 2020. At that juncture he was in cardiac cath lab and suffered a hypoxic brain injury, high temp and thrombotic event. He is seen in his room today. Non verbal and did not track with his eyes. He will make eye contact. H. He will need ongoing rehab services with PT/OT/ST. Has Gtube due to his dysphagia,  Unfortunealy due to Scot Hypoxic brain injury he will not be able to understand hygiene education and nursing will need to meet these needs for pt. Therapy continues to work with him, seen ambualting with SBA two.Staff to anticipate needs and provide good elizabeth care two times a day and prn.   continues to work with family towards DC to a group home after TCU stay as  patient will be unable to care for self, remains dependant on staff for all ADL and mobility as well as GTube for feedings, he also has an order for puréed diet level 4 with thick nectar liquids.  However at this juncture we will look at the tube feeding this is more pleasure feedings and if he gets aggressive he could aspirate. Scot is very awake today, has had decrease in his Depakote recently reduced and he is much more awake  Today with minimal eye contact, up in a broda chair for positioning and he will continue to need 24 hour nursing care.      . No Known Allergies:     Current Outpatient Medications   Medication Sig   ? acetaminophen (TYLENOL) 650 mg/20.3 mL Soln 650 mg every 6 (six) hours as needed. Via PEG-tube    ? amantadine HCL (SYMMETREL) 50 mg/5 mL solution 100 mg every 12 (twelve) hours. Via PEG-tube   ? aspirin 81 MG EC tablet 81 mg daily. Via PEG-tube   ? atorvastatin (LIPITOR) 40 MG tablet 40 mg at bedtime. Via PEG-tube   ? haloperidoL (HALDOL) 5 MG tablet 5 mg by G-tube route 3 (three) times a day.   ? lisinopriL (PRINIVIL,ZESTRIL) 2.5 MG tablet 1.25 mg daily. Via PEG-tube, hold if SBP <90   ? LORazepam (ATIVAN) 0.5 MG tablet 1 tablet (0.5 mg total) by G-tube route 4 (four) times a day for 3 days.   ? metoprolol tartrate (LOPRESSOR) 25 MG tablet 6.25 mg 2 (two) times a day. Via PEG-tube, hold if SBP <90, HR <60   ? neomycin-bacitracin-polymyxin B (NEOSPORIN) 3.5-400-5,000 mg-unit-unit OiPk ointment Apply 1 application topically 2 (two) times a day.   ? omeprazole (PRILOSEC) 2 mg/mL SusR suspension 40 mg daily before breakfast. Via PEG-tube   ? PARoxetine (PAXIL) 20 MG tablet 20 mg by G-tube route every morning.   ? QUEtiapine (SEROQUEL) 25 MG tablet 50 mg 3 (three) times a day. Via PEG-tube give 25 mg am/pm with 12.5 mg mid day due to increased agitation   ? ticagrelor (BRILINTA) 90 mg Tab 90 mg 2 (two) times a day. Via PEG-tube   ? traZODone (DESYREL) 50 MG tablet 50 mg at bedtime. Via PEG-tube    ? valproate (DEPAKENE) 250 mg/5 mL 250 mg by G-tube route 2 (two) times a day.   ? white petrolatum (AQUAPHOR ORIGINAL) 41 % Oint Apply 1 application topically 2 (two) times a day. Apply to feet/legs         REVIEW OF SYSTEMS:  Unable to verbalize due to aphasia    PHYSICAL EXAMINATION:  Vitals:    12/31/20 1558   BP: 112/66   Pulse: 100   Temp: 98.5  F (36.9  C)   Weight: 154 lb 9.6 oz (70.1 kg)         GENERAL: Does not follow simple commands, makes eye contact, non verbal.  HEENT: Head is normocephalic with normal hair distribution. No evidence of trauma.Oral: reddened wth white coating on tongue left inner cheek Ears: No acute purulent discharge. Eyes: Conjunctivae pink with no scleral jaundice. Nose: Normal mucosa and septum. NECK: Supple with no cervical or supraclavicular lymphadenopathy. Trachea is midline, healing trach stomaLungs : He is CTA  EXTREMITIES: Atraumatic. Full range of motion on both upper and lower extremities, there is no tenderness to palpation, no pedal edema, no cyanosis or clubbing, no calf tenderness, normal cap refill, no joint swelling.  SKIN: Warm and dry, no erythema noted, abdomen with g tube  NEUROLOGICAL: The patient is oriented to person, TBI    Social Distancing with PPE practiced due to Covid 19    LABS:    Lab Results   Component Value Date    WBC 10.6 12/08/2020    HGB 13.7 (L) 12/08/2020    HCT 43.8 12/08/2020    MCV 94 12/08/2020     12/08/2020       Results for orders placed or performed in visit on 12/24/20   Basic Metabolic Panel   Result Value Ref Range    Sodium 138 136 - 145 mmol/L    Potassium 4.0 3.5 - 5.0 mmol/L    Chloride 102 98 - 107 mmol/L    CO2 23 22 - 31 mmol/L    Anion Gap, Calculation 13 5 - 18 mmol/L    Glucose 126 (H) 70 - 125 mg/dL    Calcium 9.6 8.5 - 10.5 mg/dL    BUN 17 8 - 22 mg/dL    Creatinine 0.75 0.70 - 1.30 mg/dL    GFR MDRD Af Amer >60 >60 mL/min/1.73m2    GFR MDRD Non Af Amer >60 >60 mL/min/1.73m2           No results found for:  HGBA1C  No results found for: [id number]  No results found for: [id number]    ASSESSMENT/PLAN:  1. Acute respiratory failure with hypoxia (H)    2. Hypoxic brain injury (H)      1. Acute resp failure with hypoxia : NAD, Sats are stable, however due to this event Scot has hypoxic brain injury and remains dependant on staff for all cares, tf ing assist for nutrition and med management. He will require support after discontinue and will need the following:  Patient is a 40 year old with a dx of hypoxic brain injury. Patient has mobility limitations significantly impairing his/her ability to participate in mobility-related activities of daily living such as toileting, dressing, grooming, and bathing in customary locations in the home. Mobility limitation cannot be sufficiently resolved by us of an appropriately fitted cane or walker. Caregiver is able to safely use a manual wheelchair. Patients functional mobility deficit can be sufficiently resolved by the use of a manual wheelchair. He needs an adjustable wheelchair that is able to support his torso and would benefit from Broda wheelchair with a reclining back or the equivalent. He has decreased trunk control and needs to have the ability to have a full range of positioning from vertical to flat. He has significantly impaired ability to reposition due to his hypoxic brain injury. He is incontinent of both bowel and bladder and has a significant risk of pressure ulcers due to his incontinence and inability to self adjust positioning. He utilizes a jessica lift for all transfers and is unable to propel himself in a wheelchair with either his legs or his arms. He has a G-Tube for feeding and needs to remain in an upright position after feeding. According to his neurologist he is in a permanent vegetative or minimally conscious state in a condition that most likely will be permanent and is highly unlikely that he will regain lost cortical functions.   Patient is a 40  year old male with a dx of hypoxic brain injury. Patient requires positioning of the body in ways not feasible with an ordinary bed due to the hypoxic brain injury that is expected to last for his lifetime. Patient requires, for alleviation of pain, positioning of the body in ways not feasible with an ordinary bed. Patient requires the head of the bed to be elevated more than 30 degrees most of the time due to his use of a feeding tube and aspiration due to dysphagia. Patient requires a bed height different than a fixed height hospital bed to permit transfers to chair, wheelchair, or standing position. Patient requires frequent changes in body position and/or have an immediate need for change in body position related to his hypoxic brain injury and incontinence.   Patient is a 40 year old male with a dx of hypoxic brain injury. Patient has mobility limitations significantly impairing his ability to participate in mobility-related activities of daily living such as toileting, dressing, grooming, and bathing in customary locations in the home. Mobility limitation cannot be sufficiently resolved by us of an appropriately fitted cane or walker or other transfer. Patients functional mobility deficit can be sufficiently resolved by the use of a South lift to assist with transfers. Without use of the life, Patient would be confined to bed. Staff are  appropriately trained and able to use a South lift safely.      2. Hypoxic Brain injury: Scot is in a semi vegative state, med changes made in acute care and  is less aggressive now. Total dependance on staff for all ADL and med/tube management.  Pallative consult with [facility] in place. Yoan FUNES did decrease his Depakote but remains calm and minimally responsive. Further dose reductions not recommended at this time due to adverse reactions and behaviors he has ad in the past       Electronically signed by:  Fouzia High CNP  This progress note was completed using Dragon  software and there may be grammatical errors.

## 2021-06-21 NOTE — LETTER
Letter by Cortney Bahena MBBS at      Author: Cortney Bahena MBBS Service: -- Author Type: --    Filed:  Encounter Date: 12/8/2020 Status: (Other)         Patient: Scot Bishop   MR Number: [id number]   YOB: 1980   Date of Visit: 12/8/2020       [facility] Admission note      Patient: Scot Bishop  MRN: [id number]        [facility] [[id number]]  Reason for Visit     Chief Complaint   Patient presents with   ? Problem Visit   abnormal labs/ fever    Code Status     dnr /comfort foccused    Assessment     - acute hypernatremia na worse on recheck 149  - fever with temp  101.2 in TCU  -Abnormal liver function enzymes with elevated ALT  -Hypoalbuminemia with a low prealbumin of 3.2  -Hyperchloremia with a chloride level of 112  - excessive sedation and pt noted to be sleeping and poorly arousable  - =s/p anoxic brain injury   - Dysphagia on TF  -- long hospitalization with agitated /aggressive behavoir      Plan     Patient has been readmitted to the TCU  He was readmitted in the hospital and had an extensive work-up and stay with both neurology and psychiatry along with palliative care involved in his care.  He was seen as follow-up for his mood and behaviors as well as highly abnormal labs.  He was sent to the emergency room at the request of family for work-up of fever.  Labs reviewed and he had extensive imaging of the chest abdomen and pelvis with no acute finding.  CT of the brain was negative.  Urine culture has shown no growth and labs show resolution of his leukocytosis  His recheck white count today is improved at 10.6  Unfortunately he has persistent hypernatremia as well as hyperchloremia.  Labs also show abnormal liver enzymes remain elevated ALT now.  He is on tube feeding and his water flushes have been increased to almost 3-1/2 L within 24 hours.  His diuretics have been discontinued.  His BUN has improved indicating that his dehydration is  resolving.  Suspect this could be medication related also.  Continue to push his fluids.  We will decrease his Depakote sparingly from 125 mg 4 tablets twice daily to 3 tablets twice daily.  Family has been calling requesting a taper of his medications.  Since he has had significant aggravation of behaviors requiring behavioral health placement will not discontinue his meds completely but would like to taper him slowly.  Staff requested to monitor and update for any change in behaviors.  Palliative approach to cares has been requested however family wants to pursue treatments for now.  Continue with his care plan and recheck labs tomorrow a.m.  Consider ultrasound of the liver if labs remain persistently elevated    History     Patient is a very pleasant 40 y.o. male who is readmitted to TCU  Pt  Is admitted to behavioral health unit due to aggressive behaviors; with hitting staff and difficulty to care  Various interventions were tried and were unsuccessful and eventually patient was started on psychotropic meds.  Eventually his behaviors escalated and he had been sent to the behavioral health unit he comes back on high doses of medication including Seroquel trazodone.  He is also on Depakote 4 times a day.  However he was sent back to the hospital because of persistent fever with leukocytosis and highly abnormal labs.  Recheck labs were reviewed today and he continues to have elevated sodium.  Recheck is 149.  He is currently off his Lasix.  He is getting almost close to 4 L of fluid/plain water in addition to her tube feeding daily.  This is reflected in improvement in his BUN.  In addition his liver enzymes are now elevated which I suspect is medication related.  He continues to have fever on and off with no localizing symptoms extensive work-up done in the TCU was negative.  Subsequently he was sent back to the emergency room where he had imaging of his CT abdomen pelvis as well as chest which was negative and  nonconclusive.  He had a CT of his brain which also did not show any acute findings.  Suspect this is baseline brainstem dysfunction  At baseline he is nonambulatory and bedbound does not make any eye contact or respond in any way to any stimulus  BP ARE LOW    Past Medical History     Active Ambulatory (Non-Hospital) Problems    Diagnosis   ? Fever   ? Aggression   ? Agitation   ? Low BP   ? G tube feedings (H)   ? Yeast infection   ? Impetigo any site   ? Hypoxic brain damage (H)   ? Acute respiratory failure with hypoxia (H)   ? Gastrojejunostomy tube status (H)   ? Hypoxic brain injury (H)   ? Dysphagia   ? Cardiac arrest (H)   ? Primary Lymphadenitis   ? Nicotine Dependence   ? Backache     Past Medical History:   Diagnosis Date   ? Acute respiratory failure with hypoxia (H)    ? Aggression 11/09/2020   ? Agitation 09/21/2020   ? LOWELL (acute kidney injury) (H)    ? Back injury, sequela 10/27/2017   ? Cardiac arrest (H)    ? Cardiac arrest (H) 05/17/2020   ? Cognitive disorder 11/11/2020   ? Dysphagia    ? Dysphagia 11/11/2020   ? Gastrojejunostomy tube status (H) 11/11/2020   ? HTN (hypertension)    ? Hypoxic ischemic encephalopathy    ? Low blood pressure 09/17/2020   ? Nonverbal 11/11/2020   ? NSTEMI (non-ST elevated myocardial infarction) (H)    ? TBI (traumatic brain injury) (H) 06/25/2020       Past Social History     Reviewed, and he  reports that he has been smoking. He has been smoking about 1.00 pack per day. He has never used smokeless tobacco. He reports current alcohol use. He reports that he does not use drugs.    Family History     Reviewed, and family history includes Cancer in his maternal grandfather; Diabetes in his maternal aunt; Diabetes type I in his maternal aunt; Lung cancer in his maternal grandfather; Other in his father.    Medication List   Post Discharge Medication Reconciliation Status:    QUEtiapine (SEROQUEL) 50 mg tablet    Indications: Aggression Administer 1 tablet via G-tube 3  times daily.   0 11/27/2020     haloperidoL (HALDOL) 5 mg tablet    Indications: Aggression Administer 1 tablet via G-tube 3 times daily.   0 11/27/2020     divalproex sprinkles (DEPAKOTE SPRINKLES) 125 mg capsule    Indications: Aggression 4 capsules 2 times daily. G tube   0 11/27/2020     traZODone (DESYREL) 50 mg tablet    Indications: Aggression Administer 1 tablet via G-tube at bedtime.   0 11/27/2020     potassium chloride (K-APOLLO) 20 mEq packet    Indications: Dehydration 1 Packet once daily with a meal. G-tube   0 11/27/2020     omeprazole (PRILOSEC) 2 mg/mL susp oral suspension    Indications: Dysphagia, unspecified type Administer 20 mL via G-tube once daily.   0 11/27/2020     metoprolol tartrate (LOPRESSOR) 25 mg tablet    Indications: Cardiac arrest (HC) Administer 0.25 tablets via G-tube 2 times daily.   0 11/27/2020     acetaminophen (TYLENOL) 325 mg tablet    Indications: History of anoxic brain injury Administer 2 tablets via G-tube every 6 hours if needed. Max acetaminophen dose: 4000mg in 24 hrs.    0 11/27/2020     ticagrelor (BRILINTA) 90 mg tablet    Indications: Cardiac arrest (HC) Administer 1 tablet via nasogastric tube 2 times daily.   0 11/27/2020     PARoxetine (PAXIL) 20 mg tablet    Indications: Aggression Administer 1 tablet via G-tube once daily.   0 11/27/2020     atorvastatin (LIPITOR) 40 mg tablet    Indications: Cardiac arrest (HC) Administer 1 tablet via G-tube at bedtime.   0 11/27/2020     aspirin chewable 81 mg chewable tablet    Indications: Cardiac arrest (HC) Administer 1 tablet via G-tube once daily.   0 11/27/2020     amantadine HCL (SYMMETREL) 50 mg/5 mL solution    Indications: History of anoxic brain injury Administer 10 mL via G-tube every 12              Allergies     No Known Allergies    Review of Systems   A comprehensive review of 14 systems was done. Pertinent findings noted here and in history of present illness. All the rest negative.  Constitutional:  Negative.  Negative for fever, chills, he has activity change, appetite change and fatigue.   HENT: Negative for congestion and facial swelling.    Eyes: Negative for photophobia, redness and visual disturbance.   Respiratory: Negative for cough and chest tightness.    Cardiovascular: Negative for chest pain, palpitations and leg swelling.   Gastrointestinal: Negative for nausea, diarrhea, constipation, blood in stool and abdominal distention.   Has incontinence  Noted to be scratching his buttocks and has rectal / perianal bleeding  Genitourinary: Negative.    Musculoskeletal: Negative.  Remains WC bound  Skin: Negative.    Neurological: Negative for dizziness, tremors, syncope, weakness, light-headedness and headaches.   Hematological: Does not bruise/bleed easily.   Psychiatric/Behavioral: Negative.  Sedated and sleepy      Physical Exam     Recent Vitals 12/8/2020   Weight 158 lbs   /73   Pulse 99   Temp 100.7   Temp src -   SpO2 -   Some recent data might be hidden   t 101  p133 bp 102/65    Constitutional: Oriented to none and appears well-developed.   HEENT:  Normocephalic and atraumatic.  Eyes: Conjunctivae and EOM are normal. Pupils are equal, round, and reactive to light. No discharge.  No scleral icterus. Nose normal. Mouth/Throat: Oropharynx is clear and moist. No oropharyngeal exudate.    NECK: Normal range of motion. Neck supple. No JVD present. No tracheal deviation present. No thyromegaly present.   CARDIOVASCULAR: Normal rate, regular rhythm and intact distal pulses.  Exam reveals no gallop and no friction rub.  Systolic murmur present.  PULMONARY: Effort normal and breath sounds normal. No respiratory distress.No Wheezing or rales.  ABDOMEN: Soft. Bowel sounds are normal. No distension and no mass.  There is no tenderness. There is no rebound and no guarding. No HSM.  Peg tube present  Perianal skin intact ; slight excoriation  MUSCULOSKELETAL: Normal range of motion. No edema and no  tenderness. Mild kyphosis, no tenderness.  LYMPH NODES: Has no cervical, supraclavicular, axillary and groin adenopathy.   NEUROLOGICAL: Alert and oriented to none. No cranial nerve deficit.  Normal muscle tone. Coordination normal.   GENITOURINARY: Deferred exam.  SKIN: Skin is warm and dry. No rash noted. No erythema. No pallor.   EXTREMITIES: No cyanosis, no clubbing, no edema. No Deformity.  PSYCHIATRIC: Normal mood, affect and behavior.sleepy and poorly responsive      Lab Results     Recent Results (from the past 240 hour(s))   Basic Metabolic Panel   Result Value Ref Range    Sodium 149 (H) 136 - 145 mmol/L    Potassium 5.6 (H) 3.5 - 5.0 mmol/L    Chloride 111 (H) 98 - 107 mmol/L    CO2 14 (L) 22 - 31 mmol/L    Anion Gap, Calculation 24 (H) 5 - 18 mmol/L    Glucose 101 70 - 125 mg/dL    Calcium 10.8 (H) 8.5 - 10.5 mg/dL    BUN 38 (H) 8 - 22 mg/dL    Creatinine 1.11 0.70 - 1.30 mg/dL    GFR MDRD Af Amer >60 >60 mL/min/1.73m2    GFR MDRD Non Af Amer >60 >60 mL/min/1.73m2   Magnesium   Result Value Ref Range    Magnesium 2.3 1.8 - 2.6 mg/dL   Hepatic Profile   Result Value Ref Range    Bilirubin, Total 0.8 0.0 - 1.0 mg/dL    Bilirubin, Direct 0.2 <=0.5 mg/dL    Protein, Total 9.3 (H) 6.0 - 8.0 g/dL    Albumin 4.4 3.5 - 5.0 g/dL    Alkaline Phosphatase 104 45 - 120 U/L    AST 30 0 - 40 U/L    ALT 25 0 - 45 U/L   HM1 (CBC with Diff)   Result Value Ref Range    WBC 17.0 (H) 4.0 - 11.0 thou/uL    RBC 5.47 4.40 - 6.20 mill/uL    Hemoglobin 15.9 14.0 - 18.0 g/dL    Hematocrit 52.2 40.0 - 54.0 %    MCV 95 80 - 100 fL    MCH 29.1 27.0 - 34.0 pg    MCHC 30.5 (L) 32.0 - 36.0 g/dL    RDW 15.5 (H) 11.0 - 14.5 %    Platelets 346 140 - 440 thou/uL    MPV 12.0 8.5 - 12.5 fL    Neutrophils % 86 (H) 50 - 70 %    Lymphocytes % 7 (L) 20 - 40 %    Monocytes % 6 2 - 10 %    Eosinophils % 0 0 - 6 %    Basophils % 0 0 - 2 %    Immature Granulocyte % 1 (H) <=0 %    Neutrophils Absolute 14.6 (H) 2.0 - 7.7 thou/uL    Lymphocytes Absolute  1.2 0.8 - 4.4 thou/uL    Monocytes Absolute 1.0 (H) 0.0 - 0.9 thou/uL    Eosinophils Absolute 0.0 0.0 - 0.4 thou/uL    Basophils Absolute 0.1 0.0 - 0.2 thou/uL    Immature Granulocyte Absolute 0.2 (H) <=0.0 thou/uL   Urinalysis   Result Value Ref Range    Color, UA Orange (!) Colorless, Yellow, Straw, Light Yellow    Clarity, UA Turbid (!) Clear    Glucose, UA Negative Negative    Bilirubin, UA Negative Negative    Ketones, UA Negative Negative    Specific Gravity, UA 1.034 (H) 1.001 - 1.030    Blood, UA Negative Negative    pH, UA 6.0 4.5 - 8.0    Protein, UA Trace (!) Negative mg/dL    Urobilinogen, UA <2.0 E.U./dL <2.0 E.U./dL, 2.0 E.U./dL    Nitrite, UA Negative Negative    Leukocytes, UA Negative Negative    Bacteria, UA None Seen None Seen hpf    RBC, UA 0-2 None Seen, 0-2 hpf    WBC, UA 0-5 None Seen, 0-5 hpf    Squam Epithel, UA 0-5 None Seen, 0-5 lpf    Amorphous, UA Many (!) None Seen   Culture, Urine    Specimen: Urine, Straight Cath   Result Value Ref Range    Culture No Growth    Basic Metabolic Panel   Result Value Ref Range    Sodium 152 (HH) 136 - 145 mmol/L    Potassium 3.9 3.5 - 5.0 mmol/L    Chloride 114 (H) 98 - 107 mmol/L    CO2 27 22 - 31 mmol/L    Anion Gap, Calculation 11 5 - 18 mmol/L    Glucose 135 (H) 70 - 125 mg/dL    Calcium 9.8 8.5 - 10.5 mg/dL    BUN 39 (H) 8 - 22 mg/dL    Creatinine 0.91 0.70 - 1.30 mg/dL    GFR MDRD Af Amer >60 >60 mL/min/1.73m2    GFR MDRD Non Af Amer >60 >60 mL/min/1.73m2   COVID-19 VIRUS (CORONAVIRUS) BY PCR - EXTERNAL RESULT    Specimen: Other    Specimen type and source: Other, Specimen from nasopharyngeal structure (specimen)   Result Value Ref Range    COVID-19 Virus by PCR (External Result) Negative Negative   COVID-19 Virus PCR MRF    Specimen: Respiratory   Result Value Ref Range    COVID-19 VIRUS SPECIMEN SOURCE Nasopharyngeal     2019-nCOV Not Detected    Basic Metabolic Panel   Result Value Ref Range    Sodium 149 (H) 136 - 145 mmol/L    Potassium 3.8  3.5 - 5.0 mmol/L    Chloride 112 (H) 98 - 107 mmol/L    CO2 25 22 - 31 mmol/L    Anion Gap, Calculation 12 5 - 18 mmol/L    Glucose 137 (H) 70 - 125 mg/dL    Calcium 8.8 8.5 - 10.5 mg/dL    BUN 23 (H) 8 - 22 mg/dL    Creatinine 0.74 0.70 - 1.30 mg/dL    GFR MDRD Af Amer >60 >60 mL/min/1.73m2    GFR MDRD Non Af Amer >60 >60 mL/min/1.73m2   HM2(CBC w/o Differential)   Result Value Ref Range    WBC 10.6 4.0 - 11.0 thou/uL    RBC 4.68 4.40 - 6.20 mill/uL    Hemoglobin 13.7 (L) 14.0 - 18.0 g/dL    Hematocrit 43.8 40.0 - 54.0 %    MCV 94 80 - 100 fL    MCH 29.3 27.0 - 34.0 pg    MCHC 31.3 (L) 32.0 - 36.0 g/dL    RDW 14.7 (H) 11.0 - 14.5 %    Platelets 171 140 - 440 thou/uL    MPV 13.5 (H) 8.5 - 12.5 fL   Magnesium   Result Value Ref Range    Magnesium 2.3 1.8 - 2.6 mg/dL   Hepatic Profile   Result Value Ref Range    Bilirubin, Total 0.7 0.0 - 1.0 mg/dL    Bilirubin, Direct 0.2 <=0.5 mg/dL    Protein, Total 7.1 6.0 - 8.0 g/dL    Albumin 3.2 (L) 3.5 - 5.0 g/dL    Alkaline Phosphatase 79 45 - 120 U/L    AST 54 (H) 0 - 40 U/L     (H) 0 - 45 U/L           DARIO Vargas

## 2021-06-21 NOTE — LETTER
Letter by Cortney Bahena MBBS at      Author: Cortney Bahena MBBS Service: -- Author Type: --    Filed:  Encounter Date: 12/1/2020 Status: (Other)         Patient: Scot Bishop   MR Number: [id number]   YOB: 1980   Date of Visit: 12/1/2020       [facility] Admission note      Patient: Scot Bishop  MRN: [id number]  Date of Service: 12/1/2020      [facility] [[id number]]  Reason for Visit     Chief Complaint   Patient presents with   ? H & P       Code Status     dnr /comfort foccused    Assessment     - acute hypernatremia na 149   - acute hyperkalemia with K of 5.6 in tcu today  - hypercalcemia with ca of 10.8  - elevated BUN of 38  - profound leukocytosis with 17k  - fever with temp  100  - unstable vitals  - excessive sedation and pt noted to be sleeping and poorly arousable  - =s/p anoxic brain injury   - Dysphagia on TF  -- long hospitalization with agitated /aggressive behavoir      Plan     Patient has been readmitted to the TCU  He was readmitted in the hospital and had an extensive work-up and stay with both neurology and psychiatry along with palliative care involved in his care.  He has persistent hypoxic brain injury  Unfortunately no improvement and neurology was involved in his care and felt he was in a permanent vegetative state.  Palliative care was involved and after much discussion the family finally agreed to change his CODE STATUS from full code to DNR.  He will require long-term placement in a group home  In the meantime he has been discharged back to the TCU awaiting placement.  He has noted to be dysphagic and remains on tube feeding.  Recheck labs were reviewed and he has persistent electrolyte abnormalities with elevated sodium as well as calcium noted.  These are critically high.  Suspect this is because of dehydration as he is not eating and drinking.  His tube feeding regimen was reviewed.  His water flushes will be increased to 250 mL 4  times a day.  Recheck BMP closely.  Monitor electrolyte status closely.  Discontinue potassium supplementation due to hyperkalemia.  Perianal and sacral skin examined because of bleeding concerns and no open area noted no hemorrhoids.  Suspect it is irritation from incontinence and nursing requested to put barrier ointment  Monitor for any skin breakdown.  Also check a UA UC due to persistent leukocytosis.  Check Covid status because of recent hospitalization.  Blood pressures remain low and he is no longer on any antihypertensives  He does have baseline autonomic insufficiency and can have unstable vitals from that too.  Will monitor closely.  His psychiatric meds were adjusted in the hospital because of increasing aggression  He was refusing cares but however now he appears to be quite sedated.  Depakote level was checked in the hospital prior to discharge.  Since he has just returned from the hospital we will monitor trends but in the meantime if he continues to be sedated nursing advised to cut down his Depakote to 3 times a day will dosing instead of 4 times a day  If he deteriorates would strongly encourage family to consider him comfortable and consider not rehospitalizing him.  We can switch him to hospice cares.  Continue with his fall precautions  Care conference done with his family we have agreed to not hospitalized.  We will asked palliative care to follow him in the TCU and keep him conservatively treated    History     Patient is a very pleasant 40 y.o. male who is readmitted to TCU  Pt  Is admitted to behavioral health unit due to aggressive behaviors; with hitting staff and difficulty to care  Various interventions were tried and were unsuccessful and eventually patient was started on psychotropic meds.  Eventually his behaviors escalated and he had been sent to the behavioral health unit he comes back on high doses of medication including Seroquel trazodone.  He is also on Depakote 4 times a  day.  Continued.  His recheck liver functions as ordered from the hospital however are normal.  He has bromocriptine has been discontinued and several of his other meds have been discontinued  He is noted to be sleeping excessively and quite sedated.  Staff noted that he was squirming and trying to fall earlier in the day but now he has been sleeping all day long.  He has  abnormal labs today.  He has electrolyte abnormalities consistent mostly with dehydration  Anoxic brain damage with no improvement in cognitive status.  He had remained a full CODE STATUS in the TCU.  However palliative care was involved in discussion done with family members by neurology he is in a permanent vegetative or minimally conscious state.  It is highly unlikely that he will regain loss cortical functions.  Family finally did agree to make him DNR  He has a temp of 100 today .  have leukocytosis of 17 K    Past Medical History     Active Ambulatory (Non-Hospital) Problems    Diagnosis   ? Aggression   ? Agitation   ? Low BP   ? G tube feedings (H)   ? Yeast infection   ? Impetigo any site   ? Hypoxic brain damage (H)   ? Acute respiratory failure with hypoxia (H)   ? Gastrojejunostomy tube status (H)   ? Hypoxic brain injury (H)   ? Dysphagia   ? Cardiac arrest (H)   ? Primary Lymphadenitis   ? Nicotine Dependence   ? Backache     Past Medical History:   Diagnosis Date   ? Acute respiratory failure with hypoxia (H)    ? Aggression 11/09/2020   ? Agitation 09/21/2020   ? LOWELL (acute kidney injury) (H)    ? Back injury, sequela 10/27/2017   ? Cardiac arrest (H)    ? Cardiac arrest (H) 05/17/2020   ? Cognitive disorder 11/11/2020   ? Dysphagia    ? Dysphagia 11/11/2020   ? Gastrojejunostomy tube status (H) 11/11/2020   ? HTN (hypertension)    ? Hypoxic ischemic encephalopathy    ? Low blood pressure 09/17/2020   ? Nonverbal 11/11/2020   ? NSTEMI (non-ST elevated myocardial infarction) (H)    ? TBI (traumatic brain injury) (H) 06/25/2020        Past Social History     Reviewed, and he  reports that he has been smoking. He has been smoking about 1.00 pack per day. He has never used smokeless tobacco. He reports current alcohol use. He reports that he does not use drugs.    Family History     Reviewed, and family history includes Cancer in his maternal grandfather; Diabetes in his maternal aunt; Diabetes type I in his maternal aunt; Lung cancer in his maternal grandfather; Other in his father.    Medication List   Post Discharge Medication Reconciliation Status:   QUEtiapine (SEROQUEL) 50 mg tablet    Indications: Aggression Administer 1 tablet via G-tube 3 times daily.   0 11/27/2020     haloperidoL (HALDOL) 5 mg tablet    Indications: Aggression Administer 1 tablet via G-tube 3 times daily.   0 11/27/2020     divalproex sprinkles (DEPAKOTE SPRINKLES) 125 mg capsule    Indications: Aggression 4 capsules 2 times daily. G tube   0 11/27/2020     traZODone (DESYREL) 50 mg tablet    Indications: Aggression Administer 1 tablet via G-tube at bedtime.   0 11/27/2020     potassium chloride (K-APOLLO) 20 mEq packet    Indications: Dehydration 1 Packet once daily with a meal. G-tube   0 11/27/2020     omeprazole (PRILOSEC) 2 mg/mL susp oral suspension    Indications: Dysphagia, unspecified type Administer 20 mL via G-tube once daily.   0 11/27/2020     metoprolol tartrate (LOPRESSOR) 25 mg tablet    Indications: Cardiac arrest (HC) Administer 0.25 tablets via G-tube 2 times daily.   0 11/27/2020     acetaminophen (TYLENOL) 325 mg tablet    Indications: History of anoxic brain injury Administer 2 tablets via G-tube every 6 hours if needed. Max acetaminophen dose: 4000mg in 24 hrs.    0 11/27/2020     ticagrelor (BRILINTA) 90 mg tablet    Indications: Cardiac arrest (HC) Administer 1 tablet via nasogastric tube 2 times daily.   0 11/27/2020     PARoxetine (PAXIL) 20 mg tablet    Indications: Aggression Administer 1 tablet via G-tube once daily.   0 11/27/2020      atorvastatin (LIPITOR) 40 mg tablet    Indications: Cardiac arrest (HC) Administer 1 tablet via G-tube at bedtime.   0 11/27/2020     aspirin chewable 81 mg chewable tablet    Indications: Cardiac arrest (HC) Administer 1 tablet via G-tube once daily.   0 11/27/2020     amantadine HCL (SYMMETREL) 50 mg/5 mL solution    Indications: History of anoxic brain injury Administer 10 mL via G-tube every 12              Allergies     No Known Allergies    Review of Systems   A comprehensive review of 14 systems was done. Pertinent findings noted here and in history of present illness. All the rest negative.  Constitutional: Negative.  Negative for fever, chills, he has activity change, appetite change and fatigue.   HENT: Negative for congestion and facial swelling.    Eyes: Negative for photophobia, redness and visual disturbance.   Respiratory: Negative for cough and chest tightness.    Cardiovascular: Negative for chest pain, palpitations and leg swelling.   Gastrointestinal: Negative for nausea, diarrhea, constipation, blood in stool and abdominal distention.   Has incontinence  Noted to be scratching his buttocks and has rectal / perianal bleeding  Genitourinary: Negative.    Musculoskeletal: Negative.  Remains WC bound  Skin: Negative.    Neurological: Negative for dizziness, tremors, syncope, weakness, light-headedness and headaches.   Hematological: Does not bruise/bleed easily.   Psychiatric/Behavioral: Negative.  Sedated and sleepy      Physical Exam     Recent Vitals 12/1/2020   Weight 166 lbs   BP 98/72   Pulse 100   Temp 97.1   Temp src -   SpO2 -   Some recent data might be hidden   r/c temp 99.9    Constitutional: Oriented to none and appears well-developed.   HEENT:  Normocephalic and atraumatic.  Eyes: Conjunctivae and EOM are normal. Pupils are equal, round, and reactive to light. No discharge.  No scleral icterus. Nose normal. Mouth/Throat: Oropharynx is clear and moist. No oropharyngeal exudate.     NECK: Normal range of motion. Neck supple. No JVD present. No tracheal deviation present. No thyromegaly present.   CARDIOVASCULAR: Normal rate, regular rhythm and intact distal pulses.  Exam reveals no gallop and no friction rub.  Systolic murmur present.  PULMONARY: Effort normal and breath sounds normal. No respiratory distress.No Wheezing or rales.  ABDOMEN: Soft. Bowel sounds are normal. No distension and no mass.  There is no tenderness. There is no rebound and no guarding. No HSM.  Peg tube present  Perianal skin intact ; slight excoriation  MUSCULOSKELETAL: Normal range of motion. No edema and no tenderness. Mild kyphosis, no tenderness.  LYMPH NODES: Has no cervical, supraclavicular, axillary and groin adenopathy.   NEUROLOGICAL: Alert and oriented to none. No cranial nerve deficit.  Normal muscle tone. Coordination normal.   GENITOURINARY: Deferred exam.  SKIN: Skin is warm and dry. No rash noted. No erythema. No pallor.   EXTREMITIES: No cyanosis, no clubbing, no edema. No Deformity.  PSYCHIATRIC: Normal mood, affect and behavior.sleepy and poorly responsive      Lab Results     Recent Results (from the past 240 hour(s))   Basic Metabolic Panel   Result Value Ref Range    Sodium 149 (H) 136 - 145 mmol/L    Potassium 5.6 (H) 3.5 - 5.0 mmol/L    Chloride 111 (H) 98 - 107 mmol/L    CO2 14 (L) 22 - 31 mmol/L    Anion Gap, Calculation 24 (H) 5 - 18 mmol/L    Glucose 101 70 - 125 mg/dL    Calcium 10.8 (H) 8.5 - 10.5 mg/dL    BUN 38 (H) 8 - 22 mg/dL    Creatinine 1.11 0.70 - 1.30 mg/dL    GFR MDRD Af Amer >60 >60 mL/min/1.73m2    GFR MDRD Non Af Amer >60 >60 mL/min/1.73m2   Magnesium   Result Value Ref Range    Magnesium 2.3 1.8 - 2.6 mg/dL   Hepatic Profile   Result Value Ref Range    Bilirubin, Total 0.8 0.0 - 1.0 mg/dL    Bilirubin, Direct 0.2 <=0.5 mg/dL    Protein, Total 9.3 (H) 6.0 - 8.0 g/dL    Albumin 4.4 3.5 - 5.0 g/dL    Alkaline Phosphatase 104 45 - 120 U/L    AST 30 0 - 40 U/L    ALT 25 0 - 45 U/L    HM1 (CBC with Diff)   Result Value Ref Range    WBC 17.0 (H) 4.0 - 11.0 thou/uL    RBC 5.47 4.40 - 6.20 mill/uL    Hemoglobin 15.9 14.0 - 18.0 g/dL    Hematocrit 52.2 40.0 - 54.0 %    MCV 95 80 - 100 fL    MCH 29.1 27.0 - 34.0 pg    MCHC 30.5 (L) 32.0 - 36.0 g/dL    RDW 15.5 (H) 11.0 - 14.5 %    Platelets 346 140 - 440 thou/uL    MPV 12.0 8.5 - 12.5 fL    Neutrophils % 86 (H) 50 - 70 %    Lymphocytes % 7 (L) 20 - 40 %    Monocytes % 6 2 - 10 %    Eosinophils % 0 0 - 6 %    Basophils % 0 0 - 2 %    Immature Granulocyte % 1 (H) <=0 %    Neutrophils Absolute 14.6 (H) 2.0 - 7.7 thou/uL    Lymphocytes Absolute 1.2 0.8 - 4.4 thou/uL    Monocytes Absolute 1.0 (H) 0.0 - 0.9 thou/uL    Eosinophils Absolute 0.0 0.0 - 0.4 thou/uL    Basophils Absolute 0.1 0.0 - 0.2 thou/uL    Immature Granulocyte Absolute 0.2 (H) <=0.0 thou/uL           Imaging Results     Xr Chest 1 View Portable    Result Date: 11/8/2020  EXAM: XR CHEST 1 VIEW PORTABLE LOCATION: [facility] DATE/TIME: 11/8/2020 5:46 PM INDICATION: possible aspiration. Cough. COMPARISON: None.     Negative chest.    Xr Abdomen Ap Portable    Result Date: 11/8/2020  EXAM: XR ABDOMEN AP PORTABLE LOCATION: [facility] DATE/TIME: 11/8/2020 5:30 PM INDICATION: eval jtube COMPARISON: None.     Gastrostomy tube with contrast instilled in the distal stomach and seen extending down the descending duodenum.             ADRIO Vargas

## 2021-06-21 NOTE — LETTER
Letter by Fouzia High CNP at      Author: Fouzia High CNP Service: -- Author Type: --    Filed:  Encounter Date: 10/14/2020 Status: (Other)         Patient: Scot Bishop   MR Number: [id number]   YOB: 1980   Date of Visit: 10/14/2020       [facility]      NAME:  Scot Bishop             :  1980    MRN: [id number]    CODE STATUS:  Full    FACILITY: [facility] [[id number]]       CHIEF COMPLAIN/REASON FOR VISIT:  Chief Complaint   Patient presents with   ? Review Of Multiple Medical Conditions     agitation       HISTORY OF PRESENT ILLNESS: Scot Bishop is a 40 y.o. male being seen for review of multiple medical conditions .. ST has started supervised soft food feedings, and they will increase food textures. Nursing reports ongoing agitation, and resistive with cares due to his brain injury. .   [facility] prior to a [facility] in May 2020. At that juncture he was in cardiac cath lab and suffered a hypoxic brain injury, high temp and thrombotic event. He is seen in his room today. Non verbal and did not track with his eyes. He will make eye contact. He has repetitive movement while in bed.He has repetitive laughing today. He varies between crying and laughing per nursing.  Moving legs and arms. Per nursing he  Slid from bed over week end, no apparent injuries, they are placing a scoop mattress for preventive falls. . He will need ongoing rehab services with PT/OT/ST. Has Gtube due to his dysphagia, which reviewed that weight has been stable.. Unfortunealy due to Scot Hypoxic brain injury he will not be able to understand hygiene education and nursing will need to meet these needs for pt. Therapy continues to work with him, seen ambualting with SBA two.Staff to anticipate needs and provide good elizabeth care two times a day and prn.   continues to work with family towards DC to a group home after TCU stay as patient  will be unable to care for self, remains dependant on staff for all ADL and mobility as well as GTube for feedings.Wt stable but he needs frequent 1 to 1 when up out of bed.        . No Known Allergies:     Current Outpatient Medications   Medication Sig   ? acetaminophen (TYLENOL) 650 mg/20.3 mL Soln 650 mg every 6 (six) hours as needed. Via PEG-tube    ? amantadine HCL (SYMMETREL) 50 mg/5 mL solution 100 mg every 12 (twelve) hours. Via PEG-tube   ? aspirin 81 MG EC tablet 81 mg daily. Via PEG-tube   ? atorvastatin (LIPITOR) 40 MG tablet 40 mg at bedtime. Via PEG-tube   ? bromocriptine (PARLODEL) 2.5 mg tablet 2.5 mg 2 (two) times a day. Via PEG-tube   ? chlorhexidine (PERIDEX) 0.12 % solution Apply 15 mL to the mouth or throat 4 (four) times a day.   ? levETIRAcetam (KEPPRA) 100 mg/mL solution 1,000 mg 2 (two) times a day. Via PEG-tube   ? lisinopriL (PRINIVIL,ZESTRIL) 2.5 MG tablet 1.25 mg daily. Via PEG-tube, hold if SBP <90   ? LORazepam (ATIVAN) 0.5 MG tablet 1 tablet (0.5 mg total) by G-tube route 4 (four) times a day.   ? metoclopramide (REGLAN) 5 MG tablet 5 mg 2 (two) times a day. Via PEG-tube   ? metoprolol tartrate (LOPRESSOR) 25 MG tablet 6.25 mg 2 (two) times a day. Via PEG-tube, hold if SBP <90, HR <60   ? neomycin-bacitracin-polymyxin B (NEOSPORIN) 3.5-400-5,000 mg-unit-unit OiPk ointment Apply 1 application topically 2 (two) times a day.   ? omeprazole (PRILOSEC) 2 mg/mL SusR suspension 40 mg daily before breakfast. Via PEG-tube   ? pantoprazole (PROTONIX) 40 MG tablet 40 mg daily. Via PEG-tube   ? potassium bicarb-citric acid 20 mEq TbEF 20 mEq daily. Via PEG-tube   ? QUEtiapine (SEROQUEL) 25 MG tablet 25 mg 2 (two) times a day. Via PEG-tube give 25 mg am/pm with 12.5 mg mid day due to increased agitation   ? ticagrelor (BRILINTA) 90 mg Tab 90 mg 2 (two) times a day. Via PEG-tube   ? traZODone (DESYREL) 50 MG tablet 50 mg at bedtime. Via PEG-tube   ? white petrolatum (AQUAPHOR ORIGINAL) 41 % Oint  Apply 1 application topically 2 (two) times a day. Apply to feet/legs         REVIEW OF SYSTEMS:  Unable to verbalize due to aphasia    PHYSICAL EXAMINATION:  Vitals:    10/14/20 2043   BP: 100/67   Pulse: 85   Temp: 97  F (36.1  C)   Weight: 166 lb (75.3 kg)         GENERAL: Does not follow simple commands, makes eye contact, non verbal.  HEENT: Head is normocephalic with normal hair distribution. No evidence of trauma. Ears: No acute purulent discharge. Eyes: Conjunctivae pink with no scleral jaundice. Nose: Normal mucosa and septum. NECK: Supple with no cervical or supraclavicular lymphadenopathy. Trachea is midline, healing trach stoma  Lungs : He is CTA  EXTREMITIES: Atraumatic. Full range of motion on both upper and lower extremities, there is no tenderness to palpation, no pedal edema, no cyanosis or clubbing, no calf tenderness, normal cap refill, no joint swelling.  SKIN: Warm and dry, no erythema noted, abdomen with g tube  NEUROLOGICAL: The patient is oriented to person, TBI  Social Distancing with PPE practiced due to Covid 19    LABS:    Lab Results   Component Value Date    WBC 8.3 08/06/2020    HGB 11.0 (L) 08/06/2020    HCT 34.9 (L) 08/06/2020    MCV 90 08/06/2020     08/06/2020       Results for orders placed or performed in visit on 10/05/20   Basic Metabolic Panel   Result Value Ref Range    Sodium 141 136 - 145 mmol/L    Potassium 4.2 3.5 - 5.0 mmol/L    Chloride 104 98 - 107 mmol/L    CO2 26 22 - 31 mmol/L    Anion Gap, Calculation 11 5 - 18 mmol/L    Glucose 106 70 - 125 mg/dL    Calcium 9.7 8.5 - 10.5 mg/dL    BUN 13 8 - 22 mg/dL    Creatinine 0.83 0.70 - 1.30 mg/dL    GFR MDRD Af Amer >60 >60 mL/min/1.73m2    GFR MDRD Non Af Amer >60 >60 mL/min/1.73m2           No results found for: HGBA1C  No results found for: [id number]  No results found for: [id number]    ASSESSMENT/PLAN:  1. Agitation    2. Dysphagia, unspecified type      1. Agitation: Scot continues to have agitation,  striking out and resisting care. We will increase his three times a day serouel to 25 mg two times a day with 12.5 every day which is an overall increase of 25 mg daily. Staff basically 1-1 Scot when up due to fall risk. dicontinue planning ongoing.    2. Dysphagia: Has  Started  soft food feedings. Remains on TFing eneteral for nutrition, bolus feedings , all meds via gtube. Nursing will need strict VS and Lung assessment QS and we will stop in the event he does not tolerate well.Lungs clear and no cough.  He continues to do well. ST reports will be starting new texture foods this week,     Electronically signed by:  Fouzia High CNP  This progress note was completed using Dragon software and there may be grammatical errors.

## 2021-06-21 NOTE — LETTER
Letter by Fouzia High CNP at      Author: Fouzia High CNP Service: -- Author Type: --    Filed:  Encounter Date: 2020 Status: (Other)         Patient: Scot Bishop   MR Number: [id number]   YOB: 1980   Date of Visit: 2020       [facility]      NAME:  Scot Bishop             :  1980    MRN: [id number]    CODE STATUS: DNR    FACILITY: [facility] [[id number]]       CHIEF COMPLAIN/REASON FOR VISIT:  Chief Complaint   Patient presents with   ? Review Of Multiple Medical Conditions     brain injury       HISTORY OF PRESENT ILLNESS: Scot Bishop is a 40 y.o. male being seen per today per nursing request due to low bp and high pulse rate. WNL mentation this am, brain injury chronic non responsive with some eye tracking and unpurposeful movment.  He is back from the hospital after an extensive 3-week stay, he had aggressive behaviors here on the TCU and was sent out and was followed by psychiatric care at [facility]. We have been monitoring his LOC and has been sleepy, has TBI due to hypoxia.   He is a TBI and seen in his room this a.m. nonverbal he has a history of aphasia very calm poor eye contact.  [facility] [place] [facility] [place] [facility] [place] May 2020. At that juncture he was in cardiac cath lab and suffered a hypoxic brain injury, high temp and thrombotic event. He is seen in his room today. Non verbal and did not track with his eyes. He will make eye contact. H. He will need ongoing rehab services with PT/OT/ST. Has Gtube due to his dysphagia,  Unfortunealy due to Scot Hypoxic brain injury he will not be able to understand hygiene education and nursing will need to meet these needs for pt. Therapy continues to work with him, seen ambualting with SBA two.Staff to anticipate needs and provide good elizabeth care two times a day and prn.   continues to work with family towards DC to a group home after TCU stay  as patient will be unable to care for self, remains dependant on staff for all ADL and mobility as well as GTube for feedings, he also has an order for puréed diet level 4 with thick nectar liquids.  However at this juncture we will look at the tube feeding this is more pleasure feedings and if he gets aggressive he could aspirate. Scot is very awake today, has had decrease in his Depakote recently reduced and he is much more awake  Today with good eye contact. I have reviewed Scot case with other rounding physician as we considered decrease in psycotropics but at this juncture will not make changes.      . No Known Allergies:     Current Outpatient Medications   Medication Sig   ? acetaminophen (TYLENOL) 650 mg/20.3 mL Soln 650 mg every 6 (six) hours as needed. Via PEG-tube    ? amantadine HCL (SYMMETREL) 50 mg/5 mL solution 100 mg every 12 (twelve) hours. Via PEG-tube   ? aspirin 81 MG EC tablet 81 mg daily. Via PEG-tube   ? atorvastatin (LIPITOR) 40 MG tablet 40 mg at bedtime. Via PEG-tube   ? haloperidoL (HALDOL) 5 MG tablet 5 mg by G-tube route 3 (three) times a day.   ? lisinopriL (PRINIVIL,ZESTRIL) 2.5 MG tablet 1.25 mg daily. Via PEG-tube, hold if SBP <90   ? LORazepam (ATIVAN) 0.5 MG tablet 1 tablet (0.5 mg total) by G-tube route 4 (four) times a day for 3 days.   ? metoprolol tartrate (LOPRESSOR) 25 MG tablet 6.25 mg 2 (two) times a day. Via PEG-tube, hold if SBP <90, HR <60   ? neomycin-bacitracin-polymyxin B (NEOSPORIN) 3.5-400-5,000 mg-unit-unit OiPk ointment Apply 1 application topically 2 (two) times a day.   ? omeprazole (PRILOSEC) 2 mg/mL SusR suspension 40 mg daily before breakfast. Via PEG-tube   ? PARoxetine (PAXIL) 20 MG tablet 20 mg by G-tube route every morning.   ? QUEtiapine (SEROQUEL) 25 MG tablet 50 mg 3 (three) times a day. Via PEG-tube give 25 mg am/pm with 12.5 mg mid day due to increased agitation   ? ticagrelor (BRILINTA) 90 mg Tab 90 mg 2 (two) times a day. Via PEG-tube   ?  traZODone (DESYREL) 50 MG tablet 50 mg at bedtime. Via PEG-tube   ? white petrolatum (AQUAPHOR ORIGINAL) 41 % Oint Apply 1 application topically 2 (two) times a day. Apply to feet/legs         REVIEW OF SYSTEMS:  Unable to verbalize due to aphasia    PHYSICAL EXAMINATION:  Vitals:    12/23/20 0612   BP: 129/66   Pulse: 66   Temp: 99.1  F (37.3  C)   Weight: 157 lb 3.2 oz (71.3 kg)         GENERAL: Does not follow simple commands, makes eye contact, non verbal.  HEENT: Head is normocephalic with normal hair distribution. No evidence of trauma.Oral: reddened wth white coating on tongue left inner cheek Ears: No acute purulent discharge. Eyes: Conjunctivae pink with no scleral jaundice. Nose: Normal mucosa and septum. NECK: Supple with no cervical or supraclavicular lymphadenopathy. Trachea is midline, healing trach stomaLungs : He is CTA  EXTREMITIES: Atraumatic. Full range of motion on both upper and lower extremities, there is no tenderness to palpation, no pedal edema, no cyanosis or clubbing, no calf tenderness, normal cap refill, no joint swelling.  SKIN: Warm and dry, no erythema noted, abdomen with g tube  NEUROLOGICAL: The patient is oriented to person, TBI    Social Distancing with PPE practiced due to Covid 19    LABS:    Lab Results   Component Value Date    WBC 10.6 12/08/2020    HGB 13.7 (L) 12/08/2020    HCT 43.8 12/08/2020    MCV 94 12/08/2020     12/08/2020       Results for orders placed or performed in visit on 12/08/20   Basic Metabolic Panel   Result Value Ref Range    Sodium 149 (H) 136 - 145 mmol/L    Potassium 3.8 3.5 - 5.0 mmol/L    Chloride 112 (H) 98 - 107 mmol/L    CO2 25 22 - 31 mmol/L    Anion Gap, Calculation 12 5 - 18 mmol/L    Glucose 137 (H) 70 - 125 mg/dL    Calcium 8.8 8.5 - 10.5 mg/dL    BUN 23 (H) 8 - 22 mg/dL    Creatinine 0.74 0.70 - 1.30 mg/dL    GFR MDRD Af Amer >60 >60 mL/min/1.73m2    GFR MDRD Non Af Amer >60 >60 mL/min/1.73m2           No results found for: HGBA1C  No  results found for: [id number]  No results found for: [id number]    ASSESSMENT/PLAN:  1. Hypotension due to drugs    2. Hypoxic brain damage (H)      1. Hypotension: On Metaprel 6.25, we are using this for pulse regulation as well, BP is a bit lower its systolically 96 but ok to give as pulse at 114. He will be monitored by sn . Non ambulatory, in a wc.    2. Hypoxic Brain injury: Scot is in a semi vegative state, med changes made in acute care and  is less aggressive now. Total dependance on staff for all ADL and med/tube management.  Pallative consult with [facility] in place. Rounding MD did decrease his Depakote but remains calm and minimally responsive.       Electronically signed by:  Fouzia High CNP  This progress note was completed using Dragon software and there may be grammatical errors.

## 2021-06-21 NOTE — LETTER
Letter by Fouzia High CNP at      Author: Fouzia High CNP Service: -- Author Type: --    Filed:  Encounter Date: 2020 Status: (Other)         Patient: Scot Bishop   MR Number: [id number]   YOB: 1980   Date of Visit: 2020       [facility]      NAME:  Scot Bishop             :  1980    MRN: [id number]    CODE STATUS: DNR    FACILITY: [facility] [[id number]]       CHIEF COMPLAIN/REASON FOR VISIT:  Chief Complaint   Patient presents with   ? Problem Visit     temp       HISTORY OF PRESENT ILLNESS: Scot Bishop is a 40 y.o. male being seen per nursing request as temp is 100.7 this am. He has had neg ua, neg cxr and negative covid. Suggestive of brain stem deregulation. Nursing reports he is back from the hospital after an extensive 3-week stay, he had aggressive behaviors here on the TCU and was sent out and was followed by psychiatric care at [facility].  He is a TBI and seen in his room this a.m. nonverbal he has a history of aphasia very calm poor eye contact.  [facility] [place] [facility] [place] May 2020. At that juncture he was in cardiac cath lab and suffered a hypoxic brain injury, high temp and thrombotic event. He is seen in his room today. Non verbal and did not track with his eyes. He will make eye contact. He has repetitive movement while in bed.He has repetitive laughing today. He varies between crying and laughing per nursing.  Moving legs and arms. Per nursing he  Slid from bed over week end, no apparent injuries, they are placing a scoop mattress for preventive falls. . He will need ongoing rehab services with PT/OT/ST. Has Gtube due to his dysphagia, which reviewed that weight has been stable.. Unfortunealy due to Scot Hypoxic brain injury he will not be able to understand hygiene education and nursing will need to meet these needs for pt. Therapy continues to work with him, seen ambualting  with SBA two.Staff to anticipate needs and provide good elizabeth care two times a day and prn.   continues to work with family towards DC to a group home after TCU stay as patient will be unable to care for self, remains dependant on staff for all ADL and mobility as well as GTube for feedings, he also has an order for puréed diet level 4 with thick nectar liquids.  However at this juncture we will look at the tube feeding this is more pleasure feedings and if he gets aggressive he could aspirate. Pt did get a work up r/t temp with abnormal labs, he is pallative care and we will have follow-up labs on 12/8/20.      . No Known Allergies:     Current Outpatient Medications   Medication Sig   ? acetaminophen (TYLENOL) 650 mg/20.3 mL Soln 650 mg every 6 (six) hours as needed. Via PEG-tube    ? amantadine HCL (SYMMETREL) 50 mg/5 mL solution 100 mg every 12 (twelve) hours. Via PEG-tube   ? aspirin 81 MG EC tablet 81 mg daily. Via PEG-tube   ? atorvastatin (LIPITOR) 40 MG tablet 40 mg at bedtime. Via PEG-tube   ? haloperidoL (HALDOL) 5 MG tablet 5 mg by G-tube route 3 (three) times a day.   ? lisinopriL (PRINIVIL,ZESTRIL) 2.5 MG tablet 1.25 mg daily. Via PEG-tube, hold if SBP <90   ? LORazepam (ATIVAN) 0.5 MG tablet 1 tablet (0.5 mg total) by G-tube route 4 (four) times a day for 3 days.   ? metoprolol tartrate (LOPRESSOR) 25 MG tablet 6.25 mg 2 (two) times a day. Via PEG-tube, hold if SBP <90, HR <60   ? neomycin-bacitracin-polymyxin B (NEOSPORIN) 3.5-400-5,000 mg-unit-unit OiPk ointment Apply 1 application topically 2 (two) times a day.   ? omeprazole (PRILOSEC) 2 mg/mL SusR suspension 40 mg daily before breakfast. Via PEG-tube   ? PARoxetine (PAXIL) 20 MG tablet 20 mg by G-tube route every morning.   ? QUEtiapine (SEROQUEL) 25 MG tablet 50 mg 3 (three) times a day. Via PEG-tube give 25 mg am/pm with 12.5 mg mid day due to increased agitation   ? ticagrelor (BRILINTA) 90 mg Tab 90 mg 2 (two) times a day. Via  PEG-tube   ? traZODone (DESYREL) 50 MG tablet 50 mg at bedtime. Via PEG-tube   ? white petrolatum (AQUAPHOR ORIGINAL) 41 % Oint Apply 1 application topically 2 (two) times a day. Apply to feet/legs         REVIEW OF SYSTEMS:  Unable to verbalize due to aphasia    PHYSICAL EXAMINATION:  Vitals:    12/08/20 0734   BP: 100/73   Pulse: 99   Temp: (!) 100.7  F (38.2  C)   Weight: 158 lb (71.7 kg)         GENERAL: Does not follow simple commands, makes eye contact, non verbal.  HEENT: Head is normocephalic with normal hair distribution. No evidence of trauma. Ears: No acute purulent discharge. Eyes: Conjunctivae pink with no scleral jaundice. Nose: Normal mucosa and septum. NECK: Supple with no cervical or supraclavicular lymphadenopathy. Trachea is midline, healing trach stoma  Lungs : He is CTA  EXTREMITIES: Atraumatic. Full range of motion on both upper and lower extremities, there is no tenderness to palpation, no pedal edema, no cyanosis or clubbing, no calf tenderness, normal cap refill, no joint swelling.  SKIN: Warm and dry, no erythema noted, abdomen with g tube  NEUROLOGICAL: The patient is oriented to person, TBI    Social Distancing with PPE practiced due to Covid 19    LABS:    Lab Results   Component Value Date    WBC 17.0 (H) 12/01/2020    HGB 15.9 12/01/2020    HCT 52.2 12/01/2020    MCV 95 12/01/2020     12/01/2020       Results for orders placed or performed in visit on 12/03/20   Basic Metabolic Panel   Result Value Ref Range    Sodium 152 (HH) 136 - 145 mmol/L    Potassium 3.9 3.5 - 5.0 mmol/L    Chloride 114 (H) 98 - 107 mmol/L    CO2 27 22 - 31 mmol/L    Anion Gap, Calculation 11 5 - 18 mmol/L    Glucose 135 (H) 70 - 125 mg/dL    Calcium 9.8 8.5 - 10.5 mg/dL    BUN 39 (H) 8 - 22 mg/dL    Creatinine 0.91 0.70 - 1.30 mg/dL    GFR MDRD Af Amer >60 >60 mL/min/1.73m2    GFR MDRD Non Af Amer >60 >60 mL/min/1.73m2           No results found for: HGBA1C  No results found for: [id number]  No results  found for: [id number]    ASSESSMENT/PLAN:  1. Fever, unspecified fever cause    2. Hypoxic brain injury (H)        1. Fever: Covid, CXR, UA negative. Follow-up CBC, HEP panel and BMP in am. Give tylenol prn for antipyretic    2. Hypoxic Brain injury: Scot is in a semi vegative state, med changes made in acute care and  is less aggressive now. Total dependance on staff for all ADL and med/tube management.  Pallative consult with [facility] in place           Electronically signed by:  Fouzia High CNP  This progress note was completed using Dragon software and there may be grammatical errors.

## 2021-06-21 NOTE — LETTER
Letter by Cortney Bahena MBBS at      Author: Cortney Bahena MBBS Service: -- Author Type: --    Filed:  Encounter Date: 1/5/2021 Status: (Other)         Patient: Scot Bishop   MR Number: [id number]   YOB: 1980   Date of Visit: 1/5/2021       [facility] Admission note      Patient: Scot Bishop  MRN: [id number]        [facility] [[id number]]  Reason for Visit     Chief Complaint   Patient presents with   ? Problem Visit   Evaluated for review of his psychotropic medications1    Code Status     dnr /comfort foccused    Assessment     - =s/p anoxic brain injury   - Dysphagia on TF  -- long hospitalization with agitated /aggressive behavoir  Patient on multiple psychotropic meds.  -Hypotension.  - FTT    Plan     Patient has been readmitted to the TCU  Seen at the request of the  who is requesting medication reduction at the request of family.  Care plan was reviewed with staff also recently Depakote dosage was reduced.  Staff is reporting that he is alert and oriented.  He has underlying history of anoxic brain damage but has been interactive he has been noticed to be sitting in the Broda chair no behaviors reported.  They do not report that is excessively sedated.  Recheck BMP overall shows an improvement  Care plan reviewed and at present we will continue with him the with no changes in his psychotropic medications.  He has had a prolonged and lengthy stay in the hospital because of excessive aggressive and difficult behaviors.  We will reassess again at MY NEXT VISIT      History     Patient is a very pleasant 40 y.o. male who is readmitted to TCU  Pt  Is admitted to behavioral health unit due to aggressive behaviors; with hitting staff and difficulty to care  Various interventions were tried and were unsuccessful and eventually patient was started on psychotropic meds.  Eventually his behaviors escalated and he had been sent to the behavioral health  unit he comes back on high doses of medication including Seroquel trazodone.  Recently Depakote was reduced to 3 times a day with improvement noted.  Today was sitting in a Broda chair.  He was noted to be interacting somewhat and watching TV staff has reported that he is quite conversant also occasionally.  However no worsening behaviors reported  At baseline nonambulatory and requires a Broda chair he is an assist of all cares.  In addition he has chronically low blood pressures which are felt to be secondary to his autonomic instability.  He continues to require tube feeding.  At present he is not able to self initiate eating    Past Medical History     Active Ambulatory (Non-Hospital) Problems    Diagnosis   ? Sedated   ? Thrush, oral   ? Fever   ? Aggression   ? Agitation   ? Low BP   ? G tube feedings (H)   ? Yeast infection   ? Impetigo any site   ? Hypoxic brain damage (H)   ? Acute respiratory failure with hypoxia (H)   ? Gastrojejunostomy tube status (H)   ? Hypoxic brain injury (H)   ? Dysphagia   ? Cardiac arrest (H)   ? Primary Lymphadenitis   ? Nicotine Dependence   ? Backache     Past Medical History:   Diagnosis Date   ? Acute respiratory failure with hypoxia (H)    ? Aggression 11/09/2020   ? Agitation 09/21/2020   ? LOWELL (acute kidney injury) (H)    ? Back injury, sequela 10/27/2017   ? Cardiac arrest (H)    ? Cardiac arrest (H) 05/17/2020   ? Cognitive disorder 11/11/2020   ? Dysphagia    ? Dysphagia 11/11/2020   ? Gastrojejunostomy tube status (H) 11/11/2020   ? HTN (hypertension)    ? Hypoxic ischemic encephalopathy    ? Low blood pressure 09/17/2020   ? Nonverbal 11/11/2020   ? NSTEMI (non-ST elevated myocardial infarction) (H)    ? TBI (traumatic brain injury) (H) 06/25/2020       Past Social History     Reviewed, and he  reports that he has been smoking. He has been smoking about 1.00 pack per day. He has never used smokeless tobacco. He reports current alcohol use. He reports that he does not  use drugs.    Family History     Reviewed, and family history includes Cancer in his maternal grandfather; Diabetes in his maternal aunt; Diabetes type I in his maternal aunt; Lung cancer in his maternal grandfather; Other in his father.    Medication List   Post Discharge Medication Reconciliation Status:    QUEtiapine (SEROQUEL) 50 mg tablet    Indications: Aggression Administer 1 tablet via G-tube 3 times daily.   0 11/27/2020     haloperidoL (HALDOL) 5 mg tablet    Indications: Aggression Administer 1 tablet via G-tube 3 times daily.   0 11/27/2020     divalproex sprinkles (DEPAKOTE SPRINKLES) 125 mg capsule    Indications: Aggression 4 capsules 2 times daily. G tube   0 11/27/2020     traZODone (DESYREL) 50 mg tablet    Indications: Aggression Administer 1 tablet via G-tube at bedtime.   0 11/27/2020     potassium chloride (K-APOLLO) 20 mEq packet    Indications: Dehydration 1 Packet once daily with a meal. G-tube   0 11/27/2020     omeprazole (PRILOSEC) 2 mg/mL susp oral suspension    Indications: Dysphagia, unspecified type Administer 20 mL via G-tube once daily.   0 11/27/2020     metoprolol tartrate (LOPRESSOR) 25 mg tablet    Indications: Cardiac arrest (HC) Administer 0.25 tablets via G-tube 2 times daily.   0 11/27/2020     acetaminophen (TYLENOL) 325 mg tablet    Indications: History of anoxic brain injury Administer 2 tablets via G-tube every 6 hours if needed. Max acetaminophen dose: 4000mg in 24 hrs.    0 11/27/2020     ticagrelor (BRILINTA) 90 mg tablet    Indications: Cardiac arrest (HC) Administer 1 tablet via nasogastric tube 2 times daily.   0 11/27/2020     PARoxetine (PAXIL) 20 mg tablet    Indications: Aggression Administer 1 tablet via G-tube once daily.   0 11/27/2020     atorvastatin (LIPITOR) 40 mg tablet    Indications: Cardiac arrest (HC) Administer 1 tablet via G-tube at bedtime.   0 11/27/2020     aspirin chewable 81 mg chewable tablet    Indications: Cardiac arrest (HC) Administer 1  tablet via G-tube once daily.   0 11/27/2020     amantadine HCL (SYMMETREL) 50 mg/5 mL solution    Indications: History of anoxic brain injury Administer 10 mL via G-tube every 12              Allergies     No Known Allergies    Review of Systems   A comprehensive review of 14 systems was done. Pertinent findings noted here and in history of present illness. All the rest negative.  Constitutional: Negative.  Negative for fever, chills, he has activity change, appetite change and fatigue.   HENT: Negative for congestion and facial swelling.    Eyes: Negative for photophobia, redness and visual disturbance.   Respiratory: Negative for cough and chest tightness.    Cardiovascular: Negative for chest pain, palpitations and leg swelling.   Gastrointestinal: Negative for nausea, diarrhea, constipation, blood in stool and abdominal distention.   Has incontinence  Genitourinary: Negative.    Musculoskeletal: Negative.  Remains WC bound  Skin: Negative.    Neurological: Negative for dizziness, tremors, syncope, weakness, light-headedness and headaches.   Hematological: Does not bruise/bleed easily.   Psychiatric/Behavioral: Negative.  Noted to be more awake per staff      Physical Exam     Recent Vitals 12/31/2020   Weight 154 lbs 10 oz   /66   Pulse 100   Temp 98.5   Some recent data might be hidden   t 101  p133 bp 102/65    Constitutional: Oriented to none and appears well-developed.   HEENT:  Normocephalic and atraumatic.  Eyes: Conjunctivae and EOM are normal. Pupils are equal, round, and reactive to light. No discharge.  No scleral icterus. Nose normal. Mouth/Throat: Oropharynx is clear and moist. No oropharyngeal exudate.    NECK: Normal range of motion. Neck supple. No JVD present. No tracheal deviation present. No thyromegaly present.   CARDIOVASCULAR: Normal rate, regular rhythm and intact distal pulses.  Exam reveals no gallop and no friction rub.  Systolic murmur present.  PULMONARY: Effort normal and  breath sounds normal. No respiratory distress.No Wheezing or rales.  ABDOMEN: Soft. Bowel sounds are normal. No distension and no mass.  There is no tenderness. There is no rebound and no guarding. No HSM.  Peg tube present  Perianal skin intact ; slight excoriation  MUSCULOSKELETAL: Normal range of motion. No edema and no tenderness. Mild kyphosis, no tenderness.  LYMPH NODES: Has no cervical, supraclavicular, axillary and groin adenopathy.   NEUROLOGICAL: Alert and oriented to none. No cranial nerve deficit.  Normal muscle tone. Coordination normal.   GENITOURINARY: Deferred exam.  SKIN: Skin is warm and dry. No rash noted. No erythema. No pallor.   EXTREMITIES: No cyanosis, no clubbing, no edema. No Deformity.  PSYCHIATRIC: Normal mood, affect and behavior. poorly responsive but sitting with his eyes open      Lab Results     Recent Results (from the past 240 hour(s))   COVID-19 Virus PCR MRF    Specimen: Respiratory   Result Value Ref Range    COVID-19 VIRUS SPECIMEN SOURCE Nares 2019-nCOV Not Detected    COVID-19 Virus PCR MRF    Specimen: Respiratory   Result Value Ref Range    COVID-19 VIRUS SPECIMEN SOURCE Nares     2019-nCOV Not Detected            DARIO Vargas

## 2021-06-21 NOTE — LETTER
Letter by Fouzia High CNP at      Author: Fouzia High CNP Service: -- Author Type: --    Filed:  Encounter Date: 2020 Status: (Other)         Patient: Scot Bishop   MR Number: [id number]   YOB: 1980   Date of Visit: 2020       [facility]      NAME:  Scot Bishop             :  1980    MRN: [id number]    CODE STATUS: DNR    FACILITY: [facility] [[id number]]       CHIEF COMPLAIN/REASON FOR VISIT:  Chief Complaint   Patient presents with   ? Problem Visit     thrush       HISTORY OF PRESENT ILLNESS: Scot Bishop is a 40 y.o. male being seen perST as she felt he has thrush. Assessment difficult due to brain injury but what isseem is some white patches on toungue. . Nursing reports he is back from the hospital after an extensive 3-week stay, he had aggressive behaviors here on the TCU and was sent out and was followed by psychiatric care at [facility].  He is a TBI and seen in his room this a.m. nonverbal he has a history of aphasia very calm poor eye contact.  [place] to [place] in May 2020. At that juncture he was in cardiac cath lab and suffered a hypoxic brain injury, high temp and thrombotic event. He is seen in his room today. Non verbal and did not track with his eyes. He will make eye contact. H. He will need ongoing rehab services with PT/OT/ST. Has Gtube due to his dysphagia,  Unfortunealy due to Scot Hypoxic brain injury he will not be able to understand hygiene education and nursing will need to meet these needs for pt. Therapy continues to work with him, seen ambualting with SBA two.Staff to anticipate needs and provide good elizabeth care two times a day and prn.   continues to work with family towards DC to a group home after TCU stay as patient will be unable to care for self, remains dependant on staff for all ADL and mobility as well as GTube for feedings, he also has an  order for puréed diet level 4 with thick nectar liquids.  However at this juncture we will look at the tube feeding this is more pleasure feedings and if he gets aggressive he could aspirate. Pt did get a work up r/t temp with abnormal labs, he is pallative care and we will have follow-up labs on 12/10/20.      . No Known Allergies:     Current Outpatient Medications   Medication Sig   ? acetaminophen (TYLENOL) 650 mg/20.3 mL Soln 650 mg every 6 (six) hours as needed. Via PEG-tube    ? amantadine HCL (SYMMETREL) 50 mg/5 mL solution 100 mg every 12 (twelve) hours. Via PEG-tube   ? aspirin 81 MG EC tablet 81 mg daily. Via PEG-tube   ? atorvastatin (LIPITOR) 40 MG tablet 40 mg at bedtime. Via PEG-tube   ? haloperidoL (HALDOL) 5 MG tablet 5 mg by G-tube route 3 (three) times a day.   ? lisinopriL (PRINIVIL,ZESTRIL) 2.5 MG tablet 1.25 mg daily. Via PEG-tube, hold if SBP <90   ? LORazepam (ATIVAN) 0.5 MG tablet 1 tablet (0.5 mg total) by G-tube route 4 (four) times a day for 3 days.   ? metoprolol tartrate (LOPRESSOR) 25 MG tablet 6.25 mg 2 (two) times a day. Via PEG-tube, hold if SBP <90, HR <60   ? neomycin-bacitracin-polymyxin B (NEOSPORIN) 3.5-400-5,000 mg-unit-unit OiPk ointment Apply 1 application topically 2 (two) times a day.   ? omeprazole (PRILOSEC) 2 mg/mL SusR suspension 40 mg daily before breakfast. Via PEG-tube   ? PARoxetine (PAXIL) 20 MG tablet 20 mg by G-tube route every morning.   ? QUEtiapine (SEROQUEL) 25 MG tablet 50 mg 3 (three) times a day. Via PEG-tube give 25 mg am/pm with 12.5 mg mid day due to increased agitation   ? ticagrelor (BRILINTA) 90 mg Tab 90 mg 2 (two) times a day. Via PEG-tube   ? traZODone (DESYREL) 50 MG tablet 50 mg at bedtime. Via PEG-tube   ? white petrolatum (AQUAPHOR ORIGINAL) 41 % Oint Apply 1 application topically 2 (two) times a day. Apply to feet/legs         REVIEW OF SYSTEMS:  Unable to verbalize due to aphasia    PHYSICAL EXAMINATION:  Vitals:    12/09/20 1907   BP:  97/58   Pulse: (!) 110   Temp: 99.7  F (37.6  C)   Weight: 159 lb (72.1 kg)         GENERAL: Does not follow simple commands, makes eye contact, non verbal.  HEENT: Head is normocephalic with normal hair distribution. No evidence of trauma.Oral: reddened wth white coating on tongue left inner cheek Ears: No acute purulent discharge. Eyes: Conjunctivae pink with no scleral jaundice. Nose: Normal mucosa and septum. NECK: Supple with no cervical or supraclavicular lymphadenopathy. Trachea is midline, healing trach stomaLungs : He is CTA  EXTREMITIES: Atraumatic. Full range of motion on both upper and lower extremities, there is no tenderness to palpation, no pedal edema, no cyanosis or clubbing, no calf tenderness, normal cap refill, no joint swelling.  SKIN: Warm and dry, no erythema noted, abdomen with g tube  NEUROLOGICAL: The patient is oriented to person, TBI    Social Distancing with PPE practiced due to Covid 19    LABS:    Lab Results   Component Value Date    WBC 10.6 12/08/2020    HGB 13.7 (L) 12/08/2020    HCT 43.8 12/08/2020    MCV 94 12/08/2020     12/08/2020       Results for orders placed or performed in visit on 12/08/20   Basic Metabolic Panel   Result Value Ref Range    Sodium 149 (H) 136 - 145 mmol/L    Potassium 3.8 3.5 - 5.0 mmol/L    Chloride 112 (H) 98 - 107 mmol/L    CO2 25 22 - 31 mmol/L    Anion Gap, Calculation 12 5 - 18 mmol/L    Glucose 137 (H) 70 - 125 mg/dL    Calcium 8.8 8.5 - 10.5 mg/dL    BUN 23 (H) 8 - 22 mg/dL    Creatinine 0.74 0.70 - 1.30 mg/dL    GFR MDRD Af Amer >60 >60 mL/min/1.73m2    GFR MDRD Non Af Amer >60 >60 mL/min/1.73m2           No results found for: HGBA1C  No results found for: YXARFXNK42OB  No results found for: LLOTVNNL18    ASSESSMENT/PLAN:  1. Hypoxic brain injury (H)    2. Thrush, oral        1.. Hypoxic Brain injury: Scot is in a semi vegative state, med changes made in acute care and  is less aggressive now. Total dependance on staff for all ADL and  med/tube management.  Pallative consult with [facility] in place    2. Oral thrush: Do to high risk aspiration I feel we will not be able to use nystatin swish/swallow as he is unabe to comprehend to spit. Give Diflucan 150 mg gt x 1 dose           Electronically signed by:  Fouzia High CNP  This progress note was completed using Dragon software and there may be grammatical errors.

## 2021-06-21 NOTE — LETTER
Letter by Fouzia High CNP at      Author: Fouzia High CNP Service: -- Author Type: --    Filed:  Encounter Date: 10/26/2020 Status: (Other)         Patient: Scot Bishop   MR Number: [id number]   YOB: 1980   Date of Visit: 10/26/2020       [facility]      NAME:  Scot Bishop             :  1980    MRN: [id number]    CODE STATUS:  Full    FACILITY: [facility] [[id number]]       CHIEF COMPLAIN/REASON FOR VISIT:  Chief Complaint   Patient presents with   ? Problem Visit     med review       HISTORY OF PRESENT ILLNESS: Scot Bishop is a 40 y.o. male being seen for review of multiple medical conditions .Nursing request med review. Still continues with prn seroquel and scheduled. We will discontinue the prn dose. Also on k+  so we will have a BMP drawn.ST has started supervised soft food feedings, and they will increase food textures. Nursing reports ongoing agitation, and resistive with cares due to his brain injury. .   [place]emi in May 2020. At that juncture he was in cardiac cath lab and suffered a hypoxic brain injury, high temp and thrombotic event. He is seen in his room today. Non verbal and did not track with his eyes. He will make eye contact. He has repetitive movement while in bed.He has repetitive laughing today. He varies between crying and laughing per nursing.  Moving legs and arms. Per nursing he  Slid from bed over week end, no apparent injuries, they are placing a scoop mattress for preventive falls. . He will need ongoing rehab services with PT/OT/ST. Has Gtube due to his dysphagia, which reviewed that weight has been stable.. Unfortunealy due to Scot Hypoxic brain injury he will not be able to understand hygiene education and nursing will need to meet these needs for pt. Therapy continues to work with him, seen ambualting with SBA two.Staff to anticipate needs and provide good elizabeth  care two times a day and prn.   continues to work with family towards DC to a group home after TCU stay as patient will be unable to care for self, remains dependant on staff for all ADL and mobility as well as GTube for feedings.Wt stable but he needs frequent 1 to 1 when up out of bed .  Nursing reports ongoing agitation, prn.      . No Known Allergies:     Current Outpatient Medications   Medication Sig   ? acetaminophen (TYLENOL) 650 mg/20.3 mL Soln 650 mg every 6 (six) hours as needed. Via PEG-tube    ? amantadine HCL (SYMMETREL) 50 mg/5 mL solution 100 mg every 12 (twelve) hours. Via PEG-tube   ? aspirin 81 MG EC tablet 81 mg daily. Via PEG-tube   ? atorvastatin (LIPITOR) 40 MG tablet 40 mg at bedtime. Via PEG-tube   ? bromocriptine (PARLODEL) 2.5 mg tablet 2.5 mg 2 (two) times a day. Via PEG-tube   ? chlorhexidine (PERIDEX) 0.12 % solution Apply 15 mL to the mouth or throat 4 (four) times a day.   ? levETIRAcetam (KEPPRA) 100 mg/mL solution 1,000 mg 2 (two) times a day. Via PEG-tube   ? lisinopriL (PRINIVIL,ZESTRIL) 2.5 MG tablet 1.25 mg daily. Via PEG-tube, hold if SBP <90   ? LORazepam (ATIVAN) 0.5 MG tablet 1 tablet (0.5 mg total) by G-tube route 4 (four) times a day for 3 days.   ? metoclopramide (REGLAN) 5 MG tablet 5 mg 2 (two) times a day. Via PEG-tube   ? metoprolol tartrate (LOPRESSOR) 25 MG tablet 6.25 mg 2 (two) times a day. Via PEG-tube, hold if SBP <90, HR <60   ? neomycin-bacitracin-polymyxin B (NEOSPORIN) 3.5-400-5,000 mg-unit-unit OiPk ointment Apply 1 application topically 2 (two) times a day.   ? omeprazole (PRILOSEC) 2 mg/mL SusR suspension 40 mg daily before breakfast. Via PEG-tube   ? pantoprazole (PROTONIX) 40 MG tablet 40 mg daily. Via PEG-tube   ? potassium bicarb-citric acid 20 mEq TbEF 20 mEq daily. Via PEG-tube   ? QUEtiapine (SEROQUEL) 25 MG tablet 25 mg 2 (two) times a day. Via PEG-tube give 25 mg am/pm with 12.5 mg mid day due to increased agitation   ? ticagrelor  (BRILINTA) 90 mg Tab 90 mg 2 (two) times a day. Via PEG-tube   ? traZODone (DESYREL) 50 MG tablet 50 mg at bedtime. Via PEG-tube   ? white petrolatum (AQUAPHOR ORIGINAL) 41 % Oint Apply 1 application topically 2 (two) times a day. Apply to feet/legs         REVIEW OF SYSTEMS:  Unable to verbalize due to aphasia    PHYSICAL EXAMINATION:  Vitals:    10/26/20 1450   BP: 98/60   Pulse: 98   Temp: 98.5  F (36.9  C)   Weight: 166 lb (75.3 kg)         GENERAL: Does not follow simple commands, makes eye contact, non verbal.  HEENT: Head is normocephalic with normal hair distribution. No evidence of trauma. Ears: No acute purulent discharge. Eyes: Conjunctivae pink with no scleral jaundice. Nose: Normal mucosa and septum. NECK: Supple with no cervical or supraclavicular lymphadenopathy. Trachea is midline, healing trach stoma  Lungs : He is CTA  EXTREMITIES: Atraumatic. Full range of motion on both upper and lower extremities, there is no tenderness to palpation, no pedal edema, no cyanosis or clubbing, no calf tenderness, normal cap refill, no joint swelling.  SKIN: Warm and dry, no erythema noted, abdomen with g tube  NEUROLOGICAL: The patient is oriented to person, TBI  Social Distancing with PPE practiced due to Covid 19    LABS:    Lab Results   Component Value Date    WBC 8.3 08/06/2020    HGB 11.0 (L) 08/06/2020    HCT 34.9 (L) 08/06/2020    MCV 90 08/06/2020     08/06/2020       Results for orders placed or performed in visit on 10/05/20   Basic Metabolic Panel   Result Value Ref Range    Sodium 141 136 - 145 mmol/L    Potassium 4.2 3.5 - 5.0 mmol/L    Chloride 104 98 - 107 mmol/L    CO2 26 22 - 31 mmol/L    Anion Gap, Calculation 11 5 - 18 mmol/L    Glucose 106 70 - 125 mg/dL    Calcium 9.7 8.5 - 10.5 mg/dL    BUN 13 8 - 22 mg/dL    Creatinine 0.83 0.70 - 1.30 mg/dL    GFR MDRD Af Amer >60 >60 mL/min/1.73m2    GFR MDRD Non Af Amer >60 >60 mL/min/1.73m2           No results found for: HGBA1C  No results found  for: STALJFMC30II  No results found for: XHSHSOCE13    ASSESSMENT/PLAN:  1. G tube feedings (H)    2. Hypoxic brain damage (H)      1.. Dysphagia/Gtube : Has  Started  soft food feedings. Remains on TFing eneteral for nutrition, bolus feedings , all meds via gtube. Nursing will need strict VS and Lung assessment QS and we will stop in the event he does not tolerate well.Lungs clear and no cough.  He continues to do well. ST reports will be starting new texture foods this week,he has been tolerating oral feedings with enterals.He remains on Potassium, we will check his labs for BMP today.    2. Hypoxic Brain injury: Scot has plateau at current level. He has scheduled  Seroquel due to agitation. discontinue prn Seroquel today. Has aggressive behaviors requiring 1 to 1 services at times.     Electronically signed by:  Fouzia High CNP  This progress note was completed using Dragon software and there may be grammatical errors.

## 2021-06-21 NOTE — LETTER
Letter by Cortney Bahena MBBS at      Author: Cortney Bahena MBBS Service: -- Author Type: --    Filed:  Encounter Date: 1/21/2021 Status: (Other)         [facility]  [address] [address]                                  January 21, 2021    Patient: Scot Bishop   MR Number: [id number]   YOB: 1980   Date of Visit: 1/21/2021     Dear Dr. Gan:    Thank you for referring Scot Bishop to me for evaluation. Below are the relevant portions of my assessment and plan of care.    If you have questions, please do not hesitate to call me. I look forward to following Scot along with you.    Sincerely,        DARIO Vargas          CC  No Recipients  Cortney Bahena MBBS  1/21/2021  5:28 PM  [facility] Admission note      Patient: Scot Bishop  MRN: [id number]        [facility] [[id number]]  Reason for Visit     Chief Complaint   Patient presents with   ? Follow Up   Evaluated for review of his psychotropic medications1    Code Status     dnr /comfort foccused    Assessment     - s/p anoxic brain injury   - Dysphagia on TF  -- long hospitalization with agitated /aggressive behavoir  Patient on multiple psychotropic meds.  -Hypotension.  - FTT    Plan     Patient has been readmitted to the TCU  He remains bed/WC bound  Mood and behaviors reviewed with staff.  He continues to have intermittent agitation.  Overall he has been stable with more alertness noted but does not appear to be interacting with his surroundings.  He remains nonverbal  He remains noncommunicative due to his anoxic brain injury  He is incontinent and requires complete assistance with all his ADLs.  Blood pressures and temperatures continue to fluctuate widely due to autonomic insufficiency.  He is currently requiring complete assistance with all his ADLs with no improvement family and staff is looking at group home placement    History     Patient is a very  pleasant 40 y.o. male who is readmitted to TCU  Pt  Is admitted to behavioral health unit due to aggressive behaviors; with hitting staff and difficulty to care  Various interventions were tried and were unsuccessful and eventually patient was started on psychotropic meds.  Eventually his behaviors escalated and he had been sent to the behavioral health unit he comes back on high doses of medication including Seroquel trazodone.  Recently Depakote was reduced to 3 times a day with improvement noted.  Today was sitting in a Broda chair.  Since then he has become more alert.  However he continues to have intermittent episodes of agitation.  Staff do not want any discontinuation of his psychotropic medications as they believe that his behaviors can escalate easily and he can be very difficult to redirect  He is hard to redirect and can get quite combative.  Recently he was sent back to the emergency room after he pulled his G-tube out this was replaced in the emergency room.  Since then he has tolerated his tube feeding well  This was replaced.  He also was noted to have behaviors including picking and scratching at his skin repetitively he is hard to redirect  He was noted to be talking to staff but his interaction at baseline remains poor.  He does not make any eye contact.  He was noted to be scratching and picking at his skin from which he is hard to redirect he has been given various sensory tools to keep him engaged.  At baseline nonambulatory and requires a Broda chair he is an assist of all cares.  In addition he has chronically low blood pressures which are felt to be secondary to his autonomic instability.  Blood pressures continue to fluctuate widely  He continues to require tube feeding.  At present he is not able to self initiate eating.  He also has low blood pressures with intermittent low-grade temperatures noted work-up in the past has been negative including extensive imaging.  It is felt secondary to  autonomic instability  Blood pressures and vitals remain labile weights have been stable recently  Past Medical History     Active Ambulatory (Non-Hospital) Problems    Diagnosis   ? Sedated   ? Thrush, oral   ? Fever   ? Aggression   ? Agitation   ? Low BP   ? G tube feedings (H)   ? Yeast infection   ? Impetigo any site   ? Hypoxic brain damage (H)   ? Acute respiratory failure with hypoxia (H)   ? Gastrojejunostomy tube status (H)   ? Hypoxic brain injury (H)   ? Dysphagia   ? Cardiac arrest (H)   ? Primary Lymphadenitis   ? Nicotine Dependence   ? Backache     Past Medical History:   Diagnosis Date   ? Acute respiratory failure with hypoxia (H)    ? Aggression 11/09/2020   ? Agitation 09/21/2020   ? LOWELL (acute kidney injury) (H)    ? Back injury, sequela 10/27/2017   ? Cardiac arrest (H)    ? Cardiac arrest (H) 05/17/2020   ? Cognitive disorder 11/11/2020   ? Dysphagia    ? Dysphagia 11/11/2020   ? Gastrojejunostomy tube status (H) 11/11/2020   ? HTN (hypertension)    ? Hypoxic ischemic encephalopathy    ? Low blood pressure 09/17/2020   ? Nonverbal 11/11/2020   ? NSTEMI (non-ST elevated myocardial infarction) (H)    ? TBI (traumatic brain injury) (H) 06/25/2020       Past Social History     Reviewed, and he  reports that he has been smoking. He has been smoking about 1.00 pack per day. He has never used smokeless tobacco. He reports current alcohol use. He reports that he does not use drugs.    Family History     Reviewed, and family history includes Cancer in his maternal grandfather; Diabetes in his maternal aunt; Diabetes type I in his maternal aunt; Lung cancer in his maternal grandfather; Other in his father.    Medication List   Post Discharge Medication Reconciliation Status:    QUEtiapine (SEROQUEL) 50 mg tablet    Indications: Aggression Administer 1 tablet via G-tube 3 times daily.   0 11/27/2020     haloperidoL (HALDOL) 5 mg tablet    Indications: Aggression Administer 1 tablet via G-tube 3 times  daily.   0 11/27/2020     divalproex sprinkles (DEPAKOTE SPRINKLES) 125 mg capsule    Indications: Aggression 4 capsules 2 times daily. G tube   0 11/27/2020     traZODone (DESYREL) 50 mg tablet    Indications: Aggression Administer 1 tablet via G-tube at bedtime.   0 11/27/2020     potassium chloride (K-APOLLO) 20 mEq packet    Indications: Dehydration 1 Packet once daily with a meal. G-tube   0 11/27/2020     omeprazole (PRILOSEC) 2 mg/mL susp oral suspension    Indications: Dysphagia, unspecified type Administer 20 mL via G-tube once daily.   0 11/27/2020     metoprolol tartrate (LOPRESSOR) 25 mg tablet    Indications: Cardiac arrest (HC) Administer 0.25 tablets via G-tube 2 times daily.   0 11/27/2020     acetaminophen (TYLENOL) 325 mg tablet    Indications: History of anoxic brain injury Administer 2 tablets via G-tube every 6 hours if needed. Max acetaminophen dose: 4000mg in 24 hrs.    0 11/27/2020     ticagrelor (BRILINTA) 90 mg tablet    Indications: Cardiac arrest (HC) Administer 1 tablet via nasogastric tube 2 times daily.   0 11/27/2020     PARoxetine (PAXIL) 20 mg tablet    Indications: Aggression Administer 1 tablet via G-tube once daily.   0 11/27/2020     atorvastatin (LIPITOR) 40 mg tablet    Indications: Cardiac arrest (HC) Administer 1 tablet via G-tube at bedtime.   0 11/27/2020     aspirin chewable 81 mg chewable tablet    Indications: Cardiac arrest (HC) Administer 1 tablet via G-tube once daily.   0 11/27/2020     amantadine HCL (SYMMETREL) 50 mg/5 mL solution    Indications: History of anoxic brain injury Administer 10 mL via G-tube every 12              Allergies     No Known Allergies    Review of Systems   A comprehensive review of 14 systems was done. Pertinent findings noted here and in history of present illness. All the rest negative.  Constitutional: Negative.  Negative for fever, chills, he has activity change, appetite change and fatigue.   HENT: Negative for congestion and facial  swelling.    Eyes: Negative for photophobia, redness and visual disturbance.   Respiratory: Negative for cough and chest tightness.    Cardiovascular: Negative for chest pain, palpitations and leg swelling.   Gastrointestinal: Negative for nausea, diarrhea, constipation, blood in stool and abdominal distention.   Has incontinence  Genitourinary: Negative.    Musculoskeletal: Negative.  Remains WC bound  Skin: Negative.    Neurological: Negative for dizziness, tremors, syncope, weakness, light-headedness and headaches.   Hematological: Does not bruise/bleed easily.   Psychiatric/Behavioral: Negative.  Noted to be more awake per staff      Physical Exam     Recent Vitals 1/21/2021   Weight 148 lbs 6 oz   /76   Pulse 68   Temp 98   Temp src -   SpO2 95   Some recent data might be hidden       Constitutional: Oriented to none and appears well-developed.   HEENT:  Normocephalic and atraumatic.  Eyes: Conjunctivae and EOM are normal. Pupils are equal, round, and reactive to light. No discharge.  No scleral icterus. Nose normal. Mouth/Throat: Oropharynx is clear and moist. No oropharyngeal exudate.    NECK: Normal range of motion. Neck supple. No JVD present. No tracheal deviation present. No thyromegaly present.   CARDIOVASCULAR: Normal rate, regular rhythm and intact distal pulses.  Exam reveals no gallop and no friction rub.  Systolic murmur present.  PULMONARY: Effort normal and breath sounds normal. No respiratory distress.No Wheezing or rales.  ABDOMEN: Soft. Bowel sounds are normal. No distension and no mass.  There is no tenderness. There is no rebound and no guarding. No HSM.  Peg tube present  Perianal skin intact ; slight excoriation  MUSCULOSKELETAL: Normal range of motion. No edema and no tenderness. Mild kyphosis, no tenderness.  LYMPH NODES: Has no cervical, supraclavicular, axillary and groin adenopathy.   NEUROLOGICAL: Alert and oriented to none. No cranial nerve deficit.  Normal muscle tone.  Coordination normal.   aphasic  GENITOURINARY: Deferred exam.  SKIN: Skin is warm and dry. No rash noted. No erythema. No pallor.   EXTREMITIES: No cyanosis, no clubbing, no edema. No Deformity.  PSYCHIATRIC: Normal mood, affect and behavior. poorly responsive but sitting with his eyes open      Lab Results     Recent Results (from the past 240 hour(s))   COVID-19 Virus PCR MRF    Specimen: Respiratory   Result Value Ref Range    COVID-19 VIRUS SPECIMEN SOURCE Nares     2019-nCOV Not Detected            DARIO Vargas

## 2021-06-21 NOTE — LETTER
Letter by Fouzia High CNP at      Author: Fouzia High CNP Service: -- Author Type: --    Filed:  Encounter Date: 2020 Status: (Other)         Patient: Scot Bishop   MR Number: [id number]   YOB: 1980   Date of Visit: 2020       [facility]      NAME:  Scot Bishop             :  1980    MRN: [id number]    CODE STATUS:  Full    FACILITY: [facility] [[id number]]       CHIEF COMPLAIN/REASON FOR VISIT:  Chief Complaint   Patient presents with   ? Problem Visit     agitation       HISTORY OF PRESENT ILLNESS: Scot Bishop is a 40 y.o. male being seen for review of multiple medical conditions . Nursing reports he has had an increased in agitated behaviors. He has ongoing pacing and hard to redirect due to constant motion. He is a TBI. ST has started supervised soft food feedings, and they will increase food textures. Nursing reports ongoing agitation, and resistive with cares due to his brain injury.  [facility] [place] [facility] [place] [facility] in May 2020. At that juncture he was in cardiac cath lab and suffered a hypoxic brain injury, high temp and thrombotic event. He is seen in his room today. Non verbal and did not track with his eyes. He will make eye contact. He has repetitive movement while in bed.He has repetitive laughing today. He varies between crying and laughing per nursing.  Moving legs and arms. Per nursing he  Slid from bed over week end, no apparent injuries, they are placing a scoop mattress for preventive falls. . He will need ongoing rehab services with PT/OT/ST. Has Gtube due to his dysphagia, which reviewed that weight has been stable.. Unfortunealy due to Scot Hypoxic brain injury he will not be able to understand hygiene education and nursing will need to meet these needs for pt. Therapy continues to work with him, seen ambualting with SBA two.Staff to anticipate needs and provide good elizabeth care two times  a day and prn.   continues to work with family towards DC to a group home after TCU stay as patient will be unable to care for self, remains dependant on staff for all ADL and mobility as well as GTube for feedings.Wt stable but he needs frequent 1 to 1 when up out of bed .  Nursing reports ongoing agitation, prn.      . No Known Allergies:     Current Outpatient Medications   Medication Sig   ? acetaminophen (TYLENOL) 650 mg/20.3 mL Soln 650 mg every 6 (six) hours as needed. Via PEG-tube    ? amantadine HCL (SYMMETREL) 50 mg/5 mL solution 100 mg every 12 (twelve) hours. Via PEG-tube   ? aspirin 81 MG EC tablet 81 mg daily. Via PEG-tube   ? atorvastatin (LIPITOR) 40 MG tablet 40 mg at bedtime. Via PEG-tube   ? bromocriptine (PARLODEL) 2.5 mg tablet 2.5 mg 2 (two) times a day. Via PEG-tube   ? chlorhexidine (PERIDEX) 0.12 % solution Apply 15 mL to the mouth or throat 4 (four) times a day.   ? levETIRAcetam (KEPPRA) 100 mg/mL solution 1,000 mg 2 (two) times a day. Via PEG-tube   ? lisinopriL (PRINIVIL,ZESTRIL) 2.5 MG tablet 1.25 mg daily. Via PEG-tube, hold if SBP <90   ? LORazepam (ATIVAN) 0.5 MG tablet 1 tablet (0.5 mg total) by G-tube route 4 (four) times a day for 3 days.   ? metoclopramide (REGLAN) 5 MG tablet 5 mg 2 (two) times a day. Via PEG-tube   ? metoprolol tartrate (LOPRESSOR) 25 MG tablet 6.25 mg 2 (two) times a day. Via PEG-tube, hold if SBP <90, HR <60   ? neomycin-bacitracin-polymyxin B (NEOSPORIN) 3.5-400-5,000 mg-unit-unit OiPk ointment Apply 1 application topically 2 (two) times a day.   ? omeprazole (PRILOSEC) 2 mg/mL SusR suspension 40 mg daily before breakfast. Via PEG-tube   ? pantoprazole (PROTONIX) 40 MG tablet 40 mg daily. Via PEG-tube   ? potassium bicarb-citric acid 20 mEq TbEF 20 mEq daily. Via PEG-tube   ? QUEtiapine (SEROQUEL) 25 MG tablet 25 mg 2 (two) times a day. Via PEG-tube give 25 mg am/pm with 12.5 mg mid day due to increased agitation   ? ticagrelor (BRILINTA) 90 mg  Tab 90 mg 2 (two) times a day. Via PEG-tube   ? traZODone (DESYREL) 50 MG tablet 50 mg at bedtime. Via PEG-tube   ? white petrolatum (AQUAPHOR ORIGINAL) 41 % Oint Apply 1 application topically 2 (two) times a day. Apply to feet/legs         REVIEW OF SYSTEMS:  Unable to verbalize due to aphasia    PHYSICAL EXAMINATION:  Vitals:    11/02/20 1530   BP: 92/58   Pulse: 79   Temp: 97  F (36.1  C)   Weight: 166 lb (75.3 kg)         GENERAL: Does not follow simple commands, makes eye contact, non verbal.  HEENT: Head is normocephalic with normal hair distribution. No evidence of trauma. Ears: No acute purulent discharge. Eyes: Conjunctivae pink with no scleral jaundice. Nose: Normal mucosa and septum. NECK: Supple with no cervical or supraclavicular lymphadenopathy. Trachea is midline, healing trach stoma  Lungs : He is CTA  EXTREMITIES: Atraumatic. Full range of motion on both upper and lower extremities, there is no tenderness to palpation, no pedal edema, no cyanosis or clubbing, no calf tenderness, normal cap refill, no joint swelling.  SKIN: Warm and dry, no erythema noted, abdomen with g tube  NEUROLOGICAL: The patient is oriented to person, TBI  Social Distancing with PPE practiced due to Covid 19    LABS:    Lab Results   Component Value Date    WBC 8.3 08/06/2020    HGB 11.0 (L) 08/06/2020    HCT 34.9 (L) 08/06/2020    MCV 90 08/06/2020     08/06/2020       Results for orders placed or performed in visit on 10/26/20   Basic Metabolic Panel   Result Value Ref Range    Sodium 142 136 - 145 mmol/L    Potassium 4.8 3.5 - 5.0 mmol/L    Chloride 107 98 - 107 mmol/L    CO2 27 22 - 31 mmol/L    Anion Gap, Calculation 8 5 - 18 mmol/L    Glucose 104 70 - 125 mg/dL    Calcium 9.9 8.5 - 10.5 mg/dL    BUN 15 8 - 22 mg/dL    Creatinine 0.82 0.70 - 1.30 mg/dL    GFR MDRD Af Amer >60 >60 mL/min/1.73m2    GFR MDRD Non Af Amer >60 >60 mL/min/1.73m2           No results found for: HGBA1C  No results found for:  MEGQMLBL88FQ  No results found for: SJYBXSLA45    ASSESSMENT/PLAN:  1. Hypoxic brain injury (H)    2. Agitation      1. Hypoxic Brain injury: Scot has plateau at current level. He has scheduled  Seroquel due to agitation. discontinue prn Seroquel today. Has aggressive behaviors requiring 1 to 1 services at times.    2. Agitation: Restless and pacing. Did have nursing give an additional Seroquel due to agitation, on scheduled  seroquel as well.      Electronically signed by:  Fouzia High CNP  This progress note was completed using Dragon software and there may be grammatical errors.

## 2021-06-24 DIAGNOSIS — F34.81 DISRUPTIVE MOOD DYSREGULATION DISORDER (H): ICD-10-CM

## 2021-06-24 RX ORDER — LORAZEPAM 0.5 MG/1
0.5 TABLET ORAL
Qty: 60 TABLET | Refills: 0 | Status: CANCELLED | OUTPATIENT
Start: 2021-06-24

## 2021-06-24 NOTE — TELEPHONE ENCOUNTER
"Date of Last Office Visit: 6-16-21  Date of Next Office Visit: 7-8-21  No shows since last visit: 0  Cancellations since last visit: 0    Medication requested: ativan  Date last ordered: 5-26-21 Qty: 60 Refills: 0     Review of [place] ?: yes  Medication last filled date: *** Qty filled: ***  Other [place] substance on [place] ?: ***  If yes, is this a new medication?: ***  If yes, name of medication: *** and date filled: ***    Lapse in medication adherence greater than 5 days?: ***  If yes, call patient and gather details: ***  Medication refill request verified as identical to current order?: ***  Result of Last DAM, VPA, Li+ Level, CBC, or Carbamazepine Level (at or since last visit): {Blank single:44383::\"Negative\",\"Positive\",\"other\",\"N/A\"}    []Medication refilled per  Medication Refill in Ambulatory Care  policy.  []Medication unable to be refilled by RN due to criteria not met as indicated below:    []Eligibility - not seen in the last year   []Supervision - no future appointment   []Compliance - no shows, cancellations or lapse in therapy   []Verification - order discrepancy   []Controlled medication   []Medication not included in policy   []90-day supply request   []Other  "

## 2021-06-28 DIAGNOSIS — F34.81 DISRUPTIVE MOOD DYSREGULATION DISORDER (H): ICD-10-CM

## 2021-06-28 RX ORDER — LORAZEPAM 0.5 MG/1
0.5 TABLET ORAL
Qty: 60 TABLET | Refills: 0 | Status: SHIPPED | OUTPATIENT
Start: 2021-06-28 | End: 2021-07-27

## 2021-06-28 NOTE — TELEPHONE ENCOUNTER
Date of Last Office Visit: 6/16/21  Date of Next Office Visit: 7/8/21  No shows since last visit: 0  Cancellations since last visit: 0    Medication requested: lorazepam 0.5 mg Date last ordered: 5/26/21 Qty: 60 Refills: 0     Review of [place] ?: Yes  Medication last filled date: 6/1/21 Qty filled: 56  Other controlled substance on [place] ?: No    Lapse in medication adherence greater than 5 days?: No  If yes, call patient and gather details: N/A  Medication refill request verified as identical to current order?: Yes  Result of Last DAM, VPA, Li+ Level, CBC, or Carbamazepine Level (at or since last visit): N/A    []Medication refilled per  Medication Refill in Ambulatory Care  policy.  [x]Medication unable to be refilled by RN due to criteria not met as indicated below:    []Eligibility - not seen in the last year   []Supervision - no future appointment   []Compliance - no shows, cancellations or lapse in therapy   []Verification - order discrepancy   [x]Controlled medication   []Medication not included in policy   []90-day supply request   []Other

## 2021-06-30 NOTE — PROGRESS NOTES
Progress Notes by Fouzia High CNP at 2021  8:36 AM     Author: Fouzia High CNP Service: -- Author Type: Nurse Practitioner    Filed: 2021  8:02 PM Encounter Date: 2021 Status: Signed    : Fouzia High CNP (Nurse Practitioner)       [facility]      NAME:  Scot Bishop             :  1980  MRN: [id number]  CODE STATUS:  DNR/DNI    VISIT TYPE: DISCHARGE SUMMARY  FACILYTY: [facility] [[id number]]                    PRIMARY CARE PROVIDER: Cachorro Morales MD    DISCHARGE DIAGNOSIS:      1. Acute respiratory failure with hypoxia (H)    2. Cardiac arrest (H)    3. Hypoxic brain damage (H)    4. Hypoxic brain injury (H)    5. Dysphagia, unspecified type         DISCHARGE MEDICATIONS:         Medication List          Accurate as of 2021  7:33 PM. If you have any questions, ask your nurse or doctor.            CONTINUE taking these medications    acetaminophen 650 mg/20.3 mL Soln  Commonly known as: TYLENOL     amantadine HCL 50 mg/5 mL solution  Commonly known as: SYMMETREL     Aquaphor OriginaL 41 % Oint  Generic drug: white petrolatum     aspirin 81 MG EC tablet     atorvastatin 40 MG tablet  Commonly known as: LIPITOR     haloperidoL 5 MG tablet  Commonly known as: HALDOL     ISOSOURCE 1.5 TEN ENTERAL TUBE     LORazepam 0.5 MG tablet  Commonly known as: ATIVAN  1 tablet (0.5 mg total) by G-tube route 4 (four) times a day for 3 days.     metoprolol tartrate 25 MG tablet  Commonly known as: LOPRESSOR     nystatin ointment  Commonly known as: MYCOSTATIN     omeprazole 2 mg/mL Susr suspension  Commonly known as: PriLOSEC     PARoxetine 20 MG tablet  Commonly known as: PAXIL     QUEtiapine 25 MG tablet  Commonly known as: SEROquel     ticagrelor 90 mg Tab  Commonly known as: BRILINTA     traZODone 50 MG tablet  Commonly known as: DESYREL     valproate 250 mg/5 mL  Commonly known as: DEPAKENE        STOP taking these medications     lisinopriL 2.5 MG tablet  Commonly known as: PRINIVIL,ZESTRIL  Stopped by: Fouzia High CNP     neomycin-bacitracin-polymyxin B 3.5-400-5,000 mg-unit-unit Oipk ointment  Commonly known as: NEOSPORIN  Stopped by: Fouzia High CNP            HISTORY OF PRESENT ILLNESS: Scot Bishop is a 40 y.o. male being seen today for a face to face visit for an anticipated discontinue home on 2/4/21 to a group home. Patient comes from [facility] after  a NStemi in May 2020. At that juncture he was in cardiac cath lab and suffered a hypoxic brain injury, high temp and thrombotic event. He is seen in his room today. Non verbal and did not track with his eyes. He will make eye contact. He will need ongoing rehab services with PT/OT/ST. Has Gtube due to his dysphagia,   due to Scot Hypoxic brain injury he will not be able to understand hygiene education and nursing will need to meet these needs for pt. Scot also went to [facility] 12/20 to 1/8 due to agitation and needing med adjustments. This was completed and relatively stable behaviors since back to TCU.  .Staff to anticipate needs and provide good elizabeth care two times a day and prn.   continues to work with family towards DC to a group home after TCU stay as patient will be unable to care for self, remains dependant on staff for all ADL and mobility as well as GTube for feedings. He will have HHA services for PT/OT/ST.    SKILLED NURSING FACILITY COURSE:  During this TCU stay, patient completed all anticipated goals of therapy.      PHYSICAL EXAMINATION:    Vitals:    02/02/21 1642   BP: 112/64   Pulse: 74   Temp: 98.3  F (36.8  C)   Weight: 153 lb (69.4 kg)         GENERAL: Awake, Alert, oriented to self only, not in any form of acute distress, non conversant  HEENT: Head is normocephalic with normal hair distribution. No evidence of trauma. Ears: No acute purulent discharge. Eyes: Conjunctivae pink with no scleral jaundice. Nose: Normal mucosa and septum.  NECK: Supple with no cervical or supraclavicular lymphadenopathy. Trachea is midline.   ABDOMEN: Globular and soft, nontender to palpation, non distended, no masses, no organomegaly, good bowel sounds, no rebound or guarding, no peritoneal signs. Gtube in place  EXTREMITIES: Non purposeful movement, able to bear wt.  SKIN: Warm and dry, no erythema noted, no rashes or lesions.  NEUROLOGICAL: The patient is oriented to person,TBI, Aphasic       LABS:  All labs reviewed in the nursing home record.        DISCHARGE PLAN:   The Patient is homebound due to: Brain injury with dysphagia and it is , taxing and it will take a considerable amount of effort for patient to leave the home.  Hhe is dependent on others for transportation.  The patient is confined to his home and needs intermittent skilled nursing, PT,OT, RN, SW, and HHA.    Patient to be followed by home care for physical therapy to eval and treat for strengthening, balance, endurance, and safety with mobility, and ambulation.  Patient to be followed by home care for occupational therapy to eval and treat for strengthening, ADL needs, adaptive equipment, and safety.  Patient to be followed by home care for nursing services for medication set up and teaching, symptom and disease processes monitoring and education.    Patient to be followed by home care for home health aid services for bathing and ADL needs.  Planned discharge.  All therapy goals have been met.  Family will assist with discharge and transportation.        Patient will follow up with PCP within 7- days after discharge for medication mangagment and appropriate lab studies.    Per past ordered equipmet      Electronically signed by:  Fouzia High CNP  This progress note was completed using Dragon software and there may be grammatical errors.      For documentation purposes, chart review, medication management, and discharge coordination of care was greater than 35 minutes

## 2021-07-05 ENCOUNTER — HOSPITAL ENCOUNTER (OUTPATIENT)
Dept: NUTRITION | Facility: CLINIC | Age: 41
Discharge: HOME | End: 2021-07-05
Attending: DIETITIAN, REGISTERED | Admitting: DIETITIAN, REGISTERED
Payer: COMMERCIAL

## 2021-07-05 VITALS — BODY MASS INDEX: 27.43 KG/M2 | WEIGHT: 180.4 LBS

## 2021-07-05 PROCEDURE — 97803 MED NUTRITION INDIV SUBSEQ: CPT | Mod: GT | Performed by: DIETITIAN, REGISTERED

## 2021-07-05 NOTE — PROGRESS NOTES
Video-Visit Details    Type of service:  Video Visit    Video Start Time (time video started): 12:30    Video End Time (time video stopped): 12:55    Originating Location (pt. Location): Home (group home)    Distant Location (provider location):  [facility]     Mode of Communication:  Video Conference via [facility]    OUTPATIENT NUTRITION ASSESSMENT (VIDEO VISIT DUE TO COVID-19)   REASON FOR ASSESSMENT  Scot Bishop referred by Dr. Morales for MNT related to   Traumatic brain injury with loss of consciousness, sequela (H) [S06.9X9S]  - Primary       G tube feedings (H) [Z93.1]          Patient accompanied by Gerald, father; Audrey, step mother; , José Miguel; and staff member, Priyanka       ASSESSMENT   Nutrition History:  - Information obtained from father, step mom and staff worker.  Patient's step mom describes patient's eating habits as limited food choices with hamburgers and pizza but now eats all foods provided to him.  Patient's father states that when they feed him, patient will eat most of his meal.  Priyanka, staff worker, states patient eats 45-50% of meals.  Patient currently living at [facility] in [place].  Patient has 7 year old son.      Patient's father states patient's  lbs. Patient is on a regular diet with thin liquids.  Patient is a total feed.     Patient has regular formed bowel movements either daily or every other day.      7/5/2021 Received calorie count from group [facility].  Patient eating 100% of meals.  Patient receiving 2 meals per day as  states patient will vomit if given breakfast after TF administration.  Patient having difficulty drinking liquids as will chew on cups or straws.  Patient's father bought sippy cup for patient to try and seems to do well with it.  Patient will drink 16 oz fluid (orally) water and milk.  Patient receiving 3 (60 mL) water flushes 8 times per day.   asking if  "evening flushes can be changed due to prevent waking patient 3 times per night.        Diet Recall:  Breakfast: oatmeal   Lunch: 45-50%-turkey sandwich pudding/applesauce + fruit (fresh fruit-chevy or canned fruit)   Dinner: spaghetti  Snack: cookie or pudding   Beverages: water     Exercise: Patient working with PT and OT.      NUTRITION FOCUSED PHYSICAL ASSESSMENT (NFPA) FOR DIAGNOSING MALNUTRITION  Yes         Observed:   No nutrition-related physical findings observed     Obtained from Chart/Interdisciplinary Team:  Non-healing wound near G tube      LABS  Labs reviewed     MEDICATIONS/SUPPLEMENTS  Medications/supplements reviewed-no MVI      ANTHROPOMETRICS   Height: 5'8\"  Weight: 153 lbs (2/2/2021) 239 lbs (2/19/2021) weighed in wheelchair; patient has not been weighed in 4 months 180.4 lbs   BMI (kg/m2): 23.2 kg/m2  Weight Status:  Normal BMI  %IBW: 99%  Weight History: 2/19/2021 taken in MD office with patient in wheelchair. Patient with 15% weight gain in 6 months.   Wt Readings from Last 10 Encounters:   07/05/21 81.8 kg (180 lb 6.4 oz)   02/19/21 108.4 kg (239 lb)   02/02/21 69.4 kg (153 lb)   02/02/21 69.4 kg (153 lb)   01/26/21 69.9 kg (154 lb 3.2 oz)   01/21/21 67.3 kg (148 lb 6.4 oz)   01/19/21 68.6 kg (151 lb 3.2 oz)   01/19/21 54.9 kg (121 lb)   01/13/21 69 kg (152 lb 3.2 oz)   12/31/20 70.1 kg (154 lb 9.6 oz)      ASSESSED NUTRITION NEEDS  Estimated Energy Needs: 2540-3687 kcals/day (25-30 Kcal/Kg)  Justification:  (maintenance)  Estimated Protein Needs:  grams protein/day (1.2-1.5 g pro/Kg)  Justification:  (preservation of lean body mass)  Estimated Fluid Needs: 6598-6656 mL/day (30-35 mL/kg)     NUTRITION SUPPORT ENTERAL:  Type of Feeding Tube: G tube   Enteral Frequency:  Cyclic  Enteral Regimen: Isosource 1.5   Total Enteral Provisions: 140 mL x 11 hours  (8PM-7 AM) = 2310 kcals (33 kcals/kg) 105 gm Pro (1.5 gm/kg) 23 gm fiber 1170 mL free fluid   Free Water Flush: 180 mL water " flush 8 times per day (1440 mL)  Total fluid 1170 (free fluid) + 1440 mL (water flushes) = 2610 mL (32 mL/kg ABW)    Recommend Isosource 1.5 at 70 mL x 11 hours (8 PM-7 AM) = 1155 kcals (14 kcals/kg ABW) 52 gm protein 12 grams fiber 585 free fluid   1440 water flush + 585 free water from TF + 480 mL oral fluids = 2505 mL (31 mL/kg ABW)  Adjust fluid flushes 180 mL water flush 8 times per day   180 mL water flush before and after tube feeding (8 PM and 7 AM)  180 mL water flush 9 AM, 11 AM, 1 PM, 3 PM, 5 PM, 7 PM      ASSESSED MALNUTRITION STATUS  % Weight Loss:  None noted  % Intake:  No decreased intake noted  Subcutaneous Fat Loss:  None observed  Loss of Muscle Mass:  None observed  Fluid Retention:  None noted     Malnutrition Diagnosis:  Patient does not meet two of the above criteria necessary for diagnosing malnutrition    EVALUATION/PROGRESS TOWARDS GOALS  Previous nutrition goals:  Start 5 day calorie count and fax to RD  Weigh patient prior to next appointment     Previous nutrition diagnosis: Predicted excessive nutrient intake related to G-tube feeding and oral intake as evidenced by no reduction in TF since started po intake -resolved     Current nutrition diagnosis: Excessive nutrient intake related to G-tube feeding and oral intake as evidenced by no reduction in TF since started po intake and 15% weight gain      DIAGNOSIS   Nutrition Diagnosis:  Predicted excessive nutrient intake related to G-tube feeding and oral intake as evidenced by no reduction in TF since started po intake        INTERVENTIONS   Nutrition Prescription   Recommend 5 day calorie count to assess oral intake to wean TF     IMPLEMENTATION   Assessed learning needs and learning preference  Teaching Method(s) used: Explanation  Vitamin and Mineral Supplements: suggest complete multivitamin and mineral once weaned off TF  Diet Education:  Provided education on calorie intake   Nutrition Education (Content):              a)  Discussed  progress towards goals.  Reviewed intake as patient eating 100% of provided meals (2 per day).  Discussed continue calorie count to assess actual po intake for further assessment of intake.  Appreciate recwent weight on patient as had previously not been weighed February 2021.  Commended parents for care that is being provided to patient.  Patient's family and staff member verbalizes understanding of next steps for need for calorie count.    Expected patient engagement: N/A as non verbal   Other: Received verbal order from Dr. Morales stating approval of reduction of TF by 50%.  (Isosource 1.5 at 70 mL/hr x 11 hours).  Called  but unable to reach.  Left message to return RD call.      GOALS  Continue calorie count   Weekly weight  Isosource 1.5 at 70 mL/hr x 11 hours (8 PM-7AM)  180 mL water flush at 8 PM and 7 AM (beofre and after TF administration)  180 mL water flush 9 AM, 11 PM, 1 PM, 3 PM, 5 PM, 7 PM     FOLLOW UP/MONITORING   Progress towards goals will be monitored and evaluated per protocol and Practice Guidelines  Patient to follow up in 2 weeks  RD name and number provided      Time Spent with Patient  25 minutes     Lauryn Licona, RD, LD  [facility] Outpatient Dietitian  [phone] (office phone)

## 2021-07-07 ENCOUNTER — TELEPHONE (OUTPATIENT)
Dept: INTERNAL MEDICINE | Facility: CLINIC | Age: 41
End: 2021-07-07

## 2021-07-07 NOTE — TELEPHONE ENCOUNTER
Please fax below orders as requested to patients group home site for tube feeding orders.    Isosource 1.5 @ 70 mL x 11 hours (8 PM-7 AM)   180 mL water flush before and after TF administration ( 8 PM and 7 AM)   180 mL water flush 9 AM, 11 AM, 1 PM, 3 PM, 5 PM, 7 PM

## 2021-07-07 NOTE — TELEPHONE ENCOUNTER
See message below and prior message sent.  I have included fax number to group home as it was not available initially.  Please fax below recommendations for changes in tube feeding to group home.        [phone].  Thank you!

## 2021-07-07 NOTE — TELEPHONE ENCOUNTER
If not done so this will need to somehow be printed out so I can cosign it and then have it faxed.

## 2021-07-07 NOTE — TELEPHONE ENCOUNTER
----- Message from Carlene Sullivan RD, LD sent at 7/7/2021  8:16 AM CDT -----  Regarding: RE: TF change  [phone].  Thank you!  ----- Message -----  From: Cachorro Morales MD  Sent: 7/7/2021   8:04 AM CDT  To: Carlene Sullivan RD, LD  Subject: RE: TF change                                    Will need fax number please?  ----- Message -----  From: Carlene Crawford RD, LD  Sent: 7/6/2021   6:51 PM CDT  To: Cachorro Morales MD  Subject: RE: TF change                                    I called  today and she needs faxed order for group home. Can your nurse fax order of:    Isosource 1.5 @ 70 mL x 11 hours (8 PM-7 AM)  180 mL water flush before and after TF administration ( 8 PM and 7 AM)  180 mL water flush 9 AM, 11 AM, 1 PM, 3 PM, 5 PM, 7 PM     Carlene Gupta   ----- Message -----  From: Cachorro Morales MD  Sent: 7/6/2021   6:25 AM CDT  To: Carlene Sullivan RD, LD  Subject: RE: TF change                                    That would be fine.    Dr. Morales   ----- Message -----  From: Carlene Crawford RD, LD  Sent: 7/5/2021   6:10 PM CDT  To: Cachorro Morales MD  Subject: RE: TF change                                    Thanks.  I tried calling group home but was not able to reach the director.  Is it possible to move all water flushes to the day as staff waking up patient 3 times per night for water flushes?    ----- Message -----  From: Cachorro Morales MD  Sent: 7/5/2021   3:29 PM CDT  To: Carlene Sullivan RD, ELIO, #  Subject: RE: TF change                                    That would be fine if you feel that that would be appropriate in regards to his overall FT  care.  I will see if I can route this to my nurses for a verbal order but you could take this as a verbal approval also if applicable.Dr. Morales  ----- Message -----  From: Carlene Crawford RD, LD  Sent: 7/5/2021   3:21 PM  CDT  To: Cachorro Morales MD  Subject: TF change                                        Hi Dr. Morales,    I met with Scot, his family and group home staff today.  Would it be possible to get an order to decrease his TF by 50%: Isosource 1.5 at 70 mL/hr x 11 hours? 8 PM -7AM.  Can your RN contact group home for TF order change?     Thanks,    Lauryn Garcia, RD, LD  [facility]  [phone] (office phone)

## 2021-07-07 NOTE — TELEPHONE ENCOUNTER
----- Message from Carlene Sullivan RD, LD sent at 7/6/2021  6:51 PM CDT -----  Regarding: RE: TF change  I called  today and she needs faxed order for group home. Can your nurse fax order of:    Isosource 1.5 @ 70 mL x 11 hours (8 PM-7 AM)  180 mL water flush before and after TF administration ( 8 PM and 7 AM)  180 mL water flush 9 AM, 11 AM, 1 PM, 3 PM, 5 PM, 7 PM     Carlene Gupta   ----- Message -----  From: Cachorro Morales MD  Sent: 7/6/2021   6:25 AM CDT  To: Carlene Sullivan RD, LD  Subject: RE: TF change                                    That would be fine.    Dr. Morales   ----- Message -----  From: Carlene Crawford RD, ELIO  Sent: 7/5/2021   6:10 PM CDT  To: Cachorro Morales MD  Subject: RE: TF change                                    Thanks.  I tried calling group home but was not able to reach the director.  Is it possible to move all water flushes to the day as staff waking up patient 3 times per night for water flushes?    ----- Message -----  From: Cachorro Morales MD  Sent: 7/5/2021   3:29 PM CDT  To: Carlene Sullivan RD, ELIO, #  Subject: RE: TF change                                    That would be fine if you feel that that would be appropriate in regards to his overall FT  care.  I will see if I can route this to my nurses for a verbal order but you could take this as a verbal approval also if applicable.Dr. Morales  ----- Message -----  From: Carlene Crawford RD, LD  Sent: 7/5/2021   3:21 PM CDT  To: Cachorro Morales MD  Subject: TF change                                        Hi Dr. Morales,    I met with Scot, his family and group home staff today.  Would it be possible to get an order to decrease his TF by 50%: Isosource 1.5 at 70 mL/hr x 11 hours? 8 PM -7AM.  Can your RN contact group home for TF order change?     Thanks,    Lauryn Garcia, RD, LD  [facility]  [phone] (office phone)

## 2021-07-07 NOTE — PATIENT INSTRUCTIONS
1.    Continue valproic acid  500 mg (10 ml) twice daily per PEG tube    2.    Continue benztropine 0.5 mg twice daily     3.    Will await to see what physical therapy has to say about Scot's muscle tone before making other medication changes    4.    Continue paroxetine 20 mg daily    5.    Change quetiapine to 50 mg at 2 PM    6.    Continue amantadine 100 mg twice daily    7.    Lorazepam 0.5 mg twice daily    8.    Continue olanzapine 5 mg twice daily    9.    Review use of Colace for constipation with primary care provider      Continue all other medications as reviewed per electronic medical record today.     Safety plan reviewed. To the Emergency Department as needed or call after hours crisis line at [phone] or [phone]. [place] Crisis Text [place]. Text [place] to 925686 or Suicide LifeLine Chat: Furious.org/chat/    To schedule individual or family therapy, call [facility] at [phone].    Schedule an appointment with me in July or sooner as needed. Call [facility] at [phone] to schedule.    Follow up with primary care provider as planned or for acute medical concerns.    Call the psychiatric nurse line with medication questions or concerns at [phone].    ModusPt may be used to communicate with your provider, but this is not intended to be used for emergencies.    Crisis Resources:    National Suicide Prevention Lifeline: 296.736.5970 (TTY: 796.924.7711). Call anytime for help.  (www.suicidepreventionlifeline.org)  National Check on Mental Illness (www.melvin.org): 405.573.1729 or 947-586-1041.   Mental Health Association (www.mentalhealth.org): 166.447.6698 or 170-577-1802.  [place] Crisis [place]: Text [place] to 126105  Suicide LifeLine Chat: Furious.org/chat

## 2021-07-07 NOTE — CONFIDENTIAL NOTE
Patient Orders:    Isosource 1.5 @ 70 mL x 11 hours (8 PM-7 AM)   180 mL water flush before and after TF administration ( 8 PM and 7 AM)   180 mL water flush 9 AM, 11 AM, 1 PM, 3 PM, 5 PM, 7 PM     Provider Signature:_______________________________

## 2021-07-07 NOTE — TELEPHONE ENCOUNTER
"Group home calling, needs this written as an \"order\" with a PHYSICAL SIGNATURE. Please give to station coordinator to fax to [phone] when order is signed and ready  "

## 2021-07-08 ENCOUNTER — VIRTUAL VISIT (OUTPATIENT)
Dept: PSYCHIATRY | Facility: CLINIC | Age: 41
End: 2021-07-08
Payer: COMMERCIAL

## 2021-07-08 DIAGNOSIS — F34.81 DISRUPTIVE MOOD DYSREGULATION DISORDER (H): Primary | ICD-10-CM

## 2021-07-08 DIAGNOSIS — F51.04 PSYCHOPHYSIOLOGICAL INSOMNIA: ICD-10-CM

## 2021-07-08 DIAGNOSIS — G25.9 EXTRAPYRAMIDAL RIGIDITY: ICD-10-CM

## 2021-07-08 PROCEDURE — 99214 OFFICE O/P EST MOD 30 MIN: CPT | Mod: 95 | Performed by: NURSE PRACTITIONER

## 2021-07-08 RX ORDER — PAROXETINE 30 MG/1
30 TABLET, FILM COATED ORAL EVERY MORNING
Qty: 30 TABLET | Refills: 1 | Status: SHIPPED | OUTPATIENT
Start: 2021-07-08 | End: 2021-09-02

## 2021-07-08 NOTE — PATIENT INSTRUCTIONS
1.    Continue valproic acid  500 mg (10 ml) twice daily     2.    Continue benztropine 0.5 mg twice daily     3.    Continue Olanzapine 5 mg twice daily    4.    Increase paroxetine to 30 mg daily    5.    Discontinue quetiapine     6.    Continue amantadine 100 mg twice daily    7.    Lorazepam 0.5 mg twice daily       Continue all other medications as reviewed per electronic medical record today.     Safety plan reviewed. To the Emergency Department as needed or call after hours crisis line at [phone] or [phone]. [place] Crisis [place]. Text [place] to 473659 or Suicide LifeLine Chat: Lemonwise.org/chat/    To schedule individual or family therapy, call [facility] at [phone].    Appointment scheduled for August 12 or sooner as needed. Call [facility] at [phone] to schedule.    Follow up with primary care provider as planned or for acute medical concerns.    Call the psychiatric nurse line with medication questions or concerns at [phone].    Cisco may be used to communicate with your provider, but this is not intended to be used for emergencies.    Crisis Resources:    National Suicide Prevention Lifeline: 263.960.4744 (TTY: 271.611.1422). Call anytime for help.  (www.suicidepreventionlifeline.org)  National [place] on Mental Illness (www.melvin.org): 679.658.4340 or 127-764-2351.   Mental Health Association (www.mentalhealth.org): 770.632.9075 or 305-621-1958.  [place] Crisis [place]: Text [place] to 372088  Suicide LifeLine Chat: Lemonwise.org/chat

## 2021-07-08 NOTE — PROGRESS NOTES
" This video/telephone visit will be conducted via a call between you and your physician/provider. We have found that certain health care needs can be provided without the need for an in-person physical exam. This service lets us provide the care you need with a video /telephone conversation. If a prescription is necessary we can send it directly to your pharmacy. If lab work is needed we can place an order for that and you can then stop by our lab to have the test done at a later time.    Just as we bill insurance for in-person visits, we also bill insurance for video/telephone visits. If you have questions about your insurance coverage, we recommend that you speak with your insurance company.    Patient has given verbal consent for video/Telephone visit? Yes   Patient would like the video visit invitation sent by: Margie Duarte (guardian) answered all questions. Due to Pt being mute did not complete PHQ or CLEMENT.  If connections issues I reached Gerald @ [phone].  [facility]/FERNY PALMER [facility]   Patient's Birthday is Today    Patient's Father (Gerald) verified allergies, medications and pharmacy via phone.   Patient states he is ready for visit.          Outpatient Psychiatric Progress Note    Name: Scot Bishop   : 1980                    Primary Care Provider: Cachorro Morales MD   Therapist: None     PHQ-9 scores:  No flowsheet data found.    CLEMENT-7 scores:  No flowsheet data found.    Patient Identification:    Patient is a 41 year old year old, single  White Not  or  male  who presents for return visit with me.  Patient is currently disabled. Patient attended the session with His parents , who they agreed to have interview with. Patient prefers to be called: \"Scot\".    Interim History:    I last saw Scot Bishop for outpatient psychiatry Return Visit on 2021.     During that appointment, he continued to have some anger outbursts but assisted living staff report no concerns to " change medications.  He will continue valproic acid for impulsive behaviors.  Quetiapine is at 2 PM only.  Paroxetine continues for depression and anxiety symptoms.  Amantadine and benztropine are continued for dystonic muscle movements.  Lorazepam has been helpful in managing his anxiety level and that we will continue twice daily at 0.5 mg.  He will continue olanzapine twice daily to help with irritability.  We will await assessment from physical therapy regarding Scot's muscle tone before making other medication changes. .     Current medications include: acetaminophen (TYLENOL) 325 MG tablet, Take 1 tablet (325 mg) by mouth every 6 hours as needed for mild pain or fever  amantadine (SYMMETREL) 50 MG/5ML syrup, 10 mLs (100 mg) by Per G Tube route every 12 hours VIA PEG-Tube  aspirin (ASA) 81 MG chewable tablet, 1 tablet (81 mg) by Per Feeding Tube route daily  atorvastatin (LIPITOR) 40 MG tablet, 1 tablet (40 mg) by Per G Tube route At Bedtime Via PEG-tube  bacitracin 500 UNIT/GM OINT, Apply 1 Application topically 2 times daily  - Topical as needed  bacitracin-neomycin-polymyxin (NEOSPORIN) 400-5-5000 external ointment, Apply 1 Application topically 2 times daily Topical as needed  benztropine (COGENTIN) 0.5 MG tablet, Take 1 tablet (0.5 mg) by mouth 2 times daily  bromocriptine (PARLODEL) 2.5 MG tablet, 2.5 mg by Per G Tube route  docusate sodium (COLACE) 100 MG capsule, Take 1 capsule (100 mg) by mouth 2 times daily as needed for constipation  Emollient (AQUAPHOR ADVANCED THERAPY) OINT, Apply topically 2 times daily As needed.  lisinopril (ZESTRIL) 2.5 MG tablet,   LORazepam (ATIVAN) 0.5 MG tablet, Take 1 tablet (0.5 mg) by mouth 2 times daily  metoclopramide (REGLAN) 5 MG tablet,   metoprolol tartrate (LOPRESSOR) 25 MG tablet, Take 0.5 tablets (12.5 mg) by mouth 2 times daily  nystatin (MYCOSTATIN) 327237 UNIT/GM external cream, Apply topically 2 times daily As needed  OLANZapine (ZYPREXA) 5 MG tablet, Take  1 tablet (5 mg) by mouth 2 times daily  omeprazole (PRILOSEC) 2 mg/mL suspension, 40 mg by Per Feeding Tube route every morning (before breakfast)   pantoprazole (PROTONIX) 40 MG EC tablet, Take 40 mg by mouth  PARoxetine (PAXIL) 20 MG tablet, 1 tablet (20 mg) by Oral or Feeding Tube route every morning  Pediatric Multiple Vit-C-FA (CHILDRENS CHEWABLE VITAMINS) CHEW, Take 1 tablet by mouth  QUEtiapine (SEROQUEL) 50 MG tablet, Take 1 tablet (50 mg) by mouth daily at 2 pm  ticagrelor (BRILINTA) 90 MG tablet, Take 1 tablet (90 mg) by mouth 2 times daily  Valproate Sodium (VALPROIC ACID) 250 MG/5ML, 10 mLs (500 mg) by Per PEG tube route 2 times daily    No current facility-administered medications on file prior to visit.        The [place] Prescription Monitoring Program has been reviewed and there are no concerns about diversionary activity for controlled substances at this time.      I was able to review most recent Primary Care Provider, specialty provider, and therapy visit notes that I have access to.     Today, assisted living staff report that he gets aggressive when cared for.  He has physical therapy and noconcerns for muscle movements.   He is using afork and trying to use a cup.  He also has OT.  He watches television.  He has episodes of crying.  Sometimes he says words .  He is talking more at nigiht.     has no past medical history on file.    Social history updates:    Scot is considered disabled.  He continues to reside at an assisted living facility.  He is incontinent.    Vital Signs:   There were no vitals taken for this visit.    Labs:    Most recent laboratory results reviewed and no new labs.     Review of Systems:  10 systems (general, cardiovascular, respiratory, eyes, ENT, endocrine, GI, , M/S, neurological) were reviewed. Most pertinent finding(s) is/are: Scot is wheelchair-bound but does ambulate with assistance and physical therapy sessions, nonverbal, able to take medications orally,  able to eat food. The remaining systems are all unremarkable.    Mental Status Examination:  Appearance:  awake, alert  Attitude:  evasive and somewhat cooperative   Eye Contact:  fair  Gait and Station: No dizziness or falls, Uses wheelchair and Uses walker  Psychomotor Behavior:  fidgeting  Oriented to:  Person  Attention Span and Concentration:  Easily distracted  Speech:   mute  Mood:  anxious and depressed  Affect:  restricted range  Associations:  Unable to assess  Thought Process:  linear  Thought Content:  Unable to assess  Recent and Remote Memory:  poor Not formally assessed. No amnesia.  Fund of Knowledge: low-normal  Insight:  limited  Judgment:  poor  Impulse Control:  poor    Suicide Risk Assessment:  Today Scot Bishop's assisted living staff and parents report no attempts to end his life but he does have episodes of hitting other people. In addition, there are notable risk factors for self-harm, including single status, anxiety, comorbid medical condition of Traumatic brain injury and brain anoxia and mood change. However, risk is mitigated by absence of past attempts. Therefore, based on all available evidence including the factors cited above, Scot Bishop does not appear to be at imminent risk for self-harm, does not meet criteria for a 72-hr hold, and therefore remains appropriate for ongoing outpatient level of care.  A thorough assessment of risk factors related to suicide and self-harm have been reviewed and are noted above. The patient convincingly denies suicidality on several occasions. Local community safety resources printed and reviewed for patient to use if needed. There was no deceit detected, and the patient presented in a manner that was believable.     DSM5 Diagnosis:  296.99 (F34.8) Disruptive Mood Dysregulation Disorder  780.52 (G47.00) Insomnia Disorder   With ohter medical comorbidity  Recurrent      Medical comorbidities include:   Patient Active Problem List     Diagnosis Date Noted     Nicotine dependence 04/16/2021     Priority: Medium     Formatting of this note might be different from the original.  Created by Conversion       Backache 04/16/2021     Priority: Medium     Formatting of this note might be different from the original.  Created by Conversion    Replacement Utility updated for latest IMO load       Thrombocytopenia, unspecified (H) 04/16/2021     Priority: Medium     Thrush, oral 12/09/2020     Priority: Medium     Advance care planning 12/04/2020     Priority: Medium     Formatting of this note might be different from the original.  Community Palliative Care was asked to see patient by inpatient palliative care on 11/23 for continuation of palliative care in a new setting.  Date of last visit: 12/9/2020    Formatting of this note is different from the original.  Advance Care Planning   Patient has identified Health Care Agent(s): Yes  Add Health Care Agents: Yes    Health Care Agent(s):  Guardian: Gerald Bishop Relationship: father Phone: [phone]     Patient has Advance Care Plan Documents (Health Care Directive, POLST): Yes    Advance Care Plan Documents:  POLST Form     Patient has identified Specific Treatment Preferences: Yes     How have preferences been verified: POLST    Specific Treatment Preferences:   a.) Code Status:  DNR/ Do Not Attempt Resuscitation - Allow a Natural Death  b.) Goals of Treatment:   ii. Selective Treatment: Use medical treatment, antibiotics, IV fluids and cardiac monitor as indicated. No intubation, advanced airway interventions, or mechanical ventilation. May consider less invasive airway support (e.g. CPAP, BiPAP). Transfer to hospital if indicated. Generally avoid the intensive care unit. All patients will receive comfort-focused treatments.  TREATMENT PLAN: Provide basic medical treatments aimed at treating new or reversible illness.  c.) Interventions and Treatments:  i.  Artificially Administered Nutrition and  Hydration: - Long term artificial nutrion/hydration by tube through (not specified) (specify mouth/nose) and (not specified) (specify if ok with directly into GI tract)  ii.  Antibiotics: - Oral antibiotics only (NO IV/IM)       Acute respiratory failure with hypoxia (H) 12/04/2020     Priority: Medium     Nonverbal 11/11/2020     Priority: Medium     Gastrojejunostomy tube status (H) 11/11/2020     Priority: Medium     Cognitive disorder 11/11/2020     Priority: Medium     Aggression 11/09/2020     Priority: Medium     Agitation 09/21/2020     Priority: Medium     Yeast infection 09/03/2020     Priority: Medium     G tube feedings (H) 09/03/2020     Priority: Medium     Dysphagia 08/04/2020     Priority: Medium     Traumatic brain injury (H) 06/25/2020     Priority: Medium     Cardiac arrest (H) 05/17/2020     Priority: Medium     Back injury, sequela 10/27/2017     Priority: Medium     Formatting of this note might be different from the original.  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  He has appeared to be less overmedicated, according to family members.  Assisted living staff report that the lobe clonazepam is helpful in keeping him calm for part of the day as he seems to get more distressed when people are attempting to care for him.  Amantadine and benztropine are continued at twice daily for muscle rigidity and involuntary movements.  Quetiapine has been discontinued now that he is continuing with the olanzapine at 5 mg twice daily.  This is to help with mood regulation.  Paroxetine was increased to 30 mg daily to address depression and anxiety symptoms mood changes and actions suggesting distress.  He will continue the valproic acid 500 mg twice daily for  impulsivity..    Medication side effects and alternatives were reviewed. Health promotion activities recommended and reviewed today. All questions addressed. Education and counseling completed regarding risks and benefits of medications and psychotherapy options.    Treatment Plan:        1.    Continue valproic acid  500 mg (10 ml) twice daily     2.    Continue benztropine 0.5 mg twice daily     3.    Continue Olanzapine 5 mg twice daily    4.    Increase paroxetine to 30 mg daily    5.    Discontinue quetiapine     6.    Continue amantadine 100 mg twice daily    7.    Lorazepam 0.5 mg twice daily       Continue all other medications as reviewed per electronic medical record today.     Safety plan reviewed. To the Emergency Department as needed or call after hours crisis line at [phone] or [phone]. [place] Crisis Text [place]. Text [place] to 356154 or Suicide LifeLine Chat: Edvivo.org/chat/    To schedule individual or family therapy, call [facility] at [phone].    Appointment scheduled for August 12 or sooner as needed. Call [facility] at [phone] to schedule.    Follow up with primary care provider as planned or for acute medical concerns.    Call the psychiatric nurse line with medication questions or concerns at [phone].    AboutUs.orghart may be used to communicate with your provider, but this is not intended to be used for emergencies.    Crisis Resources:    National Suicide Prevention Lifeline: 223.428.9983 (TTY: 813.349.2978). Call anytime for help.  (www.suicidepreventionlifeline.org)  National [place] on Mental Illness (www.melvin.org): 489.148.7363 or 881-418-2644.   Mental Health Association (www.mentalhealth.org): 489.948.5327 or 329-629-9207.  [place] Crisis Text [place]: Text [place] to 924655  Suicide LifeLine Chat: suicideReGear Life Sciences.org/chat    Administrative Billing:   Time spent with patient was 30 minutes and greater than 50% of  time or 20 minutes was spent in counseling and coordination of care regarding above diagnoses and treatment plan.    Patient Status:  Patient will continue to be seen for ongoing consultation and stabilization.    Signed:   LASHAWN Jeong-BC   Psychiatry

## 2021-07-08 NOTE — PROGRESS NOTES
________________________________________  Medications Phoned  to Pharmacy [] yes [x]no  Name of Pharmacist:  List Medications, including dose, quantity and instructions    Medications ordered this visit were e-scribed.  Verified by order class [x] yes  [] no   Paxil 30 mg   Medication changes or discontinuations were communicated to patient's pharmacy: [] yes  [x] no    Dictation completed at time of chart check: [] yes  [x] no    I have checked the documentation for today s encounters and the above information has been reviewed and completed.

## 2021-07-14 ENCOUNTER — TELEPHONE (OUTPATIENT)
Dept: PSYCHIATRY | Facility: CLINIC | Age: 41
End: 2021-07-14

## 2021-07-14 NOTE — TELEPHONE ENCOUNTER
[facility] calling to check status on refill request for Valproic Acid.    Next appt: 8/12/21    Please fill    [facility] [address]  [phone]

## 2021-07-19 ENCOUNTER — HOSPITAL ENCOUNTER (OUTPATIENT)
Dept: NUTRITION | Facility: CLINIC | Age: 41
Discharge: HOME | End: 2021-07-19
Attending: DIETITIAN, REGISTERED | Admitting: DIETITIAN, REGISTERED
Payer: COMMERCIAL

## 2021-07-19 VITALS — WEIGHT: 184.6 LBS | BODY MASS INDEX: 28.07 KG/M2

## 2021-07-19 PROCEDURE — 97803 MED NUTRITION INDIV SUBSEQ: CPT | Mod: GT | Performed by: DIETITIAN, REGISTERED

## 2021-07-19 NOTE — PROGRESS NOTES
Video-Visit Details    Type of service:  Video Visit    Video Start Time (time video started): 10:30    Video End Time (time video stopped): 10:52     Originating Location (pt. Location): Other snf     Distant Location (provider location):  [facility] NUTRITION SERVICES     Mode of Communication:  Video Conference via [facility]    OUTPATIENT NUTRITION ASSESSMENT (VIDEO VISIT DUE TO COVID-19)   REASON FOR ASSESSMENT  Scot Bishop referred by Dr. Morales for MNT related to       Traumatic brain injury with loss of consciousness, sequela (H) [S06.9X9S]  - Primary       G tube feedings (H) [Z93.1]           Patient accompanied by Gerald, father; staff members Jonn and Priyanka       ASSESSMENT   Nutrition History:  - Information obtained from father, step mom and staff worker.  Patient's step mom describes patient's eating habits as limited food choices with hamburgers and pizza but now eats all foods provided to him.  Patient's father states that when they feed him, patient will eat most of his meal.  Priyanka, staff worker, states patient eats 45-50% of meals.  Patient currently living at [facility] in [place].  Patient has 7 year old son.      Patient's father states patient's  lbs. Patient is on a regular diet with thin liquids.  Patient is a total feed.     Patient has regular formed bowel movements either daily or every other day.       7/5/2021 Received calorie count from group [facility].  Patient eating 100% of meals.  Patient receiving 2 meals per day as  states patient will vomit if given breakfast after TF administration.  Patient having difficulty drinking liquids as will chew on cups or straws.  Patient's father bought sippy cup for patient to try and seems to do well with it. Patient will drink 16 oz fluid (orally) water and milk.  Patient receiving 3 (60 mL) water flushes 8 times per day.   asking if evening flushes can be changed due  "to prevent waking patient 3 times per night.       7/19 No calorie count provided by nursing home.  Diet recall past 24 hours: scrambled egg and yogurt; tuna sandwich, banana and water; spaghetti with shrimp, orange and milk-drank 25%.  Patient continues to drink with sippy cup.  Patient's father states can drink entire sippy cup (8-10 oz in 1 hour). Staff members don't feel patient will drink more than 8 oz per day from sippy cup.  Patient does not eat full breakfast due to previous vomiting after the TF.  Per father, patient working with speech therapist, PT and OT weekly.      Diet Recall:  Breakfast: oatmeal   Lunch: 45-50%-turkey sandwich pudding/applesauce + fruit (fresh fruit-chevy or canned fruit)   Dinner: spaghetti  Snack: cookie or pudding   Beverages: water     Exercise: Patient working with PT and OT.      NUTRITION FOCUSED PHYSICAL ASSESSMENT (NFPA) FOR DIAGNOSING MALNUTRITION  Yes         Observed:   No nutrition-related physical findings observed     Obtained from Chart/Interdisciplinary Team:  Non-healing wound near G tube      LABS  Labs reviewed     MEDICATIONS/SUPPLEMENTS  Medications/supplements reviewed-no MVI      ANTHROPOMETRICS   Height: 5'8\"  Weight: 184.6 lbs (7/19/2021) per group home staff; 180.4 lbs 7/5/2021 (patient had not been weighed in 4 months)   153 lbs (2/2/2021) 239 lbs (2/19/2021) weighed in wheelchair  BMI (kg/m2): 28.1 kg/m2  Weight Status:  Overweight   %IBW: 120%  Weight History: 2/19/2021 taken in MD office with patient in wheelchair. Patient with 15% weight gain in 6 months. Patient with 2.5% weight gain in 2 weeks.   Wt Readings from Last 10 Encounters:   07/19/21 83.7 kg (184 lb 9.6 oz)   07/05/21 81.8 kg (180 lb 6.4 oz)   02/19/21 108.4 kg (239 lb)   02/02/21 69.4 kg (153 lb)   02/02/21 69.4 kg (153 lb)   01/26/21 69.9 kg (154 lb 3.2 oz)   01/21/21 67.3 kg (148 lb 6.4 oz)   01/19/21 68.6 kg (151 lb 3.2 oz)   01/19/21 54.9 kg (121 lb)   01/13/21 69 kg (152 lb 3.2 " oz)      ASSESSED NUTRITION NEEDS  Estimated Energy Needs: 5823-7304 kcals/day (25-30 Kcal/Kg)  Justification:  (maintenance)  Estimated Protein Needs:  grams protein/day (1.2-1.5 g pro/Kg)  Justification:  (preservation of lean body mass)  Estimated Fluid Needs: 3249-6639 mL/day (30-35 mL/kg)      NUTRITION SUPPORT ENTERAL:  Type of Feeding Tube: G tube   Enteral Frequency:  Cyclic  Enteral Regimen: Isosource 1.5   Total Enteral Provisions: Isosource 1.5 at 70 mL x 11 hours (8 PM-7 AM) = 1155 kcals (14 kcals/kg ABW) 52 gm protein 12 grams fiber 585 free fluid   1440 water flush + 585 free water from TF + 480 mL oral fluids = 2505 mL (31 mL/kg ABW)  Adjust fluid flushes 180 mL water flush 8 times per day   180 mL water flush before and after tube feeding (8 PM and 7 AM)  180 mL water flush 9 AM, 11 AM, 1 PM, 3 PM, 5 PM, 7 PM     Recommend reduction in TF by 50% (Isosource 1.5 at 35 mL/hr x 11 hours -7 PM - 8 AM) to provide 575 calories, 26 grams protein, 292 mL free fluid and 6 grams fiber  Continue current fluid flushes      ASSESSED MALNUTRITION STATUS  % Weight Loss:  None noted  % Intake:  No decreased intake noted  Subcutaneous Fat Loss:  None observed  Loss of Muscle Mass:  None observed  Fluid Retention:  None noted     Malnutrition Diagnosis:  Patient does not meet two of the above criteria necessary for diagnosing malnutrition     EVALUATION/PROGRESS TOWARDS GOALS  Previous nutrition goals:  Continue calorie count -not met   Weekly weight-met   Isosource 1.5 at 70 mL/hr x 11 hours (8 PM-7AM)-met  180 mL water flush at 8 PM and 7 AM (beofre and after TF administration)-met  180 mL water flush 9 AM, 11 PM, 1 PM, 3 PM, 5 PM, 7 PM-met     Previous nutrition diagnosis:  Excessive nutrient intake related to G-tube feeding and oral intake as evidenced by no reduction in TF since started po intake and 15% weight gain -no change, modified below     Current nutrition diagnosis: Excessive nutrient intake related  to G-tube feeding and oral intake as evidenced by 15% weight gain in 4 months and 2.5% weight gain in 2 weeks      INTERVENTIONS   Nutrition Prescription   Recommend 5 day calorie count to assess oral intake to wean TF     IMPLEMENTATION   Assessed learning needs and learning preference  Teaching Method(s) used: Explanation  Vitamin and Mineral Supplements: suggest complete multivitamin and mineral once weaned off TF  Diet Education:  Provided education on calorie intake   Nutrition Education (Content):              a)  Discussed progress towards goals.  Reviewed intake as patient eating 100% of provided meals (2 per day). Discussed reduction in TF to see if 100% po intake continues. Appreciate consistent weight on patient as had previously not been weighed since February 2021.  Patient's family and staff member verbalizes understanding of next steps.  Expected patient engagement: N/A as non verbal      GOALS  Weekly weight  Isosource 1.5 at 35 mL/hr x 11 hours (8 PM-7AM)  180 mL water flush at 8 PM and 7 AM (beofre and after TF administration)  180 mL water flush 9 AM, 11 PM, 1 PM, 3 PM, 5 PM, 7 PM     FOLLOW UP/MONITORING   Progress towards goals will be monitored and evaluated per protocol and Practice Guidelines  Patient to follow up in 2 weeks  RD name and number provided      Time Spent with Patient  20 minutes     Lauryn Licona, RD, LD  [facility] Outpatient Dietitian  [phone] (office phone)

## 2021-07-20 ENCOUNTER — TELEPHONE (OUTPATIENT)
Dept: INTERNAL MEDICINE | Facility: CLINIC | Age: 41
End: 2021-07-20

## 2021-07-20 ENCOUNTER — TELEPHONE (OUTPATIENT)
Dept: NUTRITION | Facility: CLINIC | Age: 41
End: 2021-07-20

## 2021-07-20 NOTE — PROGRESS NOTES
Called [facility] and spoke with Tasha    Recommend TF change: Isosource 1.5 @ 35 mL/hr x 11 hours (8 PM-7 PM).    Please fax order to group home: [phone]    Please call RD if have questions.    Thanks,    Lauryn Garcia, RD, LD  [facility] Outpatient Dietitian  [phone] (office phone)

## 2021-07-20 NOTE — TELEPHONE ENCOUNTER
----- Message from Cachorro Morales MD sent at 7/20/2021  6:44 AM CDT -----  Regarding: RE: TF   If you can take a verbal order then we can proceed and I can okay this request if not then please replace request as a telephone request rather than a staff message as these types of messages are difficult to transition into orders that can be copied.    Dr. Morales  ----- Message -----  From: Carlene Crawford, PATRICIA, LD  Sent: 7/19/2021   6:21 PM CDT  To: Cachorro Morales MD  Subject: TF                                               Hi Dr. Morales,    I met with Scot today.  Can we reduce his TF to Isosource 1.5 @ 35 mL/hr x 11 (8 PM -7 AM)?  If so, please fax order to [phone].    Patient has gained 31 lbs since February.  BMI of 28.  Will likely be able to wean to complete po for food in 2 weeks.  Patient is not able to drink adequate fluids so will need water flushes.      Thanks,    Lauryn Garcia, RD, LD  [facility] Outpatient Dietitian  [phone] (office phone)

## 2021-07-20 NOTE — TELEPHONE ENCOUNTER
Davon Concepcion from [facility] called to recommend TF Isosource 1.5 change.    Recommend TF change: Isosource 1.5 @ 35 mL/hr x 11 hours (8 PM-7 PM).    Once approved please fax new order to:  [phone] - [facility] home fax. José Miguel, facility director    Any questions can call Carlene at [phone].      Dianne Hassan RN

## 2021-07-20 NOTE — TELEPHONE ENCOUNTER
PCP signed order and triage faxed to:    [phone] - [facility] home fax. José Miguel, facility director    Marguerite Cuevas, MSN, RN

## 2021-07-21 ENCOUNTER — TELEPHONE (OUTPATIENT)
Dept: INTERNAL MEDICINE | Facility: CLINIC | Age: 41
End: 2021-07-21

## 2021-07-27 DIAGNOSIS — G25.9 EXTRAPYRAMIDAL RIGIDITY: ICD-10-CM

## 2021-07-27 DIAGNOSIS — F34.81 DISRUPTIVE MOOD DYSREGULATION DISORDER (H): ICD-10-CM

## 2021-07-27 RX ORDER — OLANZAPINE 5 MG/1
5 TABLET ORAL
Qty: 60 TABLET | Refills: 1 | Status: SHIPPED | OUTPATIENT
Start: 2021-07-27 | End: 2021-08-12 | Stop reason: DRUGHIGH

## 2021-07-27 RX ORDER — BENZTROPINE MESYLATE 0.5 MG/1
0.5 TABLET ORAL 2 TIMES DAILY
Qty: 60 TABLET | Refills: 1 | Status: SHIPPED | OUTPATIENT
Start: 2021-07-27 | End: 2021-09-16

## 2021-07-27 RX ORDER — LORAZEPAM 0.5 MG/1
0.5 TABLET ORAL
Qty: 60 TABLET | Refills: 1 | Status: SHIPPED | OUTPATIENT
Start: 2021-07-27 | End: 2021-09-28

## 2021-07-27 NOTE — TELEPHONE ENCOUNTER
Date of Last Office Visit: 7/8/21  Date of Next Office Visit: 8/12/21  No shows since last visit: 0  Cancellations since last visit: 0    Medication requested: benztropine 0.5 mg Date last ordered: 5/26/21 Qty: 60 Refills: 1    Medication requested: lorazepam 0.5 mg Date last ordered: 6/28/21 Qty: 60 Refills: 0    Medication requested: olanzapine 5 mg Date last ordered: 5/26/21 Qty: 60 Refills: 1     Review of [place] ?: Yes  Medication last filled date: 7/5/21 Qty filled: 56  Other [place] substance on [place] ?: No    Lapse in medication adherence greater than 5 days?: No  If yes, call patient and gather details: N/A  Medication refill request verified as identical to current order?: Yes  Result of Last DAM, VPA, Li+ Level, CBC, or Carbamazepine Level (at or since last visit): N/A    []Medication refilled per  Medication Refill in Ambulatory Care  policy.  [x]Medication unable to be refilled by RN due to criteria not met as indicated below:    []Eligibility - not seen in the last year   []Supervision - no future appointment   []Compliance - no shows, cancellations or lapse in therapy   []Verification - order discrepancy   [x]Controlled medication   []Medication not included in policy   []90-day supply request   []Other

## 2021-07-30 NOTE — TELEPHONE ENCOUNTER
José Miguel with [facility] ([phone]) calling for updated nutrition order.     Order below printed & faxed to them @ [phone].

## 2021-08-02 ENCOUNTER — HOSPITAL ENCOUNTER (OUTPATIENT)
Dept: NUTRITION | Facility: CLINIC | Age: 41
Discharge: HOME | End: 2021-08-02
Attending: DIETITIAN, REGISTERED | Admitting: DIETITIAN, REGISTERED
Payer: COMMERCIAL

## 2021-08-02 VITALS — WEIGHT: 177.2 LBS | BODY MASS INDEX: 26.94 KG/M2

## 2021-08-02 PROCEDURE — 97803 MED NUTRITION INDIV SUBSEQ: CPT | Mod: GT | Performed by: DIETITIAN, REGISTERED

## 2021-08-02 NOTE — PROGRESS NOTES
Video-Visit Details    Type of service:  Video Visit    Video Start Time (time video started): 10:30    Video End Time (time video stopped): 10:51    Originating Location (pt. Location): Other group home (patient)     Distant Location (provider location):  [facility] NUTRITION SERVICES     Mode of Communication:  Video Conference via [facility]    OUTPATIENT NUTRITION ASSESSMENT (VIDEO VISIT DUE TO COVID-19)   REASON FOR ASSESSMENT  Scot Bishop referred by Dr. Morales for MNT related to                Traumatic brain injury with loss of consciousness, sequela (H) [S06.9X9S]  - Primary       G tube feedings (H) [Z93.1]             Patient accompanied by Gerald, father; Audrey, step mother; staff members, José Miguel        ASSESSMENT   Nutrition History:  - Information obtained from father, step mom and staff worker.  Patient's step mom describes patient's eating habits as limited food choices with hamburgers and pizza but now eats all foods provided to him.  Patient's father states that when they feed him, patient will eat most of his meal.  Priyanka, staff worker, states patient eats 45-50% of meals.  Patient currently living at [facility] in [place].  Patient has 7 year old son.      Patient's father states patient's  lbs. Patient is on a regular diet with thin liquids.  Patient is a total feed.     Patient has regular formed bowel movements either daily or every other day.       7/5/2021 Received calorie count from group home.  Patient eating 100% of meals.  Patient receiving 2 meals per day as  states patient will vomit if given breakfast after TF administration.  Patient having difficulty drinking liquids as will chew on cups or straws.  Patient's father bought sippy cup for patient to try and seems to do well with it. Patient will drink 16 oz fluid (orally) water and milk.  Patient receiving 3 (60 mL) water flushes 8 times per day.   asking if  "evening flushes can be changed due to prevent waking patient 3 times per night.        7/19 No calorie count provided by assisted.  Diet recall past 24 hours: scrambled egg and yogurt; tuna sandwich, banana and water; spaghetti with shrimp, orange and milk-drank 25%.  Patient continues to drink with sippy cup.  Patient's father states can drink entire sippy cup (8-10 oz in 1 hour). Staff members don't feel patient will drink more than 8 oz per day from sippy cup.  Patient does not eat full breakfast due to previous vomiting after the TF.  Per father, patient working with speech therapist, PT and OT weekly.     8/2/2021 Patient's step mother states that patient will drink entire sippy cup of 8 oz if someone helps him.  José Miguel, assisted director, states did not receive order to reduce TF so has continued to run at 70 mL/hr x 11 hours. Patient having regular daily stools.       Diet Recall:  Breakfast: oatmeal   Lunch: 45-50%-turkey sandwich pudding/applesauce + fruit (fresh fruit-chevy or canned fruit)   Dinner: spaghetti  Snack: cookie or pudding   Beverages: water     Exercise: Patient working with PT and OT.      NUTRITION FOCUSED PHYSICAL ASSESSMENT (NFPA) FOR DIAGNOSING MALNUTRITION  Yes         Observed:   No nutrition-related physical findings observed     Obtained from Chart/Interdisciplinary Team:  Non-healing wound near G tube      LABS  Labs reviewed     MEDICATIONS/SUPPLEMENTS  Medications/supplements reviewed-no MVI      ANTHROPOMETRICS   Height: 5'8\"  Weight: 177.2 lbs (8/2/2021) 184.6 lbs (7/19/2021) per group home staff; 180.4 lbs 7/5/2021 (patient had not been weighed in 4 months)   153 lbs (2/2/2021) 239 lbs (2/19/2021) weighed in wheelchair  BMI (kg/m2): 28.1 kg/m2  Weight Status:  Overweight   %IBW: 120%  Weight History: 2/19/2021 taken in MD office with patient in wheelchair. Patient with 15% weight gain in 6 months. Patient with 4% weight loss in 2 weeks.    Wt Readings from Last 10 " Encounters:   08/02/21 80.4 kg (177 lb 3.2 oz)   07/19/21 83.7 kg (184 lb 9.6 oz)   07/05/21 81.8 kg (180 lb 6.4 oz)   02/19/21 108.4 kg (239 lb)   02/02/21 69.4 kg (153 lb)   02/02/21 69.4 kg (153 lb)   01/26/21 69.9 kg (154 lb 3.2 oz)   01/21/21 67.3 kg (148 lb 6.4 oz)   01/19/21 68.6 kg (151 lb 3.2 oz)   01/19/21 54.9 kg (121 lb)      ASSESSED NUTRITION NEEDS  Estimated Energy Needs: 5318-4543 kcals/day (25-30 Kcal/Kg)  Justification:  (maintenance)  Estimated Protein Needs:  grams protein/day (1.2-1.5 g pro/Kg)  Justification:  (preservation of lean body mass)  Estimated Fluid Needs: 1951-7698 mL/day (30-35 mL/kg)      NUTRITION SUPPORT ENTERAL:  Type of Feeding Tube: G tube   Enteral Frequency:  Cyclic  Enteral Regimen: Isosource 1.5   Total Enteral Provisions: Isosource 1.5 at 70 mL x 11 hours (8 PM-7 AM) = 1155 kcals (14 kcals/kg ABW) 52 gm protein 12 grams fiber 585 free fluid   1440 water flush + 585 free water from TF + 480 mL oral fluids = 2505 mL (31 mL/kg ABW)  Adjust fluid flushes 180 mL water flush 8 times per day   180 mL water flush before and after tube feeding (8 PM and 7 AM)  180 mL water flush 9 AM, 11 AM, 1 PM, 3 PM, 5 PM, 7 PM      Recommend reduction in TF by 50% (Isosource 1.5 at 35 mL/hr x 11 hours -7 PM - 8 AM) to provide 575 calories, 26 grams protein, 292 mL free fluid and 6 grams fiber if weigh stabilizes   Continue current fluid flushes      ASSESSED MALNUTRITION STATUS  % Weight Loss:  None noted  % Intake:  No decreased intake noted  Subcutaneous Fat Loss:  None observed  Loss of Muscle Mass:  None observed  Fluid Retention:  None noted     Malnutrition Diagnosis:  Patient does not meet two of the above criteria necessary for diagnosing malnutrition     EVALUATION/PROGRESS TOWARDS GOALS  Previous nutrition goals:  Isosource 1.5 at 35 mL/hr x 11 hours (8 PM-7AM)-not met as order not received  180 mL water flush at 8 PM and 7 AM (before and after TF administration)-met   180 mL  water flush 9 AM, 11 PM, 1 PM, 3 PM, 5 PM, 7 PM-met     Previous nutrition diagnosis:  Excessive nutrient intake related to G-tube feeding and oral intake as evidenced by no reduction in TF since started po intake and 15% weight gain -no change, modified below      Current nutrition diagnosis: Excessive nutrient intake related to G-tube feeding and oral intake as evidenced by 15% weight gain in 4 months and 2.5% weight gain in 2 weeks      INTERVENTIONS   Nutrition Prescription   Recommend 5 day calorie count to assess oral intake to wean TF     IMPLEMENTATION   Assessed learning needs and learning preference  Teaching Method(s) used: Explanation  Vitamin and Mineral Supplements: suggest complete multivitamin and mineral once weaned off TF  Diet Education:  Provided education on calorie intake   Nutrition Education (Content):              a)  Discussed progress towards goals.  Reviewed intake as patient eating 100% of provided meals (2 per day). Patient's TF did not get reduced to 35 mL/hr as no order received.  Recommend continue current regimen since patient had 4% weight loss in 2 weeks.  Staff member does not state that patient had any edema that was lost during this time.  Will hold off on TF reduction since weight decreased in the past weeks.  Appreciate consistent weight on patient as had previously not been weighed since February 2021.  Patient's family and staff member verbalizes understanding of next steps.  Expected patient engagement: N/A as non verbal      GOALS  Weekly weight  Calorie count for 7 days (portion provided and amount eaten)  Isosource 1.5 at 70 mL/hr x 11 hours (8 PM-7AM)  180 mL water flush at 8 PM and 7 AM (before and after TF administration)  180 mL water flush 9 AM, 11 PM, 1 PM, 3 PM, 5 PM, 7 PM     FOLLOW UP/MONITORING   Progress towards goals will be monitored and evaluated per protocol and Practice Guidelines  Patient to follow up in 2 weeks  RD name and number provided      Time  Spent with Patient  21 minutes     Lauryn Licona, RD, LD  [facility] Outpatient Dietitian  [phone] (office phone)

## 2021-08-06 ENCOUNTER — MEDICAL CORRESPONDENCE (OUTPATIENT)
Dept: HEALTH INFORMATION MANAGEMENT | Facility: CLINIC | Age: 41
End: 2021-08-06

## 2021-08-10 ENCOUNTER — MEDICAL CORRESPONDENCE (OUTPATIENT)
Dept: HEALTH INFORMATION MANAGEMENT | Facility: CLINIC | Age: 41
End: 2021-08-10

## 2021-08-10 DIAGNOSIS — Z53.9 DIAGNOSIS NOT YET DEFINED: Primary | ICD-10-CM

## 2021-08-10 PROCEDURE — G0179 MD RECERTIFICATION HHA PT: HCPCS | Performed by: INTERNAL MEDICINE

## 2021-08-12 ENCOUNTER — VIRTUAL VISIT (OUTPATIENT)
Dept: PSYCHIATRY | Facility: CLINIC | Age: 41
End: 2021-08-12
Payer: COMMERCIAL

## 2021-08-12 DIAGNOSIS — F34.81 DISRUPTIVE MOOD DYSREGULATION DISORDER (H): Primary | ICD-10-CM

## 2021-08-12 PROCEDURE — 99214 OFFICE O/P EST MOD 30 MIN: CPT | Mod: 95 | Performed by: NURSE PRACTITIONER

## 2021-08-12 RX ORDER — OLANZAPINE 2.5 MG/1
2.5 TABLET, FILM COATED ORAL
Qty: 60 TABLET | Refills: 3 | Status: SHIPPED | OUTPATIENT
Start: 2021-08-12 | End: 2021-10-07
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
## 2021-08-12 NOTE — PROGRESS NOTES
________________________________________  Medications Phoned  to Pharmacy [] yes [x]no  Name of Pharmacist:  List Medications, including dose, quantity and instructions    Medications ordered this visit were e-scribed.  Verified by order class [x] yes  [] no  Zyprexa 2.5 mg    Medication changes or discontinuations were communicated to patient's pharmacy: [] yes  [x] no    Dictation completed at time of chart check: [] yes  [x] no    I have checked the documentation for today s encounters and the above information has been reviewed and completed.

## 2021-08-18 ENCOUNTER — HOSPITAL ENCOUNTER (OUTPATIENT)
Dept: NUTRITION | Facility: CLINIC | Age: 41
Discharge: HOME | End: 2021-08-18
Attending: DIETITIAN, REGISTERED | Admitting: DIETITIAN, REGISTERED
Payer: COMMERCIAL

## 2021-08-18 VITALS — BODY MASS INDEX: 27.06 KG/M2 | WEIGHT: 178 LBS

## 2021-08-18 PROCEDURE — 97803 MED NUTRITION INDIV SUBSEQ: CPT | Mod: 95 | Performed by: DIETITIAN, REGISTERED

## 2021-08-19 ENCOUNTER — PATIENT OUTREACH (OUTPATIENT)
Dept: CARE COORDINATION | Facility: CLINIC | Age: 41
End: 2021-08-19

## 2021-08-19 ENCOUNTER — MYC MEDICAL ADVICE (OUTPATIENT)
Dept: PSYCHIATRY | Facility: CLINIC | Age: 41
End: 2021-08-19

## 2021-08-19 DIAGNOSIS — F09 COGNITIVE DISORDER: Primary | ICD-10-CM

## 2021-08-19 DIAGNOSIS — S39.92XS BACK INJURY, SEQUELA: ICD-10-CM

## 2021-08-19 DIAGNOSIS — S06.9XAA TRAUMATIC BRAIN INJURY (H): Primary | ICD-10-CM

## 2021-08-19 NOTE — TELEPHONE ENCOUNTER
Replied via [employer] & forwarded to provider for review.    Diana Jason RN on 8/19/2021 at 9:10 AM

## 2021-08-19 NOTE — PROGRESS NOTES
Clinic Care Coordination Contact  Care Team Conversations    Incoming call from Hedy Armstrong PT [facility]     Requesting CC RN's stating she is wrapping up PT services and request assitance for future rehabilitation plan    Patient has a future [facility] rehabilitation assessment August 30/2021  Patient lives at the  [facility]   Patients father Gerald is the patients court appointed Guardian and CC RN left a message on soraya VM to return call and discuss additional information and to introduce Candace stanley RN CC for [facility] Care Coordination Contact  [facility]/Voicemail    Referral Source: Care Team  Clinical Data: Care Coordinator Outreach  Outreach attempted x 1.  Left message on Gerald/ voicemail with call back information and requested return call.  Plan: . Care Coordinator will try to reach patient again in 1-2 business days.  [facility]   Valentine Braun RN, Care Coordinator   [facility]'s   E-mail [email or web address]   [phone]

## 2021-08-20 ENCOUNTER — PATIENT OUTREACH (OUTPATIENT)
Dept: NURSING | Facility: CLINIC | Age: 41
End: 2021-08-20
Payer: COMMERCIAL

## 2021-08-20 ENCOUNTER — TELEPHONE (OUTPATIENT)
Dept: PSYCHIATRY | Facility: CLINIC | Age: 41
End: 2021-08-20

## 2021-08-20 DIAGNOSIS — S06.9XAA TRAUMATIC BRAIN INJURY (H): Primary | ICD-10-CM

## 2021-08-20 SDOH — HEALTH STABILITY: PHYSICAL HEALTH: ON AVERAGE, HOW MANY DAYS PER WEEK DO YOU ENGAGE IN MODERATE TO STRENUOUS EXERCISE (LIKE A BRISK WALK)?: 0 DAYS

## 2021-08-20 SDOH — ECONOMIC STABILITY: FOOD INSECURITY: WITHIN THE PAST 12 MONTHS, THE FOOD YOU BOUGHT JUST DIDN'T LAST AND YOU DIDN'T HAVE MONEY TO GET MORE.: NEVER TRUE

## 2021-08-20 SDOH — HEALTH STABILITY: PHYSICAL HEALTH: ON AVERAGE, HOW MANY MINUTES DO YOU ENGAGE IN EXERCISE AT THIS LEVEL?: 0 MIN

## 2021-08-20 SDOH — ECONOMIC STABILITY: TRANSPORTATION INSECURITY
IN THE PAST 12 MONTHS, HAS THE LACK OF TRANSPORTATION KEPT YOU FROM MEDICAL APPOINTMENTS OR FROM GETTING MEDICATIONS?: NO

## 2021-08-20 SDOH — ECONOMIC STABILITY: TRANSPORTATION INSECURITY
IN THE PAST 12 MONTHS, HAS LACK OF TRANSPORTATION KEPT YOU FROM MEETINGS, WORK, OR FROM GETTING THINGS NEEDED FOR DAILY LIVING?: NO

## 2021-08-20 SDOH — ECONOMIC STABILITY: FOOD INSECURITY: WITHIN THE PAST 12 MONTHS, YOU WORRIED THAT YOUR FOOD WOULD RUN OUT BEFORE YOU GOT MONEY TO BUY MORE.: NEVER TRUE

## 2021-08-20 ASSESSMENT — SOCIAL DETERMINANTS OF HEALTH (SDOH)
DO YOU BELONG TO ANY CLUBS OR ORGANIZATIONS SUCH AS CHURCH GROUPS UNIONS, FRATERNAL OR ATHLETIC GROUPS, OR SCHOOL GROUPS?: NO
HOW OFTEN DO YOU GET TOGETHER WITH FRIENDS OR RELATIVES?: THREE TIMES A WEEK
HOW OFTEN DO YOU ATTENT MEETINGS OF THE CLUB OR ORGANIZATION YOU BELONG TO?: NEVER
HOW HARD IS IT FOR YOU TO PAY FOR THE VERY BASICS LIKE FOOD, HOUSING, MEDICAL CARE, AND HEATING?: NOT HARD AT ALL
IN A TYPICAL WEEK, HOW MANY TIMES DO YOU TALK ON THE PHONE WITH FAMILY, FRIENDS, OR NEIGHBORS?: NEVER

## 2021-08-20 ASSESSMENT — ACTIVITIES OF DAILY LIVING (ADL)
DEPENDENT_IADLS:: CLEANING;COOKING;MONEY MANAGEMENT;SHOPPING;LAUNDRY;INCONTINENCE;MEDICATION MANAGEMENT;MEAL PREPARATION;TRANSPORTATION

## 2021-08-20 NOTE — PROGRESS NOTES
Clinic Care Coordination Contact    Clinic Care Coordination Contact  OUTREACH    Referral Information:  Referral Source: Care Team  8/19/2021 Care coordination referral placed   Hedy Armstrong PT/ [facility] notified CC RN to assist with future rehabilitation Plan    Primary Diagnosis: Neurological Disorders    Chief Complaint   Patient presents with     Clinic Care Coordination - Initial     Clinic Care Coordination RN         Clinic Utilization  Difficulty keeping appointments:: No  Compliance Concerns: No  No-Show Concerns: No  No PCP office visit in Past Year: No  Utilization    Hospital Admissions  0             ED Visits  3             No Show Count (past year)  1                Current as of: 8/20/2021 11:02 AM            Clinical Concerns:  Current Medical Concerns:    CC spoke to Father and stepmother over the phone today    Father reports the patient had a heart attack May 17,2020 and experienced a TBI due to lack of oxygen     Patient had hospital admissions at [facility] and a TCU stay at [facility]    Patient continues home care services OT/PT      Patient can walk with assistance and is not stable with a cane or a walker   Patient has regained some of his physical strength in his legs.    Continues to have balance issues   Patient has some issues with his hands at times hard to open his hands     Patient has some cognitive problems  Patient is incontinent of stool and urine     Parents have a town home in [place] and a cabin in [place]     Parents visit the patient in the [facility] group home 2-3 times a week    Father wants to know how far the patient can improve with therapies.    Patient has a Rehabilitation assessment 8/30 with [facility] Medical provider and might need another further evaluation    Patient has a future appointment with a Dr. Horton/ Neurologist in September and would like an earlier appointment  CC RN will call and see if an earlier appointment  could be made     Patient has a CADI CM and CC RN will give CC RN contact information for any future needs     Current Behavioral Concerns: Patient was just released from ST  Patient can say a word here and there and randomly speeaks in complete sentences   Patient has had uncontrolled behavioral issues in the past .     Education Provided to patient: CC RN role introduced and informed father Candace Perez will be the CC RN   Pain  Pain (GOAL):: No  Health Maintenance Reviewed: Not assessed  Clinical Pathway: None    Medication Management:  Medications are administered by group home staff   Functional Status:  Dependent ADLs::  (Ambulation with assitance)  Dependent IADLs:: Cleaning, Cooking, Money Management, Shopping, Laundry, Incontinence, Medication Management, Meal Preparation, Transportation  Bed or wheelchair confined:: No  Mobility Status: Dependent/Assisted by Another    Living Situation:  Type of residence:: Group home    Lifestyle & Psychosocial Needs:    Social Determinants of Health     Tobacco Use: Medium Risk     Smoking Tobacco Use: Former Smoker     Smokeless Tobacco Use: Never Used   Alcohol Use:      Frequency of Alcohol Consumption:      Average Number of Drinks:      Frequency of Binge Drinking:    Financial Resource Strain: Low Risk      Difficulty of Paying Living Expenses: Not hard at all   Food Insecurity: No Food Insecurity     Worried About Running Out of Food in the Last Year: Never true     Ran Out of Food in the Last Year: Never true   Transportation Needs: No Transportation Needs     Lack of Transportation (Medical): No     Lack of Transportation (Non-Medical): No   Physical Activity: Inactive     Days of Exercise per Week: 0 days     Minutes of Exercise per Session: 0 min   Stress:      Feeling of Stress :    Social Connections: Unknown     Frequency of Communication with Friends and Family: Never     Frequency of Social Gatherings with Friends and Family: Three times a week      Attends Orthodoxy Services: Not on file     Active Member of Clubs or Organizations: No     Attends Club or Organization Meetings: Never     Marital Status: Not on file   Intimate Partner Violence:      Fear of Current or Ex-Partner:      Emotionally Abused:      Physically Abused:      Sexually Abused:    Depression: Not at risk     PHQ-2 Score: 0   Housing Stability:      Unable to Pay for Housing in the Last Year:      Number of Places Lived in the Last Year:      Unstable Housing in the Last Year:      Inadequate nutrition (GOAL):: No  Inadequate activity/exercise (GOAL):: Yes  Significant changes in sleep pattern (GOAL): No  Transportation means:: Medical transport     Orthodoxy or spiritual beliefs that impact treatment:: No  Mental health DX:: Yes  Mental health DX how managed:: Medication  Mental health management concern (GOAL):: No  Chemical Dependency Status: No Current Concerns  Informal Support system:: Parent           Resources and Interventions:  Current Resources:   Skilled Home Care Services: Physicial Therapy  Community Resources: Home Care  Supplies used at home:: Incontinence Supplies  Equipment Currently Used at Home: wheelchair, manual, shower chair  Employment Status: disabled         Advance Care Plan/Directive  Advanced Care Plans/Directives on file:: Yes  Type Advanced Care Plans/Directives: Advanced Directive - On File    Referrals Placed: None     Goals:   Goals        General     Functional (pt-stated)      Notes - Note created  8/20/2021  1:31 PM by Valentine Braun RN     Goal Statement: Patient will have a plan for future rehabilitation services after home care PT ends   Date Goal set: 8/20/2021  Barriers: Deconditioned   Strengths:Supportive parents   Date to Achieve By: 10/20/2021  Patient expressed understanding of goal: Father agrees with the plan   Action steps to achieve this goal:  1. I will keep future Neurology and [facility] Rehabilitation assessment 8/30/2021  appointments   3. Care coordinator will collaborate with physical therapist ,parents ,[facility] /Group home staff for problem solving and updates             Patient/Caregiver understanding: Father agrees with the plan discussed today     Outreach Frequency: weekly  Future Appointments              In 2 weeks Elsa Rahman NP [facility]    In 3 weeks Carlene Crawford, RD, LD [facility]          Plan:   1. CC will call [facility] of Neurology and attempt to get an earlier appointment   2. Patient will keep future Neurology and [facility] Rehabilitation Assessment 8/30/2021  3. CC RN will follow up after [facility] Assessment 8/30/2021    [facility]   Valentine Braun RN, Care Coordinator   [facility]'s   E-mail [email or web address]   [phone].

## 2021-08-20 NOTE — PROGRESS NOTES
Ruby care coordinator from Dr Horton/Neurologist office   Not able to get the patient in any sooner. Dr Horton is out until September 7.  Suggested possible [facility] Neurology referral after his August 30 Rehabilitation assessment .  Patients father informed and we will talk after the August 30 assessment   [facility]   Valentine Braun RN, Care Coordinator   [facility]'s   E-mail [email or web address]   [phone]

## 2021-08-20 NOTE — TELEPHONE ENCOUNTER
No HUSEYIN on file so staff cannot contact Group Home for further details. Will await another call or notification from pharmacy. This RN reviewed chart and no meds appear in need of refill at this time.    Diana Jason RN on 8/20/2021 at 1:18 PM

## 2021-08-20 NOTE — TELEPHONE ENCOUNTER
Per provider:    Elsa Rahman, NP  Psych Rn - [facility] 32 minutes ago (10:07 AM)   [place]  Referral made to Neurology - please let Pilo know and have him call the clinic to set up appointment for Jason        Called pt and informed them of referral. Gave information for [facility]. Guardian (Dad) said he would call and schedule today.      Margarette Roque on 8/20/2021 at 10:43 AM

## 2021-09-01 DIAGNOSIS — G25.9 EXTRAPYRAMIDAL RIGIDITY: ICD-10-CM

## 2021-09-01 DIAGNOSIS — F34.81 DISRUPTIVE MOOD DYSREGULATION DISORDER (H): ICD-10-CM

## 2021-09-01 RX ORDER — VALPROIC ACID 250 MG/5ML
500 SOLUTION ORAL 2 TIMES DAILY
Qty: 600 ML | Refills: 1 | Status: SHIPPED | OUTPATIENT
Start: 2021-09-01 | End: 2021-11-03

## 2021-09-01 NOTE — TELEPHONE ENCOUNTER
Date of Last Office Visit: 08/12/2021  Date of Next Office Visit: 09/09/2021  No shows since last visit: 0  Cancellations since last visit: 0    Medication requested: Valproate Sodium (VALPROIC ACID) 250 MG/5ML Date last ordered: 07/14/2021 Qty: 600mL Refills: 1     Medication requested:Amantadine 50mg/5mL Date last ordered: 08/09/2021 Qty: 600mL Refills: 0    Review of [place] ?: n/a    Lapse in medication adherence greater than 5 days?: no  If yes, call patient and gather details: n/a  Medication refill request verified as identical to current order?: yes  Result of Last DAM, VPA, Li+ Level, CBC, or Carbamazepine Level (at or since last visit): N/A    []Medication refilled per  Medication Refill in Ambulatory Care  policy.  [x]Medication unable to be refilled by RN due to criteria not met as indicated below:    []Eligibility - not seen in the last year   []Supervision - no future appointment   []Compliance - no shows, cancellations or lapse in therapy   []Verification - order discrepancy   []Controlled medication   [x]Medication not included in policy   []90-day supply request   []Other

## 2021-09-09 ENCOUNTER — VIRTUAL VISIT (OUTPATIENT)
Dept: PSYCHIATRY | Facility: CLINIC | Age: 41
End: 2021-09-09
Payer: COMMERCIAL

## 2021-09-09 ENCOUNTER — TELEPHONE (OUTPATIENT)
Dept: INTERNAL MEDICINE | Facility: CLINIC | Age: 41
End: 2021-09-09

## 2021-09-09 ENCOUNTER — PATIENT OUTREACH (OUTPATIENT)
Dept: CARE COORDINATION | Facility: CLINIC | Age: 41
End: 2021-09-09

## 2021-09-09 DIAGNOSIS — S06.9X9S TRAUMATIC BRAIN INJURY WITH LOSS OF CONSCIOUSNESS, SEQUELA (H): Primary | ICD-10-CM

## 2021-09-09 DIAGNOSIS — F34.81 DISRUPTIVE MOOD DYSREGULATION DISORDER (H): ICD-10-CM

## 2021-09-09 DIAGNOSIS — G25.9 EXTRAPYRAMIDAL RIGIDITY: ICD-10-CM

## 2021-09-09 PROCEDURE — 99214 OFFICE O/P EST MOD 30 MIN: CPT | Mod: 95 | Performed by: NURSE PRACTITIONER

## 2021-09-09 RX ORDER — BENZTROPINE MESYLATE 0.5 MG/1
0.5 TABLET ORAL 2 TIMES DAILY
Qty: 60 TABLET | Refills: 1 | Status: CANCELLED | OUTPATIENT
Start: 2021-09-09

## 2021-09-09 RX ORDER — LORAZEPAM 0.5 MG/1
0.5 TABLET ORAL
Qty: 60 TABLET | Refills: 1 | Status: CANCELLED | OUTPATIENT
Start: 2021-09-09

## 2021-09-09 NOTE — NURSING NOTE
Dialed pt x 2 but no answer. Our screen is showing that the pt is ready for appt on my chart. LM asking to complete PHQ and CLEMENT.    ________________________________________  Medications Phoned  to Pharmacy [] yes [x]no  Name of Pharmacist:  List Medications, including dose, quantity and instructions    Medications ordered this visit were e-scribed.  Verified by order class [] yes  [x] no  Orders for Cogentin, Ativan, and amantadine pended for provider to review and sign   Medication changes or discontinuations were communicated to patient's pharmacy: [] yes  [x] no    Dictation completed at time of chart check: [] yes  [x] no    I have checked the documentation for today s encounters and the above information has been reviewed and completed.

## 2021-09-09 NOTE — TELEPHONE ENCOUNTER
Unclear if this can be done with a verbal order as I do not know how to place these orders to Sister Alan specifically.  It would be very helpful if these orders are placed with specifics in regards to what type of PT, OT and speech therapy are needed and then they can be cosigned.  Thanks
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
## 2021-09-09 NOTE — TELEPHONE ENCOUNTER
Dr. Kahn,    [facility] calling to request:     PT/OT/ST eval and treat sent to [facility] ( neuro program. )     Fax- [phone]    Can we leave a detailed message on this number? YES  Phone number patient can be reached at: Other phone number:  Hedy- [phone]    Ling Beasley RN  [facility] Triage

## 2021-09-09 NOTE — PROGRESS NOTES
Clinic Care Coordination Contact  [facility]/Voicemail       Clinical Data: Care Coordinator Outreach  Outreach attempted x 1.  Left message on patient's voicemail with call back information and requested return call.  Plan: Care Coordinator will try to reach patient again in 7-10 business days.    RNCC following up on rehab assessment for possible inpatient Physical Therapy/OT.    [facility]   Candcae Perez RN, Care Coordinator   [facility], [facility] [place]  E-mail [email or web address]   [phone]

## 2021-09-13 ENCOUNTER — HOSPITAL ENCOUNTER (OUTPATIENT)
Dept: NUTRITION | Facility: CLINIC | Age: 41
Discharge: HOME | End: 2021-09-13
Attending: DIETITIAN, REGISTERED | Admitting: DIETITIAN, REGISTERED
Payer: COMMERCIAL

## 2021-09-13 ENCOUNTER — TELEPHONE (OUTPATIENT)
Dept: INTERNAL MEDICINE | Facility: CLINIC | Age: 41
End: 2021-09-13

## 2021-09-13 VITALS — WEIGHT: 183.2 LBS | BODY MASS INDEX: 27.86 KG/M2

## 2021-09-13 PROCEDURE — 97803 MED NUTRITION INDIV SUBSEQ: CPT | Mod: GT | Performed by: DIETITIAN, REGISTERED

## 2021-09-13 NOTE — PROGRESS NOTES
Video-Visit Details    Type of service:  Video Visit    Video Start Time (time video started): 2:48    Video End Time (time video stopped): 3:10    Originating Location (pt. Location): Home    Distant Location (provider location):  [facility] NUTRITION SERVICES     Mode of Communication:  Video Conference via [facility]    OUTPATIENT NUTRITION ASSESSMENT (VIDEO VISIT DUE TO COVID-19)   REASON FOR ASSESSMENT  Scot Bishop referred by Dr. Morales for MNT related to          Traumatic brain injury with loss of consciousness, sequela (H) [S06.9X9S]  - Primary       G tube feedings (H) [Z93.1]                 Patient accompanied by Gerald, father; Audrey, step mother; staff members, José Miguel        ASSESSMENT   Nutrition History:  - Information obtained from father, step mom and staff worker.  Patient's step mom describes patient's eating habits as limited food choices with hamburgers and pizza but now eats all foods provided to him.  Patient's father states that when they feed him, patient will eat most of his meal.  Priyanka, staff worker, states patient eats 45-50% of meals.  Patient currently living at [facility] in [place].  Patient has 7 year old son.      Patient's father states patient's  lbs. Patient is on a regular diet with thin liquids.  Patient is a total feed.     Patient has regular formed bowel movements either daily or every other day.       7/5/2021 Received calorie count from [facility] home.  Patient eating 100% of meals.  Patient receiving 2 meals per day as  states patient will vomit if given breakfast after TF administration.  Patient having difficulty drinking liquids as will chew on cups or straws.  Patient's father bought sippy cup for patient to try and seems to do well with it. Patient will drink 16 oz fluid (orally) water and milk.  Patient receiving 3 (60 mL) water flushes 8 times per day.   asking if evening flushes can be changed  due to prevent waking patient 3 times per night.        7/19 No calorie count provided by [facility].  Diet recall past 24 hours: scrambled egg and yogurt; tuna sandwich, banana and water; spaghetti with shrimp, orange and milk-drank 25%. Patient continues to drink with sippy cup.  Patient's father states can drink entire sippy cup (8-10 oz in 1 hour). Staff members don't feel patient will drink more than 8 oz per day from sippy cup. Patient does not eat full breakfast due to previous vomiting after the TF.  Per father, patient working with speech therapist, PT and OT weekly.      8/2/2021 Patient's step mother states that patient will drink entire sippy cup of 8 oz if someone helps him.  José Miguel, [facility] director, [facility] did not receive order to reduce TF so has continued to run at 70 mL/hr x 11 hours. Patient having regular daily stools.       8/18/2021 Received food log from [facility].  Patient eating 2-3 meals + 1-2 snacks (yogurt or applesauce).  Pancakes for breakfast.  Lunch -turkey sandwich or spaghetti with meat sauce Dinner: chicken vale.  Parents state patient can drink liquids when continually offered.  Father asking if patient can use different cup to increase fluid intake.      9/13/2021 Patient continues on current TF regimen of Isosource 1.5 @ 70 mL/hr x 11 hours.  Staff have been increasing protein intake in diet to help improve protein intake.  Note patient to participate in [employer] for OT/PT/ST.  Staff using syringe to provide fluid to remove food from teeth and oral cares.  Patient given water through syringe for fluids also.        Diet Recall:  Breakfast: 3 waffles with 2 scrambled eggs + 4 oz milk (18 grams protein)  Lunch: chicken sandwich with 4 oz water (24 grams protein)   Dinner: tuna pasta salad with 1 garlic bread + 4 oz water (15 grams protein)   Snack: chips; sugar free pudding (4 grams protein)   Beverages: water    8 day average protein intake 52 grams protein  "     Exercise: Patient working with PT and OT.      NUTRITION FOCUSED PHYSICAL ASSESSMENT (NFPA) FOR DIAGNOSING MALNUTRITION  Yes         Observed:   No nutrition-related physical findings observed     Obtained from Chart/Interdisciplinary Team:  Non-healing wound near G tube      LABS  Labs reviewed     MEDICATIONS/SUPPLEMENTS  Medications/supplements reviewed-no MVI      ANTHROPOMETRICS   Height: 5'8\"  Weight: 183.2 lbs (9/13/2021)  BMI (kg/m2): 27.8 kg/m2  Weight Status:  Overweight   %IBW: 118%  Weight History: 2/19/2021 taken in MD office with patient in wheelchair. Patient with 17% weight gain in 7 months.  Wt Readings from Last 10 Encounters:   09/13/21 83.1 kg (183 lb 3.2 oz)   08/18/21 80.7 kg (178 lb)   08/02/21 80.4 kg (177 lb 3.2 oz)   07/19/21 83.7 kg (184 lb 9.6 oz)   07/05/21 81.8 kg (180 lb 6.4 oz)   02/19/21 108.4 kg (239 lb)   02/02/21 69.4 kg (153 lb)   02/02/21 69.4 kg (153 lb)   01/26/21 69.9 kg (154 lb 3.2 oz)   01/21/21 67.3 kg (148 lb 6.4 oz)      ASSESSED NUTRITION NEEDS  Estimated Energy Needs: 3104-9910 kcals/day (25-30 Kcal/Kg)  Justification:  (maintenance)  Estimated Protein Needs:  grams protein/day (1.2-1.5 g pro/Kg)  Justification:  (preservation of lean body mass)  Estimated Fluid Needs: 9183-8852 mL/day (30-35 mL/kg)      NUTRITION SUPPORT ENTERAL:  Type of Feeding Tube: G tube   Enteral Frequency:  Cyclic  Enteral Regimen: Isosource 1.5   Total Enteral Provisions: Isosource 1.5 at 70 mL x 11 hours (8 PM-7 AM) = 1155 kcals (14 kcals/kg ABW) 52 gm protein 12 grams fiber 585 free fluid   1440 water flush + 585 free water from TF + 480 mL oral fluids = 2505 mL (31 mL/kg ABW)  Adjust fluid flushes 180 mL water flush 8 times per day   180 mL water flush before and after tube feeding (8 PM and 7 AM)  180 mL water flush 9 AM, 11 AM, 1 PM, 3 PM, 5 PM, 7 PM      Recommend reduction in TF by 50% (Isosource 1.5 at 35 mL/hr x 11 hours -7 PM - 8 AM) to provide 575 calories, 26 grams " protein, 292 mL free fluid and 6 grams fiber if weigh stabilizes   Continue current fluid flushes      ASSESSED MALNUTRITION STATUS  % Weight Loss:  None noted  % Intake:  No decreased intake noted  Subcutaneous Fat Loss:  None observed  Loss of Muscle Mass:  None observed  Fluid Retention:  None noted     Malnutrition Diagnosis:  Patient does not meet two of the above criteria necessary for diagnosing malnutrition     EVALUATION/PROGRESS TOWARDS GOALS  Previous nutrition goals:  Weekly weight-improving   Calorie count for 7 days (portion provided and amount eaten)-improving (did not provide quantity protein)   Isosource 1.5 at 70 mL/hr x 11 hours (8 PM-7AM)-met  180 mL water flush at 8 PM and 7 AM (before and after TF administration)-met  180 mL water flush 9 AM, 11 PM, 1 PM, 3 PM, 5 PM, 7 PM-met  Add protein source at breakfast-improving   Add protein bar daily as snack-not met   Measure protein source at lunch and dinner-improving     Previous nutrition diagnosis:  Excessive nutrient intake related to G-tube feeding and oral intake as evidenced by no reduction in TF since started po intake and 15% weight gain -no change, modified below      Current nutrition diagnosis: Excessive nutrient intake related to G-tube feeding and oral intake as evidenced by 17% weight gain in 7 months      INTERVENTIONS   Nutrition Prescription   Recommend 5 day calorie count to assess oral intake to wean TF; add protein source at breakfast; protein bar as snack      IMPLEMENTATION   Assessed learning needs and learning preference  Teaching Method(s) used: Explanation  Vitamin and Mineral Supplements: suggest complete multivitamin and mineral once weaned off TF  Diet Education:  Provided education on calorie intake   Nutrition Education (Content):              a)  Discussed progress towards goals.  Reviewed food log.  It appears patient consumes <52 grams protein orally.  Patient's TF remains at 70 mL/hr.  Recommend reduce TF rate to  35 mL/hr x 11 hours.  Appreciate consistent weight on patient as had previously not been weighed since February 2021. Explained to parents and staff that fluid needs are the biggest challenge to remove TF as patient cannot currently meet fluid needs orally.  Note orders to work with speech therapist.  Speech therapist will need to evaluate safety for fluids using cup and syringe.  Patient's family and staff member verbalizes understanding of next steps.  Other: called MD office for order to be faxed for updated TF request of 35 mL/hr x 11 hours.  Spoke with Matilda  RN to fax order to group home TF order as had been previously ordered but not received by group home.      GOALS  Weekly weight  Calorie count for 7 days (portion provided and amount eaten)  Isosource 1.5 at 35 mL/hr x 11 hours (8 PM-7AM)  180 mL water flush at 8 PM and 7 AM (before and after TF administration)  180 mL water flush 9 AM, 11 PM, 1 PM, 3 PM, 5 PM, 7 PM  Add protein source at breakfast  Measure protein source at lunch and dinner      FOLLOW UP/MONITORING   Progress towards goals will be monitored and evaluated per protocol and Practice Guidelines  Group home staff to call with updated weight after 2 weeks on reduced TF rate   Patient to follow up in 5 weeks  RD name and number provided      Time Spent with Patient  22 minutes     Lauryn Licona, RD, LD  [facility] Outpatient Dietitian  [phone] (office phone)

## 2021-09-16 DIAGNOSIS — F34.81 DISRUPTIVE MOOD DYSREGULATION DISORDER (H): ICD-10-CM

## 2021-09-16 DIAGNOSIS — G25.9 EXTRAPYRAMIDAL RIGIDITY: ICD-10-CM

## 2021-09-16 RX ORDER — BENZTROPINE MESYLATE 0.5 MG/1
0.5 TABLET ORAL 2 TIMES DAILY
Qty: 60 TABLET | Refills: 1 | Status: SHIPPED | OUTPATIENT
Start: 2021-09-16 | End: 2021-10-07 | Stop reason: ALTCHOICE

## 2021-09-16 NOTE — TELEPHONE ENCOUNTER
Date of Last Office Visit: 9/9/2021  Date of Next Office Visit: 10/7/2021  No shows since last visit: none  Cancellations since last visit: none    Medication requested: Lorazepam 0.5 mg tablet Date last ordered: 7/27/2021 Qty: 60 Refills: 1     Medication requested: Benztropine 0.5 mg tablet Date last ordered: 7/27/2021 Qty: 60 Refills: 1    [place] of [place] ?: yes. Request for lorazepam denied since patient picked it up on 9/3/2021-28 days fill.       Lapse in medication adherence greater than 5 days?: no  If yes, call patient and gather details: no  Medication refill request verified as identical to current order?: yes  Result of Last DAM, VPA, Li+ Level, CBC, or Carbamazepine Level (at or since last visit): N/A    Last visit treatment plan: Unable to access.    []Medication refilled per  Medication Refill in Ambulatory Care  policy.  [x]Medication unable to be refilled by RN due to criteria not met as indicated below:    []Eligibility - not seen in the last year   []Supervision - no future appointment   []Compliance - no shows, cancellations or lapse in therapy   []Verification - order discrepancy   [x]Controlled medication   [x]Medication not included in policy   []90-day supply request   []Other

## 2021-09-20 ENCOUNTER — PATIENT OUTREACH (OUTPATIENT)
Dept: CARE COORDINATION | Facility: CLINIC | Age: 41
End: 2021-09-20

## 2021-09-20 NOTE — PROGRESS NOTES
Clinic Care Coordination Contact  [facility]/Voicemail       Clinical Data: Care Coordinator Outreach  Outreach attempted x 1.  Left message on father Daigle's voicemail with call back information and requested return call.  Plan:  Care Coordinator will reach out in the next 7-10 business days.    RNCC reaching out regarding outcome of the [facility] Rehab Assessment for eligibility to an inpatient rehab facility and outcome of [facility] appointment with Dr. Horton.    [facility]   Candace Perez RN, Care Coordinator   [facility] Karla Corozal, [facility], [facility], [place]  E-mail [email or web address]   [phone]

## 2021-09-20 NOTE — TELEPHONE ENCOUNTER
Refill request r'cd from [facility] via fax for Lorazepam 0.5mg denied due to Pended order already in process. Will await provider to sign open pended order.    Diana Jason RN on 9/20/2021 at 10:58 AM

## 2021-09-21 ENCOUNTER — TELEPHONE (OUTPATIENT)
Dept: INTERNAL MEDICINE | Facility: CLINIC | Age: 41
End: 2021-09-21

## 2021-09-21 DIAGNOSIS — G93.1 ANOXIC BRAIN DAMAGE (H): ICD-10-CM

## 2021-09-21 DIAGNOSIS — R13.10 DYSPHAGIA, UNSPECIFIED TYPE: ICD-10-CM

## 2021-09-21 DIAGNOSIS — S06.9X9S TRAUMATIC BRAIN INJURY WITH LOSS OF CONSCIOUSNESS, SEQUELA (H): Primary | ICD-10-CM

## 2021-09-21 NOTE — TELEPHONE ENCOUNTER
[facility] called to state they met with a nutritionist on 9/13 and they were going to send Dr. Morales orders about lowering food intake to review and sign. This nurse was not able to locate orders, [facility] is going to call the nutritionist     Eladio Sullivan RN

## 2021-09-21 NOTE — TELEPHONE ENCOUNTER
Recvd call from Home care  to request referrals to [facility] for PT,OT, Speech.  When complete please fax referrals.  Pended external referrals for review/approval    [phone] - fax, [facility].      Dianne Hassan RN

## 2021-09-24 DIAGNOSIS — G25.9 EXTRAPYRAMIDAL RIGIDITY: ICD-10-CM

## 2021-09-24 NOTE — TELEPHONE ENCOUNTER
Date of Last Office Visit: 08/12/2021  Date of Next Office Visit: 10/07/2021  No shows since last visit: 0  Cancellations since last visit: 1 dt technical problems on 09/09/2021    Medication requested: amantadine (SYMMETREL) 50 MG/5ML  Date last ordered: 09/01/2021 Qty: 600mL Refills: 0     Lapse in medication adherence greater than 5 days?: no  If yes, call patient and gather details: n/a  Medication refill request verified as identical to current order?: yes  Result of Last DAM, VPA, Li+ Level, CBC, or Carbamazepine Level (at or since last visit): N/A    Last visit treatment plan:     1.    Continue valproic acid  500 mg (10 ml) twice daily     2.    Continue benztropine 0.5 mg twice daily     3.    Continue Olanzapine 5 mg twice daily    4.    Increase paroxetine to 30 mg daily    5.    Discontinue quetiapine     6.    Continue amantadine 100 mg twice daily    7.    Lorazepam 0.5 mg twice daily      []Medication refilled per  Medication Refill in Ambulatory Care  policy.  [x]Medication unable to be refilled by RN due to criteria not met as indicated below:    []Eligibility - not seen in the last year   []Supervision - no future appointment   []Compliance - no shows, cancellations or lapse in therapy   []Verification - order discrepancy   []Controlled medication   [x]Medication not included in policy   []90-day supply request   []Other

## 2021-09-27 ENCOUNTER — TELEPHONE (OUTPATIENT)
Dept: NUTRITION | Facility: CLINIC | Age: 41
End: 2021-09-27

## 2021-09-27 ENCOUNTER — TELEPHONE (OUTPATIENT)
Dept: INTERNAL MEDICINE | Facility: CLINIC | Age: 41
End: 2021-09-27

## 2021-09-27 NOTE — PROGRESS NOTES
Returned phone call from José Miguel, group home director, regarding TF order.  José Miguel stated group home did not receive fax for TF order.  Identified that incorrect fax number in system.  Noted correct fax number of [phone].  Called and spoke with Cheyanne triage nurse.  Cheyanne or co-worker to send fax to group home.  Clinic staff to contact group home to verify received order. Patient scheduled with RD 10/19/2021 for TF follow up.     Lauryn Licona, RD, LD  [facility] Outpatient Dietitian  [phone] (office phone)

## 2021-09-27 NOTE — TELEPHONE ENCOUNTER
[facility] (José Miguel) Contact    Attempt # 1    Was call answered?  No.  Left message on voicemail with information to call triage back.    On call back:     - Relay that fax was sent - have they received it?

## 2021-09-27 NOTE — TELEPHONE ENCOUNTER
Call from Carlene Dietician who requests that order be faxed to group home. States they have tried multiple times and it has not been received. (see encounter dated 7/20/2021 for nutrition services)    [facility]   Attn: José Miguel, Facility Director  Fax: [phone]  Ph: [phone]    Triage: Call back to group when faxed to ensure received.

## 2021-09-27 NOTE — TELEPHONE ENCOUNTER
PCP Re-signed order.  See 7/20 KEITH.     RN faxed to [phone]  Correct Fax:  Left [facility] Facility Director a VM message stating new order was faxed.    Marguerite Cuevas MSN, RN   [facility]

## 2021-09-27 NOTE — TELEPHONE ENCOUNTER
PCP:    Please RE-SIGN YOUR APPROVAL to change order for patient's Isosource:    Recommend TF change: Isosource 1.5 @ 35 mL/hr x 11 hours (8 PM-7 PM).    PCP Re-signed.  RN faxed to [phone]    Marguerite Cuevas, MSN, RN   [facility]

## 2021-09-28 DIAGNOSIS — F34.81 DISRUPTIVE MOOD DYSREGULATION DISORDER (H): ICD-10-CM

## 2021-09-28 RX ORDER — LORAZEPAM 0.5 MG/1
0.5 TABLET ORAL
Qty: 60 TABLET | Refills: 1 | Status: SHIPPED | OUTPATIENT
Start: 2021-09-28 | End: 2021-11-22

## 2021-09-28 NOTE — TELEPHONE ENCOUNTER
Reason for Call:  Medication or medication refill:    Do you use a [facility]?  Name of the pharmacy and phone number for the current request:       pharm: [facility]    Name of the medication requested:    LORazepam (ATIVAN) 0.5 MG tablet Take 1 tablet (0.5 mg) by mouth 2 times daily         Other request: pt needs med refilled, pharmacy has no scripts    Can we leave a detailed message on this number? YES    Phone number patient can be reached at:     [facility] #: [phone]    Best Time: asap    Call taken on 9/28/2021 at 4:05 PM by Nadia Marquez

## 2021-09-28 NOTE — TELEPHONE ENCOUNTER
Date of Last Office Visit: 09/09/2021 but possibly didn't connect  Date of Next Office Visit: 10/07/2021  No shows since last visit: 0  Cancellations since last visit: 0    Medication requested: LORazepam (ATIVAN) 0.5 MG  Date last ordered: 07/27/2021 Qty: 60 Refills: 1     [place] of [place] ?: yes  Medication last filled date: 08/17/2021 Qty filled: 56  Other controlled substance on [place] ?: no  If yes, is this a new medication?: n/a  If yes, name of medication: n/a and date filled: n/a    Lapse in medication adherence greater than 5 days?: no  If yes, call patient and gather details: n/a  Medication refill request verified as identical to current order?: yea  Result of Last DAM, VPA, Li+ Level, CBC, or Carbamazepine Level (at or since last visit): N/A    []Medication refilled per  Medication Refill in Ambulatory Care  policy.  [x]Medication unable to be refilled by RN due to criteria not met as indicated below:    []Eligibility - not seen in the last year   []Supervision - no future appointment   []Compliance - no shows, cancellations or lapse in therapy   []Verification - order discrepancy   [x]Controlled medication   []Medication not included in policy   []90-day supply request   []Other

## 2021-09-29 ENCOUNTER — TRANSFERRED RECORDS (OUTPATIENT)
Dept: HEALTH INFORMATION MANAGEMENT | Facility: CLINIC | Age: 41
End: 2021-09-29

## 2021-09-29 NOTE — PATIENT INSTRUCTIONS
1.    Continue valproic acid  500 mg (10 ml) twice daily     2.    Continue benztropine 0.5 mg twice daily     3.    Continue Olanzapine 5 mg twice daily    4.    Increase paroxetine to 30 mg daily    5.    Discontinue quetiapine     6.    Continue amantadine 100 mg twice daily    7.    Lorazepam 0.5 mg twice daily       Continue all other medications as reviewed per electronic medical record today.     Safety plan reviewed. To the Emergency Department as needed or call after hours crisis line at [phone] or [phone]. [place] Crisis Text Line. Text [place] to 203182 or Suicide LifeLine Chat: N-Sided.org/chat/    To schedule individual or family therapy, call [facility] at [phone].    Schedule an appointment with me in October 7 or sooner as needed. Call [facility] at [phone] to schedule.    Follow up with primary care provider as planned or for acute medical concerns.    Call the psychiatric nurse line with medication questions or concerns at [phone].    [employer] may be used to communicate with your provider, but this is not intended to be used for emergencies.    Crisis Resources:    National Suicide Prevention Lifeline: 451.497.3867 (TTY: 384.245.9318). Call anytime for help.  (www.suicidepreventionlifeline.org)  National [place] on Mental Illness (www.melvin.org): 822.655.4763 or 131-049-7165.   Mental Health Association (www.mentalhealth.org): 303.159.1119 or 829-365-6931.  [place] Crisis Text Line: Text [place] to 944417  Suicide LifeLine Chat: N-Sided.org/chat

## 2021-10-01 ENCOUNTER — PATIENT OUTREACH (OUTPATIENT)
Dept: NURSING | Facility: CLINIC | Age: 41
End: 2021-10-01
Payer: COMMERCIAL

## 2021-10-01 NOTE — LETTER
[address] [place] [id number]  Updated 10/1/2021    [facility]  Patient Centered Plan of Care  About Me:        Patient Name:  Scot Bishop    YOB: 1980  Age:         41 year old   Heather MRN:    [id number] Telephone Information:  Home Phone [phone]   Mobile [phone]       Address:  [address] [address] Email address:  [email or web address]      Emergency Contact(s)    Name Relationship Lgl Grd Work Phone Home Phone Mobile Phone   1. MITZY AMOS* Father Yes  [phone]   2. MITZYSUZI* Mother No  [phone]   3. [employer]*    [phone]    4. LAMINE, WALKER *     [phone]   5. BISHOPLOS Stepparent No  [phone]           Primary language:  English     needed? No   [employer]:  [phone] op. 1  Other communication barriers:  (Patient is nonverbal)  Preferred Method of Communication:     Current living arrangement:    Mobility Status/ Medical Equipment:      Health Maintenance  Health Maintenance Reviewed: Not assessed    My Access Plan  Medical Emergency 911   Primary Clinic Line   - [phone]   24 Hour Appointment Line [phone] or  [phone] ([phone]) (toll-free)   24 Hour Nurse Line [phone] (toll-free)   Preferred Urgent Care     Preferred Hospital     Preferred Pharmacy [place], [place] - [address] [place] [address]     Behavioral Health Crisis Line The National Suicide Prevention Lifeline at 1-194.926.7139 or 911             My Care Team Members  Patient Care Team       Relationship Specialty Notifications Start End    Cachorro Morales MD PCP - General Internal Medicine  1/19/21     Phone: [phone] Fax: [phone]         [address] [place] [address]    Scot Matamoros MD MD Cardiology  6/12/20     Phone: [phone] Fax: [phone]          [facility] [address]    Cachorro Morales MD  Assigned PCP   12/24/20     Phone: [phone] Fax: [phone]         [address] [facility] [address]    EVELINA Myles CM    2/5/21     [facility]    Phone: [phone]         José Miguel group home     2/5/21     [facility]    Phone: [phone] Fax: [phone]        Melanie financial worker Financial Resource Worker   2/5/21     [facility]    Phone: [phone]         Elsa Rahman NP Assigned Behavioral Health Provider   2/14/21     Phone: [phone] Fax: [phone]          [facility] [address] [place] [address]    Bibi Marcus MD Assigned Heart and Vascular Provider   3/17/21     Phone: [phone] Pager: [phone] Fax: [phone]        [address] [facility] [address]    Candace Perez RN Lead Care Coordinator  Admissions 8/19/21             My Care Plans  Self Management and Treatment Plan  Goals and (Comments)  Goals        General     Functional (pt-stated)      Notes - Note edited  10/1/2021 10:08 AM by Candace Perez RN     Goal Statement: Patient will have a plan for future rehabilitation services after home care PT ends   Date Goal set: 8/20/2021  Barriers: Deconditioned   Strengths:Supportive parents   Date to Achieve By: 12/20/2021  Patient expressed understanding of goal: Father agrees with the plan   Action steps to achieve this goal:  1. I will keep future Neurology and [facility] Rehabilitation assessment 8/30/2021 resheduled to 10/1 at 1 pm appointments   3. Care coordinator will collaborate with physical therapist ,parents ,County /Group home staff for problem solving and updates              Action Plans on File:                       Advance Care Plans/Directives Type:        My Medical and Care Information  Problem List   Patient Active Problem List   Diagnosis     Cardiac arrest (H)     Traumatic brain injury (H)     Thrush, oral     Yeast infection     Nicotine  dependence     Nonverbal     Gastrojejunostomy tube status (H)     G tube feedings (H)     Advance care planning     Dysphagia     Cognitive disorder     Backache     Back injury, sequela     Agitation     Aggression     Acute respiratory failure with hypoxia (H)     Thrombocytopenia, unspecified (H)      Current Medications and Allergies:  See printed Medication Report.    Care Coordination Start Date: No linked episodes   Frequency of Care Coordination: weekly   Form Last Updated: 10/01/2021

## 2021-10-01 NOTE — PROGRESS NOTES
"Clinic Care Coordination Contact    Follow Up Progress Note      Assessment:  RNCC reached out Sherry, mother, regarding update of [facility] Assessment last month.  Per Sherry, the group home where patient resides was 15 minutes late and was rescheduled to today, 10/01/2021 at 1 pm.  Per Sherry, they really feel Scot would benefit from inpatient rehab vs. Home rehab.  Sherry is hoping \"someone will take him in\".  She asked if Dr. Morales placed a referral or okayed the order for inpatient rehab and this writer explained Dr. Kahn would need the results of the assessment today before he could approve or deny the request.    RNCC strongly encouraged Scot make his appointment today to continue to move forward.  Per Sherry, home Physical Therapist Hedy states he would \"benefit from inpatient rehabilitation\".    Care Gaps:    Health Maintenance Due   Topic Date Due     PREVENTIVE CARE VISIT  Never done     INFLUENZA VACCINE (1) Never done       There are no preventive care reminders to display for this patient.    Goals addressed this encounter:   Goals Addressed                    This Visit's Progress       Functional (pt-stated)   20%      Goal Statement: Patient will have a plan for future rehabilitation services after home care PT ends   Date Goal set: 8/20/2021  Barriers: Deconditioned   Strengths:Supportive parents   Date to Achieve By: 12/20/2021  Patient expressed understanding of goal: Father agrees with the plan   Action steps to achieve this goal:  1. I will keep future Neurology and [facility] assessment 8/30/2021 resheduled to 10/1 at 1 pm appointments   3. Care coordinator will collaborate with physical therapist ,parents ,[facility] /Group home staff for problem solving and updates             Intervention/Education provided during outreach:     RNCC to send updated complex care plan via [employer]. Patient was provided with writers contact information and encouraged to call with " questions, concerns, support needs. RNCC will remain available as needed. RNCC will follow up with patient again in 1 month.      Outreach Frequency: monthly    Care Coordinator Plan:   RNCC will reach out in the next 2-3 business days to follow up on assessment and goal progression    [facility]   Candace Perez RN, Care Coordinator   [facility] [place], Women's Clinic, [facility], [place]  E-mail [email or web address]   [phone]

## 2021-10-07 ENCOUNTER — VIRTUAL VISIT (OUTPATIENT)
Dept: PSYCHIATRY | Facility: CLINIC | Age: 41
End: 2021-10-07
Payer: COMMERCIAL

## 2021-10-07 DIAGNOSIS — F34.81 DISRUPTIVE MOOD DYSREGULATION DISORDER (H): ICD-10-CM

## 2021-10-07 PROCEDURE — 99214 OFFICE O/P EST MOD 30 MIN: CPT | Mod: 95 | Performed by: NURSE PRACTITIONER

## 2021-10-07 RX ORDER — OLANZAPINE 2.5 MG/1
2.5 TABLET, FILM COATED ORAL AT BEDTIME
Qty: 30 TABLET | Refills: 3 | Status: SHIPPED | OUTPATIENT
Start: 2021-10-07 | End: 2021-12-31

## 2021-10-07 NOTE — PATIENT INSTRUCTIONS
1.    Continue valproic acid  500 mg (10 ml) twice daily     2.    Continue benztropine 0.5 mg twice daily     3.    Continue Olanzapine 5 mg twice daily    4.    Continue Paroxetine 30 mg daily    5.    Continue amantadine 100 mg twice daily    6.    Lorazepam 0.5 mg twice daily        Continue all other medications as reviewed per electronic medical record today.     Safety plan reviewed. To the Emergency Department as needed or call after hours crisis line at [phone] or [phone]. [place] Crisis Text [place]. Text [place] to 394672 or Suicide LifeLine Chat: suicideThe 5th Quarter.org/chat/    To schedule individual or family therapy, call [facility] at [phone].    Schedule an appointment with me in October 7 or sooner as needed. Call [facility] at [phone] to schedule.    Follow up with primary care provider as planned or for acute medical concerns.    Call the psychiatric nurse line with medication questions or concerns at [phone].    [employer] - is now ClipCard may be used to communicate with your provider, but this is not intended to be used for emergencies.    Crisis Resources:    National Suicide Prevention Lifeline: 884.874.7915 (TTY: 950.548.9150). Call anytime for help.  (www.suicidepreventionlifeline.org)  National [place] on Mental Illness (www.melvin.org): 520.909.3019 or 033-792-3215.   Mental Health Association (www.mentalhealth.org): 604.787.6659 or 672-523-9440.  [place] Crisis [place]: Text [place] to 625777  Suicide LifeLine Chat: suicideThe 5th Quarter.org/chat

## 2021-10-07 NOTE — PATIENT INSTRUCTIONS
1.    Continue valproic acid  500 mg (10 ml) twice daily     2.    Discontinue benztropine      3.    Decrease Olanzapine to 2.5 mg at bedtime only    4.    Continue paroxetine 30 mg daily    5.    Continue amantadine 100 mg twice daily    6.   Continue Lorazepam 0.5 mg twice daily       Continue all other medications as reviewed per electronic medical record today.     Safety plan reviewed. To the Emergency Department as needed or call after hours crisis line at [phone] or [phone]. [place] Crisis [place]. Text [place] to 038949 or Suicide LifeLine Chat: [place].org/chat/    To schedule individual or family therapy, call [facility] at [phone].    Scheduled an appointment with me on October 28 or sooner as needed. Call [facility] at [phone] with questions     Follow up with primary care provider as planned or for acute medical concerns.    Call the psychiatric nurse line with medication questions or concerns at [phone].    [employer] may be used to communicate with your provider, but this is not intended to be used for emergencies.     Crisis Resources:    National Suicide Prevention Lifeline: 601.271.8432 (TTY: 240.278.1557). Call anytime for help.  (www.suicidepreventionlifeline.org)  National [place] on Mental Illness (www.melvin.org): 176.225.2936 or 813-208-8617.   Mental Health Association (www.mentalhealth.org): 915.913.2153 or 003-961-1510.  [place] Crisis [place]: Text [place] to 433230  Suicide LifeLine Chat: suicideXenome.org/chat

## 2021-10-07 NOTE — NURSING NOTE
________________________________________  Medications Phoned  to Pharmacy [] yes [x]no  Name of Pharmacist:  List Medications, including dose, quantity and instructions    Medications ordered this visit were e-scribed.  Verified by order class [x] yes  [] no  Zyprexa  Medication changes or discontinuations were communicated to patient's pharmacy: [x] yes  [] no  Called [facility],  d/c'd Cogentin and updated of the Zyprexa dose decrease to 2.5 mg Q HS  Dictation completed at time of chart check: [x] yes  [] no    I have checked the documentation for today s encounters and the above information has been reviewed and completed.
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
## 2021-10-08 ENCOUNTER — PATIENT OUTREACH (OUTPATIENT)
Dept: CARE COORDINATION | Facility: CLINIC | Age: 41
End: 2021-10-08

## 2021-10-08 NOTE — PROGRESS NOTES
Clinic Care Coordination Contact  [facility]/Voicemail       Clinical Data: Care Coordinator Outreach  Outreach attempted x 1.  Left message on patient's voicemail with call back information and requested return call.  Plan:  Care Coordinator will try to reach patient again in a month.    [facility]   Candace Perez RN, Care Coordinator   [facility] [place], Women's Clinic, [facility], [place]  E-mail [email or web address]   [phone]

## 2021-10-10 ENCOUNTER — HEALTH MAINTENANCE LETTER (OUTPATIENT)
Age: 41
End: 2021-10-10

## 2021-10-12 ENCOUNTER — TRANSFERRED RECORDS (OUTPATIENT)
Dept: HEALTH INFORMATION MANAGEMENT | Facility: CLINIC | Age: 41
End: 2021-10-12

## 2021-10-18 ENCOUNTER — HOSPITAL ENCOUNTER (OUTPATIENT)
Dept: NUTRITION | Facility: CLINIC | Age: 41
Discharge: HOME | End: 2021-10-18
Attending: DIETITIAN, REGISTERED | Admitting: DIETITIAN, REGISTERED
Payer: COMMERCIAL

## 2021-10-18 VITALS — WEIGHT: 179.6 LBS | BODY MASS INDEX: 27.31 KG/M2

## 2021-10-18 PROCEDURE — 97803 MED NUTRITION INDIV SUBSEQ: CPT | Mod: GT,95 | Performed by: DIETITIAN, REGISTERED

## 2021-10-18 NOTE — PROGRESS NOTES
Video-Visit Details    Type of service:  Video Visit    Video Start Time (time video started): 2:00    Video End Time (time video stopped): 2:19    Originating Location (pt. Location): Other [facility]     Distant Location (provider location):  [facility] NUTRITION SERVICES     Mode of Communication:  Video Conference via [facility]    OUTPATIENT NUTRITION ASSESSMENT (VIDEO VISIT DUE TO COVID-19)   REASON FOR ASSESSMENT  Scot Bishop referred by Dr. Morales for MNT related to                              Traumatic brain injury with loss of consciousness, sequela (H) [S06.9X9S]  - Primary       G tube feedings (H) [Z93.1]                   Patient accompanied by Gerald, father; Audrey, step mother; staff members, José Miguel        ASSESSMENT   Nutrition History:  - Information obtained from father, step mom and staff worker.  Patient's step mom describes patient's eating habits as limited food choices with hamburgers and pizza but now eats all foods provided to him.  Patient's father states that when they feed him, patient will eat most of his meal.  Priyanka, staff worker, states patient eats 45-50% of meals.  Patient currently living at [facility] in [place].  Patient has 7 year old son.      Patient's father states patient's  lbs. Patient is on a regular diet with thin liquids.  Patient is a total feed.     Patient has regular formed bowel movements either daily or every other day.       7/5/2021 Received calorie count from group home.  Patient eating 100% of meals.  Patient receiving 2 meals per day as  states patient will vomit if given breakfast after TF administration.  Patient having difficulty drinking liquids as will chew on cups or straws.  Patient's father bought sippy cup for patient to try and seems to do well with it. Patient will drink 16 oz fluid (orally) water and milk.  Patient receiving 3 (60 mL) water flushes 8 times per day.   asking  if evening flushes can be changed due to prevent waking patient 3 times per night.        7/19 No calorie count provided by [facility].  Diet recall past 24 hours: scrambled egg and yogurt; tuna sandwich, banana and water; spaghetti with shrimp, orange and milk-drank 25%. Patient continues to drink with sippy cup.  Patient's father states can drink entire sippy cup (8-10 oz in 1 hour). Staff members don't feel patient will drink more than 8 oz per day from sippy cup. Patient does not eat full breakfast due to previous vomiting after the TF.  Per father, patient working with speech therapist, PT and OT weekly.      8/2/2021 Patient's step mother states that patient will drink entire sippy cup of 8 oz if someone helps him.  José Miguel [facility] director, states did not receive order to reduce TF so has continued to run at 70 mL/hr x 11 hours. Patient having regular daily stools.       8/18/2021 Received food log from [facility].  Patient eating 2-3 meals + 1-2 snacks (yogurt or applesauce).  Pancakes for breakfast.  Lunch -turkey sandwich or spaghetti with meat sauce Dinner: chicken vale.  Parents state patient can drink liquids when continually offered.  Father asking if patient can use different cup to increase fluid intake.       9/13/2021 Patient continues on current TF regimen of Isosource 1.5 @ 70 mL/hr x 11 hours.  Staff have been increasing protein intake in diet to help improve protein intake.  Note patient to participate in [employer] for OT/PT/ST.  Staff using syringe to provide fluid to remove food from teeth and oral cares.  Patient given water through syringe for fluids also.       10/18/2021 Patient's TF was reduced to 35 mL/hr x 11 hours.  Patient having daily-every other day bowel movement.  Patient will occasionally have constipation and then will be given protocol for constipation.  Patient will eat all foods given to him.  Patient having sippy cup for fluids.  José Miguel [facility] staff director  "states receiving 6-8 oz fluid at meals.  Patient will be given water flush through syring into mouth 30 mL per time. Continues to receive 180 mL every 2 hours during the day (7 AM-8 PM).      Diet Recall:  Breakfast: 3 waffles with 2 scrambled eggs + 4 oz milk (18 grams protein)  Lunch: chicken sandwich with 4 oz water (24 grams protein)   Dinner: tuna pasta salad with 1 garlic bread + 4 oz water (15 grams protein)   Snack: chips; sugar free pudding (4 grams protein)   Beverages: water     8 day average protein intake 52 grams protein      Exercise: Patient working with PT and OT.      NUTRITION FOCUSED PHYSICAL ASSESSMENT (NFPA) FOR DIAGNOSING MALNUTRITION  Yes         Observed:   No nutrition-related physical findings observed     Obtained from Chart/Interdisciplinary Team:  Non-healing wound near G tube      LABS  Labs reviewed     MEDICATIONS/SUPPLEMENTS  Medications/supplements reviewed-no MVI      ANTHROPOMETRICS   Height: 5'8\"  Weight: 179.6 lbs (10/18/2021)  BMI (kg/m2): 27.3 kg/m2  Weight Status:  Overweight   %IBW: 116%  Weight History: 2/19/2021 taken in MD office with patient in wheelchair. Patient with 17% weight gain in 7 months.  Wt Readings from Last 10 Encounters:   10/18/21 81.5 kg (179 lb 9.6 oz)   09/13/21 83.1 kg (183 lb 3.2 oz)   08/18/21 80.7 kg (178 lb)   08/02/21 80.4 kg (177 lb 3.2 oz)   07/19/21 83.7 kg (184 lb 9.6 oz)   07/05/21 81.8 kg (180 lb 6.4 oz)   02/19/21 108.4 kg (239 lb)   02/02/21 69.4 kg (153 lb)   02/02/21 69.4 kg (153 lb)   01/26/21 69.9 kg (154 lb 3.2 oz)      ASSESSED NUTRITION NEEDS  Estimated Energy Needs: 3633-5642 kcals/day (25-30 Kcal/Kg)  Justification:  (maintenance)  Estimated Protein Needs:  grams protein/day (1.2-1.5 g pro/Kg)  Justification:  (preservation of lean body mass)  Estimated Fluid Needs: 2016-5431 mL/day (30-35 mL/kg)      NUTRITION SUPPORT ENTERAL:  Type of Feeding Tube: G tube   Enteral Frequency:  Cyclic  Enteral Regimen: Isosource 1.5 "   Total Enteral Provisions:Isosource 1.5 at 35 mL/hr x 11 hours -7 PM - 8 AM) to provide 575 calories, 26 grams protein, 292 mL free fluid and 6 grams fiber  1440 water flush + 292 free water from TF + 540 mL oral fluids = 2272 mL (28 mL/kg ABW)  Adjust fluid flushes 180 mL water flush 8 times per day   180 mL water flush before and after tube feeding (8 PM and 7 AM)  180 mL water flush 9 AM, 11 AM, 1 PM, 3 PM, 5 PM, 7 PM       ASSESSED MALNUTRITION STATUS  % Weight Loss:  None noted  % Intake:  No decreased intake noted  Subcutaneous Fat Loss:  None observed  Loss of Muscle Mass:  None observed  Fluid Retention:  None noted     Malnutrition Diagnosis:  Patient does not meet two of the above criteria necessary for diagnosing malnutrition     EVALUATION/PROGRESS TOWARDS GOALS  Previous nutrition goals:  Weekly weight-improving   Calorie count for 7 days (portion provided and amount eaten)-improving (did not provide quantity protein)   Isosource 1.5 at 70 mL/hr x 11 hours (8 PM-7AM)-met  180 mL water flush at 8 PM and 7 AM (before and after TF administration)-met  180 mL water flush 9 AM, 11 PM, 1 PM, 3 PM, 5 PM, 7 PM-met  Add protein source at breakfast-improving   Add protein bar daily as snack-not met   Measure protein source at lunch and dinner-improving      Previous nutrition diagnosis:  Excessive nutrient intake related to G-tube feeding and oral intake as evidenced by no reduction in TF since started po intake and 15% weight gain -no change, modified below      Current nutrition diagnosis: Excessive nutrient intake related to G-tube feeding and oral intake as evidenced by 15% weight gain in 8 months      INTERVENTIONS   Nutrition Prescription   Recommend 7 day calorie count to assess oral intake to wean TF; add protein source at breakfast; protein bar as snack      IMPLEMENTATION   Assessed learning needs and learning preference  Teaching Method(s) used: Explanation  Vitamin and Mineral Supplements: suggest  complete multivitamin and mineral once weaned off TF  Diet Education:  Provided education on calorie intake   Nutrition Education (Content):              a)  Discussed progress towards goals.  Reviewed food log.  It appears patient consumes 45 grams protein orally and drinking 21 oz fluids per day.  Patient's TF running at 35 mL/hr x 11 hours.  Appreciate consistent weight on patient.  Explained to parents and staff that fluid needs are the biggest challenge to remove TF as patient cannot currently meet fluid needs orally. Patient requires 70-80 fluids per day and patient currently consuming 21 oz per day on average.  Note orders to work with speech therapist.  Speech therapist will need to evaluate safety for fluids using cup and syringe.  Patient's family and staff member verbalizes understanding of next steps.     GOALS  Weekly weight  Calorie count for 7 days (portion provided and amount eaten)  Isosource 1.5 at 35 mL/hr x 11 hours (8 PM-7AM)  180 mL water flush at 8 PM and 7 AM (before and after TF administration)  180 mL water flush 9 AM, 11 PM, 1 PM, 3 PM, 5 PM, 7 PM  Add protein source at breakfast  Measure protein source at lunch and dinner      FOLLOW UP/MONITORING   Progress towards goals will be monitored and evaluated per protocol and Practice Guidelines  Group home staff to send weight and food logs prior to next appointment  Patient to follow up in 5 weeks  RD name and number provided      Time Spent with Patient  19 minutes     Lauryn Licona, RD, LD  [facility] Outpatient Dietitian  [phone] (office phone)

## 2021-10-20 NOTE — TELEPHONE ENCOUNTER
"Refill request r'cd from [facility] via fax for Paroxetine denied due to Refill too Soon. Faxed \"not authorized\" back to pharmacy.    Diana Jason RN  October 20, 2021  9:53 AM    "

## 2021-11-02 DIAGNOSIS — F34.81 DISRUPTIVE MOOD DYSREGULATION DISORDER (H): ICD-10-CM

## 2021-11-02 DIAGNOSIS — G25.9 EXTRAPYRAMIDAL RIGIDITY: ICD-10-CM

## 2021-11-02 NOTE — TELEPHONE ENCOUNTER
Date of Last Office Visit: 10/07/2021  Date of Next Office Visit: none-will route to scheduling  No shows since last visit: 0  Cancellations since last visit: 1 on 10/28/2021 (clinician)    Medication requested: amantadine (SYMMETREL) 50 MG/5ML  Date last ordered: 09/24/2021 Qty: 600mL Refills: 0     Medication requested: Valproate Sodium (VALPROIC ACID) 250 MG/5ML Date last ordered: 09/01/2021 Qty: 600mL Refills: 1    Lapse in medication adherence greater than 5 days?: no  If yes, call patient and gather details: n/a  Medication refill request verified as identical to current order?: yes  Result of Last DAM, VPA, Li+ Level, CBC, or Carbamazepine Level (at or since last visit): N/A    Last visit treatment plan:     1.    Continue valproic acid  500 mg (10 ml) twice daily     2.    Discontinue benztropine      3.    Decrease Olanzapine to 2.5 mg at bedtime only    4.    Continue paroxetine 30 mg daily    5.    Continue amantadine 100 mg twice daily    6.   Continue Lorazepam 0.5 mg twice daily      []Medication refilled per  Medication Refill in Ambulatory Care  policy.  [x]Medication unable to be refilled by RN due to criteria not met as indicated below:    []Eligibility - not seen in the last year   [x]Supervision - no future appointment   []Compliance - no shows, cancellations or lapse in therapy   []Verification - order discrepancy   []Controlled medication   [x]Medication not included in policy   []90-day supply request   []Other

## 2021-11-02 NOTE — TELEPHONE ENCOUNTER
Reason for Call:  Other appointment    Detailed comments: the soonest appt with Elsa is 12/23/21 and the Guardian advised this is too far out and unacceptable and would like a sooner appt.    Phone Number Patient can be reached at: Home number on file [phone] (home)    Best Time: asap    Can we leave a detailed message on this number? YES    Call taken on 11/2/2021 at 1:55 PM by Nadia Marquez

## 2021-11-03 RX ORDER — VALPROIC ACID 250 MG/5ML
500 SOLUTION ORAL 2 TIMES DAILY
Qty: 600 ML | Refills: 1 | Status: SHIPPED | OUTPATIENT
Start: 2021-11-03 | End: 2021-12-31

## 2021-11-08 ENCOUNTER — PATIENT OUTREACH (OUTPATIENT)
Dept: NURSING | Facility: CLINIC | Age: 41
End: 2021-11-08
Payer: COMMERCIAL

## 2021-11-08 NOTE — LETTER
[address] [place] [address]    [facility]  Patient Centered Plan of Care  Updated 11/8/2021  About Me:        Patient Name:  Scot Bishop    YOB: 1980  Age:         41 year old   Heather MRN:    [id number] Telephone Information:  Home Phone [phone]   Mobile [phone]       Address:  [address] [address] Email address:  [email or web address]      Emergency Contact(s)    Name Relationship Lgl Grd Work Phone Home Phone Mobile Phone   1. AMOS BISHOP* Father Yes  [phone]   2. SUZI BISHOP* Mother No  [phone]   3. [employer] *    [phone]    4. LAMINE, PROGRAM *     [phone]   5. MITZYLOS Stepparent No  [phone]           Primary language:  English     needed? No   [facility]:  [phone] op. 1  Other communication barriers:Cognitive impairment    Preferred Method of Communication:     Current living arrangement: Other (group home)    Mobility Status/ Medical Equipment: Dependent/Assisted by Another        Health Maintenance  Health Maintenance Reviewed: Due/Overdue   Health Maintenance Due   Topic Date Due     PREVENTIVE CARE VISIT  Never done     COVID-19 Vaccine (3 - Booster for Moderna series) 07/25/2021     INFLUENZA VACCINE (1) Never done           My Access Plan  Medical Emergency 911   Primary Clinic Line   - [phone]   24 Hour Appointment Line [phone] or  [phone]) (toll-free)   24 Hour Nurse Line [phone] (toll-free)   Preferred Urgent Care [facility], [phone]     Preferred Hospital [facility]  [phone]     Preferred Pharmacy [facility] [place] - [address] [place] [address]     Behavioral Health Crisis Line The National Suicide Prevention Lifeline at 1-646.191.2310 or 911             My Care Team Members  Patient Care Team       Relationship Specialty  Notifications Start End    Cachorro Morales MD PCP - General Internal Medicine  1/19/21     Phone: [phone] Fax: [phone]         [address] [place] [address]    Scot Matamoros MD MD Cardiology  6/12/20     Phone: [phone] Fax: [phone]         [address] [facility] [address]    Cachorro Morales MD Assigned PCP   12/24/20     Phone: [phone] Fax: [phone]         [address] [place] [address]    EVELINA Myles CM    2/5/21     [facility]    Phone: [phone]         José Miguel group home     2/5/21     [facility] group home    Phone: [phone] Fax: [phone]        Melanie financial worker Financial Resource Worker   2/5/21     [facility]    Phone: [phone]         Elsa Rahman NP Assigned Behavioral Health Provider   2/14/21     Phone: [phone] Fax: [phone]         [address] [place] [address]    Bibi Marcus MD Assigned Heart and Vascular Provider   3/17/21     Phone: [phone] Pager: [phone] Fax: [phone]        [address] [facility] [address]    Candace Perez RN Lead Care Coordinator  Admissions 8/19/21             My Care Plans  Self Management and Treatment Plan  Goals and (Comments)  Goals        General     Functional (pt-stated)      Notes - Note edited  11/8/2021  4:12 PM by Candace Perez RN     Goal Statement: Patient will have a plan for future rehabilitation services after home care PT ends which would include outpatient Physical Therapy  Date Goal set: 8/20/2021  Barriers: Deconditioned   Strengths:Supportive parents   Date to Achieve By: 12/20/2021  Patient expressed understanding of goal: Father agrees with the plan   Action steps to achieve this goal:  1. I will keep future Neurology and [facility] Rehabilitation assessment 8/30/2021 resheduled to 10/1 at 1 pm appointments   3. Care coordinator will collaborate with  physical therapist ,parents ,[facility] /Group home staff for problem solving and updates              Action Plans on File:                       Advance Care Plans/Directives Type:   No data recorded    My Medical and Care Information  Problem List   Patient Active Problem List   Diagnosis     Cardiac arrest (H)     Traumatic brain injury (H)     Thrush, oral     Yeast infection     Nicotine dependence     Nonverbal     Gastrojejunostomy tube status (H)     G tube feedings (H)     Advance care planning     Dysphagia     Cognitive disorder     Backache     Back injury, sequela     Agitation     Aggression     Acute respiratory failure with hypoxia (H)     Thrombocytopenia, unspecified (H)      Current Medications and Allergies:  No Known Allergies  Current Outpatient Medications   Medication     acetaminophen (TYLENOL) 325 MG tablet     amantadine (SYMMETREL) 50 MG/5ML syrup     aspirin (ASA) 81 MG chewable tablet     atorvastatin (LIPITOR) 40 MG tablet     bacitracin 500 UNIT/GM OINT     docusate sodium (COLACE) 100 MG capsule     Emollient (AQUAPHOR ADVANCED THERAPY) OINT     LORazepam (ATIVAN) 0.5 MG tablet     metoprolol tartrate (LOPRESSOR) 25 MG tablet     nystatin (MYCOSTATIN) 868415 UNIT/GM external cream     OLANZapine (ZYPREXA) 2.5 MG tablet     PARoxetine (PAXIL) 30 MG tablet     ticagrelor (BRILINTA) 90 MG tablet     Valproate Sodium (VALPROIC ACID) 250 MG/5ML     No current facility-administered medications for this visit.         Care Coordination Start Date: 8/20/2021   Frequency of Care Coordination: monthly     Form Last Updated: 11/08/2021     [facility]   Candace Perez RN, Care Coordinator   [facility] Karla Coke, [facility], [facility], [place]  E-mail [email or web address]   [phone]

## 2021-11-08 NOTE — PROGRESS NOTES
Clinic Care Coordination Contact    Follow Up Progress Note      Assessment:   -[facility] RN/PT services have been completed as the patient has plateau  -Patient is eating regular food and using G Tube for fluids       Care Gaps:    Health Maintenance Due   Topic Date Due     PREVENTIVE CARE VISIT  Never done     COVID-19 Vaccine (3 - Booster for Moderna series) 07/25/2021     INFLUENZA VACCINE (1) Never done       Postponed to next RNCC outreach     Goals addressed this encounter:   Goals Addressed                    This Visit's Progress       Functional (pt-stated)   50%      Goal Statement: Patient will have a plan for future rehabilitation services after home care PT ends which would include outpatient Physical Therapy  Date Goal set: 8/20/2021  Barriers: Deconditioned   Strengths:Supportive parents   Date to Achieve By: 12/20/2021  Patient expressed understanding of goal: Father agrees with the plan   Action steps to achieve this goal:  1. I will keep future Neurology and [facility] Rehabilitation assessment 8/30/2021 resheduled to 10/1 at 1 pm appointments   3. Care coordinator will collaborate with physical therapist ,parents ,[facility] /Group [place] staff for problem solving and updates             Intervention/Education provided during outreach:   -RNCC called and spoke with patient; introduced self, discussed role of Care Coordination, and explained reason for call     Outreach Frequency: monthly    Plan:   -Patient has scheduled outpatient Physical Therapy at [facility]  -Parents will request the removal of the G Tube due to patient eating at the next PCP appointment  -Patient/Parents will use the clinic as a resource and understands they can contact the [place] clinic with 24/7 after hours services available  -Care Coordinator will remain available as needed  -RNCC will follow up in one month follow up appointments/recommendations and goal progression    Care Coordinator will  follow up in one month    [facility]   Candace Perez RN, Care Coordinator   [facility] Karla Finney, [facility], [facility], [place]  E-mail [email or web address]   [phone]

## 2021-11-09 DIAGNOSIS — I46.9 CARDIAC ARREST (H): ICD-10-CM

## 2021-11-09 DIAGNOSIS — E78.5 HLD (HYPERLIPIDEMIA): ICD-10-CM

## 2021-11-10 ENCOUNTER — HOSPITAL ENCOUNTER (OUTPATIENT)
Dept: RADIOLOGY | Facility: HOSPITAL | Age: 41
Discharge: HOME | End: 2021-11-10
Attending: FAMILY MEDICINE | Admitting: FAMILY MEDICINE
Payer: COMMERCIAL

## 2021-11-10 DIAGNOSIS — Z87.820 HX OF TRAUMATIC BRAIN INJURY: ICD-10-CM

## 2021-11-10 PROCEDURE — 49450 REPLACE G/C TUBE PERC: CPT

## 2021-11-10 RX ORDER — ATORVASTATIN CALCIUM 40 MG/1
TABLET, FILM COATED ORAL
Qty: 28 TABLET | Refills: 2 | OUTPATIENT
Start: 2021-11-10

## 2021-11-10 RX ORDER — ASPIRIN 81 MG
TABLET,CHEWABLE ORAL
Qty: 28 TABLET | Refills: 2 | OUTPATIENT
Start: 2021-11-10

## 2021-11-10 RX ADMIN — DIATRIZOATE MEGLUMINE AND DIATRIZOATE SODIUM 10 ML: 660; 100 SOLUTION ORAL; RECTAL at 11:20

## 2021-11-10 NOTE — TELEPHONE ENCOUNTER
"Refill request r'cd from [facility] via fax for Lorazepam denied due to Refill too soon. Faxed \"not authorized\" back to pharmacy.    Diana Jason RN November 10, 2021 1:45 PM      "

## 2021-11-15 ASSESSMENT — ENCOUNTER SYMPTOMS
HEMATOCHEZIA: 0
PALPITATIONS: 0
PARESTHESIAS: 0
SORE THROAT: 0
HEADACHES: 0
DIZZINESS: 0
CHILLS: 0
EYE PAIN: 0
FREQUENCY: 0
MYALGIAS: 0
CONSTIPATION: 0
HEMATURIA: 0
HEARTBURN: 0
COUGH: 0
NERVOUS/ANXIOUS: 0
NAUSEA: 0
FEVER: 0
DIARRHEA: 0
SHORTNESS OF BREATH: 0
DYSURIA: 0
JOINT SWELLING: 0
ABDOMINAL PAIN: 0
ARTHRALGIAS: 0
WEAKNESS: 0

## 2021-11-15 ASSESSMENT — PATIENT HEALTH QUESTIONNAIRE - PHQ9
10. IF YOU CHECKED OFF ANY PROBLEMS, HOW DIFFICULT HAVE THESE PROBLEMS MADE IT FOR YOU TO DO YOUR WORK, TAKE CARE OF THINGS AT HOME, OR GET ALONG WITH OTHER PEOPLE: NOT DIFFICULT AT ALL
SUM OF ALL RESPONSES TO PHQ QUESTIONS 1-9: 4
SUM OF ALL RESPONSES TO PHQ QUESTIONS 1-9: 4

## 2021-11-15 NOTE — PROGRESS NOTES
SUBJECTIVE:   Scot Bishop is a 41 year old male who presents for Preventive Visit.  Patient has been advised of split billing requirements and indicates understanding: Yes   Are you in the first 12 months of your Medicare coverage?  No    Healthy Habits:     Getting at least 3 servings of Calcium per day:  Yes    Bi-annual eye exam:  NO    Dental care twice a year:  NO    Sleep apnea or symptoms of sleep apnea:  None    Diet:  Regular (no restrictions)    Frequency of exercise:  None    Taking medications regularly:  Yes    Medication side effects:  Not applicable    PHQ-2 Total Score: 3    Additional concerns today:  Yes    Do you feel safe in your environment? Yes    Have you ever done Advance Care Planning? (For example, a Health Directive, POLST, or a discussion with a medical provider or your loved ones about your wishes): Yes, advance care planning is on file.     Fall risk:   Note TBI    Cognitive Screening:  noted TBI    Do you have sleep apnea, excessive snoring or daytime drowsiness?: no    Reviewed and updated as needed this visit by clinical staff                Reviewed and updated as needed this visit by Provider               Social History     Tobacco Use     Smoking status: Former Smoker     Smokeless tobacco: Never Used   Substance Use Topics     Alcohol use: Not Currently         Alcohol Use 11/15/2021   Prescreen: >3 drinks/day or >7 drinks/week? Not Applicable       Current providers sharing in care for this patient include:   Patient Care Team:  Cachorro Morales MD as PCP - General (Internal Medicine)  Scot Matamoros MD as MD (Cardiology)  Cachorro Morales MD as Assigned PCP  EVELINA Myles CM as   [facility]  as   Melanie, financial worker as Financial Resource Worker  Elsa Rahman NP as Assigned Behavioral Health Provider  Bibi Marcus MD as Assigned Heart and Vascular Provider  Candace Perez RN as Lead Care  "Coordinator    The following health maintenance items are reviewed in Epic and correct as of today:  Health Maintenance Due   Topic Date Due     PREVENTIVE CARE VISIT  Never done     ANNUAL REVIEW OF HM ORDERS  Never done     COVID-19 Vaccine (3 - Booster for Moderna series) 07/25/2021     INFLUENZA VACCINE (1) Never done     Lab work is in process  Immunization History   Administered Date(s) Administered     COVID-19,PF,Moderna 12/28/2020, 01/25/2021     DTAP (<7y) 1979, 1979, 01/09/1980, 02/02/1981, 07/02/1984     MMR 11/12/1980     Pneumococcal 23 valent 08/08/2020, 10/13/2020     Polio, Unspecified  1979, 1979, 02/02/1981, 07/02/1984     TD (ADULT, 7+) 08/17/2005     Tdap (Adult) Unspecified Formulation 08/21/2014     Other concerns:  1. Rash on abd   2. Discuss removal of feeding tube      Review of Systems   Constitutional: Negative for chills and fever.   HENT: Negative for congestion, ear pain, hearing loss and sore throat.    Eyes: Negative for pain and visual disturbance.   Respiratory: Negative for cough and shortness of breath.    Cardiovascular: Negative for chest pain, palpitations and peripheral edema.   Gastrointestinal: Negative for abdominal pain, constipation, diarrhea, heartburn, hematochezia and nausea.   Genitourinary: Negative for dysuria, frequency, genital sores, hematuria, impotence, penile discharge and urgency.   Musculoskeletal: Negative for arthralgias, joint swelling and myalgias.   Skin: Positive for rash.   Neurological: Negative for dizziness, weakness, headaches and paresthesias.   Psychiatric/Behavioral: Positive for mood changes. The patient is not nervous/anxious.        OBJECTIVE:   BP 96/60   Pulse 83   Temp 99  F (37.2  C) (Temporal)   Resp 16   SpO2 100%  Estimated body mass index is 27.31 kg/m  as calculated from the following:    Height as of 2/19/21: 1.727 m (5' 8\").    Weight as of 10/18/21: 81.5 kg (179 lb 9.6 oz).     Physical " Exam:    GENERAL: alert with eyes open and no distress, limited purposeful movement  EYES: Eyes grossly normal to inspection, PERRL and conjunctivae and sclerae normal  HENT: ear canals and TM's normal, nose and mouth without ulcers or lesions  NECK: no adenopathy, no asymmetry, masses, or scars and thyroid normal to palpation  RESP: lungs clear to auscultation - no rales, rhonchi or wheezes  CV: regular rate and rhythm, normal S1 S2, no S3 or S4, no click or rub  NEURO: noted TBI and appears as not purposeful in movement  PSYCH: mentation appears normal, affect baseline    G-Tube was replaced with a new G-tube on November 10, 2021 and the tube was confirmed to be in satisfactory position and stomach per the radiology report    ASSESSMENT / PLAN:   (Z00.01) Encounter for routine adult medical exam with abnormal findings  (primary encounter diagnosis)  Comment: Would recommend updated vaccinations as requested with flu and Covid.  Plan: CBC with platelets, Comprehensive metabolic         panel, Lipid Profile        We will obtain labs today even though patient is not fasting after discussion with parent    (S06.9X9S) Traumatic brain injury with loss of consciousness, sequela (H)  Comment: Suspect patient has reached baseline.  Noted EEG results demonstrating diffuse slowing consistent with likely anoxic changes but no epileptiform activity.  Following up with neurology as directed.  Discussed with parents issues of such.  Suggest may benefit from ongoing physical therapy for strengthening and conditioning.  They will contact their care coordinator to find out if [facility] health for PT is still considered a in network referral option.  Plan: Speech Therapy Referral            (Z93.4) Gastrojejunostomy tube status (H)  Comment: Discussed issues with parents in regards to feeding tube.  I have concerns that the patient may not be able to subsequently maintain his oral hydration state.  The parents also suggest such.  He  "apparently has been eating well from an oral standpoint but I have also recommended speech assessment to assure swallowing parameters and guidelines in regards to feeding if we were to consider removal of feeding tube.  Plan: Patient will be seeing nutrition specialist on Monday at which time further recommendations may be forthcoming in regards to whether or not the patient may be able to tolerate adequate hydration without feeding tube.    (Z13.6) CARDIOVASCULAR SCREENING; LDL GOAL LESS THAN 100  Comment: Labs ordered.  Plan:     (Z23) High priority for 2019-nCoV vaccine  Comment: Vaccination updated today  Plan: COVID-19,PF,MODERNA (18+ Yrs BOOSTER .25mL)            (Z23) Need for prophylactic vaccination and inoculation against influenza  Comment: Recommend updated vaccination today  Plan:     Patient has been advised of split billing requirements and indicates understanding: Yes  COUNSELING:  Reviewed preventive health counseling, as reflected in patient instructions    Estimated body mass index is 27.31 kg/m  as calculated from the following:    Height as of 2/19/21: 1.727 m (5' 8\").    Weight as of 10/18/21: 81.5 kg (179 lb 9.6 oz).        He reports that he has quit smoking. He has never used smokeless tobacco.      Appropriate preventive services were discussed with this patient, including applicable screening as appropriate for cardiovascular disease, diabetes, osteopenia/osteoporosis, and glaucoma.  As appropriate for age/gender, discussed screening for colorectal cancer, prostate cancer, breast cancer, and cervical cancer. Checklist reviewing preventive services available has been given to the patient.    Reviewed patients plan of care and provided an AVS. The Basic Care Plan (routine screening as documented in Health Maintenance) for Scot meets the Care Plan requirement. This Care Plan has been established and reviewed with the mother and father.    Counseling Resources:  ATP IV Guidelines  Pooled " Cohorts Equation Calculator  Breast Cancer Risk Calculator  Breast Cancer: Medication to Reduce Risk  FRAX Risk Assessment  ICSI Preventive Guidelines  Dietary Guidelines for Americans, 2010  USDA's MyPlate  ASA Prophylaxis  Lung CA Screening    Cachorro Morales MD  [facility]    Identified Health Risks:  Answers for HPI/ROS submitted by the patient on 11/15/2021  If you checked off any problems, how difficult have these problems made it for you to do your work, take care of things at home, or get along with other people?: Not difficult at all  PHQ9 TOTAL SCORE: 4

## 2021-11-16 ENCOUNTER — OFFICE VISIT (OUTPATIENT)
Dept: INTERNAL MEDICINE | Facility: CLINIC | Age: 41
End: 2021-11-16
Payer: COMMERCIAL

## 2021-11-16 VITALS
OXYGEN SATURATION: 100 % | HEART RATE: 83 BPM | RESPIRATION RATE: 16 BRPM | DIASTOLIC BLOOD PRESSURE: 60 MMHG | TEMPERATURE: 99 F | SYSTOLIC BLOOD PRESSURE: 96 MMHG

## 2021-11-16 DIAGNOSIS — Z23 HIGH PRIORITY FOR 2019-NCOV VACCINE: ICD-10-CM

## 2021-11-16 DIAGNOSIS — Z13.6 CARDIOVASCULAR SCREENING; LDL GOAL LESS THAN 100: ICD-10-CM

## 2021-11-16 DIAGNOSIS — S06.9X9S TRAUMATIC BRAIN INJURY WITH LOSS OF CONSCIOUSNESS, SEQUELA (H): ICD-10-CM

## 2021-11-16 DIAGNOSIS — Z23 NEED FOR PROPHYLACTIC VACCINATION AND INOCULATION AGAINST INFLUENZA: ICD-10-CM

## 2021-11-16 DIAGNOSIS — Z93.4 GASTROJEJUNOSTOMY TUBE STATUS (H): ICD-10-CM

## 2021-11-16 DIAGNOSIS — Z00.01 ENCOUNTER FOR ROUTINE ADULT MEDICAL EXAM WITH ABNORMAL FINDINGS: Primary | ICD-10-CM

## 2021-11-16 LAB
ALBUMIN SERPL-MCNC: 3.6 G/DL (ref 3.4–5)
ALP SERPL-CCNC: 112 U/L (ref 40–150)
ALT SERPL W P-5'-P-CCNC: 25 U/L (ref 0–70)
ANION GAP SERPL CALCULATED.3IONS-SCNC: 6 MMOL/L (ref 3–14)
AST SERPL W P-5'-P-CCNC: 7 U/L (ref 0–45)
BILIRUB SERPL-MCNC: 0.5 MG/DL (ref 0.2–1.3)
BUN SERPL-MCNC: 15 MG/DL (ref 7–30)
CALCIUM SERPL-MCNC: 9 MG/DL (ref 8.5–10.1)
CHLORIDE BLD-SCNC: 109 MMOL/L (ref 94–109)
CHOLEST SERPL-MCNC: 100 MG/DL
CO2 SERPL-SCNC: 25 MMOL/L (ref 20–32)
CREAT SERPL-MCNC: 0.8 MG/DL (ref 0.66–1.25)
ERYTHROCYTE [DISTWIDTH] IN BLOOD BY AUTOMATED COUNT: 12.8 % (ref 10–15)
FASTING STATUS PATIENT QL REPORTED: NO
GFR SERPL CREATININE-BSD FRML MDRD: >90 ML/MIN/1.73M2
GLUCOSE BLD-MCNC: 108 MG/DL (ref 70–99)
HCT VFR BLD AUTO: 42.6 % (ref 40–53)
HDLC SERPL-MCNC: 39 MG/DL
HGB BLD-MCNC: 13.6 G/DL (ref 13.3–17.7)
LDLC SERPL CALC-MCNC: 27 MG/DL
MCH RBC QN AUTO: 30.6 PG (ref 26.5–33)
MCHC RBC AUTO-ENTMCNC: 31.9 G/DL (ref 31.5–36.5)
MCV RBC AUTO: 96 FL (ref 78–100)
NONHDLC SERPL-MCNC: 61 MG/DL
PLATELET # BLD AUTO: 198 10E3/UL (ref 150–450)
POTASSIUM BLD-SCNC: 4.3 MMOL/L (ref 3.4–5.3)
PROT SERPL-MCNC: 8.1 G/DL (ref 6.8–8.8)
RBC # BLD AUTO: 4.45 10E6/UL (ref 4.4–5.9)
SODIUM SERPL-SCNC: 140 MMOL/L (ref 133–144)
TRIGL SERPL-MCNC: 170 MG/DL
WBC # BLD AUTO: 8.5 10E3/UL (ref 4–11)

## 2021-11-16 PROCEDURE — 85027 COMPLETE CBC AUTOMATED: CPT | Performed by: INTERNAL MEDICINE

## 2021-11-16 PROCEDURE — 80053 COMPREHEN METABOLIC PANEL: CPT | Performed by: INTERNAL MEDICINE

## 2021-11-16 PROCEDURE — 99396 PREV VISIT EST AGE 40-64: CPT | Mod: 25 | Performed by: INTERNAL MEDICINE

## 2021-11-16 PROCEDURE — 91306 COVID-19,PF,MODERNA (18+ YRS BOOSTER .25ML): CPT | Performed by: INTERNAL MEDICINE

## 2021-11-16 PROCEDURE — 36415 COLL VENOUS BLD VENIPUNCTURE: CPT | Performed by: INTERNAL MEDICINE

## 2021-11-16 PROCEDURE — 96127 BRIEF EMOTIONAL/BEHAV ASSMT: CPT | Performed by: INTERNAL MEDICINE

## 2021-11-16 PROCEDURE — 0064A COVID-19,PF,MODERNA (18+ YRS BOOSTER .25ML): CPT | Performed by: INTERNAL MEDICINE

## 2021-11-16 PROCEDURE — 90471 IMMUNIZATION ADMIN: CPT | Performed by: INTERNAL MEDICINE

## 2021-11-16 PROCEDURE — 80061 LIPID PANEL: CPT | Performed by: INTERNAL MEDICINE

## 2021-11-16 PROCEDURE — 90686 IIV4 VACC NO PRSV 0.5 ML IM: CPT | Performed by: INTERNAL MEDICINE

## 2021-11-16 ASSESSMENT — PATIENT HEALTH QUESTIONNAIRE - PHQ9: SUM OF ALL RESPONSES TO PHQ QUESTIONS 1-9: 4

## 2021-11-22 ENCOUNTER — HOSPITAL ENCOUNTER (OUTPATIENT)
Dept: NUTRITION | Facility: CLINIC | Age: 41
Discharge: HOME | End: 2021-11-22
Attending: DIETITIAN, REGISTERED | Admitting: DIETITIAN, REGISTERED
Payer: COMMERCIAL

## 2021-11-22 ENCOUNTER — TELEPHONE (OUTPATIENT)
Dept: PSYCHIATRY | Facility: CLINIC | Age: 41
End: 2021-11-22
Payer: COMMERCIAL

## 2021-11-22 DIAGNOSIS — F34.81 DISRUPTIVE MOOD DYSREGULATION DISORDER (H): ICD-10-CM

## 2021-11-22 PROCEDURE — 97803 MED NUTRITION INDIV SUBSEQ: CPT | Mod: GT,95 | Performed by: DIETITIAN, REGISTERED

## 2021-11-22 RX ORDER — LORAZEPAM 0.5 MG/1
0.5 TABLET ORAL
Qty: 60 TABLET | Refills: 1 | Status: SHIPPED | OUTPATIENT
Start: 2021-11-22 | End: 2021-12-31

## 2021-11-22 NOTE — TELEPHONE ENCOUNTER
Date of Last Office Visit: 10/7/21  Date of Next Office Visit: 12/31/21  No shows since last visit: none  Cancellations since last visit: none    Medication requested: lorazepam 0.5mg Date last ordered: 9/28/21 Qty: 60 Refills: 1     Review of [place] ?: yes  Medication last filled date: 10/19/21 Qty filled: 60  Other controlled substance on [place] ?: no    Lapse in medication adherence greater than 5 days?: no  If yes, call patient and gather details:    Medication refill request verified as identical to current order?: yes  Result of Last DAM, VPA, Li+ Level, CBC, or Carbamazepine Level (at or since last visit): N/A    Last visit treatment plan:   Treatment Plan:       1.    Continue valproic acid  500 mg (10 ml) twice daily     2.    Discontinue benztropine      3.    Decrease Olanzapine to 2.5 mg at bedtime only    4.    Continue paroxetine 30 mg daily    5.    Continue amantadine 100 mg twice daily    6.   Continue Lorazepam 0.5 mg twice daily       Continue all other medications as reviewed per electronic medical record today.     Safety plan reviewed. To the Emergency Department as needed or call after hours crisis line at [phone] or [phone]. [place] Crisis Text Line. Text [place] to 278303 or Suicide LifeLine Chat: suicidepreventionlifeline.org/chat/    To schedule individual or family therapy, call [facility] at [phone].    Scheduled an appointment with me on October 28 or sooner as needed. Call [facility] at [phone] with questions     Follow up with primary care provider as planned or for acute medical concerns.    Call the psychiatric nurse line with medication questions or concerns at [phone].    MyChart may be used to communicate with your provider, but this is not intended to be used for emergencies.      []Medication refilled per  Medication Refill in Ambulatory Care  policy.  [x]Medication unable to be refilled by RN due to criteria not met as  indicated below:    []Eligibility - not seen in the last year   []Supervision - no future appointment   []Compliance - no shows, cancellations or lapse in therapy   []Verification - order discrepancy   [x]Controlled medication   []Medication not included in policy   []90-day supply request   []Other

## 2021-11-22 NOTE — PROGRESS NOTES
Video-Visit Details    Type of service:  Video Visit    Video Start Time (time video started): 1:55    Video End Time (time video stopped): 2:15    Originating Location (pt. Location): Other custodial    Distant Location (provider location):  [facility] NUTRITION SERVICES     Mode of Communication:  Video Conference via [facility]    OUTPATIENT NUTRITION ASSESSMENT (VIDEO VISIT DUE TO COVID-19)   REASON FOR ASSESSMENT  Scot Bishop referred by Dr. Morales for MNT related to   Traumatic brain injury with loss of consciousness, sequela (H) [S06.9X9S]  - Primary         G tube feedings (H) [Z93.1]                    Patient accompanied by Gerald, father; staff members, [facility]        ASSESSMENT   Nutrition History:  - Information obtained from father, step mom and staff worker.  Patient's step mom describes patient's eating habits as limited food choices with hamburgers and pizza but now eats all foods provided to him.  Patient's father states that when they feed him, patient will eat most of his meal.  Priyanka, staff worker, states patient eats 45-50% of meals.  Patient currently living at [facility] in [place].  Patient has 7 year old son.      Patient's father states patient's  lbs. Patient is on a regular diet with thin liquids.  Patient is a total feed.     Patient has regular formed bowel movements either daily or every other day.       7/5/2021 Received calorie count from group [place].  Patient eating 100% of meals.  Patient receiving 2 meals per day as  states patient will vomit if given breakfast after TF administration.  Patient having difficulty drinking liquids as will chew on cups or straws.  Patient's father bought sippy cup for patient to try and seems to do well with it. Patient will drink 16 oz fluid (orally) water and milk.  Patient receiving 3 (60 mL) water flushes 8 times per day.   asking if evening flushes can be changed due to  prevent waking patient 3 times per night.        7/19 No calorie count provided by [facility].  Diet recall past 24 hours: scrambled egg and yogurt; tuna sandwich, banana and water; spaghetti with shrimp, orange and milk-drank 25%. Patient continues to drink with sippy cup.  Patient's father states can drink entire sippy cup (8-10 oz in 1 hour). Staff members don't feel patient will drink more than 8 oz per day from sippy cup. Patient does not eat full breakfast due to previous vomiting after the TF.  Per father, patient working with speech therapist, PT and OT weekly.      8/2/2021 Patient's step mother states that patient will drink entire sippy cup of 8 oz if someone helps him.  [facility] states did not receive order to reduce TF so has continued to run at 70 mL/hr x 11 hours. Patient having regular daily stools.       8/18/2021 Received food log from [facility].  Patient eating 2-3 meals + 1-2 snacks (yogurt or applesauce).  Pancakes for breakfast.  Lunch -turkey sandwich or spaghetti with meat sauce Dinner: chicken vale.  Parents state patient can drink liquids when continually offered.  Father asking if patient can use different cup to increase fluid intake.       9/13/2021 Patient continues on current TF regimen of Isosource 1.5 @ 70 mL/hr x 11 hours.  Staff have been increasing protein intake in diet to help improve protein intake.  Note patient to participate in [facility] for OT/PT/ST.  Staff using syringe to provide fluid to remove food from teeth and oral cares.  Patient given water through syringe for fluids also.        10/18/2021 Patient's TF was reduced to 35 mL/hr x 11 hours.  Patient having daily-every other day bowel movement.  Patient will occasionally have constipation and then will be given protocol for constipation.  Patient will eat all foods given to him.  Patient having sippy cup for fluids.  [facility] states receiving 6-8 oz fluid at meals.   "Patient will be given water flush through syring into mouth 30 mL per time. Continues to receive 180 mL every 2 hours during the day (7 AM-8 PM).      11/22/2021 Patient will have swallow evaluation in December.  Patient continues with same TF regimen.  Group home director states water flush used to swish mouth not as way to provide fluid needs.     Diet Recall:  Breakfast: 3 waffles with 2 scrambled eggs + 4 oz milk (18 grams protein)  Lunch: chicken sandwich with 4 oz water (24 grams protein)   Dinner: tuna pasta salad with 1 garlic bread + 4 oz water (15 grams protein)   Snack: chips; sugar free pudding (4 grams protein)   Beverages: water     8 day average protein intake 52 grams protein      Exercise: Patient working with PT and OT.      NUTRITION FOCUSED PHYSICAL ASSESSMENT (NFPA) FOR DIAGNOSING MALNUTRITION  Yes         Observed:   No nutrition-related physical findings observed     Obtained from Chart/Interdisciplinary Team:  Non-healing wound near G tube      LABS  Labs reviewed     MEDICATIONS/SUPPLEMENTS  Medications/supplements reviewed-no MVI      ANTHROPOMETRICS   Height: 5'8\"  Weight: 172 lbs (11/22/2021) 179.6 lbs (10/18/2021)  BMI (kg/m2): 26.1 kg/m2  Weight Status:  Overweight   %IBW: 116%  Weight History: Patient with 6% weight loss in past 2 months.   Wt Readings from Last 10 Encounters:   11/22/21 78 kg (172 lb)   10/18/21 81.5 kg (179 lb 9.6 oz)   09/13/21 83.1 kg (183 lb 3.2 oz)   08/18/21 80.7 kg (178 lb)   08/02/21 80.4 kg (177 lb 3.2 oz)   07/19/21 83.7 kg (184 lb 9.6 oz)   07/05/21 81.8 kg (180 lb 6.4 oz)   02/19/21 108.4 kg (239 lb)   02/02/21 69.4 kg (153 lb)   02/02/21 69.4 kg (153 lb)      ASSESSED NUTRITION NEEDS  Estimated Energy Needs: 0065-5707 kcals/day (25-30 Kcal/Kg)  Justification:  (maintenance)  Estimated Protein Needs:  grams protein/day (1.2-1.5 g pro/Kg)  Justification:  (preservation of lean body mass)  Estimated Fluid Needs: 1473-3732 mL/day (30-35 mL/kg)    "   NUTRITION SUPPORT ENTERAL:  Type of Feeding Tube: G tube   Enteral Frequency:  Cyclic  Enteral Regimen: Isosource 1.5   Total Enteral Provisions:Isosource 1.5 at 35 mL/hr x 11 hours -7 PM - 8 AM) to provide 575 calories, 26 grams protein, 292 mL free fluid and 6 grams fiber  1440 water flush + 292 free water from TF + 540 mL oral fluids = 2272 mL (28 mL/kg ABW)  Adjust fluid flushes 180 mL water flush 8 times per day   180 mL water flush before and after tube feeding (8 PM and 7 AM)  180 mL water flush 9 AM, 11 AM, 1 PM, 3 PM, 5 PM, 7 PM       ASSESSED MALNUTRITION STATUS  % Weight Loss:  None noted  % Intake:  No decreased intake noted  Subcutaneous Fat Loss:  None observed  Loss of Muscle Mass:  None observed  Fluid Retention:  None noted     Malnutrition Diagnosis:  Patient does not meet two of the above criteria necessary for diagnosing malnutrition     EVALUATION/PROGRESS TOWARDS GOALS  Previous nutrition goals:  Weekly weight-improving   Calorie count for 7 days (portion provided and amount eaten)-did not receive   Isosource 1.5 at 35 mL/hr x 11 hours (8 PM-7AM)  180 mL water flush at 8 PM and 7 AM (before and after TF administration)-met  180 mL water flush 9 AM, 11 PM, 1 PM, 3 PM, 5 PM, 7 PM-met  Add protein source at breakfast-N/A as no food record  Measure protein source at lunch and dinner-N/A as no food record      Previous nutrition diagnosis:  Excessive nutrient intake related to G-tube feeding and oral intake as evidenced by no reduction in TF since started po intake and 15% weight gain -no change, modified below      Current nutrition diagnosis: Excessive nutrient intake related to G-tube feeding and oral intake as evidenced by 15% weight gain in 8 months and recent 6% weight loss with reduction on TF regimen      INTERVENTIONS   Nutrition Prescription   Recommend 7 day calorie count to assess oral intake to wean TF; add protein source at breakfast; protein bar as snack      IMPLEMENTATION    Assessed learning needs and learning preference  Teaching Method(s) used: Explanation  Vitamin and Mineral Supplements: suggest complete multivitamin and mineral once weaned off TF  Diet Education:  Provided education on calorie intake   Nutrition Education (Content):              a)  Discussed progress towards goals.  RD did not receive food log.  Patient's TF running at 35 mL/hr x 11 hours.  Appreciate consistent weight on patient.  Explained to father and staff that fluid needs are the biggest challenge to remove TF as patient cannot currently meet fluid needs orally. Patient requires 70-80 fluids per day and patient currently consuming 12 oz per day on average.  Note orders to work with speech therapist.  Speech therapist will need to evaluate safety for fluids using cup and syringe.  Patient's family and staff member verbalizes understanding of next steps.  Other: Called [facility] to inform didn't receive food log.  Left voicemail asking to refax food logs.      GOALS  Weekly weight  Calorie count for 7 days (portion provided and amount eaten)  Isosource 1.5 at 35 mL/hr x 11 hours (8 PM-7AM)  180 mL water flush at 8 PM and 7 AM (before and after TF administration)  180 mL water flush 9 AM, 11 PM, 1 PM, 3 PM, 5 PM, 7 PM  Add protein source at breakfast  Measure protein source at lunch and dinner      FOLLOW UP/MONITORING   Progress towards goals will be monitored and evaluated per protocol and Practice Guidelines  Group home staff to send weight and food logs prior to next appointment  Patient to follow up in 7 weeks  RD name and number provided      Time Spent with Patient  20 minutes     Lauryn Licona, RD, LD  [facility] Outpatient Dietitian  [phone] (office phone)

## 2021-11-22 NOTE — TELEPHONE ENCOUNTER
Reason for Call:  Medication or medication refill:Refill    Do you use a [facility] [place] [facility]?  Name of the pharmacy and phone number for the current request:    [facility] - [place], [place] - [address] [place] [address]    Name of the medication requested: LORazepam (ATIVAN) 0.5 MG tablet    Other request: na    Can we leave a detailed message on this number? YES    Phone number patient can be reached at: Other phone number:  Pharm: see above     Best Time: any    Call taken on 11/22/2021 at 9:06 AM by Carl Serrano

## 2021-11-24 VITALS — BODY MASS INDEX: 26.15 KG/M2 | WEIGHT: 172 LBS

## 2021-12-06 ENCOUNTER — TELEPHONE (OUTPATIENT)
Dept: INTERNAL MEDICINE | Facility: CLINIC | Age: 41
End: 2021-12-06
Payer: COMMERCIAL

## 2021-12-06 NOTE — TELEPHONE ENCOUNTER
Reason for Call:  Form, our goal is to have forms completed with 72 hours, however, some forms may require a visit or additional information.    Type of letter, form or note:  [employer]    Who is the form from?: [employer] (if other please explain)    Where did the form come from: form was faxed in    What clinic location was the form placed at?: Internal Medicine    Where the form was placed: Pcp's Folder    What number is listed as a contact on the form?: [phone]       Additional comments: Please fax completed form to [phone].    Call taken on 12/6/2021 at 9:12 AM by Ashley Roldan

## 2021-12-08 ENCOUNTER — VIRTUAL VISIT (OUTPATIENT)
Dept: FAMILY MEDICINE | Facility: CLINIC | Age: 41
End: 2021-12-08
Payer: COMMERCIAL

## 2021-12-08 ENCOUNTER — NURSE TRIAGE (OUTPATIENT)
Dept: INTERNAL MEDICINE | Facility: CLINIC | Age: 41
End: 2021-12-08

## 2021-12-08 DIAGNOSIS — L03.119 CELLULITIS AND ABSCESS OF LEG: Primary | ICD-10-CM

## 2021-12-08 DIAGNOSIS — S06.9X9S TRAUMATIC BRAIN INJURY WITH LOSS OF CONSCIOUSNESS, SEQUELA (H): ICD-10-CM

## 2021-12-08 DIAGNOSIS — L02.419 CELLULITIS AND ABSCESS OF LEG: Primary | ICD-10-CM

## 2021-12-08 DIAGNOSIS — F34.81 DISRUPTIVE MOOD DYSREGULATION DISORDER (H): ICD-10-CM

## 2021-12-08 PROCEDURE — 99214 OFFICE O/P EST MOD 30 MIN: CPT | Mod: 95 | Performed by: NURSE PRACTITIONER

## 2021-12-08 RX ORDER — SULFAMETHOXAZOLE/TRIMETHOPRIM 800-160 MG
1 TABLET ORAL 2 TIMES DAILY
Qty: 20 TABLET | Refills: 0 | Status: SHIPPED | OUTPATIENT
Start: 2021-12-08 | End: 2021-12-18

## 2021-12-08 RX ORDER — PAROXETINE 30 MG/1
30 TABLET, FILM COATED ORAL EVERY MORNING
Qty: 30 TABLET | Refills: 3 | Status: SHIPPED | OUTPATIENT
Start: 2021-12-08 | End: 2022-04-04

## 2021-12-08 NOTE — PROGRESS NOTES
"Scot is a 41 year old who is being evaluated via a billable video visit.      How would you like to obtain your AVS? MyChart  If the video visit is dropped, the invitation should be resent by: Text to cell phone: [phone] Group home  Will anyone else be joining your video visit? No      Video Start Time: 12:23 PM    Assessment & Plan       ICD-10-CM    1. Cellulitis and abscess of leg  L03.119     L02.419    2. Traumatic brain injury with loss of consciousness, sequela (H)  S06.9X9S      Challenging virtual visit since patient is essentially nonverbal second to a TBI and his caregiver with language barrier with poor video quality.  Patient appears to have cellulitis and abscess of his right lower leg it is erythematic caregiver confirmed it is warm to touch there appears to be abscess formation.  Again this was a video and quality was poor.  I started patient on Septra DS 1 tab twice daily for 7 days he needs to be seen in clinic for possible I&D.  If patient develops any fever chills or systemic symptoms he is to go to the emergency department.  Route charted to  to help schedule this.             BMI:   Estimated body mass index is 26.15 kg/m  as calculated from the following:    Height as of 2/19/21: 1.727 m (5' 8\").    Weight as of 11/22/21: 78 kg (172 lb).           No follow-ups on file.    TERRENCE Zarate [facility]    Carlos Ellison is a 41 year old who presents for the following health issues  accompanied by his care staff at the group home.    History of Present Illness       He eats 2-3 servings of fruits and vegetables daily.He consumes 8 sweetened beverage(s) daily.He exercises with enough effort to increase his heart rate 9 or less minutes per day.  He exercises with enough effort to increase his heart rate 3 or less days per week.   He is taking medications regularly.       Patient is non verbal, patient care staff will be speaking on behalf of " patient.   Pictures of leg sent through apprupt.   Patient was advised to go to urgent care, but patient has lots of behavioral issues and difficult getting patient to go to urgent care.     This was a video call with patient's caregiver (language barrier) patient is essentially nonverbal secondary to a TBI due to cardiac arrest from NSTEMI in 2020.  He lives in a group home has history of MRSA skin infections.  Caregiver noticed his right lower leg was red and warm this morning.  This is what prompted a virtual visit today.  She was instructed to go to the urgent care for evaluation but says that it is hard to get patient into clinic due to his behavioral issues.  They sent pictures in this morning I reviewed these his lower extremity is erythematic.  Video call quality was poor today could only partially see patients leg it appeared erythematic appeared warm caregiver confirmed this and there appears to be an abscess.  It likely requires incision and drainage.  Advised caregiver that patient will need to come into clinic.  Caregiver denies any fever, chills.  Patient is eating drinking and acting his usual self.        Review of Systems   Constitutional, HEENT, cardiovascular, pulmonary, GI, , musculoskeletal, neuro, skin, endocrine and psych systems are negative, except as otherwise noted.      Objective           Vitals:  No vitals were obtained today due to virtual visit.    Physical Exam   GENERAL: Healthy, alert and no distress  EYES: Eyes grossly normal to inspection.  No discharge or erythema, or obvious scleral/conjunctival abnormalities.  RESP: No audible wheeze, cough, or visible cyanosis.  No visible retractions or increased work of breathing.    SKIN: Right lower leg appears erythematic, with a possible abscess  NEURO: Cranial nerves grossly intact.  Mentation and speech appropriate for age.  PSYCH: Mentation appears normal, affect normal/bright, judgement and insight intact, normal speech and  appearance well-groomed.    Office Visit on 11/16/2021   Component Date Value Ref Range Status     WBC Count 11/16/2021 8.5  4.0 - 11.0 10e3/uL Final     RBC Count 11/16/2021 4.45  4.40 - 5.90 10e6/uL Final     Hemoglobin 11/16/2021 13.6  13.3 - 17.7 g/dL Final     Hematocrit 11/16/2021 42.6  40.0 - 53.0 % Final     MCV 11/16/2021 96  78 - 100 fL Final     MCH 11/16/2021 30.6  26.5 - 33.0 pg Final     MCHC 11/16/2021 31.9  31.5 - 36.5 g/dL Final     RDW 11/16/2021 12.8  10.0 - 15.0 % Final     Platelet Count 11/16/2021 198  150 - 450 10e3/uL Final     Sodium 11/16/2021 140  133 - 144 mmol/L Final     Potassium 11/16/2021 4.3  3.4 - 5.3 mmol/L Final     Chloride 11/16/2021 109  94 - 109 mmol/L Final     Carbon Dioxide (CO2) 11/16/2021 25  20 - 32 mmol/L Final     Anion Gap 11/16/2021 6  3 - 14 mmol/L Final     Urea Nitrogen 11/16/2021 15  7 - 30 mg/dL Final     Creatinine 11/16/2021 0.80  0.66 - 1.25 mg/dL Final     Calcium 11/16/2021 9.0  8.5 - 10.1 mg/dL Final     Glucose 11/16/2021 108* 70 - 99 mg/dL Final     Alkaline Phosphatase 11/16/2021 112  40 - 150 U/L Final     AST 11/16/2021 7  0 - 45 U/L Final     ALT 11/16/2021 25  0 - 70 U/L Final     Protein Total 11/16/2021 8.1  6.8 - 8.8 g/dL Final     Albumin 11/16/2021 3.6  3.4 - 5.0 g/dL Final     Bilirubin Total 11/16/2021 0.5  0.2 - 1.3 mg/dL Final     GFR Estimate 11/16/2021 >90  >60 mL/min/1.73m2 Final    As of July 11, 2021, eGFR is calculated by the CKD-EPI creatinine equation, without race adjustment. eGFR can be influenced by muscle mass, exercise, and diet. The reported eGFR is an estimation only and is only applicable if the renal function is stable.     Cholesterol 11/16/2021 100  <200 mg/dL Final     Triglycerides 11/16/2021 170* <150 mg/dL Final     Direct Measure HDL 11/16/2021 39* >=40 mg/dL Final     LDL Cholesterol Calculated 11/16/2021 27  <=100 mg/dL Final     Non HDL Cholesterol 11/16/2021 61  <130 mg/dL Final     Patient Fasting > 8hrs?  11/16/2021 No   Final    1100  1100  1100  1100  1100               Video-Visit Details    Type of service:  Video Visit    Video End Time:12:46 PM    Originating Location (pt. Location): Home    Distant Location (provider location):  [facility]     Platform used for Video Visit: Albertina

## 2021-12-08 NOTE — TELEPHONE ENCOUNTER
"Nurse Triage SBAR    Is this a 2nd Level Triage? YES, LICENSED PRACTITIONER REVIEW IS REQUIRED    Situation  : Patient has history of MRSA and new boil that is red. Redness of 5 inch [place], no red streaks. No fever. History of TBI states that sometimes does not run a fever.    Background  : History of TBI and MRSA. Due to TBI unable to assess if patient has new weakness. Patient lives in a group home and MRSA is present in all the residents.     Assessment : Active MRSA infection. Patient needs antibiotic.    (See information below for more triage details.)    Recommendation : Routing to provider for recommendation on OK for E visit? or needs virtual visit as a double book. Group home does not have the staff to bring patient into clinic and patient is not cooperative with coming into clinic. Group [place] would attach a picture to E visit.    Protocol Recommended Disposition: Urgent office follow-up appointment   Reason for Disposition    Boil > 2 inches across (> 5 cm; larger than a golf ball or ping pong ball)    Additional Information    Negative: Widespread rash and bright red, sunburn-like and too weak to stand    Negative: Sounds like a life-threatening emergency to the triager    Negative: Painful lump or swelling at opening to anus (rectum)    Negative: Painful lump or swelling at opening to vagina (on labia)    Negative: Painful lump or swelling on scrotum    Negative: Doesn't match the SYMPTOMS of a boil    Negative: Widespread red rash    Negative: Black (necrotic) color or blisters develop in wound    Negative: Patient sounds very sick or weak to the triager    Negative: SEVERE pain (e.g., excruciating)    Negative: Red streak from area of infection    Negative: Fever > 100.4 F (38.0 C)    Answer Assessment - Initial Assessment Questions  1. APPEARANCE of BOIL: \"What does the boil look like?\"       Large pimple the size of dime, red, hot and swollen. 5 inch area of redness  2. LOCATION: \"Where is the boil " "located?\"       Outer right ankle, right knee about the same size.   3. NUMBER: \"How many boils are there?\"       1 on right ankle, quite a few actually  4. SIZE: \"How big is the boil?\" (e.g., inches, cm; compare to size of a coin or other object)      Boil 1 cm, 5 inch area of redness  5. ONSET: \"When did the boil start?\"      Redness started this morning.  6. PAIN: \"Is there any pain?\" If so, ask: \"How bad is the pain?\"   (Scale 1-10; or mild, moderate, severe)      Non verbal due to TBI  7. FEVER: \"Do you have a fever?\" If so, ask: \"What is it, how was it measured, and when did it start?\"       No fever  8. SOURCE: \"Have you been around anyone with boils or other Staph infections?\" \"Have you ever had boils before?\"      Whole group home has history of MRSA  9. OTHER SYMPTOMS: \"Do you have any other symptoms?\" (e.g., shaking chills, weakness, rash elsewhere on body)      no  10. PREGNANCY: \"Is there any chance you are pregnant?\" \"When was your last menstrual period?\"        NA    Protocols used: BOIL (SKIN ABSCESS)-A-OH  Marguerite Torres RN    "

## 2021-12-08 NOTE — TELEPHONE ENCOUNTER
Date of Last Office Visit: 10/07/2021  Date of Next Office Visit: 12/31/2021  No shows since last visit: 0  Cancellations since last visit: 0    Medication requested: PARoxetine (PAXIL) 30 MG  Date last ordered: 09/02/2021 Qty: 30 Refills: 1     Lapse in medication adherence greater than 5 days?: no  If yes, call patient and gather details: n/a  Medication refill request verified as identical to current order?: yes  Result of Last DAM, VPA, Li+ Level, CBC, or Carbamazepine Level (at or since last visit): N/A    Last visit treatment plan:     1.    Continue valproic acid  500 mg (10 ml) twice daily     2.    Discontinue benztropine      3.    Decrease Olanzapine to 2.5 mg at bedtime only    4.    Continue paroxetine 30 mg daily    5.    Continue amantadine 100 mg twice daily    6.   Continue Lorazepam 0.5 mg twice daily      []Medication refilled per  Medication Refill in Ambulatory Care  policy.  [x]Medication unable to be refilled by RN due to criteria not met as indicated below:    []Eligibility - not seen in the last year   []Supervision - no future appointment   []Compliance - no shows, cancellations or lapse in therapy   []Verification - order discrepancy   []Controlled medication   [x]Medication not included in policy   []90-day supply request   []Other

## 2021-12-09 ENCOUNTER — PATIENT OUTREACH (OUTPATIENT)
Dept: CARE COORDINATION | Facility: CLINIC | Age: 41
End: 2021-12-09
Payer: COMMERCIAL

## 2021-12-09 NOTE — LETTER
[address] [place] [address]    [facility]  Patient Centered Plan of Care  Updated 12/9/2021  About Me:        Patient Name:  Scot Bishop    YOB: 1980  Age:         41 year old   Heather MRN:    [id number] Telephone Information:  Home Phone [phone]   Mobile [phone]       Address:  [address] [address] Email address:  [email or web address]      Emergency Contact(s)    Name Relationship Lgl Grd Work Phone Home Phone Mobile Phone   1. AMOS BISHOP* Father Yes  [phone]   2. SUZI BISHOP* Mother No  [phone]   3. [employer] GROUP HO*    [phone]    4. LAMINE, PROGRAM *     [phone]   5. MITZYLOS Stepparent No  [phone]           Primary language:  English     needed? No   [place] [facility]:  [phone] op. 1  Other communication barriers:Cognitive impairment    Preferred Method of Communication:     Current living arrangement: Other (group home)    Mobility Status/ Medical Equipment: Dependent/Assisted by Another        Health Maintenance  Health Maintenance Reviewed: Due/Overdue There are no preventive care reminders to display for this patient.        My Access Plan  Medical Emergency 911   Primary Clinic Line   - [phone]   24 Hour Appointment Line [phone] or  [phone]) (toll-free)   24 Hour Nurse Line [phone] (toll-free)   Preferred Urgent Care [facility], [phone]     Preferred Hospital [facility]  [phone]     Preferred Pharmacy [facility] [place] - [address] [place] [address]     Behavioral Health Crisis Line The National Suicide Prevention Lifeline at 1-975.820.8718 or 911             My Care Team Members  Patient Care Team       Relationship Specialty Notifications Start End    Cachorro Morales MD PCP - General Internal Medicine  1/19/21     Phone: [phone] Fax:  [phone]         [address] [place] [id number]    Scot Matamoros MD MD Cardiology  6/12/20     Phone: [phone] Fax: [phone]         [address] [facility] [address]    Cachorro Morales MD Assigned PCP   12/24/20     Phone: [phone] Fax: [phone]         [address] [place] [address]    EVELINA Myles CM    2/5/21     [facility]    Phone: [phone]         [facility] group home     2/5/21     [facility] group home    Phone: [phone] Fax: [phone]        Melanie financial worker Financial Resource Worker   2/5/21     [facility]    Phone: [phone]         Elsa Rahman NP Assigned Behavioral Health Provider   2/14/21     Phone: [phone] Fax: [phone]         [address] [place] [address]    Bibi Marcus MD Assigned Heart and Vascular Provider   3/17/21     Phone: [phone] Pager: [phone] Fax: [phone]        [address] [facility] [address]    Candace Perez RN Lead Care Coordinator  [facility] 8/19/21             My Care Plans  Self Management and Treatment Plan  Goals and (Comments)  Goals        General     Functional (pt-stated)      Notes - Note edited  12/9/2021  2:28 PM by Candace Perez RN     Goal Statement: Patient will have a plan for future rehabilitation services after home care PT ends which would include outpatient Physical Therapy  Date Goal set: 8/20/2021  Barriers: Deconditioned   Strengths:Supportive parents   Date to Achieve By: 12/20/2021 // extended to 6/20/2022  Patient expressed understanding of goal: Father agrees with the plan   Action steps to achieve this goal:  1. I/caregiver will keep future appointments; 12/10/2021 Swallow Eval, 12/20/2021 Outpatient Physical Therapy; Mental Health Clinic 12/31/2021, 1/3/2022 Outpatient Physical Therapy, 1/17/2022 Nutrition  2. I/caregiver will discuss, review, schedule and complete  recommended overdue health maintenance with my primary care provider  3. I/caregiver will contact my care team with questions, concerns, support needs   4. I/caregiver will use the clinic as a resource, and I understand I can contact my clinic with 24/7 after hours services available   5. Care Coordinator will remain available as needed               Action Plans on File:                       Advance Care Plans/Directives Type:   No data recorded    My Medical and Care Information  Problem List   Patient Active Problem List   Diagnosis     Cardiac arrest (H)     Traumatic brain injury (H)     Thrush, oral     Yeast infection     Nicotine dependence     Nonverbal     Gastrojejunostomy tube status (H)     G tube feedings (H)     Advance care planning     Dysphagia     Cognitive disorder     Backache     Back injury, sequela     Agitation     Aggression     Acute respiratory failure with hypoxia (H)     Thrombocytopenia, unspecified (H)     CARDIOVASCULAR SCREENING; LDL GOAL LESS THAN 100      Current Medications and Allergies:  No Known Allergies  Current Outpatient Medications   Medication     acetaminophen (TYLENOL) 325 MG tablet     amantadine (SYMMETREL) 50 MG/5ML syrup     ASPIRIN LOW DOSE 81 MG chewable tablet     atorvastatin (LIPITOR) 40 MG tablet     bacitracin 500 UNIT/GM OINT     docusate sodium (COLACE) 100 MG capsule     Emollient (AQUAPHOR ADVANCED THERAPY) OINT     LORazepam (ATIVAN) 0.5 MG tablet     metoprolol tartrate (LOPRESSOR) 25 MG tablet     nystatin (MYCOSTATIN) 698622 UNIT/GM external cream     OLANZapine (ZYPREXA) 2.5 MG tablet     PARoxetine (PAXIL) 30 MG tablet     sulfamethoxazole-trimethoprim (BACTRIM DS) 800-160 MG tablet     ticagrelor (BRILINTA) 90 MG tablet     Valproate Sodium (VALPROIC ACID) 250 MG/5ML     No current facility-administered medications for this visit.         Care Coordination Start Date: 8/20/2021   Frequency of Care Coordination: monthly     Form Last Updated:  12/09/2021

## 2021-12-09 NOTE — PROGRESS NOTES
Clinic Care Coordination Contact    Follow Up Progress Note      Assessment:  -Video visit 12/8/2021 with questionable LLE cellulitis and recommendation to have patient seen for possible I&D  -Gerald,  is aware and will call the [facility] and speak to José Miguel  for an update     Care Gaps:    There are no preventive care reminders to display for this patient.    There are no care gaps    Goals addressed this encounter:   Goals Addressed                    This Visit's Progress       Functional (pt-stated)   60%      Goal Statement: Patient will have a plan for future rehabilitation services after home care PT ends which would include outpatient Physical Therapy  Date Goal set: 8/20/2021  Barriers: Deconditioned   Strengths:Supportive parents   Date to Achieve By: 12/20/2021 // extended to 6/20/2022  Patient expressed understanding of goal: Father agrees with the plan   Action steps to achieve this goal:  1. I/caregiver will keep future appointments; 12/10/2021 Swallow Eval, 12/20/2021 Outpatient Physical Therapy; Mental Health Clinic 12/31/2021, 1/3/2022 Outpatient Physical Therapy, 1/17/2022 Nutrition  2. I/caregiver will discuss, review, schedule and complete recommended overdue health maintenance with my primary care provider  3. I/caregiver will contact my care team with questions, concerns, support needs   4. I/caregiver will use the clinic as a resource, and I understand I can contact my clinic with 24/7 after hours services available   5. Care Coordinator will remain available as needed              Intervention/Education provided during outreach:   RNCC called and spoke with patient; introduced self, discussed role of Care Coordination and explained reason for call     Outreach Frequency: monthly    Plan:   -Patient will contact the care team with questions, concerns, support needs   -Patient will use the clinic as a resource and understands (s)he can contact the [place]  clinic with 24/7 after hours services available  -Care Coordinator will remain available as needed  -RNCC will follow up in one month for follow up appointments/recommendations and goal progression    Care Coordinator will follow up in 1 month    [facility]   Candace Perez RN, Care Coordinator   [facility] Karla [place], [facility], [facility], [place]  E-mail [email or web address]   [phone]

## 2021-12-10 ENCOUNTER — HOSPITAL ENCOUNTER (OUTPATIENT)
Dept: SPEECH THERAPY | Facility: CLINIC | Age: 41
Discharge: HOME | End: 2021-12-10
Attending: INTERNAL MEDICINE | Admitting: INTERNAL MEDICINE
Payer: COMMERCIAL

## 2021-12-10 DIAGNOSIS — S06.9X9S TRAUMATIC BRAIN INJURY WITH LOSS OF CONSCIOUSNESS, SEQUELA (H): ICD-10-CM

## 2021-12-10 PROCEDURE — 92610 EVALUATE SWALLOWING FUNCTION: CPT | Mod: GN | Performed by: SPEECH-LANGUAGE PATHOLOGIST

## 2021-12-20 ENCOUNTER — TELEPHONE (OUTPATIENT)
Dept: NUTRITION | Facility: CLINIC | Age: 41
End: 2021-12-20
Payer: COMMERCIAL

## 2021-12-20 NOTE — PROGRESS NOTES
Follow up call to group home director, José Miguel.  Left voicemail requesting weekly weights and tracking of all fluid ounces and po intake including portion sizes of foods eaten.  Requested data be sent to RD by 1/5/2021. Left RD name and number for further questions.    Lauryn Licona, RD, LD  [facility] Outpatient Dietitian  [phone] (office phone)

## 2021-12-30 NOTE — PROGRESS NOTES
Collaborative Care Psychiatry Service (CCPS)  December 31, 2021    Behavioral Health Clinician Progress Note    Patient Name: Scot Bishop      Telemedicine Visit: The patient's condition can be safely assessed and treated via synchronous audio and visual telemedicine encounter.      Reason for Telemedicine Visit: Services only offered telehealth    Originating Site (Patient Location): Patient's home    Distant Site (Provider Location): Provider Remote Setting- Home Office    Consent:  The patient/guardian has verbally consented to: the potential risks and benefits of telemedicine (video visit) versus in person care; bill my insurance or make self-payment for services provided; and responsibility for payment of non-covered services.     Mode of Communication:  Video Conference via [employer]    As the provider I attest to compliance with applicable laws and regulations related to telemedicine.         Service Type:  Individual      Service Location:   [employer] / Email (patient reached)     Session Start Time: 115pm  Session End Time: 125pm      Session Length: 16 - 37      Attendees: Patient, Father and care center staff    Visit Activities (Refresh list every visit): [facility] Only    Diagnostic Assessment Date: 02/11/2021  See Flowsheets for today's PHQ-9 and CLEMENT-7 results  Previous PHQ-9:   PHQ-9 SCORE 11/15/2021   PHQ-9 Total Score MyChart 4 (Minimal depression)   PHQ-9 Total Score 4       Previous CLEMENT-7: No flowsheet data found.    WHODAS  No flowsheet data found.     CAGE  No flowsheet data found.      DATA  Extended Session (60+ minutes): No  Interactive Complexity: No  Crisis: No    Medication Compliance:  Yes      Chemical Use Review:   Substance Use: Chemical use reviewed, no active concerns identified      Tobacco Use: No current tobacco use.      Current Stressors / Issues:  MH update: Scot is non-verbal due to effects of heart attack. He resides at a care center. Care center staff report little change.  Some crying spells,kicking which can make transport difficult. Care center staff think it's gotten worse since July.  Gerald, father thinks this is caused by agitation. Scot has been going to PT and OT outside the facility recently. Gerald reports that Scot is improving some motor skills as a result.   Stresses: leaving the facility to go to appointments seems cause increase in agitation  Appetite: unremarkable  Sleep: unremarkable  Therapy: NA  GABRIELLE: NA  Preg:NA  Interventions:  Most important: get off meds (Gerald), PRN for his agitation?       Progress on Treatment Objective(s) / Homework:  No improvement - ACTION (Actively working towards change); Intervened by reinforcing change plan / affirming steps taken    Motivational Interviewing    MI Intervention: Co-Developed Goal: manage agitation, Expressed Empathy/Understanding, Supported Autonomy, Collaboration, Evocation, Permission to raise concern or advise, Open-ended questions, Reflections: simple and complex, Change talk (evoked) and Reframe     Change Talk Expressed by the Patient: Desire to change Ability to change Reasons to change Need to change Committment to change Activation Taking steps    Provider Response to Change Talk: E - Evoked more info from patient about behavior change, A - Affirmed patient's thoughts, decisions, or attempts at behavior change, R - Reflected patient's change talk and S - Summarized patient's change talk statements        Review of Symptoms per patient report:  Depression: Psychomotor slowing or agitation and Feeling sad, down, or depressed  Erma:  No Symptoms  Psychosis: No Symptoms  Anxiety: No Symptoms  Panic:  No symptoms  Post Traumatic Stress Disorder:  No Symptoms   Eating Disorder: uses feeding tube  ADD / ADHD:  No symptoms  Conduct Disorder: No symptoms  Autism Spectrum Disorder: No symptoms  Obsessive Compulsive Disorder: No Symptoms      Changes in Health Issues:   None reported    Assessment: Current Emotional /  Mental Status (status of significant symptoms):  Risk status (Self / Other harm or suicidal ideation)  Patient denies a history of suicidal ideation, suicide attempts, self-injurious behavior, homicidal ideation, homicidal behavior and and other safety concerns  Patient denies current fears or concerns for personal safety.  Patient denies current or recent suicidal ideation or behaviors.  Patient denies current or recent homicidal ideation or behaviors.  Patient denies current or recent self injurious behavior or ideation.  Patient denies other safety concerns.  A safety and risk management plan has not been developed at this time, however patient was encouraged to call [place] [phone] should there be a change in any of these risk factors.    Appearance:   Appropriate   Eye Contact:   Good   Psychomotor Behavior: Normal   Attitude:   Cooperative   Orientation:   All  Speech   Rate / Production: Normal    Volume:  Normal   Mood:    Anxious  Depressed   Affect:    agitated   Thought Content:  Clear   Thought Form:  Coherent  Logical   Insight:    Good     Diagnoses:  1. Depression, major, recurrent, mild (H)        Collateral Reports Completed:  Communicated with Elsa Rahman, MSN, APRN, CNP, [facility]    Plan: (Homework, other):  Patient was given information about behavioral services and encouraged to schedule a follow up appointment with the clinic [facility] in conjunction with next CCPS appointment.  He was also given information about mental health symptoms and treatment options .  CD Recommendations: No indications of CD issues.  Candace WADSWORTH [facility]      TRISHA Medina  December 31, 2021

## 2021-12-31 ENCOUNTER — VIRTUAL VISIT (OUTPATIENT)
Dept: PSYCHIATRY | Facility: CLINIC | Age: 41
End: 2021-12-31
Payer: COMMERCIAL

## 2021-12-31 ENCOUNTER — VIRTUAL VISIT (OUTPATIENT)
Dept: BEHAVIORAL HEALTH | Facility: CLINIC | Age: 41
End: 2021-12-31
Payer: COMMERCIAL

## 2021-12-31 DIAGNOSIS — F43.22 ADJUSTMENT DISORDER WITH ANXIOUS MOOD: Primary | ICD-10-CM

## 2021-12-31 DIAGNOSIS — F34.81 DISRUPTIVE MOOD DYSREGULATION DISORDER (H): ICD-10-CM

## 2021-12-31 DIAGNOSIS — G25.9 EXTRAPYRAMIDAL RIGIDITY: ICD-10-CM

## 2021-12-31 DIAGNOSIS — F33.0 DEPRESSION, MAJOR, RECURRENT, MILD (H): Primary | ICD-10-CM

## 2021-12-31 PROCEDURE — 99214 OFFICE O/P EST MOD 30 MIN: CPT | Mod: 95 | Performed by: NURSE PRACTITIONER

## 2021-12-31 RX ORDER — LORAZEPAM 0.5 MG/1
0.5 TABLET ORAL
Qty: 60 TABLET | Refills: 1 | Status: SHIPPED | OUTPATIENT
Start: 2021-12-31 | End: 2022-02-16

## 2021-12-31 RX ORDER — VALPROIC ACID 250 MG/5ML
500 SOLUTION ORAL 2 TIMES DAILY
Qty: 600 ML | Refills: 1 | Status: SHIPPED | OUTPATIENT
Start: 2021-12-31 | End: 2022-03-17

## 2021-12-31 RX ORDER — HYDROXYZINE HYDROCHLORIDE 10 MG/1
10 TABLET, FILM COATED ORAL DAILY PRN
Qty: 30 TABLET | Refills: 3 | Status: SHIPPED | OUTPATIENT
Start: 2021-12-31 | End: 2022-04-22

## 2021-12-31 NOTE — PROGRESS NOTES
"Scot is a 41 year old who is being evaluated via a billable video visit.      How would you like to obtain your AVS? MyChart  If the video visit is dropped, the invitation should be resent by: Text to cell phone: [phone]  Will anyone else be joining your video visit? No      Video Start Time: 1:30 PM  Video-Visit Details    Type of service:  Video Visit    Video End Time:1:58 PM    Originating Location (pt. Location): Home    Distant Location (provider location):  [facility] MENTAL [facility] & ADDICTION [facility]     Platform used for Video Visit: [facility]         Outpatient Psychiatric Progress Note    Name: Scot Bishop   : 1980                    Primary Care Provider: Cachorro Morales MD   Therapist: None    PHQ-9 scores:  PHQ-9 SCORE 11/15/2021   PHQ-9 Total Score MyChart 4 (Minimal depression)   PHQ-9 Total Score 4       CLEMENT-7 scores:  No flowsheet data found.    Patient Identification:    Patient is a 41 year old year old, single  White Not  or  male  who presents for return visit with me.  Patient is currently disabled. Patient attended the session with His father and group home care team staff person , who they agreed to have interview with. Patient prefers to be called: \" Scot\".    Current medications include: acetaminophen (TYLENOL) 325 MG tablet, Take 1 tablet (325 mg) by mouth every 6 hours as needed for mild pain or fever  ASPIRIN LOW DOSE 81 MG chewable tablet, TAKE ONE TABLET PER G TUBE EVERY DAY  atorvastatin (LIPITOR) 40 MG tablet, TAKE ONE TABLET PER G TUBE EVERY NIGHT AT BEDTIME  bacitracin 500 UNIT/GM OINT, Apply 1 Application topically 2 times daily  - Topical as needed  docusate sodium (COLACE) 100 MG capsule, Take 1 capsule (100 mg) by mouth 2 times daily as needed for constipation  Emollient (AQUAPHOR ADVANCED THERAPY) OINT, Apply topically 2 times daily As needed.  LORazepam (ATIVAN) 0.5 MG tablet, Take 1 tablet (0.5 mg) by mouth 2 times " daily  metoprolol tartrate (LOPRESSOR) 25 MG tablet, TAKE 1/2 TABLET BY MOUTH TWICE A DAY  nystatin (MYCOSTATIN) 744554 UNIT/GM external cream, Apply topically 2 times daily As needed  PARoxetine (PAXIL) 30 MG tablet, Take 1 tablet (30 mg) by mouth every morning  ticagrelor (BRILINTA) 90 MG tablet, Take 1 tablet (90 mg) by mouth 2 times daily  Valproate Sodium (VALPROIC ACID) 250 MG/5ML, Take 10 mLs (500 mg) by mouth 2 times daily    No current facility-administered medications on file prior to visit.       The [place] Prescription Monitoring Program has been reviewed and there are no concerns about diversionary activity for controlled substances at this time.      I was able to review most recent Primary Care Provider, specialty provider, and therapy visit notes that I have access to.     Today, his father and care team staff reports some mood stabilization.  He continues to have periods of irritability when staff want to transition him into other settings and activities.  He is able to eat more orally.  Scot is nonresponsive to my verbal prompts but does engage in brief eye contact with me.  He has made no attempts to harm himself.  His parents would like to see him on as low-dose of a dose of medication as possible.     has a past medical history of Acute respiratory failure with hypoxia (H), Aggression (11/09/2020), Agitation (09/21/2020), LOWELL (acute kidney injury) (H), Back injury, sequela (10/27/2017), Cardiac arrest (H) (05/17/2020), Cardiac arrest (H), Cognitive disorder (11/11/2020), Depressive disorder, Dysphagia (11/11/2020), Dysphagia, Gastrojejunostomy tube status (H) (11/11/2020), HTN (hypertension), Hypoxic ischemic encephalopathy, Low blood pressure (09/17/2020), Nonverbal (11/11/2020), NSTEMI (non-ST elevated myocardial infarction) (H), and TBI (traumatic brain injury) (H) (06/25/2020).    Social history updates:    No changes in Scot's social history today    Substance use updates:    No  alcohol use  Tobacco use: No    Vital Signs:   There were no vitals taken for this visit.    Labs:    Most recent laboratory results reviewed and no new labs.       Mental Status Examination:  Appearance:  somnolent and casually dressed  Attitude:  somewhat cooperative   Eye Contact:  fair  Gait and Station: Uses wheelchair  Psychomotor Behavior:  evidence of dystonia  Oriented to:  Person  Attention Span and Concentration:  Poor  Speech:   mute  Mood:  anxious and depressed  Affect:  restricted range  Associations:  Unable to assess  Thought Process:  linear  Thought Content:  no evidence of suicidal ideation or homicidal ideation  Recent and Remote Memory:  poor Not formally assessed. No amnesia.  Fund of Knowledge: low-normal  Insight:  limited  Judgment:  poor  Impulse Control:  poor    Suicide Risk Assessment:  Today Scot Bishop has made no attempts to try to end his life. 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 However, risk is mitigated by residing in 24-hour supervised care facility. Therefore, based on all available evidence including the factors cited above, Scot Bishop does not appear to be at imminent risk for self-harm, does not meet criteria for a 72-hr hold, and therefore remains appropriate for ongoing outpatient level of care.  A thorough assessment of risk factors related to suicide and self-harm have been reviewed and are noted above. The patient convincingly denies suicidality on several occasions. Local community safety resources printed and reviewed for patient to use if needed. There was no deceit detected, and the patient presented in a manner that was believable.     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  Created by Conversion       Backache 04/16/2021     Priority: Medium     Formatting of this note might be different from the original.  Created by Conversion    Replacement Utility updated for latest IMO load       Thrombocytopenia, unspecified (H) 04/16/2021     Priority: Medium     Thrush, oral 12/09/2020     Priority: Medium     Advance care planning 12/04/2020     Priority: Medium     Formatting of this note might be different from the original.  Community Palliative Care was asked to see patient by inpatient palliative care on 11/23 for continuation of palliative care in a new setting.  Date of last visit: 12/9/2020    Formatting of this note is different from the original.  Advance Care Planning   Patient has identified Health Care Agent(s): Yes  Add Health Care Agents: Yes    Health Care Agent(s):  Guardian: Gerald Bishop Relationship: father Phone: [phone]     Patient has Advance Care Plan Documents (Health Care Directive, POLST): Yes    Advance Care Plan Documents:  POLST Form     Patient has identified Specific Treatment Preferences: Yes     How have preferences been verified: POLST    Specific Treatment Preferences:   a.) Code Status:  DNR/ Do Not Attempt Resuscitation - Allow a Natural Death  b.) Goals of Treatment:   ii. Selective Treatment: Use medical treatment, antibiotics, IV fluids and cardiac monitor as indicated. No intubation, advanced airway interventions, or mechanical ventilation. May consider less invasive airway support (e.g. CPAP, BiPAP). Transfer to hospital if indicated. Generally avoid the intensive care unit. All patients will receive comfort-focused treatments.  TREATMENT PLAN: Provide basic medical treatments aimed at treating new or reversible  illness.  c.) Interventions and Treatments:  i.  Artificially Administered Nutrition and Hydration: - Long term artificial nutrion/hydration by tube through (not specified) (specify mouth/nose) and (not specified) (specify if ok with directly into GI tract)  ii.  Antibiotics: - Oral antibiotics only (NO IV/IM)       Acute respiratory failure with hypoxia (H) 12/04/2020     Priority: Medium     Nonverbal 11/11/2020     Priority: Medium     Gastrojejunostomy tube status (H) 11/11/2020     Priority: Medium     Cognitive disorder 11/11/2020     Priority: Medium     Aggression 11/09/2020     Priority: Medium     Agitation 09/21/2020     Priority: Medium     Yeast infection 09/03/2020     Priority: Medium     G tube feedings (H) 09/03/2020     Priority: Medium     Dysphagia 08/04/2020     Priority: Medium     Traumatic brain injury (H) 06/25/2020     Priority: Medium     Cardiac arrest (H) 05/17/2020     Priority: Medium     Back injury, sequela 10/27/2017     Priority: Medium     Formatting of this note might be different from the original.  WC Case w/ [employer] ([email or web address])  CLM #[id number];  Lydia Machuca [phone]         Assessment:    Scot Bishop remains stable.  He continues to get periods of irritability during transitions with activities.  Scot Duarte's father would like to see him be on less medication and we will continue to work towards that.  There is concern for tardive dyskinesia movements and the olanzapine is being discontinued.  Amantadine is being decreased to 50 mg twice daily.  Hydroxyzine is added at 10 mg as needed 30 to 60 minutes before appointments for anxiety.  For his depression and anxiety he will continue paroxetine 30 mg daily.  Valproic acid at 5 mg twice daily is going to continue with plans to decrease the dose in the future.  Lorazepam is continued on a regular basis at 0.5 mg twice daily.  As Scot gets acclimated to his new lifestyle and setting,  he seems to be settling down better and establishing a routine.  It is difficult to know how he is feeling about all of this, given his language barriers..    Medication side effects and alternatives were reviewed. Health promotion activities recommended and reviewed today. All questions addressed. Education and counseling completed regarding risks and benefits of medications and psychotherapy options.    Treatment Plan:        1.  Decrease amantadine to 50 mg twice daily    2.  Discontinue olanzapine 2.5 mg at bedtime    3.  Add hydroxyzine 10 mg daily as needed 30 to 60 minutes before appointments for anxiety    4.  Continue paroxetine 30 mg daily    5.  Continue valproic acid 500 mg twice daily with plans to decrease in the future if tolerated    6.  Continue lorazepam 0.5 mg twice daily        Continue all other medications as reviewed per electronic medical record today.     Safety plan reviewed. To the Emergency Department as needed or call after hours crisis line at [phone] or [phone]. [place] Crisis Text [place]. Text [place] to 168126 or Suicide LifeLine Chat: suicidepreTrendyolline.org/chat/    To schedule individual or family therapy, call [facility] at [phone].    Schedule an appointment with me in February 16 or sooner as needed. Call [facility] at [phone] to schedule.    Follow up with primary care provider as planned or for acute medical concerns.    Call the psychiatric nurse line with medication questions or concerns at [phone].    [employer] may be used to communicate with your provider, but this is not intended to be used for emergencies.    Crisis Resources:    National Suicide Prevention Lifeline: 484.934.4658 (TTY: 600.533.2750). Call anytime for help.  (www.suicidepreventionlifeline.org)  National [place] on Mental Illness (www.melvin.org): 686.169.3907 or 314-503-3318.   Mental Health Association (www.mentalhealth.org): 512.333.3888 or  675-288-6366.  [place] Crisis Text Line: Text [place] to 858995  Suicide LifeLine Chat: suicidepreventionTowerJazzline.org/chat    Administrative Billing:   Time spent with patient was 30 minutes and greater than 50% of time or 20 minutes was spent in counseling and coordination of care regarding above diagnoses and treatment plan.    Patient Status:  Patient will continue to be seen for ongoing consultation and stabilization.    Signed:   LASHAWN Jeong-BC   Psychiatry

## 2021-12-31 NOTE — PATIENT INSTRUCTIONS
1.  Decrease amantadine to 50 mg twice daily    2.  Discontinue olanzapine 2.5 mg at bedtime    3.  Add hydroxyzine 10 mg daily as needed 30 to 60 minutes before appointments for anxiety    4.  Continue paroxetine 30 mg daily    5.  Continue valproic acid 500 mg twice daily with plans to decrease in the future if tolerated    6.  Continue lorazepam 0.5 mg twice daily        Continue all other medications as reviewed per electronic medical record today.     Safety plan reviewed. To the Emergency Department as needed or call after hours crisis line at [phone] or [phone]. [place] Crisis Text [place]. Text [place] to 582809 or Suicide LifeLine Chat: FileThis.org/chat/    To schedule individual or family therapy, call [facility] at [phone].    Schedule an appointment with me in February 16 or sooner as needed. Call [facility] at [phone] to schedule.    Follow up with primary care provider as planned or for acute medical concerns.    Call the psychiatric nurse line with medication questions or concerns at [phone].    [employer] may be used to communicate with your provider, but this is not intended to be used for emergencies.    Crisis Resources:    National Suicide Prevention Lifeline: 635.435.6002 (TTY: 458.270.5726). Call anytime for help.  (www.suicidepreventionlifeline.org)  National [place] on Mental Illness (www.melvin.org): 962.748.3008 or 925-581-5521.   Mental Health Association (www.mentalhealth.org): 245.212.6520 or 097-317-3303.  [place] Crisis [place]: Text [place] to 338181  Suicide LifeLine Chat: FileThis.org/chat

## 2022-01-17 ENCOUNTER — HOSPITAL ENCOUNTER (OUTPATIENT)
Facility: CLINIC | Age: 42
Setting detail: OBSERVATION
Discharge: NURSING, REHAB OR LONG-TERM CARE | End: 2022-01-19
Attending: EMERGENCY MEDICINE | Admitting: INTERNAL MEDICINE
Payer: COMMERCIAL

## 2022-01-17 ENCOUNTER — PATIENT OUTREACH (OUTPATIENT)
Dept: CARE COORDINATION | Facility: CLINIC | Age: 42
End: 2022-01-17
Payer: COMMERCIAL

## 2022-01-17 ENCOUNTER — NURSE TRIAGE (OUTPATIENT)
Dept: INTERNAL MEDICINE | Facility: CLINIC | Age: 42
End: 2022-01-17
Payer: COMMERCIAL

## 2022-01-17 ENCOUNTER — APPOINTMENT (OUTPATIENT)
Dept: CT IMAGING | Facility: CLINIC | Age: 42
End: 2022-01-17
Attending: EMERGENCY MEDICINE
Payer: COMMERCIAL

## 2022-01-17 DIAGNOSIS — I95.9 HYPOTENSION, UNSPECIFIED HYPOTENSION TYPE: ICD-10-CM

## 2022-01-17 LAB
ALBUMIN SERPL-MCNC: 3.8 G/DL (ref 3.4–5)
ALBUMIN UR-MCNC: 10 MG/DL
ALP SERPL-CCNC: 102 U/L (ref 40–150)
ALT SERPL W P-5'-P-CCNC: 31 U/L (ref 0–70)
ANION GAP SERPL CALCULATED.3IONS-SCNC: 4 MMOL/L (ref 3–14)
APPEARANCE UR: ABNORMAL
AST SERPL W P-5'-P-CCNC: 22 U/L (ref 0–45)
ATRIAL RATE - MUSE: 70 BPM
BASOPHILS # BLD AUTO: 0.1 10E3/UL (ref 0–0.2)
BASOPHILS NFR BLD AUTO: 1 %
BILIRUB SERPL-MCNC: 0.4 MG/DL (ref 0.2–1.3)
BILIRUB UR QL STRIP: NEGATIVE
BUN SERPL-MCNC: 17 MG/DL (ref 7–30)
CALCIUM SERPL-MCNC: 8.9 MG/DL (ref 8.5–10.1)
CHLORIDE BLD-SCNC: 104 MMOL/L (ref 94–109)
CO2 SERPL-SCNC: 27 MMOL/L (ref 20–32)
COLOR UR AUTO: YELLOW
CREAT SERPL-MCNC: 0.74 MG/DL (ref 0.66–1.25)
D DIMER PPP FEU-MCNC: <0.27 UG/ML FEU (ref 0–0.5)
DIASTOLIC BLOOD PRESSURE - MUSE: NORMAL MMHG
EOSINOPHIL # BLD AUTO: 0.2 10E3/UL (ref 0–0.7)
EOSINOPHIL NFR BLD AUTO: 2 %
ERYTHROCYTE [DISTWIDTH] IN BLOOD BY AUTOMATED COUNT: 13.6 % (ref 10–15)
GFR SERPL CREATININE-BSD FRML MDRD: >90 ML/MIN/1.73M2
GLUCOSE BLD-MCNC: 92 MG/DL (ref 70–99)
GLUCOSE UR STRIP-MCNC: NEGATIVE MG/DL
HCT VFR BLD AUTO: 40.4 % (ref 40–53)
HGB BLD-MCNC: 13.2 G/DL (ref 13.3–17.7)
HGB UR QL STRIP: NEGATIVE
HOLD SPECIMEN: NORMAL
HOLD SPECIMEN: NORMAL
IMM GRANULOCYTES # BLD: 0.1 10E3/UL
IMM GRANULOCYTES NFR BLD: 1 %
INTERPRETATION ECG - MUSE: NORMAL
KETONES UR STRIP-MCNC: NEGATIVE MG/DL
LACTATE SERPL-SCNC: 1.7 MMOL/L (ref 0.7–2)
LEUKOCYTE ESTERASE UR QL STRIP: NEGATIVE
LYMPHOCYTES # BLD AUTO: 2.1 10E3/UL (ref 0.8–5.3)
LYMPHOCYTES NFR BLD AUTO: 22 %
MAGNESIUM SERPL-MCNC: 2.1 MG/DL (ref 1.6–2.3)
MCH RBC QN AUTO: 30.4 PG (ref 26.5–33)
MCHC RBC AUTO-ENTMCNC: 32.7 G/DL (ref 31.5–36.5)
MCV RBC AUTO: 93 FL (ref 78–100)
MONOCYTES # BLD AUTO: 0.8 10E3/UL (ref 0–1.3)
MONOCYTES NFR BLD AUTO: 8 %
MUCOUS THREADS #/AREA URNS LPF: PRESENT /LPF
NEUTROPHILS # BLD AUTO: 6.3 10E3/UL (ref 1.6–8.3)
NEUTROPHILS NFR BLD AUTO: 66 %
NITRATE UR QL: NEGATIVE
NRBC # BLD AUTO: 0 10E3/UL
NRBC BLD AUTO-RTO: 0 /100
P AXIS - MUSE: 43 DEGREES
PH UR STRIP: 7 [PH] (ref 5–7)
PLATELET # BLD AUTO: 199 10E3/UL (ref 150–450)
POTASSIUM BLD-SCNC: 4.7 MMOL/L (ref 3.4–5.3)
PR INTERVAL - MUSE: 106 MS
PROT SERPL-MCNC: 8 G/DL (ref 6.8–8.8)
QRS DURATION - MUSE: 84 MS
QT - MUSE: 420 MS
QTC - MUSE: 453 MS
R AXIS - MUSE: 76 DEGREES
RBC # BLD AUTO: 4.34 10E6/UL (ref 4.4–5.9)
RBC URINE: 7 /HPF
SARS-COV-2 RNA RESP QL NAA+PROBE: NEGATIVE
SODIUM SERPL-SCNC: 135 MMOL/L (ref 133–144)
SP GR UR STRIP: 1.03 (ref 1–1.03)
SYSTOLIC BLOOD PRESSURE - MUSE: NORMAL MMHG
T AXIS - MUSE: 169 DEGREES
TROPONIN I SERPL HS-MCNC: 8 NG/L
TSH SERPL DL<=0.005 MIU/L-ACNC: 0.52 MU/L (ref 0.4–4)
UROBILINOGEN UR STRIP-MCNC: NORMAL MG/DL
VENTRICULAR RATE- MUSE: 70 BPM
WBC # BLD AUTO: 9.5 10E3/UL (ref 4–11)
WBC URINE: 3 /HPF

## 2022-01-17 PROCEDURE — 84484 ASSAY OF TROPONIN QUANT: CPT | Performed by: EMERGENCY MEDICINE

## 2022-01-17 PROCEDURE — 87181 SC STD AGAR DILUTION PER AGT: CPT | Performed by: EMERGENCY MEDICINE

## 2022-01-17 PROCEDURE — 120N000001 HC R&B MED SURG/OB

## 2022-01-17 PROCEDURE — 81001 URINALYSIS AUTO W/SCOPE: CPT | Performed by: EMERGENCY MEDICINE

## 2022-01-17 PROCEDURE — 250N000011 HC RX IP 250 OP 636: Performed by: EMERGENCY MEDICINE

## 2022-01-17 PROCEDURE — 80053 COMPREHEN METABOLIC PANEL: CPT | Performed by: EMERGENCY MEDICINE

## 2022-01-17 PROCEDURE — 87635 SARS-COV-2 COVID-19 AMP PRB: CPT | Performed by: EMERGENCY MEDICINE

## 2022-01-17 PROCEDURE — 85379 FIBRIN DEGRADATION QUANT: CPT | Performed by: EMERGENCY MEDICINE

## 2022-01-17 PROCEDURE — 87149 DNA/RNA DIRECT PROBE: CPT | Performed by: EMERGENCY MEDICINE

## 2022-01-17 PROCEDURE — 83735 ASSAY OF MAGNESIUM: CPT | Performed by: EMERGENCY MEDICINE

## 2022-01-17 PROCEDURE — 99285 EMERGENCY DEPT VISIT HI MDM: CPT | Mod: 25

## 2022-01-17 PROCEDURE — 250N000013 HC RX MED GY IP 250 OP 250 PS 637: Performed by: INTERNAL MEDICINE

## 2022-01-17 PROCEDURE — 84443 ASSAY THYROID STIM HORMONE: CPT | Performed by: EMERGENCY MEDICINE

## 2022-01-17 PROCEDURE — 99223 1ST HOSP IP/OBS HIGH 75: CPT | Mod: AI | Performed by: INTERNAL MEDICINE

## 2022-01-17 PROCEDURE — 74177 CT ABD & PELVIS W/CONTRAST: CPT

## 2022-01-17 PROCEDURE — 82040 ASSAY OF SERUM ALBUMIN: CPT | Performed by: EMERGENCY MEDICINE

## 2022-01-17 PROCEDURE — C9803 HOPD COVID-19 SPEC COLLECT: HCPCS

## 2022-01-17 PROCEDURE — 36415 COLL VENOUS BLD VENIPUNCTURE: CPT | Performed by: EMERGENCY MEDICINE

## 2022-01-17 PROCEDURE — 83605 ASSAY OF LACTIC ACID: CPT | Performed by: EMERGENCY MEDICINE

## 2022-01-17 PROCEDURE — 87040 BLOOD CULTURE FOR BACTERIA: CPT | Performed by: EMERGENCY MEDICINE

## 2022-01-17 PROCEDURE — 82533 TOTAL CORTISOL: CPT | Performed by: INTERNAL MEDICINE

## 2022-01-17 PROCEDURE — 250N000009 HC RX 250: Performed by: EMERGENCY MEDICINE

## 2022-01-17 PROCEDURE — 85025 COMPLETE CBC W/AUTO DIFF WBC: CPT | Performed by: EMERGENCY MEDICINE

## 2022-01-17 PROCEDURE — 93005 ELECTROCARDIOGRAM TRACING: CPT

## 2022-01-17 PROCEDURE — 87086 URINE CULTURE/COLONY COUNT: CPT | Performed by: EMERGENCY MEDICINE

## 2022-01-17 PROCEDURE — 258N000003 HC RX IP 258 OP 636: Performed by: EMERGENCY MEDICINE

## 2022-01-17 RX ORDER — ACETAMINOPHEN 325 MG/1
325 TABLET ORAL EVERY 6 HOURS PRN
Status: DISCONTINUED | OUTPATIENT
Start: 2022-01-17 | End: 2022-01-17

## 2022-01-17 RX ORDER — ACETAMINOPHEN 325 MG/1
325 TABLET ORAL EVERY 6 HOURS PRN
Status: DISCONTINUED | OUTPATIENT
Start: 2022-01-17 | End: 2022-01-19 | Stop reason: HOSPADM

## 2022-01-17 RX ORDER — HYDROXYZINE HYDROCHLORIDE 10 MG/1
10 TABLET, FILM COATED ORAL DAILY PRN
Status: DISCONTINUED | OUTPATIENT
Start: 2022-01-17 | End: 2022-01-19 | Stop reason: HOSPADM

## 2022-01-17 RX ORDER — MIDODRINE HYDROCHLORIDE 2.5 MG/1
2.5 TABLET ORAL
Status: DISCONTINUED | OUTPATIENT
Start: 2022-01-17 | End: 2022-01-19 | Stop reason: HOSPADM

## 2022-01-17 RX ORDER — LORAZEPAM 0.5 MG/1
0.5 TABLET ORAL
Status: DISCONTINUED | OUTPATIENT
Start: 2022-01-17 | End: 2022-01-19 | Stop reason: HOSPADM

## 2022-01-17 RX ORDER — IBUPROFEN 200 MG
CAPSULE ORAL 2 TIMES DAILY PRN
COMMUNITY
End: 2022-07-15

## 2022-01-17 RX ORDER — ASPIRIN 81 MG/1
81 TABLET, CHEWABLE ORAL DAILY
Status: DISCONTINUED | OUTPATIENT
Start: 2022-01-18 | End: 2022-01-17

## 2022-01-17 RX ORDER — ATORVASTATIN CALCIUM 20 MG/1
40 TABLET, FILM COATED ORAL EVERY EVENING
Status: DISCONTINUED | OUTPATIENT
Start: 2022-01-17 | End: 2022-01-19 | Stop reason: HOSPADM

## 2022-01-17 RX ORDER — ONDANSETRON 4 MG/1
4 TABLET, ORALLY DISINTEGRATING ORAL EVERY 6 HOURS PRN
Status: DISCONTINUED | OUTPATIENT
Start: 2022-01-17 | End: 2022-01-19 | Stop reason: HOSPADM

## 2022-01-17 RX ORDER — DOCUSATE SODIUM 100 MG/1
100 CAPSULE, LIQUID FILLED ORAL 2 TIMES DAILY PRN
Status: DISCONTINUED | OUTPATIENT
Start: 2022-01-17 | End: 2022-01-19 | Stop reason: HOSPADM

## 2022-01-17 RX ORDER — PROCHLORPERAZINE MALEATE 10 MG
10 TABLET ORAL EVERY 6 HOURS PRN
Status: DISCONTINUED | OUTPATIENT
Start: 2022-01-17 | End: 2022-01-19 | Stop reason: HOSPADM

## 2022-01-17 RX ORDER — PAROXETINE 30 MG/1
30 TABLET, FILM COATED ORAL EVERY MORNING
Status: DISCONTINUED | OUTPATIENT
Start: 2022-01-18 | End: 2022-01-19 | Stop reason: HOSPADM

## 2022-01-17 RX ORDER — ASPIRIN 81 MG/1
81 TABLET, CHEWABLE ORAL DAILY
Status: DISCONTINUED | OUTPATIENT
Start: 2022-01-18 | End: 2022-01-19 | Stop reason: HOSPADM

## 2022-01-17 RX ORDER — IOPAMIDOL 755 MG/ML
80 INJECTION, SOLUTION INTRAVASCULAR ONCE
Status: COMPLETED | OUTPATIENT
Start: 2022-01-17 | End: 2022-01-17

## 2022-01-17 RX ORDER — ONDANSETRON 2 MG/ML
4 INJECTION INTRAMUSCULAR; INTRAVENOUS EVERY 6 HOURS PRN
Status: DISCONTINUED | OUTPATIENT
Start: 2022-01-17 | End: 2022-01-19 | Stop reason: HOSPADM

## 2022-01-17 RX ORDER — MIDODRINE HYDROCHLORIDE 2.5 MG/1
2.5 TABLET ORAL
Status: DISCONTINUED | OUTPATIENT
Start: 2022-01-17 | End: 2022-01-17

## 2022-01-17 RX ORDER — PROCHLORPERAZINE 25 MG
25 SUPPOSITORY, RECTAL RECTAL EVERY 12 HOURS PRN
Status: DISCONTINUED | OUTPATIENT
Start: 2022-01-17 | End: 2022-01-19 | Stop reason: HOSPADM

## 2022-01-17 RX ORDER — LIDOCAINE 40 MG/G
CREAM TOPICAL
Status: DISCONTINUED | OUTPATIENT
Start: 2022-01-17 | End: 2022-01-19 | Stop reason: HOSPADM

## 2022-01-17 RX ADMIN — TICAGRELOR 90 MG: 90 TABLET ORAL at 22:04

## 2022-01-17 RX ADMIN — ATORVASTATIN CALCIUM 40 MG: 20 TABLET, FILM COATED ORAL at 21:07

## 2022-01-17 RX ADMIN — LORAZEPAM 0.5 MG: 0.5 TABLET ORAL at 21:07

## 2022-01-17 RX ADMIN — SODIUM CHLORIDE 1000 ML: 9 INJECTION, SOLUTION INTRAVENOUS at 14:48

## 2022-01-17 RX ADMIN — IOPAMIDOL 80 ML: 755 INJECTION, SOLUTION INTRAVENOUS at 15:57

## 2022-01-17 RX ADMIN — CARBIDOPA AND LEVODOPA 2.5 MG: 50; 200 TABLET, EXTENDED RELEASE ORAL at 22:04

## 2022-01-17 RX ADMIN — SODIUM CHLORIDE 80 ML: 900 INJECTION INTRAVENOUS at 15:57

## 2022-01-17 RX ADMIN — VALPROIC ACID 500 MG: 250 SOLUTION ORAL at 22:05

## 2022-01-17 ASSESSMENT — ACTIVITIES OF DAILY LIVING (ADL)
ADLS_ACUITY_SCORE: 10

## 2022-01-17 NOTE — PROGRESS NOTES
Clinic Care Coordination Contact    Situation: Patient chart reviewed by care coordinator.    Background:   RNCC is due for monthly outreach to patient for provider recommendations and goal progression.    Assessment:   Per chart review, patient is currently in the [facility] via ambulance from [facility] for hypotensive episode and unresponsiveness.    Plan/Recommendations:   -Patient will contact the care team with questions, concerns, support needs   -Patient will use the clinic as a resource and understands (s)he can contact the [facility] with 24/7 after hours services available  -Care Coordinator will remain available as needed  -RNCC will follow up in 3-5 business days for follow up appointments/recommendations and goal progression    Candace Perez RN, BSN, PHN  Primary Care / Care Coordinator   [facility]  E-mail [email or web address]   [phone]

## 2022-01-17 NOTE — TELEPHONE ENCOUNTER
Spoke to José Miguel,  at group [facility]. José Miguel is on phone with Emergency Department and is taking patient to ED now.

## 2022-01-17 NOTE — ED TRIAGE NOTES
Pt presents by [facility] from [facility] with hypotension. Pt has hx of anoxic brain injury and is nonverbal and unable to interact with cares. Pt attempts to keep both arms up above his head and is rigid.  Father reports that last week he went to physical therapy and they were unable to do therapy due to hypotension. Today staff was checking pt's blood pressure and it was ranging  systolic. Staff reports pt is at his baseline in mentation. Pt has feeding tube and condom catheter in place. Staff did not report any new signs of illness.

## 2022-01-17 NOTE — PHARMACY-ADMISSION MEDICATION HISTORY
Pharmacy Medication History  Admission medication history interview status for the 1/17/2022  admission is complete. See EPIC admission navigator for prior to admission medications     Location of Interview: Outside patient room but on unit  Medication history sources: [facility]    In the past week, patient estimated taking medication this percent of the time: greater than 90%    Medication reconciliation completed by provider prior to medication history? No    Time spent in this activity: 20 minutes    Prior to Admission medications    Medication Sig Last Dose Taking? Auth Provider   acetaminophen (TYLENOL) 325 MG tablet Take 1 tablet (325 mg) by mouth every 6 hours as needed for mild pain or fever as needed Yes Cachorro Morales MD   amantadine (SYMMETREL) 50 MG/5ML syrup 5 mLs (50 mg) by Per G Tube route every 12 hours VIA PEG-Tube 1/17/2022 at am Yes Elsa Rahman NP   ASPIRIN LOW DOSE 81 MG chewable tablet TAKE ONE TABLET PER G TUBE EVERY DAY 1/17/2022 at am Yes Bibi Marcus MD   atorvastatin (LIPITOR) 40 MG tablet TAKE ONE TABLET PER G TUBE EVERY NIGHT AT BEDTIME 1/16/2022 at pm Yes Bibi Marcus MD   docusate sodium (COLACE) 100 MG capsule Take 1 capsule (100 mg) by mouth 2 times daily as needed for constipation as needed Yes Cachorro Morales MD   Emollient (AQUAPHOR ADVANCED THERAPY) OINT Apply topically 2 times daily As needed. as needed Yes Cachorro Morales MD   hydrOXYzine (ATARAX) 10 MG tablet Take 1 tablet (10 mg) by mouth daily as needed for anxiety (30-60 minutes before appointments) as needed Yes Elsa Rahman NP   LORazepam (ATIVAN) 0.5 MG tablet Take 1 tablet (0.5 mg) by mouth 2 times daily 1/17/2022 at am Yes Elsa Rahman NP   metoprolol tartrate (LOPRESSOR) 25 MG tablet TAKE 1/2 TABLET BY MOUTH TWICE A DAY 1/17/2022 at am Yes Bibi Marcus MD   Neomycin-Bacitracin-Polymyxin (TRIPLE ANTIBIOTIC) 3.5-400-5000 OINT ointment  Externally apply topically 2 times daily as needed as needed Yes Unknown, Entered By History   nystatin (MYCOSTATIN) 387168 UNIT/GM external cream Apply topically 2 times daily As needed as needed Yes Cachorro Morales MD   PARoxetine (PAXIL) 30 MG tablet Take 1 tablet (30 mg) by mouth every morning 1/17/2022 at am Yes Elsa Rahman NP   ticagrelor (BRILINTA) 90 MG tablet Take 1 tablet (90 mg) by mouth 2 times daily 1/17/2022 at am Yes Bibi Marcus MD   Valproate Sodium (VALPROIC ACID) 250 MG/5ML Take 10 mLs (500 mg) by mouth 2 times daily 1/17/2022 at am Yes Elsa Rahman NP       The information provided in this note is only as accurate as the sources available at the time of update(s)

## 2022-01-17 NOTE — TELEPHONE ENCOUNTER
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  How: automatic home BP monitor  Take BP at PT: 'low' couldn't participate in PT last week, was asked by PT to take BP before coming into appt today.   On BP medications.  Non-verbal  \"seems out of it tired, pale\"    Advised José Miguel to call 911, agreeable will call EMS.    Luigi Lyn RN  [facility]    Reason for Disposition    Systolic BP < 90 and feeling dizzy, lightheaded, or weak    Protocols used: LOW BLOOD PRESSURE-A-OH      "

## 2022-01-17 NOTE — ED NOTES
[facility]  ED Nurse Handoff Report    ED Chief complaint: Hypotension      ED Diagnosis:   Final diagnoses:   Hypotension, unspecified hypotension type       Code Status: Full Code    Allergies: No Known Allergies    Patient Story: History PMH of ACS, cardiac arrest, TBI, and HTN who presents with hypotension  Focused Assessment:  Pt lives at [facility] (nonmedical), pt nonverbal - staff and pt dad report pt is at baseline for behavior and no new symptoms present for illness. No recent illness. Pt BP noted to be low last week at PT and again today. Pt sent to ED for eval of hypotension. Dad is guardian and remains at bedside. G tube for all nutrition and medication adminstration. Pt prefers to keep arms above head.     Treatments and/or interventions provided:   Labs Ordered and Resulted from Time of ED Arrival to Time of ED Departure   ROUTINE UA WITH MICROSCOPIC REFLEX TO CULTURE - Abnormal       Result Value    Color Urine Yellow      Appearance Urine Cloudy (*)     Glucose Urine Negative      Bilirubin Urine Negative      Ketones Urine Negative      Specific Gravity Urine 1.026      Blood Urine Negative      pH Urine 7.0      Protein Albumin Urine 10  (*)     Urobilinogen Urine Normal      Nitrite Urine Negative      Leukocyte Esterase Urine Negative      Mucus Urine Present (*)     RBC Urine 7 (*)     WBC Urine 3     CBC WITH PLATELETS AND DIFFERENTIAL - Abnormal    WBC Count 9.5      RBC Count 4.34 (*)     Hemoglobin 13.2 (*)     Hematocrit 40.4      MCV 93      MCH 30.4      MCHC 32.7      RDW 13.6      Platelet Count 199      % Neutrophils 66      % Lymphocytes 22      % Monocytes 8      % Eosinophils 2      % Basophils 1      % Immature Granulocytes 1      NRBCs per 100 WBC 0      Absolute Neutrophils 6.3      Absolute Lymphocytes 2.1      Absolute Monocytes 0.8      Absolute Eosinophils 0.2      Absolute Basophils 0.1      Absolute Immature Granulocytes 0.1      Absolute NRBCs 0.0      COMPREHENSIVE METABOLIC PANEL - Normal    Sodium 135      Potassium 4.7      Chloride 104      Carbon Dioxide (CO2) 27      Anion Gap 4      Urea Nitrogen 17      Creatinine 0.74      Calcium 8.9      Glucose 92      Alkaline Phosphatase 102      AST 22      ALT 31      Protein Total 8.0      Albumin 3.8      Bilirubin Total 0.4      GFR Estimate >90     D DIMER QUANTITATIVE - Normal    D-Dimer Quantitative <0.27     TROPONIN I - Normal    Troponin I High Sensitivity 8     TSH WITH FREE T4 REFLEX - Normal    TSH 0.52     LACTIC ACID WHOLE BLOOD - Normal    Lactic Acid 1.7     MAGNESIUM - Normal    Magnesium 2.1     COVID-19 VIRUS (CORONAVIRUS) BY PCR - Normal    SARS CoV2 PCR Negative     CORTISOL   URINE CULTURE   BLOOD CULTURE   BLOOD CULTURE     Medications   0.9% sodium chloride BOLUS (1,000 mLs Intravenous New Bag 1/17/22 1448)   iopamidol (ISOVUE-370) solution 80 mL (80 mLs Intravenous Given 1/17/22 1557)   sodium chloride 0.9 % bag 500mL for CT scan flush use (80 mLs Intravenous Given 1/17/22 1557)     CT Aortic Survey w Contrast   Final Result   IMPRESSION: No acute abnormality in the chest, abdomen, or pelvis. No   evidence for aortic aneurysm or dissection.      RACHELE STEPHEN MD            SYSTEM ID:  [id number]          Patient's response to treatments and/or interventions: tolerated    To be done/followed up on inpatient unit:  see orders    Does this patient have any cognitive concerns?: Hx Head Trauma    Activity level - Baseline/Home:  Wheelchair & jessica  Activity Level - Current:   Total Care    Patient's Preferred language: English   Needed?: No    Isolation: Contact    Infection: Not Applicable  MRSA  Patient tested for COVID 19 prior to admission: YES  Bariatric?: No    Vital Signs:   Vitals:    01/17/22 1500 01/17/22 1530 01/17/22 1630 01/17/22 1700   BP: 90/57 (!) 87/52 (!) 88/53 (!) 88/56   Pulse: 65 71 66 66   Resp: 19 27 16    Temp:       TempSrc:       SpO2: 98% 97% 97%     Weight:           Cardiac Rhythm:     Was the PSS-3 completed:   Yes  What interventions are required if any?               Family Comments: Dad is at bedside  OBS brochure/video discussed/provided to patient/family: N/A               For the majority of the shift this patient's behavior was Green.       ED NURSE PHONE NUMBER: [phone]

## 2022-01-17 NOTE — ED NOTES
Bed: ED04  Expected date:   Expected time:   Means of arrival:   Comments:  Rene - 511 - 41 M hypotension eta 1407

## 2022-01-17 NOTE — ED PROVIDER NOTES
History     Chief Complaint:  Hypotension    HPI   Scot Bishop is a 41 year old male with PMH of ACS, cardiac arrest, TBI, and HTN who presents with hypotension.  Patient was sent home from physical therapy last week due to low BP <90 systolic.  In the last week, patient has persistent to have low blood pressures 75-90 systolic with no fever or new symptoms.  He has been taking his medications regularly per staff which included metoprolol.  Patient has not had any neurologic changes, vomiting, or diarrhea.  No reported fevers or constitutional symptoms.  Patient is non-verbal so unable to contribute to history.  He is currently at a non-medical group home.  Fully vaccinated for COVID-19 with booster.    ROS:  Review of Systems   Unable to perform ROS: Patient nonverbal     Allergies:  No known drug allergies    Medications:    Amantadine  Aspirin  Atorvastatin  Benztropine  Bacitracin  Docusate sodium  Emollient  Hydroxyzine  Lorazepam  Metoprolol tartrate  Nystatin  Olanzapine  Paroxetine  Ticagrelor  Valoproate sodium     Past Medical History:    Acute respiratory failure with hypoxia  LOWELL  Cardiac arrest  CAD  Cognitive disorder  Depressive disorder  Dysphagia  Gastrojejunostomy tube status  Hypertension  Hypoxic ischemic encephalopathy  NSTEMI  TBI  Thrush, oral  Nicotine dependence  Advance care planning  Thrombocytopenia  Back injury  Aggressive behavior  Impetigo  Primary lymphadenitis  Nicotine dependence    Past Surgical History:    Appendectomy  Cardiac catheterization  CV coronary angiogram  CV ECMO (and removal)  CV intra aortic balloon, insertion  CV PCI stent drug eluting  G tube replacement, x3  Gastrostomy tube change x4    Family History:    family history includes Cancer in his maternal grandfather; Diabetes in his maternal aunt; Diabetes Type 1 in his maternal aunt; Lung Cancer in his maternal grandfather; Other - See Comments in his father; Parkinsonism in his father.    Social History:    reports that he quit smoking about 20 months ago. He started smoking about 26 years ago. He smoked 0.00 packs per day for 0.00 years. He has never used smokeless tobacco. He reports previous alcohol use. He reports previous drug use.  PCP: Cachorro Morales     Physical Exam     Patient Vitals for the past 24 hrs:   BP Temp Temp src Pulse Resp SpO2 Weight   01/17/22 1700 (!) 88/56 -- -- 66 -- -- --   01/17/22 1630 (!) 88/53 -- -- 66 16 97 % --   01/17/22 1530 (!) 87/52 -- -- 71 27 97 % --   01/17/22 1500 90/57 -- -- 65 19 98 % --   01/17/22 1455 (!) 83/58 -- -- 61 21 98 % --   01/17/22 1450 -- -- -- 61 12 98 % --   01/17/22 1445 (!) 82/40 -- -- 62 12 -- --   01/17/22 1440 -- -- -- 68 18 -- --   01/17/22 1436 -- 99.1  F (37.3  C) Rectal -- -- -- 81.6 kg (180 lb)   01/17/22 1430 -- -- -- 70 18 97 % --   01/17/22 1425 92/62 -- -- 70 -- 99 % --     Physical Exam  General: Alert, appears well-developed and well-nourished. Non-verbal.    In No acute distress  HEENT:  Head:  Atraumatic  Ears:  External ears are normal  Mouth/Throat:  Oropharynx is without erythema or exudate and mucous membranes are dry.   Eyes:   Conjunctivae normal and EOM are normal. No scleral icterus.  CV:  Normal rate, regular rhythm, normal heart sounds and radial pulses are 2+ and symmetric.  No murmur.  Resp:  Breath sounds are clear bilaterally    Non-labored, no retractions or accessory muscle use  GI:  Abdomen is soft, no distension, no tenderness. No rebound or guarding.  No CVA tenderness bilaterally  MS:  Normal range of motion. No edema.    Back atraumatic.    No midline cervical, thoracic, or lumbar tenderness  Skin:  Warm and dry.  No rash or lesions noted.  Neuro: Alert.  Baseline neurologic functioning.    Psych:  Unable to assess due to nonverbal status.     Emergency Department Course     ECG:  ECG taken at 1459, ECG read at 1512  Sinus rhythm with short AR.  Anteroseptal infarct, age undetermined.  T wave abnormality, consider lateral  ischemia.  Abnormal ECG.  T wave abnormality is new as compared to prior EKG dated 6/25/20   Rate 70 bpm. CT interval 106 ms. QRS duration 84 ms. QT/QTc 420/453 ms. P-R-T axis 43 76 169.     Imaging:  CT Aortic Survey w Contrast   Final Result   IMPRESSION: No acute abnormality in the chest, abdomen, or pelvis. No   evidence for aortic aneurysm or dissection.      RACHELE STEPHEN MD            SYSTEM ID:  [id number]         Report per radiology    Laboratory:  Labs Ordered and Resulted from Time of ED Arrival to Time of ED Departure   ROUTINE UA WITH MICROSCOPIC REFLEX TO CULTURE - Abnormal       Result Value    Color Urine Yellow      Appearance Urine Cloudy (*)     Glucose Urine Negative      Bilirubin Urine Negative      Ketones Urine Negative      Specific Gravity Urine 1.026      Blood Urine Negative      pH Urine 7.0      Protein Albumin Urine 10  (*)     Urobilinogen Urine Normal      Nitrite Urine Negative      Leukocyte Esterase Urine Negative      Mucus Urine Present (*)     RBC Urine 7 (*)     WBC Urine 3     CBC WITH PLATELETS AND DIFFERENTIAL - Abnormal    WBC Count 9.5      RBC Count 4.34 (*)     Hemoglobin 13.2 (*)     Hematocrit 40.4      MCV 93      MCH 30.4      MCHC 32.7      RDW 13.6      Platelet Count 199      % Neutrophils 66      % Lymphocytes 22      % Monocytes 8      % Eosinophils 2      % Basophils 1      % Immature Granulocytes 1      NRBCs per 100 WBC 0      Absolute Neutrophils 6.3      Absolute Lymphocytes 2.1      Absolute Monocytes 0.8      Absolute Eosinophils 0.2      Absolute Basophils 0.1      Absolute Immature Granulocytes 0.1      Absolute NRBCs 0.0     COMPREHENSIVE METABOLIC PANEL - Normal    Sodium 135      Potassium 4.7      Chloride 104      Carbon Dioxide (CO2) 27      Anion Gap 4      Urea Nitrogen 17      Creatinine 0.74      Calcium 8.9      Glucose 92      Alkaline Phosphatase 102      AST 22      ALT 31      Protein Total 8.0      Albumin 3.8      Bilirubin Total 0.4       GFR Estimate >90     D DIMER QUANTITATIVE - Normal    D-Dimer Quantitative <0.27     TROPONIN I - Normal    Troponin I High Sensitivity 8     TSH WITH FREE T4 REFLEX - Normal    TSH 0.52     LACTIC ACID WHOLE BLOOD - Normal    Lactic Acid 1.7     MAGNESIUM - Normal    Magnesium 2.1     COVID-19 VIRUS (CORONAVIRUS) BY PCR - Normal    SARS CoV2 PCR Negative     CORTISOL   URINE CULTURE   BLOOD CULTURE   BLOOD CULTURE      Procedures     Emergency Department Course:    Reviewed:  I reviewed nursing notes, vitals, past medical history and Care Everywhere.    Consults:   None    Interventions:  Medications   0.9% sodium chloride BOLUS (1,000 mLs Intravenous New Bag 1/17/22 1448)   iopamidol (ISOVUE-370) solution 80 mL (80 mLs Intravenous Given 1/17/22 1557)   sodium chloride 0.9 % bag 500mL for CT scan flush use (80 mLs Intravenous Given 1/17/22 1557)      Disposition:  The patient was admitted to the hospital under the care of Dr. JANNETTE Kamara.     Impression & Plan    CMS Diagnoses: None    Covid-19  Scot Bishop was evaluated during a global COVID-19 pandemic, which necessitated consideration that the patient might be at risk for infection with the SARS-CoV-2 virus that causes COVID-19.   Applicable protocols for evaluation were followed during the patient's care.   COVID-19 was considered as part of the patient's evaluation. The plan for testing is:  a test was obtained during this visit.    Medical Decision Making:  Patient is a 41-year-old male with a complex past medical history pertinent for cardiac arrest, status post anoxic brain injury and nonverbal status who presents with hypotension.  Unclear the etiology of the hypotension but may be related to cortisol deficiency versus medication related with metoprolol use, versus autonomic dysreflexia versus sepsis versus ACS versus aortic rupture or dissection.  Patient had a broad work-up performed in the emergency department.  He did not have significant  improvement in his blood pressure after fluid bolus.  Blood work grossly unremarkable, no signs of infection.  Aortic survey CT scan was unremarkable with no signs of aortic dissection nor other vascular catastrophe within the chest abdomen or pelvis.  No signs of septic etiology.  EKG showed nonspecific T wave inversions to the lateral leads which appeared to be new in comparison with historic EKGs.  Thankfully his troponin is at 8.  Lower concern for ACS at this time.  D-dimer unremarkable and lower concern for pulmonary embolism.  No evidence of renal dysfunction, anemia, or electrolyte abnormality.  Unclear the exact etiology of the hypotension at this time and so will be admitted to the hospitalist service for further management and evaluation.  I spoke with Dr. Lopez of the hospitalist service who agreed to admission.    Diagnosis:    ICD-10-CM    1. Hypotension, unspecified hypotension type  I95.9         Discharge Medications:  New Prescriptions    No medications on file        1/17/2022   Arthur Jean MD     I, Lesli Dee, am serving as a scribe at 1546 on 1/17/22 to document services personally performed by Arthur Jean MD based on my observations and the provider's statements to me.     Arthur Jean MD  01/17/22 [id number]

## 2022-01-17 NOTE — TELEPHONE ENCOUNTER
José Miguel called back.  They did not call EMS  Patient takes metoprolol 25 mg daily.  Was asking for medication changes due to low BP.  Agreed with below documentation to call EMS. BP is fluctuating, dropping low then will 'bounce up to normal'.  Patient still looks pale.  He is non verbal  Dianne Hassan RN

## 2022-01-18 ENCOUNTER — APPOINTMENT (OUTPATIENT)
Dept: CARDIOLOGY | Facility: CLINIC | Age: 42
End: 2022-01-18
Attending: INTERNAL MEDICINE
Payer: COMMERCIAL

## 2022-01-18 LAB
CORTIS SERPL-MCNC: 11.1 UG/DL (ref 4–22)
LVEF ECHO: NORMAL

## 2022-01-18 PROCEDURE — 93306 TTE W/DOPPLER COMPLETE: CPT | Mod: 26 | Performed by: INTERNAL MEDICINE

## 2022-01-18 PROCEDURE — 250N000013 HC RX MED GY IP 250 OP 250 PS 637: Performed by: INTERNAL MEDICINE

## 2022-01-18 PROCEDURE — G0378 HOSPITAL OBSERVATION PER HR: HCPCS

## 2022-01-18 PROCEDURE — 999N000111 HC STATISTIC OT IP EVAL DEFER

## 2022-01-18 PROCEDURE — 255N000002 HC RX 255 OP 636: Performed by: INTERNAL MEDICINE

## 2022-01-18 PROCEDURE — 999N000208 ECHOCARDIOGRAM COMPLETE

## 2022-01-18 PROCEDURE — 99226 PR SUBSEQUENT OBSERVATION CARE,LEVEL III: CPT | Performed by: INTERNAL MEDICINE

## 2022-01-18 RX ADMIN — ASPIRIN 81 MG CHEWABLE TABLET 81 MG: 81 TABLET CHEWABLE at 09:03

## 2022-01-18 RX ADMIN — LORAZEPAM 0.5 MG: 0.5 TABLET ORAL at 15:15

## 2022-01-18 RX ADMIN — PAROXETINE HYDROCHLORIDE 30 MG: 30 TABLET, FILM COATED ORAL at 09:04

## 2022-01-18 RX ADMIN — VALPROIC ACID 500 MG: 250 SOLUTION ORAL at 09:05

## 2022-01-18 RX ADMIN — CARBIDOPA AND LEVODOPA 2.5 MG: 50; 200 TABLET, EXTENDED RELEASE ORAL at 17:40

## 2022-01-18 RX ADMIN — TICAGRELOR 90 MG: 90 TABLET ORAL at 09:04

## 2022-01-18 RX ADMIN — TICAGRELOR 90 MG: 90 TABLET ORAL at 20:56

## 2022-01-18 RX ADMIN — CARBIDOPA AND LEVODOPA 2.5 MG: 50; 200 TABLET, EXTENDED RELEASE ORAL at 11:19

## 2022-01-18 RX ADMIN — CARBIDOPA AND LEVODOPA 2.5 MG: 50; 200 TABLET, EXTENDED RELEASE ORAL at 09:04

## 2022-01-18 RX ADMIN — LORAZEPAM 0.5 MG: 0.5 TABLET ORAL at 09:03

## 2022-01-18 RX ADMIN — HUMAN ALBUMIN MICROSPHERES AND PERFLUTREN 9 ML: 10; .22 INJECTION, SOLUTION INTRAVENOUS at 14:08

## 2022-01-18 RX ADMIN — ATORVASTATIN CALCIUM 40 MG: 20 TABLET, FILM COATED ORAL at 20:56

## 2022-01-18 RX ADMIN — VALPROIC ACID 500 MG: 250 SOLUTION ORAL at 20:56

## 2022-01-18 ASSESSMENT — ACTIVITIES OF DAILY LIVING (ADL)
ADLS_ACUITY_SCORE: 22
ADLS_ACUITY_SCORE: 18
ADLS_ACUITY_SCORE: 22
ADLS_ACUITY_SCORE: 18
ADLS_ACUITY_SCORE: 10
ADLS_ACUITY_SCORE: 18
ADLS_ACUITY_SCORE: 10

## 2022-01-18 NOTE — UTILIZATION REVIEW
"  Admission Status; Secondary Review Determination         Under the authority of the Utilization Management Committee, the utilization review process indicated a secondary review on the above patient.  The review outcome is based on review of the medical records, discussions with staff, and applying clinical experience noted on the date of the review.          (x) Observation Status Appropriate - This patient does not meet hospital inpatient criteria and is placed in observation status. If this patient's primary payer is Medicare and was admitted as an inpatient, Condition Code 44 should be used and patient status changed to \"observation\".     RATIONALE FOR DETERMINATION   41-year-old male with a history of traumatic brain injury, ventricular fibrillation cardiac arrest with anoxic brain injury, ACS requiring intervention and PCI to proximal and distal LAD in 2020, cognitive impairment, depression, dysphagia status post GJ tube placement now eating orally, history of hypertension. The patient is nonverbal and resident of [facility], was sent to the ED today because of low blood pressure.   They checked the blood pressure in [facility] today, which they usually do not do the vitals of the residents, and found to be low blood pressure, and systolic blood pressure was in 80s, so sent to the ED for evaluation. The patient has no change in his status. He remains awake and alert, moving upper extremities, and remained comfortable. There is no history of any passing-out, fall, trauma or complaint of any pain at the [facility]. He has been eating well by mouth now, only uses GJ tube for the fluids and flushes. He has no change in his medication lately. In the ED, he had extensive workup done.  His CBC, CMP, LFTs, lactic acid, TSH, hemoglobin, UA all came back negative.  He had a CT aortic survey which was unremarkable as well.   Patient has known history of low blood pressure that is usually asymptomatic, visit in [place] " health record that was outpatient blood pressure systolic was 79.  The severity of illness, intensity of service provided, expected LOS and risk for adverse outcome make the care appropriate for further observation; however, doesn't meet criteria for hospital inpatient admission. Dr Kamara notified of this determination.    This document was produced using voice recognition software.      The information on this document is developed by the utilization review team in order for the business office to ensure compliance.  This only denotes the appropriateness of proper admission status and does not reflect the quality of care rendered.         The definitions of Inpatient Status and Observation Status used in making the determination above are those provided in the CMS Coverage Manual, Chapter 1 and Chapter 6, section 70.4.      Sincerely,     ABDOULAYE MCGOVERN MD    System Medical Director  Utilization Management  [facility].

## 2022-01-18 NOTE — H&P
Admitted: 01/17/2022    HISTORY OF PRESENT ILLNESS:  This is a 41-year-old male with a history of traumatic brain injury, ventricular fibrillation cardiac arrest with anoxic brain injury, ACS requiring intervention and PCI to proximal and distal LAD in 2020, cognitive impairment, depression, dysphagia status post GJ tube placement now eating orally, history of hypertension.  The patient is nonverbal and resident of [facility], was sent to the ED today because of low blood pressure.    Most of the history is obtained from the patient's father, who is at the bedside, and the ER physician.  The patient is nonverbal and unable to provide any history.  Last week the physical therapist sent him back because of low blood pressure and did not do the physical therapy.  They recommended to check the blood pressure before sending the patient back for physical therapy.  They checked the blood pressure in [facility] today, which they usually do not do the vitals of the residents, and found to be low blood pressure, and systolic blood pressure was in 80s, so sent to the ED for evaluation.  The patient has no change in his status.  He remains awake and alert, moving upper extremities, and remained comfortable.  There is no history of any passing-out, fall, trauma or complaint of any pain at the [facility].  He has been eating well by mouth now, only uses GJ tube for the fluids and flushes.  He has no change in his medication lately.  Rest of the review of systems unobtainable at this time.    In the ED, he had extensive workup done.  His CBC, CMP, LFTs, lactic acid, TSH, hemoglobin, UA all came back negative.  He had a CT aortic survey which was unremarkable as well.  So, the Hospitalist Service was consulted to admit the patient.  He was given 1 liter of IV fluid bolus in the ED without change in his blood pressure.    ASSESSMENT AND PLAN:    1.  Hypotension:  This is a 41-year-old male with history of traumatic brain injury,  ventricular fibrillation cardiac arrest, hypoxic brain injury, status post percutaneous coronary intervention with drug-eluting stent to proximal and mid left anterior descending.  He is on metoprolol b.i.d., now found to have low blood pressure without any symptoms.  I think this is pretty much his baseline blood pressure, as most of the workup is negative, including the lactic acid level.  The patient is awake and alert, moving all his extremities.  He is not in any distress.  There is no sign of any infection or sepsis.  There is no sign of any hemorrhage or hemorrhagic shock.  Hemoglobin is stable.  CT is negative.  Looking into his records, most of his last couple of years of visits were virtual visits, so no vitals were available, but in 11/2021 when he saw his PCP, his blood pressure was on the lower side as well, 96/60.  At the place he lives, they do not check vitals of the residents regularly, as he is in a group home.  He is asymptomatic at this time.  The only thing I will add is to check his random cortisol now and cortisol in the morning, put him on low-dose midodrine 2.5 mg b.i.d., hold his metoprolol for now, maybe cut down the dose, and do echocardiogram.  If his blood pressure remains stable, possibly this is his baseline blood pressure without affecting his perfusion at this time.  However, I will hold any further IV fluids at this time given his cardiomyopathy.  2.  History of ischemic cardiomyopathy with ejection fraction of 30-35% on 05/19/2021:  Followed by Cardiology at [facility].  Recommend possibility of AICD as well.  We will keep him on telemetry, keep him on metoprolol.  His blood pressure is improved.  Will do the echocardiogram, and we will see how he does.  3.  Traumatic brain injury:  He is on amantadine, which can cause hypotension and orthostatic hypotension.  I will hold amantadine for now.  4.  History of agitation and aggression:  He is on valproic acid, and followed by  Psychiatry as outpatient.  Continue with that.  Continue with Valium as needed.  I think we need to hold the Atarax as well.  5.  Ischemic cardiomyopathy as mentioned above with history of coronary artery disease:  He is on dual antiplatelets with Brilinta and aspirin.  We will continue with that.  Continue the metoprolol, if blood pressure can tolerate, and Lipitor.  6.  History of dysphagia:  Status post GJ tube placement, but as per the patient's father, he can eat orally anything by mouth.  We will continue with that and use GJ tube for flushes or for medication use.    DEEP THROMBOSIS PROPHYLAXIS:  With SCDs.    CODE STATUS:  DNR/DNI as per the patient's father's wishes, who is the legal guardian for him as well.    The case discussed with ER physician and the nursing staff taking care of the patient.    Noel Kamara MD        D: 2022   T: 2022   MT: YUNIER    Name:     FAINA FORRESTER JUANCARLOS  MRN:      [id number]        Account:     [id number]   :      1980           Admitted:    2022       Document: [id number]

## 2022-01-18 NOTE — PROGRESS NOTES
[facility]    Medicine Progress Note - Hospitalist Service       Date of Admission:  1/17/2022    Assessment & Plan            This is a 41-year-old male with a history of ventricular fibrillation cardiac arrest with anoxic brain injury, ACS requiring intervention and PCI to proximal and distal LAD in 2020, cognitive impairment, depression, dysphagia status post GJ tube placement now eating orally, history of hypertension.  The patient is nonverbal and resident of [facility], was sent to the ED because of low blood pressure.       Hypotension:  Unclear etiology of hypotension.  Patient is difficult to evaluate as he is nonverbal but per his legal guardian/father he appears around his baseline.  Does not have leukocytosis, normal lactic acid, blood culture has been sent in the ED.  UA with no evidence of infection   -Renal function and electrolyte unremarkable, hemoglobin around his baseline  -Chest CT aortic survey with no acute abnormality in the chest abdomen or pelvis, no evidence of aortic aneurysm or dissection.  -Patient has been afebrile since admission  -Random cortisol around 3 PM yesterday was 11  -Echo is pending  -Patient's PTA metoprolol is currently on hold and he has been started on low-dose midodrine  -Continue holding amantadine(see below for indication)  -Check his procalcitonin  -If echocardiogram unchanged from before, negative Pro-Te, telemetry unremarkable and cultures continue to remain negative and he continues to remain afebrile he can probably be discharged to follow-up with PCP.    Coronary artery disease  Ischemic cardiomyopathy  Chronic systolic heart failure  History of refractory V. fib arrest status post NAZIA to LAD  Patient had V. fib arrest in May 2020 secondary to ST elevation MI secondary to acute LAD occlusion and had sustained anoxic brain injury  -Most recent echocardiogram with EF of 30-35%  -Patient with no chest pain currently.  Appears euvolemic on  physical exam  -Getting echocardiogram as above  -Continue PTA aspirin, Brilinta, Lipitor.  Holding PTA metoprolol due to soft/low blood pressure as above  -Strict I's and O's, daily weight     History of agitation and aggression:  Depression, major  -Continue PTA valproic acid, as needed Valium  -Outpatient follow-up with his primary cardiologist     History of dysphagia:  Status post GJ tube placement, but as per the patient's father, he can eat orally anything by mouth.  We will continue with that and use GJ tube for flushes or for medication use.        Diet: Combination Diet Regular Diet Adult    DVT Prophylaxis: Pneumatic Compression Devices  Silva Catheter: Not present  Central Lines: None  Code Status: No CPR- Do NOT Intubate      Disposition Plan   Expected Discharge: 01/19/2022     Anticipated discharge location:  Awaiting care coordination huddle  Delays:            The patient's care was discussed with the Bedside Nurse and Patient's Family.    Elsa Stout MD  Hospitalist Service  [facility]  Securely message with the Vocera Web Console (learn more here)  Text page via Visual [employer] Paging/Directory        Clinically Significant Risk Factors Present on Admission              # Platelet Defect: home medication list includes an antiplatelet medication       ______________________________________________________________________    Interval History   Patient non verbal.  Discussed with his father/legal guardian on phone.  He is about his baseline per father.  No other nursing concerns.    Data reviewed today: I reviewed all medications, new labs and imaging results over the last 24 hours      .Physical Exam   Vital Signs: Temp: 97.6  F (36.4  C) Temp src: Axillary BP: 103/63 Pulse: 65   Resp: 16 SpO2: 100 % O2 Device: None (Room air)    Weight: 180 lbs 0 oz  Exam:  Constitutional: Awake, alert and no distress.  Nonverbal.  Appears comfortable  Head: Normocephalic. No masses, lesions,  tenderness or abnormalities  ENT: ENT exam normal, no neck nodes or sinus tenderness  Cardiovascular: RRR.  no murmurs, no rubs or JVD  Respiratory:normal WOB,b/l equal air entry, no wheezes or crackles   Gastrointestinal: Abdomen soft, GJ tube in place. BS normal. No masses, organomegaly  : Deferred  Extremities :no edema , no clubbing or cyanosis        Data   Recent Labs   Lab 01/17/22  1443   WBC 9.5   HGB 13.2*   MCV 93         POTASSIUM 4.7   CHLORIDE 104   CO2 27   BUN 17   CR 0.74   ANIONGAP 4   TEN 8.9   GLC 92   ALBUMIN 3.8   PROTTOTAL 8.0   BILITOTAL 0.4   ALKPHOS 102   ALT 31   AST 22     Recent Results (from the past 24 hour(s))   CT Aortic Survey w Contrast    Narrative    CT AORTIC SURVEY WITH CONTRAST 1/17/2022 4:12 PM    CLINICAL HISTORY: Hypotension.    TECHNIQUE: Aortic survey protocol CT chest, abdomen, and pelvis.  Arterial phase through the chest, abdomen, and pelvis. Precontrast  images were also performed through the chest. Dose reduction  techniques were used.   CONTRAST: 80 mL Isovue-370  COMPARISON: CT of the chest, abdomen, and pelvis performed 6/15/2020.    FINDINGS:  VASCULATURE: No evidence for aortic aneurysm or dissection.    LUNGS AND PLEURA: No pleural effusions. Mild dependent atelectasis in  both lower lungs posteriorly. The lungs are otherwise clear. No  pneumothorax.    MEDIASTINUM/AXILLAE: No enlarged lymph nodes are identified in the  chest. No pericardial effusion.    CORONARY ARTERY CALCIFICATION: Previous intervention (stents or CABG).    HEPATOBILIARY: Unremarkable. No hepatic masses are seen.    PANCREAS: Normal.    SPLEEN: Normal.    ADRENAL GLANDS: Normal.    KIDNEYS/BLADDER: Unremarkable. No hydronephrosis.    BOWEL: Gastrostomy tube is again noted. No bowel obstruction. No  convincing evidence for colitis or diverticulitis. Prior appendectomy.    PELVIC ORGANS: Unremarkable.    LYMPH NODES: No enlarged lymph nodes are identified in the abdomen  or  pelvis.    ADDITIONAL FINDINGS: None.    MUSCULOSKELETAL: Bilateral L5 spondylolysis has a chronic appearance.  Mild degenerative changes are noted in the thoracolumbar spine.      Impression    IMPRESSION: No acute abnormality in the chest, abdomen, or pelvis. No  evidence for aortic aneurysm or dissection.    RACHELE STEPHEN MD         SYSTEM ID:  [id number]     Medications       aspirin  81 mg Oral or Feeding Tube Daily     atorvastatin  40 mg Oral or Feeding Tube QPM     LORazepam  0.5 mg Oral or Feeding Tube BID     [Held by provider] metoprolol tartrate  12.5 mg Oral BID     midodrine  2.5 mg Oral or Feeding Tube TID w/meals     PARoxetine  30 mg Oral or Feeding Tube QAM     sodium chloride (PF)  3 mL Intracatheter Q8H     ticagrelor  90 mg Oral or Feeding Tube BID     valproic acid  500 mg Oral or Feeding Tube BID

## 2022-01-18 NOTE — PLAN OF CARE
PT - Received order, reviewed chart and spoke with RN. She felt pt should be seen by PT. Attempted to eval pt, pt nonverbal and not following commands, uses lift at group home to transfer to w/c, is total care. Pt not following commands. Noted increased tone in bilateral UEs, understand pt was undergoing outpatient PT prior to being hospitalized. Will defer PT back to outpatient level of care, not appropriate for inpatient PT at this time. Anticipate return to group home at discharge. Recommend that nursing staff get pt up to chair with ceiling lift/sling. Will complete orders.

## 2022-01-18 NOTE — PLAN OF CARE
Occupational Therapy: Orders received. Chart reviewed and discussed with care team.?RN reports pt not following commands. Occupational Therapy not indicated due to pt with no IP OT needs, lives in group home and has assistance at baseline,  here for low BP. anticipate that he will continue with OP therapies at discharge as stated per chart.? Defer discharge recommendations to PT and care team.? Will complete orders. Please re-consult if new OT needs arise

## 2022-01-18 NOTE — PLAN OF CARE
Disoriented x4, nonverbal. Assist w/ lift. Incontinent of B&B, x1 bowel movement tonight. Moves around in bed a lot and tries to slip off the bed, bed alarm on and x3 side rails up. BP reminded in the high 80s/50s.

## 2022-01-18 NOTE — H&P
[facility]    History and Physical  Hospitalist       Date of Admission:  1/17/2022    Assessment & Plan     This is a 41-year-old male with a history of traumatic brain injury, ventricular fibrillation cardiac arrest with anoxic brain injury, ACS requiring intervention and PCI to proximal and distal LAD in 2020, cognitive impairment, depression, dysphagia status post GJ tube placement now eating orally, history of hypertension.  The patient is nonverbal and resident of [facility], was sent to the ED today because of low blood pressure.    ASSESSMENT AND PLAN:    1.  Hypotension:  This is a 41-year-old male with history of traumatic brain injury, ventricular fibrillation cardiac arrest, hypoxic brain injury, status post percutaneous coronary intervention with drug-eluting stent to proximal and mid left anterior descending.  He is on metoprolol b.i.d., now found to have low blood pressure without any symptoms.  I think this is pretty much his baseline blood pressure, as most of the workup is negative, including the lactic acid level.  The patient is awake and alert, moving all his extremities.  He is not in any distress.  There is no sign of any infection or sepsis.  There is no sign of any hemorrhage or hemorrhagic shock.  Hemoglobin is stable.  CT is negative.  Looking into his records, most of his last couple of years of visits were virtual visits, so no vitals were available, but in 11/2021 when he saw his PCP, his blood pressure was on the lower side as well, 96/60.  At the place he lives, they do not check vitals of the residents regularly, as he is in a group home.  He is asymptomatic at this time.  The only thing I will add is to check his random cortisol now and cortisol in the morning, put him on low-dose midodrine 2.5 mg b.i.d., hold his metoprolol for now, maybe cut down the dose, and do echocardiogram.  If his blood pressure remains stable, possibly this is his baseline blood  pressure without affecting his perfusion at this time.  However, I will hold any further IV fluids at this time given his cardiomyopathy.  2.  History of ischemic cardiomyopathy with ejection fraction of 30-35% on 05/19/2021:  Followed by Cardiology at [place].  Recommend possibility of AICD as well.  We will keep him on telemetry, keep him on metoprolol.  His blood pressure is improved.  Will do the echocardiogram, and we will see how he does.  3.  Traumatic brain injury:  He is on amantadine, which can cause hypotension and orthostatic hypotension.  I will hold amantadine for now.  4.  History of agitation and aggression:  He is on valproic acid, and followed by Psychiatry as outpatient.  Continue with that.  Continue with Valium as needed.  I think we need to hold the Atarax as well.  5.  Ischemic cardiomyopathy as mentioned above with history of coronary artery disease:  He is on dual antiplatelets with Brilinta and aspirin.  We will continue with that.  Continue the metoprolol, if blood pressure can tolerate, and Lipitor.  6.  History of dysphagia:  Status post GJ tube placement, but as per the patient's father, he can eat orally anything by mouth.  We will continue with that and use GJ tube for flushes or for medication use.    DEEP THROMBOSIS PROPHYLAXIS:  With SCDs.    CODE STATUS:  DNR/DNI as per the patient's father's wishes, who is the legal guardian for him as well.    The case discussed with ER physician and the nursing staff taking care of the patient.    Noel Kamara MD      DVT Prophylaxis: Pneumatic Compression Devices  Code Status: DNR / DNI    Disposition: Expected discharge in 2 days once stable    Noel Kamara MD    Primary Care Physician   Cachorro Morales    Chief Complaint   Low blood pressure     History is obtained from the patient father and ER Physician     History of Present Illness   Admitted: 01/17/2022    HISTORY OF PRESENT ILLNESS:  This is a 41-year-old male with a history of  traumatic brain injury, ventricular fibrillation cardiac arrest with anoxic brain injury, ACS requiring intervention and PCI to proximal and distal LAD in 2020, cognitive impairment, depression, dysphagia status post GJ tube placement now eating orally, history of hypertension.  The patient is nonverbal and resident of [facility], was sent to the ED today because of low blood pressure.    Most of the history is obtained from the patient's father, who is at the bedside, and the ER physician.  The patient is nonverbal and unable to provide any history.  Last week the physical therapist sent him back because of low blood pressure and did not do the physical therapy.  They recommended to check the blood pressure before sending the patient back for physical therapy.  They checked the blood pressure in [facility] today, which they usually do not do the vitals of the residents, and found to be low blood pressure, and systolic blood pressure was in 80s, so sent to the ED for evaluation.  The patient has no change in his status.  He remains awake and alert, moving upper extremities, and remained comfortable.  There is no history of any passing-out, fall, trauma or complaint of any pain at the [facility].  He has been eating well by mouth now, only uses GJ tube for the fluids and flushes.  He has no change in his medication lately.  Rest of the review of systems unobtainable at this time.    In the ED, he had extensive workup done.  His CBC, CMP, LFTs, lactic acid, TSH, hemoglobin, UA all came back negative.  He had a CT aortic survey which was unremarkable as well.  So, the Hospitalist Service was consulted to admit the patient.  He was given 1 liter of IV fluid bolus in the ED without change in his blood pressure.    Past Medical History    I have reviewed this patient's medical history and updated it with pertinent information if needed.   Past Medical History:   Diagnosis Date     Acute respiratory failure with hypoxia  (H)      Aggression 11/09/2020     Agitation 09/21/2020     LOWELL (acute kidney injury) (H)      Back injury, sequela 10/27/2017     Cardiac arrest (H) 05/17/2020     Cardiac arrest (H)      Cognitive disorder 11/11/2020     Depressive disorder      Dysphagia 11/11/2020     Dysphagia      Gastrojejunostomy tube status (H) 11/11/2020     HTN (hypertension)      Hypoxic ischemic encephalopathy      Low blood pressure 09/17/2020     Nonverbal 11/11/2020     NSTEMI (non-ST elevated myocardial infarction) (H)      TBI (traumatic brain injury) (H) 06/25/2020       Past Surgical History   I have reviewed this patient's surgical history and updated it with pertinent information if needed.  Past Surgical History:   Procedure Laterality Date     APPENDECTOMY       CARDIAC CATHETERIZATION  05/17/2020    Procedure: Coronary Angiogram; Surgeon: Chadd Newby MD; Location: [facility] CARDIAC CATH LAB       CV CORONARY ANGIOGRAM N/A 5/17/2020    Procedure: Coronary Angiogram;  Surgeon: Chadd Newby MD;  Location: [facility] CARDIAC CATH LAB     CV EXTRACORPERAL MEMBRANE OXYGENATION N/A 5/17/2020    Procedure: Extracorporeal Membrance Oxygenation;  Surgeon: Chadd Newby MD;  Location: [facility] CARDIAC CATH LAB     CV INTRA AORTIC BALLOON N/A 5/17/2020    Procedure: Intra-Aortic Balloon Pump Insertion;  Surgeon: Chadd Newby MD;  Location: [facility] CARDIAC CATH LAB     CV PCI STENT DRUG ELUTING N/A 5/17/2020    Procedure: Percutaneous Coronary Intervention Stent Drug Eluting;  Surgeon: Chadd Newby MD;  Location: [facility] CARDIAC CATH LAB     G TUBE REPLACEMENT  8/19/2020     G TUBE REPLACEMENT  3/10/2021     G TUBE REPLACEMENT  5/20/2021     IR GASTROSTOMY TUBE CHANGE  8/19/2020     IR GASTROSTOMY TUBE CHANGE  3/10/2021     IR GASTROSTOMY TUBE CHANGE  5/20/2021     IR GASTROSTOMY TUBE PERCUTANEOUS PLCMNT  6/9/2020     OTHER SURGICAL HISTORY  05/17/2020    CV PCI STENT DRUG ELUTINGProcedure: Percutaneous  Coronary Intervention Stent Drug Eluting; Surgeon: Chadd Newby MD; Location:  [facility]ART CARDIAC CATH LAB       OTHER SURGICAL HISTORY  05/17/2020    CV INTRA AORTIC BALLON PUMP INSERTION Procedure: Intra-Aortic Balloon Pump Insertion; Surgeon: Chadd Newby MD; Location:  HEART CARDIAC CATH LAB       OTHER SURGICAL HISTORY  05/19/2020    REMOVE EXTRACORPOREAL MEMBRANE OXYGENATOR Procedure: ECMO Decannulation at Bedside; Surgeon: Mariano Workman MD; Location:  OR       OTHER SURGICAL HISTORY  05/17/2020    CV EXTRACORPOREAL MEMBRANE OXGENATIONProcedure: Extracorporeal Membrance Oxygenation; Surgeon: Chadd Newby MD; Location:  HEART CARDIAC CATH LAB       PEG TUBE PLACEMENT  06/09/2020     REMOVE EXTRACORPORAL MEMBRANE OXYGENATOR ADULT (OUTSIDE OR) N/A 5/19/2020    Procedure: ECMO Decannulation at Bedside;  Surgeon: Mariano Workman MD;  Location:  OR     [facility] APPENDECTOMY      Description: Appendectomy;  Recorded: 08/25/2014;  Comments: age 11 or 12       Prior to Admission Medications   Prior to Admission Medications   Prescriptions Last Dose Informant Patient Reported? Taking?   ASPIRIN LOW DOSE 81 MG chewable tablet 1/17/2022 at am Nursing Home No Yes   Sig: TAKE ONE TABLET PER G TUBE EVERY DAY   Emollient (AQUAPHOR ADVANCED THERAPY) OINT as needed Nursing Home No Yes   Sig: Apply topically 2 times daily As needed.   LORazepam (ATIVAN) 0.5 MG tablet 1/17/2022 at am Nursing Home No Yes   Sig: Take 1 tablet (0.5 mg) by mouth 2 times daily   Neomycin-Bacitracin-Polymyxin (TRIPLE ANTIBIOTIC) 3.5-400-5000 OINT ointment as needed Nursing Home Yes Yes   Sig: Externally apply topically 2 times daily as needed   PARoxetine (PAXIL) 30 MG tablet 1/17/2022 at am Nursing Home No Yes   Sig: Take 1 tablet (30 mg) by mouth every morning   Valproate Sodium (VALPROIC ACID) 250 MG/5ML 1/17/2022 at am Nursing Home No Yes   Sig: Take 10 mLs (500 mg) by mouth 2 times daily   acetaminophen  (TYLENOL) 325 MG tablet as needed Nursing Home No Yes   Sig: Take 1 tablet (325 mg) by mouth every 6 hours as needed for mild pain or fever   amantadine (SYMMETREL) 50 MG/5ML syrup 2022 at am Nursing Home No Yes   Si mLs (50 mg) by Per G Tube route every 12 hours VIA PEG-Tube   atorvastatin (LIPITOR) 40 MG tablet 2022 at pm Nursing Home No Yes   Sig: TAKE ONE TABLET PER G TUBE EVERY NIGHT AT BEDTIME   docusate sodium (COLACE) 100 MG capsule as needed Nursing Home No Yes   Sig: Take 1 capsule (100 mg) by mouth 2 times daily as needed for constipation   hydrOXYzine (ATARAX) 10 MG tablet as needed Nursing Home No Yes   Sig: Take 1 tablet (10 mg) by mouth daily as needed for anxiety (30-60 minutes before appointments)   metoprolol tartrate (LOPRESSOR) 25 MG tablet 2022 at am Nursing Home No Yes   Sig: TAKE 1/2 TABLET BY MOUTH TWICE A DAY   nystatin (MYCOSTATIN) 862829 UNIT/GM external cream as needed Nursing Home No Yes   Sig: Apply topically 2 times daily As needed   ticagrelor (BRILINTA) 90 MG tablet 2022 at am Nursing Home No Yes   Sig: Take 1 tablet (90 mg) by mouth 2 times daily      Facility-Administered Medications: None     Allergies   No Known Allergies    Social History   I have reviewed this patient's social history and updated it with pertinent information if needed. Scot LINCOLN Bishop  reports that he quit smoking about 20 months ago. He started smoking about 26 years ago. He smoked 0.00 packs per day for 0.00 years. He has never used smokeless tobacco. He reports previous alcohol use. He reports previous drug use.    Family History   I have reviewed this patient's family history and updated it with pertinent information if needed.   Family History   Problem Relation Age of Onset     Parkinsonism Father      Diabetes Maternal Aunt      Diabetes Type 1 Maternal Aunt      Other - See Comments Father         spinal stenosis      Lung Cancer Maternal Grandfather      Cancer Maternal  Grandfather        Review of Systems   Review of systems not obtained due to patient factors - non verbal     Physical Exam   Temp: 99.1  F (37.3  C) Temp src: Rectal BP: (!) 88/48 Pulse: 71   Resp: 9 SpO2: 97 % O2 Device: None (Room air)    Vital Signs with Ranges  Temp:  [99.1  F (37.3  C)] 99.1  F (37.3  C)  Pulse:  [61-71] 71  Resp:  [9-27] 9  BP: (82-92)/(40-62) 88/48  SpO2:  [97 %-99 %] 97 %  180 lbs 0 oz    Constitutional: Awake, alert, non cooperative, no apparent distress, moving upper extremities well.   Eyes: Conjunctiva and pupils examined and normal.  HEENT: Moist mucous membranes  Respiratory: Clear to auscultation bilaterally, no crackles or wheezing.  Cardiovascular: Regular rate and rhythm, normal S1 and S2, and no murmur noted.  GI: Soft, non-distended, non-tender, normal bowel sounds.  Lymph/Hematologic: No anterior cervical or supraclavicular adenopathy.  Skin: No rashes, no cyanosis, no edema.  Musculoskeletal: No joint swelling, erythema or tenderness.  Neurologic: Cranial nerves 2-12 intact, normal strength and sensation.  Psychiatric: patient non verbal .    Data   Data reviewed today:  I personally reviewed the EKG tracing showing NSR, old changes. .  Recent Labs   Lab 01/17/22  1443   WBC 9.5   HGB 13.2*   MCV 93         POTASSIUM 4.7   CHLORIDE 104   CO2 27   BUN 17   CR 0.74   ANIONGAP 4   TEN 8.9   GLC 92   ALBUMIN 3.8   PROTTOTAL 8.0   BILITOTAL 0.4   ALKPHOS 102   ALT 31   AST 22       Recent Results (from the past 24 hour(s))   CT Aortic Survey w Contrast    Narrative    CT AORTIC SURVEY WITH CONTRAST 1/17/2022 4:12 PM    CLINICAL HISTORY: Hypotension.    TECHNIQUE: Aortic survey protocol CT chest, abdomen, and pelvis.  Arterial phase through the chest, abdomen, and pelvis. Precontrast  images were also performed through the chest. Dose reduction  techniques were used.   CONTRAST: 80 mL Isovue-370  COMPARISON: CT of the chest, abdomen, and pelvis performed  6/15/2020.    FINDINGS:  VASCULATURE: No evidence for aortic aneurysm or dissection.    LUNGS AND PLEURA: No pleural effusions. Mild dependent atelectasis in  both lower lungs posteriorly. The lungs are otherwise clear. No  pneumothorax.    MEDIASTINUM/AXILLAE: No enlarged lymph nodes are identified in the  chest. No pericardial effusion.    CORONARY ARTERY CALCIFICATION: Previous intervention (stents or CABG).    HEPATOBILIARY: Unremarkable. No hepatic masses are seen.    PANCREAS: Normal.    SPLEEN: Normal.    ADRENAL GLANDS: Normal.    KIDNEYS/BLADDER: Unremarkable. No hydronephrosis.    BOWEL: Gastrostomy tube is again noted. No bowel obstruction. No  convincing evidence for colitis or diverticulitis. Prior appendectomy.    PELVIC ORGANS: Unremarkable.    LYMPH NODES: No enlarged lymph nodes are identified in the abdomen or  pelvis.    ADDITIONAL FINDINGS: None.    MUSCULOSKELETAL: Bilateral L5 spondylolysis has a chronic appearance.  Mild degenerative changes are noted in the thoracolumbar spine.      Impression    IMPRESSION: No acute abnormality in the chest, abdomen, or pelvis. No  evidence for aortic aneurysm or dissection.    RACHELE STEPHEN MD         SYSTEM ID:  [id number]

## 2022-01-18 NOTE — PROGRESS NOTES
RECEIVING UNIT ED HANDOFF REVIEW    ED Nurse Handoff Report was reviewed by: Viridiana Myers RN on January 17, 2022 at 6:28 PM

## 2022-01-19 VITALS
WEIGHT: 180 LBS | TEMPERATURE: 93.3 F | OXYGEN SATURATION: 100 % | BODY MASS INDEX: 27.37 KG/M2 | RESPIRATION RATE: 16 BRPM | HEART RATE: 78 BPM | SYSTOLIC BLOOD PRESSURE: 97 MMHG | DIASTOLIC BLOOD PRESSURE: 61 MMHG

## 2022-01-19 LAB — BACTERIA UR CULT: NO GROWTH

## 2022-01-19 PROCEDURE — 250N000013 HC RX MED GY IP 250 OP 250 PS 637: Performed by: INTERNAL MEDICINE

## 2022-01-19 PROCEDURE — G0378 HOSPITAL OBSERVATION PER HR: HCPCS

## 2022-01-19 PROCEDURE — 99217 PR OBSERVATION CARE DISCHARGE: CPT | Performed by: INTERNAL MEDICINE

## 2022-01-19 RX ORDER — MIDODRINE HYDROCHLORIDE 2.5 MG/1
2.5 TABLET ORAL
Qty: 60 TABLET | Refills: 0
Start: 2022-01-19 | End: 2022-04-01

## 2022-01-19 RX ADMIN — LORAZEPAM 0.5 MG: 0.5 TABLET ORAL at 09:37

## 2022-01-19 RX ADMIN — TICAGRELOR 90 MG: 90 TABLET ORAL at 09:37

## 2022-01-19 RX ADMIN — VALPROIC ACID 500 MG: 250 SOLUTION ORAL at 09:54

## 2022-01-19 RX ADMIN — CARBIDOPA AND LEVODOPA 2.5 MG: 50; 200 TABLET, EXTENDED RELEASE ORAL at 13:08

## 2022-01-19 RX ADMIN — CARBIDOPA AND LEVODOPA 2.5 MG: 50; 200 TABLET, EXTENDED RELEASE ORAL at 09:36

## 2022-01-19 RX ADMIN — PAROXETINE HYDROCHLORIDE 30 MG: 30 TABLET, FILM COATED ORAL at 09:36

## 2022-01-19 RX ADMIN — ASPIRIN 81 MG CHEWABLE TABLET 81 MG: 81 TABLET CHEWABLE at 09:36

## 2022-01-19 NOTE — PROGRESS NOTES
Pt here with hypotension. Alert/ nonverbal/ Assist w lift.Incontient of B&B. Repositioned q2. Moves freely in bed. Bed alarm on. Vs stable ex BP soft. On tele. G tube patent. Regular diet needs total feed. No nonverbal indicators of pain at this time.

## 2022-01-19 NOTE — DISCHARGE SUMMARY
[facility]  Hospitalist Discharge Summary      Date of Admission:  1/17/2022  Date of Discharge:  1/19/2022  Discharging Provider: Elsa Stout MD      Discharge Diagnoses   Hypotension, asymptomatic  Coronary artery disease  Ischemic cardiomyopathy  Chronic systolic heart failure  History of refractory V. fib arrest status post NAZIA to LAD  History of agitation and aggression  Depression  History of dysphagia    Follow-ups Needed After Discharge   Follow-up Appointments     Follow-up and recommended labs and tests       Follow up with primary care provider, Cachorro Morales, within 7 days for   hospital follow- up.  Follow-up with primary cardiologist as previously scheduled  Regular blood pressure monitoring and routing to cardiologist for any   medication adjustment  Follow-up with your primary neurologist next available             Unresulted Labs Ordered in the Past 30 Days of this Admission     Date and Time Order Name Status Description    1/17/2022  2:36 PM Blood Culture Peripheral Blood Preliminary     1/17/2022  2:36 PM Blood Culture Peripheral Blood Preliminary     1/17/2022  2:36 PM Urine Culture Preliminary         Discharge Disposition   Discharged to home  Condition at discharge: Stable    Hospital Course              This is a 41-year-old male with a history of ventricular fibrillation cardiac arrest with anoxic brain injury, ACS requiring intervention and PCI to proximal and distal LAD in 2020, cognitive impairment, depression, dysphagia status post GJ tube placement now eating orally, history of hypertension.  The patient is nonverbal and resident of [facility], was sent to the ED because of low blood pressure.       Hypotension:    Patient is difficult to evaluate as he is nonverbal but  he appears around his baseline on physical examination.  Does not have leukocytosis, normal lactic acid, blood culture sent in the ED continue to remain negative prior to discharge .  UA  with no evidence of infection.   Patient continues to remain afebrile during the hospital stay  -Renal function and electrolyte unremarkable, hemoglobin around his baseline  -Chest CT aortic survey with no acute abnormality in the chest abdomen or pelvis, no evidence of aortic aneurysm or dissection.  -Random cortisol around 3 PM on the day of admission was 11  -Echo unchanged from previous  -PTA metoprolol and amantadine were held and patient started on midodrine with improvement in his blood pressure  -Patient to follow-up with his cardiologist as previously scheduled in about 2 weeks to discuss metoprolol and rest of his cardiac medication that might decrease his blood pressure dosing and also to discuss with his neurologist about further dosing of his amantadine    Coronary artery disease  Ischemic cardiomyopathy  Chronic systolic heart failure  History of refractory V. fib arrest status post NAZIA to LAD  Patient had V. fib arrest in May 2020 secondary to ST elevation MI secondary to acute LAD occlusion and had sustained anoxic brain injury  -Most recent echocardiogram with EF of 30-35%  -Patient with no chest pain currently.  Appears euvolemic on physical exam  -Echocardiogram with no new changes from previous echo  -Continue PTA aspirin, Brilinta, Lipitor.  Holding PTA metoprolol due to soft/low blood pressure as above  -Outpatient follow-up with his cardiologist as previously scheduled     History of agitation and aggression:  Depression, major  -Continue PTA valproic acid, as needed Valium  -Outpatient follow-up with his primary psychiatrist and neurologist  -PTA amantadine was held at discharge     History of dysphagia:  Status post GJ tube placement, but as per the patient's father, he can eat orally anything by mouth.    continue with that and use GJ tube for flushes or for medication use.       Consultations This Hospital Stay   CARE MANAGEMENT / SOCIAL WORK IP CONSULT  PHYSICAL THERAPY ADULT IP  CONSULT  OCCUPATIONAL THERAPY ADULT IP CONSULT    Code Status   No CPR- Do NOT Intubate    Time Spent on this Encounter   I, Elsa Stout MD, personally saw the patient today and spent less than or equal to 30 minutes discharging this patient.       Elsa Stout MD  [facility] ORTHOPEDICS SPINE  [address] [place] [address]  Phone: [phone]  Fax: [phone]  ______________________________________________________________________    Physical Exam   Vital Signs: Temp: (!) 93.3  F (34.1  C) Temp src: Axillary BP: 97/61 Pulse: 78   Resp: 16 SpO2: 100 % O2 Device: None (Room air)    Weight: 180 lbs 0 oz  Exam:  Constitutional: Awake, alert and no distress.  Nonverbal.  Appears comfortable  Head: Normocephalic. No masses, lesions, tenderness or abnormalities  ENT: ENT exam normal, no neck nodes or sinus tenderness  Cardiovascular: RRR.  No murmurs, no rubs or JVD  Respiratory: Normal.  WOB,b/l equal air entry, no wheezes or crackles   Gastrointestinal: Abdomen soft, non-tender.  GJ tube in place.  BS normal. No masses, organomegaly  : Deferred  Extremities : No edema , no clubbing or cyanosis         Primary Care Physician   Cachorro Morales    Discharge Orders      Home care nursing referral      Reason for your hospital stay    Hypotension     Follow-up and recommended labs and tests     Follow up with primary care provider, Cachorro Morales, within 7 days for hospital follow- up.  Follow-up with primary cardiologist as previously scheduled  Regular blood pressure monitoring and routing to cardiologist for any medication adjustment  Follow-up with your primary neurologist next available     Activity    Your activity upon discharge: activity as tolerated     Monitor and record    blood pressure daily and readings to PCP/cardiology for any medication adjustment  pulse daily     MD face to face encounter    Documentation of Face to Face and Certification for Home Health Services    I  certify that patient: Scot Bishop is under my care and that I, or a nurse practitioner or physician's assistant working with me, had a face-to-face encounter that meets the physician face-to-face encounter requirements with this patient on: 1/19/2022.    This encounter with the patient was in whole, or in part, for the following medical condition, which is the primary reason for home health care: Hypotension.    I certify that, based on my findings, the following services are medically necessary home health services: Nursing.    My clinical findings support the need for the above services because: Nurse is needed: To assess vitals, medical conditions after changes in medications or other medical regimen..    Further, I certify that my clinical findings support that this patient is homebound (i.e. absences from home require considerable and taxing effort and are for medical reasons or Buddhism services or infrequently or of short duration when for other reasons) because: Requires assistance of another person or specialized equipment to access medical services because patient: Requires supervision of another for safe transfer...    Based on the above findings. I certify that this patient is confined to the home and needs intermittent skilled nursing care, physical therapy and/or speech therapy.  The patient is under my care, and I have initiated the establishment of the plan of care.  This patient will be followed by a physician who will periodically review the plan of care.  Physician/Provider to provide follow up care: Cachorro Morales    Attending hospital physician (the Medicare certified [place] provider): Elsa Stout MD  Physician Signature: See electronic signature associated with these discharge orders.  Date: 1/19/2022     Diet    Follow this diet upon discharge: Orders Placed This Encounter      Combination Diet Regular Diet Adult       Significant Results and Procedures   Results for orders placed or  performed during the hospital encounter of 01/17/22   CT Aortic Survey w Contrast    Narrative    CT AORTIC SURVEY WITH CONTRAST 1/17/2022 4:12 PM    CLINICAL HISTORY: Hypotension.    TECHNIQUE: Aortic survey protocol CT chest, abdomen, and pelvis.  Arterial phase through the chest, abdomen, and pelvis. Precontrast  images were also performed through the chest. Dose reduction  techniques were used.   CONTRAST: 80 mL Isovue-370  COMPARISON: CT of the chest, abdomen, and pelvis performed 6/15/2020.    FINDINGS:  VASCULATURE: No evidence for aortic aneurysm or dissection.    LUNGS AND PLEURA: No pleural effusions. Mild dependent atelectasis in  both lower lungs posteriorly. The lungs are otherwise clear. No  pneumothorax.    MEDIASTINUM/AXILLAE: No enlarged lymph nodes are identified in the  chest. No pericardial effusion.    CORONARY ARTERY CALCIFICATION: Previous intervention (stents or CABG).    HEPATOBILIARY: Unremarkable. No hepatic masses are seen.    PANCREAS: Normal.    SPLEEN: Normal.    ADRENAL GLANDS: Normal.    KIDNEYS/BLADDER: Unremarkable. No hydronephrosis.    BOWEL: Gastrostomy tube is again noted. No bowel obstruction. No  convincing evidence for colitis or diverticulitis. Prior appendectomy.    PELVIC ORGANS: Unremarkable.    LYMPH NODES: No enlarged lymph nodes are identified in the abdomen or  pelvis.    ADDITIONAL FINDINGS: None.    MUSCULOSKELETAL: Bilateral L5 spondylolysis has a chronic appearance.  Mild degenerative changes are noted in the thoracolumbar spine.      Impression    IMPRESSION: No acute abnormality in the chest, abdomen, or pelvis. No  evidence for aortic aneurysm or dissection.    RACHELE STEPHEN MD         SYSTEM ID:  [id number]   Echocardiogram Complete     Value    LVEF  30-35%    Narrative    [id number]  [id number]  [id number]  [id number]^GRIS^SOLEDAD^GILDARDO     [facility]  Echocardiography Laboratory  [address] [place], [place] [address]     Name: FAINA FORRESTER  D  MRN: [id number]  : 1980  Study Date: 2022 01:45 PM  Age: 41 yrs  Gender: Male  Patient Location: [facility]  Reason For Study: CHF  Ordering Physician: SOLEDAD LANDRY  Referring Physician: Cachorro Morales  Performed By: Scot Washburn ALEXANDRE     BSA: 2.0 m2  Height: 68 in  Weight: 180 lb  HR: 90  BP: 84/55 mmHg  ______________________________________________________________________________  Procedure  Complete Portable Echo Adult. Optison (NDC #[id number]) given intravenously.  ______________________________________________________________________________  Interpretation Summary     There is mid and distal anterior, septal, and apical wall akinesis.  There is distal inferior wall akinesis.  The visual ejection fraction is 30-35%.  Right ventricular systolic pressure could not be approximated due to  inadequate tricuspid regurgitation.  IVC diameter <2.1 cm collapsing >50% with sniff suggests a normal RA pressure  of 3 mmHg.  Compared to the prior study dated 21, there have been no changes.  ______________________________________________________________________________  Left Ventricle  The left ventricle is normal in size. There is normal left ventricular wall  thickness. The visual ejection fraction is 30-35%. Left ventricular diastolic  function is indeterminate. There is anterior, septal, and apical wall  akinesis. There is inferior wall akinesis. There is no thrombus seen in the  left ventricle.     Right Ventricle  The right ventricle is normal in structure, function and size.     Atria  Normal left atrial size. Right atrial size is normal. Intact atrial septum.     Mitral Valve  The mitral valve is normal in structure and function.     Tricuspid Valve  The tricuspid valve is normal in structure and function. Right ventricular  systolic pressure could not be approximated due to inadequate tricuspid  regurgitation.     Aortic Valve  The aortic valve is not well visualized.     Pulmonic Valve  The  pulmonic valve is not well visualized.     Vessels  Normal size aorta. Normal size ascending aorta. IVC diameter <2.1 cm  collapsing >50% with sniff suggests a normal RA pressure of 3 mmHg.     Pericardium  The pericardium appears normal.     Rhythm  Sinus rhythm was noted.  ______________________________________________________________________________  MMode/2D Measurements & Calculations  IVSd: 0.70 cm     LVIDd: 4.9 cm  LVIDs: 3.1 cm  LVPWd: 0.65 cm  FS: 35.3 %  LV mass(C)d: 103.7 grams  LV mass(C)dI: 53.1 grams/m2  Ao root diam: 3.0 cm  LA dimension: 2.5 cm  LA/Ao: 0.83  LA Volume (BP): 35.1 ml  LA Volume Index (BP): 18.0 ml/m2  RWT: 0.27     Doppler Measurements & Calculations  MV E max shelby: 42.6 cm/sec  MV A max shelby: 51.7 cm/sec  MV E/A: 0.82  MV dec time: 0.22 sec  PA acc time: 0.15 sec  E/E' av.5  Lateral E/e': 6.4  Medial E/e': 8.5     ______________________________________________________________________________  Report approved by: Ashlee Allison 2022 03:49 PM               Discharge Medications   Current Discharge Medication List      START taking these medications    Details   midodrine (PROAMATINE) 2.5 MG tablet 1 tablet (2.5 mg) by Oral or Feeding Tube route 3 times daily (with meals)  Qty: 60 tablet, Refills: 0    Associated Diagnoses: Hypotension, unspecified hypotension type         CONTINUE these medications which have NOT CHANGED    Details   acetaminophen (TYLENOL) 325 MG tablet Take 1 tablet (325 mg) by mouth every 6 hours as needed for mild pain or fever  Qty: 90 tablet, Refills: 0    Comments: Dose change  Associated Diagnoses: Cardiac arrest (H)      ASPIRIN LOW DOSE 81 MG chewable tablet TAKE ONE TABLET PER G TUBE EVERY DAY  Qty: 28 tablet, Refills: 2    Associated Diagnoses: Cardiac arrest (H)      atorvastatin (LIPITOR) 40 MG tablet TAKE ONE TABLET PER G TUBE EVERY NIGHT AT BEDTIME  Qty: 30 tablet, Refills: 3    Associated Diagnoses: HLD (hyperlipidemia)      docusate  sodium (COLACE) 100 MG capsule Take 1 capsule (100 mg) by mouth 2 times daily as needed for constipation  Qty: 180 capsule, Refills: 0    Associated Diagnoses: Constipation, unspecified constipation type      Emollient (AQUAPHOR ADVANCED THERAPY) OINT Apply topically 2 times daily As needed.  Qty: 20 g, Refills: 1    Associated Diagnoses: Skin disorder      hydrOXYzine (ATARAX) 10 MG tablet Take 1 tablet (10 mg) by mouth daily as needed for anxiety (30-60 minutes before appointments)  Qty: 30 tablet, Refills: 3    Associated Diagnoses: Adjustment disorder with anxious mood      LORazepam (ATIVAN) 0.5 MG tablet Take 1 tablet (0.5 mg) by mouth 2 times daily  Qty: 60 tablet, Refills: 1    Associated Diagnoses: Disruptive mood dysregulation disorder (H)      Neomycin-Bacitracin-Polymyxin (TRIPLE ANTIBIOTIC) 3.5-400-5000 OINT ointment Externally apply topically 2 times daily as needed      nystatin (MYCOSTATIN) 299573 UNIT/GM external cream Apply topically 2 times daily As needed  Qty: 15 g, Refills: 1    Associated Diagnoses: Anoxic brain damage (H); Skin disorder      PARoxetine (PAXIL) 30 MG tablet Take 1 tablet (30 mg) by mouth every morning  Qty: 30 tablet, Refills: 3    Associated Diagnoses: Disruptive mood dysregulation disorder (H)      ticagrelor (BRILINTA) 90 MG tablet Take 1 tablet (90 mg) by mouth 2 times daily  Qty: 180 tablet, Refills: 3    Associated Diagnoses: Coronary artery disease due to lipid rich plaque; Acute systolic heart failure (H); Cardiac arrest (H)      Valproate Sodium (VALPROIC ACID) 250 MG/5ML Take 10 mLs (500 mg) by mouth 2 times daily  Qty: 600 mL, Refills: 1    Associated Diagnoses: Disruptive mood dysregulation disorder (H)         STOP taking these medications       amantadine (SYMMETREL) 50 MG/5ML syrup Comments:   Reason for Stopping:         metoprolol tartrate (LOPRESSOR) 25 MG tablet Comments:   Reason for Stopping:             Allergies   No Known Allergies

## 2022-01-19 NOTE — PROGRESS NOTES
Discharging to group home by EMS. Blood pressure soft and ok to discharge per hospilitist. Incontinent of B&B. Fluids and food total assistance needed. Father at bedside. Update given. No visible pain at this time.

## 2022-01-19 NOTE — PLAN OF CARE
Disoriented x4, nonverbal. Assist w/ lift. Incontinent of B&B, x1 bowel movement tonight. Moves around in bed a lot and tries to slip off the bed, bed alarm on and x3 side rails up. BP okay this evening.

## 2022-01-19 NOTE — CONSULTS
Care Management Initial Consult    General Information  Assessment completed with: Parents,  (Gerald)  Type of CM/SW Visit: Initial Assessment    Primary Care Provider verified and updated as needed: Yes   Readmission within the last 30 days: no previous admission in last 30 days      Reason for Consult: discharge planning  Advance Care Planning: Advance Care Planning Reviewed: present on chart          Communication Assessment  Patient's communication style: spoken language (English or Bilingual)    Hearing Difficulty or Deaf: no   Wear Glasses or Blind: no    Cognitive  Cognitive/Neuro/Behavioral: .WDL except,orientation,speech  Level of Consciousness: alert  Arousal Level: opens eyes spontaneously  Orientation: disoriented x 4  Mood/Behavior: calm  Best Language: 3 - Mute  Speech: unable to speak    Living Environment:   People in home: other (see comments) (Group home resident )     Current living Arrangements: group home      Able to return to prior arrangements: yes       Family/Social Support:  Care provided by: other (see comments) (Group home)  Provides care for: no one  Marital Status: Single  Parent(s),Guardian          Description of Support System: Supportive,Involved    Support Assessment: Adequate family and caregiver support,Adequate social supports    Current Resources:   Patient receiving home care services: No     Community Resources: Group Home  Equipment currently used at home: wheelchair, manual,wheelchair, power  Supplies currently used at home: None    Employment/Financial:  Employment Status:          Financial Concerns:             Lifestyle & Psychosocial Needs:  Social Determinants of Health     Tobacco Use: Medium Risk     Smoking Tobacco Use: Former Smoker     Smokeless Tobacco Use: Never Used   Alcohol Use: Not on file   Financial Resource Strain: Low Risk      Difficulty of Paying Living Expenses: Not hard at all   Food Insecurity: No Food Insecurity     Worried About Running Out of  Food in the Last Year: Never true     Ran Out of Food in the Last Year: Never true   Transportation Needs: No Transportation Needs     Lack of Transportation (Medical): No     Lack of Transportation (Non-Medical): No   Physical Activity: Inactive     Days of Exercise per Week: 0 days     Minutes of Exercise per Session: 0 min   Stress: Not on file   Social Connections: Unknown     Frequency of Communication with Friends and Family: Never     Frequency of Social Gatherings with Friends and Family: Three times a week     Attends Confucianism Services: Not on file     Active Member of Clubs or Organizations: No     Attends Club or Organization Meetings: Never     Marital Status: Not on file   Intimate Partner Violence: Not on file   Depression: Not at risk     PHQ-2 Score: 0   Housing Stability: Not on file       Functional Status:  Prior to admission patient needed assistance:              Mental Health Status:          Chemical Dependency Status:                Values/Beliefs:  Spiritual, Cultural Beliefs, Confucianism Practices, Values that affect care: no               Additional Information:  Per care management/social work consult for discharge planning, patient was admitted on 1/17/2022 with hypotension. Tentative date of discharge is 1/19/2022. Received a call from physician stating that patient's guardian is requesting a call about transport for today's discharge. Called Gerald (guardian) and asked about transport for patient. He said he will call the group home about transport for patient. If they can't, then he said he would need transport arranged for patient. He said he will call writer back.     Gerald (son) called and said that the group home asked for transport to be arranged. Called nurse and asked if patient needs a stretcher or w/c ride. She said patient would need a stretcher ride because he doesn't have trunk support and is confused. Told her writer would set up a stretcher ride. Called [facility] and  scheduled a stretcher ride for today at 1500.    Will continue to follow.    REGGIE Valencia

## 2022-01-19 NOTE — PROGRESS NOTES
Spoke to caregiver at . She reported that staff at the  are able to check daily BPs and communicate it to Scot's cardiologist. Writer was unable to reach Arlee's supervisor. Reached out to [facility]' main number and spoke to Steve, who confirmed that staff at the  can perform daily BP checks. Writer will include specific instructions with discharge paperwork.

## 2022-01-19 NOTE — DISCHARGE INSTRUCTIONS
Please document daily blood pressures and call them or fax them to  ATTN Dr. Angeline POOLE [facility]  [phone]  Scot has an upcoming appointment Tuesday, Feb 8th.     Please record blood pressure daily:

## 2022-01-19 NOTE — PROGRESS NOTES
Care Management Discharge Note    Discharge Date: 01/19/2022       Discharge Disposition:      Discharge Services:      Discharge DME:      Discharge Transportation:      Private pay costs discussed: transportation costs    PAS Confirmation Code:    Patient/family educated on Medicare website which has current facility and service quality ratings:      Education Provided on the Discharge Plan:  yes  Persons Notified of Discharge Plans: Guardian  Patient/Family in Agreement with the Plan:  yes    Handoff Referral Completed: Yes    Additional Information:  Patient returning to group home. [facility] stretcher ride set up for 1500. Patient needs stretcher ride as he's confused (disoriented times 4) and isn't able to sit in wheelchair. Completed PCS form for patient[facility] in chart [facility] called to confirm that patient is coming today. Told her patient is coming at 1500. She asked for ordres to be faxed. Faxed over orders to [phone].    REGGIE Valencia

## 2022-01-21 ENCOUNTER — TELEPHONE (OUTPATIENT)
Facility: CLINIC | Age: 42
End: 2022-01-21
Payer: COMMERCIAL

## 2022-01-21 LAB
ENTEROCOCCUS FAECALIS: NOT DETECTED
ENTEROCOCCUS FAECIUM: NOT DETECTED
LISTERIA SPECIES (DETECTED/NOT DETECTED): NOT DETECTED
STAPHYLOCOCCUS AUREUS: NOT DETECTED
STAPHYLOCOCCUS EPIDERMIDIS: NOT DETECTED
STAPHYLOCOCCUS LUGDUNENSIS: NOT DETECTED
STAPHYLOCOCCUS SPECIES: NOT DETECTED
STREPTOCOCCUS AGALACTIAE: NOT DETECTED
STREPTOCOCCUS ANGINOSUS GROUP: NOT DETECTED
STREPTOCOCCUS PNEUMONIAE: NOT DETECTED
STREPTOCOCCUS PYOGENES: NOT DETECTED
STREPTOCOCCUS SPECIES: NOT DETECTED

## 2022-01-21 NOTE — TELEPHONE ENCOUNTER
INTERNAL MEDICINE UPDATE:    We were called regarding a positive blood culture from 1/17 which became positive on the 4th day of incubation. 1 of 2 bottles positive for gram positive cocci bacilli, resembling diphtheroids. Verigene GP panel negative. Other set drawn from 1/17 remains negative. Overall, suspect contaminant. No further workup or evaluation based on this; pt should follow-up with PCP as previously scheduled. I updated pt's father (guardian) Gerald Bishpo.      Toño Banks Jr., MD  [phone] (p)  Text Page  Vocera

## 2022-01-21 NOTE — PROVIDER NOTIFICATION
Contacted by micro lab on 01/21/22 re: 1 of 4 bottles growing gram positive bacilli, Verigene panel negative, 4  Days after incubation. This is consistent with contaminant. No further action necessary.     Patient was admitted with hypotension, afebrile throughout his stay with normal WBC, no evidence infection. He has a history of Vfib arrest, but no history of device listed and no device checks under cardiology tab.     Therefore, this is a very likely to to be a contaminant.

## 2022-01-21 NOTE — PROVIDER NOTIFICATION
Brief update:    Paged re: blood culture result from 1/17/2022, 1 of 2 bottles, 1 of 2 sets of blood cultures from that date with gram-positive bacilli resembling diphtheroids on the fourth day of incubation.    Was recently hospitalized for some hypotension, no infectious source identified. CT aortic survey on admission at that time chart review    At discharge, plan was for group home to check daily blood pressures and report to cardiology service    Question contaminant given low virulence, only 1 of 4 bottles with culture positivity on day 4 of intubation. Discussed with microbiology lab; verigene results should be available in approximately 2 hours.    When Verigene results available, group home can be contacted regarding any further symptoms and possible initiation of treatment if concern for occult infectious process. Will place on signout list for follow-up if not resulted by 7    Scot Kirkland MD  4:50 AM

## 2022-01-22 LAB — BACTERIA BLD CULT: NO GROWTH

## 2022-01-23 LAB
BACTERIA BLD CULT: ABNORMAL
BACTERIA BLD CULT: ABNORMAL

## 2022-01-26 ENCOUNTER — HOSPITAL ENCOUNTER (OUTPATIENT)
Dept: NUTRITION | Facility: CLINIC | Age: 42
Discharge: HOME | End: 2022-01-26
Attending: DIETITIAN, REGISTERED | Admitting: DIETITIAN, REGISTERED
Payer: COMMERCIAL

## 2022-01-26 PROCEDURE — 97803 MED NUTRITION INDIV SUBSEQ: CPT | Mod: GT,95 | Performed by: DIETITIAN, REGISTERED

## 2022-01-26 NOTE — PROGRESS NOTES
Video-Visit Details    Type of service:  Video Visit    Video Start Time (time video started): 1:00    Video End Time (time video stopped): 1:14    Originating Location (pt. Location): Home-group home     Distant Location (provider location):  [facility] NUTRITION SERVICES     Mode of Communication:  Video Conference via [facility]    OUTPATIENT NUTRITION ASSESSMENT (VIDEO VISIT DUE TO COVID-19)   REASON FOR ASSESSMENT  Scot Bishop referred by Dr. Morales for MNT related to   Traumatic brain injury with loss of consciousness, sequela (H) [S06.9X9S]  - Primary       G tube feedings (H) [Z93.1]         Patient accompanied by Gerald, father; step mother; staff member, Priyanka          ASSESSMENT   Nutrition History:  - Information obtained from father, step mom and staff worker.  Patient's step mom describes patient's eating habits as limited food choices with hamburgers and pizza but now eats all foods provided to him. Patient's father states that when they feed him, patient will eat most of his meal.  Priyanka, staff worker, states patient eats 45-50% of meals.  Patient currently living at [facility] in [place].  Patient has 7 year old son.      Patient's father states patient's  lbs. Patient is on a regular diet with thin liquids.  Patient is a total feed.     Patient has regular formed bowel movements either daily or every other day.       7/5/2021 Received calorie count from group [place].  Patient eating 100% of meals.  Patient receiving 2 meals per day as  states patient will vomit if given breakfast after TF administration.  Patient having difficulty drinking liquids as will chew on cups or straws.  Patient's father bought sippy cup for patient to try and seems to do well with it. Patient will drink 16 oz fluid (orally) water and milk.  Patient receiving 3 (60 mL) water flushes 8 times per day.   asking if evening flushes can be changed due to  prevent waking patient 3 times per night.        7/19 No calorie count provided by [facility].  Diet recall past 24 hours: scrambled egg and yogurt; tuna sandwich, banana and water; spaghetti with shrimp, orange and milk-drank 25%. Patient continues to drink with sippy cup.  Patient's father states can drink entire sippy cup (8-10 oz in 1 hour). Staff members don't feel patient will drink more than 8 oz per day from sippy cup. Patient does not eat full breakfast due to previous vomiting after the TF.  Per father, patient working with speech therapist, PT and OT weekly.      8/2/2021 Patient's step mother states that patient will drink entire sippy cup of 8 oz if someone helps him.  [facility], states did not receive order to reduce TF so has continued to run at 70 mL/hr x 11 hours. Patient having regular daily stools.       8/18/2021 Received food log from [facility].  Patient eating 2-3 meals + 1-2 snacks (yogurt or applesauce).  Pancakes for breakfast.  Lunch -turkey sandwich or spaghetti with meat sauce Dinner: chicken vale.  Parents state patient can drink liquids when continually offered.  Father asking if patient can use different cup to increase fluid intake.       9/13/2021 Patient continues on current TF regimen of Isosource 1.5 @ 70 mL/hr x 11 hours.  Staff have been increasing protein intake in diet to help improve protein intake.  Note patient to participate in [employer] for OT/PT/ST.  Staff using syringe to provide fluid to remove food from teeth and oral cares.  Patient given water through syringe for fluids also.        10/18/2021 Patient's TF was reduced to 35 mL/hr x 11 hours.  Patient having daily-every other day bowel movement.  Patient will occasionally have constipation and then will be given protocol for constipation.  Patient will eat all foods given to him.  Patient having sippy cup for fluids.  [facility] staff director states receiving 6-8 oz fluid at meals.  " Patient will be given water flush through syring into mouth 30 mL per time. Continues to receive 180 mL every 2 hours during the day (7 AM-8 PM).      11/22/2021 Patient will have swallow evaluation in December.  Patient continues with same TF regimen.  Group home director states water flush used to swish mouth not as way to provide fluid needs.     1/26/2022 Patient has been able to drink fluid out of water bottle. May drink 16 oz fluids with meals.  Patient has speech evaluation and able to switch cup on how to drink fluids.  Per staff, patient will eat all foods provided to him.  Patient eating 3 meals + 2 snacks.  Received food log from [facility].  RD received food logs without recorded portion sizes.       Diet Recall:  Breakfast: 3 waffles with 2 scrambled eggs + 4 oz milk   Lunch: chicken sandwich with 4 oz water   Dinner: chicken with pasta; fried rice with chicken and vegetables + 4 oz water   Snack: chips; sugar free pudding   Beverages: water; milk pop      Exercise: Patient working with PT.      NUTRITION FOCUSED PHYSICAL ASSESSMENT (NFPA) FOR DIAGNOSING MALNUTRITION  Yes         Observed:   No nutrition-related physical findings observed     Obtained from Chart/Interdisciplinary Team:  Non-healing wound near G tube      LABS  Labs reviewed     MEDICATIONS/SUPPLEMENTS  Medications/supplements reviewed-no MVI      ANTHROPOMETRICS   Height: 5'8\"  Weight: 168.2 lbs (1/26/2022)  BMI (kg/m2): 26.1 kg/m2  Weight Status:  Overweight   %IBW: 116%  Weight History: Patient with 6% weight loss in 3 months with reduction in TF regimen which is appropriate due to BMI 26.1 kg/m2   Per group home weights:  168.2 (1/26/2022)  170.8 lbs (1/4/2022)  168.4 lbs (12/28/2021)  165.8 lbs (12/21/2021)  166.8 lbs (12/14/2021)   172 lbs (11/22/2021)  179.6 lbs (10/18/2021)  Wt Readings from Last 10 Encounters:   01/17/22 81.6 kg (180 lb)   11/22/21 78 kg (172 lb)   10/18/21 81.5 kg (179 lb 9.6 oz)   09/13/21 83.1 kg (183 lb 3.2 " oz)   08/18/21 80.7 kg (178 lb)   08/02/21 80.4 kg (177 lb 3.2 oz)   07/19/21 83.7 kg (184 lb 9.6 oz)   07/05/21 81.8 kg (180 lb 6.4 oz)   02/19/21 108.4 kg (239 lb)   02/02/21 69.4 kg (153 lb)     ASSESSED NUTRITION NEEDS  Estimated Energy Needs: 8726-0018 kcals/day (25-30 Kcal/Kg)  Justification:  (maintenance)  Estimated Protein Needs:  grams protein/day (1.2-1.5 g pro/Kg)  Justification:  (preservation of lean body mass)  Estimated Fluid Needs: 2752-6010 mL/day (30-35 mL/kg)      NUTRITION SUPPORT ENTERAL:  Type of Feeding Tube: G tube   Enteral Frequency:  Cyclic  Enteral Regimen: Isosource 1.5   Total Enteral Provisions:Isosource 1.5 at 35 mL/hr x 11 hours -7 PM - 8 AM) to provide 575 calories, 26 grams protein, 292 mL free fluid and 6 grams fiber  1440 water flush + 292 free water from TF + 540 mL oral fluids = 2272 mL (28 mL/kg ABW)  Adjust fluid flushes 180 mL water flush 8 times per day   180 mL water flush before and after tube feeding (8 PM and 7 AM)  180 mL water flush 9 AM, 11 AM, 1 PM, 3 PM, 5 PM, 7 PM       ASSESSED MALNUTRITION STATUS  % Weight Loss:  None noted  % Intake:  No decreased intake noted  Subcutaneous Fat Loss:  None observed  Loss of Muscle Mass:  None observed  Fluid Retention:  None noted     Malnutrition Diagnosis:  Patient does not meet two of the above criteria necessary for diagnosing malnutrition     EVALUATION/PROGRESS TOWARDS GOALS  Previous nutrition goals:  Weekly weight-improving   Calorie count for 7 days (portion provided and amount eaten)-improving   Isosource 1.5 at 35 mL/hr x 11 hours (8 PM-7AM)-met   180 mL water flush at 8 PM and 7 AM (before and after TF administration)-met   180 mL water flush 9 AM, 11 PM, 1 PM, 3 PM, 5 PM, 7 PM-met   Add protein source at breakfast-improving   Measure protein source at lunch and dinner-not met      Previous nutrition diagnosis: Excessive nutrient intake related to G-tube feeding and oral intake as evidenced by previous 15%  weight gain in 8 months and recent 6% weight loss with reduction on TF regimen -resolved      Current nutrition diagnosis: Excessive nutrient intake related to G-tube feeding and oral intake as evidenced by previous 15% weight gain in 8 months and recent 6% weight loss with reduction on TF regimen      INTERVENTIONS   Nutrition Prescription   Recommend 7 day calorie count to assess oral intake to wean TF; add protein source at breakfast; protein bar as snack      IMPLEMENTATION   Assessed learning needs and learning preference  Teaching Method(s) used: Explanation  Vitamin and Mineral Supplements: suggest complete multivitamin and mineral once weaned off TF  Diet Education:  Provided education on calorie intake   Nutrition Education (Content):              a)  Discussed progress towards goals.  Patient's TF running at 35 mL/hr x 11 hours.  Patient's weight has been variable and within normal limits.  Due to patient able to drink from water bottle, recommend trial of eliminating flushes through FT.  Patient to consume 80 oz fluid per day.  Suggest 3-8 oz milk per day at meals with 8 oz water + additional 32 oz orally (16 oz between breakfast and lunch and 16 oz between lunch and dinner).  Patient requires 70-80 fluids per day and patient currently consuming 48 oz+ per day.  Appreciate speech therapy evaluation.  Patient's family and staff member verbalizes understanding of next steps.     GOALS  Recommend discontinue tube feeding at night  Recommend discontinue water flushes  8 oz milk 3 times per day at meals  16 oz water from breakfast to lunch  16 oz water from lunch to dinner   Weekly weight  Track fluid intake for 1 week  Start complete multivitamin and mineral      FOLLOW UP/MONITORING   Progress towards goals will be monitored and evaluated per protocol and Practice Guidelines  Group home staff to send weight and food logs prior to next appointment  Patient to follow up in 4 weeks  RD name and number  provided      Time Spent with Patient  14 minutes     Lauryn Licona, RD, LD  [facility] Outpatient Dietitian  [phone] (office phone)

## 2022-02-01 ENCOUNTER — PATIENT OUTREACH (OUTPATIENT)
Dept: NURSING | Facility: CLINIC | Age: 42
End: 2022-02-01
Payer: COMMERCIAL

## 2022-02-01 NOTE — PROGRESS NOTES
"Clinic Care Coordination Contact    Follow Up Progress Note      Assessment:   Patient was admitted to [facility] on 1/17/2022 with a diagnoses of Hypotension, CAD, ischemic cardiomyopathy, chronic systolic heart failure and discharged back to [facility] on 1/19/2022.    RNCC spoke with Vandana due to Gerald not being available.  Patient is doing \"well\" since discharge from hospital.and the Group Home is taking patient's  blood pressure daily.  Vandana states the patient's blood pressures have been \"all over\"  but seem to be \"leveling out\".    Phong are happy with the care given to patient at Group Home and Gerald and Vandana bring the patient to all of his appointments or video visits.  The PCP appointment needed to be re-scheduled to 2/7/2022 and they are hoping to discuss the removal of the G Tube and stop tube feedings as patient is able to drink water through a sippy cup or water bottle.  They will also discuss with PCP an order for the patient to continue Physical Therapy even though patient's blood pressure is outside of Physical Therapy standard range.    Patient has been going to his outpatient physical therapy appointments and Gerald and Vandana are hoping to get an order for a stand up lift so patient can physically stand to gain strength to sit in his wheelchair more, especially for meals. Gerald Ross are still coming to terms with patient's condition and they are hoping patient is not at his baseline.    Vandana asked for dental services for patient as he hasn't gone to the dentist since \"the incident\".  RNCC suggested calling patient's insurance ([facility]) for dentists covered by insurance and which Dentist would have the service to anesthetize the patient due to patient's inability to keep his mouth open for routine cleaning.     Care Gaps:    Health Maintenance Due   Topic Date Due     DEPRESSION ACTION PLAN  Never done       Care Gaps Last addressed on " 2/1/2022    Goals addressed this encounter:   Goals Addressed                    This Visit's Progress       Functional (pt-stated)   70%      Goal Statement: Patient will have a plan for future rehabilitation services after home care PT ends which would include outpatient Physical Therapy  Date Goal set: 8/20/2021  Barriers: Deconditioned   Strengths:Supportive parents   Date to Achieve By: 12/20/2021 // extended to 6/20/2022  Patient expressed understanding of goal: Father agrees with the plan   Action steps to achieve this goal:  1. I/caregiver will keep future appointments; 12/10/2021 Swallow Eval, 12/20/2021 Outpatient Physical Therapy; [facility] Health Clinic 12/31/2021, 1/3/2022 Outpatient Physical Therapy, 1/17/2022 Nutrition  2. I/caregiver will discuss, review, schedule and complete recommended overdue health maintenance with my primary care provider  3. I/caregiver will contact my care team with questions, concerns, support needs   4. I/caregiver will use the clinic as a resource, and I understand I can contact my clinic with 24/7 after hours services available   5. Care Coordinator will remain available as needed              Intervention/Education provided during outreach:   RNCC called and spoke with patient; introduced self, discussed role of Care Coordination and explained reason for call     Outreach Frequency: monthly    Plan:   -Patient will contact the care team with questions, concerns, support needs   -Patient will use the clinic as a resource and understands (s)he can contact the [facility] with 24/7 after hours services available  -Care Coordinator will remain available as needed  -RNCC will follow up in one month for follow up appointments/recommendations and goal progression     Candace Perez RN, BSN, PHN  Primary Care / Care Coordinator   [facility]  [email or web address]   [phone]

## 2022-02-07 ENCOUNTER — OFFICE VISIT (OUTPATIENT)
Dept: INTERNAL MEDICINE | Facility: CLINIC | Age: 42
End: 2022-02-07
Payer: COMMERCIAL

## 2022-02-07 VITALS
HEART RATE: 77 BPM | RESPIRATION RATE: 14 BRPM | SYSTOLIC BLOOD PRESSURE: 90 MMHG | OXYGEN SATURATION: 100 % | TEMPERATURE: 97.7 F | DIASTOLIC BLOOD PRESSURE: 58 MMHG

## 2022-02-07 DIAGNOSIS — Z93.1 G TUBE FEEDINGS (H): ICD-10-CM

## 2022-02-07 DIAGNOSIS — Z09 HOSPITAL DISCHARGE FOLLOW-UP: Primary | ICD-10-CM

## 2022-02-07 DIAGNOSIS — S06.9X9S TRAUMATIC BRAIN INJURY WITH LOSS OF CONSCIOUSNESS, SEQUELA (H): ICD-10-CM

## 2022-02-07 DIAGNOSIS — I46.9 CARDIAC ARREST (H): ICD-10-CM

## 2022-02-07 DIAGNOSIS — I95.9 HYPOTENSION, UNSPECIFIED HYPOTENSION TYPE: ICD-10-CM

## 2022-02-07 PROBLEM — B37.9 YEAST INFECTION: Status: RESOLVED | Noted: 2020-09-03 | Resolved: 2022-02-07

## 2022-02-07 PROBLEM — M54.9 BACKACHE: Status: RESOLVED | Noted: 2021-04-16 | Resolved: 2022-02-07

## 2022-02-07 PROBLEM — R47.01 NONVERBAL: Status: RESOLVED | Noted: 2020-11-11 | Resolved: 2022-02-07

## 2022-02-07 PROCEDURE — 99214 OFFICE O/P EST MOD 30 MIN: CPT | Performed by: INTERNAL MEDICINE

## 2022-02-07 NOTE — PROGRESS NOTES
"  Assessment & Plan     Hospital discharge follow-up  Appears overall stable.    Hypotension, unspecified hypotension type  No focal changes.  Noted medication held.  Source of lower blood pressure does not appear to be of obvious evidence.  Minimal symptoms suggestive of symptomatic orthostasis.  Patient will follow up with cardiology as scheduled for reevaluation    Cardiac arrest (H)  Noted as baseline history    Traumatic brain injury with loss of consciousness, sequela (H)  We will place referral for physical therapy for patient to be seen at [facility] for ongoing balance and strengthening and conditioning.  - Physical Therapy Referral; Future    G tube feedings (H)  We will approve a 3-week trial of holding feeding tubes.  Suggest monitoring I's and O's as well as weights and oral intake in regards to calories.  Suggest converting medicine to oral versus G-tube use.  Apparently have an appointment scheduled with a nutritionist in about 2 weeks.  Discussed issues of monitoring daily ins and outs as well as weights.     BMI:   Estimated body mass index is 27.37 kg/m  as calculated from the following:    Height as of 2/19/21: 1.727 m (5' 8\").    Weight as of 1/17/22: 81.6 kg (180 lb).   Weight management plan: Discussed healthy diet and exercise guidelines    See Patient Instructions    Return in about 1 day (around 2/8/2022) for f/u [facility] or primary Cardiologist.    Cachorro Morales MD  [facility]    Carlos Ellison is a 41 year old who presents for the following health issues  accompanied by his father.    HPI     Patient is here today for follow-up with his personal care attendant and I believe his father.  They are interested in getting some physical therapy done through [facility] where he apparently has been evaluated in the past.  There has been of some concern in regards to the patient's blood pressure being slightly low which led " to his recent hospitalization.  Medications were adjusted.  He comes the clinic today for follow-up with no major complaints.    They would also like to consider holding his G-tube use for about 3 weeks to see if he can tolerate oral intake as well as medications and maintain weight and volume status.  He has been seen and evaluated through the nutrition services.    Hospital Follow-up Visit:    Hospital/Nursing Home/IP Rehab Facility: [facility]  Date of Admission: 1/17/22  Date of Discharge: 1/19/22  Reason(s) for Admission: Hypotension       Was your hospitalization related to COVID-19? No   Problems taking medications regularly:  None  Medication changes since discharge: None  Problems adhering to non-medication therapy:  None    Summary of hospitalization:  [facility] discharge summary reviewed  Diagnostic Tests/Treatments reviewed.  Follow up needed: Cardiology  Other Healthcare Providers Involved in Patient s Care:         None  Update since discharge: stable. Post Discharge Medication Reconciliation: discharge medications reconciled, continue medications without change.  Plan of care communicated with patient        Hypotension:    Patient is difficult to evaluate as he is nonverbal but  he appears around his baseline on physical examination.  Does not have leukocytosis, normal lactic acid, blood culture sent in the ED continue to remain negative prior to discharge .  UA with no evidence of infection.   Patient continues to remain afebrile during the hospital stay  -Renal function and electrolyte unremarkable, hemoglobin around his baseline  -Chest CT aortic survey with no acute abnormality in the chest abdomen or pelvis, no evidence of aortic aneurysm or dissection.  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 fib arrest in May 2020 secondary to ST elevation MI secondary to acute LAD occlusion and had sustained anoxic brain injury  -Most recent echocardiogram with EF of 30-35%  -Patient with no chest pain currently.  Appears euvolemic on physical exam  -Echocardiogram with no new changes from previous echo  -Continue PTA aspirin, Brilinta, Lipitor.  Holding PTA metoprolol due to soft/low blood pressure as above  -Outpatient follow-up with his cardiologist as previously scheduled      History of agitation and aggression:  Depression, major  -Continue PTA valproic acid, as needed Valium  -Outpatient follow-up with his primary psychiatrist and neurologist  -PTA amantadine was held at discharge    Other concerns:  1. Patient needs approval to resume PT along with parameters due to low blood pressures. Fax approval to [phone]  2. Discuss trial of holding feeding tube for 3-4 weeks as this was recommended by nutritionist to see if feeding tube can be discontinues.      Review of Systems:    CONSTITUTIONAL: NEGATIVE for fever, chills, change in weight  EYES: NEGATIVE for vision changes or irritation  ENT/MOUTH: NEGATIVE for ear, mouth and throat problems  RESP: NEGATIVE for significant cough or SOB  CV: NEGATIVE for chest pain, palpitations or peripheral edema  GI: NEGATIVE for nausea, abdominal pain, heartburn, or change in bowel habits  : NEGATIVE for frequency, dysuria, or hematuria  MUSCULOSKELETAL: NEGATIVE for significant arthralgias or myalgia  HEME: NEGATIVE for bleeding problems       Objective    BP 90/58   Pulse 77   Temp 97.7  F (36.5  C) (Temporal)   Resp 14   SpO2 100%   There is no height or weight on file to calculate BMI.     Physical Exam   GENERAL: alert and no distress wheelchair.  EYES: Eyes grossly normal to inspection, PERRL and conjunctivae and sclerae normal  HENT: ear canals and TM's normal, nose and mouth without ulcers or lesions  NECK: no adenopathy, no asymmetry, masses, or scars and thyroid normal to palpation  RESP: lungs clear to auscultation - no rales, rhonchi or wheezes  G-tube site  CV: regular rate and rhythm, normal S1 S2, no S3 or S4, no click or rub  MS: Unable to fully assess as in wheelchair.  NEURO: noted baseline TBI.  Nonverbal.  PSYCH: mentation appears normal, affect normal/bright

## 2022-02-08 ENCOUNTER — OFFICE VISIT (OUTPATIENT)
Dept: CARDIOLOGY | Facility: CLINIC | Age: 42
End: 2022-02-08
Payer: COMMERCIAL

## 2022-02-08 VITALS
OXYGEN SATURATION: 100 % | BODY MASS INDEX: 27.37 KG/M2 | SYSTOLIC BLOOD PRESSURE: 99 MMHG | HEART RATE: 53 BPM | WEIGHT: 180 LBS | DIASTOLIC BLOOD PRESSURE: 62 MMHG

## 2022-02-08 DIAGNOSIS — I46.9 CARDIAC ARREST (H): ICD-10-CM

## 2022-02-08 DIAGNOSIS — I50.21 ACUTE SYSTOLIC HEART FAILURE (H): ICD-10-CM

## 2022-02-08 DIAGNOSIS — I25.83 CORONARY ARTERY DISEASE DUE TO LIPID RICH PLAQUE: ICD-10-CM

## 2022-02-08 DIAGNOSIS — I25.10 CORONARY ARTERY DISEASE DUE TO LIPID RICH PLAQUE: ICD-10-CM

## 2022-02-08 PROCEDURE — 99214 OFFICE O/P EST MOD 30 MIN: CPT | Performed by: INTERNAL MEDICINE

## 2022-02-08 RX ORDER — SODIUM CHLORIDE 1 G/1
1 TABLET ORAL DAILY PRN
Qty: 60 TABLET | Refills: 3 | Status: SHIPPED | OUTPATIENT
Start: 2022-02-08 | End: 2023-02-20

## 2022-02-08 NOTE — PATIENT INSTRUCTIONS
1. Physical therapy blood pressure threshold: can do PT with 85/45 mmHg   2. In the morning, if blood pressure drops lower than 85/45 mmHg, then can take a salt tab with water   3. Stop Brilinta. Continue aspirin 81 mg daily   4. Discussed defibrillator therapy which we will defer given DNR status   5. Follow up with Dr. Marcus in one year

## 2022-02-08 NOTE — LETTER
2/8/2022    Cachorro Morales MD  [address] [facility] [address]    RE: Scot Bishop       Dear Colleague,     I had the pleasure of seeing Scot Bishop in the [facility].        HPI:     This is a 41 year old male here with his father who is primary caregiver and mother has joined as well. He has PMH Hypoxic Brain Injury secondary to VF arrest with persistent moderate-severe encephalopathy, TBI, nonverbal, respiratory failure requiring trach placement, dysphagia (on tube feeding), cardiac arrest (s/p successful aspiration thrombectomy and PCI w/ NAZIA of proximal and mid LAD). He has ischemic cardiomyopathy with EF 30-35%. He is here for follow up.    From prior visit:    Hospitalized 11/11/20-11/27/20 at [facility] for increased agitation: infectious source negative. Neurology consulted and medications switched around. 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 Admission at [facility] 6/25/20-7/31/20. His initial episode was in May 2020.      Per his records:     Hospitalized 5/17/20-6/25/20 for cardiac arrest: Pt reportedly had chest pain that started on 5/16/20. He was using Drano on his pipes at home and did not initially seek medical attention for CP and SOB since it said on the box that Drano can cause those symptoms. He took a shower and had syncopal episode after coming out of the shower. EMS was called; initial rhythm asystole,after several rounds CPR, --> PEA --> VF in the field. After multiple cycles of epinephrine had ROSC. He was intubated in the field. BP was 90s/60s as he was being transported from ED but he had recurrent cardiac arrest on arrival to Cath Lab. ECG showed ST elevation in aVR. He was promptly placed on VA ECMO. He had thrombotic occlusion of proximal LAD and underwent successful aspiration thrombectomy and PCI w/ NAZIA of proximal and mid LAD. Patient remained in NSR with rates in  the 110-120's. Suspect ongoing tachycardia d/t evolving MI and post-arrest state. TTE 6/10 showed LVEF 36% with LAD territory akinesis, RV normal. Patient now on GDMT. 5/27, pt had 23 second run of VT, now ST with rare PVCs. ECMO 5/17-5/19, IABP 5/19-5/20, trach 6/8, PEG 6/9.     Has speech, physical and occupational therapy. Blood pressure has been low and occasional tachycardia. He is on low dose metoprolol and lisinopril which had to be stopped. He was started on midodrine three times a day.      Last echocardiogram reviewed:      Interpretation Summary  LVEF 36% based on biplane 2D tracing.  LAD territory akinesis.  Right ventricular function, chamber size, wall motion, and thickness are  normal.  The inferior vena cava is normal.  No pericardial effusion is present.  There has been no change.     We repeated echocardiogram which showed unchanged LVEF, 30-35%. He is improving somewhat in his mental status and can speak but has no comprehension and doesn't follow commands. I reviewed his echo images in office today and he has scar / akinesis in the LAD territory.     He is undergoing PT and has been told BP too low to do PT without cardiology clearance. He has no syncope or dizziness, only fatigued at the end of a long session.      ASSESSMENT/PLAN:     This is a 41 year old male s/p cardiac arrest (s/p successful aspiration thrombectomy and PCI w/ NAZIA of proximal and mid LAD) with chronic encephalopathy with ischemic cardiomyopathy here for follow up. He is now DNR.     We discussed (his father and I) long term management which includes medical optimization, monitoring for residual cardiac dysfunction, BP and HR control and reconditioning of the heart. His father is under a lot of stress right as caregiver as he has PD and pandemic has been hard for them. We discussed ICD therapy which does not seem in line with his goals as DNR. He is off metoprolol as well now due to intolerance from low blood pressure. He is  on midodrine. On physical exam he has no signs of congestive heart failure and his extremities are warm suggesting adequate perfusion. I have no concerns for him to partake in PT/OT unless BP drops below 85/45 mmHg, but even then he can take midodrine and salt tab with glass of water so that he may partake in PT session which is important for his health and cardiac function. I do not want PT to be canceled regularly due to blood pressure.     For PCI history, can stop Brilinta and continue aspirin. No need for repeat echocardiogram yearly unless change in symptoms or clinical condition.     Follow up in 12 months.     Bibi Marcus MD MSc  [facility]          PAST MEDICAL HISTORY  Past Medical History:   Diagnosis Date     Acute respiratory failure with hypoxia (H)      Aggression 11/09/2020     Agitation 09/21/2020     LOWELL (acute kidney injury) (H)      Back injury, sequela 10/27/2017     Cardiac arrest (H) 05/17/2020     Cardiac arrest (H)      Cognitive disorder 11/11/2020     Depressive disorder      Dysphagia 11/11/2020     Dysphagia      Gastrojejunostomy tube status (H) 11/11/2020     HTN (hypertension)      Hypoxic ischemic encephalopathy      Low blood pressure 09/17/2020     Nonverbal 11/11/2020     NSTEMI (non-ST elevated myocardial infarction) (H)      TBI (traumatic brain injury) (H) 06/25/2020       CURRENT MEDICATIONS  Current Outpatient Medications   Medication Sig Dispense Refill     acetaminophen (TYLENOL) 325 MG tablet Take 1 tablet (325 mg) by mouth every 6 hours as needed for mild pain or fever 90 tablet 0     ASPIRIN LOW DOSE 81 MG chewable tablet TAKE ONE TABLET PER G TUBE EVERY DAY 28 tablet 2     atorvastatin (LIPITOR) 40 MG tablet TAKE ONE TABLET PER G TUBE EVERY NIGHT AT BEDTIME 30 tablet 3     docusate sodium (COLACE) 100 MG capsule Take 1 capsule (100 mg) by mouth 2 times daily as needed for constipation 180 capsule 0     Emollient (AQUAPHOR ADVANCED THERAPY) OINT Apply  topically 2 times daily As needed. 20 g 1     hydrOXYzine (ATARAX) 10 MG tablet Take 1 tablet (10 mg) by mouth daily as needed for anxiety (30-60 minutes before appointments) 30 tablet 3     LORazepam (ATIVAN) 0.5 MG tablet Take 1 tablet (0.5 mg) by mouth 2 times daily 60 tablet 1     midodrine (PROAMATINE) 2.5 MG tablet 1 tablet (2.5 mg) by Oral or Feeding Tube route 3 times daily (with meals) 60 tablet 0     Neomycin-Bacitracin-Polymyxin (TRIPLE ANTIBIOTIC) 3.5-400-5000 OINT ointment Externally apply topically 2 times daily as needed       nystatin (MYCOSTATIN) 566383 UNIT/GM external cream Apply topically 2 times daily As needed 15 g 1     PARoxetine (PAXIL) 30 MG tablet Take 1 tablet (30 mg) by mouth every morning 30 tablet 3     ticagrelor (BRILINTA) 90 MG tablet Take 1 tablet (90 mg) by mouth 2 times daily 180 tablet 3     Valproate Sodium (VALPROIC ACID) 250 MG/5ML Take 10 mLs (500 mg) by mouth 2 times daily 600 mL 1       PAST SURGICAL HISTORY:  Past Surgical History:   Procedure Laterality Date     APPENDECTOMY       CARDIAC CATHETERIZATION  05/17/2020    Procedure: Coronary Angiogram; Surgeon: Chadd Newby MD; Location: [facility] CARDIAC CATH LAB       CV CORONARY ANGIOGRAM N/A 5/17/2020    Procedure: Coronary Angiogram;  Surgeon: Chadd Newby MD;  Location: [facility] CARDIAC CATH LAB     CV EXTRACORPERAL MEMBRANE OXYGENATION N/A 5/17/2020    Procedure: Extracorporeal Membrance Oxygenation;  Surgeon: Chadd Newby MD;  Location: [facility] CARDIAC CATH LAB     CV INTRA AORTIC BALLOON N/A 5/17/2020    Procedure: Intra-Aortic Balloon Pump Insertion;  Surgeon: Chadd Newby MD;  Location: [facility] CARDIAC CATH LAB     CV PCI STENT DRUG ELUTING N/A 5/17/2020    Procedure: Percutaneous Coronary Intervention Stent Drug Eluting;  Surgeon: Chadd Newby MD;  Location: [facility] CARDIAC CATH LAB     G TUBE REPLACEMENT  8/19/2020     G TUBE REPLACEMENT  3/10/2021     G TUBE REPLACEMENT   5/20/2021     IR GASTROSTOMY TUBE CHANGE  8/19/2020     IR GASTROSTOMY TUBE CHANGE  3/10/2021     IR GASTROSTOMY TUBE CHANGE  5/20/2021     IR GASTROSTOMY TUBE PERCUTANEOUS PLCMNT  6/9/2020     OTHER SURGICAL HISTORY  05/17/2020    CV PCI STENT DRUG ELUTINGProcedure: Percutaneous Coronary Intervention Stent Drug Eluting; Surgeon: Chadd Newby MD; Location:  HEART CARDIAC CATH LAB       OTHER SURGICAL HISTORY  05/17/2020    CV INTRA AORTIC BALLON PUMP INSERTION Procedure: Intra-Aortic Balloon Pump Insertion; Surgeon: Chadd Newby MD; Location:  HEART CARDIAC CATH LAB       OTHER SURGICAL HISTORY  05/19/2020    REMOVE EXTRACORPOREAL MEMBRANE OXYGENATOR Procedure: ECMO Decannulation at Bedside; Surgeon: Mariano Workman MD; Location:  OR       OTHER SURGICAL HISTORY  05/17/2020    CV EXTRACORPOREAL MEMBRANE OXGENATIONProcedure: Extracorporeal Membrance Oxygenation; Surgeon: Chadd Newby MD; Location:  HEART CARDIAC CATH LAB       PEG TUBE PLACEMENT  06/09/2020     REMOVE EXTRACORPORAL MEMBRANE OXYGENATOR ADULT (OUTSIDE OR) N/A 5/19/2020    Procedure: ECMO Decannulation at Bedside;  Surgeon: Mariano Workman MD;  Location:  OR     [facility] APPENDECTOMY      Description: Appendectomy;  Recorded: 08/25/2014;  Comments: age 11 or 12       ALLERGIES   No Known Allergies    FAMILY HISTORY  Family History   Problem Relation Age of Onset     Parkinsonism Father      Diabetes Maternal Aunt      Diabetes Type 1 Maternal Aunt      Other - See Comments Father         spinal stenosis      Lung Cancer Maternal Grandfather      Cancer Maternal Grandfather        SOCIAL HISTORY  Social History     Socioeconomic History     Marital status: Single     Spouse name: Not on file     Number of children: Not on file     Years of education: Not on file     Highest education level: Not on file   Occupational History     Not on file   Tobacco Use     Smoking status: Former Smoker     Packs/day: 0.00      Years: 0.00     Pack years: 0.00     Start date: 1995     Quit date: 2020     Years since quittin.7     Smokeless tobacco: Never Used   Substance and Sexual Activity     Alcohol use: Not Currently     Drug use: Not Currently     Sexual activity: Not Currently   Other Topics Concern     Parent/sibling w/ CABG, MI or angioplasty before 65F 55M? Not Asked   Social History Narrative     Not on file     Social Determinants of Health     Financial Resource Strain: Low Risk      Difficulty of Paying Living Expenses: Not hard at all   Food Insecurity: No Food Insecurity     Worried About Running Out of Food in the Last Year: Never true     Ran Out of Food in the Last Year: Never true   Transportation Needs: No Transportation Needs     Lack of Transportation (Medical): No     Lack of Transportation (Non-Medical): No   Physical Activity: Inactive     Days of Exercise per Week: 0 days     Minutes of Exercise per Session: 0 min   Stress: Not on file   Social Connections: Unknown     Frequency of Communication with Friends and Family: Never     Frequency of Social Gatherings with Friends and Family: Three times a week     Attends Faith Services: Not on file     Active Member of Clubs or Organizations: No     Attends Club or Organization Meetings: Never     Marital Status: Not on file   Intimate Partner Violence: Not on file   Housing Stability: Not on file         EXAM:  BP 99/62   Pulse 53   Wt 81.6 kg (180 lb)   SpO2 100%   BMI 27.37 kg/m      HEENT: NC/AT.  RICARDO.  EOMI.  Neck: No adenopathy.  No thyromegaly. Carotids +2/2 bilaterally without bruits.  No jugular venous distension.   Heart: RRR. Normal S1, S2 splits physiologically. No murmur, rub, click, or gallop.  Lungs: CTA.  No ronchi, wheezes, rales.  Abdomen: Soft, nontender, nondistended.   Extremities: No clubbing, cyanosis, or edema.    Vascular: No bruits are noted.    Labs:  LIPID RESULTS:  Lab Results   Component Value Date    CHOL 100  11/16/2021    CHOL 170 06/12/2020    HDL 39 (L) 11/16/2021    HDL 25 (L) 06/12/2020    LDL 27 11/16/2021    LDL 94 06/12/2020    TRIG 170 (H) 11/16/2021    TRIG 255 (H) 06/12/2020    NHDL 61 11/16/2021    NHDL 145 (H) 06/12/2020       LIVER ENZYME RESULTS:  Lab Results   Component Value Date    AST 22 01/17/2022    AST 11 04/26/2021    ALT 31 01/17/2022    ALT 27 04/26/2021       CBC RESULTS:  Lab Results   Component Value Date    WBC 9.5 01/17/2022    WBC 7.7 04/26/2021    RBC 4.34 (L) 01/17/2022    RBC 4.17 (L) 04/26/2021    HGB 13.2 (L) 01/17/2022    HGB 13.1 (L) 04/26/2021    HCT 40.4 01/17/2022    HCT 41.3 04/26/2021    MCV 93 01/17/2022    MCV 99 04/26/2021    MCH 30.4 01/17/2022    MCH 31.4 04/26/2021    MCHC 32.7 01/17/2022    MCHC 31.7 04/26/2021    RDW 13.6 01/17/2022    RDW 12.7 04/26/2021     01/17/2022     04/26/2021       BMP RESULTS:  Lab Results   Component Value Date     01/17/2022     06/25/2020    POTASSIUM 4.7 01/17/2022    POTASSIUM 3.8 06/25/2020    CHLORIDE 104 01/17/2022    CHLORIDE 102 06/25/2020    CO2 27 01/17/2022    CO2 24 06/25/2020    ANIONGAP 4 01/17/2022    ANIONGAP 13 06/25/2020    GLC 92 01/17/2022    GLC 98 06/25/2020    BUN 17 01/17/2022    BUN 29 06/25/2020    CR 0.74 01/17/2022    CR 0.76 06/25/2020    GFRESTIMATED >90 01/17/2022    GFRESTIMATED >60 12/24/2020    GFRESTIMATED >90 06/25/2020    GFRESTBLACK >60 12/24/2020    GFRESTBLACK >90 06/25/2020    TEN 8.9 01/17/2022    TEN 9.7 06/25/2020        A1C RESULTS:  Lab Results   Component Value Date    A1C 5.2 05/25/2020       Thank you for allowing me to participate in the care of your patient.      Sincerely,     Bibi Marcus MD     [facility]  cc:   Bibi Marcus MD  [address] [place] [id number]

## 2022-02-08 NOTE — PROGRESS NOTES
HPI:     This is a 41 year old male here with his father who is primary caregiver and mother has joined as well. He has PMH Hypoxic Brain Injury secondary to VF arrest with persistent moderate-severe encephalopathy, TBI, nonverbal, respiratory failure requiring trach placement, dysphagia (on tube feeding), cardiac arrest (s/p successful aspiration thrombectomy and PCI w/ NAZIA of proximal and mid LAD). He has ischemic cardiomyopathy with EF 30-35%. He is here for follow up.    From prior visit:    Hospitalized 11/11/20-11/27/20 at [facility] for increased agitation: infectious source negative. Neurology consulted and medications switched around. 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 Admission at [facility] 6/25/20-7/31/20. His initial episode was in May 2020.      Per his records:     Hospitalized 5/17/20-6/25/20 for cardiac arrest: Pt reportedly had chest pain that started on 5/16/20. He was using Drano on his pipes at home and did not initially seek medical attention for CP and SOB since it said on the box that Drano can cause those symptoms. He took a shower and had syncopal episode after coming out of the shower. EMS was called; initial rhythm asystole,after several rounds CPR, --> PEA --> VF in the field. After multiple cycles of epinephrine had ROSC. He was intubated in the field. BP was 90s/60s as he was being transported from ED but he had recurrent cardiac arrest on arrival to Cath Lab. ECG showed ST elevation in aVR. He was promptly placed on VA ECMO. He had thrombotic occlusion of proximal LAD and underwent successful aspiration thrombectomy and PCI w/ NAZIA of proximal and mid LAD. Patient remained in NSR with rates in the 110-120's. Suspect ongoing tachycardia d/t evolving MI and post-arrest state. TTE 6/10 showed LVEF 36% with LAD territory akinesis, RV normal. Patient now on GDMT. 5/27, pt had 23 second run of VT, now ST  with rare PVCs. ECMO 5/17-5/19, IABP 5/19-5/20, trach 6/8, PEG 6/9.     Has speech, physical and occupational therapy. Blood pressure has been low and occasional tachycardia. He is on low dose metoprolol and lisinopril which had to be stopped. He was started on midodrine three times a day.      Last echocardiogram reviewed:      Interpretation Summary  LVEF 36% based on biplane 2D tracing.  LAD territory akinesis.  Right ventricular function, chamber size, wall motion, and thickness are  normal.  The inferior vena cava is normal.  No pericardial effusion is present.  There has been no change.     We repeated echocardiogram which showed unchanged LVEF, 30-35%. He is improving somewhat in his mental status and can speak but has no comprehension and doesn't follow commands. I reviewed his echo images in office today and he has scar / akinesis in the LAD territory.     He is undergoing PT and has been told BP too low to do PT without cardiology clearance. He has no syncope or dizziness, only fatigued at the end of a long session.      ASSESSMENT/PLAN:     This is a 41 year old male s/p cardiac arrest (s/p successful aspiration thrombectomy and PCI w/ NAZIA of proximal and mid LAD) with chronic encephalopathy with ischemic cardiomyopathy here for follow up. He is now DNR.     We discussed (his father and I) long term management which includes medical optimization, monitoring for residual cardiac dysfunction, BP and HR control and reconditioning of the heart. His father is under a lot of stress right as caregiver as he has PD and pandemic has been hard for them. We discussed ICD therapy which does not seem in line with his goals as DNR. He is off metoprolol as well now due to intolerance from low blood pressure. He is on midodrine. On physical exam he has no signs of congestive heart failure and his extremities are warm suggesting adequate perfusion. I have no concerns for him to partake in PT/OT unless BP drops below 85/45  mmHg, but even then he can take midodrine and salt tab with glass of water so that he may partake in PT session which is important for his health and cardiac function. I do not want PT to be canceled regularly due to blood pressure.     For PCI history, can stop Brilinta and continue aspirin. No need for repeat echocardiogram yearly unless change in symptoms or clinical condition.     Follow up in 12 months.     Bibi Marcus MD MSc  [facility]          PAST MEDICAL HISTORY  Past Medical History:   Diagnosis Date     Acute respiratory failure with hypoxia (H)      Aggression 11/09/2020     Agitation 09/21/2020     LOWELL (acute kidney injury) (H)      Back injury, sequela 10/27/2017     Cardiac arrest (H) 05/17/2020     Cardiac arrest (H)      Cognitive disorder 11/11/2020     Depressive disorder      Dysphagia 11/11/2020     Dysphagia      Gastrojejunostomy tube status (H) 11/11/2020     HTN (hypertension)      Hypoxic ischemic encephalopathy      Low blood pressure 09/17/2020     Nonverbal 11/11/2020     NSTEMI (non-ST elevated myocardial infarction) (H)      TBI (traumatic brain injury) (H) 06/25/2020       CURRENT MEDICATIONS  Current Outpatient Medications   Medication Sig Dispense Refill     acetaminophen (TYLENOL) 325 MG tablet Take 1 tablet (325 mg) by mouth every 6 hours as needed for mild pain or fever 90 tablet 0     ASPIRIN LOW DOSE 81 MG chewable tablet TAKE ONE TABLET PER G TUBE EVERY DAY 28 tablet 2     atorvastatin (LIPITOR) 40 MG tablet TAKE ONE TABLET PER G TUBE EVERY NIGHT AT BEDTIME 30 tablet 3     docusate sodium (COLACE) 100 MG capsule Take 1 capsule (100 mg) by mouth 2 times daily as needed for constipation 180 capsule 0     Emollient (AQUAPHOR ADVANCED THERAPY) OINT Apply topically 2 times daily As needed. 20 g 1     hydrOXYzine (ATARAX) 10 MG tablet Take 1 tablet (10 mg) by mouth daily as needed for anxiety (30-60 minutes before appointments) 30 tablet 3     LORazepam (ATIVAN) 0.5  MG tablet Take 1 tablet (0.5 mg) by mouth 2 times daily 60 tablet 1     midodrine (PROAMATINE) 2.5 MG tablet 1 tablet (2.5 mg) by Oral or Feeding Tube route 3 times daily (with meals) 60 tablet 0     Neomycin-Bacitracin-Polymyxin (TRIPLE ANTIBIOTIC) 3.5-400-5000 OINT ointment Externally apply topically 2 times daily as needed       nystatin (MYCOSTATIN) 826336 UNIT/GM external cream Apply topically 2 times daily As needed 15 g 1     PARoxetine (PAXIL) 30 MG tablet Take 1 tablet (30 mg) by mouth every morning 30 tablet 3     ticagrelor (BRILINTA) 90 MG tablet Take 1 tablet (90 mg) by mouth 2 times daily 180 tablet 3     Valproate Sodium (VALPROIC ACID) 250 MG/5ML Take 10 mLs (500 mg) by mouth 2 times daily 600 mL 1       PAST SURGICAL HISTORY:  Past Surgical History:   Procedure Laterality Date     APPENDECTOMY       CARDIAC CATHETERIZATION  05/17/2020    Procedure: Coronary Angiogram; Surgeon: Chadd Newby MD; Location: [facility] CARDIAC CATH LAB       CV CORONARY ANGIOGRAM N/A 5/17/2020    Procedure: Coronary Angiogram;  Surgeon: Chadd Newby MD;  Location: [facility] CARDIAC CATH LAB     CV EXTRACORPERAL MEMBRANE OXYGENATION N/A 5/17/2020    Procedure: Extracorporeal Membrance Oxygenation;  Surgeon: Chadd Newby MD;  Location: [facility] CARDIAC CATH LAB     CV INTRA AORTIC BALLOON N/A 5/17/2020    Procedure: Intra-Aortic Balloon Pump Insertion;  Surgeon: Chadd Newby MD;  Location: [facility] CARDIAC CATH LAB     CV PCI STENT DRUG ELUTING N/A 5/17/2020    Procedure: Percutaneous Coronary Intervention Stent Drug Eluting;  Surgeon: Chadd Newby MD;  Location: [facility] CARDIAC CATH LAB     G TUBE REPLACEMENT  8/19/2020     G TUBE REPLACEMENT  3/10/2021     G TUBE REPLACEMENT  5/20/2021     IR GASTROSTOMY TUBE CHANGE  8/19/2020     IR GASTROSTOMY TUBE CHANGE  3/10/2021     IR GASTROSTOMY TUBE CHANGE  5/20/2021     IR GASTROSTOMY TUBE PERCUTANEOUS PLCMNT  6/9/2020     OTHER SURGICAL HISTORY   2020    CV PCI STENT DRUG ELUTINGProcedure: Percutaneous Coronary Intervention Stent Drug Eluting; Surgeon: Chadd Newby MD; Location:  HEART CARDIAC CATH LAB       OTHER SURGICAL HISTORY  2020    CV INTRA AORTIC BALLON PUMP INSERTION Procedure: Intra-Aortic Balloon Pump Insertion; Surgeon: Chadd Newby MD; Location:  HEART CARDIAC CATH LAB       OTHER SURGICAL HISTORY  2020    REMOVE EXTRACORPOREAL MEMBRANE OXYGENATOR Procedure: ECMO Decannulation at Bedside; Surgeon: Mariano Workman MD; Location:  OR       OTHER SURGICAL HISTORY  2020    CV EXTRACORPOREAL MEMBRANE OXGENATIONProcedure: Extracorporeal Membrance Oxygenation; Surgeon: Chadd Newby MD; Location:  HEART CARDIAC CATH LAB       PEG TUBE PLACEMENT  2020     REMOVE EXTRACORPORAL MEMBRANE OXYGENATOR ADULT (OUTSIDE OR) N/A 2020    Procedure: ECMO Decannulation at Bedside;  Surgeon: Mariano Workman MD;  Location:  OR     [facility] APPENDECTOMY      Description: Appendectomy;  Recorded: 2014;  Comments: age 11 or 12       ALLERGIES   No Known Allergies    FAMILY HISTORY  Family History   Problem Relation Age of Onset     Parkinsonism Father      Diabetes Maternal Aunt      Diabetes Type 1 Maternal Aunt      Other - See Comments Father         spinal stenosis      Lung Cancer Maternal Grandfather      Cancer Maternal Grandfather        SOCIAL HISTORY  Social History     Socioeconomic History     Marital status: Single     Spouse name: Not on file     Number of children: Not on file     Years of education: Not on file     Highest education level: Not on file   Occupational History     Not on file   Tobacco Use     Smoking status: Former Smoker     Packs/day: 0.00     Years: 0.00     Pack years: 0.00     Start date: 1995     Quit date: 2020     Years since quittin.7     Smokeless tobacco: Never Used   Substance and Sexual Activity     Alcohol use: Not Currently      Drug use: Not Currently     Sexual activity: Not Currently   Other Topics Concern     Parent/sibling w/ CABG, MI or angioplasty before 65F 55M? Not Asked   Social History Narrative     Not on file     Social Determinants of Health     Financial Resource Strain: Low Risk      Difficulty of Paying Living Expenses: Not hard at all   Food Insecurity: No Food Insecurity     Worried About Running Out of Food in the Last Year: Never true     Ran Out of Food in the Last Year: Never true   Transportation Needs: No Transportation Needs     Lack of Transportation (Medical): No     Lack of Transportation (Non-Medical): No   Physical Activity: Inactive     Days of Exercise per Week: 0 days     Minutes of Exercise per Session: 0 min   Stress: Not on file   Social Connections: Unknown     Frequency of Communication with Friends and Family: Never     Frequency of Social Gatherings with Friends and Family: Three times a week     Attends Buddhist Services: Not on file     Active Member of Clubs or Organizations: No     Attends Club or Organization Meetings: Never     Marital Status: Not on file   Intimate Partner Violence: Not on file   Housing Stability: Not on file         EXAM:  BP 99/62   Pulse 53   Wt 81.6 kg (180 lb)   SpO2 100%   BMI 27.37 kg/m      HEENT: NC/AT.  PERRLA.  EOMI.  Neck: No adenopathy.  No thyromegaly. Carotids +2/2 bilaterally without bruits.  No jugular venous distension.   Heart: RRR. Normal S1, S2 splits physiologically. No murmur, rub, click, or gallop.  Lungs: CTA.  No ronchi, wheezes, rales.  Abdomen: Soft, nontender, nondistended.   Extremities: No clubbing, cyanosis, or edema.    Vascular: No bruits are noted.    Labs:  LIPID RESULTS:  Lab Results   Component Value Date    CHOL 100 11/16/2021    CHOL 170 06/12/2020    HDL 39 (L) 11/16/2021    HDL 25 (L) 06/12/2020    LDL 27 11/16/2021    LDL 94 06/12/2020    TRIG 170 (H) 11/16/2021    TRIG 255 (H) 06/12/2020    NHDL 61 11/16/2021    NHDL 145 (H)  06/12/2020       LIVER ENZYME RESULTS:  Lab Results   Component Value Date    AST 22 01/17/2022    AST 11 04/26/2021    ALT 31 01/17/2022    ALT 27 04/26/2021       CBC RESULTS:  Lab Results   Component Value Date    WBC 9.5 01/17/2022    WBC 7.7 04/26/2021    RBC 4.34 (L) 01/17/2022    RBC 4.17 (L) 04/26/2021    HGB 13.2 (L) 01/17/2022    HGB 13.1 (L) 04/26/2021    HCT 40.4 01/17/2022    HCT 41.3 04/26/2021    MCV 93 01/17/2022    MCV 99 04/26/2021    MCH 30.4 01/17/2022    MCH 31.4 04/26/2021    MCHC 32.7 01/17/2022    MCHC 31.7 04/26/2021    RDW 13.6 01/17/2022    RDW 12.7 04/26/2021     01/17/2022     04/26/2021       BMP RESULTS:  Lab Results   Component Value Date     01/17/2022     06/25/2020    POTASSIUM 4.7 01/17/2022    POTASSIUM 3.8 06/25/2020    CHLORIDE 104 01/17/2022    CHLORIDE 102 06/25/2020    CO2 27 01/17/2022    CO2 24 06/25/2020    ANIONGAP 4 01/17/2022    ANIONGAP 13 06/25/2020    GLC 92 01/17/2022    GLC 98 06/25/2020    BUN 17 01/17/2022    BUN 29 06/25/2020    CR 0.74 01/17/2022    CR 0.76 06/25/2020    GFRESTIMATED >90 01/17/2022    GFRESTIMATED >60 12/24/2020    GFRESTIMATED >90 06/25/2020    GFRESTBLACK >60 12/24/2020    GFRESTBLACK >90 06/25/2020    TEN 8.9 01/17/2022    TEN 9.7 06/25/2020        A1C RESULTS:  Lab Results   Component Value Date    A1C 5.2 05/25/2020

## 2022-02-09 NOTE — TELEPHONE ENCOUNTER
"Refill request r'cd from [facility] via fax for Valproic Acid denied due to Refill too soon & pt has upcoming appt. Faxed \"not authorized\" back to pharmacy.    Diana Jason RN February 9, 2022 11:13 AM    "

## 2022-02-15 ENCOUNTER — TELEPHONE (OUTPATIENT)
Dept: CARDIOLOGY | Facility: CLINIC | Age: 42
End: 2022-02-15
Payer: COMMERCIAL

## 2022-02-15 NOTE — TELEPHONE ENCOUNTER
[facility]    Phone Message    May a detailed message be left on voicemail: yes     Reason for Call: Other: Pt dad would like a call back to discuss low BP and would like a clal back asap     Action Taken: Message routed to:  [facility] Cardio    Travel Screening: Not Applicable

## 2022-02-15 NOTE — TELEPHONE ENCOUNTER
"Spoke with patient's father. Patient's father states that the patient's BP has been \"all over the place.\" Patient's father states that he has been participating in PT, and PT is concerned about the varying BP responses they are getting. During PT on 2/11, patient's BP was lower, 85/51 while sitting, and then once they got him up standing, his BP was 141/115. They then sat him back in a chair and it went down to 88/49. Patient also participated in PT on 2/14, and his BP was 85/55 while sitting, and then dropped to 82/51 after 3 minutes of standing. Patient's father states that PT is too concerned about his varying BP readings to continue. Inquired if patient is taking midodrine and salt tabs prior to PT session as Dr. Marcus recommended, and patient's father was unsure. Patient's father states that the bottom line is that PT is too concerned about his BP ranges. Will route to Dr. Marcus for review.   "

## 2022-02-16 ENCOUNTER — VIRTUAL VISIT (OUTPATIENT)
Dept: PSYCHIATRY | Facility: CLINIC | Age: 42
End: 2022-02-16
Payer: COMMERCIAL

## 2022-02-16 ENCOUNTER — VIRTUAL VISIT (OUTPATIENT)
Dept: BEHAVIORAL HEALTH | Facility: CLINIC | Age: 42
End: 2022-02-16
Payer: COMMERCIAL

## 2022-02-16 DIAGNOSIS — G25.9 EXTRAPYRAMIDAL RIGIDITY: ICD-10-CM

## 2022-02-16 DIAGNOSIS — F34.81 DISRUPTIVE MOOD DYSREGULATION DISORDER (H): Primary | ICD-10-CM

## 2022-02-16 DIAGNOSIS — F09 COGNITIVE DISORDER: ICD-10-CM

## 2022-02-16 DIAGNOSIS — F32.89 OTHER SPECIFIED DEPRESSIVE EPISODES: Primary | ICD-10-CM

## 2022-02-16 PROCEDURE — 99213 OFFICE O/P EST LOW 20 MIN: CPT | Mod: 95 | Performed by: NURSE PRACTITIONER

## 2022-02-16 PROCEDURE — 90791 PSYCH DIAGNOSTIC EVALUATION: CPT | Mod: 95 | Performed by: COUNSELOR

## 2022-02-16 RX ORDER — LORAZEPAM 0.5 MG/1
0.25 TABLET ORAL
Qty: 30 TABLET | Refills: 1 | Status: SHIPPED | OUTPATIENT
Start: 2022-02-16 | End: 2022-04-22

## 2022-02-16 ASSESSMENT — ANXIETY QUESTIONNAIRES
2. NOT BEING ABLE TO STOP OR CONTROL WORRYING: MORE THAN HALF THE DAYS
7. FEELING AFRAID AS IF SOMETHING AWFUL MIGHT HAPPEN: NOT AT ALL
GAD7 TOTAL SCORE: 12
7. FEELING AFRAID AS IF SOMETHING AWFUL MIGHT HAPPEN: NOT AT ALL
1. FEELING NERVOUS, ANXIOUS, OR ON EDGE: MORE THAN HALF THE DAYS
3. WORRYING TOO MUCH ABOUT DIFFERENT THINGS: MORE THAN HALF THE DAYS
5. BEING SO RESTLESS THAT IT IS HARD TO SIT STILL: MORE THAN HALF THE DAYS
GAD7 TOTAL SCORE: 12
4. TROUBLE RELAXING: MORE THAN HALF THE DAYS
6. BECOMING EASILY ANNOYED OR IRRITABLE: MORE THAN HALF THE DAYS
GAD7 TOTAL SCORE: 12
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
## 2022-02-16 NOTE — PROGRESS NOTES
"Attempted to meet with Scot and his family via video chat but was unable to get connected due to technical difficulties.  Scot's father agreed to a telephone visit  Time spent: 30 minutes    Answers for HPI/ROS submitted by the patient on 2022  CLEMENT 7 TOTAL SCORE: 12          Outpatient Psychiatric Progress Note    Name: Scot Bishop   : 1980                    Primary Care Provider: Cachorro Morales MD   Therapist: None    PHQ-9 scores:  PHQ-9 SCORE 11/15/2021   PHQ-9 Total Score MyChart 4 (Minimal depression)   PHQ-9 Total Score 4       CLEMENT-7 scores:  CLEMENT-7 SCORE 2022   Total Score 12 (moderate anxiety)   Total Score 12       Patient Identification:    Patient is a 41 year old year old, single  White Not  or  male  who presents for return visit with me.  Patient is currently disabled. Patient attended the session with His father, stepmother, and care team staff member , who they agreed to have interview with. Patient prefers to be called: \" Scot\".     Current medications include: acetaminophen (TYLENOL) 325 MG tablet, Take 1 tablet (325 mg) by mouth every 6 hours as needed for mild pain or fever  ASPIRIN LOW DOSE 81 MG chewable tablet, TAKE ONE TABLET PER G TUBE EVERY DAY  atorvastatin (LIPITOR) 40 MG tablet, TAKE ONE TABLET PER G TUBE EVERY NIGHT AT BEDTIME  docusate sodium (COLACE) 100 MG capsule, Take 1 capsule (100 mg) by mouth 2 times daily as needed for constipation  Emollient (AQUAPHOR ADVANCED THERAPY) OINT, Apply topically 2 times daily As needed.  hydrOXYzine (ATARAX) 10 MG tablet, Take 1 tablet (10 mg) by mouth daily as needed for anxiety (30-60 minutes before appointments)  LORazepam (ATIVAN) 0.5 MG tablet, Take 1 tablet (0.5 mg) by mouth 2 times daily  midodrine (PROAMATINE) 2.5 MG tablet, 1 tablet (2.5 mg) by Oral or Feeding Tube route 3 times daily (with meals)  Neomycin-Bacitracin-Polymyxin (TRIPLE ANTIBIOTIC) 3.5-400-5000 OINT ointment, Externally apply " topically 2 times daily as needed  nystatin (MYCOSTATIN) 973533 UNIT/GM external cream, Apply topically 2 times daily As needed  PARoxetine (PAXIL) 30 MG tablet, Take 1 tablet (30 mg) by mouth every morning  sodium chloride 1 GM tablet, Take 1 tablet (1 g) by mouth daily as needed (for low blood pressure before PT session)  Valproate Sodium (VALPROIC ACID) 250 MG/5ML, Take 10 mLs (500 mg) by mouth 2 times daily    No current facility-administered medications on file prior to visit.       The [place] Prescription Monitoring Program has been reviewed and there are no concerns about diversionary activity for controlled substances at this time.      I was able to review most recent Primary Care Provider, specialty provider, and therapy visit notes that I have access to.     Today, Scot is reported to have gone to the hospital for a few days.  While there he had some medication changes, being taken off of metoprolol.  He continues to have difficulties engaging in physical therapy exercises.  When going out in the car, he has not been taking the car seat.  His sleep is reportedly going well.  Scot is nonverbal during this visit     has a past medical history of Acute respiratory failure with hypoxia (H), Aggression (11/09/2020), Agitation (09/21/2020), LOWELL (acute kidney injury) (H), Back injury, sequela (10/27/2017), Cardiac arrest (H) (05/17/2020), Cardiac arrest (H), Cognitive disorder (11/11/2020), Depressive disorder, Dysphagia (11/11/2020), Dysphagia, Gastrojejunostomy tube status (H) (11/11/2020), HTN (hypertension), Hypoxic ischemic encephalopathy, Low blood pressure (09/17/2020), Nonverbal (11/11/2020), NSTEMI (non-ST elevated myocardial infarction) (H), and TBI (traumatic brain injury) (H) (06/25/2020).    Social history updates:    No changes in Scot's social history to report    Substance use updates:    No alcohol use  Tobacco use: No    Vital Signs:   There were no vitals taken for this  visit.    Labs:    Most recent laboratory results reviewed: Gram-positive resembling diptheroids    Mental Status Examination:  Appearance:  Unable to assess  Attitude:  Unable to assess   Eye Contact:  Unable to assess  Gait and Station: No dizziness or falls and Uses wheelchair  Psychomotor Behavior:  Unable to assess  Oriented to:  Person  Attention Span and Concentration:  Poor  Speech:   mute  Mood:  Unable to assess  Affect:  Calm  Associations:  Unable to assess  Thought Process:  [place]  Thought Content:  no evidence of suicidal ideation or homicidal ideation  Recent and Remote Memory:  poor Not formally assessed. No amnesia.  Fund of Knowledge: low-normal  Insight:  For  Judgment:  poor  Impulse Control:  poor    Suicide Risk Assessment:  Today Scot Bishop has not reportedly made any attempts to want to end his life or to and other peoples lives. In addition, there are notable risk factors for self-harm, including single status, comorbid medical condition of Traumatic brain injury and mood change. However, risk is mitigated by 24-hour supervision in his residence. Therefore, based on all available evidence including the factors cited above, Scot Bishop does not appear to be at imminent risk for self-harm, does not meet criteria for a 72-hr hold, and therefore remains appropriate for ongoing outpatient level of care.  A thorough assessment of risk factors related to suicide and self-harm have been reviewed and are noted above. The patient convincingly denies suicidality on several occasions. Local community safety resources printed and reviewed for patient to use if needed. There was no deceit detected, and the patient presented in a manner that was believable.     DSM5 Diagnosis:  296.99 (F34.8) Disruptive Mood Dysregulation Disorder    Medical comorbidities include:   Patient Active Problem List    Diagnosis Date Noted     Hypotension, unspecified hypotension type 01/17/2022     Priority:  Medium     CARDIOVASCULAR SCREENING; LDL GOAL LESS THAN 100 11/16/2021     Priority: Medium     Nicotine dependence 04/16/2021     Priority: Medium     Formatting of this note might be different from the original.  Created by Conversion       Thrombocytopenia, unspecified (H) 04/16/2021     Priority: Medium     Thrush, oral 12/09/2020     Priority: Medium     Advance care planning 12/04/2020     Priority: Medium     Formatting of this note might be different from the original.  Community Palliative Care was asked to see patient by inpatient palliative care on 11/23 for continuation of palliative care in a new setting.  Date of last visit: 12/9/2020    Formatting of this note is different from the original.  Advance Care Planning   Patient has identified Health Care Agent(s): Yes  Add Health Care Agents: Yes    Health Care Agent(s):  Guardian: Gerald Bishop Relationship: father Phone: [phone]     Patient has Advance Care Plan Documents (Health Care Directive, POLST): Yes    Advance Care Plan Documents:  POLST Form     Patient has identified Specific Treatment Preferences: Yes     How have preferences been verified: POLST    Specific Treatment Preferences:   a.) Code Status:  DNR/ Do Not Attempt Resuscitation - Allow a Natural Death  b.) Goals of Treatment:   ii. Selective Treatment: Use medical treatment, antibiotics, IV fluids and cardiac monitor as indicated. No intubation, advanced airway interventions, or mechanical ventilation. May consider less invasive airway support (e.g. CPAP, BiPAP). Transfer to hospital if indicated. Generally avoid the intensive care unit. All patients will receive comfort-focused treatments.  TREATMENT PLAN: Provide basic medical treatments aimed at treating new or reversible illness.  c.) Interventions and Treatments:  i.  Artificially Administered Nutrition and Hydration: - Long term artificial nutrion/hydration by tube through (not specified) (specify mouth/nose) and (not  specified) (specify if ok with directly into GI tract)  ii.  Antibiotics: - Oral antibiotics only (NO IV/IM)       Acute respiratory failure with hypoxia (H) 12/04/2020     Priority: Medium     Gastrojejunostomy tube status (H) 11/11/2020     Priority: Medium     Cognitive disorder 11/11/2020     Priority: Medium     Aggression 11/09/2020     Priority: Medium     Agitation 09/21/2020     Priority: Medium     G tube feedings (H) 09/03/2020     Priority: Medium     Dysphagia 08/04/2020     Priority: Medium     Traumatic brain injury (H) 06/25/2020     Priority: Medium     Cardiac arrest (H) 05/17/2020     Priority: Medium     Back injury, sequela 10/27/2017     Priority: Medium     Formatting of this note might be different from the original.  WC Case w/ [employer] ([email or web address])  CLM #[id number];  Lydia Machuca [phone]         Assessment:    Scot Bishop was not able to see me on a video camera today due to technical difficulties.  His father and care team staff report that he has made a hospital visit and medication changes were made for him.  He has been less irritable with less disruptive outbursts.  Sleep is going better..  Taking lorazepam regularly has helped him to stay regulated.  Depakote is ordered 500 mg twice daily for mood regulation.  Paroxetine will continue at 30 mg daily for depression and anxiety symptoms.    Medication side effects and alternatives were reviewed. Health promotion activities recommended and reviewed today. All questions addressed. Education and counseling completed regarding risks and benefits of medications and psychotherapy options.    Treatment Plan:        Lorazepam 0.5 mg twice daily    Hydroxyzine 10 mg twice daily as needed for appointments    Depakote 500 mg twice daily    Paroxetine 30 mg daily        Continue all other medications as reviewed per electronic medical record today.     Safety plan reviewed. To the Emergency Department as  needed or call after hours crisis line at [phone] or [phone]. [place] Crisis Text [place]. Text [place] to 151696 or Suicide LifeLine Chat: [email or web address] [email or web address]/chat/    To schedule individual or family therapy, call [facility] at [phone].    Schedule an appointment with me in 6 weeks or sooner as needed. Call [facility] at [phone] to schedule.    Follow up with primary care provider as planned or for acute medical concerns.    Call the psychiatric nurse line with medication questions or concerns at [phone].    [employer] may be used to communicate with your provider, but this is not intended to be used for emergencies.    Crisis Resources:    National Suicide Prevention Lifeline: 937.506.5546 (TTY: 986.565.3236). Call anytime for help.  (www.suicidepreventionlifeline.org)  National Leonard on Mental Illness (www.melvin.org): 111.237.3362 or 897-658-4544.   Mental Health Association (www.mentalhealth.org): 495.558.3651 or 232-339-3778.  [place] Crisis Text [place]: Text [place] to 356551  Suicide LifeLine Chat: [email or web address] [email or web address]/chat    Administrative Billing:   Time spent with patient was 30 minutes and greater than 50% of time or 20 minutes was spent in counseling and coordination of care regarding above diagnoses and treatment plan.    Patient Status:  Patient will continue to be seen for ongoing consultation and stabilization.    Signed:   LASHAWN Jeong-BC   Psychiatry

## 2022-02-17 ASSESSMENT — ANXIETY QUESTIONNAIRES: GAD7 TOTAL SCORE: 12

## 2022-02-18 ENCOUNTER — VIRTUAL VISIT (OUTPATIENT)
Dept: FAMILY MEDICINE | Facility: CLINIC | Age: 42
End: 2022-02-18
Payer: COMMERCIAL

## 2022-02-18 DIAGNOSIS — L02.419 CELLULITIS AND ABSCESS OF LEG: Primary | ICD-10-CM

## 2022-02-18 DIAGNOSIS — L03.119 CELLULITIS AND ABSCESS OF LEG: Primary | ICD-10-CM

## 2022-02-18 DIAGNOSIS — D69.6 THROMBOCYTOPENIA, UNSPECIFIED (H): ICD-10-CM

## 2022-02-18 PROCEDURE — 99213 OFFICE O/P EST LOW 20 MIN: CPT | Mod: 95 | Performed by: PHYSICIAN ASSISTANT

## 2022-02-18 RX ORDER — AMANTADINE HYDROCHLORIDE 50 MG/5ML
SOLUTION ORAL
COMMUNITY
Start: 2022-02-07 | End: 2022-03-31

## 2022-02-18 RX ORDER — BENZTROPINE MESYLATE 0.5 MG/1
0.5 TABLET ORAL
COMMUNITY
Start: 2021-09-16 | End: 2022-03-31

## 2022-02-18 RX ORDER — CLINDAMYCIN HCL 300 MG
300 CAPSULE ORAL 3 TIMES DAILY
Qty: 30 CAPSULE | Refills: 0 | Status: SHIPPED | OUTPATIENT
Start: 2022-02-18 | End: 2022-09-15

## 2022-02-18 RX ORDER — OLANZAPINE 2.5 MG/1
2.5 TABLET, FILM COATED ORAL
COMMUNITY
Start: 2021-08-12 | End: 2022-04-22

## 2022-02-18 RX ORDER — METOPROLOL TARTRATE 25 MG/1
TABLET, FILM COATED ORAL
COMMUNITY
Start: 2021-09-15 | End: 2022-03-29

## 2022-02-18 RX ORDER — BACITRACIN ZINC 500 [USP'U]/G
OINTMENT TOPICAL
COMMUNITY
End: 2023-05-09

## 2022-02-18 NOTE — PROGRESS NOTES
Scot is a 41 year old who is being evaluated via a billable video visit.      How would you like to obtain your AVS? [id number]  If the video visit is dropped, the invitation should be resent by: Text to cell phone: [phone]  Will anyone else be joining your video visit? No    Video Start Time: 11:04 AM    Assessment & Plan     Cellulitis and abscess of leg  Advised to take clindamycin TID.  Requested staff draw a line around edge of erythema.  If redness expands outside of this or gets worse he well need to be seen in clinic.  Over the weekend to ED.  Requested a follow-up visit on Monday   - clindamycin (CLEOCIN) 300 MG capsule; Take 1 capsule (300 mg) by mouth 3 times daily          No follow-ups on file.    Ramona Ann Aaseby-Aguilera, PA-C  [facility]   Scot is a 41 year old who presents for the following health issues  accompanied by his Care taker.      Patinet non-verbal and lives in group home   Has a pustule that got significantly worse in 1 day.  Has a history of msra and was treated with bactrim in December.        History of Present Illness     Reason for visit:  [place]  Symptom onset:  1-3 days ago  Symptom intensity:  Mild  Symptom progression:  Staying the same  Had these symptoms before:  Yes  Has tried/received treatment for these symptoms:  Yes  Previous treatment was successful:  Yes  Prior treatment description:  Antibiotics    He eats 2-3 servings of fruits and vegetables daily.He consumes 8 sweetened beverage(s) daily.He exercises with enough effort to increase his heart rate 9 or less minutes per day.  He exercises with enough effort to increase his heart rate 3 or less days per week.   He is taking medications regularly.       Possible infection: On the back of right leg. Patient has a history of MRSA infections. Patient suffered a TBI about 18 months ago and is non-verbal and bed bound. There is a photo on swiftQueuet. Patient is being taken care of by a  care manager named José Miguel            Review of Systems   Constitutional, HEENT, cardiovascular, pulmonary, gi and gu systems are negative, except as otherwise noted.      Objective           Vitals:  No vitals were obtained today due to virtual visit.    Physical Exam   GENERAL: Healthy, alert and no distress  EYES: Eyes grossly normal to inspection.  No discharge or erythema, or obvious scleral/conjunctival abnormalities.  RESP: No audible wheeze, cough, or visible cyanosis.  No visible retractions or increased work of breathing.    SKIN: 4 cm erythema with 1 cm pustule behind and lateral below knee   NEURO: Cranial nerves grossly intact.  Mentation and speech appropriate for age.  PSYCH: Mentation appears normal, affect normal/bright, judgement and insight intact, normal speech and appearance well-groomed.        Video-Visit Details    Type of service:  Video Visit    Video End Time:11:20 AM    Originating Location (pt. Location): Home    Distant Location (provider location):  [facility]     Platform used for Video Visit: [employer]

## 2022-02-21 ENCOUNTER — VIRTUAL VISIT (OUTPATIENT)
Dept: FAMILY MEDICINE | Facility: CLINIC | Age: 42
End: 2022-02-21
Payer: COMMERCIAL

## 2022-02-21 DIAGNOSIS — L02.419 CELLULITIS AND ABSCESS OF LEG: Primary | ICD-10-CM

## 2022-02-21 DIAGNOSIS — L03.119 CELLULITIS AND ABSCESS OF LEG: Primary | ICD-10-CM

## 2022-02-21 PROCEDURE — 99213 OFFICE O/P EST LOW 20 MIN: CPT | Mod: 95 | Performed by: FAMILY MEDICINE

## 2022-02-21 NOTE — PROGRESS NOTES
Scot is a 41 year old who is being evaluated via a billable video visit.      How would you like to obtain your AVS? MyChart  If the video visit is dropped, the invitation should be resent by: Text to cell phone: [phone]  Will anyone else be joining your video visit? No     Video Start Time: 11:32 AM    Assessment & Plan     Cellulitis and abscess of leg - due to improving appearance, will continue on clindamycin for the full 10 day course. Cautioned to monitor for worsening redness or drainage. Advised a wound this size may take several months to heal. Advised F2F visit ideally once he finishes his course of abx to better evaluate. Advised to monitor for change in stools due to c diff risk with clindamycin.       Return in about 1 week (around 2/28/2022) for as needed.    Hedy Jack MD  [facility]      Subjective   Scot is a 41 year old who presents for the following health issues     Wound Check         Seen 3 days ago for abscess and cellulitis of the posterior left calf. Started on clindamycin TID due to history of MRSA infection.     Caretaker reports the redness and size are much improved compared to 3 days ago.     No change in stools - no apparent abdominal pain although he is not able to communicate this.       Review of Systems   Constitutional, HEENT, cardiovascular, pulmonary, gi and gu systems are negative, except as otherwise noted.      Objective       Vitals:  No vitals were obtained today due to virtual visit.    Physical Exam   SKIN: abscess on the postero lateral surface of the left proximal calf, surrounding erythema, no apparent drainage. PHOTO SENT        Video-Visit Details    Type of service:  Video Visit    Video End Time:11:44 AM    Originating Location (pt. Location): Home    Distant Location (provider location):  [facility]     Platform used for Video Visit: [employer]

## 2022-02-22 NOTE — PROGRESS NOTES
[facility]      NAME:  Scot Bishop             :  1980    MRN: [id number]    CODE STATUS: DNR    FACILITY: [facility] [[id number]]       CHIEF COMPLAIN/REASON FOR VISIT:  Chief Complaint   Patient presents with     Review Of Multiple Medical Conditions     brain injury       HISTORY OF PRESENT ILLNESS: Scot Bishop is a 40 y.o. male being seen per today per nursing request due to low bp and high pulse rate. WNL mentation this am, brain injury chronic non responsive with some eye tracking and unpurposeful movment.  He is back from the hospital after an extensive 3-week stay, he had aggressive behaviors here on the TCU and was sent out and was followed by psychiatric care at [facility]. We have been monitoring his LOC and has been sleepy, has TBI due to hypoxia.   He is a TBI and seen in his room this a.m. nonverbal he has a history of aphasia very calm poor eye contact.  [place] [facility] in May 2020. At that juncture he was in cardiac cath lab and suffered a hypoxic brain injury, high temp and thrombotic event. He is seen in his room today. Non verbal and did not track with his eyes. He will make eye contact. H. He will need ongoing rehab services with PT/OT/ST. Has Gtube due to his dysphagia,  Unfortunealy due to Scot Hypoxic brain injury he will not be able to understand hygiene education and nursing will need to meet these needs for pt. Therapy continues to work with him, seen ambualting with SBA two.Staff to anticipate needs and provide good elizabeth care two times a day and prn.   continues to work with family towards DC to a group home after TCU stay as patient will be unable to care for self, remains dependant on staff for all ADL and mobility as well as GTube for feedings, he also has an order for puréed diet level 4 with thick nectar liquids.  However at this juncture we will look at the tube feeding this is  more pleasure feedings and if he gets aggressive he could aspirate. Scot is very awake today, has had decrease in his Depakote recently reduced and he is much more awake  Today with good eye contact. I have reviewed Scot case with other rounding physician as we considered decrease in psycotropics but at this juncture will not make changes.      . No Known Allergies:     Current Outpatient Medications   Medication Sig     acetaminophen (TYLENOL) 650 mg/20.3 mL Soln 650 mg every 6 (six) hours as needed. Via PEG-tube      amantadine HCL (SYMMETREL) 50 mg/5 mL solution 100 mg every 12 (twelve) hours. Via PEG-tube     aspirin 81 MG EC tablet 81 mg daily. Via PEG-tube     atorvastatin (LIPITOR) 40 MG tablet 40 mg at bedtime. Via PEG-tube     haloperidoL (HALDOL) 5 MG tablet 5 mg by G-tube route 3 (three) times a day.     lisinopriL (PRINIVIL,ZESTRIL) 2.5 MG tablet 1.25 mg daily. Via PEG-tube, hold if SBP <90     LORazepam (ATIVAN) 0.5 MG tablet 1 tablet (0.5 mg total) by G-tube route 4 (four) times a day for 3 days.     metoprolol tartrate (LOPRESSOR) 25 MG tablet 6.25 mg 2 (two) times a day. Via PEG-tube, hold if SBP <90, HR <60     neomycin-bacitracin-polymyxin B (NEOSPORIN) 3.5-400-5,000 mg-unit-unit OiPk ointment Apply 1 application topically 2 (two) times a day.     omeprazole (PRILOSEC) 2 mg/mL SusR suspension 40 mg daily before breakfast. Via PEG-tube     PARoxetine (PAXIL) 20 MG tablet 20 mg by G-tube route every morning.     QUEtiapine (SEROQUEL) 25 MG tablet 50 mg 3 (three) times a day. Via PEG-tube give 25 mg am/pm with 12.5 mg mid day due to increased agitation     ticagrelor (BRILINTA) 90 mg Tab 90 mg 2 (two) times a day. Via PEG-tube     traZODone (DESYREL) 50 MG tablet 50 mg at bedtime. Via PEG-tube     white petrolatum (AQUAPHOR ORIGINAL) 41 % Oint Apply 1 application topically 2 (two) times a day. Apply to feet/legs         REVIEW OF SYSTEMS:  Unable to verbalize due to aphasia    PHYSICAL  EXAMINATION:  Vitals:    12/23/20 0612   BP: 129/66   Pulse: 66   Temp: 99.1  F (37.3  C)   Weight: 157 lb 3.2 oz (71.3 kg)         GENERAL: Does not follow simple commands, makes eye contact, non verbal.  HEENT: Head is normocephalic with normal hair distribution. No evidence of trauma.Oral: reddened wth white coating on tongue left inner cheek Ears: No acute purulent discharge. Eyes: Conjunctivae pink with no scleral jaundice. Nose: Normal mucosa and septum. NECK: Supple with no cervical or supraclavicular lymphadenopathy. Trachea is midline, healing trach stomaLungs : He is CTA  EXTREMITIES: Atraumatic. Full range of motion on both upper and lower extremities, there is no tenderness to palpation, no pedal edema, no cyanosis or clubbing, no calf tenderness, normal cap refill, no joint swelling.  SKIN: Warm and dry, no erythema noted, abdomen with g tube  NEUROLOGICAL: The patient is oriented to person, TBI    Social Distancing with PPE practiced due to Covid 19    LABS:    Lab Results   Component Value Date    WBC 10.6 12/08/2020    HGB 13.7 (L) 12/08/2020    HCT 43.8 12/08/2020    MCV 94 12/08/2020     12/08/2020       Results for orders placed or performed in visit on 12/08/20   Basic Metabolic Panel   Result Value Ref Range    Sodium 149 (H) 136 - 145 mmol/L    Potassium 3.8 3.5 - 5.0 mmol/L    Chloride 112 (H) 98 - 107 mmol/L    CO2 25 22 - 31 mmol/L    Anion Gap, Calculation 12 5 - 18 mmol/L    Glucose 137 (H) 70 - 125 mg/dL    Calcium 8.8 8.5 - 10.5 mg/dL    BUN 23 (H) 8 - 22 mg/dL    Creatinine 0.74 0.70 - 1.30 mg/dL    GFR MDRD Af Amer >60 >60 mL/min/1.73m2    GFR MDRD Non Af Amer >60 >60 mL/min/1.73m2           No results found for: HGBA1C  No results found for: [id number]  No results found for: [id number]    ASSESSMENT/PLAN:  1. Hypotension due to drugs    2. Hypoxic brain damage (H)      1. Hypotension: On Metaprel 6.25, we are using this for pulse regulation as well, BP is a bit lower its  systolically 96 but ok to give as pulse at 114. He will be monitored by sn . Non ambulatory, in a wc.    2. Hypoxic Brain injury: Scot is in a semi vegative state, med changes made in acute care and  is less aggressive now. Total dependance on staff for all ADL and med/tube management.  Pallative consult with [facility] in place. Rounding MD did decrease his Depakote but remains calm and minimally responsive.       Electronically signed by:  Fouzia High CNP  This progress note was completed using Dragon software and there may be grammatical errors.           Detail Level: Simple Sunscreen Recommendations: Continual photo protection with zinc based sunscreen and photo protective clothing. Detail Level: Generalized Sunscreen Recommendations: Mineral based sunscreens containing zinc and titanium to face, neck, and chest Laser Recommendations: Discussing with  pricing for laser treatment Topical Retinoids Recommendations: Retinoids are recommended to help with cell turnover and helps stimulate collagen Ipl Recommendations: Discussing with  pricing for IPL treatment Sunscreen Recommendations: Regular use of mineral based sunscreen and photo protective clothing

## 2022-02-23 ENCOUNTER — HOSPITAL ENCOUNTER (OUTPATIENT)
Dept: NUTRITION | Facility: CLINIC | Age: 42
Discharge: HOME | End: 2022-02-23
Attending: DIETITIAN, REGISTERED | Admitting: DIETITIAN, REGISTERED
Payer: COMMERCIAL

## 2022-02-23 VITALS — WEIGHT: 168.8 LBS | BODY MASS INDEX: 25.67 KG/M2

## 2022-02-23 PROCEDURE — 97803 MED NUTRITION INDIV SUBSEQ: CPT | Mod: GT,95 | Performed by: DIETITIAN, REGISTERED

## 2022-02-23 NOTE — PROGRESS NOTES
Video-Visit Details    Type of service:  Video Visit    Video Start Time (time video started): 2:59    Video End Time (time video stopped): 3:14    Originating Location (pt. Location): Other retirement     Distant Location (provider location):  [facility] NUTRITION SERVICES     Mode of Communication:  Video Conference via [facility]    OUTPATIENT NUTRITION ASSESSMENT (VIDEO VISIT DUE TO COVID-19)   REASON FOR ASSESSMENT  Scot Bishop referred by Dr. Morales for MNT related to   Traumatic brain injury with loss of consciousness, sequela (H) [S06.9X9S]  - Primary       G tube feedings (H) [Z93.1]          Patient accompanied by Gerald, father; step mother; staff member, Priyanka                 ASSESSMENT   Nutrition History:  - Information obtained from father, step mom and staff worker.  Patient's step mom describes patient's eating habits as limited food choices with hamburgers and pizza but now eats all foods provided to him. Patient's father states that when they feed him, patient will eat most of his meal.  Priyanka, staff worker, states patient eats 45-50% of meals.  Patient currently living at [facility] in [place].  Patient has 7 year old son.      Patient's father states patient's  lbs. Patient is on a regular diet with thin liquids.  Patient is a total feed.     Patient has regular formed bowel movements either daily or every other day.       7/5/2021 Received calorie count from group [place].  Patient eating 100% of meals.  Patient receiving 2 meals per day as  states patient will vomit if given breakfast after TF administration.  Patient having difficulty drinking liquids as will chew on cups or straws.  Patient's father bought sippy cup for patient to try and seems to do well with it. Patient will drink 16 oz fluid (orally) water and milk.  Patient receiving 3 (60 mL) water flushes 8 times per day.   asking if evening flushes can be changed  due to prevent waking patient 3 times per night.        7/19 No calorie count provided by [facility].  Diet recall past 24 hours: scrambled egg and yogurt; tuna sandwich, banana and water; spaghetti with shrimp, orange and milk-drank 25%. Patient continues to drink with sippy cup.  Patient's father states can drink entire sippy cup (8-10 oz in 1 hour). Staff members don't feel patient will drink more than 8 oz per day from sippy cup. Patient does not eat full breakfast due to previous vomiting after the TF.  Per father, patient working with speech therapist, PT and OT weekly.      8/2/2021 Patient's step mother states that patient will drink entire sippy cup of 8 oz if someone helps him.  José Miguel [facility] director, states did not receive order to reduce TF so has continued to run at 70 mL/hr x 11 hours. Patient having regular daily stools.       8/18/2021 Received food log from [facility].  Patient eating 2-3 meals + 1-2 snacks (yogurt or applesauce).  Pancakes for breakfast.  Lunch -turkey sandwich or spaghetti with meat sauce Dinner: chicken vale.  Parents state patient can drink liquids when continually offered.  Father asking if patient can use different cup to increase fluid intake.       9/13/2021 Patient continues on current TF regimen of Isosource 1.5 @ 70 mL/hr x 11 hours.  Staff have been increasing protein intake in diet to help improve protein intake.  Note patient to participate in [employer] for OT/PT/ST.  Staff using syringe to provide fluid to remove food from teeth and oral cares.  Patient given water through syringe for fluids also.        10/18/2021 Patient's TF was reduced to 35 mL/hr x 11 hours.  Patient having daily-every other day bowel movement.  Patient will occasionally have constipation and then will be given protocol for constipation.  Patient will eat all foods given to him.  Patient having sippy cup for fluids.  José Miguel [facility] staff director states receiving 6-8 oz fluid at  "meals.  Patient will be given water flush through syring into mouth 30 mL per time. Continues to receive 180 mL every 2 hours during the day (7 AM-8 PM).      11/22/2021 Patient will have swallow evaluation in December.  Patient continues with same TF regimen.  [facility] home director states water flush used to swish mouth not as way to provide fluid needs.      1/26/2022 Patient has been able to drink fluid out of water bottle. May drink 16 oz fluids with meals.  Patient has speech evaluation and able to switch cup on how to drink fluids.  Per staff, patient will eat all foods provided to him.  Patient eating 3 meals + 2 snacks.  Received food log from [facility].  RD received food logs without recorded portion sizes.      2/23/2022 Received food logs from group home.  Tracked oral intake of minimum 48 oz fluid per day.  Per records, fluid output in am 1400 mL and 1600 mL in PM.  Patient has not been receiving tube feedings for the past 3-4 weeks.  Patient's weight remains stable at 168 lbs.  Food records do not include portion sizes so unable to accurately assess total calorie consumption.  Patient eats 100% of meals and snacks provided to him.      Typical intake 2/15-2/22/2022  Breakfast: waffles and eggs  Snack: crackers or yogurt or pudding cup and orange  Lunch: meat sandwich  Snack: yogurt or crackers or pudding  Dinner: tilapia, rice and broccoli or chicken sandwich + yogurt and orange juice        Exercise: Patient working with PT.      NUTRITION FOCUSED PHYSICAL ASSESSMENT (NFPA) FOR DIAGNOSING MALNUTRITION  Yes         Observed:   No nutrition-related physical findings observed     Obtained from Chart/Interdisciplinary Team:  None noted      LABS  Labs reviewed     MEDICATIONS/SUPPLEMENTS  Medications/supplements reviewed-no MVI      ANTHROPOMETRICS   Height: 5'8\"  Weight: 168.8 lbs (1/23/2022)  BMI (kg/m2): 26.1 kg/m2  Weight Status:  Overweight   %IBW: 116%  Weight History: per group home weights:  168.2 " (1/26/2022)  170.8 lbs (1/4/2022)  168.4 lbs (12/28/2021)  165.8 lbs (12/21/2021)  166.8 lbs (12/14/2021)   172 lbs (11/22/2021)  179.6 lbs (10/18/2021)  Wt Readings from Last 10 Encounters:   02/23/22 76.6 kg (168 lb 12.8 oz)   02/08/22 81.6 kg (180 lb)   01/17/22 81.6 kg (180 lb)   11/22/21 78 kg (172 lb)   10/18/21 81.5 kg (179 lb 9.6 oz)   09/13/21 83.1 kg (183 lb 3.2 oz)   08/18/21 80.7 kg (178 lb)   08/02/21 80.4 kg (177 lb 3.2 oz)   07/19/21 83.7 kg (184 lb 9.6 oz)   07/05/21 81.8 kg (180 lb 6.4 oz)      ASSESSED NUTRITION NEEDS  Estimated Energy Needs: 0079-2270 kcals/day (20-25 Kcal/Kg)  Justification:  (maintenance)  Estimated Protein Needs: 70-84 grams protein/day (1.0-1.2 g pro/Kg)  Justification:  (preservation of lean body mass)  Estimated Fluid Needs: 9902-8325 mL/day (30-35 mL/kg)      NUTRITION SUPPORT ENTERAL:  Type of Feeding Tube: G tube   Enteral Frequency:  Cyclic  Enteral Regimen: Isosource 1.5   Total Enteral Provisions:Isosource 1.5 at 35 mL/hr x 11 hours -7 PM - 8 AM) to provide 575 calories, 26 grams protein, 292 mL free fluid and 6 grams fiber  1440 water flush + 292 free water from TF + 540 mL oral fluids = 2272 mL (28 mL/kg ABW)  Adjust fluid flushes 180 mL water flush 8 times per day   180 mL water flush before and after tube feeding (8 PM and 7 AM)  180 mL water flush 9 AM, 11 AM, 1 PM, 3 PM, 5 PM, 7 PM       ASSESSED MALNUTRITION STATUS  % Weight Loss:  None noted  % Intake:  No decreased intake noted  Subcutaneous Fat Loss:  None observed  Loss of Muscle Mass:  None observed  Fluid Retention:  None noted     Malnutrition Diagnosis:  Patient does not meet two of the above criteria necessary for diagnosing malnutrition     EVALUATION/PROGRESS TOWARDS GOALS  Previous nutrition goals:  Weekly weight-improving   Calorie count for 7 days (portion provided and amount eaten)-improving   Isosource 1.5 at 35 mL/hr x 11 hours (8 PM-7AM)-met   180 mL water flush at 8 PM and 7 AM (before and  after TF administration)-met   180 mL water flush 9 AM, 11 PM, 1 PM, 3 PM, 5 PM, 7 PM-met   Add protein source at breakfast-improving   Measure protein source at lunch and dinner-not met       Previous nutrition diagnosis: Excessive nutrient intake related to G-tube feeding and oral intake as evidenced by previous 15% weight gain in 8 months and recent 6% weight loss with reduction on TF regimen -resolved      Current nutrition diagnosis: Excessive nutrient intake related to G-tube feeding and oral intake as evidenced by previous 15% weight gain in 8 months and recent 6% weight loss with reduction on TF regimen      INTERVENTIONS   Nutrition Prescription   Recommend 7 day calorie count to assess oral intake to wean TF; add protein source at breakfast; protein bar as snack      IMPLEMENTATION   Assessed learning needs and learning preference  Teaching Method(s) used: Explanation  Vitamin and Mineral Supplements: suggest complete multivitamin and mineral once weaned off TF  Diet Education:  Provided education on calorie intake   Nutrition Education (Content):              a)  Discussed progress towards goals.  Reviewed intake.  Patient has been off TF for 3 weeks.  Patient's weight has been stable and within normal limits.  Due to patient able to drink from water bottle, recommend patient consume 80 oz fluid per day.  Suggest 3-8 oz milk per day at meals with 8 oz water + additional 48 oz orally (16 oz between breakfast and lunch and 16 oz between lunch and dinner and 16 oz between dinner and bedtime).  Patient requires 70-80 fluids per day and patient currently consuming 48 oz+ per day.  Patient's family and staff member verbalizes understanding of next steps.     GOALS  Recommend discontinue tube feeding at night  Recommend discontinue water flushes  8 oz milk 3 times per day at meals  16 oz water from breakfast to lunch  16 oz water from lunch to dinner   16 oz water from dinner to bed   Weekly weights  Start  complete multivitamin and mineral      FOLLOW UP/MONITORING   Progress towards goals will be monitored and evaluated per protocol and Practice Guidelines  Guardian or group home staff to follow up if patient has 5 lb weight loss in 6 months.  RD name and number provided      Time Spent with Patient  15 minutes     Lauryn Licona, RD, LD  [facility] Outpatient Dietitian  [phone] (office phone)

## 2022-02-24 NOTE — TELEPHONE ENCOUNTER
RN called patient's father and reviewed with him Dr. Marcus's recommendations below. Patient's father verbalized understanding and is in agreement with plan and will discuss with PT to make sure they are administering midodrine/salt tabs when needed for patient's BP.         Blood pressure below SBP 80 is concerning, but at 80 or higher is fine. Again, they have midodrine and salt tabs available during PT sessions if needed. He has chronically low blood pressures due to heart failure but is well compensated. PT can call me directly perhaps to discuss.   CF

## 2022-02-25 NOTE — DISCHARGE INSTRUCTIONS
GOALS  Recommend discontinue tube feeding at night  Recommend discontinue water flushes  8 oz milk 3 times per day at meals  16 oz water from breakfast to lunch  16 oz water from lunch to dinner   Weekly weight  Start complete multivitamin and mineral

## 2022-02-28 ENCOUNTER — TELEPHONE (OUTPATIENT)
Dept: NUTRITION | Facility: CLINIC | Age: 42
End: 2022-02-28
Payer: COMMERCIAL

## 2022-02-28 NOTE — PROGRESS NOTES
Called and spoke with patient's father, Gerald and group home director, José Miguel.  Informed them that we need more data on fluids consumed and input and output due not compute.  Group home to track all fluids consumed for next 4 weeks and weekly weight.  Patient to have follow up in early April to reassess fluid intake and weight.  Patient's father and José Miguel verbalized understanding of plan.    Lauryn Licona, RD, LD  [facility] Outpatient Dietitian  [phone] (office phone)

## 2022-03-01 DIAGNOSIS — I46.9 CARDIAC ARREST (H): ICD-10-CM

## 2022-03-02 DIAGNOSIS — I46.9 CARDIAC ARREST (H): ICD-10-CM

## 2022-03-02 RX ORDER — ASPIRIN 81 MG/1
TABLET, CHEWABLE ORAL
Qty: 90 TABLET | Refills: 3 | Status: SHIPPED | OUTPATIENT
Start: 2022-03-02 | End: 2023-01-04

## 2022-03-02 RX ORDER — ASPIRIN 81 MG
TABLET,CHEWABLE ORAL
Qty: 28 TABLET | Refills: 2 | OUTPATIENT
Start: 2022-03-02

## 2022-03-02 NOTE — TELEPHONE ENCOUNTER
[facility] it [facility] to [facility] and they usually fill them a week or two early    [phone] Josselin Pharmacy Tech and paolo is the pharmacist

## 2022-03-18 ENCOUNTER — VIRTUAL VISIT (OUTPATIENT)
Dept: BEHAVIORAL HEALTH | Facility: CLINIC | Age: 42
End: 2022-03-18
Payer: COMMERCIAL

## 2022-03-18 DIAGNOSIS — F33.0 DEPRESSION, MAJOR, RECURRENT, MILD (H): Primary | ICD-10-CM

## 2022-03-18 PROCEDURE — 90846 FAMILY PSYTX W/O PT 50 MIN: CPT | Mod: 95 | Performed by: COUNSELOR

## 2022-03-18 NOTE — PROGRESS NOTES
[facility] Collaborative Care Psychiatry Service   2022      Behavioral Health Clinician Progress Note    Patient Name: Scot Bishop           Service Type:  Individual      Service Location:   Face to Face in Home / Community     Session Start Time: 111pm  Session End Time: 137pm      Session Length: 16 - 37      Attendees: Father, Mother and care staff worker     Service Modality:  Video Visit:      Provider verified identity through the following two step process.  Patient provided:  Patient  and Patient address    Telemedicine Visit: The patient's condition can be safely assessed and treated via synchronous audio and visual telemedicine encounter.      Reason for Telemedicine Visit: Services only offered telehealth    Originating Site (Patient Location): Patient's home    Distant Site (Provider Location): Provider Remote Setting- Home Office    Consent:  The patient/guardian has verbally consented to: the potential risks and benefits of telemedicine (video visit) versus in person care; bill my insurance or make self-payment for services provided; and responsibility for payment of non-covered services.     Patient would like the video invitation sent by:  My Chart    Mode of Communication:  Video Conference via [facility]    As the provider I attest to compliance with applicable laws and regulations related to telemedicine.    Visit Activities (Refresh list every visit): [facility] Only    Diagnostic Assessment Date: 2022  Treatment Plan Review Date: 2022  See Flowsheets for today's PHQ-9 and CLEMENT-7 results  Previous PHQ-9:   PHQ-9 SCORE 11/15/2021   PHQ-9 Total Score MyChart 4 (Minimal depression)   PHQ-9 Total Score 4     Previous CLEMENT-7:   CLEMENT-7 SCORE 2022   Total Score 12 (moderate anxiety)   Total Score 12       HEYDI LEVEL:  No flowsheet data found.    DATA  Extended Session (60+ minutes): No  Interactive Complexity: No  Crisis: No  [facility] Patient: No    Treatment Objective(s)  Addressed in This Session:  Target Behavior(s): reduce physical combative behaviors, food intake, attending appointments    Depressed Mood: Increase interest, engagement, and pleasure in doing things  Decrease frequency and intensity of feeling down, depressed, hopeless  Feel less tired and more energy during the day   reduce combative behaviors during interactions with staff    Current Stressors / Issues:  Worse/Better/Same: Care staff say medications were decreased last visit and pt is more combative and talking more. Care staff say that xander is back to physical activity and kicking. At time is almost falling out of the wheel chair and has been sliding out of the bed. Pt's parents were gone 3 weeks and visited him Monday and Tuesday of this week. They say that he laughs and communicates with them more than he use to. Care staff says that he is taking everything food related by mouth and they are monitoring fluid intake. Dad says that staff were suppose to take him to physical therapy on Tuesday and Xander was swinging his arms and staff didn't want to take him. Dad says he went to physical therapy last week with his sister and he stood for a bit and then his bp went low. Care staff say he stated physical therapy again and his bp is really low. Day of pt he is supposed to take a salt pill and another medication for the car ride. Dr. Velaqzuez recommended this and told it to the physical therapist. Staff say he is not sleeping as much during the day. During the times when they have contact with xander he is physical with them, with the exception of feeding times. While watching TV or in other areas he will start talking to himself and moving his arms and legs. Pt was responsive on Sunday for his parents and answered a questioned that was simple. He still is not doing choices.   Staff say he gets along pretty well with residents except for one.   Side Effects: none  Mood: pretty upset and down, was happy when parents  came to visit and he didn't want to get his jacket on   Appetite: unremarkable  Sleep: unremarkable    Caffeine: Pt denies     Interventions:   Medication Questions/Requests: refill lorazepam- will be out soon  Are wondering if going back to 1mg a day and see if this makes a difference and to see if they can get 2 months of authorizations for the medication since Elsa will be out of office next month for a week         Progress on Treatment Objective(s) / Homework:  Satisfactory progress - ACTION (Actively working towards change); Intervened by reinforcing change plan / affirming steps taken    Motivational Interviewing    MI Intervention: Co-Developed Goal: reduce combative physical behavior, Expressed Empathy/Understanding, Supported Autonomy, Collaboration, Evocation, Permission to raise concern or advise, Open-ended questions, Reflections: simple and complex, Change talk (evoked) and Reframe     Change Talk Expressed by the Patient: Need to change Committment to change Activation Taking steps    Provider Response to Change Talk: E - Evoked more info from patient about behavior change, A - Affirmed patient's thoughts, decisions, or attempts at behavior change, R - Reflected patient's change talk and S - Summarized patient's change talk statements    Also provided psychoeducation about behavioral health condition, symptoms, and treatment options    Care Plan review completed: Yes    Medication Review:  No changes to current psychiatric medication(s)    Medication Compliance:  Yes    Changes in Health Issues:   Yes:   low bp and then sometimes it goes really high     Chemical Use Review:   Substance Use: Chemical use reviewed, no active concerns identified      Tobacco Use: No current tobacco use.      Assessment: Current Emotional / Mental Status (status of significant symptoms):  Risk status (Self / Other harm or suicidal ideation)  Patient denies a history of suicidal ideation, suicide attempts, self-injurious  behavior, homicidal ideation, homicidal behavior and and other safety concerns  Patient denies current fears or concerns for personal safety.  Patient denies current or recent suicidal ideation or behaviors.  Patient denies current or recent homicidal ideation or behaviors.  Patient denies current or recent self injurious behavior or ideation.  Patient denies other safety concerns.  A safety and risk management plan has not been developed at this time, however patient was encouraged to call [place] [phone] should there be a change in any of these risk factors.    Appearance:   Did not see the pt  Eye Contact:   Did not see the pt  Psychomotor Behavior: Normal   Attitude:   Cooperative   Orientation:   All  Speech   Rate / Production: Pt is non-verbal   Volume:  Pt is non-verbal  Mood:    Depressed  Irritable   Affect:    Appropriate   Thought Content:  Clear   Thought Form:  Coherent  Logical   Insight:    Good     Diagnoses:  1. Depression, major, recurrent, mild (H)        Collateral Reports Completed:  Communicated with:   Communicated with MISA Jeong [facility]    Plan: (Homework, other):  Patient was given information about behavioral services and encouraged to schedule a follow up appointment with the clinic [facility] as needed.  He was also given information about mental health symptoms and treatment options .  CD Recommendations: No indications of CD issues. Alena Abbott, ERMELINDA, [facility]        ______________________________________________________________________    Integrated Primary Care Behavioral Health Treatment Plan    Patient's Name: Scot Bishop  YOB: 1980    Date of Creation: 03/18/2022  Date Treatment Plan Last Reviewed/Revised: 03/18/2022    DSM5 Diagnoses: 296.31 (F33.0) Major Depressive Disorder, Recurrent Episode, Mild _  Psychosocial / Contextual Factors: non-verbal, living in care center, doing physical therapy, low blood pressure  PROMIS (reviewed  every 90 days):   PROMIS 10 Global Mental Health Score Global Physical Health Score   2/16/2022 5 10     PROMIS 10 PROMIS TOTAL - SUBSCORES   2/16/2022 15         Referral / Collaboration:  Referral to another professional/service is not indicated at this time.    Anticipated number of session for this episode of care: 6-10  Anticipation frequency of session: As determined by Elsa Rahman  Anticipated Duration of each session: 16-37 minutes  Treatment plan will be reviewed in 90 days or when goals have been changed.       MeasurableTreatment Goal(s) related to diagnosis / functional impairment(s)  Goal 1: Patient will reduce combative physical behaviors.    I will know I've met my goal when I am not longer kicking and fighting during interactions.      Objective #A (Patient Action)    Patient will not act in physical aggression towards care staff during inteactions of dressing, transporting and other daily activities.  Status: New - Date: 03/18/2022     Intervention(s)  Therapist will provide support to care staff and Scot's parents each meeting. Will assist them to identify behavioral patterns and ways to reduce unwanted behavior through CBT and medication changes provided by psychiatrist       Parent / Guardian has reviewed and agreed to the above plan.      Alena Abbott, [facility]  March 18, 2022

## 2022-03-25 ENCOUNTER — TELEPHONE (OUTPATIENT)
Dept: INTERNAL MEDICINE | Facility: CLINIC | Age: 42
End: 2022-03-25
Payer: COMMERCIAL

## 2022-03-25 NOTE — TELEPHONE ENCOUNTER
See recommendations as orders:    Patient's fluid flush were 180 mL 8 times per day   9 AM, 11 AM, 1 PM 3 PM 5 PM 7 PM     If not taking any fluids would recommend additional water flush (180 mL) at 7 AM and 9 PM     Total fluids 1800 mL (24 mL/kg ABW).

## 2022-03-25 NOTE — TELEPHONE ENCOUNTER
Group home is asking for G tube  Flushing for  water intake.     Patient refuses to take any water.      Orders to flush g-tube.     Please please fax order to : [phone]      Please have provider   Maryann Mark RN  -[facility]

## 2022-03-25 NOTE — TELEPHONE ENCOUNTER
Dr. Morales    Need actual order from you with amount/frequency/daily limit to flush the g-tube with      Kemi Huerta RN

## 2022-03-25 NOTE — TELEPHONE ENCOUNTER
I am unaware of specifics on previous orders, suggest they need to get recommendations from nutrition as to their protocol for such.  I will also forward message to them.

## 2022-03-25 NOTE — TELEPHONE ENCOUNTER
Patient Contact    Attempt # 1    Was call answered?  No.  Left message on voicemail with information to call me back.    Kemi Huerta RN

## 2022-03-28 ENCOUNTER — TELEPHONE (OUTPATIENT)
Dept: INTERNAL MEDICINE | Facility: CLINIC | Age: 42
End: 2022-03-28
Payer: COMMERCIAL

## 2022-03-28 NOTE — TELEPHONE ENCOUNTER
José Miguel from the [facility] calling states the amantadine is not on pt's current medication list.  Requesting a new med list signed and faxed to her.    Also wondering if pt is to be on midodrine 2.5 mg for blood pressure.  If so will need a new rx sent to [facility] pharmacy and fax order to the [facility].    [facility] fax #: [phone]    José Miguel can be reached at [phone]    Pharmacy pended.    Sonia KERNS RN  EP Triage

## 2022-03-29 ENCOUNTER — PATIENT OUTREACH (OUTPATIENT)
Dept: CARE COORDINATION | Facility: CLINIC | Age: 42
End: 2022-03-29
Payer: COMMERCIAL

## 2022-03-29 ENCOUNTER — TELEPHONE (OUTPATIENT)
Dept: CARDIOLOGY | Facility: CLINIC | Age: 42
End: 2022-03-29
Payer: COMMERCIAL

## 2022-03-29 DIAGNOSIS — G25.9 EXTRAPYRAMIDAL RIGIDITY: Primary | ICD-10-CM

## 2022-03-29 NOTE — LETTER
[address] [place] [address]    [facility]  Patient Centered Plan of Care  About Me:        Patient Name:  Scot Bishop    YOB: 1980  Age:         41 year old   Heather MRN:    [id number] Telephone Information:  Home Phone [phone]   Mobile [phone]       Address:  [address] [facility] [facility] [address] Email address:  [email or web address]      Emergency Contact(s)    Name Relationship Lgl Grd Work Phone Home Phone Mobile Phone   1. AMOS BISHOP* Father Yes  [phone]   2. SUZI BISHOP* Mother No  [phone]   [facility]    [phone]    4. WALKER RAMAN *     [phone]   5. MITZYLOS Stepparent No  [phone]           Primary language:  English     needed? No   [employer]:  [phone] op. 1  Other communication barriers:Physical impairment    Preferred Method of Communication:     Current living arrangement: No data recorded  Mobility Status/ Medical Equipment: Dependent/Assisted by Another    Health Maintenance  Health Maintenance Reviewed: Up to date    My Access Plan  Medical Emergency 911   Primary Clinic Line   - [phone]   24 Hour Appointment Line [phone] or  [phone] ([phone]) (toll-free)   24 Hour Nurse Line [phone] (toll-free)   Preferred Urgent Care [facility], [phone]     Preferred Hospital [facility]  [phone]     Preferred Pharmacy [facility] - [place], [place] - [address] [place] [address]     Behavioral Health Crisis Line The National Suicide Prevention Lifeline at 1-228.243.5510 or 911     My Care Team Members  Patient Care Team       Relationship Specialty Notifications Start End    Cachorro Morales MD PCP - General Internal Medicine  1/19/21     Phone: [phone] Fax: [phone]         [address] [place] [address]    Scot Matamoros MD MD Cardiology  6/12/20      Phone: [phone] Fax: [phone]         [address] [facility] [address]    Cachorro Morales MD Assigned PCP   12/24/20     Phone: [phone] Fax: [phone]         [address] [facility] [address]    EVELINA Myles CM    2/5/21     [facility]    Phone: [phone]         José Miguel, group home     2/5/21     [facility] group home    Phone: [phone] Fax: [phone]        Melanie financial worker Financial Resource Worker   2/5/21     [facility]    Phone: [phone]         Elsa Rahman NP Assigned Behavioral Health Provider   2/14/21     Phone: [phone] Fax: [phone]         [address] [facility] [address]    Bibi Marcus MD Assigned Heart and Vascular Provider   3/17/21     Phone: [phone] Pager: [phone] Fax: [phone]        [address] [facility] [address]    Candace Perez RN Lead Care Coordinator  Admissions 8/19/21             My Care Plans  Self Management and Treatment Plan  Goals and (Comments)  Goals        General     Functional (pt-stated)      Notes - Note edited  3/29/2022 12:45 PM by Candace Perez RN     Goal Statement: Patient will have a plan for future rehabilitation services after home care PT ends which would include outpatient Physical Therapy  Date Goal set: 8/20/2021  Barriers: Deconditioned   Strengths:Supportive parents   Date to Achieve By: 12/20/2021 // extended to 6/20/2022  Patient expressed understanding of goal: Father agrees with the plan   Action steps to achieve this goal:  1. I/caregiver will keep future appointments  2. I/caregiver will discuss, review, schedule and complete recommended overdue health maintenance with my primary care provider  3. I/caregiver will contact my care team with questions, concerns, support needs   4. I/caregiver will use the clinic as a resource, and I understand I can contact my clinic with 24/7 after hours services  available   5. Care Coordinator will remain available as needed               Action Plans on File:      Advance Care Plans/Directives Type:   Advanced Directive - On File    My Medical and Care Information  Problem List   Patient Active Problem List   Diagnosis     Cardiac arrest (H)     Traumatic brain injury (H)     Thrush, oral     Nicotine dependence     Gastrojejunostomy tube status (H)     G tube feedings (H)     Advance care planning     Dysphagia     Cognitive disorder     Back injury, sequela     Agitation     Aggression     Acute respiratory failure with hypoxia (H)     Thrombocytopenia, unspecified (H)     CARDIOVASCULAR SCREENING; LDL GOAL LESS THAN 100     Hypotension, unspecified hypotension type      Current Medications and Allergies:  No Known Allergies  Current Outpatient Medications   Medication     acetaminophen (TYLENOL) 325 MG tablet     amantadine (SYMMETREL) 50 MG/5ML oral solution     aspirin (ASPIRIN LOW DOSE) 81 MG chewable tablet     atorvastatin (LIPITOR) 40 MG tablet     bacitracin 500 UNIT/GM external ointment     benztropine (COGENTIN) 0.5 MG tablet     clindamycin (CLEOCIN) 300 MG capsule     docusate sodium (COLACE) 100 MG capsule     Emollient (AQUAPHOR ADVANCED THERAPY) OINT     hydrOXYzine (ATARAX) 10 MG tablet     LORazepam (ATIVAN) 0.5 MG tablet     metoprolol tartrate (LOPRESSOR) 25 MG tablet     midodrine (PROAMATINE) 2.5 MG tablet     Neomycin-Bacitracin-Polymyxin (TRIPLE ANTIBIOTIC) 3.5-400-5000 OINT ointment     nystatin (MYCOSTATIN) 690312 UNIT/GM external cream     OLANZapine (ZYPREXA) 2.5 MG tablet     PARoxetine (PAXIL) 30 MG tablet     sodium chloride 1 GM tablet     ticagrelor (BRILINTA) 90 MG tablet     Valproate Sodium (VALPROIC ACID) 250 MG/5ML     No current facility-administered medications for this visit.         Care Coordination Start Date: 8/20/2021   Frequency of Care Coordination: monthly     Form Last Updated: 03/29/2022

## 2022-03-29 NOTE — TELEPHONE ENCOUNTER
José Miguel called to clarify if this pt is still supposed to be taking amantadine.  She reports the ER doc discontinued it, but Elsa did a PA for it, so she is unsure.  Please call [phone]

## 2022-03-29 NOTE — PROGRESS NOTES
Clinic Care Coordination Contact  [facility]/Voicemail    Referral Source: Care Team  Clinical Data: Care Coordinator Outreach  Outreach attempted x 1.  Left message on patient's father's voicemail with call back information and requested return call.    Plan:  Care Coordinator will try to reach patient again in 10 business days.     Candace Perez RN, BSN, PHN  Primary Care / Care Coordinator   [facility]  E-mail [email or web address]   [phone]

## 2022-03-29 NOTE — TELEPHONE ENCOUNTER
[facility] Call Center    Phone Message    May a detailed message be left on voicemail: yes     Reason for Call: Other: midodrine (PROAMATINE) 2.5 MG tablet shows on pt's My Chart but the group home does not have an order for it.  metoprolol tartrate (LOPRESSOR) 25 MG tablet needs a DC Order.   Please send orders for each of these meds.    Action Taken: Message routed to:  [facility]    Travel Screening: Not Applicable

## 2022-03-29 NOTE — TELEPHONE ENCOUNTER
It appears that both of those medications have not been prescribed by me.  Needs to be a verification as to who is the prescribing physician and the prescription refill should be forwarded to such.

## 2022-03-29 NOTE — TELEPHONE ENCOUNTER
Patient Contact    Attempt # 2    Was call answered?  No.  Left message on voicemail with information to call me back.       Closing encounter due to 2 failed attempts to reach Group Home.     If Group Home returns call, relay following recommendations:    See recommendations as orders:    Patient's fluid flush were 180 mL 8 times per day   9 AM, 11 AM, 1 PM 3 PM 5 PM 7 PM     If not taking any fluids would recommend additional water flush (180 mL) at 7 AM and 9 PM     Total fluids 1800 mL (24 mL/kg ABW).

## 2022-03-29 NOTE — TELEPHONE ENCOUNTER
Upon chart review, Midodrine was last filled by Elsa Stout, a hospitalist at [facility]. Patient was to follow-up with Cardiologist upon discharge back in January of 2022. Per Cardiologist note from 2/8/22, unclear about whether patient should be taking Midodrine and Amantadine and at what dose/frequency.    Routing to patient's cardiologist for review.     Elizabeth Marie RN

## 2022-03-29 NOTE — TELEPHONE ENCOUNTER
RN called patient's home care facility and faxed over needed documentation. They will call us back if they need any further clarification.

## 2022-03-30 ENCOUNTER — TELEPHONE (OUTPATIENT)
Dept: BEHAVIORAL HEALTH | Facility: CLINIC | Age: 42
End: 2022-03-30
Payer: COMMERCIAL

## 2022-03-30 ENCOUNTER — TELEPHONE (OUTPATIENT)
Dept: CARDIOLOGY | Facility: CLINIC | Age: 42
End: 2022-03-30
Payer: COMMERCIAL

## 2022-03-30 NOTE — TELEPHONE ENCOUNTER
Huddled with PCP regarding orders for GT water flushes.    Orders are to stay as is for 6 scheduled daily water flushes of 180mL,  plus 2 additional water flushes of 180mL PRN if patient is not taking any fluids.     Patient's fluid flush were 180 mL 8 times per day   9 AM, 11 AM, 1 PM 3 PM 5 PM 7 PM     If not taking any fluids would recommend additional water flush (180 mL) at 7 AM and 9 PM     Total fluids 1800 mL (24 mL/kg ABW).       Patient Contact    Attempt # 1    Was call answered?  No.  Left detailed message on confidential voicemail with information to call clinic back if any further questions.

## 2022-03-30 NOTE — TELEPHONE ENCOUNTER
[facility] Call Center    Phone Message    May a detailed message be left on voicemail: yes     Reason for Call: Other:      José Miguel would like a call back to discuss if fax was received for medication change    Action Taken: Message routed to:  [facility]io    Travel Screening: Not Applicable

## 2022-03-30 NOTE — TELEPHONE ENCOUNTER
RN returned call to José Miguel regarding Amantadine discontinuation. José Miguel agreed.    Will need Discontinuation form sent to fax  [phone]    Elsa can you please discontinue medication from med list and send back to [facility] RN box?    Harmony Gonsales RN on 3/30/2022 at 2:41 PM

## 2022-03-30 NOTE — TELEPHONE ENCOUNTER
Spoke with José Miguel, and advised her that we will notify group home tomorrow if order was received in clinic. Orders were faxed to correct fax number. Will then get Dr. Marcus to sign orders once they are received.

## 2022-03-30 NOTE — TELEPHONE ENCOUNTER
José Miguel is request status of PRN orders to flush G-tube. She needs written orders faxed stating PCP approves G tube flush PRN.    Triage faxed 03/25/2022 written order from doctor Carmen below to [phone].    She asked that order states flush PRN.     Doctor Carmen, ok to flush G-tube PRN? If yes, triage please add that to order below and refax to José Miguel at [phone].    See recommendations as orders:    Patient's fluid flush were 180 mL 8 times per day   9 AM, 11 AM, 1 PM 3 PM 5 PM 7 PM     If not taking any fluids would recommend additional water flush (180 mL) at 7 AM and 9 PM     Total fluids 1800 mL (24 mL/kg ABW).

## 2022-03-30 NOTE — TELEPHONE ENCOUNTER
RN called José Miguel - to update with Elsa's recommendations. RN left message to call back       Harmony Gonsales RN on 3/30/2022 at 9:25 AM

## 2022-03-31 ENCOUNTER — TELEPHONE (OUTPATIENT)
Dept: CARDIOLOGY | Facility: CLINIC | Age: 42
End: 2022-03-31
Payer: COMMERCIAL

## 2022-03-31 DIAGNOSIS — E78.5 HLD (HYPERLIPIDEMIA): ICD-10-CM

## 2022-03-31 DIAGNOSIS — I95.9 HYPOTENSION, UNSPECIFIED HYPOTENSION TYPE: ICD-10-CM

## 2022-03-31 RX ORDER — ATORVASTATIN CALCIUM 40 MG/1
TABLET, FILM COATED ORAL
Qty: 90 TABLET | Refills: 2 | Status: SHIPPED | OUTPATIENT
Start: 2022-03-31 | End: 2022-12-07

## 2022-03-31 RX ORDER — METOPROLOL TARTRATE 25 MG/1
TABLET, FILM COATED ORAL
Qty: 28 TABLET | Refills: 3 | OUTPATIENT
Start: 2022-03-31

## 2022-03-31 RX ORDER — ATORVASTATIN CALCIUM 40 MG/1
TABLET, FILM COATED ORAL
Qty: 28 TABLET | Refills: 3 | OUTPATIENT
Start: 2022-03-31

## 2022-03-31 NOTE — TELEPHONE ENCOUNTER
Elsa Rahman NP  Psych Rn - Janey 1 hour ago (9:49 AM)     [place]    That helps - yes - discontinue the amantadine    Message text      Harmony Gonsales RN Therkildsen, Vicky Lynn, NP 19 hours ago (3:43 PM)     LJ Hunter -   He was in the ER who discontinued the Amantadine - Does that help?     Message text      Elsa Rahman NP  Psych Tommy - twyla 19 hours ago (3:36 PM)     [place]    If he is not on the cogentin then he should take the amantadine - I am a little confused by this. Please clarify with pharmacy    Message text

## 2022-03-31 NOTE — TELEPHONE ENCOUNTER
[facility] Call Center    Phone Message    May a detailed message be left on voicemail: yes     Reason for Call: Other: Pharmacy called in stating they received a message that metoprolol has been D/c'd. They do need an order for this to stop dispensing as group home is also unaware of change. Please c/b to discuss [phone]     Action Taken: Message routed to:  Other: [facility] cardio    Travel Screening: Not Applicable

## 2022-03-31 NOTE — TELEPHONE ENCOUNTER
RN called and attempted to contact pharmacy x2 to advise med was d'cd. RN received voice recording listing pharmacy hours and then the phone line was disconnected. No option to leave . RN will attempt again tomorrow.         See previous encounter. RN has been in communication with [facility] about stopping metoprolol. We have form and are waiting on discontinue order to be signed, but RN at [facility] was updated patient should not be taking metoprolol per Dr. Marcus.

## 2022-04-01 ENCOUNTER — MEDICAL CORRESPONDENCE (OUTPATIENT)
Dept: HEALTH INFORMATION MANAGEMENT | Facility: CLINIC | Age: 42
End: 2022-04-01
Payer: COMMERCIAL

## 2022-04-01 RX ORDER — BENZTROPINE MESYLATE 0.5 MG/1
0.5 TABLET ORAL 2 TIMES DAILY
Qty: 60 TABLET | Refills: 0
Start: 2022-04-01 | End: 2022-04-22

## 2022-04-01 RX ORDER — MIDODRINE HYDROCHLORIDE 2.5 MG/1
2.5 TABLET ORAL
Qty: 270 TABLET | Refills: 2 | Status: SHIPPED | OUTPATIENT
Start: 2022-04-01 | End: 2022-12-07

## 2022-04-01 NOTE — TELEPHONE ENCOUNTER
Pharmacy called and advised they just needed refill for midodrine. RN sent in refill as requested.

## 2022-04-01 NOTE — PATIENT INSTRUCTIONS
Lorazepam 0.5 mg twice daily    Hydroxyzine 10 mg twice daily as needed for appointments    Depakote 500 mg twice daily    Paroxetine 30 mg daily        Continue all other medications as reviewed per electronic medical record today.     Safety plan reviewed. To the Emergency Department as needed or call after hours crisis line at [phone] or [phone]. [place] Crisis Text Line. Text [place] to 007868 or Suicide LifeLine Chat: suicideInnvotec Surgical.org/chat/    To schedule individual or family therapy, call [facility] at [phone].    Schedule an appointment with me in 6 weeks or sooner as needed. Call [facility] at [phone] to schedule.    Follow up with primary care provider as planned or for acute medical concerns.    Call the psychiatric nurse line with medication questions or concerns at [phone].    [employer] may be used to communicate with your provider, but this is not intended to be used for emergencies.    Crisis Resources:    National Suicide Prevention Lifeline: 172.810.3577 (TTY: 642.979.5446). Call anytime for help.  (www.suicidepreventionlifeline.org)  National [place] on Mental Illness (www.melvin.org): 952.781.6142 or 406-745-0541.   Mental Health Association (www.mentalhealth.org): 937.642.1175 or 573-712-5301.  [place] Crisis Text Line: Text [place] to 830239  Suicide LifeLine Chat: [employer]Innvotec Surgical.org/chat

## 2022-04-04 ENCOUNTER — TELEPHONE (OUTPATIENT)
Dept: PSYCHIATRY | Facility: CLINIC | Age: 42
End: 2022-04-04

## 2022-04-04 DIAGNOSIS — F34.81 DISRUPTIVE MOOD DYSREGULATION DISORDER (H): ICD-10-CM

## 2022-04-04 RX ORDER — PAROXETINE 30 MG/1
30 TABLET, FILM COATED ORAL EVERY MORNING
Qty: 30 TABLET | Refills: 0 | Status: SHIPPED | OUTPATIENT
Start: 2022-04-04 | End: 2022-04-22

## 2022-04-04 NOTE — TELEPHONE ENCOUNTER
Date of Last Office Visit: 2/16/22  Date of Next Office Visit: 4/22/22  No shows since last visit: none  Cancellations since last visit: none    Medication requested: paroxetine 30mg Date last ordered: 12/8/22 Qty: 30 Refills: 3     Lapse in medication adherence greater than 5 days?: no  If yes, call patient and gather details:   Medication refill request verified as identical to current order?: yes  Result of Last DAM, VPA, Li+ Level, CBC, or Carbamazepine Level (at or since last visit): N/A    Last visit treatment plan:   Treatment Plan:          Lorazepam 0.5 mg twice daily    Hydroxyzine 10 mg twice daily as needed for appointments    Depakote 500 mg twice daily    Paroxetine 30 mg daily         Continue all other medications as reviewed per electronic medical record today.     Safety plan reviewed. To the Emergency Department as needed or call after hours crisis line at [phone] or [phone]. [place] Crisis Text Line. Text [place] to 915252 or Suicide LifeLine Chat: suicidepreventionlifeline.org/chat/    To schedule individual or family therapy, call [facility] at [phone].    Schedule an appointment with me in 6 weeks or sooner as needed. Call [facility] at [phone] to schedule.    Follow up with primary care provider as planned or for acute medical concerns.    Call the psychiatric nurse line with medication questions or concerns at [phone].    [employer] may be used to communicate with your provider, but this is not intended to be used for emergencies.      [x]Medication refilled per  Medication Refill in Ambulatory Care  policy.  []Medication unable to be refilled by RN due to criteria not met as indicated below:    []Eligibility - not seen in the last year   []Supervision - no future appointment   []Compliance - no shows, cancellations or lapse in therapy   []Verification - order discrepancy   []Controlled medication   []Medication not included in  policy   []90-day supply request   []Other

## 2022-04-04 NOTE — LETTER
[facility] MENTAL HEALTH & ADDICTION [facility]  [address] [address]  Phone: [phone]     2022    Re: Scot Bishop (: 1980)      Discontinue amantadine.        Elsa Rahman, SONNYP-BC  Psychiatric mental health nurse practitioner-Board certified

## 2022-04-05 NOTE — TELEPHONE ENCOUNTER
Elsa Rahman NP  Psych Rn - [facility] 11 minutes ago (10:06 AM)     [place]    Letter available in E HR    Message text      Hamrony Gonsales RN Therkildsen, Vicky Lynn, NP 20 hours ago (1:46 PM)     [place]    The facility is needing something in writing from you for this discontinuation    Routing comment      Called  back and left voice message.

## 2022-04-13 ENCOUNTER — HOSPITAL ENCOUNTER (OUTPATIENT)
Dept: NUTRITION | Facility: CLINIC | Age: 42
Discharge: HOME | End: 2022-04-13
Attending: DIETITIAN, REGISTERED | Admitting: DIETITIAN, REGISTERED
Payer: COMMERCIAL

## 2022-04-13 VITALS — WEIGHT: 164.4 LBS | BODY MASS INDEX: 25 KG/M2

## 2022-04-13 PROCEDURE — 97803 MED NUTRITION INDIV SUBSEQ: CPT | Mod: GT,95 | Performed by: DIETITIAN, REGISTERED

## 2022-04-13 NOTE — PROGRESS NOTES
Video-Visit Details    Type of service:  Video Visit    Video Start Time (time video started): 12:57    Video End Time (time video stopped): 1:10    Originating Location (pt. Location): Other [facility]     Distant Location (provider location):  [facility] NUTRITION SERVICES     Mode of Communication:  Video Conference via [facility]    OUTPATIENT NUTRITION ASSESSMENT (VIDEO VISIT DUE TO COVID-19)   REASON FOR ASSESSMENT  Scot Bishop referred by Dr. Morales for MNT related to   Traumatic brain injury with loss of consciousness, sequela (H) [S06.9X9S]  - Primary       G tube feedings (H) [Z93.1]          Patient accompanied by Gerald, father; step mother; staff director, José Miguel WATSON   Nutrition History:  - Information obtained from father, step mom and staff worker. Patient's step mom describes patient's eating habits as limited food choices with hamburgers and pizza but now eats all foods provided to him. Patient's father states that when they feed him, patient will eat most of his meal.  Priyanka, staff worker, states patient eats 45-50% of meals.  Patient currently living at [facility] in [place].  Patient has 7 year old son.      Patient's father states patient's  lbs. Patient is on a regular diet with thin liquids.  Patient is a total feed.     Patient has regular formed bowel movements either daily or every other day.       7/5/2021 Received calorie count from group [facility].  Patient eating 100% of meals.  Patient receiving 2 meals per day as  states patient will vomit if given breakfast after TF administration.  Patient having difficulty drinking liquids as will chew on cups or straws.  Patient's father bought sippy cup for patient to try and seems to do well with it. Patient will drink 16 oz fluid (orally) water and milk.  Patient receiving 3 (60 mL) water flushes 8 times per day.   asking if evening flushes can be changed due to  prevent waking patient 3 times per night.        7/19 No calorie count provided by [facility].  Diet recall past 24 hours: scrambled egg and yogurt; tuna sandwich, banana and water; spaghetti with shrimp, orange and milk-drank 25%. Patient continues to drink with sippy cup.  Patient's father states can drink entire sippy cup (8-10 oz in 1 hour). Staff members don't feel patient will drink more than 8 oz per day from sippy cup. Patient does not eat full breakfast due to previous vomiting after the TF.  Per father, patient working with speech therapist, PT and OT weekly.      8/2/2021 Patient's step mother states that patient will drink entire sippy cup of 8 oz if someone helps him.  [facility], states did not receive order to reduce TF so has continued to run at 70 mL/hr x 11 hours. Patient having regular daily stools.       8/18/2021 Received food log from [facility].  Patient eating 2-3 meals + 1-2 snacks (yogurt or applesauce).  Pancakes for breakfast.  Lunch -turkey sandwich or spaghetti with meat sauce Dinner: chicken vale.  Parents state patient can drink liquids when continually offered.  Father asking if patient can use different cup to increase fluid intake.       9/13/2021 Patient continues on current TF regimen of Isosource 1.5 @ 70 mL/hr x 11 hours.  Staff have been increasing protein intake in diet to help improve protein intake.  Note patient to participate in [employer] for OT/PT/ST.  Staff using syringe to provide fluid to remove food from teeth and oral cares.  Patient given water through syringe for fluids also.        10/18/2021 Patient's TF was reduced to 35 mL/hr x 11 hours.  Patient having daily-every other day bowel movement.  Patient will occasionally have constipation and then will be given protocol for constipation.  Patient will eat all foods given to him.  Patient having sippy cup for fluids.  [facility] states receiving 6-8 oz fluid at meals.   Patient will be given water flush through syring into mouth 30 mL per time. Continues to receive 180 mL every 2 hours during the day (7 AM-8 PM).      11/22/2021 Patient will have swallow evaluation in December.  Patient continues with same TF regimen.  Group home director states water flush used to swish mouth not as way to provide fluid needs.      1/26/2022 Patient has been able to drink fluid out of water bottle. May drink 16 oz fluids with meals. Patient has speech evaluation and able to switch cup on how to drink fluids.  Per staff, patient will eat all foods provided to him.  Patient eating 3 meals + 2 snacks.  Received food log from [facility]. RD received food logs without recorded portion sizes.       2/23/2022 Received food logs from group home.  Tracked oral intake of minimum 48 oz fluid per day. Per records, fluid output in am 1400 mL and 1600 mL in PM.  Patient has not been receiving tube feedings for the past 3-4 weeks.  Patient's weight remains stable at 168 lbs.  Food records do not include portion sizes so unable to accurately assess total calorie consumption.  Patient eats 100% of meals and snacks provided to him.      4/13/2022 Patient has stopped drinking liquids.  Patient's step mother offered patient water from bottle and patient drank 1 sip and then turned his head away.  Per parents and staff, patient will eat all foods offered to him.  Patient ate 6 chicken nuggets, french fries and 1 jalapeno popper for lunch.  Patient receiving water flushes every 2 hours.  Group home director states patient is voiding and has good urine color now that receiving regular water flushes.  RD did not receive food logs for past month.       Typical intake 2/15-2/22/2022  Breakfast: waffles and eggs  Snack: crackers or yogurt or pudding cup and orange  Lunch: meat sandwich  Snack: yogurt or crackers or pudding  Dinner: tilapia, rice and broccoli or chicken sandwich + yogurt and orange juice    "     Exercise: Patient working with PT.      NUTRITION FOCUSED PHYSICAL ASSESSMENT (NFPA) FOR DIAGNOSING MALNUTRITION  Yes         Observed:   No nutrition-related physical findings observed     Obtained from Chart/Interdisciplinary Team:  None noted      LABS  Labs reviewed     MEDICATIONS/SUPPLEMENTS  Medications/supplements reviewed-no MVI      ANTHROPOMETRICS   Height: 5'8\"  Weight: 164.6 lbs (4/13/2022)  BMI (kg/m2): 25.1 kg/m2  Weight Status:  Overweight   %IBW: 106%  Weight History: per group home weights:  168.2 (1/26/2022)  170.8 lbs (1/4/2022)  168.4 lbs (12/28/2021)  165.8 lbs (12/21/2021)  166.8 lbs (12/14/2021)   172 lbs (11/22/2021)  179.6 lbs (10/18/2021)  Wt Readings from Last 10 Encounters:   04/13/22 74.6 kg (164 lb 6.4 oz)   02/23/22 76.6 kg (168 lb 12.8 oz)   02/08/22 81.6 kg (180 lb)   01/17/22 81.6 kg (180 lb)   11/22/21 78 kg (172 lb)   10/18/21 81.5 kg (179 lb 9.6 oz)   09/13/21 83.1 kg (183 lb 3.2 oz)   08/18/21 80.7 kg (178 lb)   08/02/21 80.4 kg (177 lb 3.2 oz)   07/19/21 83.7 kg (184 lb 9.6 oz)      ASSESSED NUTRITION NEEDS  Estimated Energy Needs: 4938-4398 kcals/day (20-25 Kcal/Kg)  Justification:  (maintenance)  Estimated Protein Needs: 70-84 grams protein/day (1.0-1.2 g pro/Kg)  Justification:  (preservation of lean body mass)  Estimated Fluid Needs: 5544-0610 mL/day (30-35 mL/kg)      NUTRITION SUPPORT ENTERAL:  Type of Feeding Tube: G tube   Enteral Frequency:  Cyclic  Enteral Regimen: Isosource 1.5   Total Enteral Provisions:Isosource 1.5 at 35 mL/hr x 11 hours -7 PM - 8 AM) to provide 575 calories, 26 grams protein, 292 mL free fluid and 6 grams fiber  1440 water flush + 292 free water from TF + 540 mL oral fluids = 2272 mL (28 mL/kg ABW)  Adjust fluid flushes 180 mL water flush 8 times per day   180 mL water flush before and after tube feeding (8 PM and 7 AM)  180 mL water flush 9 AM, 11 AM, 1 PM, 3 PM, 5 PM, 7 PM       ASSESSED MALNUTRITION STATUS  % Weight Loss:  None noted  % " Intake:  No decreased intake noted  Subcutaneous Fat Loss:  None observed  Loss of Muscle Mass:  None observed  Fluid Retention:  None noted     Malnutrition Diagnosis:  Patient does not meet two of the above criteria necessary for diagnosing malnutrition     EVALUATION/PROGRESS TOWARDS GOALS  Previous nutrition goals:  Recommend discontinue tube feeding at night-met  Recommend discontinue water flushes-not met   8 oz milk 3 times per day at meals-not met   16 oz water from breakfast to lunch-not met   16 oz water from lunch to dinner-not met   16 oz water from dinner to bed-not met   Weekly weights-improving   Start complete multivitamin and mineral-not met      Previous nutrition diagnosis: Excessive nutrient intake related to G-tube feeding and oral intake as evidenced by previous 15% weight gain in 8 months and recent 6% weight loss with reduction on TF regimen -resolved      Current nutrition diagnosis: Inability to drink related to lack of interest as evidenced for need of G-tube water flushes to meet hydration needs      INTERVENTIONS   Nutrition Prescription   Recommend 7 day calorie count to assess oral intake to wean TF; add protein source at breakfast; protein bar as snack      IMPLEMENTATION   Assessed learning needs and learning preference  Teaching Method(s) used: Explanation  Vitamin and Mineral Supplements: suggest complete multivitamin and mineral once weaned off TF  Diet Education:  Provided education on calorie intake   Nutrition Education (Content):              a)  Discussed progress towards goals.  Reviewed intake.  Patient has been off TF for 8-10 weeks.  Patient's weight has been stable and within normal limits.  Since last RD appointment, patient has stopped drinking liquids from bottle so G-tube water flushes were resumed.  Patient receiving 1440 mL fluid from water flushes.  Patient requires 6539-7103 mL (60-75 oz) 70-80 fluids per day. Recommend patient follow up once patient able to  drink po/willing to drink po to assess fluid intake.  Patient's family and staff member verbalizes understanding of next steps.     GOALS  Continue water flushes 180 mL 8 times per day   Weekly weights  Start complete multivitamin and mineral      FOLLOW UP/MONITORING   Progress towards goals will be monitored and evaluated per protocol and Practice Guidelines  Guardian or group home staff to follow up if patient has 5 lb weight loss in 6 months.  RD name and number provided      Time Spent with Patient  13 minutes     Lauryn Licona, RD, LD  [facility] Outpatient Dietitian  [phone] (office phone)

## 2022-04-15 NOTE — TELEPHONE ENCOUNTER
Writer returned call to [facility] regarding discontinuation letter for amantadine. There was no answer. Message left notifying them that the letter is available via [employer] or to call us back to have the letter faxed or mailed per their preference. Consent to communicate active in chart.     Mari Garcia RN on 4/15/2022 at 2:42 PM

## 2022-04-20 ENCOUNTER — MEDICAL CORRESPONDENCE (OUTPATIENT)
Dept: HEALTH INFORMATION MANAGEMENT | Facility: CLINIC | Age: 42
End: 2022-04-20
Payer: COMMERCIAL

## 2022-04-22 ENCOUNTER — VIRTUAL VISIT (OUTPATIENT)
Dept: PSYCHIATRY | Facility: CLINIC | Age: 42
End: 2022-04-22
Payer: COMMERCIAL

## 2022-04-22 ENCOUNTER — VIRTUAL VISIT (OUTPATIENT)
Dept: BEHAVIORAL HEALTH | Facility: CLINIC | Age: 42
End: 2022-04-22
Payer: COMMERCIAL

## 2022-04-22 DIAGNOSIS — R69 PSYCHIATRIC DIAGNOSIS DEFERRED: Primary | ICD-10-CM

## 2022-04-22 DIAGNOSIS — F33.0 DEPRESSION, MAJOR, RECURRENT, MILD (H): Primary | ICD-10-CM

## 2022-04-22 PROCEDURE — 99207 PR NO BILLABLE SERVICE THIS VISIT: CPT | Performed by: NURSE PRACTITIONER

## 2022-04-22 RX ORDER — VALPROIC ACID 250 MG/5ML
250 SOLUTION ORAL 2 TIMES DAILY
Qty: 300 ML | Refills: 1 | Status: SHIPPED | OUTPATIENT
Start: 2022-04-22 | End: 2022-06-02

## 2022-04-22 RX ORDER — HYDROXYZINE HYDROCHLORIDE 10 MG/1
10 TABLET, FILM COATED ORAL DAILY PRN
Qty: 30 TABLET | Refills: 3 | Status: SHIPPED | OUTPATIENT
Start: 2022-04-22 | End: 2023-02-02

## 2022-04-22 RX ORDER — PAROXETINE 30 MG/1
30 TABLET, FILM COATED ORAL EVERY MORNING
Qty: 30 TABLET | Refills: 1 | Status: SHIPPED | OUTPATIENT
Start: 2022-04-22 | End: 2022-05-31

## 2022-04-22 RX ORDER — LORAZEPAM 0.5 MG/1
0.25 TABLET ORAL
Qty: 30 TABLET | Refills: 1 | Status: SHIPPED | OUTPATIENT
Start: 2022-04-22 | End: 2022-05-31

## 2022-04-22 NOTE — PROGRESS NOTES
[facility] Collaborative Care Psychiatry Service     2022      Behavioral Health Clinician Progress Note    Patient Name: Scot Bishop           Service Type:  Family with client present      Service Location:   MyChart / Email (patient reached)     Session Start Time: 352pm  Session End Time: 406pm      Session Length: 16 - 37      Attendees: Patient, Father and Mother     Service Modality:  Video Visit:      Provider verified identity through the following two step process.  Patient provided:  Patient  and Patient is known previously to provider    Telemedicine Visit: The patient's condition can be safely assessed and treated via synchronous audio and visual telemedicine encounter.      Reason for Telemedicine Visit: Services only offered telehealth    Originating Site (Patient Location): Patient's home    Distant Site (Provider Location): Provider Remote Setting- Home Office    Consent:  The patient/guardian has verbally consented to: the potential risks and benefits of telemedicine (video visit) versus in person care; bill my insurance or make self-payment for services provided; and responsibility for payment of non-covered services.     Patient would like the video invitation sent by:  My Chart    Mode of Communication:  Video Conference via [facility]    As the provider I attest to compliance with applicable laws and regulations related to telemedicine.    Visit Activities (Refresh list every visit): [facility] Only    Diagnostic Assessment Date: 2022  Treatment Plan Review Date: 2022  See Flowsheets for today's PHQ-9 and CLEMENT-7 results  Previous PHQ-9:   PHQ-9 SCORE 11/15/2021   PHQ-9 Total Score MyChart 4 (Minimal depression)   PHQ-9 Total Score 4     Previous CLEMENT-7:   CLEMENT-7 SCORE 2022   Total Score 12 (moderate anxiety)   Total Score 12       HEYDI LEVEL:  No flowsheet data found.    DATA  Extended Session (60+ minutes): No  Interactive Complexity: No  Crisis: No  [facility] Patient:  No    Treatment Objective(s) Addressed in This Session:  Target Behavior(s): reduce physical combative behaviors, food intake, attending appointments     Depressed Mood: Increase interest, engagement, and pleasure in doing things  Decrease frequency and intensity of feeling down, depressed, hopeless  Feel less tired and more energy during the day   reduce combative behaviors during interactions with staff    Current Stressors / Issues:  Scot's parents report that the care staff worker who usually attends the sessions was not rina to present due to her own health for today's meeting.      update: Pt dad's said that Scot has been doing well. Parents says that the physical therapist never got him to do a full stand, was close. Parents say that Scot was able to go to his physical therapists and he will be getting a new physical therapist. His parents express that they hope the new therapist will push Scot more since he never passed out or been close, but physical therapists are concerned of him passing out. His parents say that they were gone 5 days and then saw him today and was Scot was good while they were gone and doesn't appear to have any issues since they are back. He does not appear to be having any behavioral issues with staff.     Side Effects: none  Mood: been pretty good  Appetite: unremarkable, use to drink out of a water bottle and now he won't do it   Sleep: unremarkable    Interventions: supportive therapy   Medication Questions/Requests: review medications, drinking of water, low bp        Progress on Treatment Objective(s) / Homework:  Satisfactory progress - ACTION (Actively working towards change); Intervened by reinforcing change plan / affirming steps taken    Motivational Interviewing    MI Intervention: Co-Developed Goal: reduce combative behavior, Expressed Empathy/Understanding, Supported Autonomy, Collaboration, Evocation, Permission to raise concern or advise, Open-ended questions,  Reflections: simple and complex, Change talk (evoked) and Reframe     Change Talk Expressed by the Patient: Need to change Committment to change Activation Taking steps    Provider Response to Change Talk: E - Evoked more info from patient about behavior change, A - Affirmed patient's thoughts, decisions, or attempts at behavior change, R - Reflected patient's change talk and S - Summarized patient's change talk statements    Also provided psychoeducation about behavioral health condition, symptoms, and treatment options    Care Plan review completed: Yes    Medication Review:  No changes to current psychiatric medication(s)    Medication Compliance:  Yes    Changes in Health Issues:   None reported    Chemical Use Review:   Substance Use: Chemical use reviewed, no active concerns identified      Tobacco Use: No current tobacco use.      Assessment: Current Emotional / Mental Status (status of significant symptoms):  Risk status (Self / Other harm or suicidal ideation)  Patient denies a history of suicidal ideation, suicide attempts, self-injurious behavior, homicidal ideation, homicidal behavior and and other safety concerns  Patient denies current fears or concerns for personal safety.  Patient denies current or recent suicidal ideation or behaviors.  Patient denies current or recent homicidal ideation or behaviors.  Patient denies current or recent self injurious behavior or ideation.  Patient denies other safety concerns.  A safety and risk management plan has not been developed at this time, however patient was encouraged to call [place] [phone] should there be a change in any of these risk factors.    Appearance:   Appropriate   Eye Contact:   Good   Psychomotor Behavior: Normal   Attitude:   Cooperative  Friendly Pleasant  Orientation:   All  Speech   Rate / Production: Normal    Volume:  Normal   Mood:    Normal, happy while parents talked about Friend's episode and the surprise  Affect:    Appropriate    Thought Content:  Clear   Thought Form:  Coherent  Logical   Insight:    Good     Diagnoses:  1. Depression, major, recurrent, mild (H)        Collateral Reports Completed:  Communicated with:   Communicated with M[facility]    Plan: (Homework, other):  Patient was given information about behavioral services and encouraged to schedule a follow up appointment with the clinic [facility] same time as appt with Elsa.  He was also given information about mental health symptoms and treatment options .  CD Recommendations: No indications of CD issues.     Alena Abbott, MSW, [facility]   4/22/2022      ______________________________________________________________________    Integrated Primary Care Behavioral Health Treatment Plan    Patient's Name: Scot Bishop  YOB: 1980    Date of Creation: 3/18/2022  Date Treatment Plan Last Reviewed/Revised: 03/18/2022    DSM5 Diagnoses: 296.31 (F33.0) Major Depressive Disorder, Recurrent Episode, Mild _  Psychosocial / Contextual Factors: non-verbal, living in care center, doing physical therapy, low blood pressure  PROMIS (reviewed every 90 days):   PROMIS 10 Global Mental Health Score Global Physical Health Score   2/16/2022 5 10      PROMIS 10 PROMIS TOTAL - SUBSCORES   2/16/2022 15            Referral / Collaboration:  Referral to another professional/service is not indicated at this time.    Anticipated number of session for this episode of care: 6-10  Anticipation frequency of session: As determined by Elsa Rahman  Anticipated Duration of each session: 16-37 minutes  Treatment plan will be reviewed in 90 days or when goals have been changed.         MeasurableTreatment Goal(s) related to diagnosis / functional impairment(s)  Goal 1: Patient will reduce combative physical behaviors.    I will know I've met my goal when I am not longer kicking and fighting during interactions.       Objective #A (Patient Action)                           Patient will not act in physical aggression towards care staff during inteactions of dressing, transporting and other daily activities.  Status: New - Date: 03/18/2022      Intervention(s)  Therapist will provide support to care staff and Scot's parents each meeting. Will assist them to identify behavioral patterns and ways to reduce unwanted behavior through CBT and medication changes provided by psychiatrist         Parent / Guardian has reviewed and agreed to the above plan.        Alena Abbott, [facility]                    March 18, 2022

## 2022-04-27 ENCOUNTER — TELEPHONE (OUTPATIENT)
Dept: PSYCHIATRY | Facility: CLINIC | Age: 42
End: 2022-04-27
Payer: COMMERCIAL

## 2022-04-27 NOTE — TELEPHONE ENCOUNTER
----- Message from Elsa Rahman NP sent at 4/27/2022 12:14 PM CDT -----  Upon review of Scot's medication list from his residence, would you please contact them to add his mental health diagnosis on their: Adjustment disorder with anxious mood, F43.22

## 2022-04-27 NOTE — TELEPHONE ENCOUNTER
Called patient's home and spoke with Priyanka, staff at [facility] in [place] and informed her of the below noted directive from provider.

## 2022-05-09 ENCOUNTER — PATIENT OUTREACH (OUTPATIENT)
Dept: CARE COORDINATION | Facility: CLINIC | Age: 42
End: 2022-05-09
Payer: COMMERCIAL

## 2022-05-09 NOTE — PROGRESS NOTES
Clinic Care Coordination Contact  [facility]/Voicemail    Referral Source: Care Team  Clinical Data: Care Coordinator Outreach  Outreach attempted x 1.  Left message on patient's voicemail with call back information and requested return call.    Plan: Care Coordinator will try to reach patient again in 10-14  business days.     Candace Perez RN, BSN, PHN  Primary Care / Care Coordinator   [facility]  E-mail [email or web address]   [phone]

## 2022-05-25 ENCOUNTER — PATIENT OUTREACH (OUTPATIENT)
Dept: CARE COORDINATION | Facility: CLINIC | Age: 42
End: 2022-05-25
Payer: COMMERCIAL

## 2022-05-25 NOTE — PROGRESS NOTES
Clinic Care Coordination Contact  [facility]/Voicemail       Clinical Data: Care Coordinator Outreach  Outreach attempted x 2.  Left message on patient's fathers voicemail with call back information and requested return call.    Plan: Care Coordinator will try to reach patient again in 1 month.     Candace Perez RN, BSN, PHN  Primary Care / Care Coordinator   [facility]  E-mail [email or web address]   [phone]

## 2022-05-31 ENCOUNTER — MYC REFILL (OUTPATIENT)
Dept: PSYCHIATRY | Facility: CLINIC | Age: 42
End: 2022-05-31
Payer: COMMERCIAL

## 2022-05-31 DIAGNOSIS — F34.81 DISRUPTIVE MOOD DYSREGULATION DISORDER (H): ICD-10-CM

## 2022-05-31 RX ORDER — PAROXETINE 30 MG/1
30 TABLET, FILM COATED ORAL EVERY MORNING
Qty: 30 TABLET | Refills: 1 | Status: SHIPPED | OUTPATIENT
Start: 2022-05-31 | End: 2022-06-02

## 2022-05-31 RX ORDER — LORAZEPAM 0.5 MG/1
0.25 TABLET ORAL
Qty: 30 TABLET | Refills: 1 | Status: SHIPPED | OUTPATIENT
Start: 2022-05-31 | End: 2022-08-18

## 2022-05-31 NOTE — TELEPHONE ENCOUNTER
Requesting Paxil early for shipping purposes - Appears patient is in group home.    Date of Last Office Visit: 4/22/22 (please review notes)  Date of Next Office Visit: 6/2/22  No shows since last visit: 0  Cancellations since last visit: 0           Review of [place] ?:       Lapse in medication adherence greater than 5 days?: NO PRN  Medication refill request verified as identical to current order?: Yes   Result of Last DAM, VPA, Li+ Level, CBC, or Carbamazepine Level (at or since last visit): N/A    Last visit treatment plan: Unable to see treatment plan on 4/22/22    []Medication refilled per  Medication Refill in Ambulatory Care  policy.  [x]Medication unable to be refilled by RN due to criteria not met as indicated below:    []Eligibility - not seen in the last year   []Supervision - no future appointment   []Compliance - no shows, cancellations or lapse in therapy   []Verification - order discrepancy   [x]Controlled medication   [x]Medication not included in policy   []90-day supply request   []Other

## 2022-06-02 ENCOUNTER — VIRTUAL VISIT (OUTPATIENT)
Dept: PSYCHIATRY | Facility: CLINIC | Age: 42
End: 2022-06-02
Payer: COMMERCIAL

## 2022-06-02 ENCOUNTER — VIRTUAL VISIT (OUTPATIENT)
Dept: BEHAVIORAL HEALTH | Facility: CLINIC | Age: 42
End: 2022-06-02
Payer: COMMERCIAL

## 2022-06-02 DIAGNOSIS — F34.81 DISRUPTIVE MOOD DYSREGULATION DISORDER (H): ICD-10-CM

## 2022-06-02 DIAGNOSIS — F33.0 DEPRESSION, MAJOR, RECURRENT, MILD (H): Primary | ICD-10-CM

## 2022-06-02 DIAGNOSIS — F09 COGNITIVE DISORDER: Primary | ICD-10-CM

## 2022-06-02 DIAGNOSIS — F51.04 PSYCHOPHYSIOLOGICAL INSOMNIA: ICD-10-CM

## 2022-06-02 PROCEDURE — 99214 OFFICE O/P EST MOD 30 MIN: CPT | Mod: 95 | Performed by: NURSE PRACTITIONER

## 2022-06-02 PROCEDURE — 99207 PR NO BILLABLE SERVICE THIS VISIT: CPT | Performed by: COUNSELOR

## 2022-06-02 RX ORDER — PAROXETINE 30 MG/1
30 TABLET, FILM COATED ORAL EVERY MORNING
Qty: 30 TABLET | Refills: 1 | Status: SHIPPED | OUTPATIENT
Start: 2022-06-02 | End: 2022-08-18

## 2022-06-02 RX ORDER — VALPROIC ACID 250 MG/5ML
SOLUTION ORAL
Qty: 300 ML | Refills: 1 | Status: SHIPPED | OUTPATIENT
Start: 2022-06-02 | End: 2022-09-15

## 2022-06-02 NOTE — PROGRESS NOTES
"Scot is a 41 year old who is being evaluated via a billable video visit.      How would you like to obtain your AVS? MyChart  If the video visit is dropped, the invitation should be resent by: Text to cell phone: [phone]  Will anyone else be joining your video visit? Yes: parents. How would they like to receive their invitation? Text to cell phone: chandler Gutierres VF    Video Start Time: 12:47 PM  Video-Visit Details    Type of service:  Video Visit    Video End Time:1:15 PM    Originating Location (pt. Location): Home    Distant Location (provider location):  [facility] MENTAL [facility] & ADDICTION [facility]     Platform used for Video Visit: [facility]              Outpatient Psychiatric Progress Note    Name: Scot Bishop   : 1980                    Primary Care Provider: Cachorro Morales MD   Therapist: None    PHQ-9 scores:  PHQ-9 SCORE 11/15/2021   PHQ-9 Total Score MyChart 4 (Minimal depression)   PHQ-9 Total Score 4       CLEMENT-7 scores:  CLEMENT-7 SCORE 2022   Total Score 12 (moderate anxiety)   Total Score 12       Patient Identification:    Patient is a 41 year old year old, single  White Not  or  male  who presents for return visit with me.  Patient is currently disabled. Patient attended the session with his parents and residential staff member , who they agreed to have interview with. Patient prefers to be called: \" Scot\".    Current medications include: acetaminophen (TYLENOL) 325 MG tablet, Take 1 tablet (325 mg) by mouth every 6 hours as needed for mild pain or fever  aspirin (ASPIRIN LOW DOSE) 81 MG chewable tablet, TAKE ONE TABLET PER G TUBE EVERY DAY  atorvastatin (LIPITOR) 40 MG tablet, TAKE ONE TABLET PER G TUBE EVERY NIGHT AT BEDTIME  bacitracin 500 UNIT/GM external ointment,   clindamycin (CLEOCIN) 300 MG capsule, Take 1 capsule (300 mg) by mouth 3 times daily  docusate sodium (COLACE) 100 MG capsule, Take 1 capsule (100 mg) by mouth 2 times daily as " needed for constipation  Emollient (AQUAPHOR ADVANCED THERAPY) OINT, Apply topically 2 times daily As needed.  hydrOXYzine (ATARAX) 10 MG tablet, Take 1 tablet (10 mg) by mouth daily as needed for anxiety (30-60 minutes before appointments)  LORazepam (ATIVAN) 0.5 MG tablet, Take 0.5 tablets (0.25 mg) by mouth 2 times daily  midodrine (PROAMATINE) 2.5 MG tablet, 1 tablet (2.5 mg) by Oral or Feeding Tube route 3 times daily (with meals)  Neomycin-Bacitracin-Polymyxin (TRIPLE ANTIBIOTIC) 3.5-400-5000 OINT ointment, Externally apply topically 2 times daily as needed  nystatin (MYCOSTATIN) 912361 UNIT/GM external cream, Apply topically 2 times daily As needed  PARoxetine (PAXIL) 30 MG tablet, Take 1 tablet (30 mg) by mouth every morning  sodium chloride 1 GM tablet, Take 1 tablet (1 g) by mouth daily as needed (for low blood pressure before PT session)  ticagrelor (BRILINTA) 90 MG tablet, Take 90 mg by mouth  Valproate Sodium (VALPROIC ACID) 250 MG/5ML, Take 5 mLs (250 mg) by mouth 2 times daily    No current facility-administered medications on file prior to visit.       The [place] Prescription Monitoring Program has been reviewed and there are no concerns about diversionary activity for controlled substances at this time.      I was able to review most recent Primary Care Provider, specialty provider, and therapy visit notes that I have access to.     Today, patient reports that he interacts very little with his children.  His parents will try to take him out.  He makes sounds, yes or no, patient was nonverbal and inattentive to this interview.  While he engages in therapy, he is mostly wheelchair-bound.  He is incontinent.  Scot is able to eat food by chewing and swallowing it.  There has been some anger outbursts, but are occurring less often.     has a past medical history of Acute respiratory failure with hypoxia (H), Aggression (11/09/2020), Agitation (09/21/2020), LOWELL (acute kidney injury) (H), Back injury,  sequela (10/27/2017), Cardiac arrest (H) (05/17/2020), Cardiac arrest (H), Cognitive disorder (11/11/2020), Depressive disorder, Dysphagia (11/11/2020), Dysphagia, Gastrojejunostomy tube status (H) (11/11/2020), HTN (hypertension), Hypoxic ischemic encephalopathy, Low blood pressure (09/17/2020), Nonverbal (11/11/2020), NSTEMI (non-ST elevated myocardial infarction) (H), and TBI (traumatic brain injury) (H) (06/25/2020).    Social history updates:    No changes in Scot's social history    Substance use updates:    No alcohol use reported  Tobacco use: No    Vital Signs:   There were no vitals taken for this visit.    Labs:    Most recent laboratory results reviewed and no new labs.     Mental Status Examination:  Appearance: awake, alert and casual dress  Attitude: uncooperative  Eye Contact:  looking around room  Gait and Station: Uses wheelchair  Psychomotor Behavior:  fidgeting  Oriented to:  Person only  Attention Span and Concentration:  Easily distracted  Speech:   mute  Mood:  anxious  Affect:  intensity is heightened  Associations:  Unable to assess  Thought Process:  linear  Thought Content:  Unable to assess  Recent and Remote Memory:  poor Not formally assessed. No amnesia.  Fund of Knowledge: low  Insight:  limited  Judgment:  poor  Impulse Control:  poor    Suicide Risk Assessment:  Today Scot Bishop reports no thoughts to harm themself or others. In addition, there are notable risk factors for self-harm, including comorbid medical condition of Traumatic brain injury. However, risk is mitigated by absence of past attempts. Therefore, based on all available evidence including the factors cited above, Scot Bishop does not appear to be at imminent risk for self-harm, does not meet criteria for a 72-hr hold, and therefore remains appropriate for ongoing outpatient level of care.  A thorough assessment of risk factors related to suicide and self-harm have been reviewed and are noted above. The  patient convincingly denies suicidality on several occasions. Local community safety resources printed and reviewed for patient to use if needed. There was no deceit detected, and the patient presented in a manner that was believable.     DSM5 Diagnosis:  Cognitive impairment  Generalized anxiety disorder  Psychophysiological insomnia, episodic    Medical comorbidities include:   Patient Active Problem List    Diagnosis Date Noted     Hypotension, unspecified hypotension type 01/17/2022     Priority: Medium     CARDIOVASCULAR SCREENING; LDL GOAL LESS THAN 100 11/16/2021     Priority: Medium     Nicotine dependence 04/16/2021     Priority: Medium     Formatting of this note might be different from the original.  Created by Conversion       Thrombocytopenia, unspecified (H) 04/16/2021     Priority: Medium     Thrush, oral 12/09/2020     Priority: Medium     Advance care planning 12/04/2020     Priority: Medium     Formatting of this note might be different from the original.  Community Palliative Care was asked to see patient by inpatient palliative care on 11/23 for continuation of palliative care in a new setting.  Date of last visit: 12/9/2020    Formatting of this note is different from the original.  Advance Care Planning   Patient has identified Health Care Agent(s): Yes  Add Health Care Agents: Yes    Health Care Agent(s):  Guardian: Gerald Bishop Relationship: father Phone: [phone]     Patient has Advance Care Plan Documents (Health Care Directive, POLST): Yes    Advance Care Plan Documents:  POLST Form     Patient has identified Specific Treatment Preferences: Yes     How have preferences been verified: POLST    Specific Treatment Preferences:   a.) Code Status:  DNR/ Do Not Attempt Resuscitation - Allow a Natural Death  b.) Goals of Treatment:   ii. Selective Treatment: Use medical treatment, antibiotics, IV fluids and cardiac monitor as indicated. No intubation, advanced airway interventions, or  mechanical ventilation. May consider less invasive airway support (e.g. CPAP, BiPAP). Transfer to hospital if indicated. Generally avoid the intensive care unit. All patients will receive comfort-focused treatments.  TREATMENT PLAN: Provide basic medical treatments aimed at treating new or reversible illness.  c.) Interventions and Treatments:  i.  Artificially Administered Nutrition and Hydration: - Long term artificial nutrion/hydration by tube through (not specified) (specify mouth/nose) and (not specified) (specify if ok with directly into GI tract)  ii.  Antibiotics: - Oral antibiotics only (NO IV/IM)       Acute respiratory failure with hypoxia (H) 12/04/2020     Priority: Medium     Gastrojejunostomy tube status (H) 11/11/2020     Priority: Medium     Cognitive disorder 11/11/2020     Priority: Medium     Aggression 11/09/2020     Priority: Medium     Agitation 09/21/2020     Priority: Medium     G tube feedings (H) 09/03/2020     Priority: Medium     Dysphagia 08/04/2020     Priority: Medium     Traumatic brain injury (H) 06/25/2020     Priority: Medium     Cardiac arrest (H) 05/17/2020     Priority: Medium     Back injury, sequela 10/27/2017     Priority: Medium     Formatting of this note might be different from the original.  WC Case w/ [employer] (www.accidentfund.[employer])  CLM #[id number];  Lydia Machuca [phone]         Assessment:    Scot Bishop remains stable.  He is mute and incontinent.  He requires assistance to stand and transfer.  His parents are attending and continue to advocate for him to be on as little medication as possible.  Anger outbursts have leveled off.  Sleep is getting better..    Medication side effects and alternatives were reviewed. Health promotion activities recommended and reviewed today. All questions addressed. Education and counseling completed regarding risks and benefits of medications and psychotherapy options.    Treatment Plan:        1.   Lorazepam 0.5 mg twice daily    2.  Hydroxyzine 10 mg up to 3 times daily as needed before appointments or transitions    3.  Valproic acid 125 mg in the morning and 250 mg at bedtime    4.  Paroxetine 30 mg daily        Continue all other medications as reviewed per electronic medical record today.     Safety plan reviewed. To the Emergency Department as needed or call after hours crisis line at [phone] or [phone]. [place] Crisis Text Line. Text [place] to 131885 or Suicide LifeLine Chat: suicideOcean Aero.org/chat/    To schedule individual or family therapy, call [facility] at [phone]    Schedule an appointment with me in 2 months or sooner as needed. Call [facility] at [phone] to schedule.    Follow up with primary care provider as planned or for acute medical concerns.    Call the psychiatric nurse line with medication questions or concerns at [phone]    MyChart may be used to communicate with your provider, but this is not intended to be used for emergencies.    Crisis Resources:    National Suicide Prevention Lifeline: 474.119.1120 (TTY: 284.603.2076). Call anytime for help.  (www.suicidepreventionlifeline.org)  National [place] on [place]ness (www.melvin.org): 146.122.8468 or 078-605-9041.   Mental Health Association (www.mentalhealth.org): 432.338.9308 or 057-973-1074.  [place] Crisis Text Line: Text [place] to 748903  Suicide LifeLine Chat: suicideOcean Aero.org/chat    Administrative Billing:   Time spent with patient includes counseling and coordination of care regarding above diagnoses and treatment plan.    Patient Status:  Patient will continue to be seen for ongoing consultation and stabilization.    Signed:   LASHAWN Jeong-BC   Psychiatry

## 2022-06-02 NOTE — PROGRESS NOTES
[facility] Collaborative Care Psychiatry Service     June 1, 2022      Behavioral Health Clinician Progress Note    Patient Name: Scot Bishop           Service Type:  Family with client present      Service Location:   MyChart / Email (patient reached)     Session Start Time: 1213pm  Session End Time: 1224pm      Session Length: 11 minutes     Attendees: Patient, Father and Mother     Service Modality:  Video Visit:      Provider verified identity through the following two step process.  Patient provided:  Patient is known previously to provider    Telemedicine Visit: The patient's condition can be safely assessed and treated via synchronous audio and visual telemedicine encounter.      Reason for Telemedicine Visit: Services only offered telehealth    Originating Site (Patient Location): Patient's home    Distant Site (Provider Location): Provider Remote Setting- Home Office    Consent:  The patient/guardian has verbally consented to: the potential risks and benefits of telemedicine (video visit) versus in person care; bill my insurance or make self-payment for services provided; and responsibility for payment of non-covered services.     Patient would like the video invitation sent by:  My Chart    Mode of Communication:  Video Conference via Amwell    As the provider I attest to compliance with applicable laws and regulations related to telemedicine.    Visit Activities (Refresh list every visit): [facility] Only    Diagnostic Assessment Date: 2/16/2022  Treatment Plan Review Date: 6/18/2022  See Flowsheets for today's PHQ-9 and CLEMENT-7 results  Previous PHQ-9:   PHQ-9 SCORE 11/15/2021   PHQ-9 Total Score MyChart 4 (Minimal depression)   PHQ-9 Total Score 4     Previous CLEMENT-7:   CLEMENT-7 SCORE 2/16/2022   Total Score 12 (moderate anxiety)   Total Score 12       HEYDI LEVEL:  No flowsheet data found.    DATA  Extended Session (60+ minutes): No  Interactive Complexity: No  Crisis: No  [facility] Patient: No    Treatment  Objective(s) Addressed in This Session:  Target Behavior(s): drinking more and from a cup, continued positive mood, no combative behaviors     Depressed Mood: Increase interest, engagement, and pleasure in doing things  Decrease frequency and intensity of feeling down, depressed, hopeless      Current Stressors / Issues:   update: Pt's dad says that things are going well. Pt has done a full stand the last 2 times. His blood pressure is above the minimum they require for his physical therapy. Pt's dad takes him outside when they can. Pt's parents say there are no concerns transporting him to and from PT. Pt's dad says that he feels like the medication is at the right dose. Scot laughed multiple times during the meeting, especially when his dad asked him why he won't drink out of a bottle. His sister came to see him last week.   Stressors: none  Side Effects: none  Mood: good   Appetite: unremarkable, still not drinking water out of a bottle- using a syringe   Sleep: unremarkable     Caffeine: tried to drink out of syringe      Interventions: use Friends cup to see if this will entice him to drink water out of it   Medication Questions/Requests: none    Progress on Treatment Objective(s) / Homework:  Satisfactory progress - ACTION (Actively working towards change); Intervened by reinforcing change plan / affirming steps taken    Motivational Interviewing    MI Intervention: Co-Developed Goal: reduce combative behavior, Expressed Empathy/Understanding, Supported Autonomy, Collaboration, Evocation, Permission to raise concern or advise, Open-ended questions, Reflections: simple and complex, Change talk (evoked) and Reframe     Change Talk Expressed by the Patient: Need to change Committment to change Activation Taking steps    Provider Response to Change Talk: E - Evoked more info from patient about behavior change, A - Affirmed patient's thoughts, decisions, or attempts at behavior change, R - Reflected patient's  change talk and S - Summarized patient's change talk statements    Also provided psychoeducation about behavioral health condition, symptoms, and treatment options    Care Plan review completed: Yes    Medication Review:  No changes to current psychiatric medication(s)    Medication Compliance:  Yes    Changes in Health Issues:   None reported    Chemical Use Review:   Substance Use: Chemical use reviewed, no active concerns identified      Tobacco Use: No current tobacco use.      Assessment: Current Emotional / Mental Status (status of significant symptoms):  Risk status (Self / Other harm or suicidal ideation)  Patient denies a history of suicidal ideation, suicide attempts, self-injurious behavior, homicidal ideation, homicidal behavior and and other safety concerns  Patient denies current fears or concerns for personal safety.  Patient denies current or recent suicidal ideation or behaviors.  Patient denies current or recent homicidal ideation or behaviors.  Patient denies current or recent self injurious behavior or ideation.  Patient denies other safety concerns.  A safety and risk management plan has not been developed at this time, however patient was encouraged to call [place] [phone] should there be a change in any of these risk factors.    Appearance:   Appropriate   Eye Contact:   Good   Psychomotor Behavior: Normal   Attitude:   Cooperative  Friendly Pleasant  Orientation:   All  Speech   Rate / Production: Normal    Volume:  Normal   Mood:    Normal, happy, laughing  Affect:    Appropriate   Thought Content:  Clear   Thought Form:  Coherent  Logical   Insight:    Good     Diagnoses:  1. Depression, major, recurrent, mild (H)        Collateral Reports Completed:  Communicated with:   Communicated with MATEO JeongBC   Heather [facility]    Plan: (Homework, other):  Patient was given information about behavioral services and encouraged to schedule a follow up appointment with the clinic [facility]  same time as appt with Elsa.  He was also given information about mental health symptoms and treatment options . Recommend to try a Friend's up or a fun bottle to see if he will drink from that. CD Recommendations: No indications of CD issues.     Alena Abbott, ERMELINDA, [facility]   06/02/2022      ______________________________________________________________________    Integrated Primary Care Behavioral Health Treatment Plan    Patient's Name: Scot Bishop  YOB: 1980    Date of Creation: 3/18/2022  Date Treatment Plan Last Reviewed/Revised: 03/18/2022    DSM5 Diagnoses: 296.31 (F33.0) Major Depressive Disorder, Recurrent Episode, Mild _  Psychosocial / Contextual Factors: non-verbal, living in care center, doing physical therapy, low blood pressure  PROMIS (reviewed every 90 days):   PROMIS 10 Global Mental Health Score Global Physical Health Score   2/16/2022 5 10      PROMIS 10 PROMIS TOTAL - SUBSCORES   2/16/2022 15   Will be updated next meeting.          Referral / Collaboration:  Referral to another professional/service is not indicated at this time.    Anticipated number of session for this episode of care: 6-10  Anticipation frequency of session: As determined by Elsa Rahman  Anticipated Duration of each session: 16-37 minutes  Treatment plan will be reviewed in 90 days or when goals have been changed.         MeasurableTreatment Goal(s) related to diagnosis / functional impairment(s)  Goal 1: Patient will reduce combative physical behaviors.    I will know I've met my goal when I am not longer kicking and fighting during interactions.       Objective #A (Patient Action)                          Patient will not act in physical aggression towards care staff during inteactions of dressing, transporting and other daily activities.  Status: New - Date: 03/18/2022      Intervention(s)  Therapist will provide support to care staff and Scot's parents each meeting. Will assist them to  identify behavioral patterns and ways to reduce unwanted behavior through CBT and medication changes provided by psychiatrist         Parent / Guardian has reviewed and agreed to the above plan.        Alena Abbott, [facility]                    March 18, 2022

## 2022-06-02 NOTE — Clinical Note
Good afternoon, Scot seems to be stable on his psychiatric medications.  Is there a chance to meet with his care team providers to discuss long-term options for him regarding the psychiatry role for Scot?  Thank you.  Elsa

## 2022-06-24 ENCOUNTER — PATIENT OUTREACH (OUTPATIENT)
Dept: NURSING | Facility: CLINIC | Age: 42
End: 2022-06-24

## 2022-06-24 NOTE — LETTER
[address] [address]    [facility]  Patient Centered Plan of Care  About Me:        Patient Name:  Scot Bishop    YOB: 1980  Age:         41 year old   Heather MRN:    [id number] Telephone Information:  Home Phone [phone]   Mobile [phone]       Address:  [address] [facility] [address] Email address:  [email or web address]      Emergency Contact(s)    Name Relationship Lgl Grd Work Phone Home Phone Mobile Phone   1. AMOS BISHOP* Father Yes  [phone]   2. SUZI BISHOP* Mother No  [phone]   3. [facility]*   [phone]    4. WALKER RAMAN *     [phone]   5. MITZYLOS Stepparent No  [phone]           Primary language:  English     needed? No   [employer]:  [phone] op. 1  Other communication barriers:Physical impairment    Preferred Method of Communication:     Current living arrangement: Other ([facility])    Mobility Status/ Medical Equipment: Dependent/Assisted by Another    Health Maintenance  Health Maintenance Reviewed: Due/Overdue   Health Maintenance Due   Topic Date Due     Pneumococcal Vaccine: Pediatrics (0 to 5 Years) and At-Risk Patients (6 to 64 Years) (2 - PCV) 10/13/2021     PHQ-9  05/16/2022     My Access Plan  Medical Emergency 911   Primary Clinic Line   - [phone]   24 Hour Appointment Line [phone] or  [phone]) (toll-free)   24 Hour Nurse Line [phone] (toll-free)   Preferred Urgent Care [facility], [phone]     Preferred Hospital [facility]  [phone]     Preferred Pharmacy [facility] - [place], [place] - [address]     Behavioral Health Crisis Line The National Suicide Prevention Lifeline at 1-890.192.3254 or 911     My Care Team Members  Patient Care Team       Relationship Specialty Notifications  Start End    Cachorro Morales MD PCP - General Internal Medicine  1/19/21     Phone: [phone] Fax: [phone]         [address] [place] [address]    Scot Matamoros MD MD Cardiology  6/12/20     Phone: [phone] Fax: [phone]         [address] [facility] [address]    Cachorro Morales MD Assigned PCP   12/24/20     Phone: [phone] Fax: [phone]         [address] [place] [address]    EVELINA Myles CM    2/5/21     [facility]    Phone: [phone]         [facility]     2/5/21     [facility] group home    Phone: [phone] Fax: [phone]        Melanie financial worker Financial Resource Worker   2/5/21     [facility]    Phone: [phone]         Elsa Rahman NP Assigned Behavioral Health Provider   2/14/21     Phone: [phone] Fax: [phone]         [address] [place] [address]    Bibi Marcus MD Assigned Heart and Vascular Provider   3/17/21     Phone: [phone] Pager: [phone] Fax: [phone]         [facility] [address]    Candace Perez RN Lead Care Coordinator  Admissions 8/19/21             My Care Plans  Self Management and Treatment Plan  Goals and (Comments)   Goals        General     Functional (pt-stated)      Notes - Note edited  6/24/2022 11:59 AM by Candace Perez RN     Goal Statement: Patient will have a plan for future rehabilitation services after home care Physical Therapy ends which would include outpatient Physical Therapy  Date Goal set: 8/20/2021  Barriers: Deconditioned   Strengths:Supportive parents   Date to Achieve By: 12/20/2021 // extended to 6/20/2022 //extended to 12/20/2022  Patient/caregiver expressed understanding of goal: Yes  Action steps to achieve this goal:  1. I/caregiver will keep future appointments  2. I/caregiver will discuss, review, schedule and complete recommended overdue health  maintenance with my primary care provider  3. I/caregiver will contact my care team with questions, concerns, support needs   4. I/caregiver will use the clinic as a resource, and I understand I can contact my clinic with 24/7 after hours services available   5. Care Coordinator will remain available as needed    Annual Assessment Due: 8/2022                 Action Plans on File:     Advance Care Plans/Directives Type:   Advanced Directive - On File; DNR/DNI      My Medical and Care Information  Problem List   Patient Active Problem List   Diagnosis     Cardiac arrest (H)     Traumatic brain injury (H)     Thrush, oral     Nicotine dependence     Gastrojejunostomy tube status (H)     G tube feedings (H)     Advance care planning     Dysphagia     Cognitive disorder     Back injury, sequela     Agitation     Aggression     Acute respiratory failure with hypoxia (H)     Thrombocytopenia, unspecified (H)     CARDIOVASCULAR SCREENING; LDL GOAL LESS THAN 100     Hypotension, unspecified hypotension type      Current Medications and Allergies:  No Known Allergies   Current Outpatient Medications   Medication     acetaminophen (TYLENOL) 325 MG tablet     aspirin (ASPIRIN LOW DOSE) 81 MG chewable tablet     atorvastatin (LIPITOR) 40 MG tablet     bacitracin 500 UNIT/GM external ointment     clindamycin (CLEOCIN) 300 MG capsule     docusate sodium (COLACE) 100 MG capsule     Emollient (AQUAPHOR ADVANCED THERAPY) OINT     hydrOXYzine (ATARAX) 10 MG tablet     LORazepam (ATIVAN) 0.5 MG tablet     midodrine (PROAMATINE) 2.5 MG tablet     Neomycin-Bacitracin-Polymyxin (TRIPLE ANTIBIOTIC) 3.5-400-5000 OINT ointment     nystatin (MYCOSTATIN) 959368 UNIT/GM external cream     PARoxetine (PAXIL) 30 MG tablet     sodium chloride 1 GM tablet     ticagrelor (BRILINTA) 90 MG tablet     Valproate Sodium (VALPROIC ACID) 250 MG/5ML     No current facility-administered medications for this visit.       Candace Perez RN    Care  Coordination Start Date: 8/20/2021   Frequency of Care Coordination: monthly     Form Last Updated: 06/24/2022

## 2022-06-24 NOTE — PROGRESS NOTES
Clinic Care Coordination Contact    Follow Up Progress Note      Assessment:   Gerald, father, states patient is doing much better; however he still will not drink from a water bottle or glass and uses a syringe for liquids by mouth.  Patient has a feeding tube that can be removed once the patient takes 8-10 glasses of liquid by mouth.    Gerald states they bought a handicapped Assessable Van to bring patient on vacations with family.  They are now looking into purchasing an EZ Stand for patient to stand periodically throughout the day.  Patient is adjusted to living at the [facility] per Gerald.    Patient is engaged in his outpatient physical therapy and has been standing with Physical Therapy and EZ Stand.    Gerald is requesting an application for disability parking be completed and to call Gerald to  application to send fee with application; RNCC will reach out to PCP team and apprise.    Care Gaps:    Health Maintenance Due   Topic Date Due     Pneumococcal Vaccine: Pediatrics (0 to 5 Years) and At-Risk Patients (6 to 64 Years) (2 - PCV) 10/13/2021     PHQ-9  05/16/2022       Care Gaps Last addressed on 6/24/2022    Goals addressed this encounter:    Goals Addressed                    This Visit's Progress       Functional (pt-stated)   90%      Goal Statement: Patient will have a plan for future rehabilitation services after home care Physical Therapy ends which would include outpatient Physical Therapy  Date Goal set: 8/20/2021  Barriers: Deconditioned   Strengths:Supportive parents   Date to Achieve By: 12/20/2021 // extended to 6/20/2022 //extended to 12/20/2022  Patient/caregiver expressed understanding of goal: Yes  Action steps to achieve this goal:  1. I/caregiver will keep future appointments  2. I/caregiver will discuss, review, schedule and complete recommended overdue health maintenance with my primary care provider  3. I/caregiver will contact my care team with questions, concerns,  support needs   4. I/caregiver will use the clinic as a resource, and I understand I can contact my clinic with 24/7 after hours services available   5. Care Coordinator will remain available as needed    Annual Assessment Due: 8/2022              Intervention/Education provided during outreach:   RNCC called and spoke with patient; introduced self, discussed role of Care Coordination and explained reason for call    Plan:   -Patient will contact the care team with questions, concerns, support needs   -Patient will use the clinic as a resource and understands (s)he can contact the [facility] with 24/7 after hours services available  -Care Coordinator will remain available as needed  -RNCC will follow up in one month for follow up appointments/recommendations and goal progression     Candace Perez RN, BSN, PHN  Primary Care / Care Coordinator   [facility]  E-mail [email or web address]   [phone]

## 2022-06-28 ENCOUNTER — TELEPHONE (OUTPATIENT)
Dept: INTERNAL MEDICINE | Facility: CLINIC | Age: 42
End: 2022-06-28

## 2022-06-28 NOTE — TELEPHONE ENCOUNTER
Patients family questioning if patient is eligible for his 2nd covid booster vaccine. Please advise

## 2022-06-30 NOTE — TELEPHONE ENCOUNTER
Called and relayed message from the provider to the patient's Father, Gerald. Gerald expressed understanding and will plan to bring the paperwork into the clinic for Dr. Morales to complete.     Elizabeth Marie RN

## 2022-06-30 NOTE — TELEPHONE ENCOUNTER
Called and relayed message to the patient's Father, Gerald. Gerald expressed understanding.     Gerald is wondering about the possibility of getting a handicap sticker for his van when he is transporting the patient. Would this be a possibility for the patient?    Routing to PCP for review.     Elizabeth Marie RN

## 2022-07-13 DIAGNOSIS — L98.9 SKIN DISORDER: ICD-10-CM

## 2022-07-13 DIAGNOSIS — G93.1 ANOXIC BRAIN DAMAGE (H): ICD-10-CM

## 2022-07-15 DIAGNOSIS — I46.9 CARDIAC ARREST (H): ICD-10-CM

## 2022-07-15 DIAGNOSIS — K59.00 CONSTIPATION, UNSPECIFIED CONSTIPATION TYPE: ICD-10-CM

## 2022-07-15 RX ORDER — ACETAMINOPHEN 325 MG/1
325 TABLET ORAL EVERY 6 HOURS PRN
Qty: 90 TABLET | Refills: 0 | Status: SHIPPED | OUTPATIENT
Start: 2022-07-15 | End: 2023-11-24

## 2022-07-15 RX ORDER — BACITRACIN ZINC, NEOMYCIN SULFATE, AND POLYMYXIN B SULFATE 400; 3.5; 5 [IU]/G; MG/G; [IU]/G
OINTMENT TOPICAL
Qty: 28.4 G | Refills: 1 | Status: SHIPPED | OUTPATIENT
Start: 2022-07-15 | End: 2023-05-09

## 2022-07-15 RX ORDER — NYSTATIN 100000 U/G
CREAM TOPICAL
Qty: 15 G | Refills: 1 | Status: SHIPPED | OUTPATIENT
Start: 2022-07-15 | End: 2023-11-22

## 2022-07-15 RX ORDER — DOCUSATE SODIUM 100 MG/1
100 CAPSULE, LIQUID FILLED ORAL 2 TIMES DAILY PRN
Qty: 180 CAPSULE | Refills: 0 | Status: SHIPPED | OUTPATIENT
Start: 2022-07-15 | End: 2023-11-24

## 2022-07-15 RX ORDER — MINERAL OIL/HYDROPHIL PETROLAT
OINTMENT (GRAM) TOPICAL
Qty: 396 G | Refills: 0 | Status: SHIPPED | OUTPATIENT
Start: 2022-07-15 | End: 2023-05-09

## 2022-07-15 NOTE — TELEPHONE ENCOUNTER
Routing refill request to provider for review/approval because:  Drug not on the [facility] refill protocol     Nimo HARRINGTON RN  EP Triage

## 2022-07-15 NOTE — TELEPHONE ENCOUNTER
Prescription approved per [facility] Refill Protocol.    Nimo HARRINGTON RN  EP Triage

## 2022-07-20 ENCOUNTER — NURSE TRIAGE (OUTPATIENT)
Dept: NURSING | Facility: CLINIC | Age: 42
End: 2022-07-20

## 2022-07-20 ENCOUNTER — TELEPHONE (OUTPATIENT)
Dept: WOUND CARE | Facility: HOSPITAL | Age: 42
End: 2022-07-20

## 2022-07-20 NOTE — TELEPHONE ENCOUNTER
Provider Response to 2nd Level Triage Request    I have reviewed the RN documentation. My recommendation is:  Limited options at this point if ongoing issues with G-tube thus suggest may benefit from ER evaluation if blocked or problematic for potential interventional radiology assessment

## 2022-07-20 NOTE — TELEPHONE ENCOUNTER
Nurse Triage SBAR    Is this a 2nd Level Triage? YES, LICENSED PRACTITIONER REVIEW IS REQUIRED    Situation: G-tube problem, leaking around the tube when flushed.     Background: patient is using G-tube for water. Patient is also taking in food and water orally but water per G-tube to keep him hydrated.     Assessment: assistance with trouble shooting g-tube or replacing    Protocol Recommended Disposition:   Immediate office evaluation    Recommendation: assistance with addressing G tube leaking, Can ADS assist with this or orders for IR?     Routed to provider    Does the patient meet one of the following criteria for ADS visit consideration? 16+ years old, with an [facility] PCP     TIP  Providers, please consider if this condition is appropriate for management at one of our Acute and Diagnostic Services sites.     If patient is a good candidate, please use dotphrase <dot>triageresponse and select Refer to ADS to document.        Moriah, group home staff, calling with concerns of G-tube that is leaking. Patient is taking food and water orally but the G-tube is still used for water to make sure patient is staying hydrated. The g-tube has been leaking and the skin is getting irritated. Caller is stating that when they flush tube with water it will leak out around the tube. Caller stating that this has been happening for over a week and they have been trying to get it addressed.   Denies abdominal pain or pain with flushing the tube, bleeding.   Protocol recommends immediate office evaluation.   Routing message to PCP and pool to assist  Call back to staff at [phone] if no answer then call  Gerald at number listed in contacts.   Dilma Rosales RN   07/20/22 11:39 AM  [facility] Nurse Advisor    Reason for Disposition    [1] J-tube or GJ-tube is broken or cracked AND [2] is not usable    Additional Information    Negative: Shock suspected (e.g., cold/pale/clammy skin, too weak to stand, low BP, rapid  "pulse)    Negative: [1] Shortness of breath AND [2] new onset    Negative: Bluish (or gray) lips or face now    Negative: Sounds like a life-threatening emergency to the triager    Negative: SEVERE abdominal pain    Negative: [1] Vomiting AND [2] contains red blood or black (\"coffee ground\") material  (Exception: few red streaks in vomit that only happened once)    Negative: [1] Vomiting AND [2] contains bile (green color)    Negative: Tube contains red blood or black (\"coffee ground\") material  (Exception: pink tinge of tube fluid occurs once and clears immediately with irrigation.)    Negative: G-tube, J-tube, or GJ-tube came completely out of site in abdominal wall    Negative: Difficulty breathing    Negative: Dehydration suspected (e.g., no urine > 12 hours, very dry mouth, lightheaded)    Negative: Patient sounds very sick or weak to the triager    Negative: Vomiting > 4 times in the past 4 hours    Negative: Diarrhea > 4 times in the past 4 hours    Negative: [1] G-tube is clogged AND [2] irrigation has been attempted without success    Negative: [1] G-tube is broken or cracked AND [2] is not usable    Negative: [1] G-tube, J-tube, or GJ-tube tube SITE looks infected  (spreading redness) AND [2] fever    Negative: [1] J-tube or GJ-tube is clogged AND [2] irrigation has been attempted without success    Protocols used: FEEDING TUBE SYMPTOMS AND [id number]      "

## 2022-07-20 NOTE — TELEPHONE ENCOUNTER
"Patient Contact    Attempt # 1    Was call answered?  Yes.  \"May I please speak with <patient name>\"  Is patient available?   Yes  Spoke with pt's guardian, Gerald, and relayed provider message. Gerald verbalizes understanding.        "

## 2022-07-22 ENCOUNTER — HOSPITAL ENCOUNTER (EMERGENCY)
Facility: HOSPITAL | Age: 42
Discharge: HOME | End: 2022-07-22
Attending: EMERGENCY MEDICINE | Admitting: EMERGENCY MEDICINE
Payer: COMMERCIAL

## 2022-07-22 VITALS
SYSTOLIC BLOOD PRESSURE: 91 MMHG | HEART RATE: 82 BPM | OXYGEN SATURATION: 99 % | RESPIRATION RATE: 16 BRPM | DIASTOLIC BLOOD PRESSURE: 55 MMHG | TEMPERATURE: 97.9 F | BODY MASS INDEX: 26.66 KG/M2 | HEIGHT: 69 IN | WEIGHT: 180 LBS

## 2022-07-22 DIAGNOSIS — K94.23 MALFUNCTION OF GASTROSTOMY TUBE (H): ICD-10-CM

## 2022-07-22 PROBLEM — I67.89 ISCHEMIC ENCEPHALOPATHY: Status: ACTIVE | Noted: 2021-09-29

## 2022-07-22 PROBLEM — S06.2X9D: Status: ACTIVE | Noted: 2020-07-31

## 2022-07-22 PROBLEM — R13.10 DYSPHAGIA, UNSPECIFIED: Status: ACTIVE | Noted: 2020-07-31

## 2022-07-22 PROBLEM — I21.4 NON-ST ELEVATION (NSTEMI) MYOCARDIAL INFARCTION (H): Status: ACTIVE | Noted: 2020-07-31

## 2022-07-22 PROBLEM — G93.40 ENCEPHALOPATHY, UNSPECIFIED: Status: ACTIVE | Noted: 2020-07-31

## 2022-07-22 PROBLEM — R47.01 APHASIA: Status: ACTIVE | Noted: 2020-08-05

## 2022-07-22 PROBLEM — R46.89 OTHER SYMPTOMS AND SIGNS INVOLVING APPEARANCE AND BEHAVIOR: Status: ACTIVE | Noted: 2020-11-09

## 2022-07-22 PROBLEM — I46.8 CARDIAC ARREST DUE TO OTHER UNDERLYING CONDITION (H): Status: ACTIVE | Noted: 2020-07-31

## 2022-07-22 PROCEDURE — 99283 EMERGENCY DEPT VISIT LOW MDM: CPT | Mod: 25

## 2022-07-22 PROCEDURE — 43999 UNLISTED PROCEDURE STOMACH: CPT

## 2022-07-22 NOTE — DISCHARGE INSTRUCTIONS
We replaced the G-tube today.  Follow-up with your regular doctor to make sure everything is going well.

## 2022-07-22 NOTE — ED PROVIDER NOTES
"[facility] EMERGENCY DEPARTMENT  PHYSICIAN NOTE    MRN: [id number]    FINAL IMPRESSION     1. Malfunction of gastrostomy tube (H)          ED COURSE & MDM       11:48 AM Initial history and physical performed. Plan of care discussed. PPE utilized includes N95 mask, goggles, and gloves.    12:12 PM Patient reevaluated. I changed patient's gastrostomy tube.    Patient presented for G-tube malfunction. Initial vital signs reassuring.  The leakage appeared to be around the tube at the stoma.  On removing the old tube, the balloon only had a few milliliters of water so suspect balloon malfunction is what led to to malfunction.  Replacement G-tube exchanged without difficulty. Patient discharged in stable condition. Return precautions provided. All questions answered.      ===================================================================    HPI     Scot Bishop is a 42 year old male with relevant PMH significant for TBI and aphasia presenting with g-tube leak which was noticed this morning. The patient has had a gastrostomy tube for ~2-3 years and has had this specific tube in for ~6 months. The patient is not in any pain.    ROS  Unable to obtain due to clinical condition of aphasia.    Problem list, medications, allergies, PMH, PSH, family history, and social history reviewed and updated as able in Epic.      PHYSICAL EXAM     Vitals:    07/22/22 1108   BP: 91/55   Pulse: 82   Resp: 16   Temp: 97.9  F (36.6  C)   TempSrc: Oral   SpO2: 99%   Weight: 81.6 kg (180 lb)   Height: 1.753 m (5' 9\")        Constitutional: Alert, no acute distress.  Eyes: Sclera anicteric, EOMI.  Pulm: Non-labored respirations.  Abdomen: Soft, non-tender. LUQ g-tube in place, leaking around tube.  Neuro: A/O x3. Normal speech. No focal deficits.  Skin: Warm and dry, no rash.  Psych: Cooperative, able to follow commands. Intact attention.      PROCEDURE     Gastrostomy Tube Replacement    Performed by: Emmanuel JOHNSON Asp, " MD    Indication: tube malfunction    Consent: risks, benefits, and alternatives discussed with patient's father - consent obtained    Time Out: time out conducted just prior to starting procedure; confirmed patient identity, site/side, procedure, patient position, and availability of correct equipment    Site: left anterior abdominal wall    Preparation: hand cleansing, and replacement tube balloon tested    Type: 18-Fr    Technique: replacement tube lubricated with sterile gel, patient's malfunctioning tube removed, tube passed through stoma without difficulty, balloon inflated with 10ml of sterile water and pulled gently until snug with the ring advanced down to the skin level    Confirmation: withdrawal of gastric fluid through tube    Post-procedure: usual dressing placed    Complications: patient tolerated the procedure well and there were no immediate complications      TESTING   All testing reviewed and interpreted.    EKG  None.    LABS  Labs Ordered and Resulted from Time of ED Arrival to Time of ED Departure - No data to display    IMAGING  No orders to display         I attest that Roxanna Chambers is acting in a scribe capacity, has observed my performance of services, and has documented them in accordance with my direction.       Emmanuel Rich MD  07/22/22 3467

## 2022-07-22 NOTE — ED NOTES
"    ED Provider In Triage Note  [facility]  Encounter Date: Jul 22, 2022    Chief Complaint   Patient presents with     Catheter Problem         Use of Intrepreter: N/A    Brief HPI:   Scto Bishop is a 42 year old male presenting to the Emergency Department with a chief complaint of feeding tube is leaking. Gtube has been in place for 6 months. Seems to be flushing but then leaks.      History provided by father, group home care giver here as well and can answer questions.    Father states that this is his normal BP.    Brief Physical Exam:  BP 91/55   Pulse 82   Temp 97.9  F (36.6  C) (Oral)   Resp 16   Ht 1.753 m (5' 9\")   Wt 81.6 kg (180 lb)   SpO2 99%   BMI 26.58 kg/m    General: Non-toxic appearing  HEENT: Atraumatic  Resp: No respiratory distress  Abdomen: Non-peritoneal, gtube appears to be slightly pulled out from the skin, but is still present in the skin and tract.  Neuro: Alert  Psych: Behavior appropriate      Plan Initiated in Triage:  No orders of the defined types were placed in this encounter.    Possible gtube balloon defective?       PIT Dispo:   Return to lobby while awaiting workup and ED bed availability          Hedy Berumen MD on 7/22/2022 at 11:06 AM        Patient was evaluated by the Physician in Triage due to a limitation of available rooms in the Emergency Department. A plan of care was discussed based on the information obtained on the initial evaluation and patient was counseled to return back to the Emergency Department lobby after this initial evalutaiton until results were obtained or a room became available in the Emergency Department. Patient was counseled not to leave prior to receiving the results of their workup.            Hedy Berumen MD  07/22/22 1111    "

## 2022-07-22 NOTE — ED TRIAGE NOTES
Patient has had a g-tube for about 6 months.  From a group home.  They noticed the tube leaking form the sie today.  Patient not having any pain.      Triage Assessment     Row Name 07/22/22 1110       Triage Assessment (Adult)    Airway WDL WDL       Respiratory WDL    Respiratory WDL WDL       Skin Circulation/Temperature WDL    Skin Circulation/Temperature WDL WDL       Cardiac WDL    Cardiac WDL WDL       Peripheral/Neurovascular WDL    Peripheral Neurovascular WDL WDL       Cognitive/Neuro/Behavioral WDL    Cognitive/Neuro/Behavioral WDL WDL

## 2022-07-25 ENCOUNTER — PATIENT OUTREACH (OUTPATIENT)
Dept: CARE COORDINATION | Facility: CLINIC | Age: 42
End: 2022-07-25

## 2022-07-25 NOTE — PROGRESS NOTES
Clinic Care Coordination Contact    Follow Up Progress Note      Assessment:   Patient was seen at [facility] Emergency Room for a malfunction G Tube and replacement and discharged back to [facility].  RNCC reached out to patients father and left message with contact information for a return call.     Care Gaps:    Health Maintenance Due   Topic Date Due     Pneumococcal Vaccine: Pediatrics (0 to 5 Years) and At-Risk Patients (6 to 64 Years) (2 - PCV) 10/13/2021     PHQ-9  05/16/2022       Scheduled for next RNCC outreach      Goals addressed this encounter:    Goals Addressed                    This Visit's Progress       Functional (pt-stated)   90%      Goal Statement: Patient will have a plan for future rehabilitation services after home care Physical Therapy ends which would include outpatient Physical Therapy  Date Goal set: 8/20/2021  Barriers: Deconditioned   Strengths:Supportive parents   Date to Achieve By: 12/20/2021 // extended to 6/20/2022 //extended to 12/20/2022  Patient/caregiver expressed understanding of goal: Yes  Action steps to achieve this goal:  1. I/caregiver will keep future appointments  2. I/caregiver will discuss, review, schedule and complete recommended overdue health maintenance with my primary care provider  3. I/caregiver will contact my care team with questions, concerns, support needs   4. I/caregiver will use the clinic as a resource, and I understand I can contact my clinic with 24/7 after hours services available   5. Care Coordinator will remain available as needed    Annual Assessment Due: 8/2022              Intervention/Education provided during outreach:   RNCC called and spoke with patient; introduced self, discussed role of Care Coordination and explained reason for call    Plan:   -Patient will contact the care team with questions, concerns, support needs   -Patient will use the clinic as a resource and understands (s)he can contact the  [facility] with 24/7 after hours services available  -Care Coordinator will remain available as needed  -RNCC will follow up in one month for follow up appointments/recommendations and goal progression

## 2022-07-25 NOTE — LETTER
[address] [facility] [address]    [facility]  Patient Centered Plan of Care  About Me:        Patient Name:  Scot Bishop    YOB: 1980  Age:         42 year old   Heather MRN:    [id number] Telephone Information:  Home Phone [phone]   Mobile [phone]       Address:  [address] [address] Email address:  [email or web address]      Emergency Contact(s)    Name Relationship Lgl Grd Work Phone Home Phone Mobile Phone   1. NIGEL BISHOP* Father Yes  [phone]   2. MITZYSUZI* Mother No  [phone]   3. [facility]*   [phone]    4. WALKER RAMAN D*     [phone]   5. MITZYLOS Stepparent No  [phone]           Primary language:  English     needed? No   Heather Language Services:  [phone] op. 1  Other communication barriers:Physical impairment    Preferred Method of Communication:     Current living arrangement: Other ([facility])    Mobility Status/ Medical Equipment: Dependent/Assisted by Another    Health Maintenance  Health Maintenance Reviewed: Due/Overdue   Health Maintenance Due   Topic Date Due     Pneumococcal Vaccine: Pediatrics (0 to 5 Years) and At-Risk Patients (6 to 64 Years) (2 - PCV) 10/13/2021     PHQ-9  05/16/2022     My Access Plan  Medical Emergency 911   Primary Clinic Line   - [phone]   24 Hour Appointment Line [phone] or  [phone]) (toll-free)   24 Hour Nurse Line [phone] (toll-free)   Preferred Urgent Care [facility], [phone]     Preferred Hospital [facility]  [phone]     Preferred Pharmacy [facility] - [place], [place] - [address]     Behavioral Health Crisis Line The National Suicide Prevention Lifeline at 1-697.131.8736 or 988     My Care Team Members  Patient Care Team       Relationship Specialty Notifications Start  End    Cachorro Morales MD PCP - General Internal Medicine  1/19/21     STILL CURRENT AS OF 07/22/2022    Phone: [phone] Fax: [phone]         [address] [place] [address]    Scot Matamoros MD MD Cardiology  6/12/20     Phone: [phone] Fax: [phone]         [address] [facility] [address]    Cachorro Morales MD Assigned PCP   12/24/20     Phone: [phone] Fax: [phone]         [address] [place] [address]    EVELINA Myles CM    2/5/21     [facility]    Phone: [phone]         [facility] group home     2/5/21     [facility] group home    Phone: [phone] Fax: [phone]        Melanie financial worker Financial Resource Worker   2/5/21     [facility]    Phone: [phone]         Elsa Rahman NP Assigned Behavioral Health Provider   2/14/21     Phone: [phone] Fax: [phone]         [address] [place] [address]    Bibi Marcus MD Assigned Heart and Vascular Provider   3/17/21     Phone: [phone] Pager: [phone] Fax: [phone]        [address] [facility] [address]    Candace Perez RN Lead Care Coordinator  Admissions 8/19/21             My Care Plans  Self Management and Treatment Plan  Goals and (Comments)   Goals        General     Functional (pt-stated)      Notes - Note edited  6/24/2022 11:59 AM by Candace Perez RN     Goal Statement: Patient will have a plan for future rehabilitation services after home care Physical Therapy ends which would include outpatient Physical Therapy  Date Goal set: 8/20/2021  Barriers: Deconditioned   Strengths:Supportive parents   Date to Achieve By: 12/20/2021 // extended to 6/20/2022 //extended to 12/20/2022  Patient/caregiver expressed understanding of goal: Yes  Action steps to achieve this goal:  1. I/caregiver will keep future appointments  2. I/caregiver will discuss, review, schedule and complete  recommended overdue health maintenance with my primary care provider  3. I/caregiver will contact my care team with questions, concerns, support needs   4. I/caregiver will use the clinic as a resource, and I understand I can contact my clinic with 24/7 after hours services available   5. Care Coordinator will remain available as needed    Annual Assessment Due: 8/2022                 Action Plans on File:     Advance Care Plans/Directives Type:   Advanced Directive - On File; DNR/DNI      My Medical and Care Information  Problem List   Patient Active Problem List   Diagnosis     Cardiac arrest (H)     Traumatic brain injury (H)     Thrush, oral     Nicotine dependence     Gastrojejunostomy tube status (H)     G tube feedings (H)     Advance care planning     Dysphagia     Cognitive disorder     Back injury, sequela     Agitation     Aggression     Acute respiratory failure with hypoxia (H)     Thrombocytopenia, unspecified (H)     CARDIOVASCULAR SCREENING; LDL GOAL LESS THAN 100     Hypotension, unspecified hypotension type     Aphasia     Non-ST elevation (NSTEMI) myocardial infarction (H)     Other symptoms and signs involving appearance and behavior     Cardiac arrest due to other underlying condition (H)     Encephalopathy, unspecified     Dysphagia, unspecified     Diffuse traumatic brain injury with loss of consciousness of unspecified duration, subsequent encounter     Hypoxic ischemic encephalopathy (HIE), unspecified     Ischemic encephalopathy      Current Medications and Allergies:  No Known Allergies  Current Outpatient Medications   Medication     acetaminophen (TYLENOL) 325 MG tablet     docusate sodium (COLACE) 100 MG capsule     aspirin (ASPIRIN LOW DOSE) 81 MG chewable tablet     atorvastatin (LIPITOR) 40 MG tablet     bacitracin 500 UNIT/GM external ointment     clindamycin (CLEOCIN) 300 MG capsule     hydrOXYzine (ATARAX) 10 MG tablet     LORazepam (ATIVAN) 0.5 MG tablet     midodrine  (PROAMATINE) 2.5 MG tablet     mineral oil-hydrophilic petrolatum (AQUAPHOR) external ointment     Neomycin-Bacitracin-Polymyxin (SM TRIPLE ANTIBIOTIC ORIGINAL) 3.5-400-5000 OINT     nystatin (MYCOSTATIN) 867849 UNIT/GM external cream     PARoxetine (PAXIL) 30 MG tablet     sodium chloride 1 GM tablet     ticagrelor (BRILINTA) 90 MG tablet     Valproate Sodium (VALPROIC ACID) 250 MG/5ML     No current facility-administered medications for this visit.     Care Coordination Start Date: 8/20/2021   Frequency of Care Coordination: monthly     Form Last Updated: 07/25/2022

## 2022-08-02 NOTE — PATIENT INSTRUCTIONS
1.  Lorazepam 0.5 mg twice daily  2.  Hydroxyzine 10 mg up to 3 times daily as needed before appointments or transitions  3.  Valproic acid 125 mg in the morning and 250 mg at bedtime  4.  Paroxetine 30 mg daily    Continue all other medications as reviewed per electronic medical record today.   Safety plan reviewed. To the Emergency Department as needed or call after hours crisis line at [phone] or [phone]. [place] Crisis Text Line. Text [place] to 531810 or Suicide LifeLine Chat: suicideCarta Worldwide.org/chat/  To schedule individual or family therapy, call [facility] at [phone]  Schedule an appointment with me in 2 months or sooner as needed. Call [facility] at [phone] to schedule.  Follow up with primary care provider as planned or for acute medical concerns.  Call the psychiatric nurse line with medication questions or concerns at [phone]  MyChart may be used to communicate with your provider, but this is not intended to be used for emergencies.    Crisis Resources:    National Suicide Prevention Lifeline: 116.691.3544 (TTY: 708.473.8665). Call anytime for help.  (www.suicidepreventionlifeline.org)  National [place] on Mental Illness (www.melvin.org): 756.147.7405 or 107-046-0726.   Mental Health Association (www.mentalhealth.org): 743.398.8770 or 977-674-0010.  [place] Crisis Text Line: Text [place] to 050464  Suicide LifeLine Chat: suicideCarta Worldwide.org/chat

## 2022-08-18 DIAGNOSIS — F34.81 DISRUPTIVE MOOD DYSREGULATION DISORDER (H): ICD-10-CM

## 2022-08-18 RX ORDER — PAROXETINE 30 MG/1
30 TABLET, FILM COATED ORAL EVERY MORNING
Qty: 30 TABLET | Refills: 1 | Status: SHIPPED | OUTPATIENT
Start: 2022-08-18 | End: 2022-09-15

## 2022-08-18 RX ORDER — LORAZEPAM 0.5 MG/1
0.25 TABLET ORAL
Qty: 30 TABLET | Refills: 1 | Status: SHIPPED | OUTPATIENT
Start: 2022-08-18 | End: 2022-09-15

## 2022-08-18 NOTE — TELEPHONE ENCOUNTER
LORazepam (ATIVAN) 0.5 MG tablet  Last Written Prescription Date:   5/31/2022  Last Fill Quantity: 30,   # refills: 1  Last Office Visit :  2/8/2022  Future Office visit:  None  Routing refill request to provider for review/approval because:  Drug not on the [facility], [facility] or [facility] refill protocol or controlled substance    PARoxetine (PAXIL) 30 MG tablet  Last Written Prescription Date:   6/2/2022  Last Fill Quantity: 30,   # refills: 1  Last Office Visit :  4/22/2022  Future Office visit:  None  Routing refill request to provider for review/approval because:  Drug not on the [facility], [facility] or [facility] refill protocol or controlled substance      Ignacia Harris RN  Central Triage Red Flags/Med Refills

## 2022-09-15 ENCOUNTER — VIRTUAL VISIT (OUTPATIENT)
Dept: PSYCHIATRY | Facility: CLINIC | Age: 42
End: 2022-09-15
Payer: COMMERCIAL

## 2022-09-15 ENCOUNTER — VIRTUAL VISIT (OUTPATIENT)
Dept: BEHAVIORAL HEALTH | Facility: CLINIC | Age: 42
End: 2022-09-15
Payer: COMMERCIAL

## 2022-09-15 DIAGNOSIS — F43.22 ADJUSTMENT DISORDER WITH ANXIOUS MOOD: Primary | ICD-10-CM

## 2022-09-15 DIAGNOSIS — F51.04 PSYCHOPHYSIOLOGICAL INSOMNIA: ICD-10-CM

## 2022-09-15 DIAGNOSIS — F34.81 DISRUPTIVE MOOD DYSREGULATION DISORDER (H): ICD-10-CM

## 2022-09-15 DIAGNOSIS — F33.0 DEPRESSION, MAJOR, RECURRENT, MILD (H): Primary | ICD-10-CM

## 2022-09-15 PROCEDURE — 99207 PR NO BILLABLE SERVICE THIS VISIT: CPT | Performed by: COUNSELOR

## 2022-09-15 PROCEDURE — 99214 OFFICE O/P EST MOD 30 MIN: CPT | Mod: 95 | Performed by: NURSE PRACTITIONER

## 2022-09-15 RX ORDER — LORAZEPAM 0.5 MG/1
0.25 TABLET ORAL
Qty: 30 TABLET | Refills: 3 | Status: SHIPPED | OUTPATIENT
Start: 2022-09-15 | End: 2023-01-13

## 2022-09-15 RX ORDER — PAROXETINE 30 MG/1
30 TABLET, FILM COATED ORAL EVERY MORNING
Qty: 30 TABLET | Refills: 3 | Status: SHIPPED | OUTPATIENT
Start: 2022-09-15 | End: 2023-01-13

## 2022-09-15 RX ORDER — VALPROIC ACID 250 MG/5ML
SOLUTION ORAL
Qty: 300 ML | Refills: 3 | Status: SHIPPED | OUTPATIENT
Start: 2022-09-15 | End: 2022-11-21

## 2022-09-15 NOTE — PROGRESS NOTES
"Scot is a 42 year old who is being evaluated via a billable video visit.      How would you like to obtain your AVS? MyChart  If the video visit is dropped, the invitation should be resent by: Text to cell phone: [phone]  Will anyone else be joining your video visit? Yes: mom and dad. How would they like to receive their invitation? DIANNE Gutierres VF    Video-Visit Details    Video Start Time: 11:25 AM    Type of service:  Video Visit    Video End Time:11:45 AM    Originating Location (pt. Location): Home    Distant Location (provider location):  [facility] MENTAL [facility] & ADDICTION [facility]     Platform used for Video Visit: [facility]              Outpatient Psychiatric Progress Note    Name: Scot Bishop   : 1980                    Primary Care Provider: Cachorro Morales MD   Therapist: None    PHQ-9 scores:  PHQ-9 SCORE 11/15/2021   PHQ-9 Total Score MyChart 4 (Minimal depression)   PHQ-9 Total Score 4       CLEMENT-7 scores:  CLEMENT-7 SCORE 2022   Total Score 12 (moderate anxiety)   Total Score 12       Patient Identification:    Patient is a 42 year old year old, single  White Not  or  male  who presents for return visit with me.  Patient is currently disabled. Patient attended the session with His parents , who they agreed to have interview with. Patient prefers to be called: \" Scot\".    Current medications include: acetaminophen (TYLENOL) 325 MG tablet, Take 1 tablet (325 mg) by mouth every 6 hours as needed for mild pain or fever  aspirin (ASPIRIN LOW DOSE) 81 MG chewable tablet, TAKE ONE TABLET PER G TUBE EVERY DAY  atorvastatin (LIPITOR) 40 MG tablet, TAKE ONE TABLET PER G TUBE EVERY NIGHT AT BEDTIME  bacitracin 500 UNIT/GM external ointment,   clindamycin (CLEOCIN) 300 MG capsule, Take 1 capsule (300 mg) by mouth 3 times daily (Patient not taking: Reported on 9/15/2022)  docusate sodium (COLACE) 100 MG capsule, Take 1 capsule (100 mg) by mouth 2 times daily " as needed for constipation  hydrOXYzine (ATARAX) 10 MG tablet, Take 1 tablet (10 mg) by mouth daily as needed for anxiety (30-60 minutes before appointments)  LORazepam (ATIVAN) 0.5 MG tablet, Take 0.5 tablets (0.25 mg) by mouth 2 times daily  midodrine (PROAMATINE) 2.5 MG tablet, 1 tablet (2.5 mg) by Oral or Feeding Tube route 3 times daily (with meals)  mineral oil-hydrophilic petrolatum (AQUAPHOR) external ointment, APPLY TO FEET AND LEGS TWICE DAILY AS NEEDED  Neomycin-Bacitracin-Polymyxin (SM TRIPLE ANTIBIOTIC ORIGINAL) 3.5-400-5000 OINT, APPLY TO AFFECTED AREA TWICE A DAY AS NEEDED  nystatin (MYCOSTATIN) 119217 UNIT/GM external cream, APPLY TO BUTTOCKS TWICE DAILY AS NEEDED  PARoxetine (PAXIL) 30 MG tablet, Take 1 tablet (30 mg) by mouth every morning  sodium chloride 1 GM tablet, Take 1 tablet (1 g) by mouth daily as needed (for low blood pressure before PT session)  Valproate Sodium (VALPROIC ACID) 250 MG/5ML, Take 125 mg (2.5 ml) in the morning and 250 mg (5 ml)  In the evening    No current facility-administered medications on file prior to visit.       The [place] Prescription Monitoring Program has been reviewed and there are no concerns about diversionary activity for controlled substances at this time.      I was able to review most recent Primary Care Provider, specialty provider, and therapy visit notes that I have access to.     Today, his parents report that there has not been much change in cognition.  He is healthy physically.  He eats PO and through syringe.  He takes medication orally.  He does not like to be touched.  He has physical therapy.  He is going to get a standing form - . No skin breakdown.  Scot was nonverbal at this visit.  He appeared calm and detached.     has a past medical history of Acute respiratory failure with hypoxia (H), Aggression (11/09/2020), Agitation (09/21/2020), LOWELL (acute kidney injury) (H), Back injury, sequela (10/27/2017), Cardiac arrest (H) (05/17/2020),  Cardiac arrest (H), Cognitive disorder (11/11/2020), Depressive disorder, Dysphagia (11/11/2020), Dysphagia, Gastrojejunostomy tube status (H) (11/11/2020), HTN (hypertension), Hypoxic ischemic encephalopathy, Low blood pressure (09/17/2020), Nonverbal (11/11/2020), NSTEMI (non-ST elevated myocardial infarction) (H), and TBI (traumatic brain injury) (H) (06/25/2020).    Social history updates:    Patient continues to live in an assisted living facility and requires help with all of his daily activity skills.    Substance use updates:    No alcohol use  Tobacco use: No    Vital Signs:   There were no vitals taken for this visit.    Labs:    Most recent laboratory results reviewed and no new labs.     Mental Status Examination:  Appearance: awake, alert and casual dress  Attitude: evasive  Eye Contact:  looking around room  Gait and Station: No dizziness or falls  Psychomotor Behavior:  no evidence of tardive dyskinesia, dystonia, or tics  Oriented to:  time, person, and place  Attention Span and Concentration:  Fair  Speech:   mute  Mood:  anxious  Affect:  intensity is blunted  Associations:  Unable to assess  Thought Process:  [place]  Thought Content:  no evidence of suicidal ideation or homicidal ideation  Recent and Remote Memory:  poor Not formally assessed. No amnesia.  Fund of Knowledge: low-normal  Insight:  limited  Judgment:  limited  Impulse Control:  limited    Suicide Risk Assessment:  Today Scot Bishop reports no thoughts to harm themself or others. In addition, there are notable risk factors for self-harm, including single status, anxiety and comorbid medical condition of Traumatic brain injury. However, risk is mitigated by absence of past attempts. Therefore, based on all available evidence including the factors cited above, Scot Bishop does not appear to be at imminent risk for self-harm, does not meet criteria for a 72-hr hold, and therefore remains appropriate for ongoing outpatient  level of care.  A thorough assessment of risk factors related to suicide and self-harm have been reviewed and are noted above. The patient convincingly denies suicidality on several occasions. Local community safety resources printed and reviewed for patient to use if needed. There was no deceit detected, and the patient presented in a manner that was believable.     DSM5 Diagnosis:  296.99 (F34.8) Disruptive Mood Dysregulation Disorder  780.52 (G47.00) Insomnia Disorder   With other medical comorbidity  Episodic      Medical comorbidities include:   Patient Active Problem List    Diagnosis Date Noted     Hypotension, unspecified hypotension type 01/17/2022     Priority: Medium     CARDIOVASCULAR SCREENING; LDL GOAL LESS THAN 100 11/16/2021     Priority: Medium     Ischemic encephalopathy 09/29/2021     Priority: Medium     Last Assessment & Plan:   Formatting of this note might be different from the original.  Hypoxic brain injury 5/2020.   Largely non-verbal.  Not following commands.   Lip smacking mouth movements.     Plan   - check EEG to evaluate for seizures   - continue therapy evaluations   - prognosis - guarded, history and exam are consistent with severe, permanent brain injury       Nicotine dependence 04/16/2021     Priority: Medium     Formatting of this note might be different from the original.  Created by Conversion       Thrombocytopenia, unspecified (H) 04/16/2021     Priority: Medium     Thrush, oral 12/09/2020     Priority: Medium     Advance care planning 12/04/2020     Priority: Medium     Formatting of this note might be different from the original.  Community Palliative Care was asked to see patient by inpatient palliative care on 11/23 for continuation of palliative care in a new setting.  Date of last visit: 12/9/2020    Formatting of this note is different from the original.  Advance Care Planning   Patient has identified Health Care Agent(s): Yes  Add Health Care Agents: Yes    Health  Care Agent(s):  Guardian: Gerald Bishop Relationship: father Phone: [phone]     Patient has Advance Care Plan Documents (Health Care Directive, POLST): Yes    Advance Care Plan Documents:  POLST Form     Patient has identified Specific Treatment Preferences: Yes     How have preferences been verified: POLST    Specific Treatment Preferences:   a.) Code Status:  DNR/ Do Not Attempt Resuscitation - Allow a Natural Death  b.) Goals of Treatment:   ii. Selective Treatment: Use medical treatment, antibiotics, IV fluids and cardiac monitor as indicated. No intubation, advanced airway interventions, or mechanical ventilation. May consider less invasive airway support (e.g. CPAP, BiPAP). Transfer to hospital if indicated. Generally avoid the intensive care unit. All patients will receive comfort-focused treatments.  TREATMENT PLAN: Provide basic medical treatments aimed at treating new or reversible illness.  c.) Interventions and Treatments:  i.  Artificially Administered Nutrition and Hydration: - Long term artificial nutrion/hydration by tube through (not specified) (specify mouth/nose) and (not specified) (specify if ok with directly into GI tract)  ii.  Antibiotics: - Oral antibiotics only (NO IV/IM)       Acute respiratory failure with hypoxia (H) 12/04/2020     Priority: Medium     Gastrojejunostomy tube status (H) 11/11/2020     Priority: Medium     Cognitive disorder 11/11/2020     Priority: Medium     Aggression 11/09/2020     Priority: Medium     Other symptoms and signs involving appearance and behavior 11/09/2020     Priority: Medium     Agitation 09/21/2020     Priority: Medium     G tube feedings (H) 09/03/2020     Priority: Medium     Aphasia 08/05/2020     Priority: Medium     Dysphagia 08/04/2020     Priority: Medium     Non-ST elevation (NSTEMI) myocardial infarction (H) 07/31/2020     Priority: Medium     Cardiac arrest due to other underlying condition (H) 07/31/2020     Priority: Medium      Encephalopathy, unspecified 07/31/2020     Priority: Medium     Dysphagia, unspecified 07/31/2020     Priority: Medium     Diffuse traumatic brain injury with loss of consciousness of unspecified duration, subsequent encounter 07/31/2020     Priority: Medium     Hypoxic ischemic encephalopathy (HIE), unspecified 07/31/2020     Priority: Medium     Traumatic brain injury (H) 06/25/2020     Priority: Medium     Cardiac arrest (H) 05/17/2020     Priority: Medium     Back injury, sequela 10/27/2017     Priority: Medium     Formatting of this note might be different from the original.  WC Case w/ [employer] (www.accidentfund.com)  CLM #[id number];  Lydia Machuca [phone]         Assessment:    Scot Bishop continues to remain stable.  He is nonverbal.  Anger outbursts occur in situations where he is touched excessively.  His parents voiced no concerns today..  His parents desire no changes in his medications at this point.  My thoughts are to potentially decrease the dose of paroxetine in the future so that he is maintained on low doses of medications as possible.    Medication side effects and alternatives were reviewed. Health promotion activities recommended and reviewed today. All questions addressed. Education and counseling completed regarding risks and benefits of medications and psychotherapy options.    Treatment Plan:        1.  Lorazepam 0.5 mg twice daily    2.  Depakote liquid 250 mg in the morning and 500 mg in the evening    3.  Paroxetine 30 mg daily    4.  Hydroxyzine 10 mg daily, 30 to 60 minutes before appointments, as needed for anxiety        Continue all other medications as reviewed per electronic medical record today.     Safety plan reviewed. To the Emergency Department as needed or call after hours crisis line at [phone] or [phone]. [place] Crisis Text Line. Text [place] to 765503 or Suicide LifeLine Chat: suicidepreventionlifeline.org/chat/    To schedule  individual or family therapy, call [facility] at [phone]    Schedule an appointment with me in 2 months or sooner as needed. Call [facility] at [phone] to schedule.    Follow up with primary care provider as planned or for acute medical concerns.    Call the psychiatric nurse line with medication questions or concerns at [phone]    MyChart may be used to communicate with your provider, but this is not intended to be used for emergencies.    Crisis Resources:    National Suicide Prevention Lifeline: 471.828.5822 (TTY: 130.624.4133). Call anytime for help.  (www.suicidepreventionlifeline.org)  National [place] on Mental Illness (www.melvin.org): 488.103.3403 or 903-812-5136.   Mental Health Association (www.mentalhealth.org): 107.102.1354 or 912-845-6552.  [place] Crisis Text Line: Text [place] to 957730  Suicide LifeLine Chat: suicidepreventionlifeline.org/chat    Administrative Billing:   Time spent with patient includes counseling and coordination of care regarding above diagnoses and treatment plan.    Patient Status:  Patient will continue to be seen for ongoing consultation and stabilization.    Signed:   LASHAWN Jeong-BC   Psychiatry

## 2022-09-15 NOTE — PROGRESS NOTES
[facility]    September 15, 2022      Behavioral Health Clinician Progress Note    Patient Name: Scot Bishop           Service Type:  Family with client present      Service Location:   MyChart / Email (patient reached)     Session Start Time: 1101am  Session End Time: 1113am      Session Length: 12 minutes- no bill     Attendees: Patient, Father and Mother     Service Modality:  Video Visit:      Provider verified identity through the following two step process.  Patient provided:  Patient is known previously to provider    Telemedicine Visit: The patient's condition can be safely assessed and treated via synchronous audio and visual telemedicine encounter.      Reason for Telemedicine Visit: Services only offered telehealth    Originating Site (Patient Location): Patient's home    Distant Site (Provider Location): Provider Remote Setting- Home Office    Consent:  The patient/guardian has verbally consented to: the potential risks and benefits of telemedicine (video visit) versus in person care; bill my insurance or make self-payment for services provided; and responsibility for payment of non-covered services.     Patient would like the video invitation sent by:  My Chart    Mode of Communication:  Video Conference via [facility]    As the provider I attest to compliance with applicable laws and regulations related to telemedicine.    Visit Activities (Refresh list every visit): [facility] Only    Diagnostic Assessment Date: 2/16/2022  Treatment Plan Review Date: 6/18/2022- will be updated next meeting   See Flowsheets for today's PHQ-9 and CLEMENT-7 results  Previous PHQ-9:   PHQ-9 SCORE 11/15/2021   PHQ-9 Total Score [facility] 4 (Minimal depression)   PHQ-9 Total Score 4     Previous CLEMENT-7:   CLEMENT-7 SCORE 2/16/2022   Total Score 12 (moderate anxiety)   Total Score 12       HEYDI LEVEL:  No flowsheet data found.    DATA  Extended Session (60+ minutes): No  Interactive  Patient called stating [facility] spoke with  about concerns and that incision site is ok to be warm as long as there isn't any drainage. Patient displayed understanding.         Message sent  for review Complexity: No  Crisis: No  formerly [facility] Patient: No    Treatment Objective(s) Addressed in This Session:  Target Behavior(s): drinking more and from a cup, continue laughing and smiling, less agitation      Depressed Mood: Increase interest, engagement, and pleasure in doing things  Decrease frequency and intensity of feeling down, depressed, hopeless  Anxiety: Anxiety: will experience a reduction in anxiety, will develop more effective coping skills to manage anxiety symptoms    Current Stressors / Issues:   update: Pt's mom says that the doesn't drink out of a bottle still, though he will take the syringe. Pt's mom says that they will get a standing stand for him at home to build muscle. Pt's dad says that he doesn't want staff to help dress him in the morning or clean him up after a BM, that Scot will become agitated. [facility] states that they wonder if this is related to tactile and sensory issues, though his parents say that this has been consistent behavior for a long time. His mom said that he doesn't want to move his leg when he is asked and that staff at the facility may not want him walking since he doesn't obey commands very well.     Appetite: unremarkable  Sleep: unremarkable    Therapist: not at this time, is meeting with a physical therapist   Interventions: supportive therapy   Medication Questions/Requests:nothing really       Progress on Treatment Objective(s) / Homework:  Satisfactory progress - ACTION (Actively working towards change); Intervened by reinforcing change plan / affirming steps taken    Motivational Interviewing    MI Intervention: Co-Developed Goal: reduce combative behavior, Expressed Empathy/Understanding, Supported Autonomy, Collaboration, Evocation, Permission to raise concern or advise, Open-ended questions, Reflections: simple and complex, Change talk (evoked) and Reframe     Change Talk Expressed by the Patient: Need to change Committment to change Activation Taking steps    Provider  Response to Change Talk: E - Evoked more info from patient about behavior change, A - Affirmed patient's thoughts, decisions, or attempts at behavior change, R - Reflected patient's change talk and S - Summarized patient's change talk statements    Also provided psychoeducation about behavioral health condition, symptoms, and treatment options    Care Plan review completed: Yes    Medication Review:  No changes to current psychiatric medication(s)    Medication Compliance:  Yes    Changes in Health Issues:   None reported    Chemical Use Review:   Substance Use: Chemical use reviewed, no active concerns identified      Tobacco Use: No current tobacco use.      Assessment: Current Emotional / Mental Status (status of significant symptoms):  Risk status (Self / Other harm or suicidal ideation)  Patient denies a history of suicidal ideation, suicide attempts, self-injurious behavior, homicidal ideation, homicidal behavior and and other safety concerns  Patient denies current fears or concerns for personal safety.  Patient denies current or recent suicidal ideation or behaviors.  Patient denies current or recent homicidal ideation or behaviors.  Patient denies current or recent self injurious behavior or ideation.  Patient denies other safety concerns.  A safety and risk management plan has not been developed at this time, however patient was encouraged to call [place] [phone] should there be a change in any of these risk factors.    Appearance:   Appropriate   Eye Contact:   Good , pt is looking more at the screen than past visits  Psychomotor Behavior: Normal   Attitude:   Cooperative  Friendly Pleasant  Orientation:   All  Speech   Rate / Production: Normal    Volume:  Normal   Mood:    Normal, smiling, laughing  Affect:    Appropriate   Thought Content:  Clear   Thought Form:  Coherent  Logical   Insight:    Good     Diagnoses:  1. Depression, major, recurrent, mild (H)        Collateral Reports  Completed:  Communicated with:   Communicated with MATEO JeongBC   [facility]    Plan: (Homework, other):  Patient was given information about behavioral services and encouraged to schedule a follow up appointment with the clinic [facility] same time as appt with Elsa.  He was also given information about mental health symptoms and treatment options . Recommend to continue to help pt be outside and to laugh and enjoy activities. [facility] encourages his parents to look into seeing if there are sensory or tactile issues related to dressing and drinking water and talk with physical therapist to see their thoguhts. CD Recommendations: No indications of CD issues.     Alena Abbott, ERMELINDA, [facility]   09/15/2022      ______________________________________________________________________    Integrated Primary Care Behavioral Health Treatment Plan    Patient's Name: Scot Bishop  YOB: 1980    Date of Creation: 3/18/2022  Date Treatment Plan Last Reviewed/Revised: 03/18/2022- will be updated next meeting     DSM5 Diagnoses: 296.31 (F33.0) Major Depressive Disorder, Recurrent Episode, Mild _  Psychosocial / Contextual Factors: non-verbal, living in care center, doing physical therapy, low blood pressure  PROMIS (reviewed every 90 days):   PROMIS 10 Global Mental Health Score Global Physical Health Score   2/16/2022 5 10   7/29/2022 7 11     PROMIS 10 PROMIS TOTAL - SUBSCORES   2/16/2022 15   7/29/2022 18         Referral / Collaboration:  Referral to another professional/service is not indicated at this time.    Anticipated number of session for this episode of care: 6-10  Anticipation frequency of session: As determined by Elsa Rahman  Anticipated Duration of each session: 16-37 minutes  Treatment plan will be reviewed in 90 days or when goals have been changed.         MeasurableTreatment Goal(s) related to diagnosis / functional impairment(s)  Goal 1: Patient will reduce combative physical  behaviors.    I will know I've met my goal when I am not longer kicking and fighting during interactions.       Objective #A (Patient Action)                          Patient will not act in physical aggression towards care staff during inteactions of dressing, transporting and other daily activities.  Status: New - Date: 03/18/2022      Intervention(s)  Therapist will provide support to care staff and Scot's parents each meeting. Will assist them to identify behavioral patterns and ways to reduce unwanted behavior through CBT and medication changes provided by psychiatrist         Parent / Guardian has reviewed and agreed to the above plan.        Alena Abbott, [facility]                    March 18, 2022

## 2022-09-20 ENCOUNTER — PATIENT OUTREACH (OUTPATIENT)
Dept: NURSING | Facility: CLINIC | Age: 42
End: 2022-09-20

## 2022-09-20 NOTE — PROGRESS NOTES
"Clinic Care Coordination Contact    Follow Up Progress Note      Assessment:   Demetrio, patient's father and guardian, states patient is \"doing well\" yet still will not drink fluids without using a syringe.  They have purchased an accessible van and would like to bring the patient to the [place] [facility] [place] [facility].  The trouble is finding a PCA that will travel with family or finding PCA services out of [place], [place].  Demetrio reports patients EVELINA Thomson CM is \"trying to help\" and she is having \"difficulty\" as well.  Demetrio has reached out to PCA agencies in [place] without success.    RNCC suggested another family member or perhaps volunteers from [employer].  Demetrio states the problem is that patient is incontinent; he has changed patients diapers when \"he was a baby\" and has \"no desire\" to change patient as an adult.     Patient continues attending out patient physical therapy and Demetrio hopes he will be receiving an [employer] sit to stand DME.    Care Gaps:    Health Maintenance Due   Topic Date Due     Pneumococcal Vaccine: Pediatrics (0 to 5 Years) and At-Risk Patients (6 to 64 Years) (2 - PCV) 10/13/2021     COVID-19 Vaccine (4 - Booster for Moderna series) 01/11/2022     PHQ-9  05/16/2022     INFLUENZA VACCINE (1) 09/01/2022       Scheduled for next RNCC outreach      Care Plans  Care Plan: Physical Therapy     Problem: Lifestyle choices     Goal: Functional     Start Date: 8/20/2021 Expected End Date: 12/20/2022    This Visit's Progress: 90%    Note:     Goal Statement: Patient will continue Physical Therapy/OT outpatient therapy.  Barriers: Deconditioned   Strengths:Supportive parents   Patient/caregiver expressed understanding of goal: Yes  Action steps to achieve this goal:  1. I/caregiver will keep future appointments  2. I/caregiver will discuss, review, schedule and complete recommended overdue health maintenance with my primary care provider  3. I/caregiver will contact my care team with questions, concerns, support " needs   4. I/caregiver will use the clinic as a resource, and I understand I can contact my clinic with 24/7 after hours services available   5. Care Coordinator will remain available as needed                        Intervention/Education provided during outreach:   RNCC called and spoke with patient; introduced self, discussed role of Care Coordination and explained reason for call    Plan:   -Patient will contact the care team with questions, concerns, support needs   -Patient will use the clinic as a resource and understands (s)he can contact the [facility] with 24/7 after hours services available  -Care Coordinator will remain available as needed  -RNCC will follow up in one month for follow up appointments/recommendations and goal progression     Candace Perez RN, BSN, PHN  Primary Care / Care Coordinator   [facility]  E-mail [email or web address]   [phone]

## 2022-09-24 ENCOUNTER — HEALTH MAINTENANCE LETTER (OUTPATIENT)
Age: 42
End: 2022-09-24

## 2022-10-18 NOTE — PATIENT INSTRUCTIONS
1.  Lorazepam 0.5 mg twice daily  2.  Depakote liquid 250 mg in the morning and 500 mg in the evening  3.  Paroxetine 30 mg daily  4.  Hydroxyzine 10 mg daily, 30 to 60 minutes before appointments, as needed for anxiety    Continue all other medications as reviewed per electronic medical record today.   Safety plan reviewed. To the Emergency Department as needed or call after hours crisis line at [phone] or [phone]. [place] Crisis Text Line. Text [place] to 682918 or Suicide LifeLine Chat: suicideIWT.org/chat/  To schedule individual or family therapy, call [facility] at [phone]  Schedule an appointment with me in 2 months or sooner as needed. Call [facility] at [phone] to schedule.  Follow up with primary care provider as planned or for acute medical concerns.  Call the psychiatric nurse line with medication questions or concerns at [phone]  MyChart may be used to communicate with your provider, but this is not intended to be used for emergencies.    Crisis Resources:    National Suicide Prevention Lifeline: 501.639.6092 (TTY: 751.842.3544). Call anytime for help.  (www.suicidepreventionlifeline.org)  National Stella on Mental Illness (www.melvin.org): 936.720.1761 or 328-513-6627.   Mental Health Association (www.mentalhealth.org): 457.218.5941 or 900-924-7478.  [place] Crisis Text Line: Text [place] to 809135  Suicide LifeLine Chat: suicideIWT.org/chat

## 2022-10-20 ENCOUNTER — PATIENT OUTREACH (OUTPATIENT)
Dept: CARE COORDINATION | Facility: CLINIC | Age: 42
End: 2022-10-20

## 2022-10-20 NOTE — PROGRESS NOTES
Clinic Care Coordination Contact  [facility]/Voicemail    Referral Source: Care Team  Clinical Data: Care Coordinator Outreach  Outreach attempted x 1.  Left message on patient's voicemail with call back information and requested return call.    Plan: Care Coordinator will try to reach patient again in 1 month.     Candace Perez RN, BSN, PHN  Primary Care / Care Coordinator   [facility]  E-mail [email or web address]   [phone]

## 2022-10-20 NOTE — LETTER
[address] [facility] [address]    [facility]  Patient Centered Plan of Care  About Me:        Patient Name:  Scot Bishop    YOB: 1980  Age:         42 year old   Heather MRN:    [id number] Telephone Information:  Home Phone [phone]   Mobile [phone]       Address:  [address] [facility] [facility] [address] [facility] [address] Email address:  [email or web address]      Emergency Contact(s)    Name Relationship Lgl Grd Work Phone Home Phone Mobile Phone   1. NIGEL BISHOP* Father Yes  [phone]   2. MITZYSUZI* Mother No  [phone]   3. [facility]*   [phone]    4. WALKER RAMAN D*     [phone]   5. MITZYLOS Stepparent No  [phone]           Primary language:  English     needed? No   Heather Language Services:  [phone] op. 1  Other communication barriers:Cognitive impairment; Other (Non verbal)    Preferred Method of Communication:  Margie  Current living arrangement: Other ([facility])    Mobility Status/ Medical Equipment: Dependent/Assisted by Another    Health Maintenance  Health Maintenance Reviewed: Due/Overdue   Health Maintenance Due   Topic Date Due     HEPATITIS B IMMUNIZATION (1 of 3 - 3-dose series) Never done     Pneumococcal Vaccine: Pediatrics (0 to 5 Years) and At-Risk Patients (6 to 64 Years) (2 - PCV) 10/13/2021     COVID-19 Vaccine (4 - Booster for Moderna series) 01/11/2022     PHQ-9  05/16/2022     INFLUENZA VACCINE (1) 09/01/2022     YEARLY PREVENTIVE VISIT  11/16/2022     My Access Plan  Medical Emergency 911   Primary Clinic Line   - [phone]   24 Hour Appointment Line [phone] or  [phone]) (toll-free)   24 Hour Nurse Line [phone] (toll-free)   Preferred Urgent Care [facility], [phone]     [facility]  [phone]     [facility] - [place], [place] - [address] [place] [address]     Behavioral Health Crisis Line The National Suicide Prevention Lifeline at 1-628.728.2955 or Text/Call 988     My Care Team Members  Patient Care Team       Relationship Specialty Notifications Start End    Cachorro Morales MD PCP - General Internal Medicine  1/19/21     STILL CURRENT AS OF 07/22/2022    Phone: [phone] Fax: [phone]         [address] [place] [address]    Scot Matamoros MD MD Cardiology  6/12/20     Phone: [phone] Fax: [phone]         [address] [place] [address]    Cachorro Morales MD Assigned PCP   12/24/20     Phone: [phone] Fax: [phone]         [address] [address]    EVELINA Myles CM    2/5/21     [facility]    Phone: [phone]         [facility]     2/5/21     [facility] group [facility]    Phone: [phone] Fax: [phone]        Melanie financial worker Financial Resource Worker   2/5/21     [facility]    Phone: [phone]         Elsa Rahman NP Assigned Behavioral Health Provider   2/14/21     Phone: [phone] Fax: [phone]         [address] [place] [address]    Bibi Marcus MD Assigned Heart and Vascular Provider   3/17/21     Phone: [phone] Pager: [phone] Fax: [phone]        [address] [facility] [address]    Candace Perez RN Lead Care Coordinator  Admissions 8/19/21             My Care Plans  Self Management and Treatment Plan  Care Plan  Care Plan: Physical Therapy     Problem: Lifestyle choices     Goal: Functional     Start Date: 8/20/2021 Expected End Date: 12/20/2022    This Visit's Progress: 90% Recent Progress: 90%    Note:     Goal Statement: Patient will continue Physical Therapy/Outpatient Therapy.  Barriers: Deconditioned   Strengths:Supportive parents   Patient/caregiver expressed understanding of goal: Yes  Action steps to achieve this  goal:  1. I/caregiver will keep future appointments  2. I/caregiver will discuss, review, schedule and complete recommended overdue health maintenance with my primary care provider  3. I/caregiver will contact my care team with questions, concerns, support needs   4. I/caregiver will use the clinic as a resource, and I understand I can contact my clinic with 24/7 after hours services available                          Action Plans on File:     Advance Care Plans/Directives Type:   Advanced Directive - On File; DNR/DNI      My Medical and Care Information  Problem List   Patient Active Problem List   Diagnosis     Cardiac arrest (H)     Traumatic brain injury     Thrush, oral     Nicotine dependence     Gastrojejunostomy tube status (H)     G tube feedings (H)     Advance care planning     Dysphagia     Cognitive disorder     Back injury, sequela     Agitation     Aggression     Acute respiratory failure with hypoxia (H)     Thrombocytopenia, unspecified (H)     CARDIOVASCULAR SCREENING; LDL GOAL LESS THAN 100     Hypotension, unspecified hypotension type     Aphasia     Non-ST elevation (NSTEMI) myocardial infarction (H)     Other symptoms and signs involving appearance and behavior     Cardiac arrest due to other underlying condition (H)     Encephalopathy, unspecified     Dysphagia, unspecified     Diffuse traumatic brain injury with loss of consciousness of unspecified duration, subsequent encounter     Hypoxic ischemic encephalopathy (HIE), unspecified     Ischemic encephalopathy      Current Medications and Allergies:  No Known Allergies  Current Outpatient Medications   Medication     acetaminophen (TYLENOL) 325 MG tablet     aspirin (ASPIRIN LOW DOSE) 81 MG chewable tablet     atorvastatin (LIPITOR) 40 MG tablet     bacitracin 500 UNIT/GM external ointment     docusate sodium (COLACE) 100 MG capsule     hydrOXYzine (ATARAX) 10 MG tablet     LORazepam (ATIVAN) 0.5 MG tablet     midodrine (PROAMATINE) 2.5 MG  tablet     mineral oil-hydrophilic petrolatum (AQUAPHOR) external ointment     Neomycin-Bacitracin-Polymyxin (SM TRIPLE ANTIBIOTIC ORIGINAL) 3.5-400-5000 OINT     nystatin (MYCOSTATIN) 589327 UNIT/GM external cream     PARoxetine (PAXIL) 30 MG tablet     sodium chloride 1 GM tablet     Valproate Sodium (VALPROIC ACID) 250 MG/5ML     No current facility-administered medications for this visit.     Care Coordination Start Date: 8/20/2021   Frequency of Care Coordination: monthly     Form Last Updated: 10/20/2022

## 2022-11-17 ENCOUNTER — OFFICE VISIT (OUTPATIENT)
Dept: INTERNAL MEDICINE | Facility: CLINIC | Age: 42
End: 2022-11-17
Payer: COMMERCIAL

## 2022-11-17 VITALS
BODY MASS INDEX: 21.86 KG/M2 | HEART RATE: 78 BPM | TEMPERATURE: 97.2 F | DIASTOLIC BLOOD PRESSURE: 64 MMHG | RESPIRATION RATE: 14 BRPM | WEIGHT: 148 LBS | OXYGEN SATURATION: 100 % | SYSTOLIC BLOOD PRESSURE: 98 MMHG

## 2022-11-17 DIAGNOSIS — Z00.00 ROUTINE GENERAL MEDICAL EXAMINATION AT A HEALTH CARE FACILITY: Primary | ICD-10-CM

## 2022-11-17 DIAGNOSIS — I21.4 NON-ST ELEVATION (NSTEMI) MYOCARDIAL INFARCTION (H): ICD-10-CM

## 2022-11-17 DIAGNOSIS — L98.9 SKIN LESION: ICD-10-CM

## 2022-11-17 DIAGNOSIS — Z13.6 CARDIOVASCULAR SCREENING; LDL GOAL LESS THAN 100: ICD-10-CM

## 2022-11-17 DIAGNOSIS — S06.9X9S TRAUMATIC BRAIN INJURY WITH LOSS OF CONSCIOUSNESS, SEQUELA (H): ICD-10-CM

## 2022-11-17 LAB
ALBUMIN SERPL BCG-MCNC: 4.5 G/DL (ref 3.5–5.2)
ALP SERPL-CCNC: 82 U/L (ref 40–129)
ALT SERPL W P-5'-P-CCNC: 12 U/L (ref 10–50)
ANION GAP SERPL CALCULATED.3IONS-SCNC: 10 MMOL/L (ref 7–15)
AST SERPL W P-5'-P-CCNC: 17 U/L (ref 10–50)
BILIRUB SERPL-MCNC: 0.4 MG/DL
BUN SERPL-MCNC: 13.7 MG/DL (ref 6–20)
CALCIUM SERPL-MCNC: 9.2 MG/DL (ref 8.6–10)
CHLORIDE SERPL-SCNC: 102 MMOL/L (ref 98–107)
CHOLEST SERPL-MCNC: 110 MG/DL
CREAT SERPL-MCNC: 0.87 MG/DL (ref 0.67–1.17)
DEPRECATED HCO3 PLAS-SCNC: 29 MMOL/L (ref 22–29)
ERYTHROCYTE [DISTWIDTH] IN BLOOD BY AUTOMATED COUNT: 12.4 % (ref 10–15)
GFR SERPL CREATININE-BSD FRML MDRD: >90 ML/MIN/1.73M2
GLUCOSE SERPL-MCNC: 54 MG/DL (ref 70–99)
HCT VFR BLD AUTO: 38.8 % (ref 40–53)
HDLC SERPL-MCNC: 32 MG/DL
HGB BLD-MCNC: 12.6 G/DL (ref 13.3–17.7)
LDLC SERPL CALC-MCNC: 40 MG/DL
MCH RBC QN AUTO: 30.4 PG (ref 26.5–33)
MCHC RBC AUTO-ENTMCNC: 32.5 G/DL (ref 31.5–36.5)
MCV RBC AUTO: 94 FL (ref 78–100)
NONHDLC SERPL-MCNC: 78 MG/DL
PLATELET # BLD AUTO: 187 10E3/UL (ref 150–450)
POTASSIUM SERPL-SCNC: 4.2 MMOL/L (ref 3.4–5.3)
PROT SERPL-MCNC: 7.9 G/DL (ref 6.4–8.3)
RBC # BLD AUTO: 4.15 10E6/UL (ref 4.4–5.9)
SODIUM SERPL-SCNC: 141 MMOL/L (ref 136–145)
TRIGL SERPL-MCNC: 188 MG/DL
WBC # BLD AUTO: 9 10E3/UL (ref 4–11)

## 2022-11-17 PROCEDURE — 80053 COMPREHEN METABOLIC PANEL: CPT | Performed by: INTERNAL MEDICINE

## 2022-11-17 PROCEDURE — 36415 COLL VENOUS BLD VENIPUNCTURE: CPT | Performed by: INTERNAL MEDICINE

## 2022-11-17 PROCEDURE — 80061 LIPID PANEL: CPT | Performed by: INTERNAL MEDICINE

## 2022-11-17 PROCEDURE — 0134A COVID-19,PF,MODERNA BIVALENT: CPT | Performed by: INTERNAL MEDICINE

## 2022-11-17 PROCEDURE — 90686 IIV4 VACC NO PRSV 0.5 ML IM: CPT | Performed by: INTERNAL MEDICINE

## 2022-11-17 PROCEDURE — 91313 COVID-19,PF,MODERNA BIVALENT: CPT | Performed by: INTERNAL MEDICINE

## 2022-11-17 PROCEDURE — 85027 COMPLETE CBC AUTOMATED: CPT | Performed by: INTERNAL MEDICINE

## 2022-11-17 PROCEDURE — 99396 PREV VISIT EST AGE 40-64: CPT | Mod: 25 | Performed by: INTERNAL MEDICINE

## 2022-11-17 PROCEDURE — 90471 IMMUNIZATION ADMIN: CPT | Performed by: INTERNAL MEDICINE

## 2022-11-17 PROCEDURE — 99213 OFFICE O/P EST LOW 20 MIN: CPT | Mod: 25 | Performed by: INTERNAL MEDICINE

## 2022-11-17 RX ORDER — SULFAMETHOXAZOLE/TRIMETHOPRIM 800-160 MG
1 TABLET ORAL 2 TIMES DAILY
Qty: 14 TABLET | Refills: 0 | Status: SHIPPED | OUTPATIENT
Start: 2022-11-17 | End: 2023-05-09

## 2022-11-17 ASSESSMENT — ENCOUNTER SYMPTOMS
NERVOUS/ANXIOUS: 0
PARESTHESIAS: 1
FREQUENCY: 0
HEARTBURN: 0
HEADACHES: 0
CHILLS: 0
HEMATURIA: 0
ABDOMINAL PAIN: 0
PALPITATIONS: 0
DIZZINESS: 0
SORE THROAT: 0
EYE PAIN: 0
FEVER: 0
NAUSEA: 0
MYALGIAS: 0
JOINT SWELLING: 0
DYSURIA: 0
WEAKNESS: 0
COUGH: 0
CONSTIPATION: 0
SHORTNESS OF BREATH: 0
HEMATOCHEZIA: 0
ARTHRALGIAS: 0
DIARRHEA: 0

## 2022-11-17 ASSESSMENT — PATIENT HEALTH QUESTIONNAIRE - PHQ9
10. IF YOU CHECKED OFF ANY PROBLEMS, HOW DIFFICULT HAVE THESE PROBLEMS MADE IT FOR YOU TO DO YOUR WORK, TAKE CARE OF THINGS AT HOME, OR GET ALONG WITH OTHER PEOPLE: NOT DIFFICULT AT ALL
SUM OF ALL RESPONSES TO PHQ QUESTIONS 1-9: 11
SUM OF ALL RESPONSES TO PHQ QUESTIONS 1-9: 11

## 2022-11-17 NOTE — PROGRESS NOTES
SUBJECTIVE:   Scot is a 42 year old who presents for Preventive Visit.  Patient has been advised of split billing requirements and indicates understanding: Yes  Are you in the first 12 months of your Medicare coverage?  No    Healthy Habits:     Getting at least 3 servings of Calcium per day:  Yes    Bi-annual eye exam:  NO    Dental care twice a year:  NO    Sleep apnea or symptoms of sleep apnea:  None    Diet:  Regular (no restrictions)    Frequency of exercise:  None    Taking medications regularly:  Yes    Medication side effects:  None    PHQ-2 Total Score: 3    Additional concerns today:  No       Today, his parents report that there has not been much change in cognition.  He is healthy physically.  He eats PO and through syringe.  He takes medication orally.  He does not like to be touched.  He has physical therapy.  He is going to get a standing form - . No skin breakdown.  Scot was nonverbal at this visit.  He appeared calm and detached.    Have you ever done Advance Care Planning? (For example, a Health Directive, POLST, or a discussion with a medical provider or your loved ones about your wishes): Yes, advance care planning is on file.       Fall risk: Wheelchair-bound       Cognitive Screening Not appropriate due to mental handicap      Reviewed and updated as needed this visit by clinical staff                  Reviewed and updated as needed this visit by Provider                 Social History     Tobacco Use     Smoking status: Former     Packs/day: 0.00     Years: 0.00     Pack years: 0.00     Types: Cigarettes     Start date: 1995     Quit date: 2020     Years since quittin.5     Smokeless tobacco: Never   Substance Use Topics     Alcohol use: Not Currently     If you drink alcohol do you typically have >3 drinks per day or >7 drinks per week? No    Alcohol Use 11/15/2021   Prescreen: >3 drinks/day or >7 drinks/week? Not Applicable       PROBLEMS TO ADD ON...  1. Rash on chest,  noted this morning - discussed    Current providers sharing in care for this patient include:   Patient Care Team:  Cachorro Morales MD as PCP - General (Internal Medicine)  Scot Matamoros MD as MD (Cardiology)  Cachorro Morales MD as Assigned PCP  EVELINA Myles CM as   José Miguel, group home  as   Melanie, financial worker as Financial Resource Worker  Elsa Rahman NP as Assigned Behavioral Health Provider  Bibi Marcus MD as Assigned Heart and Vascular Provider  Candace Perez RN as Lead Care Coordinator    The following health maintenance items are reviewed in Epic and correct as of today:  Health Maintenance   Topic Date Due     ANNUAL REVIEW OF HM ORDERS  Never done     HEPATITIS B IMMUNIZATION (1 of 3 - 3-dose series) Never done     Pneumococcal Vaccine: Pediatrics (0 to 5 Years) and At-Risk Patients (6 to 64 Years) (2 - PCV) 10/13/2021     COVID-19 Vaccine (4 - Booster for Moderna series) 01/11/2022     PHQ-9  05/16/2022     INFLUENZA VACCINE (1) 09/01/2022     YEARLY PREVENTIVE VISIT  11/16/2022     DTAP/TDAP/TD IMMUNIZATION (8 - Td or Tdap) 08/21/2024     LIPID  11/16/2026     ADVANCE CARE PLANNING  11/16/2026     HEPATITIS C SCREENING  Completed     HIV SCREENING  Completed     DEPRESSION ACTION PLAN  Completed     IPV IMMUNIZATION  Completed     MENINGITIS IMMUNIZATION  Aged Out       Immunization History   Administered Date(s) Administered     COVID-19,PF,Moderna 12/28/2020, 01/25/2021     COVID-19,PF,Moderna Booster 11/16/2021     DTAP (<7y) 1979, 1979, 01/09/1980, 02/02/1981, 07/02/1984     Influenza Vaccine IM > 6 months Valent IIV4 (Alfuria,Fluzone) 11/16/2021     MMR 11/12/1980     Pneumococcal 23 valent 08/08/2020, 10/13/2020     Polio, Unspecified  1979, 1979, 02/02/1981, 07/02/1984     TD (ADULT, 7+) 08/17/2005     Tdap (Adult) Unspecified Formulation 08/21/2014             Review of Systems  "  Constitutional: Negative for chills and fever.   HENT: Negative for congestion, ear pain, hearing loss and sore throat.    Eyes: Negative for pain and visual disturbance.   Respiratory: Negative for cough and shortness of breath.    Cardiovascular: Negative for chest pain, palpitations and peripheral edema.   Gastrointestinal: Negative for abdominal pain, constipation, diarrhea, heartburn, hematochezia and nausea.   Genitourinary: Negative for dysuria, frequency, genital sores, hematuria, impotence, penile discharge and urgency.   Musculoskeletal: Negative for arthralgias, joint swelling and myalgias.   Skin: Negative for rash.   Neurological: Positive for paresthesias. Negative for dizziness, weakness and headaches.   Psychiatric/Behavioral: Negative for mood changes. The patient is not nervous/anxious.        OBJECTIVE:   BP 98/64   Pulse 78   Temp 97.2  F (36.2  C) (Temporal)   Resp 14   Wt 67.1 kg (148 lb)   SpO2 100%   BMI 21.86 kg/m   Estimated body mass index is 21.86 kg/m  as calculated from the following:    Height as of 7/22/22: 1.753 m (5' 9\").    Weight as of this encounter: 67.1 kg (148 lb).  Physical Exam  GENERAL: alert and no distress  EYES: Eyes grossly normal to inspection, PERRL and conjunctivae and sclerae normal  NECK: no adenopathy, no asymmetry, masses, or scars and thyroid normal to palpation  RESP: lungs clear to auscultation - no rales, rhonchi or wheezes  CV: regular rate and rhythm, normal S1 S2, no S3 or S4, no murmur, click or rub,  ABDOMEN: soft, nontender, no hepatosplenomegaly, no masses and bowel sounds normal  FT in place  MS: no gross musculoskeletal defects noted, no edema  NEURO: Wheelchair-bound.  Noted prior TBI.  No acute changes from baseline  Chest wall demonstrates numerous superficial inflammatory papules with mild pustular component.  Some of them are pea-sized.  PSYCH: Baseline nonverbal.    ASSESSMENT / PLAN:   (Z00.00) Routine general medical examination at a " "health care facility  (primary encounter diagnosis)  Comment: Will give COVID booster as well as influenza vaccine today.  Plan: CBC with platelets, Comprehensive metabolic         panel, Lipid Profile          Routine standing orders signed.  Recommend annual dental and eye exams as able.    Patient does not appear at risk to if needed to be physically restrained.  Routine CPR and CODE STATUS per family wishes.  Would recommend annual flu vaccine..    (S06.9X9S) Traumatic brain injury with loss of consciousness, sequela (H)  Comment: Noted as baseline.  Continuing with supportive care and feedings as needed.  Suggest watching daily weights.  Encourage hydration.  Plan: Water flushes per feeding tube    (I21.4) Non-ST elevation (NSTEMI) myocardial infarction (H)  Comment: Stable and continuing with risk reduction therapy.  Check lipid panel.  Plan: Lipid Profile            (Z13.6) CARDIOVASCULAR SCREENING; LDL GOAL LESS THAN 100  Comment: Labs ordered as fasting per routine  Plan: Lipid Profile            (L98.9) Skin lesion  Comment: Patient with a known history of MRSA.  Continuing with conservative care plus addition of Bactrim DS 1 capsule twice daily x7 days.  Plan: sulfamethoxazole-trimethoprim (BACTRIM DS)         800-160 MG tablet              Patient has been advised of split billing requirements and indicates understanding: Yes      COUNSELING:  Reviewed preventive health counseling, as reflected in patient instructions       Regular exercise       Healthy diet/nutrition      BMI:   Estimated body mass index is 26.58 kg/m  as calculated from the following:    Height as of 7/22/22: 1.753 m (5' 9\").    Weight as of 7/22/22: 81.6 kg (180 lb).         He reports that he quit smoking about 2 years ago. He started smoking about 27 years ago. He has never used smokeless tobacco.      Appropriate preventive services were discussed with this patient, including applicable screening as appropriate for cardiovascular " disease, diabetes, osteopenia/osteoporosis, and glaucoma.  As appropriate for age/gender, discussed screening for colorectal cancer, prostate cancer, breast cancer, and cervical cancer. Checklist reviewing preventive services available has been given to the patient.    Reviewed patients plan of care and provided an AVS. The Basic Care Plan (routine screening as documented in Health Maintenance) for Scot meets the Care Plan requirement. This Care Plan has been established and reviewed with the Patient.          Cachorro Morales MD  [facility]    Identified Health Risks:  Answers for HPI/ROS submitted by the patient on 11/17/2022

## 2022-11-18 ENCOUNTER — PATIENT OUTREACH (OUTPATIENT)
Dept: CARE COORDINATION | Facility: CLINIC | Age: 42
End: 2022-11-18

## 2022-11-18 NOTE — PROGRESS NOTES
Clinic Care Coordination Contact  [facility]/Voicemail    Referral Source: Care Team  Clinical Data: Care Coordinator Outreach  Outreach attempted x 2.  Left message on patient's voicemail with call back information and requested return call.    Plan: Care Coordinator will try to reach patient again in 1 month.     Candace Perez RN, BSN, PHN  Primary Care / Care Coordinator   [facility]  E-mail [email or web address]   [phone]

## 2022-11-21 ENCOUNTER — VIRTUAL VISIT (OUTPATIENT)
Dept: PSYCHIATRY | Facility: CLINIC | Age: 42
End: 2022-11-21
Payer: COMMERCIAL

## 2022-11-21 DIAGNOSIS — F09 COGNITIVE DISORDER: ICD-10-CM

## 2022-11-21 DIAGNOSIS — F34.81 DISRUPTIVE MOOD DYSREGULATION DISORDER (H): Primary | ICD-10-CM

## 2022-11-21 PROCEDURE — 99214 OFFICE O/P EST MOD 30 MIN: CPT | Mod: 95 | Performed by: NURSE PRACTITIONER

## 2022-11-21 RX ORDER — VALPROIC ACID 250 MG/5ML
125 SOLUTION ORAL 2 TIMES DAILY
Qty: 150 ML | Refills: 3 | Status: SHIPPED | OUTPATIENT
Start: 2022-11-21 | End: 2023-02-02

## 2022-11-21 NOTE — PROGRESS NOTES
"Scot is a 42 year old who is being evaluated via a billable video visit.      How would you like to obtain your AVS? MyChart  If the video visit is dropped, the invitation should be resent by: Text to cell phone: [phone]  Will anyone else be joining your video visit? No        Video-Visit Details    Video Start Time: 10:58 AM    Type of service:  Video Visit    Video End Time:11:24 AM    Originating Location (pt. Location): Home        Distant Location (provider location):  Off-site    Platform used for Video Visit: [facility]           Outpatient Psychiatric Progress Note    Name: Scot Bishop   : 1980                    Primary Care Provider: Cachorro Morales MD   Therapist: None    PHQ-9 scores:  PHQ-9 SCORE 11/15/2021 2022   PHQ-9 Total Score MyChart 4 (Minimal depression) 11 (Moderate depression)   PHQ-9 Total Score 4 11       CLEMENT-7 scores:  CLEMENT-7 SCORE 2022   Total Score 12 (moderate anxiety)   Total Score 12       Patient Identification:    Patient is a 42 year old year old, single  White Not  or  male  who presents for return visit with me.  Patient is currently disabled. Patient attended the session with His parents and care team staff , who they agreed to have interview with. Patient prefers to be called: \" Scot\".    Current medications include: acetaminophen (TYLENOL) 325 MG tablet, Take 1 tablet (325 mg) by mouth every 6 hours as needed for mild pain or fever  aspirin (ASPIRIN LOW DOSE) 81 MG chewable tablet, TAKE ONE TABLET PER G TUBE EVERY DAY  atorvastatin (LIPITOR) 40 MG tablet, TAKE ONE TABLET PER G TUBE EVERY NIGHT AT BEDTIME  bacitracin 500 UNIT/GM external ointment,   docusate sodium (COLACE) 100 MG capsule, Take 1 capsule (100 mg) by mouth 2 times daily as needed for constipation  hydrOXYzine (ATARAX) 10 MG tablet, Take 1 tablet (10 mg) by mouth daily as needed for anxiety (30-60 minutes before appointments)  LORazepam (ATIVAN) 0.5 MG tablet, Take 0.5 " tablets (0.25 mg) by mouth 2 times daily  midodrine (PROAMATINE) 2.5 MG tablet, 1 tablet (2.5 mg) by Oral or Feeding Tube route 3 times daily (with meals)  mineral oil-hydrophilic petrolatum (AQUAPHOR) external ointment, APPLY TO FEET AND LEGS TWICE DAILY AS NEEDED  Neomycin-Bacitracin-Polymyxin (SM TRIPLE ANTIBIOTIC ORIGINAL) 3.5-400-5000 OINT, APPLY TO AFFECTED AREA TWICE A DAY AS NEEDED  nystatin (MYCOSTATIN) 170908 UNIT/GM external cream, APPLY TO BUTTOCKS TWICE DAILY AS NEEDED  PARoxetine (PAXIL) 30 MG tablet, Take 1 tablet (30 mg) by mouth every morning  sodium chloride 1 GM tablet, Take 1 tablet (1 g) by mouth daily as needed (for low blood pressure before PT session)  sulfamethoxazole-trimethoprim (BACTRIM DS) 800-160 MG tablet, Take 1 tablet by mouth 2 times daily  Valproate Sodium (VALPROIC ACID) 250 MG/5ML, Take 125 mg (2.5 ml) in the morning and 250 mg (5 ml)  In the evening    No current facility-administered medications on file prior to visit.       The [place] Prescription Monitoring Program has been reviewed and there are no concerns about diversionary activity for controlled substances at this time.      I was able to review most recent Primary Care Provider, specialty provider, and therapy visit notes that I have access to.     Today, his parents and care team staff report that Scot is mostly stable.  He will have occasional irritability episodes but is mostly manageable.  There is concern over lack of use of muscles and he is unable to stand and bear weight on his legs.  He has to be fed by other people.  He is nonverbal.  When people saying to him, he can smile and laugh.  His parents report he is not expressive towards his children.  He has not been having crying episodes.  He does not follow commands.  He has started tilting his head to the right when he is in a sitting or lying position.  He rarely uses his hands.  His parents are disappointed in the lack of progress he has made towards  becoming more responsive.     has a past medical history of Acute respiratory failure with hypoxia (H), Aggression (11/09/2020), Agitation (09/21/2020), LOWELL (acute kidney injury) (H), Back injury, sequela (10/27/2017), Cardiac arrest (H) (05/17/2020), Cardiac arrest (H), Cognitive disorder (11/11/2020), Depressive disorder, Dysphagia (11/11/2020), Dysphagia, Gastrojejunostomy tube status (H) (11/11/2020), HTN (hypertension), Hypoxic ischemic encephalopathy, Low blood pressure (09/17/2020), Nonverbal (11/11/2020), NSTEMI (non-ST elevated myocardial infarction) (H), and TBI (traumatic brain injury) (06/25/2020).    Social history updates:    Scot continues to live in an assisted care facility/nursing home.    Substance use updates:    No alcohol use  Tobacco use: No    Vital Signs:   There were no vitals taken for this visit.    Labs:    Most recent laboratory results reviewed and no new labs.     Mental Status Examination:  Appearance: awake, alert and casual dress  Attitude: evasive  Eye Contact:  looking around room  Gait and Station: No dizziness or falls and Uses South lift for all transfers  Psychomotor Behavior:  Chair bound  Oriented to:  Person  Attention Span and Concentration:  Poor  Speech:   mute  Mood:  calm  Affect:  restricted range and nonreactive  Associations:  Unable to assess  Thought Process:  linear  Thought Content:  no evidence of suicidal ideation or homicidal ideation  Recent and Remote Memory:  poor Not formally assessed. No amnesia.  Fund of Knowledge: Low  Insight:  limited  Judgment:  poor  Impulse Control:  poor    Suicide Risk Assessment:  Today Scot Bishop reports no thoughts to harm themself or others. In addition, there are notable risk factors for self-harm, including anxiety, comorbid medical condition of Traumatic brain injury with severe limitations and mood change. However, risk is mitigated by receives 24-hour supervised care. Therefore, based on all available evidence  including the factors cited above, Scot Bishop does not appear to be at imminent risk for self-harm, does not meet criteria for a 72-hr hold, and therefore remains appropriate for ongoing outpatient level of care.  A thorough assessment of risk factors related to suicide and self-harm have been reviewed and are noted above. The patient convincingly denies suicidality on several occasions. Local community safety resources printed and reviewed for patient to use if needed. There was no deceit detected, and the patient presented in a manner that was believable.     DSM5 Diagnosis:  296.99 (F34.8) Disruptive Mood Dysregulation Disorder    Medical comorbidities include:   Patient Active Problem List    Diagnosis Date Noted     Hypotension, unspecified hypotension type 01/17/2022     Priority: Medium     CARDIOVASCULAR SCREENING; LDL GOAL LESS THAN 100 11/16/2021     Priority: Medium     Ischemic encephalopathy 09/29/2021     Priority: Medium     Last Assessment & Plan:   Formatting of this note might be different from the original.  Hypoxic brain injury 5/2020.   Largely non-verbal.  Not following commands.   Lip smacking mouth movements.     Plan   - check EEG to evaluate for seizures   - continue therapy evaluations   - prognosis - guarded, history and exam are consistent with severe, permanent brain injury       Nicotine dependence 04/16/2021     Priority: Medium     Formatting of this note might be different from the original.  Created by Conversion       Thrombocytopenia, unspecified (H) 04/16/2021     Priority: Medium     Thrush, oral 12/09/2020     Priority: Medium     Advance care planning 12/04/2020     Priority: Medium     Formatting of this note might be different from the original.  Community Palliative Care was asked to see patient by inpatient palliative care on 11/23 for continuation of palliative care in a new setting.  Date of last visit: 12/9/2020    Formatting of this note is different from the  original.  Advance Care Planning   Patient has identified Health Care Agent(s): Yes  Add Health Care Agents: Yes    Health Care Agent(s):  Guardian: Gerald Bishop Relationship: father Phone: [phone]     Patient has Advance Care Plan Documents (Health Care Directive, POLST): Yes    Advance Care Plan Documents:  POLST Form     Patient has identified Specific Treatment Preferences: Yes     How have preferences been verified: POLST    Specific Treatment Preferences:   a.) Code Status:  DNR/ Do Not Attempt Resuscitation - Allow a Natural Death  b.) Goals of Treatment:   ii. Selective Treatment: Use medical treatment, antibiotics, IV fluids and cardiac monitor as indicated. No intubation, advanced airway interventions, or mechanical ventilation. May consider less invasive airway support (e.g. CPAP, BiPAP). Transfer to hospital if indicated. Generally avoid the intensive care unit. All patients will receive comfort-focused treatments.  TREATMENT PLAN: Provide basic medical treatments aimed at treating new or reversible illness.  c.) Interventions and Treatments:  i.  Artificially Administered Nutrition and Hydration: - Long term artificial nutrion/hydration by tube through (not specified) (specify mouth/nose) and (not specified) (specify if ok with directly into GI tract)  ii.  Antibiotics: - Oral antibiotics only (NO IV/IM)       Acute respiratory failure with hypoxia (H) 12/04/2020     Priority: Medium     Gastrojejunostomy tube status (H) 11/11/2020     Priority: Medium     Cognitive disorder 11/11/2020     Priority: Medium     Aggression 11/09/2020     Priority: Medium     Other symptoms and signs involving appearance and behavior 11/09/2020     Priority: Medium     Agitation 09/21/2020     Priority: Medium     G tube feedings (H) 09/03/2020     Priority: Medium     Aphasia 08/05/2020     Priority: Medium     Dysphagia 08/04/2020     Priority: Medium     Non-ST elevation (NSTEMI) myocardial infarction (H)  07/31/2020     Priority: Medium     Cardiac arrest due to other underlying condition (H) 07/31/2020     Priority: Medium     Encephalopathy, unspecified 07/31/2020     Priority: Medium     Dysphagia, unspecified 07/31/2020     Priority: Medium     Diffuse traumatic brain injury with loss of consciousness of unspecified duration, subsequent encounter 07/31/2020     Priority: Medium     Hypoxic ischemic encephalopathy (HIE), unspecified 07/31/2020     Priority: Medium     Traumatic brain injury 06/25/2020     Priority: Medium     Cardiac arrest (H) 05/17/2020     Priority: Medium     Back injury, sequela 10/27/2017     Priority: Medium     Formatting of this note might be different from the original.  WC Case w/ [employer] ([email or web address])  CLM #[id number];  Lydia Machuca [phone]         Assessment:    Scot Bishop remains unable to speak or stand.  His family is concerned that he continues to deteriorate in physical strength.  The family was told that the physical therapy providers are unable to do much more to help him regain strength in his legs.  He is unable to follow simple commands.  Scot interacted very little with me today.  He was able to smile and look at his father when his father saying to him.  The family is disappointed that Scot has not made better progress in regaining some of his abilities and now seems to be digressing.  Today we decreased the dose of valproic acid as we consider discontinuing medications that may increase fatigue and low energy in him..    Medication side effects and alternatives were reviewed. Health promotion activities recommended and reviewed today. All questions addressed. Education and counseling completed regarding risks and benefits of medications and psychotherapy options.    Treatment Plan:        1.  Decrease valproic acid to 125 mg twice daily    2.  Continue lorazepam 0.25 mg twice daily    3.  Continue paroxetine 30 mg daily    4.   Continue hydroxyzine 10 mg as needed 30 to 60 minutes before appointments        Continue all other medications as reviewed per electronic medical record today.     Safety plan reviewed. To the Emergency Department as needed or call after hours crisis line at [phone] or [phone]. [place] Crisis Text [place]. Text [place] to 051326 or Suicide LifeLine Chat: suicideDog Digital.org/chat/    To schedule individual or family therapy, call [facility] at [phone]    Schedule an appointment with me in 3 months or sooner as needed. Call [facility] at [phone] to schedule.    Follow up with primary care provider as planned or for acute medical concerns.    Call the psychiatric nurse line with medication questions or concerns at [phone]    MyChart may be used to communicate with your provider, but this is not intended to be used for emergencies.    Crisis Resources:    National Suicide Prevention Lifeline: 185.721.5518 (TTY: 461.433.7574). Call anytime for help.  (www.suicidepreventionlifeline.org)  National [place] on Mental Illness (www.melvin.org): 600.677.3908 or 696-860-7815.   Mental Health Association (www.mentalhealth.org): 212.943.2601 or 783-243-6671.  [place] Crisis Text [place]: Text [place] to 218001  Suicide LifeLine Chat: suicideDog Digital.org/chat    Administrative Billing:   Time spent with patient includes counseling and coordination of care regarding above diagnoses and treatment plan.    Patient Status:  Patient will continue to be seen for ongoing consultation and stabilization.    Signed:   LASHAWN Jeong-BC   Psychiatry

## 2022-11-21 NOTE — PATIENT INSTRUCTIONS
1.  Decrease valproic acid to 125 mg twice daily  2.  Continue lorazepam 0.25 mg twice daily  3.  Continue paroxetine 30 mg daily  4.  Continue hydroxyzine 10 mg as needed 30 to 60 minutes before appointments    Continue all other medications as reviewed per electronic medical record today.   Safety plan reviewed. To the Emergency Department as needed or call after hours crisis line at [phone] or [phone]. [place] Crisis Text Line. Text [place] to 673414 or Suicide LifeLine Chat: suicideNavigenics.org/chat/  To schedule individual or family therapy, call [facility] at [phone]  Schedule an appointment with me in 3 months or sooner as needed. Call [facility] at [phone] to schedule.  Follow up with primary care provider as planned or for acute medical concerns.  Call the psychiatric nurse line with medication questions or concerns at [phone]  MyChart may be used to communicate with your provider, but this is not intended to be used for emergencies.    Crisis Resources:    National Suicide Prevention Lifeline: 334.610.6236 (TTY: 463.881.9043). Call anytime for help.  (www.suicidepreventionlifeline.org)  National [place] on Mental Illness (www.melvin.org): 143.877.6795 or 234-683-5156.   Mental Health Association (www.mentalhealth.org): 586.754.4964 or 067-051-6233.  [place] Crisis Text Line: Text [place] to 357761  Suicide LifeLine Chat: suicideNavigenics.org/chat

## 2022-11-28 NOTE — OP NOTE
OPERATIVE DATE: 5/19/2020    PRE-OPERATIVE DIAGNOSIS:  1) VT cardiac arrest  2) Right femoral VA ECMO  Patient Active Problem List   Diagnosis     Cardiac arrest (H)     POST-OPERATIVE DIAGNOSIS:  1) VT cardiac arrest  2) Right femoral VA ECMO  Patient Active Problem List   Diagnosis     Cardiac arrest (H)     PROCEDURE:  1) VA ECMO decannulation  2) Repair of right femoral vessels    SURGEON: Mariano Workman MD    ASSISTANT: Brice Murphy MD    ANESTHESIA: GETA    ESTIMATED BLOOD LOSS: 50cc    INDICATIONS:  Mr. FAINA FORRESTER is a 39 year old male admitted with VT cardiac arrest. I was asked to evaluate for ECMO decannulation.  Risks and benefits of the operation were explained to the patient and their family including, but not limited to, bleeding, infection, stroke and even death.  They understood these risks and agreed to proceed electively.    OPERATIVE REPORT:  The patient was transferred to the operating room and positioned supine on the OR table.  General anesthesia was initiated by the anesthesia team.  Endotracheal intubation and IV access was already in place. The patients chest, abdomen and bilateral lower extremities were clipped, prepped and draped in sterile fashion.  A pre-procedure time-out was performed confirming the correct patient, correct site and correct procedure.    A transesophageal echocardiogram was performed.  The patient was given 5000 U IV heparin.  Flow on the circuit was decreased to 1L.  An ABG after 5 minutes was stable.    A longitudinal skin incision was made in the right groin crease.  The femoral vessels were isolated.  Vessel loops were placed around the femoral artery proximal and distal to the access site.  A 4-0 prolene pursestring was placed around the venous cannula.  Flow on the circuit was stopped.  The venous cannula was removed.  The site was oversewed with 4-0 prolene.  Next the arterial cannula was removed.  The site was repaired with 5-0 prolene stitches.   Vaccine Information Sheet, \"Influenza - Inactivated\"  given to Brooks Broussard, or parent/legal guardian of  Brooks Broussard and verbalized understanding. Patient responses:    Have you ever had a reaction to a flu vaccine? No  Do you have any current illness? No  Have you ever had [place] Syndrome? No  Do you have a serious allergy to any of the follow: Neomycin, Polymyxin, Thimerosal, eggs or egg products? No    Flu vaccine given per order. Please see immunization tab. Risks and benefits explained. Current VIS given.     The wound was made hemostatic.  The incision was closed in layers using vicryl stitches.  Nylon stitches were used to approximate the skin.  A Proveena skin vac was applied.       The patient was then transferred from the operating bed to an ICU bed and transferred to the ICU in critical, but stable, condition.    All needle, sponge and instrument counts were correct at the end of the case.    Mariano Workman  Cardiothoracic Surgery  Pager: [phone]

## 2022-12-07 DIAGNOSIS — I95.9 HYPOTENSION, UNSPECIFIED HYPOTENSION TYPE: ICD-10-CM

## 2022-12-07 RX ORDER — MIDODRINE HYDROCHLORIDE 2.5 MG/1
TABLET ORAL
Qty: 270 TABLET | Refills: 0 | Status: SHIPPED | OUTPATIENT
Start: 2022-12-07 | End: 2023-03-23
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
## 2022-12-20 ENCOUNTER — PATIENT OUTREACH (OUTPATIENT)
Dept: CARE COORDINATION | Facility: CLINIC | Age: 42
End: 2022-12-20

## 2022-12-20 NOTE — PROGRESS NOTES
Clinic Care Coordination Contact  [facility]/Voicemail    Referral Source: Care Team  Clinical Data: Care Coordinator Outreach  Outreach attempted x 3.  Left message on patient's voicemail with call back information and requested return call.    Plan: Care Coordinator will try to reach patient again in 1 month and if unable to contact Gerald, legal guardian, may disenroll from care coordination.     Candace Perez RN, BSN, PHN  Primary Care / Care Coordinator   [facility]  E-mail [email or web address]   [phone]

## 2023-01-10 ENCOUNTER — MYC REFILL (OUTPATIENT)
Dept: PSYCHIATRY | Facility: CLINIC | Age: 43
End: 2023-01-10

## 2023-01-10 DIAGNOSIS — F34.81 DISRUPTIVE MOOD DYSREGULATION DISORDER (H): ICD-10-CM

## 2023-01-10 NOTE — TELEPHONE ENCOUNTER
Date of Last Office Visit: 11/21/22  Date of Next Office Visit: 2/2/2023  No shows since last visit: 0  Cancellations since last visit: 0    Medication requested: LORazepam (ATIVAN) 0.5 MG tablet Date last ordered: 9/15/22 Qty: 30 Refills: 3    Medication requested: PARoxetine (PAXIL) 30 MG tablet Date last ordered: 9/15/2022 Qty: 30 Refills: 3     Review of [place] ?: Yes  Medication last filled date: 12/19/22 Qty filled: 28  Other controlled substance on [place] ?: No    Lapse in medication adherence greater than 5 days?: No    Medication refill request verified as identical to current order?: Yes  Result of Last DAM, VPA, Li+ Level, CBC, or Carbamazepine Level (at or since last visit): N/A    Last visit treatment plan:   Treatment Plan:          1.  Decrease valproic acid to 125 mg twice daily    2.  Continue lorazepam 0.25 mg twice daily    3.  Continue paroxetine 30 mg daily    4.  Continue hydroxyzine 10 mg as needed 30 to 60 minutes before appointments         Continue all other medications as reviewed per electronic medical record today.     Safety plan reviewed. To the Emergency Department as needed or call after hours crisis line at [phone] or [phone]. [place] Crisis Text Line. Text [place] to 512765 or Suicide LifeLine Chat: suicidepreventionlifeline.org/chat/    To schedule individual or family therapy, call [facility] at [phone]    Schedule an appointment with me in 3 months or sooner as needed.     []Medication refilled per  Medication Refill in Ambulatory Care  policy.  [x]Medication unable to be refilled by RN due to criteria not met as indicated below:    []Eligibility - not seen in the last year   []Supervision - no future appointment   []Compliance - no shows, cancellations or lapse in therapy   []Verification - order discrepancy   [x]Controlled medication   []Medication not included in policy   []90-day supply request   []Other

## 2023-01-13 RX ORDER — LORAZEPAM 0.5 MG/1
0.25 TABLET ORAL
Qty: 30 TABLET | Refills: 3 | Status: SHIPPED | OUTPATIENT
Start: 2023-01-13 | End: 2023-04-03

## 2023-01-13 RX ORDER — PAROXETINE 30 MG/1
30 TABLET, FILM COATED ORAL EVERY MORNING
Qty: 30 TABLET | Refills: 3 | Status: SHIPPED | OUTPATIENT
Start: 2023-01-13 | End: 2023-04-03

## 2023-01-20 ENCOUNTER — PATIENT OUTREACH (OUTPATIENT)
Dept: NURSING | Facility: CLINIC | Age: 43
End: 2023-01-20

## 2023-01-20 NOTE — PROGRESS NOTES
"Clinic Care Coordination Contact    Follow Up Progress Note      Assessment:   Gerald, father and legal guardian, states patient \"seems to be doing well\" at the [facility] [place].  Patient continues with outpatient Physical Therapy through [facility] and still has a G Tube.  Patient does get water orally administered by staff at group home through a syringe.  Per Gerald, they are still looking for PCA services for patient and [place]; they're hoping they will have a PCA by this summer that [place]    Care Gaps:    Health Maintenance Due   Topic Date Due     HEPATITIS B IMMUNIZATION (1 of 3 - 3-dose series) Never done     Pneumococcal Vaccine: Pediatrics (0 to 5 Years) and At-Risk Patients (6 to 64 Years) (2 - PCV) 10/13/2021     Care Gaps Last addressed on 1/20/2023    Care Plans  Care Plan: Physical Therapy     Problem: Lifestyle choices     Goal: Functional     Start Date: 8/20/2021 Expected End Date: 4/20/2023    This Visit's Progress: 90% Recent Progress: 90%    Note:     Goal Statement: Patient will continue Physical Therapy/Outpatient Therapy.  Barriers: Deconditioned   Strengths:Supportive parents   Patient/caregiver expressed understanding of goal: Yes  Action steps to achieve this goal:  1. I/caregiver will keep future appointments  2. I/caregiver will discuss, review, schedule and complete recommended overdue health maintenance with my primary care provider  3. I/caregiver will contact my care team with questions, concerns, support needs   4. I/caregiver will use the clinic as a resource, and I understand I can contact my clinic with 24/7 after hours services available                       Intervention/Education provided during outreach:   RNCC called and spoke with patient; introduced self, discussed role of Care Coordination and explained reason for call    Plan:   -Patient will contact the " care team with questions, concerns, support needs   -Patient will use the clinic as a resource and understands (s)he can contact the [facility] with 24/7 after hours services available  -Care Coordinator will remain available as needed  -RNCC will follow up in one month for follow up appointments/recommendations and goal progression     Candace Perez RN, BSN, PHN  Primary Care / Care Coordinator   [facility]  E-mail [email or web address]   [phone]

## 2023-01-20 NOTE — LETTER
[address] [facility] [address]    [facility]  Patient Centered Plan of Care  About Me:        Patient Name:  Scot Bishop    YOB: 1980  Age:         42 year old   Heather MRN:    [id number] Telephone Information:  Home Phone [phone]   Mobile [phone]       Address:  [address] [address] Email address:  [email or web address]      Emergency Contact(s)    Name Relationship Lgl Grd Work Phone Home Phone Mobile Phone   1. NIGEL BISHOP* Father Yes  [phone]   2. MITZYSUZI* Mother No  [phone]   3. [facility]*   [phone]    4. WALKER RAMAN D*     [phone]   5. MITZYLOS Stepparent No  [phone]           Primary language:  English     needed? No   Heather Language Services:  [phone] op. 1  Other communication barriers:Cognitive impairment; Other (Non verbal)    Preferred Method of Communication:  Margie  Current living arrangement: Other ([facility])    Mobility Status/ Medical Equipment: Dependent/Assisted by Another    Health Maintenance  Health Maintenance Reviewed: Due/Overdue   Health Maintenance Due   Topic Date Due     HEPATITIS B IMMUNIZATION (1 of 3 - 3-dose series) Never done     Pneumococcal Vaccine: Pediatrics (0 to 5 Years) and At-Risk Patients (6 to 64 Years) (2 - PCV) 10/13/2021     My Access Plan  Medical Emergency 911   Primary Clinic Line   - [phone]   24 Hour Appointment Line [phone] or  [phone]) (toll-free)   24 Hour Nurse Line [phone] (toll-free)   Preferred Urgent Care [facility], [phone]     Preferred Hospital [facility]  [phone]     Preferred Pharmacy [facility] - [place], [place] - [address]     Behavioral Health Crisis Line The National Suicide Prevention Lifeline at 1-836.922.2470 or Text/Call 868     My Care  Team Members  Patient Care Team       Relationship Specialty Notifications Start End    Cachorro Morales MD PCP - General Internal Medicine  1/19/21     STILL CURRENT AS OF 07/22/2022    Phone: [phone] Fax: [phone]         [address] [place] [address]    Scot Matamoros MD MD Cardiology  6/12/20     Phone: [phone] Fax: [phone]         [address] [facility] [address]    Cachorro Morales MD Assigned PCP   12/24/20     Phone: [phone] Fax: [phone]         [address] [place] [address]    EVELINA Myles CM    2/5/21     [facility]    Phone: [phone]         [facility] group home     2/5/21     [facility] group home    Phone: [phone] Fax: [phone]        Melanie financial worker Financial Resource Worker   2/5/21     [facility]    Phone: [phone]         Elsa Rahman NP Assigned Behavioral Health Provider   2/14/21     Phone: [phone] Fax: [phone]         [address] [place] [address]    Bibi Marcus MD Assigned Heart and Vascular Provider   3/17/21     Phone: [phone] Pager: [phone] Fax: [phone]        [address] [facility] [address]    Candace Perez RN Lead Care Coordinator  [facility] 8/19/21             My Care Plans  Self Management and Treatment Plan  Care Plan  Care Plan: Physical Therapy     Problem: Lifestyle choices     Goal: Functional     Start Date: 8/20/2021 Expected End Date: 4/20/2023    This Visit's Progress: 90% Recent Progress: 90%    Note:     Goal Statement: Patient will continue Physical Therapy/Outpatient Therapy.  Barriers: Deconditioned   Strengths:Supportive parents   Patient/caregiver expressed understanding of goal: Yes  Action steps to achieve this goal:  1. I/caregiver will keep future appointments  2. I/caregiver will discuss, review, schedule and complete recommended overdue health maintenance with my primary  care provider  3. I/caregiver will contact my care team with questions, concerns, support needs   4. I/caregiver will use the clinic as a resource, and I understand I can contact my clinic with 24/7 after hours services available                          Action Plans on File:     Advance Care Plans/Directives Type:   Advanced Directive - On File; DNR/DNI      My Medical and Care Information  Problem List   Patient Active Problem List   Diagnosis     Cardiac arrest (H)     Traumatic brain injury     Thrush, oral     Nicotine dependence     Gastrojejunostomy tube status (H)     G tube feedings (H)     Advance care planning     Dysphagia     Cognitive disorder     Back injury, sequela     Agitation     Aggression     Acute respiratory failure with hypoxia (H)     Thrombocytopenia, unspecified (H)     CARDIOVASCULAR SCREENING; LDL GOAL LESS THAN 100     Hypotension, unspecified hypotension type     Aphasia     Non-ST elevation (NSTEMI) myocardial infarction (H)     Other symptoms and signs involving appearance and behavior     Cardiac arrest due to other underlying condition (H)     Encephalopathy, unspecified     Dysphagia, unspecified     Diffuse traumatic brain injury with loss of consciousness of unspecified duration, subsequent encounter     Hypoxic ischemic encephalopathy (HIE), unspecified     Ischemic encephalopathy      Current Medications and Allergies:  No Known Allergies  Current Outpatient Medications   Medication     acetaminophen (TYLENOL) 325 MG tablet     aspirin (ASPIRIN LOW DOSE) 81 MG chewable tablet     atorvastatin (LIPITOR) 40 MG tablet     bacitracin 500 UNIT/GM external ointment     docusate sodium (COLACE) 100 MG capsule     hydrOXYzine (ATARAX) 10 MG tablet     LORazepam (ATIVAN) 0.5 MG tablet     midodrine (PROAMATINE) 2.5 MG tablet     mineral oil-hydrophilic petrolatum (AQUAPHOR) external ointment     Neomycin-Bacitracin-Polymyxin (SM TRIPLE ANTIBIOTIC ORIGINAL) 3.5-400-5000 OINT      nystatin (MYCOSTATIN) 599960 UNIT/GM external cream     PARoxetine (PAXIL) 30 MG tablet     sodium chloride 1 GM tablet     sulfamethoxazole-trimethoprim (BACTRIM DS) 800-160 MG tablet     Valproate Sodium (VALPROIC ACID) 250 MG/5ML     No current facility-administered medications for this visit.     Care Coordination Start Date: 8/20/2021   Frequency of Care Coordination: monthly     Form Last Updated: 01/20/2023

## 2023-01-27 DIAGNOSIS — I46.9 CARDIAC ARREST (H): ICD-10-CM

## 2023-01-27 DIAGNOSIS — E78.5 HLD (HYPERLIPIDEMIA): ICD-10-CM

## 2023-01-30 DIAGNOSIS — I46.9 CARDIAC ARREST (H): ICD-10-CM

## 2023-01-31 DIAGNOSIS — I46.9 CARDIAC ARREST (H): ICD-10-CM

## 2023-01-31 DIAGNOSIS — E78.5 HLD (HYPERLIPIDEMIA): ICD-10-CM

## 2023-01-31 RX ORDER — ATORVASTATIN CALCIUM 40 MG/1
TABLET, FILM COATED ORAL
Qty: 90 TABLET | Refills: 0 | Status: SHIPPED | OUTPATIENT
Start: 2023-01-31 | End: 2023-04-24

## 2023-01-31 RX ORDER — ASPIRIN 81 MG/1
TABLET, CHEWABLE ORAL
Qty: 28 TABLET | Refills: 0 | OUTPATIENT
Start: 2023-01-31

## 2023-01-31 RX ORDER — ATORVASTATIN CALCIUM 40 MG/1
TABLET, FILM COATED ORAL
Qty: 28 TABLET | Refills: 2 | OUTPATIENT
Start: 2023-01-31

## 2023-01-31 RX ORDER — ASPIRIN 81 MG/1
TABLET, CHEWABLE ORAL
Qty: 90 TABLET | Refills: 0 | Status: SHIPPED | OUTPATIENT
Start: 2023-01-31 | End: 2023-04-24

## 2023-02-02 ENCOUNTER — VIRTUAL VISIT (OUTPATIENT)
Dept: PSYCHIATRY | Facility: CLINIC | Age: 43
End: 2023-02-02
Payer: COMMERCIAL

## 2023-02-02 DIAGNOSIS — F34.81 DISRUPTIVE MOOD DYSREGULATION DISORDER (H): Primary | ICD-10-CM

## 2023-02-02 PROCEDURE — 99214 OFFICE O/P EST MOD 30 MIN: CPT | Mod: VID | Performed by: NURSE PRACTITIONER

## 2023-02-02 NOTE — PROGRESS NOTES
"Checked in over the phone with mom and dad, they stated that they don't do the questionnaires for Scot.     Scot is a 42 year old who is being evaluated via a billable video visit.      How would you like to obtain your AVS? MyChart  If the video visit is dropped, the invitation should be resent by: Text to cell phone: [phone]  Will anyone else be joining your video visit? Yes, mom and dad, Karen and Gerald        Video-Visit Details    Type of service:  Video Visit   Video Start Time: 12:50 PM  Video End Time: 1:12 PM    Originating Location (pt. Location): Long term Care    Distant Location (provider location):  Off-site  Platform used for Video Visit: [facility]             Outpatient Psychiatric Progress Note    Name: Scot Bishop   : 1980                    Primary Care Provider: Cachorro Morales MD   Therapist: None    PHQ-9 scores:  PHQ-9 SCORE 11/15/2021 2022   PHQ-9 Total Score [facility] 4 (Minimal depression) 11 (Moderate depression)   PHQ-9 Total Score 4 11       CLEMENT-7 scores:  CLEMENT-7 SCORE 2022   Total Score 12 (moderate anxiety)   Total Score 12       Patient Identification:    Patient is a 42 year old year old, single  White Not  or  male  who presents for return visit with me.  Patient is currently disabled. Patient attended the session with His father and stepmother , who they agreed to have interview with. Patient prefers to be called: \" Scot\".    Current medications include: acetaminophen (TYLENOL) 325 MG tablet, Take 1 tablet (325 mg) by mouth every 6 hours as needed for mild pain or fever  aspirin (ASPIRIN LOW DOSE) 81 MG chewable tablet, TAKE ONE TABLET PER G TUBE EVERY DAY. Appointment required for further refills  atorvastatin (LIPITOR) 40 MG tablet, Take one tablet (40 mg) per G tube every night at bedtime. Appointment required for further refills  bacitracin 500 UNIT/GM external ointment,   docusate sodium (COLACE) 100 MG capsule, Take 1 capsule (100 mg) " by mouth 2 times daily as needed for constipation  hydrOXYzine (ATARAX) 10 MG tablet, Take 1 tablet (10 mg) by mouth daily as needed for anxiety (30-60 minutes before appointments)  LORazepam (ATIVAN) 0.5 MG tablet, Take 0.5 tablets (0.25 mg) by mouth 2 times daily  midodrine (PROAMATINE) 2.5 MG tablet, TAKE ONE TABLET BY MOUTH OR BY FEEDING TUBE THREE TIMES DAILY WITH MEALS Strength: 2.5 mg  mineral oil-hydrophilic petrolatum (AQUAPHOR) external ointment, APPLY TO FEET AND LEGS TWICE DAILY AS NEEDED  Neomycin-Bacitracin-Polymyxin (SM TRIPLE ANTIBIOTIC ORIGINAL) 3.5-400-5000 OINT, APPLY TO AFFECTED AREA TWICE A DAY AS NEEDED  nystatin (MYCOSTATIN) 170527 UNIT/GM external cream, APPLY TO BUTTOCKS TWICE DAILY AS NEEDED  PARoxetine (PAXIL) 30 MG tablet, Take 1 tablet (30 mg) by mouth every morning  sodium chloride 1 GM tablet, Take 1 tablet (1 g) by mouth daily as needed (for low blood pressure before PT session)  sulfamethoxazole-trimethoprim (BACTRIM DS) 800-160 MG tablet, Take 1 tablet by mouth 2 times daily  Valproate Sodium (VALPROIC ACID) 250 MG/5ML, Take 2.5 mLs (125 mg) by mouth 2 times daily    No current facility-administered medications on file prior to visit.       The [place] Prescription Monitoring Program has been reviewed and there are no concerns about diversionary activity for controlled substances at this time.      I was able to review most recent Primary Care Provider, specialty provider, and therapy visit notes that I have access to.     Today, patient's parents reported that he has been less verbal.  He has had no anger outbursts or combative behaviors that majorly disrupt his routine.  He is responsive to food cues only.  His father continues to be interested in ensuring that Scot takes as little medication as possible for mental health symptoms.  Patient does not seem to be expressing any type of emotional demeanor today.  He did become hyperexcitable verbally when the topic rock music was  mentioned.       has a past medical history of Acute respiratory failure with hypoxia (H), Aggression (11/09/2020), Agitation (09/21/2020), LOWELL (acute kidney injury) (H), Back injury, sequela (10/27/2017), Cardiac arrest (H) (05/17/2020), Cardiac arrest (H), Cognitive disorder (11/11/2020), Depressive disorder, Dysphagia (11/11/2020), Dysphagia, Gastrojejunostomy tube status (H) (11/11/2020), HTN (hypertension), Hypoxic ischemic encephalopathy, Low blood pressure (09/17/2020), Nonverbal (11/11/2020), NSTEMI (non-ST elevated myocardial infarction) (H), and TBI (traumatic brain injury) (06/25/2020).    Social history updates:    Patient continues to reside in an extended care facility with 24-hour staff caring for him.  His parents are supportive.    Substance use updates:    No alcohol use  Tobacco use: No    Vital Signs:   There were no vitals taken for this visit.    Labs:    Most recent laboratory results reviewed and no new labs.     Mental Status Examination:  Appearance: awake, alert and casual dress  Attitude: evasive  Eye Contact:  looking around room  Gait and Station: No dizziness or falls and Uses wheelchair  Psychomotor Behavior:  fidgeting  Oriented to:  Person  Attention Span and Concentration:  Poor  Speech:   mute  Mood:  anxious  Affect:  nonreactive  Associations:  Unable to assess  Thought Process:  linear  Thought Content:  patient appears to be responding to internal stimuli  Recent and Remote Memory:  poor Not formally assessed. No amnesia.  Fund of Knowledge: delayed  Insight:  limited  Judgment:  limited  Impulse Control:  poor    Suicide Risk Assessment:  Today Scot LINCOLN Bishop reports no thoughts to harm themself or others. In addition, there are notable risk factors for self-harm, including anxiety, comorbid medical condition of Traumatic brain injury and wrecklessness. However, risk is mitigated by 24-hour supervision and supportive family. Therefore, based on all available evidence  including the factors cited above, Scot Bishop does not appear to be at imminent risk for self-harm, does not meet criteria for a 72-hr hold, and therefore remains appropriate for ongoing outpatient level of care.  A thorough assessment of risk factors related to suicide and self-harm have been reviewed and are noted above. The patient convincingly denies suicidality on several occasions. Local community safety resources printed and reviewed for patient to use if needed. There was no deceit detected, and the patient presented in a manner that was believable.     DSM5 Diagnosis:  296.99 (F34.8) Disruptive Mood Dysregulation Disorder    Medical comorbidities include:   Patient Active Problem List    Diagnosis Date Noted     Hypotension, unspecified hypotension type 01/17/2022     Priority: Medium     CARDIOVASCULAR SCREENING; LDL GOAL LESS THAN 100 11/16/2021     Priority: Medium     Ischemic encephalopathy 09/29/2021     Priority: Medium     Last Assessment & Plan:   Formatting of this note might be different from the original.  Hypoxic brain injury 5/2020.   Largely non-verbal.  Not following commands.   Lip smacking mouth movements.     Plan   - check EEG to evaluate for seizures   - continue therapy evaluations   - prognosis - guarded, history and exam are consistent with severe, permanent brain injury       Nicotine dependence 04/16/2021     Priority: Medium     Formatting of this note might be different from the original.  Created by Conversion       Thrombocytopenia, unspecified (H) 04/16/2021     Priority: Medium     Thrush, oral 12/09/2020     Priority: Medium     Advance care planning 12/04/2020     Priority: Medium     Formatting of this note might be different from the original.  Community Palliative Care was asked to see patient by inpatient palliative care on 11/23 for continuation of palliative care in a new setting.  Date of last visit: 12/9/2020    Formatting of this note is different from the  original.  Advance Care Planning   Patient has identified Health Care Agent(s): Yes  Add Health Care Agents: Yes    Health Care Agent(s):  Guardian: Gerald Bishop Relationship: father Phone: [phone]     Patient has Advance Care Plan Documents (Health Care Directive, POLST): Yes    Advance Care Plan Documents:  POLST Form     Patient has identified Specific Treatment Preferences: Yes     How have preferences been verified: POLST    Specific Treatment Preferences:   a.) Code Status:  DNR/ Do Not Attempt Resuscitation - Allow a Natural Death  b.) Goals of Treatment:   ii. Selective Treatment: Use medical treatment, antibiotics, IV fluids and cardiac monitor as indicated. No intubation, advanced airway interventions, or mechanical ventilation. May consider less invasive airway support (e.g. CPAP, BiPAP). Transfer to hospital if indicated. Generally avoid the intensive care unit. All patients will receive comfort-focused treatments.  TREATMENT PLAN: Provide basic medical treatments aimed at treating new or reversible illness.  c.) Interventions and Treatments:  i.  Artificially Administered Nutrition and Hydration: - Long term artificial nutrion/hydration by tube through (not specified) (specify mouth/nose) and (not specified) (specify if ok with directly into GI tract)  ii.  Antibiotics: - Oral antibiotics only (NO IV/IM)       Acute respiratory failure with hypoxia (H) 12/04/2020     Priority: Medium     Gastrojejunostomy tube status (H) 11/11/2020     Priority: Medium     Cognitive disorder 11/11/2020     Priority: Medium     Aggression 11/09/2020     Priority: Medium     Other symptoms and signs involving appearance and behavior 11/09/2020     Priority: Medium     Agitation 09/21/2020     Priority: Medium     G tube feedings (H) 09/03/2020     Priority: Medium     Aphasia 08/05/2020     Priority: Medium     Dysphagia 08/04/2020     Priority: Medium     Non-ST elevation (NSTEMI) myocardial infarction (H)  07/31/2020     Priority: Medium     Cardiac arrest due to other underlying condition (H) 07/31/2020     Priority: Medium     Encephalopathy, unspecified 07/31/2020     Priority: Medium     Dysphagia, unspecified 07/31/2020     Priority: Medium     Diffuse traumatic brain injury with loss of consciousness of unspecified duration, subsequent encounter 07/31/2020     Priority: Medium     Hypoxic ischemic encephalopathy (HIE), unspecified 07/31/2020     Priority: Medium     Traumatic brain injury 06/25/2020     Priority: Medium     Cardiac arrest (H) 05/17/2020     Priority: Medium     Back injury, sequela 10/27/2017     Priority: Medium     Formatting of this note might be different from the original.  WC Case w/ [employer] (www.accidentfund.com)  CLM #[id number];  Lydia Machuca [phone]         Assessment:    Scot Bishop is becoming more nonverbal.  He does become responsive to food cubes.  Valproic acid was discontinued today as we continue to strive towards as little mood medications as possible.  Scto is reported to have extremely mild episodes of irritability.  He will continue with lorazepam twice daily at this point and paroxetine at 30 mg daily.  Hydroxyzine has been discontinued as he has not been taking this prior to medical appointments as he does not appear to need it.  I talked with the parents about introducing music in the background for Scot if he becomes responsive to it in a positive manner..    Medication side effects and alternatives were reviewed. Health promotion activities recommended and reviewed today. All questions addressed. Education and counseling completed regarding risks and benefits of medications and psychotherapy options.    Treatment Plan:        1.  Valproic acid discontinued    2.  Lorazepam 0.5 mg twice daily    3.  Paroxetine 30 mg daily    4.  Hydroxyzine discontinued-not taking        Continue all other medications as reviewed per electronic  medical record today.     Safety plan reviewed. To the Emergency Department as needed or call after hours crisis line at [phone] or [phone]. [place] Crisis Text Line. Text [place] to 091860 or Suicide LifeLine Chat: suicideBantam Live.org/chat/    To schedule individual or family therapy, call [facility] at [phone]    Schedule an appointment with me in 2 months or sooner as needed. Call [facility] at [phone] to schedule.    Follow up with primary care provider as planned or for acute medical concerns.    Call the psychiatric nurse line with medication questions or concerns at [phone]    MyChart may be used to communicate with your provider, but this is not intended to be used for emergencies.    Crisis Resources:    National Suicide Prevention Lifeline: 603.860.1436 (TTY: 895.250.4359). Call anytime for help.  (www.suicidepreventionlifeline.org)  National [place] on Mental Illness (www.melvin.org): 795.236.1675 or 407-082-3312.   Mental Health Association (www.mentalhealth.org): 165.231.3696 or 633-598-0527.  [place] Crisis Text Line: Text [place] to 379584  Suicide LifeLine Chat: suicideBantam Live.org/chat    Administrative Billing:   Time spent with patient includes counseling and coordination of care regarding above diagnoses and treatment plan.    Patient Status:  Patient will continue to be seen for ongoing consultation and stabilization.    Signed:   LASHAWN Jeong-BC   Psychiatry

## 2023-02-03 ENCOUNTER — TELEPHONE (OUTPATIENT)
Dept: PSYCHIATRY | Facility: CLINIC | Age: 43
End: 2023-02-03

## 2023-02-03 NOTE — TELEPHONE ENCOUNTER
Roxanna with [facility] called for clarification on a cancellation order they received.  Please call [phone]

## 2023-02-07 NOTE — TELEPHONE ENCOUNTER
Reason for call:  Other   Patient called regarding (reason for call): call back and prescription  Additional comments:   [facility] #: [phone]  clarification on med being discontinued so that they can get the list to the group home.     Phone number to reach patient: [facility] #: [phone]    Best Time:  ASAP    Can we leave a detailed message on this number?  YES    Travel screening: Not Applicable

## 2023-02-07 NOTE — TELEPHONE ENCOUNTER
1) RN reviewed Telephone message: Reason for call:  Other Patient called regarding (reason for call): call back and prescription  Additional comments:   [facility] #: [phone]  clarification on med being discontinued so that they can get the list to the group home. Phone number to reach patient: [facility] #: [phone]  Best Time:  ASAP  Can we leave a detailed message on this number?  YES.    2) RN spoke with Roxanna who requests clarification on whether or not provider has completely discontinued all forms/strengths of Valproate Sodium (VALPROIC ACID).     3) RN sent message to provider requesting clarification.     4) Will wait for provider's response.     Roxanna Rangel RN on 2/7/2023 at 12:50 PM

## 2023-02-07 NOTE — TELEPHONE ENCOUNTER
"    1) RN reviewed provider message: \"Depakote has been discontinued.\"    2) RN spoke with Roxanna at [facility] at [phone] and relayed the above information. Roxanna states that she will let pt's Group Home know.     3) No further action needed at this time.     Roxanna Rangel RN on 2/7/2023 at 1:38 PM    "

## 2023-02-07 NOTE — PATIENT INSTRUCTIONS
1.  Valproic acid discontinued  2.  Lorazepam 0.5 mg twice daily  3.  Paroxetine 30 mg daily  4.  Hydroxyzine discontinued-not taking    Continue all other medications as reviewed per electronic medical record today.   Safety plan reviewed. To the Emergency Department as needed or call after hours crisis line at [phone] or [phone]. [place] Crisis Text Line. Text [place] to 221359 or Suicide LifeLine Chat: suicideCharmcastle Entertainment Ltd..org/chat/  To schedule individual or family therapy, call [facility] at [phone]  Schedule an appointment with me in 2 months or sooner as needed. Call [facility] at [phone] to schedule.  Follow up with primary care provider as planned or for acute medical concerns.  Call the psychiatric nurse line with medication questions or concerns at [phone]  MyChart may be used to communicate with your provider, but this is not intended to be used for emergencies.    Crisis Resources:    National Suicide Prevention Lifeline: 886.761.1814 (TTY: 105.104.1993). Call anytime for help.  (www.suicidepreventionlifeline.org)  National [place] on Mental Illness (www.melvin.org): 926.419.8381 or 572-008-1400.   Mental Health Association (www.mentalhealth.org): 470.197.2835 or 837-797-0365.  [place] Crisis Text Line: Text [place] to 726928  Suicide LifeLine Chat: suicideCharmcastle Entertainment Ltd..org/chat

## 2023-02-17 DIAGNOSIS — I50.21 ACUTE SYSTOLIC HEART FAILURE (H): ICD-10-CM

## 2023-02-20 DIAGNOSIS — I50.21 ACUTE SYSTOLIC HEART FAILURE (H): ICD-10-CM

## 2023-02-20 RX ORDER — SODIUM CHLORIDE 1 G/1
TABLET ORAL
Qty: 30 TABLET | Refills: 3 | OUTPATIENT
Start: 2023-02-20

## 2023-02-20 RX ORDER — SODIUM CHLORIDE 1 G/1
1 TABLET ORAL DAILY PRN
Qty: 60 TABLET | Refills: 0 | Status: SHIPPED | OUTPATIENT
Start: 2023-02-20 | End: 2023-05-09

## 2023-02-24 ENCOUNTER — PATIENT OUTREACH (OUTPATIENT)
Dept: NURSING | Facility: CLINIC | Age: 43
End: 2023-02-24

## 2023-02-24 NOTE — PROGRESS NOTES
"Clinic Care Coordination Contact    Follow Up Progress Note      Assessment:   Gerald, father and legal guardian, states there's \"nothing new\" with patient.  They are still waiting for the EZ-stand and patient continues to attend outpatient Physical Therapy at [facility]. Patient continues to take fluids via syringe and has G tube.    Care Gaps:    Health Maintenance Due   Topic Date Due     HEPATITIS B IMMUNIZATION (1 of 3 - 3-dose series) Never done     Pneumococcal Vaccine: Pediatrics (0 to 5 Years) and At-Risk Patients (6 to 64 Years) (2 - PCV) 10/13/2021     Care Gaps Last addressed on 2/24/2023    Care Plans  Care Plan: Physical Therapy     Problem: Lifestyle choices     Goal: Functional     Start Date: 8/20/2021 Expected End Date: 4/20/2023    This Visit's Progress: 80% Recent Progress: 90%    Note:     Goal Statement: Patient will continue Physical Therapy/Outpatient Therapy.  Barriers: Deconditioned   Strengths:Supportive parents   Patient/caregiver expressed understanding of goal: Yes  Action steps to achieve this goal:  1. I/caregiver will keep future appointments  2. I/caregiver will discuss, review, schedule and complete recommended overdue health maintenance with my primary care provider  3. I/caregiver will contact my care team with questions, concerns, support needs   4. I/caregiver will use the clinic as a resource, and I understand I can contact my clinic with 24/7 after hours services available                       Intervention/Education provided during outreach:   [email or web address]    Plan:   -Patient will contact the care team with questions, concerns, support needs   -Patient will use the clinic as a resource and understands (s)he can contact the [place] clinic with 24/7 after hours services available  -Care Coordinator will remain available as needed  -RNCC will follow up in one month for follow up appointments/recommendations and goal progression     Candace Perez RN, BSN, PHN  Primary Care / " Care Coordinator   [facility]  E-mail [email or web address]   [phone]

## 2023-03-13 NOTE — LETTER
Letter by Fouzia High CNP at      Author: Fouzia High CNP Service: -- Author Type: --    Filed:  Encounter Date: 2020 Status: (Other)         Patient: Scot Bishop   MR Number: [id number]   YOB: 1980   Date of Visit: 2020       [facility]      NAME:  Scot Bishop             :  1980    MRN: [id number]    CODE STATUS: DNR    FACILITY: [facility] [[id number]]       CHIEF COMPLAIN/REASON FOR VISIT:  Chief Complaint   Patient presents with   ? Problem Visit     SEDATION       HISTORY OF PRESENT ILLNESS: Scot Bishop is a 40 y.o. male being seen per today for review of multiple medical conditions. He is back from the hospital after an extensive 3-week stay, he had aggressive behaviors here on the TCU and was sent out and was followed by psychiatric care at [facility]. We have been monitoring his LOC and has been sleepy, has TBI due to hypoxia.   He is a TBI and seen in his room this a.m. nonverbal he has a history of aphasia very calm poor eye contact.  [place] [facility] [place]. At that juncture he was in cardiac cath lab and suffered a hypoxic brain injury, high temp and thrombotic event. He is seen in his room today. Non verbal and did not track with his eyes. He will make eye contact. H. He will need ongoing rehab services with PT/OT/ST. Has Gtube due to his dysphagia,  Unfortunealy due to Scot Hypoxic brain injury he will not be able to understand hygiene education and nursing will need to meet these needs for pt. Therapy continues to work with him, seen ambualting with SBA two.Staff to anticipate needs and provide good elizabeth care two times a day and prn.   continues to work with family towards DC to a group home after TCU stay as patient will be unable to care for self, remains dependant on staff for all ADL and mobility as well as GTube for feedings, he also has an order  for puréed diet level 4 with thick nectar liquids.  However at this juncture we will look at the tube feeding this is more pleasure feedings and if he gets aggressive he could aspirate. Scot is very awake today, has had decrease in his Depakote recently reduced and he is much more awake  Today with good eye contact. I have reviewed Scot case with other rounding physician as we considered decrease in psycotropics but at this juncture will not make changes.      . No Known Allergies:     Current Outpatient Medications   Medication Sig   ? acetaminophen (TYLENOL) 650 mg/20.3 mL Soln 650 mg every 6 (six) hours as needed. Via PEG-tube    ? amantadine HCL (SYMMETREL) 50 mg/5 mL solution 100 mg every 12 (twelve) hours. Via PEG-tube   ? aspirin 81 MG EC tablet 81 mg daily. Via PEG-tube   ? atorvastatin (LIPITOR) 40 MG tablet 40 mg at bedtime. Via PEG-tube   ? haloperidoL (HALDOL) 5 MG tablet 5 mg by G-tube route 3 (three) times a day.   ? lisinopriL (PRINIVIL,ZESTRIL) 2.5 MG tablet 1.25 mg daily. Via PEG-tube, hold if SBP <90   ? LORazepam (ATIVAN) 0.5 MG tablet 1 tablet (0.5 mg total) by G-tube route 4 (four) times a day for 3 days.   ? metoprolol tartrate (LOPRESSOR) 25 MG tablet 6.25 mg 2 (two) times a day. Via PEG-tube, hold if SBP <90, HR <60   ? neomycin-bacitracin-polymyxin B (NEOSPORIN) 3.5-400-5,000 mg-unit-unit OiPk ointment Apply 1 application topically 2 (two) times a day.   ? omeprazole (PRILOSEC) 2 mg/mL SusR suspension 40 mg daily before breakfast. Via PEG-tube   ? PARoxetine (PAXIL) 20 MG tablet 20 mg by G-tube route every morning.   ? QUEtiapine (SEROQUEL) 25 MG tablet 50 mg 3 (three) times a day. Via PEG-tube give 25 mg am/pm with 12.5 mg mid day due to increased agitation   ? ticagrelor (BRILINTA) 90 mg Tab 90 mg 2 (two) times a day. Via PEG-tube   ? traZODone (DESYREL) 50 MG tablet 50 mg at bedtime. Via PEG-tube   ? white petrolatum (AQUAPHOR ORIGINAL) 41 % Oint Apply 1 application topically 2 (two)  times a day. Apply to feet/legs         REVIEW OF SYSTEMS:  Unable to verbalize due to aphasia    PHYSICAL EXAMINATION:  Vitals:    12/16/20 1517   BP: 106/73   Pulse: (!) 114   Temp: 98.3  F (36.8  C)   Weight: 159 lb 9.6 oz (72.4 kg)         GENERAL: Does not follow simple commands, makes eye contact, non verbal.  HEENT: Head is normocephalic with normal hair distribution. No evidence of trauma.Oral: reddened wth white coating on tongue left inner cheek Ears: No acute purulent discharge. Eyes: Conjunctivae pink with no scleral jaundice. Nose: Normal mucosa and septum. NECK: Supple with no cervical or supraclavicular lymphadenopathy. Trachea is midline, healing trach stomaLungs : He is CTA  EXTREMITIES: Atraumatic. Full range of motion on both upper and lower extremities, there is no tenderness to palpation, no pedal edema, no cyanosis or clubbing, no calf tenderness, normal cap refill, no joint swelling.  SKIN: Warm and dry, no erythema noted, abdomen with g tube  NEUROLOGICAL: The patient is oriented to person, TBI    Social Distancing with PPE practiced due to Covid 19    LABS:    Lab Results   Component Value Date    WBC 10.6 12/08/2020    HGB 13.7 (L) 12/08/2020    HCT 43.8 12/08/2020    MCV 94 12/08/2020     12/08/2020       Results for orders placed or performed in visit on 12/08/20   Basic Metabolic Panel   Result Value Ref Range    Sodium 149 (H) 136 - 145 mmol/L    Potassium 3.8 3.5 - 5.0 mmol/L    Chloride 112 (H) 98 - 107 mmol/L    CO2 25 22 - 31 mmol/L    Anion Gap, Calculation 12 5 - 18 mmol/L    Glucose 137 (H) 70 - 125 mg/dL    Calcium 8.8 8.5 - 10.5 mg/dL    BUN 23 (H) 8 - 22 mg/dL    Creatinine 0.74 0.70 - 1.30 mg/dL    GFR MDRD Af Amer >60 >60 mL/min/1.73m2    GFR MDRD Non Af Amer >60 >60 mL/min/1.73m2           No results found for: HGBA1C  No results found for: YOFDBWIQ94EY  No results found for: LDAZARIP90    ASSESSMENT/PLAN:  1. Hypoxic brain injury (H)    2. Sedated        1.. Hypoxic  Brain injury: Scot is in a semi vegative state, med changes made in acute care and  is less aggressive now. Total dependance on staff for all ADL and med/tube management.  Pallative consult with [facility] in place. Rounding MD did decrease his Depakote but remains calm and minimally responsive.    2. Sedation: More awake today, due to TBI no real communication, has aphasia and dysphagia. However recent Depakote level and he has had several behaviors and now is less sedated w/o reported behaviors.     Electronically signed by:  Fouzia High CNP  This progress note was completed using Dragon software and there may be grammatical errors.                    152.4

## 2023-03-14 ENCOUNTER — HOSPITAL ENCOUNTER (OUTPATIENT)
Dept: RADIOLOGY | Facility: HOSPITAL | Age: 43
Discharge: HOME | End: 2023-03-14
Attending: PHYSICIAN ASSISTANT
Payer: COMMERCIAL

## 2023-03-14 PROCEDURE — 49450 REPLACE G/C TUBE PERC: CPT

## 2023-03-23 DIAGNOSIS — I95.9 HYPOTENSION, UNSPECIFIED HYPOTENSION TYPE: ICD-10-CM

## 2023-03-23 RX ORDER — MIDODRINE HYDROCHLORIDE 2.5 MG/1
TABLET ORAL
Qty: 84 TABLET | Refills: 0 | OUTPATIENT
Start: 2023-03-23

## 2023-03-23 RX ORDER — MIDODRINE HYDROCHLORIDE 2.5 MG/1
TABLET ORAL
Qty: 270 TABLET | Refills: 0 | Status: SHIPPED | OUTPATIENT
Start: 2023-03-23 | End: 2023-05-09

## 2023-03-30 ENCOUNTER — PATIENT OUTREACH (OUTPATIENT)
Dept: NURSING | Facility: CLINIC | Age: 43
End: 2023-03-30

## 2023-03-30 NOTE — PROGRESS NOTES
Clinic Care Coordination Contact    Follow Up Progress Note      Assessment:   Gerald, legal guardian and patients father, reports everything is the same except, the [facility] has the EZ stand yet staff need to instructed how to use.  Gerald states an assigned group home member would go with the patient to his outpatient Physical Therapy appointments and was shown/instructed on how to use the EZ stand and then was to instruct the group home staff.  However, the company that owns the group home are not happy with this idea and require a professional to instruct the staff on it's use.    Gerald asked RNCC for ideas and suggested calling the  of the EZ stand (logo and manufacture on the EZ stand) to see if there is a local representative that would be willing to go to the group home and instruct or ask if they have a video on line that staff could review.  Gerald like the idea and will look into.    Care Gaps:    Health Maintenance Due   Topic Date Due     HEPATITIS B IMMUNIZATION (1 of 3 - 3-dose series) Never done     Pneumococcal Vaccine: Pediatrics (0 to 5 Years) and At-Risk Patients (6 to 64 Years) (2 - PCV) 10/13/2021     Care Gaps Last addressed on 3/30/2023    Care Plans  Care Plan: Physical Therapy     Problem: Lifestyle choices     Goal: Functional     Start Date: 8/20/2021 Expected End Date: 4/20/2023    This Visit's Progress: 80% Recent Progress: 80%    Note:     Goal Statement: Patient will continue Physical Therapy/Outpatient Therapy.  Barriers: Deconditioned   Strengths:Supportive parents   Patient/caregiver expressed understanding of goal: Yes  Action steps to achieve this goal:  1. I/caregiver will keep future appointments  2. I/caregiver will discuss, review, schedule and complete recommended overdue health maintenance with my primary care provider  3. I/caregiver will contact my care team with questions, concerns, support needs   4. I/caregiver will use the clinic as a  resource, and I understand I can contact my clinic with 24/7 after hours services available                       Intervention/Education provided during outreach:   RNCC called and spoke with patient/caregiver; introduced self, discussed role of Care Coordination and explained reason for call    Plan:   -Patient will contact the care team with questions, concerns, support needs   -Patient will use the clinic as a resource and understands (s)he can contact the [facility] with 24/7 after hours services available  -Care Coordinator will remain available as needed  -RNCC will follow up in one month for follow up appointments/recommendations and goal progression     Candace Perez RN, BSN, PHN  Primary Care / Care Coordinator   [facility]  E-mail [email or web address]   [phone]

## 2023-04-03 ENCOUNTER — VIRTUAL VISIT (OUTPATIENT)
Dept: PSYCHIATRY | Facility: CLINIC | Age: 43
End: 2023-04-03
Payer: COMMERCIAL

## 2023-04-03 DIAGNOSIS — F34.81 DISRUPTIVE MOOD DYSREGULATION DISORDER (H): Primary | ICD-10-CM

## 2023-04-03 PROCEDURE — 99214 OFFICE O/P EST MOD 30 MIN: CPT | Mod: VID | Performed by: NURSE PRACTITIONER

## 2023-04-03 RX ORDER — LORAZEPAM 0.5 MG/1
0.25 TABLET ORAL
Qty: 30 TABLET | Refills: 3 | Status: SHIPPED | OUTPATIENT
Start: 2023-04-03 | End: 2023-07-20

## 2023-04-03 RX ORDER — PAROXETINE 30 MG/1
30 TABLET, FILM COATED ORAL EVERY MORNING
Qty: 30 TABLET | Refills: 3 | Status: SHIPPED | OUTPATIENT
Start: 2023-04-03 | End: 2023-07-20

## 2023-04-03 RX ORDER — DIVALPROEX SODIUM 125 MG/1
125 TABLET, DELAYED RELEASE ORAL 2 TIMES DAILY
Qty: 60 TABLET | Refills: 11 | Status: SHIPPED | OUTPATIENT
Start: 2023-04-03 | End: 2023-07-20

## 2023-04-03 ASSESSMENT — PATIENT HEALTH QUESTIONNAIRE - PHQ9
SUM OF ALL RESPONSES TO PHQ QUESTIONS 1-9: 3
10. IF YOU CHECKED OFF ANY PROBLEMS, HOW DIFFICULT HAVE THESE PROBLEMS MADE IT FOR YOU TO DO YOUR WORK, TAKE CARE OF THINGS AT HOME, OR GET ALONG WITH OTHER PEOPLE: SOMEWHAT DIFFICULT
SUM OF ALL RESPONSES TO PHQ QUESTIONS 1-9: 3

## 2023-04-03 NOTE — NURSING NOTE
Is the patient currently in the state of [place]? YES - at home.    Visit mode:VIDEO    If the visit is dropped, the patient can be reconnected by: VIDEO VISIT: Text to cell phone:   Telephone Information:   Mobile [phone]       Will anyone else be joining the visit? Yes: How would they like to receive their invite Text to cell phone: [phone]  (If patient encounters technical issues they should call [phone])    How would you like to obtain your AVS? MyChart    Are changes needed to the allergy or medication list? YES Staff reported pt is no longer taking neomycin-bacitracin-polymyxin 3.5-400-5000 oint.    Rooming Documentation: Care team has reviewed attendance agreement with patient. Patient advised that two failed appointments within 6 months may lead to termination of current episode of care.    Pt's father and staff will be present on video together with pt.     Reason for visit:  Follow Up    ELENA Castelan

## 2023-04-03 NOTE — PROGRESS NOTES
"Virtual Visit Details    Type of service:  Video Visit   Video Start Time: 1:03 PM  Video End Time:1:24 PM    Originating Location (pt. Location): Home    Distant Location (provider location):  On-site  Platform used for Video Visit: [facility] Psychiatric Progress Note    Name: Scot Bishop   : 1980                    Primary Care Provider: Cachorro Moralse MD   Therapist: None    PHQ-9 scores:      11/15/2021    12:08 PM 2022    12:57 PM   PHQ-9 SCORE   PHQ-9 Total Score MyChart 4 (Minimal depression) 11 (Moderate depression)   PHQ-9 Total Score 4 11       CLEMENT-7 scores:      2022    12:48 PM   CLEMENT-7 SCORE   Total Score 12 (moderate anxiety)   Total Score 12       Patient Identification:    Patient is a 42 year old year old, single  White Not  or  male  who presents for return visit with me.  Patient is currently disabled. Patient attended the session with His father Gerald and care team staff member , who they agreed to have interview with. Patient prefers to be called: \" Scot\".    Current medications include: acetaminophen (TYLENOL) 325 MG tablet, Take 1 tablet (325 mg) by mouth every 6 hours as needed for mild pain or fever  aspirin (ASPIRIN LOW DOSE) 81 MG chewable tablet, TAKE ONE TABLET PER G TUBE EVERY DAY. Appointment required for further refills  atorvastatin (LIPITOR) 40 MG tablet, Take one tablet (40 mg) per G tube every night at bedtime. Appointment required for further refills  bacitracin 500 UNIT/GM external ointment,   docusate sodium (COLACE) 100 MG capsule, Take 1 capsule (100 mg) by mouth 2 times daily as needed for constipation  LORazepam (ATIVAN) 0.5 MG tablet, Take 0.5 tablets (0.25 mg) by mouth 2 times daily  midodrine (PROAMATINE) 2.5 MG tablet, TAKE ONE TABLET BY MOUTH OR BY FEEDING TUBE THREE TIMES DAILY WITH MEALS Strength: 2.5 mg. Appointment required for further refills  mineral oil-hydrophilic petrolatum (AQUAPHOR) external " ointment, APPLY TO FEET AND LEGS TWICE DAILY AS NEEDED  Neomycin-Bacitracin-Polymyxin (SM TRIPLE ANTIBIOTIC ORIGINAL) 3.5-400-5000 OINT, APPLY TO AFFECTED AREA TWICE A DAY AS NEEDED  nystatin (MYCOSTATIN) 769517 UNIT/GM external cream, APPLY TO BUTTOCKS TWICE DAILY AS NEEDED  PARoxetine (PAXIL) 30 MG tablet, Take 1 tablet (30 mg) by mouth every morning  sodium chloride 1 GM tablet, Take 1 tablet (1 g) by mouth daily as needed (for low blood pressure before PT session) Appointment required for further refills  sulfamethoxazole-trimethoprim (BACTRIM DS) 800-160 MG tablet, Take 1 tablet by mouth 2 times daily    No current facility-administered medications on file prior to visit.       The [place] Prescription Monitoring Program has been reviewed and there are no concerns about diversionary activity for controlled substances at this time.      I was able to review most recent Primary Care Provider, specialty provider, and therapy visit notes that I have access to.     Today, staff reports that Scot has become more aggressive since discontinuing the Depakote.  He has been sliding down in his chair frequently, kicking and hitting staff, especially when they attempt to do self cares for him.  He would like to be able to see Scot engage in more activities that can increase his abilities to stand.  Scot remains mute.  He was observed laughing spontaneously, requiring reminders from care team staff member and father to talk softer.  Intermittent eye contact detected today and looking at the screen at me.     has a past medical history of Acute respiratory failure with hypoxia (H), Aggression (11/09/2020), Agitation (09/21/2020), LOWELL (acute kidney injury) (H), Back injury, sequela (10/27/2017), Cardiac arrest (H) (05/17/2020), Cardiac arrest (H), Cognitive disorder (11/11/2020), Depressive disorder, Dysphagia (11/11/2020), Dysphagia, Gastrojejunostomy tube status (H) (11/11/2020), HTN (hypertension), Hypoxic ischemic  encephalopathy, Low blood pressure (09/17/2020), Nonverbal (11/11/2020), NSTEMI (non-ST elevated myocardial infarction) (H), and TBI (traumatic brain injury) (06/25/2020).    Social history updates:    Scot continues to reside at a care facility.  He is incontinent.  Staff are necessary to help him with his ADLs as well as help to feed him.  While he had the feeding tube replaced, he is able to take his medications and eat food by mouth.    Substance use updates:    No alcohol use  Tobacco use: No    Vital Signs:   There were no vitals taken for this visit.    Labs:    Most recent laboratory results reviewed and no new labs.     Mental Status Examination:  Appearance: awake, alert and casual dress  Attitude: evasive  Eye Contact:  looking around room  Gait and Station: Sits in chair or lies in bed and No dizziness or falls  Psychomotor Behavior:  fidgeting  Oriented to:  Person  Attention Span and Concentration:  Easily distracted  Speech:   vtspeech: mute  Mood:  : labile  Affect:  : labile  Associations:  Unable to assess  Thought Process:  linear  Thought Content:  no evidence of suicidal ideation or homicidal ideation  Recent and Remote Memory:  poor Not formally assessed. No amnesia.  Fund of Knowledge: Impaired  Insight:  poor  Judgment: Poor  Impulse Control:  Poor    Suicide Risk Assessment:  Today Scot Bishop has made no attempts to harm themself or others. In addition, there are notable risk factors for self-harm, including anxiety, comorbid medical condition of Severe hypoxic ischemic encephalopathy and mood change. However, risk is mitigated by receiving 24-hour supervised care. Therefore, based on all available evidence including the factors cited above, Scot Bishop does not appear to be at imminent risk for self-harm, does not meet criteria for a 72-hr hold, and therefore remains appropriate for ongoing outpatient level of care.  A thorough assessment of risk factors related to suicide and  self-harm have been reviewed and are noted above. The patient convincingly denies suicidality on several occasions. Local community safety resources printed and reviewed for patient to use if needed. There was no deceit detected, and the patient presented in a manner that was believable.     DSM5 Diagnosis:  296.99 (F34.8) Disruptive Mood Dysregulation Disorder    Medical comorbidities include:   Patient Active Problem List    Diagnosis Date Noted     Hypotension, unspecified hypotension type 01/17/2022     Priority: Medium     CARDIOVASCULAR SCREENING; LDL GOAL LESS THAN 100 11/16/2021     Priority: Medium     Ischemic encephalopathy 09/29/2021     Priority: Medium     Last Assessment & Plan:   Formatting of this note might be different from the original.  Hypoxic brain injury 5/2020.   Largely non-verbal.  Not following commands.   Lip smacking mouth movements.     Plan   - check EEG to evaluate for seizures   - continue therapy evaluations   - prognosis - guarded, history and exam are consistent with severe, permanent brain injury       Nicotine dependence 04/16/2021     Priority: Medium     Formatting of this note might be different from the original.  Created by Conversion       Thrombocytopenia, unspecified (H) 04/16/2021     Priority: Medium     Thrush, oral 12/09/2020     Priority: Medium     Advance care planning 12/04/2020     Priority: Medium     Formatting of this note might be different from the original.  [facility] was asked to see patient by inpatient palliative care on 11/23 for continuation of palliative care in a new setting.  Date of last visit: 12/9/2020    Formatting of this note is different from the original.  Advance Care Planning   Patient has identified Health Care Agent(s): Yes  Add Health Care Agents: Yes    Health Care Agent(s):  Guardian: Gerald Bishop Relationship: father Phone: [phone]     Patient has Advance Care Plan Documents (Health Care Directive, POLST):  Yes    Advance Care Plan Documents:  POLST Form     Patient has identified Specific Treatment Preferences: Yes     How have preferences been verified: POLST    Specific Treatment Preferences:   a.) Code Status:  DNR/ Do Not Attempt Resuscitation - Allow a Natural Death  b.) Goals of Treatment:   ii. Selective Treatment: Use medical treatment, antibiotics, IV fluids and cardiac monitor as indicated. No intubation, advanced airway interventions, or mechanical ventilation. May consider less invasive airway support (e.g. CPAP, BiPAP). Transfer to hospital if indicated. Generally avoid the intensive care unit. All patients will receive comfort-focused treatments.  TREATMENT PLAN: Provide basic medical treatments aimed at treating new or reversible illness.  c.) Interventions and Treatments:  i.  Artificially Administered Nutrition and Hydration: - Long term artificial nutrion/hydration by tube through (not specified) (specify mouth/nose) and (not specified) (specify if ok with directly into GI tract)  ii.  Antibiotics: - Oral antibiotics only (NO IV/IM)       Acute respiratory failure with hypoxia (H) 12/04/2020     Priority: Medium     Gastrojejunostomy tube status (H) 11/11/2020     Priority: Medium     Cognitive disorder 11/11/2020     Priority: Medium     Aggression 11/09/2020     Priority: Medium     Other symptoms and signs involving appearance and behavior 11/09/2020     Priority: Medium     Agitation 09/21/2020     Priority: Medium     G tube feedings (H) 09/03/2020     Priority: Medium     Aphasia 08/05/2020     Priority: Medium     Dysphagia 08/04/2020     Priority: Medium     Non-ST elevation (NSTEMI) myocardial infarction (H) 07/31/2020     Priority: Medium     Cardiac arrest due to other underlying condition (H) 07/31/2020     Priority: Medium     Encephalopathy, unspecified 07/31/2020     Priority: Medium     Dysphagia, unspecified 07/31/2020     Priority: Medium     Diffuse traumatic brain injury with loss  of consciousness of unspecified duration, subsequent encounter 07/31/2020     Priority: Medium     Hypoxic ischemic encephalopathy (HIE), unspecified 07/31/2020     Priority: Medium     Traumatic brain injury (H) 06/25/2020     Priority: Medium     Cardiac arrest (H) 05/17/2020     Priority: Medium     Back injury, sequela 10/27/2017     Priority: Medium     Formatting of this note might be different from the original.  WC Case w/ [employer] ([email or web address])  CLM #[id number];  Lydia Machuca [phone]         Assessment:    Scot Bishop has begun to have an increase in aggressive behaviors.  After a week and a half of not being on the Depakote, he began kicking and hitting staff as well as sliding out of his chair frequently, particularly when staff are attempting to perform ADLs on him.  He has been resistant to standing, feeding himself, and interacting with other people.  Today I added back the Depakote at 125 mg twice daily.  The staff are instructed to contact me in approximately 1 week after being on this medication to determine if future dose adjustments need to be made.  At our next visit in 6 weeks, and determine if it is time to stop the lorazepam, as it is such a low dose that 1 wonders how effective it is in decreasing impulsivity's..    Medication side effects and alternatives were reviewed. Health promotion activities recommended and reviewed today. All questions addressed. Education and counseling completed regarding risks and benefits of medications and psychotherapy options.    Treatment Plan:        1.  Restart Depakote: 125 mg twice daily    2.  Continue paroxetine 30 mg daily    3.  Continue lorazepam 0.  2 5 mg twice daily    4.  Contact me in 1 week to determine additional adjustments in the dosage of Depakote if needed    5.  At our next visit we can talk about discontinuing the lorazepam if symptoms are stable        Continue all other medications as reviewed  per electronic medical record today.     Safety plan reviewed. To the Emergency Department as needed or call after hours crisis line at [phone] or [phone]. [place] Crisis Text [place]. Text [place] to 971908 or Suicide LifeLine Chat: suicideL4 Mobile.org/chat/    To schedule individual or family therapy, call [facility] at [phone]    Schedule an appointment with me in 6 weeks or sooner as needed. Call [facility] at [phone] to schedule.    Follow up with primary care provider as planned or for acute medical concerns.    Call the psychiatric nurse line with medication questions or concerns at [phone]    MyChart may be used to communicate with your provider, but this is not intended to be used for emergencies.    Crisis Resources:    National Suicide Prevention Lifeline: 136.892.3073 (TTY: 564.161.8643). Call anytime for help.  (www.suicidepreventionlifeline.org)  National [place] on Mental Illness (www.melvin.org): 719.763.7549 or 967-473-4200.   Mental Health Association (www.mentalhealth.org): 390.244.9984 or 096-916-8547.  [place] Crisis Text [place]: Text [place] to 703741  Suicide LifeLine Chat: suicideL4 Mobile.org/chat    Administrative Billing:   Time spent with patient includes counseling and coordination of care regarding above diagnoses and treatment plan.    Patient Status:  This is a continuous care patient and medications will be prescribed by the psychiatric provider until further indicated.    Signed:   LASHAWN Jeong-BC   Psychiatry        Answers for HPI/ROS submitted by the patient on 4/3/2023  If you checked off any problems, how difficult have these problems made it for you to do your work, take care of things at home, or get along with other people?: Somewhat difficult  PHQ9 TOTAL SCORE: 3

## 2023-04-03 NOTE — PATIENT INSTRUCTIONS
1.  Restart Depakote: 125 mg twice daily  2.  Continue paroxetine 30 mg daily  3.  Continue lorazepam 0.  2 5 mg twice daily  4.  Contact me in 1 week to determine additional adjustments in the dosage of Depakote if needed  5.  At our next visit we can talk about discontinuing the lorazepam if symptoms are stable    Continue all other medications as reviewed per electronic medical record today.   Safety plan reviewed. To the Emergency Department as needed or call after hours crisis line at [phone] or [phone]. [place] Crisis [place]. Text [place] to 225413 or Suicide LifeLine Chat: suicideEnkata Technologies.org/chat/  To schedule individual or family therapy, call [facility] at [phone]  Schedule an appointment with me in 6 weeks or sooner as needed. Call [facility] at [phone] to schedule.  Follow up with primary care provider as planned or for acute medical concerns.  Call the psychiatric nurse line with medication questions or concerns at [phone]  MyChart may be used to communicate with your provider, but this is not intended to be used for emergencies.    Crisis Resources:    National Suicide Prevention Lifeline: 977.732.9852 (TTY: 680.656.9808). Call anytime for help.  (www.suicidepreventionlifeline.org)  National [place] on Mental Illness (www.melvin.org): 464.455.5833 or 061-194-1414.   Mental Health Association (www.mentalhealth.org): 175.408.5679 or 276-962-2883.  [place] Crisis [place]: Text [place] to 569942  Suicide LifeLine Chat: suicideEnkata Technologies.org/chat      Patient Education   The [facility] Psychiatry Program  What to Expect  Here's what to expect in the Panel Psychiatry Program.   About the program  You'll be meeting with a psychiatric doctor to check your mental health. A psychiatric doctor helps you deal with troubling thoughts and feelings by giving you medicine. They'll make sure you know the plan for your care. You may see them for a  "long time. When you're feeling better, they may refer you back to seeing your family doctor.   If you have any questions, we'll be glad to talk to you.  About visits  Be open  At your visits, please talk openly about your problems. It may feel hard, but it's the best way for us to help you.  Cancelling visits  If you can't come to your visit, please call us right away at [phone]. If you don't cancel at least 24 hours (1 full day) before your visit, that's \"late cancellation.\"  Not showing up for your visits  Being very late is the same as not showing up. You'll be a \"no show\" if:  You're more than 15 minutes late for a 30-minute (half hour) visit.  You're more than 30 minutes late for a 60-minute (full hour) visit.  If you cancel late or don't show up 2 times within 6 months, we may end your care.  Getting help between visits  If you need help between visits, you can call us Monday to Friday from 8 a.m. to 4:30 p.m. at [phone].  Emergency care  Call 911 or go to the nearest emergency department if your life or someone else's life is in danger.  Call 378 anytime to reach the national Suicide and Crisis hotline.  Medicine refills  To refill your medicine, call your pharmacy. You can also call [facility] at [phone], Monday to Friday, 8 a.m. to 4:30 p.m. It can take 1 to 3 business days to get a refill.   Forms, letters, and tests  You may have papers to fill out, like FMLA, short-term disability, and workability. We can help you with these forms at your visits, but you must have an appointment. You may need more than 1 visit for this, to be in an intensive therapy program, or both.  Before we can give you medicine for ADHD, we may refer you to get tested for it or confirm it another way.  We may not be able to give you an emotional support animal letter.  We don't do mental health checks ordered by the court.   We don't do mental health testing, but we can refer you to " get tested.   Thank you for choosing us for your care.  For informational purposes only. Not to replace the advice of your health care provider. Copyright   2022 [facility]. All rights reserved. TeamPatent [id number] - 12/22.

## 2023-04-20 ENCOUNTER — TELEPHONE (OUTPATIENT)
Dept: INTERNAL MEDICINE | Facility: CLINIC | Age: 43
End: 2023-04-20

## 2023-04-20 DIAGNOSIS — S06.9X9S TRAUMATIC BRAIN INJURY WITH LOSS OF CONSCIOUSNESS, SEQUELA (H): Primary | ICD-10-CM

## 2023-04-20 NOTE — TELEPHONE ENCOUNTER
Called and spoke to Elizabeth. Elizabeth states she needs a physical order placed for home care PT. Upon asking Elizabeth where the referral should be sent. Elizabeth states she is going to find out more information and call the clinic back early next week with a fax number.     In the meantime will send back to PCP to approve of the home care referral for PT.    Elizabeth Marie RN

## 2023-04-20 NOTE — TELEPHONE ENCOUNTER
Please note in order to complete and sign the referral for the home care assessment there needs to be more detail as to why or what is being performed or what is needed as the referral cannot be completed until these questions are answered

## 2023-04-20 NOTE — TELEPHONE ENCOUNTER
TBI outpatient clinic         The Home Care/Assisted Living/Nursing Facility is calling regarding an established patient.  Has the patient seen Home Care in the past or is currently residing in Assisted Living or Nursing Facility? Yes.     Elizabeth calling from [facility] requesting the following orders that are within the Home Care, Assisted Living or Nursing Home Eval and Treatment standing order and can be signed as standing order signature required by RN.    Preferred Call Back Number: Elizabeth JUAREZ [phone] Confidential line         Any additional Orders:  Are there any orders requested, not stated above, that are outside of the standing order and must be routed to a licensed practitioner for approval?      Writer has verified [employer] will send fax to have orders signed.    Pt is being seen at  [facility] outpatient TBI clinic where providers are requesting order for home PT. LARRY Johnson states that pt was secured a standing frame, but [facility] is unable to provide education to group home staff on how to use it. Order to have home PT visit group home to educate staff on use of assistive device.    Please advise.    Nimo HARRINGTON RN  [facility] Triage Team

## 2023-04-21 DIAGNOSIS — E78.5 HLD (HYPERLIPIDEMIA): ICD-10-CM

## 2023-04-21 DIAGNOSIS — I46.9 CARDIAC ARREST (H): ICD-10-CM

## 2023-04-21 NOTE — TELEPHONE ENCOUNTER
On Callback please gather information for reasoning of PT Referral and where referral should be sent to.       Patient Contact    Attempt # 1    Was call answered?  No.  Left message on voicemail with information to call me back.

## 2023-04-24 DIAGNOSIS — I46.9 CARDIAC ARREST (H): ICD-10-CM

## 2023-04-24 DIAGNOSIS — E78.5 HLD (HYPERLIPIDEMIA): ICD-10-CM

## 2023-04-24 RX ORDER — ASPIRIN 81 MG/1
TABLET, CHEWABLE ORAL
Qty: 28 TABLET | Refills: 0 | OUTPATIENT
Start: 2023-04-24

## 2023-04-24 RX ORDER — ASPIRIN 81 MG/1
TABLET, CHEWABLE ORAL
Qty: 90 TABLET | Refills: 0 | Status: SHIPPED | OUTPATIENT
Start: 2023-04-24 | End: 2023-07-18

## 2023-04-24 RX ORDER — ATORVASTATIN CALCIUM 40 MG/1
TABLET, FILM COATED ORAL
Qty: 90 TABLET | Refills: 0 | Status: SHIPPED | OUTPATIENT
Start: 2023-04-24 | End: 2023-05-09

## 2023-04-24 RX ORDER — ATORVASTATIN CALCIUM 40 MG/1
TABLET, FILM COATED ORAL
Qty: 28 TABLET | Refills: 0 | OUTPATIENT
Start: 2023-04-24

## 2023-04-24 NOTE — TELEPHONE ENCOUNTER
Elizabeth JUAREZ calling back with requested information.    Reason for home physical therapy:  Patient needs to work on progressive standing tolerance with new standing frame.     Group home staff will need training for standard precautions as well as cardiology recommendation of blood pressure of a minimum of 85/45 to initiative standing and 160/100 throughout the sessions.    Fax order for home PT to [facility]: Fax [phone].  Marguerite Torres RN

## 2023-04-27 ENCOUNTER — PATIENT OUTREACH (OUTPATIENT)
Dept: NURSING | Facility: CLINIC | Age: 43
End: 2023-04-27

## 2023-04-27 NOTE — TELEPHONE ENCOUNTER
Dr. Morales,    Unfortunately homecare was unable to see patient last week, however they are able to see patient this week.  Please sign the re-pended order for homecare, and route back so patient can start homecare tomorrow.

## 2023-05-01 ENCOUNTER — TELEPHONE (OUTPATIENT)
Dept: INTERNAL MEDICINE | Facility: CLINIC | Age: 43
End: 2023-05-01

## 2023-05-01 DIAGNOSIS — S06.9X9S TRAUMATIC BRAIN INJURY WITH LOSS OF CONSCIOUSNESS, SEQUELA (H): Primary | ICD-10-CM

## 2023-05-01 NOTE — TELEPHONE ENCOUNTER
Please note I have placed this referral with the information that was provided to me although it is unclear if this patient requires a face-to-face visit for this referral to be covered as I have not seen the patient since last year.

## 2023-05-01 NOTE — TELEPHONE ENCOUNTER
----- Message from Candace Perez RN sent at 5/1/2023 10:34 AM CDT -----  Regarding: Home Care Referral  Hi,    This patient needs an updated home care referral for [facility] to see patient; the date needs to be change to the referral so home care agency will be in compliance to see the patient with 24-48 hours from referral date.    Thank you for your help!     Candace Perez RN, BSN, PHN  Primary Care / Care Coordinator   [facility]  E-mail [email or web address]   [phone]

## 2023-05-03 ENCOUNTER — TELEPHONE (OUTPATIENT)
Dept: INTERNAL MEDICINE | Facility: CLINIC | Age: 43
End: 2023-05-03

## 2023-05-03 NOTE — TELEPHONE ENCOUNTER
Spoke with Priyanka Care Coordinator with [facility] to schedule patient for OV. RN assisted to schedule patient for 6/8/2023 with PCP.     Routing to PCP for review. Can orders be approved with appt being scheduled or does visit need to take place first?

## 2023-05-03 NOTE — TELEPHONE ENCOUNTER
Spoke with Anaya with [facility]. Anaya is seeking recommendations if another provider would be able to see patient before 5/25/2023. Anaya stated a Technician will be coming by to show staff how to use patient's Standing Lift Device and the Technician will be coming by on 5/25/2023 but an order will be needed prior for PT.     Routing to PCP. Could patient be seen by another provider for this? In previous note a Face to Face visit was requested. Can it be a VV? Thank you.

## 2023-05-03 NOTE — TELEPHONE ENCOUNTER
Anaya home care nurse called back     Needs a F2F visit (video is okay) next week with PCP    Asking that clinic call group home to set up VV with PCP next week     Routing to MAs/TCs to assist with scheduling      Priyanka with [facility] [place] - [phone]     Mary Anne PALMER, Triage RN  [facility]

## 2023-05-03 NOTE — TELEPHONE ENCOUNTER
Spoke with Anaya with [facility] to relay PCP clarification. Anaya stated the PT order would be for a professional to come out and train staff on how to use patient's Standing Lift Device. Anaya stated she is unsure if face to face appt is needed and will reach out to her staff to verify what is needed.     Will postpone encounter. If Anaya has not called back please reach out to verify what is needed for PT order to be placed.

## 2023-05-03 NOTE — TELEPHONE ENCOUNTER
ALYSE Julien with [facility] called requesting approval of PT visits 2 times a month for 1 month.     Triage advised PT pt needs a face to face visit since PCP has not seen him for a year.    Routing message to TC, please help schedule video or in clinic appt to discuss home care services.     Anaya will need a call back with verbal approval of PT orders once appt is scheduled.

## 2023-05-03 NOTE — TELEPHONE ENCOUNTER
Provider Response to 2nd Level Triage Request    I have reviewed the RN documentation. My recommendation is:  Would suggest scheduling patient for appointment when able.

## 2023-05-03 NOTE — TELEPHONE ENCOUNTER
Patient could certainly be seen by another provider depending availability.  It is unclear to me if this type of visit requires a actual face-to-face visit as that would be a question for the patient's insurance and coverage.

## 2023-05-04 ENCOUNTER — MEDICAL CORRESPONDENCE (OUTPATIENT)
Dept: HEALTH INFORMATION MANAGEMENT | Facility: CLINIC | Age: 43
End: 2023-05-04

## 2023-05-09 ENCOUNTER — VIRTUAL VISIT (OUTPATIENT)
Dept: INTERNAL MEDICINE | Facility: CLINIC | Age: 43
End: 2023-05-09
Payer: COMMERCIAL

## 2023-05-09 ENCOUNTER — OFFICE VISIT (OUTPATIENT)
Dept: CARDIOLOGY | Facility: CLINIC | Age: 43
End: 2023-05-09
Payer: COMMERCIAL

## 2023-05-09 VITALS — HEIGHT: 68 IN | WEIGHT: 157 LBS | BODY MASS INDEX: 23.79 KG/M2

## 2023-05-09 DIAGNOSIS — I50.21 ACUTE SYSTOLIC HEART FAILURE (H): ICD-10-CM

## 2023-05-09 DIAGNOSIS — Z93.4 GASTROJEJUNOSTOMY TUBE STATUS (H): ICD-10-CM

## 2023-05-09 DIAGNOSIS — E78.49 OTHER HYPERLIPIDEMIA: Primary | ICD-10-CM

## 2023-05-09 DIAGNOSIS — I67.89 ISCHEMIC ENCEPHALOPATHY: ICD-10-CM

## 2023-05-09 DIAGNOSIS — I95.9 HYPOTENSION, UNSPECIFIED HYPOTENSION TYPE: ICD-10-CM

## 2023-05-09 DIAGNOSIS — S06.2X9D DIFFUSE TRAUMATIC BRAIN INJURY WITH LOSS OF CONSCIOUSNESS OF UNSPECIFIED DURATION, SUBSEQUENT ENCOUNTER: Primary | ICD-10-CM

## 2023-05-09 PROCEDURE — 99214 OFFICE O/P EST MOD 30 MIN: CPT | Mod: GT | Performed by: INTERNAL MEDICINE

## 2023-05-09 PROCEDURE — 99213 OFFICE O/P EST LOW 20 MIN: CPT | Performed by: PHYSICIAN ASSISTANT

## 2023-05-09 RX ORDER — MIDODRINE HYDROCHLORIDE 2.5 MG/1
TABLET ORAL
Qty: 270 TABLET | Refills: 3 | Status: SHIPPED | OUTPATIENT
Start: 2023-05-09 | End: 2024-03-26

## 2023-05-09 RX ORDER — ATORVASTATIN CALCIUM 40 MG/1
TABLET, FILM COATED ORAL
Qty: 90 TABLET | Refills: 3 | Status: SHIPPED | OUTPATIENT
Start: 2023-05-09 | End: 2024-04-03

## 2023-05-09 RX ORDER — SODIUM CHLORIDE 1 G/1
1 TABLET ORAL DAILY PRN
Qty: 60 TABLET | Refills: 1 | Status: SHIPPED | OUTPATIENT
Start: 2023-05-09 | End: 2023-09-12

## 2023-05-09 NOTE — PROGRESS NOTES
Cardiology Progress Note    Patient seen today in follow up of: Annual follow-up  Primary cardiologist: Dr. 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    He was hospitalized initially on 5/17/2020 with a cardiac arrest.  He had developed chest discomfort the day before per the notes.  He been using Drano on his pipes at home and did not initially seek medical attention for his symptoms since his head on the box that Drano could cause those symptoms.  He subsequently had a syncopal episode after coming out of the shower and EMS was called.  He was asystolic when they arrived.  He was ultimately resuscitated with ROSC.  He had recurrent arrest on arrival to the Cath Lab.  He was promptly placed on ECMO.  Coronary angiogram showed a thrombotic occlusion of the proximal LAD.  He underwent successful aspiration thrombectomy and PCI with drug-eluting stents of the proximal and mid LAD.  His ejection fraction has remained in the 35% range since that time.  He required trach and PEG placement and was discharged on 6/25/2020.  He was then hospitalized in November 2020 at [facility] with increased agitation.  Medication adjustments were made.    He was last seen by Dr. Marcus in February 2022.  His blood pressure had been low and even started on midodrine 3 times a day.  Lisinopril and metoprolol were stopped due to hypotension.  There was concern about him participating in physical therapy and cardiology clearance was requested given his low blood pressures.  She recommended continuing with physical therapy and having him take a  salt tab if needed for hypotension.    He returns today for annual follow-up.  He presents with his dad.  He is living in a group home and his medications were managed there.  We did speak with someone from his group home to verify his medications.  He is continued on the midodrine and has not been needing salt tablets.  His dad reports his blood pressure still does run low at times.  He has not appeared short of breath and does not have peripheral edema. They are working on getting him an Easy Stand at his group home.     ASSESSMENT/PLAN:  Scot Bsihop is a very pleasant 42 year old male with a complex past medical history as noted above.  He had a cardiac arrest in May 2020 after developing chest pain the day prior.  Coronary angiogram showed thrombotic occlusion of the proximal LAD which was treated with a drug-eluting stent.  He has an ischemic cardiomyopathy with an ejection fraction in the 30 to 35% range.  He is wheelchair bound and non-verbal.     His last echocardiogram was in January 2022.  He has not had an ICD in place.  ICD therapy has been discussed with him previously.  He is been a DNR and thus this has not seemed aligns with his goals of care.  He has not tolerated metoprolol or lisinopril due to hypotension and in fact has required midodrine for blood pressure support.  On exam today, no lower extremity edema and he is well perfused.    For his coronary disease, will continue aspirin and high intensity statin with lipitor 40 mg daily.  Last lipid panel showed good control with an LDL of 48.  This was in November 2022.  He is continued on midodrine for issues with hypotension.  We were unable to get a blood pressure on him today.  I refilled his prescriptions today.    It was a pleasure meeting Scot and his dad in clinic today.  I made no changes to his medications.  He can return to see Dr. Marcus in 1 year.  As always, I asked him to contact us sooner if he has any questions or  concerns.    Orders this Visit:  Orders Placed This Encounter   Procedures     Follow-Up with Cardiology     Orders Placed This Encounter   Medications     midodrine (PROAMATINE) 2.5 MG tablet     Sig: TAKE ONE TABLET BY MOUTH OR BY FEEDING TUBE THREE TIMES DAILY WITH MEALS Strength: 2.5 mg. Appointment required for further refills     Dispense:  270 tablet     Refill:  3     sodium chloride 1 GM tablet     Sig: Take 1 tablet (1 g) by mouth daily as needed (for low blood pressure before PT session) Appointment required for further refills     Dispense:  60 tablet     Refill:  1     atorvastatin (LIPITOR) 40 MG tablet     Sig: Take one tablet (40 mg) per G tube every night at bedtime.     Dispense:  90 tablet     Refill:  3     Medications Discontinued During This Encounter   Medication Reason     sodium chloride 1 GM tablet Reorder (No AVS / No eCancel)     midodrine (PROAMATINE) 2.5 MG tablet Reorder (No AVS / No eCancel)     atorvastatin (LIPITOR) 40 MG tablet Reorder (No AVS / No eCancel)       CURRENT MEDICATIONS:  Current Outpatient Medications   Medication Sig Dispense Refill     acetaminophen (TYLENOL) 325 MG tablet Take 1 tablet (325 mg) by mouth every 6 hours as needed for mild pain or fever 90 tablet 0     aspirin (ASPIRIN LOW DOSE) 81 MG chewable tablet TAKE ONE TABLET PER G TUBE EVERY DAY. 90 tablet 0     atorvastatin (LIPITOR) 40 MG tablet Take one tablet (40 mg) per G tube every night at bedtime. 90 tablet 3     divalproex sodium delayed-release (DEPAKOTE) 125 MG DR tablet Take 1 tablet (125 mg) by mouth 2 times daily 60 tablet 11     docusate sodium (COLACE) 100 MG capsule Take 1 capsule (100 mg) by mouth 2 times daily as needed for constipation 180 capsule 0     LORazepam (ATIVAN) 0.5 MG tablet Take 0.5 tablets (0.25 mg) by mouth 2 times daily 30 tablet 3     midodrine (PROAMATINE) 2.5 MG tablet TAKE ONE TABLET BY MOUTH OR BY FEEDING TUBE THREE TIMES DAILY WITH MEALS Strength: 2.5 mg. Appointment  required for further refills 270 tablet 3     nystatin (MYCOSTATIN) 186439 UNIT/GM external cream APPLY TO BUTTOCKS TWICE DAILY AS NEEDED 15 g 1     PARoxetine (PAXIL) 30 MG tablet Take 1 tablet (30 mg) by mouth every morning 30 tablet 3     sodium chloride 1 GM tablet Take 1 tablet (1 g) by mouth daily as needed (for low blood pressure before PT session) Appointment required for further refills 60 tablet 1     ALLERGIES  No Known Allergies  PAST MEDICAL HISTORY:  Past Medical History:   Diagnosis Date     Acute respiratory failure with hypoxia (H)      Aggression 11/09/2020     Agitation 09/21/2020     LOWELL (acute kidney injury) (H)      Back injury, sequela 10/27/2017     Cardiac arrest (H) 05/17/2020     Cardiac arrest (H)      Cognitive disorder 11/11/2020     Depressive disorder      Dysphagia 11/11/2020     Dysphagia      Gastrojejunostomy tube status (H) 11/11/2020     HTN (hypertension)      Hypoxic ischemic encephalopathy      Low blood pressure 09/17/2020     Nonverbal 11/11/2020     NSTEMI (non-ST elevated myocardial infarction) (H)      TBI (traumatic brain injury) (H) 06/25/2020     PAST SURGICAL HISTORY:  Past Surgical History:   Procedure Laterality Date     APPENDECTOMY       CARDIAC CATHETERIZATION  05/17/2020    Procedure: Coronary Angiogram; Surgeon: Chadd Newby MD; Location: [facility] CARDIAC CATH LAB       CV CORONARY ANGIOGRAM N/A 5/17/2020    Procedure: Coronary Angiogram;  Surgeon: Chadd Newby MD;  Location: [facility] CARDIAC CATH LAB     CV EXTRACORPERAL MEMBRANE OXYGENATION N/A 5/17/2020    Procedure: Extracorporeal Membrance Oxygenation;  Surgeon: Chadd Newby MD;  Location: [facility] CARDIAC CATH LAB     CV INTRA AORTIC BALLOON N/A 5/17/2020    Procedure: Intra-Aortic Balloon Pump Insertion;  Surgeon: Chadd Newby MD;  Location: [facility] CARDIAC CATH LAB     CV PCI STENT DRUG ELUTING N/A 5/17/2020    Procedure: Percutaneous Coronary Intervention Stent Drug  Eluting;  Surgeon: Chadd Newby MD;  Location:  [facility] CATH [facility]     G TUBE REPLACEMENT  2020     G TUBE REPLACEMENT  3/10/2021     G TUBE REPLACEMENT  2021     IR GASTROSTOMY TUBE CHANGE  2020     IR GASTROSTOMY TUBE CHANGE  3/10/2021     IR GASTROSTOMY TUBE CHANGE  2021     IR GASTROSTOMY TUBE PERCUTANEOUS PLCMNT  2020     OTHER SURGICAL HISTORY  2020    CV PCI STENT DRUG ELUTINGProcedure: Percutaneous Coronary Intervention Stent Drug Eluting; Surgeon: Chadd Newby MD; Location:  [facility] C[facility]       OTHER SURGICAL HISTORY  2020    CV INTRA AORTIC BALLON PUMP INSERTION Procedure: Intra-Aortic Balloon Pump Insertion; Surgeon: Chadd Newby MD; Location:  [facility] CARDI[facility] CATH LAB       OTHER SURGICAL HISTORY  2020    REMOVE EXTRACORPOREAL MEMBRANE OXYGENATOR Procedure: ECMO Decannulation at Bedside; Surgeon: Mariano Workman MD; Location:  OR       OTHER SURGICAL HISTORY  2020    CV EXTRACORPOREAL MEMBRANE OXGENATIONProcedure: Extracorporeal Membrance Oxygenation; Surgeon: Chadd Newby MD; Location:  [facility] CARDIAC CATH LAB       PEG TUBE PLACEMENT  2020     REMOVE EXTRACORPORAL MEMBRANE OXYGENATOR ADULT (OUTSIDE OR) N/A 2020    Procedure: ECMO Decannulation at Bedside;  Surgeon: Mariano Workman MD;  Location:  OR     [facility] APPENDECTOMY      Description: Appendectomy;  Recorded: 2014;  Comments: age 11 or 12     SOCIAL HISTORY:  Social History     Socioeconomic History     Marital status: Single     Spouse name: None     Number of children: None     Years of education: None     Highest education level: None   Tobacco Use     Smoking status: Former     Packs/day: 0.00     Years: 0.00     Pack years: 0.00     Types: Cigarettes     Start date: 1995     Quit date: 2020     Years since quittin.9     Smokeless tobacco: Never   Vaping Use     Vaping status: Never Used   Substance and  "Sexual Activity     Alcohol use: Not Currently     Drug use: Not Currently     Sexual activity: Not Currently     Social Determinants of Health     Financial Resource Strain: Low Risk  (8/20/2021)    Overall Financial Resource Strain (CARDIA)      Difficulty of Paying Living Expenses: Not hard at all   Food Insecurity: No Food Insecurity (8/20/2021)    Hunger Vital Sign      Worried About Running Out of Food in the Last Year: Never true      Ran Out of Food in the Last Year: Never true   Transportation Needs: No Transportation Needs (8/20/2021)    PRAPARE - Transportation      Lack of Transportation (Medical): No      Lack of Transportation (Non-Medical): No   Physical Activity: Inactive (8/20/2021)    Exercise Vital Sign      Days of Exercise per Week: 0 days      Minutes of Exercise per Session: 0 min   Social Connections: Unknown (8/20/2021)    Social Connection and Isolation Panel [NHANES]      Frequency of Communication with Friends and Family: Never      Frequency of Social Gatherings with Friends and Family: Three times a week      Active Member of Clubs or Organizations: No      Attends Club or Organization Meetings: Never     Review of Systems:  Skin:        Eyes:       ENT:       Respiratory:  Negative    Cardiovascular:  Negative for;palpitations;chest pain;edema;fatigue;lightheadedness;dizziness;Negative    Gastroenterology:      Genitourinary:       Musculoskeletal:       Neurologic:       Psychiatric:       Heme/Lymph/Imm:       Endocrine:          Physical Exam:  Vitals: Ht 1.727 m (5' 8\")   Wt 71.2 kg (157 lb)   BMI 23.87 kg/m     Wt Readings from Last 4 Encounters:   05/09/23 71.2 kg (157 lb)   11/17/22 67.1 kg (148 lb)   07/22/22 81.6 kg (180 lb)   04/13/22 74.6 kg (164 lb 6.4 oz)     GEN: wheelchair bound, non-verbal  C/V:  Regular rate and rhythm  RESP: Respirations are unlabored.  EXTREM: no LE edema.    Emelina Gama PA-C  [facility]  "

## 2023-05-09 NOTE — PROGRESS NOTES
Scot is a 42 year old who is being evaluated via a billable video visit.      How would you like to obtain your AVS? MyChart  If the video visit is dropped, the invitation should be resent by: [email or web address]  Will anyone else be joining your video visit? No    Assessment & Plan     Diffuse traumatic brain injury with loss of consciousness of unspecified duration, subsequent encounter  Appears as baseline.  Face-to-face encounter done.  Home health care and physical therapy referrals have been placed.  Continuing with supportive care.  - CBC with platelets; Future    Gastrojejunostomy tube status (H)  Continuing tube status as patient needs frequent free water flushes to maintain hydration     See Patient Instructions    Cachorro Morales MD  [facility]    See 5/1/2023 clinic note for which home care referral orders have been placed in setting of face-to-face visit today.    Subjective :    Scot is a 42 year old, presenting for the following health issues:  Orders (Face to face visit for home PT orders )    [facility] to requesting home PT referral for a professional to come out and train staff on how to use patient's Standing Lift Device. Face to face appt required by insurance.      See 5/1/2023 clinic note for which home care referral orders have been placed in setting of face-to-face visit today.    History of Present Illness       Reason for visit:  [facility] requested the meeting.  Regarding Scot and his upcoming training on the standing frame.    He eats 2-3 servings of fruits and vegetables daily.He consumes 0 sweetened beverage(s) daily.He exercises with enough effort to increase his heart rate 9 or less minutes per day.  He exercises with enough effort to increase his heart rate 3 or less days per week.   He is taking medications regularly.       Review of Systems   CONSTITUTIONAL: NEGATIVE for fever, chills, no change in weight  EYES: NEGATIVE for  vision changes or irritation  ENT/MOUTH: NEGATIVE for ear, mouth and throat problems  RESP: NEGATIVE for significant cough or SOB  CV: NEGATIVE for chest pain, palpitations  GI: NEGATIVE for nausea, abdominal pain, heartburn, or change in bowel habits  : NEGATIVE for frequency, dysuria, or hematuria  HEME: NEGATIVE for bleeding problems      Objective         Vitals:  No vitals were obtained today due to virtual visit.    Physical Exam   GENERAL: no distress  EYES: Eyes grossly normal to inspection.  No discharge or erythema, or obvious scleral/conjunctival abnormalities.  RESP: No audible wheeze, cough, or visible cyanosis.  No visible retractions or increased work of breathing.    SKIN: Visible skin clear. No significant rash, abnormal pigmentation or lesions.  NEURO: Patient is wheelchair-bound.  Noted prior TBI.  No distinct changes per baseline.  PSYCH: Patient appears baseline.  Per his mental health assessment he has had limited or decreased anger outbursts or combative behaviors.  He is responsive to food cues.  His father continues to be interested in ensuring that he takes as little medication as possible for mental health issues.        Video-Visit Details    Type of service:  Video Visit   Video Start Time:  327PM  Video End Time: 345PM    Originating Location (pt. Location): Home  Distant Location (provider location):  On-site  Platform used for Video Visit: Compufirst

## 2023-05-09 NOTE — PATIENT INSTRUCTIONS
"Thank you for your [facility] visit today. Your provider has recommended the following:    Medication Changes:  No medication changes. I refilled prescriptions today for the year.  Recommendations:  See Dr. Marcus in a year. Call us sooner with heart questions or concerns.   Reminder:  Please bring in all current medications, over the counter supplements and vitamin bottles to your next appointment.  Important \"[facility]\" telephone numbers for your reference:  Cardiology Scheduling - [phone]  Cardiology Clinic RN-  [phone]     [facility]    "

## 2023-05-09 NOTE — LETTER
5/9/2023    Cachorro Morales MD  [address] [facility] [address]    RE: Scot Bishop       Dear Colleague,     I had the pleasure of seeing Scot Bishop in the [facility].    Cardiology Progress Note    Patient seen today in follow up of: Annual follow-up  Primary cardiologist: Dr. 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    He was hospitalized initially on 5/17/2020 with a cardiac arrest.  He had developed chest discomfort the day before per the notes.  He been using Drano on his pipes at home and did not initially seek medical attention for his symptoms since his head on the box that Drano could cause those symptoms.  He subsequently had a syncopal episode after coming out of the shower and EMS was called.  He was asystolic when they arrived.  He was ultimately resuscitated with ROSC.  He had recurrent arrest on arrival to the Cath Lab.  He was promptly placed on ECMO.  Coronary angiogram showed a thrombotic occlusion of the proximal LAD.  He underwent successful aspiration thrombectomy and PCI with drug-eluting stents of the proximal and mid LAD.  His ejection fraction has remained in the 35% range since that time.  He required trach and PEG placement and was discharged on 6/25/2020.  He was then hospitalized in November 2020 at [facility] with increased agitation.  Medication adjustments were made.    He was last seen by Dr. Marcus in February 2022.  His blood pressure had been low and even started on midodrine 3 times a day.  Lisinopril and metoprolol were stopped due to  hypotension.  There was concern about him participating in physical therapy and cardiology clearance was requested given his low blood pressures.  She recommended continuing with physical therapy and having him take a salt tab if needed for hypotension.    He returns today for annual follow-up.  He presents with his dad.  He is living in a group home and his medications were managed there.  We did speak with someone from his group home to verify his medications.  He is continued on the midodrine and has not been needing salt tablets.  His dad reports his blood pressure still does run low at times.  He has not appeared short of breath and does not have peripheral edema. They are working on getting him an Easy Stand at his group home.     ASSESSMENT/PLAN:  Scot Bishop is a very pleasant 42 year old male with a complex past medical history as noted above.  He had a cardiac arrest in May 2020 after developing chest pain the day prior.  Coronary angiogram showed thrombotic occlusion of the proximal LAD which was treated with a drug-eluting stent.  He has an ischemic cardiomyopathy with an ejection fraction in the 30 to 35% range.  He is wheelchair bound and non-verbal.     His last echocardiogram was in January 2022.  He has not had an ICD in place.  ICD therapy has been discussed with him previously.  He is been a DNR and thus this has not seemed aligns with his goals of care.  He has not tolerated metoprolol or lisinopril due to hypotension and in fact has required midodrine for blood pressure support.  On exam today, no lower extremity edema and he is well perfused.    For his coronary disease, will continue aspirin and high intensity statin with lipitor 40 mg daily.  Last lipid panel showed good control with an LDL of 48.  This was in November 2022.  He is continued on midodrine for issues with hypotension.  We were unable to get a blood pressure on him today.  I refilled his prescriptions today.    It was a  pleasure meeting Scot and his dad in clinic today.  I made no changes to his medications.  He can return to see Dr. Marcus in 1 year.  As always, I asked him to contact us sooner if he has any questions or concerns.    Orders this Visit:  Orders Placed This Encounter   Procedures    Follow-Up with Cardiology     Orders Placed This Encounter   Medications    midodrine (PROAMATINE) 2.5 MG tablet     Sig: TAKE ONE TABLET BY MOUTH OR BY FEEDING TUBE THREE TIMES DAILY WITH MEALS Strength: 2.5 mg. Appointment required for further refills     Dispense:  270 tablet     Refill:  3    sodium chloride 1 GM tablet     Sig: Take 1 tablet (1 g) by mouth daily as needed (for low blood pressure before PT session) Appointment required for further refills     Dispense:  60 tablet     Refill:  1    atorvastatin (LIPITOR) 40 MG tablet     Sig: Take one tablet (40 mg) per G tube every night at bedtime.     Dispense:  90 tablet     Refill:  3     Medications Discontinued During This Encounter   Medication Reason    sodium chloride 1 GM tablet Reorder (No AVS / No eCancel)    midodrine (PROAMATINE) 2.5 MG tablet Reorder (No AVS / No eCancel)    atorvastatin (LIPITOR) 40 MG tablet Reorder (No AVS / No eCancel)       CURRENT MEDICATIONS:  Current Outpatient Medications   Medication Sig Dispense Refill    acetaminophen (TYLENOL) 325 MG tablet Take 1 tablet (325 mg) by mouth every 6 hours as needed for mild pain or fever 90 tablet 0    aspirin (ASPIRIN LOW DOSE) 81 MG chewable tablet TAKE ONE TABLET PER G TUBE EVERY DAY. 90 tablet 0    atorvastatin (LIPITOR) 40 MG tablet Take one tablet (40 mg) per G tube every night at bedtime. 90 tablet 3    divalproex sodium delayed-release (DEPAKOTE) 125 MG DR tablet Take 1 tablet (125 mg) by mouth 2 times daily 60 tablet 11    docusate sodium (COLACE) 100 MG capsule Take 1 capsule (100 mg) by mouth 2 times daily as needed for constipation 180 capsule 0    LORazepam (ATIVAN) 0.5 MG tablet Take 0.5  tablets (0.25 mg) by mouth 2 times daily 30 tablet 3    midodrine (PROAMATINE) 2.5 MG tablet TAKE ONE TABLET BY MOUTH OR BY FEEDING TUBE THREE TIMES DAILY WITH MEALS Strength: 2.5 mg. Appointment required for further refills 270 tablet 3    nystatin (MYCOSTATIN) 148804 UNIT/GM external cream APPLY TO BUTTOCKS TWICE DAILY AS NEEDED 15 g 1    PARoxetine (PAXIL) 30 MG tablet Take 1 tablet (30 mg) by mouth every morning 30 tablet 3    sodium chloride 1 GM tablet Take 1 tablet (1 g) by mouth daily as needed (for low blood pressure before PT session) Appointment required for further refills 60 tablet 1     ALLERGIES  No Known Allergies  PAST MEDICAL HISTORY:  Past Medical History:   Diagnosis Date    Acute respiratory failure with hypoxia (H)     Aggression 11/09/2020    Agitation 09/21/2020    LOWELL (acute kidney injury) (H)     Back injury, sequela 10/27/2017    Cardiac arrest (H) 05/17/2020    Cardiac arrest (H)     Cognitive disorder 11/11/2020    Depressive disorder     Dysphagia 11/11/2020    Dysphagia     Gastrojejunostomy tube status (H) 11/11/2020    HTN (hypertension)     Hypoxic ischemic encephalopathy     Low blood pressure 09/17/2020    Nonverbal 11/11/2020    NSTEMI (non-ST elevated myocardial infarction) (H)     TBI (traumatic brain injury) (H) 06/25/2020     PAST SURGICAL HISTORY:  Past Surgical History:   Procedure Laterality Date    APPENDECTOMY      CARDIAC CATHETERIZATION  05/17/2020    Procedure: Coronary Angiogram; Surgeon: Chadd Newby MD; Location:  HEART CARDIAC CATH LAB      CV CORONARY ANGIOGRAM N/A 5/17/2020    Procedure: Coronary Angiogram;  Surgeon: Chadd Newby MD;  Location: [facility] CARDIAC CATH LAB    CV EXTRACORPERAL MEMBRANE OXYGENATION N/A 5/17/2020    Procedure: Extracorporeal Membrance Oxygenation;  Surgeon: Chadd Newby MD;  Location:  HEART CARDIAC CATH LAB    CV INTRA AORTIC BALLOON N/A 5/17/2020    Procedure: Intra-Aortic Balloon Pump Insertion;  Surgeon:  Chadd Newby MD;  Location: [facility] CARDIAC CATH LAB    CV PCI STENT DRUG ELUTING N/A 5/17/2020    Procedure: Percutaneous Coronary Intervention Stent Drug Eluting;  Surgeon: Chadd Newby MD;  Location: [facility] CARDIAC CATH LAB    G TUBE REPLACEMENT  8/19/2020    G TUBE REPLACEMENT  3/10/2021    G TUBE REPLACEMENT  5/20/2021    IR GASTROSTOMY TUBE CHANGE  8/19/2020    IR GASTROSTOMY TUBE CHANGE  3/10/2021    IR GASTROSTOMY TUBE CHANGE  5/20/2021    IR GASTROSTOMY TUBE PERCUTANEOUS PLCMNT  6/9/2020    OTHER SURGICAL HISTORY  05/17/2020    CV PCI STENT DRUG ELUTINGProcedure: Percutaneous Coronary Intervention Stent Drug Eluting; Surgeon: Chadd Newby MD; Location: [facility] CARDIAC CATH LAB      OTHER SURGICAL HISTORY  05/17/2020    CV INTRA AORTIC BALLON PUMP INSERTION Procedure: Intra-Aortic Balloon Pump Insertion; Surgeon: Chadd Newby MD; Location: [facility] CARDIAC CATH LAB      OTHER SURGICAL HISTORY  05/19/2020    REMOVE EXTRACORPOREAL MEMBRANE OXYGENATOR Procedure: ECMO Decannulation at Bedside; Surgeon: Mariano Workman MD; Location:  OR      OTHER SURGICAL HISTORY  05/17/2020    CV EXTRACORPOREAL MEMBRANE OXGENATIONProcedure: Extracorporeal Membrance Oxygenation; Surgeon: Chadd Newby MD; Location: [facility] CARDIAC CATH LAB      PEG TUBE PLACEMENT  06/09/2020    REMOVE EXTRACORPORAL MEMBRANE OXYGENATOR ADULT (OUTSIDE OR) N/A 5/19/2020    Procedure: ECMO Decannulation at Bedside;  Surgeon: Mariano Workman MD;  Location: [facility] APPENDECTOMY      Description: Appendectomy;  Recorded: 08/25/2014;  Comments: age 11 or 12     SOCIAL HISTORY:  Social History     Socioeconomic History    Marital status: Single     Spouse name: None    Number of children: None    Years of education: None    Highest education level: None   Tobacco Use    Smoking status: Former     Packs/day: 0.00     Years: 0.00     Pack years: 0.00     Types: Cigarettes     Start date:  "1995     Quit date: 2020     Years since quittin.9    Smokeless tobacco: Never   Vaping Use    Vaping status: Never Used   Substance and Sexual Activity    Alcohol use: Not Currently    Drug use: Not Currently    Sexual activity: Not Currently     Social Determinants of Health     Financial Resource Strain: Low Risk  (2021)    Overall Financial Resource Strain (CARDIA)     Difficulty of Paying Living Expenses: Not hard at all   Food Insecurity: No Food Insecurity (2021)    Hunger Vital Sign     Worried About Running Out of Food in the Last Year: Never true     Ran Out of Food in the Last Year: Never true   Transportation Needs: No Transportation Needs (2021)    PRAPARE - Transportation     Lack of Transportation (Medical): No     Lack of Transportation (Non-Medical): No   Physical Activity: Inactive (2021)    Exercise Vital Sign     Days of Exercise per Week: 0 days     Minutes of Exercise per Session: 0 min   Social Connections: Unknown (2021)    Social Connection and Isolation Panel [NHANES]     Frequency of Communication with Friends and Family: Never     Frequency of Social Gatherings with Friends and Family: Three times a week     Active Member of Clubs or Organizations: No     Attends Club or Organization Meetings: Never     Review of Systems:  Skin:        Eyes:       ENT:       Respiratory:  Negative    Cardiovascular:  Negative for;palpitations;chest pain;edema;fatigue;lightheadedness;dizziness;Negative    Gastroenterology:      Genitourinary:       Musculoskeletal:       Neurologic:       Psychiatric:       Heme/Lymph/Imm:       Endocrine:          Physical Exam:  Vitals: Ht 1.727 m (5' 8\")   Wt 71.2 kg (157 lb)   BMI 23.87 kg/m     Wt Readings from Last 4 Encounters:   23 71.2 kg (157 lb)   22 67.1 kg (148 lb)   22 81.6 kg (180 lb)   22 74.6 kg (164 lb 6.4 oz)     GEN: wheelchair bound, non-verbal  C/V:  Regular rate and rhythm  RESP: " Respirations are unlabored.  EXTREM: no LE edema.    Emelina Gama PA-C  [facility]      Thank you for allowing me to participate in the care of your patient.    Sincerely,     Emelina Gama PA-C     [facility]  cc:   Emelina Gama PA-C  [address]  [place] [address]

## 2023-05-16 ENCOUNTER — TELEPHONE (OUTPATIENT)
Dept: INTERNAL MEDICINE | Facility: CLINIC | Age: 43
End: 2023-05-16

## 2023-05-16 NOTE — TELEPHONE ENCOUNTER
Received a call from ROLANDO Julien with [facility], requesting verbal approval for 2 more PT visits this month to train group home staff on standing frame. Since patient had face to face visit 5/9/23 with PCP, verbal approval given.     Elizabeth Marie RN

## 2023-05-17 ENCOUNTER — LAB (OUTPATIENT)
Dept: LAB | Facility: CLINIC | Age: 43
End: 2023-05-17
Payer: COMMERCIAL

## 2023-05-17 DIAGNOSIS — S06.2X9D DIFFUSE TRAUMATIC BRAIN INJURY WITH LOSS OF CONSCIOUSNESS OF UNSPECIFIED DURATION, SUBSEQUENT ENCOUNTER: ICD-10-CM

## 2023-05-17 LAB
ERYTHROCYTE [DISTWIDTH] IN BLOOD BY AUTOMATED COUNT: 12.6 % (ref 10–15)
HCT VFR BLD AUTO: 42.2 % (ref 40–53)
HGB BLD-MCNC: 13.7 G/DL (ref 13.3–17.7)
MCH RBC QN AUTO: 29.8 PG (ref 26.5–33)
MCHC RBC AUTO-ENTMCNC: 32.5 G/DL (ref 31.5–36.5)
MCV RBC AUTO: 92 FL (ref 78–100)
PLATELET # BLD AUTO: 228 10E3/UL (ref 150–450)
RBC # BLD AUTO: 4.59 10E6/UL (ref 4.4–5.9)
WBC # BLD AUTO: 6.5 10E3/UL (ref 4–11)

## 2023-05-17 PROCEDURE — 36415 COLL VENOUS BLD VENIPUNCTURE: CPT

## 2023-05-17 PROCEDURE — 85027 COMPLETE CBC AUTOMATED: CPT

## 2023-05-25 ENCOUNTER — PATIENT OUTREACH (OUTPATIENT)
Dept: CARE COORDINATION | Facility: CLINIC | Age: 43
End: 2023-05-25

## 2023-05-25 NOTE — PROGRESS NOTES
Clinic Care Coordination Contact  [facility]/Voicemail    Referral Source: Care Team  Clinical Data: Care Coordinator Outreach  Outreach attempted x 1.  Left message on patient's voicemail with call back information and requested return call.    Plan: Care Coordinator will try to reach patient again in 1 month.     Candace Perez RN, BSN, PHN  Primary Care / Care Coordinator   [facility]  E-mail [email or web address]   [phone]

## 2023-06-03 ENCOUNTER — MEDICAL CORRESPONDENCE (OUTPATIENT)
Dept: HEALTH INFORMATION MANAGEMENT | Facility: CLINIC | Age: 43
End: 2023-06-03

## 2023-06-08 ENCOUNTER — MEDICAL CORRESPONDENCE (OUTPATIENT)
Dept: HEALTH INFORMATION MANAGEMENT | Facility: CLINIC | Age: 43
End: 2023-06-08

## 2023-06-27 ENCOUNTER — VIRTUAL VISIT (OUTPATIENT)
Dept: PSYCHIATRY | Facility: CLINIC | Age: 43
End: 2023-06-27
Payer: COMMERCIAL

## 2023-06-27 DIAGNOSIS — T88.7XXA MEDICATION SIDE EFFECTS: ICD-10-CM

## 2023-06-27 DIAGNOSIS — F34.81 DISRUPTIVE MOOD DYSREGULATION DISORDER (H): Primary | ICD-10-CM

## 2023-06-27 PROCEDURE — 99214 OFFICE O/P EST MOD 30 MIN: CPT | Mod: VID | Performed by: NURSE PRACTITIONER

## 2023-06-27 RX ORDER — QUETIAPINE FUMARATE 25 MG/1
25 TABLET, FILM COATED ORAL DAILY
Qty: 30 TABLET | Refills: 1 | Status: SHIPPED | OUTPATIENT
Start: 2023-06-27 | End: 2023-07-10

## 2023-06-27 NOTE — PATIENT INSTRUCTIONS
"Patient Education   The Panel Psychiatry Program  What to Expect  Here's what to expect in the Panel Psychiatry Program.   About the program  You'll be meeting with a psychiatric doctor to check your mental health. A psychiatric doctor helps you deal with troubling thoughts and feelings by giving you medicine. They'll make sure you know the plan for your care. You may see them for a long time. When you're feeling better, they may refer you back to seeing your family doctor.   If you have any questions, we'll be glad to talk to you.  About visits  Be open  At your visits, please talk openly about your problems. It may feel hard, but it's the best way for us to help you.  Cancelling visits  If you can't come to your visit, please call us right away at [phone]. If you don't cancel at least 24 hours (1 full day) before your visit, that's \"late cancellation.\"  Not showing up for your visits  Being very late is the same as not showing up. You'll be a \"no show\" if:  You're more than 15 minutes late for a 30-minute (half hour) visit.  You're more than 30 minutes late for a 60-minute (full hour) visit.  If you cancel late or don't show up 2 times within 6 months, we may end your care.  Getting help between visits  If you need help between visits, you can call us Monday to Friday from 8 a.m. to 4:30 p.m. at [phone].  Emergency care  Call 911 or go to the nearest emergency department if your life or someone else's life is in danger.  Call 988 anytime to reach the national Suicide and Crisis hotline.  Medicine refills  To refill your medicine, call your pharmacy. You can also call [facility] at [phone], Monday to Friday, 8 a.m. to 4:30 p.m. It can take 1 to 3 business days to get a refill.   Forms, letters, and tests  You may have papers to fill out, like FMLA, short-term disability, and workability. We can help you with these forms at your visits, but you must have an " appointment. You may need more than 1 visit for this, to be in an intensive therapy program, or both.  Before we can give you medicine for ADHD, we may refer you to get tested for it or confirm it another way.  We may not be able to give you an emotional support animal letter.  We don't do mental health checks ordered by the court.   We don't do mental health testing, but we can refer you to get tested.   Thank you for choosing us for your care.  For informational purposes only. Not to replace the advice of your health care provider. Copyright   2022 [employer]. All rights reserved. [employer] 109528 - 12/22.         Treatment Plan:      1.  Hepatic panel, ammonia level labs  2.  Add quetiapine 25 mg in the morning  3.  Continue Depakote 125 mg twice daily  4.  Continue lorazepam 1/2 tablet 2 times daily  5.  Next scheduled visit on July 10 at 2:30 PM in person at [address] [place] [address] in [facility]    Continue all other medical directions per primary care provider.   Continue all other medications as reviewed per electronic medical record today.   Safety plan reviewed. To the Emergency Department as needed or call after hours crisis line at [phone] or [phone]. [place] Crisis Text Line: Text [place] to 396099  or  Suicide LifeLine Chat: suicidepreventionlifeline.org/chat/  To schedule individual or family therapy, call [facility] at [phone].   Schedule an appointment with me July 10 or sooner as needed.  Call [facility] at [phone] to schedule.  Follow up with primary care provider as planned or for acute medical concerns.  Call the psychiatric nurse line with medication questions or concerns at [phone].  [facility] may be used to communicate with your provider, but this is not intended to be used for emergencies.    Crisis Resources:    National Suicide Prevention Lifeline: 596.570.9237 (TTY: 852.756.6765). Call anytime for help.   (www.suicidepreventionlifeline.org)  National [place] on Mental Illness (www.melvin.org): 464-473-2234 or 922-235-1554.   Mental Health Association (www.mentalhealth.org): 664.201.2817 or 476-124-4888.  [place] Crisis Text Line: Text [place] to 850053  Suicide LifeLine Chat: suicideprevention"[employer]line.org/chat

## 2023-06-27 NOTE — NURSING NOTE
Is the patient currently in the state of [place]? YES - at home.    Visit mode:VIDEO    If the visit is dropped, the patient can be reconnected by: VIDEO VISIT: Text to cell phone:   Telephone Information:   Mobile [phone]       Will anyone else be joining the visit? Yes: How would they like to receive their invite Text to cell phone: [phone]  (If patient encounters technical issues they should call [phone])    How would you like to obtain your AVS? MyChart    Are changes needed to the allergy or medication list? NO    Rooming Documentation: Questionnaire(s) not assigned, not done per department protocol.    Reason for visit: SYEDA Wu, VVF

## 2023-06-27 NOTE — PROGRESS NOTES
"Virtual Visit Details    Type of service:  Video Visit   Video Start Time: 11:16 AM  Video End Time: 11:45 AM    Originating Location (pt. Location): Long term Care    Distant Location (provider location):  Off-site  Platform used for Video Visit: [facility] Psychiatric Progress Note    Name: Scot Bishop   : 1980                    Primary Care Provider: Cachorro Morales MD   Therapist: None    PHQ-9 scores:      11/15/2021    12:08 PM 2022    12:57 PM 4/3/2023    12:46 PM   PHQ-9 SCORE   PHQ-9 Total Score MyChart 4 (Minimal depression) 11 (Moderate depression) 3 (Minimal depression)   PHQ-9 Total Score 4 11 3       CLEMENT-7 scores:      2022    12:48 PM   CLEMENT-7 SCORE   Total Score 12 (moderate anxiety)   Total Score 12       Patient Identification:    Patient is a 42 year old year old, single  White Not  or  male  who presents for return visit with me.  Patient is currently disabled. Patient attended the session with his father today  , who they agreed to have interview with. Patient prefers to be marin \"Scot\".    Current medications include: acetaminophen (TYLENOL) 325 MG tablet, Take 1 tablet (325 mg) by mouth every 6 hours as needed for mild pain or fever  aspirin (ASPIRIN LOW DOSE) 81 MG chewable tablet, TAKE ONE TABLET PER G TUBE EVERY DAY.  atorvastatin (LIPITOR) 40 MG tablet, Take one tablet (40 mg) per G tube every night at bedtime.  divalproex sodium delayed-release (DEPAKOTE) 125 MG DR tablet, Take 1 tablet (125 mg) by mouth 2 times daily  docusate sodium (COLACE) 100 MG capsule, Take 1 capsule (100 mg) by mouth 2 times daily as needed for constipation  LORazepam (ATIVAN) 0.5 MG tablet, Take 0.5 tablets (0.25 mg) by mouth 2 times daily  midodrine (PROAMATINE) 2.5 MG tablet, TAKE ONE TABLET BY MOUTH OR BY FEEDING TUBE THREE TIMES DAILY WITH MEALS Strength: 2.5 mg. Appointment required for further refills  nystatin (MYCOSTATIN) 704796 UNIT/GM external " cream, APPLY TO BUTTOCKS TWICE DAILY AS NEEDED  PARoxetine (PAXIL) 30 MG tablet, Take 1 tablet (30 mg) by mouth every morning  sodium chloride 1 GM tablet, Take 1 tablet (1 g) by mouth daily as needed (for low blood pressure before PT session) Appointment required for further refills    No current facility-administered medications on file prior to visit.       The [place] Prescription Monitoring Program has been reviewed and there are no concerns about diversionary activity for controlled substances at this time.      I was able to review most recent Primary Care Provider, specialty provider, and therapy visit notes that I have access to.     Today, his father reports that Scot is not doing well.  He has been screaming and hollering at the top of his voice.  He has been stiffening his limbs.  There seems to be no precipitating factors to start this kind of presentation.  During our visit today, Scot was mostly controlled.     has a past medical history of Acute respiratory failure with hypoxia (H), Aggression (11/09/2020), Agitation (09/21/2020), LOWELL (acute kidney injury) (H), Back injury, sequela (10/27/2017), Cardiac arrest (H) (05/17/2020), Cardiac arrest (H), Cognitive disorder (11/11/2020), Depressive disorder, Dysphagia (11/11/2020), Dysphagia, Gastrojejunostomy tube status (H) (11/11/2020), HTN (hypertension), Hypoxic ischemic encephalopathy, Low blood pressure (09/17/2020), Nonverbal (11/11/2020), NSTEMI (non-ST elevated myocardial infarction) (H), and TBI (traumatic brain injury) (H) (06/25/2020).    Social history updates:    Scot continues to live in the long-term care facility as he requires assistance eating and performing ADLs.  When he is not in bed he is wheelchair bound    Substance use updates:    No alcohol use reported  Tobacco use: No    Vital Signs:   There were no vitals taken for this visit.    Labs:    Most recent laboratory results reviewed and no new labs.     Mental Status  Examination:  Appearance: awake, alert and casual dress  Attitude: uncooperative  Eye Contact:  poor  Gait and Station: No dizziness or falls and Uses wheelchair  Psychomotor Behavior:  fidgeting  Oriented to:   Person  Attention Span and Concentration:  Poor  Speech:   vtspeech: mute  Mood:  labile  Affect:  restricted range  Associations:   Unable to assess  Thought Process:   [place]  Thought Content:  no evidence of suicidal ideation or homicidal ideation  Recent and Remote Memory:  poor Not formally assessed. No amnesia.  Fund of Knowledge: low-normal  Insight:   Poor  Judgment:  Poor  Impulse Control:   4    Suicide Risk Assessment:  Today Scot Bishop exhibited no thoughts to harm themself or others. In addition, there are notable risk factors for self-harm, including comorbid medical condition of traumatic brain injury, anger/rage, and mood change. However, risk is mitigated by 24-hour supervision in a care facility. Therefore, based on all available evidence including the factors cited above, Scot Bishop does not appear to be at imminent risk for self-harm, does not meet criteria for a 72-hr hold, and therefore remains appropriate for ongoing outpatient level of care.  A thorough assessment of risk factors related to suicide and self-harm have been reviewed and are noted above. The patient convincingly denies suicidality on several occasions. Local community safety resources printed and reviewed for patient to use if needed. There was no deceit detected, and the patient presented in a manner that was believable.     DSM5 Diagnosis:  296.99 (F34.8) Disruptive Mood Dysregulation Disorder    Medical comorbidities include:   Patient Active Problem List    Diagnosis Date Noted    Hypotension, unspecified hypotension type 01/17/2022     Priority: Medium    CARDIOVASCULAR SCREENING; LDL GOAL LESS THAN 100 11/16/2021     Priority: Medium    Ischemic encephalopathy 09/29/2021     Priority: Medium      Last Assessment & Plan:   Formatting of this note might be different from the original.  Hypoxic brain injury 5/2020.   Largely non-verbal.  Not following commands.   Lip smacking mouth movements.     Plan   - check EEG to evaluate for seizures   - continue therapy evaluations   - prognosis - guarded, history and exam are consistent with severe, permanent brain injury      Nicotine dependence 04/16/2021     Priority: Medium     Formatting of this note might be different from the original.  Created by Conversion      Thrombocytopenia, unspecified (H) 04/16/2021     Priority: Medium    Thrush, oral 12/09/2020     Priority: Medium    Advance care planning 12/04/2020     Priority: Medium     Formatting of this note might be different from the original.  Community Palliative [facility] was asked to see patient by inpatient palliative care on 11/23 for continuation of palliative care in a new setting.  Date of last visit: 12/9/2020    Formatting of this note is different from the original.  Advance Care Planning   Patient has identified Health Care Agent(s): Yes  Add Health Care Agents: Yes    Health Care Agent(s):  Guardian: Gerald Bishop Relationship: father Phone: [phone]     Patient has Advance Care Plan Documents (Health Care Directive, POLST): Yes    Advance Care Plan Documents:  POLST Form     Patient has identified Specific Treatment Preferences: Yes     How have preferences been verified: POLST    Specific Treatment Preferences:   a.) Code Status:  DNR/ Do Not Attempt Resuscitation - Allow a Natural Death  b.) Goals of Treatment:   ii. Selective Treatment: Use medical treatment, antibiotics, IV fluids and cardiac monitor as indicated. No intubation, advanced airway interventions, or mechanical ventilation. May consider less invasive airway support (e.g. CPAP, BiPAP). Transfer to hospital if indicated. Generally avoid the intensive care unit. All patients will receive comfort-focused treatments.  TREATMENT PLAN:  Provide basic medical treatments aimed at treating new or reversible illness.  c.) Interventions and Treatments:  i.  Artificially Administered Nutrition and Hydration: - Long term artificial nutrion/hydration by tube through (not specified) (specify mouth/nose) and (not specified) (specify if ok with directly into GI tract)  ii.  Antibiotics: - Oral antibiotics only (NO IV/IM)      Acute respiratory failure with hypoxia (H) 12/04/2020     Priority: Medium    Gastrojejunostomy tube status (H) 11/11/2020     Priority: Medium    Cognitive disorder 11/11/2020     Priority: Medium    Aggression 11/09/2020     Priority: Medium    Other symptoms and signs involving appearance and behavior 11/09/2020     Priority: Medium    Agitation 09/21/2020     Priority: Medium    G tube feedings (H) 09/03/2020     Priority: Medium    Aphasia 08/05/2020     Priority: Medium    Dysphagia 08/04/2020     Priority: Medium    Non-ST elevation (NSTEMI) myocardial infarction (H) 07/31/2020     Priority: Medium    Cardiac arrest due to other underlying condition (H) 07/31/2020     Priority: Medium    Encephalopathy, unspecified 07/31/2020     Priority: Medium    Dysphagia, unspecified 07/31/2020     Priority: Medium    Diffuse traumatic brain injury with loss of consciousness of unspecified duration, subsequent encounter 07/31/2020     Priority: Medium    Hypoxic ischemic encephalopathy (HIE), unspecified 07/31/2020     Priority: Medium    Traumatic brain injury (H) 06/25/2020     Priority: Medium    Cardiac arrest (H) 05/17/2020     Priority: Medium    Back injury, sequela 10/27/2017     Priority: Medium     Formatting of this note might be different from the original.  WC Case w/ [employer] ([email or web address])  CLM #[id number];  Lydia Machuca [phone]         Assessment:    Scot Bishop presented with his father to talk about recent behavior changes.  He has had loud verbal outbursts and stiffness in his  extremities at times that has made it difficult for care team staff to manage him.  I added 25 mg of quetiapine in the morning for him to help him be able to relax during the day.  He will continue Depakote twice daily to help with mood regulation.  I did order a hepatic panel and ammonia level to make sure that the Depakote is not toxic to him.  I scheduled him for a visit with me on July 10 in person to be able to thoroughly assess his status.  Lorazepam continues twice daily..    Medication side effects and alternatives were reviewed. Health promotion activities recommended and reviewed today. All questions addressed. Education and counseling completed regarding risks and benefits of medications and psychotherapy options.    Treatment Plan:      1.  Hepatic panel, ammonia level labs  2.  Add quetiapine 25 mg in the morning  3.  Continue Depakote 125 mg twice daily  4.  Continue lorazepam 1/2 tablet 2 times daily  5.  Next scheduled visit on July 10 at 2:30 PM in person at [address] [place] [address] in [facility]    Continue all other medications as reviewed per electronic medical record today.   Safety plan reviewed. To the Emergency Department as needed or call after hours crisis line at [phone] or [phone]. [place] Crisis Text Line. Text [place] to 512697 or Suicide LifeLine Chat: suicidepreventionlifeline.org/chat/  To schedule individual or family therapy, call [facility] at [phone]  Schedule an appointment with me July 10 or sooner as needed. Call [facility] at [phone] to schedule.  Follow up with primary care provider as planned or for acute medical concerns.  Call the psychiatric nurse line with medication questions or concerns at [phone]  MyChart may be used to communicate with your provider, but this is not intended to be used for emergencies.    Crisis Resources:    National Suicide Prevention Lifeline: 297.342.7239 (TTY: 376.123.7361).  Call anytime for help.  (www.suicidepreventionlifeline.org)  National [place] on Mental Illness (www.melvin.org): 766.919.7904 or 197-685-8628.   Mental Health Association (www.mentalhealth.org): 834.440.8050 or 055-710-7499.  [place] Crisis Text Line: Text [place] to 573928  Suicide LifeLine Chat: suicideMV [employer].org/chat    Administrative Billing:   Time spent with patient includes counseling and coordination of care regarding above diagnoses and treatment plan.    Patient Status:  This is a continuous care patient and medications will be prescribed by the psychiatric provider until further indicated.    Signed:   LASHAWN Jeong-BC   Psychiatry

## 2023-07-10 ENCOUNTER — OFFICE VISIT (OUTPATIENT)
Dept: PSYCHIATRY | Facility: CLINIC | Age: 43
End: 2023-07-10
Payer: COMMERCIAL

## 2023-07-10 VITALS — HEART RATE: 87 BPM | DIASTOLIC BLOOD PRESSURE: 59 MMHG | SYSTOLIC BLOOD PRESSURE: 87 MMHG

## 2023-07-10 DIAGNOSIS — F34.81 DISRUPTIVE MOOD DYSREGULATION DISORDER (H): Primary | ICD-10-CM

## 2023-07-10 PROCEDURE — 99214 OFFICE O/P EST MOD 30 MIN: CPT | Performed by: NURSE PRACTITIONER

## 2023-07-10 NOTE — PROGRESS NOTES
Mental Health and Collaborative Care Psychiatry Service Rooming Note      Most pressing mental health concern at this time: head tilting to the side since he started on Seroquel. Pt is accompanied by his parents and [facility] manager. Takes medications with apple sause      Any new physical health conditions or diagnoses affecting you that we should be aware of: none      Side effects related to medications patient would like to discuss with the provider:  Yes - more tired      Are you taking your medications as prescribed?  yes  If not, why? NA      Do you need refills of any of the medications?  Unk  If so, which ones? pended      Are you taking any recreational substances? denies      I    Add attendance guidelines .phrase here   Care team has reviewed attendance agreement with patient. Patient advised that two failed appointments within 6 months may lead to termination of current episode of care.        Negrita Castillo LPN  July 10, 2023  2:40 PM

## 2023-07-10 NOTE — PROGRESS NOTES
"         Outpatient Psychiatric Progress Note    Name: Scot Bishop   : 1980                    Primary Care Provider: Cachorro Morales MD   Therapist: None    PHQ-9 scores:      11/15/2021    12:08 PM 2022    12:57 PM 4/3/2023    12:46 PM   PHQ-9 SCORE   PHQ-9 Total Score MyChart 4 (Minimal depression) 11 (Moderate depression) 3 (Minimal depression)   PHQ-9 Total Score 4 11 3       CLEMENT-7 scores:      2022    12:48 PM   CLEMENT-7 SCORE   Total Score 12 (moderate anxiety)   Total Score 12       Patient Identification:    Patient is a 43 year old year old, single  White Not  or  male  who presents for return visit with me.  Patient is currently disabled. Patient attended the session with parents and caregiver , who they agreed to have interview with. Patient prefers to be called: \" Scot\".    Current medications include: acetaminophen (TYLENOL) 325 MG tablet, Take 1 tablet (325 mg) by mouth every 6 hours as needed for mild pain or fever  aspirin (ASPIRIN LOW DOSE) 81 MG chewable tablet, TAKE ONE TABLET PER G TUBE EVERY DAY.  atorvastatin (LIPITOR) 40 MG tablet, Take one tablet (40 mg) per G tube every night at bedtime.  divalproex sodium delayed-release (DEPAKOTE) 125 MG DR tablet, Take 1 tablet (125 mg) by mouth 2 times daily  docusate sodium (COLACE) 100 MG capsule, Take 1 capsule (100 mg) by mouth 2 times daily as needed for constipation  LORazepam (ATIVAN) 0.5 MG tablet, Take 0.5 tablets (0.25 mg) by mouth 2 times daily  midodrine (PROAMATINE) 2.5 MG tablet, TAKE ONE TABLET BY MOUTH OR BY FEEDING TUBE THREE TIMES DAILY WITH MEALS Strength: 2.5 mg. Appointment required for further refills  nystatin (MYCOSTATIN) 696506 UNIT/GM external cream, APPLY TO BUTTOCKS TWICE DAILY AS NEEDED  PARoxetine (PAXIL) 30 MG tablet, Take 1 tablet (30 mg) by mouth every morning  QUEtiapine (SEROQUEL) 25 MG tablet, Take 1 tablet (25 mg) by mouth daily  sodium chloride 1 GM tablet, Take 1 tablet (1 g) by " mouth daily as needed (for low blood pressure before PT session) Appointment required for further refills (Patient not taking: Reported on 6/27/2023)    No current facility-administered medications on file prior to visit.       The [place] Prescription Monitoring Program has been reviewed and there are no concerns about diversionary activity for controlled substances at this time.      I was able to review most recent Primary Care Provider, specialty provider, and therapy visit notes that I have access to.     Today, staff and parents report that he was having behavior outbursts with yelling and resistance to staff performing ADLs on him or moving him.  Quetiapine was added and now he appears to be more relaxed but still has difficulties when being transported or when moved.  The dose originally prescribed seem to be making him feel very sedated during the day.  He was quiet and calm during this visit.  He responded to his stepmother feeding him crackers.  Diagnosis was reviewed today with the family.     has a past medical history of Acute respiratory failure with hypoxia (H), Aggression (11/09/2020), Agitation (09/21/2020), LOWELL (acute kidney injury) (H), Back injury, sequela (10/27/2017), Cardiac arrest (H) (05/17/2020), Cardiac arrest (H), Cognitive disorder (11/11/2020), Depressive disorder, Dysphagia (11/11/2020), Dysphagia, Gastrojejunostomy tube status (H) (11/11/2020), HTN (hypertension), Hypoxic ischemic encephalopathy, Low blood pressure (09/17/2020), Nonverbal (11/11/2020), NSTEMI (non-ST elevated myocardial infarction) (H), and TBI (traumatic brain injury) (H) (06/25/2020).    Social history updates:    Scot continues to live in a 24-hour care facility as he is wheelchair bound and unable to feed himself.    Substance use updates:    No alcohol use  Tobacco use: No    Vital Signs:   There were no vitals taken for this visit.    Labs:    Most recent laboratory results reviewed and no new labs.      Mental Status Examination:  Appearance: casual dress and awake  Attitude:  Quiet with eye contact when his name is called out  Eye Contact:  looking around room  Gait and Station: Uses wheelchair  Psychomotor Behavior:  fidgeting and mouth movements  Oriented to:   Person  Attention Span and Concentration:  Poor  Speech:   vtspeech: mute  Mood:   Calm  Affect:  restricted range and nonreactive  Associations:   Unable to assess  Thought Process:  linear  Thought Content:  no evidence of suicidal ideation or homicidal ideation  Recent and Remote Memory:  poor Not formally assessed. No amnesia.  Fund of Knowledge: Impaired  Insight:   Poor  Judgment:  Poor  Impulse Control:   4    Suicide Risk Assessment:  Today Scot Bishop exhibits no actions to harm themself . In addition, there are notable risk factors for self-harm, including anxiety and comorbid medical condition of traumatic brain injury . However, risk is mitigated by 24-hour supervision in a care facility. Therefore, based on all available evidence including the factors cited above, Scot Bishop does not appear to be at imminent risk for self-harm, does not meet criteria for a 72-hr hold, and therefore remains appropriate for ongoing outpatient level of care.  A thorough assessment of risk factors related to suicide and self-harm have been reviewed and are noted above. The patient convincingly denies suicidality on several occasions. Local community safety resources printed and reviewed for patient to use if needed. There was no deceit detected, and the patient presented in a manner that was believable.     DSM5 Diagnosis:  296.99 (F34.8) Disruptive Mood Dysregulation Disorder    Medical comorbidities include:   Patient Active Problem List    Diagnosis Date Noted    Hypotension, unspecified hypotension type 01/17/2022     Priority: Medium    CARDIOVASCULAR SCREENING; LDL GOAL LESS THAN 100 11/16/2021     Priority: Medium    Ischemic  encephalopathy 09/29/2021     Priority: Medium     Last Assessment & Plan:   Formatting of this note might be different from the original.  Hypoxic brain injury 5/2020.   Largely non-verbal.  Not following commands.   Lip smacking mouth movements.     Plan   - check EEG to evaluate for seizures   - continue therapy evaluations   - prognosis - guarded, history and exam are consistent with severe, permanent brain injury        Nicotine dependence 04/16/2021     Priority: Medium     Formatting of this note might be different from the original.  Created by Conversion        Thrombocytopenia, unspecified (H) 04/16/2021     Priority: Medium    Thrush, oral 12/09/2020     Priority: Medium    Advance care planning 12/04/2020     Priority: Medium     Formatting of this note might be different from the original.  Community Palliative Care was asked to see patient by inpatient palliative care on 11/23 for continuation of palliative care in a new setting.  Date of last visit: 12/9/2020    Formatting of this note is different from the original.  Advance Care Planning   Patient has identified Health Care Agent(s): Yes  Add Health Care Agents: Yes    Health Care Agent(s):  Guardian: Gerald Bishop Relationship: father Phone: [phone]     Patient has Advance Care Plan Documents (Health Care Directive, POLST): Yes    Advance Care Plan Documents:  POLST Form     Patient has identified Specific Treatment Preferences: Yes     How have preferences been verified: POLST    Specific Treatment Preferences:   a.) Code Status:  DNR/ Do Not Attempt Resuscitation - Allow a Natural Death  b.) Goals of Treatment:   ii. Selective Treatment: Use medical treatment, antibiotics, IV fluids and cardiac monitor as indicated. No intubation, advanced airway interventions, or mechanical ventilation. May consider less invasive airway support (e.g. CPAP, BiPAP). Transfer to hospital if indicated. Generally avoid the intensive care unit. All patients will  receive comfort-focused treatments.  TREATMENT PLAN: Provide basic medical treatments aimed at treating new or reversible illness.  c.) Interventions and Treatments:  i.  Artificially Administered Nutrition and Hydration: - Long term artificial nutrion/hydration by tube through (not specified) (specify mouth/nose) and (not specified) (specify if ok with directly into GI tract)  ii.  Antibiotics: - Oral antibiotics only (NO IV/IM)        Acute respiratory failure with hypoxia (H) 12/04/2020     Priority: Medium    Gastrojejunostomy tube status (H) 11/11/2020     Priority: Medium    Cognitive disorder 11/11/2020     Priority: Medium    Aggression 11/09/2020     Priority: Medium    Other symptoms and signs involving appearance and behavior 11/09/2020     Priority: Medium    Agitation 09/21/2020     Priority: Medium    G tube feedings (H) 09/03/2020     Priority: Medium    Aphasia 08/05/2020     Priority: Medium    Dysphagia 08/04/2020     Priority: Medium    Non-ST elevation (NSTEMI) myocardial infarction (H) 07/31/2020     Priority: Medium    Cardiac arrest due to other underlying condition (H) 07/31/2020     Priority: Medium    Encephalopathy, unspecified 07/31/2020     Priority: Medium    Dysphagia, unspecified 07/31/2020     Priority: Medium    Diffuse traumatic brain injury with loss of consciousness of unspecified duration, subsequent encounter 07/31/2020     Priority: Medium    Hypoxic ischemic encephalopathy (HIE), unspecified 07/31/2020     Priority: Medium    Traumatic brain injury (H) 06/25/2020     Priority: Medium    Cardiac arrest (H) 05/17/2020     Priority: Medium    Back injury, sequela 10/27/2017     Priority: Medium     Formatting of this note might be different from the original.  WC Case w/ [employer] (www.accidentfund.com)  CLM #[id number];  Lydiacaroline Avilaes [phone]           Assessment:    Scot LINCOLN Bishop was brought in today by his parents and care team staff.  He was  nonverbal but did respond by looking at me when I called out his name.  His discus was questionable for tardive dyskinesia.  When they quetiapine had been added prior to this visit, staff and parents felt he became overly sedated.  I changed the dose to be divided twice daily.  Lorazepam has also been changed to 0.25 mg twice daily, both of these medications being administered at 8 AM and 4 PM.  This is in response to his recent verbal outbursts and resistance to care performed by the staff at his care facility.  Depakote will continue at 125 mg 2 times daily.  Lab work that was ordered previously has been encouraged to be completed at the earliest convenience to continue to monitor for medication side effects.  Diagnosis was reviewed with the family today.    Medication side effects and alternatives were reviewed. Health promotion activities recommended and reviewed today. All questions addressed. Education and counseling completed regarding risks and benefits of medications and psychotherapy options.    Treatment Plan:      1.  Depakote 125 mg 2 times daily  2.  Lorazepam changed to 0.25 mg by mouth 2 times daily at 8 AM and 4 PM  3.  Continue paroxetine 30 mg daily  4.  Change quetiapine to 12.5 mg 2 times daily at 8 AM and 4 PM    Continue all other medications as reviewed per electronic medical record today.   Safety plan reviewed. To the Emergency Department as needed or call after hours crisis line at [phone] or [phone]. [place] Crisis Text Line. Text [place] to 653580 or Suicide LifeLine Chat: suicidepreventionlifeline.org/chat/  To schedule individual or family therapy, call [facility] at [phone]  Schedule an appointment with me in 2 months or sooner as needed. Call [facility] at [phone] to schedule.  Follow up with primary care provider as planned or for acute medical concerns.  Call the psychiatric nurse line with medication questions or concerns at  [phone]  SocialVesthart may be used to communicate with your provider, but this is not intended to be used for emergencies.    Crisis Resources:    National Suicide Prevention Lifeline: 920.445.2311 (TTY: 898.353.8203). Call anytime for help.  (www.suicidepreventionlifeline.org)  National [place] on Mental Illness (www.melvin.org): 224.853.6425 or 916-993-5010.   Mental Health Association (www.mentalhealth.org): 175.225.2138 or 777-929-6452.  [place] Crisis Text Line: Text [place] to 204381  Suicide LifeLine Chat: suicideGruvItline.org/chat    Administrative Billing:   Time spent with patient includes counseling and coordination of care regarding above diagnoses and treatment plan.    Patient Status:  This is a continuous care patient and medications will be prescribed by the psychiatric provider until further indicated.    Signed:   LASHAWN Jeong-BC   Psychiatry

## 2023-07-11 ENCOUNTER — PATIENT OUTREACH (OUTPATIENT)
Dept: CARE COORDINATION | Facility: CLINIC | Age: 43
End: 2023-07-11

## 2023-07-11 NOTE — PROGRESS NOTES
Clinic Care Coordination Contact  [facility]/Voicemail    Referral Source: Care Team  Clinical Data: Care Coordinator Outreach  Outreach attempted x 2.  Left message on patient's fathers voicemail with call back information and requested return call.    Plan: Care Coordinator will try to reach patient again in 1 month.     Candace Perez RN, BSN, PHN  Primary Care / Care Coordinator   [facility]  E-mail [email or web address]   [phone]

## 2023-07-11 NOTE — LETTER
[facility]  Patient Centered Plan of Care  About Me:        Patient Name:  Scot Bishop    YOB: 1980  Age:         43 year old   Heather MRN:    [id number] Telephone Information:  Home Phone [phone]   Mobile [phone]       Address:  [address] [address] Email address:  [email or web address]      Emergency Contact(s)    Name Relationship Lgl Grd Work Phone Home Phone Mobile Phone   1. NIGEL BISHOP* Father Yes  [phone]   2. MITZYHEYDIE* Mother No  [phone]   3. [facility] HO*   [phone]    4. WALKER RAMAN D*     [phone]   5. LOS BISHOP Stepparent No  [phone]           Primary language:  English     needed? No   [facility]:  [phone] op. 1  Other communication barriers:Cognitive impairment; Other (Non verbal)    Preferred Method of Communication:  Margie  Current living arrangement: Other ([facility])    Mobility Status/ Medical Equipment: Dependent/Assisted by Another    Health Maintenance  Health Maintenance Reviewed: Due/Overdue   Health Maintenance Due   Topic Date Due     HEPATITIS B IMMUNIZATION (1 of 3 - 3-dose series) Never done     Pneumococcal Vaccine: Pediatrics (0 to 5 Years) and At-Risk Patients (6 to 64 Years) (2 - PCV) 10/13/2021           My Access Plan  Medical Emergency 911   Primary Clinic Line [facility] - [phone]   24 Hour Appointment Line [phone] or  [phone]) (toll-free)   24 Hour Nurse Line [phone] (toll-free)   Preferred Urgent Care [facility], [phone]     Preferred Hospital [facility]  [phone]     Preferred Pharmacy [facility] [place], [place] - [address]     Behavioral Health Crisis Line The National Suicide Prevention Lifeline at 1-535.216.9150 or Text/Call 198     My  Care Team Members  Patient Care Team         Relationship Specialty Notifications Start End    Cachorro Morales MD PCP - General Internal Medicine  1/19/21     STILL CURRENT AS OF 07/22/2022    Phone: [phone] Fax: [phone]         [address] [place] [phone]    Scot Matamoros MD MD Cardiology  6/12/20     Phone: [phone] Fax: [phone]         [address] [address]    Cachorro Morales MD Assigned PCP   12/24/20     Phone: [phone] Fax: [phone]         [address] [place] [phone]    EVELINA Myles CM    2/5/21     [facility]    Phone: [phone]         [facility] group home     2/5/21     [facility] group home    Phone: [phone] Fax: [phone]        Melanie financial worker Financial Resource Worker   2/5/21     [facility]    Phone: [phone]         Elsa Rahman NP Assigned Behavioral Health Provider   2/14/21     Phone: [phone] Fax: [phone]         [address] [place] [address]    Candace Perez, RN Lead Care Coordinator  [facility] 8/19/21     Emelina Gama PA-C Physician Assistant Cardiovascular Disease  3/24/23     Phone: [phone] Fax: [phone] [address] [place] [address]    Emelina Gama PA-C Assigned Heart and Vascular Provider   5/13/23     Phone: [phone] Fax: [phone] [address] [place] [address]              My Care Plans  Self Management and Treatment Plan  Care Plan  Care Plan: Physical Therapy       Problem: Lifestyle choices       Goal: Functional       Start Date: 8/20/2021 Expected End Date: 10/11/2023    This Visit's Progress: 80% Recent Progress: 80%    Note:     Goal Statement: Patient will continue Outpatient Physical Therapy or Home Care Physical Therapy PRN; Group Home with use EZ lift to keep patient mobile/up  Barriers: Deconditioned   Strengths:Supportive  parents   Patient/caregiver expressed understanding of goal: Yes  Action steps to achieve this goal:  1. I/caregiver will keep future appointments  2. I/caregiver will discuss, review, schedule and complete recommended overdue health maintenance with my primary care provider  3. I/caregiver will contact my care team with questions, concerns, support needs   4. I/caregiver will use the clinic as a resource, and I understand I can contact my clinic with 24/7 after hours services available                                Action Plans on File:     Advance Care Plans/Directives Type:   Advanced Directive - On File; DNR/DNI      My Medical and Care Information  Problem List   Patient Active Problem List   Diagnosis     Cardiac arrest (H)     Traumatic brain injury (H)     Thrush, oral     Nicotine dependence     Gastrojejunostomy tube status (H)     G tube feedings (H)     Advance care planning     Dysphagia     Cognitive disorder     Back injury, sequela     Agitation     Aggression     Acute respiratory failure with hypoxia (H)     Thrombocytopenia, unspecified (H)     CARDIOVASCULAR SCREENING; LDL GOAL LESS THAN 100     Hypotension, unspecified hypotension type     Aphasia     Non-ST elevation (NSTEMI) myocardial infarction (H)     Other symptoms and signs involving appearance and behavior     Cardiac arrest due to other underlying condition (H)     Encephalopathy, unspecified     Dysphagia, unspecified     Diffuse traumatic brain injury with loss of consciousness of unspecified duration, subsequent encounter     Hypoxic ischemic encephalopathy (HIE), unspecified     Ischemic encephalopathy      Current Medications and Allergies:  No Known Allergies  Current Outpatient Medications   Medication     acetaminophen (TYLENOL) 325 MG tablet     aspirin (ASPIRIN LOW DOSE) 81 MG chewable tablet     atorvastatin (LIPITOR) 40 MG tablet     divalproex sodium delayed-release (DEPAKOTE) 125 MG DR tablet     docusate sodium (COLACE)  100 MG capsule     LORazepam (ATIVAN) 0.5 MG tablet     midodrine (PROAMATINE) 2.5 MG tablet     nystatin (MYCOSTATIN) 187573 UNIT/GM external cream     PARoxetine (PAXIL) 30 MG tablet     QUEtiapine (SEROQUEL) 25 MG tablet     sodium chloride 1 GM tablet     No current facility-administered medications for this visit.         Care Coordination Start Date: 8/20/2021   Frequency of Care Coordination: monthly     Form Last Updated: 07/11/2023

## 2023-07-14 ENCOUNTER — TELEPHONE (OUTPATIENT)
Dept: PSYCHIATRY | Facility: CLINIC | Age: 43
End: 2023-07-14

## 2023-07-14 DIAGNOSIS — F34.81 DISRUPTIVE MOOD DYSREGULATION DISORDER (H): ICD-10-CM

## 2023-07-14 NOTE — TELEPHONE ENCOUNTER
Reason for call:  Medication   If this is a refill request, has the caller requested the refill from the pharmacy already? Yes  Will the patient be using a [facility]? No  Name of the pharmacy and phone number for the current request: [facility] and [phone]    Name of the medication requested: lorazepam    Other request: na    Phone number to reach patient:  Other phone number:  [facility] [phone]  Best Time:  anytime    Can we leave a detailed message on this number?  YES    Travel screening: Not Applicable

## 2023-07-17 NOTE — TELEPHONE ENCOUNTER
Call place to [facility] regarding request. [facility] states she isn't looking for a Lorazepam refill but for medication changes that were made at 7/14/23 appt with Elsa Rahman NP. She requests that she needs copy of orders (AVS).    Instructed that message would be sent to provider.

## 2023-07-20 RX ORDER — PAROXETINE 30 MG/1
30 TABLET, FILM COATED ORAL EVERY MORNING
Qty: 30 TABLET | Refills: 3 | Status: SHIPPED | OUTPATIENT
Start: 2023-07-20 | End: 2023-07-20

## 2023-07-20 RX ORDER — QUETIAPINE FUMARATE 25 MG/1
12.5 TABLET, FILM COATED ORAL 2 TIMES DAILY
Qty: 30 TABLET | Refills: 3 | Status: SHIPPED | OUTPATIENT
Start: 2023-07-20 | End: 2023-07-20

## 2023-07-20 RX ORDER — LORAZEPAM 0.5 MG/1
0.25 TABLET ORAL
Qty: 30 TABLET | Refills: 3 | Status: SHIPPED | OUTPATIENT
Start: 2023-07-20 | End: 2023-07-20

## 2023-07-20 NOTE — TELEPHONE ENCOUNTER
Call placed to [facility] to inquire about the fax number for the group home. The AVS is complete and the group home needs this sent to them.     LIBRA MORALES RN on 7/20/2023 at 3:29 PM

## 2023-07-21 ENCOUNTER — TELEPHONE (OUTPATIENT)
Dept: PSYCHIATRY | Facility: CLINIC | Age: 43
End: 2023-07-21

## 2023-07-21 NOTE — TELEPHONE ENCOUNTER
Jennifer from [facility] called and provided fax number [phone]. AVS from 7/10/23 visit was faxed.         LIBRA MORALES RN on 7/21/2023 at 2:06 PM

## 2023-07-21 NOTE — TELEPHONE ENCOUNTER
Reason for call:  Other   Patient called regarding (reason for call): calling back with the fax number [phone] so pt can start the meds  Additional comments: na    Phone number to reach patient:  Other phone number:  Lexi Hogue and [phone]    Best Time:  anytime    Can we leave a detailed message on this number?  YES    Travel screening: Not Applicable

## 2023-07-25 ENCOUNTER — TELEPHONE (OUTPATIENT)
Dept: PSYCHIATRY | Facility: CLINIC | Age: 43
End: 2023-07-25

## 2023-07-25 NOTE — TELEPHONE ENCOUNTER
Reason for call: Medication question    Patient called regarding (reason for call): Received call Gerald (father) reported would like for the patient to have a medication adjustment. Reported the patient appears to in a state like a [place]     Phone number to reach patient:  Home number on file [phone] (home)    Best Time:  Anytime     Can we leave a detailed message on this number?  YES    Travel screening: Not Applicable

## 2023-07-27 NOTE — TELEPHONE ENCOUNTER
Called Father back. The Father said since he called the patient seems to have perked up. So he wants to wait with any changes right now. His parent's are going to visit him this weekend and if they feel he is still acting like a zombie they will let us know.     Last visit: When they quetiapine had been added prior to this visit, staff and parents felt he became overly sedated. I changed the dose to be divided twice daily. Lorazepam has also been changed to 0.25 mg twice daily, both of these medications being administered at 8 AM and 4 PM. This is in response to his recent verbal outbursts and resistance to care performed by the staff at his care facility.     Harriet Poe RN on 7/27/2023 at 9:47 AM

## 2023-08-23 ENCOUNTER — PATIENT OUTREACH (OUTPATIENT)
Dept: CARE COORDINATION | Facility: CLINIC | Age: 43
End: 2023-08-23

## 2023-08-23 NOTE — PROGRESS NOTES
Clinic Care Coordination Contact  [facility]/Voicemail    Referral Source: Care Team  Clinical Data: Care Coordinator Outreach  Outreach attempted x 3.  Left message on patient's fathers voicemail with call back information and requested return call.    Plan: Care Coordinator will try to reach patient again in 1 month.] and if unable to contact patients father, may consider disenrolling in Care Coordination.     Candace Perez RN, BSN, PHN  Primary Care / Care Coordinator   [facility]  E-mail [email or web address]   [phone]

## 2023-09-12 ENCOUNTER — VIRTUAL VISIT (OUTPATIENT)
Dept: PSYCHIATRY | Facility: CLINIC | Age: 43
End: 2023-09-12
Payer: COMMERCIAL

## 2023-09-12 DIAGNOSIS — F09 COGNITIVE DISORDER: ICD-10-CM

## 2023-09-12 DIAGNOSIS — F34.81 DISRUPTIVE MOOD DYSREGULATION DISORDER (H): Primary | ICD-10-CM

## 2023-09-12 PROCEDURE — 99214 OFFICE O/P EST MOD 30 MIN: CPT | Mod: VID | Performed by: NURSE PRACTITIONER

## 2023-09-12 RX ORDER — QUETIAPINE FUMARATE 25 MG/1
12.5 TABLET, FILM COATED ORAL 2 TIMES DAILY
Qty: 30 TABLET | Refills: 3 | Status: SHIPPED | OUTPATIENT
Start: 2023-09-12 | End: 2024-01-09

## 2023-09-12 RX ORDER — PAROXETINE 30 MG/1
30 TABLET, FILM COATED ORAL EVERY MORNING
Qty: 30 TABLET | Refills: 3 | Status: SHIPPED | OUTPATIENT
Start: 2023-09-12 | End: 2024-01-09

## 2023-09-12 RX ORDER — LORAZEPAM 0.5 MG/1
0.25 TABLET ORAL
Qty: 30 TABLET | Refills: 3 | Status: SHIPPED | OUTPATIENT
Start: 2023-09-12 | End: 2024-01-04

## 2023-09-12 NOTE — PATIENT INSTRUCTIONS
"Patient Education   The Panel Psychiat[facility] Program  What to Expect  Here's what to expect in the Panel Psychiatry Program.   About the program  You'll be meeting with a psychiatric doctor to check your mental health. A psychiatric doctor helps you deal with troubling thoughts and feelings by giving you medicine. They'll make sure you know the plan for your care. You may see them for a long time. When you're feeling better, they may refer you back to seeing your family doctor.   If you have any questions, we'll be glad to talk to you.  About visits  Be open  At your visits, please talk openly about your problems. It may feel hard, but it's the best way for us to help you.  Cancelling visits  If you can't come to your visit, please call us right away at [phone]. If you don't cancel at least 24 hours (1 full day) before your visit, that's \"late cancellation.\"  Not showing up for your visits  Being very late is the same as not showing up. You'll be a \"no show\" if:  You're more than 15 minutes late for a 30-minute (half hour) visit.  You're more than 30 minutes late for a 60-minute (full hour) visit.  If you cancel late or don't show up 2 times within 6 months, we may end your care.  Getting help between visits  If you need help between visits, you can call us Monday to Friday from 8 a.m. to 4:30 p.m. at [phone].  Emergency care  Call 911 or go to the nearest emergency department if your life or someone else's life is in danger.  Call 988 anytime to reach the national Suicide and Crisis hotline.  Medicine refills  To refill your medicine, call your pharmacy. You can also call [facility]'s Behavioral Access at [phone], Monday to Friday, 8 a.m. to 4:30 p.m. It can take 1 to 3 business days to get a refill.   Forms, letters, and tests  You may have papers to fill out, like FMLA, short-term disability, and workability. We can help you with these forms at your visits, but you must have an " appointment. You may need more than 1 visit for this, to be in an intensive therapy program, or both.  Before we can give you medicine for ADHD, we may refer you to get tested for it or confirm it another way.  We may not be able to give you an emotional support animal letter.  We don't do mental health checks ordered by the court.   We don't do mental health testing, but we can refer you to get tested.   Thank you for choosing us for your care.  For informational purposes only. Not to replace the advice of your health care provider. Copyright   2022 [employer]. All rights reserved. [employer] 083492 - 12/22.      Treatment Plan:    1.  Depakote 125 mg 2 times daily  2.  Lorazepam 0.25 mg 2 times daily  3.  Paroxetine 30 mg daily  4.  Quetiapine 12.5 mg 2 times daily at 8 AM and 4 PM    Continue all other medications as reviewed per electronic medical record today.   Safety plan reviewed. To the Emergency Department as needed or call after hours crisis line at [phone] or [phone]. [place] Crisis Text Line. Text [place] to 750129 or Suicide LifeLine Chat: suicidepreventionlifeline.org/chat/  To schedule individual or family therapy, call [facility] at [phone]  Schedule an appointment with me in January or sooner as needed. Call [facility] at [phone] to schedule.  Follow up with primary care provider as planned or for acute medical concerns.  Call the psychiatric nurse line with medication questions or concerns at [phone]  MyChart may be used to communicate with your provider, but this is not intended to be used for emergencies.    Crisis Resources:    National Suicide Prevention Lifeline: 963.452.6283 (TTY: 485.921.2248). Call anytime for help.  (www.suicidepreventionlifeline.org)  National [place] on Mental Illness (www.melvin.org): 720.612.6775 or 529-542-4583.   Mental Health Association (www.mentalhealth.org): 466.605.2341 or  937-484-1239.  [place] Crisis Text Line: Text [place] to 492550  Suicide LifeLine Chat: suicidepreventionlifeline.org/chat

## 2023-09-12 NOTE — PROGRESS NOTES
"Being         Outpatient Psychiatric Progress Note    Name: Scot Bishop   : 1980                    Primary Care Provider: Cachorro Morales MD   Therapist: None    PHQ-9 scores:      11/15/2021    12:08 PM 2022    12:57 PM 4/3/2023    12:46 PM   PHQ-9 SCORE   PHQ-9 Total Score MyChart 4 (Minimal depression) 11 (Moderate depression) 3 (Minimal depression)   PHQ-9 Total Score 4 11 3       CLEMENT-7 scores:      2022    12:48 PM   CLEMENT-7 SCORE   Total Score 12 (moderate anxiety)   Total Score 12       Patient Identification:    Patient is a 43 year old year old, single  White Not  or  male  who presents for return visit with me.  Patient is currently disabled. Patient attended the session with his parents and care team staff member , who they agreed to have interview with. Patient prefers to be called: \" Scot\".    Current medications include: acetaminophen (TYLENOL) 325 MG tablet, Take 1 tablet (325 mg) by mouth every 6 hours as needed for mild pain or fever  aspirin (ASPIRIN LOW DOSE) 81 MG chewable tablet, TAKE ONE TABLET PER G TUBE EVERY DAY  atorvastatin (LIPITOR) 40 MG tablet, Take one tablet (40 mg) per G tube every night at bedtime.  divalproex sodium delayed-release (DEPAKOTE) 125 MG DR tablet, Take 1 tablet (125 mg) by mouth 2 times daily  docusate sodium (COLACE) 100 MG capsule, Take 1 capsule (100 mg) by mouth 2 times daily as needed for constipation  LORazepam (ATIVAN) 0.5 MG tablet, Take 0.5 tablets (0.25 mg) by mouth 2 times daily At 8 AM and 4 PM  midodrine (PROAMATINE) 2.5 MG tablet, TAKE ONE TABLET BY MOUTH OR BY FEEDING TUBE THREE TIMES DAILY WITH MEALS Strength: 2.5 mg. Appointment required for further refills  nystatin (MYCOSTATIN) 525648 UNIT/GM external cream, APPLY TO BUTTOCKS TWICE DAILY AS NEEDED  PARoxetine (PAXIL) 30 MG tablet, Take 1 tablet (30 mg) by mouth every morning  QUEtiapine (SEROQUEL) 25 MG tablet, Take 0.5 tablets (12.5 mg) by mouth 2 times daily 8 " AM and 4 PM  sodium chloride 1 GM tablet, Take 1 tablet (1 g) by mouth daily as needed (for low blood pressure before PT session) Appointment required for further refills    No current facility-administered medications on file prior to visit.       The [place] Prescription Monitoring Program has been reviewed and there are no concerns about diversionary activity for controlled substances at this time.      I was able to review most recent Primary Care Provider, specialty provider, and therapy visit notes that I have access to.     Today, patient reports He does not like being in the standing frame.  When he is not sitting in his chair and is being moved or tended to, he has some irritability and agitation happening.  While his appetite is good, he is unable to feed himself.  Staff and his parents feel that he is at baseline.  There are no concerns for him being overmedicated.  142 pounds.  No skin breakdown.  Intermittent eye contact with me by virtual meeting noted.     has a past medical history of Acute respiratory failure with hypoxia (H), Aggression (11/09/2020), Agitation (09/21/2020), LOWELL (acute kidney injury) (H), Back injury, sequela (10/27/2017), Cardiac arrest (H) (05/17/2020), Cardiac arrest (H), Cognitive disorder (11/11/2020), Depressive disorder, Dysphagia (11/11/2020), Dysphagia, Gastrojejunostomy tube status (H) (11/11/2020), HTN (hypertension), Hypoxic ischemic encephalopathy, Low blood pressure (09/17/2020), Nonverbal (11/11/2020), NSTEMI (non-ST elevated myocardial infarction) (H), and TBI (traumatic brain injury) (H) (06/25/2020).    Social history updates:    Scot continues to live in a 24-hour [facility] care center.  He requires full assist with ADLs and feeding.      Vital Signs:   There were no vitals taken for this visit.    Labs:    Most recent laboratory results reviewed and no new labs.     Mental Status Examination:  Appearance: awake, alert and casual dress  Attitude: evasive  Eye  Contact:  looking around room  Gait and Station: Uses wheelchair  Psychomotor Behavior:  fidgeting  Oriented to:   Person  Attention Span and Concentration:  Poor  Speech:   vtspeech: mute  Mood:  anxious  Affect:  restricted range  Associations:   Unable to assess but he is able to open his mouth when he sees food  Thought Process:  linear  Thought Content:  no evidence of suicidal ideation or homicidal ideation  Recent and Remote Memory:  poor Not formally assessed. No amnesia.  Fund of Knowledge: low-normal  Insight:   Poor  Judgment:  Poor  Impulse Control:   Poor    Suicide Risk Assessment:  Today Scot staff and parents report that Scot exhibits no thoughts to harm themself or others. In addition, there are notable risk factors for self-harm, including single status, anxiety, comorbid medical condition of traumatic brain injury, and mood change. However, risk is mitigated by 24-hour supervised care. Therefore, based on all available evidence including the factors cited above, Scot Bishop does not appear to be at imminent risk for self-harm, does not meet criteria for a 72-hr hold, and therefore remains appropriate for ongoing outpatient level of care.  A thorough assessment of risk factors related to suicide and self-harm have been reviewed and are noted above. The patient convincingly denies suicidality on several occasions. Local community safety resources printed and reviewed for patient to use if needed. There was no deceit detected, and the patient presented in a manner that was believable.     DSM5 Diagnosis:  296.99 (F34.8) Disruptive Mood Dysregulation Disorder  300.09 (F41.8) Other Specified Anxiety Disorder     Medical comorbidities include:   Patient Active Problem List    Diagnosis Date Noted    Hypotension, unspecified hypotension type 01/17/2022     Priority: Medium    CARDIOVASCULAR SCREENING; LDL GOAL LESS THAN 100 11/16/2021     Priority: Medium    Ischemic encephalopathy 09/29/2021      Priority: Medium     Last Assessment & Plan:   Formatting of this note might be different from the original.  Hypoxic brain injury 5/2020.   Largely non-verbal.  Not following commands.   Lip smacking mouth movements.     Plan   - check EEG to evaluate for seizures   - continue therapy evaluations   - prognosis - guarded, history and exam are consistent with severe, permanent brain injury        Nicotine dependence 04/16/2021     Priority: Medium     Formatting of this note might be different from the original.  Created by Conversion        Thrombocytopenia, unspecified (H) 04/16/2021     Priority: Medium    Thrush, oral 12/09/2020     Priority: Medium    Advance care planning 12/04/2020     Priority: Medium     Formatting of this note might be different from the original.  Community Palliative Care was asked to see patient by inpatient palliative care on 11/23 for continuation of palliative care in a new setting.  Date of last visit: 12/9/2020    Formatting of this note is different from the original.  Advance Care Planning   Patient has identified Health Care Agent(s): Yes  Add Health Care Agents: Yes    Health Care Agent(s):  Guardian: Gerald Bishop Relationship: father Phone: [phone]     Patient has Advance Care Plan Documents (Health Care Directive, POLST): Yes    Advance Care Plan Documents:  POLST Form     Patient has identified Specific Treatment Preferences: Yes     How have preferences been verified: POLST    Specific Treatment Preferences:   a.) Code Status:  DNR/ Do Not Attempt Resuscitation - Allow a Natural Death  b.) Goals of Treatment:   ii. Selective Treatment: Use medical treatment, antibiotics, IV fluids and cardiac monitor as indicated. No intubation, advanced airway interventions, or mechanical ventilation. May consider less invasive airway support (e.g. CPAP, BiPAP). Transfer to hospital if indicated. Generally avoid the intensive care unit. All patients will receive comfort-focused  treatments.  TREATMENT PLAN: Provide basic medical treatments aimed at treating new or reversible illness.  c.) Interventions and Treatments:  i.  Artificially Administered Nutrition and Hydration: - Long term artificial nutrion/hydration by tube through (not specified) (specify mouth/nose) and (not specified) (specify if ok with directly into GI tract)  ii.  Antibiotics: - Oral antibiotics only (NO IV/IM)        Acute respiratory failure with hypoxia (H) 12/04/2020     Priority: Medium    Gastrojejunostomy tube status (H) 11/11/2020     Priority: Medium    Cognitive disorder 11/11/2020     Priority: Medium    Aggression 11/09/2020     Priority: Medium    Other symptoms and signs involving appearance and behavior 11/09/2020     Priority: Medium    Agitation 09/21/2020     Priority: Medium    G tube feedings (H) 09/03/2020     Priority: Medium    Aphasia 08/05/2020     Priority: Medium    Dysphagia 08/04/2020     Priority: Medium    Non-ST elevation (NSTEMI) myocardial infarction (H) 07/31/2020     Priority: Medium    Cardiac arrest due to other underlying condition (H) 07/31/2020     Priority: Medium    Encephalopathy, unspecified 07/31/2020     Priority: Medium    Dysphagia, unspecified 07/31/2020     Priority: Medium    Diffuse traumatic brain injury with loss of consciousness of unspecified duration, subsequent encounter 07/31/2020     Priority: Medium    Hypoxic ischemic encephalopathy (HIE), unspecified 07/31/2020     Priority: Medium    Traumatic brain injury (H) 06/25/2020     Priority: Medium    Cardiac arrest (H) 05/17/2020     Priority: Medium    Back injury, sequela 10/27/2017     Priority: Medium     Formatting of this note might be different from the original.  WC Case w/ [employer] ([email or web address])  CLM #[id number];  Lydia Machuca [phone]           Assessment:    Scot Bishop is at baseline.  No medication changes are requested today.  Should his mood dysregulation  change, we can make adjustments at that time.  He appears to be exhibiting no side effects from his medications.  Staff at his facility feel like he is manageable.  While his parents get discouraged, they are able to spend time with him and take him to various activities..    Medication side effects and alternatives were reviewed. Health promotion activities recommended and reviewed today. All questions addressed. Education and counseling completed regarding risks and benefits of medications and psychotherapy options.    Treatment Plan:    1.  Depakote 125 mg 2 times daily  2.  Lorazepam 0.25 mg 2 times daily  3.  Paroxetine 30 mg daily  4.  Quetiapine 12.5 mg 2 times daily at 8 AM and 4 PM    Continue all other medications as reviewed per electronic medical record today.   Safety plan reviewed. To the Emergency Department as needed or call after hours crisis line at [phone] or [phone]. [place] Crisis Text Line. Text [place] to 565408 or Suicide LifeLine Chat: suicideAuctions by Wallace.org/chat/  To schedule individual or family therapy, call [facility] at [phone]  Schedule an appointment with me in January or sooner as needed. Call [facility] at [phone] to schedule.  Follow up with primary care provider as planned or for acute medical concerns.  Call the psychiatric nurse line with medication questions or concerns at [phone]  MyChart may be used to communicate with your provider, but this is not intended to be used for emergencies.    Crisis Resources:    National Suicide Prevention Lifeline: 827.613.3527 (TTY: 299.449.2744). Call anytime for help.  (www.suicidepreventionlifeline.org)  National [place] on Mental Illness (www.melvin.org): 352.181.1855 or 104-315-5624.   Mental Health Association (www.mentalhealth.org): 448.495.7682 or 659-137-2120.  [place] Crisis Text [place]: Text [place] to 820396  Suicide LifeLine Chat:  suicidepreventionlifeline.org/chat    Administrative Billing:   Time spent with patient includes counseling and coordination of care regarding above diagnoses and treatment plan.    Patient Status:  This is a continuous care patient and medications will be prescribed by the psychiatric provider until further indicated.    Signed:   LASHAWN Jeong-BC   Psychiatry

## 2023-09-12 NOTE — PROGRESS NOTES
Virtual Visit Details    Type of service:  Video Visit   Video Start Time:  1:50 PM  Video End Time: 2:17 PM    Originating Location (pt. Location): Long term Care    Distant Location (provider location):  Off-site  Platform used for Video Visit: Albertina

## 2023-09-12 NOTE — NURSING NOTE
Is the patient currently in the state of [place]? YES    Visit mode:VIDEO    If the visit is dropped, the patient can be reconnected by: VIDEO VISIT: Send to e-mail at: [email or web address] reviews    Will anyone else be joining the visit? NO  (If patient encounters technical issues they should call [phone])    How would you like to obtain your AVS? MyChart    Are changes needed to the allergy or medication list? No    Reason for visit: RECHECK    Adam PARKER

## 2023-09-26 ENCOUNTER — TELEPHONE (OUTPATIENT)
Dept: INTERNAL MEDICINE | Facility: CLINIC | Age: 43
End: 2023-09-26

## 2023-09-26 DIAGNOSIS — S06.2X9D DIFFUSE TRAUMATIC BRAIN INJURY WITH LOSS OF CONSCIOUSNESS OF UNSPECIFIED DURATION, SUBSEQUENT ENCOUNTER: Primary | ICD-10-CM

## 2023-09-26 NOTE — TELEPHONE ENCOUNTER
Called and spoke to José Miguel. Informed order has been placed and faxed to [facility]. José Miguel expressed understanding and had no additional questions at this time.     Elizabeth Marie RN

## 2023-09-26 NOTE — TELEPHONE ENCOUNTER
The medical supplier that supplies his DME's has gone out of business and so we are transferring over to "[employer]. Can you send over a new order of incontinence supplies.     Please send to [employer] at Fax #[phone].    Amairani Ontiveros RN on 9/26/2023 at 12:09 PM

## 2023-10-04 NOTE — LETTER
[facility]  Patient Centered Plan of Care  About Me:        Patient Name:  Scot Bishop    YOB: 1980  Age:         42 year old   Heather MRN:    [id number] Telephone Information:  Home Phone [phone]   Mobile [phone]       Address:  [address] [address] Email address:  [email or web address]      Emergency Contact(s)    Name Relationship Lgl Grd Work Phone Home Phone Mobile Phone   1. NIGEL BISHOP* Father Yes  [phone]   2. MITZYHEYDIE* Mother No  [phone]   3. [facility] HO*   [phone]    4. WALKER RAMAN D*     [phone]   5. LOS BISHOP Stepparent No  [phone]           Primary language:  English     needed? No   [place] [facility]:  [phone] op. 1  Other communication barriers:Cognitive impairment; Other (Non verbal)    Preferred Method of Communication:  Margie  Current living arrangement: Other ([facility])    Mobility Status/ Medical Equipment: Dependent/Assisted by Another    Health Maintenance  Health Maintenance Reviewed: Due/Overdue   Health Maintenance Due   Topic Date Due     HEPATITIS B IMMUNIZATION (1 of 3 - 3-dose series) Never done     Pneumococcal Vaccine: Pediatrics (0 to 5 Years) and At-Risk Patients (6 to 64 Years) (2 - PCV) 10/13/2021           My Access Plan  Medical Emergency 911   Primary Clinic Line [facility]* - [phone]   24 Hour Appointment Line [phone] or  [phone]) (toll-free)   24 Hour Nurse Line [phone] (toll-free)   Preferred Urgent Care [facility], [phone]       Preferred Hospital [facility]  [phone]       Preferred Pharmacy [facility] [place], [place] - [address]     Behavioral Health Crisis Line The National Suicide Prevention Lifeline at 1-144.141.4359 or Text/Call 342      My Care Team Members  Patient Care Team         Relationship Specialty Notifications Start End    Cachorro Morales MD PCP - General Internal Medicine  1/19/21     STILL CURRENT AS OF 07/22/2022    Phone: [phone] Fax: [phone]         [address] [place] [address]    Scot Matamoros MD MD Cardiology  6/12/20     Phone: [phone] Fax: [phone]         [address] [facility] [address]    Cachorro Morales MD Assigned PCP   12/24/20     Phone: [phone] Fax: [phone]         [address] [place] [address]    EVELINA Myles CM    2/5/21     [facility]    Phone: [phone]         José Miguel group home     2/5/21     [facility] group home    Phone: [phone] Fax: [phone]        Melanie financial worker Financial Resource Worker   2/5/21     [facility]    Phone: [phone]         Elsa Rahman NP Assigned Behavioral Health Provider   2/14/21     Phone: [phone] Fax: [phone]         [address] [place] [address]    Bibi Marcus MD Assigned Heart and Vascular Provider   3/17/21     Phone: [phone] Pager: [phone] Fax: [phone]        [address] [facility] [address]    Candace Perez RN Lead Care Coordinator  [facility] 8/19/21     Emelina Gama PA-C Physician Assistant Cardiovascular Disease  3/24/23     Phone: [phone] Fax: [phone] [address] [address]              My Care Plans  Self Management and Treatment Plan  Care Plan  Care Plan: Physical Therapy       Problem: Lifestyle choices       Goal: Functional       Start Date: 8/20/2021 Expected End Date: 7/27/2023    This Visit's Progress: 80% Recent Progress: 80%    Note:     Goal Statement: Patient will continue Physical Therapy/Outpatient Therapy  Barriers: Deconditioned   Strengths:Supportive parents   Patient/caregiver expressed understanding of goal:  Yes  Action steps to achieve this goal:  1. I/caregiver will keep future appointments  2. I/caregiver will discuss, review, schedule and complete recommended overdue health maintenance with my primary care provider  3. I/caregiver will contact my care team with questions, concerns, support needs   4. I/caregiver will use the clinic as a resource, and I understand I can contact my clinic with 24/7 after hours services available                                Action Plans on File:     Advance Care Plans/Directives Type:   Advanced Directive - On File; DNR/DNI      My Medical and Care Information  Problem List   Patient Active Problem List   Diagnosis     Cardiac arrest (H)     Traumatic brain injury (H)     Thrush, oral     Nicotine dependence     Gastrojejunostomy tube status (H)     G tube feedings (H)     Advance care planning     Dysphagia     Cognitive disorder     Back injury, sequela     Agitation     Aggression     Acute respiratory failure with hypoxia (H)     Thrombocytopenia, unspecified (H)     CARDIOVASCULAR SCREENING; LDL GOAL LESS THAN 100     Hypotension, unspecified hypotension type     Aphasia     Non-ST elevation (NSTEMI) myocardial infarction (H)     Other symptoms and signs involving appearance and behavior     Cardiac arrest due to other underlying condition (H)     Encephalopathy, unspecified     Dysphagia, unspecified     Diffuse traumatic brain injury with loss of consciousness of unspecified duration, subsequent encounter     Hypoxic ischemic encephalopathy (HIE), unspecified     Ischemic encephalopathy      Current Medications and Allergies:  No Known Allergies  Current Outpatient Medications   Medication     acetaminophen (TYLENOL) 325 MG tablet     aspirin (ASPIRIN LOW DOSE) 81 MG chewable tablet     atorvastatin (LIPITOR) 40 MG tablet     bacitracin 500 UNIT/GM external ointment     divalproex sodium delayed-release (DEPAKOTE) 125 MG DR tablet     docusate sodium (COLACE) 100 MG capsule      LORazepam (ATIVAN) 0.5 MG tablet     midodrine (PROAMATINE) 2.5 MG tablet     mineral oil-hydrophilic petrolatum (AQUAPHOR) external ointment     Neomycin-Bacitracin-Polymyxin (SM TRIPLE ANTIBIOTIC ORIGINAL) 3.5-400-5000 OINT     nystatin (MYCOSTATIN) 325689 UNIT/GM external cream     PARoxetine (PAXIL) 30 MG tablet     sodium chloride 1 GM tablet     sulfamethoxazole-trimethoprim (BACTRIM DS) 800-160 MG tablet     No current facility-administered medications for this visit.     Care Coordination Start Date: 8/20/2021   Frequency of Care Coordination: monthly       Form Last Updated: 04/27/2023        none

## 2023-10-10 ENCOUNTER — PATIENT OUTREACH (OUTPATIENT)
Dept: NURSING | Facility: CLINIC | Age: 43
End: 2023-10-10

## 2023-10-10 NOTE — LETTER
[facility]  Patient Centered Plan of Care  About Me:        Patient Name:  Scot Bishop    YOB: 1980  Age:         43 year old   Heather MRN:    [id number] Telephone Information:  Home Phone [phone]   Mobile [phone]       Address:  [address] [address] Email address:  [email or web address]      Emergency Contact(s)    Name Relationship Lgl Grd Work Phone Home Phone Mobile Phone   1. NIGEL BISHOP* Father Yes  [phone]   2. MITZYSUZI* Mother No  [phone]   3. [facility]*   [phone]    4. WALKER RAMAN*     [phone]   5. BISHOPLOS Stepparent No  [phone]           Primary language:  English     needed? No   [facility]:  [phone] op. 1  Other communication barriers:Cognitive impairment; Other (Non verbal)    Preferred Method of Communication:  Margie  Current living arrangement: Other ([facility])    Mobility Status/ Medical Equipment: Dependent/Assisted by Another    Health Maintenance  Health Maintenance Reviewed: Due/Overdue   Health Maintenance Due   Topic Date Due    HF ACTION PLAN  Never done    HEPATITIS B IMMUNIZATION (1 of 3 - 3-dose series) Never done    Pneumococcal Vaccine: Pediatrics (0 to 5 Years) and At-Risk Patients (6 to 64 Years) (2 - PCV) 10/13/2021    BMP  05/17/2023    INFLUENZA VACCINE (1) 09/01/2023    COVID-19 Vaccine (5 - 2023-24 season) 09/01/2023    PHQ-9  10/03/2023     My Access Plan  Medical Emergency 911   Primary Clinic Line [facility]* - [phone]   24 Hour Appointment Line [phone] or  [phone]) (toll-free)   24 Hour Nurse Line [phone] (toll-free)   Preferred Urgent Care [facility], [phone]     [facility]  [phone]     [facility] -  [place], [place] - [address] [place] [address]     Behavioral Health Crisis Line The National Suicide Prevention Lifeline at 1-331.482.7580 or Text/Call 808     My Care Team Members  Patient Care Team         Relationship Specialty Notifications Start End    Cachorro Morales MD PCP - General Internal Medicine  1/19/21     STILL CURRENT AS OF 07/22/2022    Phone: [phone] Fax: [phone]         [address] [place] [id number]    Scot Matamoros MD MD Cardiology  6/12/20     Phone: [phone] Fax: [phone]         [address] [facility] [address]    Cachorro Morales MD Assigned PCP   12/24/20     Phone: [phone] Fax: [phone]         [address] [place] [address]    EVELINA Myles CM    2/5/21     [facility]    Phone: [phone]         [facility] group home     2/5/21     [facility] group home    Phone: [phone] Fax: [phone]        Melanie financial worker Financial Resource Worker   2/5/21     [facility]    Phone: [phone]         Elsa Rahman NP Assigned Behavioral Health Provider   2/14/21     Phone: [phone] Fax: [phone]          [facility] [address] [place] [address]    Candace Perez RN Lead Care Coordinator  Admissions 8/19/21     Emelina Gama PA-C Physician Assistant Cardiovascular Disease  3/24/23     Phone: [phone] Fax: [phone] [address] [place] [address]    Emelina Gama PA-C Assigned Heart and Vascular Provider   5/13/23     Phone: [phone] Fax: [phone] [address] [place] [address]              My Care Plans  Self Management and Treatment Plan  Care Plan  Care Plan: Physical Therapy       Problem: Lifestyle choices       Goal: Functional       Start Date: 8/20/2021 Expected End Date: 1/11/2024    This Visit's Progress: 90% Recent Progress: 80%    Note:     Goal Statement: Patient will continue Outpatient  Physical Therapy or Home Care Physical Therapy PRN; Group Home with use EZ lift to keep patient mobile/up  Barriers: Deconditioned   Strengths:Supportive parents   Patient/caregiver expressed understanding of goal: Yes  Action steps to achieve this goal:  1. I/caregiver will keep future appointments  2. I/caregiver will discuss, review, schedule and complete recommended overdue health maintenance with my primary care provider  3. I/caregiver will contact my care team with questions, concerns, support needs   4. I/caregiver will use the clinic as a resource, and I understand I can contact my clinic with 24/7 after hours services available                                Action Plans on File:     Advance Care Plans/Directives Type:   Advanced Directive - On File; DNR/DNI      My Medical and Care Information  Problem List   Patient Active Problem List   Diagnosis    Cardiac arrest (H)    Traumatic brain injury (H)    Thrush, oral    Nicotine dependence    Gastrojejunostomy tube status (H)    G tube feedings (H)    Advance care planning    Dysphagia    Cognitive disorder    Back injury, sequela    Agitation    Aggression    Acute respiratory failure with hypoxia (H)    Thrombocytopenia, unspecified (H24)    CARDIOVASCULAR SCREENING; LDL GOAL LESS THAN 100    Hypotension, unspecified hypotension type    Aphasia    Non-ST elevation (NSTEMI) myocardial infarction (H)    Other symptoms and signs involving appearance and behavior    Cardiac arrest due to other underlying condition (H)    Encephalopathy, unspecified    Dysphagia, unspecified    Diffuse traumatic brain injury with loss of consciousness of unspecified duration, subsequent encounter    Hypoxic ischemic encephalopathy (HIE), unspecified (H28)    Ischemic encephalopathy      Current Medications and Allergies:  No Known Allergies  Current Outpatient Medications   Medication    acetaminophen (TYLENOL) 325 MG tablet    aspirin (ASPIRIN LOW DOSE) 81 MG chewable tablet     atorvastatin (LIPITOR) 40 MG tablet    divalproex sodium delayed-release (DEPAKOTE) 125 MG DR tablet    docusate sodium (COLACE) 100 MG capsule    LORazepam (ATIVAN) 0.5 MG tablet    midodrine (PROAMATINE) 2.5 MG tablet    nystatin (MYCOSTATIN) 241640 UNIT/GM external cream    PARoxetine (PAXIL) 30 MG tablet    QUEtiapine (SEROQUEL) 25 MG tablet     No current facility-administered medications for this visit.     Care Coordination Start Date: 8/20/2021   Frequency of Care Coordination: monthly     Form Last Updated: 10/10/2023

## 2023-10-10 NOTE — PROGRESS NOTES
Clinic Care Coordination Contact  Follow Up Progress Note      Assessment:   Pilo, father, consent to communicate in ELECTRONIC MEDICAL RECORD, states the patient is status quo and still has his G Tube for fluids by mouth.  Patient has an EZ  the Group Home that Pilo, reports isn't used every day and he wishes they would utilize.    Pilo reports, patient is no longer participating in outpatient Physical Therapy or home Physical Therapy at this time and feels patient should be doing.  Pilo feels patient has declined since being discharged from outpatient Physical Therapy.  Per Pilo, will look into having home care Physical Therapy in the Group Home again even though goals have been met.    Pilo states he's exercising on a stationary bike and has no other questions or concerns at this time.    Care Gaps:    Health Maintenance Due   Topic Date Due    HF ACTION PLAN  Never done    HEPATITIS B IMMUNIZATION (1 of 3 - 3-dose series) Never done    Pneumococcal Vaccine: Pediatrics (0 to 5 Years) and At-Risk Patients (6 to 64 Years) (2 - PCV) 10/13/2021    BMP  05/17/2023    INFLUENZA VACCINE (1) 09/01/2023    COVID-19 Vaccine (5 - 2023-24 season) 09/01/2023    PHQ-9  10/03/2023     Care Gaps Last addressed on 10/10/2023    Care Plans  Care Plan: Physical Therapy       Problem: Lifestyle choices       Goal: Functional       Start Date: 8/20/2021 Expected End Date: 1/11/2024    This Visit's Progress: 90% Recent Progress: 80%    Note:     Goal Statement: Patient will continue Outpatient Physical Therapy or Home Care Physical Therapy PRN; Group Home with use EZ lift to keep patient mobile/up  Barriers: Deconditioned   Strengths:Supportive parents   Patient/caregiver expressed understanding of goal: Yes  Action steps to achieve this goal:  1. I/caregiver will keep future appointments  2. I/caregiver will discuss, review, schedule and complete recommended overdue health maintenance with my primary care provider  3.  I/caregiver will contact my care team with questions, concerns, support needs   4. I/caregiver will use the clinic as a resource, and I understand I can contact my clinic with 24/7 after hours services available                             Intervention/Education provided during outreach:   RNCC called and spoke with patient/caregiver; introduced self, discussed role of Care Coordination and explained reason for call    Plan:   -Patient will contact the care team with questions, concerns, support needs   -Patient will use the clinic as a resource and understands (s)he can contact the [facility] with 24/7 after hours services available  -Care Coordinator will remain available as needed  -RNCC will follow up in one month for follow up appointments/recommendations and goal progression     Candace Perez RN, BSN, PHN  Primary Care / Care Coordinator   [facility]  E-mail [email or web address]   [phone]

## 2023-10-27 ENCOUNTER — TELEPHONE (OUTPATIENT)
Dept: INTERNAL MEDICINE | Facility: CLINIC | Age: 43
End: 2023-10-27

## 2023-10-27 DIAGNOSIS — S06.2X9D DIFFUSE TRAUMATIC BRAIN INJURY WITH LOSS OF CONSCIOUSNESS OF UNSPECIFIED DURATION, SUBSEQUENT ENCOUNTER: Primary | ICD-10-CM

## 2023-10-27 NOTE — TELEPHONE ENCOUNTER
Per Care Coordinator, we need an updated referral since pt hasn't seen his PCP within 90 days of the initial referral being placed.     New referral pended for review, routing to PCP for approval.     Thank you,  Yareli Anderson RN

## 2023-10-27 NOTE — TELEPHONE ENCOUNTER
Per chart review, pt has an active homecare referral placed in May 2023 for PT, specifically noted for EZ stand education.     Routing to Care Coordinator to advise.   Is anything needed further at this time?           Hello,   This patient needs a home care referral for home Physical Therapy for patient/staff education/demonstration on how to use the EZ stand.   Thank you.   Candace Perez RN, BSN, PHN   Primary Care / Care Coordinator   [facility]   E-mail [email or web address]   [phone]

## 2023-10-30 ENCOUNTER — MEDICAL CORRESPONDENCE (OUTPATIENT)
Dept: HEALTH INFORMATION MANAGEMENT | Facility: CLINIC | Age: 43
End: 2023-10-30

## 2023-11-07 ENCOUNTER — PATIENT OUTREACH (OUTPATIENT)
Dept: CARE COORDINATION | Facility: CLINIC | Age: 43
End: 2023-11-07
Payer: COMMERCIAL

## 2023-11-07 NOTE — PROGRESS NOTES
Clinic Care Coordination Contact  Follow Up Progress Note      Assessment:   RNCC attempting to find home care for EZ stand education to the Group Home staff where patient resides.  [facility], [facility], [facility] home care agencies have denied referral due to insurance.  RNCC reached out to [facility] as patient had [facility] last year and patient needs a video visit/face to face with Primary Care Provider as the patient has not seen the Primary Care Provider since May 2023.    RNCC reached out to Gerald, patients father and legal guardian; left message with the above information and contact information to return call.  RNCC will wait to hear from Gerald.    Care Gaps:    Health Maintenance Due   Topic Date Due    HF ACTION PLAN  Never done    HEPATITIS B IMMUNIZATION (1 of 3 - 3-dose series) Never done    Pneumococcal Vaccine: Pediatrics (0 to 5 Years) and At-Risk Patients (6 to 64 Years) (2 - PCV) 10/13/2021    BMP  05/17/2023    INFLUENZA VACCINE (1) 09/01/2023    COVID-19 Vaccine (5 - 2023-24 season) 09/01/2023    PHQ-9  10/03/2023    YEARLY PREVENTIVE VISIT  11/17/2023    ALT  11/17/2023    LIPID  11/17/2023       Care Plans  Care Plan: Physical Therapy       Problem: Lifestyle choices       Goal: Functional       Start Date: 8/20/2021 Expected End Date: 1/11/2024    This Visit's Progress: 90% Recent Progress: 90%    Note:     Goal Statement: Patient will continue Outpatient Physical Therapy or Home Care Physical Therapy PRN; Group Home with use EZ lift to keep patient mobile/up  Barriers: Deconditioned   Strengths:Supportive parents   Patient/caregiver expressed understanding of goal: Yes  Action steps to achieve this goal:  1. I/caregiver will keep future appointments  2. I/caregiver will discuss, review, schedule and complete recommended overdue health maintenance with my primary care provider  3. I/caregiver will contact my care team with questions, concerns, support needs   4. I/caregiver will  use the clinic as a resource, and I understand I can contact my clinic with 24/7 after hours services available                             Intervention/Education provided during outreach:   RNCC called and spoke with patient/caregiver; introduced self, discussed role of Care Coordination and explained reason for call    Plan:   -Patient will contact the care team with questions, concerns, support needs   -Patient will use the clinic as a resource and understands (s)he can contact the [facility] with 24/7 after hours services available  -Care Coordinator will remain available as needed  -RNCC will follow up in one month for follow up appointments/recommendations and goal progression     Candace Perez RN, BSN, PHN  Primary Care / Care Coordinator   [facility]  E-mail [email or web address]   [phone]

## 2023-11-09 ENCOUNTER — IMMUNIZATION (OUTPATIENT)
Dept: INTERNAL MEDICINE | Facility: CLINIC | Age: 43
End: 2023-11-09
Payer: COMMERCIAL

## 2023-11-09 PROCEDURE — 90480 ADMN SARSCOV2 VAC 1/ONLY CMP: CPT

## 2023-11-09 PROCEDURE — 90471 IMMUNIZATION ADMIN: CPT

## 2023-11-09 PROCEDURE — 90686 IIV4 VACC NO PRSV 0.5 ML IM: CPT

## 2023-11-09 PROCEDURE — 91320 SARSCV2 VAC 30MCG TRS-SUC IM: CPT

## 2023-11-20 ENCOUNTER — TELEPHONE (OUTPATIENT)
Dept: INTERVENTIONAL RADIOLOGY/VASCULAR | Facility: HOSPITAL | Age: 43
End: 2023-11-20
Payer: COMMERCIAL

## 2023-11-20 ENCOUNTER — MEDICAL CORRESPONDENCE (OUTPATIENT)
Dept: HEALTH INFORMATION MANAGEMENT | Facility: CLINIC | Age: 43
End: 2023-11-20
Payer: COMMERCIAL

## 2023-11-20 NOTE — TELEPHONE ENCOUNTER
I received a message that Scot had an issue with his G-tube. Group home was called and they confirmed that Scot's G-tube had fallen out on Saturday. The tube had been placed back in the tract by the group home and they were hoping for a replacement soon.    NP Pooja Ellingson called and entered order for replacement. IR schedulers will call group [facility].

## 2023-11-21 ENCOUNTER — TELEPHONE (OUTPATIENT)
Dept: INTERNAL MEDICINE | Facility: CLINIC | Age: 43
End: 2023-11-21
Payer: COMMERCIAL

## 2023-11-21 ENCOUNTER — HOSPITAL ENCOUNTER (OUTPATIENT)
Dept: RADIOLOGY | Facility: CLINIC | Age: 43
Discharge: HOME | End: 2023-11-21
Attending: NURSE PRACTITIONER | Admitting: NURSE PRACTITIONER
Payer: COMMERCIAL

## 2023-11-21 DIAGNOSIS — R63.30 FEEDING DIFFICULTIES: ICD-10-CM

## 2023-11-21 PROCEDURE — 49450 REPLACE G/C TUBE PERC: CPT

## 2023-11-21 NOTE — TELEPHONE ENCOUNTER
The radiologist who was trying to do the G tube exam today wants to speak with Dr. Morales directly to go over what happened and see if it is necessary to place one. Please call him back at: [phone].

## 2023-11-21 NOTE — TELEPHONE ENCOUNTER
Huddled with Dr. Morales, he tried calling 3 times and was unable to leave a VM on that number. RN triage to try to call again in the morning and get more information or try to connect providers.

## 2023-11-22 DIAGNOSIS — L98.9 SKIN DISORDER: ICD-10-CM

## 2023-11-22 DIAGNOSIS — Z93.4 GASTROJEJUNOSTOMY TUBE STATUS (H): Primary | ICD-10-CM

## 2023-11-22 DIAGNOSIS — G93.1 ANOXIC BRAIN DAMAGE (H): ICD-10-CM

## 2023-11-22 RX ORDER — BACITRACIN ZINC, NEOMYCIN SULFATE, AND POLYMYXIN B SULFATE 400; 3.5; 5 [IU]/G; MG/G; [IU]/G
OINTMENT TOPICAL
Qty: 28.4 G | Refills: 1 | Status: SHIPPED | OUTPATIENT
Start: 2023-11-22

## 2023-11-22 RX ORDER — NYSTATIN 100000 U/G
CREAM TOPICAL
Qty: 15 G | Refills: 1 | Status: SHIPPED | OUTPATIENT
Start: 2023-11-22

## 2023-11-22 RX ORDER — MINERAL OIL/HYDROPHIL PETROLAT
OINTMENT (GRAM) TOPICAL
Qty: 396 G | Refills: 0 | Status: SHIPPED | OUTPATIENT
Start: 2023-11-22

## 2023-11-22 NOTE — TELEPHONE ENCOUNTER
Prescription approved per [facility] Refill Protocol.  Elizabeth Marie, RN  [facility] Triage Nurse

## 2023-11-22 NOTE — TELEPHONE ENCOUNTER
Discussed with radiologist who apparently states that the GI tract for which the G-tube was placed has completely healed over and thus the decision was made to not replace.  It appears that the tube was in place primarily for hydration.  Would contact patient or family and suggest that as long as patient is able to maintain oral intake and hydration status that we can probably get by without the G-tube but if not this will need to be rescheduled through interventional radiology for new G-tube.

## 2023-11-22 NOTE — TELEPHONE ENCOUNTER
Patient Contact     Pt father, Pilo calling in returning call he received earlier. Relayed providers message, see below. He states he thinks pt can maintain hydration status and oral intake without a G-tube but he states the facility might not agree. Pt has appt with PCP next week and will discuss further. Father was in agreement to this plan and had no further questions.     Ramila Cotto RN

## 2023-11-22 NOTE — TELEPHONE ENCOUNTER
Called and spoke with radiology. Per radiology, Dr Vera can be reached at [phone]. If Dr Vera doesn't answer that #, please call [phone] and they can help connect to Dr Vera.     Routing to PCP.

## 2023-11-22 NOTE — TELEPHONE ENCOUNTER
Patient Contact    Attempt # 1 to Group Home Coordinator    Was call answered?  No.  Left message on voicemail with information to call me back.      Patient Contact    Attempt # 1 to Legal Guardian, Father    Was call answered?  No.  Left message on voicemail with information to call me back.

## 2023-11-24 DIAGNOSIS — K59.00 CONSTIPATION, UNSPECIFIED CONSTIPATION TYPE: ICD-10-CM

## 2023-11-24 DIAGNOSIS — I46.9 CARDIAC ARREST (H): ICD-10-CM

## 2023-11-24 RX ORDER — DOCUSATE SODIUM 100 MG/1
100 CAPSULE, LIQUID FILLED ORAL 2 TIMES DAILY PRN
Qty: 180 CAPSULE | Refills: 0 | Status: SHIPPED | OUTPATIENT
Start: 2023-11-24

## 2023-11-24 RX ORDER — ACETAMINOPHEN 325 MG/1
325 TABLET ORAL EVERY 6 HOURS PRN
Qty: 90 TABLET | Refills: 0 | Status: SHIPPED | OUTPATIENT
Start: 2023-11-24

## 2023-11-29 ENCOUNTER — OFFICE VISIT (OUTPATIENT)
Dept: INTERNAL MEDICINE | Facility: CLINIC | Age: 43
End: 2023-11-29
Payer: COMMERCIAL

## 2023-11-29 VITALS — TEMPERATURE: 98 F | SYSTOLIC BLOOD PRESSURE: 100 MMHG | DIASTOLIC BLOOD PRESSURE: 74 MMHG

## 2023-11-29 DIAGNOSIS — S06.2X9D DIFFUSE TRAUMATIC BRAIN INJURY WITH LOSS OF CONSCIOUSNESS OF UNSPECIFIED DURATION, SUBSEQUENT ENCOUNTER: Primary | ICD-10-CM

## 2023-11-29 DIAGNOSIS — Z13.6 CARDIOVASCULAR SCREENING; LDL GOAL LESS THAN 100: ICD-10-CM

## 2023-11-29 LAB
ANION GAP SERPL CALCULATED.3IONS-SCNC: 11 MMOL/L (ref 7–15)
BUN SERPL-MCNC: 14.3 MG/DL (ref 6–20)
CALCIUM SERPL-MCNC: 9.4 MG/DL (ref 8.6–10)
CHLORIDE SERPL-SCNC: 102 MMOL/L (ref 98–107)
CREAT SERPL-MCNC: 0.77 MG/DL (ref 0.67–1.17)
DEPRECATED HCO3 PLAS-SCNC: 27 MMOL/L (ref 22–29)
EGFRCR SERPLBLD CKD-EPI 2021: >90 ML/MIN/1.73M2
GLUCOSE SERPL-MCNC: 88 MG/DL (ref 70–99)
POTASSIUM SERPL-SCNC: 4.2 MMOL/L (ref 3.4–5.3)
SODIUM SERPL-SCNC: 140 MMOL/L (ref 135–145)

## 2023-11-29 PROCEDURE — 99214 OFFICE O/P EST MOD 30 MIN: CPT | Performed by: INTERNAL MEDICINE

## 2023-11-29 PROCEDURE — 80048 BASIC METABOLIC PNL TOTAL CA: CPT | Performed by: INTERNAL MEDICINE

## 2023-11-29 PROCEDURE — 36415 COLL VENOUS BLD VENIPUNCTURE: CPT | Performed by: INTERNAL MEDICINE

## 2023-11-29 NOTE — PROGRESS NOTES
Assessment & Plan     Diffuse traumatic brain injury with loss of consciousness of unspecified duration, subsequent encounter  Is at baseline.  Continue with supportive care.  New referral placed  For home health care.    I received a message from home health care the due to patient's insurance [facility] health care apparently does not cover [employer].  In addition no other healthcare home care will see patient.  I will send a care coordination referral to see if can help assist.    Severe hypoxic-ischemic encephalopathy (H28)  Noted as baseline.  Continuing with ongoing support.    CARDIOVASCULAR SCREENING; LDL GOAL LESS THAN 100  Discussed rechecking lipid panel which patient will do at his upcoming annual exam     See Patient Instructions    Cachorro Morales MD  [facility]    Carlos Ellison is a 43 year old, presenting for the following health issues:  Orders (Home care- Physical therapy, OT therapy )      MAURA Ellison is a 43 y.o. male with history of severe anoxic brain injury in May of 2020 from cardiac arrest (s/p successful aspiration thrombectomy and PCI w/ NAZIA of proximal and mid LAD) ischemic cardiomyopathy . He had severe anoxic brain injury related to cardiac arrest. This has lead to global dysfunction and patient is currently non-verbal and dependent for cares and mobility. Currently living at group home.  Recently seen by interventional radiology to review place the G-tube the G-tube site was healed over last the procedure was canceled.  He currently is tolerating oral intake.  The G-tube was primarily in place for issues of ensuring adequate hydration in the past.  He is accompanied today by his family member as per baseline.      Review of Systems   CONSTITUTIONAL: NEGATIVE for fever, chills, change in weight  EYES: NEGATIVE for vision changes or irritation  ENT/MOUTH: NEGATIVE for ear, mouth and throat problems  RESP: NEGATIVE for significant cough or SOB  CV:  NEGATIVE for chest pain, palpitations or peripheral edema  GI: NEGATIVE for nausea, abdominal pain, heartburn, or change in bowel habits  : NEGATIVE for frequency, dysuria, or hematuria  HEME: NEGATIVE for bleeding problems      Objective    /74   Temp 98  F (36.7  C) (Temporal)   There is no height or weight on file to calculate BMI.  Physical Exam   GENERAL: alert and no distress in a wheelchair  EYES: Eyes grossly normal to inspection, PERRL and conjunctivae and sclerae normal  HENT: ear canals and TM's normal, nose and mouth without ulcers or lesions  NECK: no adenopathy, no asymmetry, masses, or scars and thyroid normal to palpation  RESP: lungs clear to auscultation - no rales, rhonchi or wheezes  CV: regular rate and rhythm, normal S1 S2, no S3 or S4, no murmur, click or rub, no peripheral edema and peripheral pulses strong  Healed G-tube site.  NEURO: Baseline anoxic changes noted.  Patient is nonverbal.  PSYCH: mentation baseline    Creatinine   Date Value Ref Range Status   11/17/2022 0.87 0.67 - 1.17 mg/dL Final   06/25/2020 0.76 0.66 - 1.25 mg/dL Final

## 2023-12-01 ENCOUNTER — PATIENT OUTREACH (OUTPATIENT)
Dept: CARE COORDINATION | Facility: CLINIC | Age: 43
End: 2023-12-01
Payer: COMMERCIAL

## 2023-12-01 NOTE — PROGRESS NOTES
Clinic Care Coordination Contact  Follow Up Progress Note      Assessment:   RNCC was not successful reaching Gerald, father, legal guardian, and left a voice mail with contact information to return the call.  Also left a message for Gerald to call insurance ([facility]) to see what home care agency they contract due to no success securing a home care agency due to insurance.  RNCC will wait for a return call.    Care Gaps:    Health Maintenance Due   Topic Date Due    HF ACTION PLAN  Never done    HEPATITIS B IMMUNIZATION (1 of 3 - 3-dose series) Never done    Pneumococcal Vaccine: Pediatrics (0 to 5 Years) and At-Risk Patients (6 to 64 Years) (2 - PCV) 10/13/2021    PHQ-9  10/03/2023    ALT  11/17/2023    LIPID  11/17/2023    MEDICARE ANNUAL WELLNESS VISIT  11/17/2023       Postponed to next RNCC outreach     Care Plans  Care Plan: Physical Therapy       Problem: Lifestyle choices       Goal: Functional       Start Date: 8/20/2021 Expected End Date: 1/11/2024    This Visit's Progress: 90% Recent Progress: 90%    Note:     Goal Statement: Patient will continue Outpatient Physical Therapy or Home Care Physical Therapy PRN; Group Home with use EZ lift to keep patient mobile/up  Barriers: Deconditioned   Strengths:Supportive parents   Patient/caregiver expressed understanding of goal: Yes  Action steps to achieve this goal:  1. I/caregiver will keep future appointments  2. I/caregiver will discuss, review, schedule and complete recommended overdue health maintenance with my primary care provider  3. I/caregiver will contact my care team with questions, concerns, support needs   4. I/caregiver will use the clinic as a resource, and I understand I can contact my clinic with 24/7 after hours services available                             Intervention/Education provided during outreach:   RNCC called and spoke with patient/caregiver; introduced self, discussed role of Care Coordination and explained reason for  call    Plan:   -Patient will contact the care team with questions, concerns, support needs   -Patient will use the clinic as a resource and understands (s)he can contact the [facility] with 24/7 after hours services available  -Care Coordinator will remain available as needed  -RNCC will follow up in one month for follow up appointments/recommendations and goal progression     Candace Perez RN, BSN, PHN  Primary Care / Care Coordinator   [facility]  E-mail [email or web address]   [phone]

## 2023-12-04 ENCOUNTER — TELEPHONE (OUTPATIENT)
Dept: INTERNAL MEDICINE | Facility: CLINIC | Age: 43
End: 2023-12-04
Payer: COMMERCIAL

## 2023-12-04 NOTE — TELEPHONE ENCOUNTER
Home Care is calling regarding an established patient with [facility].       Requesting orders from: Cachorro Morales  Provider is following patient: No       Orders Requested    Skilled Nursing  Request for delay in care   Delay start to date: 12/5 due to Pt request     Caryn asked this be sent HP to PCP.     Confirmed ok to leave a detailed message with call back.  Contact information confirmed and updated as needed.    Racquel Pimentel RN

## 2023-12-05 ENCOUNTER — MEDICAL CORRESPONDENCE (OUTPATIENT)
Dept: HEALTH INFORMATION MANAGEMENT | Facility: CLINIC | Age: 43
End: 2023-12-05
Payer: COMMERCIAL

## 2023-12-06 ENCOUNTER — MEDICAL CORRESPONDENCE (OUTPATIENT)
Dept: HEALTH INFORMATION MANAGEMENT | Facility: CLINIC | Age: 43
End: 2023-12-06
Payer: COMMERCIAL

## 2023-12-17 ENCOUNTER — HEALTH MAINTENANCE LETTER (OUTPATIENT)
Age: 43
End: 2023-12-17

## 2023-12-18 ENCOUNTER — MEDICAL CORRESPONDENCE (OUTPATIENT)
Dept: HEALTH INFORMATION MANAGEMENT | Facility: CLINIC | Age: 43
End: 2023-12-18
Payer: COMMERCIAL

## 2023-12-21 NOTE — TELEPHONE ENCOUNTER
Approved order request.    If asymptomatic in regards to blood pressure suggest doing the best they can to encourage aggressive hydration and follow blood pressure accordingly.  Certainly should contact us if patient becomes symptomatic.

## 2023-12-21 NOTE — TELEPHONE ENCOUNTER
Called, but received VM for Desi.     Called and spoke with OT Supervisor to relay BP note (and approval of orders).   She stated that the pt's BP has been running low according to their records.     She stated she will reach out to Desi and have her contact the clinic back if the pt is symptomatic, and if not, they will encourage fluids per provider recommendation.     Thank you,  Yareli Anderson RN

## 2023-12-21 NOTE — TELEPHONE ENCOUNTER
Home Care is calling regarding an established patient with [facility].       Requesting orders from: Cachorro Morales  Provider is following patient: Yes  Is this a 60-day recertification request?  No    Orders Requested    Occupational Therapy  Request for initial certification (first set of orders)   Frequency:  effective 1/6 1 visit 1 time     She is also wanting to report a low bp 89/67 today he is non verbal staff say he is presenting normally   Pulse was 89   Temp 97.5   Patient refused O2 check     Confirmed ok to leave a detailed message with call back.  Contact information confirmed and updated as needed.    Eladio Sullivan RN

## 2023-12-22 ENCOUNTER — MEDICAL CORRESPONDENCE (OUTPATIENT)
Dept: HEALTH INFORMATION MANAGEMENT | Facility: CLINIC | Age: 43
End: 2023-12-22
Payer: COMMERCIAL

## 2023-12-22 NOTE — TELEPHONE ENCOUNTER
Home Care is calling regarding an established patient with [facility].       Requesting orders from: Cachorro Morales  Provider is following patient: Yes  Is this a 60-day recertification request?  No    Orders Requested    Physical Therapy  Request for continuation of care with no increase or decrease in frequency  Frequency:  1x/wk for 1 wks        Verbal orders given.  Home Care will send orders for provider to sign.  Confirmed ok to leave a detailed message with call back.  Contact information confirmed and updated as needed.    Yareli Anderson RN

## 2023-12-27 ENCOUNTER — MEDICAL CORRESPONDENCE (OUTPATIENT)
Dept: HEALTH INFORMATION MANAGEMENT | Facility: CLINIC | Age: 43
End: 2023-12-27
Payer: COMMERCIAL

## 2024-01-03 ENCOUNTER — OFFICE VISIT (OUTPATIENT)
Dept: INTERNAL MEDICINE | Facility: CLINIC | Age: 44
End: 2024-01-03
Payer: COMMERCIAL

## 2024-01-03 VITALS
TEMPERATURE: 96.8 F | HEART RATE: 78 BPM | RESPIRATION RATE: 16 BRPM | DIASTOLIC BLOOD PRESSURE: 59 MMHG | SYSTOLIC BLOOD PRESSURE: 90 MMHG

## 2024-01-03 DIAGNOSIS — S06.2X9D DIFFUSE TRAUMATIC BRAIN INJURY WITH LOSS OF CONSCIOUSNESS OF UNSPECIFIED DURATION, SUBSEQUENT ENCOUNTER: ICD-10-CM

## 2024-01-03 DIAGNOSIS — T88.7XXA MEDICATION SIDE EFFECTS: ICD-10-CM

## 2024-01-03 DIAGNOSIS — Z00.00 ENCOUNTER FOR MEDICARE ANNUAL WELLNESS EXAM: Primary | ICD-10-CM

## 2024-01-03 DIAGNOSIS — Z13.6 CARDIOVASCULAR SCREENING; LDL GOAL LESS THAN 100: ICD-10-CM

## 2024-01-03 LAB
ALBUMIN SERPL BCG-MCNC: 4.6 G/DL (ref 3.5–5.2)
ALP SERPL-CCNC: 82 U/L (ref 40–150)
ALT SERPL W P-5'-P-CCNC: 36 U/L (ref 0–70)
AMMONIA PLAS-SCNC: 21 UMOL/L (ref 16–60)
ANION GAP SERPL CALCULATED.3IONS-SCNC: 11 MMOL/L (ref 7–15)
AST SERPL W P-5'-P-CCNC: 20 U/L (ref 0–45)
BILIRUB DIRECT SERPL-MCNC: <0.2 MG/DL (ref 0–0.3)
BILIRUB SERPL-MCNC: 0.4 MG/DL
BUN SERPL-MCNC: 15.5 MG/DL (ref 6–20)
CALCIUM SERPL-MCNC: 9.7 MG/DL (ref 8.6–10)
CHLORIDE SERPL-SCNC: 107 MMOL/L (ref 98–107)
CHOLEST SERPL-MCNC: 122 MG/DL
CREAT SERPL-MCNC: 0.88 MG/DL (ref 0.67–1.17)
DEPRECATED HCO3 PLAS-SCNC: 27 MMOL/L (ref 22–29)
EGFRCR SERPLBLD CKD-EPI 2021: >90 ML/MIN/1.73M2
FASTING STATUS PATIENT QL REPORTED: YES
GLUCOSE SERPL-MCNC: 88 MG/DL (ref 70–99)
HDLC SERPL-MCNC: 29 MG/DL
LDLC SERPL CALC-MCNC: 51 MG/DL
NONHDLC SERPL-MCNC: 93 MG/DL
POTASSIUM SERPL-SCNC: 5 MMOL/L (ref 3.4–5.3)
PROT SERPL-MCNC: 8 G/DL (ref 6.4–8.3)
SODIUM SERPL-SCNC: 145 MMOL/L (ref 135–145)
TRIGL SERPL-MCNC: 210 MG/DL

## 2024-01-03 PROCEDURE — G0009 ADMIN PNEUMOCOCCAL VACCINE: HCPCS | Performed by: INTERNAL MEDICINE

## 2024-01-03 PROCEDURE — 80053 COMPREHEN METABOLIC PANEL: CPT | Performed by: INTERNAL MEDICINE

## 2024-01-03 PROCEDURE — 90677 PCV20 VACCINE IM: CPT | Performed by: INTERNAL MEDICINE

## 2024-01-03 PROCEDURE — 80061 LIPID PANEL: CPT | Performed by: INTERNAL MEDICINE

## 2024-01-03 PROCEDURE — 82248 BILIRUBIN DIRECT: CPT | Performed by: INTERNAL MEDICINE

## 2024-01-03 PROCEDURE — 36415 COLL VENOUS BLD VENIPUNCTURE: CPT | Performed by: INTERNAL MEDICINE

## 2024-01-03 PROCEDURE — 99207 PR ANNUAL WELLNESS VISIT, PPS, SUBSEQUENT STAT: CPT | Performed by: INTERNAL MEDICINE

## 2024-01-03 PROCEDURE — 82140 ASSAY OF AMMONIA: CPT | Performed by: INTERNAL MEDICINE

## 2024-01-03 ASSESSMENT — ENCOUNTER SYMPTOMS
HEMATURIA: 0
NERVOUS/ANXIOUS: 0
WEAKNESS: 0
MYALGIAS: 0
DYSURIA: 0
CONSTIPATION: 0
JOINT SWELLING: 0
SORE THROAT: 0
HEARTBURN: 0
HEADACHES: 0
NAUSEA: 0
COUGH: 0
PARESTHESIAS: 0
DIZZINESS: 0
ARTHRALGIAS: 0
FREQUENCY: 0
EYE PAIN: 0
DIARRHEA: 0
ABDOMINAL PAIN: 0
SHORTNESS OF BREATH: 0
PALPITATIONS: 0
FEVER: 0
HEMATOCHEZIA: 0
CHILLS: 0

## 2024-01-03 ASSESSMENT — ACTIVITIES OF DAILY LIVING (ADL)
CURRENT_FUNCTION: BATHING REQUIRES ASSISTANCE
CURRENT_FUNCTION: LAUNDRY REQUIRES ASSISTANCE
CURRENT_FUNCTION: TELEPHONE REQUIRES ASSISTANCE
CURRENT_FUNCTION: MONEY MANAGEMENT REQUIRES ASSISTANCE
CURRENT_FUNCTION: HOUSEWORK REQUIRES ASSISTANCE
CURRENT_FUNCTION: TRANSPORTATION REQUIRES ASSISTANCE
CURRENT_FUNCTION: MEDICATION ADMINISTRATION REQUIRES ASSISTANCE
CURRENT_FUNCTION: PREPARING MEALS REQUIRES ASSISTANCE
CURRENT_FUNCTION: SHOPPING REQUIRES ASSISTANCE

## 2024-01-03 ASSESSMENT — PATIENT HEALTH QUESTIONNAIRE - PHQ9
SUM OF ALL RESPONSES TO PHQ QUESTIONS 1-9: 3
SUM OF ALL RESPONSES TO PHQ QUESTIONS 1-9: 3
10. IF YOU CHECKED OFF ANY PROBLEMS, HOW DIFFICULT HAVE THESE PROBLEMS MADE IT FOR YOU TO DO YOUR WORK, TAKE CARE OF THINGS AT HOME, OR GET ALONG WITH OTHER PEOPLE: NOT DIFFICULT AT ALL

## 2024-01-03 NOTE — PROGRESS NOTES
"SUBJECTIVE:   Scot is a 43 year old, presenting for the following:  Wellness Visit    Are you in the first 12 months of your Medicare coverage?  No    Healthy Habits:     In general, how would you rate your overall health?  Fair    Frequency of exercise:  None    Do you usually eat at least 4 servings of fruit and vegetables a day, include whole grains    & fiber and avoid regularly eating high fat or \"junk\" foods?  Yes    Taking medications regularly:  Yes    Medication side effects:  None    Ability to successfully perform activities of daily living:  Telephone requires assistance, transportation requires assistance, shopping requires assistance, preparing meals requires assistance, housework requires assistance, bathing requires assistance, laundry requires assistance, medication administration requires assistance and money management requires assistance    Home Safety:  No safety concerns identified    Hearing Impairment:  No hearing concerns    In the past 6 months, have you been bothered by leaking of urine?  No    In general, how would you rate your overall mental or emotional health?  Fair      Have you ever done Advance Care Planning? (For example, a Health Directive, POLST, or a discussion with a medical provider or your loved ones about your wishes): Yes, advance care planning is on file.       Fall risk       Cognitive Screening Not appropriate due to mental handicap    Reviewed and updated as needed this visit by clinical staff     Meds              Reviewed and updated as needed this visit by Provider                 Social History     Tobacco Use    Smoking status: Former     Packs/day: 0.00     Years: 0.00     Additional pack years: 0.00     Total pack years: 0.00     Types: Cigarettes     Start date: 11/1/1995     Quit date: 5/17/2020     Years since quitting: 3.6    Smokeless tobacco: Never   Substance Use Topics    Alcohol use: Not Currently           1/3/2024     9:59 AM   Alcohol Use "   Prescreen: >3 drinks/day or >7 drinks/week? No     Do you have a current opioid prescription? No  Do you use any other controlled substances or medications that are not prescribed by a provider? None    PROBLEMS TO ADD ON...  Patient no longer on feeding tube. Patient family questioning if kidney function should be checked   Group home needing new orders for gloves, incontinence supplies and wipes   Needing new home care orders     Current providers sharing in care for this patient include:   Patient Care Team:  Cachorro Morales MD as PCP - General (Internal Medicine)  Scot Matamoros MD as MD (Cardiology)  Cachorro Morales MD as Assigned PCP  EVELINA Myles CM as   José Miguel, group home  as   Melanie, financial worker as Financial Resource Worker  Elsa Rahman NP as Assigned Behavioral Health Provider  Candace Perez RN as Lead Care Coordinator  Emelina Gama PA-C as Physician Assistant (Cardiovascular Disease)  Emelina Gama PA-C as Assigned Heart and Vascular Provider    The following health maintenance items are reviewed in Epic and correct as of today:  Health Maintenance   Topic Date Due    HF ACTION PLAN  Never done    HEPATITIS B IMMUNIZATION (1 of 3 - 3-dose series) Never done    Pneumococcal Vaccine: Pediatrics (0 to 5 Years) and At-Risk Patients (6 to 64 Years) (2 of 2 - PCV) 10/13/2021    MEDICARE ANNUAL WELLNESS VISIT  11/17/2023    ALT  11/17/2023    LIPID  11/17/2023    ANNUAL REVIEW OF HM ORDERS  11/17/2023    CBC  05/17/2024    BMP  05/29/2024    PHQ-9  07/03/2024    DTAP/TDAP/TD IMMUNIZATION (7 - Td or Tdap) 08/21/2024    ADVANCE CARE PLANNING  01/03/2029    TSH W/FREE T4 REFLEX  Completed    HEPATITIS C SCREENING  Completed    HIV SCREENING  Completed    DEPRESSION ACTION PLAN  Completed    INFLUENZA VACCINE  Completed    IPV IMMUNIZATION  Completed    COVID-19 Vaccine  Completed    HPV IMMUNIZATION  Aged Out     "MENINGITIS IMMUNIZATION  Aged Out    RSV MONOCLONAL ANTIBODY  Aged Out     Current Outpatient Medications   Medication    acetaminophen (TYLENOL) 325 MG tablet    aspirin (ASPIRIN LOW DOSE) 81 MG chewable tablet    atorvastatin (LIPITOR) 40 MG tablet    divalproex sodium delayed-release (DEPAKOTE) 125 MG DR tablet    docusate sodium (COLACE) 100 MG capsule    LORazepam (ATIVAN) 0.5 MG tablet    midodrine (PROAMATINE) 2.5 MG tablet    mineral oil-hydrophilic petrolatum (AQUAPHOR) external ointment    Neomycin-Bacitracin-Polymyxin (SM TRIPLE ANTIBIOTIC ORIGINAL) 3.5-400-5000 OINT    nystatin (MYCOSTATIN) 584281 UNIT/GM external cream    PARoxetine (PAXIL) 30 MG tablet    QUEtiapine (SEROQUEL) 25 MG tablet     No current facility-administered medications for this visit.       Review of Systems   Constitutional:  Negative for chills and fever.   HENT:  Negative for congestion, ear pain, hearing loss and sore throat.    Eyes:  Negative for pain and visual disturbance.   Respiratory:  Negative for cough and shortness of breath.    Cardiovascular:  Negative for chest pain, palpitations and peripheral edema.   Gastrointestinal:  Negative for abdominal pain, constipation, diarrhea, heartburn, hematochezia and nausea.   Genitourinary:  Negative for dysuria, frequency, genital sores, hematuria, impotence, penile discharge and urgency.   Musculoskeletal:  Negative for arthralgias, joint swelling and myalgias.   Skin:  Negative for rash.   Neurological:  Negative for dizziness, weakness, headaches and paresthesias.   Psychiatric/Behavioral:  Negative for mood changes. The patient is not nervous/anxious.        OBJECTIVE:   BP 90/59   Pulse 78   Temp 96.8  F (36  C) (Temporal)   Resp 16  Estimated body mass index is 23.87 kg/m  as calculated from the following:    Height as of 5/9/23: 1.727 m (5' 8\").    Weight as of 5/9/23: 71.2 kg (157 lb).    Physical Exam  GENERAL: alert and no distress  EYES: Eyes grossly normal to " inspection, PERRL and conjunctivae and sclerae normal  HENT: ear canals and TM's normal, nose and mouth without ulcers or lesions  RESP: lungs clear to auscultation - no rales, rhonchi or wheezes  CV: regular rate and rhythm, normal S1 S2, no S3 or S4, no murmur, click or rub  Catheter in place.  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, subsequent encounter  Comment: ordered HHC referral as well as DME per request  Plan: Miscellaneous Order for DME - ONLY FOR DME,         Incontinence Supplies Order for DME - ONLY FOR         DME, Miscellaneous Order for DME - ONLY FOR         DME, Miscellaneous Order for DME - ONLY FOR         DME, Home Care Referral              (Z13.6) CARDIOVASCULAR SCREENING; LDL GOAL LESS THAN 100  Comment: labs ordered as fasting  Plan:       Patient has been advised of split billing requirements and indicates understanding: Yes      COUNSELING:  Reviewed preventive health counseling, as reflected in patient instructions       Regular exercise       Healthy diet/nutrition      He reports that he quit smoking about 3 years ago. His smoking use included cigarettes. He started smoking about 28 years ago. He has never used smokeless tobacco.      Appropriate preventive services were discussed with this patient, including applicable screening as appropriate for fall prevention, nutrition, physical activity, Tobacco-use  cessation, weight loss and cognition.  Checklist reviewing preventive services available has been given to the patient.    Reviewed patients plan of care and provided an AVS. The Basic Care Plan (routine screening as documented in Health Maintenance) for Scot meets the Care Plan requirement. This Care Plan has been established and reviewed with the Patient.        Cachorro Morales MD  [facility]    Identified Health Risks:  I have reviewed Opioid Use Disorder and Substance Use Disorder risk factors and made any needed referrals.   Answers submitted by the patient for this visit:  Patient Health Questionnaire (Submitted on 1/3/2024)  If you checked off any problems, how difficult have these problems made it for you to do your work, take care of things at home, or get along with other people?: Not difficult at all  PHQ9 TOTAL SCORE: 3

## 2024-01-03 NOTE — LETTER
[facility]  [address] [address]  [phone]      1/4/2024       Scot Bishop  [address] [address]        Dear Scot,    Your total cholesterol as well as your LDL or bad cholesterol levels are essentially stable.  Your triglyceride level is just a little bit high this may be related to your dietary intake.  Medications are available if you really wanted to be proactive in treatment of your triglyceride level although more commonly dietary restriction is recommended.    Your basic panel including your routine electrolytes and kidney function tests have returned normal.    I did try to send you a message by [email or web address] but for some reason it is not available.    Sincerely,      Cachorro oMrales MD  Internal Medicine

## 2024-01-03 NOTE — PATIENT INSTRUCTIONS
Patient Education   Personalized Prevention Plan  You are due for the preventive services outlined below.  Your care team is available to assist you in scheduling these services.  If you have already completed any of these items, please share that information with your care team to update in your medical record.  Health Maintenance Due   Topic Date Due     Heart Failure Action Plan  Never done     Hepatitis B Vaccine (1 of 3 - 3-dose series) Never done     Pneumococcal Vaccine (2 of 2 - PCV) 10/13/2021     Annual Wellness Visit  11/17/2023     Liver Monitoring Lab  11/17/2023     Cholesterol Lab  11/17/2023     ANNUAL REVIEW OF HM ORDERS  11/17/2023     Your Health Risk Assessment indicates you feel you are not in good health    A healthy lifestyle helps keep the body fit and the mind alert. It helps protect you from disease, helps you fight disease, and helps prevent chronic disease (disease that doesn't go away) from getting worse. This is important as you get older and begin to notice twinges in muscles and joints and a decline in the strength and stamina you once took for granted. A healthy lifestyle includes good healthcare, good nutrition, weight control, recreation, and regular exercise. Avoid harmful substances and do what you can to keep safe. Another part of a healthy lifestyle is stay mentally active and socially involved.    Good healthcare   Have a wellness visit every year.   If you have new symptoms, let us know right away. Don't wait until the next checkup.   Take medicines exactly as prescribed and keep your medicines in a safe place. Tell us if your medicine causes problems.   Healthy diet and weight control   Eat 3 or 4 small, nutritious, low-fat, high-fiber meals a day. Include a variety of fruits, vegetables, and whole-grain foods.   Make sure you get enough calcium in your diet. Calcium, vitamin D, and exercise help prevent osteoporosis (bone thinning).   If you live alone, try eating with  "others when you can. That way you get a good meal and have company while you eat it.   Try to keep a healthy weight. If you eat more calories than your body uses for energy, it will be stored as fat and you will gain weight.     Recreation   Recreation is not limited to sports and team events. It includes any activity that provides relaxation, interest, enjoyment, and exercise. Recreation provides an outlet for physical, mental, and social energy. It can give a sense of worth and achievement. It can help you stay healthy.    Mental Exercise and Social Involvement  Mental and emotional health is as important as physical health. Keep in touch with friends and family. Stay as active as possible. Continue to learn and challenge yourself.   Things you can do to stay mentally active are:  Learn something new, like a foreign language or musical instrument.   Play SCRABBLE or do crossword puzzles. If you cannot find people to play these games with you at home, you can play them with others on your computer through the Internet.   Join a games club--anything from card games to chess or checkers or lawn bowling.   Start a new hobby.   Go back to school.   Volunteer.   Read.   Keep up with world events.  Learning About Being Physically Active  What is physical activity?     Being physically active means doing any kind of activity that gets your body moving.  The types of physical activity that can help you get fit and stay healthy include:  Aerobic or \"cardio\" activities. These make your heart beat faster and make you breathe harder, such as brisk walking, riding a bike, or running. They strengthen your heart and lungs and build up your endurance.  Strength training activities. These make your muscles work against, or \"resist,\" something. Examples include lifting weights or doing push-ups. These activities help tone and strengthen your muscles and bones.  Stretches. These let you move your joints and muscles through their full " range of motion. Stretching helps you be more flexible.  Reaching a balance between these three types of physical activity is important because each one contributes to your overall fitness.  What are the benefits of being active?  Being active is one of the best things you can do for your health. It helps you to:  Feel stronger and have more energy to do all the things you like to do.  Focus better at school or work.  Feel, think, and sleep better.  Reach and stay at a healthy weight.  Lose fat and build lean muscle.  Lower your risk for serious health problems, including diabetes, heart attack, high blood pressure, and some cancers.  Keep your heart, lungs, bones, muscles, and joints strong and healthy.  How can you make being active part of your life?  Start slowly. Make it your long-term goal to get at least 30 minutes of exercise on most days of the week. Walking is a good choice. You also may want to do other activities, such as running, swimming, cycling, or playing tennis or team sports.  Pick activities that you like--ones that make your heart beat faster, your muscles stronger, and your muscles and joints more flexible. If you find more than one thing you like doing, do them all. You don't have to do the same thing every day.  Get your heart pumping every day. Any activity that makes your heart beat faster and keeps it at that rate for a while counts.  Here are some great ways to get your heart beating faster:  Go for a brisk walk, run, or hike.  Go for a swim or bike ride.  Take an online exercise class or dance.  Play a game of touch football, basketball, or soccer.  Play tennis, pickleball, or racquetball.  Climb stairs.  Even some household chores can be aerobic. Just do them at a faster pace. Raking or mowing the lawn, sweeping the garage, and vacuuming and cleaning your home all can help get your heart rate up.  Strengthen your muscles during the week. You don't have to lift heavy weights or grow big,  "bulky muscles to get stronger. Doing a few simple activities that make your muscles work against, or \"resist,\" something can help you get stronger. Aim for at least twice a week.  For example, you can:  Do push-ups or sit-ups, which use your own body weight as resistance.  Lift weights or dumbbells or use stretch bands at home or in a gym or community center.  Stretch your muscles often. Stretching will help you as you become more active. It can help you stay flexible and loosen tight muscles. It can also help improve your balance and posture and can be a great way to relax.  Be sure to stretch the muscles you'll be using when you work out. It's best to warm your muscles slightly before you stretch them. Walk or do some other light aerobic activity for a few minutes. Then start stretching.  When you stretch your muscles:  Do it slowly. Stretching is not about going fast or making sudden movements.  Don't push or bounce during a stretch.  Hold each stretch for at least 15 to 30 seconds, if you can. You should feel a stretch in the muscle, but not pain.  Breathe out as you do the stretch. Then breathe in as you hold the stretch. Don't hold your breath.  If you're worried about how more activity might affect your health, have a checkup before you start. Follow any special advice your doctor gives you for getting a smart start.  Where can you learn more?  Go to https://www.[employer].net/patiented  Enter W332 in the search box to learn more about \"Learning About Being Physically Active.\"  Current as of: June 6, 2023               Content Version: 13.8    6610-6288 [employer].   Care instructions adapted under license by your healthcare professional. If you have questions about a medical condition or this instruction, always ask your healthcare professional. [employer] disclaims any warranty or liability for your use of this information.      Activities of Daily Living    Your Health Risk " Assessment indicates you have difficulties with activities of daily living such as housework, bathing, preparing meals, taking medication, etc. Please make a follow up appointment for us to address this issue in more detail.  Your Health Risk Assessment indicates you feel you are not in good emotional health.    Recreation   Recreation is not limited to sports and team events. It includes any activity that provides relaxation, interest, enjoyment, and exercise. Recreation provides an outlet for physical, mental, and social energy. It can give a sense of worth and achievement. It can help you stay healthy.    Mental Exercise and Social Involvement  Mental and emotional health is as important as physical health. Keep in touch with friends and family. Stay as active as possible. Continue to learn and challenge yourself.   Things you can do to stay mentally active are:  Learn something new, like a foreign language or musical instrument.   Play SCRABBLE or do crossword puzzles. If you cannot find people to play these games with you at home, you can play them with others on your computer through the Internet.   Join a games club--anything from card games to chess or checkers or lawn bowling.   Start a new hobby.   Go back to school.   Volunteer.   Read.   Keep up with world events.

## 2024-01-04 DIAGNOSIS — F34.81 DISRUPTIVE MOOD DYSREGULATION DISORDER (H): ICD-10-CM

## 2024-01-04 RX ORDER — LORAZEPAM 0.5 MG/1
0.25 TABLET ORAL
Qty: 30 TABLET | Refills: 3 | Status: SHIPPED | OUTPATIENT
Start: 2024-01-04 | End: 2024-05-20

## 2024-01-04 NOTE — TELEPHONE ENCOUNTER
Date of Last Office Visit: 09/12/2023  Date of Next Office Visit: 01/09/2024  No shows since last visit: 0  Cancellations since last visit: 0    Medication requested: LORazepam (ATIVAN) 0.5 MG tablet  Date last ordered: 09/12/2023 Qty: 30 tablet Refills: 3     Review of [place] ?: Not providers delegate     Lapse in medication adherence greater than 5 days?: no  If yes, call patient and gather details: n/a  Medication refill request verified as identical to current order?: yes  Result of Last DAM, VPA, Li+ Level, CBC, or Carbamazepine Level (at or since last visit): N/A    Last visit treatment plan: Treatment Plan:     1.  Depakote 125 mg 2 times daily  2.  Lorazepam 0.25 mg 2 times daily  3.  Paroxetine 30 mg daily  4.  Quetiapine 12.5 mg 2 times daily at 8 AM and 4 PM     Continue all other medications as reviewed per electronic medical record today.   Safety plan reviewed. To the Emergency Department as needed or call after hours crisis line at [phone] or [phone]. [place] Crisis Text Line. Text [place] to 817431 or Suicide LifeLine Chat: suicidepreventionlifeline.org/chat/  To schedule individual or family therapy, call [facility] at [phone]  Schedule an appointment with me in January or sooner as needed. Call [facility] at [phone] to schedule.  Follow up with primary care provider as planned or for acute medical concerns.  Call the psychiatric nurse line with medication questions or concerns at [phone]  MyChart may be used to communicate with your provider, but this is not intended to be used for emergencies.    []Medication refilled per  Medication Refill in Ambulatory Care  policy.  [x]Medication unable to be refilled by RN due to criteria not met as indicated below:    []Eligibility - not seen in the last year   []Supervision - no future appointment   []Compliance - no shows, cancellations or lapse in therapy   []Verification - order  discrepancy   [x]Controlled medication   []Medication not included in policy   []90-day supply request   [x]Other- MA processed    Fernando Dickens MA on 1/4/2024 at 3:49 PM

## 2024-01-09 ENCOUNTER — TELEPHONE (OUTPATIENT)
Dept: PSYCHIATRY | Facility: CLINIC | Age: 44
End: 2024-01-09
Payer: COMMERCIAL

## 2024-01-09 ENCOUNTER — VIRTUAL VISIT (OUTPATIENT)
Dept: PSYCHIATRY | Facility: CLINIC | Age: 44
End: 2024-01-09
Payer: COMMERCIAL

## 2024-01-09 DIAGNOSIS — F34.81 DISRUPTIVE MOOD DYSREGULATION DISORDER (H): ICD-10-CM

## 2024-01-09 PROCEDURE — 99213 OFFICE O/P EST LOW 20 MIN: CPT | Mod: 95 | Performed by: NURSE PRACTITIONER

## 2024-01-09 RX ORDER — PAROXETINE 30 MG/1
30 TABLET, FILM COATED ORAL EVERY MORNING
Qty: 30 TABLET | Refills: 11 | Status: SHIPPED | OUTPATIENT
Start: 2024-01-09

## 2024-01-09 RX ORDER — QUETIAPINE FUMARATE 25 MG/1
12.5 TABLET, FILM COATED ORAL 2 TIMES DAILY
Qty: 30 TABLET | Refills: 11 | Status: SHIPPED | OUTPATIENT
Start: 2024-01-09 | End: 2024-07-11

## 2024-01-09 ASSESSMENT — PAIN SCALES - GENERAL: PAINLEVEL: NO PAIN (0)

## 2024-01-09 NOTE — NURSING NOTE
Is the patient currently in the state of [place]? YES    Visit mode:VIDEO    If the visit is dropped, the patient can be reconnected by: VIDEO VISIT: Text to cell phone:   Telephone Information:   Mobile [phone]       Will anyone else be joining the visit? NO  (If patient encounters technical issues they should call [phone]    How would you like to obtain your AVS? MyChart    Are changes needed to the allergy or medication list? No    Reason for visit: RECHECK    Alma PARKER

## 2024-01-09 NOTE — PROGRESS NOTES
"Virtual Visit Details    Type of service:  Video Visit   Video Start Time:  2:22 PM  Video End Time: 2:45 PM     Originating Location (pt. Location): Long term Care    Distant Location (provider location):  Off-site  Platform used for Video Visit: [facility] Progress Note    Name: Scot Bishop   : 1980                    Primary Care Provider: Cachorro Morales MD   Therapist: None    PHQ-9 scores:      2022    12:57 PM 4/3/2023    12:46 PM 1/3/2024     9:54 AM   PHQ-9 SCORE   PHQ-9 Total Score MyChart 11 (Moderate depression) 3 (Minimal depression) 3 (Minimal depression)   PHQ-9 Total Score 11 3 3       CLEMENT-7 scores:      2022    12:48 PM   CLEMENT-7 SCORE   Total Score 12 (moderate anxiety)   Total Score 12       Patient Identification:    Patient is a 43 year old year old, single  White Not  or  male  who presents for return visit with me.  Patient is currently disabled. Patient attended the session with father and stepmother , who they agreed to have interview with. Patient prefers to be called: \" Scot\".    Current medications include: acetaminophen (TYLENOL) 325 MG tablet, Take 1 tablet (325 mg) by mouth every 6 hours as needed for mild pain or fever  aspirin (ASPIRIN LOW DOSE) 81 MG chewable tablet, TAKE ONE TABLET PER G TUBE EVERY DAY  atorvastatin (LIPITOR) 40 MG tablet, Take one tablet (40 mg) per G tube every night at bedtime.  divalproex sodium delayed-release (DEPAKOTE) 125 MG DR tablet, Take 1 tablet (125 mg) by mouth 2 times daily  docusate sodium (COLACE) 100 MG capsule, Take 1 capsule (100 mg) by mouth 2 times daily as needed for constipation  LORazepam (ATIVAN) 0.5 MG tablet, Take 0.5 tablets (0.25 mg) by mouth 2 times daily At 8 AM and 4 PM  midodrine (PROAMATINE) 2.5 MG tablet, TAKE ONE TABLET BY MOUTH OR BY FEEDING TUBE THREE TIMES DAILY WITH MEALS Strength: 2.5 mg. Appointment required for further refills  mineral oil-hydrophilic " petrolatum (AQUAPHOR) external ointment, APPLY TO FEET AND LEGS TWICE DAILY AS NEEDED FOR DRYNESS  Neomycin-Bacitracin-Polymyxin (SM TRIPLE ANTIBIOTIC ORIGINAL) 3.5-400-5000 OINT, APPLY TO AFFECTED AREA TWICE A DAY AS NEEDED  nystatin (MYCOSTATIN) 980437 UNIT/GM external cream, APPLY TO BUTTOCKS TWICE DAILY AS NEEDED FOR FUNGAL RASH  PARoxetine (PAXIL) 30 MG tablet, Take 1 tablet (30 mg) by mouth every morning  QUEtiapine (SEROQUEL) 25 MG tablet, Take 0.5 tablets (12.5 mg) by mouth 2 times daily 8 AM and 4 PM    No current facility-administered medications on file prior to visit.       The [place] Prescription Monitoring Program has been reviewed and there are no concerns about diversionary activity for controlled substances at this time.      I was able to review most recent Primary Care Provider, specialty provider, and therapy visit notes that I have access to.     Today, Scot's father and stepmother report that the care team staff does not want to see any changes in Scot's medications.  His weight has been stable.  Recently he pulled out his feeding tube and they opted not to replace it as he is now eating and swallowing food by mouth.  There were is no reported skin excoriation as he is wheelchair bound and it has been difficult to work with him to stand.  Family does not see any expression of emotions.  Sometimes he will laugh or become slightly agitated when staff are moving him around, but overall he is controlled and calm.         has a past medical history of Acute respiratory failure with hypoxia (H), Aggression (11/09/2020), Agitation (09/21/2020), LOWELL (acute kidney injury) (H), Back injury, sequela (10/27/2017), Cardiac arrest (H) (05/17/2020), Cardiac arrest (H), Cognitive disorder (11/11/2020), Depressive disorder, Dysphagia (11/11/2020), Dysphagia, Gastrojejunostomy tube status (H) (11/11/2020), HTN (hypertension), Hypoxic ischemic encephalopathy, Low blood pressure (09/17/2020), Nonverbal  (11/11/2020), NSTEMI (non-ST elevated myocardial infarction) (H), and TBI (traumatic brain injury) (H) (06/25/2020).    Social history updates:    No change in Scot's social history      Vital Signs:   There were no vitals taken for this visit.    Labs:    Most recent laboratory results reviewed and no new labs.     Mental Status Examination:  Appearance: awake, alert and casual dress  Attitude:  Calm  Eye Contact:  looking around room  Gait and Station: Uses wheelchair  Psychomotor Behavior:  fidgeting and mouth movements  Oriented to:   Person  Attention Span and Concentration:  Easily distracted  Speech:   vtspeech: mute  Mood:   Calm  Affect:  nonreactive  Associations:   Not able to assess  Thought Process:  linear  Thought Content:  no evidence of suicidal ideation or homicidal ideation  Recent and Remote Memory:  poor Not formally assessed. No amnesia.  Fund of Knowledge: low-normal  Insight:   None  Judgment:  None  Impulse Control:   Poor    Suicide Risk Assessment:  Today Scot exhibits no self-harm behaviors. In addition, there are notable risk factors for self-harm, including comorbid medical condition of traumatic brain injury and mood change. However, risk is mitigated by 24-hour supervision and care facility. Therefore, based on all available evidence including the factors cited above, Scot Bishop does not appear to be at imminent risk for self-harm, does not meet criteria for a 72-hr hold, and therefore remains appropriate for ongoing outpatient level of care.  A thorough assessment of risk factors related to suicide and self-harm have been reviewed and are noted above. The patient convincingly denies suicidality on several occasions. Local community safety resources printed and reviewed for patient to use if needed. There was no deceit detected, and the patient presented in a manner that was believable.     DSM5 Diagnosis:  296.99 (F34.8) Disruptive Mood Dysregulation Disorder    Medical  comorbidities include:   Patient Active Problem List    Diagnosis Date Noted    Hypotension, unspecified hypotension type 01/17/2022     Priority: Medium    CARDIOVASCULAR SCREENING; LDL GOAL LESS THAN 100 11/16/2021     Priority: Medium    Ischemic encephalopathy 09/29/2021     Priority: Medium     Last Assessment & Plan:   Formatting of this note might be different from the original.  Hypoxic brain injury 5/2020.   Largely non-verbal.  Not following commands.   Lip smacking mouth movements.     Plan   - check EEG to evaluate for seizures   - continue therapy evaluations   - prognosis - guarded, history and exam are consistent with severe, permanent brain injury      Nicotine dependence 04/16/2021     Priority: Medium     Formatting of this note might be different from the original.  Created by Conversion      Thrombocytopenia, unspecified (H24) 04/16/2021     Priority: Medium    Thrush, oral 12/09/2020     Priority: Medium    Advance care planning 12/04/2020     Priority: Medium     Formatting of this note might be different from the original.  [facility] was asked to see patient by inpatient palliative care on 11/23 for continuation of palliative care in a new setting.  Date of last visit: 12/9/2020    Formatting of this note is different from the original.  Advance Care Planning   Patient has identified Health Care Agent(s): Yes  Add Health Care Agents: Yes    Health Care Agent(s):  Guardian: Gerald Bishop Relationship: father Phone: [phone]     Patient has Advance Care Plan Documents (Health Care Directive, POLST): Yes    Advance Care Plan Documents:  POLST Form     Patient has identified Specific Treatment Preferences: Yes     How have preferences been verified: POLST    Specific Treatment Preferences:   a.) Code Status:  DNR/ Do Not Attempt Resuscitation - Allow a Natural Death  b.) Goals of Treatment:   ii. Selective Treatment: Use medical treatment, antibiotics, IV fluids and cardiac  monitor as indicated. No intubation, advanced airway interventions, or mechanical ventilation. May consider less invasive airway support (e.g. CPAP, BiPAP). Transfer to hospital if indicated. Generally avoid the intensive care unit. All patients will receive comfort-focused treatments.  TREATMENT PLAN: Provide basic medical treatments aimed at treating new or reversible illness.  c.) Interventions and Treatments:  i.  Artificially Administered Nutrition and Hydration: - Long term artificial nutrion/hydration by tube through (not specified) (specify mouth/nose) and (not specified) (specify if ok with directly into GI tract)  ii.  Antibiotics: - Oral antibiotics only (NO IV/IM)      Acute respiratory failure with hypoxia (H) 12/04/2020     Priority: Medium    Gastrojejunostomy tube status (H) 11/11/2020     Priority: Medium    Cognitive disorder 11/11/2020     Priority: Medium    Aggression 11/09/2020     Priority: Medium    Other symptoms and signs involving appearance and behavior 11/09/2020     Priority: Medium    Agitation 09/21/2020     Priority: Medium    G tube feedings (H) 09/03/2020     Priority: Medium    Aphasia 08/05/2020     Priority: Medium    Dysphagia 08/04/2020     Priority: Medium    Non-ST elevation (NSTEMI) myocardial infarction (H) 07/31/2020     Priority: Medium    Cardiac arrest due to other underlying condition (H) 07/31/2020     Priority: Medium    Encephalopathy, unspecified 07/31/2020     Priority: Medium    Dysphagia, unspecified 07/31/2020     Priority: Medium    Diffuse traumatic brain injury with loss of consciousness of unspecified duration, subsequent encounter 07/31/2020     Priority: Medium    Hypoxic ischemic encephalopathy (HIE), unspecified (H28) 07/31/2020     Priority: Medium    Traumatic brain injury (H) 06/25/2020     Priority: Medium    Cardiac arrest (H) 05/17/2020     Priority: Medium    Back injury, sequela 10/27/2017     Priority: Medium     Formatting of this note might  be different from the original.  WC Case w/ [employer] (www.accidentfund.[employer])  CLM #[id number];  Lydia Machuca [phone]         Assessment:    Scot Ellison has not changed in his cognitive status.  He remains mute, incontinent, and unable to ambulate.  He has been more calm and less agitated with his current medication regiment.  No changes will be made in his medications today.    Medication side effects and alternatives were reviewed. Health promotion activities recommended and reviewed today. All questions addressed. Education and counseling completed regarding risks and benefits of medications and psychotherapy options.    Treatment Plan:      1.  Continue Depakote 125 mg 2 times daily  2.  Continue lorazepam 0.25 mg 2 times daily at 8 AM and 4 PM  3.  Continue paroxetine 30 mg daily  4.  Continue quetiapine 12.5 mg 2 times daily at 8 AM and 4 PM    Continue all other medications as reviewed per electronic medical record today.   Safety plan reviewed. To the Emergency Department as needed or call after hours crisis line at [phone] or [phone]. [place] Crisis Text [place]. Text [place] to 354968 or Suicide LifeLine Chat: suicidepreventionlifeline.org/chat/  To schedule individual or family therapy, call [facility] at [phone]  Schedule an appointment with me in July 11 or sooner as needed. Call [facility] at [phone] to schedule.  Follow up with primary care provider as planned or for acute medical concerns.  Call the psychiatric nurse line with medication questions or concerns at [phone]  MyChart may be used to communicate with your provider, but this is not intended to be used for emergencies.    Crisis Resources:    National Suicide Prevention Lifeline: 259.571.1106 (TTY: 218.669.3689). Call anytime for help.  (www.suicidepreventionlifeline.org)  National [place] on Mental Illness (www.melvin.org): 570.460.6620 or 221-563-9933.    Mental Health Association (www.mentalhealth.org): 932-048-9391 or 242-247-0075.  [place] Crisis Text [place]: Text [place] to 452261  Suicide LifeLine Chat: suicidepreAltaSensline.org/chat    Administrative Billing:   Time spent with patient includes counseling and coordination of care regarding above diagnoses and treatment plan.    Patient Status:  This is a continuous care patient and medications will be prescribed by the psychiatric provider until further indicated.    Signed:   LASHAWN Jeong-BC   Psychiatry

## 2024-01-09 NOTE — TELEPHONE ENCOUNTER
Received refill request for paroxetine 30 mg from [facility]. Patient has an appt today with provider.   Left note in appt notes for a refill.   Last filled 9/12/23 for #30 and 3 refills.     Harriet Poe RN on 1/9/2024 at 9:55 AM

## 2024-01-11 ENCOUNTER — NURSE TRIAGE (OUTPATIENT)
Dept: INTERNAL MEDICINE | Facility: CLINIC | Age: 44
End: 2024-01-11
Payer: COMMERCIAL

## 2024-01-11 ENCOUNTER — PATIENT OUTREACH (OUTPATIENT)
Dept: CARE COORDINATION | Facility: CLINIC | Age: 44
End: 2024-01-11
Payer: COMMERCIAL

## 2024-01-11 DIAGNOSIS — I95.9 HYPOTENSION, UNSPECIFIED HYPOTENSION TYPE: ICD-10-CM

## 2024-01-11 NOTE — PATIENT INSTRUCTIONS
"Patient Education   The Panel Psychiatry Program  What to Expect  Here's what to expect in the Panel Psychiatry Program.   About the program  You'll be meeting with a psychiatric doctor to check your mental health. A psychiatric doctor helps you deal with troubling thoughts and feelings by giving you medicine. They'll make sure you know the plan for your care. You may see them for a long time. When you're feeling better, they may refer you back to seeing your family doctor.   If you have any questions, we'll be glad to talk to you.  About visits  Be open  At your visits, please talk openly about your problems. It may feel hard, but it's the best way for us to help you.  Cancelling visits  If you can't come to your visit, please call us right away at [phone]. If you don't cancel at least 24 hours (1 full day) before your visit, that's \"late cancellation.\"  Not showing up for your visits  Being very late is the same as not showing up. You'll be a \"no show\" if:  You're more than 15 minutes late for a 30-minute (half hour) visit.  You're more than 30 minutes late for a 60-minute (full hour) visit.  If you cancel late or don't show up 2 times within 6 months, we may end your care.  Getting help between visits  If you need help between visits, you can call us Monday to Friday from 8 a.m. to 4:30 p.m. at [phone].  Emergency care  Call 911 or go to the nearest emergency department if your life or someone else's life is in danger.  Call 988 anytime to reach the national Suicide and Crisis hotline.  Medicine refills  To refill your medicine, call your pharmacy. You can also call [facility]'s Behavioral Access at [phone], Monday to Friday, 8 a.m. to 4:30 p.m. It can take 1 to 3 business days to get a refill.   Forms, letters, and tests  You may have papers to fill out, like FMLA, short-term disability, and workability. We can help you with these forms at your visits, but you must have an " appointment. You may need more than 1 visit for this, to be in an intensive therapy program, or both.  Before we can give you medicine for ADHD, we may refer you to get tested for it or confirm it another way.  We may not be able to give you an emotional support animal letter.  We don't do mental health checks ordered by the court.   We don't do mental health testing, but we can refer you to get tested.   Thank you for choosing us for your care.  For informational purposes only. Not to replace the advice of your health care provider. Copyright   2022 [facility]. All rights reserved. [employer] 210060 - 12/22.       Treatment Plan:      1.  Continue Depakote 125 mg 2 times daily  2.  Continue lorazepam 0.25 mg 2 times daily at 8 AM and 4 PM  3.  Continue paroxetine 30 mg daily  4.  Continue quetiapine 12.5 mg 2 times daily at 8 AM and 4 PM    Continue all other medications as reviewed per electronic medical record today.   Safety plan reviewed. To the Emergency Department as needed or call after hours crisis line at [phone] or [phone]. [place] Crisis Text [place]. Text [place] to 072909 or Suicide LifeLine Chat: suicidepreventionedoline.org/chat/  To schedule individual or family therapy, call [facility] at [phone]  Schedule an appointment with me in July 11 or sooner as needed. Call [facility] at [phone] to schedule.  Follow up with primary care provider as planned or for acute medical concerns.  Call the psychiatric nurse line with medication questions or concerns at [phone]  MyChart may be used to communicate with your provider, but this is not intended to be used for emergencies.    Crisis Resources:    National Suicide Prevention Lifeline: 157.457.4238 (TTY: 502.828.6677). Call anytime for help.  (www.suicidepreventionlifeline.org)  National [place] on Mental Illness (www.melvin.org): 411.198.7725 or 488-510-5623.   Mental Health Association  (www.mentalhealth.org): 807-808-8291 or 795-835-3449.  [place] Crisis Text [place]: Text [place] to 775881  Suicide LifeLine Chat: suicidepreventionlifeline.org/chat

## 2024-01-11 NOTE — PROGRESS NOTES
Clinic Care Coordination Contact  [facility]/Voicemail    Clinical Data: Care Coordinator Outreach    Outreach Documentation Number of Outreach Attempt   1/11/2024  11:10 AM 2   1/11/2024  11:11 AM 1     Left message on patient's voicemail with call back information and requested return call.    Plan: Care Coordinator will try to reach patient again in 1 month.     Candace Perez RN, BSN, PHN  Primary Care / Care Coordinator   [facility]  E-mail [email or web address]   [phone]

## 2024-01-11 NOTE — TELEPHONE ENCOUNTER
Not an expert in this field and would suggest that any recommendations for ambulatory status be suggested per OT or PT assessment and if needed a DME can be ordered

## 2024-01-11 NOTE — LETTER
[facility]  Patient Centered Plan of Care  About Me:        Patient Name:  Scot Bishop    YOB: 1980  Age:         43 year old   Heather MRN:    [id number] Telephone Information:  Home Phone [phone]   Mobile [phone]       Address:  [address] [address] Email address:  [email or web address]      Emergency Contact(s)    Name Relationship Lgl Grd Work Phone Home Phone Mobile Phone   1. NIGEL BISHOP* Father Yes  [phone]   2. MITZYHEYDIE* Mother No  [phone]   3. [facility] HO*   [phone]    4. WALKER RAMAN D*     [phone]   5. LOS BISHOP Stepparent No  [phone]           Primary language:  English     needed? No   [employer]:  [phone] op. 1  Other communication barriers:Cognitive impairment; Other (Non verbal)    Preferred Method of Communication:  Margie  Current living arrangement: Other ([facility])    Mobility Status/ Medical Equipment: Dependent/Assisted by Another    Health Maintenance  Health Maintenance Reviewed: Due/Overdue   Health Maintenance Due   Topic Date Due    HF ACTION PLAN  Never done    HEPATITIS B IMMUNIZATION (1 of 3 - 3-dose series) Never done     My Access Plan  Medical Emergency 911   Primary Clinic Line [facility] - [phone]   24 Hour Appointment Line [phone] or  [phone]) (toll-free)   24 Hour Nurse Line [phone] (toll-free)   Preferred Urgent Care [facility], [phone]     Preferred Hospital [facility]  [phone]     Preferred Pharmacy [facility] - [place], [place] - [address]     Behavioral Health Crisis Line The National Suicide Prevention Lifeline at 1-917.817.7737 or Text/Call 288     My Care Team Members  Patient Care Team         Relationship Specialty Notifications Start  End    Cachorro Morales MD PCP - General Internal Medicine  1/19/21     STILL CURRENT AS OF 07/22/2022    Phone: [phone] Fax: [phone]         [address] [place] [id number]    Scot Matamoros MD MD Cardiology  6/12/20     Phone: [phone] Fax: [phone]         [address] [address]    Cachorro Morales MD Assigned PCP   12/24/20     Phone: [phone] Fax: [phone]         [address] [place] [id number]    EVELINA Myles CM    2/5/21     [facility]    Phone: [phone]         [facility]     2/5/21     [facility] group home    Phone: [phone] Fax: [phone]        Melanie financial worker Financial Resource Worker   2/5/21     [facility]    Phone: [phone]         Elsa Rahman NP Assigned Behavioral Health Provider   2/14/21     Phone: [phone] Fax: [phone]         [address] [place] [address]    Candace Perez RN Lead Care Coordinator  [facility] 8/19/21     Emelina Gama PA-C Physician Assistant Cardiovascular Disease  3/24/23     Phone: [phone] Fax: [phone] [address] [place] [address]    Emelina Gama PA-C Assigned Heart and Vascular Provider   5/13/23     Phone: [phone] Fax: [phone] [address] [place] [address]                My Care Plans  Self Management and Treatment Plan    Care Plan  Care Plan: Physical Therapy       Problem: Lifestyle choices       Goal: Functional       Start Date: 8/20/2021 Expected End Date: 4/11/2024    This Visit's Progress: 90% Recent Progress: 90%    Note:     Goal Statement: Patient will continue Outpatient Physical Therapy or Home Care Physical Therapy PRN; Group Home with use EZ lift to keep patient mobile/up  Barriers: Deconditioned   Strengths:Supportive parents   Patient/caregiver expressed understanding of goal: Yes  Action steps to  achieve this goal:  1. I/caregiver will keep future appointments  2. I/caregiver will discuss, review, schedule and complete recommended overdue health maintenance with my primary care provider  3. I/caregiver will contact my care team with questions, concerns, support needs   4. I/caregiver will use the clinic as a resource, and I understand I can contact my clinic with 24/7 after hours services available                             Action Plans on File:     Advance Care Plans/Directives:   Advanced Care Plan/Directives on file:   Yes    Status of Document(s): No data recorded  Advanced Care Plan/Directives Type:   Advanced Directive - On File; DNR/DNI       My Medical and Care Information  Problem List   Patient Active Problem List   Diagnosis    Cardiac arrest (H)    Traumatic brain injury (H)    Thrush, oral    Nicotine dependence    Gastrojejunostomy tube status (H)    G tube feedings (H)    Advance care planning    Dysphagia    Cognitive disorder    Back injury, sequela    Agitation    Aggression    Acute respiratory failure with hypoxia (H)    Thrombocytopenia, unspecified (H24)    CARDIOVASCULAR SCREENING; LDL GOAL LESS THAN 100    Hypotension, unspecified hypotension type    Aphasia    Non-ST elevation (NSTEMI) myocardial infarction (H)    Other symptoms and signs involving appearance and behavior    Cardiac arrest due to other underlying condition (H)    Encephalopathy, unspecified    Dysphagia, unspecified    Diffuse traumatic brain injury with loss of consciousness of unspecified duration, subsequent encounter    Hypoxic ischemic encephalopathy (HIE), unspecified (H28)    Ischemic encephalopathy      Current Medications and Allergies:  No Known Allergies  Current Outpatient Medications   Medication    acetaminophen (TYLENOL) 325 MG tablet    aspirin (ASPIRIN LOW DOSE) 81 MG chewable tablet    atorvastatin (LIPITOR) 40 MG tablet    divalproex sodium delayed-release (DEPAKOTE) 125 MG DR tablet    docusate  sodium (COLACE) 100 MG capsule    LORazepam (ATIVAN) 0.5 MG tablet    midodrine (PROAMATINE) 2.5 MG tablet    mineral oil-hydrophilic petrolatum (AQUAPHOR) external ointment    Neomycin-Bacitracin-Polymyxin (SM TRIPLE ANTIBIOTIC ORIGINAL) 3.5-400-5000 OINT    nystatin (MYCOSTATIN) 705969 UNIT/GM external cream    PARoxetine (PAXIL) 30 MG tablet    QUEtiapine (SEROQUEL) 25 MG tablet     No current facility-administered medications for this visit.         Care Coordination Start Date: 8/20/2021   Frequency of Care Coordination: monthly, more frequently as needed     Form Last Updated: 01/11/2024

## 2024-01-11 NOTE — TELEPHONE ENCOUNTER
"Desi with [facility] calling to report patient low blood pressure.   Outside agency parameters.   Today:  89/63  Denies any known symptoms associated with hypotension.     Per patient's group home :[facility] in [place]   [phone]  Staff reports patient 'chronic\" low BP.    Other recent BP readin/70    Current med for low BP: Midodrine 2.5 m tablet po or by feeding tube three times daily with meals.      Advise please for low BP.   2. Also--there is a recommendation for equipment enabling patient to stand/supported.   OT asking if OK given patient's hypotension.    Otherwise, patient is in chair for activities.     Will need to follow up with Desi OT with PCP advise.   Desi:  [phone]   May leave detailed msg/orders on voice mail.     Thank you,  Nessa BYERS, RN      2024  11:40 AM          "

## 2024-01-11 NOTE — TELEPHONE ENCOUNTER
If patient remains asymptomatic in regards to blood pressure okay to proceed as indicated.  Would continue to monitor blood pressure.  I would suggest aggressively making sure the patient is getting enough fluids and keeping well-hydrated.

## 2024-02-06 ENCOUNTER — TELEPHONE (OUTPATIENT)
Dept: INTERNAL MEDICINE | Facility: CLINIC | Age: 44
End: 2024-02-06
Payer: COMMERCIAL

## 2024-02-06 DIAGNOSIS — S06.2X9D DIFFUSE TRAUMATIC BRAIN INJURY WITH LOSS OF CONSCIOUSNESS OF UNSPECIFIED DURATION, SUBSEQUENT ENCOUNTER: Primary | ICD-10-CM

## 2024-02-06 NOTE — TELEPHONE ENCOUNTER
I do not know the information on the catheterizations as to how often it is used, information is going to have to be obtained prior to me being able to fill these DME scripts.

## 2024-02-06 NOTE — TELEPHONE ENCOUNTER
José Miguel, , states that they have had difficulty getting patient's DME supplies from [facility].    José Miguel requests that   ALL orders for DME be sent to [facility]. Fax [phone].            Need the following orders faxed: to [facility]       2000 ml night bag     32 ounce leg bag     62 condom caths, size large (23 mm), patient uses 2 per day    Also needs orders from [facility] for DME faxed to [facility]        300 large disposable briefs       60 under pads (23-26) for 1 month covered, request 3 month supply        Gloves - 2 boxes large and 2 boxes medium covered for 1 month         Wipes as ordered during OV. Did not specify quantity.     Please review and sign pended orders.     Marguerite Torres RN

## 2024-02-07 NOTE — TELEPHONE ENCOUNTER
Previous DME orders placed 1/3/24 faxed to [facility].  - Disposable under pads  - Diaper wipes  - Disposable briefs  - Gloves    Catheterization Supplies DME pended to match last DME placed 2/15/2021.

## 2024-02-13 ENCOUNTER — MEDICAL CORRESPONDENCE (OUTPATIENT)
Dept: HEALTH INFORMATION MANAGEMENT | Facility: CLINIC | Age: 44
End: 2024-02-13
Payer: COMMERCIAL

## 2024-02-13 ENCOUNTER — PATIENT OUTREACH (OUTPATIENT)
Dept: CARE COORDINATION | Facility: CLINIC | Age: 44
End: 2024-02-13
Payer: COMMERCIAL

## 2024-02-13 NOTE — PROGRESS NOTES
Clinic Care Coordination Contact  [facility]    Clinical Data: Care Coordinator Outreach    Outreach Documentation Number of Outreach Attempt   1/11/2024  11:10 AM 2   1/11/2024  11:11 AM 1   2/13/2024  12:30 PM 2     Left message on patient's father's voicemail with call back information and requested return call.    Plan: Care Coordinator will try to reach patient again in 1 month.     Candace Perez RN, BSN, PHN  Primary Care / Care Coordinator   [facility]  E-mail [email or web address]   [phone]

## 2024-03-26 DIAGNOSIS — I95.9 HYPOTENSION, UNSPECIFIED HYPOTENSION TYPE: ICD-10-CM

## 2024-03-26 RX ORDER — MIDODRINE HYDROCHLORIDE 2.5 MG/1
TABLET ORAL
Qty: 270 TABLET | Refills: 0 | Status: SHIPPED | OUTPATIENT
Start: 2024-03-26 | End: 2024-07-16

## 2024-03-27 ENCOUNTER — PATIENT OUTREACH (OUTPATIENT)
Dept: CARE COORDINATION | Facility: CLINIC | Age: 44
End: 2024-03-27
Payer: COMMERCIAL

## 2024-03-27 NOTE — LETTER
[facility] COORDINATION  [address] [facility] [address]    March 27, 2024    Scot Bishop  [address] [address]    Dear Barbie,    I have been unsuccessful in reaching you since our last contact. At this time the Care Coordination team will make no further attempts to reach you, however this does not change your ability to continue receiving care from your providers at your primary care clinic. If you need additional support from a care coordinator in the future please contact [facility] at [phone].    All of us at [facility] are invested in your health and are here to assist you in meeting your goals.     Sincerely,     Candace Perez RN, BSN, PHN  Primary Care / Care Coordinator   [facility]  E-mail [email or web address]   [phone]

## 2024-04-03 DIAGNOSIS — I50.21 ACUTE SYSTOLIC HEART FAILURE (H): ICD-10-CM

## 2024-04-03 DIAGNOSIS — I46.9 CARDIAC ARREST (H): ICD-10-CM

## 2024-04-03 DIAGNOSIS — E78.49 OTHER HYPERLIPIDEMIA: ICD-10-CM

## 2024-04-03 DIAGNOSIS — E78.5 HLD (HYPERLIPIDEMIA): ICD-10-CM

## 2024-04-03 DIAGNOSIS — I95.9 HYPOTENSION, UNSPECIFIED HYPOTENSION TYPE: Primary | ICD-10-CM

## 2024-04-03 RX ORDER — ATORVASTATIN CALCIUM 40 MG/1
TABLET, FILM COATED ORAL
Qty: 90 TABLET | Refills: 2 | Status: SHIPPED | OUTPATIENT
Start: 2024-04-03

## 2024-04-03 NOTE — TELEPHONE ENCOUNTER
[facility] Cardiology Refill Guideline reviewed.  Medication meets criteria for refill.      FLP up to date. Pt is due for follow up. Order placed for follow up and noted on prescription

## 2024-05-15 ENCOUNTER — MEDICAL CORRESPONDENCE (OUTPATIENT)
Dept: HEALTH INFORMATION MANAGEMENT | Facility: CLINIC | Age: 44
End: 2024-05-15
Payer: COMMERCIAL

## 2024-05-20 ENCOUNTER — MYC REFILL (OUTPATIENT)
Dept: PSYCHIATRY | Facility: CLINIC | Age: 44
End: 2024-05-20
Payer: COMMERCIAL

## 2024-05-20 DIAGNOSIS — F34.81 DISRUPTIVE MOOD DYSREGULATION DISORDER (H): ICD-10-CM

## 2024-05-20 NOTE — TELEPHONE ENCOUNTER
Date of Last Office Visit: 1/9/42  Date of Next Office Visit: 7/11/24  No shows since last visit: 0  Cancellations since last visit: 0    Medication requested: LORazepam (ATIVAN) 0.5 MG tablet  Date last ordered: 1/4/2 Qty: 30 Refills: 3     [place] of [place] ?: LORazepam (ATIVAN) 0.5 MG tablet   Medication last sold date: 5/6/24 Qty filled: 28  Other controlled substance on [place] ?: no    Lapse in medication adherence greater than 5 days?: no  If yes, call patient and gather details: phoned the patient. He checked with his group home and there has not been a lapse in therapy  Medication refill request verified as identical to current order?: yes  Result of Last DAM, VPA, Li+ Level, CBC, or Carbamazepine Level (at or since last visit): N/A    Last visit treatment plan:     Treatment Plan:        1.  Continue Depakote 125 mg 2 times daily  2.  Continue lorazepam 0.25 mg 2 times daily at 8 AM and 4 PM  3.  Continue paroxetine 30 mg daily  4.  Continue quetiapine 12.5 mg 2 times daily at 8 AM and 4 PM     Continue all other medications as reviewed per electronic medical record today.   Safety plan reviewed. To the Emergency Department as needed or call after hours crisis line at [phone] or [phone]. [place] Crisis Text Line. Text [place] to 798305 or Suicide LifeLine Chat: suicidepreventionlifeline.org/chat/  To schedule individual or family therapy, call [facility] at [phone]  Schedule an appointment with me in July 11 or sooner as needed.    []Medication refilled per  Medication Refill in Ambulatory Care  policy.  [x]Medication unable to be refilled by RN due to criteria not met as indicated below:    []Eligibility - not seen in the last year   []Supervision - no future appointment   []Compliance - no shows, cancellations or lapse in therapy   []Verification - order discrepancy   [x]Controlled medication   []Medication not included in policy   []90-day supply request   []Other

## 2024-05-21 ENCOUNTER — TELEPHONE (OUTPATIENT)
Dept: PSYCHIATRY | Facility: CLINIC | Age: 44
End: 2024-05-21
Payer: COMMERCIAL

## 2024-05-21 RX ORDER — LORAZEPAM 0.5 MG/1
0.25 TABLET ORAL
Qty: 30 TABLET | Refills: 3 | Status: SHIPPED | OUTPATIENT
Start: 2024-05-21 | End: 2024-07-11

## 2024-05-21 RX ORDER — LORAZEPAM 0.5 MG/1
0.25 TABLET ORAL
Qty: 30 TABLET | Refills: 3 | Status: SHIPPED | OUTPATIENT
Start: 2024-05-21 | End: 2024-05-21

## 2024-05-21 NOTE — TELEPHONE ENCOUNTER
5/21/24: Reason for call:  Other   Patient called regarding (reason for call): call back  Additional comments: The pharmacy called stating that they received an rx for Lorazepam, but then also received a cancellation right after. They would like to know what is correct, asking for a call back.    Phone number to reach patient:  Other phone number:  [facility], [phone]    Best Time:  ASAP    Can we leave a detailed message on this number?  YES    Travel screening: Not Applicable

## 2024-05-21 NOTE — TELEPHONE ENCOUNTER
RN received notification from the [facility] Coordinators that [address] had called wishing to clarify if this patients LORazepam (ATIVAN) 0.5 MG tablet was active or cancelled.       RN phoned and spoke to the Pharmacist at [address].  They indicated they received a new prescription for LORazepam (ATIVAN) 0.5 MG tablet and then a cancellation request.      RN informed the Pharmacist that this is the second time this prescription has had issues today.  RN clarified that this is a active medication.  The Pharmacist indicated that she will disregard the cancellation and make sure the prescription is active.  The Pharmacist will also notify the owner regarding these electronic prescribing issues.  The Pharmacist will call the patient and she had no further questions or concerns.     Cachorro Reno RN on 5/21/2024 at 4:02 PM

## 2024-05-21 NOTE — TELEPHONE ENCOUNTER
RN received a E-Prescribing Error notification regarding this patients Lorazepam prescription.       Disp Refills Start End REENA   LORazepam (ATIVAN) 0.5 MG tablet 30 tablet 3 5/21/2024 -- No   Sig - Route: Take 0.5 tablets (0.25 mg) by mouth 2 times daily At 8 AM and 4 PM - Oral   Sent to pharmacy as: LORazepam 0.5 MG Oral Tablet (ATIVAN)   Class: E-Prescribe   Order: [id number]   E-Prescribing Status: Transmission to pharmacy failed (5/21/2024 11:13 AM CDT)        RN phoned [address] and spoke to the Pharmacist.  The Pharmacist verified they never received this order.  The Pharmacist asked that the order be resent.          2.  RN resubmitted this prescription to Elsa Rahman CNP for her review.  RN pended the prescription to [address]    Cachorro Reno RN on 5/21/2024 at 12:21 PM

## 2024-07-11 ENCOUNTER — OFFICE VISIT (OUTPATIENT)
Dept: PSYCHIATRY | Facility: CLINIC | Age: 44
End: 2024-07-11
Payer: COMMERCIAL

## 2024-07-11 VITALS
WEIGHT: 150 LBS | HEART RATE: 84 BPM | SYSTOLIC BLOOD PRESSURE: 83 MMHG | DIASTOLIC BLOOD PRESSURE: 50 MMHG | OXYGEN SATURATION: 96 % | HEIGHT: 68 IN | BODY MASS INDEX: 22.73 KG/M2

## 2024-07-11 DIAGNOSIS — F34.81 DISRUPTIVE MOOD DYSREGULATION DISORDER (H): Primary | ICD-10-CM

## 2024-07-11 DIAGNOSIS — F51.04 PSYCHOPHYSIOLOGICAL INSOMNIA: ICD-10-CM

## 2024-07-11 PROCEDURE — 99214 OFFICE O/P EST MOD 30 MIN: CPT | Performed by: NURSE PRACTITIONER

## 2024-07-11 RX ORDER — ATORVASTATIN CALCIUM 40 MG/1
TABLET, FILM COATED ORAL
Qty: 90 TABLET | Refills: 2 | Status: CANCELLED | OUTPATIENT
Start: 2024-07-11

## 2024-07-11 RX ORDER — QUETIAPINE FUMARATE 25 MG/1
12.5 TABLET, FILM COATED ORAL 2 TIMES DAILY
Qty: 30 TABLET | Refills: 11 | Status: SHIPPED | OUTPATIENT
Start: 2024-07-11

## 2024-07-11 RX ORDER — DIVALPROEX SODIUM 125 MG/1
125 TABLET, DELAYED RELEASE ORAL 2 TIMES DAILY
Qty: 60 TABLET | Refills: 11 | Status: SHIPPED | OUTPATIENT
Start: 2024-07-11

## 2024-07-11 RX ORDER — LORAZEPAM 0.5 MG/1
0.25 TABLET ORAL
Qty: 30 TABLET | Refills: 5 | Status: SHIPPED | OUTPATIENT
Start: 2024-07-11

## 2024-07-11 NOTE — PATIENT INSTRUCTIONS
"Patient Education   The Panel Psychiatry Program  What to Expect  Here's what to expect in the Panel Psychiatry Program.   About the program  You'll be meeting with a psychiatric doctor to check your mental health. A psychiatric doctor helps you deal with troubling thoughts and feelings by giving you medicine. They'll make sure you know the plan for your care. You may see them for a long time. When you're feeling better, they may refer you back to seeing your family doctor.   If you have any questions, we'll be glad to talk to you.  About visits  Be open  At your visits, please talk openly about your problems. It may feel hard, but it's the best way for us to help you.  Cancelling visits  If you can't come to your visit, please call us right away at [phone]. If you don't cancel at least 24 hours (1 full day) before your visit, that's \"late cancellation.\"  Not showing up for your visits  Being very late is the same as not showing up. You'll be a \"no show\" if:  You're more than 15 minutes late for a 30-minute (half hour) visit.  You're more than 30 minutes late for a 60-minute (full hour) visit.  If you cancel late or don't show up 2 times within 6 months, we may end your care.  Getting help between visits  If you need help between visits, you can call us Monday to Friday from 8 a.m. to 4:30 p.m. at [phone].  Emergency care  Call 911 or go to the nearest emergency department if your life or someone else's life is in danger.  Call 988 anytime to reach the national Suicide and Crisis hotline.  Medicine refills  To refill your medicine, call your pharmacy. You can also call [facility]'s Behavioral Access at [phone], Monday to Friday, 8 a.m. to 4:30 p.m. It can take 1 to 3 business days to get a refill.   Forms, letters, and tests  You may have papers to fill out, like FMLA, short-term disability, and workability. We can help you with these forms at your visits, but you must have an " appointment. You may need more than 1 visit for this, to be in an intensive therapy program, or both.  Before we can give you medicine for ADHD, we may refer you to get tested for it or confirm it another way.  We may not be able to give you an emotional support animal letter.  We don't do mental health checks ordered by the court.   We don't do mental health testing, but we can refer you to get tested.   Thank you for choosing us for your care.  For informational purposes only. Not to replace the advice of your health care provider. Copyright   2022 [facility] "[employer]. All rights reserved. [employer] 325684 - 12/22.  Treatment Plan:      1.  Continue lorazepam 0.25 mg 2 times daily at 8 AM and 4 PM  2.  Continue divalproex 125 mg 2 times daily   3.  Continue paroxetine 30 mg daily  4.  Continue quetiapine 12.5 mg 2 times daily at 8 AM and 4 PM  5.  I will contact your primary care provider about the possibility of refilling your prescriptions in the future    Continue all other medical directions per primary care provider.   Continue all other medications as reviewed per electronic medical record today.   Safety plan reviewed. To the Emergency Department as needed or call after hours crisis line at [phone] or [phone]. [place] Crisis Text Line: Text [place] to 731944  or  Suicide LifeLine Chat: suicidepreventionlifeline.org/chat/  To schedule individual or family therapy, call [facility] at [phone].   Schedule an appointment  as needed.  Call [facility] at [phone] to schedule.  Follow up with primary care provider as planned or for acute medical concerns.  Call the psychiatric nurse line with medication questions or concerns at [phone].  STYLHUNThart may be used to communicate with your provider, but this is not intended to be used for emergencies.        Crisis Resources   The [facility] is an adults only unit located at [facility] in [place] is a short  term (generally less than 23 hour stay) designed for crisis intervention and stabilization. Pts have the opportunity to meet quickly with a behavioral health team for evaluation in a calm and peaceful therapuetic environment. To be evaluated for admission pts are triaged throught the [facility] ED.      The following hotlines are for both adults and children. The and are open 24 hours a day, 7 days a week unless noted otherwise.        Crisis Lines      Crisis Text Line  Text 488932  You will be connected with a trained live crisis counselor to provide support.        Gambling Hotline  6.468.979.9923 [hope]        línea de crisis española  233.314.2611        [facility] & "[employer] Helpline  404.350.3362        National Hope Line  2.810.400.7885 [hope]        National Suicide Prevention Lifeline  Free and confidential support  988 or 1.994.348.TALK [8255]  http://suicidepreventionlifeline.org        The [employer] Project (LGBTQ Youth Crisis Line)  0.948.240.6290  text START to 717-908        [facility] Crisis Line  4.864.030.9281 (Press 1)  or text 691988    [facility] Mental Health Crisis Response  Within [place], call **CRISIS [**129408] to be connected to a mental health professional who can assist you.        [facility] Crisis  [phone]      [facility] Mobile Crisis  [phone]      [facility] Crisis  [phone]      [facility] Mobile Crisis  962.611.1800 (adults)  [phone] (children)      [facility] Mobile Crisis  [phone] (adults)  [phone] (children)      [facility] Mobile Crisis  [phone]      [facility] Mobile Crisis  [phone]    Community Resources      Fast Tracker  Linking people to mental health and substance use disorder resources  fasttrackSocialOptimizrn.org        [place] Mental Health Warmline  Peer to peer support  5 pm to 9 am 7 days/week  2.727.764.5434  https://mnwitw.org/mnwarherlinda        National [place] on Mental Illness (DEMETRIUS)   819.337.4933 or 1.888.DEMETRIUS.HELPS  https://namimn.org/        [facility]  [facility] residents only   [address]  [phone]        [facility]  Free mental health counseling  https://walkin.org/  612.870.0565 X2    Mental Health Apps      Calm Harm  https://calmharm.co.uk/      My3  https://my3app.org/      Live Safety Plan  https://www.mysafetyplan.org/

## 2024-07-11 NOTE — PROGRESS NOTES
Mental Health and Collaborative Care Psychiatry Service Rooming Note      Most pressing mental health concern at this time: med check      Any new physical health conditions or diagnoses affecting you that we should be aware of: no feeding tube      Side effects related to medications patient would like to discuss with the provider:  Yes      Are you taking your medications as prescribed?  yes  If not, why? N/A      Do you need refills of any of the medications?  Yes   If so, which ones? See pended medications      Are you taking any recreational substances? no            Corinne Sanchez MA  July 11, 2024  2:34 PM

## 2024-07-11 NOTE — Clinical Note
Good Afternoon Dr. Kahn, I visited with Scot and his parents today and informed them that I will be retiring in September.  I was hoping you might be able to continue with medication refills for him as his mood seems to be at baseline.  The lorazepam is ordered at 0.25 mg twice daily.  Paroxetine is being tolerated well for depression symptoms.  Depakote and quetiapine are stabilizing his mood to be at baseline.  Please let me know if this will work for you okay?  Thank you so much.  Adriana

## 2024-07-11 NOTE — PROGRESS NOTES
"         Outpatient Psychiatric Progress Note    Name: Scot Bishop   : 1980                    Primary Care Provider: Cachorro Morales MD   Therapist: None    PHQ-9 scores:      2022    12:57 PM 4/3/2023    12:46 PM 1/3/2024     9:54 AM   PHQ-9 SCORE   PHQ-9 Total Score MyChart 11 (Moderate depression) 3 (Minimal depression) 3 (Minimal depression)   PHQ-9 Total Score 11 3 3       CLEMENT-7 scores:      2022    12:48 PM   CLEMENT-7 SCORE   Total Score 12 (moderate anxiety)   Total Score 12       Patient Identification:    Patient is a 44 year old year old, single  White Not  or  male  who presents for return visit with me.  Patient is currently disabled. Patient attended the session with his father and stepmother , who they agreed to have interview with. Patient prefers to be called: \" Scot\".    Current medications include:   Current Outpatient Medications   Medication Sig Dispense Refill    acetaminophen (TYLENOL) 325 MG tablet Take 1 tablet (325 mg) by mouth every 6 hours as needed for mild pain or fever 90 tablet 0    aspirin (ASPIRIN LOW DOSE) 81 MG chewable tablet TAKE ONE TABLET PER G TUBE EVERY DAY 90 tablet 3    atorvastatin (LIPITOR) 40 MG tablet Take one tablet (40 mg) per G tube every night at bedtime. 90 tablet 2    divalproex sodium delayed-release (DEPAKOTE) 125 MG DR tablet Take 1 tablet (125 mg) by mouth 2 times daily 60 tablet 11    docusate sodium (COLACE) 100 MG capsule Take 1 capsule (100 mg) by mouth 2 times daily as needed for constipation 180 capsule 0    LORazepam (ATIVAN) 0.5 MG tablet Take 0.5 tablets (0.25 mg) by mouth 2 times daily At 8 AM and 4 PM 30 tablet 3    midodrine (PROAMATINE) 2.5 MG tablet TAKE ONE TABLET BY MOUTH OR BY FEEDING TUBE THREE TIMES DAILY WITH MEALS Strength: 2.5 mg. Appointment required for further refills 270 tablet 0    mineral oil-hydrophilic petrolatum (AQUAPHOR) external ointment APPLY TO FEET AND LEGS TWICE DAILY AS NEEDED FOR " DRYNESS 396 g 0    Neomycin-Bacitracin-Polymyxin (SM TRIPLE ANTIBIOTIC ORIGINAL) 3.5-400-5000 OINT APPLY TO AFFECTED AREA TWICE A DAY AS NEEDED 28.4 g 1    nystatin (MYCOSTATIN) 440990 UNIT/GM external cream APPLY TO BUTTOCKS TWICE DAILY AS NEEDED FOR FUNGAL RASH 15 g 1    PARoxetine (PAXIL) 30 MG tablet Take 1 tablet (30 mg) by mouth every morning 30 tablet 11    QUEtiapine (SEROQUEL) 25 MG tablet Take 0.5 tablets (12.5 mg) by mouth 2 times daily 8 AM and 4 PM 30 tablet 11     No current facility-administered medications for this visit.        The [place] Prescription Monitoring Program has been reviewed and there are no concerns about diversionary activity for controlled substances at this time.      I was able to review most recent Primary Care Provider, specialty provider, and therapy visit notes that I have access to.     Today, the patient's family reports that Scot has had less behavioral outbursts.  During this visit he was very fidgety and nonverbal.  At the end of our interview he did repeat words that were spoken by his stepmother.  His parents feel like he is at baseline.  He is resistive to physical therapy and mostly sits in a chair or lies in bed most days.  He is taking his medications by mouth.  He also is being fed and is able to chew and swallow food.  His family feels that the medications are keeping him stable at this time.       has a past medical history of Acute respiratory failure with hypoxia (H), Aggression (11/09/2020), Agitation (09/21/2020), LOWELL (acute kidney injury) (H24), Back injury, sequela (10/27/2017), Cardiac arrest (H) (05/17/2020), Cardiac arrest (H), Cognitive disorder (11/11/2020), Depressive disorder, Dysphagia (11/11/2020), Dysphagia, Gastrojejunostomy tube status (H) (11/11/2020), HTN (hypertension), Hypoxic ischemic encephalopathy (H28), Low blood pressure (09/17/2020), Nonverbal (11/11/2020), NSTEMI (non-ST elevated myocardial infarction) (H), and TBI (traumatic brain  injury) (H) (06/25/2020).    Social history updates:    He continues to live in an assisted living community with 24-hour supervision and all care provided to him by 24-hour staff.      Vital Signs:   There were no vitals taken for this visit.    Labs:    Most recent laboratory results reviewed and no new labs.     Mental Status Examination:  Appearance: awake, alert and casual dress  Attitude: evasive  Eye Contact:  poor  Gait and Station:  Nonweightbearing  Psychomotor Behavior:  fidgeting and mouth movements  Oriented to:   Person  Attention Span and Concentration:  Poor  Speech:   vtspeech: mute  Mood:  anxious and labile  Affect:  nonreactive  Associations:   Not able to assess  Thought Process:   Not able to assess  Thought Content:   Not able to assess  Recent and Remote Memory:  poor Not formally assessed. No amnesia.  Fund of Knowledge: Low  Insight:   Poor  Judgment:  Poor  Impulse Control:   4    Suicide Risk Assessment:  Today family reports that Scot exhibits no thoughts to harm themself or others. In addition, there are notable risk factors for self-harm, including anxiety and comorbid medical condition of his severe cognitive impairment . However, risk is mitigated by provided 24-hour supervised care. Therefore, based on all available evidence including the factors cited above, Scot Bishop does not appear to be at imminent risk for self-harm, does not meet criteria for a 72-hr hold, and therefore remains appropriate for ongoing outpatient level of care.  A thorough assessment of risk factors related to suicide and self-harm have been reviewed and are noted above. The patient convincingly denies suicidality on several occasions. Local community safety resources printed and reviewed for patient to use if needed. There was no deceit detected, and the patient presented in a manner that was believable.     DSM5 Diagnosis:  296.99 (F34.8) Disruptive Mood Dysregulation Disorder  780.52 (G47.00)  Insomnia Disorder   With other medical comorbidity  Episodic    P91.63 Severe hypoxic ischemic encephalopathy    Medical comorbidities include:   Patient Active Problem List    Diagnosis Date Noted    Hypotension, unspecified hypotension type 01/17/2022     Priority: Medium    CARDIOVASCULAR SCREENING; LDL GOAL LESS THAN 100 11/16/2021     Priority: Medium    Ischemic encephalopathy 09/29/2021     Priority: Medium     Last Assessment & Plan:   Formatting of this note might be different from the original.  Hypoxic brain injury 5/2020.   Largely non-verbal.  Not following commands.   Lip smacking mouth movements.     Plan   - check EEG to evaluate for seizures   - continue therapy evaluations   - prognosis - guarded, history and exam are consistent with severe, permanent brain injury      Nicotine dependence 04/16/2021     Priority: Medium     Formatting of this note might be different from the original.  Created by Conversion      Thrombocytopenia, unspecified (H24) 04/16/2021     Priority: Medium    Thrush, oral 12/09/2020     Priority: Medium    Advance care planning 12/04/2020     Priority: Medium     Formatting of this note might be different from the original.  Community Palliative Care was asked to see patient by inpatient palliative care on 11/23 for continuation of palliative care in a new setting.  Date of last visit: 12/9/2020    Formatting of this note is different from the original.  Advance Care Planning   Patient has identified Health Care Agent(s): Yes  Add Health Care Agents: Yes    Health Care Agent(s):  Guardian: Geraldjaneen Bishop Relationship: father Phone: [phone]     Patient has Advance Care Plan Documents (Health Care Directive, POLST): Yes    Advance Care Plan Documents:  POLST Form     Patient has identified Specific Treatment Preferences: Yes     How have preferences been verified: POLST    Specific Treatment Preferences:   a.) Code Status:  DNR/ Do Not Attempt Resuscitation - Allow a  Natural Death  b.) Goals of Treatment:   ii. Selective Treatment: Use medical treatment, antibiotics, IV fluids and cardiac monitor as indicated. No intubation, advanced airway interventions, or mechanical ventilation. May consider less invasive airway support (e.g. CPAP, BiPAP). Transfer to hospital if indicated. Generally avoid the intensive care unit. All patients will receive comfort-focused treatments.  TREATMENT PLAN: Provide basic medical treatments aimed at treating new or reversible illness.  c.) Interventions and Treatments:  i.  Artificially Administered Nutrition and Hydration: - Long term artificial nutrion/hydration by tube through (not specified) (specify mouth/nose) and (not specified) (specify if ok with directly into GI tract)  ii.  Antibiotics: - Oral antibiotics only (NO IV/IM)      Acute respiratory failure with hypoxia (H) 12/04/2020     Priority: Medium    Gastrojejunostomy tube status (H) 11/11/2020     Priority: Medium    Cognitive disorder 11/11/2020     Priority: Medium    Aggression 11/09/2020     Priority: Medium    Other symptoms and signs involving appearance and behavior 11/09/2020     Priority: Medium    Agitation 09/21/2020     Priority: Medium    G tube feedings (H) 09/03/2020     Priority: Medium    Aphasia 08/05/2020     Priority: Medium    Dysphagia 08/04/2020     Priority: Medium    Non-ST elevation (NSTEMI) myocardial infarction (H) 07/31/2020     Priority: Medium    Cardiac arrest due to other underlying condition (H) 07/31/2020     Priority: Medium    Encephalopathy, unspecified 07/31/2020     Priority: Medium    Dysphagia, unspecified 07/31/2020     Priority: Medium    Diffuse traumatic brain injury with loss of consciousness of unspecified duration, subsequent encounter 07/31/2020     Priority: Medium    Hypoxic ischemic encephalopathy (HIE), unspecified (H28) 07/31/2020     Priority: Medium    Traumatic brain injury (H) 06/25/2020     Priority: Medium    Cardiac arrest  (H) 05/17/2020     Priority: Medium    Back injury, sequela 10/27/2017     Priority: Medium     Formatting of this note might be different from the original.  WC Case w/ [employer] ([email or web address])  CLM #[id number];  Lydia Machuca [phone]         Assessment:    Scot Bishop remains at baseline.  He is mostly nonverbal, able to be fed, incontinent, and mostly wheelchair or bed ridden.  Family was informed of my upcoming correction and Scot will be transferred to his primary care provider for future medication refills.  For mood regulation he continues with Depakote and quetiapine.  To address ongoing anxiety issues paroxetine will continue.  Lorazepam continues at low dose twice daily to help with managing intermittent periods of anxiety during transition in his activities.    Medication side effects and alternatives were reviewed. Health promotion activities recommended and reviewed today. All questions addressed. Education and counseling completed regarding risks and benefits of medications and psychotherapy options.    Treatment Plan:      1.  Continue lorazepam 0.25 mg 2 times daily at 8 AM and 4 PM  2.  Continue divalproex 125 mg 2 times daily   3.  Continue paroxetine 30 mg daily  4.  Continue quetiapine 12.5 mg 2 times daily at 8 AM and 4 PM  5.  I will contact your primary care provider about the possibility of refilling your prescriptions in the future    Continue all other medications as reviewed per electronic medical record today.   Safety plan reviewed. To the Emergency Department as needed or call after hours crisis line at [phone] or [phone]. [place] Crisis Text Line. Text [place] to 539985 or Suicide LifeLine Chat: suicidepreventionlifeline.org/chat/  To schedule individual or family therapy, call [facility] at [phone]  Schedule an appointment as needed. Call [facility] at [phone] to schedule.  Follow up with  primary care provider as planned or for acute medical concerns.  Call the psychiatric nurse line with medication questions or concerns at [phone]  Topple Trackhart may be used to communicate with your provider, but this is not intended to be used for emergencies.        Crisis Resources   The EmPath is an adults only unit located at [facility] is a short term (generally less than 23 hour stay) designed for crisis intervention and stabilization. Pts have the opportunity to meet quickly with a behavioral health team for evaluation in a calm and peaceful therapuetic environment. To be evaluated for admission pts are triaged throught the [facility] ED.      The following hotlines are for both adults and children. The and are open 24 hours a day, 7 days a week unless noted otherwise.        Crisis Lines      Crisis Text Line  Text 765726  You will be connected with a trained live crisis counselor to provide support.        Gambling Hotline  7.172.499.8687 [hope]        línea de crisis española  828.345.5252        [facility]  [phone]        National Hope Line  2.503.232.5063 [hope]        National Suicide Prevention Lifeline  Free and confidential support  988 or 1.551.281.TALK [8255]  http://suicidepreventionlifeline.org        The [employer]amin Project (LGBTQ Youth Crisis Line)  4.411.115.8239  text START to 256-629        's Crisis Line  9.737.176.4325 (Press 1)  or text 121273    [facility] Mental Health Crisis Response  Within [place], call **CRISIS [**765574] to be connected to a mental health professional who can assist you.        [facility] Crisis  [phone]      [facility] Mobile Crisis  [phone]      [facility] Crisis  [phone]      [facility] Mobile Crisis  [phone] (adults)  [phone] (children)      [facility] Mobile Crisis  [phone] (adults)  [phone] (children)      [facility] Mobile Crisis  [phone]       [facility] Mobile Crisis  [phone]    Community Resources      Fast Tracker  Linking people to mental health and substance use disorder resources  [employer]n.org        [place] Mental Health Warmline  Peer to peer support  5 pm to 9 am 7 days/week  5.315.008.3796  https://mnwitw.org/agueda        National [employer] on Mental Illness (DEMETRIUS)  285.696.3646 or 1.888.DEMETRIUS.HELPS  https://namimn.org/        [facility]  [address]  [phone]        [facility]  Free mental health counseling  https://walkin.org/  612.870.0565 X2    Mental Health Apps      Calm Harm  https://calmharm.co.uk/      My3  https://my3app.org/      Live Safety Plan  https://www.mysafetyplan.org/   Administrative Billing:   Time spent with patient includes counseling and coordination of care regarding above diagnoses and treatment plan.    Patient Status:  The patient is being returned to the primary care provider for ongoing care and medication prescribing.      Signed:   LASHAWN Jeong-BC   Psychiatry

## 2024-07-15 DIAGNOSIS — I95.9 HYPOTENSION, UNSPECIFIED HYPOTENSION TYPE: ICD-10-CM

## 2024-07-15 NOTE — LETTER
July 16, 2024       TO: Scot Bishop  [address] [address]       Dear Scot Bishop,    You have requested a medication refill and our records indicate that you have not been seen by one of our providers for your annual office visit.  We will refill your medication for 3 months, which will allow you enough time to be seen by one of our providers.    Please call our clinic at [phone] to schedule your appointment as soon as possible.    Thank you,    [facility]

## 2024-07-16 DIAGNOSIS — I46.9 CARDIAC ARREST (H): ICD-10-CM

## 2024-07-16 RX ORDER — MIDODRINE HYDROCHLORIDE 2.5 MG/1
2.5 TABLET ORAL 3 TIMES DAILY
Qty: 270 TABLET | Refills: 0 | Status: SHIPPED | OUTPATIENT
Start: 2024-07-16 | End: 2024-09-09

## 2024-07-16 RX ORDER — ASPIRIN 81 MG/1
TABLET, CHEWABLE ORAL
Qty: 90 TABLET | Refills: 0 | Status: SHIPPED | OUTPATIENT
Start: 2024-07-16 | End: 2024-09-09

## 2024-07-16 NOTE — TELEPHONE ENCOUNTER
[facility] Cardiology Refill Guideline reviewed. Pt is due for follow up. Refill letter sent to pt. Prescription sent to pharmacy for 90 day supply and 0 refills.

## 2024-09-09 DIAGNOSIS — I95.9 HYPOTENSION, UNSPECIFIED HYPOTENSION TYPE: ICD-10-CM

## 2024-09-09 DIAGNOSIS — I46.9 CARDIAC ARREST (H): ICD-10-CM

## 2024-09-09 RX ORDER — ASPIRIN 81 MG/1
TABLET, CHEWABLE ORAL
Qty: 90 TABLET | Refills: 0 | Status: SHIPPED | OUTPATIENT
Start: 2024-09-09

## 2024-09-09 RX ORDER — MIDODRINE HYDROCHLORIDE 2.5 MG/1
TABLET ORAL
Qty: 270 TABLET | Refills: 0 | Status: SHIPPED | OUTPATIENT
Start: 2024-09-09

## 2024-09-09 NOTE — LETTER
September 9, 2024       TO: Scot Bishop  [address] [address]       Dear Scot Bishop,    You have requested a medication refill and our records indicate that you have not been seen by one of our providers for your annual office visit.  We will refill your medication for 3 months, which will allow you enough time to be seen by one of our providers.    Please call our clinic at [phone] to schedule your appointment as soon as possible.    Thank you,    [facility]

## 2024-09-09 NOTE — TELEPHONE ENCOUNTER
[facility] Cardiology Refill Guideline reviewed.  Pt is overdue for follow up. Refill letter sent to pt. Prescriptions for 90 day supply and 0 refills sent to pharmacy.

## 2024-10-24 ENCOUNTER — IMMUNIZATION (OUTPATIENT)
Dept: INTERNAL MEDICINE | Facility: CLINIC | Age: 44
End: 2024-10-24
Payer: COMMERCIAL

## 2024-10-24 PROCEDURE — G0008 ADMIN INFLUENZA VIRUS VAC: HCPCS

## 2024-10-24 PROCEDURE — 90480 ADMN SARSCOV2 VAC 1/ONLY CMP: CPT

## 2024-10-24 PROCEDURE — 90656 IIV3 VACC NO PRSV 0.5 ML IM: CPT

## 2024-10-24 PROCEDURE — 91320 SARSCV2 VAC 30MCG TRS-SUC IM: CPT

## 2024-11-13 NOTE — LETTER
Letter by Fouzia High CNP at      Author: Fouzia High CNP Service: -- Author Type: --    Filed:  Encounter Date: 2021 Status: (Other)         Patient: Scot Bishop   MR Number: [id number]   YOB: 1980   Date of Visit: 2021       [facility]ARE FOR SENIORS      NAME:  Scot Bishop             :  1980    MRN: [id number]    CODE STATUS: DNR    FACILITY: [facility] [[id number]]       CHIEF COMPLAIN/REASON FOR VISIT:  Chief Complaint   Patient presents with   ? Problem Visit     gtube removed       HISTORY OF PRESENT ILLNESS: Scot Bishop is a 40 y.o. male being seen per today*per nursing request. He was at ED last night until 1 am this am as he had pulled Gtube out and new tube placed. He recently readmitted from the hospital after an extensive 3-week stay, he had aggressive behaviors here on the TCU and was sent out and was followed by psychiatric care at [facility]. We have been monitoring his LOC and has been sleepy, has TBI due to hypoxia.   He is a TBI and seen in common area this a.m. nonverbal he has a history of aphasia very with  poor eye contact.  [place] [facility] [place] May 2020. At that juncture he was in cardiac cath lab and suffered a hypoxic brain injury, high temp and thrombotic event. He is seen in his room today. Non verbal and did not track with his eyes. He will make eye contact. H. He will need ongoing rehab services with PT/OT/ST. Has Gtube due to his dysphagia,  Unfortunealy due to Scot Hypoxic brain injury he will not be able to understand hygiene education and nursing will need to meet these needs for pt. Therapy continues to work with him, seen ambualting with SBA two.Staff to anticipate needs and provide good elizabeth care two times a day and prn.   continues to work with family towards DC to a group home after TCU stay as patient will be unable to care for self, remains dependant  on staff for all ADL and mobility as well as GTube for feedings,. Remains NPO as  if he gets aggressive he could aspirate. Scot is very awake today. Today with minimal eye contact, up in a broda chair for positioning and he will continue to need 24 hour nursing care. Nursing reports a bit more agitated or restless lately, pulled gtube out last night.      . No Known Allergies:     Current Outpatient Medications   Medication Sig   ? acetaminophen (TYLENOL) 650 mg/20.3 mL Soln 650 mg every 6 (six) hours as needed. Via PEG-tube    ? amantadine HCL (SYMMETREL) 50 mg/5 mL solution 100 mg every 12 (twelve) hours. Via PEG-tube   ? aspirin 81 MG EC tablet 81 mg daily. Via PEG-tube   ? atorvastatin (LIPITOR) 40 MG tablet 40 mg at bedtime. Via PEG-tube   ? haloperidoL (HALDOL) 5 MG tablet 5 mg by G-tube route 3 (three) times a day.   ? lisinopriL (PRINIVIL,ZESTRIL) 2.5 MG tablet 1.25 mg daily. Via PEG-tube, hold if SBP <90   ? LORazepam (ATIVAN) 0.5 MG tablet 1 tablet (0.5 mg total) by G-tube route 4 (four) times a day for 3 days.   ? metoprolol tartrate (LOPRESSOR) 25 MG tablet 6.25 mg 2 (two) times a day. Via PEG-tube, hold if SBP <90, HR <60   ? neomycin-bacitracin-polymyxin B (NEOSPORIN) 3.5-400-5,000 mg-unit-unit OiPk ointment Apply 1 application topically 2 (two) times a day.   ? omeprazole (PRILOSEC) 2 mg/mL SusR suspension 40 mg daily before breakfast. Via PEG-tube   ? PARoxetine (PAXIL) 20 MG tablet 20 mg by G-tube route every morning.   ? QUEtiapine (SEROQUEL) 25 MG tablet 50 mg 3 (three) times a day. Via PEG-tube give 25 mg am/pm with 12.5 mg mid day due to increased agitation   ? ticagrelor (BRILINTA) 90 mg Tab 90 mg 2 (two) times a day. Via PEG-tube   ? traZODone (DESYREL) 50 MG tablet 50 mg at bedtime. Via PEG-tube   ? valproate (DEPAKENE) 250 mg/5 mL 250 mg by G-tube route 2 (two) times a day.   ? white petrolatum (AQUAPHOR ORIGINAL) 41 % Oint Apply 1 application topically 2 (two) times a day. Apply to  feet/legs         REVIEW OF SYSTEMS:  Unable to verbalize due to aphasia    PHYSICAL EXAMINATION:  Vitals:    01/13/21 1652   BP: 98/80   Pulse: (!) 102   Temp: 98.4  F (36.9  C)   Weight: 152 lb 3.2 oz (69 kg)         GENERAL: Does not follow simple commands, makes eye contact, non verbal.  HEENT: Head is normocephalic with normal hair distribution. No evidence of trauma.Oral: reddened wth white coating on tongue left inner cheek Ears: No acute purulent discharge. Eyes: Conjunctivae pink with no scleral jaundice. Nose: Normal mucosa and septum. NECK: Supple with no cervical or supraclavicular lymphadenopathy. Trachea is midline, healing trach stomaLungs : He is CTA  EXTREMITIES: Atraumatic. Full range of motion on both upper and lower extremities, there is no tenderness to palpation, no pedal edema, no cyanosis or clubbing, no calf tenderness, normal cap refill, no joint swelling.  SKIN: Warm and dry, no erythema noted, abdomen with g tube  NEUROLOGICAL: The patient is oriented to person, TBI    Social Distancing with PPE practiced due to Covid 19    LABS:    Lab Results   Component Value Date    WBC 10.6 12/08/2020    HGB 13.7 (L) 12/08/2020    HCT 43.8 12/08/2020    MCV 94 12/08/2020     12/08/2020       Results for orders placed or performed in visit on 12/24/20   Basic Metabolic Panel   Result Value Ref Range    Sodium 138 136 - 145 mmol/L    Potassium 4.0 3.5 - 5.0 mmol/L    Chloride 102 98 - 107 mmol/L    CO2 23 22 - 31 mmol/L    Anion Gap, Calculation 13 5 - 18 mmol/L    Glucose 126 (H) 70 - 125 mg/dL    Calcium 9.6 8.5 - 10.5 mg/dL    BUN 17 8 - 22 mg/dL    Creatinine 0.75 0.70 - 1.30 mg/dL    GFR MDRD Af Amer >60 >60 mL/min/1.73m2    GFR MDRD Non Af Amer >60 >60 mL/min/1.73m2           No results found for: HGBA1C  No results found for: PVDVWMZC15OM  No results found for: IFERKVIV56    ASSESSMENT/PLAN:  1. Gastrojejunostomy tube status (H)    2. Hypoxic brain damage (H)      1. Gtube status: Was  replaced in ER. I recommend a belly binder for support of GTube.He is dependant on Gtube for his nutrition.    2. Hypoxic Brain injury: Scot is in a semi vegative state, med changes made in acute care and  is less aggressive now. Total dependance on staff for all ADL and med/tube management.  Pallative consult with [facility] in place. Rounding MD did decrease his Depakote but remains calm and minimally responsive with episodes of agitation . Further dose reductions not recommended at this time due to adverse reactions and behaviors he has ad in the past       Electronically signed by:  Fouzia High CNP  This progress note was completed using Dragon software and there may be grammatical errors.                    No

## 2024-12-02 ENCOUNTER — OFFICE VISIT (OUTPATIENT)
Dept: CARDIOLOGY | Facility: CLINIC | Age: 44
End: 2024-12-02
Payer: COMMERCIAL

## 2024-12-02 VITALS
DIASTOLIC BLOOD PRESSURE: 62 MMHG | HEIGHT: 68 IN | WEIGHT: 133 LBS | HEART RATE: 76 BPM | SYSTOLIC BLOOD PRESSURE: 88 MMHG | BODY MASS INDEX: 20.16 KG/M2 | OXYGEN SATURATION: 94 %

## 2024-12-02 DIAGNOSIS — I25.5 ISCHEMIC CARDIOMYOPATHY: Primary | ICD-10-CM

## 2024-12-02 DIAGNOSIS — I95.9 HYPOTENSION, UNSPECIFIED HYPOTENSION TYPE: ICD-10-CM

## 2024-12-02 DIAGNOSIS — I25.10 CORONARY ARTERY DISEASE INVOLVING NATIVE CORONARY ARTERY OF NATIVE HEART WITHOUT ANGINA PECTORIS: ICD-10-CM

## 2024-12-02 DIAGNOSIS — E78.49 OTHER HYPERLIPIDEMIA: ICD-10-CM

## 2024-12-02 DIAGNOSIS — I46.9 CARDIAC ARREST (H): ICD-10-CM

## 2024-12-02 RX ORDER — ATORVASTATIN CALCIUM 40 MG/1
40 TABLET, FILM COATED ORAL DAILY
Qty: 90 TABLET | Refills: 3 | Status: SHIPPED | OUTPATIENT
Start: 2024-12-02

## 2024-12-02 NOTE — LETTER
12/2/2024    Cachorro Morales MD  [address] [facility] [address]    RE: Scot Bishop       Dear Colleague,     I had the pleasure of seeing Scot Bishop in the [facility].        Cardiology Clinic Follow up     Scot Bishop MRN# [id number]   YOB: 1980 Age: 44 year old     Primary cardiologist: Dr. Marcus     Reason for visit: Annual follow up    History of presenting illness:    Scot Bishop is a 44 year old male with unfortunate past medical history significant for hypoxic brain injury secondary to VF arrest, cardiac arrest status post aspiration thrombectomy and PCI with drug-eluting stents of the proximal and mid LAD and POBA of D1 5/2020, respiratory failure requiring trach placement now removed, peg tube now removed, now non verbal, and ischemic cardiomyopathy with an EF of 30 to 35% who is here today for annual follow-up.     He was hospitalized initially on 5/17/2020 with a cardiac arrest. He had developed chest pain the day before per the notes. He been using Drano on his pipes at home and did not initially seek medical attention for his symptoms as he read on the box that Drano could cause those symptoms. He subsequently had a syncopal episode after coming out of the shower and EMS was called. He was asystolic when they arrived. He was ultimately resuscitated with ROSC. He had recurrent arrest on arrival to the Cath Lab. Coronary angiogram showed a thrombotic occlusion of the proximal LAD. He underwent successful aspiration thrombectomy and PCI with drug-eluting stents of the proximal and mid LAD and POBA of ostial D1. He was promptly placed on V-A ECMO and hypothermia protocol. His ejection fraction has remained in the 35% range since that time. He required trach and PEG placement and was discharged on 6/25/2020. He was then hospitalized in November 2020 at [facility] with increased agitation. Medication adjustments were made. Trach and  PEG have since been removed. Resides at a group home.     Seen last in the cardiology office with ELÍAS Viera 5/2023.    Today, Scot seen in office with his parents who provide history and details. Scot is nonverbal and in a wheelchair. He is able to eat and drink with the help of the staff at his group home and able to swallow medications. His PEG tube has been removed.  His mom states no concerns with edema and breathing seems comfortable. He is no longer able to use a standing frame due to safety concerns so has had muscle atrophy.  His blood pressure typically runs on the low side. Mother feels he is getting adequate hydration. He slides down in his wheelchair often and unclear if just restless but his face looks calm. Mother confirms no med changes from list in Epic.  No ER or hospitalizations in past year.            Assessment and Plan:     ASSESSMENT:    CAD with resuscitated VF cardiac arrest s/p thrombectomy and PCI NAZIA to prox, mid LAD 5/2020  ICM, HFrEF LVEF 30-35%  Hypotension requiring midodrine 2.5mg three times a day for blood pressure support  Severe hypoxic brain injury, non verbal, wheelchair bound  DNR, in discussion with his supportive parents, an ICD was not pursued as did not align with goals of care      PLAN:     Unable to tolerate GDMT with hypotension, BP 88/62 on midodrine. No ER visits for hypotension.   Legs are warm and no signs of fluid overload on exam  Continue aspirin and atorvastatin  LDL at goal at 51  No routine Echocardiogram unless symptom changes   Follow up in 1 year        Laine Hammer, DNP, APRN, CNP  [facility]     Orders this Visit:  Orders Placed This Encounter   Procedures     Follow-Up with Cardiology     Orders Placed This Encounter   Medications     atorvastatin (LIPITOR) 40 MG tablet     Sig: Take 1 tablet (40 mg) by mouth daily.     Dispense:  90 tablet     Refill:  3     aspirin (ASPIRIN LOW DOSE) 81 MG chewable tablet     Sig:  "Take 1 tablet (81 mg) by mouth daily.     Dispense:  90 tablet     Refill:  3     midodrine (PROAMATINE) 2.5 MG tablet     Sig: Take 1 tablet (2.5 mg) by mouth 3 times daily (with meals).     Dispense:  270 tablet     Refill:  3     Medications Discontinued During This Encounter   Medication Reason     atorvastatin (LIPITOR) 40 MG tablet Reorder (No AVS)     aspirin (ASPIRIN LOW DOSE) 81 MG chewable tablet Reorder (No AVS)     midodrine (PROAMATINE) 2.5 MG tablet Reorder (No AVS)              Physical Exam:   Vitals: BP (!) 88/62   Pulse 76   Ht 1.727 m (5' 8\")   Wt 60.3 kg (133 lb)   SpO2 94%   BMI 20.22 kg/m      Body mass index is 20.22 kg/m .    Vitals:    12/02/24 1312   Weight: 60.3 kg (133 lb)     General :   Alert in no acute distress. Non verbal. In wheelchair   Respiratory:   Respirations unlabored. Clear bilaterally   Cardiovascular:   Rhythm is regular. S1 and S2 are normal.    Extremities: Warm and dry, No lower edema            Medications:     Current Outpatient Medications   Medication Sig Dispense Refill     acetaminophen (TYLENOL) 325 MG tablet Take 1 tablet (325 mg) by mouth every 6 hours as needed for mild pain or fever 90 tablet 0     aspirin (ASPIRIN LOW DOSE) 81 MG chewable tablet Take 1 tablet (81 mg) by mouth daily. 90 tablet 3     atorvastatin (LIPITOR) 40 MG tablet Take 1 tablet (40 mg) by mouth daily. 90 tablet 3     divalproex sodium delayed-release (DEPAKOTE) 125 MG DR tablet Take 1 tablet (125 mg) by mouth 2 times daily 60 tablet 11     docusate sodium (COLACE) 100 MG capsule Take 1 capsule (100 mg) by mouth 2 times daily as needed for constipation 180 capsule 0     LORazepam (ATIVAN) 0.5 MG tablet Take 0.5 tablets (0.25 mg) by mouth 2 times daily At 8 AM and 4 PM 30 tablet 5     midodrine (PROAMATINE) 2.5 MG tablet Take 1 tablet (2.5 mg) by mouth 3 times daily (with meals). 270 tablet 3     mineral oil-hydrophilic petrolatum (AQUAPHOR) external ointment APPLY TO FEET AND LEGS " TWICE DAILY AS NEEDED FOR DRYNESS 396 g 0     Neomycin-Bacitracin-Polymyxin (SM TRIPLE ANTIBIOTIC ORIGINAL) 3.5-400-5000 OINT APPLY TO AFFECTED AREA TWICE A DAY AS NEEDED 28.4 g 1     nystatin (MYCOSTATIN) 014488 UNIT/GM external cream APPLY TO BUTTOCKS TWICE DAILY AS NEEDED FOR FUNGAL RASH 15 g 1     PARoxetine (PAXIL) 30 MG tablet Take 1 tablet (30 mg) by mouth every morning 30 tablet 11     QUEtiapine (SEROQUEL) 25 MG tablet Take 0.5 tablets (12.5 mg) by mouth 2 times daily 8 AM and 4 PM 30 tablet 11       Family History   Problem Relation Age of Onset     Parkinsonism Father      Diabetes Maternal Aunt      Diabetes Type 1 Maternal Aunt      Other - See Comments Father         spinal stenosis      Lung Cancer Maternal Grandfather      Cancer Maternal Grandfather        Social History     Socioeconomic History     Marital status: Single     Spouse name: Not on file     Number of children: Not on file     Years of education: Not on file     Highest education level: Not on file   Occupational History     Not on file   Tobacco Use     Smoking status: Former     Current packs/day: 0.00     Types: Cigarettes     Start date: 1995     Quit date: 2020     Years since quittin.5     Smokeless tobacco: Never   Vaping Use     Vaping status: Never Used   Substance and Sexual Activity     Alcohol use: Not Currently     Drug use: Not Currently     Sexual activity: Not Currently   Other Topics Concern     Parent/sibling w/ CABG, MI or angioplasty before 65F 55M? Not Asked   Social History Narrative     Not on file     Social Drivers of Health     Financial Resource Strain: Low Risk  (1/3/2024)    Financial Resource Strain      Within the past 12 months, have you or your family members you live with been unable to get utilities (heat, electricity) when it was really needed?: No   Food Insecurity: Low Risk  (1/3/2024)    Food Insecurity      Within the past 12 months, did you worry that your food would run out before  you got money to buy more?: No      Within the past 12 months, did the food you bought just not last and you didn t have money to get more?: No   Transportation Needs: Low Risk  (1/3/2024)    Transportation Needs      Within the past 12 months, has lack of transportation kept you from medical appointments, getting your medicines, non-medical meetings or appointments, work, or from getting things that you need?: No   Physical Activity: Inactive (8/20/2021)    Exercise Vital Sign      Days of Exercise per Week: 0 days      Minutes of Exercise per Session: 0 min   Stress: Not on file   Social Connections: Unknown (1/1/2022)    [email or web address]    Social Connections      Frequency of Communication with Friends and Family: Not on file   Interpersonal Safety: Not on file   Housing Stability: Low Risk  (1/3/2024)    Housing Stability      Do you have housing? : Yes      Are you worried about losing your housing?: No            Past Medical History:     Past Medical History:   Diagnosis Date     Acute respiratory failure with hypoxia (H)      Aggression 11/09/2020     Agitation 09/21/2020     LOWELL (acute kidney injury) (H)      Back injury, sequela 10/27/2017     Cardiac arrest (H) 05/17/2020     Cardiac arrest (H)      Cognitive disorder 11/11/2020     Depressive disorder      Dysphagia 11/11/2020     Dysphagia      Gastrojejunostomy tube status (H) 11/11/2020     HTN (hypertension)      Hypoxic ischemic encephalopathy (H)      Low blood pressure 09/17/2020     Nonverbal 11/11/2020     NSTEMI (non-ST elevated myocardial infarction) (H)      TBI (traumatic brain injury) (H) 06/25/2020              Past Surgical History:     Past Surgical History:   Procedure Laterality Date     APPENDECTOMY       CARDIAC CATHETERIZATION  05/17/2020    Procedure: Coronary Angiogram; Surgeon: Chadd Newby MD; Location:  HEART CARDIAC CATH LAB        CV CORONARY ANGIOGRAM N/A 5/17/2020    Procedure: Coronary Angiogram;  Surgeon: Chadd Newby MD;  Location:  HEART CARDIAC CATH LAB     CV EXTRACORPERAL MEMBRANE OXYGENATION N/A 5/17/2020    Procedure: Extracorporeal Membrance Oxygenation;  Surgeon: Chadd Newby MD;  Location: [facility] CARDIAC CATH LAB     CV INTRA AORTIC BALLOON N/A 5/17/2020    Procedure: Intra-Aortic Balloon Pump Insertion;  Surgeon: Chadd Newby MD;  Location:  HEART CARDIAC CATH LAB     CV PCI STENT DRUG ELUTING N/A 5/17/2020    Procedure: Percutaneous Coronary Intervention Stent Drug Eluting;  Surgeon: Chadd Newby MD;  Location: [facility] CARDIAC CATH LAB     G TUBE REPLACEMENT  8/19/2020     G TUBE REPLACEMENT  3/10/2021     G TUBE REPLACEMENT  5/20/2021     IR GASTROSTOMY TUBE CHANGE  8/19/2020     IR GASTROSTOMY TUBE CHANGE  3/10/2021     IR GASTROSTOMY TUBE CHANGE  5/20/2021     IR GASTROSTOMY TUBE PERCUTANEOUS PLCMNT  6/9/2020     OTHER SURGICAL HISTORY  05/17/2020    CV PCI STENT DRUG ELUTINGProcedure: Percutaneous Coronary Intervention Stent Drug Eluting; Surgeon: Chadd Newby MD; Location:  HEART CARDIAC CATH LAB       OTHER SURGICAL HISTORY  05/17/2020    CV INTRA AORTIC BALLON PUMP INSERTION Procedure: Intra-Aortic Balloon Pump Insertion; Surgeon: Chadd Newby MD; Location:  HEART CARDIAC CATH LAB       OTHER SURGICAL HISTORY  05/19/2020    REMOVE EXTRACORPOREAL MEMBRANE OXYGENATOR Procedure: ECMO Decannulation at Bedside; Surgeon: Mariano Workman MD; Location:  OR       OTHER SURGICAL HISTORY  05/17/2020    CV EXTRACORPOREAL MEMBRANE OXGENATIONProcedure: Extracorporeal Membrance Oxygenation; Surgeon: Chadd Newby MD; Location:  HEART CARDIAC CATH LAB       PEG TUBE PLACEMENT  06/09/2020     REMOVE EXTRACORPORAL MEMBRANE OXYGENATOR ADULT (OUTSIDE OR) N/A 5/19/2020    Procedure: ECMO Decannulation at Bedside;  Surgeon: Mariano Workman MD;  Location:  OR      ZZC APPENDECTOMY      Description: Appendectomy;  Recorded: 08/25/2014;  Comments: age 11 or 12              Allergies:   Patient has no known allergies.       Data Reviewed today:   Echocardiogram    All laboratory data reviewed:    Recent Labs   Lab Test 01/03/24  1116 11/17/22  1352 01/17/22  1443 11/16/21  1409 05/21/20  0437 05/20/20  0356   LDL 51 40  --  27   < >  --    HDL 29* 32*  --  39*   < >  --    NHDL 93 78  --  61   < >  --    CHOL 122 110  --  100   < >  --    TRIG 210* 188*  --  170*   < >  --    TSH  --   --  0.52  --   --  1.01    < > = values in this interval not displayed.       Lab Results   Component Value Date    WBC 6.5 05/17/2023    WBC 7.7 04/26/2021    RBC 4.59 05/17/2023    RBC 4.17 (L) 04/26/2021    HGB 13.7 05/17/2023    HGB 13.1 (L) 04/26/2021    HCT 42.2 05/17/2023    HCT 41.3 04/26/2021    MCV 92 05/17/2023    MCV 99 04/26/2021    MCH 29.8 05/17/2023    MCH 31.4 04/26/2021    MCHC 32.5 05/17/2023    MCHC 31.7 04/26/2021    RDW 12.6 05/17/2023    RDW 12.7 04/26/2021     05/17/2023     04/26/2021       Lab Results   Component Value Date     01/03/2024     06/25/2020    POTASSIUM 5.0 01/03/2024    POTASSIUM 4.7 01/17/2022    POTASSIUM 3.8 06/25/2020    CHLORIDE 107 01/03/2024    CHLORIDE 104 01/17/2022    CHLORIDE 102 06/25/2020    CO2 27 01/03/2024    CO2 27 01/17/2022    CO2 24 06/25/2020    ANIONGAP 11 01/03/2024    ANIONGAP 4 01/17/2022    ANIONGAP 13 06/25/2020    GLC 88 01/03/2024    GLC 92 01/17/2022    GLC 98 06/25/2020    BUN 15.5 01/03/2024    BUN 17 01/17/2022    BUN 29 06/25/2020    CR 0.88 01/03/2024    CR 0.76 06/25/2020    GFRESTIMATED >90 01/03/2024    GFRESTIMATED >60 12/24/2020    GFRESTIMATED >90 06/25/2020    GFRESTBLACK >60 12/24/2020    GFRESTBLACK >90 06/25/2020    TEN 9.7 01/03/2024    TEN 9.7 06/25/2020      Lab Results   Component Value Date    AST 20 01/03/2024    AST 11 04/26/2021    ALT 36 01/03/2024    ALT 27 04/26/2021       Lab  Results   Component Value Date    A1C 5.2 05/25/2020       This note has been dictated using voice recognition software. Any grammatical, typographical, or context distortions are unintentional and inherent to the software.      Thank you for allowing me to participate in the care of your patient.      Sincerely,     TERRENCE Adams CNP     [facility]  cc:   TERRENCE Vargas CNP  [address]  [place] [address]

## 2024-12-02 NOTE — PATIENT INSTRUCTIONS
It was nice to meet you.    Thank you for your visit with the [facility] today.    Medication changes and/or recommendations discussed today:   Continue aspirin and atorvastatin   Ensure getting adequate liquid intake   Lower sodium foods are preferred     Labs:  - LDL cholesterol at goal. Triglycerides a little on the high side.   - Repeat cholesterol again in January     Follow up plan:   - 1 year; sooner if symptom changes develop like edema/swelling or concerns of difficulty breathing        If you have questions or concerns in the meantime please call my nurse team at [phone] or send a [employer] message for non urgent requests.       Scheduling phone number: [phone]    ________________________________    TERRENCE Acevedo, CNP    [facility]

## 2024-12-02 NOTE — PROGRESS NOTES
Cardiology Clinic Follow up     Scot Bishop MRN# [id number]   YOB: 1980 Age: 44 year old     Primary cardiologist: Dr. Marcus     Reason for visit: Annual follow up    History of presenting illness:    Scot Bishop is a 44 year old male with unfortunate past medical history significant for hypoxic brain injury secondary to VF arrest, cardiac arrest status post aspiration thrombectomy and PCI with drug-eluting stents of the proximal and mid LAD and POBA of D1 5/2020, respiratory failure requiring trach placement now removed, peg tube now removed, now non verbal, and ischemic cardiomyopathy with an EF of 30 to 35% who is here today for annual follow-up.     He was hospitalized initially on 5/17/2020 with a cardiac arrest. He had developed chest pain the day before per the notes. He been using Drano on his pipes at home and did not initially seek medical attention for his symptoms as he read on the box that Drano could cause those symptoms. He subsequently had a syncopal episode after coming out of the shower and EMS was called. He was asystolic when they arrived. He was ultimately resuscitated with ROSC. He had recurrent arrest on arrival to the Cath Lab. Coronary angiogram showed a thrombotic occlusion of the proximal LAD. He underwent successful aspiration thrombectomy and PCI with drug-eluting stents of the proximal and mid LAD and POBA of ostial D1. He was promptly placed on V-A ECMO and hypothermia protocol. His ejection fraction has remained in the 35% range since that time. He required trach and PEG placement and was discharged on 6/25/2020. He was then hospitalized in November 2020 at [facility] with increased agitation. Medication adjustments were made. Trach and PEG have since been removed. Resides at a group home.     Seen last in the cardiology office with ELÍAS Viera 5/2023.    Today, Scot seen in office with his parents who provide history and details. Scot is  nonverbal and in a wheelchair. He is able to eat and drink with the help of the staff at his group home and able to swallow medications. His PEG tube has been removed.  His mom states no concerns with edema and breathing seems comfortable. He is no longer able to use a standing frame due to safety concerns so has had muscle atrophy.  His blood pressure typically runs on the low side. Mother feels he is getting adequate hydration. He slides down in his wheelchair often and unclear if just restless but his face looks calm. Mother confirms no med changes from list in Epic.  No ER or hospitalizations in past year.            Assessment and Plan:     ASSESSMENT:    CAD with resuscitated VF cardiac arrest s/p thrombectomy and PCI NAZIA to prox, mid LAD 5/2020  ICM, HFrEF LVEF 30-35%  Hypotension requiring midodrine 2.5mg three times a day for blood pressure support  Severe hypoxic brain injury, non verbal, wheelchair bound  DNR, in discussion with his supportive parents, an ICD was not pursued as did not align with goals of care      PLAN:     Unable to tolerate GDMT with hypotension, BP 88/62 on midodrine. No ER visits for hypotension.   Legs are warm and no signs of fluid overload on exam  Continue aspirin and atorvastatin  LDL at goal at 51  No routine Echocardiogram unless symptom changes   Follow up in 1 year        Laine Hammer, DAVID, APRN, CNP  [facility]     Orders this Visit:  Orders Placed This Encounter   Procedures    Follow-Up with Cardiology     Orders Placed This Encounter   Medications    atorvastatin (LIPITOR) 40 MG tablet     Sig: Take 1 tablet (40 mg) by mouth daily.     Dispense:  90 tablet     Refill:  3    aspirin (ASPIRIN LOW DOSE) 81 MG chewable tablet     Sig: Take 1 tablet (81 mg) by mouth daily.     Dispense:  90 tablet     Refill:  3    midodrine (PROAMATINE) 2.5 MG tablet     Sig: Take 1 tablet (2.5 mg) by mouth 3 times daily (with meals).     Dispense:  270 tablet      "Refill:  3     Medications Discontinued During This Encounter   Medication Reason    atorvastatin (LIPITOR) 40 MG tablet Reorder (No AVS)    aspirin (ASPIRIN LOW DOSE) 81 MG chewable tablet Reorder (No AVS)    midodrine (PROAMATINE) 2.5 MG tablet Reorder (No AVS)              Physical Exam:   Vitals: BP (!) 88/62   Pulse 76   Ht 1.727 m (5' 8\")   Wt 60.3 kg (133 lb)   SpO2 94%   BMI 20.22 kg/m      Body mass index is 20.22 kg/m .    Vitals:    12/02/24 1312   Weight: 60.3 kg (133 lb)     General :   Alert in no acute distress. Non verbal. In wheelchair   Respiratory:   Respirations unlabored. Clear bilaterally   Cardiovascular:   Rhythm is regular. S1 and S2 are normal.    Extremities: Warm and dry, No lower edema            Medications:     Current Outpatient Medications   Medication Sig Dispense Refill    acetaminophen (TYLENOL) 325 MG tablet Take 1 tablet (325 mg) by mouth every 6 hours as needed for mild pain or fever 90 tablet 0    aspirin (ASPIRIN LOW DOSE) 81 MG chewable tablet Take 1 tablet (81 mg) by mouth daily. 90 tablet 3    atorvastatin (LIPITOR) 40 MG tablet Take 1 tablet (40 mg) by mouth daily. 90 tablet 3    divalproex sodium delayed-release (DEPAKOTE) 125 MG DR tablet Take 1 tablet (125 mg) by mouth 2 times daily 60 tablet 11    docusate sodium (COLACE) 100 MG capsule Take 1 capsule (100 mg) by mouth 2 times daily as needed for constipation 180 capsule 0    LORazepam (ATIVAN) 0.5 MG tablet Take 0.5 tablets (0.25 mg) by mouth 2 times daily At 8 AM and 4 PM 30 tablet 5    midodrine (PROAMATINE) 2.5 MG tablet Take 1 tablet (2.5 mg) by mouth 3 times daily (with meals). 270 tablet 3    mineral oil-hydrophilic petrolatum (AQUAPHOR) external ointment APPLY TO FEET AND LEGS TWICE DAILY AS NEEDED FOR DRYNESS 396 g 0    Neomycin-Bacitracin-Polymyxin (SM TRIPLE ANTIBIOTIC ORIGINAL) 3.5-400-5000 OINT APPLY TO AFFECTED AREA TWICE A DAY AS NEEDED 28.4 g 1    nystatin (MYCOSTATIN) 873347 UNIT/GM external " cream APPLY TO BUTTOCKS TWICE DAILY AS NEEDED FOR FUNGAL RASH 15 g 1    PARoxetine (PAXIL) 30 MG tablet Take 1 tablet (30 mg) by mouth every morning 30 tablet 11    QUEtiapine (SEROQUEL) 25 MG tablet Take 0.5 tablets (12.5 mg) by mouth 2 times daily 8 AM and 4 PM 30 tablet 11       Family History   Problem Relation Age of Onset    Parkinsonism Father     Diabetes Maternal Aunt     Diabetes Type 1 Maternal Aunt     Other - See Comments Father         spinal stenosis     Lung Cancer Maternal Grandfather     Cancer Maternal Grandfather        Social History     Socioeconomic History    Marital status: Single     Spouse name: Not on file    Number of children: Not on file    Years of education: Not on file    Highest education level: Not on file   Occupational History    Not on file   Tobacco Use    Smoking status: Former     Current packs/day: 0.00     Types: Cigarettes     Start date: 1995     Quit date: 2020     Years since quittin.5    Smokeless tobacco: Never   Vaping Use    Vaping status: Never Used   Substance and Sexual Activity    Alcohol use: Not Currently    Drug use: Not Currently    Sexual activity: Not Currently   Other Topics Concern    Parent/sibling w/ CABG, MI or angioplasty before 65F 55M? Not Asked   Social History Narrative    Not on file     Social Drivers of Health     Financial Resource Strain: Low Risk  (1/3/2024)    Financial Resource Strain     Within the past 12 months, have you or your family members you live with been unable to get utilities (heat, electricity) when it was really needed?: No   Food Insecurity: Low Risk  (1/3/2024)    Food Insecurity     Within the past 12 months, did you worry that your food would run out before you got money to buy more?: No     Within the past 12 months, did the food you bought just not last and you didn t have money to get more?: No   Transportation Needs: Low Risk  (1/3/2024)    Transportation Needs     Within the past 12 months, has lack  of transportation kept you from medical appointments, getting your medicines, non-medical meetings or appointments, work, or from getting things that you need?: No   Physical Activity: Inactive (8/20/2021)    Exercise Vital Sign     Days of Exercise per Week: 0 days     Minutes of Exercise per Session: 0 min   Stress: Not on file   Social Connections: Unknown (1/1/2022)    [facility], [employer] [facility]    Social Connections     Frequency of Communication with Friends and Family: Not on file   Interpersonal Safety: Not on file   Housing Stability: Low Risk  (1/3/2024)    Housing Stability     Do you have housing? : Yes     Are you worried about losing your housing?: No            Past Medical History:     Past Medical History:   Diagnosis Date    Acute respiratory failure with hypoxia (H)     Aggression 11/09/2020    Agitation 09/21/2020    LOWELL (acute kidney injury) (H)     Back injury, sequela 10/27/2017    Cardiac arrest (H) 05/17/2020    Cardiac arrest (H)     Cognitive disorder 11/11/2020    Depressive disorder     Dysphagia 11/11/2020    Dysphagia     Gastrojejunostomy tube status (H) 11/11/2020    HTN (hypertension)     Hypoxic ischemic encephalopathy (H)     Low blood pressure 09/17/2020    Nonverbal 11/11/2020    NSTEMI (non-ST elevated myocardial infarction) (H)     TBI (traumatic brain injury) (H) 06/25/2020              Past Surgical History:     Past Surgical History:   Procedure Laterality Date    APPENDECTOMY      CARDIAC CATHETERIZATION  05/17/2020    Procedure: Coronary Angiogram; Surgeon: Chadd Newby MD; Location: [facility] CARDIAC CATH LAB      CV CORONARY ANGIOGRAM N/A 5/17/2020    Procedure: Coronary Angiogram;  Surgeon: Chadd Newby MD;  Location: [facility] CARDIAC CATH LAB    CV EXTRACORPERAL MEMBRANE OXYGENATION N/A 5/17/2020    Procedure: Extracorporeal Membrance Oxygenation;  Surgeon: Chadd Newby  MD;  Location: [facility] CARDIAC CATH LAB    CV INTRA AORTIC BALLOON N/A 5/17/2020    Procedure: Intra-Aortic Balloon Pump Insertion;  Surgeon: Chadd Newby MD;  Location: [facility] CARDIAC CATH LAB    CV PCI STENT DRUG ELUTING N/A 5/17/2020    Procedure: Percutaneous Coronary Intervention Stent Drug Eluting;  Surgeon: Chadd Newby MD;  Location: [facility] CARDIAC CATH LAB    G TUBE REPLACEMENT  8/19/2020    G TUBE REPLACEMENT  3/10/2021    G TUBE REPLACEMENT  5/20/2021    IR GASTROSTOMY TUBE CHANGE  8/19/2020    IR GASTROSTOMY TUBE CHANGE  3/10/2021    IR GASTROSTOMY TUBE CHANGE  5/20/2021    IR GASTROSTOMY TUBE PERCUTANEOUS PLCMNT  6/9/2020    OTHER SURGICAL HISTORY  05/17/2020    CV PCI STENT DRUG ELUTINGProcedure: Percutaneous Coronary Intervention Stent Drug Eluting; Surgeon: Chadd Newby MD; Location: [facility] CARDIAC CATH LAB      OTHER SURGICAL HISTORY  05/17/2020    CV INTRA AORTIC BALLON PUMP INSERTION Procedure: Intra-Aortic Balloon Pump Insertion; Surgeon: Chadd Newby MD; Location: [facility] CARDIAC CATH LAB      OTHER SURGICAL HISTORY  05/19/2020    REMOVE EXTRACORPOREAL MEMBRANE OXYGENATOR Procedure: ECMO Decannulation at Bedside; Surgeon: Mariano Workman MD; Location:  OR      OTHER SURGICAL HISTORY  05/17/2020    CV EXTRACORPOREAL MEMBRANE OXGENATIONProcedure: Extracorporeal Membrance Oxygenation; Surgeon: Chadd Newby MD; Location: [facility] CARDIAC CATH LAB      PEG TUBE PLACEMENT  06/09/2020    REMOVE EXTRACORPORAL MEMBRANE OXYGENATOR ADULT (OUTSIDE OR) N/A 5/19/2020    Procedure: ECMO Decannulation at Bedside;  Surgeon: Mariano Workman MD;  Location:  OR    [facility] APPENDECTOMY      Description: Appendectomy;  Recorded: 08/25/2014;  Comments: age 11 or 12              Allergies:   Patient has no known allergies.       Data Reviewed today:   Echocardiogram    All laboratory data reviewed:    Recent Labs   Lab Test 01/03/24  1116 11/17/22  1352  01/17/22  1443 11/16/21  1409 05/21/20  0437 05/20/20  0356   LDL 51 40  --  27   < >  --    HDL 29* 32*  --  39*   < >  --    NHDL 93 78  --  61   < >  --    CHOL 122 110  --  100   < >  --    TRIG 210* 188*  --  170*   < >  --    TSH  --   --  0.52  --   --  1.01    < > = values in this interval not displayed.       Lab Results   Component Value Date    WBC 6.5 05/17/2023    WBC 7.7 04/26/2021    RBC 4.59 05/17/2023    RBC 4.17 (L) 04/26/2021    HGB 13.7 05/17/2023    HGB 13.1 (L) 04/26/2021    HCT 42.2 05/17/2023    HCT 41.3 04/26/2021    MCV 92 05/17/2023    MCV 99 04/26/2021    MCH 29.8 05/17/2023    MCH 31.4 04/26/2021    MCHC 32.5 05/17/2023    MCHC 31.7 04/26/2021    RDW 12.6 05/17/2023    RDW 12.7 04/26/2021     05/17/2023     04/26/2021       Lab Results   Component Value Date     01/03/2024     06/25/2020    POTASSIUM 5.0 01/03/2024    POTASSIUM 4.7 01/17/2022    POTASSIUM 3.8 06/25/2020    CHLORIDE 107 01/03/2024    CHLORIDE 104 01/17/2022    CHLORIDE 102 06/25/2020    CO2 27 01/03/2024    CO2 27 01/17/2022    CO2 24 06/25/2020    ANIONGAP 11 01/03/2024    ANIONGAP 4 01/17/2022    ANIONGAP 13 06/25/2020    GLC 88 01/03/2024    GLC 92 01/17/2022    GLC 98 06/25/2020    BUN 15.5 01/03/2024    BUN 17 01/17/2022    BUN 29 06/25/2020    CR 0.88 01/03/2024    CR 0.76 06/25/2020    GFRESTIMATED >90 01/03/2024    GFRESTIMATED >60 12/24/2020    GFRESTIMATED >90 06/25/2020    GFRESTBLACK >60 12/24/2020    GFRESTBLACK >90 06/25/2020    TEN 9.7 01/03/2024    TEN 9.7 06/25/2020      Lab Results   Component Value Date    AST 20 01/03/2024    AST 11 04/26/2021    ALT 36 01/03/2024    ALT 27 04/26/2021       Lab Results   Component Value Date    A1C 5.2 05/25/2020       This note has been dictated using voice recognition software. Any grammatical, typographical, or context distortions are unintentional and inherent to the software.

## 2024-12-03 RX ORDER — MIDODRINE HYDROCHLORIDE 2.5 MG/1
2.5 TABLET ORAL
Qty: 270 TABLET | Refills: 3 | Status: SHIPPED | OUTPATIENT
Start: 2024-12-03

## 2024-12-03 RX ORDER — ASPIRIN 81 MG/1
81 TABLET, CHEWABLE ORAL DAILY
Qty: 90 TABLET | Refills: 3 | Status: SHIPPED | OUTPATIENT
Start: 2024-12-03

## 2024-12-10 ENCOUNTER — MEDICAL CORRESPONDENCE (OUTPATIENT)
Dept: HEALTH INFORMATION MANAGEMENT | Facility: CLINIC | Age: 44
End: 2024-12-10
Payer: COMMERCIAL

## 2025-01-22 ENCOUNTER — ORDERS ONLY (AUTO-RELEASED) (OUTPATIENT)
Dept: INTERNAL MEDICINE | Facility: CLINIC | Age: 45
End: 2025-01-22

## 2025-01-22 ENCOUNTER — OFFICE VISIT (OUTPATIENT)
Dept: INTERNAL MEDICINE | Facility: CLINIC | Age: 45
End: 2025-01-22
Attending: INTERNAL MEDICINE
Payer: COMMERCIAL

## 2025-01-22 ENCOUNTER — TELEPHONE (OUTPATIENT)
Dept: INTERNAL MEDICINE | Facility: CLINIC | Age: 45
End: 2025-01-22

## 2025-01-22 VITALS
DIASTOLIC BLOOD PRESSURE: 62 MMHG | WEIGHT: 154.4 LBS | HEART RATE: 67 BPM | SYSTOLIC BLOOD PRESSURE: 91 MMHG | BODY MASS INDEX: 23.48 KG/M2

## 2025-01-22 DIAGNOSIS — Z12.11 SCREEN FOR COLON CANCER: ICD-10-CM

## 2025-01-22 DIAGNOSIS — S06.9X9S TRAUMATIC BRAIN INJURY WITH LOSS OF CONSCIOUSNESS, SEQUELA: ICD-10-CM

## 2025-01-22 DIAGNOSIS — I25.10 CORONARY ARTERY DISEASE INVOLVING NATIVE CORONARY ARTERY OF NATIVE HEART WITHOUT ANGINA PECTORIS: ICD-10-CM

## 2025-01-22 DIAGNOSIS — F34.81 DISRUPTIVE MOOD DYSREGULATION DISORDER: ICD-10-CM

## 2025-01-22 DIAGNOSIS — Z93.4 GASTROJEJUNOSTOMY TUBE STATUS (H): ICD-10-CM

## 2025-01-22 DIAGNOSIS — L98.9 SKIN DISORDER: ICD-10-CM

## 2025-01-22 DIAGNOSIS — E78.49 OTHER HYPERLIPIDEMIA: ICD-10-CM

## 2025-01-22 DIAGNOSIS — Z00.01 ENCOUNTER FOR ROUTINE ADULT MEDICAL EXAM WITH ABNORMAL FINDINGS: Primary | ICD-10-CM

## 2025-01-22 DIAGNOSIS — K59.00 CONSTIPATION, UNSPECIFIED CONSTIPATION TYPE: ICD-10-CM

## 2025-01-22 DIAGNOSIS — D69.6 THROMBOCYTOPENIA, UNSPECIFIED: ICD-10-CM

## 2025-01-22 LAB
ERYTHROCYTE [DISTWIDTH] IN BLOOD BY AUTOMATED COUNT: 11.5 % (ref 10–15)
HCT VFR BLD AUTO: 38.4 % (ref 40–53)
HGB BLD-MCNC: 13 G/DL (ref 13.3–17.7)
MCH RBC QN AUTO: 30.6 PG (ref 26.5–33)
MCHC RBC AUTO-ENTMCNC: 33.9 G/DL (ref 31.5–36.5)
MCV RBC AUTO: 90 FL (ref 78–100)
PLATELET # BLD AUTO: 198 10E3/UL (ref 150–450)
RBC # BLD AUTO: 4.25 10E6/UL (ref 4.4–5.9)
WBC # BLD AUTO: 7.8 10E3/UL (ref 4–11)

## 2025-01-22 PROCEDURE — G0439 PPPS, SUBSEQ VISIT: HCPCS | Performed by: INTERNAL MEDICINE

## 2025-01-22 PROCEDURE — 36415 COLL VENOUS BLD VENIPUNCTURE: CPT | Performed by: INTERNAL MEDICINE

## 2025-01-22 PROCEDURE — 99214 OFFICE O/P EST MOD 30 MIN: CPT | Mod: 25 | Performed by: INTERNAL MEDICINE

## 2025-01-22 PROCEDURE — 85027 COMPLETE CBC AUTOMATED: CPT | Performed by: INTERNAL MEDICINE

## 2025-01-22 PROCEDURE — 80061 LIPID PANEL: CPT | Performed by: INTERNAL MEDICINE

## 2025-01-22 PROCEDURE — 80053 COMPREHEN METABOLIC PANEL: CPT | Performed by: INTERNAL MEDICINE

## 2025-01-22 RX ORDER — MINERAL OIL/HYDROPHIL PETROLAT
OINTMENT (GRAM) TOPICAL PRN
Qty: 396 G | Refills: 0 | Status: SHIPPED | OUTPATIENT
Start: 2025-01-22 | End: 2025-01-22

## 2025-01-22 RX ORDER — LORAZEPAM 0.5 MG/1
0.25 TABLET ORAL
Qty: 30 TABLET | Refills: 0 | Status: CANCELLED | OUTPATIENT
Start: 2025-01-22

## 2025-01-22 RX ORDER — QUETIAPINE FUMARATE 25 MG/1
12.5 TABLET, FILM COATED ORAL 2 TIMES DAILY
Qty: 30 TABLET | Refills: 11 | Status: SHIPPED | OUTPATIENT
Start: 2025-01-22

## 2025-01-22 RX ORDER — DIVALPROEX SODIUM 125 MG/1
125 TABLET, DELAYED RELEASE ORAL 2 TIMES DAILY
Qty: 60 TABLET | Refills: 11 | Status: SHIPPED | OUTPATIENT
Start: 2025-01-22

## 2025-01-22 RX ORDER — MINERAL OIL/HYDROPHIL PETROLAT
OINTMENT (GRAM) TOPICAL PRN
Qty: 396 G | Refills: 0 | Status: SHIPPED | OUTPATIENT
Start: 2025-01-22

## 2025-01-22 RX ORDER — DOCUSATE SODIUM 100 MG/1
100 CAPSULE, LIQUID FILLED ORAL 2 TIMES DAILY PRN
Qty: 180 CAPSULE | Refills: 0 | Status: SHIPPED | OUTPATIENT
Start: 2025-01-22

## 2025-01-22 SDOH — HEALTH STABILITY: PHYSICAL HEALTH: ON AVERAGE, HOW MANY DAYS PER WEEK DO YOU ENGAGE IN MODERATE TO STRENUOUS EXERCISE (LIKE A BRISK WALK)?: 0 DAYS

## 2025-01-22 ASSESSMENT — SOCIAL DETERMINANTS OF HEALTH (SDOH): HOW OFTEN DO YOU GET TOGETHER WITH FRIENDS OR RELATIVES?: ONCE A WEEK

## 2025-01-22 NOTE — PROGRESS NOTES
Preventive Care Visit  [facility]  Cachorro Morales MD, Internal Medicine  Jan 22, 2025      Assessment & Plan     Traumatic brain injury with loss of consciousness, sequela  As baseline history and currently wheelchair-bound.    Encounter for routine adult medical exam with abnormal findings  Stable with routine lab work pending.  Suggest and recommend annual eye and dental exam.  Advised benefit of updating routine vaccines inclusive of tetanus booster.  - CBC with Platelets; Future  - Lipid panel reflex to direct LDL Non-fasting; Future  - Comprehensive metabolic panel; Future    Severe hypoxic-ischemic encephalopathy (H)  Noted as baseline without acute neurologic change    Thrombocytopenia, unspecified  Check CBC accordingly  - CBC with Platelets; Future    Other hyperlipidemia  Labs ordered as fasting per routine.    Coronary artery disease involving native coronary artery of native heart without angina pectoris  Continue with current medical management and statin therapy  - Lipid panel reflex to direct LDL Non-fasting; Future    Disruptive mood dysregulation disorder  Refilled per family requesting to assume psychiatric medication.  - divalproex sodium delayed-release (DEPAKOTE) 125 MG DR tablet; Take 1 tablet (125 mg) by mouth 2 times daily.  - QUEtiapine (SEROQUEL) 25 MG tablet; Take 0.5 tablets (12.5 mg) by mouth 2 times daily. 8 AM and 4 PM    Constipation, unspecified constipation type  Stable continue with daily use.  - docusate sodium (COLACE) 100 MG capsule; Take 1 capsule (100 mg) by mouth 2 times daily as needed for constipation.    Gastrojejunostomy tube status (H)  Resolved and removed now status post oral intake  - mineral oil-hydrophilic petrolatum (AQUAPHOR) external ointment; Apply topically as needed for other. Profile Rx: patient will contact pharmacy when needed    Screen for colon cancer  Discussed with family on suggest Cologuard assessment.  - COLOGUARD(EXACT  SCIENCES); Future    Patient has been advised of split billing requirements and indicates understanding: Yes        Counseling  Appropriate preventive services were addressed with this patient via screening, questionnaire, or discussion as appropriate for fall prevention, nutrition, physical activity, Tobacco-use cessation, social engagement, weight loss and cognition.  Checklist reviewing preventive services available has been given to the patient.  Reviewed patient's diet, addressing concerns and/or questions.   The patient was instructed to see the dentist every 6 months.   Updated plan of care.  Patient reported difficulty with activities of daily living were addressed today.    See Patient Instructions    Carlos Ellison is a 44 year old, presenting for the following:  Wellness Visit    HPI    PCP to take over prescribing psych medications.    No acute changes and is accompanied today by family    Health Care Directive  Patient has a Health Care Directive on file  Advance care planning document is on file and is current.      1/22/2025   General Health   How would you rate your overall physical health? (!) FAIR   Feel stress (tense, anxious, or unable to sleep) Not at all         1/22/2025   Nutrition   Diet: Regular (no restrictions)         1/22/2025   Exercise   Days per week of moderate/strenous exercise 0 days   (!) EXERCISE CONCERN      1/22/2025   Social Factors   Frequency of gathering with friends or relatives Once a week   Worry food won't last until get money to buy more No   Food not last or not have enough money for food? No   Do you have housing? (Housing is defined as stable permanent housing and does not include staying ouside in a car, in a tent, in an abandoned building, in an overnight shelter, or couch-surfing.) Yes   Are you worried about losing your housing? No   Lack of transportation? No   Unable to get utilities (heat,electricity)? No         1/22/2025   Fall Risk   Fallen 2 or more  times in the past year? No   Trouble with walking or balance? Yes   Reason Gait Speed Test Not Completed Patient does not tolerate an upright or standing position (e.g. wheelchair)          2025   Activities of Daily Living- Home Safety   Needs help with the following daily activites Telephone use    Transportation    Shopping    Preparing meals    Housework    Bathing    Laundry    Medication administration    Money management    Toileting    Feeding    Dressing   Safety concerns in the home None of the above       Multiple values from one day are sorted in reverse-chronological order         2025   Dental   Dentist two times every year? (!) NO         2025   Hearing Screening   Hearing concerns? None of the above         2025   Driving Risk Screening   Patient/family members have concerns about driving (!) DECLINE         2025   General Alertness/Fatigue Screening   Have you been more tired than usual lately? No         2025   Urinary Incontinence Screening   Bothered by leaking urine in past 6 months No         2025   TB Screening   Were you born outside of the [place]? No       Today's PHQ-9 Score:       2025    12:57 PM   PHQ-9 SCORE   PHQ-9 Total Score MyChart Incomplete         2025   Substance Use   Alcohol more than 3/day or more than 7/wk No   Do you have a current opioid prescription? No   How severe/bad is pain from 1 to 10? 1/10   Do you use any other substances recreationally? No     Social History     Tobacco Use    Smoking status: Former     Current packs/day: 0.00     Types: Cigarettes     Start date: 1995     Quit date: 2020     Years since quittin.6    Smokeless tobacco: Never   Vaping Use    Vaping status: Never Used   Substance Use Topics    Alcohol use: Not Currently    Drug use: Not Currently       ASCVD Risk   The ASCVD Risk score (Karen GUZMAN, et al., 2019) failed to calculate for the following reasons:    Risk score cannot be  calculated because patient has a medical history suggesting prior/existing ASCVD        1/22/2025   Contraception/Family Planning   Questions about contraception or family planning No       Reviewed and updated as needed this visit by Provider                    Lab work is in process  Current providers sharing in care for this patient include:  Patient Care Team:  Cachorro Morales MD as PCP - General (Internal Medicine)  Scot Matamoros MD as MD (Cardiology)  Cachorro Morales MD as Assigned PCP  EVELINA Myles CM as   José Miguel, group home  as   Melanie, financial worker as Financial Resource Worker  Elsa Rahman NP as Assigned Behavioral Health Provider  Candace Perez RN as Lead Care Coordinator  Emelina Gama PA-C as Physician Assistant (Cardiovascular Disease)  Laine Hammer APRN CNP as Nurse Practitioner (Cardiology)  Laine Hammer APRN CNP as Assigned Heart and Vascular Provider    The following health maintenance items are reviewed in Epic and correct as of today:  Health Maintenance   Topic Date Due    HF ACTION PLAN  Never done    COLORECTAL CANCER SCREENING  Never done    HEPATITIS B IMMUNIZATION (1 of 3 - 19+ 3-dose series) Never done    ANNUAL REVIEW OF HM ORDERS  11/17/2023    CBC  05/17/2024    BMP  07/03/2024    PHQ-9  07/03/2024    DTAP/TDAP/TD IMMUNIZATION (7 - Td or Tdap) 08/21/2024    ALT  01/03/2025    LIPID  01/03/2025    MEDICARE ANNUAL WELLNESS VISIT  01/03/2025    GLUCOSE  01/03/2027    ADVANCE CARE PLANNING  01/03/2029    ZOSTER IMMUNIZATION (1 of 2) 07/08/2029    RSV VACCINE (1 - 1-dose 75+ series) 07/08/2054    TSH W/FREE T4 REFLEX  Completed    HEPATITIS C SCREENING  Completed    HIV SCREENING  Completed    DEPRESSION ACTION PLAN  Completed    INFLUENZA VACCINE  Completed    Pneumococcal Vaccine: Pediatrics (0 to 5 Years) and At-Risk Patients (6 to 49 Years)  Completed    COVID-19 Vaccine  Completed     "HPV IMMUNIZATION  Aged Out    MENINGITIS IMMUNIZATION  Aged Out    RSV MONOCLONAL ANTIBODY  Aged Out         Review of Systems  CONSTITUTIONAL: NEGATIVE for fever, chills, change in weight  EYES: NEGATIVE for vision changes or irritation  ENT/MOUTH: NEGATIVE for ear, mouth and throat problems  RESP: NEGATIVE for significant cough or SOB  CV: NEGATIVE for chest pain, palpitations or peripheral edema  GI: NEGATIVE for nausea, abdominal pain, heartburn, or change in bowel habits  : NEGATIVE for frequency, dysuria, or hematuria  MUSCULOSKELETAL: NEGATIVE for significant arthralgias or myalgia  NEURO: NEGATIVE for weakness, dizziness or paresthesias  ENDOCRINE: NEGATIVE for temperature intolerance, skin/hair changes  HEME: NEGATIVE for bleeding problems  PSYCHIATRIC: NEGATIVE for changes in mood or affect     Objective    Exam  BP 91/62   Pulse 67   Wt 70 kg (154 lb 6.4 oz)   BMI 23.48 kg/m     Estimated body mass index is 23.48 kg/m  as calculated from the following:    Height as of 12/2/24: 1.727 m (5' 8\").    Weight as of this encounter: 70 kg (154 lb 6.4 oz).    Physical Exam  GENERAL: alert nonverbal in response  EYES: Eyes grossly normal to inspection, PERRL and conjunctivae and sclerae normal  HENT: ear canals and TM's normal, nose and mouth without ulcers or lesions  RESP: lungs clear to auscultation - no rales, rhonchi or wheezes  CV: regular rate and rhythm, normal S1 S2, no S3 or S4, no murmur, click or rub, no peripheral edema  Abdomen is soft and nontender  NEURO: in wheelchair  PSYCH: non-verbal/ TBI         Signed Electronically by: Cachorro Morales MD    "

## 2025-01-22 NOTE — TELEPHONE ENCOUNTER
[facility] pharmacy is requesting specific directions for Aquaphor. They need to know where is been applied and how often.    Per previous refills -    APPLY TO FEET AND LEGS TWICE DAILY AS NEEDED     Pharmacy needs updated prescription.    Patient updated with results and instructions. No further questions at this time. Advised to let us know if symptoms persist.

## 2025-01-22 NOTE — TELEPHONE ENCOUNTER
Pharmacy called back. Updated scrip is still missing information.  They need to know how often pt needs to use Aquaphor. Pt lives at a group home and needs specific directions.Please advice.

## 2025-01-23 LAB
ALBUMIN SERPL BCG-MCNC: 4.4 G/DL (ref 3.5–5.2)
ALP SERPL-CCNC: 85 U/L (ref 40–150)
ALT SERPL W P-5'-P-CCNC: 34 U/L (ref 0–70)
ANION GAP SERPL CALCULATED.3IONS-SCNC: 9 MMOL/L (ref 7–15)
AST SERPL W P-5'-P-CCNC: 23 U/L (ref 0–45)
BILIRUB SERPL-MCNC: 0.5 MG/DL
BUN SERPL-MCNC: 14.2 MG/DL (ref 6–20)
CALCIUM SERPL-MCNC: 9.1 MG/DL (ref 8.8–10.4)
CHLORIDE SERPL-SCNC: 105 MMOL/L (ref 98–107)
CHOLEST SERPL-MCNC: 123 MG/DL
CREAT SERPL-MCNC: 0.84 MG/DL (ref 0.67–1.17)
EGFRCR SERPLBLD CKD-EPI 2021: >90 ML/MIN/1.73M2
FASTING STATUS PATIENT QL REPORTED: NO
FASTING STATUS PATIENT QL REPORTED: NO
GLUCOSE SERPL-MCNC: 80 MG/DL (ref 70–99)
HCO3 SERPL-SCNC: 28 MMOL/L (ref 22–29)
HDLC SERPL-MCNC: 32 MG/DL
LDLC SERPL CALC-MCNC: 54 MG/DL
NONHDLC SERPL-MCNC: 91 MG/DL
POTASSIUM SERPL-SCNC: 4.3 MMOL/L (ref 3.4–5.3)
PROT SERPL-MCNC: 7.7 G/DL (ref 6.4–8.3)
SODIUM SERPL-SCNC: 142 MMOL/L (ref 135–145)
TRIGL SERPL-MCNC: 184 MG/DL

## 2025-01-28 DIAGNOSIS — F34.81 DISRUPTIVE MOOD DYSREGULATION DISORDER: ICD-10-CM

## 2025-01-28 RX ORDER — LORAZEPAM 0.5 MG/1
0.25 TABLET ORAL 2 TIMES DAILY
Qty: 28 TABLET | Refills: 0 | Status: SHIPPED | OUTPATIENT
Start: 2025-01-31

## 2025-01-30 ENCOUNTER — MEDICAL CORRESPONDENCE (OUTPATIENT)
Dept: HEALTH INFORMATION MANAGEMENT | Facility: CLINIC | Age: 45
End: 2025-01-30
Payer: COMMERCIAL

## 2025-02-14 ENCOUNTER — TRANSFERRED RECORDS (OUTPATIENT)
Dept: HEALTH INFORMATION MANAGEMENT | Facility: CLINIC | Age: 45
End: 2025-02-14

## 2025-02-17 ENCOUNTER — DOCUMENTATION ONLY (OUTPATIENT)
Dept: INTERNAL MEDICINE | Facility: CLINIC | Age: 45
End: 2025-02-17
Payer: COMMERCIAL

## 2025-02-17 DIAGNOSIS — Z12.11 SCREEN FOR COLON CANCER: Primary | ICD-10-CM

## 2025-02-17 NOTE — PROGRESS NOTES
Eulalio caretaker came intoday requesting a FIT Kit instead of a cologuard.     His care taker mentioned he's unable to collect specimen (unable to use commode, she would like the FIT testing instead.    If so please order test.    Thank you!    Gloria GRANT

## 2025-02-18 ENCOUNTER — ORDERS ONLY (AUTO-RELEASED) (OUTPATIENT)
Dept: INTERNAL MEDICINE | Facility: CLINIC | Age: 45
End: 2025-02-18
Payer: COMMERCIAL

## 2025-02-18 DIAGNOSIS — Z12.11 SCREEN FOR COLON CANCER: ICD-10-CM

## 2025-02-20 DIAGNOSIS — F34.81 DISRUPTIVE MOOD DYSREGULATION DISORDER: ICD-10-CM

## 2025-02-20 RX ORDER — LORAZEPAM 0.5 MG/1
0.25 TABLET ORAL 2 TIMES DAILY
Qty: 28 TABLET | Refills: 0 | Status: SHIPPED | OUTPATIENT
Start: 2025-02-20

## 2025-03-12 ENCOUNTER — MEDICAL CORRESPONDENCE (OUTPATIENT)
Dept: HEALTH INFORMATION MANAGEMENT | Facility: CLINIC | Age: 45
End: 2025-03-12
Payer: COMMERCIAL

## 2025-03-12 ENCOUNTER — TELEPHONE (OUTPATIENT)
Dept: INTERNAL MEDICINE | Facility: CLINIC | Age: 45
End: 2025-03-12
Payer: COMMERCIAL

## 2025-03-12 NOTE — TELEPHONE ENCOUNTER
Medical supply company called and states they received back the form with a signature with the date being the date the letter of medical necessity was written they are going to refax this as they need the date to the be date it was signed for insurance to accept it

## 2025-03-17 NOTE — PROGRESS NOTES
Clinic Care Coordination Contact  [facility]/Voicemail    Clinical Data: Care Coordinator Outreach    Outreach Documentation Number of Outreach Attempt   2/13/2024  12:30 PM 2   3/27/2024  12:41 PM 3     Left message on patient's fatherGerald's  voicemail with call back information and requested return call.    Plan: Care Coordinator will send disenrollment letter with care coordinator contact information via [employer]. Care Coordinator will do no further outreaches at this time.     Candace Perez RN, BSN, PHN  Primary Care / Care Coordinator   [facility]  E-mail [email or web address]   [phone]       Detail Level: Detailed

## 2025-04-09 ENCOUNTER — TRANSFERRED RECORDS (OUTPATIENT)
Dept: HEALTH INFORMATION MANAGEMENT | Facility: CLINIC | Age: 45
End: 2025-04-09
Payer: COMMERCIAL

## 2025-04-22 DIAGNOSIS — F34.81 DISRUPTIVE MOOD DYSREGULATION DISORDER: ICD-10-CM

## 2025-04-22 RX ORDER — LORAZEPAM 0.5 MG/1
0.25 TABLET ORAL
Qty: 28 TABLET | Refills: 1 | Status: SHIPPED | OUTPATIENT
Start: 2025-04-22

## 2025-05-19 DIAGNOSIS — F34.81 DISRUPTIVE MOOD DYSREGULATION DISORDER: ICD-10-CM

## 2025-05-20 RX ORDER — LORAZEPAM 0.5 MG/1
0.25 TABLET ORAL
Qty: 28 TABLET | Refills: 0 | Status: SHIPPED | OUTPATIENT
Start: 2025-05-22

## 2025-06-12 ENCOUNTER — MEDICAL CORRESPONDENCE (OUTPATIENT)
Dept: HEALTH INFORMATION MANAGEMENT | Facility: CLINIC | Age: 45
End: 2025-06-12
Payer: COMMERCIAL

## 2025-06-17 DIAGNOSIS — F34.81 DISRUPTIVE MOOD DYSREGULATION DISORDER: ICD-10-CM

## 2025-06-17 RX ORDER — LORAZEPAM 0.5 MG/1
0.25 TABLET ORAL
Qty: 28 TABLET | Refills: 0 | Status: SHIPPED | OUTPATIENT
Start: 2025-06-17

## 2025-07-10 DIAGNOSIS — F34.81 DISRUPTIVE MOOD DYSREGULATION DISORDER: ICD-10-CM

## 2025-07-10 RX ORDER — LORAZEPAM 0.5 MG/1
0.25 TABLET ORAL
Qty: 28 TABLET | Refills: 0 | Status: SHIPPED | OUTPATIENT
Start: 2025-07-14

## 2025-07-30 DIAGNOSIS — Z93.4 GASTROJEJUNOSTOMY TUBE STATUS (H): ICD-10-CM

## 2025-07-30 DIAGNOSIS — L98.9 SKIN DISORDER: ICD-10-CM

## 2025-07-30 DIAGNOSIS — G93.1 ANOXIC BRAIN DAMAGE (H): ICD-10-CM

## 2025-07-30 NOTE — TELEPHONE ENCOUNTER
Unclear if the patient is still in need of these medications or are these being used on an as-needed basis.

## 2025-07-31 RX ORDER — BACITRACIN ZINC, NEOMYCIN SULFATE, AND POLYMYXIN B SULFATE 400; 3.5; 5 [IU]/G; MG/G; [IU]/G
OINTMENT TOPICAL
Qty: 28.4 G | Refills: 1 | Status: SHIPPED | OUTPATIENT
Start: 2025-07-31

## 2025-07-31 RX ORDER — NYSTATIN 100000 U/G
CREAM TOPICAL
Qty: 15 G | Refills: 1 | Status: SHIPPED | OUTPATIENT
Start: 2025-07-31

## 2025-07-31 NOTE — TELEPHONE ENCOUNTER
Called and spoke with José Miguel at pt's .     Both medications are currently used PRN.     Routing to PCP for review.   Thank you,  Yareli Anderson RN

## (undated) DEVICE — DRSG ADAPTIC 3X16"  6114

## (undated) DEVICE — SUTURE BOOTS 051003PBX

## (undated) DEVICE — INTRO SHEATH 6FRX25CM PINNACLE RSS606

## (undated) DEVICE — SU PROLENE 5-0 RB-2DA 30" 8710H

## (undated) DEVICE — SU VICRYL 0 CTX 36" J370H

## (undated) DEVICE — SU VICRYL 3-0 FS-1 27" J442H

## (undated) DEVICE — CANNULA VENOUS 25FR LONG

## (undated) DEVICE — CATH BALLOON EMERGE 3.0X20MM H7493918920300

## (undated) DEVICE — CATH BALLOON NC EMERGE 4.00X20MM H7493926720400

## (undated) DEVICE — ESU ELEC BLADE 2.75" COATED/INSULATED E1455

## (undated) DEVICE — INTRO SHTH INTRO SHTH 8FR X 11

## (undated) DEVICE — INTRO SHEATH 9FRX10CM PINNACLE RSS902

## (undated) DEVICE — SU SILK 0 TIE 6X30" A306H

## (undated) DEVICE — DRAPE IOBAN INCISE 23X17" 6650EZ

## (undated) DEVICE — 17FR 23CM ARTERIAL ECMO CANNULA WITH BIOLINE COATING

## (undated) DEVICE — GOWN IMPERVIOUS BREATHABLE SMART XLG 89045

## (undated) DEVICE — PACK HEART RIGHT CUSTOM SAN32RHF18

## (undated) DEVICE — SU PROLENE 4-0 RB-1DA 36" 8557H

## (undated) DEVICE — CATH GUIDING BLUE YELLOW PTFE XB3.5 6FRX100CM 67005400

## (undated) DEVICE — Device

## (undated) DEVICE — SU PROLENE 5-0 RB-1DA 36"  8556H

## (undated) DEVICE — CANISTER WOUND VAC W/GEL 1000ML M8275093/5

## (undated) DEVICE — LINEN GOWN XLG 5407

## (undated) DEVICE — ESU GROUND PAD ADULT W/CORD E7507

## (undated) DEVICE — GUIDEWIRE VASC 0.014INX180CM RUNTHROUGH 25-1011

## (undated) DEVICE — KIT HAND CONTROL ACIST 014644 AR-P54

## (undated) DEVICE — KIT ACCESSORY INTRO INFLATION SYS 20/30 PRIORITY 1000186-115

## (undated) DEVICE — VESSEL LOOPS YELLOW MAXI 31145694

## (undated) DEVICE — CANISTER ENGINE FOR INDIGO SYSTEM

## (undated) DEVICE — VALVE HEMOSTASIS .096" COPILOT MECH 1003331

## (undated) DEVICE — INTRO SHEATH MICRO PLATINUM TIP 4FRX40CM 7274

## (undated) DEVICE — KIT ARTERIAL LINE 2GA 12CM INTRO NDL ASK-04510-HF

## (undated) DEVICE — SU ETHILON 2-0 FS 18" 664H

## (undated) DEVICE — PREP POVIDONE IODINE SOLUTION 10% 4OZ

## (undated) DEVICE — SOL NACL 0.9% IRRIG 1000ML BOTTLE 2F7124

## (undated) DEVICE — LINEN TOWEL PACK X5 5464

## (undated) DEVICE — CATH BALLOON EMERGE 2.75X8MM H7493918908270

## (undated) DEVICE — KIT CATHETER INDIGO RX 140CM WITH LG LUMEN ASP TUBING

## (undated) DEVICE — SU PROLENE 4-0 SHDA 36" 8521H

## (undated) DEVICE — PREP POVIDONE IODINE SCRUB 7.5% 4OZ APL82212

## (undated) DEVICE — PREP SKIN SCRUB TRAY 4461A

## (undated) DEVICE — CATH BALLOON EMERGE 2.5X12MM H7493918912250

## (undated) DEVICE — ESU PENCIL W/COATED BLADE E2450H

## (undated) DEVICE — SOL ADH LIQUID BENZOIN SWAB 0.6ML C1544

## (undated) DEVICE — CATH ANGIO INFINITI 3DRC 6FRX100CM 534676T

## (undated) DEVICE — MANIFOLD KIT ANGIO AUTOMATED 014613

## (undated) DEVICE — CLIP HORIZON SM RED WIDE SLOT 001201

## (undated) DEVICE — CATH BALLOON NC EMERGE 3.50X20MM H7493926720350

## (undated) DEVICE — SYR 10ML LL W/O NDL 302995

## (undated) DEVICE — WIRE GUIDE 0.035"X145CM AMPLATZ XSTIFF J THSCF-35-145-3-AES

## (undated) DEVICE — CATH QUATTRO 9.3FR  FEM INSTRN

## (undated) DEVICE — CATH US OD 5FR OD SEC 2.9FR EAGLE EYE PLATINUM 0.014 85900P

## (undated) DEVICE — CLIP HORIZON MED BLUE 002200

## (undated) RX ORDER — LIDOCAINE HYDROCHLORIDE 10 MG/ML
INJECTION, SOLUTION EPIDURAL; INFILTRATION; INTRACAUDAL; PERINEURAL
Status: DISPENSED
Start: 2020-06-09

## (undated) RX ORDER — HEPARIN SODIUM 1000 [USP'U]/ML
INJECTION, SOLUTION INTRAVENOUS; SUBCUTANEOUS
Status: DISPENSED
Start: 2020-05-19

## (undated) RX ORDER — ALBUMIN, HUMAN INJ 5% 5 %
SOLUTION INTRAVENOUS
Status: DISPENSED
Start: 2020-05-17

## (undated) RX ORDER — FENTANYL CITRATE 50 UG/ML
INJECTION, SOLUTION INTRAMUSCULAR; INTRAVENOUS
Status: DISPENSED
Start: 2020-06-09

## (undated) RX ORDER — LIDOCAINE HYDROCHLORIDE 10 MG/ML
INJECTION, SOLUTION EPIDURAL; INFILTRATION; INTRACAUDAL; PERINEURAL
Status: DISPENSED
Start: 2020-05-20

## (undated) RX ORDER — ASPIRIN 81 MG/1
TABLET, CHEWABLE ORAL
Status: DISPENSED
Start: 2020-05-17

## (undated) RX ORDER — LIDOCAINE HYDROCHLORIDE 20 MG/ML
JELLY TOPICAL
Status: DISPENSED
Start: 2020-06-09

## (undated) RX ORDER — SODIUM NITROPRUSSIDE 25 MG/ML
INJECTION INTRAVENOUS
Status: DISPENSED
Start: 2020-05-17

## (undated) RX ORDER — LIDOCAINE HYDROCHLORIDE 20 MG/ML
JELLY TOPICAL
Status: DISPENSED
Start: 2023-11-21

## (undated) RX ORDER — LIDOCAINE HYDROCHLORIDE AND EPINEPHRINE 10; 10 MG/ML; UG/ML
INJECTION, SOLUTION INFILTRATION; PERINEURAL
Status: DISPENSED
Start: 2020-05-19

## (undated) RX ORDER — CEFAZOLIN SODIUM 1 G/3ML
INJECTION, POWDER, FOR SOLUTION INTRAMUSCULAR; INTRAVENOUS
Status: DISPENSED
Start: 2020-05-19

## (undated) RX ORDER — BUPIVACAINE HYDROCHLORIDE 5 MG/ML
INJECTION, SOLUTION PERINEURAL
Status: DISPENSED
Start: 2020-05-19

## (undated) RX ORDER — LIDOCAINE HYDROCHLORIDE 20 MG/ML
JELLY TOPICAL
Status: DISPENSED
Start: 2021-11-10